# Patient Record
Sex: FEMALE | Race: WHITE | NOT HISPANIC OR LATINO | Employment: FULL TIME | URBAN - METROPOLITAN AREA
[De-identification: names, ages, dates, MRNs, and addresses within clinical notes are randomized per-mention and may not be internally consistent; named-entity substitution may affect disease eponyms.]

---

## 2017-02-01 ENCOUNTER — OFFICE VISIT (OUTPATIENT)
Dept: PSYCHIATRY | Facility: CLINIC | Age: 52
End: 2017-02-01
Payer: COMMERCIAL

## 2017-02-01 VITALS
WEIGHT: 270 LBS | SYSTOLIC BLOOD PRESSURE: 139 MMHG | HEART RATE: 66 BPM | HEIGHT: 66 IN | DIASTOLIC BLOOD PRESSURE: 70 MMHG | BODY MASS INDEX: 43.39 KG/M2

## 2017-02-01 DIAGNOSIS — F11.21 OPIOID USE DISORDER, MODERATE, IN SUSTAINED REMISSION, DEPENDENCE: Primary | ICD-10-CM

## 2017-02-01 DIAGNOSIS — F32.A DEPRESSIVE DISORDER: ICD-10-CM

## 2017-02-01 PROCEDURE — 99999 PR PBB SHADOW E&M-EST. PATIENT-LVL II: CPT | Mod: PBBFAC,,, | Performed by: PSYCHIATRY & NEUROLOGY

## 2017-02-01 PROCEDURE — 99214 OFFICE O/P EST MOD 30 MIN: CPT | Mod: S$GLB,,, | Performed by: PSYCHIATRY & NEUROLOGY

## 2017-02-01 RX ORDER — DULOXETIN HYDROCHLORIDE 60 MG/1
120 CAPSULE, DELAYED RELEASE ORAL DAILY
Qty: 180 CAPSULE | Refills: 2 | Status: SHIPPED | OUTPATIENT
Start: 2017-02-01 | End: 2017-07-26 | Stop reason: SDUPTHER

## 2017-02-01 RX ORDER — TRAZODONE HYDROCHLORIDE 50 MG/1
50 TABLET ORAL NIGHTLY PRN
Qty: 90 TABLET | Refills: 2 | Status: SHIPPED | OUTPATIENT
Start: 2017-02-01 | End: 2017-07-26 | Stop reason: SDUPTHER

## 2017-02-01 NOTE — PROGRESS NOTES
Outpatient Psychiatry Follow-Up Visit (MD/NP)    2/1/2017    Clinical Status of Patient:  Outpatient (Ambulatory)    Chief Complaint:  Vicky Alvarado is a 51 y.o. OIBW   female ( dark neck length hair)  who presents today for follow-up of depression and substance problems.  Met with patient.   PCP - Dr Cordero  Affiliated [facility]     Interval History and Content of Current Session:  Interim Events/Subjective Report/Content of Current Session: Pt is s/p ABU in June - July 2015  . Sobriety is intact . Fa attends AA with pt as he  was  a chronic drinker . He later stopped for 10 yrs then dabbled some when available . Pt has  past hx of opiates, soma and Xanax but relapsed on opiates 3 yrs ago prior to rehab . Compliant with  RNP.   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  Enjoys her job . Alcohol was never a sig concern . Has regained ICU privileges , OT, 18 tests /yr  and now  narcs  .     Very proud of her continued  weight loss       [place] [address]         Psychiatric Review Of Systems - Is patient experiencing or having changes in:  sleep:  No , trouble with global insomnia resolved with trazodone   appetite: no, conscious  lifestyle change   weight: no, weigh  270   ; down 6  #  From June 2016 --now working out in bike at home and here in [place]   -[facility]  energy/anergy: no  interest/pleasure/anhedonia: no, she has adopted 2 kittens ( 8 months)   somatic symptoms: no, Cymbalta helps chronic knee pain   libido: no  anxiety/panic: no  guilty/hopelessness: no  concentration: no  S.I.B.s/risky behavior: no  Irritability: no  Racing thoughts: no  Impulsive behaviors: no  Paranoia:no  AVH:no    Cymbalta 60 mg 2 qday  ( $ 50 in 2016 ) ; trazodone 50 mg     Psychotherapy:  · Target symptoms: depression, substance abuse  · Why chosen therapy is appropriate versus another modality: relevant to diagnosis,  patient responds to this modality, evidence based practice  · Outcome monitoring methods: self-report, observation  · Therapeutic intervention type: supportive psychotherapy  · Topics discussed/themes: work stress, substance abuse  · The patient's response to the intervention is accepting. The patient's progress toward treatment goals is excellent.   · Duration of intervention: 15  minutes.    Review of Systems   · Prob Pert. 1 sys, Ext. psych +2 add., Comp. 10-14 sys  · PSYCHIATRIC: Pertinant items are noted in the narrative.  · CONSTITUTIONAL: No weight gain or loss.   · MUSCULOSKELETAL:  tolerable knee pain and  stiffness   · NEUROLOGIC: No weakness, sensory changes, seizures, confusion, memory loss, tremor or other abnormal movements.  · ENDOCRINE: No polydipsia or polyuria.  · INTEGUMENTARY: No rashes or lacerations.  · EYES: No exophthalmos, jaundice or blindness.  · ENT: No dizziness, tinnitus or hearing loss.  · RESPIRATORY: No shortness of breath.  · CARDIOVASCULAR: No tachycardia or chest pain.  · GASTROINTESTINAL: No nausea, vomiting, pain, constipation or diarrhea.  · GENITOURINARY: No frequency, dysuria or sexual dysfunction.  · HEMATOLOGIC/LYMPHATIC: No excessive bleeding, prolonged or excessive bleeding after dental extraction/injury.  · ALLERGIC/IMMUNOLOGIC: No allergic response to materials, foods or animals at this time.    Past Medical, Family and Social History: The patient's past medical, family and social history have been reviewed and updated as appropriate within the electronic medical record - see encounter notes.    Compliance: yes    Side effects: None    Risk Parameters:  Patient reports no suicidal ideation  Patient reports no homicidal ideation  Patient reports no self-injurious behavior  Patient reports no violent behavior    Exam (detailed: at least 9 elements; comprehensive: all 15 elements)   Constitutional  Vitals:  Most recent vital signs, dated less than 90 days prior to this  "appointment, were reviewed.   Vitals:    02/01/17 1549   BP: 139/70   Pulse: 66   Weight: 122.5 kg (270 lb)   Height: 5' 6" (1.676 m)    height is 5' 6" (1.676 m) and weight is 122.5 kg (270 lb). Her blood pressure is 139/70 and her pulse is 66.          General:  unremarkable, age appropriate, casually dressed, overweight     Musculoskeletal  Muscle Strength/Tone:  no spasicity, no rigidity   Gait & Station:  non-ataxic     Psychiatric  Speech:  no latency; no press   Mood & Affect:  euthymic  congruent and appropriate   Thought Process:  normal and logical   Associations:  intact   Thought Content:  normal, no suicidality, no homicidality, delusions, or paranoia   Insight:  has awareness of illness   Judgement: behavior is adequate to circumstances   Orientation:  grossly intact   Memory: intact for content of interview   Language: grossly intact   Attention Span & Concentration:  able to focus   Fund of Knowledge:  intact and appropriate to age and level of education     Assessment and Diagnosis   Status/Progress: Based on the examination today, the patient's problem(s) is/are well controlled.  New problems have not been presented today.   Lack of compliance are not complicating management of the primary condition.  There are no active rule-out diagnoses for this patient at this time.     General Impression: doing well       ICD-10-CM ICD-9-CM   1. Opioid use disorder, moderate, in sustained remission, dependence F11.21 304.03   2. Depressive disorder F32.9 311       Intervention/Counseling/Treatment Plan   · Continue current meds   · Continue ind and group therapy        Care coordinated with Recovering nurse program report -   I now support q 6 month med checks          Continue Exercising  And wt loss       Return to Clinic: 6 months  "

## 2017-03-16 ENCOUNTER — PATIENT MESSAGE (OUTPATIENT)
Dept: PSYCHIATRY | Facility: CLINIC | Age: 52
End: 2017-03-16

## 2017-03-20 RX ORDER — NALTREXONE HYDROCHLORIDE 50 MG/1
TABLET, FILM COATED ORAL
Qty: 30 TABLET | Refills: 0 | Status: SHIPPED | OUTPATIENT
Start: 2017-03-20 | End: 2018-01-18 | Stop reason: ALTCHOICE

## 2017-07-26 ENCOUNTER — OFFICE VISIT (OUTPATIENT)
Dept: PSYCHIATRY | Facility: CLINIC | Age: 52
End: 2017-07-26
Payer: COMMERCIAL

## 2017-07-26 VITALS
HEART RATE: 66 BPM | DIASTOLIC BLOOD PRESSURE: 67 MMHG | SYSTOLIC BLOOD PRESSURE: 112 MMHG | WEIGHT: 270 LBS | HEIGHT: 66 IN | BODY MASS INDEX: 43.39 KG/M2

## 2017-07-26 DIAGNOSIS — F11.21 OPIOID USE DISORDER, MODERATE, IN SUSTAINED REMISSION, DEPENDENCE: Primary | ICD-10-CM

## 2017-07-26 DIAGNOSIS — F32.A DEPRESSIVE DISORDER: ICD-10-CM

## 2017-07-26 PROCEDURE — 99213 OFFICE O/P EST LOW 20 MIN: CPT | Mod: S$GLB,,, | Performed by: PSYCHIATRY & NEUROLOGY

## 2017-07-26 PROCEDURE — 99999 PR PBB SHADOW E&M-EST. PATIENT-LVL II: CPT | Mod: PBBFAC,,, | Performed by: PSYCHIATRY & NEUROLOGY

## 2017-07-26 RX ORDER — TRAZODONE HYDROCHLORIDE 100 MG/1
100 TABLET ORAL NIGHTLY PRN
Qty: 90 TABLET | Refills: 1 | Status: SHIPPED | OUTPATIENT
Start: 2017-07-26 | End: 2018-01-24 | Stop reason: SDUPTHER

## 2017-07-26 RX ORDER — DULOXETIN HYDROCHLORIDE 60 MG/1
120 CAPSULE, DELAYED RELEASE ORAL DAILY
Qty: 180 CAPSULE | Refills: 2 | Status: SHIPPED | OUTPATIENT
Start: 2017-07-26 | End: 2018-01-24 | Stop reason: SDUPTHER
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
## 2017-07-27 ENCOUNTER — CLINICAL SUPPORT (OUTPATIENT)
Dept: OCCUPATIONAL MEDICINE | Facility: CLINIC | Age: 52
End: 2017-07-27

## 2017-07-27 DIAGNOSIS — Z02.83 EMPLOYMENT-RELATED DRUG TESTING, ENCOUNTER FOR: Primary | ICD-10-CM

## 2017-08-09 ENCOUNTER — CLINICAL SUPPORT (OUTPATIENT)
Dept: OCCUPATIONAL MEDICINE | Facility: CLINIC | Age: 52
End: 2017-08-09
Payer: COMMERCIAL

## 2017-08-09 DIAGNOSIS — Z02.83 EMPLOYMENT-RELATED DRUG TESTING, ENCOUNTER FOR: Primary | ICD-10-CM

## 2017-09-25 ENCOUNTER — CLINICAL SUPPORT (OUTPATIENT)
Dept: OCCUPATIONAL MEDICINE | Facility: CLINIC | Age: 52
End: 2017-09-25

## 2017-09-25 DIAGNOSIS — Z02.83 EMPLOYMENT-RELATED DRUG TESTING, ENCOUNTER FOR: Primary | ICD-10-CM

## 2017-09-27 DIAGNOSIS — M25.531 BILATERAL WRIST PAIN: Primary | ICD-10-CM

## 2017-09-27 DIAGNOSIS — M25.532 BILATERAL WRIST PAIN: Primary | ICD-10-CM

## 2017-09-28 ENCOUNTER — HOSPITAL ENCOUNTER (OUTPATIENT)
Dept: RADIOLOGY | Facility: OTHER | Age: 52
Discharge: HOME | End: 2017-09-28
Attending: ORTHOPAEDIC SURGERY
Payer: COMMERCIAL

## 2017-09-28 ENCOUNTER — OFFICE VISIT (OUTPATIENT)
Dept: ORTHOPEDICS | Facility: CLINIC | Age: 52
End: 2017-09-28
Payer: COMMERCIAL

## 2017-09-28 VITALS
DIASTOLIC BLOOD PRESSURE: 75 MMHG | WEIGHT: 270 LBS | RESPIRATION RATE: 18 BRPM | HEART RATE: 69 BPM | HEIGHT: 66 IN | SYSTOLIC BLOOD PRESSURE: 124 MMHG | BODY MASS INDEX: 43.39 KG/M2

## 2017-09-28 DIAGNOSIS — G56.01 CARPAL TUNNEL SYNDROME OF RIGHT WRIST: Primary | ICD-10-CM

## 2017-09-28 DIAGNOSIS — M25.531 BILATERAL WRIST PAIN: ICD-10-CM

## 2017-09-28 DIAGNOSIS — M25.532 BILATERAL WRIST PAIN: ICD-10-CM

## 2017-09-28 PROCEDURE — 76942 ECHO GUIDE FOR BIOPSY: CPT | Mod: S$GLB,,, | Performed by: ORTHOPAEDIC SURGERY

## 2017-09-28 PROCEDURE — 73110 X-RAY EXAM OF WRIST: CPT | Mod: 50,TC

## 2017-09-28 PROCEDURE — 20526 THER INJECTION CARP TUNNEL: CPT | Mod: RT,S$GLB,, | Performed by: ORTHOPAEDIC SURGERY

## 2017-09-28 PROCEDURE — 99213 OFFICE O/P EST LOW 20 MIN: CPT | Mod: 25,S$GLB,, | Performed by: ORTHOPAEDIC SURGERY

## 2017-09-28 PROCEDURE — 99999 PR PBB SHADOW E&M-EST. PATIENT-LVL III: CPT | Mod: PBBFAC,,, | Performed by: ORTHOPAEDIC SURGERY

## 2017-09-28 PROCEDURE — 73110 X-RAY EXAM OF WRIST: CPT | Mod: 26,50,, | Performed by: RADIOLOGY

## 2017-09-28 RX ADMIN — DEXAMETHASONE SODIUM PHOSPHATE 4 MG: 4 INJECTION, SOLUTION INTRA-ARTICULAR; INTRALESIONAL; INTRAMUSCULAR; INTRAVENOUS; SOFT TISSUE at 12:09

## 2017-09-28 NOTE — PROCEDURES
Carpal Tunnel  Date/Time: 9/28/2017 12:01 PM  Performed by: AJIT OSULLIVAN  Authorized by: AJIT OSULLIVAN     Consent Done?:  Yes (Verbal)  Indications:  Pain  Timeout: Prior to procedure the correct patient, procedure, and site was verified      Location:  Wrist  Site:  R radiocarpal  Ultrasonic Guidance for needle placement: Yes  Images are saved and documented.  Needle size:  25 G  Approach:  Volar  Medications:  4 mg dexamethasone 4 mg/mL

## 2017-09-28 NOTE — PROGRESS NOTES
I have personally taken the history and examined this patient. I agree with the resident's note as stated above. Plan for EMG/ NCS. Will proceed with injection today. Pt does get intermittant N/T but also has pain. Pt also has kienbocks on left wrist.  See procedure note

## 2017-10-06 NOTE — PROGRESS NOTES
H&P  Orthopaedics    SUBJECTIVE:     History of Present Illness:  Patient is a 52 y.o. female RHD with right thumb/index/long finger numbness.  It has been present for ~1 year.  No injury/trauma.  Does endorse some pain, that does occur at night.  Not using night splints.  Has h/o left Kienbock's, but this is not painful.    Scheduled Meds:  Continuous Infusions:  PRN Meds:    Review of patient's allergies indicates:  No Known Allergies    Past Medical History:   Diagnosis Date    Depression     Hx of psychiatric care     Psychiatric problem      No past surgical history on file.  Family History   Problem Relation Age of Onset    Cataracts Mother     Hypertension Mother     Thyroid disease Mother     Diabetes Father     Hypertension Father     Hypertension Sister     Hypertension Brother     Amblyopia Neg Hx     Blindness Neg Hx     Cancer Neg Hx     Glaucoma Neg Hx     Macular degeneration Neg Hx     Retinal detachment Neg Hx     Strabismus Neg Hx     Stroke Neg Hx      Social History   Substance Use Topics    Smoking status: Former Smoker    Smokeless tobacco: Not on file    Alcohol use No        Review of Systems:  Patient denies constitutional symptoms, cardiac symptoms, respiratory symptoms, GI symptoms.  The remainder of the musculoskeletal ROS is included in the HPI.      OBJECTIVE:     Vital Signs (Most Recent)  Pulse: 69 (09/28/17 1131)  Resp: 18 (09/28/17 1131)  BP: 124/75 (09/28/17 1131)    Physical Exam:  Gen:  No acute distress  CV:  Peripherally well-perfused.  Pulses 2+ bilaterally.  Lungs:  Normal respiratory effort.  Abdomen:  Soft, non-tender, non-distended  Head/Neck:  Normocephalic.  Atraumatic. No TTP, AROM and PROM intact without pain  Neuro:  CN intact without deficit, SILT throughout B/L Upper & Lower Extremities      MSK:  RUE: no deformity or ttp; + Durkan and Tinnel's; SILT throughout; NVI; full ROM    LUE: minimal ttp to dorsal lunate; negative Durkan/Tinnels' NVI;  full ROM    Laboratory:  No results found for this or any previous visit (from the past 72 hour(s)).    Diagnostic Results:      ASSESSMENT/PLAN:     A/P: Vicky Alvarado is a 52 y.o. 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  Will give injection today and send for EMG. RTC to discuss EMG results      Dre Guzmán M.D. PGY3  Orthopedic Surgery Resident

## 2017-10-16 RX ORDER — DEXAMETHASONE SODIUM PHOSPHATE 4 MG/ML
4 INJECTION, SOLUTION INTRA-ARTICULAR; INTRALESIONAL; INTRAMUSCULAR; INTRAVENOUS; SOFT TISSUE
Status: DISCONTINUED | OUTPATIENT
Start: 2017-09-28 | End: 2017-10-16 | Stop reason: HOSPADM

## 2017-10-19 ENCOUNTER — CLINICAL SUPPORT (OUTPATIENT)
Dept: OCCUPATIONAL MEDICINE | Facility: CLINIC | Age: 52
End: 2017-10-19

## 2017-10-19 DIAGNOSIS — Z02.83 ENCOUNTER FOR DRUG SCREENING: Primary | ICD-10-CM

## 2017-10-19 PROCEDURE — 80305 DRUG TEST PRSMV DIR OPT OBS: CPT | Mod: S$GLB,,, | Performed by: INTERNAL MEDICINE

## 2017-10-23 ENCOUNTER — PROCEDURE VISIT (OUTPATIENT)
Dept: NEUROLOGY | Facility: CLINIC | Age: 52
End: 2017-10-23
Payer: COMMERCIAL

## 2017-10-23 DIAGNOSIS — G56.01 CARPAL TUNNEL SYNDROME OF RIGHT WRIST: ICD-10-CM

## 2017-10-23 PROCEDURE — 95910 NRV CNDJ TEST 7-8 STUDIES: CPT | Mod: S$GLB,,, | Performed by: PSYCHIATRY & NEUROLOGY

## 2017-10-23 PROCEDURE — 95886 MUSC TEST DONE W/N TEST COMP: CPT | Mod: RT,S$GLB,, | Performed by: PSYCHIATRY & NEUROLOGY

## 2017-11-28 ENCOUNTER — CLINICAL SUPPORT (OUTPATIENT)
Dept: OCCUPATIONAL MEDICINE | Facility: CLINIC | Age: 52
End: 2017-11-28

## 2017-11-28 DIAGNOSIS — Z02.83 ENCOUNTER FOR DRUG SCREENING: Primary | ICD-10-CM

## 2017-11-28 PROCEDURE — 80305 DRUG TEST PRSMV DIR OPT OBS: CPT | Mod: S$GLB,,, | Performed by: INTERNAL MEDICINE

## 2017-12-21 ENCOUNTER — CLINICAL SUPPORT (OUTPATIENT)
Dept: OCCUPATIONAL MEDICINE | Facility: CLINIC | Age: 52
End: 2017-12-21

## 2017-12-21 DIAGNOSIS — Z02.83 ENCOUNTER FOR DRUG SCREENING: Primary | ICD-10-CM

## 2017-12-21 PROCEDURE — 80305 DRUG TEST PRSMV DIR OPT OBS: CPT | Mod: S$GLB,,, | Performed by: FAMILY MEDICINE

## 2018-01-05 ENCOUNTER — PATIENT MESSAGE (OUTPATIENT)
Dept: PSYCHIATRY | Facility: CLINIC | Age: 53
End: 2018-01-05

## 2018-01-12 ENCOUNTER — CLINICAL SUPPORT (OUTPATIENT)
Dept: OCCUPATIONAL MEDICINE | Facility: CLINIC | Age: 53
End: 2018-01-12

## 2018-01-12 DIAGNOSIS — Z02.83 ENCOUNTER FOR DRUG SCREENING: Primary | ICD-10-CM

## 2018-01-18 ENCOUNTER — OFFICE VISIT (OUTPATIENT)
Dept: PSYCHIATRY | Facility: CLINIC | Age: 53
End: 2018-01-18
Payer: COMMERCIAL

## 2018-01-18 VITALS
WEIGHT: 285 LBS | HEIGHT: 66 IN | BODY MASS INDEX: 45.8 KG/M2 | DIASTOLIC BLOOD PRESSURE: 73 MMHG | HEART RATE: 80 BPM | SYSTOLIC BLOOD PRESSURE: 142 MMHG

## 2018-01-18 DIAGNOSIS — E66.01 MORBID OBESITY WITH BMI OF 45.0-49.9, ADULT: ICD-10-CM

## 2018-01-18 DIAGNOSIS — F11.21 OPIOID USE DISORDER, MODERATE, IN SUSTAINED REMISSION, DEPENDENCE: Primary | ICD-10-CM

## 2018-01-18 DIAGNOSIS — F32.A DEPRESSIVE DISORDER: ICD-10-CM

## 2018-01-18 PROCEDURE — 99999 PR PBB SHADOW E&M-EST. PATIENT-LVL II: CPT | Mod: PBBFAC,,, | Performed by: PSYCHIATRY & NEUROLOGY

## 2018-01-18 PROCEDURE — 99214 OFFICE O/P EST MOD 30 MIN: CPT | Mod: S$GLB,,, | Performed by: PSYCHIATRY & NEUROLOGY

## 2018-01-18 PROCEDURE — 90833 PSYTX W PT W E/M 30 MIN: CPT | Mod: S$GLB,,, | Performed by: PSYCHIATRY & NEUROLOGY
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
## 2018-01-22 ENCOUNTER — LAB VISIT (OUTPATIENT)
Dept: LAB | Facility: HOSPITAL | Age: 53
End: 2018-01-22
Attending: PSYCHIATRY & NEUROLOGY
Payer: COMMERCIAL

## 2018-01-22 ENCOUNTER — PATIENT MESSAGE (OUTPATIENT)
Dept: PSYCHIATRY | Facility: HOSPITAL | Age: 53
End: 2018-01-22

## 2018-01-22 DIAGNOSIS — E66.01 MORBID OBESITY WITH BMI OF 45.0-49.9, ADULT: ICD-10-CM

## 2018-01-22 LAB
ALBUMIN SERPL BCP-MCNC: 3.5 G/DL
ALP SERPL-CCNC: 97 U/L
ALT SERPL W/O P-5'-P-CCNC: 24 U/L
ANION GAP SERPL CALC-SCNC: 12 MMOL/L
AST SERPL-CCNC: 39 U/L
BASOPHILS # BLD AUTO: 0.09 K/UL
BASOPHILS NFR BLD: 1.1 %
BILIRUB SERPL-MCNC: 0.5 MG/DL
BUN SERPL-MCNC: 11 MG/DL
CALCIUM SERPL-MCNC: 9.3 MG/DL
CHLORIDE SERPL-SCNC: 104 MMOL/L
CHOLEST SERPL-MCNC: 200 MG/DL
CHOLEST/HDLC SERPL: 5 {RATIO}
CO2 SERPL-SCNC: 26 MMOL/L
CREAT SERPL-MCNC: 0.8 MG/DL
DIFFERENTIAL METHOD: ABNORMAL
EOSINOPHIL # BLD AUTO: 0.4 K/UL
EOSINOPHIL NFR BLD: 5.5 %
ERYTHROCYTE [DISTWIDTH] IN BLOOD BY AUTOMATED COUNT: 15.1 %
EST. GFR  (AFRICAN AMERICAN): >60 ML/MIN/1.73 M^2
EST. GFR  (NON AFRICAN AMERICAN): >60 ML/MIN/1.73 M^2
GLUCOSE SERPL-MCNC: 125 MG/DL
HCT VFR BLD AUTO: 40.3 %
HDLC SERPL-MCNC: 40 MG/DL
HDLC SERPL: 20 %
HGB BLD-MCNC: 13.2 G/DL
IMM GRANULOCYTES # BLD AUTO: 0.02 K/UL
IMM GRANULOCYTES NFR BLD AUTO: 0.3 %
LDLC SERPL CALC-MCNC: 141 MG/DL
LYMPHOCYTES # BLD AUTO: 2.1 K/UL
LYMPHOCYTES NFR BLD: 25.8 %
MCH RBC QN AUTO: 28.6 PG
MCHC RBC AUTO-ENTMCNC: 32.8 G/DL
MCV RBC AUTO: 87 FL
MONOCYTES # BLD AUTO: 0.8 K/UL
MONOCYTES NFR BLD: 10 %
NEUTROPHILS # BLD AUTO: 4.6 K/UL
NEUTROPHILS NFR BLD: 57.3 %
NONHDLC SERPL-MCNC: 160 MG/DL
NRBC BLD-RTO: 0 /100 WBC
PLATELET # BLD AUTO: 206 K/UL
PMV BLD AUTO: 11.9 FL
POTASSIUM SERPL-SCNC: 3.9 MMOL/L
PROT SERPL-MCNC: 7.2 G/DL
RBC # BLD AUTO: 4.62 M/UL
SODIUM SERPL-SCNC: 142 MMOL/L
TRIGL SERPL-MCNC: 95 MG/DL
TSH SERPL DL<=0.005 MIU/L-ACNC: 1.05 UIU/ML
WBC # BLD AUTO: 7.98 K/UL

## 2018-01-22 PROCEDURE — 36415 COLL VENOUS BLD VENIPUNCTURE: CPT

## 2018-01-22 PROCEDURE — 84443 ASSAY THYROID STIM HORMONE: CPT

## 2018-01-22 PROCEDURE — 80053 COMPREHEN METABOLIC PANEL: CPT

## 2018-01-22 PROCEDURE — 80061 LIPID PANEL: CPT

## 2018-01-22 PROCEDURE — 85025 COMPLETE CBC W/AUTO DIFF WBC: CPT

## 2018-01-23 ENCOUNTER — CLINICAL SUPPORT (OUTPATIENT)
Dept: OCCUPATIONAL MEDICINE | Facility: CLINIC | Age: 53
End: 2018-01-23

## 2018-01-23 DIAGNOSIS — Z02.83 ENCOUNTER FOR DRUG SCREENING: Primary | ICD-10-CM

## 2018-01-24 RX ORDER — TRAZODONE HYDROCHLORIDE 100 MG/1
100 TABLET ORAL NIGHTLY PRN
Qty: 90 TABLET | Refills: 1 | Status: SHIPPED | OUTPATIENT
Start: 2018-01-24 | End: 2018-06-11 | Stop reason: SDUPTHER

## 2018-01-24 RX ORDER — DULOXETIN HYDROCHLORIDE 60 MG/1
120 CAPSULE, DELAYED RELEASE ORAL DAILY
Qty: 180 CAPSULE | Refills: 2 | Status: SHIPPED | OUTPATIENT
Start: 2018-01-24 | End: 2018-06-11 | Stop reason: SDUPTHER

## 2018-02-14 ENCOUNTER — CLINICAL SUPPORT (OUTPATIENT)
Dept: OCCUPATIONAL MEDICINE | Facility: CLINIC | Age: 53
End: 2018-02-14

## 2018-02-14 DIAGNOSIS — Z02.83 ENCOUNTER FOR DRUG SCREENING: Primary | ICD-10-CM

## 2018-02-14 PROCEDURE — 80305 DRUG TEST PRSMV DIR OPT OBS: CPT | Mod: S$GLB,,, | Performed by: NURSE PRACTITIONER

## 2018-03-05 ENCOUNTER — CLINICAL SUPPORT (OUTPATIENT)
Dept: OCCUPATIONAL MEDICINE | Facility: CLINIC | Age: 53
End: 2018-03-05

## 2018-03-05 DIAGNOSIS — Z02.83 ENCOUNTER FOR DRUG SCREENING: Primary | ICD-10-CM

## 2018-03-05 PROCEDURE — 80305 DRUG TEST PRSMV DIR OPT OBS: CPT | Mod: S$GLB,,, | Performed by: FAMILY MEDICINE

## 2018-03-20 ENCOUNTER — CLINICAL SUPPORT (OUTPATIENT)
Dept: OCCUPATIONAL MEDICINE | Facility: CLINIC | Age: 53
End: 2018-03-20

## 2018-03-20 DIAGNOSIS — Z02.83 EMPLOYMENT-RELATED DRUG TESTING, ENCOUNTER FOR: Primary | ICD-10-CM

## 2018-03-20 PROCEDURE — 80305 DRUG TEST PRSMV DIR OPT OBS: CPT | Mod: S$GLB,,, | Performed by: FAMILY MEDICINE

## 2018-03-21 ENCOUNTER — PATIENT MESSAGE (OUTPATIENT)
Dept: PSYCHIATRY | Facility: CLINIC | Age: 53
End: 2018-03-21

## 2018-04-10 ENCOUNTER — CLINICAL SUPPORT (OUTPATIENT)
Dept: OCCUPATIONAL MEDICINE | Facility: CLINIC | Age: 53
End: 2018-04-10

## 2018-04-10 DIAGNOSIS — Z02.83 ENCOUNTER FOR DRUG SCREENING: Primary | ICD-10-CM

## 2018-04-10 PROCEDURE — 80305 DRUG TEST PRSMV DIR OPT OBS: CPT | Mod: S$GLB,,, | Performed by: FAMILY MEDICINE

## 2018-04-25 ENCOUNTER — PATIENT MESSAGE (OUTPATIENT)
Dept: PSYCHIATRY | Facility: CLINIC | Age: 53
End: 2018-04-25

## 2018-04-27 ENCOUNTER — CLINICAL SUPPORT (OUTPATIENT)
Dept: OCCUPATIONAL MEDICINE | Facility: CLINIC | Age: 53
End: 2018-04-27

## 2018-04-27 DIAGNOSIS — Z02.83 ENCOUNTER FOR DRUG SCREENING: Primary | ICD-10-CM

## 2018-04-27 PROCEDURE — 80305 DRUG TEST PRSMV DIR OPT OBS: CPT | Mod: S$GLB,,, | Performed by: FAMILY MEDICINE

## 2018-05-31 ENCOUNTER — CLINICAL SUPPORT (OUTPATIENT)
Dept: OCCUPATIONAL MEDICINE | Facility: CLINIC | Age: 53
End: 2018-05-31

## 2018-05-31 DIAGNOSIS — Z02.83 ENCOUNTER FOR DRUG SCREENING: Primary | ICD-10-CM

## 2018-05-31 PROCEDURE — 80305 DRUG TEST PRSMV DIR OPT OBS: CPT | Mod: S$GLB,,, | Performed by: NURSE PRACTITIONER

## 2018-06-07 ENCOUNTER — CLINICAL SUPPORT (OUTPATIENT)
Dept: OCCUPATIONAL MEDICINE | Facility: CLINIC | Age: 53
End: 2018-06-07

## 2018-06-07 DIAGNOSIS — Z02.83 ENCOUNTER FOR DRUG SCREENING: Primary | ICD-10-CM

## 2018-06-07 PROCEDURE — 80305 DRUG TEST PRSMV DIR OPT OBS: CPT | Mod: S$GLB,,, | Performed by: INTERNAL MEDICINE

## 2018-06-11 ENCOUNTER — OFFICE VISIT (OUTPATIENT)
Dept: PSYCHIATRY | Facility: CLINIC | Age: 53
End: 2018-06-11
Payer: COMMERCIAL

## 2018-06-11 VITALS
SYSTOLIC BLOOD PRESSURE: 176 MMHG | HEIGHT: 66 IN | DIASTOLIC BLOOD PRESSURE: 75 MMHG | BODY MASS INDEX: 45.53 KG/M2 | WEIGHT: 283.31 LBS | HEART RATE: 65 BPM

## 2018-06-11 DIAGNOSIS — F32.A DEPRESSIVE DISORDER: Primary | ICD-10-CM

## 2018-06-11 DIAGNOSIS — E66.01 MORBID OBESITY WITH BMI OF 45.0-49.9, ADULT: ICD-10-CM

## 2018-06-11 DIAGNOSIS — F11.21 OPIOID USE DISORDER, MODERATE, IN SUSTAINED REMISSION, DEPENDENCE: ICD-10-CM

## 2018-06-11 PROCEDURE — 99213 OFFICE O/P EST LOW 20 MIN: CPT | Mod: S$GLB,,, | Performed by: PSYCHIATRY & NEUROLOGY

## 2018-06-11 PROCEDURE — 3077F SYST BP >= 140 MM HG: CPT | Mod: CPTII,S$GLB,, | Performed by: PSYCHIATRY & NEUROLOGY

## 2018-06-11 PROCEDURE — 3078F DIAST BP <80 MM HG: CPT | Mod: CPTII,S$GLB,, | Performed by: PSYCHIATRY & NEUROLOGY

## 2018-06-11 PROCEDURE — 99999 PR PBB SHADOW E&M-EST. PATIENT-LVL II: CPT | Mod: PBBFAC,,, | Performed by: PSYCHIATRY & NEUROLOGY

## 2018-06-11 PROCEDURE — 3008F BODY MASS INDEX DOCD: CPT | Mod: CPTII,S$GLB,, | Performed by: PSYCHIATRY & NEUROLOGY

## 2018-06-11 RX ORDER — DULOXETIN HYDROCHLORIDE 60 MG/1
120 CAPSULE, DELAYED RELEASE ORAL DAILY
Qty: 180 CAPSULE | Refills: 3 | Status: SHIPPED | OUTPATIENT
Start: 2018-06-11 | End: 2019-06-24 | Stop reason: SDUPTHER

## 2018-06-11 RX ORDER — TRAZODONE HYDROCHLORIDE 100 MG/1
100 TABLET ORAL NIGHTLY PRN
Qty: 90 TABLET | Refills: 3 | Status: SHIPPED | OUTPATIENT
Start: 2018-06-11 | End: 2019-06-24 | Stop reason: SDUPTHER
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
## 2018-06-20 ENCOUNTER — CLINICAL SUPPORT (OUTPATIENT)
Dept: OCCUPATIONAL MEDICINE | Facility: CLINIC | Age: 53
End: 2018-06-20

## 2018-06-20 DIAGNOSIS — Z02.83 ENCOUNTER FOR DRUG SCREENING: Primary | ICD-10-CM

## 2018-06-20 PROCEDURE — 80305 DRUG TEST PRSMV DIR OPT OBS: CPT | Mod: S$GLB,,, | Performed by: INTERNAL MEDICINE

## 2018-07-10 ENCOUNTER — CLINICAL SUPPORT (OUTPATIENT)
Dept: OCCUPATIONAL MEDICINE | Facility: CLINIC | Age: 53
End: 2018-07-10

## 2018-07-10 DIAGNOSIS — Z02.83 ENCOUNTER FOR DRUG SCREENING: Primary | ICD-10-CM

## 2018-07-10 PROCEDURE — 80305 DRUG TEST PRSMV DIR OPT OBS: CPT | Mod: S$GLB,,, | Performed by: FAMILY MEDICINE

## 2018-08-01 ENCOUNTER — CLINICAL SUPPORT (OUTPATIENT)
Dept: OCCUPATIONAL MEDICINE | Facility: CLINIC | Age: 53
End: 2018-08-01

## 2018-08-01 DIAGNOSIS — Z02.83 ENCOUNTER FOR DRUG SCREENING: Primary | ICD-10-CM

## 2018-08-01 PROCEDURE — 80305 DRUG TEST PRSMV DIR OPT OBS: CPT | Mod: S$GLB,,, | Performed by: INTERNAL MEDICINE

## 2018-08-17 ENCOUNTER — CLINICAL SUPPORT (OUTPATIENT)
Dept: URGENT CARE | Facility: CLINIC | Age: 53
End: 2018-08-17

## 2018-08-17 DIAGNOSIS — Z02.83 ENCOUNTER FOR DRUG SCREENING: Primary | ICD-10-CM

## 2018-08-17 PROCEDURE — 80305 DRUG TEST PRSMV DIR OPT OBS: CPT | Mod: S$GLB,,, | Performed by: FAMILY MEDICINE

## 2018-09-13 ENCOUNTER — CLINICAL SUPPORT (OUTPATIENT)
Dept: URGENT CARE | Facility: CLINIC | Age: 53
End: 2018-09-13

## 2018-09-13 DIAGNOSIS — Z02.83 ENCOUNTER FOR DRUG SCREENING: Primary | ICD-10-CM

## 2018-09-13 PROCEDURE — 80305 DRUG TEST PRSMV DIR OPT OBS: CPT | Mod: S$GLB,,, | Performed by: INTERNAL MEDICINE

## 2018-09-24 ENCOUNTER — CLINICAL SUPPORT (OUTPATIENT)
Dept: URGENT CARE | Facility: CLINIC | Age: 53
End: 2018-09-24

## 2018-09-24 DIAGNOSIS — Z02.83 ENCOUNTER FOR DRUG SCREENING: Primary | ICD-10-CM

## 2018-09-24 PROCEDURE — 80305 DRUG TEST PRSMV DIR OPT OBS: CPT | Mod: S$GLB,,, | Performed by: NURSE PRACTITIONER

## 2018-10-02 ENCOUNTER — CLINICAL SUPPORT (OUTPATIENT)
Dept: URGENT CARE | Facility: CLINIC | Age: 53
End: 2018-10-02

## 2018-10-02 DIAGNOSIS — Z02.83 ENCOUNTER FOR DRUG SCREENING: Primary | ICD-10-CM

## 2018-10-02 PROCEDURE — 80305 DRUG TEST PRSMV DIR OPT OBS: CPT | Mod: S$GLB,,, | Performed by: EMERGENCY MEDICINE

## 2018-10-15 ENCOUNTER — CLINICAL SUPPORT (OUTPATIENT)
Dept: URGENT CARE | Facility: CLINIC | Age: 53
End: 2018-10-15

## 2018-10-15 DIAGNOSIS — Z02.83 ENCOUNTER FOR DRUG SCREENING: Primary | ICD-10-CM

## 2018-10-15 PROCEDURE — 80305 DRUG TEST PRSMV DIR OPT OBS: CPT | Mod: S$GLB,,, | Performed by: FAMILY MEDICINE

## 2018-10-22 ENCOUNTER — OFFICE VISIT (OUTPATIENT)
Dept: OPTOMETRY | Facility: CLINIC | Age: 53
End: 2018-10-22
Payer: COMMERCIAL

## 2018-10-22 DIAGNOSIS — H52.4 MYOPIA OF BOTH EYES WITH ASTIGMATISM AND PRESBYOPIA: Primary | ICD-10-CM

## 2018-10-22 DIAGNOSIS — H52.13 MYOPIA OF BOTH EYES WITH ASTIGMATISM AND PRESBYOPIA: Primary | ICD-10-CM

## 2018-10-22 DIAGNOSIS — H52.203 MYOPIA OF BOTH EYES WITH ASTIGMATISM AND PRESBYOPIA: Primary | ICD-10-CM

## 2018-10-22 PROCEDURE — 92004 COMPRE OPH EXAM NEW PT 1/>: CPT | Mod: S$GLB,,, | Performed by: OPTOMETRIST

## 2018-10-22 PROCEDURE — 99999 PR PBB SHADOW E&M-EST. PATIENT-LVL III: CPT | Mod: PBBFAC,,, | Performed by: OPTOMETRIST

## 2018-10-22 PROCEDURE — 92015 DETERMINE REFRACTIVE STATE: CPT | Mod: S$GLB,,, | Performed by: OPTOMETRIST

## 2018-10-22 NOTE — PROGRESS NOTES
HPI     [facility]  Patient returns for overdue check.   Pt complains of blur @ near with gls that are more than 4 yrs.   Intermittent tearing OU and burning.     Last edited by Jonathan Montalvo, OD on 10/22/2018  2:40 PM. (History)            Assessment /Plan     For exam results, see Encounter Report.    Myopia of both eyes with astigmatism and presbyopia  Eyeglass Final Rx     Eyeglass Final Rx       Sphere Cylinder Axis Dist VA Add    Right -8.25 +3.75 180 20/20 +2.25    Left -8.75 +3.50 010 20/20 +2.25    Type:  PAL    Expiration Date:  10/23/2019                  RTC 1 yr

## 2018-11-08 ENCOUNTER — CLINICAL SUPPORT (OUTPATIENT)
Dept: URGENT CARE | Facility: CLINIC | Age: 53
End: 2018-11-08

## 2018-11-08 DIAGNOSIS — Z02.83 ENCOUNTER FOR DRUG SCREENING: Primary | ICD-10-CM

## 2018-11-08 PROCEDURE — 80305 DRUG TEST PRSMV DIR OPT OBS: CPT | Mod: S$GLB,,, | Performed by: INTERNAL MEDICINE

## 2018-11-28 ENCOUNTER — CLINICAL SUPPORT (OUTPATIENT)
Dept: URGENT CARE | Facility: CLINIC | Age: 53
End: 2018-11-28

## 2018-11-28 DIAGNOSIS — Z02.83 EMPLOYMENT-RELATED DRUG TESTING, ENCOUNTER FOR: Primary | ICD-10-CM

## 2018-11-28 PROCEDURE — 80305 DRUG TEST PRSMV DIR OPT OBS: CPT | Mod: S$GLB,,, | Performed by: INTERNAL MEDICINE

## 2018-12-11 RX ORDER — HYDROCHLOROTHIAZIDE 50 MG/1
TABLET ORAL
Qty: 90 TABLET | Refills: 2 | Status: CANCELLED | OUTPATIENT
Start: 2018-12-11

## 2018-12-11 RX ORDER — ATENOLOL 50 MG/1
TABLET ORAL
Qty: 180 TABLET | Refills: 2 | Status: CANCELLED | OUTPATIENT
Start: 2018-12-11

## 2018-12-11 RX ORDER — IBUPROFEN 800 MG/1
TABLET ORAL
Qty: 180 TABLET | Refills: 2 | Status: CANCELLED | OUTPATIENT
Start: 2018-12-11

## 2018-12-18 ENCOUNTER — CLINICAL SUPPORT (OUTPATIENT)
Dept: URGENT CARE | Facility: CLINIC | Age: 53
End: 2018-12-18

## 2018-12-18 DIAGNOSIS — Z02.83 ENCOUNTER FOR DRUG SCREENING: Primary | ICD-10-CM

## 2018-12-18 PROCEDURE — 80305 DRUG TEST PRSMV DIR OPT OBS: CPT | Mod: S$GLB,,, | Performed by: FAMILY MEDICINE

## 2018-12-27 ENCOUNTER — CLINICAL SUPPORT (OUTPATIENT)
Dept: URGENT CARE | Facility: CLINIC | Age: 53
End: 2018-12-27

## 2018-12-27 DIAGNOSIS — Z02.83 EMPLOYMENT-RELATED DRUG TESTING, ENCOUNTER FOR: Primary | ICD-10-CM

## 2018-12-27 PROCEDURE — 80305 DRUG TEST PRSMV DIR OPT OBS: CPT | Mod: S$GLB,,, | Performed by: INTERNAL MEDICINE

## 2019-01-10 ENCOUNTER — CLINICAL SUPPORT (OUTPATIENT)
Dept: URGENT CARE | Facility: CLINIC | Age: 54
End: 2019-01-10

## 2019-01-10 DIAGNOSIS — Z02.83 EMPLOYMENT-RELATED DRUG TESTING, ENCOUNTER FOR: Primary | ICD-10-CM

## 2019-01-10 PROCEDURE — 80305 PR COLLECTION ONLY DRUG SCREEN: ICD-10-PCS | Mod: S$GLB,,, | Performed by: INTERNAL MEDICINE

## 2019-01-10 PROCEDURE — 80305 DRUG TEST PRSMV DIR OPT OBS: CPT | Mod: S$GLB,,, | Performed by: INTERNAL MEDICINE

## 2019-01-23 ENCOUNTER — OCCUPATIONAL HEALTH (OUTPATIENT)
Dept: URGENT CARE | Facility: CLINIC | Age: 54
End: 2019-01-23

## 2019-01-23 DIAGNOSIS — Z02.83 EMPLOYMENT-RELATED DRUG TESTING, ENCOUNTER FOR: Primary | ICD-10-CM

## 2019-01-23 PROCEDURE — 80305 PR COLLECTION ONLY DRUG SCREEN: ICD-10-PCS | Mod: S$GLB,,, | Performed by: INTERNAL MEDICINE

## 2019-01-23 PROCEDURE — 80305 DRUG TEST PRSMV DIR OPT OBS: CPT | Mod: S$GLB,,, | Performed by: INTERNAL MEDICINE

## 2019-02-01 ENCOUNTER — OCCUPATIONAL HEALTH (OUTPATIENT)
Dept: URGENT CARE | Facility: CLINIC | Age: 54
End: 2019-02-01

## 2019-02-01 DIAGNOSIS — Z02.83 EMPLOYMENT-RELATED DRUG TESTING, ENCOUNTER FOR: Primary | ICD-10-CM

## 2019-02-01 PROCEDURE — 80305 PR COLLECTION ONLY DRUG SCREEN: ICD-10-PCS | Mod: S$GLB,,, | Performed by: FAMILY MEDICINE

## 2019-02-01 PROCEDURE — 80305 DRUG TEST PRSMV DIR OPT OBS: CPT | Mod: S$GLB,,, | Performed by: FAMILY MEDICINE

## 2019-03-12 ENCOUNTER — OCCUPATIONAL HEALTH (OUTPATIENT)
Dept: URGENT CARE | Facility: CLINIC | Age: 54
End: 2019-03-12

## 2019-03-12 DIAGNOSIS — Z02.83 ENCOUNTER FOR DRUG SCREENING: Primary | ICD-10-CM

## 2019-03-12 PROCEDURE — 80305 PR COLLECTION ONLY DRUG SCREEN: ICD-10-PCS | Mod: S$GLB,,, | Performed by: INTERNAL MEDICINE

## 2019-03-12 PROCEDURE — 80305 DRUG TEST PRSMV DIR OPT OBS: CPT | Mod: S$GLB,,, | Performed by: INTERNAL MEDICINE

## 2019-03-12 NOTE — PROGRESS NOTES
Subjective:       Patient ID: Vicky Alvarado is a 53 y.o. female.    Vitals:  vitals were not taken for this visit.     Chief Complaint: Drug / Alcohol Assessment    Pt came in for drug screen      Drug / Alcohol Assessment       <OUCOOHADULT>    Objective:      Physical Exam    Assessment:       No diagnosis found.    Plan:         There are no diagnoses linked to this encounter.

## 2019-04-10 ENCOUNTER — OCCUPATIONAL HEALTH (OUTPATIENT)
Dept: URGENT CARE | Facility: CLINIC | Age: 54
End: 2019-04-10

## 2019-04-10 DIAGNOSIS — Z02.83 ENCOUNTER FOR DRUG SCREENING: Primary | ICD-10-CM

## 2019-04-10 PROCEDURE — 80305 PR COLLECTION ONLY DRUG SCREEN: ICD-10-PCS | Mod: S$GLB,,, | Performed by: FAMILY MEDICINE

## 2019-04-10 PROCEDURE — 80305 DRUG TEST PRSMV DIR OPT OBS: CPT | Mod: S$GLB,,, | Performed by: FAMILY MEDICINE

## 2019-04-30 ENCOUNTER — OCCUPATIONAL HEALTH (OUTPATIENT)
Dept: URGENT CARE | Facility: CLINIC | Age: 54
End: 2019-04-30

## 2019-04-30 DIAGNOSIS — Z02.83 ENCOUNTER FOR DRUG SCREENING: Primary | ICD-10-CM

## 2019-04-30 PROCEDURE — 80305 PR COLLECTION ONLY DRUG SCREEN: ICD-10-PCS | Mod: S$GLB,,, | Performed by: NURSE PRACTITIONER

## 2019-04-30 PROCEDURE — 80305 DRUG TEST PRSMV DIR OPT OBS: CPT | Mod: S$GLB,,, | Performed by: NURSE PRACTITIONER

## 2019-05-21 ENCOUNTER — OFFICE VISIT (OUTPATIENT)
Dept: URGENT CARE | Facility: CLINIC | Age: 54
End: 2019-05-21
Payer: COMMERCIAL

## 2019-05-21 VITALS
BODY MASS INDEX: 45.48 KG/M2 | RESPIRATION RATE: 19 BRPM | WEIGHT: 283 LBS | TEMPERATURE: 97 F | HEIGHT: 66 IN | OXYGEN SATURATION: 96 % | SYSTOLIC BLOOD PRESSURE: 157 MMHG | DIASTOLIC BLOOD PRESSURE: 74 MMHG | HEART RATE: 65 BPM

## 2019-05-21 DIAGNOSIS — K04.7 DENTAL ABSCESS: Primary | ICD-10-CM

## 2019-05-21 PROCEDURE — 99214 PR OFFICE/OUTPT VISIT, EST, LEVL IV, 30-39 MIN: ICD-10-PCS | Mod: 25,S$GLB,, | Performed by: NURSE PRACTITIONER

## 2019-05-21 PROCEDURE — 99214 OFFICE O/P EST MOD 30 MIN: CPT | Mod: 25,S$GLB,, | Performed by: NURSE PRACTITIONER

## 2019-05-21 PROCEDURE — 96372 PR INJECTION,THERAP/PROPH/DIAG2ST, IM OR SUBCUT: ICD-10-PCS | Mod: S$GLB,,, | Performed by: NURSE PRACTITIONER

## 2019-05-21 PROCEDURE — 96372 THER/PROPH/DIAG INJ SC/IM: CPT | Mod: S$GLB,,, | Performed by: NURSE PRACTITIONER

## 2019-05-21 PROCEDURE — 3008F PR BODY MASS INDEX (BMI) DOCUMENTED: ICD-10-PCS | Mod: CPTII,S$GLB,, | Performed by: NURSE PRACTITIONER

## 2019-05-21 PROCEDURE — 3078F PR MOST RECENT DIASTOLIC BLOOD PRESSURE < 80 MM HG: ICD-10-PCS | Mod: CPTII,S$GLB,, | Performed by: NURSE PRACTITIONER

## 2019-05-21 PROCEDURE — 3078F DIAST BP <80 MM HG: CPT | Mod: CPTII,S$GLB,, | Performed by: NURSE PRACTITIONER

## 2019-05-21 PROCEDURE — 3077F SYST BP >= 140 MM HG: CPT | Mod: CPTII,S$GLB,, | Performed by: NURSE PRACTITIONER

## 2019-05-21 PROCEDURE — 3008F BODY MASS INDEX DOCD: CPT | Mod: CPTII,S$GLB,, | Performed by: NURSE PRACTITIONER

## 2019-05-21 PROCEDURE — 3077F PR MOST RECENT SYSTOLIC BLOOD PRESSURE >= 140 MM HG: ICD-10-PCS | Mod: CPTII,S$GLB,, | Performed by: NURSE PRACTITIONER

## 2019-05-21 RX ORDER — AMOXICILLIN AND CLAVULANATE POTASSIUM 875; 125 MG/1; MG/1
1 TABLET, FILM COATED ORAL 2 TIMES DAILY
Qty: 20 TABLET | Refills: 0 | Status: SHIPPED | OUTPATIENT
Start: 2019-05-21 | End: 2019-05-31

## 2019-05-21 RX ORDER — CEFTRIAXONE 1 G/1
1 INJECTION, POWDER, FOR SOLUTION INTRAMUSCULAR; INTRAVENOUS
Status: COMPLETED | OUTPATIENT
Start: 2019-05-21 | End: 2019-05-21

## 2019-05-21 RX ADMIN — CEFTRIAXONE 1 G: 1 INJECTION, POWDER, FOR SOLUTION INTRAMUSCULAR; INTRAVENOUS at 04:05

## 2019-05-21 NOTE — PROGRESS NOTES
"Subjective:       Patient ID: Vicky Alvarado is a 54 y.o. female.    Vitals:  height is 5' 6" (1.676 m) and weight is 128.4 kg (283 lb). Her oral temperature is 97.1 °F (36.2 °C). Her blood pressure is 157/74 (abnormal) and her pulse is 65. Her respiration is 19 and oxygen saturation is 96%.     Chief Complaint: Abscess      Patient presents to clinic today with complaints of worsening possible dental abscess over the last few days.  Patient reports that she has poor dentition and she is currently between getting dentures.  Patient has been dealing with bad teeth for most of her life per patient.  Claims that she has been diagnosed with dental disease.  Patient currently has a partial denture but is awaiting to have the remaining teeth pulled.  Denies any fever or chills.  Reports that she is having some mild swelling to the right cheek.  Patient does report that she is planning a follow-up with her dentist.    Abscess   Chronicity:  New  Onset:  Yesterday  Progression Since Onset: worsening  Location:  Face and mouth (Right cheek)  Associated Symptoms: no fever, no chills, no sweats  Characteristics: swelling    Characteristics: not draining, no itching, not painful, no redness, no dryness, no scaling, no peeling, no bruising and no blistering    Pain Scale:  4/10  Ineffective treatments: Ibuprofen.  Relieved by:  Nothing  Worsened by:  Nothing      Constitution: Negative for chills, fatigue and fever.   HENT: Negative for congestion and sore throat.    Neck: Negative for painful lymph nodes.   Cardiovascular: Negative for chest pain and leg swelling.   Eyes: Negative for double vision and blurred vision.   Respiratory: Negative for cough and shortness of breath.    Gastrointestinal: Negative for nausea, vomiting and diarrhea.   Genitourinary: Negative for dysuria, frequency, urgency and history of kidney stones.   Musculoskeletal: Negative for joint pain, joint swelling, muscle cramps and muscle ache.   Skin: " Positive for abscess. Negative for color change, pale, rash, erythema and bruising.   Allergic/Immunologic: Negative for seasonal allergies.   Neurological: Negative for dizziness, history of vertigo, light-headedness, passing out and headaches.   Hematologic/Lymphatic: Negative for swollen lymph nodes.   Psychiatric/Behavioral: Negative for nervous/anxious, sleep disturbance and depression. The patient is not nervous/anxious.        Objective:      Physical Exam   Constitutional: She is oriented to person, place, and time. She appears well-developed and well-nourished.   HENT:   Head: Normocephalic and atraumatic. Head is without abrasion, without contusion and without laceration.       Right Ear: External ear normal.   Left Ear: External ear normal.   Nose: Nose normal.   Mouth/Throat: Uvula is midline, oropharynx is clear and moist and mucous membranes are normal. Dental abscesses present. Tonsils are 1+ on the right. Tonsils are 1+ on the left. No tonsillar exudate.       Eyes: Pupils are equal, round, and reactive to light. Conjunctivae, EOM and lids are normal.   Neck: Trachea normal, full passive range of motion without pain and phonation normal. Neck supple.   Cardiovascular: Normal rate, regular rhythm and normal heart sounds.   Pulmonary/Chest: Effort normal and breath sounds normal. No stridor. No respiratory distress.   Musculoskeletal: Normal range of motion.   Neurological: She is alert and oriented to person, place, and time.   Skin: Skin is warm, dry and intact. Capillary refill takes less than 2 seconds. No abrasion, no bruising, no burn, no ecchymosis, no laceration, no lesion and no rash noted. No erythema.   Psychiatric: She has a normal mood and affect. Her speech is normal and behavior is normal. Judgment and thought content normal. Cognition and memory are normal.   Nursing note and vitals reviewed.      Assessment:       1. Dental abscess        Plan:         Dental abscess  -     cefTRIAXone  injection 1 g  -     amoxicillin-clavulanate 875-125mg (AUGMENTIN) 875-125 mg per tablet; Take 1 tablet by mouth 2 (two) times daily. for 10 days  Dispense: 20 tablet; Refill: 0      Patient Instructions     Abscess (Antibiotic Treatment Only)  An abscess (sometimes called a boil) happens when bacteria get trapped under the skin and start to grow. Pus forms inside the abscess as the body responds to the bacteria. An abscess can happen with an insect bite, ingrown hair, blocked oil gland, pimple, cyst, or puncture wound.  In the early stages, your wound may be red and tender. For this stage, you may get antibiotics. If the abscess does not get better with antibiotics, it will need to be drained with a small cut.  Home care  These tips will help you care for your abscess at home:  · Soak the wound in hot water or apply hot packs (small towel soaked in hot water) to the area for 20 minutes at a time. Do this 3 to 4 times a day.  · Do not cut, squeeze, or pop the boil yourself.  · Apply antibiotic cream or ointment to the skin 3 to 4 times a day, unless something else was prescribed. Some ointments include an antibiotic plus a pain reliever.  · If your doctor prescribed antibiotics, do not stop taking them until you have finished the medicine or the doctor tells you to stop.  · You may use an over-the-counter pain medicine to control pain, unless another pain medicine was prescribed. If you have chronic liver or kidney disease or ever had a stomach ulcer or gastrointestinal bleeding, talk with your doctor before using these any of these.  Follow-up care  Follow up with your healthcare provider, or as advised. Check your wound each day for the signs of worsening infection listed below.  When to seek medical advice  Get prompt medical attention if any of these occur:  · An increase in redness or swelling  · Red streaks in the skin leading away from the abscess  · An increase in local pain or swelling  · Fever of 100.4ºF  "(38ºC) or higher, or as directed by your healthcare provider  · Pus or fluid coming from the abscess  · Boil returns after getting better  Date Last Reviewed: 9/1/2016  © 9255-1913 [employer]. [address] [place] [address], [place] [address]. All rights reserved. This information is not intended as a substitute for professional medical care. Always follow your healthcare professional's instructions.        Dental Abscess    An abscess is a pocket of pus at the tip of a tooth root in your jaw bone. It is caused by an infection at the root of the tooth. It can cause pain and swelling of the gum, cheek, or jaw. Pain may spread from the tooth to your ear or the area of your jaw on the same side. If the abscess isnt treated, it appears as a bubble or swelling on the gum near the tooth. The pressure that builds in this swelling is the source of the pain. More serious infections cause your face to swell.  An abscess can be caused by a crack in the tooth, a cavity, a gum infection, or a combination of these. Once the pulp of the tooth is exposed, bacteria can spread down the roots to the tip. If the bacteria are not stopped, they can damage the bone and soft tissue, and an abscess can form.  Home care  Follow these guidelines when caring for yourself at home:  · Avoid hot and cold foods and drinks. Your tooth may be sensitive to changes in temperature. Dont chew on the side of the infected tooth.  · If your tooth is chipped or cracked, or if there is a large open cavity, put oil of cloves directly on the tooth to relieve pain. You can buy oil of cloves at drugstores. Some pharmacies carry an over-the-counter "toothache kit." This contains a paste that you can put on the exposed tooth to make it less sensitive.  · Put a cold pack on your jaw over the sore area to help reduce pain.  · You may use over-the-counter medicine to ease pain, unless another medicine was prescribed. If you have chronic liver or kidney " disease, talk with your healthcare provider before using acetaminophen or ibuprofen. Also talk with your provider if youve had a stomach ulcer or GI bleeding.  · An antibiotic will be prescribed. Take it until finished, even if you are feeling better after a few days.  Follow-up care  Follow up with your dentist or an oral surgeon, or as advised. Once an infection occurs in a tooth, it will continue to be a problem until the infection is drained. This is done through surgery or a root canal. Or you may need to have your tooth pulled.  Call 911  Call 911 if any of these occur:  · Unusual drowsiness  · Headache or stiff neck  · Weakness or fainting  · Difficulty swallowing, breathing, or opening your mouth  · Swollen eyelids  When to seek medical advice  Call your healthcare provider right away if any of these occur:  · Your face becomes more swollen or red  · Pain gets worse or spreads to your neck  · Fever of 100.4º F (38.0º C) or higher, or as directed by your healthcare provider  · Pus drains from the tooth  Date Last Reviewed: 10/1/2016  © 4415-8776 [employer]. [address] [place] [address]. All rights reserved. This information is not intended as a substitute for professional medical care. Always follow your healthcare professional's instructions.      -Rocephin shot given here today.  -10 days of antibiotics.  -Follow up with your Dentist.  -IF symptoms worsen go to the ER.  Please follow up with your Primary care provider within 2-5 days if your signs and symptoms have not resolved or worsen.     If your condition worsens or fails to improve we recommend that you receive another evaluation at the emergency room immediately or contact your primary medical clinic to discuss your concerns.   You must understand that you have received an Urgent Care treatment only and that you may be released before all of your medical problems are known or treated. You, the patient, will arrange for  follow up care as instructed.

## 2019-05-21 NOTE — PATIENT INSTRUCTIONS
Abscess (Antibiotic Treatment Only)  An abscess (sometimes called a boil) happens when bacteria get trapped under the skin and start to grow. Pus forms inside the abscess as the body responds to the bacteria. An abscess can happen with an insect bite, ingrown hair, blocked oil gland, pimple, cyst, or puncture wound.  In the early stages, your wound may be red and tender. For this stage, you may get antibiotics. If the abscess does not get better with antibiotics, it will need to be drained with a small cut.  Home care  These tips will help you care for your abscess at home:  · Soak the wound in hot water or apply hot packs (small towel soaked in hot water) to the area for 20 minutes at a time. Do this 3 to 4 times a day.  · Do not cut, squeeze, or pop the boil yourself.  · Apply antibiotic cream or ointment to the skin 3 to 4 times a day, unless something else was prescribed. Some ointments include an antibiotic plus a pain reliever.  · If your doctor prescribed antibiotics, do not stop taking them until you have finished the medicine or the doctor tells you to stop.  · You may use an over-the-counter pain medicine to control pain, unless another pain medicine was prescribed. If you have chronic liver or kidney disease or ever had a stomach ulcer or gastrointestinal bleeding, talk with your doctor before using these any of these.  Follow-up care  Follow up with your healthcare provider, or as advised. Check your wound each day for the signs of worsening infection listed below.  When to seek medical advice  Get prompt medical attention if any of these occur:  · An increase in redness or swelling  · Red streaks in the skin leading away from the abscess  · An increase in local pain or swelling  · Fever of 100.4ºF (38ºC) or higher, or as directed by your healthcare provider  · Pus or fluid coming from the abscess  · Boil returns after getting better  Date Last Reviewed: 9/1/2016  © 4365-2387 [employer] [address] [place] [address]. All rights reserved. This information is not intended as a substitute for professional medical care. Always follow your healthcare professional's instructions.        Dental Abscess    An abscess is a pocket of pus at the tip of a tooth root in your jaw bone. It is caused by an infection at the root of the tooth. It can cause pain and swelling of the gum, cheek, or jaw. Pain may spread from the tooth to your ear or the area of your jaw on the same side. If the abscess isnt treated, it appears as a bubble or swelling on the gum near the tooth. The pressure that builds in this swelling is the source of the pain. More serious infections cause your face to swell.  An abscess can be caused by a crack in the tooth, a cavity, a gum infection, or a combination of these. Once the pulp of the tooth is exposed, bacteria can spread down the roots to the tip. If the bacteria are not stopped, they can damage the bone and soft tissue, and an abscess can form.  Home care  Follow these guidelines when caring for yourself at home:  · Avoid hot and cold foods and drinks. Your tooth may be sensitive to changes in temperature. Dont chew on the side of the infected tooth.  · If your tooth is chipped or cracked, or if there is a large open cavity, put oil of cloves directly on the tooth to relieve pain. You can buy oil of cloves at drugstores. Some pharmacies carry an over-the-counter "toothache kit." This contains a paste that you can put on the exposed tooth to make it less sensitive.  · Put a cold pack on your jaw over the sore area to help reduce pain.  · You may use over-the-counter medicine to ease pain, unless another medicine was prescribed. If you have chronic liver or kidney disease, talk with your healthcare provider before using acetaminophen or ibuprofen. Also talk with your provider if youve had a stomach ulcer or GI bleeding.  · An antibiotic will be prescribed. Take it " until finished, even if you are feeling better after a few days.  Follow-up care  Follow up with your dentist or an oral surgeon, or as advised. Once an infection occurs in a tooth, it will continue to be a problem until the infection is drained. This is done through surgery or a root canal. Or you may need to have your tooth pulled.  Call 911  Call 911 if any of these occur:  · Unusual drowsiness  · Headache or stiff neck  · Weakness or fainting  · Difficulty swallowing, breathing, or opening your mouth  · Swollen eyelids  When to seek medical advice  Call your healthcare provider right away if any of these occur:  · Your face becomes more swollen or red  · Pain gets worse or spreads to your neck  · Fever of 100.4º F (38.0º C) or higher, or as directed by your healthcare provider  · Pus drains from the tooth  Date Last Reviewed: 10/1/2016  © 2486-8286 [employer]. [address] [place] [address]. All rights reserved. This information is not intended as a substitute for professional medical care. Always follow your healthcare professional's instructions.      -Rocephin shot given here today.  -10 days of antibiotics.  -Follow up with your Dentist.  -IF symptoms worsen go to the ER.  Please follow up with your Primary care provider within 2-5 days if your signs and symptoms have not resolved or worsen.     If your condition worsens or fails to improve we recommend that you receive another evaluation at the emergency room immediately or contact your primary medical clinic to discuss your concerns.   You must understand that you have received an Urgent Care treatment only and that you may be released before all of your medical problems are known or treated. You, the patient, will arrange for follow up care as instructed.

## 2019-06-05 ENCOUNTER — OCCUPATIONAL HEALTH (OUTPATIENT)
Dept: URGENT CARE | Facility: CLINIC | Age: 54
End: 2019-06-05

## 2019-06-05 DIAGNOSIS — Z02.83 ENCOUNTER FOR DRUG SCREENING: Primary | ICD-10-CM

## 2019-06-06 ENCOUNTER — IMMUNIZATION (OUTPATIENT)
Dept: PHARMACY | Facility: CLINIC | Age: 54
End: 2019-06-06
Payer: COMMERCIAL

## 2019-06-13 ENCOUNTER — OCCUPATIONAL HEALTH (OUTPATIENT)
Dept: URGENT CARE | Facility: CLINIC | Age: 54
End: 2019-06-13

## 2019-06-13 DIAGNOSIS — Z02.83 EMPLOYMENT-RELATED DRUG TESTING, ENCOUNTER FOR: Primary | ICD-10-CM

## 2019-06-13 PROCEDURE — 80305 DRUG TEST PRSMV DIR OPT OBS: CPT | Mod: S$GLB,,, | Performed by: FAMILY MEDICINE

## 2019-06-13 PROCEDURE — 80305 PR COLLECTION ONLY DRUG SCREEN: ICD-10-PCS | Mod: S$GLB,,, | Performed by: FAMILY MEDICINE

## 2019-06-20 RX ORDER — DULOXETIN HYDROCHLORIDE 60 MG/1
120 CAPSULE, DELAYED RELEASE ORAL DAILY
Qty: 180 CAPSULE | Refills: 3 | OUTPATIENT
Start: 2019-06-20

## 2019-06-20 RX ORDER — TRAZODONE HYDROCHLORIDE 100 MG/1
100 TABLET ORAL NIGHTLY PRN
Qty: 90 TABLET | Refills: 3 | OUTPATIENT
Start: 2019-06-20

## 2019-06-20 RX ORDER — DULOXETIN HYDROCHLORIDE 60 MG/1
120 CAPSULE, DELAYED RELEASE ORAL DAILY
Qty: 180 CAPSULE | Refills: 3 | Status: CANCELLED | OUTPATIENT
Start: 2019-06-20

## 2019-06-20 RX ORDER — TRAZODONE HYDROCHLORIDE 100 MG/1
100 TABLET ORAL NIGHTLY PRN
Qty: 90 TABLET | Refills: 3 | Status: CANCELLED | OUTPATIENT
Start: 2019-06-20

## 2019-06-24 ENCOUNTER — PATIENT MESSAGE (OUTPATIENT)
Dept: PSYCHIATRY | Facility: CLINIC | Age: 54
End: 2019-06-24

## 2019-06-25 RX ORDER — TRAZODONE HYDROCHLORIDE 100 MG/1
100 TABLET ORAL NIGHTLY PRN
Qty: 90 TABLET | Refills: 0 | Status: SHIPPED | OUTPATIENT
Start: 2019-06-25 | End: 2019-07-18 | Stop reason: SDUPTHER

## 2019-06-25 RX ORDER — DULOXETIN HYDROCHLORIDE 60 MG/1
120 CAPSULE, DELAYED RELEASE ORAL DAILY
Qty: 180 CAPSULE | Refills: 0 | Status: SHIPPED | OUTPATIENT
Start: 2019-06-25 | End: 2019-07-18 | Stop reason: SDUPTHER

## 2019-07-18 ENCOUNTER — OFFICE VISIT (OUTPATIENT)
Dept: PSYCHIATRY | Facility: CLINIC | Age: 54
End: 2019-07-18
Payer: COMMERCIAL

## 2019-07-18 VITALS
HEIGHT: 66 IN | SYSTOLIC BLOOD PRESSURE: 130 MMHG | BODY MASS INDEX: 45.68 KG/M2 | DIASTOLIC BLOOD PRESSURE: 78 MMHG | HEART RATE: 62 BPM

## 2019-07-18 DIAGNOSIS — F32.A DEPRESSIVE DISORDER: Primary | ICD-10-CM

## 2019-07-18 DIAGNOSIS — F11.21 OPIOID USE DISORDER, MODERATE, IN SUSTAINED REMISSION, DEPENDENCE: ICD-10-CM

## 2019-07-18 PROCEDURE — 99999 PR PBB SHADOW E&M-EST. PATIENT-LVL II: CPT | Mod: PBBFAC,,, | Performed by: PSYCHIATRY & NEUROLOGY

## 2019-07-18 PROCEDURE — 3075F PR MOST RECENT SYSTOLIC BLOOD PRESS GE 130-139MM HG: ICD-10-PCS | Mod: CPTII,S$GLB,, | Performed by: PSYCHIATRY & NEUROLOGY

## 2019-07-18 PROCEDURE — 3008F PR BODY MASS INDEX (BMI) DOCUMENTED: ICD-10-PCS | Mod: CPTII,S$GLB,, | Performed by: PSYCHIATRY & NEUROLOGY

## 2019-07-18 PROCEDURE — 3075F SYST BP GE 130 - 139MM HG: CPT | Mod: CPTII,S$GLB,, | Performed by: PSYCHIATRY & NEUROLOGY

## 2019-07-18 PROCEDURE — 99213 OFFICE O/P EST LOW 20 MIN: CPT | Mod: S$GLB,,, | Performed by: PSYCHIATRY & NEUROLOGY

## 2019-07-18 PROCEDURE — 3078F DIAST BP <80 MM HG: CPT | Mod: CPTII,S$GLB,, | Performed by: PSYCHIATRY & NEUROLOGY

## 2019-07-18 PROCEDURE — 99999 PR PBB SHADOW E&M-EST. PATIENT-LVL II: ICD-10-PCS | Mod: PBBFAC,,, | Performed by: PSYCHIATRY & NEUROLOGY

## 2019-07-18 PROCEDURE — 99213 PR OFFICE/OUTPT VISIT, EST, LEVL III, 20-29 MIN: ICD-10-PCS | Mod: S$GLB,,, | Performed by: PSYCHIATRY & NEUROLOGY

## 2019-07-18 PROCEDURE — 3008F BODY MASS INDEX DOCD: CPT | Mod: CPTII,S$GLB,, | Performed by: PSYCHIATRY & NEUROLOGY

## 2019-07-18 PROCEDURE — 3078F PR MOST RECENT DIASTOLIC BLOOD PRESSURE < 80 MM HG: ICD-10-PCS | Mod: CPTII,S$GLB,, | Performed by: PSYCHIATRY & NEUROLOGY

## 2019-07-18 RX ORDER — TRAZODONE HYDROCHLORIDE 100 MG/1
100 TABLET ORAL NIGHTLY PRN
Qty: 90 TABLET | Refills: 3 | Status: SHIPPED | OUTPATIENT
Start: 2019-07-18 | End: 2020-07-13 | Stop reason: SDUPTHER

## 2019-07-18 RX ORDER — DULOXETIN HYDROCHLORIDE 60 MG/1
120 CAPSULE, DELAYED RELEASE ORAL DAILY
Qty: 180 CAPSULE | Refills: 3 | Status: SHIPPED | OUTPATIENT
Start: 2019-07-18 | End: 2020-07-13 | Stop reason: SDUPTHER

## 2019-07-18 NOTE — PROGRESS NOTES
Outpatient Psychiatry Follow-Up Visit (MD/NP)    7/18/2019    Clinical Status of Patient:  Outpatient (Ambulatory)    Chief Complaint:  Vicky Alvarado is a 54 y.o. OIBW   female ( dark neck length hair)  who presents today for follow-up of depression and substance problems.  Met with patient.   PCP - Dr Cordero  [facility] Interval History and Content of Current Session:  Interim Events/Subjective Report/Content of Current Session: Pt is s/p ABU in June - July 2015  . Sobriety is intact . Fa attends AA with pt as he  was  a chronic drinker . He later stopped for 10 yrs then dabbled some when available . Pt has  past hx of opiates, soma and Xanax but relapsed on opiates 3 yrs ago prior to rehab . RNP  Grad as of  3/2019 .   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  Enjoys her job . Alcohol was never a sig concern . Has regained ICU privileges , OT, 18 tests /yr  and now  narcs  .     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    Very proud of her continued  weight loss but then gained weight .  Declined gastric  bypass ; family members have had procedures w limited success .  [place]     Stressor :  74 aunt with stage 4 breast CA under care  doing well  With good result of less mets . Taking close family  to [facility] disclosed opiate dependence and went to [place] .     Lost weight with monitoring what she eats .     Psychiatric Review Of Systems - Is patient experiencing or having changes in:  sleep:  No , trouble with global insomnia resolved with trazodone   appetite: no, conscious  lifestyle change   weight: down  24#  , 283( 6/18)  weighs does not want gastric bypass / procedures ;   energy/anergy: no  interest/pleasure/anhedonia: no, she has adopted 2 cats , now grown ; enjoyed trip to [place]   somatic symptoms: no, Cymbalta helps chronic knee pain    libido: no  anxiety/panic: no  guilty/hopelessness: no  concentration: no  S.I.B.s/risky behavior: no  Irritability: no  Racing thoughts: no  Impulsive behaviors: no  Paranoia:no  AVH:no    Cymbalta 60 mg 2 qday  ( $ 50 in 2016 ) ; [id number]  mg qhs     Psychotherapy:  · Target symptoms: depression, substance abuse  · Why chosen therapy is appropriate versus another modality: relevant to diagnosis, patient responds to this modality, evidence based practice  · Outcome monitoring methods: self-report, observation  · Therapeutic intervention type: supportive psychotherapy  · Topics discussed/themes: work stress, substance abuse  · The patient's response to the intervention is accepting. The patient's progress toward treatment goals is excellent.   · Duration of intervention: 15  minutes.    Review of Systems   · Prob Pert. 1 sys, Ext. psych +2 add., Comp. 10-14 sys  · PSYCHIATRIC: Pertinant items are noted in the narrative.  · CONSTITUTIONAL: desired weight loss   · MUSCULOSKELETAL:  tolerable knee pain and  stiffness   · NEUROLOGIC: No weakness, sensory changes, seizures, confusion, memory loss, tremor or other abnormal movements.  · ENDOCRINE: No polydipsia or polyuria.  · INTEGUMENTARY: No rashes or lacerations.  · EYES: No exophthalmos, jaundice or blindness.  · ENT: No dizziness, tinnitus or hearing loss.  · RESPIRATORY: No shortness of breath.  · CARDIOVASCULAR: No tachycardia or chest pain.  · GASTROINTESTINAL: No nausea, vomiting, pain, constipation or diarrhea.  · GENITOURINARY: No frequency, dysuria or sexual dysfunction.  · HEMATOLOGIC/LYMPHATIC: No excessive bleeding, prolonged or excessive bleeding after dental extraction/injury.  · ALLERGIC/IMMUNOLOGIC: No allergic response to materials, foods or animals at this time.    Past Medical, Family and Social History: The patient's past medical, family and social history have been reviewed and updated as appropriate within the electronic medical record -  "see encounter notes.    Compliance: yes    Side effects: None    Risk Parameters:  Patient reports no suicidal ideation  Patient reports no homicidal ideation  Patient reports no self-injurious behavior  Patient reports no violent behavior    Exam (detailed: at least 9 elements; comprehensive: all 15 elements)   Constitutional  Vitals:  Most recent vital signs, dated less than 90 days prior to this appointment, were reviewed.   Vitals:    07/18/19 1349   BP: 130/78   Pulse: 62   Height: 5' 6" (1.676 m)    height is 5' 6" (1.676 m). Her blood pressure is 130/78 and her pulse is 62.          General:  unremarkable, age appropriate, casually dressed, overweight     Musculoskeletal  Muscle Strength/Tone:  no spasicity, no rigidity   Gait & Station:  non-ataxic     Psychiatric  Speech:  no latency; no press   Mood & Affect:  euthymic  congruent and appropriate   Thought Process:  normal and logical   Associations:  intact   Thought Content:  normal, no suicidality, no homicidality, delusions, or paranoia   Insight:  has awareness of illness   Judgement: behavior is adequate to circumstances   Orientation:  grossly intact   Memory: intact for content of interview   Language: grossly intact   Attention Span & Concentration:  able to focus   Fund of Knowledge:  intact and appropriate to age and level of education     Assessment and Diagnosis   Status/Progress: Based on the examination today, the patient's problem(s) is/are well controlled.  New problems have not been presented today.   Lack of compliance are not complicating management of the primary condition.  There are no active rule-out diagnoses for this patient at this time.     General Impression: doing well emotionally      ICD-10-CM ICD-9-CM   1. Depressive disorder F32.9 311   2. Opioid use disorder, moderate, in sustained remission, dependence F11.21 304.03       Intervention/Counseling/Treatment Plan   · Continue cymbalta ;  trazodone  100 mg qhs      No seizure " contraindication . Educated about risk of serotonin syndrome .        Restart  Exercising  ; see pcp for exercise clearance         Return to Clinic: 6 months

## 2019-08-12 ENCOUNTER — IMMUNIZATION (OUTPATIENT)
Dept: PHARMACY | Facility: CLINIC | Age: 54
End: 2019-08-12
Payer: COMMERCIAL

## 2020-01-28 ENCOUNTER — HOSPITAL ENCOUNTER (INPATIENT)
Facility: HOSPITAL | Age: 55
LOS: 2 days | Discharge: HOME | DRG: 308 | End: 2020-01-30
Attending: EMERGENCY MEDICINE | Admitting: EMERGENCY MEDICINE
Payer: COMMERCIAL

## 2020-01-28 DIAGNOSIS — I50.9 NEW ONSET OF CONGESTIVE HEART FAILURE: ICD-10-CM

## 2020-01-28 DIAGNOSIS — R07.9 CHEST PAIN: ICD-10-CM

## 2020-01-28 DIAGNOSIS — F11.21 OPIOID DEPENDENCE IN REMISSION: ICD-10-CM

## 2020-01-28 DIAGNOSIS — I48.19 PERSISTENT ATRIAL FIBRILLATION: ICD-10-CM

## 2020-01-28 DIAGNOSIS — I50.23 ACUTE ON CHRONIC SYSTOLIC (CONGESTIVE) HEART FAILURE: ICD-10-CM

## 2020-01-28 DIAGNOSIS — I49.9 ARRHYTHMIA: ICD-10-CM

## 2020-01-28 DIAGNOSIS — I48.91 NEW ONSET ATRIAL FIBRILLATION: ICD-10-CM

## 2020-01-28 DIAGNOSIS — I48.91 A-FIB: ICD-10-CM

## 2020-01-28 DIAGNOSIS — I48.91 ATRIAL FIBRILLATION, UNSPECIFIED TYPE: ICD-10-CM

## 2020-01-28 DIAGNOSIS — I48.91 ATRIAL FIBRILLATION WITH RVR: Primary | ICD-10-CM

## 2020-01-28 DIAGNOSIS — I10 ESSENTIAL HYPERTENSION: ICD-10-CM

## 2020-01-28 LAB
ALBUMIN SERPL BCP-MCNC: 4 G/DL (ref 3.5–5.2)
ALP SERPL-CCNC: 91 U/L (ref 55–135)
ALT SERPL W/O P-5'-P-CCNC: 22 U/L (ref 10–44)
ANION GAP SERPL CALC-SCNC: 13 MMOL/L (ref 8–16)
ASCENDING AORTA: 2.89 CM
AST SERPL-CCNC: 41 U/L (ref 10–40)
AV INDEX (PROSTH): 0.44
AV MEAN GRADIENT: 8 MMHG
AV PEAK GRADIENT: 13 MMHG
AV VALVE AREA: 1.67 CM2
AV VELOCITY RATIO: 0.4
BASOPHILS # BLD AUTO: 0.1 K/UL (ref 0–0.2)
BASOPHILS NFR BLD: 1.4 % (ref 0–1.9)
BILIRUB SERPL-MCNC: 0.9 MG/DL (ref 0.1–1)
BNP SERPL-MCNC: 354 PG/ML (ref 0–99)
BSA FOR ECHO PROCEDURE: 2.54 M2
BUN SERPL-MCNC: 12 MG/DL (ref 6–20)
BUN SERPL-MCNC: 13 MG/DL (ref 6–30)
CALCIUM SERPL-MCNC: 9.7 MG/DL (ref 8.7–10.5)
CHLORIDE SERPL-SCNC: 102 MMOL/L (ref 95–110)
CHLORIDE SERPL-SCNC: 103 MMOL/L (ref 95–110)
CO2 SERPL-SCNC: 21 MMOL/L (ref 23–29)
CREAT SERPL-MCNC: 1.1 MG/DL (ref 0.5–1.4)
CREAT SERPL-MCNC: 1.1 MG/DL (ref 0.5–1.4)
CV ECHO LV RWT: 0.4 CM
DIFFERENTIAL METHOD: ABNORMAL
DOP CALC AO PEAK VEL: 1.77 M/S
DOP CALC AO VTI: 32.11 CM
DOP CALC LVOT AREA: 3.8 CM2
DOP CALC LVOT DIAMETER: 2.2 CM
DOP CALC LVOT PEAK VEL: 0.7 M/S
DOP CALC LVOT STROKE VOLUME: 53.69 CM3
DOP CALCLVOT PEAK VEL VTI: 14.13 CM
ECHO LV POSTERIOR WALL: 1.05 CM (ref 0.6–1.1)
EOSINOPHIL # BLD AUTO: 0.2 K/UL (ref 0–0.5)
EOSINOPHIL NFR BLD: 2.7 % (ref 0–8)
ERYTHROCYTE [DISTWIDTH] IN BLOOD BY AUTOMATED COUNT: 15.6 % (ref 11.5–14.5)
EST. GFR  (AFRICAN AMERICAN): >60 ML/MIN/1.73 M^2
EST. GFR  (NON AFRICAN AMERICAN): 56.7 ML/MIN/1.73 M^2
FRACTIONAL SHORTENING: 18 % (ref 28–44)
GLUCOSE SERPL-MCNC: 173 MG/DL (ref 70–110)
GLUCOSE SERPL-MCNC: 179 MG/DL (ref 70–110)
HCT VFR BLD AUTO: 40.3 % (ref 37–48.5)
HCT VFR BLD CALC: 40 %PCV (ref 36–54)
HGB BLD-MCNC: 12.3 G/DL (ref 12–16)
IMM GRANULOCYTES # BLD AUTO: 0.02 K/UL (ref 0–0.04)
IMM GRANULOCYTES NFR BLD AUTO: 0.3 % (ref 0–0.5)
INR PPP: 1.1 (ref 0.8–1.2)
INTERVENTRICULAR SEPTUM: 1.14 CM (ref 0.6–1.1)
LA MAJOR: 6.68 CM
LA MINOR: 6.5 CM
LA WIDTH: 3.98 CM
LEFT ATRIUM SIZE: 4.85 CM
LEFT ATRIUM VOLUME INDEX: 45.4 ML/M2
LEFT ATRIUM VOLUME: 108.11 CM3
LEFT INTERNAL DIMENSION IN SYSTOLE: 4.25 CM (ref 2.1–4)
LEFT VENTRICLE DIASTOLIC VOLUME INDEX: 54.4 ML/M2
LEFT VENTRICLE DIASTOLIC VOLUME: 129.63 ML
LEFT VENTRICLE MASS INDEX: 92 G/M2
LEFT VENTRICLE SYSTOLIC VOLUME INDEX: 33.8 ML/M2
LEFT VENTRICLE SYSTOLIC VOLUME: 80.61 ML
LEFT VENTRICULAR INTERNAL DIMENSION IN DIASTOLE: 5.2 CM (ref 3.5–6)
LEFT VENTRICULAR MASS: 219.4 G
LYMPHOCYTES # BLD AUTO: 1.1 K/UL (ref 1–4.8)
LYMPHOCYTES NFR BLD: 16.1 % (ref 18–48)
MAGNESIUM SERPL-MCNC: 1.7 MG/DL (ref 1.6–2.6)
MCH RBC QN AUTO: 26.1 PG (ref 27–31)
MCHC RBC AUTO-ENTMCNC: 30.5 G/DL (ref 32–36)
MCV RBC AUTO: 86 FL (ref 82–98)
MONOCYTES # BLD AUTO: 0.7 K/UL (ref 0.3–1)
MONOCYTES NFR BLD: 9.7 % (ref 4–15)
NEUTROPHILS # BLD AUTO: 4.8 K/UL (ref 1.8–7.7)
NEUTROPHILS NFR BLD: 69.8 % (ref 38–73)
NRBC BLD-RTO: 0 /100 WBC
PISA TR MAX VEL: 2.53 M/S
PLATELET # BLD AUTO: 284 K/UL (ref 150–350)
PMV BLD AUTO: 10.3 FL (ref 9.2–12.9)
POC IONIZED CALCIUM: 1.04 MMOL/L (ref 1.06–1.42)
POC TCO2 (MEASURED): 23 MMOL/L (ref 23–29)
POTASSIUM BLD-SCNC: 3.8 MMOL/L (ref 3.5–5.1)
POTASSIUM SERPL-SCNC: 4 MMOL/L (ref 3.5–5.1)
PROT SERPL-MCNC: 7.7 G/DL (ref 6–8.4)
PROTHROMBIN TIME: 11.2 SEC (ref 9–12.5)
RA MAJOR: 6.51 CM
RA PRESSURE: 3 MMHG
RA WIDTH: 3.76 CM
RBC # BLD AUTO: 4.71 M/UL (ref 4–5.4)
RIGHT VENTRICULAR END-DIASTOLIC DIMENSION: 3.45 CM
SAMPLE: ABNORMAL
SINUS: 2.65 CM
SODIUM BLD-SCNC: 136 MMOL/L (ref 136–145)
SODIUM SERPL-SCNC: 137 MMOL/L (ref 136–145)
STJ: 2.69 CM
TDI LATERAL: 0.09 M/S
TDI SEPTAL: 0.06 M/S
TDI: 0.08 M/S
TR MAX PG: 26 MMHG
TRICUSPID ANNULAR PLANE SYSTOLIC EXCURSION: 1.81 CM
TROPONIN I SERPL DL<=0.01 NG/ML-MCNC: 0.01 NG/ML (ref 0–0.03)
TSH SERPL DL<=0.005 MIU/L-ACNC: 1.23 UIU/ML (ref 0.4–4)
TV REST PULMONARY ARTERY PRESSURE: 29 MMHG
WBC # BLD AUTO: 6.94 K/UL (ref 3.9–12.7)

## 2020-01-28 PROCEDURE — 63600175 PHARM REV CODE 636 W HCPCS: Performed by: HOSPITALIST

## 2020-01-28 PROCEDURE — 80047 BASIC METABLC PNL IONIZED CA: CPT

## 2020-01-28 PROCEDURE — 99291 PR CRITICAL CARE, E/M 30-74 MINUTES: ICD-10-PCS | Mod: ,,, | Performed by: EMERGENCY MEDICINE

## 2020-01-28 PROCEDURE — 96376 TX/PRO/DX INJ SAME DRUG ADON: CPT

## 2020-01-28 PROCEDURE — 20600001 HC STEP DOWN PRIVATE ROOM

## 2020-01-28 PROCEDURE — 96361 HYDRATE IV INFUSION ADD-ON: CPT

## 2020-01-28 PROCEDURE — 99291 CRITICAL CARE FIRST HOUR: CPT | Mod: ,,, | Performed by: EMERGENCY MEDICINE

## 2020-01-28 PROCEDURE — 25000003 PHARM REV CODE 250: Performed by: FAMILY MEDICINE

## 2020-01-28 PROCEDURE — 93005 ELECTROCARDIOGRAM TRACING: CPT

## 2020-01-28 PROCEDURE — 80053 COMPREHEN METABOLIC PANEL: CPT

## 2020-01-28 PROCEDURE — 63600175 PHARM REV CODE 636 W HCPCS: Performed by: EMERGENCY MEDICINE

## 2020-01-28 PROCEDURE — 25000003 PHARM REV CODE 250: Performed by: NURSE PRACTITIONER

## 2020-01-28 PROCEDURE — 83880 ASSAY OF NATRIURETIC PEPTIDE: CPT

## 2020-01-28 PROCEDURE — 25000003 PHARM REV CODE 250: Performed by: EMERGENCY MEDICINE

## 2020-01-28 PROCEDURE — 96374 THER/PROPH/DIAG INJ IV PUSH: CPT

## 2020-01-28 PROCEDURE — 85610 PROTHROMBIN TIME: CPT

## 2020-01-28 PROCEDURE — 83735 ASSAY OF MAGNESIUM: CPT

## 2020-01-28 PROCEDURE — 93010 EKG 12-LEAD: ICD-10-PCS | Mod: 76,,, | Performed by: INTERNAL MEDICINE

## 2020-01-28 PROCEDURE — 99285 EMERGENCY DEPT VISIT HI MDM: CPT | Mod: 25

## 2020-01-28 PROCEDURE — 96375 TX/PRO/DX INJ NEW DRUG ADDON: CPT

## 2020-01-28 PROCEDURE — 99223 PR INITIAL HOSPITAL CARE,LEVL III: ICD-10-PCS | Mod: ,,, | Performed by: NURSE PRACTITIONER

## 2020-01-28 PROCEDURE — 85025 COMPLETE CBC W/AUTO DIFF WBC: CPT

## 2020-01-28 PROCEDURE — 63600175 PHARM REV CODE 636 W HCPCS: Performed by: NURSE PRACTITIONER

## 2020-01-28 PROCEDURE — 84484 ASSAY OF TROPONIN QUANT: CPT

## 2020-01-28 PROCEDURE — 99223 1ST HOSP IP/OBS HIGH 75: CPT | Mod: ,,, | Performed by: NURSE PRACTITIONER

## 2020-01-28 PROCEDURE — 93010 ELECTROCARDIOGRAM REPORT: CPT | Mod: ,,, | Performed by: INTERNAL MEDICINE

## 2020-01-28 PROCEDURE — 93010 ELECTROCARDIOGRAM REPORT: CPT | Mod: 76,,, | Performed by: INTERNAL MEDICINE

## 2020-01-28 PROCEDURE — 84443 ASSAY THYROID STIM HORMONE: CPT

## 2020-01-28 RX ORDER — DILTIAZEM HCL 1 MG/ML
5 INJECTION, SOLUTION INTRAVENOUS CONTINUOUS
Status: DISCONTINUED | OUTPATIENT
Start: 2020-01-28 | End: 2020-01-28

## 2020-01-28 RX ORDER — LISINOPRIL 20 MG/1
40 TABLET ORAL DAILY
Status: DISCONTINUED | OUTPATIENT
Start: 2020-01-28 | End: 2020-01-30

## 2020-01-28 RX ORDER — LANOLIN ALCOHOL/MO/W.PET/CERES
1000 CREAM (GRAM) TOPICAL DAILY
Status: DISCONTINUED | OUTPATIENT
Start: 2020-01-28 | End: 2020-01-30 | Stop reason: HOSPADM

## 2020-01-28 RX ORDER — ATENOLOL 50 MG/1
50 TABLET ORAL 2 TIMES DAILY
Status: DISCONTINUED | OUTPATIENT
Start: 2020-01-28 | End: 2020-01-29

## 2020-01-28 RX ORDER — MAGNESIUM SULFATE HEPTAHYDRATE 40 MG/ML
2 INJECTION, SOLUTION INTRAVENOUS
Status: COMPLETED | OUTPATIENT
Start: 2020-01-28 | End: 2020-01-28

## 2020-01-28 RX ORDER — FUROSEMIDE 10 MG/ML
40 INJECTION INTRAMUSCULAR; INTRAVENOUS 2 TIMES DAILY
Status: DISCONTINUED | OUTPATIENT
Start: 2020-01-28 | End: 2020-01-30

## 2020-01-28 RX ORDER — ASPIRIN 325 MG
325 TABLET ORAL
Status: COMPLETED | OUTPATIENT
Start: 2020-01-28 | End: 2020-01-28

## 2020-01-28 RX ORDER — ATENOLOL 50 MG/1
50 TABLET ORAL 2 TIMES DAILY
Status: DISCONTINUED | OUTPATIENT
Start: 2020-01-28 | End: 2020-01-28

## 2020-01-28 RX ORDER — DILTIAZEM HCL 1 MG/ML
5 INJECTION, SOLUTION INTRAVENOUS
Status: COMPLETED | OUTPATIENT
Start: 2020-01-28 | End: 2020-01-28

## 2020-01-28 RX ORDER — TRAZODONE HYDROCHLORIDE 50 MG/1
100 TABLET ORAL NIGHTLY PRN
Status: DISCONTINUED | OUTPATIENT
Start: 2020-01-28 | End: 2020-01-30 | Stop reason: HOSPADM

## 2020-01-28 RX ORDER — SODIUM CHLORIDE 0.9 % (FLUSH) 0.9 %
10 SYRINGE (ML) INJECTION
Status: DISCONTINUED | OUTPATIENT
Start: 2020-01-28 | End: 2020-01-30 | Stop reason: HOSPADM

## 2020-01-28 RX ORDER — HYDROCODONE BITARTRATE AND ACETAMINOPHEN 7.5; 325 MG/1; MG/1
1 TABLET ORAL EVERY 6 HOURS PRN
Status: DISCONTINUED | OUTPATIENT
Start: 2020-01-28 | End: 2020-01-30 | Stop reason: HOSPADM

## 2020-01-28 RX ORDER — DILTIAZEM HYDROCHLORIDE 5 MG/ML
20 INJECTION INTRAVENOUS
Status: COMPLETED | OUTPATIENT
Start: 2020-01-28 | End: 2020-01-28

## 2020-01-28 RX ORDER — AMOXICILLIN 250 MG
1 CAPSULE ORAL 2 TIMES DAILY
Status: DISCONTINUED | OUTPATIENT
Start: 2020-01-28 | End: 2020-01-30 | Stop reason: HOSPADM

## 2020-01-28 RX ORDER — DULOXETIN HYDROCHLORIDE 60 MG/1
120 CAPSULE, DELAYED RELEASE ORAL DAILY
Status: DISCONTINUED | OUTPATIENT
Start: 2020-01-28 | End: 2020-01-30 | Stop reason: HOSPADM

## 2020-01-28 RX ORDER — POLYETHYLENE GLYCOL 3350 17 G/17G
17 POWDER, FOR SOLUTION ORAL 2 TIMES DAILY PRN
Status: DISCONTINUED | OUTPATIENT
Start: 2020-01-28 | End: 2020-01-30 | Stop reason: HOSPADM

## 2020-01-28 RX ORDER — METOPROLOL TARTRATE 1 MG/ML
5 INJECTION, SOLUTION INTRAVENOUS
Status: COMPLETED | OUTPATIENT
Start: 2020-01-28 | End: 2020-01-28

## 2020-01-28 RX ORDER — FUROSEMIDE 10 MG/ML
40 INJECTION INTRAMUSCULAR; INTRAVENOUS
Status: COMPLETED | OUTPATIENT
Start: 2020-01-28 | End: 2020-01-28

## 2020-01-28 RX ORDER — AMOXICILLIN 500 MG/1
500 CAPSULE ORAL 3 TIMES DAILY
Status: DISCONTINUED | OUTPATIENT
Start: 2020-01-28 | End: 2020-01-30 | Stop reason: HOSPADM

## 2020-01-28 RX ORDER — ACETAMINOPHEN 325 MG/1
650 TABLET ORAL EVERY 4 HOURS PRN
Status: DISCONTINUED | OUTPATIENT
Start: 2020-01-28 | End: 2020-01-30 | Stop reason: HOSPADM

## 2020-01-28 RX ORDER — OMEGA-3/DHA/EPA/FISH OIL 300-1000MG
1 CAPSULE,DELAYED RELEASE (ENTERIC COATED) ORAL DAILY
Status: DISCONTINUED | OUTPATIENT
Start: 2020-01-28 | End: 2020-01-30 | Stop reason: HOSPADM

## 2020-01-28 RX ORDER — ONDANSETRON 8 MG/1
8 TABLET, ORALLY DISINTEGRATING ORAL EVERY 8 HOURS PRN
Status: DISCONTINUED | OUTPATIENT
Start: 2020-01-28 | End: 2020-01-30 | Stop reason: HOSPADM

## 2020-01-28 RX ORDER — ONDANSETRON 2 MG/ML
4 INJECTION INTRAMUSCULAR; INTRAVENOUS EVERY 8 HOURS PRN
Status: DISCONTINUED | OUTPATIENT
Start: 2020-01-28 | End: 2020-01-30 | Stop reason: HOSPADM

## 2020-01-28 RX ORDER — DILTIAZEM HCL/D5W 125 MG/125
5 PLASTIC BAG, INJECTION (ML) INTRAVENOUS CONTINUOUS
Status: DISCONTINUED | OUTPATIENT
Start: 2020-01-28 | End: 2020-01-29

## 2020-01-28 RX ORDER — BISACODYL 10 MG
10 SUPPOSITORY, RECTAL RECTAL DAILY PRN
Status: DISCONTINUED | OUTPATIENT
Start: 2020-01-28 | End: 2020-01-30 | Stop reason: HOSPADM

## 2020-01-28 RX ORDER — PANTOPRAZOLE SODIUM 40 MG/1
40 TABLET, DELAYED RELEASE ORAL DAILY
Status: DISCONTINUED | OUTPATIENT
Start: 2020-01-28 | End: 2020-01-30 | Stop reason: HOSPADM

## 2020-01-28 RX ADMIN — APIXABAN 5 MG: 5 TABLET, FILM COATED ORAL at 09:01

## 2020-01-28 RX ADMIN — FUROSEMIDE 40 MG: 10 INJECTION, SOLUTION INTRAMUSCULAR; INTRAVENOUS at 06:01

## 2020-01-28 RX ADMIN — FUROSEMIDE 40 MG: 10 INJECTION, SOLUTION INTRAMUSCULAR; INTRAVENOUS at 05:01

## 2020-01-28 RX ADMIN — THERA TABS 1 TABLET: TAB at 10:01

## 2020-01-28 RX ADMIN — TRAZODONE HYDROCHLORIDE 100 MG: 50 TABLET ORAL at 09:01

## 2020-01-28 RX ADMIN — ASPIRIN 325 MG ORAL TABLET 325 MG: 325 PILL ORAL at 05:01

## 2020-01-28 RX ADMIN — APIXABAN 5 MG: 5 TABLET, FILM COATED ORAL at 10:01

## 2020-01-28 RX ADMIN — MAGNESIUM SULFATE IN WATER 2 G: 40 INJECTION, SOLUTION INTRAVENOUS at 05:01

## 2020-01-28 RX ADMIN — SODIUM CHLORIDE 500 ML: 0.9 INJECTION, SOLUTION INTRAVENOUS at 05:01

## 2020-01-28 RX ADMIN — MAGNESIUM SULFATE IN WATER 2 G: 40 INJECTION, SOLUTION INTRAVENOUS at 09:01

## 2020-01-28 RX ADMIN — CYANOCOBALAMIN TAB 1000 MCG 1000 MCG: 1000 TAB at 10:01

## 2020-01-28 RX ADMIN — DILTIAZEM HYDROCHLORIDE 5 MG/HR: 5 INJECTION INTRAVENOUS at 06:01

## 2020-01-28 RX ADMIN — AMOXICILLIN 500 MG: 500 CAPSULE ORAL at 09:01

## 2020-01-28 RX ADMIN — METOPROLOL TARTRATE 5 MG: 5 INJECTION, SOLUTION INTRAVENOUS at 05:01

## 2020-01-28 RX ADMIN — ACETAMINOPHEN 650 MG: 325 TABLET ORAL at 01:01

## 2020-01-28 RX ADMIN — ATENOLOL 50 MG: 50 TABLET ORAL at 09:01

## 2020-01-28 RX ADMIN — DILTIAZEM HYDROCHLORIDE 20 MG: 5 INJECTION INTRAVENOUS at 06:01

## 2020-01-28 RX ADMIN — METOPROLOL TARTRATE 5 MG: 5 INJECTION INTRAVENOUS at 05:01

## 2020-01-28 RX ADMIN — SENNOSIDES AND DOCUSATE SODIUM 1 TABLET: 8.6; 5 TABLET ORAL at 09:01

## 2020-01-28 RX ADMIN — HYDROCODONE BITARTRATE AND ACETAMINOPHEN 1 TABLET: 7.5; 325 TABLET ORAL at 05:01

## 2020-01-28 RX ADMIN — HUMAN ALBUMIN MICROSPHERES AND PERFLUTREN 0.66 MG: 10; .22 INJECTION, SOLUTION INTRAVENOUS at 10:01

## 2020-01-28 RX ADMIN — DILTIAZEM HYDROCHLORIDE 5 MG/HR: 5 INJECTION INTRAVENOUS at 07:01

## 2020-01-28 NOTE — HPI
Ms. Alvarado is a 54 year old female with past medical history of HTN, MVP, depression, and history of opioid dependence. She presents to ED today due to complaints of one week progressively worsening shortness of breath and NAPIER. She reports inability to walk much farther than across the room before developing shortness of breath and this is associated with elevated heart rate. She denies orthopnea, PND, LE swelling, chest pain, light headiness, dizziness, fever, chills, recent sick contacts, or diaphoresis. She does report on January 9th, patient did have her upper teeth pulled for dentures and patient has been on ABX since that time. She reports mother and brother both with diagnosis of AFib also endorses significant family history of early onset CAD; mother with CABG and multiple stents ~ age 50 and sister with CAD and stents ~age 40. All past medical, social, and family history reviewed.     In the ED CBC stable, chemistry unremarkable, magnesium 1.7, , troponin negative, TSH WNL, CXR concerning for edema, and EKG with AFib and RVR. The patient was admitted to the Hospital Medicine Service for further evaluation and management.

## 2020-01-28 NOTE — SUBJECTIVE & OBJECTIVE
Past Medical History:   Diagnosis Date    Depression     Hx of psychiatric care     Psychiatric problem        No past surgical history on file.    Review of patient's allergies indicates:  No Known Allergies    No current facility-administered medications on file prior to encounter.      Current Outpatient Medications on File Prior to Encounter   Medication Sig    acetaminophen-codeine 300-30mg (TYLENOL #3) 300-30 mg Tab Take 1 tablet by mouth every 4 (four) hours as needed for pain.    amoxicillin (AMOXIL) 500 MG Tab Take 4 tablets (2,000 mg total) by mouth one hour before appointment    amoxicillin (AMOXIL) 500 MG Tab Take 1 tablet (500 mg total) by mouth 3 (three) times daily.    amoxicillin-clavulanate 875-125mg (AUGMENTIN) 875-125 mg per tablet take 1 tablet by mouth twice daily for 10 days    atenolol (TENORMIN) 50 MG tablet Take 50 mg by mouth 2 (two) times daily.    atenolol (TENORMIN) 50 MG tablet Take 1 tablet by mouth twice daily    atenolol (TENORMIN) 50 MG tablet take 1 tablet Twice a day By Mouth    atenolol (TENORMIN) 50 MG tablet Take 1 tablet by mouth Twice a day By Mouth 90 days    cyanocobalamin (VITAMIN B-12) 1000 MCG tablet Take 100 mcg by mouth once daily.    DULoxetine (CYMBALTA) 60 MG capsule Take 2 capsules (120 mg total) by mouth once daily.    gluc-shikha-[facility]#7-D-rarf-reymundo-bor 750 mg-644 mg- 30 mg-1 mg Tab     hydrochlorothiazide (HYDRODIURIL) 50 MG tablet Take 50 mg by mouth once daily.    hydroCHLOROthiazide (HYDRODIURIL) 50 MG tablet Take 1 tablet by mouth once daily in the morning    hydroCHLOROthiazide (HYDRODIURIL) 50 MG tablet take 1 Tablet by  mouth  Once a day    hydroCHLOROthiazide (HYDRODIURIL) 50 MG tablet Take 1 tablet (50 mg total) by mouth once daily.    ibuprofen (ADVIL,MOTRIN) 800 MG tablet     ibuprofen (ADVIL,MOTRIN) 800 MG tablet Take 1 tablet by mouth twice daily    ibuprofen (ADVIL,MOTRIN) 800 MG tablet take 1 Tablet by mouth  Twice a day     ibuprofen (ADVIL,MOTRIN) 800 MG tablet Take 1 tablet (800 mg total) by mouth 2 (two) times daily.    lisinopril (PRINIVIL,ZESTRIL) 40 MG tablet Take 40 mg by mouth once daily.    lisinopril (PRINIVIL,ZESTRIL) 40 MG tablet take 1 tablet by mouth  Once a day    lisinopril (PRINIVIL,ZESTRIL) 40 MG tablet Take 1 tablet by mouth Once a day Orally 90    multivitamin (THERAGRAN) per tablet Take 1 tablet by mouth once daily.    omega-3 fatty acids-vitamin E 1,000 mg Cap     omeprazole (PRILOSEC) 20 MG capsule Take 20 mg by mouth once daily.    omeprazole (PRILOSEC) 20 MG capsule take 1 capsule Once a day By Mouth    pneumococcal 23-dong ps vaccine (PNEUMOVAX 23) 25 mcg/0.5 mL Inject into the muscle.    traZODone (DESYREL) 100 MG tablet Take 1 tablet (100 mg total) by mouth nightly as needed for Insomnia.    varicella-zoster gE-AS01B, PF, (SHINGRIX) 50 mcg/0.5 mL injection Inject into the muscle.    varicella-zoster gE-AS01B, PF, (SHINGRIX, PF,) 50 mcg/0.5 mL injection Inject into the muscle.     Family History     Problem Relation (Age of Onset)    Cataracts Mother    Diabetes Father    Hypertension Mother, Father, Sister, Brother    Thyroid disease Mother        Tobacco Use    Smoking status: Current Every Day Smoker     Types: Cigarettes    Smokeless tobacco: Never Used   Substance and Sexual Activity    Alcohol use: No    Drug use: No    Sexual activity: Not on file     Review of Systems   Constitutional: Positive for activity change. Negative for appetite change, chills, diaphoresis, fatigue, fever and unexpected weight change.   HENT: Negative for congestion, sore throat and trouble swallowing.    Eyes: Negative.    Respiratory: Positive for shortness of breath. Negative for choking, chest tightness and wheezing.    Cardiovascular: Positive for palpitations. Negative for chest pain and leg swelling.   Gastrointestinal: Negative for abdominal distention, abdominal pain, constipation, diarrhea, nausea and  vomiting.   Endocrine: Negative.    Genitourinary: Negative for decreased urine volume, difficulty urinating, dysuria and urgency.   Musculoskeletal: Negative for arthralgias, back pain, gait problem, joint swelling and myalgias.   Skin: Negative for rash and wound.   Allergic/Immunologic: Negative.    Neurological: Negative for dizziness, syncope, weakness, light-headedness and headaches.   Hematological: Negative.    Psychiatric/Behavioral: Negative.      Objective:     Vital Signs (Most Recent):  Temp: 98.6 °F (37 °C) (01/28/20 1355)  Pulse: 77 (01/28/20 1500)  Resp: (!) 24 (01/28/20 1500)  BP: 139/67 (01/28/20 1500)  SpO2: 95 % (01/28/20 1330) Vital Signs (24h Range):  Temp:  [97.6 °F (36.4 °C)-98.6 °F (37 °C)] 98.6 °F (37 °C)  Pulse:  [] 77  Resp:  [18-25] 24  SpO2:  [91 %-98 %] 95 %  BP: (105-165)/() 139/67     Weight: (!) 140.6 kg (310 lb)  Body mass index is 51.59 kg/m².    Physical Exam   Constitutional: She is oriented to person, place, and time. She appears well-developed and well-nourished. No distress.   HENT:   Head: Normocephalic and atraumatic.   Mouth/Throat: Mucous membranes are normal. Normal dentition.   Eyes: Conjunctivae, EOM and lids are normal.   Neck: Normal range of motion. Neck supple. No JVD present.   Cardiovascular: Normal heart sounds. An irregularly irregular rhythm present. Tachycardia present.   No murmur heard.  Pulmonary/Chest: Effort normal and breath sounds normal. She exhibits no tenderness.   Abdominal: Soft. Bowel sounds are normal. She exhibits no distension. There is no tenderness.   Musculoskeletal: Normal range of motion. She exhibits no edema or deformity.   Neurological: She is alert and oriented to person, place, and time. No cranial nerve deficit.   Skin: Skin is warm and dry. No rash noted. She is not diaphoretic. No erythema.   Psychiatric: She has a normal mood and affect. Judgment and thought content normal.   Nursing note and vitals  reviewed.        CRANIAL NERVES     CN III, IV, VI   Extraocular motions are normal.     Significant Labs:   CBC:   Recent Labs   Lab 01/28/20  0531 01/28/20  0543   WBC 6.94  --    HGB 12.3  --    HCT 40.3 40     --      CMP:   Recent Labs   Lab 01/28/20  0531      K 4.0      CO2 21*   *   BUN 12   CREATININE 1.1   CALCIUM 9.7   PROT 7.7   ALBUMIN 4.0   BILITOT 0.9   ALKPHOS 91   AST 41*   ALT 22   ANIONGAP 13   EGFRNONAA 56.7*     Cardiac Markers:   Recent Labs   Lab 01/28/20  0531   *     Magnesium:   Recent Labs   Lab 01/28/20  0531   MG 1.7     Troponin:   Recent Labs   Lab 01/28/20  0531   TROPONINI 0.013     TSH:   Recent Labs   Lab 01/28/20  0531   TSH 1.225     All pertinent labs within the past 24 hours have been reviewed.    Significant Imaging: I have reviewed all pertinent imaging results/findings within the past 24 hours.

## 2020-01-28 NOTE — ASSESSMENT & PLAN NOTE
-admitted 1/28 due to Afib with RVR and new onset heart failure   -in the ED CBC stable, chemistry unremarkable, magnesium 1.7, , troponin negative, TSH WNL, CXR concerning for edema, and EKG with AFib and RVR  -new onset Afib etiology unknown; non toxic and TSH WNL, denies alcohol or opioid use  -rate control initiated in the ED with diltiazem gtt after poor response to IV metoprolol>>> wean as tolerated   -resume home atenolol   -CHADSVASc 2  -OAC with apixaban for now  -diuresis with IV lasix  -TTE pending  -consider REENA/DCCV in AM if remains in Afib  -continue tele monitoring  -EKG PRN arrhythmia

## 2020-01-28 NOTE — PLAN OF CARE
Plan of care reviewed with pt, verbalized understanding  Answered questions  Free from falls and injury  Afebrile  Afib on tele, with RVR  Dilt 10mg/hr  Will continue to monitor

## 2020-01-28 NOTE — ED PROVIDER NOTES
Encounter Date: 1/28/2020       History     Chief Complaint   Patient presents with    Shortness of Breath     SOB x 1 week, denies fever/cough, tonight with palpitations, CP and NAPIER     53 yo W with pmhx depression, HTN, mitral valve prolapse presents with a chief complaint of shortness of breath. Patient reports 1 week of progressively worsening shortness of breath.  Associated with dyspnea on exertion.  No orthopnea.  At this point, patient is only able to tolerate walking across the room prior to developing shortness of breath. Associated with rapid heart rate.  No history of atrial fibrillation.  No recent fevers, chills, recent illness.  On January 9th, patient did have her upper teeth pulled for dentures and patient has been on penicillin since that time.  She reports her tooth pain has improved.  No recent immobilization, travel, or history of VTE.  Patient's mother and brother both have atrial fibrillation.  Patient does take atenolol 10 mg q.d. for hypertension.        Review of patient's allergies indicates:  No Known Allergies  Past Medical History:   Diagnosis Date    Depression     Hx of psychiatric care     Psychiatric problem      No past surgical history on file.  Family History   Problem Relation Age of Onset    Cataracts Mother     Hypertension Mother     Thyroid disease Mother     Diabetes Father     Hypertension Father     Hypertension Sister     Hypertension Brother     Amblyopia Neg Hx     Blindness Neg Hx     Cancer Neg Hx     Glaucoma Neg Hx     Macular degeneration Neg Hx     Retinal detachment Neg Hx     Strabismus Neg Hx     Stroke Neg Hx      Social History     Tobacco Use    Smoking status: Current Every Day Smoker     Types: Cigarettes    Smokeless tobacco: Never Used   Substance Use Topics    Alcohol use: No    Drug use: No     Review of Systems   Constitutional: Positive for fatigue. Negative for fever and unexpected weight change.   HENT: Negative for  congestion.    Eyes: Negative for discharge.   Respiratory: Positive for shortness of breath. Negative for cough and wheezing.    Cardiovascular: Positive for palpitations. Negative for chest pain and leg swelling.   Gastrointestinal: Negative for abdominal pain.   Endocrine: Negative for polyuria.   Genitourinary: Negative for dysuria.   Musculoskeletal: Negative for back pain.   Skin: Negative for wound.   Allergic/Immunologic: Negative for immunocompromised state.   Neurological: Negative for headaches.   Hematological: Does not bruise/bleed easily.   Psychiatric/Behavioral: Negative for confusion.       Physical Exam     Initial Vitals [01/28/20 0457]   BP Pulse Resp Temp SpO2   (!) 165/103 (!) 121 18 97.6 °F (36.4 °C) 98 %      MAP       --         Physical Exam    Nursing note and vitals reviewed.  Constitutional: She appears well-developed and well-nourished. She is not diaphoretic. No distress.   HENT:   Head: Normocephalic and atraumatic.   Eyes: EOM are normal. Right eye exhibits no discharge. Left eye exhibits no discharge. No scleral icterus.   Neck: Normal range of motion. Neck supple. No thyromegaly (No thyroid tenderness) present. JVD (Trace) present.   Cardiovascular: Intact distal pulses. An irregularly irregular rhythm present.  Tachycardia present.  Exam reveals no gallop and no friction rub.    No murmur heard.  Pulmonary/Chest: Breath sounds normal. No respiratory distress. She has no wheezes. She has no rhonchi. She has no rales. She exhibits no tenderness.   Abdominal: Soft. Bowel sounds are normal. She exhibits no distension and no mass. There is no tenderness. There is no rebound and no guarding.   Musculoskeletal: Normal range of motion. She exhibits no edema or tenderness.   Neurological: She is alert and oriented to person, place, and time. She has normal strength. No sensory deficit.   Skin: Skin is warm and dry. Capillary refill takes less than 2 seconds.   Psychiatric:   Tearful          ED Course   Procedures  Labs Reviewed   CBC W/ AUTO DIFFERENTIAL - Abnormal; Notable for the following components:       Result Value    Mean Corpuscular Hemoglobin 26.1 (*)     Mean Corpuscular Hemoglobin Conc 30.5 (*)     RDW 15.6 (*)     Lymph% 16.1 (*)     All other components within normal limits   COMPREHENSIVE METABOLIC PANEL - Abnormal; Notable for the following components:    CO2 21 (*)     Glucose 173 (*)     AST 41 (*)     eGFR if non  56.7 (*)     All other components within normal limits   B-TYPE NATRIURETIC PEPTIDE - Abnormal; Notable for the following components:     (*)     All other components within normal limits   ISTAT PROCEDURE - Abnormal; Notable for the following components:    POC Glucose 179 (*)     POC Ionized Calcium 1.04 (*)     All other components within normal limits   MAGNESIUM   PROTIME-INR   TROPONIN I   TSH   ISTAT CHEM8     EKG Readings: (Independently Interpreted)   ST Segments: Normal ST Segments. T Waves: Normal.   Atrial fibrillation with a rapid ventricular response at a rate of 149 beats per minute       Imaging Results          X-Ray Chest AP Portable (Final result)  Result time 01/28/20 06:12:30    Final result by Dom Fuller MD (01/28/20 06:12:30)                 Impression:      Cardiomegaly with bilateral increased interstitial and parenchymal attenuation which may relate to interstitial edema.  Clinical correlation is advised.      Electronically signed by: Dom Fuller MD  Date:    01/28/2020  Time:    06:12             Narrative:    EXAMINATION:  XR CHEST AP PORTABLE    CLINICAL HISTORY:  Chest Pain;    TECHNIQUE:  Single frontal view of the chest was performed.    COMPARISON:  None    FINDINGS:  There is soft tissue attenuation relating to body habitus.  Cardiac monitoring leads overlie the chest.  Cardiomediastinal silhouette is enlarged.  There is prominence of the central pulmonary vasculature.  There is bilateral  increased interstitial and parenchymal attenuation.  No focal confluent airspace consolidation or pleural effusion appreciated.  No evidence of pneumothorax.  The visualized osseous structures appear intact.                                 Medical Decision Making:   History:   I obtained history from: someone other than patient.  Old Medical Records: I decided to obtain old medical records.  Initial Assessment:   53 yo W with pmhx depression, HTN, mitral valve prolapse presents with a chief complaint of 1 week of progressively worsening shortness of breath. She is found to be in new onset AFib with rapid ventricular response.  Differential Diagnosis:   New onset AFib, ACS, electrolyte abnormalities, thyroid issues, heart failure  Clinical Tests:   Lab Tests: Ordered  Radiological Study: Ordered  Medical Tests: Ordered  ED Management:  This does not appear to be consistent with acute PE.  Will obtain labs and chest x-ray.  Will administer IV fluids and push dose metoprolol 5 mg.    Reassessment:  No significant response with metoprolol 5 mg.  Heart rate in 130s.  Blood pressure stable. Will re-dose.    Reassessment:  No response to metoprolol 5 mg.  Will administer diltiazem.  CBC normal. I-STAT with mild hyperglycemia 179.    Reassessment: Pt rate controlled s/p diltiazem.  Repeat EKG obtained at 6:14 a.m. revealed atrial fibrillation at a rate of 102 beats per minute. Will initiate dilt gtt at 5.  CBC without leukocytosis or anemia.  CMP with hyperglycemia of 173.  BNP is elevated at 354.  Chest x-ray reveals mild fluid overload.  Overall I am suspicious for new onset atrial fibrillation secondary to new onset heart failure.  Will diurese with 40 mg of IV Lasix.  Will admit to Hospital Medicine.                Attending Attestation:         Attending Critical Care:   Critical Care Times:   Direct Patient Care (initial evaluation, reassessments, and time considering the  case)................................................................20 minutes.   Additional History from reviewing old medical records or taking additional history from the family, EMS, PCP, etc.......................5 minutes.   Ordering, Reviewing, and Interpreting Diagnostic Studies...............................................................................................................5 minutes.   Documentation..................................................................................................................................................................................5 minutes.   Consultation with other Physicians. .................................................................................................................................................5 minutes.   ==============================================================  · Total Critical Care Time - exclusive of procedural time: 40 minutes.  ==============================================================  Critical care was necessary to treat or prevent imminent or life-threatening deterioration of the following conditions: cardiac arrhythmia.                             Clinical Impression:       ICD-10-CM ICD-9-CM   1. Atrial fibrillation with RVR I48.91 427.31   2. Chest pain R07.9 786.50   3. New onset of congestive heart failure I50.9 428.0         Disposition:   Disposition: Admitted                     Francisco Jacobs MD  01/28/20 0623

## 2020-01-28 NOTE — ASSESSMENT & PLAN NOTE
-historically, sober for ~5 years  -her sister assists in sobriety and dispenses pain medications when warranted  -has been using PRN norco since dental extractions very judiciously  -continue inpatient as appropriate  -no additional narcotic/opioids to be administered

## 2020-01-28 NOTE — ED NOTES
Pt called out asking for water.  Pt was notified of the NPO status but was provided with a cup of water to swish and spit.  Pt states that she has new dentures and are uncomfortable when they are dry.

## 2020-01-28 NOTE — ED NOTES
Pt care assumed, pt up to use commode, became short of breath, heart rate increased. Pt back to bed on cardiac monitor, bp cuff and continuous pulse ox. Call light within reach. Will continue to monitor.

## 2020-01-28 NOTE — ASSESSMENT & PLAN NOTE
-chronic, appears controlled  -continue home atenolol and lisinopril   -holding home HCTZ while diuresing   -dose/medication adjustment as appropriate   -monitor

## 2020-01-28 NOTE — ASSESSMENT & PLAN NOTE
-see above  -TTE pending  -continue home atenolol and lisinopril   -holding home HCTZ while diuresing   -continue tele monitoring  -cardiac diet with fluid restriction  -strict I&Os and daily weights  -outpt follow up with Cardiology at [place]

## 2020-01-28 NOTE — ED NOTES
Pt updated on room status. Pt denies any concerns at this time. Pt continues on cardiac monitor, bp cuff and continuous pulse ox. Call light within reach, will continue to monitor.

## 2020-01-28 NOTE — NURSING TRANSFER
Nursing Transfer Note      1/28/2020     Transfer From: ED    Transfer via stretcher    Transfer with cardiac monitoring    Transported by transport    Medicines sent: Dilt drip    Chart send with patient: No    Notified: family    Patient reassessed at: arriival    Upon arrival to floor: cardiac monitor applied, patient oriented to room, call bell in reach and bed in lowest position

## 2020-01-28 NOTE — ED TRIAGE NOTES
Vicky GENTRY ArciniegaChristiano, a 54 y.o. female presents to the ED w/ complaint of SOB x 1 week and palpitations. Pt states that during ambulation she gets extremely short of breath when she exerts herself. Denies CP/abdominal pain, dysuria, or fever/chills. Denies hx of CHF or any new swelling in her lower extremities. Per patient family has extensive cardiac hx. AAOx4.    Triage note:  Chief Complaint   Patient presents with    Shortness of Breath     SOB x 1 week, denies fever/cough, tonight with palpitations, CP and NAPIER     Review of patient's allergies indicates:  No Known Allergies  Past Medical History:   Diagnosis Date    Depression     Hx of psychiatric care     Psychiatric problem

## 2020-01-29 ENCOUNTER — ANESTHESIA (OUTPATIENT)
Dept: MEDSURG UNIT | Facility: HOSPITAL | Age: 55
DRG: 308 | End: 2020-01-29
Payer: COMMERCIAL

## 2020-01-29 ENCOUNTER — ANESTHESIA EVENT (OUTPATIENT)
Dept: MEDSURG UNIT | Facility: HOSPITAL | Age: 55
DRG: 308 | End: 2020-01-29
Payer: COMMERCIAL

## 2020-01-29 PROBLEM — I50.23 ACUTE ON CHRONIC SYSTOLIC (CONGESTIVE) HEART FAILURE: Status: ACTIVE | Noted: 2020-01-28

## 2020-01-29 LAB
ALBUMIN SERPL BCP-MCNC: 4.2 G/DL (ref 3.5–5.2)
ALP SERPL-CCNC: 90 U/L (ref 55–135)
ALT SERPL W/O P-5'-P-CCNC: 24 U/L (ref 10–44)
ANION GAP SERPL CALC-SCNC: 16 MMOL/L (ref 8–16)
AST SERPL-CCNC: 53 U/L (ref 10–40)
BASOPHILS # BLD AUTO: 0.08 K/UL (ref 0–0.2)
BASOPHILS NFR BLD: 1 % (ref 0–1.9)
BILIRUB SERPL-MCNC: 0.9 MG/DL (ref 0.1–1)
BUN SERPL-MCNC: 18 MG/DL (ref 6–20)
CALCIUM SERPL-MCNC: 9.7 MG/DL (ref 8.7–10.5)
CHLORIDE SERPL-SCNC: 95 MMOL/L (ref 95–110)
CO2 SERPL-SCNC: 24 MMOL/L (ref 23–29)
CREAT SERPL-MCNC: 1 MG/DL (ref 0.5–1.4)
DIFFERENTIAL METHOD: ABNORMAL
EOSINOPHIL # BLD AUTO: 0.3 K/UL (ref 0–0.5)
EOSINOPHIL NFR BLD: 4.4 % (ref 0–8)
ERYTHROCYTE [DISTWIDTH] IN BLOOD BY AUTOMATED COUNT: 15.5 % (ref 11.5–14.5)
EST. GFR  (AFRICAN AMERICAN): >60 ML/MIN/1.73 M^2
EST. GFR  (NON AFRICAN AMERICAN): >60 ML/MIN/1.73 M^2
GLUCOSE SERPL-MCNC: 132 MG/DL (ref 70–110)
HCT VFR BLD AUTO: 38.8 % (ref 37–48.5)
HGB BLD-MCNC: 12.1 G/DL (ref 12–16)
IMM GRANULOCYTES # BLD AUTO: 0.04 K/UL (ref 0–0.04)
IMM GRANULOCYTES NFR BLD AUTO: 0.5 % (ref 0–0.5)
LYMPHOCYTES # BLD AUTO: 1.9 K/UL (ref 1–4.8)
LYMPHOCYTES NFR BLD: 24.7 % (ref 18–48)
MAGNESIUM SERPL-MCNC: 2.1 MG/DL (ref 1.6–2.6)
MCH RBC QN AUTO: 26 PG (ref 27–31)
MCHC RBC AUTO-ENTMCNC: 31.2 G/DL (ref 32–36)
MCV RBC AUTO: 83 FL (ref 82–98)
MONOCYTES # BLD AUTO: 0.9 K/UL (ref 0.3–1)
MONOCYTES NFR BLD: 11.2 % (ref 4–15)
NEUTROPHILS # BLD AUTO: 4.5 K/UL (ref 1.8–7.7)
NEUTROPHILS NFR BLD: 58.2 % (ref 38–73)
NRBC BLD-RTO: 0 /100 WBC
PLATELET # BLD AUTO: 238 K/UL (ref 150–350)
PMV BLD AUTO: 11.2 FL (ref 9.2–12.9)
POTASSIUM SERPL-SCNC: 3.6 MMOL/L (ref 3.5–5.1)
PROT SERPL-MCNC: 8 G/DL (ref 6–8.4)
RBC # BLD AUTO: 4.66 M/UL (ref 4–5.4)
SODIUM SERPL-SCNC: 135 MMOL/L (ref 136–145)
WBC # BLD AUTO: 7.68 K/UL (ref 3.9–12.7)

## 2020-01-29 PROCEDURE — 93010 ELECTROCARDIOGRAM REPORT: CPT | Mod: ,,, | Performed by: INTERNAL MEDICINE

## 2020-01-29 PROCEDURE — 93005 ELECTROCARDIOGRAM TRACING: CPT

## 2020-01-29 PROCEDURE — 92960 PR CARDIOVERSION, ELECTIVE;EXTERN: ICD-10-PCS | Mod: ,,, | Performed by: INTERNAL MEDICINE

## 2020-01-29 PROCEDURE — 25000003 PHARM REV CODE 250: Performed by: NURSE ANESTHETIST, CERTIFIED REGISTERED

## 2020-01-29 PROCEDURE — 63600175 PHARM REV CODE 636 W HCPCS: Performed by: NURSE PRACTITIONER

## 2020-01-29 PROCEDURE — D9220A PRA ANESTHESIA: Mod: CRNA,,, | Performed by: NURSE ANESTHETIST, CERTIFIED REGISTERED

## 2020-01-29 PROCEDURE — 36415 COLL VENOUS BLD VENIPUNCTURE: CPT

## 2020-01-29 PROCEDURE — 93010 EKG 12-LEAD: ICD-10-PCS | Mod: 76,,, | Performed by: INTERNAL MEDICINE

## 2020-01-29 PROCEDURE — 63600175 PHARM REV CODE 636 W HCPCS: Performed by: NURSE ANESTHETIST, CERTIFIED REGISTERED

## 2020-01-29 PROCEDURE — D9220A PRA ANESTHESIA: ICD-10-PCS | Mod: ANES,,, | Performed by: ANESTHESIOLOGY

## 2020-01-29 PROCEDURE — 83735 ASSAY OF MAGNESIUM: CPT

## 2020-01-29 PROCEDURE — 25000003 PHARM REV CODE 250: Performed by: INTERNAL MEDICINE

## 2020-01-29 PROCEDURE — 99232 PR SUBSEQUENT HOSPITAL CARE,LEVL II: ICD-10-PCS | Mod: ,,, | Performed by: NURSE PRACTITIONER

## 2020-01-29 PROCEDURE — 37000009 HC ANESTHESIA EA ADD 15 MINS: Performed by: INTERNAL MEDICINE

## 2020-01-29 PROCEDURE — 85025 COMPLETE CBC W/AUTO DIFF WBC: CPT

## 2020-01-29 PROCEDURE — 92960 CARDIOVERSION ELECTRIC EXT: CPT | Mod: ,,, | Performed by: INTERNAL MEDICINE

## 2020-01-29 PROCEDURE — 93010 ELECTROCARDIOGRAM REPORT: CPT | Mod: 76,,, | Performed by: INTERNAL MEDICINE

## 2020-01-29 PROCEDURE — D9220A PRA ANESTHESIA: Mod: ANES,,, | Performed by: ANESTHESIOLOGY

## 2020-01-29 PROCEDURE — D9220A PRA ANESTHESIA: ICD-10-PCS | Mod: CRNA,,, | Performed by: NURSE ANESTHETIST, CERTIFIED REGISTERED

## 2020-01-29 PROCEDURE — 37000008 HC ANESTHESIA 1ST 15 MINUTES: Performed by: INTERNAL MEDICINE

## 2020-01-29 PROCEDURE — 20600001 HC STEP DOWN PRIVATE ROOM

## 2020-01-29 PROCEDURE — 25000003 PHARM REV CODE 250: Performed by: NURSE PRACTITIONER

## 2020-01-29 PROCEDURE — 99232 SBSQ HOSP IP/OBS MODERATE 35: CPT | Mod: ,,, | Performed by: NURSE PRACTITIONER

## 2020-01-29 PROCEDURE — 80053 COMPREHEN METABOLIC PANEL: CPT

## 2020-01-29 PROCEDURE — 92960 CARDIOVERSION ELECTRIC EXT: CPT | Performed by: INTERNAL MEDICINE

## 2020-01-29 RX ORDER — METOPROLOL TARTRATE 25 MG/1
25 TABLET, FILM COATED ORAL 2 TIMES DAILY
Status: DISCONTINUED | OUTPATIENT
Start: 2020-01-29 | End: 2020-01-29

## 2020-01-29 RX ORDER — DIPHENHYDRAMINE HYDROCHLORIDE 50 MG/ML
25 INJECTION INTRAMUSCULAR; INTRAVENOUS EVERY 6 HOURS PRN
Status: DISCONTINUED | OUTPATIENT
Start: 2020-01-29 | End: 2020-01-30 | Stop reason: HOSPADM

## 2020-01-29 RX ORDER — PHENYLEPHRINE HYDROCHLORIDE 10 MG/ML
INJECTION INTRAVENOUS
Status: DISCONTINUED | OUTPATIENT
Start: 2020-01-29 | End: 2020-01-29

## 2020-01-29 RX ORDER — MIDAZOLAM HYDROCHLORIDE 1 MG/ML
INJECTION, SOLUTION INTRAMUSCULAR; INTRAVENOUS
Status: DISCONTINUED | OUTPATIENT
Start: 2020-01-29 | End: 2020-01-29

## 2020-01-29 RX ORDER — POTASSIUM CHLORIDE 750 MG/1
30 CAPSULE, EXTENDED RELEASE ORAL ONCE
Status: COMPLETED | OUTPATIENT
Start: 2020-01-29 | End: 2020-01-29

## 2020-01-29 RX ORDER — PROPOFOL 10 MG/ML
VIAL (ML) INTRAVENOUS CONTINUOUS PRN
Status: DISCONTINUED | OUTPATIENT
Start: 2020-01-29 | End: 2020-01-29

## 2020-01-29 RX ORDER — ONDANSETRON 2 MG/ML
4 INJECTION INTRAMUSCULAR; INTRAVENOUS ONCE AS NEEDED
Status: DISCONTINUED | OUTPATIENT
Start: 2020-01-29 | End: 2020-01-30

## 2020-01-29 RX ORDER — KETAMINE HCL IN 0.9 % NACL 50 MG/5 ML
SYRINGE (ML) INTRAVENOUS
Status: DISCONTINUED | OUTPATIENT
Start: 2020-01-29 | End: 2020-01-29

## 2020-01-29 RX ORDER — HYDROMORPHONE HYDROCHLORIDE 1 MG/ML
0.2 INJECTION, SOLUTION INTRAMUSCULAR; INTRAVENOUS; SUBCUTANEOUS EVERY 5 MIN PRN
Status: DISCONTINUED | OUTPATIENT
Start: 2020-01-29 | End: 2020-01-30

## 2020-01-29 RX ORDER — LIDOCAINE HYDROCHLORIDE 20 MG/ML
INJECTION INTRAVENOUS
Status: DISCONTINUED | OUTPATIENT
Start: 2020-01-29 | End: 2020-01-29

## 2020-01-29 RX ORDER — SILVER SULFADIAZINE 10 G/1000G
CREAM TOPICAL
Status: DISCONTINUED | OUTPATIENT
Start: 2020-01-29 | End: 2020-01-30 | Stop reason: HOSPADM

## 2020-01-29 RX ORDER — METOPROLOL TARTRATE 50 MG/1
50 TABLET ORAL 2 TIMES DAILY
Status: DISCONTINUED | OUTPATIENT
Start: 2020-01-29 | End: 2020-01-30 | Stop reason: HOSPADM

## 2020-01-29 RX ORDER — ACETAMINOPHEN, DIPHENHYDRAMINE HCL, PHENYLEPHRINE HCL 325; 25; 5 MG/1; MG/1; MG/1
1-2 TABLET ORAL NIGHTLY PRN
COMMUNITY
End: 2023-08-29 | Stop reason: CLARIF

## 2020-01-29 RX ORDER — FENTANYL CITRATE 50 UG/ML
25 INJECTION, SOLUTION INTRAMUSCULAR; INTRAVENOUS EVERY 5 MIN PRN
Status: DISCONTINUED | OUTPATIENT
Start: 2020-01-29 | End: 2020-01-30

## 2020-01-29 RX ADMIN — PROPOFOL 150 MCG/KG/MIN: 10 INJECTION, EMULSION INTRAVENOUS at 02:01

## 2020-01-29 RX ADMIN — PHENYLEPHRINE HYDROCHLORIDE 100 MCG: 10 INJECTION INTRAVENOUS at 02:01

## 2020-01-29 RX ADMIN — SENNOSIDES AND DOCUSATE SODIUM 1 TABLET: 8.6; 5 TABLET ORAL at 09:01

## 2020-01-29 RX ADMIN — MIDAZOLAM HYDROCHLORIDE 2 MG: 1 INJECTION, SOLUTION INTRAMUSCULAR; INTRAVENOUS at 02:01

## 2020-01-29 RX ADMIN — TRAZODONE HYDROCHLORIDE 100 MG: 50 TABLET ORAL at 11:01

## 2020-01-29 RX ADMIN — AMOXICILLIN 500 MG: 500 CAPSULE ORAL at 03:01

## 2020-01-29 RX ADMIN — LIDOCAINE HYDROCHLORIDE 50 MG: 20 INJECTION, SOLUTION INTRAVENOUS at 02:01

## 2020-01-29 RX ADMIN — HYDROCODONE BITARTRATE AND ACETAMINOPHEN 1 TABLET: 7.5; 325 TABLET ORAL at 04:01

## 2020-01-29 RX ADMIN — DULOXETINE HYDROCHLORIDE 120 MG: 60 CAPSULE, DELAYED RELEASE ORAL at 08:01

## 2020-01-29 RX ADMIN — Medication 10 MG: at 02:01

## 2020-01-29 RX ADMIN — METOPROLOL TARTRATE 50 MG: 50 TABLET ORAL at 12:01

## 2020-01-29 RX ADMIN — METOPROLOL TARTRATE 50 MG: 50 TABLET ORAL at 09:01

## 2020-01-29 RX ADMIN — SENNOSIDES AND DOCUSATE SODIUM 1 TABLET: 8.6; 5 TABLET ORAL at 08:01

## 2020-01-29 RX ADMIN — THERA TABS 1 TABLET: TAB at 08:01

## 2020-01-29 RX ADMIN — POTASSIUM CHLORIDE 30 MEQ: 750 CAPSULE, EXTENDED RELEASE ORAL at 08:01

## 2020-01-29 RX ADMIN — FUROSEMIDE 40 MG: 10 INJECTION, SOLUTION INTRAMUSCULAR; INTRAVENOUS at 09:01

## 2020-01-29 RX ADMIN — ATENOLOL 50 MG: 50 TABLET ORAL at 08:01

## 2020-01-29 RX ADMIN — AMOXICILLIN 500 MG: 500 CAPSULE ORAL at 08:01

## 2020-01-29 RX ADMIN — APIXABAN 5 MG: 5 TABLET, FILM COATED ORAL at 08:01

## 2020-01-29 RX ADMIN — Medication 1 CAPSULE: at 12:01

## 2020-01-29 RX ADMIN — AMOXICILLIN 500 MG: 500 CAPSULE ORAL at 09:01

## 2020-01-29 RX ADMIN — APIXABAN 5 MG: 5 TABLET, FILM COATED ORAL at 09:01

## 2020-01-29 RX ADMIN — SODIUM CHLORIDE, SODIUM GLUCONATE, SODIUM ACETATE, POTASSIUM CHLORIDE, MAGNESIUM CHLORIDE, SODIUM PHOSPHATE, DIBASIC, AND POTASSIUM PHOSPHATE: .53; .5; .37; .037; .03; .012; .00082 INJECTION, SOLUTION INTRAVENOUS at 02:01

## 2020-01-29 RX ADMIN — FUROSEMIDE 40 MG: 10 INJECTION, SOLUTION INTRAMUSCULAR; INTRAVENOUS at 08:01

## 2020-01-29 RX ADMIN — LISINOPRIL 40 MG: 20 TABLET ORAL at 08:01

## 2020-01-29 RX ADMIN — CYANOCOBALAMIN TAB 1000 MCG 1000 MCG: 1000 TAB at 08:01

## 2020-01-29 RX ADMIN — PANTOPRAZOLE SODIUM 40 MG: 40 TABLET, DELAYED RELEASE ORAL at 08:01

## 2020-01-29 NOTE — SUBJECTIVE & OBJECTIVE
Interval History: Resting on bedside, family in the room. She reports shortness of breath has improved however remains with palpitations. She understands ongoing diuresis and plans for REENA/DCCV. She was updated on plans to transition BBs. She denies chest pain or shortness of breath. Denies any acute events or distress overnight.     Review of Systems   Constitutional: Positive for activity change. Negative for appetite change, chills, diaphoresis, fatigue, fever and unexpected weight change.   HENT: Negative for congestion, sore throat and trouble swallowing.    Respiratory: Negative for choking, chest tightness, shortness of breath and wheezing.    Cardiovascular: Positive for palpitations. Negative for chest pain and leg swelling.   Gastrointestinal: Negative for abdominal distention, abdominal pain, constipation, diarrhea, nausea and vomiting.   Genitourinary: Negative for decreased urine volume, difficulty urinating, dysuria and urgency.   Musculoskeletal: Negative for arthralgias, back pain, gait problem, joint swelling and myalgias.   Skin: Negative for rash and wound.   Neurological: Negative for dizziness, syncope, weakness, light-headedness and headaches.     Objective:     Vital Signs (Most Recent):  Temp: 96.7 °F (35.9 °C) (01/29/20 0850)  Pulse: 83 (01/29/20 1108)  Resp: 16 (01/29/20 0850)  BP: 136/70 (01/29/20 0850)  SpO2: 96 % (01/29/20 0850) Vital Signs (24h Range):  Temp:  [96.7 °F (35.9 °C)-98.6 °F (37 °C)] 96.7 °F (35.9 °C)  Pulse:  [] 83  Resp:  [16-24] 16  SpO2:  [91 %-99 %] 96 %  BP: (114-149)/(65-99) 136/70     Weight: 133.6 kg (294 lb 8.6 oz)  Body mass index is 49.01 kg/m².    Intake/Output Summary (Last 24 hours) at 1/29/2020 1200  Last data filed at 1/29/2020 0900  Gross per 24 hour   Intake 1260 ml   Output 2800 ml   Net -1540 ml      Physical Exam   Constitutional: She is oriented to person, place, and time. She appears well-developed and well-nourished. No distress.   HENT:    Head: Normocephalic and atraumatic.   Mouth/Throat: Mucous membranes are normal. Normal dentition.   Eyes: Conjunctivae, EOM and lids are normal.   Neck: Normal range of motion. Neck supple. No JVD present.   Cardiovascular: Normal rate and normal heart sounds. An irregularly irregular rhythm present.   No murmur heard.  Pulmonary/Chest: Effort normal and breath sounds normal. She exhibits no tenderness.   Abdominal: Soft. Bowel sounds are normal. She exhibits no distension. There is no tenderness.   Musculoskeletal: Normal range of motion. She exhibits no edema or deformity.   Neurological: She is alert and oriented to person, place, and time. No cranial nerve deficit.   Skin: Skin is warm and dry. No rash noted. She is not diaphoretic. No erythema.   Psychiatric: She has a normal mood and affect. Judgment and thought content normal.   Nursing note and vitals reviewed.    Significant Labs:   CBC:   Recent Labs   Lab 01/28/20  0531 01/28/20  0543 01/29/20  0544   WBC 6.94  --  7.68   HGB 12.3  --  12.1   HCT 40.3 40 38.8     --  238     CMP:   Recent Labs   Lab 01/28/20  0531 01/29/20  0544    135*   K 4.0 3.6    95   CO2 21* 24   * 132*   BUN 12 18   CREATININE 1.1 1.0   CALCIUM 9.7 9.7   PROT 7.7 8.0   ALBUMIN 4.0 4.2   BILITOT 0.9 0.9   ALKPHOS 91 90   AST 41* 53*   ALT 22 24   ANIONGAP 13 16   EGFRNONAA 56.7* >60.0     Magnesium:   Recent Labs   Lab 01/28/20  0531 01/29/20  0544   MG 1.7 2.1     All pertinent labs within the past 24 hours have been reviewed.    Significant Imaging: I have reviewed all pertinent imaging results/findings within the past 24 hours.

## 2020-01-29 NOTE — NURSING TRANSFER
Nursing Transfer Note      1/29/2020     Transfer To: 345    Transfer via stretcher    Transfer with cardiac monitoring    Transported by angela rn    Medicines sent: silvadene cream    Chart send with patient: Yes    Notified: reported to floor nurse also called telmetry and let them know patient going back to her room    Patient reassessed at: see epic (date, time)    Upon arrival to floor: reported to floor nurse no complaints no distress noted.

## 2020-01-29 NOTE — ASSESSMENT & PLAN NOTE
1. REENA for evaluation of EMILIANO prior to DCCV.  -No absolute contraindications of esophageal stricture, tumor, perforation, laceration,or diverticulum and/or active GI bleed  -The risks, benefits & alternatives of the procedure were explained to the patient.   -The risks of transesophageal echo include but are not limited to:  Dental trauma, esophageal trauma/perforation, bleeding, laryngospasm/brochospasm, aspiration, sore throat/hoarseness, & dislodgement of the endotracheal tube/nasogastric tube (where applicable).    -The risks of moderate sedation include hypotension, respiratory depression, arrhythmias, bronchospasm, & death.    -Informed consent was obtained. The patient is agreeable to proceed with the procedure and all questions and concerns addressed.    Case discussed with an attending in echocardiography lab.     Further recommendations per attending addendum

## 2020-01-29 NOTE — PLAN OF CARE
EP Note Update    Pt is s/p REENA/DCCV and is now in NSR. She can follow up with Dr. Hawley in 1 month in EP clinic. EP team will sign off. Thank you.     Eli Elkins, PGY-5 (Cardiology Fellow)

## 2020-01-29 NOTE — ASSESSMENT & PLAN NOTE
-admitted 1/28 due to new onset Afib and CHF  -in the ED CBC stable, chemistry unremarkable, magnesium 1.7, , troponin negative, TSH WNL, CXR concerning for edema, and EKG with AFib and RVR  -new onset Afib, no reversible etiology seen; non toxic and TSH WNL, denies alcohol or opioid   -TTE obtained with EF 40%, local segmental WMAs, moderate LAE, normal RV systolic function, moderate MARINE, mild AVS (reduced LENCHO but valve opening appears near normal), LENCHO 1.67 cm2; peak velocity is 1.77 m/s; mean gradient is 8 mmHg, mild MR, mild TR, PASP 29, and CVP 3  -diuresis was initiated with IVP lasix  -currently net negative 1.8 liters and weight down ~ 4 lbs  -continue GDMT with lisinopril and lopressor (home atenolol discontinued)  -CHADSVASc 3  -home atenolol transitioned to lopressor and diltiazem gtt being weaned  -currently rate controlled  -EP consulted, plans for REENA/DCCV today  -continue OAC with apixaban  -continue tele monitoring  -EKG PRN arrhythmia   -will need outpt PET stress for ischemic evaluation and follow up with Cardiology

## 2020-01-29 NOTE — TRANSFER OF CARE
"Anesthesia Transfer of Care Note    Patient: Vicky Alvarado    Procedure(s) Performed: Procedure(s) (LRB):  CARDIOVERSION (N/A)  ECHOCARDIOGRAM,TRANSESOPHAGEAL (N/A)    Patient location: PACU    Anesthesia Type: general    Transport from OR: Transported from OR on 2-3 L/min O2 by NC with adequate spontaneous ventilation    Post pain: adequate analgesia    Post assessment: no apparent anesthetic complications    Post vital signs: stable    Level of consciousness: awake, alert and oriented    Nausea/Vomiting: no nausea/vomiting    Complications: none    Transfer of care protocol was followed      Last vitals:   Visit Vitals  BP (!) 95/50   Pulse 69   Temp 36.8 °C (98.3 °F)   Resp 16   Ht 5' 5" (1.651 m)   Wt 133.4 kg (294 lb)   LMP 06/08/2015   SpO2 (!) 94%   Breastfeeding? No   BMI 48.92 kg/m²     "

## 2020-01-29 NOTE — PLAN OF CARE
Pt free of falls/trauma/injuries.  Denies c/o SOB, CP, or discomfort.  Generalized skin remains CDI; generalized edema noted.  Pt being diuresed with lasix 400 mg bid ; diuresing well.Pt currently on a diltiazem drip tolerating well   she also is receiving abt therapy for dental procedure. Electrolytes replaced as ordered.Pt will be NPO aftermidnight.  Pt tolerating plan of care.

## 2020-01-29 NOTE — PLAN OF CARE
Pt remains free from falls and injuries. VSS. Pt cardioverted to NSR/SB. HR 50-60s. Pt fully independent. No complaints at this time. Still diuresing and strict I&Os. POC discussed.

## 2020-01-29 NOTE — ASSESSMENT & PLAN NOTE
-chronic, appears controlled  -continue lopressor and lisinopril   -holding home HCTZ while diuresing   -dose/medication adjustment as appropriate   -monitor

## 2020-01-29 NOTE — CONSULTS
[facility]  Cardiac Electrophysiology  Consult Note    Admission Date: 1/28/2020  Code Status: Full Code   Attending Provider: Clair Rayo MD  Consulting Provider: Eli Elkins MD  Principal Problem:Atrial fibrillation with RVR    Inpatient consult to Electrophysiology  Consult performed by: Eli Elkins MD  Consult ordered by: Eli Elkins MD  Reason for consult: afib        Subjective:     Chief Complaint:  Shortness of Breath     HPI: 54 year old female with history of depression, HTN and mitral valve prolapse presents with 1 week of progressively worsening shortness of breath and palpitations. Of note, on 1/9/20, she had her upper teeth pulled for dentures and been on penicillin since that time. Regarding her family history, her mother and brother both have atrial fibrillation. She takes atenolol 10 mg q.d. for hypertension.     Initial EKG: atrial fibrillation with RVR and on subsequent ones; Review of telemetry shows atrial fibrillation as well.    The admitting team started her on diltiazem drip at 5 mg/hr and on Eliquis 5 mg BID.     Labs show K 3.6, Mg 1.7, TSH within normal limits, troponin x1 negative and 's.     CXR show interstitial pulmonary edema    TTE (January 2020):   · Atrial fibrillation observed.  · Left ventricular systolic function. The estimated ejection fraction is 40%.  · Local segmental wall motion abnormalities.  · Moderate left atrial enlargement.  · Normal right ventricular systolic function.  · Moderate right atrial enlargement.  · Possibly mild aortic valve stenosis ( reduced LENCHO but valve opening appears near normal).  · Aortic valve area is 1.67 cm2; peak velocity is 1.77 m/s; mean gradient is 8 mmHg.  · Mild mitral regurgitation.  · Mild tricuspid regurgitation.  · Normal central venous pressure (3 mmHg).  · The estimated PA systolic pressure is 29 mmHg.    EP is consulted for assistance with management of atrial fibrillation.     Past  Medical History:   Diagnosis Date    Depression     Hx of psychiatric care     Psychiatric problem        History reviewed. No pertinent surgical history.    Review of patient's allergies indicates:  No Known Allergies    No current facility-administered medications on file prior to encounter.      Current Outpatient Medications on File Prior to Encounter   Medication Sig    amoxicillin (AMOXIL) 500 MG Tab Take 1 tablet (500 mg total) by mouth 3 (three) times daily.    atenolol (TENORMIN) 50 MG tablet Take 1 tablet by mouth Twice a day By Mouth 90 days    cyanocobalamin (VITAMIN B-12) 1000 MCG tablet Take 100 mcg by mouth once daily.     DULoxetine (CYMBALTA) 60 MG capsule Take 2 capsules (120 mg total) by mouth once daily.    gluc-shikha-[facility]#1-S-zbvv-reymundo-bor 750 mg-644 mg- 30 mg-1 mg Tab Take 1 tablet by mouth once daily.     hydroCHLOROthiazide (HYDRODIURIL) 50 MG tablet take 1 Tablet by  mouth  Once a day    ibuprofen (ADVIL,MOTRIN) 800 MG tablet take 1 Tablet by mouth  Twice a day    lisinopril (PRINIVIL,ZESTRIL) 40 MG tablet Take 1 tablet by mouth Once a day Orally 90    MELATONIN ORAL Take 1-2 tablets by mouth nightly as needed (Sleep).    multivitamin (THERAGRAN) per tablet Take 1 tablet by mouth once daily.    omega-3 fatty acids-vitamin E 1,000 mg Cap     omeprazole (PRILOSEC) 20 MG capsule take 1 capsule Once a day By Mouth    traZODone (DESYREL) 100 MG tablet Take 1 tablet (100 mg total) by mouth nightly as needed for Insomnia.    pneumococcal 23-dong ps vaccine (PNEUMOVAX 23) 25 mcg/0.5 mL Inject into the muscle.    varicella-zoster gE-AS01B, PF, (SHINGRIX) 50 mcg/0.5 mL injection Inject into the muscle.    varicella-zoster gE-AS01B, PF, (SHINGRIX, PF,) 50 mcg/0.5 mL injection Inject into the muscle.    [DISCONTINUED] acetaminophen-codeine 300-30mg (TYLENOL #3) 300-30 mg Tab Take 1 tablet by mouth every 4 (four) hours as needed for pain.    [DISCONTINUED] amoxicillin (AMOXIL) 500 MG Tab  Take 4 tablets (2,000 mg total) by mouth one hour before appointment    [DISCONTINUED] amoxicillin-clavulanate 875-125mg (AUGMENTIN) 875-125 mg per tablet take 1 tablet by mouth twice daily for 10 days    [DISCONTINUED] atenolol (TENORMIN) 50 MG tablet Take 50 mg by mouth 2 (two) times daily.    [DISCONTINUED] atenolol (TENORMIN) 50 MG tablet Take 1 tablet by mouth twice daily    [DISCONTINUED] atenolol (TENORMIN) 50 MG tablet take 1 tablet Twice a day By Mouth    [DISCONTINUED] hydrochlorothiazide (HYDRODIURIL) 50 MG tablet Take 50 mg by mouth once daily.    [DISCONTINUED] hydroCHLOROthiazide (HYDRODIURIL) 50 MG tablet Take 1 tablet by mouth once daily in the morning    [DISCONTINUED] hydroCHLOROthiazide (HYDRODIURIL) 50 MG tablet Take 1 tablet (50 mg total) by mouth once daily.    [DISCONTINUED] ibuprofen (ADVIL,MOTRIN) 800 MG tablet     [DISCONTINUED] ibuprofen (ADVIL,MOTRIN) 800 MG tablet Take 1 tablet by mouth twice daily    [DISCONTINUED] ibuprofen (ADVIL,MOTRIN) 800 MG tablet Take 1 tablet (800 mg total) by mouth 2 (two) times daily.    [DISCONTINUED] lisinopril (PRINIVIL,ZESTRIL) 40 MG tablet Take 40 mg by mouth once daily.    [DISCONTINUED] lisinopril (PRINIVIL,ZESTRIL) 40 MG tablet take 1 tablet by mouth  Once a day    [DISCONTINUED] omeprazole (PRILOSEC) 20 MG capsule Take 20 mg by mouth once daily.     Family History     Problem Relation (Age of Onset)    Cataracts Mother    Diabetes Father    Hypertension Mother, Father, Sister, Brother    Thyroid disease Mother        Tobacco Use    Smoking status: Current Every Day Smoker     Types: Cigarettes    Smokeless tobacco: Never Used   Substance and Sexual Activity    Alcohol use: No    Drug use: No    Sexual activity: Not on file     Review of Systems   Constitution: Negative for chills and fever.   HENT: Negative for congestion.    Eyes: Negative for blurred vision.   Cardiovascular: Positive for dyspnea on exertion, irregular heartbeat  and palpitations. Negative for chest pain, near-syncope, paroxysmal nocturnal dyspnea and syncope.   Respiratory: Positive for shortness of breath. Negative for sputum production and wheezing.    Gastrointestinal: Negative for abdominal pain, nausea and vomiting.   Genitourinary: Negative for dysuria.   Neurological: Negative for dizziness, focal weakness, headaches, light-headedness and weakness.     Objective:     Vital Signs (Most Recent):  Temp: 96.7 °F (35.9 °C) (01/29/20 0850)  Pulse: 83 (01/29/20 1108)  Resp: 16 (01/29/20 0850)  BP: 136/70 (01/29/20 0850)  SpO2: 96 % (01/29/20 0850) Vital Signs (24h Range):  Temp:  [96.7 °F (35.9 °C)-98.6 °F (37 °C)] 96.7 °F (35.9 °C)  Pulse:  [] 83  Resp:  [16-24] 16  SpO2:  [91 %-99 %] 96 %  BP: (114-149)/(65-99) 136/70       Weight: 133.6 kg (294 lb 8.6 oz)  Body mass index is 49.01 kg/m².    SpO2: 96 %  O2 Device (Oxygen Therapy): room air    Physical Exam   Constitutional: She is oriented to person, place, and time. She appears well-developed and well-nourished. No distress.   HENT:   Head: Normocephalic and atraumatic.   Mouth/Throat: Oropharynx is clear and moist.   Eyes: Pupils are equal, round, and reactive to light.   Neck: Neck supple.   Cardiovascular: Normal rate and intact distal pulses.   Irregularly irregular    Pulmonary/Chest: Effort normal.   Abdominal: Soft. She exhibits no distension.   Neurological: She is alert and oriented to person, place, and time.   Skin: Skin is warm. She is not diaphoretic.   Psychiatric: She has a normal mood and affect. Her behavior is normal.   Vitals reviewed.      Significant Labs: All pertinent lab results from the last 24 hours have been reviewed.    Significant Imaging: Echocardiogram: 2D echo with color flow doppler: No results found for this or any previous visit.       CHADS2 for A-FIB Stroke Risk  Age?: < 65 years old  CHF history: Yes  HTN history: Yes  Previous Stroke Sx or TIA: No  Vascular Disease History?:  No  Diabetes Mellitus History: No  Female?: Yes  [id number] Total Score: 3      Assessment and Plan:     * Atrial fibrillation   -new diagnosis, patient risk factors include HTN, female sex and family history     EP Plan:   -pt has CHADS-VASC score of 3, c/w Eliquis 5 mg BID for stroke prevention   -plan for REENA/DCCV today (REENA needed as unclear of afib onset and was not on AC prior to this hospitalization)  -given heart failure, would avoid calcium channel blockers and discontinue home atenolol and use metoprolol succinate 25 mg BID with up-titration as tolerated  -check electrolytes (replace as needed- goal potassium >4 and magnesium >2)   -monitor on telemetry     Plan to be discussed with EP attending.     Thank you for your consult.    Eli Elkins MD  Cardiac Electrophysiology  [facility]

## 2020-01-29 NOTE — CONSULTS
[facility]  Cardiology  Consult Note    Patient Name: Vicky Alvarado  MRN: [id number]  Admission Date: 1/28/2020  Hospital Length of Stay: 1 days  Code Status: Full Code   Attending Provider: Clair Rayo MD   Consulting Provider: Lorna Eisenberg MD  Primary Care Physician: Gordy Cordero MD  Principal Problem:Atrial fibrillation with RVR    Patient information was obtained from patient and ER records.     Consults  Subjective:     HPI: Vicky Alvarado is a 55 yo F with PMHx MVP, HTN, depression, hx opioid dependence who is admitted on 1/28/20 with SOB / NAPIER / palpitations found to be in AF with RVR. Patient started on rate controlling agents + Eliquis for CVA ppx. Team requesting REENA/DCCV due to persistence of AF.      Dysphagia or odynophagia:  No  Liver Disease, esophageal disease, or known varices:  No  Upper GI Bleeding: No  Snoring:  Yes  Sleep Apnea:  No  Prior neck surgery or radiation:  No  History of anesthetic difficulties:  No  Family history of anesthetic difficulties:  No  Last oral intake:  12 hours ago  Able to move neck in all directions:  Yes        TTE 1.28.20  · Atrial fibrillation observed.  · Left ventricular systolic function. The estimated ejection fraction is 40%.  · Local segmental wall motion abnormalities.  · Moderate left atrial enlargement.  · Normal right ventricular systolic function.  · Moderate right atrial enlargement.  · Possibly mild aortic valve stenosis ( reduced LENCHO but valve opening appears near normal).  · Aortic valve area is 1.67 cm2; peak velocity is 1.77 m/s; mean gradient is 8 mmHg.  · Mild mitral regurgitation.  · Mild tricuspid regurgitation.  · Normal central venous pressure (3 mmHg).  · The estimated PA systolic pressure is 29 mmHg.      Past Medical History:   Diagnosis Date    Depression     Hx of psychiatric care     Psychiatric problem        History reviewed. No pertinent surgical history.    Review of patient's allergies  indicates:  No Known Allergies    No current facility-administered medications on file prior to encounter.      Current Outpatient Medications on File Prior to Encounter   Medication Sig    amoxicillin (AMOXIL) 500 MG Tab Take 1 tablet (500 mg total) by mouth 3 (three) times daily.    atenolol (TENORMIN) 50 MG tablet Take 1 tablet by mouth Twice a day By Mouth 90 days    cyanocobalamin (VITAMIN B-12) 1000 MCG tablet Take 100 mcg by mouth once daily.     DULoxetine (CYMBALTA) 60 MG capsule Take 2 capsules (120 mg total) by mouth once daily.    gluc-shikha-[facility]#0-C-vsdp-reymundo-bor 750 mg-644 mg- 30 mg-1 mg Tab Take 1 tablet by mouth once daily.     hydroCHLOROthiazide (HYDRODIURIL) 50 MG tablet take 1 Tablet by  mouth  Once a day    ibuprofen (ADVIL,MOTRIN) 800 MG tablet take 1 Tablet by mouth  Twice a day    lisinopril (PRINIVIL,ZESTRIL) 40 MG tablet Take 1 tablet by mouth Once a day Orally 90    MELATONIN ORAL Take 1-2 tablets by mouth nightly as needed (Sleep).    multivitamin (THERAGRAN) per tablet Take 1 tablet by mouth once daily.    omega-3 fatty acids-vitamin E 1,000 mg Cap     omeprazole (PRILOSEC) 20 MG capsule take 1 capsule Once a day By Mouth    traZODone (DESYREL) 100 MG tablet Take 1 tablet (100 mg total) by mouth nightly as needed for Insomnia.    pneumococcal 23-dong ps vaccine (PNEUMOVAX 23) 25 mcg/0.5 mL Inject into the muscle.    varicella-zoster gE-AS01B, PF, (SHINGRIX) 50 mcg/0.5 mL injection Inject into the muscle.    varicella-zoster gE-AS01B, PF, (SHINGRIX, PF,) 50 mcg/0.5 mL injection Inject into the muscle.    [DISCONTINUED] acetaminophen-codeine 300-30mg (TYLENOL #3) 300-30 mg Tab Take 1 tablet by mouth every 4 (four) hours as needed for pain.    [DISCONTINUED] amoxicillin (AMOXIL) 500 MG Tab Take 4 tablets (2,000 mg total) by mouth one hour before appointment    [DISCONTINUED] amoxicillin-clavulanate 875-125mg (AUGMENTIN) 875-125 mg per tablet take 1 tablet by mouth twice daily  for 10 days    [DISCONTINUED] atenolol (TENORMIN) 50 MG tablet Take 50 mg by mouth 2 (two) times daily.    [DISCONTINUED] atenolol (TENORMIN) 50 MG tablet Take 1 tablet by mouth twice daily    [DISCONTINUED] atenolol (TENORMIN) 50 MG tablet take 1 tablet Twice a day By Mouth    [DISCONTINUED] hydrochlorothiazide (HYDRODIURIL) 50 MG tablet Take 50 mg by mouth once daily.    [DISCONTINUED] hydroCHLOROthiazide (HYDRODIURIL) 50 MG tablet Take 1 tablet by mouth once daily in the morning    [DISCONTINUED] hydroCHLOROthiazide (HYDRODIURIL) 50 MG tablet Take 1 tablet (50 mg total) by mouth once daily.    [DISCONTINUED] ibuprofen (ADVIL,MOTRIN) 800 MG tablet     [DISCONTINUED] ibuprofen (ADVIL,MOTRIN) 800 MG tablet Take 1 tablet by mouth twice daily    [DISCONTINUED] ibuprofen (ADVIL,MOTRIN) 800 MG tablet Take 1 tablet (800 mg total) by mouth 2 (two) times daily.    [DISCONTINUED] lisinopril (PRINIVIL,ZESTRIL) 40 MG tablet Take 40 mg by mouth once daily.    [DISCONTINUED] lisinopril (PRINIVIL,ZESTRIL) 40 MG tablet take 1 tablet by mouth  Once a day    [DISCONTINUED] omeprazole (PRILOSEC) 20 MG capsule Take 20 mg by mouth once daily.     Family History     Problem Relation (Age of Onset)    Cataracts Mother    Diabetes Father    Hypertension Mother, Father, Sister, Brother    Thyroid disease Mother        Tobacco Use    Smoking status: Current Every Day Smoker     Types: Cigarettes    Smokeless tobacco: Never Used   Substance and Sexual Activity    Alcohol use: No    Drug use: No    Sexual activity: Not on file     Review of Systems   Constitution: Negative for chills, decreased appetite, diaphoresis, fever, malaise/fatigue, night sweats, weight gain and weight loss.   Eyes: Negative.    Cardiovascular: Negative for chest pain, irregular heartbeat, leg swelling, near-syncope, orthopnea, palpitations, paroxysmal nocturnal dyspnea and syncope.   Respiratory: Negative for cough, shortness of breath, sputum  production and wheezing.    Endocrine: Negative.    Hematologic/Lymphatic: Negative for bleeding problem.   Skin: Negative for color change, flushing, rash and suspicious lesions.   Musculoskeletal: Negative.    Gastrointestinal: Negative for bloating, abdominal pain, change in bowel habit, constipation, diarrhea, heartburn, melena, nausea and vomiting.   Genitourinary: Negative for flank pain, frequency, hematuria, incomplete emptying and urgency.   Neurological: Negative for dizziness, focal weakness, headaches, light-headedness, numbness, paresthesias, seizures, sensory change and weakness.   Psychiatric/Behavioral: Negative for altered mental status. The patient is not nervous/anxious.      Objective:     Vital Signs (Most Recent):  Temp: 96.7 °F (35.9 °C) (01/29/20 0850)  Pulse: 83 (01/29/20 1108)  Resp: 16 (01/29/20 0850)  BP: 136/70 (01/29/20 0850)  SpO2: 96 % (01/29/20 0850) Vital Signs (24h Range):  Temp:  [96.7 °F (35.9 °C)-98.6 °F (37 °C)] 96.7 °F (35.9 °C)  Pulse:  [] 83  Resp:  [16-24] 16  SpO2:  [91 %-99 %] 96 %  BP: (114-149)/(65-99) 136/70     Weight: 133.6 kg (294 lb 8.6 oz)  Body mass index is 49.01 kg/m².    SpO2: 96 %  O2 Device (Oxygen Therapy): room air      Intake/Output Summary (Last 24 hours) at 1/29/2020 1129  Last data filed at 1/29/2020 0900  Gross per 24 hour   Intake 1260 ml   Output 2800 ml   Net -1540 ml       Lines/Drains/Airways     Peripheral Intravenous Line                 Peripheral IV - Single Lumen 01/28/20 0519 20 G Right Antecubital 1 day                Physical Exam   Constitutional: She is oriented to person, place, and time. She appears well-developed and well-nourished. No distress.   HENT:   Head: Normocephalic and atraumatic.   Mouth/Throat: Oropharynx is clear and moist.   Eyes: Pupils are equal, round, and reactive to light. EOM are normal.   Neck: Normal range of motion. Neck supple. No JVD present.   Cardiovascular: Normal rate. An irregularly irregular  rhythm present. Exam reveals no gallop and no friction rub.   No murmur heard.  Pulmonary/Chest: Effort normal and breath sounds normal. No respiratory distress. She has no wheezes. She has no rales. She exhibits no tenderness.   Abdominal: Soft. Bowel sounds are normal. She exhibits no distension. There is no tenderness.   Musculoskeletal: Normal range of motion. She exhibits no edema.   Lymphadenopathy:     She has no cervical adenopathy.   Neurological: She is alert and oriented to person, place, and time.   Skin: Skin is warm and dry. No erythema.   Psychiatric: She has a normal mood and affect. Her behavior is normal. Judgment and thought content normal.       Significant Labs: Reviewed.    Significant Imaging: Reviewed.    Assessment and Plan:     * Atrial fibrillation with RVR  1. REENA for evaluation of EMILIANO prior to DCCV.  -No absolute contraindications of esophageal stricture, tumor, perforation, laceration,or diverticulum and/or active GI bleed  -The risks, benefits & alternatives of the procedure were explained to the patient.   -The risks of transesophageal echo include but are not limited to:  Dental trauma, esophageal trauma/perforation, bleeding, laryngospasm/brochospasm, aspiration, sore throat/hoarseness, & dislodgement of the endotracheal tube/nasogastric tube (where applicable).    -The risks of moderate sedation include hypotension, respiratory depression, arrhythmias, bronchospasm, & death.    -Informed consent was obtained. The patient is agreeable to proceed with the procedure and all questions and concerns addressed.    Case discussed with an attending in echocardiography lab.     Further recommendations per attending addendum          Lorna Eisenberg MD  Cardiology   [facility]

## 2020-01-29 NOTE — HPI
Vicky Alvarado is a 55 yo F with PMHx MVP, HTN, depression, hx opioid dependence who is admitted on 1/28/20 with SOB / NAPIER / palpitations found to be in AF with RVR. Patient started on rate controlling agents + Eliquis for CVA ppx. Team requesting REENA/DCCV due to persistence of AF.    TTE 1.28.20  · Atrial fibrillation observed.  · Left ventricular systolic function. The estimated ejection fraction is 40%.  · Local segmental wall motion abnormalities.  · Moderate left atrial enlargement.  · Normal right ventricular systolic function.  · Moderate right atrial enlargement.  · Possibly mild aortic valve stenosis ( reduced LENCHO but valve opening appears near normal).  · Aortic valve area is 1.67 cm2; peak velocity is 1.77 m/s; mean gradient is 8 mmHg.  · Mild mitral regurgitation.  · Mild tricuspid regurgitation.  · Normal central venous pressure (3 mmHg).  · The estimated PA systolic pressure is 29 mmHg.

## 2020-01-29 NOTE — ANESTHESIA PREPROCEDURE EVALUATION
01/29/2020  Vicky Alvarado is a 54 y.o., female   Patient Active Problem List   Diagnosis    Opiate dependence    Opioid dependence in remission    Atrial fibrillation with RVR    Essential hypertension    New onset of congestive heart failure     .    Anesthesia Evaluation         Review of Systems         Anesthesia Plan  Type of Anesthesia, risks & benefits discussed:  Anesthesia Type:  general  Patient's Preference: General  Intra-op Monitoring Plan: standard ASA monitors  Intra-op Monitoring Plan Comments: Standard ASA monitors.   Post Op Pain Control Plan: per primary service following discharge from PACU  Post Op Pain Control Plan Comments: Per primary service.     Induction:   IV  Beta Blocker:  Patient is not currently on a Beta-Blocker (No further documentation required).       Informed Consent: Patient understands risks and agrees with Anesthesia plan.  Questions answered. Anesthesia consent signed with patient.  ASA Score: 3     Day of Surgery Review of History & Physical:    H&P update referred to the surgeon.     Anesthesia Plan Notes: Chart reviewed, patient interviewed and examined.  The plan for general anesthesia was explained.  Questions were answered and the consent was signed.  Debby WAY         Ready For Surgery From Anesthesia Perspective.

## 2020-01-29 NOTE — ASSESSMENT & PLAN NOTE
-see above  -TTE above  -continue lopressor and lisinopril   -holding home HCTZ while diuresing   -continue tele monitoring  -cardiac diet with fluid restriction  -strict I&Os and daily weights  -outpt follow up with Cardiology at [place]

## 2020-01-29 NOTE — PLAN OF CARE
01/29/20 0820   Post-Acute Status   Post-Acute Authorization Other   Other Status No Post-Acute Service Needs

## 2020-01-29 NOTE — PROGRESS NOTES
[facility] Hospital Medicine  Progress Note    Patient Name: Vicky Alvarado  MRN: [id number]  Patient Class: IP- Inpatient   Admission Date: 1/28/2020  Length of Stay: 1 days  Attending Physician: Clair Rayo MD  Primary Care Provider: Gordy Cordero MD    [facility] Medicine Team: [facility] HOSP MED J Lisa Espinal NP    Subjective:     Principal Problem:Atrial fibrillation with RVR    HPI:  Ms. Alvarado is a 54 year old female with past medical history of HTN, MVP, depression, and history of opioid dependence. She presents to ED today due to complaints of one week progressively worsening shortness of breath and NAPIER. She reports inability to walk much farther than across the room before developing shortness of breath and this is associated with elevated heart rate. She denies orthopnea, PND, LE swelling, chest pain, light headiness, dizziness, fever, chills, recent sick contacts, or diaphoresis. She does report on January 9th, patient did have her upper teeth pulled for dentures and patient has been on ABX since that time. She reports mother and brother both with diagnosis of AFib also endorses significant family history of early onset CAD; mother with CABG and multiple stents ~ age 50 and sister with CAD and stents ~age 40. All past medical, social, and family history reviewed.     In the ED CBC stable, chemistry unremarkable, magnesium 1.7, , troponin negative, TSH WNL, CXR concerning for edema, and EKG with AFib and RVR. The patient was admitted to the Hospital Medicine Service for further evaluation and management.     Overview/Hospital Course:  Ms. Alvarado was admitted 1/28 due to new onset Afib and CHF. TTE obtained with EF 40%, local segmental WMAs, moderate LAE, normal RV systolic function, moderate MARINE, mild AVS (reduced LENCHO but valve opening appears near normal), LENCHO 1.67 cm2; peak velocity is 1.77 m/s; mean gradient is 8 mmHg, mild MR, mild TR, PASP 29, and CVP 3. Her  diuresis was initiated with IVP lasix, currently net negative 1.8 liters and weight down ~ 4 lbs. Continue GDMT with lisinopril and lopressor (home atenolol discontinued). Will need outpt PET stress for ischemic evaluation and follow up with Cardiology.     Regarding AFib, home atenolol transitioned to lopressor and diltiazem gtt being weaned, currently rate controlled. EP consulted, plans for REENA/DCCV today. Continue OAC with apixaban.     Disposition plans: home with family when medically ready, no home needs identified, will need outpt PET stress and Cardiology follow up.     Interval History: Resting on bedside, family in the room. She reports shortness of breath has improved however remains with palpitations. She understands ongoing diuresis and plans for REENA/DCCV. She was updated on plans to transition BBs. She denies chest pain or shortness of breath. Denies any acute events or distress overnight.     Review of Systems   Constitutional: Positive for activity change. Negative for appetite change, chills, diaphoresis, fatigue, fever and unexpected weight change.   HENT: Negative for congestion, sore throat and trouble swallowing.    Respiratory: Negative for choking, chest tightness, shortness of breath and wheezing.    Cardiovascular: Positive for palpitations. Negative for chest pain and leg swelling.   Gastrointestinal: Negative for abdominal distention, abdominal pain, constipation, diarrhea, nausea and vomiting.   Genitourinary: Negative for decreased urine volume, difficulty urinating, dysuria and urgency.   Musculoskeletal: Negative for arthralgias, back pain, gait problem, joint swelling and myalgias.   Skin: Negative for rash and wound.   Neurological: Negative for dizziness, syncope, weakness, light-headedness and headaches.     Objective:     Vital Signs (Most Recent):  Temp: 96.7 °F (35.9 °C) (01/29/20 0850)  Pulse: 83 (01/29/20 1108)  Resp: 16 (01/29/20 0850)  BP: 136/70 (01/29/20 0850)  SpO2: 96 %  (01/29/20 0850) Vital Signs (24h Range):  Temp:  [96.7 °F (35.9 °C)-98.6 °F (37 °C)] 96.7 °F (35.9 °C)  Pulse:  [] 83  Resp:  [16-24] 16  SpO2:  [91 %-99 %] 96 %  BP: (114-149)/(65-99) 136/70     Weight: 133.6 kg (294 lb 8.6 oz)  Body mass index is 49.01 kg/m².    Intake/Output Summary (Last 24 hours) at 1/29/2020 1200  Last data filed at 1/29/2020 0900  Gross per 24 hour   Intake 1260 ml   Output 2800 ml   Net -1540 ml      Physical Exam   Constitutional: She is oriented to person, place, and time. She appears well-developed and well-nourished. No distress.   HENT:   Head: Normocephalic and atraumatic.   Mouth/Throat: Mucous membranes are normal. Normal dentition.   Eyes: Conjunctivae, EOM and lids are normal.   Neck: Normal range of motion. Neck supple. No JVD present.   Cardiovascular: Normal rate and normal heart sounds. An irregularly irregular rhythm present.   No murmur heard.  Pulmonary/Chest: Effort normal and breath sounds normal. She exhibits no tenderness.   Abdominal: Soft. Bowel sounds are normal. She exhibits no distension. There is no tenderness.   Musculoskeletal: Normal range of motion. She exhibits no edema or deformity.   Neurological: She is alert and oriented to person, place, and time. No cranial nerve deficit.   Skin: Skin is warm and dry. No rash noted. She is not diaphoretic. No erythema.   Psychiatric: She has a normal mood and affect. Judgment and thought content normal.   Nursing note and vitals reviewed.    Significant Labs:   CBC:   Recent Labs   Lab 01/28/20  0531 01/28/20  0543 01/29/20  0544   WBC 6.94  --  7.68   HGB 12.3  --  12.1   HCT 40.3 40 38.8     --  238     CMP:   Recent Labs   Lab 01/28/20  0531 01/29/20  0544    135*   K 4.0 3.6    95   CO2 21* 24   * 132*   BUN 12 18   CREATININE 1.1 1.0   CALCIUM 9.7 9.7   PROT 7.7 8.0   ALBUMIN 4.0 4.2   BILITOT 0.9 0.9   ALKPHOS 91 90   AST 41* 53*   ALT 22 24   ANIONGAP 13 16   EGFRNONAA 56.7* >60.0      Magnesium:   Recent Labs   Lab 01/28/20  0531 01/29/20  0544   MG 1.7 2.1     All pertinent labs within the past 24 hours have been reviewed.    Significant Imaging: I have reviewed all pertinent imaging results/findings within the past 24 hours.    Assessment/Plan:      * Atrial fibrillation with RVR  -admitted 1/28 due to new onset Afib and CHF  -in the ED CBC stable, chemistry unremarkable, magnesium 1.7, , troponin negative, TSH WNL, CXR concerning for edema, and EKG with AFib and RVR  -new onset Afib, no reversible etiology seen; non toxic and TSH WNL, denies alcohol or opioid   -TTE obtained with EF 40%, local segmental WMAs, moderate LAE, normal RV systolic function, moderate MARINE, mild AVS (reduced LENCHO but valve opening appears near normal), LENCHO 1.67 cm2; peak velocity is 1.77 m/s; mean gradient is 8 mmHg, mild MR, mild TR, PASP 29, and CVP 3  -diuresis was initiated with IVP lasix  -currently net negative 1.8 liters and weight down ~ 4 lbs  -continue GDMT with lisinopril and lopressor (home atenolol discontinued)  -CHADSVASc 3  -home atenolol transitioned to lopressor and diltiazem gtt being weaned  -currently rate controlled  -EP consulted, plans for REENA/DCCV today  -continue OAC with apixaban  -continue tele monitoring  -EKG PRN arrhythmia   -will need outpt PET stress for ischemic evaluation and follow up with Cardiology    Acute on chronic systolic (congestive) heart failure  -see above  -TTE above  -continue lopressor and lisinopril   -holding home HCTZ while diuresing   -continue tele monitoring  -cardiac diet with fluid restriction  -strict I&Os and daily weights  -outpt follow up with Cardiology at [facility]     Essential hypertension  -chronic, appears controlled  -continue lopressor and lisinopril   -holding home HCTZ while diuresing   -dose/medication adjustment as appropriate   -monitor     Opioid dependence in remission  -historically, sober for ~5 years  -her sister assists in sobriety and  dispenses pain medications when warranted  -has been using PRN norco since dental extractions very judiciously  -continue inpatient as appropriate  -no additional narcotic/opioids to be administered       VTE Risk Mitigation (From admission, onward)         Ordered     apixaban tablet 5 mg  2 times daily      01/28/20 0901     IP VTE HIGH RISK PATIENT  Once      01/28/20 0856     Place sequential compression device  Until discontinued      01/28/20 0856                Lisa Espinal, TRUNG, AG-ACNP, BC  Department of Hospital Medicine  [facility]  Pager [phone]  [facility] [id number]

## 2020-01-29 NOTE — PLAN OF CARE
01/29/20 0817   Discharge Assessment   Assessment Type Discharge Planning Assessment   Confirmed/corrected address and phone number on facesheet? Yes   Assessment information obtained from? Patient;Medical Record   Expected Length of Stay (days) 2   Communicated expected length of stay with patient/caregiver yes   Prior to hospitilization cognitive status: Alert/Oriented   Prior to hospitalization functional status: Independent   Current cognitive status: Alert/Oriented   Current Functional Status: Independent   Lives With alone   Able to Return to Prior Arrangements yes   Is patient able to care for self after discharge? Yes   Patient's perception of discharge disposition home or selfcare   Readmission Within the Last 30 Days no previous admission in last 30 days   Patient currently being followed by outpatient case management? No   Patient currently receives any other outside agency services? No   Equipment Currently Used at Home none   Do you have any problems affording any of your prescribed medications? TBD   Is the patient taking medications as prescribed? yes   Does the patient have transportation home? Yes   Transportation Anticipated family or friend will provide   Does the patient receive services at the [facility]madin Clinic? No   Discharge Plan A Home   DME Needed Upon Discharge  none   Patient/Family in Agreement with Plan yes   Admitted with new onset A-Fib and CHF. Lives alone. 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 Plan is to DC home. No DC needs identified.

## 2020-01-29 NOTE — SUBJECTIVE & OBJECTIVE
Past Medical History:   Diagnosis Date    Depression     Hx of psychiatric care     Psychiatric problem        History reviewed. No pertinent surgical history.    Review of patient's allergies indicates:  No Known Allergies    No current facility-administered medications on file prior to encounter.      Current Outpatient Medications on File Prior to Encounter   Medication Sig    amoxicillin (AMOXIL) 500 MG Tab Take 1 tablet (500 mg total) by mouth 3 (three) times daily.    atenolol (TENORMIN) 50 MG tablet Take 1 tablet by mouth Twice a day By Mouth 90 days    cyanocobalamin (VITAMIN B-12) 1000 MCG tablet Take 100 mcg by mouth once daily.     DULoxetine (CYMBALTA) 60 MG capsule Take 2 capsules (120 mg total) by mouth once daily.    gluc-shikha-[facility]#[id number] 750 mg-644 mg- 30 mg-1 mg Tab Take 1 tablet by mouth once daily.     hydroCHLOROthiazide (HYDRODIURIL) 50 MG tablet take 1 Tablet by  mouth  Once a day    ibuprofen (ADVIL,MOTRIN) 800 MG tablet take 1 Tablet by mouth  Twice a day    lisinopril (PRINIVIL,ZESTRIL) 40 MG tablet Take 1 tablet by mouth Once a day Orally 90    MELATONIN ORAL Take 1-2 tablets by mouth nightly as needed (Sleep).    multivitamin (THERAGRAN) per tablet Take 1 tablet by mouth once daily.    omega-3 fatty acids-vitamin E 1,000 mg Cap     omeprazole (PRILOSEC) 20 MG capsule take 1 capsule Once a day By Mouth    traZODone (DESYREL) 100 MG tablet Take 1 tablet (100 mg total) by mouth nightly as needed for Insomnia.    pneumococcal 23-dong ps vaccine (PNEUMOVAX 23) 25 mcg/0.5 mL Inject into the muscle.    varicella-zoster gE-AS01B, PF, (SHINGRIX) 50 mcg/0.5 mL injection Inject into the muscle.    varicella-zoster gE-AS01B, PF, (SHINGRIX, PF,) 50 mcg/0.5 mL injection Inject into the muscle.    [DISCONTINUED] acetaminophen-codeine 300-30mg (TYLENOL #3) 300-30 mg Tab Take 1 tablet by mouth every 4 (four) hours as needed for pain.    [DISCONTINUED] amoxicillin (AMOXIL) 500  MG Tab Take 4 tablets (2,000 mg total) by mouth one hour before appointment    [DISCONTINUED] amoxicillin-clavulanate 875-125mg (AUGMENTIN) 875-125 mg per tablet take 1 tablet by mouth twice daily for 10 days    [DISCONTINUED] atenolol (TENORMIN) 50 MG tablet Take 50 mg by mouth 2 (two) times daily.    [DISCONTINUED] atenolol (TENORMIN) 50 MG tablet Take 1 tablet by mouth twice daily    [DISCONTINUED] atenolol (TENORMIN) 50 MG tablet take 1 tablet Twice a day By Mouth    [DISCONTINUED] hydrochlorothiazide (HYDRODIURIL) 50 MG tablet Take 50 mg by mouth once daily.    [DISCONTINUED] hydroCHLOROthiazide (HYDRODIURIL) 50 MG tablet Take 1 tablet by mouth once daily in the morning    [DISCONTINUED] hydroCHLOROthiazide (HYDRODIURIL) 50 MG tablet Take 1 tablet (50 mg total) by mouth once daily.    [DISCONTINUED] ibuprofen (ADVIL,MOTRIN) 800 MG tablet     [DISCONTINUED] ibuprofen (ADVIL,MOTRIN) 800 MG tablet Take 1 tablet by mouth twice daily    [DISCONTINUED] ibuprofen (ADVIL,MOTRIN) 800 MG tablet Take 1 tablet (800 mg total) by mouth 2 (two) times daily.    [DISCONTINUED] lisinopril (PRINIVIL,ZESTRIL) 40 MG tablet Take 40 mg by mouth once daily.    [DISCONTINUED] lisinopril (PRINIVIL,ZESTRIL) 40 MG tablet take 1 tablet by mouth  Once a day    [DISCONTINUED] omeprazole (PRILOSEC) 20 MG capsule Take 20 mg by mouth once daily.     Family History     Problem Relation (Age of Onset)    Cataracts Mother    Diabetes Father    Hypertension Mother, Father, Sister, Brother    Thyroid disease Mother        Tobacco Use    Smoking status: Current Every Day Smoker     Types: Cigarettes    Smokeless tobacco: Never Used   Substance and Sexual Activity    Alcohol use: No    Drug use: No    Sexual activity: Not on file     Review of Systems   Constitution: Negative for chills and fever.   HENT: Negative for congestion.    Eyes: Negative for blurred vision.   Cardiovascular: Positive for dyspnea on exertion, irregular  heartbeat and palpitations. Negative for chest pain, near-syncope, paroxysmal nocturnal dyspnea and syncope.   Respiratory: Positive for shortness of breath. Negative for sputum production and wheezing.    Gastrointestinal: Negative for abdominal pain, nausea and vomiting.   Genitourinary: Negative for dysuria.   Neurological: Negative for dizziness, focal weakness, headaches, light-headedness and weakness.     Objective:     Vital Signs (Most Recent):  Temp: 96.7 °F (35.9 °C) (01/29/20 0850)  Pulse: 83 (01/29/20 1108)  Resp: 16 (01/29/20 0850)  BP: 136/70 (01/29/20 0850)  SpO2: 96 % (01/29/20 0850) Vital Signs (24h Range):  Temp:  [96.7 °F (35.9 °C)-98.6 °F (37 °C)] 96.7 °F (35.9 °C)  Pulse:  [] 83  Resp:  [16-24] 16  SpO2:  [91 %-99 %] 96 %  BP: (114-149)/(65-99) 136/70       Weight: 133.6 kg (294 lb 8.6 oz)  Body mass index is 49.01 kg/m².    SpO2: 96 %  O2 Device (Oxygen Therapy): room air    Physical Exam   Constitutional: She is oriented to person, place, and time. She appears well-developed and well-nourished. No distress.   HENT:   Head: Normocephalic and atraumatic.   Mouth/Throat: Oropharynx is clear and moist.   Eyes: Pupils are equal, round, and reactive to light.   Neck: Neck supple.   Cardiovascular: Normal rate and intact distal pulses.   Irregularly irregular    Pulmonary/Chest: Effort normal.   Abdominal: Soft. She exhibits no distension.   Neurological: She is alert and oriented to person, place, and time.   Skin: Skin is warm. She is not diaphoretic.   Psychiatric: She has a normal mood and affect. Her behavior is normal.   Vitals reviewed.      Significant Labs: All pertinent lab results from the last 24 hours have been reviewed.    Significant Imaging: Echocardiogram: 2D echo with color flow doppler: No results found for this or any previous visit.

## 2020-01-29 NOTE — H&P
[facility] Hospital Medicine  History & Physical    Patient Name: Vicky Alvarado  MRN: [id number]  Admission Date: 1/28/2020  Attending Physician: Clair Rayo MD   Primary Care Provider: Gordy Cordero MD    [facility] Medicine Team: [facility] HOSP MED J Lisa Espinal NP     Patient information was obtained from spouse/SO, past medical records and ER records.     Subjective:     Principal Problem:Atrial fibrillation with RVR    Chief Complaint:   Chief Complaint   Patient presents with    Shortness of Breath     SOB x 1 week, denies fever/cough, tonight with palpitations, CP and NAPIER      HPI: Ms. Alvarado is a 54 year old female with past medical history of HTN, MVP, depression, and history of opioid dependence. She presents to ED today due to complaints of one week progressively worsening shortness of breath and NAPIER. She reports inability to walk much farther than across the room before developing shortness of breath and this is associated with elevated heart rate. She denies orthopnea, PND, LE swelling, chest pain, light headiness, dizziness, fever, chills, recent sick contacts, or diaphoresis. She does report on January 9th, patient did have her upper teeth pulled for dentures and patient has been on ABX since that time. She reports mother and brother both with diagnosis of AFib.     In the ED CBC stable, chemistry unremarkable, magnesium 1.7, , troponin negative, TSH WNL, CXR concerning for edema, and EKG with AFib and RVR. The patient was admitted to the Hospital Medicine Service for further evaluation and management.     Past Medical History:   Diagnosis Date    Depression     Hx of psychiatric care     Psychiatric problem        No past surgical history on file.    Review of patient's allergies indicates:  No Known Allergies    No current facility-administered medications on file prior to encounter.      Current Outpatient Medications on File Prior to Encounter   Medication  Sig    acetaminophen-codeine 300-30mg (TYLENOL #3) 300-30 mg Tab Take 1 tablet by mouth every 4 (four) hours as needed for pain.    amoxicillin (AMOXIL) 500 MG Tab Take 4 tablets (2,000 mg total) by mouth one hour before appointment    amoxicillin (AMOXIL) 500 MG Tab Take 1 tablet (500 mg total) by mouth 3 (three) times daily.    amoxicillin-clavulanate 875-125mg (AUGMENTIN) 875-125 mg per tablet take 1 tablet by mouth twice daily for 10 days    atenolol (TENORMIN) 50 MG tablet Take 50 mg by mouth 2 (two) times daily.    atenolol (TENORMIN) 50 MG tablet Take 1 tablet by mouth twice daily    atenolol (TENORMIN) 50 MG tablet take 1 tablet Twice a day By Mouth    atenolol (TENORMIN) 50 MG tablet Take 1 tablet by mouth Twice a day By Mouth 90 days    cyanocobalamin (VITAMIN B-12) 1000 MCG tablet Take 100 mcg by mouth once daily.    DULoxetine (CYMBALTA) 60 MG capsule Take 2 capsules (120 mg total) by mouth once daily.    gluc-shikha-[facility]#9-Q-hccu-reymundo-bor 750 mg-644 mg- 30 mg-1 mg Tab     hydrochlorothiazide (HYDRODIURIL) 50 MG tablet Take 50 mg by mouth once daily.    hydroCHLOROthiazide (HYDRODIURIL) 50 MG tablet Take 1 tablet by mouth once daily in the morning    hydroCHLOROthiazide (HYDRODIURIL) 50 MG tablet take 1 Tablet by  mouth  Once a day    hydroCHLOROthiazide (HYDRODIURIL) 50 MG tablet Take 1 tablet (50 mg total) by mouth once daily.    ibuprofen (ADVIL,MOTRIN) 800 MG tablet     ibuprofen (ADVIL,MOTRIN) 800 MG tablet Take 1 tablet by mouth twice daily    ibuprofen (ADVIL,MOTRIN) 800 MG tablet take 1 Tablet by mouth  Twice a day    ibuprofen (ADVIL,MOTRIN) 800 MG tablet Take 1 tablet (800 mg total) by mouth 2 (two) times daily.    lisinopril (PRINIVIL,ZESTRIL) 40 MG tablet Take 40 mg by mouth once daily.    lisinopril (PRINIVIL,ZESTRIL) 40 MG tablet take 1 tablet by mouth  Once a day    lisinopril (PRINIVIL,ZESTRIL) 40 MG tablet Take 1 tablet by mouth Once a day Orally 90    multivitamin  (THERAGRAN) per tablet Take 1 tablet by mouth once daily.    omega-3 fatty acids-vitamin E 1,000 mg Cap     omeprazole (PRILOSEC) 20 MG capsule Take 20 mg by mouth once daily.    omeprazole (PRILOSEC) 20 MG capsule take 1 capsule Once a day By Mouth    pneumococcal 23-dong ps vaccine (PNEUMOVAX 23) 25 mcg/0.5 mL Inject into the muscle.    traZODone (DESYREL) 100 MG tablet Take 1 tablet (100 mg total) by mouth nightly as needed for Insomnia.    varicella-zoster gE-AS01B, PF, (SHINGRIX) 50 mcg/0.5 mL injection Inject into the muscle.    varicella-zoster gE-AS01B, PF, (SHINGRIX, PF,) 50 mcg/0.5 mL injection Inject into the muscle.     Family History     Problem Relation (Age of Onset)    Cataracts Mother    Diabetes Father    Hypertension Mother, Father, Sister, Brother    Thyroid disease Mother        Tobacco Use    Smoking status: Current Every Day Smoker     Types: Cigarettes    Smokeless tobacco: Never Used   Substance and Sexual Activity    Alcohol use: No    Drug use: No    Sexual activity: Not on file     Review of Systems   Constitutional: Positive for activity change. Negative for appetite change, chills, diaphoresis, fatigue, fever and unexpected weight change.   HENT: Negative for congestion, sore throat and trouble swallowing.    Eyes: Negative.    Respiratory: Positive for shortness of breath. Negative for choking, chest tightness and wheezing.    Cardiovascular: Positive for palpitations. Negative for chest pain and leg swelling.   Gastrointestinal: Negative for abdominal distention, abdominal pain, constipation, diarrhea, nausea and vomiting.   Endocrine: Negative.    Genitourinary: Negative for decreased urine volume, difficulty urinating, dysuria and urgency.   Musculoskeletal: Negative for arthralgias, back pain, gait problem, joint swelling and myalgias.   Skin: Negative for rash and wound.   Allergic/Immunologic: Negative.    Neurological: Negative for dizziness, syncope, weakness,  light-headedness and headaches.   Hematological: Negative.    Psychiatric/Behavioral: Negative.      Objective:     Vital Signs (Most Recent):  Temp: 98.6 °F (37 °C) (01/28/20 1355)  Pulse: 77 (01/28/20 1500)  Resp: (!) 24 (01/28/20 1500)  BP: 139/67 (01/28/20 1500)  SpO2: 95 % (01/28/20 1330) Vital Signs (24h Range):  Temp:  [97.6 °F (36.4 °C)-98.6 °F (37 °C)] 98.6 °F (37 °C)  Pulse:  [] 77  Resp:  [18-25] 24  SpO2:  [91 %-98 %] 95 %  BP: (105-165)/() 139/67     Weight: (!) 140.6 kg (310 lb)  Body mass index is 51.59 kg/m².    Physical Exam   Constitutional: She is oriented to person, place, and time. She appears well-developed and well-nourished. No distress.   HENT:   Head: Normocephalic and atraumatic.   Mouth/Throat: Mucous membranes are normal. Normal dentition.   Eyes: Conjunctivae, EOM and lids are normal.   Neck: Normal range of motion. Neck supple. No JVD present.   Cardiovascular: Normal heart sounds. An irregularly irregular rhythm present. Tachycardia present.   No murmur heard.  Pulmonary/Chest: Effort normal and breath sounds normal. She exhibits no tenderness.   Abdominal: Soft. Bowel sounds are normal. She exhibits no distension. There is no tenderness.   Musculoskeletal: Normal range of motion. She exhibits no edema or deformity.   Neurological: She is alert and oriented to person, place, and time. No cranial nerve deficit.   Skin: Skin is warm and dry. No rash noted. She is not diaphoretic. No erythema.   Psychiatric: She has a normal mood and affect. Judgment and thought content normal.   Nursing note and vitals reviewed.        CRANIAL NERVES     CN III, IV, VI   Extraocular motions are normal.     Significant Labs:   CBC:   Recent Labs   Lab 01/28/20  0531 01/28/20  0543   WBC 6.94  --    HGB 12.3  --    HCT 40.3 40     --      CMP:   Recent Labs   Lab 01/28/20  0531      K 4.0      CO2 21*   *   BUN 12   CREATININE 1.1   CALCIUM 9.7   PROT 7.7   ALBUMIN  4.0   BILITOT 0.9   ALKPHOS 91   AST 41*   ALT 22   ANIONGAP 13   EGFRNONAA 56.7*     Cardiac Markers:   Recent Labs   Lab 01/28/20  0531   *     Magnesium:   Recent Labs   Lab 01/28/20  0531   MG 1.7     Troponin:   Recent Labs   Lab 01/28/20  0531   TROPONINI 0.013     TSH:   Recent Labs   Lab 01/28/20  0531   TSH 1.225     All pertinent labs within the past 24 hours have been reviewed.    Significant Imaging: I have reviewed all pertinent imaging results/findings within the past 24 hours.    Assessment/Plan:     * Atrial fibrillation with RVR  -admitted 1/28 due to Afib with RVR and new onset heart failure   -in the ED CBC stable, chemistry unremarkable, magnesium 1.7, , troponin negative, TSH WNL, CXR concerning for edema, and EKG with AFib and RVR  -new onset Afib etiology unknown; non toxic and TSH WNL, denies alcohol or opioid use  -rate control initiated in the ED with diltiazem gtt after poor response to IV metoprolol>>> wean as tolerated   -resume home atenolol   -CHADSVASc 2  -OAC with apixaban for now  -diuresis with IV lasix  -TTE pending  -consider REENA/DCCV in AM if remains in Afib  -continue tele monitoring  -EKG PRN arrhythmia     New onset of congestive heart failure  -see above  -TTE pending  -continue home atenolol and lisinopril   -holding home HCTZ while diuresing   -continue tele monitoring  -cardiac diet with fluid restriction  -strict I&Os and daily weights  -outpt follow up with Cardiology at [place]     Essential hypertension  -chronic, appears controlled  -continue home atenolol and lisinopril   -holding home HCTZ while diuresing   -dose/medication adjustment as appropriate   -monitor     Opioid dependence in remission  -historically, sober for ~5 years  -her sister assists in sobriety and dispenses pain medications when warranted  -has been using PRN norco since dental extractions very judiciously  -continue inpatient as appropriate  -no additional narcotic/opioids to be  administered       VTE Risk Mitigation (From admission, onward)         Ordered     apixaban tablet 5 mg  2 times daily      01/28/20 0901     IP VTE HIGH RISK PATIENT  Once      01/28/20 0856     Place sequential compression device  Until discontinued      01/28/20 0856                Lisa Espinal, TRUNG, AG-ACNP, BC  Department of Hospital Medicine  [facility]  Pager [phone]  [facility] [id number]

## 2020-01-29 NOTE — HOSPITAL COURSE
Ms. Alvarado was admitted 1/28 due to new onset Afib and CHF. TTE obtained with EF 40%, local segmental WMAs, moderate LAE, normal RV systolic function, moderate MARINE, mild AVS (reduced LENCHO but valve opening appears near normal), LENCHO 1.67 cm2; peak velocity is 1.77 m/s; mean gradient is 8 mmHg, mild MR, mild TR, PASP 29, and CVP 3. Her diuresis was initiated with IVP lasix, net negative 2.0 liters and weight down ~ 4 lbs. Continue GDMT with lisinopril and lopressor (home atenolol discontinued). Home HCTZ resumed. Regarding AFib, home atenolol transitioned to lopressor and diltiazem gtt weaned. EP consulted, s/p successful REENA/DCCV 1/29. Continue OAC with apixaban.     Disposition: home with family, no home needs identified, outpt PET stress ordered and Cardiology follow up scheduled; EP follow up advised.

## 2020-01-29 NOTE — ASSESSMENT & PLAN NOTE
-new diagnosis, patient risk factors include HTN, female sex and family history   -HR controlled with diltiazem drip     EP Plan:   -pt has CHADS-VASC score of 3, c/w Eliquis 5 mg BID for stroke prevention   -plan for REENA/DCCV today (REENA needed as unclear of afib onset and was not on AC prior to this hospitalization)  -given heart failure, would avoid calcium channel blockers and discontinue home atenolol and use metoprolol succinate 25 mg BID with up-titration as tolerated  -check electrolytes (replace as needed- goal potassium >4 and magnesium >2)   -monitor on telemetry

## 2020-01-29 NOTE — PROGRESS NOTES
Patient admitted to recovery see [facility] for complete assessment pacu bcg's maintained safety measures verified patient instructed on pain scale and pt verbalized understanding. ekg done and placed in patient's chart. Also called patient's sister and updated on patient location with the permission of patient.

## 2020-01-29 NOTE — ANESTHESIA POSTPROCEDURE EVALUATION
Anesthesia Post Evaluation    Patient: Vicky Alvarado    Procedure(s) Performed: Procedure(s) (LRB):  CARDIOVERSION (N/A)  ECHOCARDIOGRAM,TRANSESOPHAGEAL (N/A)    Final Anesthesia Type: general    Patient location during evaluation: PACU  Patient participation: Yes- Able to Participate  Level of consciousness: awake and alert  Post-procedure vital signs: reviewed and stable  Pain management: adequate  Airway patency: patent    PONV status at discharge: No PONV  Anesthetic complications: no      Cardiovascular status: hemodynamically stable  Respiratory status: unassisted  Hydration status: euvolemic  Follow-up not needed.          Vitals Value Taken Time   BP 96/49 1/29/2020  3:33 PM   Temp 36.2 °C (97.2 °F) 1/29/2020  3:15 PM   Pulse 51 1/29/2020  3:33 PM   Resp 18 1/29/2020  3:15 PM   SpO2 95 % 1/29/2020  3:33 PM   Vitals shown include unvalidated device data.      No case tracking events are documented in the log.      Pain/Tolu Score: Pain Rating Prior to Med Admin: 8 (1/28/2020  5:49 PM)

## 2020-01-29 NOTE — HPI
54 year old female with history of depression, HTN and mitral valve prolapse presents with 1 week of progressively worsening shortness of breath and palpitations. Of note, on 1/9/20, she had her upper teeth pulled for dentures and been on penicillin since that time. Regarding her family history, her mother and brother both have atrial fibrillation. She takes atenolol 10 mg q.d. for hypertension.     Initial EKG: atrial fibrillation with RVR and on subsequent ones; Review of telemetry shows atrial fibrillation as well.    The admitting team started her on diltiazem drip at 5 mg/hr and on Eliquis 5 mg BID.     Labs show K 3.6, Mg not checked, TSH within normal limits, troponin x1 negative and 's.     CXR show interstitial pulmonary edema    TTE (January 2020):   · Atrial fibrillation observed.  · Left ventricular systolic function. The estimated ejection fraction is 40%.  · Local segmental wall motion abnormalities.  · Moderate left atrial enlargement.  · Normal right ventricular systolic function.  · Moderate right atrial enlargement.  · Possibly mild aortic valve stenosis ( reduced LENCHO but valve opening appears near normal).  · Aortic valve area is 1.67 cm2; peak velocity is 1.77 m/s; mean gradient is 8 mmHg.  · Mild mitral regurgitation.  · Mild tricuspid regurgitation.  · Normal central venous pressure (3 mmHg).  · The estimated PA systolic pressure is 29 mmHg.    EP is consulted for assistance with management of atrial fibrillation.

## 2020-01-30 ENCOUNTER — TELEPHONE (OUTPATIENT)
Dept: ELECTROPHYSIOLOGY | Facility: CLINIC | Age: 55
End: 2020-01-30

## 2020-01-30 ENCOUNTER — RESEARCH ENCOUNTER (OUTPATIENT)
Dept: RESEARCH | Facility: HOSPITAL | Age: 55
End: 2020-01-30

## 2020-01-30 VITALS
BODY MASS INDEX: 48.82 KG/M2 | WEIGHT: 293 LBS | DIASTOLIC BLOOD PRESSURE: 55 MMHG | OXYGEN SATURATION: 91 % | TEMPERATURE: 98 F | HEIGHT: 65 IN | SYSTOLIC BLOOD PRESSURE: 119 MMHG | HEART RATE: 67 BPM | RESPIRATION RATE: 18 BRPM

## 2020-01-30 DIAGNOSIS — I48.91 ATRIAL FIBRILLATION, UNSPECIFIED TYPE: Primary | ICD-10-CM

## 2020-01-30 LAB
ALBUMIN SERPL BCP-MCNC: 3.8 G/DL (ref 3.5–5.2)
ALP SERPL-CCNC: 81 U/L (ref 55–135)
ALT SERPL W/O P-5'-P-CCNC: 20 U/L (ref 10–44)
ANION GAP SERPL CALC-SCNC: 14 MMOL/L (ref 8–16)
AST SERPL-CCNC: 43 U/L (ref 10–40)
BASOPHILS # BLD AUTO: 0.09 K/UL (ref 0–0.2)
BASOPHILS NFR BLD: 1.3 % (ref 0–1.9)
BILIRUB SERPL-MCNC: 0.7 MG/DL (ref 0.1–1)
BUN SERPL-MCNC: 31 MG/DL (ref 6–20)
CALCIUM SERPL-MCNC: 9.2 MG/DL (ref 8.7–10.5)
CHLORIDE SERPL-SCNC: 99 MMOL/L (ref 95–110)
CO2 SERPL-SCNC: 24 MMOL/L (ref 23–29)
CREAT SERPL-MCNC: 1.5 MG/DL (ref 0.5–1.4)
DIFFERENTIAL METHOD: ABNORMAL
EOSINOPHIL # BLD AUTO: 0.2 K/UL (ref 0–0.5)
EOSINOPHIL NFR BLD: 3.6 % (ref 0–8)
ERYTHROCYTE [DISTWIDTH] IN BLOOD BY AUTOMATED COUNT: 15.7 % (ref 11.5–14.5)
EST. GFR  (AFRICAN AMERICAN): 44.9 ML/MIN/1.73 M^2
EST. GFR  (NON AFRICAN AMERICAN): 38.9 ML/MIN/1.73 M^2
GLUCOSE SERPL-MCNC: 135 MG/DL (ref 70–110)
HCT VFR BLD AUTO: 42.5 % (ref 37–48.5)
HGB BLD-MCNC: 12 G/DL (ref 12–16)
IMM GRANULOCYTES # BLD AUTO: 0.02 K/UL (ref 0–0.04)
IMM GRANULOCYTES NFR BLD AUTO: 0.3 % (ref 0–0.5)
LYMPHOCYTES # BLD AUTO: 2 K/UL (ref 1–4.8)
LYMPHOCYTES NFR BLD: 29.2 % (ref 18–48)
MAGNESIUM SERPL-MCNC: 1.9 MG/DL (ref 1.6–2.6)
MCH RBC QN AUTO: 25.7 PG (ref 27–31)
MCHC RBC AUTO-ENTMCNC: 28.2 G/DL (ref 32–36)
MCV RBC AUTO: 91 FL (ref 82–98)
MONOCYTES # BLD AUTO: 0.8 K/UL (ref 0.3–1)
MONOCYTES NFR BLD: 11.9 % (ref 4–15)
NEUTROPHILS # BLD AUTO: 3.6 K/UL (ref 1.8–7.7)
NEUTROPHILS NFR BLD: 53.7 % (ref 38–73)
NRBC BLD-RTO: 0 /100 WBC
PLATELET # BLD AUTO: 214 K/UL (ref 150–350)
PMV BLD AUTO: 10.9 FL (ref 9.2–12.9)
POTASSIUM SERPL-SCNC: 3.4 MMOL/L (ref 3.5–5.1)
PROT SERPL-MCNC: 7.1 G/DL (ref 6–8.4)
RBC # BLD AUTO: 4.67 M/UL (ref 4–5.4)
SODIUM SERPL-SCNC: 137 MMOL/L (ref 136–145)
WBC # BLD AUTO: 6.72 K/UL (ref 3.9–12.7)

## 2020-01-30 PROCEDURE — 83735 ASSAY OF MAGNESIUM: CPT

## 2020-01-30 PROCEDURE — 63600175 PHARM REV CODE 636 W HCPCS: Performed by: NURSE PRACTITIONER

## 2020-01-30 PROCEDURE — 99239 PR HOSPITAL DISCHARGE DAY,>30 MIN: ICD-10-PCS | Mod: ,,, | Performed by: NURSE PRACTITIONER

## 2020-01-30 PROCEDURE — 25000003 PHARM REV CODE 250: Performed by: NURSE PRACTITIONER

## 2020-01-30 PROCEDURE — 36415 COLL VENOUS BLD VENIPUNCTURE: CPT

## 2020-01-30 PROCEDURE — 99239 HOSP IP/OBS DSCHRG MGMT >30: CPT | Mod: ,,, | Performed by: NURSE PRACTITIONER

## 2020-01-30 PROCEDURE — 85025 COMPLETE CBC W/AUTO DIFF WBC: CPT

## 2020-01-30 PROCEDURE — 80053 COMPREHEN METABOLIC PANEL: CPT

## 2020-01-30 RX ORDER — HYDROCHLOROTHIAZIDE 50 MG/1
50 TABLET ORAL DAILY
Qty: 90 TABLET | Refills: 5 | Status: ON HOLD | OUTPATIENT
Start: 2020-01-30 | End: 2020-07-24 | Stop reason: SDUPTHER

## 2020-01-30 RX ORDER — POTASSIUM CHLORIDE 750 MG/1
30 CAPSULE, EXTENDED RELEASE ORAL
Status: COMPLETED | OUTPATIENT
Start: 2020-01-30 | End: 2020-01-30

## 2020-01-30 RX ORDER — LISINOPRIL 40 MG/1
40 TABLET ORAL DAILY
Qty: 90 TABLET | Refills: 5 | Status: ON HOLD | OUTPATIENT
Start: 2020-01-30 | End: 2020-07-24 | Stop reason: SDUPTHER

## 2020-01-30 RX ORDER — MAGNESIUM SULFATE HEPTAHYDRATE 40 MG/ML
2 INJECTION, SOLUTION INTRAVENOUS ONCE
Status: COMPLETED | OUTPATIENT
Start: 2020-01-30 | End: 2020-01-30

## 2020-01-30 RX ORDER — METOPROLOL SUCCINATE 50 MG/1
100 TABLET, EXTENDED RELEASE ORAL DAILY
Qty: 30 TABLET | Refills: 5 | Status: SHIPPED | OUTPATIENT
Start: 2020-01-30 | End: 2020-03-05 | Stop reason: SDUPTHER

## 2020-01-30 RX ORDER — AMOXICILLIN 500 MG/1
500 CAPSULE ORAL 3 TIMES DAILY
Qty: 6 CAPSULE | Refills: 0
Start: 2020-01-30 | End: 2020-02-01

## 2020-01-30 RX ORDER — LISINOPRIL 20 MG/1
40 TABLET ORAL DAILY
Status: DISCONTINUED | OUTPATIENT
Start: 2020-01-31 | End: 2020-01-30 | Stop reason: HOSPADM

## 2020-01-30 RX ADMIN — APIXABAN 5 MG: 5 TABLET, FILM COATED ORAL at 09:01

## 2020-01-30 RX ADMIN — CYANOCOBALAMIN TAB 1000 MCG 1000 MCG: 1000 TAB at 09:01

## 2020-01-30 RX ADMIN — PANTOPRAZOLE SODIUM 40 MG: 40 TABLET, DELAYED RELEASE ORAL at 09:01

## 2020-01-30 RX ADMIN — THERA TABS 1 TABLET: TAB at 09:01

## 2020-01-30 RX ADMIN — POTASSIUM CHLORIDE 30 MEQ: 750 CAPSULE, EXTENDED RELEASE ORAL at 11:01

## 2020-01-30 RX ADMIN — HYDROCODONE BITARTRATE AND ACETAMINOPHEN 1 TABLET: 7.5; 325 TABLET ORAL at 09:01

## 2020-01-30 RX ADMIN — POTASSIUM CHLORIDE 30 MEQ: 750 CAPSULE, EXTENDED RELEASE ORAL at 09:01

## 2020-01-30 RX ADMIN — AMOXICILLIN 500 MG: 500 CAPSULE ORAL at 09:01

## 2020-01-30 RX ADMIN — SENNOSIDES AND DOCUSATE SODIUM 1 TABLET: 8.6; 5 TABLET ORAL at 09:01

## 2020-01-30 RX ADMIN — MAGNESIUM SULFATE IN WATER 2 G: 40 INJECTION, SOLUTION INTRAVENOUS at 09:01

## 2020-01-30 RX ADMIN — Medication 1 CAPSULE: at 09:01

## 2020-01-30 RX ADMIN — DULOXETINE HYDROCHLORIDE 120 MG: 60 CAPSULE, DELAYED RELEASE ORAL at 09:01

## 2020-01-30 RX ADMIN — METOPROLOL TARTRATE 50 MG: 50 TABLET ORAL at 09:01

## 2020-01-30 NOTE — CARE UPDATE
01/30/2020      Vicky Alvarado  [address] [id number]          Hospital Medicine Dept.  [facility] [address] [place] [address]  [phone] after hours  [phone] fax Vicky Alvarado has been hospitalized at [facility] since 1/28/2020.  Please excuse the patient from duties.  Patient may return on 02/03/2020.  No restrictions.     Please contact me if you have any questions.      Lisa Espinal DNP, AG-ACNP, BC  Department of Hospital Medicine  [facility]

## 2020-01-30 NOTE — PROGRESS NOTES
Date Consent signed: 1/30/2020    Sponsor: Dr. Jose Hampton and [facility]    Study Title/IRB Number: [id number]    Principle Investigator: Dr Be Cardoso    Present for Discussion: NATIVIDAD Santos    Is LAR Consenting for Subject: No    Prior to the Informed Consent (IC) being signed, or any study protocol required data collection, testing, procedure, or intervention being performed, the following was done and/or discussed:   Patient was given a copy of the IC for review    Purpose of the study and qualifications to participate    Study design, Follow up schedule, and tests or procedures done at each visit   Confidentiality and HIPAA Authorization for Release of Medical Records for the research trial/ subject's rights/research related injury   Risk, Benefits, Alternative Treatments, Compensation and Costs   Participation in the research trial is voluntary and patient may withdraw at anytime   Contact information for study related questions    Patient verbalizes understanding of the above: Yes  Contact information for CRC and PI given to patient: Yes  Patient able to adequately summarize: the purpose of the study, the risks associated with the study, and all procedures, testing, and follow-ups associated with the study: Yes    Vicky Alvarado signed the informed consent form for the Vicky Alvarado  research study with an IRB approval date of 1/23/2019.  Each page of the consent form was reviewed with Vicky Alvarado  (and pts family) and all questions answered satisfactorily. Vicky Alvarado  signed the consent form and received a copy of same. The original consent was scanned into electronic medical records (EPIC) and filed into the subject's research study binder.    Dr. Rayo, Sub-Investigator confirmed eligibility.      Vicky Alvarado completed the study questionnaires and was randomized to oral torsemide.  Lisa Espinal NP was notified of the randomization.

## 2020-01-30 NOTE — ASSESSMENT & PLAN NOTE
-historically, sober for ~5 years  -her sister assists in sobriety and dispenses pain medications when warranted  -has been using PRN norco since dental extractions very judiciously

## 2020-01-30 NOTE — PLAN OF CARE
Pt remained free from falls/trauma/injures. No complaints of CP/SOB/discomfort. Pt remained  in SR/SB duringthe  night. Successful  DCCV/REENA done 1/29. Lopressor 25mg given for A.fib. Still taking amoxicillin for infection from prior dental work. Pt received pain management medication during the shift. Electrolytes replaced as needed. VSS. Fall bundle  in place. POC explained, no questions at this time. Pt tolerating care.

## 2020-01-30 NOTE — NURSING
Pt discharged per MD orders.  Tele discontinued and returned to station.  IV discontinued; catheter tip intact 1.  Medication list and prescriptions reviewed; prescriptions sent to pt preferred pharmacy and printed prescriptions provided.  Pt verbalizes understanding of all written and verbal discharge instructions.  Pt awaiting family/escort arrival.  Will continue to monitor.

## 2020-01-30 NOTE — ASSESSMENT & PLAN NOTE
-admitted 1/28 due to new onset Afib and CHF  -in the ED CBC stable, chemistry unremarkable, magnesium 1.7, , troponin negative, TSH WNL, CXR concerning for edema, and EKG with AFib and RVR  -new onset Afib, no reversible etiology seen; non toxic and TSH WNL, denies alcohol or opioid   -TTE obtained with EF 40%, local segmental WMAs, moderate LAE, normal RV systolic function, moderate MARINE, mild AVS (reduced LENCHO but valve opening appears near normal), LENCHO 1.67 cm2; peak velocity is 1.77 m/s; mean gradient is 8 mmHg, mild MR, mild TR, PASP 29, and CVP 3  -diuresis was initiated with IVP lasix >>> tolerated  -net negative 2.0 liters and weight down ~ 4 lbs  -continue GDMT with lisinopril and lopressor (home atenolol discontinued)  -home HCTZ resumed at [facility]  -CHADSVAS 3  -EP consulted, s/p successful REENA/DCCV 1/29  -continue OAC with apixaban  -outpt PET stress for ischemic evaluation ordered and follow up with Cardiology scheduled  -EP follow up advised

## 2020-01-30 NOTE — ASSESSMENT & PLAN NOTE
-see above  -TTE above  -continue lopressor and lisinopril   -HCTZ resumed  -cardiac diet with fluid restriction and daily weights advised  -outpt follow up with Cardiology at [facility] scheduled

## 2020-01-30 NOTE — PLAN OF CARE
Pt d/c home with family.       01/30/20 1416   Final Note   Assessment Type Final Discharge Note   Anticipated Discharge Disposition Home   Hospital Follow Up  Appt(s) scheduled? Yes   Discharge plans and expectations educations in teach back method with documentation complete? Yes       Future Appointments   Date Time Provider Department Center   1/31/2020 12:00 PM PTR DIETITIAN [facility]   2/7/2020 12:00 PM PTR DIETITIAN [facility]   2/14/2020 12:00 PM PTR DIETITIAN [facility]   2/21/2020 12:00 PM PTR DIETITIAN [facility]   2/21/2020  2:00 PM Irina Cantu MD [facility] CARDIO [facility]   2/28/2020 12:00 PM PTR DIETITIAN [facility]   3/3/2020 10:00 AM EKG, APPT [facility] EKG [facility]   3/3/2020 10:30 AM Marah Hunter NP [facility]       Julie Haase RN  Case Management [phone]

## 2020-01-30 NOTE — DISCHARGE SUMMARY
[facility] Hospital Medicine  Discharge Summary      Patient Name: Vicky Alvarado  MRN: [id number]  Admission Date: 1/28/2020  Hospital Length of Stay: 2 days  Discharge Date and Time:  01/30/2020 12:58 PM  Attending Physician: Rogelio Denise MD   Discharging Provider: Lisa Espinal NP  Primary Care Provider: Gordy Cordero MD  [facility] Medicine Team: [facility] HOSP MED  Lisa Espinal NP    HPI:   Ms. Alvarado is a 54 year old female with past medical history of HTN, MVP, depression, and history of opioid dependence. She presents to ED today due to complaints of one week progressively worsening shortness of breath and NAPIER. She reports inability to walk much farther than across the room before developing shortness of breath and this is associated with elevated heart rate. She denies orthopnea, PND, LE swelling, chest pain, light headiness, dizziness, fever, chills, recent sick contacts, or diaphoresis. She does report on January 9th, patient did have her upper teeth pulled for dentures and patient has been on ABX since that time. She reports mother and brother both with diagnosis of AFib also endorses significant family history of early onset CAD; mother with CABG and multiple stents ~ age 50 and sister with CAD and stents ~age 40. All past medical, social, and family history reviewed.     In the ED CBC stable, chemistry unremarkable, magnesium 1.7, , troponin negative, TSH WNL, CXR concerning for edema, and EKG with AFib and RVR. The patient was admitted to the Hospital Medicine Service for further evaluation and management.     Procedure(s) (LRB):  CARDIOVERSION (N/A)  ECHOCARDIOGRAM,TRANSESOPHAGEAL (N/A)      Hospital Course:   Ms. Alvarado was admitted 1/28 due to new onset Afib and CHF. TTE obtained with EF 40%, local segmental WMAs, moderate LAE, normal RV systolic function, moderate MARINE, mild AVS (reduced LENCHO but valve opening appears near normal), LENCHO 1.67 cm2; peak  velocity is 1.77 m/s; mean gradient is 8 mmHg, mild MR, mild TR, PASP 29, and CVP 3. Her diuresis was initiated with IVP lasix, net negative 2.0 liters and weight down ~ 4 lbs. Continue GDMT with lisinopril and lopressor (home atenolol discontinued). Home HCTZ resumed. Regarding AFib, home atenolol transitioned to lopressor and diltiazem gtt weaned. EP consulted, s/p successful REENA/DCCV 1/29. Continue OAC with apixaban.     Disposition: home with family, no home needs identified, outpt PET stress ordered and Cardiology follow up scheduled; EP follow up advised.      Consults:   Consults (From admission, onward)        Status Ordering Provider     Inpatient consult to Electrophysiology  Once     Provider:  (Not yet assigned)    BI Peace        * Atrial fibrillation with RVR  -admitted 1/28 due to new onset Afib and CHF  -in the ED CBC stable, chemistry unremarkable, magnesium 1.7, , troponin negative, TSH WNL, CXR concerning for edema, and EKG with AFib and RVR  -new onset Afib, no reversible etiology seen; non toxic and TSH WNL, denies alcohol or opioid   -TTE obtained with EF 40%, local segmental WMAs, moderate LAE, normal RV systolic function, moderate MARINE, mild AVS (reduced LENCHO but valve opening appears near normal), LENCHO 1.67 cm2; peak velocity is 1.77 m/s; mean gradient is 8 mmHg, mild MR, mild TR, PASP 29, and CVP 3  -diuresis was initiated with IVP lasix >>> tolerated  -net negative 2.0 liters and weight down ~ 4 lbs  -continue GDMT with lisinopril and lopressor (home atenolol discontinued)  -home HCTZ resumed at [place]  -CHADSVASc 3  -EP consulted, s/p successful REENA/DCCV 1/29  -continue OAC with apixaban  -outpt PET stress for ischemic evaluation ordered and follow up with Cardiology scheduled  -EP follow up advised    Acute on chronic systolic (congestive) heart failure  -see above  -TTE above  -continue lopressor and lisinopril   -HCTZ resumed  -cardiac diet with fluid restriction and  daily weights advised  -outpt follow up with Cardiology at [place] scheduled     Essential hypertension  -chronic, appears controlled  -continue lopressor, HCTZ, and lisinopril     Opioid dependence in remission  -historically, sober for ~5 years  -her sister assists in sobriety and dispenses pain medications when warranted  -has been using PRN norco since dental extractions very judiciously      Final Active Diagnoses:    Diagnosis Date Noted POA    PRINCIPAL PROBLEM:  Atrial fibrillation with RVR [I48.91] 01/28/2020 Yes    Acute on chronic systolic (congestive) heart failure [I50.23] 01/28/2020 Unknown    Essential hypertension [I10] 01/28/2020 Unknown    Opioid dependence in remission [F11.21]  Yes      Problems Resolved During this Admission:     Discharged Condition: good    Disposition: Home or Self Care    Follow Up:  Follow-up Information     Gabriel Hawley MD In 4 weeks.    Specialties:  Electrophysiology, Cardiovascular Disease  Why:  Regarding atrial fibrillation management   Contact information:  [address] [place] [id number]  [phone]                 Patient Instructions:      Ambulatory referral to Cardiology   Referral Priority: Routine Referral Type: Consultation   Referral Reason: Specialty Services Required   Requested Specialty: Cardiology   Number of Visits Requested: 1     Ambulatory Referral to Electrophysiology   Referral Priority: Routine Referral Type: Consultation   Referral Reason: Specialty Services Required   Requested Specialty: Electrophysiology   Number of Visits Requested: 1     Diet Cardiac     Notify your health care provider if you experience any of the following:  persistent nausea and vomiting or diarrhea     Notify your health care provider if you experience any of the following:  severe uncontrolled pain     Notify your health care provider if you experience any of the following:  temperature >100.4     Notify your health care provider if you experience any  of the following:  difficulty breathing or increased cough     Notify your health care provider if you experience any of the following:  persistent dizziness, light-headedness, or visual disturbances     Notify your health care provider if you experience any of the following:  severe persistent headache     Notify your health care provider if you experience any of the following:  increased confusion or weakness     Cardiac PET Scan Stress   Standing Status: Future Standing Exp. Date: 01/30/21     Order Specific Question Answer Comments   Which medicaton for the stress procedure? Dipyridamole      Activity as tolerated     Review of Systems   Constitutional:  Negative for activity changed, appetite change, chills, diaphoresis, fatigue, fever and unexpected weight change.   HENT: Negative for congestion, sore throat and trouble swallowing.    Respiratory: Negative for choking, chest tightness, shortness of breath and wheezing.    Cardiovascular:  Negative for palpitations, chest pain and leg swelling.   Gastrointestinal: Negative for abdominal distention, abdominal pain, constipation, diarrhea, nausea and vomiting.   Genitourinary: Negative for decreased urine volume, difficulty urinating, dysuria and urgency.   Musculoskeletal: Negative for arthralgias, back pain, gait problem, joint swelling and myalgias.   Skin: Negative for rash and wound.   Neurological: Negative for dizziness, syncope, weakness, light-headedness and headaches.     Physical Exam   Constitutional: She is oriented to person, place, and time. She appears well-developed and well-nourished. No distress.   HENT:   Head: Normocephalic and atraumatic.   Mouth/Throat: Mucous membranes are normal. Normal dentition.   Eyes: Conjunctivae, EOM and lids are normal.   Neck: Normal range of motion. Neck supple. No JVD present.   Cardiovascular: Normal rate and normal heart sounds. Regular rate and rhythm present.  No murmur heard.  Pulmonary/Chest: Effort normal  and breath sounds normal. She exhibits no tenderness.   Abdominal: Soft. Bowel sounds are normal. She exhibits no distension. There is no tenderness.   Musculoskeletal: Normal range of motion. She exhibits no edema or deformity.   Neurological: She is alert and oriented to person, place, and time. No cranial nerve deficit.   Skin: Skin is warm and dry. No rash noted. She is not diaphoretic. No erythema.   Psychiatric: She has a normal mood and affect. Judgment and thought content normal.   Nursing note and vitals reviewed.    Significant Diagnostic Studies: Labs:   BMP:   Recent Labs   Lab 01/29/20  0544 01/30/20  0326   * 135*   * 137   K 3.6 3.4*   CL 95 99   CO2 24 24   BUN 18 31*   CREATININE 1.0 1.5*   CALCIUM 9.7 9.2   MG 2.1 1.9   , CBC   Recent Labs   Lab 01/29/20 0544 01/30/20  0326   WBC 7.68 6.72   HGB 12.1 12.0   HCT 38.8 42.5    214    and All labs within the past 24 hours have been reviewed    Pending Diagnostic Studies:     None         Medications:  Reconciled Home Medications:      Medication List      START taking these medications    amoxicillin 500 MG capsule  Commonly known as:  AMOXIL  Take 1 capsule (500 mg total) by mouth 3 (three) times daily. for 2 days  Replaces:  amoxicillin 500 MG Tab     Eliquis 5 mg Tab  Generic drug:  apixaban  Take 1 tablet (5 mg total) by mouth 2 (two) times daily.     metoprolol succinate 50 MG 24 hr tablet  Commonly known as:  TOPROL-XL  Take 2 tablets (100 mg total) by mouth once daily.        CHANGE how you take these medications    hydroCHLOROthiazide 50 MG tablet  Commonly known as:  HYDRODIURIL  Take 1 tablet (50 mg total) by mouth once daily.  What changed:    · how much to take  · how to take this  · when to take this     lisinopril 40 MG tablet  Commonly known as:  PRINIVIL,ZESTRIL  Take 1 tablet (40 mg total) by mouth once daily.  What changed:    · how much to take  · how to take this  · when to take this        CONTINUE taking these  medications    DULoxetine 60 MG capsule  Commonly known as:  CYMBALTA  Take 2 capsules (120 mg total) by mouth once daily.     teuqxorf-fzem-rtu3-C-radha-bosw 750 mg-644 mg- 30 mg-1 mg Tab  Take 1 tablet by mouth once daily.     ibuprofen 800 MG tablet  Commonly known as:  ADVIL,MOTRIN  take 1 Tablet by mouth  Twice a day     MELATONIN ORAL  Take 1-2 tablets by mouth nightly as needed (Sleep).     multivitamin per tablet  Commonly known as:  THERAGRAN  Take 1 tablet by mouth once daily.     omega-3 fatty acids-vitamin E 1,000 mg Cap     omeprazole 20 MG capsule  Commonly known as:  PRILOSEC  take 1 capsule Once a day By Mouth     traZODone 100 MG tablet  Commonly known as:  DESYREL  Take 1 tablet (100 mg total) by mouth nightly as needed for Insomnia.     Vitamin B-12 1000 MCG tablet  Generic drug:  cyanocobalamin  Take 100 mcg by mouth once daily.        STOP taking these medications    amoxicillin 500 MG Tab  Commonly known as:  AMOXIL  Replaced by:  amoxicillin 500 MG capsule     atenolol 50 MG tablet  Commonly known as:  TENORMIN     pneumococcal 23-dong ps 25 mcg/0.5 mL vaccine  Commonly known as:  PNEUMOVAX 23     Shingrix (PF) 50 mcg/0.5 mL injection  Generic drug:  varicella-zoster [id number] (PF)          Indwelling Lines/Drains at time of discharge:   Lines/Drains/Airways     None               Time spent on the discharge of patient: 65 minutes  Patient was seen and examined on the date of discharge and determined to be suitable for discharge.      Lisa Espinal, DNP, AG-ACNP, BC  Department of Hospital Medicine  [facility]

## 2020-01-31 ENCOUNTER — CLINICAL SUPPORT (OUTPATIENT)
Dept: INTERNAL MEDICINE | Facility: CLINIC | Age: 55
End: 2020-01-31
Payer: COMMERCIAL

## 2020-01-31 DIAGNOSIS — Z00.00 ROUTINE GENERAL MEDICAL EXAMINATION AT A HEALTH CARE FACILITY: Primary | ICD-10-CM

## 2020-01-31 PROCEDURE — 99412 PREVENTIVE COUNSELING GROUP: CPT | Mod: S$GLB,,, | Performed by: DIETITIAN, REGISTERED

## 2020-01-31 PROCEDURE — 99412 PR PREVENT COUNSEL,GROUP,60 MIN: ICD-10-PCS | Mod: S$GLB,,, | Performed by: DIETITIAN, REGISTERED

## 2020-02-03 LAB
ASCENDING AORTA: 2.9 CM
BSA FOR ECHO PROCEDURE: 2.47 M2
SINUS: 2.7 CM
STJ: 2.5 CM

## 2020-02-07 ENCOUNTER — CLINICAL SUPPORT (OUTPATIENT)
Dept: INTERNAL MEDICINE | Facility: CLINIC | Age: 55
End: 2020-02-07
Payer: COMMERCIAL

## 2020-02-07 DIAGNOSIS — Z00.00 ROUTINE GENERAL MEDICAL EXAMINATION AT A HEALTH CARE FACILITY: Primary | ICD-10-CM

## 2020-02-07 PROCEDURE — 99412 PREVENTIVE COUNSELING GROUP: CPT | Mod: S$GLB,,, | Performed by: DIETITIAN, REGISTERED

## 2020-02-07 PROCEDURE — 99412 PR PREVENT COUNSEL,GROUP,60 MIN: ICD-10-PCS | Mod: S$GLB,,, | Performed by: DIETITIAN, REGISTERED

## 2020-02-14 ENCOUNTER — CLINICAL SUPPORT (OUTPATIENT)
Dept: INTERNAL MEDICINE | Facility: CLINIC | Age: 55
End: 2020-02-14
Payer: COMMERCIAL

## 2020-02-14 DIAGNOSIS — Z00.00 ROUTINE GENERAL MEDICAL EXAMINATION AT A HEALTH CARE FACILITY: Primary | ICD-10-CM

## 2020-02-14 PROCEDURE — 99412 PREVENTIVE COUNSELING GROUP: CPT | Mod: S$GLB,,, | Performed by: DIETITIAN, REGISTERED

## 2020-02-14 PROCEDURE — 99412 PR PREVENT COUNSEL,GROUP,60 MIN: ICD-10-PCS | Mod: S$GLB,,, | Performed by: DIETITIAN, REGISTERED

## 2020-02-17 ENCOUNTER — TELEPHONE (OUTPATIENT)
Dept: CARDIOLOGY | Facility: CLINIC | Age: 55
End: 2020-02-17

## 2020-02-21 ENCOUNTER — LAB VISIT (OUTPATIENT)
Dept: LAB | Facility: HOSPITAL | Age: 55
End: 2020-02-21
Payer: COMMERCIAL

## 2020-02-21 ENCOUNTER — OFFICE VISIT (OUTPATIENT)
Dept: CARDIOLOGY | Facility: CLINIC | Age: 55
End: 2020-02-21
Payer: COMMERCIAL

## 2020-02-21 ENCOUNTER — CLINICAL SUPPORT (OUTPATIENT)
Dept: INTERNAL MEDICINE | Facility: CLINIC | Age: 55
End: 2020-02-21
Payer: COMMERCIAL

## 2020-02-21 VITALS
BODY MASS INDEX: 48.82 KG/M2 | HEIGHT: 65 IN | SYSTOLIC BLOOD PRESSURE: 127 MMHG | DIASTOLIC BLOOD PRESSURE: 57 MMHG | HEART RATE: 43 BPM | WEIGHT: 293 LBS

## 2020-02-21 DIAGNOSIS — R00.1 BRADYCARDIA: ICD-10-CM

## 2020-02-21 DIAGNOSIS — Z00.00 ROUTINE GENERAL MEDICAL EXAMINATION AT A HEALTH CARE FACILITY: Primary | ICD-10-CM

## 2020-02-21 DIAGNOSIS — I50.22 CHRONIC SYSTOLIC HEART FAILURE: ICD-10-CM

## 2020-02-21 DIAGNOSIS — I10 ESSENTIAL HYPERTENSION: Primary | ICD-10-CM

## 2020-02-21 DIAGNOSIS — E66.01 MORBID OBESITY: ICD-10-CM

## 2020-02-21 DIAGNOSIS — I10 ESSENTIAL HYPERTENSION: ICD-10-CM

## 2020-02-21 DIAGNOSIS — I48.91 ATRIAL FIBRILLATION WITH RVR: Primary | ICD-10-CM

## 2020-02-21 DIAGNOSIS — G47.30 SLEEP APNEA, UNSPECIFIED TYPE: ICD-10-CM

## 2020-02-21 LAB
ALBUMIN SERPL BCP-MCNC: 4.1 G/DL (ref 3.5–5.2)
ALP SERPL-CCNC: 97 U/L (ref 55–135)
ALT SERPL W/O P-5'-P-CCNC: 27 U/L (ref 10–44)
ANION GAP SERPL CALC-SCNC: 10 MMOL/L (ref 8–16)
AST SERPL-CCNC: 43 U/L (ref 10–40)
BILIRUB SERPL-MCNC: 0.4 MG/DL (ref 0.1–1)
BUN SERPL-MCNC: 20 MG/DL (ref 6–20)
CALCIUM SERPL-MCNC: 10.2 MG/DL (ref 8.7–10.5)
CHLORIDE SERPL-SCNC: 99 MMOL/L (ref 95–110)
CHOLEST SERPL-MCNC: 209 MG/DL (ref 120–199)
CHOLEST/HDLC SERPL: 4.9 {RATIO} (ref 2–5)
CO2 SERPL-SCNC: 27 MMOL/L (ref 23–29)
CREAT SERPL-MCNC: 1 MG/DL (ref 0.5–1.4)
EST. GFR  (AFRICAN AMERICAN): >60 ML/MIN/1.73 M^2
EST. GFR  (NON AFRICAN AMERICAN): >60 ML/MIN/1.73 M^2
ESTIMATED AVG GLUCOSE: 157 MG/DL (ref 68–131)
GLUCOSE SERPL-MCNC: 104 MG/DL (ref 70–110)
HBA1C MFR BLD HPLC: 7.1 % (ref 4–5.6)
HDLC SERPL-MCNC: 43 MG/DL (ref 40–75)
HDLC SERPL: 20.6 % (ref 20–50)
LDLC SERPL CALC-MCNC: 127.8 MG/DL (ref 63–159)
NONHDLC SERPL-MCNC: 166 MG/DL
POTASSIUM SERPL-SCNC: 4.1 MMOL/L (ref 3.5–5.1)
PROT SERPL-MCNC: 8.2 G/DL (ref 6–8.4)
SODIUM SERPL-SCNC: 136 MMOL/L (ref 136–145)
TRIGL SERPL-MCNC: 191 MG/DL (ref 30–150)

## 2020-02-21 PROCEDURE — 3074F PR MOST RECENT SYSTOLIC BLOOD PRESSURE < 130 MM HG: ICD-10-PCS | Mod: CPTII,S$GLB,, | Performed by: STUDENT IN AN ORGANIZED HEALTH CARE EDUCATION/TRAINING PROGRAM

## 2020-02-21 PROCEDURE — 3078F DIAST BP <80 MM HG: CPT | Mod: CPTII,S$GLB,, | Performed by: STUDENT IN AN ORGANIZED HEALTH CARE EDUCATION/TRAINING PROGRAM

## 2020-02-21 PROCEDURE — 99203 PR OFFICE/OUTPT VISIT, NEW, LEVL III, 30-44 MIN: ICD-10-PCS | Mod: S$GLB,,, | Performed by: STUDENT IN AN ORGANIZED HEALTH CARE EDUCATION/TRAINING PROGRAM

## 2020-02-21 PROCEDURE — 99999 PR PBB SHADOW E&M-EST. PATIENT-LVL IV: ICD-10-PCS | Mod: PBBFAC,,, | Performed by: STUDENT IN AN ORGANIZED HEALTH CARE EDUCATION/TRAINING PROGRAM

## 2020-02-21 PROCEDURE — 93000 ELECTROCARDIOGRAM COMPLETE: CPT | Mod: S$GLB,,, | Performed by: INTERNAL MEDICINE

## 2020-02-21 PROCEDURE — 80061 LIPID PANEL: CPT

## 2020-02-21 PROCEDURE — 3008F PR BODY MASS INDEX (BMI) DOCUMENTED: ICD-10-PCS | Mod: CPTII,S$GLB,, | Performed by: STUDENT IN AN ORGANIZED HEALTH CARE EDUCATION/TRAINING PROGRAM

## 2020-02-21 PROCEDURE — 99203 OFFICE O/P NEW LOW 30 MIN: CPT | Mod: S$GLB,,, | Performed by: STUDENT IN AN ORGANIZED HEALTH CARE EDUCATION/TRAINING PROGRAM

## 2020-02-21 PROCEDURE — 99412 PR PREVENT COUNSEL,GROUP,60 MIN: ICD-10-PCS | Mod: S$GLB,,, | Performed by: DIETITIAN, REGISTERED

## 2020-02-21 PROCEDURE — 83036 HEMOGLOBIN GLYCOSYLATED A1C: CPT

## 2020-02-21 PROCEDURE — 36415 COLL VENOUS BLD VENIPUNCTURE: CPT

## 2020-02-21 PROCEDURE — 3008F BODY MASS INDEX DOCD: CPT | Mod: CPTII,S$GLB,, | Performed by: STUDENT IN AN ORGANIZED HEALTH CARE EDUCATION/TRAINING PROGRAM

## 2020-02-21 PROCEDURE — 3078F PR MOST RECENT DIASTOLIC BLOOD PRESSURE < 80 MM HG: ICD-10-PCS | Mod: CPTII,S$GLB,, | Performed by: STUDENT IN AN ORGANIZED HEALTH CARE EDUCATION/TRAINING PROGRAM

## 2020-02-21 PROCEDURE — 3074F SYST BP LT 130 MM HG: CPT | Mod: CPTII,S$GLB,, | Performed by: STUDENT IN AN ORGANIZED HEALTH CARE EDUCATION/TRAINING PROGRAM

## 2020-02-21 PROCEDURE — 99412 PREVENTIVE COUNSELING GROUP: CPT | Mod: S$GLB,,, | Performed by: DIETITIAN, REGISTERED

## 2020-02-21 PROCEDURE — 93000 EKG 12-LEAD: ICD-10-PCS | Mod: S$GLB,,, | Performed by: INTERNAL MEDICINE

## 2020-02-21 PROCEDURE — 80053 COMPREHEN METABOLIC PANEL: CPT

## 2020-02-21 PROCEDURE — 99999 PR PBB SHADOW E&M-EST. PATIENT-LVL IV: CPT | Mod: PBBFAC,,, | Performed by: STUDENT IN AN ORGANIZED HEALTH CARE EDUCATION/TRAINING PROGRAM

## 2020-02-22 PROBLEM — I50.22 CHRONIC SYSTOLIC HEART FAILURE: Status: ACTIVE | Noted: 2020-01-28

## 2020-02-22 PROBLEM — I48.91 ATRIAL FIBRILLATION WITH RVR: Status: RESOLVED | Noted: 2020-01-28 | Resolved: 2020-02-22

## 2020-02-22 NOTE — PROGRESS NOTES
Cardiology Clinic Note  Reason for Visit: Hospital Follow up  Date of Visit: 02/20/20    HPI:     Vicky Alvarado is a 55 y/o F with depression, HTN who presents for hospital follow up. She was admitted 1/28-1/30 for progressively worsening SOB and palpitations. She was diagnosed with and treated for ADHF and AFib with RVR with diruesis and DEMI+DCCV on 1/29/20, respectively. TTE noted EF 40% with hypokinesis of all walls except septal. Post-discharge she denies any chest pain, shortness of breath at rest or with exertion. She also denies any palpitations.     She has started participating in a 12-week weight loss course by [facility] and making lifestyle changes to her diet which has resulted in approx ~ 20 lbs weight loss since program started. While she does report some abdominal swelling/fullness, she denies any leg edema, orthopnea, PND. She does snore at night and has not been tested for sleep apnea in the past.     She reports a strong family history of premaure CAD including 4v CABG in mother at 42 years of age (who was a smoker) and MI in her sister at 40s.     ROS:    Constitution: Negative for fever or chills. Negative for weight loss or gain.   HENT: Negative for sore throat or headaches. Negative for rhinorrhea.  Eyes: Negative for blurred or double vision.   Cardiovascular: See above  Pulmonary: Negative for SOB. Negative for cough.   Gastrointestinal: Negative for abdominal pain. Negative for nausea/ vomiting. Negative for diarrhea.   : Negative for dysuria.   Neurological: Negative for focal weakness or sensory changes.  PMH:     Past Medical History:   Diagnosis Date    Depression     Hx of psychiatric care     Psychiatric problem      Past Surgical History:   Procedure Laterality Date    TREATMENT OF CARDIAC ARRHYTHMIA N/A 1/29/2020    Procedure: CARDIOVERSION;  Surgeon: Gabriel Hawley MD;  Location: [facility] EP LAB;  Service: Cardiology;  Laterality: N/A;  af, demi, dccv, destiny ibarra, 345      Allergies:   Review of patient's allergies indicates:  No Known Allergies  Medications:     Current Outpatient Medications on File Prior to Visit   Medication Sig Dispense Refill    apixaban (ELIQUIS) 5 mg Tab Take 1 tablet (5 mg total) by mouth 2 (two) times daily. 60 tablet 3    cyanocobalamin (VITAMIN B-12) 1000 MCG tablet Take 100 mcg by mouth once daily.       DULoxetine (CYMBALTA) 60 MG capsule Take 2 capsules (120 mg total) by mouth once daily. 180 capsule 3    gluc-shikha-msm#9-U-wqra-reymundo-bor 750 mg-644 mg- 30 mg-1 mg Tab Take 1 tablet by mouth once daily.       hydroCHLOROthiazide (HYDRODIURIL) 50 MG tablet Take 1 tablet (50 mg total) by mouth once daily. 90 tablet 5    lisinopril (PRINIVIL,ZESTRIL) 40 MG tablet Take 1 tablet (40 mg total) by mouth once daily. 90 tablet 5    MELATONIN ORAL Take 1-2 tablets by mouth nightly as needed (Sleep).      metoprolol succinate (TOPROL-XL) 50 MG 24 hr tablet Take 2 tablets (100 mg total) by mouth once daily. 30 tablet 5    multivitamin (THERAGRAN) per tablet Take 1 tablet by mouth once daily.      omega-3 fatty acids-vitamin E 1,000 mg Cap       omeprazole (PRILOSEC) 20 MG capsule TAKE 1 CAPSULE BY MOUTH ONCE DAILY 90 capsule 3    traZODone (DESYREL) 100 MG tablet Take 1 tablet (100 mg total) by mouth nightly as needed for Insomnia. 90 tablet 3     No current facility-administered medications on file prior to visit.      Social History:     Social History     Tobacco Use    Smoking status: Former Smoker     Types: Cigarettes     Last attempt to quit: 2019     Years since quittin.6    Smokeless tobacco: Never Used   Substance Use Topics    Alcohol use: No     Family History:     Family History   Problem Relation Age of Onset    Cataracts Mother     Hypertension Mother     Thyroid disease Mother     Diabetes Father     Hypertension Father     Hypertension Sister     Hypertension Brother     Amblyopia Neg Hx     Blindness Neg Hx      "Cancer Neg Hx     Glaucoma Neg Hx     Macular degeneration Neg Hx     Retinal detachment Neg Hx     Strabismus Neg Hx     Stroke Neg Hx      Physical Exam:   BP (!) 127/57 (BP Location: Left arm, Patient Position: Sitting, BP Method: X-Large (Automatic))   Pulse (!) 43   Ht 5' 5" (1.651 m)   Wt (!) 136.1 kg (300 lb 0.7 oz)   LMP 06/08/2015   BMI 49.93 kg/m²      Constitutional: No acute distress, conversant  HEENT: Sclera anicteric, extraocular motions intact, Oropharynx clear  Neck: No JVD, no carotid bruits  Cardiovascular: regular rate and rhythm, no murmur, rubs or gallops, normal S1/S2  Pulmonary: Clear to auscultation bilaterally  Abdominal: Abdomen soft, nontender, nondistended, positive bowel sounds  Extremities: No lower extremity edema,   Pulses: 2+ BL Radial, 2+ BL carotid, 2+ BL DP, 2+ BL PT  Skin: No ecchymosis, erythema, or ulcers  Psych: Alert and oriented x 3, appropriate affect  Neuro: CNII-XII intact, no focal deficits    Labs:     Lab Results   Component Value Date     02/21/2020    K 4.1 02/21/2020    CL 99 02/21/2020    CO2 27 02/21/2020    BUN 20 02/21/2020    CREATININE 1.0 02/21/2020    ANIONGAP 10 02/21/2020     Lab Results   Component Value Date    AST 43 (H) 02/21/2020    ALT 27 02/21/2020    ALKPHOS 97 02/21/2020    BILITOT 0.4 02/21/2020    ALBUMIN 4.1 02/21/2020     Lab Results   Component Value Date    CALCIUM 10.2 02/21/2020    MG 1.9 01/30/2020     Lab Results   Component Value Date     (H) 01/28/2020    Lab Results   Component Value Date    WBC 6.72 01/30/2020    HGB 12.0 01/30/2020    HCT 42.5 01/30/2020    HCT 40 01/28/2020     01/30/2020    GRAN 3.6 01/30/2020    GRAN 53.7 01/30/2020     Lab Results   Component Value Date    INR 1.1 01/28/2020     Lab Results   Component Value Date    CHOL 209 (H) 02/21/2020    HDL 43 02/21/2020    LDLCALC 127.8 02/21/2020    TRIG 191 (H) 02/21/2020     Lab Results   Component Value Date    HGBA1C 7.1 (H) 02/21/2020 "     Lab Results   Component Value Date    TSH 1.225 01/28/2020          Imaging:   EKG 2/22/20: Normal sinus rhythm, normal ECG    TTE 1/28/29: estimated EF 40%, global hypokinesis, akinesis of septum    Assessment:     1. Essential hypertension  Comprehensive metabolic panel    Lipid panel    Hemoglobin A1c   2. Morbid obesity  CANCELED: Polysomnogram (CPAP will be added if patient meets diagnostic criteria.)   3. Bradycardia  EKG 12-lead       Plan:     1. Hypertension - well controlled  - continue HCTZ 50mg daily, lisinopril 40mg daily, metoprolol 100mg daily    2. Morbid obesity  - encouraged continued participation in 12-week course, diet changes and walking/exercise   - referral for sleep study    3. Heart failure with reduced ejection fraction (EF 40%)  - PET stress ordered for ischemic evaluation  - compensated/euvolemic at this time  - lipid panel, CMP, A1c ordered for risk stratification for CAD given patient's risk factors and family history.     4. Bradycardia   - on exam, pt appeared to have a slow HR but ECG suggestive of normal sinus rhythm, normal ECG with HR 77 bpm. Pt not in afib.    RTC In 3 months or earlier, as needed.     Patient seen and plan of care discussed with Dr. Wallace    Signed:  Irina Cantu MD  Cardiology Fellow PGY 4  Beeper:  [phone]

## 2020-02-26 PROBLEM — I42.9 CARDIOMYOPATHY: Status: ACTIVE | Noted: 2020-02-26

## 2020-02-26 PROBLEM — I48.0 PAF (PAROXYSMAL ATRIAL FIBRILLATION): Status: ACTIVE | Noted: 2020-02-26

## 2020-02-26 NOTE — PROGRESS NOTES
Ms. Alvarado is a patient of Dr. Hawley      Subjective:   Patient ID:  Vicky Alvarado is a 54 y.o. female who presents for follow-up of Atrial Fibrillation  .     HPI:    Ms. Alvarado is a 54 y.o. female with MVP, HTN, MDD, opioid dependence hx, AF here for hospital follow up.     Background:    Vicky Alvarado is a 53 yo F with PMHx MVP, HTN, depression, hx opioid dependence. She was admitted - for progressively worsening SOB and palpitations. She was diagnosed with and treated for ADHF and AFib with RVR with diruesis and REENA+DCCV on 20, respectively. TTE noted EF 40% with hypokinesis of all walls except septal.    Update (2020):    DCCV 2020. EF on REENA 40%. PET stress ordered.    Today she reports that she had noticed increasing SOB for about a week before her hospitalization. She did feel palpitations during that time. She knew she was in AF, but has not been in AF previously. Has had PVCs for years and is used to irregular HB. She says she continues to have palpitations that she believes are PVCs. She is a nurse. Denies exertional CP, worsening NAPIER, LH, syncope.    Mom  of cardiac arrest (had a history of CAD).     Patient denies hx of CVA, MI, thyroid. TSH in hospital WNL.  She reports hx of PVCs for years.  She says she had an echo and SPECT 10 yrs ago at another facility. Stress test negative and EF was reportedly normal.  Per cardiology, she has started participating in a 12-week weight loss course by [facility] and making lifestyle changes to her diet which has resulted in approx ~ 20 lbs weight loss since program started.    She is currently taking eliquis 5mg BID for stroke prophylaxis and denies significant bleeding episodes. She is currently being treated with metoprolol succinate 100mg daily for HR control.  Kidney function is stable, with a creatinine of 1 on 2020.    I have personally reviewed the patient's EKG today, which shows sinus rhythm at 64bpm. CO  interval is 156. QRS is 88. QTc is 406.    Recent Cardiac Tests:    REENA (1/29/2020):  · Atrial fibrillation observed.  · REENA performed to assess the EMILIANO prior to electrical cardioversion. Normal appearing left atrial appendage. No thrombus is present in the appendage which was confirmed with contrast enhancement. Abnormal appendage velocities 0.3 m/s.  · Mildly decreased left ventricular systolic function. The estimated ejection fraction is 40%.  · Normal right ventricular systolic function.  · Mild-to-moderate mitral regurgitation.  · Mild mitral sclerosis.  · Mild tricuspid regurgitation.    Current Outpatient Medications   Medication Sig    apixaban (ELIQUIS) 5 mg Tab Take 1 tablet (5 mg total) by mouth 2 (two) times daily.    cyanocobalamin (VITAMIN B-12) 1000 MCG tablet Take 100 mcg by mouth once daily.     DULoxetine (CYMBALTA) 60 MG capsule Take 2 capsules (120 mg total) by mouth once daily.    gluc-shikha-[facility]#2-W-uzki-reymundo-bor 750 mg-644 mg- 30 mg-1 mg Tab Take 1 tablet by mouth once daily.     hydroCHLOROthiazide (HYDRODIURIL) 50 MG tablet Take 1 tablet (50 mg total) by mouth once daily.    lisinopril (PRINIVIL,ZESTRIL) 40 MG tablet Take 1 tablet (40 mg total) by mouth once daily.    MELATONIN ORAL Take 1-2 tablets by mouth nightly as needed (Sleep).    metoprolol succinate (TOPROL-XL) 50 MG 24 hr tablet Take 2 tablets (100 mg total) by mouth once daily.    multivitamin (THERAGRAN) per tablet Take 1 tablet by mouth once daily.    omega-3 fatty acids-vitamin E 1,000 mg Cap     omeprazole (PRILOSEC) 20 MG capsule TAKE 1 CAPSULE BY MOUTH ONCE DAILY    traZODone (DESYREL) 100 MG tablet Take 1 tablet (100 mg total) by mouth nightly as needed for Insomnia.     No current facility-administered medications for this visit.      Review of Systems   Constitution: Negative for malaise/fatigue.   Cardiovascular: Positive for palpitations. Negative for chest pain, dyspnea on exertion, irregular heartbeat and leg  "swelling.   Respiratory: Negative for shortness of breath.    Hematologic/Lymphatic: Negative for bleeding problem.   Skin: Negative for rash.   Musculoskeletal: Negative for myalgias.   Gastrointestinal: Negative for hematemesis, hematochezia and nausea.   Genitourinary: Negative for hematuria.   Neurological: Negative for light-headedness.   Psychiatric/Behavioral: Negative for altered mental status.   Allergic/Immunologic: Negative for persistent infections.     Objective:        BP (!) 100/58   Pulse 64   Ht 5' 6" (1.676 m)   Wt 131.5 kg (290 lb)   LMP 06/08/2015   BMI 46.81 kg/m²     Physical Exam   Constitutional: She is oriented to person, place, and time. She appears well-developed and well-nourished.   HENT:   Head: Normocephalic.   Nose: Nose normal.   Eyes: Pupils are equal, round, and reactive to light.   Cardiovascular: Normal rate, regular rhythm, S1 normal and S2 normal.   No murmur heard.  Pulses:       Radial pulses are 2+ on the right side, and 2+ on the left side.   Pulmonary/Chest: Breath sounds normal. No respiratory distress.   Abdominal: Normal appearance.   Musculoskeletal: Normal range of motion. She exhibits no edema.   Neurological: She is alert and oriented to person, place, and time.   Skin: Skin is warm and dry. No erythema.   Psychiatric: She has a normal mood and affect. Her speech is normal and behavior is normal.   Nursing note and vitals reviewed.    Lab Results   Component Value Date     02/21/2020    K 4.1 02/21/2020    MG 1.9 01/30/2020    BUN 20 02/21/2020    CREATININE 1.0 02/21/2020    ALT 27 02/21/2020    AST 43 (H) 02/21/2020    HGB 12.0 01/30/2020    HCT 42.5 01/30/2020    HCT 40 01/28/2020    TSH 1.225 01/28/2020    LDLCALC 127.8 02/21/2020       Recent Labs   Lab 01/28/20  0531   INR 1.1       Assessment:     1. PAF (paroxysmal atrial fibrillation)    2. Cardiomyopathy, unspecified type    3. Essential hypertension      Plan:     In summary, Ms. Alvarado is a " 54 y.o. female with MVP, HTN, MDD, opioid dependence hx, AF here for hospital follow up.   She is one month s/p DCCV for new onset atrial fibrillation. I discussed atrial fibrillation and its basic pathophysiology, including the health implications and treatment options with Vicky Alvarado. Specifically, I addressed the need for CVA prophylaxis as well as the goal to reduce symptomatic arrhythmic episodes by pharmacologic and/or procedural methods. We discussed options for anticoagulation, including coumadin vs dabigatran/rivaroxaban/apixaban. Discussed risks and benefits of each, including dosing, side effects, risk, and cost. Answered all questions. Patient verbalized understanding. CHADSVASc 2 and OAC is recommended. She is on eliquis for CVA prophylaxis. In SR today but reports episodes of palpitations. Also reports history of PVCs. Will order event monitor to evaluate. Sleep consult has been ordered by general cardiology. Patient in wellness program and losing weight.  REENA at the time of cardioversion showed EF of 40%. She says previous echo 10 yrs ago was normal. PET stress ordered for ischemic evaluation is pending today. Possible arrhythmia-induced. Will repeat echo prior to next clinic visit.    Event monitor now.  Continue current medications.  RTC in 2-3 mo with echo, sooner if needed.    *A copy of this note has been sent to Dr. Hawley*    Follow up in about 10 weeks (around 5/12/2020).    ------------------------------------------------------------------    SCOUT Payton, NP-C  Cardiac Electrophysiology

## 2020-02-28 ENCOUNTER — CLINICAL SUPPORT (OUTPATIENT)
Dept: INTERNAL MEDICINE | Facility: CLINIC | Age: 55
End: 2020-02-28
Payer: COMMERCIAL

## 2020-02-28 ENCOUNTER — TELEPHONE (OUTPATIENT)
Dept: CARDIOLOGY | Facility: CLINIC | Age: 55
End: 2020-02-28

## 2020-02-28 DIAGNOSIS — Z00.00 ROUTINE GENERAL MEDICAL EXAMINATION AT A HEALTH CARE FACILITY: Primary | ICD-10-CM

## 2020-02-28 PROCEDURE — 99412 PREVENTIVE COUNSELING GROUP: CPT | Mod: S$GLB,,, | Performed by: DIETITIAN, REGISTERED

## 2020-02-28 PROCEDURE — 99412 PR PREVENT COUNSEL,GROUP,60 MIN: ICD-10-PCS | Mod: S$GLB,,, | Performed by: DIETITIAN, REGISTERED

## 2020-03-03 ENCOUNTER — CLINICAL SUPPORT (OUTPATIENT)
Dept: CARDIOLOGY | Facility: CLINIC | Age: 55
End: 2020-03-03
Attending: NURSE PRACTITIONER
Payer: COMMERCIAL

## 2020-03-03 ENCOUNTER — HOSPITAL ENCOUNTER (OUTPATIENT)
Dept: CARDIOLOGY | Facility: CLINIC | Age: 55
Discharge: HOME | End: 2020-03-03
Payer: COMMERCIAL

## 2020-03-03 ENCOUNTER — OFFICE VISIT (OUTPATIENT)
Dept: ELECTROPHYSIOLOGY | Facility: CLINIC | Age: 55
End: 2020-03-03
Payer: COMMERCIAL

## 2020-03-03 VITALS
SYSTOLIC BLOOD PRESSURE: 100 MMHG | BODY MASS INDEX: 46.61 KG/M2 | HEART RATE: 64 BPM | WEIGHT: 290 LBS | HEIGHT: 66 IN | DIASTOLIC BLOOD PRESSURE: 58 MMHG

## 2020-03-03 VITALS — HEART RATE: 63 BPM | SYSTOLIC BLOOD PRESSURE: 100 MMHG | DIASTOLIC BLOOD PRESSURE: 68 MMHG

## 2020-03-03 DIAGNOSIS — I48.91 ATRIAL FIBRILLATION WITH RVR: ICD-10-CM

## 2020-03-03 DIAGNOSIS — I50.23 ACUTE ON CHRONIC SYSTOLIC (CONGESTIVE) HEART FAILURE: ICD-10-CM

## 2020-03-03 DIAGNOSIS — I48.0 PAF (PAROXYSMAL ATRIAL FIBRILLATION): Primary | ICD-10-CM

## 2020-03-03 DIAGNOSIS — I10 ESSENTIAL HYPERTENSION: ICD-10-CM

## 2020-03-03 DIAGNOSIS — I48.91 ATRIAL FIBRILLATION, UNSPECIFIED TYPE: ICD-10-CM

## 2020-03-03 DIAGNOSIS — I42.9 CARDIOMYOPATHY, UNSPECIFIED TYPE: ICD-10-CM

## 2020-03-03 LAB
% MYOCARDIUM- DEFECT 1: 5 %
CFR FLOW - ANTERIOR: 1.91
CFR FLOW - INFERIOR: 2.09
CFR FLOW - LATERAL: 2
CFR FLOW - MAX: 3.06
CFR FLOW - MIN: 1.16
CFR FLOW - SEPTAL: 2.11
CFR FLOW - WHOLE HEART: 2.03
CFR FLOW- DEFECT 1: 1.51 CC/MIN/G
CFR FLOW- DEFECT 2: 2.39 CC/MIN/G
CV PHARM DOSE: 60 MG
CV STRESS BASE HR: 60 BPM
DIASTOLIC BLOOD PRESSURE: 60 MMHG
END DIASTOLIC INDEX-HIGH: 170 ML/M2
END SYSTOLIC INDEX-HIGH: 70 ML/M2
NUC REST DIASTOLIC VOLUME INDEX: 125
NUC REST EJECTION FRACTION: 78
NUC REST SYSTOLIC VOLUME INDEX: 28
NUC STRESS DIASTOLIC VOLUME INDEX: 126
NUC STRESS EJECTION FRACTION: 72 %
NUC STRESS SYSTOLIC VOLUME INDEX: 36
OHS CV CPX 85 PERCENT MAX PREDICTED HEART RATE MALE: 134
OHS CV CPX MAX PREDICTED HEART RATE: 158
OHS CV CPX PATIENT IS FEMALE: 1
OHS CV CPX PATIENT IS MALE: 0
OHS CV CPX PEAK DIASTOLIC BLOOD PRESSURE: 79 MMHG
OHS CV CPX PEAK HEAR RATE: 65 BPM
OHS CV CPX PEAK RATE PRESSURE PRODUCT: 6500
OHS CV CPX PEAK SYSTOLIC BLOOD PRESSURE: 100 MMHG
OHS CV CPX PERCENT MAX PREDICTED HEART RATE ACHIEVED: 41
OHS CV CPX RATE PRESSURE PRODUCT PRESENTING: 7620
PERFUSION DEFECT 2 SIZE IN %: 6 %
REST FLOW - ANTERIOR: 0.49 CC/MIN/G
REST FLOW - INFERIOR: 0.46 CC/MIN/G
REST FLOW - LATERAL: 0.51 CC/MIN/G
REST FLOW - MAX: 0.7 CC/MIN/G
REST FLOW - MIN: 0.3 CC/MIN/G
REST FLOW - SEPTAL: 0.41 CC/MIN/G
REST FLOW - WHOLE HEART: 0.47 CC/MIN/G
REST FLOW- DEFECT 1: 0.48 CC/MIN/G
REST FLOW- DEFECT 2: 0.32 CC/MIN/G
RETIRED EF AND QEF - SEE NOTES: 51 %
STRESS ECHO TARGET HR: 140 BPM
STRESS FLOW - ANTERIOR: 0.92 CC/MIN/G
STRESS FLOW - INFERIOR: 0.94 CC/MIN/G
STRESS FLOW - LATERAL: 1.01 CC/MIN/G
STRESS FLOW - MAX: 1.3 CC/MIN/G
STRESS FLOW - MIN: 0.7 CC/MIN/G
STRESS FLOW - SEPTAL: 0.85 CC/MIN/G
STRESS FLOW - WHOLE HEART: 0.93 CC/MIN/G
STRESS FLOW- DEFECT 1: 0.73 CC/MIN/G
STRESS FLOW- DEFECT 2: 0.76 CC/MIN/G
SYSTOLIC BLOOD PRESSURE: 127 MMHG

## 2020-03-03 PROCEDURE — A9555 RB82 RUBIDIUM: HCPCS | Mod: S$GLB,,, | Performed by: INTERNAL MEDICINE

## 2020-03-03 PROCEDURE — 78492 CARDIAC PET SCAN STRESS (CUPID ONLY): ICD-10-PCS | Mod: S$GLB,,, | Performed by: INTERNAL MEDICINE

## 2020-03-03 PROCEDURE — 99214 OFFICE O/P EST MOD 30 MIN: CPT | Mod: S$GLB,,, | Performed by: NURSE PRACTITIONER

## 2020-03-03 PROCEDURE — 3008F BODY MASS INDEX DOCD: CPT | Mod: CPTII,S$GLB,, | Performed by: NURSE PRACTITIONER

## 2020-03-03 PROCEDURE — 3008F PR BODY MASS INDEX (BMI) DOCUMENTED: ICD-10-PCS | Mod: CPTII,S$GLB,, | Performed by: NURSE PRACTITIONER

## 2020-03-03 PROCEDURE — 93010 RHYTHM STRIP: ICD-10-PCS | Mod: S$GLB,,, | Performed by: INTERNAL MEDICINE

## 2020-03-03 PROCEDURE — 3078F DIAST BP <80 MM HG: CPT | Mod: CPTII,S$GLB,, | Performed by: NURSE PRACTITIONER

## 2020-03-03 PROCEDURE — 3078F PR MOST RECENT DIASTOLIC BLOOD PRESSURE < 80 MM HG: ICD-10-PCS | Mod: CPTII,S$GLB,, | Performed by: NURSE PRACTITIONER

## 2020-03-03 PROCEDURE — 99999 PR PBB SHADOW E&M-EST. PATIENT-LVL III: CPT | Mod: PBBFAC,,, | Performed by: NURSE PRACTITIONER

## 2020-03-03 PROCEDURE — 3074F SYST BP LT 130 MM HG: CPT | Mod: CPTII,S$GLB,, | Performed by: NURSE PRACTITIONER

## 2020-03-03 PROCEDURE — 99999 PR PBB SHADOW E&M-EST. PATIENT-LVL II: CPT | Mod: PBBFAC,,,

## 2020-03-03 PROCEDURE — 78492 MYOCRD IMG PET MLT RST&STRS: CPT | Mod: S$GLB,,, | Performed by: INTERNAL MEDICINE

## 2020-03-03 PROCEDURE — 93010 ELECTROCARDIOGRAM REPORT: CPT | Mod: S$GLB,,, | Performed by: INTERNAL MEDICINE

## 2020-03-03 PROCEDURE — 93015 CARDIAC PET SCAN STRESS (CUPID ONLY): ICD-10-PCS | Mod: S$GLB,,, | Performed by: INTERNAL MEDICINE

## 2020-03-03 PROCEDURE — A9555 CARDIAC PET SCAN STRESS (CUPID ONLY): ICD-10-PCS | Mod: S$GLB,,, | Performed by: INTERNAL MEDICINE

## 2020-03-03 PROCEDURE — 99214 PR OFFICE/OUTPT VISIT, EST, LEVL IV, 30-39 MIN: ICD-10-PCS | Mod: S$GLB,,, | Performed by: NURSE PRACTITIONER

## 2020-03-03 PROCEDURE — 93005 ELECTROCARDIOGRAM TRACING: CPT | Mod: S$GLB,,, | Performed by: INTERNAL MEDICINE

## 2020-03-03 PROCEDURE — 99999 PR PBB SHADOW E&M-EST. PATIENT-LVL III: ICD-10-PCS | Mod: PBBFAC,,, | Performed by: NURSE PRACTITIONER

## 2020-03-03 PROCEDURE — 3074F PR MOST RECENT SYSTOLIC BLOOD PRESSURE < 130 MM HG: ICD-10-PCS | Mod: CPTII,S$GLB,, | Performed by: NURSE PRACTITIONER

## 2020-03-03 PROCEDURE — 93005 RHYTHM STRIP: ICD-10-PCS | Mod: S$GLB,,, | Performed by: INTERNAL MEDICINE

## 2020-03-03 PROCEDURE — 93015 CV STRESS TEST SUPVJ I&R: CPT | Mod: S$GLB,,, | Performed by: INTERNAL MEDICINE

## 2020-03-03 PROCEDURE — 99999 PR PBB SHADOW E&M-EST. PATIENT-LVL II: ICD-10-PCS | Mod: PBBFAC,,,

## 2020-03-03 RX ORDER — DIPYRIDAMOLE 5 MG/ML
60 INJECTION INTRAVENOUS
Status: COMPLETED | OUTPATIENT
Start: 2020-03-03 | End: 2020-03-03

## 2020-03-03 RX ADMIN — DIPYRIDAMOLE 60 MG: 5 INJECTION INTRAVENOUS at 09:03

## 2020-03-03 NOTE — PATIENT INSTRUCTIONS
Event monitor now.  Echo and return to clinic in 2-3 months.     Understanding Atrial Fibrillation    An arrhythmia is any problem with the speed or pattern of the heartbeat. Atrial fibrillation (AFib) is a common type of arrhythmia. It causes fast, chaotic electrical signals in the atria. This leads to poor functioning of the heart. It also affects how much blood your heart can pump out to the body.  Afib may occur once in a while and go away on its own. Or it may continue for longer periods and need treatment.  AFib can lead to serious problems, such as stroke. Your healthcare provider will need to monitor and manage it.  What happens during atrial fibrillation?   The heart has an electrical system that sends signals to control the heartbeat. As signals move through the heart, they tell the hearts upper chambers (atria) and lower chambers (ventricles) when to squeeze (contract) and relax. This lets blood move through the heart and out to the body and lungs.  With AFib, the atria receive abnormal signals. This causes them to contract in a fast and irregular way, and out of sync with the ventricles. When this happens, the atria also have a harder time moving blood into the ventricles. Blood may then pool in the atria, which increases the risk for blood clots and stroke. The ventricles also may contract too quickly and irregularly. As a result, they may not pump blood to the body and lungs as well as they should. This can weaken the heart muscle over time and cause heart failure.  What causes atrial fibrillation?  AFib is more common in older adults. It has many possible causes including:  · Coronary artery disease  · Heart valve disease  · Heart attack  · Heart surgery  · High blood pressure  · Thyroid disease  · Diabetes  · Lung disease  · Sleep apnea  · Heavy alcohol use  In some cases of AFib, doctors do not know the cause.  What are the symptoms of atrial fibrillation?  AFib may or may not cause symptoms. If  symptoms do occur, they may include:  · A fast, pounding, irregular heartbeat  · Shortness of breath  · Tiredness  · Dizziness or fainting  · Chest pain  How is atrial fibrillation treated?  Treatments for AFib can include any of the options below.  · Medicines. You may be prescribed:  ¨ Heart rate medicines to help slow down the heartbeat  ¨ Heart rhythm medicines to help the heart beat more regularly  ¨ Anti-clotting medicines to help reduce the risk for blood clots and stroke  · Electrical cardioversion. Your healthcare provider uses special pads or paddles to send one or more brief electrical shocks to the heart. This can help reset the heartbeat to normal.  · Ablation. Long, thin tubes called catheters are threaded through a blood vessel to the heart. There, the catheters send out hot or cold energy to the areas causing the abnormal signals. This energy destroys the problem tissue or cells. This improves the chances that your heart will stay in normal rhythm without using medicines. If your heart rate and rhythm cant be controlled, you may need ablation and a pacemaker. These will help control the heart rate and regularity of the heartbeat.  · Surgery. During surgery, your healthcare provider may use different methods to create scar tissue in the areas of the heart causing the abnormal signals. The scar tissue disrupts the abnormal signals and may stop AFib from occurring.  What are the complications of atrial fibrillation?  These can include:  · Blood clots  · Stroke  · Heart failure. This problem occurs when the heart muscle weakens so much that it can no longer pump blood well.  When should I call my healthcare provider?  Call your healthcare provider right away if you have any of these:  · Symptoms that dont get better with treatment, or get worse  · New symptoms   Date Last Reviewed: 5/1/2016  © 5088-1152 [employer]. [address] [place] [address], [place] [address]. All rights reserved. This  information is not intended as a substitute for professional medical care. Always follow your healthcare professional's instructions.

## 2020-03-05 ENCOUNTER — CLINICAL SUPPORT (OUTPATIENT)
Dept: CARDIOLOGY | Facility: HOSPITAL | Age: 55
End: 2020-03-05
Attending: NURSE PRACTITIONER
Payer: COMMERCIAL

## 2020-03-05 ENCOUNTER — PATIENT MESSAGE (OUTPATIENT)
Dept: ELECTROPHYSIOLOGY | Facility: CLINIC | Age: 55
End: 2020-03-05

## 2020-03-05 DIAGNOSIS — I48.0 PAF (PAROXYSMAL ATRIAL FIBRILLATION): ICD-10-CM

## 2020-03-05 PROCEDURE — 93272 CARDIAC EVENT MONITOR (CUPID ONLY): ICD-10-PCS | Mod: ,,, | Performed by: INTERNAL MEDICINE

## 2020-03-05 PROCEDURE — 93272 ECG/REVIEW INTERPRET ONLY: CPT | Mod: ,,, | Performed by: INTERNAL MEDICINE

## 2020-03-05 PROCEDURE — 93271 ECG/MONITORING AND ANALYSIS: CPT

## 2020-03-05 RX ORDER — METOPROLOL SUCCINATE 50 MG/1
100 TABLET, EXTENDED RELEASE ORAL DAILY
Qty: 30 TABLET | Refills: 5 | Status: SHIPPED | OUTPATIENT
Start: 2020-03-05 | End: 2020-07-13 | Stop reason: SDUPTHER

## 2020-03-10 ENCOUNTER — PATIENT MESSAGE (OUTPATIENT)
Dept: CARDIOLOGY | Facility: CLINIC | Age: 55
End: 2020-03-10

## 2020-03-10 ENCOUNTER — TELEPHONE (OUTPATIENT)
Dept: CARDIOLOGY | Facility: HOSPITAL | Age: 55
End: 2020-03-10

## 2020-03-11 NOTE — TELEPHONE ENCOUNTER
Attempted to call pt to relay messages of PET stress. Will send via [facility].    Irina Cantu MD  Cardiology, PGY IV  Pager: [phone]

## 2020-03-13 ENCOUNTER — CLINICAL SUPPORT (OUTPATIENT)
Dept: INTERNAL MEDICINE | Facility: CLINIC | Age: 55
End: 2020-03-13
Payer: COMMERCIAL

## 2020-03-13 DIAGNOSIS — Z00.00 ROUTINE GENERAL MEDICAL EXAMINATION AT A HEALTH CARE FACILITY: Primary | ICD-10-CM

## 2020-03-13 PROCEDURE — 99412 PR PREVENT COUNSEL,GROUP,60 MIN: ICD-10-PCS | Mod: S$GLB,,, | Performed by: DIETITIAN, REGISTERED

## 2020-03-13 PROCEDURE — 99412 PREVENTIVE COUNSELING GROUP: CPT | Mod: S$GLB,,, | Performed by: DIETITIAN, REGISTERED

## 2020-03-18 ENCOUNTER — TELEPHONE (OUTPATIENT)
Dept: ORTHOPEDICS | Facility: CLINIC | Age: 55
End: 2020-03-18

## 2020-03-18 NOTE — TELEPHONE ENCOUNTER
Left message for pt to return my call. Need to reschedule visit with Echo Aguirre from 3/23 to later in April.

## 2020-03-19 ENCOUNTER — TELEPHONE (OUTPATIENT)
Dept: ORTHOPEDICS | Facility: CLINIC | Age: 55
End: 2020-03-19

## 2020-03-19 NOTE — TELEPHONE ENCOUNTER
Left patient a voicemail stating she needs to get xray we can set up a tele visit for her to give us a call back at the office.----- Message from Debra Sullivan sent at 3/19/2020  2:57 PM CDT -----  Would you mind contacting pt? We can do virtual visit but she will need to get XR (can do them at [facility] or at a location close to her home).    ET  ----- Message -----  From: Apryl Shields  Sent: 3/19/2020   1:54 PM CDT  To: Rafael WORRELL Staff    Type:  Patient Returning Call    Who Called: pt     Who Left Message for Patient: Sarah    Does the patient know what this is regarding?: r/s kunal    Would the patient rather a call back or a response via [facility] [employer]? Call back     Best Call Back Number: [phone]    Additional Information:  Pt asking to speak to staff. She states if she has to r/s appt with Dr. Aguirre she'd rather r/s with Dr. Hall to discuss surgery. But she states she does not think she can wait that long for relieves. She is asking for other options. She states her elbow and wrist hurts. She states she has tried a splint, bio-freeze, heat, ice, and cannot take ibuprofen b/c she is on blood thinners. She is asking if she can have a virtual visit or discuss with provider.

## 2020-03-20 ENCOUNTER — HOSPITAL ENCOUNTER (OUTPATIENT)
Dept: RADIOLOGY | Facility: HOSPITAL | Age: 55
Discharge: HOME | End: 2020-03-20
Attending: PHYSICIAN ASSISTANT
Payer: COMMERCIAL

## 2020-03-20 ENCOUNTER — CLINICAL SUPPORT (OUTPATIENT)
Dept: INTERNAL MEDICINE | Facility: CLINIC | Age: 55
End: 2020-03-20
Payer: COMMERCIAL

## 2020-03-20 ENCOUNTER — PATIENT MESSAGE (OUTPATIENT)
Dept: ELECTROPHYSIOLOGY | Facility: CLINIC | Age: 55
End: 2020-03-20

## 2020-03-20 DIAGNOSIS — R52 PAIN: ICD-10-CM

## 2020-03-20 DIAGNOSIS — R52 PAIN: Primary | ICD-10-CM

## 2020-03-20 DIAGNOSIS — Z00.00 ROUTINE GENERAL MEDICAL EXAMINATION AT A HEALTH CARE FACILITY: Primary | ICD-10-CM

## 2020-03-20 PROCEDURE — 73080 X-RAY EXAM OF ELBOW: CPT | Mod: 26,RT,, | Performed by: RADIOLOGY

## 2020-03-20 PROCEDURE — 73080 XR ELBOW COMPLETE 3 VIEW RIGHT: ICD-10-PCS | Mod: 26,RT,, | Performed by: RADIOLOGY

## 2020-03-20 PROCEDURE — 73110 X-RAY EXAM OF WRIST: CPT | Mod: 26,RT,, | Performed by: RADIOLOGY

## 2020-03-20 PROCEDURE — 73110 X-RAY EXAM OF WRIST: CPT | Mod: TC,RT

## 2020-03-20 PROCEDURE — 73110 XR WRIST COMPLETE 3 VIEWS RIGHT: ICD-10-PCS | Mod: 26,RT,, | Performed by: RADIOLOGY

## 2020-03-20 PROCEDURE — 99412 PREVENTIVE COUNSELING GROUP: CPT | Mod: S$GLB,,, | Performed by: DIETITIAN, REGISTERED

## 2020-03-20 PROCEDURE — 99412 PR PREVENT COUNSEL,GROUP,60 MIN: ICD-10-PCS | Mod: S$GLB,,, | Performed by: DIETITIAN, REGISTERED

## 2020-03-20 PROCEDURE — 73080 X-RAY EXAM OF ELBOW: CPT | Mod: TC,RT

## 2020-03-23 ENCOUNTER — TELEPHONE (OUTPATIENT)
Dept: ORTHOPEDICS | Facility: CLINIC | Age: 55
End: 2020-03-23

## 2020-03-23 NOTE — TELEPHONE ENCOUNTER
Spoke to patient per Echo she said the system was down for the tele visit.Patient agreed to try again.

## 2020-03-24 ENCOUNTER — PATIENT MESSAGE (OUTPATIENT)
Dept: CARDIOLOGY | Facility: CLINIC | Age: 55
End: 2020-03-24

## 2020-03-25 ENCOUNTER — PATIENT MESSAGE (OUTPATIENT)
Dept: ELECTROPHYSIOLOGY | Facility: CLINIC | Age: 55
End: 2020-03-25

## 2020-03-27 ENCOUNTER — CLINICAL SUPPORT (OUTPATIENT)
Dept: INTERNAL MEDICINE | Facility: CLINIC | Age: 55
End: 2020-03-27
Payer: COMMERCIAL

## 2020-03-27 DIAGNOSIS — Z00.00 ROUTINE GENERAL MEDICAL EXAMINATION AT A HEALTH CARE FACILITY: Primary | ICD-10-CM

## 2020-03-27 PROCEDURE — 99412 PREVENTIVE COUNSELING GROUP: CPT | Mod: S$GLB,,, | Performed by: DIETITIAN, REGISTERED

## 2020-03-27 PROCEDURE — 99412 PR PREVENT COUNSEL,GROUP,60 MIN: ICD-10-PCS | Mod: S$GLB,,, | Performed by: DIETITIAN, REGISTERED

## 2020-04-03 ENCOUNTER — CLINICAL SUPPORT (OUTPATIENT)
Dept: INTERNAL MEDICINE | Facility: CLINIC | Age: 55
End: 2020-04-03
Payer: COMMERCIAL

## 2020-04-03 DIAGNOSIS — Z00.00 ROUTINE GENERAL MEDICAL EXAMINATION AT A HEALTH CARE FACILITY: Primary | ICD-10-CM

## 2020-04-03 PROCEDURE — 99412 PREVENTIVE COUNSELING GROUP: CPT | Mod: S$GLB,,, | Performed by: DIETITIAN, REGISTERED

## 2020-04-03 PROCEDURE — 99412 PR PREVENT COUNSEL,GROUP,60 MIN: ICD-10-PCS | Mod: S$GLB,,, | Performed by: DIETITIAN, REGISTERED

## 2020-04-17 ENCOUNTER — CLINICAL SUPPORT (OUTPATIENT)
Dept: INTERNAL MEDICINE | Facility: CLINIC | Age: 55
End: 2020-04-17
Payer: COMMERCIAL

## 2020-04-17 DIAGNOSIS — Z00.00 ROUTINE GENERAL MEDICAL EXAMINATION AT A HEALTH CARE FACILITY: Primary | ICD-10-CM

## 2020-04-17 PROCEDURE — 99412 PR PREVENT COUNSEL,GROUP,60 MIN: ICD-10-PCS | Mod: S$GLB,,, | Performed by: DIETITIAN, REGISTERED

## 2020-04-17 PROCEDURE — 99412 PREVENTIVE COUNSELING GROUP: CPT | Mod: S$GLB,,, | Performed by: DIETITIAN, REGISTERED

## 2020-04-20 ENCOUNTER — TELEPHONE (OUTPATIENT)
Dept: ELECTROPHYSIOLOGY | Facility: CLINIC | Age: 55
End: 2020-04-20

## 2020-04-20 NOTE — TELEPHONE ENCOUNTER
Spoke with patient concerning appointments on 5/5/2020 with BENJI Hunter.  She is interested in a virtual visual visit with Dr. Hawley. She has an appointment for a echo which she wants to keep. Would like to discuss 30 day monitor results with Dr. Hawley as well. Has no other cardiac/arrhyhmia concerns at this time.

## 2020-04-21 ENCOUNTER — PATIENT MESSAGE (OUTPATIENT)
Dept: ELECTROPHYSIOLOGY | Facility: CLINIC | Age: 55
End: 2020-04-21

## 2020-04-21 DIAGNOSIS — Z01.84 ANTIBODY RESPONSE EXAMINATION: ICD-10-CM

## 2020-04-22 ENCOUNTER — LAB VISIT (OUTPATIENT)
Dept: LAB | Facility: HOSPITAL | Age: 55
End: 2020-04-22
Attending: INTERNAL MEDICINE
Payer: COMMERCIAL

## 2020-04-22 DIAGNOSIS — Z01.84 ANTIBODY RESPONSE EXAMINATION: ICD-10-CM

## 2020-04-22 LAB — SARS-COV-2 IGG SERPL QL IA: NEGATIVE

## 2020-04-22 PROCEDURE — 86769 SARS-COV-2 COVID-19 ANTIBODY: CPT

## 2020-04-22 PROCEDURE — 36415 COLL VENOUS BLD VENIPUNCTURE: CPT

## 2020-04-24 ENCOUNTER — CLINICAL SUPPORT (OUTPATIENT)
Dept: INTERNAL MEDICINE | Facility: CLINIC | Age: 55
End: 2020-04-24
Payer: COMMERCIAL

## 2020-04-24 DIAGNOSIS — Z00.00 ROUTINE GENERAL MEDICAL EXAMINATION AT A HEALTH CARE FACILITY: Primary | ICD-10-CM

## 2020-04-24 PROCEDURE — 99412 PR PREVENT COUNSEL,GROUP,60 MIN: ICD-10-PCS | Mod: S$GLB,,, | Performed by: DIETITIAN, REGISTERED

## 2020-04-24 PROCEDURE — 99412 PREVENTIVE COUNSELING GROUP: CPT | Mod: S$GLB,,, | Performed by: DIETITIAN, REGISTERED

## 2020-05-01 ENCOUNTER — CLINICAL SUPPORT (OUTPATIENT)
Dept: INTERNAL MEDICINE | Facility: CLINIC | Age: 55
End: 2020-05-01
Payer: COMMERCIAL

## 2020-05-01 DIAGNOSIS — Z00.00 ROUTINE GENERAL MEDICAL EXAMINATION AT A HEALTH CARE FACILITY: Primary | ICD-10-CM

## 2020-05-01 PROCEDURE — 99412 PR PREVENT COUNSEL,GROUP,60 MIN: ICD-10-PCS | Mod: S$GLB,,, | Performed by: DIETITIAN, REGISTERED

## 2020-05-01 PROCEDURE — 99412 PREVENTIVE COUNSELING GROUP: CPT | Mod: S$GLB,,, | Performed by: DIETITIAN, REGISTERED

## 2020-05-05 ENCOUNTER — HOSPITAL ENCOUNTER (OUTPATIENT)
Dept: CARDIOLOGY | Facility: CLINIC | Age: 55
Discharge: HOME | End: 2020-05-05
Attending: NURSE PRACTITIONER
Payer: COMMERCIAL

## 2020-05-05 VITALS
HEART RATE: 60 BPM | DIASTOLIC BLOOD PRESSURE: 82 MMHG | HEIGHT: 66 IN | BODY MASS INDEX: 46.61 KG/M2 | WEIGHT: 290 LBS | SYSTOLIC BLOOD PRESSURE: 132 MMHG

## 2020-05-05 DIAGNOSIS — I42.9 CARDIOMYOPATHY, UNSPECIFIED TYPE: ICD-10-CM

## 2020-05-05 DIAGNOSIS — I48.0 PAF (PAROXYSMAL ATRIAL FIBRILLATION): ICD-10-CM

## 2020-05-05 LAB
ASCENDING AORTA: 2.98 CM
AV INDEX (PROSTH): 0.58
AV MEAN GRADIENT: 12 MMHG
AV PEAK GRADIENT: 23 MMHG
AV VALVE AREA: 1.83 CM2
AV VELOCITY RATIO: 0.53
BSA FOR ECHO PROCEDURE: 2.47 M2
CV ECHO LV RWT: 0.33 CM
DOP CALC AO PEAK VEL: 2.42 M/S
DOP CALC AO VTI: 47.9 CM
DOP CALC LVOT AREA: 3.2 CM2
DOP CALC LVOT DIAMETER: 2.01 CM
DOP CALC LVOT PEAK VEL: 1.29 M/S
DOP CALC LVOT STROKE VOLUME: 87.47 CM3
DOP CALCLVOT PEAK VEL VTI: 27.58 CM
E WAVE DECELERATION TIME: 327.47 MSEC
E/A RATIO: 0.76
E/E' RATIO: 8.8 M/S
ECHO LV POSTERIOR WALL: 0.79 CM (ref 0.6–1.1)
FRACTIONAL SHORTENING: 32 % (ref 28–44)
INTERVENTRICULAR SEPTUM: 0.79 CM (ref 0.6–1.1)
IVRT: 114.18 MSEC
LA MAJOR: 6.41 CM
LA MINOR: 6.4 CM
LA WIDTH: 4.59 CM
LEFT ATRIUM SIZE: 4.63 CM
LEFT ATRIUM VOLUME INDEX: 49.4 ML/M2
LEFT ATRIUM VOLUME: 115.7 CM3
LEFT INTERNAL DIMENSION IN SYSTOLE: 3.22 CM (ref 2.1–4)
LEFT VENTRICLE DIASTOLIC VOLUME INDEX: 45.18 ML/M2
LEFT VENTRICLE DIASTOLIC VOLUME: 105.82 ML
LEFT VENTRICLE MASS INDEX: 53 G/M2
LEFT VENTRICLE SYSTOLIC VOLUME INDEX: 17.8 ML/M2
LEFT VENTRICLE SYSTOLIC VOLUME: 41.71 ML
LEFT VENTRICULAR INTERNAL DIMENSION IN DIASTOLE: 4.77 CM (ref 3.5–6)
LEFT VENTRICULAR MASS: 123.33 G
LV LATERAL E/E' RATIO: 7.33 M/S
LV SEPTAL E/E' RATIO: 11 M/S
MV PEAK A VEL: 0.87 M/S
MV PEAK E VEL: 0.66 M/S
PISA TR MAX VEL: 2.16 M/S
PULM VEIN S/D RATIO: 1.7
PV PEAK D VEL: 0.4 M/S
PV PEAK S VEL: 0.68 M/S
RA MAJOR: 4.63 CM
RA PRESSURE: 3 MMHG
RA WIDTH: 2.85 CM
RIGHT VENTRICULAR END-DIASTOLIC DIMENSION: 3.88 CM
RV TISSUE DOPPLER FREE WALL SYSTOLIC VELOCITY 1 (APICAL 4 CHAMBER VIEW): 11.67 CM/S
SINUS: 2.58 CM
STJ: 2.21 CM
TDI LATERAL: 0.09 M/S
TDI SEPTAL: 0.06 M/S
TDI: 0.08 M/S
TR MAX PG: 19 MMHG
TRICUSPID ANNULAR PLANE SYSTOLIC EXCURSION: 2.23 CM
TV REST PULMONARY ARTERY PRESSURE: 22 MMHG

## 2020-05-05 PROCEDURE — 93306 TTE W/DOPPLER COMPLETE: CPT

## 2020-05-05 PROCEDURE — 93306 ECHO (CUPID ONLY): ICD-10-PCS | Mod: 26,,, | Performed by: INTERNAL MEDICINE

## 2020-05-05 PROCEDURE — 93306 TTE W/DOPPLER COMPLETE: CPT | Mod: 26,,, | Performed by: INTERNAL MEDICINE

## 2020-05-06 ENCOUNTER — TELEPHONE (OUTPATIENT)
Dept: ORTHOPEDICS | Facility: CLINIC | Age: 55
End: 2020-05-06

## 2020-05-08 ENCOUNTER — PATIENT MESSAGE (OUTPATIENT)
Dept: ELECTROPHYSIOLOGY | Facility: CLINIC | Age: 55
End: 2020-05-08

## 2020-05-08 ENCOUNTER — OFFICE VISIT (OUTPATIENT)
Dept: ORTHOPEDICS | Facility: CLINIC | Age: 55
End: 2020-05-08
Payer: COMMERCIAL

## 2020-05-08 VITALS
DIASTOLIC BLOOD PRESSURE: 79 MMHG | WEIGHT: 290 LBS | HEART RATE: 67 BPM | BODY MASS INDEX: 46.61 KG/M2 | SYSTOLIC BLOOD PRESSURE: 138 MMHG | HEIGHT: 66 IN

## 2020-05-08 DIAGNOSIS — M92.212 KIENBOCK'S DISEASE, LEFT: ICD-10-CM

## 2020-05-08 DIAGNOSIS — G56.21 CUBITAL TUNNEL SYNDROME ON RIGHT: ICD-10-CM

## 2020-05-08 DIAGNOSIS — G56.01 CARPAL TUNNEL SYNDROME ON RIGHT: ICD-10-CM

## 2020-05-08 DIAGNOSIS — M77.11 RIGHT TENNIS ELBOW: Primary | ICD-10-CM

## 2020-05-08 PROCEDURE — 99214 OFFICE O/P EST MOD 30 MIN: CPT | Mod: S$GLB,,, | Performed by: PHYSICIAN ASSISTANT

## 2020-05-08 PROCEDURE — 3075F PR MOST RECENT SYSTOLIC BLOOD PRESS GE 130-139MM HG: ICD-10-PCS | Mod: CPTII,S$GLB,, | Performed by: PHYSICIAN ASSISTANT

## 2020-05-08 PROCEDURE — 3008F BODY MASS INDEX DOCD: CPT | Mod: CPTII,S$GLB,, | Performed by: PHYSICIAN ASSISTANT

## 2020-05-08 PROCEDURE — 3078F PR MOST RECENT DIASTOLIC BLOOD PRESSURE < 80 MM HG: ICD-10-PCS | Mod: CPTII,S$GLB,, | Performed by: PHYSICIAN ASSISTANT

## 2020-05-08 PROCEDURE — 99999 PR PBB SHADOW E&M-EST. PATIENT-LVL III: CPT | Mod: PBBFAC,,, | Performed by: PHYSICIAN ASSISTANT

## 2020-05-08 PROCEDURE — 99999 PR PBB SHADOW E&M-EST. PATIENT-LVL III: ICD-10-PCS | Mod: PBBFAC,,, | Performed by: PHYSICIAN ASSISTANT

## 2020-05-08 PROCEDURE — 99214 PR OFFICE/OUTPT VISIT, EST, LEVL IV, 30-39 MIN: ICD-10-PCS | Mod: S$GLB,,, | Performed by: PHYSICIAN ASSISTANT

## 2020-05-08 PROCEDURE — 3008F PR BODY MASS INDEX (BMI) DOCUMENTED: ICD-10-PCS | Mod: CPTII,S$GLB,, | Performed by: PHYSICIAN ASSISTANT

## 2020-05-08 PROCEDURE — 3078F DIAST BP <80 MM HG: CPT | Mod: CPTII,S$GLB,, | Performed by: PHYSICIAN ASSISTANT

## 2020-05-08 PROCEDURE — 3075F SYST BP GE 130 - 139MM HG: CPT | Mod: CPTII,S$GLB,, | Performed by: PHYSICIAN ASSISTANT

## 2020-05-12 ENCOUNTER — TELEPHONE (OUTPATIENT)
Dept: ORTHOPEDICS | Facility: CLINIC | Age: 55
End: 2020-05-12

## 2020-05-12 ENCOUNTER — PROCEDURE VISIT (OUTPATIENT)
Dept: PHYSICAL MEDICINE AND REHAB | Facility: CLINIC | Age: 55
End: 2020-05-12
Payer: COMMERCIAL

## 2020-05-12 DIAGNOSIS — G56.21 CUBITAL TUNNEL SYNDROME ON RIGHT: ICD-10-CM

## 2020-05-12 DIAGNOSIS — G56.01 CARPAL TUNNEL SYNDROME ON RIGHT: ICD-10-CM

## 2020-05-12 PROCEDURE — 95886 MUSC TEST DONE W/N TEST COMP: CPT | Mod: 26,S$GLB,, | Performed by: PHYSICAL MEDICINE & REHABILITATION

## 2020-05-12 PROCEDURE — 95911 PR NERVE CONDUCTION STUDY; 9-10 STUDIES: ICD-10-PCS | Mod: 26,S$GLB,, | Performed by: PHYSICAL MEDICINE & REHABILITATION

## 2020-05-12 PROCEDURE — 95886 PR EMG COMPLETE, W/ NERVE CONDUCTION STUDIES, 5+ MUSCLES: ICD-10-PCS | Mod: 26,S$GLB,, | Performed by: PHYSICAL MEDICINE & REHABILITATION

## 2020-05-12 PROCEDURE — 95911 NRV CNDJ TEST 9-10 STUDIES: CPT | Mod: 26,S$GLB,, | Performed by: PHYSICAL MEDICINE & REHABILITATION

## 2020-05-12 NOTE — PROCEDURES
Test Date:  2020    Patient: Vicky Alvarado : 1977 Physician: Jules Antonio D.O.   ID#:  SEX: Male Ref. Phys: Echo Aguirre PA-C     HPI: Vicky Alvarado is a 55 y.o.female who presents for NCS/EMG to evaluate for bilateral (R>L) hand numbness, tingling, and pain.  Specifically to rule out CTS and cubital tunnel syndrome.      NCV & EMG Findings:   Evaluation of the right median motor nerve showed prolonged distal onset latency.   The left median sensory nerve showed prolonged distal peak latency and decreased conduction velocity.   The right median sensory nerve showed no response.   All remaining nerves (as indicated in the following tables) were within normal limits.   Needle evaluation of the right Abductor Pollicis Brevis muscle showed diminished recruitment.   All remaining muscles (as indicated in the following table) showed no evidence of electrical instability.    Impression:  1. There is electrophysiologic evidence of a bilateral (sensory on the left, sensorimotor on the right) median mononeuropathy across the wrist (I.e. Carpal tunnel syndrome).  There is no motor axonal loss.  There is is no active denervation.  This is graded as Moderate in severity on the right and mild on the left.      2. There is no evidence to suggest an ulnar neuropathy of the right upper extremity    ___________________________  Jules Antonio D.O.        NCS+  Motor Nerve Results      Latency Amplitude F-Lat Segment Distance CV Comment   Site (ms) Norm (mV) Norm (ms)  (cm) (m/s) Norm    Left Median (APB)   Wrist 4.0  < 4.6 11.6  > 4.2  Wrist-Palm - - -    Elbow 8.3 - 10.8 -  Elbow-Wrist 24 56  > 47    Right Median (APB)   Wrist *6.9  < 4.6 8.4  > 4.2  Wrist-Palm - - -    Elbow 11.7 - 8.1 -  Elbow-Wrist 23 48  > 47    Left Ulnar (ADM)   Wrist 2.9  < 3.7 7.2  > 3.0         Bel Elbow 6.7 - 6.0 -  Bel Elbow-Wrist 22 58  > 52    Abv Elbow 9.1 - 4.8 -  Abv Elbow-Bel Elbow 14 58  > 43    Right  Ulnar (ADM)   Wrist 3.1  < 3.7 11.5  > 3.0         Bel Elbow 7.1 - 9.5 -  Bel Elbow-Wrist 24 60  > 52    Abv Elbow 10.2 - 8.6 -  Abv Elbow-Bel Elbow 14 45  > 43      Sensory Nerve Results      Latency (Peak) Amplitude (P-P) Segment Distance CV Comment   Site (ms) Norm (µV) Norm  (cm) (m/s) Norm    Left Median (Stim:Wrist)   Dig II *4.1  < 4.0 53  > 13 Wrist-Dig II 14 *34  > 39    Right Median (Stim:Wrist)   Dig II *NR  < 4.0 *NR  > 13 Wrist-Dig II 14 *NR  > 39    Left Ulnar (Stim:Wrist)   Dig V 3.2  < 4.0 24  > 8 Wrist-Dig V 14 44  > 38    Right Ulnar (Stim:Wrist)   Dig V 3.3  < 4.0 34  > 8 Wrist-Dig V 14 42  > 38    Right Dorsal Ulnar Cutaneous (Stim:Wrist)   Dorsum 5th MC 1.33 - 16 - Wrist-Dorsum 5th MC - - -      EMG+     Side Muscle Nerve Root Ins Act Fibs Psw Amp Dur Poly Recrt Int Pat Comment   Right Biceps Musculocut C5-C6 Nml Nml Nml Nml Nml 0 Nml Nml    Right Triceps Radial C6-C8 Nml Nml Nml Nml Nml 0 Nml Nml    Right Brachiorad Radial C5-C6 Nml Nml Nml Nml Nml 0 Nml Nml    Right FDI Ulnar C8-T1 Nml Nml Nml Nml Nml 0 Nml Nml    Right APB Median C8-T1 Nml Nml Nml Nml Nml 0 *Reduced Nml    Right FCU Ulnar C8-T1 Nml Nml Nml Nml Nml 0 Nml Nml            Waveforms:    Motor              Sensory

## 2020-05-12 NOTE — TELEPHONE ENCOUNTER
Left patient a voicemail for patient to reschedule her 5/19/20 appointment to 5/18/20 with Echo for emg results.

## 2020-05-15 ENCOUNTER — TELEPHONE (OUTPATIENT)
Dept: ORTHOPEDICS | Facility: CLINIC | Age: 55
End: 2020-05-15

## 2020-05-18 ENCOUNTER — TELEPHONE (OUTPATIENT)
Dept: ORTHOPEDICS | Facility: CLINIC | Age: 55
End: 2020-05-18

## 2020-05-18 ENCOUNTER — HOSPITAL ENCOUNTER (OUTPATIENT)
Dept: RADIOLOGY | Facility: OTHER | Age: 55
Discharge: HOME | End: 2020-05-18
Attending: PHYSICIAN ASSISTANT
Payer: COMMERCIAL

## 2020-05-18 ENCOUNTER — OFFICE VISIT (OUTPATIENT)
Dept: ORTHOPEDICS | Facility: CLINIC | Age: 55
End: 2020-05-18
Payer: COMMERCIAL

## 2020-05-18 VITALS
WEIGHT: 290 LBS | HEIGHT: 66 IN | SYSTOLIC BLOOD PRESSURE: 145 MMHG | HEART RATE: 69 BPM | DIASTOLIC BLOOD PRESSURE: 72 MMHG | BODY MASS INDEX: 46.61 KG/M2

## 2020-05-18 DIAGNOSIS — G56.21 CUBITAL TUNNEL SYNDROME ON RIGHT: ICD-10-CM

## 2020-05-18 DIAGNOSIS — M92.212 KIENBOCK'S DISEASE, LEFT: ICD-10-CM

## 2020-05-18 DIAGNOSIS — Z01.818 PREOP EXAMINATION: Primary | ICD-10-CM

## 2020-05-18 DIAGNOSIS — G56.01 CARPAL TUNNEL SYNDROME ON RIGHT: Primary | ICD-10-CM

## 2020-05-18 DIAGNOSIS — M77.11 RIGHT TENNIS ELBOW: ICD-10-CM

## 2020-05-18 PROCEDURE — 99214 PR OFFICE/OUTPT VISIT, EST, LEVL IV, 30-39 MIN: ICD-10-PCS | Mod: S$GLB,,, | Performed by: PHYSICIAN ASSISTANT

## 2020-05-18 PROCEDURE — 3008F BODY MASS INDEX DOCD: CPT | Mod: CPTII,S$GLB,, | Performed by: PHYSICIAN ASSISTANT

## 2020-05-18 PROCEDURE — 99999 PR PBB SHADOW E&M-EST. PATIENT-LVL III: CPT | Mod: PBBFAC,,, | Performed by: PHYSICIAN ASSISTANT

## 2020-05-18 PROCEDURE — 73110 X-RAY EXAM OF WRIST: CPT | Mod: 26,LT,, | Performed by: RADIOLOGY

## 2020-05-18 PROCEDURE — 3078F PR MOST RECENT DIASTOLIC BLOOD PRESSURE < 80 MM HG: ICD-10-PCS | Mod: CPTII,S$GLB,, | Performed by: PHYSICIAN ASSISTANT

## 2020-05-18 PROCEDURE — 73110 X-RAY EXAM OF WRIST: CPT | Mod: TC,FY,LT

## 2020-05-18 PROCEDURE — 3077F SYST BP >= 140 MM HG: CPT | Mod: CPTII,S$GLB,, | Performed by: PHYSICIAN ASSISTANT

## 2020-05-18 PROCEDURE — 99999 PR PBB SHADOW E&M-EST. PATIENT-LVL III: ICD-10-PCS | Mod: PBBFAC,,, | Performed by: PHYSICIAN ASSISTANT

## 2020-05-18 PROCEDURE — 99214 OFFICE O/P EST MOD 30 MIN: CPT | Mod: S$GLB,,, | Performed by: PHYSICIAN ASSISTANT

## 2020-05-18 PROCEDURE — 3078F DIAST BP <80 MM HG: CPT | Mod: CPTII,S$GLB,, | Performed by: PHYSICIAN ASSISTANT

## 2020-05-18 PROCEDURE — 3008F PR BODY MASS INDEX (BMI) DOCUMENTED: ICD-10-PCS | Mod: CPTII,S$GLB,, | Performed by: PHYSICIAN ASSISTANT

## 2020-05-18 PROCEDURE — 73110 XR WRIST COMPLETE 3 VIEWS LEFT: ICD-10-PCS | Mod: 26,LT,, | Performed by: RADIOLOGY

## 2020-05-18 PROCEDURE — 3077F PR MOST RECENT SYSTOLIC BLOOD PRESSURE >= 140 MM HG: ICD-10-PCS | Mod: CPTII,S$GLB,, | Performed by: PHYSICIAN ASSISTANT

## 2020-05-18 NOTE — PROGRESS NOTES
Subjective:      Patient ID: Vicky Alvarado is a 55 y.o. female.    Chief Complaint: EMG results    5/18/20  Pt presents for follow up, EMG results. She reports some improvement in right tennis elbow, she has been wearing the alysa strap consistently, wasn't able to begin OT due to covid. She continues to have intermittent numbness and tingling throughout the right upper extremity, this is located in both the median and ulnar nerve distributions. She recently completed an EMG which showed moderate right and mild left carpal tunnel.       Review of patient's allergies indicates:  No Known Allergies      Current Outpatient Medications   Medication Sig Dispense Refill    apixaban (ELIQUIS) 5 mg Tab Take 1 tablet (5 mg total) by mouth 2 (two) times daily. 60 tablet 3    cyanocobalamin (VITAMIN B-12) 1000 MCG tablet Take 100 mcg by mouth once daily.       DULoxetine (CYMBALTA) 60 MG capsule Take 2 capsules (120 mg total) by mouth once daily. 180 capsule 3    gluc-shikha-msm#9-A-mylr-reymundo-bor 750 mg-644 mg- 30 mg-1 mg Tab Take 1 tablet by mouth once daily.       hydroCHLOROthiazide (HYDRODIURIL) 50 MG tablet Take 1 tablet (50 mg total) by mouth once daily. 90 tablet 5    lisinopriL (PRINIVIL,ZESTRIL) 40 MG tablet Take 1 tablet (40 mg total) by mouth once daily. 90 tablet 5    MELATONIN ORAL Take 1-2 tablets by mouth nightly as needed (Sleep).      metoprolol succinate (TOPROL-XL) 50 MG 24 hr tablet Take 2 tablets (100 mg total) by mouth once daily. 30 tablet 5    multivitamin (THERAGRAN) per tablet Take 1 tablet by mouth once daily.      omega-3 fatty acids-vitamin E 1,000 mg Cap       omeprazole (PRILOSEC) 20 MG capsule TAKE 1 CAPSULE BY MOUTH ONCE DAILY 90 capsule 3    traZODone (DESYREL) 100 MG tablet Take 1 tablet (100 mg total) by mouth nightly as needed for Insomnia. 90 tablet 3     No current facility-administered medications for this visit.        Past Medical History:   Diagnosis Date    Depression   "   Hx of psychiatric care     Psychiatric problem        Past Surgical History:   Procedure Laterality Date    TREATMENT OF CARDIAC ARRHYTHMIA N/A 1/29/2020    Procedure: CARDIOVERSION;  Surgeon: Gabriel Hawley MD;  [facility] EP LAB;  Service: Cardiology;  Laterality: N/A;  af, demi, dccv, anes, mb, 345       Review of Systems:  Constitutional: Negative for chills and fever.   Respiratory: Negative for cough and shortness of breath.    Gastrointestinal: Negative for nausea and vomiting.   Skin: Negative for rash.   Neurological: Negative for dizziness and headaches.   Psychiatric/Behavioral: Negative for depression.   MSK as in HPI       OBJECTIVE:     PHYSICAL EXAM:  BP (!) 145/72 (BP Location: Left arm, Patient Position: Sitting, BP Method: Large (Automatic))   Pulse 69   Ht 5' 6" (1.676 m)   Wt 131.5 kg (290 lb)   LMP 06/08/2015   BMI 46.81 kg/m²     GEN:  NAD, well-developed, well-groomed.  NEURO: Awake, alert, and oriented. Normal attention and concentration.    PSYCH: Normal mood and affect. Behavior is normal.  HEENT: No cervical lymphadenopathy noted.  CARDIOVASCULAR: Radial pulses 2+ bilaterally. No LE edema noted.  PULMONARY: Breath sounds normal. No respiratory distress.  SKIN: Intact, no rashes.      MSK:   RUE:  Good active ROM of the wrist and fingers. ttp over the right lateral epicondyle.. Positive tinels at the cubital and carpal tunnels on the right. Positive compression test at the cubital tunnel. Positive durkans. AIN/PIN/Radial/Median/Ulnar Nerves assessed in isolation without deficit. Radial & Ulnar arteries palpated 2+. Capillary Refill <3s.      RADIOGRAPHS:  Xray right elbow 3/20/20  FINDINGS:  Mild DJD.  No fracture or dislocation.  No bone destruction identified    Xray right wrist 3/20/20  FINDINGS:  Mild DJD.  Small bone island in the distal radius.  No fracture or dislocation.  No bone destruction identified    Comments: I have personally reviewed the imaging and I " agree with the above radiologist's report.    EMG 5/18/20  Impression:  1. There is electrophysiologic evidence of a bilateral (sensory   on the left, sensorimotor on the right) median mononeuropathy   across the wrist (I.e. Carpal tunnel syndrome).  There is no   motor axonal loss.  There is is no active denervation.  This is   graded as Moderate in severity on the right and mild on the left.        2. There is no evidence to suggest an ulnar neuropathy of the   right upper extremity    ASSESSMENT/PLAN:       ICD-10-CM ICD-9-CM   1. Carpal tunnel syndrome on right G56.01 354.0   2. Cubital tunnel syndrome on right G56.21 354.2   3. Right tennis elbow M77.11 726.32     Plan:   -EMG reviewed, treatment options discussed. Pt wishes to proceed with a right carpal tunnel release and right ulnar nerve decompression at the elbow. Consents reviewed and signed in clinic. All questions answered. We discussed meeting with her PCP prior to surgery.   -RTC post op     The patient indicates understanding of these issues and agrees to the plan.    Echo Aguirre PA-C  [facility]   [facility]  [place], [place]

## 2020-05-19 ENCOUNTER — TELEPHONE (OUTPATIENT)
Dept: ORTHOPEDICS | Facility: CLINIC | Age: 55
End: 2020-05-19

## 2020-05-19 DIAGNOSIS — G56.21 CUBITAL TUNNEL SYNDROME ON RIGHT: ICD-10-CM

## 2020-05-19 DIAGNOSIS — G56.01 CARPAL TUNNEL SYNDROME ON RIGHT: Primary | ICD-10-CM

## 2020-05-19 NOTE — TELEPHONE ENCOUNTER
Vicky Mason is scheduled for Right CTR and Right Elbow Ulnar Nerve Decompression on 6/10/20 with Dr Aravind Gustafson. Is it okay for her to stop the Eliquis prior to her surgery, and if so, when should she stop it?

## 2020-05-22 ENCOUNTER — TELEPHONE (OUTPATIENT)
Dept: ELECTROPHYSIOLOGY | Facility: CLINIC | Age: 55
End: 2020-05-22

## 2020-05-22 ENCOUNTER — PATIENT MESSAGE (OUTPATIENT)
Dept: ELECTROPHYSIOLOGY | Facility: CLINIC | Age: 55
End: 2020-05-22

## 2020-05-22 NOTE — TELEPHONE ENCOUNTER
----- Message from Danica Tenorio RN sent at 5/22/2020 10:03 AM CDT -----  Linda,     I saw that you spoke to Ms. Alvarado about an appt. I talked to her about her Eliquis and she would like to go ahead with scheduling the appt. Can you give her a call to take care of this?    Thanks,   Danica

## 2020-05-22 NOTE — TELEPHONE ENCOUNTER
Called & spoke with Mrs. Alvarado; she confirmed apts on July 7th are okay- scheduled EKG for 9:00AM and Dr. Hawley's apt for 9:40AM on 7/7/20.I let her know I still need to ask about how to send her the monitor results , I am still working on that and will let her know when I can send them.  Also, let her know Danica sent her a message through the portal regarding instructions to stopping Eliquis for her procedure.  Mrs. Alvarado thanked me for calling.

## 2020-06-03 ENCOUNTER — TELEPHONE (OUTPATIENT)
Dept: ORTHOPEDICS | Facility: CLINIC | Age: 55
End: 2020-06-03

## 2020-06-03 PROBLEM — R73.03 PRE-DIABETES: Status: ACTIVE | Noted: 2020-06-03

## 2020-06-03 NOTE — TELEPHONE ENCOUNTER
----- Message from Lilian Montemayor MA sent at 6/3/2020 12:30 PM CDT -----  Contact: SHAWNA GRAHAM [[id number]]      ----- Message -----  From: Donna Toth  Sent: 6/3/2020  11:20 AM CDT  To: Rafael WORRELL Staff    Who called:SHAWNA GRAHAM [[id number]]    What is the request in detail: Patient is requesting a call back. She states she was in touch with her pcp who will be sending over her medical clearance through [employer].  Please advise.    Can the clinic reply by [facility]? No    Best call back number: [phone]    Additional Information: N/A

## 2020-06-03 NOTE — TELEPHONE ENCOUNTER
Attempted to contact pt. Left voicemail informing pt that pre-op clinic note from her PCP & letter of clearance was received via [employer]. Asked pt to return call to clinic at [phone] c additional questions/concerns.

## 2020-06-04 RX ORDER — MUPIROCIN 20 MG/G
OINTMENT TOPICAL
Status: CANCELLED | OUTPATIENT
Start: 2020-06-04

## 2020-06-04 RX ORDER — HYDROCODONE BITARTRATE AND ACETAMINOPHEN 5; 325 MG/1; MG/1
1 TABLET ORAL EVERY 6 HOURS PRN
Qty: 20 TABLET | Refills: 0 | Status: SHIPPED | OUTPATIENT
Start: 2020-06-04 | End: 2020-06-18 | Stop reason: SDUPTHER

## 2020-06-04 NOTE — H&P
The patient has been examined and the H&P has been reviewed:    I concur with the findings and no changes have occurred since H&P was written. Surgical site marked with patient participation.    Anesthesia/Surgery risks, benefits and alternative options discussed and understood by patient/family.    5/18/2020    Subjective:       Patient ID: Vicky Alvarado is a 55 y.o. female.     Chief Complaint: EMG results     5/18/20  Pt presents for follow up, EMG results. She reports some improvement in right tennis elbow, she has been wearing the alysa strap consistently, wasn't able to begin OT due to covid. She continues to have intermittent numbness and tingling throughout the right upper extremity, this is located in both the median and ulnar nerve distributions. She recently completed an EMG which showed moderate right and mild left carpal tunnel.         Review of patient's allergies indicates:  No Known Allergies       Current Medications          Current Outpatient Medications   Medication Sig Dispense Refill    apixaban (ELIQUIS) 5 mg Tab Take 1 tablet (5 mg total) by mouth 2 (two) times daily. 60 tablet 3    cyanocobalamin (VITAMIN B-12) 1000 MCG tablet Take 100 mcg by mouth once daily.         DULoxetine (CYMBALTA) 60 MG capsule Take 2 capsules (120 mg total) by mouth once daily. 180 capsule 3    gluc-shikha-msm#7-F-pgia-reymundo-bor 750 mg-644 mg- 30 mg-1 mg Tab Take 1 tablet by mouth once daily.         hydroCHLOROthiazide (HYDRODIURIL) 50 MG tablet Take 1 tablet (50 mg total) by mouth once daily. 90 tablet 5    lisinopriL (PRINIVIL,ZESTRIL) 40 MG tablet Take 1 tablet (40 mg total) by mouth once daily. 90 tablet 5    MELATONIN ORAL Take 1-2 tablets by mouth nightly as needed (Sleep).        metoprolol succinate (TOPROL-XL) 50 MG 24 hr tablet Take 2 tablets (100 mg total) by mouth once daily. 30 tablet 5    multivitamin (THERAGRAN) per tablet Take 1 tablet by mouth once daily.        omega-3 fatty  "acids-vitamin E 1,000 mg Cap          omeprazole (PRILOSEC) 20 MG capsule TAKE 1 CAPSULE BY MOUTH ONCE DAILY 90 capsule 3    traZODone (DESYREL) 100 MG tablet Take 1 tablet (100 mg total) by mouth nightly as needed for Insomnia. 90 tablet 3      No current facility-administered medications for this visit.                  Past Medical History:   Diagnosis Date    Depression      Hx of psychiatric care      Psychiatric problem                 Past Surgical History:   Procedure Laterality Date    TREATMENT OF CARDIAC ARRHYTHMIA N/A 1/29/2020     Procedure: CARDIOVERSION;  Surgeon: Gabriel Hawley MD;  Location: [facility] EP LAB;  Service: Cardiology;  Laterality: N/A;  af, demi, dccv, anes, mb, 345         Review of Systems:  Constitutional: Negative for chills and fever.   Respiratory: Negative for cough and shortness of breath.    Gastrointestinal: Negative for nausea and vomiting.   Skin: Negative for rash.   Neurological: Negative for dizziness and headaches.   Psychiatric/Behavioral: Negative for depression.   MSK as in HPI         OBJECTIVE:      PHYSICAL EXAM:  BP (!) 145/72 (BP Location: Left arm, Patient Position: Sitting, BP Method: Large (Automatic))   Pulse 69   Ht 5' 6" (1.676 m)   Wt 131.5 kg (290 lb)   LMP 06/08/2015   BMI 46.81 kg/m²      GEN:  NAD, well-developed, well-groomed.  NEURO: Awake, alert, and oriented. Normal attention and concentration.    PSYCH: Normal mood and affect. Behavior is normal.  HEENT: No cervical lymphadenopathy noted.  CARDIOVASCULAR: Radial pulses 2+ bilaterally. No LE edema noted.  PULMONARY: Breath sounds normal. No respiratory distress.  SKIN: Intact, no rashes.       MSK:   RUE:  Good active ROM of the wrist and fingers. ttp over the right lateral epicondyle.. Positive tinels at the cubital and carpal tunnels on the right. Positive compression test at the cubital tunnel. Positive durkans. AIN/PIN/Radial/Median/Ulnar Nerves assessed in isolation without deficit. " Radial & Ulnar arteries palpated 2+. Capillary Refill <3s.        RADIOGRAPHS:  Xray right elbow 3/20/20  FINDINGS:  Mild DJD.  No fracture or dislocation.  No bone destruction identified     Xray right wrist 3/20/20  FINDINGS:  Mild DJD.  Small bone island in the distal radius.  No fracture or dislocation.  No bone destruction identified     Comments: I have personally reviewed the imaging and I agree with the above radiologist's report.     EMG 5/18/20  Impression:  1. There is electrophysiologic evidence of a bilateral (sensory   on the left, sensorimotor on the right) median mononeuropathy   across the wrist (I.e. Carpal tunnel syndrome).  There is no   motor axonal loss.  There is is no active denervation.  This is   graded as Moderate in severity on the right and mild on the left.        2. There is no evidence to suggest an ulnar neuropathy of the   right upper extremity     ASSESSMENT/PLAN:          ICD-10-CM ICD-9-CM   1. Carpal tunnel syndrome on right G56.01 354.0   2. Cubital tunnel syndrome on right G56.21 354.2   3. Right tennis elbow M77.11 726.32      Plan:   -EMG reviewed, treatment options discussed. Pt wishes to proceed with a right carpal tunnel release and right ulnar nerve decompression at the elbow. Consents reviewed and signed in clinic. All questions answered. We discussed meeting with her PCP prior to surgery.   -RTC post op      The patient indicates understanding of these issues and agrees to the plan.

## 2020-06-05 ENCOUNTER — ANESTHESIA EVENT (OUTPATIENT)
Dept: SURGERY | Facility: OTHER | Age: 55
End: 2020-06-05
Payer: COMMERCIAL

## 2020-06-05 ENCOUNTER — TELEPHONE (OUTPATIENT)
Dept: ORTHOPEDICS | Facility: CLINIC | Age: 55
End: 2020-06-05

## 2020-06-05 ENCOUNTER — HOSPITAL ENCOUNTER (OUTPATIENT)
Dept: PREADMISSION TESTING | Facility: OTHER | Age: 55
Discharge: HOME | End: 2020-06-05
Attending: ORTHOPAEDIC SURGERY
Payer: COMMERCIAL

## 2020-06-05 VITALS
DIASTOLIC BLOOD PRESSURE: 60 MMHG | BODY MASS INDEX: 46.61 KG/M2 | OXYGEN SATURATION: 96 % | WEIGHT: 290 LBS | HEART RATE: 66 BPM | HEIGHT: 66 IN | TEMPERATURE: 98 F | SYSTOLIC BLOOD PRESSURE: 125 MMHG

## 2020-06-05 RX ORDER — VITAMIN B COMPLEX
1 CAPSULE ORAL DAILY
COMMUNITY

## 2020-06-05 RX ORDER — PREGABALIN 50 MG/1
50 CAPSULE ORAL ONCE
Status: CANCELLED | OUTPATIENT
Start: 2020-06-05 | End: 2020-06-05

## 2020-06-05 RX ORDER — ACETAMINOPHEN 500 MG
1000 TABLET ORAL
Status: CANCELLED | OUTPATIENT
Start: 2020-06-05 | End: 2020-06-05

## 2020-06-05 RX ORDER — LIDOCAINE HYDROCHLORIDE 10 MG/ML
0.5 INJECTION, SOLUTION EPIDURAL; INFILTRATION; INTRACAUDAL; PERINEURAL ONCE
Status: CANCELLED | OUTPATIENT
Start: 2020-06-05 | End: 2020-06-05

## 2020-06-05 RX ORDER — SODIUM CHLORIDE, SODIUM LACTATE, POTASSIUM CHLORIDE, CALCIUM CHLORIDE 600; 310; 30; 20 MG/100ML; MG/100ML; MG/100ML; MG/100ML
INJECTION, SOLUTION INTRAVENOUS CONTINUOUS
Status: CANCELLED | OUTPATIENT
Start: 2020-06-05

## 2020-06-05 NOTE — ANESTHESIA PREPROCEDURE EVALUATION
06/05/2020  Vicky Alvarado is a 55 y.o., female.    Anesthesia Evaluation       I have reviewed the Medications.     Review of Systems  Anesthesia Hx:  History of prior surgery of interest to airway management or planning: Previous anesthesia: General 1/20 REENA/cardioversion with general anesthesia. Denies Family Hx of Anesthesia complications.    Social:  Non-Smoker    Hematology/Oncology:  Hematology Normal   Oncology Normal     EENT/Dental:EENT/Dental Normal   Cardiovascular:   Hypertension Denies CAD. (neg PET stress test)   Dysrhythmias (now SR) atrial fibrillation CHF (single episode CHF with onset AFib, EF 40 at the time, now back up to 60%) ECG has been reviewed. Echo 5/20·   Normal left ventricular systolic function. The estimated ejection fraction is 60%.  · Severe left atrial enlargement.  · Grade I (mild) left ventricular diastolic dysfunction consistent with impaired relaxation.  · Normal right ventricular systolic function.  · Mild right atrial enlargement.  · Mild aortic valve stenosis. Aortic valve area is 1.83 cm2; peak velocity is 2.42 m/s; mean gradient is 12 mmHg.  · Mild tricuspid regurgitation.  · Normal central venous pressure (3 mmHg).  · The estimated PA systolic pressure is 22 mmHg.    OK'd to stop eliquis    Pulmonary:  Pulmonary Normal Sleep study scheduled, but postponed due to cardiac w/u   Renal/:  Renal/ Normal     Hepatic/GI:   GERD, well controlled    Musculoskeletal:   Arthritis     Neurological:  Neurology Normal    Endocrine:  Endocrine Normal Diabetes: Pre-DM.    Dermatological:  Skin Normal    Psych:  Psychiatric Normal           Physical Exam  General:  Morbid Obesity      Dental:  Dental Findings: In tact, Upper Dentures, Lower partial dentures         Mental Status:  Mental Status Findings:  Cooperative, Alert and Oriented         Anesthesia Plan  Type of  Anesthesia, risks & benefits discussed:  Anesthesia Type:  general  Patient's Preference:   Intra-op Monitoring Plan: standard ASA monitors  Intra-op Monitoring Plan Comments:   Post Op Pain Control Plan: per primary service following discharge from PACU, multimodal analgesia and peripheral nerve block  Post Op Pain Control Plan Comments:   Induction:   IV  Beta Blocker:         Informed Consent: Patient understands risks and agrees with Anesthesia plan.  Questions answered. Anesthesia consent signed with patient.  ASA Score: 3     Day of Surgery Review of History & Physical:    H&P update referred to the surgeon.     Anesthesia Plan Notes: Labs in [facility] from 2/20 WNL, cardiac studies as above, cleared by PCP        Ready For Surgery From Anesthesia Perspective.

## 2020-06-05 NOTE — DISCHARGE INSTRUCTIONS
Information to Prepare you for your Surgery    PRE-ADMIT TESTING -  [phone]    [address]  [place] [facility]          Your surgery has been scheduled at [facility]. We are pleased to have the opportunity to serve you. For Further Information please call [phone].    On the day of surgery please report to the Information Desk on the 1st floor.    · CONTACT YOUR PHYSICIAN'S OFFICE THE DAY PRIOR TO YOUR SURGERY TO OBTAIN YOUR ARRIVAL TIME.     · The evening before surgery do not eat anything after 9 p.m. ( this includes hard candy, chewing gum and mints).  You may only have GATORADE, POWERADE AND WATER  from 9 p.m. until you leave your home.   DO NOT DRINK ANY LIQUIDS ON THE WAY TO THE HOSPITAL.      SPECIAL MEDICATION INSTRUCTIONS: TAKE medications checked off by the Anesthesiologist on your Medication List.    Angiogram Patients: Take medications as instructed by your physician, including aspirin.     Surgery Patients:    If you take ASPIRIN - Your PHYSICIAN/SURGEON will need to inform you IF/OR when you need to stop taking aspirin prior to your surgery.     Do Not take any medications containing IBUPROFEN.  Do Not Wear any make-up or dark nail polish   (especially eye make-up) to surgery. If you come to surgery with makeup on you will be required to remove the makeup or nail polish.    Do not shave your surgical area at least 5 days prior to your surgery. The surgical prep will be performed at the hospital according to Infection Control regulations.    Leave all valuables at home.   Do Not wear any jewelry or watches, including any metal in body piercings. Jewelry must be removed prior to coming to the hospital.  There is a possibility that rings that are unable to be removed may be cut off if they are on the surgical extremity.    Contact Lens must be removed before surgery. Either do not wear the contact lens or bring a case and solution for  storage.  Please bring a container for eyeglasses or dentures as required.  Bring any paperwork your physician has provided, such as consent forms,  history and physicals, doctor's orders, etc.   Bring comfortable clothes that are loose fitting to wear upon discharge. Take into consideration the type of surgery being performed.  Maintain your diet as advised per your physician the day prior to surgery.      Adequate rest the night before surgery is advised.   Park in the Parking lot behind the hospital or in the [facility] across the street from the parking lot. Parking is complimentary.  If you will be discharged the same day as your procedure, please arrange for a responsible adult to drive you home or to accompany you if traveling by taxi.   YOU WILL NOT BE PERMITTED TO DRIVE OR TO LEAVE THE HOSPITAL ALONE AFTER SURGERY.   It is strongly recommended that you arrange for someone to remain with you for the first 24 hrs following your surgery.    The Surgeon will speak to your family/visitor after your surgery regarding the outcome of your surgery and post op care.  The Surgeon may speak to you after your surgery, but there is a possibility you may not remember the details.  Please check with your family members regarding the conversation with the Surgeon.    We strongly recommend whoever is bringing you home be present for discharge instructions.  This will ensure a thorough understanding for your post op home care.    EACH PATIENT IS ALLOWED TWO FAMILY MEMBERS OR VISITORS IN THE ROOM AND IN THE WAITING ROOMS WHILE YOU ARE IN SURGERY. ALL CHILDREN MUST ALWAYS BE ACCOMPANIED BY AN ADULT.    Thank you for your cooperation.  The Staff of [facility].                Bathing Instructions with Hibiclens     Shower the evening before and morning of your procedure with Hibiclens:   Wash your face with water and your regular face wash/soap   Apply Hibiclens directly on your skin or on a  wet washcloth and wash gently. When showering: Move away from the shower stream when applying Hibiclens to avoid rinsing off too soon.   Rinse thoroughly with warm water   Do not dilute Hibiclens         Dry off as usual, do not use any deodorant, powder, body lotions, perfume, after shave or cologne.

## 2020-06-08 ENCOUNTER — LAB VISIT (OUTPATIENT)
Dept: SURGERY | Facility: CLINIC | Age: 55
End: 2020-06-08
Payer: COMMERCIAL

## 2020-06-08 DIAGNOSIS — Z01.818 PREOP EXAMINATION: ICD-10-CM

## 2020-06-08 LAB — SARS-COV-2 RNA RESP QL NAA+PROBE: NOT DETECTED

## 2020-06-08 PROCEDURE — U0003 INFECTIOUS AGENT DETECTION BY NUCLEIC ACID (DNA OR RNA); SEVERE ACUTE RESPIRATORY SYNDROME CORONAVIRUS 2 (SARS-COV-2) (CORONAVIRUS DISEASE [COVID-19]), AMPLIFIED PROBE TECHNIQUE, MAKING USE OF HIGH THROUGHPUT TECHNOLOGIES AS DESCRIBED BY CMS-2020-01-R: HCPCS

## 2020-06-09 ENCOUNTER — TELEPHONE (OUTPATIENT)
Dept: ORTHOPEDICS | Facility: CLINIC | Age: 55
End: 2020-06-09

## 2020-06-09 NOTE — TELEPHONE ENCOUNTER
Informed patient to be at the surgery center 6/10/20 for    6:30a.m. .Patient verbalized understanding.----- Message from Harper Chong sent at 6/9/2020 10:55 AM CDT -----  Contact: Patient [phone]  Type: Patient Call Back    Who called: Patient    What is the request in detail: Calling to find out the time she's scheduled for surgery on tomorrow, 06-10-20. Please call.     Would the patient rather a call back or a response via [facility]? Call back    Best call back number: [phone]

## 2020-06-10 ENCOUNTER — HOSPITAL ENCOUNTER (OUTPATIENT)
Facility: OTHER | Age: 55
Discharge: HOME | End: 2020-06-10
Attending: ORTHOPAEDIC SURGERY | Admitting: ORTHOPAEDIC SURGERY
Payer: COMMERCIAL

## 2020-06-10 ENCOUNTER — ANESTHESIA (OUTPATIENT)
Dept: SURGERY | Facility: OTHER | Age: 55
End: 2020-06-10
Payer: COMMERCIAL

## 2020-06-10 ENCOUNTER — TELEPHONE (OUTPATIENT)
Dept: ELECTROPHYSIOLOGY | Facility: CLINIC | Age: 55
End: 2020-06-10

## 2020-06-10 VITALS
SYSTOLIC BLOOD PRESSURE: 133 MMHG | BODY MASS INDEX: 46.61 KG/M2 | WEIGHT: 290 LBS | DIASTOLIC BLOOD PRESSURE: 62 MMHG | OXYGEN SATURATION: 94 % | RESPIRATION RATE: 18 BRPM | HEIGHT: 66 IN | TEMPERATURE: 99 F | HEART RATE: 135 BPM

## 2020-06-10 DIAGNOSIS — G56.01 RIGHT CARPAL TUNNEL SYNDROME: Primary | ICD-10-CM

## 2020-06-10 DIAGNOSIS — I48.0 PAF (PAROXYSMAL ATRIAL FIBRILLATION): Primary | ICD-10-CM

## 2020-06-10 PROCEDURE — 63600175 PHARM REV CODE 636 W HCPCS: Performed by: ANESTHESIOLOGY

## 2020-06-10 PROCEDURE — 71000039 HC RECOVERY, EACH ADD'L HOUR: Performed by: ORTHOPAEDIC SURGERY

## 2020-06-10 PROCEDURE — 36000706: Performed by: ORTHOPAEDIC SURGERY

## 2020-06-10 PROCEDURE — 71000033 HC RECOVERY, INTIAL HOUR: Performed by: ORTHOPAEDIC SURGERY

## 2020-06-10 PROCEDURE — 71000015 HC POSTOP RECOV 1ST HR: Performed by: ORTHOPAEDIC SURGERY

## 2020-06-10 PROCEDURE — 25000003 PHARM REV CODE 250: Performed by: ANESTHESIOLOGY

## 2020-06-10 PROCEDURE — P9045 ALBUMIN (HUMAN), 5%, 250 ML: HCPCS | Mod: JG | Performed by: NURSE ANESTHETIST, CERTIFIED REGISTERED

## 2020-06-10 PROCEDURE — 25000003 PHARM REV CODE 250: Performed by: ORTHOPAEDIC SURGERY

## 2020-06-10 PROCEDURE — 64718 PR REVISE ULNAR NERVE AT ELBOW: ICD-10-PCS | Mod: RT,,, | Performed by: ORTHOPAEDIC SURGERY

## 2020-06-10 PROCEDURE — 36000707: Performed by: ORTHOPAEDIC SURGERY

## 2020-06-10 PROCEDURE — 64718 REVISE ULNAR NERVE AT ELBOW: CPT | Mod: RT,,, | Performed by: ORTHOPAEDIC SURGERY

## 2020-06-10 PROCEDURE — 63600175 PHARM REV CODE 636 W HCPCS: Performed by: STUDENT IN AN ORGANIZED HEALTH CARE EDUCATION/TRAINING PROGRAM

## 2020-06-10 PROCEDURE — 64721 CARPAL TUNNEL SURGERY: CPT | Mod: 51,RT,, | Performed by: ORTHOPAEDIC SURGERY

## 2020-06-10 PROCEDURE — 25000003 PHARM REV CODE 250: Performed by: SPECIALIST

## 2020-06-10 PROCEDURE — 37000008 HC ANESTHESIA 1ST 15 MINUTES: Performed by: ORTHOPAEDIC SURGERY

## 2020-06-10 PROCEDURE — 63600175 PHARM REV CODE 636 W HCPCS: Performed by: NURSE ANESTHETIST, CERTIFIED REGISTERED

## 2020-06-10 PROCEDURE — 25000003 PHARM REV CODE 250: Performed by: NURSE ANESTHETIST, CERTIFIED REGISTERED

## 2020-06-10 PROCEDURE — 63600175 PHARM REV CODE 636 W HCPCS

## 2020-06-10 PROCEDURE — 63600175 PHARM REV CODE 636 W HCPCS: Performed by: SPECIALIST

## 2020-06-10 PROCEDURE — 64721 PR REVISE MEDIAN N/CARPAL TUNNEL SURG: ICD-10-PCS | Mod: 51,RT,, | Performed by: ORTHOPAEDIC SURGERY

## 2020-06-10 PROCEDURE — 37000009 HC ANESTHESIA EA ADD 15 MINS: Performed by: ORTHOPAEDIC SURGERY

## 2020-06-10 DEVICE — MATRIX AMNIOCLARIX FLO 50MG: Type: IMPLANTABLE DEVICE | Site: HAND | Status: FUNCTIONAL

## 2020-06-10 RX ORDER — CEFAZOLIN SODIUM 2 G/50ML
2 SOLUTION INTRAVENOUS
Status: DISCONTINUED | OUTPATIENT
Start: 2020-06-10 | End: 2020-06-10

## 2020-06-10 RX ORDER — MIDAZOLAM HYDROCHLORIDE 1 MG/ML
INJECTION INTRAMUSCULAR; INTRAVENOUS
Status: DISCONTINUED | OUTPATIENT
Start: 2020-06-10 | End: 2020-06-10

## 2020-06-10 RX ORDER — SODIUM CHLORIDE 0.9 % (FLUSH) 0.9 %
3 SYRINGE (ML) INJECTION
Status: DISCONTINUED | OUTPATIENT
Start: 2020-06-10 | End: 2020-06-10 | Stop reason: HOSPADM

## 2020-06-10 RX ORDER — PREGABALIN 50 MG/1
50 CAPSULE ORAL ONCE
Status: COMPLETED | OUTPATIENT
Start: 2020-06-10 | End: 2020-06-10

## 2020-06-10 RX ORDER — PHENYLEPHRINE HYDROCHLORIDE 10 MG/ML
INJECTION INTRAVENOUS
Status: DISCONTINUED | OUTPATIENT
Start: 2020-06-10 | End: 2020-06-10

## 2020-06-10 RX ORDER — SODIUM CHLORIDE 9 MG/ML
INJECTION, SOLUTION INTRAVENOUS CONTINUOUS
Status: DISCONTINUED | OUTPATIENT
Start: 2020-06-10 | End: 2021-07-06

## 2020-06-10 RX ORDER — MEPERIDINE HYDROCHLORIDE 25 MG/ML
12.5 INJECTION INTRAMUSCULAR; INTRAVENOUS; SUBCUTANEOUS ONCE AS NEEDED
Status: DISCONTINUED | OUTPATIENT
Start: 2020-06-10 | End: 2020-06-10 | Stop reason: HOSPADM

## 2020-06-10 RX ORDER — ALBUMIN HUMAN 50 G/1000ML
SOLUTION INTRAVENOUS CONTINUOUS PRN
Status: DISCONTINUED | OUTPATIENT
Start: 2020-06-10 | End: 2020-06-10

## 2020-06-10 RX ORDER — EPHEDRINE SULFATE 50 MG/ML
INJECTION, SOLUTION INTRAVENOUS
Status: DISCONTINUED | OUTPATIENT
Start: 2020-06-10 | End: 2020-06-10

## 2020-06-10 RX ORDER — OXYCODONE HYDROCHLORIDE 5 MG/1
5 TABLET ORAL
Status: DISCONTINUED | OUTPATIENT
Start: 2020-06-10 | End: 2020-06-10 | Stop reason: HOSPADM

## 2020-06-10 RX ORDER — DEXAMETHASONE SODIUM PHOSPHATE 4 MG/ML
INJECTION, SOLUTION INTRA-ARTICULAR; INTRALESIONAL; INTRAMUSCULAR; INTRAVENOUS; SOFT TISSUE
Status: DISCONTINUED | OUTPATIENT
Start: 2020-06-10 | End: 2020-06-10

## 2020-06-10 RX ORDER — OXYCODONE HYDROCHLORIDE 5 MG/1
TABLET ORAL
Status: DISCONTINUED
Start: 2020-06-10 | End: 2020-06-10 | Stop reason: HOSPADM

## 2020-06-10 RX ORDER — ONDANSETRON HYDROCHLORIDE 2 MG/ML
INJECTION, SOLUTION INTRAMUSCULAR; INTRAVENOUS
Status: DISCONTINUED | OUTPATIENT
Start: 2020-06-10 | End: 2020-06-10

## 2020-06-10 RX ORDER — KETAMINE HCL IN 0.9 % NACL 50 MG/5 ML
SYRINGE (ML) INTRAVENOUS
Status: DISCONTINUED | OUTPATIENT
Start: 2020-06-10 | End: 2020-06-10

## 2020-06-10 RX ORDER — ACETAMINOPHEN 500 MG
1000 TABLET ORAL
Status: COMPLETED | OUTPATIENT
Start: 2020-06-10 | End: 2020-06-10

## 2020-06-10 RX ORDER — SODIUM CHLORIDE, SODIUM LACTATE, POTASSIUM CHLORIDE, CALCIUM CHLORIDE 600; 310; 30; 20 MG/100ML; MG/100ML; MG/100ML; MG/100ML
INJECTION, SOLUTION INTRAVENOUS CONTINUOUS
Status: DISCONTINUED | OUTPATIENT
Start: 2020-06-10 | End: 2020-06-10 | Stop reason: HOSPADM

## 2020-06-10 RX ORDER — CEFAZOLIN SODIUM 1 G/3ML
2 INJECTION, POWDER, FOR SOLUTION INTRAMUSCULAR; INTRAVENOUS
Status: COMPLETED | OUTPATIENT
Start: 2020-06-10 | End: 2020-06-10

## 2020-06-10 RX ORDER — PROPOFOL 10 MG/ML
VIAL (ML) INTRAVENOUS
Status: DISCONTINUED | OUTPATIENT
Start: 2020-06-10 | End: 2020-06-10

## 2020-06-10 RX ORDER — VASOPRESSIN 20 [USP'U]/ML
INJECTION, SOLUTION INTRAMUSCULAR; SUBCUTANEOUS
Status: DISCONTINUED | OUTPATIENT
Start: 2020-06-10 | End: 2020-06-10

## 2020-06-10 RX ORDER — FENTANYL CITRATE 50 UG/ML
INJECTION, SOLUTION INTRAMUSCULAR; INTRAVENOUS
Status: DISCONTINUED | OUTPATIENT
Start: 2020-06-10 | End: 2020-06-10

## 2020-06-10 RX ORDER — ATROPINE SULFATE 0.4 MG/ML
INJECTION, SOLUTION ENDOTRACHEAL; INTRAMEDULLARY; INTRAMUSCULAR; INTRAVENOUS; SUBCUTANEOUS
Status: DISCONTINUED | OUTPATIENT
Start: 2020-06-10 | End: 2020-06-10

## 2020-06-10 RX ORDER — LIDOCAINE HYDROCHLORIDE 10 MG/ML
0.5 INJECTION, SOLUTION EPIDURAL; INFILTRATION; INTRACAUDAL; PERINEURAL ONCE
Status: DISCONTINUED | OUTPATIENT
Start: 2020-06-10 | End: 2020-06-10 | Stop reason: HOSPADM

## 2020-06-10 RX ORDER — ONDANSETRON 2 MG/ML
4 INJECTION INTRAMUSCULAR; INTRAVENOUS DAILY PRN
Status: DISCONTINUED | OUTPATIENT
Start: 2020-06-10 | End: 2020-06-10 | Stop reason: HOSPADM

## 2020-06-10 RX ORDER — BACITRACIN ZINC 500 UNIT/G
OINTMENT (GRAM) TOPICAL
Status: DISCONTINUED | OUTPATIENT
Start: 2020-06-10 | End: 2020-06-10 | Stop reason: HOSPADM

## 2020-06-10 RX ORDER — DIPHENHYDRAMINE HYDROCHLORIDE 50 MG/ML
25 INJECTION INTRAMUSCULAR; INTRAVENOUS EVERY 6 HOURS PRN
Status: DISCONTINUED | OUTPATIENT
Start: 2020-06-10 | End: 2020-06-10 | Stop reason: HOSPADM

## 2020-06-10 RX ORDER — HYDROMORPHONE HYDROCHLORIDE 2 MG/ML
INJECTION, SOLUTION INTRAMUSCULAR; INTRAVENOUS; SUBCUTANEOUS
Status: COMPLETED
Start: 2020-06-10 | End: 2020-06-10

## 2020-06-10 RX ORDER — GLYCOPYRROLATE 0.2 MG/ML
INJECTION INTRAMUSCULAR; INTRAVENOUS
Status: DISCONTINUED | OUTPATIENT
Start: 2020-06-10 | End: 2020-06-10

## 2020-06-10 RX ORDER — HYDROMORPHONE HYDROCHLORIDE 2 MG/ML
0.4 INJECTION, SOLUTION INTRAMUSCULAR; INTRAVENOUS; SUBCUTANEOUS EVERY 5 MIN PRN
Status: DISCONTINUED | OUTPATIENT
Start: 2020-06-10 | End: 2020-06-10 | Stop reason: HOSPADM

## 2020-06-10 RX ADMIN — EPHEDRINE SULFATE 10 MG: 50 INJECTION INTRAMUSCULAR; INTRAVENOUS; SUBCUTANEOUS at 09:06

## 2020-06-10 RX ADMIN — HYDROMORPHONE HYDROCHLORIDE 0.4 MG: 2 INJECTION, SOLUTION INTRAMUSCULAR; INTRAVENOUS; SUBCUTANEOUS at 10:06

## 2020-06-10 RX ADMIN — DEXAMETHASONE SODIUM PHOSPHATE 8 MG: 4 INJECTION, SOLUTION INTRAMUSCULAR; INTRAVENOUS at 09:06

## 2020-06-10 RX ADMIN — VASOPRESSIN 2 UNITS: 20 INJECTION, SOLUTION INTRAMUSCULAR; SUBCUTANEOUS at 09:06

## 2020-06-10 RX ADMIN — HYDROMORPHONE HYDROCHLORIDE 0.4 MG: 2 INJECTION, SOLUTION INTRAMUSCULAR; INTRAVENOUS; SUBCUTANEOUS at 11:06

## 2020-06-10 RX ADMIN — PREGABALIN 50 MG: 50 CAPSULE ORAL at 07:06

## 2020-06-10 RX ADMIN — CEFAZOLIN 2 G: 330 INJECTION, POWDER, FOR SOLUTION INTRAMUSCULAR; INTRAVENOUS at 09:06

## 2020-06-10 RX ADMIN — EPHEDRINE SULFATE 15 MG: 50 INJECTION INTRAMUSCULAR; INTRAVENOUS; SUBCUTANEOUS at 09:06

## 2020-06-10 RX ADMIN — FENTANYL CITRATE 100 MCG: 50 INJECTION, SOLUTION INTRAMUSCULAR; INTRAVENOUS at 09:06

## 2020-06-10 RX ADMIN — ALBUMIN (HUMAN): 12.5 SOLUTION INTRAVENOUS at 09:06

## 2020-06-10 RX ADMIN — MIDAZOLAM HYDROCHLORIDE 2 MG: 1 INJECTION, SOLUTION INTRAMUSCULAR; INTRAVENOUS at 09:06

## 2020-06-10 RX ADMIN — Medication 25 MG: at 09:06

## 2020-06-10 RX ADMIN — VASOPRESSIN 4 UNITS: 20 INJECTION, SOLUTION INTRAMUSCULAR; SUBCUTANEOUS at 09:06

## 2020-06-10 RX ADMIN — OXYCODONE 5 MG: 5 TABLET ORAL at 10:06

## 2020-06-10 RX ADMIN — VASOPRESSIN 2 UNITS: 20 INJECTION, SOLUTION INTRAMUSCULAR; SUBCUTANEOUS at 10:06

## 2020-06-10 RX ADMIN — PHENYLEPHRINE HYDROCHLORIDE 200 MCG: 10 INJECTION INTRAVENOUS at 09:06

## 2020-06-10 RX ADMIN — ONDANSETRON 4 MG: 2 INJECTION, SOLUTION INTRAMUSCULAR; INTRAVENOUS at 09:06

## 2020-06-10 RX ADMIN — PROPOFOL 200 MG: 10 INJECTION, EMULSION INTRAVENOUS at 09:06

## 2020-06-10 RX ADMIN — CARBOXYMETHYLCELLULOSE SODIUM 2 DROP: 2.5 SOLUTION/ DROPS OPHTHALMIC at 09:06

## 2020-06-10 RX ADMIN — GLYCOPYRROLATE 0.2 MG: 0.2 INJECTION, SOLUTION INTRAMUSCULAR; INTRAVENOUS at 09:06

## 2020-06-10 RX ADMIN — SODIUM CHLORIDE, SODIUM LACTATE, POTASSIUM CHLORIDE, AND CALCIUM CHLORIDE: 600; 310; 30; 20 INJECTION, SOLUTION INTRAVENOUS at 08:06

## 2020-06-10 RX ADMIN — ATROPINE SULFATE 0.4 MG: 0.4 INJECTION, SOLUTION INTRAMUSCULAR; INTRAVENOUS; SUBCUTANEOUS at 09:06

## 2020-06-10 RX ADMIN — ACETAMINOPHEN 1000 MG: 500 TABLET, FILM COATED ORAL at 07:06

## 2020-06-10 NOTE — ANESTHESIA POSTPROCEDURE EVALUATION
Anesthesia Post Evaluation    Patient: Vicky Alvarado    Procedure(s) Performed: Procedure(s) (LRB):  RELEASE, CARPAL TUNNEL - RIGHT (Right)  DECOMPRESSION, NERVE, ULNAR RIGHT ELBOW  -  CPT 85116 / 29657 (Right)    Final Anesthesia Type: general    Patient location during evaluation: PACU  Patient participation: Yes- Able to Participate  Level of consciousness: awake and alert  Post-procedure vital signs: reviewed and stable  Pain management: adequate  Airway patency: patent    PONV status at discharge: No PONV  Anesthetic complications: no      Cardiovascular status: blood pressure returned to baseline  Respiratory status: unassisted, spontaneous ventilation and room air  Hydration status: euvolemic  Follow-up not needed.          Vitals Value Taken Time   /62 6/10/2020 12:20 PM   Temp 37.1 °C (98.8 °F) 6/10/2020 11:50 AM   Pulse 135 6/10/2020 12:20 PM   Resp 18 6/10/2020 12:20 PM   SpO2 94 % 6/10/2020 12:20 PM         Event Time     Out of Recovery 11:45:32          Pain/Tolu Score: Pain Rating Prior to Med Admin: 6 (6/10/2020 11:14 AM)  Pain Rating Post Med Admin: 4 (6/10/2020 11:25 AM)  Tolu Score: 10 (6/10/2020 12:20 PM)

## 2020-06-10 NOTE — TRANSFER OF CARE
"Anesthesia Transfer of Care Note    Patient: Vicky Alvarado    Procedure(s) Performed: Procedure(s) (LRB):  RELEASE, CARPAL TUNNEL - RIGHT (Right)  DECOMPRESSION, NERVE, ULNAR RIGHT ELBOW  -  CPT 25371 / 78532 (Right)    Patient location: PACU    Anesthesia Type: general    Transport from OR: Transported from OR on 2-3 L/min O2 by NC with adequate spontaneous ventilation    Post pain: adequate analgesia    Post assessment: no apparent anesthetic complications and tolerated procedure well    Post vital signs: stable    Level of consciousness: awake, alert and oriented    Nausea/Vomiting: no nausea/vomiting    Complications: none    Transfer of care protocol was followed      Last vitals:   Visit Vitals  BP (!) 115/57   Pulse 110   Temp 36.9 °C (98.5 °F) (Oral)   Resp 16   Ht 5' 6" (1.676 m)   Wt 131.5 kg (290 lb)   LMP 06/08/2015   SpO2 100%   Breastfeeding? No   BMI 46.81 kg/m²     "

## 2020-06-10 NOTE — OP NOTE
[facility]  Surgery Department  Operative Note    SUMMARY     Date of Procedure: 6/10/2020     Procedure: Procedure(s) (LRB):  RELEASE, CARPAL TUNNEL - RIGHT (Right)  DECOMPRESSION, NERVE, ULNAR RIGHT ELBOW  -  CPT 02147 / 24808 (Right)     Surgeon(s) and Role:     * Adelaida Hall MD - Primary    Assisting Surgeon: None    Pre-Operative Diagnosis: Carpal tunnel syndrome on right [G56.01]  Cubital tunnel syndrome on right [G56.21]    Post-Operative Diagnosis: Post-Op Diagnosis Codes:     * Carpal tunnel syndrome on right [G56.01]     * Cubital tunnel syndrome on right [G56.21]    Anesthesia: General    Technical Procedures Used: surgery    Description of the Findings of the Procedure:  Indication for procedure Mrs. Alvarado is a 55-year-old female with signs of peripheral nerve compression on her carpal tunnel as well as her ulnar nerve it wakes her up every night painful numbness and tingling examination positive after much discussion with the patient elected for surgical intervention risks and benefits were explained to the patient in clinic consents were signed in clinic    Procedure in detail the correct site was marked with the patient's participation the holding area patient was brought to the operating placed supine position underwent general anesthesia her right upper extremities prepped draped normal sterile fashion time-out was conducted for the correct procedure to be indicated IV antibiotics given patient preoperatively once a time-out was conducted the arm was elevated same weighted tourniquet insufflated 250 mmHg our attention was 1st turned to the ulnar nerve incision was made just anterior to the medial epicondyle incision was made careful dissection down to the ulnar nerve was maintain all the while protecting the medial antebrachial cutaneous nerve of note there was significant scarring around the nerve specifically at the cubital tunnel and proximal to it was very rigid  and thickened nature he was completely decompressed throughout the tunnel proximal and distal including the fascia of the FCU skin hemostat was passed to confirm a complete release the elbow was placed through range of motion showed and the nerve did not sublux out of the nerve but of note the nerve did appear very fatty in nature but not necessarily as hyperemic with the amount of scarring that was present areas irrigated copious amounts normal saline clear X injectable Vicryl Monocryl Dermabond closed the skin attention is turned the carpal tunnel incision was made using Carballo's cardinal lines dissection down to the palmar fascia palmar fascia was sharply incised transverse ligament was then identified it was sharply incised all the while protecting the neurovascular branches seed hemostat was passed on the undersurface of the transverse ligament to free it from the median nerve Metzenbaum scissors were then utilized to cut the remainder of the transverse ligament proximally and distally skin hemostat was passed once again to confirm complete release the areas irrigated copious amounts normal saline nylon closed the skin sterile dressing was applied tourniquet was deflated brisk cap refill sued patient was placed in a well-padded long-arm posterior splint tolerated suture was brought to cover area in stable condition    Postop plans patient keep the dressing clean dry and intact will see the patient back at 2 weeks time therapy to be initiated that time after suture removal of note patient went back into AFib postoperatively she will follow-up with cardiology postoperatively she will start her Eliquis same day    Motor and sensation ulnar nerve checked and it was intact    Significant Surgical Tasks Conducted by the Assistant(s), if Applicable: retraction    Complications: No    Estimated Blood Loss (EBL): * No values recorded between 6/10/2020  9:49 AM and 6/10/2020 10:39 AM *           Implants:   Implant Name  Type Inv. Item Serial No.  Lot No. LRB No. Used   [id number]  [employer] AMNIOCLARIX JESE 50MG [id number] [employer] [id number] 1       Specimens:   Specimen (12h ago, onward)    None                  Condition: Good    Disposition: Pt went back into afib postop    Attestation: I performed the procedure.    Discharge Note    SUMMARY     Admit Date: 6/10/2020    Discharge Date and Time: No discharge date for patient encounter.    Hospital Course (synopsis of major diagnoses, care, treatment, and services provided during the course of the hospital stay): surgery     Final Diagnosis: Post-Op Diagnosis Codes:     * Carpal tunnel syndrome on right [G56.01]     * Cubital tunnel syndrome on right [G56.21]    Disposition: Home or Self Care    Follow Up/Patient Instructions:     Medications:  Reconciled Home Medications:      Medication List      CHANGE how you take these medications    ELIQUIS 5 mg Tab  Generic drug:  apixaban  Take 1 tablet (5 mg total) by mouth 2 (two) times daily.  What changed:  additional instructions     metoprolol succinate 50 MG 24 hr tablet  Commonly known as:  TOPROL-XL  Take 2 tablets (100 mg total) by mouth once daily.  What changed:    · how much to take  · when to take this        CONTINUE taking these medications    b complex vitamins capsule  Take 1 capsule by mouth once daily.     DULoxetine 60 MG capsule  Commonly known as:  CYMBALTA  Take 2 capsules (120 mg total) by mouth once daily.     bkzvsfoi-fmja-hdm6-C-radha-bosw 750 mg-644 mg- 30 mg-1 mg Tab  Take 1 tablet by mouth once daily.     hydroCHLOROthiazide 50 MG tablet  Commonly known as:  HYDRODIURIL  Take 1 tablet (50 mg total) by mouth once daily.     HYDROcodone-acetaminophen 5-325 mg per tablet  Commonly known as:  NORCO  Take 1 tablet by mouth every 6 (six) hours as needed for Pain.     lisinopriL 40 MG tablet  Commonly known as:  PRINIVIL,ZESTRIL  Take 1 tablet (40 mg total) by mouth once  daily.     MELATONIN ORAL  Take 1-2 tablets by mouth nightly as needed (Sleep).     multivitamin per tablet  Commonly known as:  THERAGRAN  Take 1 tablet by mouth once daily.     omega-3 fatty acids-vitamin E 1,000 mg Cap     omeprazole 20 MG capsule  Commonly known as:  PRILOSEC  TAKE 1 CAPSULE BY MOUTH ONCE DAILY     traZODone 100 MG tablet  Commonly known as:  DESYREL  Take 1 tablet (100 mg total) by mouth nightly as needed for Insomnia.          Discharge Procedure Orders   Call MD for:  temperature >100.4     Call MD for:  persistent nausea and vomiting or diarrhea     Call MD for:  severe uncontrolled pain     Call MD for:  redness, tenderness, or signs of infection (pain, swelling, redness, odor or green/yellow discharge around incision site)     Call MD for:  difficulty breathing or increased cough     Call MD for:  severe persistent headache     Call MD for:  worsening rash     Call MD for:  persistent dizziness, light-headedness, or visual disturbances     Call MD for:  increased confusion or weakness     Leave dressing on - Keep it clean, dry, and intact until clinic visit     Weight bearing restrictions (specify):   Order Comments: No lifting with left arm. Move fingers to prevent stiffness.     Follow-up Information     Adelaida Hall MD. Go on 6/23/2020.    Specialties:  Hand Surgery, Orthopedic Surgery  Why:  For wound re-check; 11:30 am  Contact information:  [address]  [address]  [facility]  [facility] [id number] [phone]

## 2020-06-10 NOTE — PLAN OF CARE
Vicky Rachel Christiano has met all discharge criteria from Phase II. Vital Signs are stable, ambulating  without difficulty. Discharge instructions given, patient verbalized understanding. Discharged from facility via wheelchair in stable condition.

## 2020-06-10 NOTE — TELEPHONE ENCOUNTER
----- Message from Marah Hunter NP sent at 6/10/2020  3:30 PM CDT -----  Contact: self  I don't have appts available tomorrow and am out friday. Please get her an EKG appt. If she is in AF then we will need to schedule REENA/cardioversion. If she feels bad she needs to go to ED.    ----- Message -----  From: Danica Tenorio RN  Sent: 6/10/2020   3:22 PM CDT  To: MARISA Payton,     Please advise.     Thanks,   Danica   ----- Message -----  From: Indu Tillman MA  Sent: 6/10/2020   3:08 PM CDT  To: Danica Tenorio RN        ----- Message -----  From: Rita Reynoso MA  Sent: 6/10/2020   2:04 PM CDT  To: Dale Crystal Staff    Pt had wrist and elbow surgery this am today Wed. @ [facility],then went to recovery after surgery and got A..Fib in recovery. She's home now wants to know  what to do?.Marah Hunter,last visit 3-9-20. Please call

## 2020-06-10 NOTE — BRIEF OP NOTE
[facility]  Brief Operative Note    Surgery Date: 6/10/2020     Surgeon(s) and Role:     * Adelaida Hall MD - Primary    Assisting Surgeon: None    Pre-op Diagnosis:  Carpal tunnel syndrome on right [G56.01]  Cubital tunnel syndrome on right [G56.21]    Post-op Diagnosis:  Post-Op Diagnosis Codes:     * Carpal tunnel syndrome on right [G56.01]     * Cubital tunnel syndrome on right [G56.21]    Procedure(s) (LRB):  RELEASE, CARPAL TUNNEL - RIGHT (Right)  DECOMPRESSION, NERVE, ULNAR RIGHT ELBOW  -  CPT 52033 / 73671 (Right)    Anesthesia: General    Description of the findings of the procedure(s):   RELEASE, CARPAL TUNNEL - RIGHT (Right)  DECOMPRESSION, NERVE, ULNAR RIGHT ELBOW     Specimens:   Specimen (12h ago, onward)    None            Discharge Note    OUTCOME: Patient tolerated treatment/procedure well without complication and is now ready for discharge.    DISPOSITION: Home or Self Care    FINAL DIAGNOSIS:  Right carpal tunnel syndrome    FOLLOWUP: In clinic    DISCHARGE INSTRUCTIONS:    Discharge Procedure Orders   Call MD for:  temperature >100.4     Call MD for:  persistent nausea and vomiting or diarrhea     Call MD for:  severe uncontrolled pain     Call MD for:  redness, tenderness, or signs of infection (pain, swelling, redness, odor or green/yellow discharge around incision site)     Call MD for:  difficulty breathing or increased cough     Call MD for:  severe persistent headache     Call MD for:  worsening rash     Call MD for:  persistent dizziness, light-headedness, or visual disturbances     Call MD for:  increased confusion or weakness     Leave dressing on - Keep it clean, dry, and intact until clinic visit     Weight bearing restrictions (specify):   Order Comments: No lifting with left arm. Move fingers to prevent stiffness.

## 2020-06-10 NOTE — ANESTHESIA PROCEDURE NOTES
Intubation  Performed by: Samantha Harmon CRNA  Authorized by: Zack Morin MD     Intubation:     Induction:  Intravenous    Intubated:  Postinduction    Mask Ventilation:  Easy mask    Attempts:  1    Attempted By:  CRNA    Method of Intubation:  Direct    Difficult Airway Encountered?: No      Complications:  None    Airway Device:  Supraglottic airway/LMA (air Q)    Airway Device Size:  4.5    Style/Cuff Inflation:  Cuffed    Inflation Amount (mL):  10    Secured at:  The lips    Placement Verified By:  Capnometry    Complicating Factors:  None    Findings Post-Intubation:  BS equal bilateral and atraumatic/condition of teeth unchanged

## 2020-06-11 ENCOUNTER — HOSPITAL ENCOUNTER (OUTPATIENT)
Dept: CARDIOLOGY | Facility: CLINIC | Age: 55
Discharge: HOME | End: 2020-06-11
Payer: COMMERCIAL

## 2020-06-11 DIAGNOSIS — I48.0 PAF (PAROXYSMAL ATRIAL FIBRILLATION): ICD-10-CM

## 2020-06-11 PROCEDURE — 93010 ELECTROCARDIOGRAM REPORT: CPT | Mod: S$GLB,,, | Performed by: INTERNAL MEDICINE

## 2020-06-11 PROCEDURE — 93005 ELECTROCARDIOGRAM TRACING: CPT | Mod: S$GLB,,, | Performed by: NURSE PRACTITIONER

## 2020-06-11 PROCEDURE — 93010 RHYTHM STRIP: ICD-10-PCS | Mod: S$GLB,,, | Performed by: INTERNAL MEDICINE

## 2020-06-11 PROCEDURE — 93005 RHYTHM STRIP: ICD-10-PCS | Mod: S$GLB,,, | Performed by: NURSE PRACTITIONER

## 2020-06-15 ENCOUNTER — OFFICE VISIT (OUTPATIENT)
Dept: SLEEP MEDICINE | Facility: CLINIC | Age: 55
End: 2020-06-15
Payer: COMMERCIAL

## 2020-06-15 DIAGNOSIS — I48.91 ATRIAL FIBRILLATION WITH RVR: ICD-10-CM

## 2020-06-15 DIAGNOSIS — G47.30 SLEEP APNEA, UNSPECIFIED TYPE: ICD-10-CM

## 2020-06-15 PROCEDURE — 99203 PR OFFICE/OUTPT VISIT, NEW, LEVL III, 30-44 MIN: ICD-10-PCS | Mod: 95,,, | Performed by: PSYCHIATRY & NEUROLOGY

## 2020-06-15 PROCEDURE — 99203 OFFICE O/P NEW LOW 30 MIN: CPT | Mod: 95,,, | Performed by: PSYCHIATRY & NEUROLOGY

## 2020-06-15 NOTE — PROGRESS NOTES
The patient location is: home  The chief complaint leading to consultation is: initial evaluation  Visit type: audiovisual  Total time spent with patient: 30 min  Each patient to whom he or she provides medical services by telemedicine is:  (1) informed of the relationship between the physician and patient and the respective role of any other health care provider with respect to management of the patient; and (2) notified that he or she may decline to receive medical services by telemedicine and may withdraw from such care at any time.  She is accompanied by her cat, Divya to this telemedicine appointment.        Vicky Alvarado  was seen at the request of  Irina Cantu MD for sleep evaluation.    06/15/2020 INITIAL HISTORY OF PRESENT ILLNESS:  Vicky Alvarado is a 55 y.o. female is here to be evaluated for a sleep disorder.       CHIEF COMPLAINT:      In January she had a new onset AFIB - her cardiologist  referred her for sleep apnea evaluation.  She hasd a cardioversion in January. She went into AFIb for the first time since in the recovery room post op on the arm. Waking  3-4 times per night, but no trouble falling asleep. Taking 100 mg Trazodone for insomnia.  She works in [employer] as a nurse.     The patient's complaints include excessive daytime sleepiness, excessive daytime fatigue, mild snoring and interrupted sleep. Vicky Alvarado denied   witnessed breathing pauses and  gasping for air in sleep. She sleeps by herself, and does not usually get the feedback.  She would wake up with headache - better as the day  Goes by.      Denies difficulty falling and staying asleep.     Denies symptoms concerning for parasomnia except for occasional somniloquy.  she Denies cataplexy, sleep paralysis, hallucinations surrounding sleep time.     Reports leg cramps.      No flowsheet data found.        SLEEP ROUTINE AND LIFESTYLE 06/15/2020 :    7 PM -7:30 Pm  SL: 20 min  Awakening 3-4 times - goes right  back to sleep  WT:  Wakes up by 4:30 AM to get to work (she is a nurse in [employer])       PREVIOUS SLEEP STUDIES:   none        Social History:  Occupation:yes  Bed partner: no  Exercise routine: no  Caffeine:  Coffee   In Am and occasionally in the afternoon; no cokes.  Alcohol: no  Smoking:yes - quit a year ago.    DME:       PAST MEDICAL HISTORY:    Active Ambulatory Problems     Diagnosis Date Noted    Opiate dependence 06/22/2015    Opioid dependence in remission     Hypertension 10/12/2015    Chronic systolic heart failure 01/28/2020    Atrial Fibrillation (paroxysmal) 02/26/2020    Cardiomyopathy 02/26/2020    Avascular necrosis of lunate 10/12/2015    Body mass index (BMI) 45.0-49.9, adult 10/12/2015    History of opioid abuse 10/12/2015    Severe obesity (BMI >= 40) 10/12/2015    Pre-diabetes 06/03/2020    Right carpal tunnel syndrome 06/10/2020     Resolved Ambulatory Problems     Diagnosis Date Noted    Atrial fibrillation with RVR 01/28/2020     Past Medical History:   Diagnosis Date    Atrial fibrillation     Avascular necrosis     Depression      of psychiatric care     Psychiatric problem                 PAST SURGICAL HISTORY:    Past Surgical History:   Procedure Laterality Date    CARPAL TUNNEL RELEASE Right 6/10/2020    Procedure: RELEASE, CARPAL TUNNEL - RIGHT;  Surgeon: Adelaida Hall MD;  [facility];  Service: Orthopedics;  Laterality: Right;  GENERAL AND REGIONAL    FOOT SURGERY      TREATMENT OF CARDIAC ARRHYTHMIA N/A 1/29/2020    Procedure: CARDIOVERSION;  Surgeon: Gabriel Hawley MD;  Location: [facility] EP LAB;  Service: Cardiology;  Laterality: N/A;  af, demi, dccv, anes, mb, 345         FAMILY HISTORY:                Family History   Problem Relation Age of Onset    Cataracts Mother     Hypertension Mother     Thyroid disease Mother     Diabetes Father     Hypertension Father     Hypertension Sister     Hypertension Brother     Amblyopia Neg  Hx     Blindness Neg Hx     Cancer Neg Hx     Glaucoma Neg Hx     Macular degeneration Neg Hx     Retinal detachment Neg Hx     Strabismus Neg Hx     Stroke Neg Hx        SOCIAL HISTORY:          Tobacco:   Social History     Tobacco Use   Smoking Status Former Smoker    Types: Cigarettes    Quit date: 2019    Years since quittin.9   Smokeless Tobacco Never Used       alcohol use:    Social History     Substance and Sexual Activity   Alcohol Use No                   ALLERGIES:  Review of patient's allergies indicates:  No Known Allergies    CURRENT MEDICATIONS:    Current Outpatient Medications   Medication Sig Dispense Refill    apixaban (ELIQUIS) 5 mg Tab Take 1 tablet (5 mg total) by mouth 2 (two) times daily. (Patient taking differently: Take 5 mg by mouth 2 (two) times daily. Will stop 2 days prior ) 60 tablet 3    b complex vitamins capsule Take 1 capsule by mouth once daily.      DULoxetine (CYMBALTA) 60 MG capsule Take 2 capsules (120 mg total) by mouth once daily. 180 capsule 3    gluc-shikha-msm#7-W-tmaw-reymundo-bor 750 mg-644 mg- 30 mg-1 mg Tab Take 1 tablet by mouth once daily.       hydroCHLOROthiazide (HYDRODIURIL) 50 MG tablet Take 1 tablet (50 mg total) by mouth once daily. 90 tablet 5    HYDROcodone-acetaminophen (NORCO) 5-325 mg per tablet Take 1 tablet by mouth every 6 (six) hours as needed for Pain. 20 tablet 0    lisinopriL (PRINIVIL,ZESTRIL) 40 MG tablet Take 1 tablet (40 mg total) by mouth once daily. 90 tablet 5    MELATONIN ORAL Take 1-2 tablets by mouth nightly as needed (Sleep).      metoprolol succinate (TOPROL-XL) 50 MG 24 hr tablet Take 2 tablets (100 mg total) by mouth once daily. (Patient taking differently: Take 50 mg by mouth 2 (two) times daily. ) 30 tablet 5    multivitamin (THERAGRAN) per tablet Take 1 tablet by mouth once daily.      omega-3 fatty acids-vitamin E 1,000 mg Cap       omeprazole (PRILOSEC) 20 MG capsule TAKE 1 CAPSULE BY MOUTH ONCE DAILY  "90 capsule 3    traZODone (DESYREL) 100 MG tablet Take 1 tablet (100 mg total) by mouth nightly as needed for Insomnia. 90 tablet 3     No current facility-administered medications for this visit.      Facility-Administered Medications Ordered in Other Visits   Medication Dose Route Frequency Provider Last Rate Last Dose    0.9%  NaCl infusion   Intravenous Continuous Derrick Dior MD                          REVIEW OF SYSTEMS:   Sleep related symptoms as per HPI     denies weight gain - lost 25 lbs since last year  Denies dyspnea  Denies palpitations  Reports acid reflux   Denied nocturia  Denies  mood diturbance  No  anemia  Reports  Arm, hand pain  Denies  Gait imbalance    Otherwise, a balance of 10 systems reviewed is negative.    PHYSICAL EXAM:  LMP 06/08/2015         Using [facility]: yes      ASSESSMENT:    1. Sleep Apnea NEC. The patient symptomatically has  excessive daytime sleepiness, snoring, excessive daytime fatigue, gasping for air in sleep and interrupted sleep  with exam findings of "a crowded oral airway and elevated body mass index. The patient has medical co-morbidities of atrial fibrillation,  which can be worsened by ERENDIRA. This warrants further investigation for possible obstructive sleep apnea.    2. Concern for PLMD; has muscle cramps.      PLAN:    Diagnostic: Polysomnogram in lab due to need to watch for PLMS. The nature of this procedure and its indication was discussed with the patient. she would  like to come discuss PSG results.    Weight loss strategies were discussed in detail      More than 15 minutes of this 30 minutes visit was spent in counseling: and coordination of care.     during our discussion today, we talked about the etiology of  ERENDIRA as well as the potential ramifications of untreated sleep apnea, which could include daytime sleepiness, hypertension, heart disease and/or stroke.  We discussed potential treatment options, which could include weight loss, body " positioning, continuous positive airway pressure (CPAP), or referral for surgical consideration. Meanwhile, she  is urged to avoid supine sleep, weight gain and alcoholic beverages since all of these can worsen ERENDIRA.     Precautions: The patient was advised to abstain from driving should he feel sleepy or drowsy.    Follow up: MD after the sleep study has been completed.     Thank you for allowing me the opportunity to participate in the care of your patient.    This visit summary will be sent to referring provider via inbasket

## 2020-06-15 NOTE — PATIENT INSTRUCTIONS
SLEEP LAB ([facility] or [facility]) will contact you to schedulethe sleep study. Their number is [phone] (ext 2). Please call them if you do not hear from them in 2 weeks from now.  The [facility] Lab is located on 7th floor of the [facility]; [place] [facility] is located in [facility].    SLEEP CLINIC (my assistant) will call you when the sleep study results are ready - if you have not heard from us by 2 weeks from the date of the study, please call [phone] (ext 1) or you can use [facility] to contact me.    You are advised to abstain from driving should you feel sleepy or drowsy.

## 2020-06-15 NOTE — LETTER
Sumaya 15, 2020      Irina Cantu MD  [address]  [facility] [address]           [facility]  [address] [facility] [address]  Phone: [phone]  Fax: [phone]          Patient: Vicky Alvarado   MR Number: [id number]   YOB: 1965   Date of Visit: 6/15/2020       Dear Dr. Irina Cantu:    Thank you for referring Vicky Alvarado to me for evaluation. Attached you will find relevant portions of my assessment and plan of care.    If you have questions, please do not hesitate to call me. I look forward to following Vicky Alvarado along with you.    Sincerely,    Gunjan Doty MD    Enclosure  CC:  No Recipients    If you would like to receive this communication electronically, please contact [email or web address] or [phone] to request more information on [employer] Link access.    For providers and/or their staff who would like to refer a patient to [facility], please contact us through our one-stop-shop provider referral line, [facility], at [phone].    If you feel you have received this communication in error or would no longer like to receive these types of communications, please e-mail [email or web address]

## 2020-06-18 RX ORDER — HYDROCODONE BITARTRATE AND ACETAMINOPHEN 5; 325 MG/1; MG/1
1 TABLET ORAL EVERY 6 HOURS PRN
Qty: 12 TABLET | Refills: 0 | Status: SHIPPED | OUTPATIENT
Start: 2020-06-18 | End: 2020-07-07

## 2020-06-22 ENCOUNTER — TELEPHONE (OUTPATIENT)
Dept: SLEEP MEDICINE | Facility: OTHER | Age: 55
End: 2020-06-22

## 2020-06-22 ENCOUNTER — OFFICE VISIT (OUTPATIENT)
Dept: ORTHOPEDICS | Facility: CLINIC | Age: 55
End: 2020-06-22
Payer: COMMERCIAL

## 2020-06-22 VITALS
BODY MASS INDEX: 46.61 KG/M2 | HEIGHT: 66 IN | WEIGHT: 290 LBS | HEART RATE: 63 BPM | DIASTOLIC BLOOD PRESSURE: 72 MMHG | SYSTOLIC BLOOD PRESSURE: 126 MMHG

## 2020-06-22 DIAGNOSIS — M77.11 RIGHT TENNIS ELBOW: ICD-10-CM

## 2020-06-22 DIAGNOSIS — Z98.890 POST-OPERATIVE STATE: Primary | ICD-10-CM

## 2020-06-22 PROCEDURE — 99999 PR PBB SHADOW E&M-EST. PATIENT-LVL IV: ICD-10-PCS | Mod: PBBFAC,,, | Performed by: PHYSICIAN ASSISTANT

## 2020-06-22 PROCEDURE — 99999 PR PBB SHADOW E&M-EST. PATIENT-LVL IV: CPT | Mod: PBBFAC,,, | Performed by: PHYSICIAN ASSISTANT

## 2020-06-22 PROCEDURE — 99024 PR POST-OP FOLLOW-UP VISIT: ICD-10-PCS | Mod: S$GLB,,, | Performed by: PHYSICIAN ASSISTANT

## 2020-06-22 PROCEDURE — 99024 POSTOP FOLLOW-UP VISIT: CPT | Mod: S$GLB,,, | Performed by: PHYSICIAN ASSISTANT

## 2020-06-22 NOTE — PROGRESS NOTES
"Ms. Alvarado is here today for a post-operative visit.  She is 12 days status post right carpal tunnel release and right ulnar nerve decompression at the elbow by Dr. Hall on 6/10/20. She reports that she is doing well. Her post op dressing got wet yesterday, she removed this, cleaned the hand with soap and water and re-dressed it.  Pain is minimal.  She is taking Tylenol prn. She did follow up with her cardiologist following surgery for afib.  She denies fever, chills, and sweats since the time of the surgery.     Physical exam:    Vitals:    06/22/20 1135   BP: 126/72   Pulse: 63   Weight: 131.5 kg (290 lb)   Height: 5' 6" (1.676 m)   PainSc: 0-No pain     Vital signs are stable, patient is afebrile.  Patient is well dressed and well groomed, no acute distress.  Alert and oriented to person, place, and time.  Post op dressing taken down.  Incisions are clean, dry and intact.  There is no erythema or exudate.  There is no sign of any infection. She is NVI. Sutures removed without difficulty. Good ROM.      Assessment: status post right carpal tunnel release and right ulnar nerve decompression at the elbow by Dr. Hall on 6/10/20    Plan:  Vicky was seen today for post-op evaluation.    Diagnoses and all orders for this visit:    Post-operative state  -     Ambulatory referral/consult to Physical/Occupational Therapy; Future    Right tennis elbow    - PO instruction reviewed and provided to patient  -OT ordered  -ACE applied to elbow and brace applied to wrist   -RTC 4 weeks with Dr. Hall- She is interested in discussing potential surgery for left ivone at this time.       "

## 2020-06-26 ENCOUNTER — CLINICAL SUPPORT (OUTPATIENT)
Dept: REHABILITATION | Facility: HOSPITAL | Age: 55
End: 2020-06-26
Payer: COMMERCIAL

## 2020-06-26 DIAGNOSIS — R29.898 WEAKNESS OF RIGHT HAND: ICD-10-CM

## 2020-06-26 DIAGNOSIS — M25.60 STIFFNESS DUE TO IMMOBILITY: ICD-10-CM

## 2020-06-26 DIAGNOSIS — Z74.09 STIFFNESS DUE TO IMMOBILITY: ICD-10-CM

## 2020-06-26 DIAGNOSIS — Z98.890 POST-OPERATIVE STATE: ICD-10-CM

## 2020-06-26 PROCEDURE — 97110 THERAPEUTIC EXERCISES: CPT | Mod: PO

## 2020-06-26 PROCEDURE — 97165 OT EVAL LOW COMPLEX 30 MIN: CPT | Mod: PO

## 2020-06-26 NOTE — PATIENT INSTRUCTIONS
Perform 10 repetitions 3 times/day        AROM: Forearm Pronation / Supination        With arm in handshake position, slowly rotate palm down until stretch is felt. Relax. Then rotate palm up until stretch is felt.  Repeat 10 times per set. Do 1-3 sets per session. Do 3 sessions per day.      Tendon Glides         Start at position A and move through each position slowly attempting to achieve full glide.  A-E is ONE repetition.     Complete 10 reps 3 times per day.     Extension (Passive)        Place thick telephone book on table and rest upper arm on it. Grasp forearm with other hand and use a steady downward and outward pull to straighten elbow. Hold 10 seconds.  Repeat 10 times. Do 3 sessions per day.  Activity: Swing object, such as briefcase, to straighten elbow.*    Flexion (Active)        Palm up, rest elbow on other hand. Bend as far as possible.   Repeat 10 times. Perform 1-3 sets per session. Do 3 sessions per day.

## 2020-06-26 NOTE — PLAN OF CARE
"  [facility] Occupational Therapy  Initial Evaluation     Date: 6/26/2020  Patient: Vicky Alvarado  Chart Number: [id number]  Referring Physician: Echo Aguirre PA-C  Therapy Diagnosis:   1. Post-operative state  Ambulatory referral/consult to Physical/Occupational Therapy   2. Stiffness due to immobility     3. Weakness of right hand         Medical Diagnosis: Z98.890 (ICD-10-CM) - Post-operative state  Physician Orders: Eval and treat  Evaluation Date: 6/26/2020  Plan of Care Certification Date: 6/26/2020 to 7/31/2020  Authorization Period: 6/22/2020 to 12/31/2020  Surgery Date and Procedure: 6/10/2020  Date of Return to MD: 7/21/2020    Visit #: 1 of 20  Time In: 1502  Time Out: 1550  Total Billable Time: 48 minutes     Precautions: Standard , Standard    Subjective     History of Current Condition: Virginie Alvarado is a 55 year old female who presents to [facility] for evaluation and treatment secondary to carpal tunnel release and right ulnar nerve decompression performed on 6/10/2020. Deficits include decreased  and pinch strength and stiffness of R UE limiting performance with ADLs, IADLs, and work tasks. Pt would benefit from skilled OT services for HEP guidance, pain management strategies, scar management, assistance to meet STG/LTG, and to maximize independence with meaningful occupations.     Involved Side: Right  Dominant Side: Right  Date of Onset: Symptoms began years ago  Previous Therapy: None    Patient's Goals for Therapy: Use right arm functionally and for work, hold things without dropping, "get back to normal"    Pain:  Functional Pain Scale Rating 0-10:   0/10 on average  0/10 at best  9/10 at worst (with activity); better since surgery  Location: Elbow worse than wrist  Description: aching, burning in elbow   Aggravating Factors: picking up things  Easing Factors: rest    Previous Level of function: Independent "     Current Level of Function: Independent; with restrictions secondary to surgery recovery and pain with activity    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    Vicky Alvarado  has a past surgical history that includes Treatment of cardiac arrhythmia (N/A, 1/29/2020); Foot surgery; and Carpal tunnel release (Right, 6/10/2020).    Vicky has a current medication list which includes the following prescription(s): apixaban, b complex vitamins, duloxetine, wjidlidp-wrix-yfn8-c-radha-bosw, hydrochlorothiazide, hydrocodone-acetaminophen, lisinopril, melatonin, metoprolol succinate, multivitamin, omega-3 fatty acids-vitamin e, omeprazole, and trazodone, and the following Facility-Administered Medications: sodium chloride 0.9%.    Review of patient's allergies indicates:  No Known Allergies       Objective     Mental status: alert; intact    Observation:   Pt is moving R UE WFL and trying to use it during daily functional tasks within reason. Scars are healing nicely. Pt benefits from some encouragement to move R wrist and during  and pinch assessment.       Sensation: Vicky reports sensation is normal since surgery and she is not having any trouble.       Wound Assessment:   Incisions are healing nicely; scarring in normal. Pt educated to apply petroleum (neosporin) on scar 3-4x/day.   Location: R elbow and wrist     Edema: Circumferential measurements: In centimters  Pt reports left wrist and hand is always more swollen than right secondary to [place]'s.      Right Left   MPs 18 cm 19.2 cm   PPC (Proximal Go Crease) 19.5 cm 20.2 cm   PWC (Proximal Wrist Crease) 15.6 cm 18 cm      Right Left   3 Inches distal to elbow  crease 26 cm 25.5 cm   Elbow crease 26.7 cm 25.5 cm   3 Inches proximal to elbow crease 32 cm 31 cm       Range of Motion:   Right     Elbow AROM   R EE R EF   WNL WNL     Right Wrist AROM  WE  WF  UD  RD    60 62 29 21       Forearm AROM  PRO  SUP    WNL (90) WNL (90)      Finger AROM - Full fist     Thumb AROM  MP  IP  RA  PA  OPP    WNL WNL WNL WNL .5cm        Strength: (BASHIR Dynamometer in psi.)     6/26/2020 6/26/2020    Left Right   Rung II 37 24       Pinch Strength (Measured in psi)     6/26/2020 6/26/2020    Left Right   Key Pinch 13 11   3pt Pinch 9 8   2pt Pinch 6 8         Treatment     Treatment Time In: 1536  Treatment Time Out: 1550  Total Treatment time separate from Evaluation time: 14    Vicky performed therapeutic exercises for 14 minutes including:  With R UE  - Median nerve glides  - Forearm pronation/supination  - Prayer stretch   - Tendon glides  - Elbow flexion/extension; pt interlocking hands to to apply downward pull, with forearm resting on table, to increase elbow extension      Home Exercise Program/Education:  Issued HEP (see patient instructions in EMR) and educated on application of petroleum for scar management. Exercises were reviewed and Vicky was able to demonstrate them prior to the end of the session.   Pt received a written copy of exercises to perform at home. Vicky demonstrated good  understanding of the education provided.  Pt was advised to perform these exercises free of pain, and to stop performing them if pain occurs.    Patient/Family Education: role of OT, goals for OT, scheduling/cancellations - pt verbalized understanding. Discussed insurance limitations with patient.      Assessment     Vicky Alvarado is a 55 y.o. female presents with limitations as described in problem list. Patient can benefit from Occupational Therapy services for moist heat, therapeutic exercises, home exercise program provied with written instructions, ice and strengthening and  orthotics, if deemed necessary. The following goals were discussed with the patient and she is in agreement with them as to be addressed in the treatment plan.    The patient's rehab potential is Excellent.     Anticipated barriers to occupational therapy: None noted  Pt has no cultural, educational or language barriers to learning provided.    Profile and History Assessment of Occupational Performance Level of Clinical Decision Making Complexity Score   Occupational Profile:   Vicky Alvarado is a 55 y.o. female who is currently employed Vicky Alvarado has difficulty with  ADLs and IADLs as listed previously, which  affecting his/her daily functional abilities.      Comorbidities:    has a past medical history of Atrial fibrillation, Avascular necrosis, Depression, psychiatric care, Hypertension, and Psychiatric problem.    Medical and Therapy History Review:   Brief               Performance Deficits    Physical:  Skin Integrity/Scar Formation  Edema   Strength  Pinch Strength  Pain    Cognitive:  No Deficits    Psychosocial:    No Deficits     Clinical Decision Making:  low    Assessment Process:  Problem-Focused Assessments    Modification/Need for Assistance:  Not Necessary    Intervention Selection:  Multiple Treatment Options       low  Based on PMHX, co morbidities , data from assessments and functional level of assistance required with task and clinical presentation directly impacting function.         Goals:    LTG's (5 weeks):  1)   Increase  strength 35 lbs. to grasp coffee pot and open heavy doors.   2)   Increase key pinch 15 psis for improvement with work tasks (manipulating surgical equipment).   3)   Decrease complaints of pain to  1 out of 10 at worst to increase functional hand use for ADL/work/leisure activities.  4)   Pt will return to near to prior level of function for ADLs and household management reporting I or Mod I with ADLs (dressing, feeding, grooming, toileting).      STG's (3 weeks)  1)   Patient to be IND with HEP and modalities for pain/edema managment.  2)   Full thumb opposition on R hand to increase fine motor manipulation skills.   3)   Increase  strength 30 lbs. to improve functional grasp for ADLs/work/leisure activities.   4)   Increase key pinch 13 psi's to increase independence with FM Coordination.   5)   Decrease complaints of pain to 4 out of 10 at worst to increase functional hand use for ADL/work/leisure activities.          Plan     Pt to be treated by Occupational Therapy 1 times per week for 5 weeks during the certification period from 6/26/2020 to 7/31/2020 to achieve the established goals.     Treatment to include: Paraffin, Fluidotherapy, Manual therapy/joint mobilizations, Modalities for pain management, Therapeutic exercises/activities., Strengthening, Edema Control, Scar Management and Energy Conservation, as well as any other treatments deemed necessary based on the patient's needs or progress.     Edith Nunez, OT

## 2020-07-07 ENCOUNTER — CLINICAL SUPPORT (OUTPATIENT)
Dept: REHABILITATION | Facility: HOSPITAL | Age: 55
End: 2020-07-07
Payer: COMMERCIAL

## 2020-07-07 ENCOUNTER — OFFICE VISIT (OUTPATIENT)
Dept: ELECTROPHYSIOLOGY | Facility: CLINIC | Age: 55
End: 2020-07-07
Payer: COMMERCIAL

## 2020-07-07 ENCOUNTER — HOSPITAL ENCOUNTER (OUTPATIENT)
Dept: CARDIOLOGY | Facility: CLINIC | Age: 55
Discharge: HOME | End: 2020-07-07
Payer: COMMERCIAL

## 2020-07-07 VITALS
WEIGHT: 293 LBS | HEART RATE: 58 BPM | HEIGHT: 65 IN | DIASTOLIC BLOOD PRESSURE: 80 MMHG | SYSTOLIC BLOOD PRESSURE: 110 MMHG | BODY MASS INDEX: 48.82 KG/M2

## 2020-07-07 DIAGNOSIS — M25.60 STIFFNESS DUE TO IMMOBILITY: ICD-10-CM

## 2020-07-07 DIAGNOSIS — I48.0 PAF (PAROXYSMAL ATRIAL FIBRILLATION): Primary | ICD-10-CM

## 2020-07-07 DIAGNOSIS — I48.91 ATRIAL FIBRILLATION, UNSPECIFIED TYPE: Primary | ICD-10-CM

## 2020-07-07 DIAGNOSIS — R29.898 WEAKNESS OF RIGHT HAND: ICD-10-CM

## 2020-07-07 DIAGNOSIS — I48.91 ATRIAL FIBRILLATION, UNSPECIFIED TYPE: ICD-10-CM

## 2020-07-07 DIAGNOSIS — Z74.09 STIFFNESS DUE TO IMMOBILITY: ICD-10-CM

## 2020-07-07 DIAGNOSIS — I42.9 CARDIOMYOPATHY, UNSPECIFIED TYPE: ICD-10-CM

## 2020-07-07 DIAGNOSIS — Z01.818 PRE-OP TESTING: ICD-10-CM

## 2020-07-07 PROCEDURE — 97530 THERAPEUTIC ACTIVITIES: CPT | Mod: PO

## 2020-07-07 PROCEDURE — 3074F SYST BP LT 130 MM HG: CPT | Mod: CPTII,S$GLB,, | Performed by: INTERNAL MEDICINE

## 2020-07-07 PROCEDURE — 99215 PR OFFICE/OUTPT VISIT, EST, LEVL V, 40-54 MIN: ICD-10-PCS | Mod: S$GLB,,, | Performed by: INTERNAL MEDICINE

## 2020-07-07 PROCEDURE — 3079F PR MOST RECENT DIASTOLIC BLOOD PRESSURE 80-89 MM HG: ICD-10-PCS | Mod: CPTII,S$GLB,, | Performed by: INTERNAL MEDICINE

## 2020-07-07 PROCEDURE — 3008F BODY MASS INDEX DOCD: CPT | Mod: CPTII,S$GLB,, | Performed by: INTERNAL MEDICINE

## 2020-07-07 PROCEDURE — 3074F PR MOST RECENT SYSTOLIC BLOOD PRESSURE < 130 MM HG: ICD-10-PCS | Mod: CPTII,S$GLB,, | Performed by: INTERNAL MEDICINE

## 2020-07-07 PROCEDURE — 93005 ELECTROCARDIOGRAM TRACING: CPT | Mod: S$GLB,,, | Performed by: INTERNAL MEDICINE

## 2020-07-07 PROCEDURE — 3008F PR BODY MASS INDEX (BMI) DOCUMENTED: ICD-10-PCS | Mod: CPTII,S$GLB,, | Performed by: INTERNAL MEDICINE

## 2020-07-07 PROCEDURE — 99215 OFFICE O/P EST HI 40 MIN: CPT | Mod: S$GLB,,, | Performed by: INTERNAL MEDICINE

## 2020-07-07 PROCEDURE — 93005 RHYTHM STRIP: ICD-10-PCS | Mod: S$GLB,,, | Performed by: INTERNAL MEDICINE

## 2020-07-07 PROCEDURE — 3079F DIAST BP 80-89 MM HG: CPT | Mod: CPTII,S$GLB,, | Performed by: INTERNAL MEDICINE

## 2020-07-07 PROCEDURE — 93010 ELECTROCARDIOGRAM REPORT: CPT | Mod: S$GLB,,, | Performed by: INTERNAL MEDICINE

## 2020-07-07 PROCEDURE — 97110 THERAPEUTIC EXERCISES: CPT | Mod: PO

## 2020-07-07 PROCEDURE — 99999 PR PBB SHADOW E&M-EST. PATIENT-LVL IV: ICD-10-PCS | Mod: PBBFAC,,, | Performed by: INTERNAL MEDICINE

## 2020-07-07 PROCEDURE — 93010 RHYTHM STRIP: ICD-10-PCS | Mod: S$GLB,,, | Performed by: INTERNAL MEDICINE

## 2020-07-07 PROCEDURE — 99999 PR PBB SHADOW E&M-EST. PATIENT-LVL IV: CPT | Mod: PBBFAC,,, | Performed by: INTERNAL MEDICINE

## 2020-07-07 NOTE — PROGRESS NOTES
"  Occupational Therapy Treatment Note     Date: 7/7/2020  Name: Vicky Alvarado  Clinic Number: [id number]    Therapy Diagnosis:   Encounter Diagnoses   Name Primary?    Stiffness due to immobility     Weakness of right hand      Physician: Echo Aguirre PA-C    Physician Orders: Eval and treat  Medical Diagnosis: Z98.890 (ICD-10-CM) - Post-operative state  Surgical Procedure and Date: 6/10/2020  Evaluation Date: 6/26/2020  Insurance Authorization Period Expiration: 12/31/2020  Plan of Care Certification Period: 6/26/2020 to 7/31/2020  Date of Return to MD: 7/21/2020    Visit # / Visits authorized: 2 / 20  Time In:1430  Time Out: 1515  Total Billable Time: 45 minutes    Precautions:  Standard      Subjective     Pt reports:" I'm doing good. I still wear the brace, but I'm doing everything with my right hand."  she was compliant with home exercise program given last session.   Response to previous treatment:Pt reports she can extend her elbow more easily   Functional change: Pt reports she hasn't been dropping things as much    Involved Side: Right  Dominant Side: Right    Patient's Goals for Therapy: Use right arm functionally and for work, hold things without dropping, "get back to normal"    Pain: 0/10 currently   Functional Pain Scale Rating 0-10:   0/10 on average  0/10 at best  9/10 at worst (with activity); better since surgery  Location: Elbow worse than wrist  Description: aching, burning in elbow   Aggravating Factors: picking up things  Easing Factors: rest    Objective     Vicky received therapeutic exercises for 20 minutes including:  Review HEP including:   - Prayer stretch x5 reps with 3 second holds  - Tendon glides with right hand x10 reps   - Median nerve glides with right hand x10 reps    Pt administered yellow theraputty this date and performed the following exercises with right hand x10 reps each:   - Putty squeezes   - Putty kneads with finger and wrist extension   - Digit extension with " putty in loop  - Tip pinch  - Lateral pinch    Green digiflex, full  using right hand 2x10    Vicky participated in dynamic functional therapeutic activities to improve functional performance for 25  minutes, including:  - Using green resistive clothespin, picked up and released small poms incorporating digit opposition     In hand manipulation tasks including:   - Picking up and placing 10 small buttons using palm to finger translation. Pt required cues to keep forearm in neutral to increase challenge. Pt completed activity x2 trials.  - Picked up 5 small beads at a time and placed them in yellow theraputty using palm to finger translation. Pt then used right hand to remove all 10 beads from putty to improve hand strength. Pt with ~3 drops while placing beads.      Home Exercises and Education Provided     Education provided:   - Continue HEP provided at initial evaluation including median nerve glides, forearm pronation/supination, prayer stretch, tendon glides, elbow flexion/extension (please see clinic reference for details)   - Putty exercises with yellow theraputty (please see clinic references for details)   - Progress towards goals     Written Home Exercises Provided: yes.  Exercises were reviewed and Vicky was able to demonstrate them prior to the end of the session.  Vicky demonstrated good  understanding of the HEP provided.   .   See EMR under Patient Instructions for exercises provided 7/7/2020.        Assessment     Vicky Alvarado is a 55 year old female that presents with pain in her right hand and decreased UE strength limiting functional performance with meaningful occupations. Pt with increased difficulty with in hand manipulation activities, requiring increased time. Pt would continue to benefit from skilled OT services to increase hand strength, fine motor coordination, HEP guidance, and to meet LTG.     Vicky is progressing well towards her goals and there are no updates to goals  at this time. Pt prognosis is Excellent.     Pt will continue to benefit from skilled outpatient occupational therapy to address the deficits listed in the problem list on initial evaluation provide pt/family education and to maximize pt's level of independence in the home and community environment.     Anticipated barriers to occupational therapy: N/A    Pt's spiritual, cultural and educational needs considered and pt agreeable to plan of care and goals.    Goals:  STG's (3 weeks)  1)   Patient to be IND with HEP and modalities for pain/edema managment. ongoing  2)   Full thumb opposition on R hand to increase fine motor manipulation skills. ongoing   3)   Increase  strength 30 lbs. to improve functional grasp for ADLs/work/leisure activities.  ongoing  4)   Increase key pinch 13 psi's to increase independence with FM Coordination. ongoing  5)   Decrease complaints of pain to 4 out of 10 at worst to increase functional hand use for ADL/work/leisure activities. ongoing    LTG's (5 weeks):  1)   Increase  strength 35 lbs. to grasp coffee pot and open heavy doors. 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 ongoing  3)   Decrease complaints of pain to  1 out of 10 at worst to increase functional hand use for ADL/work/leisure activities. ongoing  4)   Pt will return to near to prior level of function for ADLs and household management reporting I or Mod I with ADLs (dressing, feeding, grooming, toileting). ongoing  Plan   Pt to be treated by Occupational Therapy 1 times per week for 5 weeks during the certification period from 6/26/2020 to 7/31/2020 to achieve the established goals.      Treatment to include: Paraffin, Fluidotherapy, Manual therapy/joint mobilizations, Modalities for pain management, Therapeutic exercises/activities., Strengthening, Edema Control, Scar Management and Energy Conservation, as well as any other treatments deemed  necessary based on the patient's needs or progress.     Updates/Grading for next session: hand strengthening, FM coordination, review CATARINA Nunez, OT

## 2020-07-07 NOTE — PROGRESS NOTES
Subjective:    Patient ID:  Vicky Alvarado is a 55 y.o. female who presents for evaluation of Atrial Fibrillation      55 yoF here for AF management. 1/9/20, she had her upper teeth pulled for dentures and been on penicillin since that time. She was in SR. She takes atenolol 10 mg q.d. for hypertension. She underwent REENA/CV 1/29/2020. EF 40% in AF. PET Stress 3/2020 revealed no ischemia. Repeat echo in NSR showed EF 60%. She has had recurrence, once during carpal tunnel surgery, and several other times since.  Regarding her family history, her mother and brother both have atrial fibrillation. She is on elquis for CVA prophylaxis. CHADSVASC of at least 3. No bleeding issues with xarelto.      TTE 5/2020:  · Normal left ventricular systolic function. The estimated ejection fraction is 60%.  · Severe left atrial enlargement.  · Grade I (mild) left ventricular diastolic dysfunction consistent with impaired relaxation.  · Normal right ventricular systolic function.  · Mild right atrial enlargement.  · Mild aortic valve stenosis. Aortic valve area is 1.83 cm2; peak velocity is 2.42 m/s; mean gradient is 12 mmHg.  · Mild tricuspid regurgitation.  · Normal central venous pressure (3 mmHg).  · The estimated PA systolic pressure is 22 mmHg.     PET Stress 3/2020:  The relative PET images are normal showing no clinically significant regional resting or stress induced perfusion defects.    Whole heart absolute myocardial perfusion (cc/min/g) averaged 0.47 cc/min/g at rest (which is reduced), 0.93 cc/min/g at stress (which is moderately reduced), and 2.03 CFR (which is mildly reduced).    Gated perfusion images showed an ejection fraction of 78% at rest and 72% during stress. Normal ejection fraction is greater than 51%.    Wall motion was normal at rest and during stress.    LV cavity size is normal at rest and stress.    The EKG portion of this study is negative for ischemia.    There were no arrhythmias during  stress.    The patient reported no chest pain during the stress test.    There are no prior studies for comparison.        TTE (January 2020):   · Atrial fibrillation observed.  · Left ventricular systolic function. The estimated ejection fraction is 40%.  · Local segmental wall motion abnormalities.  · Moderate left atrial enlargement.  · Normal right ventricular systolic function.  · Moderate right atrial enlargement.  · Possibly mild aortic valve stenosis ( reduced LENCHO but valve opening appears near normal).  · Aortic valve area is 1.67 cm2; peak velocity is 1.77 m/s; mean gradient is 8 mmHg.  · Mild mitral regurgitation.  · Mild tricuspid regurgitation.  · Normal central venous pressure (3 mmHg).  · The estimated PA systolic pressure is 29 mmHg.    Past Medical History:  No date: Atrial fibrillation      Comment:  cardioversion  No date: Avascular necrosis      Comment:  L hand  No date: Depression  No date: Hx of psychiatric care  No date: Hypertension  No date: Psychiatric problem    Past Surgical History:  6/10/2020: CARPAL TUNNEL RELEASE; Right      Comment:  Procedure: RELEASE, CARPAL TUNNEL - RIGHT;  Surgeon:                Adelaida Hall MD;  Location: [facility] OR;  Service:                Orthopedics;  Laterality: Right;  GENERAL AND REGIONAL  No date: FOOT SURGERY  1/29/2020: TREATMENT OF CARDIAC ARRHYTHMIA; N/A      Comment:  Procedure: CARDIOVERSION;  Surgeon: Gabriel Hawley MD;  Location: [facility] EP LAB;  Service: Cardiology;                 Laterality: N/A;  af, demi, dccv, anes, mb, 345    Social History    Socioeconomic History      Marital status: Single      Spouse name: Not on file      Number of children: Not on file      Years of education: Not on file      Highest education level: Not on file    Occupational History      Not on file    Social Needs      Financial resource strain: Not on file      Food insecurity        Worry: Not on file        Inability: Not on file       Transportation needs        Medical: Not on file        Non-medical: Not on file    Tobacco Use      Smoking status: Former Smoker        Types: Cigarettes        Quit date: 2019        Years since quittin.0      Smokeless tobacco: Never Used    Substance and Sexual Activity      Alcohol use: No      Drug use: No      Sexual activity: Not on file    Lifestyle      Physical activity        Days per week: Not on file        Minutes per session: Not on file      Stress: Not on file    Relationships      Social connections        Talks on phone: Not on file        Gets together: Not on file        Attends [employer] service: Not on file        Active member of club or organization: Not on file        Attends meetings of clubs or organizations: Not on file        Relationship status: Not on file    Other Topics      Concerns:        Patient feels they ought to cut down on drinking/drug use: Not Asked        Patient annoyed by others criticizing their drinking/drug use: Not Asked        Patient has felt bad or guilty about drinking/drug use: Not Asked        Patient has had a drink/used drugs as an eye opener in the AM: Not Asked    Social History Narrative      Not on file      Review of patient's family history indicates:  Problem: Cataracts      Relation: Mother          Age of Onset: (Not Specified)  Problem: Hypertension      Relation: Mother          Age of Onset: (Not Specified)  Problem: Thyroid disease      Relation: Mother          Age of Onset: (Not Specified)  Problem: Diabetes      Relation: Father          Age of Onset: (Not Specified)  Problem: Hypertension      Relation: Father          Age of Onset: (Not Specified)  Problem: Hypertension      Relation: Sister          Age of Onset: (Not Specified)  Problem: Hypertension      Relation: Brother          Age of Onset: (Not Specified)  Problem: Amblyopia      Relation: Neg Hx          Age of Onset: (Not Specified)  Problem: Blindness      Relation:  Neg Hx          Age of Onset: (Not Specified)  Problem: Cancer      Relation: Neg Hx          Age of Onset: (Not Specified)  Problem: Glaucoma      Relation: Neg Hx          Age of Onset: (Not Specified)  Problem: Macular degeneration      Relation: Neg Hx          Age of Onset: (Not Specified)  Problem: Retinal detachment      Relation: Neg Hx          Age of Onset: (Not Specified)  Problem: Strabismus      Relation: Neg Hx          Age of Onset: (Not Specified)  Problem: Stroke      Relation: Neg Hx          Age of Onset: (Not Specified)        Review of Systems   Constitution: Negative.   HENT: Negative.    Eyes: Negative.    Cardiovascular: Positive for dyspnea on exertion, irregular heartbeat and palpitations. Negative for chest pain, leg swelling, near-syncope and syncope.   Respiratory: Negative.  Negative for shortness of breath.    Endocrine: Negative.    Hematologic/Lymphatic: Negative.    Skin: Negative.    Musculoskeletal: Negative.    Gastrointestinal: Negative.    Genitourinary: Negative.    Neurological: Negative.  Negative for dizziness and light-headedness.   Psychiatric/Behavioral: Negative.    Allergic/Immunologic: Negative.         Objective:    Physical Exam   Constitutional: She is oriented to person, place, and time. She appears well-developed and well-nourished. No distress.   HENT:   Head: Normocephalic and atraumatic.   Eyes: Pupils are equal, round, and reactive to light. EOM are normal.   Neck: Normal range of motion. No JVD present. No thyromegaly present.   Cardiovascular: Normal rate, regular rhythm, S1 normal, S2 normal and normal heart sounds. PMI is not displaced. Exam reveals no gallop and no friction rub.   No murmur heard.  Pulmonary/Chest: Effort normal and breath sounds normal. No respiratory distress. She has no wheezes. She has no rales.   Abdominal: Soft. Bowel sounds are normal. She exhibits no distension. There is no abdominal tenderness. There is no rebound and no  guarding.   Musculoskeletal: Normal range of motion.         General: No tenderness or edema.   Neurological: She is alert and oriented to person, place, and time. No cranial nerve deficit.   Skin: Skin is warm and dry. No rash noted. No erythema.   Psychiatric: She has a normal mood and affect. Her behavior is normal. Judgment and thought content normal.   Vitals reviewed.    ECG: NSR nl MO, QRS, QTc        Assessment:       1. Atrial Fibrillation (paroxysmal)    2. Cardiomyopathy, unspecified type         Plan:       57 yoF symptomatic pAF with arrhythmia induced CM. I discussed AF and its basic pathophysiology, including its health implications and treatment options with the patient. Specifically, I addressed the need for CVA prophylaxis as well as the goal to reduce symptomatic arrhythmic episodes by pharmacologic and/or procedural methods. She has demonstrated arrhythmia induced CM. I recommended AAD vs PVI for definitive therapy. I had extensive discussion with patient regarding risks and benefits of PVI/WACA, and the patient would like to proceed. Risks of procedure include (but are not limited to) bleeding, stroke, perforation requiring emergency draining or surgery, AV block, death, AV fistula, AE fistula, PV stenosis.    Last anti-coagulation night prior to procedure.    RF PVI  Anesthesia  REENA prior, cancel if NSR  SHADY

## 2020-07-07 NOTE — PATIENT INSTRUCTIONS
Perform all putty exercises with right hand x10 reps once a day.      Strengthening (Resistive Putty)        Squeeze putty using thumb and all fingers.    Copyright © I. All rights reserved.     Flexion (Resistive Putty)      Extension (Assistive Putty)        Roll putty back and forth, being sure to use all fingertips. Include wrist extension.     Copyright © I. All rights reserved.     Finger / Thumb Extensors        Place right fingers and thumb in center of putty donut, stretch out.    Copyright © I. All rights reserved.     Palmar Pinch Strengthening (Resistive Putty)        Pinch putty between thumb and each fingertip in turn.    Copyright © I. All rights reserved.     Lateral Pinch Strengthening (Resistive Putty)        Squeeze between thumb and side of each finger in turn.    Copyright © [employer]. All rights reserved.

## 2020-07-11 ENCOUNTER — HOSPITAL ENCOUNTER (OUTPATIENT)
Dept: SLEEP MEDICINE | Facility: OTHER | Age: 55
Discharge: HOME | End: 2020-07-11
Attending: PSYCHIATRY & NEUROLOGY
Payer: COMMERCIAL

## 2020-07-11 DIAGNOSIS — G47.30 SLEEP APNEA, UNSPECIFIED TYPE: ICD-10-CM

## 2020-07-11 DIAGNOSIS — G47.33 OSA (OBSTRUCTIVE SLEEP APNEA): ICD-10-CM

## 2020-07-11 PROCEDURE — 95811 POLYSOM 6/>YRS CPAP 4/> PARM: CPT | Mod: 26,,, | Performed by: PSYCHIATRY & NEUROLOGY

## 2020-07-11 PROCEDURE — 95810 POLYSOM 6/> YRS 4/> PARAM: CPT

## 2020-07-11 PROCEDURE — 95811 PR POLYSOMNOGRAPHY W/CPAP: ICD-10-PCS | Mod: 26,,, | Performed by: PSYCHIATRY & NEUROLOGY

## 2020-07-13 ENCOUNTER — OFFICE VISIT (OUTPATIENT)
Dept: PSYCHIATRY | Facility: CLINIC | Age: 55
End: 2020-07-13
Payer: COMMERCIAL

## 2020-07-13 DIAGNOSIS — F11.21 OPIOID USE DISORDER, MODERATE, IN SUSTAINED REMISSION, DEPENDENCE: ICD-10-CM

## 2020-07-13 DIAGNOSIS — F32.A DEPRESSIVE DISORDER: Primary | ICD-10-CM

## 2020-07-13 PROCEDURE — 99214 OFFICE O/P EST MOD 30 MIN: CPT | Mod: 95,,, | Performed by: PSYCHIATRY & NEUROLOGY

## 2020-07-13 PROCEDURE — 99214 PR OFFICE/OUTPT VISIT, EST, LEVL IV, 30-39 MIN: ICD-10-PCS | Mod: 95,,, | Performed by: PSYCHIATRY & NEUROLOGY

## 2020-07-13 RX ORDER — TRAZODONE HYDROCHLORIDE 100 MG/1
100 TABLET ORAL NIGHTLY PRN
Qty: 90 TABLET | Refills: 3 | Status: SHIPPED | OUTPATIENT
Start: 2020-07-13 | End: 2021-07-12 | Stop reason: SDUPTHER

## 2020-07-13 RX ORDER — METOPROLOL SUCCINATE 50 MG/1
50 TABLET, EXTENDED RELEASE ORAL 2 TIMES DAILY
Qty: 90 TABLET | Refills: 6 | Status: SHIPPED | OUTPATIENT
Start: 2020-07-13 | End: 2021-06-05 | Stop reason: SDUPTHER

## 2020-07-13 RX ORDER — DULOXETIN HYDROCHLORIDE 60 MG/1
120 CAPSULE, DELAYED RELEASE ORAL DAILY
Qty: 180 CAPSULE | Refills: 3 | Status: SHIPPED | OUTPATIENT
Start: 2020-07-13 | End: 2021-07-12 | Stop reason: SDUPTHER

## 2020-07-13 NOTE — PROGRESS NOTES
Outpatient Psychiatry Follow-Up Visit (MD/NP)    7/13/2020    Clinical Status of Patient:  Outpatient (Ambulatory)    Chief Complaint:  Vicky Alvarado is a 55 y.o. OIBW   female ( dark neck length hair)  who presents today for follow-up of depression and substance problems.  Met with patient.   PCP - Dr Cordero  [facility]       The patient location is: home in [place].   The chief complaint leading to consultation is: depression     Visit type: audiovisual    Face to Face time with patient: 30 mins   35  minutes of total time spent on the encounter, which includes face to face time and non-face to face time preparing to see the patient (eg, review of tests), Obtaining and/or reviewing separately obtained history, Documenting clinical information in the electronic or other health record, Independently interpreting results (not separately reported) and communicating results to the patient/family/caregiver, or Care coordination (not separately reported).       Each patient to whom he or she provides medical services by telemedicine is:  (1) informed of the relationship between the physician and patient and the respective role of any other health care provider with respect to management of the patient; and (2) notified that he or she may decline to receive medical services by telemedicine and may withdraw from such care at any time.    Notes:   Interval History and Content of Current Session:  Interim Events/Subjective Report/Content of Current Session: Pt is s/p ABU in June - July 2015  . Sobriety is intact . Fa attends AA with pt as he  was  a chronic drinker . He later stopped for 10 yrs then dabbled some when available . Pt has  past hx of opiates, soma and Xanax but relapsed on opiates 3 yrs ago prior to rehab . RNP  Graduated in  3/2019 .       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  Enjoys her job .  Has regained ICU privileges , OT,  18 tests /yr  and now  narcs  . Did employee covid line as could not go to ER assignment .       Alcohol was never a sig concern .    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    Very proud of her continued  weight loss but then gained weight .  Declined gastric  bypass ; family members have had procedures w limited success .  [place]ives in old [place] [address]      74 aunt with stage 4 breast CA under care  doing well  With good result of less mets . Took  close family  to [facility]  And [facility] disclosed opiate dependence and went to [place] in 2019. He had atrial fib in Jan 2020. Put on anticoags.  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        In jan 2020-- Total upper dentures , lots of pain , is controlled opiates and spoke to  sponsor and coworkers     Jan 2020 new onset a fib with heart failure and cardio version . She has no clogged aa. She is solely electrical source and due to have ablation next week.     Dad is aging poorly.     R wrist  Carpel nerve and ulnar nerve released .Good outcome.  Same opiate precautions with sis doling out minimal meds for the day  .  She resented sis 2nd time but understood with insight.     Understandably stressed with covid :    Recent sleep apnea test pending .     Sis Naima called last night to say bro found unconscious in tub.  [place] [facility] bagged I him and he was in a fib. Pt had to tell sis at scene to tell EMS to give narcan which revived him. He was sent to [facility] then released with narcan rx.     Psychiatric Review Of Systems - Is patient experiencing or having changes in:  sleep:  No , trouble with global insomnia resolved with trazodone ; likely sleep apnea   appetite: no, conscious  lifestyle change   weight: down  24#  , 283( 6/18)  weighs does not want gastric bypass / procedures ;  july 2020-- 285;    energy/anergy: no  interest/pleasure/anhedonia: no, she has adopted 2 cats , now grown ; enjoyed trip to  [place] ; her [facility] will reopen in Sept   somatic symptoms: no, Cymbalta helps chronic knee pain   libido: no  anxiety/panic: no  guilty/hopelessness: no  concentration: no  S.I.B.s/risky behavior: no  Irritability: no  Racing thoughts: no  Impulsive behaviors: no  Paranoia:no  AVH:no    Cymbalta 60 mg 2 qday  ( $ 50 in 2016 ) ; [id number]  mg qhs     Likes Lvian Funez on TV ministry     Psychotherapy:  · Target symptoms: depression, substance abuse  · Why chosen therapy is appropriate versus another modality: relevant to diagnosis, patient responds to this modality, evidence based practice  · Outcome monitoring methods: self-report, observation  · Therapeutic intervention type: supportive psychotherapy  · Topics discussed/themes: work stress, substance abuse  · The patient's response to the intervention is accepting. The patient's progress toward treatment goals is excellent.   · Duration of intervention: 15  minutes.    Review of Systems   · Prob Pert. 1 sys, Ext. psych +2 add., Comp. 10-14 sys  · PSYCHIATRIC: Pertinant items are noted in the narrative.  · CONSTITUTIONAL: desired weight loss   · MUSCULOSKELETAL:  tolerable knee pain and  stiffness   · NEUROLOGIC: No weakness, sensory changes, seizures, confusion, memory loss, tremor or other abnormal movements.  · ENDOCRINE: No polydipsia or polyuria.  · INTEGUMENTARY: No rashes or lacerations.  · EYES: No exophthalmos, jaundice or blindness.  · ENT: No dizziness, tinnitus or hearing loss.  · RESPIRATORY: No shortness of breath.  · CARDIOVASCULAR: No tachycardia or chest pain.  · GASTROINTESTINAL: No nausea, vomiting, pain, constipation or diarrhea.  · GENITOURINARY: No frequency, dysuria or sexual dysfunction.  · HEMATOLOGIC/LYMPHATIC: No excessive bleeding, prolonged or excessive bleeding after dental extraction/injury.  · ALLERGIC/IMMUNOLOGIC: No allergic response to materials, foods or animals at this time.    Past Medical, Family  and Social History: The patient's past medical, family and social history have been reviewed and updated as appropriate within the electronic medical record - see encounter notes.    Compliance: yes    Side effects: None    Risk Parameters:  Patient reports no suicidal ideation  Patient reports no homicidal ideation  Patient reports no self-injurious behavior  Patient reports no violent behavior    Exam (detailed: at least 9 elements; comprehensive: all 15 elements)   Constitutional  Vitals:  Most recent vital signs, dated less than 90 days prior to this appointment, were reviewed.   There were no vitals filed for this visit. vitals were not taken for this visit.          General:  unremarkable, age appropriate, casually dressed, overweight     Musculoskeletal  Muscle Strength/Tone:  no spasicity, no rigidity   Gait & Station:  non-ataxic     Psychiatric  Speech:  no latency; no press   Mood & Affect:  euthymic  congruent and appropriate   Thought Process:  normal and logical   Associations:  intact   Thought Content:  normal, no suicidality, no homicidality, delusions, or paranoia   Insight:  has awareness of illness   Judgement: behavior is adequate to circumstances   Orientation:  grossly intact   Memory: intact for content of interview   Language: grossly intact   Attention Span & Concentration:  able to focus   Fund of Knowledge:  intact and appropriate to age and level of education     Assessment and Diagnosis   Status/Progress: Based on the examination today, the patient's problem(s) is/are well controlled.  New problems have not been presented today.   Lack of compliance are not complicating management of the primary condition.  There are no active rule-out diagnoses for this patient at this time.     General Impression: lots of stressors       ICD-10-CM ICD-9-CM   1. Depressive disorder  F32.9 311   2. Opioid use disorder, moderate, in sustained remission, dependence  F11.21 304.03        Intervention/Counseling/Treatment Plan   · Continue and refilled Cymbalta 60 mg  2 q day  ;  trazodone  100 mg qhs      No seizure contraindication . Educated about risk of serotonin syndrome .        Restart  Exercising  ; see pcp for exercise clearance     Call [facility] for weight loss        Return to Clinic: 6 months

## 2020-07-14 ENCOUNTER — CLINICAL SUPPORT (OUTPATIENT)
Dept: REHABILITATION | Facility: HOSPITAL | Age: 55
End: 2020-07-14
Payer: COMMERCIAL

## 2020-07-14 DIAGNOSIS — R29.898 WEAKNESS OF RIGHT HAND: ICD-10-CM

## 2020-07-14 DIAGNOSIS — M25.60 STIFFNESS DUE TO IMMOBILITY: ICD-10-CM

## 2020-07-14 DIAGNOSIS — Z74.09 STIFFNESS DUE TO IMMOBILITY: ICD-10-CM

## 2020-07-14 PROCEDURE — 97530 THERAPEUTIC ACTIVITIES: CPT | Mod: PO

## 2020-07-14 PROCEDURE — 97110 THERAPEUTIC EXERCISES: CPT | Mod: PO

## 2020-07-14 NOTE — PROGRESS NOTES
"  Occupational Therapy Treatment Note/Discharge Summary     Date: 7/14/2020  Name: Vicky Alvarado  Clinic Number: [id number]    Therapy Diagnosis:   Encounter Diagnoses   Name Primary?    Stiffness due to immobility     Weakness of right hand      Physician: Echo Aguirre PA-C    Physician Orders: Eval and treat  Medical Diagnosis: Z98.890 (ICD-10-CM) - Post-operative state  Surgical Procedure and Date: 6/10/2020  Evaluation Date: 6/26/2020  Insurance Authorization Period Expiration: 12/31/2020  Plan of Care Certification Period: 6/26/2020 to 7/31/2020  Date of Return to MD: 7/21/2020    Visit # / Visits authorized: 3 / 20  Time In:1433  Time Out: 1515  Total Billable Time: 41 minutes    Precautions:  Standard      Subjective     Pt reports:" I've been trying to use my hand and practice my fine motor skills." Pt reports she is typing fine at work, feels like she is doing much better, and feels as if she can cont I at home. She also reports her R elbow is tender to the touch.   she was compliant with home exercise program given last session.   Response to previous treatment:Pt reports she can extend her elbow more easily   Functional change: Pt reports she hasn't been dropping things as much    Involved Side: Right  Dominant Side: Right    Patient's Goals for Therapy: Use right arm functionally and for work, hold things without dropping, "get back to normal"    Pain: 0/10 currently   Functional Pain Scale Rating 0-10:   0/10 on average  0/10 at best  9/10 at worst (with activity); better since surgery  Location: Elbow worse than wrist  Description: aching, burning in elbow   Aggravating Factors: picking up things  Easing Factors: rest    Objective     Vicky received therapeutic exercises for 33 minutes including:  Review HEP including:   - Prayer stretch x5 reps with 3 second holds  Pt administered red theraputty this date and performed the following exercises with right hand x10 reps each:   - Putty " squeezes   - Putty kneads with finger and wrist extension   - Digit extension with putty in loop  - Tip pinch  - Lateral pinch    Reassessment of UE ROM, strength, and edema:    Edema: Circumferential measurements: In centimters  Pt reports left wrist and hand is always more swollen than right secondary to [place]'s.        Right (eval) Left (eval) Right (7/14/2020   MPs 18 cm 19.2 cm 18 cm   PPC (Proximal Go Crease) 19.5 cm 20.2 cm 20 cm    PWC (Proximal Wrist Crease) 15.6 cm 18 cm 15.5 cm         Right (eval) Left (eval) Right (7/14/2020)   3 Inches distal to elbow crease 26 cm 25.5 cm 28.5 cm   Elbow crease 26.7 cm 25.5 cm 26 cm   3 Inches proximal to elbow crease 32 cm 31 cm 25.5 cm     Right Wrist AROM (eval)  WE    UD  RD    60 62 29 21     Right Wrist AROM (discharge)  WE    UD  RD    62 74 40 20     Thumb AROM  Opposition (eval): .5cm  Opposition (discharge): full     Strength: (BSAHIR Dynamometer in psi.)       eval 6/26/2020 eval 6/26/2020 discharge 7/14/2020 discharge 7/14/2020     Left Right Left Right   Rung II 37 24 48.6 46.3     Pinch Strength (Measured in psi)       eval 6/26/2020 eval 6/26/2020 discharge 7/14/2020 discharge 7/14/2020     Left Right Left Right   Key Pinch 13 11 12 10.5   3pt Pinch 9 8 11 10   2pt Pinch 6 8 11.5 10       Vicky participated in dynamic functional therapeutic activities to improve functional performance for 8 minutes, including:  In hand manipulation tasks including:   - Picking up and placing 10 small buttons using palm to finger translation; Pt completed activity x2 trials.     Pt educated on continuation of HEP at home I and discussed outcomes as related to initial evaluation.     Home Exercises and Education Provided     Education provided:   - Continue HEP provided at initial evaluation including median nerve glides, forearm pronation/supination, prayer stretch, tendon glides, elbow flexion/extension (please see clinic reference for details)   - Cont  putty exercises with red theraputty (please see clinic references for details)     Written Home Exercises Provided: yes.  Exercises were reviewed and Vicky was able to demonstrate them prior to the end of the session.  Vicky demonstrated good  understanding of the HEP provided.   .   See EMR under Patient Instructions for exercises provided 7/7/2020.        OCCUPATIONAL THERAPY ASSESSMENT & DISCHARGE SUMMARY     Total No-Shows: 0  Total Cancels: 0    Vicky Alvarado is a 55 year old female that presented to [facility] with pain in her right hand and decreased UE strength limiting functional performance with meaningful occupations. Pt has made excellent progress with her strength, functional performance at work and with home management tasks, and is independent with HEP. Pt is appropriate for discharge at this time.     Goals:  STG's (3 weeks)  1)   Patient to be IND with HEP and modalities for pain/edema managment. MET  2)   Full thumb opposition on R hand to increase fine motor manipulation skills. MET   3)   Increase  strength to 30 lbs. to improve functional grasp for ADLs/work/leisure activities. MET  4)   Increase key pinch to 13 psi's to increase independence with FM Coordination. NOT MET  5)   Decrease complaints of pain to 4 out of 10 at worst to increase functional hand use for ADL/work/leisure activities. MET    LTG's (5 weeks):  1)   Increase  strength 35 lbs. to grasp coffee pot and open heavy doors. 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 NOT MET  3)   Decrease complaints of pain to  1 out of 10 at worst to increase functional hand use for ADL/work/leisure activities. MET  4)   Pt will return to near to prior level of function for ADLs and household management reporting I or Mod I with ADLs (dressing, feeding, grooming, toileting). MET    Status Towards Goals Met: Pt has  met 4/5 STG and 3/4 LTG    Goals Not achieved and why: Pt cont to work towards increasing pinch strength; pt has red theraputty and is comfortable continuing pinch exercises, along with rest of HEP, at home independently.     Discharge reason : Met goals    PLAN   This patient is discharged from Outpatient Occupational Therapy Services.     Edith Nunez, OT  07/14/2020

## 2020-07-16 ENCOUNTER — TELEPHONE (OUTPATIENT)
Dept: SLEEP MEDICINE | Facility: CLINIC | Age: 55
End: 2020-07-16

## 2020-07-16 NOTE — PROCEDURES
[id number]         Sleep architecture: This is a baseline polysomnogram. At lights out, the patient fell asleep in 3.4 minutes and slept for 82.3% of the time. Total sleep time (TST) was 355.0 minutes. 10.1% of TST was in Stage N1 sleep, 28.0% TST in slow wave sleep, and 10.7% TST in REM sleep. The REM latency was 168.0 minutes. Sleep architecture was significantly disrupted due to underlying sleep apnea.   Respiratory: Mild to moderate snoring was present. The overall AHI was 15.0 with an oxygen will of 83.0%. The supine AHI was N/A and the REM AHI was 58.4. The patient did not qualify for a split night study due to an insufficient number of events in the first half of the study.   Motor movement / Parasomnia: There were no significant limb movements of sleep noted. The total limb movement index was 0.0 (0.0with arousal).   Cardiac: Cardiac rhythm monitoring revealed a sinus rhythm with occasional PAC's and PVC's.   Patient perception: On a post-sleep study questionnaire, the patient indicated that sleep was the same in the lab than compared to home.   IMPRESSION:   1. Obstructive Sleep Apnea (G47.33), moderate based on AHI criteria, REM predominant.     RECOMMENDATION:   1. CPAP titration study, if the patient is interested in this treatment modality.   2. Alternative treatment options (oral appliance, EPAP, ENT surgery) may be discussed with the patient.

## 2020-07-16 NOTE — TELEPHONE ENCOUNTER
Winside,      Please call the patient to inform re: recent sleep study was positive for moderate severity obstructive sleep apnea    I can order the AutoCPAP machine (see back in 7-8 weeks), or can schedule to discuss results.    PLEASE REFER THE PATIENT TO  [facility] to see the report and my explanations.      Please forward the message back to me if you could not reach the patient and could not leave a voicemail.    TY!

## 2020-07-17 ENCOUNTER — TELEPHONE (OUTPATIENT)
Dept: ELECTROPHYSIOLOGY | Facility: CLINIC | Age: 55
End: 2020-07-17

## 2020-07-17 ENCOUNTER — TELEPHONE (OUTPATIENT)
Dept: SLEEP MEDICINE | Facility: CLINIC | Age: 55
End: 2020-07-17

## 2020-07-17 ENCOUNTER — LAB VISIT (OUTPATIENT)
Dept: SURGERY | Facility: CLINIC | Age: 55
End: 2020-07-17
Payer: COMMERCIAL

## 2020-07-17 DIAGNOSIS — Z01.818 PRE-OP TESTING: ICD-10-CM

## 2020-07-17 DIAGNOSIS — G47.33 OBSTRUCTIVE SLEEP APNEA: Primary | ICD-10-CM

## 2020-07-17 PROCEDURE — U0003 INFECTIOUS AGENT DETECTION BY NUCLEIC ACID (DNA OR RNA); SEVERE ACUTE RESPIRATORY SYNDROME CORONAVIRUS 2 (SARS-COV-2) (CORONAVIRUS DISEASE [COVID-19]), AMPLIFIED PROBE TECHNIQUE, MAKING USE OF HIGH THROUGHPUT TECHNOLOGIES AS DESCRIBED BY CMS-2020-01-R: HCPCS

## 2020-07-17 NOTE — TELEPHONE ENCOUNTER
Attempted to contact patient to confirm procedure details for Monday. No answer. Left detailed message on voicemail with callback number.

## 2020-07-17 NOTE — TELEPHONE ENCOUNTER
"----- Message from Kimberly Bagley sent at 7/17/2020  8:48 AM CDT -----  Regarding: Returning the office's call  Name of Who is Calling:  Vicky Alvarado      What is the request in detail:   Patient called stating, "she's returning your call and would like you to please call again."     THANKS!      Reply by [facility]: no      Call Back: [phone] (W)                                      "

## 2020-07-18 LAB — SARS-COV-2 RNA RESP QL NAA+PROBE: NOT DETECTED

## 2020-07-20 ENCOUNTER — HOSPITAL ENCOUNTER (INPATIENT)
Facility: HOSPITAL | Age: 55
LOS: 4 days | Discharge: HOME HEALTH | DRG: 908 | End: 2020-07-24
Attending: INTERNAL MEDICINE | Admitting: INTERNAL MEDICINE
Payer: COMMERCIAL

## 2020-07-20 ENCOUNTER — ANESTHESIA EVENT (OUTPATIENT)
Dept: MEDSURG UNIT | Facility: HOSPITAL | Age: 55
DRG: 908 | End: 2020-07-20
Payer: COMMERCIAL

## 2020-07-20 ENCOUNTER — ANESTHESIA (OUTPATIENT)
Dept: MEDSURG UNIT | Facility: HOSPITAL | Age: 55
DRG: 908 | End: 2020-07-20
Payer: COMMERCIAL

## 2020-07-20 DIAGNOSIS — I48.91 ATRIAL FIBRILLATION, UNSPECIFIED TYPE: ICD-10-CM

## 2020-07-20 DIAGNOSIS — I48.91 ATRIAL FIBRILLATION: ICD-10-CM

## 2020-07-20 DIAGNOSIS — T14.8XXA HEMATOMA: Primary | ICD-10-CM

## 2020-07-20 DIAGNOSIS — S30.1XXA HEMATOMA OF GROIN, INITIAL ENCOUNTER: ICD-10-CM

## 2020-07-20 DIAGNOSIS — I48.0 PAF (PAROXYSMAL ATRIAL FIBRILLATION): ICD-10-CM

## 2020-07-20 LAB
ABO + RH BLD: NORMAL
APTT BLDCRRT: 24.2 SEC (ref 21–32)
BASOPHILS # BLD AUTO: 0.09 K/UL (ref 0–0.2)
BASOPHILS # BLD AUTO: 0.09 K/UL (ref 0–0.2)
BASOPHILS NFR BLD: 0.8 % (ref 0–1.9)
BASOPHILS NFR BLD: 0.8 % (ref 0–1.9)
BLD GP AB SCN CELLS X3 SERPL QL: NORMAL
DIFFERENTIAL METHOD: ABNORMAL
DIFFERENTIAL METHOD: ABNORMAL
EOSINOPHIL # BLD AUTO: 0.1 K/UL (ref 0–0.5)
EOSINOPHIL # BLD AUTO: 0.1 K/UL (ref 0–0.5)
EOSINOPHIL NFR BLD: 0.9 % (ref 0–8)
EOSINOPHIL NFR BLD: 0.9 % (ref 0–8)
ERYTHROCYTE [DISTWIDTH] IN BLOOD BY AUTOMATED COUNT: 15 % (ref 11.5–14.5)
ERYTHROCYTE [DISTWIDTH] IN BLOOD BY AUTOMATED COUNT: 15 % (ref 11.5–14.5)
HCT VFR BLD AUTO: 35.3 % (ref 37–48.5)
HCT VFR BLD AUTO: 35.3 % (ref 37–48.5)
HGB BLD-MCNC: 11 G/DL (ref 12–16)
HGB BLD-MCNC: 11 G/DL (ref 12–16)
IMM GRANULOCYTES # BLD AUTO: 0.05 K/UL (ref 0–0.04)
IMM GRANULOCYTES # BLD AUTO: 0.05 K/UL (ref 0–0.04)
IMM GRANULOCYTES NFR BLD AUTO: 0.4 % (ref 0–0.5)
IMM GRANULOCYTES NFR BLD AUTO: 0.4 % (ref 0–0.5)
INR PPP: 1 (ref 0.8–1.2)
LYMPHOCYTES # BLD AUTO: 2.2 K/UL (ref 1–4.8)
LYMPHOCYTES # BLD AUTO: 2.2 K/UL (ref 1–4.8)
LYMPHOCYTES NFR BLD: 18.7 % (ref 18–48)
LYMPHOCYTES NFR BLD: 18.7 % (ref 18–48)
MCH RBC QN AUTO: 26.8 PG (ref 27–31)
MCH RBC QN AUTO: 26.8 PG (ref 27–31)
MCHC RBC AUTO-ENTMCNC: 31.2 G/DL (ref 32–36)
MCHC RBC AUTO-ENTMCNC: 31.2 G/DL (ref 32–36)
MCV RBC AUTO: 86 FL (ref 82–98)
MCV RBC AUTO: 86 FL (ref 82–98)
MONOCYTES # BLD AUTO: 1 K/UL (ref 0.3–1)
MONOCYTES # BLD AUTO: 1 K/UL (ref 0.3–1)
MONOCYTES NFR BLD: 8.1 % (ref 4–15)
MONOCYTES NFR BLD: 8.1 % (ref 4–15)
NEUTROPHILS # BLD AUTO: 8.3 K/UL (ref 1.8–7.7)
NEUTROPHILS # BLD AUTO: 8.3 K/UL (ref 1.8–7.7)
NEUTROPHILS NFR BLD: 71.1 % (ref 38–73)
NEUTROPHILS NFR BLD: 71.1 % (ref 38–73)
NRBC BLD-RTO: 0 /100 WBC
NRBC BLD-RTO: 0 /100 WBC
PLATELET # BLD AUTO: 217 K/UL (ref 150–350)
PLATELET # BLD AUTO: 217 K/UL (ref 150–350)
PMV BLD AUTO: 11.8 FL (ref 9.2–12.9)
PMV BLD AUTO: 11.8 FL (ref 9.2–12.9)
PROTHROMBIN TIME: 10.6 SEC (ref 9–12.5)
RBC # BLD AUTO: 4.1 M/UL (ref 4–5.4)
RBC # BLD AUTO: 4.1 M/UL (ref 4–5.4)
WBC # BLD AUTO: 11.74 K/UL (ref 3.9–12.7)
WBC # BLD AUTO: 11.74 K/UL (ref 3.9–12.7)

## 2020-07-20 PROCEDURE — 93656 PR ELECTROPHYS EVAL, COMPREHEN, W/ABLATION OF ATRIAL FIBR: ICD-10-PCS | Mod: ,,, | Performed by: INTERNAL MEDICINE

## 2020-07-20 PROCEDURE — 93010 EKG 12-LEAD: ICD-10-PCS | Mod: ,,, | Performed by: INTERNAL MEDICINE

## 2020-07-20 PROCEDURE — C2630 CATH, EP, COOL-TIP: HCPCS | Performed by: INTERNAL MEDICINE

## 2020-07-20 PROCEDURE — 93613 PR INTRACARD ELECTROPHYS 3-DIMENS MAPPING: ICD-10-PCS | Mod: ,,, | Performed by: INTERNAL MEDICINE

## 2020-07-20 PROCEDURE — 25000003 PHARM REV CODE 250: Performed by: NURSE ANESTHETIST, CERTIFIED REGISTERED

## 2020-07-20 PROCEDURE — 85025 COMPLETE CBC W/AUTO DIFF WBC: CPT

## 2020-07-20 PROCEDURE — C1894 INTRO/SHEATH, NON-LASER: HCPCS | Performed by: INTERNAL MEDICINE

## 2020-07-20 PROCEDURE — C1751 CATH, INF, PER/CENT/MIDLINE: HCPCS | Performed by: ANESTHESIOLOGY

## 2020-07-20 PROCEDURE — D9220A PRA ANESTHESIA: Mod: CRNA,,, | Performed by: NURSE ANESTHETIST, CERTIFIED REGISTERED

## 2020-07-20 PROCEDURE — 63600175 PHARM REV CODE 636 W HCPCS: Performed by: NURSE ANESTHETIST, CERTIFIED REGISTERED

## 2020-07-20 PROCEDURE — 93613 INTRACARDIAC EPHYS 3D MAPG: CPT | Performed by: INTERNAL MEDICINE

## 2020-07-20 PROCEDURE — 85610 PROTHROMBIN TIME: CPT

## 2020-07-20 PROCEDURE — 36620 PR INSERT CATH,ART,PERCUT,SHORTTERM: ICD-10-PCS | Mod: 59,,, | Performed by: ANESTHESIOLOGY

## 2020-07-20 PROCEDURE — 93010 ELECTROCARDIOGRAM REPORT: CPT | Mod: ,,, | Performed by: INTERNAL MEDICINE

## 2020-07-20 PROCEDURE — 86901 BLOOD TYPING SEROLOGIC RH(D): CPT

## 2020-07-20 PROCEDURE — 86920 COMPATIBILITY TEST SPIN: CPT

## 2020-07-20 PROCEDURE — 63600175 PHARM REV CODE 636 W HCPCS: Performed by: INTERNAL MEDICINE

## 2020-07-20 PROCEDURE — 37000008 HC ANESTHESIA 1ST 15 MINUTES: Performed by: INTERNAL MEDICINE

## 2020-07-20 PROCEDURE — 27201423 OPTIME MED/SURG SUP & DEVICES STERILE SUPPLY: Performed by: INTERNAL MEDICINE

## 2020-07-20 PROCEDURE — 37000009 HC ANESTHESIA EA ADD 15 MINS: Performed by: INTERNAL MEDICINE

## 2020-07-20 PROCEDURE — D9220A PRA ANESTHESIA: ICD-10-PCS | Mod: CRNA,,, | Performed by: NURSE ANESTHETIST, CERTIFIED REGISTERED

## 2020-07-20 PROCEDURE — 20600001 HC STEP DOWN PRIVATE ROOM

## 2020-07-20 PROCEDURE — 93662 PR INTRACARD ECHO, THER/DX INTERVENT: ICD-10-PCS | Mod: 26,,, | Performed by: INTERNAL MEDICINE

## 2020-07-20 PROCEDURE — 93656 COMPRE EP EVAL ABLTJ ATR FIB: CPT | Performed by: INTERNAL MEDICINE

## 2020-07-20 PROCEDURE — D9220A PRA ANESTHESIA: ICD-10-PCS | Mod: ANES,,, | Performed by: ANESTHESIOLOGY

## 2020-07-20 PROCEDURE — 63600175 PHARM REV CODE 636 W HCPCS: Performed by: ANESTHESIOLOGY

## 2020-07-20 PROCEDURE — 25000003 PHARM REV CODE 250: Performed by: NURSE PRACTITIONER

## 2020-07-20 PROCEDURE — 93656 COMPRE EP EVAL ABLTJ ATR FIB: CPT | Mod: ,,, | Performed by: INTERNAL MEDICINE

## 2020-07-20 PROCEDURE — 25000003 PHARM REV CODE 250: Performed by: INTERNAL MEDICINE

## 2020-07-20 PROCEDURE — 85730 THROMBOPLASTIN TIME PARTIAL: CPT

## 2020-07-20 PROCEDURE — C1766 INTRO/SHEATH,STRBLE,NON-PEEL: HCPCS | Performed by: INTERNAL MEDICINE

## 2020-07-20 PROCEDURE — 93662 INTRACARDIAC ECG (ICE): CPT | Mod: 26,,, | Performed by: INTERNAL MEDICINE

## 2020-07-20 PROCEDURE — 93613 INTRACARDIAC EPHYS 3D MAPG: CPT | Mod: ,,, | Performed by: INTERNAL MEDICINE

## 2020-07-20 PROCEDURE — 93005 ELECTROCARDIOGRAM TRACING: CPT

## 2020-07-20 PROCEDURE — 36620 INSERTION CATHETER ARTERY: CPT | Mod: 59,,, | Performed by: ANESTHESIOLOGY

## 2020-07-20 PROCEDURE — D9220A PRA ANESTHESIA: Mod: ANES,,, | Performed by: ANESTHESIOLOGY

## 2020-07-20 PROCEDURE — C1753 CATH, INTRAVAS ULTRASOUND: HCPCS | Performed by: INTERNAL MEDICINE

## 2020-07-20 PROCEDURE — 27200677 HC TRANSDUCER MONITOR KIT SINGLE: Performed by: ANESTHESIOLOGY

## 2020-07-20 PROCEDURE — C1730 CATH, EP, 19 OR FEW ELECT: HCPCS | Performed by: INTERNAL MEDICINE

## 2020-07-20 PROCEDURE — 25000003 PHARM REV CODE 250: Performed by: STUDENT IN AN ORGANIZED HEALTH CARE EDUCATION/TRAINING PROGRAM

## 2020-07-20 PROCEDURE — 93662 INTRACARDIAC ECG (ICE): CPT | Performed by: INTERNAL MEDICINE

## 2020-07-20 RX ORDER — ONDANSETRON 2 MG/ML
4 INJECTION INTRAMUSCULAR; INTRAVENOUS ONCE AS NEEDED
Status: COMPLETED | OUTPATIENT
Start: 2020-07-20 | End: 2020-07-20

## 2020-07-20 RX ORDER — FENTANYL CITRATE 50 UG/ML
25 INJECTION, SOLUTION INTRAMUSCULAR; INTRAVENOUS EVERY 5 MIN PRN
Status: DISCONTINUED | OUTPATIENT
Start: 2020-07-20 | End: 2020-07-20

## 2020-07-20 RX ORDER — HYDROCHLOROTHIAZIDE 12.5 MG/1
50 TABLET ORAL DAILY
Status: DISCONTINUED | OUTPATIENT
Start: 2020-07-21 | End: 2020-07-24

## 2020-07-20 RX ORDER — EPHEDRINE SULFATE 50 MG/ML
INJECTION, SOLUTION INTRAVENOUS
Status: DISCONTINUED | OUTPATIENT
Start: 2020-07-20 | End: 2020-07-20

## 2020-07-20 RX ORDER — LIDOCAINE HCL/PF 100 MG/5ML
SYRINGE (ML) INTRAVENOUS
Status: DISCONTINUED | OUTPATIENT
Start: 2020-07-20 | End: 2020-07-20

## 2020-07-20 RX ORDER — MIDAZOLAM HYDROCHLORIDE 1 MG/ML
INJECTION INTRAMUSCULAR; INTRAVENOUS
Status: DISCONTINUED | OUTPATIENT
Start: 2020-07-20 | End: 2020-07-20

## 2020-07-20 RX ORDER — HEPARIN SODIUM 1000 [USP'U]/ML
INJECTION, SOLUTION INTRAVENOUS; SUBCUTANEOUS
Status: DISCONTINUED | OUTPATIENT
Start: 2020-07-20 | End: 2020-07-20

## 2020-07-20 RX ORDER — TALC
6 POWDER (GRAM) TOPICAL NIGHTLY PRN
Status: DISCONTINUED | OUTPATIENT
Start: 2020-07-20 | End: 2020-07-24 | Stop reason: HOSPADM

## 2020-07-20 RX ORDER — PANTOPRAZOLE SODIUM 40 MG/1
40 TABLET, DELAYED RELEASE ORAL DAILY
Status: DISCONTINUED | OUTPATIENT
Start: 2020-07-21 | End: 2020-07-24 | Stop reason: HOSPADM

## 2020-07-20 RX ORDER — FUROSEMIDE 10 MG/ML
INJECTION INTRAMUSCULAR; INTRAVENOUS
Status: DISCONTINUED | OUTPATIENT
Start: 2020-07-20 | End: 2020-07-20

## 2020-07-20 RX ORDER — PROPOFOL 10 MG/ML
VIAL (ML) INTRAVENOUS
Status: DISCONTINUED | OUTPATIENT
Start: 2020-07-20 | End: 2020-07-20

## 2020-07-20 RX ORDER — TRAZODONE HYDROCHLORIDE 100 MG/1
100 TABLET ORAL NIGHTLY PRN
Status: DISCONTINUED | OUTPATIENT
Start: 2020-07-20 | End: 2020-07-24 | Stop reason: HOSPADM

## 2020-07-20 RX ORDER — METOPROLOL SUCCINATE 50 MG/1
50 TABLET, EXTENDED RELEASE ORAL 2 TIMES DAILY
Status: DISCONTINUED | OUTPATIENT
Start: 2020-07-21 | End: 2020-07-24 | Stop reason: HOSPADM

## 2020-07-20 RX ORDER — FENTANYL CITRATE 50 UG/ML
INJECTION, SOLUTION INTRAMUSCULAR; INTRAVENOUS
Status: DISCONTINUED | OUTPATIENT
Start: 2020-07-20 | End: 2020-07-20

## 2020-07-20 RX ORDER — PROTAMINE SULFATE 10 MG/ML
INJECTION, SOLUTION INTRAVENOUS
Status: DISCONTINUED | OUTPATIENT
Start: 2020-07-20 | End: 2020-07-20

## 2020-07-20 RX ORDER — HYDROMORPHONE HYDROCHLORIDE 1 MG/ML
0.2 INJECTION, SOLUTION INTRAMUSCULAR; INTRAVENOUS; SUBCUTANEOUS EVERY 5 MIN PRN
Status: DISCONTINUED | OUTPATIENT
Start: 2020-07-20 | End: 2020-07-22

## 2020-07-20 RX ORDER — PHENYLEPHRINE HYDROCHLORIDE 10 MG/ML
INJECTION INTRAVENOUS
Status: DISCONTINUED | OUTPATIENT
Start: 2020-07-20 | End: 2020-07-20

## 2020-07-20 RX ORDER — LIDOCAINE HYDROCHLORIDE 20 MG/ML
INJECTION, SOLUTION EPIDURAL; INFILTRATION; INTRACAUDAL; PERINEURAL
Status: DISCONTINUED | OUTPATIENT
Start: 2020-07-20 | End: 2020-07-20

## 2020-07-20 RX ORDER — DIPHENHYDRAMINE HYDROCHLORIDE 50 MG/ML
25 INJECTION INTRAMUSCULAR; INTRAVENOUS EVERY 6 HOURS PRN
Status: DISCONTINUED | OUTPATIENT
Start: 2020-07-20 | End: 2020-07-22

## 2020-07-20 RX ORDER — HEPARIN SODIUM 200 [USP'U]/100ML
INJECTION, SOLUTION INTRAVENOUS
Status: DISCONTINUED | OUTPATIENT
Start: 2020-07-20 | End: 2020-07-20

## 2020-07-20 RX ORDER — KETAMINE HCL IN 0.9 % NACL 50 MG/5 ML
SYRINGE (ML) INTRAVENOUS
Status: DISCONTINUED | OUTPATIENT
Start: 2020-07-20 | End: 2020-07-20

## 2020-07-20 RX ORDER — ROCURONIUM BROMIDE 10 MG/ML
INJECTION, SOLUTION INTRAVENOUS
Status: DISCONTINUED | OUTPATIENT
Start: 2020-07-20 | End: 2020-07-20

## 2020-07-20 RX ORDER — ONDANSETRON 2 MG/ML
INJECTION INTRAMUSCULAR; INTRAVENOUS
Status: DISCONTINUED | OUTPATIENT
Start: 2020-07-20 | End: 2020-07-20

## 2020-07-20 RX ORDER — METOPROLOL SUCCINATE 50 MG/1
50 TABLET, EXTENDED RELEASE ORAL 2 TIMES DAILY
Status: DISCONTINUED | OUTPATIENT
Start: 2020-07-20 | End: 2020-07-20

## 2020-07-20 RX ORDER — ONDANSETRON 2 MG/ML
4 INJECTION INTRAMUSCULAR; INTRAVENOUS EVERY 6 HOURS PRN
Status: DISCONTINUED | OUTPATIENT
Start: 2020-07-20 | End: 2020-07-24 | Stop reason: HOSPADM

## 2020-07-20 RX ORDER — LISINOPRIL 20 MG/1
40 TABLET ORAL DAILY
Status: DISCONTINUED | OUTPATIENT
Start: 2020-07-21 | End: 2020-07-24

## 2020-07-20 RX ORDER — ACETAMINOPHEN 325 MG/1
650 TABLET ORAL EVERY 4 HOURS PRN
Status: DISCONTINUED | OUTPATIENT
Start: 2020-07-20 | End: 2020-07-24 | Stop reason: HOSPADM

## 2020-07-20 RX ORDER — DULOXETIN HYDROCHLORIDE 60 MG/1
120 CAPSULE, DELAYED RELEASE ORAL DAILY
Status: DISCONTINUED | OUTPATIENT
Start: 2020-07-21 | End: 2020-07-24 | Stop reason: HOSPADM

## 2020-07-20 RX ORDER — SUCCINYLCHOLINE CHLORIDE 20 MG/ML
INJECTION INTRAMUSCULAR; INTRAVENOUS
Status: DISCONTINUED | OUTPATIENT
Start: 2020-07-20 | End: 2020-07-20

## 2020-07-20 RX ADMIN — ONDANSETRON 4 MG: 2 INJECTION INTRAMUSCULAR; INTRAVENOUS at 02:07

## 2020-07-20 RX ADMIN — HEPARIN SODIUM 3000 UNITS: 1000 INJECTION INTRAVENOUS; SUBCUTANEOUS at 02:07

## 2020-07-20 RX ADMIN — SODIUM CHLORIDE 0.25 MCG/KG/MIN: 9 INJECTION, SOLUTION INTRAVENOUS at 11:07

## 2020-07-20 RX ADMIN — SODIUM CHLORIDE: 0.9 INJECTION, SOLUTION INTRAVENOUS at 10:07

## 2020-07-20 RX ADMIN — PHENYLEPHRINE HYDROCHLORIDE 100 MCG: 10 INJECTION INTRAVENOUS at 11:07

## 2020-07-20 RX ADMIN — LIDOCAINE HYDROCHLORIDE 40 MG: 20 INJECTION, SOLUTION INTRAVENOUS at 11:07

## 2020-07-20 RX ADMIN — PROPOFOL 50 MG: 10 INJECTION, EMULSION INTRAVENOUS at 11:07

## 2020-07-20 RX ADMIN — EPHEDRINE SULFATE 10 MG: 50 INJECTION, SOLUTION INTRAMUSCULAR; INTRAVENOUS; SUBCUTANEOUS at 11:07

## 2020-07-20 RX ADMIN — Medication 20 MG: at 11:07

## 2020-07-20 RX ADMIN — HYDROMORPHONE HYDROCHLORIDE 0.2 MG: 1 INJECTION, SOLUTION INTRAMUSCULAR; INTRAVENOUS; SUBCUTANEOUS at 09:07

## 2020-07-20 RX ADMIN — PROPOFOL 60 MG: 10 INJECTION, EMULSION INTRAVENOUS at 11:07

## 2020-07-20 RX ADMIN — HEPARIN SODIUM 15000 UNITS: 1000 INJECTION INTRAVENOUS; SUBCUTANEOUS at 12:07

## 2020-07-20 RX ADMIN — PROTAMINE SULFATE 60 MG: 10 INJECTION, SOLUTION INTRAVENOUS at 02:07

## 2020-07-20 RX ADMIN — FUROSEMIDE 20 MG: 10 INJECTION, SOLUTION INTRAMUSCULAR; INTRAVENOUS at 02:07

## 2020-07-20 RX ADMIN — HEPARIN SODIUM 8000 UNITS: 1000 INJECTION INTRAVENOUS; SUBCUTANEOUS at 12:07

## 2020-07-20 RX ADMIN — SUCCINYLCHOLINE CHLORIDE 160 MG: 20 INJECTION, SOLUTION INTRAMUSCULAR; INTRAVENOUS at 11:07

## 2020-07-20 RX ADMIN — ONDANSETRON 4 MG: 2 INJECTION INTRAMUSCULAR; INTRAVENOUS at 03:07

## 2020-07-20 RX ADMIN — FENTANYL CITRATE 25 MCG: 50 INJECTION, SOLUTION INTRAMUSCULAR; INTRAVENOUS at 12:07

## 2020-07-20 RX ADMIN — ROCURONIUM BROMIDE 5 MG: 10 INJECTION, SOLUTION INTRAVENOUS at 11:07

## 2020-07-20 RX ADMIN — PROPOFOL 150 MG: 10 INJECTION, EMULSION INTRAVENOUS at 11:07

## 2020-07-20 RX ADMIN — HEPARIN SODIUM 8000 UNITS: 1000 INJECTION INTRAVENOUS; SUBCUTANEOUS at 01:07

## 2020-07-20 RX ADMIN — ACETAMINOPHEN 650 MG: 325 TABLET ORAL at 09:07

## 2020-07-20 RX ADMIN — FENTANYL CITRATE 25 MCG: 50 INJECTION INTRAMUSCULAR; INTRAVENOUS at 03:07

## 2020-07-20 RX ADMIN — ACETAMINOPHEN 650 MG: 325 TABLET ORAL at 03:07

## 2020-07-20 RX ADMIN — MIDAZOLAM HYDROCHLORIDE 1 MG: 1 INJECTION, SOLUTION INTRAMUSCULAR; INTRAVENOUS at 11:07

## 2020-07-20 RX ADMIN — HEPARIN SODIUM 4000 UNITS: 1000 INJECTION INTRAVENOUS; SUBCUTANEOUS at 01:07

## 2020-07-20 RX ADMIN — FENTANYL CITRATE 25 MCG: 50 INJECTION, SOLUTION INTRAMUSCULAR; INTRAVENOUS at 01:07

## 2020-07-20 RX ADMIN — MIDAZOLAM HYDROCHLORIDE 1 MG: 1 INJECTION, SOLUTION INTRAMUSCULAR; INTRAVENOUS at 03:07

## 2020-07-20 RX ADMIN — HYDROMORPHONE HYDROCHLORIDE 0.2 MG: 1 INJECTION, SOLUTION INTRAMUSCULAR; INTRAVENOUS; SUBCUTANEOUS at 08:07

## 2020-07-20 RX ADMIN — FENTANYL CITRATE 50 MCG: 50 INJECTION, SOLUTION INTRAMUSCULAR; INTRAVENOUS at 11:07

## 2020-07-20 RX ADMIN — FENTANYL CITRATE 50 MCG: 50 INJECTION, SOLUTION INTRAMUSCULAR; INTRAVENOUS at 03:07

## 2020-07-20 RX ADMIN — MIDAZOLAM HYDROCHLORIDE 1 MG: 1 INJECTION, SOLUTION INTRAMUSCULAR; INTRAVENOUS at 10:07

## 2020-07-20 NOTE — ANESTHESIA PREPROCEDURE EVALUATION
07/20/2020  Vicky Alvarado is a 55 y.o., female.  Patient Active Problem List   Diagnosis    Opiate dependence    Opioid dependence in remission    Hypertension    Chronic systolic heart failure    Atrial Fibrillation (paroxysmal)    Cardiomyopathy    Avascular necrosis of lunate    Body mass index (BMI) 45.0-49.9, adult    History of opioid abuse    Severe obesity (BMI >= 40)    Pre-diabetes    Right carpal tunnel syndrome    Weakness of right hand    ERENDIRA (obstructive sleep apnea)    Atrial fibrillation         Anesthesia Evaluation          Review of Systems      Physical Exam  General:  Well nourished    Airway/Jaw/Neck:  Airway Findings: Mouth Opening: Normal Tongue: Normal  General Airway Assessment: Adult  Mallampati: II  Improves to II with phonation.  TM Distance: Normal, at least 6 cm      Dental:  Dental Findings: Upper Dentures, Lower Dentures   Chest/Lungs:  Chest/Lungs Findings: Clear to auscultation     Heart/Vascular:  Heart Findings: Rate: Normal  Rhythm: Regular Rhythm  Sounds: Normal        Mental Status:  Mental Status Findings:  Cooperative, Alert and Oriented         Anesthesia Plan  Type of Anesthesia, risks & benefits discussed:  Anesthesia Type:  general  Patient's Preference: General  Intra-op Monitoring Plan: standard ASA monitors  Intra-op Monitoring Plan Comments: Standard ASA monitors.   Post Op Pain Control Plan: per primary service following discharge from PACU  Post Op Pain Control Plan Comments: Per primary service.     Induction:   IV  Beta Blocker:  Patient is not currently on a Beta-Blocker (No further documentation required).       Informed Consent: Patient understands risks and agrees with Anesthesia plan.  Questions answered. Anesthesia consent signed with patient.  ASA Score: 4     Day of Surgery Review of History & Physical:    H&P update  referred to the surgeon.     Anesthesia Plan Notes: Chart reviewed, patient interviewed and examined.  The plan for general anesthesia was explained.  Questions were answered and the consent was signed.  Debby WAY         Ready For Surgery From Anesthesia Perspective.

## 2020-07-20 NOTE — SUBJECTIVE & OBJECTIVE
Past Medical History:   Diagnosis Date    Atrial fibrillation     cardioversion    Avascular necrosis     L hand    Depression     GERD (gastroesophageal reflux disease)     Hx of psychiatric care     Hypertension     Psychiatric problem        Past Surgical History:   Procedure Laterality Date    CARPAL TUNNEL RELEASE Right 6/10/2020    Procedure: RELEASE, CARPAL TUNNEL - RIGHT;  Surgeon: Adelaida Hall MD;  Location: [facility] OR;  Service: Orthopedics;  Laterality: Right;  GENERAL AND REGIONAL    FOOT SURGERY      TREATMENT OF CARDIAC ARRHYTHMIA N/A 1/29/2020    Procedure: CARDIOVERSION;  Surgeon: Gabriel Hawley MD;  Location: [facility] LAB;  Service: Cardiology;  Laterality: N/A;  af, demi, dccv, anes, mb, 345       Review of patient's allergies indicates:  No Known Allergies    Current Facility-Administered Medications on File Prior to Encounter   Medication    0.9%  NaCl infusion     Current Outpatient Medications on File Prior to Encounter   Medication Sig    apixaban (ELIQUIS) 5 mg Tab Take 1 tablet (5 mg total) by mouth 2 (two) times daily.    b complex vitamins capsule Take 1 capsule by mouth once daily.    [place] [place]-xxaq-reymundo-bor 750 mg-644 mg- 30 mg-1 mg Tab Take 1 tablet by mouth once daily.     hydroCHLOROthiazide (HYDRODIURIL) 50 MG tablet Take 1 tablet (50 mg total) by mouth once daily.    krill/om-3/dha/epa/phospho/ast (MEGARED OMEGA-3 KRILL OIL ORAL)     lisinopriL (PRINIVIL,ZESTRIL) 40 MG tablet Take 1 tablet (40 mg total) by mouth once daily.    MELATONIN ORAL Take 1-2 tablets by mouth nightly as needed (Sleep).    multivitamin (THERAGRAN) per tablet Take 1 tablet by mouth once daily.    omeprazole (PRILOSEC) 20 MG capsule TAKE 1 CAPSULE BY MOUTH ONCE DAILY     Family History     Problem Relation (Age of Onset)    Cataracts Mother    Diabetes Father    Hypertension Mother, Father, Sister, Brother    Thyroid disease Mother        Tobacco Use    Smoking status:  Former Smoker     Types: Cigarettes     Quit date: 2019     Years since quittin.0    Smokeless tobacco: Never Used   Substance and Sexual Activity    Alcohol use: No    Drug use: No    Sexual activity: Not on file     Review of Systems   Constitution: Positive for malaise/fatigue. Negative for decreased appetite, fever and weight gain.   HENT: Negative for congestion, hearing loss and nosebleeds.    Eyes: Negative for visual disturbance.   Cardiovascular: Positive for dyspnea on exertion and leg swelling. Negative for chest pain, irregular heartbeat, palpitations and syncope.   Respiratory: Negative for cough, shortness of breath and sleep disturbances due to breathing.    Endocrine: Negative for cold intolerance and heat intolerance.   Hematologic/Lymphatic: Negative for bleeding problem. Does not bruise/bleed easily.   Gastrointestinal: Negative for bloating, abdominal pain, change in bowel habit and heartburn.   Neurological: Negative for dizziness, loss of balance and numbness.   Psychiatric/Behavioral: Negative for altered mental status. The patient is not nervous/anxious.      Objective:     Vital Signs (Most Recent):  Temp: 97.6 °F (36.4 °C) (20 0945)  Pulse: 66 (20 0945)  Resp: 18 (20 0945)  BP: (!) 116/57 (20 0946)  SpO2: 97 % (20 0945) Vital Signs (24h Range):  Temp:  [97.6 °F (36.4 °C)] 97.6 °F (36.4 °C)  Pulse:  [66] 66  Resp:  [18] 18  SpO2:  [97 %] 97 %  BP: (108-116)/(57) 116/57       Weight: 131.5 kg (290 lb)  Body mass index is 46.81 kg/m².    SpO2: 97 %       Physical Exam   Constitutional: She is oriented to person, place, and time. Vital signs are normal. She appears well-developed and well-nourished.   HENT:   Head: Normocephalic.   Eyes: Pupils are equal, round, and reactive to light.   Neck: Normal range of motion. Neck supple. No JVD present. Carotid bruit is not present.   Cardiovascular: Normal rate, regular rhythm, S1 normal, S2 normal, normal  heart sounds and intact distal pulses. PMI is not displaced. Exam reveals no gallop and no friction rub.   No murmur heard.  Pulses:       Radial pulses are 2+ on the right side and 2+ on the left side.        Dorsalis pedis pulses are 2+ on the right side and 2+ on the left side.   Pulmonary/Chest: Effort normal and breath sounds normal. No accessory muscle usage. She has no decreased breath sounds. She has no wheezes.   Abdominal: Soft. Normal appearance and bowel sounds are normal. She exhibits no distension, no fluid wave and no ascites. There is no hepatosplenomegaly. There is no abdominal tenderness.   Musculoskeletal: Normal range of motion.         General: Edema present.      Comments: Trace BLE peripheral edema    Neurological: She is alert and oriented to person, place, and time. She has normal strength.   Skin: Skin is warm, dry and intact. No ecchymosis and no rash noted. No erythema.   Psychiatric: She has a normal mood and affect. Her speech is normal and behavior is normal. Judgment and thought content normal. Cognition and memory are normal.   Vitals reviewed.

## 2020-07-20 NOTE — ANESTHESIA PROCEDURE NOTES
Intubation  Performed by: Georgia Iverson CRNA  Authorized by: Jose Tobias MD     Intubation:     Induction:  Intravenous    Intubated:  Postinduction    Mask Ventilation:  Easy mask    Attempts:  1    Attempted By:  CRNA    Method of Intubation:  Direct    Blade:  Ng 2    Laryngeal View Grade: Grade I - full view of chords      Difficult Airway Encountered?: No      Complications:  None    Airway Device:  Oral endotracheal tube    Airway Device Size:  7.5    Style/Cuff Inflation:  Cuffed    Secured at:  The lips    Placement Verified By:  Capnometry    Complicating Factors:  Obesity    Findings Post-Intubation:  BS equal bilateral and atraumatic/condition of teeth unchanged

## 2020-07-20 NOTE — ANESTHESIA PROCEDURE NOTES
Arterial    Diagnosis: a fib    Patient location during procedure: done in OR  Procedure start time: 7/20/2020 11:20 AM  Procedure end time: 7/20/2020 11:24 AM    Staffing  Authorizing Provider: Jose Tobias MD  Performing Provider: Jose Tobias MD    Anesthesiologist was present at the time of the procedure.    Preanesthetic Checklist  Completed: patient identified, site marked, surgical consent, pre-op evaluation, timeout performed, IV checked, risks and benefits discussed, monitors and equipment checked and anesthesia consent givenArterial  Skin Prep: chlorhexidine gluconate  Local Infiltration: none  Orientation: right  Location: radial  Catheter Size: 20 G  Catheter placement by Ultrasound guidance. Heme positive aspiration all ports.  Vessel Caliber: patent, compressibility normal  Needle advanced into vessel with real time Ultrasound guidance.Insertion Attempts: 1  Assessment  Dressing: secured with tape and tegaderm

## 2020-07-20 NOTE — ASSESSMENT & PLAN NOTE
- Patient took all cardiac medications this AM.  Last dose of Eliquis 7/19/20.   - Anesthesia for sedation   - No REENA in the setting of NSR  - RFA PVI    - SHADY       Informed Consent   -The risks, benefits & alternatives of the procedure were explained to the patient.     -The risks of atrial fibrillation ablation include but are not limited to: Bleeding, infection, AF fistula, cardiac tamponade, heart rhythm abnormalities, allergic reactions, kidney injury, stroke and death.     -The risks of moderate sedation include hypotension, respiratory depression, arrhythmias, bronchospasm, & death.   -Informed consent was obtained & the patient is agreeable to proceed with the procedure.       Post procedure   - Monitor overnight on telemetry   - Bedrest x 6 hours, groin suture removal after 4 hours bedrest   - Groin protocol bilateral   - Pain relief with Tylenol 650 mg q4h PRN   - Anticoagulation: Eliquis 5 mg BID    -Protonix 40 mg daily x 4 weeks     - Follow up with Dr. Hawley in 6 weeks.

## 2020-07-20 NOTE — TRANSFER OF CARE
"Anesthesia Transfer of Care Note    Patient: Vicky Alvarado    Procedure(s) Performed: Procedure(s) (LRB):  Ablation atrial fibrillation (N/A)    Patient location: PACU    Anesthesia Type: general    Transport from OR: Transported from OR on 6-10 L/min O2 by face mask with adequate spontaneous ventilation    Post pain: adequate analgesia    Post assessment: no apparent anesthetic complications and tolerated procedure well    Post vital signs: stable    Level of consciousness: awake and alert    Nausea/Vomiting: no nausea/vomiting    Complications: none    Transfer of care protocol was followed      Last vitals:   Visit Vitals  BP (!) 116/57   Pulse 66   Temp 36.4 °C (97.6 °F) (Oral)   Resp 18   Ht 5' 6" (1.676 m)   Wt 131.5 kg (290 lb)   LMP 06/08/2015   SpO2 97%   Breastfeeding No   BMI 46.81 kg/m²     "

## 2020-07-20 NOTE — NURSING TRANSFER
Nursing Transfer Note      7/20/2020     Transfer {TRANSFER TO/306    Transfer via stretcher    Transfer with cardiac monitoring    Transported by transport    Medicines sent: none    Chart send with patient: {YES     Notified: sibling    Patient reassessed at: 1630 7/20/2020    Upon arrival to floor: cardiac monitor applied, patient oriented to room, call bell in reach and bed in lowest position belongings with pt

## 2020-07-20 NOTE — H&P
[facility] - Short Stay Cardiac Unit  Cardiac Electrophysiology  History and Physical     Admission Date: 7/20/2020  Code Status: Prior   Attending Provider: Gabriel Hawley MD   Principal Problem:Atrial fibrillation    Subjective:     Chief Complaint:  AF     HPI:  Vicky Alvarado is a 55 y.o. female who presents to short stay cardiac unit on 07/20/2020 for planned RFA PVI for atrial fibrillation with Dr. Hawley.      Ms. Alvarado has a past medical history of MVP, HTN, depression, hx opioid dependence. She was admitted 1/28-1/30/20 for progressively worsening SOB and palpitations. She was diagnosed with and treated for ADHF and AFib with RVR with diruesis and REENA+DCCV on 1/29/20.  TTE noted EF 40% with hypokinesis of all walls except septal.  PET stress 03/2020 without evidence of ischemia.   Following hospitalization, Ms. Alvarado reported ongoing SOB and palpitations.  Review of last several EKGs SR/SB. She continues on eliquis 5mg BID for stroke prophylaxis and metoprolol succinate 50 mg BID for HR control.         Given Ms. Alvarado's symptomatic pAF with arrhythmia induced CM, RFA PVI has been elected as best course of treatment. Plan to proceed with REENA (cancel if NSR) prior to procedure.       Anticoagulation: Eliquis 5 mg BID, last dose taken on 7/19/20 in the evening   [id number] score = 3   Review of labs: Kidney function and LFTs normal. Hgb 13.0.   COVID 19 Test: NOT DETECTED on 7/17/20     EKG:   My independent visualization of most recent EKG is NSR.       Past Medical History:   Diagnosis Date    Atrial fibrillation     cardioversion    Avascular necrosis     L hand    Depression     GERD (gastroesophageal reflux disease)     Hx of psychiatric care     Hypertension     Psychiatric problem        Past Surgical History:   Procedure Laterality Date    CARPAL TUNNEL RELEASE Right 6/10/2020    Procedure: RELEASE, CARPAL TUNNEL - RIGHT;  Surgeon: Adelaida Hall,  MD;  Location: [facility] OR;  Service: Orthopedics;  Laterality: Right;  GENERAL AND REGIONAL    FOOT SURGERY      TREATMENT OF CARDIAC ARRHYTHMIA N/A 2020    Procedure: CARDIOVERSION;  Surgeon: Gabriel Hawley MD;  Location: [facility] EP LAB;  Service: Cardiology;  Laterality: N/A;  af, demi, dccv, anes, mb, 345       Review of patient's allergies indicates:  No Known Allergies    Current Facility-Administered Medications on File Prior to Encounter   Medication    0.9%  NaCl infusion     Current Outpatient Medications on File Prior to Encounter   Medication Sig    apixaban (ELIQUIS) 5 mg Tab Take 1 tablet (5 mg total) by mouth 2 (two) times daily.    b complex vitamins capsule Take 1 capsule by mouth once daily.    gluc-shikha-[facility] [place]-R-tchj-reymundo-bor 750 mg-644 mg- 30 mg-1 mg Tab Take 1 tablet by mouth once daily.     hydroCHLOROthiazide (HYDRODIURIL) 50 MG tablet Take 1 tablet (50 mg total) by mouth once daily.    krill/om-3/dha/epa/phospho/ast (MEGARED OMEGA-3 KRILL OIL ORAL)     lisinopriL (PRINIVIL,ZESTRIL) 40 MG tablet Take 1 tablet (40 mg total) by mouth once daily.    MELATONIN ORAL Take 1-2 tablets by mouth nightly as needed (Sleep).    multivitamin (THERAGRAN) per tablet Take 1 tablet by mouth once daily.    omeprazole (PRILOSEC) 20 MG capsule TAKE 1 CAPSULE BY MOUTH ONCE DAILY     Family History     Problem Relation (Age of Onset)    Cataracts Mother    Diabetes Father    Hypertension Mother, Father, Sister, Brother    Thyroid disease Mother        Tobacco Use    Smoking status: Former Smoker     Types: Cigarettes     Quit date: 2019     Years since quittin.0    Smokeless tobacco: Never Used   Substance and Sexual Activity    Alcohol use: No    Drug use: No    Sexual activity: Not on file     Review of Systems   Constitution: Positive for malaise/fatigue. Negative for decreased appetite, fever and weight gain.   HENT: Negative for congestion, hearing loss and nosebleeds.    Eyes:  Negative for visual disturbance.   Cardiovascular: Positive for dyspnea on exertion and leg swelling. Negative for chest pain, irregular heartbeat, palpitations and syncope.   Respiratory: Negative for cough, shortness of breath and sleep disturbances due to breathing.    Endocrine: Negative for cold intolerance and heat intolerance.   Hematologic/Lymphatic: Negative for bleeding problem. Does not bruise/bleed easily.   Gastrointestinal: Negative for bloating, abdominal pain, change in bowel habit and heartburn.   Neurological: Negative for dizziness, loss of balance and numbness.   Psychiatric/Behavioral: Negative for altered mental status. The patient is not nervous/anxious.      Objective:     Vital Signs (Most Recent):  Temp: 97.6 °F (36.4 °C) (07/20/20 0945)  Pulse: 66 (07/20/20 0945)  Resp: 18 (07/20/20 0945)  BP: (!) 116/57 (07/20/20 0946)  SpO2: 97 % (07/20/20 0945) Vital Signs (24h Range):  Temp:  [97.6 °F (36.4 °C)] 97.6 °F (36.4 °C)  Pulse:  [66] 66  Resp:  [18] 18  SpO2:  [97 %] 97 %  BP: (108-116)/(57) 116/57       Weight: 131.5 kg (290 lb)  Body mass index is 46.81 kg/m².    SpO2: 97 %       Physical Exam   Constitutional: She is oriented to person, place, and time. Vital signs are normal. She appears well-developed and well-nourished.   HENT:   Head: Normocephalic.   Eyes: Pupils are equal, round, and reactive to light.   Neck: Normal range of motion. Neck supple. No JVD present. Carotid bruit is not present.   Cardiovascular: Normal rate, regular rhythm, S1 normal, S2 normal, normal heart sounds and intact distal pulses. PMI is not displaced. Exam reveals no gallop and no friction rub.   No murmur heard.  Pulses:       Radial pulses are 2+ on the right side and 2+ on the left side.        Dorsalis pedis pulses are 2+ on the right side and 2+ on the left side.   Pulmonary/Chest: Effort normal and breath sounds normal. No accessory muscle usage. She has no decreased breath sounds. She has no wheezes.    Abdominal: Soft. Normal appearance and bowel sounds are normal. She exhibits no distension, no fluid wave and no ascites. There is no hepatosplenomegaly. There is no abdominal tenderness.   Musculoskeletal: Normal range of motion.         General: Edema present.      Comments: Trace BLE peripheral edema    Neurological: She is alert and oriented to person, place, and time. She has normal strength.   Skin: Skin is warm, dry and intact. No ecchymosis and no rash noted. No erythema.   Psychiatric: She has a normal mood and affect. Her speech is normal and behavior is normal. Judgment and thought content normal. Cognition and memory are normal.   Vitals reviewed.        Assessment and Plan:     * Atrial fibrillation  - Patient took all cardiac medications this AM.  Last dose of Eliquis 7/19/20.   - Anesthesia for sedation   - No REENA in the setting of NSR  - RFA PVI    - SHADY       Informed Consent   -The risks, benefits & alternatives of the procedure were explained to the patient.     -The risks of atrial fibrillation ablation include but are not limited to: Bleeding, infection, AF fistula, cardiac tamponade, heart rhythm abnormalities, allergic reactions, kidney injury, stroke and death.     -The risks of moderate sedation include hypotension, respiratory depression, arrhythmias, bronchospasm, & death.   -Informed consent was obtained & the patient is agreeable to proceed with the procedure.       Post procedure   - Monitor overnight on telemetry   - Bedrest x 6 hours, groin suture removal after 4 hours bedrest   - Groin protocol bilateral   - Pain relief with Tylenol 650 mg q4h PRN   - Anticoagulation: Eliquis 5 mg BID    -Protonix 40 mg daily x 4 weeks     - Follow up with Dr. Hawley in 6 weeks.       Laurie Montgomery NP  Cardiac Electrophysiology  [facility] - Short Stay Cardiac Unit

## 2020-07-20 NOTE — HPI
Vicky Alvarado is a 55 y.o. female who presents to short stay cardiac unit on 07/20/2020 for planned RFA PVI for atrial fibrillation with Dr. Hawley.      Ms. Alvarado has a past medical history of MVP, HTN, depression, hx opioid dependence. She was admitted 1/28-1/30/20 for progressively worsening SOB and palpitations. She was diagnosed with and treated for ADHF and AFib with RVR with diruesis and REENA+DCCV on 1/29/20.  TTE noted EF 40% with hypokinesis of all walls except septal.  PET stress 03/2020 without evidence of ischemia.   Following hospitalization, Ms. Alvarado reported ongoing SOB and palpitations.  Review of last several EKGs SR/SB. She continues on eliquis 5mg BID for stroke prophylaxis and metoprolol succinate 50 mg BID for HR control.         Given Ms. Alvarado's symptomatic pAF with arrhythmia induced CM, RFA PVI has been elected as best course of treatment. Plan to proceed with REENA (cancel if NSR) prior to procedure.       Anticoagulation: Eliquis 5 mg BID, last dose taken on 7/19/20 in the evening   [id number] score = 3   Review of labs: Kidney function and LFTs normal. Hgb 13.0.   COVID 19 Test: NOT DETECTED on 7/17/20     EKG:   My independent visualization of most recent EKG is NSR.

## 2020-07-21 ENCOUNTER — ANESTHESIA (OUTPATIENT)
Dept: SURGERY | Facility: HOSPITAL | Age: 55
DRG: 908 | End: 2020-07-21
Payer: COMMERCIAL

## 2020-07-21 ENCOUNTER — ANESTHESIA EVENT (OUTPATIENT)
Dept: SURGERY | Facility: HOSPITAL | Age: 55
DRG: 908 | End: 2020-07-21
Payer: COMMERCIAL

## 2020-07-21 PROBLEM — T14.8XXA HEMATOMA: Status: ACTIVE | Noted: 2020-07-21

## 2020-07-21 PROBLEM — S30.1XXA HEMATOMA OF GROIN: Status: ACTIVE | Noted: 2020-07-21

## 2020-07-21 PROBLEM — F32.A DEPRESSION: Status: ACTIVE | Noted: 2020-07-21

## 2020-07-21 LAB
ANION GAP SERPL CALC-SCNC: 8 MMOL/L (ref 8–16)
ANISOCYTOSIS BLD QL SMEAR: SLIGHT
BASO STIPL BLD QL SMEAR: ABNORMAL
BASOPHILS # BLD AUTO: 0.05 K/UL (ref 0–0.2)
BASOPHILS # BLD AUTO: 0.05 K/UL (ref 0–0.2)
BASOPHILS # BLD AUTO: 0.1 K/UL (ref 0–0.2)
BASOPHILS NFR BLD: 0.3 % (ref 0–1.9)
BASOPHILS NFR BLD: 0.3 % (ref 0–1.9)
BASOPHILS NFR BLD: 0.7 % (ref 0–1.9)
BLD PROD TYP BPU: NORMAL
BLD PROD TYP BPU: NORMAL
BLOOD UNIT EXPIRATION DATE: NORMAL
BLOOD UNIT EXPIRATION DATE: NORMAL
BLOOD UNIT TYPE CODE: 9500
BLOOD UNIT TYPE CODE: 9500
BLOOD UNIT TYPE: NORMAL
BLOOD UNIT TYPE: NORMAL
BUN SERPL-MCNC: 19 MG/DL (ref 6–20)
CALCIUM SERPL-MCNC: 7.7 MG/DL (ref 8.7–10.5)
CHLORIDE SERPL-SCNC: 101 MMOL/L (ref 95–110)
CO2 SERPL-SCNC: 25 MMOL/L (ref 23–29)
CODING SYSTEM: NORMAL
CODING SYSTEM: NORMAL
CREAT SERPL-MCNC: 0.9 MG/DL (ref 0.5–1.4)
DIFFERENTIAL METHOD: ABNORMAL
DISPENSE STATUS: NORMAL
DISPENSE STATUS: NORMAL
EOSINOPHIL # BLD AUTO: 0 K/UL (ref 0–0.5)
EOSINOPHIL NFR BLD: 0 % (ref 0–8)
EOSINOPHIL NFR BLD: 0.1 % (ref 0–8)
EOSINOPHIL NFR BLD: 0.2 % (ref 0–8)
ERYTHROCYTE [DISTWIDTH] IN BLOOD BY AUTOMATED COUNT: 14.6 % (ref 11.5–14.5)
ERYTHROCYTE [DISTWIDTH] IN BLOOD BY AUTOMATED COUNT: 14.7 % (ref 11.5–14.5)
ERYTHROCYTE [DISTWIDTH] IN BLOOD BY AUTOMATED COUNT: 15.4 % (ref 11.5–14.5)
EST. GFR  (AFRICAN AMERICAN): >60 ML/MIN/1.73 M^2
EST. GFR  (NON AFRICAN AMERICAN): >60 ML/MIN/1.73 M^2
GLUCOSE SERPL-MCNC: 157 MG/DL (ref 70–110)
GLUCOSE SERPL-MCNC: 160 MG/DL (ref 70–110)
GLUCOSE SERPL-MCNC: 169 MG/DL (ref 70–110)
HCO3 UR-SCNC: 21.9 MMOL/L (ref 24–28)
HCO3 UR-SCNC: 24.1 MMOL/L (ref 24–28)
HCT VFR BLD AUTO: 27.9 % (ref 37–48.5)
HCT VFR BLD AUTO: 30.2 % (ref 37–48.5)
HCT VFR BLD AUTO: 31 % (ref 37–48.5)
HCT VFR BLD CALC: 21 %PCV (ref 36–54)
HCT VFR BLD CALC: 31 %PCV (ref 36–54)
HGB BLD-MCNC: 10.2 G/DL (ref 12–16)
HGB BLD-MCNC: 9.2 G/DL (ref 12–16)
HGB BLD-MCNC: 9.8 G/DL (ref 12–16)
IMM GRANULOCYTES # BLD AUTO: 0.1 K/UL (ref 0–0.04)
IMM GRANULOCYTES # BLD AUTO: 0.11 K/UL (ref 0–0.04)
IMM GRANULOCYTES # BLD AUTO: 0.12 K/UL (ref 0–0.04)
IMM GRANULOCYTES NFR BLD AUTO: 0.6 % (ref 0–0.5)
IMM GRANULOCYTES NFR BLD AUTO: 0.7 % (ref 0–0.5)
IMM GRANULOCYTES NFR BLD AUTO: 0.7 % (ref 0–0.5)
LACTATE SERPL-SCNC: 2.2 MMOL/L (ref 0.5–2.2)
LYMPHOCYTES # BLD AUTO: 1 K/UL (ref 1–4.8)
LYMPHOCYTES # BLD AUTO: 1.4 K/UL (ref 1–4.8)
LYMPHOCYTES # BLD AUTO: 1.6 K/UL (ref 1–4.8)
LYMPHOCYTES NFR BLD: 10 % (ref 18–48)
LYMPHOCYTES NFR BLD: 5.5 % (ref 18–48)
LYMPHOCYTES NFR BLD: 9.3 % (ref 18–48)
MCH RBC QN AUTO: 27.8 PG (ref 27–31)
MCH RBC QN AUTO: 28.3 PG (ref 27–31)
MCH RBC QN AUTO: 28.7 PG (ref 27–31)
MCHC RBC AUTO-ENTMCNC: 32.5 G/DL (ref 32–36)
MCHC RBC AUTO-ENTMCNC: 32.9 G/DL (ref 32–36)
MCHC RBC AUTO-ENTMCNC: 33 G/DL (ref 32–36)
MCV RBC AUTO: 85 FL (ref 82–98)
MCV RBC AUTO: 87 FL (ref 82–98)
MCV RBC AUTO: 87 FL (ref 82–98)
MONOCYTES # BLD AUTO: 1 K/UL (ref 0.3–1)
MONOCYTES # BLD AUTO: 1.3 K/UL (ref 0.3–1)
MONOCYTES # BLD AUTO: 1.7 K/UL (ref 0.3–1)
MONOCYTES NFR BLD: 10.9 % (ref 4–15)
MONOCYTES NFR BLD: 5.6 % (ref 4–15)
MONOCYTES NFR BLD: 8.3 % (ref 4–15)
NEUTROPHILS # BLD AUTO: 12.2 K/UL (ref 1.8–7.7)
NEUTROPHILS # BLD AUTO: 12.3 K/UL (ref 1.8–7.7)
NEUTROPHILS # BLD AUTO: 15.8 K/UL (ref 1.8–7.7)
NEUTROPHILS NFR BLD: 78.1 % (ref 38–73)
NEUTROPHILS NFR BLD: 80.8 % (ref 38–73)
NEUTROPHILS NFR BLD: 87.9 % (ref 38–73)
NRBC BLD-RTO: 0 /100 WBC
NUM UNITS TRANS PACKED RBC: NORMAL
PCO2 BLDA: 41.9 MMHG (ref 35–45)
PCO2 BLDA: 47.7 MMHG (ref 35–45)
PH SMN: 7.31 [PH] (ref 7.35–7.45)
PH SMN: 7.33 [PH] (ref 7.35–7.45)
PLATELET # BLD AUTO: 165 K/UL (ref 150–350)
PLATELET # BLD AUTO: 193 K/UL (ref 150–350)
PLATELET # BLD AUTO: 220 K/UL (ref 150–350)
PLATELET BLD QL SMEAR: ABNORMAL
PMV BLD AUTO: 10.8 FL (ref 9.2–12.9)
PMV BLD AUTO: 11.5 FL (ref 9.2–12.9)
PMV BLD AUTO: 12.2 FL (ref 9.2–12.9)
PO2 BLDA: 82 MMHG (ref 80–100)
PO2 BLDA: 97 MMHG (ref 80–100)
POC BE: -2 MMOL/L
POC BE: -4 MMOL/L
POC IONIZED CALCIUM: 1.03 MMOL/L (ref 1.06–1.42)
POC IONIZED CALCIUM: 1.12 MMOL/L (ref 1.06–1.42)
POC SATURATED O2: 95 % (ref 95–100)
POC SATURATED O2: 97 % (ref 95–100)
POC TCO2: 23 MMOL/L (ref 23–27)
POC TCO2: 26 MMOL/L (ref 23–27)
POLYCHROMASIA BLD QL SMEAR: ABNORMAL
POTASSIUM BLD-SCNC: 3.8 MMOL/L (ref 3.5–5.1)
POTASSIUM BLD-SCNC: 4.3 MMOL/L (ref 3.5–5.1)
POTASSIUM SERPL-SCNC: 4.2 MMOL/L (ref 3.5–5.1)
RBC # BLD AUTO: 3.21 M/UL (ref 4–5.4)
RBC # BLD AUTO: 3.46 M/UL (ref 4–5.4)
RBC # BLD AUTO: 3.67 M/UL (ref 4–5.4)
SAMPLE: ABNORMAL
SAMPLE: ABNORMAL
SODIUM BLD-SCNC: 135 MMOL/L (ref 136–145)
SODIUM BLD-SCNC: 137 MMOL/L (ref 136–145)
SODIUM SERPL-SCNC: 134 MMOL/L (ref 136–145)
TRANS ERYTHROCYTES VOL PATIENT: NORMAL ML
WBC # BLD AUTO: 15.1 K/UL (ref 3.9–12.7)
WBC # BLD AUTO: 15.8 K/UL (ref 3.9–12.7)
WBC # BLD AUTO: 17.97 K/UL (ref 3.9–12.7)

## 2020-07-21 PROCEDURE — D9220A PRA ANESTHESIA: ICD-10-PCS | Mod: ANES,,, | Performed by: ANESTHESIOLOGY

## 2020-07-21 PROCEDURE — 35226 REPAIR BLOOD VESSEL DIR LXTR: CPT | Mod: LT,,, | Performed by: SURGERY

## 2020-07-21 PROCEDURE — C1729 CATH, DRAINAGE: HCPCS | Performed by: SURGERY

## 2020-07-21 PROCEDURE — 80048 BASIC METABOLIC PNL TOTAL CA: CPT

## 2020-07-21 PROCEDURE — P9017 PLASMA 1 DONOR FRZ W/IN 8 HR: HCPCS

## 2020-07-21 PROCEDURE — 25000003 PHARM REV CODE 250: Performed by: NURSE ANESTHETIST, CERTIFIED REGISTERED

## 2020-07-21 PROCEDURE — 63600175 PHARM REV CODE 636 W HCPCS: Performed by: NURSE ANESTHETIST, CERTIFIED REGISTERED

## 2020-07-21 PROCEDURE — 63600175 PHARM REV CODE 636 W HCPCS: Performed by: STUDENT IN AN ORGANIZED HEALTH CARE EDUCATION/TRAINING PROGRAM

## 2020-07-21 PROCEDURE — 20600001 HC STEP DOWN PRIVATE ROOM

## 2020-07-21 PROCEDURE — P9016 RBC LEUKOCYTES REDUCED: HCPCS

## 2020-07-21 PROCEDURE — 36000706: Performed by: SURGERY

## 2020-07-21 PROCEDURE — 25000003 PHARM REV CODE 250: Performed by: NURSE PRACTITIONER

## 2020-07-21 PROCEDURE — 76937 US GUIDE VASCULAR ACCESS: CPT | Mod: 26,,, | Performed by: ANESTHESIOLOGY

## 2020-07-21 PROCEDURE — 76937 PR  US GUIDE, VASCULAR ACCESS: ICD-10-PCS | Mod: 26,,, | Performed by: ANESTHESIOLOGY

## 2020-07-21 PROCEDURE — 63600175 PHARM REV CODE 636 W HCPCS: Performed by: ANESTHESIOLOGY

## 2020-07-21 PROCEDURE — 36620 INSERTION CATHETER ARTERY: CPT | Mod: 59,,, | Performed by: ANESTHESIOLOGY

## 2020-07-21 PROCEDURE — 85025 COMPLETE CBC W/AUTO DIFF WBC: CPT | Mod: 91

## 2020-07-21 PROCEDURE — 37000009 HC ANESTHESIA EA ADD 15 MINS: Performed by: SURGERY

## 2020-07-21 PROCEDURE — 71000033 HC RECOVERY, INTIAL HOUR: Performed by: SURGERY

## 2020-07-21 PROCEDURE — P9021 RED BLOOD CELLS UNIT: HCPCS

## 2020-07-21 PROCEDURE — 71000015 HC POSTOP RECOV 1ST HR: Performed by: SURGERY

## 2020-07-21 PROCEDURE — 36556 PR INSERT NON-TUNNEL CV CATH 5+ YRS OLD: ICD-10-PCS | Mod: 59,,, | Performed by: ANESTHESIOLOGY

## 2020-07-21 PROCEDURE — D9220A PRA ANESTHESIA: ICD-10-PCS | Mod: CRNA,,, | Performed by: NURSE ANESTHETIST, CERTIFIED REGISTERED

## 2020-07-21 PROCEDURE — 36620 PR INSERT CATH,ART,PERCUT,SHORTTERM: ICD-10-PCS | Mod: 59,,, | Performed by: ANESTHESIOLOGY

## 2020-07-21 PROCEDURE — D9220A PRA ANESTHESIA: Mod: ANES,,, | Performed by: ANESTHESIOLOGY

## 2020-07-21 PROCEDURE — 83605 ASSAY OF LACTIC ACID: CPT

## 2020-07-21 PROCEDURE — 25000003 PHARM REV CODE 250: Performed by: INTERNAL MEDICINE

## 2020-07-21 PROCEDURE — 35226 PR REBL VES DIRECT,LOW EXTREM: ICD-10-PCS | Mod: LT,,, | Performed by: SURGERY

## 2020-07-21 PROCEDURE — 36000707: Performed by: SURGERY

## 2020-07-21 PROCEDURE — 36415 COLL VENOUS BLD VENIPUNCTURE: CPT

## 2020-07-21 PROCEDURE — 94761 N-INVAS EAR/PLS OXIMETRY MLT: CPT

## 2020-07-21 PROCEDURE — 36556 INSERT NON-TUNNEL CV CATH: CPT | Mod: 59,,, | Performed by: ANESTHESIOLOGY

## 2020-07-21 PROCEDURE — 63600175 PHARM REV CODE 636 W HCPCS: Performed by: SURGERY

## 2020-07-21 PROCEDURE — 99223 PR INITIAL HOSPITAL CARE,LEVL III: ICD-10-PCS | Mod: 57,,, | Performed by: SURGERY

## 2020-07-21 PROCEDURE — 99223 1ST HOSP IP/OBS HIGH 75: CPT | Mod: 57,,, | Performed by: SURGERY

## 2020-07-21 PROCEDURE — 71000016 HC POSTOP RECOV ADDL HR: Performed by: SURGERY

## 2020-07-21 PROCEDURE — 37000008 HC ANESTHESIA 1ST 15 MINUTES: Performed by: SURGERY

## 2020-07-21 PROCEDURE — 27000221 HC OXYGEN, UP TO 24 HOURS

## 2020-07-21 PROCEDURE — D9220A PRA ANESTHESIA: Mod: CRNA,,, | Performed by: NURSE ANESTHETIST, CERTIFIED REGISTERED

## 2020-07-21 RX ORDER — CEFAZOLIN SODIUM 1 G/3ML
INJECTION, POWDER, FOR SOLUTION INTRAMUSCULAR; INTRAVENOUS
Status: DISCONTINUED | OUTPATIENT
Start: 2020-07-21 | End: 2020-07-21

## 2020-07-21 RX ORDER — ACETAMINOPHEN 10 MG/ML
INJECTION, SOLUTION INTRAVENOUS
Status: DISCONTINUED | OUTPATIENT
Start: 2020-07-21 | End: 2020-07-21

## 2020-07-21 RX ORDER — HYDROMORPHONE HYDROCHLORIDE 1 MG/ML
0.2 INJECTION, SOLUTION INTRAMUSCULAR; INTRAVENOUS; SUBCUTANEOUS EVERY 5 MIN PRN
Status: DISCONTINUED | OUTPATIENT
Start: 2020-07-21 | End: 2020-07-22

## 2020-07-21 RX ORDER — HYDROCODONE BITARTRATE AND ACETAMINOPHEN 500; 5 MG/1; MG/1
TABLET ORAL
Status: CANCELLED | OUTPATIENT
Start: 2020-07-21

## 2020-07-21 RX ORDER — ROCURONIUM BROMIDE 10 MG/ML
INJECTION, SOLUTION INTRAVENOUS
Status: DISCONTINUED | OUTPATIENT
Start: 2020-07-21 | End: 2020-07-21

## 2020-07-21 RX ORDER — HEPARIN SODIUM 1000 [USP'U]/ML
INJECTION, SOLUTION INTRAVENOUS; SUBCUTANEOUS
Status: DISCONTINUED | OUTPATIENT
Start: 2020-07-21 | End: 2020-07-21 | Stop reason: HOSPADM

## 2020-07-21 RX ORDER — OXYCODONE HYDROCHLORIDE 5 MG/1
5 TABLET ORAL EVERY 4 HOURS PRN
Status: DISCONTINUED | OUTPATIENT
Start: 2020-07-21 | End: 2020-07-24 | Stop reason: HOSPADM

## 2020-07-21 RX ORDER — SODIUM CHLORIDE 0.9 % (FLUSH) 0.9 %
3 SYRINGE (ML) INJECTION
Status: DISCONTINUED | OUTPATIENT
Start: 2020-07-21 | End: 2020-07-24 | Stop reason: HOSPADM

## 2020-07-21 RX ORDER — HYDROMORPHONE HYDROCHLORIDE 1 MG/ML
0.5 INJECTION, SOLUTION INTRAMUSCULAR; INTRAVENOUS; SUBCUTANEOUS
Status: CANCELLED | OUTPATIENT
Start: 2020-07-21

## 2020-07-21 RX ORDER — ONDANSETRON 2 MG/ML
INJECTION INTRAMUSCULAR; INTRAVENOUS
Status: DISCONTINUED | OUTPATIENT
Start: 2020-07-21 | End: 2020-07-21

## 2020-07-21 RX ORDER — KETAMINE HCL IN 0.9 % NACL 50 MG/5 ML
SYRINGE (ML) INTRAVENOUS
Status: DISCONTINUED | OUTPATIENT
Start: 2020-07-21 | End: 2020-07-21

## 2020-07-21 RX ORDER — PROPOFOL 10 MG/ML
VIAL (ML) INTRAVENOUS
Status: DISCONTINUED | OUTPATIENT
Start: 2020-07-21 | End: 2020-07-21

## 2020-07-21 RX ORDER — MIDAZOLAM HYDROCHLORIDE 1 MG/ML
INJECTION, SOLUTION INTRAMUSCULAR; INTRAVENOUS
Status: DISCONTINUED | OUTPATIENT
Start: 2020-07-21 | End: 2020-07-21

## 2020-07-21 RX ORDER — PHENYLEPHRINE HYDROCHLORIDE 10 MG/ML
INJECTION INTRAVENOUS
Status: DISCONTINUED | OUTPATIENT
Start: 2020-07-21 | End: 2020-07-21

## 2020-07-21 RX ORDER — PROTAMINE SULFATE 10 MG/ML
INJECTION, SOLUTION INTRAVENOUS
Status: DISCONTINUED | OUTPATIENT
Start: 2020-07-21 | End: 2020-07-21

## 2020-07-21 RX ORDER — HYDROMORPHONE HYDROCHLORIDE 1 MG/ML
1 INJECTION, SOLUTION INTRAMUSCULAR; INTRAVENOUS; SUBCUTANEOUS ONCE
Status: COMPLETED | OUTPATIENT
Start: 2020-07-21 | End: 2020-07-21

## 2020-07-21 RX ORDER — FENTANYL CITRATE 50 UG/ML
INJECTION, SOLUTION INTRAMUSCULAR; INTRAVENOUS
Status: DISCONTINUED | OUTPATIENT
Start: 2020-07-21 | End: 2020-07-21

## 2020-07-21 RX ORDER — OXYCODONE AND ACETAMINOPHEN 5; 325 MG/1; MG/1
1 TABLET ORAL EVERY 4 HOURS PRN
Status: CANCELLED | OUTPATIENT
Start: 2020-07-21

## 2020-07-21 RX ORDER — OXYCODONE AND ACETAMINOPHEN 10; 325 MG/1; MG/1
1 TABLET ORAL EVERY 4 HOURS PRN
Status: CANCELLED | OUTPATIENT
Start: 2020-07-21

## 2020-07-21 RX ORDER — AMOXICILLIN 250 MG
1 CAPSULE ORAL DAILY PRN
Status: DISCONTINUED | OUTPATIENT
Start: 2020-07-21 | End: 2020-07-24 | Stop reason: HOSPADM

## 2020-07-21 RX ORDER — LIDOCAINE HYDROCHLORIDE 20 MG/ML
INJECTION INTRAVENOUS
Status: DISCONTINUED | OUTPATIENT
Start: 2020-07-21 | End: 2020-07-21

## 2020-07-21 RX ORDER — HEPARIN SODIUM 1000 [USP'U]/ML
INJECTION, SOLUTION INTRAVENOUS; SUBCUTANEOUS
Status: DISCONTINUED | OUTPATIENT
Start: 2020-07-21 | End: 2020-07-21

## 2020-07-21 RX ORDER — SUCCINYLCHOLINE CHLORIDE 20 MG/ML
INJECTION INTRAMUSCULAR; INTRAVENOUS
Status: DISCONTINUED | OUTPATIENT
Start: 2020-07-21 | End: 2020-07-21

## 2020-07-21 RX ORDER — DEXMEDETOMIDINE HYDROCHLORIDE 100 UG/ML
INJECTION, SOLUTION INTRAVENOUS
Status: DISCONTINUED | OUTPATIENT
Start: 2020-07-21 | End: 2020-07-21

## 2020-07-21 RX ORDER — CALCIUM CARBONATE 200(500)MG
500 TABLET,CHEWABLE ORAL DAILY PRN
Status: DISCONTINUED | OUTPATIENT
Start: 2020-07-21 | End: 2020-07-24 | Stop reason: HOSPADM

## 2020-07-21 RX ADMIN — HYDROCHLOROTHIAZIDE 50 MG: 12.5 TABLET ORAL at 12:07

## 2020-07-21 RX ADMIN — PROPOFOL 100 MG: 10 INJECTION, EMULSION INTRAVENOUS at 03:07

## 2020-07-21 RX ADMIN — Medication 10 MG: at 06:07

## 2020-07-21 RX ADMIN — FENTANYL CITRATE 25 MCG: 50 INJECTION, SOLUTION INTRAMUSCULAR; INTRAVENOUS at 05:07

## 2020-07-21 RX ADMIN — HYDROMORPHONE HYDROCHLORIDE 0.2 MG: 1 INJECTION, SOLUTION INTRAMUSCULAR; INTRAVENOUS; SUBCUTANEOUS at 12:07

## 2020-07-21 RX ADMIN — Medication 10 MG: at 05:07

## 2020-07-21 RX ADMIN — OXYCODONE HYDROCHLORIDE 5 MG: 5 TABLET ORAL at 06:07

## 2020-07-21 RX ADMIN — CEFAZOLIN 3 G: 330 INJECTION, POWDER, FOR SOLUTION INTRAMUSCULAR; INTRAVENOUS at 03:07

## 2020-07-21 RX ADMIN — ROCURONIUM BROMIDE 10 MG: 10 INJECTION, SOLUTION INTRAVENOUS at 03:07

## 2020-07-21 RX ADMIN — DULOXETINE HYDROCHLORIDE 120 MG: 60 CAPSULE, DELAYED RELEASE ORAL at 12:07

## 2020-07-21 RX ADMIN — MIDAZOLAM HYDROCHLORIDE 2 MG: 1 INJECTION, SOLUTION INTRAMUSCULAR; INTRAVENOUS at 03:07

## 2020-07-21 RX ADMIN — METOPROLOL SUCCINATE 50 MG: 50 TABLET, EXTENDED RELEASE ORAL at 12:07

## 2020-07-21 RX ADMIN — PHENYLEPHRINE HYDROCHLORIDE 200 MCG: 10 INJECTION INTRAVENOUS at 04:07

## 2020-07-21 RX ADMIN — HYDROMORPHONE HYDROCHLORIDE 0.2 MG: 1 INJECTION, SOLUTION INTRAMUSCULAR; INTRAVENOUS; SUBCUTANEOUS at 06:07

## 2020-07-21 RX ADMIN — ROCURONIUM BROMIDE 20 MG: 10 INJECTION, SOLUTION INTRAVENOUS at 04:07

## 2020-07-21 RX ADMIN — Medication 30 MG: at 05:07

## 2020-07-21 RX ADMIN — OXYCODONE HYDROCHLORIDE 5 MG: 5 TABLET ORAL at 11:07

## 2020-07-21 RX ADMIN — SODIUM CHLORIDE, SODIUM GLUCONATE, SODIUM ACETATE, POTASSIUM CHLORIDE, MAGNESIUM CHLORIDE, SODIUM PHOSPHATE, DIBASIC, AND POTASSIUM PHOSPHATE: .53; .5; .37; .037; .03; .012; .00082 INJECTION, SOLUTION INTRAVENOUS at 05:07

## 2020-07-21 RX ADMIN — ROCURONIUM BROMIDE 30 MG: 10 INJECTION, SOLUTION INTRAVENOUS at 03:07

## 2020-07-21 RX ADMIN — ROCURONIUM BROMIDE 10 MG: 10 INJECTION, SOLUTION INTRAVENOUS at 05:07

## 2020-07-21 RX ADMIN — PROTAMINE SULFATE 80 MG: 10 INJECTION, SOLUTION INTRAVENOUS at 05:07

## 2020-07-21 RX ADMIN — SODIUM CHLORIDE, SODIUM GLUCONATE, SODIUM ACETATE, POTASSIUM CHLORIDE, MAGNESIUM CHLORIDE, SODIUM PHOSPHATE, DIBASIC, AND POTASSIUM PHOSPHATE: .53; .5; .37; .037; .03; .012; .00082 INJECTION, SOLUTION INTRAVENOUS at 03:07

## 2020-07-21 RX ADMIN — CALCIUM CARBONATE (ANTACID) CHEW TAB 500 MG 500 MG: 500 CHEW TAB at 04:07

## 2020-07-21 RX ADMIN — ACETAMINOPHEN 1000 MG: 10 INJECTION, SOLUTION INTRAVENOUS at 05:07

## 2020-07-21 RX ADMIN — SUGAMMADEX 400 MG: 100 INJECTION, SOLUTION INTRAVENOUS at 06:07

## 2020-07-21 RX ADMIN — PHENYLEPHRINE HYDROCHLORIDE 200 MCG: 10 INJECTION INTRAVENOUS at 03:07

## 2020-07-21 RX ADMIN — FENTANYL CITRATE 50 MCG: 50 INJECTION, SOLUTION INTRAMUSCULAR; INTRAVENOUS at 03:07

## 2020-07-21 RX ADMIN — ROCURONIUM BROMIDE 30 MG: 10 INJECTION, SOLUTION INTRAVENOUS at 04:07

## 2020-07-21 RX ADMIN — ONDANSETRON 4 MG: 2 INJECTION, SOLUTION INTRAMUSCULAR; INTRAVENOUS at 05:07

## 2020-07-21 RX ADMIN — SUCCINYLCHOLINE CHLORIDE 180 MG: 20 INJECTION, SOLUTION INTRAMUSCULAR; INTRAVENOUS at 03:07

## 2020-07-21 RX ADMIN — HYDROMORPHONE HYDROCHLORIDE 0.2 MG: 1 INJECTION, SOLUTION INTRAMUSCULAR; INTRAVENOUS; SUBCUTANEOUS at 07:07

## 2020-07-21 RX ADMIN — DOCUSATE SODIUM 50MG AND SENNOSIDES 8.6MG 1 TABLET: 8.6; 5 TABLET, FILM COATED ORAL at 04:07

## 2020-07-21 RX ADMIN — METOPROLOL SUCCINATE 50 MG: 50 TABLET, EXTENDED RELEASE ORAL at 09:07

## 2020-07-21 RX ADMIN — HEPARIN SODIUM 10000 UNITS: 1000 INJECTION, SOLUTION INTRAVENOUS; SUBCUTANEOUS at 04:07

## 2020-07-21 RX ADMIN — LISINOPRIL 40 MG: 20 TABLET ORAL at 12:07

## 2020-07-21 RX ADMIN — OXYCODONE HYDROCHLORIDE 5 MG: 5 TABLET ORAL at 01:07

## 2020-07-21 RX ADMIN — FENTANYL CITRATE 25 MCG: 50 INJECTION, SOLUTION INTRAMUSCULAR; INTRAVENOUS at 04:07

## 2020-07-21 RX ADMIN — CALCIUM CHLORIDE 1 G: 100 INJECTION, SOLUTION INTRAVENOUS at 04:07

## 2020-07-21 RX ADMIN — PANTOPRAZOLE SODIUM 40 MG: 40 TABLET, DELAYED RELEASE ORAL at 12:07

## 2020-07-21 RX ADMIN — HYDROMORPHONE HYDROCHLORIDE 1 MG: 1 INJECTION, SOLUTION INTRAMUSCULAR; INTRAVENOUS; SUBCUTANEOUS at 02:07

## 2020-07-21 RX ADMIN — DEXMEDETOMIDINE HYDROCHLORIDE 40 MCG: 100 INJECTION, SOLUTION, CONCENTRATE INTRAVENOUS at 05:07

## 2020-07-21 RX ADMIN — LIDOCAINE HYDROCHLORIDE 80 MG: 20 INJECTION, SOLUTION INTRAVENOUS at 03:07

## 2020-07-21 RX ADMIN — TRAZODONE HYDROCHLORIDE 100 MG: 100 TABLET ORAL at 09:07

## 2020-07-21 NOTE — PROGRESS NOTES
Suture removal time 1840 per orders. Per SRN suture removal time delayed due to change of shift occurring at that time.     1940: RN at bedside to remove suture. R groin site WDL suture removed manual pressure held. No bleeding/hematoma noted, +2 pulses distally. Pt complained of pain at left groin site, pt stated it started to occur 15 min before RN came to remove sutures. No bleeding noted to site, on papaltation small hematoma noted to upper inner thigh, +2 pulses distally, site warm to touch. Pressure held to left groin site. EP paged at 1948, DAVID Griffin MD returned page stating to page in house Cardio. Cardiology paged  and made aware of hematoma to left groin and stated they (Ally THOMAS) would come to bedside.     2015 Cardiology paged again to come to bedside for growing hematoma.     2025 Moisés THOMAS and Ally THOMAS at bedside to assess groin. Carin THOMAS at bedside to assess groin site. MDs held pressure ~30 minutes. STAT CBC and INR drawn. STAT ultrasound of groin ordered. MD instructed RN to hold manual pressure for an additional 30 minutes. 2130 manual pressure completed.     2200 Pt groin site reassessed and noted to be enlarging. Ally THOMAS made aware and at bedside.      ~0000 Ultrasound at bedside. After completed hematoma noted to be larger. Orders to remove sutures at Left groin site, no bleeding noted, manual pressure held for 30 minutes. Sand bag placed to site. Vascular surgery consulted.     ~0120 Vascular MDs at bedside.   0140 Case request for exploratory surgery.  0250 pt left floor for surgery.

## 2020-07-21 NOTE — PLAN OF CARE
Patient has 2 Hemovadc placed to her left thigh (left anterior and left ventral). Per MD drain tubing needs to be stripped every couple of hours to prevent stagnant blood from clotting in the tubing Patient also has a branch in place. Patient receiving oxy 5mg PRN q4hrs. Next available dose will be at 2227. Patient has gotten up today and stood on the side of the bed and got into the chair for alittle bit.

## 2020-07-21 NOTE — HPI
54 y/o F with a pmh of  MVP, HTN, depression, hx opioid dependenc and recent diagnosis of atrial fibrillation. She underwent an ablation today with Cardiology. Post procedure she was noted to have a hematoma in her left groin. At this time pressure was held and an ultrasound was obtained. Despite holding pressure her hematoma continued to expand. U/s did not show active flow into the hematoma or evidence of a pseudoaneurysm. Of note patient is on Eliquis at home and per Cardiology instructions took a dose the night prior to the procedure and held the AM of the procedure.

## 2020-07-21 NOTE — ANESTHESIA PROCEDURE NOTES
Arterial    Diagnosis: Left Femoral Artery Hematoma    Patient location during procedure: done in OR  Procedure start time: 7/21/2020 4:03 AM  Timeout: 7/21/2020 4:02 AM  Procedure end time: 7/21/2020 4:04 AM    Staffing  Authorizing Provider: Dre Rankin MD  Performing Provider: Dre Rankin MD    Anesthesiologist was present at the time of the procedure.    Preanesthetic Checklist  Completed: patient identified, site marked, surgical consent, pre-op evaluation, timeout performed, IV checked, risks and benefits discussed, monitors and equipment checked and anesthesia consent givenArterial  Skin Prep: chlorhexidine gluconate  Local Infiltration: none  Orientation: left  Location: radial  Catheter Size: 20 GInsertion Attempts: 1  Assessment  Dressing: secured with tape and tegaderm  Patient: Tolerated well

## 2020-07-21 NOTE — PLAN OF CARE
07/21/20 0853   Discharge Assessment   Assessment Type Discharge Planning Assessment   Assessment information obtained from? Medical Record   Expected Length of Stay (days) 2   Prior to hospitilization cognitive status: Alert/Oriented   Prior to hospitalization functional status: Independent   Current cognitive status: Alert/Oriented   Current Functional Status: Independent   Lives With alone   Able to Return to Prior Arrangements yes   Is patient able to care for self after discharge? Yes   Patient's perception of discharge disposition home or selfcare   Readmission Within the Last 30 Days no previous admission in last 30 days   Patient currently being followed by outpatient case management? No   Patient currently receives any other outside agency services? No   Equipment Currently Used at Home none   Do you have any problems affording any of your prescribed medications? No   Is the patient taking medications as prescribed? yes   Does the patient have transportation home? Yes   Transportation Anticipated family or friend will provide   Discharge Plan A Home   DME Needed Upon Discharge  none   Admitted with A-Fib post RFA with hematoma complication requiring surgical intervention. Known to this CM from previous admit. 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 Plan is to DC home. No DC needs identified.

## 2020-07-21 NOTE — PLAN OF CARE
Brief update note:    Called for concern of hematoma by RN at 20:00.    Vitals:    07/20/20 1716   BP: (!) 115/55   Pulse: 91   Resp: 20   Temp: 98.3 °F (36.8 °C)     Px  Constitutional: No distress, obese, conversant  HEENT: Sclera anicteric, PERRLA, EOMI  Neck: No JVD, no masses, good movement  CV: RRR, S1 and S2 normal, no additional heart sounds or murmurs. Pulses 2+ DP and PTs bilaterally. Warm extremities.  No bruit or palpable pulsations. Well demarcted 8cm immorbile hard hematoma palpated on left inguinal area.     Plan:   -EP fellow notified   -pressure held manually for 60 minutes  -CBC b6anvup ( now, midnight and 8am)  -U/S with dopplers stat  -immobilization  -recheck at 22:00    Interval Update: rechecked patient   PE: 2 + left DP , unable to palpate PT + doppler. Cool foot on exam with slightly increased hematoma size with increased pain.   Hg 13> 11  -US prelim read showing 12X6cm hematoma adjacent to CFA, no evidence of pseudoaneurysm   -EP fellow notified   -dwain kim'ed  -vascular surgery, plan for OR urgently

## 2020-07-21 NOTE — SUBJECTIVE & OBJECTIVE
Past Medical History:   Diagnosis Date    Atrial fibrillation     cardioversion    Avascular necrosis     L hand    Depression     GERD (gastroesophageal reflux disease)     Hx of psychiatric care     Hypertension     Psychiatric problem        Past Surgical History:   Procedure Laterality Date    CARPAL TUNNEL RELEASE Right 6/10/2020    Procedure: RELEASE, CARPAL TUNNEL - RIGHT;  Surgeon: Adelaida Hall MD;  Location: [facility] OR;  Service: Orthopedics;  Laterality: Right;  GENERAL AND REGIONAL    FOOT SURGERY      TREATMENT OF CARDIAC ARRHYTHMIA N/A 1/29/2020    Procedure: CARDIOVERSION;  Surgeon: Gabriel Hawley MD;  Location: [facility] EP LAB;  Service: Cardiology;  Laterality: N/A;  af, demi, dccv, anes, mb, 345       Review of patient's allergies indicates:  No Known Allergies    Current Facility-Administered Medications on File Prior to Encounter   Medication    0.9%  NaCl infusion     Current Outpatient Medications on File Prior to Encounter   Medication Sig    apixaban (ELIQUIS) 5 mg Tab Take 1 tablet (5 mg total) by mouth 2 (two) times daily.    b complex vitamins capsule Take 1 capsule by mouth once daily.    gluc-shikha-[facility] [place] [place]#5-Z-odtm-reymundo-bor 750 mg-644 mg- 30 mg-1 mg Tab Take 1 tablet by mouth once daily.     hydroCHLOROthiazide (HYDRODIURIL) 50 MG tablet Take 1 tablet (50 mg total) by mouth once daily.    krill/om-3/dha/epa/phospho/ast (MEGARED OMEGA-3 KRILL OIL ORAL)     lisinopriL (PRINIVIL,ZESTRIL) 40 MG tablet Take 1 tablet (40 mg total) by mouth once daily.    MELATONIN ORAL Take 1-2 tablets by mouth nightly as needed (Sleep).    multivitamin (THERAGRAN) per tablet Take 1 tablet by mouth once daily.    omeprazole (PRILOSEC) 20 MG capsule TAKE 1 CAPSULE BY MOUTH ONCE DAILY     Family History     Problem Relation (Age of Onset)    Cataracts Mother    Diabetes Father    Hypertension Mother, Father, Sister, Brother    Thyroid disease Mother        Tobacco Use    Smoking status:  Former Smoker     Types: Cigarettes     Quit date: 2019     Years since quittin.0    Smokeless tobacco: Never Used   Substance and Sexual Activity    Alcohol use: No    Drug use: No    Sexual activity: Not on file     Review of Systems   Constitution: Positive for malaise/fatigue. Negative for decreased appetite, fever and weight gain.   HENT: Negative for congestion, hearing loss and nosebleeds.    Eyes: Negative for visual disturbance.   Cardiovascular: Positive for dyspnea on exertion and leg swelling. Negative for chest pain, irregular heartbeat, palpitations and syncope.   Respiratory: Negative for cough, shortness of breath and sleep disturbances due to breathing.    Endocrine: Negative for cold intolerance and heat intolerance.   Hematologic/Lymphatic: Negative for bleeding problem. Does not bruise/bleed easily.   Gastrointestinal: Negative for bloating, abdominal pain, change in bowel habit and heartburn.   Neurological: Negative for dizziness, loss of balance and numbness.   Psychiatric/Behavioral: Negative for altered mental status. The patient is not nervous/anxious.      Objective:     Vital Signs (Most Recent):  Temp: 98.8 °F (37.1 °C) (20 1213)  Pulse: 87 (20 1504)  Resp: 16 (20 1344)  BP: (!) 104/59 (20 1213)  SpO2: (!) 90 % (20 1213) Vital Signs (24h Range):  Temp:  [97.2 °F (36.2 °C)-98.8 °F (37.1 °C)] 98.8 °F (37.1 °C)  Pulse:  [65-91] 87  Resp:  [12-29] 16  SpO2:  [90 %-100 %] 90 %  BP: ()/(51-77) 104/59  Arterial Line BP: (155-171)/(78-85) 157/78     Weight: 131.5 kg (290 lb)  Body mass index is 46.81 kg/m².    SpO2: (!) 90 %  O2 Device (Oxygen Therapy): room air      Intake/Output Summary (Last 24 hours) at 2020 1646  Last data filed at 2020 1450  Gross per 24 hour   Intake 4897 ml   Output 1350 ml   Net 3547 ml       Lines/Drains/Airways     Central Venous Catheter Line            Percutaneous Central Line Insertion/Assessment - Triple  Lumen  07/21/20 0439 less than 1 day          Drain                 Urethral Catheter 07/20/20 1140 Non-latex 16 Fr. 1 day          Peripheral Intravenous Line                 Peripheral IV - Single Lumen 20 G Right Hand -- days         Peripheral IV - Single Lumen 07/20/20 1016 18 G Left Antecubital 1 day                Physical Exam   Constitutional: She is oriented to person, place, and time. Vital signs are normal. She appears well-developed and well-nourished.   HENT:   Head: Normocephalic.   Eyes: Pupils are equal, round, and reactive to light.   Neck: Normal range of motion. Neck supple. No JVD present. Carotid bruit is not present.   Cardiovascular: Normal rate, regular rhythm, S1 normal, S2 normal, normal heart sounds and intact distal pulses. PMI is not displaced. Exam reveals no gallop and no friction rub.   No murmur heard.  Pulses:       Radial pulses are 2+ on the right side and 2+ on the left side.        Dorsalis pedis pulses are 2+ on the right side and 2+ on the left side.   Pulmonary/Chest: Effort normal and breath sounds normal. No accessory muscle usage. She has no decreased breath sounds. She has no wheezes.   Abdominal: Soft. Normal appearance and bowel sounds are normal. She exhibits no distension, no fluid wave and no ascites. There is no hepatosplenomegaly. There is no abdominal tenderness.   Musculoskeletal: Normal range of motion.         General: No edema.      Comments: Left groin with inferior medial and lateral medial MUSHTAQ drains with serosanguinous output. Wound vac placed superior left groin. Left PT and and DP pulses 2+   Neurological: She is alert and oriented to person, place, and time. She has normal strength.   Skin: Skin is warm, dry and intact. No ecchymosis and no rash noted. No erythema.   Psychiatric: She has a normal mood and affect. Her speech is normal and behavior is normal. Judgment and thought content normal. Cognition and memory are normal.   Vitals  reviewed.      Significant Labs:   CMP   Recent Labs   Lab 07/21/20  0659   *   K 4.2      CO2 25   *   BUN 19   CREATININE 0.9   CALCIUM 7.7*   ANIONGAP 8   ESTGFRAFRICA >60.0   EGFRNONAA >60.0   , CBC   Recent Labs   Lab 07/21/20  0217  07/21/20  0659 07/21/20  1616   WBC 15.10*  --  17.97* 15.80*   HGB 10.2*  --  9.8* 9.2*   HCT 31.0*   < > 30.2* 27.9*     --  165 193    < > = values in this interval not displayed.   , INR   Recent Labs   Lab 07/20/20 2054   INR 1.0    and All pertinent lab results from the last 24 hours have been reviewed.

## 2020-07-21 NOTE — PLAN OF CARE
Pt encouraged to keep legs still and not raise head, okay to move arms. Vs stable, states pain is improved but slightly anxious. Sister called and updated and alerted to room assignment. Vs stable

## 2020-07-21 NOTE — PLAN OF CARE
EP POC Brief Note    Ms. Alvarado is s/p RFA PVI with Dr. Hawley on 7/20/20. Procedure complicated by large left SFA hematoma. Vascular surgery consulted and on 7/21/20 patient underwent left SFA exploration with direct repair of L SFA branch laceration.    Left groin site with wound vac and MUSHTAQ drain with minimal sanguinous drainage. Groin site care and dressing management per vascular surgery.  Right groin soft without evidence of hematoma. BLE warm with 2+ pedal pulses.     Will continue to hold eliquis. Resume OAC per vascular surgery recommendations. Hgb stable 9.8.   Review of tele with SR and occasional PVCs.   Due to vascular complication Ms. Alvarado will remain IP for the next 2-4 days.  CCU service will serve as primary team with EP and vascular surgery consults.     Laurie BUTTERFIELD, MARY  07/21/2020  4:20 PM

## 2020-07-21 NOTE — ANESTHESIA POSTPROCEDURE EVALUATION
Anesthesia Post Evaluation    Patient: Vicky Alvarado    Procedure(s) Performed: Procedure(s) (LRB):  Ablation atrial fibrillation (N/A)    Final Anesthesia Type: general    Patient location during evaluation: PACU  Patient participation: Yes- Able to Participate  Level of consciousness: awake and alert  Post-procedure vital signs: reviewed and stable  Pain management: adequate  Airway patency: patent    PONV status at discharge: No PONV  Anesthetic complications: no      Cardiovascular status: hemodynamically stable  Respiratory status: unassisted  Hydration status: euvolemic  Follow-up not needed.          Vitals Value Taken Time   /68 07/21/20 0647   Temp 36.5 °C (97.7 °F) 07/21/20 0630   Pulse 86 07/21/20 0658   Resp 14 07/21/20 0658   SpO2 99 % 07/21/20 0658   Vitals shown include unvalidated device data.      No case tracking events are documented in the log.      Pain/Tolu Score: Pain Rating Prior to Med Admin: 8 (7/21/2020  6:58 AM)

## 2020-07-21 NOTE — ASSESSMENT & PLAN NOTE
Currently euvolemic    - continue home HCTZ 50 qd, lisinopril 40mg, and toprol 50mg BID    TTE on 5/2020  · Normal left ventricular systolic function. The estimated ejection fraction is 60%.  · Severe left atrial enlargement.  · Grade I (mild) left ventricular diastolic dysfunction consistent with impaired relaxation.  · Normal right ventricular systolic function.  · Mild right atrial enlargement.  · Mild aortic valve stenosis. Aortic valve area is 1.83 cm2; peak velocity is 2.42 m/s; mean gradient is 12 mmHg.  · Mild tricuspid regurgitation.  · Normal central venous pressure (3 mmHg).  · The estimated PA systolic pressure is 22 mmHg.

## 2020-07-21 NOTE — PROGRESS NOTES
Sister Zully called and updated on condition.  Medicated for pain in right arm, also repositioned on blanket

## 2020-07-21 NOTE — ANESTHESIA PROCEDURE NOTES
Intubation  Performed by: Rajesh Tabares CRNA  Authorized by: Dre Rankin MD     Intubation:     Induction:  Intravenous    Intubated:  Postinduction    Mask Ventilation:  Easy mask    Attempts:  1    Attempted By:  CRNA    Method of Intubation:  Direct    Blade:  Ng 2    Laryngeal View Grade: Grade I - full view of chords      Difficult Airway Encountered?: No      Complications:  None    Airway Device:  Oral endotracheal tube    Airway Device Size:  7.5    Style/Cuff Inflation:  Cuffed    Inflation Amount (mL):  8    Tube secured:  21    Secured at:  The lips    Placement Verified By:  Capnometry    Complicating Factors:  None    Findings Post-Intubation:  BS equal bilateral

## 2020-07-21 NOTE — CARE UPDATE
"RAPID RESPONSE NURSE PROACTIVE ROUNDING NOTE     Time of Visit: 0125    Admit Date: 2020  LOS: 1  Code Status: Prior   Date of Visit: 2020  : 1965  Age: 55 y.o.  Sex: female  Race: White  Bed: 306/306 A:   MRN: [id number]  Was the patient discharged from an ICU this admission? no   Was the patient discharged from a PACU within last 24 hours?  no  Did the patient receive conscious sedation/general anesthesia in last 24 hours?  yes  Was the patient in the ED within the past 24 hours?  no  Was the patient started on NIPPV within the past 24 hours?  no  Attending Physician: Gabriel Hawley MD  Primary Service: Networked reference to record PCT     ASSESSMENT     Notified by charge RN via phone call.  Reason for alert: Hematoma    Diagnosis: Atrial fibrillation    Abnormal Vital Signs: /73 (BP Location: Right arm, Patient Position: Lying)   Pulse 73   Temp 98.4 °F (36.9 °C) (Oral)   Resp 18   Ht 5' 6" (1.676 m)   Wt 131.5 kg (290 lb)   LMP 2015   SpO2 (!) 92%   Breastfeeding No   BMI 46.81 kg/m²      Clinical Issues: Circulatory    Patient  has a past medical history of Atrial fibrillation, Avascular necrosis, Depression, GERD (gastroesophageal reflux disease), psychiatric care, Hypertension, and Psychiatric problem.      Upon assessment, patient with large hematoma to L groin site. Pulses are palpable and BP is stable with SBP in the 130's. Complaints of severe pain to site, sandbag in place. Primary held pressure for 60 minutes and consulted vascular surgery. Patient to go to surgery for a washout.      INTERVENTIONS/ RECOMMENDATIONS     Consult vascular, surgery for washout    Discussed plan of care with RNLaurie.    PHYSICIAN ESCALATION     Yes/No  yes    Orders received and case discussed with Dr. Canales.    Disposition: Remain in room 306.    FOLLOW-UP     Call back the Rapid Response Nurse, [place] [phone] for additional questions or concerns.          "

## 2020-07-21 NOTE — PROGRESS NOTES
Brief update note:    Called for concern of hematoma by RN at 20:00.    Vitals:    07/20/20 1716   BP: (!) 115/55   Pulse: 91   Resp: 20   Temp: 98.3 °F (36.8 °C)     Px  Constitutional: No distress, obese, conversant  HEENT: Sclera anicteric, PERRLA, EOMI  Neck: No JVD, no masses, good movement  CV: RRR, S1 and S2 normal, no additional heart sounds or murmurs. Pulses 2+ DP and PTs bilaterally. Warm extremities.  No bruit or palpable pulsations. Well demarcted 8cm immorbile hard hematoma palpated on left inguinal area.     Plan:   -EP fellow notified   -CBC [id number] ( now, midnight and 8am)  -U/S with dopplers stat  -immobilization  -recheck at 22:00

## 2020-07-21 NOTE — SUBJECTIVE & OBJECTIVE
Medications Prior to Admission   Medication Sig Dispense Refill Last Dose    apixaban (ELIQUIS) 5 mg Tab Take 1 tablet (5 mg total) by mouth 2 (two) times daily. 60 tablet 6 7/19/2020 at 2100    b complex vitamins capsule Take 1 capsule by mouth once daily.   7/19/2020 at 0800    DULoxetine (CYMBALTA) 60 MG capsule Take 2 capsules (120 mg total) by mouth once daily. 180 capsule 3 7/20/2020 at 0600    gluc-shikha-[facility] [place]#5-C-vtwv-reymundo-bor 750 mg-644 mg- 30 mg-1 mg Tab Take 1 tablet by mouth once daily.    7/19/2020 at 0600    hydroCHLOROthiazide (HYDRODIURIL) 50 MG tablet Take 1 tablet (50 mg total) by mouth once daily. 90 tablet 5 7/19/2020 at 0600    krill/om-3/dha/epa/phospho/ast (MEGARED OMEGA-3 KRILL OIL ORAL)    7/19/2020 at 0600    lisinopriL (PRINIVIL,ZESTRIL) 40 MG tablet Take 1 tablet (40 mg total) by mouth once daily. 90 tablet 5 7/19/2020 at 0600    MELATONIN ORAL Take 1-2 tablets by mouth nightly as needed (Sleep).   7/18/2020 at 2100    metoprolol succinate (TOPROL-XL) 50 MG 24 hr tablet Take 1 tablet (50 mg total) by mouth 2 (two) times daily. 90 tablet 6 7/20/2020 at 0600    multivitamin (THERAGRAN) per tablet Take 1 tablet by mouth once daily.   7/19/2020 at 0600    omeprazole (PRILOSEC) 20 MG capsule TAKE 1 CAPSULE BY MOUTH ONCE DAILY 90 capsule 3 7/20/2020 at 0600    traZODone (DESYREL) 100 MG tablet Take 1 tablet (100 mg total) by mouth nightly as needed for Insomnia. 90 tablet 3 7/19/2020 at 2100       Review of patient's allergies indicates:  No Known Allergies    Past Medical History:   Diagnosis Date    Atrial fibrillation     cardioversion    Avascular necrosis     L hand    Depression     GERD (gastroesophageal reflux disease)     Hx of psychiatric care     Hypertension     Psychiatric problem      Past Surgical History:   Procedure Laterality Date    CARPAL TUNNEL RELEASE Right 6/10/2020    Procedure: RELEASE, CARPAL TUNNEL - RIGHT;  Surgeon: Adelaida Hall MD;   Location: [facility] OR;  Service: Orthopedics;  Laterality: Right;  GENERAL AND REGIONAL    FOOT SURGERY      TREATMENT OF CARDIAC ARRHYTHMIA N/A 2020    Procedure: CARDIOVERSION;  Surgeon: Gabriel Hawley MD;  Location: [facility] EP LAB;  Service: Cardiology;  Laterality: N/A;  af, demi, dccv, anes, mb, 345     Family History     Problem Relation (Age of Onset)    Cataracts Mother    Diabetes Father    Hypertension Mother, Father, Sister, Brother    Thyroid disease Mother        Tobacco Use    Smoking status: Former Smoker     Types: Cigarettes     Quit date: 2019     Years since quittin.0    Smokeless tobacco: Never Used   Substance and Sexual Activity    Alcohol use: No    Drug use: No    Sexual activity: Not on file     Review of Systems   Constitutional: Positive for diaphoresis. Negative for activity change and appetite change.   Respiratory: Negative for cough and shortness of breath.    Cardiovascular: Positive for leg swelling (hematoma). Negative for chest pain.   Gastrointestinal: Negative for abdominal distention and abdominal pain.   Skin: Negative for color change.   Neurological: Negative for numbness and headaches.   Psychiatric/Behavioral: Negative for agitation. The patient is nervous/anxious.      Objective:     Vital Signs (Most Recent):  Temp: 98.4 °F (36.9 °C) (20 2327)  Pulse: 73 (20 2327)  Resp: 18 (20 0014)  BP: 129/73 (20 0022)  SpO2: (!) 92 % (20) Vital Signs (24h Range):  Temp:  [97.5 °F (36.4 °C)-98.8 °F (37.1 °C)] 98.4 °F (36.9 °C)  Pulse:  [65-91] 73  Resp:  [9-24] 18  SpO2:  [92 %-97 %] 92 %  BP: (102-129)/(55-73) 129/73     Weight: 131.5 kg (290 lb)  Body mass index is 46.81 kg/m².    Physical Exam  Vitals signs and nursing note reviewed.   Constitutional:       Comments: Morbidly obese   HENT:      Head: Normocephalic and atraumatic.   Eyes:      Extraocular Movements: Extraocular movements intact.   Cardiovascular:      Rate and  Rhythm: Normal rate and regular rhythm.   Abdominal:      General: There is no distension.      Palpations: Abdomen is soft.      Tenderness: There is no abdominal tenderness.   Musculoskeletal: Normal range of motion.      Comments: Large (25cm hematoma) over her left groin  Tender to palpation  R: 2+DP/PT  L: 2+ DP/ Triphasic PT   Sensation intact BLE  Motor intact BLE   Neurological:      General: No focal deficit present.      Mental Status: She is alert.         Significant Labs:  Reviewed    Significant Diagnostics:  Reviewed

## 2020-07-21 NOTE — ANESTHESIA PREPROCEDURE EVALUATION
"[facility]  Anesthesia Pre-Operative Evaluation         Patient Name: Vicky Alvarado  YOB: 1965  MRN: [id number]    SUBJECTIVE:     Pre-operative evaluation for Procedure(s) (LRB):  EXPLORATION, ARTERY, FEMORAL (Left)     07/21/2020    Vicky Alvarado is a 55 y.o. female w/ a significant PMHx of CHF, Afib on Eliquis, Obesity, MVP, Mild AS who was admitted today for ablation of atrial fibrillation.   Procedure was successful and she tolerated anesthesia w/o any complications.     Hospital course complicated by left groin hematoma at access site w/o compromise to limb vascular flow.   Now for femoral exploration by Vascular Surgery.     **NPO x <6 hours (liquids)**    Patient now presents for the above procedure(s).      LDA:        Peripheral IV - Single Lumen 07/20/20 1015 20 G Right Forearm (Active)   Site Assessment Clean;Dry;Intact 07/20/20 1716   Line Status Saline locked 07/20/20 1716   Dressing Status Clean;Dry;Intact 07/20/20 1716   Number of days: 0            Peripheral IV - Single Lumen 07/20/20 1016 18 G Left Antecubital (Active)   Site Assessment Clean;Dry;Intact 07/20/20 1716   Line Status Saline locked 07/20/20 1716   Dressing Status Clean;Dry;Intact 07/20/20 1716   Number of days: 0            Urethral Catheter 07/20/20 1140 Non-latex 16 Fr. (Active)   Site Assessment Clean;Intact 07/20/20 1716   Collection Container Urimeter 07/20/20 1716   Securement Method secured to top of thigh w/ adhesive device 07/20/20 1716   Reason for Continuing Urinary Catheterization Post operative 07/20/20 1716   CAUTI Prevention Bundle StatLock in place w 1" slack;Intact seal between catheter & drainage tubing;Drainage bag/urimeter off the floor;Green sheeting clip in use;No dependent loops or kinks;Drainage bag/urimeter not overfilled (<2/3 full);Drainage bag/urimeter below bladder 07/20/20 1716   Output (mL) 500 mL 07/21/20 0000   Number of days: 0       Prev airway: DL " Hernandez 2, ETT 7.    Drips: None documented      Patient Active Problem List   Diagnosis    Opiate dependence    Opioid dependence in remission    Hypertension    Chronic systolic heart failure    Atrial Fibrillation (paroxysmal)    Cardiomyopathy    Avascular necrosis of lunate    Body mass index (BMI) 45.0-49.9, adult    History of opioid abuse    Severe obesity (BMI >= 40)    Pre-diabetes    Right carpal tunnel syndrome    Weakness of right hand    ERENDIRA (obstructive sleep apnea)    Atrial fibrillation       Review of patient's allergies indicates:  No Known Allergies    Current Inpatient Medications:   DULoxetine  120 mg Oral Daily    hydroCHLOROthiazide  50 mg Oral Daily    HYDROmorphone  1 mg Intravenous Once    lisinopriL  40 mg Oral Daily    metoprolol succinate  50 mg Oral BID    pantoprazole  40 mg Oral Daily       Current Facility-Administered Medications on File Prior to Encounter   Medication Dose Route Frequency Provider Last Rate Last Dose    0.9%  NaCl infusion   Intravenous Continuous Derrick Bandar Dior MD   Stopped at 07/20/20 1451     Current Outpatient Medications on File Prior to Encounter   Medication Sig Dispense Refill    apixaban (ELIQUIS) 5 mg Tab Take 1 tablet (5 mg total) by mouth 2 (two) times daily. 60 tablet 6    b complex vitamins capsule Take 1 capsule by mouth once daily.      gluc-shikha-[id number]-bor 750 mg-644 mg- 30 mg-1 mg Tab Take 1 tablet by mouth once daily.       hydroCHLOROthiazide (HYDRODIURIL) 50 MG tablet Take 1 tablet (50 mg total) by mouth once daily. 90 tablet 5    krill/om-3/dha/epa/phospho/ast (MEGARED OMEGA-3 KRILL OIL ORAL)       lisinopriL (PRINIVIL,ZESTRIL) 40 MG tablet Take 1 tablet (40 mg total) by mouth once daily. 90 tablet 5    MELATONIN ORAL Take 1-2 tablets by mouth nightly as needed (Sleep).      multivitamin (THERAGRAN) per tablet Take 1 tablet by mouth once daily.      omeprazole (PRILOSEC) 20 MG capsule TAKE 1  CAPSULE BY MOUTH ONCE DAILY 90 capsule 3       Past Surgical History:   Procedure Laterality Date    CARPAL TUNNEL RELEASE Right 6/10/2020    Procedure: RELEASE, CARPAL TUNNEL - RIGHT;  Surgeon: Adelaida Hall MD;  Location: [facility] OR;  Service: Orthopedics;  Laterality: Right;  GENERAL AND REGIONAL    FOOT SURGERY      TREATMENT OF CARDIAC ARRHYTHMIA N/A 2020    Procedure: CARDIOVERSION;  Surgeon: Gabriel Hawley MD;  Location: [facility] EP LAB;  Service: Cardiology;  Laterality: N/A;  af, demi, dccv, anes, mb, 345       Social History     Socioeconomic History    Marital status: Single     Spouse name: Not on file    Number of children: Not on file    Years of education: Not on file    Highest education level: Not on file   Occupational History    Not on file   Social Needs    Financial resource strain: Not on file    Food insecurity     Worry: Not on file     Inability: Not on file    Transportation needs     Medical: Not on file     Non-medical: Not on file   Tobacco Use    Smoking status: Former Smoker     Types: Cigarettes     Quit date: 2019     Years since quittin.0    Smokeless tobacco: Never Used   Substance and Sexual Activity    Alcohol use: No    Drug use: No    Sexual activity: Not on file   Lifestyle    Physical activity     Days per week: Not on file     Minutes per session: Not on file    Stress: Not on file   Relationships    Social connections     Talks on phone: Not on file     Gets together: Not on file     Attends Cheondo[employer] service: Not on file     Active member of club or organization: Not on file     Attends meetings of clubs or organizations: Not on file     Relationship status: Not on file   Other Topics Concern    Patient feels they ought to cut down on drinking/drug use Not Asked    Patient annoyed by others criticizing their drinking/drug use Not Asked    Patient has felt bad or guilty about drinking/drug use Not Asked    Patient has had a  drink/used drugs as an eye opener in the AM Not Asked   Social History Narrative    Not on file       OBJECTIVE:     Vital Signs Range (Last 24H):  Temp:  [36.4 °C (97.5 °F)-37.1 °C (98.8 °F)]   Pulse:  [65-91]   Resp:  [9-24]   BP: (102-129)/(55-73)   SpO2:  [92 %-97 %]       Significant Labs:  Lab Results   Component Value Date    WBC 11.74 07/20/2020    WBC 11.74 07/20/2020    HGB 11.0 (L) 07/20/2020    HGB 11.0 (L) 07/20/2020    HCT 35.3 (L) 07/20/2020    HCT 35.3 (L) 07/20/2020     07/20/2020     07/20/2020    CHOL 209 (H) 02/21/2020    TRIG 191 (H) 02/21/2020    HDL 43 02/21/2020    ALT 27 02/21/2020    AST 43 (H) 02/21/2020     07/07/2020    K 4.5 07/07/2020     07/07/2020    CREATININE 0.8 07/07/2020    BUN 17 07/07/2020    CO2 28 07/07/2020    TSH 1.225 01/28/2020    INR 1.0 07/20/2020    HGBA1C 7.1 (H) 02/21/2020       Diagnostic Studies: No relevant studies.    EKG:   Results for orders placed or performed during the hospital encounter of 07/20/20   EKG 12-lead    Collection Time: 07/20/20  3:40 PM    Narrative    Test Reason : I48.91,    Vent. Rate : 072 BPM     Atrial Rate : 072 BPM     P-R Int : 144 ms          QRS Dur : 090 ms      QT Int : 436 ms       P-R-T Axes : 071 051 062 degrees     QTc Int : 477 ms    Normal sinus rhythm  Normal ECG  When compared with ECG of 20-JUL-2020 09:41,  No significant change was found    Referred By: MADAY RUIZ           Confirmed By:        2D ECHO:  TTE:  Results for orders placed or performed during the hospital encounter of 05/05/20   Echo Color Flow Doppler? Yes   Result Value Ref Range    Ascending aorta 2.98 cm    STJ 2.21 cm    AV mean gradient 12 mmHg    Ao peak carlee 2.42 m/s    Ao VTI 47.90 cm    IVRT 114.18 msec    IVS 0.79 0.6 - 1.1 cm    LA size 4.63 cm    Left Atrium Major Axis 6.41 cm    Left Atrium Minor Axis 6.40 cm    LVIDD 4.77 3.5 - 6.0 cm    LVIDS 3.22 2.1 - 4.0 cm    LVOT diameter 2.01 cm    LVOT peak VTI 27.58 cm     PW 0.79 0.6 - 1.1 cm    MV Peak A Eulalio 0.87 m/s    E wave decelartion time 327.47 msec    MV Peak E Eulalio 0.66 m/s    PV Peak D Eulalio 0.40 m/s    PV Peak S Eulalio 0.68 m/s    RA Major Axis 4.63 cm    RA Width 2.85 cm    RVDD 3.88 cm    Sinus 2.58 cm    TAPSE 2.23 cm    TR Max Eulalio 2.16 m/s    TDI LATERAL 0.09 m/s    TDI SEPTAL 0.06 m/s    LA WIDTH 4.59 cm    LV Diastolic Volume 105.82 mL    LV Systolic Volume 41.71 mL    RV S' 11.67 cm/s    LVOT peak eulalio 1.29 m/s    LV LATERAL E/E' RATIO 7.33 m/s    LV SEPTAL E/E' RATIO 11.00 m/s    FS 32 %    LA volume 115.70 cm3    LV mass 123.33 g    Left Ventricle Relative Wall Thickness 0.33 cm    AV valve area 1.83 cm2    AV Velocity Ratio 0.53     AV index (prosthetic) 0.58     E/A ratio 0.76     Mean e' 0.08 m/s    Pulm vein S/D ratio 1.70     LVOT area 3.2 cm2    LVOT stroke volume 87.47 cm3    AV peak gradient 23 mmHg    E/E' ratio 8.80 m/s    Triscuspid Valve Regurgitation Peak Gradient 19 mmHg    BSA 2.47 m2    LV Systolic Volume Index 17.8 mL/m2    LV Diastolic Volume Index 45.18 mL/m2    LA Volume Index 49.4 mL/m2    LV Mass Index 53 g/m2    Right Atrial Pressure (from IVC) 3 mmHg    TV rest pulmonary artery pressure 22 mmHg    Narrative    · Normal left ventricular systolic function. The estimated ejection   fraction is 60%.  · Severe left atrial enlargement.  · Grade I (mild) left ventricular diastolic dysfunction consistent with   impaired relaxation.  · Normal right ventricular systolic function.  · Mild right atrial enlargement.  · Mild aortic valve stenosis. Aortic valve area is 1.83 cm2; peak velocity   is 2.42 m/s; mean gradient is 12 mmHg.  · Mild tricuspid regurgitation.  · Normal central venous pressure (3 mmHg).  · The estimated PA systolic pressure is 22 mmHg.          REENA:  No results found for this or any previous visit.    ASSESSMENT/PLAN:         Patient Active Problem List   Diagnosis    Opiate dependence    Opioid dependence in remission    Hypertension     Chronic systolic heart failure    Atrial Fibrillation (paroxysmal)    Cardiomyopathy    Avascular necrosis of lunate    Body mass index (BMI) 45.0-49.9, adult    History of opioid abuse    Severe obesity (BMI >= 40)    Pre-diabetes    Right carpal tunnel syndrome    Weakness of right hand    ERENDIRA (obstructive sleep apnea)    Atrial fibrillation         Pre-op Assessment    I have reviewed the Patient Summary Reports.     I have reviewed the Nursing Notes. I have reviewed the NPO Status.      Review of Systems  Anesthesia Hx:  No problems with previous Anesthesia  History of prior surgery of interest to airway management or planning: Denies Family Hx of Anesthesia complications.   Denies Personal Hx of Anesthesia complications.   Social:  Non-Smoker    Hematology/Oncology:  Hematology Normal   Oncology Normal     EENT/Dental:EENT/Dental Normal   Cardiovascular:   Hypertension    Pulmonary:  Pulmonary Normal    Renal/:  Renal/ Normal     Hepatic/GI:   GERD    Musculoskeletal:  Musculoskeletal Normal    Neurological:  Neurology Normal    Endocrine:  Endocrine Normal    Dermatological:  Skin Normal    Psych:  Psychiatric Normal           Physical Exam  General:  Well nourished, Morbid Obesity    Airway/Jaw/Neck:  Airway Findings: Mouth Opening: Normal Tongue: Normal  General Airway Assessment: Adult  Mallampati: II  Improves to II with phonation.  TM Distance: Normal, at least 6 cm  Jaw/Neck Findings:  Neck ROM: Normal ROM      Dental:  Dental Findings: Upper Dentures, Lower Dentures, Edentulous   Chest/Lungs:  Chest/Lungs Findings: Clear to auscultation     Heart/Vascular:  Heart Findings: Rate: Normal  Rhythm: Regular Rhythm  Sounds: Normal        Mental Status:  Mental Status Findings:  Cooperative, Alert and Oriented         Anesthesia Plan  Type of Anesthesia, risks & benefits discussed:  Anesthesia Type:  general  Patient's Preference: General  Intra-op Monitoring Plan: standard ASA monitors and  arterial line  Intra-op Monitoring Plan Comments: Standard ASA monitors.   Post Op Pain Control Plan: per primary service following discharge from PACU, multimodal analgesia and IV/PO Opioids PRN  Post Op Pain Control Plan Comments: Per primary service.     Induction:   IV  Beta Blocker:  Patient is on a Beta-Blocker and has received one dose within the past 24 hours (No further documentation required).       Informed Consent: Patient understands risks and agrees with Anesthesia plan.  Questions answered. Anesthesia consent signed with patient.  ASA Score: 3  emergent   Day of Surgery Review of History & Physical: I have interviewed and examined the patient. I have reviewed the patient's H&P dated:  There are no significant changes.  H&P update referred to the surgeon.     Anesthesia Plan Notes: Chart reviewed, patient interviewed and examined.  The plan for general anesthesia was explained.  Questions were answered and the consent was signed.  Debby WAY         Ready For Surgery From Anesthesia Perspective.

## 2020-07-21 NOTE — PROGRESS NOTES
On transfer to room, groins observed per RUDY Szymanski RN and Armida ATKINS, no drainage or swelling observed

## 2020-07-21 NOTE — ASSESSMENT & PLAN NOTE
S/p RFA PVI with Dr. Hawley on 7/20/20. Procedure complicated by large left SFA hematoma. Vascular surgery repaired on 7/21/20. Now with MUSHTAQ drains and wound vac.    - pulses intact   - CBC q8hr  - pain control with prn dilaudid and oxycodone  - vascular surgery and EP following

## 2020-07-21 NOTE — OP NOTE
DATE OF PROCEDURE: 07/21/2020    PREOPERATIVE DIAGNOSES:   1. Tense Left femoral hematoma (over 20 x20cm)  2. Morbid obesity     POSTOPERATIVE DIAGNOSES:   1. L SFA branch laceration  2. Morbid obesity    PROCEDURES PERFORMED:     1. Urgent direct repair of L SFA branch laceration    ATTENDING SURGEON: SEMAJ Márquez MD    HOUSESTAFF SURGEON: Rogelio Gillette MD    ANESTHESIA: General    ESTIMATED BLOOD LOSS: 100 mL new blood     FINDINGS: 4-5 units of blood evacuated from L thigh; small medial SFA branch artery bleeding    SPECIMEN: None    DRAINS: 19 F Alex x2 attached to accordion drains..     COMPLICATIONS: None.     INDICATIONS: Vicky Alvarado is a 55 y.o.female postop day 0 from atrial fibrillation ablation procedure, with progressive left femoral access site hematoma and severe pain.    OPERATIVE PROCEDURE: The patient was identified in preoperative holding and brought back to the operating room. Anesthesia was induced. A timeout procedure was performed and all team members present agreed this was the correct procedure on the correct patient. Perioperative antibiotics were administered. A 15 cm longitudinal incision made from the left inguinal ligament over the percutaneous access site and extending onto the hematoma. We divided the subcutaneous tissue with cautery down to the inguinal ligament, identified the proximal femoral artery and obtained proximal control with umbilical tape. During this dissection the greater saphenous vein was divided. This dissection was difficult due to the patients habitus. At this point we had anesthesia systemically heparinize the patient. After waiting 3 minutes, we clamped the femoral artery. We then allowed anesthesia to catch up with resuscitation. During this we intermittently released the clamp as tolerated. This included placing a large bore central line. She received 4 units of pRBC and 2 of FFP. Following this her blood pressure improved and we continued  with the procedure. The previous skin incision was carried down over the hematoma and the hematoma was evacuated. The hematoma contained 4-5 units of blood. Once the hematoma was removed a single medial side branch of the SFA was noted to be bleeding. It appeared to have been sheared. Both ends were tied off with silk ties. We then irrigated the wound bed and released the clamp on the proximal femoral artery. The was no evidence of other source of bleeding. 2 19F kathie drains were placed in the wound bed and exited laterally and medially. These were secured in place with nylon sutures and attached to accordion drains. Protamine was given. The wound was closed in multiple layers with running vicryl sutures. Skin was closed with 3-0 nylon horizontal mattress sutures and a provena wound vac was placed. All needle, instrument and sponge counts were correct at the completion of the procedure. The patient was extubated in the OR and transported to PACU with anesthesia. Dr. Márquez was scrubbed for the entire procedure.

## 2020-07-21 NOTE — HPI
55 year old female with history MVP, HTN, depression, hx opioid dependence and afib who was admitted to CCU for SFA laceration during PCI. S/p RFA PVI with Dr. Hawley on 7/20/20. Procedure complicated by large left SFA hematoma. Vascular surgery consulted and on 7/21/20 patient underwent left SFA exploration with direct repair of L SFA branch laceration.  Left groin site with wound vac and MUSHTAQ drain with minimal sanguinous drainage. Of note patient is on Eliquis at home and per Cardiology instructions took a dose the night prior to the procedure and held the AM of the procedure.    Per EP:  Admitted 1/28-1/30/20 for progressively worsening SOB and palpitations. She was diagnosed with and treated for ADHF and AFib with RVR with diruesis and REENA+DCCV on 1/29/20.  TTE noted EF 40% with hypokinesis of all walls except septal.  PET stress 03/2020 without evidence of ischemia. Following hospitalization, Ms. Alvarado reported ongoing SOB and palpitations.  Review of last several EKGs SR/SB. She was continued on eliquis 5mg BID for stroke prophylaxis and metoprolol succinate 50 mg BID for HR control.   Given her symptomatic pAF with arrhythmia induced CM, RFA PVI was elected as best course of treatment.

## 2020-07-21 NOTE — H&P
[facility]  Cardiology  History and Physical     Patient Name: Vicky Alvarado  MRN: [id number]  Admission Date: 7/20/2020  Code Status: Full Code   Attending Provider: Jonathan Hill MD   Primary Care Physician: Gordy Cordero MD  Principal Problem:Hematoma of groin    Patient information was obtained from patient, past medical records and ER records.     Subjective:     Chief Complaint:  Groin hematoma      HPI:  55 year old female with history MVP, HTN, depression, hx opioid dependence and afib who was admitted to CCU for SFA laceration during PCI. S/p RFA PVI with Dr. Hawley on 7/20/20. Procedure complicated by large left SFA hematoma. Vascular surgery consulted and on 7/21/20 patient underwent left SFA exploration with direct repair of L SFA branch laceration.  Left groin site with wound vac and MUSHTAQ drain with minimal sanguinous drainage. Of note patient is on Eliquis at home and per Cardiology instructions took a dose the night prior to the procedure and held the AM of the procedure.    Per EP:  Admitted 1/28-1/30/20 for progressively worsening SOB and palpitations. She was diagnosed with and treated for ADHF and AFib with RVR with diruesis and REENA+DCCV on 1/29/20.  TTE noted EF 40% with hypokinesis of all walls except septal.  PET stress 03/2020 without evidence of ischemia. Following hospitalization, Ms. Alvarado reported ongoing SOB and palpitations.  Review of last several EKGs SR/SB. She was continued on eliquis 5mg BID for stroke prophylaxis and metoprolol succinate 50 mg BID for HR control.   Given her symptomatic pAF with arrhythmia induced CM, RFA PVI was elected as best course of treatment.    Past Medical History:   Diagnosis Date    Atrial fibrillation     cardioversion    Avascular necrosis     L hand    Depression     GERD (gastroesophageal reflux disease)     Hx of psychiatric care     Hypertension     Psychiatric problem        Past Surgical History:    Procedure Laterality Date    CARPAL TUNNEL RELEASE Right 6/10/2020    Procedure: RELEASE, CARPAL TUNNEL - RIGHT;  Surgeon: Adelaida Hall MD;  Location: [facility] [place] OR;  Service: Orthopedics;  Laterality: Right;  GENERAL AND REGIONAL    FOOT SURGERY      TREATMENT OF CARDIAC ARRHYTHMIA N/A 2020    Procedure: CARDIOVERSION;  Surgeon: Gabriel Hawley MD;  Location: [facility] EP LAB;  Service: Cardiology;  Laterality: N/A;  af, demi, dccv, anes, mb, 345       Review of patient's allergies indicates:  No Known Allergies    Current Facility-Administered Medications on File Prior to Encounter   Medication    0.9%  NaCl infusion     Current Outpatient Medications on File Prior to Encounter   Medication Sig    apixaban (ELIQUIS) 5 mg Tab Take 1 tablet (5 mg total) by mouth 2 (two) times daily.    b complex vitamins capsule Take 1 capsule by mouth once daily.    gluc-shikha-[facility]#6-R-cnnd-reymundo-bor 750 mg-644 mg- 30 mg-1 mg Tab Take 1 tablet by mouth once daily.     hydroCHLOROthiazide (HYDRODIURIL) 50 MG tablet Take 1 tablet (50 mg total) by mouth once daily.    krill/om-3/dha/epa/phospho/ast (MEGARED OMEGA-3 KRILL OIL ORAL)     lisinopriL (PRINIVIL,ZESTRIL) 40 MG tablet Take 1 tablet (40 mg total) by mouth once daily.    MELATONIN ORAL Take 1-2 tablets by mouth nightly as needed (Sleep).    multivitamin (THERAGRAN) per tablet Take 1 tablet by mouth once daily.    omeprazole (PRILOSEC) 20 MG capsule TAKE 1 CAPSULE BY MOUTH ONCE DAILY     Family History     Problem Relation (Age of Onset)    Cataracts Mother    Diabetes Father    Hypertension Mother, Father, Sister, Brother    Thyroid disease Mother        Tobacco Use    Smoking status: Former Smoker     Types: Cigarettes     Quit date: 2019     Years since quittin.0    Smokeless tobacco: Never Used   Substance and Sexual Activity    Alcohol use: No    Drug use: No    Sexual activity: Not on file     Review of Systems   Constitution: Positive for  malaise/fatigue. Negative for decreased appetite, fever and weight gain.   HENT: Negative for congestion, hearing loss and nosebleeds.    Eyes: Negative for visual disturbance.   Cardiovascular: Positive for dyspnea on exertion and leg swelling. Negative for chest pain, irregular heartbeat, palpitations and syncope.   Respiratory: Negative for cough, shortness of breath and sleep disturbances due to breathing.    Endocrine: Negative for cold intolerance and heat intolerance.   Hematologic/Lymphatic: Negative for bleeding problem. Does not bruise/bleed easily.   Gastrointestinal: Negative for bloating, abdominal pain, change in bowel habit and heartburn.   Neurological: Negative for dizziness, loss of balance and numbness.   Psychiatric/Behavioral: Negative for altered mental status. The patient is not nervous/anxious.      Objective:     Vital Signs (Most Recent):  Temp: 98.8 °F (37.1 °C) (07/21/20 1213)  Pulse: 87 (07/21/20 1504)  Resp: 16 (07/21/20 1344)  BP: (!) 104/59 (07/21/20 1213)  SpO2: (!) 90 % (07/21/20 1213) Vital Signs (24h Range):  Temp:  [97.2 °F (36.2 °C)-98.8 °F (37.1 °C)] 98.8 °F (37.1 °C)  Pulse:  [65-91] 87  Resp:  [12-29] 16  SpO2:  [90 %-100 %] 90 %  BP: ()/(51-77) 104/59  Arterial Line BP: (155-171)/(78-85) 157/78     Weight: 131.5 kg (290 lb)  Body mass index is 46.81 kg/m².    SpO2: (!) 90 %  O2 Device (Oxygen Therapy): room air      Intake/Output Summary (Last 24 hours) at 7/21/2020 1646  Last data filed at 7/21/2020 1450  Gross per 24 hour   Intake 4897 ml   Output 1350 ml   Net 3547 ml       Lines/Drains/Airways     Central Venous Catheter Line            Percutaneous Central Line Insertion/Assessment - Triple Lumen  07/21/20 0439 less than 1 day          Drain                 Urethral Catheter 07/20/20 1140 Non-latex 16 Fr. 1 day          Peripheral Intravenous Line                 Peripheral IV - Single Lumen 20 G Right Hand -- days         Peripheral IV - Single Lumen 07/20/20  1016 18 G Left Antecubital 1 day                Physical Exam   Constitutional: She is oriented to person, place, and time. Vital signs are normal. She appears well-developed and well-nourished.   HENT:   Head: Normocephalic.   Eyes: Pupils are equal, round, and reactive to light.   Neck: Normal range of motion. Neck supple. No JVD present. Carotid bruit is not present.   Cardiovascular: Normal rate, regular rhythm, S1 normal, S2 normal, normal heart sounds and intact distal pulses. PMI is not displaced. Exam reveals no gallop and no friction rub.   No murmur heard.  Pulses:       Radial pulses are 2+ on the right side and 2+ on the left side.        Dorsalis pedis pulses are 2+ on the right side and 2+ on the left side.   Pulmonary/Chest: Effort normal and breath sounds normal. No accessory muscle usage. She has no decreased breath sounds. She has no wheezes.   Abdominal: Soft. Normal appearance and bowel sounds are normal. She exhibits no distension, no fluid wave and no ascites. There is no hepatosplenomegaly. There is no abdominal tenderness.   Musculoskeletal: Normal range of motion.         General: No edema.      Comments: Left groin with inferior medial and lateral medial MUSHTAQ drains with serosanguinous output. Wound vac placed superior left groin. Left PT and and DP pulses 2+   Neurological: She is alert and oriented to person, place, and time. She has normal strength.   Skin: Skin is warm, dry and intact. No ecchymosis and no rash noted. No erythema.   Psychiatric: She has a normal mood and affect. Her speech is normal and behavior is normal. Judgment and thought content normal. Cognition and memory are normal.   Vitals reviewed.      Significant Labs:   CMP   Recent Labs   Lab 07/21/20  0659   *   K 4.2      CO2 25   *   BUN 19   CREATININE 0.9   CALCIUM 7.7*   ANIONGAP 8   ESTGFRAFRICA >60.0   EGFRNONAA >60.0   , CBC   Recent Labs   Lab 07/21/20  0217  07/21/20  0659 07/21/20  1616   WBC  15.10*  --  17.97* 15.80*   HGB 10.2*  --  9.8* 9.2*   HCT 31.0*   < > 30.2* 27.9*     --  165 193    < > = values in this interval not displayed.   , INR   Recent Labs   Lab 07/20/20 2054   INR 1.0    and All pertinent lab results from the last 24 hours have been reviewed.       Assessment and Plan:     * Hematoma of groin  S/p RFA PVI with Dr. Hawley on 7/20/20. Procedure complicated by large left SFA hematoma. Vascular surgery repaired on 7/21/20. Now with MUSHTAQ drains and wound vac.    - pulses intact   - CBC q8hr  - pain control with prn dilaudid and oxycodone  - vascular surgery and EP following     Depression  - continue duloxetine     Atrial fibrillation  S/p PVi on 7/20    - curently in NSR  - holding eliquis given hematoma   - telemetry     Chronic systolic heart failure  Currently euvolemic    - continue home HCTZ 50 qd, lisinopril 40mg, and toprol 50mg BID    TTE on 5/2020  · Normal left ventricular systolic function. The estimated ejection fraction is 60%.  · Severe left atrial enlargement.  · Grade I (mild) left ventricular diastolic dysfunction consistent with impaired relaxation.  · Normal right ventricular systolic function.  · Mild right atrial enlargement.  · Mild aortic valve stenosis. Aortic valve area is 1.83 cm2; peak velocity is 2.42 m/s; mean gradient is 12 mmHg.  · Mild tricuspid regurgitation.  · Normal central venous pressure (3 mmHg).  · The estimated PA systolic pressure is 22 mmHg.    Hypertension  - continue lisinopril and HCTZ  - BP stable        VTE Risk Mitigation (From admission, onward)    None          Pedro Luis Coyle MD  Cardiology   [facility]

## 2020-07-21 NOTE — ANESTHESIA PROCEDURE NOTES
Central Line    Diagnosis: Left Femoral Artery Hematoma  Patient location during procedure: done in OR  Procedure start time: 7/21/2020 4:39 AM  Timeout: 7/21/2020 4:38 AM  Procedure end time: 7/21/2020 4:52 AM    Staffing  Authorizing Provider: Dre Rankin MD  Performing Provider: Dre Rankin MD    Staffing  Anesthesiologist: Dre Rankin MD  Performed: anesthesiologist   Anesthesiologist was present at the time of the procedure.  Preanesthetic Checklist  Completed: patient identified, site marked, surgical consent, pre-op evaluation, timeout performed, IV checked, risks and benefits discussed, monitors and equipment checked and anesthesia consent given  Indication   Indication: vascular access     Anesthesia   general anesthesia    Central Line   Skin Prep: skin prepped with ChloraPrep, skin prep agent completely dried prior to procedure  maximum sterile barriers used during central venous catheter insertion  hand hygiene performed prior to central venous catheter insertion  Location: left, internal jugular.   Catheter type: triple lumen  Catheter Size: 12 Fr  Ultrasound: vascular probe with ultrasound  Vessel Caliber: medium, patent, compressibility normal  Needle advanced into vessel with real time Ultrasound guidance.  Guidewire confirmed in vessel.  Image recorded and saved.   Manometry: Venous cannualation confirmed by visual estimation of blood vessel pressure using manometry.  Insertion Attempts: 1   Securement:line sutured, chlorhexidine patch, sterile dressing applied and blood return through all ports    Post-Procedure   Adverse Events:none    Guidewire Guidewire removed intact.

## 2020-07-21 NOTE — ASSESSMENT & PLAN NOTE
54 y/o F with an expanding left groin hematoma following cardiac ablation.     To OR for emergent exploration of left femoral artery/vein  Consent obtained  2 units PRBC prepared

## 2020-07-21 NOTE — TRANSFER OF CARE
"Anesthesia Transfer of Care Note    Patient: Vicky Alvarado    Procedure(s) Performed: Procedure(s) (LRB):  EXPLORATION, ARTERY, FEMORAL (Left)    Patient location: PACU    Anesthesia Type: CSE and general    Transport from OR: Transported from OR on 6-10 L/min O2 by face mask with adequate spontaneous ventilation    Post pain: adequate analgesia    Post assessment: no apparent anesthetic complications and tolerated procedure well    Post vital signs: stable    Level of consciousness: awake, alert and oriented    Nausea/Vomiting: no nausea/vomiting    Complications: none    Transfer of care protocol was followed      Last vitals:   Visit Vitals  /65   Pulse 81   Temp 36.5 °C (97.7 °F) (Temporal)   Resp (!) 29   Ht 5' 6" (1.676 m)   Wt 131.5 kg (290 lb)   LMP 06/08/2015   SpO2 98%   Breastfeeding No   BMI 46.81 kg/m²     "

## 2020-07-22 LAB
ANION GAP SERPL CALC-SCNC: 5 MMOL/L (ref 8–16)
BASOPHILS # BLD AUTO: 0.05 K/UL (ref 0–0.2)
BASOPHILS # BLD AUTO: 0.06 K/UL (ref 0–0.2)
BASOPHILS # BLD AUTO: 0.07 K/UL (ref 0–0.2)
BASOPHILS NFR BLD: 0.5 % (ref 0–1.9)
BASOPHILS NFR BLD: 0.6 % (ref 0–1.9)
BASOPHILS NFR BLD: 0.7 % (ref 0–1.9)
BUN SERPL-MCNC: 19 MG/DL (ref 6–20)
CALCIUM SERPL-MCNC: 7.8 MG/DL (ref 8.7–10.5)
CHLORIDE SERPL-SCNC: 98 MMOL/L (ref 95–110)
CO2 SERPL-SCNC: 32 MMOL/L (ref 23–29)
CREAT SERPL-MCNC: 0.8 MG/DL (ref 0.5–1.4)
DIFFERENTIAL METHOD: ABNORMAL
EOSINOPHIL # BLD AUTO: 0.2 K/UL (ref 0–0.5)
EOSINOPHIL # BLD AUTO: 0.3 K/UL (ref 0–0.5)
EOSINOPHIL # BLD AUTO: 0.4 K/UL (ref 0–0.5)
EOSINOPHIL NFR BLD: 2 % (ref 0–8)
EOSINOPHIL NFR BLD: 2.6 % (ref 0–8)
EOSINOPHIL NFR BLD: 3.8 % (ref 0–8)
ERYTHROCYTE [DISTWIDTH] IN BLOOD BY AUTOMATED COUNT: 14.7 % (ref 11.5–14.5)
ERYTHROCYTE [DISTWIDTH] IN BLOOD BY AUTOMATED COUNT: 14.8 % (ref 11.5–14.5)
ERYTHROCYTE [DISTWIDTH] IN BLOOD BY AUTOMATED COUNT: 14.8 % (ref 11.5–14.5)
EST. GFR  (AFRICAN AMERICAN): >60 ML/MIN/1.73 M^2
EST. GFR  (NON AFRICAN AMERICAN): >60 ML/MIN/1.73 M^2
GLUCOSE SERPL-MCNC: 125 MG/DL (ref 70–110)
HCT VFR BLD AUTO: 26.2 % (ref 37–48.5)
HCT VFR BLD AUTO: 26.4 % (ref 37–48.5)
HCT VFR BLD AUTO: 27.9 % (ref 37–48.5)
HGB BLD-MCNC: 8.2 G/DL (ref 12–16)
HGB BLD-MCNC: 8.4 G/DL (ref 12–16)
HGB BLD-MCNC: 8.8 G/DL (ref 12–16)
IMM GRANULOCYTES # BLD AUTO: 0.06 K/UL (ref 0–0.04)
IMM GRANULOCYTES # BLD AUTO: 0.08 K/UL (ref 0–0.04)
IMM GRANULOCYTES # BLD AUTO: 0.09 K/UL (ref 0–0.04)
IMM GRANULOCYTES NFR BLD AUTO: 0.6 % (ref 0–0.5)
IMM GRANULOCYTES NFR BLD AUTO: 0.7 % (ref 0–0.5)
IMM GRANULOCYTES NFR BLD AUTO: 0.9 % (ref 0–0.5)
LACTATE SERPL-SCNC: 1.2 MMOL/L (ref 0.5–2.2)
LYMPHOCYTES # BLD AUTO: 1.6 K/UL (ref 1–4.8)
LYMPHOCYTES # BLD AUTO: 1.6 K/UL (ref 1–4.8)
LYMPHOCYTES # BLD AUTO: 2 K/UL (ref 1–4.8)
LYMPHOCYTES NFR BLD: 14.7 % (ref 18–48)
LYMPHOCYTES NFR BLD: 15 % (ref 18–48)
LYMPHOCYTES NFR BLD: 19.7 % (ref 18–48)
MCH RBC QN AUTO: 27.7 PG (ref 27–31)
MCH RBC QN AUTO: 28 PG (ref 27–31)
MCH RBC QN AUTO: 28.4 PG (ref 27–31)
MCHC RBC AUTO-ENTMCNC: 31.1 G/DL (ref 32–36)
MCHC RBC AUTO-ENTMCNC: 31.5 G/DL (ref 32–36)
MCHC RBC AUTO-ENTMCNC: 32.1 G/DL (ref 32–36)
MCV RBC AUTO: 89 FL (ref 82–98)
MONOCYTES # BLD AUTO: 1.2 K/UL (ref 0.3–1)
MONOCYTES # BLD AUTO: 1.2 K/UL (ref 0.3–1)
MONOCYTES # BLD AUTO: 1.4 K/UL (ref 0.3–1)
MONOCYTES NFR BLD: 10.9 % (ref 4–15)
MONOCYTES NFR BLD: 11.8 % (ref 4–15)
MONOCYTES NFR BLD: 13.1 % (ref 4–15)
NEUTROPHILS # BLD AUTO: 6.7 K/UL (ref 1.8–7.7)
NEUTROPHILS # BLD AUTO: 7 K/UL (ref 1.8–7.7)
NEUTROPHILS # BLD AUTO: 7.7 K/UL (ref 1.8–7.7)
NEUTROPHILS NFR BLD: 65.4 % (ref 38–73)
NEUTROPHILS NFR BLD: 66.5 % (ref 38–73)
NEUTROPHILS NFR BLD: 70.5 % (ref 38–73)
NRBC BLD-RTO: 0 /100 WBC
PLATELET # BLD AUTO: 144 K/UL (ref 150–350)
PLATELET # BLD AUTO: 150 K/UL (ref 150–350)
PLATELET # BLD AUTO: 163 K/UL (ref 150–350)
PMV BLD AUTO: 11.3 FL (ref 9.2–12.9)
PMV BLD AUTO: 11.6 FL (ref 9.2–12.9)
PMV BLD AUTO: 11.6 FL (ref 9.2–12.9)
POTASSIUM SERPL-SCNC: 3.5 MMOL/L (ref 3.5–5.1)
RBC # BLD AUTO: 2.96 M/UL (ref 4–5.4)
RBC # BLD AUTO: 2.96 M/UL (ref 4–5.4)
RBC # BLD AUTO: 3.14 M/UL (ref 4–5.4)
SODIUM SERPL-SCNC: 135 MMOL/L (ref 136–145)
WBC # BLD AUTO: 10.18 K/UL (ref 3.9–12.7)
WBC # BLD AUTO: 10.51 K/UL (ref 3.9–12.7)
WBC # BLD AUTO: 10.87 K/UL (ref 3.9–12.7)

## 2020-07-22 PROCEDURE — 25000003 PHARM REV CODE 250: Performed by: STUDENT IN AN ORGANIZED HEALTH CARE EDUCATION/TRAINING PROGRAM

## 2020-07-22 PROCEDURE — 99232 PR SUBSEQUENT HOSPITAL CARE,LEVL II: ICD-10-PCS | Mod: ,,, | Performed by: INTERNAL MEDICINE

## 2020-07-22 PROCEDURE — 85025 COMPLETE CBC W/AUTO DIFF WBC: CPT | Mod: 91

## 2020-07-22 PROCEDURE — 94761 N-INVAS EAR/PLS OXIMETRY MLT: CPT

## 2020-07-22 PROCEDURE — 97161 PT EVAL LOW COMPLEX 20 MIN: CPT

## 2020-07-22 PROCEDURE — 99232 SBSQ HOSP IP/OBS MODERATE 35: CPT | Mod: ,,, | Performed by: INTERNAL MEDICINE

## 2020-07-22 PROCEDURE — 25000003 PHARM REV CODE 250: Performed by: INTERNAL MEDICINE

## 2020-07-22 PROCEDURE — 63600175 PHARM REV CODE 636 W HCPCS: Performed by: STUDENT IN AN ORGANIZED HEALTH CARE EDUCATION/TRAINING PROGRAM

## 2020-07-22 PROCEDURE — 97530 THERAPEUTIC ACTIVITIES: CPT

## 2020-07-22 PROCEDURE — 83605 ASSAY OF LACTIC ACID: CPT

## 2020-07-22 PROCEDURE — 36415 COLL VENOUS BLD VENIPUNCTURE: CPT

## 2020-07-22 PROCEDURE — 25000003 PHARM REV CODE 250: Performed by: NURSE PRACTITIONER

## 2020-07-22 PROCEDURE — 20600001 HC STEP DOWN PRIVATE ROOM

## 2020-07-22 PROCEDURE — 80048 BASIC METABOLIC PNL TOTAL CA: CPT

## 2020-07-22 RX ORDER — ENOXAPARIN SODIUM 100 MG/ML
40 INJECTION SUBCUTANEOUS EVERY 12 HOURS
Status: DISCONTINUED | OUTPATIENT
Start: 2020-07-22 | End: 2020-07-23

## 2020-07-22 RX ORDER — ENOXAPARIN SODIUM 100 MG/ML
40 INJECTION SUBCUTANEOUS EVERY 24 HOURS
Status: DISCONTINUED | OUTPATIENT
Start: 2020-07-22 | End: 2020-07-22

## 2020-07-22 RX ORDER — POTASSIUM CHLORIDE 20 MEQ/1
40 TABLET, EXTENDED RELEASE ORAL ONCE
Status: COMPLETED | OUTPATIENT
Start: 2020-07-22 | End: 2020-07-22

## 2020-07-22 RX ADMIN — LISINOPRIL 40 MG: 20 TABLET ORAL at 10:07

## 2020-07-22 RX ADMIN — ENOXAPARIN SODIUM 40 MG: 100 INJECTION SUBCUTANEOUS at 04:07

## 2020-07-22 RX ADMIN — CALCIUM CARBONATE (ANTACID) CHEW TAB 500 MG 500 MG: 500 CHEW TAB at 04:07

## 2020-07-22 RX ADMIN — DOCUSATE SODIUM 50MG AND SENNOSIDES 8.6MG 1 TABLET: 8.6; 5 TABLET, FILM COATED ORAL at 01:07

## 2020-07-22 RX ADMIN — TRAZODONE HYDROCHLORIDE 100 MG: 100 TABLET ORAL at 10:07

## 2020-07-22 RX ADMIN — POTASSIUM CHLORIDE 40 MEQ: 1500 TABLET, EXTENDED RELEASE ORAL at 08:07

## 2020-07-22 RX ADMIN — PANTOPRAZOLE SODIUM 40 MG: 40 TABLET, DELAYED RELEASE ORAL at 08:07

## 2020-07-22 RX ADMIN — OXYCODONE HYDROCHLORIDE 5 MG: 5 TABLET ORAL at 04:07

## 2020-07-22 RX ADMIN — METOPROLOL SUCCINATE 50 MG: 50 TABLET, EXTENDED RELEASE ORAL at 10:07

## 2020-07-22 RX ADMIN — OXYCODONE HYDROCHLORIDE 5 MG: 5 TABLET ORAL at 01:07

## 2020-07-22 RX ADMIN — OXYCODONE HYDROCHLORIDE 5 MG: 5 TABLET ORAL at 09:07

## 2020-07-22 RX ADMIN — DULOXETINE HYDROCHLORIDE 120 MG: 60 CAPSULE, DELAYED RELEASE ORAL at 08:07

## 2020-07-22 NOTE — ASSESSMENT & PLAN NOTE
S/p RFA PVI with Dr. Hawley on 7/20/20. Procedure complicated by large left SFA hematoma. Vascular surgery repaired on 7/21/20. Now with MUSHTAQ drains and wound vac.    - Pain improved overnight.   - Hgb 9.2-->8.2.   - US negative for arterial bleeding.   - MUSHTAQ drains in place   - Plan to restart eliquis once vascular approves  - CBC q8hr  - pain control with prn dilaudid and oxycodone  - vascular surgery and EP following

## 2020-07-22 NOTE — PLAN OF CARE
Problem: Physical Therapy Goal  Goal: Physical Therapy Goal  Description: Goals to be met by: 20     Patient will increase functional independence with mobility by performin. Supine to sit with Modified [place]man  2. Sit to supine with Modified Ste[place]man  3. Sit to stand transfer with Modified Ste[place]man  4. Bed to chair transfer with Modified Ste[place]man using Rolling Walker or no AD  5. Gait  x 250 feet with Modified Steedman using Rolling Walker or no AD  6. Lower extremity exercise program x30 reps per handout, with independence    Outcome: Ongoing, Progressing     PT eval complete. Patient is safe to return home once medically ready and would benefit from home health physical therapy in order to improve safety and independence in the home environment.   DME needs: Bariatric Rolling Walker      Libra Green, PT, DPT  2020

## 2020-07-22 NOTE — PROGRESS NOTES
"VASCULAR SURGERY  Afternoon Progress Note    Assessment/Plan:  55 y.o. female with left groin hematoma taken emergently to OR for evacuation and SFA repair POD 2. Doing well. Drains thinning, but still with significant output.       Subjective:  No acute events over day    Objective:  /60 (Patient Position: Sitting)   Pulse 68   Temp 98.2 °F (36.8 °C) (Oral)   Resp 12   Ht 5' 6" (1.676 m)   Wt 131.5 kg (290 lb)   LMP 06/08/2015   SpO2 97%   Breastfeeding No   BMI 46.81 kg/m²   Left groin soft. Drains thin red blood. Mildly tender. Vac intact.   -continue drains, strip every few hours  -hold off on AC for now, will reassess in AM    Elvin Wood, PGY3  General Surgery  [phone]      "

## 2020-07-22 NOTE — PLAN OF CARE
Patient free from falls and injuries. Skin clean, dry, and intact. 1 wound vac to L groin site. 2 MUSHTAQ w/ accordion drains to L thigh (anterior and ventral), tubing stripped and drains emptied q 2 hours. R groin dressing CDI, no hematoma noted. Gurrola in place. Reviewed plan of care with patient. Patient has gotten up to the chair and to the restroom with minimal assistance. Patient AAOX4, vital signs stable. In NAD. Patient receiving Oxy 5mg q4hrs PRN for pain. No questions or concerns at this time. Will continue to monitor.

## 2020-07-22 NOTE — NURSING
Gurrola removed per order. Balloon deflated prior to removal. Pt tolerated well. Due to void by 1700. Pt aware. Will continue to monitor.

## 2020-07-22 NOTE — PLAN OF CARE
07/22/20 0720   Discharge Reassessment   Assessment Type Discharge Planning Reassessment   Discharge Plan A Home;Home Health   MUSHTAQ drains and Wound Vac

## 2020-07-22 NOTE — PHYSICIAN QUERY
PT Name: Vicky Alvarado  MR #: [id number]    CAUSE AND EFFECT RELATIONSHIP CLARIFICATION     CDS/: Florinda Nesbitt               Contact information:[email or web address]    This form is a permanent document in the medical record.     Query Date: July 22, 2020    By submitting this query, we are merely seeking further clarification of documentation. Please utilize your independent clinical judgment when addressing the question(s) below.    Supporting Clinical Findings   Location in Medical Record   56 y/o F with a pmh of  MVP, HTN, depression, hx opioid dependenc and recent diagnosis of atrial fibrillation. She underwent an ablation today with Cardiology.     Post procedure she was noted to have a hematoma in her left groin. At this time pressure was held and an ultrasound was obtained. Despite holding pressure her hematoma continued to expand. U/s did not show active flow into the hematoma or evidence of a pseudoaneurysm. Of note patient is on Eliquis at home and per Cardiology instructions took a dose the night prior to the procedure and held the AM of the procedure                                                                                                                                                                                      Vascular surgery note 7-21                                                                                                                                                                                                       Provider, please clarify if there is any clinical correlation between Hematoma and Eliquis.           Are the conditions:      [  ] Due to or associated with each other   [x  ] Unrelated to each other   [  ] Other explanation (Please Specify): ______________   [  ] Clinically Undetermined                                                                               Please document in your progress notes daily for the duration  of treatment until resolved and include in your discharge summary.

## 2020-07-22 NOTE — NURSING
Central line removed per order. Tolerated well without any complication. Sutures removed. Occlusive dressing and gauze applied. Pressure held for 10 minutes. No hematoma or bleeding noted.  Educated patient to lay flat for 30 minutes. Informed family and patient of any warning signs and when to notify nurse. Will continue to monitor.

## 2020-07-22 NOTE — ASSESSMENT & PLAN NOTE
54 y/o F with an expanding left groin hematoma following cardiac ablation. Now s/p primary repair with drains x2 in place. Drains continue to put out significant drainage, although the content is clear.    -continue with drains, record output and strip every few hours  -continue skin wound vac  -remaining care per primary.

## 2020-07-22 NOTE — SUBJECTIVE & OBJECTIVE
Interval History:   Pain improved overnight. Hgb9.2-->8.2. US negative for continued arterial bleeding. MUSHTAQ drains in place with 120 and 480 cc of serosanguinous output yesterday. Good LE pulses. vascular following. Plan to restart eliquis once vascular approves    Review of Systems   Constitution: Positive for malaise/fatigue. Negative for decreased appetite, fever and weight gain.   HENT: Negative for congestion, hearing loss and nosebleeds.    Eyes: Negative for visual disturbance.   Cardiovascular: Positive for dyspnea on exertion and leg swelling. Negative for chest pain, irregular heartbeat, palpitations and syncope.   Respiratory: Negative for cough, shortness of breath and sleep disturbances due to breathing.    Endocrine: Negative for cold intolerance and heat intolerance.   Hematologic/Lymphatic: Negative for bleeding problem. Does not bruise/bleed easily.   Gastrointestinal: Negative for bloating, abdominal pain, change in bowel habit and heartburn.   Neurological: Negative for dizziness, loss of balance and numbness.   Psychiatric/Behavioral: Negative for altered mental status. The patient is not nervous/anxious.      Objective:     Vital Signs (Most Recent):  Temp: 97.1 °F (36.2 °C) (07/22/20 1317)  Pulse: 66 (07/22/20 1317)  Resp: 12 (07/22/20 1317)  BP: (!) 99/50 (07/22/20 1317)  SpO2: 99 % (07/22/20 1317) Vital Signs (24h Range):  Temp:  [97.1 °F (36.2 °C)-99 °F (37.2 °C)] 97.1 °F (36.2 °C)  Pulse:  [62-87] 66  Resp:  [12-19] 12  SpO2:  [94 %-100 %] 99 %  BP: ()/(46-56) 99/50     Weight: 131.5 kg (290 lb)  Body mass index is 46.81 kg/m².     SpO2: 99 %  O2 Device (Oxygen Therapy): room air      Intake/Output Summary (Last 24 hours) at 7/22/2020 1325  Last data filed at 7/22/2020 1200  Gross per 24 hour   Intake 720 ml   Output 3575 ml   Net -2855 ml       Lines/Drains/Airways     Drain                 Closed/Suction Drain 07/21/20 0423 Left;Proximal;Anterior Thigh Accordion 1 day          Closed/Suction Drain 07/21/20 0423 Left;Ventral Thigh Accordion 1 day          Peripheral Intravenous Line                 Peripheral IV - Single Lumen 20 G Right Hand -- days         Peripheral IV - Single Lumen 07/20/20 1016 18 G Left Antecubital 2 days                Physical Exam   Constitutional: She is oriented to person, place, and time. Vital signs are normal. She appears well-developed and well-nourished.   HENT:   Head: Normocephalic.   Eyes: Pupils are equal, round, and reactive to light.   Neck: Normal range of motion. Neck supple. No JVD present. Carotid bruit is not present.   Cardiovascular: Normal rate, regular rhythm, S1 normal, S2 normal, normal heart sounds and intact distal pulses. PMI is not displaced. Exam reveals no gallop and no friction rub.   No murmur heard.  Pulses:       Radial pulses are 2+ on the right side and 2+ on the left side.        Dorsalis pedis pulses are 2+ on the right side and 2+ on the left side.   Pulmonary/Chest: Effort normal and breath sounds normal. No accessory muscle usage. She has no decreased breath sounds. She has no wheezes.   Abdominal: Soft. Normal appearance and bowel sounds are normal. She exhibits no distension, no fluid wave and no ascites. There is no hepatosplenomegaly. There is no abdominal tenderness.   Musculoskeletal: Normal range of motion.         General: No edema.      Comments: Left groin with inferior medial and lateral medial MUSHTAQ drains with serosanguinous output. Wound vac placed superior left groin. Left PT and and DP pulses 2+   Neurological: She is alert and oriented to person, place, and time. She has normal strength.   Skin: Skin is warm, dry and intact. No ecchymosis and no rash noted. No erythema.   Psychiatric: She has a normal mood and affect. Her speech is normal and behavior is normal. Judgment and thought content normal. Cognition and memory are normal.   Vitals reviewed.      Significant Labs:   CMP   Recent Labs   Lab  07/21/20  0659 07/22/20  0409   * 135*   K 4.2 3.5    98   CO2 25 32*   * 125*   BUN 19 19   CREATININE 0.9 0.8   CALCIUM 7.7* 7.8*   ANIONGAP 8 5*   ESTGFRAFRICA >60.0 >60.0   EGFRNONAA >60.0 >60.0   , CBC   Recent Labs   Lab 07/21/20  1616 07/22/20  0409 07/22/20  0854   WBC 15.80* 10.18 10.87   HGB 9.2* 8.2* 8.4*   HCT 27.9* 26.4* 26.2*    144* 150   , INR   Recent Labs   Lab 07/20/20 2054   INR 1.0    and All pertinent lab results from the last 24 hours have been reviewed.

## 2020-07-22 NOTE — PROGRESS NOTES
07/22/20 0756 07/22/20 0928   Vital Signs   BP (!) 96/46 (!) 104/53   MAP (mmHg) 66 76     Pt to receive lisinopril, hydrochlorothiazide and metoprolol with AM meds. MAP 66. Notified cardiology, received orders to hold hydrochlorothiazide and recheck BP in an hour. Recheck MAP 76, MD ok to receive metoprolol and lisinopril. Will continue to monitor.

## 2020-07-22 NOTE — PROGRESS NOTES
[facility]  Vascular Surgery  Progress Note    Patient Name: Vicky Alvarado  MRN: [id number]  Admission Date: 7/20/2020  Primary Care Provider: Gordy Cordero MD    Subjective:     Interval History: no acute events overnight.     Post-Op Info:  Procedure(s) (LRB):  EXPLORATION, ARTERY, FEMORAL (Left)   1 Day Post-Op       Medications:  Continuous Infusions:  Scheduled Meds:   DULoxetine  120 mg Oral Daily    hydroCHLOROthiazide  50 mg Oral Daily    lisinopriL  40 mg Oral Daily    metoprolol succinate  50 mg Oral BID    pantoprazole  40 mg Oral Daily     PRN Meds:acetaminophen, calcium carbonate, diphenhydrAMINE, HYDROmorphone, HYDROmorphone, melatonin, ondansetron, oxyCODONE, promethazine (PHENERGAN) IVPB, senna-docusate 8.6-50 mg, sodium chloride 0.9%, traZODone     Objective:     Vital Signs (Most Recent):  Temp: 98.7 °F (37.1 °C) (07/22/20 0409)  Pulse: 67 (07/22/20 0409)  Resp: 16 (07/22/20 0433)  BP: (!) 115/56 (07/22/20 0409)  SpO2: 97 % (07/22/20 0409) Vital Signs (24h Range):  Temp:  [97.2 °F (36.2 °C)-99 °F (37.2 °C)] 98.7 °F (37.1 °C)  Pulse:  [62-91] 67  Resp:  [12-29] 16  SpO2:  [90 %-100 %] 97 %  BP: ()/(48-77) 115/56  Arterial Line BP: (155-171)/(78-85) 157/78         Physical Exam  Vitals signs and nursing note reviewed.   Constitutional:       Appearance: Normal appearance.   HENT:      Head: Normocephalic and atraumatic.   Pulmonary:      Effort: Pulmonary effort is normal.   Musculoskeletal:      Comments: Wound vac to left groin   Mild swelling of left foot  Drains thin.    Neurological:      Mental Status: She is alert.     480 drain 1   120 drain 2    Significant Labs:    Recent Labs   Lab 07/22/20  0854   WBC 10.87   RBC 2.96*   HGB 8.4*   HCT 26.2*      MCV 89   MCH 28.4   MCHC 32.1     CMP:   Recent Labs   Lab 07/22/20  0409   *   CALCIUM 7.8*   *   K 3.5   CO2 32*   CL 98   BUN 19   CREATININE 0.8       Significant Diagnostics:  I  have reviewed all pertinent imaging results/findings within the past 24 hours.    Assessment/Plan:     * Hematoma of groin  56 y/o F with an expanding left groin hematoma following cardiac ablation. Now s/p primary repair with drains x2 in place. Drains continue to put out significant drainage, although the content is clear.    -continue with drains, record output and strip every few hours  -continue skin wound vac  -remaining care per primary.         Elvin Wood MD  Vascular Surgery  [facility]

## 2020-07-22 NOTE — SUBJECTIVE & OBJECTIVE
Medications:  Continuous Infusions:  Scheduled Meds:   DULoxetine  120 mg Oral Daily    hydroCHLOROthiazide  50 mg Oral Daily    lisinopriL  40 mg Oral Daily    metoprolol succinate  50 mg Oral BID    pantoprazole  40 mg Oral Daily     PRN Meds:acetaminophen, calcium carbonate, diphenhydrAMINE, HYDROmorphone, HYDROmorphone, melatonin, ondansetron, oxyCODONE, promethazine (PHENERGAN) IVPB, senna-docusate 8.6-50 mg, sodium chloride 0.9%, traZODone     Objective:     Vital Signs (Most Recent):  Temp: 98.7 °F (37.1 °C) (07/22/20 0409)  Pulse: 67 (07/22/20 0409)  Resp: 16 (07/22/20 0433)  BP: (!) 115/56 (07/22/20 0409)  SpO2: 97 % (07/22/20 0409) Vital Signs (24h Range):  Temp:  [97.2 °F (36.2 °C)-99 °F (37.2 °C)] 98.7 °F (37.1 °C)  Pulse:  [62-91] 67  Resp:  [12-29] 16  SpO2:  [90 %-100 %] 97 %  BP: ()/(48-77) 115/56  Arterial Line BP: (155-171)/(78-85) 157/78         Physical Exam  Vitals signs and nursing note reviewed.   Constitutional:       Appearance: Normal appearance.   HENT:      Head: Normocephalic and atraumatic.   Pulmonary:      Effort: Pulmonary effort is normal.   Musculoskeletal:      Comments: Wound vac to left groin   Mild swelling of left foot  Drains thin.    Neurological:      Mental Status: She is alert.     480 drain 1   120 drain 2    Significant Labs:    Recent Labs   Lab 07/22/20  0854   WBC 10.87   RBC 2.96*   HGB 8.4*   HCT 26.2*      MCV 89   MCH 28.4   MCHC 32.1     CMP:   Recent Labs   Lab 07/22/20  0409   *   CALCIUM 7.8*   *   K 3.5   CO2 32*   CL 98   BUN 19   CREATININE 0.8       Significant Diagnostics:  I have reviewed all pertinent imaging results/findings within the past 24 hours.

## 2020-07-22 NOTE — ASSESSMENT & PLAN NOTE
S/p PVi on 7/20    - curently in NSR  - holding eliquis given hematoma, will restart once vascular approves  - telemetry

## 2020-07-22 NOTE — ANESTHESIA POSTPROCEDURE EVALUATION
Anesthesia Post Evaluation    Patient: Vicky Alvarado    Procedure(s) Performed: Procedure(s) (LRB):  EXPLORATION, ARTERY, FEMORAL (Left)    Final Anesthesia Type: general    Patient location during evaluation: PACU  Patient participation: Yes- Able to Participate  Level of consciousness: awake and alert  Post-procedure vital signs: reviewed and stable  Pain management: adequate  Airway patency: patent    PONV status at discharge: No PONV  Anesthetic complications: no      Cardiovascular status: blood pressure returned to baseline and stable  Respiratory status: unassisted  Hydration status: euvolemic  Follow-up not needed.          Vitals Value Taken Time   BP 99/51 07/21/20 1651   Temp 36.5 °C (97.7 °F) 07/21/20 1651   Pulse 73 07/21/20 1651   Resp 16 07/21/20 1827   SpO2 100 % 07/21/20 1651         Event Time   Out of Recovery 07/21/2020 07:00:00         Pain/Tolu Score: Pain Rating Prior to Med Admin: 6 (7/21/2020  6:27 PM)  Tolu Score: 9 (7/21/2020  8:00 AM)

## 2020-07-22 NOTE — PLAN OF CARE
07/22/20 1050   Post-Acute Status   Post-Acute Authorization Home Health   Home Health Status Referrals Sent     SW sent HH referral to [facility] (pt has [employer] [facility] employee insurance) for scheduled discharge Friday 7/24/20.  NORM informed by Annabel with [facility] that they will put pt on the calendar for Sunday 7/26/20 but will move her up to Saturday 7/25/20 if a spot becomes available.  NORM will continue to follow.    Marah Martinez LMSW  [facility]  [phone]

## 2020-07-22 NOTE — PROGRESS NOTES
[facility]  Cardiology  Progress Note    Patient Name: Vicky Alvarado  MRN: [id number]  Admission Date: 7/20/2020  Hospital Length of Stay: 2 days  Code Status: Full Code   Attending Physician: Jonathan Hill MD   Primary Care Physician: Gordy Cordero MD  Expected Discharge Date: 7/24/2020  Principal Problem:Hematoma of groin    Subjective:     Hospital Course:   S/p RFA PVI with Dr. Hawley on 7/20/20. Procedure complicated by large left SFA hematoma. Vascular surgery repaired on 7/21/20. Now with MUSHTAQ drains and wound vac.    Interval History:   Pain improved overnight. Hgb9.2-->8.2. US negative for continued arterial bleeding. MUSHTAQ drains in place with 120 and 480 cc of serosanguinous output yesterday. Good LE pulses. vascular following. Plan to restart eliquis once vascular approves    Review of Systems   Constitution: Positive for malaise/fatigue. Negative for decreased appetite, fever and weight gain.   HENT: Negative for congestion, hearing loss and nosebleeds.    Eyes: Negative for visual disturbance.   Cardiovascular: Positive for dyspnea on exertion and leg swelling. Negative for chest pain, irregular heartbeat, palpitations and syncope.   Respiratory: Negative for cough, shortness of breath and sleep disturbances due to breathing.    Endocrine: Negative for cold intolerance and heat intolerance.   Hematologic/Lymphatic: Negative for bleeding problem. Does not bruise/bleed easily.   Gastrointestinal: Negative for bloating, abdominal pain, change in bowel habit and heartburn.   Neurological: Negative for dizziness, loss of balance and numbness.   Psychiatric/Behavioral: Negative for altered mental status. The patient is not nervous/anxious.      Objective:     Vital Signs (Most Recent):  Temp: 97.1 °F (36.2 °C) (07/22/20 1317)  Pulse: 66 (07/22/20 1317)  Resp: 12 (07/22/20 1317)  BP: (!) 99/50 (07/22/20 1317)  SpO2: 99 % (07/22/20 1317) Vital Signs (24h Range):  Temp:  [97.1 °F  (36.2 °C)-99 °F (37.2 °C)] 97.1 °F (36.2 °C)  Pulse:  [62-87] 66  Resp:  [12-19] 12  SpO2:  [94 %-100 %] 99 %  BP: ()/(46-56) 99/50     Weight: 131.5 kg (290 lb)  Body mass index is 46.81 kg/m².     SpO2: 99 %  O2 Device (Oxygen Therapy): room air      Intake/Output Summary (Last 24 hours) at 7/22/2020 1325  Last data filed at 7/22/2020 1200  Gross per 24 hour   Intake 720 ml   Output 3575 ml   Net -2855 ml       Lines/Drains/Airways     Drain                 Closed/Suction Drain 07/21/20 0423 Left;Proximal;Anterior Thigh Accordion 1 day         Closed/Suction Drain 07/21/20 0423 Left;Ventral Thigh Accordion 1 day          Peripheral Intravenous Line                 Peripheral IV - Single Lumen 20 G Right Hand -- days         Peripheral IV - Single Lumen 07/20/20 1016 18 G Left Antecubital 2 days                Physical Exam   Constitutional: She is oriented to person, place, and time. Vital signs are normal. She appears well-developed and well-nourished.   HENT:   Head: Normocephalic.   Eyes: Pupils are equal, round, and reactive to light.   Neck: Normal range of motion. Neck supple. No JVD present. Carotid bruit is not present.   Cardiovascular: Normal rate, regular rhythm, S1 normal, S2 normal, normal heart sounds and intact distal pulses. PMI is not displaced. Exam reveals no gallop and no friction rub.   No murmur heard.  Pulses:       Radial pulses are 2+ on the right side and 2+ on the left side.        Dorsalis pedis pulses are 2+ on the right side and 2+ on the left side.   Pulmonary/Chest: Effort normal and breath sounds normal. No accessory muscle usage. She has no decreased breath sounds. She has no wheezes.   Abdominal: Soft. Normal appearance and bowel sounds are normal. She exhibits no distension, no fluid wave and no ascites. There is no hepatosplenomegaly. There is no abdominal tenderness.   Musculoskeletal: Normal range of motion.         General: No edema.      Comments: Left groin with  inferior medial and lateral medial MUSHTAQ drains with serosanguinous output. Wound vac placed superior left groin. Left PT and and DP pulses 2+   Neurological: She is alert and oriented to person, place, and time. She has normal strength.   Skin: Skin is warm, dry and intact. No ecchymosis and no rash noted. No erythema.   Psychiatric: She has a normal mood and affect. Her speech is normal and behavior is normal. Judgment and thought content normal. Cognition and memory are normal.   Vitals reviewed.      Significant Labs:   CMP   Recent Labs   Lab 07/21/20  0659 07/22/20  0409   * 135*   K 4.2 3.5    98   CO2 25 32*   * 125*   BUN 19 19   CREATININE 0.9 0.8   CALCIUM 7.7* 7.8*   ANIONGAP 8 5*   ESTGFRAFRICA >60.0 >60.0   EGFRNONAA >60.0 >60.0   , CBC   Recent Labs   Lab 07/21/20  1616 07/22/20  0409 07/22/20  0854   WBC 15.80* 10.18 10.87   HGB 9.2* 8.2* 8.4*   HCT 27.9* 26.4* 26.2*    144* 150   , INR   Recent Labs   Lab 07/20/20 2054   INR 1.0    and All pertinent lab results from the last 24 hours have been reviewed.        Assessment and Plan:       * Hematoma of groin  S/p RFA PVI with Dr. Hawley on 7/20/20. Procedure complicated by large left SFA hematoma. Vascular surgery repaired on 7/21/20. Now with MUSHTAQ drains and wound vac.    - Pain improved overnight.   - Hgb 9.2-->8.2  - US negative for arterial bleeding.   - MUSHTAQ drains in place. Per vascular, will need for a couple more days before drain removal. Also, may need to go home with wound vac  - Plan to restart eliquis once vascular approves  - CBC q8hr  - pain control with prn dilaudid and oxycodone  - vascular surgery and EP following     Depression  - continue duloxetine     Atrial fibrillation  S/p PVi on 7/20    - curently in NSR  - holding eliquis given hematoma, will restart once vascular approves  - telemetry     Chronic systolic heart failure  Currently euvolemic    - continue home HCTZ 50 qd, lisinopril 40mg, and toprol 50mg  BID    TTE on 5/2020  · Normal left ventricular systolic function. The estimated ejection fraction is 60%.  · Severe left atrial enlargement.  · Grade I (mild) left ventricular diastolic dysfunction consistent with impaired relaxation.  · Normal right ventricular systolic function.  · Mild right atrial enlargement.  · Mild aortic valve stenosis. Aortic valve area is 1.83 cm2; peak velocity is 2.42 m/s; mean gradient is 12 mmHg.  · Mild tricuspid regurgitation.  · Normal central venous pressure (3 mmHg).  · The estimated PA systolic pressure is 22 mmHg.    Hypertension  - continue lisinopril and HCTZ  - BP low normal         VTE Risk Mitigation (From admission, onward)         Ordered     Place sequential compression device  Until discontinued      07/22/20 0915                Pedro Luis Coyle MD  Cardiology  [facility]

## 2020-07-22 NOTE — PLAN OF CARE
Plan of care discussed with patient. Patient is free of fall/trauma/injury, intermittently getting up in chair and walking with PT. Denies CP, SOB. Pain controled by PRN medications. Accordian drains x2 stripped as ordered and I&O's documented. Prevena in place as well. H/H drawn q8, trending up. Gurrola and central line removed this am. Dressings currently CDI. All questions addressed. Will continue to monitor

## 2020-07-22 NOTE — HOSPITAL COURSE
S/p RFA PVI with Dr. Hawley on 7/20/20. Procedure complicated by large left SFA hematoma. Vascular surgery repaired on 7/21/20. Now with MUSHTAQ drains and wound vac.

## 2020-07-22 NOTE — PLAN OF CARE
Ms Alvarado was seen at bedside. Pain improving. Still having some sharp intermittent pain in left groin when ambulating. Drain in place. Monitor output. Will need therapeutic anticoagulation once ok from vascular surgery perspective.     Vinh Griffin MD PGY8

## 2020-07-22 NOTE — PHYSICIAN QUERY
PT Name: Vicky Alvarado  MR #: [id number]    HEMATOLOGY CLARIFICATION      CDS/: Florinda Nesbitt               Contact information:[email or web address]    This form is a permanent document in the medical record.      Query Date: July 22, 2020    By submitting this query, we are merely seeking further clarification of documentation. Please utilize your independent clinical judgment when addressing the question(s) below.    The Medical Record contains the following:   Indicators  Supporting Clinical Findings Location in Medical Record    Anemia documented     x H&H Hgb=9.2->8.2->8.4  Hct=27.9->26.4->26.2 Lab 7-21 to 7-22    BP                    HR      GI bleeding documented     x Acute bleeding (Non GI site) Description of Procedure: 4-5 units of blood evacuated from L Thigh    Estimated Blood Loss:  100cc new blood loss   Vascular surgery op note 7-21   x Transfusion(s) She received 4 units of pRBC and 2 of FFP   Op note 7-21    Acute/Chronic illness      Treatments     x Other She underwent an ablation today with Cardiology. Post procedure she was noted to have a hematoma in her left groin.      MUSHTAQ drains in place with 120 and 480 cc of serosanguinous output yesterday Vascular surgery note 7-21              Cardiology note 7-22     Provider, please specify diagnosis or diagnoses associated with above clinical findings.   [ x  ] Acute blood loss anemia    [   ] Acute blood loss anemia expected post-operatively    [   ] Other Hematological Diagnosis (please specify): _________________   [   ] Clinically Undetermined     Present on admission (POA) status:   [   ] Yes (Y)                          [  ] Clinically Undetermined (W)  [ x  ] No (N)                            [   ] Documentation insufficient to determine if condition is POA (U)          Please document in your progress notes daily for the duration of treatment, until resolved, and include in your discharge summary.

## 2020-07-22 NOTE — PT/OT/SLP EVAL
"Physical Therapy Evaluation    Patient Name:  Vicky Alvarado   MRN:  [id number]    Recommendations:     Discharge Recommendations:  home, home health PT   Discharge Equipment Recommendations: walker, rolling, other (see comments)(Bariatric Rolling Walker)   Barriers to discharge: Decreased caregiver support    Assessment:     Vicky Alvarado is a 55 y.o. female admitted with a medical diagnosis of Hematoma of groin.  She presents with the following impairments/functional limitations:  weakness, impaired endurance, impaired self care skills, impaired functional mobilty, gait instability, impaired balance, decreased lower extremity function, pain Patient pleasant and motivated to participate in therapy with good activity tolerance. Primarily limited by pain and drains. Patient SBA for all mobility including Gait of 90' with RW. Patient is safe to return home once medically ready and would benefit from home health physical therapy in order to improve safety and independence in the home environment.     Rehab Prognosis: Good; patient would benefit from acute skilled PT services to address these deficits and reach maximum level of function.    Recent Surgery: Procedure(s) (LRB):  EXPLORATION, ARTERY, FEMORAL (Left) 1 Day Post-Op    Plan:     During this hospitalization, patient to be seen 3 x/week to address the identified rehab impairments via gait training, therapeutic activities, therapeutic exercises, neuromuscular re-education and progress toward the following goals:    · Plan of Care Expires:  08/21/20    Subjective     Chief Complaint: L groin pain  Patient/Family Comments/goals: "I live alone but I have a great support system" "I do not want blood clots. I have been moving my legs as much as I can and getting up to go to the bathroom"  Pain/Comfort:  · Pain Rating 1: 5/10(at rest)  · Location - Side 1: Left  · Location 1: groin  · Pain Addressed 1: Pre-medicate for activity, Reposition, Distraction, " Cessation of Activity  · Pain Rating Post-Intervention 1: 8/10(with movement)    Patients cultural, spiritual, Hinduism conflicts given the current situation: no    Living Environment:  Lives alone in [address] with no MELI. Cares for 2 cats. Has friends and family that live nearby.  Prior to admission, patients level of function was independent.  Equipment used at home: none.  DME owned (not currently used): none.  Upon discharge, patient will have assistance from friends and family.    Objective:     Communicated with nurse prior to session.  Patient found with bed in chair position with branch catheter, wound vac, hemovac  upon PT entry to room.    General Precautions: Standard, fall   Orthopedic Precautions:N/A   Braces: N/A     Exams:  · RLE ROM: WFL  · RLE Strength: WFL  · LLE ROM: WFL  · LLE Strength: WFL    Functional Mobility:  · Bed Mobility:     · Scooting: stand by assistance for line management and increased time due to pain  · Supine to Sit: stand by assistance for line management and increased time due to pain  · Transfers:     · Sit to Stand:  stand by assistance with no AD line management and increased time due to pain  · Bed to Chair: stand by assistance with  rolling walker  using  Step Transfer. Minimal v/t cues for walker management. Patient demonstrated good understanding and slow safe descent.   · Gait: 90',  SBA with RW. Decreased gait speed, increased MITZY, decreased step length, decreased paulette, antalgic gait, flexed posture.    Therapeutic Activities and Exercises:   Patient educated on role of PT in the hospital. Pt educated on plan and goals with physical therapy. Pt educated on importance of OOB activity to decrease the risks associated with bed rest. Patient educated on diaphragmatic breathing for pain management. Instruction provided for safety and technique for gait and transfers with RW.  Patient educated on recommendation of RW for gait for increased support due to pain and to improve  gait stability.     AM-PAC 6 CLICK MOBILITY  Total Score:18     Patient left up in chair with all lines intact, call button in reach and niece present.    GOALS:   Multidisciplinary Problems     Physical Therapy Goals        Problem: Physical Therapy Goal    Goal Priority Disciplines Outcome Goal Variances Interventions   Physical Therapy Goal     PT, PT/OT Ongoing, Progressing     Description: Goals to be met by: 20     Patient will increase functional independence with mobility by performin. Supine to sit with Modified [place]  2. Sit to supine with Modified [place]  3. Sit to stand transfer with Modified [place]  4. Bed to chair transfer with Modified [place] using Rolling Walker or no AD  5. Gait  x 250 feet with Modified [place] using Rolling Walker or no AD  6. Lower extremity exercise program x30 reps per handout, with independence                     History:     Past Medical History:   Diagnosis Date    Atrial fibrillation     cardioversion    Avascular necrosis     L hand    Depression     GERD (gastroesophageal reflux disease)     Hx of psychiatric care     Hypertension     Psychiatric problem        Past Surgical History:   Procedure Laterality Date    CARPAL TUNNEL RELEASE Right 6/10/2020    Procedure: RELEASE, CARPAL TUNNEL - RIGHT;  Surgeon: Adelaida Hall MD;  Location: [facility];  Service: Orthopedics;  Laterality: Right;  GENERAL AND REGIONAL    EXPLORATION OF FEMORAL ARTERY Left 2020    Procedure: EXPLORATION, ARTERY, FEMORAL;  Surgeon: SEMAJ Márquez III, MD;  [address];  Service: Peripheral Vascular;  Laterality: Left;  1. Urgent Direct repair, L SFA branch laceration    FOOT SURGERY      TREATMENT OF CARDIAC ARRHYTHMIA N/A 2020    Procedure: CARDIOVERSION;  Surgeon: Gabriel Hawley MD;  Location: [facility] EP LAB;  Service: Cardiology;  Laterality: N/A;  af, demi, dccv, anes, mb, 345       Time Tracking:     PT  Received On: 07/22/20  PT Start Time: 0930     PT Stop Time: 0954  PT Total Time (min): 24 min     Billable Minutes: Evaluation 10 and Therapeutic Activity 14      Libra Green, LUCITA  07/22/2020

## 2020-07-23 LAB
ANION GAP SERPL CALC-SCNC: 8 MMOL/L (ref 8–16)
BASOPHILS # BLD AUTO: 0.06 K/UL (ref 0–0.2)
BASOPHILS NFR BLD: 0.7 % (ref 0–1.9)
BLD PROD TYP BPU: NORMAL
BLD PROD TYP BPU: NORMAL
BLOOD UNIT EXPIRATION DATE: NORMAL
BLOOD UNIT EXPIRATION DATE: NORMAL
BLOOD UNIT TYPE CODE: 5100
BLOOD UNIT TYPE CODE: 9500
BLOOD UNIT TYPE: NORMAL
BLOOD UNIT TYPE: NORMAL
BUN SERPL-MCNC: 18 MG/DL (ref 6–20)
CALCIUM SERPL-MCNC: 8.5 MG/DL (ref 8.7–10.5)
CHLORIDE SERPL-SCNC: 99 MMOL/L (ref 95–110)
CO2 SERPL-SCNC: 28 MMOL/L (ref 23–29)
CODING SYSTEM: NORMAL
CODING SYSTEM: NORMAL
CREAT SERPL-MCNC: 0.8 MG/DL (ref 0.5–1.4)
DIFFERENTIAL METHOD: ABNORMAL
DISPENSE STATUS: NORMAL
DISPENSE STATUS: NORMAL
EOSINOPHIL # BLD AUTO: 0.4 K/UL (ref 0–0.5)
EOSINOPHIL NFR BLD: 4.3 % (ref 0–8)
ERYTHROCYTE [DISTWIDTH] IN BLOOD BY AUTOMATED COUNT: 14.9 % (ref 11.5–14.5)
EST. GFR  (AFRICAN AMERICAN): >60 ML/MIN/1.73 M^2
EST. GFR  (NON AFRICAN AMERICAN): >60 ML/MIN/1.73 M^2
GLUCOSE SERPL-MCNC: 127 MG/DL (ref 70–110)
HCT VFR BLD AUTO: 26.1 % (ref 37–48.5)
HGB BLD-MCNC: 8.2 G/DL (ref 12–16)
IMM GRANULOCYTES # BLD AUTO: 0.08 K/UL (ref 0–0.04)
IMM GRANULOCYTES NFR BLD AUTO: 0.9 % (ref 0–0.5)
LYMPHOCYTES # BLD AUTO: 1.8 K/UL (ref 1–4.8)
LYMPHOCYTES NFR BLD: 19.7 % (ref 18–48)
MCH RBC QN AUTO: 28.3 PG (ref 27–31)
MCHC RBC AUTO-ENTMCNC: 31.4 G/DL (ref 32–36)
MCV RBC AUTO: 90 FL (ref 82–98)
MONOCYTES # BLD AUTO: 1 K/UL (ref 0.3–1)
MONOCYTES NFR BLD: 11.3 % (ref 4–15)
NEUTROPHILS # BLD AUTO: 5.7 K/UL (ref 1.8–7.7)
NEUTROPHILS NFR BLD: 63.1 % (ref 38–73)
NRBC BLD-RTO: 0 /100 WBC
NUM UNITS TRANS FFP: NORMAL
NUM UNITS TRANS FFP: NORMAL
PLATELET # BLD AUTO: 146 K/UL (ref 150–350)
PMV BLD AUTO: 11.4 FL (ref 9.2–12.9)
POTASSIUM SERPL-SCNC: 4 MMOL/L (ref 3.5–5.1)
RBC # BLD AUTO: 2.9 M/UL (ref 4–5.4)
SODIUM SERPL-SCNC: 135 MMOL/L (ref 136–145)
WBC # BLD AUTO: 9.08 K/UL (ref 3.9–12.7)

## 2020-07-23 PROCEDURE — 99900035 HC TECH TIME PER 15 MIN (STAT)

## 2020-07-23 PROCEDURE — 97116 GAIT TRAINING THERAPY: CPT | Mod: CQ

## 2020-07-23 PROCEDURE — 25000003 PHARM REV CODE 250: Performed by: INTERNAL MEDICINE

## 2020-07-23 PROCEDURE — 25000003 PHARM REV CODE 250: Performed by: NURSE PRACTITIONER

## 2020-07-23 PROCEDURE — 85025 COMPLETE CBC W/AUTO DIFF WBC: CPT

## 2020-07-23 PROCEDURE — 20600001 HC STEP DOWN PRIVATE ROOM

## 2020-07-23 PROCEDURE — 63600175 PHARM REV CODE 636 W HCPCS: Performed by: NURSE PRACTITIONER

## 2020-07-23 PROCEDURE — 25000003 PHARM REV CODE 250: Performed by: STUDENT IN AN ORGANIZED HEALTH CARE EDUCATION/TRAINING PROGRAM

## 2020-07-23 PROCEDURE — 36415 COLL VENOUS BLD VENIPUNCTURE: CPT

## 2020-07-23 PROCEDURE — 80048 BASIC METABOLIC PNL TOTAL CA: CPT

## 2020-07-23 PROCEDURE — 94761 N-INVAS EAR/PLS OXIMETRY MLT: CPT

## 2020-07-23 RX ORDER — POLYETHYLENE GLYCOL 3350 17 G/17G
17 POWDER, FOR SOLUTION ORAL 3 TIMES DAILY PRN
Status: DISCONTINUED | OUTPATIENT
Start: 2020-07-23 | End: 2020-07-24 | Stop reason: HOSPADM

## 2020-07-23 RX ADMIN — METOPROLOL SUCCINATE 50 MG: 50 TABLET, EXTENDED RELEASE ORAL at 09:07

## 2020-07-23 RX ADMIN — OXYCODONE HYDROCHLORIDE 5 MG: 5 TABLET ORAL at 01:07

## 2020-07-23 RX ADMIN — LISINOPRIL 40 MG: 20 TABLET ORAL at 08:07

## 2020-07-23 RX ADMIN — APIXABAN 5 MG: 5 TABLET, FILM COATED ORAL at 09:07

## 2020-07-23 RX ADMIN — OXYCODONE HYDROCHLORIDE 5 MG: 5 TABLET ORAL at 09:07

## 2020-07-23 RX ADMIN — POLYETHYLENE GLYCOL 3350 17 G: 17 POWDER, FOR SOLUTION ORAL at 05:07

## 2020-07-23 RX ADMIN — APIXABAN 5 MG: 5 TABLET, FILM COATED ORAL at 08:07

## 2020-07-23 RX ADMIN — ONDANSETRON 4 MG: 2 INJECTION INTRAMUSCULAR; INTRAVENOUS at 01:07

## 2020-07-23 RX ADMIN — CALCIUM CARBONATE (ANTACID) CHEW TAB 500 MG 500 MG: 500 CHEW TAB at 12:07

## 2020-07-23 RX ADMIN — TRAZODONE HYDROCHLORIDE 100 MG: 100 TABLET ORAL at 09:07

## 2020-07-23 RX ADMIN — OXYCODONE HYDROCHLORIDE 5 MG: 5 TABLET ORAL at 03:07

## 2020-07-23 RX ADMIN — DULOXETINE HYDROCHLORIDE 120 MG: 60 CAPSULE, DELAYED RELEASE ORAL at 08:07

## 2020-07-23 RX ADMIN — DOCUSATE SODIUM 50MG AND SENNOSIDES 8.6MG 1 TABLET: 8.6; 5 TABLET, FILM COATED ORAL at 09:07

## 2020-07-23 RX ADMIN — METOPROLOL SUCCINATE 50 MG: 50 TABLET, EXTENDED RELEASE ORAL at 08:07

## 2020-07-23 RX ADMIN — PANTOPRAZOLE SODIUM 40 MG: 40 TABLET, DELAYED RELEASE ORAL at 08:07

## 2020-07-23 NOTE — PROGRESS NOTES
[facility]  Cardiology  Progress Note    Patient Name: Vicky Alvarado  MRN: [id number]  Admission Date: 7/20/2020  Hospital Length of Stay: 3 days  Code Status: Full Code   Attending Physician: Jonathan Hill MD   Primary Care Physician: Gordy Cordero MD  Expected Discharge Date: 7/24/2020  Principal Problem:Hematoma of groin    Subjective:     Hospital Course:   S/p RFA PVI with Dr. Hawley on 7/20/20. Procedure complicated by large left SFA hematoma. Vascular surgery repaired on 7/21/20. Now with MUSHTAQ drains and wound vac.    Interval History:   No acute events overnight. Hgb stable around 8. Pain and swelling improving. MUSHTAQ drains output slowing down to approx 90cc/day yesterday. Restarted eliquis today. Plan for discharge tomorrow if cleared by vascular surgery.     Review of Systems   Constitution: Positive for malaise/fatigue. Negative for decreased appetite, fever and weight gain.   HENT: Negative for congestion, hearing loss and nosebleeds.    Eyes: Negative for visual disturbance.   Cardiovascular: Positive for dyspnea on exertion and leg swelling. Negative for chest pain, irregular heartbeat, palpitations and syncope.   Respiratory: Negative for cough, shortness of breath and sleep disturbances due to breathing.    Endocrine: Negative for cold intolerance and heat intolerance.   Hematologic/Lymphatic: Negative for bleeding problem. Does not bruise/bleed easily.   Gastrointestinal: Negative for bloating, abdominal pain, change in bowel habit and heartburn.   Neurological: Negative for dizziness, loss of balance and numbness.   Psychiatric/Behavioral: Negative for altered mental status. The patient is not nervous/anxious.      Objective:     Vital Signs (Most Recent):  Temp: 97.9 °F (36.6 °C) (07/23/20 0732)  Pulse: 63 (07/23/20 1058)  Resp: 16 (07/23/20 0858)  BP: (!) 100/50 (07/23/20 0902)  SpO2: 98 % (07/23/20 0732) Vital Signs (24h Range):  Temp:  [97.1 °F (36.2 °C)-98.5 °F  (36.9 °C)] 97.9 °F (36.6 °C)  Pulse:  [60-74] 63  Resp:  [12-18] 16  SpO2:  [94 %-99 %] 98 %  BP: ()/(46-60) 100/50     Weight: (!) 143 kg (315 lb 4.1 oz)  Body mass index is 50.88 kg/m².     SpO2: 98 %  O2 Device (Oxygen Therapy): room air      Intake/Output Summary (Last 24 hours) at 7/23/2020 1141  Last data filed at 7/23/2020 0902  Gross per 24 hour   Intake 1200 ml   Output 385 ml   Net 815 ml       Lines/Drains/Airways     Drain                 Closed/Suction Drain 07/21/20 0423 Left;Proximal;Anterior Thigh Accordion 2 days         Closed/Suction Drain 07/21/20 0423 Left;Ventral Thigh Accordion 2 days          Peripheral Intravenous Line                 Peripheral IV - Single Lumen 07/20/20 1016 18 G Left Antecubital 3 days                Physical Exam   Constitutional: She is oriented to person, place, and time. Vital signs are normal. She appears well-developed and well-nourished.   HENT:   Head: Normocephalic.   Eyes: Pupils are equal, round, and reactive to light.   Neck: Normal range of motion. Neck supple. No JVD present. Carotid bruit is not present.   Cardiovascular: Normal rate, regular rhythm, S1 normal, S2 normal, normal heart sounds and intact distal pulses. PMI is not displaced. Exam reveals no gallop and no friction rub.   No murmur heard.  Pulses:       Radial pulses are 2+ on the right side and 2+ on the left side.        Dorsalis pedis pulses are 2+ on the right side and 2+ on the left side.   Pulmonary/Chest: Effort normal and breath sounds normal. No accessory muscle usage. She has no decreased breath sounds. She has no wheezes.   Abdominal: Soft. Normal appearance and bowel sounds are normal. She exhibits no distension, no fluid wave and no ascites. There is no hepatosplenomegaly. There is no abdominal tenderness.   Musculoskeletal: Normal range of motion.         General: No edema.      Comments: Left groin with inferior medial and lateral medial MUSHTAQ drains with serosanguinous output.  Wound vac placed superior left groin. Left PT and and DP pulses 2+. Swelling improving    Neurological: She is alert and oriented to person, place, and time. She has normal strength.   Skin: Skin is warm, dry and intact. No ecchymosis and no rash noted. No erythema.   Psychiatric: She has a normal mood and affect. Her speech is normal and behavior is normal. Judgment and thought content normal. Cognition and memory are normal.   Vitals reviewed.      Significant Labs:   CMP   Recent Labs   Lab 07/22/20  0409 07/23/20  0543   * 135*   K 3.5 4.0   CL 98 99   CO2 32* 28   * 127*   BUN 19 18   CREATININE 0.8 0.8   CALCIUM 7.8* 8.5*   ANIONGAP 5* 8   ESTGFRAFRICA >60.0 >60.0   EGFRNONAA >60.0 >60.0   , CBC   Recent Labs   Lab 07/22/20  0854 07/22/20  1557 07/23/20  1011   WBC 10.87 10.51 9.08   HGB 8.4* 8.8* 8.2*   HCT 26.2* 27.9* 26.1*    163 146*    and All pertinent lab results from the last 24 hours have been reviewed.        Assessment and Plan:       * Hematoma of groin  S/p RFA PVI with Dr. Hawley on 7/20/20. Procedure complicated by large left SFA hematoma. Vascular surgery repaired on 7/21/20. Now with MUSHTAQ drains and wound vac.      - US negative for arterial bleeding.   - MUSHTAQ drains in place   - Pain and swelling improving. MUSHTAQ output slowing.   - Hgb 9.2-->8.2-->8.2  - restarted eliquis  - CBC q8hr  - pain control with prn oxycodone  - vascular surgery and EP following     Depression  - continue duloxetine     Atrial fibrillation  S/p PVi on 7/20    - curently in NSR  - restarted eliquis   - telemetry     Chronic diastolic heart failure  Currently euvolemic    - continue home HCTZ 50 qd, lisinopril 40mg, and toprol 50mg BID    TTE on 5/2020  · Normal left ventricular systolic function. The estimated ejection fraction is 60%.  · Severe left atrial enlargement.  · Grade I (mild) left ventricular diastolic dysfunction consistent with impaired relaxation.  · Normal right ventricular systolic  function.  · Mild right atrial enlargement.  · Mild aortic valve stenosis. Aortic valve area is 1.83 cm2; peak velocity is 2.42 m/s; mean gradient is 12 mmHg.  · Mild tricuspid regurgitation.  · Normal central venous pressure (3 mmHg).  · The estimated PA systolic pressure is 22 mmHg.    Hypertension  - continue lisinopril and HCTZ  - BP low normal         VTE Risk Mitigation (From admission, onward)         Ordered     apixaban tablet 5 mg  2 times daily      07/23/20 0719     Place sequential compression device  Until discontinued      07/22/20 0915                Pedro Luis Coyle MD  Cardiology  [facility]

## 2020-07-23 NOTE — PT/OT/SLP PROGRESS
"Physical Therapy Treatment    Patient Name:  Vicky Alvarado   MRN:  [id number]    Recommendations:     Discharge Recommendations:  home health PT   Discharge Equipment Recommendations: walker, rolling, shower chair(bariatric RW)   Barriers to discharge: None    Assessment:     Vicky Alvarado is a 55 y.o. female admitted with a medical diagnosis of Hematoma of groin.  She presents with the following impairments/functional limitations:  weakness, impaired endurance, impaired self care skills, impaired functional mobilty, gait instability, impaired balance, decreased lower extremity function Patient tolerated treatment well focusing on bed mobility, transfers, gait and therex. Patient will continue to improve with skilled physical therapy services in order to return to functional baseline.    Rehab Prognosis: Good; patient would benefit from acute skilled PT services to address these deficits and reach maximum level of function.    Recent Surgery: Procedure(s) (LRB):  EXPLORATION, ARTERY, FEMORAL (Left) 2 Days Post-Op    Plan:     During this hospitalization, patient to be seen 3 x/week to address the identified rehab impairments via gait training, therapeutic activities, therapeutic exercises, neuromuscular re-education and progress toward the following goals:    · Plan of Care Expires:  08/21/20    Subjective     Chief Complaint: none  Patient/Family Comments/goals: "I'm ready to go home"  Pain/Comfort:  · Pain Rating 1: other (see comments)("a little" not described)  · Pain Rating Post-Intervention 1: other (see comments)(none mentioned)      Objective:     Communicated with nursing prior to session.  Patient found HOB elevated with wound vac, hemovac upon PT entry to room.     General Precautions: Standard, fall   Orthopedic Precautions:N/A   Braces:       Functional Mobility:  · Bed Mobility:     · Scooting: supervision  · Supine to Sit: supervision  · Transfers:     · Sit to Stand:  supervision with " rolling walker  · Toilet transfer: step to transfer RW S  · Gait: 200 SBA slow paulette vcs for breathing tech  · Balance: sitting EOB S, static stand RW S      AM-PAC 6 CLICK MOBILITY  Turning over in bed (including adjusting bedclothes, sheets and blankets)?: 3  Sitting down on and standing up from a chair with arms (e.g., wheelchair, bedside commode, etc.): 3  Moving from lying on back to sitting on the side of the bed?: 3  Moving to and from a bed to a chair (including a wheelchair)?: 3  Need to walk in hospital room?: 3  Climbing 3-5 steps with a railing?: 3  Basic Mobility Total Score: 18       Therapeutic Activities and Exercises:   Patient educated on importance of increased time out of bed and ALEX throughout the day including AP, LAQ, HF, abd/add, GS rest breaks as needed  discussed/educated on home environment safety, DME needs and HHPT pt verbalized understanding all questions answered within PTA scope of practice and all other questions directed to appropriate persons    Patient left on toilet with call button in reach and sister present..    GOALS:   Multidisciplinary Problems     Physical Therapy Goals        Problem: Physical Therapy Goal    Goal Priority Disciplines Outcome Goal Variances Interventions   Physical Therapy Goal     PT, PT/OT Ongoing, Progressing     Description: Goals to be met by: 20     Patient will increase functional independence with mobility by performin. Supine to sit with Modified [place]  2. Sit to supine with Modified [place]  3. Sit to stand transfer with Modified [place]  4. Bed to chair transfer with Modified [place] using Rolling Walker or no AD  5. Gait  x 250 feet with Modified [place] using Rolling Walker or no AD  6. Lower extremity exercise program x30 reps per handout, with independence                     Time Tracking:     PT Received On: 20  PT Start Time: 1146     PT Stop Time: 1200  PT Total Time (min): 14 min      Billable Minutes: Gait Training 14    Treatment Type: Treatment  PT/PTA: PTA     PTA Visit Number: 1     Esperanza Bates, RACHNA  07/23/2020

## 2020-07-23 NOTE — PROGRESS NOTES
[facility]  Vascular Surgery  Progress Note    Patient Name: Vicky Alvarado  MRN: [id number]  Admission Date: 7/20/2020  Primary Care Provider: Gordy Cordero MD    Subjective:     Interval History: NAEO. She was feeling well this morning and reports improved pain, mobility, and decreased drain output.      Post-Op Info:  Procedure(s) (LRB):  EXPLORATION, ARTERY, FEMORAL (Left)   2 Days Post-Op       Medications:  Continuous Infusions:  Scheduled Meds:   DULoxetine  120 mg Oral Daily    enoxaparin  40 mg Subcutaneous Q12H    hydroCHLOROthiazide  50 mg Oral Daily    lisinopriL  40 mg Oral Daily    metoprolol succinate  50 mg Oral BID    pantoprazole  40 mg Oral Daily     PRN Meds:acetaminophen, calcium carbonate, melatonin, ondansetron, oxyCODONE, promethazine (PHENERGAN) IVPB, senna-docusate 8.6-50 mg, sodium chloride 0.9%, traZODone     Objective:     Vital Signs (Most Recent):  Temp: 98.5 °F (36.9 °C) (07/23/20 0520)  Pulse: 74 (07/23/20 0520)  Resp: 16 (07/23/20 0520)  BP: (!) 107/54 (07/23/20 0520)  SpO2: 97 % (07/23/20 0520) Vital Signs (24h Range):  Temp:  [97.1 °F (36.2 °C)-98.5 °F (36.9 °C)] 98.5 °F (36.9 °C)  Pulse:  [60-74] 74  Resp:  [12-18] 16  SpO2:  [95 %-99 %] 97 %  BP: ()/(46-60) 107/54         Physical Exam  Vitals signs and nursing note reviewed.   Constitutional:       Appearance: Normal appearance. She is obese.   HENT:      Head: Normocephalic and atraumatic.   Pulmonary:      Effort: Pulmonary effort is normal.      Breath sounds: Normal breath sounds.   Musculoskeletal:      Comments: Left groin with bruising, wound vac, and drain in place.   Left foot with 2+ DP pulses    Skin:     Capillary Refill: Capillary refill takes less than 2 seconds.   Neurological:      General: No focal deficit present.      Mental Status: She is alert and oriented to person, place, and time.   Psychiatric:         Mood and Affect: Mood normal.         Behavior: Behavior  normal.       Output by Drain (mL) 07/21/20 0701 - 07/21/20 1900 07/21/20 1901 - 07/22/20 0700 07/22/20 0701 - 07/22/20 1900 07/22/20 1901 - 07/23/20 0700 07/23/20 0701 - 07/23/20 0857        Closed/Suction Drain 07/21/20 0423 Left;Proximal;Anterior Thigh Accordion 125 120 95          Closed/Suction Drain 07/21/20 0423 Left;Ventral Thigh Accordion 125 480 80           Significant Labs:  BMP Pending      Significant Diagnostics:  I have reviewed all pertinent imaging results/findings within the past 24 hours.    Assessment/Plan:     * Hematoma of groin  56 y/o F with an expanding left groin hematoma following cardiac ablation. Now s/p primary repair of SFA branch laceration 7/21 with drains x2 in place. Drains continue to put out significant drainage, although the content is clear.    - POD 2 s/p SFA branch laceration, doing well  - Decreasing pain and improved mobility  - Continue with drains, record output and strip every few hours. Drain output decreasing   - Continue skin wound vac  - Okay to start oral anticoagulation today  - Will monitor drain output over the next 24-48 hours after starting AC, possible discharge if no further significant bleeding occurs  - Remaining care per primary.         Eladia Curiel MD  Vascular Surgery  [facility]

## 2020-07-23 NOTE — PLAN OF CARE
Pt free of injuries/falls/trauma. A&O x4  No complaints of SOB or chest pain.   Incision pain controlled with PRN medications  Wound vacs in place, tubing stripped 3 times during shift.   Plan of care reviewed with pt, understanding verbalized. No questions at this time.   Bed in low position, wheels locked, call light in reach.  Will continue to monitor.

## 2020-07-23 NOTE — SUBJECTIVE & OBJECTIVE
Medications:  Continuous Infusions:  Scheduled Meds:   DULoxetine  120 mg Oral Daily    enoxaparin  40 mg Subcutaneous Q12H    hydroCHLOROthiazide  50 mg Oral Daily    lisinopriL  40 mg Oral Daily    metoprolol succinate  50 mg Oral BID    pantoprazole  40 mg Oral Daily     PRN Meds:acetaminophen, calcium carbonate, melatonin, ondansetron, oxyCODONE, promethazine (PHENERGAN) IVPB, senna-docusate 8.6-50 mg, sodium chloride 0.9%, traZODone     Objective:     Vital Signs (Most Recent):  Temp: 98.5 °F (36.9 °C) (07/23/20 0520)  Pulse: 74 (07/23/20 0520)  Resp: 16 (07/23/20 0520)  BP: (!) 107/54 (07/23/20 0520)  SpO2: 97 % (07/23/20 0520) Vital Signs (24h Range):  Temp:  [97.1 °F (36.2 °C)-98.5 °F (36.9 °C)] 98.5 °F (36.9 °C)  Pulse:  [60-74] 74  Resp:  [12-18] 16  SpO2:  [95 %-99 %] 97 %  BP: ()/(46-60) 107/54         Physical Exam  Vitals signs and nursing note reviewed.   Constitutional:       Appearance: Normal appearance. She is obese.   HENT:      Head: Normocephalic and atraumatic.   Pulmonary:      Effort: Pulmonary effort is normal.      Breath sounds: Normal breath sounds.   Musculoskeletal:      Comments: Left groin with bruising, wound vac, and drain in place.   Left foot with 2+ DP pulses    Skin:     Capillary Refill: Capillary refill takes less than 2 seconds.   Neurological:      General: No focal deficit present.      Mental Status: She is alert and oriented to person, place, and time.   Psychiatric:         Mood and Affect: Mood normal.         Behavior: Behavior normal.         Significant Labs:  BMP Pending      Significant Diagnostics:  I have reviewed all pertinent imaging results/findings within the past 24 hours.

## 2020-07-23 NOTE — PLAN OF CARE
Ms Alvarado was seen at bedside. Pain is better. Restarted on apixaban this am. Plan to monitor drain output now that anticoagulation has been resumed. Tele reviewed. Sinus rhythm.     Vinh Griffin MD PGY8

## 2020-07-23 NOTE — ASSESSMENT & PLAN NOTE
S/p RFA PVI with Dr. Hawley on 7/20/20. Procedure complicated by large left SFA hematoma. Vascular surgery repaired on 7/21/20. Now with MUSHTAQ drains and wound vac.      - US negative for arterial bleeding.   - MUSHTAQ drains in place   - Pain and swelling improving. MUSHTAQ output slowing.   - Hgb 9.2-->8.2-->8.2  - restarted eliquis  - CBC q8hr  - pain control with prn oxycodone  - vascular surgery and EP following

## 2020-07-23 NOTE — SUBJECTIVE & OBJECTIVE
Interval History:   No acute events overnight. Hgb stable around 8. Pain and swelling improving. MUSHTAQ drains output slowing down to approx 90cc/day yesterday. Restarted eliquis today. Plan for discharge tomorrow if cleared by vascular surgery.     Review of Systems   Constitution: Positive for malaise/fatigue. Negative for decreased appetite, fever and weight gain.   HENT: Negative for congestion, hearing loss and nosebleeds.    Eyes: Negative for visual disturbance.   Cardiovascular: Positive for dyspnea on exertion and leg swelling. Negative for chest pain, irregular heartbeat, palpitations and syncope.   Respiratory: Negative for cough, shortness of breath and sleep disturbances due to breathing.    Endocrine: Negative for cold intolerance and heat intolerance.   Hematologic/Lymphatic: Negative for bleeding problem. Does not bruise/bleed easily.   Gastrointestinal: Negative for bloating, abdominal pain, change in bowel habit and heartburn.   Neurological: Negative for dizziness, loss of balance and numbness.   Psychiatric/Behavioral: Negative for altered mental status. The patient is not nervous/anxious.      Objective:     Vital Signs (Most Recent):  Temp: 97.9 °F (36.6 °C) (07/23/20 0732)  Pulse: 63 (07/23/20 1058)  Resp: 16 (07/23/20 0858)  BP: (!) 100/50 (07/23/20 0902)  SpO2: 98 % (07/23/20 0732) Vital Signs (24h Range):  Temp:  [97.1 °F (36.2 °C)-98.5 °F (36.9 °C)] 97.9 °F (36.6 °C)  Pulse:  [60-74] 63  Resp:  [12-18] 16  SpO2:  [94 %-99 %] 98 %  BP: ()/(46-60) 100/50     Weight: (!) 143 kg (315 lb 4.1 oz)  Body mass index is 50.88 kg/m².     SpO2: 98 %  O2 Device (Oxygen Therapy): room air      Intake/Output Summary (Last 24 hours) at 7/23/2020 1141  Last data filed at 7/23/2020 0902  Gross per 24 hour   Intake 1200 ml   Output 385 ml   Net 815 ml       Lines/Drains/Airways     Drain                 Closed/Suction Drain 07/21/20 0423 Left;Proximal;Anterior Thigh Accordion 2 days         Closed/Suction  Drain 07/21/20 0423 Left;Ventral Thigh Accordion 2 days          Peripheral Intravenous Line                 Peripheral IV - Single Lumen 07/20/20 1016 18 G Left Antecubital 3 days                Physical Exam   Constitutional: She is oriented to person, place, and time. Vital signs are normal. She appears well-developed and well-nourished.   HENT:   Head: Normocephalic.   Eyes: Pupils are equal, round, and reactive to light.   Neck: Normal range of motion. Neck supple. No JVD present. Carotid bruit is not present.   Cardiovascular: Normal rate, regular rhythm, S1 normal, S2 normal, normal heart sounds and intact distal pulses. PMI is not displaced. Exam reveals no gallop and no friction rub.   No murmur heard.  Pulses:       Radial pulses are 2+ on the right side and 2+ on the left side.        Dorsalis pedis pulses are 2+ on the right side and 2+ on the left side.   Pulmonary/Chest: Effort normal and breath sounds normal. No accessory muscle usage. She has no decreased breath sounds. She has no wheezes.   Abdominal: Soft. Normal appearance and bowel sounds are normal. She exhibits no distension, no fluid wave and no ascites. There is no hepatosplenomegaly. There is no abdominal tenderness.   Musculoskeletal: Normal range of motion.         General: No edema.      Comments: Left groin with inferior medial and lateral medial MUSHTAQ drains with serosanguinous output. Wound vac placed superior left groin. Left PT and and DP pulses 2+. Swelling improving    Neurological: She is alert and oriented to person, place, and time. She has normal strength.   Skin: Skin is warm, dry and intact. No ecchymosis and no rash noted. No erythema.   Psychiatric: She has a normal mood and affect. Her speech is normal and behavior is normal. Judgment and thought content normal. Cognition and memory are normal.   Vitals reviewed.      Significant Labs:   CMP   Recent Labs   Lab 07/22/20  0409 07/23/20  0543   * 135*   K 3.5 4.0   CL 98  99   CO2 32* 28   * 127*   BUN 19 18   CREATININE 0.8 0.8   CALCIUM 7.8* 8.5*   ANIONGAP 5* 8   ESTGFRAFRICA >60.0 >60.0   EGFRNONAA >60.0 >60.0   , CBC   Recent Labs   Lab 07/22/20  0854 07/22/20  1557 07/23/20  1011   WBC 10.87 10.51 9.08   HGB 8.4* 8.8* 8.2*   HCT 26.2* 27.9* 26.1*    163 146*    and All pertinent lab results from the last 24 hours have been reviewed.

## 2020-07-23 NOTE — PLAN OF CARE
Plan of care discussed with patient. Patient is free of fall/trauma/injury, intermittently getting up in chair and walking with PT. Denies CP, SOB. Pain controled by PRN medications. Accordian drains x3 stripped as ordered and I&O's documented. Prevena in place as well. Dressings currently CDI. All questions addressed. Will continue to monitor

## 2020-07-23 NOTE — ASSESSMENT & PLAN NOTE
56 y/o F with an expanding left groin hematoma following cardiac ablation. Now s/p primary repair of SFA branch laceration 7/21 with drains x2 in place. Drains continue to put out significant drainage, although the content is clear.    - POD 2 s/p SFA branch laceration, doing well  - Decreasing pain and improved mobility  - Continue with drains, record output and strip every few hours. Drain output decreasing   - Continue skin wound vac  - Okay to start oral anticoagulation today  - Will monitor drain output over the next 24-48 hours after starting AC, possible discharge if no further significant bleeding occurs  - Remaining care per primary.

## 2020-07-24 VITALS
DIASTOLIC BLOOD PRESSURE: 59 MMHG | SYSTOLIC BLOOD PRESSURE: 119 MMHG | WEIGHT: 293 LBS | BODY MASS INDEX: 47.09 KG/M2 | HEIGHT: 66 IN | RESPIRATION RATE: 16 BRPM | HEART RATE: 63 BPM | TEMPERATURE: 98 F | OXYGEN SATURATION: 97 %

## 2020-07-24 LAB
ANION GAP SERPL CALC-SCNC: 10 MMOL/L (ref 8–16)
BASOPHILS # BLD AUTO: 0.06 K/UL (ref 0–0.2)
BASOPHILS NFR BLD: 0.7 % (ref 0–1.9)
BLD PROD TYP BPU: NORMAL
BLD PROD TYP BPU: NORMAL
BLOOD UNIT EXPIRATION DATE: NORMAL
BLOOD UNIT EXPIRATION DATE: NORMAL
BLOOD UNIT TYPE CODE: 5100
BLOOD UNIT TYPE CODE: 5100
BLOOD UNIT TYPE: NORMAL
BLOOD UNIT TYPE: NORMAL
BUN SERPL-MCNC: 15 MG/DL (ref 6–20)
CALCIUM SERPL-MCNC: 8.7 MG/DL (ref 8.7–10.5)
CHLORIDE SERPL-SCNC: 102 MMOL/L (ref 95–110)
CO2 SERPL-SCNC: 24 MMOL/L (ref 23–29)
CODING SYSTEM: NORMAL
CODING SYSTEM: NORMAL
CREAT SERPL-MCNC: 0.8 MG/DL (ref 0.5–1.4)
DIFFERENTIAL METHOD: ABNORMAL
DISPENSE STATUS: NORMAL
DISPENSE STATUS: NORMAL
EOSINOPHIL # BLD AUTO: 0.5 K/UL (ref 0–0.5)
EOSINOPHIL NFR BLD: 5.3 % (ref 0–8)
ERYTHROCYTE [DISTWIDTH] IN BLOOD BY AUTOMATED COUNT: 15.1 % (ref 11.5–14.5)
EST. GFR  (AFRICAN AMERICAN): >60 ML/MIN/1.73 M^2
EST. GFR  (NON AFRICAN AMERICAN): >60 ML/MIN/1.73 M^2
GLUCOSE SERPL-MCNC: 155 MG/DL (ref 70–110)
HCT VFR BLD AUTO: 27.7 % (ref 37–48.5)
HGB BLD-MCNC: 8.6 G/DL (ref 12–16)
IMM GRANULOCYTES # BLD AUTO: 0.08 K/UL (ref 0–0.04)
IMM GRANULOCYTES NFR BLD AUTO: 0.9 % (ref 0–0.5)
LYMPHOCYTES # BLD AUTO: 1.6 K/UL (ref 1–4.8)
LYMPHOCYTES NFR BLD: 18.3 % (ref 18–48)
MCH RBC QN AUTO: 28.7 PG (ref 27–31)
MCHC RBC AUTO-ENTMCNC: 31 G/DL (ref 32–36)
MCV RBC AUTO: 92 FL (ref 82–98)
MONOCYTES # BLD AUTO: 0.9 K/UL (ref 0.3–1)
MONOCYTES NFR BLD: 10.2 % (ref 4–15)
NEUTROPHILS # BLD AUTO: 5.8 K/UL (ref 1.8–7.7)
NEUTROPHILS NFR BLD: 64.6 % (ref 38–73)
NRBC BLD-RTO: 0 /100 WBC
PLATELET # BLD AUTO: 157 K/UL (ref 150–350)
PMV BLD AUTO: 11.7 FL (ref 9.2–12.9)
POTASSIUM SERPL-SCNC: 4.5 MMOL/L (ref 3.5–5.1)
RBC # BLD AUTO: 3 M/UL (ref 4–5.4)
SODIUM SERPL-SCNC: 136 MMOL/L (ref 136–145)
TRANS ERYTHROCYTES VOL PATIENT: NORMAL ML
TRANS ERYTHROCYTES VOL PATIENT: NORMAL ML
WBC # BLD AUTO: 8.92 K/UL (ref 3.9–12.7)

## 2020-07-24 PROCEDURE — 99239 PR HOSPITAL DISCHARGE DAY,>30 MIN: ICD-10-PCS | Mod: ,,, | Performed by: INTERNAL MEDICINE

## 2020-07-24 PROCEDURE — 25000003 PHARM REV CODE 250: Performed by: NURSE PRACTITIONER

## 2020-07-24 PROCEDURE — 80048 BASIC METABOLIC PNL TOTAL CA: CPT

## 2020-07-24 PROCEDURE — 99239 HOSP IP/OBS DSCHRG MGMT >30: CPT | Mod: ,,, | Performed by: INTERNAL MEDICINE

## 2020-07-24 PROCEDURE — 25000003 PHARM REV CODE 250: Performed by: INTERNAL MEDICINE

## 2020-07-24 PROCEDURE — 63600175 PHARM REV CODE 636 W HCPCS: Performed by: NURSE PRACTITIONER

## 2020-07-24 PROCEDURE — 85025 COMPLETE CBC W/AUTO DIFF WBC: CPT

## 2020-07-24 PROCEDURE — 25000003 PHARM REV CODE 250: Performed by: STUDENT IN AN ORGANIZED HEALTH CARE EDUCATION/TRAINING PROGRAM

## 2020-07-24 PROCEDURE — 36415 COLL VENOUS BLD VENIPUNCTURE: CPT

## 2020-07-24 PROCEDURE — 94761 N-INVAS EAR/PLS OXIMETRY MLT: CPT

## 2020-07-24 RX ORDER — HYDROCHLOROTHIAZIDE 50 MG/1
TABLET ORAL
Qty: 90 TABLET | Refills: 5
Start: 2020-07-24 | End: 2020-10-19 | Stop reason: SDUPTHER

## 2020-07-24 RX ORDER — LISINOPRIL 40 MG/1
TABLET ORAL
Qty: 90 TABLET | Refills: 5
Start: 2020-07-24 | End: 2020-10-19 | Stop reason: SDUPTHER

## 2020-07-24 RX ORDER — TRAMADOL HYDROCHLORIDE 50 MG/1
50 TABLET ORAL EVERY 8 HOURS PRN
Qty: 15 TABLET | Refills: 0 | Status: SHIPPED | OUTPATIENT
Start: 2020-07-24 | End: 2020-10-29

## 2020-07-24 RX ORDER — ONDANSETRON 4 MG/1
4 TABLET, FILM COATED ORAL EVERY 8 HOURS PRN
Qty: 30 TABLET | Refills: 0 | Status: ON HOLD | OUTPATIENT
Start: 2020-07-24 | End: 2020-08-07 | Stop reason: HOSPADM

## 2020-07-24 RX ORDER — HYDROCHLOROTHIAZIDE 50 MG/1
50 TABLET ORAL DAILY
Qty: 90 TABLET | Refills: 5 | Status: SHIPPED | OUTPATIENT
Start: 2020-07-24 | End: 2020-07-24 | Stop reason: SDUPTHER

## 2020-07-24 RX ORDER — LISINOPRIL 40 MG/1
40 TABLET ORAL DAILY
Qty: 90 TABLET | Refills: 5 | Status: SHIPPED | OUTPATIENT
Start: 2020-07-24 | End: 2020-07-24 | Stop reason: SDUPTHER

## 2020-07-24 RX ADMIN — PANTOPRAZOLE SODIUM 40 MG: 40 TABLET, DELAYED RELEASE ORAL at 08:07

## 2020-07-24 RX ADMIN — METOPROLOL SUCCINATE 50 MG: 50 TABLET, EXTENDED RELEASE ORAL at 08:07

## 2020-07-24 RX ADMIN — APIXABAN 5 MG: 5 TABLET, FILM COATED ORAL at 08:07

## 2020-07-24 RX ADMIN — OXYCODONE HYDROCHLORIDE 5 MG: 5 TABLET ORAL at 03:07

## 2020-07-24 RX ADMIN — ONDANSETRON 4 MG: 2 INJECTION INTRAMUSCULAR; INTRAVENOUS at 03:07

## 2020-07-24 RX ADMIN — OXYCODONE HYDROCHLORIDE 5 MG: 5 TABLET ORAL at 08:07

## 2020-07-24 RX ADMIN — DULOXETINE HYDROCHLORIDE 120 MG: 60 CAPSULE, DELAYED RELEASE ORAL at 08:07

## 2020-07-24 RX ADMIN — CALCIUM CARBONATE (ANTACID) CHEW TAB 500 MG 500 MG: 500 CHEW TAB at 03:07

## 2020-07-24 NOTE — ASSESSMENT & PLAN NOTE
54 y/o F with an expanding left groin hematoma following cardiac ablation. Now s/p primary repair of SFA branch laceration 7/21 with drains x2 in place. Drains continue to put out significant drainage, although the content is clear.    - POD 3 s/p SFA branch laceration, doing well  - Decreasing pain and improved mobility  - okay to d/c today with anticoagulation. Keep drains and wound vac in place. Must have patient educated on stripping drains frequently and recording drain output from each drain twice daily. We will see her in clinic next Tuesday to remove drains and wound vac.   - okay to continue AC  - Remaining care per primary.

## 2020-07-24 NOTE — PROGRESS NOTES
Pt called RN into room and notified RN that pt accidentally pulled one drain out. RN verified that drain was removed. MD Morin notified.

## 2020-07-24 NOTE — PLAN OF CARE
[facility]    HOME HEALTH ORDERS  FACE TO FACE ENCOUNTER    Patient Name: Vicky Alvarado  YOB: 1965    PCP: Gordy Cordero MD   PCP Address: [address] / [place] [address]  PCP Phone Number: [phone]  PCP Fax: [phone]    Encounter Date: 07/24/2020    Admit to Home Health    Diagnoses:  Active Hospital Problems    Diagnosis  POA    *Hematoma of groin [S30.1XXA]  No    Hematoma [T14.8XXA]  Yes    Depression [F32.9]  Yes    Atrial fibrillation [I48.91]  Yes    ERENDIRA (obstructive sleep apnea) [G47.33]  Yes    Atrial Fibrillation (paroxysmal) [I48.0]  Yes    Chronic diastolic heart failure [I50.22]  Yes    Hypertension [I10]  Yes    Body mass index (BMI) 45.0-49.9, adult [Z68.42]  Not Applicable      Resolved Hospital Problems   No resolved problems to display.       Future Appointments   Date Time Provider Department Center   8/27/2020  8:30 AM Adelaida Hall MD [facility] HAND [facility]   9/8/2020  2:30 PM Gunjan Doty MD [facility] SLEEP [facility]   9/22/2020  3:30 PM Debora Harding MD [facility]           I have seen and examined this patient face to face today. My clinical findings that support the need for the home health skilled services and home bound status are the following:  Patient with medication mismanagement issues requiring home bound status as evidenced by  groin hematoma.    Allergies:Review of patient's allergies indicates:  No Known Allergies    Diet: low fat, low cholesterol diet and 2 gram sodium diet    Activities: activity as tolerated    Nursing:   SN to complete comprehensive assessment including routine vital signs. Instruct on disease process and s/s of complications to report to MD. Review/verify medication list sent home with the patient at time of discharge  and instruct patient/caregiver as needed. Frequency may be adjusted depending on start of care date.    Notify MD if SBP > 160 or < 90;  DBP > 90 or < 50; HR > 120 or < 50; Temp > 101; Other:      CONSULTS:    Aide to provide assistance with personal care, ADLs, and vital signs.      MISCELLANEOUS CARE:  Wound Care Orders:  yes:  Vascular Wound:  Location: left groin    Wound Vac:   Location:  Left groin            Dressing changes every Monday, Wednesday and Friday.        ET Consult  MUSHTAQ drains in left groin  - Dressing changes every Monday, Wednesday and Friday.  - Please record daily outputs from drains, including which drain, and report results to vascular clinic follow up  .  Medications: Review discharge medications with patient and family and provide education.      I certify that this patient is confined to her home and needs intermittent skilled nursing care.       Vicky Alvarado   Home Medication Instructions JESSICA:[id number]    Printed on:07/24/20 0924   Medication Information                      apixaban (ELIQUIS) 5 mg Tab  Take 1 tablet (5 mg total) by mouth 2 (two) times daily.             b complex vitamins capsule  Take 1 capsule by mouth once daily.             DULoxetine (CYMBALTA) 60 MG capsule  Take 2 capsules (120 mg total) by mouth once daily.             gluc-shikha-[facility]#9-P-gtbq-reymundo-bor 750 mg-644 mg- 30 mg-1 mg Tab  Take 1 tablet by mouth once daily.              hydroCHLOROthiazide (HYDRODIURIL) 50 MG tablet  Please hold (do not take) until seen by Primary Care Physician             krill/om-3/dha/epa/phospho/ast (MEGARED OMEGA-3 KRILL OIL ORAL)               lisinopriL (PRINIVIL,ZESTRIL) 40 MG tablet  Please hold (do not take) until seen by Primary Care Physician             MELATONIN ORAL  Take 1-2 tablets by mouth nightly as needed (Sleep).             metoprolol succinate (TOPROL-XL) 50 MG 24 hr tablet  Take 1 tablet (50 mg total) by mouth 2 (two) times daily.             multivitamin (THERAGRAN) per tablet  Take 1 tablet by mouth once daily.             omeprazole (PRILOSEC) 20 MG capsule  TAKE 1 CAPSULE BY MOUTH  ONCE DAILY             traZODone (DESYREL) 100 MG tablet  Take 1 tablet (100 mg total) by mouth nightly as needed for Insomnia.

## 2020-07-24 NOTE — DISCHARGE SUMMARY
[facility]  Cardiology  Discharge Summary      Patient Name: Vicky Alvarado  MRN: [id number]  Admission Date: 7/20/2020  Hospital Length of Stay: 4 days  Discharge Date and Time:  07/24/2020 9:29 AM  Attending Physician: Sun Hahn MD    Discharging Provider: Pedro Luis Coyle MD  Primary Care Physician: Gordy Cordero MD    HPI:   55 year old female with history MVP, HTN, depression, hx opioid dependence and afib who was admitted to CCU for SFA laceration during PCI. S/p RFA PVI with Dr. Hawley on 7/20/20. Procedure complicated by large left SFA hematoma. Vascular surgery consulted and on 7/21/20 patient underwent left SFA exploration with direct repair of L SFA branch laceration.  Left groin site with wound vac and MUSHTAQ drain with minimal sanguinous drainage. Of note patient is on Eliquis at home and per Cardiology instructions took a dose the night prior to the procedure and held the AM of the procedure.    Per EP:  Admitted 1/28-1/30/20 for progressively worsening SOB and palpitations. She was diagnosed with and treated for ADHF and AFib with RVR with diruesis and REENA+DCCV on 1/29/20.  TTE noted EF 40% with hypokinesis of all walls except septal.  PET stress 03/2020 without evidence of ischemia. Following hospitalization, Ms. Alvarado reported ongoing SOB and palpitations.  Review of last several EKGs SR/SB. She was continued on eliquis 5mg BID for stroke prophylaxis and metoprolol succinate 50 mg BID for HR control.   Given her symptomatic pAF with arrhythmia induced CM, RFA PVI was elected as best course of treatment.    Procedure(s) (LRB):  EXPLORATION, ARTERY, FEMORAL (Left)     Indwelling Lines/Drains at time of discharge:  Lines/Drains/Airways     Drain                 Closed/Suction Drain 07/21/20 0423 Left;Proximal;Anterior Thigh Accordion 3 days         Closed/Suction Drain 07/21/20 0423 Left;Ventral Thigh Accordion 3 days                Hospital Course:  Patient  admitted to CCU for monitoring after PVI complication. Presented on 7/20 for RFA PVI with Dr. Hawley. Procedure complicated by large left SFA hematoma from a SFA branch laceration. Laceration not noted during PVI and devloped hematoma later that night. Vascular surgery consulted and on 7/21/20 patient underwent left SFA exploration with direct repair of L SFA branch laceration. She had 2 MUSHTAQ drains placed and a wound vac. H/H remained stable and US with no evidence of continued bleeding. Vascular cleared for DC on 7/24 with drains, wound vac, and home health. Will have her follow up with EP and vascular as outpatient.     Vital Signs (Most Recent):  Temp: 97.8 °F (36.6 °C) (07/24/20 0800)  Pulse: 68 (07/24/20 0800)  Resp: 16 (07/24/20 0833)  BP: 116/64 (07/24/20 0800)  SpO2: 95 % (07/24/20 0800) Vital Signs (24h Range):  Temp:  [97.4 °F (36.3 °C)-98.7 °F (37.1 °C)] 97.8 °F (36.6 °C)  Pulse:  [56-77] 68  Resp:  [16-17] 16  SpO2:  [92 %-99 %] 95 %  BP: ()/(45-64) 116/64     Weight: (!) 143 kg (315 lb 4.1 oz)  Body mass index is 50.88 kg/m².     SpO2: 95 %  O2 Device (Oxygen Therapy): room air      Intake/Output Summary (Last 24 hours) at 7/24/2020 0930  Last data filed at 7/24/2020 0400  Gross per 24 hour   Intake 480 ml   Output 288 ml   Net 192 ml       Lines/Drains/Airways     Drain                 Closed/Suction Drain 07/21/20 0423 Left;Proximal;Anterior Thigh Accordion 3 days         Closed/Suction Drain 07/21/20 0423 Left;Ventral Thigh Accordion 3 days          Peripheral Intravenous Line                 Peripheral IV - Single Lumen 07/20/20 1016 18 G Left Antecubital 3 days                Physical Exam   Constitutional: She is oriented to person, place, and time. Vital signs are normal. She appears well-developed and well-nourished.   HENT:   Head: Normocephalic.   Eyes: Pupils are equal, round, and reactive to light.   Neck: Normal range of motion. Neck supple. No JVD present. Carotid bruit is not  present.   Cardiovascular: Normal rate, regular rhythm, S1 normal, S2 normal, normal heart sounds and intact distal pulses. PMI is not displaced. Exam reveals no gallop and no friction rub.   No murmur heard.  Pulses:       Radial pulses are 2+ on the right side and 2+ on the left side.        Dorsalis pedis pulses are 2+ on the right side and 2+ on the left side.   Pulmonary/Chest: Effort normal and breath sounds normal. No accessory muscle usage. She has no decreased breath sounds. She has no wheezes.   Abdominal: Soft. Normal appearance and bowel sounds are normal. She exhibits no distension, no fluid wave and no ascites. There is no hepatosplenomegaly. There is no abdominal tenderness.   Musculoskeletal: Normal range of motion.         General: No edema.      Comments: Left groin with inferior medial and lateral medial MUSHTAQ drains with serosanguinous output. Wound vac placed superior left groin. All drains and vac c/d//i. Left PT and and DP pulses 2+. Swelling improving    Neurological: She is alert and oriented to person, place, and time. She has normal strength.   Skin: Skin is warm, dry and intact. No ecchymosis and no rash noted. No erythema.   Psychiatric: She has a normal mood and affect. Her speech is normal and behavior is normal. Judgment and thought content normal. Cognition and memory are normal.   Vitals reviewed.      Consults:   Consults (From admission, onward)        Status Ordering Provider     Inpatient consult to Vascular Surgery  Once     Provider:  (Not yet assigned)    Completed GIUSEPPE [facility]              Pending Diagnostic Studies:     None          Final Active Diagnoses:    Diagnosis Date Noted POA    PRINCIPAL PROBLEM:  Hematoma of groin [S30.1XXA] 07/21/2020 No    Hematoma [T14.8XXA] 07/21/2020 Yes    Depression [F32.9] 07/21/2020 Yes    Atrial fibrillation [I48.91] 07/20/2020 Yes    ERENDIRA (obstructive sleep apnea) [G47.33]  Yes    Atrial Fibrillation (paroxysmal) [I48.0]  02/26/2020 Yes    Chronic diastolic heart failure [I50.22] 01/28/2020 Yes    Hypertension [I10] 10/12/2015 Yes    Body mass index (BMI) 45.0-49.9, adult [Z68.42] 10/12/2015 Not Applicable      Problems Resolved During this Admission:     No new Assessment & Plan notes have been filed under this hospital service since the last note was generated.  Service: Cardiology      Discharged Condition: good    Disposition: Home or Self Care    Follow Up:    Patient Instructions:      Ambulatory referral/consult to Electrophysiology   Standing Status: Future   Referral Priority: Routine Referral Type: Consultation   Referral Reason: Specialty Services Required   Requested Specialty: Electrophysiology   Number of Visits Requested: 1     Ambulatory referral/consult to Vascular Surgery   Standing Status: Future   Referral Priority: Routine Referral Type: Consultation   Referral Reason: Specialty Services Required   Requested Specialty: Vascular Surgery   Number of Visits Requested: 1     Medications:  Reconciled Home Medications:      Medication List      START taking these medications    hydroCHLOROthiazide 50 MG tablet  Commonly known as: HYDRODIURIL  Please hold (do not take) until seen by Primary Care Physician     lisinopriL 40 MG tablet  Commonly known as: PRINIVIL,ZESTRIL  Please hold (do not take) until seen by Primary Care Physician        CONTINUE taking these medications    b complex vitamins capsule  Take 1 capsule by mouth once daily.     DULoxetine 60 MG capsule  Commonly known as: CYMBALTA  Take 2 capsules (120 mg total) by mouth once daily.     ELIQUIS 5 mg Tab  Generic drug: apixaban  Take 1 tablet (5 mg total) by mouth 2 (two) times daily.     dfqbrgin-htvp-wpz3-C-radha-bosw 750 mg-644 mg- 30 mg-1 mg Tab  Take 1 tablet by mouth once daily.     MEGARED OMEGA-3 KRILL OIL ORAL     MELATONIN ORAL  Take 1-2 tablets by mouth nightly as needed (Sleep).     metoprolol succinate 50 MG 24 hr tablet  Commonly known as:  TOPROL-XL  Take 1 tablet (50 mg total) by mouth 2 (two) times daily.     multivitamin per tablet  Commonly known as: THERAGRAN  Take 1 tablet by mouth once daily.     omeprazole 20 MG capsule  Commonly known as: PRILOSEC  TAKE 1 CAPSULE BY MOUTH ONCE DAILY     traZODone 100 MG tablet  Commonly known as: DESYREL  Take 1 tablet (100 mg total) by mouth nightly as needed for Insomnia.            Time spent on the discharge of patient: 35 minutes    Pedro Luis Carrilloor, MD  Cardiology  [facility]

## 2020-07-24 NOTE — NURSING
Pt discharged per MD orders.  Tele discontinued and returned to station.  IV discontinued; catheter tip intact x.  Medication list and prescriptions reviewed; prescriptions sent to pt preferred pharmacy and printed prescriptions provided.  Pt verbalizes understanding of all written and verbal discharge instructions.  Pt awaiting family/escort arrival and home equipment coming to bedside.  Will continue to monitor.

## 2020-07-24 NOTE — PLAN OF CARE
Plan of care discussed with patient. Patient is free of fall/trauma/injury. Denies CP, SOB. Pt has intermittent pain/discomfort to hematoma site, PRN medications given. Pt has 2 accordian drains to her left groin hematoma. All questions addressed. Will continue to monitor

## 2020-07-24 NOTE — PLAN OF CARE
07/24/20 0748   Discharge Reassessment   Assessment Type Discharge Planning Reassessment   Do you have any problems affording any of your prescribed medications? TBD   Discharge Plan A Home;Home Health

## 2020-07-24 NOTE — SUBJECTIVE & OBJECTIVE
Medications:  Continuous Infusions:  Scheduled Meds:   apixaban  5 mg Oral BID    DULoxetine  120 mg Oral Daily    hydroCHLOROthiazide  50 mg Oral Daily    lisinopriL  40 mg Oral Daily    metoprolol succinate  50 mg Oral BID    pantoprazole  40 mg Oral Daily     PRN Meds:acetaminophen, calcium carbonate, melatonin, ondansetron, oxyCODONE, polyethylene glycol, promethazine (PHENERGAN) IVPB, senna-docusate 8.6-50 mg, sodium chloride 0.9%, traZODone     Objective:     Vital Signs (Most Recent):  Temp: 98.2 °F (36.8 °C) (07/23/20 2126)  Pulse: 66 (07/23/20 2126)  Resp: 16 (07/23/20 2127)  BP: (!) 105/49 (07/23/20 2126)  SpO2: 99 % (07/23/20 2126) Vital Signs (24h Range):  Temp:  [97.4 °F (36.3 °C)-98.7 °F (37.1 °C)] 98.2 °F (36.8 °C)  Pulse:  [62-75] 66  Resp:  [16-18] 16  SpO2:  [94 %-99 %] 99 %  BP: ()/(46-54) 105/49     Date 07/23/20 0700 - 07/24/20 0659   Shift 1829-3909 2951-3320 2524-6002 24 Hour Total   INTAKE   P.O.  480  480   Shift Total(mL/kg)  480(3.4)  480(3.4)   OUTPUT   Drains 135 138  273   Shift Total(mL/kg) 135(0.9) 138(1)  273(1.9)   Weight (kg) 143 143 143 143       Physical Exam  Vitals signs and nursing note reviewed.   Constitutional:       Appearance: Normal appearance.   Musculoskeletal:      Comments: Moderate bruising to left groin with provena in place. Drains x2 thinning serosanguinous output.    Neurological:      General: No focal deficit present.      Mental Status: She is alert and oriented to person, place, and time.         Significant Labs:  CBC:   Recent Labs   Lab 07/24/20  0344   WBC 8.92   RBC 3.00*   HGB 8.6*   HCT 27.7*      MCV 92   MCH 28.7   MCHC 31.0*     CMP:   Recent Labs   Lab 07/24/20  0344   *   CALCIUM 8.7      K 4.5   CO2 24      BUN 15   CREATININE 0.8     Significant Diagnostics:  I have reviewed all pertinent imaging results/findings within the past 24 hours.

## 2020-07-24 NOTE — PROGRESS NOTES
[facility]  Vascular Surgery  Progress Note    Patient Name: Vicky Alvarado  MRN: [id number]  Admission Date: 7/20/2020  Primary Care Provider: Gordy Cordero MD    Subjective:     Interval History: no acute events overnight.     Post-Op Info:  Procedure(s) (LRB):  EXPLORATION, ARTERY, FEMORAL (Left)   3 Days Post-Op       Medications:  Continuous Infusions:  Scheduled Meds:   apixaban  5 mg Oral BID    DULoxetine  120 mg Oral Daily    hydroCHLOROthiazide  50 mg Oral Daily    lisinopriL  40 mg Oral Daily    metoprolol succinate  50 mg Oral BID    pantoprazole  40 mg Oral Daily     PRN Meds:acetaminophen, calcium carbonate, melatonin, ondansetron, oxyCODONE, polyethylene glycol, promethazine (PHENERGAN) IVPB, senna-docusate 8.6-50 mg, sodium chloride 0.9%, traZODone     Objective:     Vital Signs (Most Recent):  Temp: 98.2 °F (36.8 °C) (07/23/20 2126)  Pulse: 66 (07/23/20 2126)  Resp: 16 (07/23/20 2127)  BP: (!) 105/49 (07/23/20 2126)  SpO2: 99 % (07/23/20 2126) Vital Signs (24h Range):  Temp:  [97.4 °F (36.3 °C)-98.7 °F (37.1 °C)] 98.2 °F (36.8 °C)  Pulse:  [62-75] 66  Resp:  [16-18] 16  SpO2:  [94 %-99 %] 99 %  BP: ()/(46-54) 105/49     Date 07/23/20 0700 - 07/24/20 0659   Shift 5273-1448 5255-8260 8799-0486 24 Hour Total   INTAKE   P.O.  480  480   Shift Total(mL/kg)  480(3.4)  480(3.4)   OUTPUT   Drains 135 138  273   Shift Total(mL/kg) 135(0.9) 138(1)  273(1.9)   Weight (kg) 143 143 143 143       Physical Exam  Vitals signs and nursing note reviewed.   Constitutional:       Appearance: Normal appearance.   Musculoskeletal:      Comments: Moderate bruising to left groin with provena in place. Drains x2 thinning serosanguinous output.    Neurological:      General: No focal deficit present.      Mental Status: She is alert and oriented to person, place, and time.         Significant Labs:  CBC:   Recent Labs   Lab 07/24/20  0344   WBC 8.92   RBC 3.00*   HGB 8.6*   HCT 27.7*       MCV 92   MCH 28.7   MCHC 31.0*     CMP:   Recent Labs   Lab 07/24/20  0344   *   CALCIUM 8.7      K 4.5   CO2 24      BUN 15   CREATININE 0.8     Significant Diagnostics:  I have reviewed all pertinent imaging results/findings within the past 24 hours.    Assessment/Plan:     * Hematoma of groin  56 y/o F with an expanding left groin hematoma following cardiac ablation. Now s/p primary repair of SFA branch laceration 7/21 with drains x2 in place. Drains continue to put out significant drainage, although the content is clear.    - POD 3 s/p SFA branch laceration, doing well  - Decreasing pain and improved mobility  - okay to d/c today with anticoagulation. Keep drains and wound vac in place. Must have patient educated on stripping drains frequently and recording drain output from each drain twice daily. We will see her in clinic next Tuesday to remove drains and wound vac.   - okay to continue AC  - Remaining care per primary.         Elvin Wood MD  Vascular Surgery  [facility]

## 2020-07-24 NOTE — PLAN OF CARE
Briefly, Ms. Alvarado is a pleasant 55 year old with a history of afib who underwent PVI with RFA on 7/21/2020. Her post op course was complicated by a hematoma which required vascular intervention. Two drains were placed and she has been monitored in that inpatient setting without any acute issues. Hemoglobin has been stable.     She was seen and examined today and she continues to do well, planned for discharge today. Sister was present by her bedside.     Plan:  Patient is to continue on anticoagulation with apixaban 5mg bid as hematoma has stabilized and improved  Continue on protonix and beta blockers as well    F/up with Dr. Hawley as outpatient   Vascular follow up as outpt for drain removal

## 2020-07-24 NOTE — PLAN OF CARE
07/24/20 1520   Final Note   Assessment Type Final Discharge Note   Anticipated Discharge Disposition Home-Health   Hospital Follow Up  Appt(s) scheduled? Yes

## 2020-07-24 NOTE — PLAN OF CARE
Pt free of injuries/falls/trauma. A&O x4  No complaints of SOB or chest pain.   Pain and nausea controlled with PRN medications  Wound vacs in place, tubing stripped 3x during shift.   Plan of care reviewed with pt, understanding verbalized. No questions at this time.   Bed in low position, wheels locked, call light in reach.  Will continue to monitor.

## 2020-07-26 PROCEDURE — G0180 PR HOME HEALTH MD CERTIFICATION: ICD-10-PCS | Mod: ,,, | Performed by: INTERNAL MEDICINE

## 2020-07-26 PROCEDURE — G0180 MD CERTIFICATION HHA PATIENT: HCPCS | Mod: ,,, | Performed by: INTERNAL MEDICINE

## 2020-07-27 ENCOUNTER — PATIENT OUTREACH (OUTPATIENT)
Dept: ADMINISTRATIVE | Facility: CLINIC | Age: 55
End: 2020-07-27

## 2020-07-27 ENCOUNTER — TELEPHONE (OUTPATIENT)
Dept: VASCULAR SURGERY | Facility: CLINIC | Age: 55
End: 2020-07-27

## 2020-07-27 NOTE — PHYSICIAN QUERY
"PT Name: Vicky Alvarado  MR #: [id number]    Physician Query Form - Heart  Condition Clarification     CDS/: Florinda Nesbitt               Contact information:[email or web address]  This form is a permanent document in the medical record.     Query Date: July 27, 2020    By submitting this query, we are merely seeking further clarification of documentation. Please utilize your independent clinical judgment when addressing the question(s) below.    The medical record contains the following   Indicators     Supporting Clinical Findings Location in Medical Record    BNP      EF      Radiology findings     x Echo Results · · Normal left ventricular systolic function. The estimated ejection fraction is 60%.  · · Severe left atrial enlargement.  · · Grade I (mild) left ventricular diastolic dysfunction consistent with impaired relaxation.  · · Normal right ventricular systolic function.  · · Mild right atrial enlargement.  · · Mild aortic valve stenosis. Aortic valve area is 1.83 cm2; peak velocity is 2.42 m/s; mean gradient is 12 mmHg.  · · Mild tricuspid regurgitation.  · · Normal central venous pressure (3 mmHg).  · · The estimated PA systolic pressure is 22 mmHg. Echo 5-5-2020    "Ascites" documented      "SOB" or "NAPIER" documented      "Hypoxia" documented     x Heart Failure documented Chronic systolic heart failure  Currently euvolemic    Chronic diastolic heart failure H&P        Cardiology note 7-23 and  Discharge summary     "Edema" documented     x Diuretics/Meds Hydrochlorothiazide p.o. daily Mar 7-21 to 7-24    Treatment:     x Other:  REENA+DCCV on 1/29/20.  TTE noted EF 40% with hypokinesis of all walls except septal.  Discharge summary   Heart failure (HF) can be acute, chronic or both. It is generally further specificed as systolic, diastolic, or combined. Lastly, it is important to identify an underlying etiology if known or suspected.     Common clues to acute exacerbation:  " Rapidly progressive symptoms (w/in 2 weeks of presentation), using IV diuretics to treat, using supplemental O2, pulmonary edema on Xray, MI w/in 4 weeks, and/or BNP >500    Systolic Heart Failure: is defined as chart documentation of a left ventricular ejection fraction (LVEF) less than 40%     Diastolic Heart Failure: is defined as a left ventricular ejection fraction (LVEF) greater than 40%   +      Evidence of diastolic dysfunction on echocardiography OR    Right heart catheterization wedge pressure above 12 mm Hg OR    Left heart catheterization left ventricular end diastolic pressure 18 mm Hg or above.    References: *American Heart Association    The clinical guidelines noted below are only system guidelines, and do not replace the providers clinical judgment.     Provider, please specify the diagnosis associated with above clinical findings    Provider please further specify the conflicting chronic chf diagnosis.    [   ] Chronic Systolic Heart Failure - Pre-existing systolic HF diagnosis.  EF < 40%  without  acute HF symptoms documented    [ x  ] Chronic Diastolic Heart Failure - Pre-existing diastolic HF diagnosis.  EF > 40%  without  acute HF symptoms documented   [   ] Other Type of Heart Failure (please specify type):   [   ] Other (please specify):   [  ] Clinically Undetermined                           Please document in your progress notes daily for the duration of treatment until resolved and include in your discharge summary.

## 2020-07-27 NOTE — PROGRESS NOTES
Please forward this important TCC information to your provider in order to maximize the post discharge care delivery of this patient.    C3 nurse spoke with Vicky Alvarado  for a TCC post hospital discharge follow up call. The patient has a scheduled HOSFU appointment with Gordy Cordero MD on 07/31/2020 @ 1100.    Respectfully,  Gladys Elmore LPN    [facility]    [email or web address]       Please do not reply to this message, as this inbox is not routinely monitored.

## 2020-07-27 NOTE — PATIENT INSTRUCTIONS
Hematoma  A hematoma is a collection of blood trapped outside of a blood vessel. It is what we think of as a bruise or a contusion. It is usually seen under the skin as a black and blue spot on your arm or leg, or a bump on your head after an injury. It can be almost anywhere on or in your body. It can also occur in an internal organ where it can be more serious.  A hematoma is caused by an injury with damage to small blood vessels. This causes blood to leak into the tissues. Blood forms a pocket under the skin that swells and looks like a purplish patch.  Gradually the blood in the hematoma is absorbed back into the body. The swelling and pain of the hematoma will go away. This takes from 1 to 4 weeks, depending on the size of the hematoma. The skin over the hematoma may turn bluish then brown and yellow as the blood is dissolved and absorbed. Usually, this only takes a couple of weeks but can last months.  Home care  · Limit motion of the joints near the hematoma. If the hematoma is large and painful, avoid sports and other vigorous physical activity until the swelling and pain goes away.  · Apply an ice pack (ice cubes in a plastic bag, wrapped in a thin towel or a frozen bag of peas) over the injured area for 20 minutes every 1 to 2 hours the first day. Continue with ice packs 3 to 4 times a day for the next 2 days. Continue the use of ice packs for relief of pain and swelling as needed.  · If you need anything for pain, you can take acetaminophen, unless you were given a different pain medicine to use. Talk with your doctor before using this medicine if you have chronic liver or kidney disease. Also talk with your doctor if you have had a stomach ulcer or digestive tract bleeding, or are taking blood-thinner medicines.  Follow-up care  Follow up with your healthcare provider, or as advised. If X-rays or a CT scan were done, you will be notified if there is a change in the reading, especially if it affects  treatment.  When to seek medical advice  Call your healthcare provider right away f any of the following occur:  · Redness around the hematoma  · Increase in pain or warmth in the hematoma  · Increase in size of the hematoma  · Fever of 100.4ºF (38ºC) or higher, or as directed by your healthcare provider  · If the hematoma is on the arm or leg, watch for:  ¨ Increased swelling or pain in the extremity  ¨ Numbness or tingling or blue color of the hand or foot  Date Last Reviewed: 11/5/2015 © 2000-2017 [employer]. [address] [place] [address]. All rights reserved. This information is not intended as a substitute for professional medical care. Always follow your healthcare professional's instructions.        Hematoma  A hematoma is a collection of blood trapped outside of a blood vessel. It is what we think of as a bruise or a contusion. It is usually seen under the skin as a black and blue spot on your arm or leg, or a bump on your head after an injury. It can be almost anywhere on or in your body. It can also occur in an internal organ where it can be more serious.  A hematoma is caused by an injury with damage to small blood vessels. This causes blood to leak into the tissues. Blood forms a pocket under the skin that swells and looks like a purplish patch.  Gradually the blood in the hematoma is absorbed back into the body. The swelling and pain of the hematoma will go away. This takes from 1 to 4 weeks, depending on the size of the hematoma. The skin over the hematoma may turn bluish then brown and yellow as the blood is dissolved and absorbed. Usually, this only takes a couple of weeks but can last months.  Home care  · Limit motion of the joints near the hematoma. If the hematoma is large and painful, avoid sports and other vigorous physical activity until the swelling and pain goes away.  · Apply an ice pack (ice cubes in a plastic bag, wrapped in a thin towel or a frozen bag of  peas) over the injured area for 20 minutes every 1 to 2 hours the first day. Continue with ice packs 3 to 4 times a day for the next 2 days. Continue the use of ice packs for relief of pain and swelling as needed.  · If you need anything for pain, you can take acetaminophen, unless you were given a different pain medicine to use. Talk with your doctor before using this medicine if you have chronic liver or kidney disease. Also talk with your doctor if you have had a stomach ulcer or digestive tract bleeding, or are taking blood-thinner medicines.  Follow-up care  Follow up with your healthcare provider, or as advised. If X-rays or a CT scan were done, you will be notified if there is a change in the reading, especially if it affects treatment.  When to seek medical advice  Call your healthcare provider right away f any of the following occur:  · Redness around the hematoma  · Increase in pain or warmth in the hematoma  · Increase in size of the hematoma  · Fever of 100.4ºF (38ºC) or higher, or as directed by your healthcare provider  · If the hematoma is on the arm or leg, watch for:  ¨ Increased swelling or pain in the extremity  ¨ Numbness or tingling or blue color of the hand or foot  Date Last Reviewed: 11/5/2015  © 9225-8311 [employer]. [address] [place] [address], [place] [address]. All rights reserved. This information is not intended as a substitute for professional medical care. Always follow your healthcare professional's instructions.

## 2020-07-27 NOTE — PLAN OF CARE
CM received call from Pt. She never received DME and HHC knew nothing about the dressing changes. CM reminded pt that Surgery was going to DC Wound Vac tomorrow in clinic and would advise about dressing changes at that time. CM contacted Yajaira at [facility]. BSC on backorder and may receive today. RW will be delivered today and BSC as soon as available.

## 2020-07-27 NOTE — TELEPHONE ENCOUNTER
Contacted patient to schedule appt with Dr. Márquez tomorrow. States her drainage has increased and she is changing her clothes to keep the area dry. Appointment scheduled, pt verified.   ----- Message from Eladia Curiel MD sent at 7/24/2020  9:17 AM CDT -----  Hello,    Please have this patient come to clinic to see Dr. Márquez on this coming Tuesday, 7/28, for wound vac removal and drain check. She should be being discharged from hospital today. Thank you!     Eladia Curiel MD  Vascular Surgery

## 2020-07-28 ENCOUNTER — LAB VISIT (OUTPATIENT)
Dept: LAB | Facility: HOSPITAL | Age: 55
End: 2020-07-28
Payer: COMMERCIAL

## 2020-07-28 ENCOUNTER — OFFICE VISIT (OUTPATIENT)
Dept: VASCULAR SURGERY | Facility: CLINIC | Age: 55
End: 2020-07-28
Payer: COMMERCIAL

## 2020-07-28 VITALS
TEMPERATURE: 98 F | WEIGHT: 293 LBS | HEIGHT: 66 IN | SYSTOLIC BLOOD PRESSURE: 108 MMHG | HEART RATE: 69 BPM | DIASTOLIC BLOOD PRESSURE: 58 MMHG | BODY MASS INDEX: 47.09 KG/M2

## 2020-07-28 DIAGNOSIS — D64.9 ANEMIA, UNSPECIFIED TYPE: ICD-10-CM

## 2020-07-28 DIAGNOSIS — S30.1XXD HEMATOMA OF GROIN, SUBSEQUENT ENCOUNTER: ICD-10-CM

## 2020-07-28 DIAGNOSIS — E66.01 SEVERE OBESITY (BMI >= 40): Primary | ICD-10-CM

## 2020-07-28 LAB
BASOPHILS # BLD AUTO: 0.05 K/UL (ref 0–0.2)
BASOPHILS NFR BLD: 0.5 % (ref 0–1.9)
DIFFERENTIAL METHOD: ABNORMAL
EOSINOPHIL # BLD AUTO: 0.4 K/UL (ref 0–0.5)
EOSINOPHIL NFR BLD: 3.6 % (ref 0–8)
ERYTHROCYTE [DISTWIDTH] IN BLOOD BY AUTOMATED COUNT: 15.8 % (ref 11.5–14.5)
HCT VFR BLD AUTO: 29.9 % (ref 37–48.5)
HGB BLD-MCNC: 9 G/DL (ref 12–16)
IMM GRANULOCYTES # BLD AUTO: 0.24 K/UL (ref 0–0.04)
IMM GRANULOCYTES NFR BLD AUTO: 2.2 % (ref 0–0.5)
LYMPHOCYTES # BLD AUTO: 1.3 K/UL (ref 1–4.8)
LYMPHOCYTES NFR BLD: 12.2 % (ref 18–48)
MCH RBC QN AUTO: 28.1 PG (ref 27–31)
MCHC RBC AUTO-ENTMCNC: 30.1 G/DL (ref 32–36)
MCV RBC AUTO: 93 FL (ref 82–98)
MONOCYTES # BLD AUTO: 1.1 K/UL (ref 0.3–1)
MONOCYTES NFR BLD: 10 % (ref 4–15)
NEUTROPHILS # BLD AUTO: 7.8 K/UL (ref 1.8–7.7)
NEUTROPHILS NFR BLD: 71.5 % (ref 38–73)
NRBC BLD-RTO: 0 /100 WBC
PLATELET # BLD AUTO: 246 K/UL (ref 150–350)
PMV BLD AUTO: 10.4 FL (ref 9.2–12.9)
RBC # BLD AUTO: 3.2 M/UL (ref 4–5.4)
WBC # BLD AUTO: 10.9 K/UL (ref 3.9–12.7)

## 2020-07-28 PROCEDURE — 36415 COLL VENOUS BLD VENIPUNCTURE: CPT

## 2020-07-28 PROCEDURE — 99999 PR PBB SHADOW E&M-EST. PATIENT-LVL IV: CPT | Mod: PBBFAC,,, | Performed by: SURGERY

## 2020-07-28 PROCEDURE — 99024 PR POST-OP FOLLOW-UP VISIT: ICD-10-PCS | Mod: S$GLB,,, | Performed by: SURGERY

## 2020-07-28 PROCEDURE — 99999 PR PBB SHADOW E&M-EST. PATIENT-LVL IV: ICD-10-PCS | Mod: PBBFAC,,, | Performed by: SURGERY

## 2020-07-28 PROCEDURE — 99024 POSTOP FOLLOW-UP VISIT: CPT | Mod: S$GLB,,, | Performed by: SURGERY

## 2020-07-28 PROCEDURE — 85025 COMPLETE CBC W/AUTO DIFF WBC: CPT

## 2020-07-28 RX ORDER — HYDROCODONE BITARTRATE AND ACETAMINOPHEN 5; 325 MG/1; MG/1
1 TABLET ORAL EVERY 6 HOURS PRN
Qty: 20 TABLET | Refills: 0 | Status: SHIPPED | OUTPATIENT
Start: 2020-07-28 | End: 2020-08-28 | Stop reason: SDUPTHER

## 2020-07-28 NOTE — PROGRESS NOTES
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   She had undergone a recent ablation for atrial fibrillation.    This high risk left femoral incision was closed primarily with 2 large drains  And a Preventa VAC placed.  One drain was removed prior to her discharge postoperative day 3.    She now returns.  She has been having adequate pain control the tramadol.   She reports that the existing drain has put out over 300-500 cc per day over the weekend, Down to 100-200 cc per day which appears mostly serous    Past Medical History:   Diagnosis Date    Atrial fibrillation     cardioversion    Avascular necrosis     L hand    Depression     GERD (gastroesophageal reflux disease)     Hx of psychiatric care     Hypertension     Psychiatric problem        Past Surgical History:   Procedure Laterality Date    ABLATION OF ARRHYTHMOGENIC FOCUS FOR ATRIAL FIBRILLATION N/A 7/20/2020    Procedure: Ablation atrial fibrillation;  Surgeon: Gabriel Hawley MD;  Location: [facility] EP LAB;  Service: Cardiology;  Laterality: N/A;  afib, PVI, RFA, DEMI, SHADY, anes, MB, 3 Prep    CARPAL TUNNEL RELEASE Right 6/10/2020    Procedure: RELEASE, CARPAL TUNNEL - RIGHT;  Surgeon: Adelaida Hall MD;  Location: [facility];  Service: Orthopedics;  Laterality: Right;  GENERAL AND REGIONAL    EXPLORATION OF FEMORAL ARTERY Left 7/21/2020    Procedure: EXPLORATION, ARTERY, FEMORAL;  Surgeon: SEMAJ Márquez III, MD;  Location: [address];  Service: Peripheral Vascular;  Laterality: Left;  1. Urgent Direct repair, L SFA branch laceration    FOOT SURGERY      TREATMENT OF CARDIAC ARRHYTHMIA N/A 1/29/2020    Procedure: CARDIOVERSION;  Surgeon: Gabriel Hawley MD;  Location: [facility] EP LAB;  Service: Cardiology;  Laterality: N/A;  af, demi, dccv, fred, mb, 345         Current  Outpatient Medications:     apixaban (ELIQUIS) 5 mg Tab, Take 1 tablet (5 mg total) by mouth 2 (two) times daily., Disp: 60 tablet, Rfl: 6    b complex vitamins capsule, Take 1 capsule by mouth once daily., Disp: , Rfl:     DULoxetine (CYMBALTA) 60 MG capsule, Take 2 capsules (120 mg total) by mouth once daily., Disp: 180 capsule, Rfl: 3    gluc-shikha-msm#4-X-pboq-reymundo-bor 750 mg-644 mg- 30 mg-1 mg Tab, Take 1 tablet by mouth once daily. , Disp: , Rfl:     hydroCHLOROthiazide (HYDRODIURIL) 50 MG tablet, Please hold (do not take) until seen by Primary Care Physician, Disp: 90 tablet, Rfl: 5    krill/om-3/dha/epa/phospho/ast (MEGARED OMEGA-3 KRILL OIL ORAL), , Disp: , Rfl:     lisinopriL (PRINIVIL,ZESTRIL) 40 MG tablet, Please hold (do not take) until seen by Primary Care Physician, Disp: 90 tablet, Rfl: 5    MELATONIN ORAL, Take 1-2 tablets by mouth nightly as needed (Sleep)., Disp: , Rfl:     metoprolol succinate (TOPROL-XL) 50 MG 24 hr tablet, Take 1 tablet (50 mg total) by mouth 2 (two) times daily., Disp: 90 tablet, Rfl: 6    multivitamin (THERAGRAN) per tablet, Take 1 tablet by mouth once daily., Disp: , Rfl:     omeprazole (PRILOSEC) 20 MG capsule, TAKE 1 CAPSULE BY MOUTH ONCE DAILY, Disp: 90 capsule, Rfl: 3    ondansetron (ZOFRAN) 4 MG tablet, Take 1 tablet (4 mg total) by mouth every 8 (eight) hours as needed for Nausea., Disp: 30 tablet, Rfl: 0    traMADoL (ULTRAM) 50 mg tablet, Take 1 tablet (50 mg total) by mouth every 8 (eight) hours as needed for Pain., Disp: 15 tablet, Rfl: 0    traZODone (DESYREL) 100 MG tablet, Take 1 tablet (100 mg total) by mouth nightly as needed for Insomnia., Disp: 90 tablet, Rfl: 3  No current facility-administered medications for this visit.     Facility-Administered Medications Ordered in Other Visits:     0.9%  NaCl infusion, , Intravenous, Continuous, Derrick Dior MD, Stopped at 07/20/20 6465    Review of patient's allergies indicates:  No Known  "Allergies    Family History   Problem Relation Age of Onset    Cataracts Mother     Hypertension Mother     Thyroid disease Mother     Diabetes Father     Hypertension Father     Hypertension Sister     Hypertension Brother     Amblyopia Neg Hx     Blindness Neg Hx     Cancer Neg Hx     Glaucoma Neg Hx     Macular degeneration Neg Hx     Retinal detachment Neg Hx     Strabismus Neg Hx     Stroke Neg Hx        Social History     Tobacco Use    Smoking status: Former Smoker     Types: Cigarettes     Quit date: 2019     Years since quittin.0    Smokeless tobacco: Never Used   Substance Use Topics    Alcohol use: No    Drug use: No       REVIEW OF SYSTEMS:  General: No chills, fever, malaise, changes in weight  HEENT: No visual changes, difficulty hearing  Cardiovascular: No chest pain, palpitations, claudication  Pulmonary: No dyspnea, cough, wheezing  Gastrointestinal: No nausea, vomiting, diarrhea, constipation  Genitourinary: No dysuria, low urine output, hematuria  Endocrine: No polydipsia, polyphagia  Hematologic: No fatigue with exertion, pica, pallor  Musculoskeletal: No extremity or joint pain, no back pain, no difficulty with ambulation  Neurologic: no seizures, no headaches, no weakness  Psychiatric: no mood disturbance  3    PHYSICAL EXAM:   BP (!) 108/58 (BP Location: Right arm, Patient Position: Sitting, BP Method: Large (Automatic))   Pulse 69   Temp 98.4 °F (36.9 °C) (Oral)   Ht 5' 6" (1.676 m)   Wt (!) 141 kg (310 lb 13.6 oz)   LMP 2015   BMI 50.17 kg/m²   Constitutional:  Alert,   Well-appearing  In no distress.   Neurological: Normal speech  no focal findings  CN II - XII grossly intact.    Psychiatric: Mood and affect appropriate and symmetric.   HEENT: Normocephalic / atraumatic  PERRLA  Midline trachea  No scars across the neck   Cardiac: Regular rate and rhythm.   Pulmonary: Normal pulmonary effort.   Abdomen: Soft, not distended.     Skin: Warm and well " perfused.    Vascular:  The peak well perfused   Extremities/  Musculoskeletal: No edema.   Left leg demonstrates a well healing large vertical left femoral incision with interrupted nylon closure, seen after removal of the VAC.   There is some ecchymosis but no erythema     IMAGING:  None    IMPRESSION:  1 week status post urgent left SFA branch laceration repair evacuation huge hematoma.  This is a very high risk incision for secondary infection given its location and the patient's body habitus.  Fortunately, here at postop day 7 it looks perfect.    PLAN:     1. Dry dressing to left femoral area.  Okay to shower but not submerge.  I have underscored the importance of keeping this area dry, otherwise the risk of secondary infection and breakdown is substantial  2.  Norco 5 mg for pain  3.  Keep the drain in, keep daily log of output, follow-up in 1 week for possible drain removal    GUIDO Márquez III, MD, FACS  Professor and Chief, Vascular and Endovascular Surgery     Cc: GENTRY Hawley

## 2020-08-02 ENCOUNTER — HOSPITAL ENCOUNTER (INPATIENT)
Facility: HOSPITAL | Age: 55
LOS: 5 days | Discharge: HOME HEALTH | DRG: 902 | End: 2020-08-07
Attending: EMERGENCY MEDICINE | Admitting: SURGERY
Payer: COMMERCIAL

## 2020-08-02 ENCOUNTER — ANESTHESIA EVENT (OUTPATIENT)
Dept: SURGERY | Facility: HOSPITAL | Age: 55
DRG: 902 | End: 2020-08-02
Payer: COMMERCIAL

## 2020-08-02 DIAGNOSIS — T81.30XA WOUND DEHISCENCE: Primary | ICD-10-CM

## 2020-08-02 DIAGNOSIS — S30.1XXD HEMATOMA OF GROIN, SUBSEQUENT ENCOUNTER: ICD-10-CM

## 2020-08-02 DIAGNOSIS — I48.91 ATRIAL FIBRILLATION: ICD-10-CM

## 2020-08-02 LAB
ALBUMIN SERPL BCP-MCNC: 3.4 G/DL (ref 3.5–5.2)
ALP SERPL-CCNC: 91 U/L (ref 55–135)
ALT SERPL W/O P-5'-P-CCNC: 16 U/L (ref 10–44)
ANION GAP SERPL CALC-SCNC: 10 MMOL/L (ref 8–16)
AST SERPL-CCNC: 26 U/L (ref 10–40)
BASOPHILS # BLD AUTO: 0.06 K/UL (ref 0–0.2)
BASOPHILS NFR BLD: 0.7 % (ref 0–1.9)
BILIRUB SERPL-MCNC: 0.7 MG/DL (ref 0.1–1)
BILIRUB UR QL STRIP: NEGATIVE
BUN SERPL-MCNC: 11 MG/DL (ref 6–20)
CALCIUM SERPL-MCNC: 9.6 MG/DL (ref 8.7–10.5)
CHLORIDE SERPL-SCNC: 100 MMOL/L (ref 95–110)
CLARITY UR REFRACT.AUTO: CLEAR
CO2 SERPL-SCNC: 26 MMOL/L (ref 23–29)
COLOR UR AUTO: YELLOW
CREAT SERPL-MCNC: 0.9 MG/DL (ref 0.5–1.4)
DIFFERENTIAL METHOD: ABNORMAL
EOSINOPHIL # BLD AUTO: 0.2 K/UL (ref 0–0.5)
EOSINOPHIL NFR BLD: 2.3 % (ref 0–8)
ERYTHROCYTE [DISTWIDTH] IN BLOOD BY AUTOMATED COUNT: 15.4 % (ref 11.5–14.5)
EST. GFR  (AFRICAN AMERICAN): >60 ML/MIN/1.73 M^2
EST. GFR  (NON AFRICAN AMERICAN): >60 ML/MIN/1.73 M^2
GLUCOSE SERPL-MCNC: 119 MG/DL (ref 70–110)
GLUCOSE UR QL STRIP: NEGATIVE
HCT VFR BLD AUTO: 32.5 % (ref 37–48.5)
HGB BLD-MCNC: 9.8 G/DL (ref 12–16)
HGB UR QL STRIP: NEGATIVE
IMM GRANULOCYTES # BLD AUTO: 0.05 K/UL (ref 0–0.04)
IMM GRANULOCYTES NFR BLD AUTO: 0.6 % (ref 0–0.5)
KETONES UR QL STRIP: NEGATIVE
LACTATE SERPL-SCNC: 1.2 MMOL/L (ref 0.5–2.2)
LEUKOCYTE ESTERASE UR QL STRIP: NEGATIVE
LYMPHOCYTES # BLD AUTO: 1.2 K/UL (ref 1–4.8)
LYMPHOCYTES NFR BLD: 13.7 % (ref 18–48)
MCH RBC QN AUTO: 27.1 PG (ref 27–31)
MCHC RBC AUTO-ENTMCNC: 30.2 G/DL (ref 32–36)
MCV RBC AUTO: 90 FL (ref 82–98)
MONOCYTES # BLD AUTO: 0.7 K/UL (ref 0.3–1)
MONOCYTES NFR BLD: 8.6 % (ref 4–15)
NEUTROPHILS # BLD AUTO: 6.3 K/UL (ref 1.8–7.7)
NEUTROPHILS NFR BLD: 74.1 % (ref 38–73)
NITRITE UR QL STRIP: NEGATIVE
NRBC BLD-RTO: 0 /100 WBC
PH UR STRIP: 7 [PH] (ref 5–8)
PLATELET # BLD AUTO: 301 K/UL (ref 150–350)
PMV BLD AUTO: 10.8 FL (ref 9.2–12.9)
POTASSIUM SERPL-SCNC: 4.1 MMOL/L (ref 3.5–5.1)
PROT SERPL-MCNC: 8 G/DL (ref 6–8.4)
PROT UR QL STRIP: NEGATIVE
RBC # BLD AUTO: 3.62 M/UL (ref 4–5.4)
SARS-COV-2 RDRP RESP QL NAA+PROBE: NEGATIVE
SODIUM SERPL-SCNC: 136 MMOL/L (ref 136–145)
SP GR UR STRIP: 1.01 (ref 1–1.03)
URN SPEC COLLECT METH UR: NORMAL
WBC # BLD AUTO: 8.56 K/UL (ref 3.9–12.7)

## 2020-08-02 PROCEDURE — 25000003 PHARM REV CODE 250: Performed by: STUDENT IN AN ORGANIZED HEALTH CARE EDUCATION/TRAINING PROGRAM

## 2020-08-02 PROCEDURE — 20600001 HC STEP DOWN PRIVATE ROOM

## 2020-08-02 PROCEDURE — 96374 THER/PROPH/DIAG INJ IV PUSH: CPT

## 2020-08-02 PROCEDURE — 81003 URINALYSIS AUTO W/O SCOPE: CPT

## 2020-08-02 PROCEDURE — 99285 PR EMERGENCY DEPT VISIT,LEVEL V: ICD-10-PCS | Mod: ,,, | Performed by: EMERGENCY MEDICINE

## 2020-08-02 PROCEDURE — 93005 ELECTROCARDIOGRAM TRACING: CPT

## 2020-08-02 PROCEDURE — U0002 COVID-19 LAB TEST NON-CDC: HCPCS

## 2020-08-02 PROCEDURE — 99285 EMERGENCY DEPT VISIT HI MDM: CPT | Mod: 25

## 2020-08-02 PROCEDURE — 93010 EKG 12-LEAD: ICD-10-PCS | Mod: ,,, | Performed by: INTERNAL MEDICINE

## 2020-08-02 PROCEDURE — 80053 COMPREHEN METABOLIC PANEL: CPT

## 2020-08-02 PROCEDURE — 93010 ELECTROCARDIOGRAM REPORT: CPT | Mod: ,,, | Performed by: INTERNAL MEDICINE

## 2020-08-02 PROCEDURE — 63600175 PHARM REV CODE 636 W HCPCS: Performed by: EMERGENCY MEDICINE

## 2020-08-02 PROCEDURE — 96375 TX/PRO/DX INJ NEW DRUG ADDON: CPT

## 2020-08-02 PROCEDURE — 87040 BLOOD CULTURE FOR BACTERIA: CPT | Mod: 59

## 2020-08-02 PROCEDURE — 63600175 PHARM REV CODE 636 W HCPCS: Performed by: STUDENT IN AN ORGANIZED HEALTH CARE EDUCATION/TRAINING PROGRAM

## 2020-08-02 PROCEDURE — 99285 EMERGENCY DEPT VISIT HI MDM: CPT | Mod: ,,, | Performed by: EMERGENCY MEDICINE

## 2020-08-02 PROCEDURE — 87040 BLOOD CULTURE FOR BACTERIA: CPT

## 2020-08-02 PROCEDURE — 85025 COMPLETE CBC W/AUTO DIFF WBC: CPT

## 2020-08-02 PROCEDURE — 83605 ASSAY OF LACTIC ACID: CPT

## 2020-08-02 RX ORDER — METOPROLOL SUCCINATE 50 MG/1
50 TABLET, EXTENDED RELEASE ORAL 2 TIMES DAILY
Status: DISCONTINUED | OUTPATIENT
Start: 2020-08-02 | End: 2020-08-07 | Stop reason: HOSPADM

## 2020-08-02 RX ORDER — MORPHINE SULFATE 4 MG/ML
4 INJECTION, SOLUTION INTRAMUSCULAR; INTRAVENOUS
Status: COMPLETED | OUTPATIENT
Start: 2020-08-02 | End: 2020-08-02

## 2020-08-02 RX ORDER — ONDANSETRON 2 MG/ML
4 INJECTION INTRAMUSCULAR; INTRAVENOUS
Status: COMPLETED | OUTPATIENT
Start: 2020-08-02 | End: 2020-08-02

## 2020-08-02 RX ORDER — SODIUM CHLORIDE 0.9 % (FLUSH) 0.9 %
10 SYRINGE (ML) INJECTION
Status: DISCONTINUED | OUTPATIENT
Start: 2020-08-02 | End: 2020-08-07 | Stop reason: HOSPADM

## 2020-08-02 RX ORDER — OXYCODONE HYDROCHLORIDE 5 MG/1
5 TABLET ORAL EVERY 6 HOURS PRN
Status: DISCONTINUED | OUTPATIENT
Start: 2020-08-02 | End: 2020-08-06

## 2020-08-02 RX ORDER — ACETAMINOPHEN 325 MG/1
650 TABLET ORAL EVERY 6 HOURS
Status: DISCONTINUED | OUTPATIENT
Start: 2020-08-02 | End: 2020-08-07 | Stop reason: HOSPADM

## 2020-08-02 RX ORDER — SODIUM CHLORIDE 9 MG/ML
INJECTION, SOLUTION INTRAVENOUS CONTINUOUS
Status: DISCONTINUED | OUTPATIENT
Start: 2020-08-02 | End: 2020-08-04

## 2020-08-02 RX ORDER — LISINOPRIL 20 MG/1
40 TABLET ORAL DAILY
Status: DISCONTINUED | OUTPATIENT
Start: 2020-08-03 | End: 2020-08-07 | Stop reason: HOSPADM

## 2020-08-02 RX ORDER — DULOXETIN HYDROCHLORIDE 60 MG/1
120 CAPSULE, DELAYED RELEASE ORAL DAILY
Status: DISCONTINUED | OUTPATIENT
Start: 2020-08-03 | End: 2020-08-07 | Stop reason: HOSPADM

## 2020-08-02 RX ORDER — HEPARIN SODIUM 5000 [USP'U]/ML
7500 INJECTION, SOLUTION INTRAVENOUS; SUBCUTANEOUS EVERY 8 HOURS
Status: DISCONTINUED | OUTPATIENT
Start: 2020-08-02 | End: 2020-08-04

## 2020-08-02 RX ORDER — HYDROCHLOROTHIAZIDE 25 MG/1
50 TABLET ORAL DAILY
Status: DISCONTINUED | OUTPATIENT
Start: 2020-08-03 | End: 2020-08-07 | Stop reason: HOSPADM

## 2020-08-02 RX ORDER — MORPHINE SULFATE 2 MG/ML
4 INJECTION, SOLUTION INTRAMUSCULAR; INTRAVENOUS EVERY 4 HOURS PRN
Status: DISCONTINUED | OUTPATIENT
Start: 2020-08-02 | End: 2020-08-04

## 2020-08-02 RX ADMIN — MORPHINE SULFATE 4 MG: 2 INJECTION, SOLUTION INTRAMUSCULAR; INTRAVENOUS at 06:08

## 2020-08-02 RX ADMIN — MORPHINE SULFATE 4 MG: 4 INJECTION INTRAVENOUS at 03:08

## 2020-08-02 RX ADMIN — HEPARIN SODIUM 7500 UNITS: 5000 INJECTION INTRAVENOUS; SUBCUTANEOUS at 11:08

## 2020-08-02 RX ADMIN — ACETAMINOPHEN 650 MG: 325 TABLET ORAL at 06:08

## 2020-08-02 RX ADMIN — METOPROLOL SUCCINATE 50 MG: 50 TABLET, EXTENDED RELEASE ORAL at 11:08

## 2020-08-02 RX ADMIN — ACETAMINOPHEN 650 MG: 325 TABLET ORAL at 11:08

## 2020-08-02 RX ADMIN — ONDANSETRON 4 MG: 2 INJECTION INTRAMUSCULAR; INTRAVENOUS at 03:08

## 2020-08-02 RX ADMIN — MORPHINE SULFATE 4 MG: 2 INJECTION, SOLUTION INTRAMUSCULAR; INTRAVENOUS at 11:08

## 2020-08-02 RX ADMIN — SODIUM CHLORIDE: 0.9 INJECTION, SOLUTION INTRAVENOUS at 06:08

## 2020-08-02 NOTE — ED PROVIDER NOTES
Encounter Date: 8/2/2020  Pt seen by physician at 3:29 PM     History     Chief Complaint   Patient presents with    Post-op Problem     had an ablasion done on 7/25, had femoral artery cut and needed repair, patient has 7in incision and states it has been draining, this morning she noticed the entire incison is now busted open about 3in wide, drain still in place. patient states the wound has been having an odor the last few days, denies fever      54 y/o F with co-morbidiites of HTN, atrial fibrillation, GERD, depression presents with complaint of increasing pain to Lt groin wound as well as increased drainage. Pt has recent PCI/ablation with access Lt groin complicated by femoral artery artery laceration, requiring repair. Pt discharged on 7/24 with edith and wound vac. Pt seen by vasc surg on 7/28 and vac removed. Pt states since then the wound has had a malodorous drainage and she has had increasing pain at  Wound site. No fevers. Drain is still draining approx 200cc per day.        Review of patient's allergies indicates:  No Known Allergies  Past Medical History:   Diagnosis Date    Atrial fibrillation     cardioversion    Avascular necrosis     L hand    Depression     GERD (gastroesophageal reflux disease)     Hx of psychiatric care     Hypertension     Psychiatric problem      Past Surgical History:   Procedure Laterality Date    ABLATION OF ARRHYTHMOGENIC FOCUS FOR ATRIAL FIBRILLATION N/A 7/20/2020    Procedure: Ablation atrial fibrillation;  Surgeon: Gabriel Hawley MD;  Location: [facility] EP LAB;  Service: Cardiology;  Laterality: N/A;  afib, PVI, RFA, REENA, SHADY, anes, MB, 3 Prep    CARPAL TUNNEL RELEASE Right 6/10/2020    Procedure: RELEASE, CARPAL TUNNEL - RIGHT;  Surgeon: Adelaida Hall MD;  Location: [facility] OR;  Service: Orthopedics;  Laterality: Right;  GENERAL AND REGIONAL    EXPLORATION OF FEMORAL ARTERY Left 7/21/2020    Procedure: EXPLORATION, ARTERY, FEMORAL;  Surgeon: SEMAJ HERNANDEZ  Willi BURTON MD;  [facility];  Service: Peripheral Vascular;  Laterality: Left;  1. Urgent Direct repair, L SFA branch laceration    FOOT SURGERY      TREATMENT OF CARDIAC ARRHYTHMIA N/A 2020    Procedure: CARDIOVERSION;  Surgeon: Gabriel Hawley MD;  Location: [facility] EP LAB;  Service: Cardiology;  Laterality: N/A;  af, demi, dccv, anes, mb, 345     Family History   Problem Relation Age of Onset    Cataracts Mother     Hypertension Mother     Thyroid disease Mother     Diabetes Father     Hypertension Father     Hypertension Sister     Hypertension Brother     Amblyopia Neg Hx     Blindness Neg Hx     Cancer Neg Hx     Glaucoma Neg Hx     Macular degeneration Neg Hx     Retinal detachment Neg Hx     Strabismus Neg Hx     Stroke Neg Hx      Social History     Tobacco Use    Smoking status: Former Smoker     Types: Cigarettes     Quit date: 2019     Years since quittin.0    Smokeless tobacco: Never Used   Substance Use Topics    Alcohol use: No    Drug use: No     Review of Systems   Constitutional: Negative for fatigue and fever.   HENT: Negative for sore throat.    Respiratory: Negative for chest tightness and shortness of breath.    Cardiovascular: Positive for leg swelling. Negative for chest pain.   Gastrointestinal: Negative for abdominal distention, abdominal pain, nausea and vomiting.   Genitourinary: Negative for dysuria.   Musculoskeletal: Negative for back pain.   Skin: Positive for wound.   Neurological: Negative for dizziness, light-headedness and headaches.       Physical Exam     Initial Vitals [20 1443]   BP Pulse Resp Temp SpO2   126/69 92 16 99.2 °F (37.3 °C) 98 %      MAP       --         Physical Exam    Nursing note and vitals reviewed.  Constitutional: She appears well-developed and well-nourished. No distress.   HENT:   Head: Normocephalic and atraumatic.   Eyes: No scleral icterus.   Neck: Neck supple. No JVD present.   Cardiovascular:  Normal rate.   Pulmonary/Chest: No respiratory distress.   Abdominal:   Soft obese NT/ND   Musculoskeletal: Edema present.   Neurological: She is alert and oriented to person, place, and time.   Skin: Skin is warm and dry.   Rt groin drain with minimal serosanguinous drainage, wound with stitches with some dehiscence, minimal erythema with exudate at skin edges                   ED Course   Procedures  Labs Reviewed   CBC W/ AUTO DIFFERENTIAL - Abnormal; Notable for the following components:       Result Value    RBC 3.62 (*)     Hemoglobin 9.8 (*)     Hematocrit 32.5 (*)     Mean Corpuscular Hemoglobin Conc 30.2 (*)     RDW 15.4 (*)     Immature Granulocytes 0.6 (*)     Immature Grans (Abs) 0.05 (*)     Gran% 74.1 (*)     Lymph% 13.7 (*)     All other components within normal limits   COMPREHENSIVE METABOLIC PANEL - Abnormal; Notable for the following components:    Glucose 119 (*)     Albumin 3.4 (*)     All other components within normal limits   CULTURE, BLOOD   CULTURE, BLOOD   URINALYSIS, REFLEX TO URINE CULTURE    Narrative:     Specimen Source->Urine   LACTIC ACID, PLASMA   SARS-COV-2 RNA AMPLIFICATION, QUAL    Narrative:     STAT. Procedure in AM.   PROTIME-INR     EKG Readings: (Independently Interpreted)   Sinus rhythm, rate 75, no acute ischemic changes       Imaging Results    None          Medical Decision Making:   History:   Old Medical Records: I decided to obtain old medical records.  Initial Assessment:   56 y/o F with infected wound and partial dehiscence  Routine blood work, blood cultures sent  Vascular surgery consulted.  Anticipate admission  Independently Interpreted Test(s):   I have ordered and independently interpreted EKG Reading(s) - see prior notes  Clinical Tests:   Lab Tests: Ordered and Reviewed  Medical Tests: Ordered and Reviewed  ED Management:  5:55 PM  Unremarkable blood work. Pt seen by vasc surgery. Admit to vasc surg.  Other:   I have discussed this case with another health  care provider.       <> Summary of the Discussion: Vascular surgery        Clinical Impression:       ICD-10-CM ICD-9-CM   1. Wound dehiscence  T81.30XA 998.30   2. Atrial fibrillation  I48.91 427.31   3. Hematoma of groin, subsequent encounter  S30.1XXD V58.89     922.2         Disposition:   Disposition: Admitted  Condition: Stable     ED Disposition Condition    Admit                           Angela Das MD  08/03/20 0108

## 2020-08-02 NOTE — ED TRIAGE NOTES
Patient comes into ER with complaints of post op complications. Patient states that 7/20 she had an ablation that turned into an emergency femoral laceration repair. Patient has been having a home health nurse come out and look at her wound. Patient states she look at her wound today and it looks like it has opened up.

## 2020-08-02 NOTE — H&P
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   She had undergone a recent ablation for atrial fibrillation.    This high risk left femoral incision was closed primarily with 2 large drains  And a Preventa VAC placed.  One drain was removed prior to her discharge postoperative day 3.    On Tuesday 7/28/20 she returned to the office:  She has been having adequate pain control the tramadol.   She reports that the existing drain has put out over 300-500 cc per day over the weekend, Down to 100-200 cc per day which appears mostly serous.    Today, 8/2/20, she presents to the ER for evaluation for wound dehiscence. She reports higher drainage from the groin over the past several days. She was seen by her PCP 7/31/20, who felt the wound looked OK, but started her on Keflex as a precaution. Over the weekend, the wound began to drain more, and partially dehisced. She presented today as she became increasingly concerned.    Past Medical History:   Diagnosis Date    Atrial fibrillation     cardioversion    Avascular necrosis     L hand    Depression     GERD (gastroesophageal reflux disease)     Hx of psychiatric care     Hypertension     Psychiatric problem        Past Surgical History:   Procedure Laterality Date    ABLATION OF ARRHYTHMOGENIC FOCUS FOR ATRIAL FIBRILLATION N/A 7/20/2020    Procedure: Ablation atrial fibrillation;  Surgeon: Gabriel Hawley MD;  Location: [facility] EP LAB;  Service: Cardiology;  Laterality: N/A;  afib, PVI, RFA, REENA, SHADY, anes, MB, 3 Prep    CARPAL TUNNEL RELEASE Right 6/10/2020    Procedure: RELEASE, CARPAL TUNNEL - RIGHT;  Surgeon: Adelaida Hall MD;  Location: [facility] OR;  Service: Orthopedics;  Laterality: Right;  GENERAL AND REGIONAL    EXPLORATION OF FEMORAL ARTERY Left 7/21/2020    Procedure: EXPLORATION, ARTERY,  FEMORAL;  Surgeon: SEMAJ Márquez III, MD;  Location: [facility] OR [address] [place] [address];  Service: Peripheral Vascular;  Laterality: Left;  1. Urgent Direct repair, L SFA branch laceration    FOOT SURGERY      TREATMENT OF CARDIAC ARRHYTHMIA N/A 1/29/2020    Procedure: CARDIOVERSION;  Surgeon: Gabriel Hawley MD;  Location: [facility] EP LAB;  Service: Cardiology;  Laterality: N/A;  af, demi, dccv, anes, mb, 345         Current Facility-Administered Medications:     0.9%  NaCl infusion, , Intravenous, Continuous, Uriel Conner MD    acetaminophen tablet 650 mg, 650 mg, Oral, Q6H, Uriel Conner MD    [START ON 8/3/2020] DULoxetine DR capsule 120 mg, 120 mg, Oral, Daily, Uriel Conner MD    heparin (porcine) injection 7,500 Units, 7,500 Units, Subcutaneous, Q8H, Uriel Conner MD    [START ON 8/3/2020] hydroCHLOROthiazide tablet 50 mg, 50 mg, Oral, Daily, Uriel Conner MD    [START ON 8/3/2020] lisinopriL tablet 40 mg, 40 mg, Oral, Daily, Uriel Conner MD    metoprolol succinate (TOPROL-XL) 24 hr tablet 50 mg, 50 mg, Oral, BID, Uriel Conner MD    morphine injection 4 mg, 4 mg, Intravenous, Q4H PRN, Uriel Conner MD    oxyCODONE immediate release tablet 5 mg, 5 mg, Oral, Q6H PRN, Uriel Conner MD    sodium chloride 0.9% flush 10 mL, 10 mL, Intravenous, PRN, Uriel Conner MD    Current Outpatient Medications:     apixaban (ELIQUIS) 5 mg Tab, Take 1 tablet (5 mg total) by mouth 2 (two) times daily., Disp: 60 tablet, Rfl: 6    b complex vitamins capsule, Take 1 capsule by mouth once daily., Disp: , Rfl:     cephALEXin (KEFLEX) 500 MG capsule, Take 1 capsule (500 mg total) by mouth 4 (four) times daily. for 7 days, Disp: 28 capsule, Rfl: 0    DULoxetine (CYMBALTA) 60 MG capsule, Take 2 capsules (120 mg total) by mouth once daily., Disp: 180 capsule, Rfl: 3    furosemide (LASIX) 20 MG tablet, Take 1 tablet (20 mg total) by mouth daily as needed (fluid)., Disp: 30 tablet, Rfl: 6     gluc-[facility]-reymundo-bor 750 mg-644 mg- 30 mg-1 mg Tab, Take 1 tablet by mouth once daily. , Disp: , Rfl:     hydroCHLOROthiazide (HYDRODIURIL) 50 MG tablet, Please hold (do not take) until seen by Primary Care Physician, Disp: 90 tablet, Rfl: 5    HYDROcodone-acetaminophen (NORCO) 5-325 mg per tablet, Take 1 tablet by mouth every 6 (six) hours as needed for Pain., Disp: 20 tablet, Rfl: 0    krill/om-3/dha/epa/phospho/ast (MEGARED OMEGA-3 KRILL OIL ORAL), , Disp: , Rfl:     lisinopriL (PRINIVIL,ZESTRIL) 40 MG tablet, Please hold (do not take) until seen by Primary Care Physician, Disp: 90 tablet, Rfl: 5    MELATONIN ORAL, Take 1-2 tablets by mouth nightly as needed (Sleep)., Disp: , Rfl:     metoprolol succinate (TOPROL-XL) 50 MG 24 hr tablet, Take 1 tablet (50 mg total) by mouth 2 (two) times daily., Disp: 90 tablet, Rfl: 6    multivitamin (THERAGRAN) per tablet, Take 1 tablet by mouth once daily., Disp: , Rfl:     omeprazole (PRILOSEC) 20 MG capsule, TAKE 1 CAPSULE BY MOUTH ONCE DAILY, Disp: 90 capsule, Rfl: 3    ondansetron (ZOFRAN) 4 MG tablet, Take 1 tablet (4 mg total) by mouth every 8 (eight) hours as needed for Nausea., Disp: 30 tablet, Rfl: 0    traMADoL (ULTRAM) 50 mg tablet, Take 1 tablet (50 mg total) by mouth every 8 (eight) hours as needed for Pain., Disp: 15 tablet, Rfl: 0    traZODone (DESYREL) 100 MG tablet, Take 1 tablet (100 mg total) by mouth nightly as needed for Insomnia., Disp: 90 tablet, Rfl: 3    Facility-Administered Medications Ordered in Other Encounters:     0.9%  NaCl infusion, , Intravenous, Continuous, Derrick Dior MD, Stopped at 07/20/20 4751    Review of patient's allergies indicates:  No Known Allergies    Family History   Problem Relation Age of Onset    Cataracts Mother     Hypertension Mother     Thyroid disease Mother     Diabetes Father     Hypertension Father     Hypertension Sister     Hypertension Brother     Amblyopia Neg Hx      "Blindness Neg Hx     Cancer Neg Hx     Glaucoma Neg Hx     Macular degeneration Neg Hx     Retinal detachment Neg Hx     Strabismus Neg Hx     Stroke Neg Hx        Social History     Tobacco Use    Smoking status: Former Smoker     Types: Cigarettes     Quit date: 2019     Years since quittin.0    Smokeless tobacco: Never Used   Substance Use Topics    Alcohol use: No    Drug use: No       REVIEW OF SYSTEMS:  General: No chills, fever, malaise, changes in weight  HEENT: No visual changes, difficulty hearing  Cardiovascular: No chest pain, palpitations, claudication  Pulmonary: No dyspnea, cough, wheezing  Gastrointestinal: No nausea, vomiting, diarrhea, constipation  Genitourinary: No dysuria, low urine output, hematuria  Endocrine: No polydipsia, polyphagia  Hematologic: No fatigue with exertion, pica, pallor  Musculoskeletal: No extremity or joint pain, no back pain, no difficulty with ambulation  Neurologic: no seizures, no headaches, no weakness  Psychiatric: no mood disturbance  3    PHYSICAL EXAM:   BP (!) 119/56   Pulse 76   Temp 99.2 °F (37.3 °C) (Oral)   Resp 10   Ht 5' 6" (1.676 m)   Wt (!) 138.3 kg (304 lb 14.3 oz)   LMP 2015   SpO2 96%   Breastfeeding No   BMI 49.21 kg/m²   Constitutional:  Alert,   Well-appearing  In no distress.   Neurological: Normal speech  no focal findings  CN II - XII grossly intact.    Psychiatric: Mood and affect appropriate and symmetric.   HEENT: Normocephalic / atraumatic  PERRLA  Midline trachea  No scars across the neck   Cardiac: Regular rate and rhythm.   Pulmonary: Normal pulmonary effort.   Abdomen: Soft, not distended. Morbid obesity     Skin: Warm and well perfused.    Vascular:  The peak well perfused   Extremities/  Musculoskeletal: No edema.   2+ DP pulses bilaterally           IMAGING:  None    IMPRESSION:  status post urgent left SFA branch laceration repair evacuation huge hematoma 20.     She did well initially with " excellent wound healing, but has - unfortunately and predictably - developed secondary infection and dehiscence.    PLAN:   Admit to [facility].  OK for diet. NPO at midnight.  Check COVID.  OR tomorrow for incision, drainage and washout of left groin wound  Likely VAC closure.  May need plastics evaluation if femoral vessels exposed.  Continue wound care until OR. TID and PRN dressing changes.     Discussed with Dr. Willi Conner MD PGY-7  Vascular and Endovascular Surgery Fellow  08/02/2020

## 2020-08-03 ENCOUNTER — ANESTHESIA (OUTPATIENT)
Dept: SURGERY | Facility: HOSPITAL | Age: 55
DRG: 902 | End: 2020-08-03
Payer: COMMERCIAL

## 2020-08-03 LAB
ABO + RH BLD: NORMAL
ALBUMIN SERPL BCP-MCNC: 3.1 G/DL (ref 3.5–5.2)
ALP SERPL-CCNC: 81 U/L (ref 55–135)
ALT SERPL W/O P-5'-P-CCNC: 17 U/L (ref 10–44)
ANION GAP SERPL CALC-SCNC: 8 MMOL/L (ref 8–16)
AST SERPL-CCNC: 23 U/L (ref 10–40)
BASOPHILS # BLD AUTO: 0.06 K/UL (ref 0–0.2)
BASOPHILS NFR BLD: 0.7 % (ref 0–1.9)
BILIRUB SERPL-MCNC: 0.5 MG/DL (ref 0.1–1)
BLD GP AB SCN CELLS X3 SERPL QL: NORMAL
BUN SERPL-MCNC: 14 MG/DL (ref 6–20)
CALCIUM SERPL-MCNC: 8.8 MG/DL (ref 8.7–10.5)
CHLORIDE SERPL-SCNC: 99 MMOL/L (ref 95–110)
CO2 SERPL-SCNC: 27 MMOL/L (ref 23–29)
CREAT SERPL-MCNC: 1 MG/DL (ref 0.5–1.4)
DIFFERENTIAL METHOD: ABNORMAL
EOSINOPHIL # BLD AUTO: 0.3 K/UL (ref 0–0.5)
EOSINOPHIL NFR BLD: 3.8 % (ref 0–8)
ERYTHROCYTE [DISTWIDTH] IN BLOOD BY AUTOMATED COUNT: 15.6 % (ref 11.5–14.5)
EST. GFR  (AFRICAN AMERICAN): >60 ML/MIN/1.73 M^2
EST. GFR  (NON AFRICAN AMERICAN): >60 ML/MIN/1.73 M^2
GLUCOSE SERPL-MCNC: 96 MG/DL (ref 70–110)
GRAM STN SPEC: NORMAL
GRAM STN SPEC: NORMAL
HCT VFR BLD AUTO: 30 % (ref 37–48.5)
HGB BLD-MCNC: 8.9 G/DL (ref 12–16)
IMM GRANULOCYTES # BLD AUTO: 0.05 K/UL (ref 0–0.04)
IMM GRANULOCYTES NFR BLD AUTO: 0.6 % (ref 0–0.5)
LYMPHOCYTES # BLD AUTO: 1.5 K/UL (ref 1–4.8)
LYMPHOCYTES NFR BLD: 16.7 % (ref 18–48)
MCH RBC QN AUTO: 27.3 PG (ref 27–31)
MCHC RBC AUTO-ENTMCNC: 29.7 G/DL (ref 32–36)
MCV RBC AUTO: 92 FL (ref 82–98)
MONOCYTES # BLD AUTO: 1.1 K/UL (ref 0.3–1)
MONOCYTES NFR BLD: 12 % (ref 4–15)
NEUTROPHILS # BLD AUTO: 5.8 K/UL (ref 1.8–7.7)
NEUTROPHILS NFR BLD: 66.2 % (ref 38–73)
NRBC BLD-RTO: 0 /100 WBC
PLATELET # BLD AUTO: 309 K/UL (ref 150–350)
PMV BLD AUTO: 10.9 FL (ref 9.2–12.9)
POTASSIUM SERPL-SCNC: 3.9 MMOL/L (ref 3.5–5.1)
PROT SERPL-MCNC: 6.9 G/DL (ref 6–8.4)
RBC # BLD AUTO: 3.26 M/UL (ref 4–5.4)
SODIUM SERPL-SCNC: 134 MMOL/L (ref 136–145)
WBC # BLD AUTO: 8.76 K/UL (ref 3.9–12.7)

## 2020-08-03 PROCEDURE — 86850 RBC ANTIBODY SCREEN: CPT

## 2020-08-03 PROCEDURE — 71000033 HC RECOVERY, INTIAL HOUR: Performed by: SURGERY

## 2020-08-03 PROCEDURE — 87075 CULTR BACTERIA EXCEPT BLOOD: CPT

## 2020-08-03 PROCEDURE — 87102 FUNGUS ISOLATION CULTURE: CPT

## 2020-08-03 PROCEDURE — D9220A PRA ANESTHESIA: ICD-10-PCS | Mod: CRNA,,, | Performed by: NURSE ANESTHETIST, CERTIFIED REGISTERED

## 2020-08-03 PROCEDURE — D9220A PRA ANESTHESIA: ICD-10-PCS | Mod: ANES,,, | Performed by: ANESTHESIOLOGY

## 2020-08-03 PROCEDURE — 11042 PR DEBRIDEMENT, SKIN, SUB-Q TISSUE,=<20 SQ CM: ICD-10-PCS | Mod: 78,,, | Performed by: SURGERY

## 2020-08-03 PROCEDURE — 11042 DBRDMT SUBQ TIS 1ST 20SQCM/<: CPT | Mod: 78,,, | Performed by: SURGERY

## 2020-08-03 PROCEDURE — 80053 COMPREHEN METABOLIC PANEL: CPT

## 2020-08-03 PROCEDURE — 20600001 HC STEP DOWN PRIVATE ROOM

## 2020-08-03 PROCEDURE — 36000704 HC OR TIME LEV I 1ST 15 MIN: Performed by: SURGERY

## 2020-08-03 PROCEDURE — 85025 COMPLETE CBC W/AUTO DIFF WBC: CPT

## 2020-08-03 PROCEDURE — 25000003 PHARM REV CODE 250: Performed by: NURSE ANESTHETIST, CERTIFIED REGISTERED

## 2020-08-03 PROCEDURE — 27000221 HC OXYGEN, UP TO 24 HOURS

## 2020-08-03 PROCEDURE — D9220A PRA ANESTHESIA: Mod: CRNA,,, | Performed by: NURSE ANESTHETIST, CERTIFIED REGISTERED

## 2020-08-03 PROCEDURE — 94761 N-INVAS EAR/PLS OXIMETRY MLT: CPT

## 2020-08-03 PROCEDURE — 37000008 HC ANESTHESIA 1ST 15 MINUTES: Performed by: SURGERY

## 2020-08-03 PROCEDURE — 87077 CULTURE AEROBIC IDENTIFY: CPT

## 2020-08-03 PROCEDURE — 36000705 HC OR TIME LEV I EA ADD 15 MIN: Performed by: SURGERY

## 2020-08-03 PROCEDURE — 71000016 HC POSTOP RECOV ADDL HR: Performed by: SURGERY

## 2020-08-03 PROCEDURE — 87205 SMEAR GRAM STAIN: CPT

## 2020-08-03 PROCEDURE — 25000003 PHARM REV CODE 250: Performed by: STUDENT IN AN ORGANIZED HEALTH CARE EDUCATION/TRAINING PROGRAM

## 2020-08-03 PROCEDURE — 63600175 PHARM REV CODE 636 W HCPCS: Performed by: STUDENT IN AN ORGANIZED HEALTH CARE EDUCATION/TRAINING PROGRAM

## 2020-08-03 PROCEDURE — 87070 CULTURE OTHR SPECIMN AEROBIC: CPT

## 2020-08-03 PROCEDURE — D9220A PRA ANESTHESIA: Mod: ANES,,, | Performed by: ANESTHESIOLOGY

## 2020-08-03 PROCEDURE — 36415 COLL VENOUS BLD VENIPUNCTURE: CPT

## 2020-08-03 PROCEDURE — 87076 CULTURE ANAEROBE IDENT EACH: CPT

## 2020-08-03 PROCEDURE — 99900035 HC TECH TIME PER 15 MIN (STAT)

## 2020-08-03 PROCEDURE — 37000009 HC ANESTHESIA EA ADD 15 MINS: Performed by: SURGERY

## 2020-08-03 PROCEDURE — 87186 SC STD MICRODIL/AGAR DIL: CPT | Mod: 59

## 2020-08-03 PROCEDURE — 63600175 PHARM REV CODE 636 W HCPCS: Performed by: NURSE ANESTHETIST, CERTIFIED REGISTERED

## 2020-08-03 PROCEDURE — 71000015 HC POSTOP RECOV 1ST HR: Performed by: SURGERY

## 2020-08-03 PROCEDURE — 63600175 PHARM REV CODE 636 W HCPCS: Performed by: ANESTHESIOLOGY

## 2020-08-03 RX ORDER — DEXAMETHASONE SODIUM PHOSPHATE 4 MG/ML
INJECTION, SOLUTION INTRA-ARTICULAR; INTRALESIONAL; INTRAMUSCULAR; INTRAVENOUS; SOFT TISSUE
Status: DISCONTINUED | OUTPATIENT
Start: 2020-08-03 | End: 2020-08-03

## 2020-08-03 RX ORDER — PROPOFOL 10 MG/ML
VIAL (ML) INTRAVENOUS
Status: DISCONTINUED | OUTPATIENT
Start: 2020-08-03 | End: 2020-08-03

## 2020-08-03 RX ORDER — FENTANYL CITRATE 50 UG/ML
INJECTION, SOLUTION INTRAMUSCULAR; INTRAVENOUS
Status: DISCONTINUED | OUTPATIENT
Start: 2020-08-03 | End: 2020-08-03

## 2020-08-03 RX ORDER — ONDANSETRON 2 MG/ML
INJECTION INTRAMUSCULAR; INTRAVENOUS
Status: DISCONTINUED | OUTPATIENT
Start: 2020-08-03 | End: 2020-08-03

## 2020-08-03 RX ORDER — GLYCOPYRROLATE 0.2 MG/ML
INJECTION INTRAMUSCULAR; INTRAVENOUS
Status: DISCONTINUED | OUTPATIENT
Start: 2020-08-03 | End: 2020-08-03

## 2020-08-03 RX ORDER — ONDANSETRON HYDROCHLORIDE 4 MG/5ML
4 SOLUTION ORAL ONCE
Status: COMPLETED | OUTPATIENT
Start: 2020-08-03 | End: 2020-08-03

## 2020-08-03 RX ORDER — SODIUM CHLORIDE 0.9 % (FLUSH) 0.9 %
3 SYRINGE (ML) INJECTION
Status: DISCONTINUED | OUTPATIENT
Start: 2020-08-03 | End: 2020-08-03 | Stop reason: HOSPADM

## 2020-08-03 RX ORDER — HYDROMORPHONE HYDROCHLORIDE 1 MG/ML
0.2 INJECTION, SOLUTION INTRAMUSCULAR; INTRAVENOUS; SUBCUTANEOUS EVERY 5 MIN PRN
Status: DISCONTINUED | OUTPATIENT
Start: 2020-08-03 | End: 2020-08-03 | Stop reason: HOSPADM

## 2020-08-03 RX ORDER — MIDAZOLAM HYDROCHLORIDE 1 MG/ML
INJECTION, SOLUTION INTRAMUSCULAR; INTRAVENOUS
Status: DISCONTINUED | OUTPATIENT
Start: 2020-08-03 | End: 2020-08-03

## 2020-08-03 RX ORDER — CEFAZOLIN SODIUM 1 G/3ML
INJECTION, POWDER, FOR SOLUTION INTRAMUSCULAR; INTRAVENOUS
Status: DISCONTINUED | OUTPATIENT
Start: 2020-08-03 | End: 2020-08-03

## 2020-08-03 RX ORDER — LIDOCAINE HCL/PF 100 MG/5ML
SYRINGE (ML) INTRAVENOUS
Status: DISCONTINUED | OUTPATIENT
Start: 2020-08-03 | End: 2020-08-03

## 2020-08-03 RX ORDER — EPHEDRINE SULFATE 50 MG/ML
INJECTION, SOLUTION INTRAVENOUS
Status: DISCONTINUED | OUTPATIENT
Start: 2020-08-03 | End: 2020-08-03

## 2020-08-03 RX ORDER — VASOPRESSIN 20 [USP'U]/ML
INJECTION, SOLUTION INTRAMUSCULAR; SUBCUTANEOUS
Status: DISCONTINUED | OUTPATIENT
Start: 2020-08-03 | End: 2020-08-03

## 2020-08-03 RX ADMIN — SODIUM CHLORIDE: 0.9 INJECTION, SOLUTION INTRAVENOUS at 11:08

## 2020-08-03 RX ADMIN — HYDROMORPHONE HYDROCHLORIDE 0.2 MG: 1 INJECTION, SOLUTION INTRAMUSCULAR; INTRAVENOUS; SUBCUTANEOUS at 01:08

## 2020-08-03 RX ADMIN — ONDANSETRON HYDROCHLORIDE 4 MG: 4 SOLUTION ORAL at 09:08

## 2020-08-03 RX ADMIN — EPHEDRINE SULFATE 10 MG: 50 INJECTION INTRAVENOUS at 12:08

## 2020-08-03 RX ADMIN — ACETAMINOPHEN 650 MG: 325 TABLET ORAL at 05:08

## 2020-08-03 RX ADMIN — METOPROLOL SUCCINATE 50 MG: 50 TABLET, EXTENDED RELEASE ORAL at 08:08

## 2020-08-03 RX ADMIN — OXYCODONE HYDROCHLORIDE 5 MG: 5 TABLET ORAL at 07:08

## 2020-08-03 RX ADMIN — VASOPRESSIN 1 UNITS: 20 INJECTION INTRAVENOUS at 12:08

## 2020-08-03 RX ADMIN — MORPHINE SULFATE 4 MG: 2 INJECTION, SOLUTION INTRAMUSCULAR; INTRAVENOUS at 04:08

## 2020-08-03 RX ADMIN — VASOPRESSIN 2 UNITS: 20 INJECTION INTRAVENOUS at 12:08

## 2020-08-03 RX ADMIN — MORPHINE SULFATE 4 MG: 2 INJECTION, SOLUTION INTRAMUSCULAR; INTRAVENOUS at 05:08

## 2020-08-03 RX ADMIN — PROPOFOL 50 MG: 10 INJECTION, EMULSION INTRAVENOUS at 12:08

## 2020-08-03 RX ADMIN — LISINOPRIL 40 MG: 20 TABLET ORAL at 08:08

## 2020-08-03 RX ADMIN — FENTANYL CITRATE 50 MCG: 50 INJECTION, SOLUTION INTRAMUSCULAR; INTRAVENOUS at 11:08

## 2020-08-03 RX ADMIN — MORPHINE SULFATE 4 MG: 2 INJECTION, SOLUTION INTRAMUSCULAR; INTRAVENOUS at 10:08

## 2020-08-03 RX ADMIN — METOPROLOL SUCCINATE 50 MG: 50 TABLET, EXTENDED RELEASE ORAL at 09:08

## 2020-08-03 RX ADMIN — HEPARIN SODIUM 7500 UNITS: 5000 INJECTION INTRAVENOUS; SUBCUTANEOUS at 09:08

## 2020-08-03 RX ADMIN — DULOXETINE HYDROCHLORIDE 120 MG: 60 CAPSULE, DELAYED RELEASE ORAL at 08:08

## 2020-08-03 RX ADMIN — PROPOFOL 150 MG: 10 INJECTION, EMULSION INTRAVENOUS at 11:08

## 2020-08-03 RX ADMIN — CEFAZOLIN 3 G: 330 INJECTION, POWDER, FOR SOLUTION INTRAMUSCULAR; INTRAVENOUS at 12:08

## 2020-08-03 RX ADMIN — OXYCODONE HYDROCHLORIDE 5 MG: 5 TABLET ORAL at 01:08

## 2020-08-03 RX ADMIN — ONDANSETRON 4 MG: 2 INJECTION, SOLUTION INTRAMUSCULAR; INTRAVENOUS at 11:08

## 2020-08-03 RX ADMIN — LIDOCAINE HYDROCHLORIDE 100 MG: 20 INJECTION, SOLUTION INTRAVENOUS at 11:08

## 2020-08-03 RX ADMIN — EPHEDRINE SULFATE 15 MG: 50 INJECTION INTRAVENOUS at 12:08

## 2020-08-03 RX ADMIN — HEPARIN SODIUM 7500 UNITS: 5000 INJECTION INTRAVENOUS; SUBCUTANEOUS at 05:08

## 2020-08-03 RX ADMIN — GLYCOPYRROLATE 0.2 MG: 0.2 INJECTION, SOLUTION INTRAMUSCULAR; INTRAVENOUS at 12:08

## 2020-08-03 RX ADMIN — SODIUM CHLORIDE, SODIUM GLUCONATE, SODIUM ACETATE, POTASSIUM CHLORIDE, MAGNESIUM CHLORIDE, SODIUM PHOSPHATE, DIBASIC, AND POTASSIUM PHOSPHATE: .53; .5; .37; .037; .03; .012; .00082 INJECTION, SOLUTION INTRAVENOUS at 12:08

## 2020-08-03 RX ADMIN — ACETAMINOPHEN 650 MG: 325 TABLET ORAL at 01:08

## 2020-08-03 RX ADMIN — MIDAZOLAM HYDROCHLORIDE 2 MG: 1 INJECTION, SOLUTION INTRAMUSCULAR; INTRAVENOUS at 12:08

## 2020-08-03 RX ADMIN — DEXAMETHASONE SODIUM PHOSPHATE 4 MG: 4 INJECTION, SOLUTION INTRAMUSCULAR; INTRAVENOUS at 11:08

## 2020-08-03 RX ADMIN — MIDAZOLAM HYDROCHLORIDE 2 MG: 1 INJECTION, SOLUTION INTRAMUSCULAR; INTRAVENOUS at 11:08

## 2020-08-03 RX ADMIN — OXYCODONE HYDROCHLORIDE 5 MG: 5 TABLET ORAL at 03:08

## 2020-08-03 NOTE — NURSING TRANSFER
Nursing Transfer Note      8/3/2020     Transfer to [facility] from PACU    Transfer via stretcher    Transfer with cardiac monitoring, eye glasses, wound vac    Transported by PACU PCT    Medicines sent:     Chart send with patient:yes    Notified: sister

## 2020-08-03 NOTE — ANESTHESIA POSTPROCEDURE EVALUATION
Anesthesia Post Evaluation    Patient: Vicky Alvarado    Procedure(s) Performed: Procedure(s) (LRB):  WASHOUT (Left)  APPLICATION, WOUND VAC (Left)    Final Anesthesia Type: general    Patient location during evaluation: PACU  Patient participation: Yes- Able to Participate  Level of consciousness: responds to stimulation  Post-procedure vital signs: reviewed and stable  Pain management: adequate  Airway patency: patent    PONV status at discharge: No PONV  Anesthetic complications: no      Cardiovascular status: blood pressure returned to baseline and stable  Respiratory status: spontaneous ventilation  Hydration status: euvolemic  Follow-up not needed.          Vitals Value Taken Time   /53 08/03/20 1515   Temp 36.5 °C (97.7 °F) 08/03/20 1515   Pulse 61 08/03/20 1515   Resp 18 08/03/20 1631   SpO2 95 % 08/03/20 1515         Event Time   Out of Recovery 08/03/2020 13:30:00         Pain/Tolu Score: Pain Rating Prior to Med Admin: 3 (8/3/2020  5:15 PM)  Pain Rating Post Med Admin: 3 (8/3/2020  2:15 PM)  Tolu Score: 10 (8/3/2020  2:00 PM)

## 2020-08-03 NOTE — CONSULTS
Plastic Surgery Consultation    Date of Consultation:   2020    Reason for Consultation:  Wound closure    History of Present Illness:  Vicky Alvarado is a 55 year old female with a history of atrial fibrillation, depression, GERD, and HTN presenting for wound closure of a left inguinal wound. Two weeks ago, patient was taken to OR by cardiology for an ablation for persistent atrial fibrillation. Postoperatively, patient developed a large femoral hematoma over the site of catheterization. Patient was taken urgently to the OR by vascular surgery for evacuation of the hematoma and repair of a left SFA laceration.    A wound vac was placed after the initial closure, and patient did well in the perioperative period with minimal serosanguineous output. Patient reports that over the weekend she developed increased drainage from the site. Patient returned to the ED on  with a dehisced incision and was taken to the OR on 8/3 for washout and wound vac placement. During washout, the vessels were not found to be visible, and there was no bella purulence.     Past Medical History:    has a past medical history of Atrial fibrillation, Avascular necrosis, Depression, GERD (gastroesophageal reflux disease), psychiatric care, Hypertension, and Psychiatric problem.    Past Surgical History:    has a past surgical history that includes Treatment of cardiac arrhythmia (N/A, 2020); Foot surgery; Carpal tunnel release (Right, 6/10/2020); Exploration of femoral artery (Left, 2020); and Ablation of arrhythmogenic focus for atrial fibrillation (N/A, 2020).    Social History:  Social History     Tobacco Use    Smoking status: Former Smoker     Types: Cigarettes     Quit date: 2019     Years since quittin.0    Smokeless tobacco: Never Used   Substance Use Topics    Alcohol use: No     Social History     Substance and Sexual Activity   Drug Use No       Family History:  Family History   Problem Relation  Age of Onset    Cataracts Mother     Hypertension Mother     Thyroid disease Mother     Diabetes Father     Hypertension Father     Hypertension Sister     Hypertension Brother     Amblyopia Neg Hx     Blindness Neg Hx     Cancer Neg Hx     Glaucoma Neg Hx     Macular degeneration Neg Hx     Retinal detachment Neg Hx     Strabismus Neg Hx     Stroke Neg Hx        Allergies:  Review of patient's allergies indicates:  No Known Allergies    Home Medications:  Prior to Admission medications    Medication Sig Start Date End Date Taking? Authorizing Provider   apixaban (ELIQUIS) 5 mg Tab Take 1 tablet (5 mg total) by mouth 2 (two) times daily. 6/20/20   Gordy Cordero MD   b complex vitamins capsule Take 1 capsule by mouth once daily.    Historical Provider, MD   cephALEXin (KEFLEX) 500 MG capsule Take 1 capsule (500 mg total) by mouth 4 (four) times daily. for 7 days 7/31/20 8/7/20  Gordy Cordero MD   DULoxetine (CYMBALTA) 60 MG capsule Take 2 capsules (120 mg total) by mouth once daily. 7/13/20   Ricardo Ibrahim MD   furosemide (LASIX) 20 MG tablet Take 1 tablet (20 mg total) by mouth daily as needed (fluid). 7/31/20   Gordy Cordero MD   gluc-shikha-[facility]-V-khsv-reymundo-bor 750 mg-644 mg- 30 mg-1 mg Tab Take 1 tablet by mouth once daily.  1/1/15   Historical Provider, MD   hydroCHLOROthiazide (HYDRODIURIL) 50 MG tablet Please hold (do not take) until seen by Primary Care Physician 7/24/20   Pedro Luis Coyle MD   HYDROcodone-acetaminophen (NORCO) 5-325 mg per tablet Take 1 tablet by mouth every 6 (six) hours as needed for Pain. 7/28/20   Eladia Curiel MD   krbrandie/om-3/dha/epa/phospho/ast (MEGARED OMEGA-3 KRILL OIL ORAL)     Historical Provider, MD   lisinopriL (PRINIVIL,ZESTRIL) 40 MG tablet Please hold (do not take) until seen by Primary Care Physician 7/24/20   Pedro Luis Cyole MD   MELATONIN ORAL Take 1-2 tablets by mouth nightly as needed (Sleep).    Historical Provider, MD   metoprolol succinate  (TOPROL-XL) 50 MG 24 hr tablet Take 1 tablet (50 mg total) by mouth 2 (two) times daily. 7/13/20 10/15/20  Gabriel Hawley MD   multivitamin (THERAGRAN) per tablet Take 1 tablet by mouth once daily.    Historical Provider, MD   omeprazole (PRILOSEC) 20 MG capsule TAKE 1 CAPSULE BY MOUTH ONCE DAILY 20      ondansetron (ZOFRAN) 4 MG tablet Take 1 tablet (4 mg total) by mouth every 8 (eight) hours as needed for Nausea. 20   Pedro Luis Coyle MD   traMADoL (ULTRAM) 50 mg tablet Take 1 tablet (50 mg total) by mouth every 8 (eight) hours as needed for Pain. 20   Pedro Luis Coyle MD   traZODone (DESYREL) 100 MG tablet Take 1 tablet (100 mg total) by mouth nightly as needed for Insomnia. 20   Ricardo Ibrahim MD       Review of Systems:  Negative except for what is noted in HPI    Physical Exam:  VITAL SIGNS:   Vitals:    20 1400 20 1415 20 1515 20 1631   BP: (!) 84/38 (!) 97/47 (!) 112/53    BP Location:  Right arm Left arm    Patient Position:  Lying Sitting    Pulse: 65 (!) 58 61    Resp: 20 19 18 18   Temp:  98.8 °F (37.1 °C) 97.7 °F (36.5 °C)    TempSrc:  Temporal Oral    SpO2: (!) 93% 95% 95%    Weight:       Height:         TMAX: Temp (24hrs), Av °F (36.7 °C), Min:97.4 °F (36.3 °C), Max:98.8 °F (37.1 °C)    General: Alert; No acute distress  Cardiovascular: Regular rate   Respiratory: Normal respiratory effort. Equal excursion.   Abdomen: Soft, nontender, nondistended  Extremity: Moves all extremities equally.  Neurologic: No focal deficit. Speech normal  Skin: Left groin incision with wound vac in place, good suction, no leak. Moderate amount serosanguineous output.         Diagnostic Data:  Recent Results (from the past 336 hour(s))   CBC auto differential    Collection Time: 20  4:19 AM   Result Value Ref Range    WBC 8.76 3.90 - 12.70 K/uL    Hemoglobin 8.9 (L) 12.0 - 16.0 g/dL    Hematocrit 30.0 (L) 37.0 - 48.5 %    Platelets 309 150 - 350 K/uL   CBC auto  differential    Collection Time: 08/02/20  3:17 PM   Result Value Ref Range    WBC 8.56 3.90 - 12.70 K/uL    Hemoglobin 9.8 (L) 12.0 - 16.0 g/dL    Hematocrit 32.5 (L) 37.0 - 48.5 %    Platelets 301 150 - 350 K/uL   CBC auto differential    Collection Time: 07/28/20 11:56 AM   Result Value Ref Range    WBC 10.90 3.90 - 12.70 K/uL    Hemoglobin 9.0 (L) 12.0 - 16.0 g/dL    Hematocrit 29.9 (L) 37.0 - 48.5 %    Platelets 246 150 - 350 K/uL     Recent Results (from the past 336 hour(s))   Basic metabolic panel    Collection Time: 07/24/20  3:44 AM   Result Value Ref Range    Sodium 136 136 - 145 mmol/L    Potassium 4.5 3.5 - 5.1 mmol/L    Chloride 102 95 - 110 mmol/L    CO2 24 23 - 29 mmol/L    BUN, Bld 15 6 - 20 mg/dL    Creatinine 0.8 0.5 - 1.4 mg/dL    Calcium 8.7 8.7 - 10.5 mg/dL    Anion Gap 10 8 - 16 mmol/L   Basic metabolic panel    Collection Time: 07/23/20  5:43 AM   Result Value Ref Range    Sodium 135 (L) 136 - 145 mmol/L    Potassium 4.0 3.5 - 5.1 mmol/L    Chloride 99 95 - 110 mmol/L    CO2 28 23 - 29 mmol/L    BUN, Bld 18 6 - 20 mg/dL    Creatinine 0.8 0.5 - 1.4 mg/dL    Calcium 8.5 (L) 8.7 - 10.5 mg/dL    Anion Gap 8 8 - 16 mmol/L   Basic metabolic panel    Collection Time: 07/22/20  4:09 AM   Result Value Ref Range    Sodium 135 (L) 136 - 145 mmol/L    Potassium 3.5 3.5 - 5.1 mmol/L    Chloride 98 95 - 110 mmol/L    CO2 32 (H) 23 - 29 mmol/L    BUN, Bld 19 6 - 20 mg/dL    Creatinine 0.8 0.5 - 1.4 mg/dL    Calcium 7.8 (L) 8.7 - 10.5 mg/dL    Anion Gap 5 (L) 8 - 16 mmol/L     Lab Results   Component Value Date    ALBUMIN 3.1 (L) 08/03/2020     No results found for: CRP  Lab Results   Component Value Date    INR 1.0 07/20/2020     No results found for: PTT    Microbiology Results (last 7 days)     Procedure Component Value Units Date/Time    Gram stain [[id number]] Collected: 08/03/20 1227    Order Status: Completed Specimen: Wound from Groin Updated: 08/03/20 1356     Gram Stain Result No WBC's      No  organisms seen    Fungus culture [[id number]] Collected: 08/03/20 1227    Order Status: Sent Specimen: Wound from Groin Updated: 08/03/20 1242    Aerobic culture [[id number]] Collected: 08/03/20 1227    Order Status: Sent Specimen: Wound from Groin Updated: 08/03/20 1242    Culture, Anaerobe [[id number]] Collected: 08/03/20 1227    Order Status: Sent Specimen: Wound from Groin Updated: 08/03/20 1242    Blood culture #2 [[id number]] Collected: 08/02/20 2125    Order Status: Completed Specimen: Blood from Peripheral, Forearm, Left Updated: 08/03/20 0515     Blood Culture, Routine No Growth to date    Narrative:      Blood Culture #2    Blood culture #1 [[id number]] Collected: 08/02/20 1518    Order Status: Completed Specimen: Blood from Peripheral, Antecubital, Left Updated: 08/03/20 0515     Blood Culture, Routine No Growth to date    Narrative:      Blood Culture #1          Assessment:  55 y.o.female  with a history of atrial fibrillation, depression, GERD, and HTN S/P atrial ablation c/b left SFA laceration and hematoma, S/P repair and wound vac placement c/b wound dehiscence, now presenting for definitive wound management and closure.    Plan:  - per vascular, planning to return to OR on 8/6 for wound vac exchange  - at that time, plastic surgery will be available to assess wound for possible flap closure vs continued wound vac         Jonathan Castellano MD  Plastic Surgery

## 2020-08-03 NOTE — ANESTHESIA PREPROCEDURE EVALUATION
[facility]  Anesthesia Pre-Operative Evaluation         Patient Name: Vicky Alvarado  YOB: 1965  MRN: [id number]    SUBJECTIVE:     Pre-operative evaluation for Procedure(s) (LRB):  WASHOUT (Left)     08/02/2020    iVcky Alvarado is a 55 y.o. female w/ a significant PMHx of HFpEF (EF 60%), Afib, obesity, Mild AS, recently admitted for ablation with hospital course c/b left groin hematoma requiring emergent femoral exploration. Patient presented to ED for evaluation of wound dehiscence.     Patient now presents for the above procedure(s).      LDA:        Peripheral IV - Single Lumen 08/02/20 1530 20 G Left Antecubital (Active)   Site Assessment Clean;Dry;Intact 08/02/20 1534   Line Status Blood return noted;Saline locked 08/02/20 1534   Dressing Status Clean;Dry;Intact 08/02/20 1534   Dressing Intervention First dressing 08/02/20 1534   Dressing Change Due 08/06/20 08/02/20 1534   Site Change Due 08/06/20 08/02/20 1534   Reason Not Rotated Not due 08/02/20 1534   Number of days: 0            Closed/Suction Drain 07/21/20 0423 Left;Proximal;Anterior Thigh Accordion (Active)   Dressing Type Gauze 07/24/20 0800   Dressing Status Clean;Dry;Intact 07/24/20 0800   Dressing Intervention Integrity maintained 07/24/20 0800   Drainage Serosanguineous 07/24/20 0800   Status Other (Comment) 07/24/20 0800   Output (mL) 60 mL 07/24/20 0400   Number of days: 12            Closed/Suction Drain 07/21/20 0423 Left;Ventral Thigh Accordion (Active)   Dressing Type Gauze 07/24/20 0800   Dressing Status Clean;Dry;Intact 07/24/20 0800   Dressing Intervention Integrity maintained 07/24/20 0800   Drainage Serosanguineous 07/24/20 0800   Status Other (Comment) 07/24/20 0800   Output (mL) 90 mL 07/24/20 0400   Number of days: 12       Prev airway: Placement Date: 07/21/20; Placement Time: 0337 (created via procedure documentation); Method of Intubation: Direct laryngoscopy; Mask Ventilation:  Easy; Intubated: Postinduction; Blade: Ng #2; Airway Device Size: 7.5; Placement Verified By: Capnometry; Complicating Factors: None; Intubation Findings: Bilateral breath sounds; Securment: Lips; Complications: None; Removal Date: 07/21/20;  Removal Time: 0626    Drips: None documented.   sodium chloride 0.9% 125 mL/hr at 08/02/20 1816       Patient Active Problem List   Diagnosis    Opiate dependence    Opioid dependence in remission    Hypertension    Chronic diastolic heart failure    Atrial Fibrillation (paroxysmal)    Cardiomyopathy    Avascular necrosis of lunate    Body mass index (BMI) 45.0-49.9, adult    History of opioid abuse    Severe obesity (BMI >= 40)    Pre-diabetes    Right carpal tunnel syndrome    Weakness of right hand    ERENDIRA (obstructive sleep apnea)    Atrial fibrillation    Hematoma of groin    Hematoma    Depression    Wound dehiscence       Review of patient's allergies indicates:  No Known Allergies    Current Inpatient Medications:   acetaminophen  650 mg Oral Q6H    [START ON 8/3/2020] DULoxetine  120 mg Oral Daily    heparin (porcine)  7,500 Units Subcutaneous Q8H    [START ON 8/3/2020] hydroCHLOROthiazide  50 mg Oral Daily    [START ON 8/3/2020] lisinopriL  40 mg Oral Daily    metoprolol succinate  50 mg Oral BID       Current Facility-Administered Medications on File Prior to Encounter   Medication Dose Route Frequency Provider Last Rate Last Dose    0.9%  NaCl infusion   Intravenous Continuous Derrick Bandar Dior MD   Stopped at 07/20/20 1451     Current Outpatient Medications on File Prior to Encounter   Medication Sig Dispense Refill    apixaban (ELIQUIS) 5 mg Tab Take 1 tablet (5 mg total) by mouth 2 (two) times daily. 60 tablet 6    b complex vitamins capsule Take 1 capsule by mouth once daily.      cephALEXin (KEFLEX) 500 MG capsule Take 1 capsule (500 mg total) by mouth 4 (four) times daily. for 7 days 28 capsule 0    DULoxetine  (CYMBALTA) 60 MG capsule Take 2 capsules (120 mg total) by mouth once daily. 180 capsule 3    furosemide (LASIX) 20 MG tablet Take 1 tablet (20 mg total) by mouth daily as needed (fluid). 30 tablet 6    gluc-shikha-[facility] [place]-R-muel-reymundo-bor 750 mg-644 mg- 30 mg-1 mg Tab Take 1 tablet by mouth once daily.       hydroCHLOROthiazide (HYDRODIURIL) 50 MG tablet Please hold (do not take) until seen by Primary Care Physician 90 tablet 5    HYDROcodone-acetaminophen (NORCO) 5-325 mg per tablet Take 1 tablet by mouth every 6 (six) hours as needed for Pain. 20 tablet 0    krill/om-3/dha/epa/phospho/ast (MEGARED OMEGA-3 KRILL OIL ORAL)       lisinopriL (PRINIVIL,ZESTRIL) 40 MG tablet Please hold (do not take) until seen by Primary Care Physician 90 tablet 5    MELATONIN ORAL Take 1-2 tablets by mouth nightly as needed (Sleep).      metoprolol succinate (TOPROL-XL) 50 MG 24 hr tablet Take 1 tablet (50 mg total) by mouth 2 (two) times daily. 90 tablet 6    multivitamin (THERAGRAN) per tablet Take 1 tablet by mouth once daily.      omeprazole (PRILOSEC) 20 MG capsule TAKE 1 CAPSULE BY MOUTH ONCE DAILY 90 capsule 3    ondansetron (ZOFRAN) 4 MG tablet Take 1 tablet (4 mg total) by mouth every 8 (eight) hours as needed for Nausea. 30 tablet 0    traMADoL (ULTRAM) 50 mg tablet Take 1 tablet (50 mg total) by mouth every 8 (eight) hours as needed for Pain. 15 tablet 0    traZODone (DESYREL) 100 MG tablet Take 1 tablet (100 mg total) by mouth nightly as needed for Insomnia. 90 tablet 3       Past Surgical History:   Procedure Laterality Date    ABLATION OF ARRHYTHMOGENIC FOCUS FOR ATRIAL FIBRILLATION N/A 7/20/2020    Procedure: Ablation atrial fibrillation;  Surgeon: Gabriel Hawley MD;  Location: [facility] EP LAB;  Service: Cardiology;  Laterality: N/A;  afib, PVI, RFA, REENA, SHADY, anes, MB, 3 Prep    CARPAL TUNNEL RELEASE Right 6/10/2020    Procedure: RELEASE, CARPAL TUNNEL - RIGHT;  Surgeon: Adelaida Hall MD;  Location:  [facility] [place], operated by [facility] OR;  Service: Orthopedics;  Laterality: Right;  GENERAL AND REGIONAL    EXPLORATION OF FEMORAL ARTERY Left 2020    Procedure: EXPLORATION, ARTERY, FEMORAL;  Surgeon: SEMAJ Márquez III, MD;  [address];  Service: Peripheral Vascular;  Laterality: Left;  1. Urgent Direct repair, L SFA branch laceration    FOOT SURGERY      TREATMENT OF CARDIAC ARRHYTHMIA N/A 2020    Procedure: CARDIOVERSION;  Surgeon: Gabriel Hawley MD;  Location: [facility] EP LAB;  Service: Cardiology;  Laterality: N/A;  af, demi, dccv, anes, mb, 345       Social History     Socioeconomic History    Marital status: Single     Spouse name: Not on file    Number of children: Not on file    Years of education: Not on file    Highest education level: Not on file   Occupational History    Not on file   Social Needs    Financial resource strain: Not on file    Food insecurity     Worry: Not on file     Inability: Not on file    Transportation needs     Medical: Not on file     Non-medical: Not on file   Tobacco Use    Smoking status: Former Smoker     Types: Cigarettes     Quit date: 2019     Years since quittin.0    Smokeless tobacco: Never Used   Substance and Sexual Activity    Alcohol use: No    Drug use: No    Sexual activity: Not on file   Lifestyle    Physical activity     Days per week: Not on file     Minutes per session: Not on file    Stress: Not on file   Relationships    Social connections     Talks on phone: Not on file     Gets together: Not on file     Attends Mu-ism service: Not on file     Active member of club or organization: Not on file     Attends meetings of clubs or organizations: Not on file     Relationship status: Not on file   Other Topics Concern    Patient feels they ought to cut down on drinking/drug use Not Asked    Patient annoyed by others criticizing their drinking/drug use Not Asked    Patient has felt bad or guilty about drinking/drug use Not Asked     Patient has had a drink/used drugs as an eye opener in the AM Not Asked   Social History Narrative    Not on file       OBJECTIVE:     Vital Signs Range (Last 24H):  Temp:  [37.3 °C (99.2 °F)]   Pulse:  [76-92]   Resp:  [10-20]   BP: (105-146)/(53-77)   SpO2:  [95 %-98 %]       Significant Labs:  Lab Results   Component Value Date    WBC 8.56 08/02/2020    HGB 9.8 (L) 08/02/2020    HCT 32.5 (L) 08/02/2020     08/02/2020    CHOL 209 (H) 02/21/2020    TRIG 191 (H) 02/21/2020    HDL 43 02/21/2020    ALT 16 08/02/2020    AST 26 08/02/2020     08/02/2020    K 4.1 08/02/2020     08/02/2020    CREATININE 0.9 08/02/2020    BUN 11 08/02/2020    CO2 26 08/02/2020    TSH 1.225 01/28/2020    INR 1.0 07/20/2020    HGBA1C 7.1 (H) 02/21/2020       Diagnostic Studies: No relevant studies.    EKG:   Results for orders placed or performed during the hospital encounter of 07/20/20   EKG 12-lead    Collection Time: 07/20/20  3:40 PM    Narrative    Test Reason : I48.91,    Vent. Rate : 072 BPM     Atrial Rate : 072 BPM     P-R Int : 144 ms          QRS Dur : 090 ms      QT Int : 436 ms       P-R-T Axes : 071 051 062 degrees     QTc Int : 477 ms    Normal sinus rhythm  Normal ECG  When compared with ECG of 20-JUL-2020 09:41,  No significant change was found  Confirmed by IMELDA MATA MD ([id number]) on 7/21/2020 6:47:40 AM    Referred By: MADAY RUIZ           Confirmed By:IMELDA MATA MD       2D ECHO:  TTE:  Results for orders placed or performed during the hospital encounter of 05/05/20   Echo Color Flow Doppler? Yes   Result Value Ref Range    Ascending aorta 2.98 cm    STJ 2.21 cm    AV mean gradient 12 mmHg    Ao peak carlee 2.42 m/s    Ao VTI 47.90 cm    IVRT 114.18 msec    IVS 0.79 0.6 - 1.1 cm    LA size 4.63 cm    Left Atrium Major Axis 6.41 cm    Left Atrium Minor Axis 6.40 cm    LVIDD 4.77 3.5 - 6.0 cm    LVIDS 3.22 2.1 - 4.0 cm    LVOT diameter 2.01 cm    LVOT peak VTI 27.58 cm    PW 0.79 0.6 - 1.1 cm     MV Peak A Eulalio 0.87 m/s    E wave decelartion time 327.47 msec    MV Peak E Eulalio 0.66 m/s    PV Peak D Eulalio 0.40 m/s    PV Peak S Eulalio 0.68 m/s    RA Major Axis 4.63 cm    RA Width 2.85 cm    RVDD 3.88 cm    Sinus 2.58 cm    TAPSE 2.23 cm    TR Max Eulalio 2.16 m/s    TDI LATERAL 0.09 m/s    TDI SEPTAL 0.06 m/s    LA WIDTH 4.59 cm    LV Diastolic Volume 105.82 mL    LV Systolic Volume 41.71 mL    RV S' 11.67 cm/s    LVOT peak eulalio 1.29 m/s    LV LATERAL E/E' RATIO 7.33 m/s    LV SEPTAL E/E' RATIO 11.00 m/s    FS 32 %    LA volume 115.70 cm3    LV mass 123.33 g    Left Ventricle Relative Wall Thickness 0.33 cm    AV valve area 1.83 cm2    AV Velocity Ratio 0.53     AV index (prosthetic) 0.58     E/A ratio 0.76     Mean e' 0.08 m/s    Pulm vein S/D ratio 1.70     LVOT area 3.2 cm2    LVOT stroke volume 87.47 cm3    AV peak gradient 23 mmHg    E/E' ratio 8.80 m/s    Triscuspid Valve Regurgitation Peak Gradient 19 mmHg    BSA 2.47 m2    LV Systolic Volume Index 17.8 mL/m2    LV Diastolic Volume Index 45.18 mL/m2    LA Volume Index 49.4 mL/m2    LV Mass Index 53 g/m2    Right Atrial Pressure (from IVC) 3 mmHg    TV rest pulmonary artery pressure 22 mmHg    Narrative    · Normal left ventricular systolic function. The estimated ejection   fraction is 60%.  · Severe left atrial enlargement.  · Grade I (mild) left ventricular diastolic dysfunction consistent with   impaired relaxation.  · Normal right ventricular systolic function.  · Mild right atrial enlargement.  · Mild aortic valve stenosis. Aortic valve area is 1.83 cm2; peak velocity   is 2.42 m/s; mean gradient is 12 mmHg.  · Mild tricuspid regurgitation.  · Normal central venous pressure (3 mmHg).  · The estimated PA systolic pressure is 22 mmHg.          REENA:  No results found for this or any previous visit.    ASSESSMENT/PLAN:         Anesthesia Evaluation    I have reviewed the Patient Summary Reports.    I have reviewed the Nursing Notes. I have reviewed the NPO Status.    I have reviewed the Medications.     Review of Systems  Anesthesia Hx:  No problems with previous Anesthesia  History of prior surgery of interest to airway management or planning: Denies Family Hx of Anesthesia complications.   Denies Personal Hx of Anesthesia complications.   Social:  Non-Smoker    Hematology/Oncology:  Hematology Normal   Oncology Normal     EENT/Dental:EENT/Dental Normal   Cardiovascular:   Hypertension    Pulmonary:  Pulmonary Normal    Renal/:  Renal/ Normal     Hepatic/GI:   GERD    Musculoskeletal:  Musculoskeletal Normal    Neurological:  Neurology Normal    Endocrine:  Endocrine Normal    Dermatological:  Skin Normal    Psych:  Psychiatric Normal           Physical Exam  General:  Well nourished, Morbid Obesity    Airway/Jaw/Neck:  Airway Findings: Mouth Opening: Normal Tongue: Normal  General Airway Assessment: Adult  Mallampati: II  Improves to II with phonation.  TM Distance: Normal, at least 6 cm  Jaw/Neck Findings:  Neck ROM: Normal ROM      Dental:  Dental Findings: Upper Dentures, Lower Dentures, Edentulous   Chest/Lungs:  Chest/Lungs Findings: Clear to auscultation     Heart/Vascular:  Heart Findings: Rate: Normal  Rhythm: Regular Rhythm  Sounds: Normal        Mental Status:  Mental Status Findings:  Cooperative, Alert and Oriented         Anesthesia Plan  Type of Anesthesia, risks & benefits discussed:  Anesthesia Type:  general  Patient's Preference:   Intra-op Monitoring Plan: standard ASA monitors  Intra-op Monitoring Plan Comments:   Post Op Pain Control Plan: multimodal analgesia, IV/PO Opioids PRN and per primary service following discharge from PACU  Post Op Pain Control Plan Comments:   Induction:   IV  Beta Blocker:  Patient is on a Beta-Blocker and has received one dose within the past 24 hours (No further documentation required).       Informed Consent: Patient understands risks and agrees with Anesthesia plan.  Questions answered. Anesthesia consent signed with  patient.  ASA Score: 3     Day of Surgery Review of History & Physical:    H&P update referred to the surgeon.         Ready For Surgery From Anesthesia Perspective.

## 2020-08-03 NOTE — PLAN OF CARE
Plan of care reviewed with pt, who verbalized understanding. Pt w/ woundvac in place - serosanguinous draiange. Old hemovac site covered w/ gauze/tape. Pt tolerating regular diet. Pain remains, but pt states feeling much better after the procedure. Pt is ambulating independently to the BR w/ AUO. Call bell in reach, bed locked in lowest position. Frequent rounds made for pt safety. AAOx4, VSS, will continue to monitor.

## 2020-08-03 NOTE — OP NOTE
Surgery Date: 8/3/2020      Surgeon(s) and Role:     * SEMAJ Márquez III, MD - Primary     * Uriel Conner MD - Fellow     Assisting Surgeon: None     Pre-op Diagnosis:  L femoral wound dehisence     Post-op Diagnosis:   Same     Procedure(s) (LRB):  WASHOUT (Left)  APPLICATION, WOUND VAC (Left)     Anesthesia: General     Description of Procedure: Vessels not exposed     Description of the findings of the procedure: no gross purulence     Estimated Blood Loss: <10cc         Specimens:       Specimen (12h ago, onward)     None     Indications for Procedure:  Vicky Alvarado is a 55 y.o. female who underwent repair of iatrogenic left SFA branch injury and evacuation of hematoma 7/21/20; she had a well healing wound until this past weekend, when she noted increased drainage and wound separation. The patient was offered washout and VAC application. All risks, benefits, and alternatives were discussed. The patient understood and wished to proceed.     Description of procedure:  Patient identified in the preoperative holding area. The patient was taken to the OR and placed supine on the operating room table. Anesthesia was induced. The groin was prepped and draped in the usual standard fashion. A surgical pause was conducted confirming correct patient, correct site, correct pre-operative preparations.    Fibrinous exudate and a partially dehisced wound was encountered. The existing, exposed suture material was completely removed from the wound. The wound was explored. The femoral vessels were covered by subcutaneous fat. Cultures were taken. The wound was copiously irrigated. The skin edges were sharply debrided. The final wound measured roughly 8cm deep by 3 cm wide by 12cm long. A black sponge was cut to fit, placed within the wound, and secured with occlusive dressing.    We will plan to return to the OR for plastic surgery evaluation and change under anesthesia Thursday.    Dr. Márquez was present for  all aspects of the case.    All instrument and sponge counts were confirmed correct x 2.    Uriel Conner MD PGY6  Vascular and Endovascular Surgery Fellow

## 2020-08-03 NOTE — PROGRESS NOTES
Transport request placed in [facility] for patient to arrive to [place] [id number] via wheelchair. Awaiting patient's arrival.

## 2020-08-03 NOTE — BRIEF OP NOTE
[facility]  Brief Operative Note    SUMMARY     Surgery Date: 8/3/2020     Surgeon(s) and Role:     * SEMAJ Márquez III, MD - Primary     * Uriel Conner MD - Fellow    Assisting Surgeon: None    Pre-op Diagnosis:  L femoral wound dehisence    Post-op Diagnosis:   Same    Procedure(s) (LRB):  WASHOUT (Left)  APPLICATION, WOUND VAC (Left)    Anesthesia: General    Description of Procedure: Vessels not exposed    Description of the findings of the procedure: no gross purulence    Estimated Blood Loss: <10cc         Specimens:   Specimen (12h ago, onward)    None

## 2020-08-03 NOTE — ASSESSMENT & PLAN NOTE
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 This high risk left femoral incision was closed primarily with 2 large drains  And a Preventa VAC placed.  One drain was removed prior to her discharge postoperative day 3. On Tuesday 7/28/20 she returned to the office with adequate pain control, the wound vac was removed.    8/2/20, she presented to the ER with reported wound dehiscence and increased drainage.    - OR today for wound washout and wound vac placement  - Will likely require home wound vac

## 2020-08-03 NOTE — TRANSFER OF CARE
"Anesthesia Transfer of Care Note    Patient: Vicky Alvarado    Procedure(s) Performed: Procedure(s) (LRB):  WASHOUT (Left)  APPLICATION, WOUND VAC (Left)    Patient location: PACU    Anesthesia Type: general    Transport from OR: Transported from OR on 6-10 L/min O2 by face mask with adequate spontaneous ventilation    Post pain: adequate analgesia    Post assessment: no apparent anesthetic complications    Post vital signs: stable    Level of consciousness: responds to stimulation    Nausea/Vomiting: no nausea/vomiting    Complications: none    Transfer of care protocol was followed      Last vitals:   Visit Vitals  BP (!) 118/56 (BP Location: Right arm, Patient Position: Lying)   Pulse 60   Temp 36.7 °C (98.1 °F) (Temporal)   Resp 18   Ht 5' 6" (1.676 m)   Wt (!) 138.3 kg (304 lb 14.3 oz)   LMP 06/08/2015   SpO2 98%   Breastfeeding No   BMI 49.21 kg/m²     "

## 2020-08-03 NOTE — PROGRESS NOTES
[facility]  Vascular Surgery  Progress Note    Patient Name: Vicky Alvarado  MRN: [id number]  Admission Date: 8/2/2020  Primary Care Provider: Gordy Cordero MD    Subjective:     Interval History: Admitted yesterday evening. OR today.     Post-Op Info:  Procedure(s) (LRB):  WASHOUT (Left)           Medications:  Continuous Infusions:   sodium chloride 0.9% 125 mL/hr at 08/02/20 1816     Scheduled Meds:   acetaminophen  650 mg Oral Q6H    DULoxetine  120 mg Oral Daily    heparin (porcine)  7,500 Units Subcutaneous Q8H    hydroCHLOROthiazide  50 mg Oral Daily    lisinopriL  40 mg Oral Daily    metoprolol succinate  50 mg Oral BID     PRN Meds:morphine, oxyCODONE, sodium chloride 0.9%     Objective:     Vital Signs (Most Recent):  Temp: 97.6 °F (36.4 °C) (08/03/20 0413)  Pulse: (!) 59 (08/03/20 0657)  Resp: 16 (08/03/20 0526)  BP: (!) 135/59 (08/03/20 0413)  SpO2: 100 % (08/03/20 0413) Vital Signs (24h Range):  Temp:  [97.6 °F (36.4 °C)-99.2 °F (37.3 °C)] 97.6 °F (36.4 °C)  Pulse:  [58-92] 59  Resp:  [10-20] 16  SpO2:  [95 %-100 %] 100 %  BP: (103-146)/(53-77) 135/59         Physical Exam  Vitals signs and nursing note reviewed.   Constitutional:       Appearance: Normal appearance.   Musculoskeletal:      Comments: Left groin incision dressed. Left leg marked for OR.    Neurological:      Mental Status: She is alert.   Psychiatric:         Mood and Affect: Mood normal.         Behavior: Behavior normal.         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 This high risk left femoral incision was closed primarily with 2 large drains  And a Preventa VAC placed.  One drain was removed prior to her discharge postoperative day 3. On Tuesday 7/28/20 she returned to the office with adequate pain control, the wound vac was removed.    8/2/20, she presented to the ER with reported wound dehiscence and increased drainage.    - OR today for wound washout and wound vac placement  - Will likely require home wound vac           Eladia Curiel MD  Vascular Surgery  [facility]

## 2020-08-03 NOTE — PLAN OF CARE
[facility]    HOME HEALTH ORDERS  FACE TO FACE ENCOUNTER    Patient Name: Vicky Alvarado  YOB: 1965    PCP: Gordy Cordero MD   PCP Address: [address] / [place] [address]  PCP Phone Number: [phone]  PCP Fax: [phone]    Encounter Date: 08/03/2020    Admit to Home Health    Diagnoses:  Active Hospital Problems    Diagnosis  POA    Wound dehiscence [T81.30XA]  Yes      Resolved Hospital Problems   No resolved problems to display.       Future Appointments   Date Time Provider Department Center   8/4/2020  3:45 PM SEMAJ Márquez III, MD [facility] VASCSUR [facility]   8/27/2020  8:30 AM Adelaida Hall MD [facility] HAND [facility]   9/8/2020  2:30 PM Gunjan Doty MD [facility] SLEEP [place]   9/22/2020  3:30 PM Debora Harding MD [facility] BARIAT [facility]   1/29/2021  2:15 PM Gordy Cordero MD hospitals [facility]           I have seen and examined this patient face to face today. My clinical findings that support the need for the home health skilled services and home bound status are the following:  Medical restrictions requiring assistance of another human to leave home due to  Wound care needs.    Allergies:Review of patient's allergies indicates:  No Known Allergies    Diet: regular diet    Activities: activity as tolerated    Nursing:   SN to complete comprehensive assessment including routine vital signs. Instruct on disease process and s/s of complications to report to MD. Review/verify medication list sent home with the patient at time of discharge  and instruct patient/caregiver as needed. Frequency may be adjusted depending on start of care date.    Notify MD if SBP > 160 or < 90; DBP > 90 or < 50; HR > 120 or < 50; Temp > 101; Other:   Signs of infection       WOUND CARE ORDERS  yes:  Surgical Wound:  Location: left groin     Consult ET nurse        Apply the following to wound:   Wound Vac:   Location:  Left groin           Dressing changes  every Monday, Wednesday and Friday.        ET Consult        Medications: Review discharge medications with patient and family and provide education.      Current Discharge Medication List      CONTINUE these medications which have NOT CHANGED    Details   apixaban (ELIQUIS) 5 mg Tab Take 1 tablet (5 mg total) by mouth 2 (two) times daily.  Qty: 60 tablet, Refills: 6      b complex vitamins capsule Take 1 capsule by mouth once daily.      cephALEXin (KEFLEX) 500 MG capsule Take 1 capsule (500 mg total) by mouth 4 (four) times daily. for 7 days  Qty: 28 capsule, Refills: 0    Associated Diagnoses: Postoperative cellulitis of surgical wound      DULoxetine (CYMBALTA) 60 MG capsule Take 2 capsules (120 mg total) by mouth once daily.  Qty: 180 capsule, Refills: 3      furosemide (LASIX) 20 MG tablet Take 1 tablet (20 mg total) by mouth daily as needed (fluid).  Qty: 30 tablet, Refills: 6    Associated Diagnoses: Leg edema      gluc-[facility]#5-O-guaa-reymundo-bor 750 mg-644 mg- 30 mg-1 mg Tab Take 1 tablet by mouth once daily.       hydroCHLOROthiazide (HYDRODIURIL) 50 MG tablet Please hold (do not take) until seen by Primary Care Physician  Qty: 90 tablet, Refills: 5    Comments: .      HYDROcodone-acetaminophen (NORCO) 5-325 mg per tablet Take 1 tablet by mouth every 6 (six) hours as needed for Pain.  Qty: 20 tablet, Refills: 0      krill/om-3/dha/epa/phospho/ast (MEGARED OMEGA-3 KRILL OIL ORAL)       lisinopriL (PRINIVIL,ZESTRIL) 40 MG tablet Please hold (do not take) until seen by Primary Care Physician  Qty: 90 tablet, Refills: 5      MELATONIN ORAL Take 1-2 tablets by mouth nightly as needed (Sleep).      metoprolol succinate (TOPROL-XL) 50 MG 24 hr tablet Take 1 tablet (50 mg total) by mouth 2 (two) times daily.  Qty: 90 tablet, Refills: 6    Comments: .      multivitamin (THERAGRAN) per tablet Take 1 tablet by mouth once daily.      omeprazole (PRILOSEC) 20 MG capsule TAKE 1 CAPSULE BY MOUTH ONCE DAILY  Qty: 90  capsule, Refills: 3      ondansetron (ZOFRAN) 4 MG tablet Take 1 tablet (4 mg total) by mouth every 8 (eight) hours as needed for Nausea.  Qty: 30 tablet, Refills: 0      traMADoL (ULTRAM) 50 mg tablet Take 1 tablet (50 mg total) by mouth every 8 (eight) hours as needed for Pain.  Qty: 15 tablet, Refills: 0    Comments: Quantity prescribed more than 7 day supply? No      traZODone (DESYREL) 100 MG tablet Take 1 tablet (100 mg total) by mouth nightly as needed for Insomnia.  Qty: 90 tablet, Refills: 3             I certify that this patient is confined to her home and needs intermittent skilled nursing care.      Eladia Curiel MD  Vascular Surgery

## 2020-08-03 NOTE — PROGRESS NOTES
"VASCULAR SURGERY  Afternoon Progress Note    Assessment/Plan:  55 y.o. female with h/o urgent left SFA branch laceration repair, evacuation 20 cm hematoma 7/21/2020 s/p wound vac placement 8/3/2020 for wound dehiscence  - Plan for OR Thursday with plastics surgery for wound vac change    Subjective:  She is doing well, pain well controlled with PRN medications    Objective:  BP (!) 112/53 (BP Location: Left arm, Patient Position: Sitting)   Pulse 61   Temp 97.7 °F (36.5 °C) (Oral)   Resp 18   Ht 5' 6" (1.676 m)   Wt (!) 138.3 kg (304 lb 14.3 oz)   LMP 06/08/2015   SpO2 95%   Breastfeeding No   BMI 49.21 kg/m²   Left groin with wound vac in place, ss drainage in cannister  Dry gauze over left thigh drain site     Eladia Curiel MD  Vascular Surgery      "

## 2020-08-03 NOTE — SUBJECTIVE & OBJECTIVE
Medications:  Continuous Infusions:   sodium chloride 0.9% 125 mL/hr at 08/02/20 1816     Scheduled Meds:   acetaminophen  650 mg Oral Q6H    DULoxetine  120 mg Oral Daily    heparin (porcine)  7,500 Units Subcutaneous Q8H    hydroCHLOROthiazide  50 mg Oral Daily    lisinopriL  40 mg Oral Daily    metoprolol succinate  50 mg Oral BID     PRN Meds:morphine, oxyCODONE, sodium chloride 0.9%     Objective:     Vital Signs (Most Recent):  Temp: 97.6 °F (36.4 °C) (08/03/20 0413)  Pulse: (!) 59 (08/03/20 0657)  Resp: 16 (08/03/20 0526)  BP: (!) 135/59 (08/03/20 0413)  SpO2: 100 % (08/03/20 0413) Vital Signs (24h Range):  Temp:  [97.6 °F (36.4 °C)-99.2 °F (37.3 °C)] 97.6 °F (36.4 °C)  Pulse:  [58-92] 59  Resp:  [10-20] 16  SpO2:  [95 %-100 %] 100 %  BP: (103-146)/(53-77) 135/59         Physical Exam  Vitals signs and nursing note reviewed.   Constitutional:       Appearance: Normal appearance.   Musculoskeletal:      Comments: Left groin incision dressed. Left leg marked for OR.    Neurological:      Mental Status: She is alert.   Psychiatric:         Mood and Affect: Mood normal.         Behavior: Behavior normal.         Significant Labs:  CBC:   Recent Labs   Lab 08/03/20 0419   WBC 8.76   RBC 3.26*   HGB 8.9*   HCT 30.0*      MCV 92   MCH 27.3   MCHC 29.7*     CMP:   Recent Labs   Lab 08/03/20 0419   GLU 96   CALCIUM 8.8   ALBUMIN 3.1*   PROT 6.9   *   K 3.9   CO2 27   CL 99   BUN 14   CREATININE 1.0   ALKPHOS 81   ALT 17   AST 23   BILITOT 0.5       Significant Diagnostics:  None

## 2020-08-03 NOTE — PLAN OF CARE
Pt AAOX4. Pain managed with PRN pain meds. NPO since midnight for upcoming procedure, no complaints of nausea or vomiting. Ambulates. Adequate urine output overnight. Dressing to left groin changed. VS stable on room air. Tele. Pt slept between care. Bed low and locked, call bell within reach. Will continue to monitor.

## 2020-08-04 LAB
POC ACTIVATED CLOTTING TIME K: 241 SEC (ref 74–137)
POC ACTIVATED CLOTTING TIME K: 296 SEC (ref 74–137)
POC ACTIVATED CLOTTING TIME K: 307 SEC (ref 74–137)
POC ACTIVATED CLOTTING TIME K: 307 SEC (ref 74–137)
POC ACTIVATED CLOTTING TIME K: 357 SEC (ref 74–137)
POC ACTIVATED CLOTTING TIME K: 367 SEC (ref 74–137)
SAMPLE: ABNORMAL

## 2020-08-04 PROCEDURE — 25000003 PHARM REV CODE 250: Performed by: STUDENT IN AN ORGANIZED HEALTH CARE EDUCATION/TRAINING PROGRAM

## 2020-08-04 PROCEDURE — 63600175 PHARM REV CODE 636 W HCPCS: Performed by: STUDENT IN AN ORGANIZED HEALTH CARE EDUCATION/TRAINING PROGRAM

## 2020-08-04 PROCEDURE — 20600001 HC STEP DOWN PRIVATE ROOM

## 2020-08-04 RX ORDER — PANTOPRAZOLE SODIUM 40 MG/1
40 TABLET, DELAYED RELEASE ORAL DAILY
Status: DISCONTINUED | OUTPATIENT
Start: 2020-08-04 | End: 2020-08-07 | Stop reason: HOSPADM

## 2020-08-04 RX ORDER — POLYETHYLENE GLYCOL 3350 17 G/17G
17 POWDER, FOR SOLUTION ORAL DAILY
Status: DISCONTINUED | OUTPATIENT
Start: 2020-08-04 | End: 2020-08-07 | Stop reason: HOSPADM

## 2020-08-04 RX ORDER — ENOXAPARIN SODIUM 150 MG/ML
1 INJECTION SUBCUTANEOUS 2 TIMES DAILY
Status: DISCONTINUED | OUTPATIENT
Start: 2020-08-04 | End: 2020-08-07 | Stop reason: HOSPADM

## 2020-08-04 RX ORDER — TRAZODONE HYDROCHLORIDE 100 MG/1
100 TABLET ORAL NIGHTLY PRN
Status: DISCONTINUED | OUTPATIENT
Start: 2020-08-04 | End: 2020-08-07 | Stop reason: HOSPADM

## 2020-08-04 RX ORDER — ONDANSETRON 4 MG/1
4 TABLET, FILM COATED ORAL EVERY 6 HOURS PRN
Status: DISCONTINUED | OUTPATIENT
Start: 2020-08-04 | End: 2020-08-07 | Stop reason: HOSPADM

## 2020-08-04 RX ORDER — OXYCODONE HYDROCHLORIDE 5 MG/1
10 TABLET ORAL EVERY 6 HOURS PRN
Status: DISCONTINUED | OUTPATIENT
Start: 2020-08-04 | End: 2020-08-06

## 2020-08-04 RX ORDER — HYDROMORPHONE HYDROCHLORIDE 1 MG/ML
0.5 INJECTION, SOLUTION INTRAMUSCULAR; INTRAVENOUS; SUBCUTANEOUS EVERY 6 HOURS PRN
Status: DISCONTINUED | OUTPATIENT
Start: 2020-08-04 | End: 2020-08-06

## 2020-08-04 RX ADMIN — PANTOPRAZOLE SODIUM 40 MG: 40 TABLET, DELAYED RELEASE ORAL at 10:08

## 2020-08-04 RX ADMIN — SODIUM CHLORIDE: 0.9 INJECTION, SOLUTION INTRAVENOUS at 02:08

## 2020-08-04 RX ADMIN — POLYETHYLENE GLYCOL 3350 17 G: 17 POWDER, FOR SOLUTION ORAL at 10:08

## 2020-08-04 RX ADMIN — ENOXAPARIN SODIUM 135 MG: 150 INJECTION SUBCUTANEOUS at 12:08

## 2020-08-04 RX ADMIN — OXYCODONE HYDROCHLORIDE 10 MG: 10 TABLET ORAL at 11:08

## 2020-08-04 RX ADMIN — ACETAMINOPHEN 650 MG: 325 TABLET ORAL at 06:08

## 2020-08-04 RX ADMIN — HEPARIN SODIUM 7500 UNITS: 5000 INJECTION INTRAVENOUS; SUBCUTANEOUS at 06:08

## 2020-08-04 RX ADMIN — ONDANSETRON HYDROCHLORIDE 4 MG: 4 TABLET, FILM COATED ORAL at 11:08

## 2020-08-04 RX ADMIN — METOPROLOL SUCCINATE 50 MG: 50 TABLET, EXTENDED RELEASE ORAL at 10:08

## 2020-08-04 RX ADMIN — OXYCODONE HYDROCHLORIDE 5 MG: 5 TABLET ORAL at 06:08

## 2020-08-04 RX ADMIN — OXYCODONE HYDROCHLORIDE 10 MG: 10 TABLET ORAL at 06:08

## 2020-08-04 RX ADMIN — HYDROMORPHONE HYDROCHLORIDE 0.5 MG: 1 INJECTION, SOLUTION INTRAMUSCULAR; INTRAVENOUS; SUBCUTANEOUS at 10:08

## 2020-08-04 RX ADMIN — ACETAMINOPHEN 650 MG: 325 TABLET ORAL at 12:08

## 2020-08-04 RX ADMIN — LISINOPRIL 40 MG: 20 TABLET ORAL at 10:08

## 2020-08-04 RX ADMIN — DULOXETINE HYDROCHLORIDE 120 MG: 60 CAPSULE, DELAYED RELEASE ORAL at 10:08

## 2020-08-04 RX ADMIN — ACETAMINOPHEN 650 MG: 325 TABLET ORAL at 11:08

## 2020-08-04 RX ADMIN — METOPROLOL SUCCINATE 50 MG: 50 TABLET, EXTENDED RELEASE ORAL at 09:08

## 2020-08-04 RX ADMIN — HYDROMORPHONE HYDROCHLORIDE 0.5 MG: 1 INJECTION, SOLUTION INTRAMUSCULAR; INTRAVENOUS; SUBCUTANEOUS at 09:08

## 2020-08-04 RX ADMIN — HYDROCHLOROTHIAZIDE 50 MG: 25 TABLET ORAL at 10:08

## 2020-08-04 RX ADMIN — HYDROMORPHONE HYDROCHLORIDE 0.5 MG: 1 INJECTION, SOLUTION INTRAMUSCULAR; INTRAVENOUS; SUBCUTANEOUS at 03:08

## 2020-08-04 RX ADMIN — ENOXAPARIN SODIUM 135 MG: 150 INJECTION SUBCUTANEOUS at 09:08

## 2020-08-04 RX ADMIN — TRAZODONE HYDROCHLORIDE 100 MG: 100 TABLET ORAL at 09:08

## 2020-08-04 RX ADMIN — MORPHINE SULFATE 4 MG: 2 INJECTION, SOLUTION INTRAMUSCULAR; INTRAVENOUS at 04:08

## 2020-08-04 NOTE — PLAN OF CARE
08/04/20 1454   Post-Acute Status   Post-Acute Authorization HME   HME Status Referrals Sent   Sw faxed over [facility] wound vac form.    Noemí Hernández LMSW  [facility]  [id number]

## 2020-08-04 NOTE — SUBJECTIVE & OBJECTIVE
Medications:  Continuous Infusions:   sodium chloride 0.9% 125 mL/hr at 08/04/20 0233     Scheduled Meds:   acetaminophen  650 mg Oral Q6H    DULoxetine  120 mg Oral Daily    heparin (porcine)  7,500 Units Subcutaneous Q8H    hydroCHLOROthiazide  50 mg Oral Daily    lisinopriL  40 mg Oral Daily    metoprolol succinate  50 mg Oral BID     PRN Meds:morphine, oxyCODONE, sodium chloride 0.9%     Objective:     Vital Signs (Most Recent):  Temp: 97.7 °F (36.5 °C) (08/04/20 0402)  Pulse: (!) 59 (08/04/20 0646)  Resp: 16 (08/04/20 0626)  BP: (!) 122/58 (08/04/20 0402)  SpO2: 98 % (08/04/20 0402) Vital Signs (24h Range):  Temp:  [97.5 °F (36.4 °C)-98.8 °F (37.1 °C)] 97.7 °F (36.5 °C)  Pulse:  [56-76] 59  Resp:  [16-20] 16  SpO2:  [93 %-100 %] 98 %  BP: ()/(38-59) 122/58         Physical Exam  Vitals signs and nursing note reviewed.   Cardiovascular:      Rate and Rhythm: Normal rate.   Pulmonary:      Effort: Pulmonary effort is normal.   Musculoskeletal:      Comments: Left groin vac.          Significant Labs:  none      Significant Diagnostics:  none

## 2020-08-04 NOTE — PLAN OF CARE
CM spoke with patient for discharge planning assessment. Per patient she had [facility] prior to admission; she felt as though they did not benefit her. Depending on if she needs a wound vac or not on discharge will be a determining factor if she keeps the HH service. She also believes she would greatly benefit from a bariatric raised toilet seat. CM will continue to follow for needs. All questions addressed.      08/04/20 1420   Discharge Assessment   Assessment Type Discharge Planning Assessment   Confirmed/corrected address and phone number on facesheet? Yes   Assessment information obtained from? Patient   Expected Length of Stay (days) 5   Communicated expected length of stay with patient/caregiver yes   Prior to hospitilization cognitive status: Alert/Oriented   Prior to hospitalization functional status: Assistive Equipment   Current cognitive status: Alert/Oriented   Current Functional Status: Assistive Equipment   Lives With alone   Able to Return to Prior Arrangements yes   Is patient able to care for self after discharge? Yes   Who are your caregiver(s) and their phone number(s)? [facility] - the patient did not feel like this service did much for her   Patient's perception of discharge disposition home or selfcare   Readmission Within the Last 30 Days current reason for admission unrelated to previous admission   If yes, most [facility] [facility]   Patient currently being followed by outpatient case management? No   Patient currently receives any other outside agency services? Yes   Name and contact number of agency or person providing outside services [facility]   Is it the patient/care giver preference to resume care with the current outside agency? Other (comment)  (TBD, but currently she doesnt feel as though she benefitted from the service)   Equipment Currently Used at Home walker, standard;bedside commode;grab bar;CPAP;shower chair  (requests a raised toilet)   Do you have any problems  affording any of your prescribed medications? No   Is the patient taking medications as prescribed? yes   Does the patient have transportation home? Yes   Transportation Anticipated family or friend will provide   Does the patient receive services at the [facility] Clinic? No   Discharge Plan A Home   Discharge Plan B Home Health   DME Needed Upon Discharge  raised toilet   Patient/Family in Agreement with Plan yes   Readmission Questionnaire   At the time of your discharge, did someone talk to you about what your health problems were? Yes   At the time of discharge, did someone talk to you about what to watch out for regarding worsening of your health problem? Yes   At the time of discharge, did someone talk to you about what to do if you experienced worsening of your health problem? Yes   At the time of discharge, did someone talk to you about which medication to take when you left the hospital and which ones to stop taking? Yes   At the time of discharge, did someone talk to you about when and where to follow up with a doctor after you left the hospital? Yes   What do you believe caused you to be sick enough to be re-admitted? Wound dehiscence   Do you have problems taking your medications as prescribed? No   Do you have any problems affording any of  your prescribed medications? No   Does the patient have transportation to healthcare appointments? Yes   Living Arrangements house   Does the patient have family/friends to help with healtcare needs after discharge? yes              PCP:  Gordy Cordero MD        Pharmacy:    [facility] [address] [address]  Phone: [phone] Fax: [phone]    [facility] [place]  [address] [place] [address]  Phone: [phone] Fax: [phone]        Emergency Contacts:  Extended Emergency Contact Information  Primary Emergency Contact: Zully Arita  Address: [address] [place] [address]  [place]  Home Phone: [phone]  Mobile Phone: [phone]  Relation: Sister      Insurance:    Payor: BLUE CROSS [employer] / Plan: [employer] [facility] / Product Type: Self Funded /       08/04/2020  2:31 PM    Juanita Schneider RN, CM   Ext: [phone]

## 2020-08-04 NOTE — ASSESSMENT & PLAN NOTE
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 Was then discharged with drains but returned to ED 8/3 with wound breakdown. Taken to OR 8/3 for washout and vac placement. Doing well.     -follow up cultures, negative so far  -OR Thursday for washout  -follow up plastics recs  -regular diet  -home meds  -PRN pain meds  -DVT ppx

## 2020-08-04 NOTE — PLAN OF CARE
Plan of care reviewed with pt: CYNDEEOx4, on RA, VS stable. Groin incision with WV at -125 put out 150cc during shift. Complained of her groin incision all the time, pain under control with Oxycodone and Dilaudid given around the clock. Voided with adequate amount clear yellow urine. Had 1 BM. Ambulated in room and in the hood independently. Call light in reach. TM.

## 2020-08-04 NOTE — PLAN OF CARE
Plan of care reviewed with pt. Verbalized understanding. Pt AAOx4. Pt on tele running SB-NSR. VS stable on RA. Pain managed with PRN medicine.     L groin wound w/ Wound vac is intact. Wound vac suction continuously at 125mmHg. Second L groin site dressing has small amount of tan drainage on it. Redressed w/ guaze. L leg has mild bruising on thigh and around to buttocks.     Pt ambulates to BR, adequate output. Passing gas no BM this shift.  Pt tolerating regular die. Nausea managed w/ a one time dose of Zofran. Frequent rounds made for pt safety. Bed low and locked, call light in reach. WCTM

## 2020-08-04 NOTE — PROGRESS NOTES
[facility]  Vascular Surgery  Progress Note    Patient Name: Vicky Alvarado  MRN: [id number]  Admission Date: 8/2/2020  Primary Care Provider: Gordy Cordero MD    Subjective:     Interval History: no acute events overnight.     Post-Op Info:  Procedure(s) (LRB):  WASHOUT (Left)  APPLICATION, WOUND VAC (Left)   1 Day Post-Op       Medications:  Continuous Infusions:   sodium chloride 0.9% 125 mL/hr at 08/04/20 0233     Scheduled Meds:   acetaminophen  650 mg Oral Q6H    DULoxetine  120 mg Oral Daily    heparin (porcine)  7,500 Units Subcutaneous Q8H    hydroCHLOROthiazide  50 mg Oral Daily    lisinopriL  40 mg Oral Daily    metoprolol succinate  50 mg Oral BID     PRN Meds:morphine, oxyCODONE, sodium chloride 0.9%     Objective:     Vital Signs (Most Recent):  Temp: 97.7 °F (36.5 °C) (08/04/20 0402)  Pulse: (!) 59 (08/04/20 0646)  Resp: 16 (08/04/20 0626)  BP: (!) 122/58 (08/04/20 0402)  SpO2: 98 % (08/04/20 0402) Vital Signs (24h Range):  Temp:  [97.5 °F (36.4 °C)-98.8 °F (37.1 °C)] 97.7 °F (36.5 °C)  Pulse:  [56-76] 59  Resp:  [16-20] 16  SpO2:  [93 %-100 %] 98 %  BP: ()/(38-59) 122/58         Physical Exam  Vitals signs and nursing note reviewed.   Cardiovascular:      Rate and Rhythm: Normal rate.   Pulmonary:      Effort: Pulmonary effort is normal.   Musculoskeletal:      Comments: Left groin vac.          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 Was then discharged with drains but returned to ED 8/3 with wound breakdown. Taken to OR 8/3 for washout and vac placement. Doing well.     -follow up cultures, negative so far  -OR Thursday for washout  -follow up plastics recs  -regular diet  -home meds  -PRN pain meds  -DVT ppx           Elvin Wood MD  Vascular Surgery  [facility]  [place]

## 2020-08-05 ENCOUNTER — ANESTHESIA EVENT (OUTPATIENT)
Dept: SURGERY | Facility: HOSPITAL | Age: 55
DRG: 902 | End: 2020-08-05
Payer: COMMERCIAL

## 2020-08-05 LAB
BACTERIA SPEC AEROBE CULT: ABNORMAL
BACTERIA SPEC AEROBE CULT: ABNORMAL
SARS-COV-2 RNA RESP QL NAA+PROBE: NOT DETECTED

## 2020-08-05 PROCEDURE — 20600001 HC STEP DOWN PRIVATE ROOM

## 2020-08-05 PROCEDURE — S0030 INJECTION, METRONIDAZOLE: HCPCS | Performed by: STUDENT IN AN ORGANIZED HEALTH CARE EDUCATION/TRAINING PROGRAM

## 2020-08-05 PROCEDURE — 63600175 PHARM REV CODE 636 W HCPCS: Performed by: STUDENT IN AN ORGANIZED HEALTH CARE EDUCATION/TRAINING PROGRAM

## 2020-08-05 PROCEDURE — U0003 INFECTIOUS AGENT DETECTION BY NUCLEIC ACID (DNA OR RNA); SEVERE ACUTE RESPIRATORY SYNDROME CORONAVIRUS 2 (SARS-COV-2) (CORONAVIRUS DISEASE [COVID-19]), AMPLIFIED PROBE TECHNIQUE, MAKING USE OF HIGH THROUGHPUT TECHNOLOGIES AS DESCRIBED BY CMS-2020-01-R: HCPCS

## 2020-08-05 PROCEDURE — 25000003 PHARM REV CODE 250: Performed by: STUDENT IN AN ORGANIZED HEALTH CARE EDUCATION/TRAINING PROGRAM

## 2020-08-05 RX ORDER — METRONIDAZOLE 500 MG/100ML
500 INJECTION, SOLUTION INTRAVENOUS
Status: DISCONTINUED | OUTPATIENT
Start: 2020-08-05 | End: 2020-08-07 | Stop reason: HOSPADM

## 2020-08-05 RX ORDER — CIPROFLOXACIN 2 MG/ML
400 INJECTION, SOLUTION INTRAVENOUS
Status: DISCONTINUED | OUTPATIENT
Start: 2020-08-05 | End: 2020-08-07 | Stop reason: HOSPADM

## 2020-08-05 RX ADMIN — HYDROMORPHONE HYDROCHLORIDE 0.5 MG: 1 INJECTION, SOLUTION INTRAMUSCULAR; INTRAVENOUS; SUBCUTANEOUS at 03:08

## 2020-08-05 RX ADMIN — ONDANSETRON HYDROCHLORIDE 4 MG: 4 TABLET, FILM COATED ORAL at 09:08

## 2020-08-05 RX ADMIN — OXYCODONE HYDROCHLORIDE 10 MG: 10 TABLET ORAL at 03:08

## 2020-08-05 RX ADMIN — ACETAMINOPHEN 650 MG: 325 TABLET ORAL at 06:08

## 2020-08-05 RX ADMIN — TRAZODONE HYDROCHLORIDE 100 MG: 100 TABLET ORAL at 09:08

## 2020-08-05 RX ADMIN — DULOXETINE HYDROCHLORIDE 120 MG: 60 CAPSULE, DELAYED RELEASE ORAL at 08:08

## 2020-08-05 RX ADMIN — METRONIDAZOLE 500 MG: 500 INJECTION, SOLUTION INTRAVENOUS at 05:08

## 2020-08-05 RX ADMIN — ACETAMINOPHEN 650 MG: 325 TABLET ORAL at 12:08

## 2020-08-05 RX ADMIN — LISINOPRIL 40 MG: 20 TABLET ORAL at 08:08

## 2020-08-05 RX ADMIN — METRONIDAZOLE 500 MG: 500 INJECTION, SOLUTION INTRAVENOUS at 08:08

## 2020-08-05 RX ADMIN — HYDROMORPHONE HYDROCHLORIDE 0.5 MG: 1 INJECTION, SOLUTION INTRAMUSCULAR; INTRAVENOUS; SUBCUTANEOUS at 09:08

## 2020-08-05 RX ADMIN — METOPROLOL SUCCINATE 50 MG: 50 TABLET, EXTENDED RELEASE ORAL at 09:08

## 2020-08-05 RX ADMIN — POLYETHYLENE GLYCOL 3350 17 G: 17 POWDER, FOR SOLUTION ORAL at 08:08

## 2020-08-05 RX ADMIN — HYDROMORPHONE HYDROCHLORIDE 0.5 MG: 1 INJECTION, SOLUTION INTRAMUSCULAR; INTRAVENOUS; SUBCUTANEOUS at 07:08

## 2020-08-05 RX ADMIN — ENOXAPARIN SODIUM 135 MG: 150 INJECTION SUBCUTANEOUS at 09:08

## 2020-08-05 RX ADMIN — CIPROFLOXACIN 400 MG: 2 INJECTION, SOLUTION INTRAVENOUS at 05:08

## 2020-08-05 RX ADMIN — ONDANSETRON HYDROCHLORIDE 4 MG: 4 TABLET, FILM COATED ORAL at 03:08

## 2020-08-05 RX ADMIN — OXYCODONE HYDROCHLORIDE 10 MG: 10 TABLET ORAL at 06:08

## 2020-08-05 RX ADMIN — OXYCODONE HYDROCHLORIDE 10 MG: 10 TABLET ORAL at 10:08

## 2020-08-05 RX ADMIN — PANTOPRAZOLE SODIUM 40 MG: 40 TABLET, DELAYED RELEASE ORAL at 08:08

## 2020-08-05 RX ADMIN — METOPROLOL SUCCINATE 50 MG: 50 TABLET, EXTENDED RELEASE ORAL at 08:08

## 2020-08-05 RX ADMIN — ACETAMINOPHEN 650 MG: 325 TABLET ORAL at 05:08

## 2020-08-05 RX ADMIN — ENOXAPARIN SODIUM 135 MG: 150 INJECTION SUBCUTANEOUS at 08:08

## 2020-08-05 RX ADMIN — HYDROCHLOROTHIAZIDE 50 MG: 25 TABLET ORAL at 08:08

## 2020-08-05 RX ADMIN — CIPROFLOXACIN 400 MG: 2 INJECTION, SOLUTION INTRAVENOUS at 08:08

## 2020-08-05 NOTE — PLAN OF CARE
Problem: Fall Injury Risk  Goal: Absence of Fall and Fall-Related Injury  Outcome: Ongoing, Progressing     Problem: Adult Inpatient Plan of Care  Goal: Plan of Care Review  Outcome: Ongoing, Progressing  Goal: Patient-Specific Goal (Individualization)  Outcome: Ongoing, Progressing  Goal: Absence of Hospital-Acquired Illness or Injury  Outcome: Ongoing, Progressing  Goal: Optimal Comfort and Wellbeing  Outcome: Ongoing, Progressing  Goal: Readiness for Transition of Care  Outcome: Ongoing, Progressing  Goal: Rounds/Family Conference  Outcome: Ongoing, Progressing     Problem: Bariatric Environmental Safety  Goal: Safety Maintained with Care  Outcome: Ongoing, Progressing

## 2020-08-05 NOTE — ANESTHESIA PREPROCEDURE EVALUATION
[facility]  Anesthesia Pre-Operative Evaluation         Patient Name: Vicky Alvarado  YOB: 1965  MRN: [id number]    SUBJECTIVE:     Pre-operative evaluation for Procedure(s) (LRB):  WASHOUT - left groin VAC change Possible flap coverage with Dr. Hamilton (Left)     08/05/2020    Vicky Alvarado is a 55 y.o. female w/ a significant PMHx of HFpEF (EF 60%), Afib, obesity, Mild AS, recently admitted for ablation with hospital course c/b left groin hematoma requiring emergent femoral exploration. Patient underwent washout w/ application of wound vac on 8/3/2020 w/ LMA. No anesthetic complications noted. Currently on cipro and flagyl.     Patient now presents for the above procedure(s).      LDA:        Peripheral IV - Single Lumen 08/05/20 0838 20 G Anterior;Proximal;Right Forearm (Active)   Site Assessment Clean;Dry;Intact;No redness;No swelling 08/05/20 0838   Line Status Blood return noted;Saline locked;Flushed 08/05/20 0838   Dressing Status Clean;Dry;Intact 08/05/20 0838   Dressing Intervention First dressing 08/05/20 0838   Site Change Due 08/09/20 08/05/20 0838   Reason Not Rotated Not due 08/05/20 0838   Number of days: 0       Prev airway:   Placement Date: 07/21/20; Placement Time: 0337 (created via procedure documentation); Method of Intubation: Direct laryngoscopy; Mask Ventilation: Easy; Intubated: Postinduction; Blade: Ng #2; Airway Device Size: 7.5; Placement Verified By: Capnometry; Complicating Factors: None; Intubation Findings: Bilateral breath sounds; Securment: Lips; Complications: None; Removal Date: 07/21/20;  Removal Time: 0626    Drips: None documented.      Patient Active Problem List   Diagnosis    Opiate dependence    Opioid dependence in remission    Hypertension    Chronic diastolic heart failure    Atrial Fibrillation (paroxysmal)     Cardiomyopathy    Avascular necrosis of lunate    Body mass index (BMI) 45.0-49.9, adult    History of opioid abuse    Severe obesity (BMI >= 40)    Pre-diabetes    Right carpal tunnel syndrome    Weakness of right hand    ERENDIRA (obstructive sleep apnea)    Atrial fibrillation    Hematoma of groin    Hematoma    Depression    Wound dehiscence       Review of patient's allergies indicates:  No Known Allergies    Current Inpatient Medications:      Current Facility-Administered Medications on File Prior to Encounter   Medication Dose Route Frequency Provider Last Rate Last Dose    0.9%  NaCl infusion   Intravenous Continuous Derrick Bandar Dior MD 0 mL/hr at 07/20/20 1451      acetaminophen tablet 650 mg  650 mg Oral Q6H Uriel Conner MD   650 mg at 08/05/20 1221    ciprofloxacin (CIPRO)400mg/200ml D5W IVPB 400 mg  400 mg Intravenous Q8H Uriel Conner  mL/hr at 08/05/20 0858 400 mg at 08/05/20 0858    DULoxetine DR capsule 120 mg  120 mg Oral Daily Uriel Conner MD   120 mg at 08/05/20 0857    enoxaparin injection 135 mg  1 mg/kg Subcutaneous BID Elvin Wood MD   135 mg at 08/05/20 0857    hydroCHLOROthiazide tablet 50 mg  50 mg Oral Daily Uriel Conner MD   50 mg at 08/05/20 0856    HYDROmorphone injection 0.5 mg  0.5 mg Intravenous Q6H PRN Elvin Wood MD   0.5 mg at 08/05/20 0713    lisinopriL tablet 40 mg  40 mg Oral Daily Uriel Conner MD   40 mg at 08/05/20 0857    metoprolol succinate (TOPROL-XL) 24 hr tablet 50 mg  50 mg Oral BID Uriel Conner MD   50 mg at 08/05/20 0857    metronidazole IVPB 500 mg  500 mg Intravenous Q8H Uriel Conner  mL/hr at 08/05/20 0858 500 mg at 08/05/20 0858    ondansetron tablet 4 mg  4 mg Oral Q6H PRN Puja French MD   4 mg at 08/04/20 2307    oxyCODONE immediate release tablet 5 mg  5 mg Oral Q6H PRN Elvin Wood MD   5 mg at 08/04/20 0626    oxyCODONE immediate release tablet Tab 10 mg  10 mg Oral Q6H PRN Elvin  MD [place]   10 mg at 08/05/20 1033    pantoprazole EC tablet 40 mg  40 mg Oral Daily Elvin Wood MD   40 mg at 08/05/20 0857    polyethylene glycol packet 17 g  17 g Oral Daily Elvin Wood MD   17 g at 08/05/20 0856    sodium chloride 0.9% flush 10 mL  10 mL Intravenous PRN Uriel Conner MD        traZODone tablet 100 mg  100 mg Oral Nightly PRN Elvin Wood MD   100 mg at 08/04/20 8417     Current Outpatient Medications on File Prior to Encounter   Medication Sig Dispense Refill    apixaban (ELIQUIS) 5 mg Tab Take 1 tablet (5 mg total) by mouth 2 (two) times daily. 60 tablet 6    b complex vitamins capsule Take 1 capsule by mouth once daily.      cephALEXin (KEFLEX) 500 MG capsule Take 1 capsule (500 mg total) by mouth 4 (four) times daily. for 7 days 28 capsule 0    DULoxetine (CYMBALTA) 60 MG capsule Take 2 capsules (120 mg total) by mouth once daily. 180 capsule 3    furosemide (LASIX) 20 MG tablet Take 1 tablet (20 mg total) by mouth daily as needed (fluid). 30 tablet 6    gluc-shikha-[id number]-reymundo-bor 750 mg-644 mg- 30 mg-1 mg Tab Take 1 tablet by mouth once daily.       hydroCHLOROthiazide (HYDRODIURIL) 50 MG tablet Please hold (do not take) until seen by Primary Care Physician 90 tablet 5    HYDROcodone-acetaminophen (NORCO) 5-325 mg per tablet Take 1 tablet by mouth every 6 (six) hours as needed for Pain. 20 tablet 0    krill/om-3/dha/epa/phospho/ast (MEGARED OMEGA-3 KRILL OIL ORAL)       lisinopriL (PRINIVIL,ZESTRIL) 40 MG tablet Please hold (do not take) until seen by Primary Care Physician 90 tablet 5    MELATONIN ORAL Take 1-2 tablets by mouth nightly as needed (Sleep).      metoprolol succinate (TOPROL-XL) 50 MG 24 hr tablet Take 1 tablet (50 mg total) by mouth 2 (two) times daily. 90 tablet 6    multivitamin (THERAGRAN) per tablet Take 1 tablet by mouth once daily.      omeprazole (PRILOSEC) 20 MG capsule TAKE 1 CAPSULE BY MOUTH ONCE DAILY 90 capsule 3    ondansetron (ZOFRAN) 4 MG  tablet Take 1 tablet (4 mg total) by mouth every 8 (eight) hours as needed for Nausea. 30 tablet 0    traMADoL (ULTRAM) 50 mg tablet Take 1 tablet (50 mg total) by mouth every 8 (eight) hours as needed for Pain. 15 tablet 0    traZODone (DESYREL) 100 MG tablet Take 1 tablet (100 mg total) by mouth nightly as needed for Insomnia. 90 tablet 3       Past Surgical History:   Procedure Laterality Date    ABLATION OF ARRHYTHMOGENIC FOCUS FOR ATRIAL FIBRILLATION N/A 7/20/2020    Procedure: Ablation atrial fibrillation;  Surgeon: Gabriel Hawley MD;  Location: [facility] EP LAB;  Service: Cardiology;  Laterality: N/A;  afib, PVI, RFA, DEMI, SHADY, fred, MB, 3 Prep    APPLICATION OF WOUND VACUUM-ASSISTED CLOSURE DEVICE Left 8/3/2020    Procedure: APPLICATION, WOUND VAC;  Surgeon: SEMAJ Márquez III, MD;  Location: [address];  Service: Peripheral Vascular;  Laterality: Left;    CARPAL TUNNEL RELEASE Right 6/10/2020    Procedure: RELEASE, CARPAL TUNNEL - RIGHT;  Surgeon: Adelaida Hall MD;  Location: [facility];  Service: Orthopedics;  Laterality: Right;  GENERAL AND REGIONAL    EXPLORATION OF FEMORAL ARTERY Left 7/21/2020    Procedure: EXPLORATION, ARTERY, FEMORAL;  Surgeon: SEMAJ Márquez III, MD;  Location: [address];  Service: Peripheral Vascular;  Laterality: Left;  1. Urgent Direct repair, L SFA branch laceration    FOOT SURGERY      TREATMENT OF CARDIAC ARRHYTHMIA N/A 1/29/2020    Procedure: CARDIOVERSION;  Surgeon: Gabriel Hawley MD;  Location: [facility] EP LAB;  Service: Cardiology;  Laterality: N/A;  af, demi, dccv, fred, mb, 345       Social History     Socioeconomic History    Marital status: Single     Spouse name: Not on file    Number of children: Not on file    Years of education: Not on file    Highest education level: Not on file   Occupational History    Not on file   Social Needs    Financial resource strain: Not on file    Food insecurity     Worry: Not on file     Inability: Not  on file    Transportation needs     Medical: Not on file     Non-medical: Not on file   Tobacco Use    Smoking status: Former Smoker     Types: Cigarettes     Quit date: 2019     Years since quittin.0    Smokeless tobacco: Never Used   Substance and Sexual Activity    Alcohol use: No    Drug use: No    Sexual activity: Not on file   Lifestyle    Physical activity     Days per week: Not on file     Minutes per session: Not on file    Stress: Not on file   Relationships    Social connections     Talks on phone: Not on file     Gets together: Not on file     Attends Cheondoism service: Not on file     Active member of club or organization: Not on file     Attends meetings of clubs or organizations: Not on file     Relationship status: Not on file   Other Topics Concern    Patient feels they ought to cut down on drinking/drug use Not Asked    Patient annoyed by others criticizing their drinking/drug use Not Asked    Patient has felt bad or guilty about drinking/drug use Not Asked    Patient has had a drink/used drugs as an eye opener in the AM Not Asked   Social History Narrative    Not on file       OBJECTIVE:     Vital Signs Range (Last 24H):  Temp:  [36.4 °C (97.6 °F)-36.9 °C (98.4 °F)]   Pulse:  [55-68]   Resp:  [17-20]   BP: (113-149)/(56-79)   SpO2:  [96 %-98 %]       Significant Labs:  Lab Results   Component Value Date    WBC 8.76 2020    HGB 8.9 (L) 2020    HCT 30.0 (L) 2020     2020    CHOL 209 (H) 2020    TRIG 191 (H) 2020    HDL 43 2020    ALT 17 2020    AST 23 2020     (L) 2020    K 3.9 2020    CL 99 2020    CREATININE 1.0 2020    BUN 14 2020    CO2 27 2020    TSH 1.225 2020    INR 1.0 2020    HGBA1C 7.1 (H) 2020       Diagnostic Studies: No relevant studies.    EKG:   Results for orders placed or performed during the hospital encounter of 20   EKG 12-lead     Collection Time: 08/02/20  1:41 PM    Narrative    Test Reason : I48.91,    Vent. Rate : 075 BPM     Atrial Rate : 075 BPM     P-R Int : 160 ms          QRS Dur : 084 ms      QT Int : 362 ms       P-R-T Axes : 042 043 038 degrees     QTc Int : 404 ms    Age and gender specific analysis  Normal sinus rhythm  Normal ECG  When compared with ECG of 20-JUL-2020 15:40,  QT has shortened  Confirmed by IMELDA MATA MD ([id number]) on 8/3/2020 9:47:43 AM    Referred By: [email or web address]   SELF           Confirmed By:IMELDA MATA MD       2D ECHO:  TTE:  Results for orders placed or performed during the hospital encounter of 05/05/20   Echo Color Flow Doppler? Yes   Result Value Ref Range    Ascending aorta 2.98 cm    STJ 2.21 cm    AV mean gradient 12 mmHg    Ao peak carlee 2.42 m/s    Ao VTI 47.90 cm    IVRT 114.18 msec    IVS 0.79 0.6 - 1.1 cm    LA size 4.63 cm    Left Atrium Major Axis 6.41 cm    Left Atrium Minor Axis 6.40 cm    LVIDD 4.77 3.5 - 6.0 cm    LVIDS 3.22 2.1 - 4.0 cm    LVOT diameter 2.01 cm    LVOT peak VTI 27.58 cm    PW 0.79 0.6 - 1.1 cm    MV Peak A Carlee 0.87 m/s    E wave decelartion time 327.47 msec    MV Peak E Carlee 0.66 m/s    PV Peak D Carlee 0.40 m/s    PV Peak S Carlee 0.68 m/s    RA Major Axis 4.63 cm    RA Width 2.85 cm    RVDD 3.88 cm    Sinus 2.58 cm    TAPSE 2.23 cm    TR Max Carlee 2.16 m/s    TDI LATERAL 0.09 m/s    TDI SEPTAL 0.06 m/s    LA WIDTH 4.59 cm    LV Diastolic Volume 105.82 mL    LV Systolic Volume 41.71 mL    RV S' 11.67 cm/s    LVOT peak carlee 1.29 m/s    LV LATERAL E/E' RATIO 7.33 m/s    LV SEPTAL E/E' RATIO 11.00 m/s    FS 32 %    LA volume 115.70 cm3    LV mass 123.33 g    Left Ventricle Relative Wall Thickness 0.33 cm    AV valve area 1.83 cm2    AV Velocity Ratio 0.53     AV index (prosthetic) 0.58     E/A ratio 0.76     Mean e' 0.08 m/s    Pulm vein S/D ratio 1.70     LVOT area 3.2 cm2    LVOT stroke volume 87.47 cm3    AV peak gradient 23 mmHg    E/E' ratio 8.80 m/s    Triscuspid Valve  Regurgitation Peak Gradient 19 mmHg    BSA 2.47 m2    LV Systolic Volume Index 17.8 mL/m2    LV Diastolic Volume Index 45.18 mL/m2    LA Volume Index 49.4 mL/m2    LV Mass Index 53 g/m2    Right Atrial Pressure (from IVC) 3 mmHg    TV rest pulmonary artery pressure 22 mmHg    Narrative    · Normal left ventricular systolic function. The estimated ejection   fraction is 60%.  · Severe left atrial enlargement.  · Grade I (mild) left ventricular diastolic dysfunction consistent with   impaired relaxation.  · Normal right ventricular systolic function.  · Mild right atrial enlargement.  · Mild aortic valve stenosis. Aortic valve area is 1.83 cm2; peak velocity   is 2.42 m/s; mean gradient is 12 mmHg.  · Mild tricuspid regurgitation.  · Normal central venous pressure (3 mmHg).  · The estimated PA systolic pressure is 22 mmHg.          REENA: Scheduled for future   No results found for this or any previous visit.    ASSESSMENT/PLAN:         Anesthesia Evaluation    I have reviewed the Patient Summary Reports.    I have reviewed the Nursing Notes. I have reviewed the NPO Status.   I have reviewed the Medications.     Review of Systems  Anesthesia Hx:  No problems with previous Anesthesia  History of prior surgery of interest to airway management or planning: Denies Family Hx of Anesthesia complications.   Denies Personal Hx of Anesthesia complications.   Social:  Non-Smoker  Tobacco Use: , quit smoking within the year   Cardiovascular:   Hypertension, well controlled Denies MI.  Denies CAD.    Denies CABG/stent. Dysrhythmias atrial fibrillation  Denies Angina. no hyperlipidemia  Denies Coronary Artery Disease.  Hypertension, Essential Hypertension , Pt in Pre-HTN range, systolic 130 - 139 or diastolic 85 - 89, Well Controlled on Rx  Disorder of Cardiac Rhythm, Atrial Fibrillation, Hx of Atrial Fibrillation, S/P ablation Rx    Pulmonary:   Denies COPD.  Denies Asthma.  Denies Shortness of breath.  Denies Recent URI. Sleep  Apnea, CPAP  Denies Asthma.  Denies Chronic Obstructive Pulmonary Disease (COPD).  Obstructive Sleep Apnea (ERENDIRA), CPAP prescribed, CPAP used.   Renal/:   Denies Chronic Renal Disease.   Denies Kidney Function/Disease    Hepatic/GI:   GERD, well controlled Denies Liver Disease.  Esophageal / Stomach Disorders Gerd Controlled by chronic antireflux medication.  Denies Liver Disease    Musculoskeletal:  Musculoskeletal Normal    Neurological:   Denies TIA. Denies CVA. Denies Seizures.  Denies Seizure Disorder  Denies CVA - Cerebrovasular Accident  Denies TIA - Transient Ischemic Attack    Endocrine:   Denies Diabetes. Denies Hypothyroidism.  Denies Diabetes  Denies Thyroid Disease  Metabolic Disorders, Morbid Obesity / BMI > 40  Psych:   Psychiatric History depression          Physical Exam  General:  Morbid Obesity    Airway/Jaw/Neck:  Airway Findings: Mouth Opening: Normal Tongue: Normal  General Airway Assessment: Adult  Mallampati: II  Improves to II with phonation.  TM Distance: Normal, at least 6 cm  Jaw/Neck Findings:  Neck ROM: Normal ROM      Dental:  Dental Findings: Upper Dentures, Lower Dentures, Edentulous   Chest/Lungs:  Chest/Lungs Findings: Clear to auscultation     Heart/Vascular:  Heart Findings: Rate: Normal  Rhythm: Regular Rhythm  Sounds: Normal        Mental Status:  Mental Status Findings:  Cooperative, Alert and Oriented         Anesthesia Plan  Type of Anesthesia, risks & benefits discussed:  Anesthesia Type:  general  Patient's Preference:   Intra-op Monitoring Plan: standard ASA monitors  Intra-op Monitoring Plan Comments:   Post Op Pain Control Plan: multimodal analgesia, IV/PO Opioids PRN and per primary service following discharge from PACU  Post Op Pain Control Plan Comments:   Induction:   IV  Beta Blocker:  Patient is on a Beta-Blocker and has received one dose within the past 24 hours (No further documentation required).       Informed Consent: Patient understands risks and agrees  with Anesthesia plan.  Questions answered. Anesthesia consent signed with patient.  ASA Score: 3     Day of Surgery Review of History & Physical:    H&P update referred to the surgeon.         Ready For Surgery From Anesthesia Perspective.

## 2020-08-05 NOTE — PLAN OF CARE
Plan of care reviewed with pt: AAOx4, on RA, VS stable. Left groin incision with WV at -125 put out 100cc during shift. Complained of pain on her left groin incision all the time, pain under control with Dilaudid and Oxycodone. Complained of nausea once, relieved with Zofran. Tolerated her diet well. Had one BM. Voided with adequate amount urine. Ambulated in room and hallway independently. COVID test collected. Will be NPO at midnight for surgery tmr. Call light in reach. WCTM.

## 2020-08-05 NOTE — SUBJECTIVE & OBJECTIVE
Medications:  Continuous Infusions:  Scheduled Meds:   acetaminophen  650 mg Oral Q6H    ciprofloxacin  400 mg Intravenous Q8H    DULoxetine  120 mg Oral Daily    enoxaparin  1 mg/kg Subcutaneous BID    hydroCHLOROthiazide  50 mg Oral Daily    lisinopriL  40 mg Oral Daily    metoprolol succinate  50 mg Oral BID    metronidazole  500 mg Intravenous Q8H    pantoprazole  40 mg Oral Daily    polyethylene glycol  17 g Oral Daily     PRN Meds:HYDROmorphone, ondansetron, oxyCODONE, oxyCODONE, sodium chloride 0.9%, traZODone     Objective:     Vital Signs (Most Recent):  Temp: 97.7 °F (36.5 °C) (08/05/20 0340)  Pulse: 63 (08/05/20 0728)  Resp: 18 (08/05/20 0728)  BP: 125/61 (08/05/20 0728)  SpO2: 97 % (08/05/20 0728) Vital Signs (24h Range):  Temp:  [97.6 °F (36.4 °C)-98.4 °F (36.9 °C)] 97.7 °F (36.5 °C)  Pulse:  [55-68] 63  Resp:  [16-20] 18  SpO2:  [96 %-98 %] 97 %  BP: (113-155)/(56-79) 125/61         Physical Exam  Vitals signs and nursing note reviewed.   Cardiovascular:      Rate and Rhythm: Normal rate.   Pulmonary:      Effort: Pulmonary effort is normal.   Musculoskeletal:      Comments: Left groin vac.          Significant Labs:  None    Significant Diagnostics:  none

## 2020-08-05 NOTE — PROGRESS NOTES
[facility]  Vascular Surgery  Progress Note    Patient Name: Vicky Alvarado  MRN: [id number]  Admission Date: 8/2/2020  Primary Care Provider: Gordy Cordero MD    Subjective:     Interval History: pain much better controlled. Doing very well.     Post-Op Info:  Procedure(s) (LRB):  WASHOUT (Left)  APPLICATION, WOUND VAC (Left)   2 Days Post-Op       Medications:  Continuous Infusions:  Scheduled Meds:   acetaminophen  650 mg Oral Q6H    ciprofloxacin  400 mg Intravenous Q8H    DULoxetine  120 mg Oral Daily    enoxaparin  1 mg/kg Subcutaneous BID    hydroCHLOROthiazide  50 mg Oral Daily    lisinopriL  40 mg Oral Daily    metoprolol succinate  50 mg Oral BID    metronidazole  500 mg Intravenous Q8H    pantoprazole  40 mg Oral Daily    polyethylene glycol  17 g Oral Daily     PRN Meds:HYDROmorphone, ondansetron, oxyCODONE, oxyCODONE, sodium chloride 0.9%, traZODone     Objective:     Vital Signs (Most Recent):  Temp: 97.7 °F (36.5 °C) (08/05/20 0340)  Pulse: 63 (08/05/20 0728)  Resp: 18 (08/05/20 0728)  BP: 125/61 (08/05/20 0728)  SpO2: 97 % (08/05/20 0728) Vital Signs (24h Range):  Temp:  [97.6 °F (36.4 °C)-98.4 °F (36.9 °C)] 97.7 °F (36.5 °C)  Pulse:  [55-68] 63  Resp:  [16-20] 18  SpO2:  [96 %-98 %] 97 %  BP: (113-155)/(56-79) 125/61         Physical Exam  Vitals signs and nursing note reviewed.   Cardiovascular:      Rate and Rhythm: Normal rate.   Pulmonary:      Effort: Pulmonary effort is normal.   Musculoskeletal:      Comments: Left groin vac.          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 Was then discharged with drains but returned to ED 8/3 with wound breakdown. Taken to OR 8/3 for washout and vac placement. Doing well.     -cipro/flagyl for cultures positive for proteus and e  coli  -OR tomorrow for washout with plastic surgery  -regular diet, npo midnight  -home meds  -PRN pain meds  -DVT ppx           Elvin Wood MD  Vascular Surgery  [facility]

## 2020-08-05 NOTE — ASSESSMENT & PLAN NOTE
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 Was then discharged with drains but returned to ED 8/3 with wound breakdown. Taken to OR 8/3 for washout and vac placement. Doing well.     -cipro/flagyl for cultures positive for proteus and e coli  -OR tomorrow for washout with plastic surgery  -regular diet, npo midnight  -home meds  -PRN pain meds  -DVT ppx

## 2020-08-05 NOTE — PLAN OF CARE
08/05/20 1100   Post-Acute Status   Post-Acute Authorization HME   HME Status Pending Delivery Hospital    received call from Armida at [facility]. Pt's home wound vac to be released to be delivered to the bedside today.  .Noemí Hernández LMSW  [facility]  [id number]

## 2020-08-06 ENCOUNTER — ANESTHESIA (OUTPATIENT)
Dept: SURGERY | Facility: HOSPITAL | Age: 55
DRG: 902 | End: 2020-08-06
Payer: COMMERCIAL

## 2020-08-06 LAB
ANION GAP SERPL CALC-SCNC: 10 MMOL/L (ref 8–16)
BASOPHILS # BLD AUTO: 0.11 K/UL (ref 0–0.2)
BASOPHILS NFR BLD: 1.3 % (ref 0–1.9)
BUN SERPL-MCNC: 9 MG/DL (ref 6–20)
CALCIUM SERPL-MCNC: 9.2 MG/DL (ref 8.7–10.5)
CHLORIDE SERPL-SCNC: 102 MMOL/L (ref 95–110)
CO2 SERPL-SCNC: 24 MMOL/L (ref 23–29)
CREAT SERPL-MCNC: 0.8 MG/DL (ref 0.5–1.4)
DIFFERENTIAL METHOD: ABNORMAL
EOSINOPHIL # BLD AUTO: 0.7 K/UL (ref 0–0.5)
EOSINOPHIL NFR BLD: 8.1 % (ref 0–8)
ERYTHROCYTE [DISTWIDTH] IN BLOOD BY AUTOMATED COUNT: 15.4 % (ref 11.5–14.5)
EST. GFR  (AFRICAN AMERICAN): >60 ML/MIN/1.73 M^2
EST. GFR  (NON AFRICAN AMERICAN): >60 ML/MIN/1.73 M^2
GLUCOSE SERPL-MCNC: 83 MG/DL (ref 70–110)
HCT VFR BLD AUTO: 30.5 % (ref 37–48.5)
HGB BLD-MCNC: 8.9 G/DL (ref 12–16)
IMM GRANULOCYTES # BLD AUTO: 0.13 K/UL (ref 0–0.04)
IMM GRANULOCYTES NFR BLD AUTO: 1.5 % (ref 0–0.5)
LYMPHOCYTES # BLD AUTO: 1.9 K/UL (ref 1–4.8)
LYMPHOCYTES NFR BLD: 21.1 % (ref 18–48)
MCH RBC QN AUTO: 26.6 PG (ref 27–31)
MCHC RBC AUTO-ENTMCNC: 29.2 G/DL (ref 32–36)
MCV RBC AUTO: 91 FL (ref 82–98)
MONOCYTES # BLD AUTO: 1.3 K/UL (ref 0.3–1)
MONOCYTES NFR BLD: 15.3 % (ref 4–15)
NEUTROPHILS # BLD AUTO: 4.6 K/UL (ref 1.8–7.7)
NEUTROPHILS NFR BLD: 52.7 % (ref 38–73)
NRBC BLD-RTO: 1 /100 WBC
PLATELET # BLD AUTO: 292 K/UL (ref 150–350)
PMV BLD AUTO: 10.4 FL (ref 9.2–12.9)
POTASSIUM SERPL-SCNC: 3.9 MMOL/L (ref 3.5–5.1)
RBC # BLD AUTO: 3.34 M/UL (ref 4–5.4)
SODIUM SERPL-SCNC: 136 MMOL/L (ref 136–145)
WBC # BLD AUTO: 8.75 K/UL (ref 3.9–12.7)

## 2020-08-06 PROCEDURE — 63600175 PHARM REV CODE 636 W HCPCS

## 2020-08-06 PROCEDURE — 85025 COMPLETE CBC W/AUTO DIFF WBC: CPT

## 2020-08-06 PROCEDURE — 25000003 PHARM REV CODE 250: Performed by: STUDENT IN AN ORGANIZED HEALTH CARE EDUCATION/TRAINING PROGRAM

## 2020-08-06 PROCEDURE — 37000008 HC ANESTHESIA 1ST 15 MINUTES: Performed by: SURGERY

## 2020-08-06 PROCEDURE — 63600175 PHARM REV CODE 636 W HCPCS: Performed by: STUDENT IN AN ORGANIZED HEALTH CARE EDUCATION/TRAINING PROGRAM

## 2020-08-06 PROCEDURE — 25000003 PHARM REV CODE 250: Performed by: NURSE ANESTHETIST, CERTIFIED REGISTERED

## 2020-08-06 PROCEDURE — 63600175 PHARM REV CODE 636 W HCPCS: Performed by: NURSE ANESTHETIST, CERTIFIED REGISTERED

## 2020-08-06 PROCEDURE — 20600001 HC STEP DOWN PRIVATE ROOM

## 2020-08-06 PROCEDURE — 71000039 HC RECOVERY, EACH ADD'L HOUR: Performed by: SURGERY

## 2020-08-06 PROCEDURE — 71000015 HC POSTOP RECOV 1ST HR: Performed by: SURGERY

## 2020-08-06 PROCEDURE — 94761 N-INVAS EAR/PLS OXIMETRY MLT: CPT

## 2020-08-06 PROCEDURE — D9220A PRA ANESTHESIA: ICD-10-PCS | Mod: ,,, | Performed by: ANESTHESIOLOGY

## 2020-08-06 PROCEDURE — 71000016 HC POSTOP RECOV ADDL HR: Performed by: SURGERY

## 2020-08-06 PROCEDURE — 63600175 PHARM REV CODE 636 W HCPCS: Performed by: ANESTHESIOLOGY

## 2020-08-06 PROCEDURE — 36000706: Performed by: SURGERY

## 2020-08-06 PROCEDURE — 37000009 HC ANESTHESIA EA ADD 15 MINS: Performed by: SURGERY

## 2020-08-06 PROCEDURE — S0030 INJECTION, METRONIDAZOLE: HCPCS | Performed by: STUDENT IN AN ORGANIZED HEALTH CARE EDUCATION/TRAINING PROGRAM

## 2020-08-06 PROCEDURE — 11042 PR DEBRIDEMENT, SKIN, SUB-Q TISSUE,=<20 SQ CM: ICD-10-PCS | Mod: ,,, | Performed by: SURGERY

## 2020-08-06 PROCEDURE — 97606 PR NEG PRESS WOUND THERAPY (NPWT) W/NON-DISPOSABLE WOUND VAC DEVICE (DME), >=50 CM: ICD-10-PCS | Mod: ,,, | Performed by: SURGERY

## 2020-08-06 PROCEDURE — 11042 DBRDMT SUBQ TIS 1ST 20SQCM/<: CPT | Mod: ,,, | Performed by: SURGERY

## 2020-08-06 PROCEDURE — 11042 DBRDMT SUBQ TIS 1ST 20SQCM/<: CPT | Mod: 78,,, | Performed by: SURGERY

## 2020-08-06 PROCEDURE — 11042 PR DEBRIDEMENT, SKIN, SUB-Q TISSUE,=<20 SQ CM: ICD-10-PCS | Mod: 78,,, | Performed by: SURGERY

## 2020-08-06 PROCEDURE — 97606 NEG PRS WND THER DME>50 SQCM: CPT | Mod: ,,, | Performed by: SURGERY

## 2020-08-06 PROCEDURE — 71000033 HC RECOVERY, INTIAL HOUR: Performed by: SURGERY

## 2020-08-06 PROCEDURE — 25000003 PHARM REV CODE 250: Performed by: ANESTHESIOLOGY

## 2020-08-06 PROCEDURE — D9220A PRA ANESTHESIA: Mod: ,,, | Performed by: ANESTHESIOLOGY

## 2020-08-06 PROCEDURE — 27201423 OPTIME MED/SURG SUP & DEVICES STERILE SUPPLY: Performed by: SURGERY

## 2020-08-06 PROCEDURE — 36415 COLL VENOUS BLD VENIPUNCTURE: CPT

## 2020-08-06 PROCEDURE — 36000707: Performed by: SURGERY

## 2020-08-06 PROCEDURE — 80048 BASIC METABOLIC PNL TOTAL CA: CPT

## 2020-08-06 RX ORDER — SODIUM CHLORIDE 9 MG/ML
INJECTION, SOLUTION INTRAVENOUS CONTINUOUS PRN
Status: DISCONTINUED | OUTPATIENT
Start: 2020-08-06 | End: 2020-08-06

## 2020-08-06 RX ORDER — HYDROMORPHONE HYDROCHLORIDE 1 MG/ML
0.5 INJECTION, SOLUTION INTRAMUSCULAR; INTRAVENOUS; SUBCUTANEOUS EVERY 4 HOURS PRN
Status: DISCONTINUED | OUTPATIENT
Start: 2020-08-06 | End: 2020-08-06

## 2020-08-06 RX ORDER — FENTANYL CITRATE 50 UG/ML
INJECTION, SOLUTION INTRAMUSCULAR; INTRAVENOUS
Status: COMPLETED
Start: 2020-08-06 | End: 2020-08-06

## 2020-08-06 RX ORDER — FENTANYL CITRATE 50 UG/ML
INJECTION, SOLUTION INTRAMUSCULAR; INTRAVENOUS
Status: DISCONTINUED | OUTPATIENT
Start: 2020-08-06 | End: 2020-08-06

## 2020-08-06 RX ORDER — FENTANYL CITRATE 50 UG/ML
25 INJECTION, SOLUTION INTRAMUSCULAR; INTRAVENOUS EVERY 5 MIN PRN
Status: COMPLETED | OUTPATIENT
Start: 2020-08-06 | End: 2020-08-06

## 2020-08-06 RX ORDER — LIDOCAINE HYDROCHLORIDE 20 MG/ML
INJECTION, SOLUTION EPIDURAL; INFILTRATION; INTRACAUDAL; PERINEURAL
Status: DISCONTINUED | OUTPATIENT
Start: 2020-08-06 | End: 2020-08-06

## 2020-08-06 RX ORDER — HYDROMORPHONE HYDROCHLORIDE 1 MG/ML
0.5 INJECTION, SOLUTION INTRAMUSCULAR; INTRAVENOUS; SUBCUTANEOUS EVERY 4 HOURS PRN
Status: DISCONTINUED | OUTPATIENT
Start: 2020-08-06 | End: 2020-08-07 | Stop reason: HOSPADM

## 2020-08-06 RX ORDER — ONDANSETRON 8 MG/1
8 TABLET, ORALLY DISINTEGRATING ORAL EVERY 8 HOURS PRN
Status: DISCONTINUED | OUTPATIENT
Start: 2020-08-06 | End: 2020-08-07 | Stop reason: HOSPADM

## 2020-08-06 RX ORDER — ONDANSETRON 2 MG/ML
4 INJECTION INTRAMUSCULAR; INTRAVENOUS ONCE AS NEEDED
Status: DISCONTINUED | OUTPATIENT
Start: 2020-08-06 | End: 2020-08-06 | Stop reason: HOSPADM

## 2020-08-06 RX ORDER — HYDROCODONE BITARTRATE AND ACETAMINOPHEN 5; 325 MG/1; MG/1
1 TABLET ORAL EVERY 4 HOURS PRN
Status: DISCONTINUED | OUTPATIENT
Start: 2020-08-06 | End: 2020-08-06

## 2020-08-06 RX ORDER — HYDROCODONE BITARTRATE AND ACETAMINOPHEN 5; 325 MG/1; MG/1
1 TABLET ORAL EVERY 4 HOURS PRN
Status: DISCONTINUED | OUTPATIENT
Start: 2020-08-06 | End: 2020-08-07 | Stop reason: HOSPADM

## 2020-08-06 RX ORDER — ROCURONIUM BROMIDE 10 MG/ML
INJECTION, SOLUTION INTRAVENOUS
Status: DISCONTINUED | OUTPATIENT
Start: 2020-08-06 | End: 2020-08-06

## 2020-08-06 RX ORDER — SODIUM CHLORIDE 0.9 % (FLUSH) 0.9 %
2 SYRINGE (ML) INJECTION
Status: DISCONTINUED | OUTPATIENT
Start: 2020-08-06 | End: 2020-08-06 | Stop reason: HOSPADM

## 2020-08-06 RX ORDER — ONDANSETRON 2 MG/ML
INJECTION INTRAMUSCULAR; INTRAVENOUS
Status: DISCONTINUED | OUTPATIENT
Start: 2020-08-06 | End: 2020-08-06

## 2020-08-06 RX ORDER — PROPOFOL 10 MG/ML
VIAL (ML) INTRAVENOUS
Status: DISCONTINUED | OUTPATIENT
Start: 2020-08-06 | End: 2020-08-06

## 2020-08-06 RX ORDER — MIDAZOLAM HYDROCHLORIDE 1 MG/ML
INJECTION, SOLUTION INTRAMUSCULAR; INTRAVENOUS
Status: DISCONTINUED | OUTPATIENT
Start: 2020-08-06 | End: 2020-08-06

## 2020-08-06 RX ORDER — DEXAMETHASONE SODIUM PHOSPHATE 4 MG/ML
INJECTION, SOLUTION INTRA-ARTICULAR; INTRALESIONAL; INTRAMUSCULAR; INTRAVENOUS; SOFT TISSUE
Status: DISCONTINUED | OUTPATIENT
Start: 2020-08-06 | End: 2020-08-06

## 2020-08-06 RX ORDER — SUCCINYLCHOLINE CHLORIDE 20 MG/ML
INJECTION INTRAMUSCULAR; INTRAVENOUS
Status: DISCONTINUED | OUTPATIENT
Start: 2020-08-06 | End: 2020-08-06

## 2020-08-06 RX ORDER — HYDROMORPHONE HYDROCHLORIDE 1 MG/ML
0.4 INJECTION, SOLUTION INTRAMUSCULAR; INTRAVENOUS; SUBCUTANEOUS EVERY 5 MIN PRN
Status: DISCONTINUED | OUTPATIENT
Start: 2020-08-06 | End: 2020-08-06 | Stop reason: HOSPADM

## 2020-08-06 RX ORDER — MUPIROCIN 20 MG/G
1 OINTMENT TOPICAL 2 TIMES DAILY
Status: DISCONTINUED | OUTPATIENT
Start: 2020-08-06 | End: 2020-08-07 | Stop reason: HOSPADM

## 2020-08-06 RX ORDER — PHENYLEPHRINE HYDROCHLORIDE 10 MG/ML
INJECTION INTRAVENOUS
Status: DISCONTINUED | OUTPATIENT
Start: 2020-08-06 | End: 2020-08-06

## 2020-08-06 RX ADMIN — FENTANYL CITRATE 100 MCG: 50 INJECTION, SOLUTION INTRAMUSCULAR; INTRAVENOUS at 12:08

## 2020-08-06 RX ADMIN — HYDROMORPHONE HYDROCHLORIDE 0.4 MG: 1 INJECTION, SOLUTION INTRAMUSCULAR; INTRAVENOUS; SUBCUTANEOUS at 03:08

## 2020-08-06 RX ADMIN — FENTANYL CITRATE 25 MCG: 50 INJECTION INTRAMUSCULAR; INTRAVENOUS at 02:08

## 2020-08-06 RX ADMIN — METRONIDAZOLE 500 MG: 500 INJECTION, SOLUTION INTRAVENOUS at 05:08

## 2020-08-06 RX ADMIN — ONDANSETRON 4 MG: 2 INJECTION, SOLUTION INTRAMUSCULAR; INTRAVENOUS at 01:08

## 2020-08-06 RX ADMIN — PANTOPRAZOLE SODIUM 40 MG: 40 TABLET, DELAYED RELEASE ORAL at 11:08

## 2020-08-06 RX ADMIN — MIDAZOLAM HYDROCHLORIDE 2 MG: 1 INJECTION, SOLUTION INTRAMUSCULAR; INTRAVENOUS at 11:08

## 2020-08-06 RX ADMIN — ROCURONIUM BROMIDE 5 MG: 10 INJECTION, SOLUTION INTRAVENOUS at 12:08

## 2020-08-06 RX ADMIN — HYDROMORPHONE HYDROCHLORIDE 0.5 MG: 1 INJECTION, SOLUTION INTRAMUSCULAR; INTRAVENOUS; SUBCUTANEOUS at 08:08

## 2020-08-06 RX ADMIN — PHENYLEPHRINE HYDROCHLORIDE 200 MCG: 10 INJECTION INTRAVENOUS at 12:08

## 2020-08-06 RX ADMIN — PROPOFOL 200 MG: 10 INJECTION, EMULSION INTRAVENOUS at 12:08

## 2020-08-06 RX ADMIN — OXYCODONE HYDROCHLORIDE 10 MG: 10 TABLET ORAL at 01:08

## 2020-08-06 RX ADMIN — ENOXAPARIN SODIUM 135 MG: 150 INJECTION SUBCUTANEOUS at 08:08

## 2020-08-06 RX ADMIN — LISINOPRIL 40 MG: 20 TABLET ORAL at 08:08

## 2020-08-06 RX ADMIN — HYDROCHLOROTHIAZIDE 50 MG: 25 TABLET ORAL at 08:08

## 2020-08-06 RX ADMIN — METOPROLOL SUCCINATE 50 MG: 50 TABLET, EXTENDED RELEASE ORAL at 08:08

## 2020-08-06 RX ADMIN — HYDROMORPHONE HYDROCHLORIDE 0.5 MG: 1 INJECTION, SOLUTION INTRAMUSCULAR; INTRAVENOUS; SUBCUTANEOUS at 10:08

## 2020-08-06 RX ADMIN — CIPROFLOXACIN 400 MG: 2 INJECTION, SOLUTION INTRAVENOUS at 01:08

## 2020-08-06 RX ADMIN — FENTANYL CITRATE 50 MCG: 50 INJECTION, SOLUTION INTRAMUSCULAR; INTRAVENOUS at 01:08

## 2020-08-06 RX ADMIN — METRONIDAZOLE 500 MG: 500 INJECTION, SOLUTION INTRAVENOUS at 02:08

## 2020-08-06 RX ADMIN — CIPROFLOXACIN 400 MG: 2 INJECTION, SOLUTION INTRAVENOUS at 11:08

## 2020-08-06 RX ADMIN — SUCCINYLCHOLINE CHLORIDE 200 MG: 20 INJECTION, SOLUTION INTRAMUSCULAR; INTRAVENOUS at 12:08

## 2020-08-06 RX ADMIN — HYDROMORPHONE HYDROCHLORIDE 0.5 MG: 1 INJECTION, SOLUTION INTRAMUSCULAR; INTRAVENOUS; SUBCUTANEOUS at 05:08

## 2020-08-06 RX ADMIN — DEXAMETHASONE SODIUM PHOSPHATE 4 MG: 4 INJECTION, SOLUTION INTRAMUSCULAR; INTRAVENOUS at 12:08

## 2020-08-06 RX ADMIN — TRAZODONE HYDROCHLORIDE 100 MG: 100 TABLET ORAL at 10:08

## 2020-08-06 RX ADMIN — LIDOCAINE HYDROCHLORIDE 100 MG: 20 INJECTION, SOLUTION EPIDURAL; INFILTRATION; INTRACAUDAL; PERINEURAL at 12:08

## 2020-08-06 RX ADMIN — SODIUM CHLORIDE: 0.9 INJECTION, SOLUTION INTRAVENOUS at 11:08

## 2020-08-06 RX ADMIN — ACETAMINOPHEN 650 MG: 325 TABLET ORAL at 05:08

## 2020-08-06 RX ADMIN — PROPOFOL 30 MG: 10 INJECTION, EMULSION INTRAVENOUS at 12:08

## 2020-08-06 RX ADMIN — METRONIDAZOLE 500 MG: 500 INJECTION, SOLUTION INTRAVENOUS at 11:08

## 2020-08-06 RX ADMIN — MUPIROCIN 1 G: 20 OINTMENT TOPICAL at 08:08

## 2020-08-06 RX ADMIN — CIPROFLOXACIN 400 MG: 2 INJECTION, SOLUTION INTRAVENOUS at 05:08

## 2020-08-06 RX ADMIN — FENTANYL CITRATE 25 MCG: 50 INJECTION INTRAMUSCULAR; INTRAVENOUS at 01:08

## 2020-08-06 RX ADMIN — ACETAMINOPHEN 650 MG: 325 TABLET ORAL at 12:08

## 2020-08-06 NOTE — PROGRESS NOTES
[facility]  Vascular Surgery  Progress Note    Patient Name: Vicky Alvarado  MRN: [id number]  Admission Date: 8/2/2020  Primary Care Provider: Gordy Cordero MD    Subjective:     Interval History: NAEO.     Post-Op Info:  Procedure(s) (LRB):  WASHOUT (Left)  APPLICATION, WOUND VAC (Left)   3 Days Post-Op       Medications:  Continuous Infusions:  Scheduled Meds:   acetaminophen  650 mg Oral Q6H    ciprofloxacin  400 mg Intravenous Q8H    DULoxetine  120 mg Oral Daily    enoxaparin  1 mg/kg Subcutaneous BID    hydroCHLOROthiazide  50 mg Oral Daily    lisinopriL  40 mg Oral Daily    metoprolol succinate  50 mg Oral BID    metronidazole  500 mg Intravenous Q8H    pantoprazole  40 mg Oral Daily    polyethylene glycol  17 g Oral Daily     PRN Meds:HYDROmorphone, ondansetron, oxyCODONE, oxyCODONE, sodium chloride 0.9%, traZODone     Objective:     Vital Signs (Most Recent):  Temp: 97.7 °F (36.5 °C) (08/06/20 0335)  Pulse: 60 (08/06/20 0335)  Resp: 18 (08/06/20 0335)  BP: (!) 103/58 (08/06/20 0335)  SpO2: 95 % (08/06/20 0335) Vital Signs (24h Range):  Temp:  [97.4 °F (36.3 °C)-98.2 °F (36.8 °C)] 97.7 °F (36.5 °C)  Pulse:  [55-66] 60  Resp:  [18-19] 18  SpO2:  [95 %-97 %] 95 %  BP: (103-139)/(58-65) 103/58         Physical Exam  Vitals signs and nursing note reviewed.   Constitutional:       Appearance: Normal appearance.   Musculoskeletal:      Comments: Left groin with wound vac in place, ss output      Skin:     General: Skin is warm and dry.      Capillary Refill: Capillary refill takes less than 2 seconds.   Neurological:      Mental Status: She is alert.   Psychiatric:         Mood and Affect: Mood normal.         Behavior: Behavior normal.         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 Was then  discharged with drains but returned to ED 8/3 with wound breakdown. Taken to OR 8/3 for washout and vac placement. Doing well.     - OR today for left groin washout, wound vac change, plastics eval   - cipro/flagyl for cultures positive for proteus and e coli  - regular diet  - home meds  - PRN pain meds  - DVT ppx           Eladia Curiel MD  Vascular Surgery  [facility]

## 2020-08-06 NOTE — BRIEF OP NOTE
[facility]  Brief Operative Note    SUMMARY     Surgery Date: 8/6/2020     Surgeon(s) and Role:     * SEMAJ Márquez III, MD - Primary     * Moose Grace MD - Resident - Assisting     * Gerson Child MD - Fellow     * Loco Hamilton MD        Pre-op Diagnosis:  Hematoma of groin, subsequent encounter [S30.1XXD]    Post-op Diagnosis:  Post-Op Diagnosis Codes:     * Hematoma of groin, subsequent encounter [S30.1XXD]    Procedure(s) (LRB):  WASHOUT - left groin (Left)  DEBRIDEMENT, WOUND (Left)  CLOSURE, WOUND (Left)  APPLICATION, WOUND VAC (Left)    Anesthesia: Choice    Description of Procedure: see op note    Description of the findings of the procedure: see op note    Estimated Blood Loss: * No values recorded between 8/6/2020 12:38 PM and 8/6/2020  1:34 PM *         Specimens:   Specimen (12h ago, onward)    None

## 2020-08-06 NOTE — PLAN OF CARE
Plan of care reviewed with pt/verbalized understanding, vss, patient ambulatory to bathroom independently, patient c/o of incisional pain relieved with po and iv medications, patient remains NPO @ MN, patient able to make needs known, call-light within reach, will continue to monitor.

## 2020-08-06 NOTE — NURSING TRANSFER
Nursing Transfer Note      8/6/2020     Transfer From: PACU    Transfer via stretcher    Transfer with cardiac monitoring    Transported by Transport    Medicines sent: n/a     Chart send with patient: Yes    Notified: sister at bedside    Patient reassessed at: 08/06 4:52 pm     Upon arrival to floor: cardiac monitor applied, patient oriented to room, call bell in reach and bed in lowest position

## 2020-08-06 NOTE — ASSESSMENT & PLAN NOTE
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 Was then discharged with drains but returned to ED 8/3 with wound breakdown. Taken to OR 8/3 for washout and vac placement. Doing well.     - OR today for left groin washout, wound vac change, plastics eval   - cipro/flagyl for cultures positive for proteus and e coli  - regular diet  - home meds  - PRN pain meds  - DVT ppx      Clinical Pharmacy Consult Note    79 Terence Scherer PharmD, [facility] 1/15/2020 9:52 PM

## 2020-08-06 NOTE — SUBJECTIVE & OBJECTIVE
Medications:  Continuous Infusions:  Scheduled Meds:   acetaminophen  650 mg Oral Q6H    ciprofloxacin  400 mg Intravenous Q8H    DULoxetine  120 mg Oral Daily    enoxaparin  1 mg/kg Subcutaneous BID    hydroCHLOROthiazide  50 mg Oral Daily    lisinopriL  40 mg Oral Daily    metoprolol succinate  50 mg Oral BID    metronidazole  500 mg Intravenous Q8H    pantoprazole  40 mg Oral Daily    polyethylene glycol  17 g Oral Daily     PRN Meds:HYDROmorphone, ondansetron, oxyCODONE, oxyCODONE, sodium chloride 0.9%, traZODone     Objective:     Vital Signs (Most Recent):  Temp: 97.7 °F (36.5 °C) (08/06/20 0335)  Pulse: 60 (08/06/20 0335)  Resp: 18 (08/06/20 0335)  BP: (!) 103/58 (08/06/20 0335)  SpO2: 95 % (08/06/20 0335) Vital Signs (24h Range):  Temp:  [97.4 °F (36.3 °C)-98.2 °F (36.8 °C)] 97.7 °F (36.5 °C)  Pulse:  [55-66] 60  Resp:  [18-19] 18  SpO2:  [95 %-97 %] 95 %  BP: (103-139)/(58-65) 103/58         Physical Exam  Vitals signs and nursing note reviewed.   Constitutional:       Appearance: Normal appearance.   Musculoskeletal:      Comments: Left groin with wound vac in place, ss output      Skin:     General: Skin is warm and dry.      Capillary Refill: Capillary refill takes less than 2 seconds.   Neurological:      Mental Status: She is alert.   Psychiatric:         Mood and Affect: Mood normal.         Behavior: Behavior normal.         Significant Labs:  None    Significant Diagnostics:  None

## 2020-08-06 NOTE — PLAN OF CARE
A&Ox4. VVS on RA. Adequate UOP per toilet. Washout to Left groin today, returned from PACU complaining of a pain level of 9/10, wound vac in place @125. Independent ambulation in room and to toilet. Pain managed with PRN medications, denies n/v. Bed in lowest position, call light in reach, sister at bedside. WCTM.  Problem: Fall Injury Risk  Goal: Absence of Fall and Fall-Related Injury  Outcome: Ongoing, Progressing     Problem: Adult Inpatient Plan of Care  Goal: Plan of Care Review  Outcome: Ongoing, Progressing  Goal: Patient-Specific Goal (Individualization)  Outcome: Ongoing, Progressing  Goal: Absence of Hospital-Acquired Illness or Injury  Outcome: Ongoing, Progressing  Goal: Optimal Comfort and Wellbeing  Outcome: Ongoing, Progressing  Goal: Readiness for Transition of Care  Outcome: Ongoing, Progressing  Goal: Rounds/Family Conference  Outcome: Ongoing, Progressing     Problem: Bariatric Environmental Safety  Goal: Safety Maintained with Care  Outcome: Ongoing, Progressing

## 2020-08-06 NOTE — TRANSFER OF CARE
"Anesthesia Transfer of Care Note    Patient: Vicky Alvarado    Procedure(s) Performed: Procedure(s) (LRB):  WASHOUT - left groin (Left)  DEBRIDEMENT, WOUND (Left)  CLOSURE, WOUND (Left)  APPLICATION, WOUND VAC (Left)    Patient location: PACU    Anesthesia Type: general    Transport from OR: Transported from OR on 6-10 L/min O2 by face mask with adequate spontaneous ventilation    Post pain: adequate analgesia    Post assessment: no apparent anesthetic complications and tolerated procedure well    Post vital signs: stable    Level of consciousness: awake and alert    Nausea/Vomiting: no nausea/vomiting    Complications: none    Transfer of care protocol was followed      Last vitals:   Visit Vitals  BP (!) 111/58   Pulse 62   Temp 36.5 °C (97.7 °F) (Temporal)   Resp 18   Ht 5' 6" (1.676 m)   Wt (!) 138.3 kg (304 lb 14.3 oz)   LMP 06/08/2015   SpO2 100%   Breastfeeding No   BMI 49.21 kg/m²     "

## 2020-08-06 NOTE — NURSING TRANSFER
Nursing Transfer Note      8/6/2020     Transfer To: 1007    Transfer via stretcher    Transfer with cardiac monitoring    Transported by pct    Medicines sent: none    Chart send with patient: Yes    Notified: sister    Patient reassessed at:7550 8/6/2020

## 2020-08-06 NOTE — NURSING TRANSFER
Nursing Transfer Note      8/6/2020     Transfer To: Day of Surgery    Transfer via stretcher    Transfer with cardiac monitoring    Transported by Transport    Medicines sent: none    Chart send with patient: Yes    Notified: Sister at bedside, with sister at DOS    Patient reassessed at: N/A    Upon arrival to floor: bed in lowest position

## 2020-08-06 NOTE — ANESTHESIA PROCEDURE NOTES
Intubation  Performed by: Nicole A. Lombardi, CRNA  Authorized by: Florin Zimmerman MD     Intubation:     Induction:  Intravenous    Intubated:  Postinduction    Mask Ventilation:  Easy mask    Attempts:  1    Attempted By:  Resident anesthesiologist    Method of Intubation:  Direct    Blade:  Ng 2    Laryngeal View Grade: Grade I - full view of chords      Difficult Airway Encountered?: No      Complications:  None    Airway Device:  Oral endotracheal tube    Airway Device Size:  7.5    Style/Cuff Inflation:  Cuffed    Inflation Amount (mL):  6    Secured at:  The lips    Placement Verified By:  Capnometry    Complicating Factors:  None    Findings Post-Intubation:  BS equal bilateral and atraumatic/condition of teeth unchanged

## 2020-08-07 ENCOUNTER — TELEPHONE (OUTPATIENT)
Dept: VASCULAR SURGERY | Facility: CLINIC | Age: 55
End: 2020-08-07

## 2020-08-07 ENCOUNTER — EXTERNAL HOME HEALTH (OUTPATIENT)
Dept: HOME HEALTH SERVICES | Facility: HOSPITAL | Age: 55
End: 2020-08-07
Payer: COMMERCIAL

## 2020-08-07 VITALS
TEMPERATURE: 98 F | HEIGHT: 66 IN | RESPIRATION RATE: 18 BRPM | HEART RATE: 61 BPM | DIASTOLIC BLOOD PRESSURE: 72 MMHG | BODY MASS INDEX: 47.09 KG/M2 | OXYGEN SATURATION: 100 % | SYSTOLIC BLOOD PRESSURE: 144 MMHG | WEIGHT: 293 LBS

## 2020-08-07 LAB
ANION GAP SERPL CALC-SCNC: 10 MMOL/L (ref 8–16)
BACTERIA BLD CULT: NORMAL
BACTERIA BLD CULT: NORMAL
BACTERIA SPEC ANAEROBE CULT: ABNORMAL
BASOPHILS # BLD AUTO: 0.07 K/UL (ref 0–0.2)
BASOPHILS NFR BLD: 0.6 % (ref 0–1.9)
BUN SERPL-MCNC: 9 MG/DL (ref 6–20)
CALCIUM SERPL-MCNC: 8.6 MG/DL (ref 8.7–10.5)
CHLORIDE SERPL-SCNC: 100 MMOL/L (ref 95–110)
CO2 SERPL-SCNC: 24 MMOL/L (ref 23–29)
CREAT SERPL-MCNC: 0.8 MG/DL (ref 0.5–1.4)
DIFFERENTIAL METHOD: ABNORMAL
EOSINOPHIL # BLD AUTO: 0.3 K/UL (ref 0–0.5)
EOSINOPHIL NFR BLD: 2.5 % (ref 0–8)
ERYTHROCYTE [DISTWIDTH] IN BLOOD BY AUTOMATED COUNT: 15.5 % (ref 11.5–14.5)
EST. GFR  (AFRICAN AMERICAN): >60 ML/MIN/1.73 M^2
EST. GFR  (NON AFRICAN AMERICAN): >60 ML/MIN/1.73 M^2
GLUCOSE SERPL-MCNC: 159 MG/DL (ref 70–110)
HCT VFR BLD AUTO: 28.7 % (ref 37–48.5)
HGB BLD-MCNC: 8.8 G/DL (ref 12–16)
IMM GRANULOCYTES # BLD AUTO: 0.15 K/UL (ref 0–0.04)
IMM GRANULOCYTES NFR BLD AUTO: 1.3 % (ref 0–0.5)
LYMPHOCYTES # BLD AUTO: 1.4 K/UL (ref 1–4.8)
LYMPHOCYTES NFR BLD: 12.2 % (ref 18–48)
MCH RBC QN AUTO: 27.2 PG (ref 27–31)
MCHC RBC AUTO-ENTMCNC: 30.7 G/DL (ref 32–36)
MCV RBC AUTO: 89 FL (ref 82–98)
MONOCYTES # BLD AUTO: 1.2 K/UL (ref 0.3–1)
MONOCYTES NFR BLD: 10.4 % (ref 4–15)
NEUTROPHILS # BLD AUTO: 8.5 K/UL (ref 1.8–7.7)
NEUTROPHILS NFR BLD: 73 % (ref 38–73)
NRBC BLD-RTO: 0 /100 WBC
PLATELET # BLD AUTO: 287 K/UL (ref 150–350)
PMV BLD AUTO: 10.8 FL (ref 9.2–12.9)
POTASSIUM SERPL-SCNC: 3.9 MMOL/L (ref 3.5–5.1)
RBC # BLD AUTO: 3.24 M/UL (ref 4–5.4)
SODIUM SERPL-SCNC: 134 MMOL/L (ref 136–145)
WBC # BLD AUTO: 11.66 K/UL (ref 3.9–12.7)

## 2020-08-07 PROCEDURE — 25000003 PHARM REV CODE 250: Performed by: STUDENT IN AN ORGANIZED HEALTH CARE EDUCATION/TRAINING PROGRAM

## 2020-08-07 PROCEDURE — 80048 BASIC METABOLIC PNL TOTAL CA: CPT

## 2020-08-07 PROCEDURE — 63600175 PHARM REV CODE 636 W HCPCS: Performed by: STUDENT IN AN ORGANIZED HEALTH CARE EDUCATION/TRAINING PROGRAM

## 2020-08-07 PROCEDURE — S0030 INJECTION, METRONIDAZOLE: HCPCS | Performed by: STUDENT IN AN ORGANIZED HEALTH CARE EDUCATION/TRAINING PROGRAM

## 2020-08-07 PROCEDURE — 85025 COMPLETE CBC W/AUTO DIFF WBC: CPT

## 2020-08-07 PROCEDURE — 36415 COLL VENOUS BLD VENIPUNCTURE: CPT

## 2020-08-07 RX ORDER — CIPROFLOXACIN 2 MG/ML
400 INJECTION, SOLUTION INTRAVENOUS EVERY 8 HOURS
Qty: 6000 ML | Refills: 0 | Status: SHIPPED | OUTPATIENT
Start: 2020-08-07 | End: 2020-08-07 | Stop reason: HOSPADM

## 2020-08-07 RX ORDER — OXYCODONE HYDROCHLORIDE 5 MG/1
5 TABLET ORAL EVERY 6 HOURS PRN
Status: DISCONTINUED | OUTPATIENT
Start: 2020-08-07 | End: 2020-08-07 | Stop reason: HOSPADM

## 2020-08-07 RX ORDER — ONDANSETRON 4 MG/1
4 TABLET, FILM COATED ORAL EVERY 6 HOURS PRN
Qty: 20 TABLET | Refills: 0 | Status: SHIPPED | OUTPATIENT
Start: 2020-08-07 | End: 2021-01-29

## 2020-08-07 RX ORDER — METRONIDAZOLE 500 MG/1
500 TABLET ORAL EVERY 12 HOURS
Qty: 20 TABLET | Refills: 0 | Status: SHIPPED | OUTPATIENT
Start: 2020-08-07 | End: 2020-08-17

## 2020-08-07 RX ORDER — OXYCODONE AND ACETAMINOPHEN 7.5; 325 MG/1; MG/1
1 TABLET ORAL EVERY 6 HOURS PRN
Qty: 28 TABLET | Refills: 0 | Status: SHIPPED | OUTPATIENT
Start: 2020-08-07 | End: 2020-08-07 | Stop reason: SDUPTHER

## 2020-08-07 RX ORDER — OXYCODONE AND ACETAMINOPHEN 7.5; 325 MG/1; MG/1
1 TABLET ORAL EVERY 6 HOURS PRN
Qty: 28 TABLET | Refills: 0 | Status: SHIPPED | OUTPATIENT
Start: 2020-08-07 | End: 2020-08-14 | Stop reason: SDUPTHER

## 2020-08-07 RX ORDER — CIPROFLOXACIN 500 MG/1
500 TABLET ORAL DAILY
Qty: 10 TABLET | Refills: 0 | Status: SHIPPED | OUTPATIENT
Start: 2020-08-07 | End: 2020-08-17

## 2020-08-07 RX ADMIN — MUPIROCIN 1 G: 20 OINTMENT TOPICAL at 10:08

## 2020-08-07 RX ADMIN — POLYETHYLENE GLYCOL 3350 17 G: 17 POWDER, FOR SOLUTION ORAL at 10:08

## 2020-08-07 RX ADMIN — DULOXETINE HYDROCHLORIDE 120 MG: 60 CAPSULE, DELAYED RELEASE ORAL at 10:08

## 2020-08-07 RX ADMIN — METOPROLOL SUCCINATE 50 MG: 50 TABLET, EXTENDED RELEASE ORAL at 10:08

## 2020-08-07 RX ADMIN — ACETAMINOPHEN 650 MG: 325 TABLET ORAL at 12:08

## 2020-08-07 RX ADMIN — CIPROFLOXACIN 400 MG: 2 INJECTION, SOLUTION INTRAVENOUS at 02:08

## 2020-08-07 RX ADMIN — CIPROFLOXACIN 400 MG: 2 INJECTION, SOLUTION INTRAVENOUS at 10:08

## 2020-08-07 RX ADMIN — HYDROMORPHONE HYDROCHLORIDE 0.5 MG: 1 INJECTION, SOLUTION INTRAMUSCULAR; INTRAVENOUS; SUBCUTANEOUS at 02:08

## 2020-08-07 RX ADMIN — HYDROMORPHONE HYDROCHLORIDE 0.5 MG: 1 INJECTION, SOLUTION INTRAMUSCULAR; INTRAVENOUS; SUBCUTANEOUS at 07:08

## 2020-08-07 RX ADMIN — METRONIDAZOLE 500 MG: 500 INJECTION, SOLUTION INTRAVENOUS at 10:08

## 2020-08-07 RX ADMIN — ACETAMINOPHEN 650 MG: 325 TABLET ORAL at 05:08

## 2020-08-07 RX ADMIN — PANTOPRAZOLE SODIUM 40 MG: 40 TABLET, DELAYED RELEASE ORAL at 10:08

## 2020-08-07 RX ADMIN — ACETAMINOPHEN 650 MG: 325 TABLET ORAL at 11:08

## 2020-08-07 RX ADMIN — METRONIDAZOLE 500 MG: 500 INJECTION, SOLUTION INTRAVENOUS at 02:08

## 2020-08-07 RX ADMIN — LISINOPRIL 40 MG: 20 TABLET ORAL at 10:08

## 2020-08-07 RX ADMIN — OXYCODONE HYDROCHLORIDE 5 MG: 5 TABLET ORAL at 10:08

## 2020-08-07 RX ADMIN — HYDROMORPHONE HYDROCHLORIDE 0.5 MG: 1 INJECTION, SOLUTION INTRAMUSCULAR; INTRAVENOUS; SUBCUTANEOUS at 11:08

## 2020-08-07 RX ADMIN — ENOXAPARIN SODIUM 135 MG: 150 INJECTION SUBCUTANEOUS at 10:08

## 2020-08-07 RX ADMIN — HYDROCHLOROTHIAZIDE 50 MG: 25 TABLET ORAL at 10:08

## 2020-08-07 RX ADMIN — OXYCODONE HYDROCHLORIDE 5 MG: 5 TABLET ORAL at 12:08

## 2020-08-07 NOTE — PLAN OF CARE
Patient will discharge with a wound vac. She will go home with [facility] home [facility]. Transport will be provided by family.      08/07/20 1232   Final Note   Assessment Type Final Discharge Note   Anticipated Discharge Disposition Home-Health   Hospital Follow Up  Appt(s) scheduled? Yes   Discharge plans and expectations educations in teach back method with documentation complete? Yes   Right Care Referral Info   Post Acute Recommendation Home-care   [facility]   Post-Acute Status   Post-Acute Authorization Home Health   Home Health Status Set-up Complete     Future Appointments   Date Time Provider Department Center   8/27/2020  8:30 AM Adelaida Hall MD [facility] HAND [facility]   9/8/2020  2:30 PM Gunjan Doty MD [facility] SLEEP [place]   9/22/2020  3:30 PM Debora Harding MD [facility]   1/29/2021  2:15 PM Gordy Cordero MD [facility] ESTHER [facility]     Juanita Schneider RN, CM   Ext: [phone]

## 2020-08-07 NOTE — ANESTHESIA POSTPROCEDURE EVALUATION
Anesthesia Post Evaluation    Patient: Vicky Alvarado    Procedure(s) Performed: Procedure(s) (LRB):  WASHOUT - left groin (Left)  DEBRIDEMENT, WOUND (Left)  CLOSURE, WOUND (Left)  APPLICATION, WOUND VAC (Left)    Final Anesthesia Type: general    Patient location during evaluation: PACU  Patient participation: Yes- Able to Participate  Level of consciousness: awake and alert and oriented  Post-procedure vital signs: reviewed and stable  Pain management: adequate  Airway patency: patent    PONV status at discharge: No PONV  Anesthetic complications: no      Cardiovascular status: blood pressure returned to baseline and hemodynamically stable  Respiratory status: unassisted, spontaneous ventilation and room air  Hydration status: euvolemic  Follow-up not needed.          Vitals Value Taken Time   /55 08/07/20 0838   Temp 36.4 °C (97.6 °F) 08/07/20 0838   Pulse 58 08/07/20 0838   Resp 18 08/07/20 0838   SpO2 99 % 08/07/20 0838         Event Time   Out of Recovery 08/06/2020 14:30:00         Pain/Tolu Score: Pain Rating Prior to Med Admin: 7 (8/7/2020  7:26 AM)  Pain Rating Post Med Admin: 5 (8/6/2020  4:45 PM)  Tolu Score: 10 (8/6/2020  4:45 PM)

## 2020-08-07 NOTE — SUBJECTIVE & OBJECTIVE
Interval History: NAEON. Patient complaining of pain to the left groin. Otherwise, denies n/v/f/c. Denies chest pain or SOB.     Medications:  Continuous Infusions:  Scheduled Meds:   acetaminophen  650 mg Oral Q6H    ciprofloxacin  400 mg Intravenous Q8H    DULoxetine  120 mg Oral Daily    enoxaparin  1 mg/kg Subcutaneous BID    hydroCHLOROthiazide  50 mg Oral Daily    lisinopriL  40 mg Oral Daily    metoprolol succinate  50 mg Oral BID    metronidazole  500 mg Intravenous Q8H    mupirocin  1 g Nasal BID    pantoprazole  40 mg Oral Daily    polyethylene glycol  17 g Oral Daily     PRN Meds:HYDROcodone-acetaminophen, HYDROmorphone, ondansetron, ondansetron, oxyCODONE, promethazine (PHENERGAN) IVPB, sodium chloride 0.9%, traZODone     Review of patient's allergies indicates:  No Known Allergies  Objective:     Vital Signs (Most Recent):  Temp: 97.8 °F (36.6 °C) (08/07/20 0347)  Pulse: (!) 52 (08/07/20 0600)  Resp: 16 (08/07/20 0726)  BP: (!) 100/52 (08/07/20 0347)  SpO2: 96 % (08/07/20 0347) Vital Signs (24h Range):  Temp:  [97.5 °F (36.4 °C)-98.7 °F (37.1 °C)] 97.8 °F (36.6 °C)  Pulse:  [52-70] 52  Resp:  [14-20] 16  SpO2:  [92 %-100 %] 96 %  BP: (100-156)/(52-67) 100/52     Weight: (!) 138.3 kg (304 lb 14.3 oz)  Body mass index is 49.21 kg/m².    Intake/Output - Last 3 Shifts       08/05 0700 - 08/06 0659 08/06 0700 - 08/07 0659 08/07 0700 - 08/08 0659    P.O. 1440      I.V. (mL/kg) 0 (0) 800 (5.8)     Other 0      IV Piggyback 900 900     Total Intake(mL/kg) 2340 (16.9) 1700 (12.3)     Urine (mL/kg/hr) 2750 (0.8) 2000 (0.6)     Emesis/NG output 0 0     Other 200 51     Stool 0 0     Blood 0 0     Total Output 2950 2051     Net -610 -351            Urine Occurrence 4 x 3 x     Stool Occurrence 2 x 0 x     Emesis Occurrence 0 x 0 x           Physical Exam  Constitutional:       General: She is not in acute distress.     Appearance: Normal appearance. She is obese.   HENT:      Head: Normocephalic and  atraumatic.      Right Ear: External ear normal.      Left Ear: External ear normal.      Nose: Nose normal.      Mouth/Throat:      Mouth: Mucous membranes are moist.      Pharynx: Oropharynx is clear.   Eyes:      Extraocular Movements: Extraocular movements intact.      Pupils: Pupils are equal, round, and reactive to light.   Neck:      Musculoskeletal: Normal range of motion.   Cardiovascular:      Rate and Rhythm: Normal rate.      Pulses: Normal pulses.      Comments: Palpable pulses distal to wound vac.  Pulmonary:      Effort: Pulmonary effort is normal. No respiratory distress.   Abdominal:      General: There is no distension.      Palpations: Abdomen is soft.   Musculoskeletal:      Comments: L groin wound vac in place with SS output, good seal, no leak.    Skin:     General: Skin is warm and dry.   Neurological:      General: No focal deficit present.      Mental Status: She is alert and oriented to person, place, and time.   Psychiatric:         Mood and Affect: Mood normal.         Behavior: Behavior normal.         Significant Labs:  CBC:   Recent Labs   Lab 08/06/20  0628   WBC 8.75   RBC 3.34*   HGB 8.9*   HCT 30.5*      MCV 91   MCH 26.6*   MCHC 29.2*     BMP:   Recent Labs   Lab 08/07/20  0508   *   *   K 3.9      CO2 24   BUN 9   CREATININE 0.8   CALCIUM 8.6*     CMP:   Recent Labs   Lab 08/03/20  0419  08/07/20  0508   GLU 96   < > 159*   CALCIUM 8.8   < > 8.6*   ALBUMIN 3.1*  --   --    PROT 6.9  --   --    *   < > 134*   K 3.9   < > 3.9   CO2 27   < > 24   CL 99   < > 100   BUN 14   < > 9   CREATININE 1.0   < > 0.8   ALKPHOS 81  --   --    ALT 17  --   --    AST 23  --   --    BILITOT 0.5  --   --     < > = values in this interval not displayed.       Significant Diagnostics:  I have reviewed all pertinent imaging results/findings within the past 24 hours.

## 2020-08-07 NOTE — OP NOTE
[facility]  General Surgery  Operative Note    SUMMARY     Date of Procedure: 8/6/2020     Procedure:  Left groin exploration, washout, debridement, partial closure, application of negative wound pressure device    Surgeon(s) and Role:     * SEMAJ Márquez III, MD - Primary     * Moose Grace MD - Resident - Assisting     * Gerson Child MD - Fellow     * Loco Hamilton MD        Pre-Operative Diagnosis: Hematoma of groin, subsequent encounter [S30.1XXD]    Post-Operative Diagnosis: Post-Op Diagnosis Codes:     * Hematoma of groin, subsequent encounter [S30.1XXD]    Anesthesia:  General    Description of the Findings of the Procedure:     After informed consent was obtained the patient was brought to the operating theater and laid supine.  Anesthesia was induced.  Groin was prepped and draped in standard fashion from the umbilicus to the left knee with Betadine.  After time-out the wound was explored with the help of Plastic surgery.  No purulence was encountered.  However there was a large cavity that had not closed.  The common femoral artery was located via palpation to be deep to the area we were working.  With the help of Plastic surgery the wound was curetted and the lateral portions of the cavity were closed down with a running Vicryl suture.  The area of the wound more superior was debrided to healthy bleeding tissue and the edges were excised.  This was also partially closed with Vicryl suture.  The negative pressure wound device was then applied and had good seal at the end of the case.  The patient was extubated and taken to the postanesthesia care unit in good condition      Complications: No    Estimated Blood Loss (EBL):  20 mL           Implants: * No implants in log *    Specimens:   Specimen (12h ago, onward)    None                  Condition: Good    Disposition: PACU - hemodynamically stable.

## 2020-08-07 NOTE — PROGRESS NOTES
[facility]  Plastic Surgery  Progress Note    Subjective:     History of Present Illness:  No notes on file    Post-Op Info:  Procedure(s) (LRB):  WASHOUT - left groin (Left)  DEBRIDEMENT, WOUND (Left)  CLOSURE, WOUND (Left)  APPLICATION, WOUND VAC (Left)   1 Day Post-Op     Interval History: NAEON. Patient complaining of pain to the left groin. Otherwise, denies n/v/f/c. Denies chest pain or SOB.     Medications:  Continuous Infusions:  Scheduled Meds:   acetaminophen  650 mg Oral Q6H    ciprofloxacin  400 mg Intravenous Q8H    DULoxetine  120 mg Oral Daily    enoxaparin  1 mg/kg Subcutaneous BID    hydroCHLOROthiazide  50 mg Oral Daily    lisinopriL  40 mg Oral Daily    metoprolol succinate  50 mg Oral BID    metronidazole  500 mg Intravenous Q8H    mupirocin  1 g Nasal BID    pantoprazole  40 mg Oral Daily    polyethylene glycol  17 g Oral Daily     PRN Meds:HYDROcodone-acetaminophen, HYDROmorphone, ondansetron, ondansetron, oxyCODONE, promethazine (PHENERGAN) IVPB, sodium chloride 0.9%, traZODone     Review of patient's allergies indicates:  No Known Allergies  Objective:     Vital Signs (Most Recent):  Temp: 97.8 °F (36.6 °C) (08/07/20 0347)  Pulse: (!) 52 (08/07/20 0600)  Resp: 16 (08/07/20 0726)  BP: (!) 100/52 (08/07/20 0347)  SpO2: 96 % (08/07/20 0347) Vital Signs (24h Range):  Temp:  [97.5 °F (36.4 °C)-98.7 °F (37.1 °C)] 97.8 °F (36.6 °C)  Pulse:  [52-70] 52  Resp:  [14-20] 16  SpO2:  [92 %-100 %] 96 %  BP: (100-156)/(52-67) 100/52     Weight: (!) 138.3 kg (304 lb 14.3 oz)  Body mass index is 49.21 kg/m².    Intake/Output - Last 3 Shifts       08/05 0700 - 08/06 0659 08/06 0700 - 08/07 0659 08/07 0700 - 08/08 0659    P.O. 1440      I.V. (mL/kg) 0 (0) 800 (5.8)     Other 0      IV Piggyback 900 900     Total Intake(mL/kg) 2340 (16.9) 1700 (12.3)     Urine (mL/kg/hr) 2750 (0.8) 2000 (0.6)     Emesis/NG output 0 0     Other 200 51     Stool 0 0     Blood 0 0     Total Output  2953 2051     Net -610 -164            Urine Occurrence 4 x 3 x     Stool Occurrence 2 x 0 x     Emesis Occurrence 0 x 0 x           Physical Exam  Constitutional:       General: She is not in acute distress.     Appearance: Normal appearance. She is obese.   HENT:      Head: Normocephalic and atraumatic.      Right Ear: External ear normal.      Left Ear: External ear normal.      Nose: Nose normal.      Mouth/Throat:      Mouth: Mucous membranes are moist.      Pharynx: Oropharynx is clear.   Eyes:      Extraocular Movements: Extraocular movements intact.      Pupils: Pupils are equal, round, and reactive to light.   Neck:      Musculoskeletal: Normal range of motion.   Cardiovascular:      Rate and Rhythm: Normal rate.      Pulses: Normal pulses.      Comments: Palpable pulses distal to wound vac.  Pulmonary:      Effort: Pulmonary effort is normal. No respiratory distress.   Abdominal:      General: There is no distension.      Palpations: Abdomen is soft.   Musculoskeletal:      Comments: L groin wound vac in place with SS output, good seal, no leak.    Skin:     General: Skin is warm and dry.   Neurological:      General: No focal deficit present.      Mental Status: She is alert and oriented to person, place, and time.   Psychiatric:         Mood and Affect: Mood normal.         Behavior: Behavior normal.         Significant Labs:  CBC:   Recent Labs   Lab 08/06/20  0628   WBC 8.75   RBC 3.34*   HGB 8.9*   HCT 30.5*      MCV 91   MCH 26.6*   MCHC 29.2*     BMP:   Recent Labs   Lab 08/07/20  0508   *   *   K 3.9      CO2 24   BUN 9   CREATININE 0.8   CALCIUM 8.6*     CMP:   Recent Labs   Lab 08/03/20  0419  08/07/20  0508   GLU 96   < > 159*   CALCIUM 8.8   < > 8.6*   ALBUMIN 3.1*  --   --    PROT 6.9  --   --    *   < > 134*   K 3.9   < > 3.9   CO2 27   < > 24   CL 99   < > 100   BUN 14   < > 9   CREATININE 1.0   < > 0.8   ALKPHOS 81  --   --    ALT 17  --   --    AST 23  --    --    BILITOT 0.5  --   --     < > = values in this interval not displayed.       Significant Diagnostics:  I have reviewed all pertinent imaging results/findings within the past 24 hours.    Assessment/Plan:     * Wound dehiscence  Vicky Alvarado is a 55 y.o. female s/p urgent left SFA branch laceration repair after atrial ablation via femoral approach, evacuation 20 cm hematoma 7/21/2020. Returned to ED 8/3 with wound breakdown. Taken to OR 8/3 for washout and vac placement, returned to OR 8/6 for additional washout and partial closure.  - continue abx, narrow as cultures allow  - regular diet  - home meds restarted  - PRN pain meds  - DVT ppx  - continue wound vac to suction        Jonathan Castellano MD  Plastic Surgery  [facility]

## 2020-08-07 NOTE — PROGRESS NOTES
[facility]  Vascular Surgery  Progress Note    Patient Name: Vicky Alvarado  MRN: [id number]  Admission Date: 8/2/2020  Primary Care Provider: Gordy Cordero MD    Subjective:     Interval History: NAEO    Post-Op Info:  Procedure(s) (LRB):  WASHOUT - left groin (Left)  DEBRIDEMENT, WOUND (Left)  CLOSURE, WOUND (Left)  APPLICATION, WOUND VAC (Left)   1 Day Post-Op       Medications:  Continuous Infusions:  Scheduled Meds:   acetaminophen  650 mg Oral Q6H    ciprofloxacin  400 mg Intravenous Q8H    DULoxetine  120 mg Oral Daily    enoxaparin  1 mg/kg Subcutaneous BID    hydroCHLOROthiazide  50 mg Oral Daily    lisinopriL  40 mg Oral Daily    metoprolol succinate  50 mg Oral BID    metronidazole  500 mg Intravenous Q8H    mupirocin  1 g Nasal BID    pantoprazole  40 mg Oral Daily    polyethylene glycol  17 g Oral Daily     PRN Meds:HYDROcodone-acetaminophen, HYDROmorphone, ondansetron, ondansetron, oxyCODONE, promethazine (PHENERGAN) IVPB, sodium chloride 0.9%, traZODone     Objective:     Vital Signs (Most Recent):  Temp: 97.6 °F (36.4 °C) (08/07/20 0838)  Pulse: (!) 54 (08/07/20 1059)  Resp: 20 (08/07/20 1025)  BP: (!) 111/55 (08/07/20 0838)  SpO2: 99 % (08/07/20 0838) Vital Signs (24h Range):  Temp:  [97.5 °F (36.4 °C)-97.9 °F (36.6 °C)] 97.6 °F (36.4 °C)  Pulse:  [52-70] 54  Resp:  [14-20] 20  SpO2:  [92 %-100 %] 99 %  BP: (100-156)/(52-67) 111/55         Physical Exam  Vitals signs and nursing note reviewed.   Constitutional:       Appearance: Normal appearance.   Pulmonary:      Effort: Pulmonary effort is normal.   Musculoskeletal:      Comments: Left groin with wound vac in place     Neurological:      Mental Status: She is alert.         Significant Labs:  CBC:   Recent Labs   Lab 08/07/20  0509   WBC 11.66   RBC 3.24*   HGB 8.8*   HCT 28.7*      MCV 89   MCH 27.2   MCHC 30.7*     CMP:   Recent Labs   Lab 08/07/20  0508   *   CALCIUM 8.6*   *   K 3.9    CO2 24      BUN 9   CREATININE 0.8       Significant Diagnostics:  I have reviewed and interpreted all pertinent imaging results/findings within the past 24 hours.    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 Was then discharged with drains but returned to ED 8/3 with wound breakdown. Taken to OR 8/3 for washout and vac placement. S/p washout, wound vac change 8/6 and deep closure by plastics. No further plastics intervention warranted - remainder of wound healing will be managed with wound vac changes.     - Okay to DC today if sw can coordinate home wound care for MWF vac changes    - cipro/flagyl for cultures positive for proteus and e coli  - regular diet  - home meds  - PRN pain meds  - DVT ppx           Eladia Curiel MD  Vascular Surgery  [facility]

## 2020-08-07 NOTE — TELEPHONE ENCOUNTER
Contacted patient in response to message. Appointment scheduled, pt verified. Appt letter placed in mail. ----- Message from Juanita Schneider RN sent at 8/7/2020 12:31 PM CDT -----  This patient needs a post op appointment for a week. Please call patient to set up the appointment.     Thanks

## 2020-08-07 NOTE — ASSESSMENT & PLAN NOTE
Vicky Alvarado is a 55 y.o. female s/p urgent left SFA branch laceration repair after atrial ablation via femoral approach, evacuation 20 cm hematoma 7/21/2020. Returned to ED 8/3 with wound breakdown. Taken to OR 8/3 for washout and vac placement, returned to OR 8/6 for additional washout and partial closure.  - continue abx, narrow as cultures allow  - regular diet  - home meds restarted  - PRN pain meds  - DVT ppx  - continue wound vac to suction

## 2020-08-07 NOTE — SUBJECTIVE & OBJECTIVE
Medications:  Continuous Infusions:  Scheduled Meds:   acetaminophen  650 mg Oral Q6H    ciprofloxacin  400 mg Intravenous Q8H    DULoxetine  120 mg Oral Daily    enoxaparin  1 mg/kg Subcutaneous BID    hydroCHLOROthiazide  50 mg Oral Daily    lisinopriL  40 mg Oral Daily    metoprolol succinate  50 mg Oral BID    metronidazole  500 mg Intravenous Q8H    mupirocin  1 g Nasal BID    pantoprazole  40 mg Oral Daily    polyethylene glycol  17 g Oral Daily     PRN Meds:HYDROcodone-acetaminophen, HYDROmorphone, ondansetron, ondansetron, oxyCODONE, promethazine (PHENERGAN) IVPB, sodium chloride 0.9%, traZODone     Objective:     Vital Signs (Most Recent):  Temp: 97.6 °F (36.4 °C) (08/07/20 0838)  Pulse: (!) 54 (08/07/20 1059)  Resp: 20 (08/07/20 1025)  BP: (!) 111/55 (08/07/20 0838)  SpO2: 99 % (08/07/20 0838) Vital Signs (24h Range):  Temp:  [97.5 °F (36.4 °C)-97.9 °F (36.6 °C)] 97.6 °F (36.4 °C)  Pulse:  [52-70] 54  Resp:  [14-20] 20  SpO2:  [92 %-100 %] 99 %  BP: (100-156)/(52-67) 111/55         Physical Exam  Vitals signs and nursing note reviewed.   Constitutional:       Appearance: Normal appearance.   Pulmonary:      Effort: Pulmonary effort is normal.   Musculoskeletal:      Comments: Left groin with wound vac in place     Neurological:      Mental Status: She is alert.         Significant Labs:  CBC:   Recent Labs   Lab 08/07/20  0509   WBC 11.66   RBC 3.24*   HGB 8.8*   HCT 28.7*      MCV 89   MCH 27.2   MCHC 30.7*     CMP:   Recent Labs   Lab 08/07/20  0508   *   CALCIUM 8.6*   *   K 3.9   CO2 24      BUN 9   CREATININE 0.8       Significant Diagnostics:  I have reviewed and interpreted all pertinent imaging results/findings within the past 24 hours.

## 2020-08-07 NOTE — ASSESSMENT & PLAN NOTE
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 Was then discharged with drains but returned to ED 8/3 with wound breakdown. Taken to OR 8/3 for washout and vac placement. S/p washout, wound vac change 8/6 and deep closure by plastics. No further plastics intervention warranted - remainder of wound healing will be managed with wound vac changes.     - Okay to DC today if sw can coordinate home wound care for MWF vac changes    - cipro/flagyl for cultures positive for proteus and e coli  - regular diet  - home meds  - PRN pain meds  - DVT ppx

## 2020-08-07 NOTE — PLAN OF CARE
[facility]    HOME HEALTH ORDERS  FACE TO FACE ENCOUNTER    Patient Name: Vicky Alvarado  YOB: 1965    PCP: Gordy Cordero MD   PCP Address: [address] / [place] [address]  PCP Phone Number: [phone]  PCP Fax: [phone]    Encounter Date: 08/07/2020    Admit to Home Health    Diagnoses:  Active Hospital Problems    Diagnosis  POA    *Wound dehiscence [T81.30XA]  Yes    Atrial Fibrillation (paroxysmal) [I48.0]  Yes    Chronic diastolic heart failure [I50.22]  Yes    Hypertension [I10]  Yes    Severe obesity (BMI >= 40) [E66.01]  Yes      Resolved Hospital Problems   No resolved problems to display.       Future Appointments   Date Time Provider Department Center   8/27/2020  8:30 AM Adelaida Hall MD [facility] HAND [facility]   9/8/2020  2:30 PM Gunjan Doty MD [facility] SLEEP [place]   9/22/2020  3:30 PM Debora Harding MD [facility]   1/29/2021  2:15 PM Gordy Cordero MD [facility]           I have seen and examined this patient face to face today. My clinical findings that support the need for the home health skilled services and home bound status are the following:  Medical restrictions requiring assistance of another human to leave home due to  Wound care needs.    Allergies:Review of patient's allergies indicates:  No Known Allergies    Diet: regular diet    Activities: activity as tolerated and ambulate in house    Nursing:   SN to complete comprehensive assessment including routine vital signs. Instruct on disease process and s/s of complications to report to MD. Review/verify medication list sent home with the patient at time of discharge  and instruct patient/caregiver as needed. Frequency may be adjusted depending on start of care date.    Notify MD if SBP > 160 or < 90; DBP > 90 or < 50; HR > 120 or < 50; Temp > 101; Other:        CONSULTS:    None    WOUND CARE ORDERS:    Wound Vac:   Location: left groin             Dressing changes every Monday, Wednesday and Friday.        ET Consult      Medications: Review discharge medications with patient and family and provide education.      Current Discharge Medication List      START taking these medications    Details   ciprofloxacin (CIPRO XR) 500 mg tablet Take 1 tablet (500 mg total) by mouth once daily. for 10 days  Qty: 10 tablet, Refills: 0      metroNIDAZOLE (FLAGYL) 500 MG tablet Take 1 tablet (500 mg total) by mouth every 12 (twelve) hours. for 10 days  Qty: 20 tablet, Refills: 0      oxyCODONE-acetaminophen (PERCOCET) 7.5-325 mg per tablet Take 1 tablet by mouth every 6 (six) hours as needed for Pain.  Qty: 28 tablet, Refills: 0         CONTINUE these medications which have CHANGED    Details   !! ondansetron (ZOFRAN) 4 MG tablet Take 1 tablet (4 mg total) by mouth every 6 (six) hours as needed for Nausea.  Qty: 20 tablet, Refills: 0      !! ondansetron (ZOFRAN) 4 MG tablet Take 1 tablet (4 mg total) by mouth every 6 (six) hours as needed.  Qty: 20 tablet, Refills: 0       !! - Potential duplicate medications found. Please discuss with provider.      CONTINUE these medications which have NOT CHANGED    Details   apixaban (ELIQUIS) 5 mg Tab Take 1 tablet (5 mg total) by mouth 2 (two) times daily.  Qty: 60 tablet, Refills: 6      b complex vitamins capsule Take 1 capsule by mouth once daily.      cephALEXin (KEFLEX) 500 MG capsule Take 1 capsule (500 mg total) by mouth 4 (four) times daily. for 7 days  Qty: 28 capsule, Refills: 0    Associated Diagnoses: Postoperative cellulitis of surgical wound      DULoxetine (CYMBALTA) 60 MG capsule Take 2 capsules (120 mg total) by mouth once daily.  Qty: 180 capsule, Refills: 3      furosemide (LASIX) 20 MG tablet Take 1 tablet (20 mg total) by mouth daily as needed (fluid).  Qty: 30 tablet, Refills: 6    Associated Diagnoses: Leg edema      gluc-shikha-[facility]#3-L-pacd-reymundo-bor 750 mg-644 mg- 30 mg-1 mg Tab Take 1 tablet by mouth  once daily.       hydroCHLOROthiazide (HYDRODIURIL) 50 MG tablet Please hold (do not take) until seen by Primary Care Physician  Qty: 90 tablet, Refills: 5    Comments: .      HYDROcodone-acetaminophen (NORCO) 5-325 mg per tablet Take 1 tablet by mouth every 6 (six) hours as needed for Pain.  Qty: 20 tablet, Refills: 0      krill/om-3/dha/epa/phospho/ast (MEGARED OMEGA-3 KRILL OIL ORAL)       lisinopriL (PRINIVIL,ZESTRIL) 40 MG tablet Please hold (do not take) until seen by Primary Care Physician  Qty: 90 tablet, Refills: 5      MELATONIN ORAL Take 1-2 tablets by mouth nightly as needed (Sleep).      metoprolol succinate (TOPROL-XL) 50 MG 24 hr tablet Take 1 tablet (50 mg total) by mouth 2 (two) times daily.  Qty: 90 tablet, Refills: 6    Comments: .      multivitamin (THERAGRAN) per tablet Take 1 tablet by mouth once daily.      omeprazole (PRILOSEC) 20 MG capsule TAKE 1 CAPSULE BY MOUTH ONCE DAILY  Qty: 90 capsule, Refills: 3      traMADoL (ULTRAM) 50 mg tablet Take 1 tablet (50 mg total) by mouth every 8 (eight) hours as needed for Pain.  Qty: 15 tablet, Refills: 0    Comments: Quantity prescribed more than 7 day supply? No      traZODone (DESYREL) 100 MG tablet Take 1 tablet (100 mg total) by mouth nightly as needed for Insomnia.  Qty: 90 tablet, Refills: 3             I certify that this patient is confined to her home and needs intermittent skilled nursing care.      Eladia Curiel MD  Vascular Surgery

## 2020-08-07 NOTE — PLAN OF CARE
08/07/20 1522   Post-Acute Status   Post-Acute Authorization HME;Home Health   HME Status Set-up Complete   Home Health Status Set-up Complete   [facility].  Pt accepted by [facility].    Noemí Hernández LMSW  [facility]  [id number]

## 2020-08-07 NOTE — PLAN OF CARE
Plan of care reviewed with pt. Verbalized understanding. Pt AAOx4. Pt on tele running NSR-SB. VS stable on RA. Pain not well managed w/ PRN meds. Notified MD and put in orders for breakthrough pain meds. Pt's pain is better managed.    L groin site w/ wound vac is intact. Wound vac ran continuously at 125mmHg. Other L groin site is pink/red. No dressing.      Pt ambulates independently to BR, adequate output. Pt tolerating cardiac diet. No complaints of nausea. Frequent rounds made for pt safety. Bed low and locked, call light in reach. WCTM

## 2020-08-09 ENCOUNTER — HOSPITAL ENCOUNTER (EMERGENCY)
Facility: HOSPITAL | Age: 55
Discharge: HOME | End: 2020-08-09
Attending: EMERGENCY MEDICINE
Payer: COMMERCIAL

## 2020-08-09 VITALS
RESPIRATION RATE: 17 BRPM | DIASTOLIC BLOOD PRESSURE: 89 MMHG | TEMPERATURE: 98 F | WEIGHT: 293 LBS | HEART RATE: 74 BPM | OXYGEN SATURATION: 99 % | BODY MASS INDEX: 47.09 KG/M2 | SYSTOLIC BLOOD PRESSURE: 136 MMHG | HEIGHT: 66 IN

## 2020-08-09 DIAGNOSIS — Z48.89 ENCOUNTER FOR POST SURGICAL WOUND CHECK: Primary | ICD-10-CM

## 2020-08-09 PROCEDURE — 99283 PR EMERGENCY DEPT VISIT,LEVEL III: ICD-10-PCS | Mod: ,,, | Performed by: EMERGENCY MEDICINE

## 2020-08-09 PROCEDURE — 99281 EMR DPT VST MAYX REQ PHY/QHP: CPT

## 2020-08-09 PROCEDURE — 99283 EMERGENCY DEPT VISIT LOW MDM: CPT | Mod: ,,, | Performed by: EMERGENCY MEDICINE

## 2020-08-09 NOTE — ED PROVIDER NOTES
"Encounter Date: 8/9/2020       History     Chief Complaint   Patient presents with    Post-op Problem     + wound to left groin, last debridement on Thursday. " It's gross and getting worse". Denies fever or chills.      This is a 55 year old female with a PMH of HTN, Afib who presents to the ED for a wound check. She underwent left groin exploration, washout, debridement, partial closure, application of negative wound pressure device on 8/6. This was after a direct repair of L SFA branch laceration following left femoral access for PCI. Patient presents to the ED today with concerns regarding her wound vac suction becoming disrupted. She continues to experience copious sanguinous drainage saturating her dressing once per hour. The home health nurse was unable to see her until tomorrow and referred her to the ED.        Review of patient's allergies indicates:  No Known Allergies  Past Medical History:   Diagnosis Date    Atrial fibrillation     cardioversion    Avascular necrosis     L hand    Depression     GERD (gastroesophageal reflux disease)     Hx of psychiatric care     Hypertension     Psychiatric problem      Past Surgical History:   Procedure Laterality Date    ABLATION OF ARRHYTHMOGENIC FOCUS FOR ATRIAL FIBRILLATION N/A 7/20/2020    Procedure: Ablation atrial fibrillation;  Surgeon: Gabriel Hawley MD;  Location: [facility] EP LAB;  Service: Cardiology;  Laterality: N/A;  afib, PVI, RFA, REENA, SHADY, anes, MB, 3 Prep    APPLICATION OF WOUND VACUUM-ASSISTED CLOSURE DEVICE Left 8/3/2020    Procedure: APPLICATION, WOUND VAC;  Surgeon: SEMAJ Márquez III, MD;  Location: [facility] OR [address] [place] [address];  Service: Peripheral Vascular;  Laterality: Left;    APPLICATION OF WOUND VACUUM-ASSISTED CLOSURE DEVICE Left 8/6/2020    Procedure: APPLICATION, WOUND VAC;  Surgeon: SEMAJ Márquez III, MD;  Location: [facility] OR [address] [place] [address];  Service: Peripheral Vascular;  Laterality: Left;    CARPAL TUNNEL RELEASE Right 6/10/2020    " Procedure: RELEASE, CARPAL TUNNEL - RIGHT;  Surgeon: Adelaida Hall MD;  Location: [facility] OR;  Service: Orthopedics;  Laterality: Right;  GENERAL AND REGIONAL    CLOSURE OF WOUND Left 2020    Procedure: CLOSURE, WOUND;  Surgeon: SEMAJ Márquez III, MD;  [facility];  Service: Peripheral Vascular;  Laterality: Left;  Complex    EXPLORATION OF FEMORAL ARTERY Left 2020    Procedure: EXPLORATION, ARTERY, FEMORAL;  Surgeon: SEMAJ Márquez III, MD;  [facility];  Service: Peripheral Vascular;  Laterality: Left;  1. Urgent Direct repair, L SFA branch laceration    FOOT SURGERY      TREATMENT OF CARDIAC ARRHYTHMIA N/A 2020    Procedure: CARDIOVERSION;  Surgeon: Gabriel Hawley MD;  Location: [facility] EP LAB;  Service: Cardiology;  Laterality: N/A;  af, demi, dccv, anes, mb, 345    WOUND DEBRIDEMENT Left 2020    Procedure: DEBRIDEMENT, WOUND;  Surgeon: SEMAJ Márquez III, MD;  [facility];  Service: Peripheral Vascular;  Laterality: Left;     Family History   Problem Relation Age of Onset    Cataracts Mother     Hypertension Mother     Thyroid disease Mother     Diabetes Father     Hypertension Father     Hypertension Sister     Hypertension Brother     Amblyopia Neg Hx     Blindness Neg Hx     Cancer Neg Hx     Glaucoma Neg Hx     Macular degeneration Neg Hx     Retinal detachment Neg Hx     Strabismus Neg Hx     Stroke Neg Hx      Social History     Tobacco Use    Smoking status: Former Smoker     Types: Cigarettes     Quit date: 2019     Years since quittin.1    Smokeless tobacco: Never Used   Substance Use Topics    Alcohol use: No    Drug use: No     Review of Systems   Constitutional: Negative for fever.   Respiratory: Negative for shortness of breath.    Cardiovascular: Negative for chest pain.   Gastrointestinal: Negative for abdominal pain, nausea and vomiting.   Skin: Positive for wound.       Physical Exam      Initial Vitals [08/09/20 1347]   BP Pulse Resp Temp SpO2   (!) 133/93 72 16 98.1 °F (36.7 °C) 99 %      MAP       --         Physical Exam    Constitutional: She appears well-developed and well-nourished. She is Obese . No distress.   HENT:   Head: Atraumatic.   Eyes: Conjunctivae and EOM are normal. Pupils are equal, round, and reactive to light.   Cardiovascular: Normal rate, regular rhythm and normal heart sounds.   Pulmonary/Chest: Breath sounds normal. No respiratory distress. She has no wheezes. She has no rhonchi. She has no rales.   Abdominal: Soft. Bowel sounds are normal. There is no abdominal tenderness.   Neurological: She is alert and oriented to person, place, and time.   Skin: Skin is warm and dry.                  ED Course   Procedures  Labs Reviewed - No data to display       Imaging Results    None          Medical Decision Making:   Other:   I have discussed this case with another health care provider.       APC / Resident Notes:   55 year old female presenting for wound vac replacement.  Discussed with vascular surgery  Surgery team replaced wound vac with good seal in the ED  She will be discharged home, continue with home health and outpatient f/u as planned                            Clinical Impression:       ICD-10-CM ICD-9-CM   1. Encounter for post surgical wound check  Z48.89 V58.49         Disposition:   Disposition: Discharged  Condition: Stable     ED Disposition Condition    Discharge Stable        ED Prescriptions     None        Follow-up Information     Follow up With Specialties Details Why Contact Info Additional Information    Gordy Cordero MD Internal Medicine   [address]  [address]  [facility] [id number] [phone]       [facility]   [address] [address] [phone] 5th Floor Clinic Raven Whitaker PA-C  08/09/20 4501

## 2020-08-09 NOTE — ED NOTES
LOC: The patient is awake, alert, and oriented to place, time, situation. Affect is appropriate.  Speech is appropriate and clear.     APPEARANCE: Patient resting comfortably in no acute distress.  Patient is clean and well groomed.    SKIN: The skin is warm and dry; color consistent with ethnicity.  Patient has normal skin turgor and moist mucus membranes.  Left groin wound draining from previous surgery, wound vac dressing on top of it.      MUSCULOSKELETAL: Patient moving upper and lower extremities without difficulty.  Denies weakness.     RESPIRATORY: Airway is open and patent. Respirations spontaneous, even, easy, and non-labored.  Patient has a normal effort and rate.  No accessory muscle use noted. Denies cough.     CARDIAC:  Normal rhythm and rate noted.  No peripheral edema noted. No complaints of chest pain.      ABDOMEN: Soft and non tender to palpation.  No distention noted.     NEUROLOGIC: Eyes open spontaneously.  Behavior appropriate to situation.  Follows commands; facial expression symmetrical.  Purposeful motor response noted; normal sensation in all extremities.

## 2020-08-09 NOTE — ED TRIAGE NOTES
Pt reports to the ED after the tubing of her wound vac came loose and her wound started draining at home. Pt has home health but home health couldn't make it out to assess the problem.

## 2020-08-10 ENCOUNTER — PATIENT OUTREACH (OUTPATIENT)
Dept: ADMINISTRATIVE | Facility: CLINIC | Age: 55
End: 2020-08-10

## 2020-08-10 NOTE — DISCHARGE SUMMARY
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   She had undergone a recent ablation for atrial fibrillation.     This high risk left femoral incision was closed primarily with 2 large drains  And a Preventa VAC was placed.  One drain was removed prior to her discharge postoperative day 3.     On Tuesday 7/28/20 she returned to the vascular office:  She has been having adequate pain control with tramadol.   She reports that the existing drain has put out over 300-500 cc per day over the weekend, Down to 100-200 cc per day which appears mostly serous.     Today, 8/2/20, she presented to the ER for evaluation for wound dehiscence. She reported higher drainage from the groin over the past several days. She was seen by her PCP 7/31/20, who felt the wound looked OK, but started her on Keflex as a precaution. Over the weekend, the wound began to drain more, and partially dehisced.    Procedure(s) (LRB):  WASHOUT - left groin (Left)  DEBRIDEMENT, WOUND (Left)  CLOSURE, WOUND (Left)  APPLICATION, WOUND VAC (Left)     Hospital Course: Admitted Sunday 8/2/2020 for wound dehiscence. She was taken to OR 8/3/2020 for wound exploration, washout, and wound vac placement. Wound cultures were also obtained.   She was monitored on the floor until planned take-back to the OR 8/6/2020 for additional washout and wound vac change in conjunction with plastic surgery who performed deep layer closure.    She was discharged Friday 8/7/2020 with  orders for MWF wound vac changes and  home ABX for positive wound cultures.         Consults:   Consults (From admission, onward)        Status Ordering Provider     Inpatient consult to Plastic Surgery  Once     Provider:  (Not yet assigned)    Completed CORY WILKERSON          Significant Diagnostic Studies: Labs: Wound culture 8/3/2020: BACTEROIDES VULGATUS, PROTEUS MIRABILIS, ESCHERICHIA COLI :     Pending Diagnostic Studies:     Procedure Component Value Units Date/Time    Protime-INR [[id number]] Collected: 08/02/20 1517    Order Status: Sent Lab Status: In process Updated: 08/02/20 1547    Specimen: Blood         Final Active Diagnoses:    Diagnosis Date Noted POA    PRINCIPAL PROBLEM:  Wound dehiscence [T81.30XA] 08/02/2020 Yes    Atrial Fibrillation (paroxysmal) [I48.0] 02/26/2020 Yes    Chronic diastolic heart failure [I50.22] 01/28/2020 Yes    Hypertension [I10] 10/12/2015 Yes    Severe obesity (BMI >= 40) [E66.01] 10/12/2015 Yes      Problems Resolved During this Admission:      Discharged Condition: good    Disposition: Home or Self Care    Follow Up:  Follow-up Information     SEMAJ Márquez Iii, MD In 1 week.    Specialty: Vascular Surgery  Why: Nurse will call you with an appointment   Contact information:  [address] [facility] [address]  [phone]                   Patient Instructions:      Sponge bath only until clinic visit     Notify your health care provider if you experience any of the following:  temperature >100.4     Notify your health care provider if you experience any of the following:  persistent nausea and vomiting or diarrhea     Notify your health care provider if you experience any of the following:  severe uncontrolled pain     Notify your health care provider if you experience any of the following:  redness, tenderness, or signs of infection (pain, swelling, redness, odor or green/yellow discharge around incision site)     Notify your health care provider if you experience any of the  following:  persistent dizziness, light-headedness, or visual disturbances     Notify your health care provider if you experience any of the following:  increased confusion or weakness     Notify your health care provider if you experience any of the following:  difficulty breathing or increased cough     Change dressing (specify)   Order Comments: Wound care will come to do wound vac changes [facility]. You should take your pain prescription 1 hour before wound vac change.     Activity as tolerated     Medications:  Reconciled Home Medications:      Medication List      START taking these medications    ciprofloxacin HCl 500 MG tablet  Commonly known as: CIPRO  Take 1 tablet (500 mg total) by mouth once daily. for 10 days     metroNIDAZOLE 500 MG tablet  Commonly known as: FLAGYL  Take 1 tablet (500 mg total) by mouth every 12 (twelve) hours. for 10 days     oxyCODONE-acetaminophen 7.5-325 mg per tablet  Commonly known as: PERCOCET  Take 1 tablet by mouth every 6 (six) hours as needed for Pain.        CHANGE how you take these medications    * ondansetron 4 MG tablet  Commonly known as: ZOFRAN  Take 1 tablet (4 mg total) by mouth every 6 (six) hours as needed for Nausea.  What changed: when to take this     * ondansetron 4 MG tablet  Commonly known as: ZOFRAN  Take 1 tablet (4 mg total) by mouth every 6 (six) hours as needed.  What changed: You were already taking a medication with the same name, and this prescription was added. Make sure you understand how and when to take each.         * This list has 2 medication(s) that are the same as other medications prescribed for you. Read the directions carefully, and ask your doctor or other care provider to review them with you.            CONTINUE taking these medications    b complex vitamins capsule  Take 1 capsule by mouth once daily.     DULoxetine 60 MG capsule  Commonly known as: CYMBALTA  Take 2 capsules (120 mg total) by mouth once daily.     ELIQUIS 5 mg Tab  Generic  drug: apixaban  Take 1 tablet (5 mg total) by mouth 2 (two) times daily.     furosemide 20 MG tablet  Commonly known as: LASIX  Take 1 tablet (20 mg total) by mouth daily as needed (fluid).     tlckkoan-cyhx-ouv6-C-radha-bosw 750 mg-644 mg- 30 mg-1 mg Tab  Take 1 tablet by mouth once daily.     hydroCHLOROthiazide 50 MG tablet  Commonly known as: HYDRODIURIL  Please hold (do not take) until seen by Primary Care Physician     HYDROcodone-acetaminophen 5-325 mg per tablet  Commonly known as: NORCO  Take 1 tablet by mouth every 6 (six) hours as needed for Pain.     lisinopriL 40 MG tablet  Commonly known as: PRINIVIL,ZESTRIL  Please hold (do not take) until seen by Primary Care Physician     RALPH OMEGA-3 KRILL OIL ORAL     MELATONIN ORAL  Take 1-2 tablets by mouth nightly as needed (Sleep).     metoprolol succinate 50 MG 24 hr tablet  Commonly known as: TOPROL-XL  Take 1 tablet (50 mg total) by mouth 2 (two) times daily.     multivitamin per tablet  Commonly known as: THERAGRAN  Take 1 tablet by mouth once daily.     omeprazole 20 MG capsule  Commonly known as: PRILOSEC  TAKE 1 CAPSULE BY MOUTH ONCE DAILY     traMADoL 50 mg tablet  Commonly known as: ULTRAM  Take 1 tablet (50 mg total) by mouth every 8 (eight) hours as needed for Pain.     traZODone 100 MG tablet  Commonly known as: DESYREL  Take 1 tablet (100 mg total) by mouth nightly as needed for Insomnia.        ASK your doctor about these medications    cephALEXin 500 MG capsule  Commonly known as: KEFLEX  Take 1 capsule (500 mg total) by mouth 4 (four) times daily. for 7 days  Ask about: Should I take this medication?            Eladia Curiel MD  Vascular Surgery  [facility]

## 2020-08-10 NOTE — HOSPITAL COURSE
Admitted Sunday 8/2/2020 for wound dehiscence. She was taken to OR 8/3/2020 for wound exploration, washout, and wound vac placement. Wound cultures were also obtained.   She was monitored on the floor until planned take-back to the OR 8/6/2020 for additional washout and wound vac change in conjunction with plastic surgery who performed deep layer closure.    She was discharged Friday 8/7/2020 with  orders for MWF wound vac changes and home ABX for positive wound cultures.

## 2020-08-10 NOTE — PATIENT INSTRUCTIONS
Discharge Instructions: Caring for Your Incision  You are going home with stitches (sutures), surgical staples, special strips of surgical tape called Steri-Strips, or surgical skin glue. One of these items was used to close your incision, help stop bleeding, and speed healing. Follow the tips on this sheet to help your incision heal.  Home care  · Always wash your hands before touching your incision.  · Keep your incision clean and dry.  · Avoid doing things that could cause dirt or sweat to get on your incision.  · Dont pick at scabs. They help protect the wound.  · Keep your incision out of water.  · Take a sponge bath to avoid getting your incision wet, unless your healthcare provider tells you otherwise.  · Ask your provider when can you take a shower or bathe.  · Ask your provider about the best way to keep your incision dry when bathing or showering.  · Pat sutures dry if they get wet. Dont rub.  · Leave the dressing (bandage) in place until you are told to remove it or change it. Change it only as directed, using clean hands.  · After the first 12 hours, change your dressing every 24 hours, or as directed by your healthcare provider.  · Change your dressing if it gets wet or soiled.  Care for specific closures  Follow these guidelines unless your child's healthcare provider tells you otherwise:  · Sutures or staples. Once you no longer need to keep these dry, clean the incision or wound daily. First remove the bandage using clean hands. Then wash the area gently with soap and warm water. Use a wet cotton swab to loosen and remove any blood or crust that forms. After cleaning, put a thin layer of antibiotic ointment on. Then put on a new bandage.  · Skin glue. Dont put liquid, ointment, or cream on your incision or wound while the glue is in place. Avoid activities that cause heavy sweating. Protect the incision or wound from sunlight. Do not scratch, rub, or pick at the glue. Do not put tape directly  over the glue. The glue should peel off within 5 to 10 days.  · Surgical tape. Keep your incision or wound dry. If it gets wet, blot the area dry with a clean towel. Surgical tape usually falls off within 7 to 10 days. If it has not fallen off after 10 days, contact your healthcare provider before taking it off yourself. If you are told to remove the tape, put mineral oil or petroleum jelly on a cotton ball. Gently rub the tape until it is removed.  Follow-up care  Follow up with your healthcare provider to ask how long sutures or staples should be left in place. Be sure to return for suture or staple removal as directed. If dissolving stitches were used in your mouth, these will not need to be removed. They should fall out or dissolve on their own.  If tape closures were used, remove them yourself when your provider recommends if they have not fallen off on their own. If skin glue was used, the glue will wear off by itself.      When to seek medical care  Call your healthcare provider right away if you have any of the following:  · More pain, redness, swelling, bleeding, or foul-smelling discharge around the incision area  · Fever of 100.4°F (38°C) or higher, or as directed by your healthcare provider  · Shaking chills  · Vomiting or nausea that doesnt go away  · Numbness, coldness, or tingling around the incision area, or changes in skin color  · Opening of the sutures or wound  · Stitches or staples come apart or fall out or surgical tape falls off before 7 days, or as directed by your provider     © 5062-8282 [employer]. [address] [place] [address], [place] [address]. All rights reserved. This information is not intended as a substitute for professional medical care. Always follow your healthcare professional's instructions.

## 2020-08-10 NOTE — HPI
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   She had undergone a recent ablation for atrial fibrillation.     This high risk left femoral incision was closed primarily with 2 large drains  And a Preventa VAC was placed.  One drain was removed prior to her discharge postoperative day 3.     On Tuesday 7/28/20 she returned to the vascular office:  She has been having adequate pain control with tramadol.   She reports that the existing drain has put out over 300-500 cc per day over the weekend, Down to 100-200 cc per day which appears mostly serous.     Today, 8/2/20, she presented to the ER for evaluation for wound dehiscence. She reported higher drainage from the groin over the past several days. She was seen by her PCP 7/31/20, who felt the wound looked OK, but started her on Keflex as a precaution. Over the weekend, the wound began to drain more, and partially dehisced.

## 2020-08-12 ENCOUNTER — DOCUMENT SCAN (OUTPATIENT)
Dept: HOME HEALTH SERVICES | Facility: HOSPITAL | Age: 55
End: 2020-08-12
Payer: COMMERCIAL

## 2020-08-14 ENCOUNTER — OFFICE VISIT (OUTPATIENT)
Dept: VASCULAR SURGERY | Facility: CLINIC | Age: 55
End: 2020-08-14
Payer: COMMERCIAL

## 2020-08-14 VITALS
HEART RATE: 65 BPM | WEIGHT: 293 LBS | SYSTOLIC BLOOD PRESSURE: 137 MMHG | BODY MASS INDEX: 47.09 KG/M2 | HEIGHT: 66 IN | TEMPERATURE: 99 F | DIASTOLIC BLOOD PRESSURE: 74 MMHG

## 2020-08-14 DIAGNOSIS — T81.30XA WOUND DEHISCENCE: Primary | ICD-10-CM

## 2020-08-14 PROCEDURE — 99999 PR PBB SHADOW E&M-EST. PATIENT-LVL V: CPT | Mod: PBBFAC,,, | Performed by: SURGERY

## 2020-08-14 PROCEDURE — 99024 POSTOP FOLLOW-UP VISIT: CPT | Mod: S$GLB,,, | Performed by: SURGERY

## 2020-08-14 PROCEDURE — 99999 PR PBB SHADOW E&M-EST. PATIENT-LVL V: ICD-10-PCS | Mod: PBBFAC,,, | Performed by: SURGERY

## 2020-08-14 PROCEDURE — 99024 PR POST-OP FOLLOW-UP VISIT: ICD-10-PCS | Mod: S$GLB,,, | Performed by: SURGERY

## 2020-08-14 RX ORDER — OXYCODONE AND ACETAMINOPHEN 7.5; 325 MG/1; MG/1
1 TABLET ORAL EVERY 8 HOURS PRN
Qty: 50 TABLET | Refills: 0 | Status: SHIPPED | OUTPATIENT
Start: 2020-08-14 | End: 2020-08-28

## 2020-08-14 NOTE — PROGRESS NOTES
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   She had undergone a recent ablation for atrial fibrillation.    This high risk left femoral incision was closed primarily with 2 large drains  And a Preventa VAC placed.  One drain was removed prior to her discharge postoperative day 3.    8/14/2020:  Despite her left femoral incision looking perfect that initial follow-up on postoperative 7, she presented to the ED 5 days later with wound dehiscence, requiring operative washout on 08/03/2020, and operative wound VAC change and partial deep closure of the wound (by Dr. Hamilton, plastic surgery) 8/6/2020.  This is her initial follow-up appointment.  The VAC changes have been painful, otherwise no complaints    Past Medical History:   Diagnosis Date    Atrial fibrillation     cardioversion    Avascular necrosis     L hand    Depression     GERD (gastroesophageal reflux disease)     Hx of psychiatric care     Hypertension     Psychiatric problem        Past Surgical History:   Procedure Laterality Date    ABLATION OF ARRHYTHMOGENIC FOCUS FOR ATRIAL FIBRILLATION N/A 7/20/2020    Procedure: Ablation atrial fibrillation;  Surgeon: Gabriel Hawley MD;  Location: [facility] EP LAB;  Service: Cardiology;  Laterality: N/A;  afib, PVI, RFA, REENA, SHADY, anes, MB, 3 Prep    APPLICATION OF WOUND VACUUM-ASSISTED CLOSURE DEVICE Left 8/3/2020    Procedure: APPLICATION, WOUND VAC;  Surgeon: SEMAJ Márquez III, MD;  Location: [facility] OR [address] [place] [address];  Service: Peripheral Vascular;  Laterality: Left;    APPLICATION OF WOUND VACUUM-ASSISTED CLOSURE DEVICE Left 8/6/2020    Procedure: APPLICATION, WOUND VAC;  Surgeon: SEMAJ Márquez III, MD;  Location: [facility] OR [address] [place] [address];  Service: Peripheral Vascular;  Laterality: Left;    CARPAL TUNNEL RELEASE Right 6/10/2020    Procedure: RELEASE,  CARPAL TUNNEL - RIGHT;  Surgeon: Adelaida Hall MD;  Location: [facility] OR;  Service: Orthopedics;  Laterality: Right;  GENERAL AND REGIONAL    CLOSURE OF WOUND Left 8/6/2020    Procedure: CLOSURE, WOUND;  Surgeon: SEMAJ Márquez III, MD;  Location: [facility] OR 2ND FLR;  Service: Peripheral Vascular;  Laterality: Left;  Complex    EXPLORATION OF FEMORAL ARTERY Left 7/21/2020    Procedure: EXPLORATION, ARTERY, FEMORAL;  Surgeon: SEMAJ Márquez III, MD;  Location: [facility] OR [facility] FLR;  Service: Peripheral Vascular;  Laterality: Left;  1. Urgent Direct repair, L SFA branch laceration    FOOT SURGERY      TREATMENT OF CARDIAC ARRHYTHMIA N/A 1/29/2020    Procedure: CARDIOVERSION;  Surgeon: Gabriel Hawley MD;  Location: [facility] EP LAB;  Service: Cardiology;  Laterality: N/A;  af, demi, dccv, anes, mb, 345    WOUND DEBRIDEMENT Left 8/6/2020    Procedure: DEBRIDEMENT, WOUND;  Surgeon: SEMAJ Márquez III, MD;  [facility];  Service: Peripheral Vascular;  Laterality: Left;         Current Outpatient Medications:     apixaban (ELIQUIS) 5 mg Tab, Take 1 tablet (5 mg total) by mouth 2 (two) times daily., Disp: 60 tablet, Rfl: 6    b complex vitamins capsule, Take 1 capsule by mouth once daily., Disp: , Rfl:     ciprofloxacin HCl (CIPRO) 500 MG tablet, Take 1 tablet (500 mg total) by mouth once daily. for 10 days, Disp: 10 tablet, Rfl: 0    DULoxetine (CYMBALTA) 60 MG capsule, Take 2 capsules (120 mg total) by mouth once daily., Disp: 180 capsule, Rfl: 3    furosemide (LASIX) 20 MG tablet, Take 1 tablet (20 mg total) by mouth daily as needed (fluid)., Disp: 30 tablet, Rfl: 6    gluc-shikha-msm#0-K-xlle-reymundo-bor 750 mg-644 mg- 30 mg-1 mg Tab, Take 1 tablet by mouth once daily. , Disp: , Rfl:     hydroCHLOROthiazide (HYDRODIURIL) 50 MG tablet, Please hold (do not take) until seen by Primary Care Physician, Disp: 90 tablet, Rfl: 5    HYDROcodone-acetaminophen (NORCO) 5-325 mg per tablet, Take 1 tablet by mouth  every 6 (six) hours as needed for Pain., Disp: 20 tablet, Rfl: 0    krill/om-3/dha/epa/phospho/ast (MEGARED OMEGA-3 KRILL OIL ORAL), , Disp: , Rfl:     lisinopriL (PRINIVIL,ZESTRIL) 40 MG tablet, Please hold (do not take) until seen by Primary Care Physician, Disp: 90 tablet, Rfl: 5    MELATONIN ORAL, Take 1-2 tablets by mouth nightly as needed (Sleep)., Disp: , Rfl:     metoprolol succinate (TOPROL-XL) 50 MG 24 hr tablet, Take 1 tablet (50 mg total) by mouth 2 (two) times daily., Disp: 90 tablet, Rfl: 6    metroNIDAZOLE (FLAGYL) 500 MG tablet, Take 1 tablet (500 mg total) by mouth every 12 (twelve) hours. for 10 days, Disp: 20 tablet, Rfl: 0    multivitamin (THERAGRAN) per tablet, Take 1 tablet by mouth once daily., Disp: , Rfl:     omeprazole (PRILOSEC) 20 MG capsule, TAKE 1 CAPSULE BY MOUTH ONCE DAILY, Disp: 90 capsule, Rfl: 3    ondansetron (ZOFRAN) 4 MG tablet, Take 1 tablet (4 mg total) by mouth every 6 (six) hours as needed for Nausea., Disp: 20 tablet, Rfl: 0    ondansetron (ZOFRAN) 4 MG tablet, Take 1 tablet (4 mg total) by mouth every 6 (six) hours as needed., Disp: 20 tablet, Rfl: 0    oxyCODONE-acetaminophen (PERCOCET) 7.5-325 mg per tablet, Take 1 tablet by mouth every 8 (eight) hours as needed for Pain. You may take 1 additional tablet the morning of wound vac changes, Disp: 50 tablet, Rfl: 0    traMADoL (ULTRAM) 50 mg tablet, Take 1 tablet (50 mg total) by mouth every 8 (eight) hours as needed for Pain. (Patient not taking: Reported on 8/10/2020), Disp: 15 tablet, Rfl: 0    traZODone (DESYREL) 100 MG tablet, Take 1 tablet (100 mg total) by mouth nightly as needed for Insomnia., Disp: 90 tablet, Rfl: 3  No current facility-administered medications for this visit.     Facility-Administered Medications Ordered in Other Visits:     0.9%  NaCl infusion, , Intravenous, Continuous, Derrick Dior MD, Last Rate: 0 mL/hr at 07/20/20 8921    Review of patient's allergies indicates:  No  "Known Allergies    Family History   Problem Relation Age of Onset    Cataracts Mother     Hypertension Mother     Thyroid disease Mother     Diabetes Father     Hypertension Father     Hypertension Sister     Hypertension Brother     Amblyopia Neg Hx     Blindness Neg Hx     Cancer Neg Hx     Glaucoma Neg Hx     Macular degeneration Neg Hx     Retinal detachment Neg Hx     Strabismus Neg Hx     Stroke Neg Hx        Social History     Tobacco Use    Smoking status: Former Smoker     Types: Cigarettes     Quit date: 2019     Years since quittin.1    Smokeless tobacco: Never Used   Substance Use Topics    Alcohol use: No    Drug use: No       REVIEW OF SYSTEMS:  General: No chills, fever, malaise, changes in weight  HEENT: No visual changes, difficulty hearing  Cardiovascular: No chest pain, palpitations, claudication  Pulmonary: No dyspnea, cough, wheezing  Gastrointestinal: No nausea, vomiting, diarrhea, constipation  Genitourinary: No dysuria, low urine output, hematuria  Endocrine: No polydipsia, polyphagia  Hematologic: No fatigue with exertion, pica, pallor  Musculoskeletal: No extremity or joint pain, no back pain, no difficulty with ambulation  Neurologic: no seizures, no headaches, no weakness  Psychiatric: no mood disturbance  3    PHYSICAL EXAM:   /74 (BP Location: Left arm, Patient Position: Sitting, BP Method: Medium (Automatic))   Pulse 65   Temp 99.3 °F (37.4 °C) (Oral)   Ht 5' 6" (1.676 m)   Wt 135 kg (297 lb 9.9 oz)   LMP 2015   BMI 48.04 kg/m²   Constitutional:  Alert,   Well-appearing  In no distress.   Neurological: Normal speech  no focal findings  CN II - XII grossly intact.    Psychiatric: Mood and affect appropriate and symmetric.   HEENT: Normocephalic / atraumatic  PERRLA  Midline trachea  No scars across the neck   Cardiac: Regular rate and rhythm.   Pulmonary: Normal pulmonary effort.   Abdomen: Soft, not distended.     Skin: Warm and well " perfused.    Vascular:  The peak well perfused   Extremities/  Musculoskeletal: No edema.   Left leg demonstrates a VAC in place.  This was taken down showing excellent granulation tissue, no significant erythema or drainage     IMAGING:  None    IMPRESSION:   Status post left femoral wound dehiscence after exploration for expanding hematoma as above.  She showing excellent early granulation tissue and healing with a wound VAC    PLAN:     1.   Continue with wound VAC and 3 times a week changes with home health  2.  Mary Theroit/wound care to follow until healed.  She also saw the patient with me today  3.  Refill a 7.5 mg Norco for pain, particularly prior to wound VAC changes    GUIDO Márquez III, MD, FACS  Professor and Chief, Vascular and Endovascular Surgery     Cc: GENTRY Hawley

## 2020-08-18 ENCOUNTER — TELEPHONE (OUTPATIENT)
Dept: WOUND CARE | Facility: CLINIC | Age: 55
End: 2020-08-18

## 2020-08-18 NOTE — TELEPHONE ENCOUNTER
Called no answer, left voice message asking patient to return call at [phone] to discuss scheduling appointment with Mary Her in Wound Care - referral from Dr. Márquez service

## 2020-08-18 NOTE — TELEPHONE ENCOUNTER
----- Message from Ngoc Snow RN sent at 8/14/2020 10:07 AM CDT -----  Arpita,    Mrs. Christiano needs to FU w/ Mary for her left groin wound (w/ wound vac.) Mary also asked me to contact her [employer] company so that they can order white foam for her dressing changes. I'll handle that today. She said she can get transportation on Wednesdays and Fridays, but afternoons are easier.    Ngoc

## 2020-08-19 ENCOUNTER — DOCUMENT SCAN (OUTPATIENT)
Dept: HOME HEALTH SERVICES | Facility: HOSPITAL | Age: 55
End: 2020-08-19
Payer: COMMERCIAL

## 2020-08-20 ENCOUNTER — TELEPHONE (OUTPATIENT)
Dept: VASCULAR SURGERY | Facility: CLINIC | Age: 55
End: 2020-08-20

## 2020-08-20 NOTE — TELEPHONE ENCOUNTER
Contacted Addie in response to message. Notified Addie that nurse does not see wound measurements documented anywhere in chart, however pt has visit scheduled with Mary Her on 08/28/20 and there will certainly be wound measurements in the chart for this visit. Asked Addie if order for white foam was received and she states it was not. States she will order white foam for patient and that in order for it to be approved pt will need a medical necessity form completed and returned to [facility]. Asked that form be faxed to [phone]. Will have form completed and returned ASAP. Attempted to contact patient to discuss need for wound measurements from  nurse. Unable to reach patient by telephone or leave voice message.----- Message from Hodan Rice sent at 8/20/2020 11:17 AM CDT -----  Regarding: GIBSON Ariadna  Need pt wound measurements    Contact info [phone]

## 2020-08-25 NOTE — OP NOTE
Date of surgery 08/06/2020  Preoperative diagnosis is left groin wound  Postoperative diagnosis is the same  Procedure performed  1.  Irrigation debridement of left groin wound  2.  Partial closure of wound  3.  Placement of wound VAC    After completion of the vascular portion the procedure entered the room.  Patient had a deep groin wound.  The wound was debrided of all nonviable skin and subcutaneous tissue circumferentially around the wound which was approximately at 10 cm wound.  Next, the granulation tissue was scraped medially and laterally in the wound was closed down as much as possible.  It was not possible to close the entire wound.  A wound VAC was then applied there were no complications

## 2020-08-28 ENCOUNTER — TELEPHONE (OUTPATIENT)
Dept: VASCULAR SURGERY | Facility: CLINIC | Age: 55
End: 2020-08-28

## 2020-08-28 ENCOUNTER — OFFICE VISIT (OUTPATIENT)
Dept: WOUND CARE | Facility: CLINIC | Age: 55
End: 2020-08-28
Payer: COMMERCIAL

## 2020-08-28 ENCOUNTER — DOCUMENTATION ONLY (OUTPATIENT)
Dept: VASCULAR SURGERY | Facility: CLINIC | Age: 55
End: 2020-08-28

## 2020-08-28 VITALS
BODY MASS INDEX: 47.09 KG/M2 | HEART RATE: 79 BPM | SYSTOLIC BLOOD PRESSURE: 129 MMHG | HEIGHT: 66 IN | DIASTOLIC BLOOD PRESSURE: 80 MMHG | WEIGHT: 293 LBS | TEMPERATURE: 97 F

## 2020-08-28 DIAGNOSIS — T81.30XA WOUND DEHISCENCE: Primary | ICD-10-CM

## 2020-08-28 PROCEDURE — 99999 PR PBB SHADOW E&M-EST. PATIENT-LVL V: ICD-10-PCS | Mod: PBBFAC,,, | Performed by: NURSE PRACTITIONER

## 2020-08-28 PROCEDURE — 97605 PR NEG PRESS WOUND THERAPY (NPWT) W/NON-DISPOSABLE WOUND VAC DEVICE (DME), <=50 CM: ICD-10-PCS | Mod: S$GLB,,, | Performed by: NURSE PRACTITIONER

## 2020-08-28 PROCEDURE — 99024 PR POST-OP FOLLOW-UP VISIT: ICD-10-PCS | Mod: S$GLB,,, | Performed by: NURSE PRACTITIONER

## 2020-08-28 PROCEDURE — 99024 POSTOP FOLLOW-UP VISIT: CPT | Mod: S$GLB,,, | Performed by: NURSE PRACTITIONER

## 2020-08-28 PROCEDURE — 99999 PR PBB SHADOW E&M-EST. PATIENT-LVL V: CPT | Mod: PBBFAC,,, | Performed by: NURSE PRACTITIONER

## 2020-08-28 PROCEDURE — 97605 NEG PRS WND THER DME<=50SQCM: CPT | Mod: S$GLB,,, | Performed by: NURSE PRACTITIONER

## 2020-08-28 RX ORDER — OXYCODONE AND ACETAMINOPHEN 7.5; 325 MG/1; MG/1
1 TABLET ORAL EVERY 8 HOURS PRN
Qty: 50 TABLET | Refills: 0 | Status: SHIPPED | OUTPATIENT
Start: 2020-08-28 | End: 2020-09-14 | Stop reason: SDUPTHER

## 2020-08-28 RX ORDER — HYDROCODONE BITARTRATE AND ACETAMINOPHEN 5; 325 MG/1; MG/1
1 TABLET ORAL EVERY 6 HOURS PRN
Qty: 30 TABLET | Refills: 0 | Status: SHIPPED | OUTPATIENT
Start: 2020-08-28 | End: 2020-11-20 | Stop reason: SDUPTHER

## 2020-08-28 NOTE — PROGRESS NOTES
Subjective:       Patient ID: Vicky Alvarado is a 55 y.o. female.    Chief Complaint: Wound Check    Wound Check    This is a 55 year old female referred by Dr. Márquez for evaluation and management of a surgical wound to the left groin.  She underwent an urgent repair of the left SFA branch for a laceration on 7/21/20 with Dr. Márquez.  She then had a wound vac placed when the wound dehisced on 8/3/20 with Dr. Márquez. She developed a hematoma and underwent a left groin washout and application of wound vac on 8/6/20 with Dr. Márquez.  She is seen today for evaluation and management of this wound.  She is afebrile. She denies increased redness, swelling or purulent drainage.  Her pain level is 4/10.  She has home health services through [facility].    Patient Active Problem List   Diagnosis    Opiate dependence    Opioid dependence in remission    Hypertension    Chronic diastolic heart failure    Atrial Fibrillation (paroxysmal)    Cardiomyopathy    Avascular necrosis of lunate    Body mass index (BMI) 45.0-49.9, adult    History of opioid abuse    Severe obesity (BMI >= 40)    Pre-diabetes    Right carpal tunnel syndrome    Weakness of right hand    ERENDIRA (obstructive sleep apnea)    Atrial fibrillation    Hematoma of groin    Hematoma    Depression    Wound dehiscence     Past Medical History:   Diagnosis Date    Atrial fibrillation     cardioversion    Avascular necrosis     L hand    Depression     GERD (gastroesophageal reflux disease)     Hx of psychiatric care     Hypertension     Psychiatric problem      Past Surgical History:   Procedure Laterality Date    ABLATION OF ARRHYTHMOGENIC FOCUS FOR ATRIAL FIBRILLATION N/A 7/20/2020    Procedure: Ablation atrial fibrillation;  Surgeon: Gabriel Hawley MD;  Location: [facility] EP LAB;  Service: Cardiology;  Laterality: N/A;  afib, PVI, RFA, REENA, SHADY, anes, MB, 3 Prep    APPLICATION OF WOUND VACUUM-ASSISTED CLOSURE DEVICE  Left 8/3/2020    Procedure: APPLICATION, WOUND VAC;  Surgeon: SEMAJ Márquez III, MD;  Location: [facility] OR [facility] FLR;  Service: Peripheral Vascular;  Laterality: Left;    APPLICATION OF WOUND VACUUM-ASSISTED CLOSURE DEVICE Left 8/6/2020    Procedure: APPLICATION, WOUND VAC;  Surgeon: SEMAJ Márquez III, MD;  [facility];  Service: Peripheral Vascular;  Laterality: Left;    CARPAL TUNNEL RELEASE Right 6/10/2020    Procedure: RELEASE, CARPAL TUNNEL - RIGHT;  Surgeon: Adelaida Hall MD;  Location: [facility] OR;  Service: Orthopedics;  Laterality: Right;  GENERAL AND REGIONAL    CLOSURE OF WOUND Left 8/6/2020    Procedure: CLOSURE, WOUND;  Surgeon: SEMAJ Márquez III, MD;  [facility];  Service: Peripheral Vascular;  Laterality: Left;  Complex    EXPLORATION OF FEMORAL ARTERY Left 7/21/2020    Procedure: EXPLORATION, ARTERY, FEMORAL;  Surgeon: SEMAJ Márquez III, MD;  Location: [facility] OR [address] [place] [address];  Service: Peripheral Vascular;  Laterality: Left;  1. Urgent Direct repair, L SFA branch laceration    FOOT SURGERY      TREATMENT OF CARDIAC ARRHYTHMIA N/A 1/29/2020    Procedure: CARDIOVERSION;  Surgeon: Gabriel Hawley MD;  Location: [facility] EP LAB;  Service: Cardiology;  Laterality: N/A;  af, demi, dccv, anes, mb, 345    WOUND DEBRIDEMENT Left 8/6/2020    Procedure: DEBRIDEMENT, WOUND;  Surgeon: SEMAJ Márquez III, MD;  Location: [address];  Service: Peripheral Vascular;  Laterality: Left;     Review of Systems   Constitutional: Negative for chills, diaphoresis and fever.   HENT: Negative for hearing loss, postnasal drip, rhinorrhea, sinus pressure, sneezing, sore throat, tinnitus and trouble swallowing.    Eyes: Negative for visual disturbance.   Respiratory: Positive for apnea (on CPAP). Negative for cough, shortness of breath and wheezing.    Cardiovascular: Negative for chest pain, palpitations and leg swelling.   Gastrointestinal: Positive for constipation. Negative for  diarrhea, nausea and vomiting.   Genitourinary: Negative for difficulty urinating, dysuria, frequency and hematuria.   Musculoskeletal: Negative for arthralgias, back pain and joint swelling.   Skin: Positive for wound.   Neurological: Negative for dizziness, weakness, light-headedness and headaches.   Hematological: Bruises/bleeds easily.   Psychiatric/Behavioral: Positive for dysphoric mood and sleep disturbance. Negative for confusion and decreased concentration. The patient is nervous/anxious.        Objective:      Physical Exam  Vitals signs and nursing note reviewed.   Constitutional:       General: She is not in acute distress.     Appearance: She is well-developed. She is not diaphoretic.   HENT:      Head: Normocephalic and atraumatic.   Pulmonary:      Effort: Pulmonary effort is normal. No respiratory distress.   Musculoskeletal:         General: No tenderness.        Legs:    Skin:     General: Skin is warm and dry.      Findings: No erythema or rash.      Nails: There is no clubbing.     Neurological:      Mental Status: She is alert and oriented to person, place, and time.   Psychiatric:         Behavior: Behavior normal.         Thought Content: Thought content normal.         Judgment: Judgment normal.         Left groin      The drape and black sponge were removed from the wound bed.  The wound was cleansed with wound .  Skin prep was applied to the periwound are.  White sponge was placed in the base of the wound and covered with black foam.  A piece of drape was used to seal the sponges to the wound.  The trac pad was connected to the black sponge and then to the wound vac.  The vac was run on continuous suction at 150 mmHg.  The patient tolerated the procedure well.  Assessment:       1. Wound dehiscence               Plan:            Cleanse wound with wound cleanser.  Place white sponge in the base of the wound and cover with black sponge.  Run vac at 150 mmHg on continuous  suction.  Change dressing three times weekly.  Written and verbal instructions given to patient.  [facility] notified of orders via email  We will ask them to see the patient three times weekly.  Return to clinic in one month.

## 2020-08-28 NOTE — TELEPHONE ENCOUNTER
Contacted patient's family member Zully to notify her that Percocet prescription for patient is ready for pickup at [facility]. Zully verbalized understanding.

## 2020-09-01 ENCOUNTER — DOCUMENT SCAN (OUTPATIENT)
Dept: HOME HEALTH SERVICES | Facility: HOSPITAL | Age: 55
End: 2020-09-01
Payer: COMMERCIAL

## 2020-09-03 LAB — FUNGUS SPEC CULT: NORMAL

## 2020-09-08 ENCOUNTER — TELEPHONE (OUTPATIENT)
Dept: SLEEP MEDICINE | Facility: CLINIC | Age: 55
End: 2020-09-08

## 2020-09-08 ENCOUNTER — TELEPHONE (OUTPATIENT)
Dept: WOUND CARE | Facility: CLINIC | Age: 55
End: 2020-09-08

## 2020-09-08 NOTE — TELEPHONE ENCOUNTER
----- Message from Phoebe Valencia sent at 9/8/2020  2:32 PM CDT -----  Regarding: Ms Samson with the clinic dept for [employer]  Patient Advice:    Ms Samson with [employer] called to speak to the nurse regarding questions concerning the pt's wound increase in size and would like a call back today.    Ms Samson can be reached at [phone]

## 2020-09-08 NOTE — TELEPHONE ENCOUNTER
Appointment For: Vicky Alvarado ([id number])  Visit Type: EP - MYCHART VIRTUAL VISIT (608)    10/7/2020    2:30 PM  30 mins.  Gunjan Doty MD      [facility]    Patient Comments:  My sleep apnea

## 2020-09-08 NOTE — TELEPHONE ENCOUNTER
Appointment canceled for Vicky Rachel Christiano ([id number])  Visit Type: EST PATIENT - VIRTUAL (DT)  Date        Time      Length    Provider                  Department  9/8/2020     2:30 PM  30 mins.  Gunjan Doty MD      [facility]    Reason for Cancellation: Other

## 2020-09-08 NOTE — TELEPHONE ENCOUNTER
Returned call and spoke with Chapin from [facility] who states she needs to know why the wound has increased in size from [id number] on 8/3/2020 to [id number] on 8/28/2020.  Advised Chapin that provider visit on 8/28/2020 was initial visit to the wound care clinic service.  Representative needs to know if there was some reason for the increase.  Wound was debrided on 8/6/2020 and also dehisced shortly thereafter around 8/14/2020.

## 2020-09-24 PROCEDURE — G0179 MD RECERTIFICATION HHA PT: HCPCS | Mod: ,,, | Performed by: INTERNAL MEDICINE

## 2020-09-24 PROCEDURE — G0179 PR HOME HEALTH MD RECERTIFICATION: ICD-10-PCS | Mod: ,,, | Performed by: INTERNAL MEDICINE

## 2020-09-25 ENCOUNTER — TELEPHONE (OUTPATIENT)
Dept: WOUND CARE | Facility: CLINIC | Age: 55
End: 2020-09-25

## 2020-09-25 ENCOUNTER — OFFICE VISIT (OUTPATIENT)
Dept: WOUND CARE | Facility: CLINIC | Age: 55
End: 2020-09-25
Payer: COMMERCIAL

## 2020-09-25 VITALS
TEMPERATURE: 97 F | HEART RATE: 93 BPM | WEIGHT: 289.25 LBS | HEIGHT: 66 IN | DIASTOLIC BLOOD PRESSURE: 88 MMHG | BODY MASS INDEX: 46.49 KG/M2 | SYSTOLIC BLOOD PRESSURE: 131 MMHG

## 2020-09-25 DIAGNOSIS — T81.30XA WOUND DEHISCENCE: Primary | ICD-10-CM

## 2020-09-25 DIAGNOSIS — T81.31XA DEHISCENCE OF OPERATIVE WOUND, INITIAL ENCOUNTER: Primary | ICD-10-CM

## 2020-09-25 PROBLEM — S30.1XXA HEMATOMA OF GROIN: Status: RESOLVED | Noted: 2020-07-21 | Resolved: 2020-09-25

## 2020-09-25 PROBLEM — T14.8XXA HEMATOMA: Status: RESOLVED | Noted: 2020-07-21 | Resolved: 2020-09-25

## 2020-09-25 PROCEDURE — 99024 PR POST-OP FOLLOW-UP VISIT: ICD-10-PCS | Mod: S$GLB,,, | Performed by: NURSE PRACTITIONER

## 2020-09-25 PROCEDURE — 99999 PR PBB SHADOW E&M-EST. PATIENT-LVL V: CPT | Mod: PBBFAC,,, | Performed by: NURSE PRACTITIONER

## 2020-09-25 PROCEDURE — 99024 POSTOP FOLLOW-UP VISIT: CPT | Mod: S$GLB,,, | Performed by: NURSE PRACTITIONER

## 2020-09-25 PROCEDURE — 99999 PR PBB SHADOW E&M-EST. PATIENT-LVL V: ICD-10-PCS | Mod: PBBFAC,,, | Performed by: NURSE PRACTITIONER

## 2020-09-25 RX ORDER — NEOMYCIN/BACITRACIN/POLYMYXINB 3.5-400-5K
5 OINTMENT (GRAM) TOPICAL
COMMUNITY
End: 2021-01-29

## 2020-09-25 NOTE — PATIENT INSTRUCTIONS
Keep your dressing dry.  Keep the primary dressing in place and change three times weekly.  You may change the secondary dressing as needed if soiled.  On the day home health is coming to change your dressing, you may shower with a mild soap such as Dove.  Irrigate the wound with lukewarm water for 5 minutes, then dry thoroughly.  Place a dry bandage over the wound to cover it until the nurse arrives.

## 2020-09-25 NOTE — TELEPHONE ENCOUNTER
----- Message from SEMAJ Márquez III, MD sent at 9/25/2020  1:52 PM CDT -----  How long do you think it will take to completely epithelialize ?   If its more than 6-8 weeks, a STSG could speed the process.   If you think it will be healed in <4 weeks, probably not worth it  ----- Message -----  From: Marleni Her NP  Sent: 9/25/2020   1:44 PM CDT  To: SEMAJ Márquez III, MD    I stopped the vac and I don't think she may need a skin graft.  It is healing so I ordered aquacel.  She already has epithelial tissue at the edges.  If you want her to see Alfonso I am happy to put in the referral.    Mary  ----- Message -----  From: SEMAJ Márquez III, MD  Sent: 9/25/2020   1:26 PM CDT  To: Marleni Her NP    Thanks.   Poor lady, feel terrible for her.   Is it superficial enough now to consider a STSG ?   If so, please refer to Dr Hamilton  ----- Message -----  From: Marleni Her NP  Sent: 9/25/2020   1:15 PM CDT  To: SEMAJ Márquez III, MD

## 2020-09-25 NOTE — PROGRESS NOTES
Subjective:       Patient ID: Vicky Alvarado is a 55 y.o. female.    Chief Complaint: Wound Check    Wound Check  This is a 55 year old female referred by Dr. Márquez for evaluation and management of a surgical wound to the left groin.  She underwent an urgent repair of the left SFA branch for a laceration on 7/21/20 with Dr. Márquez.  She then had a wound vac placed when the wound dehisced on 8/3/20 with Dr. Márquez. She developed a hematoma and underwent a left groin washout and application of wound vac on 8/6/20 with Dr. Márquez.  She is seen today for reevaluation of this wound.  A wound vac is in place and the wound is healing as evidenced by wound contracture. She is afebrile. She denies increased redness, swelling or purulent drainage.  She does not complain of pain.  She has home health services through [facility].    Patient Active Problem List   Diagnosis    Opiate dependence    Opioid dependence in remission    Hypertension    Chronic diastolic heart failure    Atrial Fibrillation (paroxysmal)    Cardiomyopathy    Avascular necrosis of lunate    Body mass index (BMI) 45.0-49.9, adult    History of opioid abuse    Severe obesity (BMI >= 40)    Pre-diabetes    Right carpal tunnel syndrome    Weakness of right hand    ERENDIRA (obstructive sleep apnea)    Atrial fibrillation    Depression    Wound dehiscence     Past Medical History:   Diagnosis Date    Atrial fibrillation     cardioversion    Avascular necrosis     L hand    Depression     GERD (gastroesophageal reflux disease)     Hx of psychiatric care     Hypertension     Psychiatric problem      Past Surgical History:   Procedure Laterality Date    ABLATION OF ARRHYTHMOGENIC FOCUS FOR ATRIAL FIBRILLATION N/A 7/20/2020    Procedure: Ablation atrial fibrillation;  Surgeon: Gabriel Hawley MD;  Location: [facility] EP LAB;  Service: Cardiology;  Laterality: N/A;  afib, PVI, RFA, REENA, SHADY, anes, MB, 3 Prep     APPLICATION OF WOUND VACUUM-ASSISTED CLOSURE DEVICE Left 8/3/2020    Procedure: APPLICATION, WOUND VAC;  Surgeon: SEMAJ Márquez III, MD;  [facility];  Service: Peripheral Vascular;  Laterality: Left;    APPLICATION OF WOUND VACUUM-ASSISTED CLOSURE DEVICE Left 8/6/2020    Procedure: APPLICATION, WOUND VAC;  Surgeon: SEMAJ Márquez III, MD;  [facility];  Service: Peripheral Vascular;  Laterality: Left;    CARPAL TUNNEL RELEASE Right 6/10/2020    Procedure: RELEASE, CARPAL TUNNEL - RIGHT;  Surgeon: Adelaida Hall MD;  Location: [facility] OR;  Service: Orthopedics;  Laterality: Right;  GENERAL AND REGIONAL    CLOSURE OF WOUND Left 8/6/2020    Procedure: CLOSURE, WOUND;  Surgeon: SEMAJ Márquez III, MD;  Location: [facility] OR [address] [place] [address];  Service: Peripheral Vascular;  Laterality: Left;  Complex    EXPLORATION OF FEMORAL ARTERY Left 7/21/2020    Procedure: EXPLORATION, ARTERY, FEMORAL;  Surgeon: SEMAJ Márquez III, MD;  Location: [facility] OR [address] [place] [address];  Service: Peripheral Vascular;  Laterality: Left;  1. Urgent Direct repair, L SFA branch laceration    FOOT SURGERY      TREATMENT OF CARDIAC ARRHYTHMIA N/A 1/29/2020    Procedure: CARDIOVERSION;  Surgeon: Gabriel Hawley MD;  Location: [facility] EP LAB;  Service: Cardiology;  Laterality: N/A;  af, demi, dccv, anes, mb, 345    WOUND DEBRIDEMENT Left 8/6/2020    Procedure: DEBRIDEMENT, WOUND;  Surgeon: SEMAJ Márquez III, MD;  Location: [address];  Service: Peripheral Vascular;  Laterality: Left;     Review of Systems   Constitutional: Negative for chills, diaphoresis and fever.   HENT: Negative for hearing loss, postnasal drip, rhinorrhea, sinus pressure, sneezing, sore throat, tinnitus and trouble swallowing.    Eyes: Negative for visual disturbance.   Respiratory: Positive for apnea (on CPAP). Negative for cough, shortness of breath and wheezing.    Cardiovascular: Negative for chest pain, palpitations and leg swelling.    Gastrointestinal: Positive for constipation. Negative for diarrhea, nausea and vomiting.   Genitourinary: Negative for difficulty urinating, dysuria, frequency and hematuria.   Musculoskeletal: Negative for arthralgias, back pain and joint swelling.   Skin: Positive for wound.   Neurological: Negative for dizziness, weakness, light-headedness and headaches.   Hematological: Bruises/bleeds easily.   Psychiatric/Behavioral: Positive for dysphoric mood and sleep disturbance. Negative for confusion and decreased concentration. The patient is nervous/anxious.        Objective:      Physical Exam  Vitals signs and nursing note reviewed.   Constitutional:       General: She is not in acute distress.     Appearance: She is well-developed. She is not diaphoretic.   HENT:      Head: Normocephalic and atraumatic.   Pulmonary:      Effort: Pulmonary effort is normal. No respiratory distress.   Musculoskeletal:         General: No tenderness.        Legs:    Skin:     General: Skin is warm and dry.      Findings: No erythema or rash.      Nails: There is no clubbing.     Neurological:      Mental Status: She is alert and oriented to person, place, and time.   Psychiatric:         Behavior: Behavior normal.         Thought Content: Thought content normal.         Judgment: Judgment normal.         Left groin        Assessment:       1. Wound dehiscence               Plan:            Discontinue wound vac therapy.  Cleanse wound with wound cleanser.  Skin prep to periwound area.  Place hydrofiber in the wound base.  Cover with 4x4s and secure with a 6x6 island dressing.  Change primary dressing three times weekly and secondary dressing as needed.  Written and verbal instructions given to patient.  [facility] notified of orders via email  We will ask them to see the patient three times weekly.  Return to clinic in one month.

## 2020-09-28 ENCOUNTER — EXTERNAL HOME HEALTH (OUTPATIENT)
Dept: HOME HEALTH SERVICES | Facility: HOSPITAL | Age: 55
End: 2020-09-28
Payer: COMMERCIAL

## 2020-09-30 ENCOUNTER — DOCUMENT SCAN (OUTPATIENT)
Dept: HOME HEALTH SERVICES | Facility: HOSPITAL | Age: 55
End: 2020-09-30
Payer: COMMERCIAL

## 2020-09-30 ENCOUNTER — OFFICE VISIT (OUTPATIENT)
Dept: PLASTIC SURGERY | Facility: CLINIC | Age: 55
End: 2020-09-30
Payer: COMMERCIAL

## 2020-09-30 VITALS
HEIGHT: 66 IN | BODY MASS INDEX: 47.09 KG/M2 | HEART RATE: 95 BPM | DIASTOLIC BLOOD PRESSURE: 87 MMHG | SYSTOLIC BLOOD PRESSURE: 150 MMHG | WEIGHT: 293 LBS

## 2020-09-30 DIAGNOSIS — T81.31XD DEHISCENCE OF OPERATIVE WOUND, SUBSEQUENT ENCOUNTER: ICD-10-CM

## 2020-09-30 PROCEDURE — 99024 PR POST-OP FOLLOW-UP VISIT: ICD-10-PCS | Mod: S$GLB,,, | Performed by: SURGERY

## 2020-09-30 PROCEDURE — 99024 POSTOP FOLLOW-UP VISIT: CPT | Mod: S$GLB,,, | Performed by: SURGERY

## 2020-09-30 PROCEDURE — 99999 PR PBB SHADOW E&M-EST. PATIENT-LVL IV: ICD-10-PCS | Mod: PBBFAC,,, | Performed by: SURGERY

## 2020-09-30 PROCEDURE — 99999 PR PBB SHADOW E&M-EST. PATIENT-LVL IV: CPT | Mod: PBBFAC,,, | Performed by: SURGERY

## 2020-09-30 NOTE — PROGRESS NOTES
Plastic Surgery Consultation    Date of Consultation:   2020    Reason for Consultation:  Skin flap    History of Present Illness:  56 yo female s/p deep closure of left groin wound 2020.       Prior hx: pt required urgent left SFA branch laceration repair, evacuation 20 cm hematoma 2020 after  she presented with left groin hematoma s/p ablation for atrial fibrillation. Her left femoral incision was closed primarily with 2 large drains and a Preventa VAC placed.  One drain was removed prior to her discharge postoperative day 3. She presented to the ED POD 13  with wound dehiscence, requiring operative washout on 2020, and operative wound VAC change and partial deep closure of the wound by plastic surgery 2020 with subsequent wound vac placement.     Past Medical History:    has a past medical history of Atrial fibrillation, Avascular necrosis, Depression, GERD (gastroesophageal reflux disease), psychiatric care, Hypertension, and Psychiatric problem.    Past Surgical History:    has a past surgical history that includes Treatment of cardiac arrhythmia (N/A, 2020); Foot surgery; Carpal tunnel release (Right, 6/10/2020); Exploration of femoral artery (Left, 2020); Ablation of arrhythmogenic focus for atrial fibrillation (N/A, 2020); Application of wound vacuum-assisted closure device (Left, 8/3/2020); Wound debridement (Left, 2020); Closure of wound (Left, 2020); and Application of wound vacuum-assisted closure device (Left, 2020).    Social History:  Social History     Tobacco Use    Smoking status: Former Smoker     Types: Cigarettes     Quit date: 2019     Years since quittin.2    Smokeless tobacco: Never Used   Substance Use Topics    Alcohol use: No     Social History     Substance and Sexual Activity   Drug Use No       Family History:  Family History   Problem Relation Age of Onset    Cataracts Mother     Hypertension Mother     Thyroid disease  Mother     Diabetes Father     Hypertension Father     Hypertension Sister     Hypertension Brother     Amblyopia Neg Hx     Blindness Neg Hx     Cancer Neg Hx     Glaucoma Neg Hx     Macular degeneration Neg Hx     Retinal detachment Neg Hx     Strabismus Neg Hx     Stroke Neg Hx        Allergies:  Review of patient's allergies indicates:  No Known Allergies    Home Medications:  Prior to Admission medications    Medication Sig Start Date End Date Taking? Authorizing Provider   ALPRAZolam (XANAX) 0.25 MG tablet Take 1 tablet (0.25 mg total) by mouth 2 (two) times daily as needed for Anxiety. 8/24/20   Gordy Cordero MD   apixaban (ELIQUIS) 5 mg Tab Take 1 tablet (5 mg total) by mouth 2 (two) times daily. 6/20/20   Gordy Cordero MD   b complex vitamins capsule Take 1 capsule by mouth once daily.    Historical Provider   DULoxetine (CYMBALTA) 60 MG capsule Take 2 capsules (120 mg total) by mouth once daily. 7/13/20   Ricardo Ibrahim MD   furosemide (LASIX) 20 MG tablet Take 1 tablet (20 mg total) by mouth daily as needed (fluid). 7/31/20   Gordy Cordero MD   gluc-shikha-[facility]#[id number]-bor 750 mg-644 mg- 30 mg-1 mg Tab Take 1 tablet by mouth once daily.  1/1/15   Historical Provider   hydroCHLOROthiazide (HYDRODIURIL) 50 MG tablet Please hold (do not take) until seen by Primary Care Physician 7/24/20   Pedro Luis Coyle MD   HYDROcodone-acetaminophen (NORCO) 5-325 mg per tablet Take 1 tablet by mouth every 6 (six) hours as needed for Pain. 8/28/20   Gerson Child MD   krill/om-3/dha/epa/phospho/ast (MEGARED OMEGA-3 KRILL OIL ORAL)     Historical Provider   lisinopriL (PRINIVIL,ZESTRIL) 40 MG tablet Please hold (do not take) until seen by Primary Care Physician 7/24/20   Pedro Luis Coyle MD   MELATONIN ORAL Take 1-2 tablets by mouth nightly as needed (Sleep).    Historical Provider   melatonin-pyridoxine, vit B6, 5-1 mg Tab Take 5 mg by mouth.    Historical Provider   metoprolol succinate (TOPROL-XL)  "50 MG 24 hr tablet Take 1 tablet (50 mg total) by mouth 2 (two) times daily. 7/13/20 10/15/20  Gabriel Hawley MD   multivitamin (THERAGRAN) per tablet Take 1 tablet by mouth once daily.    Historical Provider   omeprazole (PRILOSEC) 20 MG capsule TAKE 1 CAPSULE BY MOUTH ONCE DAILY 2/20/20      ondansetron (ZOFRAN) 4 MG tablet Take 1 tablet (4 mg total) by mouth every 6 (six) hours as needed for Nausea. 8/7/20   Eladia Curiel MD   ondansetron (ZOFRAN) 4 MG tablet Take 1 tablet (4 mg total) by mouth every 6 (six) hours as needed.  Patient not taking: Reported on 8/28/2020 8/7/20   Eladia Curiel MD   oxyCODONE-acetaminophen (PERCOCET) 7.5-325 mg per tablet Take 1 tablet by mouth every 8 (eight) hours as needed for Pain. You may take 1 additional tablet the morning of wound vac changes 9/29/20   Dom Scott MD   traMADoL (ULTRAM) 50 mg tablet Take 1 tablet (50 mg total) by mouth every 8 (eight) hours as needed for Pain.  Patient not taking: Reported on 8/10/2020 7/24/20   Pedro Luis Coyle MD   traZODone (DESYREL) 100 MG tablet Take 1 tablet (100 mg total) by mouth nightly as needed for Insomnia. 7/13/20   Ricardo Ibrahim MD       Review of Systems:  Negative except for what is noted in HPI    Physical Exam:  VITAL SIGNS:   Vitals:    09/30/20 0906   BP: (!) 150/87   Pulse: 95   Weight: (!) 137 kg (302 lb 0.5 oz)   Height: 5' 6" (1.676 m)       General: Alert; No acute distress  Cardiovascular: Regular rate   Respiratory: Normal respiratory effort. Equal excursion.   Abdomen: Soft, nontender, nondistended  Extremity: Moves all extremities equally.  Neurologic: No focal deficit. Speech normal  Skin: left groin healing well           Assessment:  55 y.o.female presenting for follow up s/p deep closure of left groin wound and consultation for possible skin flap coverage. Her wound is healing well.     Plan:  No indication for skin flap coverage  Continue local wound care and allow for " epitheliaization        Eladia Curiel MD  PGY-1, Plastic Surgery  [facility]

## 2020-09-30 NOTE — PROGRESS NOTES
Patient presents to Plastic surgery Clinic after having a complex closure of left groin.  It has been healing with a wound VAC for 2 months.  Physical examination shows that the wound now just needs to repeat epithelialize.  It is a denuded fully granulated wound.  I recommend polymem  Return to clinic in approximately 4 weeks

## 2020-09-30 NOTE — LETTER
[facility]  [address]  [facility] [address]  Phone: [phone]  Fax: [phone] September 30, 2020        SEMAJ Márquez III, MD  [address]  [facility] [address]    Patient: Vicky Alvarado   MR Number: [id number]   YOB: 1965   Date of Visit: 9/30/2020     Dear Dr. SEMAJ Márquez III:    Thank you for referring Vicky Alvarado to me for evaluation. Below you will find relevant portions of my assessment and plan of care.    Ms. Alvarado presents to the Plastic Surgery Clinic after having a complex closure of left groin.  It has been healing with a wound VAC for 2 months.      Physical examination shows that the wound now just needs to repeat epithelialize.  It is a denuded fully granulated wound.  I recommend polymem.  Return to clinic in approximately 4 weeks.    If you have questions, please do not hesitate to call me. I look forward to following Vicky Alvarado along with you.    Sincerely,    Loco Hamilton MD  Section of Plastic Surgery  Department of Surgery  [facility]     CRB/hcr    CC:  Gordy Cordero MD

## 2020-10-02 ENCOUNTER — PATIENT MESSAGE (OUTPATIENT)
Dept: ELECTROPHYSIOLOGY | Facility: CLINIC | Age: 55
End: 2020-10-02

## 2020-10-02 ENCOUNTER — PATIENT MESSAGE (OUTPATIENT)
Dept: WOUND CARE | Facility: CLINIC | Age: 55
End: 2020-10-02

## 2020-10-02 ENCOUNTER — TELEPHONE (OUTPATIENT)
Dept: WOUND CARE | Facility: CLINIC | Age: 55
End: 2020-10-02

## 2020-10-05 ENCOUNTER — DOCUMENT SCAN (OUTPATIENT)
Dept: HOME HEALTH SERVICES | Facility: HOSPITAL | Age: 55
End: 2020-10-05
Payer: COMMERCIAL

## 2020-10-07 ENCOUNTER — TELEPHONE (OUTPATIENT)
Dept: SLEEP MEDICINE | Facility: CLINIC | Age: 55
End: 2020-10-07

## 2020-10-07 ENCOUNTER — OFFICE VISIT (OUTPATIENT)
Dept: SLEEP MEDICINE | Facility: CLINIC | Age: 55
End: 2020-10-07
Payer: COMMERCIAL

## 2020-10-07 DIAGNOSIS — G47.33 OSA (OBSTRUCTIVE SLEEP APNEA): Primary | ICD-10-CM

## 2020-10-07 PROCEDURE — 99213 OFFICE O/P EST LOW 20 MIN: CPT | Mod: 95,,, | Performed by: PSYCHIATRY & NEUROLOGY

## 2020-10-07 PROCEDURE — 99213 PR OFFICE/OUTPT VISIT, EST, LEVL III, 20-29 MIN: ICD-10-PCS | Mod: 95,,, | Performed by: PSYCHIATRY & NEUROLOGY

## 2020-10-07 NOTE — TELEPHONE ENCOUNTER
----- Message from Gunjan Doty MD sent at 10/7/2020  3:13 PM CDT -----  Please schdule 3 months follow upThan you!

## 2020-10-07 NOTE — PROGRESS NOTES
The patient location is: home  The chief complaint leading to consultation is: initial evaluation  Visit type: audiovisual  Total time spent with patient: 30 min  Each patient to whom he or she provides medical services by telemedicine is:  (1) informed of the relationship between the physician and patient and the respective role of any other health care provider with respect to management of the patient; and (2) notified that he or she may decline to receive medical services by telemedicine and may withdraw from such care at any time.  She is accompanied by her cat, Divya to this telemedicine appointment.       Vicky Alvarado is a 55 y.o. female seen today for CPAP  follow up. Last seen on 6/15/20.  Since that time she had HST positive for moderate REM predominant ERENDIRA.  She has received CPAP machine in July - only used it for a couple of weeks, and then had an ablation procedure for AFIB resulting in femoral artery laceration and long and tortuous wound healing process.  Due to pain and wound care she had to put CPAP on hold for the last couple of months.     She reported Chin strap related discomfort; but would have dry mouth when not using a chin strap; would try a FFM instead.  No aerophagia or air hunger. No significant mask leaks and mask  Discomfort with her nasal mask.    APAP 5-14 cm H2O; very few hours of use on a given night, not clear if she was actually asleep while wearing the mask:    Therapy Event Summary Date Range: 6/27/2020 - 9/24/2020      Compliance Summary  Apnea Indices  Ventilator Statistics      Average Breath Rate:  N/A    Days with Device Usage:  23 days  Average AHI:  1.8  Average % Patient Triggered Breaths:  N/A    Percentage of Days >=4 Hours:  3.3%  Average OA Index:  0.3  Average Tidal Volume:  N/A    Average Usage (Days Used):  2 hrs. 25 secs.  Average CA Index:  0.1  Average Minute Vent:  N/A    Average Usage (All Days):  30 mins. 46 secs.    Average % Flow Limited  "Breaths:  N/A        Large Leak  Periodic Breathing     Average Time in Large Leak:  4 mins. 8 secs.  Average % of Night in PB:  0.1%     Average % of Night in Large Leak:  3.4%               PREVIOUS SLEEP STUDIES:   none        Social History:  Occupation:yes  Bed partner: no  Exercise routine: no  Caffeine:  Coffee   In Am and occasionally in the afternoon; no cokes.  Alcohol: no  Smoking:yes - quit a year ago.    DME: [facility]  No vital signs were taken during this virtual visit.       Using [facility]: yes      ASSESSMENT:    1. ERENDIRA; moderate; REM predominant.  . The patient symptomatically has  excessive daytime sleepiness, snoring, excessive daytime fatigue, gasping for air in sleep and interrupted sleep  with exam findings of "a crowded oral airway and elevated body mass index. The patient has medical co-morbidities of atrial fibrillation,  which can be worsened by ERENDIRA. This warrants treatment.    2. Concern for PLMD; has muscle cramps.PLMI was 0 on her HST from 6/2020.      PLAN:    Continue  Auto CPAP at 5-14  Cm H2O.  Will change the prescription for a full face mask.     Education: During our discussion today, we talked about the etiology of obstructive sleep apnea as well as the potential ramifications of untreated sleep apnea, which could include daytime sleepiness, hypertension, heart disease and/or stroke.      We discussed potential treatment options, which could include weight loss, body positioning, continuous positive airway pressure (CPAP), or referral for surgical consideration. The patient preferred CPAP option.     Discussed purpose of PAP therapy, health benefits of CPAP, as well as the potential ramifications of untreated sleep apnea, which could include daytime sleepiness, hypertension, heart disease and/or stroke. An AHI of 15 is associated with increased risk CVD.     Regular replacement of CPAP mask, tubing and filter was recommended.    The patient was given open opportunity to ask " questions and/or express concerns about treatment plan. Two point patient identifier confirmed.     Precautions: The patient was advised to abstain from driving should he feel sleepy or drowsy.    Follow up: me after 3 months days  of PAP use.  31-minute visit. >50% spent counseling patient and coordination of care.

## 2020-10-16 ENCOUNTER — TELEPHONE (OUTPATIENT)
Dept: SLEEP MEDICINE | Facility: CLINIC | Age: 55
End: 2020-10-16

## 2020-10-16 ENCOUNTER — PATIENT MESSAGE (OUTPATIENT)
Dept: VASCULAR SURGERY | Facility: CLINIC | Age: 55
End: 2020-10-16

## 2020-10-16 NOTE — TELEPHONE ENCOUNTER
Visit Type: ESTABLISHED PATIENT - VIRTUAL ([id number])      1/11/2021    2:30 PM  30 mins.  Gunjan Doty MD      [facility]      Patient Comments:   Follow up visit

## 2020-10-19 RX ORDER — OXYCODONE AND ACETAMINOPHEN 7.5; 325 MG/1; MG/1
1 TABLET ORAL EVERY 8 HOURS PRN
Qty: 50 TABLET | Refills: 0 | Status: SHIPPED | OUTPATIENT
Start: 2020-10-19 | End: 2021-01-29

## 2020-10-22 ENCOUNTER — DOCUMENT SCAN (OUTPATIENT)
Dept: HOME HEALTH SERVICES | Facility: HOSPITAL | Age: 55
End: 2020-10-22
Payer: COMMERCIAL

## 2020-10-23 ENCOUNTER — OFFICE VISIT (OUTPATIENT)
Dept: WOUND CARE | Facility: CLINIC | Age: 55
End: 2020-10-23
Payer: COMMERCIAL

## 2020-10-23 ENCOUNTER — PATIENT MESSAGE (OUTPATIENT)
Dept: SLEEP MEDICINE | Facility: CLINIC | Age: 55
End: 2020-10-23

## 2020-10-23 VITALS
WEIGHT: 293 LBS | DIASTOLIC BLOOD PRESSURE: 78 MMHG | HEART RATE: 52 BPM | BODY MASS INDEX: 47.09 KG/M2 | SYSTOLIC BLOOD PRESSURE: 130 MMHG | HEIGHT: 66 IN | TEMPERATURE: 98 F

## 2020-10-23 DIAGNOSIS — T81.30XA WOUND DEHISCENCE: Primary | ICD-10-CM

## 2020-10-23 PROCEDURE — 3075F SYST BP GE 130 - 139MM HG: CPT | Mod: CPTII,S$GLB,, | Performed by: NURSE PRACTITIONER

## 2020-10-23 PROCEDURE — 3075F PR MOST RECENT SYSTOLIC BLOOD PRESS GE 130-139MM HG: ICD-10-PCS | Mod: CPTII,S$GLB,, | Performed by: NURSE PRACTITIONER

## 2020-10-23 PROCEDURE — 99999 PR PBB SHADOW E&M-EST. PATIENT-LVL V: CPT | Mod: PBBFAC,,, | Performed by: NURSE PRACTITIONER

## 2020-10-23 PROCEDURE — 3008F BODY MASS INDEX DOCD: CPT | Mod: CPTII,S$GLB,, | Performed by: NURSE PRACTITIONER

## 2020-10-23 PROCEDURE — 99999 PR PBB SHADOW E&M-EST. PATIENT-LVL V: ICD-10-PCS | Mod: PBBFAC,,, | Performed by: NURSE PRACTITIONER

## 2020-10-23 PROCEDURE — 99213 OFFICE O/P EST LOW 20 MIN: CPT | Mod: S$GLB,,, | Performed by: NURSE PRACTITIONER

## 2020-10-23 PROCEDURE — 3008F PR BODY MASS INDEX (BMI) DOCUMENTED: ICD-10-PCS | Mod: CPTII,S$GLB,, | Performed by: NURSE PRACTITIONER

## 2020-10-23 PROCEDURE — 3078F DIAST BP <80 MM HG: CPT | Mod: CPTII,S$GLB,, | Performed by: NURSE PRACTITIONER

## 2020-10-23 PROCEDURE — 3078F PR MOST RECENT DIASTOLIC BLOOD PRESSURE < 80 MM HG: ICD-10-PCS | Mod: CPTII,S$GLB,, | Performed by: NURSE PRACTITIONER

## 2020-10-23 PROCEDURE — 99213 PR OFFICE/OUTPT VISIT, EST, LEVL III, 20-29 MIN: ICD-10-PCS | Mod: S$GLB,,, | Performed by: NURSE PRACTITIONER

## 2020-10-23 NOTE — PROGRESS NOTES
Subjective:       Patient ID: Vicky Alvarado is a 55 y.o. female.    Chief Complaint: Wound Check    Wound Check  This is a 55 year old female referred by Dr. Márquez for evaluation and management of a surgical wound to the left groin.  She underwent an urgent repair of the left SFA branch for a laceration on 7/21/20 with Dr. Márquez.  She then had a wound vac placed when the wound dehisced on 8/3/20 with Dr. Márquez. She developed a hematoma and underwent a left groin washout and application of wound vac on 8/6/20 with Dr. Márquez.  She is seen today for reevaluation of this wound. Hydrofiber is being used to dress the wound and it is healing as evidenced by wound contracture. She is afebrile. She denies increased redness, swelling or purulent drainage.  She does not complain of pain.  She has home health services through [facility].    Patient Active Problem List   Diagnosis    Opiate dependence    Opioid dependence in remission    Hypertension    Chronic diastolic heart failure    Atrial Fibrillation (paroxysmal)    Cardiomyopathy    Avascular necrosis of lunate    Body mass index (BMI) 45.0-49.9, adult    History of opioid abuse    Severe obesity (BMI >= 40)    Pre-diabetes    Right carpal tunnel syndrome    Weakness of right hand    ERENDIRA (obstructive sleep apnea)    Atrial fibrillation    Depression    Wound dehiscence     Past Medical History:   Diagnosis Date    Atrial fibrillation     cardioversion    Avascular necrosis     L hand    Depression     GERD (gastroesophageal reflux disease)     Hx of psychiatric care     Hypertension     Psychiatric problem      Past Surgical History:   Procedure Laterality Date    ABLATION OF ARRHYTHMOGENIC FOCUS FOR ATRIAL FIBRILLATION N/A 7/20/2020    Procedure: Ablation atrial fibrillation;  Surgeon: Gabriel Hawley MD;  Location: [facility] EP LAB;  Service: Cardiology;  Laterality: N/A;  afib, PVI, RFA, REENA, SHADY, anes, MB, 3 Prep     APPLICATION OF WOUND VACUUM-ASSISTED CLOSURE DEVICE Left 8/3/2020    Procedure: APPLICATION, WOUND VAC;  Surgeon: SEMAJ Márquez III, MD;  [facility];  Service: Peripheral Vascular;  Laterality: Left;    APPLICATION OF WOUND VACUUM-ASSISTED CLOSURE DEVICE Left 8/6/2020    Procedure: APPLICATION, WOUND VAC;  Surgeon: SEMAJ Márquez III, MD;  [facility];  Service: Peripheral Vascular;  Laterality: Left;    CARPAL TUNNEL RELEASE Right 6/10/2020    Procedure: RELEASE, CARPAL TUNNEL - RIGHT;  Surgeon: Adelaida Hall MD;  Location: [facility] OR;  Service: Orthopedics;  Laterality: Right;  GENERAL AND REGIONAL    CLOSURE OF WOUND Left 8/6/2020    Procedure: CLOSURE, WOUND;  Surgeon: SEMAJ Márquez III, MD;  Location: [facility] OR [address] [place] [address];  Service: Peripheral Vascular;  Laterality: Left;  Complex    EXPLORATION OF FEMORAL ARTERY Left 7/21/2020    Procedure: EXPLORATION, ARTERY, FEMORAL;  Surgeon: SEMAJ Márquez III, MD;  Location: [facility] OR [address] [place] [address];  Service: Peripheral Vascular;  Laterality: Left;  1. Urgent Direct repair, L SFA branch laceration    FOOT SURGERY      TREATMENT OF CARDIAC ARRHYTHMIA N/A 1/29/2020    Procedure: CARDIOVERSION;  Surgeon: Gabriel Hawley MD;  Location: [facility] EP LAB;  Service: Cardiology;  Laterality: N/A;  af, demi, dccv, anes, mb, 345    WOUND DEBRIDEMENT Left 8/6/2020    Procedure: DEBRIDEMENT, WOUND;  Surgeon: SEMAJ Márquez III, MD;  Location: [address];  Service: Peripheral Vascular;  Laterality: Left;     Review of Systems   Constitutional: Negative for chills, diaphoresis and fever.   HENT: Negative for hearing loss, postnasal drip, rhinorrhea, sinus pressure, sneezing, sore throat, tinnitus and trouble swallowing.    Eyes: Negative for visual disturbance.   Respiratory: Positive for apnea (on CPAP). Negative for cough, shortness of breath and wheezing.    Cardiovascular: Negative for chest pain, palpitations and leg swelling.    Gastrointestinal: Positive for constipation. Negative for diarrhea, nausea and vomiting.   Genitourinary: Negative for difficulty urinating, dysuria, frequency and hematuria.   Musculoskeletal: Negative for arthralgias, back pain and joint swelling.   Skin: Positive for wound.   Neurological: Negative for dizziness, weakness, light-headedness and headaches.   Hematological: Bruises/bleeds easily.   Psychiatric/Behavioral: Positive for dysphoric mood and sleep disturbance. Negative for confusion and decreased concentration. The patient is nervous/anxious.        Objective:      Physical Exam  Vitals signs and nursing note reviewed.   Constitutional:       General: She is not in acute distress.     Appearance: She is well-developed. She is not diaphoretic.   HENT:      Head: Normocephalic and atraumatic.   Pulmonary:      Effort: Pulmonary effort is normal. No respiratory distress.   Musculoskeletal:         General: No tenderness.        Legs:    Skin:     General: Skin is warm and dry.      Findings: No erythema or rash.      Nails: There is no clubbing.     Neurological:      Mental Status: She is alert and oriented to person, place, and time.   Psychiatric:         Behavior: Behavior normal.         Thought Content: Thought content normal.         Judgment: Judgment normal.         Left groin      Assessment:       1. Wound dehiscence               Plan:            Cleanse wound with wound cleanser.  Skin prep to periwound area.  Place hydrofiber in the wound base.  Cover with 4x4s and secure with medipore tape or an island dressing.  Change primary dressing three times weekly and secondary dressing as needed.  Written and verbal instructions given to patient.  [facility] notified of orders via email  We will ask them to see the patient three times weekly.  Return to clinic in one month.

## 2020-10-26 ENCOUNTER — PATIENT MESSAGE (OUTPATIENT)
Dept: SLEEP MEDICINE | Facility: CLINIC | Age: 55
End: 2020-10-26

## 2020-10-27 ENCOUNTER — DOCUMENT SCAN (OUTPATIENT)
Dept: HOME HEALTH SERVICES | Facility: HOSPITAL | Age: 55
End: 2020-10-27
Payer: COMMERCIAL

## 2020-10-29 ENCOUNTER — HOSPITAL ENCOUNTER (OUTPATIENT)
Dept: CARDIOLOGY | Facility: CLINIC | Age: 55
Discharge: HOME | End: 2020-10-29
Payer: COMMERCIAL

## 2020-10-29 ENCOUNTER — OFFICE VISIT (OUTPATIENT)
Dept: ELECTROPHYSIOLOGY | Facility: CLINIC | Age: 55
End: 2020-10-29
Payer: COMMERCIAL

## 2020-10-29 VITALS
SYSTOLIC BLOOD PRESSURE: 116 MMHG | BODY MASS INDEX: 46.23 KG/M2 | WEIGHT: 287.69 LBS | HEIGHT: 66 IN | DIASTOLIC BLOOD PRESSURE: 60 MMHG | HEART RATE: 69 BPM

## 2020-10-29 DIAGNOSIS — I48.0 PAF (PAROXYSMAL ATRIAL FIBRILLATION): Primary | ICD-10-CM

## 2020-10-29 DIAGNOSIS — I48.91 ATRIAL FIBRILLATION, UNSPECIFIED TYPE: ICD-10-CM

## 2020-10-29 PROCEDURE — 93005 ELECTROCARDIOGRAM TRACING: CPT | Mod: S$GLB,,, | Performed by: INTERNAL MEDICINE

## 2020-10-29 PROCEDURE — 99214 OFFICE O/P EST MOD 30 MIN: CPT | Mod: S$GLB,,, | Performed by: INTERNAL MEDICINE

## 2020-10-29 PROCEDURE — 3078F PR MOST RECENT DIASTOLIC BLOOD PRESSURE < 80 MM HG: ICD-10-PCS | Mod: CPTII,S$GLB,, | Performed by: INTERNAL MEDICINE

## 2020-10-29 PROCEDURE — 99999 PR PBB SHADOW E&M-EST. PATIENT-LVL IV: ICD-10-PCS | Mod: PBBFAC,,, | Performed by: INTERNAL MEDICINE

## 2020-10-29 PROCEDURE — 3008F BODY MASS INDEX DOCD: CPT | Mod: CPTII,S$GLB,, | Performed by: INTERNAL MEDICINE

## 2020-10-29 PROCEDURE — 93010 ELECTROCARDIOGRAM REPORT: CPT | Mod: S$GLB,,, | Performed by: INTERNAL MEDICINE

## 2020-10-29 PROCEDURE — 3074F PR MOST RECENT SYSTOLIC BLOOD PRESSURE < 130 MM HG: ICD-10-PCS | Mod: CPTII,S$GLB,, | Performed by: INTERNAL MEDICINE

## 2020-10-29 PROCEDURE — 3008F PR BODY MASS INDEX (BMI) DOCUMENTED: ICD-10-PCS | Mod: CPTII,S$GLB,, | Performed by: INTERNAL MEDICINE

## 2020-10-29 PROCEDURE — 3074F SYST BP LT 130 MM HG: CPT | Mod: CPTII,S$GLB,, | Performed by: INTERNAL MEDICINE

## 2020-10-29 PROCEDURE — 93005 RHYTHM STRIP: ICD-10-PCS | Mod: S$GLB,,, | Performed by: INTERNAL MEDICINE

## 2020-10-29 PROCEDURE — 3078F DIAST BP <80 MM HG: CPT | Mod: CPTII,S$GLB,, | Performed by: INTERNAL MEDICINE

## 2020-10-29 PROCEDURE — 93010 RHYTHM STRIP: ICD-10-PCS | Mod: S$GLB,,, | Performed by: INTERNAL MEDICINE

## 2020-10-29 PROCEDURE — 99999 PR PBB SHADOW E&M-EST. PATIENT-LVL IV: CPT | Mod: PBBFAC,,, | Performed by: INTERNAL MEDICINE

## 2020-10-29 PROCEDURE — 99214 PR OFFICE/OUTPT VISIT, EST, LEVL IV, 30-39 MIN: ICD-10-PCS | Mod: S$GLB,,, | Performed by: INTERNAL MEDICINE

## 2020-10-29 NOTE — PROGRESS NOTES
Subjective:    Patient ID:  Vicky Alvarado is a 55 y.o. female who presents for follow-up of Atrial Fibrillation      55 yoF here for AF management.     7/7/2020: 1/9/20, she had her upper teeth pulled for dentures and been on penicillin since that time. She was in SR. She takes atenolol 10 mg q.d. for hypertension. She underwent REENA/CV 1/29/2020. EF 40% in AF. PET Stress 3/2020 revealed no ischemia. Repeat echo in NSR showed EF 60%. She has had recurrence, once during carpal tunnel surgery, and several other times since.  Regarding her family history, her mother and brother both have atrial fibrillation. She is on elquis for CVA prophylaxis. CHADSVASC of at least 3. No bleeding issues with xarelto.      Interval history: PVI 7/20/2020. 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 She underwent emergent exploratory surgery by Dr Rose who ligated a branch of her left SFA. She had further wound issues requiring wash out and wound vac over the next few months. This has been a challenging recovery but she is nearing completion. No symptomatic or documented AF recurrence.     TTE 5/2020:  · Normal left ventricular systolic function. The estimated ejection fraction is 60%.  · Severe left atrial enlargement.  · Grade I (mild) left ventricular diastolic dysfunction consistent with impaired relaxation.  · Normal right ventricular systolic function.  · Mild right atrial enlargement.  · Mild aortic valve stenosis. Aortic valve area is 1.83 cm2; peak velocity is 2.42 m/s; mean gradient is 12 mmHg.  · Mild tricuspid regurgitation.  · Normal central venous pressure (3 mmHg).  · The estimated PA systolic pressure is 22 mmHg.     PET Stress 3/2020:  The relative PET images are normal showing no clinically significant regional resting or stress induced perfusion defects.    Whole heart absolute myocardial perfusion (cc/min/g) averaged 0.47 cc/min/g at rest (which is reduced), 0.93 cc/min/g at  stress (which is moderately reduced), and 2.03 CFR (which is mildly reduced).    Gated perfusion images showed an ejection fraction of 78% at rest and 72% during stress. Normal ejection fraction is greater than 51%.    Wall motion was normal at rest and during stress.    LV cavity size is normal at rest and stress.    The EKG portion of this study is negative for ischemia.    There were no arrhythmias during stress.    The patient reported no chest pain during the stress test.    There are no prior studies for comparison.        TTE (January 2020):   · Atrial fibrillation observed.  · Left ventricular systolic function. The estimated ejection fraction is 40%.  · Local segmental wall motion abnormalities.  · Moderate left atrial enlargement.  · Normal right ventricular systolic function.  · Moderate right atrial enlargement.  · Possibly mild aortic valve stenosis ( reduced LENCHO but valve opening appears near normal).  · Aortic valve area is 1.67 cm2; peak velocity is 1.77 m/s; mean gradient is 8 mmHg.  · Mild mitral regurgitation.  · Mild tricuspid regurgitation.  · Normal central venous pressure (3 mmHg).  · The estimated PA systolic pressure is 29 mmHg.      Past Medical History:  No date: Atrial fibrillation      Comment:  cardioversion  No date: Avascular necrosis      Comment:  L hand  No date: Depression  No date: GERD (gastroesophageal reflux disease)  No date: Hx of psychiatric care  No date: Hypertension  No date: Psychiatric problem    Past Surgical History:  7/20/2020: ABLATION OF ARRHYTHMOGENIC FOCUS FOR ATRIAL FIBRILLATION;   N/A      Comment:  Procedure: Ablation atrial fibrillation;  Surgeon:                Gabriel Hawley MD;  Location: [facility] EP LAB;  Service:               Cardiology;  Laterality: N/A;  afib, PVI, RFA, REENA, SHADY,                anes, MB, 3 Prep  8/3/2020: APPLICATION OF WOUND VACUUM-ASSISTED CLOSURE DEVICE; Left      Comment:  Procedure: APPLICATION, WOUND VAC;  Surgeon: SEMAJ HERNANDEZ                 Willi BURTON MD;  [facility]: [facility];  Service:               Peripheral Vascular;  Laterality: Left;  8/6/2020: APPLICATION OF WOUND VACUUM-ASSISTED CLOSURE DEVICE; Left      Comment:  Procedure: APPLICATION, WOUND VAC;  Surgeon: SEMAJ Márquez III, MD;  [facility];  Service:               Peripheral Vascular;  Laterality: Left;  6/10/2020: CARPAL TUNNEL RELEASE; Right      Comment:  Procedure: RELEASE, CARPAL TUNNEL - RIGHT;  Surgeon:                Adelaida Hall MD;  Location: [facility] OR;  Service:                Orthopedics;  Laterality: Right;  GENERAL AND REGIONAL  8/6/2020: CLOSURE OF WOUND; Left      Comment:  Procedure: CLOSURE, WOUND;  Surgeon: SEMAJ Márquez III, MD;  [facility];  Service: Peripheral               Vascular;  Laterality: Left;  Complex  7/21/2020: EXPLORATION OF FEMORAL ARTERY; Left      Comment:  Procedure: EXPLORATION, ARTERY, FEMORAL;  Surgeon: SEMAJ Márquez III, MD;  [facility];  Service:               Peripheral Vascular;  Laterality: Left;  1. Urgent Direct               repair, L SFA branch laceration  No date: FOOT SURGERY  1/29/2020: TREATMENT OF CARDIAC ARRHYTHMIA; N/A      Comment:  Procedure: CARDIOVERSION;  Surgeon: Gabriel Hawley MD;  Location: [facility] EP LAB;  Service: Cardiology;                 Laterality: N/A;  af, demi, dccv, anes, mb, 345  8/6/2020: WOUND DEBRIDEMENT; Left      Comment:  Procedure: DEBRIDEMENT, WOUND;  Surgeon: SEMAJ Márquez III, MD;  [address];  Service: Peripheral               Vascular;  Laterality: Left;    Social History    Socioeconomic History      Marital status: Single      Spouse name: Not on file      Number of children: Not on file      Years of education: Not on file      Highest education level: Not on file    Occupational History      Not on file    Social Needs       Financial resource strain: Not on file      Food insecurity        Worry: Not on file        Inability: Not on file      Transportation needs        Medical: Not on file        Non-medical: Not on file    Tobacco Use      Smoking status: Former Smoker        Types: Cigarettes        Quit date: 2019        Years since quittin.3      Smokeless tobacco: Never Used    Substance and Sexual Activity      Alcohol use: No      Drug use: No      Sexual activity: Not on file    Lifestyle      Physical activity        Days per week: Not on file        Minutes per session: Not on file      Stress: Not on file    Relationships      Social connections        Talks on phone: Not on file        Gets together: Not on file        Attends [employer] service: Not on file        Active member of club or organization: Not on file        Attends meetings of clubs or organizations: Not on file        Relationship status: Not on file    Other Topics      Concerns:        Patient feels they ought to cut down on drinking/drug use: Not Asked        Patient annoyed by others criticizing their drinking/drug use: Not Asked        Patient has felt bad or guilty about drinking/drug use: Not Asked        Patient has had a drink/used drugs as an eye opener in the AM: Not Asked    Social History Narrative      Not on file      Review of patient's family history indicates:  Problem: Cataracts      Relation: Mother          Age of Onset: (Not Specified)  Problem: Hypertension      Relation: Mother          Age of Onset: (Not Specified)  Problem: Thyroid disease      Relation: Mother          Age of Onset: (Not Specified)  Problem: Diabetes      Relation: Father          Age of Onset: (Not Specified)  Problem: Hypertension      Relation: Father          Age of Onset: (Not Specified)  Problem: Hypertension      Relation: Sister          Age of Onset: (Not Specified)  Problem: Hypertension      Relation: Brother          Age of Onset: (Not  Specified)  Problem: Amblyopia      Relation: Neg Hx          Age of Onset: (Not Specified)  Problem: Blindness      Relation: Neg Hx          Age of Onset: (Not Specified)  Problem: Cancer      Relation: Neg Hx          Age of Onset: (Not Specified)  Problem: Glaucoma      Relation: Neg Hx          Age of Onset: (Not Specified)  Problem: Macular degeneration      Relation: Neg Hx          Age of Onset: (Not Specified)  Problem: Retinal detachment      Relation: Neg Hx          Age of Onset: (Not Specified)  Problem: Strabismus      Relation: Neg Hx          Age of Onset: (Not Specified)  Problem: Stroke      Relation: Neg Hx          Age of Onset: (Not Specified)        Review of Systems   Constitution: Negative.   HENT: Negative.    Eyes: Negative.    Cardiovascular: Negative for chest pain, dyspnea on exertion, leg swelling, near-syncope, palpitations and syncope.   Respiratory: Negative.  Negative for shortness of breath.    Endocrine: Negative.    Hematologic/Lymphatic: Negative.    Skin: Negative.    Musculoskeletal: Negative.    Gastrointestinal: Negative.    Genitourinary: Negative.    Neurological: Negative.  Negative for dizziness and light-headedness.   Psychiatric/Behavioral: Negative.    Allergic/Immunologic: Negative.         Objective:    Physical Exam   Constitutional: She is oriented to person, place, and time. She appears well-developed and well-nourished. No distress.   HENT:   Head: Normocephalic and atraumatic.   Eyes: Pupils are equal, round, and reactive to light. EOM are normal.   Neck: Normal range of motion. No JVD present. No thyromegaly present.   Cardiovascular: Normal rate, regular rhythm, S1 normal, S2 normal and normal heart sounds. PMI is not displaced. Exam reveals no gallop and no friction rub.   No murmur heard.  Pulmonary/Chest: Effort normal and breath sounds normal. No respiratory distress. She has no wheezes. She has no rales.   Abdominal: Soft. Bowel sounds are normal. She  exhibits no distension. There is no abdominal tenderness. There is no rebound and no guarding.   Musculoskeletal: Normal range of motion.         General: No tenderness or edema.      Comments: L upper wound healing   Neurological: She is alert and oriented to person, place, and time. No cranial nerve deficit.   Skin: Skin is warm and dry. No rash noted. No erythema.   Psychiatric: She has a normal mood and affect. Her behavior is normal. Judgment and thought content normal.   Vitals reviewed.    ECG: NSR nl KS, QRS, QTc        Assessment:       1. Atrial Fibrillation (paroxysmal)         Plan:       55 yoF pAF, arrhythmia induced CM here for follow up after ablation. She has no symptomatic recurrence of AF. She is nearing the end of her wound healing for her L groin injury. Should she require future atrial ablation we could get access in the R groin and upper extremity vessels. Will continue current therapy. RTC 6m

## 2020-11-06 ENCOUNTER — OFFICE VISIT (OUTPATIENT)
Dept: OPTOMETRY | Facility: CLINIC | Age: 55
End: 2020-11-06
Payer: COMMERCIAL

## 2020-11-06 DIAGNOSIS — H52.13 MYOPIA OF BOTH EYES WITH ASTIGMATISM AND PRESBYOPIA: Primary | ICD-10-CM

## 2020-11-06 DIAGNOSIS — H52.4 MYOPIA OF BOTH EYES WITH ASTIGMATISM AND PRESBYOPIA: Primary | ICD-10-CM

## 2020-11-06 DIAGNOSIS — H52.203 MYOPIA OF BOTH EYES WITH ASTIGMATISM AND PRESBYOPIA: Primary | ICD-10-CM

## 2020-11-06 PROCEDURE — 92014 COMPRE OPH EXAM EST PT 1/>: CPT | Mod: S$GLB,,, | Performed by: OPTOMETRIST

## 2020-11-06 PROCEDURE — 99999 PR PBB SHADOW E&M-EST. PATIENT-LVL III: CPT | Mod: PBBFAC,,, | Performed by: OPTOMETRIST

## 2020-11-06 PROCEDURE — 92015 DETERMINE REFRACTIVE STATE: CPT | Mod: S$GLB,,, | Performed by: OPTOMETRIST

## 2020-11-06 PROCEDURE — 92015 PR REFRACTION: ICD-10-PCS | Mod: S$GLB,,, | Performed by: OPTOMETRIST

## 2020-11-06 PROCEDURE — 92014 PR EYE EXAM, EST PATIENT,COMPREHESV: ICD-10-PCS | Mod: S$GLB,,, | Performed by: OPTOMETRIST

## 2020-11-06 PROCEDURE — 99999 PR PBB SHADOW E&M-EST. PATIENT-LVL III: ICD-10-PCS | Mod: PBBFAC,,, | Performed by: OPTOMETRIST

## 2020-11-06 NOTE — PROGRESS NOTES
"HPI     Overdue Annual Eye Exam   DLS- 10/22/2018 Dr. Montalvo     Pt sts wearing 2018 glasses, only notices changes difficulty reading /   near but always has. Distance seems to be perfect. Denies pain   (-)Flashes (+)Floaters and a light that floats around "like a fallen star"   will still see when closing eyes  (-)Itch, (-)tear, (-)burn, (-)Dryness.  (-) OTC Drops  (-)Photophobia  (-)Glare      No past eye sx / procedures     Famohx Mother- cataracts     Last edited by Destiny Charlton on 11/6/2020  2:53 PM. (History)            Assessment /Plan     For exam results, see Encounter Report.    Myopia of both eyes with astigmatism and presbyopia  -[facility]  Eyeglass Final Rx     Eyeglass Final Rx       Sphere Cylinder Axis Dist VA Add    Right -7.75 +3.25 180 20/20 +2.25    Left -8.50 +3.50 010 20/20 +2.25    Type: PAL    Expiration Date: 11/7/2021                  RTC 1 yr                 "

## 2020-11-18 ENCOUNTER — OFFICE VISIT (OUTPATIENT)
Dept: WOUND CARE | Facility: CLINIC | Age: 55
End: 2020-11-18
Payer: COMMERCIAL

## 2020-11-18 VITALS
SYSTOLIC BLOOD PRESSURE: 155 MMHG | BODY MASS INDEX: 46.06 KG/M2 | DIASTOLIC BLOOD PRESSURE: 76 MMHG | HEIGHT: 66 IN | TEMPERATURE: 98 F | WEIGHT: 286.63 LBS | HEART RATE: 72 BPM

## 2020-11-18 DIAGNOSIS — T81.30XA WOUND DEHISCENCE: Primary | ICD-10-CM

## 2020-11-18 PROCEDURE — 99213 OFFICE O/P EST LOW 20 MIN: CPT | Mod: S$GLB,,, | Performed by: NURSE PRACTITIONER

## 2020-11-18 PROCEDURE — 3008F PR BODY MASS INDEX (BMI) DOCUMENTED: ICD-10-PCS | Mod: CPTII,S$GLB,, | Performed by: NURSE PRACTITIONER

## 2020-11-18 PROCEDURE — 3077F PR MOST RECENT SYSTOLIC BLOOD PRESSURE >= 140 MM HG: ICD-10-PCS | Mod: CPTII,S$GLB,, | Performed by: NURSE PRACTITIONER

## 2020-11-18 PROCEDURE — 99999 PR PBB SHADOW E&M-EST. PATIENT-LVL V: ICD-10-PCS | Mod: PBBFAC,,, | Performed by: NURSE PRACTITIONER

## 2020-11-18 PROCEDURE — 1125F AMNT PAIN NOTED PAIN PRSNT: CPT | Mod: S$GLB,,, | Performed by: NURSE PRACTITIONER

## 2020-11-18 PROCEDURE — 1125F PR PAIN SEVERITY QUANTIFIED, PAIN PRESENT: ICD-10-PCS | Mod: S$GLB,,, | Performed by: NURSE PRACTITIONER

## 2020-11-18 PROCEDURE — 3008F BODY MASS INDEX DOCD: CPT | Mod: CPTII,S$GLB,, | Performed by: NURSE PRACTITIONER

## 2020-11-18 PROCEDURE — 3077F SYST BP >= 140 MM HG: CPT | Mod: CPTII,S$GLB,, | Performed by: NURSE PRACTITIONER

## 2020-11-18 PROCEDURE — 3078F DIAST BP <80 MM HG: CPT | Mod: CPTII,S$GLB,, | Performed by: NURSE PRACTITIONER

## 2020-11-18 PROCEDURE — 99213 PR OFFICE/OUTPT VISIT, EST, LEVL III, 20-29 MIN: ICD-10-PCS | Mod: S$GLB,,, | Performed by: NURSE PRACTITIONER

## 2020-11-18 PROCEDURE — 3078F PR MOST RECENT DIASTOLIC BLOOD PRESSURE < 80 MM HG: ICD-10-PCS | Mod: CPTII,S$GLB,, | Performed by: NURSE PRACTITIONER

## 2020-11-18 PROCEDURE — 99999 PR PBB SHADOW E&M-EST. PATIENT-LVL V: CPT | Mod: PBBFAC,,, | Performed by: NURSE PRACTITIONER

## 2020-11-18 NOTE — PROGRESS NOTES
Subjective:       Patient ID: Vicky Alvarado is a 55 y.o. female.    Chief Complaint: Wound Check    Wound Check  This is a 55 year old female referred by Dr. Márquez for evaluation and management of a surgical wound to the left groin.  She underwent an urgent repair of the left SFA branch for a laceration on 7/21/20 with Dr. Márquez.  She then had a wound vac placed when the wound dehisced on 8/3/20 with Dr. Márquez. She developed a hematoma and underwent a left groin washout and application of wound vac on 8/6/20 with Dr. Márquez.  She is seen today for reevaluation of this wound. Hydrofiber is being used to dress the wound and it is healing as evidenced by wound contracture. She is afebrile. She denies increased redness, swelling or purulent drainage.  Her pain level is 5/10.  She has home health services through [facility].    Patient Active Problem List   Diagnosis    Opiate dependence    Opioid dependence in remission    Hypertension    Chronic diastolic heart failure    Atrial Fibrillation (paroxysmal)    Cardiomyopathy    Avascular necrosis of lunate    Body mass index (BMI) 45.0-49.9, adult    History of opioid abuse    Severe obesity (BMI >= 40)    Pre-diabetes    Right carpal tunnel syndrome    Weakness of right hand    ERENDIRA (obstructive sleep apnea)    Atrial fibrillation    Depression    Wound dehiscence     Past Medical History:   Diagnosis Date    Atrial fibrillation     cardioversion    Avascular necrosis     L hand    Depression     GERD (gastroesophageal reflux disease)     Hx of psychiatric care     Hypertension     Psychiatric problem      Past Surgical History:   Procedure Laterality Date    ABLATION OF ARRHYTHMOGENIC FOCUS FOR ATRIAL FIBRILLATION N/A 7/20/2020    Procedure: Ablation atrial fibrillation;  Surgeon: Gabriel Hawley MD;  Location: [facility] EP LAB;  Service: Cardiology;  Laterality: N/A;  afib, PVI, RFA, REENA, SHADY, anes, MB, 3 Prep     APPLICATION OF WOUND VACUUM-ASSISTED CLOSURE DEVICE Left 8/3/2020    Procedure: APPLICATION, WOUND VAC;  Surgeon: SEMAJ Márquez III, MD;  [facility];  Service: Peripheral Vascular;  Laterality: Left;    APPLICATION OF WOUND VACUUM-ASSISTED CLOSURE DEVICE Left 8/6/2020    Procedure: APPLICATION, WOUND VAC;  Surgeon: SEMAJ Márquez III, MD;  [facility];  Service: Peripheral Vascular;  Laterality: Left;    CARPAL TUNNEL RELEASE Right 6/10/2020    Procedure: RELEASE, CARPAL TUNNEL - RIGHT;  Surgeon: Adelaida Hall MD;  Location: [facility] OR;  Service: Orthopedics;  Laterality: Right;  GENERAL AND REGIONAL    CLOSURE OF WOUND Left 8/6/2020    Procedure: CLOSURE, WOUND;  Surgeon: SEMAJ Márquez III, MD;  Location: [facility] OR [address] [place] [address];  Service: Peripheral Vascular;  Laterality: Left;  Complex    EXPLORATION OF FEMORAL ARTERY Left 7/21/2020    Procedure: EXPLORATION, ARTERY, FEMORAL;  Surgeon: SEMAJ Márquez III, MD;  Location: [facility] OR [address] [place] [address];  Service: Peripheral Vascular;  Laterality: Left;  1. Urgent Direct repair, L SFA branch laceration    FOOT SURGERY      TREATMENT OF CARDIAC ARRHYTHMIA N/A 1/29/2020    Procedure: CARDIOVERSION;  Surgeon: Gabriel Hawley MD;  Location: [facility] EP LAB;  Service: Cardiology;  Laterality: N/A;  af, demi, dccv, anes, mb, 345    WOUND DEBRIDEMENT Left 8/6/2020    Procedure: DEBRIDEMENT, WOUND;  Surgeon: SEMAJ Márquez III, MD;  Location: [address];  Service: Peripheral Vascular;  Laterality: Left;     Review of Systems   Constitutional: Negative for chills, diaphoresis and fever.   HENT: Negative for hearing loss, postnasal drip, rhinorrhea, sinus pressure, sneezing, sore throat, tinnitus and trouble swallowing.    Eyes: Negative for visual disturbance.   Respiratory: Positive for apnea (on CPAP). Negative for cough, shortness of breath and wheezing.    Cardiovascular: Negative for chest pain, palpitations and leg swelling.    Gastrointestinal: Positive for constipation. Negative for diarrhea, nausea and vomiting.   Genitourinary: Negative for difficulty urinating, dysuria, frequency and hematuria.   Musculoskeletal: Negative for arthralgias, back pain and joint swelling.   Skin: Positive for wound.   Neurological: Negative for dizziness, weakness, light-headedness and headaches.   Hematological: Bruises/bleeds easily.   Psychiatric/Behavioral: Positive for dysphoric mood and sleep disturbance. Negative for confusion and decreased concentration. The patient is nervous/anxious.        Objective:      Physical Exam  Vitals signs and nursing note reviewed.   Constitutional:       General: She is not in acute distress.     Appearance: She is well-developed. She is not diaphoretic.   HENT:      Head: Normocephalic and atraumatic.   Pulmonary:      Effort: Pulmonary effort is normal. No respiratory distress.   Musculoskeletal:         General: No tenderness.        Legs:    Skin:     General: Skin is warm and dry.      Findings: No erythema or rash.      Nails: There is no clubbing.     Neurological:      Mental Status: She is alert and oriented to person, place, and time.   Psychiatric:         Behavior: Behavior normal.         Thought Content: Thought content normal.         Judgment: Judgment normal.         Left groin          Assessment:       1. Wound dehiscence               Plan:            Cleanse wound with wound cleanser.  Skin prep to periwound area.  Apply medihoney gel daily to wound cover with cotton gauze and secure with a mepore island dressing.  Written and verbal instructions given to patient.  [facility] notified of orders via email  We will ask them to see the patient weekly.  Return to clinic in one month or prn if healed.

## 2020-11-18 NOTE — PATIENT INSTRUCTIONS
"You may shower using a mild soap such as Dove.  Irrigate the wound with lukewarm water for 5 minutes and dry thoroughly.  Apply medihoney gel to the wound, cover with cotton gauze and secure with paper tape or an island dressing.  Use two 4" ace wraps for compression.  Change dressing daily.  Report any signs of infection.    You may purchase your supplies including the medihoney gel from [employer].  [address] [place] [address]  [phone]  "

## 2020-11-19 ENCOUNTER — DOCUMENT SCAN (OUTPATIENT)
Dept: HOME HEALTH SERVICES | Facility: HOSPITAL | Age: 55
End: 2020-11-19
Payer: COMMERCIAL

## 2020-11-19 ENCOUNTER — TELEPHONE (OUTPATIENT)
Dept: VASCULAR SURGERY | Facility: CLINIC | Age: 55
End: 2020-11-19

## 2020-11-19 NOTE — TELEPHONE ENCOUNTER
Contacted patient to notify her that Dr. Child will sign RTW forms for her but was unable to do this today as he was in the OR for the day. Will bring to office in AM and will contact patient to . Pt states this will be fine.

## 2020-11-20 ENCOUNTER — TELEPHONE (OUTPATIENT)
Dept: VASCULAR SURGERY | Facility: CLINIC | Age: 55
End: 2020-11-20

## 2020-11-20 ENCOUNTER — PATIENT MESSAGE (OUTPATIENT)
Dept: VASCULAR SURGERY | Facility: CLINIC | Age: 55
End: 2020-11-20

## 2020-11-20 ENCOUNTER — DOCUMENT SCAN (OUTPATIENT)
Dept: HOME HEALTH SERVICES | Facility: HOSPITAL | Age: 55
End: 2020-11-20
Payer: COMMERCIAL

## 2020-11-20 RX ORDER — HYDROCODONE BITARTRATE AND ACETAMINOPHEN 5; 325 MG/1; MG/1
1 TABLET ORAL EVERY 6 HOURS PRN
Qty: 30 TABLET | Refills: 0 | Status: CANCELLED | OUTPATIENT
Start: 2020-11-20

## 2020-11-20 RX ORDER — HYDROCODONE BITARTRATE AND ACETAMINOPHEN 5; 325 MG/1; MG/1
1 TABLET ORAL EVERY 6 HOURS PRN
Qty: 30 TABLET | Refills: 0 | Status: SHIPPED | OUTPATIENT
Start: 2020-11-20 | End: 2021-01-29

## 2020-11-20 NOTE — TELEPHONE ENCOUNTER
Contacted patient to notify her that Dr. Child has ordered her pain medication and that she can pick this up at the [facility]. Also notified her that if she continues to need pain medication she may need to contact her PCP as she is being flagged by Epic as a chronic pain patient. Pt verbalized understanding. States she will  medication and that she should not need any after this prescription as it is only occasional.----- Message from Gerson Child MD sent at 11/20/2020  4:36 PM CST -----  Reordered her norco to [facility].  If she continues to have pain, likely needs PCP to manage or chronic pain specialist. System is flagging her as a chronic pain person    ----- Message -----  From: Ngoc Snow RN  Sent: 11/20/2020   4:09 PM CST  To: Gerson Child MD    She would also like to look at having Norco rather than Percocet. She is going back to work Monday, but at times she does need something for pain. A prescription was sent in for her, but for some reason when the authorization was sent back it was sent to wound care. The wound care NP denied it because she cannot Rx narcotics. She thinks the Percocet is too heavy and would like to have something to take if needed when she is not at work.  Ngoc  ----- Message -----  From: Gerson Child MD  Sent: 11/19/2020   9:30 AM CST  To: Ngoc Snow RN    Sure, I'll be in OR 11 all day    Neeraj    ----- Message -----  From: Ngoc Snow RN  Sent: 11/19/2020   8:57 AM CST  To: Gerson Child MD    Neeraj,    Can I bring the RTW forms to you for Mrs. Alvarado? They're already filled out, she just needs a signature.    Ngoc  ----- Message -----  From: Uriel Conner MD  Sent: 11/18/2020   5:13 PM CST  To: Ngoc Snow RN    Fine be me if she wants to go back to work!  I will not be here tomorrow, though.  Maybe Neeraj can sign.    T  ----- Message -----  From: Ngoc Snow RN  Sent: 11/18/2020   4:02 PM CST  To: MD Malcolm Christianson Mrs.  Christiano was seen in wound care today and is wanting to return to work this coming Monday. She has a form here filled out with all of her info and restrictions, but she needs a signature. Can you take a look and sign this for her, or does this have to be signed by the attending for some reason? If you can handle it just let me know and I'll bring it to your office some time Thursday when you're there.  Ngoc

## 2020-11-20 NOTE — TELEPHONE ENCOUNTER
Attempted to contact patient in response to message. Voice message left for patient requesting return call if needed.----- Message from Bonita Rob sent at 11/20/2020  3:06 PM CST -----  Regarding: PT  Contact: PT  PT returned a missed call from Ngoc. Please call back     Callback: [phone]

## 2020-11-23 ENCOUNTER — PATIENT MESSAGE (OUTPATIENT)
Dept: VASCULAR SURGERY | Facility: CLINIC | Age: 55
End: 2020-11-23

## 2020-11-23 ENCOUNTER — TELEPHONE (OUTPATIENT)
Dept: VASCULAR SURGERY | Facility: CLINIC | Age: 55
End: 2020-11-23

## 2020-11-23 RX ORDER — HYDROCODONE BITARTRATE AND ACETAMINOPHEN 5; 325 MG/1; MG/1
1 TABLET ORAL EVERY 6 HOURS PRN
Qty: 30 TABLET | Refills: 0 | OUTPATIENT
Start: 2020-11-23

## 2020-11-23 NOTE — TELEPHONE ENCOUNTER
Attempted to contact patient to discuss reason for pain medication not being ordered. Voice message left for patient requesting return call.

## 2020-11-23 NOTE — TELEPHONE ENCOUNTER
Contacted patient with Dr. Child's response. Pt verbalized understanding and states she will contact her PCP.----- Message from Gerson Child MD sent at 11/20/2020  5:26 PM CST -----  Ok, the reoder kicked back.  It wont let me order her more narcotics because we haven't seen her in the office since august (Dr. Rose not wound care) and she hasn't signed a pain contract.  She'll have to come in to have that done.  May be better off getting a PCP or pain specialist for this    ----- Message -----  From: Ngoc Snow RN  Sent: 11/20/2020   4:44 PM CST  To: Gerson Child MD    Thank you Neeraj!!  ----- Message -----  From: Gerson Child MD  Sent: 11/20/2020   4:36 PM CST  To: Ngoc Snow RN    Reordered her norco to [facility].  If she continues to have pain, likely needs PCP to manage or chronic pain specialist. System is flagging her as a chronic pain person    ----- Message -----  From: Ngoc Snow RN  Sent: 11/20/2020   4:09 PM CST  To: Gerson Child MD    She would also like to look at having Norco rather than Percocet. She is going back to work Monday, but at times she does need something for pain. A prescription was sent in for her, but for some reason when the authorization was sent back it was sent to wound care. The wound care NP denied it because she cannot Rx narcotics. She thinks the Percocet is too heavy and would like to have something to take if needed when she is not at work.  Ngoc  ----- Message -----  From: Gerson Child MD  Sent: 11/19/2020   9:30 AM CST  To: Ngoc Snow RN    Sure, I'll be in OR 11 all day    Neeraj    ----- Message -----  From: Ngoc Snow RN  Sent: 11/19/2020   8:57 AM CST  To: Gerson Child MD    Neeraj,    Can I bring the RTW forms to you for Mrs. Alvarado? They're already filled out, she just needs a signature.    Ngoc  ----- Message -----  From: Uriel Conner MD  Sent: 11/18/2020   5:13 PM CST  To: Ngoc Snow RN    Fine be me if she wants  to go back to work!  I will not be here tomorrow, though.  Maybe Neeraj can sign.    T  ----- Message -----  From: Ngoc Snow RN  Sent: 11/18/2020   4:02 PM CST  To: Uriel Conner MD    Malcolm,    Mrs. Alvarado was seen in wound care today and is wanting to return to work this coming Monday. She has a form here filled out with all of her info and restrictions, but she needs a signature. Can you take a look and sign this for her, or does this have to be signed by the attending for some reason? If you can handle it just let me know and I'll bring it to your office some time Thursday when you're there.  Ngoc

## 2020-12-07 ENCOUNTER — TELEPHONE (OUTPATIENT)
Dept: SLEEP MEDICINE | Facility: CLINIC | Age: 55
End: 2020-12-07

## 2020-12-07 DIAGNOSIS — G47.33 OSA (OBSTRUCTIVE SLEEP APNEA): Primary | ICD-10-CM

## 2020-12-07 NOTE — TELEPHONE ENCOUNTER
Returned pt's call regarding her mask. Pt stated that she would like to to exchange her mask for another one. Would like to know if you can put in another order for a new mask for pt? Please advise.

## 2020-12-07 NOTE — TELEPHONE ENCOUNTER
----- Message from Sommer Navas sent at 12/7/2020 11:13 AM CST -----  Patient would like to get a call back regarding Cpap machine      [phone]

## 2020-12-08 ENCOUNTER — TELEPHONE (OUTPATIENT)
Dept: SLEEP MEDICINE | Facility: CLINIC | Age: 55
End: 2020-12-08

## 2020-12-08 NOTE — TELEPHONE ENCOUNTER
Called pt to inform her that  had placed the order for her new mask and that she can contact [facility] in [place] at [phone] to schedule an appointment for mask fitting.

## 2020-12-17 ENCOUNTER — TELEPHONE (OUTPATIENT)
Dept: SLEEP MEDICINE | Facility: CLINIC | Age: 55
End: 2020-12-17

## 2020-12-17 NOTE — TELEPHONE ENCOUNTER
Visit Type: ESTABLISHED PATIENT - VIRTUAL ([id number])      1/4/2021     2:30 PM  30 mins.  Gunjan Doty MD      [facility]      Patient Comments:   follow up

## 2020-12-21 ENCOUNTER — IMMUNIZATION (OUTPATIENT)
Dept: INTERNAL MEDICINE | Facility: CLINIC | Age: 55
End: 2020-12-21
Payer: COMMERCIAL

## 2020-12-21 DIAGNOSIS — Z23 NEED FOR VACCINATION: ICD-10-CM

## 2020-12-21 PROCEDURE — 91300 COVID-19, MRNA, LNP-S, PF, 30 MCG/0.3 ML DOSE VACCINE: CPT | Mod: ,,, | Performed by: INTERNAL MEDICINE

## 2020-12-21 PROCEDURE — 0001A COVID-19, MRNA, LNP-S, PF, 30 MCG/0.3 ML DOSE VACCINE: ICD-10-PCS | Mod: CV19,,, | Performed by: INTERNAL MEDICINE

## 2020-12-21 PROCEDURE — 0001A COVID-19, MRNA, LNP-S, PF, 30 MCG/0.3 ML DOSE VACCINE: CPT | Mod: CV19,,, | Performed by: INTERNAL MEDICINE

## 2020-12-21 PROCEDURE — 91300 COVID-19, MRNA, LNP-S, PF, 30 MCG/0.3 ML DOSE VACCINE: ICD-10-PCS | Mod: ,,, | Performed by: INTERNAL MEDICINE

## 2020-12-23 ENCOUNTER — OFFICE VISIT (OUTPATIENT)
Dept: WOUND CARE | Facility: CLINIC | Age: 55
End: 2020-12-23
Payer: COMMERCIAL

## 2020-12-23 VITALS
WEIGHT: 293 LBS | SYSTOLIC BLOOD PRESSURE: 113 MMHG | BODY MASS INDEX: 47.09 KG/M2 | DIASTOLIC BLOOD PRESSURE: 78 MMHG | HEIGHT: 66 IN | TEMPERATURE: 97 F | HEART RATE: 75 BPM | RESPIRATION RATE: 18 BRPM

## 2020-12-23 DIAGNOSIS — T81.30XA WOUND DEHISCENCE: Primary | ICD-10-CM

## 2020-12-23 PROCEDURE — 99213 OFFICE O/P EST LOW 20 MIN: CPT | Mod: S$GLB,,, | Performed by: NURSE PRACTITIONER

## 2020-12-23 PROCEDURE — 3078F PR MOST RECENT DIASTOLIC BLOOD PRESSURE < 80 MM HG: ICD-10-PCS | Mod: CPTII,S$GLB,, | Performed by: NURSE PRACTITIONER

## 2020-12-23 PROCEDURE — 1126F PR PAIN SEVERITY QUANTIFIED, NO PAIN PRESENT: ICD-10-PCS | Mod: S$GLB,,, | Performed by: NURSE PRACTITIONER

## 2020-12-23 PROCEDURE — 99999 PR PBB SHADOW E&M-EST. PATIENT-LVL V: ICD-10-PCS | Mod: PBBFAC,,, | Performed by: NURSE PRACTITIONER

## 2020-12-23 PROCEDURE — 99999 PR PBB SHADOW E&M-EST. PATIENT-LVL V: CPT | Mod: PBBFAC,,, | Performed by: NURSE PRACTITIONER

## 2020-12-23 PROCEDURE — 1126F AMNT PAIN NOTED NONE PRSNT: CPT | Mod: S$GLB,,, | Performed by: NURSE PRACTITIONER

## 2020-12-23 PROCEDURE — 3008F BODY MASS INDEX DOCD: CPT | Mod: CPTII,S$GLB,, | Performed by: NURSE PRACTITIONER

## 2020-12-23 PROCEDURE — 3078F DIAST BP <80 MM HG: CPT | Mod: CPTII,S$GLB,, | Performed by: NURSE PRACTITIONER

## 2020-12-23 PROCEDURE — 3074F PR MOST RECENT SYSTOLIC BLOOD PRESSURE < 130 MM HG: ICD-10-PCS | Mod: CPTII,S$GLB,, | Performed by: NURSE PRACTITIONER

## 2020-12-23 PROCEDURE — 3008F PR BODY MASS INDEX (BMI) DOCUMENTED: ICD-10-PCS | Mod: CPTII,S$GLB,, | Performed by: NURSE PRACTITIONER

## 2020-12-23 PROCEDURE — 3074F SYST BP LT 130 MM HG: CPT | Mod: CPTII,S$GLB,, | Performed by: NURSE PRACTITIONER

## 2020-12-23 PROCEDURE — 99213 PR OFFICE/OUTPT VISIT, EST, LEVL III, 20-29 MIN: ICD-10-PCS | Mod: S$GLB,,, | Performed by: NURSE PRACTITIONER

## 2020-12-23 NOTE — PROGRESS NOTES
Subjective:       Patient ID: Vicky Alvarado is a 55 y.o. female.    Chief Complaint: Wound Check    Wound Check  This is a 55 year old female referred by Dr. Márquez for evaluation and management of a surgical wound to the left groin.  She underwent an urgent repair of the left SFA branch for a laceration on 7/21/20 with Dr. Márquez.  She then had a wound vac placed when the wound dehisced on 8/3/20 with Dr. Márquez. She developed a hematoma and underwent a left groin washout and application of wound vac on 8/6/20 with Dr. Márquez.  She is seen today for reevaluation of this wound. Hydrofiber is being used to dress the wound and the wound has delayed healing. She is afebrile. She denies increased redness, swelling or purulent drainage.  She does not complain of pain.    Patient Active Problem List   Diagnosis    Opiate dependence    Opioid dependence in remission    Hypertension    Chronic diastolic heart failure    Atrial Fibrillation (paroxysmal)    Cardiomyopathy    Avascular necrosis of lunate    Body mass index (BMI) 45.0-49.9, adult    History of opioid abuse    Severe obesity (BMI >= 40)    Pre-diabetes    Right carpal tunnel syndrome    Weakness of right hand    ERENDIRA (obstructive sleep apnea)    Atrial fibrillation    Depression    Wound dehiscence     Past Medical History:   Diagnosis Date    Atrial fibrillation     cardioversion    Avascular necrosis     L hand    Depression     GERD (gastroesophageal reflux disease)     Hx of psychiatric care     Hypertension     Psychiatric problem      Past Surgical History:   Procedure Laterality Date    ABLATION OF ARRHYTHMOGENIC FOCUS FOR ATRIAL FIBRILLATION N/A 7/20/2020    Procedure: Ablation atrial fibrillation;  Surgeon: Gabriel Hawley MD;  Location: [facility] EP LAB;  Service: Cardiology;  Laterality: N/A;  afib, PVI, RFA, REENA, SHADY, anes, MB, 3 Prep    APPLICATION OF WOUND VACUUM-ASSISTED CLOSURE DEVICE Left 8/3/2020     Procedure: APPLICATION, WOUND VAC;  Surgeon: SEMAJ Márquez III, MD;  [facility];  Service: Peripheral Vascular;  Laterality: Left;    APPLICATION OF WOUND VACUUM-ASSISTED CLOSURE DEVICE Left 8/6/2020    Procedure: APPLICATION, WOUND VAC;  Surgeon: SEMAJ Márquez III, MD;  [facility];  Service: Peripheral Vascular;  Laterality: Left;    CARPAL TUNNEL RELEASE Right 6/10/2020    Procedure: RELEASE, CARPAL TUNNEL - RIGHT;  Surgeon: Adelaida Hall MD;  Location: [facility] OR;  Service: Orthopedics;  Laterality: Right;  GENERAL AND REGIONAL    CLOSURE OF WOUND Left 8/6/2020    Procedure: CLOSURE, WOUND;  Surgeon: SEMAJ Márquez III, MD;  Location: [facility] OR [address] [place] [address];  Service: Peripheral Vascular;  Laterality: Left;  Complex    EXPLORATION OF FEMORAL ARTERY Left 7/21/2020    Procedure: EXPLORATION, ARTERY, FEMORAL;  Surgeon: SEMAJ Márquez III, MD;  Location: [facility] OR [address] [place] [address];  Service: Peripheral Vascular;  Laterality: Left;  1. Urgent Direct repair, L SFA branch laceration    FOOT SURGERY      TREATMENT OF CARDIAC ARRHYTHMIA N/A 1/29/2020    Procedure: CARDIOVERSION;  Surgeon: Gabriel Hawley MD;  Location: [facility] EP LAB;  Service: Cardiology;  Laterality: N/A;  af, demi, dccv, anes, mb, 345    WOUND DEBRIDEMENT Left 8/6/2020    Procedure: DEBRIDEMENT, WOUND;  Surgeon: SEMAJ Márquez III, MD;  Location: [address];  Service: Peripheral Vascular;  Laterality: Left;     Review of Systems   Constitutional: Negative for chills, diaphoresis and fever.   HENT: Negative for hearing loss, postnasal drip, rhinorrhea, sinus pressure, sneezing, sore throat, tinnitus and trouble swallowing.    Eyes: Negative for visual disturbance.   Respiratory: Positive for apnea (on CPAP). Negative for cough, shortness of breath and wheezing.    Cardiovascular: Negative for chest pain, palpitations and leg swelling.   Gastrointestinal: Positive for constipation. Negative for diarrhea, nausea and  vomiting.   Genitourinary: Negative for difficulty urinating, dysuria, frequency and hematuria.   Musculoskeletal: Negative for arthralgias, back pain and joint swelling.   Skin: Positive for wound.   Neurological: Negative for dizziness, weakness, light-headedness and headaches.   Hematological: Bruises/bleeds easily.   Psychiatric/Behavioral: Positive for dysphoric mood and sleep disturbance. Negative for confusion and decreased concentration. The patient is nervous/anxious.        Objective:      Physical Exam  Vitals signs and nursing note reviewed.   Constitutional:       General: She is not in acute distress.     Appearance: She is well-developed. She is not diaphoretic.   HENT:      Head: Normocephalic and atraumatic.   Pulmonary:      Effort: Pulmonary effort is normal. No respiratory distress.   Musculoskeletal:         General: No tenderness.        Legs:    Skin:     General: Skin is warm and dry.      Findings: No erythema or rash.      Nails: There is no clubbing.     Neurological:      Mental Status: She is alert and oriented to person, place, and time.   Psychiatric:         Behavior: Behavior normal.         Thought Content: Thought content normal.         Judgment: Judgment normal.         Left groin      Assessment:       1. Wound dehiscence               Plan:            Cleanse wound with wound cleanser.  Skin prep to periwound area.  Apply medihoney gel daily to wound cover with cotton gauze and secure with a mepore island dressing.  Written and verbal instructions given to patient.  Return to clinic in one month or prn if healed.

## 2020-12-23 NOTE — PATIENT INSTRUCTIONS
Cleanse wound with wound cleanser.  Skin prep to periwound area.  Apply medihoney gel daily to wound cover with cotton gauze and secure with a mepore island dressing.

## 2021-01-04 ENCOUNTER — OFFICE VISIT (OUTPATIENT)
Dept: SLEEP MEDICINE | Facility: CLINIC | Age: 56
End: 2021-01-04
Payer: COMMERCIAL

## 2021-01-04 DIAGNOSIS — G47.33 OSA (OBSTRUCTIVE SLEEP APNEA): Primary | ICD-10-CM

## 2021-01-04 PROCEDURE — 99213 OFFICE O/P EST LOW 20 MIN: CPT | Mod: 95,,, | Performed by: PSYCHIATRY & NEUROLOGY

## 2021-01-04 PROCEDURE — 99213 PR OFFICE/OUTPT VISIT, EST, LEVL III, 20-29 MIN: ICD-10-PCS | Mod: 95,,, | Performed by: PSYCHIATRY & NEUROLOGY

## 2021-01-06 ENCOUNTER — PATIENT MESSAGE (OUTPATIENT)
Dept: ELECTROPHYSIOLOGY | Facility: CLINIC | Age: 56
End: 2021-01-06

## 2021-01-06 ENCOUNTER — HOSPITAL ENCOUNTER (EMERGENCY)
Facility: HOSPITAL | Age: 56
Discharge: HOME | End: 2021-01-06
Attending: EMERGENCY MEDICINE
Payer: COMMERCIAL

## 2021-01-06 VITALS
HEART RATE: 63 BPM | WEIGHT: 293 LBS | BODY MASS INDEX: 47.09 KG/M2 | OXYGEN SATURATION: 98 % | SYSTOLIC BLOOD PRESSURE: 92 MMHG | HEIGHT: 66 IN | TEMPERATURE: 99 F | RESPIRATION RATE: 23 BRPM | DIASTOLIC BLOOD PRESSURE: 50 MMHG

## 2021-01-06 DIAGNOSIS — I48.91 A-FIB: ICD-10-CM

## 2021-01-06 DIAGNOSIS — I48.91 ATRIAL FIBRILLATION WITH RVR: ICD-10-CM

## 2021-01-06 LAB
ALBUMIN SERPL BCP-MCNC: 3.9 G/DL (ref 3.5–5.2)
ALP SERPL-CCNC: 110 U/L (ref 55–135)
ALT SERPL W/O P-5'-P-CCNC: 21 U/L (ref 10–44)
ANION GAP SERPL CALC-SCNC: 12 MMOL/L (ref 8–16)
AST SERPL-CCNC: 38 U/L (ref 10–40)
BASOPHILS # BLD AUTO: 0.07 K/UL (ref 0–0.2)
BASOPHILS NFR BLD: 0.7 % (ref 0–1.9)
BILIRUB SERPL-MCNC: 0.5 MG/DL (ref 0.1–1)
BNP SERPL-MCNC: 86 PG/ML (ref 0–99)
BUN SERPL-MCNC: 27 MG/DL (ref 6–20)
CALCIUM SERPL-MCNC: 10.1 MG/DL (ref 8.7–10.5)
CHLORIDE SERPL-SCNC: 101 MMOL/L (ref 95–110)
CO2 SERPL-SCNC: 24 MMOL/L (ref 23–29)
CREAT SERPL-MCNC: 0.9 MG/DL (ref 0.5–1.4)
DIFFERENTIAL METHOD: ABNORMAL
EOSINOPHIL # BLD AUTO: 0.2 K/UL (ref 0–0.5)
EOSINOPHIL NFR BLD: 1.9 % (ref 0–8)
ERYTHROCYTE [DISTWIDTH] IN BLOOD BY AUTOMATED COUNT: 20.2 % (ref 11.5–14.5)
EST. GFR  (AFRICAN AMERICAN): >60 ML/MIN/1.73 M^2
EST. GFR  (NON AFRICAN AMERICAN): >60 ML/MIN/1.73 M^2
GLUCOSE SERPL-MCNC: 132 MG/DL (ref 70–110)
HCT VFR BLD AUTO: 41.6 % (ref 37–48.5)
HGB BLD-MCNC: 12.5 G/DL (ref 12–16)
IMM GRANULOCYTES # BLD AUTO: 0.04 K/UL (ref 0–0.04)
IMM GRANULOCYTES NFR BLD AUTO: 0.4 % (ref 0–0.5)
LYMPHOCYTES # BLD AUTO: 1.2 K/UL (ref 1–4.8)
LYMPHOCYTES NFR BLD: 12.9 % (ref 18–48)
MCH RBC QN AUTO: 23.4 PG (ref 27–31)
MCHC RBC AUTO-ENTMCNC: 30 G/DL (ref 32–36)
MCV RBC AUTO: 78 FL (ref 82–98)
MONOCYTES # BLD AUTO: 0.7 K/UL (ref 0.3–1)
MONOCYTES NFR BLD: 7.6 % (ref 4–15)
NEUTROPHILS # BLD AUTO: 7.4 K/UL (ref 1.8–7.7)
NEUTROPHILS NFR BLD: 76.5 % (ref 38–73)
NRBC BLD-RTO: 0 /100 WBC
PLATELET # BLD AUTO: 320 K/UL (ref 150–350)
PMV BLD AUTO: 11.1 FL (ref 9.2–12.9)
POTASSIUM SERPL-SCNC: 4.4 MMOL/L (ref 3.5–5.1)
PROT SERPL-MCNC: 8.2 G/DL (ref 6–8.4)
RBC # BLD AUTO: 5.34 M/UL (ref 4–5.4)
SODIUM SERPL-SCNC: 137 MMOL/L (ref 136–145)
TROPONIN I SERPL DL<=0.01 NG/ML-MCNC: 0.02 NG/ML (ref 0–0.03)
WBC # BLD AUTO: 9.62 K/UL (ref 3.9–12.7)

## 2021-01-06 PROCEDURE — 85025 COMPLETE CBC W/AUTO DIFF WBC: CPT

## 2021-01-06 PROCEDURE — 96374 THER/PROPH/DIAG INJ IV PUSH: CPT

## 2021-01-06 PROCEDURE — 99291 CRITICAL CARE FIRST HOUR: CPT | Mod: 25

## 2021-01-06 PROCEDURE — 80053 COMPREHEN METABOLIC PANEL: CPT

## 2021-01-06 PROCEDURE — 93010 ELECTROCARDIOGRAM REPORT: CPT | Mod: ,,, | Performed by: INTERNAL MEDICINE

## 2021-01-06 PROCEDURE — 83880 ASSAY OF NATRIURETIC PEPTIDE: CPT

## 2021-01-06 PROCEDURE — 93010 EKG 12-LEAD: ICD-10-PCS | Mod: ,,, | Performed by: INTERNAL MEDICINE

## 2021-01-06 PROCEDURE — 99291 CRITICAL CARE FIRST HOUR: CPT | Mod: ,,, | Performed by: EMERGENCY MEDICINE

## 2021-01-06 PROCEDURE — 84484 ASSAY OF TROPONIN QUANT: CPT

## 2021-01-06 PROCEDURE — 25000003 PHARM REV CODE 250: Performed by: EMERGENCY MEDICINE

## 2021-01-06 PROCEDURE — 99291 PR CRITICAL CARE, E/M 30-74 MINUTES: ICD-10-PCS | Mod: ,,, | Performed by: EMERGENCY MEDICINE

## 2021-01-06 PROCEDURE — 93005 ELECTROCARDIOGRAM TRACING: CPT

## 2021-01-06 RX ORDER — METOPROLOL TARTRATE 1 MG/ML
5 INJECTION, SOLUTION INTRAVENOUS
Status: COMPLETED | OUTPATIENT
Start: 2021-01-06 | End: 2021-01-06

## 2021-01-06 RX ORDER — ALPRAZOLAM 0.25 MG/1
0.25 TABLET ORAL
Status: COMPLETED | OUTPATIENT
Start: 2021-01-06 | End: 2021-01-06

## 2021-01-06 RX ADMIN — ALPRAZOLAM 0.25 MG: 0.25 TABLET ORAL at 07:01

## 2021-01-06 RX ADMIN — METOROPROLOL TARTRATE 5 MG: 5 INJECTION, SOLUTION INTRAVENOUS at 06:01

## 2021-01-07 ENCOUNTER — PATIENT MESSAGE (OUTPATIENT)
Dept: ELECTROPHYSIOLOGY | Facility: CLINIC | Age: 56
End: 2021-01-07

## 2021-01-08 ENCOUNTER — PATIENT MESSAGE (OUTPATIENT)
Dept: ELECTROPHYSIOLOGY | Facility: CLINIC | Age: 56
End: 2021-01-08

## 2021-01-10 NOTE — PROGRESS NOTES
01/10/21 0800   Mental Status   Mental Status Assessment X   Affect Anxious;Depressed   Insight Poor   Judgement Poor   Reliability Appears to minimize   Mood Anxious   Nursing Suicide Assessment Note - Inpatient    Current assessment:    Current C-SSRS score: Negative screen= no ideation, behaviors or history      Protective Factors / Reason for Living: Other(\"i want to go to college\")    Interventions:   Maintain current plan of care.    Perform 15 minute safety checks, maintain suicide precautions.  Patient seen resting in bed and eating breakfast during morning assessment. Patient took scheduled medications without difficulty. Patient continues to report anxiety but reports it is manageable. Patient declines needing prn medications or interventions at this time. Patient denies suicidal thoughts and homicidal thoughts. Patient polite and cooperative but guarded in conversation. Patient denies any further needs or questions at this time.  Writer will continue to monitor.      Subjective:      Patient ID: Vicky Alvarado is a 55 y.o. female.    Chief Complaint: Pain and Swelling of the Right Elbow      HPI  Vicky Alvarado is a 55 y.o. female presenting today for right elbow pain. Onset around 1/2020, denies injury. Pain is located over the lateral epicondyle, sometimes radiates into the forearm. It is increased with use or pressure to the area. She has tried a tennis elbow strap without notable improvement. Pt also reports intermittent numbness and tingling in the right hand, this has been present at least a few years, she previously saw Dr. Hall for this and an EMG was obtained which revealed carpal tunnel. Pt reports the whole hand goes numb. In addition, pt has know Kienbock's of the left wrist with occasional pain.       Review of patient's allergies indicates:  No Known Allergies      Current Outpatient Medications   Medication Sig Dispense Refill    apixaban (ELIQUIS) 5 mg Tab Take 1 tablet (5 mg total) by mouth 2 (two) times daily. 60 tablet 3    cyanocobalamin (VITAMIN B-12) 1000 MCG tablet Take 100 mcg by mouth once daily.       DULoxetine (CYMBALTA) 60 MG capsule Take 2 capsules (120 mg total) by mouth once daily. 180 capsule 3    gluc-shikha-msm#9-I-gwxn-reymundo-bor 750 mg-644 mg- 30 mg-1 mg Tab Take 1 tablet by mouth once daily.       hydroCHLOROthiazide (HYDRODIURIL) 50 MG tablet Take 1 tablet (50 mg total) by mouth once daily. 90 tablet 5    lisinopriL (PRINIVIL,ZESTRIL) 40 MG tablet Take 1 tablet (40 mg total) by mouth once daily. 90 tablet 5    MELATONIN ORAL Take 1-2 tablets by mouth nightly as needed (Sleep).      metoprolol succinate (TOPROL-XL) 50 MG 24 hr tablet Take 2 tablets (100 mg total) by mouth once daily. 30 tablet 5    multivitamin (THERAGRAN) per tablet Take 1 tablet by mouth once daily.      omega-3 fatty acids-vitamin E 1,000 mg Cap       omeprazole (PRILOSEC) 20 MG capsule TAKE 1 CAPSULE BY MOUTH ONCE DAILY 90 capsule 3    traZODone  "(DESYREL) 100 MG tablet Take 1 tablet (100 mg total) by mouth nightly as needed for Insomnia. 90 tablet 3     No current facility-administered medications for this visit.        Past Medical History:   Diagnosis Date    Depression     Hx of psychiatric care     Psychiatric problem        Past Surgical History:   Procedure Laterality Date    TREATMENT OF CARDIAC ARRHYTHMIA N/A 1/29/2020    Procedure: CARDIOVERSION;  Surgeon: Gabriel Hawley MD;  Location: [facility] EP LAB;  Service: Cardiology;  Laterality: N/A;  af, demi, dccv, anes, mb, 345       Review of Systems:  Constitutional: Negative for chills and fever.   Respiratory: Negative for cough and shortness of breath.    Gastrointestinal: Negative for nausea and vomiting.   Skin: Negative for rash.   Neurological: Negative for dizziness and headaches.   Psychiatric/Behavioral: Negative for depression.   MSK as in HPI       OBJECTIVE:     PHYSICAL EXAM:  /79 (BP Location: Left arm, Patient Position: Sitting, BP Method: Large (Automatic))   Pulse 67   Ht 5' 6" (1.676 m)   Wt 131.5 kg (290 lb)   LMP 06/08/2015   BMI 46.81 kg/m²     GEN:  NAD, well-developed, well-groomed.  NEURO: Awake, alert, and oriented. Normal attention and concentration.    PSYCH: Normal mood and affect. Behavior is normal.  HEENT: No cervical lymphadenopathy noted.  CARDIOVASCULAR: Radial pulses 2+ bilaterally. No LE edema noted.  PULMONARY: Breath sounds normal. No respiratory distress.  SKIN: Intact, no rashes.      MSK:   BUE:  Good active ROM of the wrist and fingers. ttp over the right lateral epicondyle, elbow pain with resisted wrist extension. Positive tinels at the cubital and carpal tunnels on the right. Mild ttp over the left wrist.  AIN/PIN/Radial/Median/Ulnar Nerves assessed in isolation without deficit. Radial & Ulnar arteries palpated 2+. Capillary Refill <3s.      RADIOGRAPHS:  Xray right elbow 3/20/20  FINDINGS:  Mild DJD.  No fracture or dislocation.  No bone " destruction identified    Xray right wrist 3/20/20  FINDINGS:  Mild DJD.  Small bone island in the distal radius.  No fracture or dislocation.  No bone destruction identified    Comments: I have personally reviewed the imaging and I agree with the above radiologist's report.    ASSESSMENT/PLAN:       ICD-10-CM ICD-9-CM   1. Right tennis elbow M77.11 726.32   2. Carpal tunnel syndrome on right G56.01 354.0   3. Cubital tunnel syndrome on right G56.21 354.2   4. Kienbock's disease, left M92.212 732.3       Orders Placed This Encounter    X-Ray Wrist Complete Left    Ambulatory referral/consult to Physical/Occupational Therapy    EMG W/ ULTRASOUND AND NERVE CONDUCTION TEST 2 Extremities     Plan:   -right tennis elbow- continue alysa strap and OT ordered  -right carpal tunnel, possible cubital tunnel- new EMG   -left kienbocks- new left wrist xray   -RTC after above       The patient indicates understanding of these issues and agrees to the plan.    Echo Aguirre PA-C  [facility]   [facility] [place]

## 2021-01-11 ENCOUNTER — IMMUNIZATION (OUTPATIENT)
Dept: INTERNAL MEDICINE | Facility: CLINIC | Age: 56
End: 2021-01-11
Payer: COMMERCIAL

## 2021-01-11 DIAGNOSIS — Z23 NEED FOR VACCINATION: ICD-10-CM

## 2021-01-11 PROCEDURE — 0002A COVID-19, MRNA, LNP-S, PF, 30 MCG/0.3 ML DOSE VACCINE: CPT | Mod: PBBFAC | Performed by: INTERNAL MEDICINE

## 2021-01-11 PROCEDURE — 91300 COVID-19, MRNA, LNP-S, PF, 30 MCG/0.3 ML DOSE VACCINE: CPT | Mod: PBBFAC | Performed by: INTERNAL MEDICINE

## 2021-01-15 ENCOUNTER — TELEPHONE (OUTPATIENT)
Dept: ORTHOPEDICS | Facility: CLINIC | Age: 56
End: 2021-01-15

## 2021-01-22 ENCOUNTER — OFFICE VISIT (OUTPATIENT)
Dept: WOUND CARE | Facility: CLINIC | Age: 56
End: 2021-01-22
Payer: COMMERCIAL

## 2021-01-22 VITALS
HEART RATE: 77 BPM | SYSTOLIC BLOOD PRESSURE: 126 MMHG | TEMPERATURE: 98 F | BODY MASS INDEX: 47.09 KG/M2 | DIASTOLIC BLOOD PRESSURE: 80 MMHG | WEIGHT: 293 LBS | HEIGHT: 66 IN

## 2021-01-22 DIAGNOSIS — T81.30XA WOUND DEHISCENCE: Primary | ICD-10-CM

## 2021-01-22 PROCEDURE — 99213 PR OFFICE/OUTPT VISIT, EST, LEVL III, 20-29 MIN: ICD-10-PCS | Mod: S$GLB,,, | Performed by: NURSE PRACTITIONER

## 2021-01-22 PROCEDURE — 99999 PR PBB SHADOW E&M-EST. PATIENT-LVL V: CPT | Mod: PBBFAC,,, | Performed by: NURSE PRACTITIONER

## 2021-01-22 PROCEDURE — 99999 PR PBB SHADOW E&M-EST. PATIENT-LVL V: ICD-10-PCS | Mod: PBBFAC,,, | Performed by: NURSE PRACTITIONER

## 2021-01-22 PROCEDURE — 1125F PR PAIN SEVERITY QUANTIFIED, PAIN PRESENT: ICD-10-PCS | Mod: S$GLB,,, | Performed by: NURSE PRACTITIONER

## 2021-01-22 PROCEDURE — 3008F BODY MASS INDEX DOCD: CPT | Mod: CPTII,S$GLB,, | Performed by: NURSE PRACTITIONER

## 2021-01-22 PROCEDURE — 1125F AMNT PAIN NOTED PAIN PRSNT: CPT | Mod: S$GLB,,, | Performed by: NURSE PRACTITIONER

## 2021-01-22 PROCEDURE — 3074F PR MOST RECENT SYSTOLIC BLOOD PRESSURE < 130 MM HG: ICD-10-PCS | Mod: CPTII,S$GLB,, | Performed by: NURSE PRACTITIONER

## 2021-01-22 PROCEDURE — 3008F PR BODY MASS INDEX (BMI) DOCUMENTED: ICD-10-PCS | Mod: CPTII,S$GLB,, | Performed by: NURSE PRACTITIONER

## 2021-01-22 PROCEDURE — 3074F SYST BP LT 130 MM HG: CPT | Mod: CPTII,S$GLB,, | Performed by: NURSE PRACTITIONER

## 2021-01-22 PROCEDURE — 3079F PR MOST RECENT DIASTOLIC BLOOD PRESSURE 80-89 MM HG: ICD-10-PCS | Mod: CPTII,S$GLB,, | Performed by: NURSE PRACTITIONER

## 2021-01-22 PROCEDURE — 99213 OFFICE O/P EST LOW 20 MIN: CPT | Mod: S$GLB,,, | Performed by: NURSE PRACTITIONER

## 2021-01-22 PROCEDURE — 3079F DIAST BP 80-89 MM HG: CPT | Mod: CPTII,S$GLB,, | Performed by: NURSE PRACTITIONER

## 2021-02-05 ENCOUNTER — PATIENT MESSAGE (OUTPATIENT)
Dept: SLEEP MEDICINE | Facility: CLINIC | Age: 56
End: 2021-02-05

## 2021-02-05 ENCOUNTER — LAB VISIT (OUTPATIENT)
Dept: LAB | Facility: HOSPITAL | Age: 56
End: 2021-02-05
Payer: COMMERCIAL

## 2021-02-05 DIAGNOSIS — I48.0 PAF (PAROXYSMAL ATRIAL FIBRILLATION): ICD-10-CM

## 2021-02-05 DIAGNOSIS — I10 ESSENTIAL HYPERTENSION: ICD-10-CM

## 2021-02-05 DIAGNOSIS — R73.03 PRE-DIABETES: ICD-10-CM

## 2021-02-05 DIAGNOSIS — G47.33 OBSTRUCTIVE SLEEP APNEA: Primary | ICD-10-CM

## 2021-02-05 LAB
ALBUMIN SERPL BCP-MCNC: 4.2 G/DL (ref 3.5–5.2)
ALP SERPL-CCNC: 107 U/L (ref 55–135)
ALT SERPL W/O P-5'-P-CCNC: 23 U/L (ref 10–44)
ANION GAP SERPL CALC-SCNC: 15 MMOL/L (ref 8–16)
AST SERPL-CCNC: 41 U/L (ref 10–40)
BILIRUB SERPL-MCNC: 0.5 MG/DL (ref 0.1–1)
BUN SERPL-MCNC: 21 MG/DL (ref 6–20)
CALCIUM SERPL-MCNC: 9.9 MG/DL (ref 8.7–10.5)
CHLORIDE SERPL-SCNC: 97 MMOL/L (ref 95–110)
CO2 SERPL-SCNC: 22 MMOL/L (ref 23–29)
CREAT SERPL-MCNC: 1.1 MG/DL (ref 0.5–1.4)
EST. GFR  (AFRICAN AMERICAN): >60 ML/MIN/1.73 M^2
EST. GFR  (NON AFRICAN AMERICAN): 56.3 ML/MIN/1.73 M^2
ESTIMATED AVG GLUCOSE: 131 MG/DL (ref 68–131)
GLUCOSE SERPL-MCNC: 113 MG/DL (ref 70–110)
HBA1C MFR BLD: 6.2 % (ref 4–5.6)
POTASSIUM SERPL-SCNC: 4.8 MMOL/L (ref 3.5–5.1)
PROT SERPL-MCNC: 8.2 G/DL (ref 6–8.4)
SODIUM SERPL-SCNC: 134 MMOL/L (ref 136–145)

## 2021-02-05 PROCEDURE — 80053 COMPREHEN METABOLIC PANEL: CPT

## 2021-02-05 PROCEDURE — 83036 HEMOGLOBIN GLYCOSYLATED A1C: CPT

## 2021-02-05 PROCEDURE — 36415 COLL VENOUS BLD VENIPUNCTURE: CPT

## 2021-02-09 ENCOUNTER — OFFICE VISIT (OUTPATIENT)
Dept: ORTHOPEDICS | Facility: CLINIC | Age: 56
End: 2021-02-09
Payer: COMMERCIAL

## 2021-02-09 VITALS — BODY MASS INDEX: 47.09 KG/M2 | HEIGHT: 66 IN | WEIGHT: 293 LBS

## 2021-02-09 DIAGNOSIS — Z01.818 PRE-OP TESTING: Primary | ICD-10-CM

## 2021-02-09 DIAGNOSIS — G56.22 CUBITAL TUNNEL SYNDROME ON LEFT: Primary | ICD-10-CM

## 2021-02-09 DIAGNOSIS — M92.212 KIENBOCK'S DISEASE, LEFT: ICD-10-CM

## 2021-02-09 DIAGNOSIS — M19.032 ARTHRITIS OF LEFT WRIST: ICD-10-CM

## 2021-02-09 DIAGNOSIS — G56.02 LEFT CARPAL TUNNEL SYNDROME: ICD-10-CM

## 2021-02-09 PROCEDURE — 99214 OFFICE O/P EST MOD 30 MIN: CPT | Mod: S$GLB,,, | Performed by: ORTHOPAEDIC SURGERY

## 2021-02-09 PROCEDURE — 99999 PR PBB SHADOW E&M-EST. PATIENT-LVL III: ICD-10-PCS | Mod: PBBFAC,,, | Performed by: ORTHOPAEDIC SURGERY

## 2021-02-09 PROCEDURE — 3008F PR BODY MASS INDEX (BMI) DOCUMENTED: ICD-10-PCS | Mod: CPTII,S$GLB,, | Performed by: ORTHOPAEDIC SURGERY

## 2021-02-09 PROCEDURE — 99999 PR PBB SHADOW E&M-EST. PATIENT-LVL III: CPT | Mod: PBBFAC,,, | Performed by: ORTHOPAEDIC SURGERY

## 2021-02-09 PROCEDURE — 1125F AMNT PAIN NOTED PAIN PRSNT: CPT | Mod: S$GLB,,, | Performed by: ORTHOPAEDIC SURGERY

## 2021-02-09 PROCEDURE — 99214 PR OFFICE/OUTPT VISIT, EST, LEVL IV, 30-39 MIN: ICD-10-PCS | Mod: S$GLB,,, | Performed by: ORTHOPAEDIC SURGERY

## 2021-02-09 PROCEDURE — 3008F BODY MASS INDEX DOCD: CPT | Mod: CPTII,S$GLB,, | Performed by: ORTHOPAEDIC SURGERY

## 2021-02-09 PROCEDURE — 1125F PR PAIN SEVERITY QUANTIFIED, PAIN PRESENT: ICD-10-PCS | Mod: S$GLB,,, | Performed by: ORTHOPAEDIC SURGERY

## 2021-02-12 ENCOUNTER — PATIENT MESSAGE (OUTPATIENT)
Dept: ELECTROPHYSIOLOGY | Facility: CLINIC | Age: 56
End: 2021-02-12

## 2021-02-12 DIAGNOSIS — G56.02 CARPAL TUNNEL SYNDROME ON LEFT: Primary | ICD-10-CM

## 2021-02-12 DIAGNOSIS — G56.22 ULNAR NEUROPATHY AT ELBOW OF LEFT UPPER EXTREMITY: ICD-10-CM

## 2021-02-13 ENCOUNTER — PATIENT MESSAGE (OUTPATIENT)
Dept: ELECTROPHYSIOLOGY | Facility: CLINIC | Age: 56
End: 2021-02-13

## 2021-02-17 ENCOUNTER — TELEPHONE (OUTPATIENT)
Dept: ELECTROPHYSIOLOGY | Facility: CLINIC | Age: 56
End: 2021-02-17

## 2021-02-17 DIAGNOSIS — I48.91 ATRIAL FIBRILLATION, UNSPECIFIED TYPE: Primary | ICD-10-CM

## 2021-03-01 ENCOUNTER — PATIENT MESSAGE (OUTPATIENT)
Dept: ELECTROPHYSIOLOGY | Facility: CLINIC | Age: 56
End: 2021-03-01

## 2021-03-01 DIAGNOSIS — I48.0 PAROXYSMAL ATRIAL FIBRILLATION: Primary | ICD-10-CM

## 2021-03-01 RX ORDER — FLECAINIDE ACETATE 100 MG/1
100 TABLET ORAL EVERY 12 HOURS
Qty: 60 TABLET | Refills: 11 | Status: SHIPPED | OUTPATIENT
Start: 2021-03-01 | End: 2021-07-19

## 2021-03-10 ENCOUNTER — OFFICE VISIT (OUTPATIENT)
Dept: WOUND CARE | Facility: CLINIC | Age: 56
End: 2021-03-10
Payer: COMMERCIAL

## 2021-03-10 VITALS
HEIGHT: 66 IN | BODY MASS INDEX: 46.16 KG/M2 | SYSTOLIC BLOOD PRESSURE: 119 MMHG | HEART RATE: 66 BPM | DIASTOLIC BLOOD PRESSURE: 61 MMHG | WEIGHT: 287.25 LBS | TEMPERATURE: 97 F

## 2021-03-10 DIAGNOSIS — T81.30XA WOUND DEHISCENCE: Primary | ICD-10-CM

## 2021-03-10 PROCEDURE — 3008F PR BODY MASS INDEX (BMI) DOCUMENTED: ICD-10-PCS | Mod: CPTII,S$GLB,, | Performed by: NURSE PRACTITIONER

## 2021-03-10 PROCEDURE — 3078F PR MOST RECENT DIASTOLIC BLOOD PRESSURE < 80 MM HG: ICD-10-PCS | Mod: CPTII,S$GLB,, | Performed by: NURSE PRACTITIONER

## 2021-03-10 PROCEDURE — 1125F AMNT PAIN NOTED PAIN PRSNT: CPT | Mod: S$GLB,,, | Performed by: NURSE PRACTITIONER

## 2021-03-10 PROCEDURE — 3008F BODY MASS INDEX DOCD: CPT | Mod: CPTII,S$GLB,, | Performed by: NURSE PRACTITIONER

## 2021-03-10 PROCEDURE — 99999 PR PBB SHADOW E&M-EST. PATIENT-LVL V: CPT | Mod: PBBFAC,,, | Performed by: NURSE PRACTITIONER

## 2021-03-10 PROCEDURE — 99213 OFFICE O/P EST LOW 20 MIN: CPT | Mod: S$GLB,,, | Performed by: NURSE PRACTITIONER

## 2021-03-10 PROCEDURE — 3074F SYST BP LT 130 MM HG: CPT | Mod: CPTII,S$GLB,, | Performed by: NURSE PRACTITIONER

## 2021-03-10 PROCEDURE — 3078F DIAST BP <80 MM HG: CPT | Mod: CPTII,S$GLB,, | Performed by: NURSE PRACTITIONER

## 2021-03-10 PROCEDURE — 1125F PR PAIN SEVERITY QUANTIFIED, PAIN PRESENT: ICD-10-PCS | Mod: S$GLB,,, | Performed by: NURSE PRACTITIONER

## 2021-03-10 PROCEDURE — 99999 PR PBB SHADOW E&M-EST. PATIENT-LVL V: ICD-10-PCS | Mod: PBBFAC,,, | Performed by: NURSE PRACTITIONER

## 2021-03-10 PROCEDURE — 3074F PR MOST RECENT SYSTOLIC BLOOD PRESSURE < 130 MM HG: ICD-10-PCS | Mod: CPTII,S$GLB,, | Performed by: NURSE PRACTITIONER

## 2021-03-10 PROCEDURE — 99213 PR OFFICE/OUTPT VISIT, EST, LEVL III, 20-29 MIN: ICD-10-PCS | Mod: S$GLB,,, | Performed by: NURSE PRACTITIONER

## 2021-03-23 ENCOUNTER — PATIENT MESSAGE (OUTPATIENT)
Dept: ELECTROPHYSIOLOGY | Facility: CLINIC | Age: 56
End: 2021-03-23

## 2021-03-25 ENCOUNTER — RESEARCH ENCOUNTER (OUTPATIENT)
Dept: RESEARCH | Facility: HOSPITAL | Age: 56
End: 2021-03-25

## 2021-03-30 ENCOUNTER — RESEARCH ENCOUNTER (OUTPATIENT)
Dept: RESEARCH | Facility: HOSPITAL | Age: 56
End: 2021-03-30

## 2021-03-30 ENCOUNTER — OFFICE VISIT (OUTPATIENT)
Dept: ELECTROPHYSIOLOGY | Facility: CLINIC | Age: 56
End: 2021-03-30
Payer: COMMERCIAL

## 2021-03-30 ENCOUNTER — HOSPITAL ENCOUNTER (OUTPATIENT)
Dept: CARDIOLOGY | Facility: CLINIC | Age: 56
Discharge: HOME | End: 2021-03-30
Payer: COMMERCIAL

## 2021-03-30 VITALS
BODY MASS INDEX: 47.09 KG/M2 | WEIGHT: 293 LBS | DIASTOLIC BLOOD PRESSURE: 72 MMHG | HEIGHT: 66 IN | HEART RATE: 78 BPM | SYSTOLIC BLOOD PRESSURE: 120 MMHG

## 2021-03-30 DIAGNOSIS — I48.0 PAF (PAROXYSMAL ATRIAL FIBRILLATION): Primary | ICD-10-CM

## 2021-03-30 DIAGNOSIS — I48.91 ATRIAL FIBRILLATION, UNSPECIFIED TYPE: ICD-10-CM

## 2021-03-30 PROCEDURE — 1126F AMNT PAIN NOTED NONE PRSNT: CPT | Mod: S$GLB,,, | Performed by: INTERNAL MEDICINE

## 2021-03-30 PROCEDURE — 3008F PR BODY MASS INDEX (BMI) DOCUMENTED: ICD-10-PCS | Mod: CPTII,S$GLB,, | Performed by: INTERNAL MEDICINE

## 2021-03-30 PROCEDURE — 93005 RHYTHM STRIP: ICD-10-PCS | Mod: S$GLB,,, | Performed by: INTERNAL MEDICINE

## 2021-03-30 PROCEDURE — 3008F BODY MASS INDEX DOCD: CPT | Mod: CPTII,S$GLB,, | Performed by: INTERNAL MEDICINE

## 2021-03-30 PROCEDURE — 3078F DIAST BP <80 MM HG: CPT | Mod: CPTII,S$GLB,, | Performed by: INTERNAL MEDICINE

## 2021-03-30 PROCEDURE — 3074F PR MOST RECENT SYSTOLIC BLOOD PRESSURE < 130 MM HG: ICD-10-PCS | Mod: CPTII,S$GLB,, | Performed by: INTERNAL MEDICINE

## 2021-03-30 PROCEDURE — 1126F PR PAIN SEVERITY QUANTIFIED, NO PAIN PRESENT: ICD-10-PCS | Mod: S$GLB,,, | Performed by: INTERNAL MEDICINE

## 2021-03-30 PROCEDURE — 93005 ELECTROCARDIOGRAM TRACING: CPT | Mod: S$GLB,,, | Performed by: INTERNAL MEDICINE

## 2021-03-30 PROCEDURE — 93010 RHYTHM STRIP: ICD-10-PCS | Mod: S$GLB,,, | Performed by: INTERNAL MEDICINE

## 2021-03-30 PROCEDURE — 3078F PR MOST RECENT DIASTOLIC BLOOD PRESSURE < 80 MM HG: ICD-10-PCS | Mod: CPTII,S$GLB,, | Performed by: INTERNAL MEDICINE

## 2021-03-30 PROCEDURE — 3074F SYST BP LT 130 MM HG: CPT | Mod: CPTII,S$GLB,, | Performed by: INTERNAL MEDICINE

## 2021-03-30 PROCEDURE — 93010 ELECTROCARDIOGRAM REPORT: CPT | Mod: S$GLB,,, | Performed by: INTERNAL MEDICINE

## 2021-03-30 PROCEDURE — 99999 PR PBB SHADOW E&M-EST. PATIENT-LVL III: CPT | Mod: PBBFAC,,, | Performed by: INTERNAL MEDICINE

## 2021-03-30 PROCEDURE — 99214 PR OFFICE/OUTPT VISIT, EST, LEVL IV, 30-39 MIN: ICD-10-PCS | Mod: S$GLB,,, | Performed by: INTERNAL MEDICINE

## 2021-03-30 PROCEDURE — 99214 OFFICE O/P EST MOD 30 MIN: CPT | Mod: S$GLB,,, | Performed by: INTERNAL MEDICINE

## 2021-03-30 PROCEDURE — 99999 PR PBB SHADOW E&M-EST. PATIENT-LVL III: ICD-10-PCS | Mod: PBBFAC,,, | Performed by: INTERNAL MEDICINE

## 2021-04-21 ENCOUNTER — OFFICE VISIT (OUTPATIENT)
Dept: WOUND CARE | Facility: CLINIC | Age: 56
End: 2021-04-21
Payer: COMMERCIAL

## 2021-04-21 VITALS
DIASTOLIC BLOOD PRESSURE: 74 MMHG | BODY MASS INDEX: 47.09 KG/M2 | HEIGHT: 66 IN | SYSTOLIC BLOOD PRESSURE: 153 MMHG | TEMPERATURE: 98 F | WEIGHT: 293 LBS | HEART RATE: 80 BPM

## 2021-04-21 DIAGNOSIS — T81.30XA WOUND DEHISCENCE: Primary | ICD-10-CM

## 2021-04-21 PROCEDURE — 3008F PR BODY MASS INDEX (BMI) DOCUMENTED: ICD-10-PCS | Mod: CPTII,S$GLB,, | Performed by: NURSE PRACTITIONER

## 2021-04-21 PROCEDURE — 99212 PR OFFICE/OUTPT VISIT, EST, LEVL II, 10-19 MIN: ICD-10-PCS | Mod: S$GLB,,, | Performed by: NURSE PRACTITIONER

## 2021-04-21 PROCEDURE — 1126F AMNT PAIN NOTED NONE PRSNT: CPT | Mod: S$GLB,,, | Performed by: NURSE PRACTITIONER

## 2021-04-21 PROCEDURE — 1126F PR PAIN SEVERITY QUANTIFIED, NO PAIN PRESENT: ICD-10-PCS | Mod: S$GLB,,, | Performed by: NURSE PRACTITIONER

## 2021-04-21 PROCEDURE — 3008F BODY MASS INDEX DOCD: CPT | Mod: CPTII,S$GLB,, | Performed by: NURSE PRACTITIONER

## 2021-04-21 PROCEDURE — 99999 PR PBB SHADOW E&M-EST. PATIENT-LVL V: CPT | Mod: PBBFAC,,, | Performed by: NURSE PRACTITIONER

## 2021-04-21 PROCEDURE — 99999 PR PBB SHADOW E&M-EST. PATIENT-LVL V: ICD-10-PCS | Mod: PBBFAC,,, | Performed by: NURSE PRACTITIONER

## 2021-04-21 PROCEDURE — 99212 OFFICE O/P EST SF 10 MIN: CPT | Mod: S$GLB,,, | Performed by: NURSE PRACTITIONER

## 2021-04-23 ENCOUNTER — PATIENT MESSAGE (OUTPATIENT)
Dept: ELECTROPHYSIOLOGY | Facility: CLINIC | Age: 56
End: 2021-04-23

## 2021-04-23 ENCOUNTER — TELEPHONE (OUTPATIENT)
Dept: RESEARCH | Facility: HOSPITAL | Age: 56
End: 2021-04-23

## 2021-04-26 ENCOUNTER — RESEARCH ENCOUNTER (OUTPATIENT)
Dept: RESEARCH | Facility: HOSPITAL | Age: 56
End: 2021-04-26

## 2021-04-26 NOTE — SUBJECTIVE & OBJECTIVE
Past Medical History:   Diagnosis Date    Depression     Hx of psychiatric care     Psychiatric problem        History reviewed. No pertinent surgical history.    Review of patient's allergies indicates:  No Known Allergies    No current facility-administered medications on file prior to encounter.      Current Outpatient Medications on File Prior to Encounter   Medication Sig    amoxicillin (AMOXIL) 500 MG Tab Take 1 tablet (500 mg total) by mouth 3 (three) times daily.    atenolol (TENORMIN) 50 MG tablet Take 1 tablet by mouth Twice a day By Mouth 90 days    cyanocobalamin (VITAMIN B-12) 1000 MCG tablet Take 100 mcg by mouth once daily.     DULoxetine (CYMBALTA) 60 MG capsule Take 2 capsules (120 mg total) by mouth once daily.    gluc-shikha-[facility]#[id number] 750 mg-644 mg- 30 mg-1 mg Tab Take 1 tablet by mouth once daily.     hydroCHLOROthiazide (HYDRODIURIL) 50 MG tablet take 1 Tablet by  mouth  Once a day    ibuprofen (ADVIL,MOTRIN) 800 MG tablet take 1 Tablet by mouth  Twice a day    lisinopril (PRINIVIL,ZESTRIL) 40 MG tablet Take 1 tablet by mouth Once a day Orally 90    MELATONIN ORAL Take 1-2 tablets by mouth nightly as needed (Sleep).    multivitamin (THERAGRAN) per tablet Take 1 tablet by mouth once daily.    omega-3 fatty acids-vitamin E 1,000 mg Cap     omeprazole (PRILOSEC) 20 MG capsule take 1 capsule Once a day By Mouth    traZODone (DESYREL) 100 MG tablet Take 1 tablet (100 mg total) by mouth nightly as needed for Insomnia.    pneumococcal 23-dong ps vaccine (PNEUMOVAX 23) 25 mcg/0.5 mL Inject into the muscle.    varicella-zoster gE-AS01B, PF, (SHINGRIX) 50 mcg/0.5 mL injection Inject into the muscle.    varicella-zoster gE-AS01B, PF, (SHINGRIX, PF,) 50 mcg/0.5 mL injection Inject into the muscle.    [DISCONTINUED] acetaminophen-codeine 300-30mg (TYLENOL #3) 300-30 mg Tab Take 1 tablet by mouth every 4 (four) hours as needed for pain.    [DISCONTINUED] amoxicillin (AMOXIL) 500  MG Tab Take 4 tablets (2,000 mg total) by mouth one hour before appointment    [DISCONTINUED] amoxicillin-clavulanate 875-125mg (AUGMENTIN) 875-125 mg per tablet take 1 tablet by mouth twice daily for 10 days    [DISCONTINUED] atenolol (TENORMIN) 50 MG tablet Take 50 mg by mouth 2 (two) times daily.    [DISCONTINUED] atenolol (TENORMIN) 50 MG tablet Take 1 tablet by mouth twice daily    [DISCONTINUED] atenolol (TENORMIN) 50 MG tablet take 1 tablet Twice a day By Mouth    [DISCONTINUED] hydrochlorothiazide (HYDRODIURIL) 50 MG tablet Take 50 mg by mouth once daily.    [DISCONTINUED] hydroCHLOROthiazide (HYDRODIURIL) 50 MG tablet Take 1 tablet by mouth once daily in the morning    [DISCONTINUED] hydroCHLOROthiazide (HYDRODIURIL) 50 MG tablet Take 1 tablet (50 mg total) by mouth once daily.    [DISCONTINUED] ibuprofen (ADVIL,MOTRIN) 800 MG tablet     [DISCONTINUED] ibuprofen (ADVIL,MOTRIN) 800 MG tablet Take 1 tablet by mouth twice daily    [DISCONTINUED] ibuprofen (ADVIL,MOTRIN) 800 MG tablet Take 1 tablet (800 mg total) by mouth 2 (two) times daily.    [DISCONTINUED] lisinopril (PRINIVIL,ZESTRIL) 40 MG tablet Take 40 mg by mouth once daily.    [DISCONTINUED] lisinopril (PRINIVIL,ZESTRIL) 40 MG tablet take 1 tablet by mouth  Once a day    [DISCONTINUED] omeprazole (PRILOSEC) 20 MG capsule Take 20 mg by mouth once daily.     Family History     Problem Relation (Age of Onset)    Cataracts Mother    Diabetes Father    Hypertension Mother, Father, Sister, Brother    Thyroid disease Mother        Tobacco Use    Smoking status: Current Every Day Smoker     Types: Cigarettes    Smokeless tobacco: Never Used   Substance and Sexual Activity    Alcohol use: No    Drug use: No    Sexual activity: Not on file     Review of Systems   Constitution: Negative for chills, decreased appetite, diaphoresis, fever, malaise/fatigue, night sweats, weight gain and weight loss.   Eyes: Negative.    Cardiovascular: Negative  for chest pain, irregular heartbeat, leg swelling, near-syncope, orthopnea, palpitations, paroxysmal nocturnal dyspnea and syncope.   Respiratory: Negative for cough, shortness of breath, sputum production and wheezing.    Endocrine: Negative.    Hematologic/Lymphatic: Negative for bleeding problem.   Skin: Negative for color change, flushing, rash and suspicious lesions.   Musculoskeletal: Negative.    Gastrointestinal: Negative for bloating, abdominal pain, change in bowel habit, constipation, diarrhea, heartburn, melena, nausea and vomiting.   Genitourinary: Negative for flank pain, frequency, hematuria, incomplete emptying and urgency.   Neurological: Negative for dizziness, focal weakness, headaches, light-headedness, numbness, paresthesias, seizures, sensory change and weakness.   Psychiatric/Behavioral: Negative for altered mental status. The patient is not nervous/anxious.      Objective:     Vital Signs (Most Recent):  Temp: 96.7 °F (35.9 °C) (01/29/20 0850)  Pulse: 83 (01/29/20 1108)  Resp: 16 (01/29/20 0850)  BP: 136/70 (01/29/20 0850)  SpO2: 96 % (01/29/20 0850) Vital Signs (24h Range):  Temp:  [96.7 °F (35.9 °C)-98.6 °F (37 °C)] 96.7 °F (35.9 °C)  Pulse:  [] 83  Resp:  [16-24] 16  SpO2:  [91 %-99 %] 96 %  BP: (114-149)/(65-99) 136/70     Weight: 133.6 kg (294 lb 8.6 oz)  Body mass index is 49.01 kg/m².    SpO2: 96 %  O2 Device (Oxygen Therapy): room air      Intake/Output Summary (Last 24 hours) at 1/29/2020 1129  Last data filed at 1/29/2020 0900  Gross per 24 hour   Intake 1260 ml   Output 2800 ml   Net -1540 ml       Lines/Drains/Airways     Peripheral Intravenous Line                 Peripheral IV - Single Lumen 01/28/20 0519 20 G Right Antecubital 1 day                Physical Exam   Constitutional: She is oriented to person, place, and time. She appears well-developed and well-nourished. No distress.   HENT:   Head: Normocephalic and atraumatic.   Mouth/Throat: Oropharynx is clear and moist.    Eyes: Pupils are equal, round, and reactive to light. EOM are normal.   Neck: Normal range of motion. Neck supple. No JVD present.   Cardiovascular: Normal rate. An irregularly irregular rhythm present. Exam reveals no gallop and no friction rub.   No murmur heard.  Pulmonary/Chest: Effort normal and breath sounds normal. No respiratory distress. She has no wheezes. She has no rales. She exhibits no tenderness.   Abdominal: Soft. Bowel sounds are normal. She exhibits no distension. There is no tenderness.   Musculoskeletal: Normal range of motion. She exhibits no edema.   Lymphadenopathy:     She has no cervical adenopathy.   Neurological: She is alert and oriented to person, place, and time.   Skin: Skin is warm and dry. No erythema.   Psychiatric: She has a normal mood and affect. Her behavior is normal. Judgment and thought content normal.       Significant Labs: Reviewed.    Significant Imaging: Reviewed.   no

## 2021-04-29 ENCOUNTER — PATIENT MESSAGE (OUTPATIENT)
Dept: ELECTROPHYSIOLOGY | Facility: CLINIC | Age: 56
End: 2021-04-29

## 2021-04-30 ENCOUNTER — ANESTHESIA EVENT (OUTPATIENT)
Dept: SURGERY | Facility: OTHER | Age: 56
End: 2021-04-30
Payer: COMMERCIAL

## 2021-04-30 ENCOUNTER — HOSPITAL ENCOUNTER (OUTPATIENT)
Dept: PREADMISSION TESTING | Facility: OTHER | Age: 56
Discharge: HOME | End: 2021-04-30
Attending: ORTHOPAEDIC SURGERY
Payer: COMMERCIAL

## 2021-04-30 VITALS
RESPIRATION RATE: 16 BRPM | TEMPERATURE: 98 F | BODY MASS INDEX: 47.09 KG/M2 | OXYGEN SATURATION: 98 % | WEIGHT: 293 LBS | SYSTOLIC BLOOD PRESSURE: 156 MMHG | HEIGHT: 66 IN | DIASTOLIC BLOOD PRESSURE: 70 MMHG | HEART RATE: 74 BPM

## 2021-04-30 DIAGNOSIS — Z01.818 PREOP TESTING: Primary | ICD-10-CM

## 2021-04-30 LAB
ANION GAP SERPL CALC-SCNC: 12 MMOL/L (ref 8–16)
BASOPHILS # BLD AUTO: 0.06 K/UL (ref 0–0.2)
BASOPHILS NFR BLD: 0.8 % (ref 0–1.9)
BUN SERPL-MCNC: 15 MG/DL (ref 6–20)
CALCIUM SERPL-MCNC: 9.6 MG/DL (ref 8.7–10.5)
CHLORIDE SERPL-SCNC: 92 MMOL/L (ref 95–110)
CO2 SERPL-SCNC: 29 MMOL/L (ref 23–29)
CREAT SERPL-MCNC: 0.8 MG/DL (ref 0.5–1.4)
DIFFERENTIAL METHOD: ABNORMAL
EOSINOPHIL # BLD AUTO: 0.1 K/UL (ref 0–0.5)
EOSINOPHIL NFR BLD: 1.8 % (ref 0–8)
ERYTHROCYTE [DISTWIDTH] IN BLOOD BY AUTOMATED COUNT: 19.9 % (ref 11.5–14.5)
EST. GFR  (AFRICAN AMERICAN): >60 ML/MIN/1.73 M^2
EST. GFR  (NON AFRICAN AMERICAN): >60 ML/MIN/1.73 M^2
GLUCOSE SERPL-MCNC: 96 MG/DL (ref 70–110)
HCT VFR BLD AUTO: 35.3 % (ref 37–48.5)
HGB BLD-MCNC: 10.8 G/DL (ref 12–16)
IMM GRANULOCYTES # BLD AUTO: 0.04 K/UL (ref 0–0.04)
IMM GRANULOCYTES NFR BLD AUTO: 0.5 % (ref 0–0.5)
LYMPHOCYTES # BLD AUTO: 1.2 K/UL (ref 1–4.8)
LYMPHOCYTES NFR BLD: 15.6 % (ref 18–48)
MCH RBC QN AUTO: 24.3 PG (ref 27–31)
MCHC RBC AUTO-ENTMCNC: 30.6 G/DL (ref 32–36)
MCV RBC AUTO: 80 FL (ref 82–98)
MONOCYTES # BLD AUTO: 0.7 K/UL (ref 0.3–1)
MONOCYTES NFR BLD: 9.4 % (ref 4–15)
NEUTROPHILS # BLD AUTO: 5.6 K/UL (ref 1.8–7.7)
NEUTROPHILS NFR BLD: 71.9 % (ref 38–73)
NRBC BLD-RTO: 0 /100 WBC
PLATELET # BLD AUTO: 175 K/UL (ref 150–450)
PMV BLD AUTO: 10.1 FL (ref 9.2–12.9)
POTASSIUM SERPL-SCNC: 3.6 MMOL/L (ref 3.5–5.1)
RBC # BLD AUTO: 4.44 M/UL (ref 4–5.4)
SODIUM SERPL-SCNC: 133 MMOL/L (ref 136–145)
WBC # BLD AUTO: 7.76 K/UL (ref 3.9–12.7)

## 2021-04-30 PROCEDURE — 36415 COLL VENOUS BLD VENIPUNCTURE: CPT | Performed by: SPECIALIST

## 2021-04-30 PROCEDURE — 85025 COMPLETE CBC W/AUTO DIFF WBC: CPT | Performed by: SPECIALIST

## 2021-04-30 PROCEDURE — 80048 BASIC METABOLIC PNL TOTAL CA: CPT | Performed by: SPECIALIST

## 2021-04-30 RX ORDER — FAMOTIDINE 20 MG/1
20 TABLET, FILM COATED ORAL
Status: CANCELLED | OUTPATIENT
Start: 2021-04-30 | End: 2021-04-30

## 2021-04-30 RX ORDER — ACETAMINOPHEN 500 MG
1000 TABLET ORAL
Status: CANCELLED | OUTPATIENT
Start: 2021-04-30 | End: 2021-04-30

## 2021-04-30 RX ORDER — SODIUM CHLORIDE, SODIUM LACTATE, POTASSIUM CHLORIDE, CALCIUM CHLORIDE 600; 310; 30; 20 MG/100ML; MG/100ML; MG/100ML; MG/100ML
INJECTION, SOLUTION INTRAVENOUS CONTINUOUS
Status: CANCELLED | OUTPATIENT
Start: 2021-04-30

## 2021-04-30 RX ORDER — LIDOCAINE HYDROCHLORIDE 10 MG/ML
0.5 INJECTION, SOLUTION EPIDURAL; INFILTRATION; INTRACAUDAL; PERINEURAL ONCE
Status: CANCELLED | OUTPATIENT
Start: 2021-04-30 | End: 2021-04-30

## 2021-05-02 ENCOUNTER — LAB VISIT (OUTPATIENT)
Dept: SPORTS MEDICINE | Facility: CLINIC | Age: 56
End: 2021-05-02
Payer: COMMERCIAL

## 2021-05-02 DIAGNOSIS — Z01.818 PRE-OP TESTING: ICD-10-CM

## 2021-05-02 PROCEDURE — U0003 INFECTIOUS AGENT DETECTION BY NUCLEIC ACID (DNA OR RNA); SEVERE ACUTE RESPIRATORY SYNDROME CORONAVIRUS 2 (SARS-COV-2) (CORONAVIRUS DISEASE [COVID-19]), AMPLIFIED PROBE TECHNIQUE, MAKING USE OF HIGH THROUGHPUT TECHNOLOGIES AS DESCRIBED BY CMS-2020-01-R: HCPCS | Performed by: ORTHOPAEDIC SURGERY

## 2021-05-02 PROCEDURE — U0005 INFEC AGEN DETEC AMPLI PROBE: HCPCS | Performed by: ORTHOPAEDIC SURGERY

## 2021-05-03 LAB — SARS-COV-2 RNA RESP QL NAA+PROBE: NOT DETECTED

## 2021-05-04 ENCOUNTER — TELEPHONE (OUTPATIENT)
Dept: ORTHOPEDICS | Facility: CLINIC | Age: 56
End: 2021-05-04

## 2021-05-04 DIAGNOSIS — G56.00 CARPAL TUNNEL SYNDROME: ICD-10-CM

## 2021-05-04 DIAGNOSIS — G56.02 CARPAL TUNNEL SYNDROME ON LEFT: Primary | ICD-10-CM

## 2021-05-04 DIAGNOSIS — Z98.890 POST-OPERATIVE STATE: Primary | ICD-10-CM

## 2021-05-04 RX ORDER — MUPIROCIN 20 MG/G
OINTMENT TOPICAL
Status: CANCELLED | OUTPATIENT
Start: 2021-05-04

## 2021-05-04 RX ORDER — HYDROCODONE BITARTRATE AND ACETAMINOPHEN 5; 325 MG/1; MG/1
1 TABLET ORAL EVERY 6 HOURS PRN
Qty: 20 TABLET | Refills: 0 | Status: SHIPPED | OUTPATIENT
Start: 2021-05-04 | End: 2021-05-11 | Stop reason: SDUPTHER

## 2021-05-05 ENCOUNTER — ANESTHESIA (OUTPATIENT)
Dept: SURGERY | Facility: OTHER | Age: 56
End: 2021-05-05
Payer: COMMERCIAL

## 2021-05-05 ENCOUNTER — HOSPITAL ENCOUNTER (OUTPATIENT)
Facility: OTHER | Age: 56
Discharge: HOME | End: 2021-05-05
Attending: ORTHOPAEDIC SURGERY | Admitting: ORTHOPAEDIC SURGERY
Payer: COMMERCIAL

## 2021-05-05 VITALS
HEART RATE: 66 BPM | DIASTOLIC BLOOD PRESSURE: 73 MMHG | BODY MASS INDEX: 47.09 KG/M2 | SYSTOLIC BLOOD PRESSURE: 156 MMHG | RESPIRATION RATE: 18 BRPM | WEIGHT: 293 LBS | TEMPERATURE: 98 F | HEIGHT: 66 IN | OXYGEN SATURATION: 96 %

## 2021-05-05 DIAGNOSIS — G56.00 CARPAL TUNNEL SYNDROME: ICD-10-CM

## 2021-05-05 DIAGNOSIS — G56.02 CARPAL TUNNEL SYNDROME ON LEFT: ICD-10-CM

## 2021-05-05 PROBLEM — G56.22 ULNAR NEUROPATHY AT ELBOW OF LEFT UPPER EXTREMITY: Status: ACTIVE | Noted: 2021-05-05

## 2021-05-05 PROCEDURE — 63600175 PHARM REV CODE 636 W HCPCS: Performed by: NURSE ANESTHETIST, CERTIFIED REGISTERED

## 2021-05-05 PROCEDURE — 25000003 PHARM REV CODE 250: Performed by: SPECIALIST

## 2021-05-05 PROCEDURE — 64718 REVISE ULNAR NERVE AT ELBOW: CPT | Mod: LT,,, | Performed by: ORTHOPAEDIC SURGERY

## 2021-05-05 PROCEDURE — 36000707: Performed by: ORTHOPAEDIC SURGERY

## 2021-05-05 PROCEDURE — 63600175 PHARM REV CODE 636 W HCPCS: Performed by: STUDENT IN AN ORGANIZED HEALTH CARE EDUCATION/TRAINING PROGRAM

## 2021-05-05 PROCEDURE — 71000039 HC RECOVERY, EACH ADD'L HOUR: Performed by: ORTHOPAEDIC SURGERY

## 2021-05-05 PROCEDURE — 71000033 HC RECOVERY, INTIAL HOUR: Performed by: ORTHOPAEDIC SURGERY

## 2021-05-05 PROCEDURE — 64721 PR REVISE MEDIAN N/CARPAL TUNNEL SURG: ICD-10-PCS | Mod: 51,LT,, | Performed by: ORTHOPAEDIC SURGERY

## 2021-05-05 PROCEDURE — 64718 PR REVISE ULNAR NERVE AT ELBOW: ICD-10-PCS | Mod: LT,,, | Performed by: ORTHOPAEDIC SURGERY

## 2021-05-05 PROCEDURE — 25000003 PHARM REV CODE 250: Performed by: NURSE ANESTHETIST, CERTIFIED REGISTERED

## 2021-05-05 PROCEDURE — 37000008 HC ANESTHESIA 1ST 15 MINUTES: Performed by: ORTHOPAEDIC SURGERY

## 2021-05-05 PROCEDURE — 64721 CARPAL TUNNEL SURGERY: CPT | Mod: 51,LT,, | Performed by: ORTHOPAEDIC SURGERY

## 2021-05-05 PROCEDURE — 37000009 HC ANESTHESIA EA ADD 15 MINS: Performed by: ORTHOPAEDIC SURGERY

## 2021-05-05 PROCEDURE — 25000003 PHARM REV CODE 250: Performed by: ANESTHESIOLOGY

## 2021-05-05 PROCEDURE — 71000016 HC POSTOP RECOV ADDL HR: Performed by: ORTHOPAEDIC SURGERY

## 2021-05-05 PROCEDURE — 36000706: Performed by: ORTHOPAEDIC SURGERY

## 2021-05-05 PROCEDURE — 63600175 PHARM REV CODE 636 W HCPCS: Performed by: ANESTHESIOLOGY

## 2021-05-05 PROCEDURE — 71000015 HC POSTOP RECOV 1ST HR: Performed by: ORTHOPAEDIC SURGERY

## 2021-05-05 DEVICE — MEMBRANE CLARIX 1K 2.5CMX2.5CM: Type: IMPLANTABLE DEVICE | Site: ELBOW | Status: FUNCTIONAL

## 2021-05-05 RX ORDER — KETOROLAC TROMETHAMINE 30 MG/ML
30 INJECTION, SOLUTION INTRAMUSCULAR; INTRAVENOUS ONCE
Status: COMPLETED | OUTPATIENT
Start: 2021-05-05 | End: 2021-05-05

## 2021-05-05 RX ORDER — SODIUM CHLORIDE, SODIUM LACTATE, POTASSIUM CHLORIDE, CALCIUM CHLORIDE 600; 310; 30; 20 MG/100ML; MG/100ML; MG/100ML; MG/100ML
INJECTION, SOLUTION INTRAVENOUS CONTINUOUS
Status: DISCONTINUED | OUTPATIENT
Start: 2021-05-05 | End: 2021-05-05 | Stop reason: HOSPADM

## 2021-05-05 RX ORDER — ONDANSETRON 2 MG/ML
4 INJECTION INTRAMUSCULAR; INTRAVENOUS DAILY PRN
Status: DISCONTINUED | OUTPATIENT
Start: 2021-05-05 | End: 2021-05-05 | Stop reason: HOSPADM

## 2021-05-05 RX ORDER — SODIUM CHLORIDE 0.9 % (FLUSH) 0.9 %
5 SYRINGE (ML) INJECTION
Status: DISCONTINUED | OUTPATIENT
Start: 2021-05-05 | End: 2021-05-05 | Stop reason: HOSPADM

## 2021-05-05 RX ORDER — SODIUM CHLORIDE 0.9 % (FLUSH) 0.9 %
3 SYRINGE (ML) INJECTION
Status: DISCONTINUED | OUTPATIENT
Start: 2021-05-05 | End: 2021-05-05 | Stop reason: HOSPADM

## 2021-05-05 RX ORDER — ONDANSETRON HYDROCHLORIDE 2 MG/ML
INJECTION, SOLUTION INTRAMUSCULAR; INTRAVENOUS
Status: DISCONTINUED | OUTPATIENT
Start: 2021-05-05 | End: 2021-05-05

## 2021-05-05 RX ORDER — LIDOCAINE HCL/PF 100 MG/5ML
SYRINGE (ML) INTRAVENOUS
Status: DISCONTINUED | OUTPATIENT
Start: 2021-05-05 | End: 2021-05-05

## 2021-05-05 RX ORDER — OXYCODONE HYDROCHLORIDE 5 MG/1
5 TABLET ORAL
Status: DISCONTINUED | OUTPATIENT
Start: 2021-05-05 | End: 2021-05-05 | Stop reason: HOSPADM

## 2021-05-05 RX ORDER — LIDOCAINE HYDROCHLORIDE 10 MG/ML
0.5 INJECTION, SOLUTION EPIDURAL; INFILTRATION; INTRACAUDAL; PERINEURAL ONCE
Status: DISCONTINUED | OUTPATIENT
Start: 2021-05-05 | End: 2021-05-05 | Stop reason: HOSPADM

## 2021-05-05 RX ORDER — HYDROMORPHONE HYDROCHLORIDE 2 MG/ML
0.4 INJECTION, SOLUTION INTRAMUSCULAR; INTRAVENOUS; SUBCUTANEOUS EVERY 5 MIN PRN
Status: DISCONTINUED | OUTPATIENT
Start: 2021-05-05 | End: 2021-05-05 | Stop reason: HOSPADM

## 2021-05-05 RX ORDER — HYDROCODONE BITARTRATE AND ACETAMINOPHEN 5; 325 MG/1; MG/1
1 TABLET ORAL EVERY 4 HOURS PRN
Status: DISCONTINUED | OUTPATIENT
Start: 2021-05-05 | End: 2021-05-05 | Stop reason: HOSPADM

## 2021-05-05 RX ORDER — NEOSTIGMINE METHYLSULFATE 1 MG/ML
INJECTION, SOLUTION INTRAVENOUS
Status: DISCONTINUED | OUTPATIENT
Start: 2021-05-05 | End: 2021-05-05

## 2021-05-05 RX ORDER — MEPERIDINE HYDROCHLORIDE 25 MG/ML
12.5 INJECTION INTRAMUSCULAR; INTRAVENOUS; SUBCUTANEOUS ONCE AS NEEDED
Status: DISCONTINUED | OUTPATIENT
Start: 2021-05-05 | End: 2021-05-05 | Stop reason: HOSPADM

## 2021-05-05 RX ORDER — ROCURONIUM BROMIDE 10 MG/ML
INJECTION, SOLUTION INTRAVENOUS
Status: DISCONTINUED | OUTPATIENT
Start: 2021-05-05 | End: 2021-05-05

## 2021-05-05 RX ORDER — HYDROCODONE BITARTRATE AND ACETAMINOPHEN 10; 325 MG/1; MG/1
1 TABLET ORAL EVERY 4 HOURS PRN
Status: DISCONTINUED | OUTPATIENT
Start: 2021-05-05 | End: 2021-05-05 | Stop reason: HOSPADM

## 2021-05-05 RX ORDER — CEFAZOLIN SODIUM 1 G/3ML
2 INJECTION, POWDER, FOR SOLUTION INTRAMUSCULAR; INTRAVENOUS
Status: COMPLETED | OUTPATIENT
Start: 2021-05-05 | End: 2021-05-05

## 2021-05-05 RX ORDER — FAMOTIDINE 20 MG/1
20 TABLET, FILM COATED ORAL
Status: COMPLETED | OUTPATIENT
Start: 2021-05-05 | End: 2021-05-05

## 2021-05-05 RX ORDER — ONDANSETRON 2 MG/ML
4 INJECTION INTRAMUSCULAR; INTRAVENOUS EVERY 12 HOURS PRN
Status: DISCONTINUED | OUTPATIENT
Start: 2021-05-05 | End: 2021-05-05 | Stop reason: HOSPADM

## 2021-05-05 RX ORDER — CYCLOBENZAPRINE HCL 5 MG
10 TABLET ORAL 3 TIMES DAILY PRN
Status: DISCONTINUED | OUTPATIENT
Start: 2021-05-05 | End: 2021-05-05 | Stop reason: HOSPADM

## 2021-05-05 RX ORDER — ACETAMINOPHEN 500 MG
1000 TABLET ORAL
Status: COMPLETED | OUTPATIENT
Start: 2021-05-05 | End: 2021-05-05

## 2021-05-05 RX ORDER — PROPOFOL 10 MG/ML
VIAL (ML) INTRAVENOUS
Status: DISCONTINUED | OUTPATIENT
Start: 2021-05-05 | End: 2021-05-05

## 2021-05-05 RX ORDER — PHENYLEPHRINE HYDROCHLORIDE 10 MG/ML
INJECTION INTRAVENOUS
Status: DISCONTINUED | OUTPATIENT
Start: 2021-05-05 | End: 2021-05-05

## 2021-05-05 RX ORDER — DEXAMETHASONE SODIUM PHOSPHATE 4 MG/ML
INJECTION, SOLUTION INTRA-ARTICULAR; INTRALESIONAL; INTRAMUSCULAR; INTRAVENOUS; SOFT TISSUE
Status: DISCONTINUED | OUTPATIENT
Start: 2021-05-05 | End: 2021-05-05

## 2021-05-05 RX ORDER — FENTANYL CITRATE 50 UG/ML
INJECTION, SOLUTION INTRAMUSCULAR; INTRAVENOUS
Status: DISCONTINUED | OUTPATIENT
Start: 2021-05-05 | End: 2021-05-05

## 2021-05-05 RX ADMIN — PHENYLEPHRINE HYDROCHLORIDE 200 MCG: 10 INJECTION INTRAVENOUS at 09:05

## 2021-05-05 RX ADMIN — CEFAZOLIN 2 G: 330 INJECTION, POWDER, FOR SOLUTION INTRAMUSCULAR; INTRAVENOUS at 09:05

## 2021-05-05 RX ADMIN — LIDOCAINE HYDROCHLORIDE 80 MG: 20 INJECTION, SOLUTION INTRAVENOUS at 08:05

## 2021-05-05 RX ADMIN — PROPOFOL 280 MG: 10 INJECTION, EMULSION INTRAVENOUS at 08:05

## 2021-05-05 RX ADMIN — OXYCODONE HYDROCHLORIDE 5 MG: 5 TABLET ORAL at 10:05

## 2021-05-05 RX ADMIN — DEXAMETHASONE SODIUM PHOSPHATE 8 MG: 4 INJECTION, SOLUTION INTRAMUSCULAR; INTRAVENOUS at 09:05

## 2021-05-05 RX ADMIN — KETOROLAC TROMETHAMINE 30 MG: 30 INJECTION, SOLUTION INTRAMUSCULAR; INTRAVENOUS at 10:05

## 2021-05-05 RX ADMIN — GLYCOPYRROLATE 0.6 MG: 0.2 INJECTION, SOLUTION INTRAMUSCULAR; INTRAVITREAL at 10:05

## 2021-05-05 RX ADMIN — FAMOTIDINE 20 MG: 20 TABLET, FILM COATED ORAL at 08:05

## 2021-05-05 RX ADMIN — NEOSTIGMINE METHYLSULFATE 5 MG: 1 INJECTION INTRAVENOUS at 10:05

## 2021-05-05 RX ADMIN — ROCURONIUM BROMIDE 20 MG: 10 SOLUTION INTRAVENOUS at 09:05

## 2021-05-05 RX ADMIN — CEFAZOLIN 1 G: 330 INJECTION, POWDER, FOR SOLUTION INTRAMUSCULAR; INTRAVENOUS at 09:05

## 2021-05-05 RX ADMIN — ONDANSETRON 4 MG: 2 INJECTION, SOLUTION INTRAMUSCULAR; INTRAVENOUS at 09:05

## 2021-05-05 RX ADMIN — ACETAMINOPHEN 1000 MG: 500 TABLET, FILM COATED ORAL at 08:05

## 2021-05-05 RX ADMIN — ROCURONIUM BROMIDE 40 MG: 10 SOLUTION INTRAVENOUS at 08:05

## 2021-05-05 RX ADMIN — FENTANYL CITRATE 50 MCG: 50 INJECTION, SOLUTION INTRAMUSCULAR; INTRAVENOUS at 10:05

## 2021-05-05 RX ADMIN — CYCLOBENZAPRINE HYDROCHLORIDE 10 MG: 5 TABLET, FILM COATED ORAL at 10:05

## 2021-05-05 RX ADMIN — FENTANYL CITRATE 100 MCG: 50 INJECTION, SOLUTION INTRAMUSCULAR; INTRAVENOUS at 08:05

## 2021-05-05 RX ADMIN — GLYCOPYRROLATE 0.2 MG: 0.2 INJECTION, SOLUTION INTRAMUSCULAR; INTRAVITREAL at 09:05

## 2021-05-11 DIAGNOSIS — Z98.890 POST-OPERATIVE STATE: ICD-10-CM

## 2021-05-11 RX ORDER — HYDROCODONE BITARTRATE AND ACETAMINOPHEN 5; 325 MG/1; MG/1
1 TABLET ORAL EVERY 6 HOURS PRN
Qty: 20 TABLET | Refills: 0 | Status: SHIPPED | OUTPATIENT
Start: 2021-05-11 | End: 2021-06-16

## 2021-05-20 ENCOUNTER — OFFICE VISIT (OUTPATIENT)
Dept: ORTHOPEDICS | Facility: CLINIC | Age: 56
End: 2021-05-20
Payer: COMMERCIAL

## 2021-05-20 VITALS
HEIGHT: 66 IN | WEIGHT: 293 LBS | DIASTOLIC BLOOD PRESSURE: 84 MMHG | BODY MASS INDEX: 47.09 KG/M2 | SYSTOLIC BLOOD PRESSURE: 142 MMHG | HEART RATE: 85 BPM

## 2021-05-20 DIAGNOSIS — Z47.89 ORTHOPEDIC AFTERCARE: ICD-10-CM

## 2021-05-20 DIAGNOSIS — G56.22 ULNAR NEUROPATHY AT ELBOW OF LEFT UPPER EXTREMITY: ICD-10-CM

## 2021-05-20 DIAGNOSIS — G56.02 CARPAL TUNNEL SYNDROME ON LEFT: Primary | ICD-10-CM

## 2021-05-20 PROCEDURE — 99999 PR PBB SHADOW E&M-EST. PATIENT-LVL IV: CPT | Mod: PBBFAC,,, | Performed by: PHYSICIAN ASSISTANT

## 2021-05-20 PROCEDURE — 1125F PR PAIN SEVERITY QUANTIFIED, PAIN PRESENT: ICD-10-PCS | Mod: S$GLB,,, | Performed by: PHYSICIAN ASSISTANT

## 2021-05-20 PROCEDURE — 99024 PR POST-OP FOLLOW-UP VISIT: ICD-10-PCS | Mod: S$GLB,,, | Performed by: PHYSICIAN ASSISTANT

## 2021-05-20 PROCEDURE — 1125F AMNT PAIN NOTED PAIN PRSNT: CPT | Mod: S$GLB,,, | Performed by: PHYSICIAN ASSISTANT

## 2021-05-20 PROCEDURE — 99024 POSTOP FOLLOW-UP VISIT: CPT | Mod: S$GLB,,, | Performed by: PHYSICIAN ASSISTANT

## 2021-05-20 PROCEDURE — 99999 PR PBB SHADOW E&M-EST. PATIENT-LVL IV: ICD-10-PCS | Mod: PBBFAC,,, | Performed by: PHYSICIAN ASSISTANT

## 2021-05-20 PROCEDURE — 3008F BODY MASS INDEX DOCD: CPT | Mod: CPTII,S$GLB,, | Performed by: PHYSICIAN ASSISTANT

## 2021-05-20 PROCEDURE — 3008F PR BODY MASS INDEX (BMI) DOCUMENTED: ICD-10-PCS | Mod: CPTII,S$GLB,, | Performed by: PHYSICIAN ASSISTANT

## 2021-05-27 ENCOUNTER — CLINICAL SUPPORT (OUTPATIENT)
Dept: REHABILITATION | Facility: HOSPITAL | Age: 56
End: 2021-05-27
Payer: COMMERCIAL

## 2021-05-27 DIAGNOSIS — M25.60 RANGE OF MOTION DEFICIT: ICD-10-CM

## 2021-05-27 DIAGNOSIS — R60.0 EDEMA OF HAND: ICD-10-CM

## 2021-05-27 PROCEDURE — 97165 OT EVAL LOW COMPLEX 30 MIN: CPT | Mod: PO

## 2021-05-27 PROCEDURE — 97110 THERAPEUTIC EXERCISES: CPT | Mod: PO

## 2021-06-01 ENCOUNTER — CLINICAL SUPPORT (OUTPATIENT)
Dept: REHABILITATION | Facility: HOSPITAL | Age: 56
End: 2021-06-01
Payer: COMMERCIAL

## 2021-06-01 DIAGNOSIS — M25.60 RANGE OF MOTION DEFICIT: ICD-10-CM

## 2021-06-01 DIAGNOSIS — R60.0 EDEMA OF HAND: ICD-10-CM

## 2021-06-01 PROCEDURE — 97110 THERAPEUTIC EXERCISES: CPT | Mod: PO

## 2021-06-01 PROCEDURE — 97022 WHIRLPOOL THERAPY: CPT | Mod: PO

## 2021-06-01 PROCEDURE — 97140 MANUAL THERAPY 1/> REGIONS: CPT | Mod: PO

## 2021-06-05 DIAGNOSIS — I48.91 ATRIAL FIBRILLATION, UNSPECIFIED TYPE: Primary | ICD-10-CM

## 2021-06-08 ENCOUNTER — CLINICAL SUPPORT (OUTPATIENT)
Dept: REHABILITATION | Facility: HOSPITAL | Age: 56
End: 2021-06-08
Payer: COMMERCIAL

## 2021-06-08 DIAGNOSIS — M25.60 RANGE OF MOTION DEFICIT: ICD-10-CM

## 2021-06-08 DIAGNOSIS — R60.0 EDEMA OF HAND: ICD-10-CM

## 2021-06-08 PROCEDURE — 97110 THERAPEUTIC EXERCISES: CPT | Mod: PO

## 2021-06-08 PROCEDURE — 97140 MANUAL THERAPY 1/> REGIONS: CPT | Mod: PO

## 2021-06-08 PROCEDURE — 97022 WHIRLPOOL THERAPY: CPT | Mod: PO

## 2021-06-08 RX ORDER — METOPROLOL SUCCINATE 50 MG/1
50 TABLET, EXTENDED RELEASE ORAL 2 TIMES DAILY
Qty: 180 TABLET | Refills: 3 | Status: SHIPPED | OUTPATIENT
Start: 2021-06-08 | End: 2022-04-29 | Stop reason: SDUPTHER

## 2021-06-14 ENCOUNTER — CLINICAL SUPPORT (OUTPATIENT)
Dept: REHABILITATION | Facility: HOSPITAL | Age: 56
End: 2021-06-14
Payer: COMMERCIAL

## 2021-06-14 DIAGNOSIS — M25.60 RANGE OF MOTION DEFICIT: ICD-10-CM

## 2021-06-14 DIAGNOSIS — R60.0 EDEMA OF HAND: ICD-10-CM

## 2021-06-14 PROCEDURE — 97022 WHIRLPOOL THERAPY: CPT | Mod: PO

## 2021-06-14 PROCEDURE — 97110 THERAPEUTIC EXERCISES: CPT | Mod: PO

## 2021-06-14 PROCEDURE — 97140 MANUAL THERAPY 1/> REGIONS: CPT | Mod: PO

## 2021-06-14 NOTE — PROGRESS NOTES
A PSG was preformed on Vicky Alvarado on the night of 7/11/2020. The procedure was explained to the patient. All questions were asked and answered prior to the start of the study. The patient did not meet split night criteria.    SDB events were noted. Snoring was noted to be mild to moderate.    The EKG appeared to have shown NSR with PVC's and PAC's. The lowest Spo2 noted was 83%.    All sleep stages were reached. Supine REM was noted.    An end of the night instruction sheet was giving to the patient upon leaving the lab.  
yes

## 2021-06-15 ENCOUNTER — TELEPHONE (OUTPATIENT)
Dept: ORTHOPEDICS | Facility: CLINIC | Age: 56
End: 2021-06-15

## 2021-06-16 ENCOUNTER — OFFICE VISIT (OUTPATIENT)
Dept: ORTHOPEDICS | Facility: CLINIC | Age: 56
End: 2021-06-16
Payer: COMMERCIAL

## 2021-06-16 VITALS
HEART RATE: 64 BPM | SYSTOLIC BLOOD PRESSURE: 172 MMHG | WEIGHT: 293 LBS | HEIGHT: 66 IN | BODY MASS INDEX: 47.09 KG/M2 | DIASTOLIC BLOOD PRESSURE: 82 MMHG

## 2021-06-16 DIAGNOSIS — Z98.890 POST-OPERATIVE STATE: Primary | ICD-10-CM

## 2021-06-16 PROBLEM — E78.00 PURE HYPERCHOLESTEROLEMIA: Chronic | Status: ACTIVE | Noted: 2021-06-16

## 2021-06-16 PROBLEM — E11.42 TYPE 2 DIABETES MELLITUS WITH DIABETIC POLYNEUROPATHY, WITHOUT LONG-TERM CURRENT USE OF INSULIN: Chronic | Status: ACTIVE | Noted: 2020-06-03

## 2021-06-16 PROBLEM — E11.9 TYPE 2 DIABETES MELLITUS, WITHOUT LONG-TERM CURRENT USE OF INSULIN: Chronic | Status: ACTIVE | Noted: 2020-06-03

## 2021-06-16 PROCEDURE — 3008F PR BODY MASS INDEX (BMI) DOCUMENTED: ICD-10-PCS | Mod: CPTII,S$GLB,, | Performed by: PHYSICIAN ASSISTANT

## 2021-06-16 PROCEDURE — 99999 PR PBB SHADOW E&M-EST. PATIENT-LVL IV: ICD-10-PCS | Mod: PBBFAC,,, | Performed by: PHYSICIAN ASSISTANT

## 2021-06-16 PROCEDURE — 1125F PR PAIN SEVERITY QUANTIFIED, PAIN PRESENT: ICD-10-PCS | Mod: S$GLB,,, | Performed by: PHYSICIAN ASSISTANT

## 2021-06-16 PROCEDURE — 99999 PR PBB SHADOW E&M-EST. PATIENT-LVL IV: CPT | Mod: PBBFAC,,, | Performed by: PHYSICIAN ASSISTANT

## 2021-06-16 PROCEDURE — 99024 PR POST-OP FOLLOW-UP VISIT: ICD-10-PCS | Mod: S$GLB,,, | Performed by: PHYSICIAN ASSISTANT

## 2021-06-16 PROCEDURE — 3008F BODY MASS INDEX DOCD: CPT | Mod: CPTII,S$GLB,, | Performed by: PHYSICIAN ASSISTANT

## 2021-06-16 PROCEDURE — 1125F AMNT PAIN NOTED PAIN PRSNT: CPT | Mod: S$GLB,,, | Performed by: PHYSICIAN ASSISTANT

## 2021-06-16 PROCEDURE — 99024 POSTOP FOLLOW-UP VISIT: CPT | Mod: S$GLB,,, | Performed by: PHYSICIAN ASSISTANT

## 2021-06-21 ENCOUNTER — CLINICAL SUPPORT (OUTPATIENT)
Dept: REHABILITATION | Facility: HOSPITAL | Age: 56
End: 2021-06-21
Payer: COMMERCIAL

## 2021-06-21 DIAGNOSIS — M25.60 RANGE OF MOTION DEFICIT: ICD-10-CM

## 2021-06-21 DIAGNOSIS — R60.0 EDEMA OF HAND: ICD-10-CM

## 2021-06-21 PROCEDURE — 97110 THERAPEUTIC EXERCISES: CPT | Mod: PO

## 2021-06-21 PROCEDURE — 97022 WHIRLPOOL THERAPY: CPT | Mod: PO

## 2021-06-29 ENCOUNTER — PATIENT MESSAGE (OUTPATIENT)
Dept: ELECTROPHYSIOLOGY | Facility: CLINIC | Age: 56
End: 2021-06-29

## 2021-06-29 ENCOUNTER — OFFICE VISIT (OUTPATIENT)
Dept: ELECTROPHYSIOLOGY | Facility: CLINIC | Age: 56
End: 2021-06-29
Payer: COMMERCIAL

## 2021-06-29 ENCOUNTER — HOSPITAL ENCOUNTER (OUTPATIENT)
Dept: CARDIOLOGY | Facility: CLINIC | Age: 56
Discharge: HOME | End: 2021-06-29
Payer: COMMERCIAL

## 2021-06-29 VITALS
DIASTOLIC BLOOD PRESSURE: 72 MMHG | HEART RATE: 65 BPM | HEIGHT: 66 IN | BODY MASS INDEX: 45.7 KG/M2 | SYSTOLIC BLOOD PRESSURE: 128 MMHG | WEIGHT: 284.38 LBS

## 2021-06-29 DIAGNOSIS — I49.3 PVC (PREMATURE VENTRICULAR CONTRACTION): Primary | ICD-10-CM

## 2021-06-29 DIAGNOSIS — I48.91 ATRIAL FIBRILLATION, UNSPECIFIED TYPE: ICD-10-CM

## 2021-06-29 DIAGNOSIS — I48.0 PAF (PAROXYSMAL ATRIAL FIBRILLATION): ICD-10-CM

## 2021-06-29 DIAGNOSIS — I50.22 CHRONIC SYSTOLIC HEART FAILURE: ICD-10-CM

## 2021-06-29 PROCEDURE — 99999 PR PBB SHADOW E&M-EST. PATIENT-LVL IV: ICD-10-PCS | Mod: PBBFAC,,, | Performed by: INTERNAL MEDICINE

## 2021-06-29 PROCEDURE — 99999 PR PBB SHADOW E&M-EST. PATIENT-LVL IV: CPT | Mod: PBBFAC,,, | Performed by: INTERNAL MEDICINE

## 2021-06-29 PROCEDURE — 1126F AMNT PAIN NOTED NONE PRSNT: CPT | Mod: S$GLB,,, | Performed by: INTERNAL MEDICINE

## 2021-06-29 PROCEDURE — 93010 ELECTROCARDIOGRAM REPORT: CPT | Mod: S$GLB,,, | Performed by: INTERNAL MEDICINE

## 2021-06-29 PROCEDURE — 3008F BODY MASS INDEX DOCD: CPT | Mod: CPTII,S$GLB,, | Performed by: INTERNAL MEDICINE

## 2021-06-29 PROCEDURE — 99214 PR OFFICE/OUTPT VISIT, EST, LEVL IV, 30-39 MIN: ICD-10-PCS | Mod: S$GLB,,, | Performed by: INTERNAL MEDICINE

## 2021-06-29 PROCEDURE — 99214 OFFICE O/P EST MOD 30 MIN: CPT | Mod: S$GLB,,, | Performed by: INTERNAL MEDICINE

## 2021-06-29 PROCEDURE — 93005 ELECTROCARDIOGRAM TRACING: CPT | Mod: S$GLB,,, | Performed by: INTERNAL MEDICINE

## 2021-06-29 PROCEDURE — 93005 RHYTHM STRIP: ICD-10-PCS | Mod: S$GLB,,, | Performed by: INTERNAL MEDICINE

## 2021-06-29 PROCEDURE — 1126F PR PAIN SEVERITY QUANTIFIED, NO PAIN PRESENT: ICD-10-PCS | Mod: S$GLB,,, | Performed by: INTERNAL MEDICINE

## 2021-06-29 PROCEDURE — 93010 RHYTHM STRIP: ICD-10-PCS | Mod: S$GLB,,, | Performed by: INTERNAL MEDICINE

## 2021-06-29 PROCEDURE — 3008F PR BODY MASS INDEX (BMI) DOCUMENTED: ICD-10-PCS | Mod: CPTII,S$GLB,, | Performed by: INTERNAL MEDICINE

## 2021-06-30 ENCOUNTER — HOSPITAL ENCOUNTER (OUTPATIENT)
Dept: CARDIOLOGY | Facility: HOSPITAL | Age: 56
Discharge: HOME | End: 2021-06-30
Attending: INTERNAL MEDICINE
Payer: COMMERCIAL

## 2021-06-30 ENCOUNTER — PATIENT MESSAGE (OUTPATIENT)
Dept: ELECTROPHYSIOLOGY | Facility: CLINIC | Age: 56
End: 2021-06-30

## 2021-06-30 VITALS
DIASTOLIC BLOOD PRESSURE: 60 MMHG | HEIGHT: 66 IN | HEART RATE: 68 BPM | SYSTOLIC BLOOD PRESSURE: 110 MMHG | WEIGHT: 284 LBS | BODY MASS INDEX: 45.64 KG/M2

## 2021-06-30 LAB
ASCENDING AORTA: 3.25 CM
AV INDEX (PROSTH): 0.49
AV MEAN GRADIENT: 17 MMHG
AV PEAK GRADIENT: 28 MMHG
AV VALVE AREA: 1.63 CM2
AV VELOCITY RATIO: 0.47
BSA FOR ECHO PROCEDURE: 2.45 M2
CV ECHO LV RWT: 0.45 CM
DOP CALC AO PEAK VEL: 2.63 M/S
DOP CALC AO VTI: 52.82 CM
DOP CALC LVOT AREA: 3.3 CM2
DOP CALC LVOT DIAMETER: 2.06 CM
DOP CALC LVOT PEAK VEL: 1.23 M/S
DOP CALC LVOT STROKE VOLUME: 86.21 CM3
DOP CALCLVOT PEAK VEL VTI: 25.88 CM
E WAVE DECELERATION TIME: 240.17 MSEC
E/A RATIO: 1.03
E/E' RATIO: 9.89 M/S
ECHO LV POSTERIOR WALL: 1.07 CM (ref 0.6–1.1)
EJECTION FRACTION: 60 %
FRACTIONAL SHORTENING: 35 % (ref 28–44)
INTERVENTRICULAR SEPTUM: 1.17 CM (ref 0.6–1.1)
LA MAJOR: 4.9 CM
LA MINOR: 4.98 CM
LA WIDTH: 3.83 CM
LEFT ATRIUM SIZE: 3.74 CM
LEFT ATRIUM VOLUME INDEX MOD: 21.8 ML/M2
LEFT ATRIUM VOLUME INDEX: 25.9 ML/M2
LEFT ATRIUM VOLUME MOD: 50.68 CM3
LEFT ATRIUM VOLUME: 60.14 CM3
LEFT INTERNAL DIMENSION IN SYSTOLE: 3.09 CM (ref 2.1–4)
LEFT VENTRICLE DIASTOLIC VOLUME INDEX: 45.51 ML/M2
LEFT VENTRICLE DIASTOLIC VOLUME: 105.58 ML
LEFT VENTRICLE MASS INDEX: 85 G/M2
LEFT VENTRICLE SYSTOLIC VOLUME INDEX: 16.3 ML/M2
LEFT VENTRICLE SYSTOLIC VOLUME: 37.72 ML
LEFT VENTRICULAR INTERNAL DIMENSION IN DIASTOLE: 4.76 CM (ref 3.5–6)
LEFT VENTRICULAR MASS: 196.24 G
LV LATERAL E/E' RATIO: 7.42 M/S
LV SEPTAL E/E' RATIO: 14.83 M/S
MV A" WAVE DURATION": 12.18 MSEC
MV PEAK A VEL: 0.86 M/S
MV PEAK E VEL: 0.89 M/S
MV STENOSIS PRESSURE HALF TIME: 69.65 MS
MV VALVE AREA P 1/2 METHOD: 3.16 CM2
PULM VEIN S/D RATIO: 0.93
PV PEAK D VEL: 0.59 M/S
PV PEAK S VEL: 0.55 M/S
RA MAJOR: 3.91 CM
RA PRESSURE: 3 MMHG
RA WIDTH: 2.43 CM
RIGHT VENTRICULAR END-DIASTOLIC DIMENSION: 3.91 CM
RV TISSUE DOPPLER FREE WALL SYSTOLIC VELOCITY 1 (APICAL 4 CHAMBER VIEW): 12.33 CM/S
SINUS: 2.66 CM
STJ: 2.81 CM
TDI LATERAL: 0.12 M/S
TDI SEPTAL: 0.06 M/S
TDI: 0.09 M/S
TRICUSPID ANNULAR PLANE SYSTOLIC EXCURSION: 1.98 CM

## 2021-06-30 PROCEDURE — 93306 ECHO (CUPID ONLY): ICD-10-PCS | Mod: 26,,, | Performed by: INTERNAL MEDICINE

## 2021-06-30 PROCEDURE — 93306 TTE W/DOPPLER COMPLETE: CPT | Mod: 26,,, | Performed by: INTERNAL MEDICINE

## 2021-06-30 PROCEDURE — 93306 TTE W/DOPPLER COMPLETE: CPT

## 2021-07-01 ENCOUNTER — HOSPITAL ENCOUNTER (OUTPATIENT)
Dept: CARDIOLOGY | Facility: HOSPITAL | Age: 56
Discharge: HOME | End: 2021-07-01
Attending: INTERNAL MEDICINE
Payer: COMMERCIAL

## 2021-07-01 DIAGNOSIS — I49.3 PVC (PREMATURE VENTRICULAR CONTRACTION): ICD-10-CM

## 2021-07-01 PROCEDURE — 93225 XTRNL ECG REC<48 HRS REC: CPT

## 2021-07-01 PROCEDURE — 93227 XTRNL ECG REC<48 HR R&I: CPT | Mod: ,,, | Performed by: INTERNAL MEDICINE

## 2021-07-01 PROCEDURE — 93227 HOLTER MONITOR - 24 HOUR (CUPID ONLY): ICD-10-PCS | Mod: ,,, | Performed by: INTERNAL MEDICINE

## 2021-07-06 ENCOUNTER — PATIENT MESSAGE (OUTPATIENT)
Dept: CARDIOLOGY | Facility: CLINIC | Age: 56
End: 2021-07-06

## 2021-07-06 ENCOUNTER — TELEPHONE (OUTPATIENT)
Dept: CARDIOLOGY | Facility: HOSPITAL | Age: 56
End: 2021-07-06

## 2021-07-06 ENCOUNTER — OFFICE VISIT (OUTPATIENT)
Dept: CARDIOLOGY | Facility: CLINIC | Age: 56
End: 2021-07-06
Payer: COMMERCIAL

## 2021-07-06 ENCOUNTER — LAB VISIT (OUTPATIENT)
Dept: LAB | Facility: HOSPITAL | Age: 56
End: 2021-07-06
Payer: COMMERCIAL

## 2021-07-06 VITALS
BODY MASS INDEX: 47.09 KG/M2 | HEART RATE: 62 BPM | SYSTOLIC BLOOD PRESSURE: 155 MMHG | WEIGHT: 293 LBS | HEIGHT: 66 IN | OXYGEN SATURATION: 98 % | DIASTOLIC BLOOD PRESSURE: 67 MMHG

## 2021-07-06 DIAGNOSIS — E78.00 PURE HYPERCHOLESTEROLEMIA: Chronic | ICD-10-CM

## 2021-07-06 DIAGNOSIS — G47.33 OSA (OBSTRUCTIVE SLEEP APNEA): ICD-10-CM

## 2021-07-06 DIAGNOSIS — I10 ESSENTIAL HYPERTENSION: Primary | Chronic | ICD-10-CM

## 2021-07-06 DIAGNOSIS — E66.01 SEVERE OBESITY (BMI >= 40): ICD-10-CM

## 2021-07-06 DIAGNOSIS — I35.0 MILD AORTIC STENOSIS: Chronic | ICD-10-CM

## 2021-07-06 DIAGNOSIS — D50.9 MICROCYTIC ANEMIA: ICD-10-CM

## 2021-07-06 DIAGNOSIS — D50.9 MICROCYTIC ANEMIA: Primary | ICD-10-CM

## 2021-07-06 DIAGNOSIS — E11.42 TYPE 2 DIABETES MELLITUS WITH DIABETIC POLYNEUROPATHY, WITHOUT LONG-TERM CURRENT USE OF INSULIN: Chronic | ICD-10-CM

## 2021-07-06 DIAGNOSIS — I10 ESSENTIAL HYPERTENSION: Chronic | ICD-10-CM

## 2021-07-06 DIAGNOSIS — I48.0 PAF (PAROXYSMAL ATRIAL FIBRILLATION): ICD-10-CM

## 2021-07-06 LAB
ANION GAP SERPL CALC-SCNC: 10 MMOL/L (ref 8–16)
BUN SERPL-MCNC: 17 MG/DL (ref 6–20)
CALCIUM SERPL-MCNC: 9.9 MG/DL (ref 8.7–10.5)
CHLORIDE SERPL-SCNC: 99 MMOL/L (ref 95–110)
CHOLEST SERPL-MCNC: 164 MG/DL (ref 120–199)
CHOLEST/HDLC SERPL: 3.1 {RATIO} (ref 2–5)
CO2 SERPL-SCNC: 27 MMOL/L (ref 23–29)
CREAT SERPL-MCNC: 1 MG/DL (ref 0.5–1.4)
ERYTHROCYTE [DISTWIDTH] IN BLOOD BY AUTOMATED COUNT: 18.3 % (ref 11.5–14.5)
EST. GFR  (AFRICAN AMERICAN): >60 ML/MIN/1.73 M^2
EST. GFR  (NON AFRICAN AMERICAN): >60 ML/MIN/1.73 M^2
FERRITIN SERPL-MCNC: 22 NG/ML (ref 20–300)
GLUCOSE SERPL-MCNC: 109 MG/DL (ref 70–110)
HCT VFR BLD AUTO: 35.7 % (ref 37–48.5)
HDLC SERPL-MCNC: 53 MG/DL (ref 40–75)
HDLC SERPL: 32.3 % (ref 20–50)
HGB BLD-MCNC: 10.9 G/DL (ref 12–16)
IRON SERPL-MCNC: 54 UG/DL (ref 30–160)
LDLC SERPL CALC-MCNC: 92.6 MG/DL (ref 63–159)
MCH RBC QN AUTO: 26.8 PG (ref 27–31)
MCHC RBC AUTO-ENTMCNC: 30.5 G/DL (ref 32–36)
MCV RBC AUTO: 88 FL (ref 82–98)
NONHDLC SERPL-MCNC: 111 MG/DL
OHS CV EVENT MONITOR DAY: 0
OHS CV HOLTER LENGTH DECIMAL HOURS: 24
OHS CV HOLTER LENGTH HOURS: 24
OHS CV HOLTER LENGTH MINUTES: 0
PLATELET # BLD AUTO: 218 K/UL (ref 150–450)
PMV BLD AUTO: 11 FL (ref 9.2–12.9)
POTASSIUM SERPL-SCNC: 4.5 MMOL/L (ref 3.5–5.1)
RBC # BLD AUTO: 4.07 M/UL (ref 4–5.4)
RETICS/RBC NFR AUTO: 2.4 % (ref 0.5–2.5)
SATURATED IRON: 12 % (ref 20–50)
SODIUM SERPL-SCNC: 136 MMOL/L (ref 136–145)
TOTAL IRON BINDING CAPACITY: 466 UG/DL (ref 250–450)
TRANSFERRIN SERPL-MCNC: 315 MG/DL (ref 200–375)
TRIGL SERPL-MCNC: 92 MG/DL (ref 30–150)
WBC # BLD AUTO: 6.95 K/UL (ref 3.9–12.7)

## 2021-07-06 PROCEDURE — 85027 COMPLETE CBC AUTOMATED: CPT | Performed by: INTERNAL MEDICINE

## 2021-07-06 PROCEDURE — 80048 BASIC METABOLIC PNL TOTAL CA: CPT | Performed by: INTERNAL MEDICINE

## 2021-07-06 PROCEDURE — 36415 COLL VENOUS BLD VENIPUNCTURE: CPT | Performed by: INTERNAL MEDICINE

## 2021-07-06 PROCEDURE — 99999 PR PBB SHADOW E&M-EST. PATIENT-LVL IV: CPT | Mod: PBBFAC,,, | Performed by: INTERNAL MEDICINE

## 2021-07-06 PROCEDURE — 83540 ASSAY OF IRON: CPT | Performed by: INTERNAL MEDICINE

## 2021-07-06 PROCEDURE — 82728 ASSAY OF FERRITIN: CPT | Performed by: INTERNAL MEDICINE

## 2021-07-06 PROCEDURE — 85045 AUTOMATED RETICULOCYTE COUNT: CPT | Performed by: INTERNAL MEDICINE

## 2021-07-06 PROCEDURE — 99999 PR PBB SHADOW E&M-EST. PATIENT-LVL IV: ICD-10-PCS | Mod: PBBFAC,,, | Performed by: INTERNAL MEDICINE

## 2021-07-06 PROCEDURE — 99215 PR OFFICE/OUTPT VISIT, EST, LEVL V, 40-54 MIN: ICD-10-PCS | Mod: S$GLB,,, | Performed by: INTERNAL MEDICINE

## 2021-07-06 PROCEDURE — 80061 LIPID PANEL: CPT | Performed by: INTERNAL MEDICINE

## 2021-07-06 PROCEDURE — 99215 OFFICE O/P EST HI 40 MIN: CPT | Mod: S$GLB,,, | Performed by: INTERNAL MEDICINE

## 2021-07-06 RX ORDER — FUROSEMIDE 40 MG/1
40 TABLET ORAL 2 TIMES DAILY
Qty: 60 TABLET | Refills: 11 | Status: ON HOLD | OUTPATIENT
Start: 2021-07-06 | End: 2022-02-27 | Stop reason: SDUPTHER

## 2021-07-06 RX ORDER — NIFEDIPINE 90 MG/1
90 TABLET, EXTENDED RELEASE ORAL DAILY
Qty: 30 TABLET | Refills: 11 | Status: ON HOLD | OUTPATIENT
Start: 2021-07-06 | End: 2022-02-27 | Stop reason: SDUPTHER

## 2021-07-06 RX ORDER — ATORVASTATIN CALCIUM 40 MG/1
40 TABLET, FILM COATED ORAL DAILY
Qty: 90 TABLET | Refills: 3 | Status: SHIPPED | OUTPATIENT
Start: 2021-07-06 | End: 2022-09-19

## 2021-07-06 RX ORDER — POTASSIUM CHLORIDE 20 MEQ/1
20 TABLET, EXTENDED RELEASE ORAL DAILY
Qty: 30 TABLET | Refills: 11 | Status: ON HOLD | OUTPATIENT
Start: 2021-07-06 | End: 2022-02-27 | Stop reason: SDUPTHER

## 2021-07-06 RX ORDER — DULOXETIN HYDROCHLORIDE 60 MG/1
120 CAPSULE, DELAYED RELEASE ORAL DAILY
Qty: 180 CAPSULE | Refills: 3 | OUTPATIENT
Start: 2021-07-06

## 2021-07-06 RX ORDER — TRAZODONE HYDROCHLORIDE 100 MG/1
100 TABLET ORAL NIGHTLY PRN
Qty: 90 TABLET | Refills: 3 | OUTPATIENT
Start: 2021-07-06

## 2021-07-08 ENCOUNTER — PATIENT MESSAGE (OUTPATIENT)
Dept: CARDIOLOGY | Facility: CLINIC | Age: 56
End: 2021-07-08

## 2021-07-09 ENCOUNTER — HOSPITAL ENCOUNTER (OUTPATIENT)
Dept: PULMONOLOGY | Facility: CLINIC | Age: 56
Discharge: HOME | End: 2021-07-09
Payer: COMMERCIAL

## 2021-07-09 VITALS — WEIGHT: 293 LBS | BODY MASS INDEX: 47.09 KG/M2 | HEIGHT: 66 IN

## 2021-07-09 DIAGNOSIS — E66.01 SEVERE OBESITY (BMI >= 40): Primary | ICD-10-CM

## 2021-07-09 DIAGNOSIS — I48.0 PAF (PAROXYSMAL ATRIAL FIBRILLATION): ICD-10-CM

## 2021-07-09 DIAGNOSIS — D50.9 MICROCYTIC ANEMIA: ICD-10-CM

## 2021-07-09 PROCEDURE — 94618 PULMONARY STRESS TESTING: ICD-10-PCS | Mod: S$GLB,,, | Performed by: INTERNAL MEDICINE

## 2021-07-09 PROCEDURE — 94618 PULMONARY STRESS TESTING: CPT | Mod: S$GLB,,, | Performed by: INTERNAL MEDICINE

## 2021-07-13 ENCOUNTER — LAB VISIT (OUTPATIENT)
Dept: LAB | Facility: HOSPITAL | Age: 56
End: 2021-07-13
Payer: COMMERCIAL

## 2021-07-13 DIAGNOSIS — R53.83 FATIGUE, UNSPECIFIED TYPE: ICD-10-CM

## 2021-07-13 DIAGNOSIS — E11.42 TYPE 2 DIABETES MELLITUS WITH DIABETIC POLYNEUROPATHY, WITHOUT LONG-TERM CURRENT USE OF INSULIN: Chronic | ICD-10-CM

## 2021-07-13 DIAGNOSIS — I48.0 PAF (PAROXYSMAL ATRIAL FIBRILLATION): ICD-10-CM

## 2021-07-13 DIAGNOSIS — E66.01 SEVERE OBESITY (BMI >= 40): ICD-10-CM

## 2021-07-13 LAB
ANION GAP SERPL CALC-SCNC: 14 MMOL/L (ref 8–16)
BUN SERPL-MCNC: 18 MG/DL (ref 6–20)
CALCIUM SERPL-MCNC: 10.2 MG/DL (ref 8.7–10.5)
CHLORIDE SERPL-SCNC: 100 MMOL/L (ref 95–110)
CO2 SERPL-SCNC: 26 MMOL/L (ref 23–29)
CREAT SERPL-MCNC: 1 MG/DL (ref 0.5–1.4)
EST. GFR  (AFRICAN AMERICAN): >60 ML/MIN/1.73 M^2
EST. GFR  (NON AFRICAN AMERICAN): >60 ML/MIN/1.73 M^2
ESTIMATED AVG GLUCOSE: 137 MG/DL (ref 68–131)
GLUCOSE SERPL-MCNC: 128 MG/DL (ref 70–110)
HBA1C MFR BLD: 6.4 % (ref 4–5.6)
POTASSIUM SERPL-SCNC: 4.7 MMOL/L (ref 3.5–5.1)
SODIUM SERPL-SCNC: 140 MMOL/L (ref 136–145)
T4 FREE SERPL-MCNC: 1.03 NG/DL (ref 0.71–1.51)
TSH SERPL DL<=0.005 MIU/L-ACNC: 1.74 UIU/ML (ref 0.4–4)

## 2021-07-13 PROCEDURE — 80048 BASIC METABOLIC PNL TOTAL CA: CPT | Performed by: INTERNAL MEDICINE

## 2021-07-13 PROCEDURE — 83036 HEMOGLOBIN GLYCOSYLATED A1C: CPT | Performed by: INTERNAL MEDICINE

## 2021-07-13 PROCEDURE — 36415 COLL VENOUS BLD VENIPUNCTURE: CPT | Performed by: INTERNAL MEDICINE

## 2021-07-13 PROCEDURE — 84439 ASSAY OF FREE THYROXINE: CPT | Performed by: INTERNAL MEDICINE

## 2021-07-13 PROCEDURE — 84443 ASSAY THYROID STIM HORMONE: CPT | Performed by: INTERNAL MEDICINE

## 2021-07-19 ENCOUNTER — PATIENT MESSAGE (OUTPATIENT)
Dept: ELECTROPHYSIOLOGY | Facility: CLINIC | Age: 56
End: 2021-07-19

## 2021-07-21 ENCOUNTER — DOCUMENTATION ONLY (OUTPATIENT)
Dept: BARIATRICS | Facility: CLINIC | Age: 56
End: 2021-07-21

## 2021-07-22 ENCOUNTER — TELEPHONE (OUTPATIENT)
Dept: BARIATRICS | Facility: CLINIC | Age: 56
End: 2021-07-22

## 2021-07-26 ENCOUNTER — OFFICE VISIT (OUTPATIENT)
Dept: GASTROENTEROLOGY | Facility: CLINIC | Age: 56
End: 2021-07-26
Payer: COMMERCIAL

## 2021-07-26 ENCOUNTER — PATIENT MESSAGE (OUTPATIENT)
Dept: ELECTROPHYSIOLOGY | Facility: CLINIC | Age: 56
End: 2021-07-26

## 2021-07-26 VITALS
BODY MASS INDEX: 47.09 KG/M2 | DIASTOLIC BLOOD PRESSURE: 79 MMHG | HEART RATE: 76 BPM | SYSTOLIC BLOOD PRESSURE: 127 MMHG | WEIGHT: 293 LBS | HEIGHT: 66 IN

## 2021-07-26 DIAGNOSIS — K21.00 GASTROESOPHAGEAL REFLUX DISEASE WITH ESOPHAGITIS, UNSPECIFIED WHETHER HEMORRHAGE: ICD-10-CM

## 2021-07-26 DIAGNOSIS — D64.9 ANEMIA, UNSPECIFIED TYPE: ICD-10-CM

## 2021-07-26 DIAGNOSIS — Z12.11 SCREENING FOR COLON CANCER: Primary | ICD-10-CM

## 2021-07-26 PROCEDURE — 1159F PR MEDICATION LIST DOCUMENTED IN MEDICAL RECORD: ICD-10-PCS | Mod: CPTII,S$GLB,, | Performed by: NURSE PRACTITIONER

## 2021-07-26 PROCEDURE — 3044F PR MOST RECENT HEMOGLOBIN A1C LEVEL <7.0%: ICD-10-PCS | Mod: CPTII,S$GLB,, | Performed by: NURSE PRACTITIONER

## 2021-07-26 PROCEDURE — 99999 PR PBB SHADOW E&M-EST. PATIENT-LVL IV: CPT | Mod: PBBFAC,,, | Performed by: NURSE PRACTITIONER

## 2021-07-26 PROCEDURE — 1159F MED LIST DOCD IN RCRD: CPT | Mod: CPTII,S$GLB,, | Performed by: NURSE PRACTITIONER

## 2021-07-26 PROCEDURE — 99999 PR PBB SHADOW E&M-EST. PATIENT-LVL IV: ICD-10-PCS | Mod: PBBFAC,,, | Performed by: NURSE PRACTITIONER

## 2021-07-26 PROCEDURE — 99205 PR OFFICE/OUTPT VISIT, NEW, LEVL V, 60-74 MIN: ICD-10-PCS | Mod: S$GLB,,, | Performed by: NURSE PRACTITIONER

## 2021-07-26 PROCEDURE — 3074F SYST BP LT 130 MM HG: CPT | Mod: CPTII,S$GLB,, | Performed by: NURSE PRACTITIONER

## 2021-07-26 PROCEDURE — 3078F PR MOST RECENT DIASTOLIC BLOOD PRESSURE < 80 MM HG: ICD-10-PCS | Mod: CPTII,S$GLB,, | Performed by: NURSE PRACTITIONER

## 2021-07-26 PROCEDURE — 3078F DIAST BP <80 MM HG: CPT | Mod: CPTII,S$GLB,, | Performed by: NURSE PRACTITIONER

## 2021-07-26 PROCEDURE — 1126F AMNT PAIN NOTED NONE PRSNT: CPT | Mod: CPTII,S$GLB,, | Performed by: NURSE PRACTITIONER

## 2021-07-26 PROCEDURE — 3044F HG A1C LEVEL LT 7.0%: CPT | Mod: CPTII,S$GLB,, | Performed by: NURSE PRACTITIONER

## 2021-07-26 PROCEDURE — 99205 OFFICE O/P NEW HI 60 MIN: CPT | Mod: S$GLB,,, | Performed by: NURSE PRACTITIONER

## 2021-07-26 PROCEDURE — 3074F PR MOST RECENT SYSTOLIC BLOOD PRESSURE < 130 MM HG: ICD-10-PCS | Mod: CPTII,S$GLB,, | Performed by: NURSE PRACTITIONER

## 2021-07-26 PROCEDURE — 1126F PR PAIN SEVERITY QUANTIFIED, NO PAIN PRESENT: ICD-10-PCS | Mod: CPTII,S$GLB,, | Performed by: NURSE PRACTITIONER

## 2021-07-26 PROCEDURE — 3008F BODY MASS INDEX DOCD: CPT | Mod: CPTII,S$GLB,, | Performed by: NURSE PRACTITIONER

## 2021-07-26 PROCEDURE — 3008F PR BODY MASS INDEX (BMI) DOCUMENTED: ICD-10-PCS | Mod: CPTII,S$GLB,, | Performed by: NURSE PRACTITIONER

## 2021-07-26 RX ORDER — SODIUM, POTASSIUM,MAG SULFATES 17.5-3.13G
1 SOLUTION, RECONSTITUTED, ORAL ORAL DAILY
Qty: 1 KIT | Refills: 0 | Status: SHIPPED | OUTPATIENT
Start: 2021-07-26 | End: 2021-10-28

## 2021-07-27 ENCOUNTER — TELEPHONE (OUTPATIENT)
Dept: ELECTROPHYSIOLOGY | Facility: CLINIC | Age: 56
End: 2021-07-27

## 2021-07-27 ENCOUNTER — TELEPHONE (OUTPATIENT)
Dept: GASTROENTEROLOGY | Facility: CLINIC | Age: 56
End: 2021-07-27

## 2021-07-29 ENCOUNTER — TELEPHONE (OUTPATIENT)
Dept: GASTROENTEROLOGY | Facility: CLINIC | Age: 56
End: 2021-07-29

## 2021-08-02 ENCOUNTER — OFFICE VISIT (OUTPATIENT)
Dept: SLEEP MEDICINE | Facility: CLINIC | Age: 56
End: 2021-08-02
Payer: COMMERCIAL

## 2021-08-02 DIAGNOSIS — G47.33 OSA (OBSTRUCTIVE SLEEP APNEA): Primary | ICD-10-CM

## 2021-08-02 PROCEDURE — 1160F PR REVIEW ALL MEDS BY PRESCRIBER/CLIN PHARMACIST DOCUMENTED: ICD-10-PCS | Mod: CPTII,,, | Performed by: PSYCHIATRY & NEUROLOGY

## 2021-08-02 PROCEDURE — 99214 OFFICE O/P EST MOD 30 MIN: CPT | Mod: 95,,, | Performed by: PSYCHIATRY & NEUROLOGY

## 2021-08-02 PROCEDURE — 3044F HG A1C LEVEL LT 7.0%: CPT | Mod: CPTII,,, | Performed by: PSYCHIATRY & NEUROLOGY

## 2021-08-02 PROCEDURE — 99214 PR OFFICE/OUTPT VISIT, EST, LEVL IV, 30-39 MIN: ICD-10-PCS | Mod: 95,,, | Performed by: PSYCHIATRY & NEUROLOGY

## 2021-08-02 PROCEDURE — 1159F MED LIST DOCD IN RCRD: CPT | Mod: CPTII,,, | Performed by: PSYCHIATRY & NEUROLOGY

## 2021-08-02 PROCEDURE — 1160F RVW MEDS BY RX/DR IN RCRD: CPT | Mod: CPTII,,, | Performed by: PSYCHIATRY & NEUROLOGY

## 2021-08-02 PROCEDURE — 3044F PR MOST RECENT HEMOGLOBIN A1C LEVEL <7.0%: ICD-10-PCS | Mod: CPTII,,, | Performed by: PSYCHIATRY & NEUROLOGY

## 2021-08-02 PROCEDURE — 1159F PR MEDICATION LIST DOCUMENTED IN MEDICAL RECORD: ICD-10-PCS | Mod: CPTII,,, | Performed by: PSYCHIATRY & NEUROLOGY

## 2021-08-04 ENCOUNTER — TELEPHONE (OUTPATIENT)
Dept: GASTROENTEROLOGY | Facility: CLINIC | Age: 56
End: 2021-08-04

## 2021-08-04 DIAGNOSIS — Z01.812 PRE-PROCEDURE LAB EXAM: ICD-10-CM

## 2021-08-10 ENCOUNTER — OFFICE VISIT (OUTPATIENT)
Dept: CARDIOLOGY | Facility: CLINIC | Age: 56
End: 2021-08-10
Payer: COMMERCIAL

## 2021-08-10 VITALS
WEIGHT: 293 LBS | BODY MASS INDEX: 47.09 KG/M2 | DIASTOLIC BLOOD PRESSURE: 88 MMHG | SYSTOLIC BLOOD PRESSURE: 181 MMHG | HEIGHT: 66 IN | OXYGEN SATURATION: 98 % | HEART RATE: 73 BPM

## 2021-08-10 DIAGNOSIS — D50.9 MICROCYTIC ANEMIA: ICD-10-CM

## 2021-08-10 DIAGNOSIS — E11.42 TYPE 2 DIABETES MELLITUS WITH DIABETIC POLYNEUROPATHY, WITHOUT LONG-TERM CURRENT USE OF INSULIN: Chronic | ICD-10-CM

## 2021-08-10 DIAGNOSIS — G47.33 OSA (OBSTRUCTIVE SLEEP APNEA): ICD-10-CM

## 2021-08-10 DIAGNOSIS — E78.00 PURE HYPERCHOLESTEROLEMIA: Chronic | ICD-10-CM

## 2021-08-10 DIAGNOSIS — I10 ESSENTIAL HYPERTENSION: Primary | Chronic | ICD-10-CM

## 2021-08-10 DIAGNOSIS — E66.01 SEVERE OBESITY (BMI >= 40): ICD-10-CM

## 2021-08-10 DIAGNOSIS — I48.0 PAF (PAROXYSMAL ATRIAL FIBRILLATION): ICD-10-CM

## 2021-08-10 PROCEDURE — 99999 PR PBB SHADOW E&M-EST. PATIENT-LVL V: ICD-10-PCS | Mod: PBBFAC,,, | Performed by: INTERNAL MEDICINE

## 2021-08-10 PROCEDURE — 99999 PR PBB SHADOW E&M-EST. PATIENT-LVL V: CPT | Mod: PBBFAC,,, | Performed by: INTERNAL MEDICINE

## 2021-08-10 PROCEDURE — 99215 OFFICE O/P EST HI 40 MIN: CPT | Mod: S$GLB,,, | Performed by: INTERNAL MEDICINE

## 2021-08-10 PROCEDURE — 99215 PR OFFICE/OUTPT VISIT, EST, LEVL V, 40-54 MIN: ICD-10-PCS | Mod: S$GLB,,, | Performed by: INTERNAL MEDICINE

## 2021-08-11 ENCOUNTER — PATIENT MESSAGE (OUTPATIENT)
Dept: ELECTROPHYSIOLOGY | Facility: CLINIC | Age: 56
End: 2021-08-11

## 2021-08-13 ENCOUNTER — TELEPHONE (OUTPATIENT)
Dept: ENDOSCOPY | Facility: HOSPITAL | Age: 56
End: 2021-08-13

## 2021-08-14 ENCOUNTER — LAB VISIT (OUTPATIENT)
Dept: SPORTS MEDICINE | Facility: CLINIC | Age: 56
End: 2021-08-14
Payer: COMMERCIAL

## 2021-08-14 ENCOUNTER — PATIENT MESSAGE (OUTPATIENT)
Dept: ENDOSCOPY | Facility: HOSPITAL | Age: 56
End: 2021-08-14

## 2021-08-14 DIAGNOSIS — Z01.812 PRE-PROCEDURE LAB EXAM: ICD-10-CM

## 2021-08-14 PROCEDURE — U0003 INFECTIOUS AGENT DETECTION BY NUCLEIC ACID (DNA OR RNA); SEVERE ACUTE RESPIRATORY SYNDROME CORONAVIRUS 2 (SARS-COV-2) (CORONAVIRUS DISEASE [COVID-19]), AMPLIFIED PROBE TECHNIQUE, MAKING USE OF HIGH THROUGHPUT TECHNOLOGIES AS DESCRIBED BY CMS-2020-01-R: HCPCS | Performed by: INTERNAL MEDICINE

## 2021-08-14 PROCEDURE — U0005 INFEC AGEN DETEC AMPLI PROBE: HCPCS | Performed by: INTERNAL MEDICINE

## 2021-08-15 LAB
SARS-COV-2 RNA RESP QL NAA+PROBE: NOT DETECTED
SARS-COV-2- CYCLE NUMBER: -1

## 2021-08-16 ENCOUNTER — TELEPHONE (OUTPATIENT)
Dept: ENDOSCOPY | Facility: HOSPITAL | Age: 56
End: 2021-08-16

## 2021-08-17 ENCOUNTER — ANESTHESIA (OUTPATIENT)
Dept: ENDOSCOPY | Facility: HOSPITAL | Age: 56
End: 2021-08-17
Payer: COMMERCIAL

## 2021-08-17 ENCOUNTER — ANESTHESIA EVENT (OUTPATIENT)
Dept: ENDOSCOPY | Facility: HOSPITAL | Age: 56
End: 2021-08-17
Payer: COMMERCIAL

## 2021-08-17 ENCOUNTER — HOSPITAL ENCOUNTER (OUTPATIENT)
Facility: HOSPITAL | Age: 56
Discharge: HOME | End: 2021-08-17
Attending: INTERNAL MEDICINE | Admitting: INTERNAL MEDICINE
Payer: COMMERCIAL

## 2021-08-17 VITALS
BODY MASS INDEX: 47.09 KG/M2 | OXYGEN SATURATION: 97 % | HEART RATE: 70 BPM | SYSTOLIC BLOOD PRESSURE: 121 MMHG | WEIGHT: 293 LBS | DIASTOLIC BLOOD PRESSURE: 94 MMHG | RESPIRATION RATE: 20 BRPM | TEMPERATURE: 98 F | HEIGHT: 66 IN

## 2021-08-17 DIAGNOSIS — Z12.11 COLON CANCER SCREENING: ICD-10-CM

## 2021-08-17 DIAGNOSIS — D50.9 MICROCYTIC ANEMIA: Primary | ICD-10-CM

## 2021-08-17 PROCEDURE — 25000003 PHARM REV CODE 250: Performed by: INTERNAL MEDICINE

## 2021-08-17 PROCEDURE — 63600175 PHARM REV CODE 636 W HCPCS: Performed by: NURSE ANESTHETIST, CERTIFIED REGISTERED

## 2021-08-17 PROCEDURE — 88305 TISSUE EXAM BY PATHOLOGIST: CPT | Performed by: PATHOLOGY

## 2021-08-17 PROCEDURE — 27201012 HC FORCEPS, HOT/COLD, DISP: Performed by: INTERNAL MEDICINE

## 2021-08-17 PROCEDURE — 43239 EGD BIOPSY SINGLE/MULTIPLE: CPT | Performed by: INTERNAL MEDICINE

## 2021-08-17 PROCEDURE — G0121 COLON CA SCRN NOT HI RSK IND: HCPCS | Mod: ,,, | Performed by: INTERNAL MEDICINE

## 2021-08-17 PROCEDURE — 43239 PR EGD, FLEX, W/BIOPSY, SGL/MULTI: ICD-10-PCS | Mod: 51,,, | Performed by: INTERNAL MEDICINE

## 2021-08-17 PROCEDURE — 43239 EGD BIOPSY SINGLE/MULTIPLE: CPT | Mod: 51,,, | Performed by: INTERNAL MEDICINE

## 2021-08-17 PROCEDURE — 37000008 HC ANESTHESIA 1ST 15 MINUTES: Performed by: INTERNAL MEDICINE

## 2021-08-17 PROCEDURE — 37000009 HC ANESTHESIA EA ADD 15 MINS: Performed by: INTERNAL MEDICINE

## 2021-08-17 PROCEDURE — 88305 TISSUE EXAM BY PATHOLOGIST: ICD-10-PCS | Mod: 26,,, | Performed by: PATHOLOGY

## 2021-08-17 PROCEDURE — G0121 COLON CA SCRN NOT HI RSK IND: HCPCS | Performed by: INTERNAL MEDICINE

## 2021-08-17 PROCEDURE — G0121 COLON CA SCRN NOT HI RSK IND: ICD-10-PCS | Mod: ,,, | Performed by: INTERNAL MEDICINE

## 2021-08-17 PROCEDURE — 88305 TISSUE EXAM BY PATHOLOGIST: CPT | Mod: 26,,, | Performed by: PATHOLOGY

## 2021-08-17 RX ORDER — SODIUM CHLORIDE 9 MG/ML
INJECTION, SOLUTION INTRAVENOUS CONTINUOUS
Status: DISCONTINUED | OUTPATIENT
Start: 2021-08-17 | End: 2021-08-17 | Stop reason: HOSPADM

## 2021-08-17 RX ORDER — PROPOFOL 10 MG/ML
VIAL (ML) INTRAVENOUS
Status: DISCONTINUED | OUTPATIENT
Start: 2021-08-17 | End: 2021-08-17

## 2021-08-17 RX ORDER — PHENYLEPHRINE HYDROCHLORIDE 10 MG/ML
INJECTION INTRAVENOUS
Status: DISCONTINUED | OUTPATIENT
Start: 2021-08-17 | End: 2021-08-17

## 2021-08-17 RX ORDER — FENTANYL CITRATE 50 UG/ML
INJECTION, SOLUTION INTRAMUSCULAR; INTRAVENOUS
Status: DISCONTINUED | OUTPATIENT
Start: 2021-08-17 | End: 2021-08-17

## 2021-08-17 RX ORDER — PROPOFOL 10 MG/ML
VIAL (ML) INTRAVENOUS CONTINUOUS PRN
Status: DISCONTINUED | OUTPATIENT
Start: 2021-08-17 | End: 2021-08-17

## 2021-08-17 RX ORDER — SODIUM CHLORIDE 0.9 % (FLUSH) 0.9 %
10 SYRINGE (ML) INJECTION
Status: DISCONTINUED | OUTPATIENT
Start: 2021-08-17 | End: 2021-08-17 | Stop reason: HOSPADM

## 2021-08-17 RX ORDER — MIDAZOLAM HYDROCHLORIDE 1 MG/ML
INJECTION, SOLUTION INTRAMUSCULAR; INTRAVENOUS
Status: DISCONTINUED | OUTPATIENT
Start: 2021-08-17 | End: 2021-08-17

## 2021-08-17 RX ADMIN — PROPOFOL 150 MCG/KG/MIN: 10 INJECTION, EMULSION INTRAVENOUS at 12:08

## 2021-08-17 RX ADMIN — PHENYLEPHRINE HYDROCHLORIDE 100 MCG: 10 INJECTION INTRAVENOUS at 12:08

## 2021-08-17 RX ADMIN — SODIUM CHLORIDE: 0.9 INJECTION, SOLUTION INTRAVENOUS at 12:08

## 2021-08-17 RX ADMIN — PROPOFOL 20 MG: 10 INJECTION, EMULSION INTRAVENOUS at 12:08

## 2021-08-17 RX ADMIN — FENTANYL CITRATE 25 MCG: 50 INJECTION, SOLUTION INTRAMUSCULAR; INTRAVENOUS at 12:08

## 2021-08-17 RX ADMIN — MIDAZOLAM 2 MG: 1 INJECTION INTRAMUSCULAR; INTRAVENOUS at 12:08

## 2021-08-17 RX ADMIN — PROPOFOL 80 MG: 10 INJECTION, EMULSION INTRAVENOUS at 12:08

## 2021-08-18 ENCOUNTER — TELEPHONE (OUTPATIENT)
Dept: ENDOSCOPY | Facility: HOSPITAL | Age: 56
End: 2021-08-18

## 2021-08-19 LAB
FINAL PATHOLOGIC DIAGNOSIS: NORMAL
GROSS: NORMAL
Lab: NORMAL

## 2021-09-07 ENCOUNTER — TELEPHONE (OUTPATIENT)
Dept: GASTROENTEROLOGY | Facility: CLINIC | Age: 56
End: 2021-09-07

## 2021-09-10 ENCOUNTER — TELEPHONE (OUTPATIENT)
Dept: RESEARCH | Facility: HOSPITAL | Age: 56
End: 2021-09-10

## 2021-09-11 ENCOUNTER — IMMUNIZATION (OUTPATIENT)
Dept: INTERNAL MEDICINE | Facility: CLINIC | Age: 56
End: 2021-09-11
Payer: COMMERCIAL

## 2021-09-11 DIAGNOSIS — Z23 NEED FOR VACCINATION: Primary | ICD-10-CM

## 2021-09-11 PROCEDURE — 91300 COVID-19, MRNA, LNP-S, PF, 30 MCG/0.3 ML DOSE VACCINE: CPT | Mod: ,,, | Performed by: INTERNAL MEDICINE

## 2021-09-11 PROCEDURE — 0003A COVID-19, MRNA, LNP-S, PF, 30 MCG/0.3 ML DOSE VACCINE: ICD-10-PCS | Mod: CV19,,, | Performed by: INTERNAL MEDICINE

## 2021-09-11 PROCEDURE — 0003A COVID-19, MRNA, LNP-S, PF, 30 MCG/0.3 ML DOSE VACCINE: CPT | Mod: CV19,,, | Performed by: INTERNAL MEDICINE

## 2021-09-11 PROCEDURE — 91300 COVID-19, MRNA, LNP-S, PF, 30 MCG/0.3 ML DOSE VACCINE: ICD-10-PCS | Mod: ,,, | Performed by: INTERNAL MEDICINE

## 2021-09-21 ENCOUNTER — OFFICE VISIT (OUTPATIENT)
Dept: ELECTROPHYSIOLOGY | Facility: CLINIC | Age: 56
End: 2021-09-21
Payer: COMMERCIAL

## 2021-09-21 ENCOUNTER — HOSPITAL ENCOUNTER (OUTPATIENT)
Dept: CARDIOLOGY | Facility: CLINIC | Age: 56
Discharge: HOME | End: 2021-09-21
Payer: COMMERCIAL

## 2021-09-21 VITALS — BODY MASS INDEX: 47.09 KG/M2 | WEIGHT: 293 LBS | HEIGHT: 66 IN

## 2021-09-21 DIAGNOSIS — I48.91 ATRIAL FIBRILLATION, UNSPECIFIED TYPE: ICD-10-CM

## 2021-09-21 DIAGNOSIS — I48.0 PAF (PAROXYSMAL ATRIAL FIBRILLATION): Primary | ICD-10-CM

## 2021-09-21 PROCEDURE — 99999 PR PBB SHADOW E&M-EST. PATIENT-LVL III: CPT | Mod: PBBFAC,,, | Performed by: INTERNAL MEDICINE

## 2021-09-21 PROCEDURE — 1159F PR MEDICATION LIST DOCUMENTED IN MEDICAL RECORD: ICD-10-PCS | Mod: CPTII,S$GLB,, | Performed by: INTERNAL MEDICINE

## 2021-09-21 PROCEDURE — 93010 ELECTROCARDIOGRAM REPORT: CPT | Mod: S$GLB,,, | Performed by: INTERNAL MEDICINE

## 2021-09-21 PROCEDURE — 3044F HG A1C LEVEL LT 7.0%: CPT | Mod: CPTII,S$GLB,, | Performed by: INTERNAL MEDICINE

## 2021-09-21 PROCEDURE — 93010 RHYTHM STRIP: ICD-10-PCS | Mod: S$GLB,,, | Performed by: INTERNAL MEDICINE

## 2021-09-21 PROCEDURE — 1160F PR REVIEW ALL MEDS BY PRESCRIBER/CLIN PHARMACIST DOCUMENTED: ICD-10-PCS | Mod: CPTII,S$GLB,, | Performed by: INTERNAL MEDICINE

## 2021-09-21 PROCEDURE — 93005 ELECTROCARDIOGRAM TRACING: CPT | Mod: S$GLB,,, | Performed by: INTERNAL MEDICINE

## 2021-09-21 PROCEDURE — 4010F ACE/ARB THERAPY RXD/TAKEN: CPT | Mod: CPTII,S$GLB,, | Performed by: INTERNAL MEDICINE

## 2021-09-21 PROCEDURE — 3008F BODY MASS INDEX DOCD: CPT | Mod: CPTII,S$GLB,, | Performed by: INTERNAL MEDICINE

## 2021-09-21 PROCEDURE — 1159F MED LIST DOCD IN RCRD: CPT | Mod: CPTII,S$GLB,, | Performed by: INTERNAL MEDICINE

## 2021-09-21 PROCEDURE — 4010F PR ACE/ARB THEARPY RXD/TAKEN: ICD-10-PCS | Mod: CPTII,S$GLB,, | Performed by: INTERNAL MEDICINE

## 2021-09-21 PROCEDURE — 93005 RHYTHM STRIP: ICD-10-PCS | Mod: S$GLB,,, | Performed by: INTERNAL MEDICINE

## 2021-09-21 PROCEDURE — 3044F PR MOST RECENT HEMOGLOBIN A1C LEVEL <7.0%: ICD-10-PCS | Mod: CPTII,S$GLB,, | Performed by: INTERNAL MEDICINE

## 2021-09-21 PROCEDURE — 3008F PR BODY MASS INDEX (BMI) DOCUMENTED: ICD-10-PCS | Mod: CPTII,S$GLB,, | Performed by: INTERNAL MEDICINE

## 2021-09-21 PROCEDURE — 99214 PR OFFICE/OUTPT VISIT, EST, LEVL IV, 30-39 MIN: ICD-10-PCS | Mod: S$GLB,,, | Performed by: INTERNAL MEDICINE

## 2021-09-21 PROCEDURE — 99214 OFFICE O/P EST MOD 30 MIN: CPT | Mod: S$GLB,,, | Performed by: INTERNAL MEDICINE

## 2021-09-21 PROCEDURE — 99999 PR PBB SHADOW E&M-EST. PATIENT-LVL III: ICD-10-PCS | Mod: PBBFAC,,, | Performed by: INTERNAL MEDICINE

## 2021-09-21 PROCEDURE — 1160F RVW MEDS BY RX/DR IN RCRD: CPT | Mod: CPTII,S$GLB,, | Performed by: INTERNAL MEDICINE

## 2021-09-22 ENCOUNTER — PATIENT MESSAGE (OUTPATIENT)
Dept: ELECTROPHYSIOLOGY | Facility: CLINIC | Age: 56
End: 2021-09-22

## 2021-09-22 ENCOUNTER — RESEARCH ENCOUNTER (OUTPATIENT)
Dept: RESEARCH | Facility: HOSPITAL | Age: 56
End: 2021-09-22

## 2021-09-23 ENCOUNTER — PATIENT MESSAGE (OUTPATIENT)
Dept: SLEEP MEDICINE | Facility: CLINIC | Age: 56
End: 2021-09-23

## 2021-10-05 ENCOUNTER — PATIENT MESSAGE (OUTPATIENT)
Dept: ELECTROPHYSIOLOGY | Facility: CLINIC | Age: 56
End: 2021-10-05

## 2021-10-05 ENCOUNTER — PATIENT MESSAGE (OUTPATIENT)
Dept: SLEEP MEDICINE | Facility: CLINIC | Age: 56
End: 2021-10-05

## 2021-10-19 ENCOUNTER — PATIENT MESSAGE (OUTPATIENT)
Dept: ELECTROPHYSIOLOGY | Facility: CLINIC | Age: 56
End: 2021-10-19
Payer: COMMERCIAL

## 2021-10-19 DIAGNOSIS — I48.0 PAF (PAROXYSMAL ATRIAL FIBRILLATION): Primary | ICD-10-CM

## 2021-10-20 RX ORDER — PROPAFENONE HYDROCHLORIDE 225 MG/1
225 TABLET, FILM COATED ORAL EVERY 8 HOURS
Qty: 90 TABLET | Refills: 11 | Status: SHIPPED | OUTPATIENT
Start: 2021-10-20 | End: 2022-10-10

## 2021-11-09 ENCOUNTER — PATIENT MESSAGE (OUTPATIENT)
Dept: ELECTROPHYSIOLOGY | Facility: CLINIC | Age: 56
End: 2021-11-09
Payer: COMMERCIAL

## 2021-11-24 ENCOUNTER — PATIENT MESSAGE (OUTPATIENT)
Dept: ELECTROPHYSIOLOGY | Facility: CLINIC | Age: 56
End: 2021-11-24
Payer: COMMERCIAL

## 2021-12-06 ENCOUNTER — PATIENT MESSAGE (OUTPATIENT)
Dept: ELECTROPHYSIOLOGY | Facility: CLINIC | Age: 56
End: 2021-12-06
Payer: COMMERCIAL

## 2021-12-08 ENCOUNTER — PATIENT MESSAGE (OUTPATIENT)
Dept: ELECTROPHYSIOLOGY | Facility: CLINIC | Age: 56
End: 2021-12-08
Payer: COMMERCIAL

## 2021-12-09 ENCOUNTER — PATIENT MESSAGE (OUTPATIENT)
Dept: PODIATRY | Facility: CLINIC | Age: 56
End: 2021-12-09

## 2021-12-09 ENCOUNTER — OFFICE VISIT (OUTPATIENT)
Dept: PODIATRY | Facility: CLINIC | Age: 56
End: 2021-12-09
Payer: COMMERCIAL

## 2021-12-09 VITALS
HEART RATE: 74 BPM | BODY MASS INDEX: 47.09 KG/M2 | SYSTOLIC BLOOD PRESSURE: 111 MMHG | HEIGHT: 66 IN | DIASTOLIC BLOOD PRESSURE: 79 MMHG | RESPIRATION RATE: 20 BRPM | WEIGHT: 293 LBS

## 2021-12-09 DIAGNOSIS — M10.9 ACUTE GOUT OF FOOT, UNSPECIFIED CAUSE, UNSPECIFIED LATERALITY: Primary | ICD-10-CM

## 2021-12-09 PROCEDURE — 3008F PR BODY MASS INDEX (BMI) DOCUMENTED: ICD-10-PCS | Mod: CPTII,S$GLB,, | Performed by: PODIATRIST

## 2021-12-09 PROCEDURE — 3008F BODY MASS INDEX DOCD: CPT | Mod: CPTII,S$GLB,, | Performed by: PODIATRIST

## 2021-12-09 PROCEDURE — 3078F PR MOST RECENT DIASTOLIC BLOOD PRESSURE < 80 MM HG: ICD-10-PCS | Mod: CPTII,S$GLB,, | Performed by: PODIATRIST

## 2021-12-09 PROCEDURE — 3074F PR MOST RECENT SYSTOLIC BLOOD PRESSURE < 130 MM HG: ICD-10-PCS | Mod: CPTII,S$GLB,, | Performed by: PODIATRIST

## 2021-12-09 PROCEDURE — 99999 PR PBB SHADOW E&M-EST. PATIENT-LVL IV: CPT | Mod: PBBFAC,,, | Performed by: PODIATRIST

## 2021-12-09 PROCEDURE — 99999 PR PBB SHADOW E&M-EST. PATIENT-LVL IV: ICD-10-PCS | Mod: PBBFAC,,, | Performed by: PODIATRIST

## 2021-12-09 PROCEDURE — 99203 PR OFFICE/OUTPT VISIT, NEW, LEVL III, 30-44 MIN: ICD-10-PCS | Mod: S$GLB,,, | Performed by: PODIATRIST

## 2021-12-09 PROCEDURE — 3074F SYST BP LT 130 MM HG: CPT | Mod: CPTII,S$GLB,, | Performed by: PODIATRIST

## 2021-12-09 PROCEDURE — 3078F DIAST BP <80 MM HG: CPT | Mod: CPTII,S$GLB,, | Performed by: PODIATRIST

## 2021-12-09 PROCEDURE — 99203 OFFICE O/P NEW LOW 30 MIN: CPT | Mod: S$GLB,,, | Performed by: PODIATRIST

## 2021-12-09 RX ORDER — PREDNISONE 10 MG/1
TABLET ORAL DAILY
Qty: 10 TABLET | Refills: 0 | Status: SHIPPED | OUTPATIENT
Start: 2021-12-09 | End: 2022-02-09 | Stop reason: SDUPTHER

## 2021-12-13 ENCOUNTER — PATIENT MESSAGE (OUTPATIENT)
Dept: ELECTROPHYSIOLOGY | Facility: CLINIC | Age: 56
End: 2021-12-13
Payer: COMMERCIAL

## 2021-12-14 ENCOUNTER — OFFICE VISIT (OUTPATIENT)
Dept: ELECTROPHYSIOLOGY | Facility: CLINIC | Age: 56
End: 2021-12-14
Payer: COMMERCIAL

## 2021-12-14 ENCOUNTER — HOSPITAL ENCOUNTER (OUTPATIENT)
Dept: CARDIOLOGY | Facility: CLINIC | Age: 56
Discharge: HOME | End: 2021-12-14
Payer: COMMERCIAL

## 2021-12-14 VITALS
DIASTOLIC BLOOD PRESSURE: 78 MMHG | SYSTOLIC BLOOD PRESSURE: 126 MMHG | HEART RATE: 85 BPM | HEIGHT: 66 IN | BODY MASS INDEX: 47.09 KG/M2 | WEIGHT: 293 LBS

## 2021-12-14 DIAGNOSIS — E11.42 TYPE 2 DIABETES MELLITUS WITH DIABETIC POLYNEUROPATHY, WITHOUT LONG-TERM CURRENT USE OF INSULIN: Chronic | ICD-10-CM

## 2021-12-14 DIAGNOSIS — I48.0 PAF (PAROXYSMAL ATRIAL FIBRILLATION): Primary | ICD-10-CM

## 2021-12-14 DIAGNOSIS — I48.91 ATRIAL FIBRILLATION, UNSPECIFIED TYPE: ICD-10-CM

## 2021-12-14 DIAGNOSIS — I10 ESSENTIAL HYPERTENSION: Chronic | ICD-10-CM

## 2021-12-14 PROCEDURE — 99215 OFFICE O/P EST HI 40 MIN: CPT | Mod: S$GLB,,, | Performed by: INTERNAL MEDICINE

## 2021-12-14 PROCEDURE — 99215 PR OFFICE/OUTPT VISIT, EST, LEVL V, 40-54 MIN: ICD-10-PCS | Mod: S$GLB,,, | Performed by: INTERNAL MEDICINE

## 2021-12-14 PROCEDURE — 93010 RHYTHM STRIP: ICD-10-PCS | Mod: S$GLB,,, | Performed by: INTERNAL MEDICINE

## 2021-12-14 PROCEDURE — 93010 ELECTROCARDIOGRAM REPORT: CPT | Mod: S$GLB,,, | Performed by: INTERNAL MEDICINE

## 2021-12-14 PROCEDURE — 93005 ELECTROCARDIOGRAM TRACING: CPT | Mod: S$GLB,,, | Performed by: INTERNAL MEDICINE

## 2021-12-14 PROCEDURE — 93005 RHYTHM STRIP: ICD-10-PCS | Mod: S$GLB,,, | Performed by: INTERNAL MEDICINE

## 2021-12-14 PROCEDURE — 4010F ACE/ARB THERAPY RXD/TAKEN: CPT | Mod: CPTII,S$GLB,, | Performed by: INTERNAL MEDICINE

## 2021-12-14 PROCEDURE — 4010F PR ACE/ARB THEARPY RXD/TAKEN: ICD-10-PCS | Mod: CPTII,S$GLB,, | Performed by: INTERNAL MEDICINE

## 2021-12-14 PROCEDURE — 99999 PR PBB SHADOW E&M-EST. PATIENT-LVL IV: ICD-10-PCS | Mod: PBBFAC,,, | Performed by: INTERNAL MEDICINE

## 2021-12-14 PROCEDURE — 99999 PR PBB SHADOW E&M-EST. PATIENT-LVL IV: CPT | Mod: PBBFAC,,, | Performed by: INTERNAL MEDICINE

## 2021-12-16 DIAGNOSIS — I48.0 PAF (PAROXYSMAL ATRIAL FIBRILLATION): Primary | ICD-10-CM

## 2021-12-16 DIAGNOSIS — I48.92 ATRIAL FLUTTER, UNSPECIFIED TYPE: ICD-10-CM

## 2021-12-29 ENCOUNTER — PATIENT MESSAGE (OUTPATIENT)
Dept: SLEEP MEDICINE | Facility: CLINIC | Age: 56
End: 2021-12-29
Payer: COMMERCIAL

## 2022-01-08 ENCOUNTER — PATIENT MESSAGE (OUTPATIENT)
Dept: PODIATRY | Facility: CLINIC | Age: 57
End: 2022-01-08
Payer: COMMERCIAL

## 2022-01-09 ENCOUNTER — PATIENT MESSAGE (OUTPATIENT)
Dept: PODIATRY | Facility: CLINIC | Age: 57
End: 2022-01-09
Payer: COMMERCIAL

## 2022-01-09 DIAGNOSIS — M10.9 ACUTE GOUT OF FOOT, UNSPECIFIED CAUSE, UNSPECIFIED LATERALITY: Primary | ICD-10-CM

## 2022-01-10 ENCOUNTER — HOSPITAL ENCOUNTER (OUTPATIENT)
Dept: RADIOLOGY | Facility: HOSPITAL | Age: 57
Discharge: HOME | End: 2022-01-10
Attending: PODIATRIST
Payer: COMMERCIAL

## 2022-01-10 ENCOUNTER — OFFICE VISIT (OUTPATIENT)
Dept: PODIATRY | Facility: CLINIC | Age: 57
End: 2022-01-10
Payer: COMMERCIAL

## 2022-01-10 VITALS
SYSTOLIC BLOOD PRESSURE: 117 MMHG | WEIGHT: 293 LBS | HEART RATE: 71 BPM | BODY MASS INDEX: 47.09 KG/M2 | HEIGHT: 66 IN | DIASTOLIC BLOOD PRESSURE: 78 MMHG

## 2022-01-10 DIAGNOSIS — M79.672 LEFT FOOT PAIN: Primary | ICD-10-CM

## 2022-01-10 DIAGNOSIS — M10.9 ACUTE GOUT OF FOOT, UNSPECIFIED CAUSE, UNSPECIFIED LATERALITY: ICD-10-CM

## 2022-01-10 PROCEDURE — 3078F DIAST BP <80 MM HG: CPT | Mod: CPTII,S$GLB,, | Performed by: PODIATRIST

## 2022-01-10 PROCEDURE — 99214 PR OFFICE/OUTPT VISIT, EST, LEVL IV, 30-39 MIN: ICD-10-PCS | Mod: S$GLB,,, | Performed by: PODIATRIST

## 2022-01-10 PROCEDURE — 3078F PR MOST RECENT DIASTOLIC BLOOD PRESSURE < 80 MM HG: ICD-10-PCS | Mod: CPTII,S$GLB,, | Performed by: PODIATRIST

## 2022-01-10 PROCEDURE — 73630 XR FOOT COMPLETE 3 VIEW BILATERAL: ICD-10-PCS | Mod: 26,50,, | Performed by: RADIOLOGY

## 2022-01-10 PROCEDURE — 3008F BODY MASS INDEX DOCD: CPT | Mod: CPTII,S$GLB,, | Performed by: PODIATRIST

## 2022-01-10 PROCEDURE — 3074F SYST BP LT 130 MM HG: CPT | Mod: CPTII,S$GLB,, | Performed by: PODIATRIST

## 2022-01-10 PROCEDURE — 99214 OFFICE O/P EST MOD 30 MIN: CPT | Mod: S$GLB,,, | Performed by: PODIATRIST

## 2022-01-10 PROCEDURE — 99999 PR PBB SHADOW E&M-EST. PATIENT-LVL IV: ICD-10-PCS | Mod: PBBFAC,,, | Performed by: PODIATRIST

## 2022-01-10 PROCEDURE — 3008F PR BODY MASS INDEX (BMI) DOCUMENTED: ICD-10-PCS | Mod: CPTII,S$GLB,, | Performed by: PODIATRIST

## 2022-01-10 PROCEDURE — 3074F PR MOST RECENT SYSTOLIC BLOOD PRESSURE < 130 MM HG: ICD-10-PCS | Mod: CPTII,S$GLB,, | Performed by: PODIATRIST

## 2022-01-10 PROCEDURE — 99999 PR PBB SHADOW E&M-EST. PATIENT-LVL IV: CPT | Mod: PBBFAC,,, | Performed by: PODIATRIST

## 2022-01-10 PROCEDURE — 73630 X-RAY EXAM OF FOOT: CPT | Mod: 26,50,, | Performed by: RADIOLOGY

## 2022-01-10 PROCEDURE — 73630 X-RAY EXAM OF FOOT: CPT | Mod: TC,50

## 2022-01-10 NOTE — PROGRESS NOTES
Subjective:      Patient ID: Vicky Alvarado is a 56 y.o. female.    Chief Complaint: Foot Pain (Redness ) and Foot Swelling (Bilateral foot issues )    Vicky is a 56 y.o. female who presents to the podiatry clinic  with complaint of  right foot pain. Onset of the symptoms was several days ago. Precipitating event: none known. Current symptoms include: ability to bear weight, but with some pain. Aggravating factors: any weight bearing. Symptoms have gradually worsened. Patient has had prior foot problems. Evaluation to date: none. Treatment to date: none. Patients rates pain 7/10 on pain scale.        Review of Systems   Constitutional: Negative for chills, fever and malaise/fatigue.   HENT: Negative for hearing loss.    Cardiovascular: Negative for claudication.   Respiratory: Negative for shortness of breath.    Skin: Negative for flushing and rash.   Musculoskeletal: Positive for gout, joint pain and joint swelling. Negative for myalgias.   Neurological: Negative for loss of balance, numbness, paresthesias and sensory change.   Psychiatric/Behavioral: Negative for altered mental status.           Objective:      Physical Exam  Vitals reviewed.   Cardiovascular:      Pulses:           Dorsalis pedis pulses are 2+ on the right side and 2+ on the left side.        Posterior tibial pulses are 2+ on the right side and 2+ on the left side.      Comments: No edema noted b/L  Musculoskeletal:      Comments:   POP to right 1st MPJ      Feet:      Right foot:      Protective Sensation: 5 sites tested. 5 sites sensed.      Left foot:      Protective Sensation: 5 sites tested. 5 sites sensed.   Skin:     General: Skin is warm.      Capillary Refill: Capillary refill takes 2 to 3 seconds.      Comments: Increase in temp noted to right 1st MPJ with pain    Neurological:      Mental Status: She is alert.      Deep Tendon Reflexes: Strength normal.      Comments: Gross sensation intact b/L   Psychiatric:         Mood and  Affect: Mood and affect normal.               Assessment:       Encounter Diagnosis   Name Primary?    Acute gout of foot, unspecified cause, unspecified laterality Yes         Plan:       Vicky was seen today for foot pain and foot swelling.    Diagnoses and all orders for this visit:    Acute gout of foot, unspecified cause, unspecified laterality  -     predniSONE (DELTASONE) 10 MG tablet; take 1 tablet by mouth daily      I counseled the patient on her conditions, their implications and medical management.      Pt advised that pain is likely due to gout  rx prednisone  Pt advised to monitor what precipitates gout attacks.      .

## 2022-01-11 ENCOUNTER — PATIENT MESSAGE (OUTPATIENT)
Dept: ELECTROPHYSIOLOGY | Facility: CLINIC | Age: 57
End: 2022-01-11
Payer: COMMERCIAL

## 2022-01-18 ENCOUNTER — LAB VISIT (OUTPATIENT)
Dept: LAB | Facility: HOSPITAL | Age: 57
End: 2022-01-18
Attending: INTERNAL MEDICINE
Payer: COMMERCIAL

## 2022-01-18 DIAGNOSIS — I48.92 ATRIAL FLUTTER, UNSPECIFIED TYPE: ICD-10-CM

## 2022-01-18 DIAGNOSIS — I48.0 PAF (PAROXYSMAL ATRIAL FIBRILLATION): ICD-10-CM

## 2022-01-18 LAB
ANION GAP SERPL CALC-SCNC: 13 MMOL/L (ref 8–16)
APTT BLDCRRT: 29.8 SEC (ref 21–32)
BUN SERPL-MCNC: 9 MG/DL (ref 6–20)
CALCIUM SERPL-MCNC: 9.9 MG/DL (ref 8.7–10.5)
CHLORIDE SERPL-SCNC: 95 MMOL/L (ref 95–110)
CO2 SERPL-SCNC: 30 MMOL/L (ref 23–29)
CREAT SERPL-MCNC: 1.1 MG/DL (ref 0.5–1.4)
ERYTHROCYTE [DISTWIDTH] IN BLOOD BY AUTOMATED COUNT: 16.3 % (ref 11.5–14.5)
EST. GFR  (AFRICAN AMERICAN): >60 ML/MIN/1.73 M^2
EST. GFR  (NON AFRICAN AMERICAN): 55.9 ML/MIN/1.73 M^2
GLUCOSE SERPL-MCNC: 104 MG/DL (ref 70–110)
HCT VFR BLD AUTO: 39.1 % (ref 37–48.5)
HGB BLD-MCNC: 12.4 G/DL (ref 12–16)
INR PPP: 1.1 (ref 0.8–1.2)
MCH RBC QN AUTO: 32.5 PG (ref 27–31)
MCHC RBC AUTO-ENTMCNC: 31.7 G/DL (ref 32–36)
MCV RBC AUTO: 102 FL (ref 82–98)
PLATELET # BLD AUTO: 182 K/UL (ref 150–450)
PMV BLD AUTO: 11.7 FL (ref 9.2–12.9)
POTASSIUM SERPL-SCNC: 4.8 MMOL/L (ref 3.5–5.1)
PROTHROMBIN TIME: 11.9 SEC (ref 9–12.5)
RBC # BLD AUTO: 3.82 M/UL (ref 4–5.4)
SODIUM SERPL-SCNC: 138 MMOL/L (ref 136–145)
WBC # BLD AUTO: 9.95 K/UL (ref 3.9–12.7)

## 2022-01-18 PROCEDURE — 85610 PROTHROMBIN TIME: CPT | Performed by: INTERNAL MEDICINE

## 2022-01-18 PROCEDURE — 85730 THROMBOPLASTIN TIME PARTIAL: CPT | Performed by: INTERNAL MEDICINE

## 2022-01-18 PROCEDURE — 80048 BASIC METABOLIC PNL TOTAL CA: CPT | Performed by: INTERNAL MEDICINE

## 2022-01-18 PROCEDURE — 36415 COLL VENOUS BLD VENIPUNCTURE: CPT | Performed by: INTERNAL MEDICINE

## 2022-01-18 PROCEDURE — 85027 COMPLETE CBC AUTOMATED: CPT | Performed by: INTERNAL MEDICINE

## 2022-01-21 ENCOUNTER — TELEPHONE (OUTPATIENT)
Dept: ELECTROPHYSIOLOGY | Facility: CLINIC | Age: 57
End: 2022-01-21
Payer: COMMERCIAL

## 2022-01-21 NOTE — TELEPHONE ENCOUNTER
Spoke to Ms. Perry    CONFIRMED procedure arrival time of 5:15am    Reiterated instructions including:  -Directions to check in desk  -NPO after midnight night prior to procedure  -High importance of HOLDING Rythmol after 1/20, Eliquis am of procedure   -Pre-procedure LABS done, no alerts  -COVID test on admit  -Confirmed no fever, cough, or shortness of breath in the past 30 days  -Confirmed no redness, rash, irritation, or yeast infection to groin area.     Patient verbalizes understanding of above and appreciates call.

## 2022-01-21 NOTE — HPI
Afib/ PVI   Cancel if NSR.      Dr. Hawley      56 yoF with obesity, ERENDIRA, CPAP, Afib on AC, HTN,      7/7/2020: 1/9/20, she had her upper teeth pulled for dentures and been on penicillin since that time. She was in SR. She takes atenolol 10 mg q.d. for hypertension. She underwent REENA/CV 1/29/2020. EF 40% in AF. PET Stress 3/2020 revealed no ischemia. Repeat echo in NSR showed EF 60%. She has had recurrence, once during carpal tunnel surgery, and several other times since.   10/2020: PVI 7/20/2020. 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 She underwent emergent exploratory surgery by Dr Rose who ligated a branch of her left SFA. 3/21: AF/RVR recurrence with ER visit. Flecainide 100 mg bid started 3/1/21.    Later rythmol started for pAF. She feels that her AF has returned recently with some long sustained events. In SR today. She feels that the AF is compromising her QOL and saw Dr. Hawley for ablation.       TTE 6/30/2021: The left ventricle is normal in size with concentric remodeling and normal systolic function. The estimated ejection fraction is 60%. Normal left ventricular diastolic function. Normal right ventricular size with normal right ventricular systolic function. Normal central venous pressure (3 mmHg). Aortic valve area is 1.63 cm2; peak velocity is 2.63 m/s; mean gradient is 17 mmHg.  There is mild aortic valve stenosis. The estimated ejection fraction is 60%    1/29/2020: REENA: REENA performed to assess the EMILIANO prior to electrical cardioversion. Normal appearing left atrial appendage. No thrombus is present in the appendage which was confirmed with contrast enhancement. Abnormal appendage velocities 0.3 m/s.Mildly decreased left ventricular systolic function. The estimated ejection fraction is 40%.  Normal right ventricular systolic function. Mild-to-moderate mitral regurgitation. Mild mitral sclerosis. Mild tricuspid regurgitation    1/28/2020: TTE:  Left ventricular  "systolic function. The estimated ejection fraction is 40%. Local segmental wall motion abnormalities. Moderate left atrial enlargement.  Normal right ventricular systolic function. Moderate right atrial enlargement. Possibly mild aortic valve stenosis ( reduced LENCHO but valve opening appears near normal).  Aortic valve area is 1.67 cm2; peak velocity is 1.77 m/s; mean gradient is 8 mmHg.  Mild mitral regurgitation. Mild tricuspid regurgitation. Normal central venous pressure (3 mmHg). The estimated PA systolic pressure is 29 mmHg.      Dysphagia or odynophagia:  {Blank single:19197::"Yes","No"}  Liver Disease, esophageal disease, or known varices:  {Blank single:19197::"Yes","No"}  Upper GI Bleeding: {Blank single:19197::"Yes","No"}  Snoring:  {Blank single:19197::"Yes","No"}  Sleep Apnea:  {Blank single:19197::"Yes","No"}  Prior neck surgery or radiation:  {Blank single:19197::"Yes","No"}  History of anesthetic difficulties:  {Blank single:19197::"Yes","No"}  Family history of anesthetic difficulties:  {Blank single:19197::"Yes","No"}  Last oral intake:  {Blank single:19197::"12 hours ago","24 hours ago"}  Able to move neck in all directions:  {Blank single:19197::"Yes","No"}   "

## 2022-01-21 NOTE — ASSESSMENT & PLAN NOTE
56 yr old  With prior PVI for AF came today for REENA/PVI redo.    1. REENA for evaluation of EMILIANO thrombus evaluation prior to PVI.  -No absolute contraindications of esophageal stricture, tumor, perforation, laceration,or diverticulum and/or active GI bleed  -The risks, benefits & alternatives of the procedure were explained to the patient.   -The risks of transesophageal echo include but are not limited to:  Dental trauma, esophageal trauma/perforation, bleeding, laryngospasm/brochospasm, aspiration, sore throat/hoarseness, & dislodgement of the endotracheal tube/nasogastric tube (where applicable).    -The risks of moderate sedation include hypotension, respiratory depression, arrhythmias, bronchospasm, & death.    -Informed consent was obtained. The patient is agreeable to proceed with the procedure and all questions and concerns addressed.    Case discussed with an attending in echocardiography lab.     Further recommendations per attending addendum

## 2022-01-23 NOTE — HPI
Vicky Alvarado is a 56 y.o. female who presents for RFA for refractory Atrial Fibrillation despite use of AAD.    RF re-do with CTI and PWI. Prior RFA PVI done 7/21/20 which was complicated by SFA laceration resulting in a  large left SFA hematoma. Vascular surgery consulted and on 7/21/20 patient underwent left SFA exploration with direct repair of L SFA branch laceration    Relevant history:   7/7/2020: 1/9/20, she had her upper teeth pulled for dentures and been on penicillin since that time. She was in SR. She takes atenolol 10 mg q.d. for hypertension. She underwent REENA/CV 1/29/2020. EF 40% in AF. PET Stress 3/2020 revealed no ischemia. Repeat echo in NSR showed EF 60%. She has had recurrence, once during carpal tunnel surgery, and several other times since.  Regarding her family history, her mother and brother both have atrial fibrillation. She is on elquis for CVA prophylaxis. CHADSVASC of at least 3. No bleeding issues with xarelto.      10/2020: PVI 7/20/2020. 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 She underwent emergent exploratory surgery by Dr Rose who ligated a branch of her left SFA. She had further wound issues requiring wash out and wound vac over the next few months. This has been a challenging recovery but she is nearing completion. No symptomatic or documented AF recurrence.      3/21: AF/RVR recurrence with ER visit. Flecainide 100 mg bid started 3/1/21. No recurrence since. Some PVCs     6/21: Her leg incision has healed. She has resumed work. Over the past month, she has developed more NAPIER.      9/21/21: HTN meds adjusted. Lasix started, hctz stopped, nifedipine started. Labs showed anemia. Endoscopy with no lesions. Respiratory evaluation performed without obvious culprit. LE edema worsened. She stopped flecainide with resolution of symptoms.  She has had some palpitations.      Interval history: rythmol started for pAF. She feels that her AF has  returned recently with some long sustained events. In SR today. She feels that the AF is compromising her QOL and asks about repeat ablation.      holter 7/21 10% PVCs     Echo 6/30/21:  ·           The left ventricle is normal in size with concentric remodeling and normal systolic function. The estimated ejection fraction is 60%.  ·           Normal left ventricular diastolic function.  ·           Normal right ventricular size with normal right ventricular systolic function.  ·           Normal central venous pressure (3 mmHg).  ·           Aortic valve area is 1.63 cm2; peak velocity is 2.63 m/s; mean gradient is 17 mmHg.  ·           There is mild aortic valve stenosis.  ·           The estimated ejection fraction is 60%.     TTE 5/2020:  ·           Normal left ventricular systolic function. The estimated ejection fraction is 60%.  ·           Severe left atrial enlargement.  ·           Grade I (mild) left ventricular diastolic dysfunction consistent with impaired relaxation.  ·           Normal right ventricular systolic function.  ·           Mild right atrial enlargement.  ·           Mild aortic valve stenosis. Aortic valve area is 1.83 cm2; peak velocity is 2.42 m/s; mean gradient is 12 mmHg.  ·           Mild tricuspid regurgitation.  ·           Normal central venous pressure (3 mmHg).  ·           The estimated PA systolic pressure is 22 mmHg.     PET Stress 3/2020:  The relative PET images are normal showing no clinically significant regional resting or stress induced perfusion defects.    Whole heart absolute myocardial perfusion (cc/min/g) averaged 0.47 cc/min/g at rest (which is reduced), 0.93 cc/min/g at stress (which is moderately reduced), and 2.03 CFR (which is mildly reduced).    Gated perfusion images showed an ejection fraction of 78% at rest and 72% during stress. Normal ejection fraction is greater than 51%.    Wall motion was normal at rest and during stress.    LV cavity size is normal  at rest and stress.    The EKG portion of this study is negative for ischemia.    There were no arrhythmias during stress.    The patient reported no chest pain during the stress test.    There are no prior studies for comparison.

## 2022-01-23 NOTE — SUBJECTIVE & OBJECTIVE
Past Medical History:   Diagnosis Date    Anticoagulant long-term use     Atrial fibrillation     cardioversion    Atrial fibrillation with RVR     Avascular necrosis     L hand    Depression     Encounter for blood transfusion 07/22/2020    GERD (gastroesophageal reflux disease)     Hx of psychiatric care     Hypertension     Obese     Psychiatric problem     Sleep apnea        Past Surgical History:   Procedure Laterality Date    ABLATION OF ARRHYTHMOGENIC FOCUS FOR ATRIAL FIBRILLATION N/A 7/20/2020    Procedure: Ablation atrial fibrillation;  Surgeon: Gabriel Hawley MD;  Location: [facility] EP LAB;  Service: Cardiology;  Laterality: N/A;  afib, PVI, RFA, REENA, SHADY, anes, MB, 3 Prep    APPLICATION OF WOUND VACUUM-ASSISTED CLOSURE DEVICE Left 8/3/2020    Procedure: APPLICATION, WOUND VAC;  Surgeon: SEMAJ Márquez III, MD;  Location: [facility] OR 2ND FLR;  Service: Peripheral Vascular;  Laterality: Left;    APPLICATION OF WOUND VACUUM-ASSISTED CLOSURE DEVICE Left 8/6/2020    Procedure: APPLICATION, WOUND VAC;  Surgeon: SEMAJ Márquez III, MD;  Location: [facility] OR [facility] FLR;  Service: Peripheral Vascular;  Laterality: Left;    CARPAL TUNNEL RELEASE Right 6/10/2020    Procedure: RELEASE, CARPAL TUNNEL - RIGHT;  Surgeon: Adelaida Hall MD;  Location: [facility];  Service: Orthopedics;  Laterality: Right;  GENERAL AND REGIONAL    CARPAL TUNNEL RELEASE Left 5/5/2021    Procedure: RELEASE, CARPAL TUNNEL, LEFT;  Surgeon: Adelaida Hall MD;  Location: [facility];  Service: Orthopedics;  Laterality: Left;  GENERAL & REGIONAL IN PACU    CLOSURE OF WOUND Left 8/6/2020    Procedure: CLOSURE, WOUND;  Surgeon: SEMAJ Márquez III, MD;  Location: [facility] OR 2ND FLR;  Service: Peripheral Vascular;  Laterality: Left;  Complex    COLONOSCOPY N/A 8/17/2021    Procedure: COLONOSCOPY Suprep;  Surgeon: Loco Deluca MD;  Location: [facility];  Service: Endoscopy;  Laterality: N/A;    ESOPHAGOGASTRODUODENOSCOPY  N/A 8/17/2021    Procedure: EGD (ESOPHAGOGASTRODUODENOSCOPY);  Surgeon: Loco Deluca MD;  Location: [facility];  Service: Endoscopy;  Laterality: N/A;  Patient is schedule [facility] [place].    EXPLORATION OF FEMORAL ARTERY Left 7/21/2020    Procedure: EXPLORATION, ARTERY, FEMORAL;  Surgeon: SEMAJ Márquez III, MD;  Location: [facility] OR [address] [place] [address];  Service: Peripheral Vascular;  Laterality: Left;  1. Urgent Direct repair, L SFA branch laceration    FOOT SURGERY      TREATMENT OF CARDIAC ARRHYTHMIA N/A 1/29/2020    Procedure: CARDIOVERSION;  Surgeon: Gabriel Hawley MD;  Location: [facility] EP LAB;  Service: Cardiology;  Laterality: N/A;  af, demi, dccv, anes, mb, 345    WOUND DEBRIDEMENT Left 8/6/2020    Procedure: DEBRIDEMENT, WOUND;  Surgeon: SEMAJ Márquez III, MD;  Location: [address];  Service: Peripheral Vascular;  Laterality: Left;       Review of patient's allergies indicates:   Allergen Reactions    Flecainide Shortness Of Breath and Swelling       No current facility-administered medications on file prior to encounter.     Current Outpatient Medications on File Prior to Encounter   Medication Sig    ALPRAZolam (XANAX) 0.5 MG tablet Take 1 tablet (0.5 mg total) by mouth 2 (two) times daily as needed for Anxiety.    atorvastatin (LIPITOR) 40 MG tablet Take 1 tablet (40 mg total) by mouth once daily.    b complex vitamins capsule Take 1 capsule by mouth once daily.    colchicine (COLCRYS) 0.6 mg tablet For gout attack: Take TWO tablets by mouth, then, 2 hours later, take ONE additional tablet. Then take 1 tablet twice daily only if still needed for gout pain. (Patient taking differently: For gout attack: Take TWO tablets by mouth, then, 2 hours later, take ONE additional tablet. Then take 1 tablet twice daily only if still needed for gout pain.)    DULoxetine (CYMBALTA) 60 MG capsule Take 2 capsules (120 mg total) by mouth once  daily.    ferrous sulfate (SLOW RELEASE IRON) 142 mg (45 mg iron) TbSR Take by mouth.    furosemide (LASIX) 40 MG tablet Take 1 tablet (40 mg total) by mouth 2 (two) times daily.    [facility]-N-ijtp-reymundo-bor 750 mg-644 mg- 30 mg-1 mg Tab Take 1 tablet by mouth once daily.     krill/om-3/dha/epa/phospho/ast (MEGARED OMEGA-3 KRILL OIL ORAL)     lisinopriL (PRINIVIL,ZESTRIL) 40 MG tablet Take 1 tablet (40 mg total) by mouth once daily.    magnesium oxide (MAG-OX) 400 mg (241.3 mg magnesium) tablet Take 400 mg by mouth once daily.    MELATONIN ORAL Take 1-2 tablets by mouth nightly as needed (Sleep).    methylPREDNISolone (MEDROL DOSEPACK) 4 mg tablet use as directed on pack    metoprolol succinate (TOPROL-XL) 50 MG 24 hr tablet Take 1 tablet (50 mg total) by mouth 2 (two) times daily.    multivitamin (THERAGRAN) per tablet Take 1 tablet by mouth once daily.    NIFEdipine (PROCARDIA-XL) 90 MG (OSM) 24 hr tablet Take 1 tablet (90 mg total) by mouth once daily.    omeprazole (PRILOSEC) 20 MG capsule TAKE 1 CAPSULE BY MOUTH ONCE DAILY    potassium chloride SA (K-DUR,KLOR-CON) 20 MEQ tablet Take 1 tablet (20 mEq total) by mouth once daily.    predniSONE (DELTASONE) 10 MG tablet take 1 tablet by mouth daily    propafenone (RYTHMOL) 225 MG Tab Take 1 tablet (225 mg total) by mouth every 8 (eight) hours.    traZODone (DESYREL) 100 MG tablet Take 1 tablet (100 mg total) by mouth nightly as needed for Insomnia.     Family History     Problem Relation (Age of Onset)    Cataracts Mother    Diabetes Father    Hypertension Mother, Father, Sister, Brother    Thyroid disease Mother        Tobacco Use    Smoking status: Former Smoker     Types: Cigarettes     Quit date: 2019     Years since quittin.5    Smokeless tobacco: Never Used   Substance and Sexual Activity    Alcohol use: No    Drug use: No    Sexual activity: Not on file     Review of Systems   All other systems reviewed and are  negative.    Objective:     Vital Signs (Most Recent):    Vital Signs (24h Range):  BP: ()/()   Arterial Line BP: ()/()           There is no height or weight on file to calculate BMI.            Physical Exam  Vitals and nursing note reviewed.   Constitutional:       Appearance: Normal appearance.   HENT:      Head: Normocephalic and atraumatic.   Cardiovascular:      Rate and Rhythm: Normal rate. Rhythm irregular.      Pulses: Normal pulses.      Heart sounds: Normal heart sounds.   Pulmonary:      Effort: Pulmonary effort is normal.      Breath sounds: Normal breath sounds.   Musculoskeletal:      Right lower leg: No edema.      Left lower leg: No edema.   Skin:     General: Skin is warm.   Neurological:      General: No focal deficit present.      Mental Status: She is alert and oriented to person, place, and time.         Significant Labs: All pertinent lab results from the last 24 hours have been reviewed.

## 2022-01-23 NOTE — ASSESSMENT & PLAN NOTE
Plan to perform RFV access x 3 with 1 RIJ access. We had an extensive discussion with patient regarding risks and benefits of PVI/WACA, and the patient would like to proceed. Risks of procedure include (but are not limited to) bleeding, stroke, perforation requiring emergency draining or surgery, AV block, death, AV fistula, AE fistula, PV stenosis. He understood and desires to proceed. Consents signed.

## 2022-01-24 ENCOUNTER — HOSPITAL ENCOUNTER (OUTPATIENT)
Facility: HOSPITAL | Age: 57
Discharge: HOME | End: 2022-01-25
Attending: INTERNAL MEDICINE | Admitting: INTERNAL MEDICINE
Payer: COMMERCIAL

## 2022-01-24 ENCOUNTER — ANESTHESIA (OUTPATIENT)
Dept: MEDSURG UNIT | Facility: HOSPITAL | Age: 57
End: 2022-01-24
Payer: COMMERCIAL

## 2022-01-24 ENCOUNTER — ANESTHESIA EVENT (OUTPATIENT)
Dept: MEDSURG UNIT | Facility: HOSPITAL | Age: 57
End: 2022-01-24
Payer: COMMERCIAL

## 2022-01-24 DIAGNOSIS — I49.9 ARRHYTHMIA: ICD-10-CM

## 2022-01-24 DIAGNOSIS — I48.91 ATRIAL FIBRILLATION: ICD-10-CM

## 2022-01-24 DIAGNOSIS — I48.0 PAF (PAROXYSMAL ATRIAL FIBRILLATION): ICD-10-CM

## 2022-01-24 DIAGNOSIS — I48.0 PAF (PAROXYSMAL ATRIAL FIBRILLATION): Primary | ICD-10-CM

## 2022-01-24 DIAGNOSIS — I48.91 A-FIB: ICD-10-CM

## 2022-01-24 DIAGNOSIS — I48.92 ATRIAL FLUTTER, UNSPECIFIED TYPE: ICD-10-CM

## 2022-01-24 LAB
CTP QC/QA: YES
SARS-COV-2 AG RESP QL IA.RAPID: NEGATIVE

## 2022-01-24 PROCEDURE — C1894 INTRO/SHEATH, NON-LASER: HCPCS | Performed by: INTERNAL MEDICINE

## 2022-01-24 PROCEDURE — 93655 ICAR CATH ABLTJ DSCRT ARRHYT: CPT | Mod: ,,, | Performed by: INTERNAL MEDICINE

## 2022-01-24 PROCEDURE — D9220A PRA ANESTHESIA: ICD-10-PCS | Mod: ,,, | Performed by: ANESTHESIOLOGY

## 2022-01-24 PROCEDURE — 36620 INSERTION CATHETER ARTERY: CPT | Mod: 59,,, | Performed by: ANESTHESIOLOGY

## 2022-01-24 PROCEDURE — 93655 ICAR CATH ABLTJ DSCRT ARRHYT: CPT | Performed by: INTERNAL MEDICINE

## 2022-01-24 PROCEDURE — 93005 ELECTROCARDIOGRAM TRACING: CPT

## 2022-01-24 PROCEDURE — 63600175 PHARM REV CODE 636 W HCPCS: Performed by: ANESTHESIOLOGY

## 2022-01-24 PROCEDURE — 25000003 PHARM REV CODE 250: Performed by: STUDENT IN AN ORGANIZED HEALTH CARE EDUCATION/TRAINING PROGRAM

## 2022-01-24 PROCEDURE — 63600175 PHARM REV CODE 636 W HCPCS: Performed by: INTERNAL MEDICINE

## 2022-01-24 PROCEDURE — C2630 CATH, EP, COOL-TIP: HCPCS | Performed by: INTERNAL MEDICINE

## 2022-01-24 PROCEDURE — 37000008 HC ANESTHESIA 1ST 15 MINUTES: Performed by: INTERNAL MEDICINE

## 2022-01-24 PROCEDURE — C1730 CATH, EP, 19 OR FEW ELECT: HCPCS | Performed by: INTERNAL MEDICINE

## 2022-01-24 PROCEDURE — 93656 COMPRE EP EVAL ABLTJ ATR FIB: CPT | Performed by: INTERNAL MEDICINE

## 2022-01-24 PROCEDURE — 93010 ELECTROCARDIOGRAM REPORT: CPT | Mod: ,,, | Performed by: INTERNAL MEDICINE

## 2022-01-24 PROCEDURE — D9220A PRA ANESTHESIA: Mod: ,,, | Performed by: ANESTHESIOLOGY

## 2022-01-24 PROCEDURE — 25000003 PHARM REV CODE 250: Performed by: INTERNAL MEDICINE

## 2022-01-24 PROCEDURE — C1887 CATHETER, GUIDING: HCPCS | Performed by: INTERNAL MEDICINE

## 2022-01-24 PROCEDURE — 27201423 OPTIME MED/SURG SUP & DEVICES STERILE SUPPLY: Performed by: INTERNAL MEDICINE

## 2022-01-24 PROCEDURE — 25000003 PHARM REV CODE 250: Performed by: NURSE ANESTHETIST, CERTIFIED REGISTERED

## 2022-01-24 PROCEDURE — 37000009 HC ANESTHESIA EA ADD 15 MINS: Performed by: INTERNAL MEDICINE

## 2022-01-24 PROCEDURE — C1753 CATH, INTRAVAS ULTRASOUND: HCPCS | Performed by: INTERNAL MEDICINE

## 2022-01-24 PROCEDURE — 93656 COMPRE EP EVAL ABLTJ ATR FIB: CPT | Mod: ,,, | Performed by: INTERNAL MEDICINE

## 2022-01-24 PROCEDURE — 63600175 PHARM REV CODE 636 W HCPCS: Performed by: NURSE ANESTHETIST, CERTIFIED REGISTERED

## 2022-01-24 PROCEDURE — C1766 INTRO/SHEATH,STRBLE,NON-PEEL: HCPCS | Performed by: INTERNAL MEDICINE

## 2022-01-24 PROCEDURE — 93656 PR ELECTROPHYS EVAL, COMPREHEN, W/ABLATION OF ATRIAL FIBR: ICD-10-PCS | Mod: ,,, | Performed by: INTERNAL MEDICINE

## 2022-01-24 PROCEDURE — 93010 ELECTROCARDIOGRAM REPORT: CPT | Mod: 76,,, | Performed by: INTERNAL MEDICINE

## 2022-01-24 PROCEDURE — 36620 PR INSERT CATH,ART,PERCUT,SHORTTERM: ICD-10-PCS | Mod: 59,,, | Performed by: ANESTHESIOLOGY

## 2022-01-24 PROCEDURE — 93010 EKG 12-LEAD: ICD-10-PCS | Mod: ,,, | Performed by: INTERNAL MEDICINE

## 2022-01-24 PROCEDURE — 25000003 PHARM REV CODE 250: Performed by: ANESTHESIOLOGY

## 2022-01-24 PROCEDURE — 93655 PR ABLATE ARRHYTHMIA ADD ON: ICD-10-PCS | Mod: ,,, | Performed by: INTERNAL MEDICINE

## 2022-01-24 RX ORDER — HEPARIN SOD,PORCINE/0.9 % NACL 1000/500ML
INTRAVENOUS SOLUTION INTRAVENOUS
Status: DISCONTINUED | OUTPATIENT
Start: 2022-01-24 | End: 2022-01-25 | Stop reason: HOSPADM

## 2022-01-24 RX ORDER — TRAZODONE HYDROCHLORIDE 50 MG/1
100 TABLET ORAL NIGHTLY PRN
Status: DISCONTINUED | OUTPATIENT
Start: 2022-01-24 | End: 2022-01-25 | Stop reason: HOSPADM

## 2022-01-24 RX ORDER — HEPARIN SODIUM 1000 [USP'U]/ML
INJECTION, SOLUTION INTRAVENOUS; SUBCUTANEOUS
Status: DISCONTINUED | OUTPATIENT
Start: 2022-01-24 | End: 2022-01-24

## 2022-01-24 RX ORDER — PROPOFOL 10 MG/ML
VIAL (ML) INTRAVENOUS
Status: DISCONTINUED | OUTPATIENT
Start: 2022-01-24 | End: 2022-01-24

## 2022-01-24 RX ORDER — ONDANSETRON 2 MG/ML
INJECTION INTRAMUSCULAR; INTRAVENOUS
Status: DISCONTINUED | OUTPATIENT
Start: 2022-01-24 | End: 2022-01-24

## 2022-01-24 RX ORDER — ALPRAZOLAM 0.5 MG/1
0.5 TABLET ORAL ONCE
Status: COMPLETED | OUTPATIENT
Start: 2022-01-24 | End: 2022-01-24

## 2022-01-24 RX ORDER — HALOPERIDOL 5 MG/ML
0.5 INJECTION INTRAMUSCULAR EVERY 10 MIN PRN
Status: DISCONTINUED | OUTPATIENT
Start: 2022-01-24 | End: 2022-01-25 | Stop reason: HOSPADM

## 2022-01-24 RX ORDER — METOPROLOL SUCCINATE 50 MG/1
50 TABLET, EXTENDED RELEASE ORAL 2 TIMES DAILY
Status: DISCONTINUED | OUTPATIENT
Start: 2022-01-24 | End: 2022-01-25 | Stop reason: HOSPADM

## 2022-01-24 RX ORDER — LISINOPRIL 20 MG/1
40 TABLET ORAL DAILY
Status: DISCONTINUED | OUTPATIENT
Start: 2022-01-24 | End: 2022-01-25 | Stop reason: HOSPADM

## 2022-01-24 RX ORDER — PROCHLORPERAZINE EDISYLATE 5 MG/ML
5 INJECTION INTRAMUSCULAR; INTRAVENOUS EVERY 30 MIN PRN
Status: DISCONTINUED | OUTPATIENT
Start: 2022-01-24 | End: 2022-01-25 | Stop reason: HOSPADM

## 2022-01-24 RX ORDER — HYDROMORPHONE HYDROCHLORIDE 1 MG/ML
0.2 INJECTION, SOLUTION INTRAMUSCULAR; INTRAVENOUS; SUBCUTANEOUS EVERY 5 MIN PRN
Status: DISCONTINUED | OUTPATIENT
Start: 2022-01-24 | End: 2022-01-25 | Stop reason: HOSPADM

## 2022-01-24 RX ORDER — DIPHENHYDRAMINE HYDROCHLORIDE 50 MG/ML
25 INJECTION INTRAMUSCULAR; INTRAVENOUS EVERY 6 HOURS PRN
Status: DISCONTINUED | OUTPATIENT
Start: 2022-01-24 | End: 2022-01-25 | Stop reason: HOSPADM

## 2022-01-24 RX ORDER — ROCURONIUM BROMIDE 10 MG/ML
INJECTION, SOLUTION INTRAVENOUS
Status: DISCONTINUED | OUTPATIENT
Start: 2022-01-24 | End: 2022-01-24

## 2022-01-24 RX ORDER — ATORVASTATIN CALCIUM 20 MG/1
40 TABLET, FILM COATED ORAL NIGHTLY
Status: DISCONTINUED | OUTPATIENT
Start: 2022-01-24 | End: 2022-01-25 | Stop reason: HOSPADM

## 2022-01-24 RX ORDER — FENTANYL CITRATE 50 UG/ML
25 INJECTION, SOLUTION INTRAMUSCULAR; INTRAVENOUS EVERY 5 MIN PRN
Status: DISCONTINUED | OUTPATIENT
Start: 2022-01-24 | End: 2022-01-25 | Stop reason: HOSPADM

## 2022-01-24 RX ORDER — SODIUM CHLORIDE 0.9 % (FLUSH) 0.9 %
3 SYRINGE (ML) INJECTION
Status: DISCONTINUED | OUTPATIENT
Start: 2022-01-24 | End: 2022-01-25 | Stop reason: HOSPADM

## 2022-01-24 RX ORDER — HEPARIN SODIUM 10000 [USP'U]/100ML
INJECTION, SOLUTION INTRAVENOUS CONTINUOUS PRN
Status: DISCONTINUED | OUTPATIENT
Start: 2022-01-24 | End: 2022-01-24

## 2022-01-24 RX ORDER — FUROSEMIDE 20 MG/1
40 TABLET ORAL 2 TIMES DAILY
Status: DISCONTINUED | OUTPATIENT
Start: 2022-01-24 | End: 2022-01-24

## 2022-01-24 RX ORDER — ALPRAZOLAM 0.25 MG/1
0.5 TABLET ORAL ONCE
Status: COMPLETED | OUTPATIENT
Start: 2022-01-24 | End: 2022-01-24

## 2022-01-24 RX ORDER — PROTAMINE SULFATE 10 MG/ML
INJECTION, SOLUTION INTRAVENOUS
Status: DISCONTINUED | OUTPATIENT
Start: 2022-01-24 | End: 2022-01-24

## 2022-01-24 RX ORDER — ATORVASTATIN CALCIUM 20 MG/1
40 TABLET, FILM COATED ORAL DAILY
Status: DISCONTINUED | OUTPATIENT
Start: 2022-01-24 | End: 2022-01-24

## 2022-01-24 RX ORDER — SUCCINYLCHOLINE CHLORIDE 20 MG/ML
INJECTION INTRAMUSCULAR; INTRAVENOUS
Status: DISCONTINUED | OUTPATIENT
Start: 2022-01-24 | End: 2022-01-24

## 2022-01-24 RX ORDER — LIDOCAINE HYDROCHLORIDE 20 MG/ML
INJECTION, SOLUTION INFILTRATION; PERINEURAL
Status: DISCONTINUED | OUTPATIENT
Start: 2022-01-24 | End: 2022-01-25 | Stop reason: HOSPADM

## 2022-01-24 RX ORDER — EPHEDRINE SULFATE 50 MG/ML
INJECTION, SOLUTION INTRAVENOUS
Status: DISCONTINUED | OUTPATIENT
Start: 2022-01-24 | End: 2022-01-24

## 2022-01-24 RX ORDER — POTASSIUM CHLORIDE 20 MEQ/1
20 TABLET, EXTENDED RELEASE ORAL DAILY
Status: DISCONTINUED | OUTPATIENT
Start: 2022-01-25 | End: 2022-01-25 | Stop reason: HOSPADM

## 2022-01-24 RX ORDER — FUROSEMIDE 10 MG/ML
INJECTION INTRAMUSCULAR; INTRAVENOUS
Status: DISCONTINUED | OUTPATIENT
Start: 2022-01-24 | End: 2022-01-24

## 2022-01-24 RX ORDER — LIDOCAINE HYDROCHLORIDE 20 MG/ML
INJECTION INTRAVENOUS
Status: DISCONTINUED | OUTPATIENT
Start: 2022-01-24 | End: 2022-01-24

## 2022-01-24 RX ORDER — DULOXETIN HYDROCHLORIDE 60 MG/1
120 CAPSULE, DELAYED RELEASE ORAL DAILY
Status: DISCONTINUED | OUTPATIENT
Start: 2022-01-25 | End: 2022-01-25 | Stop reason: HOSPADM

## 2022-01-24 RX ORDER — PHENYLEPHRINE HYDROCHLORIDE 10 MG/ML
INJECTION INTRAVENOUS
Status: DISCONTINUED | OUTPATIENT
Start: 2022-01-24 | End: 2022-01-24

## 2022-01-24 RX ORDER — MIDAZOLAM HYDROCHLORIDE 1 MG/ML
INJECTION, SOLUTION INTRAMUSCULAR; INTRAVENOUS
Status: DISCONTINUED | OUTPATIENT
Start: 2022-01-24 | End: 2022-01-24

## 2022-01-24 RX ORDER — ACETAMINOPHEN 325 MG/1
650 TABLET ORAL EVERY 4 HOURS PRN
Status: DISCONTINUED | OUTPATIENT
Start: 2022-01-24 | End: 2022-01-25 | Stop reason: HOSPADM

## 2022-01-24 RX ORDER — FUROSEMIDE 40 MG/1
40 TABLET ORAL 2 TIMES DAILY
Status: DISCONTINUED | OUTPATIENT
Start: 2022-01-24 | End: 2022-01-25 | Stop reason: HOSPADM

## 2022-01-24 RX ORDER — FENTANYL CITRATE 50 UG/ML
INJECTION, SOLUTION INTRAMUSCULAR; INTRAVENOUS
Status: DISCONTINUED | OUTPATIENT
Start: 2022-01-24 | End: 2022-01-24

## 2022-01-24 RX ORDER — DEXAMETHASONE SODIUM PHOSPHATE 4 MG/ML
INJECTION, SOLUTION INTRA-ARTICULAR; INTRALESIONAL; INTRAMUSCULAR; INTRAVENOUS; SOFT TISSUE
Status: DISCONTINUED | OUTPATIENT
Start: 2022-01-24 | End: 2022-01-24

## 2022-01-24 RX ADMIN — PROPAFENONE HYDROCHLORIDE 225 MG: 150 TABLET, FILM COATED ORAL at 10:01

## 2022-01-24 RX ADMIN — PHENYLEPHRINE HYDROCHLORIDE 100 MCG: 10 INJECTION, SOLUTION INTRAMUSCULAR; INTRAVENOUS; SUBCUTANEOUS at 07:01

## 2022-01-24 RX ADMIN — HYDROMORPHONE HYDROCHLORIDE 0.2 MG: 1 INJECTION, SOLUTION INTRAMUSCULAR; INTRAVENOUS; SUBCUTANEOUS at 08:01

## 2022-01-24 RX ADMIN — PHENYLEPHRINE HYDROCHLORIDE 200 MCG: 10 INJECTION, SOLUTION INTRAMUSCULAR; INTRAVENOUS; SUBCUTANEOUS at 09:01

## 2022-01-24 RX ADMIN — EPHEDRINE SULFATE 15 MG: 50 INJECTION INTRAMUSCULAR; INTRAVENOUS; SUBCUTANEOUS at 07:01

## 2022-01-24 RX ADMIN — HEPARIN SODIUM AND DEXTROSE 12 UNITS/KG/HR: 10000; 5 INJECTION INTRAVENOUS at 08:01

## 2022-01-24 RX ADMIN — ACETAMINOPHEN 650 MG: 325 TABLET ORAL at 12:01

## 2022-01-24 RX ADMIN — HEPARIN SODIUM 3000 UNITS: 1000 INJECTION INTRAVENOUS; SUBCUTANEOUS at 10:01

## 2022-01-24 RX ADMIN — GLYCOPYRROLATE 0.4 MG: 0.2 INJECTION, SOLUTION INTRAMUSCULAR; INTRAVENOUS at 07:01

## 2022-01-24 RX ADMIN — HEPARIN SODIUM 16000 UNITS: 1000 INJECTION INTRAVENOUS; SUBCUTANEOUS at 08:01

## 2022-01-24 RX ADMIN — DEXAMETHASONE SODIUM PHOSPHATE 8 MG: 4 INJECTION, SOLUTION INTRAMUSCULAR; INTRAVENOUS at 07:01

## 2022-01-24 RX ADMIN — SODIUM CHLORIDE: 0.9 INJECTION, SOLUTION INTRAVENOUS at 07:01

## 2022-01-24 RX ADMIN — ALPRAZOLAM 0.5 MG: 0.5 TABLET ORAL at 05:01

## 2022-01-24 RX ADMIN — PROPOFOL 50 MG: 10 INJECTION, EMULSION INTRAVENOUS at 08:01

## 2022-01-24 RX ADMIN — HYDROMORPHONE HYDROCHLORIDE 0.2 MG: 1 INJECTION, SOLUTION INTRAMUSCULAR; INTRAVENOUS; SUBCUTANEOUS at 10:01

## 2022-01-24 RX ADMIN — ACETAMINOPHEN 650 MG: 325 TABLET ORAL at 04:01

## 2022-01-24 RX ADMIN — PHENYLEPHRINE HYDROCHLORIDE 300 MCG: 10 INJECTION, SOLUTION INTRAMUSCULAR; INTRAVENOUS; SUBCUTANEOUS at 07:01

## 2022-01-24 RX ADMIN — PHENYLEPHRINE HYDROCHLORIDE 200 MCG: 10 INJECTION, SOLUTION INTRAMUSCULAR; INTRAVENOUS; SUBCUTANEOUS at 07:01

## 2022-01-24 RX ADMIN — PROPOFOL 150 MG: 10 INJECTION, EMULSION INTRAVENOUS at 07:01

## 2022-01-24 RX ADMIN — PROTAMINE SULFATE 80 MG: 10 INJECTION, SOLUTION INTRAVENOUS at 11:01

## 2022-01-24 RX ADMIN — ONDANSETRON 4 MG: 2 INJECTION INTRAMUSCULAR; INTRAVENOUS at 10:01

## 2022-01-24 RX ADMIN — HEPARIN SODIUM 7000 UNITS: 1000 INJECTION INTRAVENOUS; SUBCUTANEOUS at 08:01

## 2022-01-24 RX ADMIN — EPHEDRINE SULFATE 10 MG: 50 INJECTION INTRAMUSCULAR; INTRAVENOUS; SUBCUTANEOUS at 07:01

## 2022-01-24 RX ADMIN — SODIUM CHLORIDE 0.25 MCG/KG/MIN: 9 INJECTION, SOLUTION INTRAVENOUS at 07:01

## 2022-01-24 RX ADMIN — APIXABAN 5 MG: 5 TABLET, FILM COATED ORAL at 08:01

## 2022-01-24 RX ADMIN — PROPOFOL 50 MG: 10 INJECTION, EMULSION INTRAVENOUS at 07:01

## 2022-01-24 RX ADMIN — METOPROLOL SUCCINATE 50 MG: 50 TABLET, EXTENDED RELEASE ORAL at 08:01

## 2022-01-24 RX ADMIN — ROCURONIUM BROMIDE 5 MG: 10 INJECTION, SOLUTION INTRAVENOUS at 07:01

## 2022-01-24 RX ADMIN — PHENYLEPHRINE HYDROCHLORIDE 200 MCG: 10 INJECTION, SOLUTION INTRAMUSCULAR; INTRAVENOUS; SUBCUTANEOUS at 08:01

## 2022-01-24 RX ADMIN — FUROSEMIDE 40 MG: 40 TABLET ORAL at 08:01

## 2022-01-24 RX ADMIN — MIDAZOLAM HYDROCHLORIDE 2 MG: 1 INJECTION, SOLUTION INTRAMUSCULAR; INTRAVENOUS at 07:01

## 2022-01-24 RX ADMIN — EPHEDRINE SULFATE 10 MG: 50 INJECTION INTRAMUSCULAR; INTRAVENOUS; SUBCUTANEOUS at 08:01

## 2022-01-24 RX ADMIN — ATORVASTATIN CALCIUM 40 MG: 20 TABLET, FILM COATED ORAL at 08:01

## 2022-01-24 RX ADMIN — EPHEDRINE SULFATE 15 MG: 50 INJECTION INTRAMUSCULAR; INTRAVENOUS; SUBCUTANEOUS at 08:01

## 2022-01-24 RX ADMIN — HYDROMORPHONE HYDROCHLORIDE 0.2 MG: 1 INJECTION, SOLUTION INTRAMUSCULAR; INTRAVENOUS; SUBCUTANEOUS at 12:01

## 2022-01-24 RX ADMIN — FENTANYL CITRATE 100 MCG: 50 INJECTION, SOLUTION INTRAMUSCULAR; INTRAVENOUS at 07:01

## 2022-01-24 RX ADMIN — HYDROMORPHONE HYDROCHLORIDE 0.2 MG: 1 INJECTION, SOLUTION INTRAMUSCULAR; INTRAVENOUS; SUBCUTANEOUS at 01:01

## 2022-01-24 RX ADMIN — FUROSEMIDE 40 MG: 10 INJECTION, SOLUTION INTRAVENOUS at 11:01

## 2022-01-24 RX ADMIN — SUCCINYLCHOLINE CHLORIDE 120 MG: 20 INJECTION, SOLUTION INTRAMUSCULAR; INTRAVENOUS at 07:01

## 2022-01-24 RX ADMIN — ALPRAZOLAM 0.5 MG: 0.25 TABLET ORAL at 01:01

## 2022-01-24 RX ADMIN — LIDOCAINE HYDROCHLORIDE 100 MG: 20 INJECTION, SOLUTION INTRAVENOUS at 07:01

## 2022-01-24 NOTE — Clinical Note
The groin was prepped. The site was prepped with ChloraPrep. The site was clipped. The patient was draped. The patient was positioned supine.

## 2022-01-24 NOTE — PROGRESS NOTES
Received pt to floor from home accompanied by sister  MONO x 4. Denies pain or discomfort. Respirations even and unlabored. No distress noted. Pt stable.  Admit assessment complete. IV x 1 placed.  Pt oriented to room and call bell placed within reach.  Will continue to monitor.

## 2022-01-24 NOTE — ANESTHESIA RELEASE NOTE
Anesthesia Release from PACU Note    Patient: Vicky Alvarado    Procedure(s) Performed: Procedure(s) (LRB):  Ablation atrial fibrillation (N/A)  ABLATION, ATRIAL FLUTTER, TYPICAL (N/A)  Cardioversion or Defibrillation  Ablation, Atrial Flutter, Atypical    Anesthesia type: general    Post pain: Adequate analgesia    Post assessment: no apparent anesthetic complications, tolerated procedure well and no evidence of recall    Post vital signs:   Vitals:    01/24/22 1345   BP:    Pulse: 78   Resp: 18   Temp: 36.7 °C (98.1 °F)        Level of consciousness: awake, alert  and oriented    Nausea/Vomiting: no nausea/no vomiting    Complications: none    Airway Patency: patent    Respiratory: nasal cannula     Cardiovascular: stable and blood pressure at baseline    Hydration: euvolemic

## 2022-01-24 NOTE — Clinical Note
The defib pads were placed on the patient's chest and back   Normal vision: sees adequately in most situations; can see medication labels, newsprint

## 2022-01-24 NOTE — NURSING TRANSFER
Nursing Transfer Note      1/24/2022     Reason patient is being transferred: ep pacu 3 to 314    Transfer To: ep pacu 3 to 314    Transfer via bed  Transfer with cardiac monitoring. Tele box on confirmed with tele tech 2l nc portable oxygen    Transported by lucie soto and livia cath transporter    Medicines sent: silvadene, Lipitor po with chart    Any special needs or follow-up needed: bedrest x6hrs. Suture removal at 1505    Chart send with patient: Yes    Notified: tristin pt's sister    Patient reassessed at: 1/24/2022 1430. Next due 1500    Upon arrival to floor: cardiac monitor applied, patient oriented to room, call bell in reach and bed in lowest position, 2l nc wall oxygen

## 2022-01-24 NOTE — PLAN OF CARE
"Vss. sats %, 2l nc on. Pt denies sob.  Pt's sister has cpap machine and will bring to room.  Pt aaox4 following commands.  Unable to use left leg. Limb alert on left le.  Pt wears upper dentures and glasses. Did not want pacu rn to get them, wanted to wait until gets to csu room. Sister has all pt's belongings with her.  Will bring to room 314.  Tele box on confirmed by tele tech. sr noted.  12 lead ekg done and in chart per md order. Dr tran did update pt in ep pacu 3, pt does not remember.  Pt's sister updated multiple times by ep pacu rn. Verbalizes understanding and got to talk to sister over the phone twice. S/p ablation.  Right ij access done, manual pressure in ep lab, hemostasis 1135. Guaze/trans film noted. No hematoma or drainage noted. Right groin has sutures, guaze/tensoplast. Sutures placed at 1105, removal time 1505, bedrest done 1705. Prior to removal of sutures at 1505, gauze/tensoplast drsg to be removed. csu rn aware simon. Incision sites accessed with rn upon arrival to csu. No hematoma or drainage to both.  Palpable pulses noted. Pt arrived from ep lab with branch, secured to left leg. Clear yellow urine noted. See I/o flowsheet for volume out. crna gave 40mg iv lasix prior to arrival to ep pacu 3. Dr gustafson aware that lasix was given. meds changed per md order to be given tonight lasix po since pt received a dose in lab. Pt did ask for xanax in ep pacu 3. Dr putnam aware that she did not take last night or this am.  .5mg xanax given po per md order x1. Pt was calm and cooperative. Pt remains in hospital bed. Pt tolerating water in ep pacu 3.  Pt complaints of "lower back pain and shoulder zay pain".  Ep pacu rn repositioned pt multiple times in bed. Pillow placed under left back/sacral area, blanket roll to right hip area.  zay blanket rolls under knee. Pt educated that she can bend left leg. Right leg to stay straight due to sutures. [place] rolls under both shoulders. Pt offered " "warm packs for shoulders, did not want due to being hot.  Pt educated on repositioning and moving arms for ROM. Verbalizes understanding. S/p cardioversion x1 in ep lab. Silvadene to chest and back. Silvadene ointment with pt to room 314. Pt takes lipitor at night. md aware and order changed. Brought to csu. In chart, given to csu rn.  pacu rn did not given any bp meds. csu rn and md aware. csu rn to assess bp and clarify with md.  See flowsheet for full assessment. Prn po and iv pain meds given per md order. At time of transfer, pt "tolerable".   "

## 2022-01-24 NOTE — ANESTHESIA PROCEDURE NOTES
Intubation    Date/Time: 1/24/2022 7:34 AM  Performed by: Estelle Arnold CRNA  Authorized by: Karl Escalante MD     Intubation:     Induction:  Intravenous    Intubated:  Postinduction    Mask Ventilation:  Moderately difficult with oral airway    Attempts:  1    Attempted By:  CRNA    Method of Intubation:  Video laryngoscopy    Blade:  Mcnair 3    Laryngeal View Grade: Grade I - full view of cords      Difficult Airway Encountered?: No      Complications:  None    Airway Device:  Oral endotracheal tube    Airway Device Size:  7.0    Style/Cuff Inflation:  Cuffed (inflated to minimal occlusive pressure)    Inflation Amount (mL):  7    Tube secured:  22    Secured at:  The lips    Placement Verified By:  Capnometry    Complicating Factors:  Obesity, short neck and oropharyngeal edema or fat    Findings Post-Intubation:  BS equal bilateral and atraumatic/condition of teeth unchanged

## 2022-01-24 NOTE — TRANSFER OF CARE
"Anesthesia Transfer of Care Note    Patient: Vicky Alvarado    Procedure(s) Performed: Procedure(s) (LRB):  Ablation atrial fibrillation (N/A)  ABLATION, ATRIAL FLUTTER, TYPICAL (N/A)  Cardioversion or Defibrillation  Ablation, Atrial Flutter, Atypical    Patient location: Cath Lab    Anesthesia Type: general    Transport from OR: Transported from OR on 6-10 L/min O2 by face mask with adequate spontaneous ventilation    Post pain: adequate analgesia    Post assessment: no apparent anesthetic complications    Post vital signs: stable    Level of consciousness: awake, alert and oriented    Nausea/Vomiting: no nausea/vomiting    Complications: none    Transfer of care protocol was followed      Last vitals:   Visit Vitals  /83 (BP Location: Left arm, Patient Position: Lying)   Pulse 81   Temp 36.5 °C (97.7 °F) (Temporal)   Resp 20   Ht 5' 6" (1.676 m)   Wt (!) 142.9 kg (315 lb)   LMP 06/08/2015   SpO2 96%   Breastfeeding No   BMI 50.84 kg/m²     "

## 2022-01-24 NOTE — ANESTHESIA PREPROCEDURE EVALUATION
01/24/2022  Pre-operative evaluation for Procedure(s) (LRB):  Ablation atrial fibrillation (N/A)  ABLATION, ATRIAL FLUTTER, TYPICAL (N/A)  Transesophageal echo (REENA) intra-procedure log documentation (N/A)    Vicky Alvarado is a 56 y.o. female with afib here for redo PVI, last echo reviewed: normal biventricular systolic function    Patient Active Problem List   Diagnosis    Opiate dependence    Opioid dependence in remission    Essential hypertension    Atrial Fibrillation (paroxysmal)    Avascular necrosis of lunate    History of opioid abuse    Severe obesity (BMI >= 40)    Type 2 diabetes mellitus with diabetic polyneuropathy, without long-term current use of insulin    Right carpal tunnel syndrome    Weakness of right hand    ERENDIRA (obstructive sleep apnea)    Depression    Wound dehiscence    Carpal tunnel syndrome    Ulnar neuropathy at elbow of left upper extremity    Range of motion deficit    Edema of hand    Pure hypercholesterolemia    Mild aortic stenosis    Microcytic anemia    Colon cancer screening       Review of patient's allergies indicates:   Allergen Reactions    Flecainide Shortness Of Breath and Swelling       No current facility-administered medications on file prior to encounter.     Current Outpatient Medications on File Prior to Encounter   Medication Sig Dispense Refill    atorvastatin (LIPITOR) 40 MG tablet Take 1 tablet (40 mg total) by mouth once daily. 90 tablet 3    b complex vitamins capsule Take 1 capsule by mouth once daily.      colchicine (COLCRYS) 0.6 mg tablet For gout attack: Take TWO tablets by mouth, then, 2 hours later, take ONE additional tablet. Then take 1 tablet twice daily only if still needed for gout pain. (Patient taking differently: For gout attack: Take TWO tablets by mouth, then, 2 hours later, take ONE additional tablet.  Then take 1 tablet twice daily only if still needed for gout pain.) 20 tablet 1    DULoxetine (CYMBALTA) 60 MG capsule Take 2 capsules (120 mg total) by mouth once daily. 180 capsule 3    ferrous sulfate (SLOW RELEASE IRON) 142 mg (45 mg iron) TbSR Take by mouth.      furosemide (LASIX) 40 MG tablet Take 1 tablet (40 mg total) by mouth 2 (two) times daily. 60 tablet 11    gluc-shikha-[facility]#7-B-vsrh-reymundo-bor 750 mg-644 mg- 30 mg-1 mg Tab Take 1 tablet by mouth once daily.       krill/om-3/dha/epa/phospho/ast (MEGARED OMEGA-3 KRILL OIL ORAL)       lisinopriL (PRINIVIL,ZESTRIL) 40 MG tablet Take 1 tablet (40 mg total) by mouth once daily. 90 tablet 3    magnesium oxide (MAG-OX) 400 mg (241.3 mg magnesium) tablet Take 400 mg by mouth once daily.      MELATONIN ORAL Take 1-2 tablets by mouth nightly as needed (Sleep).      methylPREDNISolone (MEDROL DOSEPACK) 4 mg tablet use as directed on pack 21 tablet 0    metoprolol succinate (TOPROL-XL) 50 MG 24 hr tablet Take 1 tablet (50 mg total) by mouth 2 (two) times daily. 180 tablet 3    multivitamin (THERAGRAN) per tablet Take 1 tablet by mouth once daily.      NIFEdipine (PROCARDIA-XL) 90 MG (OSM) 24 hr tablet Take 1 tablet (90 mg total) by mouth once daily. 30 tablet 11    omeprazole (PRILOSEC) 20 MG capsule TAKE 1 CAPSULE BY MOUTH ONCE DAILY 90 capsule 3    traZODone (DESYREL) 100 MG tablet Take 1 tablet (100 mg total) by mouth nightly as needed for Insomnia. 90 tablet 3    ALPRAZolam (XANAX) 0.5 MG tablet Take 1 tablet (0.5 mg total) by mouth 2 (two) times daily as needed for Anxiety. 60 tablet 4    potassium chloride SA (K-DUR,KLOR-CON) 20 MEQ tablet Take 1 tablet (20 mEq total) by mouth once daily. 30 tablet 11    predniSONE (DELTASONE) 10 MG tablet take 1 tablet by mouth daily 10 tablet 0    propafenone (RYTHMOL) 225 MG Tab Take 1 tablet (225 mg total) by mouth every 8 (eight) hours. 90 tablet 11       Past Surgical History:   Procedure Laterality Date     ABLATION OF ARRHYTHMOGENIC FOCUS FOR ATRIAL FIBRILLATION N/A 7/20/2020    Procedure: Ablation atrial fibrillation;  Surgeon: Gabriel Hawley MD;  Location: [facility] EP LAB;  Service: Cardiology;  Laterality: N/A;  afib, PVI, RFA, REENA, SHADY, anes, MB, 3 Prep    APPLICATION OF WOUND VACUUM-ASSISTED CLOSURE DEVICE Left 8/3/2020    Procedure: APPLICATION, WOUND VAC;  Surgeon: SEMAJ Márquez III, MD;  Location: [facility] OR 2ND FLR;  Service: Peripheral Vascular;  Laterality: Left;    APPLICATION OF WOUND VACUUM-ASSISTED CLOSURE DEVICE Left 8/6/2020    Procedure: APPLICATION, WOUND VAC;  Surgeon: SEMAJ Márquez III, MD;  Location: [facility] OR 2ND FLR;  Service: Peripheral Vascular;  Laterality: Left;    CARPAL TUNNEL RELEASE Right 6/10/2020    Procedure: RELEASE, CARPAL TUNNEL - RIGHT;  Surgeon: Adelaida Hall MD;  Location: [facility] OR;  Service: Orthopedics;  Laterality: Right;  GENERAL AND REGIONAL    CARPAL TUNNEL RELEASE Left 5/5/2021    Procedure: RELEASE, CARPAL TUNNEL, LEFT;  Surgeon: Adelaida Hall MD;  Location: [facility] OR;  Service: Orthopedics;  Laterality: Left;  GENERAL & REGIONAL IN PACU    CLOSURE OF WOUND Left 8/6/2020    Procedure: CLOSURE, WOUND;  Surgeon: SEMAJ Márquez III, MD;  Location: [facility] OR 2ND FLR;  Service: Peripheral Vascular;  Laterality: Left;  Complex    COLONOSCOPY N/A 8/17/2021    Procedure: COLONOSCOPY Suprep;  Surgeon: Loco Deluca MD;  Location: [facility];  Service: Endoscopy;  Laterality: N/A;    ESOPHAGOGASTRODUODENOSCOPY N/A 8/17/2021    Procedure: EGD (ESOPHAGOGASTRODUODENOSCOPY);  Surgeon: Loco Deluca MD;  Location: [facility];  Service: Endoscopy;  Laterality: N/A;  Patient is schedule to have her Covid test on 08/14/2021 at the [facility] @ 9:30am. [place].    EXPLORATION OF FEMORAL ARTERY Left 7/21/2020    Procedure: EXPLORATION, ARTERY, FEMORAL;  Surgeon: SEMAJ Márquez III, MD;  Location: [facility] OR 2ND FLR;  Service: Peripheral Vascular;   Laterality: Left;  1. Urgent Direct repair, L SFA branch laceration    FOOT SURGERY      TREATMENT OF CARDIAC ARRHYTHMIA N/A 2020    Procedure: CARDIOVERSION;  Surgeon: Gabriel Hawley MD;  Location: [facility] EP LAB;  Service: Cardiology;  Laterality: N/A;  af, demi, dccv, anes, mb, 345    WOUND DEBRIDEMENT Left 2020    Procedure: DEBRIDEMENT, WOUND;  Surgeon: SEMAJ Márquez III, MD;  Location: [facility] OR [address] [place] [address];  Service: Peripheral Vascular;  Laterality: Left;       Social History     Socioeconomic History    Marital status: Single   Tobacco Use    Smoking status: Former Smoker     Types: Cigarettes     Quit date: 2019     Years since quittin.5    Smokeless tobacco: Never Used   Substance and Sexual Activity    Alcohol use: No    Drug use: No           Anesthesia Evaluation    I have reviewed the Patient Summary Reports.    I have reviewed the Nursing Notes. I have reviewed the NPO Status.      Review of Systems  Anesthesia Hx:  No problems with previous Anesthesia    Cardiovascular:   Hypertension CHF    Pulmonary:   Sleep Apnea    Hepatic/GI:   GERD    Neurological:   Neuromuscular Disease,    Endocrine:   Diabetes    Psych:   Psychiatric History          Physical Exam  General:  Obesity    Airway/Jaw/Neck:  Airway Findings: Mouth Opening: Normal Tongue: Normal  TM Distance: Normal, at least 6 cm       Chest/Lungs:  Chest/Lungs Findings: Normal Respiratory Rate     Heart/Vascular:  Heart Findings: Rate: Normal             Anesthesia Plan  Type of Anesthesia, risks & benefits discussed:  Anesthesia Type:  general    Patient's Preference:   Plan Factors:          Intra-op Monitoring Plan: arterial line and standard ASA monitors  Intra-op Monitoring Plan Comments:   Post Op Pain Control Plan: multimodal analgesia  Post Op Pain Control Plan Comments:     Induction:   IV  Beta Blocker:         Informed Consent: Patient understands risks and agrees with Anesthesia plan.  Questions answered.  Anesthesia consent signed with patient.  ASA Score: 3     Day of Surgery Review of History & Physical:            Ready For Surgery From Anesthesia Perspective.

## 2022-01-24 NOTE — H&P
[facility] - Short Stay Cardiac Unit  Cardiac Electrophysiology  History and Physical     Admission Date: 1/24/2022  Code Status: Prior   Attending Provider: Gabriel Hawley MD   Principal Problem:<principal problem not specified>    Subjective:     Chief Complaint:  AF     HPI:  Vicky Alvarado is a 56 y.o. female who presents for RFA for refractory Atrial Fibrillation despite use of AAD.    RF re-do with CTI and PWI. Prior RFA PVI done 7/21/20 which was complicated by SFA laceration resulting in a  large left SFA hematoma. Vascular surgery consulted and on 7/21/20 patient underwent left SFA exploration with direct repair of L SFA branch laceration    Relevant history:   7/7/2020: 1/9/20, she had her upper teeth pulled for dentures and been on penicillin since that time. She was in SR. She takes atenolol 10 mg q.d. for hypertension. She underwent REENA/CV 1/29/2020. EF 40% in AF. PET Stress 3/2020 revealed no ischemia. Repeat echo in NSR showed EF 60%. She has had recurrence, once during carpal tunnel surgery, and several other times since.  Regarding her family history, her mother and brother both have atrial fibrillation. She is on elquis for CVA prophylaxis. CHADSVASC of at least 3. No bleeding issues with xarelto.      10/2020: PVI 7/20/2020. 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 She underwent emergent exploratory surgery by Dr Rose who ligated a branch of her left SFA. She had further wound issues requiring wash out and wound vac over the next few months. This has been a challenging recovery but she is nearing completion. No symptomatic or documented AF recurrence.      3/21: AF/RVR recurrence with ER visit. Flecainide 100 mg bid started 3/1/21. No recurrence since. Some PVCs     6/21: Her leg incision has healed. She has resumed work. Over the past month, she has developed more NAPIER.      9/21/21: HTN meds adjusted. Lasix started, hctz stopped, nifedipine started. Labs  showed anemia. Endoscopy with no lesions. Respiratory evaluation performed without obvious culprit. LE edema worsened. She stopped flecainide with resolution of symptoms.  She has had some palpitations.      Interval history: rythmol started for pAF. She feels that her AF has returned recently with some long sustained events. In SR today. She feels that the AF is compromising her QOL and asks about repeat ablation.      holter 7/21 10% PVCs     Echo 6/30/21:  ·           The left ventricle is normal in size with concentric remodeling and normal systolic function. The estimated ejection fraction is 60%.  ·           Normal left ventricular diastolic function.  ·           Normal right ventricular size with normal right ventricular systolic function.  ·           Normal central venous pressure (3 mmHg).  ·           Aortic valve area is 1.63 cm2; peak velocity is 2.63 m/s; mean gradient is 17 mmHg.  ·           There is mild aortic valve stenosis.  ·           The estimated ejection fraction is 60%.     TTE 5/2020:  ·           Normal left ventricular systolic function. The estimated ejection fraction is 60%.  ·           Severe left atrial enlargement.  ·           Grade I (mild) left ventricular diastolic dysfunction consistent with impaired relaxation.  ·           Normal right ventricular systolic function.  ·           Mild right atrial enlargement.  ·           Mild aortic valve stenosis. Aortic valve area is 1.83 cm2; peak velocity is 2.42 m/s; mean gradient is 12 mmHg.  ·           Mild tricuspid regurgitation.  ·           Normal central venous pressure (3 mmHg).  ·           The estimated PA systolic pressure is 22 mmHg.     PET Stress 3/2020:  The relative PET images are normal showing no clinically significant regional resting or stress induced perfusion defects.    Whole heart absolute myocardial perfusion (cc/min/g) averaged 0.47 cc/min/g at rest (which is reduced), 0.93 cc/min/g at stress (which is  moderately reduced), and 2.03 CFR (which is mildly reduced).    Gated perfusion images showed an ejection fraction of 78% at rest and 72% during stress. Normal ejection fraction is greater than 51%.    Wall motion was normal at rest and during stress.    LV cavity size is normal at rest and stress.    The EKG portion of this study is negative for ischemia.    There were no arrhythmias during stress.    The patient reported no chest pain during the stress test.    There are no prior studies for comparison.      Past Medical History:   Diagnosis Date    Anticoagulant long-term use     Atrial fibrillation     cardioversion    Atrial fibrillation with RVR     Avascular necrosis     L hand    Depression     Encounter for blood transfusion 07/22/2020    GERD (gastroesophageal reflux disease)     Hx of psychiatric care     Hypertension     Obese     Psychiatric problem     Sleep apnea        Past Surgical History:   Procedure Laterality Date    ABLATION OF ARRHYTHMOGENIC FOCUS FOR ATRIAL FIBRILLATION N/A 7/20/2020    Procedure: Ablation atrial fibrillation;  Surgeon: Gabriel Hawley MD;  Location: [facility] EP LAB;  Service: Cardiology;  Laterality: N/A;  afib, PVI, RFA, REENA, SHADY, anes, MB, 3 Prep    APPLICATION OF WOUND VACUUM-ASSISTED CLOSURE DEVICE Left 8/3/2020    Procedure: APPLICATION, WOUND VAC;  Surgeon: SEMAJ Márquez III, MD;  [facility];  Service: Peripheral Vascular;  Laterality: Left;    APPLICATION OF WOUND VACUUM-ASSISTED CLOSURE DEVICE Left 8/6/2020    Procedure: APPLICATION, WOUND VAC;  Surgeon: SEMAJ Márquez III, MD;  [address];  Service: Peripheral Vascular;  Laterality: Left;    CARPAL TUNNEL RELEASE Right 6/10/2020    Procedure: RELEASE, CARPAL TUNNEL - RIGHT;  Surgeon: Adelaida Hall MD;  Location: [facility];  Service: Orthopedics;  Laterality: Right;  GENERAL AND REGIONAL    CARPAL TUNNEL RELEASE Left 5/5/2021    Procedure: RELEASE, CARPAL  TUNNEL, LEFT;  Surgeon: Adelaida Hall MD;  Location: [facility] OR;  Service: Orthopedics;  Laterality: Left;  GENERAL & REGIONAL IN PACU    CLOSURE OF WOUND Left 8/6/2020    Procedure: CLOSURE, WOUND;  Surgeon: SEMAJ Márquez III, MD;  Location: [address];  Service: Peripheral Vascular;  Laterality: Left;  Complex    COLONOSCOPY N/A 8/17/2021    Procedure: COLONOSCOPY Suprep;  Surgeon: Loco Deluca MD;  Location: [facility] ENDO;  Service: Endoscopy;  Laterality: N/A;    ESOPHAGOGASTRODUODENOSCOPY N/A 8/17/2021    Procedure: EGD (ESOPHAGOGASTRODUODENOSCOPY);  Surgeon: Loco Deluca MD;  Location: [facility] ENDO;  Service: Endoscopy;  Laterality: N/A;  Patient is schedule to have her Covid test on 08/14/2021 at the [facility] @ 9:30am. [place].    EXPLORATION OF FEMORAL ARTERY Left 7/21/2020    Procedure: EXPLORATION, ARTERY, FEMORAL;  Surgeon: SEMAJ Márquez III, MD;  Location: [address];  Service: Peripheral Vascular;  Laterality: Left;  1. Urgent Direct repair, L SFA branch laceration    FOOT SURGERY      TREATMENT OF CARDIAC ARRHYTHMIA N/A 1/29/2020    Procedure: CARDIOVERSION;  Surgeon: Gabriel Hawley MD;  Location: [facility] EP LAB;  Service: Cardiology;  Laterality: N/A;  af, demi, dccv, anes, mb, 345    WOUND DEBRIDEMENT Left 8/6/2020    Procedure: DEBRIDEMENT, WOUND;  Surgeon: SEMAJ Márquez III, MD;  Location: [address];  Service: Peripheral Vascular;  Laterality: Left;       Review of patient's allergies indicates:   Allergen Reactions    Flecainide Shortness Of Breath and Swelling       No current facility-administered medications on file prior to encounter.     Current Outpatient Medications on File Prior to Encounter   Medication Sig    ALPRAZolam (XANAX) 0.5 MG tablet Take 1 tablet (0.5 mg total) by mouth 2 (two) times daily as needed for Anxiety.    atorvastatin (LIPITOR) 40 MG tablet Take 1 tablet (40 mg total) by mouth once daily.    b complex vitamins  capsule Take 1 capsule by mouth once daily.    colchicine (COLCRYS) 0.6 mg tablet For gout attack: Take TWO tablets by mouth, then, 2 hours later, take ONE additional tablet. Then take 1 tablet twice daily only if still needed for gout pain. (Patient taking differently: For gout attack: Take TWO tablets by mouth, then, 2 hours later, take ONE additional tablet. Then take 1 tablet twice daily only if still needed for gout pain.)    DULoxetine (CYMBALTA) 60 MG capsule Take 2 capsules (120 mg total) by mouth once daily.    ferrous sulfate (SLOW RELEASE IRON) 142 mg (45 mg iron) TbSR Take by mouth.    furosemide (LASIX) 40 MG tablet Take 1 tablet (40 mg total) by mouth 2 (two) times daily.    gluc[facility]0-K-wske-reymundo-bor 750 mg-644 mg- 30 mg-1 mg Tab Take 1 tablet by mouth once daily.     krill/om-3/dha/epa/phospho/ast (MEGARED OMEGA-3 KRILL OIL ORAL)     lisinopriL (PRINIVIL,ZESTRIL) 40 MG tablet Take 1 tablet (40 mg total) by mouth once daily.    magnesium oxide (MAG-OX) 400 mg (241.3 mg magnesium) tablet Take 400 mg by mouth once daily.    MELATONIN ORAL Take 1-2 tablets by mouth nightly as needed (Sleep).    methylPREDNISolone (MEDROL DOSEPACK) 4 mg tablet use as directed on pack    metoprolol succinate (TOPROL-XL) 50 MG 24 hr tablet Take 1 tablet (50 mg total) by mouth 2 (two) times daily.    multivitamin (THERAGRAN) per tablet Take 1 tablet by mouth once daily.    NIFEdipine (PROCARDIA-XL) 90 MG (OSM) 24 hr tablet Take 1 tablet (90 mg total) by mouth once daily.    omeprazole (PRILOSEC) 20 MG capsule TAKE 1 CAPSULE BY MOUTH ONCE DAILY    potassium chloride SA (K-DUR,KLOR-CON) 20 MEQ tablet Take 1 tablet (20 mEq total) by mouth once daily.    predniSONE (DELTASONE) 10 MG tablet take 1 tablet by mouth daily    propafenone (RYTHMOL) 225 MG Tab Take 1 tablet (225 mg total) by mouth every 8 (eight) hours.    traZODone (DESYREL) 100 MG tablet Take 1 tablet (100 mg total) by mouth nightly as needed  for Insomnia.     Family History     Problem Relation (Age of Onset)    Cataracts Mother    Diabetes Father    Hypertension Mother, Father, Sister, Brother    Thyroid disease Mother        Tobacco Use    Smoking status: Former Smoker     Types: Cigarettes     Quit date: 2019     Years since quittin.5    Smokeless tobacco: Never Used   Substance and Sexual Activity    Alcohol use: No    Drug use: No    Sexual activity: Not on file     Review of Systems   All other systems reviewed and are negative.    Objective:     Vital Signs (Most Recent):    Vital Signs (24h Range):  BP: ()/()   Arterial Line BP: ()/()           There is no height or weight on file to calculate BMI.            Physical Exam  Vitals and nursing note reviewed.   Constitutional:       Appearance: Normal appearance.   HENT:      Head: Normocephalic and atraumatic.   Cardiovascular:      Rate and Rhythm: Normal rate. Rhythm regularr.      Pulses: Normal pulses.      Heart sounds: Normal heart sounds.   Pulmonary:      Effort: Pulmonary effort is normal.      Breath sounds: Normal breath sounds.   Musculoskeletal:      Right lower leg: No edema.      Left lower leg: No edema.   Skin:     General: Skin is warm.       Groin check: left groin with significant scar tissue from prior PVI  Neurological:      General: No focal deficit present.      Mental Status: She is alert and oriented to person, place, and time.         Significant Labs: All pertinent lab results from the last 24 hours have been reviewed.      Assessment and Plan:     * Atrial Fibrillation (paroxysmal)  Plan to perform RFV access x 3 with 1 RIJ access. We had an extensive discussion with patient regarding risks and benefits of PVI/WACA, and the patient would like to proceed. Risks of procedure include (but are not limited to) bleeding, stroke, perforation requiring emergency draining or surgery, AV block, death, AV fistula, AE fistula, PV stenosis. He understood and desires  to proceed. Consents signed.          Geovanna Garcia MD  Cardiac Electrophysiology  [facility] - Short Stay Cardiac Unit

## 2022-01-24 NOTE — ANESTHESIA PROCEDURE NOTES
Arterial    Diagnosis: afib    Patient location during procedure: done in OR    Staffing  Authorizing Provider: Karl Escalante MD  Performing Provider: Karl Escalante MD    Anesthesiologist was present at the time of the procedure.    Preanesthetic Checklist  Completed: patient identified, IV checked, site marked, risks and benefits discussed, surgical consent, monitors and equipment checked, pre-op evaluation, timeout performed and anesthesia consent givenArterial  Skin Prep: chlorhexidine gluconate  Local Infiltration: none  Orientation: right  Location: radial    Catheter Size: 20 G  Catheter placement by Ultrasound guidance. Heme positive aspiration all ports.  Vessel Caliber: medium, patent, compressibility normal  Vascular Doppler:  not done  Needle advanced into vessel with real time Ultrasound guidance.Insertion Attempts: 2  Assessment  Dressing: secured with tape and tegaderm  Patient: Tolerated well

## 2022-01-24 NOTE — HOSPITAL COURSE
Patient is s/p Re-do PVI with CTI line and PWI. RIJ and RFV access was done due to complication during prior PVI with SFA laceration resulting in a  large left SFA hematoma. Vascular surgery consulted and on 7/21/20 patient underwent left SFA exploration with direct repair of L SFA branch laceration. Patient tolerated procedure well. Confirmed bidirectional block. She will continue Eliqius 5mg bid and follow up with Dr. Hawley in clinic in 3 months.

## 2022-01-24 NOTE — BRIEF OP NOTE
: Gabriel Hawley    Post-operative Diagnosis: AF, Aflutter    Procedure Performed: Radiofrequency ablation of the CTI and redo PVI    Description of Procedure: The patient was brought to the EP lab in the fasting state. Prepped and draped in sterile fashion. Safety timeout was performed. Sedation administered by anesthesia staff. Ultrasound guided venous access of the right femoral vein was performed x3 along with right IJ for CS catheter. Grid, CS, and ablation catheters placed. RFA to the CTI with confirmation of bidirectional block. Two transseptal punctures performed using combination of fluoroscopic and ICE guidance. Map created. RFA to isolate all pulmonary veins. ICE confirmed no significant pericardial effusion.     EBL: <10 mL    Specimens Removed: None  Complications: no immediate    Geovanna Garcia MD PGY-5

## 2022-01-24 NOTE — ANESTHESIA POSTPROCEDURE EVALUATION
Anesthesia Post Evaluation    Patient: Vicky Alvarado    Procedure(s) Performed: Procedure(s) (LRB):  Ablation atrial fibrillation (N/A)  ABLATION, ATRIAL FLUTTER, TYPICAL (N/A)  Cardioversion or Defibrillation  Ablation, Atrial Flutter, Atypical    Final Anesthesia Type: general      Patient location during evaluation: PACU  Patient participation: Yes- Able to Participate  Level of consciousness: awake and alert  Post-procedure vital signs: reviewed and stable  Pain management: adequate  Airway patency: patent    PONV status at discharge: No PONV  Anesthetic complications: no      Cardiovascular status: hemodynamically stable  Respiratory status: nasal cannula  Follow-up not needed.          Vitals Value Taken Time   BP 89/54 01/24/22 1332   Temp 36.7 °C (98.1 °F) 01/24/22 1345   Pulse 65 01/24/22 1359   Resp 18 01/24/22 1345   SpO2 97 % 01/24/22 1359   Vitals shown include unvalidated device data.      No case tracking events are documented in the log.      Pain/Tolu Score: Pain Rating Prior to Med Admin: 7 (1/24/2022  1:30 PM)  Tolu Score: 8 (1/24/2022 12:30 PM)

## 2022-01-25 ENCOUNTER — TELEPHONE (OUTPATIENT)
Dept: ELECTROPHYSIOLOGY | Facility: CLINIC | Age: 57
End: 2022-01-25
Payer: COMMERCIAL

## 2022-01-25 VITALS
BODY MASS INDEX: 47.09 KG/M2 | HEART RATE: 66 BPM | HEIGHT: 66 IN | TEMPERATURE: 97 F | DIASTOLIC BLOOD PRESSURE: 61 MMHG | WEIGHT: 293 LBS | RESPIRATION RATE: 18 BRPM | SYSTOLIC BLOOD PRESSURE: 122 MMHG | OXYGEN SATURATION: 96 %

## 2022-01-25 PROCEDURE — 94761 N-INVAS EAR/PLS OXIMETRY MLT: CPT

## 2022-01-25 PROCEDURE — 25000003 PHARM REV CODE 250: Performed by: STUDENT IN AN ORGANIZED HEALTH CARE EDUCATION/TRAINING PROGRAM

## 2022-01-25 PROCEDURE — 63600175 PHARM REV CODE 636 W HCPCS: Performed by: ANESTHESIOLOGY

## 2022-01-25 RX ORDER — PANTOPRAZOLE SODIUM 40 MG/1
40 TABLET, DELAYED RELEASE ORAL DAILY
Qty: 30 TABLET | Refills: 0 | Status: SHIPPED | OUTPATIENT
Start: 2022-01-25 | End: 2022-02-27

## 2022-01-25 RX ADMIN — DULOXETINE 120 MG: 60 CAPSULE, DELAYED RELEASE ORAL at 09:01

## 2022-01-25 RX ADMIN — HYDROMORPHONE HYDROCHLORIDE 0.2 MG: 1 INJECTION, SOLUTION INTRAMUSCULAR; INTRAVENOUS; SUBCUTANEOUS at 04:01

## 2022-01-25 RX ADMIN — APIXABAN 5 MG: 5 TABLET, FILM COATED ORAL at 09:01

## 2022-01-25 RX ADMIN — FUROSEMIDE 40 MG: 40 TABLET ORAL at 09:01

## 2022-01-25 RX ADMIN — PROPAFENONE HYDROCHLORIDE 225 MG: 150 TABLET, FILM COATED ORAL at 05:01

## 2022-01-25 RX ADMIN — METOPROLOL SUCCINATE 50 MG: 50 TABLET, EXTENDED RELEASE ORAL at 09:01

## 2022-01-25 RX ADMIN — LISINOPRIL 40 MG: 20 TABLET ORAL at 09:01

## 2022-01-25 RX ADMIN — NIFEDIPINE 90 MG: 60 TABLET, FILM COATED, EXTENDED RELEASE ORAL at 09:01

## 2022-01-25 NOTE — NURSING
Patient discharged in no acute distress.  IV and telemetry removed.  Discharge instructions reviewed; all questions answered.  All belongings accounted for.

## 2022-01-25 NOTE — PLAN OF CARE
Patient is AAOX4 . Fall precautions in place, call light in reach , bed in lowest position .  NSR on telemetry . V/S Within normal limits. No chest pain, SOB  CHRISTINA . PRN hydromorphone 0.2 mg given for back pain . Puncture site dressing clean and dry . Ambulating in the room independently . Plan of care reviewed with the patient . All questions addressed .

## 2022-01-25 NOTE — DISCHARGE INSTRUCTIONS
Post Ablation Instructions:     · No pushing, pulling, or lifting greater than 5 pounds for ONE week.  · No long car rides (greater than 1 hour) for ONE week.  If a long car ride is required, we ask that you stop every hour and walk around for a few minutes.   · No driving for 24 hours after procedure due to anesthesia.  · Do not soak access site (groin) in water for ONE week (this includes baths, pools, and hot tubs).   · Your groin site(s) may be tender and have some bruising.  This is normal.  A small lump less than the size of a quarter or a marble is normal and can last a couple of weeks.   If you notice any swelling, bleeding, or increase in the size of this lump please call us at [phone].   · It is OK to go up and down home stairs as needed after ablation procedure.      Any need to reschedule or cancel procedures, or any questions regarding your procedures should be addressed directly with the Arrhythmia Department Nurses at the following phone number: [phone].

## 2022-01-25 NOTE — DISCHARGE SUMMARY
[facility] - Cardiology Stepdown  Cardiac Electrophysiology  Discharge Summary      Patient Name: Vicky Alvarado  MRN: [id number]  Admission Date: 1/24/2022  Hospital Length of Stay: 0 days  Discharge Date and Time:  01/25/2022 7:08 AM  Attending Physician: Gabriel Hawley MD    Discharging Provider: Geovanna Garcia MD  Primary Care Physician: Gordy Cordero MD    HPI:   Vicky Alvarado is a 56 y.o. female who presents for RFA for refractory Atrial Fibrillation despite use of AAD.    RF re-do with CTI and PWI. Prior RFA PVI done 7/21/20 which was complicated by SFA laceration resulting in a  large left SFA hematoma. Vascular surgery consulted and on 7/21/20 patient underwent left SFA exploration with direct repair of L SFA branch laceration    Relevant history:   7/7/2020: 1/9/20, she had her upper teeth pulled for dentures and been on penicillin since that time. She was in SR. She takes atenolol 10 mg q.d. for hypertension. She underwent REENA/CV 1/29/2020. EF 40% in AF. PET Stress 3/2020 revealed no ischemia. Repeat echo in NSR showed EF 60%. She has had recurrence, once during carpal tunnel surgery, and several other times since.  Regarding her family history, her mother and brother both have atrial fibrillation. She is on elquis for CVA prophylaxis. CHADSVASC of at least 3. No bleeding issues with xarelto.      10/2020: PVI 7/20/2020. 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 She underwent emergent exploratory surgery by Dr Rose who ligated a branch of her left SFA. She had further wound issues requiring wash out and wound vac over the next few months. This has been a challenging recovery but she is nearing completion. No symptomatic or documented AF recurrence.      3/21: AF/RVR recurrence with ER visit. Flecainide 100 mg bid started 3/1/21. No recurrence since. Some PVCs     6/21: Her leg incision has healed. She has resumed work. Over the past month, she has  developed more NAPIER.      9/21/21: HTN meds adjusted. Lasix started, hctz stopped, nifedipine started. Labs showed anemia. Endoscopy with no lesions. Respiratory evaluation performed without obvious culprit. LE edema worsened. She stopped flecainide with resolution of symptoms.  She has had some palpitations.      Interval history: rythmol started for pAF. She feels that her AF has returned recently with some long sustained events. In SR today. She feels that the AF is compromising her QOL and asks about repeat ablation.      holter 7/21 10% PVCs     Echo 6/30/21:  ·           The left ventricle is normal in size with concentric remodeling and normal systolic function. The estimated ejection fraction is 60%.  ·           Normal left ventricular diastolic function.  ·           Normal right ventricular size with normal right ventricular systolic function.  ·           Normal central venous pressure (3 mmHg).  ·           Aortic valve area is 1.63 cm2; peak velocity is 2.63 m/s; mean gradient is 17 mmHg.  ·           There is mild aortic valve stenosis.  ·           The estimated ejection fraction is 60%.     TTE 5/2020:  ·           Normal left ventricular systolic function. The estimated ejection fraction is 60%.  ·           Severe left atrial enlargement.  ·           Grade I (mild) left ventricular diastolic dysfunction consistent with impaired relaxation.  ·           Normal right ventricular systolic function.  ·           Mild right atrial enlargement.  ·           Mild aortic valve stenosis. Aortic valve area is 1.83 cm2; peak velocity is 2.42 m/s; mean gradient is 12 mmHg.  ·           Mild tricuspid regurgitation.  ·           Normal central venous pressure (3 mmHg).  ·           The estimated PA systolic pressure is 22 mmHg.     PET Stress 3/2020:  The relative PET images are normal showing no clinically significant regional resting or stress induced perfusion defects.    Whole heart absolute myocardial  perfusion (cc/min/g) averaged 0.47 cc/min/g at rest (which is reduced), 0.93 cc/min/g at stress (which is moderately reduced), and 2.03 CFR (which is mildly reduced).    Gated perfusion images showed an ejection fraction of 78% at rest and 72% during stress. Normal ejection fraction is greater than 51%.    Wall motion was normal at rest and during stress.    LV cavity size is normal at rest and stress.    The EKG portion of this study is negative for ischemia.    There were no arrhythmias during stress.    The patient reported no chest pain during the stress test.    There are no prior studies for comparison.      Procedure(s) (LRB):  Ablation atrial fibrillation (N/A)  ABLATION, ATRIAL FLUTTER, TYPICAL (N/A)  Cardioversion or Defibrillation  Ablation, Atrial Flutter, Atypical     Indwelling Lines/Drains at time of discharge:  Lines/Drains/Airways     None                 Hospital Course:  Patient is s/p Re-do PVI with CTI line and PWI. RIJ and RFV access was done due to complication during prior PVI with SFA laceration resulting in a  large left SFA hematoma. Vascular surgery consulted and on 7/21/20 patient underwent left SFA exploration with direct repair of L SFA branch laceration. Patient tolerated procedure well. Confirmed bidirectional block. She will continue Eliqius 5mg bid and follow up with Dr. Hawley in clinic in 3 months.       Goals of Care Treatment Preferences:  Code Status: Full Code      Consults:     Significant Diagnostic Studies: Labs: All labs within the past 24 hours have been reviewed    Pending Diagnostic Studies:     Procedure Component Value Units Date/Time    Transesophageal echo (REENA) [[id number]]     Order Status: Sent Lab Status: No result           Final Active Diagnoses:    Diagnosis Date Noted POA    PRINCIPAL PROBLEM:  Atrial Fibrillation (paroxysmal) [I48.0] 02/26/2020 Yes      Problems Resolved During this Admission:     No new Assessment & Plan notes have been filed  under this hospital service since the last note was generated.  Service: Arrhythmia      Discharged Condition: stable    Disposition:     Follow Up:   Follow-up Information     Gabriel Hawley MD In 3 months.    Specialties: Electrophysiology, Cardiovascular Disease  Contact information:  [facility]  [facility] [address] [phone]                       Patient Instructions:   No discharge procedures on file.  Medications:  Reconciled Home Medications:      Medication List      CONTINUE taking these medications    ALPRAZolam 0.5 MG tablet  Commonly known as: XANAX  Take 1 tablet (0.5 mg total) by mouth 2 (two) times daily as needed for Anxiety.     apixaban 5 mg Tab  Commonly known as: ELIQUIS  Take 1 tablet (5 mg total) by mouth 2 (two) times daily.     atorvastatin 40 MG tablet  Commonly known as: LIPITOR  Take 1 tablet (40 mg total) by mouth once daily.     b complex vitamins capsule  Take 1 capsule by mouth once daily.     colchicine 0.6 mg tablet  Commonly known as: COLCRYS  For gout attack: Take TWO tablets by mouth, then, 2 hours later, take ONE additional tablet. Then take 1 tablet twice daily only if still needed for gout pain.     DULoxetine 60 MG capsule  Commonly known as: CYMBALTA  Take 2 capsules (120 mg total) by mouth once daily.     ferrous sulfate 142 mg (45 mg iron) Tbsr  Commonly known as: SLOW RELEASE IRON  Take by mouth.     furosemide 40 MG tablet  Commonly known as: LASIX  Take 1 tablet (40 mg total) by mouth 2 (two) times daily.     mpnsialb-bgka-tic3-C-radha-bosw 750 mg-644 mg- 30 mg-1 mg Tab  Take 1 tablet by mouth once daily.     lisinopriL 40 MG tablet  Commonly known as: PRINIVIL,ZESTRIL  Take 1 tablet (40 mg total) by mouth once daily.     magnesium oxide 400 mg (241.3 mg magnesium) tablet  Commonly known as: MAG-OX  Take 400 mg by mouth once daily.     MEGARED OMEGA-3 KRILL OIL ORAL     MELATONIN ORAL  Take 1-2 tablets by mouth nightly as needed (Sleep).      methylPREDNISolone 4 mg tablet  Commonly known as: MEDROL DOSEPACK  use as directed on pack     metoprolol succinate 50 MG 24 hr tablet  Commonly known as: TOPROL-XL  Take 1 tablet (50 mg total) by mouth 2 (two) times daily.     multivitamin per tablet  Commonly known as: THERAGRAN  Take 1 tablet by mouth once daily.     NIFEdipine 90 MG (OSM) 24 hr tablet  Commonly known as: PROCARDIA-XL  Take 1 tablet (90 mg total) by mouth once daily.     omeprazole 20 MG capsule  Commonly known as: PRILOSEC  TAKE 1 CAPSULE BY MOUTH ONCE DAILY     potassium chloride SA 20 MEQ tablet  Commonly known as: K-DUR,KLOR-CON  Take 1 tablet (20 mEq total) by mouth once daily.     predniSONE 10 MG tablet  Commonly known as: DELTASONE  take 1 tablet by mouth daily     propafenone 225 MG Tab  Commonly known as: RYTHMOL  Take 1 tablet (225 mg total) by mouth every 8 (eight) hours.     traZODone 100 MG tablet  Commonly known as: DESYREL  Take 1 tablet (100 mg total) by mouth nightly as needed for Insomnia.            Time spent on the discharge of patient: 30 minutes    Geovanna Garcia MD  Cardiac Electrophysiology  [facility] - Cardiology Stepdown

## 2022-01-25 NOTE — PLAN OF CARE
AAOx4.  No SOB or other distress.  No complaints of pain or otherwise at this time.  Free of falls, traumas, and injuries.  Vital signs stable.  Pressure dressing and suture removed to right groin; no complications noted; no bleeding or signs of hematoma; pedal pulses palpable. R neck incision CDI with no complications noted. Bedrest maintained until 1700. Plan of care reviewed with patient.  Handoff given to night nurse.

## 2022-01-26 ENCOUNTER — TELEPHONE (OUTPATIENT)
Dept: ELECTROPHYSIOLOGY | Facility: CLINIC | Age: 57
End: 2022-01-26
Payer: COMMERCIAL

## 2022-01-26 ENCOUNTER — PATIENT MESSAGE (OUTPATIENT)
Dept: ELECTROPHYSIOLOGY | Facility: CLINIC | Age: 57
End: 2022-01-26
Payer: COMMERCIAL

## 2022-01-26 NOTE — TELEPHONE ENCOUNTER
Spoke to: Vicky Alvarado    Does the patient have any concerns, questions about post op instructions?no she states she is doing well.  Denies CP or SOB, HR feels regular rate and rhythm.    Does the patient have any concerns, questions about wound site? NO she said both sites are soft.   Will remove dressings today with shower.   Right neck and Right groin sites free of swelling or hematoma.       Has the patient resumed medications and/or picked up new prescriptions ordered at discharge? Yes taking Eliquis BID, Rythmol tid and her other usual medication.   She had a question about a prescription she was given for Protonix.  Forwarded a message to Dr. Hawley's nurse.      Does the patient have any other questions regarding their procedure? NO    Patient verbalized understanding of her instructions and had no further questions or concerns.

## 2022-01-27 ENCOUNTER — PATIENT MESSAGE (OUTPATIENT)
Dept: ELECTROPHYSIOLOGY | Facility: CLINIC | Age: 57
End: 2022-01-27
Payer: COMMERCIAL

## 2022-01-27 LAB
HCO3 UR-SCNC: 26.3 MMOL/L (ref 24–28)
HCT VFR BLD CALC: 36 %PCV (ref 36–54)
PCO2 BLDA: 41.2 MMHG (ref 35–45)
PH SMN: 7.41 [PH] (ref 7.35–7.45)
PO2 BLDA: 150 MMHG (ref 80–100)
POC ACTIVATED CLOTTING TIME K: 148 SEC (ref 74–137)
POC ACTIVATED CLOTTING TIME K: 160 SEC (ref 74–137)
POC ACTIVATED CLOTTING TIME K: 273 SEC (ref 74–137)
POC ACTIVATED CLOTTING TIME K: 321 SEC (ref 74–137)
POC ACTIVATED CLOTTING TIME K: 333 SEC (ref 74–137)
POC ACTIVATED CLOTTING TIME K: 350 SEC (ref 74–137)
POC ACTIVATED CLOTTING TIME K: 350 SEC (ref 74–137)
POC BE: 2 MMOL/L
POC IONIZED CALCIUM: 1.1 MMOL/L (ref 1.06–1.42)
POC SATURATED O2: 99 % (ref 95–100)
POC TCO2: 27 MMOL/L (ref 23–27)
POTASSIUM BLD-SCNC: 4.6 MMOL/L (ref 3.5–5.1)
SAMPLE: ABNORMAL
SODIUM BLD-SCNC: 135 MMOL/L (ref 136–145)

## 2022-01-31 ENCOUNTER — PATIENT MESSAGE (OUTPATIENT)
Dept: CARDIOLOGY | Facility: CLINIC | Age: 57
End: 2022-01-31
Payer: COMMERCIAL

## 2022-01-31 DIAGNOSIS — I48.0 PAF (PAROXYSMAL ATRIAL FIBRILLATION): Primary | ICD-10-CM

## 2022-02-03 ENCOUNTER — TELEPHONE (OUTPATIENT)
Dept: ELECTROPHYSIOLOGY | Facility: CLINIC | Age: 57
End: 2022-02-03
Payer: COMMERCIAL

## 2022-02-04 ENCOUNTER — TELEPHONE (OUTPATIENT)
Dept: ELECTROPHYSIOLOGY | Facility: CLINIC | Age: 57
End: 2022-02-04
Payer: COMMERCIAL

## 2022-02-04 ENCOUNTER — PATIENT MESSAGE (OUTPATIENT)
Dept: ELECTROPHYSIOLOGY | Facility: CLINIC | Age: 57
End: 2022-02-04
Payer: COMMERCIAL

## 2022-02-08 NOTE — PROGRESS NOTES
Subjective:      Patient ID: Vicky Alvarado is a 56 y.o. female.    Chief Complaint: Foot Pain (Left foot pain)    Vicky is a 56 y.o. female who presents to the podiatry clinic  with complaint of  right foot pain. Onset of the symptoms was several days ago. Precipitating event: none known. Current symptoms include: ability to bear weight, but with some pain. Aggravating factors: any weight bearing. Symptoms have gradually worsened. Patient has had prior foot problems. Evaluation to date: none. Treatment to date: none and steroids, which helped on previous visit. . Patients rates pain 7/10 on pain scale.        Review of Systems   Constitutional: Negative for chills, fever and malaise/fatigue.   HENT: Negative for hearing loss.    Cardiovascular: Negative for claudication.   Respiratory: Negative for shortness of breath.    Skin: Negative for flushing and rash.   Musculoskeletal: Positive for gout, joint pain and joint swelling. Negative for myalgias.   Neurological: Negative for loss of balance, numbness, paresthesias and sensory change.   Psychiatric/Behavioral: Negative for altered mental status.           Objective:      Physical Exam  Vitals reviewed.   Cardiovascular:      Pulses:           Dorsalis pedis pulses are 2+ on the right side and 2+ on the left side.        Posterior tibial pulses are 2+ on the right side and 2+ on the left side.      Comments: No edema noted b/L  Musculoskeletal:      Comments:   No reproducible pain on today's visit   Feet:      Right foot:      Protective Sensation: 5 sites tested. 5 sites sensed.      Left foot:      Protective Sensation: 5 sites tested. 5 sites sensed.   Skin:     General: Skin is warm.      Capillary Refill: Capillary refill takes 2 to 3 seconds.      Comments: Increase in temp noted to right 1st MPJ with pain    Neurological:      Mental Status: She is alert.      Comments: Gross sensation intact b/L               Assessment:       Encounter Diagnosis    Name Primary?    Left foot pain Yes         Plan:       Vicky was seen today for foot pain.    Diagnoses and all orders for this visit:    Left foot pain      I counseled the patient on her conditions, their implications and medical management.      Pt advised to f/u with rheumatology for chronic joint pain.   Pt advised on RICE and OTC NSAIDs for associated pain.   Call or return to clinic prn if these symptoms worsen or fail to improve as anticipated.    .

## 2022-02-09 DIAGNOSIS — M10.9 ACUTE GOUT OF FOOT, UNSPECIFIED CAUSE, UNSPECIFIED LATERALITY: ICD-10-CM

## 2022-02-09 RX ORDER — PREDNISONE 10 MG/1
TABLET ORAL DAILY
Qty: 10 TABLET | Refills: 0 | Status: ON HOLD | OUTPATIENT
Start: 2022-02-09 | End: 2022-02-27 | Stop reason: HOSPADM

## 2022-02-11 ENCOUNTER — PATIENT MESSAGE (OUTPATIENT)
Dept: ELECTROPHYSIOLOGY | Facility: CLINIC | Age: 57
End: 2022-02-11
Payer: COMMERCIAL

## 2022-02-18 ENCOUNTER — HOSPITAL ENCOUNTER (INPATIENT)
Facility: HOSPITAL | Age: 57
LOS: 9 days | Discharge: HOME | DRG: 744 | End: 2022-02-27
Attending: EMERGENCY MEDICINE | Admitting: INTERNAL MEDICINE
Payer: COMMERCIAL

## 2022-02-18 DIAGNOSIS — R42 DIZZINESS: ICD-10-CM

## 2022-02-18 DIAGNOSIS — I10 ESSENTIAL HYPERTENSION: Chronic | ICD-10-CM

## 2022-02-18 DIAGNOSIS — I48.0 PAF (PAROXYSMAL ATRIAL FIBRILLATION): ICD-10-CM

## 2022-02-18 DIAGNOSIS — R57.8 HEMORRHAGIC SHOCK: ICD-10-CM

## 2022-02-18 DIAGNOSIS — N93.9 VAGINA BLEEDING: Primary | ICD-10-CM

## 2022-02-18 DIAGNOSIS — N93.9 VAGINAL BLEEDING: ICD-10-CM

## 2022-02-18 PROBLEM — N17.9 AKI (ACUTE KIDNEY INJURY): Status: ACTIVE | Noted: 2022-02-18

## 2022-02-18 LAB
ABO + RH BLD: NORMAL
ALBUMIN SERPL BCP-MCNC: 3 G/DL (ref 3.5–5.2)
ALBUMIN SERPL BCP-MCNC: 3.4 G/DL (ref 3.5–5.2)
ALP SERPL-CCNC: 144 U/L (ref 55–135)
ALP SERPL-CCNC: 173 U/L (ref 55–135)
ALT SERPL W/O P-5'-P-CCNC: 22 U/L (ref 10–44)
ALT SERPL W/O P-5'-P-CCNC: 29 U/L (ref 10–44)
ANION GAP SERPL CALC-SCNC: 13 MMOL/L (ref 8–16)
ANION GAP SERPL CALC-SCNC: 14 MMOL/L (ref 8–16)
AST SERPL-CCNC: 135 U/L (ref 10–40)
AST SERPL-CCNC: 171 U/L (ref 10–40)
BACTERIA #/AREA URNS AUTO: ABNORMAL /HPF
BASOPHILS # BLD AUTO: 0.1 K/UL (ref 0–0.2)
BASOPHILS # BLD AUTO: 0.1 K/UL (ref 0–0.2)
BASOPHILS NFR BLD: 0.8 % (ref 0–1.9)
BASOPHILS NFR BLD: 0.9 % (ref 0–1.9)
BILIRUB SERPL-MCNC: 0.9 MG/DL (ref 0.1–1)
BILIRUB SERPL-MCNC: 1.4 MG/DL (ref 0.1–1)
BILIRUB UR QL STRIP: NEGATIVE
BLD GP AB SCN CELLS X3 SERPL QL: NORMAL
BLD PROD TYP BPU: NORMAL
BLD PROD TYP BPU: NORMAL
BLOOD UNIT EXPIRATION DATE: NORMAL
BLOOD UNIT EXPIRATION DATE: NORMAL
BLOOD UNIT TYPE CODE: 9500
BLOOD UNIT TYPE CODE: 9500
BLOOD UNIT TYPE: NORMAL
BLOOD UNIT TYPE: NORMAL
BUN SERPL-MCNC: 12 MG/DL (ref 6–20)
BUN SERPL-MCNC: 13 MG/DL (ref 6–20)
CALCIUM SERPL-MCNC: 9.3 MG/DL (ref 8.7–10.5)
CALCIUM SERPL-MCNC: 9.9 MG/DL (ref 8.7–10.5)
CHLORIDE SERPL-SCNC: 101 MMOL/L (ref 95–110)
CHLORIDE SERPL-SCNC: 96 MMOL/L (ref 95–110)
CLARITY UR REFRACT.AUTO: ABNORMAL
CO2 SERPL-SCNC: 23 MMOL/L (ref 23–29)
CO2 SERPL-SCNC: 25 MMOL/L (ref 23–29)
CODING SYSTEM: NORMAL
CODING SYSTEM: NORMAL
COLOR UR AUTO: ABNORMAL
CREAT SERPL-MCNC: 1.5 MG/DL (ref 0.5–1.4)
CREAT SERPL-MCNC: 1.9 MG/DL (ref 0.5–1.4)
CTP QC/QA: YES
DIFFERENTIAL METHOD: ABNORMAL
DIFFERENTIAL METHOD: ABNORMAL
DISPENSE STATUS: NORMAL
DISPENSE STATUS: NORMAL
EOSINOPHIL # BLD AUTO: 0.1 K/UL (ref 0–0.5)
EOSINOPHIL # BLD AUTO: 0.2 K/UL (ref 0–0.5)
EOSINOPHIL NFR BLD: 1 % (ref 0–8)
EOSINOPHIL NFR BLD: 1.7 % (ref 0–8)
ERYTHROCYTE [DISTWIDTH] IN BLOOD BY AUTOMATED COUNT: 14.2 % (ref 11.5–14.5)
ERYTHROCYTE [DISTWIDTH] IN BLOOD BY AUTOMATED COUNT: 14.8 % (ref 11.5–14.5)
EST. GFR  (AFRICAN AMERICAN): 33.3 ML/MIN/1.73 M^2
EST. GFR  (AFRICAN AMERICAN): 44.3 ML/MIN/1.73 M^2
EST. GFR  (NON AFRICAN AMERICAN): 28.9 ML/MIN/1.73 M^2
EST. GFR  (NON AFRICAN AMERICAN): 38.4 ML/MIN/1.73 M^2
ESTIMATED AVG GLUCOSE: 120 MG/DL (ref 68–131)
GLUCOSE SERPL-MCNC: 107 MG/DL (ref 70–110)
GLUCOSE SERPL-MCNC: 127 MG/DL (ref 70–110)
GLUCOSE UR QL STRIP: NEGATIVE
HBA1C MFR BLD: 5.8 % (ref 4–5.6)
HCT VFR BLD AUTO: 30 % (ref 37–48.5)
HCT VFR BLD AUTO: 33.2 % (ref 37–48.5)
HGB BLD-MCNC: 10.7 G/DL (ref 12–16)
HGB BLD-MCNC: 9.7 G/DL (ref 12–16)
HGB BLD-MCNC: 9.9 G/DL (ref 12–16)
HGB UR QL STRIP: ABNORMAL
HYALINE CASTS UR QL AUTO: 0 /LPF
IMM GRANULOCYTES # BLD AUTO: 0.05 K/UL (ref 0–0.04)
IMM GRANULOCYTES # BLD AUTO: 0.05 K/UL (ref 0–0.04)
IMM GRANULOCYTES NFR BLD AUTO: 0.4 % (ref 0–0.5)
IMM GRANULOCYTES NFR BLD AUTO: 0.4 % (ref 0–0.5)
INR PPP: 1.1 (ref 0.8–1.2)
KETONES UR QL STRIP: NEGATIVE
LEUKOCYTE ESTERASE UR QL STRIP: NEGATIVE
LYMPHOCYTES # BLD AUTO: 1.6 K/UL (ref 1–4.8)
LYMPHOCYTES # BLD AUTO: 1.9 K/UL (ref 1–4.8)
LYMPHOCYTES NFR BLD: 13.6 % (ref 18–48)
LYMPHOCYTES NFR BLD: 16.5 % (ref 18–48)
MCH RBC QN AUTO: 32.6 PG (ref 27–31)
MCH RBC QN AUTO: 33.3 PG (ref 27–31)
MCHC RBC AUTO-ENTMCNC: 32.2 G/DL (ref 32–36)
MCHC RBC AUTO-ENTMCNC: 32.3 G/DL (ref 32–36)
MCV RBC AUTO: 101 FL (ref 82–98)
MCV RBC AUTO: 103 FL (ref 82–98)
MICROSCOPIC COMMENT: ABNORMAL
MONOCYTES # BLD AUTO: 0.8 K/UL (ref 0.3–1)
MONOCYTES # BLD AUTO: 1.1 K/UL (ref 0.3–1)
MONOCYTES NFR BLD: 6.9 % (ref 4–15)
MONOCYTES NFR BLD: 9.1 % (ref 4–15)
NEUTROPHILS # BLD AUTO: 8.3 K/UL (ref 1.8–7.7)
NEUTROPHILS # BLD AUTO: 9.2 K/UL (ref 1.8–7.7)
NEUTROPHILS NFR BLD: 71.4 % (ref 38–73)
NEUTROPHILS NFR BLD: 77.3 % (ref 38–73)
NITRITE UR QL STRIP: NEGATIVE
NRBC BLD-RTO: 0 /100 WBC
NRBC BLD-RTO: 0 /100 WBC
PH UR STRIP: 5 [PH] (ref 5–8)
PLATELET # BLD AUTO: 177 K/UL (ref 150–450)
PLATELET # BLD AUTO: 228 K/UL (ref 150–450)
PMV BLD AUTO: 10.8 FL (ref 9.2–12.9)
PMV BLD AUTO: 10.9 FL (ref 9.2–12.9)
POTASSIUM SERPL-SCNC: 4.7 MMOL/L (ref 3.5–5.1)
POTASSIUM SERPL-SCNC: 5.4 MMOL/L (ref 3.5–5.1)
PROT SERPL-MCNC: 6.6 G/DL (ref 6–8.4)
PROT SERPL-MCNC: 8 G/DL (ref 6–8.4)
PROT UR QL STRIP: ABNORMAL
PROTHROMBIN TIME: 11.6 SEC (ref 9–12.5)
RBC # BLD AUTO: 2.91 M/UL (ref 4–5.4)
RBC # BLD AUTO: 3.28 M/UL (ref 4–5.4)
RBC #/AREA URNS AUTO: >100 /HPF (ref 0–4)
SARS-COV-2 RDRP RESP QL NAA+PROBE: NEGATIVE
SODIUM SERPL-SCNC: 135 MMOL/L (ref 136–145)
SODIUM SERPL-SCNC: 137 MMOL/L (ref 136–145)
SP GR UR STRIP: 1.02 (ref 1–1.03)
SQUAMOUS #/AREA URNS AUTO: 2 /HPF
TRANS ERYTHROCYTES VOL PATIENT: NORMAL ML
TRANS ERYTHROCYTES VOL PATIENT: NORMAL ML
URN SPEC COLLECT METH UR: ABNORMAL
WBC # BLD AUTO: 11.57 K/UL (ref 3.9–12.7)
WBC # BLD AUTO: 11.9 K/UL (ref 3.9–12.7)
WBC #/AREA URNS AUTO: >100 /HPF (ref 0–5)

## 2022-02-18 PROCEDURE — 99291 PR CRITICAL CARE, E/M 30-74 MINUTES: ICD-10-PCS | Mod: CS,,, | Performed by: EMERGENCY MEDICINE

## 2022-02-18 PROCEDURE — 93005 ELECTROCARDIOGRAM TRACING: CPT

## 2022-02-18 PROCEDURE — 80053 COMPREHEN METABOLIC PANEL: CPT | Performed by: EMERGENCY MEDICINE

## 2022-02-18 PROCEDURE — 86920 COMPATIBILITY TEST SPIN: CPT | Performed by: NURSE PRACTITIONER

## 2022-02-18 PROCEDURE — P9021 RED BLOOD CELLS UNIT: HCPCS | Performed by: EMERGENCY MEDICINE

## 2022-02-18 PROCEDURE — U0002 COVID-19 LAB TEST NON-CDC: HCPCS | Performed by: EMERGENCY MEDICINE

## 2022-02-18 PROCEDURE — 36430 TRANSFUSION BLD/BLD COMPNT: CPT

## 2022-02-18 PROCEDURE — 93010 EKG 12-LEAD: ICD-10-PCS | Mod: ,,, | Performed by: INTERNAL MEDICINE

## 2022-02-18 PROCEDURE — 86920 COMPATIBILITY TEST SPIN: CPT | Performed by: EMERGENCY MEDICINE

## 2022-02-18 PROCEDURE — 99233 PR SUBSEQUENT HOSPITAL CARE,LEVL III: ICD-10-PCS | Mod: ,,, | Performed by: OBSTETRICS & GYNECOLOGY

## 2022-02-18 PROCEDURE — 96374 THER/PROPH/DIAG INJ IV PUSH: CPT

## 2022-02-18 PROCEDURE — 86920 COMPATIBILITY TEST SPIN: CPT | Performed by: INTERNAL MEDICINE

## 2022-02-18 PROCEDURE — 99291 CRITICAL CARE FIRST HOUR: CPT | Mod: 25

## 2022-02-18 PROCEDURE — 99291 CRITICAL CARE FIRST HOUR: CPT | Mod: ,,, | Performed by: PHYSICIAN ASSISTANT

## 2022-02-18 PROCEDURE — 93010 ELECTROCARDIOGRAM REPORT: CPT | Mod: ,,, | Performed by: INTERNAL MEDICINE

## 2022-02-18 PROCEDURE — 85610 PROTHROMBIN TIME: CPT | Performed by: EMERGENCY MEDICINE

## 2022-02-18 PROCEDURE — 63600175 PHARM REV CODE 636 W HCPCS: Performed by: EMERGENCY MEDICINE

## 2022-02-18 PROCEDURE — 25000003 PHARM REV CODE 250: Performed by: EMERGENCY MEDICINE

## 2022-02-18 PROCEDURE — 83036 HEMOGLOBIN GLYCOSYLATED A1C: CPT | Performed by: PHYSICIAN ASSISTANT

## 2022-02-18 PROCEDURE — 87086 URINE CULTURE/COLONY COUNT: CPT | Performed by: EMERGENCY MEDICINE

## 2022-02-18 PROCEDURE — 85025 COMPLETE CBC W/AUTO DIFF WBC: CPT | Performed by: EMERGENCY MEDICINE

## 2022-02-18 PROCEDURE — 99291 CRITICAL CARE FIRST HOUR: CPT | Mod: CS,,, | Performed by: EMERGENCY MEDICINE

## 2022-02-18 PROCEDURE — 80053 COMPREHEN METABOLIC PANEL: CPT | Mod: 91 | Performed by: GENERAL ACUTE CARE HOSPITAL

## 2022-02-18 PROCEDURE — 99291 PR CRITICAL CARE, E/M 30-74 MINUTES: ICD-10-PCS | Mod: ,,, | Performed by: PHYSICIAN ASSISTANT

## 2022-02-18 PROCEDURE — 96361 HYDRATE IV INFUSION ADD-ON: CPT

## 2022-02-18 PROCEDURE — 86850 RBC ANTIBODY SCREEN: CPT | Performed by: EMERGENCY MEDICINE

## 2022-02-18 PROCEDURE — 85025 COMPLETE CBC W/AUTO DIFF WBC: CPT | Mod: 91 | Performed by: PHYSICIAN ASSISTANT

## 2022-02-18 PROCEDURE — 99233 SBSQ HOSP IP/OBS HIGH 50: CPT | Mod: ,,, | Performed by: OBSTETRICS & GYNECOLOGY

## 2022-02-18 PROCEDURE — 81001 URINALYSIS AUTO W/SCOPE: CPT | Performed by: EMERGENCY MEDICINE

## 2022-02-18 PROCEDURE — 12000002 HC ACUTE/MED SURGE SEMI-PRIVATE ROOM

## 2022-02-18 PROCEDURE — 85018 HEMOGLOBIN: CPT | Performed by: EMERGENCY MEDICINE

## 2022-02-18 PROCEDURE — 25000003 PHARM REV CODE 250: Performed by: INTERNAL MEDICINE

## 2022-02-18 PROCEDURE — 25000003 PHARM REV CODE 250: Performed by: PHYSICIAN ASSISTANT

## 2022-02-18 RX ORDER — ATORVASTATIN CALCIUM 20 MG/1
40 TABLET, FILM COATED ORAL DAILY
Status: DISCONTINUED | OUTPATIENT
Start: 2022-02-19 | End: 2022-02-27 | Stop reason: HOSPADM

## 2022-02-18 RX ORDER — ONDANSETRON 2 MG/ML
4 INJECTION INTRAMUSCULAR; INTRAVENOUS
Status: COMPLETED | OUTPATIENT
Start: 2022-02-18 | End: 2022-02-18

## 2022-02-18 RX ORDER — HYDROCODONE BITARTRATE AND ACETAMINOPHEN 500; 5 MG/1; MG/1
TABLET ORAL
Status: DISCONTINUED | OUTPATIENT
Start: 2022-02-18 | End: 2022-02-21

## 2022-02-18 RX ORDER — ACETAMINOPHEN 500 MG
TABLET ORAL
COMMUNITY
End: 2023-02-20

## 2022-02-18 RX ORDER — DULOXETIN HYDROCHLORIDE 60 MG/1
120 CAPSULE, DELAYED RELEASE ORAL DAILY
Status: DISCONTINUED | OUTPATIENT
Start: 2022-02-19 | End: 2022-02-27 | Stop reason: HOSPADM

## 2022-02-18 RX ORDER — NOREPINEPHRINE BITARTRATE/D5W 4MG/250ML
0-3 PLASTIC BAG, INJECTION (ML) INTRAVENOUS CONTINUOUS
Status: DISCONTINUED | OUTPATIENT
Start: 2022-02-18 | End: 2022-02-21

## 2022-02-18 RX ORDER — POLYETHYLENE GLYCOL 3350 17 G/17G
POWDER, FOR SOLUTION ORAL
COMMUNITY
End: 2022-08-05 | Stop reason: CLARIF

## 2022-02-18 RX ORDER — PANTOPRAZOLE SODIUM 40 MG/1
40 TABLET, DELAYED RELEASE ORAL DAILY
Refills: 3 | Status: DISCONTINUED | OUTPATIENT
Start: 2022-02-19 | End: 2022-02-27 | Stop reason: HOSPADM

## 2022-02-18 RX ORDER — ACETAMINOPHEN 325 MG/1
650 TABLET ORAL EVERY 6 HOURS PRN
Status: DISCONTINUED | OUTPATIENT
Start: 2022-02-18 | End: 2022-02-27 | Stop reason: HOSPADM

## 2022-02-18 RX ADMIN — ACETAMINOPHEN 650 MG: 325 TABLET ORAL at 05:02

## 2022-02-18 RX ADMIN — ONDANSETRON 4 MG: 2 INJECTION INTRAMUSCULAR; INTRAVENOUS at 02:02

## 2022-02-18 RX ADMIN — NOREPINEPHRINE BITARTRATE 0.02 MCG/KG/MIN: 4 INJECTION, SOLUTION INTRAVENOUS at 10:02

## 2022-02-18 RX ADMIN — SODIUM CHLORIDE, SODIUM LACTATE, POTASSIUM CHLORIDE, AND CALCIUM CHLORIDE 1000 ML: .6; .31; .03; .02 INJECTION, SOLUTION INTRAVENOUS at 12:02

## 2022-02-18 RX ADMIN — SODIUM CHLORIDE 25 MG: 9 INJECTION, SOLUTION INTRAVENOUS at 04:02

## 2022-02-18 NOTE — ED PROVIDER NOTES
Encounter Date: 2/18/2022    SCRIBE #1 NOTE: I, Izzy Echevarria, am scribing for, and in the presence of,  Marian Benitez MD. I have scribed the following portions of the note - Other sections scribed: HPI ROS.       History     Chief Complaint   Patient presents with    Vaginal Bleeding     Arrives with c/o vaginal bleeding and abdominal pain that started yesterday, on blood thinners, pt reports large clots coming out, + SOB + dizziness, pt also reports cardiac ablation 2 weeks for A-fib      Time patient was seen by the provider: 9:29 AM      The patient is a 56 y.o. female with past medical history of Afib, anti-coagulant long-term use, HTN, CHF, who presents to the ED with a complaint of vaginal bleeding and abdominal pain onset yesterday. Patient reports she is having significant vaginal bleeding with large blood clots since yesterday. States the bleeding has gradually worsened and clots are as large as an orange. The bleeding is constant. She is having to wear two large menstrual pads and changing them frequently. Standing up causes her to bleed more. Patient mentions that she has not menstruated in the last 5 years. The abdominal pain started two days ago as cramping sensation but worsened yesterday. She also notes she is feeling fatigued and dizzy today. Denies hematemesis, hemoptysis, or blood in the stool. The patient is s/p cardiac ablation 2 weeks ago for Afib.    The history is provided by the patient and medical records. No  was used.     Review of patient's allergies indicates:   Allergen Reactions    Flecainide Shortness Of Breath and Swelling     Past Medical History:   Diagnosis Date    Anticoagulant long-term use     Arthritis     Atrial fibrillation     cardioversion    Atrial fibrillation with RVR     Avascular necrosis     L hand    CHF (congestive heart failure)     Depression     Encounter for blood transfusion 07/22/2020    GERD (gastroesophageal reflux  disease)     Hx of psychiatric care     Hypertension     Obese     Psychiatric problem     Sleep apnea      Past Surgical History:   Procedure Laterality Date    ABLATION OF ARRHYTHMOGENIC FOCUS FOR ATRIAL FIBRILLATION N/A 7/20/2020    Procedure: Ablation atrial fibrillation;  Surgeon: Gabriel Hawley MD;  Location: [facility] EP LAB;  Service: Cardiology;  Laterality: N/A;  afib, PVI, RFA, REENA, SHADY, anes, MB, 3 Prep    ABLATION OF ARRHYTHMOGENIC FOCUS FOR ATRIAL FIBRILLATION N/A 1/24/2022    Procedure: Ablation atrial fibrillation;  Surgeon: Gabriel Hawley MD;  Location: [facility] EP LAB;  Service: Cardiology;  Laterality: N/A;  afib/afl, PVI (re-do)/CTI, RFA, REENA (cx if SR), SHADY, anes, MB, 3 Prep    APPLICATION OF WOUND VACUUM-ASSISTED CLOSURE DEVICE Left 8/3/2020    Procedure: APPLICATION, WOUND VAC;  Surgeon: SEMAJ Márquez III, MD;  Location: [facility] OR 2ND FLR;  Service: Peripheral Vascular;  Laterality: Left;    APPLICATION OF WOUND VACUUM-ASSISTED CLOSURE DEVICE Left 8/6/2020    Procedure: APPLICATION, WOUND VAC;  Surgeon: SEMAJ Márquez III, MD;  [facility];  Service: Peripheral Vascular;  Laterality: Left;    CARPAL TUNNEL RELEASE Right 6/10/2020    Procedure: RELEASE, CARPAL TUNNEL - RIGHT;  Surgeon: Adelaida Hall MD;  [facility];  Service: Orthopedics;  Laterality: Right;  GENERAL AND REGIONAL    CARPAL TUNNEL RELEASE Left 5/5/2021    Procedure: RELEASE, CARPAL TUNNEL, LEFT;  Surgeon: Adelaida Hall MD;  Location: [facility] OR;  Service: Orthopedics;  Laterality: Left;  GENERAL & REGIONAL IN PACU    CLOSURE OF WOUND Left 8/6/2020    Procedure: CLOSURE, WOUND;  Surgeon: SEMAJ Márquez III, MD;  Location: [facility] OR [facility] FLR;  Service: Peripheral Vascular;  Laterality: Left;  Complex    COLONOSCOPY N/A 8/17/2021    Procedure: COLONOSCOPY Suprep;  Surgeon: Loco Deluca MD;  Location: [facility] ENDO;  Service: Endoscopy;  Laterality: N/A;     ESOPHAGOGASTRODUODENOSCOPY N/A 2021    Procedure: EGD (ESOPHAGOGASTRODUODENOSCOPY);  Surgeon: Loco Deluca MD;  Location: [facility];  Service: Endoscopy;  Laterality: N/A;  Patient is schedule to have her Covid test on 2021 at the [facility] @ 9:30am. [place].    EXPLORATION OF FEMORAL ARTERY Left 2020    Procedure: EXPLORATION, ARTERY, FEMORAL;  Surgeon: SEMAJ Márquez III, MD;  Location: [facility] OR [address] [place] [address];  Service: Peripheral Vascular;  Laterality: Left;  1. Urgent Direct repair, L SFA branch laceration    FOOT SURGERY      TREATMENT OF CARDIAC ARRHYTHMIA N/A 2020    Procedure: CARDIOVERSION;  Surgeon: Gabriel Hawley MD;  Location: [facility] EP LAB;  Service: Cardiology;  Laterality: N/A;  af, demi, dccv, anes, mb, 345    TREATMENT OF CARDIAC ARRHYTHMIA  2022    Procedure: Cardioversion or Defibrillation;  Surgeon: Gabriel Hawley MD;  Location: [facility] EP LAB;  Service: Cardiology;;    WOUND DEBRIDEMENT Left 2020    Procedure: DEBRIDEMENT, WOUND;  Surgeon: SEMAJ Márquez III, MD;  Location: [facility] OR [address] [place] [address];  Service: Peripheral Vascular;  Laterality: Left;     Family History   Problem Relation Age of Onset    Cataracts Mother     Hypertension Mother     Thyroid disease Mother     Diabetes Father     Hypertension Father     Hypertension Sister     Hypertension Brother     Amblyopia Neg Hx     Blindness Neg Hx     Cancer Neg Hx     Glaucoma Neg Hx     Macular degeneration Neg Hx     Retinal detachment Neg Hx     Strabismus Neg Hx     Stroke Neg Hx      Social History     Tobacco Use    Smoking status: Former Smoker     Types: Cigarettes     Quit date: 2019     Years since quittin.6    Smokeless tobacco: Never Used   Substance Use Topics    Alcohol use: No    Drug use: No     Review of Systems   Constitutional: Positive for fatigue. Negative for chills, diaphoresis and fever.   HENT: Negative for congestion, rhinorrhea and sore throat.     Eyes: Negative for visual disturbance.   Respiratory: Negative for cough, chest tightness and shortness of breath.    Cardiovascular: Negative for chest pain.   Gastrointestinal: Positive for abdominal pain. Negative for blood in stool, constipation, diarrhea and vomiting.   Genitourinary: Positive for vaginal bleeding. Negative for dysuria, hematuria and urgency.   Musculoskeletal: Negative for back pain.   Skin: Negative for rash.   Neurological: Positive for dizziness. Negative for seizures and syncope.   Hematological: Does not bruise/bleed easily.   Psychiatric/Behavioral: Negative for agitation and hallucinations.       Physical Exam     Initial Vitals [02/18/22 0852]   BP Pulse Resp Temp SpO2   (!) 119/58 76 20 97.7 °F (36.5 °C) 98 %      MAP       --         Physical Exam  Gen: AxOx4, NAD, appears stated age, well appearing  Eye: EOMI, no scleral icterus, no periorbital edema or ecchymosis  Head: Normocephalic, atraumatic, no lesions, scalp grossly normal  ENT: neck supple, no stridor, no masses, no abnormal phonation or drooling  CVS: warm and well perfused, cap refill <2 seconds, no extremity pallor  PULM: normal work of breathing, no stridor, equal chest rise, no peripheral cyanosis  ABD: scaphoid, nondistended  Pelvic: signifcant bleeding from cervical os, with multiple clots evacuated but no cessation; not arterial, no lesions noted.   Ext: no rash, no deformities, moving all joints with normal ROM  Neuro: BRADLEY, gait intact, face grossly symmetric  Psych: well groomed, mood and affect congruent, makes good eye contact, cooperative    ED Course   Procedures  Labs Reviewed   CBC W/ AUTO DIFFERENTIAL - Abnormal; Notable for the following components:       Result Value    RBC 3.28 (*)     Hemoglobin 10.7 (*)     Hematocrit 33.2 (*)      (*)     MCH 32.6 (*)     Gran # (ANC) 9.2 (*)     Immature Grans (Abs) 0.05 (*)     Gran % 77.3 (*)     Lymph % 13.6 (*)     All other components within normal  limits   COMPREHENSIVE METABOLIC PANEL - Abnormal; Notable for the following components:    Sodium 135 (*)     Potassium 5.4 (*)     Glucose 127 (*)     Creatinine 1.5 (*)     Albumin 3.4 (*)     Alkaline Phosphatase 173 (*)      (*)     eGFR if  44.3 (*)     eGFR if non  38.4 (*)     All other components within normal limits   URINALYSIS, REFLEX TO URINE CULTURE - Abnormal; Notable for the following components:    Color, UA Red (*)     Appearance, UA Cloudy (*)     Protein, UA 2+ (*)     Occult Blood UA 2+ (*)     All other components within normal limits    Narrative:     Specimen Source->Urine   URINALYSIS MICROSCOPIC - Abnormal; Notable for the following components:    RBC, UA >100 (*)     WBC, UA >100 (*)     All other components within normal limits    Narrative:     Specimen Source->Urine   HEMOGLOBIN - Abnormal; Notable for the following components:    Hemoglobin 9.9 (*)     All other components within normal limits   CULTURE, URINE   PROTIME-INR   SARS-COV-2 RDRP GENE   TYPE & SCREEN   TYPE & SCREEN   PREPARE RBC SOFT        ECG Results          EKG 12-lead (Final result)  Result time 02/18/22 14:31:29    Final result by Interface, [facility] (02/18/22 14:31:29)                 Narrative:    Test Reason : R42,    Vent. Rate : 074 BPM     Atrial Rate : 074 BPM     P-R Int : 160 ms          QRS Dur : 086 ms      QT Int : 398 ms       P-R-T Axes : 054 040 035 degrees     QTc Int : 441 ms    Normal sinus rhythm  Normal ECG  No previous ECGs available  Confirmed by TANYA THOMAS, Ramy ([id number]) on 2/18/2022 2:31:23 PM    Referred By: [email or web address]   SELF           Confirmed By:Ramy MARTÍNEZ MD                            Imaging Results           US Pelvis Comp with Transvag NON-OB (xpd (Final result)  Result time 02/18/22 12:58:12    Final result by Rajiv Fernadno MD (02/18/22 12:58:12)                 Impression:      This report was flagged in Epic as abnormal.    Endometrial  thickening, abnormal in this reportedly postmenopausal patient.  There is heterogeneous avascular material within the lower uterine segment extending to the cervical canal concerning for blood products.  Correlation is advised, consultation with OBGYN is recommended.    Uterine leiomyoma.      Electronically signed by: Rajiv Fernando MD  Date:    02/18/2022  Time:    12:58             Narrative:    EXAMINATION:  US PELVIS COMP WITH TRANSVAG NON-OB (XPD)    CLINICAL HISTORY:  post menopausal vaginal bleeding, on eliquis;    TECHNIQUE:  Transabdominal sonography of the pelvis was performed, followed by transvaginal sonography to better evaluate the uterus and ovaries.    COMPARISON:  None.    FINDINGS:  The uterus measures a proximally 11.0 x 8.6 x 6.8 cm.  The endometrial stripe is thickened measuring up to 1.7 cm.  There is heterogeneous avascular material within the lower uterine segment extending to the cervical canal suggesting blood products.  There is a partially calcified fibroid measuring approximately 2.9 x 2.3 x 3.1 cm.    The ovaries are not identified.  There is trace fluid in the pelvis.  There are a few prominent vessels within the left adnexa, correlation with any history of pelvic congestion syndrome.                                 Medications   0.9%  NaCl infusion (for blood administration) (has no administration in time range)   0.9%  NaCl infusion (for blood administration) (has no administration in time range)   conjugated estrogens (PREMARIN) 25 mg in sodium chloride 0.9% 50 mL IVPB (25 mg Intravenous Not Given 2/18/22 1500)   lactated ringers bolus 1,000 mL (0 mLs Intravenous Stopped 2/18/22 1439)   ondansetron injection 4 mg (4 mg Intravenous Given 2/18/22 1404)     Medical Decision Making:   History:   Old Medical Records: I decided to obtain old medical records.  Initial Assessment:   This is a 56-year-old woman with a history of AFib on Eliquis who presents with acute onset significant  vaginal bleeding, with multiple clots on exam.  Her initial vital signs are reassuring, plan for pelvic ultrasound to evaluate for etiology, as well as CBC, CMP, coags and type and screen, given that she is therapeutically anticoagulated I anticipate a need for clinical observation.  Clinical Tests:   Lab Tests: Ordered and Reviewed  Radiological Study: Ordered and Reviewed  Medical Tests: Ordered and Reviewed  ED Management:  Hemoglobin of 10 is consistent with prior baseline, though her most recent was 12. Will recheck in a few hours.  On reassessment, the patient did drop her pressure, and I did give her on crossmatched blood.  I discussed the patient emergently with gynecology and they recommended IV estrogens.  I discussed the patient with Cardiology and they recommended against reversal of Eliquis, her last dose was last night.  I also discussed the patient with Critical Care Medicine, who has accepted her for admission for close hemodynamic monitoring.  Other:   I have discussed this case with another health care provider.       <> Summary of the Discussion: Critical Care          Scribe Attestation:   Scribe #1: I performed the above scribed service and the documentation accurately describes the services I performed. I attest to the accuracy of the note.    Attending Attestation:         Attending Critical Care:   Critical Care Times:   Direct Patient Care (initial evaluation, reassessments, and time considering the case)................................................................30 minutes.   Additional History from reviewing old medical records or taking additional history from the family, EMS, PCP, etc.......................5 minutes.   Ordering, Reviewing, and Interpreting Diagnostic Studies...............................................................................................................5 minutes.    Documentation..................................................................................................................................................................................5 minutes.   Consultation with other Physicians. .................................................................................................................................................15 minutes.   ==============================================================  · Total Critical Care Time - exclusive of procedural time: 60 minutes.  ==============================================================  Critical care was necessary to treat or prevent imminent or life-threatening deterioration of the following conditions: nontraumatic shock and vaginal bleeding.   Critical care was time spent personally by me on the following activities: obtaining history from patient or relative, examination of patient, review of old charts, ordering lab, x-rays, and/or EKG, development of treatment plan with patient or relative, evaluation of patient's response to treatment, ordering and performing treatments and interventions, discussion with consultants, interpretation of cardiac measurements and re-evaluation of patient's conition.   Critical Care Condition: critical           ED Course as of 02/18/22 1505   Fri Feb 18, 2022   0944 EKG by my independent interpretation is normal sinus rhythm at a rate of 74, no obvious ischemic changes in comparison to prior from January [EB]      ED Course User Index  [EB] Marian Benitez MD             Clinical Impression:   Final diagnoses:  [R42] Dizziness  [R57.8] Hemorrhagic shock          ED Disposition Condition    Admit               Marian Benitez MD  02/18/22 150

## 2022-02-18 NOTE — ASSESSMENT & PLAN NOTE
Hx of afib s/p 2 cardioversions, most recent 2 weeks ago. Follows with Dr. Hawley with EP as an outpatient. Remains on elquis.     --NSR on admission  --holding metoprolol and propafenone due to shock. Restart when clinically appropriate  --hold anticoagulation until vaginal bleeding resolved

## 2022-02-18 NOTE — CONSULTS
Consult Note  Gynecology    Consult Requested By: Emergency Medicine  Reason for Consult: Postmenopausal Bleeding    SUBJECTIVE:     History of Present Illness:  Vicky Alvarado is a 56 y.o.  who presents with heavy postmenopausal bleeding. Reports onset of heavy vaginal bleeding , with passage of clots size of orange, and continued until presentation to [facility] ED. Patient reports previous episode of spotting s/p first cardiac ablation; however, denies previous similar incidence. Patient currently on eliquis (has been on for 2 years). Reports moderate lightheadedness and dizziness.     Patient describes history of heavy menses and menopause in . No episodes of bleeding since menopause. Denies history of abnormal paps; however, patient has not seen gynecologist in over 20+ years.     Surgical history consistent with bilateral ulnar release and ankle surgery.     Family history negative for ovarian, colon, endometrial cancer.     Review of patient's allergies indicates:   Allergen Reactions    Flecainide Shortness Of Breath and Swelling     Past Medical History:   Diagnosis Date    Anticoagulant long-term use     Arthritis     Atrial fibrillation     cardioversion    Atrial fibrillation with RVR     Avascular necrosis     L hand    CHF (congestive heart failure)     Depression     Encounter for blood transfusion 2020    GERD (gastroesophageal reflux disease)     Hx of psychiatric care     Hypertension     Obese     Psychiatric problem     Sleep apnea      Past Surgical History:   Procedure Laterality Date    ABLATION OF ARRHYTHMOGENIC FOCUS FOR ATRIAL FIBRILLATION N/A 2020    Procedure: Ablation atrial fibrillation;  Surgeon: Gabriel Hawley MD;  Location: [facility] EP LAB;  Service: Cardiology;  Laterality: N/A;  afib, PVI, RFA, REENA, SHADY, anes, MB, 3 Prep    ABLATION OF ARRHYTHMOGENIC FOCUS FOR ATRIAL FIBRILLATION N/A 2022    Procedure: Ablation atrial fibrillation;   Surgeon: Gabriel Hawley MD;  Location: [facility] EP LAB;  Service: Cardiology;  Laterality: N/A;  afib/afl, PVI (re-do)/CTI, RFA, REENA (cx if SR), SHADY, anes, MB, 3 Prep    APPLICATION OF WOUND VACUUM-ASSISTED CLOSURE DEVICE Left 8/3/2020    Procedure: APPLICATION, WOUND VAC;  Surgeon: SEMAJ Márquez III, MD;  Location: [facility] OR 2ND FLR;  Service: Peripheral Vascular;  Laterality: Left;    APPLICATION OF WOUND VACUUM-ASSISTED CLOSURE DEVICE Left 8/6/2020    Procedure: APPLICATION, WOUND VAC;  Surgeon: SEMAJ Márquez III, MD;  Location: [facility] OR 2ND FLR;  Service: Peripheral Vascular;  Laterality: Left;    CARPAL TUNNEL RELEASE Right 6/10/2020    Procedure: RELEASE, CARPAL TUNNEL - RIGHT;  Surgeon: Adelaida Hall MD;  Location: [facility] OR;  Service: Orthopedics;  Laterality: Right;  GENERAL AND REGIONAL    CARPAL TUNNEL RELEASE Left 5/5/2021    Procedure: RELEASE, CARPAL TUNNEL, LEFT;  Surgeon: Adelaida Hall MD;  Location: [facility] OR;  Service: Orthopedics;  Laterality: Left;  GENERAL & REGIONAL IN PACU    CLOSURE OF WOUND Left 8/6/2020    Procedure: CLOSURE, WOUND;  Surgeon: SEMAJ Márquez III, MD;  [facility];  Service: Peripheral Vascular;  Laterality: Left;  Complex    COLONOSCOPY N/A 8/17/2021    Procedure: COLONOSCOPY Suprep;  Surgeon: Loco Deluca MD;  Location: [facility];  Service: Endoscopy;  Laterality: N/A;    ESOPHAGOGASTRODUODENOSCOPY N/A 8/17/2021    Procedure: EGD (ESOPHAGOGASTRODUODENOSCOPY);  Surgeon: Loco Deluca MD;  Location: [facility];  Service: Endoscopy;  Laterality: N/A;  Patient is schedule to have her Covid test on 08/14/2021 at the [facility] @ 9:30am. [place].    EXPLORATION OF FEMORAL ARTERY Left 7/21/2020    Procedure: EXPLORATION, ARTERY, FEMORAL;  Surgeon: SEMAJ Márquez III, MD;  Location: [facility] OR 2ND FLR;  Service: Peripheral Vascular;  Laterality: Left;  1. Urgent Direct repair, L SFA branch laceration    FOOT SURGERY       TREATMENT OF CARDIAC ARRHYTHMIA N/A 2020    Procedure: CARDIOVERSION;  Surgeon: Gabriel Hawley MD;  Location: [facility] EP LAB;  Service: Cardiology;  Laterality: N/A;  af, demi, dccv, marlenes, mb, 345    TREATMENT OF CARDIAC ARRHYTHMIA  2022    Procedure: Cardioversion or Defibrillation;  Surgeon: Gabriel Hawley MD;  Location: [facility] EP LAB;  Service: Cardiology;;    WOUND DEBRIDEMENT Left 2020    Procedure: DEBRIDEMENT, WOUND;  Surgeon: SEMAJ Márquez III, MD;  [facility];  Service: Peripheral Vascular;  Laterality: Left;     OB History    No obstetric history on file.       Family History   Problem Relation Age of Onset    Cataracts Mother     Hypertension Mother     Thyroid disease Mother     Diabetes Father     Hypertension Father     Hypertension Sister     Hypertension Brother     Amblyopia Neg Hx     Blindness Neg Hx     Cancer Neg Hx     Glaucoma Neg Hx     Macular degeneration Neg Hx     Retinal detachment Neg Hx     Strabismus Neg Hx     Stroke Neg Hx      Social History     Socioeconomic History    Marital status: Single   Tobacco Use    Smoking status: Former Smoker     Types: Cigarettes     Quit date: 2019     Years since quittin.6    Smokeless tobacco: Never Used   Substance and Sexual Activity    Alcohol use: No    Drug use: No     Current Facility-Administered Medications   Medication    0.9%  NaCl infusion (for blood administration)    0.9%  NaCl infusion (for blood administration)    0.9%  NaCl infusion (for blood administration)    acetaminophen tablet 650 mg    atorvastatin tablet 40 mg    conjugated estrogens injection 25 mg    DULoxetine DR capsule 120 mg    mupirocin 2 % ointment    NORepinephrine 4 mg in dextrose 5% 250 mL infusion (premix) (titrating)    pantoprazole EC tablet 40 mg     Facility-Administered Medications Ordered in Other Encounters   Medication    sodium chloride 0.9% bolus 1,000 mL       Review of  Systems:  Constitutional: no significant weight change, fever, fatigue  Eyes:  No vision changes  Cardiovascular: No chest pain  Respiratory: No shortness of breath or cough  Gastrointestinal: No diarrhea, bloody stool, nausea/vomiting, constipation  Genitourinary: No blood in urine, painful urination, urgency of urination, frequency of urination  Skin/Breast: No painful breasts, nipple discharge, masses  Neurological: No headache  Endocrine: No hot flushes  Psychiatric: No depression or anxiety     OBJECTIVE:     Vital Signs  Temp:  [97.6 °F (36.4 °C)-98.6 °F (37 °C)] 98.6 °F (37 °C)  Pulse:  [60-81] 78  Resp:  [7-35] 20  SpO2:  [92 %-98 %] 95 %  BP: ()/(45-67) 98/53    Physical Exam:  Gen: A&Ox3, NAD, obese  CV: RRR  Pulm: LCTAB  Abd: Soft, non-distended, non-tender to palpation without rebound or guarding  Ext: PPP, no peripheral edema  External genitalia: WNL  Urethra and Meatus: WNL  Vagina: Atrophic, white-pink, multiple orange size clots expressed on exam  Cervix: WNL, no CMT, external os 0.5 cm dilated   Uterus: limited by body habitus, mobile   Adnexa: limited by body habitus, no TTP     Laboratory  Recent Results (from the past 24 hour(s))   CBC auto differential    Collection Time: 02/18/22 10:35 AM   Result Value Ref Range    WBC 11.90 3.90 - 12.70 K/uL    RBC 3.28 (L) 4.00 - 5.40 M/uL    Hemoglobin 10.7 (L) 12.0 - 16.0 g/dL    Hematocrit 33.2 (L) 37.0 - 48.5 %     (H) 82 - 98 fL    MCH 32.6 (H) 27.0 - 31.0 pg    MCHC 32.2 32.0 - 36.0 g/dL    RDW 14.2 11.5 - 14.5 %    Platelets 228 150 - 450 K/uL    MPV 10.8 9.2 - 12.9 fL    Immature Granulocytes 0.4 0.0 - 0.5 %    Gran # (ANC) 9.2 (H) 1.8 - 7.7 K/uL    Immature Grans (Abs) 0.05 (H) 0.00 - 0.04 K/uL    Lymph # 1.6 1.0 - 4.8 K/uL    Mono # 0.8 0.3 - 1.0 K/uL    Eos # 0.1 0.0 - 0.5 K/uL    Baso # 0.10 0.00 - 0.20 K/uL    nRBC 0 0 /100 WBC    Gran % 77.3 (H) 38.0 - 73.0 %    Lymph % 13.6 (L) 18.0 - 48.0 %    Mono % 6.9 4.0 - 15.0 %     Eosinophil % 1.0 0.0 - 8.0 %    Basophil % 0.8 0.0 - 1.9 %    Differential Method Automated    Comprehensive metabolic panel    Collection Time: 02/18/22 10:35 AM   Result Value Ref Range    Sodium 135 (L) 136 - 145 mmol/L    Potassium 5.4 (H) 3.5 - 5.1 mmol/L    Chloride 96 95 - 110 mmol/L    CO2 25 23 - 29 mmol/L    Glucose 127 (H) 70 - 110 mg/dL    BUN 12 6 - 20 mg/dL    Creatinine 1.5 (H) 0.5 - 1.4 mg/dL    Calcium 9.9 8.7 - 10.5 mg/dL    Total Protein 8.0 6.0 - 8.4 g/dL    Albumin 3.4 (L) 3.5 - 5.2 g/dL    Total Bilirubin 0.9 0.1 - 1.0 mg/dL    Alkaline Phosphatase 173 (H) 55 - 135 U/L     (H) 10 - 40 U/L    ALT 29 10 - 44 U/L    Anion Gap 14 8 - 16 mmol/L    eGFR if African American 44.3 (A) >60 mL/min/1.73 m^2    eGFR if non  38.4 (A) >60 mL/min/1.73 m^2   Protime-INR    Collection Time: 02/18/22 10:42 AM   Result Value Ref Range    Prothrombin Time 11.6 9.0 - 12.5 sec    INR 1.1 0.8 - 1.2   Type & Screen    Collection Time: 02/18/22 10:53 AM   Result Value Ref Range    Group & Rh O NEG     Indirect Audie NEG    Prepare RBC 2 Units; hemorrhage    Collection Time: 02/18/22 10:53 AM   Result Value Ref Range    UNIT NUMBER [id number]     Product Code Q7044B39     DISPENSE STATUS TRANSFUSED     CODING SYSTEM NRSQ652     Unit Blood Type Code 9500     Unit Blood Type O NEG     Unit Expiration [id number]     UNIT NUMBER [id number]     Product Code U8416C43     DISPENSE STATUS TRANSFUSED     CODING SYSTEM KSXD971     Unit Blood Type Code 9500     Unit Blood Type O NEG     Unit Expiration [id number]    Prepare RBC 1 Unit    Collection Time: 02/18/22 10:53 AM   Result Value Ref Range    UNIT NUMBER [id number]     Product Code Y4566I01     DISPENSE STATUS CROSSMATCHED     CODING SYSTEM YKAY464     Unit Blood Type Code 9500     Unit Blood Type O NEG     Unit Expiration [id number]    Urinalysis, Reflex to Urine Culture Urine, Clean Catch    Collection Time: 02/18/22 11:41 AM     Specimen: Urine   Result Value Ref Range    Specimen UA Urine, Clean Catch     Color, UA Red (A) Yellow, Straw, Daisy    Appearance, UA Cloudy (A) Clear    pH, UA 5.0 5.0 - 8.0    Specific Gravity, UA 1.020 1.005 - 1.030    Protein, UA 2+ (A) Negative    Glucose, UA Negative Negative    Ketones, UA Negative Negative    Bilirubin (UA) Negative Negative    Occult Blood UA 2+ (A) Negative    Nitrite, UA Negative Negative    Leukocytes, UA Negative Negative   Urinalysis Microscopic    Collection Time: 02/18/22 11:41 AM   Result Value Ref Range    RBC, UA >100 (H) 0 - 4 /hpf    WBC, UA >100 (H) 0 - 5 /hpf    Bacteria Occasional None-Occ /hpf    Squam Epithel, UA 2 /hpf    Hyaline Casts, UA 0 0-1/lpf /lpf    Microscopic Comment SEE COMMENT    POCT COVID-19 Rapid Screening    Collection Time: 02/18/22  1:42 PM   Result Value Ref Range    POC Rapid COVID Negative Negative     Acceptable Yes    Hemoglobin    Collection Time: 02/18/22  1:56 PM   Result Value Ref Range    Hemoglobin 9.9 (L) 12.0 - 16.0 g/dL   CBC Auto Differential    Collection Time: 02/18/22  5:14 PM   Result Value Ref Range    WBC 11.57 3.90 - 12.70 K/uL    RBC 2.91 (L) 4.00 - 5.40 M/uL    Hemoglobin 9.7 (L) 12.0 - 16.0 g/dL    Hematocrit 30.0 (L) 37.0 - 48.5 %     (H) 82 - 98 fL    MCH 33.3 (H) 27.0 - 31.0 pg    MCHC 32.3 32.0 - 36.0 g/dL    RDW 14.8 (H) 11.5 - 14.5 %    Platelets 177 150 - 450 K/uL    MPV 10.9 9.2 - 12.9 fL    Immature Granulocytes 0.4 0.0 - 0.5 %    Gran # (ANC) 8.3 (H) 1.8 - 7.7 K/uL    Immature Grans (Abs) 0.05 (H) 0.00 - 0.04 K/uL    Lymph # 1.9 1.0 - 4.8 K/uL    Mono # 1.1 (H) 0.3 - 1.0 K/uL    Eos # 0.2 0.0 - 0.5 K/uL    Baso # 0.10 0.00 - 0.20 K/uL    nRBC 0 0 /100 WBC    Gran % 71.4 38.0 - 73.0 %    Lymph % 16.5 (L) 18.0 - 48.0 %    Mono % 9.1 4.0 - 15.0 %    Eosinophil % 1.7 0.0 - 8.0 %    Basophil % 0.9 0.0 - 1.9 %    Differential Method Automated    Hemoglobin A1c    Collection Time: 02/18/22  6:27 PM    Result Value Ref Range    Hemoglobin A1C 5.8 (H) 4.0 - 5.6 %    Estimated Avg Glucose 120 68 - 131 mg/dL   Comprehensive Metabolic Panel    Collection Time: 02/18/22  6:29 PM   Result Value Ref Range    Sodium 137 136 - 145 mmol/L    Potassium 4.7 3.5 - 5.1 mmol/L    Chloride 101 95 - 110 mmol/L    CO2 23 23 - 29 mmol/L    Glucose 107 70 - 110 mg/dL    BUN 13 6 - 20 mg/dL    Creatinine 1.9 (H) 0.5 - 1.4 mg/dL    Calcium 9.3 8.7 - 10.5 mg/dL    Total Protein 6.6 6.0 - 8.4 g/dL    Albumin 3.0 (L) 3.5 - 5.2 g/dL    Total Bilirubin 1.4 (H) 0.1 - 1.0 mg/dL    Alkaline Phosphatase 144 (H) 55 - 135 U/L     (H) 10 - 40 U/L    ALT 22 10 - 44 U/L    Anion Gap 13 8 - 16 mmol/L    eGFR if African American 33.3 (A) >60 mL/min/1.73 m^2    eGFR if non  28.9 (A) >60 mL/min/1.73 m^2   CBC Auto Differential    Collection Time: 02/19/22  1:03 AM   Result Value Ref Range    WBC 11.06 3.90 - 12.70 K/uL    RBC 2.85 (L) 4.00 - 5.40 M/uL    Hemoglobin 9.3 (L) 12.0 - 16.0 g/dL    Hematocrit 29.6 (L) 37.0 - 48.5 %     (H) 82 - 98 fL    MCH 32.6 (H) 27.0 - 31.0 pg    MCHC 31.4 (L) 32.0 - 36.0 g/dL    RDW 15.0 (H) 11.5 - 14.5 %    Platelets 186 150 - 450 K/uL    MPV 10.8 9.2 - 12.9 fL    Immature Granulocytes 0.4 0.0 - 0.5 %    Gran # (ANC) 7.7 1.8 - 7.7 K/uL    Immature Grans (Abs) 0.04 0.00 - 0.04 K/uL    Lymph # 1.8 1.0 - 4.8 K/uL    Mono # 1.1 (H) 0.3 - 1.0 K/uL    Eos # 0.3 0.0 - 0.5 K/uL    Baso # 0.09 0.00 - 0.20 K/uL    nRBC 0 0 /100 WBC    Gran % 69.9 38.0 - 73.0 %    Lymph % 16.5 (L) 18.0 - 48.0 %    Mono % 9.6 4.0 - 15.0 %    Eosinophil % 2.8 0.0 - 8.0 %    Basophil % 0.8 0.0 - 1.9 %    Differential Method Automated    Comprehensive Metabolic Panel    Collection Time: 02/19/22  3:30 AM   Result Value Ref Range    Sodium 139 136 - 145 mmol/L    Potassium 4.1 3.5 - 5.1 mmol/L    Chloride 99 95 - 110 mmol/L    CO2 26 23 - 29 mmol/L    Glucose 120 (H) 70 - 110 mg/dL    BUN 18 6 - 20 mg/dL    Creatinine  2.2 (H) 0.5 - 1.4 mg/dL    Calcium 8.7 8.7 - 10.5 mg/dL    Total Protein 6.0 6.0 - 8.4 g/dL    Albumin 2.7 (L) 3.5 - 5.2 g/dL    Total Bilirubin 1.1 (H) 0.1 - 1.0 mg/dL    Alkaline Phosphatase 129 55 - 135 U/L     (H) 10 - 40 U/L    ALT 20 10 - 44 U/L    Anion Gap 14 8 - 16 mmol/L    eGFR if African American 27.9 (A) >60 mL/min/1.73 m^2    eGFR if non  24.2 (A) >60 mL/min/1.73 m^2   CBC Auto Differential    Collection Time: 02/19/22  5:54 AM   Result Value Ref Range    WBC 11.57 3.90 - 12.70 K/uL    RBC 2.80 (L) 4.00 - 5.40 M/uL    Hemoglobin 9.3 (L) 12.0 - 16.0 g/dL    Hematocrit 28.0 (L) 37.0 - 48.5 %     (H) 82 - 98 fL    MCH 33.2 (H) 27.0 - 31.0 pg    MCHC 33.2 32.0 - 36.0 g/dL    RDW 15.0 (H) 11.5 - 14.5 %    Platelets 190 150 - 450 K/uL    MPV 11.3 9.2 - 12.9 fL    Immature Granulocytes 0.4 0.0 - 0.5 %    Gran # (ANC) 7.9 (H) 1.8 - 7.7 K/uL    Immature Grans (Abs) 0.05 (H) 0.00 - 0.04 K/uL    Lymph # 2.1 1.0 - 4.8 K/uL    Mono # 1.1 (H) 0.3 - 1.0 K/uL    Eos # 0.3 0.0 - 0.5 K/uL    Baso # 0.11 0.00 - 0.20 K/uL    nRBC 0 0 /100 WBC    Gran % 68.0 38.0 - 73.0 %    Lymph % 17.9 (L) 18.0 - 48.0 %    Mono % 9.9 4.0 - 15.0 %    Eosinophil % 2.8 0.0 - 8.0 %    Basophil % 1.0 0.0 - 1.9 %    Differential Method Automated        Diagnostic Results:  EXAMINATION:  US PELVIS COMP WITH TRANSVAG NON-OB (XPD)     CLINICAL HISTORY:  post menopausal vaginal bleeding, on eliquis;     TECHNIQUE:  Transabdominal sonography of the pelvis was performed, followed by transvaginal sonography to better evaluate the uterus and ovaries.     COMPARISON:  None.     FINDINGS:  The uterus measures a proximally 11.0 x 8.6 x 6.8 cm.  The endometrial stripe is thickened measuring up to 1.7 cm.  There is heterogeneous avascular material within the lower uterine segment extending to the cervical canal suggesting blood products.  There is a partially calcified fibroid measuring approximately 2.9 x 2.3 x 3.1  cm.     The ovaries are not identified.  There is trace fluid in the pelvis.  There are a few prominent vessels within the left adnexa, correlation with any history of pelvic congestion syndrome.     Impression:     This report was flagged in Epic as abnormal.     Endometrial thickening, abnormal in this reportedly postmenopausal patient.  There is heterogeneous avascular material within the lower uterine segment extending to the cervical canal concerning for blood products.  Correlation is advised, consultation with OBGYN is recommended.     Uterine leiomyoma.        Electronically signed by: Rajiv Fernando MD    ASSESSMENT/PLAN:     Active Hospital Problems    Diagnosis  POA    *Hemorrhagic shock [R57.8]  Yes    Vagina bleeding [N93.9]  Yes    JHONATAN (acute kidney injury) [N17.9]  Yes    Pure hypercholesterolemia [E78.00]  Yes     Chronic    Atrial Fibrillation (paroxysmal) [I48.0]  Yes    Severe obesity (BMI >= 40) [E66.01]  Yes    Essential hypertension [I10]  Yes     Chronic      Resolved Hospital Problems   No resolved problems to display.       Vicky Alvarado is a 56 y.o.  who presents with heavy postmenopausal bleeding.     1. Postmenopausal Bleeding:   - VSS   - CBC: 10/33/228, H/h stable in the setting of heavy postmenopausal bleeding   - TVUS: endometrial lining measuring up to 1.7 cm, calcified fibroid 2.9x2.3x3.1 cm   - IV Estrogen q 6 hrs for 24 hrs; can discharge home with high-dose progesterone 20 mg TID for seven days, then taper to 20 mg BID until able to follow-up with gynecology in outpatient setting   - Strict pad counts (document saturation: 0%, 25%, 50%, 75%, 100%)   - If bleeding continue to be heavy, may have to perform hysteroscopy, dilation and curettage.   - Discussed abnormal postmenopausal bleeding and concern for evaluation to rule out endometrial hyperplasia or neoplasia. Will need to complete evaluation in outpatient setting, including endometrial biopsy.   - Will  message clinic to set up with appointment closer to discharge.   - Will continue to closely monitor.     2. Depression/ Anxiety:   - Continue home Cymbalta     3. HLD:   - Continue home statin     4. HTN:   - Per primary team, holding antihypertensives     5. H/o Atrial Fibrillation:   - Per primary team, holding metoprolol and propafenone  - Holding eliquis at this time     Sola Reddy MD/MPH  OB/GYN PGY-1   [facility]

## 2022-02-18 NOTE — HPI
Vicky Alvarado is a 56 year old female with HTN and history of atrial fibrillation s/p ablation 2 weeks ago (still on eliquis) who presented to [facility] ED on 2/18/22 complaining of vaginal bleeding for past 2 days. Patient reports she started noticed significant vaginal bleeding and pass clots on 2/16. Described some clots as the size of oranges. Reports associated abdominal cramping with vaginal bleeding. She is having to wear two large menstrual pads and changing them frequently. Standing up causes her to bleed more. Patient mentions that she has not menstruated in the last 5 years. Other associated symptoms included fatigue, dizziness, lightheadedness and NAPIER. Denies fever, chills, cough, chest pain, or sick contacts. Reports taking her last dose of eliquis on evening of 2/17 and took her antihypertensive medications this morning.     In the ED, patient found to be hypotensive with active vaginal hemorrhage. Hemoglobin at that time was 10.7.1 unit of emergency blood given. Gynecology consulted and recommending IV estrogen. Critical care medicine consulted for hemorrhagic shock.

## 2022-02-18 NOTE — CONSULTS
Consult received. Patient to be admitted to the MICU. Full H&P to follow.    Loraine Antonio PA-C  Critical Care Medicine  2/18/2022   3:57 PM

## 2022-02-18 NOTE — SUBJECTIVE & OBJECTIVE
Past Medical History:   Diagnosis Date    Anticoagulant long-term use     Arthritis     Atrial fibrillation     cardioversion    Atrial fibrillation with RVR     Avascular necrosis     L hand    CHF (congestive heart failure)     Depression     Encounter for blood transfusion 07/22/2020    GERD (gastroesophageal reflux disease)     Hx of psychiatric care     Hypertension     Obese     Psychiatric problem     Sleep apnea        Past Surgical History:   Procedure Laterality Date    ABLATION OF ARRHYTHMOGENIC FOCUS FOR ATRIAL FIBRILLATION N/A 7/20/2020    Procedure: Ablation atrial fibrillation;  Surgeon: Gabriel Hawley MD;  Location: [facility] EP LAB;  Service: Cardiology;  Laterality: N/A;  afib, PVI, RFA, REENA, SHADY, anes, MB, 3 Prep    ABLATION OF ARRHYTHMOGENIC FOCUS FOR ATRIAL FIBRILLATION N/A 1/24/2022    Procedure: Ablation atrial fibrillation;  Surgeon: Gabriel Hawley MD;  Location: [facility] EP LAB;  Service: Cardiology;  Laterality: N/A;  afib/afl, PVI (re-do)/CTI, RFA, REENA (cx if SR), SHADY, anes, MB, 3 Prep    APPLICATION OF WOUND VACUUM-ASSISTED CLOSURE DEVICE Left 8/3/2020    Procedure: APPLICATION, WOUND VAC;  Surgeon: SEMAJ Márquez III, MD;  Location: [facility] OR 2ND FLR;  Service: Peripheral Vascular;  Laterality: Left;    APPLICATION OF WOUND VACUUM-ASSISTED CLOSURE DEVICE Left 8/6/2020    Procedure: APPLICATION, WOUND VAC;  Surgeon: SEMAJ Márquez III, MD;  Location: [facility] OR 2ND FLR;  Service: Peripheral Vascular;  Laterality: Left;    CARPAL TUNNEL RELEASE Right 6/10/2020    Procedure: RELEASE, CARPAL TUNNEL - RIGHT;  Surgeon: Adelaida Hall MD;  Location: [facility] OR;  Service: Orthopedics;  Laterality: Right;  GENERAL AND REGIONAL    CARPAL TUNNEL RELEASE Left 5/5/2021    Procedure: RELEASE, CARPAL TUNNEL, LEFT;  Surgeon: Adelaida Hall MD;  Location: [facility] OR;  Service: Orthopedics;  Laterality: Left;  GENERAL & REGIONAL IN PACU    CLOSURE OF WOUND Left 8/6/2020     Procedure: CLOSURE, WOUND;  Surgeon: SEMAJ Márquez III, MD;  Location: [facility] OR [address] [place] [address];  Service: Peripheral Vascular;  Laterality: Left;  Complex    COLONOSCOPY N/A 2021    Procedure: COLONOSCOPY Suprep;  Surgeon: Loco Deluca MD;  Location: [facility] ENDO;  Service: Endoscopy;  Laterality: N/A;    ESOPHAGOGASTRODUODENOSCOPY N/A 2021    Procedure: EGD (ESOPHAGOGASTRODUODENOSCOPY);  Surgeon: Loco Deluca MD;  Location: [facility] ENDO;  Service: Endoscopy;  Laterality: N/A;  Patient is schedule to have her Covid test on 2021 at the [facility] @ 9:30am. [place]    EXPLORATION OF FEMORAL ARTERY Left 2020    Procedure: EXPLORATION, ARTERY, FEMORAL;  Surgeon: SEMAJ Márquez III, MD;  Location: [facility] OR [address] [place] [address];  Service: Peripheral Vascular;  Laterality: Left;  1. Urgent Direct repair, L SFA branch laceration    FOOT SURGERY      TREATMENT OF CARDIAC ARRHYTHMIA N/A 2020    Procedure: CARDIOVERSION;  Surgeon: Gabriel Hawley MD;  Location: [facility] EP LAB;  Service: Cardiology;  Laterality: N/A;  af, demi, dccv, anes, mb, 345    TREATMENT OF CARDIAC ARRHYTHMIA  2022    Procedure: Cardioversion or Defibrillation;  Surgeon: Gabriel Hawley MD;  Location: [facility] EP LAB;  Service: Cardiology;;    WOUND DEBRIDEMENT Left 2020    Procedure: DEBRIDEMENT, WOUND;  Surgeon: SEMAJ Márquez III, MD;  [facility];  Service: Peripheral Vascular;  Laterality: Left;       Review of patient's allergies indicates:   Allergen Reactions    Flecainide Shortness Of Breath and Swelling       Family History     Problem Relation (Age of Onset)    Cataracts Mother    Diabetes Father    Hypertension Mother, Father, Sister, Brother    Thyroid disease Mother        Tobacco Use    Smoking status: Former Smoker     Types: Cigarettes     Quit date: 2019     Years since quittin.6    Smokeless tobacco: Never Used   Substance and Sexual Activity    Alcohol use: No     Drug use: No    Sexual activity: Not on file      Review of Systems   Constitutional: Positive for fatigue. Negative for chills and fever.   HENT: Negative for congestion and sinus pain.    Eyes: Negative for photophobia and visual disturbance.   Respiratory: Positive for shortness of breath. Negative for cough.    Cardiovascular: Negative for chest pain, palpitations and leg swelling.   Gastrointestinal: Positive for abdominal pain. Negative for blood in stool, nausea and vomiting.   Endocrine: Negative for cold intolerance and heat intolerance.   Genitourinary: Positive for vaginal bleeding. Negative for frequency and urgency.   Musculoskeletal: Positive for back pain. Negative for arthralgias and neck stiffness.   Skin: Negative for pallor and rash.   Neurological: Positive for dizziness and light-headedness.   Hematological: Does not bruise/bleed easily.   Psychiatric/Behavioral: Negative for decreased concentration and dysphoric mood.     Objective:     Vital Signs (Most Recent):  Temp: 98.1 °F (36.7 °C) (02/18/22 1745)  Pulse: 66 (02/18/22 1745)  Resp: 16 (02/18/22 1745)  BP: (!) 85/45 (02/18/22 1745)  SpO2: (!) 93 % (02/18/22 1745) Vital Signs (24h Range):  Temp:  [97.6 °F (36.4 °C)-98.2 °F (36.8 °C)] 98.1 °F (36.7 °C)  Pulse:  [60-76] 66  Resp:  [16-20] 16  SpO2:  [92 %-98 %] 93 %  BP: ()/(45-67) 85/45   Weight: (!) 142.9 kg (315 lb)  Body mass index is 50.84 kg/m².      Intake/Output Summary (Last 24 hours) at 2/18/2022 1752  Last data filed at 2/18/2022 1439  Gross per 24 hour   Intake 1251 ml   Output --   Net 1251 ml       Physical Exam  Vitals reviewed.   Constitutional:       Appearance: She is well-developed and well-nourished. She is morbidly obese.   HENT:      Head: Normocephalic and atraumatic.   Eyes:      Extraocular Movements: EOM normal.      Pupils: Pupils are equal, round, and reactive to light.   Neck:      Thyroid: No thyromegaly.      Trachea: No tracheal deviation.    Cardiovascular:      Rate and Rhythm: Normal rate and regular rhythm.      Heart sounds: Normal heart sounds. No murmur heard.  No friction rub. No gallop.    Pulmonary:      Effort: Pulmonary effort is normal.      Breath sounds: Normal breath sounds.   Abdominal:      General: Bowel sounds are normal.      Palpations: Abdomen is soft.      Tenderness: There is abdominal tenderness in the suprapubic area.   Musculoskeletal:         General: Normal range of motion.      Right lower leg: No edema.      Left lower leg: No edema.   Skin:     General: Skin is warm and dry.   Neurological:      Mental Status: She is alert and oriented to person, place, and time.      Sensory: No sensory deficit.   Psychiatric:         Behavior: Behavior is cooperative.         Vents: on room air     Lines/Drains/Airways     Peripheral Intravenous Line                 Peripheral IV - Single Lumen 02/18/22 1035 20 G Left Forearm <1 day         Peripheral IV - Single Lumen 02/18/22 1354 20 G Left Antecubital <1 day              Significant Labs:    CBC/Anemia Profile:  Recent Labs   Lab 02/18/22  1035 02/18/22  1356   WBC 11.90  --    HGB 10.7* 9.9*   HCT 33.2*  --      --    *  --    RDW 14.2  --         Chemistries:  Recent Labs   Lab 02/18/22  1035   *   K 5.4*   CL 96   CO2 25   BUN 12   CREATININE 1.5*   CALCIUM 9.9   ALBUMIN 3.4*   PROT 8.0   BILITOT 0.9   ALKPHOS 173*   ALT 29   *       All pertinent labs within the past 24 hours have been reviewed.    Significant Imaging: I have reviewed all pertinent imaging results/findings within the past 24 hours.

## 2022-02-18 NOTE — PHARMACY MED REC
"Admission Medication History     The home medication history was taken by Marlo Thomas.    You may go to "Admission" then "Reconcile Home Medications" tabs to review and/or act upon these items.      The home medication list has been updated by the Pharmacy department.    Please read ALL comments highlighted in yellow.    Please address this information as you see fit.     Feel free to contact us if you have any questions or require assistance.      The medications listed below were removed from the home medication list. Please reorder if appropriate:  Patient reports no longer taking the following medication(s):   MAGNESIUM OXIDE 400 MG   METHYLPREDNISOLONE DOSEPACK 4 MG     Medications listed below were obtained from: Patient/family     Current Outpatient Medications on File Prior to Encounter:   Medication Sig    acetaminophen (TYLENOL) 500 MG tablet   Take 2 tablets (1,000 mg) by mouth at onset of pain or headache, may repeat if needed.    ALPRAZolam (XANAX) 0.5 MG tablet   Take 1 tablet (0.5 mg total) by mouth 2 (two) times daily as needed for Anxiety.    apixaban (ELIQUIS) 5 mg Tab   Take 1 tablet (5 mg total) by mouth 2 (two) times daily.    atorvastatin (LIPITOR) 40 MG tablet   Take 1 tablet (40 mg total) by mouth once daily.    b complex vitamins capsule   Take 1 capsule by mouth once daily.    colchicine (COLCRYS) 0.6 mg tablet     For gout attack: Take TWO tablets by mouth, then, 2 hours later, take ONE additional tablet. Then take 1 tablet twice daily only if still needed for gout pain.)    DULoxetine (CYMBALTA) 60 MG capsule   Take 2 capsules (120 mg total) by mouth once daily.    ferrous sulfate (SLOW RELEASE IRON) 142 mg (45 mg iron) TbSR   Take 1 tablet by mouth once daily.    furosemide (LASIX) 40 MG tablet   Take 1 tablet (40 mg total) by mouth 2 (two) times daily.    gluc-sh[facility]ha-[facility] [place]6-C-tkkw-reymundo-bor 750 mg-644 mg- 30 mg-1 mg Tab   Take 1 tablet by mouth once daily.     " krill/om-3/dha/epa/phospho/ast (MEGARED OMEGA-3 KRILL OIL ORAL)   Take 1 capsule by mouth once daily.    lisinopriL (PRINIVIL,ZESTRIL) 40 MG tablet   Take 1 tablet (40 mg total) by mouth once daily.    melatonin 10 mg Tab   Take 1-2 tablets by mouth nightly as needed (Sleep).    metoprolol succinate (TOPROL-XL) 50 MG 24 hr tablet   Take 1 tablet (50 mg total) by mouth 2 (two) times daily.    multivitamin (THERAGRAN) per tablet   Take 1 tablet by mouth once daily.    NIFEdipine (PROCARDIA-XL) 90 MG (OSM) 24 hr tablet   Take 1 tablet (90 mg total) by mouth once daily.    omeprazole (PRILOSEC) 20 MG capsule   TAKE 1 CAPSULE BY MOUTH ONCE DAILY    polyethylene glycol (MIRALAX) 17 gram PwPk   Take by mouth as needed (constipation).    predniSONE (DELTASONE) 10 MG tablet   Take by mouth daily as needed (alternates with Colchicine).    propafenone (RYTHMOL) 225 MG Tab   Take 1 tablet (225 mg total) by mouth every 8 (eight) hours.    traZODone (DESYREL) 100 MG tablet   Take 1 tablet (100 mg total) by mouth nightly as needed for Insomnia.    UNABLE TO FIND   Take 1 tablet by mouth once daily. Trino's Leg Cramps    potassium chloride SA (K-DUR,KLOR-CON) 20 MEQ tablet Take 1 tablet (20 mEq total) by mouth once daily.       Potential issues to be addressed PRIOR TO DISCHARGE  Patient reported not taking the following medications: (PROTONIX). This medication remain on the home medication list. Please address accordingly.     Marlo Thomas CPhT  EXT [phone]                    .

## 2022-02-19 ENCOUNTER — ANESTHESIA EVENT (OUTPATIENT)
Dept: SURGERY | Facility: HOSPITAL | Age: 57
DRG: 744 | End: 2022-02-19
Payer: COMMERCIAL

## 2022-02-19 ENCOUNTER — ANESTHESIA (OUTPATIENT)
Dept: SURGERY | Facility: HOSPITAL | Age: 57
DRG: 744 | End: 2022-02-19
Payer: COMMERCIAL

## 2022-02-19 LAB
ALBUMIN SERPL BCP-MCNC: 2.7 G/DL (ref 3.5–5.2)
ALP SERPL-CCNC: 129 U/L (ref 55–135)
ALT SERPL W/O P-5'-P-CCNC: 20 U/L (ref 10–44)
ANION GAP SERPL CALC-SCNC: 14 MMOL/L (ref 8–16)
AST SERPL-CCNC: 107 U/L (ref 10–40)
BACTERIA UR CULT: NORMAL
BASOPHILS # BLD AUTO: 0.09 K/UL (ref 0–0.2)
BASOPHILS # BLD AUTO: 0.11 K/UL (ref 0–0.2)
BASOPHILS NFR BLD: 0.6 % (ref 0–1.9)
BASOPHILS NFR BLD: 0.8 % (ref 0–1.9)
BASOPHILS NFR BLD: 1 % (ref 0–1.9)
BASOPHILS NFR BLD: 1 % (ref 0–1.9)
BILIRUB SERPL-MCNC: 1.1 MG/DL (ref 0.1–1)
BUN SERPL-MCNC: 18 MG/DL (ref 6–20)
CALCIUM SERPL-MCNC: 8.7 MG/DL (ref 8.7–10.5)
CHLORIDE SERPL-SCNC: 99 MMOL/L (ref 95–110)
CO2 SERPL-SCNC: 26 MMOL/L (ref 23–29)
CREAT SERPL-MCNC: 2.2 MG/DL (ref 0.5–1.4)
DIFFERENTIAL METHOD: ABNORMAL
EOSINOPHIL # BLD AUTO: 0.3 K/UL (ref 0–0.5)
EOSINOPHIL NFR BLD: 1.4 % (ref 0–8)
EOSINOPHIL NFR BLD: 2.7 % (ref 0–8)
EOSINOPHIL NFR BLD: 2.8 % (ref 0–8)
EOSINOPHIL NFR BLD: 2.8 % (ref 0–8)
ERYTHROCYTE [DISTWIDTH] IN BLOOD BY AUTOMATED COUNT: 14.9 % (ref 11.5–14.5)
ERYTHROCYTE [DISTWIDTH] IN BLOOD BY AUTOMATED COUNT: 15 % (ref 11.5–14.5)
ERYTHROCYTE [DISTWIDTH] IN BLOOD BY AUTOMATED COUNT: 15 % (ref 11.5–14.5)
ERYTHROCYTE [DISTWIDTH] IN BLOOD BY AUTOMATED COUNT: 15.2 % (ref 11.5–14.5)
EST. GFR  (AFRICAN AMERICAN): 27.9 ML/MIN/1.73 M^2
EST. GFR  (NON AFRICAN AMERICAN): 24.2 ML/MIN/1.73 M^2
GLUCOSE SERPL-MCNC: 120 MG/DL (ref 70–110)
HCT VFR BLD AUTO: 24.4 % (ref 37–48.5)
HCT VFR BLD AUTO: 24.5 % (ref 37–48.5)
HCT VFR BLD AUTO: 28 % (ref 37–48.5)
HCT VFR BLD AUTO: 29.6 % (ref 37–48.5)
HGB BLD-MCNC: 7.7 G/DL (ref 12–16)
HGB BLD-MCNC: 8 G/DL (ref 12–16)
HGB BLD-MCNC: 9.3 G/DL (ref 12–16)
HGB BLD-MCNC: 9.3 G/DL (ref 12–16)
IMM GRANULOCYTES # BLD AUTO: 0.04 K/UL (ref 0–0.04)
IMM GRANULOCYTES # BLD AUTO: 0.05 K/UL (ref 0–0.04)
IMM GRANULOCYTES # BLD AUTO: 0.11 K/UL (ref 0–0.04)
IMM GRANULOCYTES # BLD AUTO: 0.13 K/UL (ref 0–0.04)
IMM GRANULOCYTES NFR BLD AUTO: 0.4 % (ref 0–0.5)
IMM GRANULOCYTES NFR BLD AUTO: 0.4 % (ref 0–0.5)
IMM GRANULOCYTES NFR BLD AUTO: 0.7 % (ref 0–0.5)
IMM GRANULOCYTES NFR BLD AUTO: 1 % (ref 0–0.5)
LYMPHOCYTES # BLD AUTO: 1.8 K/UL (ref 1–4.8)
LYMPHOCYTES # BLD AUTO: 2 K/UL (ref 1–4.8)
LYMPHOCYTES # BLD AUTO: 2.1 K/UL (ref 1–4.8)
LYMPHOCYTES # BLD AUTO: 2.5 K/UL (ref 1–4.8)
LYMPHOCYTES NFR BLD: 12.8 % (ref 18–48)
LYMPHOCYTES NFR BLD: 16.5 % (ref 18–48)
LYMPHOCYTES NFR BLD: 17.3 % (ref 18–48)
LYMPHOCYTES NFR BLD: 17.9 % (ref 18–48)
MCH RBC QN AUTO: 31.6 PG (ref 27–31)
MCH RBC QN AUTO: 32.6 PG (ref 27–31)
MCH RBC QN AUTO: 32.9 PG (ref 27–31)
MCH RBC QN AUTO: 33.2 PG (ref 27–31)
MCHC RBC AUTO-ENTMCNC: 31.4 G/DL (ref 32–36)
MCHC RBC AUTO-ENTMCNC: 31.6 G/DL (ref 32–36)
MCHC RBC AUTO-ENTMCNC: 32.7 G/DL (ref 32–36)
MCHC RBC AUTO-ENTMCNC: 33.2 G/DL (ref 32–36)
MCV RBC AUTO: 100 FL (ref 82–98)
MCV RBC AUTO: 100 FL (ref 82–98)
MCV RBC AUTO: 101 FL (ref 82–98)
MCV RBC AUTO: 104 FL (ref 82–98)
MONOCYTES # BLD AUTO: 1.1 K/UL (ref 0.3–1)
MONOCYTES # BLD AUTO: 1.1 K/UL (ref 0.3–1)
MONOCYTES # BLD AUTO: 1.3 K/UL (ref 0.3–1)
MONOCYTES # BLD AUTO: 1.6 K/UL (ref 0.3–1)
MONOCYTES NFR BLD: 11.3 % (ref 4–15)
MONOCYTES NFR BLD: 8 % (ref 4–15)
MONOCYTES NFR BLD: 9.6 % (ref 4–15)
MONOCYTES NFR BLD: 9.9 % (ref 4–15)
NEUTROPHILS # BLD AUTO: 14.8 K/UL (ref 1.8–7.7)
NEUTROPHILS # BLD AUTO: 7.6 K/UL (ref 1.8–7.7)
NEUTROPHILS # BLD AUTO: 7.7 K/UL (ref 1.8–7.7)
NEUTROPHILS # BLD AUTO: 7.9 K/UL (ref 1.8–7.7)
NEUTROPHILS NFR BLD: 66.7 % (ref 38–73)
NEUTROPHILS NFR BLD: 68 % (ref 38–73)
NEUTROPHILS NFR BLD: 69.9 % (ref 38–73)
NEUTROPHILS NFR BLD: 76.5 % (ref 38–73)
NRBC BLD-RTO: 0 /100 WBC
PLATELET # BLD AUTO: 186 K/UL (ref 150–450)
PLATELET # BLD AUTO: 190 K/UL (ref 150–450)
PLATELET # BLD AUTO: 197 K/UL (ref 150–450)
PLATELET # BLD AUTO: 213 K/UL (ref 150–450)
PMV BLD AUTO: 10.6 FL (ref 9.2–12.9)
PMV BLD AUTO: 10.8 FL (ref 9.2–12.9)
PMV BLD AUTO: 11.1 FL (ref 9.2–12.9)
PMV BLD AUTO: 11.3 FL (ref 9.2–12.9)
POTASSIUM SERPL-SCNC: 4.1 MMOL/L (ref 3.5–5.1)
PROT SERPL-MCNC: 6 G/DL (ref 6–8.4)
RBC # BLD AUTO: 2.43 M/UL (ref 4–5.4)
RBC # BLD AUTO: 2.44 M/UL (ref 4–5.4)
RBC # BLD AUTO: 2.8 M/UL (ref 4–5.4)
RBC # BLD AUTO: 2.85 M/UL (ref 4–5.4)
SODIUM SERPL-SCNC: 139 MMOL/L (ref 136–145)
WBC # BLD AUTO: 11.06 K/UL (ref 3.9–12.7)
WBC # BLD AUTO: 11.4 K/UL (ref 3.9–12.7)
WBC # BLD AUTO: 11.57 K/UL (ref 3.9–12.7)
WBC # BLD AUTO: 19.36 K/UL (ref 3.9–12.7)

## 2022-02-19 PROCEDURE — 25000003 PHARM REV CODE 250: Performed by: STUDENT IN AN ORGANIZED HEALTH CARE EDUCATION/TRAINING PROGRAM

## 2022-02-19 PROCEDURE — 25000003 PHARM REV CODE 250: Performed by: INTERNAL MEDICINE

## 2022-02-19 PROCEDURE — 37000009 HC ANESTHESIA EA ADD 15 MINS: Performed by: OBSTETRICS & GYNECOLOGY

## 2022-02-19 PROCEDURE — 63600175 PHARM REV CODE 636 W HCPCS: Performed by: NURSE ANESTHETIST, CERTIFIED REGISTERED

## 2022-02-19 PROCEDURE — P9021 RED BLOOD CELLS UNIT: HCPCS | Performed by: NURSE PRACTITIONER

## 2022-02-19 PROCEDURE — 85025 COMPLETE CBC W/AUTO DIFF WBC: CPT | Mod: 91 | Performed by: STUDENT IN AN ORGANIZED HEALTH CARE EDUCATION/TRAINING PROGRAM

## 2022-02-19 PROCEDURE — 99900035 HC TECH TIME PER 15 MIN (STAT)

## 2022-02-19 PROCEDURE — 25000003 PHARM REV CODE 250: Performed by: NURSE ANESTHETIST, CERTIFIED REGISTERED

## 2022-02-19 PROCEDURE — 85025 COMPLETE CBC W/AUTO DIFF WBC: CPT | Performed by: GENERAL ACUTE CARE HOSPITAL

## 2022-02-19 PROCEDURE — 36000707: Performed by: OBSTETRICS & GYNECOLOGY

## 2022-02-19 PROCEDURE — 94761 N-INVAS EAR/PLS OXIMETRY MLT: CPT

## 2022-02-19 PROCEDURE — 36000706: Performed by: OBSTETRICS & GYNECOLOGY

## 2022-02-19 PROCEDURE — D9220A PRA ANESTHESIA: ICD-10-PCS | Mod: ,,, | Performed by: ANESTHESIOLOGY

## 2022-02-19 PROCEDURE — 99291 PR CRITICAL CARE, E/M 30-74 MINUTES: ICD-10-PCS | Mod: ,,, | Performed by: INTERNAL MEDICINE

## 2022-02-19 PROCEDURE — 88305 TISSUE EXAM BY PATHOLOGIST: CPT | Mod: 26,,, | Performed by: STUDENT IN AN ORGANIZED HEALTH CARE EDUCATION/TRAINING PROGRAM

## 2022-02-19 PROCEDURE — 58120 PR DILATION/CURETTAGE,DIAGNOSTIC: ICD-10-PCS | Mod: ,,, | Performed by: STUDENT IN AN ORGANIZED HEALTH CARE EDUCATION/TRAINING PROGRAM

## 2022-02-19 PROCEDURE — 37000008 HC ANESTHESIA 1ST 15 MINUTES: Performed by: OBSTETRICS & GYNECOLOGY

## 2022-02-19 PROCEDURE — 63600175 PHARM REV CODE 636 W HCPCS: Mod: JG | Performed by: STUDENT IN AN ORGANIZED HEALTH CARE EDUCATION/TRAINING PROGRAM

## 2022-02-19 PROCEDURE — D9220A PRA ANESTHESIA: Mod: ,,, | Performed by: ANESTHESIOLOGY

## 2022-02-19 PROCEDURE — 99291 CRITICAL CARE FIRST HOUR: CPT | Mod: ,,, | Performed by: INTERNAL MEDICINE

## 2022-02-19 PROCEDURE — 88305 TISSUE EXAM BY PATHOLOGIST: CPT | Performed by: STUDENT IN AN ORGANIZED HEALTH CARE EDUCATION/TRAINING PROGRAM

## 2022-02-19 PROCEDURE — 36430 TRANSFUSION BLD/BLD COMPNT: CPT

## 2022-02-19 PROCEDURE — 63600175 PHARM REV CODE 636 W HCPCS: Mod: JG | Performed by: INTERNAL MEDICINE

## 2022-02-19 PROCEDURE — 27000221 HC OXYGEN, UP TO 24 HOURS

## 2022-02-19 PROCEDURE — 58120 DILATION AND CURETTAGE: CPT | Mod: ,,, | Performed by: STUDENT IN AN ORGANIZED HEALTH CARE EDUCATION/TRAINING PROGRAM

## 2022-02-19 PROCEDURE — 25000003 PHARM REV CODE 250: Performed by: PHYSICIAN ASSISTANT

## 2022-02-19 PROCEDURE — 20000000 HC ICU ROOM

## 2022-02-19 PROCEDURE — 80053 COMPREHEN METABOLIC PANEL: CPT | Performed by: GENERAL ACUTE CARE HOSPITAL

## 2022-02-19 PROCEDURE — 88305 TISSUE EXAM BY PATHOLOGIST: ICD-10-PCS | Mod: 26,,, | Performed by: STUDENT IN AN ORGANIZED HEALTH CARE EDUCATION/TRAINING PROGRAM

## 2022-02-19 RX ORDER — PHENYLEPHRINE HCL IN 0.9% NACL 1 MG/10 ML
SYRINGE (ML) INTRAVENOUS
Status: DISCONTINUED | OUTPATIENT
Start: 2022-02-19 | End: 2022-02-19

## 2022-02-19 RX ORDER — SODIUM CHLORIDE 0.9 % (FLUSH) 0.9 %
10 SYRINGE (ML) INJECTION
Status: DISCONTINUED | OUTPATIENT
Start: 2022-02-19 | End: 2022-02-27 | Stop reason: HOSPADM

## 2022-02-19 RX ORDER — SODIUM CHLORIDE 9 MG/ML
INJECTION, SOLUTION INTRAVENOUS CONTINUOUS
Status: CANCELLED | OUTPATIENT
Start: 2022-02-19

## 2022-02-19 RX ORDER — MUPIROCIN 20 MG/G
OINTMENT TOPICAL
Status: CANCELLED | OUTPATIENT
Start: 2022-02-19

## 2022-02-19 RX ORDER — ETOMIDATE 2 MG/ML
INJECTION INTRAVENOUS
Status: DISCONTINUED | OUTPATIENT
Start: 2022-02-19 | End: 2022-02-19

## 2022-02-19 RX ORDER — SODIUM CHLORIDE 9 MG/ML
INJECTION, SOLUTION INTRAVENOUS CONTINUOUS
Status: ACTIVE | OUTPATIENT
Start: 2022-02-19 | End: 2022-02-19

## 2022-02-19 RX ORDER — CARBOPROST TROMETHAMINE 250 UG/ML
250 INJECTION, SOLUTION INTRAMUSCULAR
Status: DISCONTINUED | OUTPATIENT
Start: 2022-02-19 | End: 2022-02-27 | Stop reason: HOSPADM

## 2022-02-19 RX ORDER — HYDROCODONE BITARTRATE AND ACETAMINOPHEN 500; 5 MG/1; MG/1
TABLET ORAL
Status: DISCONTINUED | OUTPATIENT
Start: 2022-02-19 | End: 2022-02-21

## 2022-02-19 RX ORDER — ONDANSETRON 2 MG/ML
INJECTION INTRAMUSCULAR; INTRAVENOUS
Status: DISCONTINUED | OUTPATIENT
Start: 2022-02-19 | End: 2022-02-19

## 2022-02-19 RX ORDER — TRAZODONE HYDROCHLORIDE 100 MG/1
100 TABLET ORAL NIGHTLY PRN
Status: DISCONTINUED | OUTPATIENT
Start: 2022-02-19 | End: 2022-02-27 | Stop reason: HOSPADM

## 2022-02-19 RX ORDER — NEOSTIGMINE METHYLSULFATE 0.5 MG/ML
INJECTION, SOLUTION INTRAVENOUS
Status: DISCONTINUED | OUTPATIENT
Start: 2022-02-19 | End: 2022-02-19

## 2022-02-19 RX ORDER — MIDAZOLAM HYDROCHLORIDE 1 MG/ML
INJECTION, SOLUTION INTRAMUSCULAR; INTRAVENOUS
Status: DISCONTINUED | OUTPATIENT
Start: 2022-02-19 | End: 2022-02-19

## 2022-02-19 RX ORDER — MISOPROSTOL 200 UG/1
800 TABLET ORAL ONCE AS NEEDED
Status: DISCONTINUED | OUTPATIENT
Start: 2022-02-19 | End: 2022-02-21

## 2022-02-19 RX ORDER — ROCURONIUM BROMIDE 10 MG/ML
INJECTION, SOLUTION INTRAVENOUS
Status: DISCONTINUED | OUTPATIENT
Start: 2022-02-19 | End: 2022-02-19

## 2022-02-19 RX ORDER — FENTANYL CITRATE 50 UG/ML
INJECTION, SOLUTION INTRAMUSCULAR; INTRAVENOUS
Status: DISCONTINUED | OUTPATIENT
Start: 2022-02-19 | End: 2022-02-19

## 2022-02-19 RX ORDER — HYDROMORPHONE HYDROCHLORIDE 1 MG/ML
0.2 INJECTION, SOLUTION INTRAMUSCULAR; INTRAVENOUS; SUBCUTANEOUS EVERY 5 MIN PRN
Status: CANCELLED | OUTPATIENT
Start: 2022-02-19

## 2022-02-19 RX ORDER — ONDANSETRON 2 MG/ML
4 INJECTION INTRAMUSCULAR; INTRAVENOUS ONCE AS NEEDED
Status: CANCELLED | OUTPATIENT
Start: 2022-02-19 | End: 2033-07-18

## 2022-02-19 RX ORDER — LIDOCAINE HYDROCHLORIDE 20 MG/ML
INJECTION, SOLUTION EPIDURAL; INFILTRATION; INTRACAUDAL; PERINEURAL
Status: DISCONTINUED | OUTPATIENT
Start: 2022-02-19 | End: 2022-02-19

## 2022-02-19 RX ORDER — MUPIROCIN 20 MG/G
OINTMENT TOPICAL 2 TIMES DAILY
Status: DISPENSED | OUTPATIENT
Start: 2022-02-19 | End: 2022-02-24

## 2022-02-19 RX ORDER — FENTANYL CITRATE 50 UG/ML
25 INJECTION, SOLUTION INTRAMUSCULAR; INTRAVENOUS EVERY 5 MIN PRN
Status: CANCELLED | OUTPATIENT
Start: 2022-02-19

## 2022-02-19 RX ORDER — ALPRAZOLAM 0.5 MG/1
0.5 TABLET ORAL 2 TIMES DAILY PRN
Status: DISCONTINUED | OUTPATIENT
Start: 2022-02-19 | End: 2022-02-27 | Stop reason: HOSPADM

## 2022-02-19 RX ADMIN — ROCURONIUM BROMIDE 50 MG: 10 INJECTION, SOLUTION INTRAVENOUS at 05:02

## 2022-02-19 RX ADMIN — MUPIROCIN: 20 OINTMENT TOPICAL at 09:02

## 2022-02-19 RX ADMIN — Medication 100 MCG: at 05:02

## 2022-02-19 RX ADMIN — SODIUM CHLORIDE, SODIUM GLUCONATE, SODIUM ACETATE, POTASSIUM CHLORIDE, MAGNESIUM CHLORIDE, SODIUM PHOSPHATE, DIBASIC, AND POTASSIUM PHOSPHATE: .53; .5; .37; .037; .03; .012; .00082 INJECTION, SOLUTION INTRAVENOUS at 05:02

## 2022-02-19 RX ADMIN — ATORVASTATIN CALCIUM 40 MG: 20 TABLET, FILM COATED ORAL at 09:02

## 2022-02-19 RX ADMIN — TRAZODONE HYDROCHLORIDE 100 MG: 50 TABLET ORAL at 11:02

## 2022-02-19 RX ADMIN — FENTANYL CITRATE 100 MCG: 50 INJECTION INTRAMUSCULAR; INTRAVENOUS at 05:02

## 2022-02-19 RX ADMIN — DULOXETINE 120 MG: 60 CAPSULE, DELAYED RELEASE ORAL at 09:02

## 2022-02-19 RX ADMIN — NEOSTIGMINE METHYLSULFATE 4 MG: 0.5 INJECTION INTRAVENOUS at 06:02

## 2022-02-19 RX ADMIN — ONDANSETRON 4 MG: 2 INJECTION INTRAMUSCULAR; INTRAVENOUS at 06:02

## 2022-02-19 RX ADMIN — MIDAZOLAM 2 MG: 1 INJECTION INTRAMUSCULAR; INTRAVENOUS at 05:02

## 2022-02-19 RX ADMIN — ACETAMINOPHEN 650 MG: 325 TABLET ORAL at 10:02

## 2022-02-19 RX ADMIN — GLYCOPYRROLATE 0.4 MG: 0.2 INJECTION, SOLUTION INTRAMUSCULAR; INTRAVITREAL at 06:02

## 2022-02-19 RX ADMIN — ACETAMINOPHEN 650 MG: 325 TABLET ORAL at 04:02

## 2022-02-19 RX ADMIN — ALPRAZOLAM 0.5 MG: 0.5 TABLET ORAL at 07:02

## 2022-02-19 RX ADMIN — LIDOCAINE HYDROCHLORIDE 100 MG: 20 INJECTION, SOLUTION EPIDURAL; INFILTRATION; INTRACAUDAL at 05:02

## 2022-02-19 RX ADMIN — ACETAMINOPHEN 650 MG: 325 TABLET ORAL at 03:02

## 2022-02-19 RX ADMIN — ALPRAZOLAM 0.5 MG: 0.5 TABLET ORAL at 09:02

## 2022-02-19 RX ADMIN — ETOMIDATE 24 MG: 2 INJECTION INTRAVENOUS at 05:02

## 2022-02-19 RX ADMIN — NOREPINEPHRINE BITARTRATE 0.02 MCG/KG/MIN: 4 INJECTION, SOLUTION INTRAVENOUS at 03:02

## 2022-02-19 RX ADMIN — SODIUM CHLORIDE: 0.9 INJECTION, SOLUTION INTRAVENOUS at 11:02

## 2022-02-19 RX ADMIN — SODIUM CHLORIDE 25 MG: 9 INJECTION, SOLUTION INTRAVENOUS at 10:02

## 2022-02-19 RX ADMIN — PANTOPRAZOLE SODIUM 40 MG: 40 TABLET, DELAYED RELEASE ORAL at 09:02

## 2022-02-19 RX ADMIN — CONJUGATED ESTROGENS 25 MG: 25 INJECTION, POWDER, LYOPHILIZED, FOR SOLUTION INTRAMUSCULAR; INTRAVENOUS at 01:02

## 2022-02-19 NOTE — CARE UPDATE
Spoke with care team via phone to discuss patient status   Patient with continued vaginal bleeding refractory to IV estrogen. Has now received 4 doses within the past 24 hours with no improvement in bleeding. Requiring pressors to maintain blood pressure.     Given these findings and report of continued vaginal bleeding recommend proceeding with D&C. Will coordinate to have this performed today at [facility].     Tricia Ma MD   OBGYN  PGY-4

## 2022-02-19 NOTE — ANESTHESIA PREPROCEDURE EVALUATION
[facility]  Anesthesia Pre-Operative Evaluation         Patient Name/: Vicky Alvarado, 1965  MRN: [id number]    SUBJECTIVE:     Pre-operative evaluation for Procedure(s) (LRB):  HYSTEROSCOPY, WITH DILATION AND CURETTAGE OF UTERUS (N/A)     2022    Vicky Alvarado is a 56 y.o. female w/ a significant PMHx of HTN, Afib (s/p ablation 2 wk ago, on Eliquis), who presented  for vaginal bleeding. Found to be hypotensive with active vaginal hemorrhage. Failed conservative therapy with IV estrogen. Now to OR for D&C with OBGYN.     Patient now presents for the above procedure(s).    ________________________________________  Results for orders placed in visit on 21    Echo    Interpretation Summary  · The left ventricle is normal in size with concentric remodeling and normal systolic function. The estimated ejection fraction is 60%.  · Normal left ventricular diastolic function.  · Normal right ventricular size with normal right ventricular systolic function.  · Normal central venous pressure (3 mmHg).  · Aortic valve area is 1.63 cm2; peak velocity is 2.63 m/s; mean gradient is 17 mmHg.  · There is mild aortic valve stenosis.  · The estimated ejection fraction is 60%.    ________________________________________    Prev airway:  2022   Method of Intubation: Video Laryngoscopy; Mask Ventilation: Mod Diff - oral; Intubated: Postinduction; Blade: Mcnair #3; Airway Device Size: 7.0; Cuff Inflation: Minimal occlusive pressure; Placement Verified By: Capnometry; Complicating Factors: Oropharyngeal edema or fat, Obesity, Short neck; Intubation Findings: Bilateral breath sounds, Atraumatic/Condition of teeth unchanged; Securment: Lips; Complications: None; Removal Date: 22;  Removal Time: 1119    LDA:        Peripheral IV - Single Lumen 22 1035 20 G Left Forearm (Active)   Site Assessment Clean;Dry;Intact;No redness;No swelling 22 1100   Extremity  Assessment Distal to IV No warmth;No swelling;No redness 02/19/22 1100   Line Status Saline locked 02/19/22 1100   Dressing Status Clean;Dry;Intact 02/19/22 1100   Dressing Intervention Integrity maintained 02/19/22 1100   Dressing Change Due 02/22/22 02/19/22 1100   Site Change Due 02/22/22 02/19/22 1100   Reason Not Rotated Not due 02/19/22 1100   Number of days: 1            Peripheral IV - Single Lumen 02/18/22 1354 20 G Left Antecubital (Active)   Site Assessment Dry;Clean;Intact;No redness;No swelling 02/19/22 1100   Extremity Assessment Distal to IV No swelling;No warmth;No redness;No abnormal discoloration 02/19/22 1100   Line Status Infusing 02/19/22 1100   Dressing Status Clean;Dry;Intact 02/19/22 1100   Dressing Intervention Integrity maintained 02/19/22 1100   Dressing Change Due 02/22/22 02/19/22 1100   Site Change Due 02/22/22 02/19/22 1100   Reason Not Rotated Not due 02/19/22 1100   Number of days: 1       Drips:    sodium chloride 0.9% 100 mL/hr at 02/19/22 1500    NORepinephrine bitartrate-D5W 0.02 mcg/kg/min (02/19/22 1500)       Patient Active Problem List   Diagnosis    Opiate dependence    Opioid dependence in remission    Essential hypertension    Atrial Fibrillation (paroxysmal)    Avascular necrosis of lunate    History of opioid abuse    Severe obesity (BMI >= 40)    Type 2 diabetes mellitus with diabetic polyneuropathy, without long-term current use of insulin    Right carpal tunnel syndrome    Weakness of right hand    ERENDIRA (obstructive sleep apnea)    Depression    Wound dehiscence    Carpal tunnel syndrome    Ulnar neuropathy at elbow of left upper extremity    Range of motion deficit    Edema of hand    Pure hypercholesterolemia    Mild aortic stenosis    Microcytic anemia    Colon cancer screening    Hemorrhagic shock    Vagina bleeding    JHONATAN (acute kidney injury)       Review of patient's allergies indicates:   Allergen Reactions    Flecainide Shortness Of  Breath and Swelling       Current Inpatient Medications:    atorvastatin  40 mg Oral Daily    DULoxetine  120 mg Oral Daily    mupirocin   Nasal BID    pantoprazole  40 mg Oral Daily       Current Facility-Administered Medications on File Prior to Encounter   Medication Dose Route Frequency Provider Last Rate Last Admin    sodium chloride 0.9% bolus 1,000 mL  1,000 mL Intravenous Once Laurie Montgomery NP         Current Outpatient Medications on File Prior to Encounter   Medication Sig Dispense Refill    acetaminophen (TYLENOL) 500 MG tablet Take 2 tablets (1,000 mg) by mouth at onset of pain or headache, may repeat if needed.      ALPRAZolam (XANAX) 0.5 MG tablet Take 1 tablet (0.5 mg total) by mouth 2 (two) times daily as needed for Anxiety. 60 tablet 4    apixaban (ELIQUIS) 5 mg Tab Take 1 tablet (5 mg total) by mouth 2 (two) times daily. 90 tablet 5    atorvastatin (LIPITOR) 40 MG tablet Take 1 tablet (40 mg total) by mouth once daily. 90 tablet 3    b complex vitamins capsule Take 1 capsule by mouth once daily.      colchicine (COLCRYS) 0.6 mg tablet For gout attack: Take TWO tablets by mouth, then, 2 hours later, take ONE additional tablet. Then take 1 tablet twice daily only if still needed for gout pain. (Patient taking differently: For gout attack: Take TWO tablets by mouth, then, 2 hours later, take ONE additional tablet. Then take 1 tablet twice daily only if still needed for gout pain.) 20 tablet 1    DULoxetine (CYMBALTA) 60 MG capsule Take 2 capsules (120 mg total) by mouth once daily. 180 capsule 3    ferrous sulfate (SLOW RELEASE IRON) 142 mg (45 mg iron) TbSR Take 1 tablet by mouth once daily.      furosemide (LASIX) 40 MG tablet Take 1 tablet (40 mg total) by mouth 2 (two) times daily. 60 tablet 11    gluc-shikha-[facility]#3-M-yswk-reymundo-bor 750 mg-644 mg- 30 mg-1 mg Tab Take 1 tablet by mouth once daily.       krill/om-3/dha/epa/phospho/ast (MEGARED OMEGA-3 KRILL OIL ORAL) Take 1 capsule by  mouth once daily.      lisinopriL (PRINIVIL,ZESTRIL) 40 MG tablet Take 1 tablet (40 mg total) by mouth once daily. 90 tablet 3    melatonin 10 mg Tab Take 1-2 tablets by mouth nightly as needed (Sleep).      metoprolol succinate (TOPROL-XL) 50 MG 24 hr tablet Take 1 tablet (50 mg total) by mouth 2 (two) times daily. 180 tablet 3    multivitamin (THERAGRAN) per tablet Take 1 tablet by mouth once daily.      NIFEdipine (PROCARDIA-XL) 90 MG (OSM) 24 hr tablet Take 1 tablet (90 mg total) by mouth once daily. 30 tablet 11    omeprazole (PRILOSEC) 20 MG capsule TAKE 1 CAPSULE BY MOUTH ONCE DAILY 90 capsule 3    polyethylene glycol (MIRALAX) 17 gram PwPk Take by mouth as needed (constipation).      predniSONE (DELTASONE) 10 MG tablet Take 1 tablet by mouth daily (Patient taking differently: Take by mouth daily as needed (alternates with Colchicine).) 10 tablet 0    propafenone (RYTHMOL) 225 MG Tab Take 1 tablet (225 mg total) by mouth every 8 (eight) hours. 90 tablet 11    traZODone (DESYREL) 100 MG tablet Take 1 tablet (100 mg total) by mouth nightly as needed for Insomnia. 90 tablet 3    UNABLE TO FIND Take 1 tablet by mouth once daily. Trino's Leg Cramps      pantoprazole (PROTONIX) 40 MG tablet Take 1 tablet (40 mg total) by mouth once daily. (Patient not taking: Reported on 2/18/2022) 30 tablet 0    potassium chloride SA (K-DUR,KLOR-CON) 20 MEQ tablet Take 1 tablet (20 mEq total) by mouth once daily. 30 tablet 11       Past Surgical History:   Procedure Laterality Date    ABLATION OF ARRHYTHMOGENIC FOCUS FOR ATRIAL FIBRILLATION N/A 7/20/2020    Procedure: Ablation atrial fibrillation;  Surgeon: Gabriel Hawley MD;  Location: [facility] EP LAB;  Service: Cardiology;  Laterality: N/A;  afib, PVI, RFA, REENA, SHADY, anes, MB, 3 Prep    ABLATION OF ARRHYTHMOGENIC FOCUS FOR ATRIAL FIBRILLATION N/A 1/24/2022    Procedure: Ablation atrial fibrillation;  Surgeon: Gabriel Hawley MD;  Location: [facility] EP LAB;   Service: Cardiology;  Laterality: N/A;  afib/afl, PVI (re-do)/CTI, RFA, REENA (cx if SR), SHADY, anes, MB, 3 Prep    APPLICATION OF WOUND VACUUM-ASSISTED CLOSURE DEVICE Left 8/3/2020    Procedure: APPLICATION, WOUND VAC;  Surgeon: SEMAJ Márquez III, MD;  Location: [facility] OR 2ND FLR;  Service: Peripheral Vascular;  Laterality: Left;    APPLICATION OF WOUND VACUUM-ASSISTED CLOSURE DEVICE Left 8/6/2020    Procedure: APPLICATION, WOUND VAC;  Surgeon: SEMAJ Márquez III, MD;  Location: [facility] OR 2ND FLR;  Service: Peripheral Vascular;  Laterality: Left;    CARPAL TUNNEL RELEASE Right 6/10/2020    Procedure: RELEASE, CARPAL TUNNEL - RIGHT;  Surgeon: Adelaida Hall MD;  Location: [facility];  Service: Orthopedics;  Laterality: Right;  GENERAL AND REGIONAL    CARPAL TUNNEL RELEASE Left 5/5/2021    Procedure: RELEASE, CARPAL TUNNEL, LEFT;  Surgeon: Adelaida Hall MD;  Location: [facility] OR;  Service: Orthopedics;  Laterality: Left;  GENERAL & REGIONAL IN PACU    CLOSURE OF WOUND Left 8/6/2020    Procedure: CLOSURE, WOUND;  Surgeon: SEMAJ Márquez III, MD;  [facility];  Service: Peripheral Vascular;  Laterality: Left;  Complex    COLONOSCOPY N/A 8/17/2021    Procedure: COLONOSCOPY Suprep;  Surgeon: Loco Deluca MD;  Location: [facility];  Service: Endoscopy;  Laterality: N/A;    ESOPHAGOGASTRODUODENOSCOPY N/A 8/17/2021    Procedure: EGD (ESOPHAGOGASTRODUODENOSCOPY);  Surgeon: Loco Deluca MD;  Location: [facility];  Service: Endoscopy;  Laterality: N/A;  Patient is schedule to have her Covid test on 08/14/2021 at the [facility] @ 9:30am. [place].    EXPLORATION OF FEMORAL ARTERY Left 7/21/2020    Procedure: EXPLORATION, ARTERY, FEMORAL;  Surgeon: SEMAJ Márquez III, MD;  Location: [facility] OR 2ND FLR;  Service: Peripheral Vascular;  Laterality: Left;  1. Urgent Direct repair, L SFA branch laceration    FOOT SURGERY      TREATMENT OF CARDIAC ARRHYTHMIA N/A 1/29/2020    Procedure:  CARDIOVERSION;  Surgeon: Gabriel Hawley MD;  Location: [facility] EP LAB;  Service: Cardiology;  Laterality: N/A;  af, demi, dccv, anes, mb, 345    TREATMENT OF CARDIAC ARRHYTHMIA  1/24/2022    Procedure: Cardioversion or Defibrillation;  Surgeon: Gabriel Hawley MD;  Location: [facility] EP LAB;  Service: Cardiology;;    WOUND DEBRIDEMENT Left 8/6/2020    Procedure: DEBRIDEMENT, WOUND;  Surgeon: SEMAJ Márquez III, MD;  Location: [facility] OR [address] [place] [address];  Service: Peripheral Vascular;  Laterality: Left;       Social History:  Tobacco Use: Medium Risk    Smoking Tobacco Use: Former Smoker    Smokeless Tobacco Use: Never Used       Alcohol Use: Not on file       OBJECTIVE:     Vital Signs Range:  BMI Readings from Last 1 Encounters:   02/18/22 50.84 kg/m²       Temp:  [36.6 °C (97.9 °F)-37.2 °C (98.9 °F)]   Pulse:  [65-93]   Resp:  [7-35]   BP: ()/(35-59)   SpO2:  [91 %-98 %]        Significant Labs:        Component Value Date/Time    WBC 11.40 02/19/2022 1209    HGB 8.0 (L) 02/19/2022 1209    HCT 24.5 (L) 02/19/2022 1209    HCT 36 01/24/2022 0807     02/19/2022 1209     02/19/2022 0330    K 4.1 02/19/2022 0330    CL 99 02/19/2022 0330    CO2 26 02/19/2022 0330     (H) 02/19/2022 0330    BUN 18 02/19/2022 0330    CREATININE 2.2 (H) 02/19/2022 0330    MG 1.9 01/30/2020 0326    CALCIUM 8.7 02/19/2022 0330    ALBUMIN 2.7 (L) 02/19/2022 0330    PROT 6.0 02/19/2022 0330    ALKPHOS 129 02/19/2022 0330    BILITOT 1.1 (H) 02/19/2022 0330     (H) 02/19/2022 0330    ALT 20 02/19/2022 0330    INR 1.1 02/18/2022 1042    HGBA1C 5.8 (H) 02/18/2022 1827        Please see Results Review for additional labs.     Diagnostic Studies: No relevant studies.    EKG:   Results for orders placed or performed during the hospital encounter of 02/18/22   EKG 12-lead    Collection Time: 02/18/22  9:45 AM    Narrative    Test Reason : R42,    Vent. Rate : 074 BPM     Atrial Rate : 074 BPM     P-R Int : 160 ms           QRS Dur : 086 ms      QT Int : 398 ms       P-R-T Axes : 054 040 035 degrees     QTc Int : 441 ms    Normal sinus rhythm  Normal ECG  No previous ECGs available  Confirmed by Ramy MARTÍNEZ MD ([id number]) on 2/18/2022 2:31:23 PM    Referred By: ALFIE   SELF           Confirmed By:Ramy MARTÍNEZ MD       ECHO:  See subjective, if available.      ASSESSMENT/PLAN:           Pre-op Assessment    I have reviewed the Patient Summary Reports.     I have reviewed the Nursing Notes.    I have reviewed the Medications.     Review of Systems  Anesthesia Hx:  No problems with previous Anesthesia Denies Hx of Anesthetic complications  History of prior surgery of interest to airway management or planning: Denies Family Hx of Anesthesia complications.   Denies Personal Hx of Anesthesia complications.   Social:  Former Smoker, Social Alcohol Use    Hematology/Oncology:         -- Anemia (hemorrhagic ): --  Denies Cancer in past history:    Cardiovascular:   Hypertension Denies CAD.        ECG has been reviewed.    Pulmonary:   Sleep Apnea    Renal/:   Chronic Renal Disease, ARF    Hepatic/GI:   GERD    Neurological:   Neuromuscular Disease,    Endocrine:   Diabetes, well controlled    Psych:   Psychiatric History depression             Anesthesia Plan  Type of Anesthesia, risks & benefits discussed:    Anesthesia Type: Gen ETT  Intra-op Monitoring Plan: Standard ASA Monitors and Art Line  Post Op Pain Control Plan: multimodal analgesia and IV/PO Opioids PRN  Induction:  IV  Airway Plan: Direct, Post-Induction  Informed Consent: Informed consent signed with the Patient representative and all parties understand the risks and agree with anesthesia plan.  All questions answered.   ASA Score: 3 Emergent  Day of Surgery Review of History & Physical: H&P update referred to the surgeon.     Ready For Surgery From Anesthesia Perspective.     .

## 2022-02-19 NOTE — H&P
[facility] - Emergency Dept  Critical Care Medicine  History & Physical    Patient Name: Vicky Alvarado  MRN: [id number]  Admission Date: 2/18/2022  Hospital Length of Stay: 0 days  Code Status: Prior  Attending Physician: Armin Ochoa MD   Primary Care Provider: Gordy Cordero MD   Principal Problem: Hemorrhagic shock    Subjective:     HPI:  Vicky Alvarado is a 56 year old female with HTN and history of atrial fibrillation s/p ablation 2 weeks ago (still on eliquis) who presented to [facility] ED on 2/18/22 complaining of vaginal bleeding for past 2 days. Patient reports she started noticed significant vaginal bleeding and pass clots on 2/16. Described some clots as the size of oranges. Reports associated abdominal cramping with vaginal bleeding. She is having to wear two large menstrual pads and changing them frequently. Standing up causes her to bleed more. Patient mentions that she has not menstruated in the last 5 years. Other associated symptoms included fatigue, dizziness, lightheadedness and NAPIER. Denies fever, chills, cough, chest pain, or sick contacts. Reports taking her last dose of eliquis on evening of 2/17 and took her antihypertensive medications this morning.     In the ED, patient found to be hypotensive with active vaginal hemorrhage. Hemoglobin at that time was 10.7.1 unit of emergency blood given. Gynecology consulted and recommending IV estrogen. Critical care medicine consulted for hemorrhagic shock.       Hospital/ICU Course:  No notes on file     Past Medical History:   Diagnosis Date    Anticoagulant long-term use     Arthritis     Atrial fibrillation     cardioversion    Atrial fibrillation with RVR     Avascular necrosis     L hand    CHF (congestive heart failure)     Depression     Encounter for blood transfusion 07/22/2020    GERD (gastroesophageal reflux disease)     Hx of psychiatric care     Hypertension     Obese     Psychiatric problem     Sleep apnea        Past  Surgical History:   Procedure Laterality Date    ABLATION OF ARRHYTHMOGENIC FOCUS FOR ATRIAL FIBRILLATION N/A 7/20/2020    Procedure: Ablation atrial fibrillation;  Surgeon: Gabriel Hawley MD;  Location: [facility] EP LAB;  Service: Cardiology;  Laterality: N/A;  afib, PVI, RFA, REENA, SHADY, anes, MB, 3 Prep    ABLATION OF ARRHYTHMOGENIC FOCUS FOR ATRIAL FIBRILLATION N/A 1/24/2022    Procedure: Ablation atrial fibrillation;  Surgeon: Gabriel Hawley MD;  Location: [facility] EP LAB;  Service: Cardiology;  Laterality: N/A;  afib/afl, PVI (re-do)/CTI, RFA, REENA (cx if SR), SHADY, anes, MB, 3 Prep    APPLICATION OF WOUND VACUUM-ASSISTED CLOSURE DEVICE Left 8/3/2020    Procedure: APPLICATION, WOUND VAC;  Surgeon: SEMAJ Márquez III, MD;  Location: [facility] OR 2ND FLR;  Service: Peripheral Vascular;  Laterality: Left;    APPLICATION OF WOUND VACUUM-ASSISTED CLOSURE DEVICE Left 8/6/2020    Procedure: APPLICATION, WOUND VAC;  Surgeon: SEMAJ Márquez III, MD;  Location: [facility] OR 2ND FLR;  Service: Peripheral Vascular;  Laterality: Left;    CARPAL TUNNEL RELEASE Right 6/10/2020    Procedure: RELEASE, CARPAL TUNNEL - RIGHT;  Surgeon: Adelaida Hall MD;  Location: [facility];  Service: Orthopedics;  Laterality: Right;  GENERAL AND REGIONAL    CARPAL TUNNEL RELEASE Left 5/5/2021    Procedure: RELEASE, CARPAL TUNNEL, LEFT;  Surgeon: Adelaida Hall MD;  Location: [facility];  Service: Orthopedics;  Laterality: Left;  GENERAL & REGIONAL IN PACU    CLOSURE OF WOUND Left 8/6/2020    Procedure: CLOSURE, WOUND;  Surgeon: SEMAJ Márquez III, MD;  Location: [facility] OR 2ND FLR;  Service: Peripheral Vascular;  Laterality: Left;  Complex    COLONOSCOPY N/A 8/17/2021    Procedure: COLONOSCOPY Suprep;  Surgeon: Loco Deluca MD;  Location: [facility];  Service: Endoscopy;  Laterality: N/A;    ESOPHAGOGASTRODUODENOSCOPY N/A 8/17/2021    Procedure: EGD (ESOPHAGOGASTRODUODENOSCOPY);  Surgeon: Loco Deluca MD;  Location:  [facility] ENDO;  Service: Endoscopy;  Laterality: N/A;  Patient is schedule [facility] @ 9:30am. [place].    EXPLORATION OF FEMORAL ARTERY Left 2020    Procedure: EXPLORATION, ARTERY, FEMORAL;  Surgeon: SEMAJ Márquez III, MD;  Location: [facility] OR [address] [place] [address];  Service: Peripheral Vascular;  Laterality: Left;  1. Urgent Direct repair, L SFA branch laceration    FOOT SURGERY      TREATMENT OF CARDIAC ARRHYTHMIA N/A 2020    Procedure: CARDIOVERSION;  Surgeon: Gabriel Hawley MD;  Location: [facility] EP LAB;  Service: Cardiology;  Laterality: N/A;  af, demi, dccv, anes, mb, 345    TREATMENT OF CARDIAC ARRHYTHMIA  2022    Procedure: Cardioversion or Defibrillation;  Surgeon: Gabriel Hawley MD;  Location: [facility] EP LAB;  Service: Cardiology;;    WOUND DEBRIDEMENT Left 2020    Procedure: DEBRIDEMENT, WOUND;  Surgeon: SEMAJ Márquez III, MD;  Location: [facility] OR [address] [place] [address];  Service: Peripheral Vascular;  Laterality: Left;       Review of patient's allergies indicates:   Allergen Reactions    Flecainide Shortness Of Breath and Swelling       Family History     Problem Relation (Age of Onset)    Cataracts Mother    Diabetes Father    Hypertension Mother, Father, Sister, Brother    Thyroid disease Mother        Tobacco Use    Smoking status: Former Smoker     Types: Cigarettes     Quit date: 2019     Years since quittin.6    Smokeless tobacco: Never Used   Substance and Sexual Activity    Alcohol use: No    Drug use: No    Sexual activity: Not on file      Review of Systems   Constitutional: Positive for fatigue. Negative for chills and fever.   HENT: Negative for congestion and sinus pain.    Eyes: Negative for photophobia and visual disturbance.   Respiratory: Positive for shortness of breath. Negative for cough.    Cardiovascular: Negative for chest pain, palpitations and leg swelling.   Gastrointestinal: Positive for abdominal pain. Negative for blood  in stool, nausea and vomiting.   Endocrine: Negative for cold intolerance and heat intolerance.   Genitourinary: Positive for vaginal bleeding. Negative for frequency and urgency.   Musculoskeletal: Positive for back pain. Negative for arthralgias and neck stiffness.   Skin: Negative for pallor and rash.   Neurological: Positive for dizziness and light-headedness.   Hematological: Does not bruise/bleed easily.   Psychiatric/Behavioral: Negative for decreased concentration and dysphoric mood.     Objective:     Vital Signs (Most Recent):  Temp: 98.1 °F (36.7 °C) (02/18/22 1745)  Pulse: 66 (02/18/22 1745)  Resp: 16 (02/18/22 1745)  BP: (!) 85/45 (02/18/22 1745)  SpO2: (!) 93 % (02/18/22 1745) Vital Signs (24h Range):  Temp:  [97.6 °F (36.4 °C)-98.2 °F (36.8 °C)] 98.1 °F (36.7 °C)  Pulse:  [60-76] 66  Resp:  [16-20] 16  SpO2:  [92 %-98 %] 93 %  BP: ()/(45-67) 85/45   Weight: (!) 142.9 kg (315 lb)  Body mass index is 50.84 kg/m².      Intake/Output Summary (Last 24 hours) at 2/18/2022 1752  Last data filed at 2/18/2022 1439  Gross per 24 hour   Intake 1251 ml   Output --   Net 1251 ml       Physical Exam  Vitals reviewed.   Constitutional:       Appearance: She is well-developed and well-nourished. She is morbidly obese.   HENT:      Head: Normocephalic and atraumatic.   Eyes:      Extraocular Movements: EOM normal.      Pupils: Pupils are equal, round, and reactive to light.   Neck:      Thyroid: No thyromegaly.      Trachea: No tracheal deviation.   Cardiovascular:      Rate and Rhythm: Normal rate and regular rhythm.      Heart sounds: Normal heart sounds. No murmur heard.  No friction rub. No gallop.    Pulmonary:      Effort: Pulmonary effort is normal.      Breath sounds: Normal breath sounds.   Abdominal:      General: Bowel sounds are normal.      Palpations: Abdomen is soft.      Tenderness: There is abdominal tenderness in the suprapubic area.   Musculoskeletal:         General: Normal range of motion.       Right lower leg: No edema.      Left lower leg: No edema.   Skin:     General: Skin is warm and dry.   Neurological:      Mental Status: She is alert and oriented to person, place, and time.      Sensory: No sensory deficit.   Psychiatric:         Behavior: Behavior is cooperative.         Vents: on room air     Lines/Drains/Airways     Peripheral Intravenous Line                 Peripheral IV - Single Lumen 02/18/22 1035 20 G Left Forearm <1 day         Peripheral IV - Single Lumen 02/18/22 1354 20 G Left Antecubital <1 day              Significant Labs:    CBC/Anemia Profile:  Recent Labs   Lab 02/18/22  1035 02/18/22  1356   WBC 11.90  --    HGB 10.7* 9.9*   HCT 33.2*  --      --    *  --    RDW 14.2  --         Chemistries:  Recent Labs   Lab 02/18/22  1035   *   K 5.4*   CL 96   CO2 25   BUN 12   CREATININE 1.5*   CALCIUM 9.9   ALBUMIN 3.4*   PROT 8.0   BILITOT 0.9   ALKPHOS 173*   ALT 29   *       All pertinent labs within the past 24 hours have been reviewed.    Significant Imaging: I have reviewed all pertinent imaging results/findings within the past 24 hours.    Assessment/Plan:     Cardiac/Vascular  Pure hypercholesterolemia  Continue statin    Atrial Fibrillation (paroxysmal)  Hx of afib s/p 2 cardioversions, most recent 2 weeks ago. Follows with Dr. Hawley with EP as an outpatient. Remains on elquis.     --NSR on admission  --holding metoprolol and propafenone due to shock. Restart when clinically appropriate  --hold anticoagulation until vaginal bleeding resolved    Essential hypertension  Holding home antihypertensives in setting of shock    Renal/  JHONATAN (acute kidney injury)  Baseline sCr 0.9.   sCr 1.5 on admission  Likely prerenal in setting of hypotension    --strict I/Os  --monitor UOP  --Suspect improvement with fluid resuscitation.  --avoid nephrotoxic agents / renally dose medications    Vagina bleeding  See hemorrhagic shock    Endocrine  Severe obesity (BMI  >= 40)  Noted    Other  * Hemorrhagic shock  Vicky Alvarado is a 56 year old female with history of atrial fibrillation on anticoagulation who presented with hemorrhagic shock 2/2 vaginal bleeding. Hg on admission 10.4. 1 unit of emergency blood given due to hypotension. Transvaginal US concerned for endometrial wall thickening and partially calcified fibroid measuring approximately 2.9 x 2.3 x 3.1 cm.     --Gynecology consulted, follow up recs  --Recommend IV estrogen  --CBC q8  --continue to hold anticoagulation  --Transfuse for hemoglobin >7    Discussed with Dr. Ochoa. Patient and sister updated on plan of care.     Critical Care Time: 50 minutes  Critical secondary to Patient has a condition that poses threat to life and bodily function: hemorrhagic shock     Critical care was time spent personally by me on the following activities: development of treatment plan with patient or surrogate and bedside caregivers, discussions with consultants, evaluation of patient's response to treatment, examination of patient, ordering and performing treatments and interventions, ordering and review of laboratory studies, ordering and review of radiographic studies, pulse oximetry, re-evaluation of patient's condition. This critical care time did not overlap with that of any other provider or involve time for any procedures.     Loraine Antonio PA-C  Critical Care Medicine  [facility] Emergency Dept

## 2022-02-19 NOTE — PLAN OF CARE
SICU PLAN OF CARE NOTE    Dx: Hemorrhagic shock    Shift Events: Admit from ED 0320- see note. Levo titrated to maintain MAP >65. PRN incontinence care and absorbent pad changed. PRN tylenol administered.    Goals of Care: MAP >65    Neuro: AAO x4, Follows Commands, and Moves All Extremities    Cardiac: 70-80 NSR    Vital Signs: BP (!) 98/53   Pulse 76   Temp 98.6 °F (37 °C) (Oral)   Resp 16   Wt (!) 142.9 kg (315 lb)   LMP 06/08/2015   SpO2 97%   Breastfeeding No   BMI 50.84 kg/m²     Respiratory: Nasal Cannula 2L    Diet: NPO and Sips with Meds    Gtts: Norepinephrine    Urine Output: Voids Spontaneously 1 urine occurrence/ shift     Labs/Accuchecks: labs trended and reviewed. Awaiting morning CBC.    Skin: Skin warm and dry. No breakdown to sacrum. PRN incontinence care provided. Absorbent pads changed frequently due to bleeding. Bed plugged in and working. Pt repositioned independently. Call light within reach.     POC reviewed with pt and sister. Answered all questions. See flowsheet for full assessment.

## 2022-02-19 NOTE — NURSING
Pt arrived to SICU by bed and ER nurse. Attached to wall monitor. Vitals stable. Attached to oxygen at 2L, sating >90%. New bag of Levo hung. Labs collected. Critical team notified. Sister at bedside. Answered all questions.

## 2022-02-19 NOTE — ASSESSMENT & PLAN NOTE
Vicky Alvarado is a 56 year old female with history of atrial fibrillation on anticoagulation who presented with hemorrhagic shock 2/2 vaginal bleeding. Hg on admission 10.4. 1 unit of emergency blood given due to hypotension. Transvaginal US concerned for endometrial wall thickening and partially calcified fibroid measuring approximately 2.9 x 2.3 x 3.1 cm.     --Gynecology consulted, follow up recs  --Recommend IV estrogen  --CBC q8  --continue to hold anticoagulation  --Transfuse for hemoglobin >7  --H/H stable after 1 unit PRBC transfusion 2/18  --Wean norepinephrine infusion as tolerated

## 2022-02-19 NOTE — SUBJECTIVE & OBJECTIVE
Interval History/Significant Events:  Vaginal bleeding appears to be decreasing.  H/H stable after 1 unit PRBC transfusion.  Was on low-dose norepinephrine infusion but stopped this morning.  Had a headache and took some Tylenol.  Getting 1 L normal saline infusion.    Review of Systems   Constitutional:  Positive for fatigue.   Respiratory:  Negative for shortness of breath.    Cardiovascular:  Negative for chest pain.   Neurological:  Positive for weakness.   All other systems reviewed and are negative.  Objective:     Vital Signs (Most Recent):  Temp: 98.6 °F (37 °C) (02/19/22 0326)  Pulse: 78 (02/19/22 0723)  Resp: 20 (02/19/22 0723)  BP: (!) 98/53 (02/19/22 0630)  SpO2: 95 % (02/19/22 0723)   Vital Signs (24h Range):  Temp:  [97.6 °F (36.4 °C)-98.6 °F (37 °C)] 98.6 °F (37 °C)  Pulse:  [60-81] 78  Resp:  [7-35] 20  SpO2:  [92 %-98 %] 95 %  BP: ()/(45-67) 98/53   Weight: (!) 142.9 kg (315 lb)  Body mass index is 50.84 kg/m².      Intake/Output Summary (Last 24 hours) at 2/19/2022 0736  Last data filed at 2/19/2022 0600  Gross per 24 hour   Intake 1385.35 ml   Output --   Net 1385.35 ml       Physical Exam  Vitals and nursing note reviewed.   Constitutional:       General: She is not in acute distress.     Appearance: Normal appearance. She is obese. She is not ill-appearing, toxic-appearing or diaphoretic.   HENT:      Head: Normocephalic and atraumatic.      Right Ear: External ear normal.      Left Ear: External ear normal.      Nose: Nose normal.   Cardiovascular:      Rate and Rhythm: Normal rate and regular rhythm.      Pulses: Normal pulses.      Heart sounds: Normal heart sounds. No murmur heard.    No friction rub. No gallop.   Pulmonary:      Effort: Pulmonary effort is normal. No respiratory distress.      Breath sounds: Normal breath sounds. No wheezing, rhonchi or rales.   Abdominal:      General: Abdomen is flat. Bowel sounds are normal. There is no distension.      Palpations: Abdomen is soft.       Tenderness: There is no abdominal tenderness. There is no guarding or rebound.   Musculoskeletal:         General: No swelling. Normal range of motion.   Skin:     General: Skin is warm and dry.      Coloration: Skin is not jaundiced.   Neurological:      General: No focal deficit present.      Mental Status: She is alert and oriented to person, place, and time. Mental status is at baseline.   Psychiatric:         Mood and Affect: Mood normal.         Behavior: Behavior normal.         Thought Content: Thought content normal.         Judgment: Judgment normal.       Vents:     Lines/Drains/Airways       Peripheral Intravenous Line  Duration                  Peripheral IV - Single Lumen 02/18/22 1035 20 G Left Forearm <1 day         Peripheral IV - Single Lumen 02/18/22 1354 20 G Left Antecubital <1 day                  Significant Labs:    CBC/Anemia Profile:  Recent Labs   Lab 02/18/22  1714 02/19/22  0103 02/19/22  0554   WBC 11.57 11.06 11.57   HGB 9.7* 9.3* 9.3*   HCT 30.0* 29.6* 28.0*    186 190   * 104* 100*   RDW 14.8* 15.0* 15.0*        Chemistries:  Recent Labs   Lab 02/18/22  1035 02/18/22  1829 02/19/22  0330   * 137 139   K 5.4* 4.7 4.1   CL 96 101 99   CO2 25 23 26   BUN 12 13 18   CREATININE 1.5* 1.9* 2.2*   CALCIUM 9.9 9.3 8.7   ALBUMIN 3.4* 3.0* 2.7*   PROT 8.0 6.6 6.0   BILITOT 0.9 1.4* 1.1*   ALKPHOS 173* 144* 129   ALT 29 22 20   * 135* 107*       All pertinent labs within the past 24 hours have been reviewed.    Significant Imaging:  I have reviewed all pertinent imaging results/findings within the past 24 hours.

## 2022-02-19 NOTE — ASSESSMENT & PLAN NOTE
Vicky Alvarado is a 56 year old female with history of atrial fibrillation on anticoagulation who presented with hemorrhagic shock 2/2 vaginal bleeding. Hg on admission 10.4. 1 unit of emergency blood given due to hypotension. Transvaginal US concerned for endometrial wall thickening and partially calcified fibroid measuring approximately 2.9 x 2.3 x 3.1 cm.     --Gynecology consulted, follow up recs  --Recommend IV estrogen  --CBC q8  --continue to hold anticoagulation  --Transfuse for hemoglobin >7

## 2022-02-19 NOTE — ASSESSMENT & PLAN NOTE
Baseline creatinine appears to be around 0.9 mg/dL  · Creatinine on presentation was 1.9 mg/dL; normal potassium   · Creatinine this morning was slightly worsened at 2.2 mg/dL  · Likely prerenal in the setting of hemorrhage versus ATN in the setting of hypertension  · Urinalysis with 2+ protein and 2+ occult blood  · Will give trial of IV fluids  · Avoid nephrotoxins  · Recheck renal function in morning

## 2022-02-19 NOTE — PROGRESS NOTES
[facility]  Critical Care Medicine  Progress Note    Patient Name: Vicky Alvarado  MRN: [id number]  Admission Date: 2/18/2022  Hospital Length of Stay: 1 days  Code Status: Prior  Attending Provider: Armin Ochoa MD  Primary Care Provider: Gordy Cordero MD   Principal Problem: Hemorrhagic shock    Subjective:     HPI:  Vicky Alvarado is a 56 year old female with HTN and history of atrial fibrillation s/p ablation 2 weeks ago (still on eliquis) who presented to [facility] [place] [place] ED on 2/18/22 complaining of vaginal bleeding for past 2 days. Patient reports she started noticed significant vaginal bleeding and pass clots on 2/16. Described some clots as the size of oranges. Reports associated abdominal cramping with vaginal bleeding. She is having to wear two large menstrual pads and changing them frequently. Standing up causes her to bleed more. Patient mentions that she has not menstruated in the last 5 years. Other associated symptoms included fatigue, dizziness, lightheadedness and NAPIER. Denies fever, chills, cough, chest pain, or sick contacts. Reports taking her last dose of eliquis on evening of 2/17 and took her antihypertensive medications this morning.     In the ED, patient found to be hypotensive with active vaginal hemorrhage. Hemoglobin at that time was 10.7.1 unit of emergency blood given. Gynecology consulted and recommending IV estrogen. Critical care medicine consulted for hemorrhagic shock.       Hospital/ICU Course:  2/18:  Patient was admitted for hemorrhagic shock due to vaginal bleeding was transfused 1 unit PRBCs.  Also started on low-dose norepinephrine infusion.  Gynecology consulted and started estrogen  2/19:  Was on low doses of norepinephrine infusion this morning but was stopped.  Stable H/H. Continued vaginal bleeding.  Start trial 1 L normal saline infusion for acute renal failure and shock.      Interval History/Significant Events:  Vaginal bleeding appears to be  decreasing.  H/H stable after 1 unit PRBC transfusion.  Was on low-dose norepinephrine infusion but stopped this morning.  Had a headache and took some Tylenol.  Getting 1 L normal saline infusion.    Review of Systems   Constitutional:  Positive for fatigue.   Respiratory:  Negative for shortness of breath.    Cardiovascular:  Negative for chest pain.   Neurological:  Positive for weakness.   All other systems reviewed and are negative.  Objective:     Vital Signs (Most Recent):  Temp: 98.6 °F (37 °C) (02/19/22 0326)  Pulse: 78 (02/19/22 0723)  Resp: 20 (02/19/22 0723)  BP: (!) 98/53 (02/19/22 0630)  SpO2: 95 % (02/19/22 0723)   Vital Signs (24h Range):  Temp:  [97.6 °F (36.4 °C)-98.6 °F (37 °C)] 98.6 °F (37 °C)  Pulse:  [60-81] 78  Resp:  [7-35] 20  SpO2:  [92 %-98 %] 95 %  BP: ()/(45-67) 98/53   Weight: (!) 142.9 kg (315 lb)  Body mass index is 50.84 kg/m².      Intake/Output Summary (Last 24 hours) at 2/19/2022 0736  Last data filed at 2/19/2022 0600  Gross per 24 hour   Intake 1385.35 ml   Output --   Net 1385.35 ml       Physical Exam  Vitals and nursing note reviewed.   Constitutional:       General: She is not in acute distress.     Appearance: Normal appearance. She is obese. She is not ill-appearing, toxic-appearing or diaphoretic.   HENT:      Head: Normocephalic and atraumatic.      Right Ear: External ear normal.      Left Ear: External ear normal.      Nose: Nose normal.   Cardiovascular:      Rate and Rhythm: Normal rate and regular rhythm.      Pulses: Normal pulses.      Heart sounds: Normal heart sounds. No murmur heard.    No friction rub. No gallop.   Pulmonary:      Effort: Pulmonary effort is normal. No respiratory distress.      Breath sounds: Normal breath sounds. No wheezing, rhonchi or rales.   Abdominal:      General: Abdomen is flat. Bowel sounds are normal. There is no distension.      Palpations: Abdomen is soft.      Tenderness: There is no abdominal tenderness. There is no  guarding or rebound.   Musculoskeletal:         General: No swelling. Normal range of motion.   Skin:     General: Skin is warm and dry.      Coloration: Skin is not jaundiced.   Neurological:      General: No focal deficit present.      Mental Status: She is alert and oriented to person, place, and time. Mental status is at baseline.   Psychiatric:         Mood and Affect: Mood normal.         Behavior: Behavior normal.         Thought Content: Thought content normal.         Judgment: Judgment normal.       Vents:     Lines/Drains/Airways       Peripheral Intravenous Line  Duration                  Peripheral IV - Single Lumen 02/18/22 1035 20 G Left Forearm <1 day         Peripheral IV - Single Lumen 02/18/22 1354 20 G Left Antecubital <1 day                  Significant Labs:    CBC/Anemia Profile:  Recent Labs   Lab 02/18/22  1714 02/19/22  0103 02/19/22  0554   WBC 11.57 11.06 11.57   HGB 9.7* 9.3* 9.3*   HCT 30.0* 29.6* 28.0*    186 190   * 104* 100*   RDW 14.8* 15.0* 15.0*        Chemistries:  Recent Labs   Lab 02/18/22  1035 02/18/22  1829 02/19/22  0330   * 137 139   K 5.4* 4.7 4.1   CL 96 101 99   CO2 25 23 26   BUN 12 13 18   CREATININE 1.5* 1.9* 2.2*   CALCIUM 9.9 9.3 8.7   ALBUMIN 3.4* 3.0* 2.7*   PROT 8.0 6.6 6.0   BILITOT 0.9 1.4* 1.1*   ALKPHOS 173* 144* 129   ALT 29 22 20   * 135* 107*       All pertinent labs within the past 24 hours have been reviewed.    Significant Imaging:  I have reviewed all pertinent imaging results/findings within the past 24 hours.      ABG  No results for input(s): PH, PO2, PCO2, HCO3, BE in the last 168 hours.  Assessment/Plan:     Cardiac/Vascular  Pure hypercholesterolemia  Continue statin    Atrial Fibrillation (paroxysmal)  Hx of afib s/p 2 cardioversions, most recent 2 weeks ago. Follows with Dr. Hawley with EP as an outpatient. Remains on elquis.     --NSR on admission  --holding metoprolol and propafenone due to shock. Restart when  clinically appropriate  --hold anticoagulation until vaginal bleeding resolved    Essential hypertension  Holding home antihypertensives in setting of shock    Renal/  JHONATAN (acute kidney injury)  Baseline creatinine appears to be around 0.9 mg/dL  · Creatinine on presentation was 1.9 mg/dL; normal potassium   · Creatinine this morning was slightly worsened at 2.2 mg/dL  · Likely prerenal in the setting of hemorrhage versus ATN in the setting of hypertension  · Urinalysis with 2+ protein and 2+ occult blood  · Will give trial of IV fluids  · Avoid nephrotoxins  · Recheck renal function in morning    Vagina bleeding  See hemorrhagic shock    Endocrine  Severe obesity (BMI >= 40)  Noted    Other  * Hemorrhagic shock  Vicky Alvarado is a 56 year old female with history of atrial fibrillation on anticoagulation who presented with hemorrhagic shock 2/2 vaginal bleeding. Hg on admission 10.4. 1 unit of emergency blood given due to hypotension. Transvaginal US concerned for endometrial wall thickening and partially calcified fibroid measuring approximately 2.9 x 2.3 x 3.1 cm.     --Gynecology consulted, follow up recs  --Recommend IV estrogen  --CBC q8  --continue to hold anticoagulation  --Transfuse for hemoglobin >7  --H/H stable after 1 unit PRBC transfusion 2/18  --Wean norepinephrine infusion as tolerated        Critical Care Time: 35 minutes  Critical secondary to Patient has a condition that poses threat to life and bodily function:  Hemorrhagic shock      Critical care was time spent personally by me on the following activities: development of treatment plan with patient or surrogate and bedside caregivers, discussions with consultants, evaluation of patient's response to treatment, examination of patient, ordering and performing treatments and interventions, ordering and review of laboratory studies, ordering and review of radiographic studies, pulse oximetry, re-evaluation of patient's condition. This critical  care time did not overlap with that of any other provider or involve time for any procedures.           Armin Ochoa M.D.  Department of Pulmonary Medicine  [facility]        N.B.: Portions of this note was dictated using M*Modal Fluency Direct--there may be voice recognition errors occasionally missed on review.

## 2022-02-19 NOTE — ASSESSMENT & PLAN NOTE
Baseline sCr 0.9.   sCr 1.5 on admission  Likely prerenal in setting of hypotension    --strict I/Os  --monitor UOP  --Suspect improvement with fluid resuscitation.  --avoid nephrotoxic agents / renally dose medications

## 2022-02-19 NOTE — ANESTHESIA PROCEDURE NOTES
Intubation    Date/Time: 2/19/2022 5:44 PM  Performed by: Ambar Cabral CRNA  Authorized by: Noah Bailon MD     Intubation:     Induction:  Intravenous    Intubated:  Postinduction    Mask Ventilation:  Easy mask    Attempts:  1    Attempted By:  CRNA    Method of Intubation:  Direct    Blade:  Mcnair 3    Laryngeal View Grade: Grade I - full view of cords      Difficult Airway Encountered?: No      Complications:  None    Airway Device:  Oral endotracheal tube    Airway Device Size:  7.0    Style/Cuff Inflation:  Cuffed    Inflation Amount (mL):  8    Tube secured:  21    Secured at:  The lips    Placement Verified By:  Capnometry    Complicating Factors:  None    Findings Post-Intubation:  BS equal bilateral and atraumatic/condition of teeth unchanged

## 2022-02-20 LAB
ALBUMIN SERPL BCP-MCNC: 2.7 G/DL (ref 3.5–5.2)
ALP SERPL-CCNC: 117 U/L (ref 55–135)
ALT SERPL W/O P-5'-P-CCNC: 14 U/L (ref 10–44)
ANION GAP SERPL CALC-SCNC: 12 MMOL/L (ref 8–16)
AST SERPL-CCNC: 56 U/L (ref 10–40)
BASOPHILS # BLD AUTO: 0.06 K/UL (ref 0–0.2)
BASOPHILS # BLD AUTO: 0.06 K/UL (ref 0–0.2)
BASOPHILS # BLD AUTO: 0.09 K/UL (ref 0–0.2)
BASOPHILS # BLD AUTO: 0.1 K/UL (ref 0–0.2)
BASOPHILS NFR BLD: 0.6 % (ref 0–1.9)
BASOPHILS NFR BLD: 0.6 % (ref 0–1.9)
BASOPHILS NFR BLD: 0.7 % (ref 0–1.9)
BASOPHILS NFR BLD: 0.7 % (ref 0–1.9)
BILIRUB SERPL-MCNC: 1.9 MG/DL (ref 0.1–1)
BLD PROD TYP BPU: NORMAL
BLD PROD TYP BPU: NORMAL
BLOOD UNIT EXPIRATION DATE: NORMAL
BLOOD UNIT EXPIRATION DATE: NORMAL
BLOOD UNIT TYPE CODE: 9500
BLOOD UNIT TYPE CODE: 9500
BLOOD UNIT TYPE: NORMAL
BLOOD UNIT TYPE: NORMAL
BUN SERPL-MCNC: 19 MG/DL (ref 6–20)
CALCIUM SERPL-MCNC: 8.2 MG/DL (ref 8.7–10.5)
CHLORIDE SERPL-SCNC: 100 MMOL/L (ref 95–110)
CO2 SERPL-SCNC: 23 MMOL/L (ref 23–29)
CODING SYSTEM: NORMAL
CODING SYSTEM: NORMAL
CREAT SERPL-MCNC: 2.1 MG/DL (ref 0.5–1.4)
DIFFERENTIAL METHOD: ABNORMAL
DISPENSE STATUS: NORMAL
DISPENSE STATUS: NORMAL
EOSINOPHIL # BLD AUTO: 0.3 K/UL (ref 0–0.5)
EOSINOPHIL # BLD AUTO: 0.4 K/UL (ref 0–0.5)
EOSINOPHIL NFR BLD: 2 % (ref 0–8)
EOSINOPHIL NFR BLD: 2.2 % (ref 0–8)
EOSINOPHIL NFR BLD: 2.6 % (ref 0–8)
EOSINOPHIL NFR BLD: 3.5 % (ref 0–8)
ERYTHROCYTE [DISTWIDTH] IN BLOOD BY AUTOMATED COUNT: 15.6 % (ref 11.5–14.5)
ERYTHROCYTE [DISTWIDTH] IN BLOOD BY AUTOMATED COUNT: 15.7 % (ref 11.5–14.5)
ERYTHROCYTE [DISTWIDTH] IN BLOOD BY AUTOMATED COUNT: 15.8 % (ref 11.5–14.5)
ERYTHROCYTE [DISTWIDTH] IN BLOOD BY AUTOMATED COUNT: 15.8 % (ref 11.5–14.5)
EST. GFR  (AFRICAN AMERICAN): 29.5 ML/MIN/1.73 M^2
EST. GFR  (NON AFRICAN AMERICAN): 25.6 ML/MIN/1.73 M^2
GLUCOSE SERPL-MCNC: 134 MG/DL (ref 70–110)
HCT VFR BLD AUTO: 21.2 % (ref 37–48.5)
HCT VFR BLD AUTO: 23.3 % (ref 37–48.5)
HCT VFR BLD AUTO: 23.6 % (ref 37–48.5)
HCT VFR BLD AUTO: 24.5 % (ref 37–48.5)
HGB BLD-MCNC: 6.9 G/DL (ref 12–16)
HGB BLD-MCNC: 7.5 G/DL (ref 12–16)
HGB BLD-MCNC: 7.5 G/DL (ref 12–16)
HGB BLD-MCNC: 7.7 G/DL (ref 12–16)
IMM GRANULOCYTES # BLD AUTO: 0.06 K/UL (ref 0–0.04)
IMM GRANULOCYTES # BLD AUTO: 0.07 K/UL (ref 0–0.04)
IMM GRANULOCYTES # BLD AUTO: 0.07 K/UL (ref 0–0.04)
IMM GRANULOCYTES # BLD AUTO: 0.08 K/UL (ref 0–0.04)
IMM GRANULOCYTES NFR BLD AUTO: 0.5 % (ref 0–0.5)
IMM GRANULOCYTES NFR BLD AUTO: 0.6 % (ref 0–0.5)
IMM GRANULOCYTES NFR BLD AUTO: 0.6 % (ref 0–0.5)
IMM GRANULOCYTES NFR BLD AUTO: 0.7 % (ref 0–0.5)
LYMPHOCYTES # BLD AUTO: 1.7 K/UL (ref 1–4.8)
LYMPHOCYTES # BLD AUTO: 1.8 K/UL (ref 1–4.8)
LYMPHOCYTES # BLD AUTO: 2.1 K/UL (ref 1–4.8)
LYMPHOCYTES # BLD AUTO: 2.2 K/UL (ref 1–4.8)
LYMPHOCYTES NFR BLD: 15.4 % (ref 18–48)
LYMPHOCYTES NFR BLD: 15.7 % (ref 18–48)
LYMPHOCYTES NFR BLD: 17.5 % (ref 18–48)
LYMPHOCYTES NFR BLD: 17.7 % (ref 18–48)
MCH RBC QN AUTO: 32.1 PG (ref 27–31)
MCH RBC QN AUTO: 32.4 PG (ref 27–31)
MCH RBC QN AUTO: 32.6 PG (ref 27–31)
MCH RBC QN AUTO: 32.8 PG (ref 27–31)
MCHC RBC AUTO-ENTMCNC: 31.4 G/DL (ref 32–36)
MCHC RBC AUTO-ENTMCNC: 31.8 G/DL (ref 32–36)
MCHC RBC AUTO-ENTMCNC: 32.2 G/DL (ref 32–36)
MCHC RBC AUTO-ENTMCNC: 32.5 G/DL (ref 32–36)
MCV RBC AUTO: 100 FL (ref 82–98)
MCV RBC AUTO: 101 FL (ref 82–98)
MCV RBC AUTO: 102 FL (ref 82–98)
MCV RBC AUTO: 103 FL (ref 82–98)
MONOCYTES # BLD AUTO: 0.9 K/UL (ref 0.3–1)
MONOCYTES # BLD AUTO: 1 K/UL (ref 0.3–1)
MONOCYTES # BLD AUTO: 1.2 K/UL (ref 0.3–1)
MONOCYTES # BLD AUTO: 1.2 K/UL (ref 0.3–1)
MONOCYTES NFR BLD: 10.1 % (ref 4–15)
MONOCYTES NFR BLD: 8.3 % (ref 4–15)
MONOCYTES NFR BLD: 8.5 % (ref 4–15)
MONOCYTES NFR BLD: 9.2 % (ref 4–15)
NEUTROPHILS # BLD AUTO: 10.1 K/UL (ref 1.8–7.7)
NEUTROPHILS # BLD AUTO: 10.2 K/UL (ref 1.8–7.7)
NEUTROPHILS # BLD AUTO: 6.6 K/UL (ref 1.8–7.7)
NEUTROPHILS # BLD AUTO: 6.8 K/UL (ref 1.8–7.7)
NEUTROPHILS NFR BLD: 68.4 % (ref 38–73)
NEUTROPHILS NFR BLD: 68.5 % (ref 38–73)
NEUTROPHILS NFR BLD: 72.5 % (ref 38–73)
NEUTROPHILS NFR BLD: 72.9 % (ref 38–73)
NRBC BLD-RTO: 0 /100 WBC
PLATELET # BLD AUTO: 130 K/UL (ref 150–450)
PLATELET # BLD AUTO: 147 K/UL (ref 150–450)
PLATELET # BLD AUTO: 157 K/UL (ref 150–450)
PLATELET # BLD AUTO: 174 K/UL (ref 150–450)
PMV BLD AUTO: 10.7 FL (ref 9.2–12.9)
PMV BLD AUTO: 10.9 FL (ref 9.2–12.9)
PMV BLD AUTO: 11.2 FL (ref 9.2–12.9)
PMV BLD AUTO: 11.3 FL (ref 9.2–12.9)
POTASSIUM SERPL-SCNC: 4.2 MMOL/L (ref 3.5–5.1)
PROT SERPL-MCNC: 5.5 G/DL (ref 6–8.4)
RBC # BLD AUTO: 2.13 M/UL (ref 4–5.4)
RBC # BLD AUTO: 2.29 M/UL (ref 4–5.4)
RBC # BLD AUTO: 2.3 M/UL (ref 4–5.4)
RBC # BLD AUTO: 2.4 M/UL (ref 4–5.4)
SODIUM SERPL-SCNC: 135 MMOL/L (ref 136–145)
TRANS ERYTHROCYTES VOL PATIENT: NORMAL ML
TRANS ERYTHROCYTES VOL PATIENT: NORMAL ML
WBC # BLD AUTO: 13.83 K/UL (ref 3.9–12.7)
WBC # BLD AUTO: 14.03 K/UL (ref 3.9–12.7)
WBC # BLD AUTO: 9.68 K/UL (ref 3.9–12.7)
WBC # BLD AUTO: 9.91 K/UL (ref 3.9–12.7)

## 2022-02-20 PROCEDURE — 25000003 PHARM REV CODE 250: Performed by: PHYSICIAN ASSISTANT

## 2022-02-20 PROCEDURE — 25000003 PHARM REV CODE 250: Performed by: STUDENT IN AN ORGANIZED HEALTH CARE EDUCATION/TRAINING PROGRAM

## 2022-02-20 PROCEDURE — 80053 COMPREHEN METABOLIC PANEL: CPT | Performed by: GENERAL ACUTE CARE HOSPITAL

## 2022-02-20 PROCEDURE — P9021 RED BLOOD CELLS UNIT: HCPCS | Performed by: NURSE PRACTITIONER

## 2022-02-20 PROCEDURE — 85025 COMPLETE CBC W/AUTO DIFF WBC: CPT | Mod: 91 | Performed by: GENERAL ACUTE CARE HOSPITAL

## 2022-02-20 PROCEDURE — 94761 N-INVAS EAR/PLS OXIMETRY MLT: CPT

## 2022-02-20 PROCEDURE — 25000003 PHARM REV CODE 250: Performed by: INTERNAL MEDICINE

## 2022-02-20 PROCEDURE — 99900035 HC TECH TIME PER 15 MIN (STAT)

## 2022-02-20 PROCEDURE — 27000221 HC OXYGEN, UP TO 24 HOURS

## 2022-02-20 PROCEDURE — 20000000 HC ICU ROOM

## 2022-02-20 PROCEDURE — 99291 CRITICAL CARE FIRST HOUR: CPT | Mod: ,,, | Performed by: INTERNAL MEDICINE

## 2022-02-20 PROCEDURE — 36430 TRANSFUSION BLD/BLD COMPNT: CPT

## 2022-02-20 PROCEDURE — 99291 PR CRITICAL CARE, E/M 30-74 MINUTES: ICD-10-PCS | Mod: ,,, | Performed by: INTERNAL MEDICINE

## 2022-02-20 RX ORDER — HYDROCODONE BITARTRATE AND ACETAMINOPHEN 500; 5 MG/1; MG/1
TABLET ORAL
Status: DISCONTINUED | OUTPATIENT
Start: 2022-02-20 | End: 2022-02-23

## 2022-02-20 RX ORDER — ALPRAZOLAM 0.5 MG/1
0.5 TABLET ORAL ONCE
Status: COMPLETED | OUTPATIENT
Start: 2022-02-20 | End: 2022-02-20

## 2022-02-20 RX ADMIN — ACETAMINOPHEN 650 MG: 325 TABLET ORAL at 10:02

## 2022-02-20 RX ADMIN — MUPIROCIN: 20 OINTMENT TOPICAL at 09:02

## 2022-02-20 RX ADMIN — ALPRAZOLAM 0.5 MG: 0.5 TABLET ORAL at 09:02

## 2022-02-20 RX ADMIN — ATORVASTATIN CALCIUM 40 MG: 20 TABLET, FILM COATED ORAL at 08:02

## 2022-02-20 RX ADMIN — ACETAMINOPHEN 650 MG: 325 TABLET ORAL at 09:02

## 2022-02-20 RX ADMIN — NOREPINEPHRINE BITARTRATE 0.04 MCG/KG/MIN: 4 INJECTION, SOLUTION INTRAVENOUS at 05:02

## 2022-02-20 RX ADMIN — TRAZODONE HYDROCHLORIDE 100 MG: 50 TABLET ORAL at 09:02

## 2022-02-20 RX ADMIN — ALPRAZOLAM 0.5 MG: 0.5 TABLET ORAL at 02:02

## 2022-02-20 RX ADMIN — DULOXETINE 120 MG: 60 CAPSULE, DELAYED RELEASE ORAL at 08:02

## 2022-02-20 RX ADMIN — PANTOPRAZOLE SODIUM 40 MG: 40 TABLET, DELAYED RELEASE ORAL at 08:02

## 2022-02-20 RX ADMIN — ALPRAZOLAM 0.5 MG: 0.5 TABLET ORAL at 10:02

## 2022-02-20 NOTE — PLAN OF CARE
SICU PLAN OF CARE NOTE    Dx: Hemorrhagic shock    Shift Events: Up to bedside commode with standby assistance. Titrated Levo to maintain MAP >65.    Goals of Care: MAP >65    Neuro: AAO x4, Follows Commands, and Moves All Extremities    Cardiac:  NSR    Vital Signs: BP (!) 87/48   Pulse 105   Temp 97.7 °F (36.5 °C) (Oral)   Resp 19   Wt (!) 142.9 kg (315 lb)   LMP 06/08/2015   SpO2 96%   Breastfeeding No   BMI 50.84 kg/m²     Respiratory: Nasal Cannula    Diet: Regular Diet    Gtts: Norepinephrine    Urine Output: Voids Spontaneously 3 urine occurrences/shift    Labs/Accuchecks: labs trended and reviewed. Notified MD Bela of AM values. No new orders    Skin:  Skin warm and dry. No breakdown to sacrum. Up to bedside commode. Bed plugged in and working. Pt repositioned independently. Call light within reach.      POC reviewed with pt and sister. Answered all questions. See flowsheet for full assessment.

## 2022-02-20 NOTE — ASSESSMENT & PLAN NOTE
Patient had JHONATAN on 1.9 on presentation (baseline 0.9).  - avoiding renal toxins.  - recheck daily, slightly improved.  - ensure normotension and transfuse for acute blood loss.

## 2022-02-20 NOTE — ANESTHESIA POSTPROCEDURE EVALUATION
Anesthesia Post Evaluation    Patient: Vicky Alvarado    Procedure(s) Performed: Procedure(s) (LRB):  HYSTEROSCOPY, WITH DILATION AND CURETTAGE OF UTERUS (N/A)    Final Anesthesia Type: general      Patient location during evaluation: PACU  Patient participation: Yes- Able to Participate  Level of consciousness: awake  Post-procedure vital signs: reviewed and stable  Pain management: adequate  Airway patency: patent    PONV status at discharge: No PONV  Anesthetic complications: no      Cardiovascular status: blood pressure returned to baseline  Respiratory status: unassisted  Hydration status: euvolemic  Follow-up not needed.          Vitals Value Taken Time   BP 85/46 02/20/22 1649   Temp 36.6 °C (97.9 °F) 02/20/22 1615   Pulse 107 02/20/22 1655   Resp 24 02/20/22 1655   SpO2 99 % 02/20/22 1655   Vitals shown include unvalidated device data.      No case tracking events are documented in the log.      Pain/Tolu Score: Pain Rating Prior to Med Admin: 5 (2/20/2022 10:11 AM)  Pain Rating Post Med Admin: 1 (2/19/2022 11:52 PM)

## 2022-02-20 NOTE — ASSESSMENT & PLAN NOTE
Seems to be improving as vaginal bleeding is slowing.  HgB at 6.9 today and she received one unit PRBC.  - continue to follow daily CBC.  - transfuse to keep HgB >7  - target MAP >65mmHg.  Currently off levophed.  Monitor, and if stable will consider step down tomorrow.

## 2022-02-20 NOTE — ASSESSMENT & PLAN NOTE
S/p 2 cardioversion.  She is now in sinus, but is concerned that she may need her anti-arrhythmics resumed soon.  - holding propafenone and metoprolol at this time  - holding anticoagulation at this time.

## 2022-02-20 NOTE — PROGRESS NOTES
[facility]  Pulmonology  Progress Note    Patient Name: Vicky Alvarado  MRN: [id number]  Admission Date: 2/18/2022  Hospital Length of Stay: 2 days  Code Status: Full Code  Attending Provider: Elvin Man MD  Primary Care Provider: Gordy Cordero MD   Principal Problem: Hemorrhagic shock    Subjective:     Interval History: Today the patient is having improved vaginal bleeding.  She received 1 U PRBC for a Hgb of 6.9.  IV access replaced and she is awaiting midline placement tomorrow.  Weaned from pressors this afternoon.  She states she is feeling improved.    Objective:     Vital Signs (Most Recent):  Temp: 98.9 °F (37.2 °C) (02/20/22 1100)  Pulse: 88 (02/20/22 1400)  Resp: 17 (02/20/22 1400)  BP: (!) 103/58 (02/20/22 1400)  SpO2: 96 % (02/20/22 1400)   Vital Signs (24h Range):  Temp:  [97.16 °F (36.2 °C)-98.9 °F (37.2 °C)] 98.9 °F (37.2 °C)  Pulse:  [] 88  Resp:  [13-43] 17  SpO2:  [86 %-100 %] 96 %  BP: ()/(36-65) 103/58     Weight: (!) 142.9 kg (315 lb)  Body mass index is 50.84 kg/m².      Intake/Output Summary (Last 24 hours) at 2/20/2022 1611  Last data filed at 2/20/2022 1300  Gross per 24 hour   Intake 2836.86 ml   Output 175 ml   Net 2661.86 ml       Physical Exam  Constitutional:       General: She is not in acute distress.     Appearance: Normal appearance. She is obese. She is not ill-appearing, toxic-appearing or diaphoretic.   HENT:      Head: Normocephalic.      Nose: Nose normal.      Mouth/Throat:      Mouth: Mucous membranes are moist.   Eyes:      General: No scleral icterus.     Extraocular Movements: Extraocular movements intact.      Pupils: Pupils are equal, round, and reactive to light.   Cardiovascular:      Rate and Rhythm: Normal rate and regular rhythm.   Pulmonary:      Effort: Pulmonary effort is normal. No respiratory distress.      Breath sounds: No wheezing.   Abdominal:      General: Abdomen is flat. There is no distension.       Palpations: Abdomen is soft.      Tenderness: There is no abdominal tenderness.   Musculoskeletal:         General: Normal range of motion.      Cervical back: Normal range of motion.      Comments: Trace edema to BL LE   Skin:     General: Skin is warm and dry.      Capillary Refill: Capillary refill takes less than 2 seconds.   Neurological:      General: No focal deficit present.      Mental Status: She is alert and oriented to person, place, and time. Mental status is at baseline.   Psychiatric:         Mood and Affect: Mood normal.         Behavior: Behavior normal.         Thought Content: Thought content normal.         Judgment: Judgment normal.       Vents:  Oxygen Concentration (%): 2 (02/20/22 0800)    Lines/Drains/Airways       Peripheral Intravenous Line  Duration                  Midline Catheter Insertion/Assessment  - Single Lumen 02/20/22 1445 Right median cubital vein (antecubital fossa) 18g x 8cm <1 day                    Significant Labs:    CBC/Anemia Profile:  Recent Labs   Lab 02/20/22  0003 02/20/22  0358 02/20/22  1436   WBC 13.83* 14.03* 9.68   HGB 7.5* 7.5* 6.9*   HCT 23.3* 23.6* 21.2*    174 147*   * 103* 100*   RDW 15.6* 15.8* 15.7*        Chemistries:  Recent Labs   Lab 02/18/22  1829 02/19/22  0330 02/20/22  0358    139 135*   K 4.7 4.1 4.2    99 100   CO2 23 26 23   BUN 13 18 19   CREATININE 1.9* 2.2* 2.1*   CALCIUM 9.3 8.7 8.2*   ALBUMIN 3.0* 2.7* 2.7*   PROT 6.6 6.0 5.5*   BILITOT 1.4* 1.1* 1.9*   ALKPHOS 144* 129 117   ALT 22 20 14   * 107* 56*       All pertinent labs within the past 24 hours have been reviewed.    Significant Imaging:  I have reviewed all pertinent imaging results/findings within the past 24 hours.    Assessment/Plan:     * Hemorrhagic shock  Seems to be improving as vaginal bleeding is slowing.  HgB at 6.9 today and she received one unit PRBC.  - continue to follow daily CBC.  - transfuse to keep HgB >7  - target MAP >65mmHg.   Currently off levophed.  Monitor, and if stable will consider step down tomorrow.    JHONATAN (acute kidney injury)  Patient had JHONATAN on 1.9 on presentation (baseline 0.9).  - avoiding renal toxins.  - recheck daily, slightly improved.  - ensure normotension and transfuse for acute blood loss.        Vagina bleeding  Improving s/p D&C with OBGYN.  Continue to monitor.  Assistance appreciated.    Pure hypercholesterolemia  Continue statin    Atrial Fibrillation (paroxysmal)  S/p 2 cardioversion.  She is now in sinus, but is concerned that she may need her anti-arrhythmics resumed soon.  - holding propafenone and metoprolol at this time  - holding anticoagulation at this time.    Essential hypertension  Holding antihypertensives given shock.  Will resume as indicated.      PPI  Holding anticoagulation, SCDs    LINES:   Midline, PIV     regular diet    Nearing ability to step down.    Critical care time of 40 minutes.    Elvin Man MD  Pulmonology  [facility]

## 2022-02-20 NOTE — SUBJECTIVE & OBJECTIVE
Interval History: Today the patient is having improved vaginal bleeding.  She received 1 U PRBC for a Hgb of 6.9.  IV access replaced and she is awaiting midline placement tomorrow.  Weaned from pressors this afternoon.  She states she is feeling improved.    Objective:     Vital Signs (Most Recent):  Temp: 98.9 °F (37.2 °C) (02/20/22 1100)  Pulse: 88 (02/20/22 1400)  Resp: 17 (02/20/22 1400)  BP: (!) 103/58 (02/20/22 1400)  SpO2: 96 % (02/20/22 1400)   Vital Signs (24h Range):  Temp:  [97.16 °F (36.2 °C)-98.9 °F (37.2 °C)] 98.9 °F (37.2 °C)  Pulse:  [] 88  Resp:  [13-43] 17  SpO2:  [86 %-100 %] 96 %  BP: ()/(36-65) 103/58     Weight: (!) 142.9 kg (315 lb)  Body mass index is 50.84 kg/m².      Intake/Output Summary (Last 24 hours) at 2/20/2022 1611  Last data filed at 2/20/2022 1300  Gross per 24 hour   Intake 2836.86 ml   Output 175 ml   Net 2661.86 ml       Physical Exam  Constitutional:       General: She is not in acute distress.     Appearance: Normal appearance. She is obese. She is not ill-appearing, toxic-appearing or diaphoretic.   HENT:      Head: Normocephalic.      Nose: Nose normal.      Mouth/Throat:      Mouth: Mucous membranes are moist.   Eyes:      General: No scleral icterus.     Extraocular Movements: Extraocular movements intact.      Pupils: Pupils are equal, round, and reactive to light.   Cardiovascular:      Rate and Rhythm: Normal rate and regular rhythm.   Pulmonary:      Effort: Pulmonary effort is normal. No respiratory distress.      Breath sounds: No wheezing.   Abdominal:      General: Abdomen is flat. There is no distension.      Palpations: Abdomen is soft.      Tenderness: There is no abdominal tenderness.   Musculoskeletal:         General: Normal range of motion.      Cervical back: Normal range of motion.      Comments: Trace edema to BL LE   Skin:     General: Skin is warm and dry.      Capillary Refill: Capillary refill takes less than 2 seconds.   Neurological:       General: No focal deficit present.      Mental Status: She is alert and oriented to person, place, and time. Mental status is at baseline.   Psychiatric:         Mood and Affect: Mood normal.         Behavior: Behavior normal.         Thought Content: Thought content normal.         Judgment: Judgment normal.       Vents:  Oxygen Concentration (%): 2 (02/20/22 0800)    Lines/Drains/Airways       Peripheral Intravenous Line  Duration                  Midline Catheter Insertion/Assessment  - Single Lumen 02/20/22 1445 Right median cubital vein (antecubital fossa) 18g x 8cm <1 day                    Significant Labs:    CBC/Anemia Profile:  Recent Labs   Lab 02/20/22  0003 02/20/22  0358 02/20/22  1436   WBC 13.83* 14.03* 9.68   HGB 7.5* 7.5* 6.9*   HCT 23.3* 23.6* 21.2*    174 147*   * 103* 100*   RDW 15.6* 15.8* 15.7*        Chemistries:  Recent Labs   Lab 02/18/22  1829 02/19/22  0330 02/20/22  0358    139 135*   K 4.7 4.1 4.2    99 100   CO2 23 26 23   BUN 13 18 19   CREATININE 1.9* 2.2* 2.1*   CALCIUM 9.3 8.7 8.2*   ALBUMIN 3.0* 2.7* 2.7*   PROT 6.6 6.0 5.5*   BILITOT 1.4* 1.1* 1.9*   ALKPHOS 144* 129 117   ALT 22 20 14   * 107* 56*       All pertinent labs within the past 24 hours have been reviewed.    Significant Imaging:  I have reviewed all pertinent imaging results/findings within the past 24 hours.

## 2022-02-20 NOTE — HPI
Vicky Alvarado is a 56 year old female with HTN and history of atrial fibrillation s/p ablation 2 weeks ago (still on eliquis) who presented to [facility] ED on 2/18/22 complaining of vaginal bleeding for past 2 days. Patient reports she started noticed significant vaginal bleeding and pass clots on 2/16. Described some clots as the size of oranges. Reports associated abdominal cramping with vaginal bleeding. She is having to wear two large menstrual pads and changing them frequently. Standing up causes her to bleed more. Patient mentions that she has not menstruated in the last 5 years. Other associated symptoms included fatigue, dizziness, lightheadedness and NAPIER. Denies fever, chills, cough, chest pain, or sick contacts. Reports taking her last dose of eliquis on evening of 2/17 and took her antihypertensive medications this morning.      In the ED, patient found to be hypotensive with active vaginal hemorrhage. Hemoglobin at that time was 10.7.1 unit of emergency blood given. Gynecology consulted and recommending IV estrogen. Critical care medicine consulted for hemorrhagic shock.

## 2022-02-20 NOTE — OP NOTE
DATE OF PROCEDURE: 02/19/2022    PREOPERATIVE DIAGNOSES:   1. Postmenopausal Bleeding.      POSTOPERATIVE DIAGNOSES:   1. Postmenopausal Bleeding.     PROCEDURE:   1. Uterine dilation and curettage     SURGEON: Sneha Zuluaga MD    ANESTHESIA: General    IV FLUIDS: 600mL    URINE OUTPUT: 25 mL.     ESTIMATED BLOOD LOSS: 350 mL.     COMPLICATIONS: None.     FINDINGS:   1. Normal female external.   2. Cervix dilated 2cm, small blood clot present in cervix. Once removed, brisk bleeding noted.  3. Uterine length is 11cm.  4. Sharp curettage releases mostly blood product, followed by moderate amount of endometrial tissue. This was sent off for pathological review. After serial sweeps, gritty surface appreciated throughout with vast decrease in bleeding.  5. Cytotec 800mg placed rectally to ensure uterine tone there after    PROCEDURE IN DETAIL: After informed consent was obtained, the patient was taken   to the Operating Room. Anesthesia initiated and found to be adequate. She was placed in the dorsal lithotomy position and prepped and draped in the usual sterile fashion. Two right angle retractors were used to visualize the cervix. The anterior lip of the cervix was grasped with a single-tooth tenaculum. Uterus sounded to 11cm. A sharp curettage was then performed until there was a good uterine cry in all 4 quadrants. The tenaculum was then removed with noted hemostasis. Cytotec 800mg placed rectally. Sponge, lap and needle counts were correct x2. The patient tolerated the above procedure well and moved to ICU room in stable condition.     Sneha Zuluaga M.D.

## 2022-02-20 NOTE — PROGRESS NOTES
Dr. Man notified unable to maintain IV access or obtain blood on pt. Midline consulted, team not present on Sunday.  MD coming to bedside to assess plan.

## 2022-02-20 NOTE — TRANSFER OF CARE
Anesthesia Transfer of Care Note    Patient: Vicky Alvarado    Procedure(s) Performed: Procedure(s) (LRB):  HYSTEROSCOPY, WITH DILATION AND CURETTAGE OF UTERUS (N/A)    Patient location: ICU    Anesthesia Type: general    Transport from OR: Transported from OR on 6-10 L/min O2 by face mask with adequate spontaneous ventilation    Post pain: adequate analgesia    Post assessment: no apparent anesthetic complications and tolerated procedure well    Post vital signs: stable    Level of consciousness: awake, alert and oriented    Nausea/Vomiting: no nausea/vomiting    Complications: none    Transfer of care protocol was followed      Last vitals:   Visit Vitals  BP (!) 110/56 (BP Location: Right arm, Patient Position: Lying)   Pulse 83   Temp 36.2 °C (97.16 °F) (Oral)   Resp 18   Wt (!) 142.9 kg (315 lb)   LMP 06/08/2015   SpO2 (!) 93%   Breastfeeding No   BMI 50.84 kg/m²

## 2022-02-21 ENCOUNTER — PATIENT MESSAGE (OUTPATIENT)
Dept: CARDIOLOGY | Facility: CLINIC | Age: 57
End: 2022-02-21
Payer: COMMERCIAL

## 2022-02-21 ENCOUNTER — PATIENT MESSAGE (OUTPATIENT)
Dept: RHEUMATOLOGY | Facility: CLINIC | Age: 57
End: 2022-02-21
Payer: COMMERCIAL

## 2022-02-21 LAB
ALBUMIN SERPL BCP-MCNC: 2.6 G/DL (ref 3.5–5.2)
ALLENS TEST: ABNORMAL
ALP SERPL-CCNC: 113 U/L (ref 55–135)
ALT SERPL W/O P-5'-P-CCNC: 13 U/L (ref 10–44)
ANION GAP SERPL CALC-SCNC: 9 MMOL/L (ref 8–16)
AST SERPL-CCNC: 41 U/L (ref 10–40)
BASOPHILS # BLD AUTO: 0.04 K/UL (ref 0–0.2)
BASOPHILS # BLD AUTO: 0.05 K/UL (ref 0–0.2)
BASOPHILS NFR BLD: 0.5 % (ref 0–1.9)
BASOPHILS NFR BLD: 0.6 % (ref 0–1.9)
BILIRUB SERPL-MCNC: 1.1 MG/DL (ref 0.1–1)
BUN SERPL-MCNC: 17 MG/DL (ref 6–20)
CALCIUM SERPL-MCNC: 8.4 MG/DL (ref 8.7–10.5)
CHLORIDE SERPL-SCNC: 100 MMOL/L (ref 95–110)
CO2 SERPL-SCNC: 25 MMOL/L (ref 23–29)
CREAT SERPL-MCNC: 1.3 MG/DL (ref 0.5–1.4)
DELSYS: ABNORMAL
DIFFERENTIAL METHOD: ABNORMAL
DIFFERENTIAL METHOD: ABNORMAL
EOSINOPHIL # BLD AUTO: 0.3 K/UL (ref 0–0.5)
EOSINOPHIL # BLD AUTO: 0.3 K/UL (ref 0–0.5)
EOSINOPHIL NFR BLD: 4.1 % (ref 0–8)
EOSINOPHIL NFR BLD: 4.2 % (ref 0–8)
ERYTHROCYTE [DISTWIDTH] IN BLOOD BY AUTOMATED COUNT: 15.9 % (ref 11.5–14.5)
ERYTHROCYTE [DISTWIDTH] IN BLOOD BY AUTOMATED COUNT: 15.9 % (ref 11.5–14.5)
ERYTHROCYTE [SEDIMENTATION RATE] IN BLOOD BY WESTERGREN METHOD: 23 MM/H
EST. GFR  (AFRICAN AMERICAN): 52.6 ML/MIN/1.73 M^2
EST. GFR  (NON AFRICAN AMERICAN): 45.6 ML/MIN/1.73 M^2
FIO2: 28
FLOW: 2
GLUCOSE SERPL-MCNC: 113 MG/DL (ref 70–110)
HCO3 UR-SCNC: 29.4 MMOL/L (ref 24–28)
HCT VFR BLD AUTO: 23.5 % (ref 37–48.5)
HCT VFR BLD AUTO: 23.8 % (ref 37–48.5)
HGB BLD-MCNC: 7.1 G/DL (ref 12–16)
HGB BLD-MCNC: 7.6 G/DL (ref 12–16)
IMM GRANULOCYTES # BLD AUTO: 0.05 K/UL (ref 0–0.04)
IMM GRANULOCYTES # BLD AUTO: 0.06 K/UL (ref 0–0.04)
IMM GRANULOCYTES NFR BLD AUTO: 0.6 % (ref 0–0.5)
IMM GRANULOCYTES NFR BLD AUTO: 0.8 % (ref 0–0.5)
LYMPHOCYTES # BLD AUTO: 1.1 K/UL (ref 1–4.8)
LYMPHOCYTES # BLD AUTO: 1.7 K/UL (ref 1–4.8)
LYMPHOCYTES NFR BLD: 14.8 % (ref 18–48)
LYMPHOCYTES NFR BLD: 20.4 % (ref 18–48)
MCH RBC QN AUTO: 32.1 PG (ref 27–31)
MCH RBC QN AUTO: 32.3 PG (ref 27–31)
MCHC RBC AUTO-ENTMCNC: 30.2 G/DL (ref 32–36)
MCHC RBC AUTO-ENTMCNC: 31.9 G/DL (ref 32–36)
MCV RBC AUTO: 100 FL (ref 82–98)
MCV RBC AUTO: 107 FL (ref 82–98)
MODE: ABNORMAL
MONOCYTES # BLD AUTO: 0.6 K/UL (ref 0.3–1)
MONOCYTES # BLD AUTO: 0.8 K/UL (ref 0.3–1)
MONOCYTES NFR BLD: 10 % (ref 4–15)
MONOCYTES NFR BLD: 8.1 % (ref 4–15)
NEUTROPHILS # BLD AUTO: 5.2 K/UL (ref 1.8–7.7)
NEUTROPHILS # BLD AUTO: 5.4 K/UL (ref 1.8–7.7)
NEUTROPHILS NFR BLD: 64.2 % (ref 38–73)
NEUTROPHILS NFR BLD: 71.7 % (ref 38–73)
NRBC BLD-RTO: 0 /100 WBC
NRBC BLD-RTO: 0 /100 WBC
PCO2 BLDA: 46.9 MMHG (ref 35–45)
PH SMN: 7.41 [PH] (ref 7.35–7.45)
PLATELET # BLD AUTO: 133 K/UL (ref 150–450)
PLATELET # BLD AUTO: 143 K/UL (ref 150–450)
PMV BLD AUTO: 10.5 FL (ref 9.2–12.9)
PMV BLD AUTO: 10.9 FL (ref 9.2–12.9)
PO2 BLDA: 46 MMHG (ref 40–60)
POC BE: 5 MMOL/L
POC SATURATED O2: 82 % (ref 95–100)
POC TCO2: 31 MMOL/L (ref 24–29)
POTASSIUM SERPL-SCNC: 4.2 MMOL/L (ref 3.5–5.1)
PROT SERPL-MCNC: 5.3 G/DL (ref 6–8.4)
RBC # BLD AUTO: 2.2 M/UL (ref 4–5.4)
RBC # BLD AUTO: 2.37 M/UL (ref 4–5.4)
SAMPLE: ABNORMAL
SITE: ABNORMAL
SODIUM SERPL-SCNC: 134 MMOL/L (ref 136–145)
WBC # BLD AUTO: 7.51 K/UL (ref 3.9–12.7)
WBC # BLD AUTO: 8.07 K/UL (ref 3.9–12.7)

## 2022-02-21 PROCEDURE — 97161 PT EVAL LOW COMPLEX 20 MIN: CPT

## 2022-02-21 PROCEDURE — 99900035 HC TECH TIME PER 15 MIN (STAT)

## 2022-02-21 PROCEDURE — 97116 GAIT TRAINING THERAPY: CPT

## 2022-02-21 PROCEDURE — 20000000 HC ICU ROOM

## 2022-02-21 PROCEDURE — 99233 SBSQ HOSP IP/OBS HIGH 50: CPT | Mod: 25,,, | Performed by: INTERNAL MEDICINE

## 2022-02-21 PROCEDURE — 99233 PR SUBSEQUENT HOSPITAL CARE,LEVL III: ICD-10-PCS | Mod: 25,,, | Performed by: INTERNAL MEDICINE

## 2022-02-21 PROCEDURE — 25000003 PHARM REV CODE 250: Performed by: INTERNAL MEDICINE

## 2022-02-21 PROCEDURE — 85025 COMPLETE CBC W/AUTO DIFF WBC: CPT | Performed by: GENERAL ACUTE CARE HOSPITAL

## 2022-02-21 PROCEDURE — 25000003 PHARM REV CODE 250: Performed by: STUDENT IN AN ORGANIZED HEALTH CARE EDUCATION/TRAINING PROGRAM

## 2022-02-21 PROCEDURE — 85025 COMPLETE CBC W/AUTO DIFF WBC: CPT | Mod: 91 | Performed by: INTERNAL MEDICINE

## 2022-02-21 PROCEDURE — 36410 PR VENIPUNC NEED PHYS SKILL,DX OR RX: ICD-10-PCS | Mod: ,,, | Performed by: NURSE PRACTITIONER

## 2022-02-21 PROCEDURE — 94761 N-INVAS EAR/PLS OXIMETRY MLT: CPT

## 2022-02-21 PROCEDURE — 25000003 PHARM REV CODE 250: Performed by: PHYSICIAN ASSISTANT

## 2022-02-21 PROCEDURE — 25000003 PHARM REV CODE 250: Performed by: CLINICAL NURSE SPECIALIST

## 2022-02-21 PROCEDURE — 36410 VNPNXR 3YR/> PHY/QHP DX/THER: CPT | Mod: ,,, | Performed by: NURSE PRACTITIONER

## 2022-02-21 PROCEDURE — 80053 COMPREHEN METABOLIC PANEL: CPT | Performed by: GENERAL ACUTE CARE HOSPITAL

## 2022-02-21 RX ORDER — POLYETHYLENE GLYCOL 3350 17 G/17G
17 POWDER, FOR SOLUTION ORAL DAILY
Status: DISCONTINUED | OUTPATIENT
Start: 2022-02-21 | End: 2022-02-27 | Stop reason: HOSPADM

## 2022-02-21 RX ORDER — MISOPROSTOL 200 UG/1
800 TABLET ORAL ONCE AS NEEDED
Status: COMPLETED | OUTPATIENT
Start: 2022-02-19 | End: 2022-02-27

## 2022-02-21 RX ADMIN — MUPIROCIN: 20 OINTMENT TOPICAL at 08:02

## 2022-02-21 RX ADMIN — ACETAMINOPHEN 650 MG: 325 TABLET ORAL at 12:02

## 2022-02-21 RX ADMIN — POLYETHYLENE GLYCOL 3350 17 G: 17 POWDER, FOR SOLUTION ORAL at 12:02

## 2022-02-21 RX ADMIN — ALPRAZOLAM 0.5 MG: 0.5 TABLET ORAL at 12:02

## 2022-02-21 RX ADMIN — PANTOPRAZOLE SODIUM 40 MG: 40 TABLET, DELAYED RELEASE ORAL at 08:02

## 2022-02-21 RX ADMIN — MUPIROCIN: 20 OINTMENT TOPICAL at 09:02

## 2022-02-21 RX ADMIN — DULOXETINE 120 MG: 60 CAPSULE, DELAYED RELEASE ORAL at 08:02

## 2022-02-21 RX ADMIN — ATORVASTATIN CALCIUM 40 MG: 20 TABLET, FILM COATED ORAL at 08:02

## 2022-02-21 RX ADMIN — PROPAFENONE HYDROCHLORIDE 225 MG: 150 TABLET, FILM COATED ORAL at 02:02

## 2022-02-21 RX ADMIN — TRAZODONE HYDROCHLORIDE 100 MG: 50 TABLET ORAL at 09:02

## 2022-02-21 RX ADMIN — PROPAFENONE HYDROCHLORIDE 225 MG: 150 TABLET, FILM COATED ORAL at 09:02

## 2022-02-21 RX ADMIN — ALPRAZOLAM 0.5 MG: 0.5 TABLET ORAL at 09:02

## 2022-02-21 RX ADMIN — ACETAMINOPHEN 650 MG: 325 TABLET ORAL at 09:02

## 2022-02-21 NOTE — PLAN OF CARE
[facility] - Surgical Intensive Care  Initial Discharge Assessment       Primary Care Provider: Gordy Cordero MD    Admission Diagnosis: Hemorrhagic shock [R57.8]  Dizziness [R42]    Admission Date: 2/18/2022  Expected Discharge Date: 2/25/2022    Discharge Barriers Identified: None    Payor: BLUE CROSS [employer] BENEFIT / Plan: [employer] BLUE CROSS [place] / Product Type: Self Funded /     Extended Emergency Contact Information  Primary Emergency Contact: Zully Arita  Address: [address] [place] [address]  Home Phone: [phone]  Mobile Phone: [phone]  Relation: Sister    Discharge Plan A: Home Health  Discharge Plan B: Home with family      [facility]  [address]  [facility] [address]  Phone: [phone] Fax: [phone]    [facility]  [address]  [facility] [address]  Phone: [phone] Fax: [phone]      Initial Assessment (most recent)     Adult Discharge Assessment - 02/21/22 1017        Discharge Assessment    Assessment Type Discharge Planning Assessment     Confirmed/corrected address, phone number and insurance Yes     Confirmed Demographics Correct on Facesheet     Source of Information patient     Communicated RAYO with patient/caregiver Date not available/Unable to determine     Reason For Admission Hemorrhagic shock     Lives With alone     Do you expect to return to your current living situation? Yes     Do you have help at home or someone to help you manage your care at home? Yes     Who are your caregiver(s) and their phone number(s)? Zully Arita [phone]     Prior to hospitilization cognitive status: Alert/Oriented     Current cognitive status: Alert/Oriented     Home Layout Able to live on 1st floor     Equipment Currently Used at Home CPAP     Readmission within 30 days? Yes     Patient currently being followed by outpatient case management? No     Do you currently have  service(s) that help you manage your care at home? No     Do you take prescription medications? Yes     Is the patient taking medications as prescribed? yes     Who is going to help you get home at discharge? Zully Arita [phone]     How do you get to doctors appointments? family or friend will provide     Are you on dialysis? No     Do you take coumadin? No     Discharge Plan A Home Health     Discharge Plan B Home with family     DME Needed Upon Discharge  none     Discharge Plan discussed with: Patient     Discharge Barriers Identified None                 Readmission Assessment (most recent)     Readmission Assessment - 02/21/22 1019        Readmission    Why were you hospitalized in the last 30 days? YES     Why were you readmitted? Alarmed about signs/symptoms               This SW met with patient bedside to complete DPA. Questions answered / contact numbers provided.  Use PREFERRED PHARMACY / BEDSIDE DELIVERY for any necessary medications at time of discharge. The patient is independent with all ADLs - does use a CPAP, is not on HD or BTs . The patient's sister will be assisting with help upon discharge. The patient's sister will be providing transportation home. Hospital follow up will be scheduled with PCP. Will continue to follow for course of hospitalization.     Cristóbal Cuello LMSW  Case Management [facility]  Ext: [phone]

## 2022-02-21 NOTE — PLAN OF CARE
SICU PLAN OF CARE NOTE     Dx: Hemorrhagic shock     Shift Events: Up to bathroom with standby assistance. Minimal to no vaginal bleeding. Midline placed, see LDAs. PRN trazodone and xanax administered per MAR.      Goals of Care: MAP >65     Neuro: AAO x4, Follows Commands, and Moves All Extremities     Cardiac:  NSR     Vital Signs: BP (!) 87/48   Pulse 105   Temp 97.7 °F (36.5 °C) (Oral)   Resp 19   Wt (!) 142.9 kg (315 lb)   LMP 06/08/2015   SpO2 96%   Breastfeeding No   BMI 50.84 kg/m²      Respiratory: Nasal Cannula/ Room Air     Diet: Regular Diet     Urine Output: Voids Spontaneously 3 urine occurrences/shift     Labs/Accuchecks: labs trended and reviewed. No replacements needed.      Skin:  Skin warm and dry. No breakdown to sacrum. Up to bathroom. Pt independently washed and brushed teeth. Bed plugged in and working. Pt repositioned independently. Call light within reach.      POC reviewed with pt and sister. Answered all questions. See flowsheet for full assessment.

## 2022-02-21 NOTE — SUBJECTIVE & OBJECTIVE
Interval History: Hemoglobin stabilized and she is off pressors for >24 hours.  No further bleeding.  Restarting a. Fib meds slowly now that her pressor requirement has abated.  She feels improved.    Objective:     Vital Signs (Most Recent):  Temp: 98 °F (36.7 °C) (02/21/22 1045)  Pulse: 99 (02/21/22 1330)  Resp: (!) 43 (02/21/22 1330)  BP: 134/79 (02/21/22 1330)  SpO2: 96 % (02/21/22 1330)   Vital Signs (24h Range):  Temp:  [97.7 °F (36.5 °C)-98 °F (36.7 °C)] 98 °F (36.7 °C)  Pulse:  [] 99  Resp:  [10-52] 43  SpO2:  [88 %-100 %] 96 %  BP: ()/() 134/79     Weight: (!) 142.9 kg (315 lb)  Body mass index is 50.84 kg/m².      Intake/Output Summary (Last 24 hours) at 2/21/2022 1340  Last data filed at 2/21/2022 1300  Gross per 24 hour   Intake 1156 ml   Output 400 ml   Net 756 ml       Physical Exam  Constitutional:       General: She is not in acute distress.     Appearance: Normal appearance. She is obese. She is not ill-appearing, toxic-appearing or diaphoretic.   HENT:      Head: Normocephalic.      Nose: Nose normal.      Mouth/Throat:      Mouth: Mucous membranes are moist.   Eyes:      Extraocular Movements: Extraocular movements intact.      Pupils: Pupils are equal, round, and reactive to light.   Cardiovascular:      Rate and Rhythm: Normal rate and regular rhythm.   Pulmonary:      Effort: Pulmonary effort is normal.      Breath sounds: Normal breath sounds.   Abdominal:      General: Abdomen is flat. Bowel sounds are normal. There is no distension.      Palpations: Abdomen is soft.   Musculoskeletal:         General: Normal range of motion.      Cervical back: Normal range of motion.   Skin:     General: Skin is warm.   Neurological:      General: No focal deficit present.      Mental Status: She is alert and oriented to person, place, and time. Mental status is at baseline.   Psychiatric:         Mood and Affect: Mood normal.         Behavior: Behavior normal.         Thought Content:  Thought content normal.         Judgment: Judgment normal.       Vents:  Oxygen Concentration (%): 2 (02/20/22 0800)    Lines/Drains/Airways       Peripheral Intravenous Line  Duration                  Midline Catheter Insertion/Assessment  - Single Lumen 02/20/22 1445 Right median cubital vein (antecubital fossa) 18g x 8cm <1 day         Midline Catheter Insertion/Assessment  - Single Lumen 02/20/22 2355 Right basilic vein (medial side of arm) <1 day                    Significant Labs:    CBC/Anemia Profile:  Recent Labs   Lab 02/20/22  1436 02/20/22  2234 02/21/22  0341   WBC 9.68 9.91 8.07   HGB 6.9* 7.7* 7.1*   HCT 21.2* 24.5* 23.5*   * 130* 133*   * 102* 107*   RDW 15.7* 15.8* 15.9*        Chemistries:  Recent Labs   Lab 02/20/22  0358 02/21/22  0341   * 134*   K 4.2 4.2    100   CO2 23 25   BUN 19 17   CREATININE 2.1* 1.3   CALCIUM 8.2* 8.4*   ALBUMIN 2.7* 2.6*   PROT 5.5* 5.3*   BILITOT 1.9* 1.1*   ALKPHOS 117 113   ALT 14 13   AST 56* 41*       All pertinent labs within the past 24 hours have been reviewed.    Significant Imaging:  I have reviewed all pertinent imaging results/findings within the past 24 hours.

## 2022-02-21 NOTE — ASSESSMENT & PLAN NOTE
Seems to be improving as vaginal bleeding is slowing.  HgB at 6.9 today and she received one unit PRBC.  - continue to follow BID CBC.  - transfuse to keep HgB >7  - target MAP >65mmHg.  remains off levophed.

## 2022-02-21 NOTE — ASSESSMENT & PLAN NOTE
Patient had JHONATAN on 1.9 on presentation (baseline 0.9).  - avoiding renal toxins.  - recheck daily, improving to 1.3 today  - ensure normotension and transfuse for acute blood loss.

## 2022-02-21 NOTE — CARE UPDATE
Resident spoke with Mason Weems in regards to patient's current bleeding which is minimal. Patient is POD#2 s/p dilation and curettage. Previously completed a course of IV estrogen.     Recommendations:  - Will message clinic to schedule follow-up of postmenopausal bleeding in outpatient setting   - Continue strict pad count if bleeding increases   - Discharge home with provera 20 mg TID for 7 days, then 20 mg BID until follow-up  - Gynecology will be signing out. Please reconsult with any questions or concerns. Thank you.     Sola Reddy MD/MPH  OB/GYN PGY-1   [facility]

## 2022-02-21 NOTE — PLAN OF CARE
Pt AAO x3. VSS on room air. No vaginal bleeding noted. Ambulated in hallway with PT. Void x 2 independently. No acute events this shift. Refer to flow sheet for VS and further assessment.

## 2022-02-21 NOTE — PLAN OF CARE
Problem: Physical Therapy Goal  Goal: Physical Therapy Goal  Description: Goals to be met by: 3/7/22    Patient will increase functional independence with mobility by performin. Supine to sit with Stand-by Assistance  2. Sit to stand transfer with Modified [place]  3. Gait  x 250 feet with Supervision     Outcome: Ongoing, Progressing   Evaluation completed and goals appropriate. 2022

## 2022-02-21 NOTE — PT/OT/SLP EVAL
Physical Therapy Evaluation and treatment    Patient Name:  Vicky Alvarado   MRN:  [id number]    Recommendations:     Discharge Recommendations:   (home no needs)   Discharge Equipment Recommendations: none   Barriers to discharge: None    Assessment:     Vicky Alvarado is a 56 y.o. female admitted with a medical diagnosis of Hemorrhagic shock.  She presents with the following impairments/functional limitations:  impaired endurance, impaired functional mobilty, gait instability, impaired balance, decreased safety awareness pt tolerated treatment well but had slight decreased balance with gait training and SOB with gait training. Pt will benefit from skilled PT 3x/wk to progress physically. Pt will be able to discharge home with no needs when medically stable. Pt is s/p uterine D&C 2/19/22.    Rehab Prognosis: Good; patient would benefit from acute skilled PT services to address these deficits and reach maximum level of function.    Recent Surgery: Procedure(s) (LRB):  HYSTEROSCOPY, WITH DILATION AND CURETTAGE OF UTERUS (N/A) 2 Days Post-Op    Plan:     During this hospitalization, patient to be seen 3 x/week to address the identified rehab impairments via gait training, therapeutic activities and progress toward the following goals:    · Plan of Care Expires:  03/19/22    Subjective     Chief Complaint: pt c/o SOB with treatment.   Patient/Family Comments/goals:  To go home.   Pain/Comfort:  · Pain Rating 1: 0/10  · Pain Rating Post-Intervention 1: 0/10    Patients cultural, spiritual, Denominational conflicts given the current situation: no    Living Environment:  Pt works full-time as RN at [employer]. Pt lives alone in 1 story [facility] but has friends and family that can assist.   Prior to admission, patients level of function was Independent .  Equipment used at home: CPAP.  DME owned (not currently used): none.  Upon discharge, patient will have assistance from friends and family.    Objective:      Communicated with nurse prior to session.  Patient found right sidelying with telemetry, pulse ox (continuous), blood pressure cuff (hep lock IV)  upon PT entry to room.    General Precautions: Standard, fall   Orthopedic Precautions:    Braces:    Respiratory Status: Room air    Exams:  · Cognitive Exam:  Patient is oriented to Person, Place, Time and Situation  · RLE ROM: WFL  · RLE Strength: WFL  · LLE ROM: WFL  · LLE Strength: WFL    Functional Mobility:  · Bed Mobility:     · Rolling Left:  minimum assistance  · Supine to Sit: minimum assistance  ·   · Transfers:     · Sit to Stand:  stand by assistance with no AD  ·   · Gait: pt received gait training ~ 250 ft with SBA, RN present with portable monitor and pt had mask on.     Therapeutic Activities and Exercises:   pt and family received verbal instructions in role of PT and POC. Both  verbally expressed understanding of such    AM-PAC 6 CLICK MOBILITY  Total Score:17     Patient left on toilet in restroom. Pt RN in room. with all lines intact and RN present.    GOALS:   Multidisciplinary Problems     Physical Therapy Goals        Problem: Physical Therapy Goal    Goal Priority Disciplines Outcome Goal Variances Interventions   Physical Therapy Goal     PT, PT/OT Ongoing, Progressing     Description: Goals to be met by: 3/7/22    Patient will increase functional independence with mobility by performin. Supine to sit with Stand-by Assistance  2. Sit to stand transfer with Modified [place]  3. Gait  x 250 feet with Supervision                      History:     Past Medical History:   Diagnosis Date    Anticoagulant long-term use     Arthritis     Atrial fibrillation     cardioversion    Atrial fibrillation with RVR     Avascular necrosis     L hand    CHF (congestive heart failure)     Depression     Encounter for blood transfusion 2020    GERD (gastroesophageal reflux disease)     Hx of psychiatric care     Hypertension      Obese     Psychiatric problem     Sleep apnea        Past Surgical History:   Procedure Laterality Date    ABLATION OF ARRHYTHMOGENIC FOCUS FOR ATRIAL FIBRILLATION N/A 7/20/2020    Procedure: Ablation atrial fibrillation;  Surgeon: Gabriel Hawley MD;  Location: [facility] EP LAB;  Service: Cardiology;  Laterality: N/A;  afib, PVI, RFA, REENA, SHADY, anes, MB, 3 Prep    ABLATION OF ARRHYTHMOGENIC FOCUS FOR ATRIAL FIBRILLATION N/A 1/24/2022    Procedure: Ablation atrial fibrillation;  Surgeon: Gabriel Hawley MD;  Location: [facility] EP LAB;  Service: Cardiology;  Laterality: N/A;  afib/afl, PVI (re-do)/CTI, RFA, REENA (cx if SR), SHADY, anes, MB, 3 Prep    APPLICATION OF WOUND VACUUM-ASSISTED CLOSURE DEVICE Left 8/3/2020    Procedure: APPLICATION, WOUND VAC;  Surgeon: SEMAJ Márquez III, MD;  Location: [facility] OR 2ND FLR;  Service: Peripheral Vascular;  Laterality: Left;    APPLICATION OF WOUND VACUUM-ASSISTED CLOSURE DEVICE Left 8/6/2020    Procedure: APPLICATION, WOUND VAC;  Surgeon: SEMAJ Márquez III, MD;  [address];  Service: Peripheral Vascular;  Laterality: Left;    CARPAL TUNNEL RELEASE Right 6/10/2020    Procedure: RELEASE, CARPAL TUNNEL - RIGHT;  Surgeon: Adelaida Hall MD;  Location: [facility];  Service: Orthopedics;  Laterality: Right;  GENERAL AND REGIONAL    CARPAL TUNNEL RELEASE Left 5/5/2021    Procedure: RELEASE, CARPAL TUNNEL, LEFT;  Surgeon: Adelaida Hall MD;  Location: [facility];  Service: Orthopedics;  Laterality: Left;  GENERAL & REGIONAL IN PACU    CLOSURE OF WOUND Left 8/6/2020    Procedure: CLOSURE, WOUND;  Surgeon: SEMAJ Márquez III, MD;  Location: [facility] OR 2ND FLR;  Service: Peripheral Vascular;  Laterality: Left;  Complex    COLONOSCOPY N/A 8/17/2021    Procedure: COLONOSCOPY Suprep;  Surgeon: Loco Deluca MD;  Location: [facility];  Service: Endoscopy;  Laterality: N/A;    ESOPHAGOGASTRODUODENOSCOPY N/A 8/17/2021    Procedure: EGD  (ESOPHAGOGASTRODUODENOSCOPY);  Surgeon: Loco Deluca MD;  Location: [facility] ENDO;  Service: Endoscopy;  Laterality: N/A;  Patient is schedule to have her Covid test on 08/14/2021 at [facility] @ 9:30am. [place].    EXPLORATION OF FEMORAL ARTERY Left 7/21/2020    Procedure: EXPLORATION, ARTERY, FEMORAL;  Surgeon: SEMAJ Márquez III, MD;  Location: [facility] OR [address] [place] [address];  Service: Peripheral Vascular;  Laterality: Left;  1. Urgent Direct repair, L SFA branch laceration    FOOT SURGERY      HYSTEROSCOPY WITH DILATION AND CURETTAGE OF UTERUS N/A 2/19/2022    Procedure: HYSTEROSCOPY, WITH DILATION AND CURETTAGE OF UTERUS;  Surgeon: Shane Palomo MD;  [facility];  Service: OB/GYN;  Laterality: N/A;    TREATMENT OF CARDIAC ARRHYTHMIA N/A 1/29/2020    Procedure: CARDIOVERSION;  Surgeon: Gabriel Hawley MD;  Location: [facility] EP LAB;  Service: Cardiology;  Laterality: N/A;  af, demi, dccv, anes, mb, 345    TREATMENT OF CARDIAC ARRHYTHMIA  1/24/2022    Procedure: Cardioversion or Defibrillation;  Surgeon: Gabriel Hawley MD;  Location: [facility] EP LAB;  Service: Cardiology;;    WOUND DEBRIDEMENT Left 8/6/2020    Procedure: DEBRIDEMENT, WOUND;  Surgeon: SEMAJ Márquez III, MD;  Location: [facility] OR [address] [place] [address];  Service: Peripheral Vascular;  Laterality: Left;       Time Tracking:     PT Received On: 02/21/22  PT Start Time: 1040     PT Stop Time: 1056  PT Total Time (min): 16 min     Billable Minutes: Evaluation 8 min and Gait Training 8 min      02/21/2022

## 2022-02-21 NOTE — ASSESSMENT & PLAN NOTE
S/p 2 cardioversion.  She is now in sinus, but is concerned that she may need her anti-arrhythmics resumed soon.  - starting propafenone  - holding metoprolol at this time.  Resume as tolerated.  - holding anticoagulation at this time.

## 2022-02-21 NOTE — PROCEDURES
Vicky Alvarado is a 56 y.o. female patient.    Temp: 97.8 °F (36.6 °C) (02/20/22 2315)  Pulse: 93 (02/20/22 2345)  Resp: 15 (02/20/22 2345)  BP: (!) 107/54 (02/20/22 2330)  SpO2: 95 % (02/20/22 2345)  Weight: (!) 142.9 kg (315 lb) (02/18/22 0852)       Insert peripheral IV    Date/Time: 2/21/2022 12:25 AM  Performed by: Denia Arnold NP  Authorized by: Denia Arnold NP   Consent: Verbal consent obtained.  Risks and benefits: risks, benefits and alternatives were discussed  Consent given by: patient  Patient understanding: patient states understanding of the procedure being performed  Patient consent: the patient's understanding of the procedure matches consent given  Procedure consent: procedure consent matches procedure scheduled  Relevant documents: relevant documents present and verified  Test results: test results available and properly labeled  Site marked: the operative site was marked  Imaging studies: imaging studies available  Required items: required blood products, implants, devices, and special equipment available  Patient identity confirmed: verbally with patient, arm band, provided demographic data and hospital-assigned identification number  Preparation: Patient was prepped and draped in the usual sterile fashion.  Local anesthesia used: no    Anesthesia:  Local anesthesia used: no    Sedation:  Patient sedated: no    Patient tolerance: patient tolerated the procedure well with no immediate complications  Comments: Nursing staff unable to obtain adequate PIV access.   5f, 10 cm PIV placed in right upper arm under ultrasound guidance using seldinger technique on initial attempt. IV placed under full sterile conditions. + blood return, flushes easily. Sutured in place.         Denia Arnold NP  Critical Care Medicine    2/21/2022

## 2022-02-21 NOTE — RESIDENT HANDOFF
ICU Transfer of Care Note  Critical Care Medicine    Admit Date: 2/18/2022  LOS: 3    CC: Hemorrhagic shock    Code Status: Full Code     Transfer to Hospital Medicine discussed      HPI and Hospital Course:     HPI:  Vicky Alvarado is a 56 year old female with HTN and history of atrial fibrillation s/p ablation 2 weeks ago (still on eliquis) who presented to [facility] ED on 2/18/22 complaining of vaginal bleeding for past 2 days. Patient reports she started noticed significant vaginal bleeding and pass clots on 2/16. Described some clots as the size of oranges. Reports associated abdominal cramping with vaginal bleeding. She is having to wear two large menstrual pads and changing them frequently. Standing up causes her to bleed more. Patient mentions that she has not menstruated in the last 5 years. Other associated symptoms included fatigue, dizziness, lightheadedness and NAPIER. Denies fever, chills, cough, chest pain, or sick contacts. Reports taking her last dose of eliquis on evening of 2/17 and took her antihypertensive medications this morning.     In the ED, patient found to be hypotensive with active vaginal hemorrhage. Hemoglobin at that time was 10.7.1 unit of emergency blood given. Gynecology consulted and recommending IV estrogen. Critical care medicine consulted for hemorrhagic shock.     Hospital/ICU Course:  2/18:  Patient was admitted for hemorrhagic shock due to vaginal bleeding was transfused 1 unit PRBCs.  Also started on low-dose norepinephrine infusion.  Gynecology consulted and started estrogen  2/19:  Was on low doses of norepinephrine infusion this morning but was stopped.  Stable H/H. Continued vaginal bleeding.  Start trial 1 L normal saline infusion for acute renal failure and shock. Condition improved. Ready to step down    To Follow Up:     OB   CBC BID  Restart Afib meds as blood pressure tolerates    Discharge Plan:   Home  Call with questions. [id number]

## 2022-02-21 NOTE — PROGRESS NOTES
[facility]  Pulmonology  Progress Note    Patient Name: Vicky Alvarado  MRN: [id number]  Admission Date: 2/18/2022  Hospital Length of Stay: 3 days  Code Status: Full Code  Attending Provider: Elvin Man MD  Primary Care Provider: Gordy Cordero MD   Principal Problem: Hemorrhagic shock    Subjective:     Interval History: Hemoglobin stabilized and she is off pressors for >24 hours.  No further bleeding.  Restarting a. Fib meds slowly now that her pressor requirement has abated.  She feels improved.    Objective:     Vital Signs (Most Recent):  Temp: 98 °F (36.7 °C) (02/21/22 1045)  Pulse: 99 (02/21/22 1330)  Resp: (!) 43 (02/21/22 1330)  BP: 134/79 (02/21/22 1330)  SpO2: 96 % (02/21/22 1330)   Vital Signs (24h Range):  Temp:  [97.7 °F (36.5 °C)-98 °F (36.7 °C)] 98 °F (36.7 °C)  Pulse:  [] 99  Resp:  [10-52] 43  SpO2:  [88 %-100 %] 96 %  BP: ()/() 134/79     Weight: (!) 142.9 kg (315 lb)  Body mass index is 50.84 kg/m².      Intake/Output Summary (Last 24 hours) at 2/21/2022 1340  Last data filed at 2/21/2022 1300  Gross per 24 hour   Intake 1156 ml   Output 400 ml   Net 756 ml       Physical Exam  Constitutional:       General: She is not in acute distress.     Appearance: Normal appearance. She is obese. She is not ill-appearing, toxic-appearing or diaphoretic.   HENT:      Head: Normocephalic.      Nose: Nose normal.      Mouth/Throat:      Mouth: Mucous membranes are moist.   Eyes:      Extraocular Movements: Extraocular movements intact.      Pupils: Pupils are equal, round, and reactive to light.   Cardiovascular:      Rate and Rhythm: Normal rate and regular rhythm.   Pulmonary:      Effort: Pulmonary effort is normal.      Breath sounds: Normal breath sounds.   Abdominal:      General: Abdomen is flat. Bowel sounds are normal. There is no distension.      Palpations: Abdomen is soft.   Musculoskeletal:         General: Normal range of motion.      Cervical  back: Normal range of motion.   Skin:     General: Skin is warm.   Neurological:      General: No focal deficit present.      Mental Status: She is alert and oriented to person, place, and time. Mental status is at baseline.   Psychiatric:         Mood and Affect: Mood normal.         Behavior: Behavior normal.         Thought Content: Thought content normal.         Judgment: Judgment normal.       Vents:  Oxygen Concentration (%): 2 (02/20/22 0800)    Lines/Drains/Airways       Peripheral Intravenous Line  Duration                  Midline Catheter Insertion/Assessment  - Single Lumen 02/20/22 1445 Right median cubital vein (antecubital fossa) 18g x 8cm <1 day         Midline Catheter Insertion/Assessment  - Single Lumen 02/20/22 2355 Right basilic vein (medial side of arm) <1 day                    Significant Labs:    CBC/Anemia Profile:  Recent Labs   Lab 02/20/22  1436 02/20/22  2234 02/21/22  0341   WBC 9.68 9.91 8.07   HGB 6.9* 7.7* 7.1*   HCT 21.2* 24.5* 23.5*   * 130* 133*   * 102* 107*   RDW 15.7* 15.8* 15.9*        Chemistries:  Recent Labs   Lab 02/20/22  0358 02/21/22  0341   * 134*   K 4.2 4.2    100   CO2 23 25   BUN 19 17   CREATININE 2.1* 1.3   CALCIUM 8.2* 8.4*   ALBUMIN 2.7* 2.6*   PROT 5.5* 5.3*   BILITOT 1.9* 1.1*   ALKPHOS 117 113   ALT 14 13   AST 56* 41*       All pertinent labs within the past 24 hours have been reviewed.    Significant Imaging:  I have reviewed all pertinent imaging results/findings within the past 24 hours.    Assessment/Plan:     * Hemorrhagic shock  Seems to be improving as vaginal bleeding is slowing.  HgB at 6.9 today and she received one unit PRBC.  - continue to follow BID CBC.  - transfuse to keep HgB >7  - target MAP >65mmHg.  remains off levophed.      JHONATAN (acute kidney injury)  Patient had JHONATAN on 1.9 on presentation (baseline 0.9).  - avoiding renal toxins.  - recheck daily, improving to 1.3 today  - ensure normotension and transfuse  for acute blood loss.        Vagina bleeding  Improving s/p D&C with OBGYN.    Continue to monitor.    Assistance appreciated.    Pure hypercholesterolemia  Continue statin    Atrial Fibrillation (paroxysmal)  S/p 2 cardioversion.  She is now in sinus, but is concerned that she may need her anti-arrhythmics resumed soon.  - starting propafenone  - holding metoprolol at this time.  Resume as tolerated.  - holding anticoagulation at this time.     Essential hypertension  Previously holding antihypertensives given shock.  Will resume as indicated.  - now off pressors.  Add back slowly      Stable for step down.      Holding anticoagulation due to vaginal bleed.  SCD    PIV (midline)     Elvin Man MD  Pulmonology  [facility]

## 2022-02-21 NOTE — ASSESSMENT & PLAN NOTE
Previously holding antihypertensives given shock.  Will resume as indicated.  - now off pressors.  Add back slowly

## 2022-02-22 LAB
ABO + RH BLD: NORMAL
ALBUMIN SERPL BCP-MCNC: 2.7 G/DL (ref 3.5–5.2)
ALP SERPL-CCNC: 140 U/L (ref 55–135)
ALT SERPL W/O P-5'-P-CCNC: 12 U/L (ref 10–44)
ANION GAP SERPL CALC-SCNC: 8 MMOL/L (ref 8–16)
AST SERPL-CCNC: 51 U/L (ref 10–40)
BASOPHILS # BLD AUTO: 0.05 K/UL (ref 0–0.2)
BASOPHILS # BLD AUTO: 0.06 K/UL (ref 0–0.2)
BASOPHILS NFR BLD: 0.7 % (ref 0–1.9)
BASOPHILS NFR BLD: 0.7 % (ref 0–1.9)
BILIRUB SERPL-MCNC: 0.8 MG/DL (ref 0.1–1)
BLD GP AB SCN CELLS X3 SERPL QL: NORMAL
BLD PROD TYP BPU: NORMAL
BLOOD UNIT EXPIRATION DATE: NORMAL
BLOOD UNIT TYPE CODE: 9500
BLOOD UNIT TYPE: NORMAL
BUN SERPL-MCNC: 11 MG/DL (ref 6–20)
CALCIUM SERPL-MCNC: 9 MG/DL (ref 8.7–10.5)
CHLORIDE SERPL-SCNC: 100 MMOL/L (ref 95–110)
CO2 SERPL-SCNC: 25 MMOL/L (ref 23–29)
CODING SYSTEM: NORMAL
CREAT SERPL-MCNC: 0.9 MG/DL (ref 0.5–1.4)
DIFFERENTIAL METHOD: ABNORMAL
DIFFERENTIAL METHOD: ABNORMAL
DISPENSE STATUS: NORMAL
EOSINOPHIL # BLD AUTO: 0.3 K/UL (ref 0–0.5)
EOSINOPHIL # BLD AUTO: 0.3 K/UL (ref 0–0.5)
EOSINOPHIL NFR BLD: 4.1 % (ref 0–8)
EOSINOPHIL NFR BLD: 4.2 % (ref 0–8)
ERYTHROCYTE [DISTWIDTH] IN BLOOD BY AUTOMATED COUNT: 16.1 % (ref 11.5–14.5)
ERYTHROCYTE [DISTWIDTH] IN BLOOD BY AUTOMATED COUNT: 16.1 % (ref 11.5–14.5)
EST. GFR  (AFRICAN AMERICAN): >60 ML/MIN/1.73 M^2
EST. GFR  (NON AFRICAN AMERICAN): >60 ML/MIN/1.73 M^2
GLUCOSE SERPL-MCNC: 135 MG/DL (ref 70–110)
HCT VFR BLD AUTO: 23.1 % (ref 37–48.5)
HCT VFR BLD AUTO: 23.6 % (ref 37–48.5)
HGB BLD-MCNC: 7.2 G/DL (ref 12–16)
HGB BLD-MCNC: 7.9 G/DL (ref 12–16)
IMM GRANULOCYTES # BLD AUTO: 0.05 K/UL (ref 0–0.04)
IMM GRANULOCYTES # BLD AUTO: 0.11 K/UL (ref 0–0.04)
IMM GRANULOCYTES NFR BLD AUTO: 0.7 % (ref 0–0.5)
IMM GRANULOCYTES NFR BLD AUTO: 1.3 % (ref 0–0.5)
LYMPHOCYTES # BLD AUTO: 1.3 K/UL (ref 1–4.8)
LYMPHOCYTES # BLD AUTO: 1.3 K/UL (ref 1–4.8)
LYMPHOCYTES NFR BLD: 16.4 % (ref 18–48)
LYMPHOCYTES NFR BLD: 17.9 % (ref 18–48)
MAGNESIUM SERPL-MCNC: 2 MG/DL (ref 1.6–2.6)
MCH RBC QN AUTO: 31.7 PG (ref 27–31)
MCH RBC QN AUTO: 32.5 PG (ref 27–31)
MCHC RBC AUTO-ENTMCNC: 31.2 G/DL (ref 32–36)
MCHC RBC AUTO-ENTMCNC: 33.5 G/DL (ref 32–36)
MCV RBC AUTO: 102 FL (ref 82–98)
MCV RBC AUTO: 97 FL (ref 82–98)
MONOCYTES # BLD AUTO: 0.7 K/UL (ref 0.3–1)
MONOCYTES # BLD AUTO: 0.7 K/UL (ref 0.3–1)
MONOCYTES NFR BLD: 8.9 % (ref 4–15)
MONOCYTES NFR BLD: 9 % (ref 4–15)
NEUTROPHILS # BLD AUTO: 4.9 K/UL (ref 1.8–7.7)
NEUTROPHILS # BLD AUTO: 5.6 K/UL (ref 1.8–7.7)
NEUTROPHILS NFR BLD: 67.6 % (ref 38–73)
NEUTROPHILS NFR BLD: 68.5 % (ref 38–73)
NRBC BLD-RTO: 1 /100 WBC
NRBC BLD-RTO: 1 /100 WBC
NUM UNITS TRANS PACKED RBC: NORMAL
PHOSPHATE SERPL-MCNC: 2 MG/DL (ref 2.7–4.5)
PLATELET # BLD AUTO: 148 K/UL (ref 150–450)
PLATELET # BLD AUTO: 158 K/UL (ref 150–450)
PMV BLD AUTO: 10.7 FL (ref 9.2–12.9)
PMV BLD AUTO: 11.2 FL (ref 9.2–12.9)
POTASSIUM SERPL-SCNC: 4.1 MMOL/L (ref 3.5–5.1)
PROT SERPL-MCNC: 5.8 G/DL (ref 6–8.4)
RBC # BLD AUTO: 2.27 M/UL (ref 4–5.4)
RBC # BLD AUTO: 2.43 M/UL (ref 4–5.4)
SODIUM SERPL-SCNC: 133 MMOL/L (ref 136–145)
WBC # BLD AUTO: 7.25 K/UL (ref 3.9–12.7)
WBC # BLD AUTO: 8.16 K/UL (ref 3.9–12.7)

## 2022-02-22 PROCEDURE — 25000003 PHARM REV CODE 250: Performed by: STUDENT IN AN ORGANIZED HEALTH CARE EDUCATION/TRAINING PROGRAM

## 2022-02-22 PROCEDURE — 85025 COMPLETE CBC W/AUTO DIFF WBC: CPT | Performed by: STUDENT IN AN ORGANIZED HEALTH CARE EDUCATION/TRAINING PROGRAM

## 2022-02-22 PROCEDURE — 25000003 PHARM REV CODE 250: Performed by: INTERNAL MEDICINE

## 2022-02-22 PROCEDURE — 86850 RBC ANTIBODY SCREEN: CPT | Performed by: STUDENT IN AN ORGANIZED HEALTH CARE EDUCATION/TRAINING PROGRAM

## 2022-02-22 PROCEDURE — 80053 COMPREHEN METABOLIC PANEL: CPT | Performed by: GENERAL ACUTE CARE HOSPITAL

## 2022-02-22 PROCEDURE — 25000003 PHARM REV CODE 250: Performed by: PHYSICIAN ASSISTANT

## 2022-02-22 PROCEDURE — 84100 ASSAY OF PHOSPHORUS: CPT | Performed by: STUDENT IN AN ORGANIZED HEALTH CARE EDUCATION/TRAINING PROGRAM

## 2022-02-22 PROCEDURE — 99900035 HC TECH TIME PER 15 MIN (STAT)

## 2022-02-22 PROCEDURE — 83735 ASSAY OF MAGNESIUM: CPT | Performed by: STUDENT IN AN ORGANIZED HEALTH CARE EDUCATION/TRAINING PROGRAM

## 2022-02-22 PROCEDURE — 36415 COLL VENOUS BLD VENIPUNCTURE: CPT | Performed by: INTERNAL MEDICINE

## 2022-02-22 PROCEDURE — 94761 N-INVAS EAR/PLS OXIMETRY MLT: CPT

## 2022-02-22 PROCEDURE — 85025 COMPLETE CBC W/AUTO DIFF WBC: CPT | Mod: 91 | Performed by: INTERNAL MEDICINE

## 2022-02-22 PROCEDURE — 99233 PR SUBSEQUENT HOSPITAL CARE,LEVL III: ICD-10-PCS | Mod: ,,, | Performed by: INTERNAL MEDICINE

## 2022-02-22 PROCEDURE — 99233 SBSQ HOSP IP/OBS HIGH 50: CPT | Mod: ,,, | Performed by: INTERNAL MEDICINE

## 2022-02-22 PROCEDURE — 86920 COMPATIBILITY TEST SPIN: CPT | Performed by: INTERNAL MEDICINE

## 2022-02-22 PROCEDURE — 11000001 HC ACUTE MED/SURG PRIVATE ROOM

## 2022-02-22 PROCEDURE — 25000003 PHARM REV CODE 250: Performed by: CLINICAL NURSE SPECIALIST

## 2022-02-22 RX ORDER — SODIUM,POTASSIUM PHOSPHATES 280-250MG
2 POWDER IN PACKET (EA) ORAL ONCE
Status: COMPLETED | OUTPATIENT
Start: 2022-02-22 | End: 2022-02-22

## 2022-02-22 RX ORDER — METOPROLOL SUCCINATE 50 MG/1
50 TABLET, EXTENDED RELEASE ORAL DAILY
Status: DISCONTINUED | OUTPATIENT
Start: 2022-02-22 | End: 2022-02-27 | Stop reason: HOSPADM

## 2022-02-22 RX ADMIN — PROPAFENONE HYDROCHLORIDE 225 MG: 150 TABLET, FILM COATED ORAL at 06:02

## 2022-02-22 RX ADMIN — ATORVASTATIN CALCIUM 40 MG: 20 TABLET, FILM COATED ORAL at 08:02

## 2022-02-22 RX ADMIN — PROPAFENONE HYDROCHLORIDE 225 MG: 150 TABLET, FILM COATED ORAL at 10:02

## 2022-02-22 RX ADMIN — DULOXETINE 120 MG: 60 CAPSULE, DELAYED RELEASE ORAL at 08:02

## 2022-02-22 RX ADMIN — ALPRAZOLAM 0.5 MG: 0.5 TABLET ORAL at 02:02

## 2022-02-22 RX ADMIN — METOPROLOL SUCCINATE 50 MG: 50 TABLET, EXTENDED RELEASE ORAL at 12:02

## 2022-02-22 RX ADMIN — POTASSIUM & SODIUM PHOSPHATES POWDER PACK 280-160-250 MG 2 PACKET: 280-160-250 PACK at 06:02

## 2022-02-22 RX ADMIN — PANTOPRAZOLE SODIUM 40 MG: 40 TABLET, DELAYED RELEASE ORAL at 08:02

## 2022-02-22 RX ADMIN — POLYETHYLENE GLYCOL 3350 17 G: 17 POWDER, FOR SOLUTION ORAL at 08:02

## 2022-02-22 RX ADMIN — TRAZODONE HYDROCHLORIDE 100 MG: 50 TABLET ORAL at 09:02

## 2022-02-22 RX ADMIN — ACETAMINOPHEN 650 MG: 325 TABLET ORAL at 02:02

## 2022-02-22 RX ADMIN — ALPRAZOLAM 0.5 MG: 0.5 TABLET ORAL at 09:02

## 2022-02-22 RX ADMIN — PROPAFENONE HYDROCHLORIDE 225 MG: 150 TABLET, FILM COATED ORAL at 02:02

## 2022-02-22 RX ADMIN — ACETAMINOPHEN 650 MG: 325 TABLET ORAL at 09:02

## 2022-02-22 NOTE — ASSESSMENT & PLAN NOTE
Previously holding antihypertensives given shock.  Will resume as indicated.  - off pressors.    - starting metoprolol today.

## 2022-02-22 NOTE — PLAN OF CARE
Afebrile. SpO2 maintained > 90% on RA during waking hours and home CPAP for HS. NSR-ST rate 90s-100s. MAPs > 65 without intervention. AAO x 4, follows commands, and moves all extremities. No gtts. Cardiac diet tolerated. Ambulates to bathroom on portable monitoring x 2 with standby assistance, steady gait. Minimal vaginal bleeding, 2 small clots passed and 1 pad with < 25% saturation. Voids spontaneously and without difficulty x 2 this shift. Passing flatus and on bowel regimen. Daily CMP and BID CBC. T&S up to date. Phos replaced as ordered. No skin breakdown noted. Repositions independently with minimal assistance. Uses pillow support. Linnet bed plugged in and working. Complete linen change. POC reviewed with patient and her sister. All questions and concerns addressed.

## 2022-02-22 NOTE — ASSESSMENT & PLAN NOTE
Seems to be improving as vaginal bleeding is resolved.  - continue to follow BID CBC.  - transfuse to keep HgB >7  - target MAP >65mmHg.  remains off levophed.

## 2022-02-22 NOTE — ASSESSMENT & PLAN NOTE
S/p 2 cardioversion.  She is now in sinus, but is concerned that she may need her anti-arrhythmics resumed soon.  - continue propafenone  - resume metoprolol.  Increase to home dose as tolerated.  - holding anticoagulation at this time. If BP is stable on home antihypertensives, and HgB uptrending, it would be reasonable to retry.  Will hold for now.

## 2022-02-22 NOTE — PROGRESS NOTES
[facility]  Pulmonology  Progress Note    Patient Name: Vicky Alvarado  MRN: [id number]  Admission Date: 2/18/2022  Hospital Length of Stay: 4 days  Code Status: Full Code  Attending Provider: Elvin Man MD  Primary Care Provider: Gordy Cordero MD   Principal Problem: Hemorrhagic shock    Subjective:     Interval History: patient has tolerated restarting of metoprolol and propafenone.  She is being recommended to resume anticoagulation, as possible.  HgB uptrending.  She is feeling fine.    Objective:     Vital Signs (Most Recent):  Temp: 98.1 °F (36.7 °C) (02/22/22 0800)  Pulse: 86 (02/22/22 1300)  Resp: (!) 32 (02/22/22 1300)  BP: (!) 105/58 (02/22/22 1300)  SpO2: (!) 94 % (02/22/22 1300)   Vital Signs (24h Range):  Temp:  [98.1 °F (36.7 °C)-98.4 °F (36.9 °C)] 98.1 °F (36.7 °C)  Pulse:  [] 86  Resp:  [13-52] 32  SpO2:  [88 %-97 %] 94 %  BP: ()/(50-83) 105/58     Weight: (!) 142.9 kg (315 lb)  Body mass index is 50.84 kg/m².      Intake/Output Summary (Last 24 hours) at 2/22/2022 1423  Last data filed at 2/22/2022 1200  Gross per 24 hour   Intake 740 ml   Output --   Net 740 ml         Physical Exam  Constitutional:       General: She is not in acute distress.     Appearance: Normal appearance. She is obese. She is not ill-appearing, toxic-appearing or diaphoretic.   HENT:      Head: Normocephalic.      Nose: Nose normal.      Mouth/Throat:      Mouth: Mucous membranes are moist.   Eyes:      Extraocular Movements: Extraocular movements intact.      Pupils: Pupils are equal, round, and reactive to light.   Cardiovascular:      Rate and Rhythm: Normal rate and regular rhythm.   Pulmonary:      Effort: Pulmonary effort is normal.      Breath sounds: Normal breath sounds.   Abdominal:      General: Abdomen is flat. Bowel sounds are normal. There is no distension.      Palpations: Abdomen is soft.   Musculoskeletal:         General: Normal range of motion.      Cervical  back: Normal range of motion.   Skin:     General: Skin is warm.   Neurological:      General: No focal deficit present.      Mental Status: She is alert and oriented to person, place, and time. Mental status is at baseline.   Psychiatric:         Mood and Affect: Mood normal.         Behavior: Behavior normal.         Thought Content: Thought content normal.         Judgment: Judgment normal.       Vents:  Oxygen Concentration (%): 2 (02/20/22 0800)    Lines/Drains/Airways       Peripheral Intravenous Line  Duration                  Midline Catheter Insertion/Assessment  - Single Lumen 02/20/22 1445 Right median cubital vein (antecubital fossa) 18g x 8cm 1 day         Midline Catheter Insertion/Assessment  - Single Lumen 02/20/22 2355 Right basilic vein (medial side of arm) 1 day         Peripheral IV - Single Lumen 02/21/22 1734 20 G Left Antecubital <1 day                    Significant Labs:    CBC/Anemia Profile:  Recent Labs   Lab 02/21/22  0341 02/21/22  1724 02/22/22  0330   WBC 8.07 7.51 7.25   HGB 7.1* 7.6* 7.2*   HCT 23.5* 23.8* 23.1*   * 143* 148*   * 100* 102*   RDW 15.9* 15.9* 16.1*          Chemistries:  Recent Labs   Lab 02/21/22  0341 02/22/22  0330   * 133*   K 4.2 4.1    100   CO2 25 25   BUN 17 11   CREATININE 1.3 0.9   CALCIUM 8.4* 9.0   ALBUMIN 2.6* 2.7*   PROT 5.3* 5.8*   BILITOT 1.1* 0.8   ALKPHOS 113 140*   ALT 13 12   AST 41* 51*   MG  --  2.0   PHOS  --  2.0*         All pertinent labs within the past 24 hours have been reviewed.    Significant Imaging:  I have reviewed all pertinent imaging results/findings within the past 24 hours.    Assessment/Plan:     * Hemorrhagic shock  Seems to be improving as vaginal bleeding is resolved.  - continue to follow BID CBC.  - transfuse to keep HgB >7  - target MAP >65mmHg.  remains off levophed.      JHONATAN (acute kidney injury)  Patient had JHONATAN on 1.9 on presentation (baseline 0.9).  - avoiding renal toxins.  - recheck daily,  now normalized  - ensure normotension and transfuse for acute blood loss.        Vagina bleeding  Improving s/p D&C with OBGYN.    Continue to monitor.    Assistance appreciated.    Pure hypercholesterolemia  Continue statin    Atrial Fibrillation (paroxysmal)  S/p 2 cardioversion.  She is now in sinus, but is concerned that she may need her anti-arrhythmics resumed soon.  - continue propafenone  - resume metoprolol.  Increase to home dose as tolerated.  - holding anticoagulation at this time. If BP is stable on home antihypertensives, and HgB uptrending, it would be reasonable to retry.  Will hold for now.    Essential hypertension  Previously holding antihypertensives given shock.  Will resume as indicated.  - off pressors.    - starting metoprolol today.      Holding nita Man MD  Pulmonology  [facility]

## 2022-02-22 NOTE — ASSESSMENT & PLAN NOTE
Patient had JHONATAN on 1.9 on presentation (baseline 0.9).  - avoiding renal toxins.  - recheck daily, now normalized  - ensure normotension and transfuse for acute blood loss.

## 2022-02-22 NOTE — SUBJECTIVE & OBJECTIVE
Interval History: patient has tolerated restarting of metoprolol and propafenone.  She is being recommended to resume anticoagulation, as possible.  HgB uptrending.  She is feeling fine.    Objective:     Vital Signs (Most Recent):  Temp: 98.1 °F (36.7 °C) (02/22/22 0800)  Pulse: 86 (02/22/22 1300)  Resp: (!) 32 (02/22/22 1300)  BP: (!) 105/58 (02/22/22 1300)  SpO2: (!) 94 % (02/22/22 1300)   Vital Signs (24h Range):  Temp:  [98.1 °F (36.7 °C)-98.4 °F (36.9 °C)] 98.1 °F (36.7 °C)  Pulse:  [] 86  Resp:  [13-52] 32  SpO2:  [88 %-97 %] 94 %  BP: ()/(50-83) 105/58     Weight: (!) 142.9 kg (315 lb)  Body mass index is 50.84 kg/m².      Intake/Output Summary (Last 24 hours) at 2/22/2022 1423  Last data filed at 2/22/2022 1200  Gross per 24 hour   Intake 740 ml   Output --   Net 740 ml         Physical Exam  Constitutional:       General: She is not in acute distress.     Appearance: Normal appearance. She is obese. She is not ill-appearing, toxic-appearing or diaphoretic.   HENT:      Head: Normocephalic.      Nose: Nose normal.      Mouth/Throat:      Mouth: Mucous membranes are moist.   Eyes:      Extraocular Movements: Extraocular movements intact.      Pupils: Pupils are equal, round, and reactive to light.   Cardiovascular:      Rate and Rhythm: Normal rate and regular rhythm.   Pulmonary:      Effort: Pulmonary effort is normal.      Breath sounds: Normal breath sounds.   Abdominal:      General: Abdomen is flat. Bowel sounds are normal. There is no distension.      Palpations: Abdomen is soft.   Musculoskeletal:         General: Normal range of motion.      Cervical back: Normal range of motion.   Skin:     General: Skin is warm.   Neurological:      General: No focal deficit present.      Mental Status: She is alert and oriented to person, place, and time. Mental status is at baseline.   Psychiatric:         Mood and Affect: Mood normal.         Behavior: Behavior normal.         Thought Content:  Thought content normal.         Judgment: Judgment normal.       Vents:  Oxygen Concentration (%): 2 (02/20/22 0800)    Lines/Drains/Airways       Peripheral Intravenous Line  Duration                  Midline Catheter Insertion/Assessment  - Single Lumen 02/20/22 1445 Right median cubital vein (antecubital fossa) 18g x 8cm 1 day         Midline Catheter Insertion/Assessment  - Single Lumen 02/20/22 2355 Right basilic vein (medial side of arm) 1 day         Peripheral IV - Single Lumen 02/21/22 1734 20 G Left Antecubital <1 day                    Significant Labs:    CBC/Anemia Profile:  Recent Labs   Lab 02/21/22  0341 02/21/22  1724 02/22/22  0330   WBC 8.07 7.51 7.25   HGB 7.1* 7.6* 7.2*   HCT 23.5* 23.8* 23.1*   * 143* 148*   * 100* 102*   RDW 15.9* 15.9* 16.1*          Chemistries:  Recent Labs   Lab 02/21/22  0341 02/22/22  0330   * 133*   K 4.2 4.1    100   CO2 25 25   BUN 17 11   CREATININE 1.3 0.9   CALCIUM 8.4* 9.0   ALBUMIN 2.6* 2.7*   PROT 5.3* 5.8*   BILITOT 1.1* 0.8   ALKPHOS 113 140*   ALT 13 12   AST 41* 51*   MG  --  2.0   PHOS  --  2.0*         All pertinent labs within the past 24 hours have been reviewed.    Significant Imaging:  I have reviewed all pertinent imaging results/findings within the past 24 hours.

## 2022-02-22 NOTE — PLAN OF CARE
Per discussion with patients EP cardiologist:    She should be on anticoagulation for 3 months after her ablation.  Due to her current situation she needs at least 6 weeks (march 7th).  She will be a candidate for possible watchman procedure when she recovers.  Restart eliquis as deemed safe to do so.    Will hold today as we resume antihypertensives/BBlockers.  Try to start eliquis tomorrow if OK with OB/GYN    Elvin Man MD  [facility]

## 2022-02-23 LAB
ALBUMIN SERPL BCP-MCNC: 2.8 G/DL (ref 3.5–5.2)
ALP SERPL-CCNC: 146 U/L (ref 55–135)
ALT SERPL W/O P-5'-P-CCNC: 12 U/L (ref 10–44)
ANION GAP SERPL CALC-SCNC: 10 MMOL/L (ref 8–16)
ANISOCYTOSIS BLD QL SMEAR: SLIGHT
AST SERPL-CCNC: 60 U/L (ref 10–40)
BASOPHILS # BLD AUTO: 0.04 K/UL (ref 0–0.2)
BASOPHILS # BLD AUTO: 0.08 K/UL (ref 0–0.2)
BASOPHILS NFR BLD: 0.5 % (ref 0–1.9)
BASOPHILS NFR BLD: 0.8 % (ref 0–1.9)
BILIRUB SERPL-MCNC: 0.7 MG/DL (ref 0.1–1)
BLD PROD TYP BPU: NORMAL
BLD PROD TYP BPU: NORMAL
BLOOD UNIT EXPIRATION DATE: NORMAL
BLOOD UNIT EXPIRATION DATE: NORMAL
BLOOD UNIT TYPE CODE: 9500
BLOOD UNIT TYPE CODE: 9500
BLOOD UNIT TYPE: NORMAL
BLOOD UNIT TYPE: NORMAL
BUN SERPL-MCNC: 10 MG/DL (ref 6–20)
CALCIUM SERPL-MCNC: 9 MG/DL (ref 8.7–10.5)
CHLORIDE SERPL-SCNC: 102 MMOL/L (ref 95–110)
CO2 SERPL-SCNC: 24 MMOL/L (ref 23–29)
CODING SYSTEM: NORMAL
CODING SYSTEM: NORMAL
CREAT SERPL-MCNC: 1 MG/DL (ref 0.5–1.4)
DACRYOCYTES BLD QL SMEAR: ABNORMAL
DIFFERENTIAL METHOD: ABNORMAL
DIFFERENTIAL METHOD: ABNORMAL
DISPENSE STATUS: NORMAL
DISPENSE STATUS: NORMAL
EOSINOPHIL # BLD AUTO: 0.4 K/UL (ref 0–0.5)
EOSINOPHIL # BLD AUTO: 0.4 K/UL (ref 0–0.5)
EOSINOPHIL NFR BLD: 3.4 % (ref 0–8)
EOSINOPHIL NFR BLD: 5.3 % (ref 0–8)
ERYTHROCYTE [DISTWIDTH] IN BLOOD BY AUTOMATED COUNT: 15.9 % (ref 11.5–14.5)
ERYTHROCYTE [DISTWIDTH] IN BLOOD BY AUTOMATED COUNT: 16.3 % (ref 11.5–14.5)
EST. GFR  (AFRICAN AMERICAN): >60 ML/MIN/1.73 M^2
EST. GFR  (NON AFRICAN AMERICAN): >60 ML/MIN/1.73 M^2
GLUCOSE SERPL-MCNC: 111 MG/DL (ref 70–110)
HAPTOGLOB SERPL-MCNC: 109 MG/DL (ref 30–250)
HCT VFR BLD AUTO: 17.7 % (ref 37–48.5)
HCT VFR BLD AUTO: 30.4 % (ref 37–48.5)
HGB BLD-MCNC: 10.1 G/DL (ref 12–16)
HGB BLD-MCNC: 5.8 G/DL (ref 12–16)
HYPOCHROMIA BLD QL SMEAR: ABNORMAL
IMM GRANULOCYTES # BLD AUTO: 0.1 K/UL (ref 0–0.04)
IMM GRANULOCYTES # BLD AUTO: 0.18 K/UL (ref 0–0.04)
IMM GRANULOCYTES NFR BLD AUTO: 1.2 % (ref 0–0.5)
IMM GRANULOCYTES NFR BLD AUTO: 1.7 % (ref 0–0.5)
LYMPHOCYTES # BLD AUTO: 1.4 K/UL (ref 1–4.8)
LYMPHOCYTES # BLD AUTO: 1.6 K/UL (ref 1–4.8)
LYMPHOCYTES NFR BLD: 13.2 % (ref 18–48)
LYMPHOCYTES NFR BLD: 20.1 % (ref 18–48)
MAGNESIUM SERPL-MCNC: 1.8 MG/DL (ref 1.6–2.6)
MCH RBC QN AUTO: 31.2 PG (ref 27–31)
MCH RBC QN AUTO: 32 PG (ref 27–31)
MCHC RBC AUTO-ENTMCNC: 32.8 G/DL (ref 32–36)
MCHC RBC AUTO-ENTMCNC: 33.2 G/DL (ref 32–36)
MCV RBC AUTO: 95 FL (ref 82–98)
MCV RBC AUTO: 96 FL (ref 82–98)
MONOCYTES # BLD AUTO: 0.8 K/UL (ref 0.3–1)
MONOCYTES # BLD AUTO: 0.9 K/UL (ref 0.3–1)
MONOCYTES NFR BLD: 10 % (ref 4–15)
MONOCYTES NFR BLD: 8.9 % (ref 4–15)
NEUTROPHILS # BLD AUTO: 5.1 K/UL (ref 1.8–7.7)
NEUTROPHILS # BLD AUTO: 7.6 K/UL (ref 1.8–7.7)
NEUTROPHILS NFR BLD: 62.9 % (ref 38–73)
NEUTROPHILS NFR BLD: 72 % (ref 38–73)
NRBC BLD-RTO: 1 /100 WBC
NRBC BLD-RTO: 1 /100 WBC
PHOSPHATE SERPL-MCNC: 2 MG/DL (ref 2.7–4.5)
PLATELET # BLD AUTO: 147 K/UL (ref 150–450)
PLATELET # BLD AUTO: 164 K/UL (ref 150–450)
PLATELET BLD QL SMEAR: ABNORMAL
PMV BLD AUTO: 10.7 FL (ref 9.2–12.9)
PMV BLD AUTO: 10.8 FL (ref 9.2–12.9)
POIKILOCYTOSIS BLD QL SMEAR: SLIGHT
POLYCHROMASIA BLD QL SMEAR: ABNORMAL
POTASSIUM SERPL-SCNC: 4.7 MMOL/L (ref 3.5–5.1)
PROT SERPL-MCNC: 6.2 G/DL (ref 6–8.4)
RBC # BLD AUTO: 1.86 M/UL (ref 4–5.4)
RBC # BLD AUTO: 3.16 M/UL (ref 4–5.4)
SODIUM SERPL-SCNC: 136 MMOL/L (ref 136–145)
SPHEROCYTES BLD QL SMEAR: ABNORMAL
TOXIC GRANULES BLD QL SMEAR: PRESENT
TRANS ERYTHROCYTES VOL PATIENT: NORMAL ML
TRANS ERYTHROCYTES VOL PATIENT: NORMAL ML
WBC # BLD AUTO: 10.6 K/UL (ref 3.9–12.7)
WBC # BLD AUTO: 8.07 K/UL (ref 3.9–12.7)

## 2022-02-23 PROCEDURE — P9021 RED BLOOD CELLS UNIT: HCPCS | Performed by: INTERNAL MEDICINE

## 2022-02-23 PROCEDURE — 11000001 HC ACUTE MED/SURG PRIVATE ROOM

## 2022-02-23 PROCEDURE — 25000003 PHARM REV CODE 250: Performed by: STUDENT IN AN ORGANIZED HEALTH CARE EDUCATION/TRAINING PROGRAM

## 2022-02-23 PROCEDURE — 83735 ASSAY OF MAGNESIUM: CPT | Performed by: GENERAL ACUTE CARE HOSPITAL

## 2022-02-23 PROCEDURE — 36415 COLL VENOUS BLD VENIPUNCTURE: CPT | Performed by: INTERNAL MEDICINE

## 2022-02-23 PROCEDURE — 36430 TRANSFUSION BLD/BLD COMPNT: CPT

## 2022-02-23 PROCEDURE — 85025 COMPLETE CBC W/AUTO DIFF WBC: CPT | Mod: 91 | Performed by: INTERNAL MEDICINE

## 2022-02-23 PROCEDURE — 83010 ASSAY OF HAPTOGLOBIN QUANT: CPT | Performed by: GENERAL ACUTE CARE HOSPITAL

## 2022-02-23 PROCEDURE — 25000003 PHARM REV CODE 250: Performed by: INTERNAL MEDICINE

## 2022-02-23 PROCEDURE — 84100 ASSAY OF PHOSPHORUS: CPT | Performed by: GENERAL ACUTE CARE HOSPITAL

## 2022-02-23 PROCEDURE — 80053 COMPREHEN METABOLIC PANEL: CPT | Performed by: GENERAL ACUTE CARE HOSPITAL

## 2022-02-23 PROCEDURE — 85025 COMPLETE CBC W/AUTO DIFF WBC: CPT | Performed by: INTERNAL MEDICINE

## 2022-02-23 PROCEDURE — 99233 SBSQ HOSP IP/OBS HIGH 50: CPT | Mod: ,,, | Performed by: INTERNAL MEDICINE

## 2022-02-23 PROCEDURE — 99233 PR SUBSEQUENT HOSPITAL CARE,LEVL III: ICD-10-PCS | Mod: ,,, | Performed by: INTERNAL MEDICINE

## 2022-02-23 PROCEDURE — 36415 COLL VENOUS BLD VENIPUNCTURE: CPT | Performed by: GENERAL ACUTE CARE HOSPITAL

## 2022-02-23 PROCEDURE — 25000003 PHARM REV CODE 250: Performed by: PHYSICIAN ASSISTANT

## 2022-02-23 PROCEDURE — 25000003 PHARM REV CODE 250: Performed by: CLINICAL NURSE SPECIALIST

## 2022-02-23 RX ORDER — ONDANSETRON 2 MG/ML
4 INJECTION INTRAMUSCULAR; INTRAVENOUS EVERY 6 HOURS PRN
Status: DISCONTINUED | OUTPATIENT
Start: 2022-02-24 | End: 2022-02-27 | Stop reason: HOSPADM

## 2022-02-23 RX ORDER — MEDROXYPROGESTERONE ACETATE 10 MG/1
20 TABLET ORAL 3 TIMES DAILY
Status: DISCONTINUED | OUTPATIENT
Start: 2022-02-23 | End: 2022-02-27 | Stop reason: HOSPADM

## 2022-02-23 RX ORDER — HYDROCODONE BITARTRATE AND ACETAMINOPHEN 500; 5 MG/1; MG/1
TABLET ORAL
Status: DISCONTINUED | OUTPATIENT
Start: 2022-02-23 | End: 2022-02-27 | Stop reason: HOSPADM

## 2022-02-23 RX ADMIN — MEDROXYPROGESTERONE ACETATE 20 MG: 10 TABLET ORAL at 09:02

## 2022-02-23 RX ADMIN — ALPRAZOLAM 0.5 MG: 0.5 TABLET ORAL at 09:02

## 2022-02-23 RX ADMIN — ATORVASTATIN CALCIUM 40 MG: 20 TABLET, FILM COATED ORAL at 09:02

## 2022-02-23 RX ADMIN — MUPIROCIN: 20 OINTMENT TOPICAL at 09:02

## 2022-02-23 RX ADMIN — DULOXETINE 120 MG: 60 CAPSULE, DELAYED RELEASE ORAL at 09:02

## 2022-02-23 RX ADMIN — METOPROLOL SUCCINATE 50 MG: 50 TABLET, EXTENDED RELEASE ORAL at 09:02

## 2022-02-23 RX ADMIN — PANTOPRAZOLE SODIUM 40 MG: 40 TABLET, DELAYED RELEASE ORAL at 09:02

## 2022-02-23 RX ADMIN — ACETAMINOPHEN 650 MG: 325 TABLET ORAL at 09:02

## 2022-02-23 RX ADMIN — TRAZODONE HYDROCHLORIDE 100 MG: 50 TABLET ORAL at 09:02

## 2022-02-23 RX ADMIN — PROPAFENONE HYDROCHLORIDE 225 MG: 150 TABLET, FILM COATED ORAL at 09:02

## 2022-02-23 RX ADMIN — PROPAFENONE HYDROCHLORIDE 225 MG: 150 TABLET, FILM COATED ORAL at 05:02

## 2022-02-23 RX ADMIN — POLYETHYLENE GLYCOL 3350 17 G: 17 POWDER, FOR SOLUTION ORAL at 09:02

## 2022-02-23 NOTE — HOSPITAL COURSE
2/18:  Patient was admitted for hemorrhagic shock due to vaginal bleeding was transfused 1 unit PRBCs.  Also started on low-dose norepinephrine infusion.  Gynecology consulted and started estrogen  2/19:  Was on low doses of norepinephrine infusion this morning but was stopped.  Stable H/H. Continued vaginal bleeding.  Start trial 1 L normal saline infusion for acute renal failure and shock.  
2/18:  Patient was admitted for hemorrhagic shock due to vaginal bleeding was transfused 1 unit PRBCs.  Also started on low-dose norepinephrine infusion.  Gynecology consulted and started estrogen  2/19:  Was on low doses of norepinephrine infusion this morning but was stopped.  Stable H/H. Continued vaginal bleeding.  Start trial 1 L normal saline infusion for acute renal failure and shock.  2/20: bleeding resolved following GYN intervention.  2/21: pressors removed without issue.  
Patient was admitted to ICU 2/18 for hemorrhagic shock due to vaginal bleeding was transfused 1 unit PRBCs.  Also started on low-dose norepinephrine infusion.  Gynecology consulted and started estrogen. Low doses of norepinephrine infusion weaned off.  Continued to have vaginal bleeding. Gync consulted s/p D&C for vaginal bleeding. She was given PRN blood products. Gyn started oral provera treatment in the interim to keep bleeding stable; 20 mg TID. No signs of post procedure endometritis, no need for abx. Stepped down to  2/22. Post step down her Hgb drooped to 5.8 , HDS, given 2 units pRBC. Gyn following. No other source of bleed identified at this time.     Transferred to  on 1/23, transfused an additional 2u pRBCs with good recovery. GYN evaluated pt, and she was started on provera with good effect and dramatic improvement/near resolution in vaginal bleeding into 2/24.  EP cardiology consulted and recommended resuming apixaban vs. alternative medication.  Patient tolerating apixaban with stable Hgb at discharge.  
80

## 2022-02-23 NOTE — HPI
Vicky Alvarado is a 56 year old female with HTN and history of atrial fibrillation s/p ablation 2 weeks ago (still on eliquis) who presented to [facility] ED on 2/18/22 complaining of vaginal bleeding for past 2 days. Patient reports she started noticed significant vaginal bleeding and pass clots on 2/16. Described some clots as the size of oranges. Reports associated abdominal cramping with vaginal bleeding. She is having to wear two large menstrual pads and changing them frequently. Standing up causes her to bleed more. Patient mentions that she has not menstruated in the last 5 years. Other associated symptoms included fatigue, dizziness, lightheadedness and NAPIER. Denies fever, chills, cough, chest pain, or sick contacts. Reports taking her last dose of eliquis on evening of 2/17 and took her antihypertensive medications this morning.      In the ED, patient found to be hypotensive with active vaginal hemorrhage. Hemoglobin at that time was 10.7.1 unit of emergency blood given. Gynecology consulted and recommending IV estrogen. Critical care medicine consulted for hemorrhagic shock.

## 2022-02-23 NOTE — SUBJECTIVE & OBJECTIVE
Interval History: Stepped down to medicine overnight. No new issues overnight. This morning hgb 5.8. Patient started she was still having vaginal bleeding, and malodorous discharge. Gyn re consulted. Started provera. No other issues. Weak . Getting 2 units pRBC today     Review of Systems:  Constitutional: no significant weight change, fever, fatigue  Eyes:  No vision changes  Cardiovascular: No chest pain  Respiratory: No shortness of breath or cough  Gastrointestinal: No diarrhea, bloody stool, nausea/vomiting, constipation  Genitourinary: No blood in urine, painful urination, urgency of urination, frequency of urination; vaginal blood loss and discharge  Skin/Breast: No painful breasts, nipple discharge, masses  Neurological: No headache  Endocrine: No hot flushes  Psychiatric: No depression or anxiety    Objective:     Vital Signs (Most Recent):  Temp: 97.7 °F (36.5 °C) (02/23/22 1130)  Pulse: 66 (02/23/22 1130)  Resp: 18 (02/23/22 1130)  BP: 114/69 (02/23/22 1130)  SpO2: 96 % (02/23/22 1130) Vital Signs (24h Range):  Temp:  [97.5 °F (36.4 °C)-98.3 °F (36.8 °C)] 97.7 °F (36.5 °C)  Pulse:  [66-85] 66  Resp:  [18-33] 18  SpO2:  [92 %-96 %] 96 %  BP: ()/(54-70) 114/69     Weight: (!) 139.3 kg (307 lb)  Body mass index is 49.55 kg/m².    Intake/Output Summary (Last 24 hours) at 2/23/2022 1424  Last data filed at 2/23/2022 0626  Gross per 24 hour   Intake 350 ml   Output --   Net 350 ml      Physical Exam  Constitutional:       General: She is not in acute distress.     Appearance: Normal appearance. She is obese. She is not ill-appearing, toxic-appearing or diaphoretic.   HENT:      Head: Normocephalic.      Nose: Nose normal.      Mouth/Throat:      Mouth: Mucous membranes are moist.   Eyes:      Extraocular Movements: Extraocular movements intact.      Pupils: Pupils are equal, round, and reactive to light.   Cardiovascular:      Rate and Rhythm: Normal rate and regular rhythm.   Pulmonary:      Effort:  Pulmonary effort is normal.      Breath sounds: Normal breath sounds.   Abdominal:      General: Abdomen is flat. Bowel sounds are normal. There is no distension.      Palpations: Abdomen is soft.   Musculoskeletal:         General: Normal range of motion.      Cervical back: Normal range of motion.   Skin:     General: Skin is warm.   Neurological:      General: No focal deficit present.      Mental Status: She is alert and oriented to person, place, and time. Mental status is at baseline.   Psychiatric:         Mood and Affect: Mood normal.         Behavior: Behavior normal.         Thought Content: Thought content normal.         Judgment: Judgment normal.     Significant Labs: All pertinent labs within the past 24 hours have been reviewed.    Significant Imaging: I have reviewed all pertinent imaging results/findings within the past 24 hours.

## 2022-02-23 NOTE — ASSESSMENT & PLAN NOTE
- from vaginal bleeding, required multiple units of pRBC, and low dose pressors  - now off pressors and BP normotensive  - s/p D/C as per gyn   - still having mild vaginal bleeding, but resolving  - Hgb 5.8 today, no other sources identified  - PRN transfusion  - provera as per gyn  - gyn is following, appreciate recs

## 2022-02-23 NOTE — PROGRESS NOTES
[facility] Medicine  Progress Note    Patient Name: Vicky Alvarado  MRN: [id number]  Patient Class: IP- Inpatient   Admission Date: 2/18/2022  Length of Stay: 5 days  Attending Physician: Jorje Moreno MD  Primary Care Provider: Gordy Cordero MD        Subjective:     Principal Problem:Hemorrhagic shock        HPI:  Vicky Alvarado is a 56 year old female with HTN and history of atrial fibrillation s/p ablation 2 weeks ago (still on eliquis) who presented to [facility] ED on 2/18/22 complaining of vaginal bleeding for past 2 days. Patient reports she started noticed significant vaginal bleeding and pass clots on 2/16. Described some clots as the size of oranges. Reports associated abdominal cramping with vaginal bleeding. She is having to wear two large menstrual pads and changing them frequently. Standing up causes her to bleed more. Patient mentions that she has not menstruated in the last 5 years. Other associated symptoms included fatigue, dizziness, lightheadedness and NAPIER. Denies fever, chills, cough, chest pain, or sick contacts. Reports taking her last dose of eliquis on evening of 2/17 and took her antihypertensive medications this morning.      In the ED, patient found to be hypotensive with active vaginal hemorrhage. Hemoglobin at that time was 10.7.1 unit of emergency blood given. Gynecology consulted and recommending IV estrogen. Critical care medicine consulted for hemorrhagic shock.       Overview/Hospital Course:  Patient was admitted to ICU 2/18 for hemorrhagic shock due to vaginal bleeding was transfused 1 unit PRBCs.  Also started on low-dose norepinephrine infusion.  Gynecology consulted and started estrogen. Low doses of norepinephrine infusion weaned off.  Continued to have vaginal bleeding. Gync consulted s/p D&C for vaginal bleeding. She was given PRN blood products. Gyn started oral provera treatment in the interim to keep bleeding stable; 20 mg TID. No signs of post  procedure endometritis, no need for abx. Stepped down to  2/22. Post step down her Hgb drooped to 5.8 , HDS, given 2 units pRBC. Gyn following. No other source of bleed identified at this time.       Interval History: Stepped down to medicine overnight. No new issues overnight. This morning hgb 5.8. Patient started she was still having vaginal bleeding, and malodorous discharge. Gyn re consulted. Started provera. No other issues. Weak . Getting 2 units pRBC today     Review of Systems:  Constitutional: no significant weight change, fever, fatigue  Eyes:  No vision changes  Cardiovascular: No chest pain  Respiratory: No shortness of breath or cough  Gastrointestinal: No diarrhea, bloody stool, nausea/vomiting, constipation  Genitourinary: No blood in urine, painful urination, urgency of urination, frequency of urination; vaginal blood loss and discharge  Skin/Breast: No painful breasts, nipple discharge, masses  Neurological: No headache  Endocrine: No hot flushes  Psychiatric: No depression or anxiety    Objective:     Vital Signs (Most Recent):  Temp: 97.7 °F (36.5 °C) (02/23/22 1130)  Pulse: 66 (02/23/22 1130)  Resp: 18 (02/23/22 1130)  BP: 114/69 (02/23/22 1130)  SpO2: 96 % (02/23/22 1130) Vital Signs (24h Range):  Temp:  [97.5 °F (36.4 °C)-98.3 °F (36.8 °C)] 97.7 °F (36.5 °C)  Pulse:  [66-85] 66  Resp:  [18-33] 18  SpO2:  [92 %-96 %] 96 %  BP: ()/(54-70) 114/69     Weight: (!) 139.3 kg (307 lb)  Body mass index is 49.55 kg/m².    Intake/Output Summary (Last 24 hours) at 2/23/2022 1424  Last data filed at 2/23/2022 0626  Gross per 24 hour   Intake 350 ml   Output --   Net 350 ml      Physical Exam  Constitutional:       General: She is not in acute distress.     Appearance: Normal appearance. She is obese. She is not ill-appearing, toxic-appearing or diaphoretic.   HENT:      Head: Normocephalic.      Nose: Nose normal.      Mouth/Throat:      Mouth: Mucous membranes are moist.   Eyes:      Extraocular  Movements: Extraocular movements intact.      Pupils: Pupils are equal, round, and reactive to light.   Cardiovascular:      Rate and Rhythm: Normal rate and regular rhythm.   Pulmonary:      Effort: Pulmonary effort is normal.      Breath sounds: Normal breath sounds.   Abdominal:      General: Abdomen is flat. Bowel sounds are normal. There is no distension.      Palpations: Abdomen is soft.   Musculoskeletal:         General: Normal range of motion.      Cervical back: Normal range of motion.   Skin:     General: Skin is warm.   Neurological:      General: No focal deficit present.      Mental Status: She is alert and oriented to person, place, and time. Mental status is at baseline.   Psychiatric:         Mood and Affect: Mood normal.         Behavior: Behavior normal.         Thought Content: Thought content normal.         Judgment: Judgment normal.     Significant Labs: All pertinent labs within the past 24 hours have been reviewed.    Significant Imaging: I have reviewed all pertinent imaging results/findings within the past 24 hours.      Assessment/Plan:      * Hemorrhagic shock  - from vaginal bleeding, required multiple units of pRBC, and low dose pressors  - now off pressors and BP normotensive  - s/p D/C as per gyn   - still having mild vaginal bleeding, but resolving  - Hgb 5.8 today, no other sources identified  - PRN transfusion  - provera as per gyn  - gyn is following, appreciate recs      JHONATAN (acute kidney injury)  - Patient had JHONATAN on 1.9 on presentation (baseline 0.9).  - prerenal , now resolved       Vagina bleeding  - see above      Pure hypercholesterolemia  - resume home meds      Severe obesity (BMI >= 40)  Body mass index is 49.55 kg/m². Morbid obesity complicates all aspects of disease management from diagnostic modalities to treatment. Weight loss encouraged and health benefits explained to patient.         Atrial Fibrillation (paroxysmal)  - S/p 2 cardioversion.  She is now in sinus,  but is concerned that she may need her anti-arrhythmics resumed soon.  - continue propafenone  - resume metoprolol.  Increase to home dose as tolerated.  - holding anticoagulation at this time with anemia requiring transfusion, resume when able        Essential hypertension  - resume BB, resume rest when able        VTE Risk Mitigation (From admission, onward)         Ordered     IP VTE HIGH RISK PATIENT  Once         02/19/22 1655     Place sequential compression device  Until discontinued         02/19/22 1655     Place AL hose  Until discontinued         02/19/22 1655                Discharge Planning   RAYO: 2/25/2022     Code Status: Full Code   Is the patient medically ready for discharge?:     Reason for patient still in hospital (select all that apply): Patient unstable  Discharge Plan A: Home Health            Jorje Moreno MD  Department of Hospital Medicine   [facility]

## 2022-02-23 NOTE — PLAN OF CARE
Pt is AAOx4. Pt remain free from fall and injuries. VS remain stable. Questions and concerns voiced and answered. Medication compliance. Call light in reach. Bed in low locked position. Side rails x2. Belongings at bedside.

## 2022-02-23 NOTE — ASSESSMENT & PLAN NOTE
- S/p 2 cardioversion.  She is now in sinus, but is concerned that she may need her anti-arrhythmics resumed soon.  - continue propafenone  - resume metoprolol.  Increase to home dose as tolerated.  - holding anticoagulation at this time with anemia requiring transfusion, resume when able

## 2022-02-23 NOTE — PT/OT/SLP PROGRESS
Physical Therapy      Patient Name:  Vicky Alvarado   MRN:  [id number]    Patient not seen today secondary to declined, stated she does not feel she needs PT, reports she is walking fine also that she is getting 2 units of blood soon. Will discuss with PT probable DC. Will follow-up when PT scheduled.

## 2022-02-23 NOTE — ASSESSMENT & PLAN NOTE
Body mass index is 49.55 kg/m². Morbid obesity complicates all aspects of disease management from diagnostic modalities to treatment. Weight loss encouraged and health benefits explained to patient.

## 2022-02-23 NOTE — CONSULTS
Consult Note  Gynecology    Consult Requested By: Hospital Medicine  Reason for Consult: Vaginal bleeding     SUBJECTIVE:     History of Present Illness:  Vicky Alvarado is a 56 y.o. female known to the gyn team. Gyn consulted Friday 2/18 for vaginal bleeding in setting of anticoagulation post cardiac ablation. Patient was treated with estrogen without improvement and subsequently underwent D&C 2/19 which resolved bleeding.  Gynecology has been reconsulted to evalaute for persistent bleeding and patient report of vaginal odor.     Patient reports bleeding remains minimal since procedure with only pink discharge when wiping. She reports noting some brownish malodorous discharge on her pad this morning but denies any heavy vaginal bleeding. Has been ambulating without issue and without shortness of breath/dizziness/lightheadedness. Denies any abdominal or pelvic pain.  She has no other complaints at this time.     Review of patient's allergies indicates:   Allergen Reactions    Flecainide Shortness Of Breath and Swelling     Past Medical History:   Diagnosis Date    Anticoagulant long-term use     Arthritis     Atrial fibrillation     cardioversion    Atrial fibrillation with RVR     Avascular necrosis     L hand    CHF (congestive heart failure)     Depression     Encounter for blood transfusion 07/22/2020    GERD (gastroesophageal reflux disease)     Hx of psychiatric care     Hypertension     Obese     Psychiatric problem     Sleep apnea      Past Surgical History:   Procedure Laterality Date    ABLATION OF ARRHYTHMOGENIC FOCUS FOR ATRIAL FIBRILLATION N/A 7/20/2020    Procedure: Ablation atrial fibrillation;  Surgeon: Gabriel Hawley MD;  Location: [facility] EP LAB;  Service: Cardiology;  Laterality: N/A;  afib, PVI, RFA, REENA, SHADY, anes, MB, 3 Prep    ABLATION OF ARRHYTHMOGENIC FOCUS FOR ATRIAL FIBRILLATION N/A 1/24/2022    Procedure: Ablation atrial fibrillation;  Surgeon: Gabriel SINGH  MD Chandan;  Location: [facility] EP LAB;  Service: Cardiology;  Laterality: N/A;  afib/afl, PVI (re-do)/CTI, RFA, REENA (cx if SR), SHADY, aneALIYA stokes, 3 Prep    APPLICATION OF WOUND VACUUM-ASSISTED CLOSURE DEVICE Left 8/3/2020    Procedure: APPLICATION, WOUND VAC;  Surgeon: SEMAJ Márquez III, MD;  Location: [facility] OR 2ND FLR;  Service: Peripheral Vascular;  Laterality: Left;    APPLICATION OF WOUND VACUUM-ASSISTED CLOSURE DEVICE Left 8/6/2020    Procedure: APPLICATION, WOUND VAC;  Surgeon: SEMAJ Márquez III, MD;  Location: [facility] OR 2ND FLR;  Service: Peripheral Vascular;  Laterality: Left;    CARPAL TUNNEL RELEASE Right 6/10/2020    Procedure: RELEASE, CARPAL TUNNEL - RIGHT;  Surgeon: Adelaida Hall MD;  [facility] OR;  Service: Orthopedics;  Laterality: Right;  GENERAL AND REGIONAL    CARPAL TUNNEL RELEASE Left 5/5/2021    Procedure: RELEASE, CARPAL TUNNEL, LEFT;  Surgeon: Adelaida Hall MD;  [facility]: [facility];  Service: Orthopedics;  Laterality: Left;  GENERAL & REGIONAL IN PACU    CLOSURE OF WOUND Left 8/6/2020    Procedure: CLOSURE, WOUND;  Surgeon: SEMAJ Márquez III, MD;  [facility];  Service: Peripheral Vascular;  Laterality: Left;  Complex    COLONOSCOPY N/A 8/17/2021    Procedure: COLONOSCOPY Suprep;  Surgeon: Loco Deluca MD;  Location: [facility];  Service: Endoscopy;  Laterality: N/A;    ESOPHAGOGASTRODUODENOSCOPY N/A 8/17/2021    Procedure: EGD (ESOPHAGOGASTRODUODENOSCOPY);  Surgeon: Loco Deluca MD;  Location: [facility];  Service: Endoscopy;  Laterality: N/A;  Patient is schedule to have her Covid test on 08/14/2021 at the [facility] @ 9:30am. [place].    EXPLORATION OF FEMORAL ARTERY Left 7/21/2020    Procedure: EXPLORATION, ARTERY, FEMORAL;  Surgeon: SEMAJ Márquez III, MD;  [facility];  Service: Peripheral Vascular;  Laterality: Left;  1. Urgent Direct repair, L SFA branch laceration    FOOT SURGERY      HYSTEROSCOPY WITH  DILATION AND CURETTAGE OF UTERUS N/A 2022    Procedure: HYSTEROSCOPY, WITH DILATION AND CURETTAGE OF UTERUS;  Surgeon: Shane Palomo MD;  Location: [facility] OR [address] [place] [address];  Service: OB/GYN;  Laterality: N/A;    TREATMENT OF CARDIAC ARRHYTHMIA N/A 2020    Procedure: CARDIOVERSION;  Surgeon: Gabriel Hawley MD;  Location: [facility] EP LAB;  Service: Cardiology;  Laterality: N/A;  af, demi, dccv, anes, mb, 345    TREATMENT OF CARDIAC ARRHYTHMIA  2022    Procedure: Cardioversion or Defibrillation;  Surgeon: Gabriel Hawley MD;  Location: [facility] EP LAB;  Service: Cardiology;;    WOUND DEBRIDEMENT Left 2020    Procedure: DEBRIDEMENT, WOUND;  Surgeon: SEMAJ Márquez III, MD;  Location: [facility] OR [address] [place] [address];  Service: Peripheral Vascular;  Laterality: Left;     OB History    No obstetric history on file.       Family History   Problem Relation Age of Onset    Cataracts Mother     Hypertension Mother     Thyroid disease Mother     Diabetes Father     Hypertension Father     Hypertension Sister     Hypertension Brother     Amblyopia Neg Hx     Blindness Neg Hx     Cancer Neg Hx     Glaucoma Neg Hx     Macular degeneration Neg Hx     Retinal detachment Neg Hx     Strabismus Neg Hx     Stroke Neg Hx      Social History     Socioeconomic History    Marital status: Single   Tobacco Use    Smoking status: Former Smoker     Types: Cigarettes     Quit date: 2019     Years since quittin.6    Smokeless tobacco: Never Used   Substance and Sexual Activity    Alcohol use: No    Drug use: No     Current Facility-Administered Medications   Medication    0.9%  NaCl infusion (for blood administration)    acetaminophen tablet 650 mg    ALPRAZolam tablet 0.5 mg    atorvastatin tablet 40 mg    carboprost injection 250 mcg    conjugated estrogens (PREMARIN) 25 mg in sodium chloride 0.9% 50 mL IVPB    DULoxetine DR capsule 120 mg    metoprolol succinate (TOPROL-XL) 24 hr tablet 50 mg    miSOPROStoL  tablet 800 mcg    mupirocin 2 % ointment    pantoprazole EC tablet 40 mg    polyethylene glycol packet 17 g    propafenone tablet 225 mg    sodium chloride 0.9% flush 10 mL    traZODone tablet 100 mg     Facility-Administered Medications Ordered in Other Encounters   Medication    sodium chloride 0.9% bolus 1,000 mL       Review of Systems:  Constitutional: no significant weight change, fever, fatigue  Eyes:  No vision changes  Cardiovascular: No chest pain  Respiratory: No shortness of breath or cough  Gastrointestinal: No diarrhea, bloody stool, nausea/vomiting, constipation  Genitourinary: No blood in urine, painful urination, urgency of urination, frequency of urination; minimal pink vaginal discharge  Skin/Breast: No painful breasts, nipple discharge, masses  Neurological: No headache  Endocrine: No hot flushes  Psychiatric: No depression or anxiety     OBJECTIVE:     Vital Signs  Temp:  [97.5 °F (36.4 °C)-98.3 °F (36.8 °C)] 97.7 °F (36.5 °C)  Pulse:  [66-87] 66  Resp:  [18-33] 18  SpO2:  [92 %-96 %] 96 %  BP: ()/(54-70) 114/69    Physical Exam:  Gen: A&Ox3, NAD, obese  CV: RRR  Pulm: LCTAB  Abd: Soft, non-distended, non-tender to palpation without rebound or guarding  Ext: PPP, no peripheral edema  External genitalia: WNL  Urethra and Meatus: WNL  Vagina: Moist, pink, normal ruggae, minimal pinkish discharge from os; no odor noted    Cervix: WNL, no CMT  Uterus:  Difficult to palpate secondary to body habitus but no tenderness on exam  Adnexa: No masses, no TTP     Laboratory  Recent Results (from the past 24 hour(s))   CBC auto differential    Collection Time: 02/22/22  6:18 PM   Result Value Ref Range    WBC 8.16 3.90 - 12.70 K/uL    RBC 2.43 (L) 4.00 - 5.40 M/uL    Hemoglobin 7.9 (L) 12.0 - 16.0 g/dL    Hematocrit 23.6 (L) 37.0 - 48.5 %    MCV 97 82 - 98 fL    MCH 32.5 (H) 27.0 - 31.0 pg    MCHC 33.5 32.0 - 36.0 g/dL    RDW 16.1 (H) 11.5 - 14.5 %    Platelets 158 150 - 450 K/uL    MPV 11.2 9.2  - 12.9 fL    Immature Granulocytes 1.3 (H) 0.0 - 0.5 %    Gran # (ANC) 5.6 1.8 - 7.7 K/uL    Immature Grans (Abs) 0.11 (H) 0.00 - 0.04 K/uL    Lymph # 1.3 1.0 - 4.8 K/uL    Mono # 0.7 0.3 - 1.0 K/uL    Eos # 0.3 0.0 - 0.5 K/uL    Baso # 0.06 0.00 - 0.20 K/uL    nRBC 1 (A) 0 /100 WBC    Gran % 68.5 38.0 - 73.0 %    Lymph % 16.4 (L) 18.0 - 48.0 %    Mono % 8.9 4.0 - 15.0 %    Eosinophil % 4.2 0.0 - 8.0 %    Basophil % 0.7 0.0 - 1.9 %    Differential Method Automated    Comprehensive Metabolic Panel    Collection Time: 02/23/22  3:02 AM   Result Value Ref Range    Sodium 136 136 - 145 mmol/L    Potassium 4.7 3.5 - 5.1 mmol/L    Chloride 102 95 - 110 mmol/L    CO2 24 23 - 29 mmol/L    Glucose 111 (H) 70 - 110 mg/dL    BUN 10 6 - 20 mg/dL    Creatinine 1.0 0.5 - 1.4 mg/dL    Calcium 9.0 8.7 - 10.5 mg/dL    Total Protein 6.2 6.0 - 8.4 g/dL    Albumin 2.8 (L) 3.5 - 5.2 g/dL    Total Bilirubin 0.7 0.1 - 1.0 mg/dL    Alkaline Phosphatase 146 (H) 55 - 135 U/L    AST 60 (H) 10 - 40 U/L    ALT 12 10 - 44 U/L    Anion Gap 10 8 - 16 mmol/L    eGFR if African American >60.0 >60 mL/min/1.73 m^2    eGFR if non African American >60.0 >60 mL/min/1.73 m^2   Phosphorus    Collection Time: 02/23/22  3:02 AM   Result Value Ref Range    Phosphorus 2.0 (L) 2.7 - 4.5 mg/dL   Magnesium    Collection Time: 02/23/22  3:02 AM   Result Value Ref Range    Magnesium 1.8 1.6 - 2.6 mg/dL   CBC auto differential    Collection Time: 02/23/22  8:07 AM   Result Value Ref Range    WBC 8.07 3.90 - 12.70 K/uL    RBC 1.86 (L) 4.00 - 5.40 M/uL    Hemoglobin 5.8 (LL) 12.0 - 16.0 g/dL    Hematocrit 17.7 (LL) 37.0 - 48.5 %    MCV 95 82 - 98 fL    MCH 31.2 (H) 27.0 - 31.0 pg    MCHC 32.8 32.0 - 36.0 g/dL    RDW 15.9 (H) 11.5 - 14.5 %    Platelets 164 150 - 450 K/uL    MPV 10.8 9.2 - 12.9 fL    Immature Granulocytes 1.2 (H) 0.0 - 0.5 %    Gran # (ANC) 5.1 1.8 - 7.7 K/uL    Immature Grans (Abs) 0.10 (H) 0.00 - 0.04 K/uL    Lymph # 1.6 1.0 - 4.8 K/uL    Mono # 0.8  0.3 - 1.0 K/uL    Eos # 0.4 0.0 - 0.5 K/uL    Baso # 0.04 0.00 - 0.20 K/uL    nRBC 1 (A) 0 /100 WBC    Gran % 62.9 38.0 - 73.0 %    Lymph % 20.1 18.0 - 48.0 %    Mono % 10.0 4.0 - 15.0 %    Eosinophil % 5.3 0.0 - 8.0 %    Basophil % 0.5 0.0 - 1.9 %    Platelet Estimate Appears normal     Aniso Slight     Poik Slight     Poly Occasional     Hypo Occasional     Tear Drop Cells Occasional     Spherocytes Occasional     Toxic Granulation Present     Differential Method Automated        Diagnostic Results:  N/a     ASSESSMENT/PLAN:     Active Hospital Problems    Diagnosis  POA    *Hemorrhagic shock [R57.8]  Yes    Vagina bleeding [N93.9]  Yes    JHONATAN (acute kidney injury) [N17.9]  Yes    Pure hypercholesterolemia [E78.00]  Yes     Chronic    Atrial Fibrillation (paroxysmal) [I48.0]  Yes    Severe obesity (BMI >= 40) [E66.01]  Yes    Essential hypertension [I10]  Yes     Chronic      Resolved Hospital Problems   No resolved problems to display.       Vicky Alvarado is a 56 y.o. female s/p cardiac ablation with vaginal bleeding       1. Vaginal bleeding   - now POD#4 from D&C for vaginal bleeding   - per patient and confirmed on exam bleeding remains minimal since D&C; no intervention needed    - no odor noted on exam and no exam findings consistent with post procedure endometritis   - cannot account for 5/17 h/h from this AM; does not appear to be the result of persistent vaginal bleeding   - would evaluate for other sources of bleeding including GI or urinary tract   - in terms of vaginal bleeding and need for long term anticoagulation,  patient will likely ultimately need hysterectomy but would need medical optimization prior to that   - given her significant comorbid conditions a minimally invasive approach for hysterectomy would be best   - recommend oral provera treatment in the interim to keep bleeding stable; 20 mg TID should be ordered     Thank you for your consult. Patient has been set up for  outpatient gyn follow up. We will follow along peripherally. Please reach out if further questions arise.       Tricia Ma MD   OBGYN  PGY-4

## 2022-02-24 PROBLEM — F33.41 RECURRENT MAJOR DEPRESSIVE DISORDER, IN PARTIAL REMISSION: Status: ACTIVE | Noted: 2022-02-24

## 2022-02-24 PROBLEM — N17.9 AKI (ACUTE KIDNEY INJURY): Status: RESOLVED | Noted: 2022-02-18 | Resolved: 2022-02-24

## 2022-02-24 PROBLEM — R57.8 HEMORRHAGIC SHOCK: Status: RESOLVED | Noted: 2022-02-18 | Resolved: 2022-02-24

## 2022-02-24 PROBLEM — E78.00 PURE HYPERCHOLESTEROLEMIA: Status: ACTIVE | Noted: 2021-06-16

## 2022-02-24 PROBLEM — K21.9 GASTROESOPHAGEAL REFLUX DISEASE WITHOUT ESOPHAGITIS: Status: ACTIVE | Noted: 2022-02-24

## 2022-02-24 PROBLEM — D69.6 THROMBOCYTOPENIA: Status: ACTIVE | Noted: 2022-02-24

## 2022-02-24 LAB
ALBUMIN SERPL BCP-MCNC: 2.7 G/DL (ref 3.5–5.2)
ALP SERPL-CCNC: 147 U/L (ref 55–135)
ALT SERPL W/O P-5'-P-CCNC: 14 U/L (ref 10–44)
ANION GAP SERPL CALC-SCNC: 12 MMOL/L (ref 8–16)
ANISOCYTOSIS BLD QL SMEAR: SLIGHT
AST SERPL-CCNC: 60 U/L (ref 10–40)
BASOPHILS # BLD AUTO: 0.05 K/UL (ref 0–0.2)
BASOPHILS NFR BLD: 0.6 % (ref 0–1.9)
BILIRUB SERPL-MCNC: 1.1 MG/DL (ref 0.1–1)
BUN SERPL-MCNC: 9 MG/DL (ref 6–20)
CALCIUM SERPL-MCNC: 8.8 MG/DL (ref 8.7–10.5)
CHLORIDE SERPL-SCNC: 105 MMOL/L (ref 95–110)
CO2 SERPL-SCNC: 21 MMOL/L (ref 23–29)
CREAT SERPL-MCNC: 0.8 MG/DL (ref 0.5–1.4)
DIFFERENTIAL METHOD: ABNORMAL
EOSINOPHIL # BLD AUTO: 0.3 K/UL (ref 0–0.5)
EOSINOPHIL NFR BLD: 3.5 % (ref 0–8)
ERYTHROCYTE [DISTWIDTH] IN BLOOD BY AUTOMATED COUNT: 16.4 % (ref 11.5–14.5)
EST. GFR  (AFRICAN AMERICAN): >60 ML/MIN/1.73 M^2
EST. GFR  (NON AFRICAN AMERICAN): >60 ML/MIN/1.73 M^2
GLUCOSE SERPL-MCNC: 108 MG/DL (ref 70–110)
HCT VFR BLD AUTO: 28.2 % (ref 37–48.5)
HGB BLD-MCNC: 9.2 G/DL (ref 12–16)
HYPOCHROMIA BLD QL SMEAR: ABNORMAL
IMM GRANULOCYTES # BLD AUTO: 0.13 K/UL (ref 0–0.04)
IMM GRANULOCYTES NFR BLD AUTO: 1.6 % (ref 0–0.5)
LYMPHOCYTES # BLD AUTO: 1.3 K/UL (ref 1–4.8)
LYMPHOCYTES NFR BLD: 16.4 % (ref 18–48)
MAGNESIUM SERPL-MCNC: 1.7 MG/DL (ref 1.6–2.6)
MAGNESIUM SERPL-MCNC: 1.9 MG/DL (ref 1.6–2.6)
MCH RBC QN AUTO: 31.2 PG (ref 27–31)
MCHC RBC AUTO-ENTMCNC: 32.6 G/DL (ref 32–36)
MCV RBC AUTO: 96 FL (ref 82–98)
MONOCYTES # BLD AUTO: 0.5 K/UL (ref 0.3–1)
MONOCYTES NFR BLD: 6.6 % (ref 4–15)
NEUTROPHILS # BLD AUTO: 5.7 K/UL (ref 1.8–7.7)
NEUTROPHILS NFR BLD: 71.3 % (ref 38–73)
NRBC BLD-RTO: 1 /100 WBC
PHOSPHATE SERPL-MCNC: 2.2 MG/DL (ref 2.7–4.5)
PHOSPHATE SERPL-MCNC: 2.2 MG/DL (ref 2.7–4.5)
PLATELET # BLD AUTO: 145 K/UL (ref 150–450)
PLATELET BLD QL SMEAR: ABNORMAL
PMV BLD AUTO: 11.1 FL (ref 9.2–12.9)
POLYCHROMASIA BLD QL SMEAR: ABNORMAL
POTASSIUM SERPL-SCNC: 4.3 MMOL/L (ref 3.5–5.1)
PROT SERPL-MCNC: 6.1 G/DL (ref 6–8.4)
RBC # BLD AUTO: 2.95 M/UL (ref 4–5.4)
SODIUM SERPL-SCNC: 138 MMOL/L (ref 136–145)
WBC # BLD AUTO: 8.01 K/UL (ref 3.9–12.7)

## 2022-02-24 PROCEDURE — 25000003 PHARM REV CODE 250: Performed by: INTERNAL MEDICINE

## 2022-02-24 PROCEDURE — 25000003 PHARM REV CODE 250: Performed by: PHYSICIAN ASSISTANT

## 2022-02-24 PROCEDURE — 25000003 PHARM REV CODE 250: Performed by: STUDENT IN AN ORGANIZED HEALTH CARE EDUCATION/TRAINING PROGRAM

## 2022-02-24 PROCEDURE — 85025 COMPLETE CBC W/AUTO DIFF WBC: CPT | Performed by: INTERNAL MEDICINE

## 2022-02-24 PROCEDURE — 99233 PR SUBSEQUENT HOSPITAL CARE,LEVL III: ICD-10-PCS | Mod: ,,, | Performed by: STUDENT IN AN ORGANIZED HEALTH CARE EDUCATION/TRAINING PROGRAM

## 2022-02-24 PROCEDURE — 11000001 HC ACUTE MED/SURG PRIVATE ROOM

## 2022-02-24 PROCEDURE — 36415 COLL VENOUS BLD VENIPUNCTURE: CPT | Performed by: STUDENT IN AN ORGANIZED HEALTH CARE EDUCATION/TRAINING PROGRAM

## 2022-02-24 PROCEDURE — 84100 ASSAY OF PHOSPHORUS: CPT | Mod: 91 | Performed by: STUDENT IN AN ORGANIZED HEALTH CARE EDUCATION/TRAINING PROGRAM

## 2022-02-24 PROCEDURE — 80053 COMPREHEN METABOLIC PANEL: CPT | Performed by: STUDENT IN AN ORGANIZED HEALTH CARE EDUCATION/TRAINING PROGRAM

## 2022-02-24 PROCEDURE — 83735 ASSAY OF MAGNESIUM: CPT | Performed by: STUDENT IN AN ORGANIZED HEALTH CARE EDUCATION/TRAINING PROGRAM

## 2022-02-24 PROCEDURE — 94760 N-INVAS EAR/PLS OXIMETRY 1: CPT

## 2022-02-24 PROCEDURE — 99233 SBSQ HOSP IP/OBS HIGH 50: CPT | Mod: ,,, | Performed by: STUDENT IN AN ORGANIZED HEALTH CARE EDUCATION/TRAINING PROGRAM

## 2022-02-24 PROCEDURE — 36415 COLL VENOUS BLD VENIPUNCTURE: CPT | Performed by: GENERAL ACUTE CARE HOSPITAL

## 2022-02-24 RX ORDER — LISINOPRIL 20 MG/1
20 TABLET ORAL DAILY
Status: DISCONTINUED | OUTPATIENT
Start: 2022-02-24 | End: 2022-02-27 | Stop reason: HOSPADM

## 2022-02-24 RX ORDER — SODIUM,POTASSIUM PHOSPHATES 280-250MG
2 POWDER IN PACKET (EA) ORAL ONCE
Status: COMPLETED | OUTPATIENT
Start: 2022-02-24 | End: 2022-02-24

## 2022-02-24 RX ORDER — DIPHENHYDRAMINE HCL 25 MG
25 CAPSULE ORAL EVERY 6 HOURS PRN
Status: DISCONTINUED | OUTPATIENT
Start: 2022-02-24 | End: 2022-02-27 | Stop reason: HOSPADM

## 2022-02-24 RX ADMIN — DIPHENHYDRAMINE HYDROCHLORIDE 25 MG: 25 CAPSULE ORAL at 09:02

## 2022-02-24 RX ADMIN — METOPROLOL SUCCINATE 50 MG: 50 TABLET, EXTENDED RELEASE ORAL at 08:02

## 2022-02-24 RX ADMIN — MEDROXYPROGESTERONE ACETATE 20 MG: 10 TABLET ORAL at 09:02

## 2022-02-24 RX ADMIN — PROPAFENONE HYDROCHLORIDE 225 MG: 150 TABLET, FILM COATED ORAL at 06:02

## 2022-02-24 RX ADMIN — ATORVASTATIN CALCIUM 40 MG: 20 TABLET, FILM COATED ORAL at 08:02

## 2022-02-24 RX ADMIN — POTASSIUM & SODIUM PHOSPHATES POWDER PACK 280-160-250 MG 2 PACKET: 280-160-250 PACK at 02:02

## 2022-02-24 RX ADMIN — MEDROXYPROGESTERONE ACETATE 20 MG: 10 TABLET ORAL at 08:02

## 2022-02-24 RX ADMIN — TRAZODONE HYDROCHLORIDE 100 MG: 50 TABLET ORAL at 09:02

## 2022-02-24 RX ADMIN — LISINOPRIL 20 MG: 20 TABLET ORAL at 02:02

## 2022-02-24 RX ADMIN — MEDROXYPROGESTERONE ACETATE 20 MG: 10 TABLET ORAL at 02:02

## 2022-02-24 RX ADMIN — ACETAMINOPHEN 650 MG: 325 TABLET ORAL at 09:02

## 2022-02-24 RX ADMIN — DULOXETINE 120 MG: 60 CAPSULE, DELAYED RELEASE ORAL at 08:02

## 2022-02-24 RX ADMIN — DIPHENHYDRAMINE HYDROCHLORIDE 25 MG: 25 CAPSULE ORAL at 10:02

## 2022-02-24 RX ADMIN — PROPAFENONE HYDROCHLORIDE 225 MG: 150 TABLET, FILM COATED ORAL at 09:02

## 2022-02-24 RX ADMIN — ALPRAZOLAM 0.5 MG: 0.5 TABLET ORAL at 09:02

## 2022-02-24 RX ADMIN — PROPAFENONE HYDROCHLORIDE 225 MG: 150 TABLET, FILM COATED ORAL at 02:02

## 2022-02-24 RX ADMIN — PANTOPRAZOLE SODIUM 40 MG: 40 TABLET, DELAYED RELEASE ORAL at 08:02

## 2022-02-24 NOTE — PROGRESS NOTES
[facility] [place] Medicine  Progress Note    Patient Name: Vicky Alvarado  MRN: [id number]  Patient Class: IP- Inpatient   Admission Date: 2/18/2022  Length of Stay: 6 days  Attending Physician: Zachery Bhatia MD  Primary Care Provider: Gordy Cordero MD        Subjective:     Principal Problem:Vagina bleeding        HPI:  Vicky Alvarado is a 56 year old female with HTN and history of atrial fibrillation s/p ablation 2 weeks ago (still on eliquis) who [place] [facility] [place] 2/18/22 complaining of vaginal bleeding for past 2 days. Patient reports she started noticed significant vaginal bleeding and pass clots on 2/16. Described some clots as the size of oranges. Reports associated abdominal cramping with vaginal bleeding. She is having to wear two large menstrual pads and changing them frequently. Standing up causes her to bleed more. Patient mentions that she has not menstruated in the last 5 years. Other associated symptoms included fatigue, dizziness, lightheadedness and NAPIER. Denies fever, chills, cough, chest pain, or sick contacts. Reports taking her last dose of eliquis on evening of 2/17 and took her antihypertensive medications this morning.      In the ED, patient found to be hypotensive with active vaginal hemorrhage. Hemoglobin at that time was 10.7.1 unit of emergency blood given. Gynecology consulted and recommending IV estrogen. Critical care medicine consulted for hemorrhagic shock.       Overview/Hospital Course:  Patient was admitted to ICU 2/18 for hemorrhagic shock due to vaginal bleeding was transfused 1 unit PRBCs.  Also started on low-dose norepinephrine infusion.  Gynecology consulted and started estrogen. Low doses of norepinephrine infusion weaned off.  Continued to have vaginal bleeding. Gync consulted s/p D&C for vaginal bleeding. She was given PRN blood products. Gyn started oral provera treatment in the interim to keep bleeding stable; 20 mg TID. No signs of post  procedure endometritis, no need for abx. Stepped down to  2/22. Post step down her Hgb drooped to 5.8 , HDS, given 2 units pRBC. Gyn following. No other source of bleed identified at this time.     Transferred to  on 1/23, transfused an additional 2u pRBCs with good recovery. GYN evaluated pt, and she was started on provera with good effect and dramatic improvement/near resolution in vaginal bleeding into 1/24.      Interval History: Pt seen and examined this morning on jamaica WONG. Vaginal bleeding has mostly resolved since starting provera: states she has minimal spotting on pad and when wiping; no active flow. Overall much improved. Care plan reviewed. Otherwise, doing well and with no further complaints at this time.        Objective:     Vital Signs (Most Recent):  Temp: 96.3 °F (35.7 °C) (02/24/22 1054)  Pulse: 67 (02/24/22 1116)  Resp: 16 (02/24/22 0803)  BP: (!) 141/73 (02/24/22 1054)  SpO2: 95 % (02/24/22 1054)   Vital Signs (24h Range):  Temp:  [96.3 °F (35.7 °C)-98 °F (36.7 °C)] 96.3 °F (35.7 °C)  Pulse:  [53-73] 67  Resp:  [16-18] 16  SpO2:  [92 %-97 %] 95 %  BP: (113-141)/(55-73) 141/73     Weight: (!) 139.3 kg (307 lb)  Body mass index is 49.55 kg/m².    Intake/Output Summary (Last 24 hours) at 2/24/2022 1246  Last data filed at 2/23/2022 1845  Gross per 24 hour   Intake 661.25 ml   Output --   Net 661.25 ml        Physical Exam  Gen: in NAD, appears stated age; pt is morbidly obese  Neuro: AAOx4, CN2-12 grossly intact BL; motor, sensory, and strength grossly intact BL  HEENT: NTNC, EOMI, PERRLA, MMM; no thyromegaly or lymphadenopathy; no JVD appreciated  CVS: RRR, no m/r/g; S1/S2 auscultated with no S3 or S4; capillary refill < 2 sec  Resp: lungs CTAB, no w/r/r; no belabored breathing or accessory muscle use appreciated   Abd: BS+ in all 4 quadrants; NTND, soft to palpation; no organomegaly appreciated   : deferred per pt  Extrem: pulses full, equal, and regular over all 4 extremities; trace  nonpitting BL LE edema      Significant Labs: All pertinent labs within the past 24 hours have been reviewed.    Significant Imaging: I have reviewed all pertinent imaging results/findings within the past 24 hours.      Assessment/Plan:      * Vagina bleeding  - Interval history and physical exam findings as described above  - MICU course and blood transfusion history as documented  - Evaluated by GYN, on provera  - Marked improvement in vaginal bleeding, mostly resolved  - H/H stable  - Continuing to hold eliquis  - Continuing to trend H/H, will transfuse to maintain Hb>7  - Will continue to monitor    Atrial Fibrillation (paroxysmal)  - S/p 2 cardioversion.  She is now in sinus, but is concerned that she may need her anti-arrhythmics resumed soon.  - continue propafenone  - resume metoprolol.  Increase to home dose as tolerated.  - holding anticoagulation at this time with anemia requiring transfusion, resume when able  - Will continue to monitor on tele    Essential hypertension  - Working to optimize BP control   - Continue home regimen of metoprolol and lisinopril  - Will continue to monitor and further titrate antihypertensives as clinically indicated     Pure hypercholesterolemia  - Continue home statin regimen    Gastroesophageal reflux disease without esophagitis  - Currently asymptomatic  - Continue home PPI regimen    Recurrent major depressive disorder, in partial remission  - Currently asymptomatic  - Continue home SNRI regimen    Thrombocytopenia  - Mild, no longer with aggressive bleeding  - Will continue to monitor plts on AM labs    Morbid obesity with BMI of 45.0-49.9, adult  - Body mass index is 49.55 kg/m².  - Pt counseled on dietary and lifestyle modifications in relation to health  - Consider dietary/nutrition consult outpatient      VTE Risk Mitigation (From admission, onward)         Ordered     IP VTE HIGH RISK PATIENT  Once         02/19/22 1655     Place sequential compression device   Until discontinued         02/19/22 1655     Place AL hose  Until discontinued         02/19/22 1655                Discharge Planning   RAYO: 2/27/2022     Code Status: Full Code   Is the patient medically ready for discharge?: No    Reason for patient still in hospital (select all that apply): Patient trending condition  Discharge Plan A: Home Health          Zachery Bhatia MD  Attending Physician  Department of Hospital Medicine  Epic secure chat preferred, or SpectraLink ext. [phone]  2/24/2022

## 2022-02-24 NOTE — ASSESSMENT & PLAN NOTE
- Interval history and physical exam findings as described above  - MICU course and blood transfusion history as documented  - Evaluated by GYN, on provera  - Marked improvement in vaginal bleeding, mostly resolved  - H/H stable  - Continuing to hold eliquis  - Continuing to trend H/H, will transfuse to maintain Hb>7  - Will continue to monitor

## 2022-02-24 NOTE — PLAN OF CARE
Problem: Adult Inpatient Plan of Care  Goal: Plan of Care Review  Outcome: Ongoing, Progressing  Goal: Patient-Specific Goal (Individualized)  Outcome: Ongoing, Progressing  Goal: Optimal Comfort and Wellbeing  Outcome: Ongoing, Progressing     Problem: Skin Injury Risk Increased  Goal: Skin Health and Integrity  Outcome: Ongoing, Progressing     Problem: Fall Injury Risk  Goal: Absence of Fall and Fall-Related Injury  Outcome: Ongoing, Progressing

## 2022-02-24 NOTE — PLAN OF CARE
Problem: Adult Inpatient Plan of Care  Goal: Plan of Care Review  Outcome: Ongoing, Progressing  Goal: Patient-Specific Goal (Individualized)  Outcome: Ongoing, Progressing  Goal: Absence of Hospital-Acquired Illness or Injury  Outcome: Ongoing, Progressing  Goal: Optimal Comfort and Wellbeing  Outcome: Ongoing, Progressing  Goal: Readiness for Transition of Care  Outcome: Ongoing, Progressing     AAOx4. VS stable. No falls or injuries. Bed low, wheels locked, side rails up x2, call light within reach. Will continue to monitor.

## 2022-02-24 NOTE — ASSESSMENT & PLAN NOTE
- Body mass index is 49.55 kg/m².  - Pt counseled on dietary and lifestyle modifications in relation to health  - Consider dietary/nutrition consult outpatient

## 2022-02-24 NOTE — SUBJECTIVE & OBJECTIVE
Interval History: Pt seen and examined this morning on jamaica WONG. Vaginal bleeding has mostly resolved since starting provera: states she has minimal spotting on pad and when wiping; no active flow. Overall much improved. Care plan reviewed. Otherwise, doing well and with no further complaints at this time.        Objective:     Vital Signs (Most Recent):  Temp: 96.3 °F (35.7 °C) (02/24/22 1054)  Pulse: 67 (02/24/22 1116)  Resp: 16 (02/24/22 0803)  BP: (!) 141/73 (02/24/22 1054)  SpO2: 95 % (02/24/22 1054)   Vital Signs (24h Range):  Temp:  [96.3 °F (35.7 °C)-98 °F (36.7 °C)] 96.3 °F (35.7 °C)  Pulse:  [53-73] 67  Resp:  [16-18] 16  SpO2:  [92 %-97 %] 95 %  BP: (113-141)/(55-73) 141/73     Weight: (!) 139.3 kg (307 lb)  Body mass index is 49.55 kg/m².    Intake/Output Summary (Last 24 hours) at 2/24/2022 1246  Last data filed at 2/23/2022 1845  Gross per 24 hour   Intake 661.25 ml   Output --   Net 661.25 ml        Physical Exam  Gen: in NAD, appears stated age; pt is morbidly obese  Neuro: AAOx4, CN2-12 grossly intact BL; motor, sensory, and strength grossly intact BL  HEENT: NTNC, EOMI, PERRLA, MMM; no thyromegaly or lymphadenopathy; no JVD appreciated  CVS: RRR, no m/r/g; S1/S2 auscultated with no S3 or S4; capillary refill < 2 sec  Resp: lungs CTAB, no w/r/r; no belabored breathing or accessory muscle use appreciated   Abd: BS+ in all 4 quadrants; NTND, soft to palpation; no organomegaly appreciated   : deferred per pt  Extrem: pulses full, equal, and regular over all 4 extremities; trace nonpitting BL LE edema      Significant Labs: All pertinent labs within the past 24 hours have been reviewed.    Significant Imaging: I have reviewed all pertinent imaging results/findings within the past 24 hours.

## 2022-02-24 NOTE — ASSESSMENT & PLAN NOTE
- S/p 2 cardioversion.  She is now in sinus, but is concerned that she may need her anti-arrhythmics resumed soon.  - continue propafenone  - resume metoprolol.  Increase to home dose as tolerated.  - holding anticoagulation at this time with anemia requiring transfusion, resume when able  - Will continue to monitor on tele

## 2022-02-24 NOTE — ASSESSMENT & PLAN NOTE
- Working to optimize BP control   - Continue home regimen of metoprolol and lisinopril  - Will continue to monitor and further titrate antihypertensives as clinically indicated

## 2022-02-25 PROBLEM — D62 ACUTE POST-HEMORRHAGIC ANEMIA: Status: ACTIVE | Noted: 2022-02-25

## 2022-02-25 LAB
ALBUMIN SERPL BCP-MCNC: 2.7 G/DL (ref 3.5–5.2)
ALP SERPL-CCNC: 146 U/L (ref 55–135)
ALT SERPL W/O P-5'-P-CCNC: 15 U/L (ref 10–44)
ANION GAP SERPL CALC-SCNC: 9 MMOL/L (ref 8–16)
AST SERPL-CCNC: 55 U/L (ref 10–40)
BASOPHILS # BLD AUTO: 0.05 K/UL (ref 0–0.2)
BASOPHILS NFR BLD: 0.8 % (ref 0–1.9)
BILIRUB SERPL-MCNC: 0.5 MG/DL (ref 0.1–1)
BUN SERPL-MCNC: 11 MG/DL (ref 6–20)
CALCIUM SERPL-MCNC: 9.2 MG/DL (ref 8.7–10.5)
CHLORIDE SERPL-SCNC: 105 MMOL/L (ref 95–110)
CO2 SERPL-SCNC: 24 MMOL/L (ref 23–29)
CREAT SERPL-MCNC: 0.8 MG/DL (ref 0.5–1.4)
DIFFERENTIAL METHOD: ABNORMAL
EOSINOPHIL # BLD AUTO: 0.3 K/UL (ref 0–0.5)
EOSINOPHIL NFR BLD: 3.8 % (ref 0–8)
ERYTHROCYTE [DISTWIDTH] IN BLOOD BY AUTOMATED COUNT: 16.6 % (ref 11.5–14.5)
EST. GFR  (AFRICAN AMERICAN): >60 ML/MIN/1.73 M^2
EST. GFR  (NON AFRICAN AMERICAN): >60 ML/MIN/1.73 M^2
GLUCOSE SERPL-MCNC: 105 MG/DL (ref 70–110)
HCT VFR BLD AUTO: 30.9 % (ref 37–48.5)
HGB BLD-MCNC: 9.6 G/DL (ref 12–16)
IMM GRANULOCYTES # BLD AUTO: 0.09 K/UL (ref 0–0.04)
IMM GRANULOCYTES NFR BLD AUTO: 1.4 % (ref 0–0.5)
LYMPHOCYTES # BLD AUTO: 1.4 K/UL (ref 1–4.8)
LYMPHOCYTES NFR BLD: 21.1 % (ref 18–48)
MAGNESIUM SERPL-MCNC: 1.7 MG/DL (ref 1.6–2.6)
MCH RBC QN AUTO: 31.4 PG (ref 27–31)
MCHC RBC AUTO-ENTMCNC: 31.1 G/DL (ref 32–36)
MCV RBC AUTO: 101 FL (ref 82–98)
MONOCYTES # BLD AUTO: 0.6 K/UL (ref 0.3–1)
MONOCYTES NFR BLD: 9.2 % (ref 4–15)
NEUTROPHILS # BLD AUTO: 4.2 K/UL (ref 1.8–7.7)
NEUTROPHILS NFR BLD: 63.7 % (ref 38–73)
NRBC BLD-RTO: 1 /100 WBC
PHOSPHATE SERPL-MCNC: 2.8 MG/DL (ref 2.7–4.5)
PLATELET # BLD AUTO: 151 K/UL (ref 150–450)
PMV BLD AUTO: 10.3 FL (ref 9.2–12.9)
POTASSIUM SERPL-SCNC: 4.2 MMOL/L (ref 3.5–5.1)
PROT SERPL-MCNC: 6.2 G/DL (ref 6–8.4)
RBC # BLD AUTO: 3.06 M/UL (ref 4–5.4)
SODIUM SERPL-SCNC: 138 MMOL/L (ref 136–145)
WBC # BLD AUTO: 6.6 K/UL (ref 3.9–12.7)

## 2022-02-25 PROCEDURE — 84100 ASSAY OF PHOSPHORUS: CPT | Performed by: INTERNAL MEDICINE

## 2022-02-25 PROCEDURE — 99233 SBSQ HOSP IP/OBS HIGH 50: CPT | Mod: ,,, | Performed by: INTERNAL MEDICINE

## 2022-02-25 PROCEDURE — 99232 SBSQ HOSP IP/OBS MODERATE 35: CPT | Mod: GT,,, | Performed by: INTERNAL MEDICINE

## 2022-02-25 PROCEDURE — 80053 COMPREHEN METABOLIC PANEL: CPT | Performed by: STUDENT IN AN ORGANIZED HEALTH CARE EDUCATION/TRAINING PROGRAM

## 2022-02-25 PROCEDURE — 25000003 PHARM REV CODE 250: Performed by: INTERNAL MEDICINE

## 2022-02-25 PROCEDURE — 85025 COMPLETE CBC W/AUTO DIFF WBC: CPT | Performed by: INTERNAL MEDICINE

## 2022-02-25 PROCEDURE — 11000001 HC ACUTE MED/SURG PRIVATE ROOM

## 2022-02-25 PROCEDURE — 25000003 PHARM REV CODE 250: Performed by: STUDENT IN AN ORGANIZED HEALTH CARE EDUCATION/TRAINING PROGRAM

## 2022-02-25 PROCEDURE — 25000003 PHARM REV CODE 250: Performed by: CLINICAL NURSE SPECIALIST

## 2022-02-25 PROCEDURE — 99233 PR SUBSEQUENT HOSPITAL CARE,LEVL III: ICD-10-PCS | Mod: ,,, | Performed by: INTERNAL MEDICINE

## 2022-02-25 PROCEDURE — 94760 N-INVAS EAR/PLS OXIMETRY 1: CPT

## 2022-02-25 PROCEDURE — 83735 ASSAY OF MAGNESIUM: CPT | Performed by: INTERNAL MEDICINE

## 2022-02-25 PROCEDURE — 99232 PR SUBSEQUENT HOSPITAL CARE,LEVL II: ICD-10-PCS | Mod: GT,,, | Performed by: INTERNAL MEDICINE

## 2022-02-25 PROCEDURE — 36415 COLL VENOUS BLD VENIPUNCTURE: CPT | Performed by: INTERNAL MEDICINE

## 2022-02-25 PROCEDURE — 25000003 PHARM REV CODE 250: Performed by: PHYSICIAN ASSISTANT

## 2022-02-25 RX ADMIN — PROPAFENONE HYDROCHLORIDE 225 MG: 150 TABLET, FILM COATED ORAL at 10:02

## 2022-02-25 RX ADMIN — PROPAFENONE HYDROCHLORIDE 225 MG: 150 TABLET, FILM COATED ORAL at 02:02

## 2022-02-25 RX ADMIN — ALPRAZOLAM 0.5 MG: 0.5 TABLET ORAL at 10:02

## 2022-02-25 RX ADMIN — ACETAMINOPHEN 650 MG: 325 TABLET ORAL at 09:02

## 2022-02-25 RX ADMIN — ATORVASTATIN CALCIUM 40 MG: 20 TABLET, FILM COATED ORAL at 10:02

## 2022-02-25 RX ADMIN — METOPROLOL SUCCINATE 50 MG: 50 TABLET, EXTENDED RELEASE ORAL at 10:02

## 2022-02-25 RX ADMIN — MEDROXYPROGESTERONE ACETATE 20 MG: 10 TABLET ORAL at 09:02

## 2022-02-25 RX ADMIN — ACETAMINOPHEN 650 MG: 325 TABLET ORAL at 06:02

## 2022-02-25 RX ADMIN — DULOXETINE 120 MG: 60 CAPSULE, DELAYED RELEASE ORAL at 10:02

## 2022-02-25 RX ADMIN — ALPRAZOLAM 0.5 MG: 0.5 TABLET ORAL at 09:02

## 2022-02-25 RX ADMIN — MEDROXYPROGESTERONE ACETATE 20 MG: 10 TABLET ORAL at 10:02

## 2022-02-25 RX ADMIN — LISINOPRIL 20 MG: 20 TABLET ORAL at 10:02

## 2022-02-25 RX ADMIN — POLYETHYLENE GLYCOL 3350 17 G: 17 POWDER, FOR SOLUTION ORAL at 10:02

## 2022-02-25 RX ADMIN — TRAZODONE HYDROCHLORIDE 100 MG: 50 TABLET ORAL at 09:02

## 2022-02-25 RX ADMIN — PANTOPRAZOLE SODIUM 40 MG: 40 TABLET, DELAYED RELEASE ORAL at 10:02

## 2022-02-25 RX ADMIN — DIPHENHYDRAMINE HYDROCHLORIDE 25 MG: 25 CAPSULE ORAL at 09:02

## 2022-02-25 RX ADMIN — MEDROXYPROGESTERONE ACETATE 20 MG: 10 TABLET ORAL at 02:02

## 2022-02-25 RX ADMIN — PROPAFENONE HYDROCHLORIDE 225 MG: 150 TABLET, FILM COATED ORAL at 05:02

## 2022-02-25 RX ADMIN — APIXABAN 2.5 MG: 2.5 TABLET, FILM COATED ORAL at 09:02

## 2022-02-25 NOTE — ASSESSMENT & PLAN NOTE
Previously with microcytic anemia but now with elevated MCV  -repeat ferritin and also B12, folate  -transfuse for acute bleeding or hemoglobin less than 7

## 2022-02-25 NOTE — PLAN OF CARE
[facility]  Discharge Reassessment    Primary Care Provider: Gordy Cordero MD    Expected Discharge Date: 2/27/2022    Reassessment (most recent)     Discharge Reassessment - 02/25/22 1429        Discharge Reassessment    Assessment Type Discharge Planning Reassessment     Did the patient's condition or plan change since previous assessment? No     Discharge Plan discussed with: Patient     Discharge Plan A Home     Discharge Plan B Home     DME Needed Upon Discharge  none     Discharge Barriers Identified None     Why the patient remains in the hospital Requires continued medical care        Post-Acute Status    Post-Acute Authorization Other     Other Status No Post-Acute Service Needs               Liat Rodriguez RN  Ext [phone]

## 2022-02-25 NOTE — SUBJECTIVE & OBJECTIVE
Past Medical History:   Diagnosis Date    Anticoagulant long-term use     Arthritis     Atrial fibrillation     cardioversion    Atrial fibrillation with RVR     Avascular necrosis     L hand    CHF (congestive heart failure)     Depression     Encounter for blood transfusion 07/22/2020    GERD (gastroesophageal reflux disease)     Hx of psychiatric care     Hypertension     Obese     Psychiatric problem     Sleep apnea        Past Surgical History:   Procedure Laterality Date    ABLATION OF ARRHYTHMOGENIC FOCUS FOR ATRIAL FIBRILLATION N/A 7/20/2020    Procedure: Ablation atrial fibrillation;  Surgeon: Gabriel Hawley MD;  Location: [facility] EP LAB;  Service: Cardiology;  Laterality: N/A;  afib, PVI, RFA, REENA, SHADY, anes, MB, 3 Prep    ABLATION OF ARRHYTHMOGENIC FOCUS FOR ATRIAL FIBRILLATION N/A 1/24/2022    Procedure: Ablation atrial fibrillation;  Surgeon: Gabriel Hawley MD;  Location: [facility] EP LAB;  Service: Cardiology;  Laterality: N/A;  afib/afl, PVI (re-do)/CTI, RFA, REENA (cx if SR), SHADY, anes, MB, 3 Prep    APPLICATION OF WOUND VACUUM-ASSISTED CLOSURE DEVICE Left 8/3/2020    Procedure: APPLICATION, WOUND VAC;  Surgeon: SEMAJ Márquez III, MD;  Location: [facility] OR 2ND FLR;  Service: Peripheral Vascular;  Laterality: Left;    APPLICATION OF WOUND VACUUM-ASSISTED CLOSURE DEVICE Left 8/6/2020    Procedure: APPLICATION, WOUND VAC;  Surgeon: SEMAJ Márquez III, MD;  Location: [facility] OR 2ND FLR;  Service: Peripheral Vascular;  Laterality: Left;    CARPAL TUNNEL RELEASE Right 6/10/2020    Procedure: RELEASE, CARPAL TUNNEL - RIGHT;  Surgeon: Adelaida Hall MD;  Location: [facility] OR;  Service: Orthopedics;  Laterality: Right;  GENERAL AND REGIONAL    CARPAL TUNNEL RELEASE Left 5/5/2021    Procedure: RELEASE, CARPAL TUNNEL, LEFT;  Surgeon: Adelaida Hall MD;  Location: [facility] OR;  Service: Orthopedics;  Laterality: Left;  GENERAL & REGIONAL IN PACU    CLOSURE OF WOUND Left 8/6/2020    Procedure: CLOSURE, WOUND;   Surgeon: SEMAJ Márquez III, MD;  Location: [facility] OR [address] [place] [address];  Service: Peripheral Vascular;  Laterality: Left;  Complex    COLONOSCOPY N/A 8/17/2021    Procedure: COLONOSCOPY Suprep;  Surgeon: Loco Deluca MD;  Location: [facility];  Service: Endoscopy;  Laterality: N/A;    ESOPHAGOGASTRODUODENOSCOPY N/A 8/17/2021    Procedure: EGD (ESOPHAGOGASTRODUODENOSCOPY);  Surgeon: Loco Deluca MD;  Location: [facility];  Service: Endoscopy;  Laterality: N/A;  Patient is schedule to have her Covid test on 08/14/2021 at the [facility] @ 9:30am. [place].    EXPLORATION OF FEMORAL ARTERY Left 7/21/2020    Procedure: EXPLORATION, ARTERY, FEMORAL;  Surgeon: SEMAJ Márquez III, MD;  Location: [facility] OR [address] [place] [address];  Service: Peripheral Vascular;  Laterality: Left;  1. Urgent Direct repair, L SFA branch laceration    FOOT SURGERY      HYSTEROSCOPY WITH DILATION AND CURETTAGE OF UTERUS N/A 2/19/2022    Procedure: HYSTEROSCOPY, WITH DILATION AND CURETTAGE OF UTERUS;  Surgeon: Shane Palomo MD;  [facility];  Service: OB/GYN;  Laterality: N/A;    TREATMENT OF CARDIAC ARRHYTHMIA N/A 1/29/2020    Procedure: CARDIOVERSION;  Surgeon: Gabriel Hawley MD;  Location: [facility] EP LAB;  Service: Cardiology;  Laterality: N/A;  af, demi, dccv, anes, mb, 345    TREATMENT OF CARDIAC ARRHYTHMIA  1/24/2022    Procedure: Cardioversion or Defibrillation;  Surgeon: Gabriel Hawley MD;  Location: [facility] EP LAB;  Service: Cardiology;;    WOUND DEBRIDEMENT Left 8/6/2020    Procedure: DEBRIDEMENT, WOUND;  Surgeon: SEMAJ Márquez III, MD;  [facility];  Service: Peripheral Vascular;  Laterality: Left;       Review of patient's allergies indicates:   Allergen Reactions    Flecainide Shortness Of Breath and Swelling       Current Facility-Administered Medications on File Prior to Encounter   Medication    sodium chloride 0.9% bolus 1,000 mL     Current Outpatient Medications on File Prior to Encounter    Medication Sig    acetaminophen (TYLENOL) 500 MG tablet Take 2 tablets (1,000 mg) by mouth at onset of pain or headache, may repeat if needed.    ALPRAZolam (XANAX) 0.5 MG tablet Take 1 tablet (0.5 mg total) by mouth 2 (two) times daily as needed for Anxiety.    apixaban (ELIQUIS) 5 mg Tab Take 1 tablet (5 mg total) by mouth 2 (two) times daily.    atorvastatin (LIPITOR) 40 MG tablet Take 1 tablet (40 mg total) by mouth once daily.    b complex vitamins capsule Take 1 capsule by mouth once daily.    colchicine (COLCRYS) 0.6 mg tablet For gout attack: Take TWO tablets by mouth, then, 2 hours later, take ONE additional tablet. Then take 1 tablet twice daily only if still needed for gout pain. (Patient taking differently: For gout attack: Take TWO tablets by mouth, then, 2 hours later, take ONE additional tablet. Then take 1 tablet twice daily only if still needed for gout pain.)    DULoxetine (CYMBALTA) 60 MG capsule Take 2 capsules (120 mg total) by mouth once daily.    ferrous sulfate (SLOW RELEASE IRON) 142 mg (45 mg iron) TbSR Take 1 tablet by mouth once daily.    furosemide (LASIX) 40 MG tablet Take 1 tablet (40 mg total) by mouth 2 (two) times daily.    [facility]-Y-btrs-reymundo-bor 750 mg-644 mg- 30 mg-1 mg Tab Take 1 tablet by mouth once daily.     krill/om-3/dha/epa/phospho/ast (MEGARED OMEGA-3 KRILL OIL ORAL) Take 1 capsule by mouth once daily.    lisinopriL (PRINIVIL,ZESTRIL) 40 MG tablet Take 1 tablet (40 mg total) by mouth once daily.    melatonin 10 mg Tab Take 1-2 tablets by mouth nightly as needed (Sleep).    metoprolol succinate (TOPROL-XL) 50 MG 24 hr tablet Take 1 tablet (50 mg total) by mouth 2 (two) times daily.    multivitamin (THERAGRAN) per tablet Take 1 tablet by mouth once daily.    NIFEdipine (PROCARDIA-XL) 90 MG (OSM) 24 hr tablet Take 1 tablet (90 mg total) by mouth once daily.    omeprazole (PRILOSEC) 20 MG capsule TAKE 1 CAPSULE BY MOUTH ONCE DAILY    polyethylene glycol (MIRALAX) 17  gram PwPk Take by mouth as needed (constipation).    predniSONE (DELTASONE) 10 MG tablet Take 1 tablet by mouth daily (Patient taking differently: Take by mouth daily as needed (alternates with Colchicine).)    propafenone (RYTHMOL) 225 MG Tab Take 1 tablet (225 mg total) by mouth every 8 (eight) hours.    traZODone (DESYREL) 100 MG tablet Take 1 tablet (100 mg total) by mouth nightly as needed for Insomnia.    UNABLE TO FIND Take 1 tablet by mouth once daily. Trino's Leg Cramps    pantoprazole (PROTONIX) 40 MG tablet Take 1 tablet (40 mg total) by mouth once daily. (Patient not taking: Reported on 2022)    potassium chloride SA (K-DUR,KLOR-CON) 20 MEQ tablet Take 1 tablet (20 mEq total) by mouth once daily.     Family History       Problem Relation (Age of Onset)    Cataracts Mother    Diabetes Father    Hypertension Mother, Father, Sister, Brother    Thyroid disease Mother          Tobacco Use    Smoking status: Former Smoker     Types: Cigarettes     Quit date: 2019     Years since quittin.6    Smokeless tobacco: Never Used   Substance and Sexual Activity    Alcohol use: No    Drug use: No    Sexual activity: Not on file     Review of Systems   Constitutional: Negative.   HENT: Negative.     Eyes: Negative.    Cardiovascular: Negative.    Respiratory: Negative.     Endocrine: Negative.    Hematologic/Lymphatic: Negative.    Skin: Negative.    Musculoskeletal: Negative.    Gastrointestinal: Negative.    Genitourinary: Negative.    Neurological: Negative.    Psychiatric/Behavioral: Negative.     Allergic/Immunologic: Negative.    Objective:     Vital Signs (Most Recent):  Temp: 97.4 °F (36.3 °C) (22 1552)  Pulse: 63 (22 1552)  Resp: 18 (22 0533)  BP: (!) 111/55 (22 1552)  SpO2: 99 % (22 1552)   Vital Signs (24h Range):  Temp:  [97.3 °F (36.3 °C)-97.8 °F (36.6 °C)] 97.4 °F (36.3 °C)  Pulse:  [57-81] 63  Resp:  [18-20] 18  SpO2:  [96 %-100 %] 99 %  BP: ()/(46-67)  111/55       Weight: (!) 139.3 kg (307 lb)  Body mass index is 49.55 kg/m².    SpO2: 99 %  O2 Device (Oxygen Therapy): room air    Physical Exam  Constitutional:       General: She is not in acute distress.     Appearance: She is obese. She is not ill-appearing.      Comments: Pleasant middle age female   HENT:      Head: Normocephalic.      Mouth/Throat:      Mouth: Mucous membranes are moist.   Eyes:      Extraocular Movements: Extraocular movements intact.   Cardiovascular:      Rate and Rhythm: Normal rate and regular rhythm.      Pulses: Normal pulses.      Heart sounds: No murmur heard.  Pulmonary:      Effort: Pulmonary effort is normal. No respiratory distress.   Abdominal:      General: Bowel sounds are normal. There is no distension.      Palpations: Abdomen is soft.      Tenderness: There is no abdominal tenderness.   Musculoskeletal:      Cervical back: Neck supple.      Right lower leg: No edema.      Left lower leg: No edema.   Skin:     General: Skin is warm.   Neurological:      General: No focal deficit present.      Mental Status: She is alert.       Significant Labs: BMP:   Recent Labs   Lab 02/24/22  0613 02/24/22  0829 02/25/22  0517     --  105     --  138   K 4.3  --  4.2     --  105   CO2 21*  --  24   BUN 9  --  11   CREATININE 0.8  --  0.8   CALCIUM 8.8  --  9.2   MG 1.9 1.7 1.7   , CMP:   Recent Labs   Lab 02/24/22  0613 02/25/22  0517    138   K 4.3 4.2    105   CO2 21* 24    105   BUN 9 11   CREATININE 0.8 0.8   CALCIUM 8.8 9.2   PROT 6.1 6.2   ALBUMIN 2.7* 2.7*   BILITOT 1.1* 0.5   ALKPHOS 147* 146*   AST 60* 55*   ALT 14 15   ANIONGAP 12 9   ESTGFRAFRICA >60.0 >60.0   EGFRNONAA >60.0 >60.0   , CBC:   Recent Labs   Lab 02/23/22  2036 02/24/22  0305 02/25/22  0517   WBC 10.60 8.01 6.60   HGB 10.1* 9.2* 9.6*   HCT 30.4* 28.2* 30.9*   * 145* 151   , INR: No results for input(s): INR, PROTIME in the last 48 hours., Lipid Panel No results for  input(s): CHOL, HDL, LDLCALC, TRIG, CHOLHDL in the last 48 hours., and Troponin No results for input(s): TROPONINI in the last 48 hours.    Significant Imaging:   TTE 6/30/2021  The left ventricle is normal in size with concentric remodeling and normal systolic function. The estimated ejection fraction is 60%.  Normal left ventricular diastolic function.  Normal right ventricular size with normal right ventricular systolic function.  Normal central venous pressure (3 mmHg).  Aortic valve area is 1.63 cm2; peak velocity is 2.63 m/s; mean gradient is 17 mmHg.  There is mild aortic valve stenosis.  The estimated ejection fraction is 60%.

## 2022-02-25 NOTE — ASSESSMENT & PLAN NOTE
Bleeding appears to be controlled after D&C 2/19.   - Agree with starting back Eliquis as soon as able

## 2022-02-25 NOTE — PROGRESS NOTES
[facility] Medicine  Telemedicine Progress Note    Patient Name: Vicky Alvarado  MRN: [id number]  Patient Class: IP- Inpatient   Admission Date: 2/18/2022  Length of Stay: 7 days  Attending Physician: Debra Carreon MD  Primary Care Provider: Gordy Cordero MD          Subjective:     Principal Problem:Vaginal bleeding        HPI:  Vicky Alvarado is a 56 year old female with HTN and history of atrial fibrillation s/p ablation 2 weeks ago (still on eliquis) who presented to [facility] ED on 2/18/22 complaining of vaginal bleeding for past 2 days. Patient reports she started noticed significant vaginal bleeding and pass clots on 2/16. Described some clots as the size of oranges. Reports associated abdominal cramping with vaginal bleeding. She is having to wear two large menstrual pads and changing them frequently. Standing up causes her to bleed more. Patient mentions that she has not menstruated in the last 5 years. Other associated symptoms included fatigue, dizziness, lightheadedness and NAPIER. Denies fever, chills, cough, chest pain, or sick contacts. Reports taking her last dose of eliquis on evening of 2/17 and took her antihypertensive medications this morning.      In the ED, patient found to be hypotensive with active vaginal hemorrhage. Hemoglobin at that time was 10.7.1 unit of emergency blood given. Gynecology consulted and recommending IV estrogen. Critical care medicine consulted for hemorrhagic shock.       Overview/Hospital Course:  Patient was admitted to ICU 2/18 for hemorrhagic shock due to vaginal bleeding was transfused 1 unit PRBCs.  Also started on low-dose norepinephrine infusion.  Gynecology consulted and started estrogen. Low doses of norepinephrine infusion weaned off.  Continued to have vaginal bleeding. Gync consulted s/p D&C for vaginal bleeding. She was given PRN blood products. Gyn started oral provera treatment in the interim to keep bleeding stable; 20 mg  TID. No signs of post procedure endometritis, no need for abx. Stepped down to  2/22. Post step down her Hgb drooped to 5.8 , HDS, given 2 units pRBC. Gyn following. No other source of bleed identified at this time.     Transferred to  on 1/23, transfused an additional 2u pRBCs with good recovery. GYN evaluated pt, and she was started on provera with good effect and dramatic improvement/near resolution in vaginal bleeding into 2/24.        This encounter was provided through telemedicine.  Patient was transferred to the telemedicine service on:  02/25/2022   The patient location is: [id number] A admitted 2/18/2022  9:22 AM.  Present with the patient at the time of the telemed/virtual assessment: Telepresenter    Interval History/Overnight Events:   Clinical record since admit reviewed.    Patient with no vaginal bleeding and H/H is stable; she recently had an ablation for A. Fib so needs to resume anticoagulation; was taking apixaban previously; Cardiology to make recs    Review of Systems   Constitutional:  Positive for fatigue. Negative for activity change and fever.   Respiratory:  Negative for cough.    Gastrointestinal:  Negative for diarrhea and vomiting.   Genitourinary:  Negative for vaginal bleeding.   Neurological:  Negative for dizziness and light-headedness.      Inpatient Medications:  Scheduled Meds:   atorvastatin  40 mg Oral Daily    DULoxetine  120 mg Oral Daily    lisinopriL  20 mg Oral Daily    medroxyPROGESTERone  20 mg Oral TID    metoprolol succinate  50 mg Oral Daily    pantoprazole  40 mg Oral Daily    polyethylene glycol  17 g Oral Daily    propafenone  225 mg Oral Q8H     Continuous Infusions:  PRN Meds:.sodium chloride, acetaminophen, ALPRAZolam, carboprost, conjugated estrogens in NS 50 mL IVPB, diphenhydrAMINE, miSOPROStoL, ondansetron, sodium chloride 0.9%, traZODone      Objective:     Temp:  [97.3 °F (36.3 °C)-97.8 °F (36.6 °C)] 97.4 °F (36.3 °C)  Pulse:  [57-81] 63  Resp:   [18-20] 18  SpO2:  [96 %-100 %] 99 %  BP: ()/(46-67) 111/55      Intake/Output Summary (Last 24 hours) at 2/25/2022 1636  Last data filed at 2/25/2022 0600  Gross per 24 hour   Intake --   Output 2 ml   Net -2 ml        Body mass index is 49.55 kg/m².    Physical Exam  Vitals and nursing note reviewed.   Constitutional:       General: She is not in acute distress.     Appearance: Normal appearance. She is obese.   HENT:      Head: Normocephalic and atraumatic.   Eyes:      General: No scleral icterus.        Right eye: No discharge.         Left eye: No discharge.      Extraocular Movements: Extraocular movements intact.   Cardiovascular:      Rate and Rhythm: Normal rate.   Pulmonary:      Effort: Pulmonary effort is normal. No tachypnea or respiratory distress.   Neurological:      General: No focal deficit present.      Mental Status: She is alert and oriented to person, place, and time.      Cranial Nerves: No cranial nerve deficit.      Motor: No weakness.   Psychiatric:         Mood and Affect: Mood and affect normal.         Speech: Speech normal.         Behavior: Behavior is cooperative.         Thought Content: Thought content normal.        Labs:  Recent Results (from the past 24 hour(s))   CBC auto differential    Collection Time: 02/25/22  5:17 AM   Result Value Ref Range    WBC 6.60 3.90 - 12.70 K/uL    RBC 3.06 (L) 4.00 - 5.40 M/uL    Hemoglobin 9.6 (L) 12.0 - 16.0 g/dL    Hematocrit 30.9 (L) 37.0 - 48.5 %     (H) 82 - 98 fL    MCH 31.4 (H) 27.0 - 31.0 pg    MCHC 31.1 (L) 32.0 - 36.0 g/dL    RDW 16.6 (H) 11.5 - 14.5 %    Platelets 151 150 - 450 K/uL    MPV 10.3 9.2 - 12.9 fL    Immature Granulocytes 1.4 (H) 0.0 - 0.5 %    Gran # (ANC) 4.2 1.8 - 7.7 K/uL    Immature Grans (Abs) 0.09 (H) 0.00 - 0.04 K/uL    Lymph # 1.4 1.0 - 4.8 K/uL    Mono # 0.6 0.3 - 1.0 K/uL    Eos # 0.3 0.0 - 0.5 K/uL    Baso # 0.05 0.00 - 0.20 K/uL    nRBC 1 (A) 0 /100 WBC    Gran % 63.7 38.0 - 73.0 %    Lymph % 21.1  18.0 - 48.0 %    Mono % 9.2 4.0 - 15.0 %    Eosinophil % 3.8 0.0 - 8.0 %    Basophil % 0.8 0.0 - 1.9 %    Differential Method Automated    Magnesium    Collection Time: 02/25/22  5:17 AM   Result Value Ref Range    Magnesium 1.7 1.6 - 2.6 mg/dL   Phosphorus    Collection Time: 02/25/22  5:17 AM   Result Value Ref Range    Phosphorus 2.8 2.7 - 4.5 mg/dL   Comprehensive Metabolic Panel    Collection Time: 02/25/22  5:17 AM   Result Value Ref Range    Sodium 138 136 - 145 mmol/L    Potassium 4.2 3.5 - 5.1 mmol/L    Chloride 105 95 - 110 mmol/L    CO2 24 23 - 29 mmol/L    Glucose 105 70 - 110 mg/dL    BUN 11 6 - 20 mg/dL    Creatinine 0.8 0.5 - 1.4 mg/dL    Calcium 9.2 8.7 - 10.5 mg/dL    Total Protein 6.2 6.0 - 8.4 g/dL    Albumin 2.7 (L) 3.5 - 5.2 g/dL    Total Bilirubin 0.5 0.1 - 1.0 mg/dL    Alkaline Phosphatase 146 (H) 55 - 135 U/L    AST 55 (H) 10 - 40 U/L    ALT 15 10 - 44 U/L    Anion Gap 9 8 - 16 mmol/L    eGFR if African American >60.0 >60 mL/min/1.73 m^2    eGFR if non African American >60.0 >60 mL/min/1.73 m^2        Lab Results   Component Value Date    [id number] Negative 02/18/2022       Recent Labs   Lab 02/23/22 2036 02/24/22  0305 02/25/22  0517   WBC 10.60 8.01 6.60   LYMPH 13.2*  1.4 16.4*  1.3 21.1  1.4   HGB 10.1* 9.2* 9.6*   HCT 30.4* 28.2* 30.9*   * 145* 151     Recent Labs   Lab 02/23/22  0302 02/24/22  0613 02/24/22  0829 02/25/22  0517    138  --  138   K 4.7 4.3  --  4.2    105  --  105   CO2 24 21*  --  24   BUN 10 9  --  11   CREATININE 1.0 0.8  --  0.8   * 108  --  105   CALCIUM 9.0 8.8  --  9.2   MG 1.8 1.9 1.7 1.7   PHOS 2.0* 2.2* 2.2* 2.8     Recent Labs   Lab 02/23/22  0302 02/24/22  0613 02/25/22  0517   ALKPHOS 146* 147* 146*   ALT 12 14 15   AST 60* 60* 55*   ALBUMIN 2.8* 2.7* 2.7*   PROT 6.2 6.1 6.2   BILITOT 0.7 1.1* 0.5        No results for input(s): DDIMER, FERRITIN, CRP, LDH, BNP, TROPONINI, CPK in the last 72 hours.    Invalid input(s):  PROCALCITONIN    All labs within the last 24 hours were reviewed.     Microbiology:  Microbiology Results (last 7 days)       Procedure Component Value Units Date/Time    Urine culture [[id number]] Collected: 02/18/22 1141    Order Status: Completed Specimen: Urine Updated: 02/19/22 1413     Urine Culture, Routine No significant growth    Narrative:      Specimen Source->Urine              Imaging  ECG Results              EKG 12-lead (Final result)  Result time 02/18/22 14:31:29      Final result by Interface, Lab In [facility] (02/18/22 14:31:29)                   Narrative:    Test Reason : R42,    Vent. Rate : 074 BPM     Atrial Rate : 074 BPM     P-R Int : 160 ms          QRS Dur : 086 ms      QT Int : 398 ms       P-R-T Axes : 054 040 035 degrees     QTc Int : 441 ms    Normal sinus rhythm  Normal ECG  No previous ECGs available  Confirmed by Ramy MARTÍNEZ MD ([id number]) on 2/18/2022 2:31:23 PM    Referred By: [email or web address]   SELF           Confirmed By:Ramy MARTÍNEZ MD                                    Results for orders placed in visit on 06/29/21    Echo    Interpretation Summary  · The left ventricle is normal in size with concentric remodeling and normal systolic function. The estimated ejection fraction is 60%.  · Normal left ventricular diastolic function.  · Normal right ventricular size with normal right ventricular systolic function.  · Normal central venous pressure (3 mmHg).  · Aortic valve area is 1.63 cm2; peak velocity is 2.63 m/s; mean gradient is 17 mmHg.  · There is mild aortic valve stenosis.  · The estimated ejection fraction is 60%.      US Pelvis Comp with Transvag NON-OB (xpd  Narrative: EXAMINATION:  US PELVIS COMP WITH TRANSVAG NON-OB (XPD)    CLINICAL HISTORY:  post menopausal vaginal bleeding, on eliquis;    TECHNIQUE:  Transabdominal sonography of the pelvis was performed, followed by transvaginal sonography to better evaluate the uterus and ovaries.    COMPARISON:  None.    FINDINGS:  The uterus  measures a proximally 11.0 x 8.6 x 6.8 cm.  The endometrial stripe is thickened measuring up to 1.7 cm.  There is heterogeneous avascular material within the lower uterine segment extending to the cervical canal suggesting blood products.  There is a partially calcified fibroid measuring approximately 2.9 x 2.3 x 3.1 cm.    The ovaries are not identified.  There is trace fluid in the pelvis.  There are a few prominent vessels within the left adnexa, correlation with any history of pelvic congestion syndrome.  Impression: This report was flagged in Epic as abnormal.    Endometrial thickening, abnormal in this reportedly postmenopausal patient.  There is heterogeneous avascular material within the lower uterine segment extending to the cervical canal concerning for blood products.  Correlation is advised, consultation with OBGYN is recommended.    Uterine leiomyoma.    Electronically signed by: Rajiv Fernando MD  Date:    02/18/2022  Time:    12:58      All imaging within the last 24 hours was reviewed.       Discharge Planning   RAYO: 2/27/2022     Code Status: Full Code   Is the patient medically ready for discharge?: No    Reason for patient still in hospital (select all that apply): Patient trending condition, Treatment, and Consult recommendations  Discharge Plan A: Home            Assessment/Plan:      * Vaginal bleeding  - Interval history and physical exam findings as described above  - MICU course and blood transfusion history as documented  - Evaluated by GYN, on provera with resolution of bleeding currently  - H/H stable  - Continuing to hold eliquis  - Continuing to trend H/H, will transfuse to maintain Hb>7  - Will continue to monitor    Acute post-hemorrhagic anemia  Previously with microcytic anemia but now with elevated MCV  -repeat ferritin and also B12, folate  -transfuse for acute bleeding or hemoglobin less than 7      Thrombocytopenia  - Mild, no longer with aggressive bleeding  - Will continue to  monitor; stable currently    Recurrent major depressive disorder, in partial remission  - Currently asymptomatic  - Continue home SNRI regimen    Gastroesophageal reflux disease without esophagitis  - Currently asymptomatic  - Continue home PPI regimen    Pure hypercholesterolemia  - Continue home statin regimen    Morbid obesity with BMI of 45.0-49.9, adult  - Body mass index is 49.55 kg/m².  - Pt counseled on dietary and lifestyle modifications in relation to health  - Consider dietary/nutrition consult outpatient    Atrial Fibrillation (paroxysmal)  - S/p 2 cardioversion.  She is now in sinus.  - continue propafenone  - resume metoprolol.  Increase to home dose as tolerated.  - holding anticoagulation at this time with anemia requiring transfusion; consulting with EP regarding preferred agent in light of recent severe bleeding; plan to monitor in hospital once anticoagulation resumed  - Will continue to monitor on tele    Essential hypertension  - Working to optimize BP control   - Continue home regimen of metoprolol and lisinopril  - Will continue to monitor and further titrate antihypertensives as clinically indicated       VTE Risk Mitigation (From admission, onward)         Ordered     IP VTE HIGH RISK PATIENT  Once         02/19/22 1655     Place sequential compression device  Until discontinued         02/19/22 1655     Place AL hose  Until discontinued         02/19/22 1655                I have assessed these findings virtually using a telemed platform and with assistance of the bedside nurse.        The attending portion of this evaluation, treatment, and documentation was performed per Debra Carreon MD via Telemedicine AudioVisual using the secure [email or web address] software platform with 2 way audio/video. The provider was located off-site and the patient is located in the hospital. The aforementioned video software was utilized to document the relevant history and physical exam    Debra Carreon MD  Department  of [facility] Medicine   [facility] - [facility]

## 2022-02-25 NOTE — SUBJECTIVE & OBJECTIVE
This encounter was provided through telemedicine.  Patient was transferred to the telemedicine service on:  02/25/2022   The patient location is: [id number]/642 A admitted 2/18/2022  9:22 AM.  Present with the patient at the time of the telemed/virtual assessment: Telepresenter    Interval History/Overnight Events:   Clinical record since admit reviewed.    Patient with no vaginal bleeding and H/H is stable; she recently had an ablation for A. Fib so needs to resume anticoagulation; was taking apixaban previously; Cardiology to make recs    Review of Systems   Constitutional:  Positive for fatigue. Negative for activity change and fever.   Respiratory:  Negative for cough.    Gastrointestinal:  Negative for diarrhea and vomiting.   Genitourinary:  Negative for vaginal bleeding.   Neurological:  Negative for dizziness and light-headedness.      Inpatient Medications:  Scheduled Meds:   atorvastatin  40 mg Oral Daily    DULoxetine  120 mg Oral Daily    lisinopriL  20 mg Oral Daily    medroxyPROGESTERone  20 mg Oral TID    metoprolol succinate  50 mg Oral Daily    pantoprazole  40 mg Oral Daily    polyethylene glycol  17 g Oral Daily    propafenone  225 mg Oral Q8H     Continuous Infusions:  PRN Meds:.sodium chloride, acetaminophen, ALPRAZolam, carboprost, conjugated estrogens in NS 50 mL IVPB, diphenhydrAMINE, miSOPROStoL, ondansetron, sodium chloride 0.9%, traZODone      Objective:     Temp:  [97.3 °F (36.3 °C)-97.8 °F (36.6 °C)] 97.4 °F (36.3 °C)  Pulse:  [57-81] 63  Resp:  [18-20] 18  SpO2:  [96 %-100 %] 99 %  BP: ()/(46-67) 111/55      Intake/Output Summary (Last 24 hours) at 2/25/2022 1636  Last data filed at 2/25/2022 0600  Gross per 24 hour   Intake --   Output 2 ml   Net -2 ml        Body mass index is 49.55 kg/m².    Physical Exam  Vitals and nursing note reviewed.   Constitutional:       General: She is not in acute distress.     Appearance: Normal appearance. She is obese.   HENT:      Head: Normocephalic  and atraumatic.   Eyes:      General: No scleral icterus.        Right eye: No discharge.         Left eye: No discharge.      Extraocular Movements: Extraocular movements intact.   Cardiovascular:      Rate and Rhythm: Normal rate.   Pulmonary:      Effort: Pulmonary effort is normal. No tachypnea or respiratory distress.   Neurological:      General: No focal deficit present.      Mental Status: She is alert and oriented to person, place, and time.      Cranial Nerves: No cranial nerve deficit.      Motor: No weakness.   Psychiatric:         Mood and Affect: Mood and affect normal.         Speech: Speech normal.         Behavior: Behavior is cooperative.         Thought Content: Thought content normal.        Labs:  Recent Results (from the past 24 hour(s))   CBC auto differential    Collection Time: 02/25/22  5:17 AM   Result Value Ref Range    WBC 6.60 3.90 - 12.70 K/uL    RBC 3.06 (L) 4.00 - 5.40 M/uL    Hemoglobin 9.6 (L) 12.0 - 16.0 g/dL    Hematocrit 30.9 (L) 37.0 - 48.5 %     (H) 82 - 98 fL    MCH 31.4 (H) 27.0 - 31.0 pg    MCHC 31.1 (L) 32.0 - 36.0 g/dL    RDW 16.6 (H) 11.5 - 14.5 %    Platelets 151 150 - 450 K/uL    MPV 10.3 9.2 - 12.9 fL    Immature Granulocytes 1.4 (H) 0.0 - 0.5 %    Gran # (ANC) 4.2 1.8 - 7.7 K/uL    Immature Grans (Abs) 0.09 (H) 0.00 - 0.04 K/uL    Lymph # 1.4 1.0 - 4.8 K/uL    Mono # 0.6 0.3 - 1.0 K/uL    Eos # 0.3 0.0 - 0.5 K/uL    Baso # 0.05 0.00 - 0.20 K/uL    nRBC 1 (A) 0 /100 WBC    Gran % 63.7 38.0 - 73.0 %    Lymph % 21.1 18.0 - 48.0 %    Mono % 9.2 4.0 - 15.0 %    Eosinophil % 3.8 0.0 - 8.0 %    Basophil % 0.8 0.0 - 1.9 %    Differential Method Automated    Magnesium    Collection Time: 02/25/22  5:17 AM   Result Value Ref Range    Magnesium 1.7 1.6 - 2.6 mg/dL   Phosphorus    Collection Time: 02/25/22  5:17 AM   Result Value Ref Range    Phosphorus 2.8 2.7 - 4.5 mg/dL   Comprehensive Metabolic Panel    Collection Time: 02/25/22  5:17 AM   Result Value Ref Range     Sodium 138 136 - 145 mmol/L    Potassium 4.2 3.5 - 5.1 mmol/L    Chloride 105 95 - 110 mmol/L    CO2 24 23 - 29 mmol/L    Glucose 105 70 - 110 mg/dL    BUN 11 6 - 20 mg/dL    Creatinine 0.8 0.5 - 1.4 mg/dL    Calcium 9.2 8.7 - 10.5 mg/dL    Total Protein 6.2 6.0 - 8.4 g/dL    Albumin 2.7 (L) 3.5 - 5.2 g/dL    Total Bilirubin 0.5 0.1 - 1.0 mg/dL    Alkaline Phosphatase 146 (H) 55 - 135 U/L    AST 55 (H) 10 - 40 U/L    ALT 15 10 - 44 U/L    Anion Gap 9 8 - 16 mmol/L    eGFR if African American >60.0 >60 mL/min/1.73 m^2    eGFR if non African American >60.0 >60 mL/min/1.73 m^2        Lab Results   Component Value Date    [id number] Negative 02/18/2022       Recent Labs   Lab 02/23/22 2036 02/24/22 0305 02/25/22  0517   WBC 10.60 8.01 6.60   LYMPH 13.2*  1.4 16.4*  1.3 21.1  1.4   HGB 10.1* 9.2* 9.6*   HCT 30.4* 28.2* 30.9*   * 145* 151     Recent Labs   Lab 02/23/22 0302 02/24/22  0613 02/24/22  0829 02/25/22  0517    138  --  138   K 4.7 4.3  --  4.2    105  --  105   CO2 24 21*  --  24   BUN 10 9  --  11   CREATININE 1.0 0.8  --  0.8   * 108  --  105   CALCIUM 9.0 8.8  --  9.2   MG 1.8 1.9 1.7 1.7   PHOS 2.0* 2.2* 2.2* 2.8     Recent Labs   Lab 02/23/22 0302 02/24/22  0613 02/25/22  0517   ALKPHOS 146* 147* 146*   ALT 12 14 15   AST 60* 60* 55*   ALBUMIN 2.8* 2.7* 2.7*   PROT 6.2 6.1 6.2   BILITOT 0.7 1.1* 0.5        No results for input(s): DDIMER, FERRITIN, CRP, LDH, BNP, TROPONINI, CPK in the last 72 hours.    Invalid input(s): PROCALCITONIN    All labs within the last 24 hours were reviewed.     Microbiology:  Microbiology Results (last 7 days)       Procedure Component Value Units Date/Time    Urine culture [[id number]] Collected: 02/18/22 1141    Order Status: Completed Specimen: Urine Updated: 02/19/22 1413     Urine Culture, Routine No significant growth    Narrative:      Specimen Source->Urine              Imaging  ECG Results              EKG 12-lead (Final result)  Result  time 02/18/22 14:31:29      Final result by Interface, Lab In [facility] (02/18/22 14:31:29)                   Narrative:    Test Reason : R42,    Vent. Rate : 074 BPM     Atrial Rate : 074 BPM     P-R Int : 160 ms          QRS Dur : 086 ms      QT Int : 398 ms       P-R-T Axes : 054 040 035 degrees     QTc Int : 441 ms    Normal sinus rhythm  Normal ECG  No previous ECGs available  Confirmed by Ramy MARTÍNEZ MD ([id number]) on 2/18/2022 2:31:23 PM    Referred By: [email or web address]   SELF           Confirmed By:Ramy MARTÍNEZ MD                                    Results for orders placed in visit on 06/29/21    Echo    Interpretation Summary  · The left ventricle is normal in size with concentric remodeling and normal systolic function. The estimated ejection fraction is 60%.  · Normal left ventricular diastolic function.  · Normal right ventricular size with normal right ventricular systolic function.  · Normal central venous pressure (3 mmHg).  · Aortic valve area is 1.63 cm2; peak velocity is 2.63 m/s; mean gradient is 17 mmHg.  · There is mild aortic valve stenosis.  · The estimated ejection fraction is 60%.      US Pelvis Comp with Transvag NON-OB (xpd  Narrative: EXAMINATION:  US PELVIS COMP WITH TRANSVAG NON-OB (XPD)    CLINICAL HISTORY:  post menopausal vaginal bleeding, on eliquis;    TECHNIQUE:  Transabdominal sonography of the pelvis was performed, followed by transvaginal sonography to better evaluate the uterus and ovaries.    COMPARISON:  None.    FINDINGS:  The uterus measures a proximally 11.0 x 8.6 x 6.8 cm.  The endometrial stripe is thickened measuring up to 1.7 cm.  There is heterogeneous avascular material within the lower uterine segment extending to the cervical canal suggesting blood products.  There is a partially calcified fibroid measuring approximately 2.9 x 2.3 x 3.1 cm.    The ovaries are not identified.  There is trace fluid in the pelvis.  There are a few prominent vessels within the left adnexa,  correlation with any history of pelvic congestion syndrome.  Impression: This report was flagged in Epic as abnormal.    Endometrial thickening, abnormal in this reportedly postmenopausal patient.  There is heterogeneous avascular material within the lower uterine segment extending to the cervical canal concerning for blood products.  Correlation is advised, consultation with OBGYN is recommended.    Uterine leiomyoma.    Electronically signed by: Rajiv Fernando MD  Date:    02/18/2022  Time:    12:58      All imaging within the last 24 hours was reviewed.       Discharge Planning   RAYO: 2/27/2022     Code Status: Full Code   Is the patient medically ready for discharge?: No    Reason for patient still in hospital (select all that apply): Patient trending condition, Treatment, and Consult recommendations  Discharge Plan A: Home

## 2022-02-25 NOTE — PT/OT/SLP PROGRESS
Physical Therapy Discharge       Patient Name:  Vicky Alvarado   MRN:  [id number]    PT presented to pt room this AM. Pt reports she has been ambulating independently during admission, reports no concerns, is at her baseline mobility.  PT provided education to pt regarding importance of maintaining frequent activity and ambulating while admitted to maintain independent level of mobility. Pt verbalized understanding. Pt does not require further acute skilled therapy intervention. Discharge from PT services and re-consult if pt experiences a change in status. No billable units this date. No billable units.

## 2022-02-25 NOTE — HPI
Vicky Alvarado is a 56 year old female with HTN and history of atrial fibrillation s/p ablation 2 weeks ago (still on eliquis) who presented to [facility] ED on 2/18/22 complaining of vaginal bleeding for past 2 days. Patient reports she started noticed significant vaginal bleeding and pass clots on 2/16. Described some clots as the size of oranges. Reports associated abdominal cramping with vaginal bleeding. She is having to wear two large menstrual pads and changing them frequently. Standing up causes her to bleed more. Patient mentions that she has not menstruated in the last 5 years. Other associated symptoms included fatigue, dizziness, lightheadedness and NAPIER. Denies fever, chills, cough, chest pain, or sick contacts. Reports taking her last dose of eliquis on evening of 2/17 and took her antihypertensive medications this morning.      In the ED, patient found to be hypotensive with active vaginal hemorrhage. Hemoglobin at that time was 10.7.1 unit of emergency blood given. Gynecology consulted and recommending IV estrogen. Critical care medicine consulted for hemorrhagic shock.     EP consulted for restarting anticoagulation after resolution of hemorrhagic shock.

## 2022-02-25 NOTE — CONSULTS
Thank you for your consult to [facility]. We have reviewed the patient chart. This patient does meet criteria for [facility] service at this time. Will assume care on 02/25/22 at 7AM.    Debra Carreon MD

## 2022-02-25 NOTE — ASSESSMENT & PLAN NOTE
- S/p 2 cardioversion.  She is now in sinus.  - continue propafenone  - resume metoprolol.  Increase to home dose as tolerated.  - holding anticoagulation at this time with anemia requiring transfusion; consulting with EP regarding preferred agent in light of recent severe bleeding; plan to monitor in hospital once anticoagulation resumed  - Will continue to monitor on tele

## 2022-02-25 NOTE — ASSESSMENT & PLAN NOTE
- Interval history and physical exam findings as described above  - MICU course and blood transfusion history as documented  - Evaluated by GYN, on provera with resolution of bleeding currently  - H/H stable  - Continuing to hold eliquis  - Continuing to trend H/H, will transfuse to maintain Hb>7  - Will continue to monitor

## 2022-02-25 NOTE — PLAN OF CARE
Pt report no bleeding this shift. Bm x1. Poc reviewed with pt and family is at bedside. There is no s/s of distress.   Problem: Adult Inpatient Plan of Care  Goal: Plan of Care Review  Outcome: Ongoing, Progressing  Goal: Optimal Comfort and Wellbeing  Outcome: Ongoing, Progressing     Problem: Skin Injury Risk Increased  Goal: Skin Health and Integrity  Outcome: Ongoing, Progressing     Problem: Fall Injury Risk  Goal: Absence of Fall and Fall-Related Injury  Outcome: Ongoing, Progressing     Problem: Fatigue  Goal: Improved Activity Tolerance  Outcome: Ongoing, Progressing

## 2022-02-25 NOTE — ASSESSMENT & PLAN NOTE
Follows with Dr Hawley as outpt. [id number] 3 and on Eliquis at home. Admitted for hemorrhagic shock due to vaginal bleed. Now stable and no signs of significant bleeding.  - Continue Toprol 50 mg daily  - Continue Propafenone 225 mg TID  - Start back Eliquis 5 mg BID as soon as able and monitor closely  - Can follow-up with Dr Hawley for consideration of Watchman as outpt

## 2022-02-25 NOTE — CONSULTS
[facility] Surg  Cardiac Electrophysiology  Consult Note    Admission Date: 2/18/2022  Code Status: Full Code   Attending Provider: Debra Carreon MD  Consulting Provider: Easton Santiago MD  Principal Problem:Vaginal bleeding    Inpatient consult to Electrophysiology  Consult performed by: Easton Santiago MD  Consult ordered by: Debra Carreon MD        Subjective:     Chief Complaint:  Anticoagulation resumption     HPI:   Vicky Alvarado is a 56 year old female with HTN and history of atrial fibrillation s/p ablation 2 weeks ago (still on eliquis) who presented to [facility] ED on 2/18/22 complaining of vaginal bleeding for past 2 days. Patient reports she started noticed significant vaginal bleeding and pass clots on 2/16. Described some clots as the size of oranges. Reports associated abdominal cramping with vaginal bleeding. She is having to wear two large menstrual pads and changing them frequently. Standing up causes her to bleed more. Patient mentions that she has not menstruated in the last 5 years. Other associated symptoms included fatigue, dizziness, lightheadedness and NAPIER. Denies fever, chills, cough, chest pain, or sick contacts. Reports taking her last dose of eliquis on evening of 2/17 and took her antihypertensive medications this morning.      In the ED, patient found to be hypotensive with active vaginal hemorrhage. Hemoglobin at that time was 10.7.1 unit of emergency blood given. Gynecology consulted and recommending IV estrogen. Critical care medicine consulted for hemorrhagic shock.     EP consulted for restarting anticoagulation after resolution of hemorrhagic shock.      Past Medical History:   Diagnosis Date    Anticoagulant long-term use     Arthritis     Atrial fibrillation     cardioversion    Atrial fibrillation with RVR     Avascular necrosis     L hand    CHF (congestive heart failure)     Depression     Encounter for blood transfusion 07/22/2020    GERD (gastroesophageal  reflux disease)     Hx of psychiatric care     Hypertension     Obese     Psychiatric problem     Sleep apnea        Past Surgical History:   Procedure Laterality Date    ABLATION OF ARRHYTHMOGENIC FOCUS FOR ATRIAL FIBRILLATION N/A 7/20/2020    Procedure: Ablation atrial fibrillation;  Surgeon: Gabriel Hawley MD;  Location: [facility] EP LAB;  Service: Cardiology;  Laterality: N/A;  afib, PVI, RFA, REENA, SHADY, anes, MB, 3 Prep    ABLATION OF ARRHYTHMOGENIC FOCUS FOR ATRIAL FIBRILLATION N/A 1/24/2022    Procedure: Ablation atrial fibrillation;  Surgeon: Gabriel Hawley MD;  Location: [facility] EP LAB;  Service: Cardiology;  Laterality: N/A;  afib/afl, PVI (re-do)/CTI, RFA, REENA (cx if SR), SHADY, anes, MB, 3 Prep    APPLICATION OF WOUND VACUUM-ASSISTED CLOSURE DEVICE Left 8/3/2020    Procedure: APPLICATION, WOUND VAC;  Surgeon: SEMAJ Márquez III, MD;  Location: [facility] OR 2ND FLR;  Service: Peripheral Vascular;  Laterality: Left;    APPLICATION OF WOUND VACUUM-ASSISTED CLOSURE DEVICE Left 8/6/2020    Procedure: APPLICATION, WOUND VAC;  Surgeon: SEMAJ Márquez III, MD;  [facility];  Service: Peripheral Vascular;  Laterality: Left;    CARPAL TUNNEL RELEASE Right 6/10/2020    Procedure: RELEASE, CARPAL TUNNEL - RIGHT;  Surgeon: Adelaida Hall MD;  Location: [facility];  Service: Orthopedics;  Laterality: Right;  GENERAL AND REGIONAL    CARPAL TUNNEL RELEASE Left 5/5/2021    Procedure: RELEASE, CARPAL TUNNEL, LEFT;  Surgeon: Adelaida Hall MD;  Location: [facility] OR;  Service: Orthopedics;  Laterality: Left;  GENERAL & REGIONAL IN PACU    CLOSURE OF WOUND Left 8/6/2020    Procedure: CLOSURE, WOUND;  Surgeon: SEMAJ Márquez III, MD;  [facility];  Service: Peripheral Vascular;  Laterality: Left;  Complex    COLONOSCOPY N/A 8/17/2021    Procedure: COLONOSCOPY Suprep;  Surgeon: Loco Deluca MD;  Location: [facility] ENDO;  Service: Endoscopy;  Laterality: N/A;     ESOPHAGOGASTRODUODENOSCOPY N/A 8/17/2021    Procedure: EGD (ESOPHAGOGASTRODUODENOSCOPY);  Surgeon: Loco Deluca MD;  Location: [facility];  Service: Endoscopy;  Laterality: N/A;  Patient is schedule to have her Covid test on 08/14/2021 at the [facility] @ 9:30am. [place].    EXPLORATION OF FEMORAL ARTERY Left 7/21/2020    Procedure: EXPLORATION, ARTERY, FEMORAL;  Surgeon: SEMAJ Márquez III, MD;  Location: [facility] OR [address] [place] [address];  Service: Peripheral Vascular;  Laterality: Left;  1. Urgent Direct repair, L SFA branch laceration    FOOT SURGERY      HYSTEROSCOPY WITH DILATION AND CURETTAGE OF UTERUS N/A 2/19/2022    Procedure: HYSTEROSCOPY, WITH DILATION AND CURETTAGE OF UTERUS;  Surgeon: Shaen Palomo MD;  [facility];  Service: OB/GYN;  Laterality: N/A;    TREATMENT OF CARDIAC ARRHYTHMIA N/A 1/29/2020    Procedure: CARDIOVERSION;  Surgeon: Gabriel Hawley MD;  Location: [facility] EP LAB;  Service: Cardiology;  Laterality: N/A;  af, demi, dccv, anes, mb, 345    TREATMENT OF CARDIAC ARRHYTHMIA  1/24/2022    Procedure: Cardioversion or Defibrillation;  Surgeon: Gabriel Hawley MD;  Location: [facility] EP LAB;  Service: Cardiology;;    WOUND DEBRIDEMENT Left 8/6/2020    Procedure: DEBRIDEMENT, WOUND;  Surgeon: SEMAJ Márquez III, MD;  Location: [facility] OR [address] [place] [address];  Service: Peripheral Vascular;  Laterality: Left;       Review of patient's allergies indicates:   Allergen Reactions    Flecainide Shortness Of Breath and Swelling       Current Facility-Administered Medications on File Prior to Encounter   Medication    sodium chloride 0.9% bolus 1,000 mL     Current Outpatient Medications on File Prior to Encounter   Medication Sig    acetaminophen (TYLENOL) 500 MG tablet Take 2 tablets (1,000 mg) by mouth at onset of pain or headache, may repeat if needed.    ALPRAZolam (XANAX) 0.5 MG tablet Take 1 tablet (0.5 mg total) by mouth 2 (two) times daily as needed for Anxiety.    apixaban (ELIQUIS) 5 mg  Tab Take 1 tablet (5 mg total) by mouth 2 (two) times daily.    atorvastatin (LIPITOR) 40 MG tablet Take 1 tablet (40 mg total) by mouth once daily.    b complex vitamins capsule Take 1 capsule by mouth once daily.    colchicine (COLCRYS) 0.6 mg tablet For gout attack: Take TWO tablets by mouth, then, 2 hours later, take ONE additional tablet. Then take 1 tablet twice daily only if still needed for gout pain. (Patient taking differently: For gout attack: Take TWO tablets by mouth, then, 2 hours later, take ONE additional tablet. Then take 1 tablet twice daily only if still needed for gout pain.)    DULoxetine (CYMBALTA) 60 MG capsule Take 2 capsules (120 mg total) by mouth once daily.    ferrous sulfate (SLOW RELEASE IRON) 142 mg (45 mg iron) TbSR Take 1 tablet by mouth once daily.    furosemide (LASIX) 40 MG tablet Take 1 tablet (40 mg total) by mouth 2 (two) times daily.    gluc-shikha-[facility]-I-mqyn-reymundo-bor 750 mg-644 mg- 30 mg-1 mg Tab Take 1 tablet by mouth once daily.     krill/om-3/dha/epa/phospho/ast (MEGARED OMEGA-3 KRILL OIL ORAL) Take 1 capsule by mouth once daily.    lisinopriL (PRINIVIL,ZESTRIL) 40 MG tablet Take 1 tablet (40 mg total) by mouth once daily.    melatonin 10 mg Tab Take 1-2 tablets by mouth nightly as needed (Sleep).    metoprolol succinate (TOPROL-XL) 50 MG 24 hr tablet Take 1 tablet (50 mg total) by mouth 2 (two) times daily.    multivitamin (THERAGRAN) per tablet Take 1 tablet by mouth once daily.    NIFEdipine (PROCARDIA-XL) 90 MG (OSM) 24 hr tablet Take 1 tablet (90 mg total) by mouth once daily.    omeprazole (PRILOSEC) 20 MG capsule TAKE 1 CAPSULE BY MOUTH ONCE DAILY    polyethylene glycol (MIRALAX) 17 gram PwPk Take by mouth as needed (constipation).    predniSONE (DELTASONE) 10 MG tablet Take 1 tablet by mouth daily (Patient taking differently: Take by mouth daily as needed (alternates with Colchicine).)    propafenone (RYTHMOL) 225 MG Tab Take 1 tablet (225 mg total)  by mouth every 8 (eight) hours.    traZODone (DESYREL) 100 MG tablet Take 1 tablet (100 mg total) by mouth nightly as needed for Insomnia.    UNABLE TO FIND Take 1 tablet by mouth once daily. Trino's Leg Cramps    pantoprazole (PROTONIX) 40 MG tablet Take 1 tablet (40 mg total) by mouth once daily. (Patient not taking: Reported on 2022)    potassium chloride SA (K-DUR,KLOR-CON) 20 MEQ tablet Take 1 tablet (20 mEq total) by mouth once daily.     Family History       Problem Relation (Age of Onset)    Cataracts Mother    Diabetes Father    Hypertension Mother, Father, Sister, Brother    Thyroid disease Mother          Tobacco Use    Smoking status: Former Smoker     Types: Cigarettes     Quit date: 2019     Years since quittin.6    Smokeless tobacco: Never Used   Substance and Sexual Activity    Alcohol use: No    Drug use: No    Sexual activity: Not on file     Review of Systems   Constitutional: Negative.   HENT: Negative.     Eyes: Negative.    Cardiovascular: Negative.    Respiratory: Negative.     Endocrine: Negative.    Hematologic/Lymphatic: Negative.    Skin: Negative.    Musculoskeletal: Negative.    Gastrointestinal: Negative.    Genitourinary: Negative.    Neurological: Negative.    Psychiatric/Behavioral: Negative.     Allergic/Immunologic: Negative.    Objective:     Vital Signs (Most Recent):  Temp: 97.4 °F (36.3 °C) (22 1552)  Pulse: 63 (22 1552)  Resp: 18 (22 0533)  BP: (!) 111/55 (22 1552)  SpO2: 99 % (22 1552)   Vital Signs (24h Range):  Temp:  [97.3 °F (36.3 °C)-97.8 °F (36.6 °C)] 97.4 °F (36.3 °C)  Pulse:  [57-81] 63  Resp:  [18-20] 18  SpO2:  [96 %-100 %] 99 %  BP: ()/(46-67) 111/55       Weight: (!) 139.3 kg (307 lb)  Body mass index is 49.55 kg/m².    SpO2: 99 %  O2 Device (Oxygen Therapy): room air    Physical Exam  Constitutional:       General: She is not in acute distress.     Appearance: She is obese. She is not ill-appearing.       Comments: Pleasant middle age female   HENT:      Head: Normocephalic.      Mouth/Throat:      Mouth: Mucous membranes are moist.   Eyes:      Extraocular Movements: Extraocular movements intact.   Cardiovascular:      Rate and Rhythm: Normal rate and regular rhythm.      Pulses: Normal pulses.      Heart sounds: No murmur heard.  Pulmonary:      Effort: Pulmonary effort is normal. No respiratory distress.   Abdominal:      General: Bowel sounds are normal. There is no distension.      Palpations: Abdomen is soft.      Tenderness: There is no abdominal tenderness.   Musculoskeletal:      Cervical back: Neck supple.      Right lower leg: No edema.      Left lower leg: No edema.   Skin:     General: Skin is warm.   Neurological:      General: No focal deficit present.      Mental Status: She is alert.       Significant Labs: BMP:   Recent Labs   Lab 02/24/22  0613 02/24/22  0829 02/25/22  0517     --  105     --  138   K 4.3  --  4.2     --  105   CO2 21*  --  24   BUN 9  --  11   CREATININE 0.8  --  0.8   CALCIUM 8.8  --  9.2   MG 1.9 1.7 1.7   , CMP:   Recent Labs   Lab 02/24/22 0613 02/25/22  0517    138   K 4.3 4.2    105   CO2 21* 24    105   BUN 9 11   CREATININE 0.8 0.8   CALCIUM 8.8 9.2   PROT 6.1 6.2   ALBUMIN 2.7* 2.7*   BILITOT 1.1* 0.5   ALKPHOS 147* 146*   AST 60* 55*   ALT 14 15   ANIONGAP 12 9   ESTGFRAFRICA >60.0 >60.0   EGFRNONAA >60.0 >60.0   , CBC:   Recent Labs   Lab 02/23/22 2036 02/24/22  0305 02/25/22  0517   WBC 10.60 8.01 6.60   HGB 10.1* 9.2* 9.6*   HCT 30.4* 28.2* 30.9*   * 145* 151   , INR: No results for input(s): INR, PROTIME in the last 48 hours., Lipid Panel No results for input(s): CHOL, HDL, LDLCALC, TRIG, CHOLHDL in the last 48 hours., and Troponin No results for input(s): TROPONINI in the last 48 hours.    Significant Imaging:   TTE 6/30/2021  · The left ventricle is normal in size with concentric remodeling and normal systolic  function. The estimated ejection fraction is 60%.  · Normal left ventricular diastolic function.  · Normal right ventricular size with normal right ventricular systolic function.  · Normal central venous pressure (3 mmHg).  · Aortic valve area is 1.63 cm2; peak velocity is 2.63 m/s; mean gradient is 17 mmHg.  · There is mild aortic valve stenosis.  · The estimated ejection fraction is 60%.       CHADS2 for A-FIB Stroke Risk  Age?: < 65 years old  CHF history: No  HTN history: Yes  Previous Stroke Sx or TIA: No  Vascular Disease History?: No  Diabetes Mellitus History: Yes  Female?: Yes  [id number] Total Score: 3      Assessment and Plan:     Acute post-hemorrhagic anemia  Bleeding appears to be controlled after D&C 2/19.   - Agree with starting back Eliquis as soon as able    Atrial Fibrillation (paroxysmal)  Follows with Dr Hawley as outpt. [id number] 3 and on Eliquis at home. Admitted for hemorrhagic shock due to vaginal bleed. Now stable and no signs of significant bleeding.  - Continue Toprol 50 mg daily  - Continue Propafenone 225 mg TID  - Start back Eliquis 5 mg BID as soon as able and monitor closely  - Can follow-up with Dr Hawley for consideration of Watchman as outpt       Case discussed with Dr Tanner Asencio MD.    Thank you for your consult. I will sign off. Please contact us if you have any additional questions.    Easton Santiago MD  Cardiac Electrophysiology  [facility] S[facility]

## 2022-02-26 LAB
BASOPHILS # BLD AUTO: 0.05 K/UL (ref 0–0.2)
BASOPHILS NFR BLD: 0.7 % (ref 0–1.9)
DIFFERENTIAL METHOD: ABNORMAL
EOSINOPHIL # BLD AUTO: 0.3 K/UL (ref 0–0.5)
EOSINOPHIL NFR BLD: 3.8 % (ref 0–8)
ERYTHROCYTE [DISTWIDTH] IN BLOOD BY AUTOMATED COUNT: 16 % (ref 11.5–14.5)
FERRITIN SERPL-MCNC: 48 NG/ML (ref 20–300)
FOLATE SERPL-MCNC: 11.1 NG/ML (ref 4–24)
HCT VFR BLD AUTO: 28.1 % (ref 37–48.5)
HGB BLD-MCNC: 9 G/DL (ref 12–16)
IMM GRANULOCYTES # BLD AUTO: 0.12 K/UL (ref 0–0.04)
IMM GRANULOCYTES NFR BLD AUTO: 1.7 % (ref 0–0.5)
LYMPHOCYTES # BLD AUTO: 1.3 K/UL (ref 1–4.8)
LYMPHOCYTES NFR BLD: 17.6 % (ref 18–48)
MCH RBC QN AUTO: 30.6 PG (ref 27–31)
MCHC RBC AUTO-ENTMCNC: 32 G/DL (ref 32–36)
MCV RBC AUTO: 96 FL (ref 82–98)
MONOCYTES # BLD AUTO: 0.9 K/UL (ref 0.3–1)
MONOCYTES NFR BLD: 12.9 % (ref 4–15)
NEUTROPHILS # BLD AUTO: 4.5 K/UL (ref 1.8–7.7)
NEUTROPHILS NFR BLD: 63.3 % (ref 38–73)
NRBC BLD-RTO: 0 /100 WBC
PLATELET # BLD AUTO: 148 K/UL (ref 150–450)
PMV BLD AUTO: 10.6 FL (ref 9.2–12.9)
RBC # BLD AUTO: 2.94 M/UL (ref 4–5.4)
VIT B12 SERPL-MCNC: 841 PG/ML (ref 210–950)
WBC # BLD AUTO: 7.15 K/UL (ref 3.9–12.7)

## 2022-02-26 PROCEDURE — 36415 COLL VENOUS BLD VENIPUNCTURE: CPT | Performed by: INTERNAL MEDICINE

## 2022-02-26 PROCEDURE — 25000003 PHARM REV CODE 250: Performed by: INTERNAL MEDICINE

## 2022-02-26 PROCEDURE — 25000003 PHARM REV CODE 250: Performed by: STUDENT IN AN ORGANIZED HEALTH CARE EDUCATION/TRAINING PROGRAM

## 2022-02-26 PROCEDURE — 99232 SBSQ HOSP IP/OBS MODERATE 35: CPT | Mod: GT,,, | Performed by: INTERNAL MEDICINE

## 2022-02-26 PROCEDURE — 99232 PR SUBSEQUENT HOSPITAL CARE,LEVL II: ICD-10-PCS | Mod: GT,,, | Performed by: INTERNAL MEDICINE

## 2022-02-26 PROCEDURE — 82607 VITAMIN B-12: CPT | Performed by: INTERNAL MEDICINE

## 2022-02-26 PROCEDURE — 85025 COMPLETE CBC W/AUTO DIFF WBC: CPT | Performed by: INTERNAL MEDICINE

## 2022-02-26 PROCEDURE — 82728 ASSAY OF FERRITIN: CPT | Performed by: INTERNAL MEDICINE

## 2022-02-26 PROCEDURE — 82746 ASSAY OF FOLIC ACID SERUM: CPT | Performed by: INTERNAL MEDICINE

## 2022-02-26 PROCEDURE — 63600175 PHARM REV CODE 636 W HCPCS: Performed by: INTERNAL MEDICINE

## 2022-02-26 PROCEDURE — 25000003 PHARM REV CODE 250: Performed by: PHYSICIAN ASSISTANT

## 2022-02-26 PROCEDURE — 11000001 HC ACUTE MED/SURG PRIVATE ROOM

## 2022-02-26 RX ORDER — FERROUS GLUCONATE 324(37.5)
324 TABLET ORAL DAILY
Status: DISCONTINUED | OUTPATIENT
Start: 2022-02-26 | End: 2022-02-27 | Stop reason: HOSPADM

## 2022-02-26 RX ADMIN — DULOXETINE 120 MG: 60 CAPSULE, DELAYED RELEASE ORAL at 09:02

## 2022-02-26 RX ADMIN — PANTOPRAZOLE SODIUM 40 MG: 40 TABLET, DELAYED RELEASE ORAL at 09:02

## 2022-02-26 RX ADMIN — ATORVASTATIN CALCIUM 40 MG: 20 TABLET, FILM COATED ORAL at 09:02

## 2022-02-26 RX ADMIN — MEDROXYPROGESTERONE ACETATE 20 MG: 10 TABLET ORAL at 09:02

## 2022-02-26 RX ADMIN — PROPAFENONE HYDROCHLORIDE 225 MG: 150 TABLET, FILM COATED ORAL at 03:02

## 2022-02-26 RX ADMIN — ACETAMINOPHEN 650 MG: 325 TABLET ORAL at 09:02

## 2022-02-26 RX ADMIN — PROPAFENONE HYDROCHLORIDE 225 MG: 150 TABLET, FILM COATED ORAL at 06:02

## 2022-02-26 RX ADMIN — APIXABAN 5 MG: 5 TABLET, FILM COATED ORAL at 09:02

## 2022-02-26 RX ADMIN — ALPRAZOLAM 0.5 MG: 0.5 TABLET ORAL at 10:02

## 2022-02-26 RX ADMIN — MEDROXYPROGESTERONE ACETATE 20 MG: 10 TABLET ORAL at 03:02

## 2022-02-26 RX ADMIN — TRAZODONE HYDROCHLORIDE 100 MG: 50 TABLET ORAL at 09:02

## 2022-02-26 RX ADMIN — ALPRAZOLAM 0.5 MG: 0.5 TABLET ORAL at 09:02

## 2022-02-26 RX ADMIN — Medication 324 MG: at 02:02

## 2022-02-26 RX ADMIN — METOPROLOL SUCCINATE 50 MG: 50 TABLET, EXTENDED RELEASE ORAL at 09:02

## 2022-02-26 RX ADMIN — ONDANSETRON 4 MG: 2 INJECTION INTRAMUSCULAR; INTRAVENOUS at 07:02

## 2022-02-26 RX ADMIN — LISINOPRIL 20 MG: 20 TABLET ORAL at 09:02

## 2022-02-26 RX ADMIN — DIPHENHYDRAMINE HYDROCHLORIDE 25 MG: 25 CAPSULE ORAL at 09:02

## 2022-02-26 RX ADMIN — PROPAFENONE HYDROCHLORIDE 225 MG: 150 TABLET, FILM COATED ORAL at 09:02

## 2022-02-26 NOTE — SUBJECTIVE & OBJECTIVE
This encounter was provided through telemedicine.  Patient was transferred to the telemedicine service on:  02/25/2022   The patient location is: 2/642 A admitted 2/18/2022  9:22 AM.  Present with the patient at the time of the telemed/virtual assessment: Telepresenter    Interval History/Overnight Events:     No further vaginal bleeding today after 2 doses of apixaban - hgb at 9 - ferritin mildly low so Iron supplement started; anticipate discharge in 24 hours if remains stable    Review of Systems   Constitutional:  Positive for fatigue. Negative for activity change and fever.   Respiratory:  Negative for cough.    Gastrointestinal:  Negative for diarrhea and vomiting.   Genitourinary:  Negative for vaginal bleeding.   Neurological:  Negative for dizziness and light-headedness.      Inpatient Medications:  Scheduled Meds:   apixaban  5 mg Oral BID    atorvastatin  40 mg Oral Daily    DULoxetine  120 mg Oral Daily    lisinopriL  20 mg Oral Daily    medroxyPROGESTERone  20 mg Oral TID    metoprolol succinate  50 mg Oral Daily    pantoprazole  40 mg Oral Daily    polyethylene glycol  17 g Oral Daily    propafenone  225 mg Oral Q8H     Continuous Infusions:  PRN Meds:.sodium chloride, acetaminophen, ALPRAZolam, carboprost, conjugated estrogens in NS 50 mL IVPB, diphenhydrAMINE, miSOPROStoL, ondansetron, sodium chloride 0.9%, traZODone      Objective:     Temp:  [97.4 °F (36.3 °C)-97.8 °F (36.6 °C)] 97.4 °F (36.3 °C)  Pulse:  [59-72] 65  Resp:  [16-18] 16  SpO2:  [95 %-99 %] 98 %  BP: (111-143)/(54-68) 143/63    No intake or output data in the 24 hours ending 02/26/22 1115       Body mass index is 49.55 kg/m².    Physical Exam  Vitals and nursing note reviewed.   Constitutional:       General: She is not in acute distress.     Appearance: Normal appearance. She is obese.   HENT:      Head: Normocephalic and atraumatic.   Eyes:      General: No scleral icterus.        Right eye: No discharge.         Left eye: No  discharge.      Extraocular Movements: Extraocular movements intact.   Cardiovascular:      Rate and Rhythm: Normal rate.   Pulmonary:      Effort: Pulmonary effort is normal. No tachypnea or respiratory distress.   Neurological:      General: No focal deficit present.      Mental Status: She is alert and oriented to person, place, and time.      Cranial Nerves: No cranial nerve deficit.      Motor: No weakness.   Psychiatric:         Mood and Affect: Mood and affect normal.         Speech: Speech normal.         Behavior: Behavior is cooperative.         Thought Content: Thought content normal.        Labs:  Recent Results (from the past 24 hour(s))   CBC auto differential    Collection Time: 02/26/22  5:43 AM   Result Value Ref Range    WBC 7.15 3.90 - 12.70 K/uL    RBC 2.94 (L) 4.00 - 5.40 M/uL    Hemoglobin 9.0 (L) 12.0 - 16.0 g/dL    Hematocrit 28.1 (L) 37.0 - 48.5 %    MCV 96 82 - 98 fL    MCH 30.6 27.0 - 31.0 pg    MCHC 32.0 32.0 - 36.0 g/dL    RDW 16.0 (H) 11.5 - 14.5 %    Platelets 148 (L) 150 - 450 K/uL    MPV 10.6 9.2 - 12.9 fL    Immature Granulocytes 1.7 (H) 0.0 - 0.5 %    Gran # (ANC) 4.5 1.8 - 7.7 K/uL    Immature Grans (Abs) 0.12 (H) 0.00 - 0.04 K/uL    Lymph # 1.3 1.0 - 4.8 K/uL    Mono # 0.9 0.3 - 1.0 K/uL    Eos # 0.3 0.0 - 0.5 K/uL    Baso # 0.05 0.00 - 0.20 K/uL    nRBC 0 0 /100 WBC    Gran % 63.3 38.0 - 73.0 %    Lymph % 17.6 (L) 18.0 - 48.0 %    Mono % 12.9 4.0 - 15.0 %    Eosinophil % 3.8 0.0 - 8.0 %    Basophil % 0.7 0.0 - 1.9 %    Differential Method Automated    Ferritin    Collection Time: 02/26/22  5:43 AM   Result Value Ref Range    Ferritin 48 20.0 - 300.0 ng/mL   Folate    Collection Time: 02/26/22  5:43 AM   Result Value Ref Range    Folate 11.1 4.0 - 24.0 ng/mL   Vitamin B12    Collection Time: 02/26/22  5:43 AM   Result Value Ref Range    Vitamin B-12 841 210 - 950 pg/mL        Lab Results   Component Value Date    [id number] Negative 02/18/2022       Recent Labs   Lab  02/24/22  0305 02/25/22  0517 02/26/22  0543   WBC 8.01 6.60 7.15   LYMPH 16.4*  1.3 21.1  1.4 17.6*  1.3   HGB 9.2* 9.6* 9.0*   HCT 28.2* 30.9* 28.1*   * 151 148*       Recent Labs   Lab 02/23/22  0302 02/24/22  0613 02/24/22  0829 02/25/22  0517    138  --  138   K 4.7 4.3  --  4.2    105  --  105   CO2 24 21*  --  24   BUN 10 9  --  11   CREATININE 1.0 0.8  --  0.8   * 108  --  105   CALCIUM 9.0 8.8  --  9.2   MG 1.8 1.9 1.7 1.7   PHOS 2.0* 2.2* 2.2* 2.8       Recent Labs   Lab 02/23/22 0302 02/24/22  0613 02/25/22  0517   ALKPHOS 146* 147* 146*   ALT 12 14 15   AST 60* 60* 55*   ALBUMIN 2.8* 2.7* 2.7*   PROT 6.2 6.1 6.2   BILITOT 0.7 1.1* 0.5          Recent Labs     02/26/22  0543   FERRITIN 48       All labs within the last 24 hours were reviewed.     Microbiology:  Microbiology Results (last 7 days)       Procedure Component Value Units Date/Time    Urine culture [[id number]] Collected: 02/18/22 1141    Order Status: Completed Specimen: Urine Updated: 02/19/22 1413     Urine Culture, Routine No significant growth    Narrative:      Specimen Source->Urine              Imaging  ECG Results              EKG 12-lead (Final result)  Result time 02/18/22 14:31:29      Final result by Interface, Lab In [facility] (02/18/22 14:31:29)                   Narrative:    Test Reason : R42,    Vent. Rate : 074 BPM     Atrial Rate : 074 BPM     P-R Int : 160 ms          QRS Dur : 086 ms      QT Int : 398 ms       P-R-T Axes : 054 040 035 degrees     QTc Int : 441 ms    Normal sinus rhythm  Normal ECG  No previous ECGs available  Confirmed by Ramy MARTÍNEZ MD ([id number]) on 2/18/2022 2:31:23 PM    Referred By: ALFIE   SELF           Confirmed By:Ramy MARTÍNEZ MD                                    Results for orders placed in visit on 06/29/21    Echo    Interpretation Summary  · The left ventricle is normal in size with concentric remodeling and normal systolic function. The estimated ejection fraction is  60%.  · Normal left ventricular diastolic function.  · Normal right ventricular size with normal right ventricular systolic function.  · Normal central venous pressure (3 mmHg).  · Aortic valve area is 1.63 cm2; peak velocity is 2.63 m/s; mean gradient is 17 mmHg.  · There is mild aortic valve stenosis.  · The estimated ejection fraction is 60%.      US Pelvis Comp with Transvag NON-OB (xpd  Narrative: EXAMINATION:  US PELVIS COMP WITH TRANSVAG NON-OB (XPD)    CLINICAL HISTORY:  post menopausal vaginal bleeding, on eliquis;    TECHNIQUE:  Transabdominal sonography of the pelvis was performed, followed by transvaginal sonography to better evaluate the uterus and ovaries.    COMPARISON:  None.    FINDINGS:  The uterus measures a proximally 11.0 x 8.6 x 6.8 cm.  The endometrial stripe is thickened measuring up to 1.7 cm.  There is heterogeneous avascular material within the lower uterine segment extending to the cervical canal suggesting blood products.  There is a partially calcified fibroid measuring approximately 2.9 x 2.3 x 3.1 cm.    The ovaries are not identified.  There is trace fluid in the pelvis.  There are a few prominent vessels within the left adnexa, correlation with any history of pelvic congestion syndrome.  Impression: This report was flagged in Epic as abnormal.    Endometrial thickening, abnormal in this reportedly postmenopausal patient.  There is heterogeneous avascular material within the lower uterine segment extending to the cervical canal concerning for blood products.  Correlation is advised, consultation with OBGYN is recommended.    Uterine leiomyoma.    Electronically signed by: Rajiv Fernando MD  Date:    02/18/2022  Time:    12:58      All imaging within the last 24 hours was reviewed.       Discharge Planning   RAYO: 2/27/2022     Code Status: Full Code   Is the patient medically ready for discharge?: No    Reason for patient still in hospital (select all that apply): Patient trending  condition, Treatment, and Consult recommendations  Discharge Plan A: Home

## 2022-02-26 NOTE — PLAN OF CARE
Patient is AAOX4. Plan of care reviewed with patient. No C/O discomfort or  bleeding noted during shift. Safety maintained throughout shift. Call light within reach will continue to monitor patient.

## 2022-02-26 NOTE — PROGRESS NOTES
[facility]  Telemedicine Progress Note    Patient Name: Vicky Alvarado  MRN: [id number]  Patient Class: IP- Inpatient   Admission Date: 2/18/2022  Length of Stay: 8 days  Attending Physician: Debra Carreon MD  Primary Care Provider: Gordy Cordero MD          Subjective:     Principal Problem:Vaginal bleeding        HPI:  Vicky Alvarado is a 56 year old female with HTN and history of atrial fibrillation s/p ablation 2 weeks ago (still on eliquis) who [place] [facility] [place] 2/18/22 complaining of vaginal bleeding for past 2 days. Patient reports she started noticed significant vaginal bleeding and pass clots on 2/16. Described some clots as the size of oranges. Reports associated abdominal cramping with vaginal bleeding. She is having to wear two large menstrual pads and changing them frequently. Standing up causes her to bleed more. Patient mentions that she has not menstruated in the last 5 years. Other associated symptoms included fatigue, dizziness, lightheadedness and NAPIER. Denies fever, chills, cough, chest pain, or sick contacts. Reports taking her last dose of eliquis on evening of 2/17 and took her antihypertensive medications this morning.      In the ED, patient found to be hypotensive with active vaginal hemorrhage. Hemoglobin at that time was 10.7.1 unit of emergency blood given. Gynecology consulted and recommending IV estrogen. Critical care medicine consulted for hemorrhagic shock.       Overview/Hospital Course:  Patient was admitted to ICU 2/18 for hemorrhagic shock due to vaginal bleeding was transfused 1 unit PRBCs.  Also started on low-dose norepinephrine infusion.  Gynecology consulted and started estrogen. Low doses of norepinephrine infusion weaned off.  Continued to have vaginal bleeding. Gync consulted s/p D&C for vaginal bleeding. She was given PRN blood products. Gyn started oral provera treatment in the interim to keep bleeding stable; 20 mg  TID. No signs of post procedure endometritis, no need for abx. Stepped down to  2/22. Post step down her Hgb drooped to 5.8 , HDS, given 2 units pRBC. Gyn following. No other source of bleed identified at this time.     Transferred to  on 1/23, transfused an additional 2u pRBCs with good recovery. GYN evaluated pt, and she was started on provera with good effect and dramatic improvement/near resolution in vaginal bleeding into 2/24.        This encounter was provided through telemedicine.  Patient was transferred to the telemedicine service on:  02/25/2022   The patient location is: 2/642 A admitted 2/18/2022  9:22 AM.  Present with the patient at the time of the telemed/virtual assessment: Telepresenter    Interval History/Overnight Events:     No further vaginal bleeding today after 2 doses of apixaban - hgb at 9 - ferritin mildly low so Iron supplement started; anticipate discharge in 24 hours if remains stable    Review of Systems   Constitutional:  Positive for fatigue. Negative for activity change and fever.   Respiratory:  Negative for cough.    Gastrointestinal:  Negative for diarrhea and vomiting.   Genitourinary:  Negative for vaginal bleeding.   Neurological:  Negative for dizziness and light-headedness.      Inpatient Medications:  Scheduled Meds:   apixaban  5 mg Oral BID    atorvastatin  40 mg Oral Daily    DULoxetine  120 mg Oral Daily    lisinopriL  20 mg Oral Daily    medroxyPROGESTERone  20 mg Oral TID    metoprolol succinate  50 mg Oral Daily    pantoprazole  40 mg Oral Daily    polyethylene glycol  17 g Oral Daily    propafenone  225 mg Oral Q8H     Continuous Infusions:  PRN Meds:.sodium chloride, acetaminophen, ALPRAZolam, carboprost, conjugated estrogens in NS 50 mL IVPB, diphenhydrAMINE, miSOPROStoL, ondansetron, sodium chloride 0.9%, traZODone      Objective:     Temp:  [97.4 °F (36.3 °C)-97.8 °F (36.6 °C)] 97.4 °F (36.3 °C)  Pulse:  [59-72] 65  Resp:  [16-18] 16  SpO2:  [95  %-99 %] 98 %  BP: (111-143)/(54-68) 143/63    No intake or output data in the 24 hours ending 02/26/22 1115       Body mass index is 49.55 kg/m².    Physical Exam  Vitals and nursing note reviewed.   Constitutional:       General: She is not in acute distress.     Appearance: Normal appearance. She is obese.   HENT:      Head: Normocephalic and atraumatic.   Eyes:      General: No scleral icterus.        Right eye: No discharge.         Left eye: No discharge.      Extraocular Movements: Extraocular movements intact.   Cardiovascular:      Rate and Rhythm: Normal rate.   Pulmonary:      Effort: Pulmonary effort is normal. No tachypnea or respiratory distress.   Neurological:      General: No focal deficit present.      Mental Status: She is alert and oriented to person, place, and time.      Cranial Nerves: No cranial nerve deficit.      Motor: No weakness.   Psychiatric:         Mood and Affect: Mood and affect normal.         Speech: Speech normal.         Behavior: Behavior is cooperative.         Thought Content: Thought content normal.        Labs:  Recent Results (from the past 24 hour(s))   CBC auto differential    Collection Time: 02/26/22  5:43 AM   Result Value Ref Range    WBC 7.15 3.90 - 12.70 K/uL    RBC 2.94 (L) 4.00 - 5.40 M/uL    Hemoglobin 9.0 (L) 12.0 - 16.0 g/dL    Hematocrit 28.1 (L) 37.0 - 48.5 %    MCV 96 82 - 98 fL    MCH 30.6 27.0 - 31.0 pg    MCHC 32.0 32.0 - 36.0 g/dL    RDW 16.0 (H) 11.5 - 14.5 %    Platelets 148 (L) 150 - 450 K/uL    MPV 10.6 9.2 - 12.9 fL    Immature Granulocytes 1.7 (H) 0.0 - 0.5 %    Gran # (ANC) 4.5 1.8 - 7.7 K/uL    Immature Grans (Abs) 0.12 (H) 0.00 - 0.04 K/uL    Lymph # 1.3 1.0 - 4.8 K/uL    Mono # 0.9 0.3 - 1.0 K/uL    Eos # 0.3 0.0 - 0.5 K/uL    Baso # 0.05 0.00 - 0.20 K/uL    nRBC 0 0 /100 WBC    Gran % 63.3 38.0 - 73.0 %    Lymph % 17.6 (L) 18.0 - 48.0 %    Mono % 12.9 4.0 - 15.0 %    Eosinophil % 3.8 0.0 - 8.0 %    Basophil % 0.7 0.0 - 1.9 %    Differential  Method Automated    Ferritin    Collection Time: 02/26/22  5:43 AM   Result Value Ref Range    Ferritin 48 20.0 - 300.0 ng/mL   Folate    Collection Time: 02/26/22  5:43 AM   Result Value Ref Range    Folate 11.1 4.0 - 24.0 ng/mL   Vitamin B12    Collection Time: 02/26/22  5:43 AM   Result Value Ref Range    Vitamin B-12 841 210 - 950 pg/mL        Lab Results   Component Value Date    [id number] Negative 02/18/2022       Recent Labs   Lab 02/24/22  0305 02/25/22  0517 02/26/22  0543   WBC 8.01 6.60 7.15   LYMPH 16.4*  1.3 21.1  1.4 17.6*  1.3   HGB 9.2* 9.6* 9.0*   HCT 28.2* 30.9* 28.1*   * 151 148*       Recent Labs   Lab 02/23/22 0302 02/24/22  0613 02/24/22  0829 02/25/22  0517    138  --  138   K 4.7 4.3  --  4.2    105  --  105   CO2 24 21*  --  24   BUN 10 9  --  11   CREATININE 1.0 0.8  --  0.8   * 108  --  105   CALCIUM 9.0 8.8  --  9.2   MG 1.8 1.9 1.7 1.7   PHOS 2.0* 2.2* 2.2* 2.8       Recent Labs   Lab 02/23/22 0302 02/24/22  0613 02/25/22  0517   ALKPHOS 146* 147* 146*   ALT 12 14 15   AST 60* 60* 55*   ALBUMIN 2.8* 2.7* 2.7*   PROT 6.2 6.1 6.2   BILITOT 0.7 1.1* 0.5          Recent Labs     02/26/22  0543   FERRITIN 48       All labs within the last 24 hours were reviewed.     Microbiology:  Microbiology Results (last 7 days)       Procedure Component Value Units Date/Time    Urine culture [[id number]] Collected: 02/18/22 1141    Order Status: Completed Specimen: Urine Updated: 02/19/22 1413     Urine Culture, Routine No significant growth    Narrative:      Specimen Source->Urine              Imaging  ECG Results              EKG 12-lead (Final result)  Result time 02/18/22 14:31:29      Final result by Interface, Lab In [facility] (02/18/22 14:31:29)                   Narrative:    Test Reason : R42,    Vent. Rate : 074 BPM     Atrial Rate : 074 BPM     P-R Int : 160 ms          QRS Dur : 086 ms      QT Int : 398 ms       P-R-T Axes : 054 040 035 degrees     QTc Int : 441  ms    Normal sinus rhythm  Normal ECG  No previous ECGs available  Confirmed by Ramy MARTÍNEZ MD ([id number]) on 2/18/2022 2:31:23 PM    Referred By: [email or web address]   SELF           Confirmed By:Ramy MARTÍNEZ MD                                    Results for orders placed in visit on 06/29/21    Echo    Interpretation Summary  · The left ventricle is normal in size with concentric remodeling and normal systolic function. The estimated ejection fraction is 60%.  · Normal left ventricular diastolic function.  · Normal right ventricular size with normal right ventricular systolic function.  · Normal central venous pressure (3 mmHg).  · Aortic valve area is 1.63 cm2; peak velocity is 2.63 m/s; mean gradient is 17 mmHg.  · There is mild aortic valve stenosis.  · The estimated ejection fraction is 60%.      US Pelvis Comp with Transvag NON-OB (xpd  Narrative: EXAMINATION:  US PELVIS COMP WITH TRANSVAG NON-OB (XPD)    CLINICAL HISTORY:  post menopausal vaginal bleeding, on eliquis;    TECHNIQUE:  Transabdominal sonography of the pelvis was performed, followed by transvaginal sonography to better evaluate the uterus and ovaries.    COMPARISON:  None.    FINDINGS:  The uterus measures a proximally 11.0 x 8.6 x 6.8 cm.  The endometrial stripe is thickened measuring up to 1.7 cm.  There is heterogeneous avascular material within the lower uterine segment extending to the cervical canal suggesting blood products.  There is a partially calcified fibroid measuring approximately 2.9 x 2.3 x 3.1 cm.    The ovaries are not identified.  There is trace fluid in the pelvis.  There are a few prominent vessels within the left adnexa, correlation with any history of pelvic congestion syndrome.  Impression: This report was flagged in Epic as abnormal.    Endometrial thickening, abnormal in this reportedly postmenopausal patient.  There is heterogeneous avascular material within the lower uterine segment extending to the cervical canal concerning for blood  products.  Correlation is advised, consultation with OBGYN is recommended.    Uterine leiomyoma.    Electronically signed by: Rajiv Fernando MD  Date:    02/18/2022  Time:    12:58      All imaging within the last 24 hours was reviewed.       Discharge Planning   RAYO: 2/27/2022     Code Status: Full Code   Is the patient medically ready for discharge?: No    Reason for patient still in hospital (select all that apply): Patient trending condition, Treatment, and Consult recommendations  Discharge Plan A: Home            Assessment/Plan:      * Vaginal bleeding  - Interval history and physical exam findings as described above  - MICU course and blood transfusion history as documented  - Evaluated by GYN, on provera with resolution of bleeding currently  - H/H stable  - eliquis resumed - monitor for bleeding  - Continuing to trend H/H, will transfuse to maintain Hb>7  - Will continue to monitor    Acute post-hemorrhagic anemia  Previously with microcytic anemia but now with elevated MCV  -repeat ferritin and also B12, folate  -transfuse for acute bleeding or hemoglobin less than 7      Thrombocytopenia  - Mild, no longer with aggressive bleeding  - Will continue to monitor; stable currently    Recurrent major depressive disorder, in partial remission  - Currently asymptomatic  - Continue home SNRI regimen    Gastroesophageal reflux disease without esophagitis  - Currently asymptomatic  - Continue home PPI regimen    Pure hypercholesterolemia  - Continue home statin regimen    Morbid obesity with BMI of 45.0-49.9, adult  - Body mass index is 49.55 kg/m².  - Pt counseled on dietary and lifestyle modifications in relation to health  - Consider dietary/nutrition consult outpatient    Atrial Fibrillation (paroxysmal)  - S/p 2 cardioversion.  She is now in sinus.  - continue propafenone  - resume metoprolol.  Increase to home dose as tolerated.  - holding anticoagulation at this time with anemia requiring transfusion;  "consulting with EP regarding preferred agent in light of recent severe bleeding; plan to monitor in hospital once anticoagulation resumed  - Will continue to monitor on tele    Essential hypertension  - Working to optimize BP control   - Continue home regimen of metoprolol and lisinopril  - Will continue to monitor and further titrate antihypertensives as clinically indicated       VTE Risk Mitigation (From admission, onward)         Ordered     apixaban tablet 5 mg  2 times daily        "Followed by" Linked Group Details    02/25/22 1755     IP VTE HIGH RISK PATIENT  Once         02/19/22 1655     Place sequential compression device  Until discontinued         02/19/22 1655     Place AL hose  Until discontinued         02/19/22 1655                I have assessed these findings virtually using a telemed platform and with assistance of the bedside nurse.          The attending portion of this evaluation, treatment, and documentation was performed per Debra Carreon MD via Telemedicine AudioVisual using the secure buildabrand software platform with 2 way audio/video. The provider was located off-site and the patient is located in the hospital. The aforementioned video software was utilized to document the relevant history and physical exam    Debra Carreon MD  Department of Hospital Medicine   [facility] - Med Surg  "

## 2022-02-26 NOTE — PLAN OF CARE
Problem: Adult Inpatient Plan of Care  Goal: Plan of Care Review  Outcome: Ongoing, Progressing  Goal: Patient-Specific Goal (Individualized)  Outcome: Ongoing, Progressing  Goal: Absence of Hospital-Acquired Illness or Injury  Outcome: Ongoing, Progressing  Goal: Optimal Comfort and Wellbeing  Outcome: Ongoing, Progressing  Goal: Readiness for Transition of Care  Outcome: Ongoing, Progressing     Problem: Bariatric Environmental Safety  Goal: Safety Maintained with Care  Outcome: Ongoing, Progressing     Problem: Diabetes Comorbidity  Goal: Blood Glucose Level Within Targeted Range  Outcome: Ongoing, Progressing     Problem: Skin Injury Risk Increased  Goal: Skin Health and Integrity  Outcome: Ongoing, Progressing     Problem: Fall Injury Risk  Goal: Absence of Fall and Fall-Related Injury  Outcome: Ongoing, Progressing     Problem: Infection  Goal: Absence of Infection Signs and Symptoms  Outcome: Ongoing, Progressing     Problem: Fatigue  Goal: Improved Activity Tolerance  Outcome: Ongoing, Progressing   Pt is A+Ox4. Pt c/o anxiety. Xanax given. Effective. Pt had tele visit from MD.  Frequent checks q 2hrs. Pt will start on Eliquis this night. Pt educated to notify nurse if vaginal bleeding reoccurs.

## 2022-02-26 NOTE — ASSESSMENT & PLAN NOTE
- Interval history and physical exam findings as described above  - MICU course and blood transfusion history as documented  - Evaluated by GYN, on provera with resolution of bleeding currently  - H/H stable  - eliquis resumed - monitor for bleeding  - Continuing to trend H/H, will transfuse to maintain Hb>7  - Will continue to monitor

## 2022-02-27 VITALS
HEART RATE: 76 BPM | RESPIRATION RATE: 17 BRPM | WEIGHT: 293 LBS | SYSTOLIC BLOOD PRESSURE: 108 MMHG | TEMPERATURE: 98 F | HEIGHT: 66 IN | OXYGEN SATURATION: 98 % | DIASTOLIC BLOOD PRESSURE: 54 MMHG | BODY MASS INDEX: 47.09 KG/M2

## 2022-02-27 LAB
ALBUMIN SERPL BCP-MCNC: 2.7 G/DL (ref 3.5–5.2)
ALP SERPL-CCNC: 126 U/L (ref 55–135)
ALT SERPL W/O P-5'-P-CCNC: 14 U/L (ref 10–44)
ANION GAP SERPL CALC-SCNC: 10 MMOL/L (ref 8–16)
ANISOCYTOSIS BLD QL SMEAR: SLIGHT
AST SERPL-CCNC: 48 U/L (ref 10–40)
BASOPHILS # BLD AUTO: 0.05 K/UL (ref 0–0.2)
BASOPHILS NFR BLD: 0.8 % (ref 0–1.9)
BILIRUB SERPL-MCNC: 0.5 MG/DL (ref 0.1–1)
BUN SERPL-MCNC: 11 MG/DL (ref 6–20)
CALCIUM SERPL-MCNC: 9 MG/DL (ref 8.7–10.5)
CHLORIDE SERPL-SCNC: 109 MMOL/L (ref 95–110)
CO2 SERPL-SCNC: 19 MMOL/L (ref 23–29)
CREAT SERPL-MCNC: 0.9 MG/DL (ref 0.5–1.4)
DIFFERENTIAL METHOD: ABNORMAL
EOSINOPHIL # BLD AUTO: 0.2 K/UL (ref 0–0.5)
EOSINOPHIL NFR BLD: 3.4 % (ref 0–8)
ERYTHROCYTE [DISTWIDTH] IN BLOOD BY AUTOMATED COUNT: 16 % (ref 11.5–14.5)
EST. GFR  (AFRICAN AMERICAN): >60 ML/MIN/1.73 M^2
EST. GFR  (NON AFRICAN AMERICAN): >60 ML/MIN/1.73 M^2
FINAL PATHOLOGIC DIAGNOSIS: NORMAL
GLUCOSE SERPL-MCNC: 161 MG/DL (ref 70–110)
HCT VFR BLD AUTO: 27.8 % (ref 37–48.5)
HGB BLD-MCNC: 9.5 G/DL (ref 12–16)
HYPOCHROMIA BLD QL SMEAR: ABNORMAL
IMM GRANULOCYTES # BLD AUTO: 0.1 K/UL (ref 0–0.04)
IMM GRANULOCYTES NFR BLD AUTO: 1.5 % (ref 0–0.5)
LYMPHOCYTES # BLD AUTO: 1.5 K/UL (ref 1–4.8)
LYMPHOCYTES NFR BLD: 22.3 % (ref 18–48)
Lab: NORMAL
MCH RBC QN AUTO: 31.5 PG (ref 27–31)
MCHC RBC AUTO-ENTMCNC: 34.2 G/DL (ref 32–36)
MCV RBC AUTO: 92 FL (ref 82–98)
MONOCYTES # BLD AUTO: 0.7 K/UL (ref 0.3–1)
MONOCYTES NFR BLD: 11 % (ref 4–15)
NEUTROPHILS # BLD AUTO: 4 K/UL (ref 1.8–7.7)
NEUTROPHILS NFR BLD: 61 % (ref 38–73)
NRBC BLD-RTO: 0 /100 WBC
PLATELET # BLD AUTO: 165 K/UL (ref 150–450)
PLATELET BLD QL SMEAR: ABNORMAL
PMV BLD AUTO: 11.4 FL (ref 9.2–12.9)
POLYCHROMASIA BLD QL SMEAR: ABNORMAL
POTASSIUM SERPL-SCNC: 4.3 MMOL/L (ref 3.5–5.1)
PROT SERPL-MCNC: 6.2 G/DL (ref 6–8.4)
RBC # BLD AUTO: 3.02 M/UL (ref 4–5.4)
SODIUM SERPL-SCNC: 138 MMOL/L (ref 136–145)
WBC # BLD AUTO: 6.54 K/UL (ref 3.9–12.7)

## 2022-02-27 PROCEDURE — 25000003 PHARM REV CODE 250: Performed by: STUDENT IN AN ORGANIZED HEALTH CARE EDUCATION/TRAINING PROGRAM

## 2022-02-27 PROCEDURE — 25000003 PHARM REV CODE 250: Performed by: CLINICAL NURSE SPECIALIST

## 2022-02-27 PROCEDURE — 85025 COMPLETE CBC W/AUTO DIFF WBC: CPT | Performed by: INTERNAL MEDICINE

## 2022-02-27 PROCEDURE — 80053 COMPREHEN METABOLIC PANEL: CPT | Performed by: INTERNAL MEDICINE

## 2022-02-27 PROCEDURE — 25000003 PHARM REV CODE 250: Performed by: INTERNAL MEDICINE

## 2022-02-27 PROCEDURE — 99239 PR HOSPITAL DISCHARGE DAY,>30 MIN: ICD-10-PCS | Mod: GT,,, | Performed by: INTERNAL MEDICINE

## 2022-02-27 PROCEDURE — 99239 HOSP IP/OBS DSCHRG MGMT >30: CPT | Mod: GT,,, | Performed by: INTERNAL MEDICINE

## 2022-02-27 PROCEDURE — 94761 N-INVAS EAR/PLS OXIMETRY MLT: CPT

## 2022-02-27 PROCEDURE — 25000003 PHARM REV CODE 250: Performed by: PHYSICIAN ASSISTANT

## 2022-02-27 PROCEDURE — 36415 COLL VENOUS BLD VENIPUNCTURE: CPT | Performed by: INTERNAL MEDICINE

## 2022-02-27 RX ORDER — NIFEDIPINE 90 MG/1
90 TABLET, EXTENDED RELEASE ORAL DAILY
Qty: 30 TABLET | Refills: 11 | Status: SHIPPED | OUTPATIENT
Start: 2022-02-27 | End: 2023-03-28 | Stop reason: SDUPTHER

## 2022-02-27 RX ORDER — FUROSEMIDE 40 MG/1
40 TABLET ORAL 2 TIMES DAILY
Qty: 60 TABLET | Refills: 11 | Status: SHIPPED | OUTPATIENT
Start: 2022-02-27 | End: 2023-03-28 | Stop reason: SDUPTHER

## 2022-02-27 RX ORDER — LISINOPRIL 40 MG/1
20 TABLET ORAL DAILY
Qty: 45 TABLET | Refills: 3
Start: 2022-02-27 | End: 2022-06-13

## 2022-02-27 RX ORDER — POTASSIUM CHLORIDE 20 MEQ/1
20 TABLET, EXTENDED RELEASE ORAL DAILY
Qty: 30 TABLET | Refills: 11 | Status: SHIPPED | OUTPATIENT
Start: 2022-02-27 | End: 2023-12-14 | Stop reason: SDUPTHER

## 2022-02-27 RX ORDER — MEDROXYPROGESTERONE ACETATE 10 MG/1
20 TABLET ORAL 3 TIMES DAILY
Qty: 180 TABLET | Refills: 2 | Status: SHIPPED | OUTPATIENT
Start: 2022-02-27 | End: 2022-05-05

## 2022-02-27 RX ADMIN — DULOXETINE 120 MG: 60 CAPSULE, DELAYED RELEASE ORAL at 09:02

## 2022-02-27 RX ADMIN — MEDROXYPROGESTERONE ACETATE 20 MG: 10 TABLET ORAL at 09:02

## 2022-02-27 RX ADMIN — Medication 324 MG: at 09:02

## 2022-02-27 RX ADMIN — ATORVASTATIN CALCIUM 40 MG: 20 TABLET, FILM COATED ORAL at 09:02

## 2022-02-27 RX ADMIN — MISOPROSTOL 800 MCG: 200 TABLET ORAL at 09:02

## 2022-02-27 RX ADMIN — LISINOPRIL 20 MG: 20 TABLET ORAL at 09:02

## 2022-02-27 RX ADMIN — METOPROLOL SUCCINATE 50 MG: 50 TABLET, EXTENDED RELEASE ORAL at 09:02

## 2022-02-27 RX ADMIN — ALPRAZOLAM 0.5 MG: 0.5 TABLET ORAL at 09:02

## 2022-02-27 RX ADMIN — PANTOPRAZOLE SODIUM 40 MG: 40 TABLET, DELAYED RELEASE ORAL at 09:02

## 2022-02-27 RX ADMIN — PROPAFENONE HYDROCHLORIDE 225 MG: 150 TABLET, FILM COATED ORAL at 05:02

## 2022-02-27 RX ADMIN — APIXABAN 5 MG: 5 TABLET, FILM COATED ORAL at 09:02

## 2022-02-27 NOTE — PLAN OF CARE
POC reviewed with patient, no questions at this time. No acute events. VSS on room air, afebrile. Electrolytes repleted as needed. Ambulates ind to the bathroom. Pt stable enough for safe discharge home with no services. Meds delivered to bedside. Discharge education and medication instructions discussed with patient, no further questions at this time. R midline discontinued per policy,minimal bleeding noted.Calls appropriately for assistance.Will continue to monitor with frequent rounds and clustered care to promote rest.

## 2022-02-27 NOTE — PLAN OF CARE
Problem: Adult Inpatient Plan of Care  Goal: Plan of Care Review  Outcome: Ongoing, Progressing  Goal: Patient-Specific Goal (Individualized)  Outcome: Ongoing, Progressing  Goal: Absence of Hospital-Acquired Illness or Injury  Outcome: Ongoing, Progressing  Goal: Optimal Comfort and Wellbeing  Outcome: Ongoing, Progressing  Goal: Readiness for Transition of Care  Outcome: Ongoing, Progressing     Problem: Bariatric Environmental Safety  Goal: Safety Maintained with Care  Outcome: Ongoing, Progressing     Problem: Diabetes Comorbidity  Goal: Blood Glucose Level Within Targeted Range  Outcome: Ongoing, Progressing     Problem: Skin Injury Risk Increased  Goal: Skin Health and Integrity  Outcome: Ongoing, Progressing     Problem: Fall Injury Risk  Goal: Absence of Fall and Fall-Related Injury  Outcome: Ongoing, Progressing     Problem: Infection  Goal: Absence of Infection Signs and Symptoms  Outcome: Ongoing, Progressing     Problem: Fatigue  Goal: Improved Activity Tolerance  Outcome: Ongoing, Progressing  Pt is A+Ox4. C/O of anxiousness. Xanax given. Effective. No vaginal bleeding this shift. Frequent checks q2 hrs. No S&S of distress.

## 2022-02-27 NOTE — PLAN OF CARE
Patient is AAOX4. Zofran given upon patient request for nausea. No C/O discomfort or  bleeding noted during shift. Sister at bedside.Safety maintained throughout shift will continue to monitor.

## 2022-02-27 NOTE — PLAN OF CARE
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  [facility] [address] [place] [id number] [phone] after hours  [phone] fax Vicky Alvarado has been hospitalized at the [facility] since 2/18/2022.  Please excuse the patient from duties.  Patient may resume home employment duties on 3/2/2022.  She may resume on-site and home employment duties as previously restricted on 3/7/2022.  Please contact me if you have any questions.                  __________e-sig________________  Debra Carreon MD  02/27/2022

## 2022-02-28 NOTE — PLAN OF CARE
[facility] - Med Surg  Discharge Final Note    Primary Care Provider: Gordy Cordero MD    Expected Discharge Date: 2/27/2022       Future Appointments   Date Time Provider Department Center   3/7/2022  3:45 PM Gordy Cordero MD [facility]   3/16/2022  1:00 PM Pipo Holland MD [facility] OBGYN [place] [facility]   4/26/2022  1:40 PM Gabriel Hawley MD [facility] ARR[facility]TH [facility]   4/26/2022  3:40 PM Sarah Hernandez MD [facility] DERM [facility]   5/23/2022  8:20 AM Jonathan Montalvo OD [facility] OPTOMTY [facility]           Final Discharge Note (most recent)     Final Note - 02/28/22 0753        Final Note    Assessment Type Final Discharge Note     Anticipated Discharge Disposition Home or Self Care     What phone number can be called within the next 1-3 days to see how you are doing after discharge? [phone]     Hospital Resources/Appts/Education Provided Appointments scheduled and added to AVS        Post-Acute Status    Post-Acute Authorization Other     Other Status No Post-Acute Service Needs                 Important Message from Medicare          Liat Rodriguez, WILBERT  Ext [phone]

## 2022-03-04 ENCOUNTER — PATIENT MESSAGE (OUTPATIENT)
Dept: ORTHOPEDICS | Facility: CLINIC | Age: 57
End: 2022-03-04
Payer: COMMERCIAL

## 2022-03-04 ENCOUNTER — PATIENT MESSAGE (OUTPATIENT)
Dept: ELECTROPHYSIOLOGY | Facility: CLINIC | Age: 57
End: 2022-03-04
Payer: COMMERCIAL

## 2022-03-04 DIAGNOSIS — M25.532 LEFT WRIST PAIN: Primary | ICD-10-CM

## 2022-03-05 NOTE — DISCHARGE SUMMARY
[facility]  Discharge Summary      Patient Name: Vicky Alvarado  MRN: [id number]  Patient Class: IP- Inpatient  Admission Date: 2/18/2022  Hospital Length of Stay: 9 days  Discharge Date and Time: 2/27/2022  1:19 PM  Attending Physician: No att. providers found   Discharging Provider: Debra Carreon MD  Primary Care Provider: Gordy Cordero MD      HPI:   Vicky Alvarado is a 56 year old female with HTN and history of atrial fibrillation s/p ablation 2 weeks ago (still on eliquis) who presented to [facility] ED on 2/18/22 complaining of vaginal bleeding for past 2 days. Patient reports she started noticed significant vaginal bleeding and pass clots on 2/16. Described some clots as the size of oranges. Reports associated abdominal cramping with vaginal bleeding. She is having to wear two large menstrual pads and changing them frequently. Standing up causes her to bleed more. Patient mentions that she has not menstruated in the last 5 years. Other associated symptoms included fatigue, dizziness, lightheadedness and NAPIER. Denies fever, chills, cough, chest pain, or sick contacts. Reports taking her last dose of eliquis on evening of 2/17 and took her antihypertensive medications this morning.      In the ED, patient found to be hypotensive with active vaginal hemorrhage. Hemoglobin at that time was 10.7.1 unit of emergency blood given. Gynecology consulted and recommending IV estrogen. Critical care medicine consulted for hemorrhagic shock.       Procedure(s) (LRB):  HYSTEROSCOPY, WITH DILATION AND CURETTAGE OF UTERUS (N/A)      Hospital Course:   Patient was admitted to ICU 2/18 for hemorrhagic shock due to vaginal bleeding was transfused 1 unit PRBCs.  Also started on low-dose norepinephrine infusion.  Gynecology consulted and started estrogen. Low doses of norepinephrine infusion weaned off.  Continued to have vaginal bleeding. Gync consulted s/p D&C for vaginal bleeding. She was given  PRN blood products. Gyn started oral provera treatment in the interim to keep bleeding stable; 20 mg TID. No signs of post procedure endometritis, no need for abx. Stepped down to  2/22. Post step down her Hgb drooped to 5.8 , HDS, given 2 units pRBC. Gyn following. No other source of bleed identified at this time.     Transferred to  on 1/23, transfused an additional 2u pRBCs with good recovery. GYN evaluated pt, and she was started on provera with good effect and dramatic improvement/near resolution in vaginal bleeding into 2/24.  EP cardiology consulted and recommended resuming apixaban vs. alternative medication.  Patient tolerating apixaban with stable Hgb at discharge.       Goals of Care Treatment Preferences:  Code Status: Full Code      Consults:   Consults (From admission, onward)        Status Ordering Provider     Inpatient consult to Electrophysiology  Once        Provider:  (Not yet assigned)    Completed ITZEL FERRELL     Inpatient virtual consult to Hospital Medicine  Once        Provider:  (Not yet assigned)    Completed IMELDA PETTIT     Inpatient consult to Gynecology  Once        Provider:  (Not yet assigned)    Completed SANDRA GARCIA     Inpatient consult to Midline team  Once        Provider:  (Not yet assigned)    Completed BATOOL CANO     Inpatient consult to Midline team  Once        Provider:  (Not yet assigned)    Completed MARQUISE MIX     Inpatient consult to Gynecology  Once        Provider:  (Not yet assigned)    Completed CLAIRE ROSE     Inpatient consult to Critical Care Medicine  Once        Provider:  (Not yet assigned)    Completed CLAIRE ROSE          * Vaginal bleeding  - Interval history and physical exam findings as described above  - MICU course and blood transfusion history as documented  - Evaluated by GYN, on provera with resolution of bleeding currently  - H/H stable  - eliquis resumed - monitor for bleeding  - Continuing to trend H/H, will  transfuse to maintain Hb>7  - Will continue to monitor    Acute post-hemorrhagic anemia  Previously with microcytic anemia but now with elevated MCV  -repeat ferritin and also B12, folate  -transfuse for acute bleeding or hemoglobin less than 7      Thrombocytopenia  - Mild, no longer with aggressive bleeding  - Will continue to monitor; stable currently    Recurrent major depressive disorder, in partial remission  - Currently asymptomatic  - Continue home SNRI regimen    Gastroesophageal reflux disease without esophagitis  - Currently asymptomatic  - Continue home PPI regimen    Pure hypercholesterolemia  - Continue home statin regimen    Morbid obesity with BMI of 45.0-49.9, adult  - Body mass index is 49.55 kg/m².  - Pt counseled on dietary and lifestyle modifications in relation to health  - Consider dietary/nutrition consult outpatient    Atrial Fibrillation (paroxysmal)  - S/p 2 cardioversion.  She is now in sinus.  - continue propafenone  - resume metoprolol.  Increase to home dose as tolerated.  - holding anticoagulation at this time with anemia requiring transfusion; consulting with EP regarding preferred agent in light of recent severe bleeding; apixaban recommended; monitored in hospital once anticoagulation resumed  - Will continue to monitor on tele    Essential hypertension  - Working to optimize BP control   - Continue home regimen of metoprolol and lisinopril  - Will continue to monitor and further titrate antihypertensives as clinically indicated       Final Active Diagnoses:    Diagnosis Date Noted POA    PRINCIPAL PROBLEM:  Vaginal bleeding [N93.9] 02/18/2022 Yes    Acute post-hemorrhagic anemia [D62] 02/25/2022 Yes    Gastroesophageal reflux disease without esophagitis [K21.9] 02/24/2022 Yes    Recurrent major depressive disorder, in partial remission [F33.41] 02/24/2022 Yes    Thrombocytopenia [D69.6] 02/24/2022 Yes    Pure hypercholesterolemia [E78.00] 06/16/2021 Yes    Atrial  Fibrillation (paroxysmal) [I48.0] 02/26/2020 Yes    Morbid obesity with BMI of 45.0-49.9, adult [E66.01, Z68.42] 10/12/2015 Not Applicable    Essential hypertension [I10] 10/12/2015 Yes      Problems Resolved During this Admission:    Diagnosis Date Noted Date Resolved POA    Hemorrhagic shock [R57.8] 02/18/2022 02/24/2022 Yes    JHONATAN (acute kidney injury) [N17.9] 02/18/2022 02/24/2022 Yes       Discharged Condition: stable    Disposition: Home or Self Care    Follow Up:    Patient Instructions:      Diet Cardiac     Notify your health care provider if you experience any of the following:  temperature >100.4     Notify your health care provider if you experience any of the following:  persistent nausea and vomiting or diarrhea     Notify your health care provider if you experience any of the following:  severe uncontrolled pain     Notify your health care provider if you experience any of the following:  difficulty breathing or increased cough     Notify your health care provider if you experience any of the following:  severe persistent headache     Notify your health care provider if you experience any of the following:  worsening rash     Notify your health care provider if you experience any of the following:  persistent dizziness, light-headedness, or visual disturbances     Notify your health care provider if you experience any of the following:  increased confusion or weakness     Activity as tolerated       Significant Diagnostic Studies: as above    Pending Diagnostic Studies:     None         Medications:  Reconciled Home Medications:      Medication List      START taking these medications    medroxyPROGESTERone 10 MG tablet  Commonly known as: PROVERA  Take 2 tablets (20 mg total) by mouth 3 (three) times daily.        CHANGE how you take these medications    ferrous sulfate 142 mg (45 mg iron) Tbsr  Commonly known as: SLOW RELEASE IRON  Take 1 tablet (142 mg total) by mouth once daily. FOR 7 DAY THEN  INCREASE TO TWICE DAILY AS TOLERATED  What changed: additional instructions     furosemide 40 MG tablet  Commonly known as: LASIX  Take 1 tablet (40 mg total) by mouth 2 (two) times daily. Resume as needed for edema until followup with your PCP.  What changed: additional instructions     lisinopriL 40 MG tablet  Commonly known as: PRINIVIL,ZESTRIL  Take 0.5 tablets (20 mg total) by mouth once daily.  What changed: how much to take     NIFEdipine 90 MG (OSM) 24 hr tablet  Commonly known as: PROCARDIA-XL  Take 1 tablet (90 mg total) by mouth once daily. HOLD UNTIL FOLLOW-UP WITH YOUR PCP.  What changed: additional instructions     potassium chloride SA 20 MEQ tablet  Commonly known as: K-DUR,KLOR-CON  Take 1 tablet (20 mEq total) by mouth once daily. ONLY TAKE WITH LASIX.  What changed: additional instructions        CONTINUE taking these medications    acetaminophen 500 MG tablet  Commonly known as: TYLENOL  Take 2 tablets (1,000 mg) by mouth at onset of pain or headache, may repeat if needed.     ALPRAZolam 0.5 MG tablet  Commonly known as: XANAX  Take 1 tablet (0.5 mg total) by mouth 2 (two) times daily as needed for Anxiety.     atorvastatin 40 MG tablet  Commonly known as: LIPITOR  Take 1 tablet (40 mg total) by mouth once daily.     b complex vitamins capsule  Take 1 capsule by mouth once daily.     colchicine 0.6 mg tablet  Commonly known as: COLCRYS  For gout attack: Take TWO tablets by mouth, then, 2 hours later, take ONE additional tablet. Then take 1 tablet twice daily only if still needed for gout pain.     DULoxetine 60 MG capsule  Commonly known as: CYMBALTA  Take 2 capsules (120 mg total) by mouth once daily.     ELIQUIS 5 mg Tab  Generic drug: apixaban  Take 1 tablet (5 mg total) by mouth 2 (two) times daily.     sdyaldco-umrt-gvl2-C-radha-bosw 750 mg-644 mg- 30 mg-1 mg Tab  Take 1 tablet by mouth once daily.     MEGARED OMEGA-3 KRILL OIL ORAL  Take 1 capsule by mouth once daily.     melatonin 10 mg  Tab  Take 1-2 tablets by mouth nightly as needed (Sleep).     metoprolol succinate 50 MG 24 hr tablet  Commonly known as: TOPROL-XL  Take 1 tablet (50 mg total) by mouth 2 (two) times daily.     MIRALAX 17 gram Pwpk  Generic drug: polyethylene glycol  Take by mouth as needed (constipation).     multivitamin per tablet  Commonly known as: THERAGRAN  Take 1 tablet by mouth once daily.     omeprazole 20 MG capsule  Commonly known as: PRILOSEC  TAKE 1 CAPSULE BY MOUTH ONCE DAILY     propafenone 225 MG Tab  Commonly known as: RYTHMOL  Take 1 tablet (225 mg total) by mouth every 8 (eight) hours.     traZODone 100 MG tablet  Commonly known as: DESYREL  Take 1 tablet (100 mg total) by mouth nightly as needed for Insomnia.     UNABLE TO FIND  Take 1 tablet by mouth once daily. Trino's Leg Cramps        STOP taking these medications    pantoprazole 40 MG tablet  Commonly known as: PROTONIX     predniSONE 10 MG tablet  Commonly known as: DELTASONE            Indwelling Lines/Drains at time of discharge:   Lines/Drains/Airways     None                 Time spent on the discharge of patient: 37 minutes         The attending portion of this evaluation, treatment, and documentation was performed per Debra Carreon MD via Telemedicine AudioVisual using the secure Vid[employer] software platform with 2 way audio/video. The provider was located off-site and the patient is located in the hospital. The aforementioned video software was utilized to document the relevant history and physical exam    Debra Carreon MD  Department of Hospital Medicine  [facility]

## 2022-03-05 NOTE — ASSESSMENT & PLAN NOTE
- S/p 2 cardioversion.  She is now in sinus.  - continue propafenone  - resume metoprolol.  Increase to home dose as tolerated.  - holding anticoagulation at this time with anemia requiring transfusion; consulting with EP regarding preferred agent in light of recent severe bleeding; apixaban recommended; monitored in hospital once anticoagulation resumed  - Will continue to monitor on tele

## 2022-03-07 ENCOUNTER — TELEPHONE (OUTPATIENT)
Dept: ELECTROPHYSIOLOGY | Facility: CLINIC | Age: 57
End: 2022-03-07
Payer: COMMERCIAL

## 2022-03-12 ENCOUNTER — PATIENT MESSAGE (OUTPATIENT)
Dept: OBSTETRICS AND GYNECOLOGY | Facility: CLINIC | Age: 57
End: 2022-03-12
Payer: COMMERCIAL

## 2022-03-16 ENCOUNTER — OFFICE VISIT (OUTPATIENT)
Dept: OBSTETRICS AND GYNECOLOGY | Facility: CLINIC | Age: 57
End: 2022-03-16
Payer: COMMERCIAL

## 2022-03-16 VITALS
BODY MASS INDEX: 47.09 KG/M2 | SYSTOLIC BLOOD PRESSURE: 100 MMHG | WEIGHT: 293 LBS | DIASTOLIC BLOOD PRESSURE: 80 MMHG | HEIGHT: 66 IN

## 2022-03-16 DIAGNOSIS — N95.0 POSTMENOPAUSAL BLEEDING: Primary | ICD-10-CM

## 2022-03-16 DIAGNOSIS — Z98.890 S/P D&C (STATUS POST DILATION AND CURETTAGE): ICD-10-CM

## 2022-03-16 PROCEDURE — 4010F ACE/ARB THERAPY RXD/TAKEN: CPT | Mod: CPTII,S$GLB,, | Performed by: OBSTETRICS & GYNECOLOGY

## 2022-03-16 PROCEDURE — 99999 PR PBB SHADOW E&M-EST. PATIENT-LVL IV: ICD-10-PCS | Mod: PBBFAC,,, | Performed by: OBSTETRICS & GYNECOLOGY

## 2022-03-16 PROCEDURE — 1159F MED LIST DOCD IN RCRD: CPT | Mod: CPTII,S$GLB,, | Performed by: OBSTETRICS & GYNECOLOGY

## 2022-03-16 PROCEDURE — 3044F HG A1C LEVEL LT 7.0%: CPT | Mod: CPTII,S$GLB,, | Performed by: OBSTETRICS & GYNECOLOGY

## 2022-03-16 PROCEDURE — 4010F PR ACE/ARB THEARPY RXD/TAKEN: ICD-10-PCS | Mod: CPTII,S$GLB,, | Performed by: OBSTETRICS & GYNECOLOGY

## 2022-03-16 PROCEDURE — 1159F PR MEDICATION LIST DOCUMENTED IN MEDICAL RECORD: ICD-10-PCS | Mod: CPTII,S$GLB,, | Performed by: OBSTETRICS & GYNECOLOGY

## 2022-03-16 PROCEDURE — 3044F PR MOST RECENT HEMOGLOBIN A1C LEVEL <7.0%: ICD-10-PCS | Mod: CPTII,S$GLB,, | Performed by: OBSTETRICS & GYNECOLOGY

## 2022-03-16 PROCEDURE — 3008F PR BODY MASS INDEX (BMI) DOCUMENTED: ICD-10-PCS | Mod: CPTII,S$GLB,, | Performed by: OBSTETRICS & GYNECOLOGY

## 2022-03-16 PROCEDURE — 3074F PR MOST RECENT SYSTOLIC BLOOD PRESSURE < 130 MM HG: ICD-10-PCS | Mod: CPTII,S$GLB,, | Performed by: OBSTETRICS & GYNECOLOGY

## 2022-03-16 PROCEDURE — 1160F PR REVIEW ALL MEDS BY PRESCRIBER/CLIN PHARMACIST DOCUMENTED: ICD-10-PCS | Mod: CPTII,S$GLB,, | Performed by: OBSTETRICS & GYNECOLOGY

## 2022-03-16 PROCEDURE — 1160F RVW MEDS BY RX/DR IN RCRD: CPT | Mod: CPTII,S$GLB,, | Performed by: OBSTETRICS & GYNECOLOGY

## 2022-03-16 PROCEDURE — 3079F DIAST BP 80-89 MM HG: CPT | Mod: CPTII,S$GLB,, | Performed by: OBSTETRICS & GYNECOLOGY

## 2022-03-16 PROCEDURE — 99999 PR PBB SHADOW E&M-EST. PATIENT-LVL IV: CPT | Mod: PBBFAC,,, | Performed by: OBSTETRICS & GYNECOLOGY

## 2022-03-16 PROCEDURE — 3079F PR MOST RECENT DIASTOLIC BLOOD PRESSURE 80-89 MM HG: ICD-10-PCS | Mod: CPTII,S$GLB,, | Performed by: OBSTETRICS & GYNECOLOGY

## 2022-03-16 PROCEDURE — 99024 PR POST-OP FOLLOW-UP VISIT: ICD-10-PCS | Mod: S$GLB,,, | Performed by: OBSTETRICS & GYNECOLOGY

## 2022-03-16 PROCEDURE — 3008F BODY MASS INDEX DOCD: CPT | Mod: CPTII,S$GLB,, | Performed by: OBSTETRICS & GYNECOLOGY

## 2022-03-16 PROCEDURE — 3074F SYST BP LT 130 MM HG: CPT | Mod: CPTII,S$GLB,, | Performed by: OBSTETRICS & GYNECOLOGY

## 2022-03-16 PROCEDURE — 99024 POSTOP FOLLOW-UP VISIT: CPT | Mod: S$GLB,,, | Performed by: OBSTETRICS & GYNECOLOGY

## 2022-03-16 NOTE — PROGRESS NOTES
"  Reason for visit: Post-op Evaluation      HPI:    56 y.o. postmenopausal female who presents for a post operative visit. [facility] and noted to have significant vaginal bleeding in setting of anticoagulation post cardiac ablation. Patient was treated with estrogen without improvement and subsequently underwent D&C 2/19.  Pathology from D&C was benign. She continued to have bleeding following D&C and was started on provera. She received multiple blood transfusions while inpatient. She reports doing well post op. Her bleeding has subsided. She reported isolated temps at home up to 101, however denies any fevers this past week. Denies abdominal pain, foul smelling lochia, purulent vaginal discharge, nausea, or vomiting.      Reviewed:    Pap: No pap on record  Mammogram: scheduled    Past medical, surgical, social, family, and obstetric history: Reviewed and updated in EMR  Medications: Reviewed and updated in EMR.  Allergies: Flecainide    Prior records and results: Reviewed  Outside records: Available records reviewed      Vitals: /80   Ht 5' 6" (1.676 m)   Wt 135.7 kg (299 lb 2.6 oz)   LMP 06/08/2015   BMI 48.29 kg/m²     Physical Exam  Constitutional:       General: She is not in acute distress.     Appearance: Normal appearance. She is well-developed. She is not toxic-appearing.   Genitourinary:      Vulva normal.      Right Labia: No rash, lesions or Bartholin's cyst.     Left Labia: No lesions, Bartholin's cyst or rash.     No vaginal discharge or bleeding.      No cervical discharge or lesion.      Pelvic exam was performed with patient in the lithotomy position.   Pulmonary:      Effort: No respiratory distress.   Abdominal:      General: There is no distension.      Palpations: Abdomen is soft. There is no mass.      Tenderness: There is no abdominal tenderness. There is no guarding or rebound.   Exam conducted with a chaperone present.           Assessment & " Plan:    Postmenopausal bleeding    S/P D&C (status post dilation and curettage)         Recovering well. Denies persistent bleeding. Endometrial pathology benign   Will taper provera from 20mg BID to 20mg daily for a week before stopping provera   No evidence of endometritis despite isolated temps at home. Normal physical exam   Patient anticipates being able to come off anticoagulation in the near future after her ablation   Bleeding and infection precautions given    Follow up as soon as possible for well woman exam    Kacy Martinez MD PGY-2  Obstetrics and Gynecology

## 2022-03-25 ENCOUNTER — HOSPITAL ENCOUNTER (EMERGENCY)
Facility: HOSPITAL | Age: 57
Discharge: HOME | End: 2022-03-25
Attending: EMERGENCY MEDICINE
Payer: COMMERCIAL

## 2022-03-25 ENCOUNTER — PATIENT MESSAGE (OUTPATIENT)
Dept: OBSTETRICS AND GYNECOLOGY | Facility: CLINIC | Age: 57
End: 2022-03-25
Payer: COMMERCIAL

## 2022-03-25 VITALS
DIASTOLIC BLOOD PRESSURE: 84 MMHG | RESPIRATION RATE: 20 BRPM | OXYGEN SATURATION: 95 % | HEART RATE: 66 BPM | TEMPERATURE: 98 F | SYSTOLIC BLOOD PRESSURE: 129 MMHG | WEIGHT: 293 LBS | BODY MASS INDEX: 48.04 KG/M2

## 2022-03-25 DIAGNOSIS — N93.9 VAGINAL BLEEDING: Primary | ICD-10-CM

## 2022-03-25 LAB
ALBUMIN SERPL BCP-MCNC: 3.4 G/DL (ref 3.5–5.2)
ALP SERPL-CCNC: 106 U/L (ref 55–135)
ALT SERPL W/O P-5'-P-CCNC: 14 U/L (ref 10–44)
ANION GAP SERPL CALC-SCNC: 10 MMOL/L (ref 8–16)
AST SERPL-CCNC: 43 U/L (ref 10–40)
BACTERIA #/AREA URNS AUTO: ABNORMAL /HPF
BASOPHILS # BLD AUTO: 0.04 K/UL (ref 0–0.2)
BASOPHILS NFR BLD: 0.7 % (ref 0–1.9)
BILIRUB SERPL-MCNC: 0.9 MG/DL (ref 0.1–1)
BILIRUB UR QL STRIP: NEGATIVE
BUN SERPL-MCNC: 8 MG/DL (ref 6–20)
CALCIUM SERPL-MCNC: 9.4 MG/DL (ref 8.7–10.5)
CHLORIDE SERPL-SCNC: 99 MMOL/L (ref 95–110)
CLARITY UR REFRACT.AUTO: ABNORMAL
CO2 SERPL-SCNC: 24 MMOL/L (ref 23–29)
COLOR UR AUTO: ABNORMAL
CREAT SERPL-MCNC: 1 MG/DL (ref 0.5–1.4)
DIFFERENTIAL METHOD: ABNORMAL
EOSINOPHIL # BLD AUTO: 0.1 K/UL (ref 0–0.5)
EOSINOPHIL NFR BLD: 2.3 % (ref 0–8)
ERYTHROCYTE [DISTWIDTH] IN BLOOD BY AUTOMATED COUNT: 15.9 % (ref 11.5–14.5)
EST. GFR  (AFRICAN AMERICAN): >60 ML/MIN/1.73 M^2
EST. GFR  (NON AFRICAN AMERICAN): >60 ML/MIN/1.73 M^2
GLUCOSE SERPL-MCNC: 103 MG/DL (ref 70–110)
GLUCOSE UR QL STRIP: NEGATIVE
HCT VFR BLD AUTO: 34 % (ref 37–48.5)
HGB BLD-MCNC: 10.7 G/DL (ref 12–16)
HGB UR QL STRIP: ABNORMAL
HYALINE CASTS UR QL AUTO: 0 /LPF
IMM GRANULOCYTES # BLD AUTO: 0.02 K/UL (ref 0–0.04)
IMM GRANULOCYTES NFR BLD AUTO: 0.3 % (ref 0–0.5)
KETONES UR QL STRIP: ABNORMAL
LEUKOCYTE ESTERASE UR QL STRIP: ABNORMAL
LYMPHOCYTES # BLD AUTO: 1.1 K/UL (ref 1–4.8)
LYMPHOCYTES NFR BLD: 17.5 % (ref 18–48)
MCH RBC QN AUTO: 29.2 PG (ref 27–31)
MCHC RBC AUTO-ENTMCNC: 31.5 G/DL (ref 32–36)
MCV RBC AUTO: 93 FL (ref 82–98)
MICROSCOPIC COMMENT: ABNORMAL
MONOCYTES # BLD AUTO: 0.7 K/UL (ref 0.3–1)
MONOCYTES NFR BLD: 10.8 % (ref 4–15)
NEUTROPHILS # BLD AUTO: 4.1 K/UL (ref 1.8–7.7)
NEUTROPHILS NFR BLD: 68.4 % (ref 38–73)
NITRITE UR QL STRIP: NEGATIVE
NRBC BLD-RTO: 0 /100 WBC
PH UR STRIP: 6 [PH] (ref 5–8)
PLATELET # BLD AUTO: 171 K/UL (ref 150–450)
PMV BLD AUTO: 11.1 FL (ref 9.2–12.9)
POTASSIUM SERPL-SCNC: 3.7 MMOL/L (ref 3.5–5.1)
PROT SERPL-MCNC: 7.4 G/DL (ref 6–8.4)
PROT UR QL STRIP: ABNORMAL
RBC # BLD AUTO: 3.66 M/UL (ref 4–5.4)
RBC #/AREA URNS AUTO: >100 /HPF (ref 0–4)
SODIUM SERPL-SCNC: 133 MMOL/L (ref 136–145)
SP GR UR STRIP: 1.01 (ref 1–1.03)
URN SPEC COLLECT METH UR: ABNORMAL
WBC # BLD AUTO: 6 K/UL (ref 3.9–12.7)
WBC #/AREA URNS AUTO: 0 /HPF (ref 0–5)

## 2022-03-25 PROCEDURE — 96375 TX/PRO/DX INJ NEW DRUG ADDON: CPT

## 2022-03-25 PROCEDURE — 80053 COMPREHEN METABOLIC PANEL: CPT | Performed by: STUDENT IN AN ORGANIZED HEALTH CARE EDUCATION/TRAINING PROGRAM

## 2022-03-25 PROCEDURE — 85025 COMPLETE CBC W/AUTO DIFF WBC: CPT | Performed by: STUDENT IN AN ORGANIZED HEALTH CARE EDUCATION/TRAINING PROGRAM

## 2022-03-25 PROCEDURE — 99284 PR EMERGENCY DEPT VISIT,LEVEL IV: ICD-10-PCS | Mod: ,,, | Performed by: EMERGENCY MEDICINE

## 2022-03-25 PROCEDURE — 96374 THER/PROPH/DIAG INJ IV PUSH: CPT

## 2022-03-25 PROCEDURE — 81001 URINALYSIS AUTO W/SCOPE: CPT | Performed by: STUDENT IN AN ORGANIZED HEALTH CARE EDUCATION/TRAINING PROGRAM

## 2022-03-25 PROCEDURE — 99284 EMERGENCY DEPT VISIT MOD MDM: CPT | Mod: ,,, | Performed by: EMERGENCY MEDICINE

## 2022-03-25 PROCEDURE — 96361 HYDRATE IV INFUSION ADD-ON: CPT

## 2022-03-25 PROCEDURE — 63600175 PHARM REV CODE 636 W HCPCS: Performed by: STUDENT IN AN ORGANIZED HEALTH CARE EDUCATION/TRAINING PROGRAM

## 2022-03-25 PROCEDURE — 99284 EMERGENCY DEPT VISIT MOD MDM: CPT | Mod: 25

## 2022-03-25 RX ORDER — ONDANSETRON 2 MG/ML
4 INJECTION INTRAMUSCULAR; INTRAVENOUS
Status: COMPLETED | OUTPATIENT
Start: 2022-03-25 | End: 2022-03-25

## 2022-03-25 RX ORDER — ONDANSETRON 4 MG/1
8 TABLET, ORALLY DISINTEGRATING ORAL EVERY 8 HOURS PRN
Qty: 8 TABLET | Refills: 0 | Status: SHIPPED | OUTPATIENT
Start: 2022-03-25 | End: 2022-08-05 | Stop reason: CLARIF

## 2022-03-25 RX ORDER — MORPHINE SULFATE 4 MG/ML
4 INJECTION, SOLUTION INTRAMUSCULAR; INTRAVENOUS
Status: COMPLETED | OUTPATIENT
Start: 2022-03-25 | End: 2022-03-25

## 2022-03-25 RX ADMIN — MORPHINE SULFATE 4 MG: 4 INJECTION INTRAVENOUS at 07:03

## 2022-03-25 RX ADMIN — ONDANSETRON 4 MG: 2 INJECTION INTRAMUSCULAR; INTRAVENOUS at 07:03

## 2022-03-25 RX ADMIN — SODIUM CHLORIDE, SODIUM LACTATE, POTASSIUM CHLORIDE, AND CALCIUM CHLORIDE 1000 ML: .6; .31; .03; .02 INJECTION, SOLUTION INTRAVENOUS at 07:03

## 2022-03-25 NOTE — ED PROVIDER NOTES
"Encounter Date: 3/25/2022       History     Chief Complaint   Patient presents with    Vaginal Bleeding     Pt c/o of vaginal bleeding and dizziness that started yesterday. Pt states she is passing small clots. Pt was recently hospitalized for post menopausal bleeding/hemorrhagic shock. +bloodthinners (eliquis). Pt denies any recent trauma to abdomen.      56-year-old female with past medical history hypertension, A-fib s/p ablation x2 on Eliquis, fibroids presenting to ED with complaint of vaginal bleeding. Per chart review, patient was admitted to [facility] on 2/18 for significant vaginal bleeding in setting of anticoagulation post cardiac ablation. She was treated with estrogen without improvement and subsequently underwent D&C 2/19.  Pathology from D&C was benign. She continued to have bleeding following D&C and was started on provera. She received multiple blood transfusions while inpatient.  Since going home she has been weaning Provera.  She was seen on 03/16 by gynecology and had reported isolated fevers up to 101 prior to that visit.  She states she has not had any fever since.  She has been continuing to wean her Provera.  She weaned to 1 pill daily on 3/21.  Since then, she has started to have vaginal spotting which has been slowly increasing.  She reports passage of half dollar sized blood clots.  She has also had associated nausea, lower abdominal cramps with "pressure at the rectum" and lightheadedness for the past 2 days.  She denies syncope or head injury.  She took her Eliquis last night but has not taken it this morning.  She reports she has been eating and drinking.        Review of patient's allergies indicates:   Allergen Reactions    Flecainide Shortness Of Breath and Swelling     Past Medical History:   Diagnosis Date    Anticoagulant long-term use     Arthritis     Atrial fibrillation     cardioversion    Atrial fibrillation with RVR     Avascular necrosis     L hand    CHF (congestive " heart failure)     Depression     Encounter for blood transfusion 07/22/2020    GERD (gastroesophageal reflux disease)     Hx of psychiatric care     Hypertension     Obese     Psychiatric problem     Sleep apnea      Past Surgical History:   Procedure Laterality Date    ABLATION OF ARRHYTHMOGENIC FOCUS FOR ATRIAL FIBRILLATION N/A 7/20/2020    Procedure: Ablation atrial fibrillation;  Surgeon: Gabriel Hawley MD;  Location: [facility] EP LAB;  Service: Cardiology;  Laterality: N/A;  afib, PVI, RFA, REENA, SHADY, anes, MB, 3 Prep    ABLATION OF ARRHYTHMOGENIC FOCUS FOR ATRIAL FIBRILLATION N/A 1/24/2022    Procedure: Ablation atrial fibrillation;  Surgeon: Gabriel Hawley MD;  Location: [facility] EP LAB;  Service: Cardiology;  Laterality: N/A;  afib/afl, PVI (re-do)/CTI, RFA, REENA (cx if SR), SHADY, anes, MB, 3 Prep    APPLICATION OF WOUND VACUUM-ASSISTED CLOSURE DEVICE Left 8/3/2020    Procedure: APPLICATION, WOUND VAC;  Surgeon: SEMAJ Márquez III, MD;  Location: [facility] OR 2ND FLR;  Service: Peripheral Vascular;  Laterality: Left;    APPLICATION OF WOUND VACUUM-ASSISTED CLOSURE DEVICE Left 8/6/2020    Procedure: APPLICATION, WOUND VAC;  Surgeon: SEMAJ Márquez III, MD;  Location: [facility] OR 2ND FLR;  Service: Peripheral Vascular;  Laterality: Left;    CARPAL TUNNEL RELEASE Right 6/10/2020    Procedure: RELEASE, CARPAL TUNNEL - RIGHT;  Surgeon: Adelaida Hall MD;  Location: [facility];  Service: Orthopedics;  Laterality: Right;  GENERAL AND REGIONAL    CARPAL TUNNEL RELEASE Left 5/5/2021    Procedure: RELEASE, CARPAL TUNNEL, LEFT;  Surgeon: Adelaida Hall MD;  Location: [facility] OR;  Service: Orthopedics;  Laterality: Left;  GENERAL & REGIONAL IN PACU    CLOSURE OF WOUND Left 8/6/2020    Procedure: CLOSURE, WOUND;  Surgeon: SEMAJ Márquez III, MD;  Location: [facility] OR 2ND FLR;  Service: Peripheral Vascular;  Laterality: Left;  Complex    COLONOSCOPY N/A 8/17/2021    Procedure: COLONOSCOPY Suprep;  Surgeon:  Loco Deluca MD;  Location: [facility];  Service: Endoscopy;  Laterality: N/A;    ESOPHAGOGASTRODUODENOSCOPY N/A 8/17/2021    Procedure: EGD (ESOPHAGOGASTRODUODENOSCOPY);  Surgeon: Loco Deluca MD;  Location: [facility] ENDO;  Service: Endoscopy;  Laterality: N/A;  Patient is schedule to have her Covid test on 08/14/2021 at the [facility] @ 9:30am. [place].    EXPLORATION OF FEMORAL ARTERY Left 7/21/2020    Procedure: EXPLORATION, ARTERY, FEMORAL;  Surgeon: SEMAJ Márquez III, MD;  Location: [facility] OR [address] [place] [address];  Service: Peripheral Vascular;  Laterality: Left;  1. Urgent Direct repair, L SFA branch laceration    FOOT SURGERY      HYSTEROSCOPY WITH DILATION AND CURETTAGE OF UTERUS N/A 2/19/2022    Procedure: HYSTEROSCOPY, WITH DILATION AND CURETTAGE OF UTERUS;  Surgeon: Shane Palomo MD;  Location: [facility] OR [address] [place] [address];  Service: OB/GYN;  Laterality: N/A;    TREATMENT OF CARDIAC ARRHYTHMIA N/A 1/29/2020    Procedure: CARDIOVERSION;  Surgeon: Gabriel Hawley MD;  Location: [facility] EP LAB;  Service: Cardiology;  Laterality: N/A;  af, demi, dccv, destiny ibarra, 345    TREATMENT OF CARDIAC ARRHYTHMIA  1/24/2022    Procedure: Cardioversion or Defibrillation;  Surgeon: Gabriel Hawley MD;  Location: [facility] EP LAB;  Service: Cardiology;;    WOUND DEBRIDEMENT Left 8/6/2020    Procedure: DEBRIDEMENT, WOUND;  Surgeon: SEMAJ Márquez III, MD;  Location: [facility] OR [address] [place] [address];  Service: Peripheral Vascular;  Laterality: Left;     Family History   Problem Relation Age of Onset    Cataracts Mother     Hypertension Mother     Thyroid disease Mother     Diabetes Father     Hypertension Father     Hypertension Sister     Hypertension Brother     Amblyopia Neg Hx     Blindness Neg Hx     Cancer Neg Hx     Glaucoma Neg Hx     Macular degeneration Neg Hx     Retinal detachment Neg Hx     Strabismus Neg Hx     Stroke Neg Hx      Social History     Tobacco Use    Smoking status: Former Smoker     Types: Cigarettes      Quit date: 2019     Years since quittin.7    Smokeless tobacco: Never Used   Substance Use Topics    Alcohol use: No    Drug use: No     Review of Systems   Constitutional: Negative for activity change and fever.   HENT: Negative for congestion and rhinorrhea.    Eyes: Negative for pain and visual disturbance.   Respiratory: Negative for cough and shortness of breath.    Cardiovascular: Negative for chest pain and palpitations.   Gastrointestinal: Positive for abdominal pain and nausea. Negative for blood in stool and vomiting.   Genitourinary: Positive for decreased urine volume and vaginal bleeding. Negative for dysuria.   Musculoskeletal: Negative for gait problem and joint swelling.   Skin: Negative for color change and pallor.   Neurological: Positive for light-headedness. Negative for syncope.       Physical Exam     Initial Vitals [22 0625]   BP Pulse Resp Temp SpO2   (!) 174/90 72 16 97.9 °F (36.6 °C) 100 %      MAP       --         Physical Exam    Nursing note and vitals reviewed.  Constitutional: She is not diaphoretic.   Anxious appearing female sitting in bed   HENT:   Head: Normocephalic and atraumatic.   Mouth/Throat: Oropharynx is clear and moist.   Eyes: Conjunctivae and EOM are normal.   Neck: Neck supple.   Normal range of motion.  Cardiovascular: Normal rate, regular rhythm, normal heart sounds and intact distal pulses.   Pulmonary/Chest: Breath sounds normal. She has no wheezes.   Abdominal: Abdomen is soft. She exhibits no distension. There is no abdominal tenderness.   Genitourinary:    Genitourinary Comments: small amount of blood in vaginal vault without clots     Musculoskeletal:         General: No edema. Normal range of motion.      Cervical back: Normal range of motion and neck supple.     Neurological: She is alert and oriented to person, place, and time. She has normal strength. No sensory deficit.   Skin: Skin is warm and dry. Capillary refill takes less than 2  seconds.         ED Course   Procedures  Labs Reviewed   CBC W/ AUTO DIFFERENTIAL - Abnormal; Notable for the following components:       Result Value    RBC 3.66 (*)     Hemoglobin 10.7 (*)     Hematocrit 34.0 (*)     MCHC 31.5 (*)     RDW 15.9 (*)     Lymph % 17.5 (*)     All other components within normal limits   COMPREHENSIVE METABOLIC PANEL - Abnormal; Notable for the following components:    Sodium 133 (*)     Albumin 3.4 (*)     AST 43 (*)     All other components within normal limits   URINALYSIS, REFLEX TO URINE CULTURE - Abnormal; Notable for the following components:    Color, UA Red (*)     Appearance, UA Cloudy (*)     Protein, UA 2+ (*)     Ketones, UA Trace (*)     Occult Blood UA 2+ (*)     Leukocytes, UA Trace (*)     All other components within normal limits    Narrative:     Specimen Source->Urine   URINALYSIS MICROSCOPIC - Abnormal; Notable for the following components:    RBC, UA >100 (*)     All other components within normal limits    Narrative:     Specimen Source->Urine   TYPE & SCREEN          Imaging Results    None          Medications   lactated ringers bolus 1,000 mL (0 mLs Intravenous Stopped 3/25/22 0800)   morphine injection 4 mg (4 mg Intravenous Given 3/25/22 0738)   ondansetron injection 4 mg (4 mg Intravenous Given 3/25/22 0733)     Medical Decision Making:   History:   Old Medical Records: I decided to obtain old medical records.  Initial Assessment:   56-year-old female with past medical history hypertension, A-fib s/p ablation x2 on Eliquis, fibroids presenting to ED with complaint of vaginal bleeding.   Differential Diagnosis:   Acute hemoglobin anemia  Fibroids  DIC  Clinical Tests:   Lab Tests: Ordered and Reviewed  ED Management:  Patient is hemodynamically stable without pallor. She is given morphine and zofran for symptom management. She received 1L IVF bolus. She is neurologically intact. Pelvic exam showed small amount of blood in vaginal vault without clots. Hgb  improved from prior to 10.7. CMP is within normal limits. Patient discussed with Gynecology service who recommend patient increase progesterone to BID and have close clinic follow-up. This was discussed with patient who is in agreement with plan. Patient discharged home with return precautions. All questions answered.                       Clinical Impression:   Final diagnoses:  [N93.9] Vaginal bleeding (Primary)          ED Disposition Condition    Discharge Stable        ED Prescriptions     Medication Sig Dispense Start Date End Date Auth. Provider    ondansetron (ZOFRAN-ODT) 4 MG TbDL Take 2 tablets (8 mg total) by mouth every 8 (eight) hours as needed (nasuea, vomiting). 8 tablet 3/25/2022  Deanne Bojorquez MD        Follow-up Information     Follow up With Specialties Details Why Contact Info    Pipo Holland MD Obstetrics, Obstetrics and Gynecology Schedule an appointment as soon as possible for a visit   [address] [place] [id number]  [phone]             Deanne Bojorquez MD  Resident  03/25/22 [id number]

## 2022-03-25 NOTE — DISCHARGE INSTRUCTIONS
Diagnosis:   1. Vaginal bleeding        Tests you had showed:   Labs Reviewed   CBC W/ AUTO DIFFERENTIAL - Abnormal; Notable for the following components:       Result Value    RBC 3.66 (*)     Hemoglobin 10.7 (*)     Hematocrit 34.0 (*)     MCHC 31.5 (*)     RDW 15.9 (*)     Lymph % 17.5 (*)     All other components within normal limits   COMPREHENSIVE METABOLIC PANEL - Abnormal; Notable for the following components:    Sodium 133 (*)     Albumin 3.4 (*)     AST 43 (*)     All other components within normal limits   URINALYSIS, REFLEX TO URINE CULTURE - Abnormal; Notable for the following components:    Color, UA Red (*)     Appearance, UA Cloudy (*)     Protein, UA 2+ (*)     Ketones, UA Trace (*)     Occult Blood UA 2+ (*)     Leukocytes, UA Trace (*)     All other components within normal limits    Narrative:     Specimen Source->Urine   URINALYSIS MICROSCOPIC - Abnormal; Notable for the following components:    RBC, UA >100 (*)     All other components within normal limits    Narrative:     Specimen Source->Urine   TYPE & SCREEN      No orders to display       Treatments you received were:   Medications   lactated ringers bolus 1,000 mL (1,000 mLs Intravenous New Bag 3/25/22 0700)   morphine injection 4 mg (4 mg Intravenous Given 3/25/22 0738)   ondansetron injection 4 mg (4 mg Intravenous Given 3/25/22 0733)       Home Care Instructions:  - Medications:  Increase Provera to 1 pill twice daily. Take Zofran as needed for nausea.    Follow-Up Plan:  - Follow-up with: Gynecologist within 3 days  - Additional testing and/or evaluation will be directed by your primary doctor    Return to the Emergency Department for symptoms including but not limited to: worsening symptoms, severe back pain, shortness of breath or chest pain, vomiting with inability to hold down fluids, blood in vomit or poop, fevers greater than 100.4°F, passing out/fainting/unconsciousness, or other concerning symptoms.

## 2022-03-25 NOTE — ED NOTES
"Pt presents with c/o vaginal bleeding. Reports quarter sized clots and an occasional bloody "gush". Also c/o rectal pressure, decreased u/o and lower abdominal cramping.  "

## 2022-04-05 ENCOUNTER — PATIENT MESSAGE (OUTPATIENT)
Dept: OBSTETRICS AND GYNECOLOGY | Facility: CLINIC | Age: 57
End: 2022-04-05
Payer: COMMERCIAL

## 2022-04-06 ENCOUNTER — PATIENT MESSAGE (OUTPATIENT)
Dept: OBSTETRICS AND GYNECOLOGY | Facility: CLINIC | Age: 57
End: 2022-04-06
Payer: COMMERCIAL

## 2022-04-07 ENCOUNTER — PATIENT MESSAGE (OUTPATIENT)
Dept: OBSTETRICS AND GYNECOLOGY | Facility: CLINIC | Age: 57
End: 2022-04-07
Payer: COMMERCIAL

## 2022-04-08 ENCOUNTER — PATIENT MESSAGE (OUTPATIENT)
Dept: OBSTETRICS AND GYNECOLOGY | Facility: CLINIC | Age: 57
End: 2022-04-08
Payer: COMMERCIAL

## 2022-04-08 ENCOUNTER — HOSPITAL ENCOUNTER (OUTPATIENT)
Dept: RADIOLOGY | Facility: HOSPITAL | Age: 57
Discharge: HOME | End: 2022-04-08
Attending: INTERNAL MEDICINE
Payer: COMMERCIAL

## 2022-04-08 VITALS — BODY MASS INDEX: 47.09 KG/M2 | WEIGHT: 293 LBS | HEIGHT: 66 IN

## 2022-04-08 DIAGNOSIS — Z12.31 BREAST CANCER SCREENING BY MAMMOGRAM: ICD-10-CM

## 2022-04-08 PROCEDURE — 77067 SCR MAMMO BI INCL CAD: CPT | Mod: 26,,, | Performed by: RADIOLOGY

## 2022-04-08 PROCEDURE — 77063 BREAST TOMOSYNTHESIS BI: CPT | Mod: TC

## 2022-04-08 PROCEDURE — 77063 MAMMO DIGITAL SCREENING BILAT WITH TOMO: ICD-10-PCS | Mod: 26,,, | Performed by: RADIOLOGY

## 2022-04-08 PROCEDURE — 77067 SCR MAMMO BI INCL CAD: CPT | Mod: TC

## 2022-04-08 PROCEDURE — 77067 MAMMO DIGITAL SCREENING BILAT WITH TOMO: ICD-10-PCS | Mod: 26,,, | Performed by: RADIOLOGY

## 2022-04-08 PROCEDURE — 77063 BREAST TOMOSYNTHESIS BI: CPT | Mod: 26,,, | Performed by: RADIOLOGY

## 2022-04-14 ENCOUNTER — IMMUNIZATION (OUTPATIENT)
Dept: INTERNAL MEDICINE | Facility: CLINIC | Age: 57
End: 2022-04-14
Payer: COMMERCIAL

## 2022-04-14 ENCOUNTER — PATIENT MESSAGE (OUTPATIENT)
Dept: ORTHOPEDICS | Facility: CLINIC | Age: 57
End: 2022-04-14
Payer: COMMERCIAL

## 2022-04-14 DIAGNOSIS — Z23 NEED FOR VACCINATION: Primary | ICD-10-CM

## 2022-04-14 PROCEDURE — 0054A COVID-19, MRNA, LNP-S, PF, 30 MCG/0.3 ML DOSE VACCINE (PFIZER): CPT | Mod: CV19,PBBFAC | Performed by: INTERNAL MEDICINE

## 2022-04-22 ENCOUNTER — TELEPHONE (OUTPATIENT)
Dept: ELECTROPHYSIOLOGY | Facility: CLINIC | Age: 57
End: 2022-04-22
Payer: COMMERCIAL

## 2022-04-22 DIAGNOSIS — I48.91 ATRIAL FIBRILLATION, UNSPECIFIED TYPE: Primary | ICD-10-CM

## 2022-04-25 ENCOUNTER — HOSPITAL ENCOUNTER (OUTPATIENT)
Dept: CARDIOLOGY | Facility: CLINIC | Age: 57
Discharge: HOME | End: 2022-04-25
Payer: COMMERCIAL

## 2022-04-25 DIAGNOSIS — I48.91 ATRIAL FIBRILLATION, UNSPECIFIED TYPE: ICD-10-CM

## 2022-04-25 PROCEDURE — 93010 RHYTHM STRIP: ICD-10-PCS | Mod: S$GLB,,, | Performed by: INTERNAL MEDICINE

## 2022-04-25 PROCEDURE — 93010 ELECTROCARDIOGRAM REPORT: CPT | Mod: S$GLB,,, | Performed by: INTERNAL MEDICINE

## 2022-04-25 PROCEDURE — 93005 RHYTHM STRIP: ICD-10-PCS | Mod: S$GLB,,, | Performed by: INTERNAL MEDICINE

## 2022-04-25 PROCEDURE — 93005 ELECTROCARDIOGRAM TRACING: CPT | Mod: S$GLB,,, | Performed by: INTERNAL MEDICINE

## 2022-04-26 ENCOUNTER — OFFICE VISIT (OUTPATIENT)
Dept: ELECTROPHYSIOLOGY | Facility: CLINIC | Age: 57
End: 2022-04-26
Payer: COMMERCIAL

## 2022-04-26 DIAGNOSIS — I48.0 PAF (PAROXYSMAL ATRIAL FIBRILLATION): Primary | ICD-10-CM

## 2022-04-26 PROCEDURE — 1160F RVW MEDS BY RX/DR IN RCRD: CPT | Mod: CPTII,95,, | Performed by: INTERNAL MEDICINE

## 2022-04-26 PROCEDURE — 99214 OFFICE O/P EST MOD 30 MIN: CPT | Mod: 95,,, | Performed by: INTERNAL MEDICINE

## 2022-04-26 PROCEDURE — 1159F MED LIST DOCD IN RCRD: CPT | Mod: CPTII,95,, | Performed by: INTERNAL MEDICINE

## 2022-04-26 PROCEDURE — 4010F PR ACE/ARB THEARPY RXD/TAKEN: ICD-10-PCS | Mod: CPTII,95,, | Performed by: INTERNAL MEDICINE

## 2022-04-26 PROCEDURE — 4010F ACE/ARB THERAPY RXD/TAKEN: CPT | Mod: CPTII,95,, | Performed by: INTERNAL MEDICINE

## 2022-04-26 PROCEDURE — 3044F PR MOST RECENT HEMOGLOBIN A1C LEVEL <7.0%: ICD-10-PCS | Mod: CPTII,95,, | Performed by: INTERNAL MEDICINE

## 2022-04-26 PROCEDURE — 1160F PR REVIEW ALL MEDS BY PRESCRIBER/CLIN PHARMACIST DOCUMENTED: ICD-10-PCS | Mod: CPTII,95,, | Performed by: INTERNAL MEDICINE

## 2022-04-26 PROCEDURE — 99214 PR OFFICE/OUTPT VISIT, EST, LEVL IV, 30-39 MIN: ICD-10-PCS | Mod: 95,,, | Performed by: INTERNAL MEDICINE

## 2022-04-26 PROCEDURE — 1159F PR MEDICATION LIST DOCUMENTED IN MEDICAL RECORD: ICD-10-PCS | Mod: CPTII,95,, | Performed by: INTERNAL MEDICINE

## 2022-04-26 PROCEDURE — 3044F HG A1C LEVEL LT 7.0%: CPT | Mod: CPTII,95,, | Performed by: INTERNAL MEDICINE

## 2022-04-26 NOTE — PROGRESS NOTES
Subjective:    Patient ID:  Vicky Alvarado is a 56 y.o. female who presents for follow-up of Atrial Fibrillation      56 yoF here for AF management.     7/7/2020: 1/9/20, she had her upper teeth pulled for dentures and been on penicillin since that time. She was in SR. She takes atenolol 10 mg q.d. for hypertension. She underwent REENA/CV 1/29/2020. EF 40% in AF. PET Stress 3/2020 revealed no ischemia. Repeat echo in NSR showed EF 60%. She has had recurrence, once during carpal tunnel surgery, and several other times since.  Regarding her family history, her mother and brother both have atrial fibrillation. She is on elquis for CVA prophylaxis. CHADSVASC of at least 3. No bleeding issues with xarelto.      10/2020: PVI 7/20/2020. 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 She underwent emergent exploratory surgery by Dr Rose who ligated a branch of her left SFA. She had further wound issues requiring wash out and wound vac over the next few months. This has been a challenging recovery but she is nearing completion. No symptomatic or documented AF recurrence.     3/21: AF/RVR recurrence with ER visit. Flecainide 100 mg bid started 3/1/21. No recurrence since. Some PVCs    6/21: Her leg incision has healed. She has resumed work. Over the past month, she has developed more NAPIER.     9/21/21: HTN meds adjusted. Lasix started, hctz stopped, nifedipine started. Labs showed anemia. Endoscopy with no lesions. Respiratory evaluation performed without obvious culprit. LE edema worsened. She stopped flecainide with resolution of symptoms.  She has had some palpitations.     12/21: rythmol started for pAF. She feels that her AF has returned recently with some long sustained events. In SR today. She feels that the AF is compromising her QOL and asks about repeat ablation.     Interval history: Repeat ablation 1/22 with rare and brief, longest 1h. She has since developed  bleeding and is due  for hysterectomy later this spring. Symptoms much improved.     Ablation 1/22:  · Empiric CTI ablation.  · Pulmonary vein re-isolation of Right PVs, Left PVs isolated at baseline  · Posterior wall isolation.  · Induction of clockwise mitral annular flutter  · Successful lateral mitral isthmus line with demonstration of bidirectional block  · Catheter ablation of two mechanistically distinct tachycardias.  · 3D mapping performed with Ensite.  · Intracardiac echo  · Complex case due to multiple tachycardias and lines of block achieved    holter 7/21 10% PVCs    Echo 6/30/21:  · The left ventricle is normal in size with concentric remodeling and normal systolic function. The estimated ejection fraction is 60%.  · Normal left ventricular diastolic function.  · Normal right ventricular size with normal right ventricular systolic function.  · Normal central venous pressure (3 mmHg).  · Aortic valve area is 1.63 cm2; peak velocity is 2.63 m/s; mean gradient is 17 mmHg.  · There is mild aortic valve stenosis.  · The estimated ejection fraction is 60%.    TTE 5/2020:  · Normal left ventricular systolic function. The estimated ejection fraction is 60%.  · Severe left atrial enlargement.  · Grade I (mild) left ventricular diastolic dysfunction consistent with impaired relaxation.  · Normal right ventricular systolic function.  · Mild right atrial enlargement.  · Mild aortic valve stenosis. Aortic valve area is 1.83 cm2; peak velocity is 2.42 m/s; mean gradient is 12 mmHg.  · Mild tricuspid regurgitation.  · Normal central venous pressure (3 mmHg).  · The estimated PA systolic pressure is 22 mmHg.     PET Stress 3/2020:  The relative PET images are normal showing no clinically significant regional resting or stress induced perfusion defects.    Whole heart absolute myocardial perfusion (cc/min/g) averaged 0.47 cc/min/g at rest (which is reduced), 0.93 cc/min/g at stress (which is moderately reduced), and 2.03 CFR (which is  mildly reduced).    Gated perfusion images showed an ejection fraction of 78% at rest and 72% during stress. Normal ejection fraction is greater than 51%.    Wall motion was normal at rest and during stress.    LV cavity size is normal at rest and stress.    The EKG portion of this study is negative for ischemia.    There were no arrhythmias during stress.    The patient reported no chest pain during the stress test.    There are no prior studies for comparison.        TTE (January 2020):   · Atrial fibrillation observed.  · Left ventricular systolic function. The estimated ejection fraction is 40%.  · Local segmental wall motion abnormalities.  · Moderate left atrial enlargement.  · Normal right ventricular systolic function.  · Moderate right atrial enlargement.  · Possibly mild aortic valve stenosis ( reduced LENCHO but valve opening appears near normal).  · Aortic valve area is 1.67 cm2; peak velocity is 1.77 m/s; mean gradient is 8 mmHg.  · Mild mitral regurgitation.  · Mild tricuspid regurgitation.  · Normal central venous pressure (3 mmHg).  · The estimated PA systolic pressure is 29 mmHg.    Past Medical History:  No date: Anticoagulant long-term use  No date: Arthritis  No date: Atrial fibrillation      Comment:  cardioversion  No date: Atrial fibrillation with RVR  No date: Avascular necrosis      Comment:  L hand  No date: CHF (congestive heart failure)  No date: Depression  07/22/2020: Encounter for blood transfusion  No date: GERD (gastroesophageal reflux disease)  No date: Hx of psychiatric care  No date: Hypertension  No date: Obese  No date: Psychiatric problem  No date: Sleep apnea    Past Surgical History:  7/20/2020: ABLATION OF ARRHYTHMOGENIC FOCUS FOR ATRIAL FIBRILLATION;   N/A      Comment:  Procedure: Ablation atrial fibrillation;  Surgeon:                Gabriel Hawley MD;  Location: [facility] EP LAB;  Service:               Cardiology;  Laterality: N/A;  afib, PVI, RFA, REENA, SHADY,                 fred, MB, 3 Prep  1/24/2022: ABLATION OF ARRHYTHMOGENIC FOCUS FOR ATRIAL FIBRILLATION;   N/A      Comment:  Procedure: Ablation atrial fibrillation;  Surgeon:                Gabriel Hawley MD;  Location: [facility] EP LAB;  Service:               Cardiology;  Laterality: N/A;  afib/afl, PVI (re-do)/CTI,               RFA, REENA (cx if SR), SHADY, fred, MB, 3 Prep  8/3/2020: APPLICATION OF WOUND VACUUM-ASSISTED CLOSURE DEVICE; Left      Comment:  Procedure: APPLICATION, WOUND VAC;  Surgeon: SEMAJ Márquez III, MD;  Location: [facility] OR [facility] FLR;  Service:               Peripheral Vascular;  Laterality: Left;  8/6/2020: APPLICATION OF WOUND VACUUM-ASSISTED CLOSURE DEVICE; Left      Comment:  Procedure: APPLICATION, WOUND VAC;  Surgeon: SEMAJ Márquez III, MD;  Location: [facility] OR 2ND FLR;  Service:               Peripheral Vascular;  Laterality: Left;  6/10/2020: CARPAL TUNNEL RELEASE; Right      Comment:  Procedure: RELEASE, CARPAL TUNNEL - RIGHT;  Surgeon:                Adelaida Hall MD;  Location: [facility];  Service:                Orthopedics;  Laterality: Right;  GENERAL AND REGIONAL  5/5/2021: CARPAL TUNNEL RELEASE; Left      Comment:  Procedure: RELEASE, CARPAL TUNNEL, LEFT;  Surgeon:                Adelaida Hall MD;  Location: [facility];  Service:                Orthopedics;  Laterality: Left;  GENERAL & REGIONAL IN                PACU  8/6/2020: CLOSURE OF WOUND; Left      Comment:  Procedure: CLOSURE, WOUND;  Surgeon: SEMAJ Márquez III, MD;  Location: [facility] OR [facility] FLR;  Service: Peripheral               Vascular;  Laterality: Left;  Complex  8/17/2021: COLONOSCOPY; N/A      Comment:  Procedure: COLONOSCOPY Suprep;  Surgeon: Loco Deluca MD;  Location: [facility];  Service: Endoscopy;                Laterality: N/A;  8/17/2021: ESOPHAGOGASTRODUODENOSCOPY; N/A      Comment:  Procedure: EGD (ESOPHAGOGASTRODUODENOSCOPY);   Surgeon:                Loco Deluca MD;  Location: [facility] ENDO;                 Service: Endoscopy;  Laterality: N/A;  Patient is                schedule to have her Covid test on 2021 at the                [facility] @ 9:30am. [place].  2020: EXPLORATION OF FEMORAL ARTERY; Left      Comment:  Procedure: EXPLORATION, ARTERY, FEMORAL;  Surgeon: SEMAJ Márquez III, MD;  Location: [facility] OR [address] [place] [address];  Service:               Peripheral Vascular;  Laterality: Left;  1. Urgent Direct               repair, L SFA branch laceration  No date: FOOT SURGERY  2022: HYSTEROSCOPY WITH DILATION AND CURETTAGE OF UTERUS; N/A      Comment:  Procedure: HYSTEROSCOPY, WITH DILATION AND CURETTAGE OF                UTERUS;  Surgeon: Shane Palomo MD;  [facility];  Service: OB/GYN;  Laterality: N/A;  2020: TREATMENT OF CARDIAC ARRHYTHMIA; N/A      Comment:  Procedure: CARDIOVERSION;  Surgeon: Gabriel Hawley MD;  Location: [facility] EP LAB;  Service: Cardiology;                 Laterality: N/A;  af, demi, dccv, anes, mb, 345  2022: TREATMENT OF CARDIAC ARRHYTHMIA      Comment:  Procedure: Cardioversion or Defibrillation;  Surgeon:                Gabriel Hawley MD;  Location: [facility] EP LAB;  Service:               Cardiology;;  2020: WOUND DEBRIDEMENT; Left      Comment:  Procedure: DEBRIDEMENT, WOUND;  Surgeon: SEMAJ Márquez III, MD;  [facility];  Service: Peripheral               Vascular;  Laterality: Left;    Social History    Socioeconomic History      Marital status: Single    Tobacco Use      Smoking status: Former Smoker        Types: Cigarettes        Quit date: 2019        Years since quittin.8      Smokeless tobacco: Never Used    Substance and Sexual Activity      Alcohol use: No      Drug use: No      Review of patient's family history indicates:  Problem: Cataracts      Relation: Mother           Age of Onset: (Not Specified)  Problem: Hypertension      Relation: Mother          Age of Onset: (Not Specified)  Problem: Thyroid disease      Relation: Mother          Age of Onset: (Not Specified)  Problem: Diabetes      Relation: Father          Age of Onset: (Not Specified)  Problem: Hypertension      Relation: Father          Age of Onset: (Not Specified)  Problem: Hypertension      Relation: Sister          Age of Onset: (Not Specified)  Problem: Hypertension      Relation: Brother          Age of Onset: (Not Specified)  Problem: Amblyopia      Relation: Neg Hx          Age of Onset: (Not Specified)  Problem: Blindness      Relation: Neg Hx          Age of Onset: (Not Specified)  Problem: Cancer      Relation: Neg Hx          Age of Onset: (Not Specified)  Problem: Glaucoma      Relation: Neg Hx          Age of Onset: (Not Specified)  Problem: Macular degeneration      Relation: Neg Hx          Age of Onset: (Not Specified)  Problem: Retinal detachment      Relation: Neg Hx          Age of Onset: (Not Specified)  Problem: Strabismus      Relation: Neg Hx          Age of Onset: (Not Specified)  Problem: Stroke      Relation: Neg Hx          Age of Onset: (Not Specified)      Current Outpatient Medications:  acetaminophen (TYLENOL) 500 MG tablet, Take 2 tablets (1,000 mg) by mouth at onset of pain or headache, may repeat if needed., Disp: , Rfl:   ALPRAZolam (XANAX) 0.5 MG tablet, Take 1 tablet (0.5 mg total) by mouth 2 (two) times daily as needed for Anxiety., Disp: 60 tablet, Rfl: 4  apixaban (ELIQUIS) 5 mg Tab, Take 1 tablet (5 mg total) by mouth 2 (two) times daily., Disp: 90 tablet, Rfl: 5  atorvastatin (LIPITOR) 40 MG tablet, Take 1 tablet (40 mg total) by mouth once daily., Disp: 90 tablet, Rfl: 3  b complex vitamins capsule, Take 1 capsule by mouth once daily., Disp: , Rfl:   colchicine (COLCRYS) 0.6 mg tablet, For gout attack: Take TWO tablets by mouth, then, 2 hours later, take ONE additional  tablet. Then take 1 tablet twice daily only if still needed for gout pain. (Patient taking differently: For gout attack: Take TWO tablets by mouth, then, 2 hours later, take ONE additional tablet. Then take 1 tablet twice daily only if still needed for gout pain.), Disp: 20 tablet, Rfl: 1  DULoxetine (CYMBALTA) 60 MG capsule, Take 2 capsules (120 mg total) by mouth once daily., Disp: 180 capsule, Rfl: 3  ferrous sulfate (SLOW RELEASE IRON) 142 mg (45 mg iron) TbSR, Take 1 tablet (142 mg total) by mouth once daily. FOR 7 DAY THEN INCREASE TO TWICE DAILY AS TOLERATED, Disp: , Rfl:   furosemide (LASIX) 40 MG tablet, Take 1 tablet (40 mg total) by mouth 2 (two) times daily. Resume as needed for edema until followup with your PCP., Disp: 60 tablet, Rfl: 11  gluc-shikha-msm#2-Z-jgxi-reymundo-bor 750 mg-644 mg- 30 mg-1 mg Tab, Take 1 tablet by mouth once daily. , Disp: , Rfl:   krill/om-3/dha/epa/phospho/ast (MEGARED OMEGA-3 KRILL OIL ORAL), Take 1 capsule by mouth once daily., Disp: , Rfl:   lisinopriL (PRINIVIL,ZESTRIL) 40 MG tablet, Take 0.5 tablets (20 mg total) by mouth once daily., Disp: 45 tablet, Rfl: 3  medroxyPROGESTERone (PROVERA) 10 MG tablet, Take 2 tablets (20 mg total) by mouth 3 (three) times daily., Disp: 180 tablet, Rfl: 2  melatonin 10 mg Tab, Take 1-2 tablets by mouth nightly as needed (Sleep)., Disp: , Rfl:   metoprolol succinate (TOPROL-XL) 50 MG 24 hr tablet, Take 1 tablet (50 mg total) by mouth 2 (two) times daily., Disp: 180 tablet, Rfl: 3  multivitamin (THERAGRAN) per tablet, Take 1 tablet by mouth once daily., Disp: , Rfl:   NIFEdipine (PROCARDIA-XL) 90 MG (OSM) 24 hr tablet, Take 1 tablet (90 mg total) by mouth once daily. HOLD UNTIL FOLLOW-UP WITH YOUR PCP., Disp: 30 tablet, Rfl: 11  omeprazole (PRILOSEC) 20 MG capsule, TAKE 1 CAPSULE BY MOUTH ONCE DAILY, Disp: 90 capsule, Rfl: 3  ondansetron (ZOFRAN-ODT) 4 MG TbDL, Take 2 tablets (8 mg total) by mouth every 8 (eight) hours as needed (nasuea,  vomiting)., Disp: 8 tablet, Rfl: 0  polyethylene glycol (GLYCOLAX) 17 gram PwPk, Take by mouth as needed (constipation)., Disp: , Rfl:   potassium chloride SA (K-DUR,KLOR-CON) 20 MEQ tablet, Take 1 tablet (20 mEq total) by mouth once daily. ONLY TAKE WITH LASIX., Disp: 30 tablet, Rfl: 11  propafenone (RYTHMOL) 225 MG Tab, Take 1 tablet (225 mg total) by mouth every 8 (eight) hours., Disp: 90 tablet, Rfl: 11  traZODone (DESYREL) 100 MG tablet, Take 1 tablet (100 mg total) by mouth nightly as needed for Insomnia., Disp: 90 tablet, Rfl: 3  UNABLE TO FIND, Take 1 tablet by mouth once daily. Trino's Leg Cramps, Disp: , Rfl:     No current facility-administered medications for this visit.  Facility-Administered Medications Ordered in Other Visits:  sodium chloride 0.9% bolus 1,000 mL, 1,000 mL, Intravenous, Once, Laurie Montgomery NP            Review of Systems   Constitutional: Negative for malaise/fatigue.   HENT: Negative.    Eyes: Negative.    Cardiovascular: Negative for chest pain, dyspnea on exertion, irregular heartbeat, leg swelling, near-syncope, palpitations and syncope.   Respiratory: Positive for shortness of breath.    Endocrine: Negative.    Hematologic/Lymphatic: Negative.    Skin: Negative.    Musculoskeletal: Negative.    Gastrointestinal: Negative.    Genitourinary: Negative.    Neurological: Negative.  Negative for dizziness and light-headedness.   Psychiatric/Behavioral: Negative.    Allergic/Immunologic: Negative.         Objective:    Physical Exam        ECG: NSR nl NM, RBBB 152 ms (new 3/22)    Assessment:       1. Atrial Fibrillation (paroxysmal)         Plan:       56 yoF here for AF management. Rare and brief recurrence since second ablation. Continue current therapy. With regard to upcoming hysterectomy, she is at low risk for adverse cardiac arrhythmia events.     RTC 6m

## 2022-04-29 DIAGNOSIS — I48.91 ATRIAL FIBRILLATION, UNSPECIFIED TYPE: ICD-10-CM

## 2022-05-02 RX ORDER — METOPROLOL SUCCINATE 50 MG/1
50 TABLET, EXTENDED RELEASE ORAL 2 TIMES DAILY
Qty: 180 TABLET | Refills: 3 | Status: SHIPPED | OUTPATIENT
Start: 2022-05-02 | End: 2023-03-16 | Stop reason: SDUPTHER

## 2022-05-05 ENCOUNTER — OFFICE VISIT (OUTPATIENT)
Dept: OBSTETRICS AND GYNECOLOGY | Facility: CLINIC | Age: 57
End: 2022-05-05
Payer: COMMERCIAL

## 2022-05-05 VITALS
WEIGHT: 288.81 LBS | BODY MASS INDEX: 46.41 KG/M2 | HEIGHT: 66 IN | SYSTOLIC BLOOD PRESSURE: 112 MMHG | DIASTOLIC BLOOD PRESSURE: 72 MMHG

## 2022-05-05 DIAGNOSIS — Z01.419 WELL WOMAN EXAM WITH ROUTINE GYNECOLOGICAL EXAM: Primary | ICD-10-CM

## 2022-05-05 DIAGNOSIS — Z12.4 SCREENING FOR CERVICAL CANCER: ICD-10-CM

## 2022-05-05 DIAGNOSIS — N95.0 POSTMENOPAUSAL BLEEDING: ICD-10-CM

## 2022-05-05 PROCEDURE — 3044F PR MOST RECENT HEMOGLOBIN A1C LEVEL <7.0%: ICD-10-PCS | Mod: CPTII,S$GLB,, | Performed by: OBSTETRICS & GYNECOLOGY

## 2022-05-05 PROCEDURE — 4010F ACE/ARB THERAPY RXD/TAKEN: CPT | Mod: CPTII,S$GLB,, | Performed by: OBSTETRICS & GYNECOLOGY

## 2022-05-05 PROCEDURE — 3078F PR MOST RECENT DIASTOLIC BLOOD PRESSURE < 80 MM HG: ICD-10-PCS | Mod: CPTII,S$GLB,, | Performed by: OBSTETRICS & GYNECOLOGY

## 2022-05-05 PROCEDURE — 4010F PR ACE/ARB THEARPY RXD/TAKEN: ICD-10-PCS | Mod: CPTII,S$GLB,, | Performed by: OBSTETRICS & GYNECOLOGY

## 2022-05-05 PROCEDURE — 99396 PR PREVENTIVE VISIT,EST,40-64: ICD-10-PCS | Mod: S$GLB,,, | Performed by: OBSTETRICS & GYNECOLOGY

## 2022-05-05 PROCEDURE — 3078F DIAST BP <80 MM HG: CPT | Mod: CPTII,S$GLB,, | Performed by: OBSTETRICS & GYNECOLOGY

## 2022-05-05 PROCEDURE — 3008F BODY MASS INDEX DOCD: CPT | Mod: CPTII,S$GLB,, | Performed by: OBSTETRICS & GYNECOLOGY

## 2022-05-05 PROCEDURE — 1159F MED LIST DOCD IN RCRD: CPT | Mod: CPTII,S$GLB,, | Performed by: OBSTETRICS & GYNECOLOGY

## 2022-05-05 PROCEDURE — 3074F SYST BP LT 130 MM HG: CPT | Mod: CPTII,S$GLB,, | Performed by: OBSTETRICS & GYNECOLOGY

## 2022-05-05 PROCEDURE — 1160F RVW MEDS BY RX/DR IN RCRD: CPT | Mod: CPTII,S$GLB,, | Performed by: OBSTETRICS & GYNECOLOGY

## 2022-05-05 PROCEDURE — 88175 CYTOPATH C/V AUTO FLUID REDO: CPT | Performed by: OBSTETRICS & GYNECOLOGY

## 2022-05-05 PROCEDURE — 3044F HG A1C LEVEL LT 7.0%: CPT | Mod: CPTII,S$GLB,, | Performed by: OBSTETRICS & GYNECOLOGY

## 2022-05-05 PROCEDURE — 99999 PR PBB SHADOW E&M-EST. PATIENT-LVL IV: ICD-10-PCS | Mod: PBBFAC,,, | Performed by: OBSTETRICS & GYNECOLOGY

## 2022-05-05 PROCEDURE — 3008F PR BODY MASS INDEX (BMI) DOCUMENTED: ICD-10-PCS | Mod: CPTII,S$GLB,, | Performed by: OBSTETRICS & GYNECOLOGY

## 2022-05-05 PROCEDURE — 87624 HPV HI-RISK TYP POOLED RSLT: CPT | Performed by: OBSTETRICS & GYNECOLOGY

## 2022-05-05 PROCEDURE — 1160F PR REVIEW ALL MEDS BY PRESCRIBER/CLIN PHARMACIST DOCUMENTED: ICD-10-PCS | Mod: CPTII,S$GLB,, | Performed by: OBSTETRICS & GYNECOLOGY

## 2022-05-05 PROCEDURE — 99999 PR PBB SHADOW E&M-EST. PATIENT-LVL IV: CPT | Mod: PBBFAC,,, | Performed by: OBSTETRICS & GYNECOLOGY

## 2022-05-05 PROCEDURE — 99396 PREV VISIT EST AGE 40-64: CPT | Mod: S$GLB,,, | Performed by: OBSTETRICS & GYNECOLOGY

## 2022-05-05 PROCEDURE — 1159F PR MEDICATION LIST DOCUMENTED IN MEDICAL RECORD: ICD-10-PCS | Mod: CPTII,S$GLB,, | Performed by: OBSTETRICS & GYNECOLOGY

## 2022-05-05 PROCEDURE — 3074F PR MOST RECENT SYSTOLIC BLOOD PRESSURE < 130 MM HG: ICD-10-PCS | Mod: CPTII,S$GLB,, | Performed by: OBSTETRICS & GYNECOLOGY

## 2022-05-05 RX ORDER — PROGESTERONE 200 MG/1
200 CAPSULE ORAL 2 TIMES DAILY
Qty: 60 CAPSULE | Refills: 1 | Status: SHIPPED | OUTPATIENT
Start: 2022-05-05 | End: 2022-07-13 | Stop reason: SDUPTHER

## 2022-05-05 NOTE — PROGRESS NOTES
Subjective:       Patient ID: Vicky Alvarado is a 56 y.o. female.    Chief Complaint:  Consult      History of Present Illness  HPI  Annual Exam-Postmenopausal  Patient presents for annual exam. The patient has no complaints today. The patient is sexually active. GYN screening history: last mammogram: was normal. The patient is taking hormone replacement therapy. Patient reports post-menopausal vaginal bleeding. The patient wears seatbelts: yes. The patient participates in regular exercise: no. Has the patient ever been transfused or tattooed?: no. The patient reports that there is not domestic violence in her life.    Known to me as she has been having  bleeding since 2/2022.  Was seen in Gyn clinic for follow up (path benign).  Started on high dose progesterone.  But continuing to bleed and ready to proceed with the definitive treatment.  Last u/s as follows:    CLINICAL HISTORY:  post menopausal vaginal bleeding, on eliquis;     TECHNIQUE:  Transabdominal sonography of the pelvis was performed, followed by transvaginal sonography to better evaluate the uterus and ovaries.     COMPARISON:  None.     FINDINGS:  The uterus measures a proximally 11.0 x 8.6 x 6.8 cm.  The endometrial stripe is thickened measuring up to 1.7 cm.  There is heterogeneous avascular material within the lower uterine segment extending to the cervical canal suggesting blood products.  There is a partially calcified fibroid measuring approximately 2.9 x 2.3 x 3.1 cm.     The ovaries are not identified.  There is trace fluid in the pelvis.  There are a few prominent vessels within the left adnexa, correlation with any history of pelvic congestion syndrome.     Impression:     This report was flagged in Epic as abnormal.     Endometrial thickening, abnormal in this reportedly postmenopausal patient.  There is heterogeneous avascular material within the lower uterine segment extending to the cervical canal concerning for blood  products.  Correlation is advised, consultation with OBGYN is recommended.     Uterine leiomyoma.        GYN & OB History  Patient's last menstrual period was 03/25/2022 (exact date).   Date of Last Pap: 5/5/2022    OB History   No obstetric history on file.       Review of Systems  Review of Systems   Constitutional: Negative for activity change, appetite change and fatigue.   HENT: Negative.  Negative for tinnitus.    Eyes: Negative.    Respiratory: Negative for cough and shortness of breath.    Cardiovascular: Negative for chest pain and palpitations.   Gastrointestinal: Negative.  Negative for abdominal pain, blood in stool, constipation, diarrhea and nausea.   Endocrine: Negative.  Negative for hot flashes.   Genitourinary: Negative for dysmenorrhea, dyspareunia, dysuria, frequency, menstrual problem, pelvic pain, vaginal discharge, urinary incontinence and postcoital bleeding.   Musculoskeletal: Negative for back pain and joint swelling.   Integumentary:  Negative for rash, breast mass, nipple discharge and breast skin changes.   Neurological: Negative.  Negative for headaches.   Hematological: Negative.  Does not bruise/bleed easily.   Psychiatric/Behavioral: The patient is not nervous/anxious.    Breast: negative.  Negative for mass, nipple discharge and skin changes          Objective:    Physical Exam:   Constitutional: She is oriented to person, place, and time. She appears well-developed and well-nourished. No distress.    HENT:   Head: Normocephalic and atraumatic.    Eyes: Pupils are equal, round, and reactive to light. Conjunctivae and lids are normal.     Cardiovascular: Normal rate and regular rhythm.  Exam reveals no edema.     Pulmonary/Chest: Effort normal and breath sounds normal. She has no wheezes.        Abdominal: Soft. Bowel sounds are normal. She exhibits no distension. There is no abdominal tenderness. There is no rebound and no guarding.     Genitourinary:    Vagina, uterus, right adnexa  and left adnexa normal.      Pelvic exam was performed with patient supine.   The external female genitalia was normal.   Labial bartholins normal.There is no tenderness or lesion on the right labia. There is no tenderness or lesion on the left labia. Cervix is normal. Right adnexum displays no mass and no tenderness. Left adnexum displays no mass and no tenderness. No  no vaginal discharge, rectocele, cystocele or unspecified prolapse of vaginal walls in the vagina. Cervix exhibits no motion tenderness and no discharge. Uterus is not deviated, not fixed and not hosting fibroids. Normal urethral meatus.Urethral Meatus exhibits: urethral lesionUrethra findings: no tendernessBladder findings: no bladder tenderness          Musculoskeletal: Normal range of motion and moves all extremeties.       Neurological: She is alert and oriented to person, place, and time. She has normal strength.    Skin: Skin is warm and dry.    Psychiatric: She has a normal mood and affect. Her speech is normal and behavior is normal. Thought content normal. Her mood appears not anxious. She does not exhibit a depressed mood. She expresses no suicidal plans and no homicidal plans.          Assessment:        1. Well woman exam with routine gynecological exam    2. Postmenopausal bleeding    3. Screening for cervical cancer                Plan:      Vicky was seen today for consult.    Diagnoses and all orders for this visit:    Well woman exam with routine gynecological exam    Postmenopausal bleeding  -     CBC Auto Differential; Future  -     Iron and TIBC; Future  -     progesterone (PROMETRIUM) 200 MG capsule; Take 1 capsule (200 mg total) by mouth 2 (two) times a day.  Had a long discussion today about her continued bleeding.  Patient is ready to proceed with hysterectomy.  Given her ultrasound findings and continued bleeding, it is not unreasonable since the medication is not fully controlling this.  The bleeding is starting to impact  her work and the number of days that she is missing.  Patient has a short window of opportunity to have the surgery completed (first week in June).  She would be a good robotic hysterectomy candidate.    She is in the process of completing all of her preoperative clearance from Cardiology to guide her Eliquis bridging.  She will meet with her cardiologist next week.  I will reach out to 1 of my partners to see about coordinating a robotic hysterectomy.  All questions were answered  Screening for cervical cancer  -     HPV High Risk Genotypes, PCR  -     Liquid-Based Pap Smear, Screening

## 2022-05-05 NOTE — Clinical Note
Hey guys, This is the patient I was telling you about.  She would be a good robotic hyst candidate.  Her window for surgery is tight b/c of work.  She will have all of her surgical clearance finalized next week.  Thanks! Marko

## 2022-05-06 ENCOUNTER — TELEPHONE (OUTPATIENT)
Dept: OBSTETRICS AND GYNECOLOGY | Facility: CLINIC | Age: 57
End: 2022-05-06
Payer: COMMERCIAL

## 2022-05-06 NOTE — TELEPHONE ENCOUNTER
LVM:  referral from Dr Holland for Dr Barriga  - KEY THRASHER candidate?  Calling pt to schedule appt within next 2 weeks per Dr Barriga  Thank You

## 2022-05-07 ENCOUNTER — HOSPITAL ENCOUNTER (INPATIENT)
Facility: HOSPITAL | Age: 57
LOS: 5 days | Discharge: HOME | DRG: 872 | End: 2022-05-14
Attending: EMERGENCY MEDICINE | Admitting: EMERGENCY MEDICINE
Payer: COMMERCIAL

## 2022-05-07 DIAGNOSIS — M79.606 LEG PAIN: ICD-10-CM

## 2022-05-07 DIAGNOSIS — M79.89 LEG SWELLING: ICD-10-CM

## 2022-05-07 DIAGNOSIS — L03.116 CELLULITIS OF LEFT LOWER EXTREMITY: Primary | ICD-10-CM

## 2022-05-07 DIAGNOSIS — L03.116 LEFT LEG CELLULITIS: ICD-10-CM

## 2022-05-07 DIAGNOSIS — R07.9 CHEST PAIN: ICD-10-CM

## 2022-05-07 PROBLEM — L03.119 CELLULITIS OF EXTREMITY: Status: ACTIVE | Noted: 2022-05-07

## 2022-05-07 PROBLEM — R73.03 PRE-DIABETES: Status: ACTIVE | Noted: 2022-05-07

## 2022-05-07 PROBLEM — D50.9 IRON DEFICIENCY ANEMIA: Status: ACTIVE | Noted: 2022-05-07

## 2022-05-07 LAB
ALBUMIN SERPL BCP-MCNC: 3.3 G/DL (ref 3.5–5.2)
ALLENS TEST: ABNORMAL
ALP SERPL-CCNC: 98 U/L (ref 55–135)
ALT SERPL W/O P-5'-P-CCNC: 17 U/L (ref 10–44)
ANION GAP SERPL CALC-SCNC: 15 MMOL/L (ref 8–16)
AST SERPL-CCNC: 47 U/L (ref 10–40)
BASOPHILS # BLD AUTO: 0.05 K/UL (ref 0–0.2)
BASOPHILS NFR BLD: 0.6 % (ref 0–1.9)
BILIRUB SERPL-MCNC: 0.8 MG/DL (ref 0.1–1)
BUN SERPL-MCNC: 9 MG/DL (ref 6–20)
CALCIUM SERPL-MCNC: 9.1 MG/DL (ref 8.7–10.5)
CHLORIDE SERPL-SCNC: 101 MMOL/L (ref 95–110)
CO2 SERPL-SCNC: 19 MMOL/L (ref 23–29)
CREAT SERPL-MCNC: 0.9 MG/DL (ref 0.5–1.4)
CRP SERPL-MCNC: 199.9 MG/L (ref 0–8.2)
DELSYS: ABNORMAL
DIFFERENTIAL METHOD: ABNORMAL
EOSINOPHIL # BLD AUTO: 0.1 K/UL (ref 0–0.5)
EOSINOPHIL NFR BLD: 1.3 % (ref 0–8)
ERYTHROCYTE [DISTWIDTH] IN BLOOD BY AUTOMATED COUNT: 17.2 % (ref 11.5–14.5)
ERYTHROCYTE [SEDIMENTATION RATE] IN BLOOD BY WESTERGREN METHOD: 107 MM/HR (ref 0–36)
EST. GFR  (AFRICAN AMERICAN): >60 ML/MIN/1.73 M^2
EST. GFR  (NON AFRICAN AMERICAN): >60 ML/MIN/1.73 M^2
ESTIMATED AVG GLUCOSE: 117 MG/DL (ref 68–131)
GLUCOSE SERPL-MCNC: 141 MG/DL (ref 70–110)
HBA1C MFR BLD: 5.7 % (ref 4–5.6)
HCO3 UR-SCNC: 26.6 MMOL/L (ref 24–28)
HCT VFR BLD AUTO: 31.9 % (ref 37–48.5)
HGB BLD-MCNC: 10.2 G/DL (ref 12–16)
IMM GRANULOCYTES # BLD AUTO: 0.03 K/UL (ref 0–0.04)
IMM GRANULOCYTES NFR BLD AUTO: 0.4 % (ref 0–0.5)
IRON SERPL-MCNC: 34 UG/DL (ref 30–160)
LACTATE SERPL-SCNC: 2.6 MMOL/L (ref 0.5–2.2)
LACTATE SERPL-SCNC: 3.8 MMOL/L (ref 0.5–2.2)
LYMPHOCYTES # BLD AUTO: 1.5 K/UL (ref 1–4.8)
LYMPHOCYTES NFR BLD: 17.3 % (ref 18–48)
MCH RBC QN AUTO: 27.5 PG (ref 27–31)
MCHC RBC AUTO-ENTMCNC: 32 G/DL (ref 32–36)
MCV RBC AUTO: 86 FL (ref 82–98)
MODE: ABNORMAL
MONOCYTES # BLD AUTO: 0.7 K/UL (ref 0.3–1)
MONOCYTES NFR BLD: 7.9 % (ref 4–15)
NEUTROPHILS # BLD AUTO: 6.1 K/UL (ref 1.8–7.7)
NEUTROPHILS NFR BLD: 72.5 % (ref 38–73)
NRBC BLD-RTO: 0 /100 WBC
PCO2 BLDA: 42.1 MMHG (ref 35–45)
PH SMN: 7.41 [PH] (ref 7.35–7.45)
PLATELET # BLD AUTO: 161 K/UL (ref 150–450)
PMV BLD AUTO: 12.2 FL (ref 9.2–12.9)
PO2 BLDA: 55 MMHG (ref 40–60)
POC BE: 2 MMOL/L
POC SATURATED O2: 88 % (ref 95–100)
POC TCO2: 28 MMOL/L (ref 24–29)
POTASSIUM SERPL-SCNC: 5.2 MMOL/L (ref 3.5–5.1)
PROT SERPL-MCNC: 7.7 G/DL (ref 6–8.4)
RBC # BLD AUTO: 3.71 M/UL (ref 4–5.4)
SAMPLE: ABNORMAL
SATURATED IRON: 8 % (ref 20–50)
SITE: ABNORMAL
SODIUM SERPL-SCNC: 135 MMOL/L (ref 136–145)
TOTAL IRON BINDING CAPACITY: 444 UG/DL (ref 250–450)
TRANSFERRIN SERPL-MCNC: 300 MG/DL (ref 200–375)
WBC # BLD AUTO: 8.38 K/UL (ref 3.9–12.7)

## 2022-05-07 PROCEDURE — 82803 BLOOD GASES ANY COMBINATION: CPT

## 2022-05-07 PROCEDURE — 94761 N-INVAS EAR/PLS OXIMETRY MLT: CPT

## 2022-05-07 PROCEDURE — 86803 HEPATITIS C AB TEST: CPT | Performed by: EMERGENCY MEDICINE

## 2022-05-07 PROCEDURE — G0378 HOSPITAL OBSERVATION PER HR: HCPCS

## 2022-05-07 PROCEDURE — 85652 RBC SED RATE AUTOMATED: CPT | Performed by: EMERGENCY MEDICINE

## 2022-05-07 PROCEDURE — 25000003 PHARM REV CODE 250: Performed by: STUDENT IN AN ORGANIZED HEALTH CARE EDUCATION/TRAINING PROGRAM

## 2022-05-07 PROCEDURE — 99285 EMERGENCY DEPT VISIT HI MDM: CPT | Mod: ,,, | Performed by: EMERGENCY MEDICINE

## 2022-05-07 PROCEDURE — 96375 TX/PRO/DX INJ NEW DRUG ADDON: CPT

## 2022-05-07 PROCEDURE — 87389 HIV-1 AG W/HIV-1&-2 AB AG IA: CPT | Performed by: EMERGENCY MEDICINE

## 2022-05-07 PROCEDURE — 63600175 PHARM REV CODE 636 W HCPCS: Performed by: STUDENT IN AN ORGANIZED HEALTH CARE EDUCATION/TRAINING PROGRAM

## 2022-05-07 PROCEDURE — 96376 TX/PRO/DX INJ SAME DRUG ADON: CPT

## 2022-05-07 PROCEDURE — 96365 THER/PROPH/DIAG IV INF INIT: CPT

## 2022-05-07 PROCEDURE — 25500020 PHARM REV CODE 255: Performed by: EMERGENCY MEDICINE

## 2022-05-07 PROCEDURE — 87040 BLOOD CULTURE FOR BACTERIA: CPT | Mod: 59 | Performed by: EMERGENCY MEDICINE

## 2022-05-07 PROCEDURE — 83605 ASSAY OF LACTIC ACID: CPT | Mod: 91 | Performed by: STUDENT IN AN ORGANIZED HEALTH CARE EDUCATION/TRAINING PROGRAM

## 2022-05-07 PROCEDURE — 85025 COMPLETE CBC W/AUTO DIFF WBC: CPT | Performed by: EMERGENCY MEDICINE

## 2022-05-07 PROCEDURE — 83036 HEMOGLOBIN GLYCOSYLATED A1C: CPT | Performed by: EMERGENCY MEDICINE

## 2022-05-07 PROCEDURE — 25000003 PHARM REV CODE 250: Performed by: EMERGENCY MEDICINE

## 2022-05-07 PROCEDURE — 99285 PR EMERGENCY DEPT VISIT,LEVEL V: ICD-10-PCS | Mod: ,,, | Performed by: EMERGENCY MEDICINE

## 2022-05-07 PROCEDURE — 83605 ASSAY OF LACTIC ACID: CPT | Performed by: EMERGENCY MEDICINE

## 2022-05-07 PROCEDURE — 96368 THER/DIAG CONCURRENT INF: CPT

## 2022-05-07 PROCEDURE — 99285 EMERGENCY DEPT VISIT HI MDM: CPT | Mod: 25

## 2022-05-07 PROCEDURE — 84466 ASSAY OF TRANSFERRIN: CPT | Performed by: EMERGENCY MEDICINE

## 2022-05-07 PROCEDURE — 99223 PR INITIAL HOSPITAL CARE,LEVL III: ICD-10-PCS | Mod: ,,, | Performed by: STUDENT IN AN ORGANIZED HEALTH CARE EDUCATION/TRAINING PROGRAM

## 2022-05-07 PROCEDURE — 80053 COMPREHEN METABOLIC PANEL: CPT | Performed by: EMERGENCY MEDICINE

## 2022-05-07 PROCEDURE — 99900035 HC TECH TIME PER 15 MIN (STAT)

## 2022-05-07 PROCEDURE — 86140 C-REACTIVE PROTEIN: CPT | Performed by: EMERGENCY MEDICINE

## 2022-05-07 PROCEDURE — 99223 1ST HOSP IP/OBS HIGH 75: CPT | Mod: ,,, | Performed by: STUDENT IN AN ORGANIZED HEALTH CARE EDUCATION/TRAINING PROGRAM

## 2022-05-07 PROCEDURE — 63600175 PHARM REV CODE 636 W HCPCS: Performed by: EMERGENCY MEDICINE

## 2022-05-07 RX ORDER — HYDROMORPHONE HYDROCHLORIDE 1 MG/ML
1 INJECTION, SOLUTION INTRAMUSCULAR; INTRAVENOUS; SUBCUTANEOUS
Status: DISCONTINUED | OUTPATIENT
Start: 2022-05-07 | End: 2022-05-07

## 2022-05-07 RX ORDER — MORPHINE SULFATE 2 MG/ML
6 INJECTION, SOLUTION INTRAMUSCULAR; INTRAVENOUS
Status: COMPLETED | OUTPATIENT
Start: 2022-05-07 | End: 2022-05-07

## 2022-05-07 RX ORDER — DULOXETIN HYDROCHLORIDE 60 MG/1
120 CAPSULE, DELAYED RELEASE ORAL DAILY
Status: DISCONTINUED | OUTPATIENT
Start: 2022-05-08 | End: 2022-05-14 | Stop reason: HOSPADM

## 2022-05-07 RX ORDER — MICONAZOLE NITRATE 2 %
POWDER (GRAM) TOPICAL 2 TIMES DAILY
Status: DISCONTINUED | OUTPATIENT
Start: 2022-05-07 | End: 2022-05-14 | Stop reason: HOSPADM

## 2022-05-07 RX ORDER — TRAZODONE HYDROCHLORIDE 50 MG/1
50 TABLET ORAL NIGHTLY PRN
Status: DISCONTINUED | OUTPATIENT
Start: 2022-05-07 | End: 2022-05-14 | Stop reason: HOSPADM

## 2022-05-07 RX ORDER — LISINOPRIL 20 MG/1
20 TABLET ORAL DAILY
Status: DISCONTINUED | OUTPATIENT
Start: 2022-05-08 | End: 2022-05-09

## 2022-05-07 RX ORDER — ACETAMINOPHEN 325 MG/1
650 TABLET ORAL EVERY 8 HOURS PRN
Status: DISCONTINUED | OUTPATIENT
Start: 2022-05-07 | End: 2022-05-14 | Stop reason: HOSPADM

## 2022-05-07 RX ORDER — GLUCAGON 1 MG
1 KIT INJECTION
Status: DISCONTINUED | OUTPATIENT
Start: 2022-05-07 | End: 2022-05-14 | Stop reason: HOSPADM

## 2022-05-07 RX ORDER — IBUPROFEN 200 MG
16 TABLET ORAL
Status: DISCONTINUED | OUTPATIENT
Start: 2022-05-07 | End: 2022-05-14 | Stop reason: HOSPADM

## 2022-05-07 RX ORDER — IBUPROFEN 200 MG
24 TABLET ORAL
Status: DISCONTINUED | OUTPATIENT
Start: 2022-05-07 | End: 2022-05-14 | Stop reason: HOSPADM

## 2022-05-07 RX ORDER — ATORVASTATIN CALCIUM 20 MG/1
40 TABLET, FILM COATED ORAL DAILY
Status: DISCONTINUED | OUTPATIENT
Start: 2022-05-08 | End: 2022-05-14 | Stop reason: HOSPADM

## 2022-05-07 RX ORDER — METOPROLOL TARTRATE 50 MG/1
50 TABLET ORAL
Status: COMPLETED | OUTPATIENT
Start: 2022-05-07 | End: 2022-05-07

## 2022-05-07 RX ORDER — POLYETHYLENE GLYCOL 3350 17 G/17G
17 POWDER, FOR SOLUTION ORAL 2 TIMES DAILY PRN
Status: DISCONTINUED | OUTPATIENT
Start: 2022-05-07 | End: 2022-05-07

## 2022-05-07 RX ORDER — HYDROMORPHONE HYDROCHLORIDE 1 MG/ML
1 INJECTION, SOLUTION INTRAMUSCULAR; INTRAVENOUS; SUBCUTANEOUS
Status: COMPLETED | OUTPATIENT
Start: 2022-05-07 | End: 2022-05-07

## 2022-05-07 RX ORDER — NALOXONE HCL 0.4 MG/ML
0.02 VIAL (ML) INJECTION
Status: DISCONTINUED | OUTPATIENT
Start: 2022-05-07 | End: 2022-05-14 | Stop reason: HOSPADM

## 2022-05-07 RX ORDER — TALC
6 POWDER (GRAM) TOPICAL NIGHTLY
Status: DISCONTINUED | OUTPATIENT
Start: 2022-05-07 | End: 2022-05-14 | Stop reason: HOSPADM

## 2022-05-07 RX ORDER — METOPROLOL SUCCINATE 50 MG/1
50 TABLET, EXTENDED RELEASE ORAL 2 TIMES DAILY
Status: DISCONTINUED | OUTPATIENT
Start: 2022-05-08 | End: 2022-05-09

## 2022-05-07 RX ORDER — OXYCODONE HYDROCHLORIDE 5 MG/1
5 TABLET ORAL EVERY 4 HOURS PRN
Status: DISCONTINUED | OUTPATIENT
Start: 2022-05-07 | End: 2022-05-08

## 2022-05-07 RX ORDER — NIFEDIPINE 30 MG/1
90 TABLET, EXTENDED RELEASE ORAL DAILY
Status: DISCONTINUED | OUTPATIENT
Start: 2022-05-08 | End: 2022-05-09

## 2022-05-07 RX ORDER — ALPRAZOLAM 0.5 MG/1
0.5 TABLET ORAL 2 TIMES DAILY PRN
Status: DISCONTINUED | OUTPATIENT
Start: 2022-05-07 | End: 2022-05-14 | Stop reason: HOSPADM

## 2022-05-07 RX ORDER — ONDANSETRON 8 MG/1
8 TABLET, ORALLY DISINTEGRATING ORAL EVERY 8 HOURS PRN
Status: DISCONTINUED | OUTPATIENT
Start: 2022-05-07 | End: 2022-05-14 | Stop reason: HOSPADM

## 2022-05-07 RX ORDER — FUROSEMIDE 40 MG/1
40 TABLET ORAL 2 TIMES DAILY
Status: DISCONTINUED | OUTPATIENT
Start: 2022-05-07 | End: 2022-05-08

## 2022-05-07 RX ORDER — POLYETHYLENE GLYCOL 3350 17 G/17G
17 POWDER, FOR SOLUTION ORAL 2 TIMES DAILY PRN
Status: DISCONTINUED | OUTPATIENT
Start: 2022-05-07 | End: 2022-05-14 | Stop reason: HOSPADM

## 2022-05-07 RX ORDER — ONDANSETRON 2 MG/ML
4 INJECTION INTRAMUSCULAR; INTRAVENOUS
Status: COMPLETED | OUTPATIENT
Start: 2022-05-07 | End: 2022-05-07

## 2022-05-07 RX ORDER — PANTOPRAZOLE SODIUM 40 MG/1
40 TABLET, DELAYED RELEASE ORAL DAILY
Refills: 3 | Status: DISCONTINUED | OUTPATIENT
Start: 2022-05-08 | End: 2022-05-14 | Stop reason: HOSPADM

## 2022-05-07 RX ADMIN — VANCOMYCIN HYDROCHLORIDE 2000 MG: 10 INJECTION, POWDER, LYOPHILIZED, FOR SOLUTION INTRAVENOUS at 03:05

## 2022-05-07 RX ADMIN — PIPERACILLIN SODIUM AND TAZOBACTAM SODIUM 4.5 G: 4; .5 INJECTION, POWDER, FOR SOLUTION INTRAVENOUS at 01:05

## 2022-05-07 RX ADMIN — PIPERACILLIN SODIUM AND TAZOBACTAM SODIUM 4.5 G: 4; .5 INJECTION, POWDER, LYOPHILIZED, FOR SOLUTION INTRAVENOUS at 10:05

## 2022-05-07 RX ADMIN — MORPHINE SULFATE 6 MG: 2 INJECTION, SOLUTION INTRAMUSCULAR; INTRAVENOUS at 02:05

## 2022-05-07 RX ADMIN — FUROSEMIDE 40 MG: 40 TABLET ORAL at 08:05

## 2022-05-07 RX ADMIN — PROPAFENONE HYDROCHLORIDE 225 MG: 150 TABLET, FILM COATED ORAL at 08:05

## 2022-05-07 RX ADMIN — MORPHINE SULFATE 6 MG: 2 INJECTION, SOLUTION INTRAMUSCULAR; INTRAVENOUS at 01:05

## 2022-05-07 RX ADMIN — HYDROMORPHONE HYDROCHLORIDE 1 MG: 1 INJECTION, SOLUTION INTRAMUSCULAR; INTRAVENOUS; SUBCUTANEOUS at 05:05

## 2022-05-07 RX ADMIN — IOHEXOL 100 ML: 350 INJECTION, SOLUTION INTRAVENOUS at 06:05

## 2022-05-07 RX ADMIN — OXYCODONE 5 MG: 5 TABLET ORAL at 10:05

## 2022-05-07 RX ADMIN — SODIUM CHLORIDE, SODIUM LACTATE, POTASSIUM CHLORIDE, AND CALCIUM CHLORIDE 2640 ML: .6; .31; .03; .02 INJECTION, SOLUTION INTRAVENOUS at 02:05

## 2022-05-07 RX ADMIN — ONDANSETRON 4 MG: 2 INJECTION INTRAMUSCULAR; INTRAVENOUS at 01:05

## 2022-05-07 RX ADMIN — METOPROLOL TARTRATE 50 MG: 50 TABLET, FILM COATED ORAL at 08:05

## 2022-05-07 NOTE — ED NOTES
Charge nurse informed of no I V access and unsuccessful restart attempts.IV fluids and vanc on hold due to no access.

## 2022-05-07 NOTE — ED PROVIDER NOTES
Encounter Date: 5/7/2022       History     Chief Complaint   Patient presents with    Cellulitis     101 temp yesterday.      57-year-old female presents with redness and pain to her left leg.  It started 36 hours ago and has gotten significantly worse.  It started just to the left shin and now is extending past the knee up into the thigh.  She had a fever to 101.0 yesterday.  She tried to go to urgent care yesterday but they were unable to see her.  There has been no injury.  Denies wading in water.        Review of patient's allergies indicates:   Allergen Reactions    Flecainide Shortness Of Breath and Swelling     Past Medical History:   Diagnosis Date    Anticoagulant long-term use     Arthritis     Atrial fibrillation     cardioversion    Atrial fibrillation with RVR     Avascular necrosis     L hand    CHF (congestive heart failure)     Depression     Encounter for blood transfusion 07/22/2020    GERD (gastroesophageal reflux disease)     Hx of psychiatric care     Hypertension     Obese     Psychiatric problem     Sleep apnea      Past Surgical History:   Procedure Laterality Date    ABLATION OF ARRHYTHMOGENIC FOCUS FOR ATRIAL FIBRILLATION N/A 7/20/2020    Procedure: Ablation atrial fibrillation;  Surgeon: Gabriel Hawley MD;  Location: [facility] EP LAB;  Service: Cardiology;  Laterality: N/A;  afib, PVI, RFA, REENA, SHADY, anes, MB, 3 Prep    ABLATION OF ARRHYTHMOGENIC FOCUS FOR ATRIAL FIBRILLATION N/A 1/24/2022    Procedure: Ablation atrial fibrillation;  Surgeon: Gabriel Hawley MD;  Location: [facility] EP LAB;  Service: Cardiology;  Laterality: N/A;  afib/afl, PVI (re-do)/CTI, RFA, REENA (cx if SR), SHADY, anes, MB, 3 Prep    APPLICATION OF WOUND VACUUM-ASSISTED CLOSURE DEVICE Left 8/3/2020    Procedure: APPLICATION, WOUND VAC;  Surgeon: SEMAJ Márquez III, MD;  Location: [facility] OR [address] [place] [address];  Service: Peripheral Vascular;  Laterality: Left;    APPLICATION OF WOUND VACUUM-ASSISTED CLOSURE DEVICE  Left 8/6/2020    Procedure: APPLICATION, WOUND VAC;  Surgeon: SEMAJ Márquez III, MD;  Location: [facility] OR 2ND FLR;  Service: Peripheral Vascular;  Laterality: Left;    CARPAL TUNNEL RELEASE Right 6/10/2020    Procedure: RELEASE, CARPAL TUNNEL - RIGHT;  Surgeon: Adelaida Hall MD;  Location: [facility] OR;  Service: Orthopedics;  Laterality: Right;  GENERAL AND REGIONAL    CARPAL TUNNEL RELEASE Left 5/5/2021    Procedure: RELEASE, CARPAL TUNNEL, LEFT;  Surgeon: Adelaida Hall MD;  Location: [facility] OR;  Service: Orthopedics;  Laterality: Left;  GENERAL & REGIONAL IN PACU    CLOSURE OF WOUND Left 8/6/2020    Procedure: CLOSURE, WOUND;  Surgeon: SEMAJ Márquez III, MD;  [facility];  Service: Peripheral Vascular;  Laterality: Left;  Complex    COLONOSCOPY N/A 8/17/2021    Procedure: COLONOSCOPY Suprep;  Surgeon: Loco Deluca MD;  Location: [facility];  Service: Endoscopy;  Laterality: N/A;    ESOPHAGOGASTRODUODENOSCOPY N/A 8/17/2021    Procedure: EGD (ESOPHAGOGASTRODUODENOSCOPY);  Surgeon: Loco Deluca MD;  Location: [facility];  Service: Endoscopy;  Laterality: N/A;  Patient is schedule to have her Covid test on 08/14/2021 at the [facility] @ 9:30am. [place]    EXPLORATION OF FEMORAL ARTERY Left 7/21/2020    Procedure: EXPLORATION, ARTERY, FEMORAL;  Surgeon: SEMAJ Márquez III, MD;  [address];  Service: Peripheral Vascular;  Laterality: Left;  1. Urgent Direct repair, L SFA branch laceration    FOOT SURGERY      HYSTEROSCOPY WITH DILATION AND CURETTAGE OF UTERUS N/A 2/19/2022    Procedure: HYSTEROSCOPY, WITH DILATION AND CURETTAGE OF UTERUS;  Surgeon: Shane Palomo MD;  [facility];  Service: OB/GYN;  Laterality: N/A;    TREATMENT OF CARDIAC ARRHYTHMIA N/A 1/29/2020    Procedure: CARDIOVERSION;  Surgeon: Gabriel Hawley MD;  Location: [facility] EP LAB;  Service: Cardiology;  Laterality: N/A;  af, demi, dccv, anes, mb, 345    TREATMENT OF  CARDIAC ARRHYTHMIA  2022    Procedure: Cardioversion or Defibrillation;  Surgeon: Gabriel Hawley MD;  Location: [facility] EP LAB;  Service: Cardiology;;    WOUND DEBRIDEMENT Left 2020    Procedure: DEBRIDEMENT, WOUND;  Surgeon: SEMAJ Márquez III, MD;  [facility];  Service: Peripheral Vascular;  Laterality: Left;     Family History   Problem Relation Age of Onset    Cataracts Mother     Hypertension Mother     Thyroid disease Mother     Diabetes Father     Hypertension Father     Hypertension Sister     Hypertension Brother     Amblyopia Neg Hx     Blindness Neg Hx     Cancer Neg Hx     Glaucoma Neg Hx     Macular degeneration Neg Hx     Retinal detachment Neg Hx     Strabismus Neg Hx     Stroke Neg Hx      Social History     Tobacco Use    Smoking status: Former Smoker     Types: Cigarettes     Quit date: 2019     Years since quittin.8    Smokeless tobacco: Never Used   Substance Use Topics    Alcohol use: No    Drug use: No     Review of Systems   Constitutional: Positive for chills and fever.   HENT: Negative for nosebleeds.    Eyes: Negative for visual disturbance.   Respiratory: Negative for shortness of breath.    Cardiovascular: Negative for chest pain.   Gastrointestinal: Negative for vomiting.   Genitourinary: Negative for frequency.   Musculoskeletal: Negative for joint swelling.   Skin: Negative for rash.   Neurological: Negative for numbness.   Hematological: Does not bruise/bleed easily.   Psychiatric/Behavioral: Negative for confusion.       Physical Exam     Initial Vitals [22 1155]   BP Pulse Resp Temp SpO2   123/76 92 16 98 °F (36.7 °C) 97 %      MAP       --         Physical Exam    Constitutional: She appears well-developed and well-nourished. She is not diaphoretic. No distress.   Patient appears uncomfortable   HENT:   Head: Normocephalic and atraumatic.   Eyes: Conjunctivae are normal.   Neck: Neck supple. No tracheal deviation present.  No JVD present.   Normal range of motion.  Cardiovascular: Normal rate, regular rhythm, normal heart sounds and intact distal pulses.   Pulmonary/Chest: Breath sounds normal. No respiratory distress. She has no wheezes. She has no rhonchi. She has no rales.   Abdominal: Abdomen is soft. Bowel sounds are normal. She exhibits no distension. There is no abdominal tenderness. There is no rebound.   Musculoskeletal:         General: Edema present.      Cervical back: Normal range of motion and neck supple.      Comments: Full range of motion joint.  No sign of septic joint.    palpable dorsalis pedis pulse on the left.     Neurological: She is alert. She has normal strength. No sensory deficit. GCS score is 15. GCS eye subscore is 4. GCS verbal subscore is 5. GCS motor subscore is 6.   Skin: Skin is warm.   Erythema to left foot shin extending up to the medial thigh   Psychiatric: She has a normal mood and affect.             ED Course   Procedures  Labs Reviewed   CULTURE, BLOOD   CULTURE, BLOOD   HIV 1 / 2 ANTIBODY   HEPATITIS C ANTIBODY   CBC W/ AUTO DIFFERENTIAL   COMPREHENSIVE METABOLIC PANEL   IRON AND TIBC   LACTIC ACID, PLASMA          Imaging Results    None          Medications   vancomycin 2 g in dextrose 5 % 500 mL IVPB (has no administration in time range)   piperacillin-tazobactam 4.5 g in sodium chloride 0.9% 100 mL IVPB (ready to mix system) (has no administration in time range)   morphine injection 6 mg (has no administration in time range)   ondansetron injection 4 mg (has no administration in time range)     Medical Decision Making:   Initial Assessment:   Patient with significant cellulitis to her left leg.  There is no crepitance to suggest necrotizing fasciitis.  No sign of arterial obstruction.  No sign of septic joint.  Will check ultrasound for DVT.  Will initiate IV antibiotics.  ED Management:  Patient had progressive pain.  Treated with IV narcotics.  She has elevated lactate.  Gave IV  antibiotics.  No clot or gas seen on ultrasound, x-ray or CT.  She does have any infusion but is able to flex her knee to 90°.  I do not believe she has a septic knee.  I consult discussed with surgery who did not believe she had necrotizing fasciitis.  Will admit to Internal Medicine for IV antibiotics.                      Clinical Impression:   Final diagnoses:  [M79.89] Leg swelling  [M79.606] Leg pain                 Michael Elizabeth MD  05/07/22 1942

## 2022-05-07 NOTE — ED NOTES
Notified by lab of a critical lab, Lactic 3.8 mmol/L. Notified Physician of critical lab   NP Amaury, C

## 2022-05-08 ENCOUNTER — PATIENT MESSAGE (OUTPATIENT)
Dept: ELECTROPHYSIOLOGY | Facility: CLINIC | Age: 57
End: 2022-05-08
Payer: COMMERCIAL

## 2022-05-08 LAB
ALBUMIN SERPL BCP-MCNC: 2.6 G/DL (ref 3.5–5.2)
ALP SERPL-CCNC: 84 U/L (ref 55–135)
ALT SERPL W/O P-5'-P-CCNC: 13 U/L (ref 10–44)
ANION GAP SERPL CALC-SCNC: 9 MMOL/L (ref 8–16)
AST SERPL-CCNC: 24 U/L (ref 10–40)
BASOPHILS # BLD AUTO: 0.05 K/UL (ref 0–0.2)
BASOPHILS NFR BLD: 0.8 % (ref 0–1.9)
BILIRUB SERPL-MCNC: 0.8 MG/DL (ref 0.1–1)
BUN SERPL-MCNC: 10 MG/DL (ref 6–20)
CALCIUM SERPL-MCNC: 8.4 MG/DL (ref 8.7–10.5)
CHLORIDE SERPL-SCNC: 98 MMOL/L (ref 95–110)
CO2 SERPL-SCNC: 23 MMOL/L (ref 23–29)
CREAT SERPL-MCNC: 0.9 MG/DL (ref 0.5–1.4)
DIFFERENTIAL METHOD: ABNORMAL
EOSINOPHIL # BLD AUTO: 0.3 K/UL (ref 0–0.5)
EOSINOPHIL NFR BLD: 5.1 % (ref 0–8)
ERYTHROCYTE [DISTWIDTH] IN BLOOD BY AUTOMATED COUNT: 17 % (ref 11.5–14.5)
EST. GFR  (AFRICAN AMERICAN): >60 ML/MIN/1.73 M^2
EST. GFR  (NON AFRICAN AMERICAN): >60 ML/MIN/1.73 M^2
GLUCOSE SERPL-MCNC: 156 MG/DL (ref 70–110)
HCT VFR BLD AUTO: 26.6 % (ref 37–48.5)
HGB BLD-MCNC: 8.1 G/DL (ref 12–16)
IMM GRANULOCYTES # BLD AUTO: 0.02 K/UL (ref 0–0.04)
IMM GRANULOCYTES NFR BLD AUTO: 0.3 % (ref 0–0.5)
LACTATE SERPL-SCNC: 1.4 MMOL/L (ref 0.5–2.2)
LYMPHOCYTES # BLD AUTO: 1.1 K/UL (ref 1–4.8)
LYMPHOCYTES NFR BLD: 17.8 % (ref 18–48)
MAGNESIUM SERPL-MCNC: 1.3 MG/DL (ref 1.6–2.6)
MCH RBC QN AUTO: 27.8 PG (ref 27–31)
MCHC RBC AUTO-ENTMCNC: 30.5 G/DL (ref 32–36)
MCV RBC AUTO: 91 FL (ref 82–98)
MONOCYTES # BLD AUTO: 0.6 K/UL (ref 0.3–1)
MONOCYTES NFR BLD: 9.8 % (ref 4–15)
NEUTROPHILS # BLD AUTO: 3.9 K/UL (ref 1.8–7.7)
NEUTROPHILS NFR BLD: 66.2 % (ref 38–73)
NRBC BLD-RTO: 0 /100 WBC
PHOSPHATE SERPL-MCNC: 2.6 MG/DL (ref 2.7–4.5)
PLATELET # BLD AUTO: 140 K/UL (ref 150–450)
PMV BLD AUTO: 12.3 FL (ref 9.2–12.9)
POTASSIUM SERPL-SCNC: 3.8 MMOL/L (ref 3.5–5.1)
PROT SERPL-MCNC: 5.8 G/DL (ref 6–8.4)
RBC # BLD AUTO: 2.91 M/UL (ref 4–5.4)
SODIUM SERPL-SCNC: 130 MMOL/L (ref 136–145)
WBC # BLD AUTO: 5.9 K/UL (ref 3.9–12.7)

## 2022-05-08 PROCEDURE — 80053 COMPREHEN METABOLIC PANEL: CPT | Performed by: STUDENT IN AN ORGANIZED HEALTH CARE EDUCATION/TRAINING PROGRAM

## 2022-05-08 PROCEDURE — 25000003 PHARM REV CODE 250: Performed by: STUDENT IN AN ORGANIZED HEALTH CARE EDUCATION/TRAINING PROGRAM

## 2022-05-08 PROCEDURE — 63600175 PHARM REV CODE 636 W HCPCS: Performed by: STUDENT IN AN ORGANIZED HEALTH CARE EDUCATION/TRAINING PROGRAM

## 2022-05-08 PROCEDURE — 83605 ASSAY OF LACTIC ACID: CPT | Performed by: STUDENT IN AN ORGANIZED HEALTH CARE EDUCATION/TRAINING PROGRAM

## 2022-05-08 PROCEDURE — 99900035 HC TECH TIME PER 15 MIN (STAT)

## 2022-05-08 PROCEDURE — 99233 PR SUBSEQUENT HOSPITAL CARE,LEVL III: ICD-10-PCS | Mod: ,,, | Performed by: STUDENT IN AN ORGANIZED HEALTH CARE EDUCATION/TRAINING PROGRAM

## 2022-05-08 PROCEDURE — 83735 ASSAY OF MAGNESIUM: CPT | Performed by: STUDENT IN AN ORGANIZED HEALTH CARE EDUCATION/TRAINING PROGRAM

## 2022-05-08 PROCEDURE — G0378 HOSPITAL OBSERVATION PER HR: HCPCS

## 2022-05-08 PROCEDURE — 99233 SBSQ HOSP IP/OBS HIGH 50: CPT | Mod: ,,, | Performed by: STUDENT IN AN ORGANIZED HEALTH CARE EDUCATION/TRAINING PROGRAM

## 2022-05-08 PROCEDURE — 84100 ASSAY OF PHOSPHORUS: CPT | Performed by: STUDENT IN AN ORGANIZED HEALTH CARE EDUCATION/TRAINING PROGRAM

## 2022-05-08 PROCEDURE — 85025 COMPLETE CBC W/AUTO DIFF WBC: CPT | Performed by: STUDENT IN AN ORGANIZED HEALTH CARE EDUCATION/TRAINING PROGRAM

## 2022-05-08 PROCEDURE — 36415 COLL VENOUS BLD VENIPUNCTURE: CPT | Performed by: STUDENT IN AN ORGANIZED HEALTH CARE EDUCATION/TRAINING PROGRAM

## 2022-05-08 PROCEDURE — 63600175 PHARM REV CODE 636 W HCPCS: Performed by: INTERNAL MEDICINE

## 2022-05-08 PROCEDURE — 94761 N-INVAS EAR/PLS OXIMETRY MLT: CPT

## 2022-05-08 PROCEDURE — 25000003 PHARM REV CODE 250: Performed by: INTERNAL MEDICINE

## 2022-05-08 RX ORDER — DIPHENHYDRAMINE HCL 25 MG
25 CAPSULE ORAL EVERY 6 HOURS PRN
Status: DISCONTINUED | OUTPATIENT
Start: 2022-05-08 | End: 2022-05-14 | Stop reason: HOSPADM

## 2022-05-08 RX ORDER — MAGNESIUM SULFATE HEPTAHYDRATE 40 MG/ML
2 INJECTION, SOLUTION INTRAVENOUS ONCE
Status: COMPLETED | OUTPATIENT
Start: 2022-05-08 | End: 2022-05-08

## 2022-05-08 RX ORDER — HYDROMORPHONE HYDROCHLORIDE 1 MG/ML
0.5 INJECTION, SOLUTION INTRAMUSCULAR; INTRAVENOUS; SUBCUTANEOUS
Status: DISCONTINUED | OUTPATIENT
Start: 2022-05-08 | End: 2022-05-11

## 2022-05-08 RX ORDER — FUROSEMIDE 10 MG/ML
40 INJECTION INTRAMUSCULAR; INTRAVENOUS 2 TIMES DAILY
Status: DISCONTINUED | OUTPATIENT
Start: 2022-05-08 | End: 2022-05-10

## 2022-05-08 RX ORDER — HYDROMORPHONE HYDROCHLORIDE 1 MG/ML
1 INJECTION, SOLUTION INTRAMUSCULAR; INTRAVENOUS; SUBCUTANEOUS ONCE
Status: COMPLETED | OUTPATIENT
Start: 2022-05-08 | End: 2022-05-08

## 2022-05-08 RX ORDER — CELECOXIB 100 MG/1
100 CAPSULE ORAL 2 TIMES DAILY
Status: DISCONTINUED | OUTPATIENT
Start: 2022-05-08 | End: 2022-05-14 | Stop reason: HOSPADM

## 2022-05-08 RX ORDER — MEDROXYPROGESTERONE ACETATE 10 MG/1
20 TABLET ORAL 3 TIMES DAILY
Status: DISCONTINUED | OUTPATIENT
Start: 2022-05-08 | End: 2022-05-14 | Stop reason: HOSPADM

## 2022-05-08 RX ORDER — OXYCODONE HYDROCHLORIDE 5 MG/1
5 TABLET ORAL EVERY 4 HOURS PRN
Status: DISCONTINUED | OUTPATIENT
Start: 2022-05-08 | End: 2022-05-14 | Stop reason: HOSPADM

## 2022-05-08 RX ORDER — FUROSEMIDE 10 MG/ML
40 INJECTION INTRAMUSCULAR; INTRAVENOUS ONCE
Status: DISCONTINUED | OUTPATIENT
Start: 2022-05-08 | End: 2022-05-08

## 2022-05-08 RX ADMIN — DIPHENHYDRAMINE HYDROCHLORIDE 25 MG: 25 CAPSULE ORAL at 09:05

## 2022-05-08 RX ADMIN — CELECOXIB 100 MG: 100 CAPSULE ORAL at 10:05

## 2022-05-08 RX ADMIN — THERA TABS 1 TABLET: TAB at 09:05

## 2022-05-08 RX ADMIN — MICONAZOLE NITRATE 2 % TOPICAL POWDER: at 09:05

## 2022-05-08 RX ADMIN — METOPROLOL SUCCINATE 50 MG: 50 TABLET, EXTENDED RELEASE ORAL at 10:05

## 2022-05-08 RX ADMIN — PROPAFENONE HYDROCHLORIDE 225 MG: 150 TABLET, FILM COATED ORAL at 10:05

## 2022-05-08 RX ADMIN — HYDROMORPHONE HYDROCHLORIDE 0.5 MG: 1 INJECTION, SOLUTION INTRAMUSCULAR; INTRAVENOUS; SUBCUTANEOUS at 09:05

## 2022-05-08 RX ADMIN — VANCOMYCIN HYDROCHLORIDE 1750 MG: 10 INJECTION, POWDER, LYOPHILIZED, FOR SOLUTION INTRAVENOUS at 02:05

## 2022-05-08 RX ADMIN — POLYETHYLENE GLYCOL 3350 17 G: 17 POWDER, FOR SOLUTION ORAL at 09:05

## 2022-05-08 RX ADMIN — FUROSEMIDE 40 MG: 10 INJECTION, SOLUTION INTRAMUSCULAR; INTRAVENOUS at 10:05

## 2022-05-08 RX ADMIN — MAGNESIUM SULFATE 2 G: 2 INJECTION INTRAVENOUS at 02:05

## 2022-05-08 RX ADMIN — PANTOPRAZOLE SODIUM 40 MG: 40 TABLET, DELAYED RELEASE ORAL at 09:05

## 2022-05-08 RX ADMIN — APIXABAN 5 MG: 5 TABLET, FILM COATED ORAL at 09:05

## 2022-05-08 RX ADMIN — APIXABAN 5 MG: 5 TABLET, FILM COATED ORAL at 12:05

## 2022-05-08 RX ADMIN — LISINOPRIL 20 MG: 20 TABLET ORAL at 09:05

## 2022-05-08 RX ADMIN — PROPAFENONE HYDROCHLORIDE 225 MG: 150 TABLET, FILM COATED ORAL at 04:05

## 2022-05-08 RX ADMIN — ALPRAZOLAM 0.5 MG: 0.25 TABLET ORAL at 12:05

## 2022-05-08 RX ADMIN — MEDROXYPROGESTERONE ACETATE 20 MG: 10 TABLET ORAL at 11:05

## 2022-05-08 RX ADMIN — ALPRAZOLAM 0.5 MG: 0.25 TABLET ORAL at 09:05

## 2022-05-08 RX ADMIN — HYDROMORPHONE HYDROCHLORIDE 0.5 MG: 1 INJECTION, SOLUTION INTRAMUSCULAR; INTRAVENOUS; SUBCUTANEOUS at 03:05

## 2022-05-08 RX ADMIN — OXYCODONE 5 MG: 5 TABLET ORAL at 05:05

## 2022-05-08 RX ADMIN — PROPAFENONE HYDROCHLORIDE 225 MG: 150 TABLET, FILM COATED ORAL at 05:05

## 2022-05-08 RX ADMIN — PIPERACILLIN SODIUM AND TAZOBACTAM SODIUM 4.5 G: 4; .5 INJECTION, POWDER, LYOPHILIZED, FOR SOLUTION INTRAVENOUS at 05:05

## 2022-05-08 RX ADMIN — ATORVASTATIN CALCIUM 40 MG: 40 TABLET, FILM COATED ORAL at 09:05

## 2022-05-08 RX ADMIN — DIPHENHYDRAMINE HYDROCHLORIDE 25 MG: 25 CAPSULE ORAL at 03:05

## 2022-05-08 RX ADMIN — HYDROMORPHONE HYDROCHLORIDE 1 MG: 1 INJECTION, SOLUTION INTRAMUSCULAR; INTRAVENOUS; SUBCUTANEOUS at 10:05

## 2022-05-08 RX ADMIN — FUROSEMIDE 40 MG: 10 INJECTION, SOLUTION INTRAMUSCULAR; INTRAVENOUS at 02:05

## 2022-05-08 RX ADMIN — METOPROLOL SUCCINATE 50 MG: 50 TABLET, EXTENDED RELEASE ORAL at 09:05

## 2022-05-08 RX ADMIN — MELATONIN TAB 3 MG 6 MG: 3 TAB at 10:05

## 2022-05-08 RX ADMIN — DULOXETINE 120 MG: 60 CAPSULE, DELAYED RELEASE ORAL at 09:05

## 2022-05-08 RX ADMIN — MICONAZOLE NITRATE 2 % TOPICAL POWDER: at 12:05

## 2022-05-08 RX ADMIN — NIFEDIPINE 90 MG: 30 TABLET, FILM COATED, EXTENDED RELEASE ORAL at 09:05

## 2022-05-08 RX ADMIN — APIXABAN 5 MG: 5 TABLET, FILM COATED ORAL at 10:05

## 2022-05-08 RX ADMIN — MICONAZOLE NITRATE 2 % TOPICAL POWDER: at 10:05

## 2022-05-08 RX ADMIN — MAGNESIUM SULFATE 2 G: 2 INJECTION INTRAVENOUS at 10:05

## 2022-05-08 RX ADMIN — FUROSEMIDE 40 MG: 40 TABLET ORAL at 09:05

## 2022-05-08 NOTE — PROGRESS NOTES
[facility] - Tele[facility] Stepdown ([place] [address])  General Surgery  Progress Note    Subjective:     History of Present Illness:  57F with pre-diabetes (Hgb a1c is 5.7%), HTN, CHF, Afib presents with rash for two days on her left lower extremity. Spreading. Painful. Febrile at home to 101.    Complaint of her knee giving out this morning and knee pain. Seems mostly superficial, but hard for her to commit to exclude the joint itself having different pain today from her chronic pain.      Post-Op Info:  * No surgery found *         Interval History: Patient seen and examined this morning. Complains of worsening knee pain this morning. States that, subjectively, the erythema to the left lower extremity is improved.     Medications:  Continuous Infusions:  Scheduled Meds:   apixaban  5 mg Oral BID    atorvastatin  40 mg Oral Daily    DULoxetine  120 mg Oral Daily    furosemide  40 mg Oral BID    lisinopriL  20 mg Oral Daily    melatonin  6 mg Oral Nightly    metoprolol succinate  50 mg Oral BID    miconazole NITRATE 2 %   Topical (Top) BID    multivitamin  1 tablet Oral Daily    NIFEdipine  90 mg Oral Daily    pantoprazole  40 mg Oral Daily    piperacillin-tazobactam (ZOSYN) IVPB  4.5 g Intravenous Q8H    propafenone  225 mg Oral Q8H    vancomycin (VANCOCIN) IVPB  1,750 mg Intravenous Q12H     PRN Meds:acetaminophen, ALPRAZolam, glucagon (human recombinant), glucose, glucose, naloxone, ondansetron, oxyCODONE, polyethylene glycol, traZODone, Pharmacy to dose Vancomycin consult **AND** vancomycin - pharmacy to dose     Review of patient's allergies indicates:   Allergen Reactions    Flecainide Shortness Of Breath and Swelling     Objective:     Vital Signs (Most Recent):  Temp: 97.6 °F (36.4 °C) (05/08/22 0818)  Pulse: 86 (05/08/22 0818)  Resp: 16 (05/08/22 0818)  BP: 131/67 (05/08/22 0818)  SpO2: 98 % (05/08/22 0818) Vital Signs (24h Range):  Temp:  [97.6 °F (36.4 °C)-98.4 °F (36.9 °C)] 97.6 °F (36.4  °C)  Pulse:  [] 86  Resp:  [16-25] 16  SpO2:  [92 %-99 %] 98 %  BP: (111-133)/(61-76) 131/67     Weight: 125.7 kg (277 lb 1.9 oz)  Body mass index is 44.73 kg/m².    Intake/Output - Last 3 Shifts       None            Physical Exam  Vitals reviewed.   Cardiovascular:      Rate and Rhythm: Normal rate.      Pulses: Normal pulses.   Pulmonary:      Effort: Pulmonary effort is normal.   Abdominal:      Palpations: Abdomen is soft.   Musculoskeletal:         General: No swelling.   Skin:     General: Skin is dry.      Capillary Refill: Capillary refill takes less than 2 seconds.      Findings: Rash present.      Comments: Erythema recessing in relation to previous marking    Erythema around her left knee extending down to the superior shin  No crepitus  No drainage  No abscess   Neurological:      General: No focal deficit present.      Mental Status: She is alert and oriented to person, place, and time.   Psychiatric:         Mood and Affect: Mood normal.       Significant Labs:  I have reviewed all pertinent lab results within the past 24 hours.  CBC:   Recent Labs   Lab 05/08/22  0430   WBC 5.90   RBC 2.91*   HGB 8.1*   HCT 26.6*   *   MCV 91   MCH 27.8   MCHC 30.5*     CMP:   Recent Labs   Lab 05/08/22  0430   *   CALCIUM 8.4*   ALBUMIN 2.6*   PROT 5.8*   *   K 3.8   CO2 23   CL 98   BUN 10   CREATININE 0.9   ALKPHOS 84   ALT 13   AST 24   BILITOT 0.8       Significant Diagnostics:  I have reviewed all pertinent imaging results/findings within the past 24 hours.    Assessment/Plan:     Cellulitis of extremity  57F with cellulitis centered around left knee and extending to upper shin. Also cellulitis in her left medial thigh.    Gen surg consulted for nec fasc. No crepitus or drainage on exam. LRINEC score is 4 for lactic acid. CT shows no air, but there is some fluid around the knee.    - patient seen and examined this morning  - labs reviewed  - cellulitis improving  - continue abx  -  complains of worsening knee pain this morning-- ortho following  - rest of care per primary team  - general surgery to continue to follow          Cory Pruitt MD  General Surgery  [facility] - Telemetry Stepdown ([place])

## 2022-05-08 NOTE — SUBJECTIVE & OBJECTIVE
Past Medical History:   Diagnosis Date    Anticoagulant long-term use     Arthritis     Atrial fibrillation     cardioversion    Atrial fibrillation with RVR     Avascular necrosis     L hand    CHF (congestive heart failure)     Depression     Encounter for blood transfusion 07/22/2020    GERD (gastroesophageal reflux disease)     Hx of psychiatric care     Hypertension     Iron deficiency anemia 5/7/2022    TIBC 444 with saturated iron 8    Obese     Pre-diabetes 5/7/2022    Psychiatric problem     Sleep apnea        Past Surgical History:   Procedure Laterality Date    ABLATION OF ARRHYTHMOGENIC FOCUS FOR ATRIAL FIBRILLATION N/A 7/20/2020    Procedure: Ablation atrial fibrillation;  Surgeon: Gabriel Hawley MD;  Location: [facility] EP LAB;  Service: Cardiology;  Laterality: N/A;  afib, PVI, RFA, REENA, SHADY, anes, MB, 3 Prep    ABLATION OF ARRHYTHMOGENIC FOCUS FOR ATRIAL FIBRILLATION N/A 1/24/2022    Procedure: Ablation atrial fibrillation;  Surgeon: Gabriel Hawley MD;  Location: [facility] EP LAB;  Service: Cardiology;  Laterality: N/A;  afib/afl, PVI (re-do)/CTI, RFA, REENA (cx if SR), SHADY, anes, MB, 3 Prep    APPLICATION OF WOUND VACUUM-ASSISTED CLOSURE DEVICE Left 8/3/2020    Procedure: APPLICATION, WOUND VAC;  Surgeon: SEMAJ Márquez III, MD;  Location: [facility] OR 2ND FLR;  Service: Peripheral Vascular;  Laterality: Left;    APPLICATION OF WOUND VACUUM-ASSISTED CLOSURE DEVICE Left 8/6/2020    Procedure: APPLICATION, WOUND VAC;  Surgeon: SEMAJ Márquez III, MD;  Location: [facility] OR 2ND FLR;  Service: Peripheral Vascular;  Laterality: Left;    CARPAL TUNNEL RELEASE Right 6/10/2020    Procedure: RELEASE, CARPAL TUNNEL - RIGHT;  Surgeon: Adelaida Hall MD;  Location: [facility];  Service: Orthopedics;  Laterality: Right;  GENERAL AND REGIONAL    CARPAL TUNNEL RELEASE Left 5/5/2021    Procedure: RELEASE, CARPAL TUNNEL, LEFT;  Surgeon: Adelaida Hall MD;  Location: [facility];  Service: Orthopedics;  Laterality:  Left;  GENERAL & REGIONAL IN PACU    CLOSURE OF WOUND Left 8/6/2020    Procedure: CLOSURE, WOUND;  Surgeon: SEMAJ Márquez III, MD;  Location: [facility] OR [address] [place] [address];  Service: Peripheral Vascular;  Laterality: Left;  Complex    COLONOSCOPY N/A 8/17/2021    Procedure: COLONOSCOPY Suprep;  Surgeon: Loco Deluca MD;  Location: [facility] ENDO;  Service: Endoscopy;  Laterality: N/A;    ESOPHAGOGASTRODUODENOSCOPY N/A 8/17/2021    Procedure: EGD (ESOPHAGOGASTRODUODENOSCOPY);  Surgeon: Loco Deluca MD;  Location: [facility] ENDO;  Service: Endoscopy;  Laterality: N/A;  Patient is schedule to have her Covid test on 08/14/2021 at the [facility] @ 9:30am. [place].    EXPLORATION OF FEMORAL ARTERY Left 7/21/2020    Procedure: EXPLORATION, ARTERY, FEMORAL;  Surgeon: SEMAJ Márquez III, MD;  Location: [facility] OR [address] [place] [address];  Service: Peripheral Vascular;  Laterality: Left;  1. Urgent Direct repair, L SFA branch laceration    FOOT SURGERY      HYSTEROSCOPY WITH DILATION AND CURETTAGE OF UTERUS N/A 2/19/2022    Procedure: HYSTEROSCOPY, WITH DILATION AND CURETTAGE OF UTERUS;  Surgeon: Shane Palomo MD;  Location: [facility] OR [address] [place] [address];  Service: OB/GYN;  Laterality: N/A;    TREATMENT OF CARDIAC ARRHYTHMIA N/A 1/29/2020    Procedure: CARDIOVERSION;  Surgeon: Gabriel Hawley MD;  Location: [facility] EP LAB;  Service: Cardiology;  Laterality: N/A;  af, dmei, dccv, anes, mb, 345    TREATMENT OF CARDIAC ARRHYTHMIA  1/24/2022    Procedure: Cardioversion or Defibrillation;  Surgeon: Gabriel Hawley MD;  Location: [facility] EP LAB;  Service: Cardiology;;    WOUND DEBRIDEMENT Left 8/6/2020    Procedure: DEBRIDEMENT, WOUND;  Surgeon: SEMAJ Márquez III, MD;  Location: [facility] OR [address] [place] [address];  Service: Peripheral Vascular;  Laterality: Left;       Review of patient's allergies indicates:   Allergen Reactions    Flecainide Shortness Of Breath and Swelling       Current Facility-Administered Medications on File Prior to Encounter   Medication    sodium  chloride 0.9% bolus 1,000 mL     Current Outpatient Medications on File Prior to Encounter   Medication Sig    acetaminophen (TYLENOL) 500 MG tablet Take 2 tablets (1,000 mg) by mouth at onset of pain or headache, may repeat if needed.    ALPRAZolam (XANAX) 0.5 MG tablet Take 1 tablet (0.5 mg total) by mouth 2 (two) times daily as needed for Anxiety.    apixaban (ELIQUIS) 5 mg Tab Take 1 tablet (5 mg total) by mouth 2 (two) times daily.    atorvastatin (LIPITOR) 40 MG tablet Take 1 tablet (40 mg total) by mouth once daily.    b complex vitamins capsule Take 1 capsule by mouth once daily.    colchicine (COLCRYS) 0.6 mg tablet For gout attack: Take TWO tablets by mouth, then, 2 hours later, take ONE additional tablet. Then take 1 tablet twice daily only if still needed for gout pain. (Patient taking differently: For gout attack: Take TWO tablets by mouth, then, 2 hours later, take ONE additional tablet. Then take 1 tablet twice daily only if still needed for gout pain.)    DULoxetine (CYMBALTA) 60 MG capsule Take 2 capsules (120 mg total) by mouth once daily.    ferrous sulfate (SLOW RELEASE IRON) 142 mg (45 mg iron) TbSR Take 1 tablet (142 mg total) by mouth once daily. FOR 7 DAY THEN INCREASE TO TWICE DAILY AS TOLERATED    furosemide (LASIX) 40 MG tablet Take 1 tablet (40 mg total) by mouth 2 (two) times daily. Resume as needed for edema until followup with your PCP.    gluc-shikha-[facility]#2-J-iiqs-reymundo-bor 750 mg-644 mg- 30 mg-1 mg Tab Take 1 tablet by mouth once daily.     krill/om-3/dha/epa/phospho/ast (MEGARED OMEGA-3 KRILL OIL ORAL) Take 1 capsule by mouth once daily.    lisinopriL (PRINIVIL,ZESTRIL) 40 MG tablet Take 0.5 tablets (20 mg total) by mouth once daily.    lisinopriL (PRINIVIL,ZESTRIL) 40 MG tablet Take 1 tablet (40 mg total) by mouth once daily.    melatonin 10 mg Tab Take 1-2 tablets by mouth nightly as needed (Sleep).    metoprolol succinate (TOPROL-XL) 50 MG 24 hr tablet Take 1 tablet (50 mg total) by  mouth 2 (two) times daily.    multivitamin (THERAGRAN) per tablet Take 1 tablet by mouth once daily.    NIFEdipine (PROCARDIA-XL) 90 MG (OSM) 24 hr tablet Take 1 tablet (90 mg total) by mouth once daily. HOLD UNTIL FOLLOW-UP WITH YOUR PCP.    omeprazole (PRILOSEC) 20 MG capsule TAKE 1 CAPSULE BY MOUTH ONCE DAILY    ondansetron (ZOFRAN-ODT) 4 MG TbDL Take 2 tablets (8 mg total) by mouth every 8 (eight) hours as needed (nasuea, vomiting).    polyethylene glycol (GLYCOLAX) 17 gram PwPk Take by mouth as needed (constipation).    potassium chloride SA (K-DUR,KLOR-CON) 20 MEQ tablet Take 1 tablet (20 mEq total) by mouth once daily. ONLY TAKE WITH LASIX.    progesterone (PROMETRIUM) 200 MG capsule Take 1 capsule (200 mg total) by mouth 2 (two) times a day.    propafenone (RYTHMOL) 225 MG Tab Take 1 tablet (225 mg total) by mouth every 8 (eight) hours.    traZODone (DESYREL) 100 MG tablet Take 1 tablet (100 mg total) by mouth nightly as needed for Insomnia.    UNABLE TO FIND Take 1 tablet by mouth once daily. Trino's Leg Cramps    [DISCONTINUED] pantoprazole (PROTONIX) 40 MG tablet Take 1 tablet (40 mg total) by mouth once daily. (Patient not taking: Reported on 2022)     Family History       Problem Relation (Age of Onset)    Cataracts Mother    Diabetes Father    Hypertension Mother, Father, Sister, Brother    Thyroid disease Mother          Tobacco Use    Smoking status: Former Smoker     Types: Cigarettes     Quit date: 2019     Years since quittin.8    Smokeless tobacco: Never Used   Substance and Sexual Activity    Alcohol use: No    Drug use: No    Sexual activity: Not on file     Review of Systems   Constitutional:  Positive for activity change and fever.   HENT:  Negative for sore throat and trouble swallowing.    Eyes:  Negative for photophobia and visual disturbance.   Respiratory:  Negative for chest tightness and shortness of breath.    Cardiovascular:  Negative for chest pain, palpitations and  leg swelling.   Gastrointestinal:  Negative for abdominal distention, abdominal pain, blood in stool, constipation, diarrhea, nausea and vomiting.   Endocrine: Negative for polyphagia and polyuria.   Genitourinary:  Negative for difficulty urinating, dysuria and hematuria.   Musculoskeletal:  Positive for joint swelling.   Skin:  Positive for color change.   Allergic/Immunologic: Negative for immunocompromised state.   Neurological:  Negative for dizziness, seizures, syncope and facial asymmetry.   Psychiatric/Behavioral:  Negative for agitation, behavioral problems and confusion.    Objective:     Vital Signs (Most Recent):  Temp: 98.3 °F (36.8 °C) (05/07/22 1934)  Pulse: 105 (05/07/22 1934)  Resp: (!) 22 (05/07/22 1934)  BP: 114/76 (05/07/22 1934)  SpO2: 97 % (05/07/22 1934)   Vital Signs (24h Range):  Temp:  [98 °F (36.7 °C)-98.4 °F (36.9 °C)] 98.3 °F (36.8 °C)  Pulse:  [] 105  Resp:  [16-23] 22  SpO2:  [97 %-99 %] 97 %  BP: (114-130)/(65-76) 114/76     Weight: 130.6 kg (288 lb)  Body mass index is 46.48 kg/m².    Physical Exam  Vitals and nursing note reviewed.   Constitutional:       Appearance: She is well-developed. She is obese. She is ill-appearing. She is not toxic-appearing or diaphoretic.   HENT:      Head: Normocephalic and atraumatic.      Nose: Nose normal. No congestion.   Eyes:      General: No scleral icterus.     Pupils: Pupils are equal, round, and reactive to light.   Neck:      Thyroid: No thyromegaly.   Cardiovascular:      Rate and Rhythm: Normal rate and regular rhythm.      Heart sounds: No murmur heard.    No gallop.   Pulmonary:      Effort: Pulmonary effort is normal.      Breath sounds: Normal breath sounds. No stridor. No wheezing or rales.   Abdominal:      General: There is no distension.      Palpations: Abdomen is soft. There is no mass.      Tenderness: There is no abdominal tenderness. There is no guarding.   Musculoskeletal:         General: Swelling and tenderness  present. No signs of injury. Normal range of motion.      Cervical back: Normal range of motion and neck supple. No rigidity.      Right lower leg: No edema.      Left lower leg: Edema present.   Lymphadenopathy:      Cervical: No cervical adenopathy.   Skin:     General: Skin is warm and dry.      Capillary Refill: Capillary refill takes less than 2 seconds.      Findings: Erythema present.   Neurological:      General: No focal deficit present.      Mental Status: She is alert and oriented to person, place, and time.      Cranial Nerves: No cranial nerve deficit.      Motor: No weakness.   Psychiatric:         Mood and Affect: Mood normal.         Behavior: Behavior normal.         CRANIAL NERVES     CN III, IV, VI   Pupils are equal, round, and reactive to light.         Recent Results (from the past 24 hour(s))   Blood Culture #1 **CANNOT BE ORDERED STAT**    Collection Time: 05/07/22 12:46 PM    Specimen: Peripheral, Antecubital, Left; Blood   Result Value Ref Range    Blood Culture, Routine No Growth to date    CBC auto differential    Collection Time: 05/07/22 12:47 PM   Result Value Ref Range    WBC 8.38 3.90 - 12.70 K/uL    RBC 3.71 (L) 4.00 - 5.40 M/uL    Hemoglobin 10.2 (L) 12.0 - 16.0 g/dL    Hematocrit 31.9 (L) 37.0 - 48.5 %    MCV 86 82 - 98 fL    MCH 27.5 27.0 - 31.0 pg    MCHC 32.0 32.0 - 36.0 g/dL    RDW 17.2 (H) 11.5 - 14.5 %    Platelets 161 150 - 450 K/uL    MPV 12.2 9.2 - 12.9 fL    Immature Granulocytes 0.4 0.0 - 0.5 %    Gran # (ANC) 6.1 1.8 - 7.7 K/uL    Immature Grans (Abs) 0.03 0.00 - 0.04 K/uL    Lymph # 1.5 1.0 - 4.8 K/uL    Mono # 0.7 0.3 - 1.0 K/uL    Eos # 0.1 0.0 - 0.5 K/uL    Baso # 0.05 0.00 - 0.20 K/uL    nRBC 0 0 /100 WBC    Gran % 72.5 38.0 - 73.0 %    Lymph % 17.3 (L) 18.0 - 48.0 %    Mono % 7.9 4.0 - 15.0 %    Eosinophil % 1.3 0.0 - 8.0 %    Basophil % 0.6 0.0 - 1.9 %    Differential Method Automated    Comprehensive metabolic panel    Collection Time: 05/07/22 12:47 PM    Result Value Ref Range    Sodium 135 (L) 136 - 145 mmol/L    Potassium 5.2 (H) 3.5 - 5.1 mmol/L    Chloride 101 95 - 110 mmol/L    CO2 19 (L) 23 - 29 mmol/L    Glucose 141 (H) 70 - 110 mg/dL    BUN 9 6 - 20 mg/dL    Creatinine 0.9 0.5 - 1.4 mg/dL    Calcium 9.1 8.7 - 10.5 mg/dL    Total Protein 7.7 6.0 - 8.4 g/dL    Albumin 3.3 (L) 3.5 - 5.2 g/dL    Total Bilirubin 0.8 0.1 - 1.0 mg/dL    Alkaline Phosphatase 98 55 - 135 U/L    AST 47 (H) 10 - 40 U/L    ALT 17 10 - 44 U/L    Anion Gap 15 8 - 16 mmol/L    eGFR if African American >60.0 >60 mL/min/1.73 m^2    eGFR if non African American >60.0 >60 mL/min/1.73 m^2   Iron and TIBC    Collection Time: 05/07/22 12:47 PM   Result Value Ref Range    Iron 34 30 - 160 ug/dL    Transferrin 300 200 - 375 mg/dL    TIBC 444 250 - 450 ug/dL    Saturated Iron 8 (L) 20 - 50 %   Lactic acid, plasma    Collection Time: 05/07/22 12:47 PM   Result Value Ref Range    Lactate (Lactic Acid) 3.8 (HH) 0.5 - 2.2 mmol/L   C-Reactive Protein    Collection Time: 05/07/22 12:47 PM   Result Value Ref Range    .9 (H) 0.0 - 8.2 mg/L   Blood Culture #2 **CANNOT BE ORDERED STAT**    Collection Time: 05/07/22 12:58 PM    Specimen: Peripheral, Antecubital, Right; Blood   Result Value Ref Range    Blood Culture, Routine No Growth to date    Hemoglobin A1C    Collection Time: 05/07/22  3:43 PM   Result Value Ref Range    Hemoglobin A1C 5.7 (H) 4.0 - 5.6 %    Estimated Avg Glucose 117 68 - 131 mg/dL       Microbiology Results (last 7 days)       Procedure Component Value Units Date/Time    Blood Culture #1 **CANNOT BE ORDERED STAT** [[id number]] Collected: 05/07/22 1246    Order Status: Completed Specimen: Blood from Peripheral, Antecubital, Left Updated: 05/07/22 1915     Blood Culture, Routine No Growth to date    Blood Culture #2 **CANNOT BE ORDERED STAT** [[id number]] Collected: 05/07/22 1258    Order Status: Completed Specimen: Blood from Peripheral, Antecubital, Right Updated: 05/07/22 1915      Blood Culture, Routine No Growth to date             Imaging Results               CT Thigh With Contrast Left (Final result)  Result time 05/07/22 19:04:28      Final result by Dre Real MD (05/07/22 19:04:28)                   Impression:      Findings concerning for left lower extremity cellulitis.    Knee joint effusion with some peripheral enhancement which could represent sterile effusion and nonspecific reactive synovitis; however, septic arthritis not excluded.  Additionally, there is a prepatellar small rim enhancing collection favored to represent nonspecific bursitis, although small abscess not excluded in the setting of surrounding cellulitis.  Clinical correlation advised.  Fluid sampling may be warranted.    Mild degenerative changes of the lower extremity as detailed above.    3.8 cm incidental left adnexal cyst.  For a patient of the this age group pelvic ultrasound follow-up in 6-12 months is recommended per ACR guidelines, or sooner if associated pain develops.    Suspected leiomyomatous uterus.  Further evaluation with elective/nonemergent pelvic ultrasound can be obtained as warranted.    Additional findings above.    This report was flagged in Epic as abnormal.    Electronically signed by resident: Ramon Hammonds  Date:    05/07/2022  Time:    18:26    Electronically signed by: Dre Real MD  Date:    05/07/2022  Time:    19:04               Narrative:    EXAMINATION:  CT THIGH WITH CONTRAST LEFT; CT LEG (TIBIA-FIBULA) WITH CONTRAST LEFT    CLINICAL HISTORY:  Soft tissue infection suspected, thigh, xray done;; Soft tissue infection suspected, lower leg, xray done;    TECHNIQUE:  Axial images performed through the iliac crests to the foot of the left lower extremity after administration of 100 mL of intravenous Omnipaque 350.  Coronal and sagittal reformations performed.    COMPARISON:  Lower extremity veins ultrasound 05/07/2022    FINDINGS:  The bones of the left lower extremity  demonstrate mild degenerative changes of the left knee indicated by subchondral sclerosis, osteophytic formation, and mild joint space narrowing.  Degenerative changes of the 1st metatarsophalangeal joint is noted as well.  No acute fracture.  No osseous destructive lesions.  Small suprapatellar and femorotibial joint effusions with some peripheral enhancement.    The soft tissues of the left lower extremity demonstrate multiple areas of soft tissue attenuation with peripheral calcification in the subcutaneous soft tissues overlying the lateral left thigh, perhaps relating to remote trauma or injection medication use.  Left inguinal skin thickening, likely postsurgical related to prior left superficial femoral artery branch laceration repair.  There is left inguinal lymphadenopathy, for example lymph node measuring 1.2 cm in short axis (series 301, image 845). Septation of the subcutaneous fat involving the left lower extremity mainly in the lower leg, indicating edema.  Mild skin thickening overlying the left lateral lower leg.  There is approximate 2.8 x 1.2 x 2.8 cm slightly heterogeneous collection within enhancing rim along the anterior mid to lower aspect of the patella.    Limited intrapelvic evaluation demonstrates enlargement of the uterus with punctate internal calcifications, likely leiomyomatous uterus.  3.8 cm left adnexal hypodensity most consistent with a cyst.                                        CT Leg (Tibia-Fibula) Wtih Contrast Left (Final result)  Result time 05/07/22 19:04:28      Final result by Dre Real MD (05/07/22 19:04:28)                   Impression:      Findings concerning for left lower extremity cellulitis.    Knee joint effusion with some peripheral enhancement which could represent sterile effusion and nonspecific reactive synovitis; however, septic arthritis not excluded.  Additionally, there is a prepatellar small rim enhancing collection favored to represent nonspecific  bursitis, although small abscess not excluded in the setting of surrounding cellulitis.  Clinical correlation advised.  Fluid sampling may be warranted.    Mild degenerative changes of the lower extremity as detailed above.    3.8 cm incidental left adnexal cyst.  For a patient of the this age group pelvic ultrasound follow-up in 6-12 months is recommended per ACR guidelines, or sooner if associated pain develops.    Suspected leiomyomatous uterus.  Further evaluation with elective/nonemergent pelvic ultrasound can be obtained as warranted.    Additional findings above.    This report was flagged in Epic as abnormal.    Electronically signed by resident: Ramon Hammonds  Date:    05/07/2022  Time:    18:26    Electronically signed by: Dre Real MD  Date:    05/07/2022  Time:    19:04               Narrative:    EXAMINATION:  CT THIGH WITH CONTRAST LEFT; CT LEG (TIBIA-FIBULA) WITH CONTRAST LEFT    CLINICAL HISTORY:  Soft tissue infection suspected, thigh, xray done;; Soft tissue infection suspected, lower leg, xray done;    TECHNIQUE:  Axial images performed through the iliac crests to the foot of the left lower extremity after administration of 100 mL of intravenous Omnipaque 350.  Coronal and sagittal reformations performed.    COMPARISON:  Lower extremity veins ultrasound 05/07/2022    FINDINGS:  The bones of the left lower extremity demonstrate mild degenerative changes of the left knee indicated by subchondral sclerosis, osteophytic formation, and mild joint space narrowing.  Degenerative changes of the 1st metatarsophalangeal joint is noted as well.  No acute fracture.  No osseous destructive lesions.  Small suprapatellar and femorotibial joint effusions with some peripheral enhancement.    The soft tissues of the left lower extremity demonstrate multiple areas of soft tissue attenuation with peripheral calcification in the subcutaneous soft tissues overlying the lateral left thigh, perhaps relating to  remote trauma or injection medication use.  Left inguinal skin thickening, likely postsurgical related to prior left superficial femoral artery branch laceration repair.  There is left inguinal lymphadenopathy, for example lymph node measuring 1.2 cm in short axis (series 301, image 845). Septation of the subcutaneous fat involving the left lower extremity mainly in the lower leg, indicating edema.  Mild skin thickening overlying the left lateral lower leg.  There is approximate 2.8 x 1.2 x 2.8 cm slightly heterogeneous collection within enhancing rim along the anterior mid to lower aspect of the patella.    Limited intrapelvic evaluation demonstrates enlargement of the uterus with punctate internal calcifications, likely leiomyomatous uterus.  3.8 cm left adnexal hypodensity most consistent with a cyst.                                       US Lower Extremity Veins Left (Final result)  Result time 05/07/22 14:23:00      Final result by Dre Real MD (05/07/22 14:23:00)                   Impression:      No evidence of deep venous thrombosis in the left lower extremity.      Electronically signed by: Dre Real MD  Date:    05/07/2022  Time:    14:23               Narrative:    EXAMINATION:  US LOWER EXTREMITY VEINS LEFT    CLINICAL HISTORY:  Other specified soft tissue disorders    TECHNIQUE:  Duplex and color flow Doppler evaluation and graded compression of the left lower extremity veins was performed.    COMPARISON:  None    FINDINGS:  Left thigh veins: The common femoral, femoral, popliteal, upper greater saphenous, and deep femoral veins are patent and free of thrombus. The veins are normally compressible and have normal phasic flow and augmentation response.    Left calf veins: The visualized calf veins are patent.    Contralateral CFV: The contralateral (right) common femoral vein is patent and free of thrombus.    Miscellaneous: Nonspecific subcutaneous edema at the left calf region.                                        X-Ray Tibia Fibula 2 View Left (Final result)  Result time 05/07/22 13:57:38      Final result by Joshua Fritz MD (05/07/22 13:57:38)                   Impression:      As above.      Electronically signed by: Joshua Fritz  Date:    05/07/2022  Time:    13:57               Narrative:    EXAMINATION:  XR TIBIA FIBULA 2 VIEW LEFT    CLINICAL HISTORY:  Pain in leg, unspecified    TECHNIQUE:  AP and lateral views of the left tibia and fibula were performed.    COMPARISON:  None.    FINDINGS:  No acute fracture, dislocation or osseous destruction.  No radiopaque foreign body.  Large calcaneal enthesophyte at the distal Achilles insertion.  Partial visualized femorotibial degenerative change.

## 2022-05-08 NOTE — HPI
57F with pre-diabetes (Hgb a1c is 5.7%), HTN, CHF, Afib presents with rash for two days on her left lower extremity. Spreading. Painful. Febrile at home to 101.    Complaint of her knee giving out this morning and knee pain. Seems mostly superficial, but hard for her to commit to exclude the joint itself having different pain today from her chronic pain.

## 2022-05-08 NOTE — ASSESSMENT & PLAN NOTE
New diagnosis of prediabetes  Consider metoprolol at discharge, as can delay diabetes by 3 years  Statin  Consider bariatric surgery

## 2022-05-08 NOTE — SUBJECTIVE & OBJECTIVE
NAEON. Denies SOB or CP. Eating okay. UOP wnl. BM wnl.   Overall pain and swelling improving, but complains of worse knee pain.     Review of Systems   Constitutional:  Positive for activity change and fever.   HENT:  Negative for sore throat and trouble swallowing.    Eyes:  Negative for photophobia and visual disturbance.   Respiratory:  Negative for chest tightness and shortness of breath.    Cardiovascular:  Negative for chest pain, palpitations and leg swelling.   Gastrointestinal:  Negative for abdominal distention, abdominal pain, blood in stool, constipation, diarrhea, nausea and vomiting.   Endocrine: Negative for polyphagia and polyuria.   Genitourinary:  Negative for difficulty urinating, dysuria and hematuria.   Musculoskeletal:  Positive for joint swelling.   Skin:  Positive for color change.   Allergic/Immunologic: Negative for immunocompromised state.   Neurological:  Negative for dizziness, seizures, syncope and facial asymmetry.   Psychiatric/Behavioral:  Negative for agitation, behavioral problems and confusion.    Objective:     Vital Signs (Most Recent):  Temp: 98 °F (36.7 °C) (05/08/22 1606)  Pulse: 84 (05/08/22 1606)  Resp: 16 (05/08/22 1606)  BP: (!) 110/55 (05/08/22 1606)  SpO2: 96 % (05/08/22 1606)   Vital Signs (24h Range):  Temp:  [97.6 °F (36.4 °C)-98.4 °F (36.9 °C)] 98 °F (36.7 °C)  Pulse:  [] 84  Resp:  [16-25] 16  SpO2:  [92 %-98 %] 96 %  BP: (110-133)/(55-76) 110/55     Weight: 125.7 kg (277 lb 1.9 oz)  Body mass index is 44.73 kg/m².    Physical Exam  Vitals and nursing note reviewed.   Constitutional:       Appearance: She is well-developed. She is obese. She is ill-appearing. She is not toxic-appearing or diaphoretic.   HENT:      Head: Normocephalic and atraumatic.      Nose: Nose normal. No congestion.   Eyes:      General: No scleral icterus.     Pupils: Pupils are equal, round, and reactive to light.   Neck:      Thyroid: No thyromegaly.   Cardiovascular:      Rate and  Rhythm: Normal rate and regular rhythm.      Heart sounds: No murmur heard.    No gallop.   Pulmonary:      Effort: Pulmonary effort is normal.      Breath sounds: Normal breath sounds. No stridor. No wheezing or rales.   Abdominal:      General: There is no distension.      Palpations: Abdomen is soft. There is no mass.      Tenderness: There is no abdominal tenderness. There is no guarding.   Musculoskeletal:         General: Swelling and tenderness present. No signs of injury. Normal range of motion.      Cervical back: Normal range of motion and neck supple. No rigidity.      Right lower leg: No edema.      Left lower leg: Edema present.   Lymphadenopathy:      Cervical: No cervical adenopathy.   Skin:     General: Skin is warm and dry.      Capillary Refill: Capillary refill takes less than 2 seconds.      Findings: Erythema present.   Neurological:      General: No focal deficit present.      Mental Status: She is alert and oriented to person, place, and time.      Cranial Nerves: No cranial nerve deficit.      Motor: No weakness.   Psychiatric:         Mood and Affect: Mood normal.         Behavior: Behavior normal.         CRANIAL NERVES     CN III, IV, VI   Pupils are equal, round, and reactive to light.         Recent Results (from the past 24 hour(s))   ISTAT PROCEDURE    Collection Time: 05/07/22  8:36 PM   Result Value Ref Range    POC PH 7.409 7.35 - 7.45    POC PCO2 42.1 35 - 45 mmHg    POC PO2 55 40 - 60 mmHg    POC HCO3 26.6 24 - 28 mmol/L    POC BE 2 -2 to 2 mmol/L    POC SATURATED O2 88 (L) 95 - 100 %    POC TCO2 28 24 - 29 mmol/L    Sample VENOUS     Site Other     Allens Test N/A     DelSys Room Air     Mode SPONT    Lactic Acid, Plasma    Collection Time: 05/07/22  8:46 PM   Result Value Ref Range    Lactate (Lactic Acid) 2.6 (H) 0.5 - 2.2 mmol/L   Lactic Acid, Plasma    Collection Time: 05/08/22 12:14 AM   Result Value Ref Range    Lactate (Lactic Acid) 1.4 0.5 - 2.2 mmol/L   CBC Auto  Differential    Collection Time: 05/08/22  4:30 AM   Result Value Ref Range    WBC 5.90 3.90 - 12.70 K/uL    RBC 2.91 (L) 4.00 - 5.40 M/uL    Hemoglobin 8.1 (L) 12.0 - 16.0 g/dL    Hematocrit 26.6 (L) 37.0 - 48.5 %    MCV 91 82 - 98 fL    MCH 27.8 27.0 - 31.0 pg    MCHC 30.5 (L) 32.0 - 36.0 g/dL    RDW 17.0 (H) 11.5 - 14.5 %    Platelets 140 (L) 150 - 450 K/uL    MPV 12.3 9.2 - 12.9 fL    Immature Granulocytes 0.3 0.0 - 0.5 %    Gran # (ANC) 3.9 1.8 - 7.7 K/uL    Immature Grans (Abs) 0.02 0.00 - 0.04 K/uL    Lymph # 1.1 1.0 - 4.8 K/uL    Mono # 0.6 0.3 - 1.0 K/uL    Eos # 0.3 0.0 - 0.5 K/uL    Baso # 0.05 0.00 - 0.20 K/uL    nRBC 0 0 /100 WBC    Gran % 66.2 38.0 - 73.0 %    Lymph % 17.8 (L) 18.0 - 48.0 %    Mono % 9.8 4.0 - 15.0 %    Eosinophil % 5.1 0.0 - 8.0 %    Basophil % 0.8 0.0 - 1.9 %    Differential Method Automated    Comprehensive Metabolic Panel    Collection Time: 05/08/22  4:30 AM   Result Value Ref Range    Sodium 130 (L) 136 - 145 mmol/L    Potassium 3.8 3.5 - 5.1 mmol/L    Chloride 98 95 - 110 mmol/L    CO2 23 23 - 29 mmol/L    Glucose 156 (H) 70 - 110 mg/dL    BUN 10 6 - 20 mg/dL    Creatinine 0.9 0.5 - 1.4 mg/dL    Calcium 8.4 (L) 8.7 - 10.5 mg/dL    Total Protein 5.8 (L) 6.0 - 8.4 g/dL    Albumin 2.6 (L) 3.5 - 5.2 g/dL    Total Bilirubin 0.8 0.1 - 1.0 mg/dL    Alkaline Phosphatase 84 55 - 135 U/L    AST 24 10 - 40 U/L    ALT 13 10 - 44 U/L    Anion Gap 9 8 - 16 mmol/L    eGFR if African American >60.0 >60 mL/min/1.73 m^2    eGFR if non African American >60.0 >60 mL/min/1.73 m^2   Magnesium    Collection Time: 05/08/22  4:30 AM   Result Value Ref Range    Magnesium 1.3 (L) 1.6 - 2.6 mg/dL   Phosphorus    Collection Time: 05/08/22  4:30 AM   Result Value Ref Range    Phosphorus 2.6 (L) 2.7 - 4.5 mg/dL       Microbiology Results (last 7 days)       Procedure Component Value Units Date/Time    Blood Culture #1 **CANNOT BE ORDERED STAT** [[id number]] Collected: 05/07/22 1246    Order Status: Completed  Specimen: Blood from Peripheral, Antecubital, Left Updated: 05/08/22 1412     Blood Culture, Routine No Growth to date      No Growth to date    Blood Culture #2 **CANNOT BE ORDERED STAT** [[id number]] Collected: 05/07/22 1258    Order Status: Completed Specimen: Blood from Peripheral, Antecubital, Right Updated: 05/08/22 1412     Blood Culture, Routine No Growth to date      No Growth to date             Imaging Results              X-Ray Knee 3 View Left (Final result)  Result time 05/08/22 01:13:57      Final result by Timur Sanabria MD (05/08/22 01:13:57)                   Impression:      As above.      Electronically signed by: Timur Sanabria MD  Date:    05/08/2022  Time:    01:13               Narrative:    EXAMINATION:  XR KNEE 3 VIEW LEFT    CLINICAL HISTORY:  postop;    TECHNIQUE:  AP, lateral, and Merchant views of the left knee were performed.    COMPARISON:  05/07/2022 left tibia and fibula.    FINDINGS:  Suboptimal positioning on the AP view with the knee appears to be in flexion.  Joint space is not adequately assessed.    No displaced fracture or dislocation.  Suboptimal positioning on AP view results in limited assessment of the tibiofemoral joint space and tibial plateau.  Suprapatellar effusion present.  Diffuse soft tissue edema present.                                        CT Thigh With Contrast Left (Final result)  Result time 05/07/22 19:04:28      Final result by Dre Real MD (05/07/22 19:04:28)                   Impression:      Findings concerning for left lower extremity cellulitis.    Knee joint effusion with some peripheral enhancement which could represent sterile effusion and nonspecific reactive synovitis; however, septic arthritis not excluded.  Additionally, there is a prepatellar small rim enhancing collection favored to represent nonspecific bursitis, although small abscess not excluded in the setting of surrounding cellulitis.  Clinical correlation advised.  Fluid  sampling may be warranted.    Mild degenerative changes of the lower extremity as detailed above.    3.8 cm incidental left adnexal cyst.  For a patient of the this age group pelvic ultrasound follow-up in 6-12 months is recommended per ACR guidelines, or sooner if associated pain develops.    Suspected leiomyomatous uterus.  Further evaluation with elective/nonemergent pelvic ultrasound can be obtained as warranted.    Additional findings above.    This report was flagged in Epic as abnormal.    Electronically signed by resident: Ramon Hammonds  Date:    05/07/2022  Time:    18:26    Electronically signed by: Dre Real MD  Date:    05/07/2022  Time:    19:04               Narrative:    EXAMINATION:  CT THIGH WITH CONTRAST LEFT; CT LEG (TIBIA-FIBULA) WITH CONTRAST LEFT    CLINICAL HISTORY:  Soft tissue infection suspected, thigh, xray done;; Soft tissue infection suspected, lower leg, xray done;    TECHNIQUE:  Axial images performed through the iliac crests to the foot of the left lower extremity after administration of 100 mL of intravenous Omnipaque 350.  Coronal and sagittal reformations performed.    COMPARISON:  Lower extremity veins ultrasound 05/07/2022    FINDINGS:  The bones of the left lower extremity demonstrate mild degenerative changes of the left knee indicated by subchondral sclerosis, osteophytic formation, and mild joint space narrowing.  Degenerative changes of the 1st metatarsophalangeal joint is noted as well.  No acute fracture.  No osseous destructive lesions.  Small suprapatellar and femorotibial joint effusions with some peripheral enhancement.    The soft tissues of the left lower extremity demonstrate multiple areas of soft tissue attenuation with peripheral calcification in the subcutaneous soft tissues overlying the lateral left thigh, perhaps relating to remote trauma or injection medication use.  Left inguinal skin thickening, likely postsurgical related to prior left superficial  femoral artery branch laceration repair.  There is left inguinal lymphadenopathy, for example lymph node measuring 1.2 cm in short axis (series 301, image 845). Septation of the subcutaneous fat involving the left lower extremity mainly in the lower leg, indicating edema.  Mild skin thickening overlying the left lateral lower leg.  There is approximate 2.8 x 1.2 x 2.8 cm slightly heterogeneous collection within enhancing rim along the anterior mid to lower aspect of the patella.    Limited intrapelvic evaluation demonstrates enlargement of the uterus with punctate internal calcifications, likely leiomyomatous uterus.  3.8 cm left adnexal hypodensity most consistent with a cyst.                                        CT Leg (Tibia-Fibula) Wtih Contrast Left (Final result)  Result time 05/07/22 19:04:28      Final result by Dre Real MD (05/07/22 19:04:28)                   Impression:      Findings concerning for left lower extremity cellulitis.    Knee joint effusion with some peripheral enhancement which could represent sterile effusion and nonspecific reactive synovitis; however, septic arthritis not excluded.  Additionally, there is a prepatellar small rim enhancing collection favored to represent nonspecific bursitis, although small abscess not excluded in the setting of surrounding cellulitis.  Clinical correlation advised.  Fluid sampling may be warranted.    Mild degenerative changes of the lower extremity as detailed above.    3.8 cm incidental left adnexal cyst.  For a patient of the this age group pelvic ultrasound follow-up in 6-12 months is recommended per ACR guidelines, or sooner if associated pain develops.    Suspected leiomyomatous uterus.  Further evaluation with elective/nonemergent pelvic ultrasound can be obtained as warranted.    Additional findings above.    This report was flagged in Epic as abnormal.    Electronically signed by resident: Ramon  Cassius  Date:    05/07/2022  Time:    18:26    Electronically signed by: Dre Real MD  Date:    05/07/2022  Time:    19:04               Narrative:    EXAMINATION:  CT THIGH WITH CONTRAST LEFT; CT LEG (TIBIA-FIBULA) WITH CONTRAST LEFT    CLINICAL HISTORY:  Soft tissue infection suspected, thigh, xray done;; Soft tissue infection suspected, lower leg, xray done;    TECHNIQUE:  Axial images performed through the iliac crests to the foot of the left lower extremity after administration of 100 mL of intravenous Omnipaque 350.  Coronal and sagittal reformations performed.    COMPARISON:  Lower extremity veins ultrasound 05/07/2022    FINDINGS:  The bones of the left lower extremity demonstrate mild degenerative changes of the left knee indicated by subchondral sclerosis, osteophytic formation, and mild joint space narrowing.  Degenerative changes of the 1st metatarsophalangeal joint is noted as well.  No acute fracture.  No osseous destructive lesions.  Small suprapatellar and femorotibial joint effusions with some peripheral enhancement.    The soft tissues of the left lower extremity demonstrate multiple areas of soft tissue attenuation with peripheral calcification in the subcutaneous soft tissues overlying the lateral left thigh, perhaps relating to remote trauma or injection medication use.  Left inguinal skin thickening, likely postsurgical related to prior left superficial femoral artery branch laceration repair.  There is left inguinal lymphadenopathy, for example lymph node measuring 1.2 cm in short axis (series 301, image 845). Septation of the subcutaneous fat involving the left lower extremity mainly in the lower leg, indicating edema.  Mild skin thickening overlying the left lateral lower leg.  There is approximate 2.8 x 1.2 x 2.8 cm slightly heterogeneous collection within enhancing rim along the anterior mid to lower aspect of the patella.    Limited intrapelvic evaluation demonstrates enlargement of  the uterus with punctate internal calcifications, likely leiomyomatous uterus.  3.8 cm left adnexal hypodensity most consistent with a cyst.                                       US Lower Extremity Veins Left (Final result)  Result time 05/07/22 14:23:00      Final result by Dre Real MD (05/07/22 14:23:00)                   Impression:      No evidence of deep venous thrombosis in the left lower extremity.      Electronically signed by: Dre Real MD  Date:    05/07/2022  Time:    14:23               Narrative:    EXAMINATION:  US LOWER EXTREMITY VEINS LEFT    CLINICAL HISTORY:  Other specified soft tissue disorders    TECHNIQUE:  Duplex and color flow Doppler evaluation and graded compression of the left lower extremity veins was performed.    COMPARISON:  None    FINDINGS:  Left thigh veins: The common femoral, femoral, popliteal, upper greater saphenous, and deep femoral veins are patent and free of thrombus. The veins are normally compressible and have normal phasic flow and augmentation response.    Left calf veins: The visualized calf veins are patent.    Contralateral CFV: The contralateral (right) common femoral vein is patent and free of thrombus.    Miscellaneous: Nonspecific subcutaneous edema at the left calf region.                                       X-Ray Tibia Fibula 2 View Left (Final result)  Result time 05/07/22 13:57:38      Final result by Joshua Fritz MD (05/07/22 13:57:38)                   Impression:      As above.      Electronically signed by: Joshua Fritz  Date:    05/07/2022  Time:    13:57               Narrative:    EXAMINATION:  XR TIBIA FIBULA 2 VIEW LEFT    CLINICAL HISTORY:  Pain in leg, unspecified    TECHNIQUE:  AP and lateral views of the left tibia and fibula were performed.    COMPARISON:  None.    FINDINGS:  No acute fracture, dislocation or osseous destruction.  No radiopaque foreign body.  Large calcaneal enthesophyte at the distal Achilles insertion.   Partial visualized femorotibial degenerative change.

## 2022-05-08 NOTE — PLAN OF CARE
Patient alert x 4 ,  ambulates to bathroom without difficulty.  Pain managed with medicines. Tolerated well IV antibiotics . Slept without distress using Home CPAP machine.    Problem: Adult Inpatient Plan of Care  Goal: Patient-Specific Goal (Individualized)  Outcome: Ongoing, Progressing     Problem: Adult Inpatient Plan of Care  Goal: Optimal Comfort and Wellbeing  Outcome: Ongoing, Progressing

## 2022-05-08 NOTE — HOSPITAL COURSE
Admitted with Cellulitis and LA of 3.8, , . Erythema, Swelling and tenderness reducing, except her knee which she states hurts worse today. Continuing IV abx, concern about going through cellulitis to bursa and may seed it (if not primary cause), will discuss with ortho. Erythema resolving. Still with pain and a bit of redness around knee, ortho will check tomorrow to make sure still improving. Still +2 PE on RLE and +1 PE on LLE, will continue lasix, reduce BP meds for now. ERythema improving however patient still with a lot of pain and some edema.  Orthopedics saw patient in canceled case due to improvement recommend Bactrim

## 2022-05-08 NOTE — PROGRESS NOTES
Pharmacokinetic Initial Assessment: IV Vancomycin    Assessment/Plan:    Initiate intravenous vancomycin with loading dose of 2000 mg once, done in ED, followed by a maintenance dose of vancomycin 1750 mg IV every 12 hours.  Desired empiric serum trough concentration is 10 to 20 mcg/mL.  Draw vancomycin trough level 60 min prior to fourth dose on 05/09/2022 at 0200.  Pharmacy will continue to follow and monitor vancomycin.      Please contact pharmacy at extension [phone] with any questions regarding this assessment.     Thank you for the consult,   Evette Land       Patient brief summary:  Vicky Alvarado is a 57 y.o. female initiated on antimicrobial therapy with IV Vancomycin for treatment of suspected skin & soft tissue infection.    Drug Allergies:   Review of patient's allergies indicates:   Allergen Reactions    Flecainide Shortness Of Breath and Swelling       Actual Body Weight:   130.6 kg    Renal Function:   Estimated Creatinine Clearance: 94.4 mL/min (based on SCr of 0.9 mg/dL).     CBC (last 72 hours):  Recent Labs   Lab Result Units 05/07/22  1247 05/07/22  1543   WBC K/uL 8.38  --    Hemoglobin g/dL 10.2*  --    Hemoglobin A1C %  --  5.7*   Hematocrit % 31.9*  --    Platelets K/uL 161  --    Gran % % 72.5  --    Lymph % % 17.3*  --    Mono % % 7.9  --    Eosinophil % % 1.3  --    Basophil % % 0.6  --    Differential Method  Automated  --        Metabolic Panel (last 72 hours):  Recent Labs   Lab Result Units 05/07/22  1247   Sodium mmol/L 135*   Potassium mmol/L 5.2*   Chloride mmol/L 101   CO2 mmol/L 19*   Glucose mg/dL 141*   BUN mg/dL 9   Creatinine mg/dL 0.9   Albumin g/dL 3.3*   Total Bilirubin mg/dL 0.8   Alkaline Phosphatase U/L 98   AST U/L 47*   ALT U/L 17       Drug levels (last 3 results):  No results for input(s): VANCOMYCINRA, VANCOMYCINPE, VANCOMYCINTR in the last 72 hours.    Microbiologic Results:  Microbiology Results (last 7 days)     Procedure Component Value Units  Date/Time    Blood Culture #1 **CANNOT BE ORDERED STAT** [[id number]] Collected: 05/07/22 1246    Order Status: Completed Specimen: Blood from Peripheral, Antecubital, Left Updated: 05/07/22 1915     Blood Culture, Routine No Growth to date    Blood Culture #2 **CANNOT BE ORDERED STAT** [[id number]] Collected: 05/07/22 1258    Order Status: Completed Specimen: Blood from Peripheral, Antecubital, Right Updated: 05/07/22 1915     Blood Culture, Routine No Growth to date

## 2022-05-08 NOTE — SUBJECTIVE & OBJECTIVE
Interval History: Patient seen and examined this morning. Complains of worsening knee pain this morning. States that, subjectively, the erythema to the left lower extremity is improved.     Medications:  Continuous Infusions:  Scheduled Meds:   apixaban  5 mg Oral BID    atorvastatin  40 mg Oral Daily    DULoxetine  120 mg Oral Daily    furosemide  40 mg Oral BID    lisinopriL  20 mg Oral Daily    melatonin  6 mg Oral Nightly    metoprolol succinate  50 mg Oral BID    miconazole NITRATE 2 %   Topical (Top) BID    multivitamin  1 tablet Oral Daily    NIFEdipine  90 mg Oral Daily    pantoprazole  40 mg Oral Daily    piperacillin-tazobactam (ZOSYN) IVPB  4.5 g Intravenous Q8H    propafenone  225 mg Oral Q8H    vancomycin (VANCOCIN) IVPB  1,750 mg Intravenous Q12H     PRN Meds:acetaminophen, ALPRAZolam, glucagon (human recombinant), glucose, glucose, naloxone, ondansetron, oxyCODONE, polyethylene glycol, traZODone, Pharmacy to dose Vancomycin consult **AND** vancomycin - pharmacy to dose     Review of patient's allergies indicates:   Allergen Reactions    Flecainide Shortness Of Breath and Swelling     Objective:     Vital Signs (Most Recent):  Temp: 97.6 °F (36.4 °C) (05/08/22 0818)  Pulse: 86 (05/08/22 0818)  Resp: 16 (05/08/22 0818)  BP: 131/67 (05/08/22 0818)  SpO2: 98 % (05/08/22 0818) Vital Signs (24h Range):  Temp:  [97.6 °F (36.4 °C)-98.4 °F (36.9 °C)] 97.6 °F (36.4 °C)  Pulse:  [] 86  Resp:  [16-25] 16  SpO2:  [92 %-99 %] 98 %  BP: (111-133)/(61-76) 131/67     Weight: 125.7 kg (277 lb 1.9 oz)  Body mass index is 44.73 kg/m².    Intake/Output - Last 3 Shifts       None            Physical Exam  Vitals reviewed.   Cardiovascular:      Rate and Rhythm: Normal rate.      Pulses: Normal pulses.   Pulmonary:      Effort: Pulmonary effort is normal.   Abdominal:      Palpations: Abdomen is soft.   Musculoskeletal:         General: No swelling.   Skin:     General: Skin is dry.      Capillary Refill: Capillary  refill takes less than 2 seconds.      Findings: Rash present.      Comments: Erythema recessing in relation to previous marking    Erythema around her left knee extending down to the superior shin  No crepitus  No drainage  No abscess   Neurological:      General: No focal deficit present.      Mental Status: She is alert and oriented to person, place, and time.   Psychiatric:         Mood and Affect: Mood normal.       Significant Labs:  I have reviewed all pertinent lab results within the past 24 hours.  CBC:   Recent Labs   Lab 05/08/22  0430   WBC 5.90   RBC 2.91*   HGB 8.1*   HCT 26.6*   *   MCV 91   MCH 27.8   MCHC 30.5*     CMP:   Recent Labs   Lab 05/08/22  0430   *   CALCIUM 8.4*   ALBUMIN 2.6*   PROT 5.8*   *   K 3.8   CO2 23   CL 98   BUN 10   CREATININE 0.9   ALKPHOS 84   ALT 13   AST 24   BILITOT 0.8       Significant Diagnostics:  I have reviewed all pertinent imaging results/findings within the past 24 hours.

## 2022-05-08 NOTE — ASSESSMENT & PLAN NOTE
Body mass index is 46.48 kg/m². Morbid obesity complicates all aspects of disease management from diagnostic modalities to treatment. Weight loss encouraged and health benefits explained to patient.   -Given DM and Cardiac disease, consider bariatric surgery

## 2022-05-08 NOTE — ASSESSMENT & PLAN NOTE
Well controlled. A1c 5.7  Consider metoprolol at discharge if not on  Statin  Consider bariatric surgery

## 2022-05-08 NOTE — ASSESSMENT & PLAN NOTE
3/4 SIRS with 101F, , RR 22, with normal WBC#.   She has lactic acidosis of 3.8 and .   CT showed knee joint effusion with some peripheral enhancement which could represent sterile effusion and nonspecific reactive synovitis; however, septic arthritis not excluded, additionally, there is a prepatellar small rim enhancing collection favored to represent nonspecific bursitis, although small abscess not excluded in the setting of surrounding cellulitis of LLE.   -Vanc and Zosyn  -blood cx's  -sepsis fluids, LA trend  -GenSx consulted to assess  -Will consult ortho to see if fluid collection (vs bursitis) should be tapped

## 2022-05-08 NOTE — PLAN OF CARE
Plan of care reviewed with patient. Patient aaox4 and up in bed. No acute resp distress noted. Patient reported some vaginal bleeding. Dr. Zuleta informed and also made aware that patient hasn't taken her home progesterone for the past two days. MD replied that he would check with pharmacy about an alternative since med is not on our formulary. Patient doesn't have her home medication with her. Patient informed of MD's response. Bed in low position. Call light within reach.       Problem: Adult Inpatient Plan of Care  Goal: Plan of Care Review  Outcome: Ongoing, Progressing  Goal: Patient-Specific Goal (Individualized)  Outcome: Ongoing, Progressing  Goal: Absence of Hospital-Acquired Illness or Injury  Outcome: Ongoing, Progressing  Goal: Optimal Comfort and Wellbeing  Outcome: Ongoing, Progressing  Goal: Readiness for Transition of Care  Outcome: Ongoing, Progressing     Problem: Bariatric Environmental Safety  Goal: Safety Maintained with Care  Outcome: Ongoing, Progressing     Problem: Diabetes Comorbidity  Goal: Blood Glucose Level Within Targeted Range  Outcome: Ongoing, Progressing     Problem: Fall Injury Risk  Goal: Absence of Fall and Fall-Related Injury  Outcome: Ongoing, Progressing

## 2022-05-08 NOTE — HPI
57F pmhx a-fib, CHF, and obesity presents for left lower extremity erythema, swelling and pain; ortho consulted to r/o left knee septic arthritis. Patient reports on 5/5/22 woke up and noticed left lower leg and left anterior knee were red. This progressed throughout the day. On 5/6/22 symptoms continued and had fever to 101, went to Urgent Care however was not seen. Today (5/7/22) symptoms continued to worsen so came to ER. Having swelling and erythema to left leg and anterior knee with mild associated pain. No global left knee pain, some pain in anterior knee with extreme of flexion. No pain with weight bearing. Has not tried any abx yet. No hx septic arthritis or recurrent infections. Deneis smoking, etoh, IVDU.

## 2022-05-08 NOTE — ASSESSMENT & PLAN NOTE
- S/p 2 cardioversion.  She is now in sinus.  - continue propafenone  - resume metoprolol.  Increase to home dose as tolerated.  - space continue apixaban

## 2022-05-08 NOTE — ASSESSMENT & PLAN NOTE
57F with left knee and leg cellulitis, and likely left prepatellar bursitis. Tmax 101 at home, , WBC 8 (wnl),  (elevated),  (elevated),  lactate 3.9 (elevated). Based on clinical picture low concern for septic arthritis - no global erythema of knee, no joint line tenderness, painless ROM left knee on exam, no pain with weight bearing or axial load. History, exam and imaging consistent with cellulitis and prepatellar bursitis. Her bursitis is not causing any wounds or skin compromise and thus can be treated with trial of abx. Hospital medicine plans to admit and treat with IV abx.     Recs  -No acute orthopedic surgical intervention at this time  -IV abx per hospital medicine  -WBAT LLE  -If left knee exam worsens please contact ortho to evaluate

## 2022-05-08 NOTE — H&P
Samir fareed - Emergency Dept  [facility]  History & Physical    Patient Name: Vicky Alvarado  MRN: [id number]  Patient Class: OP- Observation  Admission Date: 5/7/2022  Attending Physician: Reed Zuleta MD   Primary Care Provider: Gordy Cordero MD         Patient information was obtained from patient, past medical records and ER records.     Subjective:     Principal Problem:Cellulitis of extremity    Chief Complaint:   Chief Complaint   Patient presents with    Cellulitis     101 temp yesterday.         HPI:   Vicky Alvarado is a 57 y.o. female who has a past medical history of Anticoagulant long-term use, Arthritis, Atrial fibrillation, vascular necrosis, CHF, Depression, GERD, psychiatric care, Hypertension, Iron deficiency anemia, Obese, now Pre-diabetes, Psychiatric problem, and Sleep apnea, presented to the ED with Erythema and Pain to her left leg. Which started about 2 days ago. Patient had 101F yesterday. She attempted urgent care but they were unable to see her. On presentation today, her leg is erythematous and tender to touch. She denies any injury. 3/4 SIRS with 101F, , RR 22, with normal WBC#. She has lactic acidosis of 3.8 and . CT showed knee joint effusion with some peripheral enhancement which could represent sterile effusion and nonspecific reactive synovitis; however, septic arthritis not excluded, additionally, there is a prepatellar small rim enhancing collection favored to represent nonspecific bursitis, although small abscess not excluded in the setting of surrounding cellulitis of LLE.     Incidental/new findings: 3.8 cm incidental left adnexal cyst, suspected leiomyomatous uterus, iron deficient.      Past Medical History:   Diagnosis Date    Anticoagulant long-term use     Arthritis     Atrial fibrillation     cardioversion    Atrial fibrillation with RVR     Avascular necrosis     L hand    CHF (congestive heart failure)     Depression      Encounter for blood transfusion 07/22/2020    GERD (gastroesophageal reflux disease)     Hx of psychiatric care     Hypertension     Iron deficiency anemia 5/7/2022    TIBC 444 with saturated iron 8    Obese     Pre-diabetes 5/7/2022    Psychiatric problem     Sleep apnea        Past Surgical History:   Procedure Laterality Date    ABLATION OF ARRHYTHMOGENIC FOCUS FOR ATRIAL FIBRILLATION N/A 7/20/2020    Procedure: Ablation atrial fibrillation;  Surgeon: Gabriel Hawley MD;  Location: [facility] EP LAB;  Service: Cardiology;  Laterality: N/A;  afib, PVI, RFA, REENA, SHADY, anes, MB, 3 Prep    ABLATION OF ARRHYTHMOGENIC FOCUS FOR ATRIAL FIBRILLATION N/A 1/24/2022    Procedure: Ablation atrial fibrillation;  Surgeon: Gabriel Hawley MD;  Location: [facility] EP LAB;  Service: Cardiology;  Laterality: N/A;  afib/afl, PVI (re-do)/CTI, RFA, REENA (cx if SR), SHADY, anes, MB, 3 Prep    APPLICATION OF WOUND VACUUM-ASSISTED CLOSURE DEVICE Left 8/3/2020    Procedure: APPLICATION, WOUND VAC;  Surgeon: SEMAJ Márquez III, MD;  Location: [facility] OR 2ND FLR;  Service: Peripheral Vascular;  Laterality: Left;    APPLICATION OF WOUND VACUUM-ASSISTED CLOSURE DEVICE Left 8/6/2020    Procedure: APPLICATION, WOUND VAC;  Surgeon: SEMAJ Márquez III, MD;  Location: [facility] OR 2ND FLR;  Service: Peripheral Vascular;  Laterality: Left;    CARPAL TUNNEL RELEASE Right 6/10/2020    Procedure: RELEASE, CARPAL TUNNEL - RIGHT;  Surgeon: Adelaida Hall MD;  Location: [facility] OR;  Service: Orthopedics;  Laterality: Right;  GENERAL AND REGIONAL    CARPAL TUNNEL RELEASE Left 5/5/2021    Procedure: RELEASE, CARPAL TUNNEL, LEFT;  Surgeon: Adelaida Hall MD;  Location: [facility] OR;  Service: Orthopedics;  Laterality: Left;  GENERAL & REGIONAL IN PACU    CLOSURE OF WOUND Left 8/6/2020    Procedure: CLOSURE, WOUND;  Surgeon: SEMAJ Márquez III, MD;  Location: [facility] OR 2ND FLR;  Service: Peripheral Vascular;  Laterality: Left;  Complex     COLONOSCOPY N/A 8/17/2021    Procedure: COLONOSCOPY Suprep;  Surgeon: Loco Deluca MD;  Location: [facility] ENDO;  Service: Endoscopy;  Laterality: N/A;    ESOPHAGOGASTRODUODENOSCOPY N/A 8/17/2021    Procedure: EGD (ESOPHAGOGASTRODUODENOSCOPY);  Surgeon: Loco Deluca MD;  Location: [facility] ENDO;  Service: Endoscopy;  Laterality: N/A;  Patient is schedule to have her Covid test on 08/14/2021 at the [facility] @ 9:30am. [place].    EXPLORATION OF FEMORAL ARTERY Left 7/21/2020    Procedure: EXPLORATION, ARTERY, FEMORAL;  Surgeon: SEMAJ Márquez III, MD;  [facility];  Service: Peripheral Vascular;  Laterality: Left;  1. Urgent Direct repair, L SFA branch laceration    FOOT SURGERY      HYSTEROSCOPY WITH DILATION AND CURETTAGE OF UTERUS N/A 2/19/2022    Procedure: HYSTEROSCOPY, WITH DILATION AND CURETTAGE OF UTERUS;  Surgeon: Shane Palomo MD;  [facility];  Service: OB/GYN;  Laterality: N/A;    TREATMENT OF CARDIAC ARRHYTHMIA N/A 1/29/2020    Procedure: CARDIOVERSION;  Surgeon: Gabriel Hawley MD;  Location: [facility] EP LAB;  Service: Cardiology;  Laterality: N/A;  af, demi, dccv, anes, mb, 345    TREATMENT OF CARDIAC ARRHYTHMIA  1/24/2022    Procedure: Cardioversion or Defibrillation;  Surgeon: Gabriel Hawley MD;  Location: [facility] EP LAB;  Service: Cardiology;;    WOUND DEBRIDEMENT Left 8/6/2020    Procedure: DEBRIDEMENT, WOUND;  Surgeon: SEMAJ Márquez III, MD;  [facility];  Service: Peripheral Vascular;  Laterality: Left;       Review of patient's allergies indicates:   Allergen Reactions    Flecainide Shortness Of Breath and Swelling       Current Facility-Administered Medications on File Prior to Encounter   Medication    sodium chloride 0.9% bolus 1,000 mL     Current Outpatient Medications on File Prior to Encounter   Medication Sig    acetaminophen (TYLENOL) 500 MG tablet Take 2 tablets (1,000 mg) by mouth at onset of pain or headache, may  repeat if needed.    ALPRAZolam (XANAX) 0.5 MG tablet Take 1 tablet (0.5 mg total) by mouth 2 (two) times daily as needed for Anxiety.    apixaban (ELIQUIS) 5 mg Tab Take 1 tablet (5 mg total) by mouth 2 (two) times daily.    atorvastatin (LIPITOR) 40 MG tablet Take 1 tablet (40 mg total) by mouth once daily.    b complex vitamins capsule Take 1 capsule by mouth once daily.    colchicine (COLCRYS) 0.6 mg tablet For gout attack: Take TWO tablets by mouth, then, 2 hours later, take ONE additional tablet. Then take 1 tablet twice daily only if still needed for gout pain. (Patient taking differently: For gout attack: Take TWO tablets by mouth, then, 2 hours later, take ONE additional tablet. Then take 1 tablet twice daily only if still needed for gout pain.)    DULoxetine (CYMBALTA) 60 MG capsule Take 2 capsules (120 mg total) by mouth once daily.    ferrous sulfate (SLOW RELEASE IRON) 142 mg (45 mg iron) TbSR Take 1 tablet (142 mg total) by mouth once daily. FOR 7 DAY THEN INCREASE TO TWICE DAILY AS TOLERATED    furosemide (LASIX) 40 MG tablet Take 1 tablet (40 mg total) by mouth 2 (two) times daily. Resume as needed for edema until followup with your PCP.    [facility]#[id number] 750 mg-644 mg- 30 mg-1 mg Tab Take 1 tablet by mouth once daily.     krill/om-3/dha/epa/phospho/ast (MEGARED OMEGA-3 KRILL OIL ORAL) Take 1 capsule by mouth once daily.    lisinopriL (PRINIVIL,ZESTRIL) 40 MG tablet Take 0.5 tablets (20 mg total) by mouth once daily.    lisinopriL (PRINIVIL,ZESTRIL) 40 MG tablet Take 1 tablet (40 mg total) by mouth once daily.    melatonin 10 mg Tab Take 1-2 tablets by mouth nightly as needed (Sleep).    metoprolol succinate (TOPROL-XL) 50 MG 24 hr tablet Take 1 tablet (50 mg total) by mouth 2 (two) times daily.    multivitamin (THERAGRAN) per tablet Take 1 tablet by mouth once daily.    NIFEdipine (PROCARDIA-XL) 90 MG (OSM) 24 hr tablet Take 1 tablet (90 mg total) by mouth once daily.  HOLD UNTIL FOLLOW-UP WITH YOUR PCP.    omeprazole (PRILOSEC) 20 MG capsule TAKE 1 CAPSULE BY MOUTH ONCE DAILY    ondansetron (ZOFRAN-ODT) 4 MG TbDL Take 2 tablets (8 mg total) by mouth every 8 (eight) hours as needed (nasuea, vomiting).    polyethylene glycol (GLYCOLAX) 17 gram PwPk Take by mouth as needed (constipation).    potassium chloride SA (K-DUR,KLOR-CON) 20 MEQ tablet Take 1 tablet (20 mEq total) by mouth once daily. ONLY TAKE WITH LASIX.    progesterone (PROMETRIUM) 200 MG capsule Take 1 capsule (200 mg total) by mouth 2 (two) times a day.    propafenone (RYTHMOL) 225 MG Tab Take 1 tablet (225 mg total) by mouth every 8 (eight) hours.    traZODone (DESYREL) 100 MG tablet Take 1 tablet (100 mg total) by mouth nightly as needed for Insomnia.    UNABLE TO FIND Take 1 tablet by mouth once daily. Trino's Leg Cramps    [DISCONTINUED] pantoprazole (PROTONIX) 40 MG tablet Take 1 tablet (40 mg total) by mouth once daily. (Patient not taking: Reported on 2022)     Family History       Problem Relation (Age of Onset)    Cataracts Mother    Diabetes Father    Hypertension Mother, Father, Sister, Brother    Thyroid disease Mother          Tobacco Use    Smoking status: Former Smoker     Types: Cigarettes     Quit date: 2019     Years since quittin.8    Smokeless tobacco: Never Used   Substance and Sexual Activity    Alcohol use: No    Drug use: No    Sexual activity: Not on file     Review of Systems   Constitutional:  Positive for activity change and fever.   HENT:  Negative for sore throat and trouble swallowing.    Eyes:  Negative for photophobia and visual disturbance.   Respiratory:  Negative for chest tightness and shortness of breath.    Cardiovascular:  Negative for chest pain, palpitations and leg swelling.   Gastrointestinal:  Negative for abdominal distention, abdominal pain, blood in stool, constipation, diarrhea, nausea and vomiting.   Endocrine: Negative for polyphagia and  polyuria.   Genitourinary:  Negative for difficulty urinating, dysuria and hematuria.   Musculoskeletal:  Positive for joint swelling.   Skin:  Positive for color change.   Allergic/Immunologic: Negative for immunocompromised state.   Neurological:  Negative for dizziness, seizures, syncope and facial asymmetry.   Psychiatric/Behavioral:  Negative for agitation, behavioral problems and confusion.    Objective:     Vital Signs (Most Recent):  Temp: 98.3 °F (36.8 °C) (05/07/22 1934)  Pulse: 105 (05/07/22 1934)  Resp: (!) 22 (05/07/22 1934)  BP: 114/76 (05/07/22 1934)  SpO2: 97 % (05/07/22 1934)   Vital Signs (24h Range):  Temp:  [98 °F (36.7 °C)-98.4 °F (36.9 °C)] 98.3 °F (36.8 °C)  Pulse:  [] 105  Resp:  [16-23] 22  SpO2:  [97 %-99 %] 97 %  BP: (114-130)/(65-76) 114/76     Weight: 130.6 kg (288 lb)  Body mass index is 46.48 kg/m².    Physical Exam  Vitals and nursing note reviewed.   Constitutional:       Appearance: She is well-developed. She is obese. She is ill-appearing. She is not toxic-appearing or diaphoretic.   HENT:      Head: Normocephalic and atraumatic.      Nose: Nose normal. No congestion.   Eyes:      General: No scleral icterus.     Pupils: Pupils are equal, round, and reactive to light.   Neck:      Thyroid: No thyromegaly.   Cardiovascular:      Rate and Rhythm: Normal rate and regular rhythm.      Heart sounds: No murmur heard.    No gallop.   Pulmonary:      Effort: Pulmonary effort is normal.      Breath sounds: Normal breath sounds. No stridor. No wheezing or rales.   Abdominal:      General: There is no distension.      Palpations: Abdomen is soft. There is no mass.      Tenderness: There is no abdominal tenderness. There is no guarding.   Musculoskeletal:         General: Swelling and tenderness present. No signs of injury. Normal range of motion.      Cervical back: Normal range of motion and neck supple. No rigidity.      Right lower leg: No edema.      Left lower leg: Edema present.    Lymphadenopathy:      Cervical: No cervical adenopathy.   Skin:     General: Skin is warm and dry.      Capillary Refill: Capillary refill takes less than 2 seconds.      Findings: Erythema present.   Neurological:      General: No focal deficit present.      Mental Status: She is alert and oriented to person, place, and time.      Cranial Nerves: No cranial nerve deficit.      Motor: No weakness.   Psychiatric:         Mood and Affect: Mood normal.         Behavior: Behavior normal.         CRANIAL NERVES     CN III, IV, VI   Pupils are equal, round, and reactive to light.         Recent Results (from the past 24 hour(s))   Blood Culture #1 **CANNOT BE ORDERED STAT**    Collection Time: 05/07/22 12:46 PM    Specimen: Peripheral, Antecubital, Left; Blood   Result Value Ref Range    Blood Culture, Routine No Growth to date    CBC auto differential    Collection Time: 05/07/22 12:47 PM   Result Value Ref Range    WBC 8.38 3.90 - 12.70 K/uL    RBC 3.71 (L) 4.00 - 5.40 M/uL    Hemoglobin 10.2 (L) 12.0 - 16.0 g/dL    Hematocrit 31.9 (L) 37.0 - 48.5 %    MCV 86 82 - 98 fL    MCH 27.5 27.0 - 31.0 pg    MCHC 32.0 32.0 - 36.0 g/dL    RDW 17.2 (H) 11.5 - 14.5 %    Platelets 161 150 - 450 K/uL    MPV 12.2 9.2 - 12.9 fL    Immature Granulocytes 0.4 0.0 - 0.5 %    Gran # (ANC) 6.1 1.8 - 7.7 K/uL    Immature Grans (Abs) 0.03 0.00 - 0.04 K/uL    Lymph # 1.5 1.0 - 4.8 K/uL    Mono # 0.7 0.3 - 1.0 K/uL    Eos # 0.1 0.0 - 0.5 K/uL    Baso # 0.05 0.00 - 0.20 K/uL    nRBC 0 0 /100 WBC    Gran % 72.5 38.0 - 73.0 %    Lymph % 17.3 (L) 18.0 - 48.0 %    Mono % 7.9 4.0 - 15.0 %    Eosinophil % 1.3 0.0 - 8.0 %    Basophil % 0.6 0.0 - 1.9 %    Differential Method Automated    Comprehensive metabolic panel    Collection Time: 05/07/22 12:47 PM   Result Value Ref Range    Sodium 135 (L) 136 - 145 mmol/L    Potassium 5.2 (H) 3.5 - 5.1 mmol/L    Chloride 101 95 - 110 mmol/L    CO2 19 (L) 23 - 29 mmol/L    Glucose 141 (H) 70 - 110 mg/dL    BUN 9  6 - 20 mg/dL    Creatinine 0.9 0.5 - 1.4 mg/dL    Calcium 9.1 8.7 - 10.5 mg/dL    Total Protein 7.7 6.0 - 8.4 g/dL    Albumin 3.3 (L) 3.5 - 5.2 g/dL    Total Bilirubin 0.8 0.1 - 1.0 mg/dL    Alkaline Phosphatase 98 55 - 135 U/L    AST 47 (H) 10 - 40 U/L    ALT 17 10 - 44 U/L    Anion Gap 15 8 - 16 mmol/L    eGFR if African American >60.0 >60 mL/min/1.73 m^2    eGFR if non African American >60.0 >60 mL/min/1.73 m^2   Iron and TIBC    Collection Time: 05/07/22 12:47 PM   Result Value Ref Range    Iron 34 30 - 160 ug/dL    Transferrin 300 200 - 375 mg/dL    TIBC 444 250 - 450 ug/dL    Saturated Iron 8 (L) 20 - 50 %   Lactic acid, plasma    Collection Time: 05/07/22 12:47 PM   Result Value Ref Range    Lactate (Lactic Acid) 3.8 (HH) 0.5 - 2.2 mmol/L   C-Reactive Protein    Collection Time: 05/07/22 12:47 PM   Result Value Ref Range    .9 (H) 0.0 - 8.2 mg/L   Blood Culture #2 **CANNOT BE ORDERED STAT**    Collection Time: 05/07/22 12:58 PM    Specimen: Peripheral, Antecubital, Right; Blood   Result Value Ref Range    Blood Culture, Routine No Growth to date    Hemoglobin A1C    Collection Time: 05/07/22  3:43 PM   Result Value Ref Range    Hemoglobin A1C 5.7 (H) 4.0 - 5.6 %    Estimated Avg Glucose 117 68 - 131 mg/dL       Microbiology Results (last 7 days)       Procedure Component Value Units Date/Time    Blood Culture #1 **CANNOT BE ORDERED STAT** [[id number]] Collected: 05/07/22 1246    Order Status: Completed Specimen: Blood from Peripheral, Antecubital, Left Updated: 05/07/22 1915     Blood Culture, Routine No Growth to date    Blood Culture #2 **CANNOT BE ORDERED STAT** [[id number]] Collected: 05/07/22 1258    Order Status: Completed Specimen: Blood from Peripheral, Antecubital, Right Updated: 05/07/22 1915     Blood Culture, Routine No Growth to date             Imaging Results               CT Thigh With Contrast Left (Final result)  Result time 05/07/22 19:04:28      Final result by Dre Real MD  (05/07/22 19:04:28)                   Impression:      Findings concerning for left lower extremity cellulitis.    Knee joint effusion with some peripheral enhancement which could represent sterile effusion and nonspecific reactive synovitis; however, septic arthritis not excluded.  Additionally, there is a prepatellar small rim enhancing collection favored to represent nonspecific bursitis, although small abscess not excluded in the setting of surrounding cellulitis.  Clinical correlation advised.  Fluid sampling may be warranted.    Mild degenerative changes of the lower extremity as detailed above.    3.8 cm incidental left adnexal cyst.  For a patient of the this age group pelvic ultrasound follow-up in 6-12 months is recommended per ACR guidelines, or sooner if associated pain develops.    Suspected leiomyomatous uterus.  Further evaluation with elective/nonemergent pelvic ultrasound can be obtained as warranted.    Additional findings above.    This report was flagged in Epic as abnormal.    Electronically signed by resident: Ramon Hammonds  Date:    05/07/2022  Time:    18:26    Electronically signed by: Dre Real MD  Date:    05/07/2022  Time:    19:04               Narrative:    EXAMINATION:  CT THIGH WITH CONTRAST LEFT; CT LEG (TIBIA-FIBULA) WITH CONTRAST LEFT    CLINICAL HISTORY:  Soft tissue infection suspected, thigh, xray done;; Soft tissue infection suspected, lower leg, xray done;    TECHNIQUE:  Axial images performed through the iliac crests to the foot of the left lower extremity after administration of 100 mL of intravenous Omnipaque 350.  Coronal and sagittal reformations performed.    COMPARISON:  Lower extremity veins ultrasound 05/07/2022    FINDINGS:  The bones of the left lower extremity demonstrate mild degenerative changes of the left knee indicated by subchondral sclerosis, osteophytic formation, and mild joint space narrowing.  Degenerative changes of the 1st metatarsophalangeal  joint is noted as well.  No acute fracture.  No osseous destructive lesions.  Small suprapatellar and femorotibial joint effusions with some peripheral enhancement.    The soft tissues of the left lower extremity demonstrate multiple areas of soft tissue attenuation with peripheral calcification in the subcutaneous soft tissues overlying the lateral left thigh, perhaps relating to remote trauma or injection medication use.  Left inguinal skin thickening, likely postsurgical related to prior left superficial femoral artery branch laceration repair.  There is left inguinal lymphadenopathy, for example lymph node measuring 1.2 cm in short axis (series 301, image 845). Septation of the subcutaneous fat involving the left lower extremity mainly in the lower leg, indicating edema.  Mild skin thickening overlying the left lateral lower leg.  There is approximate 2.8 x 1.2 x 2.8 cm slightly heterogeneous collection within enhancing rim along the anterior mid to lower aspect of the patella.    Limited intrapelvic evaluation demonstrates enlargement of the uterus with punctate internal calcifications, likely leiomyomatous uterus.  3.8 cm left adnexal hypodensity most consistent with a cyst.                                        CT Leg (Tibia-Fibula) Wtih Contrast Left (Final result)  Result time 05/07/22 19:04:28      Final result by Dre Real MD (05/07/22 19:04:28)                   Impression:      Findings concerning for left lower extremity cellulitis.    Knee joint effusion with some peripheral enhancement which could represent sterile effusion and nonspecific reactive synovitis; however, septic arthritis not excluded.  Additionally, there is a prepatellar small rim enhancing collection favored to represent nonspecific bursitis, although small abscess not excluded in the setting of surrounding cellulitis.  Clinical correlation advised.  Fluid sampling may be warranted.    Mild degenerative changes of the lower  extremity as detailed above.    3.8 cm incidental left adnexal cyst.  For a patient of the this age group pelvic ultrasound follow-up in 6-12 months is recommended per ACR guidelines, or sooner if associated pain develops.    Suspected leiomyomatous uterus.  Further evaluation with elective/nonemergent pelvic ultrasound can be obtained as warranted.    Additional findings above.    This report was flagged in Epic as abnormal.    Electronically signed by resident: Ramon Hammonds  Date:    05/07/2022  Time:    18:26    Electronically signed by: Dre Real MD  Date:    05/07/2022  Time:    19:04               Narrative:    EXAMINATION:  CT THIGH WITH CONTRAST LEFT; CT LEG (TIBIA-FIBULA) WITH CONTRAST LEFT    CLINICAL HISTORY:  Soft tissue infection suspected, thigh, xray done;; Soft tissue infection suspected, lower leg, xray done;    TECHNIQUE:  Axial images performed through the iliac crests to the foot of the left lower extremity after administration of 100 mL of intravenous Omnipaque 350.  Coronal and sagittal reformations performed.    COMPARISON:  Lower extremity veins ultrasound 05/07/2022    FINDINGS:  The bones of the left lower extremity demonstrate mild degenerative changes of the left knee indicated by subchondral sclerosis, osteophytic formation, and mild joint space narrowing.  Degenerative changes of the 1st metatarsophalangeal joint is noted as well.  No acute fracture.  No osseous destructive lesions.  Small suprapatellar and femorotibial joint effusions with some peripheral enhancement.    The soft tissues of the left lower extremity demonstrate multiple areas of soft tissue attenuation with peripheral calcification in the subcutaneous soft tissues overlying the lateral left thigh, perhaps relating to remote trauma or injection medication use.  Left inguinal skin thickening, likely postsurgical related to prior left superficial femoral artery branch laceration repair.  There is left inguinal  lymphadenopathy, for example lymph node measuring 1.2 cm in short axis (series 301, image 845). Septation of the subcutaneous fat involving the left lower extremity mainly in the lower leg, indicating edema.  Mild skin thickening overlying the left lateral lower leg.  There is approximate 2.8 x 1.2 x 2.8 cm slightly heterogeneous collection within enhancing rim along the anterior mid to lower aspect of the patella.    Limited intrapelvic evaluation demonstrates enlargement of the uterus with punctate internal calcifications, likely leiomyomatous uterus.  3.8 cm left adnexal hypodensity most consistent with a cyst.                                       US Lower Extremity Veins Left (Final result)  Result time 05/07/22 14:23:00      Final result by Dre Real MD (05/07/22 14:23:00)                   Impression:      No evidence of deep venous thrombosis in the left lower extremity.      Electronically signed by: Dre Real MD  Date:    05/07/2022  Time:    14:23               Narrative:    EXAMINATION:  US LOWER EXTREMITY VEINS LEFT    CLINICAL HISTORY:  Other specified soft tissue disorders    TECHNIQUE:  Duplex and color flow Doppler evaluation and graded compression of the left lower extremity veins was performed.    COMPARISON:  None    FINDINGS:  Left thigh veins: The common femoral, femoral, popliteal, upper greater saphenous, and deep femoral veins are patent and free of thrombus. The veins are normally compressible and have normal phasic flow and augmentation response.    Left calf veins: The visualized calf veins are patent.    Contralateral CFV: The contralateral (right) common femoral vein is patent and free of thrombus.    Miscellaneous: Nonspecific subcutaneous edema at the left calf region.                                       X-Ray Tibia Fibula 2 View Left (Final result)  Result time 05/07/22 13:57:38      Final result by Joshua Fritz MD (05/07/22 13:57:38)                   Impression:       As above.      Electronically signed by: Joshua Fritz  Date:    05/07/2022  Time:    13:57               Narrative:    EXAMINATION:  XR TIBIA FIBULA 2 VIEW LEFT    CLINICAL HISTORY:  Pain in leg, unspecified    TECHNIQUE:  AP and lateral views of the left tibia and fibula were performed.    COMPARISON:  None.    FINDINGS:  No acute fracture, dislocation or osseous destruction.  No radiopaque foreign body.  Large calcaneal enthesophyte at the distal Achilles insertion.  Partial visualized femorotibial degenerative change.                                          Assessment/Plan:     * Cellulitis of extremity  3/4 SIRS with 101F, , RR 22, with normal WBC#.   She has lactic acidosis of 3.8 and .   CT showed knee joint effusion with some peripheral enhancement which could represent sterile effusion and nonspecific reactive synovitis; however, septic arthritis not excluded, additionally, there is a prepatellar small rim enhancing collection favored to represent nonspecific bursitis, although small abscess not excluded in the setting of surrounding cellulitis of LLE.   -Vanc and Zosyn  -blood cx's  -sepsis fluids, LA trend  -GenSx consulted to assess  -Will consult ortho to see if fluid collection (vs bursitis) should be tapped    Iron deficiency anemia  Give iron after infection settles      Recurrent major depressive disorder, in partial remission  Continue duloxetine      Gastroesophageal reflux disease without esophagitis  Continue PPI      Pure hypercholesterolemia  Cont statin      ERENDIRA (obstructive sleep apnea)  Offer CPAP nightly      Type 2 diabetes mellitus with diabetic polyneuropathy, without long-term current use of insulin  Well controlled. A1c 5.7  Consider metoprolol at discharge if not on  Statin  Consider bariatric surgery      Morbid obesity with BMI of 45.0-49.9, adult  Body mass index is 46.48 kg/m². Morbid obesity complicates all aspects of disease management from diagnostic  modalities to treatment. Weight loss encouraged and health benefits explained to patient.   -Given DM and Cardiac disease, consider bariatric surgery        Atrial Fibrillation (paroxysmal)  - S/p 2 cardioversion.  She is now in sinus.  - continue propafenone  - resume metoprolol.  Increase to home dose as tolerated.  - space continue apixaban    Essential hypertension  Continue home meds:  Lisinopril metoprolol nifedipine      Opioid dependence in remission  Noted      VTE Risk Mitigation (From admission, onward)         Ordered     apixaban tablet 5 mg  2 times daily         05/07/22 2000     IP VTE HIGH RISK PATIENT  Once         05/07/22 1953     Place sequential compression device  Until discontinued         05/07/22 1953                   Reed Zuleta MD  Department of Hospital Medicine   [facility] Dept

## 2022-05-08 NOTE — HPI
Vicky Alvarado is a 57 y.o. female who has a past medical history of Anticoagulant long-term use, Arthritis, Atrial fibrillation, vascular necrosis, CHF, Depression, GERD, psychiatric care, Hypertension, Iron deficiency anemia, Obese, now Pre-diabetes, Psychiatric problem, and Sleep apnea, presented to the ED with Erythema and Pain to her left leg. Which started about 2 days ago. Patient had 101F yesterday. She attempted urgent care but they were unable to see her. On presentation today, her leg is erythematous and tender to touch. She denies any injury. 3/4 SIRS with 101F, , RR 22, with normal WBC#. She has lactic acidosis of 3.8 and . CT showed knee joint effusion with some peripheral enhancement which could represent sterile effusion and nonspecific reactive synovitis; however, septic arthritis not excluded, additionally, there is a prepatellar small rim enhancing collection favored to represent nonspecific bursitis, although small abscess not excluded in the setting of surrounding cellulitis of LLE.     Incidental/new findings: 3.8 cm incidental left adnexal cyst, suspected leiomyomatous uterus, iron deficient.

## 2022-05-08 NOTE — SUBJECTIVE & OBJECTIVE
Current Facility-Administered Medications on File Prior to Encounter   Medication    sodium chloride 0.9% bolus 1,000 mL     Current Outpatient Medications on File Prior to Encounter   Medication Sig    acetaminophen (TYLENOL) 500 MG tablet Take 2 tablets (1,000 mg) by mouth at onset of pain or headache, may repeat if needed.    ALPRAZolam (XANAX) 0.5 MG tablet Take 1 tablet (0.5 mg total) by mouth 2 (two) times daily as needed for Anxiety.    apixaban (ELIQUIS) 5 mg Tab Take 1 tablet (5 mg total) by mouth 2 (two) times daily.    atorvastatin (LIPITOR) 40 MG tablet Take 1 tablet (40 mg total) by mouth once daily.    b complex vitamins capsule Take 1 capsule by mouth once daily.    colchicine (COLCRYS) 0.6 mg tablet For gout attack: Take TWO tablets by mouth, then, 2 hours later, take ONE additional tablet. Then take 1 tablet twice daily only if still needed for gout pain. (Patient taking differently: For gout attack: Take TWO tablets by mouth, then, 2 hours later, take ONE additional tablet. Then take 1 tablet twice daily only if still needed for gout pain.)    DULoxetine (CYMBALTA) 60 MG capsule Take 2 capsules (120 mg total) by mouth once daily.    ferrous sulfate (SLOW RELEASE IRON) 142 mg (45 mg iron) TbSR Take 1 tablet (142 mg total) by mouth once daily. FOR 7 DAY THEN INCREASE TO TWICE DAILY AS TOLERATED    furosemide (LASIX) 40 MG tablet Take 1 tablet (40 mg total) by mouth 2 (two) times daily. Resume as needed for edema until followup with your PCP.    gluc-shikha-[facility]#[id number]-O-pzzw-reymundo-bor 750 mg-644 mg- 30 mg-1 mg Tab Take 1 tablet by mouth once daily.     krill/om-3/dha/epa/phospho/ast (MEGARED OMEGA-3 KRILL OIL ORAL) Take 1 capsule by mouth once daily.    lisinopriL (PRINIVIL,ZESTRIL) 40 MG tablet Take 0.5 tablets (20 mg total) by mouth once daily.    lisinopriL (PRINIVIL,ZESTRIL) 40 MG tablet Take 1 tablet (40 mg total) by mouth once daily.    melatonin 10 mg Tab Take 1-2 tablets by mouth nightly as needed  (Sleep).    metoprolol succinate (TOPROL-XL) 50 MG 24 hr tablet Take 1 tablet (50 mg total) by mouth 2 (two) times daily.    multivitamin (THERAGRAN) per tablet Take 1 tablet by mouth once daily.    NIFEdipine (PROCARDIA-XL) 90 MG (OSM) 24 hr tablet Take 1 tablet (90 mg total) by mouth once daily. HOLD UNTIL FOLLOW-UP WITH YOUR PCP.    omeprazole (PRILOSEC) 20 MG capsule TAKE 1 CAPSULE BY MOUTH ONCE DAILY    ondansetron (ZOFRAN-ODT) 4 MG TbDL Take 2 tablets (8 mg total) by mouth every 8 (eight) hours as needed (nasuea, vomiting).    polyethylene glycol (GLYCOLAX) 17 gram PwPk Take by mouth as needed (constipation).    potassium chloride SA (K-DUR,KLOR-CON) 20 MEQ tablet Take 1 tablet (20 mEq total) by mouth once daily. ONLY TAKE WITH LASIX.    progesterone (PROMETRIUM) 200 MG capsule Take 1 capsule (200 mg total) by mouth 2 (two) times a day.    propafenone (RYTHMOL) 225 MG Tab Take 1 tablet (225 mg total) by mouth every 8 (eight) hours.    traZODone (DESYREL) 100 MG tablet Take 1 tablet (100 mg total) by mouth nightly as needed for Insomnia.    UNABLE TO FIND Take 1 tablet by mouth once daily. Trino's Leg Cramps    [DISCONTINUED] pantoprazole (PROTONIX) 40 MG tablet Take 1 tablet (40 mg total) by mouth once daily. (Patient not taking: Reported on 2/18/2022)       Review of patient's allergies indicates:   Allergen Reactions    Flecainide Shortness Of Breath and Swelling       Past Medical History:   Diagnosis Date    Anticoagulant long-term use     Arthritis     Atrial fibrillation     cardioversion    Atrial fibrillation with RVR     Avascular necrosis     L hand    CHF (congestive heart failure)     Depression     Encounter for blood transfusion 07/22/2020    GERD (gastroesophageal reflux disease)     Hx of psychiatric care     Hypertension     Iron deficiency anemia 5/7/2022    TIBC 444 with saturated iron 8    Obese     Pre-diabetes 5/7/2022    Psychiatric problem     Sleep apnea      Past Surgical History:    Procedure Laterality Date    ABLATION OF ARRHYTHMOGENIC FOCUS FOR ATRIAL FIBRILLATION N/A 7/20/2020    Procedure: Ablation atrial fibrillation;  Surgeon: Gabriel Hawley MD;  Location: [facility] EP LAB;  Service: Cardiology;  Laterality: N/A;  afib, PVI, RFA, REENA, SHADY, anes, MB, 3 Prep    ABLATION OF ARRHYTHMOGENIC FOCUS FOR ATRIAL FIBRILLATION N/A 1/24/2022    Procedure: Ablation atrial fibrillation;  Surgeon: Gabriel Hawley MD;  Location: [facility] EP LAB;  Service: Cardiology;  Laterality: N/A;  afib/afl, PVI (re-do)/CTI, RFA, REENA (cx if SR), SHADY, anes, MB, 3 Prep    APPLICATION OF WOUND VACUUM-ASSISTED CLOSURE DEVICE Left 8/3/2020    Procedure: APPLICATION, WOUND VAC;  Surgeon: SEMAJ Márquez III, MD;  Location: [facility] OR 2ND FLR;  Service: Peripheral Vascular;  Laterality: Left;    APPLICATION OF WOUND VACUUM-ASSISTED CLOSURE DEVICE Left 8/6/2020    Procedure: APPLICATION, WOUND VAC;  Surgeon: SEMAJ Márquez III, MD;  Location: [facility] OR 2ND FLR;  Service: Peripheral Vascular;  Laterality: Left;    CARPAL TUNNEL RELEASE Right 6/10/2020    Procedure: RELEASE, CARPAL TUNNEL - RIGHT;  Surgeon: Adelaida Hall MD;  Location: [facility];  Service: Orthopedics;  Laterality: Right;  GENERAL AND REGIONAL    CARPAL TUNNEL RELEASE Left 5/5/2021    Procedure: RELEASE, CARPAL TUNNEL, LEFT;  Surgeon: Adelaida Hall MD;  Location: [facility];  Service: Orthopedics;  Laterality: Left;  GENERAL & REGIONAL IN PACU    CLOSURE OF WOUND Left 8/6/2020    Procedure: CLOSURE, WOUND;  Surgeon: SEMAJ Márquez III, MD;  Location: [facility] OR 2ND FLR;  Service: Peripheral Vascular;  Laterality: Left;  Complex    COLONOSCOPY N/A 8/17/2021    Procedure: COLONOSCOPY Suprep;  Surgeon: Loco Deluca MD;  Location: [facility];  Service: Endoscopy;  Laterality: N/A;    ESOPHAGOGASTRODUODENOSCOPY N/A 8/17/2021    Procedure: EGD (ESOPHAGOGASTRODUODENOSCOPY);  Surgeon: Loco Deluca MD;  Location: [facility];  Service:  Endoscopy;  Laterality: N/A;  Patient is schedule to have her Covid test on 2021 at the [facility] @ 9:30am. [place].    EXPLORATION OF FEMORAL ARTERY Left 2020    Procedure: EXPLORATION, ARTERY, FEMORAL;  Surgeon: SEMAJ Márquez III, MD;  Location: [facility] OR [address] [place] [address];  Service: Peripheral Vascular;  Laterality: Left;  1. Urgent Direct repair, L SFA branch laceration    FOOT SURGERY      HYSTEROSCOPY WITH DILATION AND CURETTAGE OF UTERUS N/A 2022    Procedure: HYSTEROSCOPY, WITH DILATION AND CURETTAGE OF UTERUS;  Surgeon: Shane Palomo MD;  Location: [facility] OR [address] [place] [address];  Service: OB/GYN;  Laterality: N/A;    TREATMENT OF CARDIAC ARRHYTHMIA N/A 2020    Procedure: CARDIOVERSION;  Surgeon: Gabriel Hawley MD;  Location: [facility] EP LAB;  Service: Cardiology;  Laterality: N/A;  af, demi, dccv, anes, mb, 345    TREATMENT OF CARDIAC ARRHYTHMIA  2022    Procedure: Cardioversion or Defibrillation;  Surgeon: Gabriel Hawley MD;  Location: [facility] EP LAB;  Service: Cardiology;;    WOUND DEBRIDEMENT Left 2020    Procedure: DEBRIDEMENT, WOUND;  Surgeon: SEMAJ Márquez III, MD;  Location: [facility] OR [address] [place] [address];  Service: Peripheral Vascular;  Laterality: Left;     Family History       Problem Relation (Age of Onset)    Cataracts Mother    Diabetes Father    Hypertension Mother, Father, Sister, Brother    Thyroid disease Mother          Tobacco Use    Smoking status: Former Smoker     Types: Cigarettes     Quit date: 2019     Years since quittin.8    Smokeless tobacco: Never Used   Substance and Sexual Activity    Alcohol use: No    Drug use: No    Sexual activity: Not on file     Review of Systems   Constitutional:  Positive for activity change and fever. Negative for chills and unexpected weight change.   HENT: Negative.     Eyes: Negative.    Respiratory: Negative.     Cardiovascular: Negative.  Negative for chest pain and leg swelling.   Gastrointestinal: Negative.    Endocrine: Negative.     Genitourinary: Negative.    Musculoskeletal:  Positive for arthralgias, joint swelling and myalgias.   Skin:  Positive for rash.   Allergic/Immunologic: Negative.    Neurological: Negative.    Hematological: Negative.    Psychiatric/Behavioral: Negative.     Objective:     Vital Signs (Most Recent):  Temp: 98.3 °F (36.8 °C) (05/07/22 1934)  Pulse: 96 (05/07/22 2037)  Resp: (!) 22 (05/07/22 1934)  BP: 114/76 (05/07/22 2033)  SpO2: (!) 92 % (05/07/22 2037)   Vital Signs (24h Range):  Temp:  [98 °F (36.7 °C)-98.4 °F (36.9 °C)] 98.3 °F (36.8 °C)  Pulse:  [] 96  Resp:  [16-23] 22  SpO2:  [92 %-99 %] 92 %  BP: (114-130)/(65-76) 114/76     Weight: 130.6 kg (288 lb)  Body mass index is 46.48 kg/m².    Physical Exam  Vitals reviewed.   Cardiovascular:      Rate and Rhythm: Normal rate.      Pulses: Normal pulses.   Pulmonary:      Effort: Pulmonary effort is normal.   Abdominal:      Palpations: Abdomen is soft.   Musculoskeletal:         General: No swelling.   Skin:     General: Skin is dry.      Capillary Refill: Capillary refill takes less than 2 seconds.      Findings: Rash present.      Comments: Marked extent of erythema late afternoon in the ED    Erythema around her left knee extending down to the superior shin  No crepitus  No drainage  No abscess    She has separate area of erythema at her left medial thigh adjacent to the vulva   Neurological:      General: No focal deficit present.      Mental Status: She is alert and oriented to person, place, and time.   Psychiatric:         Mood and Affect: Mood normal.       Significant Labs:  I have reviewed all pertinent lab results within the past 24 hours.  CBC:   Recent Labs   Lab 05/07/22  1247   WBC 8.38   RBC 3.71*   HGB 10.2*   HCT 31.9*      MCV 86   MCH 27.5   MCHC 32.0     CMP:   Recent Labs   Lab 05/07/22  1247   *   CALCIUM 9.1   ALBUMIN 3.3*   PROT 7.7   *   K 5.2*   CO2 19*      BUN 9   CREATININE 0.9   ALKPHOS 98   ALT 17    AST 47*   BILITOT 0.8     Microbiology Results (last 7 days)       Procedure Component Value Units Date/Time    Blood Culture #1 **CANNOT BE ORDERED STAT** [[id number]] Collected: 05/07/22 1246    Order Status: Completed Specimen: Blood from Peripheral, Antecubital, Left Updated: 05/07/22 1915     Blood Culture, Routine No Growth to date    Blood Culture #2 **CANNOT BE ORDERED STAT** [[id number]] Collected: 05/07/22 1258    Order Status: Completed Specimen: Blood from Peripheral, Antecubital, Right Updated: 05/07/22 1915     Blood Culture, Routine No Growth to date          No results for input(s): COLORU, CLARITYU, SPECGRAV, PHUR, PROTEINUA, GLUCOSEU, BILIRUBINCON, BLOODU, WBCU, RBCU, BACTERIA, MUCUS, NITRITE, LEUKOCYTESUR, UROBILINOGEN, HYALINECASTS in the last 168 hours.    Significant Diagnostics:  I have reviewed all pertinent imaging results/findings within the past 24 hours.  CT: I have reviewed all pertinent results/findings within the past 24 hours and my personal findings are:  consistent with cellulitis, fluid around left knee joint that is indeterminant

## 2022-05-08 NOTE — SUBJECTIVE & OBJECTIVE
Past Medical History:   Diagnosis Date    Anticoagulant long-term use     Arthritis     Atrial fibrillation     cardioversion    Atrial fibrillation with RVR     Avascular necrosis     L hand    CHF (congestive heart failure)     Depression     Encounter for blood transfusion 07/22/2020    GERD (gastroesophageal reflux disease)     Hx of psychiatric care     Hypertension     Iron deficiency anemia 5/7/2022    TIBC 444 with saturated iron 8    Obese     Pre-diabetes 5/7/2022    Psychiatric problem     Sleep apnea        Past Surgical History:   Procedure Laterality Date    ABLATION OF ARRHYTHMOGENIC FOCUS FOR ATRIAL FIBRILLATION N/A 7/20/2020    Procedure: Ablation atrial fibrillation;  Surgeon: Gabriel Hawley MD;  Location: [facility] EP LAB;  Service: Cardiology;  Laterality: N/A;  afib, PVI, RFA, REENA, SHADY, anes, MB, 3 Prep    ABLATION OF ARRHYTHMOGENIC FOCUS FOR ATRIAL FIBRILLATION N/A 1/24/2022    Procedure: Ablation atrial fibrillation;  Surgeon: Gabriel Hawley MD;  Location: [facility] EP LAB;  Service: Cardiology;  Laterality: N/A;  afib/afl, PVI (re-do)/CTI, RFA, REENA (cx if SR), SHADY, anes, MB, 3 Prep    APPLICATION OF WOUND VACUUM-ASSISTED CLOSURE DEVICE Left 8/3/2020    Procedure: APPLICATION, WOUND VAC;  Surgeon: SEMAJ Márquez III, MD;  Location: [facility] OR 2ND FLR;  Service: Peripheral Vascular;  Laterality: Left;    APPLICATION OF WOUND VACUUM-ASSISTED CLOSURE DEVICE Left 8/6/2020    Procedure: APPLICATION, WOUND VAC;  Surgeon: SEMAJ Márquez III, MD;  Location: [facility] OR 2ND FLR;  Service: Peripheral Vascular;  Laterality: Left;    CARPAL TUNNEL RELEASE Right 6/10/2020    Procedure: RELEASE, CARPAL TUNNEL - RIGHT;  Surgeon: Adelaida Hall MD;  Location: [facility];  Service: Orthopedics;  Laterality: Right;  GENERAL AND REGIONAL    CARPAL TUNNEL RELEASE Left 5/5/2021    Procedure: RELEASE, CARPAL TUNNEL, LEFT;  Surgeon: Adelaida Hall MD;  Location: [facility];  Service: Orthopedics;  Laterality:  Left;  GENERAL & REGIONAL IN PACU    CLOSURE OF WOUND Left 8/6/2020    Procedure: CLOSURE, WOUND;  Surgeon: SEMAJ Márquez III, MD;  Location: [facility] OR [address] [place] [address];  Service: Peripheral Vascular;  Laterality: Left;  Complex    COLONOSCOPY N/A 8/17/2021    Procedure: COLONOSCOPY Suprep;  Surgeon: Loco Deluca MD;  Location: [facility] ENDO;  Service: Endoscopy;  Laterality: N/A;    ESOPHAGOGASTRODUODENOSCOPY N/A 8/17/2021    Procedure: EGD (ESOPHAGOGASTRODUODENOSCOPY);  Surgeon: Loco Deluca MD;  Location: [facility] ENDO;  Service: Endoscopy;  Laterality: N/A;  Patient is schedule to have her Covid test on 08/14/2021 at the [facility] @ 9:30am. [place].    EXPLORATION OF FEMORAL ARTERY Left 7/21/2020    Procedure: EXPLORATION, ARTERY, FEMORAL;  Surgeon: SEMAJ Márquez III, MD;  Location: [facility] OR [address] [place] [address];  Service: Peripheral Vascular;  Laterality: Left;  1. Urgent Direct repair, L SFA branch laceration    FOOT SURGERY      HYSTEROSCOPY WITH DILATION AND CURETTAGE OF UTERUS N/A 2/19/2022    Procedure: HYSTEROSCOPY, WITH DILATION AND CURETTAGE OF UTERUS;  Surgeon: Shane Palomo MD;  Location: [facility] OR [address] [place] [address];  Service: OB/GYN;  Laterality: N/A;    TREATMENT OF CARDIAC ARRHYTHMIA N/A 1/29/2020    Procedure: CARDIOVERSION;  Surgeon: Gabriel Hawley MD;  Location: [facility] EP LAB;  Service: Cardiology;  Laterality: N/A;  af, demi, dccv, anes, mb, 345    TREATMENT OF CARDIAC ARRHYTHMIA  1/24/2022    Procedure: Cardioversion or Defibrillation;  Surgeon: Gabriel Hawley MD;  Location: [facility] EP LAB;  Service: Cardiology;;    WOUND DEBRIDEMENT Left 8/6/2020    Procedure: DEBRIDEMENT, WOUND;  Surgeon: SEMAJ Márquez III, MD;  Location: [facility] OR [address] [place] [address];  Service: Peripheral Vascular;  Laterality: Left;       Review of patient's allergies indicates:   Allergen Reactions    Flecainide Shortness Of Breath and Swelling       Current Facility-Administered Medications   Medication    acetaminophen tablet 650 mg     ALPRAZolam tablet 0.5 mg    apixaban tablet 5 mg    [START ON 5/8/2022] atorvastatin tablet 40 mg    [START ON 5/8/2022] DULoxetine DR capsule 120 mg    furosemide tablet 40 mg    glucagon (human recombinant) injection 1 mg    glucose chewable tablet 16 g    glucose chewable tablet 24 g    [START ON 5/8/2022] lisinopriL tablet 20 mg    melatonin tablet 6 mg    [START ON 5/8/2022] metoprolol succinate (TOPROL-XL) 24 hr tablet 50 mg    miconazole NITRATE 2 % top powder    [START ON 5/8/2022] multivitamin tablet    naloxone 0.4 mg/mL injection 0.02 mg    [START ON 5/8/2022] NIFEdipine 24 hr tablet 90 mg    ondansetron disintegrating tablet 8 mg    oxyCODONE immediate release tablet 5 mg    [START ON 5/8/2022] pantoprazole EC tablet 40 mg    piperacillin-tazobactam 4.5 g in sodium chloride 0.9% 100 mL IVPB (ready to mix system)    polyethylene glycol packet 17 g    [START ON 5/8/2022] propafenone tablet 225 mg    traZODone tablet 50 mg    vancomycin - pharmacy to dose    [START ON 5/8/2022] vancomycin 1.75 g in 5 % dextrose 500 mL IVPB     Current Outpatient Medications   Medication Sig    acetaminophen (TYLENOL) 500 MG tablet Take 2 tablets (1,000 mg) by mouth at onset of pain or headache, may repeat if needed.    ALPRAZolam (XANAX) 0.5 MG tablet Take 1 tablet (0.5 mg total) by mouth 2 (two) times daily as needed for Anxiety.    apixaban (ELIQUIS) 5 mg Tab Take 1 tablet (5 mg total) by mouth 2 (two) times daily.    atorvastatin (LIPITOR) 40 MG tablet Take 1 tablet (40 mg total) by mouth once daily.    b complex vitamins capsule Take 1 capsule by mouth once daily.    colchicine (COLCRYS) 0.6 mg tablet For gout attack: Take TWO tablets by mouth, then, 2 hours later, take ONE additional tablet. Then take 1 tablet twice daily only if still needed for gout pain. (Patient taking differently: For gout attack: Take TWO tablets by mouth, then, 2 hours later, take ONE additional tablet. Then take 1 tablet twice daily only if  still needed for gout pain.)    DULoxetine (CYMBALTA) 60 MG capsule Take 2 capsules (120 mg total) by mouth once daily.    ferrous sulfate (SLOW RELEASE IRON) 142 mg (45 mg iron) TbSR Take 1 tablet (142 mg total) by mouth once daily. FOR 7 DAY THEN INCREASE TO TWICE DAILY AS TOLERATED    furosemide (LASIX) 40 MG tablet Take 1 tablet (40 mg total) by mouth 2 (two) times daily. Resume as needed for edema until followup with your PCP.    gluc-shikha-[facility]#[id number] 750 mg-644 mg- 30 mg-1 mg Tab Take 1 tablet by mouth once daily.     krill/om-3/dha/epa/phospho/ast (MEGARED OMEGA-3 KRILL OIL ORAL) Take 1 capsule by mouth once daily.    lisinopriL (PRINIVIL,ZESTRIL) 40 MG tablet Take 0.5 tablets (20 mg total) by mouth once daily.    lisinopriL (PRINIVIL,ZESTRIL) 40 MG tablet Take 1 tablet (40 mg total) by mouth once daily.    melatonin 10 mg Tab Take 1-2 tablets by mouth nightly as needed (Sleep).    metoprolol succinate (TOPROL-XL) 50 MG 24 hr tablet Take 1 tablet (50 mg total) by mouth 2 (two) times daily.    multivitamin (THERAGRAN) per tablet Take 1 tablet by mouth once daily.    NIFEdipine (PROCARDIA-XL) 90 MG (OSM) 24 hr tablet Take 1 tablet (90 mg total) by mouth once daily. HOLD UNTIL FOLLOW-UP WITH YOUR PCP.    omeprazole (PRILOSEC) 20 MG capsule TAKE 1 CAPSULE BY MOUTH ONCE DAILY    ondansetron (ZOFRAN-ODT) 4 MG TbDL Take 2 tablets (8 mg total) by mouth every 8 (eight) hours as needed (nasuea, vomiting).    polyethylene glycol (GLYCOLAX) 17 gram PwPk Take by mouth as needed (constipation).    potassium chloride SA (K-DUR,KLOR-CON) 20 MEQ tablet Take 1 tablet (20 mEq total) by mouth once daily. ONLY TAKE WITH LASIX.    progesterone (PROMETRIUM) 200 MG capsule Take 1 capsule (200 mg total) by mouth 2 (two) times a day.    propafenone (RYTHMOL) 225 MG Tab Take 1 tablet (225 mg total) by mouth every 8 (eight) hours.    traZODone (DESYREL) 100 MG tablet Take 1 tablet (100 mg total) by mouth nightly as needed for  "Insomnia.    UNABLE TO FIND Take 1 tablet by mouth once daily. Trino's Leg Cramps     Facility-Administered Medications Ordered in Other Encounters   Medication    sodium chloride 0.9% bolus 1,000 mL     Family History       Problem Relation (Age of Onset)    Cataracts Mother    Diabetes Father    Hypertension Mother, Father, Sister, Brother    Thyroid disease Mother          Tobacco Use    Smoking status: Former Smoker     Types: Cigarettes     Quit date: 2019     Years since quittin.8    Smokeless tobacco: Never Used   Substance and Sexual Activity    Alcohol use: No    Drug use: No    Sexual activity: Not on file     ROS  Constitutional: +fevers  Eyes: negative visual changes  ENT: negative for hearing loss  Respiratory: negative for dyspnea  Cardiovascular: negative for chest pain  Gastrointestinal: negative for abdominal pain  Genitourinary: negative for dysuria  Neurological: negative for headaches  Behavioral/Psych: negative for hallucinations  Endocrine: negative for temperature intolerance  MSK: left leg/anterior knee redness, swelling, pain  Objective:     Vital Signs (Most Recent):  Temp: 98.4 °F (36.9 °C) (22)  Pulse: 82 (22)  Resp: (!) 24 (22)  BP: 111/71 (22)  SpO2: 96 % (22)   Vital Signs (24h Range):  Temp:  [98 °F (36.7 °C)-98.4 °F (36.9 °C)] 98.4 °F (36.9 °C)  Pulse:  [] 82  Resp:  [16-24] 24  SpO2:  [92 %-99 %] 96 %  BP: (111-130)/(65-76) 111/71     Weight: 130.6 kg (288 lb)  Height: 5' 6" (167.6 cm)  Body mass index is 46.48 kg/m².    No intake or output data in the 24 hours ending 22    Ortho/SPM Exam  General Exam  General appearance: A&Ox3. NAD. Morbidly obese  HEENT: Normocephalic, head atraumatic. Conjuntiva normal. EOMI. External appearance of nose, mouth, ears wnl.  Neck: Supple. Trachea midline.  Respiratory: Breathing unlabored on room air. Symmetric chest rise.   CV: Extremities warm and well " "perfused.  Neurologic: No focal motor or sensory deficits.   Psychatric: A&Ox3. Appropriate mood and affect.  Skin: Warm, dry, well perfused.     Left upper extremity  Inspection: no swelling, lacerations, abrasions, contusions, or deformity  Palpation: no TTP, no palpable abnormality, compartments soft and compressible  ROM: full, painless passive and active ROM of shoulder, elbow, wrist, and fingers  Neuro: 5/5 flexion/extension of shoulder, elbow, wrist. Strong hand . Able to give thumbs up, make "OK" sign, cross IF/LF, abduct/adduct fingers, make fist. SILT axillary, radial, median, ulnar.  Vascular: 2+ radial pulse, fingers WWP     Right upper extremity  Inspection: no swelling, lacerations, abrasions, contusions, or deformity  Palpation: no TTP, no palpable abnormality, compartments soft and compressible  ROM: full, painless passive and active Torres f shoulder, elbow, wrist, and fingers  Neuro: 5/5 flexion/extension of shoulder, elbow, wrist. Strong hand . Able to give thumbs up, make "OK" sign, cross IF/LF, abduct/adduct fingers, make fist. SILT axillary, radial, median, ulnar.  Vascular: 2+ radial pulse, fingers WWP     Left lower extremity  Inspection: erythema, swelling to left leg and anterior knee. No global knee redness/swelling.  Palpation: mild TTP at leg and anterior knee, no knee joint line tenderness,compartments soft and compressible  ROM: full, painless passive and active ROM of hip, knee, ankle, toes. No pain with axial load of knee  Neuro: 5/5 flexion/extension of hip, knee, ankle, hallux. SILT throughout.   Vascular: 2+ DP pulse, toes WWP     Right lower extremity  Inspection: no swelling, lacerations, abrasions, contusions, or deformity  Palpation: no TTP, no palpable abnormality, compartments soft and compressible  ROM: full, painless passive and active ROM of hip, knee, ankle, toes  Neuro: 5/5 flexion/extension of hip, knee, ankle, hallux. SILT throughout.   Vascular: 2+ DP pulse, " toes WWP        Significant Labs: BMP:   Recent Labs   Lab 05/07/22  1247   *   *   K 5.2*      CO2 19*   BUN 9   CREATININE 0.9   CALCIUM 9.1     CBC:   Recent Labs   Lab 05/07/22  1247   WBC 8.38   HGB 10.2*   HCT 31.9*        CMP:   Recent Labs   Lab 05/07/22  1247   *   K 5.2*      CO2 19*   *   BUN 9   CREATININE 0.9   CALCIUM 9.1   PROT 7.7   ALBUMIN 3.3*   BILITOT 0.8   ALKPHOS 98   AST 47*   ALT 17   ANIONGAP 15   EGFRNONAA >60.0     Coagulation: No results for input(s): LABPROT, INR, APTT in the last 48 hours.  CRP:   Recent Labs   Lab 05/07/22  1247   .9*     All pertinent labs within the past 24 hours have been reviewed.    Significant Imaging: I have reviewed all pertinent imaging results/findings.    CT left leg w/contrast reviewed- knee effusion, prepatellar bursitis  XR left knee pending  DVT US negative

## 2022-05-08 NOTE — CONSULTS
[facility] Dept  General Surgery  Consult Note    Patient Name: Vicky Alvarado  MRN: [id number]  Code Status: Full Code  Admission Date: 5/7/2022  Hospital Length of Stay: 0 days  Attending Physician: Reed Zuleta MD  Primary Care Provider: Gordy Cordero MD    Patient information was obtained from patient, past medical records and ER records.     Inpatient consult to General surgery  Consult performed by: GUIDO Carpio MD  Consult ordered by: Michael Elizabeth MD  Reason for consult: nec fasc        Subjective:     Principal Problem: Cellulitis of extremity    History of Present Illness: 57F with pre-diabetes (Hgb a1c is 5.7%), HTN, CHF, Afib presents with rash for two days on her left lower extremity. Spreading. Painful. Febrile at home to 101.    Complaint of her knee giving out this morning and knee pain. Seems mostly superficial, but hard for her to commit to exclude the joint itself having different pain today from her chronic pain.      Current Facility-Administered Medications on File Prior to Encounter   Medication    sodium chloride 0.9% bolus 1,000 mL     Current Outpatient Medications on File Prior to Encounter   Medication Sig    acetaminophen (TYLENOL) 500 MG tablet Take 2 tablets (1,000 mg) by mouth at onset of pain or headache, may repeat if needed.    ALPRAZolam (XANAX) 0.5 MG tablet Take 1 tablet (0.5 mg total) by mouth 2 (two) times daily as needed for Anxiety.    apixaban (ELIQUIS) 5 mg Tab Take 1 tablet (5 mg total) by mouth 2 (two) times daily.    atorvastatin (LIPITOR) 40 MG tablet Take 1 tablet (40 mg total) by mouth once daily.    b complex vitamins capsule Take 1 capsule by mouth once daily.    colchicine (COLCRYS) 0.6 mg tablet For gout attack: Take TWO tablets by mouth, then, 2 hours later, take ONE additional tablet. Then take 1 tablet twice daily only if still needed for gout pain. (Patient taking differently: For gout attack: Take TWO tablets by mouth, then, 2  hours later, take ONE additional tablet. Then take 1 tablet twice daily only if still needed for gout pain.)    DULoxetine (CYMBALTA) 60 MG capsule Take 2 capsules (120 mg total) by mouth once daily.    ferrous sulfate (SLOW RELEASE IRON) 142 mg (45 mg iron) TbSR Take 1 tablet (142 mg total) by mouth once daily. FOR 7 DAY THEN INCREASE TO TWICE DAILY AS TOLERATED    furosemide (LASIX) 40 MG tablet Take 1 tablet (40 mg total) by mouth 2 (two) times daily. Resume as needed for edema until followup with your PCP.    gluc-shikha-[facility] [place]#0-N-srro-reymundo-bor 750 mg-644 mg- 30 mg-1 mg Tab Take 1 tablet by mouth once daily.     krill/om-3/dha/epa/phospho/ast (MEGARED OMEGA-3 KRILL OIL ORAL) Take 1 capsule by mouth once daily.    lisinopriL (PRINIVIL,ZESTRIL) 40 MG tablet Take 0.5 tablets (20 mg total) by mouth once daily.    lisinopriL (PRINIVIL,ZESTRIL) 40 MG tablet Take 1 tablet (40 mg total) by mouth once daily.    melatonin 10 mg Tab Take 1-2 tablets by mouth nightly as needed (Sleep).    metoprolol succinate (TOPROL-XL) 50 MG 24 hr tablet Take 1 tablet (50 mg total) by mouth 2 (two) times daily.    multivitamin (THERAGRAN) per tablet Take 1 tablet by mouth once daily.    NIFEdipine (PROCARDIA-XL) 90 MG (OSM) 24 hr tablet Take 1 tablet (90 mg total) by mouth once daily. HOLD UNTIL FOLLOW-UP WITH YOUR PCP.    omeprazole (PRILOSEC) 20 MG capsule TAKE 1 CAPSULE BY MOUTH ONCE DAILY    ondansetron (ZOFRAN-ODT) 4 MG TbDL Take 2 tablets (8 mg total) by mouth every 8 (eight) hours as needed (nasuea, vomiting).    polyethylene glycol (GLYCOLAX) 17 gram PwPk Take by mouth as needed (constipation).    potassium chloride SA (K-DUR,KLOR-CON) 20 MEQ tablet Take 1 tablet (20 mEq total) by mouth once daily. ONLY TAKE WITH LASIX.    progesterone (PROMETRIUM) 200 MG capsule Take 1 capsule (200 mg total) by mouth 2 (two) times a day.    propafenone (RYTHMOL) 225 MG Tab Take 1 tablet (225 mg total) by mouth every 8 (eight) hours.     traZODone (DESYREL) 100 MG tablet Take 1 tablet (100 mg total) by mouth nightly as needed for Insomnia.    UNABLE TO FIND Take 1 tablet by mouth once daily. Trino's Leg Cramps    [DISCONTINUED] pantoprazole (PROTONIX) 40 MG tablet Take 1 tablet (40 mg total) by mouth once daily. (Patient not taking: Reported on 2/18/2022)       Review of patient's allergies indicates:   Allergen Reactions    Flecainide Shortness Of Breath and Swelling       Past Medical History:   Diagnosis Date    Anticoagulant long-term use     Arthritis     Atrial fibrillation     cardioversion    Atrial fibrillation with RVR     Avascular necrosis     L hand    CHF (congestive heart failure)     Depression     Encounter for blood transfusion 07/22/2020    GERD (gastroesophageal reflux disease)     Hx of psychiatric care     Hypertension     Iron deficiency anemia 5/7/2022    TIBC 444 with saturated iron 8    Obese     Pre-diabetes 5/7/2022    Psychiatric problem     Sleep apnea      Past Surgical History:   Procedure Laterality Date    ABLATION OF ARRHYTHMOGENIC FOCUS FOR ATRIAL FIBRILLATION N/A 7/20/2020    Procedure: Ablation atrial fibrillation;  Surgeon: Gabriel Hawley MD;  Location: [facility] EP LAB;  Service: Cardiology;  Laterality: N/A;  afib, PVI, RFA, REENA, SHADY, anes, MB, 3 Prep    ABLATION OF ARRHYTHMOGENIC FOCUS FOR ATRIAL FIBRILLATION N/A 1/24/2022    Procedure: Ablation atrial fibrillation;  Surgeon: Gabriel Hawley MD;  Location: [facility] EP LAB;  Service: Cardiology;  Laterality: N/A;  afib/afl, PVI (re-do)/CTI, RFA, REENA (cx if SR), SHADY, anes, MB, 3 Prep    APPLICATION OF WOUND VACUUM-ASSISTED CLOSURE DEVICE Left 8/3/2020    Procedure: APPLICATION, WOUND VAC;  Surgeon: SEMAJ Márquez III, MD;  Location: [facility] OR [address] [place] [address];  Service: Peripheral Vascular;  Laterality: Left;    APPLICATION OF WOUND VACUUM-ASSISTED CLOSURE DEVICE Left 8/6/2020    Procedure: APPLICATION, WOUND VAC;  Surgeon: SEMAJ Márquez  III, MD;  Location: [facility] OR [facility] FLR;  Service: Peripheral Vascular;  Laterality: Left;    CARPAL TUNNEL RELEASE Right 6/10/2020    Procedure: RELEASE, CARPAL TUNNEL - RIGHT;  Surgeon: Adelaida Hall MD;  Location: [facility] OR;  Service: Orthopedics;  Laterality: Right;  GENERAL AND REGIONAL    CARPAL TUNNEL RELEASE Left 5/5/2021    Procedure: RELEASE, CARPAL TUNNEL, LEFT;  Surgeon: Adelaida Hall MD;  Location: [facility] OR;  Service: Orthopedics;  Laterality: Left;  GENERAL & REGIONAL IN PACU    CLOSURE OF WOUND Left 8/6/2020    Procedure: CLOSURE, WOUND;  Surgeon: SEMAJ Márquez III, MD;  [facility];  Service: Peripheral Vascular;  Laterality: Left;  Complex    COLONOSCOPY N/A 8/17/2021    Procedure: COLONOSCOPY Suprep;  Surgeon: Loco Deluca MD;  Location: [facility];  Service: Endoscopy;  Laterality: N/A;    ESOPHAGOGASTRODUODENOSCOPY N/A 8/17/2021    Procedure: EGD (ESOPHAGOGASTRODUODENOSCOPY);  Surgeon: Loco Deluca MD;  Location: [facility];  Service: Endoscopy;  Laterality: N/A;  Patient is schedule to have her Covid test on 08/14/2021 at the [facility] @ 9:30am. [place].    EXPLORATION OF FEMORAL ARTERY Left 7/21/2020    Procedure: EXPLORATION, ARTERY, FEMORAL;  Surgeon: SEMAJ Márquez III, MD;  [facility];  Service: Peripheral Vascular;  Laterality: Left;  1. Urgent Direct repair, L SFA branch laceration    FOOT SURGERY      HYSTEROSCOPY WITH DILATION AND CURETTAGE OF UTERUS N/A 2/19/2022    Procedure: HYSTEROSCOPY, WITH DILATION AND CURETTAGE OF UTERUS;  Surgeon: Shane Palomo MD;  [facility];  Service: OB/GYN;  Laterality: N/A;    TREATMENT OF CARDIAC ARRHYTHMIA N/A 1/29/2020    Procedure: CARDIOVERSION;  Surgeon: Gabriel aHwley MD;  Location: [facility] EP LAB;  Service: Cardiology;  Laterality: N/A;  af, demi, dccv, anes, mb, 345    TREATMENT OF CARDIAC ARRHYTHMIA  1/24/2022    Procedure: Cardioversion or Defibrillation;   Surgeon: Gabriel Hawley MD;  Location: [facility] EP LAB;  Service: Cardiology;;    WOUND DEBRIDEMENT Left 2020    Procedure: DEBRIDEMENT, WOUND;  Surgeon: SEMAJ Márquez III, MD;  Location: [facility] OR [address] [place] [address];  Service: Peripheral Vascular;  Laterality: Left;     Family History       Problem Relation (Age of Onset)    Cataracts Mother    Diabetes Father    Hypertension Mother, Father, Sister, Brother    Thyroid disease Mother          Tobacco Use    Smoking status: Former Smoker     Types: Cigarettes     Quit date: 2019     Years since quittin.8    Smokeless tobacco: Never Used   Substance and Sexual Activity    Alcohol use: No    Drug use: No    Sexual activity: Not on file     Review of Systems   Constitutional:  Positive for activity change and fever. Negative for chills and unexpected weight change.   HENT: Negative.     Eyes: Negative.    Respiratory: Negative.     Cardiovascular: Negative.  Negative for chest pain and leg swelling.   Gastrointestinal: Negative.    Endocrine: Negative.    Genitourinary: Negative.    Musculoskeletal:  Positive for arthralgias, joint swelling and myalgias.   Skin:  Positive for rash.   Allergic/Immunologic: Negative.    Neurological: Negative.    Hematological: Negative.    Psychiatric/Behavioral: Negative.     Objective:     Vital Signs (Most Recent):  Temp: 98.3 °F (36.8 °C) (22)  Pulse: 96 (22)  Resp: (!) 22 (22)  BP: 114/76 (22)  SpO2: (!) 92 % (22)   Vital Signs (24h Range):  Temp:  [98 °F (36.7 °C)-98.4 °F (36.9 °C)] 98.3 °F (36.8 °C)  Pulse:  [] 96  Resp:  [16-23] 22  SpO2:  [92 %-99 %] 92 %  BP: (114-130)/(65-76) 114/76     Weight: 130.6 kg (288 lb)  Body mass index is 46.48 kg/m².    Physical Exam  Vitals reviewed.   Cardiovascular:      Rate and Rhythm: Normal rate.      Pulses: Normal pulses.   Pulmonary:      Effort: Pulmonary effort is normal.   Abdominal:      Palpations: Abdomen is soft.    Musculoskeletal:         General: No swelling.   Skin:     General: Skin is dry.      Capillary Refill: Capillary refill takes less than 2 seconds.      Findings: Rash present.      Comments: Marked extent of erythema late afternoon in the ED    Erythema around her left knee extending down to the superior shin  No crepitus  No drainage  No abscess    She has separate area of erythema at her left medial thigh adjacent to the vulva   Neurological:      General: No focal deficit present.      Mental Status: She is alert and oriented to person, place, and time.   Psychiatric:         Mood and Affect: Mood normal.       Significant Labs:  I have reviewed all pertinent lab results within the past 24 hours.  CBC:   Recent Labs   Lab 05/07/22  1247   WBC 8.38   RBC 3.71*   HGB 10.2*   HCT 31.9*      MCV 86   MCH 27.5   MCHC 32.0     CMP:   Recent Labs   Lab 05/07/22  1247   *   CALCIUM 9.1   ALBUMIN 3.3*   PROT 7.7   *   K 5.2*   CO2 19*      BUN 9   CREATININE 0.9   ALKPHOS 98   ALT 17   AST 47*   BILITOT 0.8     Microbiology Results (last 7 days)       Procedure Component Value Units Date/Time    Blood Culture #1 **CANNOT BE ORDERED STAT** [[id number]] Collected: 05/07/22 1246    Order Status: Completed Specimen: Blood from Peripheral, Antecubital, Left Updated: 05/07/22 1915     Blood Culture, Routine No Growth to date    Blood Culture #2 **CANNOT BE ORDERED STAT** [[id number]] Collected: 05/07/22 1258    Order Status: Completed Specimen: Blood from Peripheral, Antecubital, Right Updated: 05/07/22 1915     Blood Culture, Routine No Growth to date          No results for input(s): COLORU, CLARITYU, SPECGRAV, PHUR, PROTEINUA, GLUCOSEU, BILIRUBINCON, BLOODU, WBCU, RBCU, BACTERIA, MUCUS, NITRITE, LEUKOCYTESUR, UROBILINOGEN, HYALINECASTS in the last 168 hours.    Significant Diagnostics:  I have reviewed all pertinent imaging results/findings within the past 24 hours.  CT: I have reviewed all  pertinent results/findings within the past 24 hours and my personal findings are:  consistent with cellulitis, fluid around left knee joint that is indeterminant      Assessment/Plan:     * Cellulitis of extremity  57F with cellulitis centered around left knee and extending to upper shin. Also cellulitis in her left medial thigh.    Gen surg consulted for nec fasc. No crepitus or drainage on exam. LRINEC score is 4 for . CT shows no air, but there is some fluid around the knee.    I do not think she has nec fasc.     I marked out the extent of the erythema late afternoon on 5/7.    Recommend broad spec antibiotics and an antifungal for the left medial thigh.    Will follow to ensure erythema improves on antibiotics.    Consider ortho consult for fluid around the knee.        VTE Risk Mitigation (From admission, onward)         Ordered     apixaban tablet 5 mg  2 times daily         05/07/22 2000     IP VTE HIGH RISK PATIENT  Once         05/07/22 1953     Place sequential compression device  Until discontinued         05/07/22 1953                Thank you for your consult. I will follow-up with patient. Please contact us if you have any additional questions.    SEMAJ Carpio MD  General Surgery  [facility]

## 2022-05-08 NOTE — PLAN OF CARE
[facility] - Telemetry Stepdown ([place]-[facility])  Initial Discharge Assessment       Primary Care Provider: Gordy Cordero MD    Admission Diagnosis: Leg pain [M79.606]  Leg swelling [M79.89]  Cellulitis of left lower extremity [L03.116]  Chest pain [R07.9]    Admission Date: 5/7/2022  Expected Discharge Date:     Discharge Barriers Identified: (P) None    Payor: BLUE CROSS [employer]EFIT / Plan: [employer] BLUE CROSS [place] / Product Type: Self Funded /     Extended Emergency Contact Information  Primary Emergency Contact: Zully Arita  Address: [address] [place] [address] [id number] [place] [address] [place]  Home Phone: [phone]  Mobile Phone: [phone]  Relation: Sister    Discharge Plan A: (P) Home Health, Home with family ([facility]/ [facility] (would like reassigned if needed))  Discharge Plan B: (P) Home      [facility] [place] [address] [facility] [address]  Phone: [phone] Fax: [phone]    [facility]  [address] [facility] [address]  [facility] [address]  Phone: [phone] Fax: [phone]      Initial Assessment (most recent)       Adult Discharge Assessment - 05/08/22 1616          Discharge Assessment    Assessment Type Discharge Planning Assessment (P)      Confirmed/corrected address, phone number and insurance Yes (P)      Confirmed Demographics Correct on Facesheet (P)      Source of Information patient (P)    completed at     Does patient/caregiver understand observation status Yes (P)      Communicated RAYO with patient/caregiver Yes (P)      Reason For Admission leg pain (P)      Lives With alone (P)      Facility Arrived From: home (P)      Do you expect to return to your current living situation? Yes (P)      Do you have help at home or someone to help you manage your care at home? Yes (P)      Who are your caregiver(s) and their phone number(s)? Zully Arita Sister  (P)      Prior to hospitilization cognitive status: Alert/Oriented  (P)      Current cognitive status: Alert/Oriented (P)      Walking or Climbing Stairs Difficulty other (see comments) (P)    no stairs in home    Dressing/Bathing Difficulty none (P)      Home Accessibility wheelchair accessible (P)      Home Layout Able to live on 1st floor (P)      Equipment Currently Used at Home walker, rolling;bedside commode;CPAP (P)    electric scooter    Readmission within 30 days? No (P)      Patient currently being followed by outpatient case management? No (P)      Do you currently have service(s) that help you manage your care at home? No (P)      Do you take prescription medications? Yes (P)      Do you have prescription coverage? Yes (P)      Do you have any problems affording any of your prescribed medications? No (P)      Is the patient taking medications as prescribed? yes (P)      How do you get to doctors appointments? family or friend will provide (P)      Are you on dialysis? No (P)      Do you take coumadin? Yes (P)      Who monitors your labs? PCP monitors labs (P)      Discharge Plan A Home Health;Home with family (P)    [place]/ [facility] (would like reassigned if needed)    Discharge Plan B Home (P)      DME Needed Upon Discharge  other (see comments) (P)    TBD    Discharge Plan discussed with: Patient (P)    At bedside    Discharge Barriers Identified None (P)                    Ernestine Coon LMSW  Case Management Social Worker   [facility] [address]

## 2022-05-08 NOTE — CONSULTS
[facility] - Tele[facility] Stepdown ([place] [address])  Orthopedics  Consult Note    Patient Name: Vicky Alvarado  MRN: [id number]  Admission Date: 5/7/2022  Hospital Length of Stay: 0 days  Attending Provider: Reed Zuleta MD  Primary Care Provider: Gordy Cordero MD    Patient information was obtained from patient, chart    Inpatient consult to Orthopedics  Consult performed by: Adriane Stanton MD  Consult ordered by: Reed Zuleta MD        Subjective:     Principal Problem:Left leg cellulitis    Chief Complaint:   Chief Complaint   Patient presents with    Cellulitis     101 temp yesterday.         HPI: 57F pmhx a-fib, CHF, and obesity presents for left lower extremity erythema, swelling and pain; ortho consulted to r/o left knee septic arthritis. Patient reports on 5/5/22 woke up and noticed left lower leg and left anterior knee were red. This progressed throughout the day. On 5/6/22 symptoms continued and had fever to 101, went to Urgent Care however was not seen. Today (5/7/22) symptoms continued to worsen so came to ER. Having swelling and erythema to left leg and anterior knee with mild associated pain. No global left knee pain, some pain in anterior knee with extreme of flexion. No pain with weight bearing. Has not tried any abx yet. No hx septic arthritis or recurrent infections. Deneis smoking, etoh, IVDU.            Past Medical History:   Diagnosis Date    Anticoagulant long-term use     Arthritis     Atrial fibrillation     cardioversion    Atrial fibrillation with RVR     Avascular necrosis     L hand    CHF (congestive heart failure)     Depression     Encounter for blood transfusion 07/22/2020    GERD (gastroesophageal reflux disease)     Hx of psychiatric care     Hypertension     Iron deficiency anemia 5/7/2022    TIBC 444 with saturated iron 8    Obese     Pre-diabetes 5/7/2022    Psychiatric problem     Sleep apnea        Past Surgical History:   Procedure Laterality Date     ABLATION OF ARRHYTHMOGENIC FOCUS FOR ATRIAL FIBRILLATION N/A 7/20/2020    Procedure: Ablation atrial fibrillation;  Surgeon: Gabriel Hawley MD;  Location: [facility] EP LAB;  Service: Cardiology;  Laterality: N/A;  afib, PVI, RFA, REENA, SHADY, anes, MB, 3 Prep    ABLATION OF ARRHYTHMOGENIC FOCUS FOR ATRIAL FIBRILLATION N/A 1/24/2022    Procedure: Ablation atrial fibrillation;  Surgeon: Gabriel Hawley MD;  Location: [facility] EP LAB;  Service: Cardiology;  Laterality: N/A;  afib/afl, PVI (re-do)/CTI, RFA, REENA (cx if SR), SHADY, anes, MB, 3 Prep    APPLICATION OF WOUND VACUUM-ASSISTED CLOSURE DEVICE Left 8/3/2020    Procedure: APPLICATION, WOUND VAC;  Surgeon: SEMAJ Márquez III, MD;  Location: [facility] OR 2ND FLR;  Service: Peripheral Vascular;  Laterality: Left;    APPLICATION OF WOUND VACUUM-ASSISTED CLOSURE DEVICE Left 8/6/2020    Procedure: APPLICATION, WOUND VAC;  Surgeon: SEMAJ Márquez III, MD;  Location: [facility] OR 2ND FLR;  Service: Peripheral Vascular;  Laterality: Left;    CARPAL TUNNEL RELEASE Right 6/10/2020    Procedure: RELEASE, CARPAL TUNNEL - RIGHT;  Surgeon: Adelaida Hall MD;  Location: [facility];  Service: Orthopedics;  Laterality: Right;  GENERAL AND REGIONAL    CARPAL TUNNEL RELEASE Left 5/5/2021    Procedure: RELEASE, CARPAL TUNNEL, LEFT;  Surgeon: Adelaida Hall MD;  Location: [facility] OR;  Service: Orthopedics;  Laterality: Left;  GENERAL & REGIONAL IN PACU    CLOSURE OF WOUND Left 8/6/2020    Procedure: CLOSURE, WOUND;  Surgeon: SEMAJ Márquez III, MD;  Location: [facility] OR 2ND FLR;  Service: Peripheral Vascular;  Laterality: Left;  Complex    COLONOSCOPY N/A 8/17/2021    Procedure: COLONOSCOPY Suprep;  Surgeon: Loco Deluca MD;  Location: [facility];  Service: Endoscopy;  Laterality: N/A;    ESOPHAGOGASTRODUODENOSCOPY N/A 8/17/2021    Procedure: EGD (ESOPHAGOGASTRODUODENOSCOPY);  Surgeon: Loco Deluca MD;  Location: [facility] ENDO;  Service: Endoscopy;  Laterality: N/A;   Patient is schedule [facility] [place]    EXPLORATION OF FEMORAL ARTERY Left 7/21/2020    Procedure: EXPLORATION, ARTERY, FEMORAL;  Surgeon: SEMAJ Márquez III, MD;  Location: [facility] OR [address] [place] [address];  Service: Peripheral Vascular;  Laterality: Left;  1. Urgent Direct repair, L SFA branch laceration    FOOT SURGERY      HYSTEROSCOPY WITH DILATION AND CURETTAGE OF UTERUS N/A 2/19/2022    Procedure: HYSTEROSCOPY, WITH DILATION AND CURETTAGE OF UTERUS;  Surgeon: Shane Palomo MD;  Location: [facility] OR [address] [place] [address];  Service: OB/GYN;  Laterality: N/A;    TREATMENT OF CARDIAC ARRHYTHMIA N/A 1/29/2020    Procedure: CARDIOVERSION;  Surgeon: Gabriel Hawley MD;  Location: [facility] EP LAB;  Service: Cardiology;  Laterality: N/A;  af, demi, dccv, anes, mb, 345    TREATMENT OF CARDIAC ARRHYTHMIA  1/24/2022    Procedure: Cardioversion or Defibrillation;  Surgeon: Gabriel Hawley MD;  Location: [facility] EP LAB;  Service: Cardiology;;    WOUND DEBRIDEMENT Left 8/6/2020    Procedure: DEBRIDEMENT, WOUND;  Surgeon: SEMAJ Márquez III, MD;  Location: [facility] OR [address] [place] [address];  Service: Peripheral Vascular;  Laterality: Left;       Review of patient's allergies indicates:   Allergen Reactions    Flecainide Shortness Of Breath and Swelling       Current Facility-Administered Medications   Medication    acetaminophen tablet 650 mg    ALPRAZolam tablet 0.5 mg    apixaban tablet 5 mg    [START ON 5/8/2022] atorvastatin tablet 40 mg    [START ON 5/8/2022] DULoxetine DR capsule 120 mg    furosemide tablet 40 mg    glucagon (human recombinant) injection 1 mg    glucose chewable tablet 16 g    glucose chewable tablet 24 g    [START ON 5/8/2022] lisinopriL tablet 20 mg    melatonin tablet 6 mg    [START ON 5/8/2022] metoprolol succinate (TOPROL-XL) 24 hr tablet 50 mg    miconazole NITRATE 2 % top powder    [START ON 5/8/2022] multivitamin tablet    naloxone 0.4 mg/mL injection 0.02 mg     [START ON 5/8/2022] NIFEdipine 24 hr tablet 90 mg    ondansetron disintegrating tablet 8 mg    oxyCODONE immediate release tablet 5 mg    [START ON 5/8/2022] pantoprazole EC tablet 40 mg    piperacillin-tazobactam 4.5 g in sodium chloride 0.9% 100 mL IVPB (ready to mix system)    polyethylene glycol packet 17 g    [START ON 5/8/2022] propafenone tablet 225 mg    traZODone tablet 50 mg    vancomycin - pharmacy to dose    [START ON 5/8/2022] vancomycin 1.75 g in 5 % dextrose 500 mL IVPB     Current Outpatient Medications   Medication Sig    acetaminophen (TYLENOL) 500 MG tablet Take 2 tablets (1,000 mg) by mouth at onset of pain or headache, may repeat if needed.    ALPRAZolam (XANAX) 0.5 MG tablet Take 1 tablet (0.5 mg total) by mouth 2 (two) times daily as needed for Anxiety.    apixaban (ELIQUIS) 5 mg Tab Take 1 tablet (5 mg total) by mouth 2 (two) times daily.    atorvastatin (LIPITOR) 40 MG tablet Take 1 tablet (40 mg total) by mouth once daily.    b complex vitamins capsule Take 1 capsule by mouth once daily.    colchicine (COLCRYS) 0.6 mg tablet For gout attack: Take TWO tablets by mouth, then, 2 hours later, take ONE additional tablet. Then take 1 tablet twice daily only if still needed for gout pain. (Patient taking differently: For gout attack: Take TWO tablets by mouth, then, 2 hours later, take ONE additional tablet. Then take 1 tablet twice daily only if still needed for gout pain.)    DULoxetine (CYMBALTA) 60 MG capsule Take 2 capsules (120 mg total) by mouth once daily.    ferrous sulfate (SLOW RELEASE IRON) 142 mg (45 mg iron) TbSR Take 1 tablet (142 mg total) by mouth once daily. FOR 7 DAY THEN INCREASE TO TWICE DAILY AS TOLERATED    furosemide (LASIX) 40 MG tablet Take 1 tablet (40 mg total) by mouth 2 (two) times daily. Resume as needed for edema until followup with your PCP.    gluc-shikha-[facility]#7-R-ryzx-reymundo-bor 750 mg-644 mg- 30 mg-1 mg Tab Take 1 tablet by mouth once daily.      krill/om-3/dha/epa/phospho/ast (MEGARED OMEGA-3 KRILL OIL ORAL) Take 1 capsule by mouth once daily.    lisinopriL (PRINIVIL,ZESTRIL) 40 MG tablet Take 0.5 tablets (20 mg total) by mouth once daily.    lisinopriL (PRINIVIL,ZESTRIL) 40 MG tablet Take 1 tablet (40 mg total) by mouth once daily.    melatonin 10 mg Tab Take 1-2 tablets by mouth nightly as needed (Sleep).    metoprolol succinate (TOPROL-XL) 50 MG 24 hr tablet Take 1 tablet (50 mg total) by mouth 2 (two) times daily.    multivitamin (THERAGRAN) per tablet Take 1 tablet by mouth once daily.    NIFEdipine (PROCARDIA-XL) 90 MG (OSM) 24 hr tablet Take 1 tablet (90 mg total) by mouth once daily. HOLD UNTIL FOLLOW-UP WITH YOUR PCP.    omeprazole (PRILOSEC) 20 MG capsule TAKE 1 CAPSULE BY MOUTH ONCE DAILY    ondansetron (ZOFRAN-ODT) 4 MG TbDL Take 2 tablets (8 mg total) by mouth every 8 (eight) hours as needed (nasuea, vomiting).    polyethylene glycol (GLYCOLAX) 17 gram PwPk Take by mouth as needed (constipation).    potassium chloride SA (K-DUR,KLOR-CON) 20 MEQ tablet Take 1 tablet (20 mEq total) by mouth once daily. ONLY TAKE WITH LASIX.    progesterone (PROMETRIUM) 200 MG capsule Take 1 capsule (200 mg total) by mouth 2 (two) times a day.    propafenone (RYTHMOL) 225 MG Tab Take 1 tablet (225 mg total) by mouth every 8 (eight) hours.    traZODone (DESYREL) 100 MG tablet Take 1 tablet (100 mg total) by mouth nightly as needed for Insomnia.    UNABLE TO FIND Take 1 tablet by mouth once daily. Trino's Leg Cramps     Facility-Administered Medications Ordered in Other Encounters   Medication    sodium chloride 0.9% bolus 1,000 mL     Family History       Problem Relation (Age of Onset)    Cataracts Mother    Diabetes Father    Hypertension Mother, Father, Sister, Brother    Thyroid disease Mother          Tobacco Use    Smoking status: Former Smoker     Types: Cigarettes     Quit date: 2019     Years since quittin.8    Smokeless tobacco:  "Never Used   Substance and Sexual Activity    Alcohol use: No    Drug use: No    Sexual activity: Not on file     ROS  Constitutional: +fevers  Eyes: negative visual changes  ENT: negative for hearing loss  Respiratory: negative for dyspnea  Cardiovascular: negative for chest pain  Gastrointestinal: negative for abdominal pain  Genitourinary: negative for dysuria  Neurological: negative for headaches  Behavioral/Psych: negative for hallucinations  Endocrine: negative for temperature intolerance  MSK: left leg/anterior knee redness, swelling, pain  Objective:     Vital Signs (Most Recent):  Temp: 98.4 °F (36.9 °C) (05/07/22 2233)  Pulse: 82 (05/07/22 2233)  Resp: (!) 24 (05/07/22 2233)  BP: 111/71 (05/07/22 2233)  SpO2: 96 % (05/07/22 2233)   Vital Signs (24h Range):  Temp:  [98 °F (36.7 °C)-98.4 °F (36.9 °C)] 98.4 °F (36.9 °C)  Pulse:  [] 82  Resp:  [16-24] 24  SpO2:  [92 %-99 %] 96 %  BP: (111-130)/(65-76) 111/71     Weight: 130.6 kg (288 lb)  Height: 5' 6" (167.6 cm)  Body mass index is 46.48 kg/m².    No intake or output data in the 24 hours ending 05/07/22 2328    Ortho/SPM Exam  General Exam  General appearance: A&Ox3. NAD. Morbidly obese  HEENT: Normocephalic, head atraumatic. Conjuntiva normal. EOMI. External appearance of nose, mouth, ears wnl.  Neck: Supple. Trachea midline.  Respiratory: Breathing unlabored on room air. Symmetric chest rise.   CV: Extremities warm and well perfused.  Neurologic: No focal motor or sensory deficits.   Psychatric: A&Ox3. Appropriate mood and affect.  Skin: Warm, dry, well perfused.     Left upper extremity  Inspection: no swelling, lacerations, abrasions, contusions, or deformity  Palpation: no TTP, no palpable abnormality, compartments soft and compressible  ROM: full, painless passive and active ROM of shoulder, elbow, wrist, and fingers  Neuro: 5/5 flexion/extension of shoulder, elbow, wrist. Strong hand . Able to give thumbs up, make "OK" sign, cross IF/LF, " "abduct/adduct fingers, make fist. SILT axillary, radial, median, ulnar.  Vascular: 2+ radial pulse, fingers WWP     Right upper extremity  Inspection: no swelling, lacerations, abrasions, contusions, or deformity  Palpation: no TTP, no palpable abnormality, compartments soft and compressible  ROM: full, painless passive and active Torres f shoulder, elbow, wrist, and fingers  Neuro: 5/5 flexion/extension of shoulder, elbow, wrist. Strong hand . Able to give thumbs up, make "OK" sign, cross IF/LF, abduct/adduct fingers, make fist. SILT axillary, radial, median, ulnar.  Vascular: 2+ radial pulse, fingers WWP     Left lower extremity  Inspection: erythema, swelling to left leg and anterior knee. No global knee redness/swelling.  Palpation: mild TTP at leg and anterior knee, no knee joint line tenderness,compartments soft and compressible  ROM: full, painless passive and active ROM of hip, knee, ankle, toes. No pain with axial load of knee  Neuro: 5/5 flexion/extension of hip, knee, ankle, hallux. SILT throughout.   Vascular: 2+ DP pulse, toes WWP     Right lower extremity  Inspection: no swelling, lacerations, abrasions, contusions, or deformity  Palpation: no TTP, no palpable abnormality, compartments soft and compressible  ROM: full, painless passive and active ROM of hip, knee, ankle, toes  Neuro: 5/5 flexion/extension of hip, knee, ankle, hallux. SILT throughout.   Vascular: 2+ DP pulse, toes WWP        Significant Labs: BMP:   Recent Labs   Lab 05/07/22  1247   *   *   K 5.2*      CO2 19*   BUN 9   CREATININE 0.9   CALCIUM 9.1     CBC:   Recent Labs   Lab 05/07/22  1247   WBC 8.38   HGB 10.2*   HCT 31.9*        CMP:   Recent Labs   Lab 05/07/22  1247   *   K 5.2*      CO2 19*   *   BUN 9   CREATININE 0.9   CALCIUM 9.1   PROT 7.7   ALBUMIN 3.3*   BILITOT 0.8   ALKPHOS 98   AST 47*   ALT 17   ANIONGAP 15   EGFRNONAA >60.0     Coagulation: No results for input(s): " LABPROT, INR, APTT in the last 48 hours.  CRP:   Recent Labs   Lab 05/07/22  1247   .9*     All pertinent labs within the past 24 hours have been reviewed.    Significant Imaging: I have reviewed all pertinent imaging results/findings.    CT left leg w/contrast reviewed- knee effusion, prepatellar bursitis  XR left knee pending  DVT US negative    Assessment/Plan:     * Left leg cellulitis  57F with left knee and leg cellulitis, and likely left prepatellar bursitis. Tmax 101 at home, , WBC 8 (wnl),  (elevated),  (elevated),  lactate 3.9 (elevated). Based on clinical picture low concern for septic arthritis - no global erythema of knee, no joint line tenderness, painless ROM left knee on exam, no pain with weight bearing or axial load. History, exam and imaging consistent with cellulitis and prepatellar bursitis. Her bursitis is not causing any wounds or skin compromise and thus can be treated with trial of abx. Hospital medicine plans to admit and treat with IV abx.     Recs  -No acute orthopedic surgical intervention at this time  -IV abx per hospital medicine  -WBAT LLE  -If left knee exam worsens please contact ortho to evaluate             Adriane Stanton MD  Orthopedics  [facility] - Telemetry Stepdown ([place]-[facility])

## 2022-05-08 NOTE — ASSESSMENT & PLAN NOTE
57F with cellulitis centered around left knee and extending to upper shin. Also cellulitis in her left medial thigh.    Gen surg consulted for nec fasc. No crepitus or drainage on exam. LRINEC score is 4 for lactic acid. CT shows no air, but there is some fluid around the knee.    I do not think she has nec fasc.     I marked out the extent of the erythema late afternoon on 5/7.    Recommend broad spec antibiotics and an antifungal cream for the left medial thigh.    Will follow to ensure erythema improves on antibiotics.    Consider ortho consult for fluid around the knee.

## 2022-05-08 NOTE — ASSESSMENT & PLAN NOTE
57F with cellulitis centered around left knee and extending to upper shin. Also cellulitis in her left medial thigh.    Gen surg consulted for nec fasc. No crepitus or drainage on exam. LRINEC score is 4 for lactic acid. CT shows no air, but there is some fluid around the knee.    - patient seen and examined this morning  - labs reviewed  - cellulitis improving  - continue abx  - complains of worsening knee pain this morning-- ortho following  - rest of care per primary team  - general surgery to continue to follow

## 2022-05-08 NOTE — PROGRESS NOTES
[facility] - Tele[facility] Stepdown ([address] Medicine  Progress Note    Patient Name: Vicky Alvarado  MRN: [id number]  Patient Class: OP- Observation   Admission Date: 5/7/2022  Length of Stay: 0 days  Attending Physician: Reed Zuleta MD  Primary Care Provider: Gordy Cordero MD        Subjective:     Principal Problem:Left leg cellulitis        HPI:    Vicky Alvarado is a 57 y.o. female who has a past medical history of Anticoagulant long-term use, Arthritis, Atrial fibrillation, vascular necrosis, CHF, Depression, GERD, psychiatric care, Hypertension, Iron deficiency anemia, Obese, now Pre-diabetes, Psychiatric problem, and Sleep apnea, presented to the ED with Erythema and Pain to her left leg. Which started about 2 days ago. Patient had 101F yesterday. She attempted urgent care but they were unable to see her. On presentation today, her leg is erythematous and tender to touch. She denies any injury. 3/4 SIRS with 101F, , RR 22, with normal WBC#. She has lactic acidosis of 3.8 and . CT showed knee joint effusion with some peripheral enhancement which could represent sterile effusion and nonspecific reactive synovitis; however, septic arthritis not excluded, additionally, there is a prepatellar small rim enhancing collection favored to represent nonspecific bursitis, although small abscess not excluded in the setting of surrounding cellulitis of LLE.     Incidental/new findings: 3.8 cm incidental left adnexal cyst, suspected leiomyomatous uterus, iron deficient.      Overview/Hospital Course:  Admitted with Cellulitis and LA of 3.8, , . Erythema, Swelling and tenderness reducing, except her knee which she states hurts worse today. Continuing IV abx, concern about going through cellulitis to bursa and may seed it (if not primary cause), will discuss with silvio WONG. Denies SOB or CP. Eating okay. UOP wnl. BM wnl.   Overall pain and swelling improving, but  complains of worse knee pain.     Review of Systems   Constitutional:  Positive for activity change and fever.   HENT:  Negative for sore throat and trouble swallowing.    Eyes:  Negative for photophobia and visual disturbance.   Respiratory:  Negative for chest tightness and shortness of breath.    Cardiovascular:  Negative for chest pain, palpitations and leg swelling.   Gastrointestinal:  Negative for abdominal distention, abdominal pain, blood in stool, constipation, diarrhea, nausea and vomiting.   Endocrine: Negative for polyphagia and polyuria.   Genitourinary:  Negative for difficulty urinating, dysuria and hematuria.   Musculoskeletal:  Positive for joint swelling.   Skin:  Positive for color change.   Allergic/Immunologic: Negative for immunocompromised state.   Neurological:  Negative for dizziness, seizures, syncope and facial asymmetry.   Psychiatric/Behavioral:  Negative for agitation, behavioral problems and confusion.    Objective:     Vital Signs (Most Recent):  Temp: 98 °F (36.7 °C) (05/08/22 1606)  Pulse: 84 (05/08/22 1606)  Resp: 16 (05/08/22 1606)  BP: (!) 110/55 (05/08/22 1606)  SpO2: 96 % (05/08/22 1606)   Vital Signs (24h Range):  Temp:  [97.6 °F (36.4 °C)-98.4 °F (36.9 °C)] 98 °F (36.7 °C)  Pulse:  [] 84  Resp:  [16-25] 16  SpO2:  [92 %-98 %] 96 %  BP: (110-133)/(55-76) 110/55     Weight: 125.7 kg (277 lb 1.9 oz)  Body mass index is 44.73 kg/m².    Physical Exam  Vitals and nursing note reviewed.   Constitutional:       Appearance: She is well-developed. She is obese. She is ill-appearing. She is not toxic-appearing or diaphoretic.   HENT:      Head: Normocephalic and atraumatic.      Nose: Nose normal. No congestion.   Eyes:      General: No scleral icterus.     Pupils: Pupils are equal, round, and reactive to light.   Neck:      Thyroid: No thyromegaly.   Cardiovascular:      Rate and Rhythm: Normal rate and regular rhythm.      Heart sounds: No murmur heard.    No gallop.    Pulmonary:      Effort: Pulmonary effort is normal.      Breath sounds: Normal breath sounds. No stridor. No wheezing or rales.   Abdominal:      General: There is no distension.      Palpations: Abdomen is soft. There is no mass.      Tenderness: There is no abdominal tenderness. There is no guarding.   Musculoskeletal:         General: Swelling and tenderness present. No signs of injury. Normal range of motion.      Cervical back: Normal range of motion and neck supple. No rigidity.      Right lower leg: No edema.      Left lower leg: Edema present.   Lymphadenopathy:      Cervical: No cervical adenopathy.   Skin:     General: Skin is warm and dry.      Capillary Refill: Capillary refill takes less than 2 seconds.      Findings: Erythema present.   Neurological:      General: No focal deficit present.      Mental Status: She is alert and oriented to person, place, and time.      Cranial Nerves: No cranial nerve deficit.      Motor: No weakness.   Psychiatric:         Mood and Affect: Mood normal.         Behavior: Behavior normal.         CRANIAL NERVES     CN III, IV, VI   Pupils are equal, round, and reactive to light.         Recent Results (from the past 24 hour(s))   ISTAT PROCEDURE    Collection Time: 05/07/22  8:36 PM   Result Value Ref Range    POC PH 7.409 7.35 - 7.45    POC PCO2 42.1 35 - 45 mmHg    POC PO2 55 40 - 60 mmHg    POC HCO3 26.6 24 - 28 mmol/L    POC BE 2 -2 to 2 mmol/L    POC SATURATED O2 88 (L) 95 - 100 %    POC TCO2 28 24 - 29 mmol/L    Sample VENOUS     Site Other     Allens Test N/A     DelSys Room Air     Mode SPONT    Lactic Acid, Plasma    Collection Time: 05/07/22  8:46 PM   Result Value Ref Range    Lactate (Lactic Acid) 2.6 (H) 0.5 - 2.2 mmol/L   Lactic Acid, Plasma    Collection Time: 05/08/22 12:14 AM   Result Value Ref Range    Lactate (Lactic Acid) 1.4 0.5 - 2.2 mmol/L   CBC Auto Differential    Collection Time: 05/08/22  4:30 AM   Result Value Ref Range    WBC 5.90 3.90 -  12.70 K/uL    RBC 2.91 (L) 4.00 - 5.40 M/uL    Hemoglobin 8.1 (L) 12.0 - 16.0 g/dL    Hematocrit 26.6 (L) 37.0 - 48.5 %    MCV 91 82 - 98 fL    MCH 27.8 27.0 - 31.0 pg    MCHC 30.5 (L) 32.0 - 36.0 g/dL    RDW 17.0 (H) 11.5 - 14.5 %    Platelets 140 (L) 150 - 450 K/uL    MPV 12.3 9.2 - 12.9 fL    Immature Granulocytes 0.3 0.0 - 0.5 %    Gran # (ANC) 3.9 1.8 - 7.7 K/uL    Immature Grans (Abs) 0.02 0.00 - 0.04 K/uL    Lymph # 1.1 1.0 - 4.8 K/uL    Mono # 0.6 0.3 - 1.0 K/uL    Eos # 0.3 0.0 - 0.5 K/uL    Baso # 0.05 0.00 - 0.20 K/uL    nRBC 0 0 /100 WBC    Gran % 66.2 38.0 - 73.0 %    Lymph % 17.8 (L) 18.0 - 48.0 %    Mono % 9.8 4.0 - 15.0 %    Eosinophil % 5.1 0.0 - 8.0 %    Basophil % 0.8 0.0 - 1.9 %    Differential Method Automated    Comprehensive Metabolic Panel    Collection Time: 05/08/22  4:30 AM   Result Value Ref Range    Sodium 130 (L) 136 - 145 mmol/L    Potassium 3.8 3.5 - 5.1 mmol/L    Chloride 98 95 - 110 mmol/L    CO2 23 23 - 29 mmol/L    Glucose 156 (H) 70 - 110 mg/dL    BUN 10 6 - 20 mg/dL    Creatinine 0.9 0.5 - 1.4 mg/dL    Calcium 8.4 (L) 8.7 - 10.5 mg/dL    Total Protein 5.8 (L) 6.0 - 8.4 g/dL    Albumin 2.6 (L) 3.5 - 5.2 g/dL    Total Bilirubin 0.8 0.1 - 1.0 mg/dL    Alkaline Phosphatase 84 55 - 135 U/L    AST 24 10 - 40 U/L    ALT 13 10 - 44 U/L    Anion Gap 9 8 - 16 mmol/L    eGFR if African American >60.0 >60 mL/min/1.73 m^2    eGFR if non African American >60.0 >60 mL/min/1.73 m^2   Magnesium    Collection Time: 05/08/22  4:30 AM   Result Value Ref Range    Magnesium 1.3 (L) 1.6 - 2.6 mg/dL   Phosphorus    Collection Time: 05/08/22  4:30 AM   Result Value Ref Range    Phosphorus 2.6 (L) 2.7 - 4.5 mg/dL       Microbiology Results (last 7 days)       Procedure Component Value Units Date/Time    Blood Culture #1 **CANNOT BE ORDERED STAT** [[id number]] Collected: 05/07/22 1246    Order Status: Completed Specimen: Blood from Peripheral, Antecubital, Left Updated: 05/08/22 1412     Blood Culture,  Routine No Growth to date      No Growth to date    Blood Culture #2 **CANNOT BE ORDERED STAT** [[id number]] Collected: 05/07/22 1258    Order Status: Completed Specimen: Blood from Peripheral, Antecubital, Right Updated: 05/08/22 1412     Blood Culture, Routine No Growth to date      No Growth to date             Imaging Results              X-Ray Knee 3 View Left (Final result)  Result time 05/08/22 01:13:57      Final result by Timur Sanabria MD (05/08/22 01:13:57)                   Impression:      As above.      Electronically signed by: Timur Sanabria MD  Date:    05/08/2022  Time:    01:13               Narrative:    EXAMINATION:  XR KNEE 3 VIEW LEFT    CLINICAL HISTORY:  postop;    TECHNIQUE:  AP, lateral, and Merchant views of the left knee were performed.    COMPARISON:  05/07/2022 left tibia and fibula.    FINDINGS:  Suboptimal positioning on the AP view with the knee appears to be in flexion.  Joint space is not adequately assessed.    No displaced fracture or dislocation.  Suboptimal positioning on AP view results in limited assessment of the tibiofemoral joint space and tibial plateau.  Suprapatellar effusion present.  Diffuse soft tissue edema present.                                        CT Thigh With Contrast Left (Final result)  Result time 05/07/22 19:04:28      Final result by Dre Real MD (05/07/22 19:04:28)                   Impression:      Findings concerning for left lower extremity cellulitis.    Knee joint effusion with some peripheral enhancement which could represent sterile effusion and nonspecific reactive synovitis; however, septic arthritis not excluded.  Additionally, there is a prepatellar small rim enhancing collection favored to represent nonspecific bursitis, although small abscess not excluded in the setting of surrounding cellulitis.  Clinical correlation advised.  Fluid sampling may be warranted.    Mild degenerative changes of the lower extremity as detailed  above.    3.8 cm incidental left adnexal cyst.  For a patient of the this age group pelvic ultrasound follow-up in 6-12 months is recommended per ACR guidelines, or sooner if associated pain develops.    Suspected leiomyomatous uterus.  Further evaluation with elective/nonemergent pelvic ultrasound can be obtained as warranted.    Additional findings above.    This report was flagged in Epic as abnormal.    Electronically signed by resident: Ramon Hammonds  Date:    05/07/2022  Time:    18:26    Electronically signed by: Dre Real MD  Date:    05/07/2022  Time:    19:04               Narrative:    EXAMINATION:  CT THIGH WITH CONTRAST LEFT; CT LEG (TIBIA-FIBULA) WITH CONTRAST LEFT    CLINICAL HISTORY:  Soft tissue infection suspected, thigh, xray done;; Soft tissue infection suspected, lower leg, xray done;    TECHNIQUE:  Axial images performed through the iliac crests to the foot of the left lower extremity after administration of 100 mL of intravenous Omnipaque 350.  Coronal and sagittal reformations performed.    COMPARISON:  Lower extremity veins ultrasound 05/07/2022    FINDINGS:  The bones of the left lower extremity demonstrate mild degenerative changes of the left knee indicated by subchondral sclerosis, osteophytic formation, and mild joint space narrowing.  Degenerative changes of the 1st metatarsophalangeal joint is noted as well.  No acute fracture.  No osseous destructive lesions.  Small suprapatellar and femorotibial joint effusions with some peripheral enhancement.    The soft tissues of the left lower extremity demonstrate multiple areas of soft tissue attenuation with peripheral calcification in the subcutaneous soft tissues overlying the lateral left thigh, perhaps relating to remote trauma or injection medication use.  Left inguinal skin thickening, likely postsurgical related to prior left superficial femoral artery branch laceration repair.  There is left inguinal lymphadenopathy, for  example lymph node measuring 1.2 cm in short axis (series 301, image 845). Septation of the subcutaneous fat involving the left lower extremity mainly in the lower leg, indicating edema.  Mild skin thickening overlying the left lateral lower leg.  There is approximate 2.8 x 1.2 x 2.8 cm slightly heterogeneous collection within enhancing rim along the anterior mid to lower aspect of the patella.    Limited intrapelvic evaluation demonstrates enlargement of the uterus with punctate internal calcifications, likely leiomyomatous uterus.  3.8 cm left adnexal hypodensity most consistent with a cyst.                                        CT Leg (Tibia-Fibula) Wtih Contrast Left (Final result)  Result time 05/07/22 19:04:28      Final result by Dre Real MD (05/07/22 19:04:28)                   Impression:      Findings concerning for left lower extremity cellulitis.    Knee joint effusion with some peripheral enhancement which could represent sterile effusion and nonspecific reactive synovitis; however, septic arthritis not excluded.  Additionally, there is a prepatellar small rim enhancing collection favored to represent nonspecific bursitis, although small abscess not excluded in the setting of surrounding cellulitis.  Clinical correlation advised.  Fluid sampling may be warranted.    Mild degenerative changes of the lower extremity as detailed above.    3.8 cm incidental left adnexal cyst.  For a patient of the this age group pelvic ultrasound follow-up in 6-12 months is recommended per ACR guidelines, or sooner if associated pain develops.    Suspected leiomyomatous uterus.  Further evaluation with elective/nonemergent pelvic ultrasound can be obtained as warranted.    Additional findings above.    This report was flagged in Epic as abnormal.    Electronically signed by resident: Ramon Hammonds  Date:    05/07/2022  Time:    18:26    Electronically signed by: Dre Real  MD  Date:    05/07/2022  Time:    19:04               Narrative:    EXAMINATION:  CT THIGH WITH CONTRAST LEFT; CT LEG (TIBIA-FIBULA) WITH CONTRAST LEFT    CLINICAL HISTORY:  Soft tissue infection suspected, thigh, xray done;; Soft tissue infection suspected, lower leg, xray done;    TECHNIQUE:  Axial images performed through the iliac crests to the foot of the left lower extremity after administration of 100 mL of intravenous Omnipaque 350.  Coronal and sagittal reformations performed.    COMPARISON:  Lower extremity veins ultrasound 05/07/2022    FINDINGS:  The bones of the left lower extremity demonstrate mild degenerative changes of the left knee indicated by subchondral sclerosis, osteophytic formation, and mild joint space narrowing.  Degenerative changes of the 1st metatarsophalangeal joint is noted as well.  No acute fracture.  No osseous destructive lesions.  Small suprapatellar and femorotibial joint effusions with some peripheral enhancement.    The soft tissues of the left lower extremity demonstrate multiple areas of soft tissue attenuation with peripheral calcification in the subcutaneous soft tissues overlying the lateral left thigh, perhaps relating to remote trauma or injection medication use.  Left inguinal skin thickening, likely postsurgical related to prior left superficial femoral artery branch laceration repair.  There is left inguinal lymphadenopathy, for example lymph node measuring 1.2 cm in short axis (series 301, image 845). Septation of the subcutaneous fat involving the left lower extremity mainly in the lower leg, indicating edema.  Mild skin thickening overlying the left lateral lower leg.  There is approximate 2.8 x 1.2 x 2.8 cm slightly heterogeneous collection within enhancing rim along the anterior mid to lower aspect of the patella.    Limited intrapelvic evaluation demonstrates enlargement of the uterus with punctate internal calcifications, likely leiomyomatous uterus.  3.8 cm  left adnexal hypodensity most consistent with a cyst.                                       US Lower Extremity Veins Left (Final result)  Result time 05/07/22 14:23:00      Final result by Dre Real MD (05/07/22 14:23:00)                   Impression:      No evidence of deep venous thrombosis in the left lower extremity.      Electronically signed by: Dre Real MD  Date:    05/07/2022  Time:    14:23               Narrative:    EXAMINATION:  US LOWER EXTREMITY VEINS LEFT    CLINICAL HISTORY:  Other specified soft tissue disorders    TECHNIQUE:  Duplex and color flow Doppler evaluation and graded compression of the left lower extremity veins was performed.    COMPARISON:  None    FINDINGS:  Left thigh veins: The common femoral, femoral, popliteal, upper greater saphenous, and deep femoral veins are patent and free of thrombus. The veins are normally compressible and have normal phasic flow and augmentation response.    Left calf veins: The visualized calf veins are patent.    Contralateral CFV: The contralateral (right) common femoral vein is patent and free of thrombus.    Miscellaneous: Nonspecific subcutaneous edema at the left calf region.                                       X-Ray Tibia Fibula 2 View Left (Final result)  Result time 05/07/22 13:57:38      Final result by Joshua Fritz MD (05/07/22 13:57:38)                   Impression:      As above.      Electronically signed by: Joshua Fritz  Date:    05/07/2022  Time:    13:57               Narrative:    EXAMINATION:  XR TIBIA FIBULA 2 VIEW LEFT    CLINICAL HISTORY:  Pain in leg, unspecified    TECHNIQUE:  AP and lateral views of the left tibia and fibula were performed.    COMPARISON:  None.    FINDINGS:  No acute fracture, dislocation or osseous destruction.  No radiopaque foreign body.  Large calcaneal enthesophyte at the distal Achilles insertion.  Partial visualized femorotibial degenerative change.                                             Assessment/Plan:      Cellulitis of extremity  3/4 SIRS with 101F, , RR 22, with normal WBC#.   She has lactic acidosis of 3.8 and .   CT showed knee joint effusion with some peripheral enhancement which could represent sterile effusion and nonspecific reactive synovitis; however, septic arthritis not excluded, additionally, there is a prepatellar small rim enhancing collection favored to represent nonspecific bursitis, although small abscess not excluded in the setting of surrounding cellulitis of LLE.   -Vanc and Zosyn  -blood cx's  -sepsis fluids, LA trend  -GenSx consulted to assess  -Will consult ortho to see if fluid collection (vs bursitis) should be tapped    Iron deficiency anemia  Give iron after infection settles      Recurrent major depressive disorder, in partial remission  Continue duloxetine      Gastroesophageal reflux disease without esophagitis  Continue PPI      Pure hypercholesterolemia  Cont statin      ERENDIRA (obstructive sleep apnea)  Offer CPAP nightly      Type 2 diabetes mellitus with diabetic polyneuropathy, without long-term current use of insulin  Well controlled. A1c 5.7  Consider metoprolol at discharge if not on  Statin  Consider bariatric surgery      Morbid obesity with BMI of 45.0-49.9, adult  Body mass index is 46.48 kg/m². Morbid obesity complicates all aspects of disease management from diagnostic modalities to treatment. Weight loss encouraged and health benefits explained to patient.   -Given DM and Cardiac disease, consider bariatric surgery        Atrial Fibrillation (paroxysmal)  - S/p 2 cardioversion.  She is now in sinus.  - continue propafenone  - resume metoprolol.  Increase to home dose as tolerated.  - space continue apixaban    Essential hypertension  Continue home meds:  Lisinopril metoprolol nifedipine      Opioid dependence in remission  Noted        VTE Risk Mitigation (From admission, onward)         Ordered     apixaban tablet 5 mg  2  times daily         05/07/22 2000     IP VTE HIGH RISK PATIENT  Once         05/07/22 1953     Place sequential compression device  Until discontinued         05/07/22 1953                Discharge Planning   RAYO:      Code Status: Full Code   Is the patient medically ready for discharge?:     Reason for patient still in hospital (select all that apply): Patient trending condition and Treatment                     Reed Zuleta MD  Department of Hospital Medicine   [facility] - Telemetry Stepdown ([place]

## 2022-05-09 ENCOUNTER — PATIENT MESSAGE (OUTPATIENT)
Dept: CARDIOLOGY | Facility: CLINIC | Age: 57
End: 2022-05-09
Payer: COMMERCIAL

## 2022-05-09 LAB
ALBUMIN SERPL BCP-MCNC: 2.9 G/DL (ref 3.5–5.2)
ALP SERPL-CCNC: 105 U/L (ref 55–135)
ALT SERPL W/O P-5'-P-CCNC: 21 U/L (ref 10–44)
ANION GAP SERPL CALC-SCNC: 11 MMOL/L (ref 8–16)
AST SERPL-CCNC: 50 U/L (ref 10–40)
BASOPHILS # BLD AUTO: 0.04 K/UL (ref 0–0.2)
BASOPHILS NFR BLD: 0.6 % (ref 0–1.9)
BILIRUB SERPL-MCNC: 0.9 MG/DL (ref 0.1–1)
BUN SERPL-MCNC: 11 MG/DL (ref 6–20)
CALCIUM SERPL-MCNC: 8.7 MG/DL (ref 8.7–10.5)
CHLORIDE SERPL-SCNC: 97 MMOL/L (ref 95–110)
CO2 SERPL-SCNC: 24 MMOL/L (ref 23–29)
CREAT SERPL-MCNC: 1 MG/DL (ref 0.5–1.4)
DIFFERENTIAL METHOD: ABNORMAL
EOSINOPHIL # BLD AUTO: 0.4 K/UL (ref 0–0.5)
EOSINOPHIL NFR BLD: 6 % (ref 0–8)
ERYTHROCYTE [DISTWIDTH] IN BLOOD BY AUTOMATED COUNT: 17.1 % (ref 11.5–14.5)
EST. GFR  (AFRICAN AMERICAN): >60 ML/MIN/1.73 M^2
EST. GFR  (NON AFRICAN AMERICAN): >60 ML/MIN/1.73 M^2
GLUCOSE SERPL-MCNC: 133 MG/DL (ref 70–110)
HCT VFR BLD AUTO: 29.5 % (ref 37–48.5)
HGB BLD-MCNC: 8.5 G/DL (ref 12–16)
IMM GRANULOCYTES # BLD AUTO: 0.02 K/UL (ref 0–0.04)
IMM GRANULOCYTES NFR BLD AUTO: 0.3 % (ref 0–0.5)
LYMPHOCYTES # BLD AUTO: 1.2 K/UL (ref 1–4.8)
LYMPHOCYTES NFR BLD: 18.8 % (ref 18–48)
MAGNESIUM SERPL-MCNC: 1.7 MG/DL (ref 1.6–2.6)
MCH RBC QN AUTO: 27.8 PG (ref 27–31)
MCHC RBC AUTO-ENTMCNC: 28.8 G/DL (ref 32–36)
MCV RBC AUTO: 96 FL (ref 82–98)
MONOCYTES # BLD AUTO: 0.6 K/UL (ref 0.3–1)
MONOCYTES NFR BLD: 9.1 % (ref 4–15)
NEUTROPHILS # BLD AUTO: 4 K/UL (ref 1.8–7.7)
NEUTROPHILS NFR BLD: 65.2 % (ref 38–73)
NRBC BLD-RTO: 0 /100 WBC
PLATELET # BLD AUTO: 123 K/UL (ref 150–450)
PMV BLD AUTO: 12.4 FL (ref 9.2–12.9)
POTASSIUM SERPL-SCNC: 3.7 MMOL/L (ref 3.5–5.1)
PROT SERPL-MCNC: 6.4 G/DL (ref 6–8.4)
RBC # BLD AUTO: 3.06 M/UL (ref 4–5.4)
SODIUM SERPL-SCNC: 132 MMOL/L (ref 136–145)
VANCOMYCIN TROUGH SERPL-MCNC: 22.5 UG/ML (ref 10–22)
WBC # BLD AUTO: 6.16 K/UL (ref 3.9–12.7)

## 2022-05-09 PROCEDURE — 25000003 PHARM REV CODE 250: Performed by: INTERNAL MEDICINE

## 2022-05-09 PROCEDURE — 99233 PR SUBSEQUENT HOSPITAL CARE,LEVL III: ICD-10-PCS | Mod: ,,, | Performed by: STUDENT IN AN ORGANIZED HEALTH CARE EDUCATION/TRAINING PROGRAM

## 2022-05-09 PROCEDURE — 63600175 PHARM REV CODE 636 W HCPCS: Performed by: STUDENT IN AN ORGANIZED HEALTH CARE EDUCATION/TRAINING PROGRAM

## 2022-05-09 PROCEDURE — 11000001 HC ACUTE MED/SURG PRIVATE ROOM

## 2022-05-09 PROCEDURE — 85025 COMPLETE CBC W/AUTO DIFF WBC: CPT | Performed by: STUDENT IN AN ORGANIZED HEALTH CARE EDUCATION/TRAINING PROGRAM

## 2022-05-09 PROCEDURE — 99233 SBSQ HOSP IP/OBS HIGH 50: CPT | Mod: ,,, | Performed by: STUDENT IN AN ORGANIZED HEALTH CARE EDUCATION/TRAINING PROGRAM

## 2022-05-09 PROCEDURE — 36415 COLL VENOUS BLD VENIPUNCTURE: CPT | Performed by: STUDENT IN AN ORGANIZED HEALTH CARE EDUCATION/TRAINING PROGRAM

## 2022-05-09 PROCEDURE — 80053 COMPREHEN METABOLIC PANEL: CPT | Performed by: STUDENT IN AN ORGANIZED HEALTH CARE EDUCATION/TRAINING PROGRAM

## 2022-05-09 PROCEDURE — 80202 ASSAY OF VANCOMYCIN: CPT | Performed by: STUDENT IN AN ORGANIZED HEALTH CARE EDUCATION/TRAINING PROGRAM

## 2022-05-09 PROCEDURE — 94761 N-INVAS EAR/PLS OXIMETRY MLT: CPT

## 2022-05-09 PROCEDURE — 83735 ASSAY OF MAGNESIUM: CPT | Performed by: STUDENT IN AN ORGANIZED HEALTH CARE EDUCATION/TRAINING PROGRAM

## 2022-05-09 PROCEDURE — 25000003 PHARM REV CODE 250: Performed by: STUDENT IN AN ORGANIZED HEALTH CARE EDUCATION/TRAINING PROGRAM

## 2022-05-09 RX ORDER — NIFEDIPINE 30 MG/1
30 TABLET, EXTENDED RELEASE ORAL DAILY
Status: DISCONTINUED | OUTPATIENT
Start: 2022-05-10 | End: 2022-05-14 | Stop reason: HOSPADM

## 2022-05-09 RX ADMIN — CELECOXIB 100 MG: 100 CAPSULE ORAL at 09:05

## 2022-05-09 RX ADMIN — VANCOMYCIN HYDROCHLORIDE 1750 MG: 10 INJECTION, POWDER, LYOPHILIZED, FOR SOLUTION INTRAVENOUS at 03:05

## 2022-05-09 RX ADMIN — HYDROMORPHONE HYDROCHLORIDE 0.5 MG: 1 INJECTION, SOLUTION INTRAMUSCULAR; INTRAVENOUS; SUBCUTANEOUS at 08:05

## 2022-05-09 RX ADMIN — MELATONIN TAB 3 MG 6 MG: 3 TAB at 09:05

## 2022-05-09 RX ADMIN — MEDROXYPROGESTERONE ACETATE 20 MG: 10 TABLET ORAL at 08:05

## 2022-05-09 RX ADMIN — MICONAZOLE NITRATE 2 % TOPICAL POWDER: at 09:05

## 2022-05-09 RX ADMIN — MEDROXYPROGESTERONE ACETATE 20 MG: 10 TABLET ORAL at 10:05

## 2022-05-09 RX ADMIN — PIPERACILLIN SODIUM AND TAZOBACTAM SODIUM 4.5 G: 4; .5 INJECTION, POWDER, LYOPHILIZED, FOR SOLUTION INTRAVENOUS at 09:05

## 2022-05-09 RX ADMIN — FUROSEMIDE 40 MG: 10 INJECTION, SOLUTION INTRAMUSCULAR; INTRAVENOUS at 09:05

## 2022-05-09 RX ADMIN — ONDANSETRON 8 MG: 8 TABLET, ORALLY DISINTEGRATING ORAL at 08:05

## 2022-05-09 RX ADMIN — PIPERACILLIN SODIUM AND TAZOBACTAM SODIUM 4.5 G: 4; .5 INJECTION, POWDER, LYOPHILIZED, FOR SOLUTION INTRAVENOUS at 05:05

## 2022-05-09 RX ADMIN — TRAZODONE HYDROCHLORIDE 50 MG: 50 TABLET ORAL at 09:05

## 2022-05-09 RX ADMIN — APIXABAN 5 MG: 5 TABLET, FILM COATED ORAL at 09:05

## 2022-05-09 RX ADMIN — OXYCODONE 5 MG: 5 TABLET ORAL at 09:05

## 2022-05-09 RX ADMIN — HYDROMORPHONE HYDROCHLORIDE 0.5 MG: 1 INJECTION, SOLUTION INTRAMUSCULAR; INTRAVENOUS; SUBCUTANEOUS at 02:05

## 2022-05-09 RX ADMIN — MEDROXYPROGESTERONE ACETATE 20 MG: 10 TABLET ORAL at 04:05

## 2022-05-09 RX ADMIN — PIPERACILLIN SODIUM AND TAZOBACTAM SODIUM 4.5 G: 4; .5 INJECTION, POWDER, LYOPHILIZED, FOR SOLUTION INTRAVENOUS at 02:05

## 2022-05-09 RX ADMIN — ONDANSETRON 8 MG: 8 TABLET, ORALLY DISINTEGRATING ORAL at 02:05

## 2022-05-09 RX ADMIN — MICONAZOLE NITRATE 2 % TOPICAL POWDER: at 08:05

## 2022-05-09 RX ADMIN — PROPAFENONE HYDROCHLORIDE 225 MG: 150 TABLET, FILM COATED ORAL at 10:05

## 2022-05-09 RX ADMIN — DULOXETINE 120 MG: 60 CAPSULE, DELAYED RELEASE ORAL at 08:05

## 2022-05-09 RX ADMIN — THERA TABS 1 TABLET: TAB at 08:05

## 2022-05-09 RX ADMIN — CELECOXIB 100 MG: 100 CAPSULE ORAL at 08:05

## 2022-05-09 RX ADMIN — ALPRAZOLAM 0.5 MG: 0.25 TABLET ORAL at 06:05

## 2022-05-09 RX ADMIN — LISINOPRIL 20 MG: 20 TABLET ORAL at 09:05

## 2022-05-09 RX ADMIN — APIXABAN 5 MG: 5 TABLET, FILM COATED ORAL at 08:05

## 2022-05-09 RX ADMIN — PROPAFENONE HYDROCHLORIDE 225 MG: 150 TABLET, FILM COATED ORAL at 05:05

## 2022-05-09 RX ADMIN — METOPROLOL SUCCINATE 50 MG: 50 TABLET, EXTENDED RELEASE ORAL at 09:05

## 2022-05-09 RX ADMIN — PANTOPRAZOLE SODIUM 40 MG: 40 TABLET, DELAYED RELEASE ORAL at 08:05

## 2022-05-09 RX ADMIN — ALPRAZOLAM 0.5 MG: 0.25 TABLET ORAL at 05:05

## 2022-05-09 RX ADMIN — PROPAFENONE HYDROCHLORIDE 225 MG: 150 TABLET, FILM COATED ORAL at 01:05

## 2022-05-09 RX ADMIN — ATORVASTATIN CALCIUM 40 MG: 40 TABLET, FILM COATED ORAL at 08:05

## 2022-05-09 NOTE — PROGRESS NOTES
[facility] - Telemetry Stepdown ([place] [address])  Orthopedics  Progress Note    Patient Name: Vicky Alvarado  MRN: [id number]  Admission Date: 5/7/2022  Hospital Length of Stay: 0 days  Attending Provider: Reed Zuleta MD  Primary Care Provider: Gordy Cordero MD    Subjective:     Principal Problem:Left leg cellulitis    Principal Orthopedic Problem:  Prepatellar septic bursitis.    Interval History: Patient seen and examined at bedside.  States that the leg is approximately 75% better in regards to the erythema.  She has minimal pain with range of motion of the knee.  She is able to bear weight easily on it.  Still reports some redness around the knee particularly around the prepatellar bursa.      Review of patient's allergies indicates:   Allergen Reactions    Flecainide Shortness Of Breath and Swelling       Current Facility-Administered Medications   Medication    acetaminophen tablet 650 mg    ALPRAZolam tablet 0.5 mg    apixaban tablet 5 mg    atorvastatin tablet 40 mg    celecoxib capsule 100 mg    diphenhydrAMINE capsule 25 mg    DULoxetine DR capsule 120 mg    furosemide injection 40 mg    glucagon (human recombinant) injection 1 mg    glucose chewable tablet 16 g    glucose chewable tablet 24 g    HYDROmorphone injection 0.5 mg    lisinopriL tablet 20 mg    medroxyPROGESTERone tablet 20 mg    melatonin tablet 6 mg    metoprolol succinate (TOPROL-XL) 24 hr tablet 50 mg    miconazole NITRATE 2 % top powder    multivitamin tablet    naloxone 0.4 mg/mL injection 0.02 mg    NIFEdipine 24 hr tablet 90 mg    ondansetron disintegrating tablet 8 mg    oxyCODONE immediate release tablet 5 mg    pantoprazole EC tablet 40 mg    piperacillin-tazobactam 4.5 g in sodium chloride 0.9% 100 mL IVPB (ready to mix system)    polyethylene glycol packet 17 g    propafenone tablet 225 mg    traZODone tablet 50 mg    vancomycin - pharmacy to dose    vancomycin 1.75 g in 5 % dextrose 500 mL IVPB  "    Facility-Administered Medications Ordered in Other Encounters   Medication    sodium chloride 0.9% bolus 1,000 mL     Objective:     Vital Signs (Most Recent):  Temp: 98.3 °F (36.8 °C) (05/09/22 0012)  Pulse: 91 (05/09/22 0425)  Resp: 18 (05/09/22 0423)  BP: (!) 115/58 (05/09/22 0423)  SpO2: 96 % (05/09/22 0423)   Vital Signs (24h Range):  Temp:  [97.6 °F (36.4 °C)-98.3 °F (36.8 °C)] 98.3 °F (36.8 °C)  Pulse:  [82-91] 91  Resp:  [15-18] 18  SpO2:  [95 %-98 %] 96 %  BP: ()/(46-71) 115/58     Weight: 125.7 kg (277 lb 1.9 oz)  Height: 5' 6" (167.6 cm)  Body mass index is 44.73 kg/m².    No intake or output data in the 24 hours ending 05/09/22 0546    Ortho/SPM Exam  Physical Exam:  NAD, A/O x 3.  She large amount of erythema around the knee that has visibly resolved.  She does have a small amount of fluid noted anterior aspect of the knee into some surrounding erythema.  Area proximally 3 x 3 cm.  Full passive, active range of motion of the knee without pain.  Is able to bear weight.      Significant Labs: All pertinent labs within the past 24 hours have been reviewed.    Significant Imaging: I have reviewed and interpreted all pertinent imaging results/findings.    Assessment/Plan:     * Left leg cellulitis  57F with left knee and leg cellulitis, and likely left prepatellar bursitis. Tmax 101 at home, , WBC 8 (wnl),  (elevated),  (elevated),  lactate 3.9 (elevated).  No concern for septic arthritis at this time.  Will continue IV antibiotic management.  Will re-evaluate tomorrow.    Recs  -No acute orthopedic surgical intervention at this time  -IV abx per hospital medicine  -WBAT LLE  -If left knee exam worsens please contact ortho to evaluate   Dispo:  Will re-evaluate the knee in the morning.  If no significant clinical improvement of the knee will consider incision and drainage at that time.  Hopefully this will resolve with continued empiric IV antibiotics.          Gabriel MIJARES" MD [facility]  [facility] - Tele[facility] Stepdown ([place])

## 2022-05-09 NOTE — ASSESSMENT & PLAN NOTE
57F with left knee and leg cellulitis, and likely left prepatellar bursitis. Tmax 101 at home, , WBC 8 (wnl),  (elevated),  (elevated),  lactate 3.9 (elevated).  No concern for septic arthritis at this time.  Will continue IV antibiotic management.  Will re-evaluate tomorrow.    Recs  -No acute orthopedic surgical intervention at this time  -IV abx per hospital medicine  -WBAT LLE  -If left knee exam worsens please contact ortho to evaluate   Dispo:  Will re-evaluate the knee in the morning.  If no significant clinical improvement of the knee will consider incision and drainage at that time.  Hopefully this will resolve with continued empiric IV antibiotics.

## 2022-05-09 NOTE — PROGRESS NOTES
Pharmacokinetic Assessment Follow Up: IV Vancomycin    Vancomycin serum concentration assessment(s):    The trough level was drawn incorrectly and cannot be used to guide therapy at this time.    Vancomycin Regimen Plan:    Continue regimen to Vancomycin 1750 mg IV every 12 hours with next serum trough concentration measured at 1400 prior to 4th dose on 5/10    Drug levels (last 3 results):  Recent Labs   Lab Result Units 05/09/22  0824   Vancomycin-Trough ug/mL 22.5*       Pharmacy will continue to follow and monitor vancomycin.    Please contact pharmacy at extension [phone] for questions regarding this assessment.    Thank you for the consult,   Apryl Fitzpatrick       Patient brief summary:  Vicky Alvarado is a 57 y.o. female initiated on antimicrobial therapy with IV Vancomycin for treatment of skin & soft tissue infection    The patient's current regimen is Vancomycin 1750 mg q12h    Drug Allergies:   Review of patient's allergies indicates:   Allergen Reactions    Flecainide Shortness Of Breath and Swelling       Actual Body Weight:   125.7 kg    Renal Function:   Estimated Creatinine Clearance: 83.2 mL/min (based on SCr of 1 mg/dL).,     Dialysis Method (if applicable):  N/A    CBC (last 72 hours):  Recent Labs   Lab Result Units 05/07/22  1247 05/07/22  1543 05/08/22  0430 05/09/22  0651   WBC K/uL 8.38  --  5.90 6.16   Hemoglobin g/dL 10.2*  --  8.1* 8.5*   Hemoglobin A1C %  --  5.7*  --   --    Hematocrit % 31.9*  --  26.6* 29.5*   Platelets K/uL 161  --  140* 123*   Gran % % 72.5  --  66.2 65.2   Lymph % % 17.3*  --  17.8* 18.8   Mono % % 7.9  --  9.8 9.1   Eosinophil % % 1.3  --  5.1 6.0   Basophil % % 0.6  --  0.8 0.6   Differential Method  Automated  --  Automated Automated       Metabolic Panel (last 72 hours):  Recent Labs   Lab Result Units 05/07/22  1247 05/08/22  0430 05/09/22  0651   Sodium mmol/L 135* 130* 132*   Potassium mmol/L 5.2* 3.8 3.7   Chloride mmol/L 101 98 97   CO2 mmol/L 19*  23 24   Glucose mg/dL 141* 156* 133*   BUN mg/dL 9 10 11   Creatinine mg/dL 0.9 0.9 1.0   Albumin g/dL 3.3* 2.6* 2.9*   Total Bilirubin mg/dL 0.8 0.8 0.9   Alkaline Phosphatase U/L 98 84 105   AST U/L 47* 24 50*   ALT U/L 17 13 21   Magnesium mg/dL  --  1.3* 1.7   Phosphorus mg/dL  --  2.6*  --        Vancomycin Administrations:  vancomycin given in the last 96 hours                   vancomycin 1.75 g in 5 % dextrose 500 mL IVPB (mg) 1,750 mg New Bag 05/09/22 0305    vancomycin 1.75 g in 5 % dextrose 500 mL IVPB (mg) 1,750 mg New Bag 05/08/22 0239    vancomycin 2 g in dextrose 5 % 500 mL IVPB (mg) 2,000 mg New Bag 05/07/22 1502                Microbiologic Results:  Microbiology Results (last 7 days)     Procedure Component Value Units Date/Time    Blood Culture #1 **CANNOT BE ORDERED STAT** [[id number]] Collected: 05/07/22 1246    Order Status: Completed Specimen: Blood from Peripheral, Antecubital, Left Updated: 05/08/22 1412     Blood Culture, Routine No Growth to date      No Growth to date    Blood Culture #2 **CANNOT BE ORDERED STAT** [[id number]] Collected: 05/07/22 1258    Order Status: Completed Specimen: Blood from Peripheral, Antecubital, Right Updated: 05/08/22 1412     Blood Culture, Routine No Growth to date      No Growth to date

## 2022-05-09 NOTE — PLAN OF CARE
Patient alert x 4 ,  Pain managed with positioning and medicine. Patient IV   Antibiotics tolerated well. Slept intermittently.    Problem: Adult Inpatient Plan of Care  Goal: Patient-Specific Goal (Individualized)  Outcome: Ongoing, Progressing     Problem: Adult Inpatient Plan of Care  Goal: Absence of Hospital-Acquired Illness or Injury  Outcome: Ongoing, Progressing

## 2022-05-09 NOTE — PLAN OF CARE
Patient aaox4 and sitting in chair. Patient's son present in room. Patient denies complaints at this time. No acute resp distress noted. Wound to left side of patient's head not draining or bleeding at this time. Patient denies needs at this time. Chair locked. Call light within reach. Patient instructed to call with needs.       Problem: Adult Inpatient Plan of Care  Goal: Plan of Care Review  Outcome: Ongoing, Progressing  Goal: Patient-Specific Goal (Individualized)  Outcome: Ongoing, Progressing  Goal: Absence of Hospital-Acquired Illness or Injury  Outcome: Ongoing, Progressing  Goal: Optimal Comfort and Wellbeing  Outcome: Ongoing, Progressing  Goal: Readiness for Transition of Care  Outcome: Ongoing, Progressing     Problem: Bariatric Environmental Safety  Goal: Safety Maintained with Care  Outcome: Ongoing, Progressing     Problem: Diabetes Comorbidity  Goal: Blood Glucose Level Within Targeted Range  Outcome: Ongoing, Progressing     Problem: Fall Injury Risk  Goal: Absence of Fall and Fall-Related Injury  Outcome: Ongoing, Progressing

## 2022-05-09 NOTE — PROGRESS NOTES
[facility] - Telemetry Stepdown ([address] Medicine  Progress Note    Patient Name: Vicky Alvarado  MRN: [id number]  Patient Class: IP- Inpatient   Admission Date: 5/7/2022  Length of Stay: 0 days  Attending Physician: Reed Zuleta MD  Primary Care Provider: Gordy Cordero MD        Subjective:     Principal Problem:Left leg cellulitis        HPI:    Vicky Alvarado is a 57 y.o. female who has a past medical history of Anticoagulant long-term use, Arthritis, Atrial fibrillation, vascular necrosis, CHF, Depression, GERD, psychiatric care, Hypertension, Iron deficiency anemia, Obese, now Pre-diabetes, Psychiatric problem, and Sleep apnea, presented to the ED with Erythema and Pain to her left leg. Which started about 2 days ago. Patient had 101F yesterday. She attempted urgent care but they were unable to see her. On presentation today, her leg is erythematous and tender to touch. She denies any injury. 3/4 SIRS with 101F, , RR 22, with normal WBC#. She has lactic acidosis of 3.8 and . CT showed knee joint effusion with some peripheral enhancement which could represent sterile effusion and nonspecific reactive synovitis; however, septic arthritis not excluded, additionally, there is a prepatellar small rim enhancing collection favored to represent nonspecific bursitis, although small abscess not excluded in the setting of surrounding cellulitis of LLE.     Incidental/new findings: 3.8 cm incidental left adnexal cyst, suspected leiomyomatous uterus, iron deficient.      Overview/Hospital Course:  Admitted with Cellulitis and LA of 3.8, , . Erythema, Swelling and tenderness reducing, except her knee which she states hurts worse today. Continuing IV abx, concern about going through cellulitis to bursa and may seed it (if not primary cause), will discuss with ortho. Erythema resolving. Still with pain and a bit of redness around knee, ortho will check tomorrow to make  sure still improving. Still +2 PE on RLE and +1 PE on LLE, will continue lasix, reduce BP meds for now.       NAEON. Denies SOB or CP. Eating okay. UOP wnl. BM wnl.   Overall pain and swelling improving, knee still with pain but less so.      Review of Systems   Constitutional:  Positive for activity change and fever.   HENT:  Negative for sore throat and trouble swallowing.    Eyes:  Negative for photophobia and visual disturbance.   Respiratory:  Negative for chest tightness and shortness of breath.    Cardiovascular:  Negative for chest pain, palpitations and leg swelling.   Gastrointestinal:  Negative for abdominal distention, abdominal pain, blood in stool, constipation, diarrhea, nausea and vomiting.   Endocrine: Negative for polyphagia and polyuria.   Genitourinary:  Negative for difficulty urinating, dysuria and hematuria.   Musculoskeletal:  Positive for joint swelling.   Skin:  Positive for color change.   Allergic/Immunologic: Negative for immunocompromised state.   Neurological:  Negative for dizziness, seizures, syncope and facial asymmetry.   Psychiatric/Behavioral:  Negative for agitation, behavioral problems and confusion.    Objective:     Vital Signs (Most Recent):  Temp: 97.9 °F (36.6 °C) (05/09/22 1621)  Pulse: 77 (05/09/22 1621)  Resp: 16 (05/09/22 1621)  BP: (!) 118/58 (05/09/22 1621)  SpO2: 100 % (05/09/22 1621)   Vital Signs (24h Range):  Temp:  [97.9 °F (36.6 °C)-98.4 °F (36.9 °C)] 97.9 °F (36.6 °C)  Pulse:  [69-91] 77  Resp:  [15-18] 16  SpO2:  [95 %-100 %] 100 %  BP: ()/(46-59) 118/58     Weight: 125.7 kg (277 lb 1.9 oz)  Body mass index is 44.73 kg/m².    Physical Exam  Vitals and nursing note reviewed.   Constitutional:       Appearance: She is well-developed. She is obese. She is ill-appearing. She is not toxic-appearing or diaphoretic.   HENT:      Head: Normocephalic and atraumatic.      Nose: Nose normal. No congestion.   Eyes:      General: No scleral icterus.     Pupils:  Pupils are equal, round, and reactive to light.   Neck:      Thyroid: No thyromegaly.   Cardiovascular:      Rate and Rhythm: Normal rate and regular rhythm.      Heart sounds: No murmur heard.    No gallop.   Pulmonary:      Effort: Pulmonary effort is normal.      Breath sounds: Normal breath sounds. No stridor. No wheezing or rales.   Abdominal:      General: There is no distension.      Palpations: Abdomen is soft. There is no mass.      Tenderness: There is no abdominal tenderness. There is no guarding.   Musculoskeletal:         General: Swelling and tenderness present. No signs of injury. Normal range of motion.      Cervical back: Normal range of motion and neck supple. No rigidity.      Right lower leg: No edema.      Left lower leg: Edema present.   Lymphadenopathy:      Cervical: No cervical adenopathy.   Skin:     General: Skin is warm and dry.      Capillary Refill: Capillary refill takes less than 2 seconds.      Findings: Erythema present.   Neurological:      General: No focal deficit present.      Mental Status: She is alert and oriented to person, place, and time.      Cranial Nerves: No cranial nerve deficit.      Motor: No weakness.   Psychiatric:         Mood and Affect: Mood normal.         Behavior: Behavior normal.         CRANIAL NERVES     CN III, IV, VI   Pupils are equal, round, and reactive to light.     +2 PE on RLE and +1 PE on LLE    Recent Results (from the past 24 hour(s))   CBC Auto Differential    Collection Time: 05/09/22  6:51 AM   Result Value Ref Range    WBC 6.16 3.90 - 12.70 K/uL    RBC 3.06 (L) 4.00 - 5.40 M/uL    Hemoglobin 8.5 (L) 12.0 - 16.0 g/dL    Hematocrit 29.5 (L) 37.0 - 48.5 %    MCV 96 82 - 98 fL    MCH 27.8 27.0 - 31.0 pg    MCHC 28.8 (L) 32.0 - 36.0 g/dL    RDW 17.1 (H) 11.5 - 14.5 %    Platelets 123 (L) 150 - 450 K/uL    MPV 12.4 9.2 - 12.9 fL    Immature Granulocytes 0.3 0.0 - 0.5 %    Gran # (ANC) 4.0 1.8 - 7.7 K/uL    Immature Grans (Abs) 0.02 0.00 - 0.04  K/uL    Lymph # 1.2 1.0 - 4.8 K/uL    Mono # 0.6 0.3 - 1.0 K/uL    Eos # 0.4 0.0 - 0.5 K/uL    Baso # 0.04 0.00 - 0.20 K/uL    nRBC 0 0 /100 WBC    Gran % 65.2 38.0 - 73.0 %    Lymph % 18.8 18.0 - 48.0 %    Mono % 9.1 4.0 - 15.0 %    Eosinophil % 6.0 0.0 - 8.0 %    Basophil % 0.6 0.0 - 1.9 %    Differential Method Automated    Comprehensive Metabolic Panel    Collection Time: 05/09/22  6:51 AM   Result Value Ref Range    Sodium 132 (L) 136 - 145 mmol/L    Potassium 3.7 3.5 - 5.1 mmol/L    Chloride 97 95 - 110 mmol/L    CO2 24 23 - 29 mmol/L    Glucose 133 (H) 70 - 110 mg/dL    BUN 11 6 - 20 mg/dL    Creatinine 1.0 0.5 - 1.4 mg/dL    Calcium 8.7 8.7 - 10.5 mg/dL    Total Protein 6.4 6.0 - 8.4 g/dL    Albumin 2.9 (L) 3.5 - 5.2 g/dL    Total Bilirubin 0.9 0.1 - 1.0 mg/dL    Alkaline Phosphatase 105 55 - 135 U/L    AST 50 (H) 10 - 40 U/L    ALT 21 10 - 44 U/L    Anion Gap 11 8 - 16 mmol/L    eGFR if African American >60.0 >60 mL/min/1.73 m^2    eGFR if non African American >60.0 >60 mL/min/1.73 m^2   Magnesium    Collection Time: 05/09/22  6:51 AM   Result Value Ref Range    Magnesium 1.7 1.6 - 2.6 mg/dL   VANCOMYCIN, TROUGH    Collection Time: 05/09/22  8:24 AM   Result Value Ref Range    Vancomycin-Trough 22.5 (H) 10.0 - 22.0 ug/mL       Microbiology Results (last 7 days)       Procedure Component Value Units Date/Time    Blood Culture #1 **CANNOT BE ORDERED STAT** [[id number]] Collected: 05/07/22 1246    Order Status: Completed Specimen: Blood from Peripheral, Antecubital, Left Updated: 05/09/22 1412     Blood Culture, Routine No Growth to date      No Growth to date      No Growth to date    Blood Culture #2 **CANNOT BE ORDERED STAT** [[id number]] Collected: 05/07/22 1258    Order Status: Completed Specimen: Blood from Peripheral, Antecubital, Right Updated: 05/09/22 1412     Blood Culture, Routine No Growth to date      No Growth to date      No Growth to date             Imaging Results              X-Ray Knee 3  View Left (Final result)  Result time 05/08/22 01:13:57      Final result by Timur Sanabria MD (05/08/22 01:13:57)                   Impression:      As above.      Electronically signed by: Timur Sanabria MD  Date:    05/08/2022  Time:    01:13               Narrative:    EXAMINATION:  XR KNEE 3 VIEW LEFT    CLINICAL HISTORY:  postop;    TECHNIQUE:  AP, lateral, and Merchant views of the left knee were performed.    COMPARISON:  05/07/2022 left tibia and fibula.    FINDINGS:  Suboptimal positioning on the AP view with the knee appears to be in flexion.  Joint space is not adequately assessed.    No displaced fracture or dislocation.  Suboptimal positioning on AP view results in limited assessment of the tibiofemoral joint space and tibial plateau.  Suprapatellar effusion present.  Diffuse soft tissue edema present.                                        CT Thigh With Contrast Left (Final result)  Result time 05/07/22 19:04:28      Final result by Dre Real MD (05/07/22 19:04:28)                   Impression:      Findings concerning for left lower extremity cellulitis.    Knee joint effusion with some peripheral enhancement which could represent sterile effusion and nonspecific reactive synovitis; however, septic arthritis not excluded.  Additionally, there is a prepatellar small rim enhancing collection favored to represent nonspecific bursitis, although small abscess not excluded in the setting of surrounding cellulitis.  Clinical correlation advised.  Fluid sampling may be warranted.    Mild degenerative changes of the lower extremity as detailed above.    3.8 cm incidental left adnexal cyst.  For a patient of the this age group pelvic ultrasound follow-up in 6-12 months is recommended per ACR guidelines, or sooner if associated pain develops.    Suspected leiomyomatous uterus.  Further evaluation with elective/nonemergent pelvic ultrasound can be obtained as warranted.    Additional findings  above.    This report was flagged in Epic as abnormal.    Electronically signed by resident: Ramon Hammonds  Date:    05/07/2022  Time:    18:26    Electronically signed by: Dre Real MD  Date:    05/07/2022  Time:    19:04               Narrative:    EXAMINATION:  CT THIGH WITH CONTRAST LEFT; CT LEG (TIBIA-FIBULA) WITH CONTRAST LEFT    CLINICAL HISTORY:  Soft tissue infection suspected, thigh, xray done;; Soft tissue infection suspected, lower leg, xray done;    TECHNIQUE:  Axial images performed through the iliac crests to the foot of the left lower extremity after administration of 100 mL of intravenous Omnipaque 350.  Coronal and sagittal reformations performed.    COMPARISON:  Lower extremity veins ultrasound 05/07/2022    FINDINGS:  The bones of the left lower extremity demonstrate mild degenerative changes of the left knee indicated by subchondral sclerosis, osteophytic formation, and mild joint space narrowing.  Degenerative changes of the 1st metatarsophalangeal joint is noted as well.  No acute fracture.  No osseous destructive lesions.  Small suprapatellar and femorotibial joint effusions with some peripheral enhancement.    The soft tissues of the left lower extremity demonstrate multiple areas of soft tissue attenuation with peripheral calcification in the subcutaneous soft tissues overlying the lateral left thigh, perhaps relating to remote trauma or injection medication use.  Left inguinal skin thickening, likely postsurgical related to prior left superficial femoral artery branch laceration repair.  There is left inguinal lymphadenopathy, for example lymph node measuring 1.2 cm in short axis (series 301, image 845). Septation of the subcutaneous fat involving the left lower extremity mainly in the lower leg, indicating edema.  Mild skin thickening overlying the left lateral lower leg.  There is approximate 2.8 x 1.2 x 2.8 cm slightly heterogeneous collection within enhancing rim along the  anterior mid to lower aspect of the patella.    Limited intrapelvic evaluation demonstrates enlargement of the uterus with punctate internal calcifications, likely leiomyomatous uterus.  3.8 cm left adnexal hypodensity most consistent with a cyst.                                        CT Leg (Tibia-Fibula) Wtih Contrast Left (Final result)  Result time 05/07/22 19:04:28      Final result by Dre Real MD (05/07/22 19:04:28)                   Impression:      Findings concerning for left lower extremity cellulitis.    Knee joint effusion with some peripheral enhancement which could represent sterile effusion and nonspecific reactive synovitis; however, septic arthritis not excluded.  Additionally, there is a prepatellar small rim enhancing collection favored to represent nonspecific bursitis, although small abscess not excluded in the setting of surrounding cellulitis.  Clinical correlation advised.  Fluid sampling may be warranted.    Mild degenerative changes of the lower extremity as detailed above.    3.8 cm incidental left adnexal cyst.  For a patient of the this age group pelvic ultrasound follow-up in 6-12 months is recommended per ACR guidelines, or sooner if associated pain develops.    Suspected leiomyomatous uterus.  Further evaluation with elective/nonemergent pelvic ultrasound can be obtained as warranted.    Additional findings above.    This report was flagged in Epic as abnormal.    Electronically signed by resident: Ramon Hammonds  Date:    05/07/2022  Time:    18:26    Electronically signed by: Dre Real MD  Date:    05/07/2022  Time:    19:04               Narrative:    EXAMINATION:  CT THIGH WITH CONTRAST LEFT; CT LEG (TIBIA-FIBULA) WITH CONTRAST LEFT    CLINICAL HISTORY:  Soft tissue infection suspected, thigh, xray done;; Soft tissue infection suspected, lower leg, xray done;    TECHNIQUE:  Axial images performed through the iliac crests to the foot of the left lower extremity after  administration of 100 mL of intravenous Omnipaque 350.  Coronal and sagittal reformations performed.    COMPARISON:  Lower extremity veins ultrasound 05/07/2022    FINDINGS:  The bones of the left lower extremity demonstrate mild degenerative changes of the left knee indicated by subchondral sclerosis, osteophytic formation, and mild joint space narrowing.  Degenerative changes of the 1st metatarsophalangeal joint is noted as well.  No acute fracture.  No osseous destructive lesions.  Small suprapatellar and femorotibial joint effusions with some peripheral enhancement.    The soft tissues of the left lower extremity demonstrate multiple areas of soft tissue attenuation with peripheral calcification in the subcutaneous soft tissues overlying the lateral left thigh, perhaps relating to remote trauma or injection medication use.  Left inguinal skin thickening, likely postsurgical related to prior left superficial femoral artery branch laceration repair.  There is left inguinal lymphadenopathy, for example lymph node measuring 1.2 cm in short axis (series 301, image 845). Septation of the subcutaneous fat involving the left lower extremity mainly in the lower leg, indicating edema.  Mild skin thickening overlying the left lateral lower leg.  There is approximate 2.8 x 1.2 x 2.8 cm slightly heterogeneous collection within enhancing rim along the anterior mid to lower aspect of the patella.    Limited intrapelvic evaluation demonstrates enlargement of the uterus with punctate internal calcifications, likely leiomyomatous uterus.  3.8 cm left adnexal hypodensity most consistent with a cyst.                                       US Lower Extremity Veins Left (Final result)  Result time 05/07/22 14:23:00      Final result by Dre Real MD (05/07/22 14:23:00)                   Impression:      No evidence of deep venous thrombosis in the left lower extremity.      Electronically signed by: Dre Real  MD  Date:    05/07/2022  Time:    14:23               Narrative:    EXAMINATION:  US LOWER EXTREMITY VEINS LEFT    CLINICAL HISTORY:  Other specified soft tissue disorders    TECHNIQUE:  Duplex and color flow Doppler evaluation and graded compression of the left lower extremity veins was performed.    COMPARISON:  None    FINDINGS:  Left thigh veins: The common femoral, femoral, popliteal, upper greater saphenous, and deep femoral veins are patent and free of thrombus. The veins are normally compressible and have normal phasic flow and augmentation response.    Left calf veins: The visualized calf veins are patent.    Contralateral CFV: The contralateral (right) common femoral vein is patent and free of thrombus.    Miscellaneous: Nonspecific subcutaneous edema at the left calf region.                                       X-Ray Tibia Fibula 2 View Left (Final result)  Result time 05/07/22 13:57:38      Final result by Joshua Fritz MD (05/07/22 13:57:38)                   Impression:      As above.      Electronically signed by: Joshua Fritz  Date:    05/07/2022  Time:    13:57               Narrative:    EXAMINATION:  XR TIBIA FIBULA 2 VIEW LEFT    CLINICAL HISTORY:  Pain in leg, unspecified    TECHNIQUE:  AP and lateral views of the left tibia and fibula were performed.    COMPARISON:  None.    FINDINGS:  No acute fracture, dislocation or osseous destruction.  No radiopaque foreign body.  Large calcaneal enthesophyte at the distal Achilles insertion.  Partial visualized femorotibial degenerative change.                                            Assessment/Plan:      Cellulitis of extremity  3/4 SIRS with 101F, , RR 22, with normal WBC#.   She has lactic acidosis of 3.8 and .   CT showed knee joint effusion with some peripheral enhancement which could represent sterile effusion and nonspecific reactive synovitis; however, septic arthritis not excluded, additionally, there is a prepatellar  small rim enhancing collection favored to represent nonspecific bursitis, although small abscess not excluded in the setting of surrounding cellulitis of LLE.   -Vanc and Zosyn  -blood cx's  -sepsis fluids, LA trend  -GenSx consulted to assess  -Will consult ortho to see if fluid collection (vs bursitis) should be tapped    Iron deficiency anemia  Give iron after infection settles      Recurrent major depressive disorder, in partial remission  Continue duloxetine      Gastroesophageal reflux disease without esophagitis  Continue PPI      Pure hypercholesterolemia  Cont statin      ERENDIRA (obstructive sleep apnea)  Offer CPAP nightly      Type 2 diabetes mellitus with diabetic polyneuropathy, without long-term current use of insulin  Well controlled. A1c 5.7  Consider metoprolol at discharge if not on  Statin  Consider bariatric surgery      Morbid obesity with BMI of 45.0-49.9, adult  Body mass index is 46.48 kg/m². Morbid obesity complicates all aspects of disease management from diagnostic modalities to treatment. Weight loss encouraged and health benefits explained to patient.   -Given DM and Cardiac disease, consider bariatric surgery        Atrial Fibrillation (paroxysmal)  - S/p 2 cardioversion.  She is now in sinus.  - continue propafenone  - resume metoprolol.  Increase to home dose as tolerated.  - space continue apixaban    Essential hypertension  Continue home meds:  Lisinopril metoprolol nifedipine      Opioid dependence in remission  Noted        VTE Risk Mitigation (From admission, onward)         Ordered     apixaban tablet 5 mg  2 times daily         05/07/22 2000     IP VTE HIGH RISK PATIENT  Once         05/07/22 1953     Place sequential compression device  Until discontinued         05/07/22 1953                Discharge Planning   RAYO: 5/10/2022     Code Status: Full Code   Is the patient medically ready for discharge?:     Reason for patient still in hospital (select all that apply): Patient  trending condition and Treatment  Discharge Plan A: Home Health, Home with family ([facility]/ [facility] (would like reassigned if needed))                  Reed Zuleta MD  Department of Hospital Medicine   [facility] - Telemetry Stepdown ([place])

## 2022-05-09 NOTE — PROGRESS NOTES
General Surgery Note    Patient seen and examined  LLE erythema almost completely resolved  Pain improving  Will sign off now

## 2022-05-09 NOTE — SUBJECTIVE & OBJECTIVE
NAEON. Denies SOB or CP. Eating okay. UOP wnl. BM wnl.   Overall pain and swelling improving, knee still with pain but less so.      Review of Systems   Constitutional:  Positive for activity change and fever.   HENT:  Negative for sore throat and trouble swallowing.    Eyes:  Negative for photophobia and visual disturbance.   Respiratory:  Negative for chest tightness and shortness of breath.    Cardiovascular:  Negative for chest pain, palpitations and leg swelling.   Gastrointestinal:  Negative for abdominal distention, abdominal pain, blood in stool, constipation, diarrhea, nausea and vomiting.   Endocrine: Negative for polyphagia and polyuria.   Genitourinary:  Negative for difficulty urinating, dysuria and hematuria.   Musculoskeletal:  Positive for joint swelling.   Skin:  Positive for color change.   Allergic/Immunologic: Negative for immunocompromised state.   Neurological:  Negative for dizziness, seizures, syncope and facial asymmetry.   Psychiatric/Behavioral:  Negative for agitation, behavioral problems and confusion.    Objective:     Vital Signs (Most Recent):  Temp: 97.9 °F (36.6 °C) (05/09/22 1621)  Pulse: 77 (05/09/22 1621)  Resp: 16 (05/09/22 1621)  BP: (!) 118/58 (05/09/22 1621)  SpO2: 100 % (05/09/22 1621)   Vital Signs (24h Range):  Temp:  [97.9 °F (36.6 °C)-98.4 °F (36.9 °C)] 97.9 °F (36.6 °C)  Pulse:  [69-91] 77  Resp:  [15-18] 16  SpO2:  [95 %-100 %] 100 %  BP: ()/(46-59) 118/58     Weight: 125.7 kg (277 lb 1.9 oz)  Body mass index is 44.73 kg/m².    Physical Exam  Vitals and nursing note reviewed.   Constitutional:       Appearance: She is well-developed. She is obese. She is ill-appearing. She is not toxic-appearing or diaphoretic.   HENT:      Head: Normocephalic and atraumatic.      Nose: Nose normal. No congestion.   Eyes:      General: No scleral icterus.     Pupils: Pupils are equal, round, and reactive to light.   Neck:      Thyroid: No thyromegaly.   Cardiovascular:      Rate  and Rhythm: Normal rate and regular rhythm.      Heart sounds: No murmur heard.    No gallop.   Pulmonary:      Effort: Pulmonary effort is normal.      Breath sounds: Normal breath sounds. No stridor. No wheezing or rales.   Abdominal:      General: There is no distension.      Palpations: Abdomen is soft. There is no mass.      Tenderness: There is no abdominal tenderness. There is no guarding.   Musculoskeletal:         General: Swelling and tenderness present. No signs of injury. Normal range of motion.      Cervical back: Normal range of motion and neck supple. No rigidity.      Right lower leg: No edema.      Left lower leg: Edema present.   Lymphadenopathy:      Cervical: No cervical adenopathy.   Skin:     General: Skin is warm and dry.      Capillary Refill: Capillary refill takes less than 2 seconds.      Findings: Erythema present.   Neurological:      General: No focal deficit present.      Mental Status: She is alert and oriented to person, place, and time.      Cranial Nerves: No cranial nerve deficit.      Motor: No weakness.   Psychiatric:         Mood and Affect: Mood normal.         Behavior: Behavior normal.         CRANIAL NERVES     CN III, IV, VI   Pupils are equal, round, and reactive to light.     +2 PE on RLE and +1 PE on LLE    Recent Results (from the past 24 hour(s))   CBC Auto Differential    Collection Time: 05/09/22  6:51 AM   Result Value Ref Range    WBC 6.16 3.90 - 12.70 K/uL    RBC 3.06 (L) 4.00 - 5.40 M/uL    Hemoglobin 8.5 (L) 12.0 - 16.0 g/dL    Hematocrit 29.5 (L) 37.0 - 48.5 %    MCV 96 82 - 98 fL    MCH 27.8 27.0 - 31.0 pg    MCHC 28.8 (L) 32.0 - 36.0 g/dL    RDW 17.1 (H) 11.5 - 14.5 %    Platelets 123 (L) 150 - 450 K/uL    MPV 12.4 9.2 - 12.9 fL    Immature Granulocytes 0.3 0.0 - 0.5 %    Gran # (ANC) 4.0 1.8 - 7.7 K/uL    Immature Grans (Abs) 0.02 0.00 - 0.04 K/uL    Lymph # 1.2 1.0 - 4.8 K/uL    Mono # 0.6 0.3 - 1.0 K/uL    Eos # 0.4 0.0 - 0.5 K/uL    Baso # 0.04 0.00 -  0.20 K/uL    nRBC 0 0 /100 WBC    Gran % 65.2 38.0 - 73.0 %    Lymph % 18.8 18.0 - 48.0 %    Mono % 9.1 4.0 - 15.0 %    Eosinophil % 6.0 0.0 - 8.0 %    Basophil % 0.6 0.0 - 1.9 %    Differential Method Automated    Comprehensive Metabolic Panel    Collection Time: 05/09/22  6:51 AM   Result Value Ref Range    Sodium 132 (L) 136 - 145 mmol/L    Potassium 3.7 3.5 - 5.1 mmol/L    Chloride 97 95 - 110 mmol/L    CO2 24 23 - 29 mmol/L    Glucose 133 (H) 70 - 110 mg/dL    BUN 11 6 - 20 mg/dL    Creatinine 1.0 0.5 - 1.4 mg/dL    Calcium 8.7 8.7 - 10.5 mg/dL    Total Protein 6.4 6.0 - 8.4 g/dL    Albumin 2.9 (L) 3.5 - 5.2 g/dL    Total Bilirubin 0.9 0.1 - 1.0 mg/dL    Alkaline Phosphatase 105 55 - 135 U/L    AST 50 (H) 10 - 40 U/L    ALT 21 10 - 44 U/L    Anion Gap 11 8 - 16 mmol/L    eGFR if African American >60.0 >60 mL/min/1.73 m^2    eGFR if non African American >60.0 >60 mL/min/1.73 m^2   Magnesium    Collection Time: 05/09/22  6:51 AM   Result Value Ref Range    Magnesium 1.7 1.6 - 2.6 mg/dL   VANCOMYCIN, TROUGH    Collection Time: 05/09/22  8:24 AM   Result Value Ref Range    Vancomycin-Trough 22.5 (H) 10.0 - 22.0 ug/mL       Microbiology Results (last 7 days)       Procedure Component Value Units Date/Time    Blood Culture #1 **CANNOT BE ORDERED STAT** [[id number]] Collected: 05/07/22 1246    Order Status: Completed Specimen: Blood from Peripheral, Antecubital, Left Updated: 05/09/22 1412     Blood Culture, Routine No Growth to date      No Growth to date      No Growth to date    Blood Culture #2 **CANNOT BE ORDERED STAT** [[id number]] Collected: 05/07/22 1258    Order Status: Completed Specimen: Blood from Peripheral, Antecubital, Right Updated: 05/09/22 1412     Blood Culture, Routine No Growth to date      No Growth to date      No Growth to date             Imaging Results              X-Ray Knee 3 View Left (Final result)  Result time 05/08/22 01:13:57      Final result by Timur Sanabria MD (05/08/22  01:13:57)                   Impression:      As above.      Electronically signed by: Timur Sanabria MD  Date:    05/08/2022  Time:    01:13               Narrative:    EXAMINATION:  XR KNEE 3 VIEW LEFT    CLINICAL HISTORY:  postop;    TECHNIQUE:  AP, lateral, and Merchant views of the left knee were performed.    COMPARISON:  05/07/2022 left tibia and fibula.    FINDINGS:  Suboptimal positioning on the AP view with the knee appears to be in flexion.  Joint space is not adequately assessed.    No displaced fracture or dislocation.  Suboptimal positioning on AP view results in limited assessment of the tibiofemoral joint space and tibial plateau.  Suprapatellar effusion present.  Diffuse soft tissue edema present.                                        CT Thigh With Contrast Left (Final result)  Result time 05/07/22 19:04:28      Final result by Dre Real MD (05/07/22 19:04:28)                   Impression:      Findings concerning for left lower extremity cellulitis.    Knee joint effusion with some peripheral enhancement which could represent sterile effusion and nonspecific reactive synovitis; however, septic arthritis not excluded.  Additionally, there is a prepatellar small rim enhancing collection favored to represent nonspecific bursitis, although small abscess not excluded in the setting of surrounding cellulitis.  Clinical correlation advised.  Fluid sampling may be warranted.    Mild degenerative changes of the lower extremity as detailed above.    3.8 cm incidental left adnexal cyst.  For a patient of the this age group pelvic ultrasound follow-up in 6-12 months is recommended per ACR guidelines, or sooner if associated pain develops.    Suspected leiomyomatous uterus.  Further evaluation with elective/nonemergent pelvic ultrasound can be obtained as warranted.    Additional findings above.    This report was flagged in Epic as abnormal.    Electronically signed by resident: Ramon  Cassius  Date:    05/07/2022  Time:    18:26    Electronically signed by: Dre Real MD  Date:    05/07/2022  Time:    19:04               Narrative:    EXAMINATION:  CT THIGH WITH CONTRAST LEFT; CT LEG (TIBIA-FIBULA) WITH CONTRAST LEFT    CLINICAL HISTORY:  Soft tissue infection suspected, thigh, xray done;; Soft tissue infection suspected, lower leg, xray done;    TECHNIQUE:  Axial images performed through the iliac crests to the foot of the left lower extremity after administration of 100 mL of intravenous Omnipaque 350.  Coronal and sagittal reformations performed.    COMPARISON:  Lower extremity veins ultrasound 05/07/2022    FINDINGS:  The bones of the left lower extremity demonstrate mild degenerative changes of the left knee indicated by subchondral sclerosis, osteophytic formation, and mild joint space narrowing.  Degenerative changes of the 1st metatarsophalangeal joint is noted as well.  No acute fracture.  No osseous destructive lesions.  Small suprapatellar and femorotibial joint effusions with some peripheral enhancement.    The soft tissues of the left lower extremity demonstrate multiple areas of soft tissue attenuation with peripheral calcification in the subcutaneous soft tissues overlying the lateral left thigh, perhaps relating to remote trauma or injection medication use.  Left inguinal skin thickening, likely postsurgical related to prior left superficial femoral artery branch laceration repair.  There is left inguinal lymphadenopathy, for example lymph node measuring 1.2 cm in short axis (series 301, image 845). Septation of the subcutaneous fat involving the left lower extremity mainly in the lower leg, indicating edema.  Mild skin thickening overlying the left lateral lower leg.  There is approximate 2.8 x 1.2 x 2.8 cm slightly heterogeneous collection within enhancing rim along the anterior mid to lower aspect of the patella.    Limited intrapelvic evaluation demonstrates enlargement of  the uterus with punctate internal calcifications, likely leiomyomatous uterus.  3.8 cm left adnexal hypodensity most consistent with a cyst.                                        CT Leg (Tibia-Fibula) Wtih Contrast Left (Final result)  Result time 05/07/22 19:04:28      Final result by Dre Real MD (05/07/22 19:04:28)                   Impression:      Findings concerning for left lower extremity cellulitis.    Knee joint effusion with some peripheral enhancement which could represent sterile effusion and nonspecific reactive synovitis; however, septic arthritis not excluded.  Additionally, there is a prepatellar small rim enhancing collection favored to represent nonspecific bursitis, although small abscess not excluded in the setting of surrounding cellulitis.  Clinical correlation advised.  Fluid sampling may be warranted.    Mild degenerative changes of the lower extremity as detailed above.    3.8 cm incidental left adnexal cyst.  For a patient of the this age group pelvic ultrasound follow-up in 6-12 months is recommended per ACR guidelines, or sooner if associated pain develops.    Suspected leiomyomatous uterus.  Further evaluation with elective/nonemergent pelvic ultrasound can be obtained as warranted.    Additional findings above.    This report was flagged in Epic as abnormal.    Electronically signed by resident: Ramon Hammonds  Date:    05/07/2022  Time:    18:26    Electronically signed by: Dre Real MD  Date:    05/07/2022  Time:    19:04               Narrative:    EXAMINATION:  CT THIGH WITH CONTRAST LEFT; CT LEG (TIBIA-FIBULA) WITH CONTRAST LEFT    CLINICAL HISTORY:  Soft tissue infection suspected, thigh, xray done;; Soft tissue infection suspected, lower leg, xray done;    TECHNIQUE:  Axial images performed through the iliac crests to the foot of the left lower extremity after administration of 100 mL of intravenous Omnipaque 350.  Coronal and sagittal reformations  performed.    COMPARISON:  Lower extremity veins ultrasound 05/07/2022    FINDINGS:  The bones of the left lower extremity demonstrate mild degenerative changes of the left knee indicated by subchondral sclerosis, osteophytic formation, and mild joint space narrowing.  Degenerative changes of the 1st metatarsophalangeal joint is noted as well.  No acute fracture.  No osseous destructive lesions.  Small suprapatellar and femorotibial joint effusions with some peripheral enhancement.    The soft tissues of the left lower extremity demonstrate multiple areas of soft tissue attenuation with peripheral calcification in the subcutaneous soft tissues overlying the lateral left thigh, perhaps relating to remote trauma or injection medication use.  Left inguinal skin thickening, likely postsurgical related to prior left superficial femoral artery branch laceration repair.  There is left inguinal lymphadenopathy, for example lymph node measuring 1.2 cm in short axis (series 301, image 845). Septation of the subcutaneous fat involving the left lower extremity mainly in the lower leg, indicating edema.  Mild skin thickening overlying the left lateral lower leg.  There is approximate 2.8 x 1.2 x 2.8 cm slightly heterogeneous collection within enhancing rim along the anterior mid to lower aspect of the patella.    Limited intrapelvic evaluation demonstrates enlargement of the uterus with punctate internal calcifications, likely leiomyomatous uterus.  3.8 cm left adnexal hypodensity most consistent with a cyst.                                       US Lower Extremity Veins Left (Final result)  Result time 05/07/22 14:23:00      Final result by Dre Real MD (05/07/22 14:23:00)                   Impression:      No evidence of deep venous thrombosis in the left lower extremity.      Electronically signed by: Dre Real MD  Date:    05/07/2022  Time:    14:23               Narrative:    EXAMINATION:  US LOWER EXTREMITY VEINS  LEFT    CLINICAL HISTORY:  Other specified soft tissue disorders    TECHNIQUE:  Duplex and color flow Doppler evaluation and graded compression of the left lower extremity veins was performed.    COMPARISON:  None    FINDINGS:  Left thigh veins: The common femoral, femoral, popliteal, upper greater saphenous, and deep femoral veins are patent and free of thrombus. The veins are normally compressible and have normal phasic flow and augmentation response.    Left calf veins: The visualized calf veins are patent.    Contralateral CFV: The contralateral (right) common femoral vein is patent and free of thrombus.    Miscellaneous: Nonspecific subcutaneous edema at the left calf region.                                       X-Ray Tibia Fibula 2 View Left (Final result)  Result time 05/07/22 13:57:38      Final result by Joshua Fritz MD (05/07/22 13:57:38)                   Impression:      As above.      Electronically signed by: Joshua Fritz  Date:    05/07/2022  Time:    13:57               Narrative:    EXAMINATION:  XR TIBIA FIBULA 2 VIEW LEFT    CLINICAL HISTORY:  Pain in leg, unspecified    TECHNIQUE:  AP and lateral views of the left tibia and fibula were performed.    COMPARISON:  None.    FINDINGS:  No acute fracture, dislocation or osseous destruction.  No radiopaque foreign body.  Large calcaneal enthesophyte at the distal Achilles insertion.  Partial visualized femorotibial degenerative change.

## 2022-05-09 NOTE — SUBJECTIVE & OBJECTIVE
Principal Problem:Left leg cellulitis    Principal Orthopedic Problem:  Prepatellar septic bursitis.    Interval History: Patient seen and examined at bedside.  States that the leg is approximately 75% better in regards to the erythema.  She has minimal pain with range of motion of the knee.  She is able to bear weight easily on it.  Still reports some redness around the knee particularly around the prepatellar bursa.      Review of patient's allergies indicates:   Allergen Reactions    Flecainide Shortness Of Breath and Swelling       Current Facility-Administered Medications   Medication    acetaminophen tablet 650 mg    ALPRAZolam tablet 0.5 mg    apixaban tablet 5 mg    atorvastatin tablet 40 mg    celecoxib capsule 100 mg    diphenhydrAMINE capsule 25 mg    DULoxetine DR capsule 120 mg    furosemide injection 40 mg    glucagon (human recombinant) injection 1 mg    glucose chewable tablet 16 g    glucose chewable tablet 24 g    HYDROmorphone injection 0.5 mg    lisinopriL tablet 20 mg    medroxyPROGESTERone tablet 20 mg    melatonin tablet 6 mg    metoprolol succinate (TOPROL-XL) 24 hr tablet 50 mg    miconazole NITRATE 2 % top powder    multivitamin tablet    naloxone 0.4 mg/mL injection 0.02 mg    NIFEdipine 24 hr tablet 90 mg    ondansetron disintegrating tablet 8 mg    oxyCODONE immediate release tablet 5 mg    pantoprazole EC tablet 40 mg    piperacillin-tazobactam 4.5 g in sodium chloride 0.9% 100 mL IVPB (ready to mix system)    polyethylene glycol packet 17 g    propafenone tablet 225 mg    traZODone tablet 50 mg    vancomycin - pharmacy to dose    vancomycin 1.75 g in 5 % dextrose 500 mL IVPB     Facility-Administered Medications Ordered in Other Encounters   Medication    sodium chloride 0.9% bolus 1,000 mL     Objective:     Vital Signs (Most Recent):  Temp: 98.3 °F (36.8 °C) (05/09/22 0012)  Pulse: 91 (05/09/22 0425)  Resp: 18 (05/09/22 0423)  BP: (!) 115/58 (05/09/22 0423)  SpO2: 96 % (05/09/22  "0423)   Vital Signs (24h Range):  Temp:  [97.6 °F (36.4 °C)-98.3 °F (36.8 °C)] 98.3 °F (36.8 °C)  Pulse:  [82-91] 91  Resp:  [15-18] 18  SpO2:  [95 %-98 %] 96 %  BP: ()/(46-71) 115/58     Weight: 125.7 kg (277 lb 1.9 oz)  Height: 5' 6" (167.6 cm)  Body mass index is 44.73 kg/m².    No intake or output data in the 24 hours ending 05/09/22 0546    Ortho/SPM Exam  Physical Exam:  NAD, A/O x 3.  She large amount of erythema around the knee that has visibly resolved.  She does have a small amount of fluid noted anterior aspect of the knee into some surrounding erythema.  Area proximally 3 x 3 cm.  Full passive, active range of motion of the knee without pain.  Is able to bear weight.      Significant Labs: All pertinent labs within the past 24 hours have been reviewed.    Significant Imaging: I have reviewed and interpreted all pertinent imaging results/findings.  "

## 2022-05-10 PROBLEM — E87.70 VOLUME OVERLOAD: Status: ACTIVE | Noted: 2022-05-10

## 2022-05-10 LAB
ALBUMIN SERPL BCP-MCNC: 2.6 G/DL (ref 3.5–5.2)
ALP SERPL-CCNC: 95 U/L (ref 55–135)
ALT SERPL W/O P-5'-P-CCNC: 19 U/L (ref 10–44)
ANION GAP SERPL CALC-SCNC: 10 MMOL/L (ref 8–16)
AST SERPL-CCNC: 43 U/L (ref 10–40)
BASOPHILS # BLD AUTO: 0.04 K/UL (ref 0–0.2)
BASOPHILS NFR BLD: 0.8 % (ref 0–1.9)
BILIRUB SERPL-MCNC: 0.6 MG/DL (ref 0.1–1)
BUN SERPL-MCNC: 11 MG/DL (ref 6–20)
CALCIUM SERPL-MCNC: 8.5 MG/DL (ref 8.7–10.5)
CHLORIDE SERPL-SCNC: 98 MMOL/L (ref 95–110)
CO2 SERPL-SCNC: 25 MMOL/L (ref 23–29)
CREAT SERPL-MCNC: 1.3 MG/DL (ref 0.5–1.4)
DIFFERENTIAL METHOD: ABNORMAL
EOSINOPHIL # BLD AUTO: 0.3 K/UL (ref 0–0.5)
EOSINOPHIL NFR BLD: 6.4 % (ref 0–8)
ERYTHROCYTE [DISTWIDTH] IN BLOOD BY AUTOMATED COUNT: 16.9 % (ref 11.5–14.5)
EST. GFR  (AFRICAN AMERICAN): 52.3 ML/MIN/1.73 M^2
EST. GFR  (NON AFRICAN AMERICAN): 45.3 ML/MIN/1.73 M^2
GLUCOSE SERPL-MCNC: 157 MG/DL (ref 70–110)
HCT VFR BLD AUTO: 25.7 % (ref 37–48.5)
HCV AB SERPL QL IA: NEGATIVE
HGB BLD-MCNC: 8 G/DL (ref 12–16)
HIV 1+2 AB+HIV1 P24 AG SERPL QL IA: NEGATIVE
IMM GRANULOCYTES # BLD AUTO: 0.04 K/UL (ref 0–0.04)
IMM GRANULOCYTES NFR BLD AUTO: 0.8 % (ref 0–0.5)
LYMPHOCYTES # BLD AUTO: 1.1 K/UL (ref 1–4.8)
LYMPHOCYTES NFR BLD: 21.1 % (ref 18–48)
MAGNESIUM SERPL-MCNC: 1.7 MG/DL (ref 1.6–2.6)
MCH RBC QN AUTO: 27.4 PG (ref 27–31)
MCHC RBC AUTO-ENTMCNC: 31.1 G/DL (ref 32–36)
MCV RBC AUTO: 88 FL (ref 82–98)
MONOCYTES # BLD AUTO: 0.6 K/UL (ref 0.3–1)
MONOCYTES NFR BLD: 10.7 % (ref 4–15)
NEUTROPHILS # BLD AUTO: 3.1 K/UL (ref 1.8–7.7)
NEUTROPHILS NFR BLD: 60.2 % (ref 38–73)
NRBC BLD-RTO: 0 /100 WBC
PLATELET # BLD AUTO: 153 K/UL (ref 150–450)
PMV BLD AUTO: 11 FL (ref 9.2–12.9)
POTASSIUM SERPL-SCNC: 3.5 MMOL/L (ref 3.5–5.1)
PROT SERPL-MCNC: 5.7 G/DL (ref 6–8.4)
RBC # BLD AUTO: 2.92 M/UL (ref 4–5.4)
SODIUM SERPL-SCNC: 133 MMOL/L (ref 136–145)
VANCOMYCIN TROUGH SERPL-MCNC: 26.8 UG/ML (ref 10–22)
WBC # BLD AUTO: 5.12 K/UL (ref 3.9–12.7)

## 2022-05-10 PROCEDURE — 99233 PR SUBSEQUENT HOSPITAL CARE,LEVL III: ICD-10-PCS | Mod: ,,, | Performed by: STUDENT IN AN ORGANIZED HEALTH CARE EDUCATION/TRAINING PROGRAM

## 2022-05-10 PROCEDURE — 80202 ASSAY OF VANCOMYCIN: CPT | Performed by: STUDENT IN AN ORGANIZED HEALTH CARE EDUCATION/TRAINING PROGRAM

## 2022-05-10 PROCEDURE — 63600175 PHARM REV CODE 636 W HCPCS: Performed by: STUDENT IN AN ORGANIZED HEALTH CARE EDUCATION/TRAINING PROGRAM

## 2022-05-10 PROCEDURE — 83735 ASSAY OF MAGNESIUM: CPT | Performed by: STUDENT IN AN ORGANIZED HEALTH CARE EDUCATION/TRAINING PROGRAM

## 2022-05-10 PROCEDURE — 99233 SBSQ HOSP IP/OBS HIGH 50: CPT | Mod: ,,, | Performed by: STUDENT IN AN ORGANIZED HEALTH CARE EDUCATION/TRAINING PROGRAM

## 2022-05-10 PROCEDURE — 25000003 PHARM REV CODE 250: Performed by: INTERNAL MEDICINE

## 2022-05-10 PROCEDURE — 94761 N-INVAS EAR/PLS OXIMETRY MLT: CPT

## 2022-05-10 PROCEDURE — 85025 COMPLETE CBC W/AUTO DIFF WBC: CPT | Performed by: STUDENT IN AN ORGANIZED HEALTH CARE EDUCATION/TRAINING PROGRAM

## 2022-05-10 PROCEDURE — 11000001 HC ACUTE MED/SURG PRIVATE ROOM

## 2022-05-10 PROCEDURE — 25000003 PHARM REV CODE 250: Performed by: STUDENT IN AN ORGANIZED HEALTH CARE EDUCATION/TRAINING PROGRAM

## 2022-05-10 PROCEDURE — 36415 COLL VENOUS BLD VENIPUNCTURE: CPT | Performed by: STUDENT IN AN ORGANIZED HEALTH CARE EDUCATION/TRAINING PROGRAM

## 2022-05-10 PROCEDURE — 80053 COMPREHEN METABOLIC PANEL: CPT | Performed by: STUDENT IN AN ORGANIZED HEALTH CARE EDUCATION/TRAINING PROGRAM

## 2022-05-10 RX ORDER — FUROSEMIDE 10 MG/ML
80 INJECTION INTRAMUSCULAR; INTRAVENOUS ONCE
Status: COMPLETED | OUTPATIENT
Start: 2022-05-10 | End: 2022-05-10

## 2022-05-10 RX ORDER — FUROSEMIDE 10 MG/ML
80 INJECTION INTRAMUSCULAR; INTRAVENOUS
Status: DISCONTINUED | OUTPATIENT
Start: 2022-05-11 | End: 2022-05-13

## 2022-05-10 RX ADMIN — MICONAZOLE NITRATE 2 % TOPICAL POWDER: at 09:05

## 2022-05-10 RX ADMIN — HYDROMORPHONE HYDROCHLORIDE 0.5 MG: 1 INJECTION, SOLUTION INTRAMUSCULAR; INTRAVENOUS; SUBCUTANEOUS at 12:05

## 2022-05-10 RX ADMIN — ALPRAZOLAM 0.5 MG: 0.25 TABLET ORAL at 08:05

## 2022-05-10 RX ADMIN — DULOXETINE 120 MG: 60 CAPSULE, DELAYED RELEASE ORAL at 08:05

## 2022-05-10 RX ADMIN — METOPROLOL SUCCINATE 12.5 MG: 25 TABLET, EXTENDED RELEASE ORAL at 08:05

## 2022-05-10 RX ADMIN — PIPERACILLIN SODIUM AND TAZOBACTAM SODIUM 4.5 G: 4; .5 INJECTION, POWDER, LYOPHILIZED, FOR SOLUTION INTRAVENOUS at 07:05

## 2022-05-10 RX ADMIN — MEDROXYPROGESTERONE ACETATE 20 MG: 10 TABLET ORAL at 05:05

## 2022-05-10 RX ADMIN — CELECOXIB 100 MG: 100 CAPSULE ORAL at 09:05

## 2022-05-10 RX ADMIN — MELATONIN TAB 3 MG 6 MG: 3 TAB at 09:05

## 2022-05-10 RX ADMIN — DIPHENHYDRAMINE HYDROCHLORIDE 25 MG: 25 CAPSULE ORAL at 11:05

## 2022-05-10 RX ADMIN — ACETAMINOPHEN 650 MG: 325 TABLET ORAL at 07:05

## 2022-05-10 RX ADMIN — FUROSEMIDE 80 MG: 10 INJECTION, SOLUTION INTRAMUSCULAR; INTRAVENOUS at 03:05

## 2022-05-10 RX ADMIN — PIPERACILLIN SODIUM AND TAZOBACTAM SODIUM 4.5 G: 4; .5 INJECTION, POWDER, LYOPHILIZED, FOR SOLUTION INTRAVENOUS at 10:05

## 2022-05-10 RX ADMIN — OXYCODONE 5 MG: 5 TABLET ORAL at 07:05

## 2022-05-10 RX ADMIN — PROPAFENONE HYDROCHLORIDE 225 MG: 150 TABLET, FILM COATED ORAL at 06:05

## 2022-05-10 RX ADMIN — MEDROXYPROGESTERONE ACETATE 20 MG: 10 TABLET ORAL at 11:05

## 2022-05-10 RX ADMIN — PROPAFENONE HYDROCHLORIDE 225 MG: 150 TABLET, FILM COATED ORAL at 05:05

## 2022-05-10 RX ADMIN — CELECOXIB 100 MG: 100 CAPSULE ORAL at 08:05

## 2022-05-10 RX ADMIN — THERA TABS 1 TABLET: TAB at 08:05

## 2022-05-10 RX ADMIN — FUROSEMIDE 40 MG: 10 INJECTION, SOLUTION INTRAMUSCULAR; INTRAVENOUS at 08:05

## 2022-05-10 RX ADMIN — HYDROMORPHONE HYDROCHLORIDE 0.5 MG: 1 INJECTION, SOLUTION INTRAMUSCULAR; INTRAVENOUS; SUBCUTANEOUS at 06:05

## 2022-05-10 RX ADMIN — ATORVASTATIN CALCIUM 40 MG: 40 TABLET, FILM COATED ORAL at 08:05

## 2022-05-10 RX ADMIN — METOPROLOL SUCCINATE 12.5 MG: 25 TABLET, EXTENDED RELEASE ORAL at 09:05

## 2022-05-10 RX ADMIN — PANTOPRAZOLE SODIUM 40 MG: 40 TABLET, DELAYED RELEASE ORAL at 08:05

## 2022-05-10 RX ADMIN — PIPERACILLIN SODIUM AND TAZOBACTAM SODIUM 4.5 G: 4; .5 INJECTION, POWDER, LYOPHILIZED, FOR SOLUTION INTRAVENOUS at 12:05

## 2022-05-10 RX ADMIN — APIXABAN 5 MG: 5 TABLET, FILM COATED ORAL at 09:05

## 2022-05-10 RX ADMIN — DIPHENHYDRAMINE HYDROCHLORIDE 25 MG: 25 CAPSULE ORAL at 03:05

## 2022-05-10 RX ADMIN — NIFEDIPINE 30 MG: 30 TABLET, FILM COATED, EXTENDED RELEASE ORAL at 08:05

## 2022-05-10 RX ADMIN — PROPAFENONE HYDROCHLORIDE 225 MG: 150 TABLET, FILM COATED ORAL at 09:05

## 2022-05-10 RX ADMIN — HYDROMORPHONE HYDROCHLORIDE 0.5 MG: 1 INJECTION, SOLUTION INTRAMUSCULAR; INTRAVENOUS; SUBCUTANEOUS at 11:05

## 2022-05-10 RX ADMIN — TRAZODONE HYDROCHLORIDE 50 MG: 50 TABLET ORAL at 11:05

## 2022-05-10 RX ADMIN — APIXABAN 5 MG: 5 TABLET, FILM COATED ORAL at 08:05

## 2022-05-10 RX ADMIN — MEDROXYPROGESTERONE ACETATE 20 MG: 10 TABLET ORAL at 09:05

## 2022-05-10 RX ADMIN — ALPRAZOLAM 0.5 MG: 0.25 TABLET ORAL at 09:05

## 2022-05-10 RX ADMIN — VANCOMYCIN HYDROCHLORIDE 1750 MG: 10 INJECTION, POWDER, LYOPHILIZED, FOR SOLUTION INTRAVENOUS at 03:05

## 2022-05-10 RX ADMIN — MICONAZOLE NITRATE 2 % TOPICAL POWDER: at 08:05

## 2022-05-10 RX ADMIN — OXYCODONE 5 MG: 5 TABLET ORAL at 03:05

## 2022-05-10 RX ADMIN — HYDROMORPHONE HYDROCHLORIDE 0.5 MG: 1 INJECTION, SOLUTION INTRAMUSCULAR; INTRAVENOUS; SUBCUTANEOUS at 03:05

## 2022-05-10 NOTE — PROGRESS NOTES
[facility] - Tele[facility] Stepdown ([place] [address])  Orthopedics  Progress Note    Patient Name: Vicky Alvarado  MRN: [id number]  Admission Date: 5/7/2022  Hospital Length of Stay: 1 days  Attending Provider: Reed Zuleta MD  Primary Care Provider: Gordy Cordero MD    Subjective:     Principal Problem:Left leg cellulitis    Principal Orthopedic Problem:  Prepatellar septic bursitis.    Interval History: Patient seen and examined at bedside.  Cellulitis has completely resolved except directly around the area of prepatellar bursitis. Significant pedal edema bilaterally.      Review of patient's allergies indicates:   Allergen Reactions    Flecainide Shortness Of Breath and Swelling       Current Facility-Administered Medications   Medication    acetaminophen tablet 650 mg    ALPRAZolam tablet 0.5 mg    apixaban tablet 5 mg    atorvastatin tablet 40 mg    celecoxib capsule 100 mg    diphenhydrAMINE capsule 25 mg    DULoxetine DR capsule 120 mg    furosemide injection 40 mg    glucagon (human recombinant) injection 1 mg    glucose chewable tablet 16 g    glucose chewable tablet 24 g    HYDROmorphone injection 0.5 mg    medroxyPROGESTERone tablet 20 mg    melatonin tablet 6 mg    metoprolol succinate (TOPROL-XL) 24 hr split tablet 12.5 mg    miconazole NITRATE 2 % top powder    multivitamin tablet    naloxone 0.4 mg/mL injection 0.02 mg    NIFEdipine 24 hr tablet 30 mg    ondansetron disintegrating tablet 8 mg    oxyCODONE immediate release tablet 5 mg    pantoprazole EC tablet 40 mg    piperacillin-tazobactam 4.5 g in sodium chloride 0.9% 100 mL IVPB (ready to mix system)    polyethylene glycol packet 17 g    propafenone tablet 225 mg    traZODone tablet 50 mg    vancomycin - pharmacy to dose    vancomycin 1.75 g in 5 % dextrose 500 mL IVPB     Facility-Administered Medications Ordered in Other Encounters   Medication    sodium chloride 0.9% bolus 1,000 mL     Objective:     Vital Signs  "(Most Recent):  Temp: 97.7 °F (36.5 °C) (05/10/22 0509)  Pulse: 82 (05/10/22 0509)  Resp: 18 (05/10/22 0509)  BP: (!) 117/54 (05/10/22 0509)  SpO2: 98 % (05/10/22 0509)   Vital Signs (24h Range):  Temp:  [97.7 °F (36.5 °C)-98.4 °F (36.9 °C)] 97.7 °F (36.5 °C)  Pulse:  [69-82] 82  Resp:  [15-20] 18  SpO2:  [94 %-100 %] 98 %  BP: ()/(48-58) 117/54     Weight: 125.7 kg (277 lb 1.9 oz)  Height: 5' 6" (167.6 cm)  Body mass index is 44.73 kg/m².      Intake/Output Summary (Last 24 hours) at 5/10/2022 0648  Last data filed at 5/10/2022 0000  Gross per 24 hour   Intake --   Output 900 ml   Net -900 ml       Ortho/SPM Exam  Physical Exam:  NAD, A/O x 3.  She large amount of erythema around the knee that has visibly resolved.  She does have a small amount of fluid noted anterior aspect of the knee into some surrounding erythema.  Area proximally 3 x 3 cm.  Full passive, active range of motion of the knee without pain.  Is able to bear weight.      Significant Labs: All pertinent labs within the past 24 hours have been reviewed.    Significant Imaging: I have reviewed and interpreted all pertinent imaging results/findings.    Assessment/Plan:     * Left leg cellulitis  57F with left knee and leg cellulitis, and likely left prepatellar bursitis. Resolving.     Recs  -No acute orthopedic surgical intervention at this time  -IV abx per hospital medicine  -WBAT LLE  - Continue IVAB plan for likely dc tomorrow with oral bactrim. Needs aggressive diuresis as pedal edema this may have played a role in her getting cellulitis.           Gabriel Kate MD  Orthopedics  [facility] - Telemetry Stepdown ([place])  "

## 2022-05-10 NOTE — PROGRESS NOTES
Pharmacokinetic Assessment Follow Up: IV Vancomycin    Vancomycin serum concentration assessment(s):    The ~11hr trough level resulted in 26.8, was drawn a little bit early and can be used to guide therapy at this time. The measurement is above the desired definitive target range of 10 to 15 mcg/mL.    Vancomycin Regimen Plan:    Scr uptrending, pt is in [facility]AT[facility].   Discontinue the scheduled vancomycin regimen, start pulse dosing.  Give 1X 2000mg today.   Re-dose when the random level is less than 20 mcg/mL, next level to be drawn at 0300 on 5/12.    Drug levels (last 3 results):  Recent Labs   Lab Result Units 05/09/22  0824 05/10/22  1407   Vancomycin-Trough ug/mL 22.5* 26.8*       Pharmacy will continue to follow and monitor vancomycin.    Please contact pharmacy at extension [phone] for questions regarding this assessment.    Thank you for the consult,   Mainor Coats       Patient brief summary:  Vicky Alvarado is a 57 y.o. female initiated on antimicrobial therapy with IV Vancomycin for treatment of skin & soft tissue infection    The patient's current regimen is 1750mg IV every 12 hours.     Drug Allergies:   Review of patient's allergies indicates:   Allergen Reactions    Flecainide Shortness Of Breath and Swelling       Actual Body Weight:   125.7kg    Renal Function:   Estimated Creatinine Clearance: 64 mL/min (based on SCr of 1.3 mg/dL).,     Dialysis Method (if applicable):  N/A    CBC (last 72 hours):  Recent Labs   Lab Result Units 05/08/22  0430 05/09/22  0651 05/10/22  0504   WBC K/uL 5.90 6.16 5.12   Hemoglobin g/dL 8.1* 8.5* 8.0*   Hematocrit % 26.6* 29.5* 25.7*   Platelets K/uL 140* 123* 153   Gran % % 66.2 65.2 60.2   Lymph % % 17.8* 18.8 21.1   Mono % % 9.8 9.1 10.7   Eosinophil % % 5.1 6.0 6.4   Basophil % % 0.8 0.6 0.8   Differential Method  Automated Automated Automated       Metabolic Panel (last 72 hours):  Recent Labs   Lab Result Units 05/08/22  0430 05/09/22  0651 05/10/22  0504   Sodium  mmol/L 130* 132* 133*   Potassium mmol/L 3.8 3.7 3.5   Chloride mmol/L 98 97 98   CO2 mmol/L 23 24 25   Glucose mg/dL 156* 133* 157*   BUN mg/dL 10 11 11   Creatinine mg/dL 0.9 1.0 1.3   Albumin g/dL 2.6* 2.9* 2.6*   Total Bilirubin mg/dL 0.8 0.9 0.6   Alkaline Phosphatase U/L 84 105 95   AST U/L 24 50* 43*   ALT U/L 13 21 19   Magnesium mg/dL 1.3* 1.7 1.7   Phosphorus mg/dL 2.6*  --   --        Vancomycin Administrations:  vancomycin given in the last 96 hours                   vancomycin 1.75 g in 5 % dextrose 500 mL IVPB (mg) 1,750 mg New Bag 05/10/22 0341     1,750 mg New Bag 05/09/22 1502     1,750 mg New Bag  0305    vancomycin 1.75 g in 5 % dextrose 500 mL IVPB (mg) 1,750 mg New Bag 05/08/22 0239    vancomycin 2 g in dextrose 5 % 500 mL IVPB (mg) 2,000 mg New Bag 05/07/22 1502                Microbiologic Results:  Microbiology Results (last 7 days)     Procedure Component Value Units Date/Time    Blood Culture #1 **CANNOT BE ORDERED STAT** [[id number]] Collected: 05/07/22 1246    Order Status: Completed Specimen: Blood from Peripheral, Antecubital, Left Updated: 05/10/22 1412     Blood Culture, Routine No Growth to date      No Growth to date      No Growth to date      No Growth to date    Blood Culture #2 **CANNOT BE ORDERED STAT** [[id number]] Collected: 05/07/22 1258    Order Status: Completed Specimen: Blood from Peripheral, Antecubital, Right Updated: 05/10/22 1412     Blood Culture, Routine No Growth to date      No Growth to date      No Growth to date      No Growth to date

## 2022-05-10 NOTE — ASSESSMENT & PLAN NOTE
57F with left knee and leg cellulitis, and likely left prepatellar bursitis. Resolving.     Recs  -No acute orthopedic surgical intervention at this time  -IV abx per hospital medicine  -WBAT LLE  - Continue IVAB plan for likely dc tomorrow with oral bactrim. Needs aggressive diuresis as pedal edema this may have played a role in her getting cellulitis.

## 2022-05-10 NOTE — SUBJECTIVE & OBJECTIVE
NAEON. Denies SOB or CP. Eating okay. UOP wnl. BM wnl.   Overall pain and swelling improving, knee still with pain but less so.      Review of Systems   Constitutional:  Positive for activity change and fever.   HENT:  Negative for sore throat and trouble swallowing.    Eyes:  Negative for photophobia and visual disturbance.   Respiratory:  Negative for chest tightness and shortness of breath.    Cardiovascular:  Negative for chest pain, palpitations and leg swelling.   Gastrointestinal:  Negative for abdominal distention, abdominal pain, blood in stool, constipation, diarrhea, nausea and vomiting.   Endocrine: Negative for polyphagia and polyuria.   Genitourinary:  Negative for difficulty urinating, dysuria and hematuria.   Musculoskeletal:  Positive for joint swelling.   Skin:  Positive for color change.   Allergic/Immunologic: Negative for immunocompromised state.   Neurological:  Negative for dizziness, seizures, syncope and facial asymmetry.   Psychiatric/Behavioral:  Negative for agitation, behavioral problems and confusion.    Objective:     Vital Signs (Most Recent):  Temp: 97.7 °F (36.5 °C) (05/10/22 1128)  Pulse: 80 (05/10/22 1128)  Resp: 16 (05/10/22 1252)  BP: 114/68 (05/10/22 1128)  SpO2: 97 % (05/10/22 1128)   Vital Signs (24h Range):  Temp:  [97.7 °F (36.5 °C)-98.1 °F (36.7 °C)] 97.7 °F (36.5 °C)  Pulse:  [72-82] 80  Resp:  [16-20] 16  SpO2:  [94 %-100 %] 97 %  BP: ()/(48-68) 114/68     Weight: 125.7 kg (277 lb 1.9 oz)  Body mass index is 44.73 kg/m².    Physical Exam  Vitals and nursing note reviewed.   Constitutional:       Appearance: She is well-developed. She is obese. She is ill-appearing. She is not toxic-appearing or diaphoretic.   HENT:      Head: Normocephalic and atraumatic.      Nose: Nose normal. No congestion.   Eyes:      General: No scleral icterus.     Pupils: Pupils are equal, round, and reactive to light.   Neck:      Thyroid: No thyromegaly.   Cardiovascular:      Rate and  Rhythm: Normal rate and regular rhythm.      Heart sounds: No murmur heard.    No gallop.   Pulmonary:      Effort: Pulmonary effort is normal.      Breath sounds: Normal breath sounds. No stridor. No wheezing or rales.   Abdominal:      General: There is no distension.      Palpations: Abdomen is soft. There is no mass.      Tenderness: There is no abdominal tenderness. There is no guarding.   Musculoskeletal:         General: Swelling and tenderness present. No signs of injury. Normal range of motion.      Cervical back: Normal range of motion and neck supple. No rigidity.      Right lower leg: No edema.      Left lower leg: Edema present.   Lymphadenopathy:      Cervical: No cervical adenopathy.   Skin:     General: Skin is warm and dry.      Capillary Refill: Capillary refill takes less than 2 seconds.      Findings: Erythema present.   Neurological:      General: No focal deficit present.      Mental Status: She is alert and oriented to person, place, and time.      Cranial Nerves: No cranial nerve deficit.      Motor: No weakness.   Psychiatric:         Mood and Affect: Mood normal.         Behavior: Behavior normal.         CRANIAL NERVES     CN III, IV, VI   Pupils are equal, round, and reactive to light.     +2 PE on RLE and +1 PE on LLE    Recent Results (from the past 24 hour(s))   CBC Auto Differential    Collection Time: 05/10/22  5:04 AM   Result Value Ref Range    WBC 5.12 3.90 - 12.70 K/uL    RBC 2.92 (L) 4.00 - 5.40 M/uL    Hemoglobin 8.0 (L) 12.0 - 16.0 g/dL    Hematocrit 25.7 (L) 37.0 - 48.5 %    MCV 88 82 - 98 fL    MCH 27.4 27.0 - 31.0 pg    MCHC 31.1 (L) 32.0 - 36.0 g/dL    RDW 16.9 (H) 11.5 - 14.5 %    Platelets 153 150 - 450 K/uL    MPV 11.0 9.2 - 12.9 fL    Immature Granulocytes 0.8 (H) 0.0 - 0.5 %    Gran # (ANC) 3.1 1.8 - 7.7 K/uL    Immature Grans (Abs) 0.04 0.00 - 0.04 K/uL    Lymph # 1.1 1.0 - 4.8 K/uL    Mono # 0.6 0.3 - 1.0 K/uL    Eos # 0.3 0.0 - 0.5 K/uL    Baso # 0.04 0.00 - 0.20  K/uL    nRBC 0 0 /100 WBC    Gran % 60.2 38.0 - 73.0 %    Lymph % 21.1 18.0 - 48.0 %    Mono % 10.7 4.0 - 15.0 %    Eosinophil % 6.4 0.0 - 8.0 %    Basophil % 0.8 0.0 - 1.9 %    Differential Method Automated    Comprehensive Metabolic Panel    Collection Time: 05/10/22  5:04 AM   Result Value Ref Range    Sodium 133 (L) 136 - 145 mmol/L    Potassium 3.5 3.5 - 5.1 mmol/L    Chloride 98 95 - 110 mmol/L    CO2 25 23 - 29 mmol/L    Glucose 157 (H) 70 - 110 mg/dL    BUN 11 6 - 20 mg/dL    Creatinine 1.3 0.5 - 1.4 mg/dL    Calcium 8.5 (L) 8.7 - 10.5 mg/dL    Total Protein 5.7 (L) 6.0 - 8.4 g/dL    Albumin 2.6 (L) 3.5 - 5.2 g/dL    Total Bilirubin 0.6 0.1 - 1.0 mg/dL    Alkaline Phosphatase 95 55 - 135 U/L    AST 43 (H) 10 - 40 U/L    ALT 19 10 - 44 U/L    Anion Gap 10 8 - 16 mmol/L    eGFR if African American 52.3 (A) >60 mL/min/1.73 m^2    eGFR if non  45.3 (A) >60 mL/min/1.73 m^2   Magnesium    Collection Time: 05/10/22  5:04 AM   Result Value Ref Range    Magnesium 1.7 1.6 - 2.6 mg/dL   VANCOMYCIN, TROUGH    Collection Time: 05/10/22  2:07 PM   Result Value Ref Range    Vancomycin-Trough 26.8 (H) 10.0 - 22.0 ug/mL       Microbiology Results (last 7 days)       Procedure Component Value Units Date/Time    Blood Culture #1 **CANNOT BE ORDERED STAT** [[id number]] Collected: 05/07/22 1246    Order Status: Completed Specimen: Blood from Peripheral, Antecubital, Left Updated: 05/10/22 1412     Blood Culture, Routine No Growth to date      No Growth to date      No Growth to date      No Growth to date    Blood Culture #2 **CANNOT BE ORDERED STAT** [[id number]] Collected: 05/07/22 1258    Order Status: Completed Specimen: Blood from Peripheral, Antecubital, Right Updated: 05/10/22 1412     Blood Culture, Routine No Growth to date      No Growth to date      No Growth to date      No Growth to date             Imaging Results              X-Ray Knee 3 View Left (Final result)  Result time 05/08/22 01:13:57       Final result by Timur Sanabria MD (05/08/22 01:13:57)                   Impression:      As above.      Electronically signed by: Timur Sanabria MD  Date:    05/08/2022  Time:    01:13               Narrative:    EXAMINATION:  XR KNEE 3 VIEW LEFT    CLINICAL HISTORY:  postop;    TECHNIQUE:  AP, lateral, and Merchant views of the left knee were performed.    COMPARISON:  05/07/2022 left tibia and fibula.    FINDINGS:  Suboptimal positioning on the AP view with the knee appears to be in flexion.  Joint space is not adequately assessed.    No displaced fracture or dislocation.  Suboptimal positioning on AP view results in limited assessment of the tibiofemoral joint space and tibial plateau.  Suprapatellar effusion present.  Diffuse soft tissue edema present.                                        CT Thigh With Contrast Left (Final result)  Result time 05/07/22 19:04:28      Final result by Dre Real MD (05/07/22 19:04:28)                   Impression:      Findings concerning for left lower extremity cellulitis.    Knee joint effusion with some peripheral enhancement which could represent sterile effusion and nonspecific reactive synovitis; however, septic arthritis not excluded.  Additionally, there is a prepatellar small rim enhancing collection favored to represent nonspecific bursitis, although small abscess not excluded in the setting of surrounding cellulitis.  Clinical correlation advised.  Fluid sampling may be warranted.    Mild degenerative changes of the lower extremity as detailed above.    3.8 cm incidental left adnexal cyst.  For a patient of the this age group pelvic ultrasound follow-up in 6-12 months is recommended per ACR guidelines, or sooner if associated pain develops.    Suspected leiomyomatous uterus.  Further evaluation with elective/nonemergent pelvic ultrasound can be obtained as warranted.    Additional findings above.    This report was flagged in Epic as  abnormal.    Electronically signed by resident: Ramon Hammonds  Date:    05/07/2022  Time:    18:26    Electronically signed by: Dre Real MD  Date:    05/07/2022  Time:    19:04               Narrative:    EXAMINATION:  CT THIGH WITH CONTRAST LEFT; CT LEG (TIBIA-FIBULA) WITH CONTRAST LEFT    CLINICAL HISTORY:  Soft tissue infection suspected, thigh, xray done;; Soft tissue infection suspected, lower leg, xray done;    TECHNIQUE:  Axial images performed through the iliac crests to the foot of the left lower extremity after administration of 100 mL of intravenous Omnipaque 350.  Coronal and sagittal reformations performed.    COMPARISON:  Lower extremity veins ultrasound 05/07/2022    FINDINGS:  The bones of the left lower extremity demonstrate mild degenerative changes of the left knee indicated by subchondral sclerosis, osteophytic formation, and mild joint space narrowing.  Degenerative changes of the 1st metatarsophalangeal joint is noted as well.  No acute fracture.  No osseous destructive lesions.  Small suprapatellar and femorotibial joint effusions with some peripheral enhancement.    The soft tissues of the left lower extremity demonstrate multiple areas of soft tissue attenuation with peripheral calcification in the subcutaneous soft tissues overlying the lateral left thigh, perhaps relating to remote trauma or injection medication use.  Left inguinal skin thickening, likely postsurgical related to prior left superficial femoral artery branch laceration repair.  There is left inguinal lymphadenopathy, for example lymph node measuring 1.2 cm in short axis (series 301, image 845). Septation of the subcutaneous fat involving the left lower extremity mainly in the lower leg, indicating edema.  Mild skin thickening overlying the left lateral lower leg.  There is approximate 2.8 x 1.2 x 2.8 cm slightly heterogeneous collection within enhancing rim along the anterior mid to lower aspect of the  patella.    Limited intrapelvic evaluation demonstrates enlargement of the uterus with punctate internal calcifications, likely leiomyomatous uterus.  3.8 cm left adnexal hypodensity most consistent with a cyst.                                        CT Leg (Tibia-Fibula) Wtih Contrast Left (Final result)  Result time 05/07/22 19:04:28      Final result by Dre Real MD (05/07/22 19:04:28)                   Impression:      Findings concerning for left lower extremity cellulitis.    Knee joint effusion with some peripheral enhancement which could represent sterile effusion and nonspecific reactive synovitis; however, septic arthritis not excluded.  Additionally, there is a prepatellar small rim enhancing collection favored to represent nonspecific bursitis, although small abscess not excluded in the setting of surrounding cellulitis.  Clinical correlation advised.  Fluid sampling may be warranted.    Mild degenerative changes of the lower extremity as detailed above.    3.8 cm incidental left adnexal cyst.  For a patient of the this age group pelvic ultrasound follow-up in 6-12 months is recommended per ACR guidelines, or sooner if associated pain develops.    Suspected leiomyomatous uterus.  Further evaluation with elective/nonemergent pelvic ultrasound can be obtained as warranted.    Additional findings above.    This report was flagged in Epic as abnormal.    Electronically signed by resident: Ramon Hammonds  Date:    05/07/2022  Time:    18:26    Electronically signed by: Dre Real MD  Date:    05/07/2022  Time:    19:04               Narrative:    EXAMINATION:  CT THIGH WITH CONTRAST LEFT; CT LEG (TIBIA-FIBULA) WITH CONTRAST LEFT    CLINICAL HISTORY:  Soft tissue infection suspected, thigh, xray done;; Soft tissue infection suspected, lower leg, xray done;    TECHNIQUE:  Axial images performed through the iliac crests to the foot of the left lower extremity after administration of 100 mL of  intravenous Omnipaque 350.  Coronal and sagittal reformations performed.    COMPARISON:  Lower extremity veins ultrasound 05/07/2022    FINDINGS:  The bones of the left lower extremity demonstrate mild degenerative changes of the left knee indicated by subchondral sclerosis, osteophytic formation, and mild joint space narrowing.  Degenerative changes of the 1st metatarsophalangeal joint is noted as well.  No acute fracture.  No osseous destructive lesions.  Small suprapatellar and femorotibial joint effusions with some peripheral enhancement.    The soft tissues of the left lower extremity demonstrate multiple areas of soft tissue attenuation with peripheral calcification in the subcutaneous soft tissues overlying the lateral left thigh, perhaps relating to remote trauma or injection medication use.  Left inguinal skin thickening, likely postsurgical related to prior left superficial femoral artery branch laceration repair.  There is left inguinal lymphadenopathy, for example lymph node measuring 1.2 cm in short axis (series 301, image 845). Septation of the subcutaneous fat involving the left lower extremity mainly in the lower leg, indicating edema.  Mild skin thickening overlying the left lateral lower leg.  There is approximate 2.8 x 1.2 x 2.8 cm slightly heterogeneous collection within enhancing rim along the anterior mid to lower aspect of the patella.    Limited intrapelvic evaluation demonstrates enlargement of the uterus with punctate internal calcifications, likely leiomyomatous uterus.  3.8 cm left adnexal hypodensity most consistent with a cyst.                                       US Lower Extremity Veins Left (Final result)  Result time 05/07/22 14:23:00      Final result by Dre Real MD (05/07/22 14:23:00)                   Impression:      No evidence of deep venous thrombosis in the left lower extremity.      Electronically signed by: Dre Real MD  Date:    05/07/2022  Time:    14:23                Narrative:    EXAMINATION:  US LOWER EXTREMITY VEINS LEFT    CLINICAL HISTORY:  Other specified soft tissue disorders    TECHNIQUE:  Duplex and color flow Doppler evaluation and graded compression of the left lower extremity veins was performed.    COMPARISON:  None    FINDINGS:  Left thigh veins: The common femoral, femoral, popliteal, upper greater saphenous, and deep femoral veins are patent and free of thrombus. The veins are normally compressible and have normal phasic flow and augmentation response.    Left calf veins: The visualized calf veins are patent.    Contralateral CFV: The contralateral (right) common femoral vein is patent and free of thrombus.    Miscellaneous: Nonspecific subcutaneous edema at the left calf region.                                       X-Ray Tibia Fibula 2 View Left (Final result)  Result time 05/07/22 13:57:38      Final result by Joshua Fritz MD (05/07/22 13:57:38)                   Impression:      As above.      Electronically signed by: Joshua Fritz  Date:    05/07/2022  Time:    13:57               Narrative:    EXAMINATION:  XR TIBIA FIBULA 2 VIEW LEFT    CLINICAL HISTORY:  Pain in leg, unspecified    TECHNIQUE:  AP and lateral views of the left tibia and fibula were performed.    COMPARISON:  None.    FINDINGS:  No acute fracture, dislocation or osseous destruction.  No radiopaque foreign body.  Large calcaneal enthesophyte at the distal Achilles insertion.  Partial visualized femorotibial degenerative change.

## 2022-05-10 NOTE — PLAN OF CARE
No distress overnight, minimal sleep, vitals stable. Pain controlled with iv dilaudid and oxycodone. Iv abx infusing as ordered.  Redness and swelling better this morning. Diuresing well with lasix.  Needs addressed. Call bell and personal items within reach.    Problem: Adult Inpatient Plan of Care  Goal: Optimal Comfort and Wellbeing  5/10/2022 0726 by Nola Wall RN  Outcome: Ongoing, Progressing     Problem: Skin or Soft Tissue Infection  Goal: Absence of Infection Signs and Symptoms  Outcome: Ongoing, Progressing     Problem: Pain Acute  Goal: Acceptable Pain Control and Functional Ability  Outcome: Ongoing, Progressing

## 2022-05-10 NOTE — PROGRESS NOTES
Pharmacokinetic Assessment Follow Up: IV Vancomycin    Vancomycin serum concentration assessment(s):    The random level was drawn correctly and can be used to guide therapy at this time. The measurement is above the desired definitive target range of 10 to 15 mcg/mL.    Vancomycin Regimen Plan:    Discontinue the scheduled vancomycin regimen and re-dose when the random level is less than 15 mcg/mL, next level to be drawn with am labs on 5/11/2022.     Due to JHONATAN, largely supratherapeutic level, and Scr trend upward, I wanted to ensure level came down appropriately before administering next dose.    Drug levels (last 3 results):  Recent Labs   Lab Result Units 05/09/22  0824 05/10/22  1407   Vancomycin-Trough ug/mL 22.5* 26.8*       Pharmacy will continue to follow and monitor vancomycin.    Please contact pharmacy at extension [phone] for questions regarding this assessment.    Thank you for the consult,   Armen Mayen, Pharm.D.  Inpatient Pharmacist     Patient brief summary:  Vicky Alvarado is a 57 y.o. female initiated on antimicrobial therapy with IV Vancomycin for treatment of skin & soft tissue infection    The patient's current regimen is pulse dosing     Drug Allergies:   Review of patient's allergies indicates:   Allergen Reactions    Flecainide Shortness Of Breath and Swelling       Actual Body Weight:   125.7 kg  BMI: 44.73    Renal Function:   Estimated Creatinine Clearance: 64 mL/min (based on SCr of 1.3 mg/dL).,     Dialysis Method (if applicable):  N/A    CBC (last 72 hours):  Recent Labs   Lab Result Units 05/08/22  0430 05/09/22  0651 05/10/22  0504   WBC K/uL 5.90 6.16 5.12   Hemoglobin g/dL 8.1* 8.5* 8.0*   Hematocrit % 26.6* 29.5* 25.7*   Platelets K/uL 140* 123* 153   Gran % % 66.2 65.2 60.2   Lymph % % 17.8* 18.8 21.1   Mono % % 9.8 9.1 10.7   Eosinophil % % 5.1 6.0 6.4   Basophil % % 0.8 0.6 0.8   Differential Method  Automated Automated Automated       Metabolic Panel (last 72  hours):  Recent Labs   Lab Result Units 05/08/22  0430 05/09/22  0651 05/10/22  0504   Sodium mmol/L 130* 132* 133*   Potassium mmol/L 3.8 3.7 3.5   Chloride mmol/L 98 97 98   CO2 mmol/L 23 24 25   Glucose mg/dL 156* 133* 157*   BUN mg/dL 10 11 11   Creatinine mg/dL 0.9 1.0 1.3   Albumin g/dL 2.6* 2.9* 2.6*   Total Bilirubin mg/dL 0.8 0.9 0.6   Alkaline Phosphatase U/L 84 105 95   AST U/L 24 50* 43*   ALT U/L 13 21 19   Magnesium mg/dL 1.3* 1.7 1.7   Phosphorus mg/dL 2.6*  --   --        Vancomycin Administrations:  vancomycin given in the last 96 hours                   vancomycin 1.75 g in 5 % dextrose 500 mL IVPB (mg) 1,750 mg New Bag 05/10/22 0341     1,750 mg New Bag 05/09/22 1502     1,750 mg New Bag  0305    vancomycin 1.75 g in 5 % dextrose 500 mL IVPB (mg) 1,750 mg New Bag 05/08/22 0239    vancomycin 2 g in dextrose 5 % 500 mL IVPB (mg) 2,000 mg New Bag 05/07/22 1502                Microbiologic Results:  Microbiology Results (last 7 days)     Procedure Component Value Units Date/Time    Blood Culture #1 **CANNOT BE ORDERED STAT** [[id number]] Collected: 05/07/22 1246    Order Status: Completed Specimen: Blood from Peripheral, Antecubital, Left Updated: 05/10/22 1412     Blood Culture, Routine No Growth to date      No Growth to date      No Growth to date      No Growth to date    Blood Culture #2 **CANNOT BE ORDERED STAT** [[id number]] Collected: 05/07/22 1258    Order Status: Completed Specimen: Blood from Peripheral, Antecubital, Right Updated: 05/10/22 1412     Blood Culture, Routine No Growth to date      No Growth to date      No Growth to date      No Growth to date

## 2022-05-10 NOTE — PLAN OF CARE
Patient aaox4 sitting in bed. No acute resp distress noted. Patient given xanax as requested for anxiety. Patient denies other complaints at this time. Plan of care reviewed with patient. Patient denies needs at this time. Bed in low position. Call light within reach.     Problem: Adult Inpatient Plan of Care  Goal: Plan of Care Review  5/9/2022 1914 by Rosemarie Zamora RN  Outcome: Ongoing, Progressing  5/9/2022 1826 by Rosemarie Zamora RN  Outcome: Ongoing, Progressing  Goal: Patient-Specific Goal (Individualized)  5/9/2022 1914 by Rosemarie Zamora RN  Outcome: Ongoing, Progressing  5/9/2022 1826 by Rosemarie Zamora RN  Outcome: Ongoing, Progressing  Goal: Absence of Hospital-Acquired Illness or Injury  5/9/2022 1914 by Rosemarie Zamora RN  Outcome: Ongoing, Progressing  5/9/2022 1826 by Rosemarie Zamora RN  Outcome: Ongoing, Progressing  Goal: Optimal Comfort and Wellbeing  5/9/2022 1914 by Rosemarie Zamora RN  Outcome: Ongoing, Progressing  5/9/2022 1826 by Rosemarie Zamora RN  Outcome: Ongoing, Progressing  Goal: Readiness for Transition of Care  5/9/2022 1914 by Rosemarie Zamora RN  Outcome: Ongoing, Progressing  5/9/2022 1826 by Rosemarie Zamora RN  Outcome: Ongoing, Progressing     Problem: Bariatric Environmental Safety  Goal: Safety Maintained with Care  Outcome: Ongoing, Progressing     Problem: Diabetes Comorbidity  Goal: Blood Glucose Level Within Targeted Range  Outcome: Ongoing, Progressing     Problem: Fall Injury Risk  Goal: Absence of Fall and Fall-Related Injury  Outcome: Ongoing, Progressing

## 2022-05-10 NOTE — SUBJECTIVE & OBJECTIVE
Principal Problem:Left leg cellulitis    Principal Orthopedic Problem:  Prepatellar septic bursitis.    Interval History: Patient seen and examined at bedside.  Cellulitis has completely resolved except directly around the area of prepatellar bursitis. Significant pedal edema bilaterally.      Review of patient's allergies indicates:   Allergen Reactions    Flecainide Shortness Of Breath and Swelling       Current Facility-Administered Medications   Medication    acetaminophen tablet 650 mg    ALPRAZolam tablet 0.5 mg    apixaban tablet 5 mg    atorvastatin tablet 40 mg    celecoxib capsule 100 mg    diphenhydrAMINE capsule 25 mg    DULoxetine DR capsule 120 mg    furosemide injection 40 mg    glucagon (human recombinant) injection 1 mg    glucose chewable tablet 16 g    glucose chewable tablet 24 g    HYDROmorphone injection 0.5 mg    medroxyPROGESTERone tablet 20 mg    melatonin tablet 6 mg    metoprolol succinate (TOPROL-XL) 24 hr split tablet 12.5 mg    miconazole NITRATE 2 % top powder    multivitamin tablet    naloxone 0.4 mg/mL injection 0.02 mg    NIFEdipine 24 hr tablet 30 mg    ondansetron disintegrating tablet 8 mg    oxyCODONE immediate release tablet 5 mg    pantoprazole EC tablet 40 mg    piperacillin-tazobactam 4.5 g in sodium chloride 0.9% 100 mL IVPB (ready to mix system)    polyethylene glycol packet 17 g    propafenone tablet 225 mg    traZODone tablet 50 mg    vancomycin - pharmacy to dose    vancomycin 1.75 g in 5 % dextrose 500 mL IVPB     Facility-Administered Medications Ordered in Other Encounters   Medication    sodium chloride 0.9% bolus 1,000 mL     Objective:     Vital Signs (Most Recent):  Temp: 97.7 °F (36.5 °C) (05/10/22 0509)  Pulse: 82 (05/10/22 0509)  Resp: 18 (05/10/22 0509)  BP: (!) 117/54 (05/10/22 0509)  SpO2: 98 % (05/10/22 0509)   Vital Signs (24h Range):  Temp:  [97.7 °F (36.5 °C)-98.4 °F (36.9 °C)] 97.7 °F (36.5 °C)  Pulse:  [69-82] 82  Resp:  [15-20] 18  SpO2:  [94 %-100  "%] 98 %  BP: ()/(48-58) 117/54     Weight: 125.7 kg (277 lb 1.9 oz)  Height: 5' 6" (167.6 cm)  Body mass index is 44.73 kg/m².      Intake/Output Summary (Last 24 hours) at 5/10/2022 0648  Last data filed at 5/10/2022 0000  Gross per 24 hour   Intake --   Output 900 ml   Net -900 ml       Ortho/SPM Exam  Physical Exam:  NAD, A/O x 3.  She large amount of erythema around the knee that has visibly resolved.  She does have a small amount of fluid noted anterior aspect of the knee into some surrounding erythema.  Area proximally 3 x 3 cm.  Full passive, active range of motion of the knee without pain.  Is able to bear weight.      Significant Labs: All pertinent labs within the past 24 hours have been reviewed.    Significant Imaging: I have reviewed and interpreted all pertinent imaging results/findings.  "

## 2022-05-10 NOTE — PROGRESS NOTES
[facility] - Telemetry Stepdown ([address] Medicine  Progress Note    Patient Name: Vicky Alvarado  MRN: [id number]  Patient Class: IP- Inpatient   Admission Date: 5/7/2022  Length of Stay: 1 days  Attending Physician: Reed Zuleta MD  Primary Care Provider: Gordy Cordero MD        Subjective:     Principal Problem:Left leg cellulitis        HPI:    Vicky Alvarado is a 57 y.o. female who has a past medical history of Anticoagulant long-term use, Arthritis, Atrial fibrillation, vascular necrosis, CHF, Depression, GERD, psychiatric care, Hypertension, Iron deficiency anemia, Obese, now Pre-diabetes, Psychiatric problem, and Sleep apnea, presented to the ED with Erythema and Pain to her left leg. Which started about 2 days ago. Patient had 101F yesterday. She attempted urgent care but they were unable to see her. On presentation today, her leg is erythematous and tender to touch. She denies any injury. 3/4 SIRS with 101F, , RR 22, with normal WBC#. She has lactic acidosis of 3.8 and . CT showed knee joint effusion with some peripheral enhancement which could represent sterile effusion and nonspecific reactive synovitis; however, septic arthritis not excluded, additionally, there is a prepatellar small rim enhancing collection favored to represent nonspecific bursitis, although small abscess not excluded in the setting of surrounding cellulitis of LLE.     Incidental/new findings: 3.8 cm incidental left adnexal cyst, suspected leiomyomatous uterus, iron deficient.      Overview/Hospital Course:  Admitted with Cellulitis and LA of 3.8, , . Erythema, Swelling and tenderness reducing, except her knee which she states hurts worse today. Continuing IV abx, concern about going through cellulitis to bursa and may seed it (if not primary cause), will discuss with ortho. Erythema resolving. Still with pain and a bit of redness around knee, ortho will check tomorrow to make  sure still improving. Still +2 PE on RLE and +1 PE on LLE, will continue lasix, reduce BP meds for now. ERythema almost gone today, except portion overlying knee, will monitor one more day, appreciate ortho following. Increasing to IV 80 mg BID with quite a bit of fluid remaining.         NAEON. Denies SOB or CP. Eating okay. UOP wnl. BM wnl.   Overall pain and swelling improving, knee still with pain but less so.      Review of Systems   Constitutional:  Positive for activity change and fever.   HENT:  Negative for sore throat and trouble swallowing.    Eyes:  Negative for photophobia and visual disturbance.   Respiratory:  Negative for chest tightness and shortness of breath.    Cardiovascular:  Negative for chest pain, palpitations and leg swelling.   Gastrointestinal:  Negative for abdominal distention, abdominal pain, blood in stool, constipation, diarrhea, nausea and vomiting.   Endocrine: Negative for polyphagia and polyuria.   Genitourinary:  Negative for difficulty urinating, dysuria and hematuria.   Musculoskeletal:  Positive for joint swelling.   Skin:  Positive for color change.   Allergic/Immunologic: Negative for immunocompromised state.   Neurological:  Negative for dizziness, seizures, syncope and facial asymmetry.   Psychiatric/Behavioral:  Negative for agitation, behavioral problems and confusion.    Objective:     Vital Signs (Most Recent):  Temp: 97.7 °F (36.5 °C) (05/10/22 1128)  Pulse: 80 (05/10/22 1128)  Resp: 16 (05/10/22 1252)  BP: 114/68 (05/10/22 1128)  SpO2: 97 % (05/10/22 1128)   Vital Signs (24h Range):  Temp:  [97.7 °F (36.5 °C)-98.1 °F (36.7 °C)] 97.7 °F (36.5 °C)  Pulse:  [72-82] 80  Resp:  [16-20] 16  SpO2:  [94 %-100 %] 97 %  BP: ()/(48-68) 114/68     Weight: 125.7 kg (277 lb 1.9 oz)  Body mass index is 44.73 kg/m².    Physical Exam  Vitals and nursing note reviewed.   Constitutional:       Appearance: She is well-developed. She is obese. She is ill-appearing. She is not  toxic-appearing or diaphoretic.   HENT:      Head: Normocephalic and atraumatic.      Nose: Nose normal. No congestion.   Eyes:      General: No scleral icterus.     Pupils: Pupils are equal, round, and reactive to light.   Neck:      Thyroid: No thyromegaly.   Cardiovascular:      Rate and Rhythm: Normal rate and regular rhythm.      Heart sounds: No murmur heard.    No gallop.   Pulmonary:      Effort: Pulmonary effort is normal.      Breath sounds: Normal breath sounds. No stridor. No wheezing or rales.   Abdominal:      General: There is no distension.      Palpations: Abdomen is soft. There is no mass.      Tenderness: There is no abdominal tenderness. There is no guarding.   Musculoskeletal:         General: Swelling and tenderness present. No signs of injury. Normal range of motion.      Cervical back: Normal range of motion and neck supple. No rigidity.      Right lower leg: No edema.      Left lower leg: Edema present.   Lymphadenopathy:      Cervical: No cervical adenopathy.   Skin:     General: Skin is warm and dry.      Capillary Refill: Capillary refill takes less than 2 seconds.      Findings: Erythema present.   Neurological:      General: No focal deficit present.      Mental Status: She is alert and oriented to person, place, and time.      Cranial Nerves: No cranial nerve deficit.      Motor: No weakness.   Psychiatric:         Mood and Affect: Mood normal.         Behavior: Behavior normal.         CRANIAL NERVES     CN III, IV, VI   Pupils are equal, round, and reactive to light.     +2 PE on RLE and +1 PE on LLE    Recent Results (from the past 24 hour(s))   CBC Auto Differential    Collection Time: 05/10/22  5:04 AM   Result Value Ref Range    WBC 5.12 3.90 - 12.70 K/uL    RBC 2.92 (L) 4.00 - 5.40 M/uL    Hemoglobin 8.0 (L) 12.0 - 16.0 g/dL    Hematocrit 25.7 (L) 37.0 - 48.5 %    MCV 88 82 - 98 fL    MCH 27.4 27.0 - 31.0 pg    MCHC 31.1 (L) 32.0 - 36.0 g/dL    RDW 16.9 (H) 11.5 - 14.5 %     Platelets 153 150 - 450 K/uL    MPV 11.0 9.2 - 12.9 fL    Immature Granulocytes 0.8 (H) 0.0 - 0.5 %    Gran # (ANC) 3.1 1.8 - 7.7 K/uL    Immature Grans (Abs) 0.04 0.00 - 0.04 K/uL    Lymph # 1.1 1.0 - 4.8 K/uL    Mono # 0.6 0.3 - 1.0 K/uL    Eos # 0.3 0.0 - 0.5 K/uL    Baso # 0.04 0.00 - 0.20 K/uL    nRBC 0 0 /100 WBC    Gran % 60.2 38.0 - 73.0 %    Lymph % 21.1 18.0 - 48.0 %    Mono % 10.7 4.0 - 15.0 %    Eosinophil % 6.4 0.0 - 8.0 %    Basophil % 0.8 0.0 - 1.9 %    Differential Method Automated    Comprehensive Metabolic Panel    Collection Time: 05/10/22  5:04 AM   Result Value Ref Range    Sodium 133 (L) 136 - 145 mmol/L    Potassium 3.5 3.5 - 5.1 mmol/L    Chloride 98 95 - 110 mmol/L    CO2 25 23 - 29 mmol/L    Glucose 157 (H) 70 - 110 mg/dL    BUN 11 6 - 20 mg/dL    Creatinine 1.3 0.5 - 1.4 mg/dL    Calcium 8.5 (L) 8.7 - 10.5 mg/dL    Total Protein 5.7 (L) 6.0 - 8.4 g/dL    Albumin 2.6 (L) 3.5 - 5.2 g/dL    Total Bilirubin 0.6 0.1 - 1.0 mg/dL    Alkaline Phosphatase 95 55 - 135 U/L    AST 43 (H) 10 - 40 U/L    ALT 19 10 - 44 U/L    Anion Gap 10 8 - 16 mmol/L    eGFR if African American 52.3 (A) >60 mL/min/1.73 m^2    eGFR if non  45.3 (A) >60 mL/min/1.73 m^2   Magnesium    Collection Time: 05/10/22  5:04 AM   Result Value Ref Range    Magnesium 1.7 1.6 - 2.6 mg/dL   VANCOMYCIN, TROUGH    Collection Time: 05/10/22  2:07 PM   Result Value Ref Range    Vancomycin-Trough 26.8 (H) 10.0 - 22.0 ug/mL       Microbiology Results (last 7 days)       Procedure Component Value Units Date/Time    Blood Culture #1 **CANNOT BE ORDERED STAT** [[id number]] Collected: 05/07/22 1246    Order Status: Completed Specimen: Blood from Peripheral, Antecubital, Left Updated: 05/10/22 1412     Blood Culture, Routine No Growth to date      No Growth to date      No Growth to date      No Growth to date    Blood Culture #2 **CANNOT BE ORDERED STAT** [[id number]] Collected: 05/07/22 1258    Order Status: Completed Specimen:  Blood from Peripheral, Antecubital, Right Updated: 05/10/22 1412     Blood Culture, Routine No Growth to date      No Growth to date      No Growth to date      No Growth to date             Imaging Results              X-Ray Knee 3 View Left (Final result)  Result time 05/08/22 01:13:57      Final result by Timur Sanabria MD (05/08/22 01:13:57)                   Impression:      As above.      Electronically signed by: Timur Sanabria MD  Date:    05/08/2022  Time:    01:13               Narrative:    EXAMINATION:  XR KNEE 3 VIEW LEFT    CLINICAL HISTORY:  postop;    TECHNIQUE:  AP, lateral, and Merchant views of the left knee were performed.    COMPARISON:  05/07/2022 left tibia and fibula.    FINDINGS:  Suboptimal positioning on the AP view with the knee appears to be in flexion.  Joint space is not adequately assessed.    No displaced fracture or dislocation.  Suboptimal positioning on AP view results in limited assessment of the tibiofemoral joint space and tibial plateau.  Suprapatellar effusion present.  Diffuse soft tissue edema present.                                        CT Thigh With Contrast Left (Final result)  Result time 05/07/22 19:04:28      Final result by Dre Real MD (05/07/22 19:04:28)                   Impression:      Findings concerning for left lower extremity cellulitis.    Knee joint effusion with some peripheral enhancement which could represent sterile effusion and nonspecific reactive synovitis; however, septic arthritis not excluded.  Additionally, there is a prepatellar small rim enhancing collection favored to represent nonspecific bursitis, although small abscess not excluded in the setting of surrounding cellulitis.  Clinical correlation advised.  Fluid sampling may be warranted.    Mild degenerative changes of the lower extremity as detailed above.    3.8 cm incidental left adnexal cyst.  For a patient of the this age group pelvic ultrasound follow-up in 6-12 months  is recommended per ACR guidelines, or sooner if associated pain develops.    Suspected leiomyomatous uterus.  Further evaluation with elective/nonemergent pelvic ultrasound can be obtained as warranted.    Additional findings above.    This report was flagged in Epic as abnormal.    Electronically signed by resident: Ramon Hammonds  Date:    05/07/2022  Time:    18:26    Electronically signed by: Dre Real MD  Date:    05/07/2022  Time:    19:04               Narrative:    EXAMINATION:  CT THIGH WITH CONTRAST LEFT; CT LEG (TIBIA-FIBULA) WITH CONTRAST LEFT    CLINICAL HISTORY:  Soft tissue infection suspected, thigh, xray done;; Soft tissue infection suspected, lower leg, xray done;    TECHNIQUE:  Axial images performed through the iliac crests to the foot of the left lower extremity after administration of 100 mL of intravenous Omnipaque 350.  Coronal and sagittal reformations performed.    COMPARISON:  Lower extremity veins ultrasound 05/07/2022    FINDINGS:  The bones of the left lower extremity demonstrate mild degenerative changes of the left knee indicated by subchondral sclerosis, osteophytic formation, and mild joint space narrowing.  Degenerative changes of the 1st metatarsophalangeal joint is noted as well.  No acute fracture.  No osseous destructive lesions.  Small suprapatellar and femorotibial joint effusions with some peripheral enhancement.    The soft tissues of the left lower extremity demonstrate multiple areas of soft tissue attenuation with peripheral calcification in the subcutaneous soft tissues overlying the lateral left thigh, perhaps relating to remote trauma or injection medication use.  Left inguinal skin thickening, likely postsurgical related to prior left superficial femoral artery branch laceration repair.  There is left inguinal lymphadenopathy, for example lymph node measuring 1.2 cm in short axis (series 301, image 845). Septation of the subcutaneous fat involving the left lower  extremity mainly in the lower leg, indicating edema.  Mild skin thickening overlying the left lateral lower leg.  There is approximate 2.8 x 1.2 x 2.8 cm slightly heterogeneous collection within enhancing rim along the anterior mid to lower aspect of the patella.    Limited intrapelvic evaluation demonstrates enlargement of the uterus with punctate internal calcifications, likely leiomyomatous uterus.  3.8 cm left adnexal hypodensity most consistent with a cyst.                                        CT Leg (Tibia-Fibula) Wtih Contrast Left (Final result)  Result time 05/07/22 19:04:28      Final result by Dre Real MD (05/07/22 19:04:28)                   Impression:      Findings concerning for left lower extremity cellulitis.    Knee joint effusion with some peripheral enhancement which could represent sterile effusion and nonspecific reactive synovitis; however, septic arthritis not excluded.  Additionally, there is a prepatellar small rim enhancing collection favored to represent nonspecific bursitis, although small abscess not excluded in the setting of surrounding cellulitis.  Clinical correlation advised.  Fluid sampling may be warranted.    Mild degenerative changes of the lower extremity as detailed above.    3.8 cm incidental left adnexal cyst.  For a patient of the this age group pelvic ultrasound follow-up in 6-12 months is recommended per ACR guidelines, or sooner if associated pain develops.    Suspected leiomyomatous uterus.  Further evaluation with elective/nonemergent pelvic ultrasound can be obtained as warranted.    Additional findings above.    This report was flagged in Epic as abnormal.    Electronically signed by resident: Ramon Hammonds  Date:    05/07/2022  Time:    18:26    Electronically signed by: Dre Real MD  Date:    05/07/2022  Time:    19:04               Narrative:    EXAMINATION:  CT THIGH WITH CONTRAST LEFT; CT LEG (TIBIA-FIBULA) WITH CONTRAST LEFT    CLINICAL  HISTORY:  Soft tissue infection suspected, thigh, xray done;; Soft tissue infection suspected, lower leg, xray done;    TECHNIQUE:  Axial images performed through the iliac crests to the foot of the left lower extremity after administration of 100 mL of intravenous Omnipaque 350.  Coronal and sagittal reformations performed.    COMPARISON:  Lower extremity veins ultrasound 05/07/2022    FINDINGS:  The bones of the left lower extremity demonstrate mild degenerative changes of the left knee indicated by subchondral sclerosis, osteophytic formation, and mild joint space narrowing.  Degenerative changes of the 1st metatarsophalangeal joint is noted as well.  No acute fracture.  No osseous destructive lesions.  Small suprapatellar and femorotibial joint effusions with some peripheral enhancement.    The soft tissues of the left lower extremity demonstrate multiple areas of soft tissue attenuation with peripheral calcification in the subcutaneous soft tissues overlying the lateral left thigh, perhaps relating to remote trauma or injection medication use.  Left inguinal skin thickening, likely postsurgical related to prior left superficial femoral artery branch laceration repair.  There is left inguinal lymphadenopathy, for example lymph node measuring 1.2 cm in short axis (series 301, image 845). Septation of the subcutaneous fat involving the left lower extremity mainly in the lower leg, indicating edema.  Mild skin thickening overlying the left lateral lower leg.  There is approximate 2.8 x 1.2 x 2.8 cm slightly heterogeneous collection within enhancing rim along the anterior mid to lower aspect of the patella.    Limited intrapelvic evaluation demonstrates enlargement of the uterus with punctate internal calcifications, likely leiomyomatous uterus.  3.8 cm left adnexal hypodensity most consistent with a cyst.                                       US Lower Extremity Veins Left (Final result)  Result time 05/07/22  14:23:00      Final result by Dre Real MD (05/07/22 14:23:00)                   Impression:      No evidence of deep venous thrombosis in the left lower extremity.      Electronically signed by: Dre Real MD  Date:    05/07/2022  Time:    14:23               Narrative:    EXAMINATION:  US LOWER EXTREMITY VEINS LEFT    CLINICAL HISTORY:  Other specified soft tissue disorders    TECHNIQUE:  Duplex and color flow Doppler evaluation and graded compression of the left lower extremity veins was performed.    COMPARISON:  None    FINDINGS:  Left thigh veins: The common femoral, femoral, popliteal, upper greater saphenous, and deep femoral veins are patent and free of thrombus. The veins are normally compressible and have normal phasic flow and augmentation response.    Left calf veins: The visualized calf veins are patent.    Contralateral CFV: The contralateral (right) common femoral vein is patent and free of thrombus.    Miscellaneous: Nonspecific subcutaneous edema at the left calf region.                                       X-Ray Tibia Fibula 2 View Left (Final result)  Result time 05/07/22 13:57:38      Final result by Joshua Fritz MD (05/07/22 13:57:38)                   Impression:      As above.      Electronically signed by: Joshua Frizt  Date:    05/07/2022  Time:    13:57               Narrative:    EXAMINATION:  XR TIBIA FIBULA 2 VIEW LEFT    CLINICAL HISTORY:  Pain in leg, unspecified    TECHNIQUE:  AP and lateral views of the left tibia and fibula were performed.    COMPARISON:  None.    FINDINGS:  No acute fracture, dislocation or osseous destruction.  No radiopaque foreign body.  Large calcaneal enthesophyte at the distal Achilles insertion.  Partial visualized femorotibial degenerative change.                                            Assessment/Plan:      Cellulitis of extremity  3/4 SIRS with 101F, , RR 22, with normal WBC#.   She has lactic acidosis of 3.8 and .    CT showed knee joint effusion with some peripheral enhancement which could represent sterile effusion and nonspecific reactive synovitis; however, septic arthritis not excluded, additionally, there is a prepatellar small rim enhancing collection favored to represent nonspecific bursitis, although small abscess not excluded in the setting of surrounding cellulitis of LLE.   -Vanc and Zosyn  -blood cx's  -sepsis fluids, LA trend  -GenSx consulted to assess  -Will consult ortho to see if fluid collection (vs bursitis) should be tapped    Volume overload  -Increasing to IV 80 mg BID      Iron deficiency anemia  Give iron after infection settles      Recurrent major depressive disorder, in partial remission  Continue duloxetine      Gastroesophageal reflux disease without esophagitis  Continue PPI      Pure hypercholesterolemia  Cont statin      ERENDIRA (obstructive sleep apnea)  Offer CPAP nightly      Type 2 diabetes mellitus with diabetic polyneuropathy, without long-term current use of insulin  Well controlled. A1c 5.7  Consider metoprolol at discharge if not on  Statin  Consider bariatric surgery      Morbid obesity with BMI of 45.0-49.9, adult  Body mass index is 46.48 kg/m². Morbid obesity complicates all aspects of disease management from diagnostic modalities to treatment. Weight loss encouraged and health benefits explained to patient.   -Given DM and Cardiac disease, consider bariatric surgery        Atrial Fibrillation (paroxysmal)  - S/p 2 cardioversion.  She is now in sinus.  - continue propafenone  - resume metoprolol.  Increase to home dose as tolerated.  - space continue apixaban    Essential hypertension  Continue home meds:  Lisinopril metoprolol nifedipine      Opioid dependence in remission  Noted        VTE Risk Mitigation (From admission, onward)         Ordered     apixaban tablet 5 mg  2 times daily         05/07/22 2000     IP VTE HIGH RISK PATIENT  Once         05/07/22 1953     Place sequential  compression device  Until discontinued         05/07/22 1953                Discharge Planning   RAYO: 5/11/2022     Code Status: Full Code   Is the patient medically ready for discharge?:     Reason for patient still in hospital (select all that apply): Patient trending condition, Treatment and Consult recommendations  Discharge Plan A: Home Health, Home with family ([facility]/ [facility] (would like reassigned if needed))                  Reed Zuleta MD  Department of Hospital Medicine   [facility] [address] - Telemetry Stepdown ([place] [address])

## 2022-05-10 NOTE — PLAN OF CARE
Plan of care reviewed with patient. Meds administered as prescribed. Resp even and unlabored. No acute resp distress noted. Patient denies needs at this time. Bed in low position. Call light within reach.       Problem: Adult Inpatient Plan of Care  Goal: Plan of Care Review  Outcome: Ongoing, Progressing  Goal: Patient-Specific Goal (Individualized)  Outcome: Ongoing, Progressing  Goal: Absence of Hospital-Acquired Illness or Injury  Outcome: Ongoing, Progressing  Goal: Optimal Comfort and Wellbeing  Outcome: Ongoing, Progressing  Goal: Readiness for Transition of Care  Outcome: Ongoing, Progressing     Problem: Bariatric Environmental Safety  Goal: Safety Maintained with Care  Outcome: Ongoing, Progressing     Problem: Diabetes Comorbidity  Goal: Blood Glucose Level Within Targeted Range  Outcome: Ongoing, Progressing     Problem: Fall Injury Risk  Goal: Absence of Fall and Fall-Related Injury  Outcome: Ongoing, Progressing     Problem: Skin or Soft Tissue Infection  Goal: Absence of Infection Signs and Symptoms  Outcome: Ongoing, Progressing     Problem: Pain Acute  Goal: Acceptable Pain Control and Functional Ability  Outcome: Ongoing, Progressing

## 2022-05-11 ENCOUNTER — TELEPHONE (OUTPATIENT)
Dept: OBSTETRICS AND GYNECOLOGY | Facility: CLINIC | Age: 57
End: 2022-05-11
Payer: COMMERCIAL

## 2022-05-11 LAB
ALBUMIN SERPL BCP-MCNC: 2.9 G/DL (ref 3.5–5.2)
ALP SERPL-CCNC: 101 U/L (ref 55–135)
ALT SERPL W/O P-5'-P-CCNC: 17 U/L (ref 10–44)
ANION GAP SERPL CALC-SCNC: 10 MMOL/L (ref 8–16)
AST SERPL-CCNC: 42 U/L (ref 10–40)
BASOPHILS # BLD AUTO: 0.03 K/UL (ref 0–0.2)
BASOPHILS NFR BLD: 0.6 % (ref 0–1.9)
BILIRUB SERPL-MCNC: 0.6 MG/DL (ref 0.1–1)
BUN SERPL-MCNC: 10 MG/DL (ref 6–20)
CALCIUM SERPL-MCNC: 8.6 MG/DL (ref 8.7–10.5)
CHLORIDE SERPL-SCNC: 100 MMOL/L (ref 95–110)
CO2 SERPL-SCNC: 26 MMOL/L (ref 23–29)
CREAT SERPL-MCNC: 1.3 MG/DL (ref 0.5–1.4)
DIFFERENTIAL METHOD: ABNORMAL
EOSINOPHIL # BLD AUTO: 0.4 K/UL (ref 0–0.5)
EOSINOPHIL NFR BLD: 7 % (ref 0–8)
ERYTHROCYTE [DISTWIDTH] IN BLOOD BY AUTOMATED COUNT: 17 % (ref 11.5–14.5)
EST. GFR  (AFRICAN AMERICAN): 52.3 ML/MIN/1.73 M^2
EST. GFR  (NON AFRICAN AMERICAN): 45.3 ML/MIN/1.73 M^2
GLUCOSE SERPL-MCNC: 147 MG/DL (ref 70–110)
HCT VFR BLD AUTO: 26.6 % (ref 37–48.5)
HGB BLD-MCNC: 8.3 G/DL (ref 12–16)
IMM GRANULOCYTES # BLD AUTO: 0.03 K/UL (ref 0–0.04)
IMM GRANULOCYTES NFR BLD AUTO: 0.6 % (ref 0–0.5)
LYMPHOCYTES # BLD AUTO: 1 K/UL (ref 1–4.8)
LYMPHOCYTES NFR BLD: 18 % (ref 18–48)
MAGNESIUM SERPL-MCNC: 1.6 MG/DL (ref 1.6–2.6)
MCH RBC QN AUTO: 27.2 PG (ref 27–31)
MCHC RBC AUTO-ENTMCNC: 31.2 G/DL (ref 32–36)
MCV RBC AUTO: 87 FL (ref 82–98)
MONOCYTES # BLD AUTO: 0.6 K/UL (ref 0.3–1)
MONOCYTES NFR BLD: 10.6 % (ref 4–15)
NEUTROPHILS # BLD AUTO: 3.4 K/UL (ref 1.8–7.7)
NEUTROPHILS NFR BLD: 63.2 % (ref 38–73)
NRBC BLD-RTO: 0 /100 WBC
PLATELET # BLD AUTO: 167 K/UL (ref 150–450)
PMV BLD AUTO: 11.3 FL (ref 9.2–12.9)
POTASSIUM SERPL-SCNC: 3.7 MMOL/L (ref 3.5–5.1)
PROT SERPL-MCNC: 6 G/DL (ref 6–8.4)
RBC # BLD AUTO: 3.05 M/UL (ref 4–5.4)
SODIUM SERPL-SCNC: 136 MMOL/L (ref 136–145)
VANCOMYCIN SERPL-MCNC: 14.7 UG/ML
WBC # BLD AUTO: 5.4 K/UL (ref 3.9–12.7)

## 2022-05-11 PROCEDURE — 25000003 PHARM REV CODE 250: Performed by: STUDENT IN AN ORGANIZED HEALTH CARE EDUCATION/TRAINING PROGRAM

## 2022-05-11 PROCEDURE — 83735 ASSAY OF MAGNESIUM: CPT | Performed by: STUDENT IN AN ORGANIZED HEALTH CARE EDUCATION/TRAINING PROGRAM

## 2022-05-11 PROCEDURE — 36415 COLL VENOUS BLD VENIPUNCTURE: CPT | Performed by: STUDENT IN AN ORGANIZED HEALTH CARE EDUCATION/TRAINING PROGRAM

## 2022-05-11 PROCEDURE — 85025 COMPLETE CBC W/AUTO DIFF WBC: CPT | Performed by: STUDENT IN AN ORGANIZED HEALTH CARE EDUCATION/TRAINING PROGRAM

## 2022-05-11 PROCEDURE — 80053 COMPREHEN METABOLIC PANEL: CPT | Performed by: STUDENT IN AN ORGANIZED HEALTH CARE EDUCATION/TRAINING PROGRAM

## 2022-05-11 PROCEDURE — 99233 PR SUBSEQUENT HOSPITAL CARE,LEVL III: ICD-10-PCS | Mod: ,,, | Performed by: STUDENT IN AN ORGANIZED HEALTH CARE EDUCATION/TRAINING PROGRAM

## 2022-05-11 PROCEDURE — 25000003 PHARM REV CODE 250: Performed by: INTERNAL MEDICINE

## 2022-05-11 PROCEDURE — 11000001 HC ACUTE MED/SURG PRIVATE ROOM

## 2022-05-11 PROCEDURE — 63600175 PHARM REV CODE 636 W HCPCS: Performed by: STUDENT IN AN ORGANIZED HEALTH CARE EDUCATION/TRAINING PROGRAM

## 2022-05-11 PROCEDURE — 80202 ASSAY OF VANCOMYCIN: CPT | Performed by: STUDENT IN AN ORGANIZED HEALTH CARE EDUCATION/TRAINING PROGRAM

## 2022-05-11 PROCEDURE — 99233 SBSQ HOSP IP/OBS HIGH 50: CPT | Mod: ,,, | Performed by: STUDENT IN AN ORGANIZED HEALTH CARE EDUCATION/TRAINING PROGRAM

## 2022-05-11 RX ORDER — MAGNESIUM SULFATE HEPTAHYDRATE 40 MG/ML
2 INJECTION, SOLUTION INTRAVENOUS ONCE
Status: COMPLETED | OUTPATIENT
Start: 2022-05-11 | End: 2022-05-11

## 2022-05-11 RX ORDER — HYDROMORPHONE HYDROCHLORIDE 1 MG/ML
1 INJECTION, SOLUTION INTRAMUSCULAR; INTRAVENOUS; SUBCUTANEOUS
Status: DISCONTINUED | OUTPATIENT
Start: 2022-05-11 | End: 2022-05-14 | Stop reason: HOSPADM

## 2022-05-11 RX ORDER — POTASSIUM CHLORIDE 20 MEQ/1
40 TABLET, EXTENDED RELEASE ORAL ONCE
Status: COMPLETED | OUTPATIENT
Start: 2022-05-11 | End: 2022-05-11

## 2022-05-11 RX ADMIN — PROPAFENONE HYDROCHLORIDE 225 MG: 150 TABLET, FILM COATED ORAL at 10:05

## 2022-05-11 RX ADMIN — VANCOMYCIN HYDROCHLORIDE 1250 MG: 1.25 INJECTION, POWDER, LYOPHILIZED, FOR SOLUTION INTRAVENOUS at 10:05

## 2022-05-11 RX ADMIN — ALPRAZOLAM 0.5 MG: 0.25 TABLET ORAL at 10:05

## 2022-05-11 RX ADMIN — PANTOPRAZOLE SODIUM 40 MG: 40 TABLET, DELAYED RELEASE ORAL at 08:05

## 2022-05-11 RX ADMIN — ATORVASTATIN CALCIUM 40 MG: 40 TABLET, FILM COATED ORAL at 08:05

## 2022-05-11 RX ADMIN — METOPROLOL SUCCINATE 12.5 MG: 25 TABLET, EXTENDED RELEASE ORAL at 04:05

## 2022-05-11 RX ADMIN — FUROSEMIDE 80 MG: 10 INJECTION, SOLUTION INTRAMUSCULAR; INTRAVENOUS at 05:05

## 2022-05-11 RX ADMIN — METOPROLOL SUCCINATE 12.5 MG: 25 TABLET, EXTENDED RELEASE ORAL at 10:05

## 2022-05-11 RX ADMIN — MEDROXYPROGESTERONE ACETATE 20 MG: 10 TABLET ORAL at 08:05

## 2022-05-11 RX ADMIN — HYDROMORPHONE HYDROCHLORIDE 1 MG: 1 INJECTION, SOLUTION INTRAMUSCULAR; INTRAVENOUS; SUBCUTANEOUS at 11:05

## 2022-05-11 RX ADMIN — NIFEDIPINE 30 MG: 30 TABLET, FILM COATED, EXTENDED RELEASE ORAL at 08:05

## 2022-05-11 RX ADMIN — HYDROMORPHONE HYDROCHLORIDE 0.5 MG: 1 INJECTION, SOLUTION INTRAMUSCULAR; INTRAVENOUS; SUBCUTANEOUS at 11:05

## 2022-05-11 RX ADMIN — OXYCODONE 5 MG: 5 TABLET ORAL at 08:05

## 2022-05-11 RX ADMIN — CELECOXIB 100 MG: 100 CAPSULE ORAL at 08:05

## 2022-05-11 RX ADMIN — HYDROMORPHONE HYDROCHLORIDE 0.5 MG: 1 INJECTION, SOLUTION INTRAMUSCULAR; INTRAVENOUS; SUBCUTANEOUS at 04:05

## 2022-05-11 RX ADMIN — MAGNESIUM SULFATE 2 G: 2 INJECTION INTRAVENOUS at 03:05

## 2022-05-11 RX ADMIN — THERA TABS 1 TABLET: TAB at 08:05

## 2022-05-11 RX ADMIN — DULOXETINE 120 MG: 60 CAPSULE, DELAYED RELEASE ORAL at 08:05

## 2022-05-11 RX ADMIN — MEDROXYPROGESTERONE ACETATE 20 MG: 10 TABLET ORAL at 10:05

## 2022-05-11 RX ADMIN — MELATONIN TAB 3 MG 6 MG: 3 TAB at 10:05

## 2022-05-11 RX ADMIN — MICONAZOLE NITRATE 2 % TOPICAL POWDER: at 09:05

## 2022-05-11 RX ADMIN — TRAZODONE HYDROCHLORIDE 50 MG: 50 TABLET ORAL at 11:05

## 2022-05-11 RX ADMIN — PROPAFENONE HYDROCHLORIDE 225 MG: 150 TABLET, FILM COATED ORAL at 04:05

## 2022-05-11 RX ADMIN — PIPERACILLIN SODIUM AND TAZOBACTAM SODIUM 4.5 G: 4; .5 INJECTION, POWDER, LYOPHILIZED, FOR SOLUTION INTRAVENOUS at 05:05

## 2022-05-11 RX ADMIN — HYDROMORPHONE HYDROCHLORIDE 0.5 MG: 1 INJECTION, SOLUTION INTRAMUSCULAR; INTRAVENOUS; SUBCUTANEOUS at 03:05

## 2022-05-11 RX ADMIN — MICONAZOLE NITRATE 2 % TOPICAL POWDER: at 08:05

## 2022-05-11 RX ADMIN — POTASSIUM CHLORIDE 40 MEQ: 1500 TABLET, EXTENDED RELEASE ORAL at 04:05

## 2022-05-11 RX ADMIN — PROPAFENONE HYDROCHLORIDE 225 MG: 150 TABLET, FILM COATED ORAL at 05:05

## 2022-05-11 RX ADMIN — MEDROXYPROGESTERONE ACETATE 20 MG: 10 TABLET ORAL at 03:05

## 2022-05-11 RX ADMIN — PIPERACILLIN SODIUM AND TAZOBACTAM SODIUM 4.5 G: 4; .5 INJECTION, POWDER, LYOPHILIZED, FOR SOLUTION INTRAVENOUS at 12:05

## 2022-05-11 RX ADMIN — CELECOXIB 100 MG: 100 CAPSULE ORAL at 10:05

## 2022-05-11 RX ADMIN — DIPHENHYDRAMINE HYDROCHLORIDE 25 MG: 25 CAPSULE ORAL at 12:05

## 2022-05-11 RX ADMIN — PIPERACILLIN SODIUM AND TAZOBACTAM SODIUM 4.5 G: 4; .5 INJECTION, POWDER, LYOPHILIZED, FOR SOLUTION INTRAVENOUS at 10:05

## 2022-05-11 RX ADMIN — HYDROMORPHONE HYDROCHLORIDE 1 MG: 1 INJECTION, SOLUTION INTRAMUSCULAR; INTRAVENOUS; SUBCUTANEOUS at 08:05

## 2022-05-11 RX ADMIN — DIPHENHYDRAMINE HYDROCHLORIDE 25 MG: 25 CAPSULE ORAL at 11:05

## 2022-05-11 NOTE — NURSING
Patient had a restless night. C/o left knee and leg pain. Pain is managed by PRN dilaudid and Oxycodone. Patient on IV antibiotics. Redness to Left knee remains with perimeter. No acute distress noted. Call light in reach

## 2022-05-11 NOTE — SUBJECTIVE & OBJECTIVE
Principal Problem:Left leg cellulitis    Principal Orthopedic Problem:  Prepatellar septic bursitis.    Interval History: Patient seen and examined at bedside.  Cellulitis has completely resolved except directly around the area of prepatellar bursitis. Septic prepatella bursitis unfortunately not responding to IVAB.      Review of patient's allergies indicates:   Allergen Reactions    Flecainide Shortness Of Breath and Swelling       Current Facility-Administered Medications   Medication    acetaminophen tablet 650 mg    ALPRAZolam tablet 0.5 mg    apixaban tablet 5 mg    atorvastatin tablet 40 mg    celecoxib capsule 100 mg    diphenhydrAMINE capsule 25 mg    DULoxetine DR capsule 120 mg    furosemide injection 80 mg    glucagon (human recombinant) injection 1 mg    glucose chewable tablet 16 g    glucose chewable tablet 24 g    HYDROmorphone injection 0.5 mg    medroxyPROGESTERone tablet 20 mg    melatonin tablet 6 mg    metoprolol succinate (TOPROL-XL) 24 hr split tablet 12.5 mg    miconazole NITRATE 2 % top powder    multivitamin tablet    naloxone 0.4 mg/mL injection 0.02 mg    NIFEdipine 24 hr tablet 30 mg    ondansetron disintegrating tablet 8 mg    oxyCODONE immediate release tablet 5 mg    pantoprazole EC tablet 40 mg    piperacillin-tazobactam 4.5 g in sodium chloride 0.9% 100 mL IVPB (ready to mix system)    polyethylene glycol packet 17 g    propafenone tablet 225 mg    traZODone tablet 50 mg    vancomycin - pharmacy to dose     Facility-Administered Medications Ordered in Other Encounters   Medication    sodium chloride 0.9% bolus 1,000 mL     Objective:     Vital Signs (Most Recent):  Temp: 98.1 °F (36.7 °C) (05/11/22 0359)  Pulse: 85 (05/11/22 0359)  Resp: 18 (05/11/22 0420)  BP: (!) 136/59 (05/11/22 0359)  SpO2: 95 % (05/11/22 0359)   Vital Signs (24h Range):  Temp:  [97.7 °F (36.5 °C)-98.1 °F (36.7 °C)] 98.1 °F (36.7 °C)  Pulse:  [80-85] 85  Resp:  [16-20] 18  SpO2:  [95 %-98 %] 95 %  BP:  "(109-136)/(54-75) 136/59     Weight: 125.7 kg (277 lb 1.9 oz)  Height: 5' 6" (167.6 cm)  Body mass index is 44.73 kg/m².    No intake or output data in the 24 hours ending 05/11/22 0638      Ortho/SPM Exam  Physical Exam:  NAD, A/O x 3.  She large amount of erythema around the knee that has visibly resolved.  She does have a small amount of fluid noted anterior aspect of the knee into some surrounding erythema.  Area proximally 3 x 3 cm.  Full passive, active range of motion of the knee without pain.  Is able to bear weight.      Significant Labs: All pertinent labs within the past 24 hours have been reviewed.    Significant Imaging: I have reviewed and interpreted all pertinent imaging results/findings.  "

## 2022-05-11 NOTE — PROGRESS NOTES
Pharmacokinetic Assessment Follow Up: IV Vancomycin    Vancomycin serum concentration assessment(s):    The random level was drawn correctly and can be used to guide therapy at this time. The measurement is below the desired definitive target range of 15 to 20 mcg/mL.    Vancomycin Regimen Plan:    Change regimen to Vancomycin 1250 mg IV every 12 hours with next serum trough concentration measured at 0900 prior to 3rd dose on 05/12/22    Drug levels (last 3 results):  Recent Labs   Lab Result Units 05/09/22  0824 05/10/22  1407 05/11/22  0441   Vancomycin, Random ug/mL  --   --  14.7   Vancomycin-Trough ug/mL 22.5* 26.8*  --        Pharmacy will continue to follow and monitor vancomycin.    Please contact pharmacy at [email or web address] [phone] for questions regarding this assessment.    Thank you for the consult,   Marah Lewis       Patient brief summary:  Vicky Alvarado is a 57 y.o. female initiated on antimicrobial therapy with IV Vancomycin for treatment of septic bursitis        Drug Allergies:   Review of patient's allergies indicates:   Allergen Reactions    Flecainide Shortness Of Breath and Swelling       Actual Body Weight:   125.7 kg    Renal Function:   Estimated Creatinine Clearance: 64 mL/min (based on SCr of 1.3 mg/dL).,     Dialysis Method (if applicable):  N/A    CBC (last 72 hours):  Recent Labs   Lab Result Units 05/09/22  0651 05/10/22  0504 05/11/22  0441   WBC K/uL 6.16 5.12 5.40   Hemoglobin g/dL 8.5* 8.0* 8.3*   Hematocrit % 29.5* 25.7* 26.6*   Platelets K/uL 123* 153 167   Gran % % 65.2 60.2 63.2   Lymph % % 18.8 21.1 18.0   Mono % % 9.1 10.7 10.6   Eosinophil % % 6.0 6.4 7.0   Basophil % % 0.6 0.8 0.6   Differential Method  Automated Automated Automated       Metabolic Panel (last 72 hours):  Recent Labs   Lab Result Units 05/09/22  0651 05/10/22  0504 05/11/22  0441   Sodium mmol/L 132* 133* 136   Potassium mmol/L 3.7 3.5 3.7   Chloride mmol/L 97 98 100   CO2 mmol/L 24 25 26    Glucose mg/dL 133* 157* 147*   BUN mg/dL 11 11 10   Creatinine mg/dL 1.0 1.3 1.3   Albumin g/dL 2.9* 2.6* 2.9*   Total Bilirubin mg/dL 0.9 0.6 0.6   Alkaline Phosphatase U/L 105 95 101   AST U/L 50* 43* 42*   ALT U/L 21 19 17   Magnesium mg/dL 1.7 1.7 1.6       Vancomycin Administrations:  vancomycin given in the last 96 hours                   vancomycin 1.25 g in dextrose 5% 250 mL IVPB (ready to mix) (mg) 1,250 mg New Bag 05/11/22 1019    vancomycin 1.75 g in 5 % dextrose 500 mL IVPB (mg) 1,750 mg New Bag 05/10/22 0341     1,750 mg New Bag 05/09/22 1502     1,750 mg New Bag  0305    vancomycin 1.75 g in 5 % dextrose 500 mL IVPB (mg) 1,750 mg New Bag 05/08/22 0239    vancomycin 2 g in dextrose 5 % 500 mL IVPB (mg) 2,000 mg New Bag 05/07/22 1502                Microbiologic Results:  Microbiology Results (last 7 days)     Procedure Component Value Units Date/Time    Blood Culture #1 **CANNOT BE ORDERED STAT** [[id number]] Collected: 05/07/22 1246    Order Status: Completed Specimen: Blood from Peripheral, Antecubital, Left Updated: 05/10/22 1412     Blood Culture, Routine No Growth to date      No Growth to date      No Growth to date      No Growth to date    Blood Culture #2 **CANNOT BE ORDERED STAT** [[id number]] Collected: 05/07/22 1258    Order Status: Completed Specimen: Blood from Peripheral, Antecubital, Right Updated: 05/10/22 1412     Blood Culture, Routine No Growth to date      No Growth to date      No Growth to date      No Growth to date

## 2022-05-11 NOTE — SUBJECTIVE & OBJECTIVE
NAEON. Denies SOB or CP. Eating okay. UOP wnl. BM wnl.   Overall pain and swelling improving, knee still with pain but less so.      Review of Systems   Constitutional:  Positive for activity change and fever.   HENT:  Negative for sore throat and trouble swallowing.    Eyes:  Negative for photophobia and visual disturbance.   Respiratory:  Negative for chest tightness and shortness of breath.    Cardiovascular:  Negative for chest pain, palpitations and leg swelling.   Gastrointestinal:  Negative for abdominal distention, abdominal pain, blood in stool, constipation, diarrhea, nausea and vomiting.   Endocrine: Negative for polyphagia and polyuria.   Genitourinary:  Negative for difficulty urinating, dysuria and hematuria.   Musculoskeletal:  Positive for joint swelling.   Skin:  Positive for color change.   Allergic/Immunologic: Negative for immunocompromised state.   Neurological:  Negative for dizziness, seizures, syncope and facial asymmetry.   Psychiatric/Behavioral:  Negative for agitation, behavioral problems and confusion.    Objective:     Vital Signs (Most Recent):  Temp: 97.9 °F (36.6 °C) (05/11/22 1135)  Pulse: 93 (05/11/22 1135)  Resp: 17 (05/11/22 1135)  BP: (!) 158/73 (05/11/22 1135)  SpO2: 95 % (05/11/22 1135)   Vital Signs (24h Range):  Temp:  [97.8 °F (36.6 °C)-98.2 °F (36.8 °C)] 97.9 °F (36.6 °C)  Pulse:  [75-93] 93  Resp:  [16-20] 17  SpO2:  [95 %-99 %] 95 %  BP: (109-158)/(54-75) 158/73     Weight: 125.7 kg (277 lb 1.9 oz)  Body mass index is 44.73 kg/m².    Physical Exam  Vitals and nursing note reviewed.   Constitutional:       Appearance: She is well-developed. She is obese. She is ill-appearing. She is not toxic-appearing or diaphoretic.   HENT:      Head: Normocephalic and atraumatic.      Nose: Nose normal. No congestion.   Eyes:      General: No scleral icterus.     Pupils: Pupils are equal, round, and reactive to light.   Neck:      Thyroid: No thyromegaly.   Cardiovascular:      Rate  and Rhythm: Normal rate and regular rhythm.      Heart sounds: No murmur heard.    No gallop.   Pulmonary:      Effort: Pulmonary effort is normal.      Breath sounds: Normal breath sounds. No stridor. No wheezing or rales.   Abdominal:      General: There is no distension.      Palpations: Abdomen is soft. There is no mass.      Tenderness: There is no abdominal tenderness. There is no guarding.   Musculoskeletal:         General: Swelling and tenderness present. No signs of injury. Normal range of motion.      Cervical back: Normal range of motion and neck supple. No rigidity.      Right lower leg: No edema.      Left lower leg: Edema present.   Lymphadenopathy:      Cervical: No cervical adenopathy.   Skin:     General: Skin is warm and dry.      Capillary Refill: Capillary refill takes less than 2 seconds.      Findings: Erythema present.   Neurological:      General: No focal deficit present.      Mental Status: She is alert and oriented to person, place, and time.      Cranial Nerves: No cranial nerve deficit.      Motor: No weakness.   Psychiatric:         Mood and Affect: Mood normal.         Behavior: Behavior normal.         CRANIAL NERVES     CN III, IV, VI   Pupils are equal, round, and reactive to light.     +2 PE on RLE and +1 PE on LLE    Recent Results (from the past 24 hour(s))   CBC Auto Differential    Collection Time: 05/11/22  4:41 AM   Result Value Ref Range    WBC 5.40 3.90 - 12.70 K/uL    RBC 3.05 (L) 4.00 - 5.40 M/uL    Hemoglobin 8.3 (L) 12.0 - 16.0 g/dL    Hematocrit 26.6 (L) 37.0 - 48.5 %    MCV 87 82 - 98 fL    MCH 27.2 27.0 - 31.0 pg    MCHC 31.2 (L) 32.0 - 36.0 g/dL    RDW 17.0 (H) 11.5 - 14.5 %    Platelets 167 150 - 450 K/uL    MPV 11.3 9.2 - 12.9 fL    Immature Granulocytes 0.6 (H) 0.0 - 0.5 %    Gran # (ANC) 3.4 1.8 - 7.7 K/uL    Immature Grans (Abs) 0.03 0.00 - 0.04 K/uL    Lymph # 1.0 1.0 - 4.8 K/uL    Mono # 0.6 0.3 - 1.0 K/uL    Eos # 0.4 0.0 - 0.5 K/uL    Baso # 0.03 0.00 -  0.20 K/uL    nRBC 0 0 /100 WBC    Gran % 63.2 38.0 - 73.0 %    Lymph % 18.0 18.0 - 48.0 %    Mono % 10.6 4.0 - 15.0 %    Eosinophil % 7.0 0.0 - 8.0 %    Basophil % 0.6 0.0 - 1.9 %    Differential Method Automated    Comprehensive Metabolic Panel    Collection Time: 05/11/22  4:41 AM   Result Value Ref Range    Sodium 136 136 - 145 mmol/L    Potassium 3.7 3.5 - 5.1 mmol/L    Chloride 100 95 - 110 mmol/L    CO2 26 23 - 29 mmol/L    Glucose 147 (H) 70 - 110 mg/dL    BUN 10 6 - 20 mg/dL    Creatinine 1.3 0.5 - 1.4 mg/dL    Calcium 8.6 (L) 8.7 - 10.5 mg/dL    Total Protein 6.0 6.0 - 8.4 g/dL    Albumin 2.9 (L) 3.5 - 5.2 g/dL    Total Bilirubin 0.6 0.1 - 1.0 mg/dL    Alkaline Phosphatase 101 55 - 135 U/L    AST 42 (H) 10 - 40 U/L    ALT 17 10 - 44 U/L    Anion Gap 10 8 - 16 mmol/L    eGFR if African American 52.3 (A) >60 mL/min/1.73 m^2    eGFR if non  45.3 (A) >60 mL/min/1.73 m^2   Magnesium    Collection Time: 05/11/22  4:41 AM   Result Value Ref Range    Magnesium 1.6 1.6 - 2.6 mg/dL   Vancomycin, random    Collection Time: 05/11/22  4:41 AM   Result Value Ref Range    Vancomycin, Random 14.7 Not established ug/mL       Microbiology Results (last 7 days)       Procedure Component Value Units Date/Time    Blood Culture #1 **CANNOT BE ORDERED STAT** [[id number]] Collected: 05/07/22 1246    Order Status: Completed Specimen: Blood from Peripheral, Antecubital, Left Updated: 05/10/22 1412     Blood Culture, Routine No Growth to date      No Growth to date      No Growth to date      No Growth to date    Blood Culture #2 **CANNOT BE ORDERED STAT** [[id number]] Collected: 05/07/22 1258    Order Status: Completed Specimen: Blood from Peripheral, Antecubital, Right Updated: 05/10/22 1412     Blood Culture, Routine No Growth to date      No Growth to date      No Growth to date      No Growth to date             Imaging Results              X-Ray Knee 3 View Left (Final result)  Result time 05/08/22 01:13:57       Final result by Timur Sanabria MD (05/08/22 01:13:57)                   Impression:      As above.      Electronically signed by: Timur Sanabria MD  Date:    05/08/2022  Time:    01:13               Narrative:    EXAMINATION:  XR KNEE 3 VIEW LEFT    CLINICAL HISTORY:  postop;    TECHNIQUE:  AP, lateral, and Merchant views of the left knee were performed.    COMPARISON:  05/07/2022 left tibia and fibula.    FINDINGS:  Suboptimal positioning on the AP view with the knee appears to be in flexion.  Joint space is not adequately assessed.    No displaced fracture or dislocation.  Suboptimal positioning on AP view results in limited assessment of the tibiofemoral joint space and tibial plateau.  Suprapatellar effusion present.  Diffuse soft tissue edema present.                                        CT Thigh With Contrast Left (Final result)  Result time 05/07/22 19:04:28      Final result by Dre Real MD (05/07/22 19:04:28)                   Impression:      Findings concerning for left lower extremity cellulitis.    Knee joint effusion with some peripheral enhancement which could represent sterile effusion and nonspecific reactive synovitis; however, septic arthritis not excluded.  Additionally, there is a prepatellar small rim enhancing collection favored to represent nonspecific bursitis, although small abscess not excluded in the setting of surrounding cellulitis.  Clinical correlation advised.  Fluid sampling may be warranted.    Mild degenerative changes of the lower extremity as detailed above.    3.8 cm incidental left adnexal cyst.  For a patient of the this age group pelvic ultrasound follow-up in 6-12 months is recommended per ACR guidelines, or sooner if associated pain develops.    Suspected leiomyomatous uterus.  Further evaluation with elective/nonemergent pelvic ultrasound can be obtained as warranted.    Additional findings above.    This report was flagged in Epic as  abnormal.    Electronically signed by resident: Ramon Hammonds  Date:    05/07/2022  Time:    18:26    Electronically signed by: Dre Real MD  Date:    05/07/2022  Time:    19:04               Narrative:    EXAMINATION:  CT THIGH WITH CONTRAST LEFT; CT LEG (TIBIA-FIBULA) WITH CONTRAST LEFT    CLINICAL HISTORY:  Soft tissue infection suspected, thigh, xray done;; Soft tissue infection suspected, lower leg, xray done;    TECHNIQUE:  Axial images performed through the iliac crests to the foot of the left lower extremity after administration of 100 mL of intravenous Omnipaque 350.  Coronal and sagittal reformations performed.    COMPARISON:  Lower extremity veins ultrasound 05/07/2022    FINDINGS:  The bones of the left lower extremity demonstrate mild degenerative changes of the left knee indicated by subchondral sclerosis, osteophytic formation, and mild joint space narrowing.  Degenerative changes of the 1st metatarsophalangeal joint is noted as well.  No acute fracture.  No osseous destructive lesions.  Small suprapatellar and femorotibial joint effusions with some peripheral enhancement.    The soft tissues of the left lower extremity demonstrate multiple areas of soft tissue attenuation with peripheral calcification in the subcutaneous soft tissues overlying the lateral left thigh, perhaps relating to remote trauma or injection medication use.  Left inguinal skin thickening, likely postsurgical related to prior left superficial femoral artery branch laceration repair.  There is left inguinal lymphadenopathy, for example lymph node measuring 1.2 cm in short axis (series 301, image 845). Septation of the subcutaneous fat involving the left lower extremity mainly in the lower leg, indicating edema.  Mild skin thickening overlying the left lateral lower leg.  There is approximate 2.8 x 1.2 x 2.8 cm slightly heterogeneous collection within enhancing rim along the anterior mid to lower aspect of the  patella.    Limited intrapelvic evaluation demonstrates enlargement of the uterus with punctate internal calcifications, likely leiomyomatous uterus.  3.8 cm left adnexal hypodensity most consistent with a cyst.                                        CT Leg (Tibia-Fibula) Wtih Contrast Left (Final result)  Result time 05/07/22 19:04:28      Final result by Dre Real MD (05/07/22 19:04:28)                   Impression:      Findings concerning for left lower extremity cellulitis.    Knee joint effusion with some peripheral enhancement which could represent sterile effusion and nonspecific reactive synovitis; however, septic arthritis not excluded.  Additionally, there is a prepatellar small rim enhancing collection favored to represent nonspecific bursitis, although small abscess not excluded in the setting of surrounding cellulitis.  Clinical correlation advised.  Fluid sampling may be warranted.    Mild degenerative changes of the lower extremity as detailed above.    3.8 cm incidental left adnexal cyst.  For a patient of the this age group pelvic ultrasound follow-up in 6-12 months is recommended per ACR guidelines, or sooner if associated pain develops.    Suspected leiomyomatous uterus.  Further evaluation with elective/nonemergent pelvic ultrasound can be obtained as warranted.    Additional findings above.    This report was flagged in Epic as abnormal.    Electronically signed by resident: Ramon Hammonds  Date:    05/07/2022  Time:    18:26    Electronically signed by: Dre Real MD  Date:    05/07/2022  Time:    19:04               Narrative:    EXAMINATION:  CT THIGH WITH CONTRAST LEFT; CT LEG (TIBIA-FIBULA) WITH CONTRAST LEFT    CLINICAL HISTORY:  Soft tissue infection suspected, thigh, xray done;; Soft tissue infection suspected, lower leg, xray done;    TECHNIQUE:  Axial images performed through the iliac crests to the foot of the left lower extremity after administration of 100 mL of  intravenous Omnipaque 350.  Coronal and sagittal reformations performed.    COMPARISON:  Lower extremity veins ultrasound 05/07/2022    FINDINGS:  The bones of the left lower extremity demonstrate mild degenerative changes of the left knee indicated by subchondral sclerosis, osteophytic formation, and mild joint space narrowing.  Degenerative changes of the 1st metatarsophalangeal joint is noted as well.  No acute fracture.  No osseous destructive lesions.  Small suprapatellar and femorotibial joint effusions with some peripheral enhancement.    The soft tissues of the left lower extremity demonstrate multiple areas of soft tissue attenuation with peripheral calcification in the subcutaneous soft tissues overlying the lateral left thigh, perhaps relating to remote trauma or injection medication use.  Left inguinal skin thickening, likely postsurgical related to prior left superficial femoral artery branch laceration repair.  There is left inguinal lymphadenopathy, for example lymph node measuring 1.2 cm in short axis (series 301, image 845). Septation of the subcutaneous fat involving the left lower extremity mainly in the lower leg, indicating edema.  Mild skin thickening overlying the left lateral lower leg.  There is approximate 2.8 x 1.2 x 2.8 cm slightly heterogeneous collection within enhancing rim along the anterior mid to lower aspect of the patella.    Limited intrapelvic evaluation demonstrates enlargement of the uterus with punctate internal calcifications, likely leiomyomatous uterus.  3.8 cm left adnexal hypodensity most consistent with a cyst.                                       US Lower Extremity Veins Left (Final result)  Result time 05/07/22 14:23:00      Final result by Dre Real MD (05/07/22 14:23:00)                   Impression:      No evidence of deep venous thrombosis in the left lower extremity.      Electronically signed by: Dre Real MD  Date:    05/07/2022  Time:    14:23                Narrative:    EXAMINATION:  US LOWER EXTREMITY VEINS LEFT    CLINICAL HISTORY:  Other specified soft tissue disorders    TECHNIQUE:  Duplex and color flow Doppler evaluation and graded compression of the left lower extremity veins was performed.    COMPARISON:  None    FINDINGS:  Left thigh veins: The common femoral, femoral, popliteal, upper greater saphenous, and deep femoral veins are patent and free of thrombus. The veins are normally compressible and have normal phasic flow and augmentation response.    Left calf veins: The visualized calf veins are patent.    Contralateral CFV: The contralateral (right) common femoral vein is patent and free of thrombus.    Miscellaneous: Nonspecific subcutaneous edema at the left calf region.                                       X-Ray Tibia Fibula 2 View Left (Final result)  Result time 05/07/22 13:57:38      Final result by Joshua Fritz MD (05/07/22 13:57:38)                   Impression:      As above.      Electronically signed by: Joshua Fritz  Date:    05/07/2022  Time:    13:57               Narrative:    EXAMINATION:  XR TIBIA FIBULA 2 VIEW LEFT    CLINICAL HISTORY:  Pain in leg, unspecified    TECHNIQUE:  AP and lateral views of the left tibia and fibula were performed.    COMPARISON:  None.    FINDINGS:  No acute fracture, dislocation or osseous destruction.  No radiopaque foreign body.  Large calcaneal enthesophyte at the distal Achilles insertion.  Partial visualized femorotibial degenerative change.

## 2022-05-11 NOTE — ASSESSMENT & PLAN NOTE
- S/p 2 cardioversion.  She is now in sinus.  - continue propafenone  - resume metoprolol.  Increase to home dose as tolerated.  - Restart apixaban after procedure

## 2022-05-11 NOTE — PLAN OF CARE
Problem: Adult Inpatient Plan of Care  Goal: Plan of Care Review  Outcome: Ongoing, Progressing  Goal: Patient-Specific Goal (Individualized)  Outcome: Ongoing, Progressing  Goal: Absence of Hospital-Acquired Illness or Injury  Outcome: Ongoing, Progressing  Goal: Optimal Comfort and Wellbeing  Outcome: Ongoing, Progressing  Goal: Readiness for Transition of Care  Outcome: Ongoing, Progressing     Problem: Bariatric Environmental Safety  Goal: Safety Maintained with Care  Outcome: Ongoing, Progressing     Problem: Diabetes Comorbidity  Goal: Blood Glucose Level Within Targeted Range  Outcome: Ongoing, Progressing     Problem: Fall Injury Risk  Goal: Absence of Fall and Fall-Related Injury  Outcome: Ongoing, Progressing     Problem: Skin or Soft Tissue Infection  Goal: Absence of Infection Signs and Symptoms  Outcome: Ongoing, Progressing     Problem: Pain Acute  Goal: Acceptable Pain Control and Functional Ability  Outcome: Ongoing, Progressing

## 2022-05-11 NOTE — TELEPHONE ENCOUNTER
Pt is in hospital today and will be having knee surgery tomorrow.  Per Dr Barriga pt must have a surgery consult with exam and be cleared for surgery prior to surgery dates being done.  Pt will message Dr Barriga office once out of hospital and cleared from surgery to RS appt  Pt verbalized understanding.

## 2022-05-11 NOTE — NURSING
Plan of care reviewed with patient. Patient verbalized understanding. Resp even and unlabored. No acute resp distress noted. Medications given as ordered. No needs made known at this time. Bed in low position. Call light within reach.

## 2022-05-11 NOTE — PROGRESS NOTES
[facility] - Telemetry Stepdown ([address] Medicine  Progress Note    Patient Name: Vicky Alvarado  MRN: [id number]  Patient Class: IP- Inpatient   Admission Date: 5/7/2022  Length of Stay: 2 days  Attending Physician: Reed Zuleta MD  Primary Care Provider: Gordy Cordero MD        Subjective:     Principal Problem:Left leg cellulitis        HPI:    Vicky Alvarado is a 57 y.o. female who has a past medical history of Anticoagulant long-term use, Arthritis, Atrial fibrillation, vascular necrosis, CHF, Depression, GERD, psychiatric care, Hypertension, Iron deficiency anemia, Obese, now Pre-diabetes, Psychiatric problem, and Sleep apnea, presented to the ED with Erythema and Pain to her left leg. Which started about 2 days ago. Patient had 101F yesterday. She attempted urgent care but they were unable to see her. On presentation today, her leg is erythematous and tender to touch. She denies any injury. 3/4 SIRS with 101F, , RR 22, with normal WBC#. She has lactic acidosis of 3.8 and . CT showed knee joint effusion with some peripheral enhancement which could represent sterile effusion and nonspecific reactive synovitis; however, septic arthritis not excluded, additionally, there is a prepatellar small rim enhancing collection favored to represent nonspecific bursitis, although small abscess not excluded in the setting of surrounding cellulitis of LLE.     Incidental/new findings: 3.8 cm incidental left adnexal cyst, suspected leiomyomatous uterus, iron deficient.      Overview/Hospital Course:  Admitted with Cellulitis and LA of 3.8, , . Erythema, Swelling and tenderness reducing, except her knee which she states hurts worse today. Continuing IV abx, concern about going through cellulitis to bursa and may seed it (if not primary cause), will discuss with ortho. Erythema resolving. Still with pain and a bit of redness around knee, ortho will check tomorrow to make  sure still improving. Still +2 PE on RLE and +1 PE on LLE, will continue lasix, reduce BP meds for now. ERythema almost gone today, except portion overlying knee, will monitor one more day, appreciate ortho following. Increasing to IV 80 mg BID with quite a bit of fluid remaining. Apixaban held today, Ortho tentatively planning for Bursectomy tomorrow 5/12, NPO @mnHuseyin WONG. Denies SOB or CP. Eating okay. UOP wnl. BM wnl.   Overall pain and swelling improving, knee still with pain but less so.      Review of Systems   Constitutional:  Positive for activity change and fever.   HENT:  Negative for sore throat and trouble swallowing.    Eyes:  Negative for photophobia and visual disturbance.   Respiratory:  Negative for chest tightness and shortness of breath.    Cardiovascular:  Negative for chest pain, palpitations and leg swelling.   Gastrointestinal:  Negative for abdominal distention, abdominal pain, blood in stool, constipation, diarrhea, nausea and vomiting.   Endocrine: Negative for polyphagia and polyuria.   Genitourinary:  Negative for difficulty urinating, dysuria and hematuria.   Musculoskeletal:  Positive for joint swelling.   Skin:  Positive for color change.   Allergic/Immunologic: Negative for immunocompromised state.   Neurological:  Negative for dizziness, seizures, syncope and facial asymmetry.   Psychiatric/Behavioral:  Negative for agitation, behavioral problems and confusion.    Objective:     Vital Signs (Most Recent):  Temp: 97.9 °F (36.6 °C) (05/11/22 1135)  Pulse: 93 (05/11/22 1135)  Resp: 17 (05/11/22 1135)  BP: (!) 158/73 (05/11/22 1135)  SpO2: 95 % (05/11/22 1135)   Vital Signs (24h Range):  Temp:  [97.8 °F (36.6 °C)-98.2 °F (36.8 °C)] 97.9 °F (36.6 °C)  Pulse:  [75-93] 93  Resp:  [16-20] 17  SpO2:  [95 %-99 %] 95 %  BP: (109-158)/(54-75) 158/73     Weight: 125.7 kg (277 lb 1.9 oz)  Body mass index is 44.73 kg/m².    Physical Exam  Vitals and nursing note reviewed.   Constitutional:        Appearance: She is well-developed. She is obese. She is ill-appearing. She is not toxic-appearing or diaphoretic.   HENT:      Head: Normocephalic and atraumatic.      Nose: Nose normal. No congestion.   Eyes:      General: No scleral icterus.     Pupils: Pupils are equal, round, and reactive to light.   Neck:      Thyroid: No thyromegaly.   Cardiovascular:      Rate and Rhythm: Normal rate and regular rhythm.      Heart sounds: No murmur heard.    No gallop.   Pulmonary:      Effort: Pulmonary effort is normal.      Breath sounds: Normal breath sounds. No stridor. No wheezing or rales.   Abdominal:      General: There is no distension.      Palpations: Abdomen is soft. There is no mass.      Tenderness: There is no abdominal tenderness. There is no guarding.   Musculoskeletal:         General: Swelling and tenderness present. No signs of injury. Normal range of motion.      Cervical back: Normal range of motion and neck supple. No rigidity.      Right lower leg: No edema.      Left lower leg: Edema present.   Lymphadenopathy:      Cervical: No cervical adenopathy.   Skin:     General: Skin is warm and dry.      Capillary Refill: Capillary refill takes less than 2 seconds.      Findings: Erythema present.   Neurological:      General: No focal deficit present.      Mental Status: She is alert and oriented to person, place, and time.      Cranial Nerves: No cranial nerve deficit.      Motor: No weakness.   Psychiatric:         Mood and Affect: Mood normal.         Behavior: Behavior normal.         CRANIAL NERVES     CN III, IV, VI   Pupils are equal, round, and reactive to light.     +2 PE on RLE and +1 PE on LLE    Recent Results (from the past 24 hour(s))   CBC Auto Differential    Collection Time: 05/11/22  4:41 AM   Result Value Ref Range    WBC 5.40 3.90 - 12.70 K/uL    RBC 3.05 (L) 4.00 - 5.40 M/uL    Hemoglobin 8.3 (L) 12.0 - 16.0 g/dL    Hematocrit 26.6 (L) 37.0 - 48.5 %    MCV 87 82 - 98 fL    MCH 27.2  27.0 - 31.0 pg    MCHC 31.2 (L) 32.0 - 36.0 g/dL    RDW 17.0 (H) 11.5 - 14.5 %    Platelets 167 150 - 450 K/uL    MPV 11.3 9.2 - 12.9 fL    Immature Granulocytes 0.6 (H) 0.0 - 0.5 %    Gran # (ANC) 3.4 1.8 - 7.7 K/uL    Immature Grans (Abs) 0.03 0.00 - 0.04 K/uL    Lymph # 1.0 1.0 - 4.8 K/uL    Mono # 0.6 0.3 - 1.0 K/uL    Eos # 0.4 0.0 - 0.5 K/uL    Baso # 0.03 0.00 - 0.20 K/uL    nRBC 0 0 /100 WBC    Gran % 63.2 38.0 - 73.0 %    Lymph % 18.0 18.0 - 48.0 %    Mono % 10.6 4.0 - 15.0 %    Eosinophil % 7.0 0.0 - 8.0 %    Basophil % 0.6 0.0 - 1.9 %    Differential Method Automated    Comprehensive Metabolic Panel    Collection Time: 05/11/22  4:41 AM   Result Value Ref Range    Sodium 136 136 - 145 mmol/L    Potassium 3.7 3.5 - 5.1 mmol/L    Chloride 100 95 - 110 mmol/L    CO2 26 23 - 29 mmol/L    Glucose 147 (H) 70 - 110 mg/dL    BUN 10 6 - 20 mg/dL    Creatinine 1.3 0.5 - 1.4 mg/dL    Calcium 8.6 (L) 8.7 - 10.5 mg/dL    Total Protein 6.0 6.0 - 8.4 g/dL    Albumin 2.9 (L) 3.5 - 5.2 g/dL    Total Bilirubin 0.6 0.1 - 1.0 mg/dL    Alkaline Phosphatase 101 55 - 135 U/L    AST 42 (H) 10 - 40 U/L    ALT 17 10 - 44 U/L    Anion Gap 10 8 - 16 mmol/L    eGFR if African American 52.3 (A) >60 mL/min/1.73 m^2    eGFR if non  45.3 (A) >60 mL/min/1.73 m^2   Magnesium    Collection Time: 05/11/22  4:41 AM   Result Value Ref Range    Magnesium 1.6 1.6 - 2.6 mg/dL   Vancomycin, random    Collection Time: 05/11/22  4:41 AM   Result Value Ref Range    Vancomycin, Random 14.7 Not established ug/mL       Microbiology Results (last 7 days)       Procedure Component Value Units Date/Time    Blood Culture #1 **CANNOT BE ORDERED STAT** [[id number]] Collected: 05/07/22 1246    Order Status: Completed Specimen: Blood from Peripheral, Antecubital, Left Updated: 05/10/22 1412     Blood Culture, Routine No Growth to date      No Growth to date      No Growth to date      No Growth to date    Blood Culture #2 **CANNOT BE ORDERED  STAT** [[id number]] Collected: 05/07/22 1258    Order Status: Completed Specimen: Blood from Peripheral, Antecubital, Right Updated: 05/10/22 1412     Blood Culture, Routine No Growth to date      No Growth to date      No Growth to date      No Growth to date             Imaging Results              X-Ray Knee 3 View Left (Final result)  Result time 05/08/22 01:13:57      Final result by Timur Sanabria MD (05/08/22 01:13:57)                   Impression:      As above.      Electronically signed by: Timur Sanabria MD  Date:    05/08/2022  Time:    01:13               Narrative:    EXAMINATION:  XR KNEE 3 VIEW LEFT    CLINICAL HISTORY:  postop;    TECHNIQUE:  AP, lateral, and Merchant views of the left knee were performed.    COMPARISON:  05/07/2022 left tibia and fibula.    FINDINGS:  Suboptimal positioning on the AP view with the knee appears to be in flexion.  Joint space is not adequately assessed.    No displaced fracture or dislocation.  Suboptimal positioning on AP view results in limited assessment of the tibiofemoral joint space and tibial plateau.  Suprapatellar effusion present.  Diffuse soft tissue edema present.                                        CT Thigh With Contrast Left (Final result)  Result time 05/07/22 19:04:28      Final result by Dre Real MD (05/07/22 19:04:28)                   Impression:      Findings concerning for left lower extremity cellulitis.    Knee joint effusion with some peripheral enhancement which could represent sterile effusion and nonspecific reactive synovitis; however, septic arthritis not excluded.  Additionally, there is a prepatellar small rim enhancing collection favored to represent nonspecific bursitis, although small abscess not excluded in the setting of surrounding cellulitis.  Clinical correlation advised.  Fluid sampling may be warranted.    Mild degenerative changes of the lower extremity as detailed above.    3.8 cm incidental left adnexal cyst.   For a patient of the this age group pelvic ultrasound follow-up in 6-12 months is recommended per ACR guidelines, or sooner if associated pain develops.    Suspected leiomyomatous uterus.  Further evaluation with elective/nonemergent pelvic ultrasound can be obtained as warranted.    Additional findings above.    This report was flagged in Epic as abnormal.    Electronically signed by resident: Ramon Hammonds  Date:    05/07/2022  Time:    18:26    Electronically signed by: Dre Real MD  Date:    05/07/2022  Time:    19:04               Narrative:    EXAMINATION:  CT THIGH WITH CONTRAST LEFT; CT LEG (TIBIA-FIBULA) WITH CONTRAST LEFT    CLINICAL HISTORY:  Soft tissue infection suspected, thigh, xray done;; Soft tissue infection suspected, lower leg, xray done;    TECHNIQUE:  Axial images performed through the iliac crests to the foot of the left lower extremity after administration of 100 mL of intravenous Omnipaque 350.  Coronal and sagittal reformations performed.    COMPARISON:  Lower extremity veins ultrasound 05/07/2022    FINDINGS:  The bones of the left lower extremity demonstrate mild degenerative changes of the left knee indicated by subchondral sclerosis, osteophytic formation, and mild joint space narrowing.  Degenerative changes of the 1st metatarsophalangeal joint is noted as well.  No acute fracture.  No osseous destructive lesions.  Small suprapatellar and femorotibial joint effusions with some peripheral enhancement.    The soft tissues of the left lower extremity demonstrate multiple areas of soft tissue attenuation with peripheral calcification in the subcutaneous soft tissues overlying the lateral left thigh, perhaps relating to remote trauma or injection medication use.  Left inguinal skin thickening, likely postsurgical related to prior left superficial femoral artery branch laceration repair.  There is left inguinal lymphadenopathy, for example lymph node measuring 1.2 cm in short axis  (series 301, image 845). Septation of the subcutaneous fat involving the left lower extremity mainly in the lower leg, indicating edema.  Mild skin thickening overlying the left lateral lower leg.  There is approximate 2.8 x 1.2 x 2.8 cm slightly heterogeneous collection within enhancing rim along the anterior mid to lower aspect of the patella.    Limited intrapelvic evaluation demonstrates enlargement of the uterus with punctate internal calcifications, likely leiomyomatous uterus.  3.8 cm left adnexal hypodensity most consistent with a cyst.                                        CT Leg (Tibia-Fibula) Wtih Contrast Left (Final result)  Result time 05/07/22 19:04:28      Final result by Dre Real MD (05/07/22 19:04:28)                   Impression:      Findings concerning for left lower extremity cellulitis.    Knee joint effusion with some peripheral enhancement which could represent sterile effusion and nonspecific reactive synovitis; however, septic arthritis not excluded.  Additionally, there is a prepatellar small rim enhancing collection favored to represent nonspecific bursitis, although small abscess not excluded in the setting of surrounding cellulitis.  Clinical correlation advised.  Fluid sampling may be warranted.    Mild degenerative changes of the lower extremity as detailed above.    3.8 cm incidental left adnexal cyst.  For a patient of the this age group pelvic ultrasound follow-up in 6-12 months is recommended per ACR guidelines, or sooner if associated pain develops.    Suspected leiomyomatous uterus.  Further evaluation with elective/nonemergent pelvic ultrasound can be obtained as warranted.    Additional findings above.    This report was flagged in Epic as abnormal.    Electronically signed by resident: Ramon Hammonds  Date:    05/07/2022  Time:    18:26    Electronically signed by: Dre Real MD  Date:    05/07/2022  Time:    19:04               Narrative:    EXAMINATION:  CT  THIGH WITH CONTRAST LEFT; CT LEG (TIBIA-FIBULA) WITH CONTRAST LEFT    CLINICAL HISTORY:  Soft tissue infection suspected, thigh, xray done;; Soft tissue infection suspected, lower leg, xray done;    TECHNIQUE:  Axial images performed through the iliac crests to the foot of the left lower extremity after administration of 100 mL of intravenous Omnipaque 350.  Coronal and sagittal reformations performed.    COMPARISON:  Lower extremity veins ultrasound 05/07/2022    FINDINGS:  The bones of the left lower extremity demonstrate mild degenerative changes of the left knee indicated by subchondral sclerosis, osteophytic formation, and mild joint space narrowing.  Degenerative changes of the 1st metatarsophalangeal joint is noted as well.  No acute fracture.  No osseous destructive lesions.  Small suprapatellar and femorotibial joint effusions with some peripheral enhancement.    The soft tissues of the left lower extremity demonstrate multiple areas of soft tissue attenuation with peripheral calcification in the subcutaneous soft tissues overlying the lateral left thigh, perhaps relating to remote trauma or injection medication use.  Left inguinal skin thickening, likely postsurgical related to prior left superficial femoral artery branch laceration repair.  There is left inguinal lymphadenopathy, for example lymph node measuring 1.2 cm in short axis (series 301, image 845). Septation of the subcutaneous fat involving the left lower extremity mainly in the lower leg, indicating edema.  Mild skin thickening overlying the left lateral lower leg.  There is approximate 2.8 x 1.2 x 2.8 cm slightly heterogeneous collection within enhancing rim along the anterior mid to lower aspect of the patella.    Limited intrapelvic evaluation demonstrates enlargement of the uterus with punctate internal calcifications, likely leiomyomatous uterus.  3.8 cm left adnexal hypodensity most consistent with a cyst.                                        US Lower Extremity Veins Left (Final result)  Result time 05/07/22 14:23:00      Final result by Dre Real MD (05/07/22 14:23:00)                   Impression:      No evidence of deep venous thrombosis in the left lower extremity.      Electronically signed by: Dre Real MD  Date:    05/07/2022  Time:    14:23               Narrative:    EXAMINATION:  US LOWER EXTREMITY VEINS LEFT    CLINICAL HISTORY:  Other specified soft tissue disorders    TECHNIQUE:  Duplex and color flow Doppler evaluation and graded compression of the left lower extremity veins was performed.    COMPARISON:  None    FINDINGS:  Left thigh veins: The common femoral, femoral, popliteal, upper greater saphenous, and deep femoral veins are patent and free of thrombus. The veins are normally compressible and have normal phasic flow and augmentation response.    Left calf veins: The visualized calf veins are patent.    Contralateral CFV: The contralateral (right) common femoral vein is patent and free of thrombus.    Miscellaneous: Nonspecific subcutaneous edema at the left calf region.                                       X-Ray Tibia Fibula 2 View Left (Final result)  Result time 05/07/22 13:57:38      Final result by Joshua Fritz MD (05/07/22 13:57:38)                   Impression:      As above.      Electronically signed by: Joshua Fritz  Date:    05/07/2022  Time:    13:57               Narrative:    EXAMINATION:  XR TIBIA FIBULA 2 VIEW LEFT    CLINICAL HISTORY:  Pain in leg, unspecified    TECHNIQUE:  AP and lateral views of the left tibia and fibula were performed.    COMPARISON:  None.    FINDINGS:  No acute fracture, dislocation or osseous destruction.  No radiopaque foreign body.  Large calcaneal enthesophyte at the distal Achilles insertion.  Partial visualized femorotibial degenerative change.                                            Assessment/Plan:      * Left leg cellulitis  Bursectomy  5/12/22      Cellulitis of extremity  3/4 SIRS with 101F, , RR 22, with normal WBC#.   She has lactic acidosis of 3.8 and .   CT showed knee joint effusion with some peripheral enhancement which could represent sterile effusion and nonspecific reactive synovitis; however, septic arthritis not excluded, additionally, there is a prepatellar small rim enhancing collection favored to represent nonspecific bursitis, although small abscess not excluded in the setting of surrounding cellulitis of LLE.   -Vanc and Zosyn  -blood cx's  -sepsis fluids, LA trend  -GenSx consulted to assess  -Will consult ortho to see if fluid collection (vs bursitis) should be tapped    Atrial Fibrillation (paroxysmal)  - S/p 2 cardioversion.  She is now in sinus.  - continue propafenone  - resume metoprolol.  Increase to home dose as tolerated.  - Restart apixaban after procedure    Volume overload  -Increasing to IV 80 mg BID      Iron deficiency anemia  Give iron after infection settles      Recurrent major depressive disorder, in partial remission  Continue duloxetine      Gastroesophageal reflux disease without esophagitis  Continue PPI      Pure hypercholesterolemia  Cont statin      ERENDIRA (obstructive sleep apnea)  Offer CPAP nightly      Type 2 diabetes mellitus with diabetic polyneuropathy, without long-term current use of insulin  Well controlled. A1c 5.7  Consider metoprolol at discharge if not on  Statin  Consider bariatric surgery      Morbid obesity with BMI of 45.0-49.9, adult  Body mass index is 46.48 kg/m². Morbid obesity complicates all aspects of disease management from diagnostic modalities to treatment. Weight loss encouraged and health benefits explained to patient.   -Given DM and Cardiac disease, consider bariatric surgery        Essential hypertension  Continue home meds:  Lisinopril metoprolol nifedipine      Opioid dependence in remission  Noted      VTE Risk Mitigation (From admission, onward)          Ordered     IP VTE HIGH RISK PATIENT  Once         05/07/22 1953     Place sequential compression device  Until discontinued         05/07/22 1953                Discharge Planning   RAYO: 5/13/2022     Code Status: Full Code   Is the patient medically ready for discharge?: No    Reason for patient still in hospital (select all that apply): Patient trending condition and Treatment  Discharge Plan A: Home Health, Home with family ([place]/ [facility] (would like reassigned if needed))                  Reed Zuleta MD  Department of Hospital Medicine   [facility] - Telemetry Stepdown ([place]

## 2022-05-11 NOTE — TELEPHONE ENCOUNTER
"----- Message from Kimberly Bagley sent at 5/11/2022 12:05 PM CDT -----  Regarding: Patient Advice            Name of Who is Calling:  SHAWNA GRAHAM    Who Left The Message:  SHAWNA GRAHAM      What is the request in detail:    Patient called requesting a call. Patient states,  "she's been admitted into the [facility]."    Please further advise.   Thank you!      Reply by [facility]:  NO      Preferred Call Back  :  [phone] (D)                                          "

## 2022-05-11 NOTE — ASSESSMENT & PLAN NOTE
57F with left knee and leg cellulitis, and likely left prepatellar septic bursitis. Septic bursitis still non responsive to IVAB.    Recs  - To surgery tomorrow for I and D left knee, bursectomy.  -IV abx per hospital medicine  -WBAT LLE  - Surgery tomorrow if not resolved, NPO midnightt

## 2022-05-11 NOTE — PROGRESS NOTES
[facility] - Telemetry Stepdown ([place] [address])  Orthopedics  Progress Note    Patient Name: Vicky Alvarado  MRN: [id number]  Admission Date: 5/7/2022  Hospital Length of Stay: 2 days  Attending Provider: Reed Zuleta MD  Primary Care Provider: Gordy Cordero MD  Follow-up For: Procedure(s) (LRB):  INCISION AND DRAINAGE, Prepatellar bursectomy. (Left)    Post-Operative Day:    Subjective:     Principal Problem:Left leg cellulitis    Principal Orthopedic Problem:  Prepatellar septic bursitis.    Interval History: Patient seen and examined at bedside.  Cellulitis has completely resolved except directly around the area of prepatellar bursitis. Septic prepatella bursitis unfortunately not responding to IVAB.      Review of patient's allergies indicates:   Allergen Reactions    Flecainide Shortness Of Breath and Swelling       Current Facility-Administered Medications   Medication    acetaminophen tablet 650 mg    ALPRAZolam tablet 0.5 mg    apixaban tablet 5 mg    atorvastatin tablet 40 mg    celecoxib capsule 100 mg    diphenhydrAMINE capsule 25 mg    DULoxetine DR capsule 120 mg    furosemide injection 80 mg    glucagon (human recombinant) injection 1 mg    glucose chewable tablet 16 g    glucose chewable tablet 24 g    HYDROmorphone injection 0.5 mg    medroxyPROGESTERone tablet 20 mg    melatonin tablet 6 mg    metoprolol succinate (TOPROL-XL) 24 hr split tablet 12.5 mg    miconazole NITRATE 2 % top powder    multivitamin tablet    naloxone 0.4 mg/mL injection 0.02 mg    NIFEdipine 24 hr tablet 30 mg    ondansetron disintegrating tablet 8 mg    oxyCODONE immediate release tablet 5 mg    pantoprazole EC tablet 40 mg    piperacillin-tazobactam 4.5 g in sodium chloride 0.9% 100 mL IVPB (ready to mix system)    polyethylene glycol packet 17 g    propafenone tablet 225 mg    traZODone tablet 50 mg    vancomycin - pharmacy to dose     Facility-Administered Medications Ordered in Other  "Encounters   Medication    sodium chloride 0.9% bolus 1,000 mL     Objective:     Vital Signs (Most Recent):  Temp: 98.1 °F (36.7 °C) (05/11/22 0359)  Pulse: 85 (05/11/22 0359)  Resp: 18 (05/11/22 0420)  BP: (!) 136/59 (05/11/22 0359)  SpO2: 95 % (05/11/22 0359)   Vital Signs (24h Range):  Temp:  [97.7 °F (36.5 °C)-98.1 °F (36.7 °C)] 98.1 °F (36.7 °C)  Pulse:  [80-85] 85  Resp:  [16-20] 18  SpO2:  [95 %-98 %] 95 %  BP: (109-136)/(54-75) 136/59     Weight: 125.7 kg (277 lb 1.9 oz)  Height: 5' 6" (167.6 cm)  Body mass index is 44.73 kg/m².    No intake or output data in the 24 hours ending 05/11/22 0638      Ortho/SPM Exam  Physical Exam:  NAD, A/O x 3.  She large amount of erythema around the knee that has visibly resolved.  She does have a small amount of fluid noted anterior aspect of the knee into some surrounding erythema.  Area proximally 3 x 3 cm.  Full passive, active range of motion of the knee without pain.  Is able to bear weight.      Significant Labs: All pertinent labs within the past 24 hours have been reviewed.    Significant Imaging: I have reviewed and interpreted all pertinent imaging results/findings.    Assessment/Plan:     * Left leg cellulitis  57F with left knee and leg cellulitis, and likely left prepatellar septic bursitis. Septic bursitis still non responsive to IVAB.    Recs  - To surgery tomorrow for I and D left knee, bursectomy.  -IV abx per hospital medicine  -WBAT LLE  - Surgery tomorrow if not resolved, NPO midnightt          Gabriel Kate MD  Orthopedics  [facility] - Tele[facility] Stepdown ([place]-[facility])  "

## 2022-05-12 ENCOUNTER — TELEPHONE (OUTPATIENT)
Dept: SPORTS MEDICINE | Facility: CLINIC | Age: 57
End: 2022-05-12
Payer: COMMERCIAL

## 2022-05-12 LAB
ALBUMIN SERPL BCP-MCNC: 2.8 G/DL (ref 3.5–5.2)
ALP SERPL-CCNC: 93 U/L (ref 55–135)
ALT SERPL W/O P-5'-P-CCNC: 18 U/L (ref 10–44)
ANION GAP SERPL CALC-SCNC: 12 MMOL/L (ref 8–16)
AST SERPL-CCNC: 46 U/L (ref 10–40)
BACTERIA BLD CULT: NORMAL
BACTERIA BLD CULT: NORMAL
BASOPHILS # BLD AUTO: 0.06 K/UL (ref 0–0.2)
BASOPHILS NFR BLD: 1 % (ref 0–1.9)
BILIRUB SERPL-MCNC: 0.5 MG/DL (ref 0.1–1)
BUN SERPL-MCNC: 7 MG/DL (ref 6–20)
CALCIUM SERPL-MCNC: 8.8 MG/DL (ref 8.7–10.5)
CHLORIDE SERPL-SCNC: 101 MMOL/L (ref 95–110)
CLINICAL INFO: NORMAL
CO2 SERPL-SCNC: 21 MMOL/L (ref 23–29)
CREAT SERPL-MCNC: 1.1 MG/DL (ref 0.5–1.4)
CYTO CVX: NORMAL
CYTOLOGIST CVX/VAG CYTO: NORMAL
CYTOLOGIST CVX/VAG CYTO: NORMAL
CYTOLOGY CMNT CVX/VAG CYTO-IMP: NORMAL
CYTOLOGY PAP THIN PREP EXPLANATION: NORMAL
DATE OF PREVIOUS PAP: NORMAL
DATE PREVIOUS BX: NO
DIFFERENTIAL METHOD: ABNORMAL
EOSINOPHIL # BLD AUTO: 0.4 K/UL (ref 0–0.5)
EOSINOPHIL NFR BLD: 6.7 % (ref 0–8)
ERYTHROCYTE [DISTWIDTH] IN BLOOD BY AUTOMATED COUNT: 17.2 % (ref 11.5–14.5)
EST. GFR  (AFRICAN AMERICAN): >60 ML/MIN/1.73 M^2
EST. GFR  (NON AFRICAN AMERICAN): 55.5 ML/MIN/1.73 M^2
GEN CATEG CVX/VAG CYTO-IMP: NORMAL
GLUCOSE SERPL-MCNC: 146 MG/DL (ref 70–110)
HCT VFR BLD AUTO: 28.5 % (ref 37–48.5)
HGB BLD-MCNC: 8.4 G/DL (ref 12–16)
HPV I/H RISK 4 DNA CVX QL NAA+PROBE: NOT DETECTED
IMM GRANULOCYTES # BLD AUTO: 0.1 K/UL (ref 0–0.04)
IMM GRANULOCYTES NFR BLD AUTO: 1.6 % (ref 0–0.5)
LMP START DATE: NORMAL
LYMPHOCYTES # BLD AUTO: 1 K/UL (ref 1–4.8)
LYMPHOCYTES NFR BLD: 15.3 % (ref 18–48)
MAGNESIUM SERPL-MCNC: 1.8 MG/DL (ref 1.6–2.6)
MCH RBC QN AUTO: 27.8 PG (ref 27–31)
MCHC RBC AUTO-ENTMCNC: 29.5 G/DL (ref 32–36)
MCV RBC AUTO: 94 FL (ref 82–98)
MICROORGANISM CVX/VAG CYTO: NORMAL
MONOCYTES # BLD AUTO: 0.8 K/UL (ref 0.3–1)
MONOCYTES NFR BLD: 11.9 % (ref 4–15)
NEUTROPHILS # BLD AUTO: 4 K/UL (ref 1.8–7.7)
NEUTROPHILS NFR BLD: 63.5 % (ref 38–73)
NRBC BLD-RTO: 0 /100 WBC
PATHOLOGIST CVX/VAG CYTO: NORMAL
PLATELET # BLD AUTO: 174 K/UL (ref 150–450)
PMV BLD AUTO: 11.1 FL (ref 9.2–12.9)
POTASSIUM SERPL-SCNC: 3.6 MMOL/L (ref 3.5–5.1)
PROT SERPL-MCNC: 6.3 G/DL (ref 6–8.4)
RBC # BLD AUTO: 3.02 M/UL (ref 4–5.4)
SERVICE CMNT-IMP: NORMAL
SODIUM SERPL-SCNC: 134 MMOL/L (ref 136–145)
SPECIMEN SOURCE CVX/VAG CYTO: NORMAL
STAT OF ADQ CVX/VAG CYTO-IMP: NORMAL
WBC # BLD AUTO: 6.28 K/UL (ref 3.9–12.7)

## 2022-05-12 PROCEDURE — 99233 SBSQ HOSP IP/OBS HIGH 50: CPT | Mod: ,,, | Performed by: STUDENT IN AN ORGANIZED HEALTH CARE EDUCATION/TRAINING PROGRAM

## 2022-05-12 PROCEDURE — 25000003 PHARM REV CODE 250: Performed by: INTERNAL MEDICINE

## 2022-05-12 PROCEDURE — 99233 PR SUBSEQUENT HOSPITAL CARE,LEVL III: ICD-10-PCS | Mod: ,,, | Performed by: STUDENT IN AN ORGANIZED HEALTH CARE EDUCATION/TRAINING PROGRAM

## 2022-05-12 PROCEDURE — 99499 UNLISTED E&M SERVICE: CPT | Mod: ,,, | Performed by: STUDENT IN AN ORGANIZED HEALTH CARE EDUCATION/TRAINING PROGRAM

## 2022-05-12 PROCEDURE — 20600001 HC STEP DOWN PRIVATE ROOM

## 2022-05-12 PROCEDURE — 80053 COMPREHEN METABOLIC PANEL: CPT | Performed by: STUDENT IN AN ORGANIZED HEALTH CARE EDUCATION/TRAINING PROGRAM

## 2022-05-12 PROCEDURE — 63600175 PHARM REV CODE 636 W HCPCS: Performed by: STUDENT IN AN ORGANIZED HEALTH CARE EDUCATION/TRAINING PROGRAM

## 2022-05-12 PROCEDURE — 99499 NO LOS: ICD-10-PCS | Mod: ,,, | Performed by: STUDENT IN AN ORGANIZED HEALTH CARE EDUCATION/TRAINING PROGRAM

## 2022-05-12 PROCEDURE — 36415 COLL VENOUS BLD VENIPUNCTURE: CPT | Performed by: STUDENT IN AN ORGANIZED HEALTH CARE EDUCATION/TRAINING PROGRAM

## 2022-05-12 PROCEDURE — 85025 COMPLETE CBC W/AUTO DIFF WBC: CPT | Performed by: STUDENT IN AN ORGANIZED HEALTH CARE EDUCATION/TRAINING PROGRAM

## 2022-05-12 PROCEDURE — 83735 ASSAY OF MAGNESIUM: CPT | Performed by: STUDENT IN AN ORGANIZED HEALTH CARE EDUCATION/TRAINING PROGRAM

## 2022-05-12 PROCEDURE — 25000003 PHARM REV CODE 250: Performed by: STUDENT IN AN ORGANIZED HEALTH CARE EDUCATION/TRAINING PROGRAM

## 2022-05-12 RX ADMIN — HYDROMORPHONE HYDROCHLORIDE 1 MG: 1 INJECTION, SOLUTION INTRAMUSCULAR; INTRAVENOUS; SUBCUTANEOUS at 10:05

## 2022-05-12 RX ADMIN — PIPERACILLIN SODIUM AND TAZOBACTAM SODIUM 4.5 G: 4; .5 INJECTION, POWDER, LYOPHILIZED, FOR SOLUTION INTRAVENOUS at 02:05

## 2022-05-12 RX ADMIN — VANCOMYCIN HYDROCHLORIDE 1250 MG: 1.25 INJECTION, POWDER, LYOPHILIZED, FOR SOLUTION INTRAVENOUS at 10:05

## 2022-05-12 RX ADMIN — CELECOXIB 100 MG: 100 CAPSULE ORAL at 08:05

## 2022-05-12 RX ADMIN — PROPAFENONE HYDROCHLORIDE 225 MG: 150 TABLET, FILM COATED ORAL at 06:05

## 2022-05-12 RX ADMIN — HYDROMORPHONE HYDROCHLORIDE 1 MG: 1 INJECTION, SOLUTION INTRAMUSCULAR; INTRAVENOUS; SUBCUTANEOUS at 06:05

## 2022-05-12 RX ADMIN — MEDROXYPROGESTERONE ACETATE 20 MG: 10 TABLET ORAL at 08:05

## 2022-05-12 RX ADMIN — ALPRAZOLAM 0.5 MG: 0.25 TABLET ORAL at 08:05

## 2022-05-12 RX ADMIN — HYDROMORPHONE HYDROCHLORIDE 1 MG: 1 INJECTION, SOLUTION INTRAMUSCULAR; INTRAVENOUS; SUBCUTANEOUS at 08:05

## 2022-05-12 RX ADMIN — METOPROLOL SUCCINATE 12.5 MG: 25 TABLET, EXTENDED RELEASE ORAL at 08:05

## 2022-05-12 RX ADMIN — THERA TABS 1 TABLET: TAB at 08:05

## 2022-05-12 RX ADMIN — PIPERACILLIN SODIUM AND TAZOBACTAM SODIUM 4.5 G: 4; .5 INJECTION, POWDER, LYOPHILIZED, FOR SOLUTION INTRAVENOUS at 08:05

## 2022-05-12 RX ADMIN — MICONAZOLE NITRATE 2 % TOPICAL POWDER: at 08:05

## 2022-05-12 RX ADMIN — FUROSEMIDE 80 MG: 10 INJECTION, SOLUTION INTRAMUSCULAR; INTRAVENOUS at 06:05

## 2022-05-12 RX ADMIN — PIPERACILLIN SODIUM AND TAZOBACTAM SODIUM 4.5 G: 4; .5 INJECTION, POWDER, LYOPHILIZED, FOR SOLUTION INTRAVENOUS at 06:05

## 2022-05-12 RX ADMIN — PROPAFENONE HYDROCHLORIDE 225 MG: 150 TABLET, FILM COATED ORAL at 09:05

## 2022-05-12 RX ADMIN — MELATONIN TAB 3 MG 6 MG: 3 TAB at 08:05

## 2022-05-12 RX ADMIN — PROPAFENONE HYDROCHLORIDE 225 MG: 150 TABLET, FILM COATED ORAL at 02:05

## 2022-05-12 RX ADMIN — ATORVASTATIN CALCIUM 40 MG: 40 TABLET, FILM COATED ORAL at 08:05

## 2022-05-12 RX ADMIN — MICONAZOLE NITRATE 2 % TOPICAL POWDER: at 09:05

## 2022-05-12 RX ADMIN — DULOXETINE 120 MG: 60 CAPSULE, DELAYED RELEASE ORAL at 08:05

## 2022-05-12 RX ADMIN — FUROSEMIDE 80 MG: 10 INJECTION, SOLUTION INTRAMUSCULAR; INTRAVENOUS at 05:05

## 2022-05-12 RX ADMIN — HYDROMORPHONE HYDROCHLORIDE 1 MG: 1 INJECTION, SOLUTION INTRAMUSCULAR; INTRAVENOUS; SUBCUTANEOUS at 02:05

## 2022-05-12 RX ADMIN — NIFEDIPINE 30 MG: 30 TABLET, FILM COATED, EXTENDED RELEASE ORAL at 08:05

## 2022-05-12 RX ADMIN — MEDROXYPROGESTERONE ACETATE 20 MG: 10 TABLET ORAL at 02:05

## 2022-05-12 RX ADMIN — DIPHENHYDRAMINE HYDROCHLORIDE 25 MG: 25 CAPSULE ORAL at 12:05

## 2022-05-12 RX ADMIN — PANTOPRAZOLE SODIUM 40 MG: 40 TABLET, DELAYED RELEASE ORAL at 08:05

## 2022-05-12 NOTE — PLAN OF CARE
Plan of care reviewed with patient. Patient sitting up in bed. Resp even and unlabored. No acute resp distress noted at this time. Medications given as prescribed. Patient made aware that Dr. Gabriel Kate (ortho) says that surgery is not an option and that she would follow up outpatient. No needs made known at this time. Bed in low position. Call light within reach.       Problem: Adult Inpatient Plan of Care  Goal: Plan of Care Review  Outcome: Ongoing, Progressing  Goal: Patient-Specific Goal (Individualized)  Outcome: Ongoing, Progressing  Goal: Absence of Hospital-Acquired Illness or Injury  Outcome: Ongoing, Progressing  Goal: Optimal Comfort and Wellbeing  Outcome: Ongoing, Progressing  Goal: Readiness for Transition of Care  Outcome: Ongoing, Progressing     Problem: Bariatric Environmental Safety  Goal: Safety Maintained with Care  Outcome: Ongoing, Progressing     Problem: Diabetes Comorbidity  Goal: Blood Glucose Level Within Targeted Range  Outcome: Ongoing, Progressing     Problem: Fall Injury Risk  Goal: Absence of Fall and Fall-Related Injury  Outcome: Ongoing, Progressing

## 2022-05-12 NOTE — ASSESSMENT & PLAN NOTE
57F with left knee and leg cellulitis, and likely left prepatellar septic bursitis. Septic bursitis still not completely resolved    Recs  - Improving clinically will cancel case as her knee is looking beter. No septic arthritis. Please DC to home today with oral bactrim for 1 week. Will arrange clinic f/u in 1 week and can schedule as an outpatient if necessary.

## 2022-05-12 NOTE — PHYSICIAN QUERY
PT Name: Vicky Alvarado  MR #: [id number]     DOCUMENTATION CLARIFICATION     CDS/: Destiny Ellis RN              Contact information: [email or web address]  This form is a permanent document in the medical record.     Query Date: May 12, 2022    By submitting this query, we are merely seeking further clarification of documentation.  Please utilize your independent clinical judgment when addressing the question(s) below.  The Medical Record contains the following:  Indicators Supporting Clinical Findings Location in Medical Record   x HR     RR     BP   Temp HR= 92, 98, 105, 96  RR= 23, 22, 24, 24, 25  BP= 95/46, 92/53, 97/50  Temp= 98.4   5/7 FLowsheet   x Lactic Acid          Procalcitonin Lactic acid= 3.8, 2.6 5/7-8 Lab     x WBC      Bands     CRP  WBC= 5.12- 6.28 5/8- 5/12 Lab      Culture(s)      AMS, Confusion, LOC, etc.      Organ Dysfunction/Failure     x Bacteremia or Sepsis / Septic Cellulitis of extremity--- 3/4 SIRS with 101F, , RR 22, with normal WBC#.   She has lactic acidosis of 3.8 and     Sepsis fluids, LA trend   5/7 HP       x Known or Suspected Source of Infection documented Let leg cellulitis       (Failed) Outpatient Treatment     x Medication Piperacillin IVPB 5/7-11  LR 2640mL bolus 5/7  Vancomycin IVPB 5/7-10 MAR    Treatment     x Other CT showed knee joint effusion with some peripheral enhancement which could represent sterile effusion and nonspecific reactive synovitis; however, septic arthritis not excluded, additionally, there is a prepatellar small rim enhancing collection favored to represent nonspecific bursitis, although small abscess not excluded in the setting of surrounding cellulitis of LLE 5/11 Hosp Med MD PN        Provider, please specify diagnosis or diagnoses associated with above clinical findings.    [  x ] Sepsis      [   ] SIRS with infectious etiology     [   ] Other Infectious Disease (please specify): __________     [   ]  Sepsis Ruled Out     [  ] Clinically Undetermined         Please document in your progress notes daily for the duration of treatment until resolved and include in your discharge summary.

## 2022-05-12 NOTE — SUBJECTIVE & OBJECTIVE
Principal Problem:Left leg cellulitis    Principal Orthopedic Problem:  Prepatellar septic bursitis.    Interval History: Patient seen and examined at bedside.  Cellulitis has completely resolved except directly around the area of prepatellar bursitis. Septic prepatella bursitis still present but improving.     Review of patient's allergies indicates:   Allergen Reactions    Flecainide Shortness Of Breath and Swelling       Current Facility-Administered Medications   Medication    acetaminophen tablet 650 mg    ALPRAZolam tablet 0.5 mg    atorvastatin tablet 40 mg    celecoxib capsule 100 mg    diphenhydrAMINE capsule 25 mg    DULoxetine DR capsule 120 mg    furosemide injection 80 mg    glucagon (human recombinant) injection 1 mg    glucose chewable tablet 16 g    glucose chewable tablet 24 g    HYDROmorphone injection 1 mg    medroxyPROGESTERone tablet 20 mg    melatonin tablet 6 mg    metoprolol succinate (TOPROL-XL) 24 hr split tablet 12.5 mg    miconazole NITRATE 2 % top powder    multivitamin tablet    naloxone 0.4 mg/mL injection 0.02 mg    NIFEdipine 24 hr tablet 30 mg    ondansetron disintegrating tablet 8 mg    oxyCODONE immediate release tablet 5 mg    pantoprazole EC tablet 40 mg    piperacillin-tazobactam 4.5 g in sodium chloride 0.9% 100 mL IVPB (ready to mix system)    polyethylene glycol packet 17 g    propafenone tablet 225 mg    traZODone tablet 50 mg    vancomycin - pharmacy to dose    vancomycin 1.25 g in dextrose 5% 250 mL IVPB (ready to mix)     Facility-Administered Medications Ordered in Other Encounters   Medication    sodium chloride 0.9% bolus 1,000 mL     Objective:     Vital Signs (Most Recent):  Temp: 98 °F (36.7 °C) (05/12/22 0349)  Pulse: 86 (05/12/22 0349)  Resp: 20 (05/12/22 0349)  BP: 136/63 (05/12/22 0349)  SpO2: 98 % (05/12/22 0349)   Vital Signs (24h Range):  Temp:  [97.9 °F (36.6 °C)-98.5 °F (36.9 °C)] 98 °F (36.7 °C)  Pulse:  [75-93] 86  Resp:  [17-20] 20  SpO2:  [95 %-99 %]  "98 %  BP: (105-158)/(51-73) 136/63     Weight: 125.7 kg (277 lb 1.9 oz)  Height: 5' 6" (167.6 cm)  Body mass index is 44.73 kg/m².      Intake/Output Summary (Last 24 hours) at 5/12/2022 0607  Last data filed at 5/12/2022 0500  Gross per 24 hour   Intake --   Output 3 ml   Net -3 ml         Ortho/SPM Exam  Physical Exam:  NAD, A/O x 3.  She large amount of erythema around the knee that has visibly resolved.  She does have a small amount of fluid noted anterior aspect of the knee into some surrounding erythema.  Area proximally 3 x 3 cm.  Full passive, active range of motion of the knee without pain.  Is able to bear weight.      Significant Labs: All pertinent labs within the past 24 hours have been reviewed.    Significant Imaging: I have reviewed and interpreted all pertinent imaging results/findings.  "

## 2022-05-12 NOTE — PROGRESS NOTES
[facility] - Telemetry Stepdown ([place] [address])  Orthopedics  Progress Note    Patient Name: Vicky Alvarado  MRN: [id number]  Admission Date: 5/7/2022  Hospital Length of Stay: 3 days  Attending Provider: Reed Zuleta MD  Primary Care Provider: Gordy Cordero MD  Follow-up For: Procedure(s) (LRB):  INCISION AND DRAINAGE, Prepatellar bursectomy. (Left)    Post-Operative Day: Day of Surgery  Subjective:     Principal Problem:Left leg cellulitis    Principal Orthopedic Problem:  Prepatellar septic bursitis.    Interval History: Patient seen and examined at bedside.  Cellulitis has completely resolved except directly around the area of prepatellar bursitis. Septic prepatella bursitis still present but improving.     Review of patient's allergies indicates:   Allergen Reactions    Flecainide Shortness Of Breath and Swelling       Current Facility-Administered Medications   Medication    acetaminophen tablet 650 mg    ALPRAZolam tablet 0.5 mg    atorvastatin tablet 40 mg    celecoxib capsule 100 mg    diphenhydrAMINE capsule 25 mg    DULoxetine DR capsule 120 mg    furosemide injection 80 mg    glucagon (human recombinant) injection 1 mg    glucose chewable tablet 16 g    glucose chewable tablet 24 g    HYDROmorphone injection 1 mg    medroxyPROGESTERone tablet 20 mg    melatonin tablet 6 mg    metoprolol succinate (TOPROL-XL) 24 hr split tablet 12.5 mg    miconazole NITRATE 2 % top powder    multivitamin tablet    naloxone 0.4 mg/mL injection 0.02 mg    NIFEdipine 24 hr tablet 30 mg    ondansetron disintegrating tablet 8 mg    oxyCODONE immediate release tablet 5 mg    pantoprazole EC tablet 40 mg    piperacillin-tazobactam 4.5 g in sodium chloride 0.9% 100 mL IVPB (ready to mix system)    polyethylene glycol packet 17 g    propafenone tablet 225 mg    traZODone tablet 50 mg    vancomycin - pharmacy to dose    vancomycin 1.25 g in dextrose 5% 250 mL IVPB (ready to mix)  "    Facility-Administered Medications Ordered in Other Encounters   Medication    sodium chloride 0.9% bolus 1,000 mL     Objective:     Vital Signs (Most Recent):  Temp: 98 °F (36.7 °C) (05/12/22 0349)  Pulse: 86 (05/12/22 0349)  Resp: 20 (05/12/22 0349)  BP: 136/63 (05/12/22 0349)  SpO2: 98 % (05/12/22 0349)   Vital Signs (24h Range):  Temp:  [97.9 °F (36.6 °C)-98.5 °F (36.9 °C)] 98 °F (36.7 °C)  Pulse:  [75-93] 86  Resp:  [17-20] 20  SpO2:  [95 %-99 %] 98 %  BP: (105-158)/(51-73) 136/63     Weight: 125.7 kg (277 lb 1.9 oz)  Height: 5' 6" (167.6 cm)  Body mass index is 44.73 kg/m².      Intake/Output Summary (Last 24 hours) at 5/12/2022 0607  Last data filed at 5/12/2022 0500  Gross per 24 hour   Intake --   Output 3 ml   Net -3 ml         Ortho/SPM Exam  Physical Exam:  NAD, A/O x 3.  She large amount of erythema around the knee that has visibly resolved.  She does have a small amount of fluid noted anterior aspect of the knee into some surrounding erythema.  Area proximally 3 x 3 cm.  Full passive, active range of motion of the knee without pain.  Is able to bear weight.      Significant Labs: All pertinent labs within the past 24 hours have been reviewed.    Significant Imaging: I have reviewed and interpreted all pertinent imaging results/findings.    Assessment/Plan:     * Left leg cellulitis  57F with left knee and leg cellulitis, and likely left prepatellar septic bursitis. Septic bursitis still not completely resolved    Recs  - Improving clinically will cancel case as her knee is looking beter. No septic arthritis. Please DC to home today with oral bactrim for 1 week. Will arrange clinic f/u in 1 week and can schedule as an outpatient if necessary.           Gabriel Kate MD  Orthopedics  [facility] - Telemetry Stepdown ([place]-[facility])  "

## 2022-05-12 NOTE — PHYSICIAN QUERY
PT Name: Vicky Alvarado  MR #: [id number]     DOCUMENTATION CLARIFICATION     CDS/: Destiny Ellis RN              Contact information: [email or web address]  This form is a permanent document in the medical record.     Query Date: May 12, 2022    By submitting this query, we are merely seeking further clarification of documentation.  Please utilize your independent clinical judgment when addressing the question(s) below.    The Medical Record contains the following   Indicators Supporting Clinical Findings Location in Medical Record   x Heart Failure documented PMH:  CHF    Volume overload  -Increasing to IV 80 mg BID 5/11 Hosp MEd MD PN      BNP     x EF/Echo The estimated ejection fraction is 60%.  Normal left ventricular diastolic function.     6/30/21 ECHO    Radiology findings      Subjective/Objective Respiratory Conditions      Recent/Current MI      Heart Transplant, LVAD     x Edema, JVD Significant pedal edema bilaterally 5/10 Hosp Med MD PN      Ascites     x Diuretics/Meds Lasix 40mg IVP x 5 doses   Lasix 80mg IVP x 2 doses   Metoprolol 12.5mg PO   Metoprolol 50mg PO     Current outpatient medications:  Lasix 40mg PO   Metoprolol 50mg PO   5/8-5/10 MAR  5/10-5/11 MAR  5/9-5/10 MAR  5/8-5/10 MAR      5/7 Ortho MD PN      Other Treatment      Other       Heart failure is a clinical diagnosis which includes symptomatic fluid retention, elevated intracardiac pressures, and/or the inability of the heart to deliver adequate blood flow.    Heart Failure with reduced Ejection Fraction (HFrEF) or Systolic Heart Failure (loses ability to contract normally, EF is <40%)    Heart Failure with preserved Ejection Fraction (HFpEF) or Diastolic Heart Failure (stiff ventricles, does not relax properly, EF is >50%)     Heart Failure with Combined Systolic and Diastolic Failure (stiff ventricles, does not relax properly and EF is <50%)    Mid-range or mildly reduced ejection fraction (HFmrEF) is  classified as systolic heart failure.   Common clues to acute exacerbation:  Rapidly progressive symptoms (w/in 2 weeks of presentation), using IV diuretics, using supplemental O2, pulmonary edema on Xray, new or worsening pleural effusion, +JVD or other signs of volume overload, MI w/in 4 weeks, and/or BNP >500  The clinical guidelines noted are only system guidelines, and do not replace the providers clinical judgment.    Provider- If possible, please clarify Acuity and Type of CHF:    [   ]  Chronic Diastolic Heart Failure (HFpEF)   - preexisting and stable     [   ]  Other (please specify): ___________________________________     [ x ]  Clinically Undetermined                       Please document in your progress notes daily for the duration of treatment until resolved and include in your discharge summary.    References:  American Heart Association editorial staff. (2017, May). Ejection Fraction Heart Failure Measurement. American Heart Association. https://www.heart.org/en/health-topics/heart-failure/diagnosing-heart-failure/ejection-fraction-heart-failure-measurement#:~:text=Ejection%20fraction%20(EF)%20is%20a,pushed%20out%20with%20each%20heartbeat  [employer] (2020, December 15). Heart failure with preserved ejection fraction: Clinical manifestations and diagnosis. [employer]. https://www.[employer].com/contents/heart-failure-with-preserved-ejection-fraction-clinical-manifestations-and-diagnosis.  ICD-10-CM/PCS Coding Clinic Third Quarter ICD-10, Effective with discharges: September 8, 2020 [facility] § Heart failure with mid-range or mildly reduced ejection fraction (2020).  Form No. 19364

## 2022-05-12 NOTE — TELEPHONE ENCOUNTER
----- Message from Gabriel Kate MD sent at 5/12/2022  8:56 AM CDT -----  Pleaser make appt with janiya in 1 week for hospital f/u

## 2022-05-12 NOTE — NURSING
Patient rested quiety during the night.  C/o LLE pain is manage by PRN dilaudid and oxycodone. Patient is on IV antibiotic therapy. LLE decrease redness and swelling noted. Remain afebrile. VSS. Sister at bedside. No acute distress noted. Call light in reach

## 2022-05-13 LAB
ALBUMIN SERPL BCP-MCNC: 3.4 G/DL (ref 3.5–5.2)
ALP SERPL-CCNC: 113 U/L (ref 55–135)
ALT SERPL W/O P-5'-P-CCNC: 24 U/L (ref 10–44)
ANION GAP SERPL CALC-SCNC: 15 MMOL/L (ref 8–16)
ANISOCYTOSIS BLD QL SMEAR: SLIGHT
AST SERPL-CCNC: 66 U/L (ref 10–40)
BASOPHILS NFR BLD: 0 % (ref 0–1.9)
BILIRUB SERPL-MCNC: 0.7 MG/DL (ref 0.1–1)
BUN SERPL-MCNC: 7 MG/DL (ref 6–20)
CALCIUM SERPL-MCNC: 9 MG/DL (ref 8.7–10.5)
CHLORIDE SERPL-SCNC: 96 MMOL/L (ref 95–110)
CO2 SERPL-SCNC: 21 MMOL/L (ref 23–29)
CREAT SERPL-MCNC: 1.1 MG/DL (ref 0.5–1.4)
DIFFERENTIAL METHOD: ABNORMAL
EOSINOPHIL NFR BLD: 5 % (ref 0–8)
ERYTHROCYTE [DISTWIDTH] IN BLOOD BY AUTOMATED COUNT: 17 % (ref 11.5–14.5)
EST. GFR  (AFRICAN AMERICAN): >60 ML/MIN/1.73 M^2
EST. GFR  (NON AFRICAN AMERICAN): 55.5 ML/MIN/1.73 M^2
GLUCOSE SERPL-MCNC: 155 MG/DL (ref 70–110)
HCT VFR BLD AUTO: 26.6 % (ref 37–48.5)
HGB BLD-MCNC: 8.4 G/DL (ref 12–16)
IMM GRANULOCYTES # BLD AUTO: ABNORMAL K/UL (ref 0–0.04)
IMM GRANULOCYTES NFR BLD AUTO: ABNORMAL % (ref 0–0.5)
LYMPHOCYTES NFR BLD: 13 % (ref 18–48)
MAGNESIUM SERPL-MCNC: 1.7 MG/DL (ref 1.6–2.6)
MCH RBC QN AUTO: 27.5 PG (ref 27–31)
MCHC RBC AUTO-ENTMCNC: 31.6 G/DL (ref 32–36)
MCV RBC AUTO: 87 FL (ref 82–98)
MONOCYTES NFR BLD: 3 % (ref 4–15)
NEUTROPHILS NFR BLD: 79 % (ref 38–73)
NRBC BLD-RTO: 0 /100 WBC
OVALOCYTES BLD QL SMEAR: ABNORMAL
PLATELET # BLD AUTO: 172 K/UL (ref 150–450)
PMV BLD AUTO: 11.3 FL (ref 9.2–12.9)
POIKILOCYTOSIS BLD QL SMEAR: SLIGHT
POLYCHROMASIA BLD QL SMEAR: ABNORMAL
POTASSIUM SERPL-SCNC: 3.7 MMOL/L (ref 3.5–5.1)
PROT SERPL-MCNC: 7.7 G/DL (ref 6–8.4)
RBC # BLD AUTO: 3.05 M/UL (ref 4–5.4)
SODIUM SERPL-SCNC: 132 MMOL/L (ref 136–145)
WBC # BLD AUTO: 8.08 K/UL (ref 3.9–12.7)

## 2022-05-13 PROCEDURE — 20600001 HC STEP DOWN PRIVATE ROOM

## 2022-05-13 PROCEDURE — 85007 BL SMEAR W/DIFF WBC COUNT: CPT | Performed by: STUDENT IN AN ORGANIZED HEALTH CARE EDUCATION/TRAINING PROGRAM

## 2022-05-13 PROCEDURE — 25000003 PHARM REV CODE 250: Performed by: STUDENT IN AN ORGANIZED HEALTH CARE EDUCATION/TRAINING PROGRAM

## 2022-05-13 PROCEDURE — 99233 SBSQ HOSP IP/OBS HIGH 50: CPT | Mod: ,,, | Performed by: STUDENT IN AN ORGANIZED HEALTH CARE EDUCATION/TRAINING PROGRAM

## 2022-05-13 PROCEDURE — 80053 COMPREHEN METABOLIC PANEL: CPT | Performed by: STUDENT IN AN ORGANIZED HEALTH CARE EDUCATION/TRAINING PROGRAM

## 2022-05-13 PROCEDURE — 99233 PR SUBSEQUENT HOSPITAL CARE,LEVL III: ICD-10-PCS | Mod: ,,, | Performed by: STUDENT IN AN ORGANIZED HEALTH CARE EDUCATION/TRAINING PROGRAM

## 2022-05-13 PROCEDURE — 63600175 PHARM REV CODE 636 W HCPCS: Performed by: STUDENT IN AN ORGANIZED HEALTH CARE EDUCATION/TRAINING PROGRAM

## 2022-05-13 PROCEDURE — 83735 ASSAY OF MAGNESIUM: CPT | Performed by: STUDENT IN AN ORGANIZED HEALTH CARE EDUCATION/TRAINING PROGRAM

## 2022-05-13 PROCEDURE — 85027 COMPLETE CBC AUTOMATED: CPT | Performed by: STUDENT IN AN ORGANIZED HEALTH CARE EDUCATION/TRAINING PROGRAM

## 2022-05-13 PROCEDURE — 25000003 PHARM REV CODE 250: Performed by: INTERNAL MEDICINE

## 2022-05-13 PROCEDURE — 36415 COLL VENOUS BLD VENIPUNCTURE: CPT | Performed by: STUDENT IN AN ORGANIZED HEALTH CARE EDUCATION/TRAINING PROGRAM

## 2022-05-13 RX ORDER — SULFAMETHOXAZOLE AND TRIMETHOPRIM 800; 160 MG/1; MG/1
1 TABLET ORAL 2 TIMES DAILY
Status: DISCONTINUED | OUTPATIENT
Start: 2022-05-13 | End: 2022-05-14 | Stop reason: HOSPADM

## 2022-05-13 RX ORDER — FUROSEMIDE 40 MG/1
40 TABLET ORAL 2 TIMES DAILY
Status: DISCONTINUED | OUTPATIENT
Start: 2022-05-14 | End: 2022-05-14 | Stop reason: HOSPADM

## 2022-05-13 RX ADMIN — THERA TABS 1 TABLET: TAB at 09:05

## 2022-05-13 RX ADMIN — FUROSEMIDE 80 MG: 10 INJECTION, SOLUTION INTRAMUSCULAR; INTRAVENOUS at 05:05

## 2022-05-13 RX ADMIN — CELECOXIB 100 MG: 100 CAPSULE ORAL at 09:05

## 2022-05-13 RX ADMIN — APIXABAN 5 MG: 5 TABLET, FILM COATED ORAL at 09:05

## 2022-05-13 RX ADMIN — MICONAZOLE NITRATE 2 % TOPICAL POWDER: at 09:05

## 2022-05-13 RX ADMIN — METOPROLOL SUCCINATE 12.5 MG: 25 TABLET, EXTENDED RELEASE ORAL at 09:05

## 2022-05-13 RX ADMIN — NIFEDIPINE 30 MG: 30 TABLET, FILM COATED, EXTENDED RELEASE ORAL at 09:05

## 2022-05-13 RX ADMIN — HYDROMORPHONE HYDROCHLORIDE 1 MG: 1 INJECTION, SOLUTION INTRAMUSCULAR; INTRAVENOUS; SUBCUTANEOUS at 06:05

## 2022-05-13 RX ADMIN — PROPAFENONE HYDROCHLORIDE 225 MG: 150 TABLET, FILM COATED ORAL at 09:05

## 2022-05-13 RX ADMIN — PIPERACILLIN SODIUM AND TAZOBACTAM SODIUM 4.5 G: 4; .5 INJECTION, POWDER, LYOPHILIZED, FOR SOLUTION INTRAVENOUS at 05:05

## 2022-05-13 RX ADMIN — HYDROMORPHONE HYDROCHLORIDE 1 MG: 1 INJECTION, SOLUTION INTRAMUSCULAR; INTRAVENOUS; SUBCUTANEOUS at 01:05

## 2022-05-13 RX ADMIN — PIPERACILLIN SODIUM AND TAZOBACTAM SODIUM 4.5 G: 4; .5 INJECTION, POWDER, LYOPHILIZED, FOR SOLUTION INTRAVENOUS at 02:05

## 2022-05-13 RX ADMIN — MELATONIN TAB 3 MG 6 MG: 3 TAB at 09:05

## 2022-05-13 RX ADMIN — MEDROXYPROGESTERONE ACETATE 20 MG: 10 TABLET ORAL at 09:05

## 2022-05-13 RX ADMIN — DULOXETINE 120 MG: 60 CAPSULE, DELAYED RELEASE ORAL at 09:05

## 2022-05-13 RX ADMIN — FUROSEMIDE 80 MG: 10 INJECTION, SOLUTION INTRAMUSCULAR; INTRAVENOUS at 06:05

## 2022-05-13 RX ADMIN — HYDROMORPHONE HYDROCHLORIDE 1 MG: 1 INJECTION, SOLUTION INTRAMUSCULAR; INTRAVENOUS; SUBCUTANEOUS at 02:05

## 2022-05-13 RX ADMIN — PROPAFENONE HYDROCHLORIDE 225 MG: 150 TABLET, FILM COATED ORAL at 02:05

## 2022-05-13 RX ADMIN — PANTOPRAZOLE SODIUM 40 MG: 40 TABLET, DELAYED RELEASE ORAL at 09:05

## 2022-05-13 RX ADMIN — SULFAMETHOXAZOLE AND TRIMETHOPRIM 1 TABLET: 800; 160 TABLET ORAL at 09:05

## 2022-05-13 RX ADMIN — MEDROXYPROGESTERONE ACETATE 20 MG: 10 TABLET ORAL at 02:05

## 2022-05-13 RX ADMIN — ATORVASTATIN CALCIUM 40 MG: 40 TABLET, FILM COATED ORAL at 09:05

## 2022-05-13 RX ADMIN — HYDROMORPHONE HYDROCHLORIDE 1 MG: 1 INJECTION, SOLUTION INTRAMUSCULAR; INTRAVENOUS; SUBCUTANEOUS at 09:05

## 2022-05-13 RX ADMIN — TRAZODONE HYDROCHLORIDE 50 MG: 50 TABLET ORAL at 01:05

## 2022-05-13 RX ADMIN — PROPAFENONE HYDROCHLORIDE 225 MG: 150 TABLET, FILM COATED ORAL at 05:05

## 2022-05-13 NOTE — NURSING
Pt believes she is allergic to vancomycin after getting hives on right arm (IV infusing on right arm) during last dose. She verbalized that she does not want to take it anymore. MD notified.

## 2022-05-13 NOTE — PLAN OF CARE
Patient transferred to this unit via W/C alert and oriented x 4 able to make needs known. Skin warm and dry to touch with visible redness note to left leg respirations easy and non labored, demeanor is calm denies pain and or discomfort at this time educated on the safety interventions including call bell. White board updated, will continue to monitor

## 2022-05-14 VITALS
BODY MASS INDEX: 44.54 KG/M2 | OXYGEN SATURATION: 97 % | HEIGHT: 66 IN | SYSTOLIC BLOOD PRESSURE: 158 MMHG | RESPIRATION RATE: 16 BRPM | TEMPERATURE: 98 F | DIASTOLIC BLOOD PRESSURE: 69 MMHG | HEART RATE: 84 BPM | WEIGHT: 277.13 LBS

## 2022-05-14 LAB
ALBUMIN SERPL BCP-MCNC: 2.8 G/DL (ref 3.5–5.2)
ALP SERPL-CCNC: 86 U/L (ref 55–135)
ALT SERPL W/O P-5'-P-CCNC: 17 U/L (ref 10–44)
ANION GAP SERPL CALC-SCNC: 9 MMOL/L (ref 8–16)
AST SERPL-CCNC: 50 U/L (ref 10–40)
BASOPHILS # BLD AUTO: 0.06 K/UL (ref 0–0.2)
BASOPHILS NFR BLD: 1.1 % (ref 0–1.9)
BILIRUB SERPL-MCNC: 0.4 MG/DL (ref 0.1–1)
BUN SERPL-MCNC: 8 MG/DL (ref 6–20)
CALCIUM SERPL-MCNC: 8.5 MG/DL (ref 8.7–10.5)
CHLORIDE SERPL-SCNC: 98 MMOL/L (ref 95–110)
CO2 SERPL-SCNC: 27 MMOL/L (ref 23–29)
CREAT SERPL-MCNC: 1.1 MG/DL (ref 0.5–1.4)
DIFFERENTIAL METHOD: ABNORMAL
EOSINOPHIL # BLD AUTO: 0.4 K/UL (ref 0–0.5)
EOSINOPHIL NFR BLD: 6.6 % (ref 0–8)
ERYTHROCYTE [DISTWIDTH] IN BLOOD BY AUTOMATED COUNT: 17.3 % (ref 11.5–14.5)
EST. GFR  (AFRICAN AMERICAN): >60 ML/MIN/1.73 M^2
EST. GFR  (NON AFRICAN AMERICAN): 55.5 ML/MIN/1.73 M^2
GLUCOSE SERPL-MCNC: 138 MG/DL (ref 70–110)
HCT VFR BLD AUTO: 27.1 % (ref 37–48.5)
HGB BLD-MCNC: 8 G/DL (ref 12–16)
IMM GRANULOCYTES # BLD AUTO: 0.08 K/UL (ref 0–0.04)
IMM GRANULOCYTES NFR BLD AUTO: 1.4 % (ref 0–0.5)
LYMPHOCYTES # BLD AUTO: 1.1 K/UL (ref 1–4.8)
LYMPHOCYTES NFR BLD: 19.3 % (ref 18–48)
MAGNESIUM SERPL-MCNC: 1.8 MG/DL (ref 1.6–2.6)
MCH RBC QN AUTO: 27.2 PG (ref 27–31)
MCHC RBC AUTO-ENTMCNC: 29.5 G/DL (ref 32–36)
MCV RBC AUTO: 92 FL (ref 82–98)
MONOCYTES # BLD AUTO: 0.8 K/UL (ref 0.3–1)
MONOCYTES NFR BLD: 13.8 % (ref 4–15)
NEUTROPHILS # BLD AUTO: 3.3 K/UL (ref 1.8–7.7)
NEUTROPHILS NFR BLD: 57.8 % (ref 38–73)
NRBC BLD-RTO: 0 /100 WBC
PLATELET # BLD AUTO: 178 K/UL (ref 150–450)
PMV BLD AUTO: 11.4 FL (ref 9.2–12.9)
POTASSIUM SERPL-SCNC: 3.5 MMOL/L (ref 3.5–5.1)
PROT SERPL-MCNC: 5.8 G/DL (ref 6–8.4)
RBC # BLD AUTO: 2.94 M/UL (ref 4–5.4)
SODIUM SERPL-SCNC: 134 MMOL/L (ref 136–145)
WBC # BLD AUTO: 5.64 K/UL (ref 3.9–12.7)

## 2022-05-14 PROCEDURE — 94660 CPAP INITIATION&MGMT: CPT

## 2022-05-14 PROCEDURE — 63600175 PHARM REV CODE 636 W HCPCS: Performed by: STUDENT IN AN ORGANIZED HEALTH CARE EDUCATION/TRAINING PROGRAM

## 2022-05-14 PROCEDURE — 80053 COMPREHEN METABOLIC PANEL: CPT | Performed by: STUDENT IN AN ORGANIZED HEALTH CARE EDUCATION/TRAINING PROGRAM

## 2022-05-14 PROCEDURE — 36415 COLL VENOUS BLD VENIPUNCTURE: CPT | Performed by: STUDENT IN AN ORGANIZED HEALTH CARE EDUCATION/TRAINING PROGRAM

## 2022-05-14 PROCEDURE — 99239 PR HOSPITAL DISCHARGE DAY,>30 MIN: ICD-10-PCS | Mod: ,,, | Performed by: STUDENT IN AN ORGANIZED HEALTH CARE EDUCATION/TRAINING PROGRAM

## 2022-05-14 PROCEDURE — 99239 HOSP IP/OBS DSCHRG MGMT >30: CPT | Mod: ,,, | Performed by: STUDENT IN AN ORGANIZED HEALTH CARE EDUCATION/TRAINING PROGRAM

## 2022-05-14 PROCEDURE — 94761 N-INVAS EAR/PLS OXIMETRY MLT: CPT

## 2022-05-14 PROCEDURE — 83735 ASSAY OF MAGNESIUM: CPT | Performed by: STUDENT IN AN ORGANIZED HEALTH CARE EDUCATION/TRAINING PROGRAM

## 2022-05-14 PROCEDURE — 25000003 PHARM REV CODE 250: Performed by: STUDENT IN AN ORGANIZED HEALTH CARE EDUCATION/TRAINING PROGRAM

## 2022-05-14 PROCEDURE — 85025 COMPLETE CBC W/AUTO DIFF WBC: CPT | Performed by: STUDENT IN AN ORGANIZED HEALTH CARE EDUCATION/TRAINING PROGRAM

## 2022-05-14 PROCEDURE — 25000003 PHARM REV CODE 250: Performed by: INTERNAL MEDICINE

## 2022-05-14 PROCEDURE — 99900035 HC TECH TIME PER 15 MIN (STAT)

## 2022-05-14 RX ORDER — SULFAMETHOXAZOLE AND TRIMETHOPRIM 800; 160 MG/1; MG/1
1 TABLET ORAL 2 TIMES DAILY
Qty: 12 TABLET | Refills: 0 | Status: SHIPPED | OUTPATIENT
Start: 2022-05-14 | End: 2022-05-20

## 2022-05-14 RX ORDER — OXYCODONE HYDROCHLORIDE 5 MG/1
5 TABLET ORAL EVERY 4 HOURS PRN
Qty: 30 TABLET | Refills: 0 | Status: SHIPPED | OUTPATIENT
Start: 2022-05-14 | End: 2022-06-13

## 2022-05-14 RX ADMIN — PROPAFENONE HYDROCHLORIDE 225 MG: 150 TABLET, FILM COATED ORAL at 06:05

## 2022-05-14 RX ADMIN — HYDROMORPHONE HYDROCHLORIDE 1 MG: 1 INJECTION, SOLUTION INTRAMUSCULAR; INTRAVENOUS; SUBCUTANEOUS at 02:05

## 2022-05-14 RX ADMIN — APIXABAN 5 MG: 5 TABLET, FILM COATED ORAL at 08:05

## 2022-05-14 RX ADMIN — THERA TABS 1 TABLET: TAB at 08:05

## 2022-05-14 RX ADMIN — DULOXETINE 120 MG: 60 CAPSULE, DELAYED RELEASE ORAL at 08:05

## 2022-05-14 RX ADMIN — HYDROMORPHONE HYDROCHLORIDE 1 MG: 1 INJECTION, SOLUTION INTRAMUSCULAR; INTRAVENOUS; SUBCUTANEOUS at 09:05

## 2022-05-14 RX ADMIN — FUROSEMIDE 40 MG: 40 TABLET ORAL at 08:05

## 2022-05-14 RX ADMIN — MEDROXYPROGESTERONE ACETATE 20 MG: 10 TABLET ORAL at 08:05

## 2022-05-14 RX ADMIN — SULFAMETHOXAZOLE AND TRIMETHOPRIM 1 TABLET: 800; 160 TABLET ORAL at 08:05

## 2022-05-14 RX ADMIN — METOPROLOL SUCCINATE 12.5 MG: 25 TABLET, EXTENDED RELEASE ORAL at 08:05

## 2022-05-14 RX ADMIN — PANTOPRAZOLE SODIUM 40 MG: 40 TABLET, DELAYED RELEASE ORAL at 08:05

## 2022-05-14 RX ADMIN — ATORVASTATIN CALCIUM 40 MG: 40 TABLET, FILM COATED ORAL at 08:05

## 2022-05-14 RX ADMIN — NIFEDIPINE 30 MG: 30 TABLET, FILM COATED, EXTENDED RELEASE ORAL at 08:05

## 2022-05-14 RX ADMIN — CELECOXIB 100 MG: 100 CAPSULE ORAL at 08:05

## 2022-05-14 NOTE — PROGRESS NOTES
[facility] - Intensive Care ([address] Medicine  Progress Note    Patient Name: Vicky Alvarado  MRN: [id number]  Patient Class: IP- Inpatient   Admission Date: 5/7/2022  Length of Stay: 4 days  Attending Physician: Lashaun Sellers MD  Primary Care Provider: Gordy Cordero MD        Subjective:     Principal Problem:Left leg cellulitis        HPI:    Vicky Alvarado is a 57 y.o. female who has a past medical history of Anticoagulant long-term use, Arthritis, Atrial fibrillation, vascular necrosis, CHF, Depression, GERD, psychiatric care, Hypertension, Iron deficiency anemia, Obese, now Pre-diabetes, Psychiatric problem, and Sleep apnea, presented to the ED with Erythema and Pain to her left leg. Which started about 2 days ago. Patient had 101F yesterday. She attempted urgent care but they were unable to see her. On presentation today, her leg is erythematous and tender to touch. She denies any injury. 3/4 SIRS with 101F, , RR 22, with normal WBC#. She has lactic acidosis of 3.8 and . CT showed knee joint effusion with some peripheral enhancement which could represent sterile effusion and nonspecific reactive synovitis; however, septic arthritis not excluded, additionally, there is a prepatellar small rim enhancing collection favored to represent nonspecific bursitis, although small abscess not excluded in the setting of surrounding cellulitis of LLE.     Incidental/new findings: 3.8 cm incidental left adnexal cyst, suspected leiomyomatous uterus, iron deficient.      Overview/Hospital Course:  Admitted with Cellulitis and LA of 3.8, , . Erythema, Swelling and tenderness reducing, except her knee which she states hurts worse today. Continuing IV abx, concern about going through cellulitis to bursa and may seed it (if not primary cause), will discuss with ortho. Erythema resolving. Still with pain and a bit of redness around knee, ortho will check tomorrow to make  sure still improving. Still +2 PE on RLE and +1 PE on LLE, will continue lasix, reduce BP meds for now. ERythema improving however patient still with a lot of pain and some edema.  Orthopedics saw patient in canceled case due to improvement recommend Bactrim        NAEON.  Patient is improving      Review of Systems   Constitutional:  Positive for activity change and fever.   HENT:  Negative for sore throat and trouble swallowing.    Eyes:  Negative for photophobia and visual disturbance.   Respiratory:  Negative for chest tightness and shortness of breath.    Cardiovascular:  Negative for chest pain, palpitations and leg swelling.   Gastrointestinal:  Negative for abdominal distention, abdominal pain, blood in stool, constipation, diarrhea, nausea and vomiting.   Endocrine: Negative for polyphagia and polyuria.   Genitourinary:  Negative for difficulty urinating, dysuria and hematuria.   Musculoskeletal:  Positive for joint swelling.   Skin:  Positive for color change.   Allergic/Immunologic: Negative for immunocompromised state.   Neurological:  Negative for dizziness, seizures, syncope and facial asymmetry.   Psychiatric/Behavioral:  Negative for agitation, behavioral problems and confusion.    Objective:     Vital Signs (Most Recent):  Temp: 98 °F (36.7 °C) (05/13/22 1703)  Pulse: 79 (05/13/22 1227)  Resp: 18 (05/13/22 1818)  BP: (!) 121/58 (05/13/22 1703)  SpO2: 98 % (05/13/22 1227)   Vital Signs (24h Range):  Temp:  [98 °F (36.7 °C)-98.4 °F (36.9 °C)] 98 °F (36.7 °C)  Pulse:  [79-89] 79  Resp:  [17-18] 18  SpO2:  [96 %-98 %] 98 %  BP: (120-138)/(57-69) 121/58     Weight: 125.7 kg (277 lb 1.9 oz)  Body mass index is 44.73 kg/m².    Physical Exam  Vitals and nursing note reviewed.   Constitutional:       Appearance: She is well-developed. She is obese. She is ill-appearing. She is not toxic-appearing or diaphoretic.   HENT:      Head: Normocephalic and atraumatic.      Nose: Nose normal. No congestion.   Eyes:       General: No scleral icterus.     Pupils: Pupils are equal, round, and reactive to light.   Neck:      Thyroid: No thyromegaly.   Cardiovascular:      Rate and Rhythm: Normal rate and regular rhythm.      Heart sounds: No murmur heard.    No gallop.   Pulmonary:      Effort: Pulmonary effort is normal.      Breath sounds: Normal breath sounds. No stridor. No wheezing or rales.   Abdominal:      General: There is no distension.      Palpations: Abdomen is soft. There is no mass.      Tenderness: There is no abdominal tenderness. There is no guarding.   Musculoskeletal:         General: Swelling and tenderness present. No signs of injury. Normal range of motion.      Cervical back: Normal range of motion and neck supple. No rigidity.      Right lower leg: No edema.      Left lower leg: Edema present.   Lymphadenopathy:      Cervical: No cervical adenopathy.   Skin:     General: Skin is warm and dry.      Capillary Refill: Capillary refill takes less than 2 seconds.      Findings: Erythema present.   Neurological:      General: No focal deficit present.      Mental Status: She is alert and oriented to person, place, and time.      Cranial Nerves: No cranial nerve deficit.      Motor: No weakness.   Psychiatric:         Mood and Affect: Mood normal.         Behavior: Behavior normal.         CRANIAL NERVES     CN III, IV, VI   Pupils are equal, round, and reactive to light.     +2 PE on RLE and +1 PE on LLE   pertinent labs and imaging reviewed      Assessment/Plan:      * Left leg cellulitis  Improving on IV abx, will transition to oral abx and monitor. Patient still with significant pain      Volume overload  Was on 80mg IV lasix, will transition to PO and monitor volume status      Cellulitis of extremity  3/4 SIRS with 101F, , RR 22, with normal WBC#.   She has lactic acidosis of 3.8 and .   CT showed knee joint effusion with some peripheral enhancement which could represent sterile effusion and  nonspecific reactive synovitis; however, septic arthritis not excluded, additionally, there is a prepatellar small rim enhancing collection favored to represent nonspecific bursitis, although small abscess not excluded in the setting of surrounding cellulitis of LLE.   -Vanc and Zosyn, now will transition to bactrim per ortho recs.   -blood cx NGTD  -ortho feels that bursitis/cellulitis is improving, will cancel procedure for today and follow up in clinic    Iron deficiency anemia  Give iron after infection settles      Recurrent major depressive disorder, in partial remission  Continue duloxetine      Gastroesophageal reflux disease without esophagitis  Continue PPI      Pure hypercholesterolemia  Cont statin      ERENDIRA (obstructive sleep apnea)  Offer CPAP nightly      Type 2 diabetes mellitus with diabetic polyneuropathy, without long-term current use of insulin  Well controlled. A1c 5.7  Diabetic diet      Morbid obesity with BMI of 45.0-49.9, adult  Body mass index is 44.73 kg/m². Morbid obesity complicates all aspects of disease management from diagnostic modalities to treatment. Weight loss encouraged and health benefits explained to patient.           Atrial Fibrillation (paroxysmal)  - S/p 2 cardioversion.  She is now in sinus.  - continue propafenone  - resume metoprolol.  Increase to home dose as tolerated.  - will restart apixaban as patient is not having procedure today    Essential hypertension  Continue home meds:  Lisinopril metoprolol nifedipine      Opioid dependence in remission  Noted        VTE Risk Mitigation (From admission, onward)         Ordered     apixaban tablet 5 mg  2 times daily         05/13/22 2012     IP VTE HIGH RISK PATIENT  Once         05/07/22 1953     Place sequential compression device  Until discontinued         05/07/22 1953                Discharge Planning   RAYO: 5/16/2022     Code Status: Full Code   Is the patient medically ready for discharge?: No    Reason for patient  still in hospital (select all that apply): Treatment  Discharge Plan A: Home Health, Home with family ([facility]/ [facility] (would like reassigned if needed))                  Lashaun Sellers MD  Department of Hospital Medicine   [facility] - Intensive Care ([place]-[facility])

## 2022-05-14 NOTE — SUBJECTIVE & OBJECTIVE
MARVIN.  Patient is improving      Review of Systems   Constitutional:  Positive for activity change and fever.   HENT:  Negative for sore throat and trouble swallowing.    Eyes:  Negative for photophobia and visual disturbance.   Respiratory:  Negative for chest tightness and shortness of breath.    Cardiovascular:  Negative for chest pain, palpitations and leg swelling.   Gastrointestinal:  Negative for abdominal distention, abdominal pain, blood in stool, constipation, diarrhea, nausea and vomiting.   Endocrine: Negative for polyphagia and polyuria.   Genitourinary:  Negative for difficulty urinating, dysuria and hematuria.   Musculoskeletal:  Positive for joint swelling.   Skin:  Positive for color change.   Allergic/Immunologic: Negative for immunocompromised state.   Neurological:  Negative for dizziness, seizures, syncope and facial asymmetry.   Psychiatric/Behavioral:  Negative for agitation, behavioral problems and confusion.    Objective:     Vital Signs (Most Recent):  Temp: 98 °F (36.7 °C) (05/13/22 1703)  Pulse: 79 (05/13/22 1227)  Resp: 18 (05/13/22 1818)  BP: (!) 121/58 (05/13/22 1703)  SpO2: 98 % (05/13/22 1227)   Vital Signs (24h Range):  Temp:  [98 °F (36.7 °C)-98.4 °F (36.9 °C)] 98 °F (36.7 °C)  Pulse:  [79-89] 79  Resp:  [17-18] 18  SpO2:  [96 %-98 %] 98 %  BP: (120-138)/(57-69) 121/58     Weight: 125.7 kg (277 lb 1.9 oz)  Body mass index is 44.73 kg/m².    Physical Exam  Vitals and nursing note reviewed.   Constitutional:       Appearance: She is well-developed. She is obese. She is ill-appearing. She is not toxic-appearing or diaphoretic.   HENT:      Head: Normocephalic and atraumatic.      Nose: Nose normal. No congestion.   Eyes:      General: No scleral icterus.     Pupils: Pupils are equal, round, and reactive to light.   Neck:      Thyroid: No thyromegaly.   Cardiovascular:      Rate and Rhythm: Normal rate and regular rhythm.      Heart sounds: No murmur heard.    No gallop.   Pulmonary:       Effort: Pulmonary effort is normal.      Breath sounds: Normal breath sounds. No stridor. No wheezing or rales.   Abdominal:      General: There is no distension.      Palpations: Abdomen is soft. There is no mass.      Tenderness: There is no abdominal tenderness. There is no guarding.   Musculoskeletal:         General: Swelling and tenderness present. No signs of injury. Normal range of motion.      Cervical back: Normal range of motion and neck supple. No rigidity.      Right lower leg: No edema.      Left lower leg: Edema present.   Lymphadenopathy:      Cervical: No cervical adenopathy.   Skin:     General: Skin is warm and dry.      Capillary Refill: Capillary refill takes less than 2 seconds.      Findings: Erythema present.   Neurological:      General: No focal deficit present.      Mental Status: She is alert and oriented to person, place, and time.      Cranial Nerves: No cranial nerve deficit.      Motor: No weakness.   Psychiatric:         Mood and Affect: Mood normal.         Behavior: Behavior normal.         CRANIAL NERVES     CN III, IV, VI   Pupils are equal, round, and reactive to light.     +2 PE on RLE and +1 PE on LLE   pertinent labs and imaging reviewed

## 2022-05-14 NOTE — SUBJECTIVE & OBJECTIVE
NEELIMAON.  Patient is improving, volume status improving. Doing well on oral abx. Home medications resumed. Plan for d/c tomorrow if stable      Review of Systems   Constitutional:  Positive for activity change and fever.   HENT:  Negative for sore throat and trouble swallowing.    Eyes:  Negative for photophobia and visual disturbance.   Respiratory:  Negative for chest tightness and shortness of breath.    Cardiovascular:  Negative for chest pain, palpitations and leg swelling.   Gastrointestinal:  Negative for abdominal distention, abdominal pain, blood in stool, constipation, diarrhea, nausea and vomiting.   Endocrine: Negative for polyphagia and polyuria.   Genitourinary:  Negative for difficulty urinating, dysuria and hematuria.   Musculoskeletal:  Positive for joint swelling.   Skin:  Positive for color change.   Allergic/Immunologic: Negative for immunocompromised state.   Neurological:  Negative for dizziness, seizures, syncope and facial asymmetry.   Psychiatric/Behavioral:  Negative for agitation, behavioral problems and confusion.    Objective:     Vital Signs (Most Recent):  Temp: 98 °F (36.7 °C) (05/13/22 1703)  Pulse: 79 (05/13/22 1227)  Resp: 18 (05/13/22 1818)  BP: (!) 121/58 (05/13/22 1703)  SpO2: 98 % (05/13/22 1227)   Vital Signs (24h Range):  Temp:  [98 °F (36.7 °C)-98.4 °F (36.9 °C)] 98 °F (36.7 °C)  Pulse:  [79-89] 79  Resp:  [17-18] 18  SpO2:  [96 %-98 %] 98 %  BP: (120-138)/(57-69) 121/58     Weight: 125.7 kg (277 lb 1.9 oz)  Body mass index is 44.73 kg/m².    Physical Exam  Vitals and nursing note reviewed.   Constitutional:       Appearance: She is well-developed. She is obese. She is ill-appearing. She is not toxic-appearing or diaphoretic.   HENT:      Head: Normocephalic and atraumatic.      Nose: Nose normal. No congestion.   Eyes:      General: No scleral icterus.     Pupils: Pupils are equal, round, and reactive to light.   Neck:      Thyroid: No thyromegaly.   Cardiovascular:      Rate  and Rhythm: Normal rate and regular rhythm.      Heart sounds: No murmur heard.    No gallop.   Pulmonary:      Effort: Pulmonary effort is normal.      Breath sounds: Normal breath sounds. No stridor. No wheezing or rales.   Abdominal:      General: There is no distension.      Palpations: Abdomen is soft. There is no mass.      Tenderness: There is no abdominal tenderness. There is no guarding.   Musculoskeletal:         General: Swelling and tenderness present. No signs of injury. Normal range of motion.      Cervical back: Normal range of motion and neck supple. No rigidity.      Right lower leg: No edema.      Left lower leg: Edema present.   Lymphadenopathy:      Cervical: No cervical adenopathy.   Skin:     General: Skin is warm and dry.      Capillary Refill: Capillary refill takes less than 2 seconds.      Findings: Erythema present.   Neurological:      General: No focal deficit present.      Mental Status: She is alert and oriented to person, place, and time.      Cranial Nerves: No cranial nerve deficit.      Motor: No weakness.   Psychiatric:         Mood and Affect: Mood normal.         Behavior: Behavior normal.         CRANIAL NERVES     CN III, IV, VI   Pupils are equal, round, and reactive to light.     +2 PE on RLE and +1 PE on LLE   pertinent labs and imaging reviewed

## 2022-05-14 NOTE — PROGRESS NOTES
[facility] - Int[facility] ([address] Medicine  Progress Note    Patient Name: Vicky Alvarado  MRN: [id number]  Patient Class: IP- Inpatient   Admission Date: 5/7/2022  Length of Stay: 4 days  Attending Physician: Lashaun Sellers MD  Primary Care Provider: Gordy Codrero MD        Subjective:     Principal Problem:Left leg cellulitis        HPI:    Vicky Alvarado is a 57 y.o. female who has a past medical history of Anticoagulant long-term use, Arthritis, Atrial fibrillation, vascular necrosis, CHF, Depression, GERD, psychiatric care, Hypertension, Iron deficiency anemia, Obese, now Pre-diabetes, Psychiatric problem, and Sleep apnea, presented to the ED with Erythema and Pain to her left leg. Which started about 2 days ago. Patient had 101F yesterday. She attempted urgent care but they were unable to see her. On presentation today, her leg is erythematous and tender to touch. She denies any injury. 3/4 SIRS with 101F, , RR 22, with normal WBC#. She has lactic acidosis of 3.8 and . CT showed knee joint effusion with some peripheral enhancement which could represent sterile effusion and nonspecific reactive synovitis; however, septic arthritis not excluded, additionally, there is a prepatellar small rim enhancing collection favored to represent nonspecific bursitis, although small abscess not excluded in the setting of surrounding cellulitis of LLE.     Incidental/new findings: 3.8 cm incidental left adnexal cyst, suspected leiomyomatous uterus, iron deficient.      Overview/Hospital Course:  Admitted with Cellulitis and LA of 3.8, , . Erythema, Swelling and tenderness reducing, except her knee which she states hurts worse today. Continuing IV abx, concern about going through cellulitis to bursa and may seed it (if not primary cause), will discuss with ortho. Erythema resolving. Still with pain and a bit of redness around knee, ortho will check tomorrow to make  sure still improving. Still +2 PE on RLE and +1 PE on LLE, will continue lasix, reduce BP meds for now. ERythema improving however patient still with a lot of pain and some edema.  Orthopedics saw patient in canceled case due to improvement recommend Bactrim        NAEON.  Patient is improving, volume status improving. Doing well on oral abx. Home medications resumed. Plan for d/c tomorrow if stable      Review of Systems   Constitutional:  Positive for activity change and fever.   HENT:  Negative for sore throat and trouble swallowing.    Eyes:  Negative for photophobia and visual disturbance.   Respiratory:  Negative for chest tightness and shortness of breath.    Cardiovascular:  Negative for chest pain, palpitations and leg swelling.   Gastrointestinal:  Negative for abdominal distention, abdominal pain, blood in stool, constipation, diarrhea, nausea and vomiting.   Endocrine: Negative for polyphagia and polyuria.   Genitourinary:  Negative for difficulty urinating, dysuria and hematuria.   Musculoskeletal:  Positive for joint swelling.   Skin:  Positive for color change.   Allergic/Immunologic: Negative for immunocompromised state.   Neurological:  Negative for dizziness, seizures, syncope and facial asymmetry.   Psychiatric/Behavioral:  Negative for agitation, behavioral problems and confusion.    Objective:     Vital Signs (Most Recent):  Temp: 98 °F (36.7 °C) (05/13/22 1703)  Pulse: 79 (05/13/22 1227)  Resp: 18 (05/13/22 1818)  BP: (!) 121/58 (05/13/22 1703)  SpO2: 98 % (05/13/22 1227)   Vital Signs (24h Range):  Temp:  [98 °F (36.7 °C)-98.4 °F (36.9 °C)] 98 °F (36.7 °C)  Pulse:  [79-89] 79  Resp:  [17-18] 18  SpO2:  [96 %-98 %] 98 %  BP: (120-138)/(57-69) 121/58     Weight: 125.7 kg (277 lb 1.9 oz)  Body mass index is 44.73 kg/m².    Physical Exam  Vitals and nursing note reviewed.   Constitutional:       Appearance: She is well-developed. She is obese. She is ill-appearing. She is not toxic-appearing or  diaphoretic.   HENT:      Head: Normocephalic and atraumatic.      Nose: Nose normal. No congestion.   Eyes:      General: No scleral icterus.     Pupils: Pupils are equal, round, and reactive to light.   Neck:      Thyroid: No thyromegaly.   Cardiovascular:      Rate and Rhythm: Normal rate and regular rhythm.      Heart sounds: No murmur heard.    No gallop.   Pulmonary:      Effort: Pulmonary effort is normal.      Breath sounds: Normal breath sounds. No stridor. No wheezing or rales.   Abdominal:      General: There is no distension.      Palpations: Abdomen is soft. There is no mass.      Tenderness: There is no abdominal tenderness. There is no guarding.   Musculoskeletal:         General: Swelling and tenderness present. No signs of injury. Normal range of motion.      Cervical back: Normal range of motion and neck supple. No rigidity.      Right lower leg: No edema.      Left lower leg: Edema present.   Lymphadenopathy:      Cervical: No cervical adenopathy.   Skin:     General: Skin is warm and dry.      Capillary Refill: Capillary refill takes less than 2 seconds.      Findings: Erythema present.   Neurological:      General: No focal deficit present.      Mental Status: She is alert and oriented to person, place, and time.      Cranial Nerves: No cranial nerve deficit.      Motor: No weakness.   Psychiatric:         Mood and Affect: Mood normal.         Behavior: Behavior normal.         CRANIAL NERVES     CN III, IV, VI   Pupils are equal, round, and reactive to light.     +2 PE on RLE and +1 PE on LLE   pertinent labs and imaging reviewed      Assessment/Plan:      * Left leg cellulitis  Improving on IV abx, will transition to oral abx and monitor. Patient still with significant pain      Volume overload  Was on 80mg IV lasix, will transition to PO and monitor volume status      Cellulitis of extremity  3/4 SIRS with 101F, , RR 22, with normal WBC#.   She has lactic acidosis of 3.8 and .    CT showed knee joint effusion with some peripheral enhancement which could represent sterile effusion and nonspecific reactive synovitis; however, septic arthritis not excluded, additionally, there is a prepatellar small rim enhancing collection favored to represent nonspecific bursitis, although small abscess not excluded in the setting of surrounding cellulitis of LLE.   -Vanc and Zosyn, now will transition to bactrim per ortho recs.   -blood cx NGTD  -ortho feels that bursitis/cellulitis is improving, will cancel procedure for today and follow up in clinic    Iron deficiency anemia  Give iron after infection settles      Recurrent major depressive disorder, in partial remission  Continue duloxetine      Gastroesophageal reflux disease without esophagitis  Continue PPI      Pure hypercholesterolemia  Cont statin      ERENDIRA (obstructive sleep apnea)  Offer CPAP nightly      Type 2 diabetes mellitus with diabetic polyneuropathy, without long-term current use of insulin  Well controlled. A1c 5.7  Diabetic diet      Morbid obesity with BMI of 45.0-49.9, adult  Body mass index is 44.73 kg/m². Morbid obesity complicates all aspects of disease management from diagnostic modalities to treatment. Weight loss encouraged and health benefits explained to patient.           Atrial Fibrillation (paroxysmal)  - S/p 2 cardioversion.  She is now in sinus.  - continue propafenone  - resume metoprolol.  Increase to home dose as tolerated.  - will restart apixaban as patient is not having procedure today    Essential hypertension  Continue home meds:  Lisinopril metoprolol nifedipine      Opioid dependence in remission  Noted        VTE Risk Mitigation (From admission, onward)         Ordered     apixaban tablet 5 mg  2 times daily         05/13/22 2012     IP VTE HIGH RISK PATIENT  Once         05/07/22 1953     Place sequential compression device  Until discontinued         05/07/22 1953                Discharge Planning   RAYO:  5/14/2022     Code Status: Full Code   Is the patient medically ready for discharge?: No    Reason for patient still in hospital (select all that apply): Treatment  Discharge Plan A: Home Health, Home with family ([facility] (would like reassigned if needed))                  Lashaun Sellers MD  Department of Hospital Medicine   [facility] - Intensive Care ([place]-14)

## 2022-05-14 NOTE — ASSESSMENT & PLAN NOTE
3/4 SIRS with 101F, , RR 22, with normal WBC#.   She has lactic acidosis of 3.8 and .   CT showed knee joint effusion with some peripheral enhancement which could represent sterile effusion and nonspecific reactive synovitis; however, septic arthritis not excluded, additionally, there is a prepatellar small rim enhancing collection favored to represent nonspecific bursitis, although small abscess not excluded in the setting of surrounding cellulitis of LLE.   -Vanc and Zosyn, now will transition to bactrim per ortho recs.   -blood cx NGTD  -ortho feels that bursitis/cellulitis is improving, will cancel procedure for today and follow up in clinic

## 2022-05-14 NOTE — ASSESSMENT & PLAN NOTE
- S/p 2 cardioversion.  She is now in sinus.  - continue propafenone  - resume metoprolol.  Increase to home dose as tolerated.  - will restart apixaban as patient is not having procedure today

## 2022-05-14 NOTE — ASSESSMENT & PLAN NOTE
Improving on IV abx, will transition to oral abx and monitor. Patient still with significant pain

## 2022-05-14 NOTE — DISCHARGE SUMMARY
Discharge Summary  Department of Hospital Medicine      Date of Admit:  5/7/2022  Date of Discharge:  5/14/2022  2:09 PM    Chief Complaint/Reason for Admission:  Left leg cellulitis    Discharge Diagnoses:   Principal Problem:  Left leg cellulitis  Secondary Diagnoses:    Active Hospital Problems    Diagnosis  POA    *Left leg cellulitis [L03.116]  Yes    Volume overload [E87.70]  Yes    Iron deficiency anemia [D50.9]  Yes     TIBC 444 with saturated iron 8      Cellulitis of extremity [L03.119]  Yes    Recurrent major depressive disorder, in partial remission [F33.41]  Yes    Gastroesophageal reflux disease without esophagitis [K21.9]  Yes    Pure hypercholesterolemia [E78.00]  Yes    ERENDIRA (obstructive sleep apnea) [G47.33]  Yes    Type 2 diabetes mellitus with diabetic polyneuropathy, without long-term current use of insulin [E11.42]  Yes     Chronic    Atrial Fibrillation (paroxysmal) [I48.0]  Yes    Morbid obesity with BMI of 45.0-49.9, adult [E66.01, Z68.42]  Not Applicable    Essential hypertension [I10]  Yes    Opioid dependence in remission [F11.21]  Yes      Resolved Hospital Problems   No resolved problems to display.       Hospital Course:  HPI:     Vicky Alvarado is a 57 y.o. female who has a past medical history of Anticoagulant long-term use, Arthritis, Atrial fibrillation, vascular necrosis, CHF, Depression, GERD, psychiatric care, Hypertension, Iron deficiency anemia, Obese, now Pre-diabetes, Psychiatric problem, and Sleep apnea, presented to the ED with Erythema and Pain to her left leg. Which started about 2 days ago. Patient had 101F yesterday. She attempted urgent care but they were unable to see her. On presentation today, her leg is erythematous and tender to touch. She denies any injury. 3/4 SIRS with 101F, , RR 22, with normal WBC#. She has lactic acidosis of 3.8 and . CT showed knee joint effusion with some peripheral enhancement which could represent  sterile effusion and nonspecific reactive synovitis; however, septic arthritis not excluded, additionally, there is a prepatellar small rim enhancing collection favored to represent nonspecific bursitis, although small abscess not excluded in the setting of surrounding cellulitis of LLE.      Incidental/new findings: 3.8 cm incidental left adnexal cyst, suspected leiomyomatous uterus, iron deficient.        Overview/Hospital Course:  Admitted with Cellulitis and LA of 3.8, , . CT showed knee joint effusion with some peripheral enhancement which could represent sterile effusion and nonspecific reactive synovitis; however, septic arthritis not excluded, additionally, there is a prepatellar small rim enhancing collection favored to represent nonspecific bursitis, although small abscess not excluded in the setting of surrounding cellulitis of LLE. Erythema, Swelling and tenderness reducing. Initially started IV abx with vanc and zosyn and orthopedics consulted., concern about going through cellulitis to bursa and may seed it (if not primary cause), Blood cx showed no growth.  Ultimately erythema and swelling improved thus orthopedics did not feel that surgical intervention was necessary for bursitis.  Patient continued to improve.  Had some mild pitting edema on day of discharge however was discharged to complete a total 7 days course of Bactrim per Orthopedics recommendation and will follow up in orthopedics clinic.        Consults:  orthopedics  Procedures:  Procedure(s) (LRB):  INCISION AND DRAINAGE, Prepatellar bursectomy. (Left)  Significant Diagnostic Studies:  I have reviewed all pertinent lab results within the past 24 hours.  Imaging Results          X-Ray Knee 3 View Left (Final result)  Result time 05/08/22 01:13:57    Final result by Timur Sanabria MD (05/08/22 01:13:57)                 Impression:      As above.      Electronically signed by: Timur Sanabria  MD  Date:    05/08/2022  Time:    01:13             Narrative:    EXAMINATION:  XR KNEE 3 VIEW LEFT    CLINICAL HISTORY:  postop;    TECHNIQUE:  AP, lateral, and Merchant views of the left knee were performed.    COMPARISON:  05/07/2022 left tibia and fibula.    FINDINGS:  Suboptimal positioning on the AP view with the knee appears to be in flexion.  Joint space is not adequately assessed.    No displaced fracture or dislocation.  Suboptimal positioning on AP view results in limited assessment of the tibiofemoral joint space and tibial plateau.  Suprapatellar effusion present.  Diffuse soft tissue edema present.                                CT Thigh With Contrast Left (Final result)  Result time 05/07/22 19:04:28    Final result by Dre Real MD (05/07/22 19:04:28)                 Impression:      Findings concerning for left lower extremity cellulitis.    Knee joint effusion with some peripheral enhancement which could represent sterile effusion and nonspecific reactive synovitis; however, septic arthritis not excluded.  Additionally, there is a prepatellar small rim enhancing collection favored to represent nonspecific bursitis, although small abscess not excluded in the setting of surrounding cellulitis.  Clinical correlation advised.  Fluid sampling may be warranted.    Mild degenerative changes of the lower extremity as detailed above.    3.8 cm incidental left adnexal cyst.  For a patient of the this age group pelvic ultrasound follow-up in 6-12 months is recommended per ACR guidelines, or sooner if associated pain develops.    Suspected leiomyomatous uterus.  Further evaluation with elective/nonemergent pelvic ultrasound can be obtained as warranted.    Additional findings above.    This report was flagged in Epic as abnormal.    Electronically signed by resident: Ramon Hammonds  Date:    05/07/2022  Time:    18:26    Electronically signed by: Dre Real MD  Date:    05/07/2022  Time:    19:04              Narrative:    EXAMINATION:  CT THIGH WITH CONTRAST LEFT; CT LEG (TIBIA-FIBULA) WITH CONTRAST LEFT    CLINICAL HISTORY:  Soft tissue infection suspected, thigh, xray done;; Soft tissue infection suspected, lower leg, xray done;    TECHNIQUE:  Axial images performed through the iliac crests to the foot of the left lower extremity after administration of 100 mL of intravenous Omnipaque 350.  Coronal and sagittal reformations performed.    COMPARISON:  Lower extremity veins ultrasound 05/07/2022    FINDINGS:  The bones of the left lower extremity demonstrate mild degenerative changes of the left knee indicated by subchondral sclerosis, osteophytic formation, and mild joint space narrowing.  Degenerative changes of the 1st metatarsophalangeal joint is noted as well.  No acute fracture.  No osseous destructive lesions.  Small suprapatellar and femorotibial joint effusions with some peripheral enhancement.    The soft tissues of the left lower extremity demonstrate multiple areas of soft tissue attenuation with peripheral calcification in the subcutaneous soft tissues overlying the lateral left thigh, perhaps relating to remote trauma or injection medication use.  Left inguinal skin thickening, likely postsurgical related to prior left superficial femoral artery branch laceration repair.  There is left inguinal lymphadenopathy, for example lymph node measuring 1.2 cm in short axis (series 301, image 845). Septation of the subcutaneous fat involving the left lower extremity mainly in the lower leg, indicating edema.  Mild skin thickening overlying the left lateral lower leg.  There is approximate 2.8 x 1.2 x 2.8 cm slightly heterogeneous collection within enhancing rim along the anterior mid to lower aspect of the patella.    Limited intrapelvic evaluation demonstrates enlargement of the uterus with punctate internal calcifications, likely leiomyomatous uterus.  3.8 cm left adnexal hypodensity most consistent with  a cyst.                                CT Leg (Tibia-Fibula) Wtih Contrast Left (Final result)  Result time 05/07/22 19:04:28    Final result by Dre Real MD (05/07/22 19:04:28)                 Impression:      Findings concerning for left lower extremity cellulitis.    Knee joint effusion with some peripheral enhancement which could represent sterile effusion and nonspecific reactive synovitis; however, septic arthritis not excluded.  Additionally, there is a prepatellar small rim enhancing collection favored to represent nonspecific bursitis, although small abscess not excluded in the setting of surrounding cellulitis.  Clinical correlation advised.  Fluid sampling may be warranted.    Mild degenerative changes of the lower extremity as detailed above.    3.8 cm incidental left adnexal cyst.  For a patient of the this age group pelvic ultrasound follow-up in 6-12 months is recommended per ACR guidelines, or sooner if associated pain develops.    Suspected leiomyomatous uterus.  Further evaluation with elective/nonemergent pelvic ultrasound can be obtained as warranted.    Additional findings above.    This report was flagged in Epic as abnormal.    Electronically signed by resident: Ramon Hammonds  Date:    05/07/2022  Time:    18:26    Electronically signed by: Dre Real MD  Date:    05/07/2022  Time:    19:04             Narrative:    EXAMINATION:  CT THIGH WITH CONTRAST LEFT; CT LEG (TIBIA-FIBULA) WITH CONTRAST LEFT    CLINICAL HISTORY:  Soft tissue infection suspected, thigh, xray done;; Soft tissue infection suspected, lower leg, xray done;    TECHNIQUE:  Axial images performed through the iliac crests to the foot of the left lower extremity after administration of 100 mL of intravenous Omnipaque 350.  Coronal and sagittal reformations performed.    COMPARISON:  Lower extremity veins ultrasound 05/07/2022    FINDINGS:  The bones of the left lower extremity demonstrate mild degenerative changes of  the left knee indicated by subchondral sclerosis, osteophytic formation, and mild joint space narrowing.  Degenerative changes of the 1st metatarsophalangeal joint is noted as well.  No acute fracture.  No osseous destructive lesions.  Small suprapatellar and femorotibial joint effusions with some peripheral enhancement.    The soft tissues of the left lower extremity demonstrate multiple areas of soft tissue attenuation with peripheral calcification in the subcutaneous soft tissues overlying the lateral left thigh, perhaps relating to remote trauma or injection medication use.  Left inguinal skin thickening, likely postsurgical related to prior left superficial femoral artery branch laceration repair.  There is left inguinal lymphadenopathy, for example lymph node measuring 1.2 cm in short axis (series 301, image 845). Septation of the subcutaneous fat involving the left lower extremity mainly in the lower leg, indicating edema.  Mild skin thickening overlying the left lateral lower leg.  There is approximate 2.8 x 1.2 x 2.8 cm slightly heterogeneous collection within enhancing rim along the anterior mid to lower aspect of the patella.    Limited intrapelvic evaluation demonstrates enlargement of the uterus with punctate internal calcifications, likely leiomyomatous uterus.  3.8 cm left adnexal hypodensity most consistent with a cyst.                               US Lower Extremity Veins Left (Final result)  Result time 05/07/22 14:23:00    Final result by Dre Real MD (05/07/22 14:23:00)                 Impression:      No evidence of deep venous thrombosis in the left lower extremity.      Electronically signed by: Dre Real MD  Date:    05/07/2022  Time:    14:23             Narrative:    EXAMINATION:  US LOWER EXTREMITY VEINS LEFT    CLINICAL HISTORY:  Other specified soft tissue disorders    TECHNIQUE:  Duplex and color flow Doppler evaluation and graded compression of the left lower extremity veins  was performed.    COMPARISON:  None    FINDINGS:  Left thigh veins: The common femoral, femoral, popliteal, upper greater saphenous, and deep femoral veins are patent and free of thrombus. The veins are normally compressible and have normal phasic flow and augmentation response.    Left calf veins: The visualized calf veins are patent.    Contralateral CFV: The contralateral (right) common femoral vein is patent and free of thrombus.    Miscellaneous: Nonspecific subcutaneous edema at the left calf region.                               X-Ray Tibia Fibula 2 View Left (Final result)  Result time 05/07/22 13:57:38    Final result by Joshua Fritz MD (05/07/22 13:57:38)                 Impression:      As above.      Electronically signed by: Joshua Fritz  Date:    05/07/2022  Time:    13:57             Narrative:    EXAMINATION:  XR TIBIA FIBULA 2 VIEW LEFT    CLINICAL HISTORY:  Pain in leg, unspecified    TECHNIQUE:  AP and lateral views of the left tibia and fibula were performed.    COMPARISON:  None.    FINDINGS:  No acute fracture, dislocation or osseous destruction.  No radiopaque foreign body.  Large calcaneal enthesophyte at the distal Achilles insertion.  Partial visualized femorotibial degenerative change.                                Discharge Medications:  Reconciled Home Medications:      Medication List      START taking these medications    oxyCODONE 5 MG immediate release tablet  Commonly known as: ROXICODONE  Take 1 tablet (5 mg total) by mouth every 4 (four) hours as needed for Pain.     sulfamethoxazole-trimethoprim 800-160mg 800-160 mg Tab  Commonly known as: BACTRIM DS  Take 1 tablet by mouth 2 (two) times daily for 6 days        CONTINUE taking these medications    acetaminophen 500 MG tablet  Commonly known as: TYLENOL  Take 2 tablets (1,000 mg) by mouth at onset of pain or headache, may repeat if needed.     ALPRAZolam 0.5 MG tablet  Commonly known as: XANAX  Take 1 tablet (0.5 mg  total) by mouth 2 (two) times daily as needed for Anxiety.     atorvastatin 40 MG tablet  Commonly known as: LIPITOR  Take 1 tablet (40 mg total) by mouth once daily.     b complex vitamins capsule  Take 1 capsule by mouth once daily.     colchicine 0.6 mg tablet  Commonly known as: COLCRYS  For gout attack: Take TWO tablets by mouth, then, 2 hours later, take ONE additional tablet. Then take 1 tablet twice daily only if still needed for gout pain.     DULoxetine 60 MG capsule  Commonly known as: CYMBALTA  Take 2 capsules (120 mg total) by mouth once daily.     ELIQUIS 5 mg Tab  Generic drug: apixaban  Take 1 tablet (5 mg total) by mouth 2 (two) times daily.     ferrous sulfate 142 mg (45 mg iron) Tbsr  Commonly known as: SLOW RELEASE IRON  Take 1 tablet (142 mg total) by mouth once daily. FOR 7 DAY THEN INCREASE TO TWICE DAILY AS TOLERATED     furosemide 40 MG tablet  Commonly known as: LASIX  Take 1 tablet (40 mg total) by mouth 2 (two) times daily. Resume as needed for edema until followup with your PCP.     upudhluv-cutl-cyd2-C-radha-bosw 750 mg-644 mg- 30 mg-1 mg Tab  Take 1 tablet by mouth once daily.     * lisinopriL 40 MG tablet  Commonly known as: PRINIVIL,ZESTRIL  Take 0.5 tablets (20 mg total) by mouth once daily.     * lisinopriL 40 MG tablet  Commonly known as: PRINIVIL,ZESTRIL  Take 1 tablet (40 mg total) by mouth once daily.     MEGARED OMEGA-3 KRILL OIL ORAL  Take 1 capsule by mouth once daily.     melatonin 10 mg Tab  Take 1-2 tablets by mouth nightly as needed (Sleep).     metoprolol succinate 50 MG 24 hr tablet  Commonly known as: TOPROL-XL  Take 1 tablet (50 mg total) by mouth 2 (two) times daily.     multivitamin per tablet  Commonly known as: THERAGRAN  Take 1 tablet by mouth once daily.     NIFEdipine 90 MG (OSM) 24 hr tablet  Commonly known as: PROCARDIA-XL  Take 1 tablet (90 mg total) by mouth once daily. HOLD UNTIL FOLLOW-UP WITH YOUR PCP.     omeprazole 20 MG capsule  Commonly known as:  PRILOSEC  TAKE 1 CAPSULE BY MOUTH ONCE DAILY     ondansetron 4 MG Tbdl  Commonly known as: ZOFRAN-ODT  Take 2 tablets (8 mg total) by mouth every 8 (eight) hours as needed (nasuea, vomiting).     polyethylene glycol 17 gram Pwpk  Commonly known as: GLYCOLAX  Take by mouth as needed (constipation).     potassium chloride SA 20 MEQ tablet  Commonly known as: K-DUR,KLOR-CON  Take 1 tablet (20 mEq total) by mouth once daily. ONLY TAKE WITH LASIX.     progesterone 200 MG capsule  Commonly known as: PROMETRIUM  Take 1 capsule (200 mg total) by mouth 2 (two) times a day.     propafenone 225 MG Tab  Commonly known as: RYTHMOL  Take 1 tablet (225 mg total) by mouth every 8 (eight) hours.     traZODone 100 MG tablet  Commonly known as: DESYREL  Take 1 tablet (100 mg total) by mouth nightly as needed for Insomnia.     UNABLE TO FIND  Take 1 tablet by mouth once daily. Trino's Leg Cramps         * This list has 2 medication(s) that are the same as other medications prescribed for you. Read the directions carefully, and ask your doctor or other care provider to review them with you.            ASK your doctor about these medications    pantoprazole 40 MG tablet  Commonly known as: PROTONIX  Take 1 tablet (40 mg total) by mouth once daily.          Discharge Procedure Orders   Ambulatory referral/consult to Outpatient Case Management   Referral Priority: Routine Referral Type: Consultation   Referral Reason: Specialty Services Required   Number of Visits Requested: 1     Diet Cardiac     Activity as tolerated        Follow-up Information     Gordy Cordero MD. Schedule an appointment as soon as possible for a visit in 2 days.    Specialty: Internal Medicine  Contact information:  [address]  [address]  [place] [id number] [phone]             [facility] - Emergency Dept .    Specialty: Emergency Medicine  Why: If symptoms worsen  Contact information:  [address]  [facility]  [address]  [phone]                     Pending Studies:  None  Condition: stable  Disposition: Home or Self Care  Time Spent Coordinating Care for Discharge:  Greater than 30 minutes  Last Recorded LACE+ Scores     LACE+ Score     05/14 [id number]

## 2022-05-14 NOTE — ASSESSMENT & PLAN NOTE
Body mass index is 44.73 kg/m². Morbid obesity complicates all aspects of disease management from diagnostic modalities to treatment. Weight loss encouraged and health benefits explained to patient.

## 2022-05-14 NOTE — PROGRESS NOTES
Patient discharged home. Bedside pharmacy delivered meds, this nurse removed I.vs and went over avs. Patient transported via wheelchair to her brother's car with all her personal belongings.

## 2022-05-14 NOTE — PLAN OF CARE
Patient received alert and oriented x4 with family at the bedside , no indication of any pain and or discomfort at this time. White board updated on the plan of care. All safety interventions are in place, call bell in use. Will continue to observe

## 2022-05-16 ENCOUNTER — PATIENT MESSAGE (OUTPATIENT)
Dept: ADMINISTRATIVE | Facility: CLINIC | Age: 57
End: 2022-05-16
Payer: COMMERCIAL

## 2022-05-16 ENCOUNTER — PATIENT OUTREACH (OUTPATIENT)
Dept: ADMINISTRATIVE | Facility: CLINIC | Age: 57
End: 2022-05-16
Payer: COMMERCIAL

## 2022-05-16 NOTE — PROGRESS NOTES
C3 nurse attempted to contact Vicky Alvarado  for a TCC post hospital discharge follow up call. No answer. Left voicemail with callback information. The patient does not have a scheduled HOSFU appointment. Non [facility] PCP

## 2022-05-17 NOTE — PROGRESS NOTES
C3 nurse attempted to contact iVcky Alvarado  for a TCC post hospital discharge follow up call. No answer. Left voicemail with callback information. The patient does not have a scheduled HOSFU appointment. Non [facility] PCP

## 2022-05-19 ENCOUNTER — OFFICE VISIT (OUTPATIENT)
Dept: SPORTS MEDICINE | Facility: CLINIC | Age: 57
End: 2022-05-19
Payer: COMMERCIAL

## 2022-05-19 VITALS
HEIGHT: 66 IN | DIASTOLIC BLOOD PRESSURE: 74 MMHG | SYSTOLIC BLOOD PRESSURE: 143 MMHG | BODY MASS INDEX: 46.67 KG/M2 | WEIGHT: 290.38 LBS | HEART RATE: 92 BPM

## 2022-05-19 DIAGNOSIS — L03.116 CELLULITIS OF LEFT LOWER EXTREMITY: Primary | ICD-10-CM

## 2022-05-19 PROCEDURE — 1160F PR REVIEW ALL MEDS BY PRESCRIBER/CLIN PHARMACIST DOCUMENTED: ICD-10-PCS | Mod: CPTII,S$GLB,, | Performed by: STUDENT IN AN ORGANIZED HEALTH CARE EDUCATION/TRAINING PROGRAM

## 2022-05-19 PROCEDURE — 1160F RVW MEDS BY RX/DR IN RCRD: CPT | Mod: CPTII,S$GLB,, | Performed by: STUDENT IN AN ORGANIZED HEALTH CARE EDUCATION/TRAINING PROGRAM

## 2022-05-19 PROCEDURE — 1111F PR DISCHARGE MEDS RECONCILED W/ CURRENT OUTPATIENT MED LIST: ICD-10-PCS | Mod: CPTII,S$GLB,, | Performed by: STUDENT IN AN ORGANIZED HEALTH CARE EDUCATION/TRAINING PROGRAM

## 2022-05-19 PROCEDURE — 3078F PR MOST RECENT DIASTOLIC BLOOD PRESSURE < 80 MM HG: ICD-10-PCS | Mod: CPTII,S$GLB,, | Performed by: STUDENT IN AN ORGANIZED HEALTH CARE EDUCATION/TRAINING PROGRAM

## 2022-05-19 PROCEDURE — 3077F SYST BP >= 140 MM HG: CPT | Mod: CPTII,S$GLB,, | Performed by: STUDENT IN AN ORGANIZED HEALTH CARE EDUCATION/TRAINING PROGRAM

## 2022-05-19 PROCEDURE — 3008F BODY MASS INDEX DOCD: CPT | Mod: CPTII,S$GLB,, | Performed by: STUDENT IN AN ORGANIZED HEALTH CARE EDUCATION/TRAINING PROGRAM

## 2022-05-19 PROCEDURE — 3044F PR MOST RECENT HEMOGLOBIN A1C LEVEL <7.0%: ICD-10-PCS | Mod: CPTII,S$GLB,, | Performed by: STUDENT IN AN ORGANIZED HEALTH CARE EDUCATION/TRAINING PROGRAM

## 2022-05-19 PROCEDURE — 3078F DIAST BP <80 MM HG: CPT | Mod: CPTII,S$GLB,, | Performed by: STUDENT IN AN ORGANIZED HEALTH CARE EDUCATION/TRAINING PROGRAM

## 2022-05-19 PROCEDURE — 1159F PR MEDICATION LIST DOCUMENTED IN MEDICAL RECORD: ICD-10-PCS | Mod: CPTII,S$GLB,, | Performed by: STUDENT IN AN ORGANIZED HEALTH CARE EDUCATION/TRAINING PROGRAM

## 2022-05-19 PROCEDURE — 1111F DSCHRG MED/CURRENT MED MERGE: CPT | Mod: CPTII,S$GLB,, | Performed by: STUDENT IN AN ORGANIZED HEALTH CARE EDUCATION/TRAINING PROGRAM

## 2022-05-19 PROCEDURE — 99213 OFFICE O/P EST LOW 20 MIN: CPT | Mod: S$GLB,,, | Performed by: STUDENT IN AN ORGANIZED HEALTH CARE EDUCATION/TRAINING PROGRAM

## 2022-05-19 PROCEDURE — 3044F HG A1C LEVEL LT 7.0%: CPT | Mod: CPTII,S$GLB,, | Performed by: STUDENT IN AN ORGANIZED HEALTH CARE EDUCATION/TRAINING PROGRAM

## 2022-05-19 PROCEDURE — 4010F ACE/ARB THERAPY RXD/TAKEN: CPT | Mod: CPTII,S$GLB,, | Performed by: STUDENT IN AN ORGANIZED HEALTH CARE EDUCATION/TRAINING PROGRAM

## 2022-05-19 PROCEDURE — 1159F MED LIST DOCD IN RCRD: CPT | Mod: CPTII,S$GLB,, | Performed by: STUDENT IN AN ORGANIZED HEALTH CARE EDUCATION/TRAINING PROGRAM

## 2022-05-19 PROCEDURE — 99999 PR PBB SHADOW E&M-EST. PATIENT-LVL III: CPT | Mod: PBBFAC,,, | Performed by: STUDENT IN AN ORGANIZED HEALTH CARE EDUCATION/TRAINING PROGRAM

## 2022-05-19 PROCEDURE — 4010F PR ACE/ARB THEARPY RXD/TAKEN: ICD-10-PCS | Mod: CPTII,S$GLB,, | Performed by: STUDENT IN AN ORGANIZED HEALTH CARE EDUCATION/TRAINING PROGRAM

## 2022-05-19 PROCEDURE — 3077F PR MOST RECENT SYSTOLIC BLOOD PRESSURE >= 140 MM HG: ICD-10-PCS | Mod: CPTII,S$GLB,, | Performed by: STUDENT IN AN ORGANIZED HEALTH CARE EDUCATION/TRAINING PROGRAM

## 2022-05-19 PROCEDURE — 99999 PR PBB SHADOW E&M-EST. PATIENT-LVL III: ICD-10-PCS | Mod: PBBFAC,,, | Performed by: STUDENT IN AN ORGANIZED HEALTH CARE EDUCATION/TRAINING PROGRAM

## 2022-05-19 PROCEDURE — 99213 PR OFFICE/OUTPT VISIT, EST, LEVL III, 20-29 MIN: ICD-10-PCS | Mod: S$GLB,,, | Performed by: STUDENT IN AN ORGANIZED HEALTH CARE EDUCATION/TRAINING PROGRAM

## 2022-05-19 PROCEDURE — 3008F PR BODY MASS INDEX (BMI) DOCUMENTED: ICD-10-PCS | Mod: CPTII,S$GLB,, | Performed by: STUDENT IN AN ORGANIZED HEALTH CARE EDUCATION/TRAINING PROGRAM

## 2022-05-19 RX ORDER — HYDROCODONE BITARTRATE AND ACETAMINOPHEN 5; 325 MG/1; MG/1
1 TABLET ORAL EVERY 6 HOURS PRN
Qty: 15 TABLET | Refills: 0 | Status: SHIPPED | OUTPATIENT
Start: 2022-05-19 | End: 2022-06-13

## 2022-05-19 NOTE — PROGRESS NOTES
Consult Note  Orthopaedics    SUBJECTIVE:     History of Present Illness:  Patient is a 57 y.o. female presents for hospital follow-up for her left knee and lower leg cellulitis and prepatellar bursitis.  She was treated with IV antibiotics and discharged on p.o. Bactrim, she has her last dose today.  She has been compliant with antibiotic use.  She states overall her leg is significantly better.  She does still have some pain mainly in the lateral aspect of the leg.  She also complains of some swelling to the entirety left lower extremity.  This all gradually improving.    Scheduled Meds:  Continuous Infusions:  PRN Meds:    Review of patient's allergies indicates:   Allergen Reactions    Flecainide Shortness Of Breath and Swelling       Past Medical History:   Diagnosis Date    Anticoagulant long-term use     Arthritis     Atrial fibrillation     cardioversion    Atrial fibrillation with RVR     Avascular necrosis     L hand    CHF (congestive heart failure)     Depression     Encounter for blood transfusion 07/22/2020    GERD (gastroesophageal reflux disease)     Hx of psychiatric care     Hypertension     Iron deficiency anemia 5/7/2022    TIBC 444 with saturated iron 8    Obese     Pre-diabetes 5/7/2022    Psychiatric problem     Sleep apnea      Past Surgical History:   Procedure Laterality Date    ABLATION OF ARRHYTHMOGENIC FOCUS FOR ATRIAL FIBRILLATION N/A 7/20/2020    Procedure: Ablation atrial fibrillation;  Surgeon: Gabriel Hawley MD;  Location: [facility] EP LAB;  Service: Cardiology;  Laterality: N/A;  afib, PVI, RFA, REENA, SHADY, anes, MB, 3 Prep    ABLATION OF ARRHYTHMOGENIC FOCUS FOR ATRIAL FIBRILLATION N/A 1/24/2022    Procedure: Ablation atrial fibrillation;  Surgeon: Gabriel Hawely MD;  Location: [facility] EP LAB;  Service: Cardiology;  Laterality: N/A;  afib/afl, PVI (re-do)/CTI, RFA, REENA (cx if SR), SHADY, anes, MB, 3 Prep    APPLICATION OF WOUND VACUUM-ASSISTED CLOSURE DEVICE Left  8/3/2020    Procedure: APPLICATION, WOUND VAC;  Surgeon: SEMAJ Márquez III, MD;  Location: [facility] OR 2ND FLR;  Service: Peripheral Vascular;  Laterality: Left;    APPLICATION OF WOUND VACUUM-ASSISTED CLOSURE DEVICE Left 8/6/2020    Procedure: APPLICATION, WOUND VAC;  Surgeon: SEMAJ Márquez III, MD;  [facility]LR;  Service: Peripheral Vascular;  Laterality: Left;    CARPAL TUNNEL RELEASE Right 6/10/2020    Procedure: RELEASE, CARPAL TUNNEL - RIGHT;  Surgeon: Adelaida Hall MD;  Location: [facility];  Service: Orthopedics;  Laterality: Right;  GENERAL AND REGIONAL    CARPAL TUNNEL RELEASE Left 5/5/2021    Procedure: RELEASE, CARPAL TUNNEL, LEFT;  Surgeon: Adelaida Hall MD;  Location: [facility] OR;  Service: Orthopedics;  Laterality: Left;  GENERAL & REGIONAL IN PACU    CLOSURE OF WOUND Left 8/6/2020    Procedure: CLOSURE, WOUND;  Surgeon: SEMAJ Márquez III, MD;  Location: [facility] OR 2ND FLR;  Service: Peripheral Vascular;  Laterality: Left;  Complex    COLONOSCOPY N/A 8/17/2021    Procedure: COLONOSCOPY Suprep;  Surgeon: Loco Deluca MD;  Location: [facility];  Service: Endoscopy;  Laterality: N/A;    ESOPHAGOGASTRODUODENOSCOPY N/A 8/17/2021    Procedure: EGD (ESOPHAGOGASTRODUODENOSCOPY);  Surgeon: Loco Deluca MD;  Location: [facility];  Service: Endoscopy;  Laterality: N/A;  Patient is schedule to have her Covid test on 08/14/2021 at the [facility] @ 9:30am. [place].    EXPLORATION OF FEMORAL ARTERY Left 7/21/2020    Procedure: EXPLORATION, ARTERY, FEMORAL;  Surgeon: SEMAJ Márquez III, MD;  Location: [facility] OR 2ND FLR;  Service: Peripheral Vascular;  Laterality: Left;  1. Urgent Direct repair, L SFA branch laceration    FOOT SURGERY      HYSTEROSCOPY WITH DILATION AND CURETTAGE OF UTERUS N/A 2/19/2022    Procedure: HYSTEROSCOPY, WITH DILATION AND CURETTAGE OF UTERUS;  Surgeon: Shane Palomo MD;  Location: [facility] OR 2ND FLR;  Service: OB/GYN;  Laterality: N/A;     INCISION AND DRAINAGE OF KNEE Left 2022    Procedure: INCISION AND DRAINAGE, Prepatellar bursectomy.;  Surgeon: rDe Guzmán MD;  Location: [facility] OR [address] [place] [address];  Service: Orthopedics;  Laterality: Left;    TREATMENT OF CARDIAC ARRHYTHMIA N/A 2020    Procedure: CARDIOVERSION;  Surgeon: Gabriel Hawley MD;  Location: [facility] EP LAB;  Service: Cardiology;  Laterality: N/A;  af, demi, dccv, anes, mb, 345    TREATMENT OF CARDIAC ARRHYTHMIA  2022    Procedure: Cardioversion or Defibrillation;  Surgeon: Gabriel Hawley MD;  Location: [facility] EP LAB;  Service: Cardiology;;    WOUND DEBRIDEMENT Left 2020    Procedure: DEBRIDEMENT, WOUND;  Surgeon: SEMAJ Márquez III, MD;  Location: [facility] OR [address] [place] [address];  Service: Peripheral Vascular;  Laterality: Left;     Family History   Problem Relation Age of Onset    Cataracts Mother     Hypertension Mother     Thyroid disease Mother     Diabetes Father     Hypertension Father     Hypertension Sister     Hypertension Brother     Amblyopia Neg Hx     Blindness Neg Hx     Cancer Neg Hx     Glaucoma Neg Hx     Macular degeneration Neg Hx     Retinal detachment Neg Hx     Strabismus Neg Hx     Stroke Neg Hx      Social History     Tobacco Use    Smoking status: Former Smoker     Types: Cigarettes     Quit date: 2019     Years since quittin.8    Smokeless tobacco: Never Used   Substance Use Topics    Alcohol use: No    Drug use: No        Review of Systems:  Patient denies constitutional symptoms, cardiac symptoms, respiratory symptoms, GI symptoms.  The remainder of the musculoskeletal ROS is included in the HPI.      OBJECTIVE:     Vital Signs (Most Recent)  Pulse: 92 (22 1552)  BP: (!) 143/74 (22 1552)    Physical Exam:  Gen:  No acute distress  CV:  Peripherally well-perfused.  Pulses 2+ bilaterally.  Lungs:  Normal respiratory effort.  Abdomen:  Soft, non-tender, non-distended  Head/Neck:  Normocephalic.  Atraumatic. No TTP,  AROM and PROM intact without pain  Neuro:  CN intact without deficit, SILT throughout B/L Upper & Lower Extremities      MSK:  Left lower extremity:  Minimal tenderness to palpation of the lateral aspect of the proximal tibia/knee.  Her erythema from her cellulitis has completely resolved and there is no warmth.  No tenderness to palpation over the prepatellar bursa.  Active motion equals passive motion from 0-130 degrees.  No areas of fluctuance palpated.        ASSESSMENT/PLAN:     A/P: Vicky Alvarado is a 57 y.o. with left leg cellulitis and prepatellar bursitis that has resolved with IV antibiotics and discharge with oral antibiotics.          Plan:  She will complete her antibiotic course.  We will refill her prescription for pain medicine, last prescription today.  Physical therapy was offered, however was denied.  She will continue a self-directed home exercise program.  I did encourage use of compression stockings and she voiced understanding.  She will return to clinic on an as-needed basis.    All of their questions were answered.  They will call the clinic with any questions or concerns in the interim.    Should the patient's symptoms worsen, persist, or fail to improve they should return for reevaluation and I would be happy to see them back anytime.        Dre Guzmán M.D.     Please be aware that this note has been generated with the assistance of MModal voice-to-text.  Please excuse any spelling or grammatical errors.    Thank you for choosing Dr. Dre Guzmán for your sports medicine care. It is our goal to provide you with exceptional care that will help keep you healthy, active, and get you back in the game.     If you felt that you received exemplary care today, please consider leaving feedback for Dr. Guzmán on [email or web address] at [email or web address].    Please do not hesitate to reach out to us via email, phone, or MyChart with any questions,  concerns, or feedback.

## 2022-05-20 ENCOUNTER — TELEPHONE (OUTPATIENT)
Dept: OBSTETRICS AND GYNECOLOGY | Facility: CLINIC | Age: 57
End: 2022-05-20
Payer: COMMERCIAL

## 2022-05-20 ENCOUNTER — PATIENT MESSAGE (OUTPATIENT)
Dept: OBSTETRICS AND GYNECOLOGY | Facility: CLINIC | Age: 57
End: 2022-05-20
Payer: COMMERCIAL

## 2022-05-20 NOTE — TELEPHONE ENCOUNTER
Spoke with pt  Pt scheduled for surgery consult with Dr Barriga for next week.  Pt verbalized understanding.

## 2022-05-24 NOTE — PROGRESS NOTES
HISTORY OF PRESENT ILLNESS:    Vicky Alvarado is a 57 y.o. female, No obstetric history on file., Patient's last menstrual period was 03/25/2022 (exact date).,  presents for hysterectomy consult    Patient with hx Afib on Eloquis.  S/p ablation x 2 (last one 1/22)    2022  sudden onset of PMPB.  S/p transfusion & IV estrogen.  2/22 D&C benign polyp.  No bleeding after D&C.  Was placed on progesterone taper & restarted Eloquis a few days after D&C.    Currently on progesterone 4x/d and still bleeding on/off    U/S:  11 cm uterus with 3 cm fibroid.  Ovaries not seen    Patient reports increasing SOB with exertion.  Last Echo 6/21  Recently admitted for leg cellulitis.  Has finished abx and improved clinically      Past Medical History:   Diagnosis Date    Anticoagulant long-term use     Arthritis     Atrial fibrillation     cardioversion    Atrial fibrillation with RVR     Avascular necrosis     L hand    CHF (congestive heart failure)     Depression     Encounter for blood transfusion 07/22/2020    GERD (gastroesophageal reflux disease)     Hx of psychiatric care     Hypertension     Iron deficiency anemia 5/7/2022    TIBC 444 with saturated iron 8    Obese     Pre-diabetes 5/7/2022    Psychiatric problem     Sleep apnea        Past Surgical History:   Procedure Laterality Date    ABLATION OF ARRHYTHMOGENIC FOCUS FOR ATRIAL FIBRILLATION N/A 7/20/2020    Procedure: Ablation atrial fibrillation;  Surgeon: Gbariel Hawley MD;  Location: [facility] EP LAB;  Service: Cardiology;  Laterality: N/A;  afib, PVI, RFA, REENA, SHADY, anes, MB, 3 Prep    ABLATION OF ARRHYTHMOGENIC FOCUS FOR ATRIAL FIBRILLATION N/A 1/24/2022    Procedure: Ablation atrial fibrillation;  Surgeon: Gabriel Hawley MD;  Location: [facility] EP LAB;  Service: Cardiology;  Laterality: N/A;  afib/afl, PVI (re-do)/CTI, RFA, REENA (cx if SR), SHADY, anes, MB, 3 Prep    APPLICATION OF WOUND VACUUM-ASSISTED CLOSURE DEVICE Left 8/3/2020     Procedure: APPLICATION, WOUND VAC;  Surgeon: SEMAJ Márquez III, MD;  [facility] OR 2ND FLR;  Service: Peripheral Vascular;  Laterality: Left;    APPLICATION OF WOUND VACUUM-ASSISTED CLOSURE DEVICE Left 8/6/2020    Procedure: APPLICATION, WOUND VAC;  Surgeon: SEMAJ Márquez III, MD;  Location: [facility] OR 2ND FLR;  Service: Peripheral Vascular;  Laterality: Left;    CARPAL TUNNEL RELEASE Right 6/10/2020    Procedure: RELEASE, CARPAL TUNNEL - RIGHT;  Surgeon: Adelaida Hall MD;  Location: [facility];  Service: Orthopedics;  Laterality: Right;  GENERAL AND REGIONAL    CARPAL TUNNEL RELEASE Left 5/5/2021    Procedure: RELEASE, CARPAL TUNNEL, LEFT;  Surgeon: Adelaida Hall MD;  Location: [facility] OR;  Service: Orthopedics;  Laterality: Left;  GENERAL & REGIONAL IN PACU    CLOSURE OF WOUND Left 8/6/2020    Procedure: CLOSURE, WOUND;  Surgeon: SEMAJ Márquez III, MD;  Location: [facility] OR 2ND FLR;  Service: Peripheral Vascular;  Laterality: Left;  Complex    COLONOSCOPY N/A 8/17/2021    Procedure: COLONOSCOPY Suprep;  Surgeon: Loco Deluca MD;  Location: [facility];  Service: Endoscopy;  Laterality: N/A;    ESOPHAGOGASTRODUODENOSCOPY N/A 8/17/2021    Procedure: EGD (ESOPHAGOGASTRODUODENOSCOPY);  Surgeon: Loco Deluca MD;  Location: [facility];  Service: Endoscopy;  Laterality: N/A;  Patient is schedule to have her Covid test on 08/14/2021 at the [facility] @ 9:30am. [place].    EXPLORATION OF FEMORAL ARTERY Left 7/21/2020    Procedure: EXPLORATION, ARTERY, FEMORAL;  Surgeon: SEMAJ Márquez III, MD;  Location: [facility] OR 2ND FLR;  Service: Peripheral Vascular;  Laterality: Left;  1. Urgent Direct repair, L SFA branch laceration    FOOT SURGERY      HYSTEROSCOPY WITH DILATION AND CURETTAGE OF UTERUS N/A 2/19/2022    Procedure: HYSTEROSCOPY, WITH DILATION AND CURETTAGE OF UTERUS;  Surgeon: Shane Palomo MD;  [facility];  Service: OB/GYN;  Laterality: N/A;    INCISION AND  DRAINAGE OF KNEE Left 5/12/2022    Procedure: INCISION AND DRAINAGE, Prepatellar bursectomy.;  Surgeon: Dre Guzmán MD;  Location: [facility] OR [address] [place] [address];  Service: Orthopedics;  Laterality: Left;    TREATMENT OF CARDIAC ARRHYTHMIA N/A 1/29/2020    Procedure: CARDIOVERSION;  Surgeon: Gabriel Hawley MD;  Location: [facility] EP LAB;  Service: Cardiology;  Laterality: N/A;  af, demi, dccv, anes, mb, 345    TREATMENT OF CARDIAC ARRHYTHMIA  1/24/2022    Procedure: Cardioversion or Defibrillation;  Surgeon: Gabriel Hawley MD;  Location: [facility] EP LAB;  Service: Cardiology;;    WOUND DEBRIDEMENT Left 8/6/2020    Procedure: DEBRIDEMENT, WOUND;  Surgeon: SEMAJ Márquez III, MD;  Location: [facility] OR [address] [place] [address];  Service: Peripheral Vascular;  Laterality: Left;       MEDICATIONS AND ALLERGIES:      Current Outpatient Medications:     acetaminophen (TYLENOL) 500 MG tablet, Take 2 tablets (1,000 mg) by mouth at onset of pain or headache, may repeat if needed., Disp: , Rfl:     ALPRAZolam (XANAX) 0.5 MG tablet, Take 1 tablet (0.5 mg total) by mouth 2 (two) times daily as needed for Anxiety., Disp: 60 tablet, Rfl: 4    apixaban (ELIQUIS) 5 mg Tab, Take 1 tablet (5 mg total) by mouth 2 (two) times daily., Disp: 90 tablet, Rfl: 5    atorvastatin (LIPITOR) 40 MG tablet, Take 1 tablet (40 mg total) by mouth once daily., Disp: 90 tablet, Rfl: 3    b complex vitamins capsule, Take 1 capsule by mouth once daily., Disp: , Rfl:     colchicine (COLCRYS) 0.6 mg tablet, For gout attack: Take TWO tablets by mouth, then, 2 hours later, take ONE additional tablet. Then take 1 tablet twice daily only if still needed for gout pain. (Patient taking differently: For gout attack: Take TWO tablets by mouth, then, 2 hours later, take ONE additional tablet. Then take 1 tablet twice daily only if still needed for gout pain.), Disp: 20 tablet, Rfl: 1    DULoxetine (CYMBALTA) 60 MG capsule, Take 2 capsules (120 mg total) by mouth once daily., Disp:  180 capsule, Rfl: 3    ferrous sulfate (SLOW RELEASE IRON) 142 mg (45 mg iron) TbSR, Take 1 tablet (142 mg total) by mouth once daily. FOR 7 DAY THEN INCREASE TO TWICE DAILY AS TOLERATED, Disp: , Rfl:     furosemide (LASIX) 40 MG tablet, Take 1 tablet (40 mg total) by mouth 2 (two) times daily. Resume as needed for edema until followup with your PCP., Disp: 60 tablet, Rfl: 11    gluc-shikha-[facility]#[id number] 750 mg-644 mg- 30 mg-1 mg Tab, Take 1 tablet by mouth once daily. , Disp: , Rfl:     HYDROcodone-acetaminophen (NORCO) 5-325 mg per tablet, Take 1 tablet by mouth every 6 (six) hours as needed for Pain., Disp: 15 tablet, Rfl: 0    krill/om-3/dha/epa/phospho/ast (MEGARED OMEGA-3 KRILL OIL ORAL), Take 1 capsule by mouth once daily., Disp: , Rfl:     lisinopriL (PRINIVIL,ZESTRIL) 40 MG tablet, Take 0.5 tablets (20 mg total) by mouth once daily., Disp: 45 tablet, Rfl: 3    lisinopriL (PRINIVIL,ZESTRIL) 40 MG tablet, Take 1 tablet (40 mg total) by mouth once daily., Disp: 90 tablet, Rfl: 3    melatonin 10 mg Tab, Take 1-2 tablets by mouth nightly as needed (Sleep)., Disp: , Rfl:     metoprolol succinate (TOPROL-XL) 50 MG 24 hr tablet, Take 1 tablet (50 mg total) by mouth 2 (two) times daily., Disp: 180 tablet, Rfl: 3    multivitamin (THERAGRAN) per tablet, Take 1 tablet by mouth once daily., Disp: , Rfl:     NIFEdipine (PROCARDIA-XL) 90 MG (OSM) 24 hr tablet, Take 1 tablet (90 mg total) by mouth once daily. HOLD UNTIL FOLLOW-UP WITH YOUR PCP., Disp: 30 tablet, Rfl: 11    omeprazole (PRILOSEC) 20 MG capsule, TAKE 1 CAPSULE BY MOUTH ONCE DAILY, Disp: 90 capsule, Rfl: 3    ondansetron (ZOFRAN-ODT) 4 MG TbDL, Take 2 tablets (8 mg total) by mouth every 8 (eight) hours as needed (nasuea, vomiting)., Disp: 8 tablet, Rfl: 0    oxyCODONE (ROXICODONE) 5 MG immediate release tablet, Take 1 tablet (5 mg total) by mouth every 4 (four) hours as needed for Pain., Disp: 30 tablet, Rfl: 0    polyethylene glycol  (GLYCOLAX) 17 gram PwPk, Take by mouth as needed (constipation)., Disp: , Rfl:     potassium chloride SA (K-DUR,KLOR-CON) 20 MEQ tablet, Take 1 tablet (20 mEq total) by mouth once daily. ONLY TAKE WITH LASIX., Disp: 30 tablet, Rfl: 11    progesterone (PROMETRIUM) 200 MG capsule, Take 1 capsule (200 mg total) by mouth 2 (two) times a day., Disp: 60 capsule, Rfl: 1    propafenone (RYTHMOL) 225 MG Tab, Take 1 tablet (225 mg total) by mouth every 8 (eight) hours., Disp: 90 tablet, Rfl: 11    traZODone (DESYREL) 100 MG tablet, Take 1 tablet (100 mg total) by mouth nightly as needed for Insomnia., Disp: 90 tablet, Rfl: 3    UNABLE TO FIND, Take 1 tablet by mouth once daily. Trino's Leg Cramps, Disp: , Rfl:   No current facility-administered medications for this visit.    Facility-Administered Medications Ordered in Other Visits:     sodium chloride 0.9% bolus 1,000 mL, 1,000 mL, Intravenous, Once, Laurie Montgomery NP    Review of patient's allergies indicates:   Allergen Reactions    Flecainide Shortness Of Breath and Swelling    Vancomycin analogues Hives, Itching and Rash       Family History   Problem Relation Age of Onset    Cataracts Mother     Hypertension Mother     Thyroid disease Mother     Diabetes Father     Hypertension Father     Hypertension Sister     Hypertension Brother     Amblyopia Neg Hx     Blindness Neg Hx     Cancer Neg Hx     Glaucoma Neg Hx     Macular degeneration Neg Hx     Retinal detachment Neg Hx     Strabismus Neg Hx     Stroke Neg Hx        Social History     Socioeconomic History    Marital status: Single   Tobacco Use    Smoking status: Former Smoker     Types: Cigarettes     Quit date: 2019     Years since quittin.9    Smokeless tobacco: Never Used   Substance and Sexual Activity    Alcohol use: No    Drug use: No       ROS:  GENERAL: No weight changes. No fever.  CARDIOVASCULAR: No chest pain.  No leg cramps.   NEUROLOGICAL: No headaches. No vision  "changes.  BREASTS: No pain. No lumps. No discharge.  ABDOMEN: No pain. No nausea. No vomiting. No diarrhea. No constipation.  REPRODUCTIVE: see above  VULVA: No pain. No lesions. No itching.  VAGINA: No relaxation. No itching. No odor. No discharge. No lesions.  URINARY: No incontinence. No nocturia. No frequency. No dysuria.    BP (!) 156/86   Ht 5' 6" (1.676 m)   Wt 126.6 kg (279 lb 1.6 oz)   LMP 03/25/2022 (Exact Date)   BMI 45.05 kg/m²     PE:  APPEARANCE: Well nourished, well developed, in no acute distress.  ABDOMEN: Soft. No tenderness or masses. No hepatosplenomegaly. No hernias.  BREASTS, FUNDOSCOPIC, RECTAL DEFERRED  PELVIC: External female genitalia without lesions.  Female hair distribution. Adequate perineal body, Normal urethral meatus. Vagina moist and well rugated without lesions or discharge.  No significant cystocele or rectocele present. Cervix pink without lesions, discharge or tenderness. Uterus and adnexa difficult to evaluate due to habitus  EXTREMITIES: +bilateral edema      DIAGNOSIS & PLAN  1. Postmenopausal bleeding     2. Atrial Fibrillation (paroxysmal)             COUNSELING:  D/w patient.  Will f/u with cardiology for pre-op clearance and cornelio-operative Eloquis management        20 minutes addressing problems.  This includes face to face time and non-face to face time preparing to see the patient (eg, review of tests), Obtaining and/or reviewing separately obtained history, Documenting clinical information in the electronic or other health record, Independently interpreting resultsand communicating results to the patient/family/caregiver, or Care coordination.     "

## 2022-05-25 ENCOUNTER — TELEPHONE (OUTPATIENT)
Dept: CARDIOLOGY | Facility: HOSPITAL | Age: 57
End: 2022-05-25
Payer: COMMERCIAL

## 2022-05-25 ENCOUNTER — OFFICE VISIT (OUTPATIENT)
Dept: OBSTETRICS AND GYNECOLOGY | Facility: CLINIC | Age: 57
End: 2022-05-25
Attending: OBSTETRICS & GYNECOLOGY
Payer: COMMERCIAL

## 2022-05-25 VITALS
WEIGHT: 279.13 LBS | HEIGHT: 66 IN | SYSTOLIC BLOOD PRESSURE: 156 MMHG | DIASTOLIC BLOOD PRESSURE: 86 MMHG | BODY MASS INDEX: 44.86 KG/M2

## 2022-05-25 DIAGNOSIS — I48.0 PAF (PAROXYSMAL ATRIAL FIBRILLATION): ICD-10-CM

## 2022-05-25 DIAGNOSIS — N95.0 POSTMENOPAUSAL BLEEDING: Primary | ICD-10-CM

## 2022-05-25 PROCEDURE — 1111F PR DISCHARGE MEDS RECONCILED W/ CURRENT OUTPATIENT MED LIST: ICD-10-PCS | Mod: CPTII,S$GLB,, | Performed by: OBSTETRICS & GYNECOLOGY

## 2022-05-25 PROCEDURE — 4010F ACE/ARB THERAPY RXD/TAKEN: CPT | Mod: CPTII,S$GLB,, | Performed by: OBSTETRICS & GYNECOLOGY

## 2022-05-25 PROCEDURE — 99213 OFFICE O/P EST LOW 20 MIN: CPT | Mod: S$GLB,,, | Performed by: OBSTETRICS & GYNECOLOGY

## 2022-05-25 PROCEDURE — 3008F PR BODY MASS INDEX (BMI) DOCUMENTED: ICD-10-PCS | Mod: CPTII,S$GLB,, | Performed by: OBSTETRICS & GYNECOLOGY

## 2022-05-25 PROCEDURE — 3079F PR MOST RECENT DIASTOLIC BLOOD PRESSURE 80-89 MM HG: ICD-10-PCS | Mod: CPTII,S$GLB,, | Performed by: OBSTETRICS & GYNECOLOGY

## 2022-05-25 PROCEDURE — 1159F MED LIST DOCD IN RCRD: CPT | Mod: CPTII,S$GLB,, | Performed by: OBSTETRICS & GYNECOLOGY

## 2022-05-25 PROCEDURE — 99999 PR PBB SHADOW E&M-EST. PATIENT-LVL III: ICD-10-PCS | Mod: PBBFAC,,, | Performed by: OBSTETRICS & GYNECOLOGY

## 2022-05-25 PROCEDURE — 3079F DIAST BP 80-89 MM HG: CPT | Mod: CPTII,S$GLB,, | Performed by: OBSTETRICS & GYNECOLOGY

## 2022-05-25 PROCEDURE — 3077F SYST BP >= 140 MM HG: CPT | Mod: CPTII,S$GLB,, | Performed by: OBSTETRICS & GYNECOLOGY

## 2022-05-25 PROCEDURE — 3044F PR MOST RECENT HEMOGLOBIN A1C LEVEL <7.0%: ICD-10-PCS | Mod: CPTII,S$GLB,, | Performed by: OBSTETRICS & GYNECOLOGY

## 2022-05-25 PROCEDURE — 99213 PR OFFICE/OUTPT VISIT, EST, LEVL III, 20-29 MIN: ICD-10-PCS | Mod: S$GLB,,, | Performed by: OBSTETRICS & GYNECOLOGY

## 2022-05-25 PROCEDURE — 3077F PR MOST RECENT SYSTOLIC BLOOD PRESSURE >= 140 MM HG: ICD-10-PCS | Mod: CPTII,S$GLB,, | Performed by: OBSTETRICS & GYNECOLOGY

## 2022-05-25 PROCEDURE — 3008F BODY MASS INDEX DOCD: CPT | Mod: CPTII,S$GLB,, | Performed by: OBSTETRICS & GYNECOLOGY

## 2022-05-25 PROCEDURE — 1111F DSCHRG MED/CURRENT MED MERGE: CPT | Mod: CPTII,S$GLB,, | Performed by: OBSTETRICS & GYNECOLOGY

## 2022-05-25 PROCEDURE — 1160F RVW MEDS BY RX/DR IN RCRD: CPT | Mod: CPTII,S$GLB,, | Performed by: OBSTETRICS & GYNECOLOGY

## 2022-05-25 PROCEDURE — 1160F PR REVIEW ALL MEDS BY PRESCRIBER/CLIN PHARMACIST DOCUMENTED: ICD-10-PCS | Mod: CPTII,S$GLB,, | Performed by: OBSTETRICS & GYNECOLOGY

## 2022-05-25 PROCEDURE — 99999 PR PBB SHADOW E&M-EST. PATIENT-LVL III: CPT | Mod: PBBFAC,,, | Performed by: OBSTETRICS & GYNECOLOGY

## 2022-05-25 PROCEDURE — 4010F PR ACE/ARB THEARPY RXD/TAKEN: ICD-10-PCS | Mod: CPTII,S$GLB,, | Performed by: OBSTETRICS & GYNECOLOGY

## 2022-05-25 PROCEDURE — 3044F HG A1C LEVEL LT 7.0%: CPT | Mod: CPTII,S$GLB,, | Performed by: OBSTETRICS & GYNECOLOGY

## 2022-05-25 PROCEDURE — 1159F PR MEDICATION LIST DOCUMENTED IN MEDICAL RECORD: ICD-10-PCS | Mod: CPTII,S$GLB,, | Performed by: OBSTETRICS & GYNECOLOGY

## 2022-05-25 NOTE — TELEPHONE ENCOUNTER
1/30/2020 PET Stress  The relative PET images are normal showing no clinically significant regional resting or stress induced perfusion defects.    Whole heart absolute myocardial perfusion (cc/min/g) averaged 0.47 cc/min/g at rest (which is reduced), 0.93 cc/min/g at stress (which is moderately reduced), and 2.03 CFR (which is mildly reduced).    Gated perfusion images showed an ejection fraction of 78% at rest and 72% during stress. Normal ejection fraction is greater than 51%.    Wall motion was normal at rest and during stress.    LV cavity size is normal at rest and stress.    The EKG portion of this study is negative for ischemia.    There were no arrhythmias during stress.    The patient reported no chest pain during the stress test.    TTE 6/29/21  · The left ventricle is normal in size with concentric remodeling and normal systolic function. The estimated ejection fraction is 60%.  · Normal left ventricular diastolic function.  · Normal right ventricular size with normal right ventricular systolic function.  · Normal central venous pressure (3 mmHg).  · Aortic valve area is 1.63 cm2; peak velocity is 2.63 m/s; mean gradient is 17 mmHg.  · There is mild aortic valve stenosis.  · The estimated ejection fraction is 60%.    Revised Cardiac Risk Index   High risk surgery: No  History of ischemic heart disease: No  History of congestive heart failure: Yes  History of stroke: No  Insulin dependent diabetes: No  Cr > 2: No  1 point, class II risk, 6.0% risk of cardiac event 2014 ACC/AHA Perioperative Guidelines  Known or risk factors for CAD: Yes  High risk of MACE: No  Functional capacity < 4 METs: No, proceed without further testing       Patient has no active cardiac condition (ACS/USA, decompenstated CHF, significant arrhythmias or severe valvular disease). Additionally, the patient does not require further cardiac evaluation prior to undergoing surgery as he/she can easily achieve 4 METS, Class IIB  Recommendation.    - Patient is undergoing an elective procedure.       -Pt should remain on beta-blockers (if applicable) throughout the entire cornelio-procedure time period.  Aspirin therapy (if applicable) can be held but should be restarted as soon as safely possible.      - Anticoagulation (if applicable): Per operatoring physician discretion. Typically, VKA-Warfarin is held 5 days prior to procedure, Direct Xa inhibitors- Apixaban, Rivaroxaban are held 48 hours prior to procedure except neuraxial procedures which require holding 3-5 days. If [id number] score ?7 or had recent CVA/TIA (in the last 3 months), then recommend bridging with heparin. Resume as soon as possible post procedure.      The remaining cardiac medications can be held as needed but should also be restarted after the procedure. These recommendations follow the most current Guideline on Perioperative Cardiovascular Evaluation and Management of Patients Undergoing Noncardiac Surgery released by the ACC/AHA (JACC 2014.07.944).

## 2022-05-27 ENCOUNTER — TELEPHONE (OUTPATIENT)
Dept: BARIATRICS | Facility: CLINIC | Age: 57
End: 2022-05-27
Payer: COMMERCIAL

## 2022-05-27 NOTE — TELEPHONE ENCOUNTER
Patient was scheduled for appts for consultation;however, cancelled appts.  Called pt back to see if she was interested in rescheduling.  Patient stated that she decided not to proceed with surgical wt loss at this time.  Discussed medical wt loss option- she stated she has a hysterectomy surgery scheduled and will consider the medical wt loss option and call us back if interested.  No further follow up needed.

## 2022-05-27 NOTE — TELEPHONE ENCOUNTER
----- Message from Darryl Pires Jr., MD sent at 7/29/2021  7:14 AM CDT -----  Can we look at getting this patient in sooner for an eval.  She works in the OR and was asking me about it.  Let me know what you think.  Thanks.

## 2022-05-30 ENCOUNTER — TELEPHONE (OUTPATIENT)
Dept: OBSTETRICS AND GYNECOLOGY | Facility: CLINIC | Age: 57
End: 2022-05-30
Payer: COMMERCIAL

## 2022-05-30 ENCOUNTER — PATIENT MESSAGE (OUTPATIENT)
Dept: OBSTETRICS AND GYNECOLOGY | Facility: CLINIC | Age: 57
End: 2022-05-30
Payer: COMMERCIAL

## 2022-05-30 DIAGNOSIS — N95.0 POSTMENOPAUSAL BLEEDING: Primary | ICD-10-CM

## 2022-06-13 PROBLEM — T81.30XA WOUND DEHISCENCE: Status: RESOLVED | Noted: 2020-08-02 | Resolved: 2022-06-13

## 2022-06-15 ENCOUNTER — OUTPATIENT CASE MANAGEMENT (OUTPATIENT)
Dept: ADMINISTRATIVE | Facility: OTHER | Age: 57
End: 2022-06-15
Payer: COMMERCIAL

## 2022-06-15 NOTE — PROGRESS NOTES
Outpatient Care Management  Initial Patient Assessment    Patient: Vicky Alvarado  MRN: [id number]  Date of Service: 06/16/2022  Completed by: Rosalina Herr RN  Referral Date: 05/10/2022  Program: High Risk  Status: Ongoing  Effective Dates: 6/16/2022 - present  Responsible Staff: Rosalina Herr RN        Reason for Visit   Patient presents with    OPCM Chart Review     6/16/22    OPCM Enrollment Call     6/16/22    Initial Assessment    PHQ-9    Plan Of Care       Brief Summary:  Vicky Alvarado was referred by Gil for paroxysmal atrial fib, Type II DM, morbid obesity, and exertional dyspnea . Patient qualifies for program based on risk score of 62.7%.   Active problem list, medical, surgical and social history reviewed. Active comorbidities include CHF, anemia, dyspnea . Areas of need identified by patient include swelling to BLE and SOB   Complex care plan created with patient/caregiver input. By next encounter, patient agrees to discuss management of CHF further.     Assessment Documentation     OPCM Initial Assessment    Involvement of Care  Do I have permission to speak with other family members about your care?: Yes (Comment: sister Zully)  Assessment completed by: Patient  Identified Areas of Need  Advanced Care Planning: Yes  Housing: no  Medication Adherence: No  *Active medication list was reviewed and reconciled with patient and/or caregiver:   Nutrition: no  Lab Adherence: no  Depression: No  Cognitive/Behavioral Health: no  Communication: no  Health Literacy: no  Fall risk?: Yes  Equipment/Supplies/Services: no          Problem List and History     Problems Addressed This Visit    Atrial Fibrillation: Not identified by patient as current problem  Depression: Not identified by patient as current problem  Heart Failure: Identified as current problem  Hypertension: Not identified by patient as current problem  Diabetes: Not identified by patient as current problem  Anemia:  Identified as current problem  Chronic Kidney Disease: Not identified by patient as current problem  Hyperlipidemia: Not identified by patient as current problem  Heart Disease: Not identified by patient as current problem         Reviewed medical and social history with patient and/or caregiver. A complex care plan was discussed and completed today, with input from patient and/or caregiver.    Patient Instructions     Instructions were provided via the [employer] patient resources and are available for the patient to view on the patient portal, if active.    Next steps: Discuss daily weights with pt    Follow up in about 8 days (around 6/23/2022).    Todays OPCM Self-Management Care Plan was developed with the patients/caregivers input and was based on identified barriers from todays assessment.  Goals were written today with the patient/caregiver and the patient has agreed to work towards these goals to improve his/her overall well-being. Patient verbalized understanding of the care plan, goals, and all of today's instructions. Encouraged patient/caregiver to communicate with his/her physician and health care team about health conditions and the treatment plan.  Provided my contact information today and encouraged patient/caregiver to call me with any questions as needed.

## 2022-06-15 NOTE — LETTER
June 16, 2022             Dear Ms. Christiano,    Welcome to [facility]s Complex Care Management Program.  It was a pleasure talking with you today.  My name is Rosalina Herr RN and I look forward to being your Care Manager.  My goal is to help you function at the healthiest and highest level possible.  You can contact me directly at [phone].    As an [facility] patient, some of the services we may be able to provide include:      Development of an individualized care plan with a Registered Nurse    Connection with a    Connection with available resources and services     Coordinate communication among your care team members    Provide coaching and education    Help you understand your doctors treatment plan   Help you obtain information about your insurance coverage.     All services provided by [facility]s Complex Care Managers and other care team members are coordinated with and communicated to your primary care team.      As part of your enrollment, you will be receiving education materials and more information about these services in your [facility] account, by phone or through the mail.  If you do not wish to participate or receive information, please contact our office at [phone].      Sincerely,        Rosalina Herr RN  [facility]   Out-patient RN Complex Care Manager

## 2022-06-16 ENCOUNTER — PATIENT MESSAGE (OUTPATIENT)
Dept: ADMINISTRATIVE | Facility: OTHER | Age: 57
End: 2022-06-16
Payer: COMMERCIAL

## 2022-06-22 ENCOUNTER — PATIENT MESSAGE (OUTPATIENT)
Dept: SPORTS MEDICINE | Facility: CLINIC | Age: 57
End: 2022-06-22
Payer: COMMERCIAL

## 2022-06-23 ENCOUNTER — OUTPATIENT CASE MANAGEMENT (OUTPATIENT)
Dept: ADMINISTRATIVE | Facility: OTHER | Age: 57
End: 2022-06-23

## 2022-06-23 ENCOUNTER — PATIENT OUTREACH (OUTPATIENT)
Dept: ADMINISTRATIVE | Facility: OTHER | Age: 57
End: 2022-06-23
Payer: COMMERCIAL

## 2022-06-23 NOTE — PROGRESS NOTES
CHW - Case Closure    This Community Health Worker spoke to patient via telephone today.   Pt/Caregiver reported: pt reported they don't need any outside resources at this time.  Pt/Caregiver denied any additional needs at this time and agrees with episode closure at this time.  Provided patient with Community Health Worker's contact information and encouraged him/her to contact this Community Health Worker if additional needs arise.

## 2022-06-27 ENCOUNTER — LAB VISIT (OUTPATIENT)
Dept: LAB | Facility: HOSPITAL | Age: 57
End: 2022-06-27
Payer: COMMERCIAL

## 2022-06-27 DIAGNOSIS — I48.0 PAF (PAROXYSMAL ATRIAL FIBRILLATION): ICD-10-CM

## 2022-06-27 DIAGNOSIS — R53.82 CHRONIC FATIGUE: ICD-10-CM

## 2022-06-27 LAB
ALBUMIN SERPL BCP-MCNC: 3.8 G/DL (ref 3.5–5.2)
ALP SERPL-CCNC: 89 U/L (ref 55–135)
ALT SERPL W/O P-5'-P-CCNC: 15 U/L (ref 10–44)
ANION GAP SERPL CALC-SCNC: 17 MMOL/L (ref 8–16)
AST SERPL-CCNC: 40 U/L (ref 10–40)
BASOPHILS # BLD AUTO: 0.04 K/UL (ref 0–0.2)
BASOPHILS NFR BLD: 0.7 % (ref 0–1.9)
BILIRUB SERPL-MCNC: 1.1 MG/DL (ref 0.1–1)
BUN SERPL-MCNC: 10 MG/DL (ref 6–20)
CALCIUM SERPL-MCNC: 9.3 MG/DL (ref 8.7–10.5)
CHLORIDE SERPL-SCNC: 102 MMOL/L (ref 95–110)
CO2 SERPL-SCNC: 20 MMOL/L (ref 23–29)
CREAT SERPL-MCNC: 1.1 MG/DL (ref 0.5–1.4)
DIFFERENTIAL METHOD: ABNORMAL
EOSINOPHIL # BLD AUTO: 0.1 K/UL (ref 0–0.5)
EOSINOPHIL NFR BLD: 2.4 % (ref 0–8)
ERYTHROCYTE [DISTWIDTH] IN BLOOD BY AUTOMATED COUNT: 18 % (ref 11.5–14.5)
EST. GFR  (AFRICAN AMERICAN): >60 ML/MIN/1.73 M^2
EST. GFR  (NON AFRICAN AMERICAN): 55.5 ML/MIN/1.73 M^2
GLUCOSE SERPL-MCNC: 116 MG/DL (ref 70–110)
HCT VFR BLD AUTO: 29.9 % (ref 37–48.5)
HGB BLD-MCNC: 9.4 G/DL (ref 12–16)
IMM GRANULOCYTES # BLD AUTO: 0.02 K/UL (ref 0–0.04)
IMM GRANULOCYTES NFR BLD AUTO: 0.3 % (ref 0–0.5)
LYMPHOCYTES # BLD AUTO: 0.8 K/UL (ref 1–4.8)
LYMPHOCYTES NFR BLD: 14.2 % (ref 18–48)
MCH RBC QN AUTO: 27.1 PG (ref 27–31)
MCHC RBC AUTO-ENTMCNC: 31.4 G/DL (ref 32–36)
MCV RBC AUTO: 86 FL (ref 82–98)
MONOCYTES # BLD AUTO: 0.6 K/UL (ref 0.3–1)
MONOCYTES NFR BLD: 9.8 % (ref 4–15)
NEUTROPHILS # BLD AUTO: 4.3 K/UL (ref 1.8–7.7)
NEUTROPHILS NFR BLD: 72.6 % (ref 38–73)
NRBC BLD-RTO: 0 /100 WBC
PLATELET # BLD AUTO: 151 K/UL (ref 150–450)
PMV BLD AUTO: 11.6 FL (ref 9.2–12.9)
POTASSIUM SERPL-SCNC: 2.8 MMOL/L (ref 3.5–5.1)
PROT SERPL-MCNC: 7.5 G/DL (ref 6–8.4)
RBC # BLD AUTO: 3.47 M/UL (ref 4–5.4)
SODIUM SERPL-SCNC: 139 MMOL/L (ref 136–145)
WBC # BLD AUTO: 5.93 K/UL (ref 3.9–12.7)

## 2022-06-27 PROCEDURE — 36415 COLL VENOUS BLD VENIPUNCTURE: CPT | Performed by: INTERNAL MEDICINE

## 2022-06-27 PROCEDURE — 80053 COMPREHEN METABOLIC PANEL: CPT | Performed by: INTERNAL MEDICINE

## 2022-06-27 PROCEDURE — 85025 COMPLETE CBC W/AUTO DIFF WBC: CPT | Performed by: INTERNAL MEDICINE

## 2022-06-29 ENCOUNTER — TELEPHONE (OUTPATIENT)
Dept: SPORTS MEDICINE | Facility: CLINIC | Age: 57
End: 2022-06-29
Payer: COMMERCIAL

## 2022-06-29 ENCOUNTER — PATIENT MESSAGE (OUTPATIENT)
Dept: SPORTS MEDICINE | Facility: CLINIC | Age: 57
End: 2022-06-29
Payer: COMMERCIAL

## 2022-06-29 NOTE — TELEPHONE ENCOUNTER
Spoke with patient and asked if these [employer] forms were different than the one Dr. Cordero filled out. Pt said those forms are different they are for accommodations for something else going on. I let her know we have her paperwork and as soon as we can fill it out we will fax it back to [employer] and send her a copy through the portal. Pt states understanding and appreciation.

## 2022-07-13 ENCOUNTER — PATIENT MESSAGE (OUTPATIENT)
Dept: OBSTETRICS AND GYNECOLOGY | Facility: CLINIC | Age: 57
End: 2022-07-13
Payer: COMMERCIAL

## 2022-07-13 DIAGNOSIS — N95.0 POSTMENOPAUSAL BLEEDING: ICD-10-CM

## 2022-07-13 RX ORDER — MEDROXYPROGESTERONE ACETATE 10 MG/1
20 TABLET ORAL 3 TIMES DAILY
Qty: 180 TABLET | Refills: 0 | Status: SHIPPED | OUTPATIENT
Start: 2022-07-13 | End: 2022-08-09

## 2022-07-13 RX ORDER — PROGESTERONE 200 MG/1
200 CAPSULE ORAL 2 TIMES DAILY
Qty: 60 CAPSULE | Refills: 0 | Status: SHIPPED | OUTPATIENT
Start: 2022-07-13 | End: 2022-07-13

## 2022-07-21 ENCOUNTER — TELEPHONE (OUTPATIENT)
Dept: OBSTETRICS AND GYNECOLOGY | Facility: CLINIC | Age: 57
End: 2022-07-21
Payer: COMMERCIAL

## 2022-07-21 NOTE — TELEPHONE ENCOUNTER
----- Message from Swapna Condon sent at 7/21/2022 11:54 AM CDT -----  Patient called saying she has an upcoming surgery with  but was fired from her job on last week so she does not have insurance at this time but will be getting COBRA.  She would like a phone call to discuss if surgery can be postponed until COBRA kicks in, in about 2 weeks.  She can be reached at [phone]

## 2022-07-22 ENCOUNTER — TELEPHONE (OUTPATIENT)
Dept: OBSTETRICS AND GYNECOLOGY | Facility: CLINIC | Age: 57
End: 2022-07-22
Payer: COMMERCIAL

## 2022-07-22 NOTE — TELEPHONE ENCOUNTER
lvm  Dr Barriga out for 8/9/22 for PreOp  RS pt to 8/2/22 for Pre op with Nithya appt for Pre admit to stay on 8/9/22

## 2022-07-27 ENCOUNTER — TELEPHONE (OUTPATIENT)
Dept: OBSTETRICS AND GYNECOLOGY | Facility: CLINIC | Age: 57
End: 2022-07-27
Payer: COMMERCIAL

## 2022-08-02 ENCOUNTER — TELEPHONE (OUTPATIENT)
Dept: OBSTETRICS AND GYNECOLOGY | Facility: CLINIC | Age: 57
End: 2022-08-02
Payer: COMMERCIAL

## 2022-08-02 NOTE — TELEPHONE ENCOUNTER
Spoke with pt  Pt will let office know by 8/4/22 if she has [employer] insurance coverage for upcomming surgery on 8/9/22.  Pt will then be scheduled for preop with Nithya on 8/8/22.    Pt verbalized understanding.

## 2022-08-03 ENCOUNTER — PATIENT MESSAGE (OUTPATIENT)
Dept: SURGERY | Facility: OTHER | Age: 57
End: 2022-08-03
Payer: COMMERCIAL

## 2022-08-04 ENCOUNTER — TELEPHONE (OUTPATIENT)
Dept: OBSTETRICS AND GYNECOLOGY | Facility: CLINIC | Age: 57
End: 2022-08-04
Payer: COMMERCIAL

## 2022-08-04 ENCOUNTER — PATIENT MESSAGE (OUTPATIENT)
Dept: OBSTETRICS AND GYNECOLOGY | Facility: CLINIC | Age: 57
End: 2022-08-04
Payer: COMMERCIAL

## 2022-08-05 ENCOUNTER — ANESTHESIA EVENT (OUTPATIENT)
Dept: SURGERY | Facility: OTHER | Age: 57
DRG: 982 | End: 2022-08-05
Payer: COMMERCIAL

## 2022-08-05 ENCOUNTER — HOSPITAL ENCOUNTER (OUTPATIENT)
Dept: PREADMISSION TESTING | Facility: OTHER | Age: 57
Discharge: HOME | End: 2022-08-05
Attending: OBSTETRICS & GYNECOLOGY
Payer: COMMERCIAL

## 2022-08-05 VITALS
RESPIRATION RATE: 18 BRPM | HEART RATE: 84 BPM | TEMPERATURE: 97 F | SYSTOLIC BLOOD PRESSURE: 130 MMHG | DIASTOLIC BLOOD PRESSURE: 72 MMHG | BODY MASS INDEX: 49.59 KG/M2 | HEIGHT: 65 IN | OXYGEN SATURATION: 99 %

## 2022-08-05 DIAGNOSIS — Z01.818 PREOP TESTING: Primary | ICD-10-CM

## 2022-08-05 LAB
ABO + RH BLD: NORMAL
ANION GAP SERPL CALC-SCNC: 11 MMOL/L (ref 8–16)
BASOPHILS # BLD AUTO: 0.06 K/UL (ref 0–0.2)
BASOPHILS NFR BLD: 1 % (ref 0–1.9)
BLD GP AB SCN CELLS X3 SERPL QL: NORMAL
BUN SERPL-MCNC: 9 MG/DL (ref 6–20)
CALCIUM SERPL-MCNC: 9 MG/DL (ref 8.7–10.5)
CHLORIDE SERPL-SCNC: 104 MMOL/L (ref 95–110)
CO2 SERPL-SCNC: 26 MMOL/L (ref 23–29)
CREAT SERPL-MCNC: 0.8 MG/DL (ref 0.5–1.4)
DIFFERENTIAL METHOD: ABNORMAL
EOSINOPHIL # BLD AUTO: 0.3 K/UL (ref 0–0.5)
EOSINOPHIL NFR BLD: 4.1 % (ref 0–8)
ERYTHROCYTE [DISTWIDTH] IN BLOOD BY AUTOMATED COUNT: 18.3 % (ref 11.5–14.5)
EST. GFR  (NO RACE VARIABLE): >60 ML/MIN/1.73 M^2
GLUCOSE SERPL-MCNC: 99 MG/DL (ref 70–110)
HCT VFR BLD AUTO: 33.5 % (ref 37–48.5)
HGB BLD-MCNC: 9.9 G/DL (ref 12–16)
IMM GRANULOCYTES # BLD AUTO: 0.03 K/UL (ref 0–0.04)
IMM GRANULOCYTES NFR BLD AUTO: 0.5 % (ref 0–0.5)
LYMPHOCYTES # BLD AUTO: 0.9 K/UL (ref 1–4.8)
LYMPHOCYTES NFR BLD: 15.1 % (ref 18–48)
MCH RBC QN AUTO: 24.9 PG (ref 27–31)
MCHC RBC AUTO-ENTMCNC: 29.6 G/DL (ref 32–36)
MCV RBC AUTO: 84 FL (ref 82–98)
MONOCYTES # BLD AUTO: 0.6 K/UL (ref 0.3–1)
MONOCYTES NFR BLD: 9.4 % (ref 4–15)
NEUTROPHILS # BLD AUTO: 4.3 K/UL (ref 1.8–7.7)
NEUTROPHILS NFR BLD: 69.9 % (ref 38–73)
NRBC BLD-RTO: 0 /100 WBC
PLATELET # BLD AUTO: 228 K/UL (ref 150–450)
PMV BLD AUTO: 10.7 FL (ref 9.2–12.9)
POTASSIUM SERPL-SCNC: 3.6 MMOL/L (ref 3.5–5.1)
RBC # BLD AUTO: 3.98 M/UL (ref 4–5.4)
SODIUM SERPL-SCNC: 141 MMOL/L (ref 136–145)
WBC # BLD AUTO: 6.08 K/UL (ref 3.9–12.7)

## 2022-08-05 PROCEDURE — 86920 COMPATIBILITY TEST SPIN: CPT | Performed by: STUDENT IN AN ORGANIZED HEALTH CARE EDUCATION/TRAINING PROGRAM

## 2022-08-05 PROCEDURE — 80048 BASIC METABOLIC PNL TOTAL CA: CPT | Performed by: ANESTHESIOLOGY

## 2022-08-05 PROCEDURE — 85025 COMPLETE CBC W/AUTO DIFF WBC: CPT | Performed by: ANESTHESIOLOGY

## 2022-08-05 PROCEDURE — 86850 RBC ANTIBODY SCREEN: CPT | Performed by: ANESTHESIOLOGY

## 2022-08-05 PROCEDURE — 36415 COLL VENOUS BLD VENIPUNCTURE: CPT | Performed by: ANESTHESIOLOGY

## 2022-08-05 RX ORDER — LIDOCAINE HYDROCHLORIDE 10 MG/ML
0.5 INJECTION, SOLUTION EPIDURAL; INFILTRATION; INTRACAUDAL; PERINEURAL ONCE
Status: CANCELLED | OUTPATIENT
Start: 2022-08-05 | End: 2022-08-05

## 2022-08-05 RX ORDER — ACETAMINOPHEN 500 MG
1000 TABLET ORAL
Status: CANCELLED | OUTPATIENT
Start: 2022-08-05 | End: 2022-08-05

## 2022-08-05 RX ORDER — PREGABALIN 50 MG/1
50 CAPSULE ORAL ONCE
Status: CANCELLED | OUTPATIENT
Start: 2022-08-05 | End: 2022-08-05

## 2022-08-05 RX ORDER — SODIUM CHLORIDE, SODIUM LACTATE, POTASSIUM CHLORIDE, CALCIUM CHLORIDE 600; 310; 30; 20 MG/100ML; MG/100ML; MG/100ML; MG/100ML
INJECTION, SOLUTION INTRAVENOUS CONTINUOUS
Status: CANCELLED | OUTPATIENT
Start: 2022-08-05

## 2022-08-05 NOTE — DISCHARGE INSTRUCTIONS
Information to Prepare you for your Surgery    PRE-ADMIT TESTING -  [phone]    [address]          Your surgery has been scheduled at [facility]. We are pleased to have the opportunity to serve you. For Further Information please call [phone].    On the day of surgery please report to the Information Desk on the 1st floor.    CONTACT YOUR PHYSICIAN'S OFFICE THE DAY PRIOR TO YOUR SURGERY TO OBTAIN YOUR ARRIVAL TIME.     The evening before surgery do not eat anything after 9 p.m. ( this includes hard candy, chewing gum and mints).  You may only have GATORADE, POWERADE AND WATER  from 9 p.m. until you leave your home.   DO NOT DRINK ANY LIQUIDS ON THE WAY TO THE HOSPITAL.      Why does your anesthesiologist allow you to drink Gatorade/Powerade before surgery?  Gatorade/Powerade helps to increase your comfort before surgery and to decrease your nausea after surgery. The carbohydrates in Gatorade/Powerade help reduce your body's stress response to surgery.  If you are a diabetic-drink only water prior to surgery.      Current Visitor policy(12/27/2021) - Patients may have 2 visitors pre and post procedure. Only 2 visitors will be allowed in the Surgical building with the patient.     SPECIAL MEDICATION INSTRUCTIONS: TAKE medications checked off by the Anesthesiologist on your Medication List.    Angiogram Patients: Take medications as instructed by your physician, including aspirin.     Surgery Patients:    If you take ASPIRIN - Your PHYSICIAN/SURGEON will need to inform you IF/OR when you need to stop taking aspirin prior to your surgery.     Do Not take any medications containing IBUPROFEN.    Do Not Wear any make-up (especially eye make-up) to surgery. Please remove any false eyelashes or eyelash extensions. If you arrive the day of surgery with makeup/eyelashes on you will be required to remove prior to surgery. (There is a risk of corneal  abrasions if eye makeup/eyelash extensions are not removed)      Leave all valuables at home.   Do Not wear any jewelry or watches, including any metal in body piercings. Jewelry must be removed prior to coming to the hospital.  There is a possibility that rings that are unable to be removed may be cut off if they are on the surgical extremity.    Please remove all hair extensions, wigs, clips and any other metal accessories/ ornaments from your hair.  These items may pose a flammable/fire risk in Surgery and must be removed.    Do not shave your surgical area at least 5 days prior to your surgery. The surgical prep will be performed at the hospital according to Infection Control regulations.    Contact Lens must be removed before surgery. Either do not wear the contact lens or bring a case and solution for storage.  Please bring a container for eyeglasses or dentures as required.  Bring any paperwork your physician has provided, such as consent forms,  history and physicals, doctor's orders, etc.   Bring comfortable clothes that are loose fitting to wear upon discharge. Take into consideration the type of surgery being performed.  Maintain your diet as advised per your physician the day prior to surgery.      Adequate rest the night before surgery is advised.   Park in the Parking lot behind the hospital or in the [employer] [facility] across the street from the parking lot. Parking is complimentary.  If you will be discharged the same day as your procedure, please arrange for a responsible adult to drive you home or to accompany you if traveling by taxi.   YOU WILL NOT BE PERMITTED TO DRIVE OR TO LEAVE THE HOSPITAL ALONE AFTER SURGERY.   If you are being discharged the same day, it is strongly recommended that you arrange for someone to remain with you for the first 24 hrs following your surgery.    The Surgeon will speak to your family/visitor after your surgery regarding the outcome of your surgery and post op  care.  The Surgeon may speak to you after your surgery, but there is a possibility you may not remember the details.  Please check with your family members regarding the conversation with the Surgeon.    We strongly recommend whoever is bringing you home be present for discharge instructions.  This will ensure a thorough understanding for your post op home care.    ALL CHILDREN MUST ALWAYS BE ACCOMPANIED BY AN ADULT.    Visitors-Refer to current Visitor policy handouts.    Thank you for your cooperation.  The Staff of [facility].            Bathing Instructions with Hibiclens    Shower the evening before and morning of your procedure with Hibiclens:  Wash your face with water and your regular face wash/soap  Apply Hibiclens directly on your skin or on a wet washcloth and wash gently. When showering: Move away from the shower stream when applying Hibiclens to avoid rinsing off too soon.  Rinse thoroughly with warm water  Do not dilute Hibiclens        Dry off as usual, do not use any deodorant, powder, body lotions, perfume, after shave or cologne.

## 2022-08-05 NOTE — ANESTHESIA PREPROCEDURE EVALUATION
08/05/2022  Vicky Alvarado is a 57 y.o., female.      Pre-op Assessment    I have reviewed the Patient Summary Reports.     I have reviewed the Nursing Notes. I have reviewed the NPO Status.   I have reviewed the Medications.     Review of Systems  Anesthesia Hx:  Denies Family Hx of Anesthesia complications.   Denies Personal Hx of Anesthesia complications.   Social:  Non-Smoker    Hematology/Oncology:     Oncology Normal    -- Anemia: Hematology Comments: 2/22 heavy vag bleeding, rec'd 6 units PRBCs and levo qqt    hyst delayed by intervening septic arthritis    Last hb 9.4    EENT/Dental:EENT/Dental Normal   Cardiovascular:   Hypertension Valvular problems/Murmurs (mild), AS Dysrhythmias atrial fibrillation CHF (with Afib) hyperlipidemia ECG has been reviewed. Currently in SR since second ablation, still on eliquis and rythmol    Echo 2021:  Summary    · The left ventricle is normal in size with concentric remodeling and normal systolic function. The estimated ejection fraction is 60%.  · Normal left ventricular diastolic function.  · Normal right ventricular size with normal right ventricular systolic function.  · Normal central venous pressure (3 mmHg).  · Aortic valve area is 1.63 cm2; peak velocity is 2.63 m/s; mean gradient is 17 mmHg.  · There is mild aortic valve stenosis.  · The estimated ejection fraction is 60%.    Hx L fem art laceration during ablation, required surgery and subsequent infection/dehis   Pulmonary:   Sleep Apnea, CPAP    Renal/:  Renal/ Normal     Hepatic/GI:   GERD    Musculoskeletal:   5/22 septic arthritis knee    Gout on prn prednisone   Neurological:  Neurology Normal    Endocrine:   Diabetes (diet rx), type 2  Morbid Obesity / BMI > 40  Dermatological:  Skin Normal    Psych:   anxiety          Physical Exam  General: Cooperative, Alert and  Oriented    Airway:  Mallampati: I   Mouth Opening: Normal  TM Distance: Normal  Neck ROM: Normal ROM    Dental:  Partial Dentures, Dentures        Anesthesia Plan  Type of Anesthesia, risks & benefits discussed:    Anesthesia Type: Gen ETT  Intra-op Monitoring Plan: Standard ASA Monitors  Post Op Pain Control Plan: multimodal analgesia and IV/PO Opioids PRN  Induction:  IV  Airway Plan: Video  Informed Consent: Informed consent signed with the Patient and all parties understand the risks and agree with anesthesia plan.  All questions answered.   ASA Score: 3  Anesthesia Plan Notes: Labs in epic 5 weeks ago, K 2.8 (states had severe diarrhea prior to draw), increased oral K, will recheck    To take Ativan am of surgery    Day of surgery update: hb 9.9, K 3.6    Ready For Surgery From Anesthesia Perspective.     .

## 2022-08-05 NOTE — TELEPHONE ENCOUNTER
Staff spoke with pt  - Per Dr Barriga, pt advised NOT to take Eliquis on Sunday or Monday prior to surgery.  Pt verbalized understading

## 2022-08-05 NOTE — TELEPHONE ENCOUNTER
Please call patient and be sure she knows NOT to take Eliquis on Sunday or Monday prior to surgery

## 2022-08-08 ENCOUNTER — OFFICE VISIT (OUTPATIENT)
Dept: OBSTETRICS AND GYNECOLOGY | Facility: CLINIC | Age: 57
End: 2022-08-08
Attending: OBSTETRICS & GYNECOLOGY
Payer: COMMERCIAL

## 2022-08-08 ENCOUNTER — PATIENT MESSAGE (OUTPATIENT)
Dept: OBSTETRICS AND GYNECOLOGY | Facility: CLINIC | Age: 57
End: 2022-08-08
Payer: COMMERCIAL

## 2022-08-08 ENCOUNTER — TELEPHONE (OUTPATIENT)
Dept: OBSTETRICS AND GYNECOLOGY | Facility: CLINIC | Age: 57
End: 2022-08-08
Payer: COMMERCIAL

## 2022-08-08 VITALS
HEIGHT: 65 IN | WEIGHT: 274.06 LBS | DIASTOLIC BLOOD PRESSURE: 84 MMHG | SYSTOLIC BLOOD PRESSURE: 152 MMHG | BODY MASS INDEX: 45.66 KG/M2

## 2022-08-08 DIAGNOSIS — N95.0 POSTMENOPAUSAL BLEEDING: Primary | ICD-10-CM

## 2022-08-08 DIAGNOSIS — I48.0 PAF (PAROXYSMAL ATRIAL FIBRILLATION): ICD-10-CM

## 2022-08-08 DIAGNOSIS — E66.01 MORBID OBESITY WITH BMI OF 45.0-49.9, ADULT: ICD-10-CM

## 2022-08-08 PROCEDURE — 3044F PR MOST RECENT HEMOGLOBIN A1C LEVEL <7.0%: ICD-10-PCS | Mod: CPTII,S$GLB,, | Performed by: OBSTETRICS & GYNECOLOGY

## 2022-08-08 PROCEDURE — 3077F PR MOST RECENT SYSTOLIC BLOOD PRESSURE >= 140 MM HG: ICD-10-PCS | Mod: CPTII,S$GLB,, | Performed by: OBSTETRICS & GYNECOLOGY

## 2022-08-08 PROCEDURE — 1160F PR REVIEW ALL MEDS BY PRESCRIBER/CLIN PHARMACIST DOCUMENTED: ICD-10-PCS | Mod: CPTII,S$GLB,, | Performed by: OBSTETRICS & GYNECOLOGY

## 2022-08-08 PROCEDURE — 3044F HG A1C LEVEL LT 7.0%: CPT | Mod: CPTII,S$GLB,, | Performed by: OBSTETRICS & GYNECOLOGY

## 2022-08-08 PROCEDURE — 1159F PR MEDICATION LIST DOCUMENTED IN MEDICAL RECORD: ICD-10-PCS | Mod: CPTII,S$GLB,, | Performed by: OBSTETRICS & GYNECOLOGY

## 2022-08-08 PROCEDURE — 3077F SYST BP >= 140 MM HG: CPT | Mod: CPTII,S$GLB,, | Performed by: OBSTETRICS & GYNECOLOGY

## 2022-08-08 PROCEDURE — 99999 PR PBB SHADOW E&M-EST. PATIENT-LVL IV: CPT | Mod: PBBFAC,,, | Performed by: OBSTETRICS & GYNECOLOGY

## 2022-08-08 PROCEDURE — 4010F PR ACE/ARB THEARPY RXD/TAKEN: ICD-10-PCS | Mod: CPTII,S$GLB,, | Performed by: OBSTETRICS & GYNECOLOGY

## 2022-08-08 PROCEDURE — 3008F BODY MASS INDEX DOCD: CPT | Mod: CPTII,S$GLB,, | Performed by: OBSTETRICS & GYNECOLOGY

## 2022-08-08 PROCEDURE — 99999 PR PBB SHADOW E&M-EST. PATIENT-LVL IV: ICD-10-PCS | Mod: PBBFAC,,, | Performed by: OBSTETRICS & GYNECOLOGY

## 2022-08-08 PROCEDURE — 1159F MED LIST DOCD IN RCRD: CPT | Mod: CPTII,S$GLB,, | Performed by: OBSTETRICS & GYNECOLOGY

## 2022-08-08 PROCEDURE — 1160F RVW MEDS BY RX/DR IN RCRD: CPT | Mod: CPTII,S$GLB,, | Performed by: OBSTETRICS & GYNECOLOGY

## 2022-08-08 PROCEDURE — 3008F PR BODY MASS INDEX (BMI) DOCUMENTED: ICD-10-PCS | Mod: CPTII,S$GLB,, | Performed by: OBSTETRICS & GYNECOLOGY

## 2022-08-08 PROCEDURE — 4010F ACE/ARB THERAPY RXD/TAKEN: CPT | Mod: CPTII,S$GLB,, | Performed by: OBSTETRICS & GYNECOLOGY

## 2022-08-08 PROCEDURE — 3079F PR MOST RECENT DIASTOLIC BLOOD PRESSURE 80-89 MM HG: ICD-10-PCS | Mod: CPTII,S$GLB,, | Performed by: OBSTETRICS & GYNECOLOGY

## 2022-08-08 PROCEDURE — 3079F DIAST BP 80-89 MM HG: CPT | Mod: CPTII,S$GLB,, | Performed by: OBSTETRICS & GYNECOLOGY

## 2022-08-08 PROCEDURE — 99499 UNLISTED E&M SERVICE: CPT | Mod: S$GLB,,, | Performed by: OBSTETRICS & GYNECOLOGY

## 2022-08-08 PROCEDURE — 99499 NO LOS: ICD-10-PCS | Mod: S$GLB,,, | Performed by: OBSTETRICS & GYNECOLOGY

## 2022-08-08 RX ORDER — MUPIROCIN 20 MG/G
OINTMENT TOPICAL
Status: CANCELLED | OUTPATIENT
Start: 2022-08-08

## 2022-08-08 RX ORDER — SODIUM CHLORIDE 9 MG/ML
INJECTION, SOLUTION INTRAVENOUS CONTINUOUS
Status: CANCELLED | OUTPATIENT
Start: 2022-08-08

## 2022-08-08 NOTE — PROGRESS NOTES
HISTORY OF PRESENT ILLNESS:    Vicky Alvarado is a 57 y.o. female, No obstetric history on file., Patient's last menstrual period was 03/25/2022 (exact date).,  presents pre-op for RALH/BSO     2022  sudden onset of PMPB.  S/p transfusion & IV estrogen.  2/22 D&C benign polyp.  No bleeding after D&C.  Was placed on progesterone taper & restarted Eloquis a few days after D&C.    Currently on progesterone 4x/d and still bleeding on/off     U/S:  11 cm uterus with 3 cm fibroid.  Ovaries not seen  5/22 pap & hpv negative     Patient with hx Afib on Eloquis.  S/p ablation x 2 (last one 1/22)  Last Echo 6/21  Cleared by cardiology for hysterectomy    Past Medical History:   Diagnosis Date    Anticoagulant long-term use     Arthritis     Atrial fibrillation     cardioversion    Atrial fibrillation with RVR     Avascular necrosis     L hand    CHF (congestive heart failure)     Depression     Encounter for blood transfusion 07/22/2020    Encounter for blood transfusion 03/2022    GERD (gastroesophageal reflux disease)     Hx of psychiatric care     Hypertension     Iron deficiency anemia 5/7/2022    TIBC 444 with saturated iron 8    Obese     Pre-diabetes 5/7/2022    Psychiatric problem     Sleep apnea     wears cpap       Past Surgical History:   Procedure Laterality Date    ABLATION OF ARRHYTHMOGENIC FOCUS FOR ATRIAL FIBRILLATION N/A 7/20/2020    Procedure: Ablation atrial fibrillation;  Surgeon: Gabriel Hawley MD;  Location: [facility] EP LAB;  Service: Cardiology;  Laterality: N/A;  afib, PVI, RFA, REENA, SHADY, anes, MB, 3 Prep    ABLATION OF ARRHYTHMOGENIC FOCUS FOR ATRIAL FIBRILLATION N/A 1/24/2022    Procedure: Ablation atrial fibrillation;  Surgeon: Gabriel Hawley MD;  Location: [facility] EP LAB;  Service: Cardiology;  Laterality: N/A;  afib/afl, PVI (re-do)/CTI, RFA, REENA (cx if SR), SHADY, anes, MB, 3 Prep    APPLICATION OF WOUND VACUUM-ASSISTED CLOSURE DEVICE Left 8/3/2020    Procedure:  APPLICATION, WOUND VAC;  Surgeon: SEMAJ Márquez III, MD;  Location: [facility] OR 2ND FLR;  Service: Peripheral Vascular;  Laterality: Left;    APPLICATION OF WOUND VACUUM-ASSISTED CLOSURE DEVICE Left 8/6/2020    Procedure: APPLICATION, WOUND VAC;  Surgeon: SEMAJ Márquez III, MD;  Location: [facility] OR 2ND FLR;  Service: Peripheral Vascular;  Laterality: Left;    CARPAL TUNNEL RELEASE Right 6/10/2020    Procedure: RELEASE, CARPAL TUNNEL - RIGHT;  Surgeon: Adelaida Hall MD;  Location: [facility] OR;  Service: Orthopedics;  Laterality: Right;  GENERAL AND REGIONAL    CARPAL TUNNEL RELEASE Left 5/5/2021    Procedure: RELEASE, CARPAL TUNNEL, LEFT;  Surgeon: Adelaida Hall MD;  Location: [facility] OR;  Service: Orthopedics;  Laterality: Left;  GENERAL & REGIONAL IN PACU    CLOSURE OF WOUND Left 8/6/2020    Procedure: CLOSURE, WOUND;  Surgeon: SEMAJ Márquez III, MD;  Location: [facility] OR 2ND FLR;  Service: Peripheral Vascular;  Laterality: Left;  Complex    COLONOSCOPY N/A 8/17/2021    Procedure: COLONOSCOPY Suprep;  Surgeon: Loco Deluca MD;  Location: [facility];  Service: Endoscopy;  Laterality: N/A;    ESOPHAGOGASTRODUODENOSCOPY N/A 8/17/2021    Procedure: EGD (ESOPHAGOGASTRODUODENOSCOPY);  Surgeon: Loco Deluca MD;  Location: [facility];  Service: Endoscopy;  Laterality: N/A;  Patient is schedule to have her Covid test on 08/14/2021 at the [facility] @ 9:30am. [place].    EXPLORATION OF FEMORAL ARTERY Left 7/21/2020    Procedure: EXPLORATION, ARTERY, FEMORAL;  Surgeon: SEMAJ Márquez III, MD;  Location: [facility] OR 2ND FLR;  Service: Peripheral Vascular;  Laterality: Left;  1. Urgent Direct repair, L SFA branch laceration    FOOT SURGERY      HYSTEROSCOPY WITH DILATION AND CURETTAGE OF UTERUS N/A 2/19/2022    Procedure: HYSTEROSCOPY, WITH DILATION AND CURETTAGE OF UTERUS;  Surgeon: Shane Palomo MD;  [facility];  Service: OB/GYN;  Laterality: N/A;    INCISION AND DRAINAGE OF  KNEE Left 5/12/2022    Procedure: INCISION AND DRAINAGE, Prepatellar bursectomy.;  Surgeon: Dre Guzmán MD;  [facility];  Service: Orthopedics;  Laterality: Left;    TREATMENT OF CARDIAC ARRHYTHMIA N/A 1/29/2020    Procedure: CARDIOVERSION;  Surgeon: Gabriel Hawley MD;  Location: [facility] EP LAB;  Service: Cardiology;  Laterality: N/A;  af, demi, dccv, anes, mb, 345    TREATMENT OF CARDIAC ARRHYTHMIA  1/24/2022    Procedure: Cardioversion or Defibrillation;  Surgeon: Gabriel Hawley MD;  Location: [facility] EP LAB;  Service: Cardiology;;    WOUND DEBRIDEMENT Left 8/6/2020    Procedure: DEBRIDEMENT, WOUND;  Surgeon: SEMAJ Márquez III, MD;  [facility];  Service: Peripheral Vascular;  Laterality: Left;       MEDICATIONS AND ALLERGIES:      Current Outpatient Medications:     acetaminophen (TYLENOL) 500 MG tablet, Take 2 tablets (1,000 mg) by mouth at onset of pain or headache, may repeat if needed., Disp: , Rfl:     ALPRAZolam (XANAX) 0.5 MG tablet, Take 1 tablet (0.5 mg total) by mouth 2 (two) times daily as needed for Anxiety., Disp: 60 tablet, Rfl: 4    apixaban (ELIQUIS) 5 mg Tab, Take 1 tablet (5 mg total) by mouth 2 (two) times daily., Disp: 90 tablet, Rfl: 5    atorvastatin (LIPITOR) 40 MG tablet, Take 1 tablet (40 mg total) by mouth once daily., Disp: 90 tablet, Rfl: 3    b complex vitamins capsule, Take 1 capsule by mouth once daily., Disp: , Rfl:     colchicine (COLCRYS) 0.6 mg tablet, For gout attack: Take TWO tablets by mouth, then, 2 hours later, take ONE additional tablet. Then take 1 tablet twice daily only if still needed for gout pain., Disp: 20 tablet, Rfl: 1    DULoxetine (CYMBALTA) 60 MG capsule, Take 2 capsules (120 mg total) by mouth once daily., Disp: 180 capsule, Rfl: 3    ferrous sulfate (SLOW RELEASE IRON) 142 mg (45 mg iron) TbSR, Take 1 tablet (142 mg total) by mouth once daily. FOR 7 DAY THEN INCREASE TO TWICE DAILY AS TOLERATED, Disp: , Rfl:      furosemide (LASIX) 40 MG tablet, Take 1 tablet (40 mg total) by mouth 2 (two) times daily. Resume as needed for edema until followup with your PCP., Disp: 60 tablet, Rfl: 11    gluc-shikha-[id number]-trly-reymundo-bor 750 mg-644 mg- 30 mg-1 mg Tab, Take 1 tablet by mouth once daily. , Disp: , Rfl:     krill/om-3/dha/epa/phospho/ast (MEGARED OMEGA-3 KRILL OIL ORAL), Take 1 capsule by mouth once daily., Disp: , Rfl:     lisinopriL (PRINIVIL,ZESTRIL) 40 MG tablet, Take 1 tablet (40 mg total) by mouth once daily., Disp: 90 tablet, Rfl: 3    medroxyPROGESTERone (PROVERA) 10 MG tablet, Take 2 tablets (20 mg total) by mouth 3 (three) times daily., Disp: 180 tablet, Rfl: 0    melatonin 10 mg Tab, Take 1-2 tablets by mouth nightly as needed (Sleep)., Disp: , Rfl:     multivitamin (THERAGRAN) per tablet, Take 1 tablet by mouth once daily., Disp: , Rfl:     NIFEdipine (PROCARDIA-XL) 90 MG (OSM) 24 hr tablet, Take 1 tablet (90 mg total) by mouth once daily. HOLD UNTIL FOLLOW-UP WITH YOUR PCP., Disp: 30 tablet, Rfl: 11    omeprazole (PRILOSEC) 20 MG capsule, TAKE 1 CAPSULE BY MOUTH ONCE DAILY, Disp: 90 capsule, Rfl: 3    potassium chloride SA (K-DUR,KLOR-CON) 20 MEQ tablet, Take 1 tablet (20 mEq total) by mouth once daily. ONLY TAKE WITH LASIX., Disp: 30 tablet, Rfl: 11    predniSONE (DELTASONE) 10 MG tablet, Take 1 tablet by mouth daily, Disp: 10 tablet, Rfl: 0    propafenone (RYTHMOL) 225 MG Tab, Take 1 tablet (225 mg total) by mouth every 8 (eight) hours., Disp: 90 tablet, Rfl: 11    traZODone (DESYREL) 100 MG tablet, Take 1 tablet (100 mg total) by mouth nightly as needed for Insomnia., Disp: 90 tablet, Rfl: 3    metoprolol succinate (TOPROL-XL) 50 MG 24 hr tablet, Take 1 tablet (50 mg total) by mouth 2 (two) times daily., Disp: 180 tablet, Rfl: 3  No current facility-administered medications for this visit.    Facility-Administered Medications Ordered in Other Visits:     sodium chloride 0.9% bolus 1,000 mL, 1,000  mL, Intravenous, Once, Laurie Montgomery NP    Review of patient's allergies indicates:   Allergen Reactions    Flecainide Shortness Of Breath and Swelling    Vancomycin analogues Hives, Itching and Rash       Family History   Problem Relation Age of Onset    Cataracts Mother     Hypertension Mother     Thyroid disease Mother     Diabetes Father     Hypertension Father     Hypertension Sister     Hypertension Brother     Amblyopia Neg Hx     Blindness Neg Hx     Cancer Neg Hx     Glaucoma Neg Hx     Macular degeneration Neg Hx     Retinal detachment Neg Hx     Strabismus Neg Hx     Stroke Neg Hx        Social History     Socioeconomic History    Marital status:    Tobacco Use    Smoking status: Former Smoker     Types: Cigarettes     Quit date: 7/1/2019     Years since quitting: 3.1    Smokeless tobacco: Never Used   Substance and Sexual Activity    Alcohol use: No    Drug use: No     Social Determinants of Health     Financial Resource Strain: Low Risk     Difficulty of Paying Living Expenses: Not very hard   Food Insecurity: No Food Insecurity    Worried About Running Out of Food in the Last Year: Never true    Ran Out of Food in the Last Year: Never true   Transportation Needs: No Transportation Needs    Lack of Transportation (Medical): No    Lack of Transportation (Non-Medical): No   Physical Activity: Inactive    Days of Exercise per Week: 0 days    Minutes of Exercise per Session: 0 min   Stress: Stress Concern Present    Feeling of Stress : To some extent   Social Connections: Moderately Isolated    Frequency of Communication with Friends and Family: More than three times a week    Frequency of Social Gatherings with Friends and Family: More than three times a week    Attends Orthodoxy Services: 1 to 4 times per year    Active Member of Clubs or Organizations: No    Attends Club or Organization Meetings: Never    Marital Status:    Housing Stability: Unknown  "   Unable to Pay for Housing in the Last Year: No    Unstable Housing in the Last Year: No       ROS:  GENERAL: No weight changes. No swelling. No fatigue. No fever.  CARDIOVASCULAR: No chest pain. No shortness of breath. No leg cramps.   NEUROLOGICAL: No headaches. No vision changes.  BREASTS: No pain. No lumps. No discharge.  ABDOMEN: No pain. No nausea. No vomiting. No diarrhea. No constipation.  REPRODUCTIVE: see above  VULVA: No pain. No lesions. No itching.  VAGINA: No relaxation. No itching. No odor. No discharge. No lesions.  URINARY: No incontinence. No nocturia. No frequency. No dysuria.    BP (!) 152/84 (BP Location: Left arm, Patient Position: Sitting, BP Method: Large (Manual))   Ht 5' 5" (1.651 m)   Wt 124.3 kg (274 lb 0.5 oz)   LMP 03/25/2022 (Exact Date)   BMI 45.60 kg/m²     PE:  APPEARANCE: Well nourished, well developed, in no acute distress.  ABDOMEN: Soft. No tenderness or masses. No hepatosplenomegaly. No hernias.  BREASTS, FUNDOSCOPIC, RECTAL DEFERRED  PELVIC (5/22): External female genitalia without lesions.  Female hair distribution. Adequate perineal body, Normal urethral meatus. Vagina moist and well rugated without lesions or discharge.  No significant cystocele or rectocele present. Cervix pink without lesions, discharge or tenderness. Uterus is normal size, regular, mobile and nontender. Adnexa without masses or tenderness.  EXTREMITIES: No edema      DIAGNOSIS & PLAN  1. Postmenopausal bleeding     2. Atrial Fibrillation (paroxysmal)     3. Morbid obesity with BMI of 45.0-49.9, adult           COUNSELING:  Patient counseled for RALH/BSO.    Counseled on operative risks of laparotomy, infection, bleeding, transfusion, organ injury, and anesthesia complications.  Per cardiology recommendations, held Eloquis for 48 hours prior to surgery and will resume post op day 1 if stable.  She desires to proceed.    "

## 2022-08-08 NOTE — H&P (VIEW-ONLY)
HISTORY OF PRESENT ILLNESS:    Vicky Alvarado is a 57 y.o. female, No obstetric history on file., Patient's last menstrual period was 03/25/2022 (exact date).,  presents pre-op for RALH/BSO     2022  sudden onset of PMPB.  S/p transfusion & IV estrogen.  2/22 D&C benign polyp.  No bleeding after D&C.  Was placed on progesterone taper & restarted Eloquis a few days after D&C.    Currently on progesterone 4x/d and still bleeding on/off     U/S:  11 cm uterus with 3 cm fibroid.  Ovaries not seen  5/22 pap & hpv negative     Patient with hx Afib on Eloquis.  S/p ablation x 2 (last one 1/22)  Last Echo 6/21  Cleared by cardiology for hysterectomy    Past Medical History:   Diagnosis Date    Anticoagulant long-term use     Arthritis     Atrial fibrillation     cardioversion    Atrial fibrillation with RVR     Avascular necrosis     L hand    CHF (congestive heart failure)     Depression     Encounter for blood transfusion 07/22/2020    Encounter for blood transfusion 03/2022    GERD (gastroesophageal reflux disease)     Hx of psychiatric care     Hypertension     Iron deficiency anemia 5/7/2022    TIBC 444 with saturated iron 8    Obese     Pre-diabetes 5/7/2022    Psychiatric problem     Sleep apnea     wears cpap       Past Surgical History:   Procedure Laterality Date    ABLATION OF ARRHYTHMOGENIC FOCUS FOR ATRIAL FIBRILLATION N/A 7/20/2020    Procedure: Ablation atrial fibrillation;  Surgeon: Gabriel Hawley MD;  [facility];  Service: Cardiology;  Laterality: N/A;  afib, PVI, RFA, REENA, SHADY, anes, MB, 3 Prep    ABLATION OF ARRHYTHMOGENIC FOCUS FOR ATRIAL FIBRILLATION N/A 1/24/2022    Procedure: Ablation atrial fibrillation;  Surgeon: Gabrile Hawley MD;  Location[facility] EP [facility];  Service: Cardiology;  Laterality: N/A;  afib/afl, PVI (re-do)/CTI, RFA, REENA (cx if SR), SHADY, anes, MB, 3 Prep    APPLICATION OF WOUND VACUUM-ASSISTED CLOSURE DEVICE Left 8/3/2020    Procedure:  APPLICATION, WOUND VAC;  Surgeon: SEMAJ Márquez III, MD;  Location: [facility] OR 2ND FLR;  Service: Peripheral Vascular;  Laterality: Left;    APPLICATION OF WOUND VACUUM-ASSISTED CLOSURE DEVICE Left 8/6/2020    Procedure: APPLICATION, WOUND VAC;  Surgeon: SEMAJ Márquez III, MD;  Location: [facility] OR 2ND FLR;  Service: Peripheral Vascular;  Laterality: Left;    CARPAL TUNNEL RELEASE Right 6/10/2020    Procedure: RELEASE, CARPAL TUNNEL - RIGHT;  Surgeon: Adelaida Hall MD;  Location: [facility] OR;  Service: Orthopedics;  Laterality: Right;  GENERAL AND REGIONAL    CARPAL TUNNEL RELEASE Left 5/5/2021    Procedure: RELEASE, CARPAL TUNNEL, LEFT;  Surgeon: Adelaida Hall MD;  Location: [facility] OR;  Service: Orthopedics;  Laterality: Left;  GENERAL & REGIONAL IN PACU    CLOSURE OF WOUND Left 8/6/2020    Procedure: CLOSURE, WOUND;  Surgeon: SEMAJ Márquez III, MD;  Location: [facility] OR 2ND FLR;  Service: Peripheral Vascular;  Laterality: Left;  Complex    COLONOSCOPY N/A 8/17/2021    Procedure: COLONOSCOPY Suprep;  Surgeon: Loco Deluca MD;  Location: [facility];  Service: Endoscopy;  Laterality: N/A;    ESOPHAGOGASTRODUODENOSCOPY N/A 8/17/2021    Procedure: EGD (ESOPHAGOGASTRODUODENOSCOPY);  Surgeon: Loco Deluca MD;  Location: [facility];  Service: Endoscopy;  Laterality: N/A;  Patient is schedule to have her Covid test on 08/14/2021 at the [facility] @ 9:30am. [place].    EXPLORATION OF FEMORAL ARTERY Left 7/21/2020    Procedure: EXPLORATION, ARTERY, FEMORAL;  Surgeon: SEMAJ Márquez III, MD;  Location: [facility] OR 2ND FLR;  Service: Peripheral Vascular;  Laterality: Left;  1. Urgent Direct repair, L SFA branch laceration    FOOT SURGERY      HYSTEROSCOPY WITH DILATION AND CURETTAGE OF UTERUS N/A 2/19/2022    Procedure: HYSTEROSCOPY, WITH DILATION AND CURETTAGE OF UTERUS;  Surgeon: Shane Palomo MD;  [facility];  Service: OB/GYN;  Laterality: N/A;    INCISION AND DRAINAGE OF  KNEE Left 5/12/2022    Procedure: INCISION AND DRAINAGE, Prepatellar bursectomy.;  Surgeon: Dre Guzmán MD;  [facility];  Service: Orthopedics;  Laterality: Left;    TREATMENT OF CARDIAC ARRHYTHMIA N/A 1/29/2020    Procedure: CARDIOVERSION;  Surgeon: Gabriel Hawley MD;  Location: [facility] EP LAB;  Service: Cardiology;  Laterality: N/A;  af, demi, dccv, anes, mb, 345    TREATMENT OF CARDIAC ARRHYTHMIA  1/24/2022    Procedure: Cardioversion or Defibrillation;  Surgeon: Gabriel Hawley MD;  Location: [facility] EP LAB;  Service: Cardiology;;    WOUND DEBRIDEMENT Left 8/6/2020    Procedure: DEBRIDEMENT, WOUND;  Surgeon: SEMAJ Márquez III, MD;  [facility];  Service: Peripheral Vascular;  Laterality: Left;       MEDICATIONS AND ALLERGIES:      Current Outpatient Medications:     acetaminophen (TYLENOL) 500 MG tablet, Take 2 tablets (1,000 mg) by mouth at onset of pain or headache, may repeat if needed., Disp: , Rfl:     ALPRAZolam (XANAX) 0.5 MG tablet, Take 1 tablet (0.5 mg total) by mouth 2 (two) times daily as needed for Anxiety., Disp: 60 tablet, Rfl: 4    apixaban (ELIQUIS) 5 mg Tab, Take 1 tablet (5 mg total) by mouth 2 (two) times daily., Disp: 90 tablet, Rfl: 5    atorvastatin (LIPITOR) 40 MG tablet, Take 1 tablet (40 mg total) by mouth once daily., Disp: 90 tablet, Rfl: 3    b complex vitamins capsule, Take 1 capsule by mouth once daily., Disp: , Rfl:     colchicine (COLCRYS) 0.6 mg tablet, For gout attack: Take TWO tablets by mouth, then, 2 hours later, take ONE additional tablet. Then take 1 tablet twice daily only if still needed for gout pain., Disp: 20 tablet, Rfl: 1    DULoxetine (CYMBALTA) 60 MG capsule, Take 2 capsules (120 mg total) by mouth once daily., Disp: 180 capsule, Rfl: 3    ferrous sulfate (SLOW RELEASE IRON) 142 mg (45 mg iron) TbSR, Take 1 tablet (142 mg total) by mouth once daily. FOR 7 DAY THEN INCREASE TO TWICE DAILY AS TOLERATED, Disp: , Rfl:      furosemide (LASIX) 40 MG tablet, Take 1 tablet (40 mg total) by mouth 2 (two) times daily. Resume as needed for edema until followup with your PCP., Disp: 60 tablet, Rfl: 11    gluc-shikha-[facility]#[id number]-reymundo-bor 750 mg-644 mg- 30 mg-1 mg Tab, Take 1 tablet by mouth once daily. , Disp: , Rfl:     krill/om-3/dha/epa/phospho/ast (MEGARED OMEGA-3 KRILL OIL ORAL), Take 1 capsule by mouth once daily., Disp: , Rfl:     lisinopriL (PRINIVIL,ZESTRIL) 40 MG tablet, Take 1 tablet (40 mg total) by mouth once daily., Disp: 90 tablet, Rfl: 3    medroxyPROGESTERone (PROVERA) 10 MG tablet, Take 2 tablets (20 mg total) by mouth 3 (three) times daily., Disp: 180 tablet, Rfl: 0    melatonin 10 mg Tab, Take 1-2 tablets by mouth nightly as needed (Sleep)., Disp: , Rfl:     multivitamin (THERAGRAN) per tablet, Take 1 tablet by mouth once daily., Disp: , Rfl:     NIFEdipine (PROCARDIA-XL) 90 MG (OSM) 24 hr tablet, Take 1 tablet (90 mg total) by mouth once daily. HOLD UNTIL FOLLOW-UP WITH YOUR PCP., Disp: 30 tablet, Rfl: 11    omeprazole (PRILOSEC) 20 MG capsule, TAKE 1 CAPSULE BY MOUTH ONCE DAILY, Disp: 90 capsule, Rfl: 3    potassium chloride SA (K-DUR,KLOR-CON) 20 MEQ tablet, Take 1 tablet (20 mEq total) by mouth once daily. ONLY TAKE WITH LASIX., Disp: 30 tablet, Rfl: 11    predniSONE (DELTASONE) 10 MG tablet, Take 1 tablet by mouth daily, Disp: 10 tablet, Rfl: 0    propafenone (RYTHMOL) 225 MG Tab, Take 1 tablet (225 mg total) by mouth every 8 (eight) hours., Disp: 90 tablet, Rfl: 11    traZODone (DESYREL) 100 MG tablet, Take 1 tablet (100 mg total) by mouth nightly as needed for Insomnia., Disp: 90 tablet, Rfl: 3    metoprolol succinate (TOPROL-XL) 50 MG 24 hr tablet, Take 1 tablet (50 mg total) by mouth 2 (two) times daily., Disp: 180 tablet, Rfl: 3  No current facility-administered medications for this visit.    Facility-Administered Medications Ordered in Other Visits:     sodium chloride 0.9% bolus 1,000 mL, 1,000  mL, Intravenous, Once, Laurie Montgomery NP    Review of patient's allergies indicates:   Allergen Reactions    Flecainide Shortness Of Breath and Swelling    Vancomycin analogues Hives, Itching and Rash       Family History   Problem Relation Age of Onset    Cataracts Mother     Hypertension Mother     Thyroid disease Mother     Diabetes Father     Hypertension Father     Hypertension Sister     Hypertension Brother     Amblyopia Neg Hx     Blindness Neg Hx     Cancer Neg Hx     Glaucoma Neg Hx     Macular degeneration Neg Hx     Retinal detachment Neg Hx     Strabismus Neg Hx     Stroke Neg Hx        Social History     Socioeconomic History    Marital status:    Tobacco Use    Smoking status: Former Smoker     Types: Cigarettes     Quit date: 7/1/2019     Years since quitting: 3.1    Smokeless tobacco: Never Used   Substance and Sexual Activity    Alcohol use: No    Drug use: No     Social Determinants of Health     Financial Resource Strain: Low Risk     Difficulty of Paying Living Expenses: Not very hard   Food Insecurity: No Food Insecurity    Worried About Running Out of Food in the Last Year: Never true    Ran Out of Food in the Last Year: Never true   Transportation Needs: No Transportation Needs    Lack of Transportation (Medical): No    Lack of Transportation (Non-Medical): No   Physical Activity: Inactive    Days of Exercise per Week: 0 days    Minutes of Exercise per Session: 0 min   Stress: Stress Concern Present    Feeling of Stress : To some extent   Social Connections: Moderately Isolated    Frequency of Communication with Friends and Family: More than three times a week    Frequency of Social Gatherings with Friends and Family: More than three times a week    Attends Denominational Services: 1 to 4 times per year    Active Member of Clubs or Organizations: No    Attends Club or Organization Meetings: Never    Marital Status:    Housing Stability: Unknown  "   Unable to Pay for Housing in the Last Year: No    Unstable Housing in the Last Year: No       ROS:  GENERAL: No weight changes. No swelling. No fatigue. No fever.  CARDIOVASCULAR: No chest pain. No shortness of breath. No leg cramps.   NEUROLOGICAL: No headaches. No vision changes.  BREASTS: No pain. No lumps. No discharge.  ABDOMEN: No pain. No nausea. No vomiting. No diarrhea. No constipation.  REPRODUCTIVE: see above  VULVA: No pain. No lesions. No itching.  VAGINA: No relaxation. No itching. No odor. No discharge. No lesions.  URINARY: No incontinence. No nocturia. No frequency. No dysuria.    BP (!) 152/84 (BP Location: Left arm, Patient Position: Sitting, BP Method: Large (Manual))   Ht 5' 5" (1.651 m)   Wt 124.3 kg (274 lb 0.5 oz)   LMP 03/25/2022 (Exact Date)   BMI 45.60 kg/m²     PE:  APPEARANCE: Well nourished, well developed, in no acute distress.  ABDOMEN: Soft. No tenderness or masses. No hepatosplenomegaly. No hernias.  BREASTS, FUNDOSCOPIC, RECTAL DEFERRED  PELVIC (5/22): External female genitalia without lesions.  Female hair distribution. Adequate perineal body, Normal urethral meatus. Vagina moist and well rugated without lesions or discharge.  No significant cystocele or rectocele present. Cervix pink without lesions, discharge or tenderness. Uterus is normal size, regular, mobile and nontender. Adnexa without masses or tenderness.  EXTREMITIES: No edema      DIAGNOSIS & PLAN  1. Postmenopausal bleeding     2. Atrial Fibrillation (paroxysmal)     3. Morbid obesity with BMI of 45.0-49.9, adult           COUNSELING:  Patient counseled for RALH/BSO.    Counseled on operative risks of laparotomy, infection, bleeding, transfusion, organ injury, and anesthesia complications.  Per cardiology recommendations, held Eloquis for 48 hours prior to surgery and will resume post op day 1 if stable.  She desires to proceed.    "

## 2022-08-09 ENCOUNTER — ANESTHESIA (OUTPATIENT)
Dept: SURGERY | Facility: OTHER | Age: 57
DRG: 982 | End: 2022-08-09
Payer: COMMERCIAL

## 2022-08-09 ENCOUNTER — HOSPITAL ENCOUNTER (INPATIENT)
Facility: OTHER | Age: 57
LOS: 3 days | Discharge: HOME | DRG: 982 | End: 2022-08-13
Attending: OBSTETRICS & GYNECOLOGY | Admitting: OBSTETRICS & GYNECOLOGY
Payer: COMMERCIAL

## 2022-08-09 DIAGNOSIS — N95.0 POSTMENOPAUSAL BLEEDING: ICD-10-CM

## 2022-08-09 DIAGNOSIS — J38.4 LARYNGEAL EDEMA: ICD-10-CM

## 2022-08-09 DIAGNOSIS — J96.01 ACUTE HYPOXEMIC RESPIRATORY FAILURE: ICD-10-CM

## 2022-08-09 DIAGNOSIS — I48.11 LONGSTANDING PERSISTENT ATRIAL FIBRILLATION: ICD-10-CM

## 2022-08-09 DIAGNOSIS — I10 ESSENTIAL HYPERTENSION: ICD-10-CM

## 2022-08-09 DIAGNOSIS — Z98.890 S/P ROBOT-ASSISTED SURGICAL PROCEDURE: Primary | ICD-10-CM

## 2022-08-09 LAB
ALLENS TEST: ABNORMAL
ALLENS TEST: ABNORMAL
DELSYS: ABNORMAL
DELSYS: ABNORMAL
ERYTHROCYTE [SEDIMENTATION RATE] IN BLOOD BY WESTERGREN METHOD: 12 MM/H
FIO2: 60
HCO3 UR-SCNC: 27 MMOL/L (ref 24–28)
HCO3 UR-SCNC: 27.2 MMOL/L (ref 24–28)
HCT VFR BLD CALC: 27 %PCV (ref 36–54)
HCT VFR BLD CALC: 28 %PCV (ref 36–54)
HGB BLD-MCNC: 10 G/DL
HGB BLD-MCNC: 9 G/DL
MIN VOL: 12.5
MODE: ABNORMAL
PCO2 BLDA: 41.8 MMHG (ref 35–45)
PCO2 BLDA: 47 MMHG (ref 35–45)
PEEP: 5
PH SMN: 7.37 [PH] (ref 7.35–7.45)
PH SMN: 7.42 [PH] (ref 7.35–7.45)
PIP: 2
PO2 BLDA: 67 MMHG (ref 80–100)
PO2 BLDA: 87 MMHG (ref 80–100)
POC BE: 2 MMOL/L
POC BE: 2 MMOL/L
POC IONIZED CALCIUM: 1.11 MMOL/L (ref 1.06–1.42)
POC IONIZED CALCIUM: 1.17 MMOL/L (ref 1.06–1.42)
POC SATURATED O2: 93 % (ref 95–100)
POC SATURATED O2: 96 % (ref 95–100)
POC TCO2: 28 MMOL/L (ref 23–27)
POC TCO2: 29 MMOL/L (ref 23–27)
POCT GLUCOSE: 122 MG/DL (ref 70–110)
POCT GLUCOSE: 136 MG/DL (ref 70–110)
POTASSIUM BLD-SCNC: 3.9 MMOL/L (ref 3.5–5.1)
POTASSIUM BLD-SCNC: 3.9 MMOL/L (ref 3.5–5.1)
SAMPLE: ABNORMAL
SAMPLE: ABNORMAL
SITE: ABNORMAL
SITE: ABNORMAL
SODIUM BLD-SCNC: 137 MMOL/L (ref 136–145)
SODIUM BLD-SCNC: 138 MMOL/L (ref 136–145)
SP02: 95
VT: 340

## 2022-08-09 PROCEDURE — 25000242 PHARM REV CODE 250 ALT 637 W/ HCPCS: Performed by: ANESTHESIOLOGY

## 2022-08-09 PROCEDURE — 63600175 PHARM REV CODE 636 W HCPCS: Performed by: STUDENT IN AN ORGANIZED HEALTH CARE EDUCATION/TRAINING PROGRAM

## 2022-08-09 PROCEDURE — 94640 AIRWAY INHALATION TREATMENT: CPT | Mod: XB

## 2022-08-09 PROCEDURE — 27201423 OPTIME MED/SURG SUP & DEVICES STERILE SUPPLY: Performed by: OBSTETRICS & GYNECOLOGY

## 2022-08-09 PROCEDURE — 36000712 HC OR TIME LEV V 1ST 15 MIN: Performed by: OBSTETRICS & GYNECOLOGY

## 2022-08-09 PROCEDURE — 71000033 HC RECOVERY, INTIAL HOUR: Performed by: OBSTETRICS & GYNECOLOGY

## 2022-08-09 PROCEDURE — 94761 N-INVAS EAR/PLS OXIMETRY MLT: CPT

## 2022-08-09 PROCEDURE — 63600175 PHARM REV CODE 636 W HCPCS: Performed by: OBSTETRICS & GYNECOLOGY

## 2022-08-09 PROCEDURE — 71000039 HC RECOVERY, EACH ADD'L HOUR: Performed by: OBSTETRICS & GYNECOLOGY

## 2022-08-09 PROCEDURE — 36600 WITHDRAWAL OF ARTERIAL BLOOD: CPT

## 2022-08-09 PROCEDURE — 25000003 PHARM REV CODE 250: Performed by: NURSE ANESTHETIST, CERTIFIED REGISTERED

## 2022-08-09 PROCEDURE — 63600175 PHARM REV CODE 636 W HCPCS

## 2022-08-09 PROCEDURE — 37000008 HC ANESTHESIA 1ST 15 MINUTES: Performed by: OBSTETRICS & GYNECOLOGY

## 2022-08-09 PROCEDURE — 25000003 PHARM REV CODE 250: Performed by: OBSTETRICS & GYNECOLOGY

## 2022-08-09 PROCEDURE — 88305 TISSUE EXAM BY PATHOLOGIST: CPT | Performed by: PATHOLOGY

## 2022-08-09 PROCEDURE — 25000003 PHARM REV CODE 250: Performed by: STUDENT IN AN ORGANIZED HEALTH CARE EDUCATION/TRAINING PROGRAM

## 2022-08-09 PROCEDURE — 94002 VENT MGMT INPAT INIT DAY: CPT

## 2022-08-09 PROCEDURE — 88307 TISSUE EXAM BY PATHOLOGIST: CPT | Mod: 26,,, | Performed by: PATHOLOGY

## 2022-08-09 PROCEDURE — 58573 PR LAPAROSCOPY TOT HYSTERECTOMY UTERUS >250 GRAM W TUBE/OVARY: ICD-10-PCS | Mod: ,,, | Performed by: OBSTETRICS & GYNECOLOGY

## 2022-08-09 PROCEDURE — 88305 TISSUE EXAM BY PATHOLOGIST: ICD-10-PCS | Mod: 26,,, | Performed by: PATHOLOGY

## 2022-08-09 PROCEDURE — 88307 TISSUE EXAM BY PATHOLOGIST: CPT | Performed by: PATHOLOGY

## 2022-08-09 PROCEDURE — 82803 BLOOD GASES ANY COMBINATION: CPT

## 2022-08-09 PROCEDURE — 25000003 PHARM REV CODE 250: Performed by: ANESTHESIOLOGY

## 2022-08-09 PROCEDURE — 37000009 HC ANESTHESIA EA ADD 15 MINS: Performed by: OBSTETRICS & GYNECOLOGY

## 2022-08-09 PROCEDURE — 88342 CHG IMMUNOCYTOCHEMISTRY: ICD-10-PCS | Mod: 26,,, | Performed by: PATHOLOGY

## 2022-08-09 PROCEDURE — 63600175 PHARM REV CODE 636 W HCPCS: Performed by: NURSE ANESTHETIST, CERTIFIED REGISTERED

## 2022-08-09 PROCEDURE — 58573 PR LAPAROSCOPY TOT HYSTERECTOMY UTERUS >250 GRAM W TUBE/OVARY: ICD-10-PCS | Mod: AS,,, | Performed by: NURSE PRACTITIONER

## 2022-08-09 PROCEDURE — 25000242 PHARM REV CODE 250 ALT 637 W/ HCPCS

## 2022-08-09 PROCEDURE — 58573 TLH W/T/O UTERUS OVER 250 G: CPT | Mod: AS,,, | Performed by: NURSE PRACTITIONER

## 2022-08-09 PROCEDURE — P9045 ALBUMIN (HUMAN), 5%, 250 ML: HCPCS | Mod: JG | Performed by: STUDENT IN AN ORGANIZED HEALTH CARE EDUCATION/TRAINING PROGRAM

## 2022-08-09 PROCEDURE — 99900035 HC TECH TIME PER 15 MIN (STAT)

## 2022-08-09 PROCEDURE — 88307 PR  SURG PATH,LEVEL V: ICD-10-PCS | Mod: 26,,, | Performed by: PATHOLOGY

## 2022-08-09 PROCEDURE — 36000713 HC OR TIME LEV V EA ADD 15 MIN: Performed by: OBSTETRICS & GYNECOLOGY

## 2022-08-09 PROCEDURE — 88342 IMHCHEM/IMCYTCHM 1ST ANTB: CPT | Mod: 26,,, | Performed by: PATHOLOGY

## 2022-08-09 PROCEDURE — 88342 IMHCHEM/IMCYTCHM 1ST ANTB: CPT | Performed by: PATHOLOGY

## 2022-08-09 PROCEDURE — 63600175 PHARM REV CODE 636 W HCPCS: Performed by: ANESTHESIOLOGY

## 2022-08-09 PROCEDURE — 58573 TLH W/T/O UTERUS OVER 250 G: CPT | Mod: ,,, | Performed by: OBSTETRICS & GYNECOLOGY

## 2022-08-09 PROCEDURE — 88305 TISSUE EXAM BY PATHOLOGIST: CPT | Mod: 26,,, | Performed by: PATHOLOGY

## 2022-08-09 RX ORDER — SENNOSIDES 8.6 MG/1
TABLET ORAL 2 TIMES DAILY PRN
Qty: 30 TABLET | Refills: 3 | Status: SHIPPED | OUTPATIENT
Start: 2022-08-09 | End: 2022-08-13 | Stop reason: SDUPTHER

## 2022-08-09 RX ORDER — ACETAMINOPHEN 500 MG
1000 TABLET ORAL EVERY 6 HOURS
Status: DISCONTINUED | OUTPATIENT
Start: 2022-08-09 | End: 2022-08-13 | Stop reason: HOSPADM

## 2022-08-09 RX ORDER — PROCHLORPERAZINE EDISYLATE 5 MG/ML
5 INJECTION INTRAMUSCULAR; INTRAVENOUS EVERY 30 MIN PRN
Status: DISCONTINUED | OUTPATIENT
Start: 2022-08-09 | End: 2022-08-13 | Stop reason: HOSPADM

## 2022-08-09 RX ORDER — MEPERIDINE HYDROCHLORIDE 25 MG/ML
12.5 INJECTION INTRAMUSCULAR; INTRAVENOUS; SUBCUTANEOUS ONCE AS NEEDED
Status: ACTIVE | OUTPATIENT
Start: 2022-08-09 | End: 2022-08-10

## 2022-08-09 RX ORDER — ONDANSETRON 2 MG/ML
INJECTION INTRAMUSCULAR; INTRAVENOUS
Status: DISCONTINUED | OUTPATIENT
Start: 2022-08-09 | End: 2022-08-09

## 2022-08-09 RX ORDER — ACETAMINOPHEN 500 MG
1000 TABLET ORAL
Status: COMPLETED | OUTPATIENT
Start: 2022-08-09 | End: 2022-08-09

## 2022-08-09 RX ORDER — OXYCODONE HYDROCHLORIDE 5 MG/1
5 TABLET ORAL
Status: DISCONTINUED | OUTPATIENT
Start: 2022-08-09 | End: 2022-08-09 | Stop reason: HOSPADM

## 2022-08-09 RX ORDER — CALCIUM CARBONATE 200(500)MG
500 TABLET,CHEWABLE ORAL 3 TIMES DAILY PRN
Status: DISCONTINUED | OUTPATIENT
Start: 2022-08-09 | End: 2022-08-13 | Stop reason: HOSPADM

## 2022-08-09 RX ORDER — TALC
9 POWDER (GRAM) TOPICAL NIGHTLY PRN
Status: DISCONTINUED | OUTPATIENT
Start: 2022-08-09 | End: 2022-08-13 | Stop reason: HOSPADM

## 2022-08-09 RX ORDER — ONDANSETRON 2 MG/ML
8 INJECTION INTRAMUSCULAR; INTRAVENOUS EVERY 6 HOURS PRN
Status: DISCONTINUED | OUTPATIENT
Start: 2022-08-09 | End: 2022-08-13 | Stop reason: HOSPADM

## 2022-08-09 RX ORDER — TRAZODONE HYDROCHLORIDE 100 MG/1
100 TABLET ORAL NIGHTLY PRN
Status: DISCONTINUED | OUTPATIENT
Start: 2022-08-09 | End: 2022-08-13 | Stop reason: HOSPADM

## 2022-08-09 RX ORDER — HYDROMORPHONE HYDROCHLORIDE 1 MG/ML
0.5 INJECTION, SOLUTION INTRAMUSCULAR; INTRAVENOUS; SUBCUTANEOUS
Status: DISCONTINUED | OUTPATIENT
Start: 2022-08-09 | End: 2022-08-13 | Stop reason: HOSPADM

## 2022-08-09 RX ORDER — HYDROMORPHONE HYDROCHLORIDE 2 MG/ML
0.4 INJECTION, SOLUTION INTRAMUSCULAR; INTRAVENOUS; SUBCUTANEOUS EVERY 5 MIN PRN
Status: DISCONTINUED | OUTPATIENT
Start: 2022-08-09 | End: 2022-08-09

## 2022-08-09 RX ORDER — SODIUM CHLORIDE 0.9 % (FLUSH) 0.9 %
3 SYRINGE (ML) INJECTION
Status: DISCONTINUED | OUTPATIENT
Start: 2022-08-09 | End: 2022-08-13 | Stop reason: HOSPADM

## 2022-08-09 RX ORDER — CEFAZOLIN SODIUM 2 G/50ML
2 SOLUTION INTRAVENOUS
Status: COMPLETED | OUTPATIENT
Start: 2022-08-09 | End: 2022-08-09

## 2022-08-09 RX ORDER — ACETAMINOPHEN 500 MG
1000 TABLET ORAL EVERY 6 HOURS PRN
Status: DISCONTINUED | OUTPATIENT
Start: 2022-08-09 | End: 2022-08-13 | Stop reason: HOSPADM

## 2022-08-09 RX ORDER — KETAMINE HCL IN 0.9 % NACL 50 MG/5 ML
SYRINGE (ML) INTRAVENOUS
Status: DISCONTINUED | OUTPATIENT
Start: 2022-08-09 | End: 2022-08-09

## 2022-08-09 RX ORDER — KETOROLAC TROMETHAMINE 30 MG/ML
15 INJECTION, SOLUTION INTRAMUSCULAR; INTRAVENOUS EVERY 6 HOURS
Status: DISCONTINUED | OUTPATIENT
Start: 2022-08-09 | End: 2022-08-10

## 2022-08-09 RX ORDER — FENTANYL CITRATE 50 UG/ML
INJECTION, SOLUTION INTRAMUSCULAR; INTRAVENOUS
Status: COMPLETED
Start: 2022-08-09 | End: 2022-08-09

## 2022-08-09 RX ORDER — FENTANYL CITRATE 50 UG/ML
INJECTION, SOLUTION INTRAMUSCULAR; INTRAVENOUS
Status: DISCONTINUED | OUTPATIENT
Start: 2022-08-09 | End: 2022-08-09

## 2022-08-09 RX ORDER — HYDROMORPHONE HYDROCHLORIDE 1 MG/ML
0.2 INJECTION, SOLUTION INTRAMUSCULAR; INTRAVENOUS; SUBCUTANEOUS
Status: DISCONTINUED | OUTPATIENT
Start: 2022-08-09 | End: 2022-08-09

## 2022-08-09 RX ORDER — SODIUM CHLORIDE, SODIUM LACTATE, POTASSIUM CHLORIDE, CALCIUM CHLORIDE 600; 310; 30; 20 MG/100ML; MG/100ML; MG/100ML; MG/100ML
INJECTION, SOLUTION INTRAVENOUS CONTINUOUS
Status: DISCONTINUED | OUTPATIENT
Start: 2022-08-09 | End: 2022-08-09

## 2022-08-09 RX ORDER — ROCURONIUM BROMIDE 10 MG/ML
INJECTION, SOLUTION INTRAVENOUS
Status: DISCONTINUED | OUTPATIENT
Start: 2022-08-09 | End: 2022-08-09

## 2022-08-09 RX ORDER — DEXAMETHASONE SODIUM PHOSPHATE 4 MG/ML
INJECTION, SOLUTION INTRA-ARTICULAR; INTRALESIONAL; INTRAMUSCULAR; INTRAVENOUS; SOFT TISSUE
Status: DISCONTINUED | OUTPATIENT
Start: 2022-08-09 | End: 2022-08-09

## 2022-08-09 RX ORDER — HYDRALAZINE HYDROCHLORIDE 20 MG/ML
10 INJECTION INTRAMUSCULAR; INTRAVENOUS EVERY 6 HOURS PRN
Status: DISCONTINUED | OUTPATIENT
Start: 2022-08-09 | End: 2022-08-13 | Stop reason: HOSPADM

## 2022-08-09 RX ORDER — PROCHLORPERAZINE EDISYLATE 5 MG/ML
5 INJECTION INTRAMUSCULAR; INTRAVENOUS EVERY 6 HOURS PRN
Status: DISCONTINUED | OUTPATIENT
Start: 2022-08-09 | End: 2022-08-13 | Stop reason: HOSPADM

## 2022-08-09 RX ORDER — ATORVASTATIN CALCIUM 20 MG/1
40 TABLET, FILM COATED ORAL DAILY
Status: DISCONTINUED | OUTPATIENT
Start: 2022-08-10 | End: 2022-08-13 | Stop reason: HOSPADM

## 2022-08-09 RX ORDER — PROPOFOL 10 MG/ML
VIAL (ML) INTRAVENOUS
Status: DISCONTINUED | OUTPATIENT
Start: 2022-08-09 | End: 2022-08-09

## 2022-08-09 RX ORDER — FENTANYL CITRATE 50 UG/ML
25 INJECTION, SOLUTION INTRAMUSCULAR; INTRAVENOUS
Status: DISCONTINUED | OUTPATIENT
Start: 2022-08-09 | End: 2022-08-12

## 2022-08-09 RX ORDER — HYDROMORPHONE HYDROCHLORIDE 1 MG/ML
0.4 INJECTION, SOLUTION INTRAMUSCULAR; INTRAVENOUS; SUBCUTANEOUS
Status: DISCONTINUED | OUTPATIENT
Start: 2022-08-09 | End: 2022-08-09

## 2022-08-09 RX ORDER — DULOXETIN HYDROCHLORIDE 30 MG/1
120 CAPSULE, DELAYED RELEASE ORAL DAILY
Status: DISCONTINUED | OUTPATIENT
Start: 2022-08-10 | End: 2022-08-13 | Stop reason: HOSPADM

## 2022-08-09 RX ORDER — PREGABALIN 50 MG/1
50 CAPSULE ORAL ONCE
Status: COMPLETED | OUTPATIENT
Start: 2022-08-09 | End: 2022-08-09

## 2022-08-09 RX ORDER — METOPROLOL SUCCINATE 50 MG/1
50 TABLET, EXTENDED RELEASE ORAL 2 TIMES DAILY
Status: DISCONTINUED | OUTPATIENT
Start: 2022-08-09 | End: 2022-08-13 | Stop reason: HOSPADM

## 2022-08-09 RX ORDER — OXYCODONE HYDROCHLORIDE 5 MG/1
5 TABLET ORAL EVERY 4 HOURS PRN
Status: DISCONTINUED | OUTPATIENT
Start: 2022-08-09 | End: 2022-08-13 | Stop reason: HOSPADM

## 2022-08-09 RX ORDER — KETOROLAC TROMETHAMINE 30 MG/ML
15 INJECTION, SOLUTION INTRAMUSCULAR; INTRAVENOUS EVERY 6 HOURS PRN
Status: ACTIVE | OUTPATIENT
Start: 2022-08-09 | End: 2022-08-12

## 2022-08-09 RX ORDER — IBUPROFEN 600 MG/1
600 TABLET ORAL EVERY 6 HOURS PRN
Qty: 30 TABLET | Refills: 1 | Status: SHIPPED | OUTPATIENT
Start: 2022-08-09 | End: 2022-08-13 | Stop reason: SDUPTHER

## 2022-08-09 RX ORDER — DIPHENHYDRAMINE HYDROCHLORIDE 50 MG/ML
12.5 INJECTION INTRAMUSCULAR; INTRAVENOUS EVERY 30 MIN PRN
Status: DISCONTINUED | OUTPATIENT
Start: 2022-08-09 | End: 2022-08-09 | Stop reason: HOSPADM

## 2022-08-09 RX ORDER — SODIUM CHLORIDE, SODIUM LACTATE, POTASSIUM CHLORIDE, CALCIUM CHLORIDE 600; 310; 30; 20 MG/100ML; MG/100ML; MG/100ML; MG/100ML
INJECTION, SOLUTION INTRAVENOUS CONTINUOUS
Status: DISCONTINUED | OUTPATIENT
Start: 2022-08-09 | End: 2022-08-10

## 2022-08-09 RX ORDER — PROPOFOL 10 MG/ML
0-50 INJECTION, EMULSION INTRAVENOUS CONTINUOUS
Status: DISCONTINUED | OUTPATIENT
Start: 2022-08-09 | End: 2022-08-10

## 2022-08-09 RX ORDER — OXYCODONE HYDROCHLORIDE 5 MG/1
5 TABLET ORAL
Status: DISCONTINUED | OUTPATIENT
Start: 2022-08-09 | End: 2022-08-09

## 2022-08-09 RX ORDER — FENTANYL CITRATE 50 UG/ML
25 INJECTION, SOLUTION INTRAMUSCULAR; INTRAVENOUS ONCE
Status: DISCONTINUED | OUTPATIENT
Start: 2022-08-09 | End: 2022-08-09

## 2022-08-09 RX ORDER — SODIUM CHLORIDE 9 MG/ML
INJECTION, SOLUTION INTRAVENOUS CONTINUOUS
Status: DISCONTINUED | OUTPATIENT
Start: 2022-08-09 | End: 2022-08-09

## 2022-08-09 RX ORDER — MIDAZOLAM HYDROCHLORIDE 1 MG/ML
INJECTION INTRAMUSCULAR; INTRAVENOUS
Status: DISCONTINUED | OUTPATIENT
Start: 2022-08-09 | End: 2022-08-09

## 2022-08-09 RX ORDER — FENTANYL CITRATE 50 UG/ML
50 INJECTION, SOLUTION INTRAMUSCULAR; INTRAVENOUS ONCE
Status: COMPLETED | OUTPATIENT
Start: 2022-08-09 | End: 2022-08-09

## 2022-08-09 RX ORDER — POLYETHYLENE GLYCOL 3350 17 G/17G
17 POWDER, FOR SOLUTION ORAL 2 TIMES DAILY PRN
Status: DISCONTINUED | OUTPATIENT
Start: 2022-08-09 | End: 2022-08-13 | Stop reason: HOSPADM

## 2022-08-09 RX ORDER — IPRATROPIUM BROMIDE AND ALBUTEROL SULFATE 2.5; .5 MG/3ML; MG/3ML
3 SOLUTION RESPIRATORY (INHALATION) EVERY 4 HOURS PRN
Status: DISCONTINUED | OUTPATIENT
Start: 2022-08-09 | End: 2022-08-13 | Stop reason: HOSPADM

## 2022-08-09 RX ORDER — LIDOCAINE HYDROCHLORIDE 10 MG/ML
0.5 INJECTION, SOLUTION EPIDURAL; INFILTRATION; INTRACAUDAL; PERINEURAL ONCE
Status: DISCONTINUED | OUTPATIENT
Start: 2022-08-09 | End: 2022-08-09 | Stop reason: HOSPADM

## 2022-08-09 RX ORDER — OXYCODONE HYDROCHLORIDE 5 MG/1
5 TABLET ORAL EVERY 4 HOURS PRN
Status: DISCONTINUED | OUTPATIENT
Start: 2022-08-09 | End: 2022-08-09

## 2022-08-09 RX ORDER — ENOXAPARIN SODIUM 100 MG/ML
40 INJECTION SUBCUTANEOUS EVERY 12 HOURS
Status: DISCONTINUED | OUTPATIENT
Start: 2022-08-10 | End: 2022-08-09

## 2022-08-09 RX ORDER — ONDANSETRON 2 MG/ML
4 INJECTION INTRAMUSCULAR; INTRAVENOUS EVERY 4 HOURS PRN
Status: DISCONTINUED | OUTPATIENT
Start: 2022-08-09 | End: 2022-08-13 | Stop reason: HOSPADM

## 2022-08-09 RX ORDER — MIDAZOLAM HYDROCHLORIDE 1 MG/ML
2 INJECTION INTRAMUSCULAR; INTRAVENOUS ONCE
Status: COMPLETED | OUTPATIENT
Start: 2022-08-09 | End: 2022-08-09

## 2022-08-09 RX ORDER — IBUPROFEN 600 MG/1
600 TABLET ORAL EVERY 6 HOURS
Status: DISCONTINUED | OUTPATIENT
Start: 2022-08-10 | End: 2022-08-10

## 2022-08-09 RX ORDER — ADHESIVE BANDAGE
30 BANDAGE TOPICAL DAILY PRN
Status: DISCONTINUED | OUTPATIENT
Start: 2022-08-09 | End: 2022-08-13 | Stop reason: HOSPADM

## 2022-08-09 RX ORDER — PANTOPRAZOLE SODIUM 40 MG/1
40 TABLET, DELAYED RELEASE ORAL DAILY
Status: DISCONTINUED | OUTPATIENT
Start: 2022-08-10 | End: 2022-08-13 | Stop reason: HOSPADM

## 2022-08-09 RX ORDER — SIMETHICONE 80 MG
1 TABLET,CHEWABLE ORAL 3 TIMES DAILY PRN
Status: DISCONTINUED | OUTPATIENT
Start: 2022-08-09 | End: 2022-08-13 | Stop reason: HOSPADM

## 2022-08-09 RX ORDER — PROCHLORPERAZINE EDISYLATE 5 MG/ML
5 INJECTION INTRAMUSCULAR; INTRAVENOUS EVERY 30 MIN PRN
Status: DISCONTINUED | OUTPATIENT
Start: 2022-08-09 | End: 2022-08-09 | Stop reason: HOSPADM

## 2022-08-09 RX ORDER — ATROPA BELLADONNA AND OPIUM 16.2; 6 MG/1; MG/1
60 SUPPOSITORY RECTAL EVERY 6 HOURS PRN
Status: DISCONTINUED | OUTPATIENT
Start: 2022-08-09 | End: 2022-08-13 | Stop reason: HOSPADM

## 2022-08-09 RX ORDER — NALOXONE HCL 0.4 MG/ML
0.02 VIAL (ML) INJECTION
Status: DISCONTINUED | OUTPATIENT
Start: 2022-08-09 | End: 2022-08-13 | Stop reason: HOSPADM

## 2022-08-09 RX ORDER — ALBUMIN HUMAN 50 G/1000ML
SOLUTION INTRAVENOUS CONTINUOUS PRN
Status: DISCONTINUED | OUTPATIENT
Start: 2022-08-09 | End: 2022-08-09

## 2022-08-09 RX ORDER — AMOXICILLIN 250 MG
1 CAPSULE ORAL 2 TIMES DAILY
Status: DISCONTINUED | OUTPATIENT
Start: 2022-08-09 | End: 2022-08-13 | Stop reason: HOSPADM

## 2022-08-09 RX ORDER — OXYCODONE AND ACETAMINOPHEN 5; 325 MG/1; MG/1
1 TABLET ORAL EVERY 4 HOURS PRN
Qty: 10 TABLET | Refills: 0 | Status: SHIPPED | OUTPATIENT
Start: 2022-08-09 | End: 2022-08-13 | Stop reason: SDUPTHER

## 2022-08-09 RX ORDER — LIDOCAINE HYDROCHLORIDE 20 MG/ML
INJECTION, SOLUTION EPIDURAL; INFILTRATION; INTRACAUDAL; PERINEURAL
Status: DISCONTINUED | OUTPATIENT
Start: 2022-08-09 | End: 2022-08-09

## 2022-08-09 RX ORDER — OXYCODONE HYDROCHLORIDE 5 MG/1
10 TABLET ORAL EVERY 4 HOURS PRN
Status: DISCONTINUED | OUTPATIENT
Start: 2022-08-09 | End: 2022-08-13 | Stop reason: HOSPADM

## 2022-08-09 RX ORDER — HYDROMORPHONE HYDROCHLORIDE 2 MG/ML
0.4 INJECTION, SOLUTION INTRAMUSCULAR; INTRAVENOUS; SUBCUTANEOUS EVERY 5 MIN PRN
Status: DISCONTINUED | OUTPATIENT
Start: 2022-08-09 | End: 2022-08-09 | Stop reason: HOSPADM

## 2022-08-09 RX ORDER — PHENYLEPHRINE HYDROCHLORIDE 10 MG/ML
INJECTION INTRAVENOUS
Status: DISCONTINUED | OUTPATIENT
Start: 2022-08-09 | End: 2022-08-09

## 2022-08-09 RX ORDER — MIDAZOLAM HYDROCHLORIDE 1 MG/ML
0.5 INJECTION INTRAMUSCULAR; INTRAVENOUS
Status: DISCONTINUED | OUTPATIENT
Start: 2022-08-09 | End: 2022-08-12

## 2022-08-09 RX ORDER — ALPRAZOLAM 0.5 MG/1
0.5 TABLET ORAL 2 TIMES DAILY PRN
Status: DISCONTINUED | OUTPATIENT
Start: 2022-08-09 | End: 2022-08-13 | Stop reason: HOSPADM

## 2022-08-09 RX ORDER — MUPIROCIN 20 MG/G
OINTMENT TOPICAL
Status: DISCONTINUED | OUTPATIENT
Start: 2022-08-09 | End: 2022-08-09 | Stop reason: HOSPADM

## 2022-08-09 RX ADMIN — SODIUM CHLORIDE, SODIUM LACTATE, POTASSIUM CHLORIDE, AND CALCIUM CHLORIDE: 600; 310; 30; 20 INJECTION, SOLUTION INTRAVENOUS at 09:08

## 2022-08-09 RX ADMIN — PHENYLEPHRINE HYDROCHLORIDE 100 MCG: 10 INJECTION INTRAVENOUS at 02:08

## 2022-08-09 RX ADMIN — CEFAZOLIN SODIUM 3 G: 2 SOLUTION INTRAVENOUS at 02:08

## 2022-08-09 RX ADMIN — GLYCOPYRROLATE 0.2 MG: 0.2 INJECTION, SOLUTION INTRAMUSCULAR; INTRAVITREAL at 10:08

## 2022-08-09 RX ADMIN — ROCURONIUM BROMIDE 50 MG: 10 SOLUTION INTRAVENOUS at 10:08

## 2022-08-09 RX ADMIN — ROCURONIUM BROMIDE 20 MG: 10 SOLUTION INTRAVENOUS at 11:08

## 2022-08-09 RX ADMIN — LIDOCAINE HYDROCHLORIDE 80 MG: 20 INJECTION, SOLUTION EPIDURAL; INFILTRATION; INTRACAUDAL; PERINEURAL at 10:08

## 2022-08-09 RX ADMIN — ROCURONIUM BROMIDE 20 MG: 10 SOLUTION INTRAVENOUS at 12:08

## 2022-08-09 RX ADMIN — DEXAMETHASONE SODIUM PHOSPHATE 4 MG: 4 INJECTION, SOLUTION INTRAMUSCULAR; INTRAVENOUS at 10:08

## 2022-08-09 RX ADMIN — DEXAMETHASONE SODIUM PHOSPHATE 8 MG: 4 INJECTION, SOLUTION INTRAMUSCULAR; INTRAVENOUS at 12:08

## 2022-08-09 RX ADMIN — FENTANYL CITRATE 50 MCG: 50 INJECTION, SOLUTION INTRAMUSCULAR; INTRAVENOUS at 03:08

## 2022-08-09 RX ADMIN — ALBUMIN (HUMAN): 12.5 SOLUTION INTRAVENOUS at 12:08

## 2022-08-09 RX ADMIN — ROCURONIUM BROMIDE 20 MG: 10 SOLUTION INTRAVENOUS at 01:08

## 2022-08-09 RX ADMIN — PHENYLEPHRINE HYDROCHLORIDE 200 MCG: 10 INJECTION INTRAVENOUS at 11:08

## 2022-08-09 RX ADMIN — PROPOFOL 50 MCG/KG/MIN: 10 INJECTION, EMULSION INTRAVENOUS at 09:08

## 2022-08-09 RX ADMIN — FENTANYL CITRATE 100 MCG: 50 INJECTION, SOLUTION INTRAMUSCULAR; INTRAVENOUS at 10:08

## 2022-08-09 RX ADMIN — ONDANSETRON HYDROCHLORIDE 4 MG: 2 INJECTION INTRAMUSCULAR; INTRAVENOUS at 02:08

## 2022-08-09 RX ADMIN — HYDROMORPHONE HYDROCHLORIDE 0.5 MG: 1 INJECTION, SOLUTION INTRAMUSCULAR; INTRAVENOUS; SUBCUTANEOUS at 05:08

## 2022-08-09 RX ADMIN — MIDAZOLAM HYDROCHLORIDE 2 MG: 1 INJECTION, SOLUTION INTRAMUSCULAR; INTRAVENOUS at 03:08

## 2022-08-09 RX ADMIN — MIDAZOLAM HYDROCHLORIDE 2 MG: 1 INJECTION, SOLUTION INTRAMUSCULAR; INTRAVENOUS at 04:08

## 2022-08-09 RX ADMIN — PROPAFENONE HYDROCHLORIDE 225 MG: 150 TABLET, FILM COATED ORAL at 09:08

## 2022-08-09 RX ADMIN — PHENYLEPHRINE HYDROCHLORIDE 100 MCG: 10 INJECTION INTRAVENOUS at 11:08

## 2022-08-09 RX ADMIN — ACETAMINOPHEN 1000 MG: 500 TABLET, FILM COATED ORAL at 07:08

## 2022-08-09 RX ADMIN — RACEPINEPHRINE HYDROCHLORIDE 0.5 ML: 11.25 SOLUTION RESPIRATORY (INHALATION) at 03:08

## 2022-08-09 RX ADMIN — CARBOXYMETHYLCELLULOSE SODIUM 2 DROP: 2.5 SOLUTION/ DROPS OPHTHALMIC at 10:08

## 2022-08-09 RX ADMIN — Medication 20 MG: at 12:08

## 2022-08-09 RX ADMIN — CEFAZOLIN SODIUM 3 G: 2 SOLUTION INTRAVENOUS at 11:08

## 2022-08-09 RX ADMIN — SODIUM CHLORIDE, SODIUM LACTATE, POTASSIUM CHLORIDE, AND CALCIUM CHLORIDE: 600; 310; 30; 20 INJECTION, SOLUTION INTRAVENOUS at 12:08

## 2022-08-09 RX ADMIN — FENTANYL CITRATE 25 MCG: 50 INJECTION, SOLUTION INTRAMUSCULAR; INTRAVENOUS at 08:08

## 2022-08-09 RX ADMIN — PROPOFOL 5 MCG/KG/MIN: 10 INJECTION, EMULSION INTRAVENOUS at 05:08

## 2022-08-09 RX ADMIN — METOPROLOL SUCCINATE 50 MG: 50 TABLET, EXTENDED RELEASE ORAL at 08:08

## 2022-08-09 RX ADMIN — MIDAZOLAM HYDROCHLORIDE 2 MG: 1 INJECTION, SOLUTION INTRAMUSCULAR; INTRAVENOUS at 10:08

## 2022-08-09 RX ADMIN — Medication 50 MG: at 11:08

## 2022-08-09 RX ADMIN — HYDROMORPHONE HYDROCHLORIDE 0.2 MG: 1 INJECTION, SOLUTION INTRAMUSCULAR; INTRAVENOUS; SUBCUTANEOUS at 05:08

## 2022-08-09 RX ADMIN — ROCURONIUM BROMIDE 10 MG: 10 SOLUTION INTRAVENOUS at 02:08

## 2022-08-09 RX ADMIN — FENTANYL CITRATE 50 MCG: 50 INJECTION, SOLUTION INTRAMUSCULAR; INTRAVENOUS at 04:08

## 2022-08-09 RX ADMIN — PHENYLEPHRINE HYDROCHLORIDE 100 MCG: 10 INJECTION INTRAVENOUS at 01:08

## 2022-08-09 RX ADMIN — SUGAMMADEX 400 MG: 100 INJECTION, SOLUTION INTRAVENOUS at 02:08

## 2022-08-09 RX ADMIN — MUPIROCIN: 20 OINTMENT TOPICAL at 07:08

## 2022-08-09 RX ADMIN — PROPOFOL 50 MCG/KG/MIN: 10 INJECTION, EMULSION INTRAVENOUS at 08:08

## 2022-08-09 RX ADMIN — ALBUMIN (HUMAN): 12.5 SOLUTION INTRAVENOUS at 10:08

## 2022-08-09 RX ADMIN — ONDANSETRON 8 MG: 2 INJECTION INTRAMUSCULAR; INTRAVENOUS at 06:08

## 2022-08-09 RX ADMIN — PROPOFOL 200 MG: 10 INJECTION, EMULSION INTRAVENOUS at 10:08

## 2022-08-09 RX ADMIN — PREGABALIN 50 MG: 50 CAPSULE ORAL at 07:08

## 2022-08-09 RX ADMIN — ROCURONIUM BROMIDE 30 MG: 10 SOLUTION INTRAVENOUS at 12:08

## 2022-08-09 RX ADMIN — SODIUM CHLORIDE, SODIUM LACTATE, POTASSIUM CHLORIDE, AND CALCIUM CHLORIDE: .6; .31; .03; .02 INJECTION, SOLUTION INTRAVENOUS at 05:08

## 2022-08-09 RX ADMIN — SENNOSIDES AND DOCUSATE SODIUM 1 TABLET: 50; 8.6 TABLET ORAL at 08:08

## 2022-08-09 RX ADMIN — OXYCODONE 5 MG: 5 TABLET ORAL at 08:08

## 2022-08-09 RX ADMIN — ALPRAZOLAM 0.5 MG: 0.5 TABLET ORAL at 08:08

## 2022-08-09 NOTE — PROGRESS NOTES
Post sx , pt had no leak with cuff deflated . Pt sedated and taken to pacu for wake up vent. Steroids given and also blow by racemic epi to decrease edema. Pt stable at this time. With no cuff leak continued , pt taken to icu sedated with vent overnite and attempt extubation in am. Dr galeano aware.

## 2022-08-09 NOTE — OPERATIVE NOTE ADDENDUM
Certification of Assistant at Surgery       Surgery Date: 8/9/2022     Participating Surgeons:  Surgeon(s) and Role:     * Yolanda Barriga MD - Primary     * Tino Mera Jr., MD - Consult    Procedures:  Procedure(s) (LRB):  XI ROBOTIC HYSTERECTOMY (N/A)  CYSTOSCOPY (N/A)    Assistant Surgeon's Certification of Necessity:  I understand that section 1842 (b) (6) (d) of the Social Security Act generally prohibits Medicare Part B reasonable charge payment for the services of assistants at surgery in teaching hospitals when qualified residents are available to furnish such services. I certify that the services for which payment is claimed were medically necessary, and that no qualified resident was available to perform the services. I further understand that these services are subject to post-payment review by the Medicare carrier.      JOSE Syed    08/09/2022  3:09 PM

## 2022-08-09 NOTE — INTERVAL H&P NOTE
The patient has been examined and the H&P has been reviewed:    I concur with the findings and no changes have occurred since H&P was written.    Anesthesia/Surgery risks, benefits and alternative options discussed and understood by patient/family.      To OR for RA-TLH/BSO w/ Dr. Barriga. Consents reviewed and in chart.     Jodee Nascimento MD   PGY-3, OB-GYN        There are no hospital problems to display for this patient.

## 2022-08-09 NOTE — CARE UPDATE
Per anesthesia, patient unable to be extubated due to suspected airway edema, will require ICU care overnight. Transfer orders and Pulm-crit care consult ordered. Dr. Barriga aware, will notify patient's family member.     Per Dr. Coyle, will plan to extubate tomorrow AM.    Jodee Nascimento MD   PGY-3, OB-GYN

## 2022-08-09 NOTE — ANESTHESIA POSTPROCEDURE EVALUATION
Anesthesia Post Evaluation    Patient: Vicky Alvarado    Procedure(s) Performed: Procedure(s) (LRB):  XI ROBOTIC HYSTERECTOMY (N/A)  CYSTOSCOPY (N/A)  ROBOTIC SALPINGO-OOPHORECTOMY (Bilateral)    Final Anesthesia Type: general      Patient location during evaluation: PACU  Patient participation: Yes- Able to Participate  Level of consciousness: awake and alert  Post-procedure vital signs: reviewed and stable  Pain management: adequate  Airway patency: patent  ERENDIRA mitigation strategies: Extubation while patient is awake  PONV status at discharge: No PONV  Anesthetic complications: no      Cardiovascular status: hemodynamically stable  Respiratory status: unassisted  Hydration status: euvolemic  Follow-up not needed.          Vitals Value Taken Time   /65 08/09/22 1626   Temp 36.4 °C (97.6 °F) 08/09/22 1522   Pulse 67 08/09/22 1628   Resp 12 08/09/22 1615   SpO2 97 % 08/09/22 1628   Vitals shown include unvalidated device data.      Event Time   Out of Recovery 16:37:00         Pain/Tolu Score: Pain Rating Prior to Med Admin: 0 (8/9/2022  7:56 AM)

## 2022-08-09 NOTE — TRANSFER OF CARE
"Anesthesia Transfer of Care Note    Patient: Vicky Alvarado    Procedure(s) Performed: Procedure(s) (LRB):  XI ROBOTIC HYSTERECTOMY (N/A)  CYSTOSCOPY (N/A)  ROBOTIC SALPINGO-OOPHORECTOMY (Bilateral)    Patient location: PACU    Anesthesia Type: general    Transport from OR: Upon arrival to PACU/ICU, patient attached to ventilator and auscultated to confirm bilateral breath sounds and adequate TV    Post pain: adequate analgesia    Post assessment: no apparent anesthetic complications    Post vital signs: stable    Level of consciousness: sedated    Nausea/Vomiting: no nausea/vomiting    Complications: none    Transfer of care protocol was followedComments: Pt left intubated until swelling of face subsides.      Last vitals:   Visit Vitals  /66 (BP Location: Left arm, Patient Position: Sitting)   Pulse 60   Temp 36.4 °C (97.6 °F) (Temporal)   Resp 12   Ht 5' 5" (1.651 m)   Wt 135.2 kg (298 lb 1 oz)   LMP 03/25/2022 (Exact Date)   SpO2 100%   Breastfeeding No   BMI 49.60 kg/m²     "

## 2022-08-10 LAB
ALBUMIN SERPL BCP-MCNC: 3.7 G/DL (ref 3.5–5.2)
ALLENS TEST: ABNORMAL
ALLENS TEST: ABNORMAL
ALP SERPL-CCNC: 77 U/L (ref 55–135)
ALT SERPL W/O P-5'-P-CCNC: 13 U/L (ref 10–44)
ANION GAP SERPL CALC-SCNC: 13 MMOL/L (ref 8–16)
ANION GAP SERPL CALC-SCNC: 16 MMOL/L (ref 8–16)
ANISOCYTOSIS BLD QL SMEAR: SLIGHT
AST SERPL-CCNC: 63 U/L (ref 10–40)
BASOPHILS # BLD AUTO: 0.01 K/UL (ref 0–0.2)
BASOPHILS # BLD AUTO: 0.01 K/UL (ref 0–0.2)
BASOPHILS NFR BLD: 0.2 % (ref 0–1.9)
BASOPHILS NFR BLD: 0.2 % (ref 0–1.9)
BILIRUB SERPL-MCNC: 0.7 MG/DL (ref 0.1–1)
BUN SERPL-MCNC: 10 MG/DL (ref 6–20)
BUN SERPL-MCNC: 12 MG/DL (ref 6–20)
CALCIUM SERPL-MCNC: 8.2 MG/DL (ref 8.7–10.5)
CALCIUM SERPL-MCNC: 8.4 MG/DL (ref 8.7–10.5)
CHLORIDE SERPL-SCNC: 101 MMOL/L (ref 95–110)
CHLORIDE SERPL-SCNC: 104 MMOL/L (ref 95–110)
CO2 SERPL-SCNC: 17 MMOL/L (ref 23–29)
CO2 SERPL-SCNC: 24 MMOL/L (ref 23–29)
CREAT SERPL-MCNC: 1.1 MG/DL (ref 0.5–1.4)
CREAT SERPL-MCNC: 1.1 MG/DL (ref 0.5–1.4)
DELSYS: ABNORMAL
DELSYS: ABNORMAL
DIFFERENTIAL METHOD: ABNORMAL
DIFFERENTIAL METHOD: ABNORMAL
EOSINOPHIL # BLD AUTO: 0 K/UL (ref 0–0.5)
EOSINOPHIL # BLD AUTO: 0 K/UL (ref 0–0.5)
EOSINOPHIL NFR BLD: 0 % (ref 0–8)
EOSINOPHIL NFR BLD: 0 % (ref 0–8)
ERYTHROCYTE [DISTWIDTH] IN BLOOD BY AUTOMATED COUNT: 18 % (ref 11.5–14.5)
ERYTHROCYTE [DISTWIDTH] IN BLOOD BY AUTOMATED COUNT: 18.2 % (ref 11.5–14.5)
ERYTHROCYTE [SEDIMENTATION RATE] IN BLOOD BY WESTERGREN METHOD: 14 MM/H
ERYTHROCYTE [SEDIMENTATION RATE] IN BLOOD BY WESTERGREN METHOD: 14 MM/H
EST. GFR  (NO RACE VARIABLE): 58 ML/MIN/1.73 M^2
EST. GFR  (NO RACE VARIABLE): 58 ML/MIN/1.73 M^2
FIO2: 50
FIO2: 50
GLUCOSE SERPL-MCNC: 125 MG/DL (ref 70–110)
GLUCOSE SERPL-MCNC: 129 MG/DL (ref 70–110)
HCO3 UR-SCNC: 27.4 MMOL/L (ref 24–28)
HCO3 UR-SCNC: 29.1 MMOL/L (ref 24–28)
HCT VFR BLD AUTO: 26.6 % (ref 37–48.5)
HCT VFR BLD AUTO: 27.8 % (ref 37–48.5)
HCT VFR BLD CALC: 27 %PCV (ref 36–54)
HCT VFR BLD CALC: 29 %PCV (ref 36–54)
HGB BLD-MCNC: 10 G/DL
HGB BLD-MCNC: 7.9 G/DL (ref 12–16)
HGB BLD-MCNC: 8 G/DL (ref 12–16)
HGB BLD-MCNC: 9 G/DL
HYPOCHROMIA BLD QL SMEAR: ABNORMAL
IMM GRANULOCYTES # BLD AUTO: 0.02 K/UL (ref 0–0.04)
IMM GRANULOCYTES # BLD AUTO: 0.02 K/UL (ref 0–0.04)
IMM GRANULOCYTES NFR BLD AUTO: 0.3 % (ref 0–0.5)
IMM GRANULOCYTES NFR BLD AUTO: 0.4 % (ref 0–0.5)
LYMPHOCYTES # BLD AUTO: 0.3 K/UL (ref 1–4.8)
LYMPHOCYTES # BLD AUTO: 0.5 K/UL (ref 1–4.8)
LYMPHOCYTES NFR BLD: 5.7 % (ref 18–48)
LYMPHOCYTES NFR BLD: 8.3 % (ref 18–48)
MAGNESIUM SERPL-MCNC: 1.6 MG/DL (ref 1.6–2.6)
MCH RBC QN AUTO: 24.8 PG (ref 27–31)
MCH RBC QN AUTO: 24.8 PG (ref 27–31)
MCHC RBC AUTO-ENTMCNC: 28.8 G/DL (ref 32–36)
MCHC RBC AUTO-ENTMCNC: 29.7 G/DL (ref 32–36)
MCV RBC AUTO: 84 FL (ref 82–98)
MCV RBC AUTO: 86 FL (ref 82–98)
MIN VOL: 6.13
MIN VOL: 6.34
MODE: ABNORMAL
MODE: ABNORMAL
MONOCYTES # BLD AUTO: 0.2 K/UL (ref 0.3–1)
MONOCYTES # BLD AUTO: 0.3 K/UL (ref 0.3–1)
MONOCYTES NFR BLD: 2.8 % (ref 4–15)
MONOCYTES NFR BLD: 5.4 % (ref 4–15)
NEUTROPHILS # BLD AUTO: 4.9 K/UL (ref 1.8–7.7)
NEUTROPHILS # BLD AUTO: 5.5 K/UL (ref 1.8–7.7)
NEUTROPHILS NFR BLD: 85.7 % (ref 38–73)
NEUTROPHILS NFR BLD: 91 % (ref 38–73)
NRBC BLD-RTO: 0 /100 WBC
NRBC BLD-RTO: 0 /100 WBC
OVALOCYTES BLD QL SMEAR: ABNORMAL
PCO2 BLDA: 44.7 MMHG (ref 35–45)
PCO2 BLDA: 58.2 MMHG (ref 35–45)
PEEP: 5
PEEP: 5
PH SMN: 7.31 [PH] (ref 7.35–7.45)
PH SMN: 7.4 [PH] (ref 7.35–7.45)
PIP: 20
PIP: 32
PLATELET # BLD AUTO: 146 K/UL (ref 150–450)
PLATELET # BLD AUTO: 153 K/UL (ref 150–450)
PLATELET BLD QL SMEAR: ABNORMAL
PMV BLD AUTO: 10.4 FL (ref 9.2–12.9)
PMV BLD AUTO: 11.1 FL (ref 9.2–12.9)
PO2 BLDA: 64 MMHG (ref 80–100)
PO2 BLDA: 89 MMHG (ref 80–100)
POC BE: 3 MMOL/L
POC BE: 3 MMOL/L
POC IONIZED CALCIUM: 1.15 MMOL/L (ref 1.06–1.42)
POC IONIZED CALCIUM: 1.16 MMOL/L (ref 1.06–1.42)
POC SATURATED O2: 92 % (ref 95–100)
POC SATURATED O2: 96 % (ref 95–100)
POC TCO2: 29 MMOL/L (ref 23–27)
POC TCO2: 31 MMOL/L (ref 23–27)
POCT GLUCOSE: 115 MG/DL (ref 70–110)
POIKILOCYTOSIS BLD QL SMEAR: SLIGHT
POTASSIUM BLD-SCNC: 3.7 MMOL/L (ref 3.5–5.1)
POTASSIUM BLD-SCNC: 4.3 MMOL/L (ref 3.5–5.1)
POTASSIUM SERPL-SCNC: 4.3 MMOL/L (ref 3.5–5.1)
POTASSIUM SERPL-SCNC: 5.1 MMOL/L (ref 3.5–5.1)
PROT SERPL-MCNC: 6.9 G/DL (ref 6–8.4)
RBC # BLD AUTO: 3.18 M/UL (ref 4–5.4)
RBC # BLD AUTO: 3.23 M/UL (ref 4–5.4)
SAMPLE: ABNORMAL
SAMPLE: ABNORMAL
SITE: ABNORMAL
SITE: ABNORMAL
SODIUM BLD-SCNC: 135 MMOL/L (ref 136–145)
SODIUM BLD-SCNC: 136 MMOL/L (ref 136–145)
SODIUM SERPL-SCNC: 137 MMOL/L (ref 136–145)
SODIUM SERPL-SCNC: 138 MMOL/L (ref 136–145)
SP02: 92
SP02: 95
VT: 400
VT: 400
WBC # BLD AUTO: 5.69 K/UL (ref 3.9–12.7)
WBC # BLD AUTO: 5.99 K/UL (ref 3.9–12.7)

## 2022-08-10 PROCEDURE — 25000003 PHARM REV CODE 250: Performed by: STUDENT IN AN ORGANIZED HEALTH CARE EDUCATION/TRAINING PROGRAM

## 2022-08-10 PROCEDURE — 63600175 PHARM REV CODE 636 W HCPCS: Performed by: STUDENT IN AN ORGANIZED HEALTH CARE EDUCATION/TRAINING PROGRAM

## 2022-08-10 PROCEDURE — 99900026 HC AIRWAY MAINTENANCE (STAT)

## 2022-08-10 PROCEDURE — 85025 COMPLETE CBC W/AUTO DIFF WBC: CPT | Performed by: OBSTETRICS & GYNECOLOGY

## 2022-08-10 PROCEDURE — 63600175 PHARM REV CODE 636 W HCPCS: Performed by: INTERNAL MEDICINE

## 2022-08-10 PROCEDURE — 85025 COMPLETE CBC W/AUTO DIFF WBC: CPT | Mod: 91 | Performed by: STUDENT IN AN ORGANIZED HEALTH CARE EDUCATION/TRAINING PROGRAM

## 2022-08-10 PROCEDURE — 36415 COLL VENOUS BLD VENIPUNCTURE: CPT | Performed by: INTERNAL MEDICINE

## 2022-08-10 PROCEDURE — 94761 N-INVAS EAR/PLS OXIMETRY MLT: CPT

## 2022-08-10 PROCEDURE — 36600 WITHDRAWAL OF ARTERIAL BLOOD: CPT

## 2022-08-10 PROCEDURE — 82803 BLOOD GASES ANY COMBINATION: CPT

## 2022-08-10 PROCEDURE — 20000000 HC ICU ROOM

## 2022-08-10 PROCEDURE — 94003 VENT MGMT INPAT SUBQ DAY: CPT

## 2022-08-10 PROCEDURE — 99900035 HC TECH TIME PER 15 MIN (STAT)

## 2022-08-10 PROCEDURE — 36415 COLL VENOUS BLD VENIPUNCTURE: CPT | Performed by: STUDENT IN AN ORGANIZED HEALTH CARE EDUCATION/TRAINING PROGRAM

## 2022-08-10 PROCEDURE — 80053 COMPREHEN METABOLIC PANEL: CPT | Performed by: INTERNAL MEDICINE

## 2022-08-10 PROCEDURE — 27000221 HC OXYGEN, UP TO 24 HOURS

## 2022-08-10 PROCEDURE — 83735 ASSAY OF MAGNESIUM: CPT | Performed by: INTERNAL MEDICINE

## 2022-08-10 PROCEDURE — 80048 BASIC METABOLIC PNL TOTAL CA: CPT | Mod: XB | Performed by: STUDENT IN AN ORGANIZED HEALTH CARE EDUCATION/TRAINING PROGRAM

## 2022-08-10 RX ORDER — FENTANYL CITRATE 50 UG/ML
50 INJECTION, SOLUTION INTRAMUSCULAR; INTRAVENOUS ONCE
Status: COMPLETED | OUTPATIENT
Start: 2022-08-10 | End: 2022-08-10

## 2022-08-10 RX ORDER — FENTANYL CITRATE-0.9 % NACL/PF 10 MCG/ML
0-200 PLASTIC BAG, INJECTION (ML) INTRAVENOUS CONTINUOUS
Status: DISCONTINUED | OUTPATIENT
Start: 2022-08-10 | End: 2022-08-12

## 2022-08-10 RX ORDER — FENTANYL CITRATE 50 UG/ML
50 INJECTION, SOLUTION INTRAMUSCULAR; INTRAVENOUS ONCE
Status: DISCONTINUED | OUTPATIENT
Start: 2022-08-10 | End: 2022-08-10

## 2022-08-10 RX ORDER — DEXAMETHASONE SODIUM PHOSPHATE 4 MG/ML
4 INJECTION, SOLUTION INTRA-ARTICULAR; INTRALESIONAL; INTRAMUSCULAR; INTRAVENOUS; SOFT TISSUE ONCE
Status: DISCONTINUED | OUTPATIENT
Start: 2022-08-10 | End: 2022-08-10

## 2022-08-10 RX ORDER — DEXMEDETOMIDINE HYDROCHLORIDE 4 UG/ML
0-1.4 INJECTION, SOLUTION INTRAVENOUS CONTINUOUS
Status: DISCONTINUED | OUTPATIENT
Start: 2022-08-10 | End: 2022-08-10

## 2022-08-10 RX ORDER — HYDROCODONE BITARTRATE AND ACETAMINOPHEN 500; 5 MG/1; MG/1
TABLET ORAL
Status: DISCONTINUED | OUTPATIENT
Start: 2022-08-10 | End: 2022-08-13 | Stop reason: HOSPADM

## 2022-08-10 RX ORDER — FUROSEMIDE 10 MG/ML
40 INJECTION INTRAMUSCULAR; INTRAVENOUS ONCE
Status: COMPLETED | OUTPATIENT
Start: 2022-08-10 | End: 2022-08-10

## 2022-08-10 RX ORDER — DEXMEDETOMIDINE HYDROCHLORIDE 4 UG/ML
0-1.4 INJECTION, SOLUTION INTRAVENOUS CONTINUOUS
Status: DISCONTINUED | OUTPATIENT
Start: 2022-08-10 | End: 2022-08-12

## 2022-08-10 RX ORDER — PROPOFOL 10 MG/ML
0-50 INJECTION, EMULSION INTRAVENOUS CONTINUOUS
Status: DISCONTINUED | OUTPATIENT
Start: 2022-08-10 | End: 2022-08-12

## 2022-08-10 RX ADMIN — PROPAFENONE HYDROCHLORIDE 225 MG: 150 TABLET, FILM COATED ORAL at 06:08

## 2022-08-10 RX ADMIN — METOPROLOL SUCCINATE 50 MG: 50 TABLET, EXTENDED RELEASE ORAL at 08:08

## 2022-08-10 RX ADMIN — DEXTROSE 0.5 MG/HR: 50 INJECTION, SOLUTION INTRAVENOUS at 12:08

## 2022-08-10 RX ADMIN — PROPOFOL 50 MCG/KG/MIN: 10 INJECTION, EMULSION INTRAVENOUS at 12:08

## 2022-08-10 RX ADMIN — PROPOFOL 50 MCG/KG/MIN: 10 INJECTION, EMULSION INTRAVENOUS at 09:08

## 2022-08-10 RX ADMIN — ACETAMINOPHEN 1000 MG: 500 TABLET, FILM COATED ORAL at 05:08

## 2022-08-10 RX ADMIN — DULOXETINE 120 MG: 30 CAPSULE, DELAYED RELEASE ORAL at 08:08

## 2022-08-10 RX ADMIN — ALPRAZOLAM 0.5 MG: 0.5 TABLET ORAL at 10:08

## 2022-08-10 RX ADMIN — ACETAMINOPHEN 1000 MG: 500 TABLET, FILM COATED ORAL at 06:08

## 2022-08-10 RX ADMIN — PROPOFOL 50 MCG/KG/MIN: 10 INJECTION, EMULSION INTRAVENOUS at 06:08

## 2022-08-10 RX ADMIN — MIDAZOLAM 0.5 MG: 1 INJECTION INTRAMUSCULAR; INTRAVENOUS at 03:08

## 2022-08-10 RX ADMIN — FENTANYL CITRATE 50 MCG: 50 INJECTION, SOLUTION INTRAMUSCULAR; INTRAVENOUS at 11:08

## 2022-08-10 RX ADMIN — PROPAFENONE HYDROCHLORIDE 225 MG: 150 TABLET, FILM COATED ORAL at 10:08

## 2022-08-10 RX ADMIN — KETOROLAC TROMETHAMINE 15 MG: 30 INJECTION, SOLUTION INTRAMUSCULAR at 12:08

## 2022-08-10 RX ADMIN — ALPRAZOLAM 0.5 MG: 0.5 TABLET ORAL at 08:08

## 2022-08-10 RX ADMIN — SODIUM CHLORIDE, SODIUM LACTATE, POTASSIUM CHLORIDE, AND CALCIUM CHLORIDE: .6; .31; .03; .02 INJECTION, SOLUTION INTRAVENOUS at 01:08

## 2022-08-10 RX ADMIN — METHYLPREDNISOLONE SODIUM SUCCINATE 80 MG: 40 INJECTION, POWDER, FOR SOLUTION INTRAMUSCULAR; INTRAVENOUS at 08:08

## 2022-08-10 RX ADMIN — FENTANYL CITRATE 25 MCG: 50 INJECTION, SOLUTION INTRAMUSCULAR; INTRAVENOUS at 06:08

## 2022-08-10 RX ADMIN — HYDROMORPHONE HYDROCHLORIDE 0.5 MG: 1 INJECTION, SOLUTION INTRAMUSCULAR; INTRAVENOUS; SUBCUTANEOUS at 11:08

## 2022-08-10 RX ADMIN — FUROSEMIDE 40 MG: 10 INJECTION, SOLUTION INTRAMUSCULAR; INTRAVENOUS at 08:08

## 2022-08-10 RX ADMIN — ATORVASTATIN CALCIUM 40 MG: 20 TABLET, FILM COATED ORAL at 08:08

## 2022-08-10 RX ADMIN — PROPOFOL 50 MCG/KG/MIN: 10 INJECTION, EMULSION INTRAVENOUS at 11:08

## 2022-08-10 RX ADMIN — Medication 25 MCG/HR: at 08:08

## 2022-08-10 RX ADMIN — PROPOFOL 50 MCG/KG/MIN: 10 INJECTION, EMULSION INTRAVENOUS at 03:08

## 2022-08-10 RX ADMIN — HYDROMORPHONE HYDROCHLORIDE 0.5 MG: 1 INJECTION, SOLUTION INTRAMUSCULAR; INTRAVENOUS; SUBCUTANEOUS at 07:08

## 2022-08-10 RX ADMIN — APIXABAN 5 MG: 2.5 TABLET, FILM COATED ORAL at 09:08

## 2022-08-10 RX ADMIN — ACETAMINOPHEN 1000 MG: 500 TABLET, FILM COATED ORAL at 11:08

## 2022-08-10 RX ADMIN — FENTANYL CITRATE 25 MCG: 50 INJECTION, SOLUTION INTRAMUSCULAR; INTRAVENOUS at 12:08

## 2022-08-10 RX ADMIN — PROPOFOL 50 MCG/KG/MIN: 10 INJECTION, EMULSION INTRAVENOUS at 08:08

## 2022-08-10 RX ADMIN — ACETAMINOPHEN 1000 MG: 500 TABLET, FILM COATED ORAL at 12:08

## 2022-08-10 RX ADMIN — PROPOFOL 40 MCG/KG/MIN: 10 INJECTION, EMULSION INTRAVENOUS at 06:08

## 2022-08-10 RX ADMIN — HYDROMORPHONE HYDROCHLORIDE 0.5 MG: 1 INJECTION, SOLUTION INTRAMUSCULAR; INTRAVENOUS; SUBCUTANEOUS at 03:08

## 2022-08-10 RX ADMIN — FENTANYL CITRATE 25 MCG: 50 INJECTION, SOLUTION INTRAMUSCULAR; INTRAVENOUS at 10:08

## 2022-08-10 RX ADMIN — KETOROLAC TROMETHAMINE 15 MG: 30 INJECTION, SOLUTION INTRAMUSCULAR at 06:08

## 2022-08-10 RX ADMIN — Medication 50 MCG/HR: at 11:08

## 2022-08-10 RX ADMIN — PROPOFOL 20 MCG/KG/MIN: 10 INJECTION, EMULSION INTRAVENOUS at 10:08

## 2022-08-10 RX ADMIN — PANTOPRAZOLE SODIUM 40 MG: 40 TABLET, DELAYED RELEASE ORAL at 08:08

## 2022-08-10 RX ADMIN — PROPAFENONE HYDROCHLORIDE 225 MG: 150 TABLET, FILM COATED ORAL at 01:08

## 2022-08-10 RX ADMIN — SENNOSIDES AND DOCUSATE SODIUM 1 TABLET: 50; 8.6 TABLET ORAL at 09:08

## 2022-08-10 RX ADMIN — DEXMEDETOMIDINE HYDROCHLORIDE 1.4 MCG/KG/HR: 4 INJECTION, SOLUTION INTRAVENOUS at 07:08

## 2022-08-10 RX ADMIN — PROPOFOL 45 MCG/KG/MIN: 10 INJECTION, EMULSION INTRAVENOUS at 02:08

## 2022-08-10 RX ADMIN — SENNOSIDES AND DOCUSATE SODIUM 1 TABLET: 50; 8.6 TABLET ORAL at 08:08

## 2022-08-10 NOTE — PROGRESS NOTES
[facility] Intensive Care [facility]  Gynecology   Progress Note    Patient Name: Vicky Alvarado  MRN: [id number]  Admission Date: 8/9/2022  Primary Care Provider: Gordy Cordero MD  Principal Problem: S/P robot-assisted surgical procedure    Subjective:     Interval History: POD#1 s/p RA-TLH/BSO/Cystoscopy.   Patient unable to be extubated postop due to airway edema and decision made to transfer to the ICU for higher level of care to remained intubated and sedated with plans to attempt extubation this morning.     Overall, patient did well overnight. History to follow was obtained from patient's nurse and chart review as patient only able to wake to voice, but remains sedated. Per nurse, patient did well overnight and required several doses of fentanyl for pain control, as well as versed for anxiety. Nurse reports no vaginal bleeding noted overnight. Patient has not had BM and has been voiding via branch catheter.       Scheduled Meds:   acetaminophen  1,000 mg Oral Q6H    atorvastatin  40 mg Oral Daily    DULoxetine  120 mg Oral Daily    ketorolac  15 mg Intravenous Q6H    Followed by    ibuprofen  600 mg Oral Q6H    metoprolol succinate  50 mg Oral BID    pantoprazole  40 mg Oral Daily    propafenone  225 mg Oral Q8H    senna-docusate 8.6-50 mg  1 tablet Oral BID     Continuous Infusions:   lactated ringers 40 mL/hr at 08/10/22 0600    propofoL 50 mcg/kg/min (08/10/22 0624)     PRN Meds:acetaminophen, albuterol-ipratropium, ALPRAZolam, calcium carbonate, fentaNYL, hydrALAZINE, HYDROmorphone, ketorolac, magnesium hydroxide 400 mg/5 ml, melatonin, meperidine, midazolam, naloxone, ondansetron, ondansetron, opium-belladonna 16.2-60 mg, oxyCODONE, oxyCODONE, polyethylene glycol, prochlorperazine, prochlorperazine, simethicone, sodium chloride 0.9%, sodium chloride 0.9%, traZODone    Review of patient's allergies indicates:   Allergen Reactions    Flecainide Shortness Of Breath and Swelling     Vancomycin analogues Hives, Itching and Rash       Objective:     Vital Signs (Most Recent):  Temp: 98.8 °F (37.1 °C) (08/10/22 0000)  Pulse: (!) 58 (08/10/22 0600)  Resp: (!) 27 (08/10/22 0625)  BP: 132/67 (08/10/22 0600)  SpO2: 99 % (08/10/22 0600) Vital Signs (24h Range):  Temp:  [97.6 °F (36.4 °C)-98.8 °F (37.1 °C)] 98.8 °F (37.1 °C)  Pulse:  [57-79] 58  Resp:  [10-32] 27  SpO2:  [92 %-100 %] 99 %  BP: ()/(50-78) 132/67     Weight: 112.9 kg (248 lb 14.4 oz)  Body mass index is 41.42 kg/m².  Patient's last menstrual period was 03/25/2022 (exact date).    I&O (Last 24H):    Intake/Output Summary (Last 24 hours) at 8/10/2022 0648  Last data filed at 8/10/2022 0600  Gross per 24 hour   Intake 4516.53 ml   Output 860 ml   Net 3656.53 ml       Physical Exam:   Constitutional: Patient appears comfortable in bed, intubated and sedated. Facial edema resolved.   Cardiovascular: Normal rate, regular rhythm, normal heart sounds   Pulmonary/Chest: Intubated with normal chest rise and normal lung sounds b/l. She has no wheezes.      Abdominal: Incisions clean, dry, and intact, with steri-strips and bandaids in place. Soft. Non-distended. Obese habitus.   Genitourinary: No blood on chux pad, branch in place draining dark yellow, clear urine   Neurological: Awakens to voice, not following commands.   Psychiatric: Unable to assess due to sedation.    Laboratory:  CBC:   Recent Labs   Lab 08/10/22  0400   WBC 5.69   RBC 3.18*   HGB 7.9*   HCT 26.6*   *   MCV 84   MCH 24.8*   MCHC 29.7*     CMP:   Recent Labs   Lab 08/10/22  0337   *   CALCIUM 8.4*   ALBUMIN 3.7   PROT 6.9      K 5.1   CO2 17*      BUN 10   CREATININE 1.1   ALKPHOS 77   ALT 13   AST 63*   BILITOT 0.7       Assessment/Plan:     Active Diagnoses:    Diagnosis Date Noted POA    PRINCIPAL PROBLEM:  s/p RA-TLLUIS/BSO [Z98.890] 08/09/2022 Not Applicable      Problems Resolved During this Admission:       Postoperative Airway Edema   -  Immediately postop, anesthesia attempted extubation, however, they were unable to extubate due to suspected airway edema. Attempted extubation for a second time in PACU after administration of steroids/nebs, however, unable to safely extubate  - Overnight, patient remained intubated and sedated on Propofol   - Vent settings managed per Pulm/Crit, appreciate assistance with this complicated case   - Per anesthesia, will attempt extubation this morning     Routine Post-Op Care:  POD#1, remains intubated/seation in ICU as noted in problem above   - Continue IVF this AM  - Gurrola remains in place with marginal UOP (0.29cc/kg/hr)  - AM CBC significant for H/H 8/26, Plt 146    - will administer one unit PRBC   - SCDs for DVT prophylaxis, plan to restart home Eliquis this PM   - Continue scheduled colace  - Pain regimen: toradol d/c this AM given rise in Cr to 1.1, continue scheduled tylenol and fentanyl/dilaudid PRN per ICU orders while pt intubated. Transition to PO meds when patient extubated     Atrial Fibrillation with RVR   - Home metoprolol and propafenone continued   - Telemetry while inpatient, normal rhythm overnight   - Home eliquis to be restarted this evening     CHF  - Home metoprolol continued  - remains on Tele     Depression/Anxiety   - Home duloxetine continued   - midazolam PRN for anxiety per ICU   - trazodone PRN for insomnia when no longer sedated     GERD  - Scheduled protonix     HTN  - Home lisinopril, lasix, procardia held   - Hydralazine PRN for SBP >180   -     HLD   - home statin continued     MO   - BMI 45   - SCDs   - Restart eliquis this PM for VTE ppx     ERENDIRA   - Currently intubated, plan for home CPAP when extubated     Abnormal Liver Appearance   - Abnormally appearing liver noted on abdominal entry during surgery, general surgery called for evaluation   - Suspect micronodular cirrhosis   - Will notify pt when alert  - AST/ALT mildly elevated this AM 63/13  - Refer to GI/Hepatology  outpatient    Disposition: Plan for extubation this AM per anesthesia. When extubated will assess stability for transfer to floor and adjust orders to encourage advancement of postop milestones.    Jodee Nascimento MD  Gynecology   [facility] Int[facility] ([place])

## 2022-08-10 NOTE — EICU
Rounding (Video Assessment):  yes    Intervention Initiated From:  COR / EICU    Marbin Communicated with Bedside Nurse regarding:  Medication and pain    Nurse Notified:  Yes    Doctor Notified:  Yes    Comments: Pt uncomfortable post surgery.  BS RN gave PRN meds.  Asked MD if would consider starting Fentanyl for pain and help pt get comfortable.

## 2022-08-10 NOTE — PLAN OF CARE
Patient's family is aware of the patient's POC while under M's care while at this facility. Her POC includes ventilation care and how to keep the airway patent. The POC explanation also included lab work and nursing care. The patient will also remain free of infections during her hospital stay

## 2022-08-10 NOTE — CARE UPDATE
Pt recieved intubated on    ventilator with the following settings;. AC/VC  / RR 14/ Peep+5/ FI02 50%.  RT performed cuff leak test; Pt failed test.  After ABG RT increased FI02 to 60%.  Alarms are set and functioning, Ambu bag at bedside, Pt without distress RT will continue to monitor and wean as tolerated.

## 2022-08-10 NOTE — CONSULTS
[facility] Pulmonary/Critical Care Consult Note      Patient:Vicky Alvarado  Age:57 y.o.  MRN:[id number]  Admit date:8/9/2022  LOS:0 day(s)     Primary Team: OBGYN  Primary Attending: Nithya    Reason for Consult: vent management     HPI:       Vicky Alvarado is a 57 year old woman with PMH AF on eliquis, depression, ERENDIRA on home CPAP, obesity, HTN, GERD, PMPB. She was admitted to [facility] 8/8/22 for hysterectomy for her persistent PMBP. Underwent RA-TLH/BSO on 8/9/22 without surgical complications. Per anesthesia, patient was unable to be extubated post-op due to suspected airway edema. She was given racemic epi nebs and decadron. Transferred to ICU from PACU, PCCM consulted for vent management. Vent orders placed with propofol for sedation on ICU arrival.     MEDICAL HISTORY:      Past Medical History:  Past Medical History:   Diagnosis Date    Anticoagulant long-term use     Arthritis     Atrial fibrillation     cardioversion    Atrial fibrillation with RVR     Avascular necrosis     L hand    CHF (congestive heart failure)     Depression     Encounter for blood transfusion 07/22/2020    Encounter for blood transfusion 03/2022    GERD (gastroesophageal reflux disease)     Hx of psychiatric care     Hypertension     Iron deficiency anemia 5/7/2022    TIBC 444 with saturated iron 8    Obese     Pre-diabetes 5/7/2022    Psychiatric problem     Sleep apnea     wears cpap      Past Surgical History:  Past Surgical History:   Procedure Laterality Date    ABLATION OF ARRHYTHMOGENIC FOCUS FOR ATRIAL FIBRILLATION N/A 7/20/2020    Procedure: Ablation atrial fibrillation;  Surgeon: Gabriel Hawley MD;  Location: [facility] EP LAB;  Service: Cardiology;  Laterality: N/A;  afib, PVI, RFA, REENA, SHADY, anes, MB, 3 Prep    ABLATION OF ARRHYTHMOGENIC FOCUS FOR ATRIAL FIBRILLATION N/A 1/24/2022    Procedure: Ablation atrial fibrillation;  Surgeon: Gabriel Hawley MD;  Location: [facility] EP LAB;  Service:  Cardiology;  Laterality: N/A;  afib/afl, PVI (re-do)/CTI, RFA, REENA (cx if SR), SHADY, anes, MB, 3 Prep    APPLICATION OF WOUND VACUUM-ASSISTED CLOSURE DEVICE Left 8/3/2020    Procedure: APPLICATION, WOUND VAC;  Surgeon: SEMAJ Márquez III, MD;  Location: [facility] OR 2ND FLR;  Service: Peripheral Vascular;  Laterality: Left;    APPLICATION OF WOUND VACUUM-ASSISTED CLOSURE DEVICE Left 8/6/2020    Procedure: APPLICATION, WOUND VAC;  Surgeon: SEMAJ Márquez III, MD;  Location: [facility] OR 2ND FLR;  Service: Peripheral Vascular;  Laterality: Left;    CARPAL TUNNEL RELEASE Right 6/10/2020    Procedure: RELEASE, CARPAL TUNNEL - RIGHT;  Surgeon: Adelaida Hall MD;  Location: [facility];  Service: Orthopedics;  Laterality: Right;  GENERAL AND REGIONAL    CARPAL TUNNEL RELEASE Left 5/5/2021    Procedure: RELEASE, CARPAL TUNNEL, LEFT;  Surgeon: Adelaida Hall MD;  Location: [facility];  Service: Orthopedics;  Laterality: Left;  GENERAL & REGIONAL IN PACU    CLOSURE OF WOUND Left 8/6/2020    Procedure: CLOSURE, WOUND;  Surgeon: SEMAJ Márquez III, MD;  [facility];  Service: Peripheral Vascular;  Laterality: Left;  Complex    COLONOSCOPY N/A 8/17/2021    Procedure: COLONOSCOPY Suprep;  Surgeon: Loco Deluca MD;  Location: [facility];  Service: Endoscopy;  Laterality: N/A;    ESOPHAGOGASTRODUODENOSCOPY N/A 8/17/2021    Procedure: EGD (ESOPHAGOGASTRODUODENOSCOPY);  Surgeon: Loco Dleuca MD;  Location: [facility];  Service: Endoscopy;  Laterality: N/A;  Patient is schedule to have her Covid test on 08/14/2021 at the [facility] @ 9:30am. [place].    EXPLORATION OF FEMORAL ARTERY Left 7/21/2020    Procedure: EXPLORATION, ARTERY, FEMORAL;  Surgeon: SEMAJ Márquez III, MD;  Location: [facility] OR 2ND FLR;  Service: Peripheral Vascular;  Laterality: Left;  1. Urgent Direct repair, L SFA branch laceration    FOOT SURGERY      HYSTEROSCOPY WITH DILATION AND CURETTAGE OF UTERUS N/A 2/19/2022     Procedure: HYSTEROSCOPY, WITH DILATION AND CURETTAGE OF UTERUS;  Surgeon: Shane Palomo MD;  Location: [facility] OR [address] [place] [address];  Service: OB/GYN;  Laterality: N/A;    INCISION AND DRAINAGE OF KNEE Left 5/12/2022    Procedure: INCISION AND DRAINAGE, Prepatellar bursectomy.;  Surgeon: Dre Guzmán MD;  Location: [facility] OR [address] [place] [address];  Service: Orthopedics;  Laterality: Left;    TREATMENT OF CARDIAC ARRHYTHMIA N/A 1/29/2020    Procedure: CARDIOVERSION;  Surgeon: Gabriel Hawley MD;  Location: [facility] EP LAB;  Service: Cardiology;  Laterality: N/A;  af, demi, dccv, anes, mb, 345    TREATMENT OF CARDIAC ARRHYTHMIA  1/24/2022    Procedure: Cardioversion or Defibrillation;  Surgeon: Gabriel Hawley MD;  Location: [facility] EP LAB;  Service: Cardiology;;    WOUND DEBRIDEMENT Left 8/6/2020    Procedure: DEBRIDEMENT, WOUND;  Surgeon: SEMAJ Márquez III, MD;  Location: [facility] OR [address] [place] [address];  Service: Peripheral Vascular;  Laterality: Left;      Family History:   Family History   Problem Relation Age of Onset    Cataracts Mother     Hypertension Mother     Thyroid disease Mother     Diabetes Father     Hypertension Father     Hypertension Sister     Hypertension Brother     Amblyopia Neg Hx     Blindness Neg Hx     Cancer Neg Hx     Glaucoma Neg Hx     Macular degeneration Neg Hx     Retinal detachment Neg Hx     Strabismus Neg Hx     Stroke Neg Hx      Social History:  - Tobacco: former smoker, quit 2019, unclear smoking amount  - Alcohol: denies per chart review  - Drugs: denies per chart review     Allergies:  Review of patient's allergies indicates:   Allergen Reactions    Flecainide Shortness Of Breath and Swelling    Vancomycin analogues Hives, Itching and Rash     Home Medications:  Current Outpatient Medications   Medication Instructions    acetaminophen (TYLENOL) 500 MG tablet Take 2 tablets (1,000 mg) by mouth at onset of pain or headache, may repeat if needed.    ALPRAZolam (XANAX) 0.5 mg, Oral, 2 times  daily PRN    atorvastatin (LIPITOR) 40 mg, Oral, Daily    b complex vitamins capsule 1 capsule, Oral, Daily    colchicine (COLCRYS) 0.6 mg tablet For gout attack: Take TWO tablets by mouth, then, 2 hours later, take ONE additional tablet. Then take 1 tablet twice daily only if still needed for gout pain.    DULoxetine (CYMBALTA) 120 mg, Oral, Daily    ELIQUIS 5 mg, Oral, 2 times daily    ferrous sulfate (SLOW RELEASE IRON) 142 mg, Oral, Daily, FOR 7 DAY THEN INCREASE TO TWICE DAILY AS TOLERATED    furosemide (LASIX) 40 mg, Oral, 2 times daily, Resume as needed for edema until followup with your PCP.    gluc-shikha-[facility]#6-T-xgke-reymundo-bor 750 mg-644 mg- 30 mg-1 mg Tab 1 tablet, Oral, Daily    ibuprofen (ADVIL,MOTRIN) 600 mg, Oral, Every 6 hours PRN    krill/om-3/dha/epa/phospho/ast (MEGARED OMEGA-3 KRILL OIL ORAL) 1 capsule, Oral, Daily    lisinopriL (PRINIVIL,ZESTRIL) 40 mg, Oral, Daily    melatonin 10 mg Tab 1-2 tablets, Oral, Nightly PRN    metoprolol succinate (TOPROL-XL) 50 mg, Oral, 2 times daily    multivitamin (THERAGRAN) per tablet 1 tablet, Oral, Daily    NIFEdipine (PROCARDIA-XL) 90 mg, Oral, Daily, HOLD UNTIL FOLLOW-UP WITH YOUR PCP.    omeprazole (PRILOSEC) 20 MG capsule TAKE 1 CAPSULE BY MOUTH ONCE DAILY    oxyCODONE-acetaminophen (PERCOCET) 5-325 mg per tablet 1 tablet, Oral, Every 4 hours PRN    potassium chloride SA (K-DUR,KLOR-CON) 20 MEQ tablet 20 mEq, Oral, Daily, ONLY TAKE WITH LASIX.    predniSONE (DELTASONE) 10 MG tablet Take 1 tablet by mouth daily    propafenone (RYTHMOL) 225 mg, Oral, Every 8 hours    senna (SENNA) 8.6 mg tablet Oral, 2 times daily PRN    traZODone (DESYREL) 100 mg, Oral, Nightly PRN      OBJECTIVE DATA:      Vital Signs:  Vitals:    08/10/22 0645   BP:    Pulse: (!) 59   Resp: (!) 21   Temp:      Intake/Output:    Intake/Output Summary (Last 24 hours) at 8/10/2022 0738  Last data filed at 8/10/2022 0600  Gross per 24 hour   Intake 4516.53 ml   Output 860 ml    Net 3656.53 ml     Physical Exam:  Constitutional: intubated, on propofol but awake to voice  HEENT: NCAT, EOMI, MMM  Neck: Supple, normal ROM  Resp: CTABL, no wheeze  Cardio: RRR, S1 and S2 normal  GI: obese, soft, non-tender, bowel sounds active  Extremities: No edema, peripheral pulses 2+ and symmetric  Skin: No rashes, bruises, or lesions  Neurologic: moving extremities and mouthing words over ETT, following commands     Laboratory/Imaging:  Recent Labs   Lab 22  0947 22  1750 22  2301 08/10/22  0337 08/10/22  0400   WBC 6.08  --   --   --  5.69   HGB 9.9*  --   --   --  7.9*   HCT 33.5* 28* 27*  --  26.6*     --   --   --  146*     --   --  137  --    K 3.6  --   --  5.1  --      --   --  104  --    CREATININE 0.8  --   --  1.1  --    BUN 9  --   --  10  --    CO2 26  --   --  17*  --    ALT  --   --   --  13  --    AST  --   --   --  63*  --      Microbiology:  None     Imagin22 CXR: ET tube is visualized with distal tip approximately 2.5 cm above the john.  Heart is enlarged but stable in size.  Lungs are hypoinflated.  Improved aeration is however seen within the lungs from earlier exam.  No evidence of new focal consolidation, pneumothorax, or large pleural effusion.  Enteric tube extends below the diaphragm outside of field of view.    Medications:   acetaminophen  1,000 mg Oral Q6H    apixaban  5 mg Oral BID    atorvastatin  40 mg Oral Daily    DULoxetine  120 mg Oral Daily    metoprolol succinate  50 mg Oral BID    pantoprazole  40 mg Oral Daily    propafenone  225 mg Oral Q8H    senna-docusate 8.6-50 mg  1 tablet Oral BID         lactated ringers 40 mL/hr at 08/10/22 0600    propofoL 50 mcg/kg/min (08/10/22 0624)        sodium chloride, acetaminophen, albuterol-ipratropium, ALPRAZolam, calcium carbonate, fentaNYL, hydrALAZINE, HYDROmorphone, ketorolac, magnesium hydroxide 400 mg/5 ml, melatonin, meperidine, midazolam, naloxone, ondansetron,  ondansetron, opium-belladonna 16.2-60 mg, oxyCODONE, oxyCODONE, polyethylene glycol, prochlorperazine, prochlorperazine, simethicone, sodium chloride 0.9%, sodium chloride 0.9%, traZODone     Assessment/Plan:     Neuro  - remains awake and anxious on propofol  - have added fentanyl gtt     Cardiovascular  Atrial Fibrillation  - continue home metoprolol and propafenone  - on eliquis for AC    HTN  - on lisinopril, nifedipine, and metoprolol at home  - holding in setting of normotension     Respiratory  Suspected Airway Edema  - passed SBT this am, however, no cuff leak when tested today  - suspicious that airway remains edematous, holding off on extubation for today, will test again in am  - ordered additional dose 80 solumedrol and racemic epi nebs    ERENDIRA  - noted on chart review, PSG noted  - has not had CPAP titration study  - CPAP nightly following extubation     GI//FEN  S/p RA-TLH/BSO/Cystoscopy  - care per gynecology team    GERD  - continue home PPI     F: net positive 3.6L  E: replete prn  N: NPO, if remains intubated tomorrow would start TF     Renal   Volume Status  - UOP 710cc in past 24h  - net positive 3.6L since admit  - CXR c/f some increased interstitial edema, last TTE 2021 without HF  have stopped continuous fluids and ordered dose of lasix to optimize for extubation tomorrow   - goal net even      Heme  Normocytic Anemia  - hgb 7.9 today, baseline appears to be between 8-9  - no signs of active bleeding  - transfusion goal >7, would hold today's transfusion out of concerns for volume status     Endocrine  - BG goal 140s-180s  - not requiring insulin currently      Infectious Disease  - no active issues    Feeding: NPO  Analgesia: fentanyl  Sedation: fent, prop  Thrombo PPX: eliquis  Head of Bed: > 30 degrees  Ulcer PPX: protonix  Glucose: 110s without insulin  SBT/SAT: daily  Bowel Regimen: senna  Indwelling Lines: PIV x4, branch, ETT, OGT  Abx: none    Code: Full     Daisy Montgomery MD  [facility]  Pulmonary/Critical Care Fellow  08/10/2022 7:38 AM  [phone]

## 2022-08-10 NOTE — PLAN OF CARE
".BP (!) 122/58   Pulse (!) 58   Temp 98.8 °F (37.1 °C) (Oral)   Resp (!) 22   Ht 5' 5" (1.651 m)   Wt 112.9 kg (248 lb 14.4 oz)   LMP 03/25/2022 (Exact Date)   SpO2 99%   Breastfeeding No   BMI 41.42 kg/m²     .[facility] - Intensive Care ([place])  ICU Shift Summary  Date: 8/10/2022      Prehospitalization: Home  Admit Date / LOS : 8/9/2022/ 0 days    Diagnosis:   S/P robot-assisted surgical procedure    Consults:        Active: N/A       Needed: OT and PT     Code Status: Prior   Advanced Directive: <no information>    LDA:  Lines/Drains/Airways       Drain  Duration                  NG/OG Tube 08/09/22 2000 orogastric Center mouth <1 day         Urethral Catheter 08/09/22 1102 Non-latex;Straight-tip 16 Fr. <1 day              Airway  Duration                  Airway - Non-Surgical 08/09/22 1053 <1 day              Peripheral Intravenous Line  Duration                  Peripheral IV - Single Lumen 05/11/22 1026 Posterior;Right Forearm 90 days         Peripheral IV - Single Lumen 05/11/22 1030 Left;Posterior Forearm 90 days         Peripheral IV - Single Lumen 08/09/22 0929 20 G Left Forearm <1 day         Peripheral IV - Single Lumen 08/10/22 0400 22 G Posterior;Right Hand <1 day                  Central Lines/Site/Justification:  Urinary Cath/Order/Justification:Critically Ill in the ICU and requiring intensive monitoring and Post Operative - Urologic, GYN, Perineal Procedures    Vasopressors/Infusions:    lactated ringers 40 mL/hr at 08/10/22 0121    propofoL 50 mcg/kg/min (08/10/22 0332)          GOALS: Volume/ Hemodynamic: N/A                     RASS: -2  light sedation, briefly awakes to voice (eye opening)    Pain Management: IV       Pain Controlled: yes     Rhythm: NSR    Respiratory Device: Vent    Vent Mode: A/C  Oxygen Concentration (%):  [50-75] 50  Resp Rate Total:  [17 br/min-37 br/min] 18 br/min  Vt Set:  [340 mL-400 mL] 400 mL  PEEP/CPAP:  [5 cmH20-8 cmH20] 8 cmH20  Mean Airway Pressure:  " [5.8 ceV60-57 cmH20] 10 cmH20             Most Recent SBT/ SAT:        MOVE Screen: PT Consult  ICU Liberation: yes    VTE Prophylaxis: Mechanical  Mobility: Bedrest  Stress Ulcer Prophylaxis: Yes    Isolation: No active isolations    Dietary:   Current Diet Order   Procedures    Diet general    Diet Adult Regular (IDDSI Level 7)      Tolerance: not applicable  Advancement:     I & O (24h):    Intake/Output Summary (Last 24 hours) at 8/10/2022 0544  Last data filed at 8/10/2022 0000  Gross per 24 hour   Intake 4034.7 ml   Output 710 ml   Net 3324.7 ml        Restraints: Yes    Significant Dates:  Post Op Date:  1  Rescue Date: N/A  Imaging/ Diagnostics: N/A    Noteworthy Labs:  none    COVID Test: unknown  CBC/Anemia Labs: Coags:    Recent Labs   Lab 08/05/22  0947 08/09/22  1750 08/09/22  2301 08/10/22  0400   WBC 6.08  --   --  5.69   HGB 9.9*  --   --  7.9*   HCT 33.5*   < > 27* 26.6*     --   --  146*   MCV 84  --   --  84   RDW 18.3*  --   --  18.2*    < > = values in this interval not displayed.    No results for input(s): PT, INR, APTT in the last 168 hours.     Chemistries:   Recent Labs   Lab 08/05/22  0947 08/10/22  0337    137   K 3.6 5.1    104   CO2 26 17*   BUN 9 10   CREATININE 0.8 1.1   CALCIUM 9.0 8.4*   PROT  --  6.9   BILITOT  --  0.7   ALKPHOS  --  77   ALT  --  13   AST  --  63*   MG  --  1.6        Cardiac Enzymes: Ejection Fractions:    No results for input(s): CPK, CPKMB, MB, TROPONINI in the last 72 hours. EF   Date Value Ref Range Status   06/30/2021 60 % Final        POCT Glucose: HbA1c:    Recent Labs   Lab 08/09/22  0801 08/09/22  2158   POCTGLUCOSE 122* 136*    Hemoglobin A1C   Date Value Ref Range Status   05/07/2022 5.7 (H) 4.0 - 5.6 % Final     Comment:     ADA Screening Guidelines:  5.7-6.4%  Consistent with prediabetes  >or=6.5%  Consistent with diabetes    High levels of fetal hemoglobin interfere with the HbA1C  assay. Heterozygous hemoglobin variants (HbS,  HgC, etc)do  not significantly interfere with this assay.   However, presence of multiple variants may affect accuracy.             ICU LOS 13h  Level of Care: Critical Care    Chart Check: 24 HR Done  Shift Summary/Plan for the shift: Wean propofol off and ventilator settings wean to extubate. Plan to discontinue branch and start on diet as tolerated. Continue to monitor incision sites. VSS. Pain and anxiety control with PO scheduled and prn medication.

## 2022-08-10 NOTE — EICU
Uncomfortable on vent  Use fentanyl prn  Add midazolam prn   On propofol  Check vent parameters and redo CXR to assess ET position     2145 D/w RT; CXR better  Increase Vt to 400, PEEP to 8  RR to 14  Pplat 25  ABG in 1 h    0253 ABG in AM

## 2022-08-11 LAB
ALLENS TEST: ABNORMAL
ANION GAP SERPL CALC-SCNC: 12 MMOL/L (ref 8–16)
BASOPHILS # BLD AUTO: 0.02 K/UL (ref 0–0.2)
BASOPHILS NFR BLD: 0.4 % (ref 0–1.9)
BLD PROD TYP BPU: NORMAL
BLOOD UNIT EXPIRATION DATE: NORMAL
BLOOD UNIT TYPE CODE: 9500
BLOOD UNIT TYPE: NORMAL
BUN SERPL-MCNC: 15 MG/DL (ref 6–20)
CALCIUM SERPL-MCNC: 8.3 MG/DL (ref 8.7–10.5)
CHLORIDE SERPL-SCNC: 102 MMOL/L (ref 95–110)
CO2 SERPL-SCNC: 24 MMOL/L (ref 23–29)
CODING SYSTEM: NORMAL
CREAT SERPL-MCNC: 1.2 MG/DL (ref 0.5–1.4)
DELSYS: ABNORMAL
DIFFERENTIAL METHOD: ABNORMAL
DISPENSE STATUS: NORMAL
EOSINOPHIL # BLD AUTO: 0 K/UL (ref 0–0.5)
EOSINOPHIL NFR BLD: 0.4 % (ref 0–8)
ERYTHROCYTE [DISTWIDTH] IN BLOOD BY AUTOMATED COUNT: 18 % (ref 11.5–14.5)
ERYTHROCYTE [SEDIMENTATION RATE] IN BLOOD BY WESTERGREN METHOD: 18 MM/H
EST. GFR  (NO RACE VARIABLE): 52 ML/MIN/1.73 M^2
FIO2: 50
GLUCOSE SERPL-MCNC: 93 MG/DL (ref 70–110)
HCO3 UR-SCNC: 29 MMOL/L (ref 24–28)
HCT VFR BLD AUTO: 27.1 % (ref 37–48.5)
HCT VFR BLD CALC: 28 %PCV (ref 36–54)
HGB BLD-MCNC: 10 G/DL
HGB BLD-MCNC: 7.7 G/DL (ref 12–16)
IMM GRANULOCYTES # BLD AUTO: 0.02 K/UL (ref 0–0.04)
IMM GRANULOCYTES NFR BLD AUTO: 0.4 % (ref 0–0.5)
LYMPHOCYTES # BLD AUTO: 0.9 K/UL (ref 1–4.8)
LYMPHOCYTES NFR BLD: 16.2 % (ref 18–48)
MCH RBC QN AUTO: 24.7 PG (ref 27–31)
MCHC RBC AUTO-ENTMCNC: 28.4 G/DL (ref 32–36)
MCV RBC AUTO: 87 FL (ref 82–98)
MODE: ABNORMAL
MONOCYTES # BLD AUTO: 0.4 K/UL (ref 0.3–1)
MONOCYTES NFR BLD: 7.1 % (ref 4–15)
NEUTROPHILS # BLD AUTO: 4.2 K/UL (ref 1.8–7.7)
NEUTROPHILS NFR BLD: 75.5 % (ref 38–73)
NRBC BLD-RTO: 0 /100 WBC
PCO2 BLDA: 53.1 MMHG (ref 35–45)
PEEP: 5
PH SMN: 7.35 [PH] (ref 7.35–7.45)
PLATELET # BLD AUTO: 147 K/UL (ref 150–450)
PMV BLD AUTO: 10.7 FL (ref 9.2–12.9)
PO2 BLDA: 34 MMHG (ref 80–100)
POC BE: 3 MMOL/L
POC IONIZED CALCIUM: 1.16 MMOL/L (ref 1.06–1.42)
POC SATURATED O2: 62 % (ref 95–100)
POC TCO2: 31 MMOL/L (ref 23–27)
POTASSIUM BLD-SCNC: 3.7 MMOL/L (ref 3.5–5.1)
POTASSIUM SERPL-SCNC: 3.8 MMOL/L (ref 3.5–5.1)
RBC # BLD AUTO: 3.12 M/UL (ref 4–5.4)
SAMPLE: ABNORMAL
SITE: ABNORMAL
SODIUM BLD-SCNC: 137 MMOL/L (ref 136–145)
SODIUM SERPL-SCNC: 138 MMOL/L (ref 136–145)
TRANS ERYTHROCYTES VOL PATIENT: NORMAL ML
VT: 400
WBC # BLD AUTO: 5.5 K/UL (ref 3.9–12.7)

## 2022-08-11 PROCEDURE — 63600175 PHARM REV CODE 636 W HCPCS: Performed by: STUDENT IN AN ORGANIZED HEALTH CARE EDUCATION/TRAINING PROGRAM

## 2022-08-11 PROCEDURE — 94761 N-INVAS EAR/PLS OXIMETRY MLT: CPT

## 2022-08-11 PROCEDURE — 99900026 HC AIRWAY MAINTENANCE (STAT)

## 2022-08-11 PROCEDURE — 25000003 PHARM REV CODE 250: Performed by: STUDENT IN AN ORGANIZED HEALTH CARE EDUCATION/TRAINING PROGRAM

## 2022-08-11 PROCEDURE — 85025 COMPLETE CBC W/AUTO DIFF WBC: CPT | Performed by: INTERNAL MEDICINE

## 2022-08-11 PROCEDURE — 36600 WITHDRAWAL OF ARTERIAL BLOOD: CPT

## 2022-08-11 PROCEDURE — 80048 BASIC METABOLIC PNL TOTAL CA: CPT | Performed by: INTERNAL MEDICINE

## 2022-08-11 PROCEDURE — 82803 BLOOD GASES ANY COMBINATION: CPT

## 2022-08-11 PROCEDURE — 20000000 HC ICU ROOM

## 2022-08-11 PROCEDURE — 99900035 HC TECH TIME PER 15 MIN (STAT)

## 2022-08-11 PROCEDURE — 27000221 HC OXYGEN, UP TO 24 HOURS

## 2022-08-11 RX ORDER — FUROSEMIDE 10 MG/ML
40 INJECTION INTRAMUSCULAR; INTRAVENOUS 2 TIMES DAILY
Status: DISCONTINUED | OUTPATIENT
Start: 2022-08-11 | End: 2022-08-13 | Stop reason: HOSPADM

## 2022-08-11 RX ADMIN — METHYLPREDNISOLONE SODIUM SUCCINATE 20 MG: 40 INJECTION, POWDER, FOR SOLUTION INTRAMUSCULAR; INTRAVENOUS at 11:08

## 2022-08-11 RX ADMIN — FUROSEMIDE 40 MG: 10 INJECTION, SOLUTION INTRAMUSCULAR; INTRAVENOUS at 08:08

## 2022-08-11 RX ADMIN — OXYCODONE 5 MG: 5 TABLET ORAL at 03:08

## 2022-08-11 RX ADMIN — ALPRAZOLAM 0.5 MG: 0.5 TABLET ORAL at 09:08

## 2022-08-11 RX ADMIN — ACETAMINOPHEN 1000 MG: 500 TABLET, FILM COATED ORAL at 11:08

## 2022-08-11 RX ADMIN — Medication 200 MCG/HR: at 05:08

## 2022-08-11 RX ADMIN — ACETAMINOPHEN 1000 MG: 500 TABLET, FILM COATED ORAL at 12:08

## 2022-08-11 RX ADMIN — FENTANYL CITRATE 25 MCG: 50 INJECTION, SOLUTION INTRAMUSCULAR; INTRAVENOUS at 01:08

## 2022-08-11 RX ADMIN — DEXMEDETOMIDINE HYDROCHLORIDE 0.7 MCG/KG/HR: 4 INJECTION, SOLUTION INTRAVENOUS at 10:08

## 2022-08-11 RX ADMIN — METHYLPREDNISOLONE SODIUM SUCCINATE 20 MG: 40 INJECTION, POWDER, FOR SOLUTION INTRAMUSCULAR; INTRAVENOUS at 08:08

## 2022-08-11 RX ADMIN — DEXMEDETOMIDINE HYDROCHLORIDE 0.9 MCG/KG/HR: 4 INJECTION, SOLUTION INTRAVENOUS at 07:08

## 2022-08-11 RX ADMIN — METHYLPREDNISOLONE SODIUM SUCCINATE 20 MG: 40 INJECTION, POWDER, FOR SOLUTION INTRAMUSCULAR; INTRAVENOUS at 05:08

## 2022-08-11 RX ADMIN — PROPAFENONE HYDROCHLORIDE 225 MG: 150 TABLET, FILM COATED ORAL at 06:08

## 2022-08-11 RX ADMIN — SENNOSIDES AND DOCUSATE SODIUM 1 TABLET: 50; 8.6 TABLET ORAL at 09:08

## 2022-08-11 RX ADMIN — ACETAMINOPHEN 1000 MG: 500 TABLET, FILM COATED ORAL at 05:08

## 2022-08-11 RX ADMIN — DEXMEDETOMIDINE HYDROCHLORIDE 0.2 MCG/KG/HR: 4 INJECTION, SOLUTION INTRAVENOUS at 05:08

## 2022-08-11 RX ADMIN — DEXMEDETOMIDINE HYDROCHLORIDE 0.7 MCG/KG/HR: 4 INJECTION, SOLUTION INTRAVENOUS at 03:08

## 2022-08-11 RX ADMIN — PROPAFENONE HYDROCHLORIDE 225 MG: 150 TABLET, FILM COATED ORAL at 02:08

## 2022-08-11 RX ADMIN — OXYCODONE 5 MG: 5 TABLET ORAL at 11:08

## 2022-08-11 RX ADMIN — FUROSEMIDE 40 MG: 10 INJECTION, SOLUTION INTRAMUSCULAR; INTRAVENOUS at 09:08

## 2022-08-11 RX ADMIN — ACETAMINOPHEN 1000 MG: 500 TABLET, FILM COATED ORAL at 06:08

## 2022-08-11 RX ADMIN — APIXABAN 5 MG: 2.5 TABLET, FILM COATED ORAL at 08:08

## 2022-08-11 RX ADMIN — APIXABAN 5 MG: 2.5 TABLET, FILM COATED ORAL at 09:08

## 2022-08-11 RX ADMIN — HYDRALAZINE HYDROCHLORIDE 10 MG: 20 INJECTION INTRAMUSCULAR; INTRAVENOUS at 09:08

## 2022-08-11 RX ADMIN — ATORVASTATIN CALCIUM 40 MG: 20 TABLET, FILM COATED ORAL at 08:08

## 2022-08-11 RX ADMIN — SENNOSIDES AND DOCUSATE SODIUM 1 TABLET: 50; 8.6 TABLET ORAL at 08:08

## 2022-08-11 RX ADMIN — Medication 162.5 MCG/HR: at 12:08

## 2022-08-11 NOTE — PROGRESS NOTES
"[facility] - Intensive Care ([place])  Gynecology   Progress Note    Patient Name: Vicky Alvarado  MRN: [id number]  Admission Date: 8/9/2022  Primary Care Provider: Gordy Cordero MD  Principal Problem: S/P robot-assisted surgical procedure    Subjective:     Interval History: POD#2 s/p RA-TLH/BSO/Cystoscopy.   Patient unable to be extubated postop due to airway edema and decision made to transfer to the ICU for higher level of care to remained intubated and sedated until safe to extubate.     Patient doing well this morning. She is alert and able to answer yes/no questions by shaking her head as she remains intubated. She is able to gesture that she wants the tube out and asked me "why" her tube was still in. I explained the airway edema and she seemed to understand this answer. She is not combative, but is visibly uncomfortable with tube in place. She is able to adjust her position in the bed and sit up to get more comfortable.  She remains mildly sedated (fentanyl, versed, propofol) but is easily arousable and non-combative. Gurrola catheter remains in place.       Scheduled Meds:   acetaminophen  1,000 mg Oral Q6H    apixaban  5 mg Oral BID    atorvastatin  40 mg Oral Daily    DULoxetine  120 mg Oral Daily    metoprolol succinate  50 mg Oral BID    pantoprazole  40 mg Oral Daily    propafenone  225 mg Oral Q8H    senna-docusate 8.6-50 mg  1 tablet Oral BID     Continuous Infusions:   dexmedetomidine (PRECEDEX) infusion 0.7 mcg/kg/hr (08/11/22 0719)    fentanyl 150 mcg/hr (08/11/22 0719)    midazolam 0.5 mg/hr (08/11/22 0719)    propofoL 10 mcg/kg/min (08/11/22 0719)     PRN Meds:sodium chloride, acetaminophen, albuterol-ipratropium, ALPRAZolam, calcium carbonate, fentaNYL, hydrALAZINE, HYDROmorphone, ketorolac, magnesium hydroxide 400 mg/5 ml, melatonin, midazolam, naloxone, ondansetron, ondansetron, opium-belladonna 16.2-60 mg, oxyCODONE, oxyCODONE, polyethylene glycol, prochlorperazine, " prochlorperazine, racepinephrine, simethicone, sodium chloride 0.9%, sodium chloride 0.9%, traZODone    Review of patient's allergies indicates:   Allergen Reactions    Flecainide Shortness Of Breath and Swelling    Vancomycin analogues Hives, Itching and Rash       Objective:     Vital Signs (Most Recent):  Temp: 98.7 °F (37.1 °C) (08/11/22 0300)  Pulse: (!) 54 (08/11/22 0630)  Resp: 18 (08/11/22 0630)  BP: (!) 121/55 (08/11/22 0630)  SpO2: (!) 92 % (08/11/22 0630) Vital Signs (24h Range):  Temp:  [98.2 °F (36.8 °C)-98.9 °F (37.2 °C)] 98.7 °F (37.1 °C)  Pulse:  [50-75] 54  Resp:  [16-35] 18  SpO2:  [86 %-99 %] 92 %  BP: ()/(51-82) 121/55     Weight: 112.9 kg (248 lb 14.4 oz)  Body mass index is 41.42 kg/m².  Patient's last menstrual period was 03/25/2022 (exact date).    I&O (Last 24H):    Intake/Output Summary (Last 24 hours) at 8/11/2022 0753  Last data filed at 8/11/2022 0719  Gross per 24 hour   Intake 1397.28 ml   Output 1097 ml   Net 300.28 ml       Physical Exam:   Constitutional: Remains intubated w/ OG in place. Arousable and noncombative, able to answer straightforward yes/no questions. Facial edema resolved.   Cardiovascular: Normal rate, regular rhythm, normal heart sounds   Pulmonary/Chest: Intubated with normal chest rise. She has no wheezes.      Abdominal: Incisions clean, dry, and intact, with steri-strips in place. Soft. Non-distended. Obese habitus. Nontender to palpation diffusely  Genitourinary: No blood on chux pad, branch in place draining clear yellow urine   Neurological: Easily arousable and able to answer questions appropriately. Attempts to speak around the tube, able to mouth simple words and gesture.   Psychiatric: Visibly distressed by intubation, noncombative.    Laboratory:  CBC:   Recent Labs   Lab 08/11/22  0353 08/11/22  0415   WBC 5.50  --    RBC 3.12*  --    HGB 7.7*  --    HCT 27.1* 28*   *  --    MCV 87  --    MCH 24.7*  --    MCHC 28.4*  --      CMP:   Recent  Labs   Lab 08/10/22  0337 08/10/22  1254 08/11/22  0353   *   < > 93   CALCIUM 8.4*   < > 8.3*   ALBUMIN 3.7  --   --    PROT 6.9  --   --       < > 138   K 5.1   < > 3.8   CO2 17*   < > 24      < > 102   BUN 10   < > 15   CREATININE 1.1   < > 1.2   ALKPHOS 77  --   --    ALT 13  --   --    AST 63*  --   --    BILITOT 0.7  --   --     < > = values in this interval not displayed.       Assessment/Plan:     Active Diagnoses:    Diagnosis Date Noted POA    PRINCIPAL PROBLEM:  s/p RA-TLH/BSO [Z98.890] 08/09/2022 Not Applicable    Acute hypoxemic respiratory failure [J96.01]  Yes    Laryngeal edema [J38.4]  Yes    Longstanding persistent atrial fibrillation [I48.11]  Yes      Problems Resolved During this Admission:       Postoperative Airway Edema   - Immediately postop, anesthesia attempted extubation, however, they were unable to extubate due to suspected airway edema. Attempted extubation for a second time in PACU after administration of steroids/nebs, however, unable to safely extubate  - Overnight, patient remained intubated and sedated on Propofol   - Vent settings managed per Pulm/Crit, appreciate assistance with this complicated case   - Failed cuff leak again this AM (8/11), per pulm crit, will obtain ENT consult and plan for extubation tomorrow 8/12    Routine Post-Op Care:  POD#2, remains intubated/seation in ICU as noted in problem above   - Continue IVF this AM  - Gurrola remains in place with marginal UOP (0.29cc/kg/hr)  - AM CBC significant for stable  H/H 8/27, Plt 147  - SCDs and eliquis for DVT prophylaxis  - Continue scheduled colace  - Pain regimen: remains on fentanyl gtt per ICU for sedation, scheduled tylenol/dilaudid BTP, Transition to PO meds when patient extubated     Atrial Fibrillation with RVR   - Home metoprolol and propafenone continued   - Telemetry while inpatient, normal rhythm overnight   - Home eliquis continued     CHF  - Home metoprolol continued  - remains on  Tele     Depression/Anxiety   - Home duloxetine continued   - midazolam PRN for anxiety per ICU   - trazodone PRN for insomnia when no longer sedated     GERD  - Scheduled protonix     HTN  - Home lisinopril, lasix, procardia held   - Hydralazine PRN for SBP >180       HLD   - home statin continued     MO   - BMI 45   - SCDs   - Home eliquis     ERENDIRA   - Currently intubated, plan for home CPAP when extubated     Abnormal Liver Appearance   - Abnormally appearing liver noted on abdominal entry during surgery, general surgery called for evaluation   - Suspect micronodular cirrhosis   - Will notify pt when more alert  - AST/ALT mildly elevated this AM 63/13  - Refer to GI/Hepatology outpatient    Disposition: Plan for extubation 8/12 per pulm/crit. When extubated will assess stability for transfer to floor and adjust orders to encourage advancement of postop milestones.    Jodee Nascimento MD  Gynecology   [facility] - Intensive Care ([place])

## 2022-08-11 NOTE — PROGRESS NOTES
[facility] Intensive Care ([place])  Wound Care    Patient Name:  Vicky Alvarado   MRN:  [id number]  Date: 8/11/2022  Diagnosis: S/P robot-assisted surgical procedure    History:     Past Medical History:   Diagnosis Date    Anticoagulant long-term use     Arthritis     Atrial fibrillation     cardioversion    Atrial fibrillation with RVR     Avascular necrosis     L hand    CHF (congestive heart failure)     Depression     Encounter for blood transfusion 07/22/2020    Encounter for blood transfusion 03/2022    GERD (gastroesophageal reflux disease)     Hx of psychiatric care     Hypertension     Iron deficiency anemia 5/7/2022    TIBC 444 with saturated iron 8    Obese     Pre-diabetes 5/7/2022    Psychiatric problem     Sleep apnea     wears cpap       Social History     Socioeconomic History    Marital status:    Tobacco Use    Smoking status: Former Smoker     Types: Cigarettes     Quit date: 7/1/2019     Years since quitting: 3.1    Smokeless tobacco: Never Used   Substance and Sexual Activity    Alcohol use: No    Drug use: No     Social Determinants of Health     Financial Resource Strain: Low Risk     Difficulty of Paying Living Expenses: Not very hard   Food Insecurity: No Food Insecurity    Worried About Running Out of Food in the Last Year: Never true    Ran Out of Food in the Last Year: Never true   Transportation Needs: No Transportation Needs    Lack of Transportation (Medical): No    Lack of Transportation (Non-Medical): No   Physical Activity: Inactive    Days of Exercise per Week: 0 days    Minutes of Exercise per Session: 0 min   Stress: Stress Concern Present    Feeling of Stress : To some extent   Social Connections: Moderately Isolated    Frequency of Communication with Friends and Family: More than three times a week    Frequency of Social Gatherings with Friends and Family: More than three times a week    Attends Sikh Services: 1 to 4 times  per year    Active Member of Clubs or Organizations: No    Attends Club or Organization Meetings: Never    Marital Status:    Housing Stability: Unknown    Unable to Pay for Housing in the Last Year: No    Unstable Housing in the Last Year: No       Precautions:     Allergies as of 05/30/2022 - Reviewed 05/25/2022   Allergen Reaction Noted    Flecainide Shortness Of Breath and Swelling 07/26/2021    Vancomycin analogues Hives, Itching, and Rash 05/07/2022       WOC Assessment Details/Treatment     Patient identified as being at increased risk for pressure related skin breakdown. [employer] [place]. Pressure injury prevention interventions ordered.       08/11/2022

## 2022-08-11 NOTE — PLAN OF CARE
08/11/22 1624   Discharge Assessment   Assessment Type Discharge Planning Assessment   Confirmed/corrected address, phone number and insurance Yes   Confirmed Demographics Correct on Facesheet   Source of Information health record;patient   Lives With alone   Do you expect to return to your current living situation? Yes   Do you have help at home or someone to help you manage your care at home? Yes   Prior to hospitilization cognitive status: Alert/Oriented   Current cognitive status: Alert/Oriented   Walking or Climbing Stairs Difficulty none   Dressing/Bathing Difficulty none   Equipment Currently Used at Home none   Readmission within 30 days? No   Patient currently being followed by outpatient case management? No   Do you currently have service(s) that help you manage your care at home? Yes   Is the pt/caregiver preference to resume services with current agency No   Do you take prescription medications? Yes   Do you have prescription coverage? No   Is the patient taking medications as prescribed? yes   How do you get to doctors appointments? car, drives self;family or friend will provide   Are you on dialysis? No   Do you take coumadin? No   Discharge Plan A Home with family   Discharge Plan B Home Health   DME Needed Upon Discharge  none   Discharge Plan discussed with: Patient   Discharge Barriers Identified None

## 2022-08-11 NOTE — PLAN OF CARE
Problem: Adult Inpatient Plan of Care  Goal: Plan of Care Review  Outcome: Ongoing, Progressing  Goal: Absence of Hospital-Acquired Illness or Injury  Outcome: Ongoing, Progressing  Intervention: Identify and Manage Fall Risk  Flowsheets (Taken 8/11/2022 0547)  Safety Promotion/Fall Prevention:   bed alarm set   diversional activities provided   Fall Risk reviewed with patient/family   Fall Risk signage in place   high risk medications identified   lighting adjusted   medications reviewed   muscle strengthening facilitated   pulse ox   room near unit station   side rails raised x 2  Intervention: Prevent Skin Injury  Flowsheets (Taken 8/11/2022 0547)  Body Position:   weight shifting   turned  Skin Protection:   adhesive use limited   incontinence pads utilized   transparent dressing maintained   tubing/devices free from skin contact   skin-to-device areas padded   silicone foam dressing in place  Intervention: Prevent and Manage VTE (Venous Thromboembolism) Risk  Flowsheets (Taken 8/11/2022 0547)  Activity Management:   Arm raise - L1   Rolling - L1  VTE Prevention/Management:   remove, assess skin, and reapply sequential compression device   bleeding precations maintained   bleeding risk assessed   remove, assess skin, and reapply compression stockings   dorsiflexion/plantar flexion performed   ROM (active) performed  Range of Motion: ROM (range of motion) performed  Goal: Optimal Comfort and Wellbeing  Outcome: Ongoing, Progressing  Intervention: Monitor Pain and Promote Comfort  Flowsheets (Taken 8/11/2022 0547)  Pain Management Interventions:   around-the-clock dosing utilized   care clustered   diversional activity provided   pain management plan reviewed with patient/caregiver   pillow support provided   position adjusted   quiet environment facilitated   relaxation techniques promoted     Problem: Restraint, Nonbehavioral (Nonviolent)  Goal: Absence of Harm or Injury  Outcome: Ongoing,  Progressing  Intervention: Implement Least Restrictive Safety Strategies  Flowsheets (Taken 8/11/2022 0547)  Medical Device Protection: tubing secured  De-Escalation Techniques:   1:1 observation initiated   appropriate behavior reinforced   diversional activity encouraged   medication administered   quiet time facilitated   reoriented   stimulation decreased   verbally redirected  Intervention: Protect Skin and Joint Integrity  Flowsheets (Taken 8/11/2022 0547)  Body Position:   weight shifting   turned  Range of Motion: ROM (range of motion) performed     Problem: Device-Related Complication Risk (Mechanical Ventilation, Invasive)  Goal: Optimal Device Function  Outcome: Ongoing, Progressing  Intervention: Optimize Device Care and Function  Flowsheets (Taken 8/11/2022 0547)  Airway Safety Measures:   mask valve resuscitator at bedside   suction at bedside   oxygen flowmeter at bedside     Problem: Inability to Wean (Mechanical Ventilation, Invasive)  Goal: Mechanical Ventilation Liberation  Outcome: Ongoing, Progressing  Intervention: Promote Extubation and Mechanical Ventilation Liberation  Flowsheets (Taken 8/11/2022 0547)  Sleep/Rest Enhancement:   awakenings minimized   consistent schedule promoted   noise level reduced   regular sleep/rest pattern promoted   relaxation techniques promoted   room darkened  Environmental Support:   calm environment promoted   comfort object encouraged   distractions minimized   environmental consistency promoted   caregiver consistency promoted   rest periods encouraged  Medication Review/Management:   medications reviewed   dosing adjusted   high-risk medications identified   infusion initiated

## 2022-08-11 NOTE — PROGRESS NOTES
[facility] Pulmonary/Critical Care Progress Note      Patient: Vicky Alvarado  Age: 57 y.o.  MRN: [id number]  Admit date: 8/9/2022  LOS: 1 day(s)     SUBJECTIVE:       Patient awake and following commands while intubated and on prop, fent, versed, and precedex. Passing SAT/SBT, but still no cuff leak this am.     OBJECTIVE DATA:      Vital Signs:  Vitals:    08/11/22 0630   BP: (!) 121/55   Pulse: (!) 54   Resp: 18   Temp:      Intake/Output:    Intake/Output Summary (Last 24 hours) at 8/11/2022 0805  Last data filed at 8/11/2022 0719  Gross per 24 hour   Intake 1320.93 ml   Output 1097 ml   Net 223.93 ml     Physical Exam:  Constitutional: intubated and comfortable, on sedation but awake  HEENT: NCAT, EOMI, MMM  Neck: Supple, normal ROM  Resp: CTABL, no wheeze  Cardio: RRR, S1 and S2 normal  GI: obese, soft, non-tender, bowel sounds active  Extremities: No edema, peripheral pulses 2+ and symmetric  Skin: No rashes, bruises, or lesions  Neurologic: moving extremities and mouthing words over ETT, following commands     Laboratory/Imaging:  Recent Labs   Lab 08/10/22  0337 08/10/22  0400 08/10/22  0842 08/10/22  1254 08/10/22  1745 08/11/22  0353 08/11/22  0415   WBC  --  5.69  --  5.99  --  5.50  --    HGB  --  7.9*  --  8.0*  --  7.7*  --    HCT  --  26.6*   < > 27.8* 29* 27.1* 28*   PLT  --  146*  --  153  --  147*  --      --   --  138  --  138  --    K 5.1  --   --  4.3  --  3.8  --      --   --  101  --  102  --    CREATININE 1.1  --   --  1.1  --  1.2  --    BUN 10  --   --  12  --  15  --    CO2 17*  --   --  24  --  24  --    ALT 13  --   --   --   --   --   --    AST 63*  --   --   --   --   --   --     < > = values in this interval not displayed.     Microbiology:  None     Imaging:  None new, prior reviewed    Medications:  Scheduled:   acetaminophen  1,000 mg Oral Q6H    apixaban  5 mg Oral BID    atorvastatin  40 mg Oral Daily    DULoxetine  120 mg Oral Daily    metoprolol succinate  50 mg  Oral BID    pantoprazole  40 mg Oral Daily    propafenone  225 mg Oral Q8H    senna-docusate 8.6-50 mg  1 tablet Oral BID      Continuous:   dexmedetomidine (PRECEDEX) infusion 0.7 mcg/kg/hr (08/11/22 0719)    fentanyl 150 mcg/hr (08/11/22 0719)    midazolam 0.5 mg/hr (08/11/22 0719)    propofoL 10 mcg/kg/min (08/11/22 0719)      PRN:  sodium chloride, acetaminophen, albuterol-ipratropium, ALPRAZolam, calcium carbonate, fentaNYL, hydrALAZINE, HYDROmorphone, ketorolac, magnesium hydroxide 400 mg/5 ml, melatonin, midazolam, naloxone, ondansetron, ondansetron, opium-belladonna 16.2-60 mg, oxyCODONE, oxyCODONE, polyethylene glycol, prochlorperazine, prochlorperazine, racepinephrine, simethicone, sodium chloride 0.9%, sodium chloride 0.9%, traZODone    Assessment/Plan:     Neuro  - remains awake on prop, fent, versed, and precedex  - weaning off sedation this morning      Cardiovascular  Atrial Fibrillation  - continue home metoprolol and propafenone  - on eliquis for AC     HTN  - on lisinopril, nifedipine, and metoprolol at home  - holding in setting of normotension     Respiratory  Suspected Airway Edema  - passed SAT/SBT 8/10, however, no cuff leak when tested, held off on extubation out of concern for persistently edematous airway  - s/p additional dose 80 solumedrol and racemic epi nebs  - this am, failed her cuff leak test again. Will continue steroids and lasix today.      ERENDIRA  - noted on chart review, PSG noted  - has not had CPAP titration study  - CPAP nightly following extubation     GI//FEN  S/p RA-TLH/BSO/Cystoscopy  - care per gynecology team     GERD  - continue home PPI     F: net positive   E: replete prn  N: NPO     Renal   Volume Status  - UOP 952cc in past 24h  - net positive 4.1L since admit  - CXR c/f some increased interstitial edema, last TTE 2021 without HF  - ordered repeat lasix dosing      Heme  Normocytic Anemia  - hgb stable at 7.7, baseline appears to be between 8-9  - no signs of  active bleeding  - transfusion goal >7     Endocrine  - BG goal 140s-180s  - not requiring insulin currently      Infectious Disease  - no active issues     Feeding: NPO  Analgesia: fentanyl  Sedation: fent, prop  Thrombo PPX: eliquis  Head of Bed: > 30 degrees  Ulcer PPX: protonix  Glucose: 110s without insulin  SBT/SAT: daily  Bowel Regimen: senna  Indwelling Lines: PIV x2, branch, ETT, OGT  Abx: none     Code: Full    Thank you for allowing us to participate in the care of this patient. We will continue to follow.      Daisy Montgomery MD  [facility] Pulmonary/Critical Care Fellow  08/11/2022 8:05 AM

## 2022-08-11 NOTE — OP NOTE
DATE OF PROCEDURE:  8/9/22     SURGEON: Yolanda Barriga.        ASSISTANTS: Jodee Nascimento MD and Debora Parks NP     PREOPERATIVE DIAGNOSES: postmenopausal bleeding, Fibroids, history of endometrial polyp, Atrial Fibrillation requiring anticoagulation      POSTOPERATIVE DIAGNOSES: same.  Multinodular liver.  Endometriosis, cyst of left ovary     PROCEDURES:  Robotic-assisted total laparoscopic hysterectomy and bilateral   Salpingoophorectomy, lysis of adhesions, diagnostic cystoscopy.      COMPLICATIONS: None     ESTIMATED BLOOD LOSS: 150 cc     ANESTHESIA: GETA     INTRAOPERATIVE FINDINGS:  16 cm bulky uterus with fibroids.  She had normal bilateral tubes and right ovary.  left ovary with a 3 cm simple appearing cyst with clear fluid.  Adhesions between colon and left pelvic sidewall and bilateral ovaries to respective sidewalls.  Endometriosis implants noted in cul-de-sac Bilateral ureteral efflux and intact bladder noted on diagnostic cystoscopy post hysterectomy.  Exam of upper abdomen revealed enlarged gallbladder and multiple nodules covering entire surface of the liver.  Pictures placed in media.    SPECIMENS:  uterus, cervix, bilateral tubes and ovaries     PROCEDURE IN DETAIL: Informed consent was obtained and the patient was taken to the Operating Suite.  General anesthesia was administered.  Once felt to be   adequate, she was placed in dorsal lithotomy position with her arms tucked.  The abdomen and pelvis were prepped and draped in the usual fashion and a speculum   was placed in the vagina.  The cervix was visualized, grasped with a single-tooth tenaculum and the uterus sounded to approximately 14 cm.  Serial dilation of cervix was performed and a large VCare manipulator was placed without difficulty.  Gurrola catheter was placed to gravity drainage and attention was turned to the abdominal portion of the procedure. A Veress needle was placed through the umbilicus an opening pressure was 1.   Pneumoperitoneum was obtained with carbon dioxide and once this was felt to be adequate, an 8 mm incision was made above the umbilicus and a robotic trocar was placed through this.  Intraperitoneal placement was confirmed with the camera.  Lateral robotic trocars x 3 were placed through 8 mm incisions.  A right upper quadrant 8 mm AirSeal accessory port was placed.  The patient was placed in steep Trendelenburg.  The robot was docked and instruments were passed in the operative field.  Findings as above were noted.  Pictures taken of liver and gallbladder and placed in media.   Adhesions  were taken down sharply with scissors.  Attention was first turned to the left side where the left round ligament was coagulated with bipolar cautery then ligated with monopolar scissors. The broad ligament was ligated and the left ureter identified.  The left utero-ovarian ligament was then coagulated with bipolar cautery and then ligated with monopolar scissors.   Attention was turned to the right side, which was taken down in an identical manner. Anteriorly, the vesicouterine peritoneum was incised and the bladder was mobilized inferiorly to below the V-care ring. Uterine vessels were coagulated and ligated with bipolar cautery.  Monopolar scissors were used to perform circumferential colpotomy at the V-care ring.  Specimen placed in an endobag.   The Endobag was placed through the vagina and the specimen debulked with a scalpel to remove the uterus and cervix  vaginally.  Attention was turned to the left adnexa.  Clear fluid drained from simple appearing cyst.  Left ovary was freed from the pelvic sidewall and the IP ligament coagulated and ligated.  Left tube and ovary removed through the vagina.  Attention was turned to the right adnexa.  Right ovary freed from the pelvic sidewall sharply and the IP ligament coagulated and ligated.  Right tube and ovary removed through the vagina.  The cuff was then closed with a 0 PDS suture  tied in the midline.  The pelvis was irrigated and noted to be hemostatic.  Hemoblast placed over surgical pedicles.  Attention was turned to Cystoscopy.  The branch catheter was removed.  Diagnostic cystoscopy was performed.  Bilateral ureteral efflux and an intact bladder were noted.  The cystoscope was removed and branch replaced.  The robot was undocked and the pneumoperitoneum was evacuated.  The patient was flattened.  All ports were removed and port sites were inspected and made hemostatic with electrocautery and closed with subcuticular 4-0 Monocryl suture.  Steri-Strips and pressure dressing were applied.  Patient not able to be extubated due to significant airway edema.  Transferred to PACU, intubated in stable condition.  Of note, I was present for and performed all key aspects of procedure.        Debora Parks's assistance was necessary as there was no other qualified resident available.

## 2022-08-12 LAB
ANION GAP SERPL CALC-SCNC: 10 MMOL/L (ref 8–16)
BASOPHILS # BLD AUTO: 0 K/UL (ref 0–0.2)
BASOPHILS NFR BLD: 0 % (ref 0–1.9)
BUN SERPL-MCNC: 18 MG/DL (ref 6–20)
CALCIUM SERPL-MCNC: 8.6 MG/DL (ref 8.7–10.5)
CHLORIDE SERPL-SCNC: 101 MMOL/L (ref 95–110)
CO2 SERPL-SCNC: 26 MMOL/L (ref 23–29)
CREAT SERPL-MCNC: 1.1 MG/DL (ref 0.5–1.4)
DIFFERENTIAL METHOD: ABNORMAL
EOSINOPHIL # BLD AUTO: 0 K/UL (ref 0–0.5)
EOSINOPHIL NFR BLD: 0 % (ref 0–8)
ERYTHROCYTE [DISTWIDTH] IN BLOOD BY AUTOMATED COUNT: 17.6 % (ref 11.5–14.5)
EST. GFR  (NO RACE VARIABLE): 58 ML/MIN/1.73 M^2
GLUCOSE SERPL-MCNC: 160 MG/DL (ref 70–110)
HCT VFR BLD AUTO: 29.7 % (ref 37–48.5)
HGB BLD-MCNC: 8.6 G/DL (ref 12–16)
IMM GRANULOCYTES # BLD AUTO: 0.04 K/UL (ref 0–0.04)
IMM GRANULOCYTES NFR BLD AUTO: 0.8 % (ref 0–0.5)
LYMPHOCYTES # BLD AUTO: 0.4 K/UL (ref 1–4.8)
LYMPHOCYTES NFR BLD: 8.8 % (ref 18–48)
MCH RBC QN AUTO: 24.6 PG (ref 27–31)
MCHC RBC AUTO-ENTMCNC: 29 G/DL (ref 32–36)
MCV RBC AUTO: 85 FL (ref 82–98)
MONOCYTES # BLD AUTO: 0.2 K/UL (ref 0.3–1)
MONOCYTES NFR BLD: 4.9 % (ref 4–15)
NEUTROPHILS # BLD AUTO: 4.2 K/UL (ref 1.8–7.7)
NEUTROPHILS NFR BLD: 85.5 % (ref 38–73)
NRBC BLD-RTO: 0 /100 WBC
PLATELET # BLD AUTO: 144 K/UL (ref 150–450)
PMV BLD AUTO: 10.6 FL (ref 9.2–12.9)
POTASSIUM SERPL-SCNC: 4.3 MMOL/L (ref 3.5–5.1)
RBC # BLD AUTO: 3.5 M/UL (ref 4–5.4)
SODIUM SERPL-SCNC: 137 MMOL/L (ref 136–145)
WBC # BLD AUTO: 4.88 K/UL (ref 3.9–12.7)

## 2022-08-12 PROCEDURE — 25000003 PHARM REV CODE 250: Performed by: STUDENT IN AN ORGANIZED HEALTH CARE EDUCATION/TRAINING PROGRAM

## 2022-08-12 PROCEDURE — 94003 VENT MGMT INPAT SUBQ DAY: CPT

## 2022-08-12 PROCEDURE — 99900035 HC TECH TIME PER 15 MIN (STAT)

## 2022-08-12 PROCEDURE — 63600175 PHARM REV CODE 636 W HCPCS: Performed by: STUDENT IN AN ORGANIZED HEALTH CARE EDUCATION/TRAINING PROGRAM

## 2022-08-12 PROCEDURE — 21400001 HC TELEMETRY ROOM

## 2022-08-12 PROCEDURE — 94660 CPAP INITIATION&MGMT: CPT

## 2022-08-12 PROCEDURE — 94761 N-INVAS EAR/PLS OXIMETRY MLT: CPT

## 2022-08-12 PROCEDURE — 27000221 HC OXYGEN, UP TO 24 HOURS

## 2022-08-12 PROCEDURE — 80048 BASIC METABOLIC PNL TOTAL CA: CPT | Performed by: HOSPITALIST

## 2022-08-12 PROCEDURE — 27000190 HC CPAP FULL FACE MASK W/VALVE

## 2022-08-12 PROCEDURE — 99900026 HC AIRWAY MAINTENANCE (STAT)

## 2022-08-12 PROCEDURE — 85025 COMPLETE CBC W/AUTO DIFF WBC: CPT | Performed by: HOSPITALIST

## 2022-08-12 RX ORDER — LISINOPRIL 20 MG/1
40 TABLET ORAL DAILY
Status: DISCONTINUED | OUTPATIENT
Start: 2022-08-13 | End: 2022-08-12

## 2022-08-12 RX ORDER — NIFEDIPINE 30 MG/1
90 TABLET, EXTENDED RELEASE ORAL DAILY
Status: DISCONTINUED | OUTPATIENT
Start: 2022-08-13 | End: 2022-08-13 | Stop reason: HOSPADM

## 2022-08-12 RX ADMIN — METOPROLOL SUCCINATE 50 MG: 50 TABLET, EXTENDED RELEASE ORAL at 09:08

## 2022-08-12 RX ADMIN — DEXMEDETOMIDINE HYDROCHLORIDE 1 MCG/KG/HR: 4 INJECTION, SOLUTION INTRAVENOUS at 12:08

## 2022-08-12 RX ADMIN — HYDROMORPHONE HYDROCHLORIDE 0.5 MG: 1 INJECTION, SOLUTION INTRAMUSCULAR; INTRAVENOUS; SUBCUTANEOUS at 02:08

## 2022-08-12 RX ADMIN — ACETAMINOPHEN 1000 MG: 500 TABLET, FILM COATED ORAL at 11:08

## 2022-08-12 RX ADMIN — DEXMEDETOMIDINE HYDROCHLORIDE 0.7 MCG/KG/HR: 4 INJECTION, SOLUTION INTRAVENOUS at 07:08

## 2022-08-12 RX ADMIN — ALPRAZOLAM 0.5 MG: 0.5 TABLET ORAL at 06:08

## 2022-08-12 RX ADMIN — APIXABAN 5 MG: 2.5 TABLET, FILM COATED ORAL at 09:08

## 2022-08-12 RX ADMIN — FUROSEMIDE 40 MG: 10 INJECTION, SOLUTION INTRAMUSCULAR; INTRAVENOUS at 08:08

## 2022-08-12 RX ADMIN — FUROSEMIDE 40 MG: 10 INJECTION, SOLUTION INTRAMUSCULAR; INTRAVENOUS at 10:08

## 2022-08-12 RX ADMIN — DULOXETINE 120 MG: 30 CAPSULE, DELAYED RELEASE ORAL at 11:08

## 2022-08-12 RX ADMIN — OXYCODONE 10 MG: 5 TABLET ORAL at 09:08

## 2022-08-12 RX ADMIN — Medication 187.5 MCG/HR: at 06:08

## 2022-08-12 RX ADMIN — OXYCODONE 10 MG: 5 TABLET ORAL at 04:08

## 2022-08-12 RX ADMIN — METOPROLOL SUCCINATE 50 MG: 50 TABLET, EXTENDED RELEASE ORAL at 03:08

## 2022-08-12 RX ADMIN — PANTOPRAZOLE SODIUM 40 MG: 40 TABLET, DELAYED RELEASE ORAL at 11:08

## 2022-08-12 RX ADMIN — ACETAMINOPHEN 1000 MG: 500 TABLET, FILM COATED ORAL at 06:08

## 2022-08-12 RX ADMIN — METHYLPREDNISOLONE SODIUM SUCCINATE 20 MG: 40 INJECTION, POWDER, FOR SOLUTION INTRAMUSCULAR; INTRAVENOUS at 06:08

## 2022-08-12 RX ADMIN — APIXABAN 5 MG: 2.5 TABLET, FILM COATED ORAL at 11:08

## 2022-08-12 RX ADMIN — ATORVASTATIN CALCIUM 40 MG: 20 TABLET, FILM COATED ORAL at 11:08

## 2022-08-12 RX ADMIN — ACETAMINOPHEN 1000 MG: 500 TABLET, FILM COATED ORAL at 05:08

## 2022-08-12 RX ADMIN — DEXMEDETOMIDINE HYDROCHLORIDE 1.1 MCG/KG/HR: 4 INJECTION, SOLUTION INTRAVENOUS at 03:08

## 2022-08-12 RX ADMIN — OXYCODONE 5 MG: 5 TABLET ORAL at 06:08

## 2022-08-12 RX ADMIN — PROPAFENONE HYDROCHLORIDE 225 MG: 150 TABLET, FILM COATED ORAL at 03:08

## 2022-08-12 RX ADMIN — HYDROMORPHONE HYDROCHLORIDE 0.5 MG: 1 INJECTION, SOLUTION INTRAMUSCULAR; INTRAVENOUS; SUBCUTANEOUS at 12:08

## 2022-08-12 RX ADMIN — HYDRALAZINE HYDROCHLORIDE 10 MG: 20 INJECTION INTRAMUSCULAR; INTRAVENOUS at 03:08

## 2022-08-12 RX ADMIN — PROPAFENONE HYDROCHLORIDE 225 MG: 150 TABLET, FILM COATED ORAL at 09:08

## 2022-08-12 NOTE — PROGRESS NOTES
"[facility] - Intensive Care ([place])  Gynecology   Progress Note    Patient Name: Vicky Alvarado  MRN: [id number]  Admission Date: 8/9/2022  Primary Care Provider: Gordy Coredro MD  Principal Problem: S/P robot-assisted surgical procedure    Subjective:     Interval History: POD#3 s/p RA-TLH/BSO/Cystoscopy.   Patient unable to be extubated postop due to airway edema and decision made to transfer to the ICU for higher level of care to remained intubated and sedated until safe to extubate.     Patient doing well this morning, history is provided by her goddaughter and nurse at bedside and patient who remains intubated and mildly sedated. When asked if she is having pain, pt nods her head "yes" but per her nurse at bedside she is about to receive pain medication. When I ask patient if her pain is severe she nods her head "no". She is not combative, but does move around in the bed some to try and get comfortable, she remains with soft restraints in place.She remains mildly sedated (fentanyl, precedex) but is easily arousable. Gurrola catheter remains in place.       Scheduled Meds:   acetaminophen  1,000 mg Oral Q6H    apixaban  5 mg Oral BID    atorvastatin  40 mg Oral Daily    DULoxetine  120 mg Oral Daily    furosemide (LASIX) injection  40 mg Intravenous BID    metoprolol succinate  50 mg Oral BID    pantoprazole  40 mg Oral Daily    propafenone  225 mg Oral Q8H    senna-docusate 8.6-50 mg  1 tablet Oral BID     Continuous Infusions:   dexmedetomidine (PRECEDEX) infusion 1.1 mcg/kg/hr (08/12/22 0342)    fentanyl 200 mcg/hr (08/12/22 0214)    propofoL Stopped (08/11/22 1054)     PRN Meds:sodium chloride, acetaminophen, albuterol-ipratropium, ALPRAZolam, calcium carbonate, fentaNYL, hydrALAZINE, HYDROmorphone, ketorolac, magnesium hydroxide 400 mg/5 ml, melatonin, midazolam, naloxone, ondansetron, ondansetron, opium-belladonna 16.2-60 mg, oxyCODONE, oxyCODONE, polyethylene glycol, prochlorperazine, " prochlorperazine, racepinephrine, simethicone, sodium chloride 0.9%, sodium chloride 0.9%, traZODone    Review of patient's allergies indicates:   Allergen Reactions    Flecainide Shortness Of Breath and Swelling    Vancomycin analogues Hives, Itching and Rash       Objective:     Vital Signs (Most Recent):  Temp: 98 °F (36.7 °C) (08/12/22 0300)  Pulse: (!) 56 (08/12/22 0436)  Resp: (!) 21 (08/12/22 0615)  BP: (!) 154/67 (08/12/22 0430)  SpO2: (!) 94 % (08/12/22 0436) Vital Signs (24h Range):  Temp:  [97.8 °F (36.6 °C)-99 °F (37.2 °C)] 98 °F (36.7 °C)  Pulse:  [51-64] 56  Resp:  [18-23] 21  SpO2:  [91 %-96 %] 94 %  BP: ()/() 154/67     Weight: 112.9 kg (248 lb 14.4 oz)  Body mass index is 41.42 kg/m².  Patient's last menstrual period was 03/25/2022 (exact date).    I&O (Last 24H):    Intake/Output Summary (Last 24 hours) at 8/12/2022 0625  Last data filed at 8/12/2022 0214  Gross per 24 hour   Intake 1107.01 ml   Output 1600 ml   Net -492.99 ml       Physical Exam:   Constitutional: Remains intubated w/ OG in place, appears comfortable lying in bed. Facial edema resolved.   Cardiovascular: Normal rate, regular rhythm, normal heart sounds   Pulmonary/Chest: Intubated with normal chest rise. She has no wheezes.      Abdominal: Incisions clean, dry, and intact, with steri-strips in place. Soft. Non-distended. Obese habitus. Nontender to palpation diffusely. Faint bowel sounds present throughout.  Genitourinary: No blood on chux pad, branch in place draining clear yellow urine   Neurological: Easily arousable and able to answer questions appropriately. Attempts to speak around the tube, able to mouth simple words and gesture.  Psychiatric: Noncombative.  Extremities: AL hose in place, SCDs not on currently     Laboratory:  CBC:   Recent Labs   Lab 08/12/22 0419   WBC 4.88   RBC 3.50*   HGB 8.6*   HCT 29.7*   *   MCV 85   MCH 24.6*   MCHC 29.0*     CMP:   Recent Labs   Lab 08/12/22 0419   *    CALCIUM 8.6*      K 4.3   CO2 26      BUN 18   CREATININE 1.1       Assessment/Plan:     Active Diagnoses:    Diagnosis Date Noted POA    PRINCIPAL PROBLEM:  s/p RA-TLH/BSO [Z98.890] 08/09/2022 Not Applicable    Acute hypoxemic respiratory failure [J96.01]  Yes    Laryngeal edema [J38.4]  Yes    Longstanding persistent atrial fibrillation [I48.11]  Yes      Problems Resolved During this Admission:       Postoperative Airway Edema   - Immediately postop, anesthesia attempted extubation, however, they were unable to extubate due to suspected airway edema. Attempted extubation for a second time in PACU after administration of steroids/nebs, however, unable to safely extubate  - Overnight, patient remained intubated and sedated on Propofol   - Vent settings managed per Pulm/Crit, appreciate assistance with this complicated case   - Awaiting ENT consult to evaluate vocal cords. Per Pulm/Crit, plan for extubation today 8/12. Will follow up later this AM.      Routine Post-Op Care:  POD#3, remains intubated/seation in ICU as noted in problem above   - Continue IVF this AM  - Gurrola remains in place with now adequate UOP (1500cc/shift)  - AM CBC significant for stable  H/H 8.6/29.7, Plt 144  - SCDs/AL hose and eliquis for DVT prophylaxis  - Continue scheduled colace  - Pain regimen: remains on fentanyl gtt per ICU for sedation, scheduled tylenol/dilaudid BTP, Transition to PO meds when patient extubated     Atrial Fibrillation with RVR   - Home metoprolol and propafenone continued   - Telemetry while inpatient, normal rhythm overnight   - Home eliquis continued     CHF  - Home metoprolol continued  - remains on Tele     Depression/Anxiety   - Home duloxetine continued   - midazolam PRN for anxiety per ICU   - trazodone PRN for insomnia when no longer sedated     GERD  - Scheduled protonix     HTN  - Home lisinopril, lasix, procardia held   - Hydralazine PRN for SBP >180       HLD   - home statin continued      MO   - BMI 45   - SCDs   - Home eliquis     ERENDIRA   - Currently intubated, plan for home CPAP when extubated     Abnormal Liver Appearance   - Abnormally appearing liver noted on abdominal entry during surgery, general surgery called for evaluation   - Suspect micronodular cirrhosis   - Will notify pt when more alert  - AST/ALT mildly elevated this AM 63/13  - Refer to GI/Hepatology outpatient    Disposition: Plan for extubation 8/12 per pulm/crit. When extubated will assess stability for transfer to floor and adjust orders to encourage advancement of postop milestones.    Jodee Nascimento MD  Gynecology   [facility] - Intensive Care ([place])

## 2022-08-12 NOTE — RESPIRATORY THERAPY
Patient extubated to Bipap 14/8 40% @0830. Tolerated extubation well and will continue to monitor.

## 2022-08-12 NOTE — PROGRESS NOTES
[facility] Pulmonary/Critical Care Progress Note      Patient: Vicky Alvarado  Age: 57 y.o.  MRN: [id number]  Admit date: 8/9/2022  LOS: 2 day(s)     SUBJECTIVE:       Extubated to BIPAP this morning. Remains on precedex, weaning as able.     OBJECTIVE DATA:      Vital Signs:  Vitals:    08/12/22 0615   BP:    Pulse:    Resp: (!) 21   Temp:      Intake/Output:    Intake/Output Summary (Last 24 hours) at 8/12/2022 0808  Last data filed at 8/12/2022 0638  Gross per 24 hour   Intake 1300.11 ml   Output 1275 ml   Net 25.11 ml     Physical Exam:  Constitutional: comfortable on BIPAP, obese  HEENT: NCAT, EOMI, MMM  Neck: Supple, normal ROM  Resp: CTABL, no wheeze  Cardio: RRR, S1 and S2 normal  GI: obese, soft, non-tender, bowel sounds active  Extremities: No edema, peripheral pulses 2+ and symmetric  Skin: No rashes, bruises, or lesions  Neurologic: oriented, following commands     Laboratory/Imaging:  Recent Labs   Lab 08/10/22  0337 08/10/22  0400 08/10/22  1254 08/10/22  1745 08/11/22  0353 08/11/22  0415 08/12/22  0419   WBC  --    < > 5.99  --  5.50  --  4.88   HGB  --    < > 8.0*  --  7.7*  --  8.6*   HCT  --    < > 27.8*   < > 27.1* 28* 29.7*   PLT  --    < > 153  --  147*  --  144*     --  138  --  138  --  137   K 5.1  --  4.3  --  3.8  --  4.3     --  101  --  102  --  101   CREATININE 1.1  --  1.1  --  1.2  --  1.1   BUN 10  --  12  --  15  --  18   CO2 17*  --  24  --  24  --  26   ALT 13  --   --   --   --   --   --    AST 63*  --   --   --   --   --   --     < > = values in this interval not displayed.     Microbiology:  None     Imaging:  None new, prior reviewed    Medications:  Scheduled:   acetaminophen  1,000 mg Oral Q6H    apixaban  5 mg Oral BID    atorvastatin  40 mg Oral Daily    DULoxetine  120 mg Oral Daily    furosemide (LASIX) injection  40 mg Intravenous BID    metoprolol succinate  50 mg Oral BID    pantoprazole  40 mg Oral Daily    propafenone  225 mg Oral Q8H     senna-docusate 8.6-50 mg  1 tablet Oral BID      Continuous:   dexmedetomidine (PRECEDEX) infusion 0.7 mcg/kg/hr (08/12/22 0758)      PRN:  sodium chloride, acetaminophen, albuterol-ipratropium, ALPRAZolam, calcium carbonate, fentaNYL, hydrALAZINE, HYDROmorphone, ketorolac, magnesium hydroxide 400 mg/5 ml, melatonin, midazolam, naloxone, ondansetron, ondansetron, opium-belladonna 16.2-60 mg, oxyCODONE, oxyCODONE, polyethylene glycol, prochlorperazine, prochlorperazine, racepinephrine, simethicone, sodium chloride 0.9%, sodium chloride 0.9%, traZODone    Assessment/Plan:     Neuro  - on precedex, weaning as able     Cardiovascular  Atrial Fibrillation  - continue home metoprolol and propafenone  - on eliquis for AC     HTN  - on lisinopril, nifedipine, and metoprolol at home  - holding in setting of normotension     Respiratory  Suspected Airway Edema  - extubated to BIPAP this am without complications     ERENDIRA  - noted on chart review, PSG noted  - has not had CPAP titration study  - CPAP nightly while inpatient      GI//FEN  S/p RA-TLH/BSO/Cystoscopy  - care per gynecology team     GERD  - continue home PPI     F: net positive   E: replete prn  N: NPO no BIPAP     Renal   Volume Status  - UOP 952cc in past 24h  - net positive 3.8L since admit  - CXR c/f some increased interstitial edema, last TTE 2021 without HF  - lasix BID     Heme  Normocytic Anemia  - hgb stable at 7.7, baseline appears to be between 8-9  - no signs of active bleeding  - transfusion goal >7     Endocrine  - BG goal 140s-180s  - not requiring insulin currently      Infectious Disease  - no active issues     Feeding: NPO  Analgesia: none  Sedation: precedex  Thrombo PPX: eliquis  Head of Bed: > 30 degrees  Ulcer PPX: protonix  Glucose: 110s without insulin  SBT/SAT: daily  Bowel Regimen: senna  Indwelling Lines: PIV x2, branch  Abx: none     Code: Full    Daisy Montgomery MD  [facility] Pulmonary/Critical Care Fellow  08/12/2022 8:05 AM

## 2022-08-13 VITALS
HEIGHT: 65 IN | RESPIRATION RATE: 18 BRPM | OXYGEN SATURATION: 95 % | TEMPERATURE: 99 F | DIASTOLIC BLOOD PRESSURE: 72 MMHG | HEART RATE: 75 BPM | BODY MASS INDEX: 46.98 KG/M2 | WEIGHT: 281.94 LBS | SYSTOLIC BLOOD PRESSURE: 165 MMHG

## 2022-08-13 PROCEDURE — 25000003 PHARM REV CODE 250: Performed by: STUDENT IN AN ORGANIZED HEALTH CARE EDUCATION/TRAINING PROGRAM

## 2022-08-13 PROCEDURE — 94660 CPAP INITIATION&MGMT: CPT

## 2022-08-13 PROCEDURE — 99900035 HC TECH TIME PER 15 MIN (STAT)

## 2022-08-13 PROCEDURE — 63600175 PHARM REV CODE 636 W HCPCS: Performed by: STUDENT IN AN ORGANIZED HEALTH CARE EDUCATION/TRAINING PROGRAM

## 2022-08-13 RX ORDER — IBUPROFEN 600 MG/1
600 TABLET ORAL EVERY 6 HOURS PRN
Qty: 30 TABLET | Refills: 1 | Status: SHIPPED | OUTPATIENT
Start: 2022-08-13 | End: 2023-08-29 | Stop reason: CLARIF

## 2022-08-13 RX ORDER — SENNOSIDES 8.6 MG/1
1 TABLET ORAL 2 TIMES DAILY
Qty: 30 TABLET | Refills: 3 | Status: SHIPPED | OUTPATIENT
Start: 2022-08-13

## 2022-08-13 RX ORDER — OXYCODONE AND ACETAMINOPHEN 5; 325 MG/1; MG/1
1 TABLET ORAL EVERY 4 HOURS PRN
Qty: 10 TABLET | Refills: 0 | Status: SHIPPED | OUTPATIENT
Start: 2022-08-13 | End: 2022-08-25

## 2022-08-13 RX ADMIN — OXYCODONE 10 MG: 5 TABLET ORAL at 02:08

## 2022-08-13 RX ADMIN — ACETAMINOPHEN 1000 MG: 500 TABLET, FILM COATED ORAL at 01:08

## 2022-08-13 RX ADMIN — ATORVASTATIN CALCIUM 40 MG: 20 TABLET, FILM COATED ORAL at 08:08

## 2022-08-13 RX ADMIN — FUROSEMIDE 40 MG: 10 INJECTION, SOLUTION INTRAMUSCULAR; INTRAVENOUS at 08:08

## 2022-08-13 RX ADMIN — METOPROLOL SUCCINATE 50 MG: 50 TABLET, EXTENDED RELEASE ORAL at 08:08

## 2022-08-13 RX ADMIN — PROPAFENONE HYDROCHLORIDE 225 MG: 150 TABLET, FILM COATED ORAL at 01:08

## 2022-08-13 RX ADMIN — APIXABAN 5 MG: 2.5 TABLET, FILM COATED ORAL at 08:08

## 2022-08-13 RX ADMIN — PANTOPRAZOLE SODIUM 40 MG: 40 TABLET, DELAYED RELEASE ORAL at 08:08

## 2022-08-13 RX ADMIN — NIFEDIPINE 90 MG: 30 TABLET, FILM COATED, EXTENDED RELEASE ORAL at 08:08

## 2022-08-13 RX ADMIN — OXYCODONE 10 MG: 5 TABLET ORAL at 10:08

## 2022-08-13 RX ADMIN — ALPRAZOLAM 0.5 MG: 0.5 TABLET ORAL at 02:08

## 2022-08-13 RX ADMIN — DULOXETINE 120 MG: 30 CAPSULE, DELAYED RELEASE ORAL at 08:08

## 2022-08-13 RX ADMIN — PROPAFENONE HYDROCHLORIDE 225 MG: 150 TABLET, FILM COATED ORAL at 06:08

## 2022-08-13 NOTE — PLAN OF CARE
Problem: Adult Inpatient Plan of Care  Goal: Plan of Care Review  8/13/2022 0638 by Gladys Allen RN  Outcome: Ongoing, Progressing  8/13/2022 0632 by Gladys Allen RN  Outcome: Ongoing, Progressing  Goal: Patient-Specific Goal (Individualized)  Outcome: Ongoing, Progressing  Goal: Absence of Hospital-Acquired Illness or Injury  8/13/2022 0638 by Gladys Allen RN  Outcome: Ongoing, Progressing  8/13/2022 0632 by Gladys Allen RN  Outcome: Ongoing, Progressing  Goal: Optimal Comfort and Wellbeing  8/13/2022 0638 by Gladys Allen RN  Outcome: Ongoing, Progressing  8/13/2022 0632 by Gladys Allen RN  Outcome: Ongoing, Progressing  Goal: Readiness for Transition of Care  Outcome: Ongoing, Progressing     Problem: Bariatric Environmental Safety  Goal: Safety Maintained with Care  8/13/2022 0638 by Gladys Allen RN  Outcome: Ongoing, Progressing  8/13/2022 0632 by Gladys Allen RN  Outcome: Ongoing, Progressing     Problem: Infection  Goal: Absence of Infection Signs and Symptoms  8/13/2022 0638 by Gladys Allen RN  Outcome: Ongoing, Progressing  8/13/2022 0632 by Gladys Allen RN  Outcome: Ongoing, Progressing     Problem: Skin Injury Risk Increased  Goal: Skin Health and Integrity  8/13/2022 0638 by Gladys Allen RN  Outcome: Ongoing, Progressing  8/13/2022 0632 by Gladys Allen RN  Outcome: Ongoing, Progressing     Problem: Fall Injury Risk  Goal: Absence of Fall and Fall-Related Injury  8/13/2022 0638 by Gladys Allen RN  Outcome: Ongoing, Progressing  8/13/2022 0632 by Gladys Allen RN  Outcome: Ongoing, Progressing     Problem: Pain Acute  Goal: Acceptable Pain Control and Functional Ability  Outcome: Ongoing, Progressing     Problem: Surgical Site Infection  Goal: Absence of Infection Signs and Symptoms  Outcome: Ongoing,  Progressing     Problem: Gas Exchange Impaired  Goal: Optimal Gas Exchange  Outcome: Ongoing, Progressing

## 2022-08-13 NOTE — PLAN OF CARE
Problem: Adult Inpatient Plan of Care  Goal: Plan of Care Review  Outcome: Met  Goal: Patient-Specific Goal (Individualized)  Outcome: Met  Goal: Absence of Hospital-Acquired Illness or Injury  Outcome: Met  Goal: Optimal Comfort and Wellbeing  Outcome: Met  Goal: Readiness for Transition of Care  Outcome: Met     Problem: Diabetes Comorbidity  Goal: Blood Glucose Level Within Targeted Range  Outcome: Met     Problem: Bariatric Environmental Safety  Goal: Safety Maintained with Care  Outcome: Met     Problem: Infection  Goal: Absence of Infection Signs and Symptoms  Outcome: Met     Problem: Skin Injury Risk Increased  Goal: Skin Health and Integrity  Outcome: Met     Problem: Fall Injury Risk  Goal: Absence of Fall and Fall-Related Injury  Outcome: Met     Problem: Pain Acute  Goal: Acceptable Pain Control and Functional Ability  Outcome: Met     Problem: Surgical Site Infection  Goal: Absence of Infection Signs and Symptoms  Outcome: Met     Problem: Gas Exchange Impaired  Goal: Optimal Gas Exchange  Outcome: Met   Adequate for discharge

## 2022-08-13 NOTE — NURSING
Clarified branch removal order with Dr. Gordon. Per Dr. Gordon active voiding trial is preferred but okay to remove branch and monitor pt urine output for 6 hours if that is pt. preference.  Pt notified and prefers to remove branch without active voiding trial. Pt acknowledges understanding that if she is unable to void with 6 hours; the branch will be replaced.

## 2022-08-13 NOTE — NURSING
Pt's PIV was discontinued, discharge and medication information was given, patient understood. Stated she already received her prescriptions. All questions answered and ready for transportation.

## 2022-08-13 NOTE — PROGRESS NOTES
[facility] Intensive Care [facility]  Gynecology   Progress Note    Patient Name: Vicky Alvarado  MRN: [id number]  Admission Date: 8/9/2022  Primary Care Provider: Gordy Cordero MD  Principal Problem: S/P robot-assisted surgical procedure    Subjective:     Interval History: POD#4 s/p RA-TLH/BSO/Cystoscopy.   Patient unable to be extubated initially postop due to airway edema and decision made to transfer to the ICU for higher level of care to remained intubated and sedated until safe to extubate.     Patient was able to be extubated on POD#3 and stepped down to the floor. Patient states that she has been ambulating regularly in the room. She has tolerated a regular diet. She has passed flatus and had multiple bowel movements. She has been urinating appropriately via branch catheter. Pain is well controlled. She states that she would really like to shower this morning.        Scheduled Meds:   acetaminophen  1,000 mg Oral Q6H    apixaban  5 mg Oral BID    atorvastatin  40 mg Oral Daily    DULoxetine  120 mg Oral Daily    furosemide (LASIX) injection  40 mg Intravenous BID    metoprolol succinate  50 mg Oral BID    NIFEdipine  90 mg Oral Daily    pantoprazole  40 mg Oral Daily    propafenone  225 mg Oral Q8H    senna-docusate 8.6-50 mg  1 tablet Oral BID     Continuous Infusions:    PRN Meds:sodium chloride, acetaminophen, albuterol-ipratropium, ALPRAZolam, calcium carbonate, hydrALAZINE, HYDROmorphone, magnesium hydroxide 400 mg/5 ml, melatonin, naloxone, ondansetron, ondansetron, opium-belladonna 16.2-60 mg, oxyCODONE, oxyCODONE, polyethylene glycol, prochlorperazine, prochlorperazine, racepinephrine, simethicone, sodium chloride 0.9%, sodium chloride 0.9%, traZODone    Review of patient's allergies indicates:   Allergen Reactions    Flecainide Shortness Of Breath and Swelling    Vancomycin analogues Hives, Itching and Rash       Objective:     Vital Signs (Most Recent):  Temp: 97.7 °F (36.5 °C)  (08/13/22 0320)  Pulse: 69 (08/13/22 0600)  Resp: 17 (08/13/22 0320)  BP: (!) 164/73 (08/13/22 0320)  SpO2: 99 % (08/13/22 0320) Vital Signs (24h Range):  Temp:  [97.7 °F (36.5 °C)-98.9 °F (37.2 °C)] 97.7 °F (36.5 °C)  Pulse:  [] 69  Resp:  [13-39] 17  SpO2:  [73 %-100 %] 99 %  BP: (132-181)/(60-95) 164/73     Weight: 127.9 kg (281 lb 15.5 oz)  Body mass index is 46.92 kg/m².  Patient's last menstrual period was 03/25/2022 (exact date).    I&O (Last 24H):    Intake/Output Summary (Last 24 hours) at 8/13/2022 0625  Last data filed at 8/13/2022 0618  Gross per 24 hour   Intake 2002.92 ml   Output 4250 ml   Net -2247.08 ml       Physical Exam:   Constitutional: Alert and oriented, in NAD  Cardiovascular: Normal rate,    Pulmonary/Chest: normal respiratory effort  Abdominal: Incisions clean, dry, and intact, with steri-strips in place. Soft. Non-distended. Obese habitus. Nontender to palpation diffusely.   Genitourinary: No blood on chux pad, branch in place draining clear yellow urine   Extremities: AL hose in place, SCDs not on currently     Laboratory:  CBC:   Recent Labs   Lab 08/12/22 0419   WBC 4.88   RBC 3.50*   HGB 8.6*   HCT 29.7*   *   MCV 85   MCH 24.6*   MCHC 29.0*     CMP:   Recent Labs   Lab 08/12/22 0419   *   CALCIUM 8.6*      K 4.3   CO2 26      BUN 18   CREATININE 1.1       Assessment/Plan:     Active Diagnoses:    Diagnosis Date Noted POA    PRINCIPAL PROBLEM:  s/p RA-TLH/BSO [Z98.890] 08/09/2022 Not Applicable    Acute hypoxemic respiratory failure [J96.01]  Yes    Laryngeal edema [J38.4]  Yes    Longstanding persistent atrial fibrillation [I48.11]  Yes      Problems Resolved During this Admission:       Postoperative Airway Edema   - Immediately postop, anesthesia attempted extubation, however, they were unable to extubate due to suspected airway edema. Attempted extubation for a second time in PACU after administration of steroids/nebs, however, unable to  safely extubate  - Patient was able to be extubated on POD#3 and is currently on 3L NC.     Routine Post-Op Care:   - Continue routine post-op advances  - UOP adequate. Gurrola catheter to be removed this morning  - Patient meeting all post-op milestones      Atrial Fibrillation with RVR   - Home metoprolol and propafenone continued   - Telemetry while inpatient, normal rhythm overnight   - Home eliquis continued     CHF  - Home metoprolol continued  - remains on Tele     Depression/Anxiety   - Home duloxetine continued   - midazolam PRN for anxiety per ICU   - trazodone PRN for insomnia when no longer sedated     GERD  - Scheduled protonix     HTN  - Home lisinopril, lasix, procardia held   - Hydralazine PRN for SBP >180       HLD   - home statin continued     MO   - BMI 45   - SCDs   - Home eliquis     ERENDIRA   - CPAP from home in room    Abnormal Liver Appearance   - Abnormally appearing liver noted on abdominal entry during surgery, general surgery called for evaluation   - Suspect micronodular cirrhosis   - Will notify pt when more alert  - AST/ALT mildly elevated at  63/13  - Refer to GI/Hepatology outpatient    Disposition: Plan for dc home today    Lary Gordon MD  Gynecology   [facility] - Intensive Care ([place])

## 2022-08-13 NOTE — DISCHARGE SUMMARY
Discharge Summary  Gynecology      Admit Date: 8/9/2022    Discharge Date and Time: 8/13/2022 3:18 PM    Attending Physician: Yolanda Barriga MD    Principal Diagnoses: S/P robot-assisted surgical procedure    Active Hospital Problems    Diagnosis  POA    *s/p RA-TLH/BSO [Z98.890]  Not Applicable    Acute hypoxemic respiratory failure [J96.01]  Yes    Laryngeal edema [J38.4]  Yes    Longstanding persistent atrial fibrillation [I48.11]  Yes      Resolved Hospital Problems   No resolved problems to display.       Procedures: Procedure(s) (LRB):  XI ROBOTIC HYSTERECTOMY (N/A)  CYSTOSCOPY (N/A)  ROBOTIC SALPINGO-OOPHORECTOMY (Bilateral)    Discharged Condition: good    Hospital Course:   Vicky Alvarado is a 57 y.o. y.o. No obstetric history on file. female who presented on 8/9/2022 for above procedures for the treatment of postmenopausal bleeding. Procedure was complicated by inability to extubate. Patient was intubated in ICU until POD#3 when she was then able to be extubated. She was transitioned to BIPAP, then stepped down to the floor. Remainging post-operative course was uncomplicated.  On day of discharge, patient was urinating, ambulating, and tolerating a regular diet without difficulty. Pain was well controlled on PO medication. She was discharged home on POD#4 in stable condition with instructions to follow up with Dr. Barriga in 4 weeks.     Consults: None    Significant Diagnostic Studies:  Recent Labs   Lab 08/10/22  1254 08/10/22  1745 08/11/22  0353 08/11/22  0415 08/12/22  0419   WBC 5.99  --  5.50  --  4.88   HGB 8.0*  --  7.7*  --  8.6*   HCT 27.8*   < > 27.1* 28* 29.7*   MCV 86  --  87  --  85     --  147*  --  144*    < > = values in this interval not displayed.        Treatments:   1. Surgery as above    Disposition: Home or Self Care    Patient Instructions:   Current Discharge Medication List      START taking these medications    Details   ibuprofen (ADVIL,MOTRIN) 600 MG tablet  Take 1 tablet (600 mg total) by mouth every 6 (six) hours as needed for Pain.  Qty: 30 tablet, Refills: 1      oxyCODONE-acetaminophen (PERCOCET) 5-325 mg per tablet Take 1 tablet by mouth every 4 (four) hours as needed for Pain.  Qty: 10 tablet, Refills: 0    Comments: Quantity prescribed more than 7 day supply? No      senna (SENNA) 8.6 mg tablet Take 1 tablet by mouth 2 (two) times daily.  Qty: 30 tablet, Refills: 3         CONTINUE these medications which have NOT CHANGED    Details   acetaminophen (TYLENOL) 500 MG tablet Take 2 tablets (1,000 mg) by mouth at onset of pain or headache, may repeat if needed.      ALPRAZolam (XANAX) 0.5 MG tablet Take 1 tablet (0.5 mg total) by mouth 2 (two) times daily as needed for Anxiety.  Qty: 60 tablet, Refills: 4    Associated Diagnoses: Anxiety      atorvastatin (LIPITOR) 40 MG tablet Take 1 tablet (40 mg total) by mouth once daily.  Qty: 90 tablet, Refills: 3    Associated Diagnoses: Type 2 diabetes mellitus with diabetic polyneuropathy, without long-term current use of insulin      b complex vitamins capsule Take 1 capsule by mouth once daily.      colchicine (COLCRYS) 0.6 mg tablet For gout attack: Take TWO tablets by mouth, then, 2 hours later, take ONE additional tablet. Then take 1 tablet twice daily only if still needed for gout pain.  Qty: 20 tablet, Refills: 1    Associated Diagnoses: Gout of big toe      DULoxetine (CYMBALTA) 60 MG capsule Take 2 capsules (120 mg total) by mouth once daily.  Qty: 180 capsule, Refills: 3    Associated Diagnoses: Depression, unspecified depression type      ferrous sulfate (SLOW RELEASE IRON) 142 mg (45 mg iron) TbSR Take 1 tablet (142 mg total) by mouth once daily. FOR 7 DAY THEN INCREASE TO TWICE DAILY AS TOLERATED      furosemide (LASIX) 40 MG tablet Take 1 tablet (40 mg total) by mouth 2 (two) times daily. Resume as needed for edema until followup with your PCP.  Qty: 60 tablet, Refills: 11    Associated Diagnoses: Essential  hypertension      gluc-shikha-[facility]#8-O-weap-reymundo-bor 750 mg-644 mg- 30 mg-1 mg Tab Take 1 tablet by mouth once daily.       krill/om-3/dha/epa/phospho/ast (MEGARED OMEGA-3 KRILL OIL ORAL) Take 1 capsule by mouth once daily.      lisinopriL (PRINIVIL,ZESTRIL) 40 MG tablet Take 1 tablet (40 mg total) by mouth once daily.  Qty: 90 tablet, Refills: 3    Comments: .  Associated Diagnoses: Essential hypertension      metoprolol succinate (TOPROL-XL) 50 MG 24 hr tablet Take 1 tablet (50 mg total) by mouth 2 (two) times daily.  Qty: 180 tablet, Refills: 3    Comments: .  Associated Diagnoses: Atrial fibrillation, unspecified type      multivitamin (THERAGRAN) per tablet Take 1 tablet by mouth once daily.      NIFEdipine (PROCARDIA-XL) 90 MG (OSM) 24 hr tablet Take 1 tablet (90 mg total) by mouth once daily. HOLD UNTIL FOLLOW-UP WITH YOUR PCP.  Qty: 30 tablet, Refills: 11    Comments: .  Associated Diagnoses: Essential hypertension      omeprazole (PRILOSEC) 20 MG capsule TAKE 1 CAPSULE BY MOUTH ONCE DAILY  Qty: 90 capsule, Refills: 3      potassium chloride SA (K-DUR,KLOR-CON) 20 MEQ tablet Take 1 tablet (20 mEq total) by mouth once daily. ONLY TAKE WITH LASIX.  Qty: 30 tablet, Refills: 11    Associated Diagnoses: Essential hypertension      predniSONE (DELTASONE) 10 MG tablet Take 1 tablet by mouth daily  Qty: 10 tablet, Refills: 0    Associated Diagnoses: Acute gout of foot, unspecified cause, unspecified laterality      propafenone (RYTHMOL) 225 MG Tab Take 1 tablet (225 mg total) by mouth every 8 (eight) hours.  Qty: 90 tablet, Refills: 11    Associated Diagnoses: PAF (paroxysmal atrial fibrillation)      traZODone (DESYREL) 100 MG tablet Take 1 tablet (100 mg total) by mouth nightly as needed for Insomnia.  Qty: 90 tablet, Refills: 3    Associated Diagnoses: Depression, unspecified depression type      apixaban (ELIQUIS) 5 mg Tab Take 1 tablet (5 mg total) by mouth 2 (two) times daily.  Qty: 90 tablet, Refills: 5       melatonin 10 mg Tab Take 1-2 tablets by mouth nightly as needed (Sleep).         STOP taking these medications       medroxyPROGESTERone (PROVERA) 10 MG tablet Comments:   Reason for Stopping:         pantoprazole (PROTONIX) 40 MG tablet Comments:   Reason for Stopping:               Discharge Procedure Orders   Diet general     Lifting restrictions   Order Comments: No lifting greater than 15 pounds for six weeks.     Other restrictions (specify):   Order Comments: PELVIC REST:  No douching, tampons, or intercourse for 6 weeks.    If prescribed, vaginal estrogen cream may be used during the postoperative period.     DRIVING:  No driving while on narcotics. Driving may be resumed initially with a competent passenger one to two weeks after surgery if no longer taking narcotics.     EXERCISE:  For six weeks your exercise should be limited to walking. You may walk as far as you wish, as long as you increase your level of exertion gradually and avoid slippery surfaces. You may climb stairs as needed to get around, but should not use stair climbing for exercise.     Remove dressing in 24 hours   Order Comments: If you have a bandage on wound, you may remove it the day after dismissal.  If you had steri-strips remove them once they begin to peel off (usually 2 weeks). Keep incision clean and dry.  Inspect the incision daily for signs and symptoms of infection.     Wound care routine (specify)   Order Comments: WOUND CARE:  If you have a band-aid or bandage on your wound, you may remove it the day after dismissal.  You may wash the wound with mild soap and water.   You may shower at any time but should avoid immersing any abdominal incisions in water for at least two weeks after surgery or until the wound is completely healed. If given, please shower with Hibiclens soap until bottle is completely finished. Keep your wound clean and dry.  You should observe your incision for signs of infection which include redness,  warmth, drainage or fever.     Call MD for:  temperature >100.4     Call MD for:  persistent nausea and vomiting     Call MD for:  severe uncontrolled pain     Call MD for:  difficulty breathing, headache or visual disturbances     Call MD for:  redness, tenderness, or signs of infection (pain, swelling, redness, odor or green/yellow discharge around incision site)     Call MD for:  hives     Call MD for:   Order Comments: inability to void,urine is ketchup colored or you have large clots, vaginal bleeding is heavier than a period.    VAGINAL DISCHARGE: You may develop a vaginal discharge and intermittent vaginal spotting after surgery and up to 6 weeks postoperatively.  The discharge may have an odor and may change in color but it is normal.  This is due to dissolving stiches.  Contact your surgical team if you develop vaginal or vulvar irritation along with a discharge.  Also contact your surgical team if you have vaginal discharge that smells like urine or stool.    CONSTIPATION REMEDIES: Patients are often constipated after surgery or with use of oral narcotic medicine. You should continue to take the stool softener, Senokot-S during the next six weeks, and consume adequate amounts of water.  If you have not had a bowel movement for 3 days after dismissal, or are uncomfortable and unable to pass stool, please try one or all of the following measures:  1.  Milk of Magnesia - 30 cc by mouth every 12 hours   2.  Dulcolax suppository - One suppository per rectum every 4-6 hours   3.  Metamucil, Fibercon or other bulk former - use as directed  4.  Fleets Enema        PAIN MEDICATIONS:     Take your pain medications as instructed. It is best to take pain medications before your pain becomes severe. This will allow you to take less medication yet have better pain relief. For the first 2 or 3 days it may be helpful to take your pain medications on a regular schedule (e.g. every 4 to 6 hours). This will help you to keep  your pain under better control. You should then begin to take fewer medications each day until you no longer need them. Do not take pain medication on an empty stomach. This may lead to nausea and vomiting.     Activity as tolerated   Order Comments: Return to normal activity slowly as you feel able.  For 6 weeks your exercise should be limited to walking.  You may walk as far as you wish, as long as you increase your level of exertion gradually and avoid slippery surfaces.    If you had a hysterectomy at the surgery do not insert anything in your vagina for 9 weeks.     Shower on day dressing removed (No bath)   Order Comments: You may shower at any time but should avoid immersing any abdominal incisions in water for at least 2 weeks after surgery or until the wound is completely healed.  If given, please shower with Hibaclens soap until bottle is completely finish        Follow-up Information     Yolanda Barriga MD. Schedule an appointment as soon as possible for a visit in 4 week(s).    Specialty: Obstetrics and Gynecology  Why: Postoperative visit  Contact information:  [address] [facility] [address]  [phone]                       Sarika Jordan MD/MPH  OB/GYN PGY3

## 2022-08-15 ENCOUNTER — PATIENT OUTREACH (OUTPATIENT)
Dept: ADMINISTRATIVE | Facility: CLINIC | Age: 57
End: 2022-08-15
Payer: COMMERCIAL

## 2022-08-15 LAB
FINAL PATHOLOGIC DIAGNOSIS: NORMAL
GROSS: NORMAL
Lab: NORMAL

## 2022-08-16 ENCOUNTER — TELEPHONE (OUTPATIENT)
Dept: OBSTETRICS AND GYNECOLOGY | Facility: CLINIC | Age: 57
End: 2022-08-16
Payer: COMMERCIAL

## 2022-08-16 RX ORDER — OXYCODONE HYDROCHLORIDE 5 MG/1
5 CAPSULE ORAL EVERY 4 HOURS PRN
Qty: 10 CAPSULE | Refills: 0 | Status: SHIPPED | OUTPATIENT
Start: 2022-08-16 | End: 2022-08-25

## 2022-08-16 NOTE — TELEPHONE ENCOUNTER
Spoke with pt    Pt reports recovering at home following surgery.  Pt is passing gas, has had frequent BM's starting in the hospital and has continued today, and does not reports constipation.    Pt was at a 9/10 pain level yesterday especially when sitting or standing. Pt was nauseated from pain, but is doing better today. Pt reports 7/10 today  Pt is urinating, but does report some leakage yesterday, but that has resolved today.  Denies any fever, vomiting, bleeding, discharge, or sign of infection.    Per Dr Barriga:  Pt will have to go for Urine Culture if any more urinary issues arise.    Dr Barriga sending in more pain medication as pt only had 10 sent with her home.  Pt is to use pain medication only when needed, but to manage pain she is alternate Motrin and Tylenol every 4 hours.    Pt scheduled for Post op in 2 weeks        Pt verbalized understanding.

## 2022-08-16 NOTE — TELEPHONE ENCOUNTER
----- Message from Karina Bah sent at 8/16/2022 12:25 PM CDT -----  Regarding: return call  Who Called:SHAWNA GRAHAM [[id number]]        Who Left Message for Patient:        Does the patient know what this is regarding? Yes         Best Call Back Number:[phone]        Additional Information:

## 2022-08-25 ENCOUNTER — OFFICE VISIT (OUTPATIENT)
Dept: OBSTETRICS AND GYNECOLOGY | Facility: CLINIC | Age: 57
End: 2022-08-25
Attending: OBSTETRICS & GYNECOLOGY
Payer: COMMERCIAL

## 2022-08-25 VITALS
WEIGHT: 268.5 LBS | DIASTOLIC BLOOD PRESSURE: 66 MMHG | SYSTOLIC BLOOD PRESSURE: 108 MMHG | BODY MASS INDEX: 43.15 KG/M2 | HEIGHT: 66 IN

## 2022-08-25 DIAGNOSIS — Z98.890 POST-OPERATIVE STATE: Primary | ICD-10-CM

## 2022-08-25 PROCEDURE — 99024 POSTOP FOLLOW-UP VISIT: CPT | Mod: S$GLB,,, | Performed by: OBSTETRICS & GYNECOLOGY

## 2022-08-25 PROCEDURE — 3044F HG A1C LEVEL LT 7.0%: CPT | Mod: CPTII,S$GLB,, | Performed by: OBSTETRICS & GYNECOLOGY

## 2022-08-25 PROCEDURE — 1159F MED LIST DOCD IN RCRD: CPT | Mod: CPTII,S$GLB,, | Performed by: OBSTETRICS & GYNECOLOGY

## 2022-08-25 PROCEDURE — 3078F DIAST BP <80 MM HG: CPT | Mod: CPTII,S$GLB,, | Performed by: OBSTETRICS & GYNECOLOGY

## 2022-08-25 PROCEDURE — 99024 PR POST-OP FOLLOW-UP VISIT: ICD-10-PCS | Mod: S$GLB,,, | Performed by: OBSTETRICS & GYNECOLOGY

## 2022-08-25 PROCEDURE — 99999 PR PBB SHADOW E&M-EST. PATIENT-LVL IV: ICD-10-PCS | Mod: PBBFAC,,, | Performed by: OBSTETRICS & GYNECOLOGY

## 2022-08-25 PROCEDURE — 1159F PR MEDICATION LIST DOCUMENTED IN MEDICAL RECORD: ICD-10-PCS | Mod: CPTII,S$GLB,, | Performed by: OBSTETRICS & GYNECOLOGY

## 2022-08-25 PROCEDURE — 3074F PR MOST RECENT SYSTOLIC BLOOD PRESSURE < 130 MM HG: ICD-10-PCS | Mod: CPTII,S$GLB,, | Performed by: OBSTETRICS & GYNECOLOGY

## 2022-08-25 PROCEDURE — 1160F RVW MEDS BY RX/DR IN RCRD: CPT | Mod: CPTII,S$GLB,, | Performed by: OBSTETRICS & GYNECOLOGY

## 2022-08-25 PROCEDURE — 3078F PR MOST RECENT DIASTOLIC BLOOD PRESSURE < 80 MM HG: ICD-10-PCS | Mod: CPTII,S$GLB,, | Performed by: OBSTETRICS & GYNECOLOGY

## 2022-08-25 PROCEDURE — 3008F BODY MASS INDEX DOCD: CPT | Mod: CPTII,S$GLB,, | Performed by: OBSTETRICS & GYNECOLOGY

## 2022-08-25 PROCEDURE — 4010F PR ACE/ARB THEARPY RXD/TAKEN: ICD-10-PCS | Mod: CPTII,S$GLB,, | Performed by: OBSTETRICS & GYNECOLOGY

## 2022-08-25 PROCEDURE — 1160F PR REVIEW ALL MEDS BY PRESCRIBER/CLIN PHARMACIST DOCUMENTED: ICD-10-PCS | Mod: CPTII,S$GLB,, | Performed by: OBSTETRICS & GYNECOLOGY

## 2022-08-25 PROCEDURE — 3074F SYST BP LT 130 MM HG: CPT | Mod: CPTII,S$GLB,, | Performed by: OBSTETRICS & GYNECOLOGY

## 2022-08-25 PROCEDURE — 3044F PR MOST RECENT HEMOGLOBIN A1C LEVEL <7.0%: ICD-10-PCS | Mod: CPTII,S$GLB,, | Performed by: OBSTETRICS & GYNECOLOGY

## 2022-08-25 PROCEDURE — 3008F PR BODY MASS INDEX (BMI) DOCUMENTED: ICD-10-PCS | Mod: CPTII,S$GLB,, | Performed by: OBSTETRICS & GYNECOLOGY

## 2022-08-25 PROCEDURE — 99999 PR PBB SHADOW E&M-EST. PATIENT-LVL IV: CPT | Mod: PBBFAC,,, | Performed by: OBSTETRICS & GYNECOLOGY

## 2022-08-25 PROCEDURE — 4010F ACE/ARB THERAPY RXD/TAKEN: CPT | Mod: CPTII,S$GLB,, | Performed by: OBSTETRICS & GYNECOLOGY

## 2022-08-25 NOTE — PROGRESS NOTES
CC: Postop visit    Vicky Alvarado is a 57 y.o. female  post-op from a [facility]/BSO on 22.      Patient is doing well overall.  Pain much better.  No bowel or bladder complaints.  No fever.    Had spotting until last night - had 2 episodes of heavy vaginal bleeding with clots.  She held her Eliquis this morning    Pathology: benign    Patient noted to have multinodular liver on LSC evaluation of upper abdomen at time of hysterectomy.    Past Medical History:   Diagnosis Date    Anticoagulant long-term use     Arthritis     Atrial fibrillation     cardioversion    Atrial fibrillation with RVR     Avascular necrosis     L hand    CHF (congestive heart failure)     Depression     Encounter for blood transfusion 2020    Encounter for blood transfusion 2022    GERD (gastroesophageal reflux disease)     Hx of psychiatric care     Hypertension     Iron deficiency anemia 2022    TIBC 444 with saturated iron 8    Obese     Pre-diabetes 2022    Psychiatric problem     Sleep apnea     wears cpap     Past Surgical History:   Procedure Laterality Date    ABLATION OF ARRHYTHMOGENIC FOCUS FOR ATRIAL FIBRILLATION N/A 2020    Procedure: Ablation atrial fibrillation;  Surgeon: Gabriel Hawley MD;  Location: [facility] EP LAB;  Service: Cardiology;  Laterality: N/A;  afib, PVI, RFA, REENA, SHADY, anes, MB, 3 Prep    ABLATION OF ARRHYTHMOGENIC FOCUS FOR ATRIAL FIBRILLATION N/A 2022    Procedure: Ablation atrial fibrillation;  Surgeon: Gabriel Hawley MD;  Location: [facility] EP LAB;  Service: Cardiology;  Laterality: N/A;  afib/afl, PVI (re-do)/CTI, RFA, REENA (cx if SR), SHADY, anes, MB, 3 Prep    APPLICATION OF WOUND VACUUM-ASSISTED CLOSURE DEVICE Left 8/3/2020    Procedure: APPLICATION, WOUND VAC;  Surgeon: SEMAJ Márquez III, MD;  Location: [facility] OR [address] [place] [address];  Service: Peripheral Vascular;  Laterality: Left;    APPLICATION OF WOUND VACUUM-ASSISTED CLOSURE DEVICE Left 2020     Procedure: APPLICATION, WOUND VAC;  Surgeon: SEMAJ Márquez III, MD;  Location: [facility] OR 2ND FLR;  Service: Peripheral Vascular;  Laterality: Left;    CARPAL TUNNEL RELEASE Right 6/10/2020    Procedure: RELEASE, CARPAL TUNNEL - RIGHT;  Surgeon: Adelaida Hall MD;  Location: [facility] OR;  Service: Orthopedics;  Laterality: Right;  GENERAL AND REGIONAL    CARPAL TUNNEL RELEASE Left 5/5/2021    Procedure: RELEASE, CARPAL TUNNEL, LEFT;  Surgeon: Adelaida Hall MD;  Location: [facility] OR;  Service: Orthopedics;  Laterality: Left;  GENERAL & REGIONAL IN PACU    CLOSURE OF WOUND Left 8/6/2020    Procedure: CLOSURE, WOUND;  Surgeon: SEMAJ Márquez III, MD;  [facility];  Service: Peripheral Vascular;  Laterality: Left;  Complex    COLONOSCOPY N/A 8/17/2021    Procedure: COLONOSCOPY Suprep;  Surgeon: oLco Deluca MD;  Location: [facility];  Service: Endoscopy;  Laterality: N/A;    ESOPHAGOGASTRODUODENOSCOPY N/A 8/17/2021    Procedure: EGD (ESOPHAGOGASTRODUODENOSCOPY);  Surgeon: Loco Deluca MD;  Location: [facility];  Service: Endoscopy;  Laterality: N/A;  Patient is schedule to have her Covid test on 08/14/2021 at the [facility] @ 9:30am. [place].    EXPLORATION OF FEMORAL ARTERY Left 7/21/2020    Procedure: EXPLORATION, ARTERY, FEMORAL;  Surgeon: SEMAJ Márquez III, MD;  [facility];  Service: Peripheral Vascular;  Laterality: Left;  1. Urgent Direct repair, L SFA branch laceration    FOOT SURGERY      HYSTEROSCOPY WITH DILATION AND CURETTAGE OF UTERUS N/A 2/19/2022    Procedure: HYSTEROSCOPY, WITH DILATION AND CURETTAGE OF UTERUS;  Surgeon: Shane Palomo MD;  [facility];  Service: OB/GYN;  Laterality: N/A;    INCISION AND DRAINAGE OF KNEE Left 5/12/2022    Procedure: INCISION AND DRAINAGE, Prepatellar bursectomy.;  Surgeon: Dre Guzmán MD;  [facility];  Service: Orthopedics;  Laterality: Left;    ROBOT-ASSISTED  "LAPAROSCOPIC ABDOMINAL HYSTERECTOMY USING DA KEIRY XI N/A 2022    Procedure: XI ROBOTIC HYSTERECTOMY;  Surgeon: Yolanda Barriga MD;  Location: [facility];  Service: OB/GYN;  Laterality: N/A;  dual console requested    ROBOT-ASSISTED LAPAROSCOPIC SALPINGO-OOPHORECTOMY Bilateral 2022    Procedure: ROBOTIC SALPINGO-OOPHORECTOMY;  Surgeon: Yolanda Barriga MD;  Location: [facility] [place] OR;  Service: OB/GYN;  Laterality: Bilateral;    TREATMENT OF CARDIAC ARRHYTHMIA N/A 2020    Procedure: CARDIOVERSION;  Surgeon: Gabriel Hawley MD;  Location: [facility] EP LAB;  Service: Cardiology;  Laterality: N/A;  af, demi, dccv, anes, mb, 345    TREATMENT OF CARDIAC ARRHYTHMIA  2022    Procedure: Cardioversion or Defibrillation;  Surgeon: Gabriel Hawley MD;  Location: [facility] EP LAB;  Service: Cardiology;;    WOUND DEBRIDEMENT Left 2020    Procedure: DEBRIDEMENT, WOUND;  Surgeon: SEMAJ Márquez III, MD;  [address]  Service: Peripheral Vascular;  Laterality: Left;     Family History   Problem Relation Age of Onset    Cataracts Mother     Hypertension Mother     Thyroid disease Mother     Diabetes Father     Hypertension Father     Hypertension Sister     Hypertension Brother     Amblyopia Neg Hx     Blindness Neg Hx     Cancer Neg Hx     Glaucoma Neg Hx     Macular degeneration Neg Hx     Retinal detachment Neg Hx     Strabismus Neg Hx     Stroke Neg Hx     Breast cancer Neg Hx     Colon cancer Neg Hx     Ovarian cancer Neg Hx      Social History     Tobacco Use    Smoking status: Former Smoker     Types: Cigarettes     Quit date: 2019     Years since quitting: 3.1    Smokeless tobacco: Never Used   Substance Use Topics    Alcohol use: No    Drug use: No     OB History    Para Term  AB Living   0 0 0 0 0 0   SAB IAB Ectopic Multiple Live Births   0 0 0 0 0       /66 (BP Location: Left arm, Patient Position: Sitting, BP Method: Large (Manual))   Ht 5' 6" " (1.676 m)   Wt 121.8 kg (268 lb 8.3 oz)   LMP 03/25/2022 (Exact Date)   BMI 43.34 kg/m²     ROS:  GENERAL: No fever, chills, fatigability or weight loss.  VULVAR: No pain, no lesions and no itching.  VAGINAL: No relaxation, no itching, no discharge, no abnormal bleeding and no lesions.  ABDOMEN: No abdominal pain. Denies nausea. Denies vomiting. No diarrhea. No constipation  BREAST: Denies pain. No lumps. No discharge.  URINARY: No incontinence, no nocturia, no frequency and no dysuria.  CARDIOVASCULAR: No chest pain. No shortness of breath. No leg cramps.  NEUROLOGICAL: No headaches. No vision changes.    PE:   GEN:  NAD, A&O x 3  ABDOMEN:  Soft, non-tender, non-distended.  Incisions healing well without evidence of infection  PELVIC: Normal external genitalia without lesions.  Normal hair distribution.  Adequate perineal body, normal urethral meatus.  Vagina moist and well rugated without lesions or discharge.  Cuff intact with stable clot.  No active bleeding.  Appropriately tender      ICD-10-CM ICD-9-CM    1. Post-operative state  Z98.890 V45.89          Patient doing well overall.  Hold eliquis until tomorrow evening  Encouraged use of compression stockings  Bleeding precautions  Follow-up 4 weeks or prn

## 2022-09-15 ENCOUNTER — TELEPHONE (OUTPATIENT)
Dept: OBSTETRICS AND GYNECOLOGY | Facility: CLINIC | Age: 57
End: 2022-09-15
Payer: COMMERCIAL

## 2022-09-15 NOTE — TELEPHONE ENCOUNTER
----- Message from Nichole Chung sent at 9/15/2022  9:21 AM CDT -----  Pt called to inform the nurse Ambar that she over slept for he post-op appointment, and r/s for 10/31/22. The would like a call if she can be seen sooner.    The pt can be reached at [phone]

## 2022-09-15 NOTE — TELEPHONE ENCOUNTER
LVM:  Good Morning Ms Alvarado, this is Ambar from Dr Barriga office returning your call.  Please call our office back at [phone].    Thank You

## 2022-09-22 ENCOUNTER — OFFICE VISIT (OUTPATIENT)
Dept: OPHTHALMOLOGY | Facility: CLINIC | Age: 57
End: 2022-09-22
Payer: COMMERCIAL

## 2022-09-22 DIAGNOSIS — E11.9 DIABETES MELLITUS WITHOUT COMPLICATION: Primary | ICD-10-CM

## 2022-09-22 DIAGNOSIS — H52.13 MYOPIA OF BOTH EYES: ICD-10-CM

## 2022-09-22 DIAGNOSIS — E11.36 DIABETIC CATARACT: ICD-10-CM

## 2022-09-22 DIAGNOSIS — D31.32 CHOROIDAL NEVUS, LEFT: ICD-10-CM

## 2022-09-22 PROCEDURE — 3044F PR MOST RECENT HEMOGLOBIN A1C LEVEL <7.0%: ICD-10-PCS | Mod: CPTII,S$GLB,, | Performed by: OPTOMETRIST

## 2022-09-22 PROCEDURE — 4010F ACE/ARB THERAPY RXD/TAKEN: CPT | Mod: CPTII,S$GLB,, | Performed by: OPTOMETRIST

## 2022-09-22 PROCEDURE — 92015 DETERMINE REFRACTIVE STATE: CPT | Mod: S$GLB,,, | Performed by: OPTOMETRIST

## 2022-09-22 PROCEDURE — 99999 PR PBB SHADOW E&M-EST. PATIENT-LVL III: ICD-10-PCS | Mod: PBBFAC,,, | Performed by: OPTOMETRIST

## 2022-09-22 PROCEDURE — 1159F MED LIST DOCD IN RCRD: CPT | Mod: CPTII,S$GLB,, | Performed by: OPTOMETRIST

## 2022-09-22 PROCEDURE — 92015 PR REFRACTION: ICD-10-PCS | Mod: S$GLB,,, | Performed by: OPTOMETRIST

## 2022-09-22 PROCEDURE — 3044F HG A1C LEVEL LT 7.0%: CPT | Mod: CPTII,S$GLB,, | Performed by: OPTOMETRIST

## 2022-09-22 PROCEDURE — 99999 PR PBB SHADOW E&M-EST. PATIENT-LVL III: CPT | Mod: PBBFAC,,, | Performed by: OPTOMETRIST

## 2022-09-22 PROCEDURE — 1159F PR MEDICATION LIST DOCUMENTED IN MEDICAL RECORD: ICD-10-PCS | Mod: CPTII,S$GLB,, | Performed by: OPTOMETRIST

## 2022-09-22 PROCEDURE — 92014 PR EYE EXAM, EST PATIENT,COMPREHESV: ICD-10-PCS | Mod: S$GLB,,, | Performed by: OPTOMETRIST

## 2022-09-22 PROCEDURE — 4010F PR ACE/ARB THEARPY RXD/TAKEN: ICD-10-PCS | Mod: CPTII,S$GLB,, | Performed by: OPTOMETRIST

## 2022-09-22 PROCEDURE — 2023F PR DILATED RETINAL EXAM W/O EVID OF RETINOPATHY: ICD-10-PCS | Mod: CPTII,S$GLB,, | Performed by: OPTOMETRIST

## 2022-09-22 PROCEDURE — 92014 COMPRE OPH EXAM EST PT 1/>: CPT | Mod: S$GLB,,, | Performed by: OPTOMETRIST

## 2022-09-22 PROCEDURE — 2023F DILAT RTA XM W/O RTNOPTHY: CPT | Mod: CPTII,S$GLB,, | Performed by: OPTOMETRIST

## 2022-09-22 NOTE — PROGRESS NOTES
HPI    Diabetic eye exam  Diagnosed with diabetes several years ago  Recent vision fluctuations no  PCP tyler ness  Last A1C 5.7   Vision changes since last eye exam?: yes blurry     Any eye pain today: no    Other ocular symptoms: no    Interested in contact lens fitting today? no     Last edited by Diane Hawley on 9/22/2022  2:14 PM.            Assessment /Plan     For exam results, see Encounter Report.    Diabetes mellitus without complication  Last A1c 5.7 Stressed importance of DM control to preserve vision. No diabetic retinopathy was seen in either eye today. Continue strict blood glucose control.  Reviewed importance of yearly dilated eye exams. Continue close care with PCP regarding diabetes.    Diabetic cataract  Cataracts not significantly affecting activities of daily living and therefore surgery is not indicated at this time.   Will continue to monitor over the next 12 months. Pt to call or RTC with any significant change in vision prior to next visit.     Choroidal nevus, left  Flat, No lipo. Observe.     Myopia of both eyes  Eyeglass Final Rx       Eyeglass Final Rx         Sphere Cylinder Axis Add    Right -6.25 +3.00 175 +2.50    Left -7.00 +3.25 010 +2.50      Type: PAL                    RTC 1 yr for dilated eye exam or sooner if any changes to vision.   Discussed above and answered questions.

## 2022-09-26 ENCOUNTER — OUTPATIENT CASE MANAGEMENT (OUTPATIENT)
Dept: ADMINISTRATIVE | Facility: OTHER | Age: 57
End: 2022-09-26
Payer: COMMERCIAL

## 2022-09-26 NOTE — PROGRESS NOTES
Outpatient Care Management  Plan of Care Follow Up Visit    Patient: Vicky Alvarado  MRN: [id number]  Date of Service: 09/26/2022  Completed by: Rosalina Herr RN  Referral Date: 06/17/2022    Reason for Visit   Patient presents with    OPCM Chart Review     9/26/22    Update Plan Of Care     9/26/22       Brief Summary: Pt reports that she has been having some swelling to BLE but tries to keep elevated as much as possible. Pt reports SOB on exertion with increased activities. Pt admits she does not weight self daily. Pt reports she had hysterectomy and had to stay in ICU on ventilator for 3 days. Pt reports no complications of the surgery at this time.     Patient Summary     Involvement of Care:  Do I have permission to speak with other family members about your care?  Yes    Patient Reported Labs & Vitals:  1.  Any Patient Reported Labs & Vitals?  No  2.  Patient Reported Blood Pressure:     3.  Patient Reported Pulse:     4.  Patient Reported Weight (Kg):     5.  Patient Reported Blood Glucose (mg/dl):       Medical and social history was reviewed with patient and/or caregiver.     Clinical Assessment     Reviewed and provided basic information on available community resources for mental health, transportation, wellness resources, and palliative care programs with patient and/or caregiver.     Complex Care Plan     Care plan was discussed and completed today with input from patient and/or caregiver.    Patient Instructions     Instructions were provided via the [facility] patient resources and are available for the patient to view on the patient portal.    Next Steps: F/U concerning management of CHF    Follow up in about 2 weeks (around 10/10/2022).    Todays OPCM Self-Management Care Plan was developed with the patients/caregivers input and was based on identified barriers from todays assessment.  Goals were written today with the patient/caregiver and the patient has agreed to work towards these goals to  improve his/her overall well-being. Patient verbalized understanding of the care plan, goals, and all of today's instructions. Encouraged patient/caregiver to communicate with his/her physician and health care team about health conditions and the treatment plan.  Provided my contact information today and encouraged patient/caregiver to call me with any questions as needed.

## 2022-09-26 NOTE — PROGRESS NOTES
Outpatient Care Management  Plan of Care Follow Up Visit    Patient: Vicky Alvarado  MRN: [id number]  Date of Service: 7/8/22  Completed by: Rosalina Herr RN  Referral Date: 06/17/2022    Reason for Visit   Patient presents with    OPCM Chart Review     6/23/22    OPCM RN First Follow-Up Attempt     6/23/22    OPCM RN Second Follow-Up Attempt     6/30/22    OPCM RN Third Follow-Up Attempt     7/7/22    Update Plan Of Care     7/8/22       Brief Summary: Pt reports she has some swelling to BLE but she tries to keep legs elevating when sitting. Pt reports SOB with exertion with increased activities. Educated pt on management of CHF.      Patient Summary     Involvement of Care:  Do I have permission to speak with other family members about your care?  Yes    Patient Reported Labs & Vitals:  1.  Any Patient Reported Labs & Vitals?  No  2.  Patient Reported Blood Pressure:     3.  Patient Reported Pulse:     4.  Patient Reported Weight (Kg):     5.  Patient Reported Blood Glucose (mg/dl):       Medical and social history was reviewed with patient and/or caregiver.     Clinical Assessment     Reviewed and provided basic information on available community resources for mental health, transportation, wellness resources, and palliative care programs with patient and/or caregiver.     Complex Care Plan     Care plan was discussed and completed today with input from patient and/or caregiver.    Patient Instructions     Instructions were provided via the [facility] patient resources and are available for the patient to view on the patient portal.    Next Steps: F/U concerning management of CHF    Follow up in about 29 days (around 7/22/2022).    Todays OPCM Self-Management Care Plan was developed with the patients/caregivers input and was based on identified barriers from todays assessment.  Goals were written today with the patient/caregiver and the patient has agreed to work towards these goals to improve his/her  overall well-being. Patient verbalized understanding of the care plan, goals, and all of today's instructions. Encouraged patient/caregiver to communicate with his/her physician and health care team about health conditions and the treatment plan.  Provided my contact information today and encouraged patient/caregiver to call me with any questions as needed.

## 2022-10-05 NOTE — PROGRESS NOTES
Ms. Alvarado is a patient of Dr. Hawley and was last seen in clinic 4/26/2022.      Subjective:   Patient ID:  Vicky Alvarado is a 57 y.o. female who presents for follow-up of Atrial Fibrillation  .     HPI:    Ms. Alvarado is a 57 y.o. female with MVP, HTN, MDD, opioid dependence hx, AF (PVI 7/20/2020; 1/22/2022) here for follow up.    Background:    7/7/2020: 1/9/20, she had her upper teeth pulled for dentures and been on penicillin since that time. She was in SR. She takes atenolol 10 mg q.d. for hypertension. She underwent REENA/CV 1/29/2020. EF 40% in AF. PET Stress 3/2020 revealed no ischemia. Repeat echo in NSR showed EF 60%. She has had recurrence, once during carpal tunnel surgery, and several other times since.  Regarding her family history, her mother and brother both have atrial fibrillation. She is on elquis for CVA prophylaxis. CHADSVASC of at least 3. No bleeding issues with xarelto.      10/2020: PVI 7/20/2020. 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 She underwent emergent exploratory surgery by Dr Rose who ligated a branch of her left SFA. She had further wound issues requiring wash out and wound vac over the next few months. This has been a challenging recovery but she is nearing completion. No symptomatic or documented AF recurrence.     3/21: AF/RVR recurrence with ER visit. Flecainide 100 mg bid started 3/1/21. No recurrence since. Some PVCs    6/21: Her leg incision has healed. She has resumed work. Over the past month, she has developed more NAPIER.     9/21/21: HTN meds adjusted. Lasix started, hctz stopped, nifedipine started. Labs showed anemia. Endoscopy with no lesions. Respiratory evaluation performed without obvious culprit. LE edema worsened. She stopped flecainide with resolution of symptoms.  She has had some palpitations.     12/21: Rythmol started for pAF. She feels that her AF has returned recently with some long sustained events. In SR  today. She feels that the AF is compromising her QOL and asks about repeat ablation.     4/26/2022: Repeat ablation 1/22 with rare and brief, longest 1h. She has since developed  bleeding and is due for hysterectomy later this spring. Symptoms much improved.   56 yoF here for AF management. Rare and brief recurrence since second ablation. Continue current therapy. With regard to upcoming hysterectomy, she is at low risk for adverse cardiac arrhythmia events.     Update (10/10/2022):    5/2022 cellulitis hospitalization.    Underwent hysterectomy 8/15/2022. Was intubated for a few days during hospitalization. Brief AF after extubation.     Today her primary complaints are continued fatigue and NAPIER. Interfering with daily routine - has not worked recently. Wears CPAP. She think she had 2 episodes of brief AF since last clinic visit. No exertional CP, LH, syncope.    She is currently taking eliquis 5mg BID for stroke prophylaxis. Did have some vaginal bleeding which resolved after holding eliquis 2 days. She is currently being treated with propafenone 225mg BID for rhythm control and toprol 50mg BID for HR control.  Kidney function is stable, with a creatinine of 1.1 on 8/12/2022.    I have personally reviewed the patient's EKG today, which shows sinus rhythm with RBBB at 72bpm. MA interval is 148. QRS is 150. QTc is 473. RBBB new since March 2022.     Relevant Cardiac Test Results:    2D Echo (6/30/2021):  The left ventricle is normal in size with concentric remodeling and normal systolic function. The estimated ejection fraction is 60%.  Normal left ventricular diastolic function.  Normal right ventricular size with normal right ventricular systolic function.  Normal central venous pressure (3 mmHg).  Aortic valve area is 1.63 cm2; peak velocity is 2.63 m/s; mean gradient is 17 mmHg.  There is mild aortic valve stenosis.  The estimated ejection fraction is 60%.    Current Outpatient Medications   Medication Sig     acetaminophen (TYLENOL) 500 MG tablet Take 2 tablets (1,000 mg) by mouth at onset of pain or headache, may repeat if needed.    ALPRAZolam (XANAX) 0.5 MG tablet Take 1 tablet (0.5 mg total) by mouth 2 (two) times daily as needed for Anxiety.    apixaban (ELIQUIS) 5 mg Tab Take 1 tablet (5 mg total) by mouth 2 (two) times daily.    atorvastatin (LIPITOR) 40 MG tablet Take 1 tablet (40 mg total) by mouth once daily. Patient needs to schedule an appointment in the consult cardiology clinic.    b complex vitamins capsule Take 1 capsule by mouth once daily.    colchicine (COLCRYS) 0.6 mg tablet For gout attack: Take TWO tablets by mouth, then, 2 hours later, take ONE additional tablet. Then take 1 tablet twice daily only if still needed for gout pain.    DULoxetine (CYMBALTA) 60 MG capsule Take 2 capsules (120 mg total) by mouth once daily.    ferrous sulfate (SLOW RELEASE IRON) 142 mg (45 mg iron) TbSR Take 1 tablet (142 mg total) by mouth once daily. FOR 7 DAY THEN INCREASE TO TWICE DAILY AS TOLERATED    furosemide (LASIX) 40 MG tablet Take 1 tablet (40 mg total) by mouth 2 (two) times daily. Resume as needed for edema until followup with your PCP.    [facility] [id number] 750 mg-644 mg- 30 mg-1 mg Tab Take 1 tablet by mouth once daily.     ibuprofen (ADVIL,MOTRIN) 600 MG tablet Take 1 tablet (600 mg total) by mouth every 6 (six) hours as needed for Pain.    krill/om-3/dha/epa/phospho/ast (MEGARED OMEGA-3 KRILL OIL ORAL) Take 1 capsule by mouth once daily.    lisinopriL (PRINIVIL,ZESTRIL) 40 MG tablet Take 1 tablet (40 mg total) by mouth once daily.    melatonin 10 mg Tab Take 1-2 tablets by mouth nightly as needed (Sleep).    metoprolol succinate (TOPROL-XL) 50 MG 24 hr tablet Take 1 tablet (50 mg total) by mouth 2 (two) times daily.    multivitamin (THERAGRAN) per tablet Take 1 tablet by mouth once daily.    NIFEdipine (PROCARDIA-XL) 90 MG (OSM) 24 hr tablet Take 1 tablet (90 mg total) by mouth once daily.  "HOLD UNTIL FOLLOW-UP WITH YOUR PCP.    omeprazole (PRILOSEC) 20 MG capsule TAKE 1 CAPSULE BY MOUTH ONCE DAILY    potassium chloride SA (K-DUR,KLOR-CON) 20 MEQ tablet Take 1 tablet (20 mEq total) by mouth once daily. ONLY TAKE WITH LASIX.    predniSONE (DELTASONE) 10 MG tablet Take 1 tablet by mouth daily (Patient taking differently: Take by mouth once daily. prn)    propafenone (RYTHMOL) 225 MG Tab Take 1 tablet (225 mg total) by mouth every 8 (eight) hours.    SENNA 8.6 mg tablet Take 1 tablet by mouth 2 (two) times daily. (Patient taking differently: Take 1 tablet by mouth 2 (two) times daily. prn)    traZODone (DESYREL) 100 MG tablet Take 1 tablet (100 mg total) by mouth nightly as needed for Insomnia.     No current facility-administered medications for this visit.     Facility-Administered Medications Ordered in Other Visits   Medication    sodium chloride 0.9% bolus 1,000 mL     Review of Systems   Constitutional: Positive for malaise/fatigue.   Cardiovascular:  Positive for dyspnea on exertion. Negative for chest pain, irregular heartbeat, leg swelling and palpitations.   Respiratory:  Negative for shortness of breath.    Hematologic/Lymphatic: Bruises/bleeds easily.   Skin:  Negative for rash.   Musculoskeletal:  Negative for myalgias.   Gastrointestinal:  Negative for hematemesis, hematochezia and nausea.   Genitourinary:  Negative for hematuria.   Neurological:  Negative for light-headedness.   Psychiatric/Behavioral:  Negative for altered mental status.    Allergic/Immunologic: Negative for persistent infections.     Objective:          /74   Pulse 72   Ht 5' 6" (1.676 m)   Wt 123.2 kg (271 lb 9.7 oz)   LMP 03/25/2022 (Exact Date)   BMI 43.84 kg/m²     Physical Exam  Vitals and nursing note reviewed.   Constitutional:       Appearance: Normal appearance. She is well-developed.   HENT:      Head: Normocephalic.      Nose: Nose normal.   Eyes:      Pupils: Pupils are equal, round, and reactive to " light.   Cardiovascular:      Rate and Rhythm: Normal rate and regular rhythm.   Pulmonary:      Effort: No respiratory distress.      Breath sounds: Normal breath sounds.   Musculoskeletal:         General: Normal range of motion.   Skin:     General: Skin is warm and dry.      Findings: No erythema.   Neurological:      Mental Status: She is alert and oriented to person, place, and time.   Psychiatric:         Speech: Speech normal.         Behavior: Behavior normal.     Lab Results   Component Value Date     08/12/2022    K 4.3 08/12/2022    MG 1.6 08/10/2022    BUN 18 08/12/2022    CREATININE 1.1 08/12/2022    ALT 13 08/10/2022    AST 63 (H) 08/10/2022    HGB 8.6 (L) 08/12/2022    HCT 29.7 (L) 08/12/2022    HCT 28 (L) 08/11/2022    TSH 1.744 07/13/2021    LDLCALC 92.6 07/06/2021       Recent Labs   Lab 07/07/20  1100 07/20/20  2054 01/18/22  0857 02/18/22  1042   INR 0.9 1.0 1.1 1.1         Assessment:     1. Longstanding persistent atrial fibrillation    2. Essential hypertension    3. Morbid obesity with BMI of 45.0-49.9, adult    4. ERENDIRA (obstructive sleep apnea)      Plan:     In summary, Ms. Alvarado is a 57 y.o. female with MVP, HTN, MDD, opioid dependence hx, AF (PVI 7/20/2020; 1/22/2022) here for follow up.  Pt is now 2 mo s/p hysterectomy. Some bleeding after which resolved after holding eliquis 2 days. She had a couple of brief AF episodes since last clinic visit but none in past month. On propafenone. EKG showing RBBB. Will confirm stopping propafenone with Dr. Hawley. Update CBC. Update echo given NAPIER.     TSH, CBC, Echo  Stop propafenone given RBBB? (UPDATE: Will stop propafenone)  Continue other meds for now  RTC 6 mo, sooner if needed    *A copy of this note has been sent to Dr. Hawley*    Follow up in about 6 months (around 4/10/2023).    ------------------------------------------------------------------    SCOUT Payton, NP-C  Cardiac Electrophysiology

## 2022-10-10 ENCOUNTER — HOSPITAL ENCOUNTER (OUTPATIENT)
Dept: CARDIOLOGY | Facility: CLINIC | Age: 57
Discharge: HOME | End: 2022-10-10
Payer: COMMERCIAL

## 2022-10-10 ENCOUNTER — OFFICE VISIT (OUTPATIENT)
Dept: ELECTROPHYSIOLOGY | Facility: CLINIC | Age: 57
End: 2022-10-10
Payer: COMMERCIAL

## 2022-10-10 ENCOUNTER — PATIENT MESSAGE (OUTPATIENT)
Dept: ELECTROPHYSIOLOGY | Facility: CLINIC | Age: 57
End: 2022-10-10

## 2022-10-10 ENCOUNTER — OUTPATIENT CASE MANAGEMENT (OUTPATIENT)
Dept: ADMINISTRATIVE | Facility: OTHER | Age: 57
End: 2022-10-10
Payer: COMMERCIAL

## 2022-10-10 ENCOUNTER — TELEPHONE (OUTPATIENT)
Dept: ELECTROPHYSIOLOGY | Facility: CLINIC | Age: 57
End: 2022-10-10

## 2022-10-10 ENCOUNTER — HOSPITAL ENCOUNTER (OUTPATIENT)
Dept: CARDIOLOGY | Facility: HOSPITAL | Age: 57
Discharge: HOME | End: 2022-10-10
Attending: NURSE PRACTITIONER
Payer: COMMERCIAL

## 2022-10-10 VITALS
BODY MASS INDEX: 43.55 KG/M2 | WEIGHT: 271 LBS | SYSTOLIC BLOOD PRESSURE: 130 MMHG | DIASTOLIC BLOOD PRESSURE: 70 MMHG | HEART RATE: 60 BPM | HEIGHT: 66 IN

## 2022-10-10 VITALS
SYSTOLIC BLOOD PRESSURE: 138 MMHG | BODY MASS INDEX: 43.65 KG/M2 | HEART RATE: 72 BPM | DIASTOLIC BLOOD PRESSURE: 74 MMHG | WEIGHT: 271.63 LBS | HEIGHT: 66 IN

## 2022-10-10 DIAGNOSIS — E66.01 MORBID OBESITY WITH BMI OF 45.0-49.9, ADULT: ICD-10-CM

## 2022-10-10 DIAGNOSIS — I10 ESSENTIAL HYPERTENSION: ICD-10-CM

## 2022-10-10 DIAGNOSIS — I48.11 LONGSTANDING PERSISTENT ATRIAL FIBRILLATION: ICD-10-CM

## 2022-10-10 DIAGNOSIS — D64.9 ANEMIA, UNSPECIFIED TYPE: Primary | ICD-10-CM

## 2022-10-10 DIAGNOSIS — G47.33 OSA (OBSTRUCTIVE SLEEP APNEA): ICD-10-CM

## 2022-10-10 DIAGNOSIS — I48.11 LONGSTANDING PERSISTENT ATRIAL FIBRILLATION: Primary | ICD-10-CM

## 2022-10-10 DIAGNOSIS — I48.91 ATRIAL FIBRILLATION, UNSPECIFIED TYPE: ICD-10-CM

## 2022-10-10 LAB
ASCENDING AORTA: 3.34 CM
AV INDEX (PROSTH): 0.56
AV MEAN GRADIENT: 14 MMHG
AV PEAK GRADIENT: 35 MMHG
AV VALVE AREA: 1.66 CM2
AV VELOCITY RATIO: 0.5
BSA FOR ECHO PROCEDURE: 2.39 M2
CV ECHO LV RWT: 0.35 CM
DOP CALC AO PEAK VEL: 2.94 M/S
DOP CALC AO VTI: 62.88 CM
DOP CALC LVOT AREA: 3 CM2
DOP CALC LVOT DIAMETER: 1.94 CM
DOP CALC LVOT PEAK VEL: 1.47 M/S
DOP CALC LVOT STROKE VOLUME: 104.47 CM3
DOP CALCLVOT PEAK VEL VTI: 35.36 CM
E WAVE DECELERATION TIME: 191.18 MSEC
E/A RATIO: 1.56
E/E' RATIO: 14 M/S
ECHO LV POSTERIOR WALL: 0.94 CM (ref 0.6–1.1)
EJECTION FRACTION: 60 %
FRACTIONAL SHORTENING: 26 % (ref 28–44)
INTERVENTRICULAR SEPTUM: 1 CM (ref 0.6–1.1)
IVRT: 91.34 MSEC
LA MAJOR: 6.4 CM
LA MINOR: 6.38 CM
LA WIDTH: 5.11 CM
LEFT ATRIUM SIZE: 3.6 CM
LEFT ATRIUM VOLUME INDEX MOD: 59.3 ML/M2
LEFT ATRIUM VOLUME INDEX: 43.8 ML/M2
LEFT ATRIUM VOLUME MOD: 135.26 CM3
LEFT ATRIUM VOLUME: 99.92 CM3
LEFT INTERNAL DIMENSION IN SYSTOLE: 4.01 CM (ref 2.1–4)
LEFT VENTRICLE DIASTOLIC VOLUME INDEX: 63.07 ML/M2
LEFT VENTRICLE DIASTOLIC VOLUME: 143.81 ML
LEFT VENTRICLE MASS INDEX: 88 G/M2
LEFT VENTRICLE SYSTOLIC VOLUME INDEX: 30.8 ML/M2
LEFT VENTRICLE SYSTOLIC VOLUME: 70.28 ML
LEFT VENTRICULAR INTERNAL DIMENSION IN DIASTOLE: 5.44 CM (ref 3.5–6)
LEFT VENTRICULAR MASS: 201.08 G
LV LATERAL E/E' RATIO: 12.25 M/S
LV SEPTAL E/E' RATIO: 16.33 M/S
MV A" WAVE DURATION": 22.45 MSEC
MV PEAK A VEL: 0.63 M/S
MV PEAK E VEL: 0.98 M/S
MV STENOSIS PRESSURE HALF TIME: 55.44 MS
MV VALVE AREA P 1/2 METHOD: 3.97 CM2
PISA TR MAX VEL: 2.81 M/S
PULM VEIN S/D RATIO: 0.53
PV PEAK D VEL: 0.7 M/S
PV PEAK S VEL: 0.37 M/S
RA MAJOR: 4.9 CM
RA PRESSURE: 3 MMHG
RA WIDTH: 3.59 CM
RIGHT VENTRICULAR END-DIASTOLIC DIMENSION: 3.58 CM
RV TISSUE DOPPLER FREE WALL SYSTOLIC VELOCITY 1 (APICAL 4 CHAMBER VIEW): 10.32 CM/S
SINUS: 3.29 CM
STJ: 2.77 CM
TDI LATERAL: 0.08 M/S
TDI SEPTAL: 0.06 M/S
TDI: 0.07 M/S
TR MAX PG: 32 MMHG
TRICUSPID ANNULAR PLANE SYSTOLIC EXCURSION: 2.18 CM
TV REST PULMONARY ARTERY PRESSURE: 35 MMHG

## 2022-10-10 PROCEDURE — 3008F BODY MASS INDEX DOCD: CPT | Mod: CPTII,S$GLB,, | Performed by: NURSE PRACTITIONER

## 2022-10-10 PROCEDURE — 3075F PR MOST RECENT SYSTOLIC BLOOD PRESS GE 130-139MM HG: ICD-10-PCS | Mod: CPTII,S$GLB,, | Performed by: NURSE PRACTITIONER

## 2022-10-10 PROCEDURE — 3008F PR BODY MASS INDEX (BMI) DOCUMENTED: ICD-10-PCS | Mod: CPTII,S$GLB,, | Performed by: NURSE PRACTITIONER

## 2022-10-10 PROCEDURE — 99214 OFFICE O/P EST MOD 30 MIN: CPT | Mod: S$GLB,,, | Performed by: NURSE PRACTITIONER

## 2022-10-10 PROCEDURE — 93306 ECHO (CUPID ONLY): ICD-10-PCS | Mod: 26,,, | Performed by: INTERNAL MEDICINE

## 2022-10-10 PROCEDURE — 93306 TTE W/DOPPLER COMPLETE: CPT

## 2022-10-10 PROCEDURE — 93005 ELECTROCARDIOGRAM TRACING: CPT | Mod: S$GLB,,, | Performed by: INTERNAL MEDICINE

## 2022-10-10 PROCEDURE — 93005 RHYTHM STRIP: ICD-10-PCS | Mod: S$GLB,,, | Performed by: INTERNAL MEDICINE

## 2022-10-10 PROCEDURE — 3044F HG A1C LEVEL LT 7.0%: CPT | Mod: CPTII,S$GLB,, | Performed by: NURSE PRACTITIONER

## 2022-10-10 PROCEDURE — 4010F PR ACE/ARB THEARPY RXD/TAKEN: ICD-10-PCS | Mod: CPTII,S$GLB,, | Performed by: NURSE PRACTITIONER

## 2022-10-10 PROCEDURE — 3078F DIAST BP <80 MM HG: CPT | Mod: CPTII,S$GLB,, | Performed by: NURSE PRACTITIONER

## 2022-10-10 PROCEDURE — 93010 ELECTROCARDIOGRAM REPORT: CPT | Mod: S$GLB,,, | Performed by: INTERNAL MEDICINE

## 2022-10-10 PROCEDURE — 99999 PR PBB SHADOW E&M-EST. PATIENT-LVL V: CPT | Mod: PBBFAC,,, | Performed by: NURSE PRACTITIONER

## 2022-10-10 PROCEDURE — 3075F SYST BP GE 130 - 139MM HG: CPT | Mod: CPTII,S$GLB,, | Performed by: NURSE PRACTITIONER

## 2022-10-10 PROCEDURE — 1159F MED LIST DOCD IN RCRD: CPT | Mod: CPTII,S$GLB,, | Performed by: NURSE PRACTITIONER

## 2022-10-10 PROCEDURE — 4010F ACE/ARB THERAPY RXD/TAKEN: CPT | Mod: CPTII,S$GLB,, | Performed by: NURSE PRACTITIONER

## 2022-10-10 PROCEDURE — 1160F RVW MEDS BY RX/DR IN RCRD: CPT | Mod: CPTII,S$GLB,, | Performed by: NURSE PRACTITIONER

## 2022-10-10 PROCEDURE — 3078F PR MOST RECENT DIASTOLIC BLOOD PRESSURE < 80 MM HG: ICD-10-PCS | Mod: CPTII,S$GLB,, | Performed by: NURSE PRACTITIONER

## 2022-10-10 PROCEDURE — 1159F PR MEDICATION LIST DOCUMENTED IN MEDICAL RECORD: ICD-10-PCS | Mod: CPTII,S$GLB,, | Performed by: NURSE PRACTITIONER

## 2022-10-10 PROCEDURE — 93306 TTE W/DOPPLER COMPLETE: CPT | Mod: 26,,, | Performed by: INTERNAL MEDICINE

## 2022-10-10 PROCEDURE — 99999 PR PBB SHADOW E&M-EST. PATIENT-LVL V: ICD-10-PCS | Mod: PBBFAC,,, | Performed by: NURSE PRACTITIONER

## 2022-10-10 PROCEDURE — 3044F PR MOST RECENT HEMOGLOBIN A1C LEVEL <7.0%: ICD-10-PCS | Mod: CPTII,S$GLB,, | Performed by: NURSE PRACTITIONER

## 2022-10-10 PROCEDURE — 1160F PR REVIEW ALL MEDS BY PRESCRIBER/CLIN PHARMACIST DOCUMENTED: ICD-10-PCS | Mod: CPTII,S$GLB,, | Performed by: NURSE PRACTITIONER

## 2022-10-10 PROCEDURE — 99214 PR OFFICE/OUTPT VISIT, EST, LEVL IV, 30-39 MIN: ICD-10-PCS | Mod: S$GLB,,, | Performed by: NURSE PRACTITIONER

## 2022-10-10 PROCEDURE — 93010 RHYTHM STRIP: ICD-10-PCS | Mod: S$GLB,,, | Performed by: INTERNAL MEDICINE

## 2022-10-10 NOTE — TELEPHONE ENCOUNTER
Blood count decreasing. Pt had post-op bleeding after hysterectomy. Will hold eliquis for now and repeat CBC in 1 week.

## 2022-10-10 NOTE — Clinical Note
Pt s/p PVIx2. Brief palps since procedure. RBBB on EKG. Ok to stop propafenone at this time, and consider another AAD if she has sustained recurrence? Thx

## 2022-10-10 NOTE — LETTER
October 27, 2022    Vicky Alvarado  [address]  [place] [address]             [facility] [address] [place] [address] Dear MsHuseyin Perry,    This is WILBERT Romano with [facility] Outpatient Case Management Department. I have been unsuccessful at reaching you to follow-up to see how you have been doing. Please call me back at your earliest convenience to discuss your health care needs.      I can be reached at [phone] from 8:00AM to 4:30 PM on Monday thru Friday. [facility] also has a program where a nurse is available 24/7 to answer questions or provide medical advice, their number is [phone].    Thanks,      Rosalina Herr RN  Outpatient Case Management/Disease Management  [phone]

## 2022-10-12 ENCOUNTER — PATIENT MESSAGE (OUTPATIENT)
Dept: CARDIOLOGY | Facility: CLINIC | Age: 57
End: 2022-10-12
Payer: COMMERCIAL

## 2022-10-12 DIAGNOSIS — I48.91 ATRIAL FIBRILLATION, UNSPECIFIED TYPE: Primary | ICD-10-CM

## 2022-10-12 DIAGNOSIS — I35.0 MILD AORTIC STENOSIS: ICD-10-CM

## 2022-10-12 DIAGNOSIS — I10 ESSENTIAL HYPERTENSION: ICD-10-CM

## 2022-10-12 DIAGNOSIS — E11.42 TYPE 2 DIABETES MELLITUS WITH DIABETIC POLYNEUROPATHY, WITHOUT LONG-TERM CURRENT USE OF INSULIN: Chronic | ICD-10-CM

## 2022-10-12 RX ORDER — ATORVASTATIN CALCIUM 40 MG/1
40 TABLET, FILM COATED ORAL DAILY
Qty: 90 TABLET | Refills: 3 | Status: SHIPPED | OUTPATIENT
Start: 2022-10-12 | End: 2023-03-16

## 2022-10-18 ENCOUNTER — LAB VISIT (OUTPATIENT)
Dept: LAB | Facility: HOSPITAL | Age: 57
End: 2022-10-18
Payer: COMMERCIAL

## 2022-10-18 DIAGNOSIS — D64.9 ANEMIA, UNSPECIFIED TYPE: ICD-10-CM

## 2022-10-18 LAB
ANISOCYTOSIS BLD QL SMEAR: SLIGHT
BASOPHILS # BLD AUTO: 0.08 K/UL (ref 0–0.2)
BASOPHILS NFR BLD: 1.2 % (ref 0–1.9)
DIFFERENTIAL METHOD: ABNORMAL
DOHLE BOD BLD QL SMEAR: PRESENT
EOSINOPHIL # BLD AUTO: 0.2 K/UL (ref 0–0.5)
EOSINOPHIL NFR BLD: 3.5 % (ref 0–8)
ERYTHROCYTE [DISTWIDTH] IN BLOOD BY AUTOMATED COUNT: 19 % (ref 11.5–14.5)
FERRITIN SERPL-MCNC: 19 NG/ML (ref 20–300)
HCT VFR BLD AUTO: 29.1 % (ref 37–48.5)
HGB BLD-MCNC: 7.9 G/DL (ref 12–16)
IMM GRANULOCYTES # BLD AUTO: 0.02 K/UL (ref 0–0.04)
IMM GRANULOCYTES NFR BLD AUTO: 0.3 % (ref 0–0.5)
IRON SERPL-MCNC: 34 UG/DL (ref 30–160)
LYMPHOCYTES # BLD AUTO: 1.2 K/UL (ref 1–4.8)
LYMPHOCYTES NFR BLD: 19 % (ref 18–48)
MCH RBC QN AUTO: 22.8 PG (ref 27–31)
MCHC RBC AUTO-ENTMCNC: 27.1 G/DL (ref 32–36)
MCV RBC AUTO: 84 FL (ref 82–98)
MONOCYTES # BLD AUTO: 0.5 K/UL (ref 0.3–1)
MONOCYTES NFR BLD: 7.5 % (ref 4–15)
NEUTROPHILS # BLD AUTO: 4.5 K/UL (ref 1.8–7.7)
NEUTROPHILS NFR BLD: 68.5 % (ref 38–73)
NRBC BLD-RTO: 0 /100 WBC
PLATELET # BLD AUTO: 158 K/UL (ref 150–450)
PLATELET BLD QL SMEAR: ABNORMAL
PMV BLD AUTO: 11.8 FL (ref 9.2–12.9)
POLYCHROMASIA BLD QL SMEAR: ABNORMAL
RBC # BLD AUTO: 3.46 M/UL (ref 4–5.4)
RETICS/RBC NFR AUTO: 4.4 % (ref 0.5–2.5)
SATURATED IRON: 6 % (ref 20–50)
SPHEROCYTES BLD QL SMEAR: ABNORMAL
TOTAL IRON BINDING CAPACITY: 551 UG/DL (ref 250–450)
TOXIC GRANULES BLD QL SMEAR: PRESENT
TRANSFERRIN SERPL-MCNC: 372 MG/DL (ref 200–375)
VIT B12 SERPL-MCNC: 892 PG/ML (ref 210–950)
WBC # BLD AUTO: 6.52 K/UL (ref 3.9–12.7)
WBC TOXIC VACUOLES BLD QL SMEAR: PRESENT

## 2022-10-18 PROCEDURE — 82607 VITAMIN B-12: CPT | Performed by: INTERNAL MEDICINE

## 2022-10-18 PROCEDURE — 85045 AUTOMATED RETICULOCYTE COUNT: CPT | Performed by: INTERNAL MEDICINE

## 2022-10-18 PROCEDURE — 82728 ASSAY OF FERRITIN: CPT | Performed by: INTERNAL MEDICINE

## 2022-10-18 PROCEDURE — 36415 COLL VENOUS BLD VENIPUNCTURE: CPT | Performed by: INTERNAL MEDICINE

## 2022-10-18 PROCEDURE — 84466 ASSAY OF TRANSFERRIN: CPT | Performed by: INTERNAL MEDICINE

## 2022-10-18 PROCEDURE — 85025 COMPLETE CBC W/AUTO DIFF WBC: CPT | Performed by: NURSE PRACTITIONER

## 2022-10-19 ENCOUNTER — TELEPHONE (OUTPATIENT)
Dept: ELECTROPHYSIOLOGY | Facility: CLINIC | Age: 57
End: 2022-10-19
Payer: COMMERCIAL

## 2022-10-19 DIAGNOSIS — D64.9 ANEMIA, UNSPECIFIED TYPE: Primary | ICD-10-CM

## 2022-10-27 NOTE — PROGRESS NOTES
Outpatient Care Management  Plan of Care Follow Up Visit    Patient: Vicky Alvarado  MRN: [id number]  Date of Service: 10/31/2022  Completed by: Rosalina Herr RN  Referral Date: 06/17/2022    Reason for Visit   Patient presents with    OPCM Chart Review     10/10/22    OPCM RN First Follow-Up Attempt     10/10/22    OPCM RN Second Follow-Up Attempt     10/27/22 - Attempt letter mailed     Update Plan Of Care     10/31/22       Brief Summary: Pt reports that she has been experiencing issues with low blood count and bleeding after her hysterectomy, but she had labs drawn today and H&H has improved. Eliquis was on hold but she has now restarted it. Educated pt on ways to monitor and manage bleeding while on blood thinner. Pt taking iron supplement. Pt reports she is still having issues with SOB, fatigue, and swelling to BLE. Reinforced with pt on ways to manage CHF. Pt reports the SOB and fatigue also affects her when taking a shower; pt has a shower chair. Pt reports weight has been consistently the same for the past 3 weeks.      Patient Summary     Involvement of Care:  Do I have permission to speak with other family members about your care?  No    Patient Reported Labs & Vitals:  1.  Any Patient Reported Labs & Vitals?  No  2.  Patient Reported Blood Pressure:     3.  Patient Reported Pulse:     4.  Patient Reported Weight (Kg):     5.  Patient Reported Blood Glucose (mg/dl):       Medical and social history was reviewed with patient and/or caregiver.     Clinical Assessment     Reviewed and provided basic information on available community resources for mental health, transportation, wellness resources, and palliative care programs with patient and/or caregiver.     Complex Care Plan     Care plan was discussed and completed today with input from patient and/or caregiver.    Patient Instructions     Instructions were provided via the [employer] patient resources and are available for the patient to view on the  patient portal.    Next Steps: F/U concerning management of bleeding and CHF    Follow up in about 6 weeks (around 11/21/2022).    Todays OPCM Self-Management Care Plan was developed with the patients/caregivers input and was based on identified barriers from todays assessment.  Goals were written today with the patient/caregiver and the patient has agreed to work towards these goals to improve his/her overall well-being. Patient verbalized understanding of the care plan, goals, and all of today's instructions. Encouraged patient/caregiver to communicate with his/her physician and health care team about health conditions and the treatment plan.  Provided my contact information today and encouraged patient/caregiver to call me with any questions as needed.

## 2022-10-28 NOTE — PROGRESS NOTES
CC: Postop visit    Vicky Alvarado is a 57 y.o. female  post-op from a RALH/BSO on 22.         visit:  Had spotting until last night - had 2 episodes of heavy vaginal bleeding with clots.  She held her Eliquis this morning  Today:  Patient is doing well overall.  no further vaginal bleeding after that episode 5-6 weeks ago  No bowel or bladder complaints.  No fever.    Is still anemic - plans to follow-up with PCP or hematology     Pathology: benign     Patient noted to have multinodular liver on LSC evaluation of upper abdomen at time of hysterectomy - needs GI referral    Past Medical History:   Diagnosis Date    Anticoagulant long-term use     Arthritis     Atrial fibrillation     cardioversion    Atrial fibrillation with RVR     Avascular necrosis     L hand    CHF (congestive heart failure)     Depression     Encounter for blood transfusion 2020    Encounter for blood transfusion 2022    GERD (gastroesophageal reflux disease)     Hx of psychiatric care     Hypertension     Iron deficiency anemia 2022    TIBC 444 with saturated iron 8    Obese     Pre-diabetes 2022    Psychiatric problem     Sleep apnea     wears cpap     Past Surgical History:   Procedure Laterality Date    ABLATION OF ARRHYTHMOGENIC FOCUS FOR ATRIAL FIBRILLATION N/A 2020    Procedure: Ablation atrial fibrillation;  Surgeon: Gabriel Hawley MD;  Location: [facility] EP LAB;  Service: Cardiology;  Laterality: N/A;  afib, PVI, RFA, REENA, SHADY, anes, MB, 3 Prep    ABLATION OF ARRHYTHMOGENIC FOCUS FOR ATRIAL FIBRILLATION N/A 2022    Procedure: Ablation atrial fibrillation;  Surgeon: Gabriel Hawley MD;  Location: [facility] EP LAB;  Service: Cardiology;  Laterality: N/A;  afib/afl, PVI (re-do)/CTI, RFA, REENA (cx if SR), SHADY, anes, MB, 3 Prep    APPLICATION OF WOUND VACUUM-ASSISTED CLOSURE DEVICE Left 8/3/2020    Procedure: APPLICATION, WOUND VAC;  Surgeon: SEMAJ Márquez III, MD;  Location: [address]  [facility]R;  Service: Peripheral Vascular;  Laterality: Left;    APPLICATION OF WOUND VACUUM-ASSISTED CLOSURE DEVICE Left 8/6/2020    Procedure: APPLICATION, WOUND VAC;  Surgeon: SEMAJ Márquez III, MD;  Location: [facility] OR 2ND FLR;  Service: Peripheral Vascular;  Laterality: Left;    CARPAL TUNNEL RELEASE Right 6/10/2020    Procedure: RELEASE, CARPAL TUNNEL - RIGHT;  Surgeon: Adelaida Hall MD;  Location: [facility] OR;  Service: Orthopedics;  Laterality: Right;  GENERAL AND REGIONAL    CARPAL TUNNEL RELEASE Left 5/5/2021    Procedure: RELEASE, CARPAL TUNNEL, LEFT;  Surgeon: Adelaida Hall MD;  Location: [facility] OR;  Service: Orthopedics;  Laterality: Left;  GENERAL & REGIONAL IN PACU    CLOSURE OF WOUND Left 8/6/2020    Procedure: CLOSURE, WOUND;  Surgeon: SEMAJ Márquez III, MD;  Location: [facility] OR 2ND FLR;  Service: Peripheral Vascular;  Laterality: Left;  Complex    COLONOSCOPY N/A 8/17/2021    Procedure: COLONOSCOPY Suprep;  Surgeon: Loco Deluca MD;  Location: [facility];  Service: Endoscopy;  Laterality: N/A;    ESOPHAGOGASTRODUODENOSCOPY N/A 8/17/2021    Procedure: EGD (ESOPHAGOGASTRODUODENOSCOPY);  Surgeon: Loco Deluca MD;  Location: [facility];  Service: Endoscopy;  Laterality: N/A;  Patient is schedule to have her Covid test on 08/14/2021 at the [facility] @ 9:30am. [place].    EXPLORATION OF FEMORAL ARTERY Left 7/21/2020    Procedure: EXPLORATION, ARTERY, FEMORAL;  Surgeon: SEMAJ Márquez III, MD;  Location: [facility] OR 2ND FLR;  Service: Peripheral Vascular;  Laterality: Left;  1. Urgent Direct repair, L SFA branch laceration    FOOT SURGERY      HYSTEROSCOPY WITH DILATION AND CURETTAGE OF UTERUS N/A 2/19/2022    Procedure: HYSTEROSCOPY, WITH DILATION AND CURETTAGE OF UTERUS;  Surgeon: Shane Palomo MD;  Location: [facility] OR 2ND FLR;  Service: OB/GYN;  Laterality: N/A;    INCISION AND DRAINAGE OF KNEE Left 5/12/2022    Procedure: INCISION AND DRAINAGE, Prepatellar bursectomy.;  Surgeon:  "Dre Guzmán MD;  [address]  Service: Orthopedics;  Laterality: Left;    ROBOT-ASSISTED LAPAROSCOPIC ABDOMINAL HYSTERECTOMY USING DA KEIRY XI N/A 2022    Procedure: XI ROBOTIC HYSTERECTOMY;  Surgeon: Yolanda Barriga MD;  Location: [facility];  Service: OB/GYN;  Laterality: N/A;  dual console requested    ROBOT-ASSISTED LAPAROSCOPIC SALPINGO-OOPHORECTOMY Bilateral 2022    Procedure: ROBOTIC SALPINGO-OOPHORECTOMY;  Surgeon: Yolanda Barriga MD;  Location: [facility];  Service: OB/GYN;  Laterality: Bilateral;    TREATMENT OF CARDIAC ARRHYTHMIA N/A 2020    Procedure: CARDIOVERSION;  Surgeon: Gabriel Hawley MD;  Location: [facility] EP LAB;  Service: Cardiology;  Laterality: N/A;  af, demi, dccv, anes, mb, 345    TREATMENT OF CARDIAC ARRHYTHMIA  2022    Procedure: Cardioversion or Defibrillation;  Surgeon: Gabriel Hawley MD;  Location: [facility] EP LAB;  Service: Cardiology;;    WOUND DEBRIDEMENT Left 2020    Procedure: DEBRIDEMENT, WOUND;  Surgeon: SEMAJ Márquez III, MD;  Location: [facility] 2ND FLR;  Service: Peripheral Vascular;  Laterality: Left;     Family History   Problem Relation Age of Onset    Cataracts Mother     Hypertension Mother     Thyroid disease Mother     Diabetes Father     Hypertension Father     Hypertension Sister     Hypertension Brother     Amblyopia Neg Hx     Blindness Neg Hx     Cancer Neg Hx     Glaucoma Neg Hx     Macular degeneration Neg Hx     Retinal detachment Neg Hx     Strabismus Neg Hx     Stroke Neg Hx     Breast cancer Neg Hx     Colon cancer Neg Hx     Ovarian cancer Neg Hx      Social History     Tobacco Use    Smoking status: Former     Types: Cigarettes     Quit date: 2019     Years since quitting: 3.3    Smokeless tobacco: Never   Substance Use Topics    Alcohol use: No    Drug use: No     OB History    Para Term  AB Living   0 0 0 0 0 0   SAB IAB Ectopic Multiple Live Births   0 0 0 0 0       /72   Ht 5' 6" (1.676 " m)   Wt 123.9 kg (273 lb 2.4 oz)   LMP 03/25/2022 (Exact Date)   BMI 44.09 kg/m²     ROS:  GENERAL: No fever, chills, or weight loss.  +fatigue  VULVAR: No pain, no lesions and no itching.  VAGINAL: No relaxation, no itching, no discharge, no abnormal bleeding and no lesions.  ABDOMEN: No abdominal pain. Denies nausea. Denies vomiting. No diarrhea. No constipation  BREAST: Denies pain. No lumps. No discharge.  URINARY: No incontinence, no nocturia, no frequency and no dysuria.  CARDIOVASCULAR: No chest pain. No shortness of breath. No leg cramps.  NEUROLOGICAL: No headaches. No vision changes.    PE:   GEN:  NAD, A&O x 3  ABDOMEN:  Soft, non-tender, non-distended.  PELVIC: Normal external genitalia without lesions.  Normal hair distribution.  Adequate perineal body, normal urethral meatus.  Vagina moist and well rugated without lesions or discharge.  Cuff intact and healing well.  Cervix and uterus absent.  Pelvis without masses or tenderness.      ICD-10-CM ICD-9-CM    1. Post-operative state  Z98.890 V45.89       2. Liver masses  R16.0 573.8 Ambulatory referral/consult to Gastroenterology            Patient doing well.  Follow-up 1 year or prn

## 2022-10-31 ENCOUNTER — OFFICE VISIT (OUTPATIENT)
Dept: OBSTETRICS AND GYNECOLOGY | Facility: CLINIC | Age: 57
End: 2022-10-31
Attending: OBSTETRICS & GYNECOLOGY
Payer: COMMERCIAL

## 2022-10-31 ENCOUNTER — LAB VISIT (OUTPATIENT)
Dept: LAB | Facility: OTHER | Age: 57
End: 2022-10-31
Attending: NURSE PRACTITIONER
Payer: COMMERCIAL

## 2022-10-31 VITALS
HEIGHT: 66 IN | SYSTOLIC BLOOD PRESSURE: 134 MMHG | DIASTOLIC BLOOD PRESSURE: 72 MMHG | BODY MASS INDEX: 43.9 KG/M2 | WEIGHT: 273.13 LBS

## 2022-10-31 DIAGNOSIS — Z98.890 POST-OPERATIVE STATE: Primary | ICD-10-CM

## 2022-10-31 DIAGNOSIS — R16.0 LIVER MASSES: ICD-10-CM

## 2022-10-31 DIAGNOSIS — D64.9 ANEMIA, UNSPECIFIED TYPE: ICD-10-CM

## 2022-10-31 LAB
ANISOCYTOSIS BLD QL SMEAR: SLIGHT
BASOPHILS # BLD AUTO: 0.09 K/UL (ref 0–0.2)
BASOPHILS NFR BLD: 1.2 % (ref 0–1.9)
DIFFERENTIAL METHOD: ABNORMAL
EOSINOPHIL # BLD AUTO: 0.2 K/UL (ref 0–0.5)
EOSINOPHIL NFR BLD: 3.1 % (ref 0–8)
ERYTHROCYTE [DISTWIDTH] IN BLOOD BY AUTOMATED COUNT: 21.5 % (ref 11.5–14.5)
HCT VFR BLD AUTO: 33.7 % (ref 37–48.5)
HGB BLD-MCNC: 9.5 G/DL (ref 12–16)
HYPOCHROMIA BLD QL SMEAR: ABNORMAL
IMM GRANULOCYTES # BLD AUTO: 0.03 K/UL (ref 0–0.04)
IMM GRANULOCYTES NFR BLD AUTO: 0.4 % (ref 0–0.5)
LYMPHOCYTES # BLD AUTO: 1 K/UL (ref 1–4.8)
LYMPHOCYTES NFR BLD: 13.4 % (ref 18–48)
MCH RBC QN AUTO: 23.6 PG (ref 27–31)
MCHC RBC AUTO-ENTMCNC: 28.2 G/DL (ref 32–36)
MCV RBC AUTO: 84 FL (ref 82–98)
MONOCYTES # BLD AUTO: 0.8 K/UL (ref 0.3–1)
MONOCYTES NFR BLD: 11 % (ref 4–15)
NEUTROPHILS # BLD AUTO: 5.4 K/UL (ref 1.8–7.7)
NEUTROPHILS NFR BLD: 70.9 % (ref 38–73)
NRBC BLD-RTO: 0 /100 WBC
OVALOCYTES BLD QL SMEAR: ABNORMAL
PLATELET # BLD AUTO: 174 K/UL (ref 150–450)
PLATELET BLD QL SMEAR: ABNORMAL
PMV BLD AUTO: 11.5 FL (ref 9.2–12.9)
POIKILOCYTOSIS BLD QL SMEAR: SLIGHT
RBC # BLD AUTO: 4.02 M/UL (ref 4–5.4)
STOMATOCYTES BLD QL SMEAR: PRESENT
WBC # BLD AUTO: 7.67 K/UL (ref 3.9–12.7)

## 2022-10-31 PROCEDURE — 1159F MED LIST DOCD IN RCRD: CPT | Mod: CPTII,S$GLB,, | Performed by: OBSTETRICS & GYNECOLOGY

## 2022-10-31 PROCEDURE — 3075F SYST BP GE 130 - 139MM HG: CPT | Mod: CPTII,S$GLB,, | Performed by: OBSTETRICS & GYNECOLOGY

## 2022-10-31 PROCEDURE — 4010F ACE/ARB THERAPY RXD/TAKEN: CPT | Mod: CPTII,S$GLB,, | Performed by: OBSTETRICS & GYNECOLOGY

## 2022-10-31 PROCEDURE — 1160F RVW MEDS BY RX/DR IN RCRD: CPT | Mod: CPTII,S$GLB,, | Performed by: OBSTETRICS & GYNECOLOGY

## 2022-10-31 PROCEDURE — 1160F PR REVIEW ALL MEDS BY PRESCRIBER/CLIN PHARMACIST DOCUMENTED: ICD-10-PCS | Mod: CPTII,S$GLB,, | Performed by: OBSTETRICS & GYNECOLOGY

## 2022-10-31 PROCEDURE — 99024 POSTOP FOLLOW-UP VISIT: CPT | Mod: S$GLB,,, | Performed by: OBSTETRICS & GYNECOLOGY

## 2022-10-31 PROCEDURE — 3044F PR MOST RECENT HEMOGLOBIN A1C LEVEL <7.0%: ICD-10-PCS | Mod: CPTII,S$GLB,, | Performed by: OBSTETRICS & GYNECOLOGY

## 2022-10-31 PROCEDURE — 3078F PR MOST RECENT DIASTOLIC BLOOD PRESSURE < 80 MM HG: ICD-10-PCS | Mod: CPTII,S$GLB,, | Performed by: OBSTETRICS & GYNECOLOGY

## 2022-10-31 PROCEDURE — 99999 PR PBB SHADOW E&M-EST. PATIENT-LVL V: CPT | Mod: PBBFAC,,, | Performed by: OBSTETRICS & GYNECOLOGY

## 2022-10-31 PROCEDURE — 3044F HG A1C LEVEL LT 7.0%: CPT | Mod: CPTII,S$GLB,, | Performed by: OBSTETRICS & GYNECOLOGY

## 2022-10-31 PROCEDURE — 99999 PR PBB SHADOW E&M-EST. PATIENT-LVL V: ICD-10-PCS | Mod: PBBFAC,,, | Performed by: OBSTETRICS & GYNECOLOGY

## 2022-10-31 PROCEDURE — 3075F PR MOST RECENT SYSTOLIC BLOOD PRESS GE 130-139MM HG: ICD-10-PCS | Mod: CPTII,S$GLB,, | Performed by: OBSTETRICS & GYNECOLOGY

## 2022-10-31 PROCEDURE — 4010F PR ACE/ARB THEARPY RXD/TAKEN: ICD-10-PCS | Mod: CPTII,S$GLB,, | Performed by: OBSTETRICS & GYNECOLOGY

## 2022-10-31 PROCEDURE — 99024 PR POST-OP FOLLOW-UP VISIT: ICD-10-PCS | Mod: S$GLB,,, | Performed by: OBSTETRICS & GYNECOLOGY

## 2022-10-31 PROCEDURE — 3078F DIAST BP <80 MM HG: CPT | Mod: CPTII,S$GLB,, | Performed by: OBSTETRICS & GYNECOLOGY

## 2022-10-31 PROCEDURE — 85025 COMPLETE CBC W/AUTO DIFF WBC: CPT | Performed by: NURSE PRACTITIONER

## 2022-10-31 PROCEDURE — 1159F PR MEDICATION LIST DOCUMENTED IN MEDICAL RECORD: ICD-10-PCS | Mod: CPTII,S$GLB,, | Performed by: OBSTETRICS & GYNECOLOGY

## 2022-10-31 PROCEDURE — 36415 COLL VENOUS BLD VENIPUNCTURE: CPT | Performed by: NURSE PRACTITIONER

## 2022-11-09 NOTE — PROGRESS NOTES
"Subjective:           Chief Complaint: No chief complaint on file.      HPI    57 year old female with paroxysmal AFib s/p PVI, HTN, HLD, DM, mild AS, obesity here for follow-up.    She reports extreme fatigue, cannot complete ADLs and had to move in with her sister. Just walking to her bathroom makes her short of breath. Previously had 10% PVC burden.    She has occasional, fleeting chest pain. She denies orthopnea but has occasional PND, peripheral edema, palpitations "all the time", denies syncope.    EKG today reveals NSR with PACs, PVCs, RBBB    Review of Systems   Constitutional:  Negative for fever.   HENT:  Positive for nosebleeds.    Eyes:  Negative for visual disturbance.   Respiratory:  Positive for shortness of breath.    Cardiovascular:  Positive for palpitations.   Gastrointestinal:  Negative for blood in stool.   Genitourinary:  Negative for hematuria.   Musculoskeletal:  Positive for gait problem.   Integumentary:  Negative for rash.   Neurological:  Negative for syncope.       Objective:      Vitals:    11/14/22 1352   BP: (!) 117/58   Pulse: 67       Physical Exam  Constitutional:       Appearance: She is well-developed. She is not diaphoretic.   HENT:      Head: Normocephalic and atraumatic.   Eyes:      Pupils: Pupils are equal, round, and reactive to light.   Neck:      Vascular: No carotid bruit or JVD.   Cardiovascular:      Rate and Rhythm: Normal rate and regular rhythm.      Heart sounds: Normal heart sounds. Heart sounds not distant. No murmur heard.    No friction rub. No gallop. No S3 or S4 sounds.   Pulmonary:      Effort: Pulmonary effort is normal. No respiratory distress.      Breath sounds: Normal breath sounds. No wheezing.   Abdominal:      General: Bowel sounds are normal. There is no distension.      Palpations: Abdomen is soft.      Tenderness: There is no abdominal tenderness.   Musculoskeletal:         General: Swelling (trace bilateral lower extremity edema) present.      " Cervical back: Normal range of motion.   Skin:     General: Skin is warm.      Findings: No erythema.   Neurological:      Mental Status: She is alert and oriented to person, place, and time.   Psychiatric:         Behavior: Behavior normal.       Assessment & Plan     Atrial Fibrillation (paroxysmal)  Managed by EP  CHADS VASc 3  HAS BLED 1  Taking apixaban, metoprolol  Previously had 10% PVC burden, will repeat 24 hour Holter    Essential hypertension  117/58  Continue metoprolol, lisinopril, nifedipine    Pure hypercholesterolemia  Continue statin    The 10-year ASCVD risk score (Devendra TENORIO, et al., 2019) is: 6.5%    Values used to calculate the score:      Age: 57 years      Sex: Female      Is Non- : No      Diabetic: Yes      Tobacco smoker: No      Systolic Blood Pressure: 134 mmHg      Is BP treated: Yes      HDL Cholesterol: 53 mg/dL      Total Cholesterol: 164 mg/dL      Type 2 diabetes mellitus with diabetic polyneuropathy, without long-term current use of insulin  Managed by PCP    ERENDIRA (obstructive sleep apnea)  Continue CPAP    Morbid obesity  We discussed diet and lifestyle modification. She lost 60 lbs with diet changes    Mild aortic stenosis  TTE every 3-5 years ([id number])

## 2022-11-14 ENCOUNTER — HOSPITAL ENCOUNTER (OUTPATIENT)
Dept: CARDIOLOGY | Facility: CLINIC | Age: 57
Discharge: HOME | End: 2022-11-14
Payer: COMMERCIAL

## 2022-11-14 ENCOUNTER — OFFICE VISIT (OUTPATIENT)
Dept: CARDIOLOGY | Facility: CLINIC | Age: 57
End: 2022-11-14
Payer: COMMERCIAL

## 2022-11-14 VITALS
WEIGHT: 286.81 LBS | HEIGHT: 66 IN | SYSTOLIC BLOOD PRESSURE: 117 MMHG | HEART RATE: 67 BPM | DIASTOLIC BLOOD PRESSURE: 58 MMHG | OXYGEN SATURATION: 99 % | BODY MASS INDEX: 46.09 KG/M2

## 2022-11-14 DIAGNOSIS — G47.33 OSA (OBSTRUCTIVE SLEEP APNEA): ICD-10-CM

## 2022-11-14 DIAGNOSIS — I35.0 MILD AORTIC STENOSIS: ICD-10-CM

## 2022-11-14 DIAGNOSIS — I48.0 PAF (PAROXYSMAL ATRIAL FIBRILLATION): Primary | ICD-10-CM

## 2022-11-14 DIAGNOSIS — I10 ESSENTIAL HYPERTENSION: ICD-10-CM

## 2022-11-14 DIAGNOSIS — E78.00 PURE HYPERCHOLESTEROLEMIA: ICD-10-CM

## 2022-11-14 DIAGNOSIS — I48.91 ATRIAL FIBRILLATION, UNSPECIFIED TYPE: ICD-10-CM

## 2022-11-14 DIAGNOSIS — E11.42 TYPE 2 DIABETES MELLITUS WITH DIABETIC POLYNEUROPATHY, WITHOUT LONG-TERM CURRENT USE OF INSULIN: Chronic | ICD-10-CM

## 2022-11-14 DIAGNOSIS — I49.3 PVC (PREMATURE VENTRICULAR CONTRACTION): ICD-10-CM

## 2022-11-14 DIAGNOSIS — E66.01 MORBID OBESITY: ICD-10-CM

## 2022-11-14 PROCEDURE — 99214 OFFICE O/P EST MOD 30 MIN: CPT | Mod: S$GLB,,, | Performed by: INTERNAL MEDICINE

## 2022-11-14 PROCEDURE — 3044F PR MOST RECENT HEMOGLOBIN A1C LEVEL <7.0%: ICD-10-PCS | Mod: CPTII,S$GLB,, | Performed by: INTERNAL MEDICINE

## 2022-11-14 PROCEDURE — 93005 EKG 12-LEAD: ICD-10-PCS | Mod: S$GLB,,, | Performed by: INTERNAL MEDICINE

## 2022-11-14 PROCEDURE — 93010 ELECTROCARDIOGRAM REPORT: CPT | Mod: S$GLB,,, | Performed by: INTERNAL MEDICINE

## 2022-11-14 PROCEDURE — 3008F BODY MASS INDEX DOCD: CPT | Mod: CPTII,S$GLB,, | Performed by: INTERNAL MEDICINE

## 2022-11-14 PROCEDURE — 93010 EKG 12-LEAD: ICD-10-PCS | Mod: S$GLB,,, | Performed by: INTERNAL MEDICINE

## 2022-11-14 PROCEDURE — 4010F PR ACE/ARB THEARPY RXD/TAKEN: ICD-10-PCS | Mod: CPTII,S$GLB,, | Performed by: INTERNAL MEDICINE

## 2022-11-14 PROCEDURE — 1160F RVW MEDS BY RX/DR IN RCRD: CPT | Mod: CPTII,S$GLB,, | Performed by: INTERNAL MEDICINE

## 2022-11-14 PROCEDURE — 1159F MED LIST DOCD IN RCRD: CPT | Mod: CPTII,S$GLB,, | Performed by: INTERNAL MEDICINE

## 2022-11-14 PROCEDURE — 93005 ELECTROCARDIOGRAM TRACING: CPT | Mod: S$GLB,,, | Performed by: INTERNAL MEDICINE

## 2022-11-14 PROCEDURE — 3078F DIAST BP <80 MM HG: CPT | Mod: CPTII,S$GLB,, | Performed by: INTERNAL MEDICINE

## 2022-11-14 PROCEDURE — 3044F HG A1C LEVEL LT 7.0%: CPT | Mod: CPTII,S$GLB,, | Performed by: INTERNAL MEDICINE

## 2022-11-14 PROCEDURE — 4010F ACE/ARB THERAPY RXD/TAKEN: CPT | Mod: CPTII,S$GLB,, | Performed by: INTERNAL MEDICINE

## 2022-11-14 PROCEDURE — 99999 PR PBB SHADOW E&M-EST. PATIENT-LVL V: ICD-10-PCS | Mod: PBBFAC,,, | Performed by: INTERNAL MEDICINE

## 2022-11-14 PROCEDURE — 1160F PR REVIEW ALL MEDS BY PRESCRIBER/CLIN PHARMACIST DOCUMENTED: ICD-10-PCS | Mod: CPTII,S$GLB,, | Performed by: INTERNAL MEDICINE

## 2022-11-14 PROCEDURE — 3074F SYST BP LT 130 MM HG: CPT | Mod: CPTII,S$GLB,, | Performed by: INTERNAL MEDICINE

## 2022-11-14 PROCEDURE — 3074F PR MOST RECENT SYSTOLIC BLOOD PRESSURE < 130 MM HG: ICD-10-PCS | Mod: CPTII,S$GLB,, | Performed by: INTERNAL MEDICINE

## 2022-11-14 PROCEDURE — 99214 PR OFFICE/OUTPT VISIT, EST, LEVL IV, 30-39 MIN: ICD-10-PCS | Mod: S$GLB,,, | Performed by: INTERNAL MEDICINE

## 2022-11-14 PROCEDURE — 3008F PR BODY MASS INDEX (BMI) DOCUMENTED: ICD-10-PCS | Mod: CPTII,S$GLB,, | Performed by: INTERNAL MEDICINE

## 2022-11-14 PROCEDURE — 99999 PR PBB SHADOW E&M-EST. PATIENT-LVL V: CPT | Mod: PBBFAC,,, | Performed by: INTERNAL MEDICINE

## 2022-11-14 PROCEDURE — 3078F PR MOST RECENT DIASTOLIC BLOOD PRESSURE < 80 MM HG: ICD-10-PCS | Mod: CPTII,S$GLB,, | Performed by: INTERNAL MEDICINE

## 2022-11-14 PROCEDURE — 1159F PR MEDICATION LIST DOCUMENTED IN MEDICAL RECORD: ICD-10-PCS | Mod: CPTII,S$GLB,, | Performed by: INTERNAL MEDICINE

## 2022-11-23 ENCOUNTER — PATIENT MESSAGE (OUTPATIENT)
Dept: ELECTROPHYSIOLOGY | Facility: CLINIC | Age: 57
End: 2022-11-23
Payer: COMMERCIAL

## 2022-11-28 ENCOUNTER — HOSPITAL ENCOUNTER (OUTPATIENT)
Dept: CARDIOLOGY | Facility: HOSPITAL | Age: 57
Discharge: HOME | End: 2022-11-28
Attending: INTERNAL MEDICINE
Payer: COMMERCIAL

## 2022-11-28 DIAGNOSIS — I49.3 PVC (PREMATURE VENTRICULAR CONTRACTION): ICD-10-CM

## 2022-11-28 PROCEDURE — 93226 XTRNL ECG REC<48 HR SCAN A/R: CPT

## 2022-11-28 PROCEDURE — 93227 HOLTER MONITOR - 24 HOUR (CUPID ONLY): ICD-10-PCS | Mod: ,,, | Performed by: INTERNAL MEDICINE

## 2022-11-28 PROCEDURE — 93227 XTRNL ECG REC<48 HR R&I: CPT | Mod: ,,, | Performed by: INTERNAL MEDICINE

## 2022-11-30 ENCOUNTER — PATIENT MESSAGE (OUTPATIENT)
Dept: CARDIOLOGY | Facility: CLINIC | Age: 57
End: 2022-11-30
Payer: COMMERCIAL

## 2022-12-01 LAB
OHS CV EVENT MONITOR DAY: 0
OHS CV HOLTER LENGTH DECIMAL HOURS: 24
OHS CV HOLTER LENGTH HOURS: 24
OHS CV HOLTER LENGTH MINUTES: 0
OHS CV HOLTER SINUS AVERAGE HR: 76
OHS CV HOLTER SINUS MAX HR: 121
OHS CV HOLTER SINUS MIN HR: 56

## 2022-12-05 ENCOUNTER — PATIENT MESSAGE (OUTPATIENT)
Dept: CARDIOLOGY | Facility: CLINIC | Age: 57
End: 2022-12-05
Payer: COMMERCIAL

## 2022-12-05 DIAGNOSIS — R06.02 SHORTNESS OF BREATH: Primary | ICD-10-CM

## 2022-12-05 NOTE — PROGRESS NOTES
Unable to reach by phone, will send message through portal. No significant arrhythmias to explain her severe shortness of breath. Schedule PET stress.

## 2022-12-19 ENCOUNTER — PATIENT MESSAGE (OUTPATIENT)
Dept: ELECTROPHYSIOLOGY | Facility: CLINIC | Age: 57
End: 2022-12-19
Payer: COMMERCIAL

## 2022-12-19 DIAGNOSIS — I48.0 PAF (PAROXYSMAL ATRIAL FIBRILLATION): Primary | ICD-10-CM

## 2022-12-19 RX ORDER — PROPAFENONE HYDROCHLORIDE 225 MG/1
225 TABLET, FILM COATED ORAL EVERY 8 HOURS
Qty: 90 TABLET | Refills: 11 | Status: SHIPPED | OUTPATIENT
Start: 2022-12-19 | End: 2023-01-11 | Stop reason: SDUPTHER

## 2022-12-22 ENCOUNTER — TELEPHONE (OUTPATIENT)
Dept: GASTROENTEROLOGY | Facility: CLINIC | Age: 57
End: 2022-12-22
Payer: COMMERCIAL

## 2022-12-22 NOTE — TELEPHONE ENCOUNTER
Spoke to pt to informed that she needs to see hepatologist instead of GI. Verbalized understanding.

## 2023-01-12 ENCOUNTER — HOSPITAL ENCOUNTER (OUTPATIENT)
Dept: CARDIOLOGY | Facility: HOSPITAL | Age: 58
Discharge: HOME | End: 2023-01-12
Attending: INTERNAL MEDICINE
Payer: COMMERCIAL

## 2023-01-12 VITALS
HEIGHT: 66 IN | SYSTOLIC BLOOD PRESSURE: 124 MMHG | RESPIRATION RATE: 16 BRPM | BODY MASS INDEX: 45.96 KG/M2 | HEART RATE: 72 BPM | DIASTOLIC BLOOD PRESSURE: 59 MMHG | WEIGHT: 286 LBS

## 2023-01-12 DIAGNOSIS — R06.02 SHORTNESS OF BREATH: ICD-10-CM

## 2023-01-12 LAB
CFR FLOW - ANTERIOR: 1.55
CFR FLOW - INFERIOR: 1.56
CFR FLOW - LATERAL: 1.66
CFR FLOW - MAX: 1.89
CFR FLOW - MIN: 1.23
CFR FLOW - SEPTAL: 1.51
CFR FLOW - WHOLE HEART: 1.57
CFR FLOW- DEFECT 1: 1.46
CV STRESS BASE HR: 77 BPM
DIASTOLIC BLOOD PRESSURE: 79 MMHG
EJECTION FRACTION- HIGH: 59 %
END DIASTOLIC INDEX-HIGH: 155 ML/M2
END DIASTOLIC INDEX-LOW: 91 ML/M2
END SYSTOLIC INDEX-HIGH: 78 ML/M2
END SYSTOLIC INDEX-LOW: 40 ML/M2
NUC REST DIASTOLIC VOLUME INDEX: 151
NUC REST EJECTION FRACTION: 53
NUC REST SYSTOLIC VOLUME INDEX: 71
NUC STRESS DIASTOLIC VOLUME INDEX: 168
NUC STRESS EJECTION FRACTION: 57 %
NUC STRESS SYSTOLIC VOLUME INDEX: 73
OHS CV CPX 1 MINUTE RECOVERY HEART RATE: 75 BPM
OHS CV CPX 85 PERCENT MAX PREDICTED HEART RATE MALE: 132
OHS CV CPX MAX PREDICTED HEART RATE: 155
OHS CV CPX PATIENT IS FEMALE: 1
OHS CV CPX PATIENT IS MALE: 0
OHS CV CPX PEAK DIASTOLIC BLOOD PRESSURE: 56 MMHG
OHS CV CPX PEAK HEAR RATE: 67 BPM
OHS CV CPX PEAK RATE PRESSURE PRODUCT: 8442
OHS CV CPX PEAK SYSTOLIC BLOOD PRESSURE: 126 MMHG
OHS CV CPX PERCENT MAX PREDICTED HEART RATE ACHIEVED: 43
OHS CV CPX RATE PRESSURE PRODUCT PRESENTING: NORMAL
PERFUSION DEFECT 1 SIZE IN %: 7 %
PERFUSION DEFECT SIZE WORSENS % 1: 13 %
REST FLOW - ANTERIOR: 0.69 CC/MIN/G
REST FLOW - INFERIOR: 0.7 CC/MIN/G
REST FLOW - LATERAL: 0.71 CC/MIN/G
REST FLOW - MAX: 1.05 CC/MIN/G
REST FLOW - MIN: 0.28 CC/MIN/G
REST FLOW - SEPTAL: 0.52 CC/MIN/G
REST FLOW - WHOLE HEART: 0.66 CC/MIN/G
REST FLOW- DEFECT 1: 0.44 CC/MIN/G
RETIRED EF AND QEF - SEE NOTES: 47 %
STRESS FLOW - ANTERIOR: 1.07 CC/MIN/G
STRESS FLOW - INFERIOR: 1.1 CC/MIN/G
STRESS FLOW - LATERAL: 1.17 CC/MIN/G
STRESS FLOW - MAX: 1.72 CC/MIN/G
STRESS FLOW - MIN: 0.39 CC/MIN/G
STRESS FLOW - SEPTAL: 0.79 CC/MIN/G
STRESS FLOW - WHOLE HEART: 1.03 CC/MIN/G
STRESS FLOW- DEFECT 1: 0.65 CC/MIN/G
SYSTOLIC BLOOD PRESSURE: 131 MMHG

## 2023-01-12 PROCEDURE — 78431 CARDIAC PET SCAN STRESS (CUPID ONLY): ICD-10-PCS | Mod: 26,,, | Performed by: INTERNAL MEDICINE

## 2023-01-12 PROCEDURE — 78434 CARDIAC PET SCAN STRESS (CUPID ONLY): ICD-10-PCS | Mod: 26,,, | Performed by: INTERNAL MEDICINE

## 2023-01-12 PROCEDURE — 93016 CARDIAC PET SCAN STRESS (CUPID ONLY): ICD-10-PCS | Mod: ,,, | Performed by: INTERNAL MEDICINE

## 2023-01-12 PROCEDURE — 78434 AQMBF PET REST & RX STRESS: CPT

## 2023-01-12 PROCEDURE — 93018 CV STRESS TEST I&R ONLY: CPT | Mod: ,,, | Performed by: INTERNAL MEDICINE

## 2023-01-12 PROCEDURE — 63600175 PHARM REV CODE 636 W HCPCS: Performed by: INTERNAL MEDICINE

## 2023-01-12 PROCEDURE — 78434 AQMBF PET REST & RX STRESS: CPT | Mod: 26,,, | Performed by: INTERNAL MEDICINE

## 2023-01-12 PROCEDURE — 78431 MYOCRD IMG PET RST&STRS CT: CPT | Mod: 26,,, | Performed by: INTERNAL MEDICINE

## 2023-01-12 PROCEDURE — 93018 CARDIAC PET SCAN STRESS (CUPID ONLY): ICD-10-PCS | Mod: ,,, | Performed by: INTERNAL MEDICINE

## 2023-01-12 PROCEDURE — 93016 CV STRESS TEST SUPVJ ONLY: CPT | Mod: ,,, | Performed by: INTERNAL MEDICINE

## 2023-01-12 RX ORDER — CAFFEINE CITRATE 20 MG/ML
60 SOLUTION INTRAVENOUS ONCE
Status: COMPLETED | OUTPATIENT
Start: 2023-01-12 | End: 2023-01-12

## 2023-01-12 RX ORDER — REGADENOSON 0.08 MG/ML
0.4 INJECTION, SOLUTION INTRAVENOUS ONCE
Status: COMPLETED | OUTPATIENT
Start: 2023-01-12 | End: 2023-01-12

## 2023-01-12 RX ADMIN — REGADENOSON 0.4 MG: 0.08 INJECTION, SOLUTION INTRAVENOUS at 10:01

## 2023-01-12 RX ADMIN — CAFFEINE CITRATE 60 MG: 20 INJECTION INTRAVENOUS at 10:01

## 2023-01-18 DIAGNOSIS — R94.39 ABNORMAL STRESS TEST: Primary | ICD-10-CM

## 2023-01-18 NOTE — PROGRESS NOTES
Abnormal PET stress. Mother had 3v CABG, sister had premature CAD. She has multiple other risk factors and shortness of breath and fatigue. Referral sent to interventional cardiology to consider coronary angiography.    Cory Kilgore MD

## 2023-01-19 ENCOUNTER — OUTPATIENT CASE MANAGEMENT (OUTPATIENT)
Dept: ADMINISTRATIVE | Facility: OTHER | Age: 58
End: 2023-01-19
Payer: COMMERCIAL

## 2023-01-19 DIAGNOSIS — R94.39 ABNORMAL STRESS TEST: Primary | ICD-10-CM

## 2023-01-19 RX ORDER — SODIUM CHLORIDE 0.9 % (FLUSH) 0.9 %
10 SYRINGE (ML) INJECTION
Status: DISCONTINUED | OUTPATIENT
Start: 2023-01-19 | End: 2023-01-19 | Stop reason: HOSPADM

## 2023-01-19 RX ORDER — SODIUM CHLORIDE 9 MG/ML
INJECTION, SOLUTION INTRAVENOUS CONTINUOUS
Status: CANCELLED | OUTPATIENT
Start: 2023-01-19 | End: 2023-01-19

## 2023-01-19 RX ORDER — DIPHENHYDRAMINE HCL 50 MG
50 CAPSULE ORAL ONCE
Status: CANCELLED | OUTPATIENT
Start: 2023-01-19 | End: 2023-01-19

## 2023-01-19 NOTE — PROGRESS NOTES
Outpatient Care Management  Plan of Care Follow Up Visit    Patient: Vicky Alvarado  MRN: [id number]  Date of Service: 01/20/2023  Completed by: Rosalina Herr RN  Referral Date: 06/17/2022    Reason for Visit   Patient presents with    OPCM Chart Review     1/19/23    OPCM RN First Follow-Up Attempt     1/19/23    Update Plan Of Care     1/20/23       Brief Summary: Pt reports that she has been having issues with SOB, fatigue, and swelling to BLE. Pt reports she cannot perform activities such as walking from her bed to the bathroom, or loading the  without becoming fatigued or having SOB and has to sit down to rest. Pt reports she had appt with cardiology and stress test done which had an abnormal result; pt was advised she needs heart stents. Pt has upcoming appt with cardiology again in 3 days.  Advised pt that if SOB increases that she needs to go to the ER, pt agreed. Pt reports she has swelling to right lower leg but admits she does not wear compression stockings or weigh self daily. Pt reports she does elevate BLE as much as possible. Discussed retailers of compression stockings with pt such as [employer] and Monitor, pt agrees to look into this. Pt agrees to weigh self daily and record readings, mailed weight log to pt. Pt reports she is taking lasix as ordered. Pt reports she fell on yesterday. Pt reports she turned around too fast, lost balance, and fell on her left side. Pt reports she did not injure herself but she's sore on the left side of her body; advised pt that if soreness or pain increases to go to the ED, pt agreed. Reached out to PCP on pt's behalf for rollator. Pt reports that she's able to Pt would like rollator to use in the home to assist with prevention falls.  Next Steps: F/U concerning fluid retention, daily weights, and SOB    
16

## 2023-01-20 ENCOUNTER — TELEPHONE (OUTPATIENT)
Dept: CARDIOLOGY | Facility: CLINIC | Age: 58
End: 2023-01-20
Payer: COMMERCIAL

## 2023-01-20 ENCOUNTER — PATIENT MESSAGE (OUTPATIENT)
Dept: ADMINISTRATIVE | Facility: OTHER | Age: 58
End: 2023-01-20
Payer: COMMERCIAL

## 2023-01-20 NOTE — TELEPHONE ENCOUNTER
Returned phone call.  No answer.  Left message on VM with return number and confirmed patient is scheduled to see Dr. Terry 01/23/22

## 2023-01-23 ENCOUNTER — EDUCATION (OUTPATIENT)
Dept: CARDIOLOGY | Facility: CLINIC | Age: 58
End: 2023-01-23
Payer: COMMERCIAL

## 2023-01-23 ENCOUNTER — OFFICE VISIT (OUTPATIENT)
Dept: CARDIOLOGY | Facility: CLINIC | Age: 58
End: 2023-01-23
Payer: COMMERCIAL

## 2023-01-23 ENCOUNTER — PATIENT MESSAGE (OUTPATIENT)
Dept: MEDSURG UNIT | Facility: HOSPITAL | Age: 58
End: 2023-01-23
Payer: COMMERCIAL

## 2023-01-23 ENCOUNTER — LAB VISIT (OUTPATIENT)
Dept: LAB | Facility: HOSPITAL | Age: 58
End: 2023-01-23
Attending: INTERNAL MEDICINE
Payer: COMMERCIAL

## 2023-01-23 VITALS
WEIGHT: 280.88 LBS | BODY MASS INDEX: 45.14 KG/M2 | OXYGEN SATURATION: 99 % | DIASTOLIC BLOOD PRESSURE: 72 MMHG | HEIGHT: 66 IN | HEART RATE: 66 BPM | SYSTOLIC BLOOD PRESSURE: 165 MMHG

## 2023-01-23 DIAGNOSIS — R94.39 ABNORMAL NUCLEAR STRESS TEST: ICD-10-CM

## 2023-01-23 DIAGNOSIS — R06.02 SHORTNESS OF BREATH: ICD-10-CM

## 2023-01-23 DIAGNOSIS — E78.00 PURE HYPERCHOLESTEROLEMIA: ICD-10-CM

## 2023-01-23 DIAGNOSIS — I35.0 MILD AORTIC STENOSIS: Chronic | ICD-10-CM

## 2023-01-23 DIAGNOSIS — I10 ESSENTIAL HYPERTENSION: ICD-10-CM

## 2023-01-23 DIAGNOSIS — I35.0 MILD AORTIC STENOSIS: Primary | Chronic | ICD-10-CM

## 2023-01-23 DIAGNOSIS — R94.39 ABNORMAL STRESS TEST: ICD-10-CM

## 2023-01-23 DIAGNOSIS — I48.0 PAF (PAROXYSMAL ATRIAL FIBRILLATION): ICD-10-CM

## 2023-01-23 DIAGNOSIS — E66.01 MORBID OBESITY WITH BMI OF 45.0-49.9, ADULT: ICD-10-CM

## 2023-01-23 DIAGNOSIS — R94.39 ABNORMAL NUCLEAR STRESS TEST: Primary | ICD-10-CM

## 2023-01-23 DIAGNOSIS — E11.42 TYPE 2 DIABETES MELLITUS WITH DIABETIC POLYNEUROPATHY, WITHOUT LONG-TERM CURRENT USE OF INSULIN: Chronic | ICD-10-CM

## 2023-01-23 LAB
ANION GAP SERPL CALC-SCNC: 11 MMOL/L (ref 8–16)
APTT BLDCRRT: 26.5 SEC (ref 21–32)
BUN SERPL-MCNC: 10 MG/DL (ref 6–20)
CALCIUM SERPL-MCNC: 9.5 MG/DL (ref 8.7–10.5)
CHLORIDE SERPL-SCNC: 103 MMOL/L (ref 95–110)
CO2 SERPL-SCNC: 29 MMOL/L (ref 23–29)
CREAT SERPL-MCNC: 0.9 MG/DL (ref 0.5–1.4)
ERYTHROCYTE [DISTWIDTH] IN BLOOD BY AUTOMATED COUNT: 19.9 % (ref 11.5–14.5)
EST. GFR  (NO RACE VARIABLE): >60 ML/MIN/1.73 M^2
GLUCOSE SERPL-MCNC: 110 MG/DL (ref 70–110)
HCT VFR BLD AUTO: 31.7 % (ref 37–48.5)
HGB BLD-MCNC: 8.3 G/DL (ref 12–16)
INR PPP: 1.1 (ref 0.8–1.2)
MCH RBC QN AUTO: 21.9 PG (ref 27–31)
MCHC RBC AUTO-ENTMCNC: 26.2 G/DL (ref 32–36)
MCV RBC AUTO: 84 FL (ref 82–98)
PLATELET # BLD AUTO: 181 K/UL (ref 150–450)
PMV BLD AUTO: 11 FL (ref 9.2–12.9)
POTASSIUM SERPL-SCNC: 3.4 MMOL/L (ref 3.5–5.1)
PROTHROMBIN TIME: 11.8 SEC (ref 9–12.5)
RBC # BLD AUTO: 3.79 M/UL (ref 4–5.4)
SODIUM SERPL-SCNC: 143 MMOL/L (ref 136–145)
WBC # BLD AUTO: 6.85 K/UL (ref 3.9–12.7)

## 2023-01-23 PROCEDURE — 3078F PR MOST RECENT DIASTOLIC BLOOD PRESSURE < 80 MM HG: ICD-10-PCS | Mod: CPTII,S$GLB,, | Performed by: INTERNAL MEDICINE

## 2023-01-23 PROCEDURE — 3077F PR MOST RECENT SYSTOLIC BLOOD PRESSURE >= 140 MM HG: ICD-10-PCS | Mod: CPTII,S$GLB,, | Performed by: INTERNAL MEDICINE

## 2023-01-23 PROCEDURE — 3077F SYST BP >= 140 MM HG: CPT | Mod: CPTII,S$GLB,, | Performed by: INTERNAL MEDICINE

## 2023-01-23 PROCEDURE — 1159F MED LIST DOCD IN RCRD: CPT | Mod: CPTII,S$GLB,, | Performed by: INTERNAL MEDICINE

## 2023-01-23 PROCEDURE — 99999 PR PBB SHADOW E&M-EST. PATIENT-LVL V: CPT | Mod: PBBFAC,,, | Performed by: INTERNAL MEDICINE

## 2023-01-23 PROCEDURE — 3008F PR BODY MASS INDEX (BMI) DOCUMENTED: ICD-10-PCS | Mod: CPTII,S$GLB,, | Performed by: INTERNAL MEDICINE

## 2023-01-23 PROCEDURE — 85730 THROMBOPLASTIN TIME PARTIAL: CPT | Performed by: INTERNAL MEDICINE

## 2023-01-23 PROCEDURE — 99204 PR OFFICE/OUTPT VISIT, NEW, LEVL IV, 45-59 MIN: ICD-10-PCS | Mod: S$GLB,,, | Performed by: INTERNAL MEDICINE

## 2023-01-23 PROCEDURE — 80048 BASIC METABOLIC PNL TOTAL CA: CPT | Performed by: INTERNAL MEDICINE

## 2023-01-23 PROCEDURE — 85027 COMPLETE CBC AUTOMATED: CPT | Performed by: INTERNAL MEDICINE

## 2023-01-23 PROCEDURE — 85610 PROTHROMBIN TIME: CPT | Performed by: INTERNAL MEDICINE

## 2023-01-23 PROCEDURE — 99204 OFFICE O/P NEW MOD 45 MIN: CPT | Mod: S$GLB,,, | Performed by: INTERNAL MEDICINE

## 2023-01-23 PROCEDURE — 3072F LOW RISK FOR RETINOPATHY: CPT | Mod: CPTII,S$GLB,, | Performed by: INTERNAL MEDICINE

## 2023-01-23 PROCEDURE — 99999 PR PBB SHADOW E&M-EST. PATIENT-LVL V: ICD-10-PCS | Mod: PBBFAC,,, | Performed by: INTERNAL MEDICINE

## 2023-01-23 PROCEDURE — 3008F BODY MASS INDEX DOCD: CPT | Mod: CPTII,S$GLB,, | Performed by: INTERNAL MEDICINE

## 2023-01-23 PROCEDURE — 3072F PR LOW RISK FOR RETINOPATHY: ICD-10-PCS | Mod: CPTII,S$GLB,, | Performed by: INTERNAL MEDICINE

## 2023-01-23 PROCEDURE — 3078F DIAST BP <80 MM HG: CPT | Mod: CPTII,S$GLB,, | Performed by: INTERNAL MEDICINE

## 2023-01-23 PROCEDURE — 36415 COLL VENOUS BLD VENIPUNCTURE: CPT | Performed by: INTERNAL MEDICINE

## 2023-01-23 PROCEDURE — 1159F PR MEDICATION LIST DOCUMENTED IN MEDICAL RECORD: ICD-10-PCS | Mod: CPTII,S$GLB,, | Performed by: INTERNAL MEDICINE

## 2023-01-23 RX ORDER — ASPIRIN 81 MG/1
81 TABLET ORAL DAILY
Qty: 90 TABLET | Refills: 3 | Status: SHIPPED | OUTPATIENT
Start: 2023-01-23 | End: 2023-03-16

## 2023-01-23 RX ORDER — CLOPIDOGREL BISULFATE 75 MG/1
75 TABLET ORAL DAILY
Qty: 90 TABLET | Refills: 3 | Status: ON HOLD | OUTPATIENT
Start: 2023-01-23 | End: 2023-02-07 | Stop reason: HOSPADM

## 2023-01-23 NOTE — ASSESSMENT & PLAN NOTE
--[facility] +/- PCI, patient is a LIZ candidate  - Anti-platelet Therapy: ASA / Plavix; starting maintenance doses today; will need 300 Plavix x1 day of angiogram and PRU  - Access: Right radial (sofiya's test normal)  - Catheters: JL5, JR4  - Creatinine/CrCl: 1.1 (8/2022)  - Allergies: No shellfish / Iodine allergy  - Pre-Hydration: NS  - Pre-Op Med: Bendaryl 50mg pO   - All patient's questions were answered.  -The risks, benefits and alternatives of the procedure were explained to the patient.   -The risks of coronary angiography include but are not limited to: bleeding, infection, heart rhythm abnormalities, allergic reactions, kidney injury and potential need for dialysis, stroke and death.   - Should stenting be indicated, the patient has agreed to dual anti-platelet therapy for 1-consecutive year with a drug-eluting stent and a minimum of 1-month with the use of a bare metal stent  - Additionally, pt is aware that non-compliance is likely to result in stent clotting with heart attack, heart failure, and/or death  -The risks of moderate sedation include hypotension, respiratory depression, arrhythmias, bronchospasm, and death.   - Informed consent was obtained and the patient is agreeable to proceed with the procedure.

## 2023-01-23 NOTE — H&P (VIEW-ONLY)
Interventional Cardiology Clinic Note  Reason for Visit: Abnormal PET Stress  Referring Physician: Dr Cory Kilgore    HPI:   Mrs Alvarado is a 57 year old female with paroxysmal AFib s/p PVI, HTN, HLD, DM, mild AS, obesity referred for an abnormal stress test.    She was seen by Dr Kilgore in October for fatigue and NAPIER. Holter showed no recurrence of arrhythmia and PET stress was abnormal showing a reversible defect in the mid-apical anteroseptum.    Today, she continues to feel fatigued and SOB. She has significant NAPIER with ADLs around the house (sweeping, mopping, etc). Walkin around the house also causes her NAPIER that resolves quickly with rest. She does have twinges of chest discomfort randomly with exertion, but nothing more substantial than that. No leg swelling weight gain, orthopnea, PND. No bleeding recently while on Eliquis. She has lost 40 lbs in the past year.    Of note, she had 2 episodes of severe blood loss anemia. After her ablation with Dr Hawley in 2020 she had a L CFA injury requiring multiple pRBCs and vascular surgery intervention. No more sticks to L CFA. She also have severe vaginal bleeding that needed D&C and hysterectomy 8/2022. She remains on Eliquis now and no other bleeding. Hb 9.5 from 2 months ago.     ROS:    Constitution: Negative for fever, chills, weight gain. Positive fatigue and weight loss.   HENT: Negative for sore throat, rhinorrhea, or headache.  Eyes: Negative for blurred or double vision.   Cardiovascular: See above  Pulmonary: Positive for SOB/NAPIER.  Gastrointestinal: Negative for abdominal pain, nausea, vomiting, or diarrhea.   : Negative for dysuria.   Neurological: Negative for focal weakness or sensory changes.  PMH:     Past Medical History:   Diagnosis Date    Anticoagulant long-term use     Arthritis     Atrial fibrillation     cardioversion    Atrial fibrillation with RVR     Avascular necrosis     L hand    CHF (congestive heart failure)     Depression      Encounter for blood transfusion 07/22/2020    Encounter for blood transfusion 03/2022    GERD (gastroesophageal reflux disease)     Hx of psychiatric care     Hypertension     Iron deficiency anemia 5/7/2022    TIBC 444 with saturated iron 8    Obese     Pre-diabetes 5/7/2022    Psychiatric problem     Sleep apnea     wears cpap     Past Surgical History:   Procedure Laterality Date    ABLATION OF ARRHYTHMOGENIC FOCUS FOR ATRIAL FIBRILLATION N/A 7/20/2020    Procedure: Ablation atrial fibrillation;  Surgeon: Gabriel aHwley MD;  Location: [facility] EP LAB;  Service: Cardiology;  Laterality: N/A;  afib, PVI, RFA, REENA, SHADY, anes, MB, 3 Prep    ABLATION OF ARRHYTHMOGENIC FOCUS FOR ATRIAL FIBRILLATION N/A 1/24/2022    Procedure: Ablation atrial fibrillation;  Surgeon: Gabriel Hawley MD;  Location: [facility] EP LAB;  Service: Cardiology;  Laterality: N/A;  afib/afl, PVI (re-do)/CTI, RFA, REENA (cx if SR), SHADY, anes, MB, 3 Prep    APPLICATION OF WOUND VACUUM-ASSISTED CLOSURE DEVICE Left 8/3/2020    Procedure: APPLICATION, WOUND VAC;  Surgeon: SEMAJ Márquez III, MD;  Location: [facility] OR 2ND FLR;  Service: Peripheral Vascular;  Laterality: Left;    APPLICATION OF WOUND VACUUM-ASSISTED CLOSURE DEVICE Left 8/6/2020    Procedure: APPLICATION, WOUND VAC;  Surgeon: SEMAJ Márquez III, MD;  Location: [facility] OR 2ND FLR;  Service: Peripheral Vascular;  Laterality: Left;    CARPAL TUNNEL RELEASE Right 6/10/2020    Procedure: RELEASE, CARPAL TUNNEL - RIGHT;  Surgeon: Adelaida Hall MD;  Location: [facility] OR;  Service: Orthopedics;  Laterality: Right;  GENERAL AND REGIONAL    CARPAL TUNNEL RELEASE Left 5/5/2021    Procedure: RELEASE, CARPAL TUNNEL, LEFT;  Surgeon: Adelaida Hall MD;  Location: [facility] OR;  Service: Orthopedics;  Laterality: Left;  GENERAL & REGIONAL IN PACU    CLOSURE OF WOUND Left 8/6/2020    Procedure: CLOSURE, WOUND;  Surgeon: SEMAJ Márquez III, MD;  Location: [facility] OR 2ND FLR;  Service: Peripheral  Vascular;  Laterality: Left;  Complex    COLONOSCOPY N/A 8/17/2021    Procedure: COLONOSCOPY Suprep;  Surgeon: Loco Deluca MD;  Location: [facility];  Service: Endoscopy;  Laterality: N/A;    ESOPHAGOGASTRODUODENOSCOPY N/A 8/17/2021    Procedure: EGD (ESOPHAGOGASTRODUODENOSCOPY);  Surgeon: Loco Deluca MD;  Location: [facility];  Service: Endoscopy;  Laterality: N/A;  Patient is schedule to have her Covid test on 08/14/2021 at the [facility] @ 9:30am. [place].    EXPLORATION OF FEMORAL ARTERY Left 7/21/2020    Procedure: EXPLORATION, ARTERY, FEMORAL;  Surgeon: SEMAJ Márquez III, MD;  [facility];  Service: Peripheral Vascular;  Laterality: Left;  1. Urgent Direct repair, L SFA branch laceration    FOOT SURGERY      HYSTEROSCOPY WITH DILATION AND CURETTAGE OF UTERUS N/A 2/19/2022    Procedure: HYSTEROSCOPY, WITH DILATION AND CURETTAGE OF UTERUS;  Surgeon: Shane Palomo MD;  [address];  Service: OB/GYN;  Laterality: N/A;    INCISION AND DRAINAGE OF KNEE Left 5/12/2022    Procedure: INCISION AND DRAINAGE, Prepatellar bursectomy.;  Surgeon: Dre Guzmán MD;  Location: [address];  Service: Orthopedics;  Laterality: Left;    ROBOT-ASSISTED LAPAROSCOPIC ABDOMINAL HYSTERECTOMY USING DA KEIRY XI N/A 8/9/2022    Procedure: XI ROBOTIC HYSTERECTOMY;  Surgeon: Yolanda Barriga MD;  Location: [facility];  Service: OB/GYN;  Laterality: N/A;  dual console requested    ROBOT-ASSISTED LAPAROSCOPIC SALPINGO-OOPHORECTOMY Bilateral 8/9/2022    Procedure: ROBOTIC SALPINGO-OOPHORECTOMY;  Surgeon: Yolanda Barriga MD;  Location: [facility] OR;  Service: OB/GYN;  Laterality: Bilateral;    TREATMENT OF CARDIAC ARRHYTHMIA N/A 1/29/2020    Procedure: CARDIOVERSION;  Surgeon: Gabriel Hawley MD;  Location: [facility] EP LAB;  Service: Cardiology;  Laterality: N/A;  af, demi, dccv, anes, mb, 345    TREATMENT OF CARDIAC ARRHYTHMIA  1/24/2022    Procedure: Cardioversion or Defibrillation;   Surgeon: Gabriel Hawley MD;  Location: [facility] EP LAB;  Service: Cardiology;;    WOUND DEBRIDEMENT Left 8/6/2020    Procedure: DEBRIDEMENT, WOUND;  Surgeon: SEMAJ Márquez III, MD;  Location: [facility] OR [address] [place] [address];  Service: Peripheral Vascular;  Laterality: Left;     Allergies:     Review of patient's allergies indicates:   Allergen Reactions    Flecainide Shortness Of Breath and Swelling    Vancomycin analogues Hives, Itching and Rash     Medications:     Current Outpatient Medications on File Prior to Visit   Medication Sig Dispense Refill    acetaminophen (TYLENOL) 500 MG tablet Take 2 tablets (1,000 mg) by mouth at onset of pain or headache, may repeat if needed.      ALPRAZolam (XANAX) 0.5 MG tablet Take 1 tablet (0.5 mg total) by mouth 2 (two) times daily as needed for Anxiety. 60 tablet 4    apixaban (ELIQUIS) 5 mg Tab Take 1 tablet (5 mg total) by mouth 2 (two) times daily. 90 tablet 5    atorvastatin (LIPITOR) 40 MG tablet Take 1 tablet (40 mg total) by mouth once daily. Patient needs to schedule an appointment in the consult cardiology clinic. 90 tablet 3    atorvastatin (LIPITOR) 40 MG tablet Take 1 tablet (40 mg total) by mouth once daily. 90 tablet 3    b complex vitamins capsule Take 1 capsule by mouth once daily.      colchicine (COLCRYS) 0.6 mg tablet For gout attack: Take TWO tablets by mouth, then, 2 hours later, take ONE additional tablet. Then take 1 tablet twice daily only if still needed for gout pain. 20 tablet 4    DULoxetine (CYMBALTA) 60 MG capsule Take 2 capsules (120 mg total) by mouth once daily. 180 capsule 3    ferrous sulfate (SLOW RELEASE IRON) 142 mg (45 mg iron) TbSR Take 1 tablet (142 mg total) by mouth once daily. FOR 7 DAY THEN INCREASE TO TWICE DAILY AS TOLERATED      furosemide (LASIX) 40 MG tablet Take 1 tablet (40 mg total) by mouth 2 (two) times daily. Resume as needed for edema until followup with your PCP. 60 tablet 11    gluc-shikha-msm#9-K-eauh-reymundo-bor 750 mg-644 mg- 30  mg-1 mg Tab Take 1 tablet by mouth once daily.       ibuprofen (ADVIL,MOTRIN) 600 MG tablet Take 1 tablet (600 mg total) by mouth every 6 (six) hours as needed for Pain. 30 tablet 1    krill/om-3/dha/epa/phospho/ast (MEGARED OMEGA-3 KRILL OIL ORAL) Take 1 capsule by mouth once daily.      lisinopriL (PRINIVIL,ZESTRIL) 40 MG tablet Take 1 tablet (40 mg total) by mouth once daily. 90 tablet 3    melatonin 10 mg Tab Take 1-2 tablets by mouth nightly as needed (Sleep).      metoprolol succinate (TOPROL-XL) 50 MG 24 hr tablet Take 1 tablet (50 mg total) by mouth 2 (two) times daily. 180 tablet 3    multivitamin (THERAGRAN) per tablet Take 1 tablet by mouth once daily.      NIFEdipine (PROCARDIA-XL) 90 MG (OSM) 24 hr tablet Take 1 tablet (90 mg total) by mouth once daily. HOLD UNTIL FOLLOW-UP WITH YOUR PCP. 30 tablet 11    omeprazole (PRILOSEC) 20 MG capsule TAKE 1 CAPSULE BY MOUTH ONCE DAILY 90 capsule 3    potassium chloride SA (K-DUR,KLOR-CON) 20 MEQ tablet Take 1 tablet (20 mEq total) by mouth once daily. ONLY TAKE WITH LASIX. 30 tablet 11    predniSONE (DELTASONE) 10 MG tablet Take 1 tablet by mouth daily 10 tablet 0    propafenone (RYTHMOL) 225 MG Tab Take 1 tablet (225 mg total) by mouth every 8 (eight) hours. 90 tablet 11    SENNA 8.6 mg tablet Take 1 tablet by mouth 2 (two) times daily. (Patient taking differently: Take 1 tablet by mouth 2 (two) times daily. prn) 30 tablet 3    traZODone (DESYREL) 100 MG tablet Take 1 tablet (100 mg total) by mouth nightly as needed for Insomnia. 90 tablet 3    oxyCODONE-acetaminophen (PERCOCET) 7.5-325 mg per tablet Take 1 tablet by mouth every 8 (eight) hours as needed for Pain. 15 tablet 0    [DISCONTINUED] medroxyPROGESTERone (PROVERA) 10 MG tablet Take 2 tablets (20 mg total) by mouth 3 (three) times daily. 180 tablet 0    [DISCONTINUED] pantoprazole (PROTONIX) 40 MG tablet Take 1 tablet (40 mg total) by mouth once daily. (Patient not taking: Reported on 2/18/2022) 30 tablet  "0     Current Facility-Administered Medications on File Prior to Visit   Medication Dose Route Frequency Provider Last Rate Last Admin    sodium chloride 0.9% bolus 1,000 mL  1,000 mL Intravenous Once Laurie Montgomery NP         Social History:     Social History     Tobacco Use    Smoking status: Former     Types: Cigarettes     Quit date: 7/1/2019     Years since quitting: 3.5    Smokeless tobacco: Never   Substance Use Topics    Alcohol use: No     Family History:     Family History   Problem Relation Age of Onset    Cataracts Mother     Hypertension Mother     Thyroid disease Mother     Diabetes Father     Hypertension Father     Hypertension Sister     Hypertension Brother     Amblyopia Neg Hx     Blindness Neg Hx     Cancer Neg Hx     Glaucoma Neg Hx     Macular degeneration Neg Hx     Retinal detachment Neg Hx     Strabismus Neg Hx     Stroke Neg Hx     Breast cancer Neg Hx     Colon cancer Neg Hx     Ovarian cancer Neg Hx      Physical Exam:   BP (!) 165/72 (BP Location: Left arm, Patient Position: Sitting, BP Method: Large (Automatic))   Pulse 66   Ht 5' 6" (1.676 m)   Wt 127.4 kg (280 lb 13.9 oz)   LMP 03/25/2022 (Exact Date)   SpO2 99%   BMI 45.33 kg/m²        Physical Exam   Constitutional: She is oriented to person, place, and time. She does not appear ill. No distress.   HENT:   Mouth/Throat: Mucous membranes are moist.   Cardiovascular: Normal rate and regular rhythm.   Murmur heard.  Harsh midsystolic murmur is present at the upper right sternal border radiating to the neck.  Pulses:       Radial pulses are 2+ on the right side and 2+ on the left side.        Femoral pulses are 2+ on the right side and 2+ on the left side.       Dorsalis pedis pulses are 1+ on the right side and 1+ on the left side.        Posterior tibial pulses are 2+ on the right side and 2+ on the left side.   Well healed L CFA surgical scar  Normal Dayo's test in R wrist   Abdominal: Soft. Normal appearance. She exhibits " no distension. There is no abdominal tenderness.   Musculoskeletal:      Cervical back: Neck supple.      Right lower leg: No edema.      Left lower leg: No edema.   Neurological: She is alert and oriented to person, place, and time.   Skin: Skin is warm.      Labs:     Lab Results   Component Value Date     08/12/2022    K 4.3 08/12/2022     08/12/2022    CO2 26 08/12/2022    BUN 18 08/12/2022    CREATININE 1.1 08/12/2022    ANIONGAP 10 08/12/2022     Lab Results   Component Value Date    HGBA1C 5.7 (H) 05/07/2022     Lab Results   Component Value Date    BNP 86 01/06/2021     (H) 01/28/2020    Lab Results   Component Value Date    WBC 7.67 10/31/2022    HGB 9.5 (L) 10/31/2022    HCT 33.7 (L) 10/31/2022    HCT 28 (L) 08/11/2022     10/31/2022    GRAN 5.4 10/31/2022    GRAN 70.9 10/31/2022     Lab Results   Component Value Date    CHOL 164 07/06/2021    HDL 53 07/06/2021    LDLCALC 92.6 07/06/2021    TRIG 92 07/06/2021          Imaging:   TTE 10/10/22  The left ventricle is mildly enlarged with normal systolic function. The estimated ejection fraction is 60%.  Normal right ventricular size with normal right ventricular systolic function.  Grade II left ventricular diastolic dysfunction.  Moderate left atrial enlargement.  There is mild aortic valve stenosis.  Aortic valve area is 1.66 cm2; peak velocity is 2.94 m/s; mean gradient is 14 mmHg.  The estimated PA systolic pressure is 35 mmHg.  Normal central venous pressure (3 mmHg).    Holter 11/28/22  Sinus rhythm with frequent PACs (5.6% burden)  Nonsustained AT up to 4 beats in duration observed.    PET 1/12/23  There is a small sized, mild intensity mid to apical anteroseptal resting perfusion abnormality involving 7% of LV myocardium in the distribution of the mid  LAD. After pharmacologic stress, this perfusion abnormality is larger and more severe involving 13% of the LV myocardium, indicative of ischemia with underlying scar.    Within  the perfusion abnormality, absolute myocardial perfusion (cc/min/gm) averaged 0.44 cc/min/g at rest, 0.65 cc/min/g at stress and CFR was 1.46 , which equates to moderately reduced coronary flow capacity within the LAD territory.    There are no other significant perfusion abnormalities.    The whole heart absolute myocardial perfusion values averaged 0.66 cc/min/g at rest, which is normal; 1.03 cc/min/g at stress, which is moderately reduced; and CFR is 1.57 , which is moderately reduced.    CT attenuation images demonstrate moderate diffuse coronary calcifications in the LAD territory and no aortic calcifications.    The gated perfusion images showed an ejection fraction of 53% at rest and 57% during stress. A normal ejection fraction is greater than 47%.    The wall motion is normal at rest and during stress.    The LV cavity size is mildly enlarged at rest and stress.    The ECG portion of the study is negative for ischemia.    During stress, occasional PACs are noted. , During stress, occasional PVCs are noted.    The patient reported no chest pain during the stress test.    When compared to the previous study from 3/3/2020, there are significant changes. There is now a new reversible perfusion abnormality (with underlying scar) in the LAD territory with boderline flow capacity.      Assessment:     1. Abnormal nuclear stress test        2. Essential hypertension        3. Atrial Fibrillation (paroxysmal)        4. Mild aortic stenosis        5. Pure hypercholesterolemia        6. Type 2 diabetes mellitus with diabetic polyneuropathy, without long-term current use of insulin        7. Morbid obesity with BMI of 45.0-49.9, adult        8. Abnormal stress test  Ambulatory referral/consult to Interventional Cardiology          Plan:   Essential hypertension  Continue Lisinopril, Nifedipine, Toprol    Atrial Fibrillation (paroxysmal)  S/p PVI  Continue Toprol, Propafenone, Eliquis  Follows with Dr Chandan Ernandez  hypercholesterolemia  Continue Atorvastatin    Morbid obesity with BMI of 45.0-49.9, adult  Encouraged efforts at weight loss    Type 2 diabetes mellitus with diabetic polyneuropathy, without long-term current use of insulin  HbA1c 5.8%  Continue DM regimen    Abnormal nuclear stress test  --C +/- PCI, patient is a LIZ candidate  - Anti-platelet Therapy: ASA / Plavix; starting maintenance doses today; will need 300 Plavix x1 day of angiogram and PRU  - Access: Right radial (sofiya's test normal)  - Catheters: JL5, JR4  - Creatinine/CrCl: 1.1 (8/2022)  - Allergies: No shellfish / Iodine allergy  - Pre-Hydration: NS  - Pre-Op Med: Bendaryl 50mg pO   - All patient's questions were answered.  -The risks, benefits and alternatives of the procedure were explained to the patient.   -The risks of coronary angiography include but are not limited to: bleeding, infection, heart rhythm abnormalities, allergic reactions, kidney injury and potential need for dialysis, stroke and death.   - Should stenting be indicated, the patient has agreed to dual anti-platelet therapy for 1-consecutive year with a drug-eluting stent and a minimum of 1-month with the use of a bare metal stent  - Additionally, pt is aware that non-compliance is likely to result in stent clotting with heart attack, heart failure, and/or death  -The risks of moderate sedation include hypotension, respiratory depression, arrhythmias, bronchospasm, and death.   - Informed consent was obtained and the patient is agreeable to proceed with the procedure.      Discussed case with Dr Josiah Terry MD.    Easton Santiago MD PGY5  Cardiovascular Medicine Fellow  [facility]  Pager: [phone]    Staff:  I have personally taken the history and examined this patient and agree with the fellow's note as stated above and amended it accordingly :-)  Of note, this lady is losing wt and has already lost 55lbs.  This is encouraged.  Because of NAPIER and abnormal  nuclear stress test, we will do radial access for coronary angiography and possible intervention.  Informed consent obtained.

## 2023-01-23 NOTE — PROGRESS NOTES
Interventional Cardiology Clinic Note  Reason for Visit: Abnormal PET Stress  Referring Physician: Dr Cory Kilgore    HPI:   Mrs Alvarado is a 57 year old female with paroxysmal AFib s/p PVI, HTN, HLD, DM, mild AS, obesity referred for an abnormal stress test.    She was seen by Dr Kilgore in October for fatigue and NAPIER. Holter showed no recurrence of arrhythmia and PET stress was abnormal showing a reversible defect in the mid-apical anteroseptum.    Today, she continues to feel fatigued and SOB. She has significant NAPIER with ADLs around the house (sweeping, mopping, etc). Walkin around the house also causes her NAPIER that resolves quickly with rest. She does have twinges of chest discomfort randomly with exertion, but nothing more substantial than that. No leg swelling weight gain, orthopnea, PND. No bleeding recently while on Eliquis. She has lost 40 lbs in the past year.    Of note, she had 2 episodes of severe blood loss anemia. After her ablation with Dr Hawley in 2020 she had a L CFA injury requiring multiple pRBCs and vascular surgery intervention. No more sticks to L CFA. She also have severe vaginal bleeding that needed D&C and hysterectomy 8/2022. She remains on Eliquis now and no other bleeding. Hb 9.5 from 2 months ago.     ROS:    Constitution: Negative for fever, chills, weight gain. Positive fatigue and weight loss.   HENT: Negative for sore throat, rhinorrhea, or headache.  Eyes: Negative for blurred or double vision.   Cardiovascular: See above  Pulmonary: Positive for SOB/NAPIER.  Gastrointestinal: Negative for abdominal pain, nausea, vomiting, or diarrhea.   : Negative for dysuria.   Neurological: Negative for focal weakness or sensory changes.  PMH:     Past Medical History:   Diagnosis Date    Anticoagulant long-term use     Arthritis     Atrial fibrillation     cardioversion    Atrial fibrillation with RVR     Avascular necrosis     L hand    CHF (congestive heart failure)     Depression      Encounter for blood transfusion 07/22/2020    Encounter for blood transfusion 03/2022    GERD (gastroesophageal reflux disease)     Hx of psychiatric care     Hypertension     Iron deficiency anemia 5/7/2022    TIBC 444 with saturated iron 8    Obese     Pre-diabetes 5/7/2022    Psychiatric problem     Sleep apnea     wears cpap     Past Surgical History:   Procedure Laterality Date    ABLATION OF ARRHYTHMOGENIC FOCUS FOR ATRIAL FIBRILLATION N/A 7/20/2020    Procedure: Ablation atrial fibrillation;  Surgeon: Gabriel Hawley MD;  Location: [facility] EP LAB;  Service: Cardiology;  Laterality: N/A;  afib, PVI, RFA, REENA, SHADY, anes, MB, 3 Prep    ABLATION OF ARRHYTHMOGENIC FOCUS FOR ATRIAL FIBRILLATION N/A 1/24/2022    Procedure: Ablation atrial fibrillation;  Surgeon: Gabriel Hawley MD;  Location: [facility] EP LAB;  Service: Cardiology;  Laterality: N/A;  afib/afl, PVI (re-do)/CTI, RFA, REENA (cx if SR), SHADY, anes, MB, 3 Prep    APPLICATION OF WOUND VACUUM-ASSISTED CLOSURE DEVICE Left 8/3/2020    Procedure: APPLICATION, WOUND VAC;  Surgeon: SEMAJ Márquez III, MD;  Location: [facility] OR 2ND FLR;  Service: Peripheral Vascular;  Laterality: Left;    APPLICATION OF WOUND VACUUM-ASSISTED CLOSURE DEVICE Left 8/6/2020    Procedure: APPLICATION, WOUND VAC;  Surgeon: SEMAJ Márquez III, MD;  Location: [facility] OR 2ND FLR;  Service: Peripheral Vascular;  Laterality: Left;    CARPAL TUNNEL RELEASE Right 6/10/2020    Procedure: RELEASE, CARPAL TUNNEL - RIGHT;  Surgeon: Adelaida Hall MD;  Location: [facility] OR;  Service: Orthopedics;  Laterality: Right;  GENERAL AND REGIONAL    CARPAL TUNNEL RELEASE Left 5/5/2021    Procedure: RELEASE, CARPAL TUNNEL, LEFT;  Surgeon: Adelaida Hall MD;  Location: [facility] OR;  Service: Orthopedics;  Laterality: Left;  GENERAL & REGIONAL IN PACU    CLOSURE OF WOUND Left 8/6/2020    Procedure: CLOSURE, WOUND;  Surgeon: SEMAJ Márquze III, MD;  Location: [facility] OR 2ND FLR;  Service: Peripheral  Vascular;  Laterality: Left;  Complex    COLONOSCOPY N/A 8/17/2021    Procedure: COLONOSCOPY Suprep;  Surgeon: Loco Deluca MD;  Location: [facility];  Service: Endoscopy;  Laterality: N/A;    ESOPHAGOGASTRODUODENOSCOPY N/A 8/17/2021    Procedure: EGD (ESOPHAGOGASTRODUODENOSCOPY);  Surgeon: Loco Deluca MD;  Location: [facility];  Service: Endoscopy;  Laterality: N/A;  Patient is schedule to have her Covid test on 08/14/2021 at the [facility] @ 9:30am. [place].    EXPLORATION OF FEMORAL ARTERY Left 7/21/2020    Procedure: EXPLORATION, ARTERY, FEMORAL;  Surgeon: SEMAJ Márquez III, MD;  [facility];  Service: Peripheral Vascular;  Laterality: Left;  1. Urgent Direct repair, L SFA branch laceration    FOOT SURGERY      HYSTEROSCOPY WITH DILATION AND CURETTAGE OF UTERUS N/A 2/19/2022    Procedure: HYSTEROSCOPY, WITH DILATION AND CURETTAGE OF UTERUS;  Surgeon: Shane Palomo MD;  [address];  Service: OB/GYN;  Laterality: N/A;    INCISION AND DRAINAGE OF KNEE Left 5/12/2022    Procedure: INCISION AND DRAINAGE, Prepatellar bursectomy.;  Surgeon: Dre Guzmán MD;  Location: [address];  Service: Orthopedics;  Laterality: Left;    ROBOT-ASSISTED LAPAROSCOPIC ABDOMINAL HYSTERECTOMY USING DA KEIRY XI N/A 8/9/2022    Procedure: XI ROBOTIC HYSTERECTOMY;  Surgeon: Yolanda Barriga MD;  Location: [facility];  Service: OB/GYN;  Laterality: N/A;  dual console requested    ROBOT-ASSISTED LAPAROSCOPIC SALPINGO-OOPHORECTOMY Bilateral 8/9/2022    Procedure: ROBOTIC SALPINGO-OOPHORECTOMY;  Surgeon: Yolanda Barriga MD;  Location: [facility] OR;  Service: OB/GYN;  Laterality: Bilateral;    TREATMENT OF CARDIAC ARRHYTHMIA N/A 1/29/2020    Procedure: CARDIOVERSION;  Surgeon: Gabriel Hawley MD;  Location: [facility] EP LAB;  Service: Cardiology;  Laterality: N/A;  af, demi, dccv, anes, mb, 345    TREATMENT OF CARDIAC ARRHYTHMIA  1/24/2022    Procedure: Cardioversion or Defibrillation;   Surgeon: Gabriel Hawley MD;  Location: [facility] EP LAB;  Service: Cardiology;;    WOUND DEBRIDEMENT Left 8/6/2020    Procedure: DEBRIDEMENT, WOUND;  Surgeon: SEMAJ Márquez III, MD;  Location: [facility] OR [address] [place] [address];  Service: Peripheral Vascular;  Laterality: Left;     Allergies:     Review of patient's allergies indicates:   Allergen Reactions    Flecainide Shortness Of Breath and Swelling    Vancomycin analogues Hives, Itching and Rash     Medications:     Current Outpatient Medications on File Prior to Visit   Medication Sig Dispense Refill    acetaminophen (TYLENOL) 500 MG tablet Take 2 tablets (1,000 mg) by mouth at onset of pain or headache, may repeat if needed.      ALPRAZolam (XANAX) 0.5 MG tablet Take 1 tablet (0.5 mg total) by mouth 2 (two) times daily as needed for Anxiety. 60 tablet 4    apixaban (ELIQUIS) 5 mg Tab Take 1 tablet (5 mg total) by mouth 2 (two) times daily. 90 tablet 5    atorvastatin (LIPITOR) 40 MG tablet Take 1 tablet (40 mg total) by mouth once daily. Patient needs to schedule an appointment in the consult cardiology clinic. 90 tablet 3    atorvastatin (LIPITOR) 40 MG tablet Take 1 tablet (40 mg total) by mouth once daily. 90 tablet 3    b complex vitamins capsule Take 1 capsule by mouth once daily.      colchicine (COLCRYS) 0.6 mg tablet For gout attack: Take TWO tablets by mouth, then, 2 hours later, take ONE additional tablet. Then take 1 tablet twice daily only if still needed for gout pain. 20 tablet 4    DULoxetine (CYMBALTA) 60 MG capsule Take 2 capsules (120 mg total) by mouth once daily. 180 capsule 3    ferrous sulfate (SLOW RELEASE IRON) 142 mg (45 mg iron) TbSR Take 1 tablet (142 mg total) by mouth once daily. FOR 7 DAY THEN INCREASE TO TWICE DAILY AS TOLERATED      furosemide (LASIX) 40 MG tablet Take 1 tablet (40 mg total) by mouth 2 (two) times daily. Resume as needed for edema until followup with your PCP. 60 tablet 11    gluc-shikha-msm#6-I-glyv-reymundo-bor 750 mg-644 mg- 30  mg-1 mg Tab Take 1 tablet by mouth once daily.       ibuprofen (ADVIL,MOTRIN) 600 MG tablet Take 1 tablet (600 mg total) by mouth every 6 (six) hours as needed for Pain. 30 tablet 1    krill/om-3/dha/epa/phospho/ast (MEGARED OMEGA-3 KRILL OIL ORAL) Take 1 capsule by mouth once daily.      lisinopriL (PRINIVIL,ZESTRIL) 40 MG tablet Take 1 tablet (40 mg total) by mouth once daily. 90 tablet 3    melatonin 10 mg Tab Take 1-2 tablets by mouth nightly as needed (Sleep).      metoprolol succinate (TOPROL-XL) 50 MG 24 hr tablet Take 1 tablet (50 mg total) by mouth 2 (two) times daily. 180 tablet 3    multivitamin (THERAGRAN) per tablet Take 1 tablet by mouth once daily.      NIFEdipine (PROCARDIA-XL) 90 MG (OSM) 24 hr tablet Take 1 tablet (90 mg total) by mouth once daily. HOLD UNTIL FOLLOW-UP WITH YOUR PCP. 30 tablet 11    omeprazole (PRILOSEC) 20 MG capsule TAKE 1 CAPSULE BY MOUTH ONCE DAILY 90 capsule 3    potassium chloride SA (K-DUR,KLOR-CON) 20 MEQ tablet Take 1 tablet (20 mEq total) by mouth once daily. ONLY TAKE WITH LASIX. 30 tablet 11    predniSONE (DELTASONE) 10 MG tablet Take 1 tablet by mouth daily 10 tablet 0    propafenone (RYTHMOL) 225 MG Tab Take 1 tablet (225 mg total) by mouth every 8 (eight) hours. 90 tablet 11    SENNA 8.6 mg tablet Take 1 tablet by mouth 2 (two) times daily. (Patient taking differently: Take 1 tablet by mouth 2 (two) times daily. prn) 30 tablet 3    traZODone (DESYREL) 100 MG tablet Take 1 tablet (100 mg total) by mouth nightly as needed for Insomnia. 90 tablet 3    oxyCODONE-acetaminophen (PERCOCET) 7.5-325 mg per tablet Take 1 tablet by mouth every 8 (eight) hours as needed for Pain. 15 tablet 0    [DISCONTINUED] medroxyPROGESTERone (PROVERA) 10 MG tablet Take 2 tablets (20 mg total) by mouth 3 (three) times daily. 180 tablet 0    [DISCONTINUED] pantoprazole (PROTONIX) 40 MG tablet Take 1 tablet (40 mg total) by mouth once daily. (Patient not taking: Reported on 2/18/2022) 30 tablet  "0     Current Facility-Administered Medications on File Prior to Visit   Medication Dose Route Frequency Provider Last Rate Last Admin    sodium chloride 0.9% bolus 1,000 mL  1,000 mL Intravenous Once Laurie Montgomery NP         Social History:     Social History     Tobacco Use    Smoking status: Former     Types: Cigarettes     Quit date: 7/1/2019     Years since quitting: 3.5    Smokeless tobacco: Never   Substance Use Topics    Alcohol use: No     Family History:     Family History   Problem Relation Age of Onset    Cataracts Mother     Hypertension Mother     Thyroid disease Mother     Diabetes Father     Hypertension Father     Hypertension Sister     Hypertension Brother     Amblyopia Neg Hx     Blindness Neg Hx     Cancer Neg Hx     Glaucoma Neg Hx     Macular degeneration Neg Hx     Retinal detachment Neg Hx     Strabismus Neg Hx     Stroke Neg Hx     Breast cancer Neg Hx     Colon cancer Neg Hx     Ovarian cancer Neg Hx      Physical Exam:   BP (!) 165/72 (BP Location: Left arm, Patient Position: Sitting, BP Method: Large (Automatic))   Pulse 66   Ht 5' 6" (1.676 m)   Wt 127.4 kg (280 lb 13.9 oz)   LMP 03/25/2022 (Exact Date)   SpO2 99%   BMI 45.33 kg/m²        Physical Exam   Constitutional: She is oriented to person, place, and time. She does not appear ill. No distress.   HENT:   Mouth/Throat: Mucous membranes are moist.   Cardiovascular: Normal rate and regular rhythm.   Murmur heard.  Harsh midsystolic murmur is present at the upper right sternal border radiating to the neck.  Pulses:       Radial pulses are 2+ on the right side and 2+ on the left side.        Femoral pulses are 2+ on the right side and 2+ on the left side.       Dorsalis pedis pulses are 1+ on the right side and 1+ on the left side.        Posterior tibial pulses are 2+ on the right side and 2+ on the left side.   Well healed L CFA surgical scar  Normal Dayo's test in R wrist   Abdominal: Soft. Normal appearance. She exhibits " no distension. There is no abdominal tenderness.   Musculoskeletal:      Cervical back: Neck supple.      Right lower leg: No edema.      Left lower leg: No edema.   Neurological: She is alert and oriented to person, place, and time.   Skin: Skin is warm.      Labs:     Lab Results   Component Value Date     08/12/2022    K 4.3 08/12/2022     08/12/2022    CO2 26 08/12/2022    BUN 18 08/12/2022    CREATININE 1.1 08/12/2022    ANIONGAP 10 08/12/2022     Lab Results   Component Value Date    HGBA1C 5.7 (H) 05/07/2022     Lab Results   Component Value Date    BNP 86 01/06/2021     (H) 01/28/2020    Lab Results   Component Value Date    WBC 7.67 10/31/2022    HGB 9.5 (L) 10/31/2022    HCT 33.7 (L) 10/31/2022    HCT 28 (L) 08/11/2022     10/31/2022    GRAN 5.4 10/31/2022    GRAN 70.9 10/31/2022     Lab Results   Component Value Date    CHOL 164 07/06/2021    HDL 53 07/06/2021    LDLCALC 92.6 07/06/2021    TRIG 92 07/06/2021          Imaging:   TTE 10/10/22  The left ventricle is mildly enlarged with normal systolic function. The estimated ejection fraction is 60%.  Normal right ventricular size with normal right ventricular systolic function.  Grade II left ventricular diastolic dysfunction.  Moderate left atrial enlargement.  There is mild aortic valve stenosis.  Aortic valve area is 1.66 cm2; peak velocity is 2.94 m/s; mean gradient is 14 mmHg.  The estimated PA systolic pressure is 35 mmHg.  Normal central venous pressure (3 mmHg).    Holter 11/28/22  Sinus rhythm with frequent PACs (5.6% burden)  Nonsustained AT up to 4 beats in duration observed.    PET 1/12/23  There is a small sized, mild intensity mid to apical anteroseptal resting perfusion abnormality involving 7% of LV myocardium in the distribution of the mid  LAD. After pharmacologic stress, this perfusion abnormality is larger and more severe involving 13% of the LV myocardium, indicative of ischemia with underlying scar.    Within  the perfusion abnormality, absolute myocardial perfusion (cc/min/gm) averaged 0.44 cc/min/g at rest, 0.65 cc/min/g at stress and CFR was 1.46 , which equates to moderately reduced coronary flow capacity within the LAD territory.    There are no other significant perfusion abnormalities.    The whole heart absolute myocardial perfusion values averaged 0.66 cc/min/g at rest, which is normal; 1.03 cc/min/g at stress, which is moderately reduced; and CFR is 1.57 , which is moderately reduced.    CT attenuation images demonstrate moderate diffuse coronary calcifications in the LAD territory and no aortic calcifications.    The gated perfusion images showed an ejection fraction of 53% at rest and 57% during stress. A normal ejection fraction is greater than 47%.    The wall motion is normal at rest and during stress.    The LV cavity size is mildly enlarged at rest and stress.    The ECG portion of the study is negative for ischemia.    During stress, occasional PACs are noted. , During stress, occasional PVCs are noted.    The patient reported no chest pain during the stress test.    When compared to the previous study from 3/3/2020, there are significant changes. There is now a new reversible perfusion abnormality (with underlying scar) in the LAD territory with boderline flow capacity.      Assessment:     1. Abnormal nuclear stress test        2. Essential hypertension        3. Atrial Fibrillation (paroxysmal)        4. Mild aortic stenosis        5. Pure hypercholesterolemia        6. Type 2 diabetes mellitus with diabetic polyneuropathy, without long-term current use of insulin        7. Morbid obesity with BMI of 45.0-49.9, adult        8. Abnormal stress test  Ambulatory referral/consult to Interventional Cardiology          Plan:   Essential hypertension  Continue Lisinopril, Nifedipine, Toprol    Atrial Fibrillation (paroxysmal)  S/p PVI  Continue Toprol, Propafenone, Eliquis  Follows with Dr Chandan Ernandez  hypercholesterolemia  Continue Atorvastatin    Morbid obesity with BMI of 45.0-49.9, adult  Encouraged efforts at weight loss    Type 2 diabetes mellitus with diabetic polyneuropathy, without long-term current use of insulin  HbA1c 5.8%  Continue DM regimen    Abnormal nuclear stress test  --C +/- PCI, patient is a ILZ candidate  - Anti-platelet Therapy: ASA / Plavix; starting maintenance doses today; will need 300 Plavix x1 day of angiogram and PRU  - Access: Right radial (sofiya's test normal)  - Catheters: JL5, JR4  - Creatinine/CrCl: 1.1 (8/2022)  - Allergies: No shellfish / Iodine allergy  - Pre-Hydration: NS  - Pre-Op Med: Bendaryl 50mg pO   - All patient's questions were answered.  -The risks, benefits and alternatives of the procedure were explained to the patient.   -The risks of coronary angiography include but are not limited to: bleeding, infection, heart rhythm abnormalities, allergic reactions, kidney injury and potential need for dialysis, stroke and death.   - Should stenting be indicated, the patient has agreed to dual anti-platelet therapy for 1-consecutive year with a drug-eluting stent and a minimum of 1-month with the use of a bare metal stent  - Additionally, pt is aware that non-compliance is likely to result in stent clotting with heart attack, heart failure, and/or death  -The risks of moderate sedation include hypotension, respiratory depression, arrhythmias, bronchospasm, and death.   - Informed consent was obtained and the patient is agreeable to proceed with the procedure.      Discussed case with Dr Josiah Terry MD.    Easton Santiago MD PGY5  Cardiovascular Medicine Fellow  [facility]  Pager: [phone]    Staff:  I have personally taken the history and examined this patient and agree with the fellow's note as stated above and amended it accordingly :-)  Of note, this lady is losing wt and has already lost 55lbs.  This is encouraged.  Because of NAPIER and abnormal  nuclear stress test, we will do radial access for coronary angiography and possible intervention.  Informed consent obtained.

## 2023-01-23 NOTE — PROGRESS NOTES
OUTPATIENT CATHETERIZATION INSTRUCTIONS    You have been scheduled for a procedure in the catheterization lab on Tuesday, February 7, 2023.     Please report to the Cardiology Waiting Area on the Third floor of the hospital and check in at 8 AM.   You will then be taken to the SSCU (Short Stay Cardiac Unit) and prepared for your procedure. Please be aware that this is not the time of your procedure but the time you are to arrive. The procedures are scheduled on an hourly basis; however, emergency cases take precedence over all other cases.       No solid foods 8 hours prior to your procedure.  You may have clear liquids until the time of your admission which should be 2 hours prior to your procedure.  You are encouraged to drink at least 8 ounces of clear liquids prior to your admission to SSCU.  Patients with gastric emptying issues should be fasting for 6- 8 hours prior to the procedure.  Clear liquids include water, black coffee, clear juices, and performance drinks - no pulp or milk.    Heart failure or dialysis patients will be limited to 8 ounces (1 cup) of clear liquids up until 2 hours of the procedure.    3.   You may take your regular morning medications with water. If there are any medications that you should not take you will be instructed to hold them that morning. If you         are diabetic and on Metformin (Glucophage) do not take it the day before, the day of, and for 2 days after your procedure.  4.   Hold Eliquis 3 days prior to your procedure.      The procedure will take 1-2 hours to perform. After the procedure, you will return to SSCU on the third floor of the hospital. You will need to lie still (or keep your arm still) for the next 2 to 4 hours to minimize bleeding from the puncture site.  This time is determined by your physician.  Your family may remain in the room with you during this time.       You may be able to be discharged home that same afternoon if there is someone to drive you  "home and there are no complications.  Your doctor will determine, based on your progress, the date and time of your discharge. The results of your procedure will be discussed with you before you are discharged. Any further testing or procedures will be scheduled for you either before you leave or after your discharge..     Special Instructions:  Start Plavix 75mg:  one daily, including day of procedure  Start 81mg Aspirin daily, including day of procedure    If you should have any questions, concerns, or need to change the date of your procedure, please call WILBERT Bingham @ [phone]",                THE ABOVE INSTRUCTIONS WERE GIVEN TO THE PATIENT VERBALLY AND THEY VERBALIZED UNDERSTANDING.  THEY DO NOT REQUIRE ANY SPECIAL NEEDS AND DO NOT HAVE ANY LEARNING BARRIERS.          Directions for Reporting to Cardiology Waiting Area in the Hospital  If you park in the Parking Garage:  Take elevators to the1st floor of the parking garage.  Continue past the gift shop, coffee shop, and piano.  Take a right and go to the gold elevators. (Elevator B)  Take the elevator to the 3rd floor.  Follow the arrow on the sign on the wall that says Cath Lab Registration/EP Lab Registration.  Follow the long hallway all the way around until you come to a big open area.  This is the registration area.  Check in at Reception Desk.    OR    If family is dropping you off:  Have them drop you off at the front of the Hospital under the green overhang.  Enter through the doors and take a right.  Take the E elevators to the 3rd floor Cardiology Waiting Area.  Check in at the Reception Desk in the waiting room.       "

## 2023-01-30 ENCOUNTER — OUTPATIENT CASE MANAGEMENT (OUTPATIENT)
Dept: ADMINISTRATIVE | Facility: OTHER | Age: 58
End: 2023-01-30
Payer: COMMERCIAL

## 2023-01-30 NOTE — LETTER
February 6, 2023    Vicky Alvarado  [address]  [place] [address]             [facility] [address] [place] [address] Dear MsHuseyin Perry,    This is WILBERT Romano with [facility] Outpatient Case Management Department. I have been unsuccessful at reaching you to follow-up to see how you have been doing. Please call me back at your earliest convenience to discuss your health care needs.      I can be reached at [phone] from 8:00AM to 4:30 PM on Monday thru Friday. [facility] also has a program where a nurse is available 24/7 to answer questions or provide medical advice, their number is [phone].    Thanks,      Rosalina Herr RN  Outpatient Case Management/Disease Management  [phone]

## 2023-02-06 NOTE — PROGRESS NOTES
Outpatient Care Management  Plan of Care Follow Up Visit    Patient: Vicky Alvarado  MRN: [id number]  Date of Service: 01/30/2023  Completed by: Rosalina Herr RN  Referral Date: 06/17/2022    Reason for Visit   Patient presents with    OPCM Chart Review     1/30/23    OPCM RN First Follow-Up Attempt     1/30/23    OPCM RN Second Follow-Up Attempt     2/6/23    OPCM RN Third Follow-Up Attempt     2/16/2023    Case Closure     Unable to reach pt.        Brief Summary: Unable to reach pt  Next Steps: Case Closed

## 2023-02-07 ENCOUNTER — HOSPITAL ENCOUNTER (OUTPATIENT)
Facility: HOSPITAL | Age: 58
Discharge: HOME | End: 2023-02-07
Attending: INTERNAL MEDICINE | Admitting: INTERNAL MEDICINE
Payer: COMMERCIAL

## 2023-02-07 VITALS
HEIGHT: 66 IN | OXYGEN SATURATION: 93 % | RESPIRATION RATE: 16 BRPM | BODY MASS INDEX: 45 KG/M2 | TEMPERATURE: 98 F | DIASTOLIC BLOOD PRESSURE: 69 MMHG | HEART RATE: 62 BPM | SYSTOLIC BLOOD PRESSURE: 144 MMHG | WEIGHT: 280 LBS

## 2023-02-07 DIAGNOSIS — R94.39 ABNORMAL STRESS TEST: ICD-10-CM

## 2023-02-07 DIAGNOSIS — R94.39 ABNORMAL NUCLEAR STRESS TEST: Primary | ICD-10-CM

## 2023-02-07 LAB
ABO + RH BLD: NORMAL
ANION GAP SERPL CALC-SCNC: 12 MMOL/L (ref 8–16)
APTT BLDCRRT: 23.3 SEC (ref 21–32)
BASOPHILS # BLD AUTO: 0.05 K/UL (ref 0–0.2)
BASOPHILS NFR BLD: 1 % (ref 0–1.9)
BLD GP AB SCN CELLS X3 SERPL QL: NORMAL
BUN SERPL-MCNC: 7 MG/DL (ref 6–20)
CALCIUM SERPL-MCNC: 8.9 MG/DL (ref 8.7–10.5)
CHLORIDE SERPL-SCNC: 107 MMOL/L (ref 95–110)
CO2 SERPL-SCNC: 23 MMOL/L (ref 23–29)
CREAT SERPL-MCNC: 0.9 MG/DL (ref 0.5–1.4)
DIFFERENTIAL METHOD: ABNORMAL
EOSINOPHIL # BLD AUTO: 0.2 K/UL (ref 0–0.5)
EOSINOPHIL NFR BLD: 3 % (ref 0–8)
ERYTHROCYTE [DISTWIDTH] IN BLOOD BY AUTOMATED COUNT: 19.7 % (ref 11.5–14.5)
EST. GFR  (NO RACE VARIABLE): >60 ML/MIN/1.73 M^2
GLUCOSE SERPL-MCNC: 107 MG/DL (ref 70–110)
HCT VFR BLD AUTO: 27.8 % (ref 37–48.5)
HGB BLD-MCNC: 7.6 G/DL (ref 12–16)
IMM GRANULOCYTES # BLD AUTO: 0.05 K/UL (ref 0–0.04)
IMM GRANULOCYTES NFR BLD AUTO: 1 % (ref 0–0.5)
INR PPP: 1.1 (ref 0.8–1.2)
LYMPHOCYTES # BLD AUTO: 0.8 K/UL (ref 1–4.8)
LYMPHOCYTES NFR BLD: 15.9 % (ref 18–48)
MCH RBC QN AUTO: 22.7 PG (ref 27–31)
MCHC RBC AUTO-ENTMCNC: 27.3 G/DL (ref 32–36)
MCV RBC AUTO: 83 FL (ref 82–98)
MONOCYTES # BLD AUTO: 0.5 K/UL (ref 0.3–1)
MONOCYTES NFR BLD: 9.4 % (ref 4–15)
NEUTROPHILS # BLD AUTO: 3.5 K/UL (ref 1.8–7.7)
NEUTROPHILS NFR BLD: 69.7 % (ref 38–73)
NRBC BLD-RTO: 0 /100 WBC
PLATELET # BLD AUTO: 158 K/UL (ref 150–450)
PLATELET RESPONSE PLAVIX: 103 PRU (ref 194–418)
PMV BLD AUTO: 11.7 FL (ref 9.2–12.9)
POTASSIUM SERPL-SCNC: 3 MMOL/L (ref 3.5–5.1)
PROTHROMBIN TIME: 10.9 SEC (ref 9–12.5)
RBC # BLD AUTO: 3.35 M/UL (ref 4–5.4)
SODIUM SERPL-SCNC: 142 MMOL/L (ref 136–145)
WBC # BLD AUTO: 5.02 K/UL (ref 3.9–12.7)

## 2023-02-07 PROCEDURE — C1769 GUIDE WIRE: HCPCS | Performed by: INTERNAL MEDICINE

## 2023-02-07 PROCEDURE — 99152 MOD SED SAME PHYS/QHP 5/>YRS: CPT | Mod: ,,, | Performed by: INTERNAL MEDICINE

## 2023-02-07 PROCEDURE — 25500020 PHARM REV CODE 255: Performed by: INTERNAL MEDICINE

## 2023-02-07 PROCEDURE — 93010 ELECTROCARDIOGRAM REPORT: CPT | Mod: ,,, | Performed by: INTERNAL MEDICINE

## 2023-02-07 PROCEDURE — 25000003 PHARM REV CODE 250: Performed by: INTERNAL MEDICINE

## 2023-02-07 PROCEDURE — 99153 MOD SED SAME PHYS/QHP EA: CPT | Performed by: INTERNAL MEDICINE

## 2023-02-07 PROCEDURE — 63600175 PHARM REV CODE 636 W HCPCS: Performed by: INTERNAL MEDICINE

## 2023-02-07 PROCEDURE — 86900 BLOOD TYPING SEROLOGIC ABO: CPT | Performed by: PHYSICIAN ASSISTANT

## 2023-02-07 PROCEDURE — 80048 BASIC METABOLIC PNL TOTAL CA: CPT | Performed by: PHYSICIAN ASSISTANT

## 2023-02-07 PROCEDURE — 85025 COMPLETE CBC W/AUTO DIFF WBC: CPT | Performed by: PHYSICIAN ASSISTANT

## 2023-02-07 PROCEDURE — 93454 CORONARY ARTERY ANGIO S&I: CPT | Mod: 26,,, | Performed by: INTERNAL MEDICINE

## 2023-02-07 PROCEDURE — 93454 CORONARY ARTERY ANGIO S&I: CPT | Performed by: INTERNAL MEDICINE

## 2023-02-07 PROCEDURE — 85730 THROMBOPLASTIN TIME PARTIAL: CPT | Performed by: PHYSICIAN ASSISTANT

## 2023-02-07 PROCEDURE — 99152 PR MOD CONSCIOUS SEDATION, SAME PHYS, 5+ YRS, FIRST 15 MIN: ICD-10-PCS | Mod: ,,, | Performed by: INTERNAL MEDICINE

## 2023-02-07 PROCEDURE — 93010 EKG 12-LEAD: ICD-10-PCS | Mod: ,,, | Performed by: INTERNAL MEDICINE

## 2023-02-07 PROCEDURE — 93005 ELECTROCARDIOGRAM TRACING: CPT

## 2023-02-07 PROCEDURE — 25000003 PHARM REV CODE 250: Performed by: PHYSICIAN ASSISTANT

## 2023-02-07 PROCEDURE — 85610 PROTHROMBIN TIME: CPT | Performed by: PHYSICIAN ASSISTANT

## 2023-02-07 PROCEDURE — C1894 INTRO/SHEATH, NON-LASER: HCPCS | Performed by: INTERNAL MEDICINE

## 2023-02-07 PROCEDURE — 99152 MOD SED SAME PHYS/QHP 5/>YRS: CPT | Performed by: INTERNAL MEDICINE

## 2023-02-07 PROCEDURE — 85576 BLOOD PLATELET AGGREGATION: CPT | Performed by: PHYSICIAN ASSISTANT

## 2023-02-07 PROCEDURE — 93454 PR CATH PLACE/CORONARY ANGIO, IMG SUPER/INTERP: ICD-10-PCS | Mod: 26,,, | Performed by: INTERNAL MEDICINE

## 2023-02-07 RX ORDER — HEPARIN SOD,PORCINE/0.9 % NACL 1000/500ML
INTRAVENOUS SOLUTION INTRAVENOUS
Status: DISCONTINUED | OUTPATIENT
Start: 2023-02-07 | End: 2023-02-07 | Stop reason: HOSPADM

## 2023-02-07 RX ORDER — HEPARIN SODIUM 1000 [USP'U]/ML
INJECTION, SOLUTION INTRAVENOUS; SUBCUTANEOUS
Status: DISCONTINUED | OUTPATIENT
Start: 2023-02-07 | End: 2023-02-07 | Stop reason: HOSPADM

## 2023-02-07 RX ORDER — ACETAMINOPHEN 325 MG/1
650 TABLET ORAL EVERY 4 HOURS PRN
Status: DISCONTINUED | OUTPATIENT
Start: 2023-02-07 | End: 2023-02-07 | Stop reason: HOSPADM

## 2023-02-07 RX ORDER — SODIUM CHLORIDE 9 MG/ML
INJECTION, SOLUTION INTRAVENOUS CONTINUOUS
Status: ACTIVE | OUTPATIENT
Start: 2023-02-07 | End: 2023-02-07

## 2023-02-07 RX ORDER — CLOPIDOGREL 300 MG/1
TABLET, FILM COATED ORAL
Status: DISCONTINUED | OUTPATIENT
Start: 2023-02-07 | End: 2023-02-07 | Stop reason: HOSPADM

## 2023-02-07 RX ORDER — DIPHENHYDRAMINE HCL 50 MG
50 CAPSULE ORAL ONCE
Status: COMPLETED | OUTPATIENT
Start: 2023-02-07 | End: 2023-02-07

## 2023-02-07 RX ORDER — MIDAZOLAM HYDROCHLORIDE 1 MG/ML
INJECTION, SOLUTION INTRAMUSCULAR; INTRAVENOUS
Status: DISCONTINUED | OUTPATIENT
Start: 2023-02-07 | End: 2023-02-07 | Stop reason: HOSPADM

## 2023-02-07 RX ORDER — LIDOCAINE HYDROCHLORIDE 10 MG/ML
INJECTION INFILTRATION; PERINEURAL
Status: DISCONTINUED | OUTPATIENT
Start: 2023-02-07 | End: 2023-02-07 | Stop reason: HOSPADM

## 2023-02-07 RX ORDER — ONDANSETRON 8 MG/1
8 TABLET, ORALLY DISINTEGRATING ORAL EVERY 8 HOURS PRN
Status: DISCONTINUED | OUTPATIENT
Start: 2023-02-07 | End: 2023-02-07 | Stop reason: HOSPADM

## 2023-02-07 RX ADMIN — SODIUM CHLORIDE: 9 INJECTION, SOLUTION INTRAVENOUS at 09:02

## 2023-02-07 RX ADMIN — DIPHENHYDRAMINE HYDROCHLORIDE 50 MG: 50 CAPSULE ORAL at 09:02

## 2023-02-07 NOTE — PLAN OF CARE
Received via stretcher from cath lab procedure.  Report from WILBERT Hunter.  Denies pain or SOB.  VSS.  Vasc band in place to right wrist intact.  No bleeding or hematoma noted.  Food and drinks provided.  Will continue to monitor.

## 2023-02-07 NOTE — Clinical Note
The right groin and right radial was prepped. The site was prepped with ChloraPrep. The patient was draped. The patient was positioned supine.

## 2023-02-07 NOTE — DISCHARGE SUMMARY
[facility] Cath Lab  Interventional Cardiology  Discharge Summary      Patient Name: Vicky Alvarado  MRN: [id number]  Admission Date: 2/7/2023  Hospital Length of Stay: 0 days  Discharge Date and Time:  02/07/2023 11:19 AM  Attending Physician: Josiah Terry MD  Discharging Provider: Isidro Osullivan MD  Primary Care Physician: Gordy Cordero MD    HPI: 57 year old female with paroxysmal AFib s/p PVI, HTN, HLD, DM, mild AS, obesity referred for an abnormal stress test.     She was seen by Dr Kilgore in October for fatigue and NAPIER. Holter showed no recurrence of arrhythmia and PET stress was abnormal showing a reversible defect in the mid-apical anteroseptum.     Today, she continues to feel fatigued and SOB. She has significant NAPIER with ADLs around the house (sweeping, mopping, etc). Walkin around the house also causes her NAPIER that resolves quickly with rest. She does have twinges of chest discomfort randomly with exertion, but nothing more substantial than that. No leg swelling weight gain, orthopnea, PND. No bleeding recently while on Eliquis. She has lost 40 lbs in the past year.     Of note, she had 2 episodes of severe blood loss anemia. After her ablation with Dr Hawley in 2020 she had a L CFA injury requiring multiple pRBCs and vascular surgery intervention. No more sticks to L CFA. She also have severe vaginal bleeding that needed D&C and hysterectomy 8/2022. She remains on Eliquis now and no other bleeding. Hb 9.5 from 2 months ago.        Procedure(s) (LRB):  Left heart cath (Left)     Hospital Course (synopsis of major diagnoses, care, treatment, and services provided during the course of the hospital stay): Successful coronary angiogram. She has non obstructive coronary artery disease, mainly of LCX, 65% stenosis. Would not explain her symptoms or PET findings of LAD territory. Recommend cardiac rehab referral, Continue medical management, Risk factor reduction, Follow-up with outpatient  cardiologist. Continue ASA for CAD. Needs to loose wt and increase exercise.      Pending Diagnostic Studies:       None          There are no hospital problems to display for this patient.      Discharged Condition: good    Follow Up:    Patient Instructions:   No discharge procedures on file.  Medications:  Reconciled Home Medications:      Medication List        CONTINUE taking these medications      acetaminophen 500 MG tablet  Commonly known as: TYLENOL  Take 2 tablets (1,000 mg) by mouth at onset of pain or headache, may repeat if needed.     ALPRAZolam 0.5 MG tablet  Commonly known as: XANAX  Take 1 tablet (0.5 mg total) by mouth 2 (two) times daily as needed for Anxiety.     aspirin 81 MG EC tablet  Commonly known as: ECOTRIN  Take 1 tablet (81 mg total) by mouth once daily.     * atorvastatin 40 MG tablet  Commonly known as: LIPITOR  Take 1 tablet (40 mg total) by mouth once daily. Patient needs to schedule an appointment in the consult cardiology clinic.     * atorvastatin 40 MG tablet  Commonly known as: LIPITOR  Take 1 tablet (40 mg total) by mouth once daily.     b complex vitamins capsule  Take 1 capsule by mouth once daily.     colchicine 0.6 mg tablet  Commonly known as: COLCRYS  For gout attack: Take TWO tablets by mouth, then, 2 hours later, take ONE additional tablet. Then take 1 tablet twice daily only if still needed for gout pain.     DULoxetine 60 MG capsule  Commonly known as: CYMBALTA  Take 2 capsules (120 mg total) by mouth once daily.     ELIQUIS 5 mg Tab  Generic drug: apixaban  Take 1 tablet (5 mg total) by mouth 2 (two) times daily.     ferrous sulfate 142 mg (45 mg iron) Tbsr  Commonly known as: SLOW RELEASE IRON  Take 1 tablet (142 mg total) by mouth once daily. FOR 7 DAY THEN INCREASE TO TWICE DAILY AS TOLERATED     furosemide 40 MG tablet  Commonly known as: LASIX  Take 1 tablet (40 mg total) by mouth 2 (two) times daily. Resume as needed for edema until followup with your PCP.      joxdvrrz-jedg-arc3-C-radha-bosw 750 mg-644 mg- 30 mg-1 mg Tab  Take 1 tablet by mouth once daily.     ibuprofen 600 MG tablet  Commonly known as: ADVIL,MOTRIN  Take 1 tablet (600 mg total) by mouth every 6 (six) hours as needed for Pain.     lisinopriL 40 MG tablet  Commonly known as: PRINIVIL,ZESTRIL  Take 1 tablet (40 mg total) by mouth once daily.     MEGARED OMEGA-3 KRILL OIL ORAL  Take 1 capsule by mouth once daily.     melatonin 10 mg Tab  Take 1-2 tablets by mouth nightly as needed (Sleep).     metoprolol succinate 50 MG 24 hr tablet  Commonly known as: TOPROL-XL  Take 1 tablet (50 mg total) by mouth 2 (two) times daily.     multivitamin per tablet  Commonly known as: THERAGRAN  Take 1 tablet by mouth once daily.     NIFEdipine 90 MG (OSM) 24 hr tablet  Commonly known as: PROCARDIA-XL  Take 1 tablet (90 mg total) by mouth once daily. HOLD UNTIL FOLLOW-UP WITH YOUR PCP.     omeprazole 20 MG capsule  Commonly known as: PRILOSEC  TAKE 1 CAPSULE BY MOUTH ONCE DAILY     potassium chloride SA 20 MEQ tablet  Commonly known as: K-DUR,KLOR-CON  Take 1 tablet (20 mEq total) by mouth once daily. ONLY TAKE WITH LASIX.     predniSONE 10 MG tablet  Commonly known as: DELTASONE  Take 1 tablet by mouth daily     propafenone 225 MG Tab  Commonly known as: RYTHMOL  Take 1 tablet (225 mg total) by mouth every 8 (eight) hours.     traZODone 100 MG tablet  Commonly known as: DESYREL  Take 1 tablet (100 mg total) by mouth nightly as needed for Insomnia.     ULTRA-LIGHT ROLLATOR Misc  Generic drug: walker  As directed           * This list has 2 medication(s) that are the same as other medications prescribed for you. Read the directions carefully, and ask your doctor or other care provider to review them with you.                STOP taking these medications      clopidogreL 75 mg tablet  Commonly known as: PLAVIX     oxyCODONE-acetaminophen 7.5-325 mg per tablet  Commonly known as: PERCOCET            ASK your doctor about these  medications      SENNA 8.6 mg tablet  Generic drug: senna  Take 1 tablet by mouth 2 (two) times daily.              Time spent on the discharge of patient: 20 minutes    Isidro Osullivan MD  Interventional Cardiology  [facility] - Cath Lab

## 2023-02-07 NOTE — PROGRESS NOTES
Patient discharged per MD orders. Instructions given on medications, wound care, activity, signs of infection, when to call MD, and follow up appointments. Pt verbalized understanding.  Patient AAOx3, VSS, no c/o pain or discomfort at this time. Telemetry and PIV removed. Patient left unit via wheelchair with transport to meet family in front of hospital.

## 2023-02-07 NOTE — Clinical Note
80 ml of contrast were injected throughout the case. 120 mL of contrast was the total wasted during the case. 200 mL was the total amount used during the case.

## 2023-02-07 NOTE — INTERVAL H&P NOTE
The patient has been examined and the H&P has been reviewed:    I concur with the findings and no changes have occurred since H&P was written.    Anesthesia/Surgery risks, benefits and alternative options discussed and understood by patient/family.        Assessment:      1. Abnormal nuclear stress test          2. Essential hypertension          3. Atrial Fibrillation (paroxysmal)          4. Mild aortic stenosis          5. Pure hypercholesterolemia          6. Type 2 diabetes mellitus with diabetic polyneuropathy, without long-term current use of insulin          7. Morbid obesity with BMI of 45.0-49.9, adult          8. Abnormal stress test  Ambulatory referral/consult to Interventional Cardiology             Plan:   Abnormal nuclear stress test  --LHC +/- PCI, patient is a LIZ candidate  - Anti-platelet Therapy: ASA / Plavix; starting maintenance doses today; will need 300 Plavix x1 day of angiogram and PRU  - Access: Right radial (sofiya's test normal)  - Catheters: JL5, JR4  - Creatinine/CrCl: 1.1 (8/2022)  - Allergies: No shellfish / Iodine allergy  - Pre-Hydration: NS  - Pre-Op Med: Bendaryl 50mg pO   - All patient's questions were answered.  -The risks, benefits and alternatives of the procedure were explained to the patient.   -The risks of coronary angiography include but are not limited to: bleeding, infection, heart rhythm abnormalities, allergic reactions, kidney injury and potential need for dialysis, stroke and death.   - Should stenting be indicated, the patient has agreed to dual anti-platelet therapy for 1-consecutive year with a drug-eluting stent and a minimum of 1-month with the use of a bare metal stent  - Additionally, pt is aware that non-compliance is likely to result in stent clotting with heart attack, heart failure, and/or death  -The risks of moderate sedation include hypotension, respiratory depression, arrhythmias, bronchospasm, and death.   - Informed consent was obtained and the  patient is agreeable to proceed with the procedure.      There are no hospital problems to display for this patient.

## 2023-02-07 NOTE — PROGRESS NOTES
Vasc band removed per protocol. Patient tolerated well. Gauze/tegaderm dressing placed. Will monitor.

## 2023-02-07 NOTE — BRIEF OP NOTE
"    Post Cath Note  Referring Physician: Josiah Terry MD  Procedure: Left heart cath (Left)      : Josiah Terry MD     Referring Physician: Cory Kilgore     All Operators: Surgeon(s):  MD Lorna Mansfield MD Stephen R. Ramee, MD     Preoperative Diagnosis: Abnormal stress test [R94.39]     Postop Diagnosis: Abnormal stress test [R94.39]    Treatments/Procedures: Procedure(s) (LRB):  Left heart cath (Left)    Estimated Blood loss: <50 cc         Access: Right radial    See full report for further details    Intervention:   Successful coronary angiogram. She has non obstructive coronary artery disease, mainly of LCX, 65% stenosis. Would not explain her symptoms or PET findings of LAD territory.     Closure device: Radial band    Post Cath Exam:   BP (!) 119/57 (BP Location: Left arm, Patient Position: Lying)   Pulse (!) 58   Temp 97.9 °F (36.6 °C)   Resp 17   Ht 5' 6" (1.676 m)   Wt 127 kg (280 lb)   LMP 03/25/2022 (Exact Date)   SpO2 (!) 93%   Breastfeeding No   BMI 45.19 kg/m²   No unusual pain, hematoma, thrill or bruit at vascular access site.  Distal pulse present without signs of ischemia.    Recommendations:   - Routine post-cath care  - IVF at 150 cc/hr for 2 hrs  - Cardiac rehab referral, Continue medical management, Risk factor reduction, Follow-up with outpatient cardiologist  - Continue ASA for CAD  - Needs to loose wt and increase exercise    Isidro Osullivan    "

## 2023-02-16 ENCOUNTER — PATIENT MESSAGE (OUTPATIENT)
Dept: ADMINISTRATIVE | Facility: OTHER | Age: 58
End: 2023-02-16
Payer: COMMERCIAL

## 2023-02-19 ENCOUNTER — HOSPITAL ENCOUNTER (EMERGENCY)
Facility: HOSPITAL | Age: 58
Discharge: HOME | End: 2023-02-20
Attending: STUDENT IN AN ORGANIZED HEALTH CARE EDUCATION/TRAINING PROGRAM
Payer: COMMERCIAL

## 2023-02-19 DIAGNOSIS — R06.02 SHORTNESS OF BREATH: ICD-10-CM
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
## 2023-02-19 PROCEDURE — 99283 EMERGENCY DEPT VISIT LOW MDM: CPT | Mod: 25

## 2023-02-19 RX ORDER — LIDOCAINE 50 MG/G
2 PATCH TOPICAL
Status: DISCONTINUED | OUTPATIENT
Start: 2023-02-19 | End: 2023-02-20 | Stop reason: HOSPADM

## 2023-02-19 RX ORDER — IBUPROFEN 600 MG/1
600 TABLET ORAL
Status: COMPLETED | OUTPATIENT
Start: 2023-02-19 | End: 2023-02-20

## 2023-02-19 RX ORDER — HYDROCODONE BITARTRATE AND ACETAMINOPHEN 5; 325 MG/1; MG/1
1 TABLET ORAL
Status: COMPLETED | OUTPATIENT
Start: 2023-02-19 | End: 2023-02-20

## 2023-02-20 VITALS
OXYGEN SATURATION: 98 % | SYSTOLIC BLOOD PRESSURE: 145 MMHG | HEIGHT: 66 IN | WEIGHT: 280 LBS | HEART RATE: 89 BPM | DIASTOLIC BLOOD PRESSURE: 79 MMHG | BODY MASS INDEX: 45 KG/M2 | TEMPERATURE: 98 F | RESPIRATION RATE: 18 BRPM

## 2023-02-20 PROCEDURE — 25000003 PHARM REV CODE 250: Performed by: STUDENT IN AN ORGANIZED HEALTH CARE EDUCATION/TRAINING PROGRAM

## 2023-02-20 RX ORDER — NAPROXEN 500 MG/1
500 TABLET ORAL 2 TIMES DAILY WITH MEALS
Qty: 42 TABLET | Refills: 0 | Status: SHIPPED | OUTPATIENT
Start: 2023-02-20 | End: 2023-03-13

## 2023-02-20 RX ORDER — ACETAMINOPHEN 500 MG
500 TABLET ORAL EVERY 6 HOURS PRN
Qty: 30 TABLET | Refills: 0 | Status: SHIPPED | OUTPATIENT
Start: 2023-02-20 | End: 2023-03-13

## 2023-02-20 RX ADMIN — HYDROCODONE BITARTRATE AND ACETAMINOPHEN 1 TABLET: 5; 325 TABLET ORAL at 12:02

## 2023-02-20 RX ADMIN — IBUPROFEN 600 MG: 600 TABLET, FILM COATED ORAL at 12:02

## 2023-02-20 RX ADMIN — LIDOCAINE 2 PATCH: 50 PATCH CUTANEOUS at 12:02

## 2023-02-20 NOTE — ED PROVIDER NOTES
Encounter Date: 2/19/2023       History     Chief Complaint   Patient presents with    Fall     Patient presents to the ED with reports of having fallen at home and struck the right rib area. Patient complains of having right rib pain. States she is taking blood thinner medications and has shortness of breath on exertion.      57 year old female with a history of afib on anticoagulation presents with R sided chest wall pain after a fall. She says she was carrying groceries when she tripped and fell onto her R chest. No head or neck strike. She says that her SOB occurs due to the pain although she does have baseline SOB. She says that she also has baseline swelling in both of her legs and that she is followed regularly. She denies changes to these and says she is most worried about a rib fx.     Review of patient's allergies indicates:   Allergen Reactions    Flecainide Shortness Of Breath and Swelling    Vancomycin analogues Hives, Itching and Rash     Past Medical History:   Diagnosis Date    Anticoagulant long-term use     Arthritis     Atrial fibrillation     cardioversion    Atrial fibrillation with RVR     Avascular necrosis     L hand    CHF (congestive heart failure)     Depression     Encounter for blood transfusion 07/22/2020    Encounter for blood transfusion 03/2022    GERD (gastroesophageal reflux disease)     Hx of psychiatric care     Hypertension     Iron deficiency anemia 5/7/2022    TIBC 444 with saturated iron 8    Obese     Pre-diabetes 5/7/2022    Psychiatric problem     Sleep apnea     wears cpap     Past Surgical History:   Procedure Laterality Date    ABLATION OF ARRHYTHMOGENIC FOCUS FOR ATRIAL FIBRILLATION N/A 7/20/2020    Procedure: Ablation atrial fibrillation;  Surgeon: Gabriel Hawley MD;  Location: [facility] EP LAB;  Service: Cardiology;  Laterality: N/A;  afib, PVI, RFA, REENA, SHADY, anes, MB, 3 Prep    ABLATION OF ARRHYTHMOGENIC FOCUS FOR ATRIAL FIBRILLATION N/A 1/24/2022    Procedure:  Ablation atrial fibrillation;  Surgeon: Gabriel Hawley MD;  Location: [facility] EP LAB;  Service: Cardiology;  Laterality: N/A;  afib/afl, PVI (re-do)/CTI, RFA, REENA (cx if SR), SHADY, anes, MB, 3 Prep    APPLICATION OF WOUND VACUUM-ASSISTED CLOSURE DEVICE Left 8/3/2020    Procedure: APPLICATION, WOUND VAC;  Surgeon: SEMAJ Márquez III, MD;  Location: [facility] OR 2ND FLR;  Service: Peripheral Vascular;  Laterality: Left;    APPLICATION OF WOUND VACUUM-ASSISTED CLOSURE DEVICE Left 8/6/2020    Procedure: APPLICATION, WOUND VAC;  Surgeon: SEMAJ Márquez III, MD;  Location: [facility] OR 2ND FLR;  Service: Peripheral Vascular;  Laterality: Left;    CARPAL TUNNEL RELEASE Right 6/10/2020    Procedure: RELEASE, CARPAL TUNNEL - RIGHT;  Surgeon: Adelaida Hall MD;  Location: [facility] OR;  Service: Orthopedics;  Laterality: Right;  GENERAL AND REGIONAL    CARPAL TUNNEL RELEASE Left 5/5/2021    Procedure: RELEASE, CARPAL TUNNEL, LEFT;  Surgeon: Adelaida Hall MD;  Location: [facility] OR;  Service: Orthopedics;  Laterality: Left;  GENERAL & REGIONAL IN PACU    CLOSURE OF WOUND Left 8/6/2020    Procedure: CLOSURE, WOUND;  Surgeon: SEMAJ Márquez III, MD;  Location: [facility] OR 2ND FLR;  Service: Peripheral Vascular;  Laterality: Left;  Complex    COLONOSCOPY N/A 8/17/2021    Procedure: COLONOSCOPY Suprep;  Surgeon: Loco Deluca MD;  Location: [facility];  Service: Endoscopy;  Laterality: N/A;    ESOPHAGOGASTRODUODENOSCOPY N/A 8/17/2021    Procedure: EGD (ESOPHAGOGASTRODUODENOSCOPY);  Surgeon: Loco Deluca MD;  Location: [facility];  Service: Endoscopy;  Laterality: N/A;  Patient is schedule to have her Covid test on 08/14/2021 at the [facility] @ 9:30am. [place].    EXPLORATION OF FEMORAL ARTERY Left 7/21/2020    Procedure: EXPLORATION, ARTERY, FEMORAL;  Surgeon: SEMAJ Márquez III, MD;  Location: [facility] OR [facility] FLR;  Service: Peripheral Vascular;  Laterality: Left;  1. Urgent Direct repair, L SFA branch laceration     FOOT SURGERY      HYSTEROSCOPY WITH DILATION AND CURETTAGE OF UTERUS N/A 2/19/2022    Procedure: HYSTEROSCOPY, WITH DILATION AND CURETTAGE OF UTERUS;  Surgeon: Shane Palomo MD;  [address];  Service: OB/GYN;  Laterality: N/A;    INCISION AND DRAINAGE OF KNEE Left 5/12/2022    Procedure: INCISION AND DRAINAGE, Prepatellar bursectomy.;  Surgeon: Dre Guzmán MD;  Location: [address];  Service: Orthopedics;  Laterality: Left;    LEFT HEART CATHETERIZATION Left 2/7/2023    Procedure: Left heart cath;  Surgeon: Josiah Terry MD;  Location: [facility] CATH LAB;  Service: Cardiology;  Laterality: Left;  borderline moderate bleeding risk 6.3%    ROBOT-ASSISTED LAPAROSCOPIC ABDOMINAL HYSTERECTOMY USING DA KEIRY XI N/A 8/9/2022    Procedure: XI ROBOTIC HYSTERECTOMY;  Surgeon: Yolanda Barriga MD;  Location: [facility];  Service: OB/GYN;  Laterality: N/A;  dual console requested    ROBOT-ASSISTED LAPAROSCOPIC SALPINGO-OOPHORECTOMY Bilateral 8/9/2022    Procedure: ROBOTIC SALPINGO-OOPHORECTOMY;  Surgeon: Yolanda Barriga MD;  Location: [facility];  Service: OB/GYN;  Laterality: Bilateral;    TREATMENT OF CARDIAC ARRHYTHMIA N/A 1/29/2020    Procedure: CARDIOVERSION;  Surgeon: Gabriel Hawley MD;  Location: [facility] EP LAB;  Service: Cardiology;  Laterality: N/A;  af, demi, dccv, anes, mb, 345    TREATMENT OF CARDIAC ARRHYTHMIA  1/24/2022    Procedure: Cardioversion or Defibrillation;  Surgeon: Gabriel Hawley MD;  Location: [facility] EP LAB;  Service: Cardiology;;    WOUND DEBRIDEMENT Left 8/6/2020    Procedure: DEBRIDEMENT, WOUND;  Surgeon: SEMAJ Márquez III, MD;  Location: [address];  Service: Peripheral Vascular;  Laterality: Left;     Family History   Problem Relation Age of Onset    Cataracts Mother     Hypertension Mother     Thyroid disease Mother     Diabetes Father     Hypertension Father     Hypertension Sister     Hypertension Brother     Amblyopia Neg Hx     Blindness Neg Hx     Cancer Neg  Hx     Glaucoma Neg Hx     Macular degeneration Neg Hx     Retinal detachment Neg Hx     Strabismus Neg Hx     Stroke Neg Hx     Breast cancer Neg Hx     Colon cancer Neg Hx     Ovarian cancer Neg Hx      Social History     Tobacco Use    Smoking status: Former     Types: Cigarettes     Quit date: 7/1/2019     Years since quitting: 3.6    Smokeless tobacco: Never   Substance Use Topics    Alcohol use: No    Drug use: No     Review of Systems   All other systems reviewed and are negative.    Physical Exam     Initial Vitals [02/19/23 2208]   BP Pulse Resp Temp SpO2   137/73 91 20 97.5 °F (36.4 °C) 97 %      MAP       --         Physical Exam    Nursing note and vitals reviewed.  Constitutional: She appears well-developed and well-nourished. She is not diaphoretic. No distress.   HENT:   Head: Normocephalic and atraumatic.   Eyes: EOM are normal. Pupils are equal, round, and reactive to light.   Neck: Trachea normal and phonation normal. Neck supple. Carotid bruit is not present. No JVD present.   Normal range of motion.  Cardiovascular:  Normal rate, regular rhythm, normal heart sounds and intact distal pulses.     Exam reveals no gallop and no friction rub.       No murmur heard.  Pulmonary/Chest: Breath sounds normal. No stridor. No respiratory distress. She has no wheezes. She has no rhonchi. She has no rales. She exhibits no tenderness.   Abdominal: Abdomen is soft. Bowel sounds are normal. She exhibits no distension and no mass. There is no abdominal tenderness. There is no rebound and no guarding.   Musculoskeletal:         General: Edema present.      Cervical back: Normal range of motion and neck supple.      Right lower leg: No swelling. No edema.      Left lower leg: No swelling. No edema.      Comments: R anterior chest wall tenderness with palpation. No flail.      Neurological: She is alert and oriented to person, place, and time. GCS score is 15. GCS eye subscore is 4. GCS verbal subscore is 5. GCS  motor subscore is 6.   Skin: Skin is warm and dry. Capillary refill takes less than 2 seconds.   Psychiatric: She has a normal mood and affect. Thought content normal.       ED Course   Procedures  Labs Reviewed - No data to display       Imaging Results    None          Medications   HYDROcodone-acetaminophen 5-325 mg per tablet 1 tablet (1 tablet Oral Given 2/20/23 0013)   ibuprofen tablet 600 mg (600 mg Oral Given 2/20/23 0013)     Medical Decision Making:   Initial Assessment:   Hemodynamically stable. Afebrile. Phonating and protecting the airway spontaneously. No clinical evidence for cardiovascular instability or impending airway compromise. Examination as above. Will obtain dedicated CT of chest as the patient is very concerned about a rib fx. Will provide pain medication as well. Her SOB is only associated with her pain which outside of this, she is not SOB. No chest pain. She denies any changes otherwise to her condition and says tht she was feeling herself before the fall and confirms the fall was due to tripping. Will hold off on labs and pursue imaging and analgesics.            ED Course as of 02/20/23 0510   Mon Feb 20, 2023   0004 Pt does not want CT [BG]   0004 Ambulated to the bathroom.  [BG]   0020 Patient did not want to stay for the imaging. She did receive her pain medication. The patient was reassessed and on subsequent re-evaluation, they were subjectively feeling better. They were resting comfortably and in no acute distress. I discussed the laboratory and diagnostic findings with the patient. Education was provided and all questions were answered. As discussed, they were recommended to follow up with their primary care physician within the next few days and to return to the emergency department sooner for any new or worsening. They acknowledged and verbalized agreement to the treatment plan. The patient was discharged home in stable condition.     DISCLAIMER: This note was prepared with  MModal voice recognition transcription software. Garbled syntax, mangled pronouns, and other bizarre constructions may be attributed to that software system.     [BG]      ED Course User Index  [BG] Yao Hoffman MD                 Clinical Impression:   Final diagnoses:  [W19.XXXA] Fall  [R06.02] Shortness of breath   Chest wall pain     ED Disposition Condition    Discharge Stable          ED Prescriptions       Medication Sig Dispense Start Date End Date Auth. Provider    naproxen (NAPROSYN) 500 MG tablet Take 1 tablet (500 mg total) by mouth 2 (two) times daily with meals. for 21 days 42 tablet 2/20/2023 3/13/2023 Yao Hoffman MD    acetaminophen (TYLENOL) 500 MG tablet Take 1 tablet (500 mg total) by mouth every 6 (six) hours as needed for Pain. 30 tablet 2/20/2023 3/13/2023 Yao Hoffman MD          Follow-up Information       Follow up With Specialties Details Why Contact Info    Gordy Cordero MD Internal Medicine Go to   [address] [facility]  [address]  [place] [address] [phone]               Yao Hoffamn MD  02/20/23 4648

## 2023-02-20 NOTE — ED NOTES
Pt c/o R flank/ rib pain s/p trip and fall. Pt thinks she broke sp,e ribs. No head/ neck/ back trauma. Denies other complaints. +A/O x 4 w/ ABCs intact, NAD. VSS.     Review of patient's allergies indicates:   Allergen Reactions    Flecainide Shortness Of Breath and Swelling    Vancomycin analogues Hives, Itching and Rash        Patient has verified the spelling of their name and  on armband.   APPEARANCE: Patient is alert, calm, oriented x 4, and does not appear distressed.  SKIN: Skin is normal for race, warm, and dry. Normal skin turgor and mucous membranes moist.  CARDIAC: Normal rate and rhythm, no murmur heard.   RESPIRATORY:Normal rate and effort. Breath sounds clear bilaterally throughout chest. Respirations are equal and unlabored.    GASTRO: Bowel sounds normal, abdomen is soft, no tenderness, and no abdominal distention.  MUSCLE: Full ROM. No bony tenderness or soft tissue tenderness. No obvious deformity.  PERIPHERAL VASCULAR: peripheral pulses present. Normal cap refill. No edema. Warm to touch.  NEURO: 5/5 strength major flexors/extensors bilaterally. Sensory intact to light touch bilaterally. Joon coma scale: eyes open spontaneously-4, oriented & converses-5, obeys commands-6. No neurological abnormalities.   MENTAL STATUS: awake, alert and aware of environment.  : Voids without complication      ED orders in progress. Pt SR up x 2. Bed in lowest position with wheels locked. Call bell within reach of pt.

## 2023-02-20 NOTE — DISCHARGE INSTRUCTIONS
Follow up with your PCP within the next few days for a recheck. Return to the ED for any new or worsening.

## 2023-02-24 ENCOUNTER — HOSPITAL ENCOUNTER (OUTPATIENT)
Dept: RADIOLOGY | Facility: HOSPITAL | Age: 58
Discharge: HOME | End: 2023-02-24
Attending: INTERNAL MEDICINE
Payer: COMMERCIAL
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
## 2023-02-24 DIAGNOSIS — R07.9 RIGHT-SIDED CHEST PAIN: ICD-10-CM

## 2023-02-24 PROCEDURE — 71100 X-RAY EXAM RIBS UNI 2 VIEWS: CPT | Mod: TC,FY,RT

## 2023-02-24 PROCEDURE — 71100 XR RIBS 2 VIEW RIGHT: ICD-10-PCS | Mod: 26,RT,, | Performed by: RADIOLOGY

## 2023-02-24 PROCEDURE — 71100 X-RAY EXAM RIBS UNI 2 VIEWS: CPT | Mod: 26,RT,, | Performed by: RADIOLOGY

## 2023-03-14 NOTE — PROGRESS NOTES
Subjective:           Chief Complaint: Establish Care, Follow-up, and Shortness of Breath        HPI    57 year old female with CAD ([facility] 1/23 with distal LCx 70-80%, distal RCA 50%), paroxysmal AFib s/p PVI, HTN, HLD, DM, mild AS, obesity here for follow-up.    She previously reported extreme fatigue, could not complete ADLs and had to move in with her sister. Just walking to her bathroom made her short of breath. She had an abnormal stress test and subsequent LHC with CAD that requires medical management. Her Holter did not reveal AFib but she did have a 5.6% PAC burden, previously had a 10% PVC burden.    HPI today    She has good days and bad days and continues to have shortness of breath and fatigue. She rides the scooter in the grocery store. She denies chest pain/pressure/tightness/discomfort, orthopnea, PND, sustained palpitations or syncope. She has occasional peripheral edema.    She lost her balance when carrying groceries and fell and broke a rib.      Review of Systems   Constitutional:  Negative for fever.   HENT:  Negative for nosebleeds.    Eyes:  Negative for visual disturbance.   Respiratory:  Positive for shortness of breath.    Cardiovascular:  Negative for palpitations.   Gastrointestinal:  Negative for blood in stool.   Genitourinary:  Negative for hematuria.   Musculoskeletal:  Positive for gait problem.   Integumentary:  Negative for rash.   Neurological:  Negative for syncope.       Objective:      Vitals:    03/16/23 1502   BP: 133/82   Pulse: 80         Physical Exam  Constitutional:       Appearance: She is well-developed. She is not diaphoretic.   HENT:      Head: Normocephalic and atraumatic.   Eyes:      Pupils: Pupils are equal, round, and reactive to light.   Neck:      Vascular: No carotid bruit or JVD.   Cardiovascular:      Rate and Rhythm: Normal rate and regular rhythm.      Heart sounds: Normal heart sounds. Heart sounds not distant. No murmur heard.    No friction rub. No  gallop. No S3 or S4 sounds.   Pulmonary:      Effort: Pulmonary effort is normal. No respiratory distress.      Breath sounds: Normal breath sounds. No wheezing.   Abdominal:      General: Bowel sounds are normal. There is no distension.      Palpations: Abdomen is soft.      Tenderness: There is no abdominal tenderness.   Musculoskeletal:         General: No swelling.      Cervical back: Normal range of motion.   Skin:     General: Skin is warm.      Findings: No erythema.   Neurological:      Mental Status: She is alert and oriented to person, place, and time.   Psychiatric:         Behavior: Behavior normal.       Assessment & Plan     CAD  Continue beta blocker, ACE  Increase atorvastatin to 80 mg daily  LDL 92    Atrial Fibrillation (paroxysmal)  Managed by EP  CHADS VASc 3  HAS BLED 1  Taking apixaban, metoprolol  5.6% PAC burden, previously had 10% PVC burden, may benefit from additional beta blockade - increase to 150 mg MTP succinate (2 x 50 mg AM 1 x 50 mg PM)    Essential hypertension  133/82  Continue metoprolol, lisinopril, nifedipine    Pure hypercholesterolemia  Continue statin    The 10-year ASCVD risk score (Devendra DK, et al., 2019) is: 6.4%    Values used to calculate the score:      Age: 57 years      Sex: Female      Is Non- : No      Diabetic: Yes      Tobacco smoker: No      Systolic Blood Pressure: 133 mmHg      Is BP treated: Yes      HDL Cholesterol: 53 mg/dL      Total Cholesterol: 164 mg/dL      Type 2 diabetes mellitus with diabetic polyneuropathy, without long-term current use of insulin  Managed by PCP    ERENDIRA (obstructive sleep apnea)  Continue CPAP    Morbid obesity  We discussed diet and lifestyle modification. She lost 60 lbs with diet changes previously  Interested in a referral to bariatrics - sent    Mild aortic stenosis  TTE every 3-5 years ([id number])

## 2023-03-16 ENCOUNTER — OFFICE VISIT (OUTPATIENT)
Dept: CARDIOLOGY | Facility: CLINIC | Age: 58
End: 2023-03-16
Payer: COMMERCIAL

## 2023-03-16 VITALS
WEIGHT: 280.44 LBS | OXYGEN SATURATION: 97 % | SYSTOLIC BLOOD PRESSURE: 133 MMHG | DIASTOLIC BLOOD PRESSURE: 82 MMHG | BODY MASS INDEX: 45.07 KG/M2 | HEIGHT: 66 IN | HEART RATE: 80 BPM

## 2023-03-16 DIAGNOSIS — I10 ESSENTIAL HYPERTENSION: ICD-10-CM

## 2023-03-16 DIAGNOSIS — E11.42 TYPE 2 DIABETES MELLITUS WITH DIABETIC POLYNEUROPATHY, WITHOUT LONG-TERM CURRENT USE OF INSULIN: ICD-10-CM

## 2023-03-16 DIAGNOSIS — I25.118 CORONARY ARTERY DISEASE OF NATIVE ARTERY OF NATIVE HEART WITH STABLE ANGINA PECTORIS: Primary | ICD-10-CM

## 2023-03-16 DIAGNOSIS — I48.0 PAF (PAROXYSMAL ATRIAL FIBRILLATION): ICD-10-CM

## 2023-03-16 DIAGNOSIS — I49.3 PVC (PREMATURE VENTRICULAR CONTRACTION): ICD-10-CM

## 2023-03-16 DIAGNOSIS — I35.0 MILD AORTIC STENOSIS: Chronic | ICD-10-CM

## 2023-03-16 DIAGNOSIS — I48.91 ATRIAL FIBRILLATION, UNSPECIFIED TYPE: ICD-10-CM

## 2023-03-16 DIAGNOSIS — E66.01 MORBID OBESITY WITH BMI OF 45.0-49.9, ADULT: ICD-10-CM

## 2023-03-16 DIAGNOSIS — E78.00 PURE HYPERCHOLESTEROLEMIA: ICD-10-CM

## 2023-03-16 PROCEDURE — 99214 PR OFFICE/OUTPT VISIT, EST, LEVL IV, 30-39 MIN: ICD-10-PCS | Mod: S$GLB,,, | Performed by: INTERNAL MEDICINE

## 2023-03-16 PROCEDURE — 3072F PR LOW RISK FOR RETINOPATHY: ICD-10-PCS | Mod: CPTII,S$GLB,, | Performed by: INTERNAL MEDICINE

## 2023-03-16 PROCEDURE — 3079F PR MOST RECENT DIASTOLIC BLOOD PRESSURE 80-89 MM HG: ICD-10-PCS | Mod: CPTII,S$GLB,, | Performed by: INTERNAL MEDICINE

## 2023-03-16 PROCEDURE — 3008F PR BODY MASS INDEX (BMI) DOCUMENTED: ICD-10-PCS | Mod: CPTII,S$GLB,, | Performed by: INTERNAL MEDICINE

## 2023-03-16 PROCEDURE — 3079F DIAST BP 80-89 MM HG: CPT | Mod: CPTII,S$GLB,, | Performed by: INTERNAL MEDICINE

## 2023-03-16 PROCEDURE — 4010F ACE/ARB THERAPY RXD/TAKEN: CPT | Mod: CPTII,S$GLB,, | Performed by: INTERNAL MEDICINE

## 2023-03-16 PROCEDURE — 1159F PR MEDICATION LIST DOCUMENTED IN MEDICAL RECORD: ICD-10-PCS | Mod: CPTII,S$GLB,, | Performed by: INTERNAL MEDICINE

## 2023-03-16 PROCEDURE — 4010F PR ACE/ARB THEARPY RXD/TAKEN: ICD-10-PCS | Mod: CPTII,S$GLB,, | Performed by: INTERNAL MEDICINE

## 2023-03-16 PROCEDURE — 3008F BODY MASS INDEX DOCD: CPT | Mod: CPTII,S$GLB,, | Performed by: INTERNAL MEDICINE

## 2023-03-16 PROCEDURE — 99999 PR PBB SHADOW E&M-EST. PATIENT-LVL V: ICD-10-PCS | Mod: PBBFAC,,, | Performed by: INTERNAL MEDICINE

## 2023-03-16 PROCEDURE — 1160F RVW MEDS BY RX/DR IN RCRD: CPT | Mod: CPTII,S$GLB,, | Performed by: INTERNAL MEDICINE

## 2023-03-16 PROCEDURE — 3075F SYST BP GE 130 - 139MM HG: CPT | Mod: CPTII,S$GLB,, | Performed by: INTERNAL MEDICINE

## 2023-03-16 PROCEDURE — 3075F PR MOST RECENT SYSTOLIC BLOOD PRESS GE 130-139MM HG: ICD-10-PCS | Mod: CPTII,S$GLB,, | Performed by: INTERNAL MEDICINE

## 2023-03-16 PROCEDURE — 3072F LOW RISK FOR RETINOPATHY: CPT | Mod: CPTII,S$GLB,, | Performed by: INTERNAL MEDICINE

## 2023-03-16 PROCEDURE — 1159F MED LIST DOCD IN RCRD: CPT | Mod: CPTII,S$GLB,, | Performed by: INTERNAL MEDICINE

## 2023-03-16 PROCEDURE — 1160F PR REVIEW ALL MEDS BY PRESCRIBER/CLIN PHARMACIST DOCUMENTED: ICD-10-PCS | Mod: CPTII,S$GLB,, | Performed by: INTERNAL MEDICINE

## 2023-03-16 PROCEDURE — 99214 OFFICE O/P EST MOD 30 MIN: CPT | Mod: S$GLB,,, | Performed by: INTERNAL MEDICINE

## 2023-03-16 PROCEDURE — 99999 PR PBB SHADOW E&M-EST. PATIENT-LVL V: CPT | Mod: PBBFAC,,, | Performed by: INTERNAL MEDICINE

## 2023-03-16 RX ORDER — ATORVASTATIN CALCIUM 80 MG/1
80 TABLET, FILM COATED ORAL DAILY
Qty: 90 TABLET | Refills: 3 | Status: SHIPPED | OUTPATIENT
Start: 2023-03-16 | End: 2024-01-29 | Stop reason: SDUPTHER

## 2023-03-16 RX ORDER — METOPROLOL SUCCINATE 50 MG/1
150 TABLET, EXTENDED RELEASE ORAL 2 TIMES DAILY
Qty: 180 TABLET | Refills: 3 | Status: SHIPPED | OUTPATIENT
Start: 2023-03-16 | End: 2023-03-28 | Stop reason: SDUPTHER

## 2023-03-17 ENCOUNTER — TELEPHONE (OUTPATIENT)
Dept: BARIATRICS | Facility: CLINIC | Age: 58
End: 2023-03-17
Payer: COMMERCIAL

## 2023-03-17 NOTE — TELEPHONE ENCOUNTER
Notified patient of the date & time of financial phone call appt.  Pt aware appt is a telephone call.    Dashboard updated  Appt.[id number]

## 2023-03-28 ENCOUNTER — PATIENT MESSAGE (OUTPATIENT)
Dept: CARDIOLOGY | Facility: CLINIC | Age: 58
End: 2023-03-28
Payer: COMMERCIAL

## 2023-03-28 DIAGNOSIS — I48.91 ATRIAL FIBRILLATION, UNSPECIFIED TYPE: ICD-10-CM

## 2023-03-28 DIAGNOSIS — I10 ESSENTIAL HYPERTENSION: Chronic | ICD-10-CM

## 2023-03-29 RX ORDER — FUROSEMIDE 40 MG/1
40 TABLET ORAL 2 TIMES DAILY
Qty: 60 TABLET | Refills: 11 | Status: SHIPPED | OUTPATIENT
Start: 2023-03-29 | End: 2024-04-19

## 2023-03-29 RX ORDER — NIFEDIPINE 90 MG/1
90 TABLET, EXTENDED RELEASE ORAL DAILY
Qty: 30 TABLET | Refills: 11 | Status: SHIPPED | OUTPATIENT
Start: 2023-03-29 | End: 2023-09-08

## 2023-03-29 RX ORDER — METOPROLOL SUCCINATE 50 MG/1
200 TABLET, EXTENDED RELEASE ORAL DAILY
Qty: 180 TABLET | Refills: 3 | Status: SHIPPED | OUTPATIENT
Start: 2023-03-29 | End: 2023-08-29 | Stop reason: SDUPTHER

## 2023-03-31 ENCOUNTER — OFFICE VISIT (OUTPATIENT)
Dept: HEMATOLOGY/ONCOLOGY | Facility: CLINIC | Age: 58
End: 2023-03-31
Payer: COMMERCIAL

## 2023-03-31 ENCOUNTER — LAB VISIT (OUTPATIENT)
Dept: LAB | Facility: HOSPITAL | Age: 58
End: 2023-03-31
Attending: INTERNAL MEDICINE
Payer: COMMERCIAL

## 2023-03-31 VITALS
HEART RATE: 72 BPM | RESPIRATION RATE: 18 BRPM | TEMPERATURE: 98 F | BODY MASS INDEX: 45.83 KG/M2 | WEIGHT: 285.19 LBS | SYSTOLIC BLOOD PRESSURE: 120 MMHG | DIASTOLIC BLOOD PRESSURE: 71 MMHG | HEIGHT: 66 IN | OXYGEN SATURATION: 95 %

## 2023-03-31 DIAGNOSIS — D64.9 NORMOCYTIC ANEMIA: ICD-10-CM

## 2023-03-31 DIAGNOSIS — D50.0 IRON DEFICIENCY ANEMIA DUE TO CHRONIC BLOOD LOSS: ICD-10-CM

## 2023-03-31 DIAGNOSIS — D64.9 NORMOCYTIC ANEMIA: Primary | ICD-10-CM

## 2023-03-31 LAB
ALBUMIN SERPL BCP-MCNC: 2.9 G/DL (ref 3.5–5.2)
ALP SERPL-CCNC: 247 U/L (ref 55–135)
ALT SERPL W/O P-5'-P-CCNC: 11 U/L (ref 10–44)
ANION GAP SERPL CALC-SCNC: 12 MMOL/L (ref 8–16)
AST SERPL-CCNC: 45 U/L (ref 10–40)
BASOPHILS # BLD AUTO: 0.07 K/UL (ref 0–0.2)
BASOPHILS NFR BLD: 1 % (ref 0–1.9)
BILIRUB SERPL-MCNC: 1.4 MG/DL (ref 0.1–1)
BUN SERPL-MCNC: 8 MG/DL (ref 6–20)
CALCIUM SERPL-MCNC: 9.1 MG/DL (ref 8.7–10.5)
CHLORIDE SERPL-SCNC: 102 MMOL/L (ref 95–110)
CO2 SERPL-SCNC: 27 MMOL/L (ref 23–29)
CREAT SERPL-MCNC: 0.8 MG/DL (ref 0.5–1.4)
DIFFERENTIAL METHOD: ABNORMAL
EOSINOPHIL # BLD AUTO: 0.2 K/UL (ref 0–0.5)
EOSINOPHIL NFR BLD: 2.1 % (ref 0–8)
ERYTHROCYTE [DISTWIDTH] IN BLOOD BY AUTOMATED COUNT: 20.8 % (ref 11.5–14.5)
EST. GFR  (NO RACE VARIABLE): >60 ML/MIN/1.73 M^2
FERRITIN SERPL-MCNC: 19 NG/ML (ref 20–300)
FOLATE SERPL-MCNC: 5.7 NG/ML (ref 4–24)
GLUCOSE SERPL-MCNC: 104 MG/DL (ref 70–110)
HAPTOGLOB SERPL-MCNC: 196 MG/DL (ref 30–250)
HCT VFR BLD AUTO: 30.8 % (ref 37–48.5)
HGB BLD-MCNC: 8.3 G/DL (ref 12–16)
IMM GRANULOCYTES # BLD AUTO: 0.03 K/UL (ref 0–0.04)
IMM GRANULOCYTES NFR BLD AUTO: 0.4 % (ref 0–0.5)
IRON SERPL-MCNC: 49 UG/DL (ref 30–160)
LDH SERPL L TO P-CCNC: 209 U/L (ref 110–260)
LYMPHOCYTES # BLD AUTO: 1 K/UL (ref 1–4.8)
LYMPHOCYTES NFR BLD: 13.3 % (ref 18–48)
MCH RBC QN AUTO: 22.7 PG (ref 27–31)
MCHC RBC AUTO-ENTMCNC: 26.9 G/DL (ref 32–36)
MCV RBC AUTO: 84 FL (ref 82–98)
MONOCYTES # BLD AUTO: 0.5 K/UL (ref 0.3–1)
MONOCYTES NFR BLD: 7.2 % (ref 4–15)
NEUTROPHILS # BLD AUTO: 5.4 K/UL (ref 1.8–7.7)
NEUTROPHILS NFR BLD: 76 % (ref 38–73)
NRBC BLD-RTO: 0 /100 WBC
PATH REV BLD -IMP: NORMAL
PLATELET # BLD AUTO: 166 K/UL (ref 150–450)
PMV BLD AUTO: 11.4 FL (ref 9.2–12.9)
POTASSIUM SERPL-SCNC: 3.9 MMOL/L (ref 3.5–5.1)
PROT SERPL-MCNC: 7.1 G/DL (ref 6–8.4)
RBC # BLD AUTO: 3.65 M/UL (ref 4–5.4)
RETICS/RBC NFR AUTO: 3.3 % (ref 0.5–2.5)
SATURATED IRON: 11 % (ref 20–50)
SODIUM SERPL-SCNC: 141 MMOL/L (ref 136–145)
TOTAL IRON BINDING CAPACITY: 459 UG/DL (ref 250–450)
TRANSFERRIN SERPL-MCNC: 310 MG/DL (ref 200–375)
VIT B12 SERPL-MCNC: 981 PG/ML (ref 210–950)
WBC # BLD AUTO: 7.13 K/UL (ref 3.9–12.7)

## 2023-03-31 PROCEDURE — 82607 VITAMIN B-12: CPT | Performed by: INTERNAL MEDICINE

## 2023-03-31 PROCEDURE — 1159F PR MEDICATION LIST DOCUMENTED IN MEDICAL RECORD: ICD-10-PCS | Mod: CPTII,S$GLB,, | Performed by: INTERNAL MEDICINE

## 2023-03-31 PROCEDURE — 1160F PR REVIEW ALL MEDS BY PRESCRIBER/CLIN PHARMACIST DOCUMENTED: ICD-10-PCS | Mod: CPTII,S$GLB,, | Performed by: INTERNAL MEDICINE

## 2023-03-31 PROCEDURE — 3078F PR MOST RECENT DIASTOLIC BLOOD PRESSURE < 80 MM HG: ICD-10-PCS | Mod: CPTII,S$GLB,, | Performed by: INTERNAL MEDICINE

## 2023-03-31 PROCEDURE — 3074F SYST BP LT 130 MM HG: CPT | Mod: CPTII,S$GLB,, | Performed by: INTERNAL MEDICINE

## 2023-03-31 PROCEDURE — 99203 OFFICE O/P NEW LOW 30 MIN: CPT | Mod: S$GLB,,, | Performed by: INTERNAL MEDICINE

## 2023-03-31 PROCEDURE — 99999 PR PBB SHADOW E&M-EST. PATIENT-LVL III: CPT | Mod: PBBFAC,,, | Performed by: INTERNAL MEDICINE

## 2023-03-31 PROCEDURE — 82746 ASSAY OF FOLIC ACID SERUM: CPT | Performed by: INTERNAL MEDICINE

## 2023-03-31 PROCEDURE — 84466 ASSAY OF TRANSFERRIN: CPT | Performed by: INTERNAL MEDICINE

## 2023-03-31 PROCEDURE — 82728 ASSAY OF FERRITIN: CPT | Performed by: INTERNAL MEDICINE

## 2023-03-31 PROCEDURE — 3008F BODY MASS INDEX DOCD: CPT | Mod: CPTII,S$GLB,, | Performed by: INTERNAL MEDICINE

## 2023-03-31 PROCEDURE — 3072F LOW RISK FOR RETINOPATHY: CPT | Mod: CPTII,S$GLB,, | Performed by: INTERNAL MEDICINE

## 2023-03-31 PROCEDURE — 1160F RVW MEDS BY RX/DR IN RCRD: CPT | Mod: CPTII,S$GLB,, | Performed by: INTERNAL MEDICINE

## 2023-03-31 PROCEDURE — 85045 AUTOMATED RETICULOCYTE COUNT: CPT | Performed by: INTERNAL MEDICINE

## 2023-03-31 PROCEDURE — 4010F PR ACE/ARB THEARPY RXD/TAKEN: ICD-10-PCS | Mod: CPTII,S$GLB,, | Performed by: INTERNAL MEDICINE

## 2023-03-31 PROCEDURE — 99203 PR OFFICE/OUTPT VISIT, NEW, LEVL III, 30-44 MIN: ICD-10-PCS | Mod: S$GLB,,, | Performed by: INTERNAL MEDICINE

## 2023-03-31 PROCEDURE — 82525 ASSAY OF COPPER: CPT | Performed by: INTERNAL MEDICINE

## 2023-03-31 PROCEDURE — 99999 PR PBB SHADOW E&M-EST. PATIENT-LVL III: ICD-10-PCS | Mod: PBBFAC,,, | Performed by: INTERNAL MEDICINE

## 2023-03-31 PROCEDURE — 85025 COMPLETE CBC W/AUTO DIFF WBC: CPT | Performed by: INTERNAL MEDICINE

## 2023-03-31 PROCEDURE — 3078F DIAST BP <80 MM HG: CPT | Mod: CPTII,S$GLB,, | Performed by: INTERNAL MEDICINE

## 2023-03-31 PROCEDURE — 3072F PR LOW RISK FOR RETINOPATHY: ICD-10-PCS | Mod: CPTII,S$GLB,, | Performed by: INTERNAL MEDICINE

## 2023-03-31 PROCEDURE — 85060 BLOOD SMEAR INTERPRETATION: CPT | Mod: ,,, | Performed by: PATHOLOGY

## 2023-03-31 PROCEDURE — 1159F MED LIST DOCD IN RCRD: CPT | Mod: CPTII,S$GLB,, | Performed by: INTERNAL MEDICINE

## 2023-03-31 PROCEDURE — 80053 COMPREHEN METABOLIC PANEL: CPT | Performed by: INTERNAL MEDICINE

## 2023-03-31 PROCEDURE — 3074F PR MOST RECENT SYSTOLIC BLOOD PRESSURE < 130 MM HG: ICD-10-PCS | Mod: CPTII,S$GLB,, | Performed by: INTERNAL MEDICINE

## 2023-03-31 PROCEDURE — 83010 ASSAY OF HAPTOGLOBIN QUANT: CPT | Performed by: INTERNAL MEDICINE

## 2023-03-31 PROCEDURE — 4010F ACE/ARB THERAPY RXD/TAKEN: CPT | Mod: CPTII,S$GLB,, | Performed by: INTERNAL MEDICINE

## 2023-03-31 PROCEDURE — 85060 PATHOLOGIST REVIEW: ICD-10-PCS | Mod: ,,, | Performed by: PATHOLOGY

## 2023-03-31 PROCEDURE — 3008F PR BODY MASS INDEX (BMI) DOCUMENTED: ICD-10-PCS | Mod: CPTII,S$GLB,, | Performed by: INTERNAL MEDICINE

## 2023-03-31 PROCEDURE — 83615 LACTATE (LD) (LDH) ENZYME: CPT | Performed by: INTERNAL MEDICINE

## 2023-03-31 RX ORDER — METHYLPREDNISOLONE SOD SUCC 125 MG
125 VIAL (EA) INJECTION ONCE AS NEEDED
Status: CANCELLED | OUTPATIENT
Start: 2023-04-10

## 2023-03-31 RX ORDER — EPINEPHRINE 0.3 MG/.3ML
0.3 INJECTION SUBCUTANEOUS ONCE AS NEEDED
Status: CANCELLED | OUTPATIENT
Start: 2023-04-10

## 2023-03-31 RX ORDER — DIPHENHYDRAMINE HYDROCHLORIDE 50 MG/ML
50 INJECTION INTRAMUSCULAR; INTRAVENOUS ONCE AS NEEDED
Status: CANCELLED | OUTPATIENT
Start: 2023-04-10

## 2023-03-31 RX ORDER — SODIUM CHLORIDE 0.9 % (FLUSH) 0.9 %
10 SYRINGE (ML) INJECTION
Status: CANCELLED | OUTPATIENT
Start: 2023-04-10

## 2023-03-31 RX ORDER — HEPARIN 100 UNIT/ML
5 SYRINGE INTRAVENOUS
Status: CANCELLED | OUTPATIENT
Start: 2023-04-10

## 2023-03-31 RX ORDER — SODIUM CHLORIDE 9 MG/ML
INJECTION, SOLUTION INTRAVENOUS CONTINUOUS
Status: CANCELLED | OUTPATIENT
Start: 2023-04-10

## 2023-03-31 NOTE — PROGRESS NOTES
Section of Hematology and Stem Cell Transplantation  New Patient Consult     Referred by:  Dr. Gordy Cordero  Date of visit: 3/31/23    Reason for visit: Normocytic anemia [D64.9]    History of Present Ilness:   Vicky Alvarado (Vicky) is a pleasant 57 y.o.female with a past medical history as listed below who presents for initial consult for further workup of anemia. She was noted to have anemia in 2020. She previously donated blood and platelets regularly. In 7/2020 she had a femoral artery laceration following RFA resulting in significant blood loss. She had this repaired by vascular surgery. Then in 2/2022 she had menorrhagia from a uterine polyp. This was resolved with D&C and later a hysterectomy. Since then she has had significant fatigue and decreased exercise tolerance due to dyspnea.  Denies recent fevers, chills, night sweats, weight loss, lymphadenopathy, bone pain, new medications changes.     PAST MEDICAL HISTORY:   Past Medical History:   Diagnosis Date    Anticoagulant long-term use     Arthritis     Atrial fibrillation     cardioversion    Atrial fibrillation with RVR     Avascular necrosis     L hand    CHF (congestive heart failure)     Depression     Encounter for blood transfusion 07/22/2020    Encounter for blood transfusion 03/2022    GERD (gastroesophageal reflux disease)     Hx of psychiatric care     Hypertension     Iron deficiency anemia 5/7/2022    TIBC 444 with saturated iron 8    Obese     Pre-diabetes 5/7/2022    Psychiatric problem     Sleep apnea     wears cpap       PAST SURGICAL HISTORY:   Past Surgical History:   Procedure Laterality Date    ABLATION OF ARRHYTHMOGENIC FOCUS FOR ATRIAL FIBRILLATION N/A 7/20/2020    Procedure: Ablation atrial fibrillation;  Surgeon: Gabriel Hawley MD;  Location: [facility] EP LAB;  Service: Cardiology;  Laterality: N/A;  afib, PVI, RFA, REENA, SHADY, anes, MB, 3 Prep    ABLATION OF ARRHYTHMOGENIC FOCUS FOR ATRIAL FIBRILLATION N/A 1/24/2022     Procedure: Ablation atrial fibrillation;  Surgeon: Gabriel Hawley MD;  Location: [facility] EP LAB;  Service: Cardiology;  Laterality: N/A;  afib/afl, PVI (re-do)/CTI, RFA, REENA (cx if SR), SHADY, anes, MB, 3 Prep    APPLICATION OF WOUND VACUUM-ASSISTED CLOSURE DEVICE Left 8/3/2020    Procedure: APPLICATION, WOUND VAC;  Surgeon: SEMAJ Márquez III, MD;  Location: [facility] OR 2ND FLR;  Service: Peripheral Vascular;  Laterality: Left;    APPLICATION OF WOUND VACUUM-ASSISTED CLOSURE DEVICE Left 8/6/2020    Procedure: APPLICATION, WOUND VAC;  Surgeon: SEMAJ Márquez III, MD;  Location: [facility] OR 2ND FLR;  Service: Peripheral Vascular;  Laterality: Left;    CARPAL TUNNEL RELEASE Right 6/10/2020    Procedure: RELEASE, CARPAL TUNNEL - RIGHT;  Surgeon: Adelaida Hall MD;  Location: [facility] OR;  Service: Orthopedics;  Laterality: Right;  GENERAL AND REGIONAL    CARPAL TUNNEL RELEASE Left 5/5/2021    Procedure: RELEASE, CARPAL TUNNEL, LEFT;  Surgeon: Adelaida Hall MD;  Location: [facility] OR;  Service: Orthopedics;  Laterality: Left;  GENERAL & REGIONAL IN PACU    CLOSURE OF WOUND Left 8/6/2020    Procedure: CLOSURE, WOUND;  Surgeon: SEMAJ Márquez III, MD;  Location: [facility] OR 2ND FLR;  Service: Peripheral Vascular;  Laterality: Left;  Complex    COLONOSCOPY N/A 8/17/2021    Procedure: COLONOSCOPY Suprep;  Surgeon: Loco Deluca MD;  Location: [facility];  Service: Endoscopy;  Laterality: N/A;    ESOPHAGOGASTRODUODENOSCOPY N/A 8/17/2021    Procedure: EGD (ESOPHAGOGASTRODUODENOSCOPY);  Surgeon: Loco Deluca MD;  Location: [facility];  Service: Endoscopy;  Laterality: N/A;  Patient is schedule to have her Covid test on 08/14/2021 at the [facility] @ 9:30am. [place].    EXPLORATION OF FEMORAL ARTERY Left 7/21/2020    Procedure: EXPLORATION, ARTERY, FEMORAL;  Surgeon: SEMAJ Márquez III, MD;  Location: [facility] OR 2ND FLR;  Service: Peripheral Vascular;  Laterality: Left;  1. Urgent Direct repair, L SFA branch  laceration    FOOT SURGERY      HYSTEROSCOPY WITH DILATION AND CURETTAGE OF UTERUS N/A 2/19/2022    Procedure: HYSTEROSCOPY, WITH DILATION AND CURETTAGE OF UTERUS;  Surgeon: Shane Palomo MD;  Location: [address];  Service: OB/GYN;  Laterality: N/A;    INCISION AND DRAINAGE OF KNEE Left 5/12/2022    Procedure: INCISION AND DRAINAGE, Prepatellar bursectomy.;  Surgeon: Dre Guzmán MD;  [address];  Service: Orthopedics;  Laterality: Left;    LEFT HEART CATHETERIZATION Left 2/7/2023    Procedure: Left heart cath;  Surgeon: Josiah Terry MD;  Location: [facility] CATH LAB;  Service: Cardiology;  Laterality: Left;  borderline moderate bleeding risk 6.3%    ROBOT-ASSISTED LAPAROSCOPIC ABDOMINAL HYSTERECTOMY USING DA KEIRY XI N/A 8/9/2022    Procedure: XI ROBOTIC HYSTERECTOMY;  Surgeon: Yolanda Barriga MD;  Location: [facility];  Service: OB/GYN;  Laterality: N/A;  dual console requested    ROBOT-ASSISTED LAPAROSCOPIC SALPINGO-OOPHORECTOMY Bilateral 8/9/2022    Procedure: ROBOTIC SALPINGO-OOPHORECTOMY;  Surgeon: Yolanda Barriga MD;  Location: [facility];  Service: OB/GYN;  Laterality: Bilateral;    TREATMENT OF CARDIAC ARRHYTHMIA N/A 1/29/2020    Procedure: CARDIOVERSION;  Surgeon: Gabriel Hawley MD;  Location: [facility] EP LAB;  Service: Cardiology;  Laterality: N/A;  af, demi, dccv, anes, mb, 345    TREATMENT OF CARDIAC ARRHYTHMIA  1/24/2022    Procedure: Cardioversion or Defibrillation;  Surgeon: Gabriel Hawley MD;  Location: [facility] EP LAB;  Service: Cardiology;;    WOUND DEBRIDEMENT Left 8/6/2020    Procedure: DEBRIDEMENT, WOUND;  Surgeon: SEMAJ Márquez III, MD;  Location: [address];  Service: Peripheral Vascular;  Laterality: Left;       PAST SOCIAL HISTORY:  Social History     Tobacco Use    Smoking status: Former     Types: Cigarettes     Quit date: 7/1/2019     Years since quitting: 3.7    Smokeless tobacco: Never   Substance Use Topics    Alcohol use: No    Drug use: No        FAMILY HISTORY:  Family History   Problem Relation Age of Onset    Cataracts Mother     Hypertension Mother     Thyroid disease Mother     Diabetes Father     Hypertension Father     Hypertension Sister     Hypertension Brother     Amblyopia Neg Hx     Blindness Neg Hx     Cancer Neg Hx     Glaucoma Neg Hx     Macular degeneration Neg Hx     Retinal detachment Neg Hx     Strabismus Neg Hx     Stroke Neg Hx     Breast cancer Neg Hx     Colon cancer Neg Hx     Ovarian cancer Neg Hx        CURRENT MEDICATIONS:   Current Outpatient Medications   Medication Sig    albuterol (VENTOLIN HFA) 90 mcg/actuation inhaler Inhale 2 puffs into the lungs every 6 (six) hours as needed for Wheezing. Rescue    ALPRAZolam (XANAX) 0.5 MG tablet Take 1 tablet (0.5 mg total) by mouth 2 (two) times daily as needed for Anxiety.    apixaban (ELIQUIS) 5 mg Tab Take 1 tablet (5 mg total) by mouth 2 (two) times daily.    atorvastatin (LIPITOR) 80 MG tablet Take 1 tablet (80 mg total) by mouth once daily.    b complex vitamins capsule Take 1 capsule by mouth once daily.    colchicine (COLCRYS) 0.6 mg tablet For gout attack: Take TWO tablets by mouth, then, 2 hours later, take ONE additional tablet. Then take 1 tablet twice daily only if still needed for gout pain.    DULoxetine (CYMBALTA) 60 MG capsule Take 2 capsules (120 mg total) by mouth once daily.    ferrous sulfate (SLOW RELEASE IRON) 142 mg (45 mg iron) TbSR Take 1 tablet (142 mg total) by mouth once daily. FOR 7 DAY THEN INCREASE TO TWICE DAILY AS TOLERATED    furosemide (LASIX) 40 MG tablet Take 1 tablet (40 mg total) by mouth 2 (two) times daily. Resume as needed for edema until followup with your PCP.    gluc-shikha-[facility]#[id number] 750 mg-644 mg- 30 mg-1 mg Tab Take 1 tablet by mouth once daily.     guaiFENesin-codeine 100-10 mg/5 ml (TUSSI-ORGANIDIN NR)  mg/5 mL syrup Take 5 mLs by mouth 3 (three) times daily as needed for Cough.    ibuprofen (ADVIL,MOTRIN) 600 MG  tablet Take 1 tablet (600 mg total) by mouth every 6 (six) hours as needed for Pain.    krill/om-3/dha/epa/phospho/ast (MEGARED OMEGA-3 KRILL OIL ORAL) Take 1 capsule by mouth once daily.    lisinopriL (PRINIVIL,ZESTRIL) 40 MG tablet Take 1 tablet (40 mg total) by mouth once daily.    melatonin 10 mg Tab Take 1-2 tablets by mouth nightly as needed (Sleep).    metoprolol succinate (TOPROL-XL) 50 MG 24 hr tablet Take 4 tablets (200 mg total) by mouth once daily.    multivitamin (THERAGRAN) per tablet Take 1 tablet by mouth once daily.    NIFEdipine (PROCARDIA-XL) 90 MG (OSM) 24 hr tablet Take 1 tablet (90 mg total) by mouth once daily. HOLD UNTIL FOLLOW-UP WITH YOUR PCP.    omeprazole (PRILOSEC) 20 MG capsule TAKE 1 CAPSULE BY MOUTH ONCE DAILY    oxyCODONE-acetaminophen (PERCOCET) 7.5-325 mg per tablet Take 1 tablet by mouth every 8 (eight) hours as needed for Pain.    potassium chloride SA (K-DUR,KLOR-CON) 20 MEQ tablet Take 1 tablet (20 mEq total) by mouth once daily. ONLY TAKE WITH LASIX.    predniSONE (DELTASONE) 10 MG tablet Take 1 tablet by mouth daily    propafenone (RYTHMOL) 225 MG Tab Take 1 tablet (225 mg total) by mouth every 8 (eight) hours.    SENNA 8.6 mg tablet Take 1 tablet by mouth 2 (two) times daily. (Patient taking differently: Take 1 tablet by mouth 2 (two) times daily. prn)    traZODone (DESYREL) 100 MG tablet Take 1 tablet (100 mg total) by mouth nightly as needed for Insomnia.    walker (ULTRA-LIGHT ROLLATOR) Misc As directed     No current facility-administered medications for this visit.     Facility-Administered Medications Ordered in Other Visits   Medication    sodium chloride 0.9% bolus 1,000 mL       ALLERGIES:   Review of patient's allergies indicates:   Allergen Reactions    Flecainide Shortness Of Breath and Swelling    Vancomycin analogues Hives, Itching and Rash       Review of Systems:     Pertinent positives and negatives included in the HPI. Otherwise a complete review of systems  is negative.    Physical Exam:     Vitals:    03/31/23 1442   BP: 120/71   Pulse: 72   Resp: 18   Temp: 98 °F (36.7 °C)       General: Appears well, NAD  HEENT: MMM, no OP lesions  Neck: Supple, no LAD  Pulmonary: CTAB, no increased work of breathing, no W/R/C  Cardiovascular: S1S2 normal, RRR, no M/R/G  Abdominal: Soft, NT, ND, BS+, no HSM  Extremities: No C/C/E  Neurological: AAOx4, grossly normal, no focal deficits  Dermatologic: No appreciable rashes or lesions    Labs:   Lab Results   Component Value Date    WBC 7.91 02/24/2023    HGB 8.3 (L) 02/24/2023    HCT 30.4 (L) 02/24/2023    MCV 82 02/24/2023     02/24/2023       Lab Results   Component Value Date     02/24/2023    K 4.0 02/24/2023    CL 99 02/24/2023    CO2 26 02/24/2023    BUN 20 02/24/2023    CREATININE 0.9 02/24/2023    ALBUMIN 3.7 08/10/2022    BILITOT 0.7 08/10/2022    ALKPHOS 77 08/10/2022    AST 63 (H) 08/10/2022    ALT 13 08/10/2022       Lab Results   Component Value Date    IRON 34 10/18/2022    TIBC 551 (H) 10/18/2022    FERRITIN 19 (L) 10/18/2022       No components found for: RETICULOCYTES    Lab Results   Component Value Date    HAPTOGLOBIN 109 02/23/2022        Assessment and Plan:   Vicky Wiggins) is a pleasant 57 y.o.female who presents for initial consult for further workup of anemia.    Normocytic anemia: Given her multiple recent episodes of significant blood loss and history of frequent blood donations, she likely has iron deficiency anemia. Her iron studies in 10/2022 were consistent with iron deficiency anemia. She may also have a component of anemia of chronic disease. Will send workup for additional causes of anemia as listed below. Will arrange for Injectafer 1500mg.  Injectafer 1500mg.  Anemia workup as below.    Follow up: 3 months.    Orders/Follow Up:      Orders Placed This Encounter    CBC Auto Differential    Comprehensive Metabolic Panel    Copper, Serum    Ferritin    Folate     Haptoglobin    Iron and TIBC    Lactate Dehydrogenase    Pathologist Interpretation Differential    Reticulocytes    Vitamin B12     Route Chart for Scheduling    BMT Chart Routing      Follow up with physician 3 months. RTC 3-4 months in benign heme clinic   Follow up with NATALI    Provider visit type Benign hem   Infusion scheduling note    Injection scheduling note    Labs CBC, CMP, phosphorus, magnesium, ferritin, iron and TIBC and reticulocytes   Scheduling:  Preferred lab:  Lab interval:  Prior to visit   Imaging    Pharmacy appointment    Other referrals              Therapy Plan Information  Medications  ferric carboxymaltose (INJECTAFER) 750 mg in sodium chloride 0.9% 265 mL infusion  750 mg, Intravenous, 1 time a week  IV Fluids  0.9%  NaCl infusion  Intravenous, 1 time a week  Flushes  sodium chloride 0.9% flush 10 mL  10 mL, Intravenous, 1 time a week  heparin, porcine (PF) 100 unit/mL injection flush 500 Units  500 Units, Intravenous, 1 time a week  sodium chloride 0.9% 100 mL flush bag  Intravenous, 1 time a week  PRN Medications  EPINEPHrine (EPIPEN) 0.3 mg/0.3 mL pen injection 0.3 mg  0.3 mg, Intramuscular, PRN  diphenhydrAMINE injection 50 mg  50 mg, Intravenous, PRN  methylPREDNISolone sodium succinate injection 125 mg  125 mg, Intravenous, PRN  sodium chloride 0.9% bolus 1,000 mL 1,000 mL  1,000 mL, Intravenous, PRN      Total time of this visit was 35 minutes, including time spent face to face with patient and/or via video/audio, and also in preparing for today's visit for MDM and documentation. (Medical Decision Making, including consideration of possible diagnoses, management options, complex medical record review, review of diagnostic tests and information, consideration and discussion of significant complications based on comorbidities, and discussion with providers involved with the care of the patient). Greater than 50% was spent face to face with the patient counseling and coordinating  care.    Thank you for allowing me to partake in the care of this patient. If there are any questions, please do not hesitate to reach out.    Socrates Wilhelm MD  Hematology, Oncology, and Stem Cell Transplantation  [facility]

## 2023-04-03 LAB — PATH REV BLD -IMP: NORMAL

## 2023-04-04 LAB — COPPER SERPL-MCNC: 1519 UG/L (ref 810–1990)

## 2023-04-10 NOTE — CONSULTS
[facility]  Vascular Surgery  Consult Note    Inpatient consult to Vascular Surgery  Consult performed by: Rogelio Gillette MD  Consult ordered by: Libia Canales MD        Subjective:     Chief Complaint/Reason for Admission: Left groin hematoma following cardiac ablation     History of Present Illness: 56 y/o F with a pmh of  MVP, HTN, depression, hx opioid dependenc and recent diagnosis of atrial fibrillation. She underwent an ablation today with Cardiology. Post procedure she was noted to have a hematoma in her left groin. At this time pressure was held and an ultrasound was obtained. Despite holding pressure her hematoma continued to expand. U/s did not show active flow into the hematoma or evidence of a pseudoaneurysm. Of note patient is on Eliquis at home and per Cardiology instructions took a dose the night prior to the procedure and held the AM of the procedure.    Medications Prior to Admission   Medication Sig Dispense Refill Last Dose    apixaban (ELIQUIS) 5 mg Tab Take 1 tablet (5 mg total) by mouth 2 (two) times daily. 60 tablet 6 7/19/2020 at 2100    b complex vitamins capsule Take 1 capsule by mouth once daily.   7/19/2020 at 0800    DULoxetine (CYMBALTA) 60 MG capsule Take 2 capsules (120 mg total) by mouth once daily. 180 capsule 3 7/20/2020 at 0600    gluc[place] [place]-pxqz-reymundo-bor 750 mg-644 mg- 30 mg-1 mg Tab Take 1 tablet by mouth once daily.    7/19/2020 at 0600    hydroCHLOROthiazide (HYDRODIURIL) 50 MG tablet Take 1 tablet (50 mg total) by mouth once daily. 90 tablet 5 7/19/2020 at 0600    krill/om-3/dha/epa/phospho/ast (MEGARED OMEGA-3 KRILL OIL ORAL)    7/19/2020 at 0600    lisinopriL (PRINIVIL,ZESTRIL) 40 MG tablet Take 1 tablet (40 mg total) by mouth once daily. 90 tablet 5 7/19/2020 at 0600    MELATONIN ORAL Take 1-2 tablets by mouth nightly as needed (Sleep).   7/18/2020 at 2100    metoprolol succinate (TOPROL-XL) 50 MG 24 hr tablet Take 1 tablet  (50 mg total) by mouth 2 (two) times daily. 90 tablet 6 2020 at 0600    multivitamin (THERAGRAN) per tablet Take 1 tablet by mouth once daily.   2020 at 0600    omeprazole (PRILOSEC) 20 MG capsule TAKE 1 CAPSULE BY MOUTH ONCE DAILY 90 capsule 3 2020 at 0600    traZODone (DESYREL) 100 MG tablet Take 1 tablet (100 mg total) by mouth nightly as needed for Insomnia. 90 tablet 3 2020 at 2100       Review of patient's allergies indicates:  No Known Allergies    Past Medical History:   Diagnosis Date    Atrial fibrillation     cardioversion    Avascular necrosis     L hand    Depression     GERD (gastroesophageal reflux disease)     Hx of psychiatric care     Hypertension     Psychiatric problem      Past Surgical History:   Procedure Laterality Date    CARPAL TUNNEL RELEASE Right 6/10/2020    Procedure: RELEASE, CARPAL TUNNEL - RIGHT;  Surgeon: Adelaida Hall MD;  Location: [facility] OR;  Service: Orthopedics;  Laterality: Right;  GENERAL AND REGIONAL    FOOT SURGERY      TREATMENT OF CARDIAC ARRHYTHMIA N/A 2020    Procedure: CARDIOVERSION;  Surgeon: Gabriel Hawley MD;  Location: [facility] EP LAB;  Service: Cardiology;  Laterality: N/A;  af, demi, dccv, anes, mb, 345     Family History     Problem Relation (Age of Onset)    Cataracts Mother    Diabetes Father    Hypertension Mother, Father, Sister, Brother    Thyroid disease Mother        Tobacco Use    Smoking status: Former Smoker     Types: Cigarettes     Quit date: 2019     Years since quittin.0    Smokeless tobacco: Never Used   Substance and Sexual Activity    Alcohol use: No    Drug use: No    Sexual activity: Not on file     Review of Systems   Constitutional: Positive for diaphoresis. Negative for activity change and appetite change.   Respiratory: Negative for cough and shortness of breath.    Cardiovascular: Positive for leg swelling (hematoma). Negative for chest pain.   Gastrointestinal: Negative for  abdominal distention and abdominal pain.   Skin: Negative for color change.   Neurological: Negative for numbness and headaches.   Psychiatric/Behavioral: Negative for agitation. The patient is nervous/anxious.      Objective:     Vital Signs (Most Recent):  Temp: 98.4 °F (36.9 °C) (07/20/20 2327)  Pulse: 73 (07/20/20 2327)  Resp: 18 (07/21/20 0014)  BP: 129/73 (07/21/20 0022)  SpO2: (!) 92 % (07/20/20 2327) Vital Signs (24h Range):  Temp:  [97.5 °F (36.4 °C)-98.8 °F (37.1 °C)] 98.4 °F (36.9 °C)  Pulse:  [65-91] 73  Resp:  [9-24] 18  SpO2:  [92 %-97 %] 92 %  BP: (102-129)/(55-73) 129/73     Weight: 131.5 kg (290 lb)  Body mass index is 46.81 kg/m².    Physical Exam  Vitals signs and nursing note reviewed.   Constitutional:       Comments: Morbidly obese   HENT:      Head: Normocephalic and atraumatic.   Eyes:      Extraocular Movements: Extraocular movements intact.   Cardiovascular:      Rate and Rhythm: Normal rate and regular rhythm.   Abdominal:      General: There is no distension.      Palpations: Abdomen is soft.      Tenderness: There is no abdominal tenderness.   Musculoskeletal: Normal range of motion.      Comments: Large (25cm hematoma) over her left groin  Tender to palpation  R: 2+DP/PT  L: 2+ DP/ Triphasic PT   Sensation intact BLE  Motor intact BLE   Neurological:      General: No focal deficit present.      Mental Status: She is alert.         Significant Labs:  Reviewed    Significant Diagnostics:  Reviewed    Assessment/Plan:     Hematoma of groin  56 y/o F with an expanding left groin hematoma following cardiac ablation.     To OR for emergent exploration of left femoral artery/vein  Consent obtained  2 units PRBC prepared        Rogelio Gillette MD  Vascular Surgery  [facility]   Abdominal pain

## 2023-04-10 NOTE — PROGRESS NOTES
Subjective:   Patient ID:  Vicky Alvarado is a 57 y.o. female who presents for follow-up of Atrial Fibrillation      57 yoF here for AF management.     7/7/2020: 1/9/20, she had her upper teeth pulled for dentures and been on penicillin since that time. She was in SR. She takes atenolol 10 mg q.d. for hypertension. She underwent REENA/CV 1/29/2020. EF 40% in AF. PET Stress 3/2020 revealed no ischemia. Repeat echo in NSR showed EF 60%. She has had recurrence, once during carpal tunnel surgery, and several other times since.  Regarding her family history, her mother and brother both have atrial fibrillation. She is on elquis for CVA prophylaxis. CHADSVASC of at least 3. No bleeding issues with xarelto.      10/2020: PVI 7/20/2020. 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 She underwent emergent exploratory surgery by Dr Rose who ligated a branch of her left SFA. She had further wound issues requiring wash out and wound vac over the next few months. This has been a challenging recovery but she is nearing completion. No symptomatic or documented AF recurrence.     3/21: AF/RVR recurrence with ER visit. Flecainide 100 mg bid started 3/1/21. No recurrence since. Some PVCs    6/21: Her leg incision has healed. She has resumed work. Over the past month, she has developed more NAPIER.     9/21/21: HTN meds adjusted. Lasix started, hctz stopped, nifedipine started. Labs showed anemia. Endoscopy with no lesions. Respiratory evaluation performed without obvious culprit. LE edema worsened. She stopped flecainide with resolution of symptoms.  She has had some palpitations.     12/21: rythmol started for pAF. She feels that her AF has returned recently with some long sustained events. In SR today. She feels that the AF is compromising her QOL and asks about repeat ablation.     4/22: Repeat ablation 1/22 with rare and brief, longest 1h. She has since developed  bleeding and is due for  hysterectomy later this spring. Symptoms much improved.     Interval history: She had NAPIER leading to PET stress and then LHC. LHC with only distal, small vessel disease. EF normal 10/22. She has FE deficient anemia.     CHADSVASC of 4  HASBLED of 3    Echo 10/22:  · The left ventricle is mildly enlarged with normal systolic function. The estimated ejection fraction is 60%.  · Normal right ventricular size with normal right ventricular systolic function.  · Grade II left ventricular diastolic dysfunction.  · Moderate left atrial enlargement.  · There is mild aortic valve stenosis.  · Aortic valve area is 1.66 cm2; peak velocity is 2.94 m/s; mean gradient is 14 mmHg.  · The estimated PA systolic pressure is 35 mmHg.  · Normal central venous pressure (3 mmHg).     LHC 2/23:  1. The left main was normal  2. The LAD had luminal irregularity without obstructive disease  3. The proximal circumflex and large OMB were normal.  The distal circumflex supplying the distal OMB had 70-80% stenosis.  This was a small to medium-sized vessel.  4. The RCA had luminal irregularity distally with a 50% stenosis before the PDA.  No intervention    Ablation 1/22:  · Empiric CTI ablation.  · Pulmonary vein re-isolation of Right PVs, Left PVs isolated at baseline  · Posterior wall isolation.  · Induction of clockwise mitral annular flutter  · Successful lateral mitral isthmus line with demonstration of bidirectional block  · Catheter ablation of two mechanistically distinct tachycardias.  · 3D mapping performed with Ensite.  · Intracardiac echo  · Complex case due to multiple tachycardias and lines of block achieved    holter 7/21 10% PVCs    Echo 6/30/21:  · The left ventricle is normal in size with concentric remodeling and normal systolic function. The estimated ejection fraction is 60%.  · Normal left ventricular diastolic function.  · Normal right ventricular size with normal right ventricular systolic function.  · Normal central  venous pressure (3 mmHg).  · Aortic valve area is 1.63 cm2; peak velocity is 2.63 m/s; mean gradient is 17 mmHg.  · There is mild aortic valve stenosis.  · The estimated ejection fraction is 60%.    TTE 5/2020:  · Normal left ventricular systolic function. The estimated ejection fraction is 60%.  · Severe left atrial enlargement.  · Grade I (mild) left ventricular diastolic dysfunction consistent with impaired relaxation.  · Normal right ventricular systolic function.  · Mild right atrial enlargement.  · Mild aortic valve stenosis. Aortic valve area is 1.83 cm2; peak velocity is 2.42 m/s; mean gradient is 12 mmHg.  · Mild tricuspid regurgitation.  · Normal central venous pressure (3 mmHg).  · The estimated PA systolic pressure is 22 mmHg.     PET Stress 3/2020:  The relative PET images are normal showing no clinically significant regional resting or stress induced perfusion defects.  ·  Whole heart absolute myocardial perfusion (cc/min/g) averaged 0.47 cc/min/g at rest (which is reduced), 0.93 cc/min/g at stress (which is moderately reduced), and 2.03 CFR (which is mildly reduced).  ·  Gated perfusion images showed an ejection fraction of 78% at rest and 72% during stress. Normal ejection fraction is greater than 51%.  ·  Wall motion was normal at rest and during stress.  ·  LV cavity size is normal at rest and stress.  ·  The EKG portion of this study is negative for ischemia.  ·  There were no arrhythmias during stress.  ·  The patient reported no chest pain during the stress test.  ·  There are no prior studies for comparison.     TTE (January 2020):   · Atrial fibrillation observed.  · Left ventricular systolic function. The estimated ejection fraction is 40%.  · Local segmental wall motion abnormalities.  · Moderate left atrial enlargement.  · Normal right ventricular systolic function.  · Moderate right atrial enlargement.  · Possibly mild aortic valve stenosis ( reduced LENCHO but valve opening appears near  normal).  · Aortic valve area is 1.67 cm2; peak velocity is 1.77 m/s; mean gradient is 8 mmHg.  · Mild mitral regurgitation.  · Mild tricuspid regurgitation.  · Normal central venous pressure (3 mmHg).  · The estimated PA systolic pressure is 29 mmHg.    Past Medical History:  No date: Anticoagulant long-term use  No date: Arthritis  No date: Atrial fibrillation      Comment:  cardioversion  No date: Atrial fibrillation with RVR  No date: Avascular necrosis      Comment:  L hand  No date: CHF (congestive heart failure)  No date: Depression  07/22/2020: Encounter for blood transfusion  03/2022: Encounter for blood transfusion  No date: GERD (gastroesophageal reflux disease)  No date: Hx of psychiatric care  No date: Hypertension  5/7/2022: Iron deficiency anemia      Comment:  TIBC 444 with saturated iron 8  No date: Obese  5/7/2022: Pre-diabetes  No date: Psychiatric problem  No date: Sleep apnea      Comment:  wears cpap    Past Surgical History:  7/20/2020: ABLATION OF ARRHYTHMOGENIC FOCUS FOR ATRIAL FIBRILLATION;   N/A      Comment:  Procedure: Ablation atrial fibrillation;  Surgeon:                Gabriel Hawley MD;  Location: [facility] EP LAB;  Service:               Cardiology;  Laterality: N/A;  afib, PVI, RFA, REENA, SHADY,                anes, MB, 3 Prep  1/24/2022: ABLATION OF ARRHYTHMOGENIC FOCUS FOR ATRIAL FIBRILLATION;   N/A      Comment:  Procedure: Ablation atrial fibrillation;  Surgeon:                Gabriel Hawley MD;  Location: [facility] EP LAB;  Service:               Cardiology;  Laterality: N/A;  afib/afl, PVI (re-do)/CTI,               RFA, REENA (cx if SR), SHADY, anes, MB, 3 Prep  8/3/2020: APPLICATION OF WOUND VACUUM-ASSISTED CLOSURE DEVICE; Left      Comment:  Procedure: APPLICATION, WOUND VAC;  Surgeon: SEMAJ Márquez III, MD;  Location: [facility] OR [address] [place] [address];  Service:               Peripheral Vascular;  Laterality: Left;  8/6/2020: APPLICATION OF WOUND VACUUM-ASSISTED CLOSURE DEVICE;  Left      Comment:  Procedure: APPLICATION, WOUND VAC;  Surgeon: SEMAJ Márquez III, MD;  Location: [facility] OR 2ND FLR;  Service:               Peripheral Vascular;  Laterality: Left;  6/10/2020: CARPAL TUNNEL RELEASE; Right      Comment:  Procedure: RELEASE, CARPAL TUNNEL - RIGHT;  Surgeon:                Adelaida Hall MD;  Location: [facility] OR;  Service:                Orthopedics;  Laterality: Right;  GENERAL AND REGIONAL  5/5/2021: CARPAL TUNNEL RELEASE; Left      Comment:  Procedure: RELEASE, CARPAL TUNNEL, LEFT;  Surgeon:                Adelaida Hall MD;  Location: [facility] OR;  Service:                Orthopedics;  Laterality: Left;  GENERAL & REGIONAL IN                PACU  8/6/2020: CLOSURE OF WOUND; Left      Comment:  Procedure: CLOSURE, WOUND;  Surgeon: SEMAJ Márquez III, MD;  Location: [facility] OR 2ND FLR;  Service: Peripheral               Vascular;  Laterality: Left;  Complex  8/17/2021: COLONOSCOPY; N/A      Comment:  Procedure: COLONOSCOPY Suprep;  Surgeon: Loco Deluca MD;  Location: [facility];  Service: Endoscopy;                Laterality: N/A;  8/17/2021: ESOPHAGOGASTRODUODENOSCOPY; N/A      Comment:  Procedure: EGD (ESOPHAGOGASTRODUODENOSCOPY);  Surgeon:                Loco Deluca MD;  Location: [facility];                 Service: Endoscopy;  Laterality: N/A;  Patient is                schedule to have her Covid test on 08/14/2021 at the                [facility] @ 9:30am. [place].  7/21/2020: EXPLORATION OF FEMORAL ARTERY; Left      Comment:  Procedure: EXPLORATION, ARTERY, FEMORAL;  Surgeon: SEMAJ Márquez III, MD;  Location: [facility] OR 2ND FLR;  Service:               Peripheral Vascular;  Laterality: Left;  1. Urgent Direct               repair, L SFA branch laceration  No date: FOOT SURGERY  2/19/2022: HYSTEROSCOPY WITH DILATION AND CURETTAGE OF UTERUS; N/A      Comment:  Procedure: HYSTEROSCOPY, WITH  DILATION AND CURETTAGE OF                UTERUS;  Surgeon: Shane Palomo MD;  Location: [facility] OR                2ND FLR;  Service: OB/GYN;  Laterality: N/A;  5/12/2022: INCISION AND DRAINAGE OF KNEE; Left      Comment:  Procedure: INCISION AND DRAINAGE, Prepatellar                bursectomy.;  Surgeon: Dre Guzmán MD;  Location:                [facility] OR 2ND FLR;  Service: Orthopedics;  Laterality:                Left;  2/7/2023: LEFT HEART CATHETERIZATION; Left      Comment:  Procedure: Left heart cath;  Surgeon: Josiah Terry MD;  Location: [facility] CATH LAB;  Service: Cardiology;                 Laterality: Left;  borderline moderate bleeding risk 6.3%  8/9/2022: ROBOT-ASSISTED LAPAROSCOPIC ABDOMINAL HYSTERECTOMY USING DA   KEIRY XI; N/A      Comment:  Procedure: XI ROBOTIC HYSTERECTOMY;  Surgeon: Yolanda Barriga MD;  Location: [facility];  Service: OB/GYN;                 Laterality: N/A;  dual console requested  8/9/2022: ROBOT-ASSISTED LAPAROSCOPIC SALPINGO-OOPHORECTOMY; Bilateral      Comment:  Procedure: ROBOTIC SALPINGO-OOPHORECTOMY;  Surgeon:                Yolanda Barriga MD;  Location: [facility];  Service:                OB/GYN;  Laterality: Bilateral;  1/29/2020: TREATMENT OF CARDIAC ARRHYTHMIA; N/A      Comment:  Procedure: CARDIOVERSION;  Surgeon: Gabriel Hawley MD;  Location: [facility] EP LAB;  Service: Cardiology;                 Laterality: N/A;  af, demi, dccv, anes, mb, 345  1/24/2022: TREATMENT OF CARDIAC ARRHYTHMIA      Comment:  Procedure: Cardioversion or Defibrillation;  Surgeon:                Gabriel Hawley MD;  Location: [facility] EP LAB;  Service:               Cardiology;;  8/6/2020: WOUND DEBRIDEMENT; Left      Comment:  Procedure: DEBRIDEMENT, WOUND;  Surgeon: SEMAJ Márquez III, MD;  Location: [facility] OR 2ND FLR;  Service: Peripheral               Vascular;  Laterality: Left;    Social History    Socioeconomic History       Marital status:     Tobacco Use      Smoking status: Former        Types: Cigarettes        Quit date: 7/1/2019        Years since quitting: 3.7      Smokeless tobacco: Never    Substance and Sexual Activity      Alcohol use: No      Drug use: No      Sexual activity: Not Currently        Partners: Male        Birth control/protection: See Surgical Hx    Social Determinants of Health  Financial Resource Strain: Low Risk       Difficulty of Paying Living Expenses: Not very hard  Food Insecurity: No Food Insecurity      Worried About Running Out of Food in the Last Year: Never true      Ran Out of Food in the Last Year: Never true  Transportation Needs: No Transportation Needs      Lack of Transportation (Medical): No      Lack of Transportation (Non-Medical): No  Physical Activity: Inactive      Days of Exercise per Week: 0 days      Minutes of Exercise per Session: 0 min  Stress: Stress Concern Present      Feeling of Stress : To some extent  Social Connections: Moderately Isolated      Frequency of Communication with Friends and Family: More than three times a week      Frequency of Social Gatherings with Friends and Family: More than three times a week      Attends Spiritism Services: 1 to 4 times per year      Active Member of Clubs or Organizations: No      Attends Club or Organization Meetings: Never      Marital Status:   Housing Stability: Unknown      Unable to Pay for Housing in the Last Year: No      Unstable Housing in the Last Year: No    Review of patient's family history indicates:      Current Outpatient Medications:  albuterol (VENTOLIN HFA) 90 mcg/actuation inhaler, Inhale 2 puffs into the lungs every 6 (six) hours as needed for Wheezing. Rescue, Disp: 18 g, Rfl: 0  ALPRAZolam (XANAX) 0.5 MG tablet, Take 1 tablet (0.5 mg total) by mouth 2 (two) times daily as needed for Anxiety., Disp: 60 tablet, Rfl: 4  apixaban (ELIQUIS) 5 mg Tab, Take 1 tablet (5 mg total) by mouth 2 (two) times  daily., Disp: 90 tablet, Rfl: 5  atorvastatin (LIPITOR) 80 MG tablet, Take 1 tablet (80 mg total) by mouth once daily., Disp: 90 tablet, Rfl: 3  b complex vitamins capsule, Take 1 capsule by mouth once daily., Disp: , Rfl:   colchicine (COLCRYS) 0.6 mg tablet, For gout attack: Take TWO tablets by mouth, then, 2 hours later, take ONE additional tablet. Then take 1 tablet twice daily only if still needed for gout pain., Disp: 20 tablet, Rfl: 4  DULoxetine (CYMBALTA) 60 MG capsule, Take 2 capsules (120 mg total) by mouth once daily., Disp: 180 capsule, Rfl: 3  ferrous sulfate (SLOW RELEASE IRON) 142 mg (45 mg iron) TbSR, Take 1 tablet (142 mg total) by mouth once daily. FOR 7 DAY THEN INCREASE TO TWICE DAILY AS TOLERATED, Disp: , Rfl:   furosemide (LASIX) 40 MG tablet, Take 1 tablet (40 mg total) by mouth 2 (two) times daily. Resume as needed for edema until followup with your PCP., Disp: 60 tablet, Rfl: 11  gluc-shikha-[facility]#[id number] 750 mg-644 mg- 30 mg-1 mg Tab, Take 1 tablet by mouth once daily. , Disp: , Rfl:   guaiFENesin-codeine 100-10 mg/5 ml (TUSSI-ORGANIDIN NR)  mg/5 mL syrup, Take 5 mLs by mouth 3 (three) times daily as needed for Cough., Disp: 236 mL, Rfl: 0  ibuprofen (ADVIL,MOTRIN) 600 MG tablet, Take 1 tablet (600 mg total) by mouth every 6 (six) hours as needed for Pain., Disp: 30 tablet, Rfl: 1  krill/om-3/dha/epa/phospho/ast (MEGARED OMEGA-3 KRILL OIL ORAL), Take 1 capsule by mouth once daily., Disp: , Rfl:   lisinopriL (PRINIVIL,ZESTRIL) 40 MG tablet, Take 1 tablet (40 mg total) by mouth once daily., Disp: 90 tablet, Rfl: 3  melatonin 10 mg Tab, Take 1-2 tablets by mouth nightly as needed (Sleep)., Disp: , Rfl:   metoprolol succinate (TOPROL-XL) 50 MG 24 hr tablet, Take 4 tablets (200 mg total) by mouth once daily., Disp: 180 tablet, Rfl: 3  multivitamin (THERAGRAN) per tablet, Take 1 tablet by mouth once daily., Disp: , Rfl:   NIFEdipine (PROCARDIA-XL) 90 MG (OSM) 24 hr tablet, Take 1  tablet (90 mg total) by mouth once daily. HOLD UNTIL FOLLOW-UP WITH YOUR PCP., Disp: 30 tablet, Rfl: 11  omeprazole (PRILOSEC) 20 MG capsule, TAKE 1 CAPSULE BY MOUTH ONCE DAILY, Disp: 90 capsule, Rfl: 3  oxyCODONE-acetaminophen (PERCOCET) 7.5-325 mg per tablet, Take 1 tablet by mouth every 8 (eight) hours as needed for Pain., Disp: 20 tablet, Rfl: 0  potassium chloride SA (K-DUR,KLOR-CON) 20 MEQ tablet, Take 1 tablet (20 mEq total) by mouth once daily. ONLY TAKE WITH LASIX., Disp: 30 tablet, Rfl: 11  predniSONE (DELTASONE) 10 MG tablet, Take 1 tablet by mouth daily, Disp: 10 tablet, Rfl: 0  propafenone (RYTHMOL) 225 MG Tab, Take 1 tablet (225 mg total) by mouth every 8 (eight) hours., Disp: 90 tablet, Rfl: 11  SENNA 8.6 mg tablet, Take 1 tablet by mouth 2 (two) times daily. (Patient taking differently: Take 1 tablet by mouth 2 (two) times daily. prn), Disp: 30 tablet, Rfl: 3  traZODone (DESYREL) 100 MG tablet, Take 1 tablet (100 mg total) by mouth nightly as needed for Insomnia., Disp: 90 tablet, Rfl: 3  walker (ULTRA-LIGHT ROLLATOR) Misc, As directed, Disp: 1 each, Rfl: 0    No current facility-administered medications for this visit.  Facility-Administered Medications Ordered in Other Visits:  sodium chloride 0.9% bolus 1,000 mL, 1,000 mL, Intravenous, Once, Laurie Montgomery NP      Review of Systems   Constitutional: Positive for malaise/fatigue.   Cardiovascular:  Positive for dyspnea on exertion. Negative for chest pain, irregular heartbeat, leg swelling and palpitations.   Respiratory:  Negative for shortness of breath.    Hematologic/Lymphatic: Bruises/bleeds easily.   Skin:  Negative for rash.   Musculoskeletal:  Negative for myalgias.   Gastrointestinal:  Negative for hematemesis, hematochezia and nausea.   Genitourinary:  Negative for hematuria.   Neurological:  Negative for light-headedness.   Psychiatric/Behavioral:  Negative for altered mental status.    Allergic/Immunologic: Negative for persistent  infections.     Objective:   Physical Exam  Vitals and nursing note reviewed.   Constitutional:       Appearance: Normal appearance. She is well-developed.   HENT:      Head: Normocephalic.      Nose: Nose normal.   Eyes:      Pupils: Pupils are equal, round, and reactive to light.   Cardiovascular:      Rate and Rhythm: Normal rate and regular rhythm.   Pulmonary:      Effort: No respiratory distress.      Breath sounds: Normal breath sounds.   Musculoskeletal:         General: Normal range of motion.   Skin:     General: Skin is warm and dry.      Findings: No erythema.   Neurological:      Mental Status: She is alert and oriented to person, place, and time.   Psychiatric:         Speech: Speech normal.         Behavior: Behavior normal.     ECg: SR with PACs    Assessment:      1. Atrial Fibrillation (paroxysmal)    2. Essential hypertension        Plan:   57 yoF here for AF management. She has no sustained arrhythmias since her last ablation. She has developed fatigue and Fe deficient anemia. I offered LAAO due to elevated bleeding and stroke risks. The patient can tolerate short term OAC but is not a candidate for long term OAC due to recurrent bleeding issues. I discussed management options regarding LAAO. I discussed the process of LAAO via Watchman including pre-procedure testing  as well as the need to take OAC for 6 weeks post implant. We discussed post procedure REENA studies to assess EMILIANO occlusion. Additionally I mentioned the need to take DAPT up to the 6 month point post implant.      Watchman with Anesthesia support

## 2023-04-11 ENCOUNTER — OFFICE VISIT (OUTPATIENT)
Dept: ELECTROPHYSIOLOGY | Facility: CLINIC | Age: 58
End: 2023-04-11
Payer: COMMERCIAL

## 2023-04-11 ENCOUNTER — HOSPITAL ENCOUNTER (OUTPATIENT)
Dept: CARDIOLOGY | Facility: CLINIC | Age: 58
Discharge: HOME | End: 2023-04-11
Payer: COMMERCIAL

## 2023-04-11 VITALS
SYSTOLIC BLOOD PRESSURE: 118 MMHG | BODY MASS INDEX: 44.89 KG/M2 | HEIGHT: 66 IN | DIASTOLIC BLOOD PRESSURE: 60 MMHG | WEIGHT: 279.31 LBS | HEART RATE: 65 BPM

## 2023-04-11 DIAGNOSIS — I48.0 PAF (PAROXYSMAL ATRIAL FIBRILLATION): Primary | ICD-10-CM

## 2023-04-11 DIAGNOSIS — I48.11 LONGSTANDING PERSISTENT ATRIAL FIBRILLATION: Primary | ICD-10-CM

## 2023-04-11 DIAGNOSIS — I48.91 ATRIAL FIBRILLATION, UNSPECIFIED TYPE: ICD-10-CM

## 2023-04-11 DIAGNOSIS — I10 ESSENTIAL HYPERTENSION: ICD-10-CM

## 2023-04-11 PROCEDURE — 3074F PR MOST RECENT SYSTOLIC BLOOD PRESSURE < 130 MM HG: ICD-10-PCS | Mod: CPTII,S$GLB,, | Performed by: INTERNAL MEDICINE

## 2023-04-11 PROCEDURE — 93010 RHYTHM STRIP: ICD-10-PCS | Mod: S$GLB,,, | Performed by: INTERNAL MEDICINE

## 2023-04-11 PROCEDURE — 3008F PR BODY MASS INDEX (BMI) DOCUMENTED: ICD-10-PCS | Mod: CPTII,S$GLB,, | Performed by: INTERNAL MEDICINE

## 2023-04-11 PROCEDURE — 99999 PR PBB SHADOW E&M-EST. PATIENT-LVL III: CPT | Mod: PBBFAC,,, | Performed by: INTERNAL MEDICINE

## 2023-04-11 PROCEDURE — 3078F PR MOST RECENT DIASTOLIC BLOOD PRESSURE < 80 MM HG: ICD-10-PCS | Mod: CPTII,S$GLB,, | Performed by: INTERNAL MEDICINE

## 2023-04-11 PROCEDURE — 99215 OFFICE O/P EST HI 40 MIN: CPT | Mod: S$GLB,,, | Performed by: INTERNAL MEDICINE

## 2023-04-11 PROCEDURE — 93010 ELECTROCARDIOGRAM REPORT: CPT | Mod: S$GLB,,, | Performed by: INTERNAL MEDICINE

## 2023-04-11 PROCEDURE — 3072F PR LOW RISK FOR RETINOPATHY: ICD-10-PCS | Mod: CPTII,S$GLB,, | Performed by: INTERNAL MEDICINE

## 2023-04-11 PROCEDURE — 93005 ELECTROCARDIOGRAM TRACING: CPT | Mod: S$GLB,,, | Performed by: INTERNAL MEDICINE

## 2023-04-11 PROCEDURE — 4010F ACE/ARB THERAPY RXD/TAKEN: CPT | Mod: CPTII,S$GLB,, | Performed by: INTERNAL MEDICINE

## 2023-04-11 PROCEDURE — 3008F BODY MASS INDEX DOCD: CPT | Mod: CPTII,S$GLB,, | Performed by: INTERNAL MEDICINE

## 2023-04-11 PROCEDURE — 3074F SYST BP LT 130 MM HG: CPT | Mod: CPTII,S$GLB,, | Performed by: INTERNAL MEDICINE

## 2023-04-11 PROCEDURE — 99215 PR OFFICE/OUTPT VISIT, EST, LEVL V, 40-54 MIN: ICD-10-PCS | Mod: S$GLB,,, | Performed by: INTERNAL MEDICINE

## 2023-04-11 PROCEDURE — 99999 PR PBB SHADOW E&M-EST. PATIENT-LVL III: ICD-10-PCS | Mod: PBBFAC,,, | Performed by: INTERNAL MEDICINE

## 2023-04-11 PROCEDURE — 1159F MED LIST DOCD IN RCRD: CPT | Mod: CPTII,S$GLB,, | Performed by: INTERNAL MEDICINE

## 2023-04-11 PROCEDURE — 4010F PR ACE/ARB THEARPY RXD/TAKEN: ICD-10-PCS | Mod: CPTII,S$GLB,, | Performed by: INTERNAL MEDICINE

## 2023-04-11 PROCEDURE — 93005 RHYTHM STRIP: ICD-10-PCS | Mod: S$GLB,,, | Performed by: INTERNAL MEDICINE

## 2023-04-11 PROCEDURE — 3078F DIAST BP <80 MM HG: CPT | Mod: CPTII,S$GLB,, | Performed by: INTERNAL MEDICINE

## 2023-04-11 PROCEDURE — 3072F LOW RISK FOR RETINOPATHY: CPT | Mod: CPTII,S$GLB,, | Performed by: INTERNAL MEDICINE

## 2023-04-11 PROCEDURE — 1159F PR MEDICATION LIST DOCUMENTED IN MEDICAL RECORD: ICD-10-PCS | Mod: CPTII,S$GLB,, | Performed by: INTERNAL MEDICINE

## 2023-04-11 NOTE — Clinical Note
Cory, I saw Mrs Alvarado today and offered her a Watchman due to fatigue and Fe deficient anemia. If you agree, could you update your last note and/or see her to discuss Watchman as part of shared decision making. thanks

## 2023-04-12 ENCOUNTER — TELEPHONE (OUTPATIENT)
Dept: HEMATOLOGY/ONCOLOGY | Facility: CLINIC | Age: 58
End: 2023-04-12
Payer: COMMERCIAL

## 2023-04-12 ENCOUNTER — PATIENT MESSAGE (OUTPATIENT)
Dept: HEMATOLOGY/ONCOLOGY | Facility: CLINIC | Age: 58
End: 2023-04-12
Payer: COMMERCIAL

## 2023-04-18 ENCOUNTER — LAB VISIT (OUTPATIENT)
Dept: LAB | Facility: HOSPITAL | Age: 58
End: 2023-04-18
Attending: INTERNAL MEDICINE
Payer: COMMERCIAL

## 2023-04-18 ENCOUNTER — OFFICE VISIT (OUTPATIENT)
Dept: HEPATOLOGY | Facility: CLINIC | Age: 58
End: 2023-04-18
Attending: INTERNAL MEDICINE
Payer: COMMERCIAL

## 2023-04-18 ENCOUNTER — TELEPHONE (OUTPATIENT)
Dept: HEPATOLOGY | Facility: CLINIC | Age: 58
End: 2023-04-18

## 2023-04-18 VITALS — RESPIRATION RATE: 18 BRPM | BODY MASS INDEX: 44.39 KG/M2 | HEIGHT: 66 IN | WEIGHT: 276.25 LBS

## 2023-04-18 DIAGNOSIS — K76.0 NAFLD (NONALCOHOLIC FATTY LIVER DISEASE): Chronic | ICD-10-CM

## 2023-04-18 DIAGNOSIS — K76.0 NAFLD (NONALCOHOLIC FATTY LIVER DISEASE): ICD-10-CM

## 2023-04-18 DIAGNOSIS — R74.8 ELEVATED LIVER ENZYMES: Chronic | ICD-10-CM

## 2023-04-18 DIAGNOSIS — R74.8 ELEVATED LIVER ENZYMES: Primary | ICD-10-CM

## 2023-04-18 LAB
AFP SERPL-MCNC: 4.5 NG/ML (ref 0–8.4)
ALBUMIN SERPL BCP-MCNC: 3.4 G/DL (ref 3.5–5.2)
ALP SERPL-CCNC: 222 U/L (ref 55–135)
ALT SERPL W/O P-5'-P-CCNC: 10 U/L (ref 10–44)
ANION GAP SERPL CALC-SCNC: 18 MMOL/L (ref 8–16)
AST SERPL-CCNC: 34 U/L (ref 10–40)
BASOPHILS # BLD AUTO: 0.08 K/UL (ref 0–0.2)
BASOPHILS NFR BLD: 1.1 % (ref 0–1.9)
BILIRUB SERPL-MCNC: 1.4 MG/DL (ref 0.1–1)
BUN SERPL-MCNC: 11 MG/DL (ref 6–20)
CALCIUM SERPL-MCNC: 9.3 MG/DL (ref 8.7–10.5)
CHLORIDE SERPL-SCNC: 98 MMOL/L (ref 95–110)
CO2 SERPL-SCNC: 25 MMOL/L (ref 23–29)
CREAT SERPL-MCNC: 1 MG/DL (ref 0.5–1.4)
DIFFERENTIAL METHOD: ABNORMAL
EOSINOPHIL # BLD AUTO: 0.1 K/UL (ref 0–0.5)
EOSINOPHIL NFR BLD: 1.4 % (ref 0–8)
ERYTHROCYTE [DISTWIDTH] IN BLOOD BY AUTOMATED COUNT: 19.5 % (ref 11.5–14.5)
EST. GFR  (NO RACE VARIABLE): >60 ML/MIN/1.73 M^2
GLUCOSE SERPL-MCNC: 99 MG/DL (ref 70–110)
HCT VFR BLD AUTO: 33.3 % (ref 37–48.5)
HGB BLD-MCNC: 9.6 G/DL (ref 12–16)
IMM GRANULOCYTES # BLD AUTO: 0.02 K/UL (ref 0–0.04)
IMM GRANULOCYTES NFR BLD AUTO: 0.3 % (ref 0–0.5)
INR PPP: 1.2 (ref 0.8–1.2)
LYMPHOCYTES # BLD AUTO: 0.9 K/UL (ref 1–4.8)
LYMPHOCYTES NFR BLD: 11.6 % (ref 18–48)
MCH RBC QN AUTO: 23.4 PG (ref 27–31)
MCHC RBC AUTO-ENTMCNC: 28.8 G/DL (ref 32–36)
MCV RBC AUTO: 81 FL (ref 82–98)
MONOCYTES # BLD AUTO: 0.4 K/UL (ref 0.3–1)
MONOCYTES NFR BLD: 4.9 % (ref 4–15)
NEUTROPHILS # BLD AUTO: 5.9 K/UL (ref 1.8–7.7)
NEUTROPHILS NFR BLD: 80.7 % (ref 38–73)
NRBC BLD-RTO: 0 /100 WBC
PLATELET # BLD AUTO: 254 K/UL (ref 150–450)
PMV BLD AUTO: 11 FL (ref 9.2–12.9)
POTASSIUM SERPL-SCNC: 3.1 MMOL/L (ref 3.5–5.1)
PROT SERPL-MCNC: 7.9 G/DL (ref 6–8.4)
PROTHROMBIN TIME: 12.5 SEC (ref 9–12.5)
RBC # BLD AUTO: 4.1 M/UL (ref 4–5.4)
SODIUM SERPL-SCNC: 141 MMOL/L (ref 136–145)
WBC # BLD AUTO: 7.35 K/UL (ref 3.9–12.7)

## 2023-04-18 PROCEDURE — 99999 PR PBB SHADOW E&M-EST. PATIENT-LVL V: ICD-10-PCS | Mod: PBBFAC,,, | Performed by: INTERNAL MEDICINE

## 2023-04-18 PROCEDURE — 1159F PR MEDICATION LIST DOCUMENTED IN MEDICAL RECORD: ICD-10-PCS | Mod: CPTII,S$GLB,, | Performed by: INTERNAL MEDICINE

## 2023-04-18 PROCEDURE — 99204 OFFICE O/P NEW MOD 45 MIN: CPT | Mod: S$GLB,,, | Performed by: INTERNAL MEDICINE

## 2023-04-18 PROCEDURE — 3072F LOW RISK FOR RETINOPATHY: CPT | Mod: CPTII,S$GLB,, | Performed by: INTERNAL MEDICINE

## 2023-04-18 PROCEDURE — 1159F MED LIST DOCD IN RCRD: CPT | Mod: CPTII,S$GLB,, | Performed by: INTERNAL MEDICINE

## 2023-04-18 PROCEDURE — 99204 PR OFFICE/OUTPT VISIT, NEW, LEVL IV, 45-59 MIN: ICD-10-PCS | Mod: S$GLB,,, | Performed by: INTERNAL MEDICINE

## 2023-04-18 PROCEDURE — 85025 COMPLETE CBC W/AUTO DIFF WBC: CPT | Performed by: INTERNAL MEDICINE

## 2023-04-18 PROCEDURE — 85610 PROTHROMBIN TIME: CPT | Performed by: INTERNAL MEDICINE

## 2023-04-18 PROCEDURE — 82105 ALPHA-FETOPROTEIN SERUM: CPT | Performed by: INTERNAL MEDICINE

## 2023-04-18 PROCEDURE — 4010F PR ACE/ARB THEARPY RXD/TAKEN: ICD-10-PCS | Mod: CPTII,S$GLB,, | Performed by: INTERNAL MEDICINE

## 2023-04-18 PROCEDURE — 3008F BODY MASS INDEX DOCD: CPT | Mod: CPTII,S$GLB,, | Performed by: INTERNAL MEDICINE

## 2023-04-18 PROCEDURE — 3008F PR BODY MASS INDEX (BMI) DOCUMENTED: ICD-10-PCS | Mod: CPTII,S$GLB,, | Performed by: INTERNAL MEDICINE

## 2023-04-18 PROCEDURE — 36415 COLL VENOUS BLD VENIPUNCTURE: CPT | Performed by: INTERNAL MEDICINE

## 2023-04-18 PROCEDURE — 4010F ACE/ARB THERAPY RXD/TAKEN: CPT | Mod: CPTII,S$GLB,, | Performed by: INTERNAL MEDICINE

## 2023-04-18 PROCEDURE — 3072F PR LOW RISK FOR RETINOPATHY: ICD-10-PCS | Mod: CPTII,S$GLB,, | Performed by: INTERNAL MEDICINE

## 2023-04-18 PROCEDURE — 99999 PR PBB SHADOW E&M-EST. PATIENT-LVL V: CPT | Mod: PBBFAC,,, | Performed by: INTERNAL MEDICINE

## 2023-04-18 PROCEDURE — 1160F PR REVIEW ALL MEDS BY PRESCRIBER/CLIN PHARMACIST DOCUMENTED: ICD-10-PCS | Mod: CPTII,S$GLB,, | Performed by: INTERNAL MEDICINE

## 2023-04-18 PROCEDURE — 1160F RVW MEDS BY RX/DR IN RCRD: CPT | Mod: CPTII,S$GLB,, | Performed by: INTERNAL MEDICINE

## 2023-04-18 PROCEDURE — 80053 COMPREHEN METABOLIC PANEL: CPT | Performed by: INTERNAL MEDICINE

## 2023-04-18 NOTE — PROGRESS NOTES
HEPATOLOGY CONSULTATION    Referring Physician:   Current Corresponding Physician: Dr. Gordy Cordero    Reason for Consultation: Consultation for evaluation of Elevated Hepatic Enzymes    History of Present Illness: Vicky Alvarado is a 57 y.o. femalewho presents for evaluation of   Chief Complaint   Patient presents with    Elevated Hepatic Enzymes   This 57-year-old woman was referred for elevated liver enzymes and a possible nodular liver.  She does have risk factors for nonalcoholic fatty liver disease including type 2 diabetes and a body mass index of 44.6.  Her liver synthetic function is normal.  She is no prior history of chronic liver disease.  She had a laparoscopic hysterectomy in August of last year and at that time her gynecologist told her that her liver appeared to be nodular.  She has no symptoms of hepatic decompensation.  She does not drink alcohol.    Past Medical History:   Diagnosis Date    Anticoagulant long-term use     Arthritis     Atrial fibrillation     cardioversion    Atrial fibrillation with RVR     Avascular necrosis     L hand    CHF (congestive heart failure)     Depression     Encounter for blood transfusion 07/22/2020    Encounter for blood transfusion 03/2022    GERD (gastroesophageal reflux disease)     Hx of psychiatric care     Hypertension     Iron deficiency anemia 5/7/2022    TIBC 444 with saturated iron 8    Obese     Pre-diabetes 5/7/2022    Psychiatric problem     Sleep apnea     wears cpap     Outpatient Encounter Medications as of 4/18/2023   Medication Sig Dispense Refill    albuterol (VENTOLIN HFA) 90 mcg/actuation inhaler Inhale 2 puffs into the lungs every 6 (six) hours as needed for Wheezing. Rescue 18 g 0    ALPRAZolam (XANAX) 0.5 MG tablet Take 1 tablet (0.5 mg total) by mouth 2 (two) times daily as needed for Anxiety. 60 tablet 4    apixaban (ELIQUIS) 5 mg Tab Take 1 tablet (5 mg total) by mouth 2 (two) times daily. 90 tablet 5    atorvastatin (LIPITOR) 80  MG tablet Take 1 tablet (80 mg total) by mouth once daily. 90 tablet 3    b complex vitamins capsule Take 1 capsule by mouth once daily.      colchicine (COLCRYS) 0.6 mg tablet For gout attack: Take TWO tablets by mouth, then, 2 hours later, take ONE additional tablet. Then take 1 tablet twice daily only if still needed for gout pain. 20 tablet 4    DULoxetine (CYMBALTA) 60 MG capsule Take 2 capsules (120 mg total) by mouth once daily. 180 capsule 3    ferrous sulfate (SLOW RELEASE IRON) 142 mg (45 mg iron) TbSR Take 1 tablet (142 mg total) by mouth once daily. FOR 7 DAY THEN INCREASE TO TWICE DAILY AS TOLERATED      furosemide (LASIX) 40 MG tablet Take 1 tablet (40 mg total) by mouth 2 (two) times daily. Resume as needed for edema until followup with your PCP. 60 tablet 11    gluc-shikha-[facility] [place]#6-[id number]-zqic-reymundo-bor 750 mg-644 mg- 30 mg-1 mg Tab Take 1 tablet by mouth once daily.       ibuprofen (ADVIL,MOTRIN) 600 MG tablet Take 1 tablet (600 mg total) by mouth every 6 (six) hours as needed for Pain. 30 tablet 1    krill/om-3/dha/epa/phospho/ast (MEGARED OMEGA-3 KRILL OIL ORAL) Take 1 capsule by mouth once daily.      lisinopriL (PRINIVIL,ZESTRIL) 40 MG tablet Take 1 tablet (40 mg total) by mouth once daily. 90 tablet 3    melatonin 10 mg Tab Take 1-2 tablets by mouth nightly as needed (Sleep).      metoprolol succinate (TOPROL-XL) 50 MG 24 hr tablet Take 4 tablets (200 mg total) by mouth once daily. 180 tablet 3    multivitamin (THERAGRAN) per tablet Take 1 tablet by mouth once daily.      NIFEdipine (PROCARDIA-XL) 90 MG (OSM) 24 hr tablet Take 1 tablet (90 mg total) by mouth once daily. HOLD UNTIL FOLLOW-UP WITH YOUR PCP. 30 tablet 11    omeprazole (PRILOSEC) 20 MG capsule TAKE 1 CAPSULE BY MOUTH ONCE DAILY 90 capsule 3    oxyCODONE-acetaminophen (PERCOCET) 7.5-325 mg per tablet Take 1 tablet by mouth every 8 (eight) hours as needed for Pain. 20 tablet 0    potassium chloride SA (K-DUR,KLOR-CON) 20 MEQ tablet Take 1 tablet  (20 mEq total) by mouth once daily. ONLY TAKE WITH LASIX. 30 tablet 11    predniSONE (DELTASONE) 10 MG tablet Take 1 tablet by mouth daily 10 tablet 0    propafenone (RYTHMOL) 225 MG Tab Take 1 tablet (225 mg total) by mouth every 8 (eight) hours. 90 tablet 11    SENNA 8.6 mg tablet Take 1 tablet by mouth 2 (two) times daily. (Patient taking differently: Take 1 tablet by mouth 2 (two) times daily. prn) 30 tablet 3    traZODone (DESYREL) 100 MG tablet Take 1 tablet (100 mg total) by mouth nightly as needed for Insomnia. 90 tablet 3    walker (ULTRA-LIGHT ROLLATOR) Misc As directed 1 each 0    [] guaiFENesin-codeine 100-10 mg/5 ml (TUSSI-ORGANIDIN NR)  mg/5 mL syrup Take 5 mLs by mouth 3 (three) times daily as needed for Cough. 236 mL 0    [DISCONTINUED] furosemide (LASIX) 40 MG tablet Take 1 tablet (40 mg total) by mouth 2 (two) times daily. Resume as needed for edema until followup with your PCP. 60 tablet 11    [DISCONTINUED] medroxyPROGESTERone (PROVERA) 10 MG tablet Take 2 tablets (20 mg total) by mouth 3 (three) times daily. 180 tablet 0    [DISCONTINUED] metoprolol succinate (TOPROL-XL) 50 MG 24 hr tablet Take 3 tablets (150 mg total) by mouth 2 (two) times daily. 180 tablet 3    [DISCONTINUED] NIFEdipine (PROCARDIA-XL) 90 MG (OSM) 24 hr tablet Take 1 tablet (90 mg total) by mouth once daily. HOLD UNTIL FOLLOW-UP WITH YOUR PCP. 30 tablet 11    [DISCONTINUED] pantoprazole (PROTONIX) 40 MG tablet Take 1 tablet (40 mg total) by mouth once daily. (Patient not taking: Reported on 2022) 30 tablet 0     Facility-Administered Encounter Medications as of 2023   Medication Dose Route Frequency Provider Last Rate Last Admin    sodium chloride 0.9% bolus 1,000 mL  1,000 mL Intravenous Once Laurie Montgomery NP         Review of patient's allergies indicates:   Allergen Reactions    Flecainide Shortness Of Breath and Swelling    Vancomycin analogues Hives, Itching and Rash     Family History   Problem  Relation Age of Onset    Cataracts Mother     Hypertension Mother     Thyroid disease Mother     Diabetes Father     Hypertension Father     Hypertension Sister     Hypertension Brother     Amblyopia Neg Hx     Blindness Neg Hx     Cancer Neg Hx     Glaucoma Neg Hx     Macular degeneration Neg Hx     Retinal detachment Neg Hx     Strabismus Neg Hx     Stroke Neg Hx     Breast cancer Neg Hx     Colon cancer Neg Hx     Ovarian cancer Neg Hx        Social History     Socioeconomic History    Marital status:    Tobacco Use    Smoking status: Former     Types: Cigarettes     Quit date: 7/1/2019     Years since quitting: 3.8    Smokeless tobacco: Never   Substance and Sexual Activity    Alcohol use: No    Drug use: No    Sexual activity: Not Currently     Partners: Male     Birth control/protection: See Surgical Hx     Social Determinants of Health     Financial Resource Strain: Low Risk     Difficulty of Paying Living Expenses: Not very hard   Food Insecurity: No Food Insecurity    Worried About Running Out of Food in the Last Year: Never true    Ran Out of Food in the Last Year: Never true   Transportation Needs: No Transportation Needs    Lack of Transportation (Medical): No    Lack of Transportation (Non-Medical): No   Physical Activity: Inactive    Days of Exercise per Week: 0 days    Minutes of Exercise per Session: 0 min   Stress: Stress Concern Present    Feeling of Stress : To some extent   Social Connections: Moderately Isolated    Frequency of Communication with Friends and Family: More than three times a week    Frequency of Social Gatherings with Friends and Family: More than three times a week    Attends Confucianism Services: 1 to 4 times per year    Active Member of Clubs or Organizations: No    Attends Club or Organization Meetings: Never    Marital Status:    Housing Stability: Unknown    Unable to Pay for Housing in the Last Year: No    Unstable Housing in the Last Year: No     Review of  Systems   Constitutional:  Positive for fatigue. Negative for appetite change and unexpected weight change.   HENT:  Negative for ear pain, hearing loss, sore throat and trouble swallowing.    Eyes:  Negative for visual disturbance.   Respiratory:  Negative for cough and shortness of breath.    Cardiovascular:  Negative for chest pain and palpitations.   Gastrointestinal:  Negative for abdominal pain, nausea and vomiting.   Genitourinary:  Negative for difficulty urinating and dysuria.   Musculoskeletal:  Negative for arthralgias and back pain.   Skin:  Negative for rash.   Neurological:  Negative for tremors, seizures and headaches.   Psychiatric/Behavioral:  Negative for agitation and decreased concentration.    Vitals:    04/18/23 1447   Resp: 18       Physical Exam  Constitutional:       Appearance: She is well-developed. She is not diaphoretic.   HENT:      Right Ear: External ear normal.      Left Ear: External ear normal.   Eyes:      General: No scleral icterus.  Cardiovascular:      Rate and Rhythm: Normal rate and regular rhythm.      Heart sounds: Normal heart sounds. No murmur heard.    No friction rub. No gallop.   Pulmonary:      Effort: Pulmonary effort is normal. No respiratory distress.      Breath sounds: Normal breath sounds. No wheezing.   Abdominal:      General: Bowel sounds are normal. There is no distension.      Palpations: Abdomen is soft. There is no mass.      Tenderness: There is no abdominal tenderness.   Musculoskeletal:         General: Normal range of motion.   Lymphadenopathy:      Cervical: No cervical adenopathy.   Skin:     General: Skin is warm and dry.      Coloration: Skin is not pale.   Neurological:      Mental Status: She is alert and oriented to person, place, and time.       Computed MELD-Na score unavailable. Necessary lab results were not found in the last year.  Computed MELD score unavailable. Necessary lab results were not found in the last year.    Lab Results    Component Value Date     03/31/2023    BUN 8 03/31/2023    CREATININE 0.8 03/31/2023    CALCIUM 9.1 03/31/2023     03/31/2023    K 3.9 03/31/2023     03/31/2023    PROT 7.1 03/31/2023    CO2 27 03/31/2023    ANIONGAP 12 03/31/2023    WBC 7.13 03/31/2023    RBC 3.65 (L) 03/31/2023    HGB 8.3 (L) 03/31/2023    HCT 30.8 (L) 03/31/2023    HCT 28 (L) 08/11/2022    MCV 84 03/31/2023    MCH 22.7 (L) 03/31/2023    MCHC 26.9 (L) 03/31/2023     Lab Results   Component Value Date    RDW 20.8 (H) 03/31/2023     03/31/2023    MPV 11.4 03/31/2023    GRAN 5.4 03/31/2023    GRAN 76.0 (H) 03/31/2023    LYMPH 1.0 03/31/2023    LYMPH 13.3 (L) 03/31/2023    MONO 0.5 03/31/2023    MONO 7.2 03/31/2023    EOSINOPHIL 2.1 03/31/2023    BASOPHIL 1.0 03/31/2023    EOS 0.2 03/31/2023    BASO 0.07 03/31/2023    APTT 23.3 02/07/2023    GROUPTRH O NEG 02/07/2023    BNP 86 01/06/2021    CHOL 164 07/06/2021    TRIG 92 07/06/2021    HDL 53 07/06/2021    CHOLHDL 32.3 07/06/2021    TOTALCHOLEST 3.1 07/06/2021    ALBUMIN 2.9 (L) 03/31/2023    AST 45 (H) 03/31/2023    ALT 11 03/31/2023    ALKPHOS 247 (H) 03/31/2023    MG 1.6 08/10/2022    LABPROT 10.9 02/07/2023    INR 1.1 02/07/2023       Assessment and Plan:  Patient Active Problem List   Diagnosis    Opiate dependence    Opioid dependence in remission    Essential hypertension    Atrial Fibrillation (paroxysmal)    Avascular necrosis of lunate    History of opioid abuse    Morbid obesity with BMI of 45.0-49.9, adult    Type 2 diabetes mellitus with diabetic polyneuropathy, without long-term current use of insulin    Right carpal tunnel syndrome    Weakness of right hand    ERENDIRA (obstructive sleep apnea)    Depression    Carpal tunnel syndrome    Ulnar neuropathy at elbow of left upper extremity    Range of motion deficit    Edema of hand    Pure hypercholesterolemia    Mild aortic stenosis    Microcytic anemia    Colon cancer screening    Vaginal bleeding     Gastroesophageal reflux disease without esophagitis    Recurrent major depressive disorder, in partial remission    Thrombocytopenia    Acute post-hemorrhagic anemia    Pre-diabetes    Iron deficiency anemia    Cellulitis of extremity    Left leg cellulitis    Volume overload    s/p RA-TLH/BSO    Laryngeal edema    Longstanding persistent atrial fibrillation    Abnormal nuclear stress test    Elevated liver enzymes    NAFLD (nonalcoholic fatty liver disease)     Vicky Alvarado is a 57 y.o. female withElevated Hepatic Enzymes    Impression:  Likely nonalcoholic fatty liver disease likely nonalcoholic fatty liver disease.  Rule out cirrhosis.    Plan:  I explained to the patient that the only treatment we have for fatty liver disease is weight loss and I recommend diet modification and exercise.   I will be arranging a transjugular liver biopsy with pressure measurements to determine whether or not she has cirrhosis histologically.  If she does indeed have cirrhosis I will enter her into a hepatoma surveillance program.  I have ordered some blood tests for today.  She will return to clinic in 6 months time.

## 2023-04-18 NOTE — TELEPHONE ENCOUNTER
Patient seen in clinic today 4/18/23 with Dr Saba. New Orders to follow.    Labs today.  Patient will need TJ Liver Biopsy with Pressure Measurements.  The patient is on Eliquis.    Patient given TJ Liver Biopsy packet. Pt stated she is familiar with it. I am a Preop nurse.    Will send the message to IR for an appointment.

## 2023-04-20 ENCOUNTER — PATIENT MESSAGE (OUTPATIENT)
Dept: TRANSPLANT | Facility: CLINIC | Age: 58
End: 2023-04-20
Payer: COMMERCIAL

## 2023-04-20 DIAGNOSIS — E87.6 HYPOKALEMIA: Primary | ICD-10-CM

## 2023-04-20 RX ORDER — POTASSIUM CHLORIDE 1.5 G/1.58G
20 POWDER, FOR SOLUTION ORAL 4 TIMES DAILY
Qty: 30 PACKET | Refills: 3 | Status: ON HOLD | OUTPATIENT
Start: 2023-04-20 | End: 2023-06-12 | Stop reason: HOSPADM

## 2023-04-21 ENCOUNTER — PATIENT MESSAGE (OUTPATIENT)
Dept: BARIATRICS | Facility: CLINIC | Age: 58
End: 2023-04-21

## 2023-04-21 ENCOUNTER — PATIENT MESSAGE (OUTPATIENT)
Dept: HEPATOLOGY | Facility: CLINIC | Age: 58
End: 2023-04-21
Payer: COMMERCIAL

## 2023-04-21 ENCOUNTER — TELEPHONE (OUTPATIENT)
Dept: BARIATRICS | Facility: CLINIC | Age: 58
End: 2023-04-21
Payer: COMMERCIAL

## 2023-04-21 ENCOUNTER — OFFICE VISIT (OUTPATIENT)
Dept: BARIATRICS | Facility: CLINIC | Age: 58
End: 2023-04-21
Payer: COMMERCIAL

## 2023-04-21 ENCOUNTER — INFUSION (OUTPATIENT)
Dept: INFUSION THERAPY | Facility: HOSPITAL | Age: 58
End: 2023-04-21
Payer: COMMERCIAL

## 2023-04-21 VITALS
DIASTOLIC BLOOD PRESSURE: 72 MMHG | SYSTOLIC BLOOD PRESSURE: 122 MMHG | HEART RATE: 64 BPM | BODY MASS INDEX: 50.74 KG/M2 | WEIGHT: 286.38 LBS | OXYGEN SATURATION: 96 % | HEIGHT: 63 IN

## 2023-04-21 VITALS
RESPIRATION RATE: 18 BRPM | HEART RATE: 66 BPM | SYSTOLIC BLOOD PRESSURE: 112 MMHG | DIASTOLIC BLOOD PRESSURE: 53 MMHG | OXYGEN SATURATION: 97 % | TEMPERATURE: 98 F

## 2023-04-21 DIAGNOSIS — E11.42 TYPE 2 DIABETES MELLITUS WITH DIABETIC POLYNEUROPATHY, WITHOUT LONG-TERM CURRENT USE OF INSULIN: Chronic | ICD-10-CM

## 2023-04-21 DIAGNOSIS — E66.01 CLASS 3 SEVERE OBESITY DUE TO EXCESS CALORIES WITH SERIOUS COMORBIDITY AND BODY MASS INDEX (BMI) OF 50.0 TO 59.9 IN ADULT: Primary | ICD-10-CM

## 2023-04-21 DIAGNOSIS — K76.0 NAFLD (NONALCOHOLIC FATTY LIVER DISEASE): Chronic | ICD-10-CM

## 2023-04-21 DIAGNOSIS — I10 ESSENTIAL HYPERTENSION: ICD-10-CM

## 2023-04-21 DIAGNOSIS — D50.0 IRON DEFICIENCY ANEMIA DUE TO CHRONIC BLOOD LOSS: Primary | ICD-10-CM

## 2023-04-21 DIAGNOSIS — G47.33 OSA (OBSTRUCTIVE SLEEP APNEA): ICD-10-CM

## 2023-04-21 DIAGNOSIS — E78.00 PURE HYPERCHOLESTEROLEMIA: ICD-10-CM

## 2023-04-21 PROCEDURE — 3008F BODY MASS INDEX DOCD: CPT | Mod: CPTII,S$GLB,, | Performed by: STUDENT IN AN ORGANIZED HEALTH CARE EDUCATION/TRAINING PROGRAM

## 2023-04-21 PROCEDURE — 1159F MED LIST DOCD IN RCRD: CPT | Mod: CPTII,S$GLB,, | Performed by: STUDENT IN AN ORGANIZED HEALTH CARE EDUCATION/TRAINING PROGRAM

## 2023-04-21 PROCEDURE — 99204 OFFICE O/P NEW MOD 45 MIN: CPT | Mod: S$GLB,,, | Performed by: STUDENT IN AN ORGANIZED HEALTH CARE EDUCATION/TRAINING PROGRAM

## 2023-04-21 PROCEDURE — 3074F SYST BP LT 130 MM HG: CPT | Mod: CPTII,S$GLB,, | Performed by: STUDENT IN AN ORGANIZED HEALTH CARE EDUCATION/TRAINING PROGRAM

## 2023-04-21 PROCEDURE — 3072F LOW RISK FOR RETINOPATHY: CPT | Mod: CPTII,S$GLB,, | Performed by: STUDENT IN AN ORGANIZED HEALTH CARE EDUCATION/TRAINING PROGRAM

## 2023-04-21 PROCEDURE — 4010F ACE/ARB THERAPY RXD/TAKEN: CPT | Mod: CPTII,S$GLB,, | Performed by: STUDENT IN AN ORGANIZED HEALTH CARE EDUCATION/TRAINING PROGRAM

## 2023-04-21 PROCEDURE — 99204 PR OFFICE/OUTPT VISIT, NEW, LEVL IV, 45-59 MIN: ICD-10-PCS | Mod: S$GLB,,, | Performed by: STUDENT IN AN ORGANIZED HEALTH CARE EDUCATION/TRAINING PROGRAM

## 2023-04-21 PROCEDURE — 3074F PR MOST RECENT SYSTOLIC BLOOD PRESSURE < 130 MM HG: ICD-10-PCS | Mod: CPTII,S$GLB,, | Performed by: STUDENT IN AN ORGANIZED HEALTH CARE EDUCATION/TRAINING PROGRAM

## 2023-04-21 PROCEDURE — 99999 PR PBB SHADOW E&M-EST. PATIENT-LVL IV: CPT | Mod: PBBFAC,,, | Performed by: STUDENT IN AN ORGANIZED HEALTH CARE EDUCATION/TRAINING PROGRAM

## 2023-04-21 PROCEDURE — 63600175 PHARM REV CODE 636 W HCPCS: Mod: JZ,JG | Performed by: INTERNAL MEDICINE

## 2023-04-21 PROCEDURE — 4010F PR ACE/ARB THEARPY RXD/TAKEN: ICD-10-PCS | Mod: CPTII,S$GLB,, | Performed by: STUDENT IN AN ORGANIZED HEALTH CARE EDUCATION/TRAINING PROGRAM

## 2023-04-21 PROCEDURE — 1159F PR MEDICATION LIST DOCUMENTED IN MEDICAL RECORD: ICD-10-PCS | Mod: CPTII,S$GLB,, | Performed by: STUDENT IN AN ORGANIZED HEALTH CARE EDUCATION/TRAINING PROGRAM

## 2023-04-21 PROCEDURE — 25000003 PHARM REV CODE 250: Performed by: INTERNAL MEDICINE

## 2023-04-21 PROCEDURE — 3078F DIAST BP <80 MM HG: CPT | Mod: CPTII,S$GLB,, | Performed by: STUDENT IN AN ORGANIZED HEALTH CARE EDUCATION/TRAINING PROGRAM

## 2023-04-21 PROCEDURE — 96365 THER/PROPH/DIAG IV INF INIT: CPT

## 2023-04-21 PROCEDURE — 1160F PR REVIEW ALL MEDS BY PRESCRIBER/CLIN PHARMACIST DOCUMENTED: ICD-10-PCS | Mod: CPTII,S$GLB,, | Performed by: STUDENT IN AN ORGANIZED HEALTH CARE EDUCATION/TRAINING PROGRAM

## 2023-04-21 PROCEDURE — 1160F RVW MEDS BY RX/DR IN RCRD: CPT | Mod: CPTII,S$GLB,, | Performed by: STUDENT IN AN ORGANIZED HEALTH CARE EDUCATION/TRAINING PROGRAM

## 2023-04-21 PROCEDURE — 99999 PR PBB SHADOW E&M-EST. PATIENT-LVL IV: ICD-10-PCS | Mod: PBBFAC,,, | Performed by: STUDENT IN AN ORGANIZED HEALTH CARE EDUCATION/TRAINING PROGRAM

## 2023-04-21 PROCEDURE — 3072F PR LOW RISK FOR RETINOPATHY: ICD-10-PCS | Mod: CPTII,S$GLB,, | Performed by: STUDENT IN AN ORGANIZED HEALTH CARE EDUCATION/TRAINING PROGRAM

## 2023-04-21 PROCEDURE — 3008F PR BODY MASS INDEX (BMI) DOCUMENTED: ICD-10-PCS | Mod: CPTII,S$GLB,, | Performed by: STUDENT IN AN ORGANIZED HEALTH CARE EDUCATION/TRAINING PROGRAM

## 2023-04-21 PROCEDURE — 3078F PR MOST RECENT DIASTOLIC BLOOD PRESSURE < 80 MM HG: ICD-10-PCS | Mod: CPTII,S$GLB,, | Performed by: STUDENT IN AN ORGANIZED HEALTH CARE EDUCATION/TRAINING PROGRAM

## 2023-04-21 RX ORDER — SODIUM CHLORIDE 0.9 % (FLUSH) 0.9 %
10 SYRINGE (ML) INJECTION
Status: CANCELLED | OUTPATIENT
Start: 2023-04-28

## 2023-04-21 RX ORDER — SODIUM CHLORIDE 9 MG/ML
INJECTION, SOLUTION INTRAVENOUS CONTINUOUS
Status: DISCONTINUED | OUTPATIENT
Start: 2023-04-21 | End: 2023-04-21 | Stop reason: HOSPADM

## 2023-04-21 RX ORDER — SODIUM CHLORIDE 9 MG/ML
INJECTION, SOLUTION INTRAVENOUS CONTINUOUS
Status: CANCELLED | OUTPATIENT
Start: 2023-04-28

## 2023-04-21 RX ORDER — TIRZEPATIDE 7.5 MG/.5ML
7.5 INJECTION, SOLUTION SUBCUTANEOUS
Qty: 4 PEN | Refills: 0 | Status: SHIPPED | OUTPATIENT
Start: 2023-06-16 | End: 2023-07-07

## 2023-04-21 RX ORDER — HEPARIN 100 UNIT/ML
5 SYRINGE INTRAVENOUS
Status: CANCELLED | OUTPATIENT
Start: 2023-04-28

## 2023-04-21 RX ORDER — SODIUM CHLORIDE 0.9 % (FLUSH) 0.9 %
10 SYRINGE (ML) INJECTION
Status: DISCONTINUED | OUTPATIENT
Start: 2023-04-21 | End: 2023-04-21 | Stop reason: HOSPADM

## 2023-04-21 RX ORDER — METHYLPREDNISOLONE SOD SUCC 125 MG
125 VIAL (EA) INJECTION ONCE AS NEEDED
Status: CANCELLED | OUTPATIENT
Start: 2023-04-28

## 2023-04-21 RX ORDER — TIRZEPATIDE 5 MG/.5ML
5 INJECTION, SOLUTION SUBCUTANEOUS
Qty: 4 PEN | Refills: 0 | Status: SHIPPED | OUTPATIENT
Start: 2023-05-19 | End: 2023-06-10

## 2023-04-21 RX ORDER — HEPARIN 100 UNIT/ML
5 SYRINGE INTRAVENOUS
Status: DISCONTINUED | OUTPATIENT
Start: 2023-04-21 | End: 2023-04-21 | Stop reason: HOSPADM

## 2023-04-21 RX ORDER — DIPHENHYDRAMINE HYDROCHLORIDE 50 MG/ML
50 INJECTION INTRAMUSCULAR; INTRAVENOUS ONCE AS NEEDED
Status: CANCELLED | OUTPATIENT
Start: 2023-04-28

## 2023-04-21 RX ORDER — TIRZEPATIDE 2.5 MG/.5ML
2.5 INJECTION, SOLUTION SUBCUTANEOUS
Qty: 4 PEN | Refills: 0 | Status: SHIPPED | OUTPATIENT
Start: 2023-04-21 | End: 2023-05-24

## 2023-04-21 RX ORDER — EPINEPHRINE 0.3 MG/.3ML
0.3 INJECTION SUBCUTANEOUS ONCE AS NEEDED
Status: CANCELLED | OUTPATIENT
Start: 2023-04-28

## 2023-04-21 RX ADMIN — SODIUM CHLORIDE: 9 INJECTION, SOLUTION INTRAVENOUS at 02:04

## 2023-04-21 RX ADMIN — FERRIC CARBOXYMALTOSE INJECTION 750 MG: 50 INJECTION, SOLUTION INTRAVENOUS at 03:04

## 2023-04-21 NOTE — TELEPHONE ENCOUNTER
Returned pt's call in regard to ETA of consult appt. Pt stated that she was on her way in from the parking garage. Call was then disconnected.

## 2023-04-21 NOTE — PATIENT INSTRUCTIONS
Start Mounjaro 2.5 mg weekly x 4 weeks, then 5 mg weekly x 4 weeks, then 7.5 mg weekly x 4 weeks.          Copyright © 2011, [facility]. For more information about The Healthy Eating Plate, please see The Nutrition Source, Department of Nutrition, [employer], www.thenutritionsource.org, and [employer], www.health.[employer].edu.      Meal Planning & Grocery Shopping    Meal planning builds the foundation for healthy eating. When you have structured ideas for healthy meals and foods available at home to prepare those meals, weight control becomes easier.  If only healthy foods are available at home, then you will be much more likely to eat healthy foods. And you will be less likely to go to a restaurant or  a fast food meal, which tend to be unhealthy and higher in calories than meals prepared at home.      Take 5-10 minutes each week to plan meals for the next 7 days.  Make a grocery list based on the meal plan.    Grocery Shopping Tips:  Shop on a full stomach.  Schedule your shopping for times when you are most motivated and able to be disciplined about your purchases. For example, after a stressful day at work it may be difficult to make the healthiest choices. Shopping at other times, such as early in the morning or after dinner, may be easier.  Focus your shopping on the outside aisles of the store, which tend to contain more fresh foods and lower calorie foods. The inside aisles tend to have more processed foods.  Stick to your list. Avoid buying unhealthy items just because they are on sale.   Compare nutrition labels to check the number of calories and percentage of fat.      What to buy:    Vegetables  Fresh vegetables  Frozen vegetables with no sauce or added salt  Canned vegetables with no sauce or added salt    Protein  Lean meats, such as chicken and turkey  Limit red meats, such as beef to no more than 1x/week  Limit processed meats, such as  cold cuts, kinsey, sausage, and hot dogs. Look for brands that have no nitrites and are minimally processed. Consider turkey sausage or turkey kinsey.  Fish and Shellfish  Eggs  Dried beans  Canned beans (reduced sodium)    Fat  Use healthy oils, such as olive oil or canola oil, for cooking, salad dressings, etc.  Unflavored nuts and seeds  Nut butters (no added sugar)    Dairy  Yogurt (no sugar added)  Cheese  Low-fat milk  Unsweetened nondairy milks (almond milk, soy milk, etc)    Fruit  Fresh Fruit  Frozen fruit with no added sugar  Canned fruit with no added sugar  Dried fruit with no added sugar  100% fruit juice    Whole Grains  Single ingredient grains, such as oats, quinoa, brown rice  Whole-wheat pasta  Sprouted whole-grain bread    What to avoid:  - Avoid fried foods  - Avoid foods with added sugar  - Avoid sugar-sweetened beverages  - Avoid ultra-processed foods      SEATED RESISTANCE BAND EXERCISES    If you do not have a resistance band, or do not feel comfortable using a resistance band, these exercises can also be done holding a light hand weight or water bottle.  If you are just starting to exercise, you may want to go through the motions without any weights or resistance till you become comfortable with the movements.    Do each of the movements shown 10 times (10 repetitions). You can repeat the exercises a second or  third time as well for greater benefit. The amount of tension on the resistance bands should be adjusted so  you can complete one set of 10 repetitions with effort. Increase the tension every few weeks. Do these  exercises 2 or 3 times a week.    * If an exercise hurts your back or joints, stop doing that particular exercise, but keep doing all the others *      CHEST EXERCISE        Start Position:   Sit tall, with feet shoulder width apart and feet in front of knees.   Belly pulled in.   Place the band around your upper back, grasp band in each hand, knuckles (rings) facing front.  Arms  should be bent so that knuckles are in front of elbows   Slide shoulder blades down your back and slightly together (as if making a V).   Relax your neck.    To Perform This Exercise:   Press hands forward to lengthen arms using chest muscles (not arms!).   Dont arch your back!)   Return to start position and repeat 10 times.   Breathe!        BACK EXERCISE        Start Position:   Sit tall, with feet shoulder width apart and feet in front of knees.   Belly pulled in.   Grasp band in each hand and raise overhead. Arms should be slightly wider than shoulder width and no  slack in the band.   Slide shoulder blades down your back and slightly together (as if making a V).   Relax your neck.    To Perform This Exercise:   Open arms pulling down towards chest using upper back muscles (not arms!).   Squeeze through shoulder blades at the bottom of the movement (dont arch your back!).   Return to start position and repeat 10 times.   Breathe!        SHOULDER EXERCISE        Start Position:   Sit tall, with feet shoulder width apart and feet in front of knees.   Belly pulled in.   Sit on band so that you can grasp band in one hand with tension on the band, but not so much tension that  you cannot straighten the arm. It may take a few tries to find the right amount of tension.   Slide shoulder blades down your back and slightly together (as if making a V).   Relax your neck.    To Perform This Exercise:   Press fist to the ceiling, slightly in front of the body.   SLOWLY return to start position and repeat 10 times.   Switch sides and repeat on the other side.   Breathe!        TRICEPS EXERCISE        Start Position:   Sit tall, with feet shoulder width apart and feet in front of knees.   Belly pulled in.   Sit on one end of the band. Grasp other end of band in one hand.   Point elbow directly toward the ceiling (if this is difficult, you may support the upper arm with the opposite  hand)   Be sure there is no  slack in the band in the starting position.   Slide shoulder blades down your back and slightly together (as if making a V).   Relax your neck.    To Perform This Exercise:   Extend your arm up to the ceiling, as shown.   Squeeze through shoulder blades at the bottom of the movement (dont arch your back!).   SLOWLY return to start position and repeat 10 times.   Repeat on the other side.   Breathe!        BICEP EXERCISE        Start Position:   Sit tall, with feet shoulder width apart and feet in front of knees.   Belly pulled in.   Place center of exercise band under one foot and step the end of the band under the other foot.   Grasp each end of the band with one hand. Make sure there is no slack between your foot and the hand  that holds the band.   Hold your elbows to your sides.   Pull abdominals in, lift chest, press shoulders down and back.    To Perform This Exercise:   As you curl up, keep your wrist from changing position in relation to your forearm and your arm stable  from the shoulder to the elbow.   Bend and straighten your elbow in a slow and controlled movement. Repeat this motion 10 times.   Repeat on the other side.   Breathe!

## 2023-04-21 NOTE — PLAN OF CARE
1421  Patient wheeled into treatment area.  Transferred from wheel chair to chair with stand by assist.  VSS, assessment done. PIV inserted , flushed, blood return noted, started NS @ 50 cc/hr KVO while waiting for Injectafer from pharmacy.  [facility] for safety  
1538  Patient completed D1C1 injectafer infusion, tolerated well.  PIV discontinued without issue.  RTC 4/28/23  Patient transferred from chair to wheel chair with stand by assist.  Patient wheeled into waiting area to wait for Mr. Adair to wheel her from this building to parking garage to discharge home.  
10

## 2023-04-21 NOTE — TELEPHONE ENCOUNTER
----- Message from Armida Mckeon sent at 4/21/2023  1:06 PM CDT -----  Regarding: LATE FOR APPOINTMENT  Contact: [phone]   Calling to inform that she will be fifteen minutes late for appointment. Please call patient if thy cannot be seen today. Thanks!

## 2023-04-21 NOTE — PROGRESS NOTES
Subjective     Patient ID: Vicky Alvarado is a 57 y.o. female.    Chief Complaint: Consult and Obesity    Patient presents for treatment of obesity.     Co-morbidities  HTN  HLD  DM2  ERENDIRA  NAFLD    Negative for thyroid cancer    H/o in hysterectomy    Current Physical Activity  No regular exercise routine    Current Eating Habits  Breakfast - skips  Lunch - skips  Dinner - sister cooks dinner  Snacks  Beverages - water, SF gatorade    Medical Weight Loss  4/21/2023: 286.4 lbs, BMI 50.7, BFP 54.2%, .2 lbs, SMM 71.7 lbs, BMR 1654 kcal    Review of Systems   Constitutional:  Negative for chills and fever.   Respiratory:  Negative for shortness of breath.    Cardiovascular:  Negative for chest pain and palpitations.   Gastrointestinal:  Negative for abdominal pain, nausea and vomiting.   Neurological:  Negative for dizziness and light-headedness.   Psychiatric/Behavioral:  The patient is not nervous/anxious.         Objective    Latest Reference Range & Units 03/31/23 16:14 04/18/23 15:44   WBC 3.90 - 12.70 K/uL 7.13 7.35   RBC 4.00 - 5.40 M/uL 3.65 (L) 4.10   Hemoglobin 12.0 - 16.0 g/dL 8.3 (L) 9.6 (L)   Hematocrit 37.0 - 48.5 % 30.8 (L) 33.3 (L)   MCV 82 - 98 fL 84 81 (L)   MCH 27.0 - 31.0 pg 22.7 (L) 23.4 (L)   MCHC 32.0 - 36.0 g/dL 26.9 (L) 28.8 (L)   RDW 11.5 - 14.5 % 20.8 (H) 19.5 (H)   Platelets 150 - 450 K/uL 166 254   MPV 9.2 - 12.9 fL 11.4 11.0   Gran % 38.0 - 73.0 % 76.0 (H) 80.7 (H)   Lymph % 18.0 - 48.0 % 13.3 (L) 11.6 (L)   Mono % 4.0 - 15.0 % 7.2 4.9   Eosinophil % 0.0 - 8.0 % 2.1 1.4   Basophil % 0.0 - 1.9 % 1.0 1.1   Immature Granulocytes 0.0 - 0.5 % 0.4 0.3   Gran # (ANC) 1.8 - 7.7 K/uL 5.4 5.9   Lymph # 1.0 - 4.8 K/uL 1.0 0.9 (L)   Mono # 0.3 - 1.0 K/uL 0.5 0.4   Eos # 0.0 - 0.5 K/uL 0.2 0.1   Baso # 0.00 - 0.20 K/uL 0.07 0.08   Immature Grans (Abs) 0.00 - 0.04 K/uL 0.03 0.02   nRBC 0 /100 WBC 0 0   Differential Method  Automated Automated   Iron 30 - 160 ug/dL 49    TIBC 250 - 450  ug/dL 459 (H)    Saturated Iron 20 - 50 % 11 (L)    Transferrin 200 - 375 mg/dL 310    Ferritin 20.0 - 300.0 ng/mL 19 (L)    Folate 4.0 - 24.0 ng/mL 5.7    Vitamin B-12 210 - 950 pg/mL 981 (H)    Haptoglobin 30 - 250 mg/dL 196    Pathologist Review Peripheral Smear  REVIEWED    Retic 0.5 - 2.5 % 3.3 (H)    Pathologist Review  Review completed    Protime 9.0 - 12.5 sec  12.5   INR 0.8 - 1.2   1.2   Sodium 136 - 145 mmol/L 141 141   Potassium 3.5 - 5.1 mmol/L 3.9 3.1 (L)   Chloride 95 - 110 mmol/L 102 98   CO2 23 - 29 mmol/L 27 25   Anion Gap 8 - 16 mmol/L 12 18 (H)   BUN 6 - 20 mg/dL 8 11   Creatinine 0.5 - 1.4 mg/dL 0.8 1.0   eGFR >60 mL/min/1.73 m^2 >60.0 >60.0   Glucose 70 - 110 mg/dL 104 99   Calcium 8.7 - 10.5 mg/dL 9.1 9.3   Alkaline Phosphatase 55 - 135 U/L 247 (H) 222 (H)   PROTEIN TOTAL 6.0 - 8.4 g/dL 7.1 7.9   Albumin 3.5 - 5.2 g/dL 2.9 (L) 3.4 (L)   BILIRUBIN TOTAL 0.1 - 1.0 mg/dL 1.4 (H) 1.4 (H)   AST 10 - 40 U/L 45 (H) 34   ALT 10 - 44 U/L 11 10    - 260 U/L 209    Copper 810 - 1990 ug/L 1519    (L): Data is abnormally low  (H): Data is abnormally high    Vitals:    04/21/23 1327   BP: 122/72   Pulse: 64       Physical Exam  Vitals reviewed.   Constitutional:       General: She is not in acute distress.     Appearance: Normal appearance. She is obese. She is not ill-appearing, toxic-appearing or diaphoretic.   HENT:      Head: Normocephalic and atraumatic.   Cardiovascular:      Rate and Rhythm: Normal rate.   Pulmonary:      Effort: Pulmonary effort is normal. No respiratory distress.   Skin:     General: Skin is warm and dry.   Neurological:      Mental Status: She is alert and oriented to person, place, and time.          Assessment and Plan     Problem List Items Addressed This Visit       Type 2 diabetes mellitus with diabetic polyneuropathy, without long-term current use of insulin (Chronic)    Relevant Medications    tirzepatide (MOUNJARO) 2.5 mg/0.5 mL PnIj    tirzepatide (MOUNJARO) 5  mg/0.5 mL PnIj (Start on 5/19/2023)    tirzepatide (MOUNJARO) 7.5 mg/0.5 mL PnIj (Start on 6/16/2023)    NAFLD (nonalcoholic fatty liver disease) (Chronic)    Relevant Medications    tirzepatide (MOUNJARO) 2.5 mg/0.5 mL PnIj    tirzepatide (MOUNJARO) 5 mg/0.5 mL PnIj (Start on 5/19/2023)    tirzepatide (MOUNJARO) 7.5 mg/0.5 mL PnIj (Start on 6/16/2023)    Essential hypertension    ERENDIRA (obstructive sleep apnea)    Pure hypercholesterolemia     Other Visit Diagnoses       Class 3 severe obesity due to excess calories with serious comorbidity and body mass index (BMI) of 50.0 to 59.9 in adult    -  Primary    Relevant Medications    tirzepatide (MOUNJARO) 2.5 mg/0.5 mL PnIj    tirzepatide (MOUNJARO) 5 mg/0.5 mL PnIj (Start on 5/19/2023)    tirzepatide (MOUNJARO) 7.5 mg/0.5 mL PnIj (Start on 6/16/2023)            - Mounjaro weekly injections    - Log all food and beverage intake with a daily calorie goal of 1200 calories per day; bariatric diet plan    - Low intensity aerobic exercise for 15-30 minutes 3-5x/week

## 2023-04-24 ENCOUNTER — PATIENT MESSAGE (OUTPATIENT)
Dept: OBSTETRICS AND GYNECOLOGY | Facility: CLINIC | Age: 58
End: 2023-04-24
Payer: COMMERCIAL

## 2023-04-24 DIAGNOSIS — Z12.31 ENCOUNTER FOR SCREENING MAMMOGRAM FOR MALIGNANT NEOPLASM OF BREAST: Primary | ICD-10-CM

## 2023-04-26 ENCOUNTER — TELEPHONE (OUTPATIENT)
Dept: PHARMACY | Facility: CLINIC | Age: 58
End: 2023-04-26
Payer: COMMERCIAL

## 2023-04-28 ENCOUNTER — INFUSION (OUTPATIENT)
Dept: INFUSION THERAPY | Facility: HOSPITAL | Age: 58
End: 2023-04-28
Payer: COMMERCIAL

## 2023-04-28 VITALS
HEART RATE: 80 BPM | SYSTOLIC BLOOD PRESSURE: 112 MMHG | OXYGEN SATURATION: 96 % | RESPIRATION RATE: 17 BRPM | TEMPERATURE: 98 F | DIASTOLIC BLOOD PRESSURE: 58 MMHG

## 2023-04-28 DIAGNOSIS — D50.0 IRON DEFICIENCY ANEMIA DUE TO CHRONIC BLOOD LOSS: Primary | ICD-10-CM

## 2023-04-28 PROCEDURE — 25000003 PHARM REV CODE 250: Performed by: INTERNAL MEDICINE

## 2023-04-28 PROCEDURE — 96365 THER/PROPH/DIAG IV INF INIT: CPT

## 2023-04-28 PROCEDURE — 63600175 PHARM REV CODE 636 W HCPCS: Mod: JZ,JG | Performed by: INTERNAL MEDICINE

## 2023-04-28 RX ORDER — SODIUM CHLORIDE 0.9 % (FLUSH) 0.9 %
10 SYRINGE (ML) INJECTION
Status: CANCELLED | OUTPATIENT
Start: 2023-04-28

## 2023-04-28 RX ORDER — SODIUM CHLORIDE 9 MG/ML
INJECTION, SOLUTION INTRAVENOUS CONTINUOUS
Status: CANCELLED | OUTPATIENT
Start: 2023-04-28

## 2023-04-28 RX ORDER — HEPARIN 100 UNIT/ML
5 SYRINGE INTRAVENOUS
Status: DISCONTINUED | OUTPATIENT
Start: 2023-04-28 | End: 2023-04-28 | Stop reason: HOSPADM

## 2023-04-28 RX ORDER — SODIUM CHLORIDE 0.9 % (FLUSH) 0.9 %
10 SYRINGE (ML) INJECTION
Status: DISCONTINUED | OUTPATIENT
Start: 2023-04-28 | End: 2023-04-28 | Stop reason: HOSPADM

## 2023-04-28 RX ORDER — DIPHENHYDRAMINE HYDROCHLORIDE 50 MG/ML
50 INJECTION INTRAMUSCULAR; INTRAVENOUS ONCE AS NEEDED
OUTPATIENT
Start: 2023-04-28

## 2023-04-28 RX ORDER — EPINEPHRINE 0.3 MG/.3ML
0.3 INJECTION SUBCUTANEOUS ONCE AS NEEDED
OUTPATIENT
Start: 2023-04-28

## 2023-04-28 RX ORDER — METHYLPREDNISOLONE SOD SUCC 125 MG
125 VIAL (EA) INJECTION ONCE AS NEEDED
OUTPATIENT
Start: 2023-04-28

## 2023-04-28 RX ORDER — HEPARIN 100 UNIT/ML
5 SYRINGE INTRAVENOUS
Status: CANCELLED | OUTPATIENT
Start: 2023-04-28

## 2023-04-28 RX ORDER — SODIUM CHLORIDE 9 MG/ML
INJECTION, SOLUTION INTRAVENOUS CONTINUOUS
Status: DISCONTINUED | OUTPATIENT
Start: 2023-04-28 | End: 2023-04-28 | Stop reason: HOSPADM

## 2023-04-28 RX ADMIN — FERRIC CARBOXYMALTOSE INJECTION 750 MG: 50 INJECTION, SOLUTION INTRAVENOUS at 03:04

## 2023-04-28 RX ADMIN — SODIUM CHLORIDE: 9 INJECTION, SOLUTION INTRAVENOUS at 03:04

## 2023-04-28 NOTE — PLAN OF CARE
1500  Patient seated in chair, VSS, assessment done.  PIV inserted, flushed, blood return noted. Started NS @ 50 cc/hr KVO while waiting for injectafer from pharmacy.  WCTM for safety

## 2023-04-28 NOTE — PLAN OF CARE
0790 Patient completed Injectafer infusion and subsequent flush, tolerated well.  PIV discontinued without issue.  Patient transferred from chair to wheel chair with stand by assist. Patient wheeled to first floor by another RN to meet ride home.

## 2023-05-04 ENCOUNTER — TELEPHONE (OUTPATIENT)
Dept: INTERVENTIONAL RADIOLOGY/VASCULAR | Facility: CLINIC | Age: 58
End: 2023-05-04
Payer: COMMERCIAL

## 2023-05-04 NOTE — TELEPHONE ENCOUNTER
Called and spoke to patient, scheduled patient for IR TJ liver biopsy on 5/22/23 at 830 am. Gave preop instructions, arrival time and location.

## 2023-05-11 ENCOUNTER — PATIENT MESSAGE (OUTPATIENT)
Dept: ELECTROPHYSIOLOGY | Facility: CLINIC | Age: 58
End: 2023-05-11
Payer: COMMERCIAL

## 2023-05-11 DIAGNOSIS — I48.0 PAF (PAROXYSMAL ATRIAL FIBRILLATION): Primary | ICD-10-CM

## 2023-06-07 ENCOUNTER — OFFICE VISIT (OUTPATIENT)
Dept: BARIATRICS | Facility: CLINIC | Age: 58
End: 2023-06-07
Payer: COMMERCIAL

## 2023-06-07 VITALS
DIASTOLIC BLOOD PRESSURE: 63 MMHG | BODY MASS INDEX: 48.45 KG/M2 | OXYGEN SATURATION: 96 % | HEART RATE: 84 BPM | WEIGHT: 273.5 LBS | SYSTOLIC BLOOD PRESSURE: 121 MMHG

## 2023-06-07 DIAGNOSIS — Z71.3 ENCOUNTER FOR WEIGHT LOSS COUNSELING: ICD-10-CM

## 2023-06-07 DIAGNOSIS — E78.00 PURE HYPERCHOLESTEROLEMIA: ICD-10-CM

## 2023-06-07 DIAGNOSIS — K76.0 NAFLD (NONALCOHOLIC FATTY LIVER DISEASE): Chronic | ICD-10-CM

## 2023-06-07 DIAGNOSIS — E11.42 TYPE 2 DIABETES MELLITUS WITH DIABETIC POLYNEUROPATHY, WITHOUT LONG-TERM CURRENT USE OF INSULIN: Chronic | ICD-10-CM

## 2023-06-07 DIAGNOSIS — G47.33 OSA (OBSTRUCTIVE SLEEP APNEA): ICD-10-CM

## 2023-06-07 DIAGNOSIS — E66.01 CLASS 3 SEVERE OBESITY DUE TO EXCESS CALORIES WITH SERIOUS COMORBIDITY AND BODY MASS INDEX (BMI) OF 45.0 TO 49.9 IN ADULT: Primary | ICD-10-CM

## 2023-06-07 DIAGNOSIS — I10 ESSENTIAL HYPERTENSION: ICD-10-CM

## 2023-06-07 PROCEDURE — 3074F PR MOST RECENT SYSTOLIC BLOOD PRESSURE < 130 MM HG: ICD-10-PCS | Mod: CPTII,S$GLB,, | Performed by: STUDENT IN AN ORGANIZED HEALTH CARE EDUCATION/TRAINING PROGRAM

## 2023-06-07 PROCEDURE — 1159F MED LIST DOCD IN RCRD: CPT | Mod: CPTII,S$GLB,, | Performed by: STUDENT IN AN ORGANIZED HEALTH CARE EDUCATION/TRAINING PROGRAM

## 2023-06-07 PROCEDURE — 1160F RVW MEDS BY RX/DR IN RCRD: CPT | Mod: CPTII,S$GLB,, | Performed by: STUDENT IN AN ORGANIZED HEALTH CARE EDUCATION/TRAINING PROGRAM

## 2023-06-07 PROCEDURE — 99999 PR PBB SHADOW E&M-EST. PATIENT-LVL V: ICD-10-PCS | Mod: PBBFAC,,, | Performed by: STUDENT IN AN ORGANIZED HEALTH CARE EDUCATION/TRAINING PROGRAM

## 2023-06-07 PROCEDURE — 99999 PR PBB SHADOW E&M-EST. PATIENT-LVL V: CPT | Mod: PBBFAC,,, | Performed by: STUDENT IN AN ORGANIZED HEALTH CARE EDUCATION/TRAINING PROGRAM

## 2023-06-07 PROCEDURE — 3072F PR LOW RISK FOR RETINOPATHY: ICD-10-PCS | Mod: CPTII,S$GLB,, | Performed by: STUDENT IN AN ORGANIZED HEALTH CARE EDUCATION/TRAINING PROGRAM

## 2023-06-07 PROCEDURE — 3008F PR BODY MASS INDEX (BMI) DOCUMENTED: ICD-10-PCS | Mod: CPTII,S$GLB,, | Performed by: STUDENT IN AN ORGANIZED HEALTH CARE EDUCATION/TRAINING PROGRAM

## 2023-06-07 PROCEDURE — 3078F PR MOST RECENT DIASTOLIC BLOOD PRESSURE < 80 MM HG: ICD-10-PCS | Mod: CPTII,S$GLB,, | Performed by: STUDENT IN AN ORGANIZED HEALTH CARE EDUCATION/TRAINING PROGRAM

## 2023-06-07 PROCEDURE — 3008F BODY MASS INDEX DOCD: CPT | Mod: CPTII,S$GLB,, | Performed by: STUDENT IN AN ORGANIZED HEALTH CARE EDUCATION/TRAINING PROGRAM

## 2023-06-07 PROCEDURE — 3074F SYST BP LT 130 MM HG: CPT | Mod: CPTII,S$GLB,, | Performed by: STUDENT IN AN ORGANIZED HEALTH CARE EDUCATION/TRAINING PROGRAM

## 2023-06-07 PROCEDURE — 3072F LOW RISK FOR RETINOPATHY: CPT | Mod: CPTII,S$GLB,, | Performed by: STUDENT IN AN ORGANIZED HEALTH CARE EDUCATION/TRAINING PROGRAM

## 2023-06-07 PROCEDURE — 4010F PR ACE/ARB THEARPY RXD/TAKEN: ICD-10-PCS | Mod: CPTII,S$GLB,, | Performed by: STUDENT IN AN ORGANIZED HEALTH CARE EDUCATION/TRAINING PROGRAM

## 2023-06-07 PROCEDURE — 1160F PR REVIEW ALL MEDS BY PRESCRIBER/CLIN PHARMACIST DOCUMENTED: ICD-10-PCS | Mod: CPTII,S$GLB,, | Performed by: STUDENT IN AN ORGANIZED HEALTH CARE EDUCATION/TRAINING PROGRAM

## 2023-06-07 PROCEDURE — 1159F PR MEDICATION LIST DOCUMENTED IN MEDICAL RECORD: ICD-10-PCS | Mod: CPTII,S$GLB,, | Performed by: STUDENT IN AN ORGANIZED HEALTH CARE EDUCATION/TRAINING PROGRAM

## 2023-06-07 PROCEDURE — 4010F ACE/ARB THERAPY RXD/TAKEN: CPT | Mod: CPTII,S$GLB,, | Performed by: STUDENT IN AN ORGANIZED HEALTH CARE EDUCATION/TRAINING PROGRAM

## 2023-06-07 PROCEDURE — 99213 PR OFFICE/OUTPT VISIT, EST, LEVL III, 20-29 MIN: ICD-10-PCS | Mod: S$GLB,,, | Performed by: STUDENT IN AN ORGANIZED HEALTH CARE EDUCATION/TRAINING PROGRAM

## 2023-06-07 PROCEDURE — 3078F DIAST BP <80 MM HG: CPT | Mod: CPTII,S$GLB,, | Performed by: STUDENT IN AN ORGANIZED HEALTH CARE EDUCATION/TRAINING PROGRAM

## 2023-06-07 PROCEDURE — 99213 OFFICE O/P EST LOW 20 MIN: CPT | Mod: S$GLB,,, | Performed by: STUDENT IN AN ORGANIZED HEALTH CARE EDUCATION/TRAINING PROGRAM

## 2023-06-07 NOTE — PROGRESS NOTES
Subjective     Patient ID: Vicky Alvarado is a 58 y.o. female.    Chief Complaint: Follow-up, Obesity, and Weight Check    Patient presents for treatment of obesity.     Co-morbidities  HTN  HLD  DM2  ERENDIRA  GERD  NAFLD    Negative for thyroid cancer    H/o in hysterectomy    Current Physical Activity  No regular exercise routine    Current Eating Habits  Breakfast - protein shake  Lunch - skips  Dinner - sister cooks dinner; avoiding fried foods and fast food  Snacks  Beverages - water, SF gatorade    Medical Weight Loss  4/21/2023: 286.4 lbs, BMI 50.7, BFP 54.2%, .2 lbs, SMM 71.7 lbs, BMR 1654 kcal  6/7/2023: 273.8 lbs, BMI 48.5, BFP 55%, .6 lbs, SMM 65.9 lbs, BMR 1578 kcal    Review of Systems   Constitutional:  Negative for chills and fever.   Respiratory:  Negative for shortness of breath.    Cardiovascular:  Negative for chest pain and palpitations.   Gastrointestinal:  Negative for abdominal pain, nausea and vomiting.   Neurological:  Negative for dizziness and light-headedness.   Psychiatric/Behavioral:  The patient is not nervous/anxious.         Objective    Latest Reference Range & Units 03/31/23 16:14 04/18/23 15:44   WBC 3.90 - 12.70 K/uL 7.13 7.35   RBC 4.00 - 5.40 M/uL 3.65 (L) 4.10   Hemoglobin 12.0 - 16.0 g/dL 8.3 (L) 9.6 (L)   Hematocrit 37.0 - 48.5 % 30.8 (L) 33.3 (L)   MCV 82 - 98 fL 84 81 (L)   MCH 27.0 - 31.0 pg 22.7 (L) 23.4 (L)   MCHC 32.0 - 36.0 g/dL 26.9 (L) 28.8 (L)   RDW 11.5 - 14.5 % 20.8 (H) 19.5 (H)   Platelets 150 - 450 K/uL 166 254   MPV 9.2 - 12.9 fL 11.4 11.0   Gran % 38.0 - 73.0 % 76.0 (H) 80.7 (H)   Lymph % 18.0 - 48.0 % 13.3 (L) 11.6 (L)   Mono % 4.0 - 15.0 % 7.2 4.9   Eosinophil % 0.0 - 8.0 % 2.1 1.4   Basophil % 0.0 - 1.9 % 1.0 1.1   Immature Granulocytes 0.0 - 0.5 % 0.4 0.3   Gran # (ANC) 1.8 - 7.7 K/uL 5.4 5.9   Lymph # 1.0 - 4.8 K/uL 1.0 0.9 (L)   Mono # 0.3 - 1.0 K/uL 0.5 0.4   Eos # 0.0 - 0.5 K/uL 0.2 0.1   Baso # 0.00 - 0.20 K/uL 0.07 0.08   Immature  Grans (Abs) 0.00 - 0.04 K/uL 0.03 0.02   nRBC 0 /100 WBC 0 0   Differential Method  Automated Automated   Iron 30 - 160 ug/dL 49    TIBC 250 - 450 ug/dL 459 (H)    Saturated Iron 20 - 50 % 11 (L)    Transferrin 200 - 375 mg/dL 310    Ferritin 20.0 - 300.0 ng/mL 19 (L)    Folate 4.0 - 24.0 ng/mL 5.7    Vitamin B-12 210 - 950 pg/mL 981 (H)    Haptoglobin 30 - 250 mg/dL 196    Pathologist Review Peripheral Smear  REVIEWED    Retic 0.5 - 2.5 % 3.3 (H)    Pathologist Review  Review completed    Protime 9.0 - 12.5 sec  12.5   INR 0.8 - 1.2   1.2   Sodium 136 - 145 mmol/L 141 141   Potassium 3.5 - 5.1 mmol/L 3.9 3.1 (L)   Chloride 95 - 110 mmol/L 102 98   CO2 23 - 29 mmol/L 27 25   Anion Gap 8 - 16 mmol/L 12 18 (H)   BUN 6 - 20 mg/dL 8 11   Creatinine 0.5 - 1.4 mg/dL 0.8 1.0   eGFR >60 mL/min/1.73 m^2 >60.0 >60.0   Glucose 70 - 110 mg/dL 104 99   Calcium 8.7 - 10.5 mg/dL 9.1 9.3   Alkaline Phosphatase 55 - 135 U/L 247 (H) 222 (H)   PROTEIN TOTAL 6.0 - 8.4 g/dL 7.1 7.9   Albumin 3.5 - 5.2 g/dL 2.9 (L) 3.4 (L)   BILIRUBIN TOTAL 0.1 - 1.0 mg/dL 1.4 (H) 1.4 (H)   AST 10 - 40 U/L 45 (H) 34   ALT 10 - 44 U/L 11 10    - 260 U/L 209    Copper 810 - 1990 ug/L 1519    (L): Data is abnormally low  (H): Data is abnormally high    Vitals:    06/07/23 1439   BP: 121/63   Pulse: 84       Physical Exam  Vitals reviewed.   Constitutional:       General: She is not in acute distress.     Appearance: Normal appearance. She is obese. She is not ill-appearing, toxic-appearing or diaphoretic.   HENT:      Head: Normocephalic and atraumatic.   Cardiovascular:      Rate and Rhythm: Normal rate.   Pulmonary:      Effort: Pulmonary effort is normal. No respiratory distress.   Skin:     General: Skin is warm and dry.   Neurological:      Mental Status: She is alert and oriented to person, place, and time.          Assessment and Plan     Problem List Items Addressed This Visit       Type 2 diabetes mellitus with diabetic polyneuropathy,  without long-term current use of insulin (Chronic)    NAFLD (nonalcoholic fatty liver disease) (Chronic)    Essential hypertension    ERENDIRA (obstructive sleep apnea)    Pure hypercholesterolemia     Other Visit Diagnoses       Class 3 severe obesity due to excess calories with serious comorbidity and body mass index (BMI) of 45.0 to 49.9 in adult    -  Primary    Encounter for weight loss counseling              - Mounjaro weekly injections    - Log all food and beverage intake with a daily calorie goal of 1200 calories per day; bariatric diet plan    - Low intensity aerobic exercise for 15-30 minutes 3-5x/week

## 2023-06-08 ENCOUNTER — PATIENT MESSAGE (OUTPATIENT)
Dept: MEDSURG UNIT | Facility: HOSPITAL | Age: 58
End: 2023-06-08
Payer: COMMERCIAL

## 2023-06-09 ENCOUNTER — ANESTHESIA EVENT (OUTPATIENT)
Dept: MEDSURG UNIT | Facility: HOSPITAL | Age: 58
DRG: 274 | End: 2023-06-09
Payer: COMMERCIAL

## 2023-06-09 ENCOUNTER — PATIENT MESSAGE (OUTPATIENT)
Dept: MEDSURG UNIT | Facility: HOSPITAL | Age: 58
End: 2023-06-09
Payer: COMMERCIAL

## 2023-06-09 ENCOUNTER — TELEPHONE (OUTPATIENT)
Dept: ELECTROPHYSIOLOGY | Facility: CLINIC | Age: 58
End: 2023-06-09
Payer: COMMERCIAL

## 2023-06-09 ENCOUNTER — LAB VISIT (OUTPATIENT)
Dept: LAB | Facility: HOSPITAL | Age: 58
End: 2023-06-09
Attending: INTERNAL MEDICINE
Payer: COMMERCIAL

## 2023-06-09 DIAGNOSIS — I48.11 LONGSTANDING PERSISTENT ATRIAL FIBRILLATION: ICD-10-CM

## 2023-06-09 LAB
ANION GAP SERPL CALC-SCNC: 15 MMOL/L (ref 8–16)
BUN SERPL-MCNC: 14 MG/DL (ref 6–20)
CALCIUM SERPL-MCNC: 9.5 MG/DL (ref 8.7–10.5)
CHLORIDE SERPL-SCNC: 95 MMOL/L (ref 95–110)
CO2 SERPL-SCNC: 27 MMOL/L (ref 23–29)
CREAT SERPL-MCNC: 0.8 MG/DL (ref 0.5–1.4)
ERYTHROCYTE [DISTWIDTH] IN BLOOD BY AUTOMATED COUNT: 20.8 % (ref 11.5–14.5)
EST. GFR  (NO RACE VARIABLE): >60 ML/MIN/1.73 M^2
GLUCOSE SERPL-MCNC: 122 MG/DL (ref 70–110)
HCT VFR BLD AUTO: 41.3 % (ref 37–48.5)
HGB BLD-MCNC: 12.9 G/DL (ref 12–16)
MCH RBC QN AUTO: 30.1 PG (ref 27–31)
MCHC RBC AUTO-ENTMCNC: 31.2 G/DL (ref 32–36)
MCV RBC AUTO: 97 FL (ref 82–98)
PLATELET # BLD AUTO: 253 K/UL (ref 150–450)
PMV BLD AUTO: 11 FL (ref 9.2–12.9)
POTASSIUM SERPL-SCNC: 4 MMOL/L (ref 3.5–5.1)
RBC # BLD AUTO: 4.28 M/UL (ref 4–5.4)
SODIUM SERPL-SCNC: 137 MMOL/L (ref 136–145)
WBC # BLD AUTO: 8.11 K/UL (ref 3.9–12.7)

## 2023-06-09 PROCEDURE — 85027 COMPLETE CBC AUTOMATED: CPT | Performed by: INTERNAL MEDICINE

## 2023-06-09 PROCEDURE — 80048 BASIC METABOLIC PNL TOTAL CA: CPT | Performed by: INTERNAL MEDICINE

## 2023-06-09 PROCEDURE — 85730 THROMBOPLASTIN TIME PARTIAL: CPT | Performed by: INTERNAL MEDICINE

## 2023-06-09 PROCEDURE — 36415 COLL VENOUS BLD VENIPUNCTURE: CPT | Mod: PO | Performed by: INTERNAL MEDICINE

## 2023-06-09 PROCEDURE — 85610 PROTHROMBIN TIME: CPT | Performed by: INTERNAL MEDICINE

## 2023-06-09 NOTE — ANESTHESIA PREPROCEDURE EVALUATION
06/09/2023  Vicky Alvarado is a 58 y.o., female.  Patient Active Problem List   Diagnosis    Opiate dependence    Opioid dependence in remission    Essential hypertension    Atrial Fibrillation (paroxysmal)    Avascular necrosis of lunate    History of opioid abuse    Morbid obesity with BMI of 45.0-49.9, adult    Type 2 diabetes mellitus with diabetic polyneuropathy, without long-term current use of insulin    Right carpal tunnel syndrome    Weakness of right hand    ERENDIRA (obstructive sleep apnea)    Depression    Carpal tunnel syndrome    Ulnar neuropathy at elbow of left upper extremity    Range of motion deficit    Edema of hand    Pure hypercholesterolemia    Mild aortic stenosis    Microcytic anemia    Colon cancer screening    Vaginal bleeding    Gastroesophageal reflux disease without esophagitis    Recurrent major depressive disorder, in partial remission    Thrombocytopenia    Acute post-hemorrhagic anemia    Pre-diabetes    Iron deficiency anemia    Cellulitis of extremity    Left leg cellulitis    Volume overload    s/p RA-TLH/BSO    Laryngeal edema    Longstanding persistent atrial fibrillation    Abnormal nuclear stress test    Elevated liver enzymes    NAFLD (nonalcoholic fatty liver disease)           Pre-op Assessment          Review of Systems      Physical Exam    Airway:  No airway management difficulties anticipated  Dental:No active dental issues noted  Chest/Lungs:  Clear to auscultation    Heart:  Rate: Normal  Rhythm: Regular Rhythm  Sounds: Normal        Anesthesia Plan  Type of Anesthesia, risks & benefits discussed:    Anesthesia Type: Gen ETT  Intra-op Monitoring Plan: Art Line  Informed Consent: Informed consent signed with the Patient and all parties understand the risks and agree with anesthesia plan.  All questions answered.   ASA  Score: 3  Anesthesia Plan Notes: Chart reviewed. Patient seen and examined. Anesthesia plan discussed and questions answered. E-consent signed. Jose Tobias MD    Ready For Surgery From Anesthesia Perspective.     .

## 2023-06-09 NOTE — TELEPHONE ENCOUNTER
Attempted to contact patient to confirm procedure details. No answer. Left detailed voicemail including: arrival time, NPO after midnight and medication instructions, along with callback number.     Labs- patient did not go to pre-op lab appt and did not show up to appt rescheduled to yesterday.

## 2023-06-12 ENCOUNTER — HOSPITAL ENCOUNTER (INPATIENT)
Facility: HOSPITAL | Age: 58
LOS: 1 days | Discharge: HOME | DRG: 274 | End: 2023-06-12
Attending: INTERNAL MEDICINE | Admitting: INTERNAL MEDICINE
Payer: COMMERCIAL

## 2023-06-12 ENCOUNTER — ANESTHESIA (OUTPATIENT)
Dept: MEDSURG UNIT | Facility: HOSPITAL | Age: 58
DRG: 274 | End: 2023-06-12
Payer: COMMERCIAL

## 2023-06-12 VITALS
HEART RATE: 68 BPM | SYSTOLIC BLOOD PRESSURE: 127 MMHG | HEIGHT: 63 IN | TEMPERATURE: 97 F | BODY MASS INDEX: 48.37 KG/M2 | WEIGHT: 273 LBS | DIASTOLIC BLOOD PRESSURE: 67 MMHG | RESPIRATION RATE: 18 BRPM | OXYGEN SATURATION: 98 %

## 2023-06-12 DIAGNOSIS — Z95.818 PRESENCE OF WATCHMAN LEFT ATRIAL APPENDAGE CLOSURE DEVICE: ICD-10-CM

## 2023-06-12 DIAGNOSIS — I49.9 ARRHYTHMIA: ICD-10-CM

## 2023-06-12 DIAGNOSIS — I48.11 LONGSTANDING PERSISTENT ATRIAL FIBRILLATION: ICD-10-CM

## 2023-06-12 DIAGNOSIS — I48.91 ATRIAL FIBRILLATION: ICD-10-CM

## 2023-06-12 LAB
ABO + RH BLD: NORMAL
APTT PPP: 29.2 SEC (ref 21–32)
BLD GP AB SCN CELLS X3 SERPL QL: NORMAL
BLD PROD TYP BPU: NORMAL
BLD PROD TYP BPU: NORMAL
BLOOD UNIT EXPIRATION DATE: NORMAL
BLOOD UNIT EXPIRATION DATE: NORMAL
BLOOD UNIT TYPE CODE: 9500
BLOOD UNIT TYPE CODE: 9500
BLOOD UNIT TYPE: NORMAL
BLOOD UNIT TYPE: NORMAL
CODING SYSTEM: NORMAL
CODING SYSTEM: NORMAL
CROSSMATCH INTERPRETATION: NORMAL
CROSSMATCH INTERPRETATION: NORMAL
DISPENSE STATUS: NORMAL
DISPENSE STATUS: NORMAL
INR PPP: 1.1 (ref 0.8–1.2)
INR PPP: 1.1 (ref 0.8–1.2)
NUM UNITS TRANS PACKED RBC: NORMAL
NUM UNITS TRANS PACKED RBC: NORMAL
POC ACTIVATED CLOTTING TIME K: 299 SEC (ref 74–137)
POC ACTIVATED CLOTTING TIME K: 360 SEC (ref 74–137)
POCT GLUCOSE: 102 MG/DL (ref 70–110)
POCT GLUCOSE: 108 MG/DL (ref 70–110)
POCT GLUCOSE: 94 MG/DL (ref 70–110)
PROTHROMBIN TIME: 11.7 SEC (ref 9–12.5)
PROTHROMBIN TIME: 12 SEC (ref 9–12.5)
SAMPLE: ABNORMAL
SAMPLE: ABNORMAL
SPECIMEN OUTDATE: NORMAL

## 2023-06-12 PROCEDURE — 93010 EKG 12-LEAD: ICD-10-PCS | Mod: ,,, | Performed by: INTERNAL MEDICINE

## 2023-06-12 PROCEDURE — 37000009 HC ANESTHESIA EA ADD 15 MINS: Performed by: INTERNAL MEDICINE

## 2023-06-12 PROCEDURE — C1894 INTRO/SHEATH, NON-LASER: HCPCS | Performed by: INTERNAL MEDICINE

## 2023-06-12 PROCEDURE — 86900 BLOOD TYPING SEROLOGIC ABO: CPT | Performed by: INTERNAL MEDICINE

## 2023-06-12 PROCEDURE — D9220A PRA ANESTHESIA: Mod: Q0,CRNA,, | Performed by: NURSE ANESTHETIST, CERTIFIED REGISTERED

## 2023-06-12 PROCEDURE — 33340 PERQ CLSR TCAT L ATR APNDGE: CPT | Performed by: INTERNAL MEDICINE

## 2023-06-12 PROCEDURE — 93010 ELECTROCARDIOGRAM REPORT: CPT | Mod: ,,, | Performed by: INTERNAL MEDICINE

## 2023-06-12 PROCEDURE — 27201423 OPTIME MED/SURG SUP & DEVICES STERILE SUPPLY: Performed by: INTERNAL MEDICINE

## 2023-06-12 PROCEDURE — D9220A PRA ANESTHESIA: ICD-10-PCS | Mod: Q0,CRNA,, | Performed by: NURSE ANESTHETIST, CERTIFIED REGISTERED

## 2023-06-12 PROCEDURE — 82962 GLUCOSE BLOOD TEST: CPT | Performed by: INTERNAL MEDICINE

## 2023-06-12 PROCEDURE — 86920 COMPATIBILITY TEST SPIN: CPT | Performed by: NURSE PRACTITIONER

## 2023-06-12 PROCEDURE — 25000003 PHARM REV CODE 250: Performed by: NURSE ANESTHETIST, CERTIFIED REGISTERED

## 2023-06-12 PROCEDURE — 25500020 PHARM REV CODE 255: Performed by: INTERNAL MEDICINE

## 2023-06-12 PROCEDURE — D9220A PRA ANESTHESIA: ICD-10-PCS | Mod: Q0,ANES,, | Performed by: ANESTHESIOLOGY

## 2023-06-12 PROCEDURE — 85610 PROTHROMBIN TIME: CPT | Performed by: INTERNAL MEDICINE

## 2023-06-12 PROCEDURE — 20600001 HC STEP DOWN PRIVATE ROOM

## 2023-06-12 PROCEDURE — 93005 ELECTROCARDIOGRAM TRACING: CPT

## 2023-06-12 PROCEDURE — D9220A PRA ANESTHESIA: Mod: Q0,ANES,, | Performed by: ANESTHESIOLOGY

## 2023-06-12 PROCEDURE — 63600175 PHARM REV CODE 636 W HCPCS: Performed by: NURSE ANESTHETIST, CERTIFIED REGISTERED

## 2023-06-12 PROCEDURE — 25000003 PHARM REV CODE 250: Performed by: INTERNAL MEDICINE

## 2023-06-12 PROCEDURE — C1889 IMPLANT/INSERT DEVICE, NOC: HCPCS | Performed by: INTERNAL MEDICINE

## 2023-06-12 PROCEDURE — 63600175 PHARM REV CODE 636 W HCPCS: Performed by: ANESTHESIOLOGY

## 2023-06-12 PROCEDURE — 33340 PR TRANSCATH CLOSURE, PERC, LEFT ATRIAL APPENDAGE, W/IMPLANT: ICD-10-PCS | Mod: Q0,GC,, | Performed by: INTERNAL MEDICINE

## 2023-06-12 PROCEDURE — 25000003 PHARM REV CODE 250: Performed by: STUDENT IN AN ORGANIZED HEALTH CARE EDUCATION/TRAINING PROGRAM

## 2023-06-12 PROCEDURE — 37000008 HC ANESTHESIA 1ST 15 MINUTES: Performed by: INTERNAL MEDICINE

## 2023-06-12 PROCEDURE — 33340 PERQ CLSR TCAT L ATR APNDGE: CPT | Mod: Q0,GC,, | Performed by: INTERNAL MEDICINE

## 2023-06-12 PROCEDURE — C1769 GUIDE WIRE: HCPCS | Performed by: INTERNAL MEDICINE

## 2023-06-12 PROCEDURE — 63600175 PHARM REV CODE 636 W HCPCS: Performed by: INTERNAL MEDICINE

## 2023-06-12 DEVICE — LEFT ATRIAL APPENDAGE CLOSURE DEVICE WITH DELIVERY SYSTEM
Type: IMPLANTABLE DEVICE | Site: HEART | Status: FUNCTIONAL
Brand: WATCHMAN FLX™

## 2023-06-12 RX ORDER — ACETAMINOPHEN 325 MG/1
650 TABLET ORAL EVERY 4 HOURS PRN
Status: DISCONTINUED | OUTPATIENT
Start: 2023-06-12 | End: 2023-06-12 | Stop reason: HOSPADM

## 2023-06-12 RX ORDER — ONDANSETRON 2 MG/ML
4 INJECTION INTRAMUSCULAR; INTRAVENOUS ONCE AS NEEDED
Status: DISCONTINUED | OUTPATIENT
Start: 2023-06-12 | End: 2023-06-12 | Stop reason: HOSPADM

## 2023-06-12 RX ORDER — FENTANYL CITRATE 50 UG/ML
25 INJECTION, SOLUTION INTRAMUSCULAR; INTRAVENOUS EVERY 5 MIN PRN
Status: DISCONTINUED | OUTPATIENT
Start: 2023-06-12 | End: 2023-06-12 | Stop reason: HOSPADM

## 2023-06-12 RX ORDER — MIDAZOLAM HYDROCHLORIDE 1 MG/ML
INJECTION, SOLUTION INTRAMUSCULAR; INTRAVENOUS
Status: DISCONTINUED | OUTPATIENT
Start: 2023-06-12 | End: 2023-06-12

## 2023-06-12 RX ORDER — IODIXANOL 320 MG/ML
INJECTION, SOLUTION INTRAVASCULAR
Status: DISCONTINUED | OUTPATIENT
Start: 2023-06-12 | End: 2023-06-12 | Stop reason: HOSPADM

## 2023-06-12 RX ORDER — HYDROCODONE BITARTRATE AND ACETAMINOPHEN 500; 5 MG/1; MG/1
TABLET ORAL
Status: DISCONTINUED | OUTPATIENT
Start: 2023-06-12 | End: 2023-06-12 | Stop reason: HOSPADM

## 2023-06-12 RX ORDER — FENTANYL CITRATE 50 UG/ML
INJECTION, SOLUTION INTRAMUSCULAR; INTRAVENOUS
Status: DISCONTINUED | OUTPATIENT
Start: 2023-06-12 | End: 2023-06-12

## 2023-06-12 RX ORDER — LIDOCAINE HYDROCHLORIDE 20 MG/ML
INJECTION, SOLUTION INFILTRATION; PERINEURAL
Status: DISCONTINUED | OUTPATIENT
Start: 2023-06-12 | End: 2023-06-12 | Stop reason: HOSPADM

## 2023-06-12 RX ORDER — ONDANSETRON 2 MG/ML
INJECTION INTRAMUSCULAR; INTRAVENOUS
Status: DISCONTINUED | OUTPATIENT
Start: 2023-06-12 | End: 2023-06-12

## 2023-06-12 RX ORDER — PROPOFOL 10 MG/ML
VIAL (ML) INTRAVENOUS
Status: DISCONTINUED | OUTPATIENT
Start: 2023-06-12 | End: 2023-06-12

## 2023-06-12 RX ORDER — DIPHENHYDRAMINE HYDROCHLORIDE 50 MG/ML
25 INJECTION INTRAMUSCULAR; INTRAVENOUS EVERY 6 HOURS PRN
Status: DISCONTINUED | OUTPATIENT
Start: 2023-06-12 | End: 2023-06-12 | Stop reason: HOSPADM

## 2023-06-12 RX ORDER — ROCURONIUM BROMIDE 10 MG/ML
INJECTION, SOLUTION INTRAVENOUS
Status: DISCONTINUED | OUTPATIENT
Start: 2023-06-12 | End: 2023-06-12

## 2023-06-12 RX ORDER — HYDROMORPHONE HYDROCHLORIDE 1 MG/ML
0.2 INJECTION, SOLUTION INTRAMUSCULAR; INTRAVENOUS; SUBCUTANEOUS EVERY 5 MIN PRN
Status: DISCONTINUED | OUTPATIENT
Start: 2023-06-12 | End: 2023-06-12 | Stop reason: HOSPADM

## 2023-06-12 RX ORDER — PHENYLEPHRINE HYDROCHLORIDE 10 MG/ML
INJECTION INTRAVENOUS
Status: DISCONTINUED | OUTPATIENT
Start: 2023-06-12 | End: 2023-06-12

## 2023-06-12 RX ORDER — LIDOCAINE HYDROCHLORIDE 20 MG/ML
INJECTION, SOLUTION EPIDURAL; INFILTRATION; INTRACAUDAL; PERINEURAL
Status: DISCONTINUED | OUTPATIENT
Start: 2023-06-12 | End: 2023-06-12

## 2023-06-12 RX ORDER — DEXAMETHASONE SODIUM PHOSPHATE 4 MG/ML
INJECTION, SOLUTION INTRA-ARTICULAR; INTRALESIONAL; INTRAMUSCULAR; INTRAVENOUS; SOFT TISSUE
Status: DISCONTINUED | OUTPATIENT
Start: 2023-06-12 | End: 2023-06-12

## 2023-06-12 RX ORDER — DEXMEDETOMIDINE HYDROCHLORIDE 100 UG/ML
INJECTION, SOLUTION INTRAVENOUS
Status: DISCONTINUED | OUTPATIENT
Start: 2023-06-12 | End: 2023-06-12

## 2023-06-12 RX ORDER — CEFAZOLIN SODIUM 1 G/3ML
INJECTION, POWDER, FOR SOLUTION INTRAMUSCULAR; INTRAVENOUS
Status: DISCONTINUED | OUTPATIENT
Start: 2023-06-12 | End: 2023-06-12

## 2023-06-12 RX ORDER — HEPARIN SODIUM 1000 [USP'U]/ML
INJECTION, SOLUTION INTRAVENOUS; SUBCUTANEOUS
Status: DISCONTINUED | OUTPATIENT
Start: 2023-06-12 | End: 2023-06-12

## 2023-06-12 RX ORDER — PROTAMINE SULFATE 10 MG/ML
INJECTION, SOLUTION INTRAVENOUS
Status: DISCONTINUED | OUTPATIENT
Start: 2023-06-12 | End: 2023-06-12

## 2023-06-12 RX ORDER — HEPARIN SOD,PORCINE/0.9 % NACL 1000/500ML
INTRAVENOUS SOLUTION INTRAVENOUS
Status: DISCONTINUED | OUTPATIENT
Start: 2023-06-12 | End: 2023-06-12 | Stop reason: HOSPADM

## 2023-06-12 RX ADMIN — MIDAZOLAM HYDROCHLORIDE 4 MG: 1 INJECTION, SOLUTION INTRAMUSCULAR; INTRAVENOUS at 07:06

## 2023-06-12 RX ADMIN — ROCURONIUM BROMIDE 100 MG: 10 INJECTION INTRAVENOUS at 07:06

## 2023-06-12 RX ADMIN — PROPOFOL 100 MG: 10 INJECTION, EMULSION INTRAVENOUS at 07:06

## 2023-06-12 RX ADMIN — DEXAMETHASONE SODIUM PHOSPHATE 4 MG: 4 INJECTION, SOLUTION INTRAMUSCULAR; INTRAVENOUS at 07:06

## 2023-06-12 RX ADMIN — HEPARIN SODIUM 12400 UNITS: 1000 INJECTION, SOLUTION INTRAVENOUS; SUBCUTANEOUS at 08:06

## 2023-06-12 RX ADMIN — ONDANSETRON 4 MG: 2 INJECTION INTRAMUSCULAR; INTRAVENOUS at 07:06

## 2023-06-12 RX ADMIN — LIDOCAINE HYDROCHLORIDE 80 MG: 20 INJECTION, SOLUTION EPIDURAL; INFILTRATION; INTRACAUDAL; PERINEURAL at 07:06

## 2023-06-12 RX ADMIN — HYDROMORPHONE HYDROCHLORIDE 0.2 MG: 1 INJECTION, SOLUTION INTRAMUSCULAR; INTRAVENOUS; SUBCUTANEOUS at 09:06

## 2023-06-12 RX ADMIN — HYDROMORPHONE HYDROCHLORIDE 0.2 MG: 1 INJECTION, SOLUTION INTRAMUSCULAR; INTRAVENOUS; SUBCUTANEOUS at 10:06

## 2023-06-12 RX ADMIN — DEXMEDETOMIDINE HYDROCHLORIDE 12 MCG: 100 INJECTION, SOLUTION INTRAVENOUS at 09:06

## 2023-06-12 RX ADMIN — SODIUM CHLORIDE: 9 INJECTION, SOLUTION INTRAVENOUS at 07:06

## 2023-06-12 RX ADMIN — CEFAZOLIN 2 G: 330 INJECTION, POWDER, FOR SOLUTION INTRAMUSCULAR; INTRAVENOUS at 07:06

## 2023-06-12 RX ADMIN — FENTANYL CITRATE 50 MCG: 50 INJECTION, SOLUTION INTRAMUSCULAR; INTRAVENOUS at 07:06

## 2023-06-12 RX ADMIN — PHENYLEPHRINE HYDROCHLORIDE 100 MCG: 10 INJECTION INTRAVENOUS at 08:06

## 2023-06-12 RX ADMIN — ACETAMINOPHEN 650 MG: 325 TABLET ORAL at 09:06

## 2023-06-12 RX ADMIN — PHENYLEPHRINE HYDROCHLORIDE 200 MCG: 10 INJECTION INTRAVENOUS at 07:06

## 2023-06-12 RX ADMIN — PROTAMINE SULFATE 120 MG: 10 INJECTION, SOLUTION INTRAVENOUS at 08:06

## 2023-06-12 RX ADMIN — FENTANYL CITRATE 25 MCG: 50 INJECTION, SOLUTION INTRAMUSCULAR; INTRAVENOUS at 09:06

## 2023-06-12 NOTE — DISCHARGE INSTRUCTIONS
Make sure to continuing taking your blood thinner (apixiban) after your procedure as you would normally take it  Take Aspirin 81 mg daily  You will have an ultrasound of your heart and Watchman device in approximately 6 weeks. Further recommendations will be made at that time depending on the findings  If oozing from groin site occurs, apply pressure without letting up for 15 minutes and lay flat for 1 hour. If bleeding has resolved, you can continue to monitor. If the bleeding continues or there is significant swelling or pain in the groin area, please visit the nearest ER for evaluation and treatment. DO NOT STOP TAKING YOUR BLOOD THINNER UNLESS INSTRUCTED BY A PHYSICIAN.   Do not take baths or submerge your groin area or at least 1 week or when the puncture site(s) in your groin have completely healed  Do not lift anything over 10 lbs for the first week after your procedure, and avoid strenuous activity during this time to allow for the groin sites to heal. After 1 week, you have no activity restrictions  Please contact the electrophysiology clinic or go to the ER if you experience: chest pain, shortness of breath, bleeding or swelling of the groin sites, or any other concerns.

## 2023-06-12 NOTE — Clinical Note
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  Discussed with patient this is a type of scarring alopecia most commonly seen in  women. It most frequently develops on the crown or vertex of the scalp and slowly progresses symmetrically to form a large oval area of hair loss. Caustic hair care products (for example hair relaxer) or use of hot jacobs/curling irons can aggravate or hasten its progression. Treatment is often difficult and unsatisfactory.  Discontinuation of all chemical and heat processing is highly recommended, but some patients progress despite discontinuation. Others do not have a history of use of chemicals or heat.    Discussed potential treatment options including topical or IL kenalog or minoxidil.   Decision made to start minoxidil 5%     She has not tried the minoxidil 5% yet, recommended that she start there.  She said that she is just going to wait and wanted to schedule an appointment instead.  Appointment scheduled 11/6/18 at 1:30 pm.   The right groin was prepped. The site was prepped with ChloraPrep. The site was clipped. The patient was draped. The patient was positioned supine. The patient was secured using safety straps, with ulnar pads and to an armboard.

## 2023-06-12 NOTE — ANESTHESIA PROCEDURE NOTES
Intubation    Date/Time: 6/12/2023 7:28 AM  Performed by: Apryl Tidwell CRNA  Authorized by: Jose Tobias MD     Intubation:     Induction:  Intravenous    Intubated:  Postinduction    Mask Ventilation:  Easy mask    Attempts:  1    Attempted By:  CRNA    Method of Intubation:  Video laryngoscopy    Blade:  Mcnair 3    Laryngeal View Grade: Grade I - full view of cords      Difficult Airway Encountered?: No      Complications:  None    Airway Device:  Oral endotracheal tube    Airway Device Size:  7.0    Style/Cuff Inflation:  Cuffed (inflated to minimal occlusive pressure)    Tube secured:  22    Placement Verified By:  Capnometry    Complicating Factors:  None    Findings Post-Intubation:  BS equal bilateral

## 2023-06-12 NOTE — H&P
[facility] - Short Stay Cardiac Unit  Cardiac Electrophysiology  History and Physical     Admission Date: 6/12/2023  Code Status: Prior   Attending Provider: Gabriel Hawley MD   Principal Problem:<principal problem not specified>    Subjective:     Chief Complaint:  AF, prior  bleed    HPI:  57 yoF here for LAAO implantation. She has a history of AF managed with RFA x , last one being 4/22. Regarding her AF, She feels that the AF was compromising her QOL She has also developed  bleeding and is due for hysterectomy later this spring. Symptoms much improved. She has no sustained arrhythmias since her last ablation. She has developed fatigue and Fe deficient anemia. I offered LAAO due to elevated bleeding and stroke risks. The patient can tolerate short term OAC but is not a candidate for long term OAC due to recurrent bleeding issues.     ECG: NSR, RBBB    Past Medical History:   Diagnosis Date    Anticoagulant long-term use     Arthritis     Atrial fibrillation     cardioversion    Atrial fibrillation with RVR     Avascular necrosis     L hand    CHF (congestive heart failure)     Depression     Encounter for blood transfusion 07/22/2020    Encounter for blood transfusion 03/2022    GERD (gastroesophageal reflux disease)     Hx of psychiatric care     Hypertension     Iron deficiency anemia 5/7/2022    TIBC 444 with saturated iron 8    Obese     Pre-diabetes 5/7/2022    Psychiatric problem     Sleep apnea     wears cpap       Past Surgical History:   Procedure Laterality Date    ABLATION OF ARRHYTHMOGENIC FOCUS FOR ATRIAL FIBRILLATION N/A 7/20/2020    Procedure: Ablation atrial fibrillation;  Surgeon: Gabriel Hawley MD;  Location: [facility] EP LAB;  Service: Cardiology;  Laterality: N/A;  afib, PVI, RFA, REENA, SHADY, anes, MB, 3 Prep    ABLATION OF ARRHYTHMOGENIC FOCUS FOR ATRIAL FIBRILLATION N/A 1/24/2022    Procedure: Ablation atrial fibrillation;  Surgeon: Gabriel Hawley MD;  Location: [facility] EP LAB;   Service: Cardiology;  Laterality: N/A;  afib/afl, PVI (re-do)/CTI, RFA, REENA (cx if SR), SHADY, anes, MB, 3 Prep    APPLICATION OF WOUND VACUUM-ASSISTED CLOSURE DEVICE Left 8/3/2020    Procedure: APPLICATION, WOUND VAC;  Surgeon: SEMAJ Márquez III, MD;  Location: [facility] OR 2ND FLR;  Service: Peripheral Vascular;  Laterality: Left;    APPLICATION OF WOUND VACUUM-ASSISTED CLOSURE DEVICE Left 8/6/2020    Procedure: APPLICATION, WOUND VAC;  Surgeon: SEMAJ Márquez III, MD;  Location: [facility] OR 2ND FLR;  Service: Peripheral Vascular;  Laterality: Left;    CARPAL TUNNEL RELEASE Right 6/10/2020    Procedure: RELEASE, CARPAL TUNNEL - RIGHT;  Surgeon: Adelaida Hall MD;  Location: [facility];  Service: Orthopedics;  Laterality: Right;  GENERAL AND REGIONAL    CARPAL TUNNEL RELEASE Left 5/5/2021    Procedure: RELEASE, CARPAL TUNNEL, LEFT;  Surgeon: Adeladia Hall MD;  Location: [facility] OR;  Service: Orthopedics;  Laterality: Left;  GENERAL & REGIONAL IN PACU    CLOSURE OF WOUND Left 8/6/2020    Procedure: CLOSURE, WOUND;  Surgeon: SEMAJ Márquez III, MD;  [facility];  Service: Peripheral Vascular;  Laterality: Left;  Complex    COLONOSCOPY N/A 8/17/2021    Procedure: COLONOSCOPY Suprep;  Surgeon: Loco Deluca MD;  Location: [facility];  Service: Endoscopy;  Laterality: N/A;    ESOPHAGOGASTRODUODENOSCOPY N/A 8/17/2021    Procedure: EGD (ESOPHAGOGASTRODUODENOSCOPY);  Surgeon: Loco Deluca MD;  Location: [facility];  Service: Endoscopy;  Laterality: N/A;  Patient is schedule to have her Covid test on 08/14/2021 at the [facility] @ 9:30am. [place].    EXPLORATION OF FEMORAL ARTERY Left 7/21/2020    Procedure: EXPLORATION, ARTERY, FEMORAL;  Surgeon: SEMAJ Márquez III, MD;  Location: [facility] OR 2ND FLR;  Service: Peripheral Vascular;  Laterality: Left;  1. Urgent Direct repair, L SFA branch laceration    FOOT SURGERY      HYSTEROSCOPY WITH DILATION AND CURETTAGE OF UTERUS N/A 2/19/2022     Procedure: HYSTEROSCOPY, WITH DILATION AND CURETTAGE OF UTERUS;  Surgeon: Shane Palomo MD;  [address];  Service: OB/GYN;  Laterality: N/A;    INCISION AND DRAINAGE OF KNEE Left 5/12/2022    Procedure: INCISION AND DRAINAGE, Prepatellar bursectomy.;  Surgeon: Dre Guzmán MD;  [address];  Service: Orthopedics;  Laterality: Left;    LEFT HEART CATHETERIZATION Left 2/7/2023    Procedure: Left heart cath;  Surgeon: Josiah Terry MD;  Location: [facility] CATH LAB;  Service: Cardiology;  Laterality: Left;  borderline moderate bleeding risk 6.3%    ROBOT-ASSISTED LAPAROSCOPIC ABDOMINAL HYSTERECTOMY USING DA KEIRY XI N/A 8/9/2022    Procedure: XI ROBOTIC HYSTERECTOMY;  Surgeon: Yolanda Barriga MD;  Location: [facility];  Service: OB/GYN;  Laterality: N/A;  dual console requested    ROBOT-ASSISTED LAPAROSCOPIC SALPINGO-OOPHORECTOMY Bilateral 8/9/2022    Procedure: ROBOTIC SALPINGO-OOPHORECTOMY;  Surgeon: Yolanda Barriga MD;  Location: [facility];  Service: OB/GYN;  Laterality: Bilateral;    TREATMENT OF CARDIAC ARRHYTHMIA N/A 1/29/2020    Procedure: CARDIOVERSION;  Surgeon: Gabriel Hawley MD;  Location: [facility] EP LAB;  Service: Cardiology;  Laterality: N/A;  af, demi, dccv, anes, mb, 345    TREATMENT OF CARDIAC ARRHYTHMIA  1/24/2022    Procedure: Cardioversion or Defibrillation;  Surgeon: Gabriel Hawley MD;  Location: [facility] EP LAB;  Service: Cardiology;;    WOUND DEBRIDEMENT Left 8/6/2020    Procedure: DEBRIDEMENT, WOUND;  Surgeon: SEMAJ Márquez III, MD;  [address];  Service: Peripheral Vascular;  Laterality: Left;       Review of patient's allergies indicates:   Allergen Reactions    Flecainide Shortness Of Breath and Swelling    Vancomycin analogues Hives, Itching and Rash       Current Facility-Administered Medications on File Prior to Encounter   Medication    sodium chloride 0.9% bolus 1,000 mL     Current Outpatient Medications on File Prior to Encounter    Medication Sig    albuterol (VENTOLIN HFA) 90 mcg/actuation inhaler Inhale 2 puffs into the lungs every 6 (six) hours as needed for Wheezing. Rescue    ALPRAZolam (XANAX) 0.5 MG tablet Take 1 tablet (0.5 mg total) by mouth 2 (two) times daily as needed for Anxiety.    apixaban (ELIQUIS) 5 mg Tab Take 1 tablet (5 mg total) by mouth 2 (two) times daily.    atorvastatin (LIPITOR) 80 MG tablet Take 1 tablet (80 mg total) by mouth once daily.    b complex vitamins capsule Take 1 capsule by mouth once daily.    colchicine (COLCRYS) 0.6 mg tablet For gout attack: Take TWO tablets by mouth, then, 2 hours later, take ONE additional tablet. Then take 1 tablet twice daily only if still needed for gout pain.    DULoxetine (CYMBALTA) 60 MG capsule Take 2 capsules (120 mg total) by mouth once daily.    ferrous sulfate (SLOW RELEASE IRON) 142 mg (45 mg iron) TbSR Take 1 tablet (142 mg total) by mouth once daily. FOR 7 DAY THEN INCREASE TO TWICE DAILY AS TOLERATED    furosemide (LASIX) 40 MG tablet Take 1 tablet (40 mg total) by mouth 2 (two) times daily. Resume as needed for edema until followup with your PCP.    gluc-shikha-[facility]#3-E-oddr-reymundo-bor 750 mg-644 mg- 30 mg-1 mg Tab Take 1 tablet by mouth once daily.     ibuprofen (ADVIL,MOTRIN) 600 MG tablet Take 1 tablet (600 mg total) by mouth every 6 (six) hours as needed for Pain.    krill/om-3/dha/epa/phospho/ast (MEGARED OMEGA-3 KRILL OIL ORAL) Take 1 capsule by mouth once daily.    melatonin 10 mg Tab Take 1-2 tablets by mouth nightly as needed (Sleep).    metoprolol succinate (TOPROL-XL) 50 MG 24 hr tablet Take 4 tablets (200 mg total) by mouth once daily.    multivitamin (THERAGRAN) per tablet Take 1 tablet by mouth once daily.    NIFEdipine (PROCARDIA-XL) 90 MG (OSM) 24 hr tablet Take 1 tablet (90 mg total) by mouth once daily. HOLD UNTIL FOLLOW-UP WITH YOUR PCP.    oxyCODONE-acetaminophen (PERCOCET) 7.5-325 mg per tablet Take 1 tablet by mouth every 8 (eight) hours as needed  for Pain.    potassium chloride SA (K-DUR,KLOR-CON) 20 MEQ tablet Take 1 tablet (20 mEq total) by mouth once daily. ONLY TAKE WITH LASIX.    predniSONE (DELTASONE) 10 MG tablet Take 1 tablet by mouth daily    propafenone (RYTHMOL) 225 MG Tab Take 1 tablet (225 mg total) by mouth every 8 (eight) hours.    SENNA 8.6 mg tablet Take 1 tablet by mouth 2 (two) times daily. (Patient taking differently: Take 1 tablet by mouth 2 (two) times daily. prn)    traZODone (DESYREL) 100 MG tablet Take 1 tablet (100 mg total) by mouth nightly as needed for Insomnia.    walker (ULTRA-LIGHT ROLLATOR) Misc As directed    [DISCONTINUED] medroxyPROGESTERone (PROVERA) 10 MG tablet Take 2 tablets (20 mg total) by mouth 3 (three) times daily.    [DISCONTINUED] pantoprazole (PROTONIX) 40 MG tablet Take 1 tablet (40 mg total) by mouth once daily. (Patient not taking: Reported on 2/18/2022)     Family History       Problem Relation (Age of Onset)    Cataracts Mother    Diabetes Father    Hypertension Mother, Father, Sister, Brother    Thyroid disease Mother          Tobacco Use    Smoking status: Former     Types: Cigarettes     Quit date: 7/1/2019     Years since quitting: 3.9    Smokeless tobacco: Never   Substance and Sexual Activity    Alcohol use: No    Drug use: No    Sexual activity: Not Currently     Partners: Male     Birth control/protection: See Surgical Hx     Review of Systems   All other systems reviewed and are negative.  Objective:     Vital Signs (Most Recent):    Vital Signs (24h Range):  BP: ()/()   Arterial Line BP: ()/()           There is no height or weight on file to calculate BMI.             Physical Exam  Vitals and nursing note reviewed.   Constitutional:       Appearance: Normal appearance.   HENT:      Head: Normocephalic.   Cardiovascular:      Rate and Rhythm: Normal rate and regular rhythm.      Pulses: Normal pulses.      Heart sounds: Normal heart sounds.   Pulmonary:      Effort: Pulmonary effort is normal.       Breath sounds: Normal breath sounds.   Musculoskeletal:         General: Normal range of motion.      Cervical back: Normal range of motion.      Right lower leg: No edema.      Left lower leg: No edema.   Skin:     General: Skin is warm.   Neurological:      Mental Status: She is alert.          Significant Labs: All pertinent lab results from the last 24 hours have been reviewed.      Assessment and Plan:     Atrial Fibrillation (paroxysmal)  Patient here for LAAO with Dr. Hawley for  bleeding and anemia.     Anticoagulation: apixiban  Antiplatelet(s): None  Most recent EF: 60%  Hgb:9.6  INR:  Cr: 0.8  Contrast allergy: no    Recommendations following Watchman implantation:  OAC plus ASA 81 mg daily x 6 weeks  6 week follow up REENA  If REENA shows well seated device without DRT and abscence of peridevice leak (or <5 mm) will stop OAC and start clopidogrel 75 mg daily (including ASA 81 mg daily) for 6 months  Following 6 months, will transition to ASA monotherapy    The risks, benefits and alternatives of the procedure were explained to the patient, patient's family and/or surrogate decision maker. Risks include (but not limited to) bleeding, hematoma, infection, pain, vascular damage, damage to structures surrounding the vasculature, myocardial damage [perforation, valvular damage], cardiac tamponade, CVA, MI, and death. All questions were answered. Patient is understanding of these risks, and would like to proceed. Consents signed.        Geovanna Garcia MD  Cardiac Electrophysiology  [facility] - Short Stay Cardiac Unit

## 2023-06-12 NOTE — Clinical Note
An angiography was performed of the EMILIANO. The angiography was performed via hand injection with 5 mL of contrast.

## 2023-06-12 NOTE — NURSING
Sutures removed, no bleeding at this time. Head of bed elevated to 30 degrees. Will continue to monitor.

## 2023-06-12 NOTE — ASSESSMENT & PLAN NOTE
Patient here for LAAO with Dr. Hawley for  bleeding and anemia.     Anticoagulation: apixiban  Antiplatelet(s): None  Most recent EF: 60%  Hgb:9.6  INR:  Cr: 0.8  Contrast allergy: no    Recommendations following Watchman implantation:  1. OAC plus ASA 81 mg daily x 6 weeks  2. 6 week follow up REENA  3. If REENA shows well seated device without DRT and abscence of peridevice leak (or <5 mm) will stop OAC and start clopidogrel 75 mg daily (including ASA 81 mg daily) for 6 months  4. Following 6 months, will transition to ASA monotherapy    The risks, benefits and alternatives of the procedure were explained to the patient, patient's family and/or surrogate decision maker. Risks include (but not limited to) bleeding, hematoma, infection, pain, vascular damage, damage to structures surrounding the vasculature, myocardial damage [perforation, valvular damage], cardiac tamponade, CVA, MI, and death. All questions were answered. Patient is understanding of these risks, and would like to proceed. Consents signed.

## 2023-06-12 NOTE — HPI
57 yoF here for LAAO implantation. She has a history of AF managed with RFA x , last one being 4/22. Regarding her AF, She feels that the AF was compromising her QOL She has also developed  bleeding and is due for hysterectomy later this spring. Symptoms much improved. She has no sustained arrhythmias since her last ablation. She has developed fatigue and Fe deficient anemia. I offered LAAO due to elevated bleeding and stroke risks. The patient can tolerate short term OAC but is not a candidate for long term OAC due to recurrent bleeding issues. I discussed management options regarding LAAO. I discussed the process of LAAO via Watchman including pre-procedure testing  as well as the need to take OAC for 6 weeks post implant. We discussed post procedure REENA studies to assess EMILIANO occlusion. Additionally I mentioned the need to take DAPT up to the 6 month point post implant.

## 2023-06-12 NOTE — HOSPITAL COURSE
S/P 27MM watchman FLX implantation with 20% compression. Groins with no hematoma. Plan for discharge today with 45 day post watchman REENA.

## 2023-06-12 NOTE — NURSING
Pt arrived to the floor with no signs of distress noted. Aox4. VSS. Bed to remain flat until 3pm. Incision site checked. No bleeding noted sutures visible. Will continue to monitor.

## 2023-06-12 NOTE — DISCHARGE SUMMARY
[facility] - Cardiology Stepdown  Cardiac Electrophysiology  Discharge Summary      Patient Name: Vicky Alvarado  MRN: [id number]  Admission Date: 6/12/2023  Hospital Length of Stay: 0 days  Discharge Date and Time:  06/12/2023 3:38 PM  Attending Physician: Gabriel Hawley MD    Discharging Provider: Geovanna Garcia MD  Primary Care Physician: Gordy Cordero MD    HPI:   57 yoF here for LAAO implantation. She has a history of AF managed with RFA x , last one being 4/22. Regarding her AF, She feels that the AF was compromising her QOL She has also developed  bleeding and is due for hysterectomy later this spring. Symptoms much improved. She has no sustained arrhythmias since her last ablation. She has developed fatigue and Fe deficient anemia. I offered LAAO due to elevated bleeding and stroke risks. The patient can tolerate short term OAC but is not a candidate for long term OAC due to recurrent bleeding issues. I discussed management options regarding LAAO. I discussed the process of LAAO via Watchman including pre-procedure testing  as well as the need to take OAC for 6 weeks post implant. We discussed post procedure REENA studies to assess EMILIANO occlusion. Additionally I mentioned the need to take DAPT up to the 6 month point post implant.       Procedure(s) (LRB):  Left atrial appendage occlusion (N/A)  ECHOCARDIOGRAM, TRANSESOPHAGEAL (N/A)     Indwelling Lines/Drains at time of discharge:  Lines/Drains/Airways     None                 Hospital Course:  S/P 27MM watchman FLX implantation with 20% compression. Groins with no hematoma. Plan for discharge today with 45 day post watchman REENA.      Goals of Care Treatment Preferences:  Code Status: Full Code      Consults:     Significant Diagnostic Studies: N/A    Pending Diagnostic Studies:     None          Final Active Diagnoses:    Diagnosis Date Noted POA    Atrial Fibrillation (paroxysmal) [I48.0] 02/26/2020 Yes      Problems Resolved During this  Admission:     No new Assessment & Plan notes have been filed under this hospital service since the last note was generated.  Service: Arrhythmia      Discharged Condition: stable    Disposition:     Follow Up:    Patient Instructions:   No discharge procedures on file.  Medications:  Reconciled Home Medications:      Medication List      CHANGE how you take these medications    potassium chloride SA 20 MEQ tablet  Commonly known as: K-DUR,KLOR-CON  Take 1 tablet (20 mEq total) by mouth once daily. ONLY TAKE WITH LASIX.  What changed: Another medication with the same name was removed. Continue taking this medication, and follow the directions you see here.        CONTINUE taking these medications    albuterol 90 mcg/actuation inhaler  Commonly known as: VENTOLIN HFA  Inhale 2 puffs into the lungs every 6 (six) hours as needed for Wheezing. Rescue     ALPRAZolam 0.5 MG tablet  Commonly known as: XANAX  Take 1 tablet (0.5 mg total) by mouth 2 (two) times daily as needed for Anxiety.     atorvastatin 80 MG tablet  Commonly known as: LIPITOR  Take 1 tablet (80 mg total) by mouth once daily.     b complex vitamins capsule  Take 1 capsule by mouth once daily.     colchicine 0.6 mg tablet  Commonly known as: COLCRYS  For gout attack: Take TWO tablets by mouth, then, 2 hours later, take ONE additional tablet. Then take 1 tablet twice daily only if still needed for gout pain.     DULoxetine 60 MG capsule  Commonly known as: CYMBALTA  Take 2 capsules (120 mg total) by mouth once daily.     ELIQUIS 5 mg Tab  Generic drug: apixaban  Take 1 tablet (5 mg total) by mouth 2 (two) times daily.     ferrous sulfate 142 mg (45 mg iron) Tbsr  Commonly known as: SLOW RELEASE IRON  Take 1 tablet (142 mg total) by mouth once daily. FOR 7 DAY THEN INCREASE TO TWICE DAILY AS TOLERATED     furosemide 40 MG tablet  Commonly known as: LASIX  Take 1 tablet (40 mg total) by mouth 2 (two) times daily. Resume as needed for edema until followup with  your PCP.     sumjraws-zbci-xqv6-C-radha-bosw 750 mg-644 mg- 30 mg-1 mg Tab  Take 1 tablet by mouth once daily.     ibuprofen 600 MG tablet  Commonly known as: ADVIL,MOTRIN  Take 1 tablet (600 mg total) by mouth every 6 (six) hours as needed for Pain.     lisinopriL 40 MG tablet  Commonly known as: PRINIVIL,ZESTRIL  Take 1 tablet (40 mg total) by mouth once daily.     MEGARED OMEGA-3 KRILL OIL ORAL  Take 1 capsule by mouth once daily.     melatonin 10 mg Tab  Take 1-2 tablets by mouth nightly as needed (Sleep).     metoprolol succinate 50 MG 24 hr tablet  Commonly known as: TOPROL-XL  Take 4 tablets (200 mg total) by mouth once daily.     MOUNJARO 7.5 mg/0.5 mL Pnij  Generic drug: tirzepatide  Inject 7.5 mg into the skin every 7 days. for 4 doses  Start taking on: June 16, 2023     multivitamin per tablet  Commonly known as: THERAGRAN  Take 1 tablet by mouth once daily.     NIFEdipine 90 MG (OSM) 24 hr tablet  Commonly known as: PROCARDIA-XL  Take 1 tablet (90 mg total) by mouth once daily. HOLD UNTIL FOLLOW-UP WITH YOUR PCP.     omeprazole 20 MG capsule  Commonly known as: PRILOSEC  TAKE 1 CAPSULE BY MOUTH ONCE DAILY     oxyCODONE-acetaminophen 7.5-325 mg per tablet  Commonly known as: PERCOCET  Take 1 tablet by mouth every 8 (eight) hours as needed for Pain.     predniSONE 10 MG tablet  Commonly known as: DELTASONE  Take 1 tablet by mouth daily     propafenone 225 MG Tab  Commonly known as: RYTHMOL  Take 1 tablet (225 mg total) by mouth every 8 (eight) hours.     SENNA 8.6 mg tablet  Generic drug: senna  Take 1 tablet by mouth 2 (two) times daily.     traZODone 100 MG tablet  Commonly known as: DESYREL  Take 1 tablet (100 mg total) by mouth nightly as needed for Insomnia.     ULTRA-LIGHT ROLLATOR Misc  Generic drug: walker  As directed            Time spent on the discharge of patient: 45 minutes    Geovanna Garcia MD  Cardiac Electrophysiology  [facility] - Cardiology Stepdown

## 2023-06-12 NOTE — ANESTHESIA POSTPROCEDURE EVALUATION
Anesthesia Post Evaluation    Patient: Vicky Alvarado    Procedure(s) Performed: Procedure(s) (LRB):  Left atrial appendage occlusion (N/A)  ECHOCARDIOGRAM, TRANSESOPHAGEAL (N/A)    Final Anesthesia Type: general      Level of consciousness: awake and alert  Post-procedure vital signs: reviewed and stable  Pain control: Pain has been treated.  Airway patency: patent    PONV status: Absent or treated.  Anesthetic complications: no      Cardiovascular status: hemodynamically stable  Respiratory status: unassisted  Hydration status: euvolemic            Vitals Value Taken Time   /58 06/12/23 1116   Temp 36.6 °C (97.8 °F) 06/12/23 1116   Pulse 80 06/12/23 1116   Resp 18 06/12/23 1116   SpO2 94 % 06/12/23 1116         No case tracking events are documented in the log.      Pain/Tolu Score: Pain Rating Prior to Med Admin: 5 (6/12/2023 10:14 AM)  Pain Rating Post Med Admin: 4 (6/12/2023 10:15 AM)  Tolu Score: 9 (6/12/2023  9:45 AM)

## 2023-06-12 NOTE — TRANSFER OF CARE
"Anesthesia Transfer of Care Note    Patient: Vicky Alvarado    Procedure(s) Performed: Procedure(s) (LRB):  Left atrial appendage occlusion (N/A)  ECHOCARDIOGRAM, TRANSESOPHAGEAL (N/A)    Patient location: PACU    Anesthesia Type: general    Transport from OR: Transported from OR on 6-10 L/min O2 by face mask with adequate spontaneous ventilation    Post pain: adequate analgesia    Post assessment: no apparent anesthetic complications    Post vital signs: stable    Level of consciousness: awake and alert    Nausea/Vomiting: no nausea/vomiting    Complications: none    Transfer of care protocol was followed      Last vitals:   Visit Vitals  BP (!) 102/58   Pulse 85   Temp 36.4 °C (97.5 °F) (Temporal)   Resp 20   Ht 5' 3" (1.6 m)   Wt 123.8 kg (273 lb)   LMP 03/25/2022 (Exact Date)   SpO2 (!) 93%   Breastfeeding No   BMI 48.36 kg/m²     "

## 2023-06-12 NOTE — CONSULTS
[facility], [place]  REENA Consult      Vicky Alvarado  YOB: 1965  Medical Record Number:  [id number]  Attending Physician:  Gabriel Hawley MD   Date of Admission: 6/12/2023       Hospital Day:  0  Current Principal Problem:  <principal problem not specified>      History     Cc: atrial fibrillation     HPI  Ms Vicky Alvarado is a 56 yo female with PMH of atrial fibrillation here for LAAO implantation. She has a history of AF managed with RFA, last one being 4/22. Regarding her AF, She feels that the AF was compromising her QOL She has also developed  bleeding and is due for hysterectomy later this spring. Symptoms much improved. She has no sustained arrhythmias since her last ablation. She has developed fatigue and Fe deficient anemia. She recently saw Dr. Hawley in EP clinic and offered LAAO due to elevated bleeding and stroke risks. The patient can tolerate short term OAC but is not a candidate for long term OAC due to recurrent bleeding issues. She presents today for the aforementioned procedure.       Anticoagulant/antiplatelets: apixaban 5 mg BID   ECG: NSR with RBBB      Dysphagia or odynophagia:  No   Liver Disease, esophageal disease, or known varices:   No   Upper GI Bleeding:  No   Snoring:   No   Sleep Apnea:   No   Prior neck surgery or radiation:   No   History of anesthetic difficulties:   No   Family history of anesthetic difficulties:   No   Last oral intake: Yesterday PM   Able to move neck in all directions: Yes   Platelet count: 253k   INR: 1.2         Medications - Outpatient  Prior to Admission medications    Medication Sig Start Date End Date Taking? Authorizing Provider   albuterol (VENTOLIN HFA) 90 mcg/actuation inhaler Inhale 2 puffs into the lungs every 6 (six) hours as needed for Wheezing. Rescue 2/17/23 2/17/24 Yes Gordy Cordero MD   ALPRAZolam (XANAX) 0.5 MG tablet Take 1 tablet (0.5 mg total) by mouth 2 (two) times daily as needed for  Anxiety. 3/13/23  Yes Gordy Cordero MD   apixaban (ELIQUIS) 5 mg Tab Take 1 tablet (5 mg total) by mouth 2 (two) times daily. 11/23/22  Yes Gabriel Hawley MD   atorvastatin (LIPITOR) 80 MG tablet Take 1 tablet (80 mg total) by mouth once daily. 3/16/23 3/15/24 Yes Cory Kilgore MD   b complex vitamins capsule Take 1 capsule by mouth once daily.   Yes Historical Provider   colchicine (COLCRYS) 0.6 mg tablet For gout attack: Take TWO tablets by mouth, then, 2 hours later, take ONE additional tablet. Then take 1 tablet twice daily only if still needed for gout pain. 10/10/22  Yes Gordy Cordero MD   DULoxetine (CYMBALTA) 60 MG capsule Take 2 capsules (120 mg total) by mouth once daily. 9/9/22  Yes Gordy Cordero MD   furosemide (LASIX) 40 MG tablet Take 1 tablet (40 mg total) by mouth 2 (two) times daily. Resume as needed for edema until followup with your PCP. 3/29/23 3/28/24 Yes Cory Kilgore MD   gluc-[facility]ha-[facility] [place]V-stbe-reymundo-bor 750 mg-644 mg- 30 mg-1 mg Tab Take 1 tablet by mouth once daily.  1/1/15  Yes Historical Provider   ibuprofen (ADVIL,MOTRIN) 600 MG tablet Take 1 tablet (600 mg total) by mouth every 6 (six) hours as needed for Pain. 8/13/22  Yes Sarika Jordan MD   krill/om-3/dha/epa/phospho/ast (MEGARED OMEGA-3 KRILL OIL ORAL) Take 1 capsule by mouth once daily.   Yes Historical Provider   lisinopriL (PRINIVIL,ZESTRIL) 40 MG tablet Take 1 tablet (40 mg total) by mouth once daily. 5/23/23 5/22/24 Yes Gordy Cordero MD   metoprolol succinate (TOPROL-XL) 50 MG 24 hr tablet Take 4 tablets (200 mg total) by mouth once daily. 3/29/23 8/23/23 Yes Cory Kilgore MD   multivitamin (THERAGRAN) per tablet Take 1 tablet by mouth once daily.   Yes Historical Provider   NIFEdipine (PROCARDIA-XL) 90 MG (OSM) 24 hr tablet Take 1 tablet (90 mg total) by mouth once daily. HOLD UNTIL FOLLOW-UP WITH YOUR PCP. 3/29/23 3/28/24 Yes Cory Kilgore MD   omeprazole (PRILOSEC) 20 MG capsule TAKE 1 CAPSULE BY MOUTH  ONCE DAILY 5/23/23  Yes Gordy Cordero MD   potassium chloride SA (K-DUR,KLOR-CON) 20 MEQ tablet Take 1 tablet (20 mEq total) by mouth once daily. ONLY TAKE WITH LASIX. 2/27/22  Yes Debra Carreon MD   propafenone (RYTHMOL) 225 MG Tab Take 1 tablet (225 mg total) by mouth every 8 (eight) hours. 1/11/23 1/11/24 Yes Gabriel Hawley MD   SENNA 8.6 mg tablet Take 1 tablet by mouth 2 (two) times daily.  Patient taking differently: Take 1 tablet by mouth 2 (two) times daily. prn 8/13/22  Yes Sarika Jordan MD   traZODone (DESYREL) 100 MG tablet Take 1 tablet (100 mg total) by mouth nightly as needed for Insomnia. 7/15/22  Yes Gordy Cordero MD   ferrous sulfate (SLOW RELEASE IRON) 142 mg (45 mg iron) TbSR Take 1 tablet (142 mg total) by mouth once daily. FOR 7 DAY THEN INCREASE TO TWICE DAILY AS TOLERATED 2/27/22   Debra Carreon MD   melatonin 10 mg Tab Take 1-2 tablets by mouth nightly as needed (Sleep).    Historical Provider   oxyCODONE-acetaminophen (PERCOCET) 7.5-325 mg per tablet Take 1 tablet by mouth every 8 (eight) hours as needed for Pain. 3/6/23   Gordy Cordero MD   potassium chloride (KLOR-CON) 20 mEq Pack Take 1 pack (20 mEq) by mouth 4 (four) times daily. 4/20/23   Kiko Saba MD   predniSONE (DELTASONE) 10 MG tablet Take 1 tablet by mouth daily 3/20/23   Gordy Cordero MD   tirzepatide (MOUNJARO) 7.5 mg/0.5 mL PnIj Inject 7.5 mg into the skin every 7 days. for 4 doses 6/16/23 7/8/23  MD kwadwo Oliver (ULTRA-LIGHT ROLLATOR) Misc As directed 2/13/23   Gordy Cordero MD   medroxyPROGESTERone (PROVERA) 10 MG tablet Take 2 tablets (20 mg total) by mouth 3 (three) times daily. 7/13/22 8/9/22  Yolanda Barriga MD   pantoprazole (PROTONIX) 40 MG tablet Take 1 tablet (40 mg total) by mouth once daily.  Patient not taking: Reported on 2/18/2022 1/25/22 2/27/22  Geovanna Garcia MD         Medications - Current  Scheduled Meds:  Continuous Infusions:  PRN Meds:.sodium  chloride, ceFAZolin (ANCEF) IVPB      Allergies  Review of patient's allergies indicates:   Allergen Reactions    Flecainide Shortness Of Breath and Swelling    Shellfish containing products Other (See Comments)     Makes gout terrible    Vancomycin analogues Hives, Itching and Rash         Past Medical History  Past Medical History:   Diagnosis Date    Anticoagulant long-term use     Arthritis     Atrial fibrillation     cardioversion    Atrial fibrillation with RVR     Avascular necrosis     L hand    CHF (congestive heart failure)     Depression     Diabetes mellitus     Encounter for blood transfusion 07/22/2020    Encounter for blood transfusion 03/2022    Fatty liver     GERD (gastroesophageal reflux disease)     Hx of psychiatric care     Hypertension     Iron deficiency anemia 05/07/2022    TIBC 444 with saturated iron 8    Liver disease     Obese     Pre-diabetes 05/07/2022    Psychiatric problem     Sleep apnea     wears cpap         Past Surgical History  Past Surgical History:   Procedure Laterality Date    ABLATION OF ARRHYTHMOGENIC FOCUS FOR ATRIAL FIBRILLATION N/A 7/20/2020    Procedure: Ablation atrial fibrillation;  Surgeon: Gabriel Hawley MD;  Location: [facility] EP LAB;  Service: Cardiology;  Laterality: N/A;  afib, PVI, RFA, REENA, SHADY, anes, MB, 3 Prep    ABLATION OF ARRHYTHMOGENIC FOCUS FOR ATRIAL FIBRILLATION N/A 1/24/2022    Procedure: Ablation atrial fibrillation;  Surgeon: Gabriel Hawley MD;  Location: [facility] EP LAB;  Service: Cardiology;  Laterality: N/A;  afib/afl, PVI (re-do)/CTI, RFA, REENA (cx if SR), SHADY, anes, MB, 3 Prep    APPLICATION OF WOUND VACUUM-ASSISTED CLOSURE DEVICE Left 8/3/2020    Procedure: APPLICATION, WOUND VAC;  Surgeon: SEMAJ Márquez III, MD;  Location: [facility] OR [address] [place] [address];  Service: Peripheral Vascular;  Laterality: Left;    APPLICATION OF WOUND VACUUM-ASSISTED CLOSURE DEVICE Left 8/6/2020    Procedure: APPLICATION, WOUND VAC;  Surgeon: SEMAJ Márquez III, MD;   Location: [facility] OR 2ND FLR;  Service: Peripheral Vascular;  Laterality: Left;    CARPAL TUNNEL RELEASE Right 6/10/2020    Procedure: RELEASE, CARPAL TUNNEL - RIGHT;  Surgeon: Adelaida Hall MD;  Location: [facility] OR;  Service: Orthopedics;  Laterality: Right;  GENERAL AND REGIONAL    CARPAL TUNNEL RELEASE Left 5/5/2021    Procedure: RELEASE, CARPAL TUNNEL, LEFT;  Surgeon: Adelaida Hall MD;  Location: [facility] OR;  Service: Orthopedics;  Laterality: Left;  GENERAL & REGIONAL IN PACU    CLOSURE OF WOUND Left 8/6/2020    Procedure: CLOSURE, WOUND;  Surgeon: SEMAJ Márquez III, MD;  [facility];  Service: Peripheral Vascular;  Laterality: Left;  Complex    COLONOSCOPY N/A 8/17/2021    Procedure: COLONOSCOPY Suprep;  Surgeon: Loco Deluca MD;  Location: [facility];  Service: Endoscopy;  Laterality: N/A;    ESOPHAGOGASTRODUODENOSCOPY N/A 8/17/2021    Procedure: EGD (ESOPHAGOGASTRODUODENOSCOPY);  Surgeon: Looc Deluca MD;  Location: [facility];  Service: Endoscopy;  Laterality: N/A;  Patient is schedule to have her Covid test on 08/14/2021 at the [facility] @ 9:30am. [place].    EXPLORATION OF FEMORAL ARTERY Left 7/21/2020    Procedure: EXPLORATION, ARTERY, FEMORAL;  Surgeon: SEMAJ Márquez III, MD;  [facility];  Service: Peripheral Vascular;  Laterality: Left;  1. Urgent Direct repair, L SFA branch laceration    FOOT SURGERY      HYSTEROSCOPY WITH DILATION AND CURETTAGE OF UTERUS N/A 2/19/2022    Procedure: HYSTEROSCOPY, WITH DILATION AND CURETTAGE OF UTERUS;  Surgeon: Shane Palomo MD;  [facility];  Service: OB/GYN;  Laterality: N/A;    INCISION AND DRAINAGE OF KNEE Left 5/12/2022    Procedure: INCISION AND DRAINAGE, Prepatellar bursectomy.;  Surgeon: Dre Guzmán MD;  [facility];  Service: Orthopedics;  Laterality: Left;    LEFT HEART CATHETERIZATION Left 2/7/2023    Procedure: Left heart cath;  Surgeon: Josiah Terry MD;   Location: [facility] CATH LAB;  Service: Cardiology;  Laterality: Left;  borderline moderate bleeding risk 6.3%    ROBOT-ASSISTED LAPAROSCOPIC ABDOMINAL HYSTERECTOMY USING DA KEIRY XI N/A 8/9/2022    Procedure: XI ROBOTIC HYSTERECTOMY;  Surgeon: Yolanda Barriga MD;  Location: [facility];  Service: OB/GYN;  Laterality: N/A;  dual console requested    ROBOT-ASSISTED LAPAROSCOPIC SALPINGO-OOPHORECTOMY Bilateral 8/9/2022    Procedure: ROBOTIC SALPINGO-OOPHORECTOMY;  Surgeon: Yolanda Barriga MD;  Location: [facility] OR;  Service: OB/GYN;  Laterality: Bilateral;    TREATMENT OF CARDIAC ARRHYTHMIA N/A 1/29/2020    Procedure: CARDIOVERSION;  Surgeon: Gabriel Hawley MD;  Location: [facility] EP LAB;  Service: Cardiology;  Laterality: N/A;  af, demi, dccv, anes, mb, 345    TREATMENT OF CARDIAC ARRHYTHMIA  1/24/2022    Procedure: Cardioversion or Defibrillation;  Surgeon: Gabriel Hawley MD;  Location: [facility] EP LAB;  Service: Cardiology;;    WOUND DEBRIDEMENT Left 8/6/2020    Procedure: DEBRIDEMENT, WOUND;  Surgeon: SEMAJ Márquez III, MD;  [address]  Service: Peripheral Vascular;  Laterality: Left;         Social History  Social History     Socioeconomic History    Marital status:    Tobacco Use    Smoking status: Former     Types: Cigarettes     Quit date: 7/1/2019     Years since quitting: 3.9    Smokeless tobacco: Never   Substance and Sexual Activity    Alcohol use: No    Drug use: No    Sexual activity: Not Currently     Partners: Male     Birth control/protection: See Surgical Hx     Social Determinants of Health     Financial Resource Strain: Low Risk     Difficulty of Paying Living Expenses: Not very hard   Food Insecurity: No Food Insecurity    Worried About Running Out of Food in the Last Year: Never true    Ran Out of Food in the Last Year: Never true   Transportation Needs: No Transportation Needs    Lack of Transportation (Medical): No    Lack of Transportation (Non-Medical): No   Physical  Activity: Inactive    Days of Exercise per Week: 0 days    Minutes of Exercise per Session: 0 min   Stress: Stress Concern Present    Feeling of Stress : To some extent   Social Connections: Moderately Isolated    Frequency of Communication with Friends and Family: More than three times a week    Frequency of Social Gatherings with Friends and Family: More than three times a week    Attends [employer] Services: 1 to 4 times per year    Active Member of Clubs or Organizations: No    Attends Club or Organization Meetings: Never    Marital Status:    Housing Stability: Unknown    Unable to Pay for Housing in the Last Year: No    Unstable Housing in the Last Year: No         ROS  10 point ROS performed and negative except as stated in HPI     Physical Examination         Vital Signs  Vitals  BP: (!) 102/58          24 Hour VS Range    BP: (102)/(58)   No intake or output data in the 24 hours ending 06/12/23 0650      Physical Exam:   Constitutional: no acute distress  HEENT: NCAT, EOMI, no scleral icterus  Cardiovascular: Regular rate and rhythm  Pulmonary: Normal respiratory effort   Abdomen: nontender, non-distended   Neuro: alert and oriented, no focal deficits  Extremities: warm, no edema   MSK: no deformities  Integument: intact, no rashes       Data       Recent Labs   Lab 06/09/23  1444   WBC 8.11   HGB 12.9   HCT 41.3           No results for input(s): PROTIME, INR in the last 168 hours.     Recent Labs   Lab 06/09/23  1444      K 4.0   CL 95   CO2 27   BUN 14   CREATININE 0.8   ANIONGAP 15   CALCIUM 9.5        No results for input(s): PROT, ALBUMIN, BILITOT, ALKPHOS, AST, ALT in the last 168 hours.     No results for input(s): TROPONINI in the last 168 hours.     BNP (pg/mL)   Date Value   01/06/2021 86   01/28/2020 354 (H)       No results for input(s): LABBLOO in the last 168 hours.         Assessment & Plan   58 yo female with PMH of atrial fibrillation here for LAAO  implantation.    Atrial fibrillation   Elevated bleeding risk:   To EP lab for Watchman implant with REENA guidance.   -No absolute contraindications of esophageal stricture, tumor, perforation, laceration,or diverticulum and/or active GI bleed  -The risks, benefits & alternatives of the procedure were explained to the patient.   -The risks of transesophageal echo include but are not limited to:  Dental trauma, esophageal trauma/perforation, bleeding, laryngospasm/brochospasm, aspiration, sore throat/hoarseness, & dislodgement of the endotracheal tube/nasogastric tube (where applicable).    -The risks of moderate sedation include hypotension, respiratory depression, arrhythmias, bronchospasm, & death.    -Informed consent was obtained. The patient is agreeable to proceed with the procedure and all questions and concerns addressed        Hung Fuller MD  [facility]   Cardiovascular Disease PGY-V

## 2023-06-12 NOTE — NURSING TRANSFER
Nursing Transfer Note      6/12/2023     Reason patient is being transferred: to assigned room post recovery    Transfer To: 302    Transfer via bed    Transfer with cardiac monitoring    Transported by escort    Medicines sent: none    Any special needs or follow-up needed: continue bedrest w/ lay flat and frequent checks    Chart send with patient: Yes    Notified: brother    Patient reassessed at: to be done by receiving RN (date, time)

## 2023-06-12 NOTE — SUBJECTIVE & OBJECTIVE
Past Medical History:   Diagnosis Date    Anticoagulant long-term use     Arthritis     Atrial fibrillation     cardioversion    Atrial fibrillation with RVR     Avascular necrosis     L hand    CHF (congestive heart failure)     Depression     Encounter for blood transfusion 07/22/2020    Encounter for blood transfusion 03/2022    GERD (gastroesophageal reflux disease)     Hx of psychiatric care     Hypertension     Iron deficiency anemia 5/7/2022    TIBC 444 with saturated iron 8    Obese     Pre-diabetes 5/7/2022    Psychiatric problem     Sleep apnea     wears cpap       Past Surgical History:   Procedure Laterality Date    ABLATION OF ARRHYTHMOGENIC FOCUS FOR ATRIAL FIBRILLATION N/A 7/20/2020    Procedure: Ablation atrial fibrillation;  Surgeon: Gabriel Hawley MD;  Location: [facility] EP LAB;  Service: Cardiology;  Laterality: N/A;  afib, PVI, RFA, REENA, SHADY, anes, MB, 3 Prep    ABLATION OF ARRHYTHMOGENIC FOCUS FOR ATRIAL FIBRILLATION N/A 1/24/2022    Procedure: Ablation atrial fibrillation;  Surgeon: Gabriel Hawley MD;  Location: [facility] EP LAB;  Service: Cardiology;  Laterality: N/A;  afib/afl, PVI (re-do)/CTI, RFA, REENA (cx if SR), SHADY, anes, MB, 3 Prep    APPLICATION OF WOUND VACUUM-ASSISTED CLOSURE DEVICE Left 8/3/2020    Procedure: APPLICATION, WOUND VAC;  Surgeon: SEMAJ Márquez III, MD;  [address]  Service: Peripheral Vascular;  Laterality: Left;    APPLICATION OF WOUND VACUUM-ASSISTED CLOSURE DEVICE Left 8/6/2020    Procedure: APPLICATION, WOUND VAC;  Surgeon: SEMAJ Márquez III, MD;  [facility];  Service: Peripheral Vascular;  Laterality: Left;    CARPAL TUNNEL RELEASE Right 6/10/2020    Procedure: RELEASE, CARPAL TUNNEL - RIGHT;  Surgeon: Adelaida Hall MD;  Location: [facility];  Service: Orthopedics;  Laterality: Right;  GENERAL AND REGIONAL    CARPAL TUNNEL RELEASE Left 5/5/2021    Procedure: RELEASE, CARPAL TUNNEL, LEFT;  Surgeon: Adelaida Hall MD;   Location: [facility] OR;  Service: Orthopedics;  Laterality: Left;  GENERAL & REGIONAL IN PACU    CLOSURE OF WOUND Left 8/6/2020    Procedure: CLOSURE, WOUND;  Surgeon: SEMAJ Márquez III, MD;  [address];  Service: Peripheral Vascular;  Laterality: Left;  Complex    COLONOSCOPY N/A 8/17/2021    Procedure: COLONOSCOPY Suprep;  Surgeon: Loco Deluca MD;  Location: [facility] ENDO;  Service: Endoscopy;  Laterality: N/A;    ESOPHAGOGASTRODUODENOSCOPY N/A 8/17/2021    Procedure: EGD (ESOPHAGOGASTRODUODENOSCOPY);  Surgeon: Loco Deluca MD;  Location: [facility] ENDO;  Service: Endoscopy;  Laterality: N/A;  Patient is schedule to have her Covid test on 08/14/2021 at the [facility] @ 9:30am. [place].    EXPLORATION OF FEMORAL ARTERY Left 7/21/2020    Procedure: EXPLORATION, ARTERY, FEMORAL;  Surgeon: SEMAJ Márquez III, MD;  Location: [address];  Service: Peripheral Vascular;  Laterality: Left;  1. Urgent Direct repair, L SFA branch laceration    FOOT SURGERY      HYSTEROSCOPY WITH DILATION AND CURETTAGE OF UTERUS N/A 2/19/2022    Procedure: HYSTEROSCOPY, WITH DILATION AND CURETTAGE OF UTERUS;  Surgeon: Shane Palomo MD;  Location: [address];  Service: OB/GYN;  Laterality: N/A;    INCISION AND DRAINAGE OF KNEE Left 5/12/2022    Procedure: INCISION AND DRAINAGE, Prepatellar bursectomy.;  Surgeon: Dre Guzmán MD;  Location: [address];  Service: Orthopedics;  Laterality: Left;    LEFT HEART CATHETERIZATION Left 2/7/2023    Procedure: Left heart cath;  Surgeon: Josiah Terry MD;  Location: [facility] CATH LAB;  Service: Cardiology;  Laterality: Left;  borderline moderate bleeding risk 6.3%    ROBOT-ASSISTED LAPAROSCOPIC ABDOMINAL HYSTERECTOMY USING DA KEIRY XI N/A 8/9/2022    Procedure: XI ROBOTIC HYSTERECTOMY;  Surgeon: Yolanda Barriga MD;  Location: [facility] OR;  Service: OB/GYN;  Laterality: N/A;  dual console requested    ROBOT-ASSISTED LAPAROSCOPIC SALPINGO-OOPHORECTOMY Bilateral  8/9/2022    Procedure: ROBOTIC SALPINGO-OOPHORECTOMY;  Surgeon: Yolanda Barriga MD;  Location: [facility] OR;  Service: OB/GYN;  Laterality: Bilateral;    TREATMENT OF CARDIAC ARRHYTHMIA N/A 1/29/2020    Procedure: CARDIOVERSION;  Surgeon: Gabriel Hawley MD;  Location: [facility] EP LAB;  Service: Cardiology;  Laterality: N/A;  af, demi, dccv, anes, mb, 345    TREATMENT OF CARDIAC ARRHYTHMIA  1/24/2022    Procedure: Cardioversion or Defibrillation;  Surgeon: Gabriel Hawley MD;  Location: [facility] EP LAB;  Service: Cardiology;;    WOUND DEBRIDEMENT Left 8/6/2020    Procedure: DEBRIDEMENT, WOUND;  Surgeon: SEMAJ Márquez III, MD;  [address];  Service: Peripheral Vascular;  Laterality: Left;       Review of patient's allergies indicates:   Allergen Reactions    Flecainide Shortness Of Breath and Swelling    Vancomycin analogues Hives, Itching and Rash       Current Facility-Administered Medications on File Prior to Encounter   Medication    sodium chloride 0.9% bolus 1,000 mL     Current Outpatient Medications on File Prior to Encounter   Medication Sig    albuterol (VENTOLIN HFA) 90 mcg/actuation inhaler Inhale 2 puffs into the lungs every 6 (six) hours as needed for Wheezing. Rescue    ALPRAZolam (XANAX) 0.5 MG tablet Take 1 tablet (0.5 mg total) by mouth 2 (two) times daily as needed for Anxiety.    apixaban (ELIQUIS) 5 mg Tab Take 1 tablet (5 mg total) by mouth 2 (two) times daily.    atorvastatin (LIPITOR) 80 MG tablet Take 1 tablet (80 mg total) by mouth once daily.    b complex vitamins capsule Take 1 capsule by mouth once daily.    colchicine (COLCRYS) 0.6 mg tablet For gout attack: Take TWO tablets by mouth, then, 2 hours later, take ONE additional tablet. Then take 1 tablet twice daily only if still needed for gout pain.    DULoxetine (CYMBALTA) 60 MG capsule Take 2 capsules (120 mg total) by mouth once daily.    ferrous sulfate (SLOW RELEASE IRON) 142 mg (45 mg iron) TbSR Take 1 tablet (142 mg  total) by mouth once daily. FOR 7 DAY THEN INCREASE TO TWICE DAILY AS TOLERATED    furosemide (LASIX) 40 MG tablet Take 1 tablet (40 mg total) by mouth 2 (two) times daily. Resume as needed for edema until followup with your PCP.    gluc-shikha-[facility] [place]#[id number]-reymundo-bor 750 mg-644 mg- 30 mg-1 mg Tab Take 1 tablet by mouth once daily.     ibuprofen (ADVIL,MOTRIN) 600 MG tablet Take 1 tablet (600 mg total) by mouth every 6 (six) hours as needed for Pain.    krill/om-3/dha/epa/phospho/ast (MEGARED OMEGA-3 KRILL OIL ORAL) Take 1 capsule by mouth once daily.    melatonin 10 mg Tab Take 1-2 tablets by mouth nightly as needed (Sleep).    metoprolol succinate (TOPROL-XL) 50 MG 24 hr tablet Take 4 tablets (200 mg total) by mouth once daily.    multivitamin (THERAGRAN) per tablet Take 1 tablet by mouth once daily.    NIFEdipine (PROCARDIA-XL) 90 MG (OSM) 24 hr tablet Take 1 tablet (90 mg total) by mouth once daily. HOLD UNTIL FOLLOW-UP WITH YOUR PCP.    oxyCODONE-acetaminophen (PERCOCET) 7.5-325 mg per tablet Take 1 tablet by mouth every 8 (eight) hours as needed for Pain.    potassium chloride SA (K-DUR,KLOR-CON) 20 MEQ tablet Take 1 tablet (20 mEq total) by mouth once daily. ONLY TAKE WITH LASIX.    predniSONE (DELTASONE) 10 MG tablet Take 1 tablet by mouth daily    propafenone (RYTHMOL) 225 MG Tab Take 1 tablet (225 mg total) by mouth every 8 (eight) hours.    SENNA 8.6 mg tablet Take 1 tablet by mouth 2 (two) times daily. (Patient taking differently: Take 1 tablet by mouth 2 (two) times daily. prn)    traZODone (DESYREL) 100 MG tablet Take 1 tablet (100 mg total) by mouth nightly as needed for Insomnia.    walker (ULTRA-LIGHT ROLLATOR) Misc As directed    [DISCONTINUED] medroxyPROGESTERone (PROVERA) 10 MG tablet Take 2 tablets (20 mg total) by mouth 3 (three) times daily.    [DISCONTINUED] pantoprazole (PROTONIX) 40 MG tablet Take 1 tablet (40 mg total) by mouth once daily. (Patient not taking: Reported on 2/18/2022)     Family  History       Problem Relation (Age of Onset)    Cataracts Mother    Diabetes Father    Hypertension Mother, Father, Sister, Brother    Thyroid disease Mother          Tobacco Use    Smoking status: Former     Types: Cigarettes     Quit date: 7/1/2019     Years since quitting: 3.9    Smokeless tobacco: Never   Substance and Sexual Activity    Alcohol use: No    Drug use: No    Sexual activity: Not Currently     Partners: Male     Birth control/protection: See Surgical Hx     Review of Systems   All other systems reviewed and are negative.  Objective:     Vital Signs (Most Recent):    Vital Signs (24h Range):  BP: ()/()   Arterial Line BP: ()/()           There is no height or weight on file to calculate BMI.             Physical Exam  Vitals and nursing note reviewed.   Constitutional:       Appearance: Normal appearance.   HENT:      Head: Normocephalic.   Cardiovascular:      Rate and Rhythm: Normal rate and regular rhythm.      Pulses: Normal pulses.      Heart sounds: Normal heart sounds.   Pulmonary:      Effort: Pulmonary effort is normal.      Breath sounds: Normal breath sounds.   Musculoskeletal:         General: Normal range of motion.      Cervical back: Normal range of motion.      Right lower leg: No edema.      Left lower leg: No edema.   Skin:     General: Skin is warm.   Neurological:      Mental Status: She is alert.          Significant Labs: All pertinent lab results from the last 24 hours have been reviewed.

## 2023-06-13 ENCOUNTER — PATIENT OUTREACH (OUTPATIENT)
Dept: ADMINISTRATIVE | Facility: CLINIC | Age: 58
End: 2023-06-13
Payer: COMMERCIAL

## 2023-06-13 NOTE — PROGRESS NOTES
C3 nurse attempted to contact Vicky Alvarado  for a TCC post hospital discharge follow up call. No answer. Left voicemail with callback information. The patient does not have a scheduled HOSFU appointment. Message sent to PCP staff for assistance with scheduling visit with patient.

## 2023-06-14 NOTE — PROGRESS NOTES
2nd Attempt made to reach patient for TCC call. Left voicemail please call [phone] leave first name, last name, and  Aminata will return your call.

## 2023-06-15 NOTE — PROGRESS NOTES
3rd Attempt made to reach patient for TCC call. Left voicemail please call [phone] leave first name, last name, and  Aminata will return your call.

## 2023-06-26 ENCOUNTER — LAB VISIT (OUTPATIENT)
Dept: LAB | Facility: HOSPITAL | Age: 58
End: 2023-06-26
Attending: INTERNAL MEDICINE
Payer: COMMERCIAL

## 2023-06-26 DIAGNOSIS — D64.9 NORMOCYTIC ANEMIA: ICD-10-CM

## 2023-06-26 LAB
ALBUMIN SERPL BCP-MCNC: 3.3 G/DL (ref 3.5–5.2)
ALP SERPL-CCNC: 335 U/L (ref 55–135)
ALT SERPL W/O P-5'-P-CCNC: 16 U/L (ref 10–44)
ANION GAP SERPL CALC-SCNC: 18 MMOL/L (ref 8–16)
AST SERPL-CCNC: 70 U/L (ref 10–40)
BASOPHILS # BLD AUTO: 0.08 K/UL (ref 0–0.2)
BASOPHILS NFR BLD: 1 % (ref 0–1.9)
BILIRUB SERPL-MCNC: 1.2 MG/DL (ref 0.1–1)
BUN SERPL-MCNC: 13 MG/DL (ref 6–20)
CALCIUM SERPL-MCNC: 9.9 MG/DL (ref 8.7–10.5)
CHLORIDE SERPL-SCNC: 99 MMOL/L (ref 95–110)
CO2 SERPL-SCNC: 23 MMOL/L (ref 23–29)
CREAT SERPL-MCNC: 1 MG/DL (ref 0.5–1.4)
DIFFERENTIAL METHOD: ABNORMAL
EOSINOPHIL # BLD AUTO: 0.2 K/UL (ref 0–0.5)
EOSINOPHIL NFR BLD: 3 % (ref 0–8)
ERYTHROCYTE [DISTWIDTH] IN BLOOD BY AUTOMATED COUNT: 17.2 % (ref 11.5–14.5)
EST. GFR  (NO RACE VARIABLE): >60 ML/MIN/1.73 M^2
FERRITIN SERPL-MCNC: 181 NG/ML (ref 20–300)
GLUCOSE SERPL-MCNC: 116 MG/DL (ref 70–110)
HCT VFR BLD AUTO: 42.4 % (ref 37–48.5)
HGB BLD-MCNC: 13.9 G/DL (ref 12–16)
IMM GRANULOCYTES # BLD AUTO: 0.02 K/UL (ref 0–0.04)
IMM GRANULOCYTES NFR BLD AUTO: 0.3 % (ref 0–0.5)
IRON SERPL-MCNC: 63 UG/DL (ref 30–160)
LYMPHOCYTES # BLD AUTO: 1.1 K/UL (ref 1–4.8)
LYMPHOCYTES NFR BLD: 13.3 % (ref 18–48)
MAGNESIUM SERPL-MCNC: 1.5 MG/DL (ref 1.6–2.6)
MCH RBC QN AUTO: 31.1 PG (ref 27–31)
MCHC RBC AUTO-ENTMCNC: 32.8 G/DL (ref 32–36)
MCV RBC AUTO: 95 FL (ref 82–98)
MONOCYTES # BLD AUTO: 0.5 K/UL (ref 0.3–1)
MONOCYTES NFR BLD: 6.9 % (ref 4–15)
NEUTROPHILS # BLD AUTO: 5.9 K/UL (ref 1.8–7.7)
NEUTROPHILS NFR BLD: 75.5 % (ref 38–73)
NRBC BLD-RTO: 0 /100 WBC
PHOSPHATE SERPL-MCNC: 4 MG/DL (ref 2.7–4.5)
PLATELET # BLD AUTO: 217 K/UL (ref 150–450)
PMV BLD AUTO: 10.7 FL (ref 9.2–12.9)
POTASSIUM SERPL-SCNC: 4 MMOL/L (ref 3.5–5.1)
PROT SERPL-MCNC: 7.7 G/DL (ref 6–8.4)
RBC # BLD AUTO: 4.47 M/UL (ref 4–5.4)
RETICS/RBC NFR AUTO: 1.8 % (ref 0.5–2.5)
SATURATED IRON: 20 % (ref 20–50)
SODIUM SERPL-SCNC: 140 MMOL/L (ref 136–145)
TOTAL IRON BINDING CAPACITY: 323 UG/DL (ref 250–450)
TRANSFERRIN SERPL-MCNC: 218 MG/DL (ref 200–375)
WBC # BLD AUTO: 7.87 K/UL (ref 3.9–12.7)

## 2023-06-26 PROCEDURE — 85025 COMPLETE CBC W/AUTO DIFF WBC: CPT | Performed by: INTERNAL MEDICINE

## 2023-06-26 PROCEDURE — 36415 COLL VENOUS BLD VENIPUNCTURE: CPT | Performed by: INTERNAL MEDICINE

## 2023-06-26 PROCEDURE — 84466 ASSAY OF TRANSFERRIN: CPT | Performed by: INTERNAL MEDICINE

## 2023-06-26 PROCEDURE — 83735 ASSAY OF MAGNESIUM: CPT | Performed by: INTERNAL MEDICINE

## 2023-06-26 PROCEDURE — 84100 ASSAY OF PHOSPHORUS: CPT | Performed by: INTERNAL MEDICINE

## 2023-06-26 PROCEDURE — 82728 ASSAY OF FERRITIN: CPT | Performed by: INTERNAL MEDICINE

## 2023-06-26 PROCEDURE — 85045 AUTOMATED RETICULOCYTE COUNT: CPT | Performed by: INTERNAL MEDICINE

## 2023-06-26 PROCEDURE — 80053 COMPREHEN METABOLIC PANEL: CPT | Performed by: INTERNAL MEDICINE

## 2023-06-29 ENCOUNTER — PATIENT MESSAGE (OUTPATIENT)
Dept: ELECTROPHYSIOLOGY | Facility: CLINIC | Age: 58
End: 2023-06-29
Payer: COMMERCIAL

## 2023-07-03 DIAGNOSIS — I49.8 OTHER SPECIFIED CARDIAC ARRHYTHMIAS: Primary | ICD-10-CM

## 2023-07-04 ENCOUNTER — PATIENT MESSAGE (OUTPATIENT)
Dept: HEMATOLOGY/ONCOLOGY | Facility: CLINIC | Age: 58
End: 2023-07-04
Payer: COMMERCIAL

## 2023-07-06 ENCOUNTER — PATIENT MESSAGE (OUTPATIENT)
Dept: BARIATRICS | Facility: CLINIC | Age: 58
End: 2023-07-06
Payer: COMMERCIAL

## 2023-07-06 DIAGNOSIS — E11.42 TYPE 2 DIABETES MELLITUS WITH DIABETIC POLYNEUROPATHY, WITHOUT LONG-TERM CURRENT USE OF INSULIN: Primary | ICD-10-CM

## 2023-07-06 DIAGNOSIS — E66.01 CLASS 3 SEVERE OBESITY DUE TO EXCESS CALORIES WITH SERIOUS COMORBIDITY AND BODY MASS INDEX (BMI) OF 45.0 TO 49.9 IN ADULT: ICD-10-CM

## 2023-07-11 ENCOUNTER — OFFICE VISIT (OUTPATIENT)
Dept: SPORTS MEDICINE | Facility: CLINIC | Age: 58
End: 2023-07-11
Payer: COMMERCIAL

## 2023-07-11 ENCOUNTER — HOSPITAL ENCOUNTER (OUTPATIENT)
Dept: RADIOLOGY | Facility: HOSPITAL | Age: 58
Discharge: HOME | End: 2023-07-11
Attending: STUDENT IN AN ORGANIZED HEALTH CARE EDUCATION/TRAINING PROGRAM
Payer: COMMERCIAL

## 2023-07-11 VITALS
SYSTOLIC BLOOD PRESSURE: 112 MMHG | BODY MASS INDEX: 48.37 KG/M2 | DIASTOLIC BLOOD PRESSURE: 70 MMHG | HEIGHT: 63 IN | HEART RATE: 88 BPM | WEIGHT: 273 LBS

## 2023-07-11 DIAGNOSIS — M25.562 PAIN IN BOTH KNEES, UNSPECIFIED CHRONICITY: ICD-10-CM

## 2023-07-11 DIAGNOSIS — M25.561 PAIN IN BOTH KNEES, UNSPECIFIED CHRONICITY: ICD-10-CM

## 2023-07-11 DIAGNOSIS — M17.0 BILATERAL PRIMARY OSTEOARTHRITIS OF KNEE: Primary | ICD-10-CM

## 2023-07-11 PROCEDURE — 73564 XR KNEE ORTHO BILAT WITH FLEXION: ICD-10-PCS | Mod: 26,50,, | Performed by: RADIOLOGY

## 2023-07-11 PROCEDURE — 3078F PR MOST RECENT DIASTOLIC BLOOD PRESSURE < 80 MM HG: ICD-10-PCS | Mod: CPTII,S$GLB,, | Performed by: STUDENT IN AN ORGANIZED HEALTH CARE EDUCATION/TRAINING PROGRAM

## 2023-07-11 PROCEDURE — 1160F RVW MEDS BY RX/DR IN RCRD: CPT | Mod: CPTII,S$GLB,, | Performed by: STUDENT IN AN ORGANIZED HEALTH CARE EDUCATION/TRAINING PROGRAM

## 2023-07-11 PROCEDURE — 73564 X-RAY EXAM KNEE 4 OR MORE: CPT | Mod: 26,50,, | Performed by: RADIOLOGY

## 2023-07-11 PROCEDURE — 3072F PR LOW RISK FOR RETINOPATHY: ICD-10-PCS | Mod: CPTII,S$GLB,, | Performed by: STUDENT IN AN ORGANIZED HEALTH CARE EDUCATION/TRAINING PROGRAM

## 2023-07-11 PROCEDURE — 1111F PR DISCHARGE MEDS RECONCILED W/ CURRENT OUTPATIENT MED LIST: ICD-10-PCS | Mod: CPTII,S$GLB,, | Performed by: STUDENT IN AN ORGANIZED HEALTH CARE EDUCATION/TRAINING PROGRAM

## 2023-07-11 PROCEDURE — 3074F SYST BP LT 130 MM HG: CPT | Mod: CPTII,S$GLB,, | Performed by: STUDENT IN AN ORGANIZED HEALTH CARE EDUCATION/TRAINING PROGRAM

## 2023-07-11 PROCEDURE — 99214 PR OFFICE/OUTPT VISIT, EST, LEVL IV, 30-39 MIN: ICD-10-PCS | Mod: S$GLB,,, | Performed by: STUDENT IN AN ORGANIZED HEALTH CARE EDUCATION/TRAINING PROGRAM

## 2023-07-11 PROCEDURE — 3008F BODY MASS INDEX DOCD: CPT | Mod: CPTII,S$GLB,, | Performed by: STUDENT IN AN ORGANIZED HEALTH CARE EDUCATION/TRAINING PROGRAM

## 2023-07-11 PROCEDURE — 73564 X-RAY EXAM KNEE 4 OR MORE: CPT | Mod: TC,50

## 2023-07-11 PROCEDURE — 4010F ACE/ARB THERAPY RXD/TAKEN: CPT | Mod: CPTII,S$GLB,, | Performed by: STUDENT IN AN ORGANIZED HEALTH CARE EDUCATION/TRAINING PROGRAM

## 2023-07-11 PROCEDURE — 3072F LOW RISK FOR RETINOPATHY: CPT | Mod: CPTII,S$GLB,, | Performed by: STUDENT IN AN ORGANIZED HEALTH CARE EDUCATION/TRAINING PROGRAM

## 2023-07-11 PROCEDURE — 4010F PR ACE/ARB THEARPY RXD/TAKEN: ICD-10-PCS | Mod: CPTII,S$GLB,, | Performed by: STUDENT IN AN ORGANIZED HEALTH CARE EDUCATION/TRAINING PROGRAM

## 2023-07-11 PROCEDURE — 1159F PR MEDICATION LIST DOCUMENTED IN MEDICAL RECORD: ICD-10-PCS | Mod: CPTII,S$GLB,, | Performed by: STUDENT IN AN ORGANIZED HEALTH CARE EDUCATION/TRAINING PROGRAM

## 2023-07-11 PROCEDURE — 99214 OFFICE O/P EST MOD 30 MIN: CPT | Mod: S$GLB,,, | Performed by: STUDENT IN AN ORGANIZED HEALTH CARE EDUCATION/TRAINING PROGRAM

## 2023-07-11 PROCEDURE — 99999 PR PBB SHADOW E&M-EST. PATIENT-LVL III: CPT | Mod: PBBFAC,,, | Performed by: STUDENT IN AN ORGANIZED HEALTH CARE EDUCATION/TRAINING PROGRAM

## 2023-07-11 PROCEDURE — 3008F PR BODY MASS INDEX (BMI) DOCUMENTED: ICD-10-PCS | Mod: CPTII,S$GLB,, | Performed by: STUDENT IN AN ORGANIZED HEALTH CARE EDUCATION/TRAINING PROGRAM

## 2023-07-11 PROCEDURE — 3078F DIAST BP <80 MM HG: CPT | Mod: CPTII,S$GLB,, | Performed by: STUDENT IN AN ORGANIZED HEALTH CARE EDUCATION/TRAINING PROGRAM

## 2023-07-11 PROCEDURE — 99999 PR PBB SHADOW E&M-EST. PATIENT-LVL III: ICD-10-PCS | Mod: PBBFAC,,, | Performed by: STUDENT IN AN ORGANIZED HEALTH CARE EDUCATION/TRAINING PROGRAM

## 2023-07-11 PROCEDURE — 3074F PR MOST RECENT SYSTOLIC BLOOD PRESSURE < 130 MM HG: ICD-10-PCS | Mod: CPTII,S$GLB,, | Performed by: STUDENT IN AN ORGANIZED HEALTH CARE EDUCATION/TRAINING PROGRAM

## 2023-07-11 PROCEDURE — 1111F DSCHRG MED/CURRENT MED MERGE: CPT | Mod: CPTII,S$GLB,, | Performed by: STUDENT IN AN ORGANIZED HEALTH CARE EDUCATION/TRAINING PROGRAM

## 2023-07-11 PROCEDURE — 1160F PR REVIEW ALL MEDS BY PRESCRIBER/CLIN PHARMACIST DOCUMENTED: ICD-10-PCS | Mod: CPTII,S$GLB,, | Performed by: STUDENT IN AN ORGANIZED HEALTH CARE EDUCATION/TRAINING PROGRAM

## 2023-07-11 PROCEDURE — 1159F MED LIST DOCD IN RCRD: CPT | Mod: CPTII,S$GLB,, | Performed by: STUDENT IN AN ORGANIZED HEALTH CARE EDUCATION/TRAINING PROGRAM

## 2023-07-11 NOTE — PROGRESS NOTES
Subjective:          Chief Complaint: Vicky Alvarado is a 58 y.o. female who had concerns including Pain of the Right Knee and Pain of the Left Knee.    Vicky Alvarado is a 58 y.o. female that presents for evaluation for her bilateral knee pain. She notes that her pain started about 18 years ago with no specific injury or trauma. She notes that her left is worse then the right. She complains of fatigue in her bilateral knees with activity. She notes having a popping and crunching sensation as well. She notes having increased pain at night and difficulty sleeping. She states that she has received corticosteroid injections in the past, however this was more then 15 years ago. She did receive good relief with these.  The pain is located mainly laterally and anteriorly.  She is also noticed a progressive knock-knee deformity.    Past Medical History:   Diagnosis Date    Anticoagulant long-term use     Arthritis     Atrial fibrillation     cardioversion    Atrial fibrillation with RVR     Avascular necrosis     L hand    CHF (congestive heart failure)     Depression     Diabetes mellitus     Encounter for blood transfusion 07/22/2020    Encounter for blood transfusion 03/2022    Fatty liver     GERD (gastroesophageal reflux disease)     Hx of psychiatric care     Hypertension     Iron deficiency anemia 05/07/2022    TIBC 444 with saturated iron 8    Liver disease     Obese     Pre-diabetes 05/07/2022    Psychiatric problem     Sleep apnea     wears cpap       Current Outpatient Medications on File Prior to Visit   Medication Sig Dispense Refill    albuterol (VENTOLIN HFA) 90 mcg/actuation inhaler Inhale 2 puffs into the lungs every 6 (six) hours as needed for Wheezing. Rescue 18 g 0    ALPRAZolam (XANAX) 0.5 MG tablet Take 1 tablet (0.5 mg total) by mouth 2 (two) times daily as needed for Anxiety. 60 tablet 4    apixaban (ELIQUIS) 5 mg Tab Take 1 tablet (5 mg total) by mouth 2 (two) times daily. 90 tablet  5    atorvastatin (LIPITOR) 80 MG tablet Take 1 tablet (80 mg total) by mouth once daily. 90 tablet 3    b complex vitamins capsule Take 1 capsule by mouth once daily.      colchicine (COLCRYS) 0.6 mg tablet For gout attack: Take TWO tablets by mouth, then, 2 hours later, take ONE additional tablet. Then take 1 tablet twice daily only if still needed for gout pain. 20 tablet 4    DULoxetine (CYMBALTA) 60 MG capsule Take 2 capsules (120 mg total) by mouth once daily. 180 capsule 3    ferrous sulfate (SLOW RELEASE IRON) 142 mg (45 mg iron) TbSR Take 1 tablet (142 mg total) by mouth once daily. FOR 7 DAY THEN INCREASE TO TWICE DAILY AS TOLERATED      furosemide (LASIX) 40 MG tablet Take 1 tablet (40 mg total) by mouth 2 (two) times daily. Resume as needed for edema until followup with your PCP. 60 tablet 11    gluc-shikha-[facility]#[id number] 750 mg-644 mg- 30 mg-1 mg Tab Take 1 tablet by mouth once daily.       ibuprofen (ADVIL,MOTRIN) 600 MG tablet Take 1 tablet (600 mg total) by mouth every 6 (six) hours as needed for Pain. 30 tablet 1    krill/om-3/dha/epa/phospho/ast (MEGARED OMEGA-3 KRILL OIL ORAL) Take 1 capsule by mouth once daily.      lisinopriL (PRINIVIL,ZESTRIL) 40 MG tablet Take 1 tablet (40 mg total) by mouth once daily. 90 tablet 3    melatonin 10 mg Tab Take 1-2 tablets by mouth nightly as needed (Sleep).      metoprolol succinate (TOPROL-XL) 50 MG 24 hr tablet Take 4 tablets (200 mg total) by mouth once daily. 180 tablet 3    multivitamin (THERAGRAN) per tablet Take 1 tablet by mouth once daily.      NIFEdipine (PROCARDIA-XL) 90 MG (OSM) 24 hr tablet Take 1 tablet (90 mg total) by mouth once daily. HOLD UNTIL FOLLOW-UP WITH YOUR PCP. 30 tablet 11    omeprazole (PRILOSEC) 20 MG capsule TAKE 1 CAPSULE BY MOUTH ONCE DAILY 90 capsule 3    oxyCODONE-acetaminophen (PERCOCET) 7.5-325 mg per tablet Take 1 tablet by mouth every 8 (eight) hours as needed for Pain. 20 tablet 0    potassium chloride SA  (K-DUR,KLOR-CON) 20 MEQ tablet Take 1 tablet (20 mEq total) by mouth once daily. ONLY TAKE WITH LASIX. 30 tablet 11    predniSONE (DELTASONE) 10 MG tablet Take 1 tablet by mouth daily 10 tablet 0    propafenone (RYTHMOL) 225 MG Tab Take 1 tablet (225 mg total) by mouth every 8 (eight) hours. 90 tablet 11    SENNA 8.6 mg tablet Take 1 tablet by mouth 2 (two) times daily. (Patient taking differently: Take 1 tablet by mouth 2 (two) times daily. prn) 30 tablet 3    tirzepatide 10 mg/0.5 mL PnIj Inject 10 mg into the skin every 7 days. 4 pen 2    traZODone (DESYREL) 100 MG tablet Take 1 tablet (100 mg total) by mouth nightly as needed for Insomnia. 90 tablet 3    walker (ULTRA-LIGHT ROLLATOR) Misc As directed 1 each 0    [DISCONTINUED] medroxyPROGESTERone (PROVERA) 10 MG tablet Take 2 tablets (20 mg total) by mouth 3 (three) times daily. 180 tablet 0    [DISCONTINUED] pantoprazole (PROTONIX) 40 MG tablet Take 1 tablet (40 mg total) by mouth once daily. (Patient not taking: Reported on 2/18/2022) 30 tablet 0     Current Facility-Administered Medications on File Prior to Visit   Medication Dose Route Frequency Provider Last Rate Last Admin    sodium chloride 0.9% bolus 1,000 mL  1,000 mL Intravenous Once Laurie Montgomery NP           Past Surgical History:   Procedure Laterality Date    ABLATION OF ARRHYTHMOGENIC FOCUS FOR ATRIAL FIBRILLATION N/A 7/20/2020    Procedure: Ablation atrial fibrillation;  Surgeon: Gabriel Hawley MD;  Location: [facility] EP LAB;  Service: Cardiology;  Laterality: N/A;  afib, PVI, RFA, REENA, SHADY, anes, MB, 3 Prep    ABLATION OF ARRHYTHMOGENIC FOCUS FOR ATRIAL FIBRILLATION N/A 1/24/2022    Procedure: Ablation atrial fibrillation;  Surgeon: Gabriel Hawley MD;  Location: [facility] EP LAB;  Service: Cardiology;  Laterality: N/A;  afib/afl, PVI (re-do)/CTI, RFA, REENA (cx if SR), SHADY, anes, MB, 3 Prep    APPLICATION OF WOUND VACUUM-ASSISTED CLOSURE DEVICE Left 8/3/2020    Procedure: APPLICATION, WOUND  VAC;  Surgeon: SEMAJ Márquez III, MD;  Location: [facility] OR 2ND FLR;  Service: Peripheral Vascular;  Laterality: Left;    APPLICATION OF WOUND VACUUM-ASSISTED CLOSURE DEVICE Left 8/6/2020    Procedure: APPLICATION, WOUND VAC;  Surgeon: SEMAJ Márquez III, MD;  Location: [facility] OR 2ND FLR;  Service: Peripheral Vascular;  Laterality: Left;    CARPAL TUNNEL RELEASE Right 6/10/2020    Procedure: RELEASE, CARPAL TUNNEL - RIGHT;  Surgeon: Adelaida Hall MD;  Location: [facility] OR;  Service: Orthopedics;  Laterality: Right;  GENERAL AND REGIONAL    CARPAL TUNNEL RELEASE Left 5/5/2021    Procedure: RELEASE, CARPAL TUNNEL, LEFT;  Surgeon: Adelaida Hall MD;  Location: [facility] OR;  Service: Orthopedics;  Laterality: Left;  GENERAL & REGIONAL IN PACU    CLOSURE OF WOUND Left 8/6/2020    Procedure: CLOSURE, WOUND;  Surgeon: SEMAJ Márquez III, MD;  Location: [facility] OR [facility] FLR;  Service: Peripheral Vascular;  Laterality: Left;  Complex    COLONOSCOPY N/A 8/17/2021    Procedure: COLONOSCOPY Suprep;  Surgeon: Loco Deluca MD;  Location: [facility];  Service: Endoscopy;  Laterality: N/A;    ESOPHAGOGASTRODUODENOSCOPY N/A 8/17/2021    Procedure: EGD (ESOPHAGOGASTRODUODENOSCOPY);  Surgeon: Loco Deluca MD;  Location: [facility];  Service: Endoscopy;  Laterality: N/A;  Patient is schedule to have her Covid test on 08/14/2021 at the [facility] @ 9:30am. [place].    EXPLORATION OF FEMORAL ARTERY Left 7/21/2020    Procedure: EXPLORATION, ARTERY, FEMORAL;  Surgeon: SEMAJ Márquez III, MD;  Location: [facility] OR 2ND FLR;  Service: Peripheral Vascular;  Laterality: Left;  1. Urgent Direct repair, L SFA branch laceration    FOOT SURGERY      HYSTEROSCOPY WITH DILATION AND CURETTAGE OF UTERUS N/A 2/19/2022    Procedure: HYSTEROSCOPY, WITH DILATION AND CURETTAGE OF UTERUS;  Surgeon: Shane Palomo MD;  [facility];  Service: OB/GYN;  Laterality: N/A;    INCISION AND DRAINAGE OF KNEE Left 5/12/2022     Procedure: INCISION AND DRAINAGE, Prepatellar bursectomy.;  Surgeon: Dre Guzmán MD;  Location: [facility] 2ND FLR;  Service: Orthopedics;  Laterality: Left;    LEFT HEART CATHETERIZATION Left 2/7/2023    Procedure: Left heart cath;  Surgeon: Josiah Terry MD;  Location: [facility] CATH LAB;  Service: Cardiology;  Laterality: Left;  borderline moderate bleeding risk 6.3%    OCCLUSION OF LEFT ATRIAL APPENDAGE N/A 6/12/2023    Procedure: Left atrial appendage occlusion;  Surgeon: Gabriel Hawley MD;  Location: [facility] EP LAB;  Service: Cardiology;  Laterality: N/A;  afib, watchman, BSCI, demi, ALIYA ibarra, 3prep    ROBOT-ASSISTED LAPAROSCOPIC ABDOMINAL HYSTERECTOMY USING DA KEIRY XI N/A 8/9/2022    Procedure: XI ROBOTIC HYSTERECTOMY;  Surgeon: Yolanda Barriga MD;  Location: [facility];  Service: OB/GYN;  Laterality: N/A;  dual console requested    ROBOT-ASSISTED LAPAROSCOPIC SALPINGO-OOPHORECTOMY Bilateral 8/9/2022    Procedure: ROBOTIC SALPINGO-OOPHORECTOMY;  Surgeon: Yolanda Barriga MD;  Location: [facility];  Service: OB/GYN;  Laterality: Bilateral;    TRANSESOPHAGEAL ECHOCARDIOGRAPHY N/A 6/12/2023    Procedure: ECHOCARDIOGRAM, TRANSESOPHAGEAL;  Surgeon: Cyril Mast MD;  Location: [facility] EP LAB;  Service: Cardiology;  Laterality: N/A;    TREATMENT OF CARDIAC ARRHYTHMIA N/A 1/29/2020    Procedure: CARDIOVERSION;  Surgeon: Gabriel Hawley MD;  Location: [facility] EP LAB;  Service: Cardiology;  Laterality: N/A;  af, demi, dccv, fred mb, 345    TREATMENT OF CARDIAC ARRHYTHMIA  1/24/2022    Procedure: Cardioversion or Defibrillation;  Surgeon: Gabriel Hawley MD;  Location: [facility] EP LAB;  Service: Cardiology;;    WOUND DEBRIDEMENT Left 8/6/2020    Procedure: DEBRIDEMENT, WOUND;  Surgeon: SEMAJ Márquez III, MD;  Location: [facility] 2ND FLR;  Service: Peripheral Vascular;  Laterality: Left;       Family History   Problem Relation Age of Onset    Cataracts Mother     Hypertension Mother     Thyroid disease Mother      Diabetes Father     Hypertension Father     Hypertension Sister     Hypertension Brother     Amblyopia Neg Hx     Blindness Neg Hx     Cancer Neg Hx     Glaucoma Neg Hx     Macular degeneration Neg Hx     Retinal detachment Neg Hx     Strabismus Neg Hx     Stroke Neg Hx     Breast cancer Neg Hx     Colon cancer Neg Hx     Ovarian cancer Neg Hx        Social History     Socioeconomic History    Marital status:    Tobacco Use    Smoking status: Former     Types: Cigarettes     Quit date: 2019     Years since quittin.0    Smokeless tobacco: Never   Substance and Sexual Activity    Alcohol use: No    Drug use: No    Sexual activity: Not Currently     Partners: Male     Birth control/protection: See Surgical Hx      Review of Systems   Constitutional: Negative.   HENT: Negative.     Eyes: Negative.    Cardiovascular: Negative.    Respiratory: Negative.     Endocrine: Negative.    Hematologic/Lymphatic: Negative.    Skin: Negative.    Musculoskeletal:  Positive for arthritis (bilateral knee), joint pain (bilateral knee), joint swelling, muscle weakness and stiffness. Negative for falls, muscle cramps, myalgias and neck pain.   Neurological: Negative.    Psychiatric/Behavioral: Negative.     Allergic/Immunologic: Negative.                  Objective:        General: Vicky is well-developed, well-nourished, appears stated age, in no acute distress, alert and oriented to time, place and person.     General    Nursing note and vitals reviewed.  Constitutional: She is oriented to person, place, and time. She appears well-developed and well-nourished. No distress.   HENT:   Head: Normocephalic and atraumatic.   Nose: Nose normal.   Eyes: EOM are normal.   Cardiovascular:  Intact distal pulses.            Pulmonary/Chest: Effort normal. No respiratory distress.   Neurological: She is alert and oriented to person, place, and time.   Psychiatric: She has a normal mood and affect. Her behavior is normal. Judgment  and thought content normal.           Right Knee Exam     Inspection   Erythema: absent  Scars: absent  Swelling: absent  Effusion: absent  Deformity: present (Valgus)  Bruising: absent    Tenderness   The patient is tender to palpation of the medial joint line, lateral joint line and patella.    Crepitus   The patient has crepitus of the lateral joint line, medial joint line and patella.    Range of Motion   Extension:  5   Flexion:  120     Tests   Meniscus   Mer:  Medial - negative Lateral - negative  Ligament Examination   Lachman: normal (-1 to 2mm)   PCL-Posterior Drawer: normal (0 to 2mm)     MCL - Valgus: normal (0 to 2mm)  LCL - Varus: normal  Patella   Passive Patellar Tilt: neutral  Patellar Tracking: normal  Patellar Grind: positive    Other   Sensation: normal    Left Knee Exam     Inspection   Erythema: absent  Scars: absent  Swelling: absent  Effusion: absent  Deformity: present (valgus)  Bruising: absent    Tenderness   The patient tender to palpation of the medial joint line, lateral joint line and patella.    Crepitus   The patient has crepitus of the lateral joint line, medial joint line and patella.    Range of Motion   Extension:  5   Flexion:  130     Tests   Meniscus   Mer:  Medial - negative Lateral - negative  Stability   Lachman: normal (-1 to 2mm)   PCL-Posterior Drawer: normal (0 to 2mm)  MCL - Valgus: normal (0 to 2mm)  LCL - Varus: normal (0 to 2mm)  Patella   Patellar apprehension: negative  Passive Patellar Tilt: neutral  Patellar Tracking: normal  Patellar Grind: positive    Other   Sensation: normal    Muscle Strength   Right Lower Extremity   Quadriceps:  5/5   Hamstrin/5   Left Lower Extremity   Quadriceps:  5/5   Hamstrin/5     Vascular Exam     Right Pulses  Dorsalis Pedis:      2+  Posterior Tibial:      2+        Left Pulses  Dorsalis Pedis:      2+  Posterior Tibial:      2+        Edema  Right Lower Leg: absent  Left Lower Leg: absent    Imaging:  X-rays  of bilateral knees from 07/11/2023 personally viewed by me on that day.  These include weight-bearing AP, PA flexion, lateral and Merchant views.  Joint spaces show severe arthritic changes with valgus collapse of the bilateral knees.  There is complete loss of joint space and large osteophyte formation both in the bilateral lateral and patellofemoral compartments.  Kellgren Pancho grade 4.        Assessment:     Vicky Alvarado is a 58 y.o. female with bilateral knee osteoarthritis  Encounter Diagnosis   Name Primary?    Bilateral primary osteoarthritis of knee Yes          Plan:       The diagnosis treatment options with the patient all her questions were answered.  I showed her the x-rays and reviewed the findings with her.  We discussed with her the treatment options for knee osteoarthritis.  She is currently taking anti-inflammatories over-the-counter Tylenol medications.  These do provide some relief.  She is interested in potential knee arthroplasty.  I will refer her to the arthroplasty department for evaluation.

## 2023-07-18 ENCOUNTER — HOSPITAL ENCOUNTER (OUTPATIENT)
Dept: RADIOLOGY | Facility: HOSPITAL | Age: 58
Discharge: HOME | End: 2023-07-18
Attending: OBSTETRICS & GYNECOLOGY
Payer: COMMERCIAL

## 2023-07-18 DIAGNOSIS — Z12.31 ENCOUNTER FOR SCREENING MAMMOGRAM FOR MALIGNANT NEOPLASM OF BREAST: ICD-10-CM

## 2023-07-18 PROCEDURE — 77063 MAMMO DIGITAL SCREENING BILAT WITH TOMO: ICD-10-PCS | Mod: 26,,, | Performed by: RADIOLOGY

## 2023-07-18 PROCEDURE — 77067 MAMMO DIGITAL SCREENING BILAT WITH TOMO: ICD-10-PCS | Mod: 26,,, | Performed by: RADIOLOGY

## 2023-07-18 PROCEDURE — 77067 SCR MAMMO BI INCL CAD: CPT | Mod: TC

## 2023-07-18 PROCEDURE — 77067 SCR MAMMO BI INCL CAD: CPT | Mod: 26,,, | Performed by: RADIOLOGY

## 2023-07-18 PROCEDURE — 77063 BREAST TOMOSYNTHESIS BI: CPT | Mod: 26,,, | Performed by: RADIOLOGY

## 2023-07-20 ENCOUNTER — ANESTHESIA EVENT (OUTPATIENT)
Dept: MEDSURG UNIT | Facility: HOSPITAL | Age: 58
End: 2023-07-20
Payer: COMMERCIAL

## 2023-07-21 ENCOUNTER — OFFICE VISIT (OUTPATIENT)
Dept: ORTHOPEDICS | Facility: CLINIC | Age: 58
End: 2023-07-21
Payer: COMMERCIAL

## 2023-07-21 VITALS — HEIGHT: 63 IN | WEIGHT: 256.06 LBS | BODY MASS INDEX: 45.37 KG/M2

## 2023-07-21 DIAGNOSIS — M17.0 PRIMARY OSTEOARTHRITIS OF BOTH KNEES: Primary | ICD-10-CM

## 2023-07-21 PROCEDURE — 99999 PR PBB SHADOW E&M-EST. PATIENT-LVL III: CPT | Mod: PBBFAC,,, | Performed by: ORTHOPAEDIC SURGERY

## 2023-07-21 PROCEDURE — 4010F ACE/ARB THERAPY RXD/TAKEN: CPT | Mod: CPTII,S$GLB,, | Performed by: ORTHOPAEDIC SURGERY

## 2023-07-21 PROCEDURE — 4010F PR ACE/ARB THEARPY RXD/TAKEN: ICD-10-PCS | Mod: CPTII,S$GLB,, | Performed by: ORTHOPAEDIC SURGERY

## 2023-07-21 PROCEDURE — 99203 OFFICE O/P NEW LOW 30 MIN: CPT | Mod: S$GLB,,, | Performed by: ORTHOPAEDIC SURGERY

## 2023-07-21 PROCEDURE — 1160F RVW MEDS BY RX/DR IN RCRD: CPT | Mod: CPTII,S$GLB,, | Performed by: ORTHOPAEDIC SURGERY

## 2023-07-21 PROCEDURE — 3072F PR LOW RISK FOR RETINOPATHY: ICD-10-PCS | Mod: CPTII,S$GLB,, | Performed by: ORTHOPAEDIC SURGERY

## 2023-07-21 PROCEDURE — 99203 PR OFFICE/OUTPT VISIT, NEW, LEVL III, 30-44 MIN: ICD-10-PCS | Mod: S$GLB,,, | Performed by: ORTHOPAEDIC SURGERY

## 2023-07-21 PROCEDURE — 3008F PR BODY MASS INDEX (BMI) DOCUMENTED: ICD-10-PCS | Mod: CPTII,S$GLB,, | Performed by: ORTHOPAEDIC SURGERY

## 2023-07-21 PROCEDURE — 3072F LOW RISK FOR RETINOPATHY: CPT | Mod: CPTII,S$GLB,, | Performed by: ORTHOPAEDIC SURGERY

## 2023-07-21 PROCEDURE — 99999 PR PBB SHADOW E&M-EST. PATIENT-LVL III: ICD-10-PCS | Mod: PBBFAC,,, | Performed by: ORTHOPAEDIC SURGERY

## 2023-07-21 PROCEDURE — 3008F BODY MASS INDEX DOCD: CPT | Mod: CPTII,S$GLB,, | Performed by: ORTHOPAEDIC SURGERY

## 2023-07-21 PROCEDURE — 1160F PR REVIEW ALL MEDS BY PRESCRIBER/CLIN PHARMACIST DOCUMENTED: ICD-10-PCS | Mod: CPTII,S$GLB,, | Performed by: ORTHOPAEDIC SURGERY

## 2023-07-21 PROCEDURE — 1159F MED LIST DOCD IN RCRD: CPT | Mod: CPTII,S$GLB,, | Performed by: ORTHOPAEDIC SURGERY

## 2023-07-21 PROCEDURE — 1159F PR MEDICATION LIST DOCUMENTED IN MEDICAL RECORD: ICD-10-PCS | Mod: CPTII,S$GLB,, | Performed by: ORTHOPAEDIC SURGERY

## 2023-07-24 ENCOUNTER — HOSPITAL ENCOUNTER (OUTPATIENT)
Facility: HOSPITAL | Age: 58
Discharge: HOME | End: 2023-07-24
Attending: INTERNAL MEDICINE | Admitting: INTERNAL MEDICINE
Payer: COMMERCIAL

## 2023-07-24 ENCOUNTER — ANESTHESIA (OUTPATIENT)
Dept: MEDSURG UNIT | Facility: HOSPITAL | Age: 58
End: 2023-07-24
Payer: COMMERCIAL

## 2023-07-24 ENCOUNTER — HOSPITAL ENCOUNTER (OUTPATIENT)
Dept: CARDIOLOGY | Facility: HOSPITAL | Age: 58
Discharge: HOME | End: 2023-07-24
Attending: INTERNAL MEDICINE | Admitting: INTERNAL MEDICINE
Payer: COMMERCIAL

## 2023-07-24 VITALS
SYSTOLIC BLOOD PRESSURE: 86 MMHG | BODY MASS INDEX: 43.54 KG/M2 | OXYGEN SATURATION: 94 % | DIASTOLIC BLOOD PRESSURE: 54 MMHG | WEIGHT: 255 LBS | RESPIRATION RATE: 46 BRPM | DIASTOLIC BLOOD PRESSURE: 54 MMHG | HEIGHT: 64 IN | HEART RATE: 64 BPM | SYSTOLIC BLOOD PRESSURE: 89 MMHG | BODY MASS INDEX: 43.54 KG/M2 | WEIGHT: 255 LBS | HEIGHT: 64 IN | TEMPERATURE: 98 F | HEART RATE: 67 BPM

## 2023-07-24 DIAGNOSIS — I48.0 PAF (PAROXYSMAL ATRIAL FIBRILLATION): ICD-10-CM

## 2023-07-24 DIAGNOSIS — Z95.818 PRESENCE OF WATCHMAN LEFT ATRIAL APPENDAGE CLOSURE DEVICE: ICD-10-CM

## 2023-07-24 DIAGNOSIS — I48.91 ATRIAL FIBRILLATION: ICD-10-CM

## 2023-07-24 LAB
BSA FOR ECHO PROCEDURE: 2.28 M2
EJECTION FRACTION: 55 %
POCT GLUCOSE: 105 MG/DL (ref 70–110)
POCT GLUCOSE: 106 MG/DL (ref 70–110)

## 2023-07-24 PROCEDURE — 93325 TRANSESOPHAGEAL ECHO (TEE) (CUPID ONLY): ICD-10-PCS | Mod: 26,,, | Performed by: INTERNAL MEDICINE

## 2023-07-24 PROCEDURE — 93010 EKG 12-LEAD: ICD-10-PCS | Mod: ,,, | Performed by: INTERNAL MEDICINE

## 2023-07-24 PROCEDURE — 25000003 PHARM REV CODE 250: Performed by: NURSE PRACTITIONER

## 2023-07-24 PROCEDURE — D9220A PRA ANESTHESIA: Mod: ANES,,, | Performed by: ANESTHESIOLOGY

## 2023-07-24 PROCEDURE — 93312 ECHO TRANSESOPHAGEAL: CPT | Mod: 26,,, | Performed by: INTERNAL MEDICINE

## 2023-07-24 PROCEDURE — 93005 ELECTROCARDIOGRAM TRACING: CPT

## 2023-07-24 PROCEDURE — 93312 TRANSESOPHAGEAL ECHO (TEE) (CUPID ONLY): ICD-10-PCS | Mod: 26,,, | Performed by: INTERNAL MEDICINE

## 2023-07-24 PROCEDURE — D9220A PRA ANESTHESIA: Mod: CRNA,,, | Performed by: NURSE ANESTHETIST, CERTIFIED REGISTERED

## 2023-07-24 PROCEDURE — D9220A PRA ANESTHESIA: ICD-10-PCS | Mod: CRNA,,, | Performed by: NURSE ANESTHETIST, CERTIFIED REGISTERED

## 2023-07-24 PROCEDURE — D9220A PRA ANESTHESIA: ICD-10-PCS | Mod: ANES,,, | Performed by: ANESTHESIOLOGY

## 2023-07-24 PROCEDURE — 93320 TRANSESOPHAGEAL ECHO (TEE) (CUPID ONLY): ICD-10-PCS | Mod: 26,,, | Performed by: INTERNAL MEDICINE

## 2023-07-24 PROCEDURE — 37000008 HC ANESTHESIA 1ST 15 MINUTES

## 2023-07-24 PROCEDURE — 93325 DOPPLER ECHO COLOR FLOW MAPG: CPT

## 2023-07-24 PROCEDURE — 93320 DOPPLER ECHO COMPLETE: CPT | Mod: 26,,, | Performed by: INTERNAL MEDICINE

## 2023-07-24 PROCEDURE — 93010 ELECTROCARDIOGRAM REPORT: CPT | Mod: ,,, | Performed by: INTERNAL MEDICINE

## 2023-07-24 PROCEDURE — 25000003 PHARM REV CODE 250: Performed by: NURSE ANESTHETIST, CERTIFIED REGISTERED

## 2023-07-24 PROCEDURE — 82962 GLUCOSE BLOOD TEST: CPT

## 2023-07-24 PROCEDURE — 37000009 HC ANESTHESIA EA ADD 15 MINS

## 2023-07-24 PROCEDURE — 63600175 PHARM REV CODE 636 W HCPCS: Performed by: NURSE ANESTHETIST, CERTIFIED REGISTERED

## 2023-07-24 PROCEDURE — 93325 DOPPLER ECHO COLOR FLOW MAPG: CPT | Mod: 26,,, | Performed by: INTERNAL MEDICINE

## 2023-07-24 RX ORDER — HALOPERIDOL 5 MG/ML
0.5 INJECTION INTRAMUSCULAR EVERY 10 MIN PRN
Status: CANCELLED | OUTPATIENT
Start: 2023-07-24

## 2023-07-24 RX ORDER — SUCCINYLCHOLINE CHLORIDE 20 MG/ML
INJECTION INTRAMUSCULAR; INTRAVENOUS
Status: DISCONTINUED | OUTPATIENT
Start: 2023-07-24 | End: 2023-07-24

## 2023-07-24 RX ORDER — ONDANSETRON 2 MG/ML
INJECTION INTRAMUSCULAR; INTRAVENOUS
Status: DISCONTINUED | OUTPATIENT
Start: 2023-07-24 | End: 2023-07-24

## 2023-07-24 RX ORDER — PROPOFOL 10 MG/ML
VIAL (ML) INTRAVENOUS
Status: DISCONTINUED | OUTPATIENT
Start: 2023-07-24 | End: 2023-07-24

## 2023-07-24 RX ORDER — ROCURONIUM BROMIDE 10 MG/ML
INJECTION, SOLUTION INTRAVENOUS
Status: DISCONTINUED | OUTPATIENT
Start: 2023-07-24 | End: 2023-07-24

## 2023-07-24 RX ORDER — LIDOCAINE HYDROCHLORIDE 20 MG/ML
INJECTION, SOLUTION EPIDURAL; INFILTRATION; INTRACAUDAL; PERINEURAL
Status: DISCONTINUED | OUTPATIENT
Start: 2023-07-24 | End: 2023-07-24

## 2023-07-24 RX ORDER — ONDANSETRON 2 MG/ML
4 INJECTION INTRAMUSCULAR; INTRAVENOUS DAILY PRN
Status: CANCELLED | OUTPATIENT
Start: 2023-07-24

## 2023-07-24 RX ORDER — OXYCODONE AND ACETAMINOPHEN 5; 325 MG/1; MG/1
1 TABLET ORAL
Status: CANCELLED | OUTPATIENT
Start: 2023-07-24

## 2023-07-24 RX ORDER — PHENYLEPHRINE HCL IN 0.9% NACL 1 MG/10 ML
SYRINGE (ML) INTRAVENOUS
Status: DISCONTINUED | OUTPATIENT
Start: 2023-07-24 | End: 2023-07-24

## 2023-07-24 RX ORDER — HYDROMORPHONE HYDROCHLORIDE 1 MG/ML
0.2 INJECTION, SOLUTION INTRAMUSCULAR; INTRAVENOUS; SUBCUTANEOUS EVERY 5 MIN PRN
Status: CANCELLED | OUTPATIENT
Start: 2023-07-24

## 2023-07-24 RX ORDER — DEXAMETHASONE SODIUM PHOSPHATE 4 MG/ML
INJECTION, SOLUTION INTRA-ARTICULAR; INTRALESIONAL; INTRAMUSCULAR; INTRAVENOUS; SOFT TISSUE
Status: DISCONTINUED | OUTPATIENT
Start: 2023-07-24 | End: 2023-07-24

## 2023-07-24 RX ORDER — ASPIRIN 81 MG/1
81 TABLET ORAL DAILY
COMMUNITY

## 2023-07-24 RX ORDER — CLOPIDOGREL BISULFATE 75 MG/1
75 TABLET ORAL DAILY
Qty: 30 TABLET | Refills: 11 | Status: SHIPPED | OUTPATIENT
Start: 2023-07-24 | End: 2024-01-17

## 2023-07-24 RX ORDER — SODIUM CHLORIDE 0.9 % (FLUSH) 0.9 %
3 SYRINGE (ML) INJECTION
Status: CANCELLED | OUTPATIENT
Start: 2023-07-24

## 2023-07-24 RX ADMIN — LIDOCAINE HYDROCHLORIDE 50 MG: 20 INJECTION, SOLUTION EPIDURAL; INFILTRATION; INTRACAUDAL at 09:07

## 2023-07-24 RX ADMIN — ROCURONIUM BROMIDE 10 MG: 10 INJECTION, SOLUTION INTRAVENOUS at 09:07

## 2023-07-24 RX ADMIN — ONDANSETRON 4 MG: 2 INJECTION INTRAMUSCULAR; INTRAVENOUS at 09:07

## 2023-07-24 RX ADMIN — PROPOFOL 100 MG: 10 INJECTION, EMULSION INTRAVENOUS at 09:07

## 2023-07-24 RX ADMIN — DEXAMETHASONE SODIUM PHOSPHATE 4 MG: 4 INJECTION INTRA-ARTICULAR; INTRALESIONAL; INTRAMUSCULAR; INTRAVENOUS; SOFT TISSUE at 09:07

## 2023-07-24 RX ADMIN — SODIUM CHLORIDE: 0.9 INJECTION, SOLUTION INTRAVENOUS at 09:07

## 2023-07-24 RX ADMIN — SUCCINYLCHOLINE CHLORIDE 100 MG: 20 INJECTION, SOLUTION INTRAMUSCULAR; INTRAVENOUS; PARENTERAL at 09:07

## 2023-07-24 RX ADMIN — Medication 200 MCG: at 09:07

## 2023-07-24 NOTE — ANESTHESIA POSTPROCEDURE EVALUATION
Anesthesia Post Evaluation    Patient: Vicky Alvarado    Procedure(s) Performed: Procedure(s) (LRB):  Transesophageal echo (REENA) intra-procedure log documentation (N/A)    Final Anesthesia Type: general      Patient location during evaluation: PACU  Patient participation: Yes- Able to Participate  Level of consciousness: awake and alert and oriented  Post-procedure vital signs: reviewed and stable  Pain management: adequate  Airway patency: patent    PONV status at discharge: No PONV  Anesthetic complications: no      Cardiovascular status: stable  Respiratory status: unassisted, spontaneous ventilation and room air  Hydration status: euvolemic  Follow-up not needed.          Vitals Value Taken Time   BP 86/52 07/24/23 1038   Temp  07/24/23 1040   Pulse 64 07/24/23 1040   Resp 25 07/24/23 1040   SpO2 92 % 07/24/23 1040   Vitals shown include unvalidated device data.      No case tracking events are documented in the log.      Pain/Tolu Score: Tolu Score: 9 (7/24/2023 10:25 AM)

## 2023-07-24 NOTE — ANESTHESIA PROCEDURE NOTES
Intubation    Date/Time: 7/24/2023 9:18 AM  Performed by: Agus Joseph CRNA  Authorized by: Easton Auguste Jr., MD     Intubation:     Induction:  Intravenous    Intubated:  Postinduction    Mask Ventilation:  Not attempted    Attempts:  1    Attempted By:  CRNA    Method of Intubation:  Video laryngoscopy    Blade:  Mcnair 3    Laryngeal View Grade: Grade I - full view of cords      Difficult Airway Encountered?: No      Complications:  None    Airway Device:  Oral endotracheal tube    Airway Device Size:  7.5    Style/Cuff Inflation:  Cuffed (inflated to minimal occlusive pressure)    Inflation Amount (mL):  8    Tube secured:  22    Secured at:  The teeth    Placement Verified By:  Capnometry    Complicating Factors:  None    Findings Post-Intubation:  BS equal bilateral and atraumatic/condition of teeth unchanged

## 2023-07-24 NOTE — PLAN OF CARE
Pt d/c'd to home per md orders.  Dr. Auguste spoke to patient prior to discharge.  Instructed pt on home medications, post procedure precautions and follow up visits.  Pt verbalizes understanding. Piv removed prior to discharge.

## 2023-07-24 NOTE — ANESTHESIA PREPROCEDURE EVALUATION
07/24/2023  Vicky Alvarado is a 58 y.o., female.    Procedure: Transesophageal echo (REENA) intra-procedure log documentation - s/p WM, REENA, anes, 3 Prep   Anesthesia type: Choice   Diagnosis: PAF (paroxysmal atrial fibrillation) [I48.0]         Pre-operative evaluation for Procedure(s) (LRB):  Transesophageal echo (REENA) intra-procedure log documentation (N/A)    @[id number]@@    Encounter Diagnoses   Name Primary?    PAF (paroxysmal atrial fibrillation)     Atrial fibrillation     Presence of Watchman left atrial appendage closure device        Review of patient's allergies indicates:   Allergen Reactions    Flecainide Shortness Of Breath and Swelling    Shellfish containing products Other (See Comments)     Makes gout terrible    Vancomycin analogues Hives, Itching and Rash       Medications Prior to Admission   Medication Sig Dispense Refill Last Dose    ALPRAZolam (XANAX) 0.5 MG tablet Take 1 tablet (0.5 mg total) by mouth 2 (two) times daily as needed for Anxiety. 60 tablet 4 Past Week    apixaban (ELIQUIS) 5 mg Tab Take 1 tablet (5 mg total) by mouth 2 (two) times daily. 90 tablet 5 7/24/2023 at 0530    aspirin (ECOTRIN) 81 MG EC tablet Take 81 mg by mouth once daily.   7/23/2023    b complex vitamins capsule Take 1 capsule by mouth once daily.   7/23/2023 at 2100    colchicine (COLCRYS) 0.6 mg tablet For gout attack: Take TWO tablets by mouth, then, 2 hours later, take ONE additional tablet. Then take 1 tablet twice daily only if still needed for gout pain. 20 tablet 4 Past Month    DULoxetine (CYMBALTA) 60 MG capsule Take 2 capsules (120 mg total) by mouth once daily. 180 capsule 3 7/23/2023    furosemide (LASIX) 40 MG tablet Take 1 tablet (40 mg total) by mouth 2 (two) times daily. Resume as needed for edema until followup with your PCP. 60 tablet 11 7/23/2023 at 0900     gluc-shikha-[facility] [place]7-T-rmyf-reymundo-bor 750 mg-644 mg- 30 mg-1 mg Tab Take 1 tablet by mouth once daily.    Past Month    ibuprofen (ADVIL,MOTRIN) 600 MG tablet Take 1 tablet (600 mg total) by mouth every 6 (six) hours as needed for Pain. 30 tablet 1 Past Week    krill/om-3/dha/epa/phospho/ast (MEGARED OMEGA-3 KRILL OIL ORAL) Take 1 capsule by mouth once daily.   Past Week    lisinopriL (PRINIVIL,ZESTRIL) 40 MG tablet Take 1 tablet (40 mg total) by mouth once daily. 90 tablet 3 7/23/2023 at 0900    metoprolol succinate (TOPROL-XL) 50 MG 24 hr tablet Take 4 tablets (200 mg total) by mouth once daily. 180 tablet 3 7/24/2023 at 0530    multivitamin (THERAGRAN) per tablet Take 1 tablet by mouth once daily.   Past Week    NIFEdipine (PROCARDIA-XL) 90 MG (OSM) 24 hr tablet Take 1 tablet (90 mg total) by mouth once daily. HOLD UNTIL FOLLOW-UP WITH YOUR PCP. 30 tablet 11 7/24/2023 at 0530    omeprazole (PRILOSEC) 20 MG capsule TAKE 1 CAPSULE BY MOUTH ONCE DAILY 90 capsule 3 7/24/2023 at 0530    oxyCODONE-acetaminophen (PERCOCET) 7.5-325 mg per tablet Take 1 tablet by mouth every 8 (eight) hours as needed for Pain. 20 tablet 0 Past Month    potassium chloride SA (K-DUR,KLOR-CON) 20 MEQ tablet Take 1 tablet (20 mEq total) by mouth once daily. ONLY TAKE WITH LASIX. 30 tablet 11 7/23/2023    predniSONE (DELTASONE) 10 MG tablet Take 1 tablet by mouth daily 10 tablet 0 Past Month    propafenone (RYTHMOL) 225 MG Tab Take 1 tablet (225 mg total) by mouth every 8 (eight) hours. 90 tablet 11 7/24/2023 at 0530    SENNA 8.6 mg tablet Take 1 tablet by mouth 2 (two) times daily. (Patient taking differently: Take 1 tablet by mouth 2 (two) times daily. prn) 30 tablet 3 Past Month    traZODone (DESYREL) 100 MG tablet Take 1 tablet (100 mg total) by mouth nightly as needed for Insomnia. 90 tablet 3 7/23/2023    albuterol (VENTOLIN HFA) 90 mcg/actuation inhaler Inhale 2 puffs into the lungs every 6 (six) hours as needed for Wheezing.  Rescue 18 g 0 More than a month    atorvastatin (LIPITOR) 80 MG tablet Take 1 tablet (80 mg total) by mouth once daily. 90 tablet 3     ferrous sulfate (SLOW RELEASE IRON) 142 mg (45 mg iron) TbSR Take 1 tablet (142 mg total) by mouth once daily. FOR 7 DAY THEN INCREASE TO TWICE DAILY AS TOLERATED   More than a month    melatonin 10 mg Tab Take 1-2 tablets by mouth nightly as needed (Sleep).   More than a month    tirzepatide 10 mg/0.5 mL PnIj Inject 10 mg into the skin every 7 days. 4 pen 2 7/19/2023    walker (ULTRA-LIGHT ROLLATOR) Misc As directed 1 each 0             Current Facility-Administered Medications on File Prior to Encounter   Medication Dose Route Frequency Provider Last Rate Last Admin    sodium chloride 0.9% bolus 1,000 mL  1,000 mL Intravenous Once Laurie Montgomery NP         Current Outpatient Medications on File Prior to Encounter   Medication Sig Dispense Refill    ALPRAZolam (XANAX) 0.5 MG tablet Take 1 tablet (0.5 mg total) by mouth 2 (two) times daily as needed for Anxiety. 60 tablet 4    apixaban (ELIQUIS) 5 mg Tab Take 1 tablet (5 mg total) by mouth 2 (two) times daily. 90 tablet 5    aspirin (ECOTRIN) 81 MG EC tablet Take 81 mg by mouth once daily.      b complex vitamins capsule Take 1 capsule by mouth once daily.      DULoxetine (CYMBALTA) 60 MG capsule Take 2 capsules (120 mg total) by mouth once daily. 180 capsule 3    furosemide (LASIX) 40 MG tablet Take 1 tablet (40 mg total) by mouth 2 (two) times daily. Resume as needed for edema until followup with your PCP. 60 tablet 11    gluc-shikha-[facility] [place]0-O-igft-reymundo-bor 750 mg-644 mg- 30 mg-1 mg Tab Take 1 tablet by mouth once daily.       ibuprofen (ADVIL,MOTRIN) 600 MG tablet Take 1 tablet (600 mg total) by mouth every 6 (six) hours as needed for Pain. 30 tablet 1    krill/om-3/dha/epa/phospho/ast (MEGARED OMEGA-3 KRILL OIL ORAL) Take 1 capsule by mouth once daily.      metoprolol succinate (TOPROL-XL) 50 MG 24 hr tablet Take 4  tablets (200 mg total) by mouth once daily. 180 tablet 3    multivitamin (THERAGRAN) per tablet Take 1 tablet by mouth once daily.      NIFEdipine (PROCARDIA-XL) 90 MG (OSM) 24 hr tablet Take 1 tablet (90 mg total) by mouth once daily. HOLD UNTIL FOLLOW-UP WITH YOUR PCP. 30 tablet 11    potassium chloride SA (K-DUR,KLOR-CON) 20 MEQ tablet Take 1 tablet (20 mEq total) by mouth once daily. ONLY TAKE WITH LASIX. 30 tablet 11    propafenone (RYTHMOL) 225 MG Tab Take 1 tablet (225 mg total) by mouth every 8 (eight) hours. 90 tablet 11    SENNA 8.6 mg tablet Take 1 tablet by mouth 2 (two) times daily. (Patient taking differently: Take 1 tablet by mouth 2 (two) times daily. prn) 30 tablet 3    traZODone (DESYREL) 100 MG tablet Take 1 tablet (100 mg total) by mouth nightly as needed for Insomnia. 90 tablet 3    albuterol (VENTOLIN HFA) 90 mcg/actuation inhaler Inhale 2 puffs into the lungs every 6 (six) hours as needed for Wheezing. Rescue 18 g 0    atorvastatin (LIPITOR) 80 MG tablet Take 1 tablet (80 mg total) by mouth once daily. 90 tablet 3    ferrous sulfate (SLOW RELEASE IRON) 142 mg (45 mg iron) TbSR Take 1 tablet (142 mg total) by mouth once daily. FOR 7 DAY THEN INCREASE TO TWICE DAILY AS TOLERATED      melatonin 10 mg Tab Take 1-2 tablets by mouth nightly as needed (Sleep).      walker (ULTRA-LIGHT ROLLATOR) Misc As directed 1 each 0    [DISCONTINUED] medroxyPROGESTERone (PROVERA) 10 MG tablet Take 2 tablets (20 mg total) by mouth 3 (three) times daily. 180 tablet 0    [DISCONTINUED] pantoprazole (PROTONIX) 40 MG tablet Take 1 tablet (40 mg total) by mouth once daily. (Patient not taking: Reported on 2/18/2022) 30 tablet 0       Past Medical History:  No date: Anticoagulant long-term use  No date: Arthritis  No date: Atrial fibrillation      Comment:  cardioversion  No date: Atrial fibrillation with RVR  No date: Avascular necrosis      Comment:  L hand  No date: CHF (congestive heart failure)  No  date: Depression  No date: Diabetes mellitus  07/22/2020: Encounter for blood transfusion  03/2022: Encounter for blood transfusion  No date: Fatty liver  No date: GERD (gastroesophageal reflux disease)  No date: Hx of psychiatric care  No date: Hypertension  05/07/2022: Iron deficiency anemia      Comment:  TIBC 444 with saturated iron 8  No date: Liver disease  No date: Obese  05/07/2022: Pre-diabetes  No date: Psychiatric problem  No date: Sleep apnea      Comment:  wears cpap    Past Surgical History:   Procedure Laterality Date    ABLATION OF ARRHYTHMOGENIC FOCUS FOR ATRIAL FIBRILLATION N/A 07/20/2020    Procedure: Ablation atrial fibrillation;  Surgeon: Gabriel Hawley MD;  Location: [facility] EP LAB;  Service: Cardiology;  Laterality: N/A;  afib, PVI, RFA, REENA, SHADY, anes, MB, 3 Prep    ABLATION OF ARRHYTHMOGENIC FOCUS FOR ATRIAL FIBRILLATION N/A 01/24/2022    Procedure: Ablation atrial fibrillation;  Surgeon: Gabriel Hawley MD;  Location: [facility] EP LAB;  Service: Cardiology;  Laterality: N/A;  afib/afl, PVI (re-do)/CTI, RFA, REENA (cx if SR), SHADY, anes, MB, 3 Prep    APPLICATION OF WOUND VACUUM-ASSISTED CLOSURE DEVICE Left 08/03/2020    Procedure: APPLICATION, WOUND VAC;  Surgeon: SEMAJ Márquez III, MD;  [facility];  Service: Peripheral Vascular;  Laterality: Left;    APPLICATION OF WOUND VACUUM-ASSISTED CLOSURE DEVICE Left 08/06/2020    Procedure: APPLICATION, WOUND VAC;  Surgeon: SEMAJ Márquez III, MD;  Location: [facility] OR 2ND FLR;  Service: Peripheral Vascular;  Laterality: Left;    CARPAL TUNNEL RELEASE Right 06/10/2020    Procedure: RELEASE, CARPAL TUNNEL - RIGHT;  Surgeon: Adelaida Hall MD;  Location: [facility] OR;  Service: Orthopedics;  Laterality: Right;  GENERAL AND REGIONAL    CARPAL TUNNEL RELEASE Left 05/05/2021    Procedure: RELEASE, CARPAL TUNNEL, LEFT;  Surgeon: Adelaida Hall MD;  Location: [facility] OR;  Service: Orthopedics;  Laterality: Left;  GENERAL & REGIONAL  IN PACU    CLOSURE OF WOUND Left 08/06/2020    Procedure: CLOSURE, WOUND;  Surgeon: SEMAJ Márquez III, MD;  [facility];  Service: Peripheral Vascular;  Laterality: Left;  Complex    COLONOSCOPY N/A 08/17/2021    Procedure: COLONOSCOPY Suprep;  Surgeon: Loco Deluca MD;  Location: [facility] ENDO;  Service: Endoscopy;  Laterality: N/A;    ESOPHAGOGASTRODUODENOSCOPY N/A 08/17/2021    Procedure: EGD (ESOPHAGOGASTRODUODENOSCOPY);  Surgeon: Loco Deluca MD;  Location: [facility] ENDO;  Service: Endoscopy;  Laterality: N/A;  Patient is schedule to have her Covid test on 08/14/2021 at the [facility] @ 9:30am. [place].    EXPLORATION OF FEMORAL ARTERY Left 07/21/2020    Procedure: EXPLORATION, ARTERY, FEMORAL;  Surgeon: SEMAJ Márquez III, MD;  [facility];  Service: Peripheral Vascular;  Laterality: Left;  1. Urgent Direct repair, L SFA branch laceration    FOOT SURGERY      HYSTERECTOMY      HYSTEROSCOPY WITH DILATION AND CURETTAGE OF UTERUS N/A 02/19/2022    Procedure: HYSTEROSCOPY, WITH DILATION AND CURETTAGE OF UTERUS;  Surgeon: Shane Palomo MD;  [facility];  Service: OB/GYN;  Laterality: N/A;    INCISION AND DRAINAGE OF KNEE Left 05/12/2022    Procedure: INCISION AND DRAINAGE, Prepatellar bursectomy.;  Surgeon: Dre Guzmán MD;  [facility];  Service: Orthopedics;  Laterality: Left;    LEFT HEART CATHETERIZATION Left 02/07/2023    Procedure: Left heart cath;  Surgeon: Josiah Terry MD;  Location: [facility] CATH LAB;  Service: Cardiology;  Laterality: Left;  borderline moderate bleeding risk 6.3%    OCCLUSION OF LEFT ATRIAL APPENDAGE N/A 06/12/2023    Procedure: Left atrial appendage occlusion;  Surgeon: Gabriel Hawley MD;  Location: [facility] EP LAB;  Service: Cardiology;  Laterality: N/A;  afib, watchman, BSCI, demi, anes, MB, 3prep    ROBOT-ASSISTED LAPAROSCOPIC ABDOMINAL HYSTERECTOMY USING DA KEIRY XI N/A 08/09/2022     Procedure: XI ROBOTIC HYSTERECTOMY;  Surgeon: Yolanda Barriga MD;  Location: [facility];  Service: OB/GYN;  Laterality: N/A;  dual console requested    ROBOT-ASSISTED LAPAROSCOPIC SALPINGO-OOPHORECTOMY Bilateral 2022    Procedure: ROBOTIC SALPINGO-OOPHORECTOMY;  Surgeon: Yolanda Barriga MD;  Location: [facility];  Service: OB/GYN;  Laterality: Bilateral;    TRANSESOPHAGEAL ECHOCARDIOGRAPHY N/A 2023    Procedure: ECHOCARDIOGRAM, TRANSESOPHAGEAL;  Surgeon: Cyril Mast MD;  Location: [facility] EP LAB;  Service: Cardiology;  Laterality: N/A;    TREATMENT OF CARDIAC ARRHYTHMIA N/A 2020    Procedure: CARDIOVERSION;  Surgeon: Gabriel Hawley MD;  Location: [facility] EP LAB;  Service: Cardiology;  Laterality: N/A;  af, demi, dccv, anes, mb, 345    TREATMENT OF CARDIAC ARRHYTHMIA  2022    Procedure: Cardioversion or Defibrillation;  Surgeon: Gabriel Hawley MD;  Location: [facility] EP LAB;  Service: Cardiology;;    WOUND DEBRIDEMENT Left 2020    Procedure: DEBRIDEMENT, WOUND;  Surgeon: ESMAJ Márquez III, MD;  Location: [facility] 2ND FLR;  Service: Peripheral Vascular;  Laterality: Left;       Social History     Tobacco Use   Smoking Status Former    Types: Cigarettes    Quit date: 2019    Years since quittin.0   Smokeless Tobacco Never       Social History     Substance and Sexual Activity   Alcohol Use No       Physical Activity: Not on file         No results for input(s): HCT in the last 72 hours.  No results for input(s): PLT in the last 72 hours.  No results for input(s): K in the last 72 hours.  No results for input(s): CREATININE in the last 72 hours.  No results for input(s): GLU in the last 72 hours.  No results for input(s): PT in the last 72 hours.                    Pre-op Assessment          Review of Systems  Anesthesia Hx:  No problems with previous Anesthesia Intubated after hysterctomy for 4 days after prolongued trendelenburg   Hematology/Oncology:  Hematology  Normal   Oncology Normal   Hematology Comments: Took eliquis this am.    Cardiovascular:   Hypertension, well controlled Denies MI. CAD  asymptomatic Dysrhythmias atrial fibrillation  Denies Angina. CHF Left femoral arterly repair with first ablation   Pulmonary:   Denies COPD.  Denies Asthma. Shortness of breath Uses scooter at store, SOB walking around house sometimes.  100 yards is the most she will walk, this has been stable for a while.    Had inhaler prescribed for cold months ago, but does not need it now   Renal/:   Denies Chronic Renal Disease.     Hepatic/GI:   GERD, well controlled Liver Disease, (? NASHwith some nodules)    Neurological:   Denies TIA. Denies CVA. Denies Seizures.    Endocrine:   Diabetes, type 2 10 mg Wednesdays, last took Wednesday, no emesis, mild nausea improving       Physical Exam  General: Well nourished, Cooperative, Alert and Oriented    Airway:  Mallampati: II   Mouth Opening: Normal  TM Distance: Normal  Tongue: Normal  Neck ROM: Normal ROM    Dental:Full upper      Anesthesia Plan  Type of Anesthesia, risks & benefits discussed:    Anesthesia Type: Gen ETT  Intra-op Monitoring Plan: Standard ASA Monitors  Post Op Pain Control Plan: IV/PO Opioids PRN  Induction:  IV  Informed Consent: Informed consent signed with the Patient and all parties understand the risks and agree with anesthesia plan.  All questions answered.   ASA Score: 3  Day of Surgery Review of History & Physical: H&P Update referred to the surgeon/provider.    Ready For Surgery From Anesthesia Perspective.     .    Intubation     Date/Time: 6/12/2023 7:28 AM  Performed by: Apryl Tidwell CRNA  Authorized by: Jose Tobias MD      Intubation:     Induction:  Intravenous    Intubated:  Postinduction    Mask Ventilation:  Easy mask    Attempts:  1    Attempted By:  CRNA    Method of Intubation:  Video laryngoscopy    Blade:  Mcnair 3    Laryngeal View Grade: Grade I - full view of cords      Difficult Airway  Encountered?: No      Complications:  None    Airway Device:  Oral endotracheal tube    Airway Device Size:  7.0    Style/Cuff Inflation:  Cuffed (inflated to minimal occlusive pressure)    Tube secured:  22    Placement Verified By:  Capnometry    Complicating Factors:  None    Findings Post-Intubation:  BS equal bilateral    1/2023  Conclusion         There is a small sized, mild intensity mid to apical anteroseptal resting perfusion abnormality involving 7% of LV myocardium in the distribution of the mid  LAD. After pharmacologic stress, this perfusion abnormality is larger and more severe involving 13% of the LV myocardium, indicative of ischemia with underlying scar.    Within the perfusion abnormality, absolute myocardial perfusion (cc/min/gm) averaged 0.44 cc/min/g at rest, 0.65 cc/min/g at stress and CFR was 1.46 , which equates to moderately reduced coronary flow capacity within the LAD territory.    There are no other significant perfusion abnormalities.    The whole heart absolute myocardial perfusion values averaged 0.66 cc/min/g at rest, which is normal; 1.03 cc/min/g at stress, which is moderately reduced; and CFR is 1.57 , which is moderately reduced.    CT attenuation images demonstrate moderate diffuse coronary calcifications in the LAD territory and no aortic calcifications.    The gated perfusion images showed an ejection fraction of 53% at rest and 57% during stress. A normal ejection fraction is greater than 47%.    The wall motion is normal at rest and during stress.    The LV cavity size is mildly enlarged at rest and stress.    The ECG portion of the study is negative for ischemia.    During stress, occasional PACs are noted. , During stress, occasional PVCs are noted.    The patient reported no chest pain during the stress test.    When compared to the previous study from 3/3/2020, there are significant changes. There is now a new reversible perfusion abnormality (with underlying  scar) in the LAD territory with boderline flow capacity.      2/7/2023  Findings:     1. The left main was normal  2. The LAD had luminal irregularity without obstructive disease  3. The proximal circumflex and large OMB were normal.  The distal circumflex supplying the distal OMB had 70-80% stenosis.  This was a small to medium-sized vessel.  4. The RCA had luminal irregularity distally with a 50% stenosis before the PDA.

## 2023-07-24 NOTE — PLAN OF CARE
Pt transferred from procedure.  Post op orders and assessment initiated.  Pt aao.  Call bell within reach.

## 2023-07-24 NOTE — DISCHARGE SUMMARY
[facility]  Cardiology  Discharge Summary      Patient Name: Vicky Alvarado  MRN: [id number]  Admission Date: 7/24/2023  Hospital Length of Stay: 0 days  Discharge Date and Time:  07/24/2023 9:57 AM  Attending Physician: Gabriel Hawley MD    Discharging Provider: Tico Howell MD  Primary Care Physician: Gordy Cordero MD    HPI:   57 yoF here for LAAO implantation. She has a history of AF managed with RFA x , last one being 4/22. Regarding her AF, She feels that the AF was compromising her QOL She has also developed  bleeding and is due for hysterectomy later this spring. Symptoms much improved. She has no sustained arrhythmias since her last ablation. She has developed fatigue and Fe deficient anemia. I offered LAAO due to elevated bleeding and stroke risks. The patient can tolerate short term OAC but is not a candidate for long term OAC due to recurrent bleeding issues. I discussed management options regarding LAAO. I discussed the process of LAAO via Watchman including pre-procedure testing  as well as the need to take OAC for 6 weeks post implant. We discussed post procedure REENA studies to assess EMILIANO occlusion. Additionally I mentioned the need to take DAPT up to the 6 month point post implant.     S/P 27MM watchman FLX implantation with 20% compression. Groins with no hematoma. Plan for discharge today with 45 day post watchman REENA.      Procedure(s) (LRB):  Transesophageal echo (REENA) intra-procedure log documentation (N/A)     Indwelling Lines/Drains at time of discharge:  Lines/Drains/Airways     None                 Hospital Course:  REENA performed.  Showing 2mm peridevice leak, device well seated and good compressions.  Pt tolerated the procedure and recovered without any issues.  Discussed case with Dr. Hawley, her eliquis was stopped and to continue ASA 81mg qD.  She was started on plavix 75mg qD.  Pt discharged home.      Goals of Care Treatment Preferences:  Code  Status: Full Code      Consults:     Significant Diagnostic Studies: N/A  REENA - see report for details    Pending Diagnostic Studies:     None          Final Active Diagnoses:    Diagnosis Date Noted POA    PRINCIPAL PROBLEM:  Atrial Fibrillation (paroxysmal) [I48.0] 02/26/2020 Yes      Problems Resolved During this Admission:     No new Assessment & Plan notes have been filed under this hospital service since the last note was generated.  Service: Cardiology      Discharged Condition: good    Disposition: Home or Self Care    Follow Up:    Patient Instructions:   No discharge procedures on file.  Medications:  Reconciled Home Medications:      Medication List      START taking these medications    clopidogreL 75 mg tablet  Commonly known as: PLAVIX  Take 1 tablet (75 mg total) by mouth once daily.        CONTINUE taking these medications    albuterol 90 mcg/actuation inhaler  Commonly known as: VENTOLIN HFA  Inhale 2 puffs into the lungs every 6 (six) hours as needed for Wheezing. Rescue     ALPRAZolam 0.5 MG tablet  Commonly known as: XANAX  Take 1 tablet (0.5 mg total) by mouth 2 (two) times daily as needed for Anxiety.     aspirin 81 MG EC tablet  Commonly known as: ECOTRIN  Take 81 mg by mouth once daily.     atorvastatin 80 MG tablet  Commonly known as: LIPITOR  Take 1 tablet (80 mg total) by mouth once daily.     b complex vitamins capsule  Take 1 capsule by mouth once daily.     colchicine 0.6 mg tablet  Commonly known as: COLCRYS  For gout attack: Take TWO tablets by mouth, then, 2 hours later, take ONE additional tablet. Then take 1 tablet twice daily only if still needed for gout pain.     DULoxetine 60 MG capsule  Commonly known as: CYMBALTA  Take 2 capsules (120 mg total) by mouth once daily.     ferrous sulfate 142 mg (45 mg iron) Tbsr  Commonly known as: SLOW RELEASE IRON  Take 1 tablet (142 mg total) by mouth once daily. FOR 7 DAY THEN INCREASE TO TWICE DAILY AS TOLERATED     furosemide 40 MG  tablet  Commonly known as: LASIX  Take 1 tablet (40 mg total) by mouth 2 (two) times daily. Resume as needed for edema until followup with your PCP.     xuztbosm-akae-xft2-C-radha-bosw 750 mg-644 mg- 30 mg-1 mg Tab  Take 1 tablet by mouth once daily.     ibuprofen 600 MG tablet  Commonly known as: ADVIL,MOTRIN  Take 1 tablet (600 mg total) by mouth every 6 (six) hours as needed for Pain.     lisinopriL 40 MG tablet  Commonly known as: PRINIVIL,ZESTRIL  Take 1 tablet (40 mg total) by mouth once daily.     MEGARED OMEGA-3 KRILL OIL ORAL  Take 1 capsule by mouth once daily.     melatonin 10 mg Tab  Take 1-2 tablets by mouth nightly as needed (Sleep).     metoprolol succinate 50 MG 24 hr tablet  Commonly known as: TOPROL-XL  Take 4 tablets (200 mg total) by mouth once daily.     MOUNJARO 10 mg/0.5 mL Pnij  Generic drug: tirzepatide  Inject 10 mg into the skin every 7 days.     multivitamin per tablet  Commonly known as: THERAGRAN  Take 1 tablet by mouth once daily.     NIFEdipine 90 MG (OSM) 24 hr tablet  Commonly known as: PROCARDIA-XL  Take 1 tablet (90 mg total) by mouth once daily. HOLD UNTIL FOLLOW-UP WITH YOUR PCP.     omeprazole 20 MG capsule  Commonly known as: PRILOSEC  TAKE 1 CAPSULE BY MOUTH ONCE DAILY     oxyCODONE-acetaminophen 7.5-325 mg per tablet  Commonly known as: PERCOCET  Take 1 tablet by mouth every 8 (eight) hours as needed for Pain.     potassium chloride SA 20 MEQ tablet  Commonly known as: K-DUR,KLOR-CON  Take 1 tablet (20 mEq total) by mouth once daily. ONLY TAKE WITH LASIX.     predniSONE 10 MG tablet  Commonly known as: DELTASONE  Take 1 tablet by mouth daily     propafenone 225 MG Tab  Commonly known as: RYTHMOL  Take 1 tablet (225 mg total) by mouth every 8 (eight) hours.     SENNA 8.6 mg tablet  Generic drug: senna  Take 1 tablet by mouth 2 (two) times daily.     traZODone 100 MG tablet  Commonly known as: DESYREL  Take 1 tablet (100 mg total) by mouth nightly as needed for Insomnia.      ULTRA-LIGHT ROLLATOR Misc  Generic drug: walker  As directed        STOP taking these medications    ELIQUIS 5 mg Tab  Generic drug: apixaban            Time spent on the discharge of patient: 31 minutes    Tico Howell MD  Cardiology  [facility] - Cardiology

## 2023-07-24 NOTE — HPI
57 yoF here for LAAO implantation. She has a history of AF managed with RFA x , last one being 4/22. Regarding her AF, She feels that the AF was compromising her QOL She has also developed  bleeding and is due for hysterectomy later this spring. Symptoms much improved. She has no sustained arrhythmias since her last ablation. She has developed fatigue and Fe deficient anemia. I offered LAAO due to elevated bleeding and stroke risks. The patient can tolerate short term OAC but is not a candidate for long term OAC due to recurrent bleeding issues. I discussed management options regarding LAAO. I discussed the process of LAAO via Watchman including pre-procedure testing  as well as the need to take OAC for 6 weeks post implant. We discussed post procedure REENA studies to assess EMILIANO occlusion. Additionally I mentioned the need to take DAPT up to the 6 month point post implant.     S/P 27MM watchman FLX implantation with 20% compression. Groins with no hematoma. Plan for discharge today with 45 day post watchman REENA.

## 2023-07-24 NOTE — SUBJECTIVE & OBJECTIVE
Past Medical History:   Diagnosis Date    Anticoagulant long-term use     Arthritis     Atrial fibrillation     cardioversion    Atrial fibrillation with RVR     Avascular necrosis     L hand    CHF (congestive heart failure)     Depression     Diabetes mellitus     Encounter for blood transfusion 07/22/2020    Encounter for blood transfusion 03/2022    Fatty liver     GERD (gastroesophageal reflux disease)     Hx of psychiatric care     Hypertension     Iron deficiency anemia 05/07/2022    TIBC 444 with saturated iron 8    Liver disease     Obese     Pre-diabetes 05/07/2022    Psychiatric problem     Sleep apnea     wears cpap       Past Surgical History:   Procedure Laterality Date    ABLATION OF ARRHYTHMOGENIC FOCUS FOR ATRIAL FIBRILLATION N/A 07/20/2020    Procedure: Ablation atrial fibrillation;  Surgeon: Gabriel Hwaley MD;  Location: [facility] EP LAB;  Service: Cardiology;  Laterality: N/A;  afib, PVI, RFA, REENA, SHADY, anes, MB, 3 Prep    ABLATION OF ARRHYTHMOGENIC FOCUS FOR ATRIAL FIBRILLATION N/A 01/24/2022    Procedure: Ablation atrial fibrillation;  Surgeon: Gabriel Hawley MD;  Location: [facility] EP LAB;  Service: Cardiology;  Laterality: N/A;  afib/afl, PVI (re-do)/CTI, RFA, REENA (cx if SR), SHADY, anes, MB, 3 Prep    APPLICATION OF WOUND VACUUM-ASSISTED CLOSURE DEVICE Left 08/03/2020    Procedure: APPLICATION, WOUND VAC;  Surgeon: SEMAJ Márquez III, MD;  Location: [address];  Service: Peripheral Vascular;  Laterality: Left;    APPLICATION OF WOUND VACUUM-ASSISTED CLOSURE DEVICE Left 08/06/2020    Procedure: APPLICATION, WOUND VAC;  Surgeon: SEMAJ Márquez III, MD;  Location: [address];  Service: Peripheral Vascular;  Laterality: Left;    CARPAL TUNNEL RELEASE Right 06/10/2020    Procedure: RELEASE, CARPAL TUNNEL - RIGHT;  Surgeon: Adelaida Hall MD;  Location: [facility];  Service: Orthopedics;  Laterality: Right;  GENERAL AND REGIONAL    CARPAL TUNNEL RELEASE Left 05/05/2021    Procedure:  RELEASE, CARPAL TUNNEL, LEFT;  Surgeon: Adelaida Hall MD;  Location: [facility];  Service: Orthopedics;  Laterality: Left;  GENERAL & REGIONAL IN PACU    CLOSURE OF WOUND Left 08/06/2020    Procedure: CLOSURE, WOUND;  Surgeon: SEMAJ Márquez III, MD;  [facility];  Service: Peripheral Vascular;  Laterality: Left;  Complex    COLONOSCOPY N/A 08/17/2021    Procedure: COLONOSCOPY Suprep;  Surgeon: Loco Deluca MD;  Location: [facility];  Service: Endoscopy;  Laterality: N/A;    ESOPHAGOGASTRODUODENOSCOPY N/A 08/17/2021    Procedure: EGD (ESOPHAGOGASTRODUODENOSCOPY);  Surgeon: Loco Deluca MD;  Location: [facility];  Service: Endoscopy;  Laterality: N/A;  Patient is schedule to have her Covid test on 08/14/2021 at the [facility] @ 9:30am. [place].    EXPLORATION OF FEMORAL ARTERY Left 07/21/2020    Procedure: EXPLORATION, ARTERY, FEMORAL;  Surgeon: SEMAJ Márquez III, MD;  Location: [address];  Service: Peripheral Vascular;  Laterality: Left;  1. Urgent Direct repair, L SFA branch laceration    FOOT SURGERY      HYSTERECTOMY      HYSTEROSCOPY WITH DILATION AND CURETTAGE OF UTERUS N/A 02/19/2022    Procedure: HYSTEROSCOPY, WITH DILATION AND CURETTAGE OF UTERUS;  Surgeon: Shane Palomo MD;  [address];  Service: OB/GYN;  Laterality: N/A;    INCISION AND DRAINAGE OF KNEE Left 05/12/2022    Procedure: INCISION AND DRAINAGE, Prepatellar bursectomy.;  Surgeon: Dre Guzmán MD;  [address];  Service: Orthopedics;  Laterality: Left;    LEFT HEART CATHETERIZATION Left 02/07/2023    Procedure: Left heart cath;  Surgeon: Josiah Terry MD;  Location: [facility] CATH LAB;  Service: Cardiology;  Laterality: Left;  borderline moderate bleeding risk 6.3%    OCCLUSION OF LEFT ATRIAL APPENDAGE N/A 06/12/2023    Procedure: Left atrial appendage occlusion;  Surgeon: Gabriel Hawley MD;  Location: [facility] EP LAB;  Service: Cardiology;  Laterality: N/A;  afib,  jami BSCI, demi, DESTINY ibarra, 3prep    ROBOT-ASSISTED LAPAROSCOPIC ABDOMINAL HYSTERECTOMY USING DA KEIRY XI N/A 08/09/2022    Procedure: XI ROBOTIC HYSTERECTOMY;  Surgeon: Yolanda Barriga MD;  Location: [facility];  Service: OB/GYN;  Laterality: N/A;  dual console requested    ROBOT-ASSISTED LAPAROSCOPIC SALPINGO-OOPHORECTOMY Bilateral 08/09/2022    Procedure: ROBOTIC SALPINGO-OOPHORECTOMY;  Surgeon: Yolanda Barriga MD;  Location: [facility] OR;  Service: OB/GYN;  Laterality: Bilateral;    TRANSESOPHAGEAL ECHOCARDIOGRAPHY N/A 06/12/2023    Procedure: ECHOCARDIOGRAM, TRANSESOPHAGEAL;  Surgeon: Cyril Mast MD;  Location: [facility] EP LAB;  Service: Cardiology;  Laterality: N/A;    TREATMENT OF CARDIAC ARRHYTHMIA N/A 01/29/2020    Procedure: CARDIOVERSION;  Surgeon: Gabriel Hawley MD;  Location: [facility] EP LAB;  Service: Cardiology;  Laterality: N/A;  af, demi, dccv, destiny ibarra, 345    TREATMENT OF CARDIAC ARRHYTHMIA  01/24/2022    Procedure: Cardioversion or Defibrillation;  Surgeon: Gabriel Hawley MD;  Location: [facility] EP LAB;  Service: Cardiology;;    WOUND DEBRIDEMENT Left 08/06/2020    Procedure: DEBRIDEMENT, WOUND;  Surgeon: SEMAJ Márquez III, MD;  [address];  Service: Peripheral Vascular;  Laterality: Left;       Review of patient's allergies indicates:   Allergen Reactions    Flecainide Shortness Of Breath and Swelling    Shellfish containing products Other (See Comments)     Makes gout terrible    Vancomycin analogues Hives, Itching and Rash       Current Facility-Administered Medications on File Prior to Encounter   Medication    sodium chloride 0.9% bolus 1,000 mL     Current Outpatient Medications on File Prior to Encounter   Medication Sig    ALPRAZolam (XANAX) 0.5 MG tablet Take 1 tablet (0.5 mg total) by mouth 2 (two) times daily as needed for Anxiety.    apixaban (ELIQUIS) 5 mg Tab Take 1 tablet (5 mg total) by mouth 2 (two) times daily.    aspirin (ECOTRIN) 81 MG EC tablet Take 81 mg  by mouth once daily.    b complex vitamins capsule Take 1 capsule by mouth once daily.    DULoxetine (CYMBALTA) 60 MG capsule Take 2 capsules (120 mg total) by mouth once daily.    furosemide (LASIX) 40 MG tablet Take 1 tablet (40 mg total) by mouth 2 (two) times daily. Resume as needed for edema until followup with your PCP.    gluc-shikha-[id number]-lpjg-reymundo-bor 750 mg-644 mg- 30 mg-1 mg Tab Take 1 tablet by mouth once daily.     ibuprofen (ADVIL,MOTRIN) 600 MG tablet Take 1 tablet (600 mg total) by mouth every 6 (six) hours as needed for Pain.    krill/om-3/dha/epa/phospho/ast (MEGARED OMEGA-3 KRILL OIL ORAL) Take 1 capsule by mouth once daily.    metoprolol succinate (TOPROL-XL) 50 MG 24 hr tablet Take 4 tablets (200 mg total) by mouth once daily.    multivitamin (THERAGRAN) per tablet Take 1 tablet by mouth once daily.    NIFEdipine (PROCARDIA-XL) 90 MG (OSM) 24 hr tablet Take 1 tablet (90 mg total) by mouth once daily. HOLD UNTIL FOLLOW-UP WITH YOUR PCP.    potassium chloride SA (K-DUR,KLOR-CON) 20 MEQ tablet Take 1 tablet (20 mEq total) by mouth once daily. ONLY TAKE WITH LASIX.    propafenone (RYTHMOL) 225 MG Tab Take 1 tablet (225 mg total) by mouth every 8 (eight) hours.    SENNA 8.6 mg tablet Take 1 tablet by mouth 2 (two) times daily. (Patient taking differently: Take 1 tablet by mouth 2 (two) times daily. prn)    traZODone (DESYREL) 100 MG tablet Take 1 tablet (100 mg total) by mouth nightly as needed for Insomnia.    albuterol (VENTOLIN HFA) 90 mcg/actuation inhaler Inhale 2 puffs into the lungs every 6 (six) hours as needed for Wheezing. Rescue    atorvastatin (LIPITOR) 80 MG tablet Take 1 tablet (80 mg total) by mouth once daily.    ferrous sulfate (SLOW RELEASE IRON) 142 mg (45 mg iron) TbSR Take 1 tablet (142 mg total) by mouth once daily. FOR 7 DAY THEN INCREASE TO TWICE DAILY AS TOLERATED    melatonin 10 mg Tab Take 1-2 tablets by mouth nightly as needed (Sleep).    walker (ULTRA-LIGHT ROLLATOR) Misc  As directed    [DISCONTINUED] medroxyPROGESTERone (PROVERA) 10 MG tablet Take 2 tablets (20 mg total) by mouth 3 (three) times daily.    [DISCONTINUED] pantoprazole (PROTONIX) 40 MG tablet Take 1 tablet (40 mg total) by mouth once daily. (Patient not taking: Reported on 2022)     Family History       Problem Relation (Age of Onset)    Cataracts Mother    Diabetes Father    Hypertension Mother, Father, Sister, Brother    Thyroid disease Mother          Tobacco Use    Smoking status: Former     Types: Cigarettes     Quit date: 2019     Years since quittin.0    Smokeless tobacco: Never   Substance and Sexual Activity    Alcohol use: No    Drug use: No    Sexual activity: Not Currently     Partners: Male     Birth control/protection: See Surgical Hx     Review of Systems   Constitutional: Negative for fever and malaise/fatigue.   HENT:  Negative for congestion and sore throat.    Eyes:  Negative for blurred vision, vision loss in left eye and vision loss in right eye.   Cardiovascular:  Negative for chest pain, dyspnea on exertion, irregular heartbeat, leg swelling, near-syncope, palpitations and syncope.   Respiratory:  Negative for shortness of breath and wheezing.    Endocrine: Negative.    Hematologic/Lymphatic: Negative.    Skin: Negative.    Musculoskeletal:  Negative for arthritis, back pain, joint pain and myalgias.   Gastrointestinal:  Negative for abdominal pain, hematemesis, hematochezia and melena.   Genitourinary: Negative.    Neurological:  Negative for light-headedness and loss of balance.   Psychiatric/Behavioral: Negative.     Objective:     Vital Signs (Most Recent):    Vital Signs (24h Range):           There is no height or weight on file to calculate BMI.            No intake or output data in the 24 hours ending 23 0730    Lines/Drains/Airways       None                    Physical Exam  Vitals and nursing note reviewed.   Constitutional:       Appearance: Normal appearance. She  is normal weight.   HENT:      Head: Atraumatic.      Mouth/Throat:      Mouth: Mucous membranes are moist.   Eyes:      General: No scleral icterus.     Extraocular Movements: Extraocular movements intact.   Neck:      Vascular: No carotid bruit.   Cardiovascular:      Rate and Rhythm: Normal rate and regular rhythm.      Pulses: Normal pulses.      Heart sounds: Normal heart sounds. No murmur heard.    No gallop.   Pulmonary:      Effort: Pulmonary effort is normal. No respiratory distress.      Breath sounds: Normal breath sounds. No wheezing.   Abdominal:      General: Abdomen is flat. Bowel sounds are normal.      Palpations: Abdomen is soft.   Musculoskeletal:         General: Normal range of motion.      Right lower leg: No edema.      Left lower leg: No edema.   Skin:     General: Skin is warm and dry.      Capillary Refill: Capillary refill takes less than 2 seconds.   Neurological:      General: No focal deficit present.      Mental Status: She is alert and oriented to person, place, and time. Mental status is at baseline.        Significant Labs: All pertinent lab results from the last 24 hours have been reviewed.    Significant Imaging: Echocardiogram: Transthoracic echo (TTE) complete (Cupid Only):   Results for orders placed or performed during the hospital encounter of 10/10/22   Echo   Result Value Ref Range    BSA 2.39 m2    TDI SEPTAL 0.06 m/s    LV LATERAL E/E' RATIO 12.25 m/s    LV SEPTAL E/E' RATIO 16.33 m/s    LA WIDTH 5.11 cm    TDI LATERAL 0.08 m/s    LVIDd 5.44 3.5 - 6.0 cm    IVS 1.00 0.6 - 1.1 cm    Posterior Wall 0.94 0.6 - 1.1 cm    LVIDs 4.01 (A) 2.1 - 4.0 cm    FS 26 28 - 44 %    LA volume 99.92 cm3    Sinus 3.29 cm    STJ 2.77 cm    Ascending aorta 3.34 cm    LV mass 201.08 g    LA size 3.60 cm    RVDD 3.58 cm    TAPSE 2.18 cm    RV S' 10.32 cm/s    Left Ventricle Relative Wall Thickness 0.35 cm    AV mean gradient 14 mmHg    AV valve area 1.66 cm2    AV Velocity Ratio 0.50     AV  "index (prosthetic) 0.56     MV valve area p 1/2 method 3.97 cm2    E/A ratio 1.56     Mean e' 0.07 m/s    E wave deceleration time 191.18 msec    IVRT 91.34 msec    MV "A" wave duration 22.45 msec    Pulm vein S/D ratio 0.53     LVOT diameter 1.94 cm    LVOT area 3.0 cm2    LVOT peak eulalio 1.47 m/s    LVOT peak VTI 35.36 cm    Ao peak eulalio 2.94 m/s    Ao VTI 62.88 cm    LVOT stroke volume 104.47 cm3    AV peak gradient 35 mmHg    E/E' ratio 14.00 m/s    MV Peak E Eulalio 0.98 m/s    TR Max Eulalio 2.81 m/s    MV stenosis pressure 1/2 time 55.44 ms    MV Peak A Eulalio 0.63 m/s    PV Peak S Eulalio 0.37 m/s    PV Peak D Eulalio 0.70 m/s    LV Systolic Volume 70.28 mL    LV Systolic Volume Index 30.8 mL/m2    LV Diastolic Volume 143.81 mL    LV Diastolic Volume Index 63.07 mL/m2    LA Volume Index 43.8 mL/m2    LV Mass Index 88 g/m2    RA Major Axis 4.90 cm    Left Atrium Minor Axis 6.38 cm    Left Atrium Major Axis 6.40 cm    Triscuspid Valve Regurgitation Peak Gradient 32 mmHg    LA Volume Index (Mod) 59.3 mL/m2    LA volume (mod) 135.26 cm3    RA Width 3.59 cm    Right Atrial Pressure (from IVC) 3 mmHg    EF 60 %    TV rest pulmonary artery pressure 35 mmHg    Narrative    · The left ventricle is mildly enlarged with normal systolic function. The   estimated ejection fraction is 60%.  · Normal right ventricular size with normal right ventricular systolic   function.  · Grade II left ventricular diastolic dysfunction.  · Moderate left atrial enlargement.  · There is mild aortic valve stenosis.  · Aortic valve area is 1.66 cm2; peak velocity is 2.94 m/s; mean gradient   is 14 mmHg.  · The estimated PA systolic pressure is 35 mmHg.  · Normal central venous pressure (3 mmHg).        "

## 2023-07-24 NOTE — H&P
[facility] - Short Stay Cardiac Unit  Cardiology  History and Physical     Patient Name: Vicky Alvarado  MRN: [id number]  Admission Date: 7/24/2023  Code Status: Prior   Attending Provider: Gabriel Hawley MD   Primary Care Physician: Gordy Cordero MD  Principal Problem:PAF (paroxysmal atrial fibrillation)    Patient information was obtained from patient and ER records.     Subjective:     Chief Complaint:  watchman     HPI:  57 yoF here for LAAO implantation. She has a history of AF managed with RFA x , last one being 4/22. Regarding her AF, She feels that the AF was compromising her QOL She has also developed  bleeding and is due for hysterectomy later this spring. Symptoms much improved. She has no sustained arrhythmias since her last ablation. She has developed fatigue and Fe deficient anemia. I offered LAAO due to elevated bleeding and stroke risks. The patient can tolerate short term OAC but is not a candidate for long term OAC due to recurrent bleeding issues. I discussed management options regarding LAAO. I discussed the process of LAAO via Watchman including pre-procedure testing  as well as the need to take OAC for 6 weeks post implant. We discussed post procedure REENA studies to assess EMILIANO occlusion. Additionally I mentioned the need to take DAPT up to the 6 month point post implant.     S/P 27MM watchman FLX implantation with 20% compression. Groins with no hematoma. Plan for discharge today with 45 day post watchman REENA.      Past Medical History:   Diagnosis Date    Anticoagulant long-term use     Arthritis     Atrial fibrillation     cardioversion    Atrial fibrillation with RVR     Avascular necrosis     L hand    CHF (congestive heart failure)     Depression     Diabetes mellitus     Encounter for blood transfusion 07/22/2020    Encounter for blood transfusion 03/2022    Fatty liver     GERD (gastroesophageal reflux disease)     Hx of psychiatric care     Hypertension      Iron deficiency anemia 05/07/2022    TIBC 444 with saturated iron 8    Liver disease     Obese     Pre-diabetes 05/07/2022    Psychiatric problem     Sleep apnea     wears cpap       Past Surgical History:   Procedure Laterality Date    ABLATION OF ARRHYTHMOGENIC FOCUS FOR ATRIAL FIBRILLATION N/A 07/20/2020    Procedure: Ablation atrial fibrillation;  Surgeon: Gabriel Hawley MD;  Location: [facility] EP LAB;  Service: Cardiology;  Laterality: N/A;  afib, PVI, RFA, REENA, SHADY, anes, MB, 3 Prep    ABLATION OF ARRHYTHMOGENIC FOCUS FOR ATRIAL FIBRILLATION N/A 01/24/2022    Procedure: Ablation atrial fibrillation;  Surgeon: Gabriel Hawley MD;  Location: [facility] EP LAB;  Service: Cardiology;  Laterality: N/A;  afib/afl, PVI (re-do)/CTI, RFA, REENA (cx if SR), SHADY, anes, MB, 3 Prep    APPLICATION OF WOUND VACUUM-ASSISTED CLOSURE DEVICE Left 08/03/2020    Procedure: APPLICATION, WOUND VAC;  Surgeon: SEMAJ Márquez III, MD;  Location: [facility] OR 2ND FLR;  Service: Peripheral Vascular;  Laterality: Left;    APPLICATION OF WOUND VACUUM-ASSISTED CLOSURE DEVICE Left 08/06/2020    Procedure: APPLICATION, WOUND VAC;  Surgeon: SEMAJ Márquez III, MD;  Location: [facility] OR 2ND FLR;  Service: Peripheral Vascular;  Laterality: Left;    CARPAL TUNNEL RELEASE Right 06/10/2020    Procedure: RELEASE, CARPAL TUNNEL - RIGHT;  Surgeon: Adelaida Hall MD;  Location: [facility] OR;  Service: Orthopedics;  Laterality: Right;  GENERAL AND REGIONAL    CARPAL TUNNEL RELEASE Left 05/05/2021    Procedure: RELEASE, CARPAL TUNNEL, LEFT;  Surgeon: Adelaida Hall MD;  Location: [facility] OR;  Service: Orthopedics;  Laterality: Left;  GENERAL & REGIONAL IN PACU    CLOSURE OF WOUND Left 08/06/2020    Procedure: CLOSURE, WOUND;  Surgeon: SEMAJ Márquez III, MD;  Location: [facility] OR 2ND FLR;  Service: Peripheral Vascular;  Laterality: Left;  Complex    COLONOSCOPY N/A 08/17/2021    Procedure: COLONOSCOPY Suprep;  Surgeon: Loco Deluca,  MD;  Location: [facility];  Service: Endoscopy;  Laterality: N/A;    ESOPHAGOGASTRODUODENOSCOPY N/A 08/17/2021    Procedure: EGD (ESOPHAGOGASTRODUODENOSCOPY);  Surgeon: Loco Deluca MD;  Location: [facility];  Service: Endoscopy;  Laterality: N/A;  Patient is schedule to have her Covid test on 08/14/2021 at the [facility] @ 9:30am. [place].    EXPLORATION OF FEMORAL ARTERY Left 07/21/2020    Procedure: EXPLORATION, ARTERY, FEMORAL;  Surgeon: SEMAJ Márquez III, MD;  Location: [address];  Service: Peripheral Vascular;  Laterality: Left;  1. Urgent Direct repair, L SFA branch laceration    FOOT SURGERY      HYSTERECTOMY      HYSTEROSCOPY WITH DILATION AND CURETTAGE OF UTERUS N/A 02/19/2022    Procedure: HYSTEROSCOPY, WITH DILATION AND CURETTAGE OF UTERUS;  Surgeon: Shane Palomo MD;  Location: [address];  Service: OB/GYN;  Laterality: N/A;    INCISION AND DRAINAGE OF KNEE Left 05/12/2022    Procedure: INCISION AND DRAINAGE, Prepatellar bursectomy.;  Surgeon: Dre Guzmán MD;  Location: [address];  Service: Orthopedics;  Laterality: Left;    LEFT HEART CATHETERIZATION Left 02/07/2023    Procedure: Left heart cath;  Surgeon: Josiah Terry MD;  Location: [facility] CATH LAB;  Service: Cardiology;  Laterality: Left;  borderline moderate bleeding risk 6.3%    OCCLUSION OF LEFT ATRIAL APPENDAGE N/A 06/12/2023    Procedure: Left atrial appendage occlusion;  Surgeon: Gabriel Hawley MD;  Location: [facility] EP LAB;  Service: Cardiology;  Laterality: N/A;  afib, watchman, BSCI, demi, anes, MB, 3prep    ROBOT-ASSISTED LAPAROSCOPIC ABDOMINAL HYSTERECTOMY USING DA KEIRY XI N/A 08/09/2022    Procedure: XI ROBOTIC HYSTERECTOMY;  Surgeon: Yolanda Barriga MD;  Location: [facility];  Service: OB/GYN;  Laterality: N/A;  dual console requested    ROBOT-ASSISTED LAPAROSCOPIC SALPINGO-OOPHORECTOMY Bilateral 08/09/2022    Procedure: ROBOTIC SALPINGO-OOPHORECTOMY;  Surgeon: Yolanda Barriga MD;   Location: [facility] OR;  Service: OB/GYN;  Laterality: Bilateral;    TRANSESOPHAGEAL ECHOCARDIOGRAPHY N/A 06/12/2023    Procedure: ECHOCARDIOGRAM, TRANSESOPHAGEAL;  Surgeon: Cyril Mast MD;  Location: [facility] EP LAB;  Service: Cardiology;  Laterality: N/A;    TREATMENT OF CARDIAC ARRHYTHMIA N/A 01/29/2020    Procedure: CARDIOVERSION;  Surgeon: Gabriel Hawley MD;  Location: [facility] EP LAB;  Service: Cardiology;  Laterality: N/A;  af, demi, dccv, anes, mb, 345    TREATMENT OF CARDIAC ARRHYTHMIA  01/24/2022    Procedure: Cardioversion or Defibrillation;  Surgeon: Gabriel Hawley MD;  Location: [facility] EP LAB;  Service: Cardiology;;    WOUND DEBRIDEMENT Left 08/06/2020    Procedure: DEBRIDEMENT, WOUND;  Surgeon: SEMAJ Márquez III, MD;  Location: [facility] 2ND FLR;  Service: Peripheral Vascular;  Laterality: Left;       Review of patient's allergies indicates:   Allergen Reactions    Flecainide Shortness Of Breath and Swelling    Shellfish containing products Other (See Comments)     Makes gout terrible    Vancomycin analogues Hives, Itching and Rash       Current Facility-Administered Medications on File Prior to Encounter   Medication    sodium chloride 0.9% bolus 1,000 mL     Current Outpatient Medications on File Prior to Encounter   Medication Sig    ALPRAZolam (XANAX) 0.5 MG tablet Take 1 tablet (0.5 mg total) by mouth 2 (two) times daily as needed for Anxiety.    apixaban (ELIQUIS) 5 mg Tab Take 1 tablet (5 mg total) by mouth 2 (two) times daily.    aspirin (ECOTRIN) 81 MG EC tablet Take 81 mg by mouth once daily.    b complex vitamins capsule Take 1 capsule by mouth once daily.    DULoxetine (CYMBALTA) 60 MG capsule Take 2 capsules (120 mg total) by mouth once daily.    furosemide (LASIX) 40 MG tablet Take 1 tablet (40 mg total) by mouth 2 (two) times daily. Resume as needed for edema until followup with your PCP.    gluc-shikha[facility] [id number] 750 mg-644 mg- 30 mg-1 mg Tab Take 1 tablet by  mouth once daily.     ibuprofen (ADVIL,MOTRIN) 600 MG tablet Take 1 tablet (600 mg total) by mouth every 6 (six) hours as needed for Pain.    krill/om-3/dha/epa/phospho/ast (MEGARED OMEGA-3 KRILL OIL ORAL) Take 1 capsule by mouth once daily.    metoprolol succinate (TOPROL-XL) 50 MG 24 hr tablet Take 4 tablets (200 mg total) by mouth once daily.    multivitamin (THERAGRAN) per tablet Take 1 tablet by mouth once daily.    NIFEdipine (PROCARDIA-XL) 90 MG (OSM) 24 hr tablet Take 1 tablet (90 mg total) by mouth once daily. HOLD UNTIL FOLLOW-UP WITH YOUR PCP.    potassium chloride SA (K-DUR,KLOR-CON) 20 MEQ tablet Take 1 tablet (20 mEq total) by mouth once daily. ONLY TAKE WITH LASIX.    propafenone (RYTHMOL) 225 MG Tab Take 1 tablet (225 mg total) by mouth every 8 (eight) hours.    SENNA 8.6 mg tablet Take 1 tablet by mouth 2 (two) times daily. (Patient taking differently: Take 1 tablet by mouth 2 (two) times daily. prn)    traZODone (DESYREL) 100 MG tablet Take 1 tablet (100 mg total) by mouth nightly as needed for Insomnia.    albuterol (VENTOLIN HFA) 90 mcg/actuation inhaler Inhale 2 puffs into the lungs every 6 (six) hours as needed for Wheezing. Rescue    atorvastatin (LIPITOR) 80 MG tablet Take 1 tablet (80 mg total) by mouth once daily.    ferrous sulfate (SLOW RELEASE IRON) 142 mg (45 mg iron) TbSR Take 1 tablet (142 mg total) by mouth once daily. FOR 7 DAY THEN INCREASE TO TWICE DAILY AS TOLERATED    melatonin 10 mg Tab Take 1-2 tablets by mouth nightly as needed (Sleep).    walker (ULTRA-LIGHT ROLLATOR) Misc As directed    [DISCONTINUED] medroxyPROGESTERone (PROVERA) 10 MG tablet Take 2 tablets (20 mg total) by mouth 3 (three) times daily.    [DISCONTINUED] pantoprazole (PROTONIX) 40 MG tablet Take 1 tablet (40 mg total) by mouth once daily. (Patient not taking: Reported on 2/18/2022)     Family History       Problem Relation (Age of Onset)    Cataracts Mother    Diabetes Father    Hypertension  Mother, Father, Sister, Brother    Thyroid disease Mother          Tobacco Use    Smoking status: Former     Types: Cigarettes     Quit date: 2019     Years since quittin.0    Smokeless tobacco: Never   Substance and Sexual Activity    Alcohol use: No    Drug use: No    Sexual activity: Not Currently     Partners: Male     Birth control/protection: See Surgical Hx     Review of Systems   Constitutional: Negative for fever and malaise/fatigue.   HENT:  Negative for congestion and sore throat.    Eyes:  Negative for blurred vision, vision loss in left eye and vision loss in right eye.   Cardiovascular:  Negative for chest pain, dyspnea on exertion, irregular heartbeat, leg swelling, near-syncope, palpitations and syncope.   Respiratory:  Negative for shortness of breath and wheezing.    Endocrine: Negative.    Hematologic/Lymphatic: Negative.    Skin: Negative.    Musculoskeletal:  Negative for arthritis, back pain, joint pain and myalgias.   Gastrointestinal:  Negative for abdominal pain, hematemesis, hematochezia and melena.   Genitourinary: Negative.    Neurological:  Negative for light-headedness and loss of balance.   Psychiatric/Behavioral: Negative.     Objective:     Vital Signs (Most Recent):    Vital Signs (24h Range):           There is no height or weight on file to calculate BMI.            No intake or output data in the 24 hours ending 23 0730    Lines/Drains/Airways       None                    Physical Exam  Vitals and nursing note reviewed.   Constitutional:       Appearance: Normal appearance. She is normal weight.   HENT:      Head: Atraumatic.      Mouth/Throat:      Mouth: Mucous membranes are moist.   Eyes:      General: No scleral icterus.     Extraocular Movements: Extraocular movements intact.   Neck:      Vascular: No carotid bruit.   Cardiovascular:      Rate and Rhythm: Normal rate and regular rhythm.      Pulses: Normal pulses.      Heart sounds: Normal heart sounds.  "No murmur heard.    No gallop.   Pulmonary:      Effort: Pulmonary effort is normal. No respiratory distress.      Breath sounds: Normal breath sounds. No wheezing.   Abdominal:      General: Abdomen is flat. Bowel sounds are normal.      Palpations: Abdomen is soft.   Musculoskeletal:         General: Normal range of motion.      Right lower leg: No edema.      Left lower leg: No edema.   Skin:     General: Skin is warm and dry.      Capillary Refill: Capillary refill takes less than 2 seconds.   Neurological:      General: No focal deficit present.      Mental Status: She is alert and oriented to person, place, and time. Mental status is at baseline.        Significant Labs: All pertinent lab results from the last 24 hours have been reviewed.    Significant Imaging: Echocardiogram: Transthoracic echo (TTE) complete (Cupid Only):   Results for orders placed or performed during the hospital encounter of 10/10/22   Echo   Result Value Ref Range    BSA 2.39 m2    TDI SEPTAL 0.06 m/s    LV LATERAL E/E' RATIO 12.25 m/s    LV SEPTAL E/E' RATIO 16.33 m/s    LA WIDTH 5.11 cm    TDI LATERAL 0.08 m/s    LVIDd 5.44 3.5 - 6.0 cm    IVS 1.00 0.6 - 1.1 cm    Posterior Wall 0.94 0.6 - 1.1 cm    LVIDs 4.01 (A) 2.1 - 4.0 cm    FS 26 28 - 44 %    LA volume 99.92 cm3    Sinus 3.29 cm    STJ 2.77 cm    Ascending aorta 3.34 cm    LV mass 201.08 g    LA size 3.60 cm    RVDD 3.58 cm    TAPSE 2.18 cm    RV S' 10.32 cm/s    Left Ventricle Relative Wall Thickness 0.35 cm    AV mean gradient 14 mmHg    AV valve area 1.66 cm2    AV Velocity Ratio 0.50     AV index (prosthetic) 0.56     MV valve area p 1/2 method 3.97 cm2    E/A ratio 1.56     Mean e' 0.07 m/s    E wave deceleration time 191.18 msec    IVRT 91.34 msec    MV "A" wave duration 22.45 msec    Pulm vein S/D ratio 0.53     LVOT diameter 1.94 cm    LVOT area 3.0 cm2    LVOT peak carlee 1.47 m/s    LVOT peak VTI 35.36 cm    Ao peak carlee 2.94 m/s    Ao VTI 62.88 cm    LVOT stroke volume " 104.47 cm3    AV peak gradient 35 mmHg    E/E' ratio 14.00 m/s    MV Peak E Eulalio 0.98 m/s    TR Max Eulalio 2.81 m/s    MV stenosis pressure 1/2 time 55.44 ms    MV Peak A Eulalio 0.63 m/s    PV Peak S Eulalio 0.37 m/s    PV Peak D Eulalio 0.70 m/s    LV Systolic Volume 70.28 mL    LV Systolic Volume Index 30.8 mL/m2    LV Diastolic Volume 143.81 mL    LV Diastolic Volume Index 63.07 mL/m2    LA Volume Index 43.8 mL/m2    LV Mass Index 88 g/m2    RA Major Axis 4.90 cm    Left Atrium Minor Axis 6.38 cm    Left Atrium Major Axis 6.40 cm    Triscuspid Valve Regurgitation Peak Gradient 32 mmHg    LA Volume Index (Mod) 59.3 mL/m2    LA volume (mod) 135.26 cm3    RA Width 3.59 cm    Right Atrial Pressure (from IVC) 3 mmHg    EF 60 %    TV rest pulmonary artery pressure 35 mmHg    Narrative    · The left ventricle is mildly enlarged with normal systolic function. The   estimated ejection fraction is 60%.  · Normal right ventricular size with normal right ventricular systolic   function.  · Grade II left ventricular diastolic dysfunction.  · Moderate left atrial enlargement.  · There is mild aortic valve stenosis.  · Aortic valve area is 1.66 cm2; peak velocity is 2.94 m/s; mean gradient   is 14 mmHg.  · The estimated PA systolic pressure is 35 mmHg.  · Normal central venous pressure (3 mmHg).        Assessment and Plan:     * Atrial Fibrillation (paroxysmal)  1. REENA for evaluation of Watchman implant  -No absolute contraindications of esophageal stricture, tumor, perforation, laceration,or diverticulum and/or active GI bleed  -The risks, benefits & alternatives of the procedure were explained to the patient.   -The risks of transesophageal echo include but are not limited to:  Dental trauma, esophageal trauma/perforation, bleeding, laryngospasm/brochospasm, aspiration, sore throat/hoarseness, & dislodgement of the endotracheal tube/nasogastric tube (where applicable).    -The risks of moderate sedation include hypotension, respiratory  depression, arrhythmias, bronchospasm, & death.    -Informed consent was obtained. The patient is agreeable to proceed with the procedure and all questions and concerns addressed.    Case discussed with an attending in echocardiography lab.     Further recommendations per attending addendum          VTE Risk Mitigation (From admission, onward)    None          Tico Howell MD  Cardiology   [facility] - Short Stay Cardiac Unit

## 2023-07-24 NOTE — ASSESSMENT & PLAN NOTE
1. REENA for evaluation of Watchman implant  -No absolute contraindications of esophageal stricture, tumor, perforation, laceration,or diverticulum and/or active GI bleed  -The risks, benefits & alternatives of the procedure were explained to the patient.   -The risks of transesophageal echo include but are not limited to:  Dental trauma, esophageal trauma/perforation, bleeding, laryngospasm/brochospasm, aspiration, sore throat/hoarseness, & dislodgement of the endotracheal tube/nasogastric tube (where applicable).    -The risks of moderate sedation include hypotension, respiratory depression, arrhythmias, bronchospasm, & death.    -Informed consent was obtained. The patient is agreeable to proceed with the procedure and all questions and concerns addressed.    Case discussed with an attending in echocardiography lab.     Further recommendations per attending addendum

## 2023-07-24 NOTE — TRANSFER OF CARE
"Anesthesia Transfer of Care Note    Patient: Vicky Alvarado    Procedure(s) Performed: Procedure(s) (LRB):  Transesophageal echo (REENA) intra-procedure log documentation (N/A)    Patient location: PACU    Anesthesia Type: general    Transport from OR: Transported from OR on room air with adequate spontaneous ventilation    Post pain: adequate analgesia    Post assessment: no apparent anesthetic complications    Post vital signs: stable    Level of consciousness: awake    Nausea/Vomiting: no nausea/vomiting    Complications: none    Transfer of care protocol was followed      Last vitals:   Visit Vitals  BP (!) 112/56 (BP Location: Right arm, Patient Position: Lying)   Pulse 70   Temp 36.2 °C (97.2 °F) (Temporal)   Resp 18   Ht 5' 3.5" (1.613 m)   Wt 115.7 kg (255 lb)   LMP 03/25/2022 (Exact Date)   SpO2 96%   Breastfeeding No   BMI 44.46 kg/m²     "

## 2023-07-24 NOTE — HOSPITAL COURSE
REENA performed.  Showing 2mm peridevice leak, device well seated and good compressions.  Pt tolerated the procedure and recovered without any issues.  Discussed case with Dr. Hawley, her eliquis was stopped and to continue ASA 81mg qD.  She was started on plavix 75mg qD.  Pt discharged home.

## 2023-07-27 NOTE — PROGRESS NOTES
Subjective:       Patient ID: Vicky Alvarado is a 58 y.o. female.    Chief Complaint:   Pain of the Right Knee and Pain of the Left Knee  She comes in for bilateral knee pain.  She does not have pain at rest just now, but when she walks it is 9/10 with crunching.  She has night pain interfering with sleep.  The right side and left side hurt about equally badly.  She is had this pain for years.  In May of 2022, she had septic prepatellar bursitis which was treated with IV vancomycin and oral antibiotics.  This was done elsewhere and she is now off antibiotics.  She currently uses only Tylenol and ibuprofen for pain.  She did have an injection about 20 years ago.  No groin pain, distal numbness or tingling.  No fall, accident, injury relevant to this pain.      Past Medical History:   Diagnosis Date    Anticoagulant long-term use     Arthritis     Atrial fibrillation     cardioversion    Atrial fibrillation with RVR     Avascular necrosis     L hand    CHF (congestive heart failure)     Depression     Diabetes mellitus     Encounter for blood transfusion 07/22/2020    Encounter for blood transfusion 03/2022    Fatty liver     GERD (gastroesophageal reflux disease)     Hx of psychiatric care     Hypertension     Iron deficiency anemia 05/07/2022    TIBC 444 with saturated iron 8    Liver disease     Obese     Pre-diabetes 05/07/2022    Psychiatric problem     Sleep apnea     wears cpap     Past Surgical History:   Procedure Laterality Date    ABLATION OF ARRHYTHMOGENIC FOCUS FOR ATRIAL FIBRILLATION N/A 07/20/2020    Procedure: Ablation atrial fibrillation;  Surgeon: Gabriel Hawley MD;  Location: [facility] EP LAB;  Service: Cardiology;  Laterality: N/A;  afib, PVI, RFA, REENA, SHADY, anes, MB, 3 Prep    ABLATION OF ARRHYTHMOGENIC FOCUS FOR ATRIAL FIBRILLATION N/A 01/24/2022    Procedure: Ablation atrial fibrillation;  Surgeon: Gabriel Hawley MD;  Location: [facility] EP LAB;  Service: Cardiology;  Laterality: N/A;   afib/afl, PVI (re-do)/CTI, RFA, REENA (cx if SR), SHADY, anes, MB, 3 Prep    APPLICATION OF WOUND VACUUM-ASSISTED CLOSURE DEVICE Left 08/03/2020    Procedure: APPLICATION, WOUND VAC;  Surgeon: SEMAJ Márquez III, MD;  Location: [facility] OR 2ND FLR;  Service: Peripheral Vascular;  Laterality: Left;    APPLICATION OF WOUND VACUUM-ASSISTED CLOSURE DEVICE Left 08/06/2020    Procedure: APPLICATION, WOUND VAC;  Surgeon: SEMAJ Márquez III, MD;  Location: [facility] OR 2ND FLR;  Service: Peripheral Vascular;  Laterality: Left;    CARPAL TUNNEL RELEASE Right 06/10/2020    Procedure: RELEASE, CARPAL TUNNEL - RIGHT;  Surgeon: Adelaida Hall MD;  Location: [facility] OR;  Service: Orthopedics;  Laterality: Right;  GENERAL AND REGIONAL    CARPAL TUNNEL RELEASE Left 05/05/2021    Procedure: RELEASE, CARPAL TUNNEL, LEFT;  Surgeon: Adelaida Hall MD;  Location: [facility] OR;  Service: Orthopedics;  Laterality: Left;  GENERAL & REGIONAL IN PACU    CLOSURE OF WOUND Left 08/06/2020    Procedure: CLOSURE, WOUND;  Surgeon: SEMAJ Márquez III, MD;  Location: [facility] OR 2ND FLR;  Service: Peripheral Vascular;  Laterality: Left;  Complex    COLONOSCOPY N/A 08/17/2021    Procedure: COLONOSCOPY Suprep;  Surgeon: Loco Deluca MD;  Location: [facility];  Service: Endoscopy;  Laterality: N/A;    ECHOCARDIOGRAM,TRANSESOPHAGEAL N/A 7/24/2023    Procedure: Transesophageal echo (REENA) intra-procedure log documentation;  Surgeon: [facility];  Location: [facility] EP LAB;  Service: Cardiology;  Laterality: N/A;  s/p WM, REENA, anes, 3 Prep    ESOPHAGOGASTRODUODENOSCOPY N/A 08/17/2021    Procedure: EGD (ESOPHAGOGASTRODUODENOSCOPY);  Surgeon: Loco Deluca MD;  Location: [facility] ENDO;  Service: Endoscopy;  Laterality: N/A;  Patient is schedule to have her Covid test on 08/14/2021 at the [facility] @ 9:30am. [place].    EXPLORATION OF FEMORAL ARTERY Left 07/21/2020    Procedure: EXPLORATION, ARTERY, FEMORAL;  Surgeon: SEMAJ Márquez III,  MD;  Location: [address];  Service: Peripheral Vascular;  Laterality: Left;  1. Urgent Direct repair, L SFA branch laceration    FOOT SURGERY      HYSTERECTOMY      HYSTEROSCOPY WITH DILATION AND CURETTAGE OF UTERUS N/A 02/19/2022    Procedure: HYSTEROSCOPY, WITH DILATION AND CURETTAGE OF UTERUS;  Surgeon: Shane Palomo MD;  [address];  Service: OB/GYN;  Laterality: N/A;    INCISION AND DRAINAGE OF KNEE Left 05/12/2022    Procedure: INCISION AND DRAINAGE, Prepatellar bursectomy.;  Surgeon: Dre Guzmán MD;  Location: [address];  Service: Orthopedics;  Laterality: Left;    LEFT HEART CATHETERIZATION Left 02/07/2023    Procedure: Left heart cath;  Surgeon: Josiah Terry MD;  Location: [facility] CATH LAB;  Service: Cardiology;  Laterality: Left;  borderline moderate bleeding risk 6.3%    OCCLUSION OF LEFT ATRIAL APPENDAGE N/A 06/12/2023    Procedure: Left atrial appendage occlusion;  Surgeon: Gabriel Hawley MD;  Location: [facility] EP LAB;  Service: Cardiology;  Laterality: N/A;  afib, watchman, BSCI, demi, fred, MB, 3prep    ROBOT-ASSISTED LAPAROSCOPIC ABDOMINAL HYSTERECTOMY USING DA KEIRY XI N/A 08/09/2022    Procedure: XI ROBOTIC HYSTERECTOMY;  Surgeon: Yolanda Barriga MD;  Location: [facility];  Service: OB/GYN;  Laterality: N/A;  dual console requested    ROBOT-ASSISTED LAPAROSCOPIC SALPINGO-OOPHORECTOMY Bilateral 08/09/2022    Procedure: ROBOTIC SALPINGO-OOPHORECTOMY;  Surgeon: Yolanda Barriga MD;  Location: [facility];  Service: OB/GYN;  Laterality: Bilateral;    TRANSESOPHAGEAL ECHOCARDIOGRAPHY N/A 06/12/2023    Procedure: ECHOCARDIOGRAM, TRANSESOPHAGEAL;  Surgeon: Cyril Mast MD;  Location: [facility] EP LAB;  Service: Cardiology;  Laterality: N/A;    TREATMENT OF CARDIAC ARRHYTHMIA N/A 01/29/2020    Procedure: CARDIOVERSION;  Surgeon: Gabriel Hawley MD;  Location: [facility] EP LAB;  Service: Cardiology;  Laterality: N/A;  af, demi, dccv, anes, mb, 345    TREATMENT OF CARDIAC  ARRHYTHMIA  2022    Procedure: Cardioversion or Defibrillation;  Surgeon: Gabriel Hawley MD;  Location: [facility] EP LAB;  Service: Cardiology;;    WOUND DEBRIDEMENT Left 2020    Procedure: DEBRIDEMENT, WOUND;  Surgeon: SEMAJ Márqeuz III, MD;  [facility];  Service: Peripheral Vascular;  Laterality: Left;     Family History   Problem Relation Age of Onset    Cataracts Mother     Hypertension Mother     Thyroid disease Mother     Diabetes Father     Hypertension Father     Hypertension Sister     Hypertension Brother     Amblyopia Neg Hx     Blindness Neg Hx     Cancer Neg Hx     Glaucoma Neg Hx     Macular degeneration Neg Hx     Retinal detachment Neg Hx     Strabismus Neg Hx     Stroke Neg Hx     Breast cancer Neg Hx     Colon cancer Neg Hx     Ovarian cancer Neg Hx      Social History     Socioeconomic History    Marital status:    Tobacco Use    Smoking status: Former     Current packs/day: 0.00     Types: Cigarettes     Quit date: 2019     Years since quittin.0    Smokeless tobacco: Never   Substance and Sexual Activity    Alcohol use: No    Drug use: No    Sexual activity: Not Currently     Partners: Male     Birth control/protection: See Surgical Hx     Social Determinants of Health     Financial Resource Strain: Low Risk  (2022)    Overall Financial Resource Strain (CARDIA)     Difficulty of Paying Living Expenses: Not very hard   Food Insecurity: No Food Insecurity (2022)    Hunger Vital Sign     Worried About Running Out of Food in the Last Year: Never true     Ran Out of Food in the Last Year: Never true   Transportation Needs: No Transportation Needs (2022)    PRAPARE - Transportation     Lack of Transportation (Medical): No     Lack of Transportation (Non-Medical): No   Physical Activity: Inactive (2022)    Exercise Vital Sign     Days of Exercise per Week: 0 days     Minutes of Exercise per Session: 0 min   Stress: Stress Concern Present  (6/16/2022)    [employer] - Occupational Stress Questionnaire     Feeling of Stress : To some extent   Social Connections: Moderately Isolated (6/16/2022)    Social Connection and Isolation Panel [NHANES]     Frequency of Communication with Friends and Family: More than three times a week     Frequency of Social Gatherings with Friends and Family: More than three times a week     Attends [employer] Services: 1 to 4 times per year     Active Member of Clubs or Organizations: No     Attends Club or Organization Meetings: Never     Marital Status:    Housing Stability: Unknown (6/16/2022)    Housing Stability Vital Sign     Unable to Pay for Housing in the Last Year: No     Unstable Housing in the Last Year: No       Current Outpatient Medications   Medication Sig Dispense Refill    albuterol (VENTOLIN HFA) 90 mcg/actuation inhaler Inhale 2 puffs into the lungs every 6 (six) hours as needed for Wheezing. Rescue 18 g 0    ALPRAZolam (XANAX) 0.5 MG tablet Take 1 tablet (0.5 mg total) by mouth 2 (two) times daily as needed for Anxiety. 60 tablet 4    atorvastatin (LIPITOR) 80 MG tablet Take 1 tablet (80 mg total) by mouth once daily. 90 tablet 3    b complex vitamins capsule Take 1 capsule by mouth once daily.      colchicine (COLCRYS) 0.6 mg tablet For gout attack: Take TWO tablets by mouth, then, 2 hours later, take ONE additional tablet. Then take 1 tablet twice daily only if still needed for gout pain. 20 tablet 4    DULoxetine (CYMBALTA) 60 MG capsule Take 2 capsules (120 mg total) by mouth once daily. 180 capsule 3    ferrous sulfate (SLOW RELEASE IRON) 142 mg (45 mg iron) TbSR Take 1 tablet (142 mg total) by mouth once daily. FOR 7 DAY THEN INCREASE TO TWICE DAILY AS TOLERATED      furosemide (LASIX) 40 MG tablet Take 1 tablet (40 mg total) by mouth 2 (two) times daily. Resume as needed for edema until followup with your PCP. 60 tablet 11    gluc-[facility] [id number] 750  mg-644 mg- 30 mg-1 mg Tab Take 1 tablet by mouth once daily.       ibuprofen (ADVIL,MOTRIN) 600 MG tablet Take 1 tablet (600 mg total) by mouth every 6 (six) hours as needed for Pain. 30 tablet 1    krill/om-3/dha/epa/phospho/ast (MEGARED OMEGA-3 KRILL OIL ORAL) Take 1 capsule by mouth once daily.      lisinopriL (PRINIVIL,ZESTRIL) 40 MG tablet Take 1 tablet (40 mg total) by mouth once daily. 90 tablet 3    melatonin 10 mg Tab Take 1-2 tablets by mouth nightly as needed (Sleep).      metoprolol succinate (TOPROL-XL) 50 MG 24 hr tablet Take 4 tablets (200 mg total) by mouth once daily. 180 tablet 3    multivitamin (THERAGRAN) per tablet Take 1 tablet by mouth once daily.      NIFEdipine (PROCARDIA-XL) 90 MG (OSM) 24 hr tablet Take 1 tablet (90 mg total) by mouth once daily. HOLD UNTIL FOLLOW-UP WITH YOUR PCP. 30 tablet 11    omeprazole (PRILOSEC) 20 MG capsule TAKE 1 CAPSULE BY MOUTH ONCE DAILY 90 capsule 3    oxyCODONE-acetaminophen (PERCOCET) 7.5-325 mg per tablet Take 1 tablet by mouth every 8 (eight) hours as needed for Pain. 20 tablet 0    potassium chloride SA (K-DUR,KLOR-CON) 20 MEQ tablet Take 1 tablet (20 mEq total) by mouth once daily. ONLY TAKE WITH LASIX. 30 tablet 11    predniSONE (DELTASONE) 10 MG tablet Take 1 tablet by mouth daily 10 tablet 0    propafenone (RYTHMOL) 225 MG Tab Take 1 tablet (225 mg total) by mouth every 8 (eight) hours. 90 tablet 11    SENNA 8.6 mg tablet Take 1 tablet by mouth 2 (two) times daily. (Patient taking differently: Take 1 tablet by mouth 2 (two) times daily. prn) 30 tablet 3    tirzepatide 10 mg/0.5 mL PnIj Inject 10 mg into the skin every 7 days. 4 pen 2    walker (ULTRA-LIGHT ROLLATOR) Misc As directed 1 each 0    aspirin (ECOTRIN) 81 MG EC tablet Take 81 mg by mouth once daily.      clopidogreL (PLAVIX) 75 mg tablet Take 1 tablet (75 mg total) by mouth once daily. 30 tablet 11    traZODone (DESYREL) 100 MG tablet Take 1 tablet (100 mg total) by mouth nightly as needed  "for Insomnia. 90 tablet 3     No current facility-administered medications for this visit.     Review of patient's allergies indicates:   Allergen Reactions    Flecainide Shortness Of Breath and Swelling    Shellfish containing products Other (See Comments)     Makes gout terrible    Vancomycin analogues Hives, Itching and Rash         Objective:      Vitals:    07/21/23 1341   Weight: 116.2 kg (256 lb 1 oz)   Height: 5' 3" (1.6 m)     Physical Exam  There is is a mild valgus deformity, which is partially passively correctable.  Active range of motion is 0-115 on the left, and 0-120 on the right.  Anterior crepitus with active extension.  Patellar mobility is limited.  Boggy synovitis without effusion.  Lateral joint line tenderness predominates.  No instability to varus/valgus/Lachman's stressing.  No pain in the groin with flexion and internal rotation of the hip which is not limited.  Skin intact.  Distal neurovascular intact.  Flip test negative.    Imaging Review:   Weightbearing x-rays done recently showed Kellgren-Pancho grade 4 valgus gonarthrosis bilaterally.  Assessment:   Advanced DJD, knees  Plan:       After discussion, she is not quite ready to proceed with knee replacement currently, especially since she is still working on some problems with her heart rhythm with cardiology.  We will refer her to pain management for further help with her pain while we are heading towards knee replacement.  The patient's pathophysiology was explained in detail with reference to x-rays, models, other visual aids as appropriate.  Treatment options were discussed in detail.  Questions were invited and answered to the patient's satisfaction.    Gabriel Mixon MD  Orthopaedic Surgery    "

## 2023-07-28 DIAGNOSIS — I10 ESSENTIAL HYPERTENSION: Chronic | ICD-10-CM

## 2023-07-28 RX ORDER — POTASSIUM CHLORIDE 20 MEQ/1
20 TABLET, EXTENDED RELEASE ORAL DAILY
Qty: 30 TABLET | Refills: 11 | Status: CANCELLED | OUTPATIENT
Start: 2023-07-28

## 2023-07-30 PROBLEM — M17.0 PRIMARY OSTEOARTHRITIS OF BOTH KNEES: Status: ACTIVE | Noted: 2023-07-30

## 2023-07-31 DIAGNOSIS — I10 ESSENTIAL HYPERTENSION: Chronic | ICD-10-CM

## 2023-07-31 RX ORDER — POTASSIUM CHLORIDE 20 MEQ/1
20 TABLET, EXTENDED RELEASE ORAL DAILY
Qty: 30 TABLET | Refills: 11 | Status: CANCELLED | OUTPATIENT
Start: 2023-07-28

## 2023-08-01 DIAGNOSIS — I10 ESSENTIAL HYPERTENSION: Chronic | ICD-10-CM

## 2023-08-02 RX ORDER — POTASSIUM CHLORIDE 20 MEQ/1
20 TABLET, EXTENDED RELEASE ORAL DAILY
Qty: 30 TABLET | Refills: 11 | OUTPATIENT
Start: 2023-08-02

## 2023-08-08 ENCOUNTER — OFFICE VISIT (OUTPATIENT)
Dept: PAIN MEDICINE | Facility: CLINIC | Age: 58
End: 2023-08-08
Attending: ANESTHESIOLOGY
Payer: COMMERCIAL

## 2023-08-08 ENCOUNTER — TELEPHONE (OUTPATIENT)
Dept: PAIN MEDICINE | Facility: CLINIC | Age: 58
End: 2023-08-08
Payer: COMMERCIAL

## 2023-08-08 VITALS
BODY MASS INDEX: 50.07 KG/M2 | RESPIRATION RATE: 19 BRPM | WEIGHT: 255.06 LBS | DIASTOLIC BLOOD PRESSURE: 70 MMHG | HEART RATE: 80 BPM | HEIGHT: 60 IN | TEMPERATURE: 97 F | SYSTOLIC BLOOD PRESSURE: 102 MMHG

## 2023-08-08 DIAGNOSIS — M17.0 PRIMARY OSTEOARTHRITIS OF BOTH KNEES: Primary | ICD-10-CM

## 2023-08-08 DIAGNOSIS — M17.0 PRIMARY OSTEOARTHRITIS OF BOTH KNEES: ICD-10-CM

## 2023-08-08 PROCEDURE — 3078F DIAST BP <80 MM HG: CPT | Mod: CPTII,S$GLB,, | Performed by: ANESTHESIOLOGY

## 2023-08-08 PROCEDURE — 3072F LOW RISK FOR RETINOPATHY: CPT | Mod: CPTII,S$GLB,, | Performed by: ANESTHESIOLOGY

## 2023-08-08 PROCEDURE — 99999 PR PBB SHADOW E&M-EST. PATIENT-LVL III: ICD-10-PCS | Mod: PBBFAC,,, | Performed by: ANESTHESIOLOGY

## 2023-08-08 PROCEDURE — 3074F PR MOST RECENT SYSTOLIC BLOOD PRESSURE < 130 MM HG: ICD-10-PCS | Mod: CPTII,S$GLB,, | Performed by: ANESTHESIOLOGY

## 2023-08-08 PROCEDURE — 1160F RVW MEDS BY RX/DR IN RCRD: CPT | Mod: CPTII,S$GLB,, | Performed by: ANESTHESIOLOGY

## 2023-08-08 PROCEDURE — 4010F PR ACE/ARB THEARPY RXD/TAKEN: ICD-10-PCS | Mod: CPTII,S$GLB,, | Performed by: ANESTHESIOLOGY

## 2023-08-08 PROCEDURE — 99203 PR OFFICE/OUTPT VISIT, NEW, LEVL III, 30-44 MIN: ICD-10-PCS | Mod: S$GLB,,, | Performed by: ANESTHESIOLOGY

## 2023-08-08 PROCEDURE — 3074F SYST BP LT 130 MM HG: CPT | Mod: CPTII,S$GLB,, | Performed by: ANESTHESIOLOGY

## 2023-08-08 PROCEDURE — 1160F PR REVIEW ALL MEDS BY PRESCRIBER/CLIN PHARMACIST DOCUMENTED: ICD-10-PCS | Mod: CPTII,S$GLB,, | Performed by: ANESTHESIOLOGY

## 2023-08-08 PROCEDURE — 4010F ACE/ARB THERAPY RXD/TAKEN: CPT | Mod: CPTII,S$GLB,, | Performed by: ANESTHESIOLOGY

## 2023-08-08 PROCEDURE — 1159F MED LIST DOCD IN RCRD: CPT | Mod: CPTII,S$GLB,, | Performed by: ANESTHESIOLOGY

## 2023-08-08 PROCEDURE — 3078F PR MOST RECENT DIASTOLIC BLOOD PRESSURE < 80 MM HG: ICD-10-PCS | Mod: CPTII,S$GLB,, | Performed by: ANESTHESIOLOGY

## 2023-08-08 PROCEDURE — 3008F BODY MASS INDEX DOCD: CPT | Mod: CPTII,S$GLB,, | Performed by: ANESTHESIOLOGY

## 2023-08-08 PROCEDURE — 3008F PR BODY MASS INDEX (BMI) DOCUMENTED: ICD-10-PCS | Mod: CPTII,S$GLB,, | Performed by: ANESTHESIOLOGY

## 2023-08-08 PROCEDURE — 99203 OFFICE O/P NEW LOW 30 MIN: CPT | Mod: S$GLB,,, | Performed by: ANESTHESIOLOGY

## 2023-08-08 PROCEDURE — 3072F PR LOW RISK FOR RETINOPATHY: ICD-10-PCS | Mod: CPTII,S$GLB,, | Performed by: ANESTHESIOLOGY

## 2023-08-08 PROCEDURE — 99999 PR PBB SHADOW E&M-EST. PATIENT-LVL III: CPT | Mod: PBBFAC,,, | Performed by: ANESTHESIOLOGY

## 2023-08-08 PROCEDURE — 1159F PR MEDICATION LIST DOCUMENTED IN MEDICAL RECORD: ICD-10-PCS | Mod: CPTII,S$GLB,, | Performed by: ANESTHESIOLOGY

## 2023-08-08 NOTE — PROGRESS NOTES
Chronic Pain - New Consult    Referring Physician: Gabriel Mixon MD    Chief Complaint:   Chief Complaint   Patient presents with    Knee Pain        SUBJECTIVE:    Vicky lAvarado presents to the clinic for the evaluation of bl knee pain. The pain started years ago and has continued to  worsening.The pain is located in the bl knee area and radiates to the lower legs.  The pain is described as aching, dull, and throbbing and is rated as 8/10. The pain is rated with a score of  3/10 on the BEST day and a score of 9/10 on the WORST day.  Symptoms interfere with daily activity. The pain is exacerbated by Sitting, Standing, and Getting out of bed/chair.  The pain is mitigated by rest. Pt was referred by Dr. Mixon with orthopedic surgery. She was told that she needs knee replacements but she is not ready for surgery at this time. She is here to be evaluated for conservative procedures for knee pain relief.     Patient denies night fever/night sweats, urinary incontinence, bowel incontinence, significant weight loss, significant motor weakness, and loss of sensations.    Physical Therapy/Home Exercise: no      Pain Disability Index Review:  Last 3 PDI Scores 8/8/2023   Pain Disability Index (PDI) 44       Pain Medications:    - Adjuvant Medications: Advil,Motrin ( Ibuprofen) and tylenol     report:  Not applicable    Pain Procedures: Pt reports steroid injections many years ago    Imaging:   EXAMINATION:  XR KNEE ORTHO BILAT WITH FLEXION     CLINICAL HISTORY:  Pain in right knee     TECHNIQUE:  AP standing of both knees, PA flexion standing views of both knees, and Merchant views of both knees were performed.  Lateral views of both knees were also performed.     COMPARISON:  05/07/2022     FINDINGS:  Both knee joints are narrowed laterally with degenerative change in bony spurring.  Mild patellofemoral degenerative changes seen bilaterally and there is a small joint effusion present on left.      Impression:     See above        Electronically signed by: Gabriel Amador MD  Date:                                            07/11/2023  Time:                                           14:07    Past Medical History:   Diagnosis Date    Anticoagulant long-term use     Arthritis     Atrial fibrillation     cardioversion    Atrial fibrillation with RVR     Avascular necrosis     L hand    CHF (congestive heart failure)     Depression     Diabetes mellitus     Encounter for blood transfusion 07/22/2020    Encounter for blood transfusion 03/2022    Fatty liver     GERD (gastroesophageal reflux disease)     Hx of psychiatric care     Hypertension     Iron deficiency anemia 05/07/2022    TIBC 444 with saturated iron 8    Liver disease     Obese     Pre-diabetes 05/07/2022    Psychiatric problem     Sleep apnea     wears cpap     Past Surgical History:   Procedure Laterality Date    ABLATION OF ARRHYTHMOGENIC FOCUS FOR ATRIAL FIBRILLATION N/A 07/20/2020    Procedure: Ablation atrial fibrillation;  Surgeon: Gabriel Hawley MD;  Location: [facility] EP LAB;  Service: Cardiology;  Laterality: N/A;  afib, PVI, RFA, REENA, SHADY, anes, MB, 3 Prep    ABLATION OF ARRHYTHMOGENIC FOCUS FOR ATRIAL FIBRILLATION N/A 01/24/2022    Procedure: Ablation atrial fibrillation;  Surgeon: Gabriel Hawley MD;  Location: [facility] EP LAB;  Service: Cardiology;  Laterality: N/A;  afib/afl, PVI (re-do)/CTI, RFA, REENA (cx if SR), SHADY, anes, MB, 3 Prep    APPLICATION OF WOUND VACUUM-ASSISTED CLOSURE DEVICE Left 08/03/2020    Procedure: APPLICATION, WOUND VAC;  Surgeon: SEMAJ Márquez III, MD;  [facility];  Service: Peripheral Vascular;  Laterality: Left;    APPLICATION OF WOUND VACUUM-ASSISTED CLOSURE DEVICE Left 08/06/2020    Procedure: APPLICATION, WOUND VAC;  Surgeon: SEMAJ Márquez III, MD;  [facility];  Service: Peripheral Vascular;  Laterality: Left;    CARPAL TUNNEL RELEASE Right 06/10/2020    Procedure: RELEASE,  CARPAL TUNNEL - RIGHT;  Surgeon: Adelaida Hall MD;  Location: [facility] OR;  Service: Orthopedics;  Laterality: Right;  GENERAL AND REGIONAL    CARPAL TUNNEL RELEASE Left 05/05/2021    Procedure: RELEASE, CARPAL TUNNEL, LEFT;  Surgeon: Adelaida Hall MD;  Location: [facility] OR;  Service: Orthopedics;  Laterality: Left;  GENERAL & REGIONAL IN PACU    CLOSURE OF WOUND Left 08/06/2020    Procedure: CLOSURE, WOUND;  Surgeon: SEMAJ Márquez III, MD;  Location: [address];  Service: Peripheral Vascular;  Laterality: Left;  Complex    COLONOSCOPY N/A 08/17/2021    Procedure: COLONOSCOPY Suprep;  Surgeon: Loco Deluca MD;  Location: [facility];  Service: Endoscopy;  Laterality: N/A;    ECHOCARDIOGRAM,TRANSESOPHAGEAL N/A 7/24/2023    Procedure: Transesophageal echo (REENA) intra-procedure log documentation;  [facility];  Location: [facility] EP LAB;  Service: Cardiology;  Laterality: N/A;  s/p WM, REENA, anes, 3 Prep    ESOPHAGOGASTRODUODENOSCOPY N/A 08/17/2021    Procedure: EGD (ESOPHAGOGASTRODUODENOSCOPY);  Surgeon: Loco Deluca MD;  Location: [facility];  Service: Endoscopy;  Laterality: N/A;  Patient is schedule to have her Covid test on 08/14/2021 at the [facility] @ 9:30am. [place].    EXPLORATION OF FEMORAL ARTERY Left 07/21/2020    Procedure: EXPLORATION, ARTERY, FEMORAL;  Surgeon: SEMAJ Márquez III, MD;  Location: [address];  Service: Peripheral Vascular;  Laterality: Left;  1. Urgent Direct repair, L SFA branch laceration    FOOT SURGERY      HYSTERECTOMY      HYSTEROSCOPY WITH DILATION AND CURETTAGE OF UTERUS N/A 02/19/2022    Procedure: HYSTEROSCOPY, WITH DILATION AND CURETTAGE OF UTERUS;  Surgeon: Shane Palomo MD;  Location: [address];  Service: OB/GYN;  Laterality: N/A;    INCISION AND DRAINAGE OF KNEE Left 05/12/2022    Procedure: INCISION AND DRAINAGE, Prepatellar bursectomy.;  Surgeon: Dre Guzmán MD;  Location: [address];  Service:  Orthopedics;  Laterality: Left;    LEFT HEART CATHETERIZATION Left 2023    Procedure: Left heart cath;  Surgeon: Josiah Terry MD;  Location: [facility] CATH LAB;  Service: Cardiology;  Laterality: Left;  borderline moderate bleeding risk 6.3%    OCCLUSION OF LEFT ATRIAL APPENDAGE N/A 2023    Procedure: Left atrial appendage occlusion;  Surgeon: Gabriel Hawley MD;  Location: [facility] EP LAB;  Service: Cardiology;  Laterality: N/A;  afib, watchman, BSCI, demi, ALIYA ibarra, 3prep    ROBOT-ASSISTED LAPAROSCOPIC ABDOMINAL HYSTERECTOMY USING DA KEIRY XI N/A 2022    Procedure: XI ROBOTIC HYSTERECTOMY;  Surgeon: Yolanda Barriga MD;  Location: [facility];  Service: OB/GYN;  Laterality: N/A;  dual console requested    ROBOT-ASSISTED LAPAROSCOPIC SALPINGO-OOPHORECTOMY Bilateral 2022    Procedure: ROBOTIC SALPINGO-OOPHORECTOMY;  Surgeon: Yolanda Barriga MD;  Location: [facility];  Service: OB/GYN;  Laterality: Bilateral;    TRANSESOPHAGEAL ECHOCARDIOGRAPHY N/A 2023    Procedure: ECHOCARDIOGRAM, TRANSESOPHAGEAL;  Surgeon: Cyril Mast MD;  Location: [facility] EP LAB;  Service: Cardiology;  Laterality: N/A;    TREATMENT OF CARDIAC ARRHYTHMIA N/A 2020    Procedure: CARDIOVERSION;  Surgeon: Gabriel Hawley MD;  Location: [facility] EP LAB;  Service: Cardiology;  Laterality: N/A;  af, demi, dccv, fred, mb, 345    TREATMENT OF CARDIAC ARRHYTHMIA  2022    Procedure: Cardioversion or Defibrillation;  Surgeon: Gabriel Hawley MD;  Location: [facility] EP LAB;  Service: Cardiology;;    WOUND DEBRIDEMENT Left 2020    Procedure: DEBRIDEMENT, WOUND;  Surgeon: SEMAJ Márquez III, MD;  Location: [address];  Service: Peripheral Vascular;  Laterality: Left;     Social History     Socioeconomic History    Marital status:    Tobacco Use    Smoking status: Former     Current packs/day: 0.00     Types: Cigarettes     Quit date: 2019     Years since quittin.1    Smokeless tobacco: Never    Substance and Sexual Activity    Alcohol use: No    Drug use: No    Sexual activity: Not Currently     Partners: Male     Birth control/protection: See Surgical Hx     Social Determinants of Health     Financial Resource Strain: Low Risk  (6/16/2022)    Overall Financial Resource Strain (CARDIA)     Difficulty of Paying Living Expenses: Not very hard   Food Insecurity: No Food Insecurity (6/16/2022)    Hunger Vital Sign     Worried About Running Out of Food in the Last Year: Never true     Ran Out of Food in the Last Year: Never true   Transportation Needs: No Transportation Needs (6/16/2022)    PRAPARE - Transportation     Lack of Transportation (Medical): No     Lack of Transportation (Non-Medical): No   Physical Activity: Inactive (6/16/2022)    Exercise Vital Sign     Days of Exercise per Week: 0 days     Minutes of Exercise per Session: 0 min   Stress: Stress Concern Present (6/16/2022)    [employer] [employer] - Occupational Stress Questionnaire     Feeling of Stress : To some extent   Social Connections: Moderately Isolated (6/16/2022)    Social Connection and Isolation Panel [NHANES]     Frequency of Communication with Friends and Family: More than three times a week     Frequency of Social Gatherings with Friends and Family: More than three times a week     Attends Voodoo Services: 1 to 4 times per year     Active Member of Clubs or Organizations: No     Attends Club or Organization Meetings: Never     Marital Status:    Housing Stability: Unknown (6/16/2022)    Housing Stability Vital Sign     Unable to Pay for Housing in the Last Year: No     Unstable Housing in the Last Year: No     Family History   Problem Relation Age of Onset    Cataracts Mother     Hypertension Mother     Thyroid disease Mother     Diabetes Father     Hypertension Father     Hypertension Sister     Hypertension Brother     Amblyopia Neg Hx     Blindness Neg Hx     Cancer Neg Hx     Glaucoma Neg Hx      Macular degeneration Neg Hx     Retinal detachment Neg Hx     Strabismus Neg Hx     Stroke Neg Hx     Breast cancer Neg Hx     Colon cancer Neg Hx     Ovarian cancer Neg Hx        Review of patient's allergies indicates:   Allergen Reactions    Flecainide Shortness Of Breath and Swelling    Shellfish containing products Other (See Comments)     Makes gout terrible    Vancomycin analogues Hives, Itching and Rash       Current Outpatient Medications   Medication Sig    albuterol (VENTOLIN HFA) 90 mcg/actuation inhaler Inhale 2 puffs into the lungs every 6 (six) hours as needed for Wheezing. Rescue    ALPRAZolam (XANAX) 0.5 MG tablet Take 1 tablet (0.5 mg total) by mouth 2 (two) times daily as needed for Anxiety.    aspirin (ECOTRIN) 81 MG EC tablet Take 81 mg by mouth once daily.    atorvastatin (LIPITOR) 80 MG tablet Take 1 tablet (80 mg total) by mouth once daily.    b complex vitamins capsule Take 1 capsule by mouth once daily.    clopidogreL (PLAVIX) 75 mg tablet Take 1 tablet (75 mg total) by mouth once daily.    colchicine (COLCRYS) 0.6 mg tablet For gout attack: Take TWO tablets by mouth, then, 2 hours later, take ONE additional tablet. Then take 1 tablet twice daily only if still needed for gout pain.    DULoxetine (CYMBALTA) 60 MG capsule Take 2 capsules (120 mg total) by mouth once daily.    ferrous sulfate (SLOW RELEASE IRON) 142 mg (45 mg iron) TbSR Take 1 tablet (142 mg total) by mouth once daily. FOR 7 DAY THEN INCREASE TO TWICE DAILY AS TOLERATED    furosemide (LASIX) 40 MG tablet Take 1 tablet (40 mg total) by mouth 2 (two) times daily. Resume as needed for edema until followup with your PCP.    gluc-shikha-[id number] 750 mg-644 mg- 30 mg-1 mg Tab Take 1 tablet by mouth once daily.     ibuprofen (ADVIL,MOTRIN) 600 MG tablet Take 1 tablet (600 mg total) by mouth every 6 (six) hours as needed for Pain.    krill/om-3/dha/epa/phospho/ast (MEGARED OMEGA-3 KRILL OIL ORAL) Take 1 capsule by mouth once  daily.    lisinopriL (PRINIVIL,ZESTRIL) 40 MG tablet Take 1 tablet (40 mg total) by mouth once daily.    melatonin 10 mg Tab Take 1-2 tablets by mouth nightly as needed (Sleep).    metoprolol succinate (TOPROL-XL) 50 MG 24 hr tablet Take 4 tablets (200 mg total) by mouth once daily.    multivitamin (THERAGRAN) per tablet Take 1 tablet by mouth once daily.    NIFEdipine (PROCARDIA-XL) 90 MG (OSM) 24 hr tablet Take 1 tablet (90 mg total) by mouth once daily. HOLD UNTIL FOLLOW-UP WITH YOUR PCP.    omeprazole (PRILOSEC) 20 MG capsule TAKE 1 CAPSULE BY MOUTH ONCE DAILY    oxyCODONE-acetaminophen (PERCOCET) 7.5-325 mg per tablet Take 1 tablet by mouth every 8 (eight) hours as needed for Pain.    potassium chloride SA (K-DUR,KLOR-CON) 20 MEQ tablet Take 1 tablet (20 mEq total) by mouth once daily. ONLY TAKE WITH LASIX.    predniSONE (DELTASONE) 10 MG tablet Take 1 tablet by mouth daily    propafenone (RYTHMOL) 225 MG Tab Take 1 tablet (225 mg total) by mouth every 8 (eight) hours.    SENNA 8.6 mg tablet Take 1 tablet by mouth 2 (two) times daily. (Patient taking differently: Take 1 tablet by mouth 2 (two) times daily. prn)    tirzepatide 10 mg/0.5 mL PnIj Inject 10 mg into the skin every 7 days.    traZODone (DESYREL) 100 MG tablet Take 1 tablet (100 mg total) by mouth nightly as needed for Insomnia.    walker (ULTRA-LIGHT ROLLATOR) Misc As directed     No current facility-administered medications for this visit.       REVIEW OF SYSTEMS:    GENERAL:  No weight loss, malaise or fevers.  HEENT:  Negative for frequent or significant headaches.  NECK:  Negative for lumps, goiter, pain and significant neck swelling.  RESPIRATORY:  Negative for cough, wheezing or shortness of breath.  CARDIOVASCULAR:  Negative for chest pain, leg swelling or palpitations.  GI:  Negative for abdominal discomfort, blood in stools or black stools or change in bowel habits.  MUSCULOSKELETAL:  See HPI.  SKIN:  Negative for lesions, rash, and  itching.  PSYCH:  Negative for sleep disturbance, mood disorder and recent psychosocial stressors.  HEMATOLOGY/LYMPHOLOGY:  Negative for prolonged bleeding, bruising easily or swollen nodes.  NEURO:   No history of headaches, syncope, paralysis, seizures or tremors.  All other reviewed and negative other than HPI.    OBJECTIVE:    /70 (BP Location: Right arm, Patient Position: Sitting)   Pulse 80   Temp 97 °F (36.1 °C) (Oral)   Resp 19   Ht 5' (1.524 m)   Wt 115.7 kg (255 lb 1.2 oz)   LMP 03/25/2022 (Exact Date)   BMI 49.82 kg/m²     PHYSICAL EXAMINATION:    General appearance: Well appearing, in no acute distress, alert and oriented x3.  Psych:  Mood and affect appropriate.  Skin: Skin color, texture, turgor normal, no rashes or lesions, in both upper and lower body. 1+ pitting edema in the lower extremities. Venous ectasia and varicosities in upper and lower extremities bl.   Head/face:  Normocephalic, atraumatic. No palpable lymph nodes.  Neck: No pain to palpation over the cervical paraspinous muscles. Spurling Negative. No pain with neck flexion, extension, or lateral flexion.   Cor: RRR  Pulm: CTA  GI:  Soft and non-tender.  Back: Straight leg raising in the sitting and supine positions is negative to radicular pain. No pain to palpation over the spine or costovertebral angles. Normal range of motion without pain reproduction.  Extremities: Peripheral joint ROM is full and pain free without obvious instability or laxity in all four extremities. No deformities, edema, or skin discoloration. Good capillary refill.  Musculoskeletal: Shoulder, hip, sacroiliac and knee provocative maneuvers are negative. Bilateral upper and lower extremity strength is normal and symmetric. Pt has pain on passive ROM with knees bl. Crepitus felt on passive ROM. No atrophy or tone abnormalities are noted.  Neuro: Bilateral upper and lower extremity coordination and muscle stretch reflexes are physiologic and symmetric.   Plantar response are downgoing. No loss of sensation is noted.  Gait: normal.    ASSESSMENT: 58 y.o. year old female with bl knee pain, consistent with knee osteoarthritis.      1. Primary osteoarthritis of both knees  Ambulatory referral/consult to Pain Clinic            PLAN:   We discussed with the patient the assessment and recommendations. The following is the plan we agreed on:   - Will schedule pt for synvisc injection in clinic after authorization  - Counseled pt on continued use of tylenol PRN. Avoid use of NSAIDS at this time given lower extremity swelling and risk for kidney injury  - Counseled patient regarding the importance of activity modification and continued follow up with primary care for maintenance treatment of blood pressure and CHF management.    The above plan and management options were discussed at length with patient. Patient is in agreement with the above and verbalized understanding. It will be communicated with the referring physician via electronic record, fax, or mail.    Laurie Fuchs PGY-3  [facility]   08/08/2023  I have personally taken the history and examined this patient and agree with the resident's note as stated above.

## 2023-08-16 ENCOUNTER — TELEPHONE (OUTPATIENT)
Dept: ORTHOPEDICS | Facility: CLINIC | Age: 58
End: 2023-08-16
Payer: COMMERCIAL

## 2023-08-16 ENCOUNTER — PATIENT MESSAGE (OUTPATIENT)
Dept: ELECTROPHYSIOLOGY | Facility: CLINIC | Age: 58
End: 2023-08-16
Payer: COMMERCIAL

## 2023-08-16 DIAGNOSIS — M25.532 LEFT WRIST PAIN: Primary | ICD-10-CM

## 2023-08-16 NOTE — TELEPHONE ENCOUNTER
Spoke c pt. Informed pt that Dr. Nazario will no longer be seeing pts morning of 08/22/23. New appt  date 08/22/23 at 11:30 with XR. Confirmed appt location & time. Pt expressed understanding & was thankful.

## 2023-08-22 ENCOUNTER — OFFICE VISIT (OUTPATIENT)
Dept: ORTHOPEDICS | Facility: CLINIC | Age: 58
End: 2023-08-22
Payer: COMMERCIAL

## 2023-08-22 ENCOUNTER — PATIENT MESSAGE (OUTPATIENT)
Dept: ORTHOPEDICS | Facility: CLINIC | Age: 58
End: 2023-08-22

## 2023-08-22 ENCOUNTER — HOSPITAL ENCOUNTER (OUTPATIENT)
Dept: RADIOLOGY | Facility: OTHER | Age: 58
Discharge: HOME | End: 2023-08-22
Attending: ORTHOPAEDIC SURGERY
Payer: COMMERCIAL

## 2023-08-22 DIAGNOSIS — M25.532 LEFT WRIST PAIN: ICD-10-CM

## 2023-08-22 DIAGNOSIS — M25.532 LEFT WRIST PAIN: Primary | ICD-10-CM

## 2023-08-22 DIAGNOSIS — M92.212: ICD-10-CM

## 2023-08-22 DIAGNOSIS — M17.0 PRIMARY OSTEOARTHRITIS OF BOTH KNEES: ICD-10-CM

## 2023-08-22 PROCEDURE — 4010F PR ACE/ARB THEARPY RXD/TAKEN: ICD-10-PCS | Mod: CPTII,S$GLB,, | Performed by: ORTHOPAEDIC SURGERY

## 2023-08-22 PROCEDURE — 3072F PR LOW RISK FOR RETINOPATHY: ICD-10-PCS | Mod: CPTII,S$GLB,, | Performed by: ORTHOPAEDIC SURGERY

## 2023-08-22 PROCEDURE — 3072F LOW RISK FOR RETINOPATHY: CPT | Mod: CPTII,S$GLB,, | Performed by: ORTHOPAEDIC SURGERY

## 2023-08-22 PROCEDURE — 73110 XR WRIST COMPLETE 3 VIEWS LEFT: ICD-10-PCS | Mod: 26,LT,, | Performed by: RADIOLOGY

## 2023-08-22 PROCEDURE — 99999 PR PBB SHADOW E&M-EST. PATIENT-LVL I: CPT | Mod: PBBFAC,,, | Performed by: ORTHOPAEDIC SURGERY

## 2023-08-22 PROCEDURE — 1159F PR MEDICATION LIST DOCUMENTED IN MEDICAL RECORD: ICD-10-PCS | Mod: CPTII,S$GLB,, | Performed by: ORTHOPAEDIC SURGERY

## 2023-08-22 PROCEDURE — 99214 PR OFFICE/OUTPT VISIT, EST, LEVL IV, 30-39 MIN: ICD-10-PCS | Mod: S$GLB,,, | Performed by: ORTHOPAEDIC SURGERY

## 2023-08-22 PROCEDURE — 99999 PR PBB SHADOW E&M-EST. PATIENT-LVL I: ICD-10-PCS | Mod: PBBFAC,,, | Performed by: ORTHOPAEDIC SURGERY

## 2023-08-22 PROCEDURE — 73110 X-RAY EXAM OF WRIST: CPT | Mod: 26,LT,, | Performed by: RADIOLOGY

## 2023-08-22 PROCEDURE — 1159F MED LIST DOCD IN RCRD: CPT | Mod: CPTII,S$GLB,, | Performed by: ORTHOPAEDIC SURGERY

## 2023-08-22 PROCEDURE — 4010F ACE/ARB THERAPY RXD/TAKEN: CPT | Mod: CPTII,S$GLB,, | Performed by: ORTHOPAEDIC SURGERY

## 2023-08-22 PROCEDURE — 73110 X-RAY EXAM OF WRIST: CPT | Mod: TC,FY,LT

## 2023-08-22 PROCEDURE — 99214 OFFICE O/P EST MOD 30 MIN: CPT | Mod: S$GLB,,, | Performed by: ORTHOPAEDIC SURGERY

## 2023-08-22 NOTE — H&P (VIEW-ONLY)
Subjective:      Patient ID: Vicky Alvarado is a 58 y.o. female.    Chief Complaint: Follow-up of the Left Wrist      HPI  Vicky Alvarado is a right hand dominant 58 y.o. female presenting today for pain in her left wrist.  Diagnosed with [place] in the past  Onset of symptoms began in 2020. She has had a left CTR in 2021 and a right CTR in 2021.  She describes it as sharp 7/10 pain when palpated over the lunate. She has discussed a possible PRC with our Physician Assistant in the past. She is currently her today to discuss surgery for [place] disease of her left wrist.  Her current xray is similar to the one she had in 2020 with no changes      Review of patient's allergies indicates:   Allergen Reactions    Flecainide Shortness Of Breath and Swelling    Shellfish containing products Other (See Comments)     Makes gout terrible    Vancomycin analogues Hives, Itching and Rash         Current Outpatient Medications   Medication Sig Dispense Refill    albuterol (VENTOLIN HFA) 90 mcg/actuation inhaler Inhale 2 puffs into the lungs every 6 (six) hours as needed for Wheezing. Rescue 18 g 0    ALPRAZolam (XANAX) 0.5 MG tablet Take 1 tablet (0.5 mg total) by mouth 2 (two) times daily as needed for Anxiety. 60 tablet 4    aspirin (ECOTRIN) 81 MG EC tablet Take 81 mg by mouth once daily.      atorvastatin (LIPITOR) 80 MG tablet Take 1 tablet (80 mg total) by mouth once daily. 90 tablet 3    b complex vitamins capsule Take 1 capsule by mouth once daily.      clopidogreL (PLAVIX) 75 mg tablet Take 1 tablet (75 mg total) by mouth once daily. 30 tablet 11    colchicine (COLCRYS) 0.6 mg tablet For gout attack: Take TWO tablets by mouth, then, 2 hours later, take ONE additional tablet. Then take 1 tablet twice daily only if still needed for gout pain. 20 tablet 4    DULoxetine (CYMBALTA) 60 MG capsule Take 2 capsules (120 mg total) by mouth once daily. 180 capsule 3    furosemide (LASIX) 40 MG tablet Take 1  tablet (40 mg total) by mouth 2 (two) times daily. Resume as needed for edema until followup with your PCP. 60 tablet 11    gluc-[facility] [place]#[id number]-O-sxml-reymundo-bor 750 mg-644 mg- 30 mg-1 mg Tab Take 1 tablet by mouth once daily.       lisinopriL (PRINIVIL,ZESTRIL) 40 MG tablet Take 1 tablet (40 mg total) by mouth once daily. 90 tablet 3    melatonin 10 mg Tab Take 1-2 tablets by mouth nightly as needed (Sleep).      metoprolol succinate (TOPROL-XL) 50 MG 24 hr tablet Take 4 tablets (200 mg total) by mouth once daily. 180 tablet 3    multivitamin (THERAGRAN) per tablet Take 1 tablet by mouth once daily.      NIFEdipine (PROCARDIA-XL) 90 MG (OSM) 24 hr tablet Take 1 tablet (90 mg total) by mouth once daily. HOLD UNTIL FOLLOW-UP WITH YOUR PCP. 30 tablet 11    omeprazole (PRILOSEC) 20 MG capsule TAKE 1 CAPSULE BY MOUTH ONCE DAILY 90 capsule 3    potassium chloride SA (K-DUR,KLOR-CON) 20 MEQ tablet Take 1 tablet (20 mEq total) by mouth once daily. ONLY TAKE WITH LASIX. 30 tablet 11    propafenone (RYTHMOL) 225 MG Tab Take 1 tablet (225 mg total) by mouth every 8 (eight) hours. 90 tablet 11    SENNA 8.6 mg tablet Take 1 tablet by mouth 2 (two) times daily. (Patient taking differently: Take 1 tablet by mouth 2 (two) times daily. prn) 30 tablet 3    tirzepatide 10 mg/0.5 mL PnIj Inject 10 mg into the skin every 7 days. 4 pen 2    traZODone (DESYREL) 100 MG tablet Take 1 tablet (100 mg total) by mouth nightly as needed for Insomnia. 90 tablet 3    walker (ULTRA-LIGHT ROLLATOR) Misc As directed 1 each 0    ferrous sulfate (SLOW RELEASE IRON) 142 mg (45 mg iron) TbSR Take 1 tablet (142 mg total) by mouth once daily. FOR 7 DAY THEN INCREASE TO TWICE DAILY AS TOLERATED (Patient not taking: Reported on 8/22/2023)      ibuprofen (ADVIL,MOTRIN) 600 MG tablet Take 1 tablet (600 mg total) by mouth every 6 (six) hours as needed for Pain. (Patient not taking: Reported on 8/22/2023) 30 tablet 1    krill/om-3/dha/epa/phospho/ast (MEGARED OMEGA-3  KRILL OIL ORAL) Take 1 capsule by mouth once daily.      oxyCODONE-acetaminophen (PERCOCET) 7.5-325 mg per tablet Take 1 tablet by mouth every 8 (eight) hours as needed for Pain. (Patient not taking: Reported on 8/22/2023) 20 tablet 0    predniSONE (DELTASONE) 10 MG tablet Take 1 tablet by mouth daily (Patient not taking: Reported on 8/22/2023) 10 tablet 0     No current facility-administered medications for this visit.       Past Medical History:   Diagnosis Date    Anticoagulant long-term use     Arthritis     Atrial fibrillation     cardioversion    Atrial fibrillation with RVR     Avascular necrosis     L hand    CHF (congestive heart failure)     Depression     Diabetes mellitus     Encounter for blood transfusion 07/22/2020    Encounter for blood transfusion 03/2022    Fatty liver     GERD (gastroesophageal reflux disease)     Hx of psychiatric care     Hypertension     Iron deficiency anemia 05/07/2022    TIBC 444 with saturated iron 8    Liver disease     Obese     Pre-diabetes 05/07/2022    Psychiatric problem     Sleep apnea     wears cpap       Past Surgical History:   Procedure Laterality Date    ABLATION OF ARRHYTHMOGENIC FOCUS FOR ATRIAL FIBRILLATION N/A 07/20/2020    Procedure: Ablation atrial fibrillation;  Surgeon: Gabriel Hawley MD;  Location: [facility] EP LAB;  Service: Cardiology;  Laterality: N/A;  afib, PVI, RFA, REENA, SHADY, anes, MB, 3 Prep    ABLATION OF ARRHYTHMOGENIC FOCUS FOR ATRIAL FIBRILLATION N/A 01/24/2022    Procedure: Ablation atrial fibrillation;  Surgeon: Gabriel Hawley MD;  Location: [facility] EP LAB;  Service: Cardiology;  Laterality: N/A;  afib/afl, PVI (re-do)/CTI, RFA, REENA (cx if SR), SHADY, anes, MB, 3 Prep    APPLICATION OF WOUND VACUUM-ASSISTED CLOSURE DEVICE Left 08/03/2020    Procedure: APPLICATION, WOUND VAC;  Surgeon: SEMAJ Márquez III, MD;  Location: [facility] OR [address] [place] [address];  Service: Peripheral Vascular;  Laterality: Left;    APPLICATION OF WOUND VACUUM-ASSISTED CLOSURE DEVICE  Left 08/06/2020    Procedure: APPLICATION, WOUND VAC;  Surgeon: SEMAJ Márquez III, MD;  Location: [facility] OR 2ND FLR;  Service: Peripheral Vascular;  Laterality: Left;    CARPAL TUNNEL RELEASE Right 06/10/2020    Procedure: RELEASE, CARPAL TUNNEL - RIGHT;  Surgeon: Adelaida Hall MD;  Location: [facility] OR;  Service: Orthopedics;  Laterality: Right;  GENERAL AND REGIONAL    CARPAL TUNNEL RELEASE Left 05/05/2021    Procedure: RELEASE, CARPAL TUNNEL, LEFT;  Surgeon: Adelaida Hall MD;  Location: [facility] OR;  Service: Orthopedics;  Laterality: Left;  GENERAL & REGIONAL IN PACU    CLOSURE OF WOUND Left 08/06/2020    Procedure: CLOSURE, WOUND;  Surgeon: SEMAJ Márquez III, MD;  [address];  Service: Peripheral Vascular;  Laterality: Left;  Complex    COLONOSCOPY N/A 08/17/2021    Procedure: COLONOSCOPY Suprep;  Surgeon: Loco Deluca MD;  Location: [facility];  Service: Endoscopy;  Laterality: N/A;    ECHOCARDIOGRAM,TRANSESOPHAGEAL N/A 7/24/2023    Procedure: Transesophageal echo (REENA) intra-procedure log documentation;  [facility];  Location: [facility] EP LAB;  Service: Cardiology;  Laterality: N/A;  s/p WM, REENA, anes, 3 Prep    ESOPHAGOGASTRODUODENOSCOPY N/A 08/17/2021    Procedure: EGD (ESOPHAGOGASTRODUODENOSCOPY);  Surgeon: Loco Deluca MD;  Location: [facility];  Service: Endoscopy;  Laterality: N/A;  Patient is schedule to have her Covid test on 08/14/2021 at the [facility] @ 9:30am. [place].    EXPLORATION OF FEMORAL ARTERY Left 07/21/2020    Procedure: EXPLORATION, ARTERY, FEMORAL;  Surgeon: SEMAJ Márquez III, MD;  Location: [facility] 2ND FLR;  Service: Peripheral Vascular;  Laterality: Left;  1. Urgent Direct repair, L SFA branch laceration    FOOT SURGERY      HYSTERECTOMY      HYSTEROSCOPY WITH DILATION AND CURETTAGE OF UTERUS N/A 02/19/2022    Procedure: HYSTEROSCOPY, WITH DILATION AND CURETTAGE OF UTERUS;  Surgeon: Shane Palomo MD;  Location: [facility]  OR 2ND FLR;  Service: OB/GYN;  Laterality: N/A;    INCISION AND DRAINAGE OF KNEE Left 05/12/2022    Procedure: INCISION AND DRAINAGE, Prepatellar bursectomy.;  Surgeon: Dre Guzmán MD;  Location: [facility] 2ND FLR;  Service: Orthopedics;  Laterality: Left;    LEFT HEART CATHETERIZATION Left 02/07/2023    Procedure: Left heart cath;  Surgeon: Josiah Terry MD;  Location: [facility] CATH LAB;  Service: Cardiology;  Laterality: Left;  borderline moderate bleeding risk 6.3%    OCCLUSION OF LEFT ATRIAL APPENDAGE N/A 06/12/2023    Procedure: Left atrial appendage occlusion;  Surgeon: Gabriel Hawley MD;  Location: [facility] EP LAB;  Service: Cardiology;  Laterality: N/A;  afib, watchman, BSCI, demi, ALIYA ibarra, 3prep    ROBOT-ASSISTED LAPAROSCOPIC ABDOMINAL HYSTERECTOMY USING DA KEIRY XI N/A 08/09/2022    Procedure: XI ROBOTIC HYSTERECTOMY;  Surgeon: Yolanda Barriga MD;  Location: [facility];  Service: OB/GYN;  Laterality: N/A;  dual console requested    ROBOT-ASSISTED LAPAROSCOPIC SALPINGO-OOPHORECTOMY Bilateral 08/09/2022    Procedure: ROBOTIC SALPINGO-OOPHORECTOMY;  Surgeon: Yolanda Barriga MD;  Location: [facility];  Service: OB/GYN;  Laterality: Bilateral;    TRANSESOPHAGEAL ECHOCARDIOGRAPHY N/A 06/12/2023    Procedure: ECHOCARDIOGRAM, TRANSESOPHAGEAL;  Surgeon: Cyril Mast MD;  Location: [facility] EP LAB;  Service: Cardiology;  Laterality: N/A;    TREATMENT OF CARDIAC ARRHYTHMIA N/A 01/29/2020    Procedure: CARDIOVERSION;  Surgeon: Gabriel Hawley MD;  Location: [facility] EP LAB;  Service: Cardiology;  Laterality: N/A;  af, demi, dccv, fred, mb, 345    TREATMENT OF CARDIAC ARRHYTHMIA  01/24/2022    Procedure: Cardioversion or Defibrillation;  Surgeon: Gabriel Hawley MD;  Location: [facility] EP LAB;  Service: Cardiology;;    WOUND DEBRIDEMENT Left 08/06/2020    Procedure: DEBRIDEMENT, WOUND;  Surgeon: SEMAJ Márquez III, MD;  Location: [facility] 2ND FLR;  Service: Peripheral Vascular;  Laterality: Left;       Review of  Systems:  Constitutional: Negative for chills and fever.   Respiratory: Negative for cough and shortness of breath.    Gastrointestinal: Negative for nausea and vomiting.   Skin: Negative for rash.   Neurological: Negative for dizziness and headaches.   Psychiatric/Behavioral: Negative for depression.   MSK as in HPI       OBJECTIVE:     PHYSICAL EXAM:  LMP 03/25/2022 (Exact Date)     GEN:  NAD, well-developed, well-groomed.  NEURO: Awake, alert, and oriented. Normal attention and concentration.    PSYCH: Normal mood and affect. Behavior is normal.  HEENT: No cervical lymphadenopathy noted.  CARDIOVASCULAR: Radial pulses 2+ bilaterally. No LE edema noted.  PULMONARY: Breath sounds normal. No respiratory distress.  SKIN: Intact, no rashes.      MSK:     RUE:  Good active ROM of the wrist and fingers. AIN/PIN/Radial/Median/Ulnar Nerves assessed in isolation without deficit. Radial & Ulnar arteries palpated 2+. Capillary Refill <3s.    LUE:  Good active ROM of the wrist and fingers. AIN/PIN/Radial/Median/Ulnar Nerves assessed in isolation without deficit. Radial & Ulnar arteries palpated 2+. Capillary Refill <3s.  Tender to the touch over the Lunate    Bilateral wrist ROM is limited    RADIOGRAPHS:    Degenerative change of the wrist similar to the 2020 exam.    Comments: I have personally reviewed the imaging and I agree with the above radiologist's report.    ASSESSMENT/PLAN:     Vicky Alvarado is a right hand dominant 58 y.o. female presenting today for pain in her left wrist.         Plan:   Consent for PRC and [place] of the left wrist   I have explained the risks, benefits, and alternatives of the procedure to the patient in great detail. The patient voices understanding and all questions have been answered. The patient agrees with to proceed as planned. Consents were performed in clinic.       The patient indicates understanding of these issues and agrees to the plan.    Sarika Narayanan ATC, [facility]  Hand  Clinic [facility] [place], [place]

## 2023-08-22 NOTE — PROGRESS NOTES
Subjective:      Patient ID: Vicky Alvarado is a 58 y.o. female.    Chief Complaint: Follow-up of the Left Wrist      HPI  Vicky Alvarado is a right hand dominant 58 y.o. female presenting today for pain in her left wrist.  Diagnosed with Ke[place]bocks in the past  Onset of symptoms began in 2020. She has had a left CTR in 2021 and a right CTR in 2021.  She describes it as sharp 7/10 pain when palpated over the lunate. She has discussed a possible PRC with our Physician Assistant in the past. She is currently her today to discuss surgery for [place]bocks disease of her left wrist.  Her current xray is similar to the one she had in 2020 with no changes      Review of patient's allergies indicates:   Allergen Reactions    Flecainide Shortness Of Breath and Swelling    Shellfish containing products Other (See Comments)     Makes gout terrible    Vancomycin analogues Hives, Itching and Rash         Current Outpatient Medications   Medication Sig Dispense Refill    albuterol (VENTOLIN HFA) 90 mcg/actuation inhaler Inhale 2 puffs into the lungs every 6 (six) hours as needed for Wheezing. Rescue 18 g 0    ALPRAZolam (XANAX) 0.5 MG tablet Take 1 tablet (0.5 mg total) by mouth 2 (two) times daily as needed for Anxiety. 60 tablet 4    aspirin (ECOTRIN) 81 MG EC tablet Take 81 mg by mouth once daily.      atorvastatin (LIPITOR) 80 MG tablet Take 1 tablet (80 mg total) by mouth once daily. 90 tablet 3    b complex vitamins capsule Take 1 capsule by mouth once daily.      clopidogreL (PLAVIX) 75 mg tablet Take 1 tablet (75 mg total) by mouth once daily. 30 tablet 11    colchicine (COLCRYS) 0.6 mg tablet For gout attack: Take TWO tablets by mouth, then, 2 hours later, take ONE additional tablet. Then take 1 tablet twice daily only if still needed for gout pain. 20 tablet 4    DULoxetine (CYMBALTA) 60 MG capsule Take 2 capsules (120 mg total) by mouth once daily. 180 capsule 3    furosemide (LASIX) 40 MG tablet Take 1  tablet (40 mg total) by mouth 2 (two) times daily. Resume as needed for edema until followup with your PCP. 60 tablet 11    gluc-shikha-[facility]#[id number] 750 mg-644 mg- 30 mg-1 mg Tab Take 1 tablet by mouth once daily.       lisinopriL (PRINIVIL,ZESTRIL) 40 MG tablet Take 1 tablet (40 mg total) by mouth once daily. 90 tablet 3    melatonin 10 mg Tab Take 1-2 tablets by mouth nightly as needed (Sleep).      metoprolol succinate (TOPROL-XL) 50 MG 24 hr tablet Take 4 tablets (200 mg total) by mouth once daily. 180 tablet 3    multivitamin (THERAGRAN) per tablet Take 1 tablet by mouth once daily.      NIFEdipine (PROCARDIA-XL) 90 MG (OSM) 24 hr tablet Take 1 tablet (90 mg total) by mouth once daily. HOLD UNTIL FOLLOW-UP WITH YOUR PCP. 30 tablet 11    omeprazole (PRILOSEC) 20 MG capsule TAKE 1 CAPSULE BY MOUTH ONCE DAILY 90 capsule 3    potassium chloride SA (K-DUR,KLOR-CON) 20 MEQ tablet Take 1 tablet (20 mEq total) by mouth once daily. ONLY TAKE WITH LASIX. 30 tablet 11    propafenone (RYTHMOL) 225 MG Tab Take 1 tablet (225 mg total) by mouth every 8 (eight) hours. 90 tablet 11    SENNA 8.6 mg tablet Take 1 tablet by mouth 2 (two) times daily. (Patient taking differently: Take 1 tablet by mouth 2 (two) times daily. prn) 30 tablet 3    tirzepatide 10 mg/0.5 mL PnIj Inject 10 mg into the skin every 7 days. 4 pen 2    traZODone (DESYREL) 100 MG tablet Take 1 tablet (100 mg total) by mouth nightly as needed for Insomnia. 90 tablet 3    walker (ULTRA-LIGHT ROLLATOR) Misc As directed 1 each 0    ferrous sulfate (SLOW RELEASE IRON) 142 mg (45 mg iron) TbSR Take 1 tablet (142 mg total) by mouth once daily. FOR 7 DAY THEN INCREASE TO TWICE DAILY AS TOLERATED (Patient not taking: Reported on 8/22/2023)      ibuprofen (ADVIL,MOTRIN) 600 MG tablet Take 1 tablet (600 mg total) by mouth every 6 (six) hours as needed for Pain. (Patient not taking: Reported on 8/22/2023) 30 tablet 1    krill/om-3/dha/epa/phospho/ast (MEGARED OMEGA-3  KRILL OIL ORAL) Take 1 capsule by mouth once daily.      oxyCODONE-acetaminophen (PERCOCET) 7.5-325 mg per tablet Take 1 tablet by mouth every 8 (eight) hours as needed for Pain. (Patient not taking: Reported on 8/22/2023) 20 tablet 0    predniSONE (DELTASONE) 10 MG tablet Take 1 tablet by mouth daily (Patient not taking: Reported on 8/22/2023) 10 tablet 0     No current facility-administered medications for this visit.       Past Medical History:   Diagnosis Date    Anticoagulant long-term use     Arthritis     Atrial fibrillation     cardioversion    Atrial fibrillation with RVR     Avascular necrosis     L hand    CHF (congestive heart failure)     Depression     Diabetes mellitus     Encounter for blood transfusion 07/22/2020    Encounter for blood transfusion 03/2022    Fatty liver     GERD (gastroesophageal reflux disease)     Hx of psychiatric care     Hypertension     Iron deficiency anemia 05/07/2022    TIBC 444 with saturated iron 8    Liver disease     Obese     Pre-diabetes 05/07/2022    Psychiatric problem     Sleep apnea     wears cpap       Past Surgical History:   Procedure Laterality Date    ABLATION OF ARRHYTHMOGENIC FOCUS FOR ATRIAL FIBRILLATION N/A 07/20/2020    Procedure: Ablation atrial fibrillation;  Surgeon: Gabriel Hawley MD;  Location: [facility] EP LAB;  Service: Cardiology;  Laterality: N/A;  afib, PVI, RFA, REENA, SHADY, anes, MB, 3 Prep    ABLATION OF ARRHYTHMOGENIC FOCUS FOR ATRIAL FIBRILLATION N/A 01/24/2022    Procedure: Ablation atrial fibrillation;  Surgeon: Gabriel Hawley MD;  Location: [facility] EP LAB;  Service: Cardiology;  Laterality: N/A;  afib/afl, PVI (re-do)/CTI, RFA, REENA (cx if SR), SHADY, anes, MB, 3 Prep    APPLICATION OF WOUND VACUUM-ASSISTED CLOSURE DEVICE Left 08/03/2020    Procedure: APPLICATION, WOUND VAC;  Surgeon: SEMAJ Márquez III, MD;  Location: [facility] OR [address] [place] [address];  Service: Peripheral Vascular;  Laterality: Left;    APPLICATION OF WOUND VACUUM-ASSISTED CLOSURE DEVICE  Left 08/06/2020    Procedure: APPLICATION, WOUND VAC;  Surgeon: SEMAJ Márquez III, MD;  Location: [facility] OR 2ND FLR;  Service: Peripheral Vascular;  Laterality: Left;    CARPAL TUNNEL RELEASE Right 06/10/2020    Procedure: RELEASE, CARPAL TUNNEL - RIGHT;  Surgeon: Adelaida Hall MD;  Location: [facility] OR;  Service: Orthopedics;  Laterality: Right;  GENERAL AND REGIONAL    CARPAL TUNNEL RELEASE Left 05/05/2021    Procedure: RELEASE, CARPAL TUNNEL, LEFT;  Surgeon: Adelaida Hall MD;  Location: [facility] OR;  Service: Orthopedics;  Laterality: Left;  GENERAL & REGIONAL IN PACU    CLOSURE OF WOUND Left 08/06/2020    Procedure: CLOSURE, WOUND;  Surgeon: SEMAJ Márquez III, MD;  [address];  Service: Peripheral Vascular;  Laterality: Left;  Complex    COLONOSCOPY N/A 08/17/2021    Procedure: COLONOSCOPY Suprep;  Surgeon: Loco Deluca MD;  Location: [facility];  Service: Endoscopy;  Laterality: N/A;    ECHOCARDIOGRAM,TRANSESOPHAGEAL N/A 7/24/2023    Procedure: Transesophageal echo (REENA) intra-procedure log documentation;  [facility];  Location: [facility] EP LAB;  Service: Cardiology;  Laterality: N/A;  s/p WM, REENA, anes, 3 Prep    ESOPHAGOGASTRODUODENOSCOPY N/A 08/17/2021    Procedure: EGD (ESOPHAGOGASTRODUODENOSCOPY);  Surgeon: Loco Deluca MD;  Location: [facility];  Service: Endoscopy;  Laterality: N/A;  Patient is schedule to have her Covid test on 08/14/2021 at the [facility] @ 9:30am. [place].    EXPLORATION OF FEMORAL ARTERY Left 07/21/2020    Procedure: EXPLORATION, ARTERY, FEMORAL;  Surgeon: SEMAJ Márquez III, MD;  Location: [facility] 2ND FLR;  Service: Peripheral Vascular;  Laterality: Left;  1. Urgent Direct repair, L SFA branch laceration    FOOT SURGERY      HYSTERECTOMY      HYSTEROSCOPY WITH DILATION AND CURETTAGE OF UTERUS N/A 02/19/2022    Procedure: HYSTEROSCOPY, WITH DILATION AND CURETTAGE OF UTERUS;  Surgeon: Shane Palomo MD;  Location: [facility]  OR 2ND FLR;  Service: OB/GYN;  Laterality: N/A;    INCISION AND DRAINAGE OF KNEE Left 05/12/2022    Procedure: INCISION AND DRAINAGE, Prepatellar bursectomy.;  Surgeon: Dre Guzmán MD;  Location: [facility] 2ND FLR;  Service: Orthopedics;  Laterality: Left;    LEFT HEART CATHETERIZATION Left 02/07/2023    Procedure: Left heart cath;  Surgeon: Josiah Terry MD;  Location: [facility] CATH LAB;  Service: Cardiology;  Laterality: Left;  borderline moderate bleeding risk 6.3%    OCCLUSION OF LEFT ATRIAL APPENDAGE N/A 06/12/2023    Procedure: Left atrial appendage occlusion;  Surgeon: Gabriel Hawley MD;  Location: [facility] EP LAB;  Service: Cardiology;  Laterality: N/A;  afib, watchman, BSCI, demi, ALIYA ibarra, 3prep    ROBOT-ASSISTED LAPAROSCOPIC ABDOMINAL HYSTERECTOMY USING DA KEIRY XI N/A 08/09/2022    Procedure: XI ROBOTIC HYSTERECTOMY;  Surgeon: Yolanda Barriga MD;  Location: [facility];  Service: OB/GYN;  Laterality: N/A;  dual console requested    ROBOT-ASSISTED LAPAROSCOPIC SALPINGO-OOPHORECTOMY Bilateral 08/09/2022    Procedure: ROBOTIC SALPINGO-OOPHORECTOMY;  Surgeon: Yolanda Barriga MD;  Location: [facility];  Service: OB/GYN;  Laterality: Bilateral;    TRANSESOPHAGEAL ECHOCARDIOGRAPHY N/A 06/12/2023    Procedure: ECHOCARDIOGRAM, TRANSESOPHAGEAL;  Surgeon: Cyril Mast MD;  Location: [facility] EP LAB;  Service: Cardiology;  Laterality: N/A;    TREATMENT OF CARDIAC ARRHYTHMIA N/A 01/29/2020    Procedure: CARDIOVERSION;  Surgeon: Gabriel Hawley MD;  Location: [facility] EP LAB;  Service: Cardiology;  Laterality: N/A;  af, demi, dccv, fred, mb, 345    TREATMENT OF CARDIAC ARRHYTHMIA  01/24/2022    Procedure: Cardioversion or Defibrillation;  Surgeon: Gabriel Hawley MD;  Location: [facility] EP LAB;  Service: Cardiology;;    WOUND DEBRIDEMENT Left 08/06/2020    Procedure: DEBRIDEMENT, WOUND;  Surgeon: SEMAJ Márquez III, MD;  Location: [facility] 2ND FLR;  Service: Peripheral Vascular;  Laterality: Left;       Review of  Systems:  Constitutional: Negative for chills and fever.   Respiratory: Negative for cough and shortness of breath.    Gastrointestinal: Negative for nausea and vomiting.   Skin: Negative for rash.   Neurological: Negative for dizziness and headaches.   Psychiatric/Behavioral: Negative for depression.   MSK as in HPI       OBJECTIVE:     PHYSICAL EXAM:  LMP 03/25/2022 (Exact Date)     GEN:  NAD, well-developed, well-groomed.  NEURO: Awake, alert, and oriented. Normal attention and concentration.    PSYCH: Normal mood and affect. Behavior is normal.  HEENT: No cervical lymphadenopathy noted.  CARDIOVASCULAR: Radial pulses 2+ bilaterally. No LE edema noted.  PULMONARY: Breath sounds normal. No respiratory distress.  SKIN: Intact, no rashes.      MSK:     RUE:  Good active ROM of the wrist and fingers. AIN/PIN/Radial/Median/Ulnar Nerves assessed in isolation without deficit. Radial & Ulnar arteries palpated 2+. Capillary Refill <3s.    LUE:  Good active ROM of the wrist and fingers. AIN/PIN/Radial/Median/Ulnar Nerves assessed in isolation without deficit. Radial & Ulnar arteries palpated 2+. Capillary Refill <3s.  Tender to the touch over the Lunate    Bilateral wrist ROM is limited    RADIOGRAPHS:    Degenerative change of the wrist similar to the 2020 exam.    Comments: I have personally reviewed the imaging and I agree with the above radiologist's report.    ASSESSMENT/PLAN:     Vicky Alvarado is a right hand dominant 58 y.o. female presenting today for pain in her left wrist.         Plan:   Consent for PRC and [place] of the left wrist   I have explained the risks, benefits, and alternatives of the procedure to the patient in great detail. The patient voices understanding and all questions have been answered. The patient agrees with to proceed as planned. Consents were performed in clinic.       The patient indicates understanding of these issues and agrees to the plan.    Sarika Narayanan ATC, [facility]  Hand  Clinic [facility] [place], [place]

## 2023-08-24 ENCOUNTER — PATIENT MESSAGE (OUTPATIENT)
Dept: HEMATOLOGY/ONCOLOGY | Facility: CLINIC | Age: 58
End: 2023-08-24
Payer: COMMERCIAL

## 2023-08-25 ENCOUNTER — LAB VISIT (OUTPATIENT)
Dept: LAB | Facility: HOSPITAL | Age: 58
End: 2023-08-25
Payer: COMMERCIAL

## 2023-08-25 ENCOUNTER — OFFICE VISIT (OUTPATIENT)
Dept: HEMATOLOGY/ONCOLOGY | Facility: CLINIC | Age: 58
End: 2023-08-25
Payer: COMMERCIAL

## 2023-08-25 VITALS
OXYGEN SATURATION: 95 % | BODY MASS INDEX: 48.13 KG/M2 | WEIGHT: 245.13 LBS | HEART RATE: 72 BPM | DIASTOLIC BLOOD PRESSURE: 73 MMHG | SYSTOLIC BLOOD PRESSURE: 100 MMHG | HEIGHT: 60 IN | TEMPERATURE: 98 F

## 2023-08-25 DIAGNOSIS — D64.9 NORMOCYTIC ANEMIA: ICD-10-CM

## 2023-08-25 DIAGNOSIS — D50.0 IRON DEFICIENCY ANEMIA DUE TO CHRONIC BLOOD LOSS: Primary | ICD-10-CM

## 2023-08-25 LAB
ALBUMIN SERPL BCP-MCNC: 3.7 G/DL (ref 3.5–5.2)
ALP SERPL-CCNC: 315 U/L (ref 55–135)
ALT SERPL W/O P-5'-P-CCNC: 15 U/L (ref 10–44)
ANION GAP SERPL CALC-SCNC: 13 MMOL/L (ref 8–16)
AST SERPL-CCNC: 52 U/L (ref 10–40)
BASOPHILS # BLD AUTO: 0.06 K/UL (ref 0–0.2)
BASOPHILS NFR BLD: 1.1 % (ref 0–1.9)
BILIRUB SERPL-MCNC: 1 MG/DL (ref 0.1–1)
BUN SERPL-MCNC: 15 MG/DL (ref 6–20)
CALCIUM SERPL-MCNC: 10.3 MG/DL (ref 8.7–10.5)
CHLORIDE SERPL-SCNC: 98 MMOL/L (ref 95–110)
CO2 SERPL-SCNC: 22 MMOL/L (ref 23–29)
CREAT SERPL-MCNC: 1 MG/DL (ref 0.5–1.4)
DIFFERENTIAL METHOD: ABNORMAL
EOSINOPHIL # BLD AUTO: 0.1 K/UL (ref 0–0.5)
EOSINOPHIL NFR BLD: 2.5 % (ref 0–8)
ERYTHROCYTE [DISTWIDTH] IN BLOOD BY AUTOMATED COUNT: 14 % (ref 11.5–14.5)
EST. GFR  (NO RACE VARIABLE): >60 ML/MIN/1.73 M^2
FERRITIN SERPL-MCNC: 227 NG/ML (ref 20–300)
GLUCOSE SERPL-MCNC: 107 MG/DL (ref 70–110)
HCT VFR BLD AUTO: 44.4 % (ref 37–48.5)
HGB BLD-MCNC: 14.3 G/DL (ref 12–16)
IMM GRANULOCYTES # BLD AUTO: 0.02 K/UL (ref 0–0.04)
IMM GRANULOCYTES NFR BLD AUTO: 0.4 % (ref 0–0.5)
IRON SERPL-MCNC: 78 UG/DL (ref 30–160)
LYMPHOCYTES # BLD AUTO: 0.8 K/UL (ref 1–4.8)
LYMPHOCYTES NFR BLD: 14.4 % (ref 18–48)
MAGNESIUM SERPL-MCNC: 1.9 MG/DL (ref 1.6–2.6)
MCH RBC QN AUTO: 31 PG (ref 27–31)
MCHC RBC AUTO-ENTMCNC: 32.2 G/DL (ref 32–36)
MCV RBC AUTO: 96 FL (ref 82–98)
MONOCYTES # BLD AUTO: 0.3 K/UL (ref 0.3–1)
MONOCYTES NFR BLD: 6 % (ref 4–15)
NEUTROPHILS # BLD AUTO: 4.3 K/UL (ref 1.8–7.7)
NEUTROPHILS NFR BLD: 75.6 % (ref 38–73)
NRBC BLD-RTO: 0 /100 WBC
PHOSPHATE SERPL-MCNC: 3.7 MG/DL (ref 2.7–4.5)
PLATELET # BLD AUTO: 219 K/UL (ref 150–450)
PMV BLD AUTO: 10.8 FL (ref 9.2–12.9)
POTASSIUM SERPL-SCNC: 5.5 MMOL/L (ref 3.5–5.1)
PROT SERPL-MCNC: 8.7 G/DL (ref 6–8.4)
RBC # BLD AUTO: 4.61 M/UL (ref 4–5.4)
RETICS/RBC NFR AUTO: 2.1 % (ref 0.5–2.5)
SATURATED IRON: 20 % (ref 20–50)
SODIUM SERPL-SCNC: 133 MMOL/L (ref 136–145)
TOTAL IRON BINDING CAPACITY: 397 UG/DL (ref 250–450)
TRANSFERRIN SERPL-MCNC: 268 MG/DL (ref 200–375)
WBC # BLD AUTO: 5.69 K/UL (ref 3.9–12.7)

## 2023-08-25 PROCEDURE — 1159F PR MEDICATION LIST DOCUMENTED IN MEDICAL RECORD: ICD-10-PCS | Mod: CPTII,S$GLB,, | Performed by: INTERNAL MEDICINE

## 2023-08-25 PROCEDURE — 1160F RVW MEDS BY RX/DR IN RCRD: CPT | Mod: CPTII,S$GLB,, | Performed by: INTERNAL MEDICINE

## 2023-08-25 PROCEDURE — 99213 PR OFFICE/OUTPT VISIT, EST, LEVL III, 20-29 MIN: ICD-10-PCS | Mod: S$GLB,,, | Performed by: INTERNAL MEDICINE

## 2023-08-25 PROCEDURE — 3008F BODY MASS INDEX DOCD: CPT | Mod: CPTII,S$GLB,, | Performed by: INTERNAL MEDICINE

## 2023-08-25 PROCEDURE — 3074F SYST BP LT 130 MM HG: CPT | Mod: CPTII,S$GLB,, | Performed by: INTERNAL MEDICINE

## 2023-08-25 PROCEDURE — 99213 OFFICE O/P EST LOW 20 MIN: CPT | Mod: S$GLB,,, | Performed by: INTERNAL MEDICINE

## 2023-08-25 PROCEDURE — 99999 PR PBB SHADOW E&M-EST. PATIENT-LVL III: CPT | Mod: PBBFAC,,, | Performed by: INTERNAL MEDICINE

## 2023-08-25 PROCEDURE — 3072F PR LOW RISK FOR RETINOPATHY: ICD-10-PCS | Mod: CPTII,S$GLB,, | Performed by: INTERNAL MEDICINE

## 2023-08-25 PROCEDURE — 99999 PR PBB SHADOW E&M-EST. PATIENT-LVL III: ICD-10-PCS | Mod: PBBFAC,,, | Performed by: INTERNAL MEDICINE

## 2023-08-25 PROCEDURE — 4010F PR ACE/ARB THEARPY RXD/TAKEN: ICD-10-PCS | Mod: CPTII,S$GLB,, | Performed by: INTERNAL MEDICINE

## 2023-08-25 PROCEDURE — 3078F DIAST BP <80 MM HG: CPT | Mod: CPTII,S$GLB,, | Performed by: INTERNAL MEDICINE

## 2023-08-25 PROCEDURE — 84466 ASSAY OF TRANSFERRIN: CPT | Performed by: INTERNAL MEDICINE

## 2023-08-25 PROCEDURE — 82728 ASSAY OF FERRITIN: CPT | Performed by: INTERNAL MEDICINE

## 2023-08-25 PROCEDURE — 3008F PR BODY MASS INDEX (BMI) DOCUMENTED: ICD-10-PCS | Mod: CPTII,S$GLB,, | Performed by: INTERNAL MEDICINE

## 2023-08-25 PROCEDURE — 85025 COMPLETE CBC W/AUTO DIFF WBC: CPT | Performed by: INTERNAL MEDICINE

## 2023-08-25 PROCEDURE — 83735 ASSAY OF MAGNESIUM: CPT | Performed by: INTERNAL MEDICINE

## 2023-08-25 PROCEDURE — 85045 AUTOMATED RETICULOCYTE COUNT: CPT | Performed by: INTERNAL MEDICINE

## 2023-08-25 PROCEDURE — 4010F ACE/ARB THERAPY RXD/TAKEN: CPT | Mod: CPTII,S$GLB,, | Performed by: INTERNAL MEDICINE

## 2023-08-25 PROCEDURE — 84100 ASSAY OF PHOSPHORUS: CPT | Performed by: INTERNAL MEDICINE

## 2023-08-25 PROCEDURE — 36415 COLL VENOUS BLD VENIPUNCTURE: CPT | Performed by: INTERNAL MEDICINE

## 2023-08-25 PROCEDURE — 3072F LOW RISK FOR RETINOPATHY: CPT | Mod: CPTII,S$GLB,, | Performed by: INTERNAL MEDICINE

## 2023-08-25 PROCEDURE — 3078F PR MOST RECENT DIASTOLIC BLOOD PRESSURE < 80 MM HG: ICD-10-PCS | Mod: CPTII,S$GLB,, | Performed by: INTERNAL MEDICINE

## 2023-08-25 PROCEDURE — 80053 COMPREHEN METABOLIC PANEL: CPT | Performed by: INTERNAL MEDICINE

## 2023-08-25 PROCEDURE — 1160F PR REVIEW ALL MEDS BY PRESCRIBER/CLIN PHARMACIST DOCUMENTED: ICD-10-PCS | Mod: CPTII,S$GLB,, | Performed by: INTERNAL MEDICINE

## 2023-08-25 PROCEDURE — 1159F MED LIST DOCD IN RCRD: CPT | Mod: CPTII,S$GLB,, | Performed by: INTERNAL MEDICINE

## 2023-08-25 PROCEDURE — 3074F PR MOST RECENT SYSTOLIC BLOOD PRESSURE < 130 MM HG: ICD-10-PCS | Mod: CPTII,S$GLB,, | Performed by: INTERNAL MEDICINE

## 2023-08-25 NOTE — PROGRESS NOTES
Section of Hematology and Stem Cell Transplantation  Follow Up Visit     Referred by:  No ref. provider found  Date of visit: 8/25/23    Reason for visit: Iron deficiency anemia due to chronic blood loss [D50.0]    History of Present Ilness:   Vicky Alvarado (Vicky) is a pleasant 58 y.o.female with a past medical history as listed below who presents for initial consult for further workup of anemia. She was noted to have anemia in 2020. She previously donated blood and platelets regularly. In 7/2020 she had a femoral artery laceration following RFA resulting in significant blood loss. She had this repaired by vascular surgery. Then in 2/2022 she had menorrhagia from a uterine polyp. This was resolved with D&C and later a hysterectomy. Since then she has had significant fatigue and decreased exercise tolerance due to dyspnea.  Denies recent fevers, chills, night sweats, weight loss, lymphadenopathy, bone pain, new medications changes.     Interval history:  Ms. Alvarado returns for follow-up.  She received iron infusions (Injectafer 1500mg) in April 2023.  She states that overall she feels much better since receiving iron infusions.  No recent blood loss.    PAST MEDICAL HISTORY:   Past Medical History:   Diagnosis Date    Anticoagulant long-term use     Arthritis     Atrial fibrillation     cardioversion    Atrial fibrillation with RVR     Avascular necrosis     L hand    CHF (congestive heart failure)     Depression     Diabetes mellitus     Encounter for blood transfusion 07/22/2020    Encounter for blood transfusion 03/2022    Fatty liver     GERD (gastroesophageal reflux disease)     Hx of psychiatric care     Hypertension     Iron deficiency anemia 05/07/2022    TIBC 444 with saturated iron 8    Liver disease     Obese     Pre-diabetes 05/07/2022    Psychiatric problem     Sleep apnea     wears cpap       PAST SURGICAL HISTORY:   Past Surgical History:   Procedure Laterality Date    ABLATION OF  ARRHYTHMOGENIC FOCUS FOR ATRIAL FIBRILLATION N/A 07/20/2020    Procedure: Ablation atrial fibrillation;  Surgeon: Gabriel Hawley MD;  Location: [facility] EP LAB;  Service: Cardiology;  Laterality: N/A;  afib, PVI, RFA, REENA, SHADY, anes, MB, 3 Prep    ABLATION OF ARRHYTHMOGENIC FOCUS FOR ATRIAL FIBRILLATION N/A 01/24/2022    Procedure: Ablation atrial fibrillation;  Surgeon: Gabriel Hawley MD;  Location: [facility] EP LAB;  Service: Cardiology;  Laterality: N/A;  afib/afl, PVI (re-do)/CTI, RFA, REENA (cx if SR), SHADY, anes, MB, 3 Prep    APPLICATION OF WOUND VACUUM-ASSISTED CLOSURE DEVICE Left 08/03/2020    Procedure: APPLICATION, WOUND VAC;  Surgeon: SEMAJ Márquez III, MD;  Location: [facility] OR 2ND FLR;  Service: Peripheral Vascular;  Laterality: Left;    APPLICATION OF WOUND VACUUM-ASSISTED CLOSURE DEVICE Left 08/06/2020    Procedure: APPLICATION, WOUND VAC;  Surgeon: SEMAJ Márquez III, MD;  Location: [facility] OR 2ND FLR;  Service: Peripheral Vascular;  Laterality: Left;    CARPAL TUNNEL RELEASE Right 06/10/2020    Procedure: RELEASE, CARPAL TUNNEL - RIGHT;  Surgeon: Adelaida Hall MD;  Location: [facility];  Service: Orthopedics;  Laterality: Right;  GENERAL AND REGIONAL    CARPAL TUNNEL RELEASE Left 05/05/2021    Procedure: RELEASE, CARPAL TUNNEL, LEFT;  Surgeon: Adelaida Hall MD;  Location: [facility] OR;  Service: Orthopedics;  Laterality: Left;  GENERAL & REGIONAL IN PACU    CLOSURE OF WOUND Left 08/06/2020    Procedure: CLOSURE, WOUND;  Surgeon: SEMAJ Márquez III, MD;  Location: [facility] OR 2ND FLR;  Service: Peripheral Vascular;  Laterality: Left;  Complex    COLONOSCOPY N/A 08/17/2021    Procedure: COLONOSCOPY Suprep;  Surgeon: Loco Deluca MD;  Location: [facility] ENDO;  Service: Endoscopy;  Laterality: N/A;    ECHOCARDIOGRAM,TRANSESOPHAGEAL N/A 7/24/2023    Procedure: Transesophageal echo (REENA) intra-procedure log documentation;  Surgeon: [facility] Diagnostic Provider;  Location: [facility] EP LAB;  Service:  Cardiology;  Laterality: N/A;  s/p WM, DEMI, anes, 3 Prep    ESOPHAGOGASTRODUODENOSCOPY N/A 08/17/2021    Procedure: EGD (ESOPHAGOGASTRODUODENOSCOPY);  Surgeon: Loco Deluca MD;  Location: [facility];  Service: Endoscopy;  Laterality: N/A;  Patient is schedule to have her Covid test on 08/14/2021 at the [facility] @ 9:30am. [place].    EXPLORATION OF FEMORAL ARTERY Left 07/21/2020    Procedure: EXPLORATION, ARTERY, FEMORAL;  Surgeon: SEMAJ Márquez III, MD;  Location: [address];  Service: Peripheral Vascular;  Laterality: Left;  1. Urgent Direct repair, L SFA branch laceration    FOOT SURGERY      HYSTERECTOMY      HYSTEROSCOPY WITH DILATION AND CURETTAGE OF UTERUS N/A 02/19/2022    Procedure: HYSTEROSCOPY, WITH DILATION AND CURETTAGE OF UTERUS;  Surgeon: Shane Palomo MD;  Location: [address];  Service: OB/GYN;  Laterality: N/A;    INCISION AND DRAINAGE OF KNEE Left 05/12/2022    Procedure: INCISION AND DRAINAGE, Prepatellar bursectomy.;  Surgeon: Dre Guzmán MD;  Location: [address] [place] [address];  Service: Orthopedics;  Laterality: Left;    LEFT HEART CATHETERIZATION Left 02/07/2023    Procedure: Left heart cath;  Surgeon: Josiah Terry MD;  Location: [facility] CATH LAB;  Service: Cardiology;  Laterality: Left;  borderline moderate bleeding risk 6.3%    OCCLUSION OF LEFT ATRIAL APPENDAGE N/A 06/12/2023    Procedure: Left atrial appendage occlusion;  Surgeon: Gabriel Hawley MD;  Location: [facility] EP LAB;  Service: Cardiology;  Laterality: N/A;  afib, watchman, BSCI, demi, anes, MB, 3prep    ROBOT-ASSISTED LAPAROSCOPIC ABDOMINAL HYSTERECTOMY USING DA KEIRY XI N/A 08/09/2022    Procedure: XI ROBOTIC HYSTERECTOMY;  Surgeon: Yolanda Barriga MD;  Location: [facility];  Service: OB/GYN;  Laterality: N/A;  dual console requested    ROBOT-ASSISTED LAPAROSCOPIC SALPINGO-OOPHORECTOMY Bilateral 08/09/2022    Procedure: ROBOTIC SALPINGO-OOPHORECTOMY;  Surgeon: Yolanda Barriga MD;  Location: [facility] OR;   Service: OB/GYN;  Laterality: Bilateral;    TRANSESOPHAGEAL ECHOCARDIOGRAPHY N/A 2023    Procedure: ECHOCARDIOGRAM, TRANSESOPHAGEAL;  Surgeon: Cyril Mast MD;  Location: [facility] EP LAB;  Service: Cardiology;  Laterality: N/A;    TREATMENT OF CARDIAC ARRHYTHMIA N/A 2020    Procedure: CARDIOVERSION;  Surgeon: Gabriel Hawley MD;  Location: [facility] EP LAB;  Service: Cardiology;  Laterality: N/A;  af, demi, dccv, anes, mb, 345    TREATMENT OF CARDIAC ARRHYTHMIA  2022    Procedure: Cardioversion or Defibrillation;  Surgeon: Gabriel Hawley MD;  Location: [facility] EP LAB;  Service: Cardiology;;    WOUND DEBRIDEMENT Left 2020    Procedure: DEBRIDEMENT, WOUND;  Surgeon: SEMAJ Márquez III, MD;  Location: [facility] OR [address] [place] [address];  Service: Peripheral Vascular;  Laterality: Left;       PAST SOCIAL HISTORY:  Social History     Tobacco Use    Smoking status: Former     Current packs/day: 0.00     Types: Cigarettes     Quit date: 2019     Years since quittin.1    Smokeless tobacco: Never   Substance Use Topics    Alcohol use: No    Drug use: No       FAMILY HISTORY:  Family History   Problem Relation Age of Onset    Cataracts Mother     Hypertension Mother     Thyroid disease Mother     Diabetes Father     Hypertension Father     Hypertension Sister     Hypertension Brother     Amblyopia Neg Hx     Blindness Neg Hx     Cancer Neg Hx     Glaucoma Neg Hx     Macular degeneration Neg Hx     Retinal detachment Neg Hx     Strabismus Neg Hx     Stroke Neg Hx     Breast cancer Neg Hx     Colon cancer Neg Hx     Ovarian cancer Neg Hx        CURRENT MEDICATIONS:   Current Outpatient Medications   Medication Sig    albuterol (VENTOLIN HFA) 90 mcg/actuation inhaler Inhale 2 puffs into the lungs every 6 (six) hours as needed for Wheezing. Rescue    ALPRAZolam (XANAX) 0.5 MG tablet Take 1 tablet (0.5 mg total) by mouth 2 (two) times daily as needed for Anxiety.    aspirin (ECOTRIN) 81 MG EC tablet Take 81 mg by  mouth once daily.    atorvastatin (LIPITOR) 80 MG tablet Take 1 tablet (80 mg total) by mouth once daily.    b complex vitamins capsule Take 1 capsule by mouth once daily.    clopidogreL (PLAVIX) 75 mg tablet Take 1 tablet (75 mg total) by mouth once daily.    colchicine (COLCRYS) 0.6 mg tablet For gout attack: Take TWO tablets by mouth, then, 2 hours later, take ONE additional tablet. Then take 1 tablet twice daily only if still needed for gout pain.    DULoxetine (CYMBALTA) 60 MG capsule Take 2 capsules (120 mg total) by mouth once daily.    furosemide (LASIX) 40 MG tablet Take 1 tablet (40 mg total) by mouth 2 (two) times daily. Resume as needed for edema until followup with your PCP.    gluc[facility] [place]#5-E-urdo-reymundo-bor 750 mg-644 mg- 30 mg-1 mg Tab Take 1 tablet by mouth once daily.     krill/om-3/dha/epa/phospho/ast (MEGARED OMEGA-3 KRILL OIL ORAL) Take 1 capsule by mouth once daily.    lisinopriL (PRINIVIL,ZESTRIL) 40 MG tablet Take 1 tablet (40 mg total) by mouth once daily.    melatonin 10 mg Tab Take 1-2 tablets by mouth nightly as needed (Sleep).    metoprolol succinate (TOPROL-XL) 50 MG 24 hr tablet Take 4 tablets (200 mg total) by mouth once daily.    multivitamin (THERAGRAN) per tablet Take 1 tablet by mouth once daily.    NIFEdipine (PROCARDIA-XL) 90 MG (OSM) 24 hr tablet Take 1 tablet (90 mg total) by mouth once daily. HOLD UNTIL FOLLOW-UP WITH YOUR PCP.    omeprazole (PRILOSEC) 20 MG capsule TAKE 1 CAPSULE BY MOUTH ONCE DAILY    potassium chloride SA (K-DUR,KLOR-CON) 20 MEQ tablet Take 1 tablet (20 mEq total) by mouth once daily. ONLY TAKE WITH LASIX.    propafenone (RYTHMOL) 225 MG Tab Take 1 tablet (225 mg total) by mouth every 8 (eight) hours.    SENNA 8.6 mg tablet Take 1 tablet by mouth 2 (two) times daily. (Patient taking differently: Take 1 tablet by mouth 2 (two) times daily. prn)    tirzepatide 10 mg/0.5 mL PnIj Inject 10 mg into the skin every 7 days.    traZODone (DESYREL) 100 MG tablet Take  1 tablet (100 mg total) by mouth nightly as needed for Insomnia.    walker (ULTRA-LIGHT ROLLATOR) Misc As directed    ferrous sulfate (SLOW RELEASE IRON) 142 mg (45 mg iron) TbSR Take 1 tablet (142 mg total) by mouth once daily. FOR 7 DAY THEN INCREASE TO TWICE DAILY AS TOLERATED (Patient not taking: Reported on 8/22/2023)    ibuprofen (ADVIL,MOTRIN) 600 MG tablet Take 1 tablet (600 mg total) by mouth every 6 (six) hours as needed for Pain. (Patient not taking: Reported on 8/22/2023)    oxyCODONE-acetaminophen (PERCOCET) 7.5-325 mg per tablet Take 1 tablet by mouth every 8 (eight) hours as needed for Pain. (Patient not taking: Reported on 8/22/2023)    predniSONE (DELTASONE) 10 MG tablet Take 1 tablet by mouth daily (Patient not taking: Reported on 8/22/2023)     No current facility-administered medications for this visit.       ALLERGIES:   Review of patient's allergies indicates:   Allergen Reactions    Flecainide Shortness Of Breath and Swelling    Shellfish containing products Other (See Comments)     Makes gout terrible    Vancomycin analogues Hives, Itching and Rash       Review of Systems:     Pertinent positives and negatives included in the HPI. Otherwise a complete review of systems is negative.    Physical Exam:     Vitals:    08/25/23 1515   BP: 100/73   Pulse: 72   Temp: 98.3 °F (36.8 °C)       General: Appears well, NAD  Pulmonary: CTAB, no increased work of breathing, no W/R/C  Cardiovascular: S1S2 normal, RRR, no M/R/G  Abdominal: Soft, NT, ND, BS+, no HSM  Extremities: No C/C/E  Dermatologic: No appreciable rashes or lesions    Labs:   Lab Results   Component Value Date    WBC 5.69 08/25/2023    HGB 14.3 08/25/2023    HCT 44.4 08/25/2023    MCV 96 08/25/2023     08/25/2023       Lab Results   Component Value Date     (L) 08/25/2023    K 5.5 (H) 08/25/2023    CL 98 08/25/2023    CO2 22 (L) 08/25/2023    BUN 15 08/25/2023    CREATININE 1.0 08/25/2023    ALBUMIN 3.7 08/25/2023    BILITOT  "1.0 08/25/2023    ALKPHOS 315 (H) 08/25/2023    AST 52 (H) 08/25/2023    ALT 15 08/25/2023       Lab Results   Component Value Date    IRON 78 08/25/2023    TIBC 397 08/25/2023    FERRITIN 227 08/25/2023       No components found for: "RETICULOCYTES"    Lab Results   Component Value Date    HAPTOGLOBIN 196 03/31/2023     03/31/2023        Assessment and Plan:   Vicky Wiggins) is a pleasant 58 y.o.female who presents for follow-up.    Iron deficiency anemia:  She has a history of iron-deficiency anemia related to blood loss as noted above.  Iron stores as of March 2023 were low.  She received Injectafer 1500mg in April 2023.  She feels better overall since receiving this.  Her iron stores at this time are back to normal.  No further need for additional iron at this time.  She was encouraged to call/message if she develops new/worsening symptoms and/or recurrent anemia.    Follow up: PRN    Orders/Follow Up:           Route Chart for Scheduling    BMT Chart Routing      Follow up with physician No follow up needed.   Follow up with NATALI    Provider visit type    Infusion scheduling note    Injection scheduling note    Labs    Imaging    Pharmacy appointment    Other referrals                Therapy Plan Information  EPINEPHrine (EPIPEN) 0.3 mg/0.3 mL pen injection 0.3 mg  0.3 mg, Intramuscular, PRN  diphenhydrAMINE injection 50 mg  50 mg, Intravenous, PRN  methylPREDNISolone sodium succinate injection 125 mg  125 mg, Intravenous, PRN  sodium chloride 0.9% bolus 1,000 mL 1,000 mL  1,000 mL, Intravenous, PRN      Total time of this visit was 25 minutes, including time spent face to face with patient and/or via video/audio, and also in preparing for today's visit for MDM and documentation. (Medical Decision Making, including consideration of possible diagnoses, management options, complex medical record review, review of diagnostic tests and information, consideration and discussion of significant " complications based on comorbidities, and discussion with providers involved with the care of the patient). Greater than 50% was spent face to face with the patient counseling and coordinating care.      Socrates Wilhelm MD  Hematology, Oncology, and Stem Cell Transplantation  [facility]

## 2023-08-29 ENCOUNTER — ANESTHESIA EVENT (OUTPATIENT)
Dept: SURGERY | Facility: OTHER | Age: 58
End: 2023-08-29
Payer: COMMERCIAL

## 2023-08-29 ENCOUNTER — HOSPITAL ENCOUNTER (OUTPATIENT)
Dept: PREADMISSION TESTING | Facility: OTHER | Age: 58
Discharge: HOME | End: 2023-08-29
Attending: ORTHOPAEDIC SURGERY | Admitting: ORTHOPAEDIC SURGERY
Payer: COMMERCIAL

## 2023-08-29 VITALS
HEIGHT: 63 IN | RESPIRATION RATE: 18 BRPM | WEIGHT: 245 LBS | SYSTOLIC BLOOD PRESSURE: 106 MMHG | BODY MASS INDEX: 43.41 KG/M2 | OXYGEN SATURATION: 100 % | TEMPERATURE: 97 F | DIASTOLIC BLOOD PRESSURE: 65 MMHG | HEART RATE: 70 BPM

## 2023-08-29 DIAGNOSIS — Z01.818 PREOP TESTING: Primary | ICD-10-CM

## 2023-08-29 DIAGNOSIS — I48.91 ATRIAL FIBRILLATION, UNSPECIFIED TYPE: ICD-10-CM

## 2023-08-29 DIAGNOSIS — I10 ESSENTIAL HYPERTENSION: Chronic | ICD-10-CM

## 2023-08-29 LAB
ANION GAP SERPL CALC-SCNC: 14 MMOL/L (ref 8–16)
BUN SERPL-MCNC: 29 MG/DL (ref 6–20)
CALCIUM SERPL-MCNC: 10.1 MG/DL (ref 8.7–10.5)
CHLORIDE SERPL-SCNC: 102 MMOL/L (ref 95–110)
CO2 SERPL-SCNC: 22 MMOL/L (ref 23–29)
CREAT SERPL-MCNC: 1.3 MG/DL (ref 0.5–1.4)
EST. GFR  (NO RACE VARIABLE): 47 ML/MIN/1.73 M^2
GLUCOSE SERPL-MCNC: 101 MG/DL (ref 70–110)
POTASSIUM SERPL-SCNC: 4.4 MMOL/L (ref 3.5–5.1)
SODIUM SERPL-SCNC: 138 MMOL/L (ref 136–145)

## 2023-08-29 PROCEDURE — 36415 COLL VENOUS BLD VENIPUNCTURE: CPT | Performed by: ANESTHESIOLOGY

## 2023-08-29 PROCEDURE — 80048 BASIC METABOLIC PNL TOTAL CA: CPT | Performed by: ANESTHESIOLOGY

## 2023-08-29 RX ORDER — SODIUM CHLORIDE, SODIUM LACTATE, POTASSIUM CHLORIDE, CALCIUM CHLORIDE 600; 310; 30; 20 MG/100ML; MG/100ML; MG/100ML; MG/100ML
INJECTION, SOLUTION INTRAVENOUS CONTINUOUS
Status: CANCELLED | OUTPATIENT
Start: 2023-08-29

## 2023-08-29 RX ORDER — POTASSIUM CHLORIDE 20 MEQ/1
20 TABLET, EXTENDED RELEASE ORAL DAILY
Qty: 30 TABLET | Refills: 11 | Status: CANCELLED | OUTPATIENT
Start: 2023-08-29

## 2023-08-29 RX ORDER — METOPROLOL SUCCINATE 50 MG/1
200 TABLET, EXTENDED RELEASE ORAL DAILY
Qty: 180 TABLET | Refills: 3 | Status: SHIPPED | OUTPATIENT
Start: 2023-08-29 | End: 2024-01-17

## 2023-08-29 RX ORDER — LIDOCAINE HYDROCHLORIDE 10 MG/ML
0.5 INJECTION, SOLUTION EPIDURAL; INFILTRATION; INTRACAUDAL; PERINEURAL ONCE
Status: CANCELLED | OUTPATIENT
Start: 2023-08-29 | End: 2023-08-29

## 2023-08-29 NOTE — ANESTHESIA PREPROCEDURE EVALUATION
08/29/2023  Vicky Alvarado is a 58 y.o., female.      Pre-op Assessment    I have reviewed the Patient Summary Reports.     I have reviewed the Nursing Notes. I have reviewed the NPO Status.   I have reviewed the Medications.   Steroids Taken In Past Year: Prednisone    Review of Systems  Anesthesia Hx:  Long robotic OLIVIRE required post op ventilation in ICU for severe airway swelling dur to length of surg in Trendelenberg History of prior surgery of interest to airway management or planning: Previous anesthesia: General 2022 Robotic OLIVIER V Nithya Robles with general anesthesia.  Airway issues documented on chart review include videolaryngoscope used  Denies Family Hx of Anesthesia complications.  Personal Hx of Anesthesia complications Pulmonary/Ventilatory Issues, failed extubation, prolonged mechanical ventilation (> 2 hours)   Social:  Former Smoker, No Alcohol Use    Hematology/Oncology:         -- Anemia:   Cardiovascular:   Exercise tolerance: poor Hypertension CAD  Dysrhythmias atrial fibrillation CHF hyperlipidemia ECG has been reviewed. Has implanted watchman  Recent Echo was WNL  Prev ablation for a fib times 2  Now in and out of a fib   Pulmonary:   Sleep Apnea    Renal/:  Renal/ Normal     Hepatic/GI:   GERD Liver Disease, Fatty liver   Musculoskeletal:   Arthritis     Neurological:  Neurology Normal    Endocrine:   Diabetes  Morbid Obesity / BMI > 40  Psych:   Psychiatric History anxiety depression          Physical Exam  General: Cooperative, Alert and Oriented    Airway:  Mallampati: II   Mouth Opening: Normal  Neck ROM: Normal ROM    Dental:  Dentures  Upper plate      Anesthesia Plan  Type of Anesthesia, risks & benefits discussed:    Anesthesia Type: Regional  Intra-op Monitoring Plan: Standard ASA Monitors  Post Op Pain Control Plan: multimodal analgesia and peripheral nerve  block  Informed Consent: Informed consent signed with the Patient and all parties understand the risks and agree with anesthesia plan.  All questions answered.   ASA Score: 3  Anesthesia Plan Notes: Last dose of Tirzepatide Aug 9!  Plan  regional w sedation for case  Will be off Plavix for 5 days    Ready For Surgery From Anesthesia Perspective.     .

## 2023-08-29 NOTE — DISCHARGE INSTRUCTIONS
Information to Prepare you for your Surgery    PRE-ADMIT TESTING -  [phone]    [address]  [place] [facility]          Your surgery has been scheduled at [facility]. We are pleased to have the opportunity to serve you. For Further Information please call [phone].    On the day of surgery please report to the Information Desk on the 1st floor.    CONTACT YOUR PHYSICIAN'S OFFICE THE DAY PRIOR TO YOUR SURGERY TO OBTAIN YOUR ARRIVAL TIME.     The evening before surgery do not eat anything after 9 p.m. ( this includes hard candy, chewing gum and mints).  You may only have WATER  from 9 p.m. until you leave your home.   DO NOT DRINK ANY LIQUIDS ON THE WAY TO THE HOSPITAL.      If you are a diabetic-drink only water prior to surgery.    Outpatient Surgery- May allow 2 adult Support Persons (1 being the designated ) for all surgical/procedural patients. A breastfeeding mother will be allowed her infant (6 months and under) and 2 adult Support Persons.       SPECIAL MEDICATION INSTRUCTIONS: TAKE medications checked off by the Anesthesiologist on your Medication List.    Angiogram Patients: Take medications as instructed by your physician, including aspirin.     Surgery Patients:    If you take ASPIRIN - Your PHYSICIAN/SURGEON will need to inform you IF/OR when you need to stop taking aspirin prior to your surgery.     The week prior to surgery do not ot take any medications containing IBUPROFEN or NSAIDS ( Advil, Motrin, Goodys, BC, Aleve, Naproxen etc) If you are not sure if you should take a medicine please call your surgeon's office.  Ok to take Tylenol    Do Not Wear any make-up (especially eye make-up) to surgery. Please remove any false eyelashes or eyelash extensions. If you arrive the day of surgery with makeup/eyelashes on you will be required to remove prior to surgery. (There is a risk of corneal abrasions if eye makeup/eyelash extensions are not  removed)      Leave all valuables at home.   Do Not wear any jewelry or watches, including any metal in body piercings. Jewelry must be removed prior to coming to the hospital.  There is a possibility that rings that are unable to be removed may be cut off if they are on the surgical extremity.    Please remove all hair extensions, wigs, clips and any other metal accessories/ ornaments from your hair.  These items may pose a flammable/fire risk in Surgery and must be removed.    Do not shave your surgical area at least 5 days prior to your surgery. The surgical prep will be performed at the hospital according to Infection Control regulations.    Contact Lens must be removed before surgery. Either do not wear the contact lens or bring a case and solution for storage.  Please bring a container for eyeglasses or dentures as required.  Bring any paperwork your physician has provided, such as consent forms,  history and physicals, doctor's orders, etc.   Bring comfortable clothes that are loose fitting to wear upon discharge. Take into consideration the type of surgery being performed.  Maintain your diet as advised per your physician the day prior to surgery.      Adequate rest the night before surgery is advised.   Park in the Parking lot behind the hospital or in the [facility] across the street from the parking lot. Parking is complimentary.  If you will be discharged the same day as your procedure, please arrange for a responsible adult to drive you home or to accompany you if traveling by taxi.   YOU WILL NOT BE PERMITTED TO DRIVE OR TO LEAVE THE HOSPITAL ALONE AFTER SURGERY.   If you are being discharged the same day, it is strongly recommended that you arrange for someone to remain with you for the first 24 hrs following your surgery.    The Surgeon will speak to your family/visitor after your surgery regarding the outcome of your surgery and post op care.  The Surgeon may speak to you after your  surgery, but there is a possibility you may not remember the details.  Please check with your family members regarding the conversation with the Surgeon.    We strongly recommend whoever is bringing you home be present for discharge instructions.  This will ensure a thorough understanding for your post op home care.          Thank you for your cooperation.  The Staff of [facility].            Bathing Instructions with Hibiclens    Shower the evening before and morning of your procedure with Chlorhexidine (Hibiclens)  do not use Chlorhexidine on your face or genitals. Do not get in your eyes.  Wash your face with water and your regular face wash/soap  Use your regular shampoo  Apply Chlorhexidine (Hibiclens) directly on your skin or on a wet washcloth and wash gently. When showering: Move away from the shower stream when applying Chlorhexidine (Hibiclens) to avoid rinsing off too soon.  Rinse thoroughly with warm water  Do not dilute Chlorhexidine (Hibiclens)   Dry off as usual, do not use any deodorant, powder, body lotions, perfume, after shave or cologne.

## 2023-09-05 ENCOUNTER — TELEPHONE (OUTPATIENT)
Dept: ORTHOPEDICS | Facility: CLINIC | Age: 58
End: 2023-09-05
Payer: COMMERCIAL

## 2023-09-05 DIAGNOSIS — M92.212: Primary | ICD-10-CM

## 2023-09-05 RX ORDER — OXYCODONE AND ACETAMINOPHEN 5; 325 MG/1; MG/1
1 TABLET ORAL
Qty: 10 TABLET | Refills: 0 | Status: SHIPPED | OUTPATIENT
Start: 2023-09-05 | End: 2023-09-08 | Stop reason: SDUPTHER

## 2023-09-05 RX ORDER — ACETAMINOPHEN 500 MG
1000 TABLET ORAL 2 TIMES DAILY PRN
Qty: 50 TABLET | Refills: 0 | Status: SHIPPED | OUTPATIENT
Start: 2023-09-05

## 2023-09-05 RX ORDER — IBUPROFEN 600 MG/1
600 TABLET ORAL 3 TIMES DAILY PRN
Qty: 45 TABLET | Refills: 0 | Status: SHIPPED | OUTPATIENT
Start: 2023-09-05

## 2023-09-05 NOTE — TELEPHONE ENCOUNTER
LVM to Inform pt of 8:00 a.m. arrival time for 09/06//23  surgery at the [facility]. Reminded pt of NPO status. Requested a call back to the [facility] at [phone] with any questions and to confirm.   My O message sent

## 2023-09-05 NOTE — TELEPHONE ENCOUNTER
Spoke c pt. Informed pt of 7:00 arrival time for 09/06/23 surgery at the [facility]. Reminded pt of NPO status. Pt expressed understanding & was thankful.

## 2023-09-06 ENCOUNTER — HOSPITAL ENCOUNTER (OUTPATIENT)
Facility: OTHER | Age: 58
Discharge: HOME | End: 2023-09-06
Attending: ORTHOPAEDIC SURGERY | Admitting: ORTHOPAEDIC SURGERY
Payer: COMMERCIAL

## 2023-09-06 ENCOUNTER — ANESTHESIA (OUTPATIENT)
Dept: SURGERY | Facility: OTHER | Age: 58
End: 2023-09-06
Payer: COMMERCIAL

## 2023-09-06 DIAGNOSIS — M93.1 KIENBOCK'S DISEASE OF LUNATE BONE OF LEFT WRIST IN ADULT: Primary | ICD-10-CM

## 2023-09-06 DIAGNOSIS — I10 ESSENTIAL HYPERTENSION: Chronic | ICD-10-CM

## 2023-09-06 LAB — POCT GLUCOSE: 106 MG/DL (ref 70–110)

## 2023-09-06 PROCEDURE — 63600175 PHARM REV CODE 636 W HCPCS: Performed by: ANESTHESIOLOGY

## 2023-09-06 PROCEDURE — 25000003 PHARM REV CODE 250

## 2023-09-06 PROCEDURE — D9220A PRA ANESTHESIA: Mod: ,,, | Performed by: NURSE ANESTHETIST, CERTIFIED REGISTERED

## 2023-09-06 PROCEDURE — 37000009 HC ANESTHESIA EA ADD 15 MINS: Performed by: ORTHOPAEDIC SURGERY

## 2023-09-06 PROCEDURE — 25215 REMOVAL OF WRIST BONES: CPT | Mod: LT,,, | Performed by: ORTHOPAEDIC SURGERY

## 2023-09-06 PROCEDURE — 25215 PR REMOVAL OF PROX ROW CARPAL BONES: ICD-10-PCS | Mod: LT,,, | Performed by: ORTHOPAEDIC SURGERY

## 2023-09-06 PROCEDURE — 64772 PR TRANSECT OTHR SPINAL N,XTRADURAL: ICD-10-PCS | Mod: 51,LT,, | Performed by: ORTHOPAEDIC SURGERY

## 2023-09-06 PROCEDURE — 63600175 PHARM REV CODE 636 W HCPCS

## 2023-09-06 PROCEDURE — 71000015 HC POSTOP RECOV 1ST HR: Performed by: ORTHOPAEDIC SURGERY

## 2023-09-06 PROCEDURE — 71000016 HC POSTOP RECOV ADDL HR: Performed by: ORTHOPAEDIC SURGERY

## 2023-09-06 PROCEDURE — D9220A PRA ANESTHESIA: ICD-10-PCS | Mod: ,,, | Performed by: NURSE ANESTHETIST, CERTIFIED REGISTERED

## 2023-09-06 PROCEDURE — 37000008 HC ANESTHESIA 1ST 15 MINUTES: Performed by: ORTHOPAEDIC SURGERY

## 2023-09-06 PROCEDURE — 36000709 HC OR TIME LEV III EA ADD 15 MIN: Performed by: ORTHOPAEDIC SURGERY

## 2023-09-06 PROCEDURE — 64772 INCISION OF SPINAL NERVE: CPT | Mod: 51,LT,, | Performed by: ORTHOPAEDIC SURGERY

## 2023-09-06 PROCEDURE — 82962 GLUCOSE BLOOD TEST: CPT | Performed by: ORTHOPAEDIC SURGERY

## 2023-09-06 PROCEDURE — 36000708 HC OR TIME LEV III 1ST 15 MIN: Performed by: ORTHOPAEDIC SURGERY

## 2023-09-06 PROCEDURE — 76942 ECHO GUIDE FOR BIOPSY: CPT | Performed by: ANESTHESIOLOGY

## 2023-09-06 DEVICE — IMPLANTABLE DEVICE: Type: IMPLANTABLE DEVICE | Site: WRIST | Status: FUNCTIONAL

## 2023-09-06 RX ORDER — HYDROMORPHONE HYDROCHLORIDE 2 MG/ML
0.4 INJECTION, SOLUTION INTRAMUSCULAR; INTRAVENOUS; SUBCUTANEOUS EVERY 5 MIN PRN
Status: CANCELLED | OUTPATIENT
Start: 2023-09-06

## 2023-09-06 RX ORDER — FENTANYL CITRATE 50 UG/ML
INJECTION, SOLUTION INTRAMUSCULAR; INTRAVENOUS
Status: DISCONTINUED | OUTPATIENT
Start: 2023-09-06 | End: 2023-09-06

## 2023-09-06 RX ORDER — POTASSIUM CHLORIDE 20 MEQ/1
20 TABLET, EXTENDED RELEASE ORAL DAILY
Qty: 30 TABLET | Refills: 11 | Status: CANCELLED | OUTPATIENT
Start: 2023-08-29

## 2023-09-06 RX ORDER — OXYCODONE HYDROCHLORIDE 5 MG/1
5 TABLET ORAL
Status: CANCELLED | OUTPATIENT
Start: 2023-09-06

## 2023-09-06 RX ORDER — MIDAZOLAM HYDROCHLORIDE 1 MG/ML
INJECTION, SOLUTION INTRAMUSCULAR; INTRAVENOUS
Status: DISCONTINUED | OUTPATIENT
Start: 2023-09-06 | End: 2023-09-06

## 2023-09-06 RX ORDER — LIDOCAINE HYDROCHLORIDE 10 MG/ML
0.5 INJECTION, SOLUTION EPIDURAL; INFILTRATION; INTRACAUDAL; PERINEURAL ONCE
Status: DISCONTINUED | OUTPATIENT
Start: 2023-09-06 | End: 2023-09-06 | Stop reason: HOSPADM

## 2023-09-06 RX ORDER — SODIUM CHLORIDE 0.9 % (FLUSH) 0.9 %
5 SYRINGE (ML) INJECTION
Status: DISCONTINUED | OUTPATIENT
Start: 2023-09-06 | End: 2023-09-06 | Stop reason: HOSPADM

## 2023-09-06 RX ORDER — ONDANSETRON 2 MG/ML
4 INJECTION INTRAMUSCULAR; INTRAVENOUS EVERY 12 HOURS PRN
Status: DISCONTINUED | OUTPATIENT
Start: 2023-09-06 | End: 2023-09-06 | Stop reason: HOSPADM

## 2023-09-06 RX ORDER — ROPIVACAINE HYDROCHLORIDE 5 MG/ML
INJECTION, SOLUTION EPIDURAL; INFILTRATION; PERINEURAL
Status: COMPLETED | OUTPATIENT
Start: 2023-09-06 | End: 2023-09-06

## 2023-09-06 RX ORDER — SODIUM CHLORIDE, SODIUM LACTATE, POTASSIUM CHLORIDE, CALCIUM CHLORIDE 600; 310; 30; 20 MG/100ML; MG/100ML; MG/100ML; MG/100ML
INJECTION, SOLUTION INTRAVENOUS CONTINUOUS
Status: DISCONTINUED | OUTPATIENT
Start: 2023-09-06 | End: 2023-09-06 | Stop reason: HOSPADM

## 2023-09-06 RX ORDER — SODIUM CHLORIDE 0.9 % (FLUSH) 0.9 %
3 SYRINGE (ML) INJECTION
Status: CANCELLED | OUTPATIENT
Start: 2023-09-06

## 2023-09-06 RX ORDER — CEFAZOLIN SODIUM 1 G/3ML
3 INJECTION, POWDER, FOR SOLUTION INTRAMUSCULAR; INTRAVENOUS
Status: COMPLETED | OUTPATIENT
Start: 2023-09-06 | End: 2023-09-06

## 2023-09-06 RX ORDER — PROCHLORPERAZINE EDISYLATE 5 MG/ML
5 INJECTION INTRAMUSCULAR; INTRAVENOUS EVERY 30 MIN PRN
Status: CANCELLED | OUTPATIENT
Start: 2023-09-06

## 2023-09-06 RX ORDER — MEPERIDINE HYDROCHLORIDE 25 MG/ML
12.5 INJECTION INTRAMUSCULAR; INTRAVENOUS; SUBCUTANEOUS ONCE AS NEEDED
Status: CANCELLED | OUTPATIENT
Start: 2023-09-06 | End: 2023-09-07

## 2023-09-06 RX ORDER — MUPIROCIN 20 MG/G
OINTMENT TOPICAL
Status: DISPENSED | OUTPATIENT
Start: 2023-09-06

## 2023-09-06 RX ADMIN — FENTANYL CITRATE 100 MCG: 50 INJECTION, SOLUTION INTRAMUSCULAR; INTRAVENOUS at 08:09

## 2023-09-06 RX ADMIN — CEFAZOLIN 2 G: 330 INJECTION, POWDER, FOR SOLUTION INTRAMUSCULAR; INTRAVENOUS at 09:09

## 2023-09-06 RX ADMIN — MUPIROCIN: 20 OINTMENT TOPICAL at 07:09

## 2023-09-06 RX ADMIN — MIDAZOLAM HYDROCHLORIDE 1 MG: 1 INJECTION, SOLUTION INTRAMUSCULAR; INTRAVENOUS at 10:09

## 2023-09-06 RX ADMIN — MIDAZOLAM HYDROCHLORIDE 2 MG: 1 INJECTION, SOLUTION INTRAMUSCULAR; INTRAVENOUS at 08:09

## 2023-09-06 RX ADMIN — ROPIVACAINE HYDROCHLORIDE 30 ML: 5 INJECTION, SOLUTION EPIDURAL; INFILTRATION; PERINEURAL at 08:09

## 2023-09-06 RX ADMIN — MIDAZOLAM HYDROCHLORIDE 1 MG: 1 INJECTION, SOLUTION INTRAMUSCULAR; INTRAVENOUS at 09:09

## 2023-09-06 RX ADMIN — SODIUM CHLORIDE, SODIUM LACTATE, POTASSIUM CHLORIDE, AND CALCIUM CHLORIDE: 600; 310; 30; 20 INJECTION, SOLUTION INTRAVENOUS at 08:09

## 2023-09-06 NOTE — BRIEF OP NOTE
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    FOLLOWUP: In clinic    DISCHARGE INSTRUCTIONS:    Discharge Procedure Orders   Diet general     Sponge bath only until clinic visit     Leave dressing on - Keep it clean, dry, and intact until clinic visit     Call MD for:  temperature >100.4     Call MD for:  persistent nausea and vomiting     Call MD for:  severe uncontrolled pain     Call MD for:  difficulty breathing, headache or visual disturbances     Call MD for:  redness, tenderness, or signs of infection (pain, swelling, redness, odor or green/yellow discharge around incision site)     Call MD for:  hives     Call MD for:  persistent dizziness or light-headedness     Call MD for:  extreme fatigue

## 2023-09-06 NOTE — ANESTHESIA PROCEDURE NOTES
Peripheral Block    Patient location during procedure: pre-op    Reason for block: primary anesthetic    Diagnosis: left hand pain   Timeout: 9/6/2023 8:20 AM   End time: 9/6/2023 8:25 AM    Staffing  Authorizing Provider: Fabrice Coyle MD  Performing Provider: Fabrice Coyle MD    Staffing  Performed by: Fabrice Coyle MD  Authorized by: Fabrice Coyle MD    Preanesthetic Checklist  Completed: patient identified, IV checked, site marked, risks and benefits discussed, surgical consent, monitors and equipment checked, pre-op evaluation and timeout performed  Peripheral Block  Patient position: supine  Prep: ChloraPrep  Patient monitoring: heart rate, cardiac monitor, continuous pulse ox, continuous capnometry and frequent blood pressure checks  Block type: supraclavicular  Laterality: left  Injection technique: single shot  Needle  Needle type: Stimuplex   Needle gauge: 22 G  Needle length: 2 in  Needle localization: anatomical landmarks and ultrasound guidance   -ultrasound image captured on disc.  Assessment  Injection assessment: negative aspiration, negative parasthesia and local visualized surrounding nerve  Paresthesia pain: none  Heart rate change: no  Slow fractionated injection: yes  Pain Tolerance: comfortable throughout block and no complaints  Medications:    Medications: ropivacaine (NAROPIN) injection 0.5% - Perineural   30 mL - 9/6/2023 8:20:00 AM    Additional Notes  VSS.  [facility] RN monitoring vitals throughout procedure.  Patient tolerated procedure well.

## 2023-09-06 NOTE — ANESTHESIA POSTPROCEDURE EVALUATION
Anesthesia Post Evaluation    Patient: Vicky Alvarado    Procedure(s) Performed: Procedure(s) (LRB):  CARPECTOMY,LEFT PROXIMAL ROW (Left)    Final Anesthesia Type: MAC      Patient location during evaluation: OPS  Patient participation: Yes- Able to Participate  Level of consciousness: awake and alert  Post-procedure vital signs: reviewed and stable  Pain management: adequate  Airway patency: patent    PONV status at discharge: No PONV  Anesthetic complications: no      Cardiovascular status: blood pressure returned to baseline and hemodynamically stable  Respiratory status: room air, unassisted and spontaneous ventilation  Hydration status: euvolemic  Follow-up not needed.          Vitals Value Taken Time   BP 95/52 09/06/23 1109   Temp 98 09/06/23 1109   Pulse 59 09/06/23 1109   Resp 13 09/06/23 1109   SpO2 95% 09/06/23 1109         No case tracking events are documented in the log.      Pain/Tolu Score: No data recorded

## 2023-09-06 NOTE — INTERVAL H&P NOTE
The patient has been examined and the H&P has been reviewed:    I concur with the findings and no changes have occurred since H&P was written.    Surgery risks, benefits and alternative options discussed and understood by patient/family.          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

## 2023-09-06 NOTE — PLAN OF CARE
Vicky Rachel Christiano has met all discharge criteria from Phase II. Vital Signs are stable, ambulating  without difficulty. Discharge instructions given, patient verbalized understanding. Discharged from facility via wheelchair in stable condition.      Awaiting bedside delivery

## 2023-09-07 VITALS
DIASTOLIC BLOOD PRESSURE: 52 MMHG | OXYGEN SATURATION: 94 % | RESPIRATION RATE: 18 BRPM | SYSTOLIC BLOOD PRESSURE: 85 MMHG | TEMPERATURE: 98 F | HEART RATE: 62 BPM

## 2023-09-08 ENCOUNTER — PATIENT MESSAGE (OUTPATIENT)
Dept: ORTHOPEDICS | Facility: CLINIC | Age: 58
End: 2023-09-08
Payer: COMMERCIAL

## 2023-09-08 DIAGNOSIS — M92.212: Primary | ICD-10-CM

## 2023-09-08 RX ORDER — OXYCODONE AND ACETAMINOPHEN 5; 325 MG/1; MG/1
1 TABLET ORAL
Qty: 15 TABLET | Refills: 0 | Status: SHIPPED | OUTPATIENT
Start: 2023-09-08 | End: 2023-09-20

## 2023-09-08 NOTE — OP NOTE
[facility]  Surgery Department  Operative Note    SUMMARY     Date of Procedure: 9/6/2023     Procedure: Procedure(s) (LRB):  CARPECTOMY (Left) interposition arthroplasty, the C-arm fluoroscopy PIN neurectomy    Surgeon(s) and Role:     * Adelaida Hall MD - Primary    Assisting Surgeon:  Cory THOMAS    Pre-Operative Diagnosis: Left wrist pain [M25.532]  Kienböck's disease, left [M92.212]    Post-Operative Diagnosis: Post-Op Diagnosis Codes:     * Left wrist pain [M25.532]     * Kienböck's disease, left [M92.212]    Anesthesia: Monitor Anesthesia Care    Technical Procedures Used: surgery    Description of the Findings of the Procedure:  Indication for procedure Ms Alvarado is a 58-year-old female who had failed conservative treatment for left wrist pain patient had Kienbock's the lunate disruption after much discussion with the patient she elected for surgical intervention risks and benefits were explained to the patient in clinic consents were signed in clinic we did discuss possibility of ulnar shortening osteotomy specifically therapist seizure or doing a fusion because it did appear she also had DRUJ arthritis    Procedure in detail after the correct site was marked with the patient's participation the holding area patient underwent regional anesthesia was brought to the operating placed in supine position underwent MAC anesthesia well-padded nonsterile tourniquet was placed on the left upper extremity left upper extremities prepped draped normal sterile fashion time-out was conducted for the correct procedure to be indicated IV antibiotics given patient preoperatively dorsal incision was marked out over the 3 4 portal the arm was exsanguinated with an Esmarch tourniquet was insufflated 250 mmHg incision was made careful dissection down to the retinaculum the retinaculum was incised extensor tendons were gently retracted out the way PIN was identified and neurectomy was conducted the  capsule was T'd at this point and elevated the lunate was almost dissolve it was completely split in 2 pieces it was removed under C-arm fluoroscopy as well as the scaphoid and the triquetrum after all 3 bones were removed the cartilage did appear in normal position at the radius and the capitate therefore graft jacket was sutured into the volar capsule and before it was sutured over the bones based on the x-rays that we were taking the DRUJ looked more normal and therefore a small incision was made at the DRUJ capsule the ulna was placed through range of motion patient had very minimal arthritic changes of her ulna therefore we did not proceed with the swab Kapandji technique where we would fuse her ulnar to her distal radius we completed the interposition arthroplasty the areas irrigated copious amounts normal saline the retinaculum was closed Vicryl Monocryl Dermabond closed the skin sterile dressing was applied patient was placed in a well-padded splint tolerated suture well was brought to cover room in stable condition     Postop plans patient keep the dressing clean dry intact will be casted at 2 weeks cast for 4 weeks therapy to be initiated that    Significant Surgical Tasks Conducted by the Assistant(s), if Applicable: retraction    Complications: No    Estimated Blood Loss (EBL): * No values recorded between 9/6/2023  9:23 AM and 9/6/2023 11:11 AM *           Implants:   Implant Name Type Inv. Item Serial No.  Lot No. LRB No. Used Action   [id number]   [id number] [facility] 32 Implanted       Specimens:   Specimen (24h ago, onward)      None                    Condition: Good    Disposition: PACU - hemodynamically stable.    Attestation: I performed the procedure.    Discharge Note    SUMMARY     Admit Date: 9/6/2023    Discharge Date and Time: 9/6/2023 12:41 PM    Hospital Course (synopsis of major diagnoses, care, treatment, and services provided during the course of the hospital  stay): suregry     Final Diagnosis: Post-Op Diagnosis Codes:     * Left wrist pain [M25.532]     * Kienböck's disease, left [M92.212]    Disposition: Home or Self Care    Follow Up/Patient Instructions:     Medications:  Reconciled Home Medications:      Medication List        CONTINUE taking these medications      acetaminophen 500 MG tablet  Commonly known as: TYLENOL  Take 2 tablets (1,000 mg total) by mouth 2 (two) times daily as needed for Pain. Begin post op day 3.     albuterol 90 mcg/actuation inhaler  Commonly known as: VENTOLIN HFA  Inhale 2 puffs into the lungs every 6 (six) hours as needed for Wheezing. Rescue     ALPRAZolam 0.5 MG tablet  Commonly known as: XANAX  Take 1 tablet (0.5 mg total) by mouth 2 (two) times daily as needed for Anxiety.     aspirin 81 MG EC tablet  Commonly known as: ECOTRIN  Take 81 mg by mouth once daily.     atorvastatin 80 MG tablet  Commonly known as: LIPITOR  Take 1 tablet (80 mg total) by mouth once daily.     b complex vitamins capsule  Take 1 capsule by mouth once daily.     clopidogreL 75 mg tablet  Commonly known as: PLAVIX  Take 1 tablet (75 mg total) by mouth once daily.     colchicine (gout) 0.6 mg tablet  Commonly known as: COLCRYS  For gout attack: Take TWO tablets by mouth, then, 2 hours later, take ONE additional tablet. Then take 1 tablet twice daily only if still needed for gout pain.     DULoxetine 60 MG capsule  Commonly known as: CYMBALTA  Take 2 capsules (120 mg total) by mouth once daily.     furosemide 40 MG tablet  Commonly known as: LASIX  Take 1 tablet (40 mg total) by mouth 2 (two) times daily. Resume as needed for edema until followup with your PCP.     cepdiuva-twov-hdq0-C-radha-bosw 750 mg-644 mg- 30 mg-1 mg Tab  Take 1 tablet by mouth once daily.     ibuprofen 600 MG tablet  Commonly known as: ADVIL,MOTRIN  Take 1 tablet (600 mg total) by mouth 3 (three) times daily as needed for Pain. PO delivery     lisinopriL 40 MG tablet  Commonly known as:  PRINIVIL,ZESTRIL  Take 1 tablet (40 mg total) by mouth once daily.     MEGARED OMEGA-3 KRILL OIL ORAL  Take 1 capsule by mouth once daily.     metoprolol succinate 50 MG 24 hr tablet  Commonly known as: TOPROL-XL  Take 4 tablets (200 mg total) by mouth once daily.     MOUNJARO 10 mg/0.5 mL Pnij  Generic drug: tirzepatide  Inject 10 mg into the skin every 7 days.     multivitamin per tablet  Commonly known as: THERAGRAN  Take 1 tablet by mouth once daily.     NIFEdipine 90 MG (OSM) 24 hr tablet  Commonly known as: PROCARDIA-XL  Take 1 tablet (90 mg total) by mouth once daily. HOLD UNTIL FOLLOW-UP WITH YOUR PCP.     omeprazole 20 MG capsule  Commonly known as: PRILOSEC  TAKE 1 CAPSULE BY MOUTH ONCE DAILY     * oxyCODONE-acetaminophen 7.5-325 mg per tablet  Commonly known as: PERCOCET  Take 1 tablet by mouth every 8 (eight) hours as needed for Pain.     * oxyCODONE-acetaminophen 5-325 mg per tablet  Commonly known as: PERCOCET  Take 1 tablet by mouth every 4 to 6 hours as needed for Pain ((moderate-severe)). PO day 1-2     potassium chloride SA 20 MEQ tablet  Commonly known as: K-DUR,KLOR-CON  Take 1 tablet (20 mEq total) by mouth once daily. ONLY TAKE WITH LASIX.     propafenone 225 MG Tab  Commonly known as: RYTHMOL  Take 1 tablet (225 mg total) by mouth every 8 (eight) hours.     SENNA 8.6 mg tablet  Generic drug: senna  Take 1 tablet by mouth 2 (two) times daily.     traZODone 100 MG tablet  Commonly known as: DESYREL  Take 1 tablet (100 mg total) by mouth nightly as needed for Insomnia.     ULTRA-LIGHT ROLLATOR Misc  Generic drug: walker  As directed           * This list has 2 medication(s) that are the same as other medications prescribed for you. Read the directions carefully, and ask your doctor or other care provider to review them with you.                ASK your doctor about these medications      predniSONE 10 MG tablet  Commonly known as: DELTASONE  Take 1 tablet by mouth daily            Discharge  Procedure Orders   Diet general     Sponge bath only until clinic visit     Leave dressing on - Keep it clean, dry, and intact until clinic visit     Call MD for:  temperature >100.4     Call MD for:  persistent nausea and vomiting     Call MD for:  severe uncontrolled pain     Call MD for:  difficulty breathing, headache or visual disturbances     Call MD for:  redness, tenderness, or signs of infection (pain, swelling, redness, odor or green/yellow discharge around incision site)     Call MD for:  hives     Call MD for:  persistent dizziness or light-headedness     Call MD for:  extreme fatigue

## 2023-09-10 ENCOUNTER — PATIENT MESSAGE (OUTPATIENT)
Dept: BARIATRICS | Facility: CLINIC | Age: 58
End: 2023-09-10
Payer: COMMERCIAL

## 2023-09-10 ENCOUNTER — PATIENT MESSAGE (OUTPATIENT)
Dept: ELECTROPHYSIOLOGY | Facility: CLINIC | Age: 58
End: 2023-09-10
Payer: COMMERCIAL

## 2023-09-11 ENCOUNTER — TELEPHONE (OUTPATIENT)
Dept: ORTHOPEDICS | Facility: CLINIC | Age: 58
End: 2023-09-11
Payer: COMMERCIAL

## 2023-09-11 NOTE — TELEPHONE ENCOUNTER
Attempted to reach pt to discuss scheduling post op check. Requested a call back to the [facility], [place], operated by [facility] at [phone] for assistance scheduling with Echo STALLWORTH.

## 2023-09-15 NOTE — PROGRESS NOTES
Ms. Alvarado is a patient of Dr. Hawley and was last seen in clinic 4/11/2023.      Subjective:   Patient ID:  Vicky Alvarado is a 58 y.o. female who presents for follow up of Atrial Fibrillation  .     HPI:    Ms. Alvarado is a 58 y.o. female with HTN, AF (PVI 7/2020, 1/2022), GIB here for follow up after watchman implantation.    Background:      7/7/2020: 1/9/20, she had her upper teeth pulled for dentures and been on penicillin since that time. She was in SR. She takes atenolol 10 mg q.d. for hypertension. She underwent REENA/CV 1/29/2020. EF 40% in AF. PET Stress 3/2020 revealed no ischemia. Repeat echo in NSR showed EF 60%. She has had recurrence, once during carpal tunnel surgery, and several other times since.  Regarding her family history, her mother and brother both have atrial fibrillation. She is on elquis for CVA prophylaxis. CHADSVASC of at least 3. No bleeding issues with xarelto.      10/2020: PVI 7/20/2020. 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 She underwent emergent exploratory surgery by Dr Rose who ligated a branch of her left SFA. She had further wound issues requiring wash out and wound vac over the next few months. This has been a challenging recovery but she is nearing completion. No symptomatic or documented AF recurrence.     3/21: AF/RVR recurrence with ER visit. Flecainide 100 mg bid started 3/1/21. No recurrence since. Some PVCs    6/21: Her leg incision has healed. She has resumed work. Over the past month, she has developed more NAPIER.     9/21/21: HTN meds adjusted. Lasix started, hctz stopped, nifedipine started. Labs showed anemia. Endoscopy with no lesions. Respiratory evaluation performed without obvious culprit. LE edema worsened. She stopped flecainide with resolution of symptoms.  She has had some palpitations.     12/21: rythmol started for pAF. She feels that her AF has returned recently with some long sustained events. In SR today.  She feels that the AF is compromising her QOL and asks about repeat ablation.     4/22: Repeat ablation 1/22 with rare and brief, longest 1h. She has since developed  bleeding and is due for hysterectomy later this spring. Symptoms much improved.     10/2022: Pt stopped rhythmol but restarted 12/2022 due to recurrent AF symptoms.    4/11/2023: She had NAPIER leading to PET stress and then LHC. LHC with only distal, small vessel disease. EF normal 10/22. She has FE deficient anemia.   CHADSVASC of 4  HASBLED of 3  57 yoF here for AF management. She has no sustained arrhythmias since her last ablation. She has developed fatigue and Fe deficient anemia. I offered LAAO due to elevated bleeding and stroke risks. The patient can tolerate short term OAC but is not a candidate for long term OAC due to recurrent bleeding issues. I discussed management options regarding LAAO. I discussed the process of LAAO via Watchman including pre-procedure testing  as well as the need to take OAC for 6 weeks post implant. We discussed post procedure REENA studies to assess EMILIANO occlusion. Additionally I mentioned the need to take DAPT up to the 6 month point post implant.      Watchman with Anesthesia support    Update (09/19/2023):    2/2023: LHC:    The left main was normal  The LAD had luminal irregularity without obstructive disease  The proximal circumflex and large OMB were normal.  The distal circumflex supplying the distal OMB had 70-80% stenosis.  This was a small to medium-sized vessel.  The RCA had luminal irregularity distally with a 50% stenosis before the PDA.    6/12/2023: The left atrial appendage closure was successful  with a DEVICE WATCHMAN FLX CLSR 27MM device.    7/24/2023: Small 2mm peridevice leak seen. Eliquis stopped. Patient discharged on DAPT.    Today she still has some fatigue and chronic NAPIER although this has improved over time. No CP.  Had increased her metoprolol due to palps (she identifies as PACs and PVCs).  No sustained palps recently.  No presyncope or syncope.  Had recent surgery for avascular necrosis 2 weeks ago and is wearing cast to right arm.   On DAPT. On propafenone 225mg BID and toprol 200mg daily. Creatinine 1.3 on 8/29/2023.    I have personally reviewed the patient's EKG today, which shows sinus rhythm with RBBB at 67bpm. NV interval is 168. QRS is 158. QTc is 481.    Relevant Cardiac Test Results:    REENA (7/24/2023):  S/p 27 mm Watchman FLX implanted with 22 % compression. Minor 2mm peridevice leak seen  Normal systolic function.  The estimated ejection fraction is 55%.  EMILIANO occluder is present.  Iatrogenic ASD seen with left to right shunting  Right atrial enlargement.    Current Outpatient Medications   Medication Sig    acetaminophen (TYLENOL) 500 MG tablet Take 2 tablets (1,000 mg total) by mouth 2 (two) times daily as needed for Pain. Begin post op day 3.    albuterol (VENTOLIN HFA) 90 mcg/actuation inhaler Inhale 2 puffs into the lungs every 6 (six) hours as needed for Wheezing. Rescue    ALPRAZolam (XANAX) 0.5 MG tablet Take 1 tablet (0.5 mg total) by mouth 2 (two) times daily as needed for Anxiety.    aspirin (ECOTRIN) 81 MG EC tablet Take 81 mg by mouth once daily.    atorvastatin (LIPITOR) 80 MG tablet Take 1 tablet (80 mg total) by mouth once daily.    b complex vitamins capsule Take 1 capsule by mouth once daily.    clopidogreL (PLAVIX) 75 mg tablet Take 1 tablet (75 mg total) by mouth once daily.    colchicine, gout, (COLCRYS) 0.6 mg tablet For gout attack: Take TWO tablets by mouth, then, 2 hours later, take ONE additional tablet. Then take 1 tablet twice daily only if still needed for gout pain.    DULoxetine (CYMBALTA) 60 MG capsule Take 2 capsules (120 mg total) by mouth once daily.    furosemide (LASIX) 40 MG tablet Take 1 tablet (40 mg total) by mouth 2 (two) times daily. Resume as needed for edema until followup with your PCP.    gluc-shikha-[facility] [place] [place]#9-D-odqb-reymundo-bor 750 mg-644 mg- 30 mg-1 mg  Tab Take 1 tablet by mouth once daily.     ibuprofen (ADVIL,MOTRIN) 600 MG tablet Take 1 tablet (600 mg total) by mouth 3 (three) times daily as needed for Pain. PO delivery    krill/om-3/dha/epa/phospho/ast (MEGARED OMEGA-3 KRILL OIL ORAL) Take 1 capsule by mouth once daily.    lisinopriL (PRINIVIL,ZESTRIL) 40 MG tablet Take 1 tablet (40 mg total) by mouth once daily.    metoprolol succinate (TOPROL-XL) 50 MG 24 hr tablet Take 4 tablets (200 mg total) by mouth once daily.    multivitamin (THERAGRAN) per tablet Take 1 tablet by mouth once daily.    NIFEdipine (PROCARDIA-XL) 60 MG (OSM) 24 hr tablet Take 1 tablet (60 mg total) by mouth once daily.    omeprazole (PRILOSEC) 20 MG capsule TAKE 1 CAPSULE BY MOUTH ONCE DAILY    oxyCODONE-acetaminophen (PERCOCET) 5-325 mg per tablet Take 1 tablet by mouth every 4 to 6 hours as needed for Pain ((moderate-severe)).    oxyCODONE-acetaminophen (PERCOCET) 7.5-325 mg per tablet Take 1 tablet by mouth every 8 (eight) hours as needed for Pain.    potassium chloride SA (K-DUR,KLOR-CON) 20 MEQ tablet Take 1 tablet (20 mEq total) by mouth once daily. ONLY TAKE WITH LASIX.    predniSONE (DELTASONE) 10 MG tablet Take 1 tablet by mouth daily (Patient not taking: Reported on 8/22/2023)    propafenone (RYTHMOL) 225 MG Tab Take 1 tablet (225 mg total) by mouth every 8 (eight) hours.    SENNA 8.6 mg tablet Take 1 tablet by mouth 2 (two) times daily. (Patient taking differently: Take 1 tablet by mouth 2 (two) times daily. prn)    tirzepatide 10 mg/0.5 mL PnIj Inject 10 mg into the skin every 7 days. (Patient taking differently: Inject 10 mg into the skin every 7 days. PATIENT HAS ALREADY STOPPED)    traZODone (DESYREL) 100 MG tablet Take 1 tablet (100 mg total) by mouth nightly as needed for Insomnia.    walker (ULTRA-LIGHT ROLLATOR) Misc As directed     No current facility-administered medications for this visit.     Facility-Administered Medications Ordered in Other Visits   Medication     "mupirocin 2 % ointment       Review of Systems   Constitutional: Positive for malaise/fatigue.   Cardiovascular:  Positive for dyspnea on exertion. Negative for chest pain, irregular heartbeat, leg swelling and palpitations.   Respiratory:  Negative for shortness of breath.    Hematologic/Lymphatic: Negative for bleeding problem.   Skin:  Negative for rash.   Musculoskeletal:  Negative for myalgias.   Gastrointestinal:  Negative for hematemesis, hematochezia and nausea.   Genitourinary:  Negative for hematuria.   Neurological:  Negative for light-headedness.   Psychiatric/Behavioral:  Negative for altered mental status.    Allergic/Immunologic: Negative for persistent infections.       Objective:          /65   Pulse 67   Ht 5' 3" (1.6 m)   Wt 114.4 kg (252 lb 3.3 oz)   LMP 03/25/2022 (Exact Date)   BMI 44.68 kg/m²     Physical Exam  Vitals and nursing note reviewed.   Constitutional:       Appearance: Normal appearance. She is well-developed.   HENT:      Head: Normocephalic.      Nose: Nose normal.   Eyes:      Pupils: Pupils are equal, round, and reactive to light.   Cardiovascular:      Rate and Rhythm: Normal rate and regular rhythm.   Pulmonary:      Effort: No respiratory distress.      Breath sounds: Normal breath sounds.   Musculoskeletal:         General: Normal range of motion.   Skin:     General: Skin is warm and dry.      Findings: No erythema.   Neurological:      Mental Status: She is alert and oriented to person, place, and time.   Psychiatric:         Speech: Speech normal.         Behavior: Behavior normal.           Lab Results   Component Value Date     08/29/2023    K 4.4 08/29/2023    MG 1.9 08/25/2023    BUN 29 (H) 08/29/2023    CREATININE 1.3 08/29/2023    ALT 15 08/25/2023    AST 52 (H) 08/25/2023    HGB 14.3 08/25/2023    HCT 44.4 08/25/2023    HCT 28 (L) 08/11/2022    TSH 1.708 10/10/2022    LDLCALC 92.6 07/06/2021       Recent Labs   Lab 02/07/23  0915 04/18/23  1544 " 06/09/23  1444 06/12/23  0641   INR 1.1 1.2 1.1 1.1       Assessment:     1. Atrial Fibrillation (paroxysmal)    2. Essential hypertension    3. Morbid obesity with BMI of 45.0-49.9, adult    4. ERENDIRA (obstructive sleep apnea)      Plan:     In summary, Ms. Alvarado is a 58 y.o. female with HTN, AF (PVI 7/2020, 1/2022), GIB here for follow up after watchman implantation.  She is 3 moths s/p watchman implantation. REENA 7/24/2023 showed 2mm peridevice leak and eliquis was stopped and DAPT (ASA and plavix) started. She is doing well from a rhythm standpoint, with no recent sustained palpitations of propafenone. Unfortunately, she has developed RBBB. She also has [facility] confirmed CAD (see cath report 2/2023). Case previously discussed with Dr. Hawley. Will stop propafenone  - defer restarting AAD. If AF recurs, will update ECG off propafenone to determine most appropriate AAD therapy. Patient to continue DAPT therapy until 6 months post-implant.     Continue ASA and plavix. Can stop plavix 12/13/2023.  Stop propafenone  Contact clinic if AF recurs  Otherwise, RTC 6 mo, sooner if needed    *A copy of this note has been sent to Dr. Hawley*    Follow up in about 6 months (around 3/19/2024).    ------------------------------------------------------------------    SCOUT Payton, NP-C  Cardiac Electrophysiology

## 2023-09-19 ENCOUNTER — PATIENT MESSAGE (OUTPATIENT)
Dept: ORTHOPEDICS | Facility: CLINIC | Age: 58
End: 2023-09-19
Payer: COMMERCIAL

## 2023-09-19 ENCOUNTER — OFFICE VISIT (OUTPATIENT)
Dept: BARIATRICS | Facility: CLINIC | Age: 58
End: 2023-09-19
Payer: COMMERCIAL

## 2023-09-19 ENCOUNTER — OFFICE VISIT (OUTPATIENT)
Dept: ELECTROPHYSIOLOGY | Facility: CLINIC | Age: 58
End: 2023-09-19
Payer: COMMERCIAL

## 2023-09-19 ENCOUNTER — PATIENT MESSAGE (OUTPATIENT)
Dept: PAIN MEDICINE | Facility: CLINIC | Age: 58
End: 2023-09-19
Payer: COMMERCIAL

## 2023-09-19 VITALS
WEIGHT: 251.81 LBS | SYSTOLIC BLOOD PRESSURE: 108 MMHG | BODY MASS INDEX: 44.62 KG/M2 | OXYGEN SATURATION: 97 % | HEIGHT: 63 IN | HEART RATE: 73 BPM | DIASTOLIC BLOOD PRESSURE: 62 MMHG

## 2023-09-19 VITALS
HEART RATE: 67 BPM | WEIGHT: 252.19 LBS | SYSTOLIC BLOOD PRESSURE: 110 MMHG | DIASTOLIC BLOOD PRESSURE: 65 MMHG | BODY MASS INDEX: 44.68 KG/M2 | HEIGHT: 63 IN

## 2023-09-19 DIAGNOSIS — I10 ESSENTIAL HYPERTENSION: ICD-10-CM

## 2023-09-19 DIAGNOSIS — Z71.3 ENCOUNTER FOR WEIGHT LOSS COUNSELING: ICD-10-CM

## 2023-09-19 DIAGNOSIS — E66.01 MORBID OBESITY WITH BMI OF 45.0-49.9, ADULT: ICD-10-CM

## 2023-09-19 DIAGNOSIS — G47.33 OSA (OBSTRUCTIVE SLEEP APNEA): ICD-10-CM

## 2023-09-19 DIAGNOSIS — E78.00 PURE HYPERCHOLESTEROLEMIA: ICD-10-CM

## 2023-09-19 DIAGNOSIS — I48.0 PAF (PAROXYSMAL ATRIAL FIBRILLATION): Primary | ICD-10-CM

## 2023-09-19 DIAGNOSIS — E66.01 CLASS 3 SEVERE OBESITY DUE TO EXCESS CALORIES WITH SERIOUS COMORBIDITY AND BODY MASS INDEX (BMI) OF 40.0 TO 44.9 IN ADULT: Primary | ICD-10-CM

## 2023-09-19 DIAGNOSIS — E11.42 TYPE 2 DIABETES MELLITUS WITH DIABETIC POLYNEUROPATHY, WITHOUT LONG-TERM CURRENT USE OF INSULIN: Chronic | ICD-10-CM

## 2023-09-19 DIAGNOSIS — K76.0 NAFLD (NONALCOHOLIC FATTY LIVER DISEASE): Chronic | ICD-10-CM

## 2023-09-19 DIAGNOSIS — I49.8 OTHER SPECIFIED CARDIAC ARRHYTHMIAS: ICD-10-CM

## 2023-09-19 PROCEDURE — 93010 RHYTHM STRIP: ICD-10-PCS | Mod: S$GLB,,, | Performed by: INTERNAL MEDICINE

## 2023-09-19 PROCEDURE — 4010F ACE/ARB THERAPY RXD/TAKEN: CPT | Mod: CPTII,S$GLB,, | Performed by: STUDENT IN AN ORGANIZED HEALTH CARE EDUCATION/TRAINING PROGRAM

## 2023-09-19 PROCEDURE — 93005 ELECTROCARDIOGRAM TRACING: CPT | Mod: S$GLB,,, | Performed by: INTERNAL MEDICINE

## 2023-09-19 PROCEDURE — 4010F PR ACE/ARB THEARPY RXD/TAKEN: ICD-10-PCS | Mod: CPTII,S$GLB,, | Performed by: NURSE PRACTITIONER

## 2023-09-19 PROCEDURE — 99999 PR PBB SHADOW E&M-EST. PATIENT-LVL III: CPT | Mod: PBBFAC,,, | Performed by: STUDENT IN AN ORGANIZED HEALTH CARE EDUCATION/TRAINING PROGRAM

## 2023-09-19 PROCEDURE — 1159F PR MEDICATION LIST DOCUMENTED IN MEDICAL RECORD: ICD-10-PCS | Mod: CPTII,S$GLB,, | Performed by: NURSE PRACTITIONER

## 2023-09-19 PROCEDURE — 99999 PR PBB SHADOW E&M-EST. PATIENT-LVL III: ICD-10-PCS | Mod: PBBFAC,,, | Performed by: STUDENT IN AN ORGANIZED HEALTH CARE EDUCATION/TRAINING PROGRAM

## 2023-09-19 PROCEDURE — 3072F PR LOW RISK FOR RETINOPATHY: ICD-10-PCS | Mod: CPTII,S$GLB,, | Performed by: STUDENT IN AN ORGANIZED HEALTH CARE EDUCATION/TRAINING PROGRAM

## 2023-09-19 PROCEDURE — 3008F BODY MASS INDEX DOCD: CPT | Mod: CPTII,S$GLB,, | Performed by: NURSE PRACTITIONER

## 2023-09-19 PROCEDURE — 3008F PR BODY MASS INDEX (BMI) DOCUMENTED: ICD-10-PCS | Mod: CPTII,S$GLB,, | Performed by: STUDENT IN AN ORGANIZED HEALTH CARE EDUCATION/TRAINING PROGRAM

## 2023-09-19 PROCEDURE — 3008F PR BODY MASS INDEX (BMI) DOCUMENTED: ICD-10-PCS | Mod: CPTII,S$GLB,, | Performed by: NURSE PRACTITIONER

## 2023-09-19 PROCEDURE — 93010 ELECTROCARDIOGRAM REPORT: CPT | Mod: S$GLB,,, | Performed by: INTERNAL MEDICINE

## 2023-09-19 PROCEDURE — 1160F RVW MEDS BY RX/DR IN RCRD: CPT | Mod: CPTII,S$GLB,, | Performed by: STUDENT IN AN ORGANIZED HEALTH CARE EDUCATION/TRAINING PROGRAM

## 2023-09-19 PROCEDURE — 3074F PR MOST RECENT SYSTOLIC BLOOD PRESSURE < 130 MM HG: ICD-10-PCS | Mod: CPTII,S$GLB,, | Performed by: NURSE PRACTITIONER

## 2023-09-19 PROCEDURE — 3072F PR LOW RISK FOR RETINOPATHY: ICD-10-PCS | Mod: CPTII,S$GLB,, | Performed by: NURSE PRACTITIONER

## 2023-09-19 PROCEDURE — 1160F PR REVIEW ALL MEDS BY PRESCRIBER/CLIN PHARMACIST DOCUMENTED: ICD-10-PCS | Mod: CPTII,S$GLB,, | Performed by: STUDENT IN AN ORGANIZED HEALTH CARE EDUCATION/TRAINING PROGRAM

## 2023-09-19 PROCEDURE — 3072F LOW RISK FOR RETINOPATHY: CPT | Mod: CPTII,S$GLB,, | Performed by: STUDENT IN AN ORGANIZED HEALTH CARE EDUCATION/TRAINING PROGRAM

## 2023-09-19 PROCEDURE — 99213 PR OFFICE/OUTPT VISIT, EST, LEVL III, 20-29 MIN: ICD-10-PCS | Mod: S$GLB,,, | Performed by: STUDENT IN AN ORGANIZED HEALTH CARE EDUCATION/TRAINING PROGRAM

## 2023-09-19 PROCEDURE — 3078F PR MOST RECENT DIASTOLIC BLOOD PRESSURE < 80 MM HG: ICD-10-PCS | Mod: CPTII,S$GLB,, | Performed by: STUDENT IN AN ORGANIZED HEALTH CARE EDUCATION/TRAINING PROGRAM

## 2023-09-19 PROCEDURE — 99214 PR OFFICE/OUTPT VISIT, EST, LEVL IV, 30-39 MIN: ICD-10-PCS | Mod: S$GLB,,, | Performed by: NURSE PRACTITIONER

## 2023-09-19 PROCEDURE — 3078F DIAST BP <80 MM HG: CPT | Mod: CPTII,S$GLB,, | Performed by: NURSE PRACTITIONER

## 2023-09-19 PROCEDURE — 3078F DIAST BP <80 MM HG: CPT | Mod: CPTII,S$GLB,, | Performed by: STUDENT IN AN ORGANIZED HEALTH CARE EDUCATION/TRAINING PROGRAM

## 2023-09-19 PROCEDURE — 4010F PR ACE/ARB THEARPY RXD/TAKEN: ICD-10-PCS | Mod: CPTII,S$GLB,, | Performed by: STUDENT IN AN ORGANIZED HEALTH CARE EDUCATION/TRAINING PROGRAM

## 2023-09-19 PROCEDURE — 3074F SYST BP LT 130 MM HG: CPT | Mod: CPTII,S$GLB,, | Performed by: NURSE PRACTITIONER

## 2023-09-19 PROCEDURE — 1160F RVW MEDS BY RX/DR IN RCRD: CPT | Mod: CPTII,S$GLB,, | Performed by: NURSE PRACTITIONER

## 2023-09-19 PROCEDURE — 3072F LOW RISK FOR RETINOPATHY: CPT | Mod: CPTII,S$GLB,, | Performed by: NURSE PRACTITIONER

## 2023-09-19 PROCEDURE — 3078F PR MOST RECENT DIASTOLIC BLOOD PRESSURE < 80 MM HG: ICD-10-PCS | Mod: CPTII,S$GLB,, | Performed by: NURSE PRACTITIONER

## 2023-09-19 PROCEDURE — 3074F SYST BP LT 130 MM HG: CPT | Mod: CPTII,S$GLB,, | Performed by: STUDENT IN AN ORGANIZED HEALTH CARE EDUCATION/TRAINING PROGRAM

## 2023-09-19 PROCEDURE — 4010F ACE/ARB THERAPY RXD/TAKEN: CPT | Mod: CPTII,S$GLB,, | Performed by: NURSE PRACTITIONER

## 2023-09-19 PROCEDURE — 1160F PR REVIEW ALL MEDS BY PRESCRIBER/CLIN PHARMACIST DOCUMENTED: ICD-10-PCS | Mod: CPTII,S$GLB,, | Performed by: NURSE PRACTITIONER

## 2023-09-19 PROCEDURE — 93005 RHYTHM STRIP: ICD-10-PCS | Mod: S$GLB,,, | Performed by: INTERNAL MEDICINE

## 2023-09-19 PROCEDURE — 99999 PR PBB SHADOW E&M-EST. PATIENT-LVL V: CPT | Mod: PBBFAC,,, | Performed by: NURSE PRACTITIONER

## 2023-09-19 PROCEDURE — 1159F PR MEDICATION LIST DOCUMENTED IN MEDICAL RECORD: ICD-10-PCS | Mod: CPTII,S$GLB,, | Performed by: STUDENT IN AN ORGANIZED HEALTH CARE EDUCATION/TRAINING PROGRAM

## 2023-09-19 PROCEDURE — 99213 OFFICE O/P EST LOW 20 MIN: CPT | Mod: S$GLB,,, | Performed by: STUDENT IN AN ORGANIZED HEALTH CARE EDUCATION/TRAINING PROGRAM

## 2023-09-19 PROCEDURE — 99999 PR PBB SHADOW E&M-EST. PATIENT-LVL V: ICD-10-PCS | Mod: PBBFAC,,, | Performed by: NURSE PRACTITIONER

## 2023-09-19 PROCEDURE — 1159F MED LIST DOCD IN RCRD: CPT | Mod: CPTII,S$GLB,, | Performed by: STUDENT IN AN ORGANIZED HEALTH CARE EDUCATION/TRAINING PROGRAM

## 2023-09-19 PROCEDURE — 99214 OFFICE O/P EST MOD 30 MIN: CPT | Mod: S$GLB,,, | Performed by: NURSE PRACTITIONER

## 2023-09-19 PROCEDURE — 3008F BODY MASS INDEX DOCD: CPT | Mod: CPTII,S$GLB,, | Performed by: STUDENT IN AN ORGANIZED HEALTH CARE EDUCATION/TRAINING PROGRAM

## 2023-09-19 PROCEDURE — 1159F MED LIST DOCD IN RCRD: CPT | Mod: CPTII,S$GLB,, | Performed by: NURSE PRACTITIONER

## 2023-09-19 PROCEDURE — 3074F PR MOST RECENT SYSTOLIC BLOOD PRESSURE < 130 MM HG: ICD-10-PCS | Mod: CPTII,S$GLB,, | Performed by: STUDENT IN AN ORGANIZED HEALTH CARE EDUCATION/TRAINING PROGRAM

## 2023-09-19 RX ORDER — TIRZEPATIDE 12.5 MG/.5ML
12.5 INJECTION, SOLUTION SUBCUTANEOUS
Qty: 4 PEN | Refills: 2 | Status: SHIPPED | OUTPATIENT
Start: 2023-09-19 | End: 2023-12-12 | Stop reason: SDUPTHER

## 2023-09-19 NOTE — PROGRESS NOTES
Subjective     Patient ID: Vicky Alvarado is a 58 y.o. female.    Chief Complaint: Follow-up, Obesity, and Weight Check    Patient presents for treatment of obesity.     Co-morbidities  HTN  HLD  DM2  ERENDIRA  GERD  NAFLD    Negative for thyroid cancer    H/o in hysterectomy    Current Physical Activity  No regular exercise routine    Current Eating Habits  Breakfast - protein shake  Lunch - skips  Dinner - sister cooks dinner; avoiding fried foods and fast food, eating smaller portions  Snacks  Beverages - water, SF gatorade    Medical Weight Loss  4/21/2023: 286.4 lbs, BMI 50.7, BFP 54.2%, .2 lbs, SMM 71.7 lbs, BMR 1654 kcal  6/7/2023: 273.8 lbs, BMI 48.5, BFP 55%, .6 lbs, SMM 65.9 lbs, BMR 1578 kcal  9/19/2023: 251.8 lbs, BMI 44.6, BFP 54.4%,  lbs, SMM 63.5 lbs, BMR 1494 kcal      Review of Systems   Constitutional:  Negative for chills and fever.   Respiratory:  Negative for shortness of breath.    Cardiovascular:  Negative for chest pain and palpitations.   Gastrointestinal:  Negative for abdominal pain, nausea and vomiting.   Neurological:  Negative for dizziness and light-headedness.   Psychiatric/Behavioral:  The patient is not nervous/anxious.           Objective    Latest Reference Range & Units 03/31/23 16:14 04/18/23 15:44   WBC 3.90 - 12.70 K/uL 7.13 7.35   RBC 4.00 - 5.40 M/uL 3.65 (L) 4.10   Hemoglobin 12.0 - 16.0 g/dL 8.3 (L) 9.6 (L)   Hematocrit 37.0 - 48.5 % 30.8 (L) 33.3 (L)   MCV 82 - 98 fL 84 81 (L)   MCH 27.0 - 31.0 pg 22.7 (L) 23.4 (L)   MCHC 32.0 - 36.0 g/dL 26.9 (L) 28.8 (L)   RDW 11.5 - 14.5 % 20.8 (H) 19.5 (H)   Platelets 150 - 450 K/uL 166 254   MPV 9.2 - 12.9 fL 11.4 11.0   Gran % 38.0 - 73.0 % 76.0 (H) 80.7 (H)   Lymph % 18.0 - 48.0 % 13.3 (L) 11.6 (L)   Mono % 4.0 - 15.0 % 7.2 4.9   Eosinophil % 0.0 - 8.0 % 2.1 1.4   Basophil % 0.0 - 1.9 % 1.0 1.1   Immature Granulocytes 0.0 - 0.5 % 0.4 0.3   Gran # (ANC) 1.8 - 7.7 K/uL 5.4 5.9   Lymph # 1.0 - 4.8 K/uL 1.0 0.9  (L)   Mono # 0.3 - 1.0 K/uL 0.5 0.4   Eos # 0.0 - 0.5 K/uL 0.2 0.1   Baso # 0.00 - 0.20 K/uL 0.07 0.08   Immature Grans (Abs) 0.00 - 0.04 K/uL 0.03 0.02   nRBC 0 /100 WBC 0 0   Differential Method  Automated Automated   Iron 30 - 160 ug/dL 49    TIBC 250 - 450 ug/dL 459 (H)    Saturated Iron 20 - 50 % 11 (L)    Transferrin 200 - 375 mg/dL 310    Ferritin 20.0 - 300.0 ng/mL 19 (L)    Folate 4.0 - 24.0 ng/mL 5.7    Vitamin B-12 210 - 950 pg/mL 981 (H)    Haptoglobin 30 - 250 mg/dL 196    Pathologist Review Peripheral Smear  REVIEWED    Retic 0.5 - 2.5 % 3.3 (H)    Pathologist Review  Review completed    Protime 9.0 - 12.5 sec  12.5   INR 0.8 - 1.2   1.2   Sodium 136 - 145 mmol/L 141 141   Potassium 3.5 - 5.1 mmol/L 3.9 3.1 (L)   Chloride 95 - 110 mmol/L 102 98   CO2 23 - 29 mmol/L 27 25   Anion Gap 8 - 16 mmol/L 12 18 (H)   BUN 6 - 20 mg/dL 8 11   Creatinine 0.5 - 1.4 mg/dL 0.8 1.0   eGFR >60 mL/min/1.73 m^2 >60.0 >60.0   Glucose 70 - 110 mg/dL 104 99   Calcium 8.7 - 10.5 mg/dL 9.1 9.3   Alkaline Phosphatase 55 - 135 U/L 247 (H) 222 (H)   PROTEIN TOTAL 6.0 - 8.4 g/dL 7.1 7.9   Albumin 3.5 - 5.2 g/dL 2.9 (L) 3.4 (L)   BILIRUBIN TOTAL 0.1 - 1.0 mg/dL 1.4 (H) 1.4 (H)   AST 10 - 40 U/L 45 (H) 34   ALT 10 - 44 U/L 11 10    - 260 U/L 209    Copper 810 - 1990 ug/L 1519    (L): Data is abnormally low  (H): Data is abnormally high    Vitals:    09/19/23 0918   BP: 108/62   Pulse: 73         Physical Exam  Vitals reviewed.   Constitutional:       General: She is not in acute distress.     Appearance: Normal appearance. She is obese. She is not ill-appearing, toxic-appearing or diaphoretic.   HENT:      Head: Normocephalic and atraumatic.   Cardiovascular:      Rate and Rhythm: Normal rate.   Pulmonary:      Effort: Pulmonary effort is normal. No respiratory distress.   Skin:     General: Skin is warm and dry.   Neurological:      Mental Status: She is alert and oriented to person, place, and time.            Assessment  and Plan     Problem List Items Addressed This Visit       Type 2 diabetes mellitus with diabetic polyneuropathy, without long-term current use of insulin (Chronic)    Relevant Medications    tirzepatide (MOUNJARO) 12.5 mg/0.5 mL PnIj    NAFLD (nonalcoholic fatty liver disease) (Chronic)    Relevant Medications    tirzepatide (MOUNJARO) 12.5 mg/0.5 mL PnIj    Essential hypertension    Relevant Medications    tirzepatide (MOUNJARO) 12.5 mg/0.5 mL PnIj    ERNEDIRA (obstructive sleep apnea)    Relevant Medications    tirzepatide (MOUNJARO) 12.5 mg/0.5 mL PnIj    Pure hypercholesterolemia    Relevant Medications    tirzepatide (MOUNJARO) 12.5 mg/0.5 mL PnIj     Other Visit Diagnoses       Class 3 severe obesity due to excess calories with serious comorbidity and body mass index (BMI) of 40.0 to 44.9 in adult    -  Primary    Relevant Medications    tirzepatide (MOUNJARO) 12.5 mg/0.5 mL PnIj    Encounter for weight loss counseling                - Mounjaro 12.5 mg weekly injections    - Log all food and beverage intake with a daily calorie goal of 1200 calories per day; bariatric diet plan    - Low intensity aerobic exercise for 15-30 minutes 3-5x/week

## 2023-09-20 ENCOUNTER — OFFICE VISIT (OUTPATIENT)
Dept: ORTHOPEDICS | Facility: CLINIC | Age: 58
End: 2023-09-20
Payer: COMMERCIAL

## 2023-09-20 VITALS — WEIGHT: 251.75 LBS | BODY MASS INDEX: 44.61 KG/M2 | HEIGHT: 63 IN

## 2023-09-20 DIAGNOSIS — R52 PAIN: Primary | ICD-10-CM

## 2023-09-20 DIAGNOSIS — M92.212: ICD-10-CM

## 2023-09-20 DIAGNOSIS — Z98.890 POST-OPERATIVE STATE: Primary | ICD-10-CM

## 2023-09-20 PROCEDURE — 99999 PR PBB SHADOW E&M-EST. PATIENT-LVL V: ICD-10-PCS | Mod: PBBFAC,,, | Performed by: PHYSICIAN ASSISTANT

## 2023-09-20 PROCEDURE — 99024 PR POST-OP FOLLOW-UP VISIT: ICD-10-PCS | Mod: S$GLB,,, | Performed by: PHYSICIAN ASSISTANT

## 2023-09-20 PROCEDURE — 99999 PR PBB SHADOW E&M-EST. PATIENT-LVL V: CPT | Mod: PBBFAC,,, | Performed by: PHYSICIAN ASSISTANT

## 2023-09-20 PROCEDURE — 3072F PR LOW RISK FOR RETINOPATHY: ICD-10-PCS | Mod: CPTII,S$GLB,, | Performed by: PHYSICIAN ASSISTANT

## 2023-09-20 PROCEDURE — 1159F PR MEDICATION LIST DOCUMENTED IN MEDICAL RECORD: ICD-10-PCS | Mod: CPTII,S$GLB,, | Performed by: PHYSICIAN ASSISTANT

## 2023-09-20 PROCEDURE — 99024 POSTOP FOLLOW-UP VISIT: CPT | Mod: S$GLB,,, | Performed by: PHYSICIAN ASSISTANT

## 2023-09-20 PROCEDURE — 3072F LOW RISK FOR RETINOPATHY: CPT | Mod: CPTII,S$GLB,, | Performed by: PHYSICIAN ASSISTANT

## 2023-09-20 PROCEDURE — 3008F PR BODY MASS INDEX (BMI) DOCUMENTED: ICD-10-PCS | Mod: CPTII,S$GLB,, | Performed by: PHYSICIAN ASSISTANT

## 2023-09-20 PROCEDURE — 4010F ACE/ARB THERAPY RXD/TAKEN: CPT | Mod: CPTII,S$GLB,, | Performed by: PHYSICIAN ASSISTANT

## 2023-09-20 PROCEDURE — 29075 PR APPLY FOREARM CAST: ICD-10-PCS | Mod: LT,S$GLB,, | Performed by: PHYSICIAN ASSISTANT

## 2023-09-20 PROCEDURE — 4010F PR ACE/ARB THEARPY RXD/TAKEN: ICD-10-PCS | Mod: CPTII,S$GLB,, | Performed by: PHYSICIAN ASSISTANT

## 2023-09-20 PROCEDURE — 3008F BODY MASS INDEX DOCD: CPT | Mod: CPTII,S$GLB,, | Performed by: PHYSICIAN ASSISTANT

## 2023-09-20 PROCEDURE — 29075 APPL CST ELBW FNGR SHORT ARM: CPT | Mod: LT,S$GLB,, | Performed by: PHYSICIAN ASSISTANT

## 2023-09-20 PROCEDURE — 1159F MED LIST DOCD IN RCRD: CPT | Mod: CPTII,S$GLB,, | Performed by: PHYSICIAN ASSISTANT

## 2023-09-20 RX ORDER — HYDROCODONE BITARTRATE AND ACETAMINOPHEN 5; 325 MG/1; MG/1
1 TABLET ORAL EVERY 6 HOURS PRN
Qty: 20 TABLET | Refills: 0 | Status: SHIPPED | OUTPATIENT
Start: 2023-09-20 | End: 2024-03-01 | Stop reason: SDUPTHER

## 2023-09-20 NOTE — PROGRESS NOTES
"Ms. Alvarado is here today for a post-operative visit.  She is 14 days status post left wrist proximal carpectomy with graft jacket and PIN neurectomy by Dr. Hall on 9/6/23. She reports that she is doing well overall. Pain is intermittent she ran out of percocet and has been taking ibuprofen/ tylenol.  She denies fever, chills, and sweats since the time of the surgery.     Physical exam:    Vitals:    09/20/23 0940   Weight: 114.2 kg (251 lb 12.3 oz)   Height: 5' 3" (1.6 m)   PainSc:   8   PainLoc: Wrist     Vital signs are stable, patient is afebrile.  Patient is well dressed and well groomed, no acute distress.  Alert and oriented to person, place, and time.  Post op dressing taken down.  Incision is clean, dry and intact.  There is no erythema or exudate.  There is no sign of any infection. She is NVI. Fair finger motion unable to make a composite fist.     Assessment: status post left wrist proximal carpectomy with graft jacket and PIN neurectomy by Dr. Hall on 9/6/23    Plan:  Vicky was seen today for pain.    Diagnoses and all orders for this visit:    Post-operative state  -     Ambulatory referral/consult to Physical/Occupational Therapy; Future  -     Ambulatory referral/consult to Physical/Occupational Therapy; Future    Kienböck's disease, left  -     Ambulatory referral/consult to Physical/Occupational Therapy; Future  -     Ambulatory referral/consult to Physical/Occupational Therapy; Future      PO instruction reviewed and provided to patient  Transition to left short arm fiberglass cast   Therapy ordered to begin 6 wk PO   RTC 4 wks with xray plan to transition to bracing and therapy         "

## 2023-10-01 ENCOUNTER — PATIENT MESSAGE (OUTPATIENT)
Dept: ORTHOPEDICS | Facility: CLINIC | Age: 58
End: 2023-10-01
Payer: COMMERCIAL

## 2023-10-02 DIAGNOSIS — Z98.890 POST-OPERATIVE STATE: Primary | ICD-10-CM

## 2023-10-02 RX ORDER — TRAMADOL HYDROCHLORIDE 50 MG/1
50 TABLET ORAL EVERY 6 HOURS PRN
Qty: 15 TABLET | Refills: 0 | Status: SHIPPED | OUTPATIENT
Start: 2023-10-02

## 2023-10-05 ENCOUNTER — PATIENT MESSAGE (OUTPATIENT)
Dept: PAIN MEDICINE | Facility: CLINIC | Age: 58
End: 2023-10-05
Payer: COMMERCIAL

## 2023-10-05 ENCOUNTER — TELEPHONE (OUTPATIENT)
Dept: PAIN MEDICINE | Facility: CLINIC | Age: 58
End: 2023-10-05
Payer: COMMERCIAL

## 2023-10-05 NOTE — TELEPHONE ENCOUNTER
----- Message from Edmund Condon sent at 10/5/2023  1:25 PM CDT -----      Name of Who is Calling: SHAWNA GRAHAM [[id number]]      What is the request in detail: Pt returned call to the office regarding procedure.Please contact to further discuss and advise.    .      Can the clinic reply by [facility]: Y      What Number to Call Back if not in [facility]: [phone]

## 2023-10-09 ENCOUNTER — PATIENT MESSAGE (OUTPATIENT)
Dept: BARIATRICS | Facility: CLINIC | Age: 58
End: 2023-10-09
Payer: COMMERCIAL

## 2023-10-09 ENCOUNTER — PATIENT MESSAGE (OUTPATIENT)
Dept: PAIN MEDICINE | Facility: CLINIC | Age: 58
End: 2023-10-09
Payer: COMMERCIAL

## 2023-10-15 DIAGNOSIS — I10 ESSENTIAL HYPERTENSION: Chronic | ICD-10-CM

## 2023-10-15 RX ORDER — POTASSIUM CHLORIDE 20 MEQ/1
20 TABLET, EXTENDED RELEASE ORAL DAILY
Qty: 30 TABLET | Refills: 11 | Status: CANCELLED | OUTPATIENT
Start: 2023-10-15

## 2023-10-16 ENCOUNTER — TELEPHONE (OUTPATIENT)
Dept: ORTHOPEDICS | Facility: CLINIC | Age: 58
End: 2023-10-16
Payer: COMMERCIAL

## 2023-10-16 NOTE — TELEPHONE ENCOUNTER
Spoke c Pt. Confirmed appt. location & time on 10/19/23, including XRdane.  Pt. expressed understanding & was thankful.

## 2023-10-18 DIAGNOSIS — I10 ESSENTIAL HYPERTENSION: Chronic | ICD-10-CM

## 2023-10-18 RX ORDER — POTASSIUM CHLORIDE 20 MEQ/1
20 TABLET, EXTENDED RELEASE ORAL DAILY
Qty: 30 TABLET | Refills: 11 | Status: CANCELLED | OUTPATIENT
Start: 2023-10-15

## 2023-10-19 ENCOUNTER — OFFICE VISIT (OUTPATIENT)
Dept: ORTHOPEDICS | Facility: CLINIC | Age: 58
End: 2023-10-19
Payer: COMMERCIAL

## 2023-10-19 ENCOUNTER — HOSPITAL ENCOUNTER (OUTPATIENT)
Dept: RADIOLOGY | Facility: OTHER | Age: 58
Discharge: HOME | End: 2023-10-19
Attending: PHYSICIAN ASSISTANT
Payer: COMMERCIAL

## 2023-10-19 VITALS — HEIGHT: 63 IN | WEIGHT: 251.75 LBS | BODY MASS INDEX: 44.61 KG/M2

## 2023-10-19 DIAGNOSIS — Z98.890 POST-OPERATIVE STATE: Primary | ICD-10-CM

## 2023-10-19 DIAGNOSIS — R52 PAIN: ICD-10-CM

## 2023-10-19 PROCEDURE — 73110 X-RAY EXAM OF WRIST: CPT | Mod: TC,FY,LT

## 2023-10-19 PROCEDURE — 99999 PR PBB SHADOW E&M-EST. PATIENT-LVL II: ICD-10-PCS | Mod: PBBFAC,,, | Performed by: PHYSICIAN ASSISTANT

## 2023-10-19 PROCEDURE — 1159F PR MEDICATION LIST DOCUMENTED IN MEDICAL RECORD: ICD-10-PCS | Mod: CPTII,S$GLB,, | Performed by: PHYSICIAN ASSISTANT

## 2023-10-19 PROCEDURE — 3072F LOW RISK FOR RETINOPATHY: CPT | Mod: CPTII,S$GLB,, | Performed by: PHYSICIAN ASSISTANT

## 2023-10-19 PROCEDURE — 4010F PR ACE/ARB THEARPY RXD/TAKEN: ICD-10-PCS | Mod: CPTII,S$GLB,, | Performed by: PHYSICIAN ASSISTANT

## 2023-10-19 PROCEDURE — 3072F PR LOW RISK FOR RETINOPATHY: ICD-10-PCS | Mod: CPTII,S$GLB,, | Performed by: PHYSICIAN ASSISTANT

## 2023-10-19 PROCEDURE — 73110 XR WRIST COMPLETE 3 VIEWS LEFT: ICD-10-PCS | Mod: 26,LT,, | Performed by: RADIOLOGY

## 2023-10-19 PROCEDURE — 99024 POSTOP FOLLOW-UP VISIT: CPT | Mod: S$GLB,,, | Performed by: PHYSICIAN ASSISTANT

## 2023-10-19 PROCEDURE — 99999 PR PBB SHADOW E&M-EST. PATIENT-LVL II: CPT | Mod: PBBFAC,,, | Performed by: PHYSICIAN ASSISTANT

## 2023-10-19 PROCEDURE — 1159F MED LIST DOCD IN RCRD: CPT | Mod: CPTII,S$GLB,, | Performed by: PHYSICIAN ASSISTANT

## 2023-10-19 PROCEDURE — 73110 X-RAY EXAM OF WRIST: CPT | Mod: 26,LT,, | Performed by: RADIOLOGY

## 2023-10-19 PROCEDURE — 99024 PR POST-OP FOLLOW-UP VISIT: ICD-10-PCS | Mod: S$GLB,,, | Performed by: PHYSICIAN ASSISTANT

## 2023-10-19 PROCEDURE — 4010F ACE/ARB THERAPY RXD/TAKEN: CPT | Mod: CPTII,S$GLB,, | Performed by: PHYSICIAN ASSISTANT

## 2023-10-19 NOTE — PROGRESS NOTES
"Ms. Alvarado is here today for a post-operative visit.  She is 6 weeks status post left wrist proximal carpectomy with graft jacket and PIN neurectomy by Dr. Hall on 9/6/23. She reports that she is doing well overall. Pain has gradually improved, not taking regular pain medication. Therapy scheduled to begin tomorrow.  She denies fever, chills, and sweats since the time of the surgery.     Physical exam:    Vitals:    10/19/23 1014   Weight: 114.2 kg (251 lb 12.3 oz)   Height: 5' 3" (1.6 m)   PainSc:   3   PainLoc: Wrist     Vital signs are stable, patient is afebrile.  Patient is well dressed and well groomed, no acute distress.  Alert and oriented to person, place, and time.  Cast removed. Incision well healed. There is no erythema or exudate.  There is no sign of any infection. She is NVI. Good finger motion, fair wrist motion.    Assessment: status post left wrist proximal carpectomy with graft jacket and PIN neurectomy by Dr. Hall on 9/6/23    Plan:  Vicky was seen today for pain.    Diagnoses and all orders for this visit:    Post-operative state      PO instruction reviewed and provided to patient  Transition to left wrist brace   Therapy scheduled to begin tomorrow   RTC 6 wks           "

## 2023-10-20 ENCOUNTER — CLINICAL SUPPORT (OUTPATIENT)
Dept: REHABILITATION | Facility: HOSPITAL | Age: 58
End: 2023-10-20
Payer: COMMERCIAL

## 2023-10-20 DIAGNOSIS — M92.212: ICD-10-CM

## 2023-10-20 DIAGNOSIS — Z98.890 POST-OPERATIVE STATE: ICD-10-CM

## 2023-10-20 PROCEDURE — 97165 OT EVAL LOW COMPLEX 30 MIN: CPT

## 2023-10-20 PROCEDURE — 97110 THERAPEUTIC EXERCISES: CPT

## 2023-10-20 NOTE — PATIENT INSTRUCTIONS
[facility], OCCUPATIONAL THERAPY  HOME EXERCISE PROGRAM               Complete massage 2-3 minutes 4 times a day:        Complete 10 repetitions of each exercise 4-6 times a day:                                         Copyright © I. All rights reserved.     Therapist: MARCOS Gee/L, CHT

## 2023-10-20 NOTE — PLAN OF CARE
"[facility]  Occupational Therapy Initial Evaluation    Date: 10/20/2023  Name: Vicky Alvarado  Clinic Number: [id number]    Therapy Diagnosis:   Encounter Diagnoses   Name Primary?    Post-operative state     Kienböck's disease, left      Physician: Echo Aguirre PA-C    Physician Orders: 2x/wk x 8-12 weeks   Medical Diagnosis: Left wrist pain [M25.532]  Kienböck's disease, left [M92.212]     Surgical Procedure and Date OR (Conservative Treatment) Date of Injury/Onset: status post left wrist proximal carpectomy with graft jacket and PIN neurectomy by Dr. Hall on 9/6/23     Evaluation Date: 10/20/2023  Insurance Authorization Period Expiration: 9/19/2023  Plan of Care Expiration: 1/12/2024  Date of Return to MD: Early December  Visit # / Visits authorized: 1 / 1  FOTO: (date and score)    Precautions:  Standard, Diabetes, and Weightbearing, fall risk    Time In: 11:00 am  Time Out: 11:58 am  Total Appointment Time (timed & untimed codes): 58 minutes      SUBJECTIVE     Date of Onset/ History of Current Condition/Mechanism of Injury: Vicky reports: "long as I can remember."    Falls: Feb 2023     Dominant Side: Right  Involved Side: left  Imaging:  Reviewed  Prior Therapy: NA    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    Functional Limitations/Social History:    Previous functional status includes Limited due to pain and edema left hand.          Limitation of Functional Status as follows:     ADLs/IADLs: Reports that " I can't do anything right now."  Modified independence all ADL's. Reports independent driving using right hand only.         Leisure: no limitations    Pain:  Functional Pain Scale Rating 0-10 and Location: NA. Just out of cast.  Current 3/10,   worst 6/10  best 0/10     Description: "Aching, throbbing" "Occasionally I'll get a sharp pain."  Aggravating and Easing Factors: Movement causes " "pain. Rest reduces it. Taking OTC pain medications.      Patient's Goals for Therapy: "less pain,, less swelling, greater mobility"    Medical History:   Past Medical History:   Diagnosis Date    Anticoagulant long-term use     Arthritis     Atrial fibrillation     cardioversion    Atrial fibrillation with RVR     HAS WATCHMAN IN PLACE NOW    Avascular necrosis     L hand    CHF (congestive heart failure)     Chronic fatigue     Depression     Diabetes mellitus     Encounter for blood transfusion 07/22/2020    Encounter for blood transfusion 03/2022    Fatty liver     GERD (gastroesophageal reflux disease)     Hx of psychiatric care     Hypertension     Iron deficiency anemia 05/07/2022    TIBC 444 with saturated iron 8    Liver disease     Obese     Pre-diabetes 05/07/2022    Psychiatric problem     Sleep apnea     wears cpap    SOB (shortness of breath) on exertion     Weight loss     75lb intentional weight loss       Surgical History:    has a past surgical history that includes Treatment of cardiac arrhythmia (N/A, 01/29/2020); Foot surgery; Carpal tunnel release (Right, 06/10/2020); Exploration of femoral artery (Left, 07/21/2020); Ablation of arrhythmogenic focus for atrial fibrillation (N/A, 07/20/2020); Application of wound vacuum-assisted closure device (Left, 08/03/2020); Wound debridement (Left, 08/06/2020); Closure of wound (Left, 08/06/2020); Application of wound vacuum-assisted closure device (Left, 08/06/2020); Carpal tunnel release (Left, 05/05/2021); Esophagogastroduodenoscopy (N/A, 08/17/2021); Colonoscopy (N/A, 08/17/2021); Ablation of arrhythmogenic focus for atrial fibrillation (N/A, 01/24/2022); Treatment of cardiac arrhythmia (01/24/2022); Hysteroscopy with dilation and curettage of uterus (N/A, 02/19/2022); Incision and drainage of knee (Left, 05/12/2022); Robot-assisted laparoscopic abdominal hysterectomy using da Brianne Xi (N/A, 08/09/2022); Robot-assisted laparoscopic salpingo-oophorectomy " "(Bilateral, 08/09/2022); Left heart catheterization (Left, 02/07/2023); Occlusion of left atrial appendage (N/A, 06/12/2023); Transesophageal echocardiography (N/A, 06/12/2023); Hysterectomy; echocardiogram,transesophageal (N/A, 07/24/2023); Release of ulnar nerve at cubital tunnel (Bilateral); and Carpectomy (Left, 9/6/2023).    Medications:   has a current medication list which includes the following prescription(s): acetaminophen, albuterol, alprazolam, aspirin, atorvastatin, b complex vitamins, clopidogrel, colchicine (gout), duloxetine, furosemide, hidhwyio-jxvi-kyt9-c-radha-bosw, hydrocodone-acetaminophen, ibuprofen, krill/om-3/dha/epa/phospho/ast, lisinopril, metoprolol succinate, multivitamin, nifedipine, omeprazole, potassium chloride sa, prednisone, senna, mounjaro, tramadol, trazodone, ultra-light rollator, [DISCONTINUED] medroxyprogesterone, and [DISCONTINUED] pantoprazole, and the following Facility-Administered Medications: mupirocin.    Allergies:   Review of patient's allergies indicates:   Allergen Reactions    Flecainide Shortness Of Breath and Swelling    Shellfish containing products Other (See Comments)     Makes gout terrible    Vancomycin analogues Hives, Itching and Rash          OBJECTIVE     Observation/Appearance: Significant edema present payton at the wrist. Only mild visible at ths fingers. Dried skin and scabbing present." Discolartion present.. mild diffuse redness and cyanosis present and noted to fluctuate dependent upon hand position.    Edema. Measured in centimeters.    10/20/2023 10/20/2023    Left Right   2in. Above elbow     2in. Below elbow     Wrist Crease 20.7 16.7   Figure of 8     MCPs 20.1 19.5       Elbow and Wrist ROM. Measured in degrees.   10/20/2023 10/20/2023    left    Elbow Ext/Flex     Supination/Pronation     Wrist Ext/Flex 30/42    Wrist RD/UD 0/20      Hand ROM. Measured in degrees.    10/20/2023 10/20/2023        Fingers 3/4 fist with effort. Full extension with " obvious OA changes         Thumb: MP 55                 IP 28        Rad ADD/ABD 30        Pal ADD/ABD 40           Opposition Mid phalanx SF       Strength (Dynamometer) and Pinch Strength (Pinch Gauge)  Measured in pounds.   Contraindicated at present         Rung II     [place] Pinch     3pt Pinch     2pt Pinch       Sensation: Vicky deficits other than mild decrease in sensation at sx site.        Limitation/Restriction for FOTO wrist Survey    Therapist reviewed FOTO scores for Vicky Alvarado on 10/20/2023.   FOTO documents entered into [employer] - see Media section.    Limitation Score: 48%         Treatment   Total Treatment time (time-based codes) separate from Evaluation: 30 minutes    Vicky received the treatments listed below:     Supervised modalities after being cleared for contradictions: Hot Pack - x 10 minutes with hand elevated over wedge      Manual therapy techniques: retrograde massage over glove to educate in technique were to the: left hand and wrist for 2 minutes    Therapeutic exercises to develop ROM for 18 minutes, including:  Exercise Reps/Time       Gentle Wrist AROM: e/f, RD/UD x 10 ea   Gentle TGE: Wave, Straight Fist, Hook, Fists, Spreads X 10 ea   Thumb AROM:Pinky Slides X 10   Education HEP    Overhead pumping/ squeezing yellow sponge. Issued for home use                       Patient Education and Home Exercises      Education provided:   - Role of OT, HEP, and POC    Written Home Exercises Provided: yes.  Exercises were reviewed and Vicky was able to demonstrate them prior to the end of the session.  Vicky demonstrated good  understanding of the education provided. See EMR under Patient Instructions for exercises provided during therapy sessions.     Pt was advised to perform these exercises free of pain, and to stop performing them if pain occurs.    Patient/Family Education: role of OT, goals for OT, scheduling/cancellations - pt verbalized understanding. Discussed  insurance limitations with patient.    ASSESSMENT     Vicky Alvarado is a 58 y.o. female referred to outpatient occupational therapy and presents with a medical diagnosis of Left wrist pain [M25.532]  Kienböck's disease, left [M92.212] and is status post left wrist proximal carpectomy with graft jacket and PIN neurectomy by Dr. Hall on 9/6/23 .  Patient presents with the following therapy deficits: Decreased ROM, Decreased  strength, Decreased pinch strength, Decreased muscle strength, Decreased functional hand use, Increased pain, Edema, Joint Stiffness, and Scar Adhesions and demonstrates limitations as described in the chart below. Following medical record review it is determined that pt will benefit from occupational therapy services in order to maximize pain free and/or functional use of left wrist. The following goals were discussed with the patient and patient is in agreement with them as to be addressed in the treatment plan. The patient's rehab potential is Good.     Anticipated barriers to occupational therapy: Duration of condition  Pt has no cultural, educational or language barriers to learning provided.    Profile and History Assessment of Occupational Performance Level of Clinical Decision Making Complexity Score   Occupational Profile:   Vicky Alvarado is a 58 y.o. female who lives with an adult  and is  no working at present  Vicky Alvarado has difficulty with  ADLs and IADLs as listed previously, which  Affecting herdaily functional abilities.      Comorbidities:    has a past medical history of Anticoagulant long-term use, Arthritis, Atrial fibrillation, Atrial fibrillation with RVR, Avascular necrosis, CHF (congestive heart failure), Chronic fatigue, Depression, Diabetes mellitus, Encounter for blood transfusion, Encounter for blood transfusion, Fatty liver, GERD (gastroesophageal reflux disease), psychiatric care, Hypertension, Iron deficiency anemia,  Liver disease, Obese, Pre-diabetes, Psychiatric problem, Sleep apnea, SOB (shortness of breath) on exertion, and Weight loss.    Medical and Therapy History Review:   Brief               Performance Deficits    Physical:  Joint Mobility  Muscle Power/Strength  Muscle Endurance  Skin Integrity/Scar Formation  Edema   Strength  Pinch Strength  Gross Motor Coordination  Fine Motor Coordination    Cognitive:  No Deficits    Psychosocial:    No Deficits     Clinical Decision Making:  low    Assessment Process:  Problem-Focused Assessments    Modification/Need for Assistance:  Not Necessary    Intervention Selection:  Multiple Treatment Options       low  Based on PMHX, co morbidities , data from assessments and functional level of assistance required with task and clinical presentation directly impacting function.         Goals:   The following goals were discussed with the patient and patient is in agreement with them as to be addressed in the treatment plan.     Long Term Goals (LTGs); to be met by discharge.  LTG #1: Pt will report a pain level of 2 out of 10 with ADL's.   LTG #2: Pt will demo improved FOTO score by 20 points.   LTG #3: Pt will return to prior level of function for ADLs and household management.     Short Term Goals (STGs); to be met within 6 weeks (12/1/2023).  STG #1: Pt will report 4 out of 10 pain level with ADL's.  STG #2: Pt will report/demo independence in managing clothes fasteners.  STG #3: Pt will reports ability to use the left hand for light hygiene.  STG #4: Pt will demo an increase of wrist extension and wrist flexion by 20 degrees each.  STG #5: Pt will demo full ROM of all digits          PLAN   Plan of Care Certification: 10/20/2023 to 1/12/2024.     Outpatient Occupational Therapy 2 times weekly for 12 weeks to include the following interventions: Paraffin, Fluidotherapy, Manual therapy/joint mobilizations, Modalities for pain management, US 3 mhz, Therapeutic  exercises/activities., Iontophoresis with 2.0 cc Dexamethasone, Strengthening, Orthotic Fabrication/Fit/Training, Edema Control, and Scar Management.      SOCORRO [place], T        I CERTIFY THE NEED FOR THESE SERVICES FURNISHED UNDER THIS PLAN OF TREATMENT AND WHILE UNDER MY CARE  Physician's comments:      Physician's Signature: ___________________________________________________

## 2023-10-23 NOTE — PROGRESS NOTES
"  [facility]  Occupational Therapy Treatment Note    Date: 10/24/2023  Name: Vicky Alvarado  Clinic Number: [id number]    Therapy Diagnosis:   Encounter Diagnoses   Name Primary?    Left wrist pain     Stiffness of left wrist joint     Swelling of left wrist      Physician: Echo Aguirre PA-C    Physician Orders: 2x/wk x 8-12 weeks   Medical Diagnosis: Left wrist pain [M25.532]  Kienböck's disease, left [M92.212]     Surgical Procedure and Date OR (Conservative Treatment) Date of Injury/Onset: status post left wrist proximal carpectomy with graft jacket and PIN neurectomy by Dr. Hall on 9/6/23      Evaluation Date: 10/20/2023  Insurance Authorization Period Expiration: 9/19/2023  Plan of Care Expiration: 1/12/2024  Date of Return to MD: Early December  Visit # / Visits authorized: 1 / 1  FOTO:   48% Function on 10/20/2023    Precautions:  Standard, Diabetes, and Weightbearing, fall risk    Time In: 2:30 pm  Time Out: 3:20 pm  Total Billable Time: 50  minutes      SUBJECTIVE   Date of Onset/ History of Current Condition/Mechanism of Injury: Vicky reports: "long as I can remember."     Falls: Feb 2023      Dominant Side: Right  Involved Side: left  Pt reports: "It's better."   She was compliant with home exercise program given last session.   Response to previous treatment:increase in ROM and decrease in pain and edema  Functional change: NA. Post-op precautions    Pain: 3/10  Location: left wrist and hand    OBJECTIVE   Objective Measures updated at progress report unless specified.    Observation/Appearance: Significant edema present payton at the wrist. Only mild visible at ths fingers. Dried skin and scabbing present." Discolartion present.. mild diffuse redness and cyanosis present and noted to fluctuate dependent upon hand position.     Edema. Measured in centimeters.     10/20/2023 10/20/2023     Left Right   2in. Above elbow       2in. Below elbow       Wrist Crease 20.7 " 16.7   Figure of 8       MCPs 20.1 19.5         Elbow and Wrist ROM. Measured in degrees.    10/20/2023 10/20/2023     left     Elbow Ext/Flex       Supination/Pronation       Wrist Ext/Flex 30/42     Wrist RD/UD 0/20        Hand ROM. Measured in degrees.     10/20/2023 10/20/2023           Fingers 3/4 fist with effort. Full extension with obvious OA changes             Thumb: MP 55                  IP 28         Rad ADD/ABD 30         Pal ADD/ABD 40            Opposition Mid phalanx SF         Strength (Dynamometer) and Pinch Strength (Pinch Gauge)  Measured in pounds.    Contraindicated at present             Rung II       De Leon Pinch       3pt Pinch       2pt Pinch          Sensation: Vicky denies deficits other than mild decrease in sensation at sx site.           Limitation/Restriction for FOTO wrist Survey     Therapist reviewed FOTO scores for Vicky Alvarado on 10/20/2023.   FOTO documents entered into [employer] - see Media section.     Limitation Score: 48%            Treatment     Vicky received the treatments listed below:     Supervised modalities after being cleared for contradictions: Hot Pack - x 10 min Will use paraffin next session. Today mild scratches appear to nearly fully healed. Pt does not like fluido.    Direct contact modalities after being cleared for contraindications: NT    Manual therapy techniques: 10 min.   Manual Lymphatic Drainage  and the following:   Gentle PROM: hook, straight fist, full fist     Therapeutic exercises to develop ROM for 30 minutes, including:  Exercise Reps/Time         Gentle Wrist AROM: e/f, RD/UD x 20 reps for flex/extension and 10 for r/ud   Gentle A/AAROM: Wave, Straight Fist, Hook, Fists, Spreads X 10 ea   Thumb AROM:Pinky Slides X 10   Wrist arom: flex/ext  With weighted wheel x 2 min   Fa arom sup/pro With weighted wheel x 2 min     Continue overhead pumping/ squeezing yellow sponge. Issued for home use        isospheres for digital arom 2 min      in-hand for digital arom Pompom 2 min                  Therapeutic activities to improve functional performance NT    Neuromuscular re-education activities (NT)  Patient Education and Home Exercises      Education provided:   -   - Progress towards goals     Written Home Exercises Provided: Patient instructed to cont prior HEP.  Exercises were reviewed and Vicky was able to demonstrate them prior to the end of the session.  Vicky demonstrated good  understanding of the HEP provided. See EMR under Patient Instructions for exercises provided during therapy sessions.      ASSESSMENT      Vicky reports decrease in pain and edema and an increase on arom already. She fit in a medium compression glove today. She required a large upon initial evaluation She tolerated progression of  therapeutic exercises with report of increase in pain and fatigue as expected at this time. She will be progressed slowly as tolerated and appropriate.    Vicky is progressing well towards her goals and there are no updates to goals at this time. Pt prognosis is Good.     Pt will continue to benefit from skilled outpatient occupational therapy to address the deficits listed in the problem list on initial evaluation, provide pt/family education and to maximize pt's level of independence in the home and community environment.     Pt's spiritual, cultural and educational needs considered and pt agreeable to plan of care and goals.    Anticipated barriers to occupational therapy: Duration of condition    Goals:  The following goals were discussed with the patient and patient is in agreement with them as to be addressed in the treatment plan.      Long Term Goals (LTGs); to be met by discharge.  LTG #1: Pt will report a pain level of 2 out of 10 with ADL's.        LTG #2: Pt will demo improved FOTO score by 20 points.   LTG #3: Pt will return to prior level of function for ADLs and household management.      Short Term Goals (STGs); to be met  within 6 weeks (12/1/2023).  STG #1: Pt will report 4 out of 10 pain level with ADL's.  STG #2: Pt will report/demo independence in managing clothes fasteners.  STG #3: Pt will reports ability to use the left hand for light hygiene.  STG #4: Pt will demo an increase of wrist extension and wrist flexion by 20 degrees each.  STG #5: Pt will demo full ROM of all digits    PLAN     Continue skilled occupational therapy with individualized plan of care focusing on slow progression of treatment as tolerated and appropriate.  Elena Herrmann, OT

## 2023-10-24 ENCOUNTER — CLINICAL SUPPORT (OUTPATIENT)
Dept: REHABILITATION | Facility: HOSPITAL | Age: 58
End: 2023-10-24
Payer: COMMERCIAL

## 2023-10-24 DIAGNOSIS — M25.432 SWELLING OF LEFT WRIST: ICD-10-CM

## 2023-10-24 DIAGNOSIS — M25.532 LEFT WRIST PAIN: ICD-10-CM

## 2023-10-24 DIAGNOSIS — M25.632 STIFFNESS OF LEFT WRIST JOINT: ICD-10-CM

## 2023-10-24 PROCEDURE — 97110 THERAPEUTIC EXERCISES: CPT

## 2023-10-24 PROCEDURE — 97140 MANUAL THERAPY 1/> REGIONS: CPT

## 2023-10-25 PROBLEM — M25.532 LEFT WRIST PAIN: Status: ACTIVE | Noted: 2023-10-25

## 2023-10-25 PROBLEM — M25.632 STIFFNESS OF LEFT WRIST JOINT: Status: ACTIVE | Noted: 2023-10-25

## 2023-10-25 PROBLEM — M25.432 SWELLING OF LEFT WRIST: Status: ACTIVE | Noted: 2023-10-25

## 2023-10-31 ENCOUNTER — CLINICAL SUPPORT (OUTPATIENT)
Dept: REHABILITATION | Facility: HOSPITAL | Age: 58
End: 2023-10-31
Payer: COMMERCIAL

## 2023-10-31 DIAGNOSIS — M25.432 SWELLING OF LEFT WRIST: ICD-10-CM

## 2023-10-31 DIAGNOSIS — M25.632 STIFFNESS OF LEFT WRIST JOINT: ICD-10-CM

## 2023-10-31 DIAGNOSIS — M25.532 LEFT WRIST PAIN: Primary | ICD-10-CM

## 2023-10-31 PROCEDURE — 97022 WHIRLPOOL THERAPY: CPT

## 2023-10-31 PROCEDURE — 97110 THERAPEUTIC EXERCISES: CPT

## 2023-10-31 PROCEDURE — 97140 MANUAL THERAPY 1/> REGIONS: CPT

## 2023-10-31 NOTE — PROGRESS NOTES
"  [facility]  Occupational Therapy Treatment Note    Date: 10/31/2023  Name: Vicky Alvarado  Clinic Number: [id number]    Therapy Diagnosis:   Encounter Diagnoses   Name Primary?    Left wrist pain Yes    Stiffness of left wrist joint     Swelling of left wrist      Physician: Echo Aguirre PA-C    Physician Orders: 2x/wk x 8-12 weeks   Medical Diagnosis: Left wrist pain [M25.532]  Kienböck's disease, left [M92.212]     Surgical Procedure and Date OR (Conservative Treatment) Date of Injury/Onset: status post left wrist proximal carpectomy with graft jacket and PIN neurectomy by Dr. Hall on 9/6/23      Evaluation Date: 10/20/2023  Insurance Authorization Period Expiration: 9/19/2023  Plan of Care Expiration: 1/12/2024  Date of Return to MD: Early December  Visit # / Visits authorized: 1 / 1  FOTO:   48% Function on 10/20/2023    Precautions:  Standard, Diabetes, and Weightbearing, fall risk    Time In: 9:30 am  Time Out: 10:30 am  Total Billable Time: 55  minutes      SUBJECTIVE   Date of Onset/ History of Current Condition/Mechanism of Injury: Vicky reports: "long as I can remember."     Falls: Feb 2023      Dominant Side: Right  Involved Side: left  Pt reports: "It's better."   She was compliant with home exercise program given last session.   Response to previous treatment:increase in ROM and decrease in pain and edema  Functional change: NA. Post-op precautions    Pain: 3/10  Location: left wrist and hand    OBJECTIVE   Objective Measures updated at progress report unless specified.    Observation/Appearance: Edema visibly decreased. Localized edema remains at the wrist. All cyanosis and fluctuations in color of the fingers has resolved..     Edema. Measured in centimeters.     10/20/2023 10/20/2023     Left Right   2in. Above elbow       2in. Below elbow       Wrist Crease 20.7 16.7   Figure of 8       MCPs 20.1 19.5         Elbow and Wrist ROM. Measured in degrees.    " 10/20/2023 10/20/2023     left     Elbow Ext/Flex       Supination/Pronation       Wrist Ext/Flex 30/42     Wrist RD/UD 0/20        Hand ROM. Measured in degrees.     10/20/2023 10/20/2023           Fingers 3/4 fist with effort. Full extension with obvious OA changes             Thumb: MP 55                  IP 28         Rad ADD/ABD 30         Pal ADD/ABD 40            Opposition Mid phalanx SF         Strength (Dynamometer) and Pinch Strength (Pinch Gauge)  Measured in pounds.    Contraindicated at present             Rung II       De Leon Pinch       3pt Pinch       2pt Pinch          Sensation: Vicky denies deficits other than mild decrease in sensation at sx site.           Limitation/Restriction for FOTO wrist Survey     Therapist reviewed FOTO scores for Vicky Alvarado on 10/20/2023.   FOTO documents entered into [employer] - see Media section.     Limitation Score: 48%            Treatment     Vicky received the treatments listed below:     Supervised modalities after being cleared for contradictions: Paraffin with Hot Pack - x 10 min to decrease pain and increase soft tissue extensibility . Pt does not like fluido.    Direct contact modalities after being cleared for contraindications: NT    Manual therapy techniques: 10 min.   Manual Lymphatic Drainage  and the following:   Gentle PROM: MP flex, hook, straight fist, full fist     Therapeutic exercises to develop ROM for 35 minutes, including:  Exercise Reps/Time         Gentle Wrist AROM: e/f, RD/UD x 20 reps for flex/extension and 10 for r/ud   Gentle A/AAROM: Wave, Straight Fist, Hook, Fists, Spreads X 10 ea   Thumb AROM:Pinky Slides X 10   Wrist arom: flex/ext  With weighted wheel x 3 min   Fa arom sup/pro With weighted wheel x 3 min     Continue overhead pumping/ squeezing yellow sponge. Issued for home use   Gyro wheel for wrist arom X 2min    isospheres for digital arom 3 min     in-hand manipulation digital arom Pompom 3 min    Wrist maze for  wrist arom 3 min    Therapeutic activities to improve functional performance NT (Attempted Josiah-Balance - Pt was unable to hold it due to weakness and pain)    Neuromuscular re-education activities (NT)  Patient Education and Home Exercises      Education provided:   -   - Progress towards goals     Written Home Exercises Provided: Patient instructed to cont prior HEP.  Exercises were reviewed and Vicky was able to demonstrate them prior to the end of the session.  Vicky demonstrated good  understanding of the HEP provided. See EMR under Patient Instructions for exercises provided during therapy sessions.      ASSESSMENT      Vicky  continues to report decrease in pain and edema and an increase on arom.  She required a large upon initial evaluation.  Continue to progress slowly as tolerated and appropriate.    Vicky is progressing well towards her goals and there are no updates to goals at this time. Pt prognosis is Good.     Pt will continue to benefit from skilled outpatient occupational therapy to address the deficits listed in the problem list on initial evaluation, provide pt/family education and to maximize pt's level of independence in the home and community environment.     Pt's spiritual, cultural and educational needs considered and pt agreeable to plan of care and goals.    Anticipated barriers to occupational therapy: Duration of condition    Goals:  The following goals were discussed with the patient and patient is in agreement with them as to be addressed in the treatment plan.      Long Term Goals (LTGs); to be met by discharge.  LTG #1: Pt will report a pain level of 2 out of 10 with ADL's.        LTG #2: Pt will demo improved FOTO score by 20 points.   LTG #3: Pt will return to prior level of function for ADLs and household management.      Short Term Goals (STGs); to be met within 6 weeks (12/1/2023).  STG #1: Pt will report 4 out of 10 pain level with ADL's.  STG #2: Pt will report/demo  independence in managing clothes fasteners.  STG #3: Pt will reports ability to use the left hand for light hygiene.  STG #4: Pt will demo an increase of wrist extension and wrist flexion by 20 degrees each.  STG #5: Pt will demo full ROM of all digits    PLAN     Continue skilled occupational therapy with individualized plan of care focusing on slow progression of treatment as tolerated and appropriate.  Elena Herrmann, OT

## 2023-11-07 ENCOUNTER — DOCUMENTATION ONLY (OUTPATIENT)
Dept: REHABILITATION | Facility: HOSPITAL | Age: 58
End: 2023-11-07

## 2023-11-07 ENCOUNTER — PATIENT MESSAGE (OUTPATIENT)
Dept: REHABILITATION | Facility: HOSPITAL | Age: 58
End: 2023-11-07

## 2023-11-07 NOTE — PROGRESS NOTES
Comprehensive Disease Management Note    Primary Care Physician  Sabas Adams MD  Office Phone #: [phone]  Fax Phone #: [phone]    Chief Complaint   Patient presents with   • Care Gap - Chronic Disease     Chronic Disease Management     HPI  Magaly is a 79 year old female seen for comprehensive chronic disease management related to 1. Type 2 diabetes mellitus with stage 3a chronic kidney disease, without long-term current use of insulin (CMD)  2. SSS (sick sinus syndrome) (CMD)  3. Overweight (BMI 25.0-29.9)  4. Healthcare maintenance  History Obtained By History Obtained by: Patient and Chart Review  Patient's medications, allergies, past medical, surgical, social, and family histories were reviewed and updated as appropriate    Review of Systems   Constitutional: Negative.  Negative for activity change and appetite change.   HENT: Negative.    Eyes: Negative.    Respiratory: Negative.    Cardiovascular: Negative.  Negative for chest pain, palpitations and leg swelling.   Gastrointestinal: Negative.  Negative for blood in stool, constipation and diarrhea.   Endocrine: Negative.    Genitourinary: Negative.    Musculoskeletal: Negative.  Negative for arthralgias.   Skin: Negative.    Allergic/Immunologic: Negative.    Neurological: Negative.  Negative for syncope and headaches.   Hematological: Negative.    Psychiatric/Behavioral: Negative.    All other systems reviewed and are negative.      Current Outpatient Medications   Medication Sig Dispense Refill   • hydrALAZINE (APRESOLINE) 50 MG tablet TAKE 1 TABLET BY MOUTH IN THE MORNING AND AT NOON AND IN THE EVENING 270 tablet 0   • ketorolac (ACULAR) 0.5 % ophthalmic solution Place 1 drop into both eyes daily.     • dorzolamide-timolol (COSOPT) 2-0.5 % ophthalmic solution      • metFORMIN (GLUCOPHAGE) 500 MG tablet TAKE 1 TABLET BY MOUTH THREE TIMES DAILY 90 tablet 0   • FeroSul 325 (65 Fe) MG tablet TAKE 1 TABLET BY MOUTH DAILY WITH BREAKFAST 30 tablet 2  Discussed with Dr. Hamilton.  Plan for return to OR for VAC change, possible flap coverage on Thursday 8/6/20.    Uriel Conner MD PGY-7  Vascular and Endovascular Surgery Fellow  08/03/2020         • acarbose (PRECOSE) 25 MG tablet TAKE 1 TABLET BY MOUTH THREE TIMES DAILY WITH MEALS 270 tablet 0   • Blood Glucose Monitoring Suppl (True Metrix Air Glucose Meter) w/Device Kit USE AS DIRECTED 1 kit 0   • benazepril-hydrochlorthiazide (LOTENSIN HCT) 20-25 MG per tablet TAKE 1 1/2 TABLETS BY MOUTH DAILY 135 tablet 1   • carvedilol (COREG) 25 MG tablet TAKE 1 TABLET BY MOUTH TWICE DAILY IN THE MORNING AND IN THE EVENING WITH A MEAL 180 tablet 0   • atorvastatin (LIPITOR) 20 MG tablet TAKE 1 TABLET EVERY DAY 90 tablet 0   • blood glucose test strip TEST BLOOD SUGAR ONE TIME DAILY 100 strip 3   • TRUEplus Lancets 30G Misc TEST BLOOD SUGAR ONE TIME DAILY 100 each 3   • Alcohol Swabs Pads TEST BLOOD SUGAR ONE TIME DAILY 100 each 3   • Blood Glucose Calibration (True Metrix Level 1) Low Solution 1 Piece by In Vitro route as needed (use as needed). 1 each 1   • acetaZOLAMIDE (DIAMOX SEQUELS) 500 MG extended release (12 HR) capsule Take 500 mg by mouth daily.     • dorzolamide (TRUSOPT) 2 % ophthalmic solution INSTILL 1 DROP IN BOTH EYES TWICE DAILY 10 mL 0   • prednisoLONE acetate (PRED FORTE) 1 % ophthalmic suspension Place 1 drop into left eye in the morning and 1 drop in the evening.     • aspirin 81 MG EC tablet Take 1 tablet by mouth 2 times daily for 19 days. Take 1 tab by mouth every 12 hours for 3 weeks; Start aspirin 1 day after discontinuation of eliquis; Take aspirin with food (Patient taking differently: Take 81 mg by mouth daily.) 38 tablet 0   • brimonidine (ALPHAGAN) 0.2 % ophthalmic solution Place 1 drop into both eyes 2 times daily. 1 each 0   • latanoprost (XALATAN) 0.005 % ophthalmic solution Place 1 drop into both eyes nightly. 2.5 mL 0     No current facility-administered medications for this visit.       ALLERGIES:   Allergen Reactions   • Penicillin G RASH and PRURITUS       Past Medical History:   Diagnosis Date   • Acute blood loss anemia 10/21/2021   • Acute pain of left knee 10/26/2023   •  Adjustment and management of cardiac pacemaker/ DR Arekat 2/10/2020   • Allergic rhinitis    • Ataxia 8/1/2012   • Bronchitis, acute, with bronchospasm 10/26/2023   • Cardiac arrhythmia 10/7/2011   • Controlled type 2 diabetes mellitus with complication, without long-term current use of insulin (CMD) 8/25/2014   • COVID-19 virus infection 7/29/2022   • Diabetes mellitus (CMD)    • Elevated troponin 7/29/2022   • Essential (primary) hypertension    • Essential familial hypercholesterolemia 7/8/2013   • Glaucoma    • High cholesterol    • Hyperlipidemia 7/29/2022   • Knee pain bilateral   • Low back pain    • Neuropathy 10/7/2011   • NSTEMI (non-ST elevated myocardial infarction) (CMD) 7/29/2022   • Osteoarthritis    • Pre-syncope 5/26/2016   • Sinusitis    • Stage 3a chronic kidney disease (CMD) 7/29/2022       Past Surgical History:   Procedure Laterality Date   • Cholecystectomy     • Pacemaker implant     • Removal gallbladder     • Total knee replacement Left        Social History     Substance and Sexual Activity   Drug Use No       Family History   Problem Relation Age of Onset   • Coronary Artery Disease Mother    • Cancer Mother    • Cancer Father        Visit Vitals  /72 (BP Location: LUE - Left upper extremity, Patient Position: Sitting, Cuff Size: Large Adult)   Pulse 74   Temp 97.5 °F (36.4 °C) (Skin)   Resp 18   Ht 5' 6\" (1.676 m)   Wt 83.9 kg (185 lb)   SpO2 98%   BMI 29.86 kg/m²          No results found for this visit on 11/03/23.      Social Determinants of Health  Social Determinants of Health     Intimate Partner Violence: Not At Risk (11/3/2023)    Intimate Partner Violence    • Social Determinants: Intimate Partner Violence Past Fear: No    • Social Determinants: Intimate Partner Violence Current Fear: No   Social Connections: Socially Integrated (11/3/2023)    Social Connections    • Social Determinants: Social Connections: 5 or more times a week   Alcohol Use: Not At Risk (11/3/2023)     Alcohol Use    • Social Determinants: Total Audit-C Score: 0   Tobacco Use: Low Risk  (11/6/2023)    Patient History    • Smoking Tobacco Use: Never    • Smokeless Tobacco Use: Never    • Passive Exposure: Not on file   Financial Resource Strain: Low Risk  (11/3/2023)    Financial Resource Strain    • Social Determinants: Financial Resource Strain: None   Stress: Low Risk  (11/3/2023)    Stress    • Social Determinants: Stress: Not at all   Physical Activity: Sufficiently Active (11/3/2023)    Exercise Vital Sign    • Days of Exercise per Week: 3 days    • Minutes of Exercise per Session: 120 min   Food Insecurity: No Food Insecurity (11/3/2023)    Food Insecurity    • Social Determinants: Food Insecurity: Never   Transportation Needs: No Transportation Needs (11/3/2023)    PRAPARE - Transportation    • Lack of Transportation (Medical): No    • Lack of Transportation (Non-Medical): No   Inadequate Housing: Low Risk  (11/3/2023)    Inadequate Housing    • Social Determinants: Housing (Overall Score [place]) : 2   Depression: Not at risk (11/6/2023)    PHQ-2    • PHQ-2 Score: 0     Functional Assessment   Independent bathing, Independent continence , Independent dressing  , Independent feeding , Independent toileting  and Independent transferring bed/chair  laundry with Independent, handles financials with Independent, housekeeping with Independent, meal prep with Independent, making bed with Independent, managing medications with Independent, shopping with Independent, telephone with Independent and transportation with Independent (drives).     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   11/6/2023  12:00 PM 0 No further screening needed 0       PHQ 9:  PHQ 9 Score Adult PHQ 9 Score Adult PHQ 9 Interpretation   3/23/2023   4:51  PM 5 Mild Depression       Advanced Illness Screen  Reviewed: Yes  Advanced Illness Screen  Has the patient been diagnosed with an advanced illness during the measurement year or year prior that met one or more of the following criteria?: No  Has the patient had two outpatient visits, observation visit, ED visit or non-acute inpatient encounter on different dates of service with an advanced illness diagnosis?: No  Has the patient had one acute inpatient encounter with an advanced illness diagnosis?: No  Has the patient been dispensed dementia medication (donepezil, galantamine, rivastigmine, memantine)?: No    Frailty Screen  Reviewed: Yes  Frailty Scale  Have you felt fatigued? Most or all of the time over the past month?: No  Do you have difficulty climbing a flight of stairs?: No  Do you have difficulty walking one block?: No  Do you have any of these illnesses: hypertension, diabetes, cancer (other than a minor skin cancer), chronic lung disease, heart attack, congestive heart failure, angina, asthma, arthritis, stroke, and kidney disease?: Yes  Have you lost more than 5 percent of your weight in the past year?: No  A score of 3 or more is considered frail: 1    Hospice/Palliative Screen  Has the patient been enrolled in Hospice or Palliative Medicine during measurement year? No    Health Maintenance   Health Maintenance Summary     Osteoporosis Screening (Once)  Ordered on 11/6/2023    Diabetes Eye Exam (Yearly)  Ordered on 10/26/2023    Shingles Vaccine (1 of 2)  Postponed until 12/30/2023    COVID-19 Vaccine (4 - 2023-24 season)  Postponed until 12/31/2023    DTaP/Tdap/Td Vaccine (1 - Tdap)  Postponed until 11/6/2025    DM/CKD GFR (Yearly)  Ordered on 11/6/2023    DM/CKD Microalbumin (Yearly)  Ordered on 11/6/2023    Diabetes A1C (Every 6 Months)  Ordered on 11/6/2023    Diabetes Foot Exam (Yearly)  Next due on 11/3/2024    Depression Screening (Yearly)  Next due on 11/6/2024    Pneumococcal Vaccine 65+    Completed    Medicare Advantage- Medicare Wellness Visit   Completed    Influenza Vaccine   Completed    Meningococcal Vaccine   Aged Out    Hepatitis B Vaccine (For Physician/APC Discussion)   Aged Out    HPV Vaccine   Aged Out      Influenza/Pneumococcal Vaccine  Recommended: Yes  Breast Cancer Screening   Recommended: No  Colorectal Cancer Screening   Recommended: No  Comprehensive Diabetes Care (CDC) (HgbA1c/Dilated Eye Exam/Nephropathy(Urine Mircoalbumin)  Recommended: No  Osteoporosis Management in Women with Fractures (OMW)   Recommended: No  Statin Therapy for Patients with CVD  Recommended: No  Statin Therapy For Persons with Diabetes  Recommended: No  Rheumatoid Arthritis (RA)  Recommended: No  BP Control  Recommended: Yes     Advance Care Planning  Advance Care Planning    [address] [place] [address]    Advance care planning documents on file - no     Advance care planning discussion offered to participants: Rosalba Caicedo, CNP, Patient consented to discussion. Time spent on discussion was 16 minutes.  Patient will discuss with her Daughter Randa, Son Kirt, and oldest son Karlene     Patient was educated on Advanced Care Planning and the importance of having a [place] Living will and HCPOA.  We discussed importance of expressing her wishes with her children so they know what care she would want in the event she is unable to make decisions for herself.        PHYSICAL ASSESSMENT  Physical Exam  Vitals reviewed.   Constitutional:       General: She is not in acute distress.     Appearance: Normal appearance. She is not ill-appearing, toxic-appearing or diaphoretic.      Comments: Ambulatory in home    HENT:      Head: Normocephalic and atraumatic.      Right Ear: External ear normal.      Left Ear: External ear normal.      Nose: Nose normal. No congestion.      Mouth/Throat:      Mouth: Mucous membranes are moist.      Pharynx: No posterior oropharyngeal erythema.      Neck: Normal range of motion.    Eyes:      General: No scleral icterus.     Conjunctiva/sclera: Conjunctivae normal.   Cardiovascular:      Rate and Rhythm: Normal rate and regular rhythm.      Pulses: Normal pulses.      Heart sounds: Normal heart sounds. No murmur heard.     No friction rub.   Pulmonary:      Effort: Pulmonary effort is normal.      Breath sounds: Normal breath sounds. No rhonchi or rales.   Abdominal:      General: There is no distension.      Palpations: Abdomen is soft.   Musculoskeletal:         General: Normal range of motion.      Right lower leg: No edema.      Left lower leg: No edema.   Skin:     General: Skin is warm and dry.      Capillary Refill: Capillary refill takes less than 2 seconds.   Neurological:      General: No focal deficit present.      Mental Status: She is alert and oriented to person, place, and time.      Motor: No weakness.   Psychiatric:         Mood and Affect: Mood normal.         Behavior: Behavior normal.         Thought Content: Thought content normal.         Judgment: Judgment normal.         ASSESSMENT/PLAN  Assessment   Type 2 diabetes mellitus with stage 3a chronic kidney disease, without long-term current use of insulin (CMD)  Monitor: The patient's diabetes is stable.  Evaluation: Reviewed recent labs/diagnostic tests with the patient. POC A1c 7.3  Assessment/Treatment:  Continue current treatment/monitoring regimen.  Reminded to bring in blood sugar diary at next visit.  Dietary recommendations for ADA diet.  Regular aerobic exercise.  Discussed foot care.  Reminded to get yearly retinal exam.  Medication changes per orders.  Ophthalmology referral.  Condition will be reassessed per progress note    Overweight (BMI 25.0-29.9)  Discussed the patient's BMI.  The BMI is above average. The patient received dietary education, feeding regime and exercise education because they have an above normal BMI..  Diet interventions: moderate (500 kCal/d) deficit diet.  Regular aerobic exercise  program discussed.   Condition will be reassessed per progress note      SSS (sick sinus syndrome) (CMD)  Pacemaker   Stable asymptomatic   Managed by Cardiology     Healthcare maintenance  Due for Shingles vaccine  declines at this time   RetinaVue- unable to complete due to dark image on Right eye - will need referral to Ophthalmology    Diabetes A1C- due 8/4/2023    DM/CKD GFR- CMP due 2/4/2024   DM/CKD Micro Albumin- Urine for Microalbumin Urine Random due 3/23/2024   Follow with PCP as planned for MWV   And cardiology                         Controlling HTN: Is the BP identified as controlled, the systolic and diastolic BP must be lower than 140/90 mm HG?  Yes      Total time spent is 90 minutes.  Time spent in:  - Discussion of plan of care with the patient/family/staff regarding:     Prognosis, differential diagnosis, risks, and benefits of treatment(s), instructions,     compliance, and risk reduction  - Preparing to see the patient (eg, review of tests)Reviewed 3 other providers notes to prepare for visit   - Performing a medically appropriate examination and/or evaluation  - Counseling and educating the patient/family/caregiver  - Ordering medications, tests, or procedures  - Referring and communicating with other health care professionals (when not separately reported)  - Documenting clinical information in the electronic or other health record  - Care coordination (not separately reported)        Rosalba Caicedo, CNP

## 2023-11-07 NOTE — PROGRESS NOTES
Vicky message me today, stating the she got her appointment time mixed for today. Stated that she will attend her Friday appointment.

## 2023-11-09 ENCOUNTER — PROCEDURE VISIT (OUTPATIENT)
Dept: PAIN MEDICINE | Facility: CLINIC | Age: 58
End: 2023-11-09
Attending: ANESTHESIOLOGY
Payer: COMMERCIAL

## 2023-11-09 VITALS
TEMPERATURE: 98 F | BODY MASS INDEX: 45.5 KG/M2 | WEIGHT: 256.81 LBS | RESPIRATION RATE: 18 BRPM | HEART RATE: 89 BPM | DIASTOLIC BLOOD PRESSURE: 72 MMHG | OXYGEN SATURATION: 98 % | SYSTOLIC BLOOD PRESSURE: 113 MMHG

## 2023-11-09 DIAGNOSIS — M17.0 BILATERAL PRIMARY OSTEOARTHRITIS OF KNEE: Primary | ICD-10-CM

## 2023-11-09 PROCEDURE — 20611 PR DRAIN/ASP/INJECT MAJOR JOINT/BURSA W/US GUIDANCE: ICD-10-PCS | Mod: 50,S$GLB,, | Performed by: ANESTHESIOLOGY

## 2023-11-09 PROCEDURE — 20611 DRAIN/INJ JOINT/BURSA W/US: CPT | Mod: 50,S$GLB,, | Performed by: ANESTHESIOLOGY

## 2023-11-09 NOTE — PROCEDURES
Large Joint Aspiration/Injection    Date/Time: 11/9/2023 2:30 PM    Performed by: Schuyler Quezada MD  Authorized by: Fabian Degroot MD    Consent Done?:  Yes (Verbal)  Indications:  Arthritis  Site marked: the procedure site was marked    Timeout: prior to procedure the correct patient, procedure, and site was verified      Knee  Injection Ultrasound guidance  Time-out taken to identify patient and procedure side prior to starting the procedure.   I attest that I have reviewed the patient's home medications prior to the procedure and no contraindication have been identified. I  re-evaluated the patient after the patient was positioned for the procedure in the procedure room immediately before the procedural time-out. The vital signs are current and represent the current state of the patient which has not significantly changed since the preprocedure assessment.                   Date of Service: 11/09/2023    PCP: Gordy Cordero MD    Referring Physician:                                                                                                                                   PROCEDURE:  bilateral knee injection under ultrasound guidance                                                                                             REASON FOR PROCEDURE:bilateral  knee * No surgery found *  1. Bilateral primary osteoarthritis of knee            POSTOP DIAGNOSIS: bilateral  knee * No surgery found *     1. Bilateral primary osteoarthritis of knee      PHYSICIAN: Fabian Degroot MD  ASSISTANTS: Schuyler Quezada MD                                                                                LOCAL ANESTHESIA:  Xylocaine 1% 2 ml plus 2ml Bupivicaine 0.25% per side.                                                                                              MEDICATION INJECTED:   6mL of synvisc  (per side)  SEDATION MEDICATIONS: None                                                                                COMPLICATIONS:  None.                                                                                                                                     ESTIMATED BLOOD LOSS:  None.                                                                                                                              TECHNIQUE:  With the patient laying supine and the knees semi-flexed on a    wedge, the appropriate knee was prepped and draped in the usual sterile fashion    using ChloraPrep and a fenestrated drape.  Knee joint line determined under    Ultrasound guidance.  The local anesthetic was given using a 27-gauge      needle.  The 1.5in 22-gauge needle was introduced into the joint space. Medication was then    injected.  The patient tolerated the procedure well.                                                                                                                                                                                                                                                                           The patient was given post procedure discharge instructions and follow-up  instructions. The patient was discharged in a stable condition.

## 2023-11-09 NOTE — PROCEDURES
Procedure Orders   Large Joint Aspiration/Injection [[id number]] ordered by Schuyler Quezada MD     Post-procedure Diagnoses   Bilateral primary osteoarthritis of knee [M17.0]          Large Joint Aspiration/Injection     Date/Time: 11/9/2023 2:30 PM     Performed by: Schuyler Quezada MD  Authorized by: Fabian Degroot MD    Consent Done?:  Yes (Verbal)  Indications:  Arthritis  Site marked: the procedure site was marked    Timeout: prior to procedure the correct patient, procedure, and site was verified       Knee  Injection Ultrasound guidance  Time-out taken to identify patient and procedure side prior to starting the procedure.   I attest that I have reviewed the patient's home medications prior to the procedure and no contraindication have been identified. I  re-evaluated the patient after the patient was positioned for the procedure in the procedure room immediately before the procedural time-out. The vital signs are current and represent the current state of the patient which has not significantly changed since the preprocedure assessment.                    Date of Service: 11/09/2023     PCP: Gordy Cordero MD     Referring Physician:                                                                                                                                   PROCEDURE:  bilateral knee injection under ultrasound guidance                                                                                             REASON FOR PROCEDURE:bilateral  knee * No surgery found *  1. Bilateral primary osteoarthritis of knee            POSTOP DIAGNOSIS: bilateral  knee * No surgery found *     1. Bilateral primary osteoarthritis of knee       PHYSICIAN: Fabian Degroot MD  ASSISTANTS: Schuyler Quezada MD                                                                                LOCAL ANESTHESIA:  Xylocaine 1% 2 ml plus 2ml Bupivicaine 0.25% per side.                                                                                               MEDICATION INJECTED:   6mL of synvisc  (per side)  SEDATION MEDICATIONS: None                                                                                COMPLICATIONS:  None.                                                                                                                                     ESTIMATED BLOOD LOSS:  None.                                                                                                                              TECHNIQUE:  With the patient laying supine and the knees semi-flexed on a    wedge, the appropriate knee was prepped and draped in the usual sterile fashion    using ChloraPrep and a fenestrated drape.  Knee joint line determined under    Ultrasound guidance.  The local anesthetic was given using a 27-gauge      needle.  The 1.5in 22-gauge needle was introduced into the joint space. Medication was then    injected.  The patient tolerated the procedure well.                                                                                                                                                                                                                                                                           The patient was given post procedure discharge instructions and follow-up  instructions. The patient was discharged in a stable condition.

## 2023-11-10 ENCOUNTER — PATIENT MESSAGE (OUTPATIENT)
Dept: REHABILITATION | Facility: HOSPITAL | Age: 58
End: 2023-11-10
Payer: COMMERCIAL

## 2023-11-14 ENCOUNTER — CLINICAL SUPPORT (OUTPATIENT)
Dept: REHABILITATION | Facility: HOSPITAL | Age: 58
End: 2023-11-14
Payer: COMMERCIAL

## 2023-11-14 DIAGNOSIS — M25.532 LEFT WRIST PAIN: Primary | ICD-10-CM

## 2023-11-14 DIAGNOSIS — M25.432 SWELLING OF LEFT WRIST: ICD-10-CM

## 2023-11-14 DIAGNOSIS — M25.632 STIFFNESS OF LEFT WRIST JOINT: ICD-10-CM

## 2023-11-14 PROCEDURE — 97140 MANUAL THERAPY 1/> REGIONS: CPT

## 2023-11-14 PROCEDURE — 97530 THERAPEUTIC ACTIVITIES: CPT

## 2023-11-14 PROCEDURE — 97018 PARAFFIN BATH THERAPY: CPT

## 2023-11-14 PROCEDURE — 97110 THERAPEUTIC EXERCISES: CPT

## 2023-11-14 NOTE — PROGRESS NOTES
"  [facility]  Occupational Therapy Treatment Note    Date: 11/14/2023  Name: Vicky Alvarado  Clinic Number: [id number]    Therapy Diagnosis:   Encounter Diagnoses   Name Primary?    Left wrist pain Yes    Stiffness of left wrist joint     Swelling of left wrist        Physician: Echo Aguirre PA-C    Physician Orders: 2x/wk x 8-12 weeks   Medical Diagnosis: Left wrist pain [M25.532]  Kienböck's disease, left [M92.212]     Surgical Procedure and Date OR (Conservative Treatment) Date of Injury/Onset: status post left wrist proximal carpectomy with graft jacket and PIN neurectomy by Dr. Hall on 9/6/23      Evaluation Date: 10/20/2023  Insurance Authorization Period Expiration: 9/19/2023  Plan of Care Expiration: 1/12/2024  Date of Return to MD: Early December  Visit # / Visits authorized: 1 / 1  FOTO:   48% Function on 10/20/2023    Precautions:  Standard, Diabetes, and Weightbearing, fall risk    Time In: 9:30 am  Time Out: 10:30 am  Total Billable Time: 55  minutes      SUBJECTIVE   Date of Onset/ History of Current Condition/Mechanism of Injury: Vicky reports: "long as I can remember."     Falls: Feb 2023      Dominant Side: Right  Involved Side: left  Pt reports: that she has not been able to attend therapy recently due to other medical issues.   Response to previous treatment:increase in ROM and decrease in pain and edema  Functional change: NA. Post-op precautions    Pain: 2/10 at this moment. Highest rataed at a 5/10  Location: left wrist and hand    OBJECTIVE   Objective Measures updated at progress report unless specified.    Observation/Appearance: Edema visibly decreased. Localized edema remains at the wrist. All cyanosis and fluctuations in color of the fingers has resolved..     Edema. Measured in centimeters.     10/20/2023 10/20/2023 11/14/2023     Left Right Left   2in. Above elbow        2in. Below elbow        Wrist Crease 20.7 16.7 19.6   Figure of 8      "   MCPs 20.1 19.5 19.2         Elbow and Wrist ROM. Measured in degrees.    10/20/2023 11/14/2023     left  left   Elbow Ext/Flex       Supination/Pronation       Wrist Ext/Flex 30/42 44/55    Wrist RD/UD 0/20  8/25      Hand ROM. Measured in degrees.     10/20/2023 10/20/2023           Fingers 3/4 fist with effort. Full extension with obvious OA changes  WNL with  obvious OA changes            Thumb: MP 55                  IP 28         Rad ADD/ABD 30         Pal ADD/ABD 40            Opposition Mid phalanx SF MP flex crease SF        Strength (Dynamometer) and Pinch Strength (Pinch Gauge)  Measured in pounds.    Contraindicated at present             Rung II       De Leon Pinch       3pt Pinch       2pt Pinch          Sensation: Vicky denies deficits other than mild decrease in sensation at sx site.           Limitation/Restriction for FOTO wrist Survey     Therapist reviewed FOTO scores for Vicky Alvarado on 10/20/2023.   FOTO documents entered into [employer] - see Media section.     Limitation Score: 48%            Treatment     Vicky received the treatments listed below:     Supervised modalities after being cleared for contradictions: Paraffin with Hot Pack - x 10 min to decrease pain and increase soft tissue extensibility . Pt does not like fluido.    Direct contact modalities after being cleared for contraindications: NT    Manual therapy techniques: 10 min.   Manual Lymphatic Drainage  and the following:   Gentle PROM: MP flex, hook, straight fist, full fist     Therapeutic exercises to develop ROM for 25  minutes, including:  Exercise Reps/Time    reassessment  Rom and circumference    Gentle Wrist AROM: e/f, RD/UD x 20 reps for flex/extension and 10 for r/ud   Gentle A/AAROM: Wave, Straight Fist, Hook, Fists, Spreads X 10 ea   Thumb AROM:Pinky Slides X 10   Wrist arom: flex/ext  With weighted wheel x 3 min   Fa arom sup/pro With weighted wheel x 3 min   Wrist arom over wedge holding small dowel  With  FA in 3 planes ,3/10 reps each way   Gyro wheel for wrist arom X 2min          i           Therapeutic activities to improve functional performance 10 min  in-hand manipulation pompoms 3 min   isospheres manipulation 3 min  in-hand manipulation coins x 3 min    Neuromuscular re-education activities (NT)  Patient Education and Home Exercises      Education provided:   -   - Progress towards goals     Written Home Exercises Provided: Patient instructed to cont prior HEP.  Exercises were reviewed and Vicky was able to demonstrate them prior to the end of the session.  Vicky demonstrated good  understanding of the HEP provided. See EMR under Patient Instructions for exercises provided during therapy sessions.      ASSESSMENT     Upon formal reassessment, good increases noted in wrist and digital rom. Edema also noted to decrease.  .   Vicky is progressing well towards her goals and there are no updates to goals at this time. Pt prognosis is Good.     Pt will continue to benefit from skilled outpatient occupational therapy to address the deficits listed in the problem list on initial evaluation, provide pt/family education and to maximize pt's level of independence in the home and community environment.     Pt's spiritual, cultural and educational needs considered and pt agreeable to plan of care and goals.    Anticipated barriers to occupational therapy: Duration of condition    Goals:  The following goals were discussed with the patient and patient is in agreement with them as to be addressed in the treatment plan.      Long Term Goals (LTGs); to be met by discharge.  LTG #1: Pt will report a pain level of 2 out of 10 with ADL's.        LTG #2: Pt will demo improved FOTO score by 20 points.   LTG #3: Pt will return to prior level of function for ADLs and household management.      Short Term Goals (STGs); to be met within 6 weeks (12/1/2023).  STG #1: Pt will report 4 out of 10 pain level with ADL's.  STG #2: Pt  will report/demo independence in managing clothes fasteners.  STG #3: Pt will reports ability to use the left hand for light hygiene.  STG #4: Pt will demo an increase of wrist extension and wrist flexion by 20 degrees each.  STG #5: Pt will demo full ROM of all digits    PLAN     Continue skilled occupational therapy with individualized plan of care focusing on slow progression of treatment as tolerated and appropriate.  Elena Herrmann, OT

## 2023-11-17 ENCOUNTER — CLINICAL SUPPORT (OUTPATIENT)
Dept: REHABILITATION | Facility: HOSPITAL | Age: 58
End: 2023-11-17
Payer: COMMERCIAL

## 2023-11-17 DIAGNOSIS — M25.432 SWELLING OF LEFT WRIST: ICD-10-CM

## 2023-11-17 DIAGNOSIS — M25.532 LEFT WRIST PAIN: Primary | ICD-10-CM

## 2023-11-17 DIAGNOSIS — M25.632 STIFFNESS OF LEFT WRIST JOINT: ICD-10-CM

## 2023-11-17 PROCEDURE — 97018 PARAFFIN BATH THERAPY: CPT

## 2023-11-17 PROCEDURE — 97110 THERAPEUTIC EXERCISES: CPT

## 2023-11-17 PROCEDURE — 97140 MANUAL THERAPY 1/> REGIONS: CPT

## 2023-11-17 NOTE — PROGRESS NOTES
"  [facility]  Occupational Therapy Treatment Note    Date: 11/17/2023  Name: Vicky Alvarado  Clinic Number: [id number]    Therapy Diagnosis:   Encounter Diagnoses   Name Primary?    Left wrist pain Yes    Stiffness of left wrist joint     Swelling of left wrist          Physician: Echo Aguirre PA-C    Physician Orders: 2x/wk x 8-12 weeks   Medical Diagnosis: Left wrist pain [M25.532]  Kienböck's disease, left [M92.212]     Surgical Procedure and Date OR (Conservative Treatment) Date of Injury/Onset: status post left wrist proximal carpectomy with graft jacket and PIN neurectomy by Dr. Hall on 9/6/23      Evaluation Date: 10/20/2023  Insurance Authorization Period Expiration: 9/19/2023  Plan of Care Expiration: 1/12/2024  Date of Return to MD: Early December  Visit # / Visits authorized: 1 / 1  FOTO:   48% Function on 10/20/2023    Precautions:  Standard, Diabetes, and Weightbearing, fall risk    Time In: 1:03 pm  Time Out: 2:10 pm  Total Billable Time: 63 minutes (lost @ 5 minutes to fire drill)      SUBJECTIVE     Date of Onset/ History of Current Condition/Mechanism of Injury: Vicky reports: "long as I can remember."     Falls: Feb 2023      Dominant Side: Right  Involved Side: left  Pt reports that her pain "Is nothing that is not intolerable."  Response to previous treatment:increase in ROM and decrease in pain and edema  Functional change: Reports that she is now able    Pain: 2/10   Location: left wrist and hand    OBJECTIVE   Objective Measures updated at progress report unless specified.    Observation/Appearance: Edema visibly decreased. Localized edema remains at the wrist. All cyanosis and fluctuations in color of the fingers has resolved..     Edema. Measured in centimeters.     10/20/2023 10/20/2023 11/14/2023     Left Right Left   2in. Above elbow        2in. Below elbow        Wrist Crease 20.7 16.7 19.6   Figure of 8        MCPs 20.1 19.5 19.2         Elbow " and Wrist ROM. Measured in degrees.    10/20/2023 11/14/2023     left  left   Elbow Ext/Flex       Supination/Pronation       Wrist Ext/Flex 30/42 44/55    Wrist RD/UD 0/20 8/25      Hand ROM. Measured in degrees.     10/20/2023      ;eft      Fingers 3/4 fist with effort. Full extension with obvious OA changes  WNL with  obvious OA changes            Thumb: MP 55                  IP 28         Rad ADD/ABD 30         Pal ADD/ABD 40            Opposition Mid phalanx SF MP flex crease SF        Strength (Dynamometer) and Pinch Strength (Pinch Gauge)  Measured in pounds.    Contraindicated at present             Rung II       De Leon Pinch       3pt Pinch       2pt Pinch          Sensation: Vicky denies deficits other than mild decrease in sensation at sx site.           Limitation/Restriction for FOTO wrist Survey     Therapist reviewed FOTO scores for Vicky Alvarado on 10/20/2023.   FOTO documents entered into [employer] - see Media section.     Limitation Score: 48%            Treatment     Vicky received the treatments listed below:     Supervised modalities after being cleared for contradictions: Paraffin with Hot Pack - x 10 min to decrease pain and increase soft tissue extensibility . Pt does not like fluido.    Direct contact modalities after being cleared for contraindications: NT    Manual therapy techniques:  8 min.   Manual Lymphatic Drainage  and the following:   Gentle PROM: MP flex, hook, straight fist, full fist     Therapeutic exercises to develop ROM for 34 minutes, including:  Exercise Reps/Time       Gentle Wrist AROM: e/f, RD/UD x 20 reps    Gentle AROM: Wave, Straight Fist, Hook, Fists,  X 10 ea   Thumb AROM:Pinky Slides X 10   Wrist arom: flex/ext  With weighted wheel x 3 min   Fa arom sup/pro With weighted wheel x 3 min   Wrist arom over wedge holding small dowel  With FA in 3 planes ,2/10 reps each way   Gyro wheel for wrist arom X 3 min    Wrist maze X 3 min                Therapeutic  activities to improve functional performance 6 min  In-hand manipulation Octy x 3 min  in-hand manipulation pompoms 3 min   isospheres manipulation x 3 min (NT)  in-hand manipulation coins x (NT)    Neuromuscular re-education activities (NT)  Patient Education and Home Exercises      Education provided:   -   - Progress towards goals     Written Home Exercises Provided: Patient instructed to cont prior HEP.  Exercises were reviewed and Vicky was able to demonstrate them prior to the end of the session.  Vicky demonstrated good  understanding of the HEP provided. See EMR under Patient Instructions for exercises provided during therapy sessions.      ASSESSMENT   Vicky reports that her pain is not significant at this time. She continues to work in clinic. Wrist rom, payton extension, remains quite limited. Edema remains but is decreasing.     Vicky is progressing well towards her goals and there are no updates to goals at this time. Pt prognosis is Good.     Pt will continue to benefit from skilled outpatient occupational therapy to address the deficits listed in the problem list on initial evaluation, provide pt/family education and to maximize pt's level of independence in the home and community environment.     Pt's spiritual, cultural and educational needs considered and pt agreeable to plan of care and goals.    Anticipated barriers to occupational therapy: Duration of condition    Goals:  The following goals were discussed with the patient and patient is in agreement with them as to be addressed in the treatment plan.      Long Term Goals (LTGs); to be met by discharge.  LTG #1: Pt will report a pain level of 2 out of 10 with ADL's.        LTG #2: Pt will demo improved FOTO score by 20 points.   LTG #3: Pt will return to prior level of function for ADLs and household management.      Short Term Goals (STGs); to be met within 6 weeks (12/1/2023).  STG #1: Pt will report 4 out of 10 pain level with ADL's.  STG  #2: Pt will report/demo independence in managing clothes fasteners.  STG #3: Pt will reports ability to use the left hand for light hygiene.  STG #4: Pt will demo an increase of wrist extension and wrist flexion by 20 degrees each.  STG #5: Pt will demo full ROM of all digits    PLAN     Continue skilled occupational therapy with individualized plan of care focusing on slow progression of treatment as tolerated and appropriate.  Elena Herrmann, OT

## 2023-11-20 ENCOUNTER — CLINICAL SUPPORT (OUTPATIENT)
Dept: REHABILITATION | Facility: HOSPITAL | Age: 58
End: 2023-11-20
Payer: COMMERCIAL

## 2023-11-20 DIAGNOSIS — M25.632 STIFFNESS OF LEFT WRIST JOINT: ICD-10-CM

## 2023-11-20 DIAGNOSIS — M25.532 LEFT WRIST PAIN: Primary | ICD-10-CM

## 2023-11-20 DIAGNOSIS — M25.432 SWELLING OF LEFT WRIST: ICD-10-CM

## 2023-11-20 PROCEDURE — 97110 THERAPEUTIC EXERCISES: CPT

## 2023-11-20 PROCEDURE — 97018 PARAFFIN BATH THERAPY: CPT

## 2023-11-20 PROCEDURE — 97530 THERAPEUTIC ACTIVITIES: CPT

## 2023-11-20 NOTE — PROGRESS NOTES
"  [facility]  Occupational Therapy Treatment Note    Date: 11/20/2023  Name: Vicky Alvarado  Clinic Number: [id number]    Therapy Diagnosis:   Encounter Diagnoses   Name Primary?    Left wrist pain Yes    Stiffness of left wrist joint     Swelling of left wrist          Physician: Echo Aguirre PA-C    Physician Orders: 2x/wk x 8-12 weeks   Medical Diagnosis: Left wrist pain [M25.532]  Kienböck's disease, left [M92.212]     Surgical Procedure and Date OR (Conservative Treatment) Date of Injury/Onset: status post left wrist proximal carpectomy with graft jacket and PIN neurectomy by Dr. Hall on 9/6/23      Evaluation Date: 10/20/2023  Insurance Authorization Period Expiration: 9/19/2023  Plan of Care Expiration: 1/12/2024  Date of Return to MD: Early December  Visit # / Visits authorized: 1 / 1  FOTO:   48% Function on 10/20/2023    Precautions:  Standard, Diabetes, and Weightbearing, fall risk    Time In: 12:00 pm  Time Out: 1:05 pm  Total Billable Time: 60 minutes     SUBJECTIVE     Date of Onset/ History of Current Condition/Mechanism of Injury: Vicky reports: "long as I can remember."     Falls: Feb 2023      Dominant Side: Right  Involved Side: left  Pt reports decrease in pain and increase in rom.  Response to previous treatment:increase in ROM and decrease in pain and edema  Functional change: nothing    Pain: 3/10   Location: left wrist and hand    OBJECTIVE   Objective Measures updated at progress report unless specified.    Observation/Appearance: Edema visibly decreased. Localized edema remains at the wrist. All cyanosis and fluctuations in color of the fingers has resolved..     Edema. Measured in centimeters.     10/20/2023 10/20/2023 11/14/2023     Left Right Left   2in. Above elbow        2in. Below elbow        Wrist Crease 20.7 16.7 19.6   Figure of 8        MCPs 20.1 19.5 19.2         Elbow and Wrist ROM. Measured in degrees.    10/20/2023 11/14/2023     " left  left   Elbow Ext/Flex       Supination/Pronation       Wrist Ext/Flex 30/42 44/55    Wrist RD/UD 0/20  8/25      Hand ROM. Measured in degrees.     10/20/2023      ;eft      Fingers 3/4 fist with effort. Full extension with obvious OA changes  WNL with  obvious OA changes            Thumb: MP 55                  IP 28         Rad ADD/ABD 30         Pal ADD/ABD 40            Opposition Mid phalanx SF MP flex crease SF        Strength (Dynamometer) and Pinch Strength (Pinch Gauge)  Measured in pounds.    Contraindicated at present             Rung II       De Leon Pinch       3pt Pinch       2pt Pinch          Sensation: Vicky denies deficits other than mild decrease in sensation at sx site.           Limitation/Restriction for FOTO wrist Survey     Therapist reviewed FOTO scores for Vicky Alvarado on 10/20/2023.   FOTO documents entered into [employer] - see Media section.     Limitation Score: 48%            Treatment     Vicky received the treatments listed below:     Supervised modalities after being cleared for contradictions: Paraffin with Hot Pack - x 10 min to decrease pain and increase soft tissue extensibility . Pt does not like fluido.    Direct contact modalities after being cleared for contraindications: NT    Manual therapy techniques: 2 min.   Manual Lymphatic Drainage          Therapeutic exercises to develop ROM for 38 minutes, including:  Exercise Reps/Time       Gentle Wrist AROM: e/f, RD/UD x 20 reps    Gentle AROM: Wave, Straight Fist, Hook, Fists,  X 10 ea   Thumb AROM:Pinky Slides X 10   Wrist arom: flex/ext  With weighted wheel x 3 min   Fa arom sup/pro With weighted wheel x 3 min   Wrist arom over wedge holding small dowel  With FA in 3 planes ,3/10 reps each way   Gyro wheel for wrist arom X 3 min    Wrist maze X 3 min                Therapeutic activities to improve functional performance 10 min  In-hand manipulation Octy x 3 min (NT)  in-hand manipulation pompoms 3 min    isospheres manipulation x 3 min   in-hand manipulation coins x 3 min    Neuromuscular re-education activities (NT)  Patient Education and Home Exercises      Education provided:   -   - Progress towards goals     Written Home Exercises Provided: Patient instructed to cont prior HEP.  Exercises were reviewed and Vicky was able to demonstrate them prior to the end of the session.  Vicky demonstrated good  understanding of the HEP provided. See EMR under Patient Instructions for exercises provided during therapy sessions.      ASSESSMENT   Vicky reports that her pain is not significant at this time. She continues to work in clinic. Wrist rom, payton extension, remains quite limited. Edema remains but is decreasing.     Vicky is progressing well towards her goals and there are no updates to goals at this time. Pt prognosis is Good.     Pt will continue to benefit from skilled outpatient occupational therapy to address the deficits listed in the problem list on initial evaluation, provide pt/family education and to maximize pt's level of independence in the home and community environment.     Pt's spiritual, cultural and educational needs considered and pt agreeable to plan of care and goals.    Anticipated barriers to occupational therapy: Duration of condition    Goals:  The following goals were discussed with the patient and patient is in agreement with them as to be addressed in the treatment plan.      Long Term Goals (LTGs); to be met by discharge.  LTG #1: Pt will report a pain level of 2 out of 10 with ADL's.        LTG #2: Pt will demo improved FOTO score by 20 points.   LTG #3: Pt will return to prior level of function for ADLs and household management.      Short Term Goals (STGs); to be met within 6 weeks (12/1/2023).  STG #1: Pt will report 4 out of 10 pain level with ADL's.  STG #2: Pt will report/demo independence in managing clothes fasteners.  STG #3: Pt will reports ability to use the left hand for  light hygiene.  STG #4: Pt will demo an increase of wrist extension and wrist flexion by 20 degrees each.  STG #5: Pt will demo full ROM of all digits    PLAN     Continue skilled occupational therapy with individualized plan of care focusing on slow progression of treatment as tolerated and appropriate.  Elena Herrmann, OT

## 2023-11-27 ENCOUNTER — CLINICAL SUPPORT (OUTPATIENT)
Dept: REHABILITATION | Facility: HOSPITAL | Age: 58
End: 2023-11-27
Payer: COMMERCIAL

## 2023-11-27 DIAGNOSIS — M25.432 SWELLING OF LEFT WRIST: ICD-10-CM

## 2023-11-27 DIAGNOSIS — M25.632 STIFFNESS OF LEFT WRIST JOINT: ICD-10-CM

## 2023-11-27 DIAGNOSIS — M25.532 LEFT WRIST PAIN: Primary | ICD-10-CM

## 2023-11-27 PROCEDURE — 97530 THERAPEUTIC ACTIVITIES: CPT

## 2023-11-27 PROCEDURE — 97110 THERAPEUTIC EXERCISES: CPT

## 2023-11-27 PROCEDURE — 97018 PARAFFIN BATH THERAPY: CPT

## 2023-11-27 NOTE — PROGRESS NOTES
"  [facility]  Occupational Therapy Treatment Note    Date: 11/27/2023  Name: Vicky Alvarado  Clinic Number: [id number]    Therapy Diagnosis:   Encounter Diagnoses   Name Primary?    Left wrist pain Yes    Stiffness of left wrist joint     Swelling of left wrist            Physician: Echo Aguirre PA-C    Physician Orders: 2x/wk x 8-12 weeks   Medical Diagnosis: Left wrist pain [M25.532]  Kienböck's disease, left [M92.212]     Surgical Procedure and Date OR (Conservative Treatment) Date of Injury/Onset: status post left wrist proximal carpectomy with graft jacket and PIN neurectomy by Dr. Hall on 9/6/23      Evaluation Date: 10/20/2023  Insurance Authorization Period Expiration: 9/19/2023  Plan of Care Expiration: 1/12/2024  Date of Return to MD: Early December  Visit # / Visits authorized: 1 / 1  FOTO: [email or web address]  48% Function on 10/20/2023  ...Function on 11/27/2023  Precautions:  Standard, Diabetes, and Weightbearing, fall risk    Time In: 11:10 am  Time Out: 12: 10 pm  Total Billable Time: 58 minutes     SUBJECTIVE     Date of Onset/ History of Current Condition/Mechanism of Injury: Vicky reports: "long as I can remember."     Falls: Feb 2023      Dominant Side: Right  Involved Side: left  Pt reports increase in pain with the change in weather.  Response to previous treatment:Nothing reported  Functional change: "I have been using my hand more."    Pain: 4/10   Location: left dorso-ulnar wrist    OBJECTIVE   Objective Measures updated at progress report unless specified.    Observation/Appearance: Edema visibly decreased. Localized edema remains at the wrist. All cyanosis and fluctuations in color of the fingers has resolved..     Edema. Measured in centimeters.     10/20/2023 10/20/2023 11/14/2023 11/27/2023     Left Right Left    2in. Above elbow         2in. Below elbow         Wrist Crease 20.7 16.7 19.6 19.4   Figure of 8         MCPs 20.1 19.5 19.2          Elbow and " Wrist ROM. Measured in degrees.    10/20/2023 11/14/2023 11/27/2023     left  left left   Elbow Ext/Flex        Supination/Pronation        Wrist Ext/Flex 30/42 44/55  44/55   Wrist RD/UD 0/20 8/25 8/23      Hand ROM. Measured in degrees.     10/20/2023      ;eft      Fingers 3/4 fist with effort. Full extension with obvious OA changes  WNL with  obvious OA changes            Thumb: MP 55                  IP 28         Rad ADD/ABD 30         Pal ADD/ABD 40            Opposition Mid phalanx SF MP flex crease SF        Strength (Dynamometer) and Pinch Strength (Pinch Gauge)  Measured in pounds.    Contraindicated at present             Rung II       [place] Pinch       3pt Pinch       2pt Pinch          Sensation: Vicky denies deficits other than mild decrease in sensation at sx site.           Limitation/Restriction for FOTO wrist Survey     Therapist reviewed FOTO scores for Vicky Alvarado on 10/20/2023.   FOTO documents entered into [employer] - see Media section.     Limitation Score: 48%            Treatment     Vicky received the treatments listed below:     Supervised modalities after being cleared for contradictions: Paraffin with Hot Pack - x 10 min to decrease pain and increase soft tissue extensibility . Pt does not like fluido.    Direct contact modalities after being cleared for contraindications: NT    Manual therapy techniques: NT  Manual Lymphatic Drainage          Therapeutic exercises to develop ROM for 40  minutes, including:  Exercise Reps/Time   Reassessment   arom       Gentle Wrist AAROM: e/f, AROM RD/UD x 20 reps    Gentle AROM: Wave, Straight Fist, Hook, Fists,  X 10 ea   Thumb AROM:Pinky Slides X 10           Wrist arom over wedge holding 1 lb dumbbell With FA in 3 planes ,3/10 reps each way        Wrist maze X 3 min                Therapeutic activities to improve functional performance 8 min  In-hand manipulation Octy x 3 min (NT)  in-hand manipulation pompoms 3 min (NT)  isospheres  manipulation x 3 min   in-hand manipulation buttons x 3 min    Neuromuscular re-education activities (NT)  Patient Education and Home Exercises      Education provided:   -   - Progress towards goals     Written Home Exercises Provided: Patient instructed to cont prior HEP.  Exercises were reviewed and Vicky was able to demonstrate them prior to the end of the session.  Vicky demonstrated good  understanding of the HEP provided. See EMR under Patient Instructions for exercises provided during therapy sessions.      ASSESSMENT   Reassessment shows slight decrease in edema. It is localized now at the dorso-ulnar wrist. Wrist rom is status quo and may have reached a plateau. It is anticipated that pt will not achieve full wrist rom.    Vicky is progressing well towards her goals and there are no updates to goals at this time. Pt prognosis is Good.     Pt will continue to benefit from skilled outpatient occupational therapy to address the deficits listed in the problem list on initial evaluation, provide pt/family education and to maximize pt's level of independence in the home and community environment.     Pt's spiritual, cultural and educational needs considered and pt agreeable to plan of care and goals.    Anticipated barriers to occupational therapy: Duration of condition    Goals:  The following goals were discussed with the patient and patient is in agreement with them as to be addressed in the treatment plan.      Long Term Goals (LTGs); to be met by discharge.  LTG #1: Pt will report a pain level of 2 out of 10 with ADL's.        LTG #2: Pt will demo improved FOTO score by 20 points.   LTG #3: Pt will return to prior level of function for ADLs and household management.      Short Term Goals (STGs); to be met within 6 weeks (12/1/2023).  STG #1: Pt will report 4 out of 10 pain level with ADL's.  STG #2: Pt will report/demo independence in managing clothes fasteners.  STG #3: Pt will reports ability to use  the left hand for light hygiene.  STG #4: Pt will demo an increase of wrist extension and wrist flexion by 20 degrees each.  STG #5: Pt will demo full ROM of all digits    PLAN     Continue skilled occupational therapy with individualized plan of care focusing on slow progression of treatment as tolerated and appropriate.  Elena Herrmann, OT

## 2023-12-04 ENCOUNTER — CLINICAL SUPPORT (OUTPATIENT)
Dept: REHABILITATION | Facility: HOSPITAL | Age: 58
End: 2023-12-04
Payer: COMMERCIAL

## 2023-12-04 DIAGNOSIS — M25.532 LEFT WRIST PAIN: Primary | ICD-10-CM

## 2023-12-04 DIAGNOSIS — M25.432 SWELLING OF LEFT WRIST: ICD-10-CM

## 2023-12-04 DIAGNOSIS — M25.632 STIFFNESS OF LEFT WRIST JOINT: ICD-10-CM

## 2023-12-04 PROCEDURE — 97110 THERAPEUTIC EXERCISES: CPT

## 2023-12-04 PROCEDURE — 97018 PARAFFIN BATH THERAPY: CPT

## 2023-12-04 NOTE — PROGRESS NOTES
"  [facility]  Occupational Therapy Treatment Note    Date: 12/4/2023  Name: Vicky Alvardao  Clinic Number: [id number]    Therapy Diagnosis:   Encounter Diagnoses   Name Primary?    Left wrist pain Yes    Stiffness of left wrist joint     Swelling of left wrist              Physician: Echo Aguirre PA-C    Physician Orders: 2x/wk x 8-12 weeks   Medical Diagnosis: Left wrist pain [M25.532]  Kienböck's disease, left [M92.212]     Surgical Procedure and Date OR (Conservative Treatment) Date of Injury/Onset: status post left wrist proximal carpectomy with graft jacket and PIN neurectomy by Dr. Hall on 9/6/23      Evaluation Date: 10/20/2023  Insurance Authorization Period Expiration: 9/19/2023  Plan of Care Expiration: 1/12/2024  Date of Return to MD: Early December  Visit # / Visits authorized: 7/20  FOTO: [email or web address]  48% Function on 10/20/2023  ...Function on 11/27/2023  Precautions:  Standard, Diabetes, and Weightbearing, fall risk    Time In: 11:00 am  Time Out: 12:00 pm  Total Billable Time: 60 minutes     SUBJECTIVE     Date of Onset/ History of Current Condition/Mechanism of Injury: Vicky reports: "long as I can remember."     Falls: Feb 2023      Dominant Side: Right  Involved Side: left      Response to previous treatment: decrease in pain increase in motion  Functional change:can now open a can and hold the steering wheel    Pain:0-2 /10 "sometimes it still aches...When I try to do something I haven't done..like opening a can. I can hold the steering wheel better."  Location: left dorso-ulnar wrist    OBJECTIVE   Objective Measures updated at progress report unless specified.    Observation/Appearance: Edema visibly decreased. Localized edema remains at the wrist. All cyanosis and fluctuations in color of the fingers has resolved..     Edema. Measured in centimeters.     10/20/2023 10/20/2023 11/14/2023 11/27/2023 12/4/2023     Left Right Left  Left   2in. Above elbow       "    2in. Below elbow          Wrist Crease 20.7 16.7 19.6 19.4 18.8   Figure of 8          MCPs 20.1 19.5 19.2           Elbow and Wrist ROM. Measured in degrees.    10/20/2023 11/14/2023 11/27/2023 12/4/2023     left  left left left   Elbow Ext/Flex         Supination/Pronation         Wrist Ext/Flex 30/42 44/55  44/55 49/54   Wrist RD/UD 0/20  8/25 8/23 10/23      Hand ROM. Measured in degrees.     10/20/2023  12/4/2023     ;eft       Fingers 3/4 fist with effort. Full extension with obvious OA changes  WNL with  obvious OA changes  WNL with  obvious OA changes            Thumb: MP 55                   IP 28          Rad ADD/ABD 30          Pal ADD/ABD 40             Opposition Mid phalanx SF MP flex crease SF  Proximal to DPC DV       Strength (Dynamometer) and Pinch Strength (Pinch Gauge)  Measured in pounds.    12/4/2023 12/4/2023      Left Right    Rung II 14 ,20,21   Avg = 18 43, 38, 43   Avg = 41   Key Pinch  11,11,10.5  14,15,15   3pt Pinch  11,9.5,10.5 10,12 ,12   2pt Pinch          Sensation: Vicky denies deficits other than mild decrease in sensation at sx site.           Limitation/Restriction for FOTO wrist Survey     Therapist reviewed FOTO scores for Vicky Alvarado on 10/20/2023.   FOTO documents entered into [employer] - see Media section.     Limitation Score: 48%            Treatment     Vicky received the treatments listed below:     Supervised modalities after being cleared for contradictions: Paraffin with Hot Pack - x 10 min to decrease pain and increase soft tissue extensibility . Pt does not like fluido.    Direct contact modalities after being cleared for contraindications: NT    Manual therapy techniques: NT  Manual Lymphatic Drainage        herapeutic exercises to develop ROM for 47  minutes, including:  Exercise Reps/Time   Reassessment   arom   Assessment    and pinch strength   Gentle Wrist AROM: e/f, AROM RD/UD x 10+ reps each   Gentle AROM: Wave, Straight Fist, Hook,  Fists,  X 10 ea   Thumb AROM:Pinky Slides X 10           Wrist arom over wedge holding 1 lb dumbbell With FA in 3 planes ,3/10 reps each way        Wrist maze X 3 min    isometric  Yellow therabar: oscillations for wrist flex/extension, wrist r/ud, circumduction ea way 1 min each           Therapeutic activities to improve functional performance 3 min    in-hand manipulation buttons x 3 min    Neuromuscular re-education activities (NT)  Patient Education and Home Exercises      Education provided:   -   - Progress towards goals     Written Home Exercises Provided: Patient instructed to cont prior HEP.  Exercises were reviewed and Vicky was able to demonstrate them prior to the end of the session.  Vicky demonstrated good  understanding of the HEP provided. See EMR under Patient Instructions for exercises provided during therapy sessions.      ASSESSMENT   9 wks, 6 days post-op sx. Reassessment shows continued decrease in edema. It remains localized  at the dorso-ulnar wrist. Issued small compression glove to further address this. Wrist rom is slightly improved in extension. It is anticipated that pt will not achieve full wrist rom. Baseline assessment of  and pinch strength reveals weakness as expected at this time. Pain increases with hand use, however, this is improving.    Vicky is progressing well towards her goals and there are no updates to goals at this time. Pt prognosis is Good.     Pt will continue to benefit from skilled outpatient occupational therapy to address the deficits listed in the problem list on initial evaluation, provide pt/family education and to maximize pt's level of independence in the home and community environment.     Pt's spiritual, cultural and educational needs considered and pt agreeable to plan of care and goals.    Anticipated barriers to occupational therapy: Duration of condition    Goals:  The following goals were discussed with the patient and patient is in agreement  with them as to be addressed in the treatment plan.      Long Term Goals (LTGs); to be met by discharge.  LTG #1: Pt will report a pain level of 2 out of 10 with ADL's.        LTG #2: Pt will demo improved FOTO score by 20 points.   LTG #3: Pt will return to prior level of function for ADLs and household management.      Short Term Goals (STGs); to be met within 6 weeks (12/1/2023).  STG #1: Pt will report 4 out of 10 pain level with ADL's. progressing  STG #2: Pt will report/demo independence in managing clothes fasteners.(MET)  STG #3: Pt will reports ability to use the left hand for light hygiene. (MET)  STG #4: Pt will demo an increase of wrist extension and wrist flexion by 20 degrees each. (Partially met)  STG #5: Pt will demo full ROM of all digits (MET)    PLAN     Continue skilled occupational therapy with individualized plan of care focusing on slow progression of treatment as tolerated and appropriate.  Elena Herrmann, OT

## 2023-12-05 ENCOUNTER — OFFICE VISIT (OUTPATIENT)
Dept: ORTHOPEDICS | Facility: CLINIC | Age: 58
End: 2023-12-05
Payer: COMMERCIAL

## 2023-12-05 VITALS — SYSTOLIC BLOOD PRESSURE: 107 MMHG | DIASTOLIC BLOOD PRESSURE: 54 MMHG | HEART RATE: 85 BPM

## 2023-12-05 DIAGNOSIS — Z98.890 POST-OPERATIVE STATE: Primary | ICD-10-CM

## 2023-12-05 DIAGNOSIS — R52 PAIN: Primary | ICD-10-CM

## 2023-12-05 PROCEDURE — 99024 POSTOP FOLLOW-UP VISIT: CPT | Mod: S$GLB,,, | Performed by: ORTHOPAEDIC SURGERY

## 2023-12-05 PROCEDURE — 99024 PR POST-OP FOLLOW-UP VISIT: ICD-10-PCS | Mod: S$GLB,,, | Performed by: ORTHOPAEDIC SURGERY

## 2023-12-05 PROCEDURE — 99999 PR PBB SHADOW E&M-EST. PATIENT-LVL II: CPT | Mod: PBBFAC,,, | Performed by: ORTHOPAEDIC SURGERY

## 2023-12-05 PROCEDURE — 3074F SYST BP LT 130 MM HG: CPT | Mod: CPTII,S$GLB,, | Performed by: ORTHOPAEDIC SURGERY

## 2023-12-05 PROCEDURE — 3078F PR MOST RECENT DIASTOLIC BLOOD PRESSURE < 80 MM HG: ICD-10-PCS | Mod: CPTII,S$GLB,, | Performed by: ORTHOPAEDIC SURGERY

## 2023-12-05 PROCEDURE — 3078F DIAST BP <80 MM HG: CPT | Mod: CPTII,S$GLB,, | Performed by: ORTHOPAEDIC SURGERY

## 2023-12-05 PROCEDURE — 1159F MED LIST DOCD IN RCRD: CPT | Mod: CPTII,S$GLB,, | Performed by: ORTHOPAEDIC SURGERY

## 2023-12-05 PROCEDURE — 3072F LOW RISK FOR RETINOPATHY: CPT | Mod: CPTII,S$GLB,, | Performed by: ORTHOPAEDIC SURGERY

## 2023-12-05 PROCEDURE — 1159F PR MEDICATION LIST DOCUMENTED IN MEDICAL RECORD: ICD-10-PCS | Mod: CPTII,S$GLB,, | Performed by: ORTHOPAEDIC SURGERY

## 2023-12-05 PROCEDURE — 4010F PR ACE/ARB THEARPY RXD/TAKEN: ICD-10-PCS | Mod: CPTII,S$GLB,, | Performed by: ORTHOPAEDIC SURGERY

## 2023-12-05 PROCEDURE — 99999 PR PBB SHADOW E&M-EST. PATIENT-LVL II: ICD-10-PCS | Mod: PBBFAC,,, | Performed by: ORTHOPAEDIC SURGERY

## 2023-12-05 PROCEDURE — 3074F PR MOST RECENT SYSTOLIC BLOOD PRESSURE < 130 MM HG: ICD-10-PCS | Mod: CPTII,S$GLB,, | Performed by: ORTHOPAEDIC SURGERY

## 2023-12-05 PROCEDURE — 3072F PR LOW RISK FOR RETINOPATHY: ICD-10-PCS | Mod: CPTII,S$GLB,, | Performed by: ORTHOPAEDIC SURGERY

## 2023-12-05 PROCEDURE — 4010F ACE/ARB THERAPY RXD/TAKEN: CPT | Mod: CPTII,S$GLB,, | Performed by: ORTHOPAEDIC SURGERY

## 2023-12-05 NOTE — PROGRESS NOTES
Ms. Alvarado is here today for a post-operative visit.  She is 6 weeks status post left wrist proximal carpectomy with graft jacket and PIN neurectomy by Dr. Hall on 9/6/23. She reports that she is doing well overall. Pain has gradually improved, not taking regular pain medication. Therapy scheduled to begin tomorrow.  She denies fever, chills, and sweats since the time of the surgery.     Interval 12/5/23  Patient presents again post operatively.  She states that her pain has improved greatly since the last time that she saw us.  She has been doing strengthening exercises in therapy now and is progressing well.  She also has been wearing her splint as instructed.      Physical exam:    Vitals:    12/05/23 0945   BP: (!) 107/54   Pulse: 85     Vital signs are stable, patient is afebrile.  Patient is well dressed and well groomed, no acute distress.  Alert and oriented to person, place, and time.  Cast removed. Incision well healed. There is no erythema or exudate.  There is no sign of any infection. She is NVI. Good finger motion, fair wrist motion.    Assessment: status post left wrist proximal carpectomy with graft jacket and PIN neurectomy by Dr. Hall on 9/6    Plan:    - Continue therapy for now  - Wrist splint continue for now

## 2023-12-05 NOTE — PROGRESS NOTES
Pt doing well, still has some swelling,   Pt has some motion- much like before the surgery. Very minimal pain- improved from prior to surgery  Wears a splint at grocery store

## 2023-12-07 ENCOUNTER — CLINICAL SUPPORT (OUTPATIENT)
Dept: REHABILITATION | Facility: HOSPITAL | Age: 58
End: 2023-12-07
Payer: COMMERCIAL

## 2023-12-07 DIAGNOSIS — M25.432 SWELLING OF LEFT WRIST: ICD-10-CM

## 2023-12-07 DIAGNOSIS — M25.632 STIFFNESS OF LEFT WRIST JOINT: ICD-10-CM

## 2023-12-07 DIAGNOSIS — M25.532 LEFT WRIST PAIN: Primary | ICD-10-CM

## 2023-12-07 PROCEDURE — 97530 THERAPEUTIC ACTIVITIES: CPT

## 2023-12-07 PROCEDURE — 97110 THERAPEUTIC EXERCISES: CPT

## 2023-12-07 PROCEDURE — 97018 PARAFFIN BATH THERAPY: CPT

## 2023-12-07 NOTE — PATIENT INSTRUCTIONS
[facility]  OCCUPATIONAL THERAPY  HOME EXERCISE PROGRAM     Complete the following strengthening program 1x/day.          3 minutes          3 second squeezes x 20 reps               3 logs  _             3 logs           SOCORRO Esteban, HENRRY  Certified Hand Therapist  Occupational Therapist

## 2023-12-07 NOTE — PROGRESS NOTES
"  [facility]  Occupational Therapy Treatment Note    Date: 12/7/2023  Name: Vicky Alvarado  Clinic Number: [id number]    Therapy Diagnosis:   Encounter Diagnoses   Name Primary?    Left wrist pain Yes    Stiffness of left wrist joint     Swelling of left wrist              Physician: Echo Aguirre PA-C    Physician Orders: 2x/wk x 8-12 weeks   Medical Diagnosis: Left wrist pain [M25.532]  Kienböck's disease, left [M92.212]     Surgical Procedure and Date OR (Conservative Treatment) Date of Injury/Onset: status post left wrist proximal carpectomy with graft jacket and PIN neurectomy by Dr. Hall on 9/6/23      Evaluation Date: 10/20/2023  Insurance Authorization Period Expiration: 9/19/2023  Plan of Care Expiration: 1/12/2024  Date of Return to MD: Early December  Visit # / Visits authorized: 7/20  FOTO: [email or web address]  48% Function on 10/20/2023  ...Function on 11/27/2023  Precautions:  Standard, Diabetes, and Weightbearing, fall risk    Time In: 10:52 am  Time Out: 11:55 am  Total Billable Time: 60 minutes     SUBJECTIVE     Date of Onset/ History of Current Condition/Mechanism of Injury: Vicky reports: "long as I can remember."     Falls: Feb 2023      Dominant Side: Right  Involved Side: left    Vicky stated that Dr. Nazario informed he's on her recent visit that she will not gain anymore wrist rom but is happy with her progress.  Response to previous treatment: decrease in pain increase in motion  Functional change:can now open a can and hold the steering wheel    Pain:0-2 /10 "sometimes it still aches...When I try to do something I haven't done..like opening a can. I can hold the steering wheel better."  Location: left dorso-ulnar wrist    OBJECTIVE   Objective Measures updated at progress report unless specified.    Observation/Appearance: Edema visibly decreased. Localized edema remains at the wrist. All cyanosis and fluctuations in color of the fingers has resolved..   "   Edema. Measured in centimeters.     10/20/2023 10/20/2023 11/14/2023 11/27/2023 12/4/2023     Left Right Left  Left   2in. Above elbow          2in. Below elbow          Wrist Crease 20.7 16.7 19.6 19.4 18.8   Figure of 8          MCPs 20.1 19.5 19.2           Elbow and Wrist ROM. Measured in degrees.    10/20/2023 11/14/2023 11/27/2023 12/4/2023     left  left left left   Elbow Ext/Flex         Supination/Pronation         Wrist Ext/Flex 30/42 44/55  44/55 49/54   Wrist RD/UD 0/20  8/25 8/23 10/23      Hand ROM. Measured in degrees.     10/20/2023  12/4/2023     ;eft       Fingers 3/4 fist with effort. Full extension with obvious OA changes  WNL with  obvious OA changes  WNL with  obvious OA changes            Thumb: MP 55                   IP 28          Rad ADD/ABD 30          Pal ADD/ABD 40             Opposition Mid phalanx SF MP flex crease SF  Proximal to DPC DV       Strength (Dynamometer) and Pinch Strength (Pinch Gauge)  Measured in pounds.    12/4/2023 12/4/2023      Left Right    Rung II 14 ,20,21   Avg = 18 43, 38, 43   Avg = 41   Key Pinch  11,11,10.5  14,15,15   3pt Pinch  11,9.5,10.5 10,12 ,12   2pt Pinch          Sensation: Vicky denies deficits other than mild decrease in sensation at sx site.           Limitation/Restriction for FOTO wrist Survey     Therapist reviewed FOTO scores for Vicky Alvarado on 10/20/2023.   FOTO documents entered into [employer] - see Media section.     Limitation Score: 48%            Treatment     Vicky received the treatments listed below:     Supervised modalities after being cleared for contradictions: Paraffin with Hot Pack - x 10 min to decrease pain and increase soft tissue extensibility . Pt does not like fluido.    Direct contact modalities after being cleared for contraindications: NT    Manual therapy techniques: NT  Manual Lymphatic Drainage        herapeutic exercises to develop ROM for 30  minutes, including:  Exercise Reps/Time           Gentle  Wrist AROM: e/f, AROM RD/UD x 10+ reps each   Gentle AROM: Wave, Straight Fist, Hook, Fists,  X 10 ea   Thumb AROM:Pinky Slides X 10           Wrist Pres over wedge holding 1 lb dumbbell With FA in 3 planes ,3/10 reps each way   FA PREs holding 1 lb dumbbell 20 reps       isometric  Yellow therabar: oscillations for wrist flex/extension, wrist r/ud, circumduction ea way 1 min each           Therapeutic activities to improve functional performance x 20 minutues  Theraputty Manipulation: twirling in-hand x 3 min, squeezing x 3 sec x 10 reps, pinching 3 logs ea for 3-jaw and key pinches  Educated in putty HEP and issued soft putty for home use.  Key-hole peg board for in-hand manipulation x 1 set  Functional Pinching: yellow clothespin for 3-jaw and key pinches x 3 min each      Neuromuscular re-education activities (NT)  Patient Education and Home Exercises      Education provided:   -   - Progress towards goals     Written Home Exercises Provided: Patient instructed to cont prior HEP.  Exercises were reviewed and Vicky was able to demonstrate them prior to the end of the session.  Vicky demonstrated good  understanding of the HEP provided. See EMR under Patient Instructions for exercises provided during therapy sessions.      ASSESSMENT   Vicky stated that Dr. Nazario informed he's on her recent visit that she will not gain anymore wrist rom but is happy with her progress. Advanced to putty exercise and resistive functional pinching activities. Vicky tolerated all well with c/o fatigue by end of session.    Vicky is progressing well towards her goals and there are no updates to goals at this time. Pt prognosis is Good.     Pt will continue to benefit from skilled outpatient occupational therapy to address the deficits listed in the problem list on initial evaluation, provide pt/family education and to maximize pt's level of independence in the home and community environment.     Pt's spiritual, cultural and  educational needs considered and pt agreeable to plan of care and goals.    Anticipated barriers to occupational therapy: Duration of condition    Goals:  The following goals were discussed with the patient and patient is in agreement with them as to be addressed in the treatment plan.      Long Term Goals (LTGs); to be met by discharge.  LTG #1: Pt will report a pain level of 2 out of 10 with ADL's.        LTG #2: Pt will demo improved FOTO score by 20 points.   LTG #3: Pt will return to prior level of function for ADLs and household management.      Short Term Goals (STGs); to be met within 6 weeks (12/1/2023).  STG #1: Pt will report 4 out of 10 pain level with ADL's. progressing  STG #2: Pt will report/demo independence in managing clothes fasteners.(MET)  STG #3: Pt will reports ability to use the left hand for light hygiene. (MET)  STG #4: Pt will demo an increase of wrist extension and wrist flexion by 20 degrees each. (Partially met)  STG #5: Pt will demo full ROM of all digits (MET)    PLAN     Continue skilled occupational therapy with individualized plan of care focusing on slow progression of treatment as tolerated and appropriate.  Elena Herrmann, OT

## 2023-12-12 DIAGNOSIS — K76.0 NAFLD (NONALCOHOLIC FATTY LIVER DISEASE): Chronic | ICD-10-CM

## 2023-12-12 DIAGNOSIS — G47.33 OSA (OBSTRUCTIVE SLEEP APNEA): ICD-10-CM

## 2023-12-12 DIAGNOSIS — E66.01 CLASS 3 SEVERE OBESITY DUE TO EXCESS CALORIES WITH SERIOUS COMORBIDITY AND BODY MASS INDEX (BMI) OF 40.0 TO 44.9 IN ADULT: ICD-10-CM

## 2023-12-12 DIAGNOSIS — E11.42 TYPE 2 DIABETES MELLITUS WITH DIABETIC POLYNEUROPATHY, WITHOUT LONG-TERM CURRENT USE OF INSULIN: Chronic | ICD-10-CM

## 2023-12-12 DIAGNOSIS — I10 ESSENTIAL HYPERTENSION: ICD-10-CM

## 2023-12-12 DIAGNOSIS — E78.00 PURE HYPERCHOLESTEROLEMIA: ICD-10-CM

## 2023-12-12 RX ORDER — TIRZEPATIDE 12.5 MG/.5ML
12.5 INJECTION, SOLUTION SUBCUTANEOUS
Qty: 4 PEN | Refills: 2 | Status: SHIPPED | OUTPATIENT
Start: 2023-12-12

## 2023-12-13 DIAGNOSIS — I48.91 ATRIAL FIBRILLATION, UNSPECIFIED TYPE: ICD-10-CM

## 2023-12-18 ENCOUNTER — OFFICE VISIT (OUTPATIENT)
Dept: BARIATRICS | Facility: CLINIC | Age: 58
End: 2023-12-18
Payer: COMMERCIAL

## 2023-12-18 VITALS
HEART RATE: 67 BPM | DIASTOLIC BLOOD PRESSURE: 60 MMHG | BODY MASS INDEX: 44.12 KG/M2 | HEIGHT: 63 IN | SYSTOLIC BLOOD PRESSURE: 102 MMHG | WEIGHT: 249 LBS

## 2023-12-18 DIAGNOSIS — I10 ESSENTIAL HYPERTENSION: ICD-10-CM

## 2023-12-18 DIAGNOSIS — E11.42 TYPE 2 DIABETES MELLITUS WITH DIABETIC POLYNEUROPATHY, WITHOUT LONG-TERM CURRENT USE OF INSULIN: Chronic | ICD-10-CM

## 2023-12-18 DIAGNOSIS — G47.33 OSA (OBSTRUCTIVE SLEEP APNEA): ICD-10-CM

## 2023-12-18 DIAGNOSIS — E66.01 CLASS 3 SEVERE OBESITY DUE TO EXCESS CALORIES WITH SERIOUS COMORBIDITY AND BODY MASS INDEX (BMI) OF 40.0 TO 44.9 IN ADULT: Primary | ICD-10-CM

## 2023-12-18 DIAGNOSIS — Z71.3 ENCOUNTER FOR WEIGHT LOSS COUNSELING: ICD-10-CM

## 2023-12-18 DIAGNOSIS — K76.0 NAFLD (NONALCOHOLIC FATTY LIVER DISEASE): Chronic | ICD-10-CM

## 2023-12-18 PROCEDURE — 3008F BODY MASS INDEX DOCD: CPT | Mod: CPTII,S$GLB,, | Performed by: STUDENT IN AN ORGANIZED HEALTH CARE EDUCATION/TRAINING PROGRAM

## 2023-12-18 PROCEDURE — 3074F SYST BP LT 130 MM HG: CPT | Mod: CPTII,S$GLB,, | Performed by: STUDENT IN AN ORGANIZED HEALTH CARE EDUCATION/TRAINING PROGRAM

## 2023-12-18 PROCEDURE — 1160F PR REVIEW ALL MEDS BY PRESCRIBER/CLIN PHARMACIST DOCUMENTED: ICD-10-PCS | Mod: CPTII,S$GLB,, | Performed by: STUDENT IN AN ORGANIZED HEALTH CARE EDUCATION/TRAINING PROGRAM

## 2023-12-18 PROCEDURE — 3078F DIAST BP <80 MM HG: CPT | Mod: CPTII,S$GLB,, | Performed by: STUDENT IN AN ORGANIZED HEALTH CARE EDUCATION/TRAINING PROGRAM

## 2023-12-18 PROCEDURE — 4010F ACE/ARB THERAPY RXD/TAKEN: CPT | Mod: CPTII,S$GLB,, | Performed by: STUDENT IN AN ORGANIZED HEALTH CARE EDUCATION/TRAINING PROGRAM

## 2023-12-18 PROCEDURE — 3074F PR MOST RECENT SYSTOLIC BLOOD PRESSURE < 130 MM HG: ICD-10-PCS | Mod: CPTII,S$GLB,, | Performed by: STUDENT IN AN ORGANIZED HEALTH CARE EDUCATION/TRAINING PROGRAM

## 2023-12-18 PROCEDURE — 1159F MED LIST DOCD IN RCRD: CPT | Mod: CPTII,S$GLB,, | Performed by: STUDENT IN AN ORGANIZED HEALTH CARE EDUCATION/TRAINING PROGRAM

## 2023-12-18 PROCEDURE — 99213 OFFICE O/P EST LOW 20 MIN: CPT | Mod: S$GLB,,, | Performed by: STUDENT IN AN ORGANIZED HEALTH CARE EDUCATION/TRAINING PROGRAM

## 2023-12-18 PROCEDURE — 1159F PR MEDICATION LIST DOCUMENTED IN MEDICAL RECORD: ICD-10-PCS | Mod: CPTII,S$GLB,, | Performed by: STUDENT IN AN ORGANIZED HEALTH CARE EDUCATION/TRAINING PROGRAM

## 2023-12-18 PROCEDURE — 1160F RVW MEDS BY RX/DR IN RCRD: CPT | Mod: CPTII,S$GLB,, | Performed by: STUDENT IN AN ORGANIZED HEALTH CARE EDUCATION/TRAINING PROGRAM

## 2023-12-18 PROCEDURE — 99213 PR OFFICE/OUTPT VISIT, EST, LEVL III, 20-29 MIN: ICD-10-PCS | Mod: S$GLB,,, | Performed by: STUDENT IN AN ORGANIZED HEALTH CARE EDUCATION/TRAINING PROGRAM

## 2023-12-18 PROCEDURE — 99999 PR PBB SHADOW E&M-EST. PATIENT-LVL III: CPT | Mod: PBBFAC,,, | Performed by: STUDENT IN AN ORGANIZED HEALTH CARE EDUCATION/TRAINING PROGRAM

## 2023-12-18 PROCEDURE — 4010F PR ACE/ARB THEARPY RXD/TAKEN: ICD-10-PCS | Mod: CPTII,S$GLB,, | Performed by: STUDENT IN AN ORGANIZED HEALTH CARE EDUCATION/TRAINING PROGRAM

## 2023-12-18 PROCEDURE — 3072F LOW RISK FOR RETINOPATHY: CPT | Mod: CPTII,S$GLB,, | Performed by: STUDENT IN AN ORGANIZED HEALTH CARE EDUCATION/TRAINING PROGRAM

## 2023-12-18 PROCEDURE — 3078F PR MOST RECENT DIASTOLIC BLOOD PRESSURE < 80 MM HG: ICD-10-PCS | Mod: CPTII,S$GLB,, | Performed by: STUDENT IN AN ORGANIZED HEALTH CARE EDUCATION/TRAINING PROGRAM

## 2023-12-18 PROCEDURE — 99999 PR PBB SHADOW E&M-EST. PATIENT-LVL III: ICD-10-PCS | Mod: PBBFAC,,, | Performed by: STUDENT IN AN ORGANIZED HEALTH CARE EDUCATION/TRAINING PROGRAM

## 2023-12-18 PROCEDURE — 3008F PR BODY MASS INDEX (BMI) DOCUMENTED: ICD-10-PCS | Mod: CPTII,S$GLB,, | Performed by: STUDENT IN AN ORGANIZED HEALTH CARE EDUCATION/TRAINING PROGRAM

## 2023-12-18 PROCEDURE — 3072F PR LOW RISK FOR RETINOPATHY: ICD-10-PCS | Mod: CPTII,S$GLB,, | Performed by: STUDENT IN AN ORGANIZED HEALTH CARE EDUCATION/TRAINING PROGRAM

## 2023-12-18 NOTE — PROGRESS NOTES
Subjective     Patient ID: Vicky Alvarado is a 58 y.o. female.    Chief Complaint: Follow-up, Obesity, and Weight Check    Patient presents for treatment of obesity.     Co-morbidities  HTN  HLD  DM2  ERENDIRA  GERD  NAFLD    Negative for thyroid cancer    H/o in hysterectomy    Current Physical Activity  No regular exercise routine  In OT following hand surgery    Current Eating Habits  Breakfast - protein shake  Lunch - skips  Dinner - sister cooks dinner; avoiding fried foods and fast food, eating smaller portions  Snacks  Beverages - water, SF gatorade    Medical Weight Loss  4/21/2023: 286.4 lbs, BMI 50.7, BFP 54.2%, .2 lbs, SMM 71.7 lbs, BMR 1654 kcal  6/7/2023: 273.8 lbs, BMI 48.5, BFP 55%, .6 lbs, SMM 65.9 lbs, BMR 1578 kcal  9/19/2023: 251.8 lbs, BMI 44.6, BFP 54.4%,  lbs, SMM 63.5 lbs, BMR 1494 kcal  12/18/2023: 249 lbs, BMI 44.1, BFP 53.4%, .1 lbs, SMM 61.3 lbs, BMR 1507 kcal      Review of Systems   Constitutional:  Negative for chills and fever.   Respiratory:  Negative for shortness of breath.    Cardiovascular:  Negative for chest pain and palpitations.   Gastrointestinal:  Negative for abdominal pain, nausea and vomiting.   Neurological:  Negative for dizziness and light-headedness.   Psychiatric/Behavioral:  The patient is not nervous/anxious.           Objective    Latest Reference Range & Units 03/31/23 16:14 04/18/23 15:44   WBC 3.90 - 12.70 K/uL 7.13 7.35   RBC 4.00 - 5.40 M/uL 3.65 (L) 4.10   Hemoglobin 12.0 - 16.0 g/dL 8.3 (L) 9.6 (L)   Hematocrit 37.0 - 48.5 % 30.8 (L) 33.3 (L)   MCV 82 - 98 fL 84 81 (L)   MCH 27.0 - 31.0 pg 22.7 (L) 23.4 (L)   MCHC 32.0 - 36.0 g/dL 26.9 (L) 28.8 (L)   RDW 11.5 - 14.5 % 20.8 (H) 19.5 (H)   Platelets 150 - 450 K/uL 166 254   MPV 9.2 - 12.9 fL 11.4 11.0   Gran % 38.0 - 73.0 % 76.0 (H) 80.7 (H)   Lymph % 18.0 - 48.0 % 13.3 (L) 11.6 (L)   Mono % 4.0 - 15.0 % 7.2 4.9   Eosinophil % 0.0 - 8.0 % 2.1 1.4   Basophil % 0.0 - 1.9 % 1.0 1.1    Immature Granulocytes 0.0 - 0.5 % 0.4 0.3   Gran # (ANC) 1.8 - 7.7 K/uL 5.4 5.9   Lymph # 1.0 - 4.8 K/uL 1.0 0.9 (L)   Mono # 0.3 - 1.0 K/uL 0.5 0.4   Eos # 0.0 - 0.5 K/uL 0.2 0.1   Baso # 0.00 - 0.20 K/uL 0.07 0.08   Immature Grans (Abs) 0.00 - 0.04 K/uL 0.03 0.02   nRBC 0 /100 WBC 0 0   Differential Method  Automated Automated   Iron 30 - 160 ug/dL 49    TIBC 250 - 450 ug/dL 459 (H)    Saturated Iron 20 - 50 % 11 (L)    Transferrin 200 - 375 mg/dL 310    Ferritin 20.0 - 300.0 ng/mL 19 (L)    Folate 4.0 - 24.0 ng/mL 5.7    Vitamin B-12 210 - 950 pg/mL 981 (H)    Haptoglobin 30 - 250 mg/dL 196    Pathologist Review Peripheral Smear  REVIEWED    Retic 0.5 - 2.5 % 3.3 (H)    Pathologist Review  Review completed    Protime 9.0 - 12.5 sec  12.5   INR 0.8 - 1.2   1.2   Sodium 136 - 145 mmol/L 141 141   Potassium 3.5 - 5.1 mmol/L 3.9 3.1 (L)   Chloride 95 - 110 mmol/L 102 98   CO2 23 - 29 mmol/L 27 25   Anion Gap 8 - 16 mmol/L 12 18 (H)   BUN 6 - 20 mg/dL 8 11   Creatinine 0.5 - 1.4 mg/dL 0.8 1.0   eGFR >60 mL/min/1.73 m^2 >60.0 >60.0   Glucose 70 - 110 mg/dL 104 99   Calcium 8.7 - 10.5 mg/dL 9.1 9.3   Alkaline Phosphatase 55 - 135 U/L 247 (H) 222 (H)   PROTEIN TOTAL 6.0 - 8.4 g/dL 7.1 7.9   Albumin 3.5 - 5.2 g/dL 2.9 (L) 3.4 (L)   BILIRUBIN TOTAL 0.1 - 1.0 mg/dL 1.4 (H) 1.4 (H)   AST 10 - 40 U/L 45 (H) 34   ALT 10 - 44 U/L 11 10    - 260 U/L 209    Copper 810 - 1990 ug/L 1519    (L): Data is abnormally low  (H): Data is abnormally high    Vitals:    12/18/23 1339   BP: 102/60   Pulse: 67           Physical Exam  Vitals reviewed.   Constitutional:       General: She is not in acute distress.     Appearance: Normal appearance. She is obese. She is not ill-appearing, toxic-appearing or diaphoretic.   HENT:      Head: Normocephalic and atraumatic.   Cardiovascular:      Rate and Rhythm: Normal rate.   Pulmonary:      Effort: Pulmonary effort is normal. No respiratory distress.   Skin:     General: Skin is warm and  dry.   Neurological:      Mental Status: She is alert and oriented to person, place, and time.            Assessment and Plan     Problem List Items Addressed This Visit       Type 2 diabetes mellitus with diabetic polyneuropathy, without long-term current use of insulin (Chronic)    NAFLD (nonalcoholic fatty liver disease) (Chronic)    Essential hypertension    ERENDIRA (obstructive sleep apnea)     Other Visit Diagnoses       Class 3 severe obesity due to excess calories with serious comorbidity and body mass index (BMI) of 40.0 to 44.9 in adult    -  Primary    Encounter for weight loss counseling                  - Mounjaro 12.5 mg weekly injections    - Log all food and beverage intake with a daily calorie goal of 1200 calories per day; bariatric diet plan    - Low intensity aerobic exercise for 15-30 minutes 3-5x/week

## 2023-12-19 ENCOUNTER — CLINICAL SUPPORT (OUTPATIENT)
Dept: REHABILITATION | Facility: HOSPITAL | Age: 58
End: 2023-12-19
Payer: COMMERCIAL

## 2023-12-19 DIAGNOSIS — M25.532 LEFT WRIST PAIN: Primary | ICD-10-CM

## 2023-12-19 DIAGNOSIS — M25.432 SWELLING OF LEFT WRIST: ICD-10-CM

## 2023-12-19 DIAGNOSIS — M25.632 STIFFNESS OF LEFT WRIST JOINT: ICD-10-CM

## 2023-12-19 PROCEDURE — 97110 THERAPEUTIC EXERCISES: CPT | Mod: CO

## 2023-12-19 PROCEDURE — 97018 PARAFFIN BATH THERAPY: CPT | Mod: CO

## 2023-12-19 PROCEDURE — 97530 THERAPEUTIC ACTIVITIES: CPT | Mod: CO

## 2023-12-19 NOTE — PROGRESS NOTES
"  [facility]  Occupational Therapy Treatment Note    Date: 12/19/2023  Name: Vicky Alvarado  Clinic Number: [id number]    Therapy Diagnosis:   Encounter Diagnoses   Name Primary?    Left wrist pain Yes    Stiffness of left wrist joint     Swelling of left wrist                Physician: Echo Aguirre PA-C    Physician Orders: 2x/wk x 8-12 weeks   Medical Diagnosis: Left wrist pain [M25.532]  Kienböck's disease, left [M92.212]     Surgical Procedure and Date OR (Conservative Treatment) Date of Injury/Onset: status post left wrist proximal carpectomy with graft jacket and PIN neurectomy by Dr. Hall on 9/6/23      Evaluation Date: 10/20/2023  Insurance Authorization Period Expiration: 9/19/2023  Plan of Care Expiration: 1/12/2024  Date of Return to MD: Early December  Visit # / Visits authorized: 9 /20  FOTO: [email or web address]  48% Function on 10/20/2023  ...Function on 11/27/2023  Precautions:  Standard, Diabetes, and Weightbearing, fall risk    Time In: 9:30 am  Time Out: 10:25 am  Total Billable Time: 55 minutes     SUBJECTIVE     Date of Onset/ History of Current Condition/Mechanism of Injury: Vicky reports: "long as I can remember."     Falls: Feb 2023      Dominant Side: Right  Involved Side: left    "Its a lot better, I still have trouble opening jars."  Response to previous treatment: decrease in pain increase in motion  Functional change:can now open a can and hold the steering wheel    Pain:0-2 /10 "sometimes it still aches...When I try to do something I haven't done..like opening a can. I can hold the steering wheel better."  Location: left dorso-ulnar wrist    OBJECTIVE   Objective Measures updated at progress report unless specified.    Observation/Appearance: Edema visibly decreased. Localized edema remains at the wrist. All cyanosis and fluctuations in color of the fingers has resolved..     Edema. Measured in centimeters.     10/20/2023 10/20/2023 11/14/2023 11/27/2023 " 12/4/2023     Left Right Left  Left   2in. Above elbow          2in. Below elbow          Wrist Crease 20.7 16.7 19.6 19.4 18.8   Figure of 8          MCPs 20.1 19.5 19.2           Elbow and Wrist ROM. Measured in degrees.    10/20/2023 11/14/2023 11/27/2023 12/4/2023     left  left left left   Elbow Ext/Flex         Supination/Pronation         Wrist Ext/Flex 30/42 44/55  44/55 49/54   Wrist RD/UD 0/20  8/25 8/23 10/23      Hand ROM. Measured in degrees.     10/20/2023  12/4/2023     ;eft       Fingers 3/4 fist with effort. Full extension with obvious OA changes  WNL with  obvious OA changes  WNL with  obvious OA changes            Thumb: MP 55                   IP 28          Rad ADD/ABD 30          Pal ADD/ABD 40             Opposition Mid phalanx SF MP flex crease SF  Proximal to DPC DV       Strength (Dynamometer) and Pinch Strength (Pinch Gauge)  Measured in pounds.    12/4/2023 12/4/2023      Left Right    Rung II 14 ,20,21   Avg = 18 43, 38, 43   Avg = 41   Key Pinch  11,11,10.5  14,15,15   3pt Pinch  11,9.5,10.5 10,12 ,12   2pt Pinch          Sensation: Vicky denies deficits other than mild decrease in sensation at sx site.           Limitation/Restriction for FOTO wrist Survey     Therapist reviewed FOTO scores for Vicky Alvarado on 10/20/2023.   FOTO documents entered into [employer] - see Media section.     Limitation Score: 48%            Treatment     Vicky received the treatments listed below:     Supervised modalities after being cleared for contradictions: Paraffin with Hot Pack - x 10 min to decrease pain and increase soft tissue extensibility . Pt does not like fluido.    Direct contact modalities after being cleared for contraindications: NT    Manual therapy techniques: x 5 min   Manual Lymphatic Drainage        herapeutic exercises to develop ROM for 20 minutes, including:  Exercise Reps/Time           Gentle Wrist AROM: e/f, AROM RD/UD x 10+ reps each   Gentle AROM: Wave, Straight  Fist, Hook, Fists,  X 10 ea   Thumb AROM:Pinky Slides X 10           Wrist Pres over wedge holding 1 lb dumbbell With FA in 3 planes ,3/10 reps each way   FA PREs holding 1 lb dumbbell 20 reps       isometric  Yellow therabar: oscillations for wrist flex/extension, wrist r/ud, circumduction ea way 1 min each           Therapeutic activities to improve functional performance x 20 minutues  Theraputty Manipulation: twirling in-hand x 3 min, squeezing x 3 sec x 10 reps, pinching 3 logs ea for 3-jaw and key pinches (NT)  Educated in putty HEP and issued soft putty for home use. (NT)  Key-hole peg board for in-hand manipulation x 1 set  Functional Pinching: yellow clothespin for 3-jaw and key pinches x 3 min each      Neuromuscular re-education activities (NT)  Patient Education and Home Exercises      Education provided:   -   - Progress towards goals     Written Home Exercises Provided: Patient instructed to cont prior HEP.  Exercises were reviewed and Vicky was able to demonstrate them prior to the end of the session.  Vicky demonstrated good  understanding of the HEP provided. See EMR under Patient Instructions for exercises provided during therapy sessions.      ASSESSMENT    Vicky tolerated all well with c/o mild fatigue by end of session.     Vicky is progressing well towards her goals and there are no updates to goals at this time. Pt prognosis is Good.     Pt will continue to benefit from skilled outpatient occupational therapy to address the deficits listed in the problem list on initial evaluation, provide pt/family education and to maximize pt's level of independence in the home and community environment.     Pt's spiritual, cultural and educational needs considered and pt agreeable to plan of care and goals.    Anticipated barriers to occupational therapy: Duration of condition    Goals:  The following goals were discussed with the patient and patient is in agreement with them as to be addressed in the  treatment plan.      Long Term Goals (LTGs); to be met by discharge.  LTG #1: Pt will report a pain level of 2 out of 10 with ADL's.        LTG #2: Pt will demo improved FOTO score by 20 points.   LTG #3: Pt will return to prior level of function for ADLs and household management.      Short Term Goals (STGs); to be met within 6 weeks (12/1/2023).  STG #1: Pt will report 4 out of 10 pain level with ADL's. progressing  STG #2: Pt will report/demo independence in managing clothes fasteners.(MET)  STG #3: Pt will reports ability to use the left hand for light hygiene. (MET)  STG #4: Pt will demo an increase of wrist extension and wrist flexion by 20 degrees each. (Partially met)  STG #5: Pt will demo full ROM of all digits (MET)    PLAN     Continue skilled occupational therapy with individualized plan of care focusing on slow progression of treatment as tolerated and appropriate.  ANHAI Blas/L      Client Care conference completed with evaluating therapist in regards to this patients POC as evidenced by co signature of supervising therapist.

## 2023-12-20 ENCOUNTER — LAB VISIT (OUTPATIENT)
Dept: LAB | Facility: HOSPITAL | Age: 58
End: 2023-12-20
Attending: INTERNAL MEDICINE
Payer: COMMERCIAL

## 2023-12-20 DIAGNOSIS — E11.42 TYPE 2 DIABETES MELLITUS WITH DIABETIC POLYNEUROPATHY, WITHOUT LONG-TERM CURRENT USE OF INSULIN: ICD-10-CM

## 2023-12-20 LAB
ALBUMIN SERPL BCP-MCNC: 3.1 G/DL (ref 3.5–5.2)
ALP SERPL-CCNC: 184 U/L (ref 55–135)
ALT SERPL W/O P-5'-P-CCNC: 11 U/L (ref 10–44)
ANION GAP SERPL CALC-SCNC: 13 MMOL/L (ref 8–16)
AST SERPL-CCNC: 28 U/L (ref 10–40)
BASOPHILS # BLD AUTO: 0.07 K/UL (ref 0–0.2)
BASOPHILS NFR BLD: 1 % (ref 0–1.9)
BILIRUB SERPL-MCNC: 0.9 MG/DL (ref 0.1–1)
BUN SERPL-MCNC: 12 MG/DL (ref 6–20)
CALCIUM SERPL-MCNC: 8.8 MG/DL (ref 8.7–10.5)
CHLORIDE SERPL-SCNC: 96 MMOL/L (ref 95–110)
CHOLEST SERPL-MCNC: 110 MG/DL (ref 120–199)
CHOLEST/HDLC SERPL: 3.5 {RATIO} (ref 2–5)
CO2 SERPL-SCNC: 31 MMOL/L (ref 23–29)
CREAT SERPL-MCNC: 1.4 MG/DL (ref 0.5–1.4)
DIFFERENTIAL METHOD: ABNORMAL
EOSINOPHIL # BLD AUTO: 0.2 K/UL (ref 0–0.5)
EOSINOPHIL NFR BLD: 2.6 % (ref 0–8)
ERYTHROCYTE [DISTWIDTH] IN BLOOD BY AUTOMATED COUNT: 14.7 % (ref 11.5–14.5)
EST. GFR  (NO RACE VARIABLE): 43.3 ML/MIN/1.73 M^2
ESTIMATED AVG GLUCOSE: 94 MG/DL (ref 68–131)
GLUCOSE SERPL-MCNC: 94 MG/DL (ref 70–110)
HBA1C MFR BLD: 4.9 % (ref 4–5.6)
HCT VFR BLD AUTO: 40.2 % (ref 37–48.5)
HDLC SERPL-MCNC: 31 MG/DL (ref 40–75)
HDLC SERPL: 28.2 % (ref 20–50)
HGB BLD-MCNC: 13.7 G/DL (ref 12–16)
IMM GRANULOCYTES # BLD AUTO: 0.02 K/UL (ref 0–0.04)
IMM GRANULOCYTES NFR BLD AUTO: 0.3 % (ref 0–0.5)
LDLC SERPL CALC-MCNC: 50.2 MG/DL (ref 63–159)
LYMPHOCYTES # BLD AUTO: 1.3 K/UL (ref 1–4.8)
LYMPHOCYTES NFR BLD: 17.5 % (ref 18–48)
MCH RBC QN AUTO: 30.5 PG (ref 27–31)
MCHC RBC AUTO-ENTMCNC: 34.1 G/DL (ref 32–36)
MCV RBC AUTO: 90 FL (ref 82–98)
MONOCYTES # BLD AUTO: 0.6 K/UL (ref 0.3–1)
MONOCYTES NFR BLD: 8.3 % (ref 4–15)
NEUTROPHILS # BLD AUTO: 5.2 K/UL (ref 1.8–7.7)
NEUTROPHILS NFR BLD: 70.3 % (ref 38–73)
NONHDLC SERPL-MCNC: 79 MG/DL
NRBC BLD-RTO: 0 /100 WBC
PLATELET # BLD AUTO: 205 K/UL (ref 150–450)
PMV BLD AUTO: 10.1 FL (ref 9.2–12.9)
POTASSIUM SERPL-SCNC: 3.6 MMOL/L (ref 3.5–5.1)
PROT SERPL-MCNC: 7.5 G/DL (ref 6–8.4)
RBC # BLD AUTO: 4.49 M/UL (ref 4–5.4)
SODIUM SERPL-SCNC: 140 MMOL/L (ref 136–145)
TRIGL SERPL-MCNC: 144 MG/DL (ref 30–150)
WBC # BLD AUTO: 7.33 K/UL (ref 3.9–12.7)

## 2023-12-20 PROCEDURE — 85025 COMPLETE CBC W/AUTO DIFF WBC: CPT | Performed by: INTERNAL MEDICINE

## 2023-12-20 PROCEDURE — 83036 HEMOGLOBIN GLYCOSYLATED A1C: CPT | Performed by: INTERNAL MEDICINE

## 2023-12-20 PROCEDURE — 80053 COMPREHEN METABOLIC PANEL: CPT | Performed by: INTERNAL MEDICINE

## 2023-12-20 PROCEDURE — 80061 LIPID PANEL: CPT | Performed by: INTERNAL MEDICINE

## 2023-12-20 PROCEDURE — 36415 COLL VENOUS BLD VENIPUNCTURE: CPT | Mod: PO | Performed by: INTERNAL MEDICINE

## 2023-12-29 PROBLEM — N18.32 STAGE 3B CHRONIC KIDNEY DISEASE: Status: ACTIVE | Noted: 2023-12-29

## 2023-12-29 PROBLEM — K76.6 PORTAL HYPERTENSION: Status: ACTIVE | Noted: 2023-12-29

## 2024-01-12 DIAGNOSIS — I48.0 PAF (PAROXYSMAL ATRIAL FIBRILLATION): Primary | ICD-10-CM

## 2024-01-16 PROBLEM — I25.118 CORONARY ARTERY DISEASE OF NATIVE ARTERY OF NATIVE HEART WITH STABLE ANGINA PECTORIS: Status: ACTIVE | Noted: 2024-01-16

## 2024-01-16 NOTE — PROGRESS NOTES
Ms. Alvarado is a patient of Dr. Hawley and was last seen in clinic 9/19/2023.      Subjective:   Patient ID:  Vicky Alvarado is a 58 y.o. female who presents for follow up of Atrial Fibrillation  .     HPI:    Ms. Alvarado is a 58 y.o. female with HTN, AF (PVI 7/2020, 1/2022), GIB, watchman here for follow up.    Background:    7/7/2020: 1/9/20, she had her upper teeth pulled for dentures and been on penicillin since that time. She was in SR. She takes atenolol 10 mg q.d. for hypertension. She underwent REENA/CV 1/29/2020. EF 40% in AF. PET Stress 3/2020 revealed no ischemia. Repeat echo in NSR showed EF 60%. She has had recurrence, once during carpal tunnel surgery, and several other times since.  Regarding her family history, her mother and brother both have atrial fibrillation. She is on elquis for CVA prophylaxis. CHADSVASC of at least 3. No bleeding issues with xarelto.      10/2020: PVI 7/20/2020. 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 She underwent emergent exploratory surgery by Dr Rose who ligated a branch of her left SFA. She had further wound issues requiring wash out and wound vac over the next few months. This has been a challenging recovery but she is nearing completion. No symptomatic or documented AF recurrence.     3/21: AF/RVR recurrence with ER visit. Flecainide 100 mg bid started 3/1/21. No recurrence since. Some PVCs    6/21: Her leg incision has healed. She has resumed work. Over the past month, she has developed more NAPIER.     9/21/21: HTN meds adjusted. Lasix started, hctz stopped, nifedipine started. Labs showed anemia. Endoscopy with no lesions. Respiratory evaluation performed without obvious culprit. LE edema worsened. She stopped flecainide with resolution of symptoms.  She has had some palpitations.     12/21: rythmol started for pAF. She feels that her AF has returned recently with some long sustained events. In SR today. She feels that the AF  is compromising her QOL and asks about repeat ablation.     4/22: Repeat ablation 1/22 with rare and brief, longest 1h. She has since developed  bleeding and is due for hysterectomy later this spring. Symptoms much improved.     10/2022: Pt stopped rhythmol but restarted 12/2022 due to recurrent AF symptoms.    4/11/2023: She had NAPIER leading to PET stress and then LHC. LHC with only distal, small vessel disease. EF normal 10/22. She has FE deficient anemia.   CHADSVASC of 4  HASBLED of 3  57 yoF here for AF management. She has no sustained arrhythmias since her last ablation. She has developed fatigue and Fe deficient anemia. I offered LAAO due to elevated bleeding and stroke risks. The patient can tolerate short term OAC but is not a candidate for long term OAC due to recurrent bleeding issues. I discussed management options regarding LAAO. I discussed the process of LAAO via Watchman including pre-procedure testing  as well as the need to take OAC for 6 weeks post implant. We discussed post procedure REENA studies to assess EMILIANO occlusion. Additionally I mentioned the need to take DAPT up to the 6 month point post implant.     2/2023: LHC:    The left main was normal  The LAD had luminal irregularity without obstructive disease  The proximal circumflex and large OMB were normal.  The distal circumflex supplying the distal OMB had 70-80% stenosis. This was a small to medium-sized vessel.  The RCA had luminal irregularity distally with a 50% stenosis before the PDA.    6/12/2023: The left atrial appendage closure was successful  with a DEVICE WATCHMAN FLX CLSR 27MM device.    7/24/2023: Small 2mm peridevice leak seen. Eliquis stopped. Patient discharged on DAPT.    9/19/2023: She is 3 moths s/p watchman implantation. REENA 7/24/2023 showed 2mm peridevice leak and eliquis was stopped and DAPT (ASA and plavix) started. She is doing well from a rhythm standpoint, with no recent sustained palpitations of propafenone.  Unfortunately, she has developed RBBB. She also has C confirmed CAD (see cath report 2/2023). Case previously discussed with Dr. Hawley. Will stop propafenone  - defer restarting AAD. If AF recurs, will update ECG off propafenone to determine most appropriate AAD therapy. Patient to continue DAPT therapy until 6 months post-implant.     Update (01/17/2024):    Today she says she has some good days and some bad days. No sustained palpitations since last clinic visit. Still has irregular HBs. Chronic NAPIER/SOB. No CP, LH, syncope reported.  On ASA and plavix (7 mo s/pwatchman). On metoprolol 100 AM and 50mg PM.  Following with bariatrics. Now on Mounjaro.  Using CPAP nightly.      I have personally reviewed the patient's EKG today, which shows sinus rhythm with RBBB and PACs at 87bpm. SC interval is 140. QRS is 136. QTc is 529.    Relevant Cardiac Test Results:    REENA (7/24/2023):  S/p 27 mm Watchman FLX implanted with 22 % compression. Minor 2mm peridevice leak seen  Normal systolic function.  The estimated ejection fraction is 55%.  EMILIANO occluder is present.  Iatrogenic ASD seen with left to right shunting  Right atrial enlargement.    Current Outpatient Medications   Medication Sig    acetaminophen (TYLENOL) 500 MG tablet Take 2 tablets (1,000 mg total) by mouth 2 (two) times daily as needed for Pain. Begin post op day 3.    albuterol (VENTOLIN HFA) 90 mcg/actuation inhaler Inhale 2 puffs into the lungs every 6 (six) hours as needed for Wheezing. Rescue    ALPRAZolam (XANAX) 0.5 MG tablet Take 1 tablet (0.5 mg total) by mouth 2 (two) times daily as needed for Anxiety.    aspirin (ECOTRIN) 81 MG EC tablet Take 81 mg by mouth once daily.    atorvastatin (LIPITOR) 80 MG tablet Take 1 tablet (80 mg total) by mouth once daily.    b complex vitamins capsule Take 1 capsule by mouth once daily.    clopidogreL (PLAVIX) 75 mg tablet Take 1 tablet (75 mg total) by mouth once daily.    colchicine (COLCRYS) 0.6 mg tablet For  gout attack: Take TWO tablets by mouth, then, 2 hours later, take ONE additional tablet. Then take 1 tablet twice daily only if still needed for gout pain.    DULoxetine (CYMBALTA) 60 MG capsule Take 2 capsules (120 mg total) by mouth once daily.    furosemide (LASIX) 40 MG tablet Take 1 tablet (40 mg total) by mouth 2 (two) times daily. Resume as needed for edema until followup with your PCP.    gluc-shikha-[facility] [place]5-H-wijq-reymundo-bor 750 mg-644 mg- 30 mg-1 mg Tab Take 1 tablet by mouth once daily.     guaiFENesin-codeine 100-10 mg/5 ml (TUSSI-ORGANIDIN NR)  mg/5 mL syrup Take 5 mLs by mouth 3 (three) times daily as needed for Cough.    HYDROcodone-acetaminophen (NORCO) 5-325 mg per tablet Take 1 tablet by mouth every 6 (six) hours as needed for Pain.    ibuprofen (ADVIL,MOTRIN) 600 MG tablet Take 1 tablet (600 mg total) by mouth 3 (three) times daily as needed for Pain. PO delivery    krill/om-3/dha/epa/phospho/ast (MEGARED OMEGA-3 KRILL OIL ORAL) Take 1 capsule by mouth once daily.    lisinopriL (PRINIVIL,ZESTRIL) 40 MG tablet Take 1 tablet (40 mg total) by mouth once daily.    metoprolol succinate (TOPROL-XL) 50 MG 24 hr tablet Take 4 tablets (200 mg total) by mouth once daily.    multivitamin (THERAGRAN) per tablet Take 1 tablet by mouth once daily.    NIFEdipine (PROCARDIA-XL) 60 MG (OSM) 24 hr tablet Take 1 tablet (60 mg total) by mouth once daily.    omeprazole (PRILOSEC) 20 MG capsule TAKE 1 CAPSULE BY MOUTH ONCE DAILY    potassium chloride SA (K-DUR,KLOR-CON) 20 MEQ tablet Take 1 tablet (20 mEq total) by mouth once daily. ONLY TAKE WITH LASIX.    predniSONE (DELTASONE) 10 MG tablet Take 1 tablet by mouth daily    SENNA 8.6 mg tablet Take 1 tablet by mouth 2 (two) times daily. (Patient taking differently: Take 1 tablet by mouth 2 (two) times daily. prn)    tirzepatide (MOUNJARO) 12.5 mg/0.5 mL PnIj Inject 12.5 mg into the skin every 7 days.    traMADoL (ULTRAM) 50 mg tablet Take 1 tablet (50 mg total) by mouth  "every 6 (six) hours as needed for Pain.    traZODone (DESYREL) 100 MG tablet Take 1 tablet (100 mg total) by mouth nightly as needed for Insomnia.     No current facility-administered medications for this visit.     Facility-Administered Medications Ordered in Other Visits   Medication    mupirocin 2 % ointment       Review of Systems   Constitutional: Positive for malaise/fatigue.   Cardiovascular:  Positive for dyspnea on exertion, irregular heartbeat and palpitations. Negative for chest pain and leg swelling.   Respiratory:  Positive for shortness of breath.    Hematologic/Lymphatic: Negative for bleeding problem.   Skin:  Negative for rash.   Musculoskeletal:  Negative for myalgias.   Gastrointestinal:  Negative for hematemesis, hematochezia and nausea.   Genitourinary:  Negative for hematuria.   Neurological:  Negative for light-headedness.   Psychiatric/Behavioral:  Negative for altered mental status.    Allergic/Immunologic: Negative for persistent infections.       Objective:          /70   Pulse 87   Ht 5' 3" (1.6 m)   Wt 117.7 kg (259 lb 7.7 oz)   LMP 03/25/2022 (Exact Date)   BMI 45.97 kg/m²     Physical Exam  Vitals and nursing note reviewed.   Constitutional:       Appearance: Normal appearance. She is well-developed.   HENT:      Head: Normocephalic.      Nose: Nose normal.   Eyes:      Pupils: Pupils are equal, round, and reactive to light.   Cardiovascular:      Rate and Rhythm: Normal rate. Rhythm irregular. Frequent Extrasystoles are present.  Pulmonary:      Effort: No respiratory distress.      Breath sounds: Normal breath sounds.   Musculoskeletal:         General: Normal range of motion.   Skin:     General: Skin is warm and dry.      Findings: No erythema.   Neurological:      Mental Status: She is alert and oriented to person, place, and time.   Psychiatric:         Speech: Speech normal.         Behavior: Behavior normal.           Lab Results   Component Value Date     " 12/20/2023    K 3.6 12/20/2023    MG 1.9 08/25/2023    BUN 12 12/20/2023    CREATININE 1.4 12/20/2023    ALT 11 12/20/2023    AST 28 12/20/2023    HGB 13.7 12/20/2023    HCT 40.2 12/20/2023    HCT 28 (L) 08/11/2022    TSH 1.708 10/10/2022    LDLCALC 50.2 (L) 12/20/2023       Recent Labs   Lab 02/07/23  0915 04/18/23  1544 06/09/23  1444 06/12/23  0641   INR 1.1 1.2 1.1 1.1       Assessment:     1. Coronary artery disease of native artery of native heart with stable angina pectoris    2. PAF (paroxysmal atrial fibrillation)    3. Essential hypertension    4. ERENDIRA (obstructive sleep apnea)    5. Atrial fibrillation, unspecified type      Plan:     In summary, Ms. Alvarado is a 58 y.o. female with HTN, AF (PVI 7/2020, 1/2022), GIBjami here for follow up.  At last clinic visit, propafenone was stopped due to RBBB. No sustained palpitations since stopping AAD but does continue to experience irrg HB c/w frequent PACs. Will increase metoprolol. If symptoms persist, can switch nifedipine to diltiazem. If AAD needed, consider multaq due to persistent RBBB. She is 6 mo on DAPT s/p watchman- ok  to stop plavix at this time. Continue ASA indefinitely.     Increase metoprolol to 100mg BID  Stop plavix. Continue ASA  Continue other meds  RTC 6 mo, sooner if needed      *A copy of this note has been sent to Dr. Hawley*    Follow up in about 6 months (around 7/17/2024).    ------------------------------------------------------------------    SCOUT Payton, NP-C  Cardiac Electrophysiology

## 2024-01-17 ENCOUNTER — OFFICE VISIT (OUTPATIENT)
Dept: ELECTROPHYSIOLOGY | Facility: CLINIC | Age: 59
End: 2024-01-17
Payer: COMMERCIAL

## 2024-01-17 ENCOUNTER — HOSPITAL ENCOUNTER (OUTPATIENT)
Dept: CARDIOLOGY | Facility: CLINIC | Age: 59
Discharge: HOME | End: 2024-01-17
Payer: COMMERCIAL

## 2024-01-17 VITALS
DIASTOLIC BLOOD PRESSURE: 70 MMHG | BODY MASS INDEX: 45.98 KG/M2 | SYSTOLIC BLOOD PRESSURE: 121 MMHG | HEIGHT: 63 IN | HEART RATE: 87 BPM | WEIGHT: 259.5 LBS

## 2024-01-17 DIAGNOSIS — I48.91 ATRIAL FIBRILLATION, UNSPECIFIED TYPE: ICD-10-CM

## 2024-01-17 DIAGNOSIS — I25.118 CORONARY ARTERY DISEASE OF NATIVE ARTERY OF NATIVE HEART WITH STABLE ANGINA PECTORIS: Primary | ICD-10-CM

## 2024-01-17 DIAGNOSIS — I48.0 PAF (PAROXYSMAL ATRIAL FIBRILLATION): ICD-10-CM

## 2024-01-17 DIAGNOSIS — G47.33 OSA (OBSTRUCTIVE SLEEP APNEA): ICD-10-CM

## 2024-01-17 DIAGNOSIS — I10 ESSENTIAL HYPERTENSION: ICD-10-CM

## 2024-01-17 PROCEDURE — 3074F SYST BP LT 130 MM HG: CPT | Mod: CPTII,S$GLB,, | Performed by: NURSE PRACTITIONER

## 2024-01-17 PROCEDURE — 1159F MED LIST DOCD IN RCRD: CPT | Mod: CPTII,S$GLB,, | Performed by: NURSE PRACTITIONER

## 2024-01-17 PROCEDURE — 93005 ELECTROCARDIOGRAM TRACING: CPT | Mod: S$GLB,,, | Performed by: INTERNAL MEDICINE

## 2024-01-17 PROCEDURE — 3078F DIAST BP <80 MM HG: CPT | Mod: CPTII,S$GLB,, | Performed by: NURSE PRACTITIONER

## 2024-01-17 PROCEDURE — 99999 PR PBB SHADOW E&M-EST. PATIENT-LVL III: CPT | Mod: PBBFAC,,, | Performed by: NURSE PRACTITIONER

## 2024-01-17 PROCEDURE — 93010 ELECTROCARDIOGRAM REPORT: CPT | Mod: S$GLB,,, | Performed by: INTERNAL MEDICINE

## 2024-01-17 PROCEDURE — 3008F BODY MASS INDEX DOCD: CPT | Mod: CPTII,S$GLB,, | Performed by: NURSE PRACTITIONER

## 2024-01-17 PROCEDURE — 99214 OFFICE O/P EST MOD 30 MIN: CPT | Mod: S$GLB,,, | Performed by: NURSE PRACTITIONER

## 2024-01-17 PROCEDURE — 1160F RVW MEDS BY RX/DR IN RCRD: CPT | Mod: CPTII,S$GLB,, | Performed by: NURSE PRACTITIONER

## 2024-01-17 RX ORDER — METOPROLOL SUCCINATE 100 MG/1
100 TABLET, EXTENDED RELEASE ORAL 2 TIMES DAILY
Qty: 180 TABLET | Refills: 3 | Status: SHIPPED | OUTPATIENT
Start: 2024-01-17

## 2024-02-12 NOTE — Clinical Note
That will be a sleep referral.  Sent.  Jake Silva MD     The catheter insertion attempt was made into the ostium   left main.

## 2024-07-26 ENCOUNTER — HOSPITAL ENCOUNTER (INPATIENT)
Facility: HOSPITAL | Age: 59
LOS: 17 days | Discharge: NURSING, REHAB OR LONG-TERM CARE | DRG: 286 | End: 2024-08-13
Attending: EMERGENCY MEDICINE | Admitting: EMERGENCY MEDICINE
Payer: MEDICAID

## 2024-07-26 DIAGNOSIS — R06.02 SHORTNESS OF BREATH: ICD-10-CM

## 2024-07-26 DIAGNOSIS — R00.0 TACHYCARDIA: ICD-10-CM

## 2024-07-26 DIAGNOSIS — R60.1 ANASARCA: ICD-10-CM

## 2024-07-26 DIAGNOSIS — R07.9 CHEST PAIN: ICD-10-CM

## 2024-07-26 DIAGNOSIS — I50.9 ACUTE ON CHRONIC HEART FAILURE, UNSPECIFIED HEART FAILURE TYPE: Primary | ICD-10-CM

## 2024-07-26 DIAGNOSIS — K76.0 NAFLD (NONALCOHOLIC FATTY LIVER DISEASE): Chronic | ICD-10-CM

## 2024-07-26 DIAGNOSIS — K76.6 PORTAL HYPERTENSION: ICD-10-CM

## 2024-07-26 DIAGNOSIS — M92.212: ICD-10-CM

## 2024-07-26 DIAGNOSIS — I50.30 (HFPEF) HEART FAILURE WITH PRESERVED EJECTION FRACTION: ICD-10-CM

## 2024-07-26 DIAGNOSIS — I50.9 CHF (CONGESTIVE HEART FAILURE): ICD-10-CM

## 2024-07-26 DIAGNOSIS — F41.9 ANXIETY: ICD-10-CM

## 2024-07-26 DIAGNOSIS — F32.A DEPRESSION, UNSPECIFIED DEPRESSION TYPE: ICD-10-CM

## 2024-07-26 DIAGNOSIS — N18.32 STAGE 3B CHRONIC KIDNEY DISEASE: ICD-10-CM

## 2024-07-26 LAB
ALBUMIN SERPL BCP-MCNC: 2.7 G/DL (ref 3.5–5.2)
ALLENS TEST: ABNORMAL
ALP SERPL-CCNC: 114 U/L (ref 55–135)
ALT SERPL W/O P-5'-P-CCNC: <5 U/L (ref 10–44)
ANION GAP SERPL CALC-SCNC: 14 MMOL/L (ref 8–16)
AST SERPL-CCNC: 13 U/L (ref 10–40)
BASOPHILS # BLD AUTO: 0.06 K/UL (ref 0–0.2)
BASOPHILS NFR BLD: 1.1 % (ref 0–1.9)
BILIRUB SERPL-MCNC: 0.7 MG/DL (ref 0.1–1)
BNP SERPL-MCNC: 410 PG/ML (ref 0–99)
BUN SERPL-MCNC: 27 MG/DL (ref 6–20)
BUN SERPL-MCNC: 29 MG/DL (ref 6–30)
CALCIUM SERPL-MCNC: 8.8 MG/DL (ref 8.7–10.5)
CHLORIDE SERPL-SCNC: 100 MMOL/L (ref 95–110)
CHLORIDE SERPL-SCNC: 101 MMOL/L (ref 95–110)
CO2 SERPL-SCNC: 25 MMOL/L (ref 23–29)
CREAT SERPL-MCNC: 1.8 MG/DL (ref 0.5–1.4)
CREAT SERPL-MCNC: 1.9 MG/DL (ref 0.5–1.4)
DIFFERENTIAL METHOD BLD: ABNORMAL
EOSINOPHIL # BLD AUTO: 0.2 K/UL (ref 0–0.5)
EOSINOPHIL NFR BLD: 3.6 % (ref 0–8)
ERYTHROCYTE [DISTWIDTH] IN BLOOD BY AUTOMATED COUNT: 14.5 % (ref 11.5–14.5)
EST. GFR  (NO RACE VARIABLE): 31.9 ML/MIN/1.73 M^2
GLUCOSE SERPL-MCNC: 101 MG/DL (ref 70–110)
GLUCOSE SERPL-MCNC: 102 MG/DL (ref 70–110)
HCO3 UR-SCNC: 28.8 MMOL/L (ref 24–28)
HCT VFR BLD AUTO: 33.8 % (ref 37–48.5)
HCT VFR BLD CALC: 34 %PCV (ref 36–54)
HGB BLD-MCNC: 10.6 G/DL (ref 12–16)
IMM GRANULOCYTES # BLD AUTO: 0.02 K/UL (ref 0–0.04)
IMM GRANULOCYTES NFR BLD AUTO: 0.4 % (ref 0–0.5)
LYMPHOCYTES # BLD AUTO: 0.6 K/UL (ref 1–4.8)
LYMPHOCYTES NFR BLD: 11.2 % (ref 18–48)
MAGNESIUM SERPL-MCNC: 1.5 MG/DL (ref 1.6–2.6)
MCH RBC QN AUTO: 28.5 PG (ref 27–31)
MCHC RBC AUTO-ENTMCNC: 31.4 G/DL (ref 32–36)
MCV RBC AUTO: 91 FL (ref 82–98)
MONOCYTES # BLD AUTO: 0.6 K/UL (ref 0.3–1)
MONOCYTES NFR BLD: 11.2 % (ref 4–15)
NEUTROPHILS # BLD AUTO: 3.9 K/UL (ref 1.8–7.7)
NEUTROPHILS NFR BLD: 72.5 % (ref 38–73)
NRBC BLD-RTO: 0 /100 WBC
PCO2 BLDA: 41.9 MMHG (ref 35–45)
PH SMN: 7.45 [PH] (ref 7.35–7.45)
PLATELET # BLD AUTO: 186 K/UL (ref 150–450)
PMV BLD AUTO: 10 FL (ref 9.2–12.9)
PO2 BLDA: 178 MMHG (ref 40–60)
POC BE: 5 MMOL/L
POC IONIZED CALCIUM: 1.04 MMOL/L (ref 1.06–1.42)
POC SATURATED O2: 100 % (ref 95–100)
POC TCO2 (MEASURED): 26 MMOL/L (ref 23–29)
POC TCO2: 30 MMOL/L (ref 24–29)
POTASSIUM BLD-SCNC: 2.9 MMOL/L (ref 3.5–5.1)
POTASSIUM SERPL-SCNC: 2.9 MMOL/L (ref 3.5–5.1)
PROT SERPL-MCNC: 7.1 G/DL (ref 6–8.4)
RBC # BLD AUTO: 3.72 M/UL (ref 4–5.4)
SAMPLE: ABNORMAL
SAMPLE: ABNORMAL
SITE: ABNORMAL
SODIUM BLD-SCNC: 139 MMOL/L (ref 136–145)
SODIUM SERPL-SCNC: 140 MMOL/L (ref 136–145)
TROPONIN I SERPL DL<=0.01 NG/ML-MCNC: <0.006 NG/ML (ref 0–0.03)
WBC # BLD AUTO: 5.35 K/UL (ref 3.9–12.7)

## 2024-07-26 PROCEDURE — 27100171 HC OXYGEN HIGH FLOW UP TO 24 HOURS

## 2024-07-26 PROCEDURE — 0FB03ZX EXCISION OF LIVER, PERCUTANEOUS APPROACH, DIAGNOSTIC: ICD-10-PCS | Performed by: RADIOLOGY

## 2024-07-26 PROCEDURE — 94761 N-INVAS EAR/PLS OXIMETRY MLT: CPT

## 2024-07-26 PROCEDURE — 83735 ASSAY OF MAGNESIUM: CPT

## 2024-07-26 PROCEDURE — 27000190 HC CPAP FULL FACE MASK W/VALVE

## 2024-07-26 PROCEDURE — 84484 ASSAY OF TROPONIN QUANT: CPT

## 2024-07-26 PROCEDURE — 83880 ASSAY OF NATRIURETIC PEPTIDE: CPT

## 2024-07-26 PROCEDURE — 99900035 HC TECH TIME PER 15 MIN (STAT)

## 2024-07-26 PROCEDURE — 5A09557 ASSISTANCE WITH RESPIRATORY VENTILATION, GREATER THAN 96 CONSECUTIVE HOURS, CONTINUOUS POSITIVE AIRWAY PRESSURE: ICD-10-PCS | Performed by: INTERNAL MEDICINE

## 2024-07-26 PROCEDURE — 93005 ELECTROCARDIOGRAM TRACING: CPT

## 2024-07-26 PROCEDURE — 82803 BLOOD GASES ANY COMBINATION: CPT

## 2024-07-26 PROCEDURE — 93010 ELECTROCARDIOGRAM REPORT: CPT | Mod: ,,, | Performed by: INTERNAL MEDICINE

## 2024-07-26 PROCEDURE — 85025 COMPLETE CBC W/AUTO DIFF WBC: CPT

## 2024-07-26 PROCEDURE — 94660 CPAP INITIATION&MGMT: CPT

## 2024-07-26 PROCEDURE — 27000221 HC OXYGEN, UP TO 24 HOURS

## 2024-07-26 PROCEDURE — 99291 CRITICAL CARE FIRST HOUR: CPT

## 2024-07-26 PROCEDURE — 80053 COMPREHEN METABOLIC PANEL: CPT

## 2024-07-26 RX ORDER — MAGNESIUM SULFATE HEPTAHYDRATE 40 MG/ML
2 INJECTION, SOLUTION INTRAVENOUS ONCE
Status: DISCONTINUED | OUTPATIENT
Start: 2024-07-27 | End: 2024-07-27

## 2024-07-26 RX ORDER — FUROSEMIDE 10 MG/ML
40 INJECTION INTRAMUSCULAR; INTRAVENOUS
Status: COMPLETED | OUTPATIENT
Start: 2024-07-26 | End: 2024-07-27

## 2024-07-26 RX ORDER — FUROSEMIDE 10 MG/ML
60 INJECTION INTRAMUSCULAR; INTRAVENOUS
Status: COMPLETED | OUTPATIENT
Start: 2024-07-26 | End: 2024-07-27

## 2024-07-26 RX ORDER — POTASSIUM CHLORIDE 20 MEQ/1
40 TABLET, EXTENDED RELEASE ORAL ONCE
Status: DISCONTINUED | OUTPATIENT
Start: 2024-07-27 | End: 2024-07-27

## 2024-07-26 NOTE — Clinical Note
The PA catheter was inserted into the superior vena cava. Hemodynamics were performed. O2 saturation was measured at 61%.

## 2024-07-26 NOTE — Clinical Note
The PA catheter was repositioned to the right atrium. Hemodynamics were performed. O2 saturation was measured at 59%.

## 2024-07-26 NOTE — Clinical Note
The PA catheter was repositioned to the main pulmonary artery. Hemodynamics were performed. O2 saturation was measured at 55%.

## 2024-07-27 PROBLEM — I50.33 ACUTE ON CHRONIC DIASTOLIC HEART FAILURE: Status: ACTIVE | Noted: 2024-07-27

## 2024-07-27 PROBLEM — R60.1 ANASARCA: Status: ACTIVE | Noted: 2024-07-27

## 2024-07-27 LAB
ALBUMIN FLD-MCNC: 1.7 G/DL
ALBUMIN SERPL BCP-MCNC: 2.7 G/DL (ref 3.5–5.2)
ALP SERPL-CCNC: 114 U/L (ref 55–135)
ALT SERPL W/O P-5'-P-CCNC: 5 U/L (ref 10–44)
ANION GAP SERPL CALC-SCNC: 10 MMOL/L (ref 8–16)
ANION GAP SERPL CALC-SCNC: 12 MMOL/L (ref 8–16)
ANION GAP SERPL CALC-SCNC: 9 MMOL/L (ref 8–16)
APPEARANCE FLD: CLEAR
AST SERPL-CCNC: 13 U/L (ref 10–40)
BASOPHILS # BLD AUTO: 0.06 K/UL (ref 0–0.2)
BASOPHILS NFR BLD: 1.4 % (ref 0–1.9)
BILIRUB SERPL-MCNC: 0.6 MG/DL (ref 0.1–1)
BODY FLD TYPE: NORMAL
BODY FLUID SOURCE, LDH: NORMAL
BUN SERPL-MCNC: 23 MG/DL (ref 6–20)
BUN SERPL-MCNC: 24 MG/DL (ref 6–20)
BUN SERPL-MCNC: 27 MG/DL (ref 6–20)
CALCIUM SERPL-MCNC: 8.6 MG/DL (ref 8.7–10.5)
CALCIUM SERPL-MCNC: 8.7 MG/DL (ref 8.7–10.5)
CALCIUM SERPL-MCNC: 8.7 MG/DL (ref 8.7–10.5)
CHLORIDE SERPL-SCNC: 101 MMOL/L (ref 95–110)
CHLORIDE SERPL-SCNC: 102 MMOL/L (ref 95–110)
CHLORIDE SERPL-SCNC: 104 MMOL/L (ref 95–110)
CO2 SERPL-SCNC: 23 MMOL/L (ref 23–29)
CO2 SERPL-SCNC: 30 MMOL/L (ref 23–29)
CO2 SERPL-SCNC: 30 MMOL/L (ref 23–29)
COLOR FLD: YELLOW
CREAT SERPL-MCNC: 1.5 MG/DL (ref 0.5–1.4)
CREAT SERPL-MCNC: 1.5 MG/DL (ref 0.5–1.4)
CREAT SERPL-MCNC: 1.7 MG/DL (ref 0.5–1.4)
DIFFERENTIAL METHOD BLD: ABNORMAL
EOSINOPHIL # BLD AUTO: 0.2 K/UL (ref 0–0.5)
EOSINOPHIL NFR BLD: 3.9 % (ref 0–8)
ERYTHROCYTE [DISTWIDTH] IN BLOOD BY AUTOMATED COUNT: 14.6 % (ref 11.5–14.5)
EST. GFR  (NO RACE VARIABLE): 34.1 ML/MIN/1.73 M^2
EST. GFR  (NO RACE VARIABLE): 39.6 ML/MIN/1.73 M^2
EST. GFR  (NO RACE VARIABLE): 39.6 ML/MIN/1.73 M^2
ESTIMATED AVG GLUCOSE: 94 MG/DL (ref 68–131)
FERRITIN SERPL-MCNC: 151 NG/ML (ref 20–300)
GLUCOSE SERPL-MCNC: 88 MG/DL (ref 70–110)
GLUCOSE SERPL-MCNC: 92 MG/DL (ref 70–110)
GLUCOSE SERPL-MCNC: 96 MG/DL (ref 70–110)
GRAM STN SPEC: NORMAL
GRAM STN SPEC: NORMAL
HBA1C MFR BLD: 4.9 % (ref 4–5.6)
HCT VFR BLD AUTO: 34.1 % (ref 37–48.5)
HGB BLD-MCNC: 10.2 G/DL (ref 12–16)
IMM GRANULOCYTES # BLD AUTO: 0.02 K/UL (ref 0–0.04)
IMM GRANULOCYTES NFR BLD AUTO: 0.5 % (ref 0–0.5)
INR PPP: 1.1 (ref 0.8–1.2)
IRON SERPL-MCNC: 39 UG/DL (ref 30–160)
LDH FLD L TO P-CCNC: 75 U/L
LYMPHOCYTES # BLD AUTO: 0.7 K/UL (ref 1–4.8)
LYMPHOCYTES NFR BLD: 15 % (ref 18–48)
LYMPHOCYTES NFR FLD MANUAL: 63 %
MAGNESIUM SERPL-MCNC: 1.8 MG/DL (ref 1.6–2.6)
MCH RBC QN AUTO: 27.7 PG (ref 27–31)
MCHC RBC AUTO-ENTMCNC: 29.9 G/DL (ref 32–36)
MCV RBC AUTO: 93 FL (ref 82–98)
MONOCYTES # BLD AUTO: 0.6 K/UL (ref 0.3–1)
MONOCYTES NFR BLD: 12.9 % (ref 4–15)
MONOS+MACROS NFR FLD MANUAL: 31 %
NEUTROPHILS # BLD AUTO: 2.9 K/UL (ref 1.8–7.7)
NEUTROPHILS NFR BLD: 66.3 % (ref 38–73)
NEUTROPHILS NFR FLD MANUAL: 6 %
NRBC BLD-RTO: 0 /100 WBC
OHS QRS DURATION: 122 MS
OHS QTC CALCULATION: 484 MS
PHOSPHATE SERPL-MCNC: 3.3 MG/DL (ref 2.7–4.5)
PLATELET # BLD AUTO: 174 K/UL (ref 150–450)
PMV BLD AUTO: 9.5 FL (ref 9.2–12.9)
POTASSIUM SERPL-SCNC: 2.8 MMOL/L (ref 3.5–5.1)
POTASSIUM SERPL-SCNC: 3.5 MMOL/L (ref 3.5–5.1)
POTASSIUM SERPL-SCNC: 3.7 MMOL/L (ref 3.5–5.1)
PROT FLD-MCNC: 3.2 G/DL
PROT SERPL-MCNC: 7 G/DL (ref 6–8.4)
PROTHROMBIN TIME: 11.6 SEC (ref 9–12.5)
RBC # BLD AUTO: 3.68 M/UL (ref 4–5.4)
SATURATED IRON: 14 % (ref 20–50)
SODIUM SERPL-SCNC: 139 MMOL/L (ref 136–145)
SODIUM SERPL-SCNC: 141 MMOL/L (ref 136–145)
SODIUM SERPL-SCNC: 141 MMOL/L (ref 136–145)
SPECIMEN SOURCE: NORMAL
SPECIMEN SOURCE: NORMAL
TOTAL IRON BINDING CAPACITY: 269 UG/DL (ref 250–450)
TRANSFERRIN SERPL-MCNC: 182 MG/DL (ref 200–375)
WBC # BLD AUTO: 4.41 K/UL (ref 3.9–12.7)
WBC # FLD: 50 /CU MM

## 2024-07-27 PROCEDURE — 80048 BASIC METABOLIC PNL TOTAL CA: CPT | Mod: XB

## 2024-07-27 PROCEDURE — 25000003 PHARM REV CODE 250

## 2024-07-27 PROCEDURE — 87070 CULTURE OTHR SPECIMN AEROBIC: CPT

## 2024-07-27 PROCEDURE — 80053 COMPREHEN METABOLIC PANEL: CPT

## 2024-07-27 PROCEDURE — 83735 ASSAY OF MAGNESIUM: CPT

## 2024-07-27 PROCEDURE — 96376 TX/PRO/DX INJ SAME DRUG ADON: CPT

## 2024-07-27 PROCEDURE — 63600175 PHARM REV CODE 636 W HCPCS: Performed by: EMERGENCY MEDICINE

## 2024-07-27 PROCEDURE — 82728 ASSAY OF FERRITIN: CPT

## 2024-07-27 PROCEDURE — 96375 TX/PRO/DX INJ NEW DRUG ADDON: CPT

## 2024-07-27 PROCEDURE — 63600175 PHARM REV CODE 636 W HCPCS

## 2024-07-27 PROCEDURE — 85025 COMPLETE CBC W/AUTO DIFF WBC: CPT

## 2024-07-27 PROCEDURE — 85610 PROTHROMBIN TIME: CPT

## 2024-07-27 PROCEDURE — 94660 CPAP INITIATION&MGMT: CPT

## 2024-07-27 PROCEDURE — 83540 ASSAY OF IRON: CPT

## 2024-07-27 PROCEDURE — 99900035 HC TECH TIME PER 15 MIN (STAT)

## 2024-07-27 PROCEDURE — 25000003 PHARM REV CODE 250: Performed by: EMERGENCY MEDICINE

## 2024-07-27 PROCEDURE — 87075 CULTR BACTERIA EXCEPT BLOOD: CPT

## 2024-07-27 PROCEDURE — 0W9G3ZZ DRAINAGE OF PERITONEAL CAVITY, PERCUTANEOUS APPROACH: ICD-10-PCS | Performed by: HOSPITALIST

## 2024-07-27 PROCEDURE — 96374 THER/PROPH/DIAG INJ IV PUSH: CPT | Mod: 59

## 2024-07-27 PROCEDURE — 89051 BODY FLUID CELL COUNT: CPT

## 2024-07-27 PROCEDURE — 82042 OTHER SOURCE ALBUMIN QUAN EA: CPT

## 2024-07-27 PROCEDURE — 20600001 HC STEP DOWN PRIVATE ROOM

## 2024-07-27 PROCEDURE — 87205 SMEAR GRAM STAIN: CPT

## 2024-07-27 PROCEDURE — 94761 N-INVAS EAR/PLS OXIMETRY MLT: CPT

## 2024-07-27 PROCEDURE — 84157 ASSAY OF PROTEIN OTHER: CPT

## 2024-07-27 PROCEDURE — 83615 LACTATE (LD) (LDH) ENZYME: CPT

## 2024-07-27 PROCEDURE — 84100 ASSAY OF PHOSPHORUS: CPT

## 2024-07-27 PROCEDURE — 96365 THER/PROPH/DIAG IV INF INIT: CPT

## 2024-07-27 PROCEDURE — 27100171 HC OXYGEN HIGH FLOW UP TO 24 HOURS

## 2024-07-27 PROCEDURE — 83036 HEMOGLOBIN GLYCOSYLATED A1C: CPT

## 2024-07-27 PROCEDURE — 96366 THER/PROPH/DIAG IV INF ADDON: CPT

## 2024-07-27 RX ORDER — TALC
6 POWDER (GRAM) TOPICAL NIGHTLY PRN
Status: DISCONTINUED | OUTPATIENT
Start: 2024-07-27 | End: 2024-07-31

## 2024-07-27 RX ORDER — GLUCAGON 1 MG
1 KIT INJECTION
Status: DISCONTINUED | OUTPATIENT
Start: 2024-07-27 | End: 2024-08-13 | Stop reason: HOSPADM

## 2024-07-27 RX ORDER — IBUPROFEN 200 MG
24 TABLET ORAL
Status: DISCONTINUED | OUTPATIENT
Start: 2024-07-27 | End: 2024-08-13 | Stop reason: HOSPADM

## 2024-07-27 RX ORDER — MAGNESIUM SULFATE HEPTAHYDRATE 40 MG/ML
2 INJECTION, SOLUTION INTRAVENOUS ONCE
Status: COMPLETED | OUTPATIENT
Start: 2024-07-27 | End: 2024-07-27

## 2024-07-27 RX ORDER — ONDANSETRON 8 MG/1
8 TABLET, ORALLY DISINTEGRATING ORAL EVERY 8 HOURS PRN
Status: DISCONTINUED | OUTPATIENT
Start: 2024-07-27 | End: 2024-08-13 | Stop reason: HOSPADM

## 2024-07-27 RX ORDER — MORPHINE SULFATE 2 MG/ML
2 INJECTION, SOLUTION INTRAMUSCULAR; INTRAVENOUS
Status: COMPLETED | OUTPATIENT
Start: 2024-07-27 | End: 2024-07-27

## 2024-07-27 RX ORDER — MORPHINE SULFATE 4 MG/ML
4 INJECTION, SOLUTION INTRAMUSCULAR; INTRAVENOUS
Status: DISCONTINUED | OUTPATIENT
Start: 2024-07-27 | End: 2024-07-27

## 2024-07-27 RX ORDER — DULOXETIN HYDROCHLORIDE 30 MG/1
120 CAPSULE, DELAYED RELEASE ORAL DAILY
Status: DISCONTINUED | OUTPATIENT
Start: 2024-07-27 | End: 2024-07-31

## 2024-07-27 RX ORDER — IBUPROFEN 200 MG
16 TABLET ORAL
Status: DISCONTINUED | OUTPATIENT
Start: 2024-07-27 | End: 2024-08-13 | Stop reason: HOSPADM

## 2024-07-27 RX ORDER — ALBUMIN HUMAN 250 G/1000ML
37.5 SOLUTION INTRAVENOUS ONCE AS NEEDED
Status: DISCONTINUED | OUTPATIENT
Start: 2024-07-27 | End: 2024-07-29

## 2024-07-27 RX ORDER — ALPRAZOLAM 0.25 MG/1
0.25 TABLET ORAL DAILY PRN
Status: DISCONTINUED | OUTPATIENT
Start: 2024-07-27 | End: 2024-08-09

## 2024-07-27 RX ORDER — FUROSEMIDE 10 MG/ML
80 INJECTION INTRAMUSCULAR; INTRAVENOUS 3 TIMES DAILY
Status: DISCONTINUED | OUTPATIENT
Start: 2024-07-27 | End: 2024-07-29

## 2024-07-27 RX ORDER — NALOXONE HCL 0.4 MG/ML
0.02 VIAL (ML) INJECTION
Status: DISCONTINUED | OUTPATIENT
Start: 2024-07-27 | End: 2024-08-13 | Stop reason: HOSPADM

## 2024-07-27 RX ORDER — ACETAMINOPHEN 500 MG
1000 TABLET ORAL
Status: COMPLETED | OUTPATIENT
Start: 2024-07-27 | End: 2024-07-27

## 2024-07-27 RX ORDER — ACETAMINOPHEN 325 MG/1
650 TABLET ORAL EVERY 4 HOURS PRN
Status: DISCONTINUED | OUTPATIENT
Start: 2024-07-27 | End: 2024-08-13 | Stop reason: HOSPADM

## 2024-07-27 RX ORDER — PROCHLORPERAZINE EDISYLATE 5 MG/ML
5 INJECTION INTRAMUSCULAR; INTRAVENOUS EVERY 6 HOURS PRN
Status: DISCONTINUED | OUTPATIENT
Start: 2024-07-27 | End: 2024-08-13 | Stop reason: HOSPADM

## 2024-07-27 RX ORDER — HEPARIN SODIUM 5000 [USP'U]/ML
5000 INJECTION, SOLUTION INTRAVENOUS; SUBCUTANEOUS EVERY 8 HOURS
Status: DISCONTINUED | OUTPATIENT
Start: 2024-07-27 | End: 2024-07-29

## 2024-07-27 RX ORDER — SIMETHICONE 80 MG
1 TABLET,CHEWABLE ORAL 4 TIMES DAILY PRN
Status: DISCONTINUED | OUTPATIENT
Start: 2024-07-27 | End: 2024-08-09

## 2024-07-27 RX ORDER — POLYETHYLENE GLYCOL 3350 17 G/17G
17 POWDER, FOR SOLUTION ORAL DAILY
Status: DISCONTINUED | OUTPATIENT
Start: 2024-07-27 | End: 2024-08-02

## 2024-07-27 RX ORDER — ALPRAZOLAM 0.25 MG/1
0.25 TABLET ORAL ONCE
Status: COMPLETED | OUTPATIENT
Start: 2024-07-27 | End: 2024-07-27

## 2024-07-27 RX ORDER — ALUMINUM HYDROXIDE, MAGNESIUM HYDROXIDE, AND SIMETHICONE 1200; 120; 1200 MG/30ML; MG/30ML; MG/30ML
30 SUSPENSION ORAL 4 TIMES DAILY PRN
Status: DISCONTINUED | OUTPATIENT
Start: 2024-07-27 | End: 2024-08-13 | Stop reason: HOSPADM

## 2024-07-27 RX ORDER — LORAZEPAM 0.5 MG/1
0.5 TABLET ORAL
Status: COMPLETED | OUTPATIENT
Start: 2024-07-27 | End: 2024-07-27

## 2024-07-27 RX ORDER — POTASSIUM CHLORIDE 20 MEQ/1
40 TABLET, EXTENDED RELEASE ORAL ONCE
Status: COMPLETED | OUTPATIENT
Start: 2024-07-27 | End: 2024-07-27

## 2024-07-27 RX ORDER — POTASSIUM CHLORIDE 20 MEQ/1
40 TABLET, EXTENDED RELEASE ORAL
Status: COMPLETED | OUTPATIENT
Start: 2024-07-27 | End: 2024-07-27

## 2024-07-27 RX ORDER — OXYCODONE HYDROCHLORIDE 5 MG/1
5 TABLET ORAL EVERY 6 HOURS PRN
Status: COMPLETED | OUTPATIENT
Start: 2024-07-27 | End: 2024-07-27

## 2024-07-27 RX ORDER — METOPROLOL SUCCINATE 100 MG/1
100 TABLET, EXTENDED RELEASE ORAL 2 TIMES DAILY
Status: DISCONTINUED | OUTPATIENT
Start: 2024-07-27 | End: 2024-08-13 | Stop reason: HOSPADM

## 2024-07-27 RX ORDER — ASPIRIN 81 MG/1
81 TABLET ORAL DAILY
Status: DISCONTINUED | OUTPATIENT
Start: 2024-07-27 | End: 2024-08-04

## 2024-07-27 RX ORDER — FUROSEMIDE 10 MG/ML
80 INJECTION INTRAMUSCULAR; INTRAVENOUS EVERY 6 HOURS
Status: DISCONTINUED | OUTPATIENT
Start: 2024-07-27 | End: 2024-07-27

## 2024-07-27 RX ORDER — POTASSIUM CHLORIDE 7.45 MG/ML
10 INJECTION INTRAVENOUS
Status: COMPLETED | OUTPATIENT
Start: 2024-07-27 | End: 2024-07-27

## 2024-07-27 RX ORDER — ATORVASTATIN CALCIUM 40 MG/1
80 TABLET, FILM COATED ORAL DAILY
Status: DISCONTINUED | OUTPATIENT
Start: 2024-07-27 | End: 2024-08-04

## 2024-07-27 RX ORDER — SODIUM CHLORIDE 0.9 % (FLUSH) 0.9 %
10 SYRINGE (ML) INJECTION EVERY 12 HOURS PRN
Status: DISCONTINUED | OUTPATIENT
Start: 2024-07-27 | End: 2024-08-13 | Stop reason: HOSPADM

## 2024-07-27 RX ORDER — IPRATROPIUM BROMIDE AND ALBUTEROL SULFATE 2.5; .5 MG/3ML; MG/3ML
3 SOLUTION RESPIRATORY (INHALATION) EVERY 4 HOURS PRN
Status: DISCONTINUED | OUTPATIENT
Start: 2024-07-27 | End: 2024-08-13 | Stop reason: HOSPADM

## 2024-07-27 RX ADMIN — DULOXETINE HYDROCHLORIDE 120 MG: 30 CAPSULE, DELAYED RELEASE ORAL at 08:07

## 2024-07-27 RX ADMIN — ACETAMINOPHEN 1000 MG: 500 TABLET ORAL at 02:07

## 2024-07-27 RX ADMIN — POTASSIUM CHLORIDE 40 MEQ: 1500 TABLET, EXTENDED RELEASE ORAL at 08:07

## 2024-07-27 RX ADMIN — LORAZEPAM 0.5 MG: 0.5 TABLET ORAL at 02:07

## 2024-07-27 RX ADMIN — ALPRAZOLAM 0.25 MG: 0.25 TABLET ORAL at 01:07

## 2024-07-27 RX ADMIN — POTASSIUM CHLORIDE 40 MEQ: 1500 TABLET, EXTENDED RELEASE ORAL at 01:07

## 2024-07-27 RX ADMIN — FUROSEMIDE 60 MG: 10 INJECTION, SOLUTION INTRAMUSCULAR; INTRAVENOUS at 12:07

## 2024-07-27 RX ADMIN — MAGNESIUM SULFATE HEPTAHYDRATE 2 G: 40 INJECTION, SOLUTION INTRAVENOUS at 01:07

## 2024-07-27 RX ADMIN — OXYCODONE 5 MG: 5 TABLET ORAL at 11:07

## 2024-07-27 RX ADMIN — HEPARIN SODIUM 5000 UNITS: 5000 INJECTION INTRAVENOUS; SUBCUTANEOUS at 09:07

## 2024-07-27 RX ADMIN — MORPHINE SULFATE 2 MG: 2 INJECTION, SOLUTION INTRAMUSCULAR; INTRAVENOUS at 02:07

## 2024-07-27 RX ADMIN — MAGNESIUM SULFATE HEPTAHYDRATE 2 G: 40 INJECTION, SOLUTION INTRAVENOUS at 12:07

## 2024-07-27 RX ADMIN — METOPROLOL SUCCINATE 100 MG: 100 TABLET, EXTENDED RELEASE ORAL at 09:07

## 2024-07-27 RX ADMIN — HEPARIN SODIUM 5000 UNITS: 5000 INJECTION INTRAVENOUS; SUBCUTANEOUS at 05:07

## 2024-07-27 RX ADMIN — FUROSEMIDE 80 MG: 10 INJECTION, SOLUTION INTRAVENOUS at 03:07

## 2024-07-27 RX ADMIN — FUROSEMIDE 80 MG: 10 INJECTION, SOLUTION INTRAVENOUS at 09:07

## 2024-07-27 RX ADMIN — FUROSEMIDE 80 MG: 10 INJECTION, SOLUTION INTRAVENOUS at 05:07

## 2024-07-27 RX ADMIN — Medication 6 MG: at 11:07

## 2024-07-27 RX ADMIN — POTASSIUM CHLORIDE 10 MEQ: 7.46 INJECTION, SOLUTION INTRAVENOUS at 07:07

## 2024-07-27 RX ADMIN — POTASSIUM CHLORIDE 10 MEQ: 7.46 INJECTION, SOLUTION INTRAVENOUS at 05:07

## 2024-07-27 RX ADMIN — POLYETHYLENE GLYCOL 3350 17 G: 17 POWDER, FOR SOLUTION ORAL at 08:07

## 2024-07-27 RX ADMIN — FUROSEMIDE 40 MG: 10 INJECTION, SOLUTION INTRAVENOUS at 02:07

## 2024-07-27 RX ADMIN — ATORVASTATIN CALCIUM 80 MG: 40 TABLET, FILM COATED ORAL at 09:07

## 2024-07-27 RX ADMIN — METOPROLOL SUCCINATE 100 MG: 100 TABLET, EXTENDED RELEASE ORAL at 08:07

## 2024-07-27 RX ADMIN — SPIRONOLACTONE 25 MG: 25 TABLET ORAL at 08:07

## 2024-07-27 RX ADMIN — ASPIRIN 81 MG: 81 TABLET, COATED ORAL at 08:07

## 2024-07-27 RX ADMIN — POTASSIUM CHLORIDE 10 MEQ: 7.46 INJECTION, SOLUTION INTRAVENOUS at 08:07

## 2024-07-27 RX ADMIN — ALPRAZOLAM 0.25 MG: 0.25 TABLET ORAL at 08:07

## 2024-07-27 NOTE — ASSESSMENT & PLAN NOTE
Seen by Dr. Saba with hepatology for previous chronic mild elevation in LFT, determined most likely NADLF. Now with cirrhotic morphology on CT. Previous EGD with portal gastropathy. LFT normal on admit. Concern for MASH cirrhosis versus R-sided HF producing overload symptoms    - Liver US with Doppler  - Started on spironolactone 25mg   - Lasix 80 tid

## 2024-07-27 NOTE — PROCEDURES
"Vicky Alvarado is a 59 y.o. female patient.    Temp: 97.9 °F (36.6 °C) (24 145)  Pulse: 70 (24 145)  Resp: 19 (24)  BP: 119/84 (24 1448)  SpO2: 97 % (24 145)       Paracentesis    Date/Time: 2024 2:59 PM  Location procedure was performed: [facility] MEDICINE    Performed by: Ngoc Villavicencio MD  Authorized by: Ngoc Villavicencio MD  Assisting provider: Kain Hendrickson MD  Pre-operative diagnosis: ascites  Consent Done: Yes  Consent: Written consent obtained.  Consent given by: patient  Procedure consent: procedure consent matches procedure scheduled  Site marked: the operative site was marked  Patient identity confirmed: , name and verbally with patient  Time out: Immediately prior to procedure a "time out" was called to verify the correct patient, procedure, equipment, support staff and site/side marked as required.  Procedure purpose: therapeutic and diagnostic  Indications: abdominal discomfort secondary to ascites and respiratory distress secondary to ascites    Anesthesia:  Local Anesthetic: lidocaine 1% without epinephrine  Preparation: Patient was prepped and draped in the usual sterile fashion.  Ultrasound guidance: yes  Fluid removed: 7000(ml)  Fluid appearance: serous  Complications: No  Specimens: Yes  Patient tolerance: Patient tolerated the procedure well with no immediate complications  Comments: Initial attempt by me unsuccessful, 2nd attempt by Dr. Villavicencio successful with ~7L removed. Albumin ordered for LVP.          2024    "

## 2024-07-27 NOTE — PLAN OF CARE
According to treating MD. Patient stated that she lost her job and doesn't have any insurance. SW emailed [facility] for Medicaid Screening.     RYAN Brooks, MSW-LMSW  Medical Social Worker/  ER Department

## 2024-07-27 NOTE — ASSESSMENT & PLAN NOTE
Chronic, controlled. Latest blood pressure and vitals reviewed-     Temp:  [98.1 °F (36.7 °C)]   Pulse:  [62-88]   Resp:  [14-33]   BP: (110-143)/(68-88)   SpO2:  [91 %-100 %] .   Home meds for hypertension were reviewed and noted below.   Hypertension Medications               furosemide (LASIX) 40 MG tablet Take 1 tablet (40 mg total) by mouth 2 (two) times daily. Resume as needed for edema until followup with your PCP.    lisinopriL (PRINIVIL,ZESTRIL) 40 MG tablet Take 1 tablet (40 mg total) by mouth once daily.    metoprolol succinate (TOPROL-XL) 100 MG 24 hr tablet Take 1 tablet (100 mg total) by mouth 2 (two) times daily.    NIFEdipine (PROCARDIA-XL) 60 MG (OSM) 24 hr tablet Take 1 tablet (60 mg total) by mouth once daily.            While in the hospital, will manage blood pressure as follows; Adjust home antihypertensive regimen as follows- Continue toprol for afib, hold other antihypertensives d/t need for aggressive diuresis plus presence of JHONATAN. Did add spironolactone for K retention and now presence of ascites in setting of possible cirrhosis     Will utilize p.r.n. blood pressure medication only if patient's blood pressure greater than 220/110 and she develops symptoms such as worsening chest pain or shortness of breath.

## 2024-07-27 NOTE — ED PROVIDER NOTES
"Encounter Date: 7/26/2024       History     Chief Complaint   Patient presents with    Shortness of Breath     SOB [id number], hx CHF, fluid overloaded. Initial room air O2 sat 85%, pt on 10L NRB satting 99%. Denies CP     59F w/ PMHx s/f CHF (home lasix), afib (s/p 2x ablations, watchman procedure, not on eliquis), HTN (lisinopril, nifidepine, metoprolol), HLD, DM (previously on tirzepatide; d/c 2/2 cost) presenting to the ED s/p "minor" fall c/o SOB. Notes she couldn't get up after her fall, came in in a wheelchair. Notes she has had SOB for the past month with exertion, though now it is with rest as well. States she does not have any pain with breathing, but does additionally endorse occasional, intermittent chest pain. Denies abdominal pain, but does endorse she feels full faster following tirzepatide discontinuation. Notes that she has never had a heart failure exacerbation before; also notes that while she has had leg swelling in the past, her abdominal swelling is new and her associated firmness is more unilateral than bilateral. Abdominal swelling pre-existing; growing over past 2 months.    Additionally endorses significant concern over loss of insurance, uncertainty over how she will pay for her treatment. Notes she has not spoken to her cardiologist in over 7 months. Referred to social care who will work on getting her medicaid treatment.     The history is provided by the patient. No  was used.     Review of patient's allergies indicates:   Allergen Reactions    Flecainide Shortness Of Breath and Swelling    Shellfish containing products Other (See Comments)     Makes gout terrible    Vancomycin analogues Hives, Itching and Rash     Past Medical History:   Diagnosis Date    Anticoagulant long-term use     Arthritis     Atrial fibrillation     cardioversion    Atrial fibrillation with RVR     HAS WATCHMAN IN PLACE NOW    Avascular necrosis     L hand    CHF (congestive heart failure)     " Chronic fatigue     Depression     Diabetes mellitus     Encounter for blood transfusion 07/22/2020    Encounter for blood transfusion 03/2022    Fatty liver     GERD (gastroesophageal reflux disease)     Hx of psychiatric care     Hypertension     Iron deficiency anemia 05/07/2022    TIBC 444 with saturated iron 8    Liver disease     Obese     Pre-diabetes 05/07/2022    Psychiatric problem     Sleep apnea     wears cpap    SOB (shortness of breath) on exertion     Weight loss     75lb intentional weight loss     Past Surgical History:   Procedure Laterality Date    ABLATION OF ARRHYTHMOGENIC FOCUS FOR ATRIAL FIBRILLATION N/A 07/20/2020    Procedure: Ablation atrial fibrillation;  Surgeon: Gabriel Hawley MD;  Location: [facility] EP LAB;  Service: Cardiology;  Laterality: N/A;  afib, PVI, RFA, REENA, SHADY, anes, MB, 3 Prep    ABLATION OF ARRHYTHMOGENIC FOCUS FOR ATRIAL FIBRILLATION N/A 01/24/2022    Procedure: Ablation atrial fibrillation;  Surgeon: Gabriel Hawley MD;  Location: [facility] EP LAB;  Service: Cardiology;  Laterality: N/A;  afib/afl, PVI (re-do)/CTI, RFA, REENA (cx if SR), SHADY, anes, MB, 3 Prep    APPLICATION OF WOUND VACUUM-ASSISTED CLOSURE DEVICE Left 08/03/2020    Procedure: APPLICATION, WOUND VAC;  Surgeon: SEMAJ Márquez III, MD;  [facility];  Service: Peripheral Vascular;  Laterality: Left;    APPLICATION OF WOUND VACUUM-ASSISTED CLOSURE DEVICE Left 08/06/2020    Procedure: APPLICATION, WOUND VAC;  Surgeon: SEMAJ Márquez III, MD;  Location: [facility] OR 2ND FLR;  Service: Peripheral Vascular;  Laterality: Left;    CARPAL TUNNEL RELEASE Right 06/10/2020    Procedure: RELEASE, CARPAL TUNNEL - RIGHT;  Surgeon: Adelaida Hall MD;  Location: [facility];  Service: Orthopedics;  Laterality: Right;  GENERAL AND REGIONAL    CARPAL TUNNEL RELEASE Left 05/05/2021    Procedure: RELEASE, CARPAL TUNNEL, LEFT;  Surgeon: Adelaida Hall MD;  Location: [facility] OR;  Service: Orthopedics;  Laterality:  Left;  GENERAL & REGIONAL IN PACU    CARPECTOMY Left 9/6/2023    Procedure: CARPECTOMY;  Surgeon: Adelaida Hall MD;  Location: [facility] OR;  Service: Orthopedics;  Laterality: Left;  MAC/REGIONAL  LEFT PROXIMAL ROW    CLOSURE OF WOUND Left 08/06/2020    Procedure: CLOSURE, WOUND;  Surgeon: SEMAJ Márquez III, MD;  [facility];  Service: Peripheral Vascular;  Laterality: Left;  Complex    COLONOSCOPY N/A 08/17/2021    Procedure: COLONOSCOPY Suprep;  Surgeon: Loco Deluca MD;  Location: [facility] ENDO;  Service: Endoscopy;  Laterality: N/A;    ECHOCARDIOGRAM,TRANSESOPHAGEAL N/A 07/24/2023    Procedure: Transesophageal echo (REENA) intra-procedure log documentation;  Surgeon: Leyla Diagnostic Provider;  Location: [facility] EP LAB;  Service: Cardiology;  Laterality: N/A;  s/p WM, REENA, anes, 3 Prep    ESOPHAGOGASTRODUODENOSCOPY N/A 08/17/2021    Procedure: EGD (ESOPHAGOGASTRODUODENOSCOPY);  Surgeon: Loco Deluca MD;  Location: [facility];  Service: Endoscopy;  Laterality: N/A;  Patient is schedule to have her Covid test on 08/14/2021 at the [facility] @ 9:30am. [place].    EXPLORATION OF FEMORAL ARTERY Left 07/21/2020    Procedure: EXPLORATION, ARTERY, FEMORAL;  Surgeon: SEMAJ Márquez III, MD;  Location: [address];  Service: Peripheral Vascular;  Laterality: Left;  1. Urgent Direct repair, L SFA branch laceration    FOOT SURGERY      HYSTERECTOMY      HYSTEROSCOPY WITH DILATION AND CURETTAGE OF UTERUS N/A 02/19/2022    Procedure: HYSTEROSCOPY, WITH DILATION AND CURETTAGE OF UTERUS;  Surgeon: Shane Palomo MD;  [facility];  Service: OB/GYN;  Laterality: N/A;    INCISION AND DRAINAGE OF KNEE Left 05/12/2022    Procedure: INCISION AND DRAINAGE, Prepatellar bursectomy.;  Surgeon: Dre Guzmán MD;  Location: [address];  Service: Orthopedics;  Laterality: Left;    LEFT HEART CATHETERIZATION Left 02/07/2023    Procedure: Left heart cath;  Surgeon: Josiah Terry,  MD;  Location: [facility] CATH LAB;  Service: Cardiology;  Laterality: Left;  borderline moderate bleeding risk 6.3%    OCCLUSION OF LEFT ATRIAL APPENDAGE N/A 06/12/2023    Procedure: Left atrial appendage occlusion;  Surgeon: Gabriel Hawley MD;  Location: [facility] EP LAB;  Service: Cardiology;  Laterality: N/A;  afib, watchman, BSCI, demi, anes, MB, 3prep    RELEASE OF ULNAR NERVE AT CUBITAL TUNNEL Bilateral     ROBOT-ASSISTED LAPAROSCOPIC ABDOMINAL HYSTERECTOMY USING DA KEIRY XI N/A 08/09/2022    Procedure: XI ROBOTIC HYSTERECTOMY;  Surgeon: Yolanda Barriga MD;  Location: [facility];  Service: OB/GYN;  Laterality: N/A;  dual console requested    ROBOT-ASSISTED LAPAROSCOPIC SALPINGO-OOPHORECTOMY Bilateral 08/09/2022    Procedure: ROBOTIC SALPINGO-OOPHORECTOMY;  Surgeon: Yolanda Barriga MD;  Location: [facility];  Service: OB/GYN;  Laterality: Bilateral;    TRANSESOPHAGEAL ECHOCARDIOGRAPHY N/A 06/12/2023    Procedure: ECHOCARDIOGRAM, TRANSESOPHAGEAL;  Surgeon: Cyril Mast MD;  Location: [facility] EP LAB;  Service: Cardiology;  Laterality: N/A;    TREATMENT OF CARDIAC ARRHYTHMIA N/A 01/29/2020    Procedure: CARDIOVERSION;  Surgeon: Gabriel Hawley MD;  Location: [facility] EP LAB;  Service: Cardiology;  Laterality: N/A;  af, demi, dccv, anes, mb, 345    TREATMENT OF CARDIAC ARRHYTHMIA  01/24/2022    Procedure: Cardioversion or Defibrillation;  Surgeon: Gabriel Hawley MD;  Location: [facility] EP LAB;  Service: Cardiology;;    WOUND DEBRIDEMENT Left 08/06/2020    Procedure: DEBRIDEMENT, WOUND;  Surgeon: SEMAJ Márquez III, MD;  [address];  Service: Peripheral Vascular;  Laterality: Left;     Family History   Problem Relation Name Age of Onset    Cataracts Mother      Hypertension Mother      Thyroid disease Mother      Diabetes Father      Hypertension Father      Hypertension Sister      Hypertension Brother      Amblyopia Neg Hx      Blindness Neg Hx      Cancer Neg Hx      Glaucoma Neg Hx      Macular  degeneration Neg Hx      Retinal detachment Neg Hx      Strabismus Neg Hx      Stroke Neg Hx      Breast cancer Neg Hx      Colon cancer Neg Hx      Ovarian cancer Neg Hx       Social History     Tobacco Use    Smoking status: Former     Current packs/day: 0.00     Types: Cigarettes     Quit date: 2019     Years since quittin.0    Smokeless tobacco: Never   Substance Use Topics    Alcohol use: No    Drug use: No     Review of Systems   Constitutional:  Positive for activity change and fatigue.   Respiratory:  Positive for shortness of breath. Negative for chest tightness.    Cardiovascular:  Positive for chest pain (Intermittent) and leg swelling.   Gastrointestinal:  Positive for abdominal distention. Negative for abdominal pain, constipation (bowel movement yesterday), nausea and vomiting.   Genitourinary:  Positive for frequency (When she takes lasix). Negative for difficulty urinating.   Neurological:  Positive for weakness. Negative for dizziness and syncope.   Psychiatric/Behavioral: Negative.         Physical Exam     Initial Vitals [24 2158]   BP Pulse Resp Temp SpO2   130/80 88 (!) 22 98.1 °F (36.7 °C) 99 %      MAP       --         Physical Exam    Constitutional: She appears well-developed and well-nourished. She is not diaphoretic. She appears distressed.   HENT:   Head: Normocephalic and atraumatic.   Mouth/Throat: No oropharyngeal exudate.   Eyes: Right eye exhibits no discharge. Left eye exhibits no discharge. No scleral icterus.   Cardiovascular:  Normal rate and regular rhythm.           No murmur heard.  Pulmonary/Chest: She is in respiratory distress. She has wheezes.   Crackles diffusely   Abdominal: She exhibits distension. There is no abdominal tenderness.   Tense, firm, distended, slight fluid wave There is no rebound and no guarding.   Musculoskeletal:         General: Edema present. No tenderness.      Comments: 2+ pitting edema to knee; firm, tense skin upper thigh.      Neurological: She is alert.   Skin: Skin is warm and dry.         ED Course   Procedures  Labs Reviewed   CBC W/ AUTO DIFFERENTIAL - Abnormal       Result Value    WBC 5.35      RBC 3.72 (*)     Hemoglobin 10.6 (*)     Hematocrit 33.8 (*)     MCV 91      MCH 28.5      MCHC 31.4 (*)     RDW 14.5      Platelets 186      MPV 10.0      Immature Granulocytes 0.4      Gran # (ANC) 3.9      Immature Grans (Abs) 0.02      Lymph # 0.6 (*)     Mono # 0.6      Eos # 0.2      Baso # 0.06      nRBC 0      Gran % 72.5      Lymph % 11.2 (*)     Mono % 11.2      Eosinophil % 3.6      Basophil % 1.1      Differential Method Automated     COMPREHENSIVE METABOLIC PANEL - Abnormal    Sodium 140      Potassium 2.9 (*)     Chloride 101      CO2 25      Glucose 101      BUN 27 (*)     Creatinine 1.8 (*)     Calcium 8.8      Total Protein 7.1      Albumin 2.7 (*)     Total Bilirubin 0.7      Alkaline Phosphatase 114      AST 13      ALT <5 (*)     eGFR 31.9 (*)     Anion Gap 14     B-TYPE NATRIURETIC PEPTIDE - Abnormal     (*)    MAGNESIUM - Abnormal    Magnesium 1.5 (*)     Narrative:     Add on MG per Dr. Stephenson @ 22:57 pm to order # [id number]   ISTAT PROCEDURE - Abnormal    POC Glucose 102      POC BUN 29      POC Creatinine 1.9 (*)     POC Sodium 139      POC Potassium 2.9 (*)     POC Chloride 100      POC TCO2 (MEASURED) 26      POC Ionized Calcium 1.04 (*)     POC Hematocrit 34 (*)     Sample MAITE     ISTAT PROCEDURE - Abnormal    POC PH 7.446      POC PCO2 41.9      POC PO2 178 (*)     POC HCO3 28.8 (*)     POC BE 5 (*)     POC SATURATED O2 100      POC TCO2 30 (*)     Sample VENOUS      Site Other      Allens Test N/A     TROPONIN I    Troponin I <0.006     MAGNESIUM     EKG Readings: (Independently Interpreted)   Initial Reading: No STEMI. Previous EKG: Compared with most recent EKG   EKG - sinus rhythm with Premature supraventricular complexes; similar across 3 EKGs taken in ED.          Imaging Results              CT  Abdomen Pelvis  Without Contrast (Final result)  Result time 07/27/24 02:05:55      Final result by Tahir Montalvo MD (07/27/24 02:05:55)                   Impression:      Large volume ascites with simple fluid density.    Prominent anasarca.    Left adrenal gland 2.5 cm nodule.  Attention on follow-up imaging.    Hyperattenuating appearance of the gallbladder, possibly related to vicarious excretion of a recent dose of IV contrast (if there has in fact been recent contrast administration).  Cholelithiasis, radiopaque gallbladder sludge, or gallbladder mural calcification not excluded.  Correlate clinically.  Consider further evaluation with ultrasound.    Cirrhotic morphology of the liver.    Small hiatal hernia.  A small air-fluid level in the distal esophagus possibly represents gastroesophageal reflux.  Correlate clinically.  Consider follow-up EGD if clinically appropriate.    Bibasilar pulmonary opacities are favored to represent compressive atelectasis secondary to elevation of the hemidiaphragms, secondary to the large volume of ascites.  Pneumonia other underlying pathology not fully excluded.    Metallic transcatheter left atrial appendage exclusion device.    Electronically signed by resident: Madison Edge  Date:    07/27/2024  Time:    00:27    Electronically signed by: Tahir Montalvo  Date:    07/27/2024  Time:    02:05               Narrative:    EXAMINATION:  CT ABDOMEN PELVIS WITHOUT CONTRAST    CLINICAL HISTORY:  C/f new onset abdominal ascites 2/2 HF; r/o alternative causes of abdominal distention given unilateral firmness vs. bilateral on exam.;    TECHNIQUE:  Using multi-detector helical CT technique, axial CT images of the abdomen and pelvis were obtained without IV contrast.  2D post-processing coronal and sagittal reconstructions was performed.    COMPARISON:  None    FINDINGS:  Lung base: Bibasilar pulmonary airspace opacities.  Elevated appearance of the diaphragm.    Visualized portion of  heart: Postsurgical change of atrial ablation with a transcatheter implanted metallic device.  Significant coronary artery calcification.  No cardiomegaly or pericardial effusion.    Liver: Nodular contour.  No focal lesion.    Gallbladder: Hyperattenuating gallbladder.  Peripheral higher density noted.    Bile duct: No intra-or extrahepatic biliary ductal dilatation.    Spleen: Unremarkable.  Splenule present    Pancreas: Unremarkable.    Adrenal glands: 2.5 cm left adrenal gland rounded nodule.    Genitourinary: The kidneys are normal in size and location. The ureters appear normal in course and caliber.  No evidence of nephrolithiasis or hydroureteronephrosis.  The urinary bladder demonstrates smooth contour with no wall thickening.    Reproductive organs: Hysterectomy.    GI tract: Limited evaluation due to obscuring ascites.  Small volume fluid within lower esophagus.  The stomach is unremarkable. The visualized loops of small and large bowel show no evidence of obstruction or inflammation.    Peritoneum: Large volume ascites with simple fluid density.  Greater omentum suspended within the fluid with no focal abnormality.    Retroperitoneum: No significant adenopathy.    Vasculature: Normal caliber of abdominal aorta with mild calcification.    Abdominal wall: Prominent anasarca.    Bones: No acute osseous abnormality.                                       X-Ray Chest AP Portable (Final result)  Result time 07/27/24 00:58:40      Final result by Rogelio Moseley MD (07/27/24 00:58:40)                   Impression:      Hypoventilatory changes with mild perihilar infiltrates greater on the right.  Correlate for CHF.      Electronically signed by: Rogelio Moseley  Date:    07/27/2024  Time:    00:58               Narrative:    EXAMINATION:  XR CHEST AP PORTABLE    CLINICAL HISTORY:  CHF;    TECHNIQUE:  Single frontal view of the chest was performed.    COMPARISON:  None    FINDINGS:  Cardiac silhouette is  "borderline in size.  Perihilar infiltrates more so on the right.  Low volume of the lungs suggest decreased inspiratory effort.  Trachea is midline.  Bones intact.                                    X-Rays:   Independently Interpreted Readings:   Other Readings:  XR CHEST AP PORTABLE - Massively dilated abdomen on exam. Significant compression of bilateral lungs.   CT ABDOMEN PELVIS WITHOUT CONTRAST -       Medications   magnesium sulfate 2g in water 50mL IVPB (premix) (2 g Intravenous New Bag 7/27/24 0053)   furosemide injection 60 mg (60 mg Intravenous Given 7/27/24 0053)   furosemide injection 40 mg (40 mg Intravenous Given 7/27/24 0224)   potassium chloride SA CR tablet 40 mEq (40 mEq Oral Given 7/27/24 0137)   morphine injection 2 mg (2 mg Intravenous Given 7/27/24 0225)   LORazepam tablet 0.5 mg (0.5 mg Oral Given 7/27/24 0224)   acetaminophen tablet 1,000 mg (1,000 mg Oral Given 7/27/24 0224)     Medical Decision Making  59F w/ PMHx s/f CHF, HTN, afib (s/p watchman procedure), DM presents to the ED s/p fall with associated SOB and difficulty getting back up. Has been putting off coming to the ED due to concerns her symptoms were "bad," presented today due to worsening SOB and symptomology. C/f cardiogenic HF exacerbation etiology (e.g. afib, MI); however EKG w/o acute abnormality. CXR s/f grossly distended abdomen, compressed, diminutive lungs w/ edema bilaterally. CT s/f cirrhotic liver, massively large volume ascites leading to compressive symptoms. Lasix 100, BiPAP 18/10; tx of ADHF, morphine and diazepam for pain and anxiety. Following weaning off BiPAP, admission to . Requires large volume paracentesis in AM following admission.     Note: NEEDS social work f/u for insurance help.     Amount and/or Complexity of Data Reviewed  Labs: ordered. Decision-making details documented in ED Course.     Details: CMP s/f JHONATAN (Cr 1.8), hypomagnesemia, hypokalemia. Repleted K, Mg.   CBC s/f likely no acute " changes.  Radiology: ordered.     Details: XR CHEST AP PORTABLE - diminutive lungs 2/2 compression by grossly distended abdomen.     CT ABDOMEN PELVIS WITHOUT CONTRAST - Cirrhotic morphology of the liver; bibasilar pulmonary opacities are favored to represent compressive atelectasis secondary to elevation of the hemidiaphragms, secondary to the large volume of ascites    Risk  OTC drugs.  Prescription drug management.  Decision regarding hospitalization.               ED Course as of 07/27/24 0243 Fri Jul 26, 2024   8546 BNP(!): 410 [AA]      ED Course User Index  [AA] Jea Roberts MD                           Clinical Impression:  Final diagnoses:  [R06.02] Shortness of breath  [I50.9] Acute on chronic heart failure, unspecified heart failure type (Primary)                 Jae Roberts MD  Resident  07/27/24 0243       Jae Roberts MD  Resident  07/27/24 0257

## 2024-07-27 NOTE — CONSULTS
Inpatient consult to Hospitalist  Consult performed by: Kain Hendrickson MD  Consult ordered by: Noemí Gallego MD      07/27/2024    Consult received for therapeutic and diagnostic paracentesis. Initial attempt by myself unsuccessful, but subsequent attempt performed by Dr. Ngoc Villavicencio successful with approximately 7L ascitic removed. Fluid sent off for studies. Patient with no immediate complications and reporting improvement in discomfort. See earlier Procedure note for further detail.    Kain Hendrickson MD   Internal Medicine PGY-3

## 2024-07-27 NOTE — ASSESSMENT & PLAN NOTE
Patient with Paroxysmal (<7 days) atrial fibrillation which is controlled currently with Beta Blocker. Patient is currently in sinus rhythm.[id number] Score: 3. Anticoagulation not indicated due to has Watchman .   Toradol 30mg and Decadron 10mg IM injections given per written order. See MAR for admin details. Consent form signed prior to injections. Pt tolerated well.

## 2024-07-27 NOTE — NURSING
.Nurses Note -- 4 Eyes      7/27/2024   5:30 PM      Skin assessed during: Admit      [x] No Altered Skin Integrity Present    []Prevention Measures Documented      [] Yes- Altered Skin Integrity Present or Discovered   [] LDA Added if Not in Epic (Describe Wound)   [] New Altered Skin Integrity was Present on Admit and Documented in LDA   [] Wound Image Taken    Wound Care Consulted? No    Attending Nurse:  Harrison LICEA LPN     Second RN/Staff Member:   Candace CHA RN

## 2024-07-27 NOTE — ASSESSMENT & PLAN NOTE
"Patient's FSGs are controlled on current medication regimen.  Last A1c reviewed-   Lab Results   Component Value Date    HGBA1C 4.9 07/27/2024     Most recent fingerstick glucose reviewed- No results for input(s): "POCTGLUCOSE" in the last 24 hours.  Current correctional scale   holding as BG stable  Maintain anti-hyperglycemic dose as follows-   Antihyperglycemics (From admission, onward)      None          Hold Oral hypoglycemics while patient is in the hospital.  "

## 2024-07-27 NOTE — NURSING
Pt arrived from ED, AAOX4, no complaints, placed on cardiac monitoring. On 6L NC with humidification. Pure wick placed on pt. Pt able to stand with assist to bedside commode. Pt family at bedside. Pt resting in bed, bed low and locked, call light and belongings in reach.

## 2024-07-27 NOTE — SUBJECTIVE & OBJECTIVE
Past Medical History:   Diagnosis Date    Anticoagulant long-term use     Arthritis     Atrial fibrillation     cardioversion    Atrial fibrillation with RVR     HAS WATCHMAN IN PLACE NOW    Avascular necrosis     L hand    CHF (congestive heart failure)     Chronic fatigue     Depression     Diabetes mellitus     Encounter for blood transfusion 07/22/2020    Encounter for blood transfusion 03/2022    Fatty liver     GERD (gastroesophageal reflux disease)     Hx of psychiatric care     Hypertension     Iron deficiency anemia 05/07/2022    TIBC 444 with saturated iron 8    Liver disease     Obese     Pre-diabetes 05/07/2022    Psychiatric problem     Sleep apnea     wears cpap    SOB (shortness of breath) on exertion     Weight loss     75lb intentional weight loss       Past Surgical History:   Procedure Laterality Date    ABLATION OF ARRHYTHMOGENIC FOCUS FOR ATRIAL FIBRILLATION N/A 07/20/2020    Procedure: Ablation atrial fibrillation;  Surgeon: Gabriel Hawley MD;  Location: [facility] EP LAB;  Service: Cardiology;  Laterality: N/A;  afib, PVI, RFA, REENA, SHADY, anes, MB, 3 Prep    ABLATION OF ARRHYTHMOGENIC FOCUS FOR ATRIAL FIBRILLATION N/A 01/24/2022    Procedure: Ablation atrial fibrillation;  Surgeon: Gabriel Hawley MD;  Location: [facility] EP LAB;  Service: Cardiology;  Laterality: N/A;  afib/afl, PVI (re-do)/CTI, RFA, REENA (cx if SR), SHADY, anes, MB, 3 Prep    APPLICATION OF WOUND VACUUM-ASSISTED CLOSURE DEVICE Left 08/03/2020    Procedure: APPLICATION, WOUND VAC;  Surgeon: SEMAJ Márquez III, MD;  [facility];  Service: Peripheral Vascular;  Laterality: Left;    APPLICATION OF WOUND VACUUM-ASSISTED CLOSURE DEVICE Left 08/06/2020    Procedure: APPLICATION, WOUND VAC;  Surgeon: SEMAJ Márquez III, MD;  [facility];  Service: Peripheral Vascular;  Laterality: Left;    CARPAL TUNNEL RELEASE Right 06/10/2020    Procedure: RELEASE, CARPAL TUNNEL - RIGHT;  Surgeon: Adelaida Hall MD;   Location: [facility] OR;  Service: Orthopedics;  Laterality: Right;  GENERAL AND REGIONAL    CARPAL TUNNEL RELEASE Left 05/05/2021    Procedure: RELEASE, CARPAL TUNNEL, LEFT;  Surgeon: Adelaida Hall MD;  Location: [facility] OR;  Service: Orthopedics;  Laterality: Left;  GENERAL & REGIONAL IN PACU    CARPECTOMY Left 9/6/2023    Procedure: CARPECTOMY;  Surgeon: Adelaida Hall MD;  Location: [facility] OR;  Service: Orthopedics;  Laterality: Left;  MAC/REGIONAL  LEFT PROXIMAL ROW    CLOSURE OF WOUND Left 08/06/2020    Procedure: CLOSURE, WOUND;  Surgeon: SEMAJ Márquez III, MD;  Location: [facility] OR 2ND FLR;  Service: Peripheral Vascular;  Laterality: Left;  Complex    COLONOSCOPY N/A 08/17/2021    Procedure: COLONOSCOPY Suprep;  Surgeon: Loco Deluca MD;  Location: [facility] ENDO;  Service: Endoscopy;  Laterality: N/A;    ECHOCARDIOGRAM,TRANSESOPHAGEAL N/A 07/24/2023    Procedure: Transesophageal echo (REENA) intra-procedure log documentation;  Surgeon: Leyla Diagnostic Provider;  Location: [facility] EP LAB;  Service: Cardiology;  Laterality: N/A;  s/p WM, REENA, anes, 3 Prep    ESOPHAGOGASTRODUODENOSCOPY N/A 08/17/2021    Procedure: EGD (ESOPHAGOGASTRODUODENOSCOPY);  Surgeon: Loco Deluca MD;  Location: [facility] ENDO;  Service: Endoscopy;  Laterality: N/A;  Patient is schedule to have her Covid test on 08/14/2021 at the [facility] @ 9:30am. [place].    EXPLORATION OF FEMORAL ARTERY Left 07/21/2020    Procedure: EXPLORATION, ARTERY, FEMORAL;  Surgeon: SEMAJ Márquez III, MD;  Location: [facility] OR 2ND FLR;  Service: Peripheral Vascular;  Laterality: Left;  1. Urgent Direct repair, L SFA branch laceration    FOOT SURGERY      HYSTERECTOMY      HYSTEROSCOPY WITH DILATION AND CURETTAGE OF UTERUS N/A 02/19/2022    Procedure: HYSTEROSCOPY, WITH DILATION AND CURETTAGE OF UTERUS;  Surgeon: Shane Palomo MD;  Location: [facility] OR 2ND FLR;  Service: OB/GYN;  Laterality: N/A;    INCISION AND DRAINAGE OF KNEE Left 05/12/2022     Procedure: INCISION AND DRAINAGE, Prepatellar bursectomy.;  Surgeon: Dre Guzmán MD;  Location: [facility] OR 2ND FLR;  Service: Orthopedics;  Laterality: Left;    LEFT HEART CATHETERIZATION Left 02/07/2023    Procedure: Left heart cath;  Surgeon: Josiah Terry MD;  Location: [facility] CATH LAB;  Service: Cardiology;  Laterality: Left;  borderline moderate bleeding risk 6.3%    OCCLUSION OF LEFT ATRIAL APPENDAGE N/A 06/12/2023    Procedure: Left atrial appendage occlusion;  Surgeon: Gabriel Hawley MD;  Location: [facility] EP LAB;  Service: Cardiology;  Laterality: N/A;  afib, watchman, BSCI, demi, ALIYA ibarra, 3prep    RELEASE OF ULNAR NERVE AT CUBITAL TUNNEL Bilateral     ROBOT-ASSISTED LAPAROSCOPIC ABDOMINAL HYSTERECTOMY USING DA KEIRY XI N/A 08/09/2022    Procedure: XI ROBOTIC HYSTERECTOMY;  Surgeon: Yolanda Barriga MD;  Location: [facility];  Service: OB/GYN;  Laterality: N/A;  dual console requested    ROBOT-ASSISTED LAPAROSCOPIC SALPINGO-OOPHORECTOMY Bilateral 08/09/2022    Procedure: ROBOTIC SALPINGO-OOPHORECTOMY;  Surgeon: Yolanda Barriga MD;  Location: [facility];  Service: OB/GYN;  Laterality: Bilateral;    TRANSESOPHAGEAL ECHOCARDIOGRAPHY N/A 06/12/2023    Procedure: ECHOCARDIOGRAM, TRANSESOPHAGEAL;  Surgeon: Cyril Mast MD;  Location: [facility] EP LAB;  Service: Cardiology;  Laterality: N/A;    TREATMENT OF CARDIAC ARRHYTHMIA N/A 01/29/2020    Procedure: CARDIOVERSION;  Surgeon: Gabriel Hawley MD;  Location: [facility] EP LAB;  Service: Cardiology;  Laterality: N/A;  af, demi, dccv, fred, mb, 345    TREATMENT OF CARDIAC ARRHYTHMIA  01/24/2022    Procedure: Cardioversion or Defibrillation;  Surgeon: Gabirel Hawley MD;  Location: [facility] EP LAB;  Service: Cardiology;;    WOUND DEBRIDEMENT Left 08/06/2020    Procedure: DEBRIDEMENT, WOUND;  Surgeon: SEMAJ Márquez III, MD;  Location: [facility] OR 2ND FLR;  Service: Peripheral Vascular;  Laterality: Left;       Review of patient's allergies indicates:   Allergen  Reactions    Flecainide Shortness Of Breath and Swelling    Shellfish containing products Other (See Comments)     Makes gout terrible    Vancomycin analogues Hives, Itching and Rash       Current Facility-Administered Medications on File Prior to Encounter   Medication    mupirocin 2 % ointment     Current Outpatient Medications on File Prior to Encounter   Medication Sig    acetaminophen (TYLENOL) 500 MG tablet Take 2 tablets (1,000 mg total) by mouth 2 (two) times daily as needed for Pain. Begin post op day 3.    albuterol (VENTOLIN HFA) 90 mcg/actuation inhaler Inhale 2 puffs into the lungs every 6 (six) hours as needed for Wheezing. Rescue    ALPRAZolam (XANAX) 0.5 MG tablet Take 1 tablet (0.5 mg total) by mouth 2 (two) times daily as needed for Anxiety.    aspirin (ECOTRIN) 81 MG EC tablet Take 81 mg by mouth once daily.    atorvastatin (LIPITOR) 80 MG tablet Take 1 tablet (80 mg total) by mouth once daily.    b complex vitamins capsule Take 1 capsule by mouth once daily.    colchicine (COLCRYS) 0.6 mg tablet For gout attack: Take TWO tablets by mouth, then, 2 hours later, take ONE additional tablet. Then take 1 tablet twice daily only if still needed for gout pain.    DULoxetine (CYMBALTA) 60 MG capsule Take 2 capsules (120 mg total) by mouth once daily.    furosemide (LASIX) 40 MG tablet Take 1 tablet (40 mg total) by mouth 2 (two) times daily. Resume as needed for edema until followup with your PCP.    gluc-shikha-[facility]#[id number]-K-duud-reymundo-bor 750 mg-644 mg- 30 mg-1 mg Tab Take 1 tablet by mouth once daily.     guaiFENesin-codeine 100-10 mg/5 ml (TUSSI-ORGANIDIN NR)  mg/5 mL syrup Take 5 mLs by mouth 3 (three) times daily as needed for Cough.    HYDROcodone-acetaminophen (NORCO) 5-325 mg per tablet Take 1 tablet by mouth every 6 (six) hours as needed for Pain.    krill/om-3/dha/epa/phospho/ast (MEGARED OMEGA-3 KRILL OIL ORAL) Take 1 capsule by mouth once daily.    lisinopriL (PRINIVIL,ZESTRIL) 40 MG tablet Take  1 tablet (40 mg total) by mouth once daily.    metoprolol succinate (TOPROL-XL) 100 MG 24 hr tablet Take 1 tablet (100 mg total) by mouth 2 (two) times daily.    multivitamin (THERAGRAN) per tablet Take 1 tablet by mouth once daily.    NIFEdipine (PROCARDIA-XL) 60 MG (OSM) 24 hr tablet Take 1 tablet (60 mg total) by mouth once daily.    omeprazole (PRILOSEC) 20 MG capsule TAKE 1 CAPSULE BY MOUTH ONCE DAILY    potassium chloride SA (K-DUR,KLOR-CON) 20 MEQ tablet Take 1 tablet (20 mEq total) by mouth once daily. ONLY TAKE WITH LASIX.    predniSONE (DELTASONE) 10 MG tablet Take 1 tablet by mouth daily    SENNA 8.6 mg tablet Take 1 tablet by mouth 2 (two) times daily. (Patient taking differently: Take 1 tablet by mouth 2 (two) times daily. prn)    tirzepatide (MOUNJARO) 12.5 mg/0.5 mL PnIj Inject 12.5 mg into the skin every 7 days.    traMADoL (ULTRAM) 50 mg tablet Take 1 tablet (50 mg total) by mouth every 6 (six) hours as needed for Pain.    traZODone (DESYREL) 100 MG tablet Take 1 tablet (100 mg total) by mouth nightly as needed for Insomnia.    [DISCONTINUED] ibuprofen (ADVIL,MOTRIN) 600 MG tablet Take 1 tablet (600 mg total) by mouth 3 (three) times daily as needed for Pain. PO delivery    [DISCONTINUED] medroxyPROGESTERone (PROVERA) 10 MG tablet Take 2 tablets (20 mg total) by mouth 3 (three) times daily.    [DISCONTINUED] pantoprazole (PROTONIX) 40 MG tablet Take 1 tablet (40 mg total) by mouth once daily. (Patient not taking: Reported on 2022)     Family History       Problem Relation (Age of Onset)    Cataracts Mother    Diabetes Father    Hypertension Mother, Father, Sister, Brother    Thyroid disease Mother          Tobacco Use    Smoking status: Former     Current packs/day: 0.00     Types: Cigarettes     Quit date: 2019     Years since quittin.0    Smokeless tobacco: Never   Substance and Sexual Activity    Alcohol use: No    Drug use: No    Sexual activity: Not Currently     Partners: Male      Birth control/protection: See Surgical Hx     Review of Systems   Constitutional:  Positive for activity change. Negative for chills and fever.   HENT:  Negative for congestion and rhinorrhea.    Respiratory:  Positive for chest tightness and shortness of breath.    Cardiovascular:  Positive for chest pain and leg swelling. Negative for palpitations.   Gastrointestinal:  Positive for abdominal distention. Negative for abdominal pain, diarrhea, nausea and vomiting.   Genitourinary:  Negative for dysuria.   Musculoskeletal:  Negative for arthralgias.   Psychiatric/Behavioral:  Negative for agitation and behavioral problems.      Objective:     Vital Signs (Most Recent):  Temp: 98.1 °F (36.7 °C) (07/26/24 2158)  Pulse: 62 (07/27/24 0300)  Resp: 20 (07/27/24 0300)  BP: 110/80 (07/27/24 0300)  SpO2: 97 % (07/27/24 0300) Vital Signs (24h Range):  Temp:  [98.1 °F (36.7 °C)] 98.1 °F (36.7 °C)  Pulse:  [62-88] 62  Resp:  [16-33] 20  SpO2:  [91 %-100 %] 97 %  BP: (110-143)/(68-88) 110/80        There is no height or weight on file to calculate BMI.     Physical Exam  Constitutional:       General: She is not in acute distress.     Appearance: Normal appearance. She is not ill-appearing.   HENT:      Head: Normocephalic and atraumatic.      Right Ear: External ear normal.      Left Ear: External ear normal.      Nose: Nose normal.      Mouth/Throat:      Mouth: Mucous membranes are dry.      Pharynx: Oropharynx is clear.   Eyes:      Extraocular Movements: Extraocular movements intact.      Conjunctiva/sclera: Conjunctivae normal.   Cardiovascular:      Rate and Rhythm: Normal rate and regular rhythm.      Pulses: Normal pulses.      Heart sounds: Normal heart sounds.      Comments: Cannot see JVD 2/2 habitus   Pulmonary:      Effort: Pulmonary effort is normal.      Breath sounds: Rales present. No wheezing.      Comments: On BIPAP  Soft crackles at b/l lung bases  Abdominal:      General: Abdomen is flat. There is  distension.      Palpations: Abdomen is soft.      Tenderness: There is no abdominal tenderness.      Comments: Distended and slightly firm, no mass palpated. Fluid shift with positional changes.    Musculoskeletal:         General: Swelling present. Normal range of motion.      Cervical back: Normal range of motion.      Comments: Pitting edema up to inner thighs   Skin:     General: Skin is warm and dry.      Capillary Refill: Capillary refill takes less than 2 seconds.   Neurological:      General: No focal deficit present.      Mental Status: She is alert and oriented to person, place, and time.   Psychiatric:         Mood and Affect: Mood normal.         Behavior: Behavior normal.                Significant Labs: All pertinent labs within the past 24 hours have been reviewed.  CBC:   Recent Labs   Lab 07/26/24 2236 07/26/24 2237   WBC 5.35  --    HGB 10.6*  --    HCT 33.8* 34*     --      CMP:   Recent Labs   Lab 07/26/24 2236      K 2.9*      CO2 25      BUN 27*   CREATININE 1.8*   CALCIUM 8.8   PROT 7.1   ALBUMIN 2.7*   BILITOT 0.7   ALKPHOS 114   AST 13   ALT <5*   ANIONGAP 14     Cardiac Markers:   Recent Labs   Lab 07/26/24 2236   *     Troponin:   Recent Labs   Lab 07/26/24 2236   TROPONINI <0.006       Significant Imaging: I have reviewed all pertinent imaging results/findings within the past 24 hours.

## 2024-07-27 NOTE — ASSESSMENT & PLAN NOTE
Cardiorenal syndrome  Acute HF exacerbation of uncertain etiology (no missed doses of lasix). Last TTE with EF 60% and G2DD. Concern that she may have new R-sided HF given anasarca (large volume ascites) versus now concomitant MASH cirrhosis compounding volume overloaded state and therefore needs higher diuresis at baseline. Suspect JHONATAN d.t overload - CRS.     - Lasix 80mg IV TID (slightly conservative in case LVP can occur later)  - IM6 consulted for large-volume paracentesis  - Started spironolactone 25mg for K retention and slightly augmented diuresis  - BMP tid to check K   - BIPAP qhs and during naps

## 2024-07-27 NOTE — ASSESSMENT & PLAN NOTE
Concerned anasarca may represent sequelae of cirrhosis. EGD 8/2021 with Portal hypertensive gastropathy. No varices. No abd pain, fever, chills, or confusion to raise concern for SBP or HE respectively    - Liver doppler  - Aldactone and Lasix for diuresis  - Favor albumin if hypotensive  - LVP

## 2024-07-27 NOTE — HPI
Patient is a 59F with CHF (EF 60%, G2DD), HTN, Afib (s/p watchman procedure), T2DM (prev. on tirzepatide) presents to the ED w/ minor fall and progressively worsening SOB. Notes she couldn't get up after her fall, came in in a wheelchair. Notes she has had SOB for the past month with exertion, though now it is with rest as well. Reports orthopnea during this time frame. States she does not have any pain with breathing, but does additionally endorse occasional, intermittent chest pain. Denies abdominal pain, fever, or chills. States she has early satiety. No prior admission for HF exacerbation. Reports experiencing b/l lower extremity edema previously, but denies prior abdominal swelling. Swelling precluding normal ambulation, using walker (previously for balance) to assist d.t weakness. Has not missed doses of home lasix or antihypertensive medications. No reduced urine output at home.     In ED, normotensive, no tachycardia, some tachypnea (RR 22-28), initially on NRB -> HFNC 12L -> BIPAP -> 6L. No desaturations recorded during transitions in O2 therapy (BIPAP primarily placed for pulmonary edema seen on CXR). CBC with normocytic anemia (Hb 10.6), no leukocytosis. CMP with hypokalemia K 2.9, Cr 1.8/BUN 27 (baseline appears to be 1-1.4), Alb 2.7, no LFT elevation. Mg 1.5. , Troponin normal. VBG 7.46 / 42. CT AP with cirrhotic liver morphology, questionable GB sludge/stones, large volume ascites. CXR with hypoventilatory changes, R>L perihilar infiltrates c/f CHF. EKG NSR.  Received K replacement, 100mg IV Lasix in ED.

## 2024-07-27 NOTE — ED NOTES
I-STAT Chem-8+ Results:   Value Reference Range   Sodium 139 136-145 mmol/L   Potassium  2.9 3.5-5.1 mmol/L   Chloride 100  mmol/L   Ionized Calcium 1.04 1.06-1.42 mmol/L   CO2 (measured) 26 23-29 mmol/L   Glucose 102  mg/dL   BUN 29 6-30 mg/dL   Creatinine 1.9 0.5-1.4 mg/dL   Hematocrit 34 36-54%

## 2024-07-27 NOTE — H&P
[facility] - Emergency Dept  [facility] Medicine  History & Physical    Patient Name: Vicky Alvarado  MRN: [id number]  Patient Class: IP- Inpatient  Admission Date: 7/26/2024  Attending Physician: Jonathan Brownlee MD   Primary Care Provider: Gordy Cordero MD         Patient information was obtained from patient, past medical records, and ER records.     Subjective:     Principal Problem:Acute on chronic diastolic heart failure    Chief Complaint:   Chief Complaint   Patient presents with    Shortness of Breath     SOB [id number], hx CHF, fluid overloaded. Initial room air O2 sat 85%, pt on 10L NRB satting 99%. Denies CP        HPI: Patient is a 59F with CHF (EF 60%, G2DD), HTN, Afib (s/p watchman procedure), T2DM (prev. on tirzepatide) presents to the ED w/ minor fall and progressively worsening SOB. Notes she couldn't get up after her fall, came in in a wheelchair. Notes she has had SOB for the past month with exertion, though now it is with rest as well. Reports orthopnea during this time frame. States she does not have any pain with breathing, but does additionally endorse occasional, intermittent chest pain. Denies abdominal pain, fever, or chills. States she has early satiety. No prior admission for HF exacerbation. Reports experiencing b/l lower extremity edema previously, but denies prior abdominal swelling. Swelling precluding normal ambulation, using walker (previously for balance) to assist d.t weakness. Has not missed doses of home lasix or antihypertensive medications. No reduced urine output at home.     In ED, normotensive, no tachycardia, some tachypnea (RR 22-28), initially on NRB -> HFNC 12L -> BIPAP -> 6L. No desaturations recorded during transitions in O2 therapy (BIPAP primarily placed for pulmonary edema seen on CXR). CBC with normocytic anemia (Hb 10.6), no leukocytosis. CMP with hypokalemia K 2.9, Cr 1.8/BUN 27 (baseline appears to be 1-1.4), Alb 2.7, no LFT elevation. Mg 1.5. ,  Troponin normal. VBG 7.46 / 42. CT AP with cirrhotic liver morphology, questionable GB sludge/stones, large volume ascites. CXR with hypoventilatory changes, R>L perihilar infiltrates c/f CHF. EKG NSR.  Received K replacement, 100mg IV Lasix in ED.       Past Medical History:   Diagnosis Date    Anticoagulant long-term use     Arthritis     Atrial fibrillation     cardioversion    Atrial fibrillation with RVR     HAS WATCHMAN IN PLACE NOW    Avascular necrosis     L hand    CHF (congestive heart failure)     Chronic fatigue     Depression     Diabetes mellitus     Encounter for blood transfusion 07/22/2020    Encounter for blood transfusion 03/2022    Fatty liver     GERD (gastroesophageal reflux disease)     Hx of psychiatric care     Hypertension     Iron deficiency anemia 05/07/2022    TIBC 444 with saturated iron 8    Liver disease     Obese     Pre-diabetes 05/07/2022    Psychiatric problem     Sleep apnea     wears cpap    SOB (shortness of breath) on exertion     Weight loss     75lb intentional weight loss       Past Surgical History:   Procedure Laterality Date    ABLATION OF ARRHYTHMOGENIC FOCUS FOR ATRIAL FIBRILLATION N/A 07/20/2020    Procedure: Ablation atrial fibrillation;  Surgeon: Gabriel Hawley MD;  Location: [facility] EP LAB;  Service: Cardiology;  Laterality: N/A;  afib, PVI, RFA, REENA, SHADY, anes, MB, 3 Prep    ABLATION OF ARRHYTHMOGENIC FOCUS FOR ATRIAL FIBRILLATION N/A 01/24/2022    Procedure: Ablation atrial fibrillation;  Surgeon: Gabriel Hawley MD;  Location: [facility] EP LAB;  Service: Cardiology;  Laterality: N/A;  afib/afl, PVI (re-do)/CTI, RFA, REENA (cx if SR), SHADY, anes, MB, 3 Prep    APPLICATION OF WOUND VACUUM-ASSISTED CLOSURE DEVICE Left 08/03/2020    Procedure: APPLICATION, WOUND VAC;  Surgeon: SEMAJ Márquez III, MD;  Location: [facility] OR [address] [place] [address];  Service: Peripheral Vascular;  Laterality: Left;    APPLICATION OF WOUND VACUUM-ASSISTED CLOSURE DEVICE Left 08/06/2020    Procedure:  APPLICATION, WOUND VAC;  Surgeon: SEMAJ Márquez III, MD;  Location: [facility] OR [facility] FLR;  Service: Peripheral Vascular;  Laterality: Left;    CARPAL TUNNEL RELEASE Right 06/10/2020    Procedure: RELEASE, CARPAL TUNNEL - RIGHT;  Surgeon: Adelaida Hall MD;  Location: [facility] OR;  Service: Orthopedics;  Laterality: Right;  GENERAL AND REGIONAL    CARPAL TUNNEL RELEASE Left 05/05/2021    Procedure: RELEASE, CARPAL TUNNEL, LEFT;  Surgeon: Adelaida Hall MD;  Location: [facility] OR;  Service: Orthopedics;  Laterality: Left;  GENERAL & REGIONAL IN PACU    CARPECTOMY Left 9/6/2023    Procedure: CARPECTOMY;  Surgeon: Adelaida Hall MD;  Location: [facility] OR;  Service: Orthopedics;  Laterality: Left;  MAC/REGIONAL  LEFT PROXIMAL ROW    CLOSURE OF WOUND Left 08/06/2020    Procedure: CLOSURE, WOUND;  Surgeon: SEMAJ Márquez III, MD;  [facility];  Service: Peripheral Vascular;  Laterality: Left;  Complex    COLONOSCOPY N/A 08/17/2021    Procedure: COLONOSCOPY Suprep;  Surgeon: Loco Deluca MD;  Location: [facility];  Service: Endoscopy;  Laterality: N/A;    ECHOCARDIOGRAM,TRANSESOPHAGEAL N/A 07/24/2023    Procedure: Transesophageal echo (REENA) intra-procedure log documentation;  Surgeon: [facility] Diagnostic Provider;  Location: [facility] EP LAB;  Service: Cardiology;  Laterality: N/A;  s/p WM, REENA, anes, 3 Prep    ESOPHAGOGASTRODUODENOSCOPY N/A 08/17/2021    Procedure: EGD (ESOPHAGOGASTRODUODENOSCOPY);  Surgeon: Loco Deluca MD;  Location: [facility];  Service: Endoscopy;  Laterality: N/A;  Patient is schedule to have her Covid test on 08/14/2021 at the [facility] @ 9:30am. [place].    EXPLORATION OF FEMORAL ARTERY Left 07/21/2020    Procedure: EXPLORATION, ARTERY, FEMORAL;  Surgeon: SEMAJ Márquez III, MD;  Location: [facility] OR [facility];  Service: Peripheral Vascular;  Laterality: Left;  1. Urgent Direct repair, L SFA branch laceration    FOOT SURGERY      HYSTERECTOMY      HYSTEROSCOPY WITH  DILATION AND CURETTAGE OF UTERUS N/A 02/19/2022    Procedure: HYSTEROSCOPY, WITH DILATION AND CURETTAGE OF UTERUS;  Surgeon: Shane Palomo MD;  [facility];  Service: OB/GYN;  Laterality: N/A;    INCISION AND DRAINAGE OF KNEE Left 05/12/2022    Procedure: INCISION AND DRAINAGE, Prepatellar bursectomy.;  Surgeon: Dre Guzmán MD;  [facility];  Service: Orthopedics;  Laterality: Left;    LEFT HEART CATHETERIZATION Left 02/07/2023    Procedure: Left heart cath;  Surgeon: Josiah Terry MD;  Location: [facility] CATH LAB;  Service: Cardiology;  Laterality: Left;  borderline moderate bleeding risk 6.3%    OCCLUSION OF LEFT ATRIAL APPENDAGE N/A 06/12/2023    Procedure: Left atrial appendage occlusion;  Surgeon: Gabriel Hawley MD;  Location: [facility] EP LAB;  Service: Cardiology;  Laterality: N/A;  afib, watchman, BSCI, demi, anes, MB, 3prep    RELEASE OF ULNAR NERVE AT CUBITAL TUNNEL Bilateral     ROBOT-ASSISTED LAPAROSCOPIC ABDOMINAL HYSTERECTOMY USING DA KEIRY XI N/A 08/09/2022    Procedure: XI ROBOTIC HYSTERECTOMY;  Surgeon: Yolanda Barriga MD;  Location: [facility];  Service: OB/GYN;  Laterality: N/A;  dual console requested    ROBOT-ASSISTED LAPAROSCOPIC SALPINGO-OOPHORECTOMY Bilateral 08/09/2022    Procedure: ROBOTIC SALPINGO-OOPHORECTOMY;  Surgeon: Yolanda Barriga MD;  Location: [facility];  Service: OB/GYN;  Laterality: Bilateral;    TRANSESOPHAGEAL ECHOCARDIOGRAPHY N/A 06/12/2023    Procedure: ECHOCARDIOGRAM, TRANSESOPHAGEAL;  Surgeon: Cyril Mast MD;  Location: [facility] EP LAB;  Service: Cardiology;  Laterality: N/A;    TREATMENT OF CARDIAC ARRHYTHMIA N/A 01/29/2020    Procedure: CARDIOVERSION;  Surgeon: Gabriel Hawley MD;  Location: [facility] EP LAB;  Service: Cardiology;  Laterality: N/A;  af, demi, dccv, anes, mb, 345    TREATMENT OF CARDIAC ARRHYTHMIA  01/24/2022    Procedure: Cardioversion or Defibrillation;  Surgeon: Gabriel Hawley MD;  Location: [facility] EP LAB;  Service:  Cardiology;;    WOUND DEBRIDEMENT Left 08/06/2020    Procedure: DEBRIDEMENT, WOUND;  Surgeon: SEMAJ Márquez III, MD;  Location: [facility] OR [address] [place] [address];  Service: Peripheral Vascular;  Laterality: Left;       Review of patient's allergies indicates:   Allergen Reactions    Flecainide Shortness Of Breath and Swelling    Shellfish containing products Other (See Comments)     Makes gout terrible    Vancomycin analogues Hives, Itching and Rash       Current Facility-Administered Medications on File Prior to Encounter   Medication    mupirocin 2 % ointment     Current Outpatient Medications on File Prior to Encounter   Medication Sig    acetaminophen (TYLENOL) 500 MG tablet Take 2 tablets (1,000 mg total) by mouth 2 (two) times daily as needed for Pain. Begin post op day 3.    albuterol (VENTOLIN HFA) 90 mcg/actuation inhaler Inhale 2 puffs into the lungs every 6 (six) hours as needed for Wheezing. Rescue    ALPRAZolam (XANAX) 0.5 MG tablet Take 1 tablet (0.5 mg total) by mouth 2 (two) times daily as needed for Anxiety.    aspirin (ECOTRIN) 81 MG EC tablet Take 81 mg by mouth once daily.    atorvastatin (LIPITOR) 80 MG tablet Take 1 tablet (80 mg total) by mouth once daily.    b complex vitamins capsule Take 1 capsule by mouth once daily.    colchicine (COLCRYS) 0.6 mg tablet For gout attack: Take TWO tablets by mouth, then, 2 hours later, take ONE additional tablet. Then take 1 tablet twice daily only if still needed for gout pain.    DULoxetine (CYMBALTA) 60 MG capsule Take 2 capsules (120 mg total) by mouth once daily.    furosemide (LASIX) 40 MG tablet Take 1 tablet (40 mg total) by mouth 2 (two) times daily. Resume as needed for edema until followup with your PCP.    gluc-[facility]#5-[id number]-reymundo-bor 750 mg-644 mg- 30 mg-1 mg Tab Take 1 tablet by mouth once daily.     guaiFENesin-codeine 100-10 mg/5 ml (TUSSI-ORGANIDIN NR)  mg/5 mL syrup Take 5 mLs by mouth 3 (three) times daily as needed for Cough.     HYDROcodone-acetaminophen (NORCO) 5-325 mg per tablet Take 1 tablet by mouth every 6 (six) hours as needed for Pain.    krill/om-3/dha/epa/phospho/ast (MEGARED OMEGA-3 KRILL OIL ORAL) Take 1 capsule by mouth once daily.    lisinopriL (PRINIVIL,ZESTRIL) 40 MG tablet Take 1 tablet (40 mg total) by mouth once daily.    metoprolol succinate (TOPROL-XL) 100 MG 24 hr tablet Take 1 tablet (100 mg total) by mouth 2 (two) times daily.    multivitamin (THERAGRAN) per tablet Take 1 tablet by mouth once daily.    NIFEdipine (PROCARDIA-XL) 60 MG (OSM) 24 hr tablet Take 1 tablet (60 mg total) by mouth once daily.    omeprazole (PRILOSEC) 20 MG capsule TAKE 1 CAPSULE BY MOUTH ONCE DAILY    potassium chloride SA (K-DUR,KLOR-CON) 20 MEQ tablet Take 1 tablet (20 mEq total) by mouth once daily. ONLY TAKE WITH LASIX.    predniSONE (DELTASONE) 10 MG tablet Take 1 tablet by mouth daily    SENNA 8.6 mg tablet Take 1 tablet by mouth 2 (two) times daily. (Patient taking differently: Take 1 tablet by mouth 2 (two) times daily. prn)    tirzepatide (MOUNJARO) 12.5 mg/0.5 mL PnIj Inject 12.5 mg into the skin every 7 days.    traMADoL (ULTRAM) 50 mg tablet Take 1 tablet (50 mg total) by mouth every 6 (six) hours as needed for Pain.    traZODone (DESYREL) 100 MG tablet Take 1 tablet (100 mg total) by mouth nightly as needed for Insomnia.    [DISCONTINUED] ibuprofen (ADVIL,MOTRIN) 600 MG tablet Take 1 tablet (600 mg total) by mouth 3 (three) times daily as needed for Pain. PO delivery    [DISCONTINUED] medroxyPROGESTERone (PROVERA) 10 MG tablet Take 2 tablets (20 mg total) by mouth 3 (three) times daily.    [DISCONTINUED] pantoprazole (PROTONIX) 40 MG tablet Take 1 tablet (40 mg total) by mouth once daily. (Patient not taking: Reported on 2/18/2022)     Family History       Problem Relation (Age of Onset)    Cataracts Mother    Diabetes Father    Hypertension Mother, Father, Sister, Brother    Thyroid disease Mother          Tobacco Use    Smoking  status: Former     Current packs/day: 0.00     Types: Cigarettes     Quit date: 2019     Years since quittin.0    Smokeless tobacco: Never   Substance and Sexual Activity    Alcohol use: No    Drug use: No    Sexual activity: Not Currently     Partners: Male     Birth control/protection: See Surgical Hx     Review of Systems   Constitutional:  Positive for activity change. Negative for chills and fever.   HENT:  Negative for congestion and rhinorrhea.    Respiratory:  Positive for chest tightness and shortness of breath.    Cardiovascular:  Positive for chest pain and leg swelling. Negative for palpitations.   Gastrointestinal:  Positive for abdominal distention. Negative for abdominal pain, diarrhea, nausea and vomiting.   Genitourinary:  Negative for dysuria.   Musculoskeletal:  Negative for arthralgias.   Psychiatric/Behavioral:  Negative for agitation and behavioral problems.      Objective:     Vital Signs (Most Recent):  Temp: 98.1 °F (36.7 °C) (24 2158)  Pulse: 62 (24 0300)  Resp: 20 (24 0300)  BP: 110/80 (24 0300)  SpO2: 97 % (24 0300) Vital Signs (24h Range):  Temp:  [98.1 °F (36.7 °C)] 98.1 °F (36.7 °C)  Pulse:  [62-88] 62  Resp:  [16-33] 20  SpO2:  [91 %-100 %] 97 %  BP: (110-143)/(68-88) 110/80        There is no height or weight on file to calculate BMI.     Physical Exam  Constitutional:       General: She is not in acute distress.     Appearance: Normal appearance. She is not ill-appearing.   HENT:      Head: Normocephalic and atraumatic.      Right Ear: External ear normal.      Left Ear: External ear normal.      Nose: Nose normal.      Mouth/Throat:      Mouth: Mucous membranes are dry.      Pharynx: Oropharynx is clear.   Eyes:      Extraocular Movements: Extraocular movements intact.      Conjunctiva/sclera: Conjunctivae normal.   Cardiovascular:      Rate and Rhythm: Normal rate and regular rhythm.      Pulses: Normal pulses.      Heart sounds: Normal heart  sounds.      Comments: Cannot see JVD 2/2 habitus   Pulmonary:      Effort: Pulmonary effort is normal.      Breath sounds: Rales present. No wheezing.      Comments: On BIPAP  Soft crackles at b/l lung bases  Abdominal:      General: Abdomen is flat. There is distension.      Palpations: Abdomen is soft.      Tenderness: There is no abdominal tenderness.      Comments: Distended and slightly firm, no mass palpated. Fluid shift with positional changes.    Musculoskeletal:         General: Swelling present. Normal range of motion.      Cervical back: Normal range of motion.      Comments: Pitting edema up to inner thighs   Skin:     General: Skin is warm and dry.      Capillary Refill: Capillary refill takes less than 2 seconds.   Neurological:      General: No focal deficit present.      Mental Status: She is alert and oriented to person, place, and time.   Psychiatric:         Mood and Affect: Mood normal.         Behavior: Behavior normal.                Significant Labs: All pertinent labs within the past 24 hours have been reviewed.  CBC:   Recent Labs   Lab 07/26/24 2236 07/26/24 2237   WBC 5.35  --    HGB 10.6*  --    HCT 33.8* 34*     --      CMP:   Recent Labs   Lab 07/26/24 2236      K 2.9*      CO2 25      BUN 27*   CREATININE 1.8*   CALCIUM 8.8   PROT 7.1   ALBUMIN 2.7*   BILITOT 0.7   ALKPHOS 114   AST 13   ALT <5*   ANIONGAP 14     Cardiac Markers:   Recent Labs   Lab 07/26/24 2236   *     Troponin:   Recent Labs   Lab 07/26/24 2236   TROPONINI <0.006       Significant Imaging: I have reviewed all pertinent imaging results/findings within the past 24 hours.  Assessment/Plan:     * Acute on chronic diastolic heart failure  Cardiorenal syndrome  Acute HF exacerbation of uncertain etiology (no missed doses of lasix). Last TTE with EF 60% and G2DD. Concern that she may have new R-sided HF given anasarca (large volume ascites) versus now concomitant MASH cirrhosis  "compounding volume overloaded state and therefore needs higher diuresis at baseline. Suspect JHONATAN d.t overload - CRS.     - Lasix 80mg IV TID (slightly conservative in case LVP can occur later)  - IM6 consulted for large-volume paracentesis  - Started spironolactone 25mg for K retention and slightly augmented diuresis  - BMP tid to check K   - BIPAP qhs and during naps       NAFLD (nonalcoholic fatty liver disease)  Seen by Dr. Saba with hepatology for previous chronic mild elevation in LFT, determined most likely NADLF. Now with cirrhotic morphology on CT. Previous EGD with portal gastropathy. LFT normal on admit. Concern for MASH cirrhosis versus R-sided HF producing overload symptoms    - Liver US with Doppler  - Started on spironolactone 25mg   - Lasix 80 tid      Anasarca  Concerned anasarca may represent sequelae of cirrhosis. EGD 8/2021 with Portal hypertensive gastropathy. No varices. No abd pain, fever, chills, or confusion to raise concern for SBP or HE respectively    - Liver doppler  - Aldactone and Lasix for diuresis  - Favor albumin if hypotensive  - LVP       Stage 3b chronic kidney disease  Creatine stable for now. BMP reviewed- noted CrCl cannot be calculated (Unknown ideal weight.). according to latest data. Based on current GFR, CKD stage is stage 3 - GFR 30-59.  Monitor UOP and serial BMP and adjust therapy as needed. Renally dose meds. Avoid nephrotoxic medications and procedures.    Type 2 diabetes mellitus with diabetic polyneuropathy, without long-term current use of insulin  Patient's FSGs are controlled on current medication regimen.  Last A1c reviewed-   Lab Results   Component Value Date    HGBA1C 4.9 07/27/2024     Most recent fingerstick glucose reviewed- No results for input(s): "POCTGLUCOSE" in the last 24 hours.  Current correctional scale   holding as BG stable  Maintain anti-hyperglycemic dose as follows-   Antihyperglycemics (From admission, onward)      None          Hold Oral " hypoglycemics while patient is in the hospital.    Atrial Fibrillation (paroxysmal)  Patient with Paroxysmal (<7 days) atrial fibrillation which is controlled currently with Beta Blocker. Patient is currently in sinus rhythm.[id number] Score: 3. Anticoagulation not indicated due to has Watchman .    Essential hypertension  Chronic, controlled. Latest blood pressure and vitals reviewed-     Temp:  [98.1 °F (36.7 °C)]   Pulse:  [62-88]   Resp:  [14-33]   BP: (110-143)/(68-88)   SpO2:  [91 %-100 %] .   Home meds for hypertension were reviewed and noted below.   Hypertension Medications               furosemide (LASIX) 40 MG tablet Take 1 tablet (40 mg total) by mouth 2 (two) times daily. Resume as needed for edema until followup with your PCP.    lisinopriL (PRINIVIL,ZESTRIL) 40 MG tablet Take 1 tablet (40 mg total) by mouth once daily.    metoprolol succinate (TOPROL-XL) 100 MG 24 hr tablet Take 1 tablet (100 mg total) by mouth 2 (two) times daily.    NIFEdipine (PROCARDIA-XL) 60 MG (OSM) 24 hr tablet Take 1 tablet (60 mg total) by mouth once daily.            While in the hospital, will manage blood pressure as follows; Adjust home antihypertensive regimen as follows- Continue toprol for afib, hold other antihypertensives d/t need for aggressive diuresis plus presence of JHONATAN. Did add spironolactone for K retention and now presence of ascites in setting of possible cirrhosis     Will utilize p.r.n. blood pressure medication only if patient's blood pressure greater than 220/110 and she develops symptoms such as worsening chest pain or shortness of breath.      VTE Risk Mitigation (From admission, onward)           Ordered     heparin (porcine) injection 5,000 Units  Every 8 hours         07/27/24 0351     IP VTE HIGH RISK PATIENT  Once         07/27/24 0351     Place sequential compression device  Until discontinued         07/27/24 0351                                    Noemí Gallego MD  Department of Hospital  Medicine  [facility] - Emergency Dept

## 2024-07-28 LAB
ALBUMIN SERPL BCP-MCNC: 2.5 G/DL (ref 3.5–5.2)
ALBUMIN SERPL BCP-MCNC: 2.5 G/DL (ref 3.5–5.2)
ALP SERPL-CCNC: 112 U/L (ref 55–135)
ALT SERPL W/O P-5'-P-CCNC: <5 U/L (ref 10–44)
ANION GAP SERPL CALC-SCNC: 13 MMOL/L (ref 8–16)
ANION GAP SERPL CALC-SCNC: 15 MMOL/L (ref 8–16)
AST SERPL-CCNC: 15 U/L (ref 10–40)
BASOPHILS # BLD AUTO: 0.03 K/UL (ref 0–0.2)
BASOPHILS NFR BLD: 0.7 % (ref 0–1.9)
BILIRUB SERPL-MCNC: 0.6 MG/DL (ref 0.1–1)
BUN SERPL-MCNC: 22 MG/DL (ref 6–20)
BUN SERPL-MCNC: 22 MG/DL (ref 6–20)
CALCIUM SERPL-MCNC: 8.9 MG/DL (ref 8.7–10.5)
CALCIUM SERPL-MCNC: 8.9 MG/DL (ref 8.7–10.5)
CHLORIDE SERPL-SCNC: 102 MMOL/L (ref 95–110)
CHLORIDE SERPL-SCNC: 103 MMOL/L (ref 95–110)
CO2 SERPL-SCNC: 23 MMOL/L (ref 23–29)
CO2 SERPL-SCNC: 26 MMOL/L (ref 23–29)
CREAT SERPL-MCNC: 1.3 MG/DL (ref 0.5–1.4)
CREAT SERPL-MCNC: 1.4 MG/DL (ref 0.5–1.4)
DIFFERENTIAL METHOD BLD: ABNORMAL
EOSINOPHIL # BLD AUTO: 0.3 K/UL (ref 0–0.5)
EOSINOPHIL NFR BLD: 6 % (ref 0–8)
ERYTHROCYTE [DISTWIDTH] IN BLOOD BY AUTOMATED COUNT: 14.3 % (ref 11.5–14.5)
EST. GFR  (NO RACE VARIABLE): 43.1 ML/MIN/1.73 M^2
EST. GFR  (NO RACE VARIABLE): 47.1 ML/MIN/1.73 M^2
GLUCOSE SERPL-MCNC: 83 MG/DL (ref 70–110)
GLUCOSE SERPL-MCNC: 96 MG/DL (ref 70–110)
HCT VFR BLD AUTO: 37 % (ref 37–48.5)
HGB BLD-MCNC: 11 G/DL (ref 12–16)
IMM GRANULOCYTES # BLD AUTO: 0.01 K/UL (ref 0–0.04)
IMM GRANULOCYTES NFR BLD AUTO: 0.2 % (ref 0–0.5)
LYMPHOCYTES # BLD AUTO: 0.5 K/UL (ref 1–4.8)
LYMPHOCYTES NFR BLD: 11.5 % (ref 18–48)
MAGNESIUM SERPL-MCNC: 1.7 MG/DL (ref 1.6–2.6)
MCH RBC QN AUTO: 28 PG (ref 27–31)
MCHC RBC AUTO-ENTMCNC: 29.7 G/DL (ref 32–36)
MCV RBC AUTO: 94 FL (ref 82–98)
MONOCYTES # BLD AUTO: 0.6 K/UL (ref 0.3–1)
MONOCYTES NFR BLD: 12.7 % (ref 4–15)
NEUTROPHILS # BLD AUTO: 3 K/UL (ref 1.8–7.7)
NEUTROPHILS NFR BLD: 68.9 % (ref 38–73)
NRBC BLD-RTO: 0 /100 WBC
PHOSPHATE SERPL-MCNC: 3 MG/DL (ref 2.7–4.5)
PHOSPHATE SERPL-MCNC: 3.2 MG/DL (ref 2.7–4.5)
PLATELET # BLD AUTO: 159 K/UL (ref 150–450)
PMV BLD AUTO: 9.6 FL (ref 9.2–12.9)
POTASSIUM SERPL-SCNC: 3.7 MMOL/L (ref 3.5–5.1)
POTASSIUM SERPL-SCNC: 4.2 MMOL/L (ref 3.5–5.1)
PROT SERPL-MCNC: 6.9 G/DL (ref 6–8.4)
RBC # BLD AUTO: 3.93 M/UL (ref 4–5.4)
SODIUM SERPL-SCNC: 141 MMOL/L (ref 136–145)
SODIUM SERPL-SCNC: 141 MMOL/L (ref 136–145)
WBC # BLD AUTO: 4.34 K/UL (ref 3.9–12.7)

## 2024-07-28 PROCEDURE — 25000003 PHARM REV CODE 250

## 2024-07-28 PROCEDURE — 80053 COMPREHEN METABOLIC PANEL: CPT

## 2024-07-28 PROCEDURE — 83735 ASSAY OF MAGNESIUM: CPT

## 2024-07-28 PROCEDURE — 84100 ASSAY OF PHOSPHORUS: CPT

## 2024-07-28 PROCEDURE — 63600175 PHARM REV CODE 636 W HCPCS

## 2024-07-28 PROCEDURE — 85025 COMPLETE CBC W/AUTO DIFF WBC: CPT

## 2024-07-28 PROCEDURE — 20600001 HC STEP DOWN PRIVATE ROOM

## 2024-07-28 PROCEDURE — 80069 RENAL FUNCTION PANEL: CPT | Performed by: STUDENT IN AN ORGANIZED HEALTH CARE EDUCATION/TRAINING PROGRAM

## 2024-07-28 PROCEDURE — 36415 COLL VENOUS BLD VENIPUNCTURE: CPT

## 2024-07-28 PROCEDURE — 36415 COLL VENOUS BLD VENIPUNCTURE: CPT | Performed by: STUDENT IN AN ORGANIZED HEALTH CARE EDUCATION/TRAINING PROGRAM

## 2024-07-28 RX ORDER — OXYCODONE HYDROCHLORIDE 5 MG/1
5 TABLET ORAL EVERY 6 HOURS PRN
Status: DISCONTINUED | OUTPATIENT
Start: 2024-07-28 | End: 2024-07-28

## 2024-07-28 RX ORDER — OXYCODONE HYDROCHLORIDE 5 MG/1
5 TABLET ORAL EVERY 4 HOURS PRN
Status: DISCONTINUED | OUTPATIENT
Start: 2024-07-28 | End: 2024-07-31

## 2024-07-28 RX ADMIN — HEPARIN SODIUM 5000 UNITS: 5000 INJECTION INTRAVENOUS; SUBCUTANEOUS at 09:07

## 2024-07-28 RX ADMIN — FUROSEMIDE 80 MG: 10 INJECTION, SOLUTION INTRAVENOUS at 02:07

## 2024-07-28 RX ADMIN — OXYCODONE HYDROCHLORIDE 5 MG: 5 TABLET ORAL at 11:07

## 2024-07-28 RX ADMIN — OXYCODONE HYDROCHLORIDE 5 MG: 5 TABLET ORAL at 06:07

## 2024-07-28 RX ADMIN — METOPROLOL SUCCINATE 100 MG: 100 TABLET, EXTENDED RELEASE ORAL at 08:07

## 2024-07-28 RX ADMIN — OXYCODONE HYDROCHLORIDE 5 MG: 5 TABLET ORAL at 08:07

## 2024-07-28 RX ADMIN — FUROSEMIDE 80 MG: 10 INJECTION, SOLUTION INTRAVENOUS at 08:07

## 2024-07-28 RX ADMIN — ACETAMINOPHEN 650 MG: 325 TABLET ORAL at 04:07

## 2024-07-28 RX ADMIN — HEPARIN SODIUM 5000 UNITS: 5000 INJECTION INTRAVENOUS; SUBCUTANEOUS at 07:07

## 2024-07-28 RX ADMIN — ALPRAZOLAM 0.25 MG: 0.25 TABLET ORAL at 08:07

## 2024-07-28 RX ADMIN — DULOXETINE HYDROCHLORIDE 120 MG: 30 CAPSULE, DELAYED RELEASE ORAL at 08:07

## 2024-07-28 RX ADMIN — ASPIRIN 81 MG: 81 TABLET, COATED ORAL at 08:07

## 2024-07-28 RX ADMIN — HEPARIN SODIUM 5000 UNITS: 5000 INJECTION INTRAVENOUS; SUBCUTANEOUS at 02:07

## 2024-07-28 RX ADMIN — ATORVASTATIN CALCIUM 80 MG: 40 TABLET, FILM COATED ORAL at 08:07

## 2024-07-28 RX ADMIN — SPIRONOLACTONE 25 MG: 25 TABLET ORAL at 08:07

## 2024-07-28 NOTE — PLAN OF CARE
Problem: Adult Inpatient Plan of Care  Goal: Plan of Care Review  Outcome: Progressing  Goal: Patient-Specific Goal (Individualized)  Outcome: Progressing  Goal: Optimal Comfort and Wellbeing  Outcome: Progressing  Goal: Readiness for Transition of Care  Outcome: Progressing     Problem: Bariatric Environmental Safety  Goal: Safety Maintained with Care  Outcome: Progressing     Problem: Diabetes Comorbidity  Goal: Blood Glucose Level Within Targeted Range  Outcome: Progressing

## 2024-07-28 NOTE — ASSESSMENT & PLAN NOTE
Chronic, controlled. Latest blood pressure and vitals reviewed-     Temp:  [97.6 °F (36.4 °C)-98.5 °F (36.9 °C)]   Pulse:  [62-76]   Resp:  [18-20]   BP: (110-166)/(69-80)   SpO2:  [85 %-100 %] .   Home meds for hypertension were reviewed and noted below.   Hypertension Medications               furosemide (LASIX) 40 MG tablet Take 1 tablet (40 mg total) by mouth 2 (two) times daily. Resume as needed for edema until followup with your PCP.    lisinopriL (PRINIVIL,ZESTRIL) 40 MG tablet Take 1 tablet (40 mg total) by mouth once daily.    metoprolol succinate (TOPROL-XL) 100 MG 24 hr tablet Take 1 tablet (100 mg total) by mouth 2 (two) times daily.    NIFEdipine (PROCARDIA-XL) 60 MG (OSM) 24 hr tablet Take 1 tablet (60 mg total) by mouth once daily.            While in the hospital, will manage blood pressure as follows; Adjust home antihypertensive regimen as follows- Continue toprol for afib, hold other antihypertensives d/t need for aggressive diuresis plus presence of JHONATAN. Did add spironolactone for K retention and now presence of ascites in setting of possible cirrhosis     Will utilize p.r.n. blood pressure medication only if patient's blood pressure greater than 220/110 and she develops symptoms such as worsening chest pain or shortness of breath.

## 2024-07-28 NOTE — SUBJECTIVE & OBJECTIVE
Interval History: Ms. Alvarado was resting in be with her CPAP and has concerns for knee pain and bilateral lower extremity.. She states her SOB has improved since her admission. An X-Ray of her knee was completed and showed degenerative joint disease.  Ascitic fluid studies show no signs of SBP at this time. Abdominal CT shows a cirrhotic morphology of the liver. US/doppler of  liver shows evidence of cirrhosis, splenomegally, and portal hypertension. She shows no signs of jaundice. Her liver enzymes, Alk Phos, and bilirubin are wnl. Protein studies of the ascitic fluid show increased protein leaning more towards a cardiac etiology.  CXR shows heart to be boarderline in regards to being enlarged and some perihilar infiltrate. Currently awaiting ECHO. Patient states they emptied 4 canisters of urine for her yesterday.     Review of Systems   Constitutional:  Negative for chills and diaphoresis.   Respiratory:  Positive for shortness of breath. Negative for cough.         SOB improved since admission.    Cardiovascular:  Positive for leg swelling. Negative for chest pain and palpitations.   Gastrointestinal:  Positive for abdominal distention and abdominal pain. Negative for nausea and vomiting.   Genitourinary:  Negative for difficulty urinating, dysuria and hematuria.   Musculoskeletal:  Positive for arthralgias.        Complains of right knee pain. Patient fell before coming to the ED.    Neurological:  Negative for dizziness and headaches.     Objective:     Vital Signs (Most Recent):  Temp: 98.1 °F (36.7 °C) (07/28/24 1201)  Pulse: 64 (07/28/24 1201)  Resp: 18 (07/28/24 1201)  BP: 132/76 (07/28/24 1201)  SpO2: 98 % (07/28/24 1201) Vital Signs (24h Range):  Temp:  [97.6 °F (36.4 °C)-98.5 °F (36.9 °C)] 98.1 °F (36.7 °C)  Pulse:  [62-76] 64  Resp:  [18-20] 18  SpO2:  [85 %-100 %] 98 %  BP: (110-166)/(69-80) 132/76     Weight: 117 kg (257 lb 15 oz)  Body mass index is 42.92 kg/m².  No intake or output data in the  24 hours ending 07/28/24 1944      Physical Exam  Constitutional:       General: She is not in acute distress.     Appearance: She is obese. She is not ill-appearing.   HENT:      Head: Normocephalic and atraumatic.   Eyes:      General: No scleral icterus.  Cardiovascular:      Rate and Rhythm: Normal rate and regular rhythm.   Pulmonary:      Effort: Pulmonary effort is normal.      Breath sounds: Normal breath sounds.   Abdominal:      General: Bowel sounds are normal. There is distension.      Tenderness: There is no abdominal tenderness. There is no guarding or rebound.      Comments: Abdomen felt tight but was not tender.    Musculoskeletal:      Right lower leg: Edema present.      Left lower leg: Edema present.      Comments: Bilateral pitting edema on lower extremities.    Skin:     General: Skin is warm and dry.      Coloration: Skin is not jaundiced.   Neurological:      Mental Status: She is alert and oriented to person, place, and time.   Psychiatric:         Mood and Affect: Mood normal.         Behavior: Behavior normal.             Significant Labs: All pertinent labs within the past 24 hours have been reviewed.    Significant Imaging: I have reviewed all pertinent imaging results/findings within the past 24 hours.

## 2024-07-28 NOTE — PLAN OF CARE
[facility] - Step[facility] Flex ([place]  Initial Discharge Assessment       Primary Care Provider: Gordy Cordero MD    Admission Diagnosis: Shortness of breath [R06.02]  CHF (congestive heart failure) [I50.9]  Chest pain [R07.9]  Acute on chronic heart failure, unspecified heart failure type [I50.9]    Admission Date: 7/26/2024  Expected Discharge Date: 7/31/2024    Transition of Care Barriers: None    Payor: /     Extended Emergency Contact Information  Primary Emergency Contact: Zully Arita  Address: [address] [place] [address]  Home Phone: [phone]  Mobile Phone: [phone]  Relation: Sister  Secondary Emergency Contact: INCOLAS GRAHAM  Mobile Phone: [phone]  Relation: Brother  Preferred language: English   needed? No    Discharge Plan A: Home with family  Discharge Plan B: Home Health      [facility]  [address] [facility] [address]  Phone: [phone] Fax: [phone]    [facility]  [address]  [facility] [address]  Phone: [phone] Fax: [phone]    [facility] [address]  [facility] - [address] [facility]  [address] [facility] [place] [address]  Phone: [phone] Fax: [phone]    [facility]  [address]  [facility] [address]  Phone: [phone] Fax: [phone]      Initial Assessment (most recent)       Adult Discharge Assessment - 07/28/24 1017          Discharge Assessment    Assessment Type Discharge Planning Assessment     Confirmed/corrected address, phone number and insurance Yes     Confirmed Demographics Correct on Facesheet     Source of Information patient     Communicated RAYO with patient/caregiver Yes     People in Home sibling(s)     Do you expect to return to your current living situation? Yes     Do you have help at home or someone to help you manage your care at home? Yes     Prior to hospitilization cognitive status:  Alert/Oriented     Current cognitive status: Alert/Oriented     Home Layout Able to live on 1st floor     Do you take prescription medications? Yes     Do you have prescription coverage? Yes     Do you have any problems affording any of your prescribed medications? No     Is the patient taking medications as prescribed? yes     Who is going to help you get home at discharge? sister     How do you get to doctors appointments? family or friend will provide     Are you on dialysis? No     Do you take coumadin? No     Discharge Plan A Home with family     Discharge Plan B Home Health     Discharge Plan discussed with: Patient     Transition of Care Barriers None     SDOH --   no       Physical Activity    On average, how many days per week do you engage in moderate to strenuous exercise (like a brisk walk)? 0 days     On average, how many minutes do you engage in exercise at this level? 0 min        Financial Resource Strain    How hard is it for you to pay for the very basics like food, housing, medical care, and heating? Not hard at all        Housing Stability    In the last 12 months, was there a time when you were not able to pay the mortgage or rent on time? No     At any time in the past 12 months, were you homeless or living in a shelter (including now)? No        Transportation Needs    Has the lack of transportation kept you from medical appointments, meetings, work or from getting things needed for daily living? No        Food Insecurity    Within the past 12 months, you worried that your food would run out before you got the money to buy more. Never true     Within the past 12 months, the food you bought just didn't last and you didn't have money to get more. Never true        Stress    Do you feel stress - tense, restless, nervous, or anxious, or unable to sleep at night because your mind is troubled all the time - these days? Not at all        Social Isolation    How often do you feel lonely or isolated  from those around you?  Never        Alcohol Use    Q1: How often do you have a drink containing alcohol? Never     Q2: How many drinks containing alcohol do you have on a typical day when you are drinking? Patient does not drink     Q3: How often do you have six or more drinks on one occasion? Never        Utilities    In the past 12 months has the electric, gas, oil, or water company threatened to shut off services in your home? No        Health Literacy    How often do you need to have someone help you when you read instructions, pamphlets, or other written material from your doctor or pharmacy? Never                      The CM met with the patient at bedside to complete the DPA.  The CM placed name and contact information on the blackboard in the patient's room.  Use preferred pharmacy / bedside delivery for any necessary  medications at the time of discharge.The patient is independent with all ADLs. The patient is not on Dialysis or Coumadin. The patient's sister will provide assistance to the patient upon discharge. The patient's sister will provide transportation upon discharge .  The CM will continue to follow for course of hospitalization.     Patient uses a walker. Patient living with sister.

## 2024-07-28 NOTE — PROGRESS NOTES
[facility] - Stepdown Flex ([place] [address])  [facility] Medicine  Progress Note    Patient Name: Vicky Alvarado  MRN: [id number]  Patient Class: IP- Inpatient   Admission Date: 7/26/2024  Length of Stay: 1 days  Attending Physician: Ramon Toure DO  Primary Care Provider: Gordy Cordero MD        Subjective:     Principal Problem:Acute on chronic diastolic heart failure        HPI:  Patient is a 59F with CHF (EF 60%, G2DD), HTN, Afib (s/p watchman procedure), T2DM (prev. on tirzepatide) presents to the ED w/ minor fall and progressively worsening SOB. Notes she couldn't get up after her fall, came in in a wheelchair. Notes she has had SOB for the past month with exertion, though now it is with rest as well. Reports orthopnea during this time frame. States she does not have any pain with breathing, but does additionally endorse occasional, intermittent chest pain. Denies abdominal pain, fever, or chills. States she has early satiety. No prior admission for HF exacerbation. Reports experiencing b/l lower extremity edema previously, but denies prior abdominal swelling. Swelling precluding normal ambulation, using walker (previously for balance) to assist d.t weakness. Has not missed doses of home lasix or antihypertensive medications. No reduced urine output at home.     In ED, normotensive, no tachycardia, some tachypnea (RR 22-28), initially on NRB -> HFNC 12L -> BIPAP -> 6L. No desaturations recorded during transitions in O2 therapy (BIPAP primarily placed for pulmonary edema seen on CXR). CBC with normocytic anemia (Hb 10.6), no leukocytosis. CMP with hypokalemia K 2.9, Cr 1.8/BUN 27 (baseline appears to be 1-1.4), Alb 2.7, no LFT elevation. Mg 1.5. , Troponin normal. VBG 7.46 / 42. CT AP with cirrhotic liver morphology, questionable GB sludge/stones, large volume ascites. CXR with hypoventilatory changes, R>L perihilar infiltrates c/f CHF. EKG NSR.  Received K replacement, 100mg IV Lasix in ED.        Overview/Hospital Course:  Patient is here for SOB due to volume overload. Patient has had CHF exacerbation in the past but also has evidence of a cirrhosis requiring a paracentesis in the ED in which they removed 7 L of fluid. The ascitic fluid was sent to lab and there is no evidence of SBP at this time. Abdominal CT shows a cirrhotic morphology of the liver. US/doppler of  liver shows evidence of cirrhosis, splenotmegally, and portal hypertension. She shows no signs of jaundice. Her liver enzymes, Alk Phos, and bilirubin are wnl. Protein studies of the ascitic fluid show increased protein leaning more towards a cardiac etiology.  CXR shows heart to be boarderline in regards to being enlarged and some perihilar infiltrate. Currently awaiting ECHO.     Interval History: Ms. Alvarado was resting in be with her CPAP and has concerns for knee pain and bilateral lower extremity.. She states her SOB has improved since her admission. An X-Ray of her knee was completed and showed degenerative joint disease.  Ascitic fluid studies show no signs of SBP at this time. Abdominal CT shows a cirrhotic morphology of the liver. US/doppler of  liver shows evidence of cirrhosis, splenomegally, and portal hypertension. She shows no signs of jaundice. Her liver enzymes, Alk Phos, and bilirubin are wnl. Protein studies of the ascitic fluid show increased protein leaning more towards a cardiac etiology.  CXR shows heart to be boarderline in regards to being enlarged and some perihilar infiltrate. Currently awaiting ECHO. Patient states they emptied 4 canisters of urine for her yesterday.     Review of Systems   Constitutional:  Negative for chills and diaphoresis.   Respiratory:  Positive for shortness of breath. Negative for cough.         SOB improved since admission.    Cardiovascular:  Positive for leg swelling. Negative for chest pain and palpitations.   Gastrointestinal:  Positive for abdominal distention and abdominal  pain. Negative for nausea and vomiting.   Genitourinary:  Negative for difficulty urinating, dysuria and hematuria.   Musculoskeletal:  Positive for arthralgias.        Complains of right knee pain. Patient fell before coming to the ED.    Neurological:  Negative for dizziness and headaches.     Objective:     Vital Signs (Most Recent):  Temp: 98.1 °F (36.7 °C) (07/28/24 1201)  Pulse: 64 (07/28/24 1201)  Resp: 18 (07/28/24 1201)  BP: 132/76 (07/28/24 1201)  SpO2: 98 % (07/28/24 1201) Vital Signs (24h Range):  Temp:  [97.6 °F (36.4 °C)-98.5 °F (36.9 °C)] 98.1 °F (36.7 °C)  Pulse:  [62-76] 64  Resp:  [18-20] 18  SpO2:  [85 %-100 %] 98 %  BP: (110-166)/(69-80) 132/76     Weight: 117 kg (257 lb 15 oz)  Body mass index is 42.92 kg/m².  No intake or output data in the 24 hours ending 07/28/24 1454      Physical Exam  Constitutional:       General: She is not in acute distress.     Appearance: She is obese. She is not ill-appearing.   HENT:      Head: Normocephalic and atraumatic.   Eyes:      General: No scleral icterus.  Cardiovascular:      Rate and Rhythm: Normal rate and regular rhythm.   Pulmonary:      Effort: Pulmonary effort is normal.      Breath sounds: Normal breath sounds.   Abdominal:      General: Bowel sounds are normal. There is distension.      Tenderness: There is no abdominal tenderness. There is no guarding or rebound.      Comments: Abdomen felt tight but was not tender.    Musculoskeletal:      Right lower leg: Edema present.      Left lower leg: Edema present.      Comments: Bilateral pitting edema on lower extremities.    Skin:     General: Skin is warm and dry.      Coloration: Skin is not jaundiced.   Neurological:      Mental Status: She is alert and oriented to person, place, and time.   Psychiatric:         Mood and Affect: Mood normal.         Behavior: Behavior normal.             Significant Labs: All pertinent labs within the past 24 hours have been reviewed.    Significant Imaging: I have  reviewed all pertinent imaging results/findings within the past 24 hours.    Assessment/Plan:      * Acute on chronic diastolic heart failure  Cardiorenal syndrome  Acute HF exacerbation of uncertain etiology (no missed doses of lasix). Last TTE with EF 60% and G2DD. Concern that she may have new R-sided HF given anasarca (large volume ascites) versus now concomitant MASH cirrhosis compounding volume overloaded state and therefore needs higher diuresis at baseline. Suspect JHONATAN d.t overload - CRS.     - Lasix 80mg IV TID (slightly conservative in case LVP can occur later)  - IM6 consulted for large-volume paracentesis  - Started spironolactone 25mg for K retention and slightly augmented diuresis  - RFP BID  - BIPAP qhs and during naps       Anasarca  Concerned anasarca may represent sequelae of cirrhosis. EGD 8/2021 with Portal hypertensive gastropathy. No varices. No abd pain, fever, chills, or confusion to raise concern for SBP or HE respectively    - Liver doppler  - Aldactone and Lasix for diuresis  - Favor albumin if hypotensive  - LVP   -Initial SBP Gram stain negative for WBC making SBP less likely  -f/u on ascities fluid anaerobic and aerobic cultures      Stage 3b chronic kidney disease  Creatine stable for now. BMP reviewed- noted Estimated Creatinine Clearance: 54.6 mL/min (based on SCr of 1.4 mg/dL). according to latest data. Based on current GFR, CKD stage is stage 3 - GFR 30-59.  Monitor UOP and serial BMP and adjust therapy as needed. Renally dose meds. Avoid nephrotoxic medications and procedures.    NAFLD (nonalcoholic fatty liver disease)  Seen by Dr. Saba with hepatology for previous chronic mild elevation in LFT, determined most likely NADLF. Now with cirrhotic morphology on CT. Previous EGD with portal gastropathy. LFT normal on admit. 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     - Liver US with Doppler  - Started on spironolactone 25mg   - Lasix 80  tid      Type 2 diabetes mellitus with diabetic polyneuropathy, without long-term current use of insulin  Patient's FSGs are controlled on current medication regimen.  Last A1c reviewed-   Lab Results   Component Value Date    HGBA1C 4.9 07/27/2024     Most recent glucose was 83  Current correctional scale   holding as BG stable    Hold Oral hypoglycemics while patient is in the hospital.    Atrial Fibrillation (paroxysmal)  Patient with Paroxysmal (<7 days) atrial fibrillation which is controlled currently with Beta Blocker (Metoprolol succinate 100 mg BID) Patient is currently in sinus rhythm.[id number] Score: 3. Anticoagulation not indicated due to has Watchman .    Essential hypertension  Chronic, controlled. Latest blood pressure and vitals reviewed-     Temp:  [97.6 °F (36.4 °C)-98.5 °F (36.9 °C)]   Pulse:  [62-76]   Resp:  [18-20]   BP: (110-166)/(69-80)   SpO2:  [85 %-100 %] .   Home meds for hypertension were reviewed and noted below.   Hypertension Medications               furosemide (LASIX) 40 MG tablet Take 1 tablet (40 mg total) by mouth 2 (two) times daily. Resume as needed for edema until followup with your PCP.    lisinopriL (PRINIVIL,ZESTRIL) 40 MG tablet Take 1 tablet (40 mg total) by mouth once daily.    metoprolol succinate (TOPROL-XL) 100 MG 24 hr tablet Take 1 tablet (100 mg total) by mouth 2 (two) times daily.    NIFEdipine (PROCARDIA-XL) 60 MG (OSM) 24 hr tablet Take 1 tablet (60 mg total) by mouth once daily.            While in the hospital, will manage blood pressure as follows; Adjust home antihypertensive regimen as follows- Continue toprol for afib, hold other antihypertensives d/t need for aggressive diuresis plus presence of JHONATAN. Did add spironolactone for K retention and now presence of ascites in setting of possible cirrhosis     Will utilize p.r.n. blood pressure medication only if patient's blood pressure greater than 220/110 and she develops symptoms such as worsening chest pain  or shortness of breath.      VTE Risk Mitigation (From admission, onward)           Ordered     heparin (porcine) injection 5,000 Units  Every 8 hours         07/27/24 0351     IP VTE HIGH RISK PATIENT  Once         07/27/24 0351     Place sequential compression device  Until discontinued         07/27/24 0351                    Discharge Planning   RAYO: 7/31/2024     Code Status: Full Code   Is the patient medically ready for discharge?:     Reason for patient still in hospital (select all that apply): Patient trending condition, Treatment, and Imaging  Discharge Plan A: Home with family                  Marah Castro MD  Department of Hospital Medicine   [facility] - [facility] ([place])

## 2024-07-28 NOTE — ASSESSMENT & PLAN NOTE
Patient with Paroxysmal (<7 days) atrial fibrillation which is controlled currently with Beta Blocker (Metoprolol succinate 100 mg BID) Patient is currently in sinus rhythm.[id number] Score: 3. Anticoagulation not indicated due to has Watchman .

## 2024-07-28 NOTE — ASSESSMENT & PLAN NOTE
Patient's FSGs are controlled on current medication regimen.  Last A1c reviewed-   Lab Results   Component Value Date    HGBA1C 4.9 07/27/2024     Most recent glucose was 83  Current correctional scale   holding as BG stable    Hold Oral hypoglycemics while patient is in the hospital.

## 2024-07-28 NOTE — RESPIRATORY THERAPY
RAPID RESPONSE RESPIRATORY CHART CHECK       Chart check completed. Please call [phone] for further concerns or assistance.

## 2024-07-28 NOTE — PHARMACY MED REC
"Admission Medication History     The home medication history was taken by Jeaneth Theodore.    You may go to "Admission" then "Reconcile Home Medications" tabs to review and/or act upon these items.     The home medication list has been updated by the Pharmacy department.   Please read ALL comments highlighted in yellow.   Please address this information as you see fit.    Feel free to contact us if you have any questions or require assistance.      The medications listed below were removed from the home medication list. Please reorder if appropriate:  Patient reports no longer taking the following medication(s):  Guaifenesin-Codeine 100-10 mg / 5 ml syrup  Hydrocodone-Acetaminophen (Norco) 5-325 mg per tablet   Ibuprofen 600 mg tablet   Megared Omega-3 Krill oil oral   Medroxyprogesterone (provera) 10 mg tablet   Pantoprazole (Protonix) 40 mg tablet   Potassium chloride SA 20 Meq tablet   Prednisone (Deltasone) 10 mg tablet   Senna 8.6 Mg Tablet   Tirzepatide (Mounjaro) 12.5 mg/0.5 ml   Tramadol (Ultram) 50 mg tablet   Trazodone (Desyrel) 100 mg tablet    Medications listed below were obtained from: Patient/family and Analytic software- [employer]  PTA Medications   Medication Sig    acetaminophen (TYLENOL) 500 MG tablet   Take 1,000 mg by mouth every evening.    albuterol (VENTOLIN HFA) 90 mcg/actuation inhaler Inhale 2 puffs into the lungs daily as needed for Wheezing or Shortness of Breath. Rescue)      ALPRAZolam (XANAX) 0.5 MG tablet    Take 0.5 mg by mouth daily as needed for Anxiety.)    aspirin (ECOTRIN) 81 MG EC tablet   Take 81 mg by mouth once daily.    atorvastatin (LIPITOR) 80 MG tablet   Take 80 mg by mouth every evening.)    b complex vitamins capsule   Take 1 capsule by mouth every other day.    colchicine (COLCRYS) 0.6 mg tablet  Take 0.6 mg by mouth daily as needed (For gout attack: Take TWO tablets by mouth, then, 2 hours later, take ONE additional tablet. Then take 1 tablet twice daily only if still " needed for gout pain.).)      DULoxetine (CYMBALTA) 60 MG capsule   Take 2 capsules (120 mg total) by mouth once daily.    lisinopriL (PRINIVIL,ZESTRIL) 40 MG tablet   Take 1 tablet (40 mg total) by mouth once daily.    metoprolol succinate (TOPROL-XL) 100 MG 24 hr tablet   Take 1 tablet (100 mg total) by mouth 2 (two) times daily.    multivitamin (THERAGRAN) per tablet   Take 1 tablet by mouth once daily.    NIFEdipine (PROCARDIA-XL) 60 MG (OSM) 24 hr tablet   Take 1 tablet (60 mg total) by mouth once daily.    omeprazole (PRILOSEC) 20 MG capsule  Take 20 mg by mouth daily as needed (hearturn/ indegestion).     Jeaneth Burfect  EXT [id number]                 .

## 2024-07-28 NOTE — ASSESSMENT & PLAN NOTE
Creatine stable for now. BMP reviewed- noted Estimated Creatinine Clearance: 54.6 mL/min (based on SCr of 1.4 mg/dL). according to latest data. Based on current GFR, CKD stage is stage 3 - GFR 30-59.  Monitor UOP and serial BMP and adjust therapy as needed. Renally dose meds. Avoid nephrotoxic medications and procedures.

## 2024-07-28 NOTE — NURSING
Pt had no events this shift. Plan of care ongoing, pt safety maintained. Call light and belongings in reach.

## 2024-07-28 NOTE — ASSESSMENT & PLAN NOTE
Seen by Dr. aSba with hepatology for previous chronic mild elevation in LFT, determined most likely NADLF. Now with cirrhotic morphology on CT. Previous EGD with portal gastropathy. LFT normal on admit. 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     - Liver US with Doppler  - Started on spironolactone 25mg   - Lasix 80 tid

## 2024-07-28 NOTE — ASSESSMENT & PLAN NOTE
Cardiorenal syndrome  Acute HF exacerbation of uncertain etiology (no missed doses of lasix). Last TTE with EF 60% and G2DD. Concern that she may have new R-sided HF given anasarca (large volume ascites) versus now concomitant MASH cirrhosis compounding volume overloaded state and therefore needs higher diuresis at baseline. Suspect JHONATAN d.t overload - CRS.     - Lasix 80mg IV TID (slightly conservative in case LVP can occur later)  - IM6 consulted for large-volume paracentesis  - Started spironolactone 25mg for K retention and slightly augmented diuresis  - RFP BID  - BIPAP qhs and during naps

## 2024-07-28 NOTE — ASSESSMENT & PLAN NOTE
Concerned anasarca may represent sequelae of cirrhosis. EGD 8/2021 with Portal hypertensive gastropathy. No varices. No abd pain, fever, chills, or confusion to raise concern for SBP or HE respectively    - Liver doppler  - Aldactone and Lasix for diuresis  - Favor albumin if hypotensive  - LVP   -Initial SBP Gram stain negative for WBC making SBP less likely  -f/u on ascities fluid anaerobic and aerobic cultures

## 2024-07-28 NOTE — HOSPITAL COURSE
Patient admitted to [facility] for volume overload and SOB. Patient has had CHF exacerbation in the past but also has evidence of a cirrhosis requiring a paracentesis in the ED in which they removed 7 L of fluid, without evidence of SBP.  Abdominal CT shows a cirrhotic morphology of the liver as well as US/doppler with the addition of splenomegaly and portal HTN.  Protein studies of the ascitic fluid show increased protein leaning more towards a cardiac etiology, however right heart cath performed 8/5/24 did not show clear evidence of a cardiac component driving her ascites. TTE on (7/29/24) showed intermediate diastolic dysfunction with EF 60-65%, minor aortic stenosis, and moderate pulmonary hypertension with estimated pulmonary artery systolic pressure is 54 mmHg.  Liver biopsy performed 8/5/24 showed cirhosis likley due to a congestive hepatopathy. Hepatology believes there may be another underlying cause behind the cirrhosis and  recommend further workup of other causes. Cytology fluid has been negative for malignant cells thus far. MRI of the abdomen and pelvis showed a 2.6 x 2.4 cm left adrenal nodule. PETH score was mildly positive; advised to no longer drink alcohol.     Patient medically stable for discharge, will require weekly paracentesis, CMP/CBC, and outpatient hepatology evaluation for transplant and TIPS consideration. Pending placement for SNF.     Patient will need to see their PCP and then connect with hepatology

## 2024-07-28 NOTE — NURSING
Patient c/o neck and knee pain caused by her fall. 9/10 pain unrelieved by tylenol. Med team notified.

## 2024-07-29 LAB
ALBUMIN SERPL BCP-MCNC: 2.4 G/DL (ref 3.5–5.2)
ALBUMIN SERPL BCP-MCNC: 2.5 G/DL (ref 3.5–5.2)
ANION GAP SERPL CALC-SCNC: 10 MMOL/L (ref 8–16)
ANION GAP SERPL CALC-SCNC: 9 MMOL/L (ref 8–16)
BASOPHILS # BLD AUTO: 0.04 K/UL (ref 0–0.2)
BASOPHILS NFR BLD: 0.8 % (ref 0–1.9)
BUN SERPL-MCNC: 22 MG/DL (ref 6–20)
BUN SERPL-MCNC: 23 MG/DL (ref 6–20)
CALCIUM SERPL-MCNC: 8.7 MG/DL (ref 8.7–10.5)
CALCIUM SERPL-MCNC: 8.8 MG/DL (ref 8.7–10.5)
CHLORIDE SERPL-SCNC: 98 MMOL/L (ref 95–110)
CHLORIDE SERPL-SCNC: 99 MMOL/L (ref 95–110)
CO2 SERPL-SCNC: 32 MMOL/L (ref 23–29)
CO2 SERPL-SCNC: 33 MMOL/L (ref 23–29)
CREAT SERPL-MCNC: 1.2 MG/DL (ref 0.5–1.4)
CREAT SERPL-MCNC: 1.3 MG/DL (ref 0.5–1.4)
DIFFERENTIAL METHOD BLD: ABNORMAL
EOSINOPHIL # BLD AUTO: 0.2 K/UL (ref 0–0.5)
EOSINOPHIL NFR BLD: 4.8 % (ref 0–8)
ERYTHROCYTE [DISTWIDTH] IN BLOOD BY AUTOMATED COUNT: 14.2 % (ref 11.5–14.5)
EST. GFR  (NO RACE VARIABLE): 47.1 ML/MIN/1.73 M^2
EST. GFR  (NO RACE VARIABLE): 51.8 ML/MIN/1.73 M^2
GLUCOSE SERPL-MCNC: 84 MG/DL (ref 70–110)
GLUCOSE SERPL-MCNC: 88 MG/DL (ref 70–110)
HCT VFR BLD AUTO: 35 % (ref 37–48.5)
HGB BLD-MCNC: 10.5 G/DL (ref 12–16)
IMM GRANULOCYTES # BLD AUTO: 0.01 K/UL (ref 0–0.04)
IMM GRANULOCYTES NFR BLD AUTO: 0.2 % (ref 0–0.5)
LYMPHOCYTES # BLD AUTO: 0.6 K/UL (ref 1–4.8)
LYMPHOCYTES NFR BLD: 12.4 % (ref 18–48)
MAGNESIUM SERPL-MCNC: 1.4 MG/DL (ref 1.6–2.6)
MCH RBC QN AUTO: 27.9 PG (ref 27–31)
MCHC RBC AUTO-ENTMCNC: 30 G/DL (ref 32–36)
MCV RBC AUTO: 93 FL (ref 82–98)
MONOCYTES # BLD AUTO: 0.6 K/UL (ref 0.3–1)
MONOCYTES NFR BLD: 11.6 % (ref 4–15)
NEUTROPHILS # BLD AUTO: 3.5 K/UL (ref 1.8–7.7)
NEUTROPHILS NFR BLD: 70.2 % (ref 38–73)
NRBC BLD-RTO: 0 /100 WBC
PHOSPHATE SERPL-MCNC: 2.9 MG/DL (ref 2.7–4.5)
PHOSPHATE SERPL-MCNC: 3.3 MG/DL (ref 2.7–4.5)
PLATELET # BLD AUTO: 179 K/UL (ref 150–450)
PMV BLD AUTO: 9.9 FL (ref 9.2–12.9)
POTASSIUM SERPL-SCNC: 3.5 MMOL/L (ref 3.5–5.1)
POTASSIUM SERPL-SCNC: 3.6 MMOL/L (ref 3.5–5.1)
RBC # BLD AUTO: 3.77 M/UL (ref 4–5.4)
SODIUM SERPL-SCNC: 140 MMOL/L (ref 136–145)
SODIUM SERPL-SCNC: 141 MMOL/L (ref 136–145)
WBC # BLD AUTO: 5.02 K/UL (ref 3.9–12.7)

## 2024-07-29 PROCEDURE — 97530 THERAPEUTIC ACTIVITIES: CPT

## 2024-07-29 PROCEDURE — 25000003 PHARM REV CODE 250

## 2024-07-29 PROCEDURE — 97165 OT EVAL LOW COMPLEX 30 MIN: CPT

## 2024-07-29 PROCEDURE — 85025 COMPLETE CBC W/AUTO DIFF WBC: CPT

## 2024-07-29 PROCEDURE — 27100171 HC OXYGEN HIGH FLOW UP TO 24 HOURS

## 2024-07-29 PROCEDURE — 63600175 PHARM REV CODE 636 W HCPCS

## 2024-07-29 PROCEDURE — 97162 PT EVAL MOD COMPLEX 30 MIN: CPT

## 2024-07-29 PROCEDURE — 99900035 HC TECH TIME PER 15 MIN (STAT)

## 2024-07-29 PROCEDURE — 97116 GAIT TRAINING THERAPY: CPT

## 2024-07-29 PROCEDURE — 83735 ASSAY OF MAGNESIUM: CPT

## 2024-07-29 PROCEDURE — 94660 CPAP INITIATION&MGMT: CPT

## 2024-07-29 PROCEDURE — 20600001 HC STEP DOWN PRIVATE ROOM

## 2024-07-29 PROCEDURE — 80069 RENAL FUNCTION PANEL: CPT | Performed by: STUDENT IN AN ORGANIZED HEALTH CARE EDUCATION/TRAINING PROGRAM

## 2024-07-29 PROCEDURE — 97112 NEUROMUSCULAR REEDUCATION: CPT

## 2024-07-29 PROCEDURE — 36415 COLL VENOUS BLD VENIPUNCTURE: CPT | Performed by: STUDENT IN AN ORGANIZED HEALTH CARE EDUCATION/TRAINING PROGRAM

## 2024-07-29 RX ORDER — HYDROXYZINE HYDROCHLORIDE 25 MG/1
25 TABLET, FILM COATED ORAL ONCE
Status: COMPLETED | OUTPATIENT
Start: 2024-07-29 | End: 2024-07-29

## 2024-07-29 RX ORDER — HEPARIN SODIUM 5000 [USP'U]/ML
7500 INJECTION, SOLUTION INTRAVENOUS; SUBCUTANEOUS EVERY 8 HOURS
Status: DISCONTINUED | OUTPATIENT
Start: 2024-07-29 | End: 2024-08-09

## 2024-07-29 RX ORDER — FUROSEMIDE 10 MG/ML
80 INJECTION INTRAMUSCULAR; INTRAVENOUS 2 TIMES DAILY
Status: DISCONTINUED | OUTPATIENT
Start: 2024-07-29 | End: 2024-08-01

## 2024-07-29 RX ORDER — MAGNESIUM SULFATE HEPTAHYDRATE 40 MG/ML
2 INJECTION, SOLUTION INTRAVENOUS
Status: COMPLETED | OUTPATIENT
Start: 2024-07-29 | End: 2024-07-29

## 2024-07-29 RX ADMIN — METOPROLOL SUCCINATE 100 MG: 100 TABLET, EXTENDED RELEASE ORAL at 09:07

## 2024-07-29 RX ADMIN — OXYCODONE HYDROCHLORIDE 5 MG: 5 TABLET ORAL at 09:07

## 2024-07-29 RX ADMIN — ASPIRIN 81 MG: 81 TABLET, COATED ORAL at 08:07

## 2024-07-29 RX ADMIN — ALPRAZOLAM 0.25 MG: 0.25 TABLET ORAL at 09:07

## 2024-07-29 RX ADMIN — Medication 6 MG: at 09:07

## 2024-07-29 RX ADMIN — MAGNESIUM SULFATE HEPTAHYDRATE 2 G: 40 INJECTION, SOLUTION INTRAVENOUS at 01:07

## 2024-07-29 RX ADMIN — ATORVASTATIN CALCIUM 80 MG: 40 TABLET, FILM COATED ORAL at 08:07

## 2024-07-29 RX ADMIN — ONDANSETRON 8 MG: 8 TABLET, ORALLY DISINTEGRATING ORAL at 01:07

## 2024-07-29 RX ADMIN — DULOXETINE HYDROCHLORIDE 120 MG: 30 CAPSULE, DELAYED RELEASE ORAL at 08:07

## 2024-07-29 RX ADMIN — SPIRONOLACTONE 25 MG: 25 TABLET ORAL at 08:07

## 2024-07-29 RX ADMIN — ACETAMINOPHEN 650 MG: 325 TABLET ORAL at 04:07

## 2024-07-29 RX ADMIN — FUROSEMIDE 80 MG: 10 INJECTION, SOLUTION INTRAVENOUS at 09:07

## 2024-07-29 RX ADMIN — MAGNESIUM SULFATE HEPTAHYDRATE 2 G: 40 INJECTION, SOLUTION INTRAVENOUS at 08:07

## 2024-07-29 RX ADMIN — OXYCODONE HYDROCHLORIDE 5 MG: 5 TABLET ORAL at 01:07

## 2024-07-29 RX ADMIN — HYDROXYZINE HYDROCHLORIDE 25 MG: 25 TABLET ORAL at 09:07

## 2024-07-29 RX ADMIN — METOPROLOL SUCCINATE 100 MG: 100 TABLET, EXTENDED RELEASE ORAL at 08:07

## 2024-07-29 RX ADMIN — HEPARIN SODIUM 7500 UNITS: 5000 INJECTION INTRAVENOUS; SUBCUTANEOUS at 09:07

## 2024-07-29 RX ADMIN — HEPARIN SODIUM 5000 UNITS: 5000 INJECTION INTRAVENOUS; SUBCUTANEOUS at 05:07

## 2024-07-29 RX ADMIN — POLYETHYLENE GLYCOL 3350 17 G: 17 POWDER, FOR SOLUTION ORAL at 08:07

## 2024-07-29 NOTE — PLAN OF CARE
Problem: Occupational Therapy  Goal: Occupational Therapy Goal  Description: Goals to be met by: 8/5/2024     Patient will increase functional independence with ADLs by performing:    UE Dressing with Set-up Assistance.  LE Dressing with Minimal Assistance.  Grooming while standing at sink with Supervision.  Toileting from toilet with Supervision for hygiene and clothing management.   Toilet transfer to toilet with Supervision.    Outcome: Progressing

## 2024-07-29 NOTE — NURSING
Pt had no events this shift. Pt had echo done at bedside this shift. VSS, worked with therapy. Pt safety maintained, call light and belongings in reach.

## 2024-07-29 NOTE — PLAN OF CARE
PT Evaluation completed and PT POC established.    Problem: Physical Therapy  Goal: Physical Therapy Goal  Description: Goals to be met by: 2024     Patient will increase functional independence with mobility by performin. Supine to sit with Stand-by Assistance  2. Sit to supine with Stand-by Assistance  3. Sit to stand transfer with Supervision  4. Bed to chair transfer with Supervision using LRAD  5. Gait  x 100 feet with Contact Guard Assistance using LRAD.   6. Ascend/descend 1 stair with no Handrails Contact Guard Assistance using LRAD.     The mobility limitation cannot be sufficiently resolved by the use of a cane.   Patient's functional mobility deficit can be sufficiently resolved with the use of a (bariatric Rolling Walker or Walker).  Patient's mobility limitation significantly impairs their ability to participate in one of more activities of daily living.  The use of a (bariatric RW or Walker) will significantly improve the patient's ability to participate in MRADLS and the patient will use it on regular basis in the home.      Outcome: Progressing

## 2024-07-29 NOTE — PT/OT/SLP EVAL
"Occupational Therapy   Co-Evaluation    Name: Vicky Alvarado  MRN: [id number]  Admitting Diagnosis: Acute on chronic diastolic heart failure  Recent Surgery: * No surgery found *      Recommendations:     Discharge Recommendations: Moderate Intensity Therapy  Discharge Equipment Recommendations:  walker, rolling  Barriers to discharge:  Decreased caregiver support    Assessment:     Vicky Alvarado is a 59 y.o. female with a medical diagnosis of Acute on chronic diastolic heart failure.  She presents with the following performance deficits affecting function: weakness, impaired endurance, impaired self care skills, impaired functional mobility, gait instability, impaired balance, pain, decreased lower extremity function, decreased safety awareness, impaired cardiopulmonary response to activity. Pt was willing to participate and tolerated session fairly overall. Pt was able to perform bed mobility with little assistance and verbal cueing. Pt had to be redirected to tasks throughout session and was easily redirectable. Pt reported pain in knees as main limiting factor during ambulation to chair. Pt tolerated sitting eob well without assistance.     Rehab Prognosis: Good; patient would benefit from acute skilled OT services to address these deficits and reach maximum level of function.       Plan:     Patient to be seen 4 x/week to address the above listed problems via self-care/home management, therapeutic activities, therapeutic exercises, neuromuscular re-education  Plan of Care Expires: 08/29/24  Plan of Care Reviewed with: patient    Subjective     Chief Complaint: knee pain  Patient/Family Comments/goals: "I haven't been truly independent since 2020."    Occupational Profile:  Living Environment: [facility], 1 MELI, t/s, standard toilet; lives c/ sister and sister's 2 adult children  Previous level of function: assist c/ ADLs; Mod I c/ ambulation  Roles and Routines: sister  Equipment Used at Home: rollator, " bath bench (electric scooter)  Assistance upon Discharge: per pt report, sister is schoolteacher and is at work during the day; children are also working during day    Pain/Comfort:  Pain Rating 1:  (not rated)  Location - Orientation 1: generalized  Location 1: knee  Pain Addressed 1: Reposition, Distraction  Pain Rating Post-Intervention 1:  (not rated)    Patients cultural, spiritual, Jehovah's witness conflicts given the current situation: no    Objective:     Communicated with: RN prior to session.  Patient found supine with telemetry, pulse ox (continuous), oxygen, blood pressure cuff upon OT entry to room.    General Precautions: Standard, fall  Orthopedic Precautions: N/A  Braces: N/A  Respiratory Status: Nasal cannula, flow 2 L/min    Occupational Performance:    Bed Mobility:    Patient completed Rolling/Turning to Right with minimum assistance and 2 persons  Patient completed Scooting/Bridging with minimum assistance and 2 persons  Patient completed Supine to Sit with minimum assistance and 2 persons      Functional Mobility/Transfers:  Patient completed Sit <> Stand Transfer with minimum assistance and of 2 persons  with  rolling walker   Chair tf: CGA c/ RW  Functional Mobility: ~5 steps to chair; CGA c/ RW; no LOB; pt c/ fear of falling  Min vc's for hand placement, sequencing c/ t/f's    Activities of Daily Living:  Upper Body Dressing: moderate assistance donned gown as robe sitting eob  Toileting: pt not needing to void this session      Cognitive/Visual Perceptual:  Cognitive/Psychosocial Skills:     -       Oriented to: Person, Place, Time, and Situation   -       Follows Commands/attention:Follows one-step commands and Follows two-step commands  -       Safety awareness/insight to disability: appears intact     Physical Exam:  Balance:    -       static sitting balance: ~12 min SBA; no LOB  Upper Extremity Range of Motion:     -       Right Upper Extremity: WFL  -       Left Upper Extremity: WFL  Upper  Extremity Strength:    -       Right Upper Extremity: grossly 5/5  -       Left Upper Extremity: grossly 5/5   Strength:    -       Right Upper Extremity: WFL  -       Left Upper Extremity: WFL  Fine Motor Coordination:    -       Intact  Left hand thumb/finger opposition skills and Right hand thumb/finger opposition skills    AMPAC 6 Click ADL:  AMPAC Total Score: 20    Treatment & Education:  Pt edu on role of OT, POC, safety when performing self care tasks , benefit of performing OOB activity, and safety when performing functional transfers and mobility.  - White board updated  - Self care tasks completed-- as noted above   - pt educated on proper sequencing for t/f's  - pt educated on proper sequencing for bed mobility     Patient left up in chair with all lines intact, call button in reach, and RN notified    Co-eval with PT to have 2 skilled therapists present to safely assess pt's functional mobility.     GOALS:   Multidisciplinary Problems       Occupational Therapy Goals          Problem: Occupational Therapy    Goal Priority Disciplines Outcome Interventions   Occupational Therapy Goal     OT, PT/OT Progressing    Description: Goals to be met by: 8/5/2024     Patient will increase functional independence with ADLs by performing:    UE Dressing with Set-up Assistance.  LE Dressing with Minimal Assistance.  Grooming while standing at sink with Supervision.  Toileting from toilet with Supervision for hygiene and clothing management.   Toilet transfer to toilet with Supervision.                         History:     Past Medical History:   Diagnosis Date    Anticoagulant long-term use     Arthritis     Atrial fibrillation     cardioversion    Atrial fibrillation with RVR     HAS WATCHMAN IN PLACE NOW    Avascular necrosis     L hand    CHF (congestive heart failure)     Chronic fatigue     Depression     Diabetes mellitus     Encounter for blood transfusion 07/22/2020    Encounter for blood transfusion  03/2022    Fatty liver     GERD (gastroesophageal reflux disease)     Hx of psychiatric care     Hypertension     Iron deficiency anemia 05/07/2022    TIBC 444 with saturated iron 8    Liver disease     Obese     Pre-diabetes 05/07/2022    Psychiatric problem     Sleep apnea     wears cpap    SOB (shortness of breath) on exertion     Weight loss     75lb intentional weight loss         Past Surgical History:   Procedure Laterality Date    ABLATION OF ARRHYTHMOGENIC FOCUS FOR ATRIAL FIBRILLATION N/A 07/20/2020    Procedure: Ablation atrial fibrillation;  Surgeon: Gabriel Hawley MD;  Location: [facility] EP LAB;  Service: Cardiology;  Laterality: N/A;  afib, PVI, RFA, REENA, SHADY, anes, MB, 3 Prep    ABLATION OF ARRHYTHMOGENIC FOCUS FOR ATRIAL FIBRILLATION N/A 01/24/2022    Procedure: Ablation atrial fibrillation;  Surgeon: Gabriel Hawley MD;  Location: [facility] EP LAB;  Service: Cardiology;  Laterality: N/A;  afib/afl, PVI (re-do)/CTI, RFA, REENA (cx if SR), SHADY, anes, MB, 3 Prep    APPLICATION OF WOUND VACUUM-ASSISTED CLOSURE DEVICE Left 08/03/2020    Procedure: APPLICATION, WOUND VAC;  Surgeon: SEMAJ Márquez III, MD;  Location: [facility] OR 2ND FLR;  Service: Peripheral Vascular;  Laterality: Left;    APPLICATION OF WOUND VACUUM-ASSISTED CLOSURE DEVICE Left 08/06/2020    Procedure: APPLICATION, WOUND VAC;  Surgeon: SEMAJ Márquez III, MD;  Location: [facility] OR 2ND FLR;  Service: Peripheral Vascular;  Laterality: Left;    CARPAL TUNNEL RELEASE Right 06/10/2020    Procedure: RELEASE, CARPAL TUNNEL - RIGHT;  Surgeon: Adelaida Hall MD;  Location: [facility] OR;  Service: Orthopedics;  Laterality: Right;  GENERAL AND REGIONAL    CARPAL TUNNEL RELEASE Left 05/05/2021    Procedure: RELEASE, CARPAL TUNNEL, LEFT;  Surgeon: Adelaida Hall MD;  Location: [facility] OR;  Service: Orthopedics;  Laterality: Left;  GENERAL & REGIONAL IN PACU    CARPECTOMY Left 9/6/2023    Procedure: CARPECTOMY;  Surgeon: Adelaida Hall MD;   Location: [facility] OR;  Service: Orthopedics;  Laterality: Left;  MAC/REGIONAL  LEFT PROXIMAL ROW    CLOSURE OF WOUND Left 08/06/2020    Procedure: CLOSURE, WOUND;  Surgeon: SEMAJ Márquez III, MD;  [address]  Service: Peripheral Vascular;  Laterality: Left;  Complex    COLONOSCOPY N/A 08/17/2021    Procedure: COLONOSCOPY Suprep;  Surgeon: Loco Deluca MD;  Location: [facility];  Service: Endoscopy;  Laterality: N/A;    ECHOCARDIOGRAM,TRANSESOPHAGEAL N/A 07/24/2023    Procedure: Transesophageal echo (REEAN) intra-procedure log documentation;  Surgeon: Dosc Diagnostic Provider;  Location: [facility] EP LAB;  Service: Cardiology;  Laterality: N/A;  s/p WM, REENA, anes, 3 Prep    ESOPHAGOGASTRODUODENOSCOPY N/A 08/17/2021    Procedure: EGD (ESOPHAGOGASTRODUODENOSCOPY);  Surgeon: Loco Deluca MD;  Location: [facility];  Service: Endoscopy;  Laterality: N/A;  Patient is schedule to have her Covid test on 08/14/2021 at the [facility] @ 9:30am. [place].    EXPLORATION OF FEMORAL ARTERY Left 07/21/2020    Procedure: EXPLORATION, ARTERY, FEMORAL;  Surgeon: SEMAJ Márquez III, MD;  Location: [address];  Service: Peripheral Vascular;  Laterality: Left;  1. Urgent Direct repair, L SFA branch laceration    FOOT SURGERY      HYSTERECTOMY      HYSTEROSCOPY WITH DILATION AND CURETTAGE OF UTERUS N/A 02/19/2022    Procedure: HYSTEROSCOPY, WITH DILATION AND CURETTAGE OF UTERUS;  Surgeon: Shane Palomo MD;  [address];  Service: OB/GYN;  Laterality: N/A;    INCISION AND DRAINAGE OF KNEE Left 05/12/2022    Procedure: INCISION AND DRAINAGE, Prepatellar bursectomy.;  Surgeon: Dre Gzumán MD;  Location: [address];  Service: Orthopedics;  Laterality: Left;    LEFT HEART CATHETERIZATION Left 02/07/2023    Procedure: Left heart cath;  Surgeon: Josiah Terry MD;  Location: [facility] CATH LAB;  Service: Cardiology;  Laterality: Left;  borderline moderate bleeding risk 6.3%    OCCLUSION  OF LEFT ATRIAL APPENDAGE N/A 06/12/2023    Procedure: Left atrial appendage occlusion;  Surgeon: Gabriel Hawley MD;  Location: [facility] EP LAB;  Service: Cardiology;  Laterality: N/A;  afib, watchman, BSCI, demi, DESTINY ibarra, 3prep    RELEASE OF ULNAR NERVE AT CUBITAL TUNNEL Bilateral     ROBOT-ASSISTED LAPAROSCOPIC ABDOMINAL HYSTERECTOMY USING DA KEIRY XI N/A 08/09/2022    Procedure: XI ROBOTIC HYSTERECTOMY;  Surgeon: Yolanda Barriga MD;  Location: [facility];  Service: OB/GYN;  Laterality: N/A;  dual console requested    ROBOT-ASSISTED LAPAROSCOPIC SALPINGO-OOPHORECTOMY Bilateral 08/09/2022    Procedure: ROBOTIC SALPINGO-OOPHORECTOMY;  Surgeon: Yolanda Barriga MD;  Location: [facility];  Service: OB/GYN;  Laterality: Bilateral;    TRANSESOPHAGEAL ECHOCARDIOGRAPHY N/A 06/12/2023    Procedure: ECHOCARDIOGRAM, TRANSESOPHAGEAL;  Surgeon: Cyril Mast MD;  Location: [facility] EP LAB;  Service: Cardiology;  Laterality: N/A;    TREATMENT OF CARDIAC ARRHYTHMIA N/A 01/29/2020    Procedure: CARDIOVERSION;  Surgeon: Gabriel Hawley MD;  Location: [facility] EP LAB;  Service: Cardiology;  Laterality: N/A;  af, demi, dccv, destiny ibarra, 345    TREATMENT OF CARDIAC ARRHYTHMIA  01/24/2022    Procedure: Cardioversion or Defibrillation;  Surgeon: Gabriel Hawley MD;  Location: [facility] EP LAB;  Service: Cardiology;;    WOUND DEBRIDEMENT Left 08/06/2020    Procedure: DEBRIDEMENT, WOUND;  Surgeon: SEMAJ Márquez III, MD;  [address]  Service: Peripheral Vascular;  Laterality: Left;       Time Tracking:     OT Date of Treatment: 07/29/24  OT Start Time: 1328  OT Stop Time: 1405  OT Total Time (min): 37 min    Billable Minutes:Evaluation 8  Therapeutic Activity 29 7/29/2024

## 2024-07-29 NOTE — RESPIRATORY THERAPY
RAPID RESPONSE RESPIRATORY CHART CHECK       Chart check completed. Please call [phone] for further concerns or assistance.

## 2024-07-29 NOTE — PT/OT/SLP EVAL
"Physical Therapy Co-Evaluation    Patient Name:  Vicky Alvarado   MRN:  [id number]    Recommendations:     Discharge Recommendations: Moderate Intensity Therapy   Discharge Equipment Recommendations:  (bariatric RW)   Barriers to discharge: Pt currently requiring increased level of assistance with mobility      Assessment:     Vicky Alvarado is a 59 y.o. female admitted with a medical diagnosis of Acute on chronic diastolic heart failure.  She presents with the following impairments/functional limitations: weakness, impaired endurance, impaired functional mobility, gait instability, impaired balance, decreased lower extremity function, decreased upper extremity function, impaired cardiopulmonary response to activity, edema.    Pleasant and in good spirits. Pt required increased time with all mobility due to B knee pain R > L. Pt reported 4 falls this year, indicating high fall risk. Pt transferred to bedside chair RW with slow and antalgic gait. Pt will benefit from skilled PT services while in house in order to address the aforementioned deficits.      Rehab Prognosis: Good; patient would benefit from acute skilled PT services to address these deficits and reach maximum level of function.    Recent Surgery: * No surgery found *      Plan:     During this hospitalization, patient to be seen 4 x/week to address the identified rehab impairments via gait training, therapeutic activities, therapeutic exercises, neuromuscular re-education and progress toward the following goals:    Plan of Care Expires:  08/29/24    Subjective     "I was supposed to get a knee replacement"    Pain/Comfort:  Pain Rating 1:  (not rated)  Location - Side 1: Bilateral  Location - Orientation 1: generalized  Location 1: knee  Pain Addressed 1: Distraction, Reposition, Cessation of Activity    Patients cultural, spiritual, Rastafarian conflicts given the current situation: no    Living Environment:  Per pt: pt, sister, and 2 adult " children reside in [facility] with 1 MELI. Pt has tub/shower combo with bench and suction GB  Prior to admission, patients level of function was mod I rollator, however recently has been getting more difficult and amb 5' at a time.  Equipment used at home: rollator, bath bench (electric scooter).  DME owned (not currently used): none.  Upon discharge, patient will have assistance from family.    Objective:     Communicated with RN prior to session.  Patient found HOB elevated with telemetry, pulse ox (continuous), oxygen, blood pressure cuff  upon PT entry to room.    General Precautions: Standard, fall  Orthopedic Precautions:N/A   Braces: N/A  Respiratory Status: High flow, flow 6 L/min    Exams:  RLE ROM: WFL  RLE Strength: grossly 3-/5  LLE ROM: WFL  LLE Strength: grossly 3/5    Functional Mobility:  Bed Mobility:     Supine to Sit: minimum assistance and of 2 persons  Transfers:     Sit to Stand:  minimum assistance with rolling walker  Bed to Chair: contact guard assistance with  rolling walker  using  Step Transfer  Gait: pt amb 6 steps RW CGA and presented with antalgic gait, decreased paulette, head down  Balance:   Good sitting balance  Fair standing balance      AM-PAC 6 CLICK MOBILITY  Total Score:15       Treatment & Education:  Discussed sitting upright in chair >1hr, pt agreeable  Discussed sitting up EOB and/or UIC with RW and RN/staff outside of therapy services    Educated pt on PT role/POC  Educated pt on importance of OOB activity and daily ambulation   Pt educated on proper body mechanics, safety techniques, and energy conservation with PT facilitation and cueing throughout session   Pt verbalized understanding      Patient left up in chair with all lines intact, call button in reach, RN notified, and OT and sister present.    GOALS:   Multidisciplinary Problems       Physical Therapy Goals          Problem: Physical Therapy    Goal Priority Disciplines Outcome Goal Variances Interventions   Physical  Therapy Goal     PT, PT/OT Progressing     Description: Goals to be met by: 2024     Patient will increase functional independence with mobility by performin. Supine to sit with Stand-by Assistance  2. Sit to supine with Stand-by Assistance  3. Sit to stand transfer with Supervision  4. Bed to chair transfer with Supervision using LRAD  5. Gait  x 100 feet with Contact Guard Assistance using LRAD.   6. Ascend/descend 1 stair with no Handrails Contact Guard Assistance using LRAD.     The mobility limitation cannot be sufficiently resolved by the use of a cane.   Patient's functional mobility deficit can be sufficiently resolved with the use of a (bariatric Rolling Walker or Walker).  Patient's mobility limitation significantly impairs their ability to participate in one of more activities of daily living.  The use of a (bariatric RW or Walker) will significantly improve the patient's ability to participate in MRADLS and the patient will use it on regular basis in the home.                           History:     Past Medical History:   Diagnosis Date    Anticoagulant long-term use     Arthritis     Atrial fibrillation     cardioversion    Atrial fibrillation with RVR     HAS WATCHMAN IN PLACE NOW    Avascular necrosis     L hand    CHF (congestive heart failure)     Chronic fatigue     Depression     Diabetes mellitus     Encounter for blood transfusion 2020    Encounter for blood transfusion 2022    Fatty liver     GERD (gastroesophageal reflux disease)     Hx of psychiatric care     Hypertension     Iron deficiency anemia 2022    TIBC 444 with saturated iron 8    Liver disease     Obese     Pre-diabetes 2022    Psychiatric problem     Sleep apnea     wears cpap    SOB (shortness of breath) on exertion     Weight loss     75lb intentional weight loss       Past Surgical History:   Procedure Laterality Date    ABLATION OF ARRHYTHMOGENIC FOCUS FOR ATRIAL FIBRILLATION N/A 2020     Procedure: Ablation atrial fibrillation;  Surgeon: Gabriel Hawley MD;  Location: [facility] EP LAB;  Service: Cardiology;  Laterality: N/A;  afib, PVI, RFA, REENA, SHADY, anes, MB, 3 Prep    ABLATION OF ARRHYTHMOGENIC FOCUS FOR ATRIAL FIBRILLATION N/A 01/24/2022    Procedure: Ablation atrial fibrillation;  Surgeon: Gabriel Hawley MD;  Location: [facility] EP LAB;  Service: Cardiology;  Laterality: N/A;  afib/afl, PVI (re-do)/CTI, RFA, REENA (cx if SR), SHADY, anes, MB, 3 Prep    APPLICATION OF WOUND VACUUM-ASSISTED CLOSURE DEVICE Left 08/03/2020    Procedure: APPLICATION, WOUND VAC;  Surgeon: SEMAJ Márquez III, MD;  Location: [facility] OR 2ND FLR;  Service: Peripheral Vascular;  Laterality: Left;    APPLICATION OF WOUND VACUUM-ASSISTED CLOSURE DEVICE Left 08/06/2020    Procedure: APPLICATION, WOUND VAC;  Surgeon: SEMAJ Márquez III, MD;  Location: [facility] OR 2ND FLR;  Service: Peripheral Vascular;  Laterality: Left;    CARPAL TUNNEL RELEASE Right 06/10/2020    Procedure: RELEASE, CARPAL TUNNEL - RIGHT;  Surgeon: Adelaida Hall MD;  Location: [facility];  Service: Orthopedics;  Laterality: Right;  GENERAL AND REGIONAL    CARPAL TUNNEL RELEASE Left 05/05/2021    Procedure: RELEASE, CARPAL TUNNEL, LEFT;  Surgeon: Adelaida Hall MD;  Location: [facility];  Service: Orthopedics;  Laterality: Left;  GENERAL & REGIONAL IN PACU    CARPECTOMY Left 9/6/2023    Procedure: CARPECTOMY;  Surgeon: Adelaida Hall MD;  Location: [facility] OR;  Service: Orthopedics;  Laterality: Left;  MAC/REGIONAL  LEFT PROXIMAL ROW    CLOSURE OF WOUND Left 08/06/2020    Procedure: CLOSURE, WOUND;  Surgeon: SEMAJ Márquez III, MD;  Location: [facility] OR 2ND FLR;  Service: Peripheral Vascular;  Laterality: Left;  Complex    COLONOSCOPY N/A 08/17/2021    Procedure: COLONOSCOPY Suprep;  Surgeon: Loco Deluca MD;  Location: [facility] ENDO;  Service: Endoscopy;  Laterality: N/A;    ECHOCARDIOGRAM,TRANSESOPHAGEAL N/A 07/24/2023    Procedure:  Transesophageal echo (DEMI) intra-procedure log documentation;  Surgeon: [facility] Provider;  Location: [facility] EP LAB;  Service: Cardiology;  Laterality: N/A;  s/p WM, DEMI, anes, 3 Prep    ESOPHAGOGASTRODUODENOSCOPY N/A 08/17/2021    Procedure: EGD (ESOPHAGOGASTRODUODENOSCOPY);  Surgeon: Loco Deluca MD;  Location: [facility] ENDO;  Service: Endoscopy;  Laterality: N/A;  Patient is schedule to have her Covid test on 08/14/2021 at the [facility] @ 9:30am. [place].    EXPLORATION OF FEMORAL ARTERY Left 07/21/2020    Procedure: EXPLORATION, ARTERY, FEMORAL;  Surgeon: SEMAJ Márquez III, MD;  Location: [address];  Service: Peripheral Vascular;  Laterality: Left;  1. Urgent Direct repair, L SFA branch laceration    FOOT SURGERY      HYSTERECTOMY      HYSTEROSCOPY WITH DILATION AND CURETTAGE OF UTERUS N/A 02/19/2022    Procedure: HYSTEROSCOPY, WITH DILATION AND CURETTAGE OF UTERUS;  Surgeon: Shane Palomo MD;  Location: [address];  Service: OB/GYN;  Laterality: N/A;    INCISION AND DRAINAGE OF KNEE Left 05/12/2022    Procedure: INCISION AND DRAINAGE, Prepatellar bursectomy.;  Surgeon: Dre Guzmán MD;  Location: [address];  Service: Orthopedics;  Laterality: Left;    LEFT HEART CATHETERIZATION Left 02/07/2023    Procedure: Left heart cath;  Surgeon: Josiah Terry MD;  Location: [facility] CATH LAB;  Service: Cardiology;  Laterality: Left;  borderline moderate bleeding risk 6.3%    OCCLUSION OF LEFT ATRIAL APPENDAGE N/A 06/12/2023    Procedure: Left atrial appendage occlusion;  Surgeon: Gabriel Hawley MD;  Location: [facility] EP LAB;  Service: Cardiology;  Laterality: N/A;  afib, watchman, BSCI, demi, anes, MB, 3prep    RELEASE OF ULNAR NERVE AT CUBITAL TUNNEL Bilateral     ROBOT-ASSISTED LAPAROSCOPIC ABDOMINAL HYSTERECTOMY USING DA KEIRY XI N/A 08/09/2022    Procedure: XI ROBOTIC HYSTERECTOMY;  Surgeon: Yolanda Barriga MD;  Location: [facility];  Service: OB/GYN;  Laterality: N/A;  dual  console requested    ROBOT-ASSISTED LAPAROSCOPIC SALPINGO-OOPHORECTOMY Bilateral 08/09/2022    Procedure: ROBOTIC SALPINGO-OOPHORECTOMY;  Surgeon: Yolanda Barriga MD;  Location: [facility];  Service: OB/GYN;  Laterality: Bilateral;    TRANSESOPHAGEAL ECHOCARDIOGRAPHY N/A 06/12/2023    Procedure: ECHOCARDIOGRAM, TRANSESOPHAGEAL;  Surgeon: Cyril Mast MD;  Location: [facility] EP LAB;  Service: Cardiology;  Laterality: N/A;    TREATMENT OF CARDIAC ARRHYTHMIA N/A 01/29/2020    Procedure: CARDIOVERSION;  Surgeon: Gabriel Hawley MD;  Location: [facility] EP LAB;  Service: Cardiology;  Laterality: N/A;  af, demi, dccv, anes, mb, 345    TREATMENT OF CARDIAC ARRHYTHMIA  01/24/2022    Procedure: Cardioversion or Defibrillation;  Surgeon: Gabriel Hawley MD;  Location: [facility] EP LAB;  Service: Cardiology;;    WOUND DEBRIDEMENT Left 08/06/2020    Procedure: DEBRIDEMENT, WOUND;  Surgeon: SEMAJ Márquez III, MD;  Location: [address];  Service: Peripheral Vascular;  Laterality: Left;       Time Tracking:     PT Received On: 07/29/24  PT Start Time: 1330     PT Stop Time: 1405  PT Total Time (min): 35 min     Billable Minutes: Evaluation 10, Gait Training 10, and Neuromuscular Re-education 15    Co-treatment performed due to patient's multiple deficits requiring two skilled therapists to appropriately and safely assess patient's strength and endurance while facilitating functional tasks in addition to accommodating for patient's activity tolerance.     07/29/2024

## 2024-07-30 ENCOUNTER — DOCUMENTATION ONLY (OUTPATIENT)
Dept: CARDIOLOGY | Facility: CLINIC | Age: 59
End: 2024-07-30
Payer: MEDICAID

## 2024-07-30 LAB
ALBUMIN FLD-MCNC: 1.5 G/DL
ALBUMIN SERPL BCP-MCNC: 3 G/DL (ref 3.5–5.2)
ALBUMIN SERPL BCP-MCNC: 3 G/DL (ref 3.5–5.2)
AMYLASE, BODY FLUID: 18 U/L
ANION GAP SERPL CALC-SCNC: 10 MMOL/L (ref 8–16)
ANION GAP SERPL CALC-SCNC: 10 MMOL/L (ref 8–16)
ASCENDING AORTA: 2.92 CM
AV INDEX (PROSTH): 0.52
AV MEAN GRADIENT: 13 MMHG
AV PEAK GRADIENT: 23 MMHG
AV VALVE AREA BY VELOCITY RATIO: 1.45 CM²
AV VALVE AREA: 1.63 CM²
AV VELOCITY RATIO: 0.46
BASOPHILS # BLD AUTO: 0.07 K/UL (ref 0–0.2)
BASOPHILS NFR BLD: 1 % (ref 0–1.9)
BILIRUB FLD-MCNC: 0.3 MG/DL
BODY FLUID SOURCE AMYLASE: NORMAL
BODY FLUID SOURCE, BILIRUBIN: NORMAL
BODY FLUID SOURCE, LDH: NORMAL
BSA FOR ECHO PROCEDURE: 2.31 M2
BUN SERPL-MCNC: 21 MG/DL (ref 6–20)
BUN SERPL-MCNC: 21 MG/DL (ref 6–20)
CALCIUM SERPL-MCNC: 9.2 MG/DL (ref 8.7–10.5)
CALCIUM SERPL-MCNC: 9.2 MG/DL (ref 8.7–10.5)
CHLORIDE SERPL-SCNC: 97 MMOL/L (ref 95–110)
CHLORIDE SERPL-SCNC: 97 MMOL/L (ref 95–110)
CO2 SERPL-SCNC: 34 MMOL/L (ref 23–29)
CO2 SERPL-SCNC: 34 MMOL/L (ref 23–29)
CREAT SERPL-MCNC: 1.1 MG/DL (ref 0.5–1.4)
CREAT SERPL-MCNC: 1.1 MG/DL (ref 0.5–1.4)
CV ECHO LV RWT: 0.31 CM
DIFFERENTIAL METHOD BLD: ABNORMAL
DOP CALC AO PEAK VEL: 2.39 M/S
DOP CALC AO VTI: 48.12 CM
DOP CALC LVOT AREA: 3.1 CM2
DOP CALC LVOT DIAMETER: 2 CM
DOP CALC LVOT PEAK VEL: 1.1 M/S
DOP CALC LVOT STROKE VOLUME: 78.59 CM3
DOP CALCLVOT PEAK VEL VTI: 25.03 CM
E WAVE DECELERATION TIME: 217.62 MSEC
E/E' RATIO: 10.1 M/S
ECHO LV POSTERIOR WALL: 0.81 CM (ref 0.6–1.1)
EOSINOPHIL # BLD AUTO: 0.3 K/UL (ref 0–0.5)
EOSINOPHIL NFR BLD: 4.4 % (ref 0–8)
ERYTHROCYTE [DISTWIDTH] IN BLOOD BY AUTOMATED COUNT: 14.5 % (ref 11.5–14.5)
EST. GFR  (NO RACE VARIABLE): 57.5 ML/MIN/1.73 M^2
EST. GFR  (NO RACE VARIABLE): 57.5 ML/MIN/1.73 M^2
FRACTIONAL SHORTENING: 34 % (ref 28–44)
GLUCOSE SERPL-MCNC: 92 MG/DL (ref 70–110)
GLUCOSE SERPL-MCNC: 92 MG/DL (ref 70–110)
HCT VFR BLD AUTO: 39.6 % (ref 37–48.5)
HGB BLD-MCNC: 11.9 G/DL (ref 12–16)
IMM GRANULOCYTES # BLD AUTO: 0.02 K/UL (ref 0–0.04)
IMM GRANULOCYTES NFR BLD AUTO: 0.3 % (ref 0–0.5)
INTERVENTRICULAR SEPTUM: 0.76 CM (ref 0.6–1.1)
IVRT: 85.63 MSEC
LA MAJOR: 6.12 CM
LA MINOR: 6.11 CM
LA WIDTH: 4.54 CM
LDH FLD L TO P-CCNC: 92 U/L
LEFT ATRIUM SIZE: 4.93 CM
LEFT ATRIUM VOLUME INDEX MOD: 42.2 ML/M2
LEFT ATRIUM VOLUME INDEX: 52.9 ML/M2
LEFT ATRIUM VOLUME MOD: 92.76 CM3
LEFT ATRIUM VOLUME: 116.34 CM3
LEFT INTERNAL DIMENSION IN SYSTOLE: 3.47 CM (ref 2.1–4)
LEFT VENTRICLE DIASTOLIC VOLUME INDEX: 59.57 ML/M2
LEFT VENTRICLE DIASTOLIC VOLUME: 131.05 ML
LEFT VENTRICLE MASS INDEX: 65 G/M2
LEFT VENTRICLE SYSTOLIC VOLUME INDEX: 22.7 ML/M2
LEFT VENTRICLE SYSTOLIC VOLUME: 49.86 ML
LEFT VENTRICULAR INTERNAL DIMENSION IN DIASTOLE: 5.23 CM (ref 3.5–6)
LEFT VENTRICULAR MASS: 143.19 G
LV LATERAL E/E' RATIO: 7.57 M/S
LV SEPTAL E/E' RATIO: 15.14 M/S
LYMPHOCYTES # BLD AUTO: 0.6 K/UL (ref 1–4.8)
LYMPHOCYTES NFR BLD: 8.5 % (ref 18–48)
MAGNESIUM SERPL-MCNC: 1.6 MG/DL (ref 1.6–2.6)
MCH RBC QN AUTO: 28.1 PG (ref 27–31)
MCHC RBC AUTO-ENTMCNC: 30.1 G/DL (ref 32–36)
MCV RBC AUTO: 93 FL (ref 82–98)
MONOCYTES # BLD AUTO: 0.7 K/UL (ref 0.3–1)
MONOCYTES NFR BLD: 10.1 % (ref 4–15)
MV PEAK E VEL: 1.06 M/S
MV STENOSIS PRESSURE HALF TIME: 63.11 MS
MV VALVE AREA P 1/2 METHOD: 3.49 CM2
NEUTROPHILS # BLD AUTO: 5.2 K/UL (ref 1.8–7.7)
NEUTROPHILS NFR BLD: 75.7 % (ref 38–73)
NRBC BLD-RTO: 0 /100 WBC
OHS QRS DURATION: 136 MS
OHS QTC CALCULATION: 478 MS
PHOSPHATE SERPL-MCNC: 2.9 MG/DL (ref 2.7–4.5)
PHOSPHATE SERPL-MCNC: 2.9 MG/DL (ref 2.7–4.5)
PISA TR MAX VEL: 3.58 M/S
PLATELET # BLD AUTO: 174 K/UL (ref 150–450)
PMV BLD AUTO: 10.8 FL (ref 9.2–12.9)
POTASSIUM SERPL-SCNC: 3.5 MMOL/L (ref 3.5–5.1)
POTASSIUM SERPL-SCNC: 3.6 MMOL/L (ref 3.5–5.1)
PROT FLD-MCNC: 2.9 G/DL
RA MAJOR: 5.2 CM
RA PRESSURE ESTIMATED: 3 MMHG
RA WIDTH: 4.3 CM
RBC # BLD AUTO: 4.24 M/UL (ref 4–5.4)
RIGHT VENTRICLE DIASTOLIC BASEL DIMENSION: 5.2 CM
RV TB RVSP: 7 MMHG
SINUS: 2.44 CM
SODIUM SERPL-SCNC: 141 MMOL/L (ref 136–145)
SODIUM SERPL-SCNC: 141 MMOL/L (ref 136–145)
SPECIMEN SOURCE: NORMAL
SPECIMEN SOURCE: NORMAL
STJ: 2.26 CM
TDI LATERAL: 0.14 M/S
TDI SEPTAL: 0.07 M/S
TDI: 0.11 M/S
TR MAX PG: 51 MMHG
TRICUSPID ANNULAR PLANE SYSTOLIC EXCURSION: 3.1 CM
TV REST PULMONARY ARTERY PRESSURE: 54 MMHG
WBC # BLD AUTO: 6.85 K/UL (ref 3.9–12.7)
Z-SCORE OF LEFT VENTRICULAR DIMENSION IN END DIASTOLE: -3.62
Z-SCORE OF LEFT VENTRICULAR DIMENSION IN END SYSTOLE: -2.17

## 2024-07-30 PROCEDURE — 63600175 PHARM REV CODE 636 W HCPCS: Mod: JZ,JG

## 2024-07-30 PROCEDURE — 20600001 HC STEP DOWN PRIVATE ROOM

## 2024-07-30 PROCEDURE — 27000221 HC OXYGEN, UP TO 24 HOURS

## 2024-07-30 PROCEDURE — 94660 CPAP INITIATION&MGMT: CPT

## 2024-07-30 PROCEDURE — 88112 CYTOPATH CELL ENHANCE TECH: CPT | Performed by: PATHOLOGY

## 2024-07-30 PROCEDURE — 80069 RENAL FUNCTION PANEL: CPT | Mod: 91 | Performed by: STUDENT IN AN ORGANIZED HEALTH CARE EDUCATION/TRAINING PROGRAM

## 2024-07-30 PROCEDURE — 27100171 HC OXYGEN HIGH FLOW UP TO 24 HOURS

## 2024-07-30 PROCEDURE — 94761 N-INVAS EAR/PLS OXIMETRY MLT: CPT

## 2024-07-30 PROCEDURE — 25000003 PHARM REV CODE 250

## 2024-07-30 PROCEDURE — 63600175 PHARM REV CODE 636 W HCPCS

## 2024-07-30 PROCEDURE — 97112 NEUROMUSCULAR REEDUCATION: CPT

## 2024-07-30 PROCEDURE — 82247 BILIRUBIN TOTAL: CPT

## 2024-07-30 PROCEDURE — 85025 COMPLETE CBC W/AUTO DIFF WBC: CPT

## 2024-07-30 PROCEDURE — 88305 TISSUE EXAM BY PATHOLOGIST: CPT | Performed by: PATHOLOGY

## 2024-07-30 PROCEDURE — 93010 ELECTROCARDIOGRAM REPORT: CPT | Mod: ,,, | Performed by: INTERNAL MEDICINE

## 2024-07-30 PROCEDURE — 0W9G3ZZ DRAINAGE OF PERITONEAL CAVITY, PERCUTANEOUS APPROACH: ICD-10-PCS | Performed by: INTERNAL MEDICINE

## 2024-07-30 PROCEDURE — 83615 LACTATE (LD) (LDH) ENZYME: CPT

## 2024-07-30 PROCEDURE — 97530 THERAPEUTIC ACTIVITIES: CPT | Mod: CQ

## 2024-07-30 PROCEDURE — 97535 SELF CARE MNGMENT TRAINING: CPT

## 2024-07-30 PROCEDURE — 97530 THERAPEUTIC ACTIVITIES: CPT

## 2024-07-30 PROCEDURE — 93005 ELECTROCARDIOGRAM TRACING: CPT

## 2024-07-30 PROCEDURE — 99900035 HC TECH TIME PER 15 MIN (STAT)

## 2024-07-30 PROCEDURE — 97112 NEUROMUSCULAR REEDUCATION: CPT | Mod: CQ

## 2024-07-30 PROCEDURE — P9047 ALBUMIN (HUMAN), 25%, 50ML: HCPCS | Mod: JZ,JG

## 2024-07-30 PROCEDURE — 82042 OTHER SOURCE ALBUMIN QUAN EA: CPT

## 2024-07-30 PROCEDURE — 83735 ASSAY OF MAGNESIUM: CPT | Performed by: STUDENT IN AN ORGANIZED HEALTH CARE EDUCATION/TRAINING PROGRAM

## 2024-07-30 PROCEDURE — 82150 ASSAY OF AMYLASE: CPT

## 2024-07-30 PROCEDURE — 84157 ASSAY OF PROTEIN OTHER: CPT

## 2024-07-30 RX ORDER — ALBUMIN HUMAN 250 G/1000ML
SOLUTION INTRAVENOUS
Status: COMPLETED
Start: 2024-07-30 | End: 2024-07-30

## 2024-07-30 RX ORDER — LIDOCAINE HYDROCHLORIDE 10 MG/ML
INJECTION, SOLUTION INFILTRATION; PERINEURAL
Status: COMPLETED | OUTPATIENT
Start: 2024-07-30 | End: 2024-07-30

## 2024-07-30 RX ADMIN — METOPROLOL SUCCINATE 100 MG: 100 TABLET, EXTENDED RELEASE ORAL at 09:07

## 2024-07-30 RX ADMIN — ATORVASTATIN CALCIUM 80 MG: 40 TABLET, FILM COATED ORAL at 08:07

## 2024-07-30 RX ADMIN — OXYCODONE HYDROCHLORIDE 5 MG: 5 TABLET ORAL at 10:07

## 2024-07-30 RX ADMIN — METOPROLOL SUCCINATE 100 MG: 100 TABLET, EXTENDED RELEASE ORAL at 08:07

## 2024-07-30 RX ADMIN — OXYCODONE HYDROCHLORIDE 5 MG: 5 TABLET ORAL at 03:07

## 2024-07-30 RX ADMIN — FUROSEMIDE 80 MG: 10 INJECTION, SOLUTION INTRAVENOUS at 08:07

## 2024-07-30 RX ADMIN — ALBUMIN (HUMAN) 25 G: 5 SOLUTION INTRAVENOUS at 10:07

## 2024-07-30 RX ADMIN — Medication 6 MG: at 09:07

## 2024-07-30 RX ADMIN — HEPARIN SODIUM 7500 UNITS: 5000 INJECTION INTRAVENOUS; SUBCUTANEOUS at 05:07

## 2024-07-30 RX ADMIN — LIDOCAINE HYDROCHLORIDE 2 ML: 10 INJECTION, SOLUTION INFILTRATION; PERINEURAL at 09:07

## 2024-07-30 RX ADMIN — DULOXETINE HYDROCHLORIDE 120 MG: 30 CAPSULE, DELAYED RELEASE ORAL at 08:07

## 2024-07-30 RX ADMIN — HEPARIN SODIUM 7500 UNITS: 5000 INJECTION INTRAVENOUS; SUBCUTANEOUS at 09:07

## 2024-07-30 RX ADMIN — SPIRONOLACTONE 25 MG: 25 TABLET ORAL at 08:07

## 2024-07-30 RX ADMIN — ALPRAZOLAM 0.25 MG: 0.25 TABLET ORAL at 11:07

## 2024-07-30 RX ADMIN — OXYCODONE HYDROCHLORIDE 5 MG: 5 TABLET ORAL at 11:07

## 2024-07-30 RX ADMIN — FUROSEMIDE 80 MG: 10 INJECTION, SOLUTION INTRAVENOUS at 09:07

## 2024-07-30 RX ADMIN — POLYETHYLENE GLYCOL 3350 17 G: 17 POWDER, FOR SOLUTION ORAL at 08:07

## 2024-07-30 RX ADMIN — HEPARIN SODIUM 7500 UNITS: 5000 INJECTION INTRAVENOUS; SUBCUTANEOUS at 01:07

## 2024-07-30 RX ADMIN — ASPIRIN 81 MG: 81 TABLET, COATED ORAL at 08:07

## 2024-07-30 NOTE — PLAN OF CARE
Patient had no acute events during shift. Paracentesis done today and 10 L was removed . Portable chest xray done and UA pending needing collection. Patient constantly removes O2 probe and heart monitor while sleeping. Tele sitter started. PT/OT today.             Problem: Adult Inpatient Plan of Care  Goal: Plan of Care Review  Outcome: Progressing  Goal: Patient-Specific Goal (Individualized)  Outcome: Progressing  Goal: Absence of Hospital-Acquired Illness or Injury  Outcome: Progressing  Goal: Optimal Comfort and Wellbeing  Outcome: Progressing  Goal: Readiness for Transition of Care  Outcome: Progressing     Problem: Bariatric Environmental Safety  Goal: Safety Maintained with Care  Outcome: Progressing     Problem: Diabetes Comorbidity  Goal: Blood Glucose Level Within Targeted Range  Outcome: Progressing     Problem: Skin Injury Risk Increased  Goal: Skin Health and Integrity  Outcome: Progressing     Problem: Wound  Goal: Optimal Coping  Outcome: Progressing  Goal: Optimal Functional Ability  Outcome: Progressing  Goal: Absence of Infection Signs and Symptoms  Outcome: Progressing  Goal: Improved Oral Intake  Outcome: Progressing  Goal: Optimal Pain Control and Function  Outcome: Progressing  Goal: Skin Health and Integrity  Outcome: Progressing  Goal: Optimal Wound Healing  Outcome: Progressing

## 2024-07-30 NOTE — ASSESSMENT & PLAN NOTE
Cardiorenal syndrome  Acute HF exacerbation of uncertain etiology (no missed doses of lasix). Last TTE with EF 60% and G2DD. Concern that she may have new R-sided HF given anasarca (large volume ascites) versus now concomitant MASH cirrhosis compounding volume overloaded state and therefore needs higher diuresis at baseline. Suspect JHONATAN d.t overload - CRS.     - Lasix 80mg IV BID (slightly conservative in case LVP can occur later)  - IM6 consulted for large-volume paracentesis  - Started spironolactone 25mg for K retention and slightly augmented diuresis  - RFP BID  - BIPAP qhs and during naps   -Scheduled for therapeutic paracentesis  -Scheduled for ECHO

## 2024-07-30 NOTE — PROGRESS NOTES
[facility] - Stepdown Flex ([place] [id number])  [facility] Medicine  Progress Note    Patient Name: Vicky Alvarado  MRN: [id number]  Patient Class: IP- Inpatient   Admission Date: 7/26/2024  Length of Stay: 2 days  Attending Physician: Ramon Toure DO  Primary Care Provider: Gordy Cordero MD        Subjective:     Principal Problem:Acute on chronic diastolic heart failure        HPI:  Patient is a 59F with CHF (EF 60%, G2DD), HTN, Afib (s/p watchman procedure), T2DM (prev. on tirzepatide) presents to the ED w/ minor fall and progressively worsening SOB. Notes she couldn't get up after her fall, came in in a wheelchair. Notes she has had SOB for the past month with exertion, though now it is with rest as well. Reports orthopnea during this time frame. States she does not have any pain with breathing, but does additionally endorse occasional, intermittent chest pain. Denies abdominal pain, fever, or chills. States she has early satiety. No prior admission for HF exacerbation. Reports experiencing b/l lower extremity edema previously, but denies prior abdominal swelling. Swelling precluding normal ambulation, using walker (previously for balance) to assist d.t weakness. Has not missed doses of home lasix or antihypertensive medications. No reduced urine output at home.     In ED, normotensive, no tachycardia, some tachypnea (RR 22-28), initially on NRB -> HFNC 12L -> BIPAP -> 6L. No desaturations recorded during transitions in O2 therapy (BIPAP primarily placed for pulmonary edema seen on CXR). CBC with normocytic anemia (Hb 10.6), no leukocytosis. CMP with hypokalemia K 2.9, Cr 1.8/BUN 27 (baseline appears to be 1-1.4), Alb 2.7, no LFT elevation. Mg 1.5. , Troponin normal. VBG 7.46 / 42. CT AP with cirrhotic liver morphology, questionable GB sludge/stones, large volume ascites. CXR with hypoventilatory changes, R>L perihilar infiltrates c/f CHF. EKG NSR.  Received K replacement, 100mg IV Lasix in ED.        Overview/Hospital Course:  Patient is here for SOB due to volume overload. Patient has had CHF exacerbation in the past but also has evidence of a cirrhosis requiring a paracentesis in the ED in which they removed 7 L of fluid. The ascitic fluid was sent to lab and there is no evidence of SBP at this time. Abdominal CT shows a cirrhotic morphology of the liver. US/doppler of  liver shows evidence of cirrhosis, splenotmegally, and portal hypertension. She shows no signs of jaundice. Her liver enzymes, Alk Phos, and bilirubin are wnl. Protein studies of the ascitic fluid show increased protein leaning more towards a cardiac etiology.  CXR shows heart to be boarderline in regards to being enlarged and some perihilar infiltrate. Currently awaiting ECHO and is scheduled for a therapeutic paracentesis tomorrow.      Interval History: Patient awaiting ECHO and therapeutic paracentesis tomorrow.     Review of Systems   Constitutional:  Negative for chills and diaphoresis.   Respiratory:  Positive for shortness of breath. Negative for cough.         SOB improved since admission, but not as good as yesterday; on nasal canula 4L   Cardiovascular:  Positive for leg swelling. Negative for chest pain and palpitations.   Gastrointestinal:  Positive for abdominal distention and abdominal pain. Negative for nausea and vomiting.        Patient reports increased abdominal distention from yesterday   Genitourinary:  Negative for difficulty urinating, dysuria and hematuria.   Musculoskeletal:  Positive for arthralgias.        Complains of right knee pain. Patient fell before coming to the ED.    Neurological:  Negative for dizziness and headaches.     Objective:     Vital Signs (Most Recent):  Temp: 98.3 °F (36.8 °C) (07/29/24 1529)  Pulse: 67 (07/29/24 1830)  Resp: 20 (07/29/24 1529)  BP: 92/61 (07/29/24 1830)  SpO2: 96 % (07/29/24 1953) Vital Signs (24h Range):  Temp:  [98.2 °F (36.8 °C)-98.5 °F (36.9 °C)] 98.3 °F (36.8  °C)  Pulse:  [61-79] 67  Resp:  [16-20] 20  SpO2:  [93 %-100 %] 96 %  BP: ()/(58-61) 92/61     Weight: 116.6 kg (257 lb)  Body mass index is 42.77 kg/m².    Intake/Output Summary (Last 24 hours) at 7/29/2024 2031  Last data filed at 7/29/2024 1051  Gross per 24 hour   Intake --   Output 1700 ml   Net -1700 ml         Physical Exam  Constitutional:       General: She is not in acute distress.     Appearance: She is obese. She is not ill-appearing.   HENT:      Head: Normocephalic and atraumatic.   Eyes:      General: No scleral icterus.  Cardiovascular:      Rate and Rhythm: Normal rate and regular rhythm.   Pulmonary:      Effort: Pulmonary effort is normal.      Breath sounds: Normal breath sounds.      Comments: Patient on 4 L nasal canula  Abdominal:      General: Bowel sounds are normal. There is distension.      Tenderness: There is no abdominal tenderness. There is no guarding or rebound.      Comments: Abdomen felt tight but was not tender. Positive fluid wave   Musculoskeletal:      Right lower leg: Edema present.      Left lower leg: Edema present.      Comments: Edema much improved. Only trace edema remaining. Patient wearing compression stockings.    Skin:     General: Skin is warm and dry.      Coloration: Skin is not jaundiced.   Neurological:      Mental Status: She is alert and oriented to person, place, and time.   Psychiatric:         Mood and Affect: Mood normal.         Behavior: Behavior normal.             Significant Labs: All pertinent labs within the past 24 hours have been reviewed.    Significant Imaging: I have reviewed all pertinent imaging results/findings within the past 24 hours.    Assessment/Plan:      * Acute on chronic diastolic heart failure  Cardiorenal syndrome  Acute HF exacerbation of uncertain etiology (no missed doses of lasix). Last TTE with EF 60% and G2DD. Concern that she may have new R-sided HF given anasarca (large volume ascites) versus now concomitant MASH  cirrhosis compounding volume overloaded state and therefore needs higher diuresis at baseline. Suspect JHONATAN d.t overload - CRS.     - Lasix 80mg IV BID (slightly conservative in case LVP can occur later)  - IM6 consulted for large-volume paracentesis  - Started spironolactone 25mg for K retention and slightly augmented diuresis  - RFP BID  - BIPAP qhs and during naps   -Scheduled for therapeutic paracentesis  -Scheduled for ECHO      Anasarca  Concerned anasarca may represent sequelae of cirrhosis. EGD 8/2021 with Portal hypertensive gastropathy. No varices. No abd pain, fever, chills, or confusion to raise concern for SBP or HE respectively    - Liver doppler  - Aldactone and Lasix for diuresis  - Favor albumin if hypotensive  - LVP   -Initial SBP Gram stain negative for WBC making SBP less likely  -f/u on ascities fluid anaerobic and aerobic cultures      Stage 3b chronic kidney disease  Improved    Creatine stable for now. BMP reviewed- noted Estimated Creatinine Clearance: 63.6 mL/min (based on SCr of 1.2 mg/dL). according to latest data. Based on current GFR, CKD stage is stage 3 - GFR 30-59.  Monitor UOP and serial BMP and adjust therapy as needed. Renally dose meds. Avoid nephrotoxic medications and procedures.    NAFLD (nonalcoholic fatty liver disease)  Seen by Dr. Saba with hepatology for previous chronic mild elevation in LFT, determined most likely NADLF. Now with cirrhotic morphology on CT. Previous EGD with portal gastropathy. LFT normal on admit. 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     - Liver US with Doppler  - Started on spironolactone 25mg   - Lasix 80 tid   -Lasix has been transitioned to 80 BID      Type 2 diabetes mellitus with diabetic polyneuropathy, without long-term current use of insulin  Patient's FSGs are controlled on current medication regimen.  Last A1c reviewed-   Lab Results   Component Value Date    HGBA1C 4.9 07/27/2024     Most recent  glucose was 83  Current correctional scale   holding as BG stable    Hold Oral hypoglycemics while patient is in the hospital.    Atrial Fibrillation (paroxysmal)  Patient with Paroxysmal (<7 days) atrial fibrillation which is controlled currently with Beta Blocker (Metoprolol succinate 100 mg BID) Patient is currently in sinus rhythm.[id number] Score: 3. Anticoagulation not indicated due to has Watchman .    Essential hypertension  Chronic, controlled. Latest blood pressure and vitals reviewed-     Temp:  [98.2 °F (36.8 °C)-98.5 °F (36.9 °C)]   Pulse:  [61-79]   Resp:  [16-20]   BP: ()/(58-61)   SpO2:  [93 %-100 %] .   Home meds for hypertension were reviewed and noted below.   Hypertension Medications               furosemide (LASIX) 40 MG tablet Take 1 tablet (40 mg total) by mouth 2 (two) times daily. Resume as needed for edema until followup with your PCP.    lisinopriL (PRINIVIL,ZESTRIL) 40 MG tablet Take 1 tablet (40 mg total) by mouth once daily.    metoprolol succinate (TOPROL-XL) 100 MG 24 hr tablet Take 1 tablet (100 mg total) by mouth 2 (two) times daily.    NIFEdipine (PROCARDIA-XL) 60 MG (OSM) 24 hr tablet Take 1 tablet (60 mg total) by mouth once daily.            While in the hospital, will manage blood pressure as follows; Adjust home antihypertensive regimen as follows- Continue toprol for afib, hold other antihypertensives d/t need for aggressive diuresis plus presence of JHONATAN. Did add spironolactone for K retention and now presence of ascites in setting of possible cirrhosis     Will utilize p.r.n. blood pressure medication only if patient's blood pressure greater than 220/110 and she develops symptoms such as worsening chest pain or shortness of breath.      VTE Risk Mitigation (From admission, onward)           Ordered     heparin (porcine) injection 7,500 Units  Every 8 hours         07/29/24 1202     IP VTE HIGH RISK PATIENT  Once         07/27/24 0351     Place sequential compression  device  Until discontinued         07/27/24 0351                    Discharge Planning   RAYO: 7/31/2024     Code Status: Full Code   Is the patient medically ready for discharge?:     Reason for patient still in hospital (select all that apply): Patient trending condition, Laboratory test, and Treatment  Discharge Plan A: Home with family                  Marah Castro MD  Department of Hospital Medicine   [facility] - Stepdown Flex ([place]-)

## 2024-07-30 NOTE — PROGRESS NOTES
[facility] ([facility]) Team notified of pt referral via Direct notification via epic in basket message, secure chat message or other .    Patient screened today 7-30-24 by provider and RN for enrollment to program.      Pt was deemed not a candidate for enrollment at this time related to patient refused.    Pt will require additional follow up planning per primary team.     If pt status, diagnosis, or treatment plan changes , please place AMB referral to [facility] ([id number]) for [facility] enrollment re-evalution.    Hospital Education done, sister and Patient states they are not interested at this time but will call me if they change their mind. They kept our H[facility] packet.

## 2024-07-30 NOTE — SUBJECTIVE & OBJECTIVE
Interval History: Ms. Alvarado had more hypoxic episodes last night. She states she woke up multiple times to find that her home BIPAP had accidentally slipped off. She is on 5 L this morning and saturating well. She is scheduled to go to a paracentesis today. ECHO showed intermediate diastolic dysfunction with EF 60-65%, minor aortic stenosis, and moderate pulmonary hypertension with estimated pulmonary artery systolic pressure is 54 mmHg.Therapeutic paracentesis performed (7/30/24) removed an additional 9.8L.      Review of Systems   Constitutional:  Negative for chills and diaphoresis.   Respiratory:  Positive for shortness of breath. Negative for cough.         SOB improved since admission, but not as good as yesterday; on nasal canula 5L   Cardiovascular:  Positive for leg swelling. Negative for chest pain and palpitations.   Gastrointestinal:  Positive for abdominal distention and abdominal pain. Negative for nausea and vomiting.        Patient reports increased abdominal distention from yesterday (prior to today's paracentesis)    Genitourinary:  Negative for difficulty urinating, dysuria and hematuria.   Musculoskeletal:  Positive for arthralgias.        Complains of right knee pain. Patient fell before coming to the ED.    Neurological:  Negative for dizziness and headaches.     Objective:     Vital Signs (Most Recent):  Temp: 98.5 °F (36.9 °C) (07/30/24 0731)  Pulse: 64 (07/30/24 0731)  Resp: 18 (07/30/24 0731)  BP: 120/67 (07/30/24 0731)  SpO2: 100 % (07/30/24 0731) Vital Signs (24h Range):  Temp:  [97.5 °F (36.4 °C)-98.5 °F (36.9 °C)] 98.5 °F (36.9 °C)  Pulse:  [60-79] 64  Resp:  [13-22] 18  SpO2:  [75 %-100 %] 100 %  BP: ()/(58-69) 120/67     Weight: 116.6 kg (257 lb)  Body mass index is 42.77 kg/m².    Intake/Output Summary (Last 24 hours) at 7/30/2024 0992  Last data filed at 7/29/2024 2050  Gross per 24 hour   Intake --   Output 1350 ml   Net -1350 ml         Physical Exam  Constitutional:        General: She is not in acute distress.     Appearance: She is obese. She is not ill-appearing.   HENT:      Head: Normocephalic and atraumatic.   Eyes:      General: No scleral icterus.  Cardiovascular:      Rate and Rhythm: Normal rate and regular rhythm.   Pulmonary:      Effort: Pulmonary effort is normal.      Breath sounds: Normal breath sounds.      Comments: Patient on 4 L nasal canula  Abdominal:      General: Bowel sounds are normal. There is distension.      Tenderness: There is no abdominal tenderness. There is no guarding or rebound.      Comments: Abdomen felt tight but was not tender. Positive fluid wave. Noticeably more distended than yesterday (prior to paracentesis).   Musculoskeletal:      Right lower leg: Edema present.      Left lower leg: Edema present.      Comments: Edema improved since admission. Slight pitting edema.  Patient wearing compression stockings.    Skin:     General: Skin is warm and dry.      Coloration: Skin is not jaundiced.   Neurological:      Mental Status: She is alert and oriented to person, place, and time.   Psychiatric:         Mood and Affect: Mood normal.         Behavior: Behavior normal.             Significant Labs: All pertinent labs within the past 24 hours have been reviewed.    Significant Imaging: I have reviewed all pertinent imaging results/findings within the past 24 hours.

## 2024-07-30 NOTE — PLAN OF CARE
Care plan reviewed.  Problem: Adult Inpatient Plan of Care  Goal: Plan of Care Review  Outcome: Progressing  Goal: Patient-Specific Goal (Individualized)  Outcome: Progressing  Goal: Absence of Hospital-Acquired Illness or Injury  Outcome: Progressing  Goal: Optimal Comfort and Wellbeing  Outcome: Progressing  Goal: Readiness for Transition of Care  Outcome: Progressing     Problem: Bariatric Environmental Safety  Goal: Safety Maintained with Care  Outcome: Progressing     Problem: Diabetes Comorbidity  Goal: Blood Glucose Level Within Targeted Range  Outcome: Progressing     Problem: Skin Injury Risk Increased  Goal: Skin Health and Integrity  Outcome: Progressing

## 2024-07-30 NOTE — ASSESSMENT & PLAN NOTE
Chronic, controlled. Latest blood pressure and vitals reviewed-     Temp:  [98.2 °F (36.8 °C)-98.5 °F (36.9 °C)]   Pulse:  [61-79]   Resp:  [16-20]   BP: ()/(58-61)   SpO2:  [93 %-100 %] .   Home meds for hypertension were reviewed and noted below.   Hypertension Medications               furosemide (LASIX) 40 MG tablet Take 1 tablet (40 mg total) by mouth 2 (two) times daily. Resume as needed for edema until followup with your PCP.    lisinopriL (PRINIVIL,ZESTRIL) 40 MG tablet Take 1 tablet (40 mg total) by mouth once daily.    metoprolol succinate (TOPROL-XL) 100 MG 24 hr tablet Take 1 tablet (100 mg total) by mouth 2 (two) times daily.    NIFEdipine (PROCARDIA-XL) 60 MG (OSM) 24 hr tablet Take 1 tablet (60 mg total) by mouth once daily.            While in the hospital, will manage blood pressure as follows; Adjust home antihypertensive regimen as follows- Continue toprol for afib, hold other antihypertensives d/t need for aggressive diuresis plus presence of JHONATAN. Did add spironolactone for K retention and now presence of ascites in setting of possible cirrhosis     Will utilize p.r.n. blood pressure medication only if patient's blood pressure greater than 220/110 and she develops symptoms such as worsening chest pain or shortness of breath.

## 2024-07-30 NOTE — PLAN OF CARE
Para complete. Pt tolerated the procedure well. 9.8 L removed. Albumin given. Site CDI. Floor RN given updated report. Taken back to room via transport.

## 2024-07-30 NOTE — RESPIRATORY THERAPY
"RAPID RESPONSE RESPIRATORY THERAPY PROACTIVE ROUNDING NOTE             Time of visit: 1531       Code Status: Full Code   : 1965  Bed: 75548/84512 A:   MRN: [id number]  Time spent at the bedside: < 15 min    SITUATION    Evaluated patient for: HFNC Compliance     BACKGROUND    Patient has a past medical history of Anticoagulant long-term use, Arthritis, Atrial fibrillation, Atrial fibrillation with RVR, Avascular necrosis, CHF (congestive heart failure), Chronic fatigue, Depression, Diabetes mellitus, Encounter for blood transfusion, Encounter for blood transfusion, Fatty liver, GERD (gastroesophageal reflux disease), psychiatric care, Hypertension, Iron deficiency anemia, Liver disease, Obese, Pre-diabetes, Psychiatric problem, Sleep apnea, SOB (shortness of breath) on exertion, and Weight loss.    24 Hours Vitals Range:  Temp:  [97.5 °F (36.4 °C)-98.5 °F (36.9 °C)]   Pulse:  [60-81]   Resp:  [12-22]   BP: ()/(57-69)   SpO2:  [68 %-100 %]     Labs:    Recent Labs     24  0510 24  1608 24  0355 24  1427    141 140 141   K 3.7 4.2 3.6 3.5    103 99 98   CO2 26 23 32* 33*   BUN 22* 22* 22* 23*   CREATININE 1.4 1.3 1.3 1.2   GLU 83 96 88 84   PHOS 3.2 3.0 3.3 2.9   MG 1.7  --  1.4*  --         No results for input(s): "PH", "PCO2", "PO2", "HCO3", "POCSATURATED", "BE" in the last 72 hours.    ASSESSMENT/INTERVENTIONS    Pt is currently on 5L HFNC     Last VS   Temp: 98.4 °F (36.9 °C) ( 120)  Pulse: 78 ( 1524)  Resp: 17 ( 151)  BP: 112/69 ( 1202)  SpO2: 100 % ( 152)    Level of Consciousness: Level of Consciousness (AVPU): alert  Respiratory Effort: Respiratory Effort: Unlabored Expansion/Accessory Muscle Usage: Expansion/Accessory Muscles/Retractions: abdominal muscle use, expansion symmetric, no use of accessory muscles  All Lung Field Breath Sounds: All Lung Fields Breath Sounds: Anterior:, Lateral:, coarse  KAITLIN Breath Sounds: crackles, " fine  LLL Breath Sounds: diminished, crackles  RUL Breath Sounds: diminished  RML Breath Sounds: diminished, crackles  RLL Breath Sounds: diminished, crackles  O2 Device/Concentration: 5L HFNC   Was the O2 device able to be weaned? Yes,   Ambu at bedside:      Active Orders   Respiratory Care    Bipap Nighttime and Daytime Nap Use     Frequency: Nighttime and Daytime Nap Use     Number of Occurrences: Until Specified     Order Questions:      Mode BIPAP      FiO2% 30      Inspiratory pressure: 18      Expiratory pressure: 10    Inhalation Treatment Q4H PRN     Frequency: Q4H PRN     Number of Occurrences: Until Specified    Pulse Oximetry Q Shift     Frequency: Q Shift     Number of Occurrences: Until Specified       RECOMMENDATIONS    We recommend: RRT Recs: Continue POC per primary team.      FOLLOW-UP    Please call back the Rapid Response RTClair RRT at x [phone] for any questions or concerns.

## 2024-07-30 NOTE — RESPIRATORY THERAPY
RAPID RESPONSE RESPIRATORY CHART CHECK       Chart check completed, Please call [phone] for further concerns or assistance.

## 2024-07-30 NOTE — ASSESSMENT & PLAN NOTE
Improved    Creatine stable for now. BMP reviewed- noted Estimated Creatinine Clearance: 63.6 mL/min (based on SCr of 1.2 mg/dL). according to latest data. Based on current GFR, CKD stage is stage 3 - GFR 30-59.  Monitor UOP and serial BMP and adjust therapy as needed. Renally dose meds. Avoid nephrotoxic medications and procedures.

## 2024-07-30 NOTE — PLAN OF CARE
Med team recommending patient for SNF placement upon discharge. After completing chart review, patient is medicaid pending. SW explained to the medical team via secure chat that patient will not qualify for any post acute placements or services due to barrier of being un-insured. Medical team understood. SW to follow for possible further needs.    KLAUS Amaya  [facility] [place] [facility]  [phone]

## 2024-07-30 NOTE — ASSESSMENT & PLAN NOTE
Cardiorenal syndrome  Acute HF exacerbation of uncertain etiology (no missed doses of lasix). Last TTE with EF 60% and G2DD. Concern that she may have new R-sided HF given anasarca (large volume ascites) versus now concomitant MASH cirrhosis compounding volume overloaded state and therefore needs higher diuresis at baseline. Suspect JHONATAN d.t overload - CRS.     - Lasix 80mg IV BID (slightly conservative in case LVP can occur later)  - IM6 consulted for large-volume paracentesis  - Started spironolactone 25mg for K retention and slightly augmented diuresis  - RFP BID  - BIPAP qhs and during naps   -Scheduled for therapeutic paracentesis  -Paracentesis (7/30/24) removed 9.8L  -ECHO revealed intermediate diastolic dysfunction with EF 60-65%, minor aortic stenosis, and moderate pulmonary hypertension with estimated pulmonary artery systolic pressure is 54 mmHg.Paracentesis performed (7/30/24) removed an additional 9.8L.

## 2024-07-30 NOTE — PT/OT/SLP PROGRESS
Physical Therapy Treatment    Co tx performed with OT due to patient need for 2 skilled therapist to ensure patient and staff safety and to accommondate for activity tolerance.      Patient Name:  Vicky Alvarado   MRN:  [id number]    Recommendations:     Discharge Recommendations: Moderate Intensity Therapy (rec change for rehab in place)   Discharge Equipment Recommendations:  (bariatric RW)  Barriers to discharge: Pt currently requiring increased level of assistance with mobility     Assessment:     Vicky Alvarado is a 59 y.o. female admitted with a medical diagnosis of Acute on chronic diastolic heart failure.  She presents with the following impairments/functional limitations: weakness, impaired endurance, impaired functional mobility, gait instability, impaired balance, decreased lower extremity function, decreased upper extremity function, impaired cardiopulmonary response to activity, edema Pt tolerated poorly due to pt's anxiety, pt with co-treat today due to activity tolerance and safety. Pt with increased RLE pain throughout session. PTA/OT attempted multiple different transfers to bedside commode without success. Pt required Max of 1 person for Rolling L/R and required Max of 2 persons for all other bed/ functional mobility. Transport came for pt and required assistance transferring pt from bed to stretcher, Pt was assisted from bed to stretcher with draw-sheet and with Max A x 4 persons due to inability to transfer with assistance 2 therapists. pt would continue to benefit from skilled PT services to improve overall functional mobility, strength and endurance.    Rehab Prognosis: Good; patient would benefit from acute skilled PT services to address these deficits and reach maximum level of function.    Recent Surgery: * No surgery found *      Plan:     During this hospitalization, patient to be seen 4 x/week to address the identified rehab impairments via gait training, therapeutic  activities, therapeutic exercises, neuromuscular re-education and progress toward the following goals:    Plan of Care Expires:  08/29/24    Subjective     Chief Complaint: RLE pain  Patient/Family Comments/goals: N/A  Pain/Comfort:  Pain Rating 1:  (not rated although increased amounts of pain to touch/ with movement)  Location - Side 1: Right  Location - Orientation 1: generalized  Location 1: leg  Pain Addressed 1: Distraction, Reposition, Cessation of Activity  Pain Rating Post-Intervention 1:  (not rated)      Objective:     Communicated with OT/NSG prior to session.  Patient found sitting edge of bed with telemetry, pulse ox (continuous), oxygen, blood pressure cuff upon PT entry to room.     General Precautions: Standard, fall  Orthopedic Precautions: N/A  Braces: N/A  Respiratory Status: High flow, flow 5 L/min  Functional Mobility:  Bed Mobility:     Rolling Left:  maximal assistance with bed rail  Rolling Right: maximal assistance with bed rail   Scooting: maximal assistance and of 2 persons to HOB with draw-sheet. Pt scooted from bed to stretcher with Max A x 4 persons and scoot pivot  Sit to Supine: maximal assistance and of 2 persons  Transfers:     Sit to Stand:  maximal assistance and of 2 persons with hand-held assist and rolling walker, both unsuccessful due to RLE pain and anxiety  Bed to Bedside Commode: PTA/OT attempted Stand with RW and Squat pivot with no AD. Both attempted with Max A x 2 persons although unsuccessful  Bed to stretcher: Max A x 4 persons with draw-sheet placed underneath pt and pt scooted from bed to stretcher  Balance: sitting EOB SBA no rail       AM-PAC 6 CLICK MOBILITY  Turning over in bed (including adjusting bedclothes, sheets and blankets)?: 3  Sitting down on and standing up from a chair with arms (e.g., wheelchair, bedside commode, etc.): 3  Moving from lying on back to sitting on the side of the bed?: 3  Moving to and from a bed to a chair (including a wheelchair)?:  3  Need to walk in hospital room?: 2  Climbing 3-5 steps with a railing?: 1  Basic Mobility Total Score: 15       Treatment & Education:  Patient educated on role of therapy, goals of session, and benefits of out of bed mobility.   Instructed on use of call button and importance of calling nursing staff for assistance with mobility   Questions/concerns addressed within PTA scope of practice  Pt verbalized understanding.    Patient left  on stretcher with transport  with all lines intact..    GOALS:   Multidisciplinary Problems       Physical Therapy Goals          Problem: Physical Therapy    Goal Priority Disciplines Outcome Goal Variances Interventions   Physical Therapy Goal     PT, PT/OT Progressing     Description: Goals to be met by: 2024     Patient will increase functional independence with mobility by performin. Supine to sit with Stand-by Assistance  2. Sit to supine with Stand-by Assistance  3. Sit to stand transfer with Supervision  4. Bed to chair transfer with Supervision using LRAD  5. Gait  x 100 feet with Contact Guard Assistance using LRAD.   6. Ascend/descend 1 stair with no Handrails Contact Guard Assistance using LRAD.     The mobility limitation cannot be sufficiently resolved by the use of a cane.   Patient's functional mobility deficit can be sufficiently resolved with the use of a (bariatric Rolling Walker or Walker).  Patient's mobility limitation significantly impairs their ability to participate in one of more activities of daily living.  The use of a (bariatric RW or Walker) will significantly improve the patient's ability to participate in MRADLS and the patient will use it on regular basis in the home.                           Time Tracking:     PT Received On: 24  PT Start Time: 852     PT Stop Time: 930  PT Total Time (min): 38 min     Billable Minutes: Therapeutic Activity 30 and Neuromuscular Re-education 08    Treatment Type: Treatment  PT/PTA: PTA     Number  of PTA visits since last PT visit: 1 07/30/2024

## 2024-07-30 NOTE — ASSESSMENT & PLAN NOTE
Patient with Paroxysmal (<7 days) atrial fibrillation which is controlled currently with Beta Blocker (Metoprolol succinate 100 mg BID) Patient is currently in sinus rhythm.[id number] Score: 3. Anticoagulation not indicated due to has Watchman .

## 2024-07-30 NOTE — ASSESSMENT & PLAN NOTE
Patient with Paroxysmal (<7 days) atrial fibrillation which is controlled currently with Beta Blocker (Metoprolol succinate 100 mg BID) Patient is currently in sinus rhythm.[id number] Score: 3. Anticoagulation not indicated due to has Watchman .

## 2024-07-30 NOTE — CARE UPDATE
Upon arrival pt on room air with HFNC in bed - SpO2 68% on RA. Pt also had taken continuous pulse ox off. Pt stated she was not short of breath nor having trouble breathing. NC placed back on pt with liter flow increased from 5L to 10L in efforts for pt to recover - SpO2 93% on 10L.   RN called to bedside and stated she takes everything off when she's sleeping. Rapid RT made aware of event as well.  Will wean pt's O2 as tolerated.

## 2024-07-30 NOTE — PT/OT/SLP PROGRESS
Occupational Therapy   Co-Treatment    Name: Vicky Alvarado  MRN: [id number]  Admitting Diagnosis:  Acute on chronic diastolic heart failure       Recommendations:     Discharge Recommendations: Moderate Intensity Therapy  Discharge Equipment Recommendations:   (bariatric RW)  Barriers to discharge:  Decreased caregiver support    Assessment:     Vicky Alvarado is a 59 y.o. female with a medical diagnosis of Acute on chronic diastolic heart failure. Performance deficits affecting function are weakness, impaired endurance, impaired functional mobility, gait instability, impaired balance, decreased lower extremity function, decreased upper extremity function, impaired cardiopulmonary response to activity, edema. Pt agreeable to therapy and tolerated well. Upon OT arrival. Pt sitting  EOB reporting needing to use restroom. OT/PTA with multiple attempts  to perform bed>BSC transfer but pt required increased assistance and with increased anxiety at this date.  Session limited by pt's  anxiety. Upon end of session, transport present to take pt to paracentesis. OT/PTA assisted RN and transport on transferring pt from bed>stretcher.  Pt remains limited in ADLs, functional mobility, and functional transfers. Patient continues to demonstrate the need for moderate intensity therapy on a daily basis post acute exhibited by decreased independence with self-care and functional mobility. OT increased intensity 2/2 pt needing rigorous therapeutic intervention for safety and (I) in ADL performance and mobility.       Co-treat performed due to acuity and complexity of pt's medical status with 2 skilled disciplines needed to optimize pts occupational performance and  decreased activity tolerance.     Rehab Prognosis:  Good; patient would benefit from acute skilled OT services to address these deficits and reach maximum level of function.       Plan:     Patient to be seen 5 x/week to address the above listed problems via  "self-care/home management, therapeutic activities, therapeutic exercises, neuromuscular re-education  Plan of Care Expires: 08/29/24  Plan of Care Reviewed with: patient    Subjective     Chief Complaint: pain  Patient/Family Comments/goals: "I want to do it. I'm so frustrated."   Pain/Comfort:  Pain Rating 1:  (not rated)  Location - Side 1: Bilateral  Location - Orientation 1: generalized  Location 1: leg  Pain Addressed 1: Distraction, Reposition, Nurse notified  Pain Rating Post-Intervention 1: 10/10    Objective:     Communicated with: RN prior to session.  Patient found sitting edge of bed with telemetry, pulse ox (continuous), oxygen, blood pressure cuff upon OT entry to room.    General Precautions: Standard, fall    Orthopedic Precautions:N/A  Braces: N/A  Respiratory Status: Nasal cannula, flow 5 L/min     Occupational Performance:     Bed Mobility:    Patient completed Scooting/Bridging with total assistance and 4 persons transferring bed>stretcher  Patient completed Sit to Supine with Max A x2 at trunk and BLE  Rolling R/L Max A x1    Functional Mobility/Transfers:  Patient completed Sit <> Stand Transfer with maximal assistance and of 2 persons  with  hand-held assist and rolling walker   1 attempt with RW and clearance of 75% with inability to come to full stand   2nd attempt HHA and clearance of 50% with inability to come to full stand   Functional Mobility: Pt attempted to perform BSC transfer using a stand pivot and squat pivot technique. Pt attempted 3x and unable to perform 2/2 increased anxiety, poor body habitus, and poor command following. Pt attempted to perform leftward scooting and able to scoot a small amount but unable to clear bottom from EOB. Increased time  required for trials and to assist pt with anxiety relief.   Multiple cues for proper body mechanics     Activities of Daily Living:  Toileting: total assistance Pt soiled after using purewick and required bed level toileting " assistance to perform perineal hygiene       [facility] 6 Click ADL: 17    Treatment & Education:  -Education on energy conservation and task modification to maximize safety and (I) during ADLs and mobility  -Education on importance of OOB activity to improve overall activity tolerance and promote recovery  -Pt educated to call for assistance and to transfer with hospital staff only  -Provided education regarding role of OT, POC, & discharge recommendations with pt verbalizing understanding.  Pt had no further questions & when asked whether there were any concerns pt reported none.      Patient left  on stretcher with transport and RN present      GOALS:   Multidisciplinary Problems       Occupational Therapy Goals          Problem: Occupational Therapy    Goal Priority Disciplines Outcome Interventions   Occupational Therapy Goal     OT, PT/OT Progressing    Description: Goals to be met by: 8/5/2024     Patient will increase functional independence with ADLs by performing:    UE Dressing with Set-up Assistance.  LE Dressing with Minimal Assistance.  Grooming while standing at sink with Supervision.  Toileting from toilet with Supervision for hygiene and clothing management.   Toilet transfer to toilet with Supervision.                         Time Tracking:     OT Date of Treatment: 07/30/24  OT Start Time: 0852  OT Stop Time: 0930  OT Total Time (min): 38 min    Billable Minutes:Self Care/Home Management 10 min  Therapeutic Activity 15 min  Neuromuscular Re-education 13 min    OT/GASPER: OT          7/30/2024

## 2024-07-30 NOTE — CARE UPDATE
When returning to pt's room to reaccess sats, family member at bedside states pt does not wear home O2 and does not have O2 bled inline with Cpap at home either.     Pt currently has O2 inline with Cpap QHS.

## 2024-07-30 NOTE — H&P
Inpatient Radiology Pre-procedure Note    History of Present Illness:  Vicky Alvarado is a 59 y.o. female who presents for US guided paracentesis.    Admission H&P reviewed.  Past Medical History:   Diagnosis Date    Anticoagulant long-term use     Arthritis     Atrial fibrillation     cardioversion    Atrial fibrillation with RVR     HAS WATCHMAN IN PLACE NOW    Avascular necrosis     L hand    CHF (congestive heart failure)     Chronic fatigue     Depression     Diabetes mellitus     Encounter for blood transfusion 07/22/2020    Encounter for blood transfusion 03/2022    Fatty liver     GERD (gastroesophageal reflux disease)     Hx of psychiatric care     Hypertension     Iron deficiency anemia 05/07/2022    TIBC 444 with saturated iron 8    Liver disease     Obese     Pre-diabetes 05/07/2022    Psychiatric problem     Sleep apnea     wears cpap    SOB (shortness of breath) on exertion     Weight loss     75lb intentional weight loss     Past Surgical History:   Procedure Laterality Date    ABLATION OF ARRHYTHMOGENIC FOCUS FOR ATRIAL FIBRILLATION N/A 07/20/2020    Procedure: Ablation atrial fibrillation;  Surgeon: Gabriel Hawley MD;  Location: [facility] EP LAB;  Service: Cardiology;  Laterality: N/A;  afib, PVI, RFA, REENA, SHADY, anes, MB, 3 Prep    ABLATION OF ARRHYTHMOGENIC FOCUS FOR ATRIAL FIBRILLATION N/A 01/24/2022    Procedure: Ablation atrial fibrillation;  Surgeon: Gabriel Hawley MD;  Location: [facility] EP LAB;  Service: Cardiology;  Laterality: N/A;  afib/afl, PVI (re-do)/CTI, RFA, REENA (cx if SR), SHADY, anes, MB, 3 Prep    APPLICATION OF WOUND VACUUM-ASSISTED CLOSURE DEVICE Left 08/03/2020    Procedure: APPLICATION, WOUND VAC;  Surgeon: SEMAJ Márquez III, MD;  Location: [facility] OR [address] [place] [address];  Service: Peripheral Vascular;  Laterality: Left;    APPLICATION OF WOUND VACUUM-ASSISTED CLOSURE DEVICE Left 08/06/2020    Procedure: APPLICATION, WOUND VAC;  Surgeon: SEMAJ Márquez III, MD;  Location: [facility] OR  2ND FLR;  Service: Peripheral Vascular;  Laterality: Left;    CARPAL TUNNEL RELEASE Right 06/10/2020    Procedure: RELEASE, CARPAL TUNNEL - RIGHT;  Surgeon: Adelaida Hall MD;  Location: [facility] OR;  Service: Orthopedics;  Laterality: Right;  GENERAL AND REGIONAL    CARPAL TUNNEL RELEASE Left 05/05/2021    Procedure: RELEASE, CARPAL TUNNEL, LEFT;  Surgeon: Adelaida Hall MD;  Location: [facility] OR;  Service: Orthopedics;  Laterality: Left;  GENERAL & REGIONAL IN PACU    CARPECTOMY Left 9/6/2023    Procedure: CARPECTOMY;  Surgeon: Adelaida Hall MD;  Location: [facility] OR;  Service: Orthopedics;  Laterality: Left;  MAC/REGIONAL  LEFT PROXIMAL ROW    CLOSURE OF WOUND Left 08/06/2020    Procedure: CLOSURE, WOUND;  Surgeon: SEMAJ Márquez III, MD;  [address]  Service: Peripheral Vascular;  Laterality: Left;  Complex    COLONOSCOPY N/A 08/17/2021    Procedure: COLONOSCOPY Suprep;  Surgeon: Loco Deluca MD;  Location: [facility];  Service: Endoscopy;  Laterality: N/A;    ECHOCARDIOGRAM,TRANSESOPHAGEAL N/A 07/24/2023    Procedure: Transesophageal echo (REENA) intra-procedure log documentation;  Surgeon: [facility];  Location: [facility] EP LAB;  Service: Cardiology;  Laterality: N/A;  s/p WM, REENA, anes, 3 Prep    ESOPHAGOGASTRODUODENOSCOPY N/A 08/17/2021    Procedure: EGD (ESOPHAGOGASTRODUODENOSCOPY);  Surgeon: Loco Deluca MD;  Location: [facility];  Service: Endoscopy;  Laterality: N/A;  Patient is schedule to have her Covid test on 08/14/2021 at the [facility] @ 9:30am. [place].    EXPLORATION OF FEMORAL ARTERY Left 07/21/2020    Procedure: EXPLORATION, ARTERY, FEMORAL;  Surgeon: SEMAJ Márquez III, MD;  Location: [facility] 2ND FLR;  Service: Peripheral Vascular;  Laterality: Left;  1. Urgent Direct repair, L SFA branch laceration    FOOT SURGERY      HYSTERECTOMY      HYSTEROSCOPY WITH DILATION AND CURETTAGE OF UTERUS N/A 02/19/2022    Procedure: HYSTEROSCOPY,  WITH DILATION AND CURETTAGE OF UTERUS;  Surgeon: Shane Palomo MD;  Location: [facility] OR [facility] FLR;  Service: OB/GYN;  Laterality: N/A;    INCISION AND DRAINAGE OF KNEE Left 05/12/2022    Procedure: INCISION AND DRAINAGE, Prepatellar bursectomy.;  Surgeon: Dre Guzmán MD;  Location: [facility] 2ND FLR;  Service: Orthopedics;  Laterality: Left;    LEFT HEART CATHETERIZATION Left 02/07/2023    Procedure: Left heart cath;  Surgeon: Josiah Terry MD;  Location: [facility] CATH LAB;  Service: Cardiology;  Laterality: Left;  borderline moderate bleeding risk 6.3%    OCCLUSION OF LEFT ATRIAL APPENDAGE N/A 06/12/2023    Procedure: Left atrial appendage occlusion;  Surgeon: Gabriel Hawley MD;  Location: [facility] EP LAB;  Service: Cardiology;  Laterality: N/A;  afib, watchman, BSCI, demi, fred, MB, 3prep    RELEASE OF ULNAR NERVE AT CUBITAL TUNNEL Bilateral     ROBOT-ASSISTED LAPAROSCOPIC ABDOMINAL HYSTERECTOMY USING DA KEIRY XI N/A 08/09/2022    Procedure: XI ROBOTIC HYSTERECTOMY;  Surgeon: Yolanda Barriga MD;  Location: [facility];  Service: OB/GYN;  Laterality: N/A;  dual console requested    ROBOT-ASSISTED LAPAROSCOPIC SALPINGO-OOPHORECTOMY Bilateral 08/09/2022    Procedure: ROBOTIC SALPINGO-OOPHORECTOMY;  Surgeon: Yolanda Barriga MD;  Location: [facility];  Service: OB/GYN;  Laterality: Bilateral;    TRANSESOPHAGEAL ECHOCARDIOGRAPHY N/A 06/12/2023    Procedure: ECHOCARDIOGRAM, TRANSESOPHAGEAL;  Surgeon: Cyril Mast MD;  Location: [facility] EP LAB;  Service: Cardiology;  Laterality: N/A;    TREATMENT OF CARDIAC ARRHYTHMIA N/A 01/29/2020    Procedure: CARDIOVERSION;  Surgeon: Gabriel Hawely MD;  Location: [facility] EP LAB;  Service: Cardiology;  Laterality: N/A;  af, demi, dccv, anes, mb, 345    TREATMENT OF CARDIAC ARRHYTHMIA  01/24/2022    Procedure: Cardioversion or Defibrillation;  Surgeon: Gabriel Hawley MD;  Location: [facility] EP LAB;  Service: Cardiology;;    WOUND DEBRIDEMENT Left 08/06/2020    Procedure: DEBRIDEMENT,  WOUND;  Surgeon: SEMAJ Márquez III, MD;  Location: [facility] OR [address] [place] [address];  Service: Peripheral Vascular;  Laterality: Left;       Review of Systems:   As documented in primary team H&P    Home Meds:   Prior to Admission medications    Medication Sig Start Date End Date Taking? Authorizing Provider   acetaminophen (TYLENOL) 500 MG tablet Take 2 tablets (1,000 mg total) by mouth 2 (two) times daily as needed for Pain. Begin post op day 3.  Patient taking differently: Take 1,000 mg by mouth every evening. Begin post op day 3. 9/5/23  Yes Echo Aguirre PA-C   albuterol (VENTOLIN HFA) 90 mcg/actuation inhaler Inhale 2 puffs into the lungs every 6 (six) hours as needed for Wheezing. Rescue  Patient taking differently: Inhale 2 puffs into the lungs daily as needed for Wheezing or Shortness of Breath. Rescue 12/14/23 12/13/24 Yes Gordy Cordero MD   ALPRAZolam (XANAX) 0.5 MG tablet Take 1 tablet (0.5 mg total) by mouth 2 (two) times daily as needed for Anxiety.  Patient taking differently: Take 0.5 mg by mouth daily as needed for Anxiety. 2/8/24  Yes Gordy Cordero MD   aspirin (ECOTRIN) 81 MG EC tablet Take 81 mg by mouth once daily.   Yes Provider, Historical   atorvastatin (LIPITOR) 80 MG tablet Take 1 tablet (80 mg total) by mouth once daily.  Patient taking differently: Take 80 mg by mouth every evening. 2/7/24 2/6/25 Yes Gordy Cordero MD   b complex vitamins capsule Take 1 capsule by mouth every other day.   Yes Provider, Historical   colchicine (COLCRYS) 0.6 mg tablet For gout attack: Take TWO tablets by mouth, then, 2 hours later, take ONE additional tablet. Then take 1 tablet twice daily only if still needed for gout pain.  Patient taking differently: Take 0.6 mg by mouth daily as needed (For gout attack: Take TWO tablets by mouth, then, 2 hours later, take ONE additional tablet. Then take 1 tablet twice daily only if still needed for gout pain.). 12/13/23  Yes Gordy Cordero MD   DULoxetine (CYMBALTA) 60  MG capsule Take 2 capsules (120 mg total) by mouth once daily. 11/3/23  Yes Gordy Cordero MD   lisinopriL (PRINIVIL,ZESTRIL) 40 MG tablet Take 1 tablet (40 mg total) by mouth once daily. 6/27/24 6/27/25 Yes Gordy Cordero MD   metoprolol succinate (TOPROL-XL) 100 MG 24 hr tablet Take 1 tablet (100 mg total) by mouth 2 (two) times daily. 1/17/24  Yes Marah Hunter NP   multivitamin (THERAGRAN) per tablet Take 1 tablet by mouth once daily.   Yes Provider, Historical   NIFEdipine (PROCARDIA-XL) 60 MG (OSM) 24 hr tablet Take 1 tablet (60 mg total) by mouth once daily. 7/25/24 2/20/25 Yes Gordy Cordero MD   omeprazole (PRILOSEC) 20 MG capsule TAKE 1 CAPSULE BY MOUTH ONCE DAILY  Patient taking differently: Take 20 mg by mouth daily as needed (hearturn/ indegestion). 7/25/24  Yes Gordy Cordero MD   medroxyPROGESTERone (PROVERA) 10 MG tablet Take 2 tablets (20 mg total) by mouth 3 (three) times daily. 7/13/22 8/9/22  Yolanda Barriag MD   pantoprazole (PROTONIX) 40 MG tablet Take 1 tablet (40 mg total) by mouth once daily.  Patient not taking: Reported on 2/18/2022 1/25/22 2/27/22  Geovanna Garcia MD     Scheduled Meds:    aspirin  81 mg Oral Daily    atorvastatin  80 mg Oral Daily    DULoxetine  120 mg Oral Daily    furosemide (LASIX) injection  80 mg Intravenous BID    heparin (porcine)  7,500 Units Subcutaneous Q8H    metoprolol succinate  100 mg Oral BID    polyethylene glycol  17 g Oral Daily    potassium bicarbonate  40 mEq Oral Once    spironolactone  25 mg Oral Daily     Continuous Infusions:   PRN Meds:  Current Facility-Administered Medications:     acetaminophen, 650 mg, Oral, Q4H PRN    albuterol-ipratropium, 3 mL, Nebulization, Q4H PRN    ALPRAZolam, 0.25 mg, Oral, Daily PRN    aluminum-magnesium hydroxide-simethicone, 30 mL, Oral, QID PRN    dextrose 10%, 12.5 g, Intravenous, PRN    dextrose 10%, 25 g, Intravenous, PRN    glucagon (human recombinant), 1 mg, Intramuscular, PRN     glucose, 16 g, Oral, PRN    glucose, 24 g, Oral, PRN    melatonin, 6 mg, Oral, Nightly PRN    naloxone, 0.02 mg, Intravenous, PRN    ondansetron, 8 mg, Oral, Q8H PRN    oxyCODONE, 5 mg, Oral, Q4H PRN    prochlorperazine, 5 mg, Intravenous, Q6H PRN    simethicone, 1 tablet, Oral, QID PRN    sodium chloride 0.9%, 10 mL, Intravenous, Q12H PRN  Anticoagulants/Antiplatelets: no anticoagulation    Allergies:   Review of patient's allergies indicates:   Allergen Reactions    Flecainide Shortness Of Breath and Swelling    Shellfish containing products Other (See Comments)     Makes gout terrible    Vancomycin analogues Hives, Itching and Rash     Sedation Hx: have not been any systemic reactions    Vitals:  Temp: 98.5 °F (36.9 °C) (07/30/24 0731)  Pulse: 64 (07/30/24 0731)  Resp: 18 (07/30/24 0731)  BP: 120/67 (07/30/24 0731)  SpO2: 100 % (07/30/24 0731)     Physical Exam:  ASA: 2  Mallampati: n/a    General: no acute distress  Mental Status: alert and oriented to person, place and time  HEENT: normocephalic, atraumatic  Chest: unlabored breathing  Heart: regular heart rate  Abdomen: distended  Extremity: moves all extremities    Plan: US guided paracentesis  Sedation Plan: Local    Shannen Callahan PA-C  Interventional Radiology  [phone]

## 2024-07-30 NOTE — SUBJECTIVE & OBJECTIVE
Interval History: Patient awaiting ECHO and therapeutic paracentesis tomorrow.     Review of Systems   Constitutional:  Negative for chills and diaphoresis.   Respiratory:  Positive for shortness of breath. Negative for cough.         SOB improved since admission, but not as good as yesterday; on nasal canula 4L   Cardiovascular:  Positive for leg swelling. Negative for chest pain and palpitations.   Gastrointestinal:  Positive for abdominal distention and abdominal pain. Negative for nausea and vomiting.        Patient reports increased abdominal distention from yesterday   Genitourinary:  Negative for difficulty urinating, dysuria and hematuria.   Musculoskeletal:  Positive for arthralgias.        Complains of right knee pain. Patient fell before coming to the ED.    Neurological:  Negative for dizziness and headaches.     Objective:     Vital Signs (Most Recent):  Temp: 98.3 °F (36.8 °C) (07/29/24 1529)  Pulse: 67 (07/29/24 1830)  Resp: 20 (07/29/24 1529)  BP: 92/61 (07/29/24 1830)  SpO2: 96 % (07/29/24 1953) Vital Signs (24h Range):  Temp:  [98.2 °F (36.8 °C)-98.5 °F (36.9 °C)] 98.3 °F (36.8 °C)  Pulse:  [61-79] 67  Resp:  [16-20] 20  SpO2:  [93 %-100 %] 96 %  BP: ()/(58-61) 92/61     Weight: 116.6 kg (257 lb)  Body mass index is 42.77 kg/m².    Intake/Output Summary (Last 24 hours) at 7/29/2024 2031  Last data filed at 7/29/2024 1051  Gross per 24 hour   Intake --   Output 1700 ml   Net -1700 ml         Physical Exam  Constitutional:       General: She is not in acute distress.     Appearance: She is obese. She is not ill-appearing.   HENT:      Head: Normocephalic and atraumatic.   Eyes:      General: No scleral icterus.  Cardiovascular:      Rate and Rhythm: Normal rate and regular rhythm.   Pulmonary:      Effort: Pulmonary effort is normal.      Breath sounds: Normal breath sounds.      Comments: Patient on 4 L nasal canula  Abdominal:      General: Bowel sounds are normal. There is distension.       Tenderness: There is no abdominal tenderness. There is no guarding or rebound.      Comments: Abdomen felt tight but was not tender. Positive fluid wave   Musculoskeletal:      Right lower leg: Edema present.      Left lower leg: Edema present.      Comments: Edema much improved. Only trace edema remaining. Patient wearing compression stockings.    Skin:     General: Skin is warm and dry.      Coloration: Skin is not jaundiced.   Neurological:      Mental Status: She is alert and oriented to person, place, and time.   Psychiatric:         Mood and Affect: Mood normal.         Behavior: Behavior normal.             Significant Labs: All pertinent labs within the past 24 hours have been reviewed.    Significant Imaging: I have reviewed all pertinent imaging results/findings within the past 24 hours.

## 2024-07-30 NOTE — PROCEDURES
Radiology Post-Procedure Note    Pre Op Diagnosis: Ascites  Post Op Diagnosis: Same    Procedure: Ultrasound Guided Paracentesis    Procedure performed by: Shannen Callahan PA-C    Written Informed Consent Obtained: Yes  Specimen Removed: YES (yellow, clear)  Estimated Blood Loss: Minimal    Findings:   Successful paracentesis from RUQ.  Albumin administered PRN per protocol.    Patient tolerated procedure well.    Shannen Callahan PA-C  Interventional Radiology  [phone]

## 2024-07-30 NOTE — ASSESSMENT & PLAN NOTE
Seen by Dr. Saba with hepatology for previous chronic mild elevation in LFT, determined most likely NADLF. Now with cirrhotic morphology on CT. Previous EGD with portal gastropathy. LFT normal on admit. 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     - Liver US with Doppler  - Started on spironolactone 25mg   - Lasix 80 tid   -Lasix has been transitioned to 80 BID

## 2024-07-30 NOTE — PROGRESS NOTES
[facility] - Stepdown Flex ([place] [id number])  [facility] Medicine  Progress Note    Patient Name: Vicky Alvarado  MRN: [id number]  Patient Class: IP- Inpatient   Admission Date: 7/26/2024  Length of Stay: 3 days  Attending Physician: Ramon Toure DO  Primary Care Provider: Gordy Cordero MD        Subjective:     Principal Problem:Acute on chronic diastolic heart failure        HPI:  Patient is a 59F with CHF (EF 60%, G2DD), HTN, Afib (s/p watchman procedure), T2DM (prev. on tirzepatide) presents to the ED w/ minor fall and progressively worsening SOB. Notes she couldn't get up after her fall, came in in a wheelchair. Notes she has had SOB for the past month with exertion, though now it is with rest as well. Reports orthopnea during this time frame. States she does not have any pain with breathing, but does additionally endorse occasional, intermittent chest pain. Denies abdominal pain, fever, or chills. States she has early satiety. No prior admission for HF exacerbation. Reports experiencing b/l lower extremity edema previously, but denies prior abdominal swelling. Swelling precluding normal ambulation, using walker (previously for balance) to assist d.t weakness. Has not missed doses of home lasix or antihypertensive medications. No reduced urine output at home.     In ED, normotensive, no tachycardia, some tachypnea (RR 22-28), initially on NRB -> HFNC 12L -> BIPAP -> 6L. No desaturations recorded during transitions in O2 therapy (BIPAP primarily placed for pulmonary edema seen on CXR). CBC with normocytic anemia (Hb 10.6), no leukocytosis. CMP with hypokalemia K 2.9, Cr 1.8/BUN 27 (baseline appears to be 1-1.4), Alb 2.7, no LFT elevation. Mg 1.5. , Troponin normal. VBG 7.46 / 42. CT AP with cirrhotic liver morphology, questionable GB sludge/stones, large volume ascites. CXR with hypoventilatory changes, R>L perihilar infiltrates c/f CHF. EKG NSR.  Received K replacement, 100mg IV Lasix in ED.        Overview/Hospital Course:  Patient is here for SOB due to volume overload. Patient has had CHF exacerbation in the past but also has evidence of a cirrhosis requiring a paracentesis in the ED in which they removed 7 L of fluid. The ascitic fluid was sent to lab and there is no evidence of SBP at this time. Abdominal CT shows a cirrhotic morphology of the liver. US/doppler of  liver shows evidence of cirrhosis, splenotmegally, and portal hypertension. She shows no signs of jaundice. Her liver enzymes, Alk Phos, and bilirubin are wnl. Protein studies of the ascitic fluid show increased protein leaning more towards a cardiac etiology.  CXR shows heart to be boarderline in regards to being enlarged and some perihilar infiltrate. ECHO on (7/29/24) showed intermediate diastolic dysfunction with EF 60-65%, minor aortic stenosis, and moderate pulmonary hypertension with estimated pulmonary artery systolic pressure is 54 mmHg.Paracentesis performed (7/30/24) removed an additional 9.8L.      Interval History: Ms. Alvarado had more hypoxic episodes last night. She states she woke up multiple times to find that her home BIPAP had accidentally slipped off. She is on 5 L this morning and saturating well. She is scheduled to go to a paracentesis today. ECHO showed intermediate diastolic dysfunction with EF 60-65%, minor aortic stenosis, and moderate pulmonary hypertension with estimated pulmonary artery systolic pressure is 54 mmHg.Therapeutic paracentesis performed (7/30/24) removed an additional 9.8L.      Review of Systems   Constitutional:  Negative for chills and diaphoresis.   Respiratory:  Positive for shortness of breath. Negative for cough.         SOB improved since admission, but not as good as yesterday; on nasal canula 5L   Cardiovascular:  Positive for leg swelling. Negative for chest pain and palpitations.   Gastrointestinal:  Positive for abdominal distention and abdominal pain. Negative for nausea and  vomiting.        Patient reports increased abdominal distention from yesterday (prior to today's paracentesis)    Genitourinary:  Negative for difficulty urinating, dysuria and hematuria.   Musculoskeletal:  Positive for arthralgias.        Complains of right knee pain. Patient fell before coming to the ED.    Neurological:  Negative for dizziness and headaches.     Objective:     Vital Signs (Most Recent):  Temp: 98.5 °F (36.9 °C) (07/30/24 0731)  Pulse: 64 (07/30/24 0731)  Resp: 18 (07/30/24 0731)  BP: 120/67 (07/30/24 0731)  SpO2: 100 % (07/30/24 0731) Vital Signs (24h Range):  Temp:  [97.5 °F (36.4 °C)-98.5 °F (36.9 °C)] 98.5 °F (36.9 °C)  Pulse:  [60-79] 64  Resp:  [13-22] 18  SpO2:  [75 %-100 %] 100 %  BP: ()/(58-69) 120/67     Weight: 116.6 kg (257 lb)  Body mass index is 42.77 kg/m².    Intake/Output Summary (Last 24 hours) at 7/30/2024 0929  Last data filed at 7/29/2024 2050  Gross per 24 hour   Intake --   Output 1350 ml   Net -1350 ml         Physical Exam  Constitutional:       General: She is not in acute distress.     Appearance: She is obese. She is not ill-appearing.   HENT:      Head: Normocephalic and atraumatic.   Eyes:      General: No scleral icterus.  Cardiovascular:      Rate and Rhythm: Normal rate and regular rhythm.   Pulmonary:      Effort: Pulmonary effort is normal.      Breath sounds: Normal breath sounds.      Comments: Patient on 4 L nasal canula  Abdominal:      General: Bowel sounds are normal. There is distension.      Tenderness: There is no abdominal tenderness. There is no guarding or rebound.      Comments: Abdomen felt tight but was not tender. Positive fluid wave. Noticeably more distended than yesterday (prior to paracentesis).   Musculoskeletal:      Right lower leg: Edema present.      Left lower leg: Edema present.      Comments: Edema improved since admission. Slight pitting edema.  Patient wearing compression stockings.    Skin:     General: Skin is warm and dry.       Coloration: Skin is not jaundiced.   Neurological:      Mental Status: She is alert and oriented to person, place, and time.   Psychiatric:         Mood and Affect: Mood normal.         Behavior: Behavior normal.             Significant Labs: All pertinent labs within the past 24 hours have been reviewed.    Significant Imaging: I have reviewed all pertinent imaging results/findings within the past 24 hours.    Assessment/Plan:      * Acute on chronic diastolic heart failure  Cardiorenal syndrome  Acute HF exacerbation of uncertain etiology (no missed doses of lasix). Last TTE with EF 60% and G2DD. Concern that she may have new R-sided HF given anasarca (large volume ascites) versus now concomitant MASH cirrhosis compounding volume overloaded state and therefore needs higher diuresis at baseline. Suspect JHONATAN d.t overload - CRS.     - Lasix 80mg IV BID (slightly conservative in case LVP can occur later)  - IM6 consulted for large-volume paracentesis  - Started spironolactone 25mg for K retention and slightly augmented diuresis  - RFP BID  - BIPAP qhs and during naps   -Scheduled for therapeutic paracentesis  -Paracentesis (7/30/24) removed 9.8L  -ECHO revealed intermediate diastolic dysfunction with EF 60-65%, minor aortic stenosis, and moderate pulmonary hypertension with estimated pulmonary artery systolic pressure is 54 mmHg.Paracentesis performed (7/30/24) removed an additional 9.8L.        Anasarca  Concerned anasarca may represent sequelae of cirrhosis. EGD 8/2021 with Portal hypertensive gastropathy. No varices. No abd pain, fever, chills, or confusion to raise concern for SBP or HE respectively    - Liver doppler  - Aldactone and Lasix for diuresis  - Favor albumin if hypotensive  - LVP   -Initial SBP Gram stain negative for WBC making SBP less likely  -f/u on ascities fluid anaerobic and aerobic cultures      Stage 3b chronic kidney disease  Improved    Creatine stable for now. BMP reviewed- noted  Estimated Creatinine Clearance: 63.6 mL/min (based on SCr of 1.2 mg/dL). according to latest data. Based on current GFR, CKD stage is stage 3 - GFR 30-59.  Monitor UOP and serial BMP and adjust therapy as needed. Renally dose meds. Avoid nephrotoxic medications and procedures.    NAFLD (nonalcoholic fatty liver disease)  Seen by Dr. Saba with hepatology for previous chronic mild elevation in LFT, determined most likely NADLF. Now with cirrhotic morphology on CT. Previous EGD with portal gastropathy. LFT normal on admit. 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     - Liver US with Doppler  - Started on spironolactone 25mg   - Lasix 80 tid   -Lasix has been transitioned to 80 BID      Type 2 diabetes mellitus with diabetic polyneuropathy, without long-term current use of insulin  Patient's FSGs are controlled on current medication regimen.  Last A1c reviewed-   Lab Results   Component Value Date    HGBA1C 4.9 07/27/2024     Most recent glucose was 83  Current correctional scale   holding as BG stable    Hold Oral hypoglycemics while patient is in the hospital.    Atrial Fibrillation (paroxysmal)  Patient with Paroxysmal (<7 days) atrial fibrillation which is controlled currently with Beta Blocker (Metoprolol succinate 100 mg BID) Patient is currently in sinus rhythm.[id number] Score: 3. Anticoagulation not indicated due to has Watchman .    Essential hypertension  Chronic, controlled. Latest blood pressure and vitals reviewed-     Temp:  [97.5 °F (36.4 °C)-98.5 °F (36.9 °C)]   Pulse:  [60-81]   Resp:  [12-22]   BP: ()/(57-69)   SpO2:  [68 %-100 %] .   Home meds for hypertension were reviewed and noted below.   Hypertension Medications               furosemide (LASIX) 40 MG tablet Take 1 tablet (40 mg total) by mouth 2 (two) times daily. Resume as needed for edema until followup with your PCP.    lisinopriL (PRINIVIL,ZESTRIL) 40 MG tablet Take 1 tablet (40 mg total) by mouth  once daily.    metoprolol succinate (TOPROL-XL) 100 MG 24 hr tablet Take 1 tablet (100 mg total) by mouth 2 (two) times daily.    NIFEdipine (PROCARDIA-XL) 60 MG (OSM) 24 hr tablet Take 1 tablet (60 mg total) by mouth once daily.            While in the hospital, will manage blood pressure as follows; Adjust home antihypertensive regimen as follows- Continue toprol for afib, hold other antihypertensives d/t need for aggressive diuresis plus presence of JHONATAN. Did add spironolactone for K retention and now presence of ascites in setting of possible cirrhosis     Will utilize p.r.n. blood pressure medication only if patient's blood pressure greater than 220/110 and she develops symptoms such as worsening chest pain or shortness of breath.      VTE Risk Mitigation (From admission, onward)           Ordered     heparin (porcine) injection 7,500 Units  Every 8 hours         07/29/24 1202     IP VTE HIGH RISK PATIENT  Once         07/27/24 0351     Place sequential compression device  Until discontinued         07/27/24 0351                    Discharge Planning   RAYO: 7/31/2024     Code Status: Full Code   Is the patient medically ready for discharge?:     Reason for patient still in hospital (select all that apply): Patient trending condition and Treatment  Discharge Plan A: Home with family                  Marah Castro MD  Department of Hospital Medicine   [facility] - Stepdown Flex ([place]-[facility])

## 2024-07-31 LAB
ALBUMIN SERPL BCP-MCNC: 2.4 G/DL (ref 3.5–5.2)
ALBUMIN SERPL BCP-MCNC: 2.6 G/DL (ref 3.5–5.2)
ANION GAP SERPL CALC-SCNC: 11 MMOL/L (ref 8–16)
ANION GAP SERPL CALC-SCNC: 13 MMOL/L (ref 8–16)
BACTERIA #/AREA URNS AUTO: ABNORMAL /HPF
BACTERIA SPEC AEROBE CULT: NO GROWTH
BASOPHILS # BLD AUTO: 0.03 K/UL (ref 0–0.2)
BASOPHILS NFR BLD: 0.3 % (ref 0–1.9)
BILIRUB UR QL STRIP: NEGATIVE
BUN SERPL-MCNC: 17 MG/DL (ref 6–20)
BUN SERPL-MCNC: 18 MG/DL (ref 6–20)
CALCIUM SERPL-MCNC: 9 MG/DL (ref 8.7–10.5)
CALCIUM SERPL-MCNC: 9.1 MG/DL (ref 8.7–10.5)
CHLORIDE SERPL-SCNC: 93 MMOL/L (ref 95–110)
CHLORIDE SERPL-SCNC: 94 MMOL/L (ref 95–110)
CLARITY UR REFRACT.AUTO: CLEAR
CO2 SERPL-SCNC: 33 MMOL/L (ref 23–29)
CO2 SERPL-SCNC: 37 MMOL/L (ref 23–29)
COLOR UR AUTO: YELLOW
CREAT SERPL-MCNC: 0.9 MG/DL (ref 0.5–1.4)
CREAT SERPL-MCNC: 0.9 MG/DL (ref 0.5–1.4)
CREAT UR-MCNC: 35 MG/DL (ref 15–325)
DIFFERENTIAL METHOD BLD: ABNORMAL
EOSINOPHIL # BLD AUTO: 0 K/UL (ref 0–0.5)
EOSINOPHIL NFR BLD: 0.1 % (ref 0–8)
ERYTHROCYTE [DISTWIDTH] IN BLOOD BY AUTOMATED COUNT: 14.2 % (ref 11.5–14.5)
EST. GFR  (NO RACE VARIABLE): >60 ML/MIN/1.73 M^2
EST. GFR  (NO RACE VARIABLE): >60 ML/MIN/1.73 M^2
GLUCOSE SERPL-MCNC: 80 MG/DL (ref 70–110)
GLUCOSE SERPL-MCNC: 85 MG/DL (ref 70–110)
GLUCOSE UR QL STRIP: NEGATIVE
HCT VFR BLD AUTO: 30.8 % (ref 37–48.5)
HGB BLD-MCNC: 9.7 G/DL (ref 12–16)
HGB UR QL STRIP: NEGATIVE
HYALINE CASTS UR QL AUTO: 6 /LPF
IMM GRANULOCYTES # BLD AUTO: 0.07 K/UL (ref 0–0.04)
IMM GRANULOCYTES NFR BLD AUTO: 0.6 % (ref 0–0.5)
KETONES UR QL STRIP: NEGATIVE
LEUKOCYTE ESTERASE UR QL STRIP: ABNORMAL
LYMPHOCYTES # BLD AUTO: 0.5 K/UL (ref 1–4.8)
LYMPHOCYTES NFR BLD: 4.1 % (ref 18–48)
MAGNESIUM SERPL-MCNC: 1.4 MG/DL (ref 1.6–2.6)
MCH RBC QN AUTO: 29 PG (ref 27–31)
MCHC RBC AUTO-ENTMCNC: 31.5 G/DL (ref 32–36)
MCV RBC AUTO: 92 FL (ref 82–98)
MICROSCOPIC COMMENT: ABNORMAL
MONOCYTES # BLD AUTO: 1.1 K/UL (ref 0.3–1)
MONOCYTES NFR BLD: 9.7 % (ref 4–15)
NEUTROPHILS # BLD AUTO: 9.9 K/UL (ref 1.8–7.7)
NEUTROPHILS NFR BLD: 85.2 % (ref 38–73)
NITRITE UR QL STRIP: POSITIVE
NRBC BLD-RTO: 0 /100 WBC
PH UR STRIP: 5 [PH] (ref 5–8)
PHOSPHATE SERPL-MCNC: 2.6 MG/DL (ref 2.7–4.5)
PHOSPHATE SERPL-MCNC: 3.3 MG/DL (ref 2.7–4.5)
PLATELET # BLD AUTO: 135 K/UL (ref 150–450)
PMV BLD AUTO: 10 FL (ref 9.2–12.9)
POTASSIUM SERPL-SCNC: 3 MMOL/L (ref 3.5–5.1)
POTASSIUM SERPL-SCNC: 3.1 MMOL/L (ref 3.5–5.1)
PROT UR QL STRIP: NEGATIVE
PROT UR-MCNC: 7 MG/DL (ref 0–15)
PROT/CREAT UR: 0.2 MG/G{CREAT} (ref 0–0.2)
RBC # BLD AUTO: 3.35 M/UL (ref 4–5.4)
RBC #/AREA URNS AUTO: 1 /HPF (ref 0–4)
SODIUM SERPL-SCNC: 140 MMOL/L (ref 136–145)
SODIUM SERPL-SCNC: 141 MMOL/L (ref 136–145)
SP GR UR STRIP: 1.01 (ref 1–1.03)
SQUAMOUS #/AREA URNS AUTO: 1 /HPF
URN SPEC COLLECT METH UR: ABNORMAL
WBC # BLD AUTO: 11.55 K/UL (ref 3.9–12.7)
WBC #/AREA URNS AUTO: 5 /HPF (ref 0–5)

## 2024-07-31 PROCEDURE — 63600175 PHARM REV CODE 636 W HCPCS

## 2024-07-31 PROCEDURE — 20600001 HC STEP DOWN PRIVATE ROOM

## 2024-07-31 PROCEDURE — 80069 RENAL FUNCTION PANEL: CPT | Performed by: STUDENT IN AN ORGANIZED HEALTH CARE EDUCATION/TRAINING PROGRAM

## 2024-07-31 PROCEDURE — 94660 CPAP INITIATION&MGMT: CPT

## 2024-07-31 PROCEDURE — 25000003 PHARM REV CODE 250

## 2024-07-31 PROCEDURE — 81001 URINALYSIS AUTO W/SCOPE: CPT

## 2024-07-31 PROCEDURE — 27100171 HC OXYGEN HIGH FLOW UP TO 24 HOURS

## 2024-07-31 PROCEDURE — 36415 COLL VENOUS BLD VENIPUNCTURE: CPT | Performed by: STUDENT IN AN ORGANIZED HEALTH CARE EDUCATION/TRAINING PROGRAM

## 2024-07-31 PROCEDURE — 82570 ASSAY OF URINE CREATININE: CPT

## 2024-07-31 PROCEDURE — 99900035 HC TECH TIME PER 15 MIN (STAT)

## 2024-07-31 PROCEDURE — 83735 ASSAY OF MAGNESIUM: CPT

## 2024-07-31 PROCEDURE — 94761 N-INVAS EAR/PLS OXIMETRY MLT: CPT

## 2024-07-31 PROCEDURE — 85025 COMPLETE CBC W/AUTO DIFF WBC: CPT

## 2024-07-31 RX ORDER — DICLOFENAC SODIUM 10 MG/G
2 GEL TOPICAL 3 TIMES DAILY PRN
Status: DISCONTINUED | OUTPATIENT
Start: 2024-07-31 | End: 2024-08-13 | Stop reason: HOSPADM

## 2024-07-31 RX ORDER — MAGNESIUM SULFATE HEPTAHYDRATE 40 MG/ML
2 INJECTION, SOLUTION INTRAVENOUS
Status: DISPENSED | OUTPATIENT
Start: 2024-07-31 | End: 2024-07-31

## 2024-07-31 RX ORDER — DULOXETIN HYDROCHLORIDE 30 MG/1
60 CAPSULE, DELAYED RELEASE ORAL DAILY
Status: DISCONTINUED | OUTPATIENT
Start: 2024-08-01 | End: 2024-08-13 | Stop reason: HOSPADM

## 2024-07-31 RX ORDER — DICLOFENAC SODIUM 10 MG/G
2 GEL TOPICAL DAILY
Status: DISCONTINUED | OUTPATIENT
Start: 2024-07-31 | End: 2024-07-31

## 2024-07-31 RX ORDER — TALC
3 POWDER (GRAM) TOPICAL NIGHTLY
Status: DISCONTINUED | OUTPATIENT
Start: 2024-07-31 | End: 2024-08-13 | Stop reason: HOSPADM

## 2024-07-31 RX ORDER — SPIRONOLACTONE 50 MG/1
100 TABLET, FILM COATED ORAL DAILY
Status: DISCONTINUED | OUTPATIENT
Start: 2024-08-01 | End: 2024-07-31

## 2024-07-31 RX ADMIN — ACETAMINOPHEN 650 MG: 325 TABLET ORAL at 09:07

## 2024-07-31 RX ADMIN — POLYETHYLENE GLYCOL 3350 17 G: 17 POWDER, FOR SOLUTION ORAL at 09:07

## 2024-07-31 RX ADMIN — Medication 3 MG: at 09:07

## 2024-07-31 RX ADMIN — MAGNESIUM SULFATE HEPTAHYDRATE 2 G: 40 INJECTION, SOLUTION INTRAVENOUS at 10:07

## 2024-07-31 RX ADMIN — ATORVASTATIN CALCIUM 80 MG: 40 TABLET, FILM COATED ORAL at 09:07

## 2024-07-31 RX ADMIN — SODIUM PHOSPHATE, MONOBASIC, MONOHYDRATE AND SODIUM PHOSPHATE, DIBASIC, ANHYDROUS 15 MMOL: 142; 276 INJECTION, SOLUTION INTRAVENOUS at 10:07

## 2024-07-31 RX ADMIN — METOPROLOL SUCCINATE 100 MG: 100 TABLET, EXTENDED RELEASE ORAL at 09:07

## 2024-07-31 RX ADMIN — DULOXETINE HYDROCHLORIDE 120 MG: 30 CAPSULE, DELAYED RELEASE ORAL at 09:07

## 2024-07-31 RX ADMIN — POTASSIUM BICARBONATE 40 MEQ: 391 TABLET, EFFERVESCENT ORAL at 09:07

## 2024-07-31 RX ADMIN — HEPARIN SODIUM 7500 UNITS: 5000 INJECTION INTRAVENOUS; SUBCUTANEOUS at 05:07

## 2024-07-31 RX ADMIN — ASPIRIN 81 MG: 81 TABLET, COATED ORAL at 09:07

## 2024-07-31 RX ADMIN — POTASSIUM BICARBONATE 40 MEQ: 391 TABLET, EFFERVESCENT ORAL at 10:07

## 2024-07-31 RX ADMIN — FUROSEMIDE 80 MG: 10 INJECTION, SOLUTION INTRAVENOUS at 09:07

## 2024-07-31 RX ADMIN — HEPARIN SODIUM 7500 UNITS: 5000 INJECTION INTRAVENOUS; SUBCUTANEOUS at 09:07

## 2024-07-31 NOTE — SUBJECTIVE & OBJECTIVE
Interval History: Patient became confused overnight and called out for her  mother. Patient's home BIPAP mask slips off in her sleep. We discontinued her opioids. She is receiving acetaminophen 650 mg tablets and Diclofenac gel as needed for pain. Studies on the ascitic fluid removed yesterday returned but were not very revealing. Her ascites is likely multifactorial. PT/OT recommends acute skilled PT after discharge. Patient was able to get insurance today. Patient's kidney function improving.     Review of Systems   Constitutional:  Negative for chills and diaphoresis.   Respiratory:  Positive for shortness of breath. Negative for cough.         SOB improved since admission, but not as good as yesterday; on nasal canula 5L   Cardiovascular:  Positive for leg swelling. Negative for chest pain and palpitations.   Gastrointestinal:  Positive for abdominal distention and abdominal pain. Negative for nausea and vomiting.        Patient reports increased abdominal distention from yesterday (prior to today's paracentesis)    Genitourinary:  Negative for difficulty urinating, dysuria and hematuria.   Musculoskeletal:  Positive for arthralgias.        Complains of pain in her legs. Patient fell before coming to the ED. States she is hurting this morning   Neurological:  Negative for dizziness and headaches.     Objective:     Vital Signs (Most Recent):  Temp: 97.9 °F (36.6 °C) (24 1636)  Pulse: 75 (24 1636)  Resp: 18 (24 1056)  BP: (!) 101/58 (24 1636)  SpO2: (!) 94 % (24 1056) Vital Signs (24h Range):  Temp:  [97.5 °F (36.4 °C)-99 °F (37.2 °C)] 97.9 °F (36.6 °C)  Pulse:  [75-94] 75  Resp:  [16-22] 18  SpO2:  [90 %-99 %] 94 %  BP: ()/(54-69) 101/58     Weight: 116.6 kg (257 lb)  Body mass index is 42.77 kg/m².    Intake/Output Summary (Last 24 hours) at 2024 1436  Last data filed at 2024 0806  Gross per 24 hour   Intake --   Output 2500 ml   Net -2500 ml          Physical Exam  Constitutional:       General: She is not in acute distress.     Appearance: She is obese. She is not ill-appearing.   HENT:      Head: Normocephalic and atraumatic.   Eyes:      General: No scleral icterus.  Cardiovascular:      Rate and Rhythm: Normal rate and regular rhythm.   Pulmonary:      Effort: Pulmonary effort is normal.      Breath sounds: Normal breath sounds.      Comments: Patient on 4 L nasal canula  Abdominal:      General: Bowel sounds are normal. There is distension.      Tenderness: There is no abdominal tenderness. There is no guarding or rebound.      Comments: Abdomen is still distended but deflated some since last paracentesis. No tenderness    Musculoskeletal:      Right lower leg: Edema present.      Left lower leg: Edema present.      Comments: Edema improved since admission. Slight pitting edema.  Patient wearing compression stockings.    Skin:     General: Skin is warm and dry.      Coloration: Skin is not jaundiced.   Neurological:      Mental Status: She is alert and oriented to person, place, and time.      Comments: Patient was oriented x 3 this morning but became confused over night and called out for her  mother per family.    Psychiatric:         Mood and Affect: Mood normal.         Behavior: Behavior normal.             Significant Labs: All pertinent labs within the past 24 hours have been reviewed.    Significant Imaging: I have reviewed all pertinent imaging results/findings within the past 24 hours.

## 2024-07-31 NOTE — ASSESSMENT & PLAN NOTE
Patient's FSGs are controlled on current medication regimen.  Last A1c reviewed-   Lab Results   Component Value Date    HGBA1C 4.9 07/27/2024     Blood sugars are staying in the 80s-90s  Current correctional scale   holding as BG stable    Hold Oral hypoglycemics while patient is in the hospital.

## 2024-07-31 NOTE — PLAN OF CARE
Patient had no acute events during shift, UA collected, CT chest pending results, 4L high flow . Waffle mattress ordered. Bed low and call light within reach of the patient.           Problem: Adult Inpatient Plan of Care  Goal: Plan of Care Review  Outcome: Progressing  Goal: Patient-Specific Goal (Individualized)  Outcome: Progressing  Goal: Absence of Hospital-Acquired Illness or Injury  Outcome: Progressing  Goal: Optimal Comfort and Wellbeing  Outcome: Progressing  Goal: Readiness for Transition of Care  Outcome: Progressing     Problem: Bariatric Environmental Safety  Goal: Safety Maintained with Care  Outcome: Progressing     Problem: Diabetes Comorbidity  Goal: Blood Glucose Level Within Targeted Range  Outcome: Progressing     Problem: Skin Injury Risk Increased  Goal: Skin Health and Integrity  Outcome: Progressing     Problem: Wound  Goal: Optimal Coping  Outcome: Progressing  Goal: Optimal Functional Ability  Outcome: Progressing  Goal: Absence of Infection Signs and Symptoms  Outcome: Progressing  Goal: Improved Oral Intake  Outcome: Progressing  Goal: Optimal Pain Control and Function  Outcome: Progressing  Goal: Skin Health and Integrity  Outcome: Progressing  Goal: Optimal Wound Healing  Outcome: Progressing     Problem: Adult Inpatient Plan of Care  Goal: Plan of Care Review  Outcome: Progressing  Goal: Patient-Specific Goal (Individualized)  Outcome: Progressing  Goal: Absence of Hospital-Acquired Illness or Injury  Outcome: Progressing  Goal: Optimal Comfort and Wellbeing  Outcome: Progressing  Goal: Readiness for Transition of Care  Outcome: Progressing     Problem: Bariatric Environmental Safety  Goal: Safety Maintained with Care  Outcome: Progressing     Problem: Diabetes Comorbidity  Goal: Blood Glucose Level Within Targeted Range  Outcome: Progressing     Problem: Skin Injury Risk Increased  Goal: Skin Health and Integrity  Outcome: Progressing     Problem: Wound  Goal: Optimal  Coping  Outcome: Progressing  Goal: Optimal Functional Ability  Outcome: Progressing  Goal: Absence of Infection Signs and Symptoms  Outcome: Progressing  Goal: Improved Oral Intake  Outcome: Progressing  Goal: Optimal Pain Control and Function  Outcome: Progressing  Goal: Skin Health and Integrity  Outcome: Progressing  Goal: Optimal Wound Healing  Outcome: Progressing

## 2024-07-31 NOTE — PLAN OF CARE
Care plan reviewed.  Problem: Adult Inpatient Plan of Care  Goal: Plan of Care Review  Outcome: Progressing  Goal: Patient-Specific Goal (Individualized)  Outcome: Progressing  Goal: Absence of Hospital-Acquired Illness or Injury  Outcome: Progressing  Goal: Optimal Comfort and Wellbeing  Outcome: Progressing  Goal: Readiness for Transition of Care  Outcome: Progressing     Problem: Bariatric Environmental Safety  Goal: Safety Maintained with Care  Outcome: Progressing     Problem: Diabetes Comorbidity  Goal: Blood Glucose Level Within Targeted Range  Outcome: Progressing     Problem: Skin Injury Risk Increased  Goal: Skin Health and Integrity  Outcome: Progressing     Problem: Wound  Goal: Optimal Coping  Outcome: Progressing  Goal: Optimal Functional Ability  Outcome: Progressing  Goal: Absence of Infection Signs and Symptoms  Outcome: Progressing  Goal: Improved Oral Intake  Outcome: Progressing  Goal: Optimal Pain Control and Function  Outcome: Progressing  Goal: Skin Health and Integrity  Outcome: Progressing  Goal: Optimal Wound Healing  Outcome: Progressing

## 2024-07-31 NOTE — PLAN OF CARE
Discharge Plan A and Plan B have been determined by review of patient's clinical status, future medical and therapeutic needs, and coverage/benefits for post-acute care in coordination with multidisciplinary team members.    07/31/24 1105   Post-Acute Status   Post-Acute Authorization Placement   Post-Acute Placement Status Patient List Provided   Discharge Delays (!) Procedure Scheduling (IR, OR, Labs, Echo, Cath, Echo, EEG)   Discharge Plan   Discharge Plan A Skilled Nursing Facility     CM met with patient and patients daughter at the bedside   to discuss discharge planning.  Patients plan is for SNF.  CM spoke with the daughter and provided a SNF list.  HME: rolling walker     RAYO:  8/6/24      CM sent a secure email to [facility] for updates on medicaid pending status    10:07 am  [facility] response:        Discharge Recommendations:  moderate intensity     Discharge Equipment Recommendations:  rolling walker     Barriers to discharge:  Pt currently requiring increased level of assistance with mobility              TREATMENT PLANS:     Acute on chronic diastolic heart failure  Cardiorenal syndrome  Lasix 80mg IV BID (slightly conservative in case LVP can occur later)  - IM6 consulted for large-volume paracentesis  - Started spironolactone 25mg for K retention and slightly augmented diuresis  - RFP BID  - BIPAP qhs and during naps   -Scheduled for therapeutic paracentesis  -Paracentesis (7/30/24) removed 9.8L  -ECHO revealed intermediate diastolic dysfunction with EF 60-65%, minor aortic stenosis, and moderate pulmonary hypertension with estimated pulmonary artery systolic pressure is 54 mmHg.Paracentesis performed (7/30/24) removed an additional 9.8L.       Anasarca  Concerned anasarca may represent sequelae of cirrhosis. EGD 8/2021 with Portal hypertensive gastropathy. No varices. No abd pain, fever, chills, or confusion to raise concern for SBP or HE respectively    Initial SBP Gram stain negative for WBC making SBP  "less likely  -f/u on ascities fluid anaerobic and aerobic cultures    1:48 pm  CM completed the LOCET via phone. CM faxed [facility] to obtain the 142 for NH admission     Your fax has been successfully sent to [phone] at [phone].  ------------------------------------------------------------  From: [id number]  ------------------------------------------------------------  7/31/2024 2:04:32 PM Transmission Record          Sent to [phone] with remote ID "[email or web address] "          Result: (0/339;0/0) Success          Page record: 1 - 5          Elapsed time: 02:12 on channel 41     Debra Ga RN  Case Management  [facility]  [phone]   "

## 2024-07-31 NOTE — PROGRESS NOTES
[facility] - Stepdown Flex ([place] [id number])  [facility] Medicine  Progress Note    Patient Name: Vicky lAvarado  MRN: [id number]  Patient Class: IP- Inpatient   Admission Date: 7/26/2024  Length of Stay: 4 days  Attending Physician: Ramon Toure DO  Primary Care Provider: Gordy Cordero MD        Subjective:     Principal Problem:Acute on chronic diastolic heart failure        HPI:  Patient is a 59F with CHF (EF 60%, G2DD), HTN, Afib (s/p watchman procedure), T2DM (prev. on tirzepatide) presents to the ED w/ minor fall and progressively worsening SOB. Notes she couldn't get up after her fall, came in in a wheelchair. Notes she has had SOB for the past month with exertion, though now it is with rest as well. Reports orthopnea during this time frame. States she does not have any pain with breathing, but does additionally endorse occasional, intermittent chest pain. Denies abdominal pain, fever, or chills. States she has early satiety. No prior admission for HF exacerbation. Reports experiencing b/l lower extremity edema previously, but denies prior abdominal swelling. Swelling precluding normal ambulation, using walker (previously for balance) to assist d.t weakness. Has not missed doses of home lasix or antihypertensive medications. No reduced urine output at home.     In ED, normotensive, no tachycardia, some tachypnea (RR 22-28), initially on NRB -> HFNC 12L -> BIPAP -> 6L. No desaturations recorded during transitions in O2 therapy (BIPAP primarily placed for pulmonary edema seen on CXR). CBC with normocytic anemia (Hb 10.6), no leukocytosis. CMP with hypokalemia K 2.9, Cr 1.8/BUN 27 (baseline appears to be 1-1.4), Alb 2.7, no LFT elevation. Mg 1.5. , Troponin normal. VBG 7.46 / 42. CT AP with cirrhotic liver morphology, questionable GB sludge/stones, large volume ascites. CXR with hypoventilatory changes, R>L perihilar infiltrates c/f CHF. EKG NSR.  Received K replacement, 100mg IV Lasix in ED.        Overview/Hospital Course:  Patient is here for SOB due to volume overload. Patient has had CHF exacerbation in the past but also has evidence of a cirrhosis requiring a paracentesis in the ED in which they removed 7 L of fluid. The ascitic fluid was sent to lab and there is no evidence of SBP at this time. Abdominal CT shows a cirrhotic morphology of the liver. US/doppler of  liver shows evidence of cirrhosis, splenotmegally, and portal hypertension. She shows no signs of jaundice. Her liver enzymes, Alk Phos, and bilirubin are wnl. Protein studies of the ascitic fluid show increased protein leaning more towards a cardiac etiology.  CXR shows heart to be boarderline in regards to being enlarged and some perihilar infiltrate. ECHO on (24) showed intermediate diastolic dysfunction with EF 60-65%, minor aortic stenosis, and moderate pulmonary hypertension with estimated pulmonary artery systolic pressure is 54 mmHg.Paracentesis performed (24) removed an additional 9.8L.  She has had some problems with confusion overnight. Believe that home BIPAP slipping off and patient not getting adequate saturation may be playing a role. Oriented by morning. PT/OT recommends acute skilled PT after discharge.     Interval History: Patient became confused overnight and called out for her  mother. Patient's home BIPAP mask slips off in her sleep. We discontinued her opioids. She is receiving acetaminophen 650 mg tablets and Diclofenac gel as needed for pain. Studies on the ascitic fluid removed yesterday returned but were not very revealing. Her ascites is likely multifactorial. PT/OT recommends acute skilled PT after discharge. Patient was able to get insurance today. Patient's kidney function improving.     Review of Systems   Constitutional:  Negative for chills and diaphoresis.   Respiratory:  Positive for shortness of breath. Negative for cough.         SOB improved since admission, but not as good as  yesterday; on nasal canula 5L   Cardiovascular:  Positive for leg swelling. Negative for chest pain and palpitations.   Gastrointestinal:  Positive for abdominal distention and abdominal pain. Negative for nausea and vomiting.        Patient reports increased abdominal distention from yesterday (prior to today's paracentesis)    Genitourinary:  Negative for difficulty urinating, dysuria and hematuria.   Musculoskeletal:  Positive for arthralgias.        Complains of pain in her legs. Patient fell before coming to the ED. States she is hurting this morning   Neurological:  Negative for dizziness and headaches.     Objective:     Vital Signs (Most Recent):  Temp: 97.9 °F (36.6 °C) (07/31/24 1636)  Pulse: 75 (07/31/24 1636)  Resp: 18 (07/31/24 1056)  BP: (!) 101/58 (07/31/24 1636)  SpO2: (!) 94 % (07/31/24 1056) Vital Signs (24h Range):  Temp:  [97.5 °F (36.4 °C)-99 °F (37.2 °C)] 97.9 °F (36.6 °C)  Pulse:  [75-94] 75  Resp:  [16-22] 18  SpO2:  [90 %-99 %] 94 %  BP: ()/(54-69) 101/58     Weight: 116.6 kg (257 lb)  Body mass index is 42.77 kg/m².    Intake/Output Summary (Last 24 hours) at 7/31/2024 1759  Last data filed at 7/31/2024 1753  Gross per 24 hour   Intake --   Output 2500 ml   Net -2500 ml         Physical Exam  Constitutional:       General: She is not in acute distress.     Appearance: She is obese. She is not ill-appearing.   HENT:      Head: Normocephalic and atraumatic.   Eyes:      General: No scleral icterus.  Cardiovascular:      Rate and Rhythm: Normal rate and regular rhythm.   Pulmonary:      Effort: Pulmonary effort is normal.      Breath sounds: Normal breath sounds.      Comments: Patient on 4 L nasal canula  Abdominal:      General: Bowel sounds are normal. There is distension.      Tenderness: There is no abdominal tenderness. There is no guarding or rebound.      Comments: Abdomen is still distended but deflated some since last paracentesis. No tenderness    Musculoskeletal:      Right  lower leg: Edema present.      Left lower leg: Edema present.      Comments: Edema improved since admission. Slight pitting edema.  Patient wearing compression stockings.    Skin:     General: Skin is warm and dry.      Coloration: Skin is not jaundiced.   Neurological:      Mental Status: She is alert and oriented to person, place, and time.      Comments: Patient was oriented x 3 this morning but became confused over night and called out for her  mother per family.    Psychiatric:         Mood and Affect: Mood normal.         Behavior: Behavior normal.             Significant Labs: All pertinent labs within the past 24 hours have been reviewed.    Significant Imaging: I have reviewed all pertinent imaging results/findings within the past 24 hours.    Assessment/Plan:      * Acute on chronic diastolic heart failure  Cardiorenal syndrome  Acute HF exacerbation of uncertain etiology (no missed doses of lasix). Last TTE with EF 60% and G2DD. Concern that she may have new R-sided HF given anasarca (large volume ascites) versus now concomitant MASH cirrhosis compounding volume overloaded state and therefore needs higher diuresis at baseline. Suspect JHONATAN d.t overload - CRS.     - Lasix 80mg IV BID (slightly conservative in case LVP can occur later)  - IM6 consulted for large-volume paracentesis  - Started spironolactone 25mg for K retention and slightly augmented diuresis  - RFP BID  - BIPAP qhs and during naps   -Scheduled for therapeutic paracentesis  -Paracentesis (24) removed 9.8L  -ECHO revealed intermediate diastolic dysfunction with EF 60-65%, minor aortic stenosis, and moderate pulmonary hypertension with estimated pulmonary artery systolic pressure is 54 mmHg.Paracentesis performed (24) removed an additional 9.8L.        Anasarca  Concerned anasarca may represent sequelae of cirrhosis. EGD 2021 with Portal hypertensive gastropathy. No varices. No abd pain, fever, chills, or confusion to raise  concern for SBP or HE respectively    - Liver doppler  - Aldactone and Lasix for diuresis  - Favor albumin if hypotensive  - LVP   -Initial SBP Gram stain negative for WBC making SBP less likely  -f/u on ascities fluid anaerobic and aerobic cultures      Stage 3b chronic kidney disease  Improved    Creatine stable for now. BMP reviewed- noted Estimated Creatinine Clearance: 63.6 mL/min (based on SCr of 1.2 mg/dL). according to latest data. Based on current GFR, CKD stage is stage 3 - GFR 30-59.  Monitor UOP and serial BMP and adjust therapy as needed. Renally dose meds. Avoid nephrotoxic medications and procedures.    NAFLD (nonalcoholic fatty liver disease)  Seen by Dr. Saba with hepatology for previous chronic mild elevation in LFT, determined most likely NADLF. Now with cirrhotic morphology on CT. Previous EGD with portal gastropathy. LFT normal on admit. 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     - Liver US with Doppler  - Started on spironolactone 25mg   - Lasix 80 tid   -Lasix has been transitioned to 80 BID      Type 2 diabetes mellitus with diabetic polyneuropathy, without long-term current use of insulin  Patient's FSGs are controlled on current medication regimen.  Last A1c reviewed-   Lab Results   Component Value Date    HGBA1C 4.9 07/27/2024     Blood sugars are staying in the 80s-90s  Current correctional scale   holding as BG stable    Hold Oral hypoglycemics while patient is in the hospital.    Atrial Fibrillation (paroxysmal)  Patient with Paroxysmal (<7 days) atrial fibrillation which is controlled currently with Beta Blocker (Metoprolol succinate 100 mg BID) Patient is currently in sinus rhythm.[id number] Score: 3. Anticoagulation not indicated due to has Watchman .    Essential hypertension  Chronic, controlled. Latest blood pressure and vitals reviewed-     Temp:  [97.5 °F (36.4 °C)-98.5 °F (36.9 °C)]   Pulse:  [60-81]   Resp:  [12-22]   BP: ()/(57-69)    SpO2:  [68 %-100 %] .   Home meds for hypertension were reviewed and noted below.   Hypertension Medications               furosemide (LASIX) 40 MG tablet Take 1 tablet (40 mg total) by mouth 2 (two) times daily. Resume as needed for edema until followup with your PCP.    lisinopriL (PRINIVIL,ZESTRIL) 40 MG tablet Take 1 tablet (40 mg total) by mouth once daily.    metoprolol succinate (TOPROL-XL) 100 MG 24 hr tablet Take 1 tablet (100 mg total) by mouth 2 (two) times daily.    NIFEdipine (PROCARDIA-XL) 60 MG (OSM) 24 hr tablet Take 1 tablet (60 mg total) by mouth once daily.            While in the hospital, will manage blood pressure as follows; Adjust home antihypertensive regimen as follows- Continue toprol for afib, hold other antihypertensives d/t need for aggressive diuresis plus presence of JHONATAN. Did add spironolactone for K retention and now presence of ascites in setting of possible cirrhosis     Will utilize p.r.n. blood pressure medication only if patient's blood pressure greater than 220/110 and she develops symptoms such as worsening chest pain or shortness of breath.      VTE Risk Mitigation (From admission, onward)           Ordered     heparin (porcine) injection 7,500 Units  Every 8 hours         07/29/24 1202     IP VTE HIGH RISK PATIENT  Once         07/27/24 0351     Place sequential compression device  Until discontinued         07/27/24 0351                    Discharge Planning   RAYO: 8/6/2024     Code Status: Full Code   Is the patient medically ready for discharge?:     Reason for patient still in hospital (select all that apply): Patient trending condition and Treatment  Discharge Plan A: Skilled Nursing Facility   Discharge Delays: (!) Procedure Scheduling (IR, OR, Labs, Echo, Cath, Echo, EEG)              Marah Castro MD  Department of Hospital Medicine   [facility] - Stepdown Flex ([place])

## 2024-08-01 LAB
ALBUMIN SERPL BCP-MCNC: 2.2 G/DL (ref 3.5–5.2)
ALBUMIN SERPL BCP-MCNC: 2.3 G/DL (ref 3.5–5.2)
ANION GAP SERPL CALC-SCNC: 10 MMOL/L (ref 8–16)
ANION GAP SERPL CALC-SCNC: 10 MMOL/L (ref 8–16)
BASOPHILS # BLD AUTO: 0.02 K/UL (ref 0–0.2)
BASOPHILS NFR BLD: 0.2 % (ref 0–1.9)
BUN SERPL-MCNC: 17 MG/DL (ref 6–20)
BUN SERPL-MCNC: 17 MG/DL (ref 6–20)
CALCIUM SERPL-MCNC: 9 MG/DL (ref 8.7–10.5)
CALCIUM SERPL-MCNC: 9.1 MG/DL (ref 8.7–10.5)
CHLORIDE SERPL-SCNC: 90 MMOL/L (ref 95–110)
CHLORIDE SERPL-SCNC: 93 MMOL/L (ref 95–110)
CO2 SERPL-SCNC: 37 MMOL/L (ref 23–29)
CO2 SERPL-SCNC: 39 MMOL/L (ref 23–29)
CREAT SERPL-MCNC: 0.9 MG/DL (ref 0.5–1.4)
CREAT SERPL-MCNC: 0.9 MG/DL (ref 0.5–1.4)
DIFFERENTIAL METHOD BLD: ABNORMAL
EOSINOPHIL # BLD AUTO: 0 K/UL (ref 0–0.5)
EOSINOPHIL NFR BLD: 0.3 % (ref 0–8)
ERYTHROCYTE [DISTWIDTH] IN BLOOD BY AUTOMATED COUNT: 14 % (ref 11.5–14.5)
EST. GFR  (NO RACE VARIABLE): >60 ML/MIN/1.73 M^2
EST. GFR  (NO RACE VARIABLE): >60 ML/MIN/1.73 M^2
GLUCOSE SERPL-MCNC: 84 MG/DL (ref 70–110)
GLUCOSE SERPL-MCNC: 97 MG/DL (ref 70–110)
HCT VFR BLD AUTO: 30.9 % (ref 37–48.5)
HGB BLD-MCNC: 9.5 G/DL (ref 12–16)
IMM GRANULOCYTES # BLD AUTO: 0.06 K/UL (ref 0–0.04)
IMM GRANULOCYTES NFR BLD AUTO: 0.5 % (ref 0–0.5)
LYMPHOCYTES # BLD AUTO: 0.7 K/UL (ref 1–4.8)
LYMPHOCYTES NFR BLD: 6.5 % (ref 18–48)
MAGNESIUM SERPL-MCNC: 1.5 MG/DL (ref 1.6–2.6)
MCH RBC QN AUTO: 27.9 PG (ref 27–31)
MCHC RBC AUTO-ENTMCNC: 30.7 G/DL (ref 32–36)
MCV RBC AUTO: 91 FL (ref 82–98)
MONOCYTES # BLD AUTO: 0.8 K/UL (ref 0.3–1)
MONOCYTES NFR BLD: 6.8 % (ref 4–15)
NEUTROPHILS # BLD AUTO: 9.8 K/UL (ref 1.8–7.7)
NEUTROPHILS NFR BLD: 85.7 % (ref 38–73)
NRBC BLD-RTO: 0 /100 WBC
PHOSPHATE SERPL-MCNC: 2.9 MG/DL (ref 2.7–4.5)
PHOSPHATE SERPL-MCNC: 2.9 MG/DL (ref 2.7–4.5)
PLATELET # BLD AUTO: 151 K/UL (ref 150–450)
PMV BLD AUTO: 9.8 FL (ref 9.2–12.9)
POTASSIUM SERPL-SCNC: 2.8 MMOL/L (ref 3.5–5.1)
POTASSIUM SERPL-SCNC: 3.3 MMOL/L (ref 3.5–5.1)
RBC # BLD AUTO: 3.4 M/UL (ref 4–5.4)
SODIUM SERPL-SCNC: 139 MMOL/L (ref 136–145)
SODIUM SERPL-SCNC: 140 MMOL/L (ref 136–145)
WBC # BLD AUTO: 11.46 K/UL (ref 3.9–12.7)

## 2024-08-01 PROCEDURE — 94660 CPAP INITIATION&MGMT: CPT

## 2024-08-01 PROCEDURE — 99223 1ST HOSP IP/OBS HIGH 75: CPT | Mod: ,,, | Performed by: INTERNAL MEDICINE

## 2024-08-01 PROCEDURE — 25000003 PHARM REV CODE 250

## 2024-08-01 PROCEDURE — 97110 THERAPEUTIC EXERCISES: CPT | Mod: CQ

## 2024-08-01 PROCEDURE — 36415 COLL VENOUS BLD VENIPUNCTURE: CPT | Performed by: STUDENT IN AN ORGANIZED HEALTH CARE EDUCATION/TRAINING PROGRAM

## 2024-08-01 PROCEDURE — 27000221 HC OXYGEN, UP TO 24 HOURS

## 2024-08-01 PROCEDURE — 83735 ASSAY OF MAGNESIUM: CPT

## 2024-08-01 PROCEDURE — 94761 N-INVAS EAR/PLS OXIMETRY MLT: CPT

## 2024-08-01 PROCEDURE — 97530 THERAPEUTIC ACTIVITIES: CPT | Mod: CQ

## 2024-08-01 PROCEDURE — 97112 NEUROMUSCULAR REEDUCATION: CPT

## 2024-08-01 PROCEDURE — 63600175 PHARM REV CODE 636 W HCPCS

## 2024-08-01 PROCEDURE — 20600001 HC STEP DOWN PRIVATE ROOM

## 2024-08-01 PROCEDURE — 99900035 HC TECH TIME PER 15 MIN (STAT)

## 2024-08-01 PROCEDURE — 85025 COMPLETE CBC W/AUTO DIFF WBC: CPT

## 2024-08-01 PROCEDURE — 36415 COLL VENOUS BLD VENIPUNCTURE: CPT

## 2024-08-01 PROCEDURE — 80069 RENAL FUNCTION PANEL: CPT | Performed by: STUDENT IN AN ORGANIZED HEALTH CARE EDUCATION/TRAINING PROGRAM

## 2024-08-01 PROCEDURE — 97530 THERAPEUTIC ACTIVITIES: CPT

## 2024-08-01 RX ORDER — MAGNESIUM SULFATE HEPTAHYDRATE 40 MG/ML
2 INJECTION, SOLUTION INTRAVENOUS ONCE
Status: COMPLETED | OUTPATIENT
Start: 2024-08-01 | End: 2024-08-01

## 2024-08-01 RX ORDER — FUROSEMIDE 10 MG/ML
80 INJECTION INTRAMUSCULAR; INTRAVENOUS EVERY 12 HOURS
Status: DISCONTINUED | OUTPATIENT
Start: 2024-08-01 | End: 2024-08-04

## 2024-08-01 RX ORDER — POTASSIUM CHLORIDE 20 MEQ/1
40 TABLET, EXTENDED RELEASE ORAL
Status: COMPLETED | OUTPATIENT
Start: 2024-08-01 | End: 2024-08-01

## 2024-08-01 RX ORDER — POTASSIUM CHLORIDE 7.45 MG/ML
10 INJECTION INTRAVENOUS ONCE
Status: COMPLETED | OUTPATIENT
Start: 2024-08-01 | End: 2024-08-01

## 2024-08-01 RX ADMIN — METOPROLOL SUCCINATE 100 MG: 100 TABLET, EXTENDED RELEASE ORAL at 09:08

## 2024-08-01 RX ADMIN — ATORVASTATIN CALCIUM 80 MG: 40 TABLET, FILM COATED ORAL at 09:08

## 2024-08-01 RX ADMIN — POTASSIUM CHLORIDE 40 MEQ: 1500 TABLET, EXTENDED RELEASE ORAL at 09:08

## 2024-08-01 RX ADMIN — ACETAMINOPHEN 650 MG: 325 TABLET ORAL at 11:08

## 2024-08-01 RX ADMIN — ASPIRIN 81 MG: 81 TABLET, COATED ORAL at 09:08

## 2024-08-01 RX ADMIN — ALPRAZOLAM 0.25 MG: 0.25 TABLET ORAL at 09:08

## 2024-08-01 RX ADMIN — ACETAMINOPHEN 650 MG: 325 TABLET ORAL at 01:08

## 2024-08-01 RX ADMIN — DULOXETINE HYDROCHLORIDE 60 MG: 30 CAPSULE, DELAYED RELEASE ORAL at 09:08

## 2024-08-01 RX ADMIN — ACETAMINOPHEN 650 MG: 325 TABLET ORAL at 09:08

## 2024-08-01 RX ADMIN — HEPARIN SODIUM 7500 UNITS: 5000 INJECTION INTRAVENOUS; SUBCUTANEOUS at 01:08

## 2024-08-01 RX ADMIN — FUROSEMIDE 80 MG: 10 INJECTION, SOLUTION INTRAVENOUS at 09:08

## 2024-08-01 RX ADMIN — POLYETHYLENE GLYCOL 3350 17 G: 17 POWDER, FOR SOLUTION ORAL at 09:08

## 2024-08-01 RX ADMIN — POTASSIUM CHLORIDE 40 MEQ: 1500 TABLET, EXTENDED RELEASE ORAL at 06:08

## 2024-08-01 RX ADMIN — Medication 3 MG: at 09:08

## 2024-08-01 RX ADMIN — MAGNESIUM SULFATE HEPTAHYDRATE 2 G: 40 INJECTION, SOLUTION INTRAVENOUS at 11:08

## 2024-08-01 RX ADMIN — HEPARIN SODIUM 7500 UNITS: 5000 INJECTION INTRAVENOUS; SUBCUTANEOUS at 05:08

## 2024-08-01 RX ADMIN — SPIRONOLACTONE 50 MG: 25 TABLET ORAL at 09:08

## 2024-08-01 RX ADMIN — POTASSIUM CHLORIDE 40 MEQ: 1500 TABLET, EXTENDED RELEASE ORAL at 04:08

## 2024-08-01 RX ADMIN — POTASSIUM CHLORIDE 10 MEQ: 7.46 INJECTION, SOLUTION INTRAVENOUS at 11:08

## 2024-08-01 RX ADMIN — HEPARIN SODIUM 7500 UNITS: 5000 INJECTION INTRAVENOUS; SUBCUTANEOUS at 09:08

## 2024-08-01 NOTE — CONSULTS
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 She also describes ongoing weakness.     Per chart review, she has had steady increase in weight and abdominal girth for several months with an accelerated progression of symptoms over the last week. She presented with increased oxygen requirements and tachypnea and was initially placed on NRB. Labs were significant for hypokalemi 2.9 and elevated Cr to 1.8 with BNP of 410.     CTAP to evaluate abdominal distention showed cirrhotic morphology of the liver and large volume ascites. Liver U/S on presentation showed cirrhotic liver morphology with portal HTN, splenomegaly and moderate volume ascites. Paracentesis was completed with removal of 7L of fluid. Initial SAAG was 1.0 with total ascitic protein of 3.2. Repeat paracentesis with removal of 10L on 7/30 had SAAG of 1.5 with total ascitic protein of 2.9. Cytology pending.     Of note Echo cardiogram performed showed indeterminate diastolic function, RV systolic function boderline low, PASP of 54 but normal CVP (3 mm Hg) and EF of 60 - 65%. As an outpatient, she was planned to have transjugular biopsy with pressure measurments to determine if she has cirrhosis histologically but appears to have been  lost to follow up since 4/18     Hepatology was consulted for consideration of liver biopsy in the setting of volume overload to determine if hepatic etiology.    Review of Systems   Constitutional:  Negative for chills and fever.   HENT:  Negative for congestion and sore throat.    Eyes:  Negative for photophobia and discharge.   Respiratory:  Negative for cough and shortness of breath.    Cardiovascular:  Positive for leg swelling. Negative for chest pain and palpitations.   Gastrointestinal:  Positive for abdominal distention. Negative for constipation, diarrhea, nausea and vomiting.   Musculoskeletal:  Negative for myalgias.   Skin:  Negative for rash.   Neurological:  Negative for seizures.   Psychiatric/Behavioral:  Negative for behavioral problems.        Past Medical History:   Diagnosis Date    Anticoagulant long-term use     Arthritis     Atrial fibrillation     cardioversion    Atrial fibrillation with RVR     HAS WATCHMAN IN PLACE NOW    Avascular necrosis     L hand    CHF (congestive heart failure)     Chronic fatigue     Depression     Diabetes mellitus     Encounter for blood transfusion 07/22/2020    Encounter for blood transfusion 03/2022    Fatty liver     GERD (gastroesophageal reflux disease)     Hx of psychiatric care     Hypertension     Iron deficiency anemia 05/07/2022    TIBC 444 with saturated iron 8    Liver disease     Obese     Pre-diabetes 05/07/2022    Psychiatric problem     Sleep apnea     wears cpap    SOB (shortness of breath) on exertion     Weight loss     75lb intentional weight loss       Past Surgical History:   Procedure Laterality Date    ABLATION OF ARRHYTHMOGENIC FOCUS FOR ATRIAL FIBRILLATION N/A 07/20/2020    Procedure: Ablation atrial fibrillation;  Surgeon: Gabriel Hawley MD;  Location: [facility] EP LAB;  Service: Cardiology;  Laterality: N/A;  afib, PVI, RFA, REENA, SHADY, anes, MB, 3 Prep    ABLATION OF ARRHYTHMOGENIC FOCUS FOR ATRIAL FIBRILLATION N/A 01/24/2022     Procedure: Ablation atrial fibrillation;  Surgeon: Gabriel Hawley MD;  Location: [facility] EP LAB;  Service: Cardiology;  Laterality: N/A;  afib/afl, PVI (re-do)/CTI, RFA, REENA (cx if SR), SHADY, anes, MB, 3 Prep    APPLICATION OF WOUND VACUUM-ASSISTED CLOSURE DEVICE Left 08/03/2020    Procedure: APPLICATION, WOUND VAC;  Surgeon: SEMAJ Márquez III, MD;  Location: [facility] OR 2ND FLR;  Service: Peripheral Vascular;  Laterality: Left;    APPLICATION OF WOUND VACUUM-ASSISTED CLOSURE DEVICE Left 08/06/2020    Procedure: APPLICATION, WOUND VAC;  Surgeon: SEMAJ Márquez III, MD;  Location: [facility] OR 2ND FLR;  Service: Peripheral Vascular;  Laterality: Left;    CARPAL TUNNEL RELEASE Right 06/10/2020    Procedure: RELEASE, CARPAL TUNNEL - RIGHT;  Surgeon: Adelaida Hall MD;  Location: [facility];  Service: Orthopedics;  Laterality: Right;  GENERAL AND REGIONAL    CARPAL TUNNEL RELEASE Left 05/05/2021    Procedure: RELEASE, CARPAL TUNNEL, LEFT;  Surgeon: Adelaida Hall MD;  Location: [facility];  Service: Orthopedics;  Laterality: Left;  GENERAL & REGIONAL IN PACU    CARPECTOMY Left 9/6/2023    Procedure: CARPECTOMY;  Surgeon: Adelaida Hall MD;  Location: [facility];  Service: Orthopedics;  Laterality: Left;  MAC/REGIONAL  LEFT PROXIMAL ROW    CLOSURE OF WOUND Left 08/06/2020    Procedure: CLOSURE, WOUND;  Surgeon: SEMAJ Márquez III, MD;  Location: [facility] OR 2ND FLR;  Service: Peripheral Vascular;  Laterality: Left;  Complex    COLONOSCOPY N/A 08/17/2021    Procedure: COLONOSCOPY Suprep;  Surgeon: Loco Deluca MD;  Location: [facility] ENDO;  Service: Endoscopy;  Laterality: N/A;    ECHOCARDIOGRAM,TRANSESOPHAGEAL N/A 07/24/2023    Procedure: Transesophageal echo (REENA) intra-procedure log documentation;  Surgeon: [facility] Provider;  Location: [facility] EP LAB;  Service: Cardiology;  Laterality: N/A;  s/p WM, REENA, anes, 3 Prep    ESOPHAGOGASTRODUODENOSCOPY N/A 08/17/2021    Procedure: EGD  (ESOPHAGOGASTRODUODENOSCOPY);  Surgeon: Loco Deluca MD;  Location: [facility] ENDO;  Service: Endoscopy;  Laterality: N/A;  Patient is schedule to have her Covid test on 08/14/2021 at the [facility] @ 9:30am. [place].    EXPLORATION OF FEMORAL ARTERY Left 07/21/2020    Procedure: EXPLORATION, ARTERY, FEMORAL;  Surgeon: SEMAJ Márquez III, MD;  Location: [address];  Service: Peripheral Vascular;  Laterality: Left;  1. Urgent Direct repair, L SFA branch laceration    FOOT SURGERY      HYSTERECTOMY      HYSTEROSCOPY WITH DILATION AND CURETTAGE OF UTERUS N/A 02/19/2022    Procedure: HYSTEROSCOPY, WITH DILATION AND CURETTAGE OF UTERUS;  Surgeon: Shane Palomo MD;  Location: [address];  Service: OB/GYN;  Laterality: N/A;    INCISION AND DRAINAGE OF KNEE Left 05/12/2022    Procedure: INCISION AND DRAINAGE, Prepatellar bursectomy.;  Surgeon: Dre Guzmán MD;  Location: [address];  Service: Orthopedics;  Laterality: Left;    LEFT HEART CATHETERIZATION Left 02/07/2023    Procedure: Left heart cath;  Surgeon: Josiah Terry MD;  Location: [facility] CATH LAB;  Service: Cardiology;  Laterality: Left;  borderline moderate bleeding risk 6.3%    OCCLUSION OF LEFT ATRIAL APPENDAGE N/A 06/12/2023    Procedure: Left atrial appendage occlusion;  Surgeon: Gabriel Hawley MD;  Location: [facility] EP LAB;  Service: Cardiology;  Laterality: N/A;  afib, watchman, BSCI, demi, anes, MB, 3prep    RELEASE OF ULNAR NERVE AT CUBITAL TUNNEL Bilateral     ROBOT-ASSISTED LAPAROSCOPIC ABDOMINAL HYSTERECTOMY USING DA KEIRY XI N/A 08/09/2022    Procedure: XI ROBOTIC HYSTERECTOMY;  Surgeon: Yolanda Barriga MD;  Location: [facility];  Service: OB/GYN;  Laterality: N/A;  dual console requested    ROBOT-ASSISTED LAPAROSCOPIC SALPINGO-OOPHORECTOMY Bilateral 08/09/2022    Procedure: ROBOTIC SALPINGO-OOPHORECTOMY;  Surgeon: Yolanda Barriga MD;  Location: [facility];  Service: OB/GYN;  Laterality: Bilateral;    TRANSESOPHAGEAL  ECHOCARDIOGRAPHY N/A 2023    Procedure: ECHOCARDIOGRAM, TRANSESOPHAGEAL;  Surgeon: Cyril Mast MD;  Location: [facility] EP LAB;  Service: Cardiology;  Laterality: N/A;    TREATMENT OF CARDIAC ARRHYTHMIA N/A 2020    Procedure: CARDIOVERSION;  Surgeon: Gabriel Hawley MD;  Location: [facility] EP LAB;  Service: Cardiology;  Laterality: N/A;  af, demi, dccv, anes, mb, 345    TREATMENT OF CARDIAC ARRHYTHMIA  2022    Procedure: Cardioversion or Defibrillation;  Surgeon: Gabriel Hawley MD;  Location: [facility] EP LAB;  Service: Cardiology;;    WOUND DEBRIDEMENT Left 2020    Procedure: DEBRIDEMENT, WOUND;  Surgeon: SEMAJ Márquez III, MD;  Location: [facility] OR [address] [place] [address];  Service: Peripheral Vascular;  Laterality: Left;       Review of patient's allergies indicates:   Allergen Reactions    Flecainide Shortness Of Breath and Swelling    Shellfish containing products Other (See Comments)     Makes gout terrible    Vancomycin analogues Hives, Itching and Rash         Tobacco Use    Smoking status: Former     Current packs/day: 0.00     Types: Cigarettes     Quit date: 2019     Years since quittin.0    Smokeless tobacco: Never   Substance and Sexual Activity    Alcohol use: No    Drug use: No    Sexual activity: Not Currently     Partners: Male     Birth control/protection: See Surgical Hx       Medications Prior to Admission   Medication Sig Dispense Refill Last Dose    acetaminophen (TYLENOL) 500 MG tablet Take 2 tablets (1,000 mg total) by mouth 2 (two) times daily as needed for Pain. Begin post op day 3. (Patient taking differently: Take 1,000 mg by mouth every evening. Begin post op day 3.) 50 tablet 0     albuterol (VENTOLIN HFA) 90 mcg/actuation inhaler Inhale 2 puffs into the lungs every 6 (six) hours as needed for Wheezing. Rescue (Patient taking differently: Inhale 2 puffs into the lungs daily as needed for Wheezing or Shortness of Breath. Rescue) 18 g 6     ALPRAZolam (XANAX) 0.5 MG  tablet Take 1 tablet (0.5 mg total) by mouth 2 (two) times daily as needed for Anxiety. (Patient taking differently: Take 0.5 mg by mouth daily as needed for Anxiety.) 60 tablet 4     aspirin (ECOTRIN) 81 MG EC tablet Take 81 mg by mouth once daily.       atorvastatin (LIPITOR) 80 MG tablet Take 1 tablet (80 mg total) by mouth once daily. (Patient taking differently: Take 80 mg by mouth every evening.) 90 tablet 3     b complex vitamins capsule Take 1 capsule by mouth every other day.       colchicine (COLCRYS) 0.6 mg tablet For gout attack: Take TWO tablets by mouth, then, 2 hours later, take ONE additional tablet. Then take 1 tablet twice daily only if still needed for gout pain. (Patient taking differently: Take 0.6 mg by mouth daily as needed (For gout attack: Take TWO tablets by mouth, then, 2 hours later, take ONE additional tablet. Then take 1 tablet twice daily only if still needed for gout pain.).) 20 tablet 4     DULoxetine (CYMBALTA) 60 MG capsule Take 2 capsules (120 mg total) by mouth once daily. 180 capsule 3     lisinopriL (PRINIVIL,ZESTRIL) 40 MG tablet Take 1 tablet (40 mg total) by mouth once daily. 90 tablet 3     metoprolol succinate (TOPROL-XL) 100 MG 24 hr tablet Take 1 tablet (100 mg total) by mouth 2 (two) times daily. 180 tablet 3     multivitamin (THERAGRAN) per tablet Take 1 tablet by mouth once daily.       NIFEdipine (PROCARDIA-XL) 60 MG (OSM) 24 hr tablet Take 1 tablet (60 mg total) by mouth once daily. 30 tablet 6     omeprazole (PRILOSEC) 20 MG capsule TAKE 1 CAPSULE BY MOUTH ONCE DAILY (Patient taking differently: Take 20 mg by mouth daily as needed (hearturn/ indegestion).) 90 capsule 3        Objective:     Vital Signs (Most Recent):  Temp: 98.4 °F (36.9 °C) (08/01/24 0755)  Pulse: 91 (08/01/24 0755)  Resp: (!) 24 (08/01/24 0755)  BP: 112/69 (08/01/24 0755)  SpO2: (!) 90 % (08/01/24 0755) Vital Signs (24h Range):  Temp:  [97.8 °F (36.6 °C)-99.2 °F (37.3 °C)] 98.4 °F (36.9  °C)  Pulse:  [75-91] 91  Resp:  [12-26] 24  SpO2:  [90 %-94 %] 90 %  BP: (101-125)/(58-78) 112/69     Weight: 116.6 kg (257 lb) (07/29/24 1830)  Body mass index is 42.77 kg/m².       Physical Exam  Constitutional:       Appearance: She is obese.   HENT:      Head: Normocephalic and atraumatic.      Mouth/Throat:      Mouth: Mucous membranes are moist.   Eyes:      Extraocular Movements: Extraocular movements intact.      Conjunctiva/sclera: Conjunctivae normal.      Pupils: Pupils are equal, round, and reactive to light.   Cardiovascular:      Rate and Rhythm: Normal rate and regular rhythm.      Heart sounds: Normal heart sounds.   Pulmonary:      Effort: Pulmonary effort is normal. No respiratory distress.      Breath sounds: Normal breath sounds. No wheezing or rhonchi.   Abdominal:      General: There is distension.      Tenderness: There is no abdominal tenderness.   Musculoskeletal:         General: Swelling present. Normal range of motion.      Cervical back: Normal range of motion and neck supple.      Right lower leg: Edema present.      Left lower leg: Edema present.   Skin:     General: Skin is warm and dry.   Neurological:      Mental Status: She is alert and oriented to person, place, and time.   Psychiatric:         Mood and Affect: Mood normal.            MELD 3.0: 12 at 7/29/2024  2:27 PM  MELD-Na: 9 at 7/29/2024  2:27 PM  Calculated from:  Serum Creatinine: 1.2 mg/dL at 7/29/2024  2:27 PM  Serum Sodium: 141 mmol/L (Using max of 137 mmol/L) at 7/29/2024  2:27 PM  Total Bilirubin: 0.6 mg/dL (Using min of 1 mg/dL) at 7/28/2024  5:10 AM  Serum Albumin: 2.4 g/dL at 7/29/2024  2:27 PM  INR(ratio): 1.1 at 7/27/2024  4:35 AM  Age at listing (hypothetical): 59 years  Sex: Female at 7/29/2024  2:27 PM      Significant Labs:  Labs within the past month have been reviewed.    Significant Imaging:  Labs: Reviewed    Assessment/Plan:     Cardiac/Vascular  * Acute on chronic diastolic heart failure  Patient with  reported history of diastolic HF. EF 60% but noted RV low systolic function and PASP 54 mm Hg although normal central pressure. Unclear how much cardiac contribution there is to her volume overload.     Recommendations:    - Consider cardiology consult to assist with determining etiology of volume overload    Endocrine  Volume overload  Volume overload of unclear etiology. Noted to have imaging findings of cirrhosis and presenting with ascites. Synthetic function appears stable. No pathological evidence of cirrhosis at this time. Paracentesis studies obtained on presentation showed:    SAAG  1.0 (does not support liver etiology) with total ascitic protein of 3.2 (does not support liver etiology).    Repeat paracentesis with removal of 10L on 7/30 showed:    SAAG of 1.5 (supports liver etiology) with total ascitic protein of 2.9 (does not support liver etiology)    Cytology pending. Clinical picture is more likely supportive of cardiac etiology of volume overload but will need liver Bx to assist in determining definitive diagnosis.       Recommendations:    - Consult IR and obtain liver Bx for pathology  - Continue adequate diuresis and monitor output  - F/U pending cirrhosis serologies to rule out any other etiologies of liver disease (PETH, autoimmune markers etc. - ordered)    GI  NAFLD (nonalcoholic fatty liver disease)  Patient with medical history of obesity and imaging suggestive of cirrhosis        Thank you for your consult. I will follow-up with patient. Please contact us if you have any additional questions.    Rosario Diehl MD  Hepatology  [facility] ([place])

## 2024-08-01 NOTE — PLAN OF CARE
Discharge Plan A and Plan B have been determined by review of patient's clinical status, future medical and therapeutic needs, and coverage/benefits for post-acute care in coordination with multidisciplinary team members.    08/01/24 0800   Discharge Reassessment   Assessment Type Discharge Planning Reassessment   Did the patient's condition or plan change since previous assessment? No   Discharge Plan discussed with: Patient;Spouse/sig other   Name(s) and Number(s) Zully Arita (Sister)  [phone] (Mobile)   Communicated RAYO with patient/caregiver Yes   Discharge Plan A Skilled Nursing Facility   DME Needed Upon Discharge  other (see comments)  (TBD)   Transition of Care Barriers Mobility;Underinsured   Why the patient remains in the hospital Requires continued medical care   Post-Acute Status   Post-Acute Authorization Placement   Post-Acute Placement Status Patient List Provided   Patient choice form signed by patient/caregiver List from CMS Compare;List with quality metrics by geographic area provided   Discharge Delays (!) Procedure Scheduling (IR, OR, Labs, Echo, Cath, Echo, EEG)  (hepatology consulted)     CM met with patient  to discuss discharge planning.  Patients plan is to dc to SNF.      RAYO: 8/6/24      Discharge Recommendations:  moderate intensity therapy     Discharge Equipment Recommendations:(bariatric RW)     Barriers to discharge:current level of assistance required      3:110 pm -3:13 pm  CM called and spoke with Zully RINALDI (patients sister]  [phone] and emailed a SNF list    8/2/24  4:36 pm  SNF referrals sent       Debra Ga RN  Case Management  [facility]  [phone]

## 2024-08-01 NOTE — ASSESSMENT & PLAN NOTE
Volume overload of unclear etiology. Noted to have imaging findings of cirrhosis and presenting with ascites. Synthetic function appears stable. No pathological evidence of cirrhosis at this time. Paracentesis studies obtained on presentation showed:    SAAG  1.0 (does not support liver etiology) with total ascitic protein of 3.2 (does not support liver etiology).    Repeat paracentesis with removal of 10L on 7/30 showed:    SAAG of 1.5 (supports liver etiology) with total ascitic protein of 2.9 (does not support liver etiology)    Cytology pending. Clinical picture is more likely supportive of cardiac etiology of volume overload but will need liver Bx to assist in determining definitive diagnosis.       Recommendations:    - Consult IR and obtain liver Bx for pathology  - Continue adequate diuresis and monitor output  - F/U pending cirrhosis serologies to rule out any other etiologies of liver disease (PETH, autoimmune markers etc. - ordered)

## 2024-08-01 NOTE — PLAN OF CARE
8/01-pt rested through night on 4 L HFNC, PW in place. Complaints of constant leg pain, gave PRN pain med. Plan is to continue to diurese with lasix for respiratory improvement. Pt on BSM with 1200 fluid restriction and strict Is and Os.      Problem: Adult Inpatient Plan of Care  Goal: Plan of Care Review  Outcome: Progressing  Goal: Patient-Specific Goal (Individualized)  Outcome: Progressing  Goal: Absence of Hospital-Acquired Illness or Injury  Outcome: Progressing  Goal: Optimal Comfort and Wellbeing  Outcome: Progressing  Goal: Readiness for Transition of Care  Outcome: Progressing     Problem: Bariatric Environmental Safety  Goal: Safety Maintained with Care  Outcome: Progressing     Problem: Diabetes Comorbidity  Goal: Blood Glucose Level Within Targeted Range  Outcome: Progressing     Problem: Skin Injury Risk Increased  Goal: Skin Health and Integrity  Outcome: Progressing     Problem: Wound  Goal: Optimal Coping  Outcome: Progressing  Goal: Optimal Functional Ability  Outcome: Progressing  Goal: Absence of Infection Signs and Symptoms  Outcome: Progressing  Goal: Improved Oral Intake  Outcome: Progressing  Goal: Skin Health and Integrity  Outcome: Progressing  Goal: Optimal Wound Healing  Outcome: Progressing

## 2024-08-01 NOTE — SUBJECTIVE & OBJECTIVE
Review of Systems   Constitutional:  Negative for chills and fever.   HENT:  Negative for congestion and sore throat.    Eyes:  Negative for photophobia and discharge.   Respiratory:  Negative for cough and shortness of breath.    Cardiovascular:  Positive for leg swelling. Negative for chest pain and palpitations.   Gastrointestinal:  Positive for abdominal distention. Negative for constipation, diarrhea, nausea and vomiting.   Musculoskeletal:  Negative for myalgias.   Skin:  Negative for rash.   Neurological:  Negative for seizures.   Psychiatric/Behavioral:  Negative for behavioral problems.        Past Medical History:   Diagnosis Date    Anticoagulant long-term use     Arthritis     Atrial fibrillation     cardioversion    Atrial fibrillation with RVR     HAS WATCHMAN IN PLACE NOW    Avascular necrosis     L hand    CHF (congestive heart failure)     Chronic fatigue     Depression     Diabetes mellitus     Encounter for blood transfusion 07/22/2020    Encounter for blood transfusion 03/2022    Fatty liver     GERD (gastroesophageal reflux disease)     Hx of psychiatric care     Hypertension     Iron deficiency anemia 05/07/2022    TIBC 444 with saturated iron 8    Liver disease     Obese     Pre-diabetes 05/07/2022    Psychiatric problem     Sleep apnea     wears cpap    SOB (shortness of breath) on exertion     Weight loss     75lb intentional weight loss       Past Surgical History:   Procedure Laterality Date    ABLATION OF ARRHYTHMOGENIC FOCUS FOR ATRIAL FIBRILLATION N/A 07/20/2020    Procedure: Ablation atrial fibrillation;  Surgeon: Gabriel Hawley MD;  Location: [facility] EP LAB;  Service: Cardiology;  Laterality: N/A;  afib, PVI, RFA, REENA, SHADY, anes, MB, 3 Prep    ABLATION OF ARRHYTHMOGENIC FOCUS FOR ATRIAL FIBRILLATION N/A 01/24/2022    Procedure: Ablation atrial fibrillation;  Surgeon: Gabriel Hawley MD;  Location: [facility] EP LAB;  Service: Cardiology;  Laterality: N/A;  afib/afl, PVI (re-do)/CTI,  RFA, REENA (cx if SR), SHADY, anes, MB, 3 Prep    APPLICATION OF WOUND VACUUM-ASSISTED CLOSURE DEVICE Left 08/03/2020    Procedure: APPLICATION, WOUND VAC;  Surgeon: SEMAJ Márquez III, MD;  Location: [facility] OR 2ND FLR;  Service: Peripheral Vascular;  Laterality: Left;    APPLICATION OF WOUND VACUUM-ASSISTED CLOSURE DEVICE Left 08/06/2020    Procedure: APPLICATION, WOUND VAC;  Surgeon: SEMAJ Márquez III, MD;  Location: [facility] OR 2ND FLR;  Service: Peripheral Vascular;  Laterality: Left;    CARPAL TUNNEL RELEASE Right 06/10/2020    Procedure: RELEASE, CARPAL TUNNEL - RIGHT;  Surgeon: Adelaida Hall MD;  Location: [facility] OR;  Service: Orthopedics;  Laterality: Right;  GENERAL AND REGIONAL    CARPAL TUNNEL RELEASE Left 05/05/2021    Procedure: RELEASE, CARPAL TUNNEL, LEFT;  Surgeon: Adelaida Hall MD;  Location: [facility] OR;  Service: Orthopedics;  Laterality: Left;  GENERAL & REGIONAL IN PACU    CARPECTOMY Left 9/6/2023    Procedure: CARPECTOMY;  Surgeon: Adelaida Hall MD;  Location: [facility] OR;  Service: Orthopedics;  Laterality: Left;  MAC/REGIONAL  LEFT PROXIMAL ROW    CLOSURE OF WOUND Left 08/06/2020    Procedure: CLOSURE, WOUND;  Surgeon: SEMAJ Márquez III, MD;  [address];  Service: Peripheral Vascular;  Laterality: Left;  Complex    COLONOSCOPY N/A 08/17/2021    Procedure: COLONOSCOPY Suprep;  Surgeon: Loco Deluca MD;  Location: [facility];  Service: Endoscopy;  Laterality: N/A;    ECHOCARDIOGRAM,TRANSESOPHAGEAL N/A 07/24/2023    Procedure: Transesophageal echo (REENA) intra-procedure log documentation;  Surgeon: [facility];  Location: [facility] EP LAB;  Service: Cardiology;  Laterality: N/A;  s/p WM, REENA, anes, 3 Prep    ESOPHAGOGASTRODUODENOSCOPY N/A 08/17/2021    Procedure: EGD (ESOPHAGOGASTRODUODENOSCOPY);  Surgeon: Loco Deluca MD;  Location: [facility];  Service: Endoscopy;  Laterality: N/A;  Patient is schedule to have her [facility] [place]    EXPLORATION OF FEMORAL ARTERY Left 07/21/2020    Procedure: EXPLORATION, ARTERY, FEMORAL;  Surgeon: SEMAJ Márquez III, MD;  Location: [address];  Service: Peripheral Vascular;  Laterality: Left;  1. Urgent Direct repair, L SFA branch laceration    FOOT SURGERY      HYSTERECTOMY      HYSTEROSCOPY WITH DILATION AND CURETTAGE OF UTERUS N/A 02/19/2022    Procedure: HYSTEROSCOPY, WITH DILATION AND CURETTAGE OF UTERUS;  Surgeon: Shane Palomo MD;  Location: [address];  Service: OB/GYN;  Laterality: N/A;    INCISION AND DRAINAGE OF KNEE Left 05/12/2022    Procedure: INCISION AND DRAINAGE, Prepatellar bursectomy.;  Surgeon: Dre Guzmán MD;  Location: [address];  Service: Orthopedics;  Laterality: Left;    LEFT HEART CATHETERIZATION Left 02/07/2023    Procedure: Left heart cath;  Surgeon: Josiah Terry MD;  Location: [facility] CATH LAB;  Service: Cardiology;  Laterality: Left;  borderline moderate bleeding risk 6.3%    OCCLUSION OF LEFT ATRIAL APPENDAGE N/A 06/12/2023    Procedure: Left atrial appendage occlusion;  Surgeon: Gabriel Hawley MD;  Location: [facility] EP LAB;  Service: Cardiology;  Laterality: N/A;  afib, watchman, BSCI, demi, anes, MB, 3prep    RELEASE OF ULNAR NERVE AT CUBITAL TUNNEL Bilateral     ROBOT-ASSISTED LAPAROSCOPIC ABDOMINAL HYSTERECTOMY USING DA KEIRY XI N/A 08/09/2022    Procedure: XI ROBOTIC HYSTERECTOMY;  Surgeon: Yolanda Barriga MD;  Location: [facility];  Service: OB/GYN;  Laterality: N/A;  dual console requested    ROBOT-ASSISTED LAPAROSCOPIC SALPINGO-OOPHORECTOMY Bilateral 08/09/2022    Procedure: ROBOTIC SALPINGO-OOPHORECTOMY;  Surgeon: Yolanda Barriga MD;  Location: [facility];  Service: OB/GYN;  Laterality: Bilateral;    TRANSESOPHAGEAL ECHOCARDIOGRAPHY N/A 06/12/2023    Procedure: ECHOCARDIOGRAM, TRANSESOPHAGEAL;  Surgeon: Cyril Mast MD;  Location: [facility] EP LAB;  Service: Cardiology;  Laterality: N/A;    TREATMENT OF  CARDIAC ARRHYTHMIA N/A 2020    Procedure: CARDIOVERSION;  Surgeon: Gabriel Hawley MD;  Location: [facility] EP LAB;  Service: Cardiology;  Laterality: N/A;  af, demi, dccv, anes, mb, 345    TREATMENT OF CARDIAC ARRHYTHMIA  2022    Procedure: Cardioversion or Defibrillation;  Surgeon: Gabriel Hawley MD;  Location: [facility] EP LAB;  Service: Cardiology;;    WOUND DEBRIDEMENT Left 2020    Procedure: DEBRIDEMENT, WOUND;  Surgeon: SEMAJ Márquez III, MD;  [facility];  Service: Peripheral Vascular;  Laterality: Left;       Review of patient's allergies indicates:   Allergen Reactions    Flecainide Shortness Of Breath and Swelling    Shellfish containing products Other (See Comments)     Makes gout terrible    Vancomycin analogues Hives, Itching and Rash         Tobacco Use    Smoking status: Former     Current packs/day: 0.00     Types: Cigarettes     Quit date: 2019     Years since quittin.0    Smokeless tobacco: Never   Substance and Sexual Activity    Alcohol use: No    Drug use: No    Sexual activity: Not Currently     Partners: Male     Birth control/protection: See Surgical Hx       Medications Prior to Admission   Medication Sig Dispense Refill Last Dose    acetaminophen (TYLENOL) 500 MG tablet Take 2 tablets (1,000 mg total) by mouth 2 (two) times daily as needed for Pain. Begin post op day 3. (Patient taking differently: Take 1,000 mg by mouth every evening. Begin post op day 3.) 50 tablet 0     albuterol (VENTOLIN HFA) 90 mcg/actuation inhaler Inhale 2 puffs into the lungs every 6 (six) hours as needed for Wheezing. Rescue (Patient taking differently: Inhale 2 puffs into the lungs daily as needed for Wheezing or Shortness of Breath. Rescue) 18 g 6     ALPRAZolam (XANAX) 0.5 MG tablet Take 1 tablet (0.5 mg total) by mouth 2 (two) times daily as needed for Anxiety. (Patient taking differently: Take 0.5 mg by mouth daily as needed for Anxiety.) 60 tablet 4     aspirin  (ECOTRIN) 81 MG EC tablet Take 81 mg by mouth once daily.       atorvastatin (LIPITOR) 80 MG tablet Take 1 tablet (80 mg total) by mouth once daily. (Patient taking differently: Take 80 mg by mouth every evening.) 90 tablet 3     b complex vitamins capsule Take 1 capsule by mouth every other day.       colchicine (COLCRYS) 0.6 mg tablet For gout attack: Take TWO tablets by mouth, then, 2 hours later, take ONE additional tablet. Then take 1 tablet twice daily only if still needed for gout pain. (Patient taking differently: Take 0.6 mg by mouth daily as needed (For gout attack: Take TWO tablets by mouth, then, 2 hours later, take ONE additional tablet. Then take 1 tablet twice daily only if still needed for gout pain.).) 20 tablet 4     DULoxetine (CYMBALTA) 60 MG capsule Take 2 capsules (120 mg total) by mouth once daily. 180 capsule 3     lisinopriL (PRINIVIL,ZESTRIL) 40 MG tablet Take 1 tablet (40 mg total) by mouth once daily. 90 tablet 3     metoprolol succinate (TOPROL-XL) 100 MG 24 hr tablet Take 1 tablet (100 mg total) by mouth 2 (two) times daily. 180 tablet 3     multivitamin (THERAGRAN) per tablet Take 1 tablet by mouth once daily.       NIFEdipine (PROCARDIA-XL) 60 MG (OSM) 24 hr tablet Take 1 tablet (60 mg total) by mouth once daily. 30 tablet 6     omeprazole (PRILOSEC) 20 MG capsule TAKE 1 CAPSULE BY MOUTH ONCE DAILY (Patient taking differently: Take 20 mg by mouth daily as needed (hearturn/ indegestion).) 90 capsule 3        Objective:     Vital Signs (Most Recent):  Temp: 98.4 °F (36.9 °C) (08/01/24 0755)  Pulse: 91 (08/01/24 0755)  Resp: (!) 24 (08/01/24 0755)  BP: 112/69 (08/01/24 0755)  SpO2: (!) 90 % (08/01/24 0755) Vital Signs (24h Range):  Temp:  [97.8 °F (36.6 °C)-99.2 °F (37.3 °C)] 98.4 °F (36.9 °C)  Pulse:  [75-91] 91  Resp:  [12-26] 24  SpO2:  [90 %-94 %] 90 %  BP: (101-125)/(58-78) 112/69     Weight: 116.6 kg (257 lb) (07/29/24 1830)  Body mass index is 42.77 kg/m².       Physical  Exam  Constitutional:       Appearance: She is obese.   HENT:      Head: Normocephalic and atraumatic.      Mouth/Throat:      Mouth: Mucous membranes are moist.   Eyes:      Extraocular Movements: Extraocular movements intact.      Conjunctiva/sclera: Conjunctivae normal.      Pupils: Pupils are equal, round, and reactive to light.   Cardiovascular:      Rate and Rhythm: Normal rate and regular rhythm.      Heart sounds: Normal heart sounds.   Pulmonary:      Effort: Pulmonary effort is normal. No respiratory distress.      Breath sounds: Normal breath sounds. No wheezing or rhonchi.   Abdominal:      General: There is distension.      Tenderness: There is no abdominal tenderness.   Musculoskeletal:         General: Swelling present. Normal range of motion.      Cervical back: Normal range of motion and neck supple.      Right lower leg: Edema present.      Left lower leg: Edema present.   Skin:     General: Skin is warm and dry.   Neurological:      Mental Status: She is alert and oriented to person, place, and time.   Psychiatric:         Mood and Affect: Mood normal.            MELD 3.0: 12 at 7/29/2024  2:27 PM  MELD-Na: 9 at 7/29/2024  2:27 PM  Calculated from:  Serum Creatinine: 1.2 mg/dL at 7/29/2024  2:27 PM  Serum Sodium: 141 mmol/L (Using max of 137 mmol/L) at 7/29/2024  2:27 PM  Total Bilirubin: 0.6 mg/dL (Using min of 1 mg/dL) at 7/28/2024  5:10 AM  Serum Albumin: 2.4 g/dL at 7/29/2024  2:27 PM  INR(ratio): 1.1 at 7/27/2024  4:35 AM  Age at listing (hypothetical): 59 years  Sex: Female at 7/29/2024  2:27 PM      Significant Labs:  Labs within the past month have been reviewed.    Significant Imaging:  Labs: Reviewed

## 2024-08-01 NOTE — ASSESSMENT & PLAN NOTE
Patient with reported history of diastolic HF. EF 60% but noted RV low systolic function and PASP 54 mm Hg although normal central pressure. Unclear how much cardiac contribution there is to her volume overload.     Recommendations:    - Consider cardiology consult to assist with determining etiology of volume overload   declines

## 2024-08-01 NOTE — NURSING
Pt with confusion, pulled off clothes, EKG leads and removed IV. Telesitter was not on, called and followed up and they stated no order was entered for the patient. Charge Rn went to attempt to reinsert new IV pt refused not allowing her to touch her.

## 2024-08-01 NOTE — ASSESSMENT & PLAN NOTE
Cardiorenal syndrome  Acute HF exacerbation of uncertain etiology (no missed doses of lasix). Last TTE with EF 60% and G2DD. Concern that she may have new R-sided HF given anasarca (large volume ascites) versus now concomitant MASH cirrhosis compounding volume overloaded state and therefore needs higher diuresis at baseline. Suspect JHONATAN d.t overload - CRS.     Total 17L removed via para + 8L UOP  TTE with intermediate diastolic dysfunction with EF 60-65%, minor aortic stenosis, and moderate pulmonary hypertension with estimated pulmonary artery systolic pressure is 54 mmHg.    - Lasix 80mg IV BID  - Started spironolactone 50mg  - RFP BID  - BIPAP qhs and during naps     - Fluid restriction at 1200 cc with strict I/Os and daily weights   - Check Electrolytes, keep Mag >2 & K+ >4  - Ambulate as tolerated

## 2024-08-01 NOTE — PT/OT/SLP PROGRESS
Physical Therapy Treatment  Co-treatment with OT due to acuity of condition, level of skilled assist needed for assessment of safety with mobility and potential of not tolerating a second treatment session.     Patient Name:  Vicky Alvarado   MRN:  [id number]    Recommendations:     Discharge Recommendations: Moderate Intensity Therapy  Discharge Equipment Recommendations:  (bariatric RW)  Barriers to discharge:  current level of assistance required     Assessment:     Vicky Alvarado is a 59 y.o. female admitted with a medical diagnosis of Acute on chronic diastolic heart failure.  She presents with the following impairments/functional limitations: weakness, impaired endurance, impaired self care skills, pain, impaired functional mobility, decreased safety awareness, decreased lower extremity function, decreased upper extremity function, impaired cardiopulmonary response to activity Pt tolerated treatment session fairly well today. Pt requiring assistance for bed mobility, and transfers. Pt was able to tolerate EOB sitting well. Pt was able to stand for a brief period, yet was limited due to significant feet pain. Patient remains appropriate for continued skilled services within the acute environment and goals remain appropriate.   .    Rehab Prognosis: Good; patient would benefit from acute skilled PT services to address these deficits and reach maximum level of function.    Recent Surgery: * No surgery found *      Plan:     During this hospitalization, patient to be seen 4 x/week to address the identified rehab impairments via gait training, therapeutic activities, therapeutic exercises, neuromuscular re-education and progress toward the following goals:    Plan of Care Expires:  08/29/24    Subjective     Chief Complaint: Legs and feet pain   Patient/Family Comments/goals: Pt agreeable to PT   Pain/Comfort:  Pain Rating 1: 9/10  Location - Side 1: Bilateral  Location - Orientation 1:  generalized  Location 1:  (legs and feet)  Pain Addressed 1: Reposition, Distraction, Cessation of Activity  Pain Rating Post-Intervention 1:  (not rated)      Objective:     Communicated with Rn prior to session.  Patient found supine with telemetry, pulse ox (continuous), oxygen, peripheral IV, PureWick upon PT entry to room.     General Precautions: Standard, fall  Orthopedic Precautions: N/A  Braces: N/A  Respiratory Status: Nasal cannula     Functional Mobility:  Bed Mobility:     Supine to Sit: maximal assistance and of 2 persons  EOB sitting: CGA   Sit to Supine: maximal assistance and of 2 persons    Transfers:     Sit to Stand:  maximal assistance and of 2 persons with hand-held assist  ~75% clearance, B foot pain limiting time in stance       AM-PAC 6 CLICK MOBILITY  Turning over in bed (including adjusting bedclothes, sheets and blankets)?: 3  Sitting down on and standing up from a chair with arms (e.g., wheelchair, bedside commode, etc.): 2  Moving from lying on back to sitting on the side of the bed?: 2  Moving to and from a bed to a chair (including a wheelchair)?: 2  Need to walk in hospital room?: 2  Climbing 3-5 steps with a railing?: 1  Basic Mobility Total Score: 12       Treatment & Education:  Therapist provided instruction and educated for safety during bed mobility and transfers As well as proper body mechanics, energy conservation, and fall prevention strategies during tasks listed above, and the effects of prolonged immobility and the importance of performing EOB/OOB activity and exercises to promote healing and reduce recovery time.       Patient left supine with all lines intact, call button in reach, and Rn notified..    GOALS:   Multidisciplinary Problems       Physical Therapy Goals          Problem: Physical Therapy    Goal Priority Disciplines Outcome Goal Variances Interventions   Physical Therapy Goal     PT, PT/OT Progressing     Description: Goals to be met by: 08/29/2024      Patient will increase functional independence with mobility by performin. Supine to sit with Stand-by Assistance  2. Sit to supine with Stand-by Assistance  3. Sit to stand transfer with Supervision  4. Bed to chair transfer with Supervision using LRAD  5. Gait  x 100 feet with Contact Guard Assistance using LRAD.   6. Ascend/descend 1 stair with no Handrails Contact Guard Assistance using LRAD.     The mobility limitation cannot be sufficiently resolved by the use of a cane.   Patient's functional mobility deficit can be sufficiently resolved with the use of a (bariatric Rolling Walker or Walker).  Patient's mobility limitation significantly impairs their ability to participate in one of more activities of daily living.  The use of a (bariatric RW or Walker) will significantly improve the patient's ability to participate in MRADLS and the patient will use it on regular basis in the home.                           Time Tracking:     PT Received On: 24  PT Start Time: 1144     PT Stop Time: 1210  PT Total Time (min): 26 min     Billable Minutes: Therapeutic Activity 18 and Therapeutic Exercise 8    Treatment Type: Treatment  PT/PTA: PTA     Number of PTA visits since last PT visit: 2     2024

## 2024-08-01 NOTE — HPI
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 She also describes ongoing weakness.     Per chart review, she has had steady increase in weight and abdominal girth for several months with an accelerated progression of symptoms over the last week. She presented with increased oxygen requirements and tachypnea and was initially placed on NRB. Labs were significant for hypokalemia 2.9 and elevated Cr to 1.8 with BNP of 410.     CTAP to evaluate abdominal distention showed cirrhotic morphology of the liver and large volume ascites. Liver U/S on presentation showed cirrhotic liver morphology with portal HTN, splenomegaly and moderate volume ascites. Paracentesis was completed with removal of 7L of fluid. Initial SAAG was 1.0 with total ascitic protein of 3.2. Repeat paracentesis with removal of 10L on 7/30 had SAAG of 1.5 with total ascitic protein of 2.9. Cytology pending.     Of note Echo cardiogram performed showed indeterminate diastolic function, RV systolic function boderline low, PASP of 54 but normal CVP (3 mm Hg) and EF of 60 - 65%. As an outpatient, she was planned to have transjugular biopsy with pressure measurments to determine if she has cirrhosis histologically but appears to have been lost to follow up since 4/18     Hepatology was consulted for consideration of liver biopsy in the setting of volume overload to determine if hepatic etiology.

## 2024-08-01 NOTE — PLAN OF CARE
PT AAO X3. Vitals stable, on 2L NC. on bedside tele monitor. PO and IV potassium and magnesium replacement given as order. Ate most her food. 1 BM this shift. I & O on flowsheets. No acute events this shift. Tele sitter in use. Safety precaution in place.       Problem: Adult Inpatient Plan of Care  Goal: Plan of Care Review  Outcome: Progressing  Goal: Patient-Specific Goal (Individualized)  Outcome: Progressing  Goal: Absence of Hospital-Acquired Illness or Injury  Outcome: Progressing  Goal: Optimal Comfort and Wellbeing  Outcome: Progressing  Goal: Readiness for Transition of Care  Outcome: Progressing     Problem: Bariatric Environmental Safety  Goal: Safety Maintained with Care  Outcome: Progressing     Problem: Skin Injury Risk Increased  Goal: Skin Health and Integrity  Outcome: Progressing     Problem: Pain Acute  Goal: Optimal Pain Control and Function  Outcome: Progressing

## 2024-08-01 NOTE — SUBJECTIVE & OBJECTIVE
Interval History: Improved mentation this AM and feels to have slept better overnight after receiving melatonin. Patient is experiencing delirium, precautions in place. Hepatology consulted for consideration of inpatient liver biopsy, will f/u recs.       Objective:     Vital Signs (Most Recent):  Temp: 98.4 °F (36.9 °C) (08/01/24 0755)  Pulse: 68 (08/01/24 1600)  Resp: 19 (08/01/24 1600)  BP: 110/66 (08/01/24 1600)  SpO2: (!) 94 % (08/01/24 1600) Vital Signs (24h Range):  Temp:  [97.8 °F (36.6 °C)-99.2 °F (37.3 °C)] 98.4 °F (36.9 °C)  Pulse:  [68-91] 68  Resp:  [12-26] 19  SpO2:  [90 %-94 %] 94 %  BP: (101-125)/(58-78) 110/66     Weight: 116.6 kg (257 lb)  Body mass index is 42.77 kg/m².    Intake/Output Summary (Last 24 hours) at 8/1/2024 1622  Last data filed at 8/1/2024 1342  Gross per 24 hour   Intake 580 ml   Output 2650 ml   Net -2070 ml         Physical Exam  Constitutional:       General: She is not in acute distress.     Appearance: She is obese. She is not ill-appearing.   HENT:      Head: Normocephalic and atraumatic.   Eyes:      General: No scleral icterus.  Cardiovascular:      Rate and Rhythm: Normal rate and regular rhythm.   Pulmonary:      Effort: Pulmonary effort is normal.      Breath sounds: Normal breath sounds.      Comments: 2L NC  Abdominal:      General: Bowel sounds are normal. There is distension.      Tenderness: There is no abdominal tenderness. There is no guarding or rebound.      Comments: Abdomen is still distended but deflated some since last paracentesis. No tenderness    Musculoskeletal:      Right lower leg: Edema present.      Left lower leg: Edema present.      Comments: Edema improved since admission. Slight pitting edema.  Patient wearing compression stockings.    Skin:     General: Skin is warm and dry.      Coloration: Skin is not jaundiced.   Neurological:      General: No focal deficit present.      Mental Status: She is alert and oriented to person, place, and time.  Mental status is at baseline.   Psychiatric:         Mood and Affect: Mood normal.         Behavior: Behavior normal.             Significant Labs: All pertinent labs within the past 24 hours have been reviewed.    Significant Imaging: I have reviewed all pertinent imaging results/findings within the past 24 hours.

## 2024-08-01 NOTE — PT/OT/SLP PROGRESS
Occupational Therapy   Co-Treatment    Co-treatment performed due to patient's multiple deficits requiring two skilled therapists to appropriately and safely assess patient's strength and endurance while facilitating functional tasks in addition to accommodating for patient's activity tolerance.       Name: Vicky Alvarado  MRN: [id number]  Admitting Diagnosis:  Acute on chronic diastolic heart failure       Recommendations:     Discharge Recommendations: Moderate Intensity Therapy  Discharge Equipment Recommendations:  walker, rolling  Barriers to discharge:  None    Assessment:     Vicky Alvarado is a 59 y.o. female with a medical diagnosis of Acute on chronic diastolic heart failure.  Pt alert and cooperative and required increased motivation to participate. She tolerated session fairly and was limited due to pain from gout in BLE. Pt required MaxA x2 for all bed mobility and STS trial. Pt is significantly below PLOF and would benefit from continued skilled therapy to improve the following performance deficits affecting function: weakness, impaired endurance, impaired self care skills, impaired functional mobility, gait instability, impaired balance, decreased lower extremity function, pain, decreased safety awareness.     Rehab Prognosis:  Fair; patient would benefit from acute skilled OT services to address these deficits and reach maximum level of function.       Plan:     Patient to be seen 4 x/week to address the above listed problems via self-care/home management, therapeutic exercises, therapeutic activities, neuromuscular re-education  Plan of Care Expires: 08/29/24  Plan of Care Reviewed with: patient    Subjective     Chief Complaint: pain in BLE  Patient/Family Comments/goals: patient agreed to therapy     Pain/Comfort:  Pain Rating 1: 9/10  Location - Side 1: Bilateral  Location - Orientation 1: generalized  Location 1: other (see comments) (legs and feet)  Pain Addressed 1: Reposition,  Distraction, Cessation of Activity    Objective:     Communicated with: NSG prior to session.  Patient found supine with oxygen, PureWick, peripheral IV, pulse ox (continuous), telemetry upon OT entry to room.    General Precautions: Standard, fall    Orthopedic Precautions:N/A  Braces: N/A  Respiratory Status: Nasal cannula, flow     Occupational Performance:     Bed Mobility:    Patient completed Rolling/Turning to Left with  maximal assistance and 2 persons  Patient completed Scooting/Bridging with maximal assistance and 2 persons  Patient completed Supine to Sit with maximal assistance and 2 persons  Patient completed Sit to Supine with maximal assistance and 2 persons   EOB Balance: CGA-SBA ~10 mins  Functional Mobility/Transfers:  Patient completed Sit <> Stand Transfer with maximal assistance and of 2 persons  with  no assistive device and completion of 75% stand   Functional Mobility: unable to perform     Activities of Daily Living:  Grooming: performed oral care sitting EOB w/ set up (A)        [facility] 6 Click ADL: 16    Treatment & Education:  Role of OT and POC   Bed Mobility   EOB Sitting Balance Tolerance   Importance of EOB activity   ADL retraining       Patient left supine with all lines intact, call button in reach, and all needs met    GOALS:   Multidisciplinary Problems       Occupational Therapy Goals          Problem: Occupational Therapy    Goal Priority Disciplines Outcome Interventions   Occupational Therapy Goal     OT, PT/OT Progressing    Description: Goals to be met by: 8/5/2024     Patient will increase functional independence with ADLs by performing:    UE Dressing with Set-up Assistance.  LE Dressing with Minimal Assistance.  Grooming while standing at sink with Supervision.  Toileting from toilet with Supervision for hygiene and clothing management.   Toilet transfer to toilet with Supervision.                         Time Tracking:     OT Date of Treatment: 08/01/24  OT Start Time:  1144  OT Stop Time: 1210  OT Total Time (min): 26 min    Billable Minutes:Therapeutic Activity 16  Neuromuscular Re-education 10    OT/GASPER: OT          8/1/2024

## 2024-08-01 NOTE — PROGRESS NOTES
[facility] - Stepdown Flex ([place] [id number])  [facility] Medicine  Progress Note    Patient Name: Vicky Alvarado  MRN: [id number]  Patient Class: IP- Inpatient   Admission Date: 7/26/2024  Length of Stay: 5 days  Attending Physician: Ramon Toure DO  Primary Care Provider: Gordy Cordero MD        Subjective:     Principal Problem:Acute on chronic diastolic heart failure        HPI:  Patient is a 59F with CHF (EF 60%, G2DD), HTN, Afib (s/p watchman procedure), T2DM (prev. on tirzepatide) presents to the ED w/ minor fall and progressively worsening SOB. Notes she couldn't get up after her fall, came in in a wheelchair. Notes she has had SOB for the past month with exertion, though now it is with rest as well. Reports orthopnea during this time frame. States she does not have any pain with breathing, but does additionally endorse occasional, intermittent chest pain. Denies abdominal pain, fever, or chills. States she has early satiety. No prior admission for HF exacerbation. Reports experiencing b/l lower extremity edema previously, but denies prior abdominal swelling. Swelling precluding normal ambulation, using walker (previously for balance) to assist d.t weakness. Has not missed doses of home lasix or antihypertensive medications. No reduced urine output at home.     In ED, normotensive, no tachycardia, some tachypnea (RR 22-28), initially on NRB -> HFNC 12L -> BIPAP -> 6L. No desaturations recorded during transitions in O2 therapy (BIPAP primarily placed for pulmonary edema seen on CXR). CBC with normocytic anemia (Hb 10.6), no leukocytosis. CMP with hypokalemia K 2.9, Cr 1.8/BUN 27 (baseline appears to be 1-1.4), Alb 2.7, no LFT elevation. Mg 1.5. , Troponin normal. VBG 7.46 / 42. CT AP with cirrhotic liver morphology, questionable GB sludge/stones, large volume ascites. CXR with hypoventilatory changes, R>L perihilar infiltrates c/f CHF. EKG NSR.  Received K replacement, 100mg IV Lasix in ED.        Overview/Hospital Course:  Patient is here for SOB due to volume overload. Patient has had CHF exacerbation in the past but also has evidence of a cirrhosis requiring a paracentesis in the ED in which they removed 7 L of fluid. The ascitic fluid was sent to lab and there is no evidence of SBP at this time. Abdominal CT shows a cirrhotic morphology of the liver. US/doppler of  liver shows evidence of cirrhosis, splenotmegally, and portal hypertension. She shows no signs of jaundice. Her liver enzymes, Alk Phos, and bilirubin are wnl. Protein studies of the ascitic fluid show increased protein leaning more towards a cardiac etiology.  CXR shows heart to be boarderline in regards to being enlarged and some perihilar infiltrate. ECHO on (7/29/24) showed intermediate diastolic dysfunction with EF 60-65%, minor aortic stenosis, and moderate pulmonary hypertension with estimated pulmonary artery systolic pressure is 54 mmHg.Paracentesis performed (7/30/24) removed an additional 9.8L.  She has had some problems with confusion overnight. Believe that home BIPAP slipping off and patient not getting adequate saturation may be playing a role. Oriented by morning. PT/OT recommends acute skilled PT after discharge.     Interval History: Improved mentation this AM and feels to have slept better overnight after receiving melatonin. Patient is experiencing delirium, precautions in place. Hepatology consulted for consideration of inpatient liver biopsy, will f/u recs.       Objective:     Vital Signs (Most Recent):  Temp: 98.4 °F (36.9 °C) (08/01/24 0755)  Pulse: 68 (08/01/24 1600)  Resp: 19 (08/01/24 1600)  BP: 110/66 (08/01/24 1600)  SpO2: (!) 94 % (08/01/24 1600) Vital Signs (24h Range):  Temp:  [97.8 °F (36.6 °C)-99.2 °F (37.3 °C)] 98.4 °F (36.9 °C)  Pulse:  [68-91] 68  Resp:  [12-26] 19  SpO2:  [90 %-94 %] 94 %  BP: (101-125)/(58-78) 110/66     Weight: 116.6 kg (257 lb)  Body mass index is 42.77 kg/m².    Intake/Output  Summary (Last 24 hours) at 8/1/2024 1622  Last data filed at 8/1/2024 1342  Gross per 24 hour   Intake 580 ml   Output 2650 ml   Net -2070 ml         Physical Exam  Constitutional:       General: She is not in acute distress.     Appearance: She is obese. She is not ill-appearing.   HENT:      Head: Normocephalic and atraumatic.   Eyes:      General: No scleral icterus.  Cardiovascular:      Rate and Rhythm: Normal rate and regular rhythm.   Pulmonary:      Effort: Pulmonary effort is normal.      Breath sounds: Normal breath sounds.      Comments: 2L NC  Abdominal:      General: Bowel sounds are normal. There is distension.      Tenderness: There is no abdominal tenderness. There is no guarding or rebound.      Comments: Abdomen is still distended but deflated some since last paracentesis. No tenderness    Musculoskeletal:      Right lower leg: Edema present.      Left lower leg: Edema present.      Comments: Edema improved since admission. Slight pitting edema.  Patient wearing compression stockings.    Skin:     General: Skin is warm and dry.      Coloration: Skin is not jaundiced.   Neurological:      General: No focal deficit present.      Mental Status: She is alert and oriented to person, place, and time. Mental status is at baseline.   Psychiatric:         Mood and Affect: Mood normal.         Behavior: Behavior normal.             Significant Labs: All pertinent labs within the past 24 hours have been reviewed.    Significant Imaging: I have reviewed all pertinent imaging results/findings within the past 24 hours.    Assessment/Plan:      * Acute on chronic diastolic heart failure  Cardiorenal syndrome  Acute HF exacerbation of uncertain etiology (no missed doses of lasix). Last TTE with EF 60% and G2DD. Concern that she may have new R-sided HF given anasarca (large volume ascites) versus now concomitant MASH cirrhosis compounding volume overloaded state and therefore needs higher diuresis at baseline.  Suspect JHONATAN d.t overload - CRS.     Total 17L removed via para + 8L UOP  TTE with intermediate diastolic dysfunction with EF 60-65%, minor aortic stenosis, and moderate pulmonary hypertension with estimated pulmonary artery systolic pressure is 54 mmHg.    - Lasix 80mg IV BID  - Started spironolactone 50mg  - RFP BID  - BIPAP qhs and during naps     - Fluid restriction at 1200 cc with strict I/Os and daily weights   - Check Electrolytes, keep Mag >2 & K+ >4  - Ambulate as tolerated      Anasarca  Concerned anasarca may represent sequelae of cirrhosis. EGD 8/2021 with Portal hypertensive gastropathy. No varices. No abd pain, fever, chills, or confusion to raise concern for SBP or HE respectively    - Liver doppler  - Aldactone and Lasix for diuresis  - Favor albumin if hypotensive  - LVP   -Initial SBP Gram stain negative for WBC making SBP less likely  -f/u on ascities fluid anaerobic and aerobic cultures      Stage 3b chronic kidney disease  Improved    Creatine stable for now. BMP reviewed- noted Estimated Creatinine Clearance: 63.6 mL/min (based on SCr of 1.2 mg/dL). according to latest data. Based on current GFR, CKD stage is stage 3 - GFR 30-59.  Monitor UOP and serial BMP and adjust therapy as needed. Renally dose meds. Avoid nephrotoxic medications and procedures.    NAFLD (nonalcoholic fatty liver disease)  Seen by Dr. Saba with hepatology for previous chronic mild elevation in LFT, determined most likely NADLF. Now with cirrhotic morphology on CT. Previous EGD with portal gastropathy. LFT normal on admit. 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     - Liver US with Doppler  - Started on spironolactone 25mg   - Lasix 80 tid   -Lasix has been transitioned to 80 BID      Type 2 diabetes mellitus with diabetic polyneuropathy, without long-term current use of insulin  Patient's FSGs are controlled on current medication regimen.  Last A1c reviewed-   Lab Results   Component  Value Date    HGBA1C 4.9 07/27/2024     Blood sugars are staying in the 80s-90s  Current correctional scale   holding as BG stable    Hold Oral hypoglycemics while patient is in the hospital.    Atrial Fibrillation (paroxysmal)  Patient with Paroxysmal (<7 days) atrial fibrillation which is controlled currently with Beta Blocker (Metoprolol succinate 100 mg BID) Patient is currently in sinus rhythm.[id number] Score: 3. Anticoagulation not indicated due to has Watchman .    Essential hypertension  Chronic, controlled. Latest blood pressure and vitals reviewed-     Temp:  [97.5 °F (36.4 °C)-98.5 °F (36.9 °C)]   Pulse:  [60-81]   Resp:  [12-22]   BP: ()/(57-69)   SpO2:  [68 %-100 %] .   Home meds for hypertension were reviewed and noted below.   Hypertension Medications               furosemide (LASIX) 40 MG tablet Take 1 tablet (40 mg total) by mouth 2 (two) times daily. Resume as needed for edema until followup with your PCP.    lisinopriL (PRINIVIL,ZESTRIL) 40 MG tablet Take 1 tablet (40 mg total) by mouth once daily.    metoprolol succinate (TOPROL-XL) 100 MG 24 hr tablet Take 1 tablet (100 mg total) by mouth 2 (two) times daily.    NIFEdipine (PROCARDIA-XL) 60 MG (OSM) 24 hr tablet Take 1 tablet (60 mg total) by mouth once daily.            While in the hospital, will manage blood pressure as follows; Adjust home antihypertensive regimen as follows- Continue toprol for afib, hold other antihypertensives d/t need for aggressive diuresis plus presence of JHONATAN. Did add spironolactone for K retention and now presence of ascites in setting of possible cirrhosis     Will utilize p.r.n. blood pressure medication only if patient's blood pressure greater than 220/110 and she develops symptoms such as worsening chest pain or shortness of breath.      VTE Risk Mitigation (From admission, onward)           Ordered     heparin (porcine) injection 7,500 Units  Every 8 hours         07/29/24 1202     IP VTE HIGH RISK  PATIENT  Once         07/27/24 0351     Place sequential compression device  Until discontinued         07/27/24 0351                    Discharge Planning   RAYO: 8/6/2024     Code Status: Full Code   Is the patient medically ready for discharge?:     Reason for patient still in hospital (select all that apply): Treatment  Discharge Plan A: Skilled Nursing Facility   Discharge Delays: (!) Procedure Scheduling (IR, OR, Labs, Echo, Cath, Echo, EEG) (hepatology consulted)              Delmar Ross MD  Department of Hospital Medicine   [facility] - Stepdown Flex ([place]-[facility])

## 2024-08-02 LAB
A1AT SERPL-MCNC: 373 MG/DL (ref 100–190)
AFP SERPL-MCNC: <2 NG/ML (ref 0–8.4)
ALBUMIN SERPL BCP-MCNC: 2.2 G/DL (ref 3.5–5.2)
ALBUMIN SERPL BCP-MCNC: 2.2 G/DL (ref 3.5–5.2)
ALP SERPL-CCNC: 119 U/L (ref 55–135)
ALT SERPL W/O P-5'-P-CCNC: 5 U/L (ref 10–44)
ANA PATTERN 1: NORMAL
ANA SER QL IF: POSITIVE
ANA TITR SER IF: NORMAL {TITER}
ANION GAP SERPL CALC-SCNC: 12 MMOL/L (ref 8–16)
AST SERPL-CCNC: 18 U/L (ref 10–40)
BASOPHILS # BLD AUTO: 0.02 K/UL (ref 0–0.2)
BASOPHILS NFR BLD: 0.2 % (ref 0–1.9)
BILIRUB DIRECT SERPL-MCNC: 0.8 MG/DL (ref 0.1–0.3)
BILIRUB SERPL-MCNC: 1.2 MG/DL (ref 0.1–1)
BUN SERPL-MCNC: 18 MG/DL (ref 6–20)
CALCIUM SERPL-MCNC: 9.1 MG/DL (ref 8.7–10.5)
CHLORIDE SERPL-SCNC: 92 MMOL/L (ref 95–110)
CO2 SERPL-SCNC: 35 MMOL/L (ref 23–29)
CREAT SERPL-MCNC: 0.9 MG/DL (ref 0.5–1.4)
DIFFERENTIAL METHOD BLD: ABNORMAL
EOSINOPHIL # BLD AUTO: 0.1 K/UL (ref 0–0.5)
EOSINOPHIL NFR BLD: 1 % (ref 0–8)
ERYTHROCYTE [DISTWIDTH] IN BLOOD BY AUTOMATED COUNT: 14.2 % (ref 11.5–14.5)
EST. GFR  (NO RACE VARIABLE): >60 ML/MIN/1.73 M^2
FERRITIN SERPL-MCNC: 288 NG/ML (ref 20–300)
GLUCOSE SERPL-MCNC: 109 MG/DL (ref 70–110)
HBV CORE AB SERPL QL IA: NORMAL
HBV CORE IGM SERPL QL IA: NORMAL
HBV SURFACE AB SER-ACNC: 7.56 MIU/ML
HBV SURFACE AB SER-ACNC: NORMAL M[IU]/ML
HBV SURFACE AG SERPL QL IA: NORMAL
HCT VFR BLD AUTO: 30.4 % (ref 37–48.5)
HGB BLD-MCNC: 9.5 G/DL (ref 12–16)
IGG SERPL-MCNC: 1107 MG/DL (ref 650–1600)
IMM GRANULOCYTES # BLD AUTO: 0.05 K/UL (ref 0–0.04)
IMM GRANULOCYTES NFR BLD AUTO: 0.5 % (ref 0–0.5)
IRON SERPL-MCNC: 20 UG/DL (ref 30–160)
LYMPHOCYTES # BLD AUTO: 0.6 K/UL (ref 1–4.8)
LYMPHOCYTES NFR BLD: 6.4 % (ref 18–48)
MAGNESIUM SERPL-MCNC: 1.7 MG/DL (ref 1.6–2.6)
MCH RBC QN AUTO: 28.1 PG (ref 27–31)
MCHC RBC AUTO-ENTMCNC: 31.3 G/DL (ref 32–36)
MCV RBC AUTO: 90 FL (ref 82–98)
MITOCHONDRIA AB TITR SER IF: NORMAL {TITER}
MONOCYTES # BLD AUTO: 1 K/UL (ref 0.3–1)
MONOCYTES NFR BLD: 10.9 % (ref 4–15)
NEUTROPHILS # BLD AUTO: 7.7 K/UL (ref 1.8–7.7)
NEUTROPHILS NFR BLD: 81 % (ref 38–73)
NRBC BLD-RTO: 0 /100 WBC
PHOSPHATE SERPL-MCNC: 2.5 MG/DL (ref 2.7–4.5)
PLATELET # BLD AUTO: 162 K/UL (ref 150–450)
PMV BLD AUTO: 10 FL (ref 9.2–12.9)
POTASSIUM SERPL-SCNC: 3.8 MMOL/L (ref 3.5–5.1)
PROT SERPL-MCNC: 6 G/DL (ref 6–8.4)
RBC # BLD AUTO: 3.38 M/UL (ref 4–5.4)
SATURATED IRON: 11 % (ref 20–50)
SMOOTH MUSCLE AB TITR SER IF: NORMAL {TITER}
SODIUM SERPL-SCNC: 139 MMOL/L (ref 136–145)
TOTAL IRON BINDING CAPACITY: 185 UG/DL (ref 250–450)
TRANSFERRIN SERPL-MCNC: 125 MG/DL (ref 200–375)
WBC # BLD AUTO: 9.44 K/UL (ref 3.9–12.7)

## 2024-08-02 PROCEDURE — 25000003 PHARM REV CODE 250

## 2024-08-02 PROCEDURE — 63600175 PHARM REV CODE 636 W HCPCS

## 2024-08-02 PROCEDURE — 86235 NUCLEAR ANTIGEN ANTIBODY: CPT | Mod: 59 | Performed by: STUDENT IN AN ORGANIZED HEALTH CARE EDUCATION/TRAINING PROGRAM

## 2024-08-02 PROCEDURE — 82728 ASSAY OF FERRITIN: CPT | Performed by: STUDENT IN AN ORGANIZED HEALTH CARE EDUCATION/TRAINING PROGRAM

## 2024-08-02 PROCEDURE — 83735 ASSAY OF MAGNESIUM: CPT

## 2024-08-02 PROCEDURE — 20600001 HC STEP DOWN PRIVATE ROOM

## 2024-08-02 PROCEDURE — 86381 MITOCHONDRIAL ANTIBODY EACH: CPT | Performed by: STUDENT IN AN ORGANIZED HEALTH CARE EDUCATION/TRAINING PROGRAM

## 2024-08-02 PROCEDURE — 82784 ASSAY IGA/IGD/IGG/IGM EACH: CPT | Performed by: STUDENT IN AN ORGANIZED HEALTH CARE EDUCATION/TRAINING PROGRAM

## 2024-08-02 PROCEDURE — 82105 ALPHA-FETOPROTEIN SERUM: CPT | Performed by: STUDENT IN AN ORGANIZED HEALTH CARE EDUCATION/TRAINING PROGRAM

## 2024-08-02 PROCEDURE — 80069 RENAL FUNCTION PANEL: CPT | Performed by: STUDENT IN AN ORGANIZED HEALTH CARE EDUCATION/TRAINING PROGRAM

## 2024-08-02 PROCEDURE — 94799 UNLISTED PULMONARY SVC/PX: CPT

## 2024-08-02 PROCEDURE — 87340 HEPATITIS B SURFACE AG IA: CPT | Performed by: STUDENT IN AN ORGANIZED HEALTH CARE EDUCATION/TRAINING PROGRAM

## 2024-08-02 PROCEDURE — 27100171 HC OXYGEN HIGH FLOW UP TO 24 HOURS

## 2024-08-02 PROCEDURE — 86038 ANTINUCLEAR ANTIBODIES: CPT | Performed by: STUDENT IN AN ORGANIZED HEALTH CARE EDUCATION/TRAINING PROGRAM

## 2024-08-02 PROCEDURE — 97112 NEUROMUSCULAR REEDUCATION: CPT | Mod: CQ

## 2024-08-02 PROCEDURE — 84466 ASSAY OF TRANSFERRIN: CPT | Performed by: STUDENT IN AN ORGANIZED HEALTH CARE EDUCATION/TRAINING PROGRAM

## 2024-08-02 PROCEDURE — 86225 DNA ANTIBODY NATIVE: CPT | Performed by: STUDENT IN AN ORGANIZED HEALTH CARE EDUCATION/TRAINING PROGRAM

## 2024-08-02 PROCEDURE — 97110 THERAPEUTIC EXERCISES: CPT

## 2024-08-02 PROCEDURE — 82103 ALPHA-1-ANTITRYPSIN TOTAL: CPT | Performed by: STUDENT IN AN ORGANIZED HEALTH CARE EDUCATION/TRAINING PROGRAM

## 2024-08-02 PROCEDURE — 86015 ACTIN ANTIBODY EACH: CPT | Performed by: STUDENT IN AN ORGANIZED HEALTH CARE EDUCATION/TRAINING PROGRAM

## 2024-08-02 PROCEDURE — 97535 SELF CARE MNGMENT TRAINING: CPT

## 2024-08-02 PROCEDURE — 80076 HEPATIC FUNCTION PANEL: CPT

## 2024-08-02 PROCEDURE — 97530 THERAPEUTIC ACTIVITIES: CPT

## 2024-08-02 PROCEDURE — 94660 CPAP INITIATION&MGMT: CPT

## 2024-08-02 PROCEDURE — 99900035 HC TECH TIME PER 15 MIN (STAT)

## 2024-08-02 PROCEDURE — 63600175 PHARM REV CODE 636 W HCPCS: Performed by: STUDENT IN AN ORGANIZED HEALTH CARE EDUCATION/TRAINING PROGRAM

## 2024-08-02 PROCEDURE — 85025 COMPLETE CBC W/AUTO DIFF WBC: CPT

## 2024-08-02 PROCEDURE — 80321 ALCOHOLS BIOMARKERS 1OR 2: CPT | Performed by: STUDENT IN AN ORGANIZED HEALTH CARE EDUCATION/TRAINING PROGRAM

## 2024-08-02 PROCEDURE — 86039 ANTINUCLEAR ANTIBODIES (ANA): CPT | Performed by: STUDENT IN AN ORGANIZED HEALTH CARE EDUCATION/TRAINING PROGRAM

## 2024-08-02 PROCEDURE — 86704 HEP B CORE ANTIBODY TOTAL: CPT | Performed by: STUDENT IN AN ORGANIZED HEALTH CARE EDUCATION/TRAINING PROGRAM

## 2024-08-02 PROCEDURE — 94761 N-INVAS EAR/PLS OXIMETRY MLT: CPT

## 2024-08-02 PROCEDURE — 86706 HEP B SURFACE ANTIBODY: CPT | Mod: 91 | Performed by: STUDENT IN AN ORGANIZED HEALTH CARE EDUCATION/TRAINING PROGRAM

## 2024-08-02 PROCEDURE — 86705 HEP B CORE ANTIBODY IGM: CPT | Performed by: STUDENT IN AN ORGANIZED HEALTH CARE EDUCATION/TRAINING PROGRAM

## 2024-08-02 RX ORDER — LOPERAMIDE HYDROCHLORIDE 2 MG/1
2 CAPSULE ORAL ONCE AS NEEDED
Status: COMPLETED | OUTPATIENT
Start: 2024-08-02 | End: 2024-08-02

## 2024-08-02 RX ORDER — SODIUM,POTASSIUM PHOSPHATES 280-250MG
2 POWDER IN PACKET (EA) ORAL EVERY 4 HOURS
Status: COMPLETED | OUTPATIENT
Start: 2024-08-02 | End: 2024-08-02

## 2024-08-02 RX ORDER — MAGNESIUM SULFATE HEPTAHYDRATE 40 MG/ML
2 INJECTION, SOLUTION INTRAVENOUS ONCE
Status: COMPLETED | OUTPATIENT
Start: 2024-08-02 | End: 2024-08-02

## 2024-08-02 RX ADMIN — ALPRAZOLAM 0.25 MG: 0.25 TABLET ORAL at 09:08

## 2024-08-02 RX ADMIN — METOPROLOL SUCCINATE 100 MG: 100 TABLET, EXTENDED RELEASE ORAL at 09:08

## 2024-08-02 RX ADMIN — FUROSEMIDE 80 MG: 10 INJECTION, SOLUTION INTRAVENOUS at 09:08

## 2024-08-02 RX ADMIN — POTASSIUM & SODIUM PHOSPHATES POWDER PACK 280-160-250 MG 2 PACKET: 280-160-250 PACK at 09:08

## 2024-08-02 RX ADMIN — HEPARIN SODIUM 7500 UNITS: 5000 INJECTION INTRAVENOUS; SUBCUTANEOUS at 01:08

## 2024-08-02 RX ADMIN — HEPARIN SODIUM 7500 UNITS: 5000 INJECTION INTRAVENOUS; SUBCUTANEOUS at 05:08

## 2024-08-02 RX ADMIN — ASPIRIN 81 MG: 81 TABLET, COATED ORAL at 09:08

## 2024-08-02 RX ADMIN — POTASSIUM & SODIUM PHOSPHATES POWDER PACK 280-160-250 MG 2 PACKET: 280-160-250 PACK at 01:08

## 2024-08-02 RX ADMIN — MAGNESIUM SULFATE HEPTAHYDRATE 2 G: 40 INJECTION, SOLUTION INTRAVENOUS at 11:08

## 2024-08-02 RX ADMIN — ATORVASTATIN CALCIUM 80 MG: 40 TABLET, FILM COATED ORAL at 09:08

## 2024-08-02 RX ADMIN — PROCHLORPERAZINE EDISYLATE 5 MG: 5 INJECTION INTRAMUSCULAR; INTRAVENOUS at 10:08

## 2024-08-02 RX ADMIN — DULOXETINE HYDROCHLORIDE 60 MG: 30 CAPSULE, DELAYED RELEASE ORAL at 09:08

## 2024-08-02 RX ADMIN — LOPERAMIDE HYDROCHLORIDE 2 MG: 2 CAPSULE ORAL at 02:08

## 2024-08-02 RX ADMIN — ACETAMINOPHEN 650 MG: 325 TABLET ORAL at 09:08

## 2024-08-02 RX ADMIN — HEPARIN SODIUM 7500 UNITS: 5000 INJECTION INTRAVENOUS; SUBCUTANEOUS at 09:08

## 2024-08-02 RX ADMIN — ACETAMINOPHEN 650 MG: 325 TABLET ORAL at 01:08

## 2024-08-02 RX ADMIN — SPIRONOLACTONE 50 MG: 25 TABLET ORAL at 09:08

## 2024-08-02 RX ADMIN — Medication 3 MG: at 09:08

## 2024-08-02 NOTE — TREATMENT PLAN
"Hepatology Treatment Plan    Vicky Alvarado is a 59 y.o. female admitted to hospital 7/26/2024 (Hospital Day: 8) due to Acute on chronic diastolic heart failure.     Interval History  NAEON. Unclear if missed UOP as charted +230 ccs. Cirrhosis serologies pending.     Objective  Temp:  [97.7 °F (36.5 °C)-98.1 °F (36.7 °C)] 98.1 °F (36.7 °C) (08/02 0745)  Pulse:  [68-96] 84 (08/02 0745)  BP: ()/(56-66) 99/62 (08/02 0745)  Resp:  [18-23] 20 (08/02 0745)  SpO2:  [83 %-97 %] 95 % (08/02 0745)      Laboratory    Recent Labs   Lab 08/02/24  0248   WBC 9.44   RBC 3.38*   HGB 9.5*   HCT 30.4*      MCV 90   MCH 28.1   MCHC 31.3*      Recent Labs   Lab 08/02/24  0248   CALCIUM 9.1   ALBUMIN 2.2*  2.2*   PROT 6.0      K 3.8   CO2 35*   CL 92*   BUN 18   CREATININE 0.9   ALKPHOS 119   ALT 5*   AST 18   BILITOT 1.2*      No results for input(s): "PT", "INR", "APTT" in the last 24 hours.     MELD 3.0: 12 at 7/29/2024  2:27 PM  MELD-Na: 9 at 7/29/2024  2:27 PM  Calculated from:  Serum Creatinine: 1.2 mg/dL at 7/29/2024  2:27 PM  Serum Sodium: 141 mmol/L (Using max of 137 mmol/L) at 7/29/2024  2:27 PM  Total Bilirubin: 0.6 mg/dL (Using min of 1 mg/dL) at 7/28/2024  5:10 AM  Serum Albumin: 2.4 g/dL at 7/29/2024  2:27 PM  INR(ratio): 1.1 at 7/27/2024  4:35 AM  Age at listing (hypothetical): 59 years  Sex: Female at 7/29/2024  2:27 PM         Assessment and Plan  59 year old female with imaging findings suggestive of cirrhosis presenting with volume overload of unclear etiology, possibly cardiac in nature given abnormal echocardiogram findings.     Problem List:  Volume overload   Acute on chronic diastolic heart failure      Plan:    - Consult IR and obtain liver Bx for pathology  - Consider cardiology consult to assist with determining etiology of volume overload   - Continue adequate diuresis and monitor output  - F/U pending cirrhosis serologies to rule out any other etiologies of liver disease (PET, " autoimmune markers etc. - ordered)      - We will continue to follow.    Plan of care discussed with attending Dr. Pendleton. Thank you for involving us in the care of Vicky Alvarado. Please call with any additional questions, concerns or changes in the patient's clinical status.    Rosario Diehl MD  Internal Medicine, PGY-3

## 2024-08-02 NOTE — PLAN OF CARE
08/2-Pt with multiple loose stools >5, MD notified, ordered Loperamide with no suspicion of Cdiff. Pt afebrile with no stomach cramps or pain. Plan is to continue to diurese and monitor breathing status, pt decreased to 2L NC from 4L. Also Liver Biopsy TBD. Telesitter present for intermittent confusion with pt.      Problem: Adult Inpatient Plan of Care  Goal: Plan of Care Review  Outcome: Progressing  Goal: Patient-Specific Goal (Individualized)  Outcome: Progressing  Goal: Absence of Hospital-Acquired Illness or Injury  Outcome: Progressing  Goal: Optimal Comfort and Wellbeing  Outcome: Progressing  Goal: Readiness for Transition of Care  Outcome: Progressing     Problem: Bariatric Environmental Safety  Goal: Safety Maintained with Care  Outcome: Progressing     Problem: Skin Injury Risk Increased  Goal: Skin Health and Integrity  Outcome: Progressing     Problem: Wound  Goal: Optimal Coping  Outcome: Progressing  Goal: Absence of Infection Signs and Symptoms  Outcome: Progressing  Goal: Improved Oral Intake  Outcome: Progressing  Goal: Optimal Pain Control and Function  Outcome: Progressing  Goal: Skin Health and Integrity  Outcome: Progressing     Problem: Pain Acute  Goal: Optimal Pain Control and Function  Outcome: Progressing     Problem: Liver Failure  Goal: Optimal Coping with Liver Failure  Outcome: Progressing  Goal: Blood Glucose Level Within Target Range  Outcome: Progressing  Goal: Optimal Coagulation Function  Outcome: Progressing  Goal: Absence of Infection Signs and Symptoms  Outcome: Progressing  Goal: Optimal Neurologic Function  Outcome: Progressing  Goal: Improved Oral Intake  Outcome: Progressing  Goal: Optimal Pain Control, Comfort and Function  Outcome: Progressing  Goal: Optimize Renal Function  Outcome: Progressing  Goal: Effective Oxygenation and Ventilation  Outcome: Progressing

## 2024-08-02 NOTE — SUBJECTIVE & OBJECTIVE
Interval History: IR consulted; will perform liver biopsy to help narrow distinguish between either cardiogenic or cirrhosis caused ascites. Studies on asitic fluid were variable. Procedure either scheduled for this evening or for tomorrow. Respiratory therapy switched out her home BIPAP mask and her saturations were improved form the previous night. She reports sleeping better on melatonin and seems less confused this morning.      Review of Systems   Constitutional:  Negative for chills and diaphoresis.   Respiratory:  Positive for shortness of breath. Negative for cough.         SOB still present but improved; down to 2L   Cardiovascular:  Positive for leg swelling. Negative for chest pain and palpitations.   Gastrointestinal:  Positive for abdominal distention. Negative for nausea and vomiting.        Patient reports increased abdominal distention from yesterday (prior to today's paracentesis)    Genitourinary:  Negative for difficulty urinating, dysuria and hematuria.   Musculoskeletal:  Positive for arthralgias.        Complains of pain in her legs. Patient fell before coming to the ED. States she is hurting this morning   Neurological:  Negative for dizziness and headaches.     Objective:     Vital Signs (Most Recent):  Temp: 98.3 °F (36.8 °C) (08/02/24 1550)  Pulse: 88 (08/02/24 1550)  Resp: 17 (08/02/24 1550)  BP: 104/72 (08/02/24 1550)  SpO2: 96 % (08/02/24 1550) Vital Signs (24h Range):  Temp:  [97.7 °F (36.5 °C)-98.3 °F (36.8 °C)] 98.3 °F (36.8 °C)  Pulse:  [76-96] 88  Resp:  [17-23] 17  SpO2:  [83 %-97 %] 96 %  BP: ()/(56-72) 104/72     Weight: 101.1 kg (222 lb 14.2 oz)  Body mass index is 37.09 kg/m².    Intake/Output Summary (Last 24 hours) at 8/2/2024 1805  Last data filed at 8/1/2024 1835  Gross per 24 hour   Intake 350 ml   Output --   Net 350 ml         Physical Exam  Constitutional:       General: She is not in acute distress.     Appearance: She is obese. She is not ill-appearing.   HENT:       Head: Normocephalic and atraumatic.   Eyes:      General: No scleral icterus.  Cardiovascular:      Rate and Rhythm: Normal rate and regular rhythm.   Pulmonary:      Effort: Pulmonary effort is normal.      Breath sounds: Normal breath sounds.      Comments: Patient on 4 L nasal canula  Abdominal:      General: Bowel sounds are normal. There is distension.      Tenderness: There is no abdominal tenderness. There is no guarding or rebound.      Comments: Abdomen is still distended but deflated some since last paracentesis. No tenderness    Musculoskeletal:      Right lower leg: Edema present.      Left lower leg: Edema present.      Comments: Edema improved since admission. Slight pitting edema.  Patient wearing compression stockings.    Skin:     General: Skin is warm and dry.      Coloration: Skin is not jaundiced.   Neurological:      Mental Status: She is alert and oriented to person, place, and time.   Psychiatric:         Mood and Affect: Mood normal.         Behavior: Behavior normal.             Significant Labs: All pertinent labs within the past 24 hours have been reviewed.    Significant Imaging: I have reviewed all pertinent imaging results/findings within the past 24 hours.

## 2024-08-02 NOTE — PROGRESS NOTES
[facility] - Stepdown Flex ([place] [id number])  [facility] Medicine  Progress Note    Patient Name: Vicky Alvarado  MRN: [id number]  Patient Class: IP- Inpatient   Admission Date: 7/26/2024  Length of Stay: 6 days  Attending Physician: Ramon Toure DO  Primary Care Provider: Gordy Cordero MD        Subjective:     Principal Problem:Acute on chronic diastolic heart failure        HPI:  Patient is a 59F with CHF (EF 60%, G2DD), HTN, Afib (s/p watchman procedure), T2DM (prev. on tirzepatide) presents to the ED w/ minor fall and progressively worsening SOB. Notes she couldn't get up after her fall, came in in a wheelchair. Notes she has had SOB for the past month with exertion, though now it is with rest as well. Reports orthopnea during this time frame. States she does not have any pain with breathing, but does additionally endorse occasional, intermittent chest pain. Denies abdominal pain, fever, or chills. States she has early satiety. No prior admission for HF exacerbation. Reports experiencing b/l lower extremity edema previously, but denies prior abdominal swelling. Swelling precluding normal ambulation, using walker (previously for balance) to assist d.t weakness. Has not missed doses of home lasix or antihypertensive medications. No reduced urine output at home.     In ED, normotensive, no tachycardia, some tachypnea (RR 22-28), initially on NRB -> HFNC 12L -> BIPAP -> 6L. No desaturations recorded during transitions in O2 therapy (BIPAP primarily placed for pulmonary edema seen on CXR). CBC with normocytic anemia (Hb 10.6), no leukocytosis. CMP with hypokalemia K 2.9, Cr 1.8/BUN 27 (baseline appears to be 1-1.4), Alb 2.7, no LFT elevation. Mg 1.5. , Troponin normal. VBG 7.46 / 42. CT AP with cirrhotic liver morphology, questionable GB sludge/stones, large volume ascites. CXR with hypoventilatory changes, R>L perihilar infiltrates c/f CHF. EKG NSR.  Received K replacement, 100mg IV Lasix in ED.        Overview/Hospital Course:  Patient is here for SOB due to volume overload. Patient has had CHF exacerbation in the past but also has evidence of a cirrhosis requiring a paracentesis in the ED in which they removed 7 L of fluid. The ascitic fluid was sent to lab and there is no evidence of SBP at this time. Abdominal CT shows a cirrhotic morphology of the liver. US/doppler of  liver shows evidence of cirrhosis, splenotmegally, and portal hypertension. She shows no signs of jaundice. Her liver enzymes, Alk Phos, and bilirubin are wnl. Protein studies of the ascitic fluid show increased protein leaning more towards a cardiac etiology.  CXR shows heart to be boarderline in regards to being enlarged and some perihilar infiltrate. ECHO on (7/29/24) showed intermediate diastolic dysfunction with EF 60-65%, minor aortic stenosis, and moderate pulmonary hypertension with estimated pulmonary artery systolic pressure is 54 mmHg.Paracentesis performed (7/30/24) removed an additional 9.8L.  She has had some problems with confusion overnight. Believe that home BIPAP slipping off and patient not getting adequate saturation may be playing a role. Oriented by morning. Respiratory therapy switched out her mask and saturations overnight improved. Studies on ascitic fluid from paracentesis have been somewhat mixed in pointing to the heart or the kidney. IR consulted for liver biopsy to help narrow down the cause of ascites. PT/OT recommends acute skilled PT after discharge.     Interval History: IR consulted; will perform liver biopsy to help narrow distinguish between either cardiogenic or cirrhosis caused ascites. Studies on asitic fluid were variable. Procedure either scheduled for this evening or for tomorrow. Respiratory therapy switched out her home BIPAP mask and her saturations were improved form the previous night. She reports sleeping better on melatonin and seems less confused this morning.      Review of Systems    Constitutional:  Negative for chills and diaphoresis.   Respiratory:  Positive for shortness of breath. Negative for cough.         SOB still present but improved; down to 2L   Cardiovascular:  Positive for leg swelling. Negative for chest pain and palpitations.   Gastrointestinal:  Positive for abdominal distention. Negative for nausea and vomiting.        Patient reports increased abdominal distention from yesterday (prior to today's paracentesis)    Genitourinary:  Negative for difficulty urinating, dysuria and hematuria.   Musculoskeletal:  Positive for arthralgias.        Complains of pain in her legs. Patient fell before coming to the ED. States she is hurting this morning   Neurological:  Negative for dizziness and headaches.     Objective:     Vital Signs (Most Recent):  Temp: 98.3 °F (36.8 °C) (08/02/24 1550)  Pulse: 88 (08/02/24 1550)  Resp: 17 (08/02/24 1550)  BP: 104/72 (08/02/24 1550)  SpO2: 96 % (08/02/24 1550) Vital Signs (24h Range):  Temp:  [97.7 °F (36.5 °C)-98.3 °F (36.8 °C)] 98.3 °F (36.8 °C)  Pulse:  [76-96] 88  Resp:  [17-23] 17  SpO2:  [83 %-97 %] 96 %  BP: ()/(56-72) 104/72     Weight: 101.1 kg (222 lb 14.2 oz)  Body mass index is 37.09 kg/m².    Intake/Output Summary (Last 24 hours) at 8/2/2024 1805  Last data filed at 8/1/2024 1835  Gross per 24 hour   Intake 350 ml   Output --   Net 350 ml         Physical Exam  Constitutional:       General: She is not in acute distress.     Appearance: She is obese. She is not ill-appearing.   HENT:      Head: Normocephalic and atraumatic.   Eyes:      General: No scleral icterus.  Cardiovascular:      Rate and Rhythm: Normal rate and regular rhythm.   Pulmonary:      Effort: Pulmonary effort is normal.      Breath sounds: Normal breath sounds.      Comments: Patient on 4 L nasal canula  Abdominal:      General: Bowel sounds are normal. There is distension.      Tenderness: There is no abdominal tenderness. There is no guarding or rebound.       Comments: Abdomen is still distended but deflated some since last paracentesis. No tenderness    Musculoskeletal:      Right lower leg: Edema present.      Left lower leg: Edema present.      Comments: Edema improved since admission. Slight pitting edema.  Patient wearing compression stockings.    Skin:     General: Skin is warm and dry.      Coloration: Skin is not jaundiced.   Neurological:      Mental Status: She is alert and oriented to person, place, and time.   Psychiatric:         Mood and Affect: Mood normal.         Behavior: Behavior normal.             Significant Labs: All pertinent labs within the past 24 hours have been reviewed.    Significant Imaging: I have reviewed all pertinent imaging results/findings within the past 24 hours.    Assessment/Plan:      * Acute on chronic diastolic heart failure  Cardiorenal syndrome  Acute HF exacerbation of uncertain etiology (no missed doses of lasix). Last TTE with EF 60% and G2DD. Concern that she may have new R-sided HF given anasarca (large volume ascites) versus now concomitant MASH cirrhosis compounding volume overloaded state and therefore needs higher diuresis at baseline. Suspect JHONATAN d.t overload - CRS.     Total 17L removed via para + 8L UOP  TTE with intermediate diastolic dysfunction with EF 60-65%, minor aortic stenosis, and moderate pulmonary hypertension with estimated pulmonary artery systolic pressure is 54 mmHg.    - Lasix 80mg IV BID  - Started spironolactone 50mg  - RFP BID  - BIPAP qhs and during naps     - Fluid restriction at 1200 cc with strict I/Os and daily weights   - Check Electrolytes, keep Mag >2 & K+ >4  - Ambulate as tolerated      Anasarca  Concerned anasarca may represent sequelae of cirrhosis. EGD 8/2021 with Portal hypertensive gastropathy. No varices. No abd pain, fever, chills, or confusion to raise concern for SBP or HE respectively    - Liver doppler  - Aldactone and Lasix for diuresis  - Favor albumin if hypotensive  -  LVP   -Initial SBP Gram stain negative for WBC making SBP less likely  -studies on ascites fluid  leaned slightly toward cardiac origin but some results pointed more towards the liver; Liver biopsy scheduled to help r/o the liver as the main cause.      Stage 3b chronic kidney disease  Improved    Creatine stable for now. BMP reviewed- noted Estimated Creatinine Clearance: 63.6 mL/min (based on SCr of 1.2 mg/dL). according to latest data. Based on current GFR, CKD stage is stage 3 - GFR 30-59.  Monitor UOP and serial BMP and adjust therapy as needed. Renally dose meds. Avoid nephrotoxic medications and procedures.    NAFLD (nonalcoholic fatty liver disease)  Seen by Dr. Saba with hepatology for previous chronic mild elevation in LFT, determined most likely NADLF. Now with cirrhotic morphology on CT. Previous EGD with portal gastropathy. LFT normal on admit. 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     - Liver US with Doppler  - Started on spironolactone 25mg   - Lasix 80 tid   -Lasix has been transitioned to 80 BID      Type 2 diabetes mellitus with diabetic polyneuropathy, without long-term current use of insulin  Patient's FSGs are controlled on current medication regimen.  Last A1c reviewed-   Lab Results   Component Value Date    HGBA1C 4.9 07/27/2024     Blood sugars are staying in the 80s-90s  Current correctional scale   holding as BG stable    Hold Oral hypoglycemics while patient is in the hospital.    Atrial Fibrillation (paroxysmal)  Patient with Paroxysmal (<7 days) atrial fibrillation which is controlled currently with Beta Blocker (Metoprolol succinate 100 mg BID) Patient is currently in sinus rhythm.[id number] Score: 3. Anticoagulation not indicated due to has Watchman .    Essential hypertension  Chronic, controlled. Latest blood pressure and vitals reviewed-     Temp:  [97.5 °F (36.4 °C)-98.5 °F (36.9 °C)]   Pulse:  [60-81]   Resp:  [12-22]   BP: ()/(57-69)    SpO2:  [68 %-100 %] .   Home meds for hypertension were reviewed and noted below.   Hypertension Medications               furosemide (LASIX) 40 MG tablet Take 1 tablet (40 mg total) by mouth 2 (two) times daily. Resume as needed for edema until followup with your PCP.    lisinopriL (PRINIVIL,ZESTRIL) 40 MG tablet Take 1 tablet (40 mg total) by mouth once daily.    metoprolol succinate (TOPROL-XL) 100 MG 24 hr tablet Take 1 tablet (100 mg total) by mouth 2 (two) times daily.    NIFEdipine (PROCARDIA-XL) 60 MG (OSM) 24 hr tablet Take 1 tablet (60 mg total) by mouth once daily.            While in the hospital, will manage blood pressure as follows; Adjust home antihypertensive regimen as follows- Continue toprol for afib, hold other antihypertensives d/t need for aggressive diuresis plus presence of JHONATAN. Did add spironolactone for K retention and now presence of ascites in setting of possible cirrhosis     Will utilize p.r.n. blood pressure medication only if patient's blood pressure greater than 220/110 and she develops symptoms such as worsening chest pain or shortness of breath.      VTE Risk Mitigation (From admission, onward)           Ordered     heparin (porcine) injection 7,500 Units  Every 8 hours         07/29/24 1202     IP VTE HIGH RISK PATIENT  Once         07/27/24 0351     Place sequential compression device  Until discontinued         07/27/24 0351                    Discharge Planning   RAYO: 8/8/2024     Code Status: Full Code   Is the patient medically ready for discharge?:     Reason for patient still in hospital (select all that apply): Patient trending condition, Laboratory test, and Treatment  Discharge Plan A: Skilled Nursing Facility   Discharge Delays: (!) Procedure Scheduling (IR, OR, Labs, Echo, Cath, Echo, EEG) (hepatology consulted)              Marah Castro MD  Department of Hospital Medicine   [facility] - Stepdown Flex ([place]-[facility])

## 2024-08-02 NOTE — PT/OT/SLP PROGRESS
Physical Therapy Treatment  Co-treatment with OT due to acuity of condition, level of skilled assist needed for assessment of safety with mobility and potential of not tolerating a second treatment session.     Patient Name:  Vicky Alvarado   MRN:  [id number]    Recommendations:     Discharge Recommendations: Moderate Intensity Therapy  Discharge Equipment Recommendations:  (bariatric RW)  Barriers to discharge:  current level of assistance required     Assessment:     Vicky Alvarado is a 59 y.o. female admitted with a medical diagnosis of Acute on chronic diastolic heart failure.  She presents with the following impairments/functional limitations: weakness, impaired endurance, impaired self care skills, impaired functional mobility, pain, decreased safety awareness, decreased lower extremity function, decreased upper extremity function, impaired cardiopulmonary response to activity Pt tolerated treatment session well today. Pt still requiring assistance for bed mobility, transfers and gait training. Pt c/o of B foot pain, which is a limiting factor with ambulation. Patient remains appropriate for continued skilled services within the acute environment and goals remain appropriate.   .    Rehab Prognosis: Good; patient would benefit from acute skilled PT services to address these deficits and reach maximum level of function.    Recent Surgery: * No surgery found *      Plan:     During this hospitalization, patient to be seen 4 x/week to address the identified rehab impairments via gait training, therapeutic activities, therapeutic exercises, neuromuscular re-education and progress toward the following goals:    Plan of Care Expires:  08/29/24    Subjective     Chief Complaint: B foot pain   Patient/Family Comments/goals: Pt agreeable to PT   Pain/Comfort:  Pain Rating 1:  (not rated)  Location - Side 1: Bilateral  Location - Orientation 1: generalized  Location 1: foot  Pain Addressed 1: Reposition,  Distraction, Cessation of Activity  Pain Rating Post-Intervention 1:  (not rated)      Objective:     Communicated with Rn prior to session.  Patient found  sitting EOB working with OT  with peripheral IV, telemetry, pulse ox (continuous), oxygen upon PT entry to room.     General Precautions: Standard, fall  Orthopedic Precautions: N/A  Braces: N/A  Respiratory Status: Nasal cannula     Functional Mobility:  Bed Mobility:     Scooting: moderate assistance  Sit to Supine: maximal assistance and of 2 persons  EOB sitting: CGA     Transfers:     Sit to Stand x 2:  moderate assistance and of 2 persons with rolling walker  Attempted t/f to bedside commode, yet pt unable to pivot feet  Further attempts held due to pt's pain level and safety       AM-PAC 6 CLICK MOBILITY  Turning over in bed (including adjusting bedclothes, sheets and blankets)?: 3  Sitting down on and standing up from a chair with arms (e.g., wheelchair, bedside commode, etc.): 2  Moving from lying on back to sitting on the side of the bed?: 2  Moving to and from a bed to a chair (including a wheelchair)?: 2  Need to walk in hospital room?: 2  Climbing 3-5 steps with a railing?: 1  Basic Mobility Total Score: 12       Treatment & Education:  Therapist provided instruction and educated for safety during bed mobility and transfers. As well as proper body mechanics, energy conservation, and fall prevention strategies during tasks listed above, and the effects of prolonged immobility and the importance of performing EOB/OOB activity and exercises to promote healing and reduce recovery time.       Patient left supine with all lines intact, call button in reach, Rn notified, and family present..    GOALS:   Multidisciplinary Problems       Physical Therapy Goals          Problem: Physical Therapy    Goal Priority Disciplines Outcome Goal Variances Interventions   Physical Therapy Goal     PT, PT/OT Progressing     Description: Goals to be met by: 08/29/2024      Patient will increase functional independence with mobility by performin. Supine to sit with Stand-by Assistance  2. Sit to supine with Stand-by Assistance  3. Sit to stand transfer with Supervision  4. Bed to chair transfer with Supervision using LRAD  5. Gait  x 100 feet with Contact Guard Assistance using LRAD.   6. Ascend/descend 1 stair with no Handrails Contact Guard Assistance using LRAD.     The mobility limitation cannot be sufficiently resolved by the use of a cane.   Patient's functional mobility deficit can be sufficiently resolved with the use of a (bariatric Rolling Walker or Walker).  Patient's mobility limitation significantly impairs their ability to participate in one of more activities of daily living.  The use of a (bariatric RW or Walker) will significantly improve the patient's ability to participate in MRADLS and the patient will use it on regular basis in the home.                           Time Tracking:     PT Received On: 24  PT Start Time: 1255     PT Stop Time: 1316  PT Total Time (min): 21 min     Billable Minutes: Neuromuscular Re-education 15    Treatment Type: Treatment  PT/PTA: PTA     Number of PTA visits since last PT visit: 3     2024

## 2024-08-02 NOTE — PLAN OF CARE
Problem: Adult Inpatient Plan of Care  Goal: Plan of Care Review  8/2/2024 1811 by Rowan Voss RN  Outcome: Progressing  8/2/2024 1811 by Rowan Voss RN  Outcome: Progressing  Goal: Patient-Specific Goal (Individualized)  8/2/2024 1811 by Rowan Voss RN  Outcome: Progressing  8/2/2024 1811 by Rowan Voss RN  Outcome: Progressing  Goal: Absence of Hospital-Acquired Illness or Injury  8/2/2024 1811 by Rowan Voss RN  Outcome: Progressing  8/2/2024 1811 by Rowan Voss RN  Outcome: Progressing  Goal: Optimal Comfort and Wellbeing  8/2/2024 1811 by Rowan Voss RN  Outcome: Progressing  8/2/2024 1811 by Rowan Voss RN  Outcome: Progressing  Goal: Readiness for Transition of Care  8/2/2024 1811 by Rowan Voss RN  Outcome: Progressing  8/2/2024 1811 by Rowan Voss RN  Outcome: Progressing     Problem: Skin Injury Risk Increased  Goal: Skin Health and Integrity  8/2/2024 1811 by Rowan Voss RN  Outcome: Progressing  8/2/2024 1811 by Rowan Voss RN  Outcome: Progressing     Problem: Diarrhea  Goal: Effective Diarrhea Management  Outcome: Progressing

## 2024-08-02 NOTE — ASSESSMENT & PLAN NOTE
Concerned anasarca may represent sequelae of cirrhosis. EGD 8/2021 with Portal hypertensive gastropathy. No varices. No abd pain, fever, chills, or confusion to raise concern for SBP or HE respectively    - Liver doppler  - Aldactone and Lasix for diuresis  - Favor albumin if hypotensive  - LVP   -Initial SBP Gram stain negative for WBC making SBP less likely  -studies on ascites fluid  leaned slightly toward cardiac origin but some results pointed more towards the liver; Liver biopsy scheduled to help r/o the liver as the main cause.

## 2024-08-02 NOTE — H&P
Inpatient Radiology Pre-procedure Note    History of Present Illness:  Vicky Alvarado is a 59 y.o. female who is hospitalized with fluid overload. Has cirrhosis of unclear etiology and had outpatient plans for transjugular liver biopsy with pressure measurements for further evaluation however was lost to follow up. IR consulted for transjugular liver biopsy.    Admission H&P reviewed.  Past Medical History:   Diagnosis Date    Anticoagulant long-term use     Arthritis     Atrial fibrillation     cardioversion    Atrial fibrillation with RVR     HAS WATCHMAN IN PLACE NOW    Avascular necrosis     L hand    CHF (congestive heart failure)     Chronic fatigue     Depression     Diabetes mellitus     Encounter for blood transfusion 07/22/2020    Encounter for blood transfusion 03/2022    Fatty liver     GERD (gastroesophageal reflux disease)     Hx of psychiatric care     Hypertension     Iron deficiency anemia 05/07/2022    TIBC 444 with saturated iron 8    Liver disease     Obese     Pre-diabetes 05/07/2022    Psychiatric problem     Sleep apnea     wears cpap    SOB (shortness of breath) on exertion     Weight loss     75lb intentional weight loss     Past Surgical History:   Procedure Laterality Date    ABLATION OF ARRHYTHMOGENIC FOCUS FOR ATRIAL FIBRILLATION N/A 07/20/2020    Procedure: Ablation atrial fibrillation;  Surgeon: Gabriel Hawley MD;  Location: [facility] EP LAB;  Service: Cardiology;  Laterality: N/A;  afib, PVI, RFA, REENA, SHADY, anes, MB, 3 Prep    ABLATION OF ARRHYTHMOGENIC FOCUS FOR ATRIAL FIBRILLATION N/A 01/24/2022    Procedure: Ablation atrial fibrillation;  Surgeon: Gabriel Hawley MD;  Location: [facility] EP LAB;  Service: Cardiology;  Laterality: N/A;  afib/afl, PVI (re-do)/CTI, RFA, REENA (cx if SR), SHADY, anes, MB, 3 Prep    APPLICATION OF WOUND VACUUM-ASSISTED CLOSURE DEVICE Left 08/03/2020    Procedure: APPLICATION, WOUND VAC;  Surgeon: SEMAJ Márquez III, MD;  Location: [facility] OR [address] [place] [address];   Service: Peripheral Vascular;  Laterality: Left;    APPLICATION OF WOUND VACUUM-ASSISTED CLOSURE DEVICE Left 08/06/2020    Procedure: APPLICATION, WOUND VAC;  Surgeon: SEMAJ Márquez III, MD;  Location: [facility] OR 2ND FLR;  Service: Peripheral Vascular;  Laterality: Left;    CARPAL TUNNEL RELEASE Right 06/10/2020    Procedure: RELEASE, CARPAL TUNNEL - RIGHT;  Surgeon: Adelaida Hall MD;  Location: [facility] [place] OR;  Service: Orthopedics;  Laterality: Right;  GENERAL AND REGIONAL    CARPAL TUNNEL RELEASE Left 05/05/2021    Procedure: RELEASE, CARPAL TUNNEL, LEFT;  Surgeon: Adelaida Hall MD;  Location: [facility] [place] OR;  Service: Orthopedics;  Laterality: Left;  GENERAL & REGIONAL IN PACU    CARPECTOMY Left 9/6/2023    Procedure: CARPECTOMY;  Surgeon: Adelaida Hall MD;  Location: [facility] [place] OR;  Service: Orthopedics;  Laterality: Left;  MAC/REGIONAL  LEFT PROXIMAL ROW    CLOSURE OF WOUND Left 08/06/2020    Procedure: CLOSURE, WOUND;  Surgeon: SEMAJ Márquez III, MD;  Location: [facility] OR 2ND FLR;  Service: Peripheral Vascular;  Laterality: Left;  Complex    COLONOSCOPY N/A 08/17/2021    Procedure: COLONOSCOPY Suprep;  Surgeon: Loco Deluca MD;  Location: [facility];  Service: Endoscopy;  Laterality: N/A;    ECHOCARDIOGRAM,TRANSESOPHAGEAL N/A 07/24/2023    Procedure: Transesophageal echo (REENA) intra-procedure log documentation;  Surgeon: [facility] Provider;  Location: [facility] EP LAB;  Service: Cardiology;  Laterality: N/A;  s/p WM, REENA, anes, 3 Prep    ESOPHAGOGASTRODUODENOSCOPY N/A 08/17/2021    Procedure: EGD (ESOPHAGOGASTRODUODENOSCOPY);  Surgeon: Loco Deluca MD;  Location: [facility];  Service: Endoscopy;  Laterality: N/A;  Patient is schedule to have her Covid test on 08/14/2021 at the [facility] @ 9:30am. [place].    EXPLORATION OF FEMORAL ARTERY Left 07/21/2020    Procedure: EXPLORATION, ARTERY, FEMORAL;  Surgeon: SEMAJ Márquez III, MD;  Location: [facility] OR [facility] FLR;  Service: Peripheral  Vascular;  Laterality: Left;  1. Urgent Direct repair, L SFA branch laceration    FOOT SURGERY      HYSTERECTOMY      HYSTEROSCOPY WITH DILATION AND CURETTAGE OF UTERUS N/A 02/19/2022    Procedure: HYSTEROSCOPY, WITH DILATION AND CURETTAGE OF UTERUS;  Surgeon: Shane Palomo MD;  Location: [facility] OR [address] [place] [address];  Service: OB/GYN;  Laterality: N/A;    INCISION AND DRAINAGE OF KNEE Left 05/12/2022    Procedure: INCISION AND DRAINAGE, Prepatellar bursectomy.;  Surgeon: Dre Guzmán MD;  [address]  Service: Orthopedics;  Laterality: Left;    LEFT HEART CATHETERIZATION Left 02/07/2023    Procedure: Left heart cath;  Surgeon: Josiah Terry MD;  Location: [facility] CATH LAB;  Service: Cardiology;  Laterality: Left;  borderline moderate bleeding risk 6.3%    OCCLUSION OF LEFT ATRIAL APPENDAGE N/A 06/12/2023    Procedure: Left atrial appendage occlusion;  Surgeon: Gabriel Hawley MD;  Location: [facility] EP LAB;  Service: Cardiology;  Laterality: N/A;  afib, watchman, BSCI, demi, fred MB, 3prep    RELEASE OF ULNAR NERVE AT CUBITAL TUNNEL Bilateral     ROBOT-ASSISTED LAPAROSCOPIC ABDOMINAL HYSTERECTOMY USING DA KEIRY XI N/A 08/09/2022    Procedure: XI ROBOTIC HYSTERECTOMY;  Surgeon: Yolanda Barriga MD;  Location: [facility];  Service: OB/GYN;  Laterality: N/A;  dual console requested    ROBOT-ASSISTED LAPAROSCOPIC SALPINGO-OOPHORECTOMY Bilateral 08/09/2022    Procedure: ROBOTIC SALPINGO-OOPHORECTOMY;  Surgeon: Yolanda Barriga MD;  Location: [facility];  Service: OB/GYN;  Laterality: Bilateral;    TRANSESOPHAGEAL ECHOCARDIOGRAPHY N/A 06/12/2023    Procedure: ECHOCARDIOGRAM, TRANSESOPHAGEAL;  Surgeon: Cyril Mast MD;  Location: [facility] EP LAB;  Service: Cardiology;  Laterality: N/A;    TREATMENT OF CARDIAC ARRHYTHMIA N/A 01/29/2020    Procedure: CARDIOVERSION;  Surgeon: Gabriel Hawley MD;  Location: [facility] EP LAB;  Service: Cardiology;  Laterality: N/A;  af, demi, dccv, fred, mb, 345    TREATMENT OF CARDIAC  ARRHYTHMIA  01/24/2022    Procedure: Cardioversion or Defibrillation;  Surgeon: Gabriel Hawley MD;  Location: [facility] EP LAB;  Service: Cardiology;;    WOUND DEBRIDEMENT Left 08/06/2020    Procedure: DEBRIDEMENT, WOUND;  Surgeon: SEMAJ Márquez III, MD;  Location: [facility] OR [address] [place] [address];  Service: Peripheral Vascular;  Laterality: Left;       Review of Systems:   As documented in primary team H&P    Home Meds:   Prior to Admission medications    Medication Sig Start Date End Date Taking? Authorizing Provider   acetaminophen (TYLENOL) 500 MG tablet Take 2 tablets (1,000 mg total) by mouth 2 (two) times daily as needed for Pain. Begin post op day 3.  Patient taking differently: Take 1,000 mg by mouth every evening. Begin post op day 3. 9/5/23  Yes Echo Aguirre PA-C   albuterol (VENTOLIN HFA) 90 mcg/actuation inhaler Inhale 2 puffs into the lungs every 6 (six) hours as needed for Wheezing. Rescue  Patient taking differently: Inhale 2 puffs into the lungs daily as needed for Wheezing or Shortness of Breath. Rescue 12/14/23 12/13/24 Yes Gordy Cordero MD   ALPRAZolam (XANAX) 0.5 MG tablet Take 1 tablet (0.5 mg total) by mouth 2 (two) times daily as needed for Anxiety.  Patient taking differently: Take 0.5 mg by mouth daily as needed for Anxiety. 2/8/24  Yes Gordy Cordero MD   aspirin (ECOTRIN) 81 MG EC tablet Take 81 mg by mouth once daily.   Yes Provider, Historical   atorvastatin (LIPITOR) 80 MG tablet Take 1 tablet (80 mg total) by mouth once daily.  Patient taking differently: Take 80 mg by mouth every evening. 2/7/24 2/6/25 Yes Gordy Cordero MD   b complex vitamins capsule Take 1 capsule by mouth every other day.   Yes Provider, Historical   colchicine (COLCRYS) 0.6 mg tablet For gout attack: Take TWO tablets by mouth, then, 2 hours later, take ONE additional tablet. Then take 1 tablet twice daily only if still needed for gout pain.  Patient taking differently: Take 0.6 mg by mouth daily as needed (For gout  attack: Take TWO tablets by mouth, then, 2 hours later, take ONE additional tablet. Then take 1 tablet twice daily only if still needed for gout pain.). 12/13/23  Yes Gordy Cordero MD   DULoxetine (CYMBALTA) 60 MG capsule Take 2 capsules (120 mg total) by mouth once daily. 11/3/23  Yes Gordy Cordero MD   lisinopriL (PRINIVIL,ZESTRIL) 40 MG tablet Take 1 tablet (40 mg total) by mouth once daily. 6/27/24 6/27/25 Yes Gordy Cordero MD   metoprolol succinate (TOPROL-XL) 100 MG 24 hr tablet Take 1 tablet (100 mg total) by mouth 2 (two) times daily. 1/17/24  Yes Marah Hunter NP   multivitamin (THERAGRAN) per tablet Take 1 tablet by mouth once daily.   Yes Provider, Historical   NIFEdipine (PROCARDIA-XL) 60 MG (OSM) 24 hr tablet Take 1 tablet (60 mg total) by mouth once daily. 7/25/24 2/20/25 Yes Gordy Cordero MD   omeprazole (PRILOSEC) 20 MG capsule TAKE 1 CAPSULE BY MOUTH ONCE DAILY  Patient taking differently: Take 20 mg by mouth daily as needed (hearturn/ indegestion). 7/25/24  Yes Gordy Cordero MD   medroxyPROGESTERone (PROVERA) 10 MG tablet Take 2 tablets (20 mg total) by mouth 3 (three) times daily. 7/13/22 8/9/22  Yolanda Barriga MD   pantoprazole (PROTONIX) 40 MG tablet Take 1 tablet (40 mg total) by mouth once daily.  Patient not taking: Reported on 2/18/2022 1/25/22 2/27/22  Geovanna Garcia MD     Scheduled Meds:    aspirin  81 mg Oral Daily    atorvastatin  80 mg Oral Daily    DULoxetine  60 mg Oral Daily    furosemide (LASIX) injection  80 mg Intravenous Q12H    heparin (porcine)  7,500 Units Subcutaneous Q8H    melatonin  3 mg Oral QHS    metoprolol succinate  100 mg Oral BID    spironolactone  50 mg Oral Daily     Continuous Infusions:   PRN Meds:  Current Facility-Administered Medications:     acetaminophen, 650 mg, Oral, Q4H PRN    albuterol-ipratropium, 3 mL, Nebulization, Q4H PRN    ALPRAZolam, 0.25 mg, Oral, Daily PRN    aluminum-magnesium hydroxide-simethicone, 30  mL, Oral, QID PRN    dextrose 10%, 12.5 g, Intravenous, PRN    dextrose 10%, 25 g, Intravenous, PRN    diclofenac sodium, 2 g, Topical (Top), TID PRN    glucagon (human recombinant), 1 mg, Intramuscular, PRN    glucose, 16 g, Oral, PRN    glucose, 24 g, Oral, PRN    naloxone, 0.02 mg, Intravenous, PRN    ondansetron, 8 mg, Oral, Q8H PRN    prochlorperazine, 5 mg, Intravenous, Q6H PRN    simethicone, 1 tablet, Oral, QID PRN    sodium chloride 0.9%, 10 mL, Intravenous, Q12H PRN  Anticoagulants/Antiplatelets: aspirin    Allergies:   Review of patient's allergies indicates:   Allergen Reactions    Flecainide Shortness Of Breath and Swelling    Shellfish containing products Other (See Comments)     Makes gout terrible    Vancomycin analogues Hives, Itching and Rash     Sedation Hx: have not been any systemic reactions    Labs:  Recent Labs   Lab 07/27/24  0435   INR 1.1       Recent Labs   Lab 08/02/24  0248   WBC 9.44   HGB 9.5*   HCT 30.4*   MCV 90         Recent Labs   Lab 08/02/24  0248         K 3.8   CL 92*   CO2 35*   BUN 18   CREATININE 0.9   CALCIUM 9.1   MG 1.7   ALT 5*   AST 18   ALBUMIN 2.2*  2.2*   BILITOT 1.2*   BILIDIR 0.8*         Vitals:  Temp: 98.2 °F (36.8 °C) (08/02/24 1200)  Pulse: 84 (08/02/24 1200)  Resp: 18 (08/02/24 1200)  BP: 124/71 (08/02/24 1200)  SpO2: 97 % (08/02/24 1200)     Physical Exam:  ASA: 2  Mallampati: 3    General: no acute distress  Mental Status: alert and oriented to person, place and time  HEENT: normocephalic, atraumatic  Chest: unlabored breathing  Abdomen: nondistended  Extremity: moves all extremities    Plan: Transjugular liver biopsy.   - Patient has not been NPO so plan for fentanyl and local anesthetic only. Discussed with patient who is agreeable.     - There is a chance the procedure will be moved to 8/3/24 - if that is the case please make NPO at midnight and will proceed with moderate sedation.    Above discussed with patient and primary  team.    Alec Kent MD  Department of Radiology, PGY-3

## 2024-08-02 NOTE — NURSING
Pt had diarrhea >6 this shift. MD notified. Bath and incontinence care done multiple times in a shift. Pt AAO x3, intermittent confusion. Vitals stable on 2L.  C/o leg pain 7/10, PRNs gave. No acute events this shift. Tele sitter in use. Safety precaution in place.

## 2024-08-02 NOTE — PT/OT/SLP PROGRESS
"Occupational Therapy   Treatment    Name: Vicky Alvarado  MRN: [id number]  Admitting Diagnosis:  Acute on chronic diastolic heart failure       Recommendations:     Discharge Recommendations: Moderate Intensity Therapy  Discharge Equipment Recommendations:  walker, rolling  Barriers to discharge:  None    Assessment:     Vicky Alvarado is a 59 y.o. female with a medical diagnosis of Acute on chronic diastolic heart failure. Pt demonstrated improved bed mobility and standing ability this session but was unable to pivot to the C due to (B) foot pain. Performance deficits affecting function are weakness, impaired endurance, decreased coordination, impaired self care skills, decreased lower extremity function, impaired functional mobility, gait instability, impaired balance, pain, decreased safety awareness.     Rehab Prognosis:  Good; patient would benefit from acute skilled OT services to address these deficits and reach maximum level of function.       Plan:     Patient to be seen 4 x/week to address the above listed problems via self-care/home management, therapeutic activities, therapeutic exercises, neuromuscular re-education  Plan of Care Expires: 08/29/24  Plan of Care Reviewed with: patient, caregiver    Subjective     Patient/Family Comments/goals: "I'm disappointed in myself."    Pain/Comfort:  Pain Rating 1: 0/10    Objective:     Communicated with: rn prior to session.  Patient found supine with peripheral IV, oxygen, pulse ox (continuous), telemetry upon OT entry to room.    General Precautions: Standard, fall    Orthopedic Precautions:N/A  Braces: N/A  Respiratory Status: Nasal cannula, flow 2 L/min     Occupational Performance:     Bed Mobility:    Patient completed Scooting/Bridging with moderate assistance  Patient completed Supine to Sit with minimum assistance  Patient completed Sit to Supine with maximal assistance     Functional Mobility/Transfers:  Patient completed Sit <> Stand " Transfer with moderate assistance and of 2 persons  with  rolling walker   Functional Mobility: Attempted BSC t/f but pt aborted due to pain and unable to pivot.    Activities of Daily Living:  Grooming: stand by assistance sitting EOB.  Upper Body Dressing: moderate assistance   Lower Body Dressing: total assistance     AMPAC 6 Click ADL: 16    Treatment & Education:  EOB balance = SBA  From EOB, pt completed BUE therex (x 10-15 reps each) including:  - lat rows  - bicep curls  - straight arm raises    **discussed OT POC and progress.    Patient left supine with all lines intact and call button in reach    GOALS:   Multidisciplinary Problems       Occupational Therapy Goals          Problem: Occupational Therapy    Goal Priority Disciplines Outcome Interventions   Occupational Therapy Goal     OT, PT/OT Progressing    Description: Goals to be met by: 8/5/2024     Patient will increase functional independence with ADLs by performing:    UE Dressing with Set-up Assistance.  LE Dressing with Minimal Assistance.  Grooming while standing at sink with Supervision.  Toileting from toilet with Supervision for hygiene and clothing management.   Toilet transfer to toilet with Supervision.                         Time Tracking:     OT Date of Treatment: 08/02/24  OT Start Time: 1246  OT Stop Time: 1316  OT Total Time (min): 30 min    Billable Minutes:Self Care/Home Management 15  Therapeutic Exercise 15    OT/GASPER: OT          8/2/2024

## 2024-08-02 NOTE — NURSING
Nurses Note -- 4 Eyes      8/2/2024   3:54 PM      Skin assessed during: Daily Assessment      [] No Altered Skin Integrity Present    []Prevention Measures Documented      [x] Yes- Altered Skin Integrity Present or Discovered   [x] LDA Added if Not in Epic (Describe Wound)   [x] New Altered Skin Integrity was Present on Admit and Documented in LDA   [x] Wound Image Taken      Wound Care Consulted? Yes    Attending Nurse:  Rowan ATKINS    Second RN/Staff Member:  Yi WOODRUFF

## 2024-08-03 LAB
ALBUMIN SERPL BCP-MCNC: 2.1 G/DL (ref 3.5–5.2)
ALP SERPL-CCNC: 138 U/L (ref 55–135)
ALT SERPL W/O P-5'-P-CCNC: <5 U/L (ref 10–44)
ANION GAP SERPL CALC-SCNC: 8 MMOL/L (ref 8–16)
AST SERPL-CCNC: 26 U/L (ref 10–40)
BASOPHILS # BLD AUTO: 0.02 K/UL (ref 0–0.2)
BASOPHILS NFR BLD: 0.3 % (ref 0–1.9)
BILIRUB SERPL-MCNC: 1.1 MG/DL (ref 0.1–1)
BUN SERPL-MCNC: 23 MG/DL (ref 6–20)
CALCIUM SERPL-MCNC: 9.1 MG/DL (ref 8.7–10.5)
CHLORIDE SERPL-SCNC: 93 MMOL/L (ref 95–110)
CO2 SERPL-SCNC: 38 MMOL/L (ref 23–29)
CREAT SERPL-MCNC: 0.9 MG/DL (ref 0.5–1.4)
DIFFERENTIAL METHOD BLD: ABNORMAL
EOSINOPHIL # BLD AUTO: 0.2 K/UL (ref 0–0.5)
EOSINOPHIL NFR BLD: 3 % (ref 0–8)
ERYTHROCYTE [DISTWIDTH] IN BLOOD BY AUTOMATED COUNT: 14.2 % (ref 11.5–14.5)
EST. GFR  (NO RACE VARIABLE): >60 ML/MIN/1.73 M^2
GLUCOSE SERPL-MCNC: 93 MG/DL (ref 70–110)
HCT VFR BLD AUTO: 32.6 % (ref 37–48.5)
HGB BLD-MCNC: 10 G/DL (ref 12–16)
IMM GRANULOCYTES # BLD AUTO: 0.04 K/UL (ref 0–0.04)
IMM GRANULOCYTES NFR BLD AUTO: 0.5 % (ref 0–0.5)
LYMPHOCYTES # BLD AUTO: 0.9 K/UL (ref 1–4.8)
LYMPHOCYTES NFR BLD: 11.4 % (ref 18–48)
MAGNESIUM SERPL-MCNC: 2 MG/DL (ref 1.6–2.6)
MCH RBC QN AUTO: 27.9 PG (ref 27–31)
MCHC RBC AUTO-ENTMCNC: 30.7 G/DL (ref 32–36)
MCV RBC AUTO: 91 FL (ref 82–98)
MONOCYTES # BLD AUTO: 0.7 K/UL (ref 0.3–1)
MONOCYTES NFR BLD: 9.4 % (ref 4–15)
NEUTROPHILS # BLD AUTO: 5.7 K/UL (ref 1.8–7.7)
NEUTROPHILS NFR BLD: 75.4 % (ref 38–73)
NRBC BLD-RTO: 0 /100 WBC
PLATELET # BLD AUTO: 184 K/UL (ref 150–450)
PMV BLD AUTO: 10.3 FL (ref 9.2–12.9)
POCT GLUCOSE: 85 MG/DL (ref 70–110)
POTASSIUM SERPL-SCNC: 4.1 MMOL/L (ref 3.5–5.1)
PROT SERPL-MCNC: 6.2 G/DL (ref 6–8.4)
RBC # BLD AUTO: 3.58 M/UL (ref 4–5.4)
SODIUM SERPL-SCNC: 139 MMOL/L (ref 136–145)
WBC # BLD AUTO: 7.55 K/UL (ref 3.9–12.7)

## 2024-08-03 PROCEDURE — 85025 COMPLETE CBC W/AUTO DIFF WBC: CPT

## 2024-08-03 PROCEDURE — 63600175 PHARM REV CODE 636 W HCPCS

## 2024-08-03 PROCEDURE — 94660 CPAP INITIATION&MGMT: CPT

## 2024-08-03 PROCEDURE — 99900035 HC TECH TIME PER 15 MIN (STAT)

## 2024-08-03 PROCEDURE — 80053 COMPREHEN METABOLIC PANEL: CPT | Performed by: STUDENT IN AN ORGANIZED HEALTH CARE EDUCATION/TRAINING PROGRAM

## 2024-08-03 PROCEDURE — 94761 N-INVAS EAR/PLS OXIMETRY MLT: CPT

## 2024-08-03 PROCEDURE — 25000003 PHARM REV CODE 250

## 2024-08-03 PROCEDURE — 36415 COLL VENOUS BLD VENIPUNCTURE: CPT | Performed by: STUDENT IN AN ORGANIZED HEALTH CARE EDUCATION/TRAINING PROGRAM

## 2024-08-03 PROCEDURE — 99232 SBSQ HOSP IP/OBS MODERATE 35: CPT | Mod: ,,, | Performed by: INTERNAL MEDICINE

## 2024-08-03 PROCEDURE — 83735 ASSAY OF MAGNESIUM: CPT

## 2024-08-03 PROCEDURE — 20600001 HC STEP DOWN PRIVATE ROOM

## 2024-08-03 RX ORDER — PANTOPRAZOLE SODIUM 40 MG/1
40 TABLET, DELAYED RELEASE ORAL DAILY
Status: DISCONTINUED | OUTPATIENT
Start: 2024-08-03 | End: 2024-08-13 | Stop reason: HOSPADM

## 2024-08-03 RX ORDER — SODIUM CHLORIDE 0.9 % (FLUSH) 0.9 %
10 SYRINGE (ML) INJECTION
Status: DISCONTINUED | OUTPATIENT
Start: 2024-08-03 | End: 2024-08-13 | Stop reason: HOSPADM

## 2024-08-03 RX ORDER — PANTOPRAZOLE SODIUM 40 MG/10ML
40 INJECTION, POWDER, LYOPHILIZED, FOR SOLUTION INTRAVENOUS DAILY
Status: DISCONTINUED | OUTPATIENT
Start: 2024-08-03 | End: 2024-08-03

## 2024-08-03 RX ADMIN — ONDANSETRON 8 MG: 8 TABLET, ORALLY DISINTEGRATING ORAL at 10:08

## 2024-08-03 RX ADMIN — HEPARIN SODIUM 7500 UNITS: 5000 INJECTION INTRAVENOUS; SUBCUTANEOUS at 06:08

## 2024-08-03 RX ADMIN — ATORVASTATIN CALCIUM 80 MG: 40 TABLET, FILM COATED ORAL at 09:08

## 2024-08-03 RX ADMIN — Medication 3 MG: at 08:08

## 2024-08-03 RX ADMIN — HEPARIN SODIUM 7500 UNITS: 5000 INJECTION INTRAVENOUS; SUBCUTANEOUS at 02:08

## 2024-08-03 RX ADMIN — HEPARIN SODIUM 7500 UNITS: 5000 INJECTION INTRAVENOUS; SUBCUTANEOUS at 09:08

## 2024-08-03 RX ADMIN — METOPROLOL SUCCINATE 100 MG: 100 TABLET, EXTENDED RELEASE ORAL at 09:08

## 2024-08-03 RX ADMIN — PANTOPRAZOLE SODIUM 40 MG: 40 INJECTION, POWDER, FOR SOLUTION INTRAVENOUS at 10:08

## 2024-08-03 RX ADMIN — ALPRAZOLAM 0.25 MG: 0.25 TABLET ORAL at 09:08

## 2024-08-03 RX ADMIN — ALUMINUM HYDROXIDE, MAGNESIUM HYDROXIDE, AND SIMETHICONE 30 ML: 1200; 120; 1200 SUSPENSION ORAL at 08:08

## 2024-08-03 RX ADMIN — SPIRONOLACTONE 50 MG: 25 TABLET ORAL at 09:08

## 2024-08-03 RX ADMIN — METOPROLOL SUCCINATE 100 MG: 100 TABLET, EXTENDED RELEASE ORAL at 08:08

## 2024-08-03 RX ADMIN — DULOXETINE HYDROCHLORIDE 60 MG: 30 CAPSULE, DELAYED RELEASE ORAL at 09:08

## 2024-08-03 RX ADMIN — FUROSEMIDE 80 MG: 10 INJECTION, SOLUTION INTRAVENOUS at 09:08

## 2024-08-03 RX ADMIN — ALPRAZOLAM 0.25 MG: 0.25 TABLET ORAL at 08:08

## 2024-08-03 RX ADMIN — FUROSEMIDE 80 MG: 10 INJECTION, SOLUTION INTRAVENOUS at 08:08

## 2024-08-03 RX ADMIN — ACETAMINOPHEN 650 MG: 325 TABLET ORAL at 09:08

## 2024-08-03 RX ADMIN — PROCHLORPERAZINE EDISYLATE 5 MG: 5 INJECTION INTRAMUSCULAR; INTRAVENOUS at 03:08

## 2024-08-03 RX ADMIN — ASPIRIN 81 MG: 81 TABLET, COATED ORAL at 09:08

## 2024-08-03 RX ADMIN — ACETAMINOPHEN 650 MG: 325 TABLET ORAL at 02:08

## 2024-08-03 NOTE — H&P (VIEW-ONLY)
[facility] ([place] [id number])  Cardiology  Consult Note    Patient Name: Vicky Alvarado  MRN: [id number]  Admission Date: 7/26/2024  Hospital Length of Stay: 7 days  Code Status: Full Code   Attending Provider: Ramon Toure DO   Consulting Provider: Krystyna Livingston DO  Primary Care Physician: Gordy Cordero MD  Principal Problem:Acute on chronic diastolic heart failure    Patient information was obtained from patient, past medical records, ER records, and primary team.     Inpatient consult to Cardiology  Consult performed by: Krystyna Livingston DO  Consult ordered by: Ramon Toure DO        Subjective:     HPI:   Patient is a 59F with CHF (EF 60%, G2DD), HTN, Afib (s/p watchman procedure placed in 5/2023), T2DM presented to the ED for frequent falls and progressively worsening SOB. Notes that SOB for the past ~6 months has mostly been with exertion,  though now it is with rest as well. Reports orthopnea during this time frame. States she does not have any pain with breathing, but does additionally have occasional, intermittent chest pressure/discomfort, as well as bilateral LE edema. States symptoms were noticeably worse the last week prior to presenting to [facility] ED this admission.     In the ED, pt received a paracentesis with removal of 7L. Ascitic fluid shows no evidence of SBP at this time. Abdominal CT shows a cirrhotic morphology of the liver. US/doppler of  liver shows evidence of cirrhosis, splenotmegally, and portal hypertension. Pt w/o signs of jaundice. Denies pruritus. Her liver enzymes, Alk Phos, and bilirubin are wnl. Protein studies of the ascitic fluid show increased protein leaning more towards a cardiac etiology.  Initial SAAG was 1.0 with total ascitic protein of 3.2. Repeat paracentesis with removal of 10L on 7/30 had SAAG of 1.5 with total ascitic protein of 2.9. CXR shows heart to be borderline enlarged and some perihilar infiltrate. ECHO on (7/29/24) showed intermediate  diastolic dysfunction with EF 60-65%, minor aortic stenosis, and moderate pulmonary hypertension with estimated pulmonary artery systolic pressure is 54 mmHg. Repeat paracentesis performed (7/30/24) removed an additional 9.8L. Cardiology consulted for overload and concern for cardiogenic etiology related to right heart failure.     Past Medical History:   Diagnosis Date    Anticoagulant long-term use     Arthritis     Atrial fibrillation     cardioversion    Atrial fibrillation with RVR     HAS WATCHMAN IN PLACE NOW    Avascular necrosis     L hand    CHF (congestive heart failure)     Chronic fatigue     Depression     Diabetes mellitus     Encounter for blood transfusion 07/22/2020    Encounter for blood transfusion 03/2022    Fatty liver     GERD (gastroesophageal reflux disease)     Hx of psychiatric care     Hypertension     Iron deficiency anemia 05/07/2022    TIBC 444 with saturated iron 8    Liver disease     Obese     Pre-diabetes 05/07/2022    Psychiatric problem     Sleep apnea     wears cpap    SOB (shortness of breath) on exertion     Weight loss     75lb intentional weight loss       Past Surgical History:   Procedure Laterality Date    ABLATION OF ARRHYTHMOGENIC FOCUS FOR ATRIAL FIBRILLATION N/A 07/20/2020    Procedure: Ablation atrial fibrillation;  Surgeon: Gabriel Hawley MD;  Location: [facility] EP LAB;  Service: Cardiology;  Laterality: N/A;  afib, PVI, RFA, REENA, SHADY, anes, MB, 3 Prep    ABLATION OF ARRHYTHMOGENIC FOCUS FOR ATRIAL FIBRILLATION N/A 01/24/2022    Procedure: Ablation atrial fibrillation;  Surgeon: Gabriel Hawley MD;  Location: [facility] EP LAB;  Service: Cardiology;  Laterality: N/A;  afib/afl, PVI (re-do)/CTI, RFA, REENA (cx if SR), SHADY, anes, MB, 3 Prep    APPLICATION OF WOUND VACUUM-ASSISTED CLOSURE DEVICE Left 08/03/2020    Procedure: APPLICATION, WOUND VAC;  Surgeon: SEMAJ Márquez III, MD;  Location: [facility] OR [address] [place] [address];  Service: Peripheral Vascular;  Laterality: Left;     APPLICATION OF WOUND VACUUM-ASSISTED CLOSURE DEVICE Left 08/06/2020    Procedure: APPLICATION, WOUND VAC;  Surgeon: SEMAJ Márquez III, MD;  Location: [facility] OR 2ND FLR;  Service: Peripheral Vascular;  Laterality: Left;    CARPAL TUNNEL RELEASE Right 06/10/2020    Procedure: RELEASE, CARPAL TUNNEL - RIGHT;  Surgeon: Adelaida Hall MD;  Location: [facility] OR;  Service: Orthopedics;  Laterality: Right;  GENERAL AND REGIONAL    CARPAL TUNNEL RELEASE Left 05/05/2021    Procedure: RELEASE, CARPAL TUNNEL, LEFT;  Surgeon: Adelaida Hall MD;  Location: [facility] OR;  Service: Orthopedics;  Laterality: Left;  GENERAL & REGIONAL IN PACU    CARPECTOMY Left 9/6/2023    Procedure: CARPECTOMY;  Surgeon: Adelaida Hall MD;  Location: [facility] OR;  Service: Orthopedics;  Laterality: Left;  MAC/REGIONAL  LEFT PROXIMAL ROW    CLOSURE OF WOUND Left 08/06/2020    Procedure: CLOSURE, WOUND;  Surgeon: SEMAJ Márquez III, MD;  [facility];  Service: Peripheral Vascular;  Laterality: Left;  Complex    COLONOSCOPY N/A 08/17/2021    Procedure: COLONOSCOPY Suprep;  Surgeon: Loco Deluca MD;  Location: [facility];  Service: Endoscopy;  Laterality: N/A;    ECHOCARDIOGRAM,TRANSESOPHAGEAL N/A 07/24/2023    Procedure: Transesophageal echo (REENA) intra-procedure log documentation;  Surgeon: Leyla Diagnostic Provider;  Location: [facility] EP LAB;  Service: Cardiology;  Laterality: N/A;  s/p WM, REENA, anes, 3 Prep    ESOPHAGOGASTRODUODENOSCOPY N/A 08/17/2021    Procedure: EGD (ESOPHAGOGASTRODUODENOSCOPY);  Surgeon: Loco Deluca MD;  Location: [facility] ENDO;  Service: Endoscopy;  Laterality: N/A;  Patient is schedule to have her Covid test on 08/14/2021 at the [facility] @ 9:30am. [place].    EXPLORATION OF FEMORAL ARTERY Left 07/21/2020    Procedure: EXPLORATION, ARTERY, FEMORAL;  Surgeon: SEMAJ Márquez III, MD;  Location: [facility] OR 2ND FLR;  Service: Peripheral Vascular;  Laterality: Left;  1. Urgent Direct repair,  L SFA branch laceration    FOOT SURGERY      HYSTERECTOMY      HYSTEROSCOPY WITH DILATION AND CURETTAGE OF UTERUS N/A 02/19/2022    Procedure: HYSTEROSCOPY, WITH DILATION AND CURETTAGE OF UTERUS;  Surgeon: Shane Palomo MD;  Location: [address];  Service: OB/GYN;  Laterality: N/A;    INCISION AND DRAINAGE OF KNEE Left 05/12/2022    Procedure: INCISION AND DRAINAGE, Prepatellar bursectomy.;  Surgeon: Dre Guzmán MD;  [address];  Service: Orthopedics;  Laterality: Left;    LEFT HEART CATHETERIZATION Left 02/07/2023    Procedure: Left heart cath;  Surgeon: Josiah Terry MD;  Location: [facility] CATH LAB;  Service: Cardiology;  Laterality: Left;  borderline moderate bleeding risk 6.3%    OCCLUSION OF LEFT ATRIAL APPENDAGE N/A 06/12/2023    Procedure: Left atrial appendage occlusion;  Surgeon: Gabriel Hawley MD;  Location: [facility] EP LAB;  Service: Cardiology;  Laterality: N/A;  afib, watchman, BSCI, demi, fred, MB, 3prep    RELEASE OF ULNAR NERVE AT CUBITAL TUNNEL Bilateral     ROBOT-ASSISTED LAPAROSCOPIC ABDOMINAL HYSTERECTOMY USING DA KEIRY XI N/A 08/09/2022    Procedure: XI ROBOTIC HYSTERECTOMY;  Surgeon: Yolanda Barriga MD;  Location: [facility];  Service: OB/GYN;  Laterality: N/A;  dual console requested    ROBOT-ASSISTED LAPAROSCOPIC SALPINGO-OOPHORECTOMY Bilateral 08/09/2022    Procedure: ROBOTIC SALPINGO-OOPHORECTOMY;  Surgeon: Yolanda Barriga MD;  Location: [facility];  Service: OB/GYN;  Laterality: Bilateral;    TRANSESOPHAGEAL ECHOCARDIOGRAPHY N/A 06/12/2023    Procedure: ECHOCARDIOGRAM, TRANSESOPHAGEAL;  Surgeon: Cyril Mast MD;  Location: [facility] EP LAB;  Service: Cardiology;  Laterality: N/A;    TREATMENT OF CARDIAC ARRHYTHMIA N/A 01/29/2020    Procedure: CARDIOVERSION;  Surgeon: Gabriel Hawley MD;  Location: [facility] EP LAB;  Service: Cardiology;  Laterality: N/A;  af, demi, dccv, anes, mb, 345    TREATMENT OF CARDIAC ARRHYTHMIA  01/24/2022    Procedure: Cardioversion or  Defibrillation;  Surgeon: Gabriel Hawley MD;  Location: [facility] EP LAB;  Service: Cardiology;;    WOUND DEBRIDEMENT Left 08/06/2020    Procedure: DEBRIDEMENT, WOUND;  Surgeon: SEMAJ Márquez III, MD;  Location: [facility] OR [address] [place] [address];  Service: Peripheral Vascular;  Laterality: Left;       Review of patient's allergies indicates:   Allergen Reactions    Flecainide Shortness Of Breath and Swelling    Shellfish containing products Other (See Comments)     Makes gout terrible    Vancomycin analogues Hives, Itching and Rash       Current Facility-Administered Medications on File Prior to Encounter   Medication    mupirocin 2 % ointment     Current Outpatient Medications on File Prior to Encounter   Medication Sig    acetaminophen (TYLENOL) 500 MG tablet Take 2 tablets (1,000 mg total) by mouth 2 (two) times daily as needed for Pain. Begin post op day 3. (Patient taking differently: Take 1,000 mg by mouth every evening. Begin post op day 3.)    albuterol (VENTOLIN HFA) 90 mcg/actuation inhaler Inhale 2 puffs into the lungs every 6 (six) hours as needed for Wheezing. Rescue (Patient taking differently: Inhale 2 puffs into the lungs daily as needed for Wheezing or Shortness of Breath. Rescue)    ALPRAZolam (XANAX) 0.5 MG tablet Take 1 tablet (0.5 mg total) by mouth 2 (two) times daily as needed for Anxiety. (Patient taking differently: Take 0.5 mg by mouth daily as needed for Anxiety.)    aspirin (ECOTRIN) 81 MG EC tablet Take 81 mg by mouth once daily.    atorvastatin (LIPITOR) 80 MG tablet Take 1 tablet (80 mg total) by mouth once daily. (Patient taking differently: Take 80 mg by mouth every evening.)    b complex vitamins capsule Take 1 capsule by mouth every other day.    colchicine (COLCRYS) 0.6 mg tablet For gout attack: Take TWO tablets by mouth, then, 2 hours later, take ONE additional tablet. Then take 1 tablet twice daily only if still needed for gout pain. (Patient taking differently: Take 0.6 mg by mouth daily as  needed (For gout attack: Take TWO tablets by mouth, then, 2 hours later, take ONE additional tablet. Then take 1 tablet twice daily only if still needed for gout pain.).)    DULoxetine (CYMBALTA) 60 MG capsule Take 2 capsules (120 mg total) by mouth once daily.    lisinopriL (PRINIVIL,ZESTRIL) 40 MG tablet Take 1 tablet (40 mg total) by mouth once daily.    metoprolol succinate (TOPROL-XL) 100 MG 24 hr tablet Take 1 tablet (100 mg total) by mouth 2 (two) times daily.    multivitamin (THERAGRAN) per tablet Take 1 tablet by mouth once daily.    NIFEdipine (PROCARDIA-XL) 60 MG (OSM) 24 hr tablet Take 1 tablet (60 mg total) by mouth once daily.    omeprazole (PRILOSEC) 20 MG capsule TAKE 1 CAPSULE BY MOUTH ONCE DAILY (Patient taking differently: Take 20 mg by mouth daily as needed (hearturn/ indegestion).)    [DISCONTINUED] medroxyPROGESTERone (PROVERA) 10 MG tablet Take 2 tablets (20 mg total) by mouth 3 (three) times daily.    [DISCONTINUED] pantoprazole (PROTONIX) 40 MG tablet Take 1 tablet (40 mg total) by mouth once daily. (Patient not taking: Reported on 2022)     Family History       Problem Relation (Age of Onset)    Cataracts Mother    Diabetes Father    Hypertension Mother, Father, Sister, Brother    Thyroid disease Mother          Tobacco Use    Smoking status: Former     Current packs/day: 0.00     Types: Cigarettes     Quit date: 2019     Years since quittin.0    Smokeless tobacco: Never   Substance and Sexual Activity    Alcohol use: No    Drug use: No    Sexual activity: Not Currently     Partners: Male     Birth control/protection: See Surgical Hx     Review of Systems   Constitutional: Positive for weight gain. Negative for chills and fever.   Cardiovascular:  Positive for chest pain, dyspnea on exertion, leg swelling, orthopnea and palpitations.   Respiratory:  Positive for shortness of breath. Negative for cough.    Gastrointestinal:  Negative for nausea and vomiting.   Neurological:   Negative for dizziness and headaches.     Objective:     Vital Signs (Most Recent):  Temp: 98.4 °F (36.9 °C) (08/02/24 2235)  Pulse: 71 (08/03/24 0011)  Resp: 18 (08/03/24 0011)  BP: 105/64 (08/02/24 2235)  SpO2: 96 % (08/03/24 0011) Vital Signs (24h Range):  Temp:  [97.7 °F (36.5 °C)-98.4 °F (36.9 °C)] 98.4 °F (36.9 °C)  Pulse:  [71-96] 71  Resp:  [17-23] 18  SpO2:  [95 %-97 %] 96 %  BP: ()/(62-72) 105/64     Weight: 101.1 kg (222 lb 14.2 oz)  Body mass index is 37.09 kg/m².    SpO2: 96 %       No intake or output data in the 24 hours ending 08/03/24 0057    Lines/Drains/Airways       Drain  Duration             Female External Urinary Catheter w/ Suction 07/27/24 0035 7 days              Peripheral Intravenous Line  Duration                  Peripheral IV - Single Lumen 08/01/24 1053 20 G Distal;Left;Posterior Forearm 1 day         Peripheral IV - Single Lumen 08/01/24 1105 20 G 1 3/4 in Anterior;Left Upper Arm 1 day                     Physical Exam  Constitutional:       General: She is not in acute distress.     Appearance: She is obese.   HENT:      Head: Normocephalic and atraumatic.      Mouth/Throat:      Pharynx: Oropharynx is clear.   Eyes:      Extraocular Movements: Extraocular movements intact.      Pupils: Pupils are equal, round, and reactive to light.   Cardiovascular:      Rate and Rhythm: Normal rate and regular rhythm.      Pulses: Normal pulses.      Heart sounds: Murmur heard.      Gallop present. S4 sounds present.   Pulmonary:      Effort: Pulmonary effort is normal.      Breath sounds: Normal breath sounds.   Abdominal:      General: There is distension.      Palpations: Abdomen is soft.      Tenderness: There is no abdominal tenderness.   Musculoskeletal:         General: Normal range of motion.      Cervical back: Neck supple.      Right lower leg: Edema present.      Left lower leg: Edema present.   Skin:     General: Skin is warm and dry.      Capillary Refill: Capillary refill  "takes less than 2 seconds.   Neurological:      General: No focal deficit present.      Mental Status: She is alert and oriented to person, place, and time.   Psychiatric:         Mood and Affect: Mood normal.         Behavior: Behavior normal.          Significant Labs: CMP   Recent Labs   Lab 08/01/24  0530 08/01/24  1441 08/02/24  0248    140 139   K 2.8* 3.3* 3.8   CL 90* 93* 92*   CO2 39* 37* 35*   GLU 84 97 109   BUN 17 17 18   CREATININE 0.9 0.9 0.9   CALCIUM 9.0 9.1 9.1   PROT  --   --  6.0   ALBUMIN 2.3* 2.2* 2.2*  2.2*   BILITOT  --   --  1.2*   ALKPHOS  --   --  119   AST  --   --  18   ALT  --   --  5*   ANIONGAP 10 10 12    and Troponin No results for input(s): "TROPONINI" in the last 48 hours.    Significant Imaging: Echocardiogram: Transthoracic echo (TTE) complete (Cupid Only):   Results for orders placed or performed during the hospital encounter of 07/26/24   Echo   Result Value Ref Range    BSA 2.31 m2    LVOT stroke volume 78.59 cm3    LVIDd 5.23 3.5 - 6.0 cm    LV Systolic Volume 49.86 mL    LV Systolic Volume Index 22.7 mL/m2    LVIDs 3.47 2.1 - 4.0 cm    LV Diastolic Volume 131.05 mL    LV Diastolic Volume Index 59.57 mL/m2    IVS 0.76 0.6 - 1.1 cm    LVOT diameter 2.0 cm    LVOT area 3.1 cm2    FS 34 28 - 44 %    Left Ventricle Relative Wall Thickness 0.31 cm    Posterior Wall 0.81 0.6 - 1.1 cm    LV mass 143.19 g    LV Mass Index 65 g/m2    MV Peak E Eulalio 1.06 m/s    TDI LATERAL 0.14 m/s    TDI SEPTAL 0.07 m/s    E/E' ratio 10.10 m/s    TR Max Eulalio 3.58 m/s    IVRT 85.63 msec    E wave deceleration time 217.62 msec    LV SEPTAL E/E' RATIO 15.14 m/s    LA Volume Index 52.9 mL/m2    LV LATERAL E/E' RATIO 7.57 m/s    LA volume 116.34 cm3    LVOT peak eulalio 1.10 m/s    RV- trejo basal diam 5.2 cm    TAPSE 3.10 cm    LA size 4.93 cm    Left Atrium Minor Axis 6.11 cm    Left Atrium Major Axis 6.12 cm    LA volume (mod) 92.76 cm3    LA WIDTH 4.54 cm    LA Volume Index (Mod) 42.2 mL/m2    RA Major " Axis 5.2 cm    RA Width 4.3 cm    AV mean gradient 13 mmHg    AV peak gradient 23 mmHg    Ao peak carlee 2.39 m/s    Ao VTI 48.12 cm    LVOT peak VTI 25.03 cm    AV valve area 1.63 cm²    AV Velocity Ratio 0.46     AV index (prosthetic) 0.52     LENCHO by Velocity Ratio 1.45 cm²    MV stenosis pressure 1/2 time 63.11 ms    MV valve area p 1/2 method 3.49 cm2    Triscuspid Valve Regurgitation Peak Gradient 51 mmHg    Sinus 2.44 cm    STJ 2.26 cm    Ascending aorta 2.92 cm    Mean e' 0.11 m/s    ZLVIDS -2.17     ZLVIDD -3.62     TV resting pulmonary artery pressure 54 mmHg    RV TB RVSP 7 mmHg    Est. RA pres 3 mmHg    Narrative      Left Ventricle: The left ventricle is normal in size. Normal wall   thickness. Normal wall motion. There is normal systolic function with a   visually estimated ejection fraction of 60 - 65%. There is indeterminate   diastolic function.    Right Ventricle: Moderate right ventricular enlargement. Systolic   function is borderline low.    Left Atrium: Left atrium is severely dilated.    Right Atrium: Right atrium is dilated. Catheter present in the right   atrium.    Aortic Valve: The aortic valve is a trileaflet valve. There is mild   aortic valve sclerosis. There is mild annular calcification present. There   is mild stenosis. Aortic valve area by VTI is 1.63 cm². Aortic valve peak   velocity is 2.39 m/s. Mean gradient is 13 mmHg. The dimensionless index is   0.52.    Pulmonary Artery: There is moderate pulmonary hypertension. The   estimated pulmonary artery systolic pressure is 54 mmHg.    IVC/SVC: Normal venous pressure at 3 mmHg.    Ascites present.       Assessment and Plan:     Volume overload  Liver US with doppler ordered with evidence of cirrhotic liver morphology with portal HTN, splenomegaly and moderate volume ascites. Last seen in hepatology clinic by Dr. Gutierres in 4/2023 with plans for TJ liver biopsy with pressure measurements, but seems patient was lost to follow up. LFTs and  ALP wnl. Tbili today slightly elevated at 1.2 with direct bili 0.8.     Ascitic fluid studies with initial SAAG was 1.0 with total ascitic protein of 3.2. Repeat paracentesis with removal of 10L on 7/30 had SAAG of 1.5 with total ascitic protein of 2.9. Consistent with exudative fluid, as protein >2.5 g. If overload in setting of heart failure, would expect ascitic fluid to be transudative.    , troponin <0.006. EKG shows RBBB. Has been receiving IV furosemide 80 mg BID. UOP documented at 700 mL, though accuracy of documentation questionable as pt reported frequent urination and significant improvement in peripheral edema with diuresis. Pt did state to feel as if she was in afib all day yesterday. She is currently compliant on metoprolol succinate 100 mg and nifedipine 60 mg for rate control at home. Also s/p watchman placement in 2023. 7/28 TTE with EF 60-65%, moderate RV enlargement, LA severe dilation, CVP 3 mmHg, moderate pulmonary hypertension with pulmonary artery systolic pressure 54 mmHg, and mild AS. S4 heart sound on exam, likely in setting of pulmonary HTN. Given low CVP, not convinced volume overload is d/t heart failure. Recommend right heart catheterization on Monday, 8/5, for better diagnostic evaluation of the heart.        VTE Risk Mitigation (From admission, onward)           Ordered     heparin (porcine) injection 7,500 Units  Every 8 hours         07/29/24 1202     IP VTE HIGH RISK PATIENT  Once         07/27/24 0351     Place sequential compression device  Until discontinued         07/27/24 0351                    Thank you for your consult. I will follow-up with patient. Please contact us if you have any additional questions.    Krystyna Livingston, DO  Cardiology   [facility] ([place])

## 2024-08-03 NOTE — ASSESSMENT & PLAN NOTE
Liver US with doppler ordered with evidence of cirrhotic liver morphology with portal HTN, splenomegaly and moderate volume ascites. Last seen in hepatology clinic by Dr. Gutierres in 4/2023 with plans for TJ liver biopsy with pressure measurements, but seems patient was lost to follow up. LFTs and ALP wnl. Tbili today slightly elevated at 1.2 with direct bili 0.8.     Ascitic fluid studies with initial SAAG was 1.0 with total ascitic protein of 3.2. Repeat paracentesis with removal of 10L on 7/30 had SAAG of 1.5 with total ascitic protein of 2.9. Consistent with exudative fluid, as protein >2.5 g. If overload in setting of heart failure, would expect ascitic fluid to be transudative.    , troponin <0.006. EKG shows RBBB. Has been receiving IV furosemide 80 mg BID. UOP documented at 300 mL, though accuracy of documentation questionable as pt reported frequent urination and significant improvement in peripheral edema with diuresis. Pt did state to feel as if she was in afib all day on 8/1. She is currently compliant on metoprolol succinate 100 mg and nifedipine 60 mg for rate control at home. Also s/p watchman placement in 2023. 7/28 TTE with EF 60-65%, moderate RV enlargement, LA severe dilation, CVP 3 mmHg, moderate pulmonary hypertension with pulmonary artery systolic pressure 54 mmHg, and mild AS. S4 heart sound on exam, likely in setting of pulmonary HTN. Abdominal swelling also notably increased. Lungs CTAB. Given low CVP, not convinced volume overload is d/t heart failure. Recommend right heart catheterization on Monday, 8/5, for better diagnostic evaluation of the heart.

## 2024-08-03 NOTE — ASSESSMENT & PLAN NOTE
Seen by Dr. Saba with hepatology for previous chronic mild elevation in LFT, determined most likely NADLF. Now with cirrhotic morphology on CT. Previous EGD with portal gastropathy. LFT normal on admit. 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     - Liver US with Doppler  - Started on spironolactone 25mg   - Lasix 80 tid   -Lasix has been transitioned to 80 BID  -IR consulted; will perform a diagnostic liver biopsy

## 2024-08-03 NOTE — ASSESSMENT & PLAN NOTE
Liver US with doppler ordered with evidence of cirrhotic liver morphology with portal HTN, splenomegaly and moderate volume ascites. Last seen in hepatology clinic by Dr. Gutierres in 4/2023 with plans for TJ liver biopsy with pressure measurements, but seems patient was lost to follow up. LFTs and ALP wnl. Tbili today slightly elevated at 1.2 with direct bili 0.8.     Ascitic fluid studies with initial SAAG was 1.0 with total ascitic protein of 3.2. Repeat paracentesis with removal of 10L on 7/30 had SAAG of 1.5 with total ascitic protein of 2.9. Consistent with exudative fluid, as protein >2.5 g. If overload in setting of heart failure, would expect ascitic fluid to be transudative.    , troponin <0.006. EKG shows RBBB. Has been receiving IV furosemide 80 mg BID. UOP documented at 700 mL, though accuracy of documentation questionable as pt reported frequent urination and significant improvement in peripheral edema with diuresis. Pt did state to feel as if she was in afib all day yesterday. She is currently compliant on metoprolol succinate 100 mg and nifedipine 60 mg for rate control at home. Also s/p watchman placement in 2023. 7/28 TTE with EF 60-65%, moderate RV enlargement, LA severe dilation, CVP 3 mmHg, moderate pulmonary hypertension with pulmonary artery systolic pressure 54 mmHg, and mild AS. S4 heart sound on exam, likely in setting of pulmonary HTN. Given low CVP, not convinced volume overload is d/t heart failure. Recommend right heart catheterization on Monday, 8/5, for better diagnostic evaluation of the heart.

## 2024-08-03 NOTE — ASSESSMENT & PLAN NOTE
Concerned anasarca may represent sequelae of cirrhosis. EGD 8/2021 with Portal hypertensive gastropathy. No varices. No abd pain, fever, chills, or confusion to raise concern for SBP or HE respectively    - Liver doppler  - Aldactone and Lasix for diuresis  - Favor albumin if hypotensive  - LVP   -Initial SBP Gram stain negative for WBC making SBP less likely  -studies on ascites fluid  leaned slightly toward cardiac origin but some results pointed more towards the liver; Liver biopsy scheduled to help r/o the liver as the main cause.  Cardiology consulted and recommends a dianostic right heart cath on 8/5/24.

## 2024-08-03 NOTE — SUBJECTIVE & OBJECTIVE
Interval History: Pt nauseous and vomiting overnight. Abdominal distention slightly worse. No changes in CP or SOB.    Review of Systems   Constitutional: Positive for weight gain. Negative for chills and fever.   Cardiovascular:  Positive for chest pain, dyspnea on exertion, leg swelling, orthopnea and palpitations.   Respiratory:  Positive for shortness of breath. Negative for cough.    Gastrointestinal:  Positive for nausea and vomiting.   Neurological:  Negative for dizziness and headaches.     Objective:     Vital Signs (Most Recent):  Temp: 98.2 °F (36.8 °C) (08/03/24 0845)  Pulse: 60 (08/03/24 1230)  Resp: 16 (08/03/24 0507)  BP: 115/66 (08/03/24 1230)  SpO2: 96 % (08/03/24 1230) Vital Signs (24h Range):  Temp:  [98.2 °F (36.8 °C)-98.5 °F (36.9 °C)] 98.2 °F (36.8 °C)  Pulse:  [60-88] 60  Resp:  [16-18] 16  SpO2:  [95 %-100 %] 96 %  BP: (104-115)/(56-72) 115/66     Weight: 95.8 kg (211 lb 3.2 oz)  Body mass index is 35.15 kg/m².     SpO2: 96 %         Intake/Output Summary (Last 24 hours) at 8/3/2024 1507  Last data filed at 8/3/2024 0950  Gross per 24 hour   Intake --   Output 300 ml   Net -300 ml       Lines/Drains/Airways       Drain  Duration             Female External Urinary Catheter w/ Suction 07/27/24 0035 7 days              Peripheral Intravenous Line  Duration                  Peripheral IV - Single Lumen 08/01/24 1053 20 G Distal;Left;Posterior Forearm 2 days         Peripheral IV - Single Lumen 08/01/24 1105 20 G 1 3/4 in Anterior;Left Upper Arm 2 days                       Physical Exam  Constitutional:       General: She is not in acute distress.     Appearance: She is obese.   HENT:      Head: Normocephalic and atraumatic.      Mouth/Throat:      Pharynx: Oropharynx is clear.   Eyes:      Extraocular Movements: Extraocular movements intact.      Pupils: Pupils are equal, round, and reactive to light.   Cardiovascular:      Rate and Rhythm: Normal rate and regular rhythm.      Pulses: Normal  "pulses.      Heart sounds: Murmur heard.      Gallop present. S4 sounds present.   Pulmonary:      Effort: Pulmonary effort is normal.      Breath sounds: Normal breath sounds.   Abdominal:      General: There is distension.      Palpations: Abdomen is soft.      Tenderness: There is no abdominal tenderness.   Musculoskeletal:         General: Normal range of motion.      Cervical back: Neck supple.      Right lower leg: Edema present.      Left lower leg: Edema present.   Skin:     General: Skin is warm and dry.      Capillary Refill: Capillary refill takes less than 2 seconds.   Neurological:      General: No focal deficit present.      Mental Status: She is alert and oriented to person, place, and time.   Psychiatric:         Mood and Affect: Mood normal.         Behavior: Behavior normal.            Significant Labs: CMP   Recent Labs   Lab 08/02/24  0248 08/03/24  0551    139   K 3.8 4.1   CL 92* 93*   CO2 35* 38*    93   BUN 18 23*   CREATININE 0.9 0.9   CALCIUM 9.1 9.1   PROT 6.0 6.2   ALBUMIN 2.2*  2.2* 2.1*   BILITOT 1.2* 1.1*   ALKPHOS 119 138*   AST 18 26   ALT 5* <5*   ANIONGAP 12 8    and Troponin No results for input(s): "TROPONINI" in the last 48 hours.    Significant Imaging: Echocardiogram: Transthoracic echo (TTE) complete ([facility] Only):   Results for orders placed or performed during the hospital encounter of 07/26/24   Echo   Result Value Ref Range    BSA 2.31 m2    LVOT stroke volume 78.59 cm3    LVIDd 5.23 3.5 - 6.0 cm    LV Systolic Volume 49.86 mL    LV Systolic Volume Index 22.7 mL/m2    LVIDs 3.47 2.1 - 4.0 cm    LV Diastolic Volume 131.05 mL    LV Diastolic Volume Index 59.57 mL/m2    IVS 0.76 0.6 - 1.1 cm    LVOT diameter 2.0 cm    LVOT area 3.1 cm2    FS 34 28 - 44 %    Left Ventricle Relative Wall Thickness 0.31 cm    Posterior Wall 0.81 0.6 - 1.1 cm    LV mass 143.19 g    LV Mass Index 65 g/m2    MV Peak E Eulalio 1.06 m/s    TDI LATERAL 0.14 m/s    TDI SEPTAL 0.07 m/s    E/E' " ratio 10.10 m/s    TR Max Eulalio 3.58 m/s    IVRT 85.63 msec    E wave deceleration time 217.62 msec    LV SEPTAL E/E' RATIO 15.14 m/s    LA Volume Index 52.9 mL/m2    LV LATERAL E/E' RATIO 7.57 m/s    LA volume 116.34 cm3    LVOT peak eulalio 1.10 m/s    RV- trejo basal diam 5.2 cm    TAPSE 3.10 cm    LA size 4.93 cm    Left Atrium Minor Axis 6.11 cm    Left Atrium Major Axis 6.12 cm    LA volume (mod) 92.76 cm3    LA WIDTH 4.54 cm    LA Volume Index (Mod) 42.2 mL/m2    RA Major Axis 5.2 cm    RA Width 4.3 cm    AV mean gradient 13 mmHg    AV peak gradient 23 mmHg    Ao peak eulalio 2.39 m/s    Ao VTI 48.12 cm    LVOT peak VTI 25.03 cm    AV valve area 1.63 cm²    AV Velocity Ratio 0.46     AV index (prosthetic) 0.52     LENCHO by Velocity Ratio 1.45 cm²    MV stenosis pressure 1/2 time 63.11 ms    MV valve area p 1/2 method 3.49 cm2    Triscuspid Valve Regurgitation Peak Gradient 51 mmHg    Sinus 2.44 cm    STJ 2.26 cm    Ascending aorta 2.92 cm    Mean e' 0.11 m/s    ZLVIDS -2.17     ZLVIDD -3.62     TV resting pulmonary artery pressure 54 mmHg    RV TB RVSP 7 mmHg    Est. RA pres 3 mmHg    Narrative      Left Ventricle: The left ventricle is normal in size. Normal wall   thickness. Normal wall motion. There is normal systolic function with a   visually estimated ejection fraction of 60 - 65%. There is indeterminate   diastolic function.    Right Ventricle: Moderate right ventricular enlargement. Systolic   function is borderline low.    Left Atrium: Left atrium is severely dilated.    Right Atrium: Right atrium is dilated. Catheter present in the right   atrium.    Aortic Valve: The aortic valve is a trileaflet valve. There is mild   aortic valve sclerosis. There is mild annular calcification present. There   is mild stenosis. Aortic valve area by VTI is 1.63 cm². Aortic valve peak   velocity is 2.39 m/s. Mean gradient is 13 mmHg. The dimensionless index is   0.52.    Pulmonary Artery: There is moderate pulmonary hypertension.  The   estimated pulmonary artery systolic pressure is 54 mmHg.    IVC/SVC: Normal venous pressure at 3 mmHg.    Ascites present.

## 2024-08-03 NOTE — PROGRESS NOTES
[facility] ([place] [address])  [facility] Medicine  Progress Note    Patient Name: Vicky Alvarado  MRN: [id number]  Patient Class: IP- Inpatient   Admission Date: 7/26/2024  Length of Stay: 7 days  Attending Physician: Cory Hairston, *  Primary Care Provider: Gordy Cordero MD        Subjective:     Principal Problem:Acute on chronic diastolic heart failure        HPI:  Patient is a 59F with CHF (EF 60%, G2DD), HTN, Afib (s/p watchman procedure), T2DM (prev. on tirzepatide) presents to the ED w/ minor fall and progressively worsening SOB. Notes she couldn't get up after her fall, came in in a wheelchair. Notes she has had SOB for the past month with exertion, though now it is with rest as well. Reports orthopnea during this time frame. States she does not have any pain with breathing, but does additionally endorse occasional, intermittent chest pain. Denies abdominal pain, fever, or chills. States she has early satiety. No prior admission for HF exacerbation. Reports experiencing b/l lower extremity edema previously, but denies prior abdominal swelling. Swelling precluding normal ambulation, using walker (previously for balance) to assist d.t weakness. Has not missed doses of home lasix or antihypertensive medications. No reduced urine output at home.     In ED, normotensive, no tachycardia, some tachypnea (RR 22-28), initially on NRB -> HFNC 12L -> BIPAP -> 6L. No desaturations recorded during transitions in O2 therapy (BIPAP primarily placed for pulmonary edema seen on CXR). CBC with normocytic anemia (Hb 10.6), no leukocytosis. CMP with hypokalemia K 2.9, Cr 1.8/BUN 27 (baseline appears to be 1-1.4), Alb 2.7, no LFT elevation. Mg 1.5. , Troponin normal. VBG 7.46 / 42. CT AP with cirrhotic liver morphology, questionable GB sludge/stones, large volume ascites. CXR with hypoventilatory changes, R>L perihilar infiltrates c/f CHF. EKG NSR.  Received K replacement, 100mg IV Lasix in ED.        Overview/Hospital Course:  Patient is here for SOB due to volume overload. Patient has had CHF exacerbation in the past but also has evidence of a cirrhosis requiring a paracentesis in the ED in which they removed 7 L of fluid. The ascitic fluid was sent to lab and there is no evidence of SBP at this time. Abdominal CT shows a cirrhotic morphology of the liver. US/doppler of  liver shows evidence of cirrhosis, splenotmegally, and portal hypertension. She shows no signs of jaundice. Her liver enzymes, Alk Phos, and bilirubin are wnl. Protein studies of the ascitic fluid show increased protein leaning more towards a cardiac etiology.  CXR shows heart to be boarderline in regards to being enlarged and some perihilar infiltrate. ECHO on (7/29/24) showed intermediate diastolic dysfunction with EF 60-65%, minor aortic stenosis, and moderate pulmonary hypertension with estimated pulmonary artery systolic pressure is 54 mmHg.Paracentesis performed (7/30/24) removed an additional 9.8L.  She has had some problems with confusion overnight. Believe that home BIPAP slipping off and patient not getting adequate saturation may be playing a role. Oriented by morning. Respiratory therapy switched out her mask and saturations overnight improved. Studies on ascitic fluid from paracentesis have been somewhat mixed in results. SAAG on initial paracentesis was 1.0 with a total protein of 3.2. Ascitic fluid studies on the next paracentesis (that pulled out 9.8 L) showed a SAAG of 1.5 and total protein of 2.9. IR consulted for liver biopsy to help r/o the liver as the cause of ascities. Cardiology consulted and they recommend a right heart cath for Monday 8/5. PT/OT recommends acute skilled PT after discharge.     Interval History: IR consulted and will perform a liver biopsy to help r/o the liver as the causative agent of patient's ascites/anasarca. Cardiology consulted and recommend a diagnostic right heart cath on Monday 8/5/24.  Per respiratory therapy patient was not wearing her BIPAP mask this morning. She had slipped off her nasal canula as well, with her O2 stats dropping to around 88. Quickly climbed back into the 90s upon reinserting the nasal cannula on 2L.     Review of Systems   Constitutional:  Negative for chills and diaphoresis.   Respiratory:  Positive for shortness of breath. Negative for cough.         SOB still present but improved; down to 2L   Cardiovascular:  Positive for leg swelling. Negative for chest pain and palpitations.   Gastrointestinal:  Positive for abdominal distention. Negative for nausea and vomiting.   Genitourinary:  Negative for difficulty urinating, dysuria and hematuria.   Musculoskeletal:  Positive for arthralgias.        Complains of pain in her legs. Patient fell before coming to the ED.    Neurological:  Negative for dizziness and headaches.     Objective:     Vital Signs (Most Recent):  Temp: 98.2 °F (36.8 °C) (08/03/24 0845)  Pulse: 83 (08/03/24 0845)  Resp: 16 (08/03/24 0507)  BP: 115/68 (08/03/24 0845)  SpO2: 100 % (08/03/24 0845) Vital Signs (24h Range):  Temp:  [98.2 °F (36.8 °C)-98.5 °F (36.9 °C)] 98.2 °F (36.8 °C)  Pulse:  [63-88] 83  Resp:  [16-18] 16  SpO2:  [95 %-100 %] 100 %  BP: (104-124)/(56-72) 115/68     Weight: 95.8 kg (211 lb 3.2 oz)  Body mass index is 35.15 kg/m².    Intake/Output Summary (Last 24 hours) at 8/3/2024 1124  Last data filed at 8/3/2024 0950  Gross per 24 hour   Intake --   Output 300 ml   Net -300 ml         Physical Exam  Constitutional:       General: She is not in acute distress.     Appearance: She is obese. She is not ill-appearing.   HENT:      Head: Normocephalic and atraumatic.   Eyes:      General: No scleral icterus.  Cardiovascular:      Rate and Rhythm: Normal rate and regular rhythm.   Pulmonary:      Effort: Pulmonary effort is normal.      Breath sounds: Normal breath sounds.      Comments: Patient on 2 L nasal canula  Abdominal:      General: Bowel  sounds are normal. There is distension.      Tenderness: There is no abdominal tenderness. There is no guarding or rebound.   Musculoskeletal:      Right lower leg: Edema present.      Left lower leg: Edema present.      Comments: Edema improved since admission. Slight pitting edema.  Patient not wearing compression stockings.    Skin:     General: Skin is warm and dry.      Coloration: Skin is not jaundiced.   Neurological:      Mental Status: She is alert and oriented to person, place, and time.   Psychiatric:         Mood and Affect: Mood normal.         Behavior: Behavior normal.             Significant Labs: All pertinent labs within the past 24 hours have been reviewed.    Significant Imaging: I have reviewed all pertinent imaging results/findings within the past 24 hours.    Assessment/Plan:      * Acute on chronic diastolic heart failure  Cardiorenal syndrome  Acute HF exacerbation of uncertain etiology (no missed doses of lasix). Last TTE with EF 60% and G2DD. Concern that she may have new R-sided HF given anasarca (large volume ascites) versus now concomitant MASH cirrhosis compounding volume overloaded state and therefore needs higher diuresis at baseline. Suspect JHONATAN d.t overload - CRS.     Total 17L removed via para + 8L UOP  TTE with intermediate diastolic dysfunction with EF 60-65%, minor aortic stenosis, and moderate pulmonary hypertension with estimated pulmonary artery systolic pressure is 54 mmHg.    - Lasix 80mg IV BID  - Started spironolactone 50mg  - RFP BID  - BIPAP qhs and during naps     - Fluid restriction at 1200 cc with strict I/Os and daily weights   - Check Electrolytes, keep Mag >2 & K+ >4  - Ambulate as tolerated      Anasarca  Concerned anasarca may represent sequelae of cirrhosis. EGD 8/2021 with Portal hypertensive gastropathy. No varices. No abd pain, fever, chills, or confusion to raise concern for SBP or HE respectively    - Liver doppler  - Aldactone and Lasix for diuresis  -  Favor albumin if hypotensive  - LVP   -Initial SBP Gram stain negative for WBC making SBP less likely  -studies on ascites fluid  leaned slightly toward cardiac origin but some results pointed more towards the liver; Liver biopsy scheduled to help r/o the liver as the main cause.  Cardiology consulted and recommends a dianostic right heart cath on 8/5/24.      Stage 3b chronic kidney disease  Improved    Creatine stable for now. BMP reviewed- noted Estimated Creatinine Clearance: 63.6 mL/min (based on SCr of 1.2 mg/dL). according to latest data. Based on current GFR, CKD stage is stage 3 - GFR 30-59.  Monitor UOP and serial BMP and adjust therapy as needed. Renally dose meds. Avoid nephrotoxic medications and procedures.    NAFLD (nonalcoholic fatty liver disease)  Seen by Dr. Saba with hepatology for previous chronic mild elevation in LFT, determined most likely NADLF. Now with cirrhotic morphology on CT. Previous EGD with portal gastropathy. LFT normal on admit. 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     - Liver US with Doppler  - Started on spironolactone 25mg   - Lasix 80 tid   -Lasix has been transitioned to 80 BID  -IR consulted; will perform a diagnostic liver biopsy      Volume overload  IR consulted for diagnostic liver biopsy to help r/o the liver as a primary cause    Cardiology consulted and recommends a diagnostic heart cath on 8/5/24.      Type 2 diabetes mellitus with diabetic polyneuropathy, without long-term current use of insulin  Patient's FSGs are controlled on current medication regimen.  Last A1c reviewed-   Lab Results   Component Value Date    HGBA1C 4.9 07/27/2024     Blood sugars are staying in the 80s-90s  Current correctional scale   holding as BG stable    Hold Oral hypoglycemics while patient is in the hospital.    Atrial Fibrillation (paroxysmal)  Patient with Paroxysmal (<7 days) atrial fibrillation which is controlled currently with Beta Blocker  (Metoprolol succinate 100 mg BID) Patient is currently in sinus rhythm.[id number] Score: 3. Anticoagulation not indicated due to has Watchman .    Essential hypertension  Chronic, controlled. Latest blood pressure and vitals reviewed-     Temp:  [97.5 °F (36.4 °C)-98.5 °F (36.9 °C)]   Pulse:  [60-81]   Resp:  [12-22]   BP: ()/(57-69)   SpO2:  [68 %-100 %] .   Home meds for hypertension were reviewed and noted below.   Hypertension Medications               furosemide (LASIX) 40 MG tablet Take 1 tablet (40 mg total) by mouth 2 (two) times daily. Resume as needed for edema until followup with your PCP.    lisinopriL (PRINIVIL,ZESTRIL) 40 MG tablet Take 1 tablet (40 mg total) by mouth once daily.    metoprolol succinate (TOPROL-XL) 100 MG 24 hr tablet Take 1 tablet (100 mg total) by mouth 2 (two) times daily.    NIFEdipine (PROCARDIA-XL) 60 MG (OSM) 24 hr tablet Take 1 tablet (60 mg total) by mouth once daily.            While in the hospital, will manage blood pressure as follows; Adjust home antihypertensive regimen as follows- Continue toprol for afib, hold other antihypertensives d/t need for aggressive diuresis plus presence of JHONATAN. Did add spironolactone for K retention and now presence of ascites in setting of possible cirrhosis     Will utilize p.r.n. blood pressure medication only if patient's blood pressure greater than 220/110 and she develops symptoms such as worsening chest pain or shortness of breath.      VTE Risk Mitigation (From admission, onward)           Ordered     heparin (porcine) injection 7,500 Units  Every 8 hours         07/29/24 1202     IP VTE HIGH RISK PATIENT  Once         07/27/24 0351     Place sequential compression device  Until discontinued         07/27/24 0351                    Discharge Planning   RAYO: 8/8/2024     Code Status: Full Code   Is the patient medically ready for discharge?:     Reason for patient still in hospital (select all that apply): Patient trending  condition, Laboratory test, and Treatment  Discharge Plan A: Skilled Nursing Facility   Discharge Delays: (!) Procedure Scheduling (IR, OR, Labs, Echo, Cath, Echo, EEG) (hepatology consulted)              Marah Castro MD  Department of Hospital Medicine   [facility] ([place])

## 2024-08-03 NOTE — PLAN OF CARE
8/3- Pt still with loose stools, MD came to bedside to assess stools to r/o if Cdiff testing was necessary. Pt stated medical team said her Liver Biopsy will be Monday 08/5. Pt had 1 episode of nausea and vomiting, trigger unknown. Daughter brought outside food to bedside, was advised to no longer consume what was brought in. Prn antiemetic given        Problem: Adult Inpatient Plan of Care  Goal: Plan of Care Review  Outcome: Progressing  Goal: Patient-Specific Goal (Individualized)  Outcome: Progressing  Goal: Absence of Hospital-Acquired Illness or Injury  Outcome: Progressing  Goal: Optimal Comfort and Wellbeing  Outcome: Progressing  Goal: Readiness for Transition of Care  Outcome: Progressing     Problem: Bariatric Environmental Safety  Goal: Safety Maintained with Care  Outcome: Progressing     Problem: Diabetes Comorbidity  Goal: Blood Glucose Level Within Targeted Range  Outcome: Progressing     Problem: Wound  Goal: Optimal Coping  Outcome: Progressing  Goal: Absence of Infection Signs and Symptoms  Outcome: Progressing  Goal: Improved Oral Intake  Outcome: Progressing  Goal: Optimal Pain Control and Function  Outcome: Progressing  Goal: Optimal Wound Healing  Outcome: Progressing     Problem: Pain Acute  Goal: Optimal Pain Control and Function  Outcome: Progressing     Problem: Liver Failure  Goal: Blood Glucose Level Within Target Range  Outcome: Progressing  Goal: Optimal Coagulation Function  Outcome: Progressing  Goal: Absence of Infection Signs and Symptoms  Outcome: Progressing  Goal: Optimal Neurologic Function  Outcome: Progressing  Goal: Improved Oral Intake  Outcome: Progressing  Goal: Optimal Pain Control, Comfort and Function  Outcome: Progressing  Goal: Optimize Renal Function  Outcome: Progressing  Goal: Effective Oxygenation and Ventilation  Outcome: Progressing

## 2024-08-03 NOTE — PROGRESS NOTES
[facility] ([place] [id number])  Cardiology  Progress Note    Patient Name: Vicky Alvarado  MRN: [id number]  Admission Date: 7/26/2024  Hospital Length of Stay: 7 days  Code Status: Full Code   Attending Physician: Cory Hairston, *   Primary Care Physician: Gordy Cordero MD  Expected Discharge Date: 8/8/2024  Principal Problem:Acute on chronic diastolic heart failure    Subjective:     Hospital Course:   No notes on file    Interval History: Pt nauseous and vomiting overnight. Abdominal distention slightly worse. No changes in CP or SOB.    Review of Systems   Constitutional: Positive for weight gain. Negative for chills and fever.   Cardiovascular:  Positive for chest pain, dyspnea on exertion, leg swelling, orthopnea and palpitations.   Respiratory:  Positive for shortness of breath. Negative for cough.    Gastrointestinal:  Positive for nausea and vomiting.   Neurological:  Negative for dizziness and headaches.     Objective:     Vital Signs (Most Recent):  Temp: 98.2 °F (36.8 °C) (08/03/24 0845)  Pulse: 60 (08/03/24 1230)  Resp: 16 (08/03/24 0507)  BP: 115/66 (08/03/24 1230)  SpO2: 96 % (08/03/24 1230) Vital Signs (24h Range):  Temp:  [98.2 °F (36.8 °C)-98.5 °F (36.9 °C)] 98.2 °F (36.8 °C)  Pulse:  [60-88] 60  Resp:  [16-18] 16  SpO2:  [95 %-100 %] 96 %  BP: (104-115)/(56-72) 115/66     Weight: 95.8 kg (211 lb 3.2 oz)  Body mass index is 35.15 kg/m².     SpO2: 96 %         Intake/Output Summary (Last 24 hours) at 8/3/2024 1507  Last data filed at 8/3/2024 0950  Gross per 24 hour   Intake --   Output 300 ml   Net -300 ml       Lines/Drains/Airways       Drain  Duration             Female External Urinary Catheter w/ Suction 07/27/24 0035 7 days              Peripheral Intravenous Line  Duration                  Peripheral IV - Single Lumen 08/01/24 1053 20 G Distal;Left;Posterior Forearm 2 days         Peripheral IV - Single Lumen 08/01/24 1105 20 G 1 3/4 in Anterior;Left Upper Arm 2  "days                       Physical Exam  Constitutional:       General: She is not in acute distress.     Appearance: She is obese.   HENT:      Head: Normocephalic and atraumatic.      Mouth/Throat:      Pharynx: Oropharynx is clear.   Eyes:      Extraocular Movements: Extraocular movements intact.      Pupils: Pupils are equal, round, and reactive to light.   Cardiovascular:      Rate and Rhythm: Normal rate and regular rhythm.      Pulses: Normal pulses.      Heart sounds: Murmur heard.      Gallop present. S4 sounds present.   Pulmonary:      Effort: Pulmonary effort is normal.      Breath sounds: Normal breath sounds.   Abdominal:      General: There is distension.      Palpations: Abdomen is soft.      Tenderness: There is no abdominal tenderness.   Musculoskeletal:         General: Normal range of motion.      Cervical back: Neck supple.      Right lower leg: Edema present.      Left lower leg: Edema present.   Skin:     General: Skin is warm and dry.      Capillary Refill: Capillary refill takes less than 2 seconds.   Neurological:      General: No focal deficit present.      Mental Status: She is alert and oriented to person, place, and time.   Psychiatric:         Mood and Affect: Mood normal.         Behavior: Behavior normal.            Significant Labs: CMP   Recent Labs   Lab 08/02/24  0248 08/03/24  0551    139   K 3.8 4.1   CL 92* 93*   CO2 35* 38*    93   BUN 18 23*   CREATININE 0.9 0.9   CALCIUM 9.1 9.1   PROT 6.0 6.2   ALBUMIN 2.2*  2.2* 2.1*   BILITOT 1.2* 1.1*   ALKPHOS 119 138*   AST 18 26   ALT 5* <5*   ANIONGAP 12 8    and Troponin No results for input(s): "TROPONINI" in the last 48 hours.    Significant Imaging: Echocardiogram: Transthoracic echo (TTE) complete ([facility] Only):   Results for orders placed or performed during the hospital encounter of 07/26/24   Echo   Result Value Ref Range    BSA 2.31 m2    LVOT stroke volume 78.59 cm3    LVIDd 5.23 3.5 - 6.0 cm    LV Systolic " Volume 49.86 mL    LV Systolic Volume Index 22.7 mL/m2    LVIDs 3.47 2.1 - 4.0 cm    LV Diastolic Volume 131.05 mL    LV Diastolic Volume Index 59.57 mL/m2    IVS 0.76 0.6 - 1.1 cm    LVOT diameter 2.0 cm    LVOT area 3.1 cm2    FS 34 28 - 44 %    Left Ventricle Relative Wall Thickness 0.31 cm    Posterior Wall 0.81 0.6 - 1.1 cm    LV mass 143.19 g    LV Mass Index 65 g/m2    MV Peak E Eulalio 1.06 m/s    TDI LATERAL 0.14 m/s    TDI SEPTAL 0.07 m/s    E/E' ratio 10.10 m/s    TR Max Eulalio 3.58 m/s    IVRT 85.63 msec    E wave deceleration time 217.62 msec    LV SEPTAL E/E' RATIO 15.14 m/s    LA Volume Index 52.9 mL/m2    LV LATERAL E/E' RATIO 7.57 m/s    LA volume 116.34 cm3    LVOT peak eulalio 1.10 m/s    RV- trejo basal diam 5.2 cm    TAPSE 3.10 cm    LA size 4.93 cm    Left Atrium Minor Axis 6.11 cm    Left Atrium Major Axis 6.12 cm    LA volume (mod) 92.76 cm3    LA WIDTH 4.54 cm    LA Volume Index (Mod) 42.2 mL/m2    RA Major Axis 5.2 cm    RA Width 4.3 cm    AV mean gradient 13 mmHg    AV peak gradient 23 mmHg    Ao peak eulalio 2.39 m/s    Ao VTI 48.12 cm    LVOT peak VTI 25.03 cm    AV valve area 1.63 cm²    AV Velocity Ratio 0.46     AV index (prosthetic) 0.52     LENCHO by Velocity Ratio 1.45 cm²    MV stenosis pressure 1/2 time 63.11 ms    MV valve area p 1/2 method 3.49 cm2    Triscuspid Valve Regurgitation Peak Gradient 51 mmHg    Sinus 2.44 cm    STJ 2.26 cm    Ascending aorta 2.92 cm    Mean e' 0.11 m/s    ZLVIDS -2.17     ZLVIDD -3.62     TV resting pulmonary artery pressure 54 mmHg    RV TB RVSP 7 mmHg    Est. RA pres 3 mmHg    Narrative      Left Ventricle: The left ventricle is normal in size. Normal wall   thickness. Normal wall motion. There is normal systolic function with a   visually estimated ejection fraction of 60 - 65%. There is indeterminate   diastolic function.    Right Ventricle: Moderate right ventricular enlargement. Systolic   function is borderline low.    Left Atrium: Left atrium is severely  dilated.    Right Atrium: Right atrium is dilated. Catheter present in the right   atrium.    Aortic Valve: The aortic valve is a trileaflet valve. There is mild   aortic valve sclerosis. There is mild annular calcification present. There   is mild stenosis. Aortic valve area by VTI is 1.63 cm². Aortic valve peak   velocity is 2.39 m/s. Mean gradient is 13 mmHg. The dimensionless index is   0.52.    Pulmonary Artery: There is moderate pulmonary hypertension. The   estimated pulmonary artery systolic pressure is 54 mmHg.    IVC/SVC: Normal venous pressure at 3 mmHg.    Ascites present.       Assessment and Plan:     Volume overload  Liver US with doppler ordered with evidence of cirrhotic liver morphology with portal HTN, splenomegaly and moderate volume ascites. Last seen in hepatology clinic by Dr. Gutierres in 4/2023 with plans for TJ liver biopsy with pressure measurements, but seems patient was lost to follow up. LFTs and ALP wnl. Tbili today slightly elevated at 1.2 with direct bili 0.8.     Ascitic fluid studies with initial SAAG was 1.0 with total ascitic protein of 3.2. Repeat paracentesis with removal of 10L on 7/30 had SAAG of 1.5 with total ascitic protein of 2.9. Consistent with exudative fluid, as protein >2.5 g. If overload in setting of heart failure, would expect ascitic fluid to be transudative.    , troponin <0.006. EKG shows RBBB. Has been receiving IV furosemide 80 mg BID. UOP documented at 300 mL, though accuracy of documentation questionable as pt reported frequent urination and significant improvement in peripheral edema with diuresis. Pt did state to feel as if she was in afib all day on 8/1. She is currently compliant on metoprolol succinate 100 mg and nifedipine 60 mg for rate control at home. Also s/p watchman placement in 2023. 7/28 TTE with EF 60-65%, moderate RV enlargement, LA severe dilation, CVP 3 mmHg, moderate pulmonary hypertension with pulmonary artery systolic pressure 54  mmHg, and mild AS. S4 heart sound on exam, likely in setting of pulmonary HTN. Abdominal swelling also notably increased. Lungs CTAB. Given low CVP, not convinced volume overload is d/t heart failure. Recommend right heart catheterization on Monday, 8/5, for better diagnostic evaluation of the heart.        VTE Risk Mitigation (From admission, onward)           Ordered     heparin (porcine) injection 7,500 Units  Every 8 hours         07/29/24 1202     IP VTE HIGH RISK PATIENT  Once         07/27/24 0351     Place sequential compression device  Until discontinued         07/27/24 0351                    Krystyna Livingston, DO  Cardiology  [facility] ([place])

## 2024-08-03 NOTE — ASSESSMENT & PLAN NOTE
IR consulted for diagnostic liver biopsy to help r/o the liver as a primary cause    Cardiology consulted and recommends a diagnostic heart cath on 8/5/24.

## 2024-08-03 NOTE — SUBJECTIVE & OBJECTIVE
Interval History: IR consulted and will perform a liver biopsy to help r/o the liver as the causative agent of patient's ascites/anasarca. Cardiology consulted and recommend a diagnostic right heart cath on Monday 8/5/24. Per respiratory therapy patient was not wearing her BIPAP mask this morning. She had slipped off her nasal canula as well, with her O2 stats dropping to around 88. Quickly climbed back into the 90s upon reinserting the nasal cannula on 2L.     Review of Systems   Constitutional:  Negative for chills and diaphoresis.   Respiratory:  Positive for shortness of breath. Negative for cough.         SOB still present but improved; down to 2L   Cardiovascular:  Positive for leg swelling. Negative for chest pain and palpitations.   Gastrointestinal:  Positive for abdominal distention. Negative for nausea and vomiting.   Genitourinary:  Negative for difficulty urinating, dysuria and hematuria.   Musculoskeletal:  Positive for arthralgias.        Complains of pain in her legs. Patient fell before coming to the ED.    Neurological:  Negative for dizziness and headaches.     Objective:     Vital Signs (Most Recent):  Temp: 98.2 °F (36.8 °C) (08/03/24 0845)  Pulse: 83 (08/03/24 0845)  Resp: 16 (08/03/24 0507)  BP: 115/68 (08/03/24 0845)  SpO2: 100 % (08/03/24 0845) Vital Signs (24h Range):  Temp:  [98.2 °F (36.8 °C)-98.5 °F (36.9 °C)] 98.2 °F (36.8 °C)  Pulse:  [63-88] 83  Resp:  [16-18] 16  SpO2:  [95 %-100 %] 100 %  BP: (104-124)/(56-72) 115/68     Weight: 95.8 kg (211 lb 3.2 oz)  Body mass index is 35.15 kg/m².    Intake/Output Summary (Last 24 hours) at 8/3/2024 1124  Last data filed at 8/3/2024 0950  Gross per 24 hour   Intake --   Output 300 ml   Net -300 ml         Physical Exam  Constitutional:       General: She is not in acute distress.     Appearance: She is obese. She is not ill-appearing.   HENT:      Head: Normocephalic and atraumatic.   Eyes:      General: No scleral icterus.  Cardiovascular:       Rate and Rhythm: Normal rate and regular rhythm.   Pulmonary:      Effort: Pulmonary effort is normal.      Breath sounds: Normal breath sounds.      Comments: Patient on 2 L nasal canula  Abdominal:      General: Bowel sounds are normal. There is distension.      Tenderness: There is no abdominal tenderness. There is no guarding or rebound.   Musculoskeletal:      Right lower leg: Edema present.      Left lower leg: Edema present.      Comments: Edema improved since admission. Slight pitting edema.  Patient not wearing compression stockings.    Skin:     General: Skin is warm and dry.      Coloration: Skin is not jaundiced.   Neurological:      Mental Status: She is alert and oriented to person, place, and time.   Psychiatric:         Mood and Affect: Mood normal.         Behavior: Behavior normal.             Significant Labs: All pertinent labs within the past 24 hours have been reviewed.    Significant Imaging: I have reviewed all pertinent imaging results/findings within the past 24 hours.

## 2024-08-03 NOTE — HPI
Patient is a 59F with CHF (EF 60%, G2DD), HTN, Afib (s/p watchman procedure placed in 5/2023), T2DM presented to the ED for frequent falls and progressively worsening SOB. Notes that SOB for the past ~6 months has mostly been with exertion,  though now it is with rest as well. Reports orthopnea during this time frame. States she does not have any pain with breathing, but does additionally have occasional, intermittent chest pressure/discomfort, as well as bilateral LE edema. States symptoms were noticeably worse the last week prior to [place] [facility] [place].     In the ED, pt received a paracentesis with removal of 7L. Ascitic fluid shows no evidence of SBP at this time. Abdominal CT shows a cirrhotic morphology of the liver. US/doppler of  liver shows evidence of cirrhosis, splenotmegally, and portal hypertension. Pt w/o signs of jaundice. Denies pruritus. Her liver enzymes, Alk Phos, and bilirubin are wnl. Protein studies of the ascitic fluid show increased protein leaning more towards a cardiac etiology.  Initial SAAG was 1.0 with total ascitic protein of 3.2. Repeat paracentesis with removal of 10L on 7/30 had SAAG of 1.5 with total ascitic protein of 2.9. CXR shows heart to be borderline enlarged and some perihilar infiltrate. ECHO on (7/29/24) showed intermediate diastolic dysfunction with EF 60-65%, minor aortic stenosis, and moderate pulmonary hypertension with estimated pulmonary artery systolic pressure is 54 mmHg. Repeat paracentesis performed (7/30/24) removed an additional 9.8L. Cardiology consulted for overload and concern for cardiogenic etiology related to right heart failure.

## 2024-08-03 NOTE — CONSULTS
[facility] ([place] [id number])  Cardiology  Consult Note    Patient Name: Vicky Alvarado  MRN: [id number]  Admission Date: 7/26/2024  Hospital Length of Stay: 7 days  Code Status: Full Code   Attending Provider: Ramon Toure DO   Consulting Provider: Krystyna Livingston DO  Primary Care Physician: Gordy Cordero MD  Principal Problem:Acute on chronic diastolic heart failure    Patient information was obtained from patient, past medical records, ER records, and primary team.     Inpatient consult to Cardiology  Consult performed by: Krystyna Livingston DO  Consult ordered by: Ramon Toure DO        Subjective:     HPI:   Patient is a 59F with CHF (EF 60%, G2DD), HTN, Afib (s/p watchman procedure placed in 5/2023), T2DM presented to the ED for frequent falls and progressively worsening SOB. Notes that SOB for the past ~6 months has mostly been with exertion,  though now it is with rest as well. Reports orthopnea during this time frame. States she does not have any pain with breathing, but does additionally have occasional, intermittent chest pressure/discomfort, as well as bilateral LE edema. States symptoms were noticeably worse the last week prior to presenting to [facility] ED this admission.     In the ED, pt received a paracentesis with removal of 7L. Ascitic fluid shows no evidence of SBP at this time. Abdominal CT shows a cirrhotic morphology of the liver. US/doppler of  liver shows evidence of cirrhosis, splenotmegally, and portal hypertension. Pt w/o signs of jaundice. Denies pruritus. Her liver enzymes, Alk Phos, and bilirubin are wnl. Protein studies of the ascitic fluid show increased protein leaning more towards a cardiac etiology.  Initial SAAG was 1.0 with total ascitic protein of 3.2. Repeat paracentesis with removal of 10L on 7/30 had SAAG of 1.5 with total ascitic protein of 2.9. CXR shows heart to be borderline enlarged and some perihilar infiltrate. ECHO on (7/29/24) showed intermediate  diastolic dysfunction with EF 60-65%, minor aortic stenosis, and moderate pulmonary hypertension with estimated pulmonary artery systolic pressure is 54 mmHg. Repeat paracentesis performed (7/30/24) removed an additional 9.8L. Cardiology consulted for overload and concern for cardiogenic etiology related to right heart failure.     Past Medical History:   Diagnosis Date    Anticoagulant long-term use     Arthritis     Atrial fibrillation     cardioversion    Atrial fibrillation with RVR     HAS WATCHMAN IN PLACE NOW    Avascular necrosis     L hand    CHF (congestive heart failure)     Chronic fatigue     Depression     Diabetes mellitus     Encounter for blood transfusion 07/22/2020    Encounter for blood transfusion 03/2022    Fatty liver     GERD (gastroesophageal reflux disease)     Hx of psychiatric care     Hypertension     Iron deficiency anemia 05/07/2022    TIBC 444 with saturated iron 8    Liver disease     Obese     Pre-diabetes 05/07/2022    Psychiatric problem     Sleep apnea     wears cpap    SOB (shortness of breath) on exertion     Weight loss     75lb intentional weight loss       Past Surgical History:   Procedure Laterality Date    ABLATION OF ARRHYTHMOGENIC FOCUS FOR ATRIAL FIBRILLATION N/A 07/20/2020    Procedure: Ablation atrial fibrillation;  Surgeon: Gabriel Hawley MD;  Location: [facility] EP LAB;  Service: Cardiology;  Laterality: N/A;  afib, PVI, RFA, REENA, SHADY, anes, MB, 3 Prep    ABLATION OF ARRHYTHMOGENIC FOCUS FOR ATRIAL FIBRILLATION N/A 01/24/2022    Procedure: Ablation atrial fibrillation;  Surgeon: Gabriel Hawley MD;  Location: [facility] EP LAB;  Service: Cardiology;  Laterality: N/A;  afib/afl, PVI (re-do)/CTI, RFA, REENA (cx if SR), SHADY, anes, MB, 3 Prep    APPLICATION OF WOUND VACUUM-ASSISTED CLOSURE DEVICE Left 08/03/2020    Procedure: APPLICATION, WOUND VAC;  Surgeon: SEMAJ Márquez III, MD;  Location: [facility] OR [address] [place] [address];  Service: Peripheral Vascular;  Laterality: Left;     APPLICATION OF WOUND VACUUM-ASSISTED CLOSURE DEVICE Left 08/06/2020    Procedure: APPLICATION, WOUND VAC;  Surgeon: SEMAJ Márquez III, MD;  Location: [facility] OR 2ND FLR;  Service: Peripheral Vascular;  Laterality: Left;    CARPAL TUNNEL RELEASE Right 06/10/2020    Procedure: RELEASE, CARPAL TUNNEL - RIGHT;  Surgeon: Adelaida Hall MD;  Location: [facility] [place] OR;  Service: Orthopedics;  Laterality: Right;  GENERAL AND REGIONAL    CARPAL TUNNEL RELEASE Left 05/05/2021    Procedure: RELEASE, CARPAL TUNNEL, LEFT;  Surgeon: Adelaida Hall MD;  Location: [facility] [place] OR;  Service: Orthopedics;  Laterality: Left;  GENERAL & REGIONAL IN PACU    CARPECTOMY Left 9/6/2023    Procedure: CARPECTOMY;  Surgeon: Adelaida Hall MD;  Location: [facility] [place] OR;  Service: Orthopedics;  Laterality: Left;  MAC/REGIONAL  LEFT PROXIMAL ROW    CLOSURE OF WOUND Left 08/06/2020    Procedure: CLOSURE, WOUND;  Surgeon: SEMAJ Márquez III, MD;  [facility];  Service: Peripheral Vascular;  Laterality: Left;  Complex    COLONOSCOPY N/A 08/17/2021    Procedure: COLONOSCOPY Suprep;  Surgeon: Loco Deluca MD;  Location: [facility];  Service: Endoscopy;  Laterality: N/A;    ECHOCARDIOGRAM,TRANSESOPHAGEAL N/A 07/24/2023    Procedure: Transesophageal echo (REENA) intra-procedure log documentation;  Surgeon: Leyla Diagnostic Provider;  Location: [facility] EP LAB;  Service: Cardiology;  Laterality: N/A;  s/p WM, REENA, anes, 3 Prep    ESOPHAGOGASTRODUODENOSCOPY N/A 08/17/2021    Procedure: EGD (ESOPHAGOGASTRODUODENOSCOPY);  Surgeon: Loco Deluca MD;  Location: [facility] ENDO;  Service: Endoscopy;  Laterality: N/A;  Patient is schedule to have her Covid test on 08/14/2021 at the [facility] @ 9:30am. [place].    EXPLORATION OF FEMORAL ARTERY Left 07/21/2020    Procedure: EXPLORATION, ARTERY, FEMORAL;  Surgeon: SEMAJ Márquez III, MD;  Location: [facility] OR 2ND FLR;  Service: Peripheral Vascular;  Laterality: Left;  1. Urgent Direct repair,  L SFA branch laceration    FOOT SURGERY      HYSTERECTOMY      HYSTEROSCOPY WITH DILATION AND CURETTAGE OF UTERUS N/A 02/19/2022    Procedure: HYSTEROSCOPY, WITH DILATION AND CURETTAGE OF UTERUS;  Surgeon: Shane Palomo MD;  Location: [address];  Service: OB/GYN;  Laterality: N/A;    INCISION AND DRAINAGE OF KNEE Left 05/12/2022    Procedure: INCISION AND DRAINAGE, Prepatellar bursectomy.;  Surgeon: Dre Guzmán MD;  [address]  Service: Orthopedics;  Laterality: Left;    LEFT HEART CATHETERIZATION Left 02/07/2023    Procedure: Left heart cath;  Surgeon: Josiah Terry MD;  Location: [facility] CATH LAB;  Service: Cardiology;  Laterality: Left;  borderline moderate bleeding risk 6.3%    OCCLUSION OF LEFT ATRIAL APPENDAGE N/A 06/12/2023    Procedure: Left atrial appendage occlusion;  Surgeon: Gabriel Hawley MD;  Location: [facility] EP LAB;  Service: Cardiology;  Laterality: N/A;  afib, watchman, BSCI, demi, fred, MB, 3prep    RELEASE OF ULNAR NERVE AT CUBITAL TUNNEL Bilateral     ROBOT-ASSISTED LAPAROSCOPIC ABDOMINAL HYSTERECTOMY USING DA KEIRY XI N/A 08/09/2022    Procedure: XI ROBOTIC HYSTERECTOMY;  Surgeon: Yolanda Barriga MD;  Location: [facility];  Service: OB/GYN;  Laterality: N/A;  dual console requested    ROBOT-ASSISTED LAPAROSCOPIC SALPINGO-OOPHORECTOMY Bilateral 08/09/2022    Procedure: ROBOTIC SALPINGO-OOPHORECTOMY;  Surgeon: Yolanda Barriga MD;  Location: [facility];  Service: OB/GYN;  Laterality: Bilateral;    TRANSESOPHAGEAL ECHOCARDIOGRAPHY N/A 06/12/2023    Procedure: ECHOCARDIOGRAM, TRANSESOPHAGEAL;  Surgeon: Cyril Mast MD;  Location: [facility] EP LAB;  Service: Cardiology;  Laterality: N/A;    TREATMENT OF CARDIAC ARRHYTHMIA N/A 01/29/2020    Procedure: CARDIOVERSION;  Surgeon: Gabriel Hawley MD;  Location: [facility] EP LAB;  Service: Cardiology;  Laterality: N/A;  af, demi, dccv, anes, mb, 345    TREATMENT OF CARDIAC ARRHYTHMIA  01/24/2022    Procedure: Cardioversion or  Defibrillation;  Surgeon: Gabriel Hawley MD;  Location: [facility] EP LAB;  Service: Cardiology;;    WOUND DEBRIDEMENT Left 08/06/2020    Procedure: DEBRIDEMENT, WOUND;  Surgeon: SEMAJ Márquez III, MD;  Location: [facility] OR [address] [place] [address];  Service: Peripheral Vascular;  Laterality: Left;       Review of patient's allergies indicates:   Allergen Reactions    Flecainide Shortness Of Breath and Swelling    Shellfish containing products Other (See Comments)     Makes gout terrible    Vancomycin analogues Hives, Itching and Rash       Current Facility-Administered Medications on File Prior to Encounter   Medication    mupirocin 2 % ointment     Current Outpatient Medications on File Prior to Encounter   Medication Sig    acetaminophen (TYLENOL) 500 MG tablet Take 2 tablets (1,000 mg total) by mouth 2 (two) times daily as needed for Pain. Begin post op day 3. (Patient taking differently: Take 1,000 mg by mouth every evening. Begin post op day 3.)    albuterol (VENTOLIN HFA) 90 mcg/actuation inhaler Inhale 2 puffs into the lungs every 6 (six) hours as needed for Wheezing. Rescue (Patient taking differently: Inhale 2 puffs into the lungs daily as needed for Wheezing or Shortness of Breath. Rescue)    ALPRAZolam (XANAX) 0.5 MG tablet Take 1 tablet (0.5 mg total) by mouth 2 (two) times daily as needed for Anxiety. (Patient taking differently: Take 0.5 mg by mouth daily as needed for Anxiety.)    aspirin (ECOTRIN) 81 MG EC tablet Take 81 mg by mouth once daily.    atorvastatin (LIPITOR) 80 MG tablet Take 1 tablet (80 mg total) by mouth once daily. (Patient taking differently: Take 80 mg by mouth every evening.)    b complex vitamins capsule Take 1 capsule by mouth every other day.    colchicine (COLCRYS) 0.6 mg tablet For gout attack: Take TWO tablets by mouth, then, 2 hours later, take ONE additional tablet. Then take 1 tablet twice daily only if still needed for gout pain. (Patient taking differently: Take 0.6 mg by mouth daily as  needed (For gout attack: Take TWO tablets by mouth, then, 2 hours later, take ONE additional tablet. Then take 1 tablet twice daily only if still needed for gout pain.).)    DULoxetine (CYMBALTA) 60 MG capsule Take 2 capsules (120 mg total) by mouth once daily.    lisinopriL (PRINIVIL,ZESTRIL) 40 MG tablet Take 1 tablet (40 mg total) by mouth once daily.    metoprolol succinate (TOPROL-XL) 100 MG 24 hr tablet Take 1 tablet (100 mg total) by mouth 2 (two) times daily.    multivitamin (THERAGRAN) per tablet Take 1 tablet by mouth once daily.    NIFEdipine (PROCARDIA-XL) 60 MG (OSM) 24 hr tablet Take 1 tablet (60 mg total) by mouth once daily.    omeprazole (PRILOSEC) 20 MG capsule TAKE 1 CAPSULE BY MOUTH ONCE DAILY (Patient taking differently: Take 20 mg by mouth daily as needed (hearturn/ indegestion).)    [DISCONTINUED] medroxyPROGESTERone (PROVERA) 10 MG tablet Take 2 tablets (20 mg total) by mouth 3 (three) times daily.    [DISCONTINUED] pantoprazole (PROTONIX) 40 MG tablet Take 1 tablet (40 mg total) by mouth once daily. (Patient not taking: Reported on 2022)     Family History       Problem Relation (Age of Onset)    Cataracts Mother    Diabetes Father    Hypertension Mother, Father, Sister, Brother    Thyroid disease Mother          Tobacco Use    Smoking status: Former     Current packs/day: 0.00     Types: Cigarettes     Quit date: 2019     Years since quittin.0    Smokeless tobacco: Never   Substance and Sexual Activity    Alcohol use: No    Drug use: No    Sexual activity: Not Currently     Partners: Male     Birth control/protection: See Surgical Hx     Review of Systems   Constitutional: Positive for weight gain. Negative for chills and fever.   Cardiovascular:  Positive for chest pain, dyspnea on exertion, leg swelling, orthopnea and palpitations.   Respiratory:  Positive for shortness of breath. Negative for cough.    Gastrointestinal:  Negative for nausea and vomiting.   Neurological:   Negative for dizziness and headaches.     Objective:     Vital Signs (Most Recent):  Temp: 98.4 °F (36.9 °C) (08/02/24 2235)  Pulse: 71 (08/03/24 0011)  Resp: 18 (08/03/24 0011)  BP: 105/64 (08/02/24 2235)  SpO2: 96 % (08/03/24 0011) Vital Signs (24h Range):  Temp:  [97.7 °F (36.5 °C)-98.4 °F (36.9 °C)] 98.4 °F (36.9 °C)  Pulse:  [71-96] 71  Resp:  [17-23] 18  SpO2:  [95 %-97 %] 96 %  BP: ()/(62-72) 105/64     Weight: 101.1 kg (222 lb 14.2 oz)  Body mass index is 37.09 kg/m².    SpO2: 96 %       No intake or output data in the 24 hours ending 08/03/24 0057    Lines/Drains/Airways       Drain  Duration             Female External Urinary Catheter w/ Suction 07/27/24 0035 7 days              Peripheral Intravenous Line  Duration                  Peripheral IV - Single Lumen 08/01/24 1053 20 G Distal;Left;Posterior Forearm 1 day         Peripheral IV - Single Lumen 08/01/24 1105 20 G 1 3/4 in Anterior;Left Upper Arm 1 day                     Physical Exam  Constitutional:       General: She is not in acute distress.     Appearance: She is obese.   HENT:      Head: Normocephalic and atraumatic.      Mouth/Throat:      Pharynx: Oropharynx is clear.   Eyes:      Extraocular Movements: Extraocular movements intact.      Pupils: Pupils are equal, round, and reactive to light.   Cardiovascular:      Rate and Rhythm: Normal rate and regular rhythm.      Pulses: Normal pulses.      Heart sounds: Murmur heard.      Gallop present. S4 sounds present.   Pulmonary:      Effort: Pulmonary effort is normal.      Breath sounds: Normal breath sounds.   Abdominal:      General: There is distension.      Palpations: Abdomen is soft.      Tenderness: There is no abdominal tenderness.   Musculoskeletal:         General: Normal range of motion.      Cervical back: Neck supple.      Right lower leg: Edema present.      Left lower leg: Edema present.   Skin:     General: Skin is warm and dry.      Capillary Refill: Capillary refill  "takes less than 2 seconds.   Neurological:      General: No focal deficit present.      Mental Status: She is alert and oriented to person, place, and time.   Psychiatric:         Mood and Affect: Mood normal.         Behavior: Behavior normal.          Significant Labs: CMP   Recent Labs   Lab 08/01/24  0530 08/01/24  1441 08/02/24  0248    140 139   K 2.8* 3.3* 3.8   CL 90* 93* 92*   CO2 39* 37* 35*   GLU 84 97 109   BUN 17 17 18   CREATININE 0.9 0.9 0.9   CALCIUM 9.0 9.1 9.1   PROT  --   --  6.0   ALBUMIN 2.3* 2.2* 2.2*  2.2*   BILITOT  --   --  1.2*   ALKPHOS  --   --  119   AST  --   --  18   ALT  --   --  5*   ANIONGAP 10 10 12    and Troponin No results for input(s): "TROPONINI" in the last 48 hours.    Significant Imaging: Echocardiogram: Transthoracic echo (TTE) complete (Cupid Only):   Results for orders placed or performed during the hospital encounter of 07/26/24   Echo   Result Value Ref Range    BSA 2.31 m2    LVOT stroke volume 78.59 cm3    LVIDd 5.23 3.5 - 6.0 cm    LV Systolic Volume 49.86 mL    LV Systolic Volume Index 22.7 mL/m2    LVIDs 3.47 2.1 - 4.0 cm    LV Diastolic Volume 131.05 mL    LV Diastolic Volume Index 59.57 mL/m2    IVS 0.76 0.6 - 1.1 cm    LVOT diameter 2.0 cm    LVOT area 3.1 cm2    FS 34 28 - 44 %    Left Ventricle Relative Wall Thickness 0.31 cm    Posterior Wall 0.81 0.6 - 1.1 cm    LV mass 143.19 g    LV Mass Index 65 g/m2    MV Peak E Eulalio 1.06 m/s    TDI LATERAL 0.14 m/s    TDI SEPTAL 0.07 m/s    E/E' ratio 10.10 m/s    TR Max Eulalio 3.58 m/s    IVRT 85.63 msec    E wave deceleration time 217.62 msec    LV SEPTAL E/E' RATIO 15.14 m/s    LA Volume Index 52.9 mL/m2    LV LATERAL E/E' RATIO 7.57 m/s    LA volume 116.34 cm3    LVOT peak eulalio 1.10 m/s    RV- trejo basal diam 5.2 cm    TAPSE 3.10 cm    LA size 4.93 cm    Left Atrium Minor Axis 6.11 cm    Left Atrium Major Axis 6.12 cm    LA volume (mod) 92.76 cm3    LA WIDTH 4.54 cm    LA Volume Index (Mod) 42.2 mL/m2    RA Major " Axis 5.2 cm    RA Width 4.3 cm    AV mean gradient 13 mmHg    AV peak gradient 23 mmHg    Ao peak carlee 2.39 m/s    Ao VTI 48.12 cm    LVOT peak VTI 25.03 cm    AV valve area 1.63 cm²    AV Velocity Ratio 0.46     AV index (prosthetic) 0.52     LENCHO by Velocity Ratio 1.45 cm²    MV stenosis pressure 1/2 time 63.11 ms    MV valve area p 1/2 method 3.49 cm2    Triscuspid Valve Regurgitation Peak Gradient 51 mmHg    Sinus 2.44 cm    STJ 2.26 cm    Ascending aorta 2.92 cm    Mean e' 0.11 m/s    ZLVIDS -2.17     ZLVIDD -3.62     TV resting pulmonary artery pressure 54 mmHg    RV TB RVSP 7 mmHg    Est. RA pres 3 mmHg    Narrative      Left Ventricle: The left ventricle is normal in size. Normal wall   thickness. Normal wall motion. There is normal systolic function with a   visually estimated ejection fraction of 60 - 65%. There is indeterminate   diastolic function.    Right Ventricle: Moderate right ventricular enlargement. Systolic   function is borderline low.    Left Atrium: Left atrium is severely dilated.    Right Atrium: Right atrium is dilated. Catheter present in the right   atrium.    Aortic Valve: The aortic valve is a trileaflet valve. There is mild   aortic valve sclerosis. There is mild annular calcification present. There   is mild stenosis. Aortic valve area by VTI is 1.63 cm². Aortic valve peak   velocity is 2.39 m/s. Mean gradient is 13 mmHg. The dimensionless index is   0.52.    Pulmonary Artery: There is moderate pulmonary hypertension. The   estimated pulmonary artery systolic pressure is 54 mmHg.    IVC/SVC: Normal venous pressure at 3 mmHg.    Ascites present.       Assessment and Plan:     Volume overload  Liver US with doppler ordered with evidence of cirrhotic liver morphology with portal HTN, splenomegaly and moderate volume ascites. Last seen in hepatology clinic by Dr. Gutierres in 4/2023 with plans for TJ liver biopsy with pressure measurements, but seems patient was lost to follow up. LFTs and  ALP wnl. Tbili today slightly elevated at 1.2 with direct bili 0.8.     Ascitic fluid studies with initial SAAG was 1.0 with total ascitic protein of 3.2. Repeat paracentesis with removal of 10L on 7/30 had SAAG of 1.5 with total ascitic protein of 2.9. Consistent with exudative fluid, as protein >2.5 g. If overload in setting of heart failure, would expect ascitic fluid to be transudative.    , troponin <0.006. EKG shows RBBB. Has been receiving IV furosemide 80 mg BID. UOP documented at 700 mL, though accuracy of documentation questionable as pt reported frequent urination and significant improvement in peripheral edema with diuresis. Pt did state to feel as if she was in afib all day yesterday. She is currently compliant on metoprolol succinate 100 mg and nifedipine 60 mg for rate control at home. Also s/p watchman placement in 2023. 7/28 TTE with EF 60-65%, moderate RV enlargement, LA severe dilation, CVP 3 mmHg, moderate pulmonary hypertension with pulmonary artery systolic pressure 54 mmHg, and mild AS. S4 heart sound on exam, likely in setting of pulmonary HTN. Given low CVP, not convinced volume overload is d/t heart failure. Recommend right heart catheterization on Monday, 8/5, for better diagnostic evaluation of the heart.        VTE Risk Mitigation (From admission, onward)           Ordered     heparin (porcine) injection 7,500 Units  Every 8 hours         07/29/24 1202     IP VTE HIGH RISK PATIENT  Once         07/27/24 0351     Place sequential compression device  Until discontinued         07/27/24 0351                    Thank you for your consult. I will follow-up with patient. Please contact us if you have any additional questions.    Krystyna Livingston, DO  Cardiology   [facility] ([place])

## 2024-08-03 NOTE — SUBJECTIVE & OBJECTIVE
Past Medical History:   Diagnosis Date    Anticoagulant long-term use     Arthritis     Atrial fibrillation     cardioversion    Atrial fibrillation with RVR     HAS WATCHMAN IN PLACE NOW    Avascular necrosis     L hand    CHF (congestive heart failure)     Chronic fatigue     Depression     Diabetes mellitus     Encounter for blood transfusion 07/22/2020    Encounter for blood transfusion 03/2022    Fatty liver     GERD (gastroesophageal reflux disease)     Hx of psychiatric care     Hypertension     Iron deficiency anemia 05/07/2022    TIBC 444 with saturated iron 8    Liver disease     Obese     Pre-diabetes 05/07/2022    Psychiatric problem     Sleep apnea     wears cpap    SOB (shortness of breath) on exertion     Weight loss     75lb intentional weight loss       Past Surgical History:   Procedure Laterality Date    ABLATION OF ARRHYTHMOGENIC FOCUS FOR ATRIAL FIBRILLATION N/A 07/20/2020    Procedure: Ablation atrial fibrillation;  Surgeon: Gabriel Hawley MD;  Location: [facility] EP LAB;  Service: Cardiology;  Laterality: N/A;  afib, PVI, RFA, REENA, SHADY, anes, MB, 3 Prep    ABLATION OF ARRHYTHMOGENIC FOCUS FOR ATRIAL FIBRILLATION N/A 01/24/2022    Procedure: Ablation atrial fibrillation;  Surgeon: Gabriel Hawley MD;  Location: [facility] EP LAB;  Service: Cardiology;  Laterality: N/A;  afib/afl, PVI (re-do)/CTI, RFA, REENA (cx if SR), SHADY, anes, MB, 3 Prep    APPLICATION OF WOUND VACUUM-ASSISTED CLOSURE DEVICE Left 08/03/2020    Procedure: APPLICATION, WOUND VAC;  Surgeon: SEMAJ Márquez III, MD;  [facility];  Service: Peripheral Vascular;  Laterality: Left;    APPLICATION OF WOUND VACUUM-ASSISTED CLOSURE DEVICE Left 08/06/2020    Procedure: APPLICATION, WOUND VAC;  Surgeon: SEMAJ Márquez III, MD;  [facility];  Service: Peripheral Vascular;  Laterality: Left;    CARPAL TUNNEL RELEASE Right 06/10/2020    Procedure: RELEASE, CARPAL TUNNEL - RIGHT;  Surgeon: Adelaida Hall MD;   Location: [facility] OR;  Service: Orthopedics;  Laterality: Right;  GENERAL AND REGIONAL    CARPAL TUNNEL RELEASE Left 05/05/2021    Procedure: RELEASE, CARPAL TUNNEL, LEFT;  Surgeon: Adelaida Hall MD;  Location: [facility] OR;  Service: Orthopedics;  Laterality: Left;  GENERAL & REGIONAL IN PACU    CARPECTOMY Left 9/6/2023    Procedure: CARPECTOMY;  Surgeon: Adelaida Hall MD;  Location: [facility] OR;  Service: Orthopedics;  Laterality: Left;  MAC/REGIONAL  LEFT PROXIMAL ROW    CLOSURE OF WOUND Left 08/06/2020    Procedure: CLOSURE, WOUND;  Surgeon: SEMAJ Máruqez III, MD;  [facility] 2ND F[facility];  Service: Peripheral Vascular;  Laterality: Left;  Complex    COLONOSCOPY N/A 08/17/2021    Procedure: COLONOSCOPY Suprep;  Surgeon: Loco Deluca MD;  Location: [facility] ENDO;  Service: Endoscopy;  Laterality: N/A;    ECHOCARDIOGRAM,TRANSESOPHAGEAL N/A 07/24/2023    Procedure: Transesophageal echo (REENA) intra-procedure log documentation;  Surgeon: Leyla Diagnostic Provider;  Location: [facility] LAB;  Service: Cardiology;  Laterality: N/A;  s/p WM, REENA, anes, 3 Prep    ESOPHAGOGASTRODUODENOSCOPY N/A 08/17/2021    Procedure: EGD (ESOPHAGOGASTRODUODENOSCOPY);  Surgeon: Loco Deluca MD;  Location: [facility] ENDO;  Service: Endoscopy;  Laterality: N/A;  Patient is schedule to have her Covid test on 08/14/2021 at the [facility] @ 9:30am. [place].    EXPLORATION OF FEMORAL ARTERY Left 07/21/2020    Procedure: EXPLORATION, ARTERY, FEMORAL;  Surgeon: SEMAJ Márquez III, MD;  [facility] 2ND F[facility];  Service: Peripheral Vascular;  Laterality: Left;  1. Urgent Direct repair, L SFA branch laceration    FOOT SURGERY      HYSTERECTOMY      HYSTEROSCOPY WITH DILATION AND CURETTAGE OF UTERUS N/A 02/19/2022    Procedure: HYSTEROSCOPY, WITH DILATION AND CURETTAGE OF UTERUS;  Surgeon: Shane Palomo MD;  Location: [facility] 2ND FLR;  Service: OB/GYN;  Laterality: N/A;    INCISION AND DRAINAGE OF KNEE Left 05/12/2022     Procedure: INCISION AND DRAINAGE, Prepatellar bursectomy.;  Surgeon: Dre Guzmán MD;  Location: [facility] OR 2ND FLR;  Service: Orthopedics;  Laterality: Left;    LEFT HEART CATHETERIZATION Left 02/07/2023    Procedure: Left heart cath;  Surgeon: Josiah Terry MD;  Location: [facility] CATH LAB;  Service: Cardiology;  Laterality: Left;  borderline moderate bleeding risk 6.3%    OCCLUSION OF LEFT ATRIAL APPENDAGE N/A 06/12/2023    Procedure: Left atrial appendage occlusion;  Surgeon: Gabriel Hawley MD;  Location: [facility] EP LAB;  Service: Cardiology;  Laterality: N/A;  afib, watchman, BSCI, demi, ALIYA ibarra, 3prep    RELEASE OF ULNAR NERVE AT CUBITAL TUNNEL Bilateral     ROBOT-ASSISTED LAPAROSCOPIC ABDOMINAL HYSTERECTOMY USING DA KEIRY XI N/A 08/09/2022    Procedure: XI ROBOTIC HYSTERECTOMY;  Surgeon: Yolanda Barriga MD;  Location: [facility];  Service: OB/GYN;  Laterality: N/A;  dual console requested    ROBOT-ASSISTED LAPAROSCOPIC SALPINGO-OOPHORECTOMY Bilateral 08/09/2022    Procedure: ROBOTIC SALPINGO-OOPHORECTOMY;  Surgeon: Yolanda Barriga MD;  Location: [facility];  Service: OB/GYN;  Laterality: Bilateral;    TRANSESOPHAGEAL ECHOCARDIOGRAPHY N/A 06/12/2023    Procedure: ECHOCARDIOGRAM, TRANSESOPHAGEAL;  Surgeon: Cyril Mast MD;  Location: [facility] EP LAB;  Service: Cardiology;  Laterality: N/A;    TREATMENT OF CARDIAC ARRHYTHMIA N/A 01/29/2020    Procedure: CARDIOVERSION;  Surgeon: Gabriel Hawley MD;  Location: [facility] EP LAB;  Service: Cardiology;  Laterality: N/A;  af, demi, dccv, fred, mb, 345    TREATMENT OF CARDIAC ARRHYTHMIA  01/24/2022    Procedure: Cardioversion or Defibrillation;  Surgeon: Gabriel Hawley MD;  Location: [facility] EP LAB;  Service: Cardiology;;    WOUND DEBRIDEMENT Left 08/06/2020    Procedure: DEBRIDEMENT, WOUND;  Surgeon: SEMAJ Márquez III, MD;  Location: [facility] OR 2ND FLR;  Service: Peripheral Vascular;  Laterality: Left;       Review of patient's allergies indicates:   Allergen  Reactions    Flecainide Shortness Of Breath and Swelling    Shellfish containing products Other (See Comments)     Makes gout terrible    Vancomycin analogues Hives, Itching and Rash       Current Facility-Administered Medications on File Prior to Encounter   Medication    mupirocin 2 % ointment     Current Outpatient Medications on File Prior to Encounter   Medication Sig    acetaminophen (TYLENOL) 500 MG tablet Take 2 tablets (1,000 mg total) by mouth 2 (two) times daily as needed for Pain. Begin post op day 3. (Patient taking differently: Take 1,000 mg by mouth every evening. Begin post op day 3.)    albuterol (VENTOLIN HFA) 90 mcg/actuation inhaler Inhale 2 puffs into the lungs every 6 (six) hours as needed for Wheezing. Rescue (Patient taking differently: Inhale 2 puffs into the lungs daily as needed for Wheezing or Shortness of Breath. Rescue)    ALPRAZolam (XANAX) 0.5 MG tablet Take 1 tablet (0.5 mg total) by mouth 2 (two) times daily as needed for Anxiety. (Patient taking differently: Take 0.5 mg by mouth daily as needed for Anxiety.)    aspirin (ECOTRIN) 81 MG EC tablet Take 81 mg by mouth once daily.    atorvastatin (LIPITOR) 80 MG tablet Take 1 tablet (80 mg total) by mouth once daily. (Patient taking differently: Take 80 mg by mouth every evening.)    b complex vitamins capsule Take 1 capsule by mouth every other day.    colchicine (COLCRYS) 0.6 mg tablet For gout attack: Take TWO tablets by mouth, then, 2 hours later, take ONE additional tablet. Then take 1 tablet twice daily only if still needed for gout pain. (Patient taking differently: Take 0.6 mg by mouth daily as needed (For gout attack: Take TWO tablets by mouth, then, 2 hours later, take ONE additional tablet. Then take 1 tablet twice daily only if still needed for gout pain.).)    DULoxetine (CYMBALTA) 60 MG capsule Take 2 capsules (120 mg total) by mouth once daily.    lisinopriL (PRINIVIL,ZESTRIL) 40 MG tablet Take 1 tablet (40 mg total) by  mouth once daily.    metoprolol succinate (TOPROL-XL) 100 MG 24 hr tablet Take 1 tablet (100 mg total) by mouth 2 (two) times daily.    multivitamin (THERAGRAN) per tablet Take 1 tablet by mouth once daily.    NIFEdipine (PROCARDIA-XL) 60 MG (OSM) 24 hr tablet Take 1 tablet (60 mg total) by mouth once daily.    omeprazole (PRILOSEC) 20 MG capsule TAKE 1 CAPSULE BY MOUTH ONCE DAILY (Patient taking differently: Take 20 mg by mouth daily as needed (hearturn/ indegestion).)    [DISCONTINUED] medroxyPROGESTERone (PROVERA) 10 MG tablet Take 2 tablets (20 mg total) by mouth 3 (three) times daily.    [DISCONTINUED] pantoprazole (PROTONIX) 40 MG tablet Take 1 tablet (40 mg total) by mouth once daily. (Patient not taking: Reported on 2022)     Family History       Problem Relation (Age of Onset)    Cataracts Mother    Diabetes Father    Hypertension Mother, Father, Sister, Brother    Thyroid disease Mother          Tobacco Use    Smoking status: Former     Current packs/day: 0.00     Types: Cigarettes     Quit date: 2019     Years since quittin.0    Smokeless tobacco: Never   Substance and Sexual Activity    Alcohol use: No    Drug use: No    Sexual activity: Not Currently     Partners: Male     Birth control/protection: See Surgical Hx     Review of Systems   Constitutional: Positive for weight gain. Negative for chills and fever.   Cardiovascular:  Positive for chest pain, dyspnea on exertion, leg swelling, orthopnea and palpitations.   Respiratory:  Positive for shortness of breath. Negative for cough.    Gastrointestinal:  Negative for nausea and vomiting.   Neurological:  Negative for dizziness and headaches.     Objective:     Vital Signs (Most Recent):  Temp: 98.4 °F (36.9 °C) (24)  Pulse: 71 (24 001)  Resp: 18 (24)  BP: 105/64 (24)  SpO2: 96 % (24) Vital Signs (24h Range):  Temp:  [97.7 °F (36.5 °C)-98.4 °F (36.9 °C)] 98.4 °F (36.9 °C)  Pulse:  [71-96]  71  Resp:  [17-23] 18  SpO2:  [95 %-97 %] 96 %  BP: ()/(62-72) 105/64     Weight: 101.1 kg (222 lb 14.2 oz)  Body mass index is 37.09 kg/m².    SpO2: 96 %       No intake or output data in the 24 hours ending 08/03/24 0057    Lines/Drains/Airways       Drain  Duration             Female External Urinary Catheter w/ Suction 07/27/24 0035 7 days              Peripheral Intravenous Line  Duration                  Peripheral IV - Single Lumen 08/01/24 1053 20 G Distal;Left;Posterior Forearm 1 day         Peripheral IV - Single Lumen 08/01/24 1105 20 G 1 3/4 in Anterior;Left Upper Arm 1 day                     Physical Exam  Constitutional:       General: She is not in acute distress.     Appearance: She is obese.   HENT:      Head: Normocephalic and atraumatic.      Mouth/Throat:      Pharynx: Oropharynx is clear.   Eyes:      Extraocular Movements: Extraocular movements intact.      Pupils: Pupils are equal, round, and reactive to light.   Cardiovascular:      Rate and Rhythm: Normal rate and regular rhythm.      Pulses: Normal pulses.      Heart sounds: Murmur heard.      Gallop present. S4 sounds present.   Pulmonary:      Effort: Pulmonary effort is normal.      Breath sounds: Normal breath sounds.   Abdominal:      General: There is distension.      Palpations: Abdomen is soft.      Tenderness: There is no abdominal tenderness.   Musculoskeletal:         General: Normal range of motion.      Cervical back: Neck supple.      Right lower leg: Edema present.      Left lower leg: Edema present.   Skin:     General: Skin is warm and dry.      Capillary Refill: Capillary refill takes less than 2 seconds.   Neurological:      General: No focal deficit present.      Mental Status: She is alert and oriented to person, place, and time.   Psychiatric:         Mood and Affect: Mood normal.         Behavior: Behavior normal.          Significant Labs: CMP   Recent Labs   Lab 08/01/24  0530 08/01/24  1441 08/02/24  0248  "   140 139   K 2.8* 3.3* 3.8   CL 90* 93* 92*   CO2 39* 37* 35*   GLU 84 97 109   BUN 17 17 18   CREATININE 0.9 0.9 0.9   CALCIUM 9.0 9.1 9.1   PROT  --   --  6.0   ALBUMIN 2.3* 2.2* 2.2*  2.2*   BILITOT  --   --  1.2*   ALKPHOS  --   --  119   AST  --   --  18   ALT  --   --  5*   ANIONGAP 10 10 12    and Troponin No results for input(s): "TROPONINI" in the last 48 hours.    Significant Imaging: Echocardiogram: Transthoracic echo (TTE) complete (Cupid Only):   Results for orders placed or performed during the hospital encounter of 07/26/24   Echo   Result Value Ref Range    BSA 2.31 m2    LVOT stroke volume 78.59 cm3    LVIDd 5.23 3.5 - 6.0 cm    LV Systolic Volume 49.86 mL    LV Systolic Volume Index 22.7 mL/m2    LVIDs 3.47 2.1 - 4.0 cm    LV Diastolic Volume 131.05 mL    LV Diastolic Volume Index 59.57 mL/m2    IVS 0.76 0.6 - 1.1 cm    LVOT diameter 2.0 cm    LVOT area 3.1 cm2    FS 34 28 - 44 %    Left Ventricle Relative Wall Thickness 0.31 cm    Posterior Wall 0.81 0.6 - 1.1 cm    LV mass 143.19 g    LV Mass Index 65 g/m2    MV Peak E Eulalio 1.06 m/s    TDI LATERAL 0.14 m/s    TDI SEPTAL 0.07 m/s    E/E' ratio 10.10 m/s    TR Max Eulalio 3.58 m/s    IVRT 85.63 msec    E wave deceleration time 217.62 msec    LV SEPTAL E/E' RATIO 15.14 m/s    LA Volume Index 52.9 mL/m2    LV LATERAL E/E' RATIO 7.57 m/s    LA volume 116.34 cm3    LVOT peak eulalio 1.10 m/s    RV- trejo basal diam 5.2 cm    TAPSE 3.10 cm    LA size 4.93 cm    Left Atrium Minor Axis 6.11 cm    Left Atrium Major Axis 6.12 cm    LA volume (mod) 92.76 cm3    LA WIDTH 4.54 cm    LA Volume Index (Mod) 42.2 mL/m2    RA Major Axis 5.2 cm    RA Width 4.3 cm    AV mean gradient 13 mmHg    AV peak gradient 23 mmHg    Ao peak eulalio 2.39 m/s    Ao VTI 48.12 cm    LVOT peak VTI 25.03 cm    AV valve area 1.63 cm²    AV Velocity Ratio 0.46     AV index (prosthetic) 0.52     LENCHO by Velocity Ratio 1.45 cm²    MV stenosis pressure 1/2 time 63.11 ms    MV valve area p 1/2 " method 3.49 cm2    Triscuspid Valve Regurgitation Peak Gradient 51 mmHg    Sinus 2.44 cm    STJ 2.26 cm    Ascending aorta 2.92 cm    Mean e' 0.11 m/s    ZLVIDS -2.17     ZLVIDD -3.62     TV resting pulmonary artery pressure 54 mmHg    RV TB RVSP 7 mmHg    Est. RA pres 3 mmHg    Narrative      Left Ventricle: The left ventricle is normal in size. Normal wall   thickness. Normal wall motion. There is normal systolic function with a   visually estimated ejection fraction of 60 - 65%. There is indeterminate   diastolic function.    Right Ventricle: Moderate right ventricular enlargement. Systolic   function is borderline low.    Left Atrium: Left atrium is severely dilated.    Right Atrium: Right atrium is dilated. Catheter present in the right   atrium.    Aortic Valve: The aortic valve is a trileaflet valve. There is mild   aortic valve sclerosis. There is mild annular calcification present. There   is mild stenosis. Aortic valve area by VTI is 1.63 cm². Aortic valve peak   velocity is 2.39 m/s. Mean gradient is 13 mmHg. The dimensionless index is   0.52.    Pulmonary Artery: There is moderate pulmonary hypertension. The   estimated pulmonary artery systolic pressure is 54 mmHg.    IVC/SVC: Normal venous pressure at 3 mmHg.    Ascites present.

## 2024-08-04 LAB
ALBUMIN SERPL BCP-MCNC: 2.1 G/DL (ref 3.5–5.2)
ALP SERPL-CCNC: 181 U/L (ref 55–135)
ALT SERPL W/O P-5'-P-CCNC: 6 U/L (ref 10–44)
ANION GAP SERPL CALC-SCNC: 12 MMOL/L (ref 8–16)
AST SERPL-CCNC: 31 U/L (ref 10–40)
BASOPHILS # BLD AUTO: 0.02 K/UL (ref 0–0.2)
BASOPHILS NFR BLD: 0.2 % (ref 0–1.9)
BILIRUB SERPL-MCNC: 1.2 MG/DL (ref 0.1–1)
BUN SERPL-MCNC: 27 MG/DL (ref 6–20)
CALCIUM SERPL-MCNC: 9.4 MG/DL (ref 8.7–10.5)
CHLORIDE SERPL-SCNC: 90 MMOL/L (ref 95–110)
CO2 SERPL-SCNC: 34 MMOL/L (ref 23–29)
CREAT SERPL-MCNC: 1.2 MG/DL (ref 0.5–1.4)
DIFFERENTIAL METHOD BLD: ABNORMAL
EOSINOPHIL # BLD AUTO: 0.1 K/UL (ref 0–0.5)
EOSINOPHIL NFR BLD: 1.5 % (ref 0–8)
ERYTHROCYTE [DISTWIDTH] IN BLOOD BY AUTOMATED COUNT: 14.3 % (ref 11.5–14.5)
EST. GFR  (NO RACE VARIABLE): 51.8 ML/MIN/1.73 M^2
GLUCOSE SERPL-MCNC: 83 MG/DL (ref 70–110)
HCT VFR BLD AUTO: 34 % (ref 37–48.5)
HGB BLD-MCNC: 10.3 G/DL (ref 12–16)
IMM GRANULOCYTES # BLD AUTO: 0.03 K/UL (ref 0–0.04)
IMM GRANULOCYTES NFR BLD AUTO: 0.3 % (ref 0–0.5)
LYMPHOCYTES # BLD AUTO: 0.5 K/UL (ref 1–4.8)
LYMPHOCYTES NFR BLD: 5.7 % (ref 18–48)
MAGNESIUM SERPL-MCNC: 2.1 MG/DL (ref 1.6–2.6)
MCH RBC QN AUTO: 27.5 PG (ref 27–31)
MCHC RBC AUTO-ENTMCNC: 30.3 G/DL (ref 32–36)
MCV RBC AUTO: 91 FL (ref 82–98)
MONOCYTES # BLD AUTO: 1.2 K/UL (ref 0.3–1)
MONOCYTES NFR BLD: 12.3 % (ref 4–15)
NEUTROPHILS # BLD AUTO: 7.5 K/UL (ref 1.8–7.7)
NEUTROPHILS NFR BLD: 80 % (ref 38–73)
NRBC BLD-RTO: 0 /100 WBC
PLATELET # BLD AUTO: 204 K/UL (ref 150–450)
PMV BLD AUTO: 9.9 FL (ref 9.2–12.9)
POTASSIUM SERPL-SCNC: 4.2 MMOL/L (ref 3.5–5.1)
PROT SERPL-MCNC: 6.4 G/DL (ref 6–8.4)
RBC # BLD AUTO: 3.75 M/UL (ref 4–5.4)
SODIUM SERPL-SCNC: 136 MMOL/L (ref 136–145)
WBC # BLD AUTO: 9.42 K/UL (ref 3.9–12.7)

## 2024-08-04 PROCEDURE — 63600175 PHARM REV CODE 636 W HCPCS

## 2024-08-04 PROCEDURE — 25000003 PHARM REV CODE 250

## 2024-08-04 PROCEDURE — 36415 COLL VENOUS BLD VENIPUNCTURE: CPT | Performed by: STUDENT IN AN ORGANIZED HEALTH CARE EDUCATION/TRAINING PROGRAM

## 2024-08-04 PROCEDURE — 99232 SBSQ HOSP IP/OBS MODERATE 35: CPT | Mod: ,,, | Performed by: INTERNAL MEDICINE

## 2024-08-04 PROCEDURE — 80053 COMPREHEN METABOLIC PANEL: CPT | Performed by: STUDENT IN AN ORGANIZED HEALTH CARE EDUCATION/TRAINING PROGRAM

## 2024-08-04 PROCEDURE — 85025 COMPLETE CBC W/AUTO DIFF WBC: CPT

## 2024-08-04 PROCEDURE — 20600001 HC STEP DOWN PRIVATE ROOM

## 2024-08-04 PROCEDURE — 83735 ASSAY OF MAGNESIUM: CPT

## 2024-08-04 RX ORDER — LOPERAMIDE HYDROCHLORIDE 2 MG/1
2 CAPSULE ORAL ONCE
Status: COMPLETED | OUTPATIENT
Start: 2024-08-04 | End: 2024-08-04

## 2024-08-04 RX ADMIN — HEPARIN SODIUM 7500 UNITS: 5000 INJECTION INTRAVENOUS; SUBCUTANEOUS at 05:08

## 2024-08-04 RX ADMIN — ATORVASTATIN CALCIUM 80 MG: 40 TABLET, FILM COATED ORAL at 09:08

## 2024-08-04 RX ADMIN — ALPRAZOLAM 0.25 MG: 0.25 TABLET ORAL at 09:08

## 2024-08-04 RX ADMIN — Medication 3 MG: at 09:08

## 2024-08-04 RX ADMIN — SPIRONOLACTONE 50 MG: 25 TABLET ORAL at 09:08

## 2024-08-04 RX ADMIN — ASPIRIN 81 MG: 81 TABLET, COATED ORAL at 09:08

## 2024-08-04 RX ADMIN — ALPRAZOLAM 0.25 MG: 0.25 TABLET ORAL at 11:08

## 2024-08-04 RX ADMIN — ACETAMINOPHEN 650 MG: 325 TABLET ORAL at 09:08

## 2024-08-04 RX ADMIN — METOPROLOL SUCCINATE 100 MG: 100 TABLET, EXTENDED RELEASE ORAL at 09:08

## 2024-08-04 RX ADMIN — PANTOPRAZOLE SODIUM 40 MG: 40 TABLET, DELAYED RELEASE ORAL at 09:08

## 2024-08-04 RX ADMIN — DULOXETINE HYDROCHLORIDE 60 MG: 30 CAPSULE, DELAYED RELEASE ORAL at 09:08

## 2024-08-04 RX ADMIN — HEPARIN SODIUM 7500 UNITS: 5000 INJECTION INTRAVENOUS; SUBCUTANEOUS at 03:08

## 2024-08-04 RX ADMIN — HEPARIN SODIUM 7500 UNITS: 5000 INJECTION INTRAVENOUS; SUBCUTANEOUS at 09:08

## 2024-08-04 RX ADMIN — LOPERAMIDE HYDROCHLORIDE 2 MG: 2 CAPSULE ORAL at 04:08

## 2024-08-04 NOTE — ASSESSMENT & PLAN NOTE
Seen by Dr. Saba with hepatology for previous chronic mild elevation in LFT, determined most likely NADLF. Now with cirrhotic morphology on CT. Previous EGD with portal gastropathy. LFT normal on admit. 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     - Liver US with Doppler  - Started on spironolactone 25mg and Lasix 80 BID ; currently holding diuretics due to dip in kidney function  -IR consulted; will perform a diagnostic liver biopsy

## 2024-08-04 NOTE — SUBJECTIVE & OBJECTIVE
Interval History: Pt reports increase in swelling of abdomen. She denies any changes in CP or SOB. No orthopnea.     Review of Systems   Constitutional: Positive for weight gain. Negative for chills and fever.   Cardiovascular:  Positive for chest pain, dyspnea on exertion, leg swelling, orthopnea and palpitations.   Respiratory:  Positive for shortness of breath. Negative for cough.    Gastrointestinal:  Positive for nausea and vomiting.   Neurological:  Negative for dizziness and headaches.     Objective:     Vital Signs (Most Recent):  Temp: 97.7 °F (36.5 °C) (08/04/24 1223)  Pulse: 64 (08/04/24 1223)  Resp: 18 (08/04/24 1223)  BP: 106/65 (08/04/24 1223)  SpO2: 100 % (08/04/24 1223) Vital Signs (24h Range):  Temp:  [97.4 °F (36.3 °C)-98.4 °F (36.9 °C)] 97.7 °F (36.5 °C)  Pulse:  [63-77] 64  Resp:  [18-20] 18  SpO2:  [87 %-100 %] 100 %  BP: (106-126)/(59-79) 106/65     Weight: 101 kg (222 lb 10.6 oz)  Body mass index is 37.05 kg/m².     SpO2: 100 %         Intake/Output Summary (Last 24 hours) at 8/4/2024 1447  Last data filed at 8/4/2024 0519  Gross per 24 hour   Intake 296.44 ml   Output 500 ml   Net -203.56 ml       Lines/Drains/Airways       Drain  Duration             Female External Urinary Catheter w/ Suction 07/27/24 0035 8 days              Peripheral Intravenous Line  Duration                  Peripheral IV - Single Lumen 08/01/24 1053 20 G Distal;Left;Posterior Forearm 3 days         Peripheral IV - Single Lumen 08/01/24 1105 20 G 1 3/4 in Anterior;Left Upper Arm 3 days                       Physical Exam  Constitutional:       General: She is not in acute distress.     Appearance: She is obese.   HENT:      Head: Normocephalic and atraumatic.      Mouth/Throat:      Pharynx: Oropharynx is clear.   Eyes:      Extraocular Movements: Extraocular movements intact.      Pupils: Pupils are equal, round, and reactive to light.   Cardiovascular:      Rate and Rhythm: Normal rate and regular rhythm.       Pulses: Normal pulses.      Heart sounds: Murmur heard.      Gallop present. S4 sounds present.   Pulmonary:      Effort: Pulmonary effort is normal.      Breath sounds: Normal breath sounds.   Abdominal:      General: There is distension.      Palpations: Abdomen is soft.      Tenderness: There is no abdominal tenderness.   Musculoskeletal:         General: Normal range of motion.      Cervical back: Neck supple.      Right lower leg: Edema present.      Left lower leg: Edema present.   Skin:     General: Skin is warm and dry.      Capillary Refill: Capillary refill takes less than 2 seconds.   Neurological:      General: No focal deficit present.      Mental Status: She is alert and oriented to person, place, and time.   Psychiatric:         Mood and Affect: Mood normal.         Behavior: Behavior normal.            Significant Labs: CMP   Recent Labs   Lab 08/03/24  0551 08/04/24  0550    136   K 4.1 4.2   CL 93* 90*   CO2 38* 34*   GLU 93 83   BUN 23* 27*   CREATININE 0.9 1.2   CALCIUM 9.1 9.4   PROT 6.2 6.4   ALBUMIN 2.1* 2.1*   BILITOT 1.1* 1.2*   ALKPHOS 138* 181*   AST 26 31   ALT <5* 6*   ANIONGAP 8 12       Significant Imaging: Echocardiogram: Transthoracic echo (TTE) complete (Cupid Only):   Results for orders placed or performed during the hospital encounter of 07/26/24   Echo   Result Value Ref Range    BSA 2.31 m2    LVOT stroke volume 78.59 cm3    LVIDd 5.23 3.5 - 6.0 cm    LV Systolic Volume 49.86 mL    LV Systolic Volume Index 22.7 mL/m2    LVIDs 3.47 2.1 - 4.0 cm    LV Diastolic Volume 131.05 mL    LV Diastolic Volume Index 59.57 mL/m2    IVS 0.76 0.6 - 1.1 cm    LVOT diameter 2.0 cm    LVOT area 3.1 cm2    FS 34 28 - 44 %    Left Ventricle Relative Wall Thickness 0.31 cm    Posterior Wall 0.81 0.6 - 1.1 cm    LV mass 143.19 g    LV Mass Index 65 g/m2    MV Peak E Eulalio 1.06 m/s    TDI LATERAL 0.14 m/s    TDI SEPTAL 0.07 m/s    E/E' ratio 10.10 m/s    TR Max Eulalio 3.58 m/s    IVRT 85.63 msec    E  wave deceleration time 217.62 msec    LV SEPTAL E/E' RATIO 15.14 m/s    LA Volume Index 52.9 mL/m2    LV LATERAL E/E' RATIO 7.57 m/s    LA volume 116.34 cm3    LVOT peak carlee 1.10 m/s    RV- trejo basal diam 5.2 cm    TAPSE 3.10 cm    LA size 4.93 cm    Left Atrium Minor Axis 6.11 cm    Left Atrium Major Axis 6.12 cm    LA volume (mod) 92.76 cm3    LA WIDTH 4.54 cm    LA Volume Index (Mod) 42.2 mL/m2    RA Major Axis 5.2 cm    RA Width 4.3 cm    AV mean gradient 13 mmHg    AV peak gradient 23 mmHg    Ao peak carlee 2.39 m/s    Ao VTI 48.12 cm    LVOT peak VTI 25.03 cm    AV valve area 1.63 cm²    AV Velocity Ratio 0.46     AV index (prosthetic) 0.52     LENCHO by Velocity Ratio 1.45 cm²    MV stenosis pressure 1/2 time 63.11 ms    MV valve area p 1/2 method 3.49 cm2    Triscuspid Valve Regurgitation Peak Gradient 51 mmHg    Sinus 2.44 cm    STJ 2.26 cm    Ascending aorta 2.92 cm    Mean e' 0.11 m/s    ZLVIDS -2.17     ZLVIDD -3.62     TV resting pulmonary artery pressure 54 mmHg    RV TB RVSP 7 mmHg    Est. RA pres 3 mmHg    Narrative      Left Ventricle: The left ventricle is normal in size. Normal wall   thickness. Normal wall motion. There is normal systolic function with a   visually estimated ejection fraction of 60 - 65%. There is indeterminate   diastolic function.    Right Ventricle: Moderate right ventricular enlargement. Systolic   function is borderline low.    Left Atrium: Left atrium is severely dilated.    Right Atrium: Right atrium is dilated. Catheter present in the right   atrium.    Aortic Valve: The aortic valve is a trileaflet valve. There is mild   aortic valve sclerosis. There is mild annular calcification present. There   is mild stenosis. Aortic valve area by VTI is 1.63 cm². Aortic valve peak   velocity is 2.39 m/s. Mean gradient is 13 mmHg. The dimensionless index is   0.52.    Pulmonary Artery: There is moderate pulmonary hypertension. The   estimated pulmonary artery systolic pressure is 54  mmHg.    IVC/SVC: Normal venous pressure at 3 mmHg.    Ascites present.

## 2024-08-04 NOTE — ASSESSMENT & PLAN NOTE
Improved    Creatine stable for now. BMP reviewed- noted Estimated Creatinine Clearance: 58.7 mL/min (based on SCr of 1.2 mg/dL). according to latest data. Based on current GFR, CKD stage is stage 3 - GFR 30-59.  Monitor UOP and serial BMP and adjust therapy as needed. Renally dose meds. Avoid nephrotoxic medications and procedures.    Currently holding diuretics due to dip in kidney function

## 2024-08-04 NOTE — PLAN OF CARE
08/4-pt continues with multiple loose stools, pain and burning from skin breakdown. Pt has TBD Liver Biopsy and will have Heart Cath 08/5 will be NPO Carloz night. Increased abdominal distention with recurrent nausea and vomiting      Problem: Adult Inpatient Plan of Care  Goal: Plan of Care Review  Outcome: Progressing  Goal: Patient-Specific Goal (Individualized)  Outcome: Progressing  Goal: Absence of Hospital-Acquired Illness or Injury  Outcome: Progressing     Problem: Bariatric Environmental Safety  Goal: Safety Maintained with Care  Outcome: Progressing     Problem: Diabetes Comorbidity  Goal: Blood Glucose Level Within Targeted Range  Outcome: Progressing     Problem: Wound  Goal: Optimal Coping  Outcome: Progressing  Goal: Absence of Infection Signs and Symptoms  Outcome: Progressing  Goal: Improved Oral Intake  Outcome: Progressing     Problem: Liver Failure  Goal: Optimal Coping with Liver Failure  Outcome: Progressing  Goal: Blood Glucose Level Within Target Range  Outcome: Progressing  Goal: Optimal Coagulation Function  Outcome: Progressing  Goal: Absence of Infection Signs and Symptoms  Outcome: Progressing  Goal: Optimal Neurologic Function  Outcome: Progressing  Goal: Optimal Pain Control, Comfort and Function  Outcome: Progressing  Goal: Effective Oxygenation and Ventilation  Outcome: Progressing

## 2024-08-04 NOTE — SUBJECTIVE & OBJECTIVE
Interval History: Patient scheduled for right heart cath Monday as well as potentially a liver biopsy by IR. Patient did have another round of diarrhea last night reporting 3 bowel movements yesterday and 2 bowel movements overnight. Nurse reports that it did not look like C. Diff.     Review of Systems   Constitutional:  Negative for chills and diaphoresis.   Respiratory:  Positive for shortness of breath. Negative for cough.         SOB still present but improved; down to 2L   Cardiovascular:  Positive for leg swelling. Negative for chest pain and palpitations.   Gastrointestinal:  Positive for abdominal distention, abdominal pain and diarrhea. Negative for blood in stool, nausea and vomiting.        Patient reports 3 BM yesterday and an additional 2 overnight. No melena or hematochezia reported.     Some crampy pain right before BMs   Genitourinary:  Negative for difficulty urinating, dysuria and hematuria.   Musculoskeletal:  Positive for arthralgias.        Complains of pain in her legs. Patient fell before coming to the ED.    Neurological:  Negative for dizziness and headaches.     Objective:     Vital Signs (Most Recent):  Temp: 97.7 °F (36.5 °C) (08/04/24 1223)  Pulse: 64 (08/04/24 1223)  Resp: 18 (08/04/24 1223)  BP: 106/65 (08/04/24 1223)  SpO2: 100 % (08/04/24 1223) Vital Signs (24h Range):  Temp:  [97.4 °F (36.3 °C)-98.4 °F (36.9 °C)] 97.7 °F (36.5 °C)  Pulse:  [63-77] 64  Resp:  [18-20] 18  SpO2:  [87 %-100 %] 100 %  BP: (106-126)/(59-79) 106/65     Weight: 101 kg (222 lb 10.6 oz)  Body mass index is 37.05 kg/m².    Intake/Output Summary (Last 24 hours) at 8/4/2024 1516  Last data filed at 8/4/2024 0519  Gross per 24 hour   Intake 296.44 ml   Output 500 ml   Net -203.56 ml         Physical Exam  Constitutional:       General: She is not in acute distress.     Appearance: She is obese. She is not ill-appearing.   HENT:      Head: Normocephalic and atraumatic.   Eyes:      General: No scleral  icterus.  Cardiovascular:      Rate and Rhythm: Normal rate and regular rhythm.   Pulmonary:      Effort: Pulmonary effort is normal.      Breath sounds: Normal breath sounds.      Comments: Patient on 2 L nasal canula  Abdominal:      General: Bowel sounds are normal. There is distension.      Tenderness: There is no abdominal tenderness. There is no guarding or rebound.   Musculoskeletal:      Right lower leg: Edema present.      Left lower leg: Edema present.      Comments: Edema improved since admission. Slight pitting edema.  Patient not wearing compression stockings.    Skin:     General: Skin is warm and dry.      Coloration: Skin is not jaundiced.   Neurological:      Mental Status: She is alert and oriented to person, place, and time.   Psychiatric:         Mood and Affect: Mood normal.         Behavior: Behavior normal.             Significant Labs: All pertinent labs within the past 24 hours have been reviewed.    Significant Imaging: I have reviewed all pertinent imaging results/findings within the past 24 hours.

## 2024-08-04 NOTE — ASSESSMENT & PLAN NOTE
Cardiorenal syndrome  Acute HF exacerbation of uncertain etiology (no missed doses of lasix). Last TTE with EF 60% and G2DD. Concern that she may have new R-sided HF given anasarca (large volume ascites) versus now concomitant MASH cirrhosis compounding volume overloaded state and therefore needs higher diuresis at baseline. Suspect JHONATAN d.t overload - CRS.     Total 17L removed via para + 8L UOP  TTE with intermediate diastolic dysfunction with EF 60-65%, minor aortic stenosis, and moderate pulmonary hypertension with estimated pulmonary artery systolic pressure is 54 mmHg.    - Lasix 80mg IV BID and spironolactone 50mg; currently holding diuretics due to dip in kidney function  - RFP BID  - BIPAP qhs and during naps     - Fluid restriction at 1200 cc with strict I/Os and daily weights   - Check Electrolytes, keep Mag >2 & K+ >4  - Ambulate as tolerated

## 2024-08-04 NOTE — PROGRESS NOTES
[facility] ([place] [address])  [facility] Medicine  Progress Note    Patient Name: Vicky Alvarado  MRN: [id number]  Patient Class: IP- Inpatient   Admission Date: 7/26/2024  Length of Stay: 8 days  Attending Physician: Cory Hairston, *  Primary Care Provider: Gordy Cordero MD        Subjective:     Principal Problem:Acute on chronic diastolic heart failure        HPI:  Patient is a 59F with CHF (EF 60%, G2DD), HTN, Afib (s/p watchman procedure), T2DM (prev. on tirzepatide) presents to the ED w/ minor fall and progressively worsening SOB. Notes she couldn't get up after her fall, came in in a wheelchair. Notes she has had SOB for the past month with exertion, though now it is with rest as well. Reports orthopnea during this time frame. States she does not have any pain with breathing, but does additionally endorse occasional, intermittent chest pain. Denies abdominal pain, fever, or chills. States she has early satiety. No prior admission for HF exacerbation. Reports experiencing b/l lower extremity edema previously, but denies prior abdominal swelling. Swelling precluding normal ambulation, using walker (previously for balance) to assist d.t weakness. Has not missed doses of home lasix or antihypertensive medications. No reduced urine output at home.     In ED, normotensive, no tachycardia, some tachypnea (RR 22-28), initially on NRB -> HFNC 12L -> BIPAP -> 6L. No desaturations recorded during transitions in O2 therapy (BIPAP primarily placed for pulmonary edema seen on CXR). CBC with normocytic anemia (Hb 10.6), no leukocytosis. CMP with hypokalemia K 2.9, Cr 1.8/BUN 27 (baseline appears to be 1-1.4), Alb 2.7, no LFT elevation. Mg 1.5. , Troponin normal. VBG 7.46 / 42. CT AP with cirrhotic liver morphology, questionable GB sludge/stones, large volume ascites. CXR with hypoventilatory changes, R>L perihilar infiltrates c/f CHF. EKG NSR.  Received K replacement, 100mg IV Lasix in ED.        Overview/Hospital Course:  Patient is here for SOB due to volume overload. Patient has had CHF exacerbation in the past but also has evidence of a cirrhosis requiring a paracentesis in the ED in which they removed 7 L of fluid. The ascitic fluid was sent to lab and there is no evidence of SBP at this time. Abdominal CT shows a cirrhotic morphology of the liver. US/doppler of  liver shows evidence of cirrhosis, splenotmegally, and portal hypertension. She shows no signs of jaundice. Her liver enzymes, Alk Phos, and bilirubin are wnl. Protein studies of the ascitic fluid show increased protein leaning more towards a cardiac etiology.  CXR shows heart to be boarderline in regards to being enlarged and some perihilar infiltrate. ECHO on (7/29/24) showed intermediate diastolic dysfunction with EF 60-65%, minor aortic stenosis, and moderate pulmonary hypertension with estimated pulmonary artery systolic pressure is 54 mmHg.Paracentesis performed (7/30/24) removed an additional 9.8L.  She has had some problems with confusion overnight. Believe that home BIPAP slipping off and patient not getting adequate saturation may be playing a role. Oriented by morning. Respiratory therapy switched out her mask and saturations overnight improved. Studies on ascitic fluid from paracentesis have been somewhat mixed in results. SAAG on initial paracentesis was 1.0 with a total protein of 3.2. Ascitic fluid studies on the next paracentesis (that pulled out 9.8 L) showed a SAAG of 1.5 and total protein of 2.9. IR consulted for liver biopsy to help r/o the liver as the cause of ascities. Plan for biopsy 8/5/24. Cardiology consulted and they recommend a right heart cath for Monday 8/5. PT/OT recommends acute skilled PT after discharge.     Interval History: Patient scheduled for right heart cath Monday as well as potentially a liver biopsy by IR. Patient did have another round of diarrhea last night reporting 3 bowel movements  yesterday and 2 bowel movements overnight. Nurse reports that it did not look like C. Diff.     Review of Systems   Constitutional:  Negative for chills and diaphoresis.   Respiratory:  Positive for shortness of breath. Negative for cough.         SOB still present but improved; down to 2L   Cardiovascular:  Positive for leg swelling. Negative for chest pain and palpitations.   Gastrointestinal:  Positive for abdominal distention, abdominal pain and diarrhea. Negative for blood in stool, nausea and vomiting.        Patient reports 3 BM yesterday and an additional 2 overnight. No melena or hematochezia reported.     Some crampy pain right before BMs   Genitourinary:  Negative for difficulty urinating, dysuria and hematuria.   Musculoskeletal:  Positive for arthralgias.        Complains of pain in her legs. Patient fell before coming to the ED.    Neurological:  Negative for dizziness and headaches.     Objective:     Vital Signs (Most Recent):  Temp: 97.7 °F (36.5 °C) (08/04/24 1223)  Pulse: 64 (08/04/24 1223)  Resp: 18 (08/04/24 1223)  BP: 106/65 (08/04/24 1223)  SpO2: 100 % (08/04/24 1223) Vital Signs (24h Range):  Temp:  [97.4 °F (36.3 °C)-98.4 °F (36.9 °C)] 97.7 °F (36.5 °C)  Pulse:  [63-77] 64  Resp:  [18-20] 18  SpO2:  [87 %-100 %] 100 %  BP: (106-126)/(59-79) 106/65     Weight: 101 kg (222 lb 10.6 oz)  Body mass index is 37.05 kg/m².    Intake/Output Summary (Last 24 hours) at 8/4/2024 1516  Last data filed at 8/4/2024 0519  Gross per 24 hour   Intake 296.44 ml   Output 500 ml   Net -203.56 ml         Physical Exam  Constitutional:       General: She is not in acute distress.     Appearance: She is obese. She is not ill-appearing.   HENT:      Head: Normocephalic and atraumatic.   Eyes:      General: No scleral icterus.  Cardiovascular:      Rate and Rhythm: Normal rate and regular rhythm.   Pulmonary:      Effort: Pulmonary effort is normal.      Breath sounds: Normal breath sounds.      Comments: Patient on  2 L nasal canula  Abdominal:      General: Bowel sounds are normal. There is distension.      Tenderness: There is no abdominal tenderness. There is no guarding or rebound.   Musculoskeletal:      Right lower leg: Edema present.      Left lower leg: Edema present.      Comments: Edema improved since admission. Slight pitting edema.  Patient not wearing compression stockings.    Skin:     General: Skin is warm and dry.      Coloration: Skin is not jaundiced.   Neurological:      Mental Status: She is alert and oriented to person, place, and time.   Psychiatric:         Mood and Affect: Mood normal.         Behavior: Behavior normal.             Significant Labs: All pertinent labs within the past 24 hours have been reviewed.    Significant Imaging: I have reviewed all pertinent imaging results/findings within the past 24 hours.    Assessment/Plan:      * Acute on chronic diastolic heart failure  Cardiorenal syndrome  Acute HF exacerbation of uncertain etiology (no missed doses of lasix). Last TTE with EF 60% and G2DD. Concern that she may have new R-sided HF given anasarca (large volume ascites) versus now concomitant MASH cirrhosis compounding volume overloaded state and therefore needs higher diuresis at baseline. Suspect JHONATAN d.t overload - CRS.     Total 17L removed via para + 8L UOP  TTE with intermediate diastolic dysfunction with EF 60-65%, minor aortic stenosis, and moderate pulmonary hypertension with estimated pulmonary artery systolic pressure is 54 mmHg.    - Lasix 80mg IV BID and spironolactone 50mg; currently holding diuretics due to dip in kidney function  - RFP BID  - BIPAP qhs and during naps     - Fluid restriction at 1200 cc with strict I/Os and daily weights   - Check Electrolytes, keep Mag >2 & K+ >4  - Ambulate as tolerated      Anasarca  Concerned anasarca may represent sequelae of cirrhosis. EGD 8/2021 with Portal hypertensive gastropathy. No varices. No abd pain, fever, chills, or confusion to  raise concern for SBP or HE respectively    - Liver doppler  - Aldactone and Lasix for diuresis  - Favor albumin if hypotensive  - LVP   -Initial SBP Gram stain negative for WBC making SBP less likely  -studies on ascites fluid  leaned slightly toward cardiac origin but some results pointed more towards the liver; Liver biopsy scheduled to help r/o the liver as the main cause.  Cardiology consulted and recommends a dianostic right heart cath on 8/5/24.      Stage 3b chronic kidney disease  Improved    Creatine stable for now. BMP reviewed- noted Estimated Creatinine Clearance: 58.7 mL/min (based on SCr of 1.2 mg/dL). according to latest data. Based on current GFR, CKD stage is stage 3 - GFR 30-59.  Monitor UOP and serial BMP and adjust therapy as needed. Renally dose meds. Avoid nephrotoxic medications and procedures.    Currently holding diuretics due to dip in kidney function    NAFLD (nonalcoholic fatty liver disease)  Seen by Dr. Saba with hepatology for previous chronic mild elevation in LFT, determined most likely NADLF. Now with cirrhotic morphology on CT. Previous EGD with portal gastropathy. LFT normal on admit. 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     - Liver US with Doppler  - Started on spironolactone 25mg and Lasix 80 BID ; currently holding diuretics due to dip in kidney function  -IR consulted; will perform a diagnostic liver biopsy      Volume overload  IR consulted for diagnostic liver biopsy to help r/o the liver as a primary cause    Cardiology consulted and recommends a diagnostic heart cath on 8/5/24.      Type 2 diabetes mellitus with diabetic polyneuropathy, without long-term current use of insulin  Patient's FSGs are controlled on current medication regimen.  Last A1c reviewed-   Lab Results   Component Value Date    HGBA1C 4.9 07/27/2024     Blood sugars are staying in the 80s-90s  Current correctional scale   holding as BG stable    Hold Oral  hypoglycemics while patient is in the hospital.    Atrial Fibrillation (paroxysmal)  Patient with Paroxysmal (<7 days) atrial fibrillation which is controlled currently with Beta Blocker (Metoprolol succinate 100 mg BID) Patient is currently in sinus rhythm.[id number] Score: 3. Anticoagulation not indicated due to has Watchman .    Essential hypertension  Chronic, controlled. Latest blood pressure and vitals reviewed-     Temp:  [97.5 °F (36.4 °C)-98.5 °F (36.9 °C)]   Pulse:  [60-81]   Resp:  [12-22]   BP: ()/(57-69)   SpO2:  [68 %-100 %] .   Home meds for hypertension were reviewed and noted below.   Hypertension Medications               furosemide (LASIX) 40 MG tablet Take 1 tablet (40 mg total) by mouth 2 (two) times daily. Resume as needed for edema until followup with your PCP.    lisinopriL (PRINIVIL,ZESTRIL) 40 MG tablet Take 1 tablet (40 mg total) by mouth once daily.    metoprolol succinate (TOPROL-XL) 100 MG 24 hr tablet Take 1 tablet (100 mg total) by mouth 2 (two) times daily.    NIFEdipine (PROCARDIA-XL) 60 MG (OSM) 24 hr tablet Take 1 tablet (60 mg total) by mouth once daily.            While in the hospital, will manage blood pressure as follows; Adjust home antihypertensive regimen as follows- Continue toprol for afib, hold other antihypertensives d/t need for aggressive diuresis plus presence of JHONATAN. Did add spironolactone for K retention and now presence of ascites in setting of possible cirrhosis     Will utilize p.r.n. blood pressure medication only if patient's blood pressure greater than 220/110 and she develops symptoms such as worsening chest pain or shortness of breath.      VTE Risk Mitigation (From admission, onward)           Ordered     heparin (porcine) injection 7,500 Units  Every 8 hours         07/29/24 1202     IP VTE HIGH RISK PATIENT  Once         07/27/24 0351     Place sequential compression device  Until discontinued         07/27/24 0351                    Discharge  Planning   RAYO: 8/8/2024     Code Status: Full Code   Is the patient medically ready for discharge?:     Reason for patient still in hospital (select all that apply): Patient trending condition and Treatment  Discharge Plan A: Skilled Nursing Facility   Discharge Delays: (!) Procedure Scheduling (IR, OR, Labs, Echo, Cath, Echo, EEG) (hepatology consulted)              Marah Castro MD  Department of Hospital Medicine   [facility] - [facility]down [facility] ([place]-[facility])

## 2024-08-04 NOTE — PROGRESS NOTES
[facility] ([place] [id number])  Cardiology  Progress Note    Patient Name: Vicky Alvarado  MRN: [id number]  Admission Date: 7/26/2024  Hospital Length of Stay: 8 days  Code Status: Full Code   Attending Physician: Cory Hairston, *   Primary Care Physician: Gordy Cordero MD  Expected Discharge Date: 8/8/2024  Principal Problem:Acute on chronic diastolic heart failure    Subjective:     Hospital Course:   No notes on file    Interval History: Pt reports increase in swelling of abdomen. She denies any changes in CP or SOB. No orthopnea.     Review of Systems   Constitutional: Positive for weight gain. Negative for chills and fever.   Cardiovascular:  Positive for chest pain, dyspnea on exertion, leg swelling, orthopnea and palpitations.   Respiratory:  Positive for shortness of breath. Negative for cough.    Gastrointestinal:  Positive for nausea and vomiting.   Neurological:  Negative for dizziness and headaches.     Objective:     Vital Signs (Most Recent):  Temp: 97.7 °F (36.5 °C) (08/04/24 1223)  Pulse: 64 (08/04/24 1223)  Resp: 18 (08/04/24 1223)  BP: 106/65 (08/04/24 1223)  SpO2: 100 % (08/04/24 1223) Vital Signs (24h Range):  Temp:  [97.4 °F (36.3 °C)-98.4 °F (36.9 °C)] 97.7 °F (36.5 °C)  Pulse:  [63-77] 64  Resp:  [18-20] 18  SpO2:  [87 %-100 %] 100 %  BP: (106-126)/(59-79) 106/65     Weight: 101 kg (222 lb 10.6 oz)  Body mass index is 37.05 kg/m².     SpO2: 100 %         Intake/Output Summary (Last 24 hours) at 8/4/2024 1447  Last data filed at 8/4/2024 0519  Gross per 24 hour   Intake 296.44 ml   Output 500 ml   Net -203.56 ml       Lines/Drains/Airways       Drain  Duration             Female External Urinary Catheter w/ Suction 07/27/24 0035 8 days              Peripheral Intravenous Line  Duration                  Peripheral IV - Single Lumen 08/01/24 1053 20 G Distal;Left;Posterior Forearm 3 days         Peripheral IV - Single Lumen 08/01/24 1105 20 G 1 3/4 in Anterior;Left  Upper Arm 3 days                       Physical Exam  Constitutional:       General: She is not in acute distress.     Appearance: She is obese.   HENT:      Head: Normocephalic and atraumatic.      Mouth/Throat:      Pharynx: Oropharynx is clear.   Eyes:      Extraocular Movements: Extraocular movements intact.      Pupils: Pupils are equal, round, and reactive to light.   Cardiovascular:      Rate and Rhythm: Normal rate and regular rhythm.      Pulses: Normal pulses.      Heart sounds: Murmur heard.      Gallop present. S4 sounds present.   Pulmonary:      Effort: Pulmonary effort is normal.      Breath sounds: Normal breath sounds.   Abdominal:      General: There is distension.      Palpations: Abdomen is soft.      Tenderness: There is no abdominal tenderness.   Musculoskeletal:         General: Normal range of motion.      Cervical back: Neck supple.      Right lower leg: Edema present.      Left lower leg: Edema present.   Skin:     General: Skin is warm and dry.      Capillary Refill: Capillary refill takes less than 2 seconds.   Neurological:      General: No focal deficit present.      Mental Status: She is alert and oriented to person, place, and time.   Psychiatric:         Mood and Affect: Mood normal.         Behavior: Behavior normal.            Significant Labs: CMP   Recent Labs   Lab 08/03/24  0551 08/04/24  0550    136   K 4.1 4.2   CL 93* 90*   CO2 38* 34*   GLU 93 83   BUN 23* 27*   CREATININE 0.9 1.2   CALCIUM 9.1 9.4   PROT 6.2 6.4   ALBUMIN 2.1* 2.1*   BILITOT 1.1* 1.2*   ALKPHOS 138* 181*   AST 26 31   ALT <5* 6*   ANIONGAP 8 12       Significant Imaging: Echocardiogram: Transthoracic echo (TTE) complete ([facility] Only):   Results for orders placed or performed during the hospital encounter of 07/26/24   Echo   Result Value Ref Range    BSA 2.31 m2    LVOT stroke volume 78.59 cm3    LVIDd 5.23 3.5 - 6.0 cm    LV Systolic Volume 49.86 mL    LV Systolic Volume Index 22.7 mL/m2    LVIDs  3.47 2.1 - 4.0 cm    LV Diastolic Volume 131.05 mL    LV Diastolic Volume Index 59.57 mL/m2    IVS 0.76 0.6 - 1.1 cm    LVOT diameter 2.0 cm    LVOT area 3.1 cm2    FS 34 28 - 44 %    Left Ventricle Relative Wall Thickness 0.31 cm    Posterior Wall 0.81 0.6 - 1.1 cm    LV mass 143.19 g    LV Mass Index 65 g/m2    MV Peak E Eulalio 1.06 m/s    TDI LATERAL 0.14 m/s    TDI SEPTAL 0.07 m/s    E/E' ratio 10.10 m/s    TR Max Eulalio 3.58 m/s    IVRT 85.63 msec    E wave deceleration time 217.62 msec    LV SEPTAL E/E' RATIO 15.14 m/s    LA Volume Index 52.9 mL/m2    LV LATERAL E/E' RATIO 7.57 m/s    LA volume 116.34 cm3    LVOT peak eulalio 1.10 m/s    RV- trejo basal diam 5.2 cm    TAPSE 3.10 cm    LA size 4.93 cm    Left Atrium Minor Axis 6.11 cm    Left Atrium Major Axis 6.12 cm    LA volume (mod) 92.76 cm3    LA WIDTH 4.54 cm    LA Volume Index (Mod) 42.2 mL/m2    RA Major Axis 5.2 cm    RA Width 4.3 cm    AV mean gradient 13 mmHg    AV peak gradient 23 mmHg    Ao peak eulalio 2.39 m/s    Ao VTI 48.12 cm    LVOT peak VTI 25.03 cm    AV valve area 1.63 cm²    AV Velocity Ratio 0.46     AV index (prosthetic) 0.52     LENCHO by Velocity Ratio 1.45 cm²    MV stenosis pressure 1/2 time 63.11 ms    MV valve area p 1/2 method 3.49 cm2    Triscuspid Valve Regurgitation Peak Gradient 51 mmHg    Sinus 2.44 cm    STJ 2.26 cm    Ascending aorta 2.92 cm    Mean e' 0.11 m/s    ZLVIDS -2.17     ZLVIDD -3.62     TV resting pulmonary artery pressure 54 mmHg    RV TB RVSP 7 mmHg    Est. RA pres 3 mmHg    Narrative      Left Ventricle: The left ventricle is normal in size. Normal wall   thickness. Normal wall motion. There is normal systolic function with a   visually estimated ejection fraction of 60 - 65%. There is indeterminate   diastolic function.    Right Ventricle: Moderate right ventricular enlargement. Systolic   function is borderline low.    Left Atrium: Left atrium is severely dilated.    Right Atrium: Right atrium is dilated. Catheter present  in the right   atrium.    Aortic Valve: The aortic valve is a trileaflet valve. There is mild   aortic valve sclerosis. There is mild annular calcification present. There   is mild stenosis. Aortic valve area by VTI is 1.63 cm². Aortic valve peak   velocity is 2.39 m/s. Mean gradient is 13 mmHg. The dimensionless index is   0.52.    Pulmonary Artery: There is moderate pulmonary hypertension. The   estimated pulmonary artery systolic pressure is 54 mmHg.    IVC/SVC: Normal venous pressure at 3 mmHg.    Ascites present.       Assessment and Plan:       Volume overload  Liver US with doppler ordered with evidence of cirrhotic liver morphology with portal HTN, splenomegaly and moderate volume ascites. Last seen in hepatology clinic by Dr. Gutierres in 4/2023 with plans for TJ liver biopsy with pressure measurements, but seems patient was lost to follow up. LFTs and ALP wnl. Tbili today slightly elevated at 1.2 with direct bili 0.8.     Ascitic fluid studies with initial SAAG was 1.0 with total ascitic protein of 3.2. Repeat paracentesis with removal of 10L on 7/30 had SAAG of 1.5 with total ascitic protein of 2.9. Consistent with exudative fluid, as protein >2.5 g. If overload in setting of heart failure, would expect ascitic fluid to be transudative.    , troponin <0.006. EKG shows RBBB. Has been receiving IV furosemide 80 mg BID. UOP documented at 800 mL, though accuracy of documentation questionable as pt reported frequent urination and significant improvement in peripheral edema with diuresis. Pt did state to feel as if she was in afib all day on 8/1. She is currently compliant on metoprolol succinate 100 mg and nifedipine 60 mg for rate control at home. Also s/p watchman placement in 2023. 7/28 TTE with EF 60-65%, moderate RV enlargement, LA severe dilation, CVP 3 mmHg, moderate pulmonary hypertension with pulmonary artery systolic pressure 54 mmHg, and mild AS. S4 heart sound on exam, likely in setting of  pulmonary HTN. Abdominal swelling also notably increased, though pt without c/o worsening CP or SOB. Lungs CTAB. Given low CVP, not convinced volume overload is d/t heart failure. Right heart catheterization on Monday, 8/5, for better diagnostic evaluation of the heart.        VTE Risk Mitigation (From admission, onward)           Ordered     heparin (porcine) injection 7,500 Units  Every 8 hours         07/29/24 1202     IP VTE HIGH RISK PATIENT  Once         07/27/24 0351     Place sequential compression device  Until discontinued         07/27/24 0351                    Krystyna Livingston, DO  Cardiology  [facility] ([place])

## 2024-08-04 NOTE — CARE UPDATE
RHC consent done and inserted in chart  Case request also ordered  No need for NPO  Will do RHC with HTS tomorrow, appreciate help by Dr Liu

## 2024-08-05 LAB
ALBUMIN SERPL BCP-MCNC: 2.1 G/DL (ref 3.5–5.2)
ALP SERPL-CCNC: 185 U/L (ref 55–135)
ALT SERPL W/O P-5'-P-CCNC: 7 U/L (ref 10–44)
ANION GAP SERPL CALC-SCNC: 12 MMOL/L (ref 8–16)
ANTI SM ANTIBODY: 0.06 RATIO (ref 0–0.99)
ANTI SM/RNP ANTIBODY: 0.07 RATIO (ref 0–0.99)
ANTI-SM INTERPRETATION: NEGATIVE
ANTI-SM/RNP INTERPRETATION: NEGATIVE
ANTI-SSA ANTIBODY: 0.05 RATIO (ref 0–0.99)
ANTI-SSA INTERPRETATION: NEGATIVE
ANTI-SSB ANTIBODY: 0.05 RATIO (ref 0–0.99)
ANTI-SSB INTERPRETATION: NEGATIVE
AST SERPL-CCNC: 30 U/L (ref 10–40)
BACTERIA SPEC ANAEROBE CULT: NORMAL
BASOPHILS # BLD AUTO: 0.04 K/UL (ref 0–0.2)
BASOPHILS NFR BLD: 0.5 % (ref 0–1.9)
BILIRUB SERPL-MCNC: 0.9 MG/DL (ref 0.1–1)
BUN SERPL-MCNC: 34 MG/DL (ref 6–20)
CALCIUM SERPL-MCNC: 9.4 MG/DL (ref 8.7–10.5)
CHLORIDE SERPL-SCNC: 89 MMOL/L (ref 95–110)
CLINICAL BIOCHEMIST REVIEW: NORMAL
CO2 SERPL-SCNC: 35 MMOL/L (ref 23–29)
CREAT SERPL-MCNC: 1.5 MG/DL (ref 0.5–1.4)
DIFFERENTIAL METHOD BLD: ABNORMAL
DSDNA AB SER-ACNC: NORMAL [IU]/ML
EOSINOPHIL # BLD AUTO: 0.3 K/UL (ref 0–0.5)
EOSINOPHIL NFR BLD: 3.3 % (ref 0–8)
ERYTHROCYTE [DISTWIDTH] IN BLOOD BY AUTOMATED COUNT: 14 % (ref 11.5–14.5)
EST. GFR  (NO RACE VARIABLE): 39.6 ML/MIN/1.73 M^2
GLUCOSE SERPL-MCNC: 81 MG/DL (ref 70–110)
HCT VFR BLD AUTO: 33 % (ref 37–48.5)
HGB BLD-MCNC: 10.1 G/DL (ref 12–16)
IMM GRANULOCYTES # BLD AUTO: 0.04 K/UL (ref 0–0.04)
IMM GRANULOCYTES NFR BLD AUTO: 0.5 % (ref 0–0.5)
LYMPHOCYTES # BLD AUTO: 0.7 K/UL (ref 1–4.8)
LYMPHOCYTES NFR BLD: 8.3 % (ref 18–48)
MAGNESIUM SERPL-MCNC: 2 MG/DL (ref 1.6–2.6)
MCH RBC QN AUTO: 27.4 PG (ref 27–31)
MCHC RBC AUTO-ENTMCNC: 30.6 G/DL (ref 32–36)
MCV RBC AUTO: 90 FL (ref 82–98)
MONOCYTES # BLD AUTO: 1.2 K/UL (ref 0.3–1)
MONOCYTES NFR BLD: 13.7 % (ref 4–15)
NEUTROPHILS # BLD AUTO: 6.3 K/UL (ref 1.8–7.7)
NEUTROPHILS NFR BLD: 73.7 % (ref 38–73)
NRBC BLD-RTO: 0 /100 WBC
PHOSPHATE SERPL-MCNC: 3.9 MG/DL (ref 2.7–4.5)
PLATELET # BLD AUTO: 224 K/UL (ref 150–450)
PLPETH BLD-MCNC: 14 NG/ML
PMV BLD AUTO: 10.3 FL (ref 9.2–12.9)
POPETH BLD-MCNC: 16 NG/ML
POTASSIUM SERPL-SCNC: 4.2 MMOL/L (ref 3.5–5.1)
PROT SERPL-MCNC: 6.2 G/DL (ref 6–8.4)
RBC # BLD AUTO: 3.68 M/UL (ref 4–5.4)
SODIUM SERPL-SCNC: 136 MMOL/L (ref 136–145)
WBC # BLD AUTO: 8.55 K/UL (ref 3.9–12.7)

## 2024-08-05 PROCEDURE — 20600001 HC STEP DOWN PRIVATE ROOM

## 2024-08-05 PROCEDURE — 88313 SPECIAL STAINS GROUP 2: CPT | Mod: 59 | Performed by: PATHOLOGY

## 2024-08-05 PROCEDURE — 25000003 PHARM REV CODE 250

## 2024-08-05 PROCEDURE — C1751 CATH, INF, PER/CENT/MIDLINE: HCPCS | Performed by: INTERNAL MEDICINE

## 2024-08-05 PROCEDURE — 97110 THERAPEUTIC EXERCISES: CPT | Mod: CQ

## 2024-08-05 PROCEDURE — 25000242 PHARM REV CODE 250 ALT 637 W/ HCPCS

## 2024-08-05 PROCEDURE — 80053 COMPREHEN METABOLIC PANEL: CPT | Performed by: STUDENT IN AN ORGANIZED HEALTH CARE EDUCATION/TRAINING PROGRAM

## 2024-08-05 PROCEDURE — 99900035 HC TECH TIME PER 15 MIN (STAT)

## 2024-08-05 PROCEDURE — 94761 N-INVAS EAR/PLS OXIMETRY MLT: CPT

## 2024-08-05 PROCEDURE — 25500020 PHARM REV CODE 255: Performed by: STUDENT IN AN ORGANIZED HEALTH CARE EDUCATION/TRAINING PROGRAM

## 2024-08-05 PROCEDURE — C1894 INTRO/SHEATH, NON-LASER: HCPCS | Performed by: INTERNAL MEDICINE

## 2024-08-05 PROCEDURE — 85025 COMPLETE CBC W/AUTO DIFF WBC: CPT

## 2024-08-05 PROCEDURE — 27000221 HC OXYGEN, UP TO 24 HOURS

## 2024-08-05 PROCEDURE — 97535 SELF CARE MNGMENT TRAINING: CPT

## 2024-08-05 PROCEDURE — 02HP32Z INSERTION OF MONITORING DEVICE INTO PULMONARY TRUNK, PERCUTANEOUS APPROACH: ICD-10-PCS | Performed by: INTERNAL MEDICINE

## 2024-08-05 PROCEDURE — C1769 GUIDE WIRE: HCPCS | Performed by: INTERNAL MEDICINE

## 2024-08-05 PROCEDURE — 93451 RIGHT HEART CATH: CPT | Performed by: INTERNAL MEDICINE

## 2024-08-05 PROCEDURE — 63600175 PHARM REV CODE 636 W HCPCS: Performed by: RADIOLOGY

## 2024-08-05 PROCEDURE — 83735 ASSAY OF MAGNESIUM: CPT

## 2024-08-05 PROCEDURE — 93451 RIGHT HEART CATH: CPT | Mod: 26,,, | Performed by: INTERNAL MEDICINE

## 2024-08-05 PROCEDURE — 94640 AIRWAY INHALATION TREATMENT: CPT

## 2024-08-05 PROCEDURE — 63600175 PHARM REV CODE 636 W HCPCS

## 2024-08-05 PROCEDURE — 84100 ASSAY OF PHOSPHORUS: CPT

## 2024-08-05 PROCEDURE — 88307 TISSUE EXAM BY PATHOLOGIST: CPT | Performed by: PATHOLOGY

## 2024-08-05 PROCEDURE — 97530 THERAPEUTIC ACTIVITIES: CPT | Mod: CQ

## 2024-08-05 PROCEDURE — 94660 CPAP INITIATION&MGMT: CPT

## 2024-08-05 PROCEDURE — 4A023N6 MEASUREMENT OF CARDIAC SAMPLING AND PRESSURE, RIGHT HEART, PERCUTANEOUS APPROACH: ICD-10-PCS | Performed by: INTERNAL MEDICINE

## 2024-08-05 PROCEDURE — 97112 NEUROMUSCULAR REEDUCATION: CPT

## 2024-08-05 PROCEDURE — 25000003 PHARM REV CODE 250: Performed by: INTERNAL MEDICINE

## 2024-08-05 RX ORDER — LIDOCAINE HYDROCHLORIDE 20 MG/ML
INJECTION, SOLUTION EPIDURAL; INFILTRATION; INTRACAUDAL; PERINEURAL
Status: DISCONTINUED | OUTPATIENT
Start: 2024-08-05 | End: 2024-08-05 | Stop reason: HOSPADM

## 2024-08-05 RX ORDER — MIDAZOLAM HYDROCHLORIDE 1 MG/ML
INJECTION, SOLUTION INTRAMUSCULAR; INTRAVENOUS
Status: COMPLETED | OUTPATIENT
Start: 2024-08-05 | End: 2024-08-05

## 2024-08-05 RX ORDER — FENTANYL CITRATE 50 UG/ML
INJECTION, SOLUTION INTRAMUSCULAR; INTRAVENOUS
Status: COMPLETED | OUTPATIENT
Start: 2024-08-05 | End: 2024-08-05

## 2024-08-05 RX ADMIN — SODIUM CHLORIDE, POTASSIUM CHLORIDE, SODIUM LACTATE AND CALCIUM CHLORIDE 250 ML: 600; 310; 30; 20 INJECTION, SOLUTION INTRAVENOUS at 05:08

## 2024-08-05 RX ADMIN — MIDAZOLAM 1 MG: 1 INJECTION INTRAMUSCULAR; INTRAVENOUS at 10:08

## 2024-08-05 RX ADMIN — IPRATROPIUM BROMIDE AND ALBUTEROL SULFATE 3 ML: 2.5; .5 SOLUTION RESPIRATORY (INHALATION) at 09:08

## 2024-08-05 RX ADMIN — Medication 3 MG: at 09:08

## 2024-08-05 RX ADMIN — IOHEXOL 20 ML: 300 INJECTION, SOLUTION INTRAVENOUS at 10:08

## 2024-08-05 RX ADMIN — HEPARIN SODIUM 7500 UNITS: 5000 INJECTION INTRAVENOUS; SUBCUTANEOUS at 10:08

## 2024-08-05 RX ADMIN — DULOXETINE HYDROCHLORIDE 60 MG: 30 CAPSULE, DELAYED RELEASE ORAL at 08:08

## 2024-08-05 RX ADMIN — FENTANYL CITRATE 50 MCG: 50 INJECTION, SOLUTION INTRAMUSCULAR; INTRAVENOUS at 10:08

## 2024-08-05 RX ADMIN — ACETAMINOPHEN 650 MG: 325 TABLET ORAL at 09:08

## 2024-08-05 RX ADMIN — HEPARIN SODIUM 7500 UNITS: 5000 INJECTION INTRAVENOUS; SUBCUTANEOUS at 04:08

## 2024-08-05 RX ADMIN — PANTOPRAZOLE SODIUM 40 MG: 40 TABLET, DELAYED RELEASE ORAL at 08:08

## 2024-08-05 RX ADMIN — METOPROLOL SUCCINATE 100 MG: 100 TABLET, EXTENDED RELEASE ORAL at 08:08

## 2024-08-05 RX ADMIN — IPRATROPIUM BROMIDE AND ALBUTEROL SULFATE 3 ML: 2.5; .5 SOLUTION RESPIRATORY (INHALATION) at 01:08

## 2024-08-05 RX ADMIN — ALPRAZOLAM 0.25 MG: 0.25 TABLET ORAL at 08:08

## 2024-08-05 RX ADMIN — ALPRAZOLAM 0.25 MG: 0.25 TABLET ORAL at 09:08

## 2024-08-05 RX ADMIN — METOPROLOL SUCCINATE 100 MG: 100 TABLET, EXTENDED RELEASE ORAL at 09:08

## 2024-08-05 NOTE — PLAN OF CARE
Pt tolerated transjugular liver biopsy well, rg neck dressing c/d/I, called report to floor nurse, transfer to [facility], Pt has cath lab procedure after, please called maria teresa [phone] prior to d/c from mpu.

## 2024-08-05 NOTE — ASSESSMENT & PLAN NOTE
Seen by Dr. Saba with hepatology for previous chronic mild elevation in LFT, determined most likely NADLF. Now with cirrhotic morphology on CT. Previous EGD with portal gastropathy. LFT normal on admit. 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     - Liver US with Doppler  - Started on spironolactone 25mg and Lasix 80 BID ; currently holding diuretics due to dip in kidney function  -IR consulted; performed liver biopsy; awaiting results.

## 2024-08-05 NOTE — PLAN OF CARE
Pt arrived to 190 for transjugular liver biopsy. Pt oriented to unit and staff, Pt safely transferred from stretcher to procedural table. Fall risk reviewed and comfort measures utilized with interventions. Safety strap applied, position pillows to minimize pressure points. Blankets applied. Pt prepped and draped utilizing standard sterile technique. Patient placed on continuous monitoring, as required by sedation policy. Timeouts implemented utilizing standard universal time-out per department and facility policy. RN to remain at bedside with continuous monitoring. Pt resting comfortably. Denies pain/discomfort. Will continue to monitor. See flow sheets for monitoring, medication administration, and updates. patient verbalizes understanding.

## 2024-08-05 NOTE — PT/OT/SLP PROGRESS
"Occupational Therapy   Co-Treatment    Name: Vicky Alvarado  MRN: [id number]  Admitting Diagnosis:  Acute on chronic diastolic heart failure  Day of Surgery    Recommendations:     Discharge Recommendations: Moderate Intensity Therapy  Discharge Equipment Recommendations:  walker, rolling  Barriers to discharge:  None    Assessment:     Vicky Alvarado is a 59 y.o. female with a medical diagnosis of Acute on chronic diastolic heart failure.  She presents with the following performance deficits affecting function: weakness, impaired endurance, impaired self care skills, impaired functional mobility, gait instability, decreased lower extremity function, impaired balance, pain, decreased safety awareness, impaired cardiopulmonary response to activity. Pt was willing to participate, tolerated session fairly overall. Pt was able to perform bed mobility and sit>stand with assistance. Pt tolerated sitting eob well to perform LB dressing. Pt c/ fear of falling, nervousness during t/f's.     Rehab Prognosis:  Fair; patient would benefit from acute skilled OT services to address these deficits and reach maximum level of function.       Plan:     Patient to be seen 4 x/week to address the above listed problems via self-care/home management, therapeutic activities, therapeutic exercises, neuromuscular re-education  Plan of Care Expires: 08/29/24  Plan of Care Reviewed with: patient    Subjective     Chief Complaint: foot pain  Patient/Family Comments/goals: "I want to get to the chair."  Pain/Comfort:  Pain Rating 1:  (not rated)  Location - Side 1: Bilateral  Location - Orientation 1: generalized  Location 1: foot  Pain Addressed 1: Reposition, Distraction, Cessation of Activity  Pain Rating Post-Intervention 1:  (not rated)    Objective:     Communicated with: RN prior to session.  Patient found supine with pulse ox (continuous), telemetry, oxygen upon OT entry to room.    General Precautions: Standard, fall  "   Orthopedic Precautions:N/A  Braces: N/A  Respiratory Status: Nasal cannula, flow 1 L/min     Occupational Performance:     Bed Mobility:    Patient completed Rolling/Turning to Left with  stand by assistance  Patient completed Scooting/Bridging with maximal assistance  Patient completed Supine to Sit with minimum assistance and 2 persons  Patient completed Sit to Supine with minimum assistance     Functional Mobility/Transfers:  Patient completed Sit <> Stand Transfer with moderate assistance and of 2 persons  with  rolling walker   Functional Mobility: not attempted 2/2 to pt safety    Activities of Daily Living:  Lower Body Dressing: setup/Mod A; pt doffed/donned socks sitting eob; assistance to doff sock, bring leg up to figure 4 position; pt able to don socks      Balance:  Dynamic sitting balance: SBA ~7 min; static sitting balance: SBA/SPV ~10 min, no LOB    AMPAC 6 Click ADL: 18    Treatment & Education:  Pt edu on role of OT, POC, safety when performing self care tasks , benefit of performing OOB activity, and safety when performing functional transfers and mobility.  - White board updated  - Self care tasks completed-- as noted above    - pt educated on safe t/f's c/ RW    Patient left supine with all lines intact, call button in reach, and RN notified    Co-treat with PT to have 2 skilled therapists present to safely assess pt's functional mobility.     GOALS:   Multidisciplinary Problems       Occupational Therapy Goals          Problem: Occupational Therapy    Goal Priority Disciplines Outcome Interventions   Occupational Therapy Goal     OT, PT/OT Progressing    Description: Goals to be met by: 8/5/2024     Patient will increase functional independence with ADLs by performing:    UE Dressing with Set-up Assistance.  LE Dressing with Minimal Assistance.  Grooming while standing at sink with Supervision.  Toileting from toilet with Supervision for hygiene and clothing management.   Toilet transfer to  toilet with Supervision.                         Time Tracking:     OT Date of Treatment: 08/05/24  OT Start Time: 1400  OT Stop Time: 1432  OT Total Time (min): 32 min    Billable Minutes:Self Care/Home Management 15  Neuromuscular Re-education 17    OT/GASPER: OT          8/5/2024

## 2024-08-05 NOTE — PT/OT/SLP PROGRESS
Physical Therapy Co Treatment    Patient Name:  Vicky Alvarado   MRN:  [id number]    Recommendations:     Discharge Recommendations: Moderate Intensity Therapy  Discharge Equipment Recommendations:  (bariatric RW)  Barriers to discharge:  Current level of assistance required    Assessment:     Vicky Alvarado is a 59 y.o. female admitted with a medical diagnosis of Acute on chronic diastolic heart failure.  She presents with the following impairments/functional limitations: weakness, impaired endurance, gait instability, impaired balance, impaired functional mobility, impaired self care skills, decreased lower extremity function, decreased safety awareness, pain, impaired skin, impaired cardiopulmonary response to activity . Pt remains at an increased risk for falls secondary to high anxiety and fear.  Pt remains motivated to participate with therapy and continues to progress well demonstrating appropriate weight-shift for transfers and mobility.  Pt would benefit from continued skilled physical therapy to address listed impairments, improve functional independence, and maximize safety with mobility.    Co-treatment performed due to patient's multiple deficits requiring two skilled therapists to appropriately and safely assess patient's strength and endurance while facilitating functional tasks in addition to accommodating for patient's activity tolerance.    Rehab Prognosis: Good; patient would benefit from acute skilled PT services to address these deficits and reach maximum level of function.    Recent Surgery: Procedure(s) (LRB):  INSERTION, CATHETER, RIGHT HEART (Right) Day of Surgery    Plan:     During this hospitalization, patient to be seen 4 x/week to address the identified rehab impairments via neuromuscular re-education, therapeutic activities, therapeutic exercises, gait training and progress toward the following goals:    Plan of Care Expires:  08/29/24    Subjective     Chief Complaint:  ""Both my feet have gout."  Patient/Family Comments/goals: improve mobility  Pain/Comfort:  Pain Rating 1: other (see comments) (not rated)  Location - Side 1: Bilateral  Location - Orientation 1: generalized  Location 1: foot      Objective:     Communicated with nursing (Rocio) prior to session.  Patient found HOB elevated with peripheral IV, telemetry, pulse ox (continuous), oxygen upon PT entry to room.     General Precautions: Standard, fall  Orthopedic Precautions: N/A  Braces: N/A  Respiratory Status: Nasal cannula, flow 1 L/min     Functional Mobility:  Bed Mobility:     Rolling Left:  stand by assistance  Rolling Right: stand by assistance  Scooting: maximal assistance  Supine to Sit: minimum assistance and of 2 persons  Sit to Supine: minimum assistance  Transfers:     Sit to Stand:  moderate assistance and of 2 persons with rolling walker  Balance: EOB sitting for ~15 minutes w/ SBA  Static standing w/ RW and Min A to promote upright posture and reduce forward trunk flexion  Pt became increasingly anxious in standing despite minimal assistance needed. (~30 seconds-1 min)  Therapeutic Exercise: Patient performed 2 set(s) of 10 repetitions of  the following seated exercises: ankle pumps, long arc quads, and marches for bilateral LE. Patient required skilled PT for instruction of exercises and appropriate cues to perform exercises safely and appropriately.       AM-PAC 6 CLICK MOBILITY  Turning over in bed (including adjusting bedclothes, sheets and blankets)?: 3  Sitting down on and standing up from a chair with arms (e.g., wheelchair, bedside commode, etc.): 2  Moving from lying on back to sitting on the side of the bed?: 2  Moving to and from a bed to a chair (including a wheelchair)?: 2  Need to walk in hospital room?: 1  Climbing 3-5 steps with a railing?: 1  Basic Mobility Total Score: 11       Treatment & Education  Patient provided with daily orientation and goals of this PT session.  Encouraged " patient to perform daily exercises & mobility to increase endurance and decrease effects of bedrest. Time provided for therapeutic counseling and discussion of health disposition. All questions answered to patient's satisfaction, within scope of PT practice; voiced no other concerns. White board updated in patient's room, RN notified of session.    Patient left HOB elevated with all lines intact, call button in reach, and RN notified..    GOALS:   Multidisciplinary Problems       Physical Therapy Goals          Problem: Physical Therapy    Goal Priority Disciplines Outcome Goal Variances Interventions   Physical Therapy Goal     PT, PT/OT Progressing     Description: Goals to be met by: 2024     Patient will increase functional independence with mobility by performin. Supine to sit with Stand-by Assistance  2. Sit to supine with Stand-by Assistance  3. Sit to stand transfer with Supervision  4. Bed to chair transfer with Supervision using LRAD  5. Gait  x 100 feet with Contact Guard Assistance using LRAD.   6. Ascend/descend 1 stair with no Handrails Contact Guard Assistance using LRAD.     The mobility limitation cannot be sufficiently resolved by the use of a cane.   Patient's functional mobility deficit can be sufficiently resolved with the use of a (bariatric Rolling Walker or Walker).  Patient's mobility limitation significantly impairs their ability to participate in one of more activities of daily living.  The use of a (bariatric RW or Walker) will significantly improve the patient's ability to participate in MRADLS and the patient will use it on regular basis in the home.                           Time Tracking:     PT Received On: 24  PT Start Time: 1400     PT Stop Time: 1433  PT Total Time (min): 33 min     Billable Minutes: Therapeutic Activity 15 and Therapeutic Exercise 15    Treatment Type: Treatment  PT/PTA: PTA     Number of PTA visits since last PT visit: 4     2024

## 2024-08-05 NOTE — PLAN OF CARE
Problem: Adult Inpatient Plan of Care  Goal: Plan of Care Review  Outcome: Progressing  Flowsheets (Taken 8/4/2024 1920)  Plan of Care Reviewed With: patient  Goal: Patient-Specific Goal (Individualized)  Outcome: Progressing  Goal: Absence of Hospital-Acquired Illness or Injury  Outcome: Progressing  Intervention: Identify and Manage Fall Risk  Flowsheets (Taken 8/4/2024 1920)  Safety Promotion/Fall Prevention:   assistive device/personal item within reach   side rails raised x 2  Intervention: Prevent and Manage VTE (Venous Thromboembolism) Risk  Flowsheets (Taken 8/4/2024 1920)  VTE Prevention/Management: bleeding precations maintained  Intervention: Prevent Infection  Flowsheets (Taken 8/4/2024 1920)  Infection Prevention: single patient room provided  Goal: Optimal Comfort and Wellbeing  Outcome: Progressing  Intervention: Monitor Pain and Promote Comfort  Flowsheets (Taken 8/4/2024 1920)  Pain Management Interventions: pain management plan reviewed with patient/caregiver  Intervention: Provide Person-Centered Care  Flowsheets (Taken 8/4/2024 1920)  Trust Relationship/Rapport:   care explained   choices provided   thoughts/feelings acknowledged  Goal: Readiness for Transition of Care  Outcome: Progressing      Patient alert and oriented x4; care plan discussed with patient; call light and personal belongings within reach; free from falls/injuries during the shift; pt NPO at midnight for liver biopsy, also scheduled for heart cath; RT assessed home CPAP machine and corrected problem with, pt instructed to wear at night and report any issues to RN; x1 BM during shift; patient able to express needs, no needs at this time; will continue with poc.

## 2024-08-05 NOTE — ASSESSMENT & PLAN NOTE
Improved    Creatine stable for now. BMP reviewed- noted Estimated Creatinine Clearance: 47 mL/min (A) (based on SCr of 1.5 mg/dL (H)). according to latest data. Based on current GFR, CKD stage is stage 3 - GFR 30-59.  Monitor UOP and serial BMP and adjust therapy as needed. Renally dose meds. Avoid nephrotoxic medications and procedures.    Currently holding diuretics due to dip in kidney function

## 2024-08-05 NOTE — ASSESSMENT & PLAN NOTE
Chronic, controlled. Latest blood pressure and vitals reviewed-     Temp:  [97 °F (36.1 °C)-97.7 °F (36.5 °C)]   Pulse:  [57-79]   Resp:  [15-27]   BP: (103-142)/(56-86)   SpO2:  [92 %-100 %] .   Home meds for hypertension were reviewed and noted below.   Hypertension Medications               furosemide (LASIX) 40 MG tablet Take 1 tablet (40 mg total) by mouth 2 (two) times daily. Resume as needed for edema until followup with your PCP.    lisinopriL (PRINIVIL,ZESTRIL) 40 MG tablet Take 1 tablet (40 mg total) by mouth once daily.    metoprolol succinate (TOPROL-XL) 100 MG 24 hr tablet Take 1 tablet (100 mg total) by mouth 2 (two) times daily.    NIFEdipine (PROCARDIA-XL) 60 MG (OSM) 24 hr tablet Take 1 tablet (60 mg total) by mouth once daily.            While in the hospital, will manage blood pressure as follows; Adjust home antihypertensive regimen as follows- Continue toprol for afib, hold other antihypertensives d/t need for aggressive diuresis plus presence of JHONATAN. Did add spironolactone for K retention and now presence of ascites in setting of possible cirrhosis     Will utilize p.r.n. blood pressure medication only if patient's blood pressure greater than 220/110 and she develops symptoms such as worsening chest pain or shortness of breath.

## 2024-08-05 NOTE — ASSESSMENT & PLAN NOTE
Concerned anasarca may represent sequelae of cirrhosis. EGD 8/2021 with Portal hypertensive gastropathy. No varices. No abd pain, fever, chills, or confusion to raise concern for SBP or HE respectively    - Liver doppler  - Aldactone and Lasix for diuresis  - Favor albumin if hypotensive  - LVP   -Initial SBP Gram stain negative for WBC making SBP less likely  -studies on ascites fluid  leaned slightly toward cardiac origin but some results pointed more towards the liver;     Liver biopsy performed by IR; awaiting pathology results    Right heart cath performed by cardiology; Results: Right heart cath found: normal biventricular filling pressures, borderline low CI/CO, and mild pre-capillary pulmonary HTN.

## 2024-08-05 NOTE — ASSESSMENT & PLAN NOTE
Patient with reported history of diastolic HF. EF 60% but noted RV low systolic function and PASP 54 mm Hg although normal central pressure. Unclear how much cardiac contribution there is to her volume overload.     Recommendations:    - Cardiology following, RHC pending

## 2024-08-05 NOTE — NURSING
Charge nurse made me aware that Xanax count was off in the pyxis. Went back to pt room and pulled out all medication wrappers from trash to confirm what was given. There were 2 wrappers for Xanax 0.25 mg. Pt requested dose for anxiety, per MAR pt dose = 0.25 mg, medication was misscanned and pt received 0.50 mg of medication. MD notifed. Pt vitals WNL with no complaints. MD stated that it was ok as long as pt was not desaturating or having any other events.

## 2024-08-05 NOTE — PROGRESS NOTES
[facility] ([place] [address])  [facility] Medicine  Progress Note    Patient Name: Vicky Alvarado  MRN: [id number]  Patient Class: IP- Inpatient   Admission Date: 7/26/2024  Length of Stay: 9 days  Attending Physician: Cory Hairston, *  Primary Care Provider: Gordy Cordero MD        Subjective:     Principal Problem:Acute on chronic diastolic heart failure        HPI:  Patient is a 59F with CHF (EF 60%, G2DD), HTN, Afib (s/p watchman procedure), T2DM (prev. on tirzepatide) presents to the ED w/ minor fall and progressively worsening SOB. Notes she couldn't get up after her fall, came in in a wheelchair. Notes she has had SOB for the past month with exertion, though now it is with rest as well. Reports orthopnea during this time frame. States she does not have any pain with breathing, but does additionally endorse occasional, intermittent chest pain. Denies abdominal pain, fever, or chills. States she has early satiety. No prior admission for HF exacerbation. Reports experiencing b/l lower extremity edema previously, but denies prior abdominal swelling. Swelling precluding normal ambulation, using walker (previously for balance) to assist d.t weakness. Has not missed doses of home lasix or antihypertensive medications. No reduced urine output at home.     In ED, normotensive, no tachycardia, some tachypnea (RR 22-28), initially on NRB -> HFNC 12L -> BIPAP -> 6L. No desaturations recorded during transitions in O2 therapy (BIPAP primarily placed for pulmonary edema seen on CXR). CBC with normocytic anemia (Hb 10.6), no leukocytosis. CMP with hypokalemia K 2.9, Cr 1.8/BUN 27 (baseline appears to be 1-1.4), Alb 2.7, no LFT elevation. Mg 1.5. , Troponin normal. VBG 7.46 / 42. CT AP with cirrhotic liver morphology, questionable GB sludge/stones, large volume ascites. CXR with hypoventilatory changes, R>L perihilar infiltrates c/f CHF. EKG NSR.  Received K replacement, 100mg IV Lasix in ED.        Overview/Hospital Course:  Patient is here for SOB due to volume overload. Patient has had CHF exacerbation in the past but also has evidence of a cirrhosis requiring a paracentesis in the ED in which they removed 7 L of fluid. The ascitic fluid was sent to lab and there is no evidence of SBP at this time. Abdominal CT shows a cirrhotic morphology of the liver. US/doppler of  liver shows evidence of cirrhosis, splenotmegally, and portal hypertension. She shows no signs of jaundice. Her liver enzymes, Alk Phos, and bilirubin are wnl. Protein studies of the ascitic fluid show increased protein leaning more towards a cardiac etiology.  CXR shows heart to be boarderline in regards to being enlarged and some perihilar infiltrate. ECHO on (7/29/24) showed intermediate diastolic dysfunction with EF 60-65%, minor aortic stenosis, and moderate pulmonary hypertension with estimated pulmonary artery systolic pressure is 54 mmHg.Paracentesis performed (7/30/24) removed an additional 9.8L.  She has had some problems with confusion overnight. Believe that home BIPAP slipping off and patient not getting adequate saturation may be playing a role. Oriented by morning. Respiratory therapy switched out her mask and saturations overnight improved. Studies on ascitic fluid from paracentesis have been somewhat mixed in results. SAAG on initial paracentesis was 1.0 with a total protein of 3.2. Ascitic fluid studies on the next paracentesis (that pulled out 9.8 L) showed a SAAG of 1.5 and total protein of 2.9. IR consulted for liver biopsy to help r/o the liver as the cause of ascities. Right heart cath performed 8/5/24 and it showed:  normal biventricular filling pressures, borderline low CI/CO, and mild pre-capillary pulmonary HTN. IR performed liver biopsy 7/5/24; results pending. PT/OT recommends acute skilled PT after discharge.     Interval History: Patient scheduled for right heart cath and liver biopsy by IR today. Awaiting  IR liver biopsy results. Right heart cath found: normal biventricular filling pressures, borderline low CI/CO, and mild pre-capillary pulmonary HTN. Patient complained of some foot pain today and is worried about her gout coming back. Holding lasix for now.        Review of Systems   Constitutional:  Negative for chills and diaphoresis.   Respiratory:  Positive for shortness of breath. Negative for cough.         SOB still present but improved; down to 2L   Cardiovascular:  Positive for leg swelling. Negative for chest pain and palpitations.   Gastrointestinal:  Positive for abdominal distention, abdominal pain and diarrhea. Negative for blood in stool, nausea and vomiting.        Reports 3 BM yesterday (improved) and no BM overnight   Genitourinary:  Negative for difficulty urinating, dysuria and hematuria.   Musculoskeletal:  Positive for arthralgias and joint swelling.        Complains of some discomfort in her feet. One toe on her right foot looks slightly erythematous around the joint   Neurological:  Negative for dizziness and headaches.     Objective:     Vital Signs (Most Recent):  Temp: 97.4 °F (36.3 °C) (08/05/24 1621)  Pulse: 62 (08/05/24 1621)  Resp: (!) 27 (08/05/24 1621)  BP: 111/70 (08/05/24 1621)  SpO2: 95 % (08/05/24 1304) Vital Signs (24h Range):  Temp:  [97 °F (36.1 °C)-97.7 °F (36.5 °C)] 97.4 °F (36.3 °C)  Pulse:  [57-79] 62  Resp:  [15-27] 27  SpO2:  [92 %-100 %] 95 %  BP: (103-142)/(56-86) 111/70     Weight: 101 kg (222 lb 10.6 oz)  Body mass index is 37.05 kg/m².    Intake/Output Summary (Last 24 hours) at 8/5/2024 1727  Last data filed at 8/5/2024 1042  Gross per 24 hour   Intake 400 ml   Output 3 ml   Net 397 ml         Physical Exam  Constitutional:       General: She is not in acute distress.     Appearance: She is obese. She is not ill-appearing.   HENT:      Head: Normocephalic and atraumatic.   Eyes:      General: No scleral icterus.  Cardiovascular:      Rate and Rhythm: Normal rate  and regular rhythm.   Pulmonary:      Effort: Pulmonary effort is normal.      Breath sounds: Normal breath sounds.      Comments: Patient on 2 L nasal canula  Abdominal:      General: Bowel sounds are normal. There is distension.      Tenderness: There is no abdominal tenderness. There is no guarding or rebound.   Musculoskeletal:      Right lower leg: Edema present.      Left lower leg: Edema present.      Comments: Edema improved since admission. Slight pitting edema.  Patient not wearing compression stockings.    Skin:     General: Skin is warm and dry.      Coloration: Skin is not jaundiced.   Neurological:      Mental Status: She is alert and oriented to person, place, and time.   Psychiatric:         Mood and Affect: Mood normal.         Behavior: Behavior normal.             Significant Labs: All pertinent labs within the past 24 hours have been reviewed.    Significant Imaging: I have reviewed all pertinent imaging results/findings within the past 24 hours.    Assessment/Plan:      * Acute on chronic diastolic heart failure  Cardiorenal syndrome  Acute HF exacerbation of uncertain etiology (no missed doses of lasix). Last TTE with EF 60% and G2DD. Concern that she may have new R-sided HF given anasarca (large volume ascites) versus now concomitant MASH cirrhosis compounding volume overloaded state and therefore needs higher diuresis at baseline. Suspect JHONATAN d.t overload - CRS.     Total 17L removed via para + 8L UOP  TTE with intermediate diastolic dysfunction with EF 60-65%, minor aortic stenosis, and moderate pulmonary hypertension with estimated pulmonary artery systolic pressure is 54 mmHg.    - Lasix 80mg IV BID and spironolactone 50mg; currently holding diuretics due to dip in kidney function  - RFP BID  - BIPAP qhs and during naps     - Fluid restriction at 1200 cc with strict I/Os and daily weights   - Check Electrolytes, keep Mag >2 & K+ >4  - Ambulate as tolerated      Anasarca  Concerned anasarca  may represent sequelae of cirrhosis. EGD 8/2021 with Portal hypertensive gastropathy. No varices. No abd pain, fever, chills, or confusion to raise concern for SBP or HE respectively    - Liver doppler  - Aldactone and Lasix for diuresis  - Favor albumin if hypotensive  - LVP   -Initial SBP Gram stain negative for WBC making SBP less likely  -studies on ascites fluid  leaned slightly toward cardiac origin but some results pointed more towards the liver;     Liver biopsy performed by IR; awaiting pathology results    Right heart cath performed by cardiology; Results: Right heart cath found: normal biventricular filling pressures, borderline low CI/CO, and mild pre-capillary pulmonary HTN.         Stage 3b chronic kidney disease  Improved    Creatine stable for now. BMP reviewed- noted Estimated Creatinine Clearance: 47 mL/min (A) (based on SCr of 1.5 mg/dL (H)). according to latest data. Based on current GFR, CKD stage is stage 3 - GFR 30-59.  Monitor UOP and serial BMP and adjust therapy as needed. Renally dose meds. Avoid nephrotoxic medications and procedures.    Currently holding diuretics due to dip in kidney function    NAFLD (nonalcoholic fatty liver disease)  Seen by Dr. Saba with hepatology for previous chronic mild elevation in LFT, determined most likely NADLF. Now with cirrhotic morphology on CT. Previous EGD with portal gastropathy. LFT normal on admit. 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     - Liver US with Doppler  - Started on spironolactone 25mg and Lasix 80 BID ; currently holding diuretics due to dip in kidney function  -IR consulted; performed liver biopsy; awaiting results.      Volume overload  IR consulted for diagnostic liver biopsy to help r/o the liver as a primary cause  Biopsy performed 8/5/24; awaiting results    Cardiology consulted and performed  diagnostic heart cath on 8/5/24.    Right Heart Cath Results: normal biventricular filling pressures,  borderline low CI/CO, and mild pre-capillary pulmonary HTN.         Type 2 diabetes mellitus with diabetic polyneuropathy, without long-term current use of insulin  Patient's FSGs are controlled on current medication regimen.  Last A1c reviewed-   Lab Results   Component Value Date    HGBA1C 4.9 07/27/2024     Blood sugars are staying in the 80s-90s  Current correctional scale   holding as BG stable    Hold Oral hypoglycemics while patient is in the hospital.    Atrial Fibrillation (paroxysmal)  Patient with Paroxysmal (<7 days) atrial fibrillation which is controlled currently with Beta Blocker (Metoprolol succinate 100 mg BID) Patient is currently in sinus rhythm.[id number] Score: 3. Anticoagulation not indicated due to has Watchman .    Essential hypertension  Chronic, controlled. Latest blood pressure and vitals reviewed-     Temp:  [97 °F (36.1 °C)-97.7 °F (36.5 °C)]   Pulse:  [57-79]   Resp:  [15-27]   BP: (103-142)/(56-86)   SpO2:  [92 %-100 %] .   Home meds for hypertension were reviewed and noted below.   Hypertension Medications               furosemide (LASIX) 40 MG tablet Take 1 tablet (40 mg total) by mouth 2 (two) times daily. Resume as needed for edema until followup with your PCP.    lisinopriL (PRINIVIL,ZESTRIL) 40 MG tablet Take 1 tablet (40 mg total) by mouth once daily.    metoprolol succinate (TOPROL-XL) 100 MG 24 hr tablet Take 1 tablet (100 mg total) by mouth 2 (two) times daily.    NIFEdipine (PROCARDIA-XL) 60 MG (OSM) 24 hr tablet Take 1 tablet (60 mg total) by mouth once daily.            While in the hospital, will manage blood pressure as follows; Adjust home antihypertensive regimen as follows- Continue toprol for afib, hold other antihypertensives d/t need for aggressive diuresis plus presence of JHONATAN. Did add spironolactone for K retention and now presence of ascites in setting of possible cirrhosis     Will utilize p.r.n. blood pressure medication only if patient's blood pressure  greater than 220/110 and she develops symptoms such as worsening chest pain or shortness of breath.      VTE Risk Mitigation (From admission, onward)           Ordered     heparin (porcine) injection 7,500 Units  Every 8 hours         07/29/24 1202     IP VTE HIGH RISK PATIENT  Once         07/27/24 0351     Place sequential compression device  Until discontinued         07/27/24 0351                    Discharge Planning   RAYO: 8/8/2024     Code Status: Full Code   Is the patient medically ready for discharge?:     Reason for patient still in hospital (select all that apply): Patient trending condition, Laboratory test, and Treatment  Discharge Plan A: Skilled Nursing Facility   Discharge Delays: None known at this time              Marah Castro MD  Department of Hospital Medicine   [facility] - [facility]down [facility] ([place]-[facility])

## 2024-08-05 NOTE — ASSESSMENT & PLAN NOTE
IR consulted for diagnostic liver biopsy to help r/o the liver as a primary cause  Biopsy performed 8/5/24; awaiting results    Cardiology consulted and performed  diagnostic heart cath on 8/5/24.    Right Heart Cath Results: normal biventricular filling pressures, borderline low CI/CO, and mild pre-capillary pulmonary HTN.

## 2024-08-05 NOTE — INTERVAL H&P NOTE
The patient has been examined and the H&P has been reviewed:    I concur with the findings and no changes have occurred since H&P was written.    Procedure risks, benefits and alternative options discussed and understood by patient/family.          Active Hospital Problems    Diagnosis  POA    *Acute on chronic diastolic heart failure [I50.33]  Yes    Anasarca [R60.1]  Yes    Stage 3b chronic kidney disease [N18.32]  Yes    NAFLD (nonalcoholic fatty liver disease) [K76.0]  Yes     Chronic    Volume overload [E87.70]  Yes    Type 2 diabetes mellitus with diabetic polyneuropathy, without long-term current use of insulin [E11.42]  Yes     Chronic    Atrial Fibrillation (paroxysmal) [I48.0]  Yes    Essential hypertension [I10]  Yes      Resolved Hospital Problems   No resolved problems to display.

## 2024-08-05 NOTE — PLAN OF CARE
08/4-Charge nurse made me aware that Xanax count was off in the pyxis. Went back to pt room and pulled out all medication wrappers from trash to confirm what was given. There were 2 wrappers for Xanax 0.25 mg. Pt requested dose for anxiety, per MAR pt dose = 0.25 mg, medication was misscanned and pt received 0.50 mg of medication. MD notifed. Pt vitals WNL with no complaints. MD stated that it was ok as long as pt was not desaturating or having any other events. Checked on pt, pt had removed CPAP and refused to put it back on, advised that MD said it was mandatory to wear tonight, pt only wanted to wear NC. Pt made aware of medication error, pt awake and oriented and stated she was ok. Complied and put CPAP back on and telemetry leads applied per order.      -pt will be NPO after midnight for right heart cath and liver biopsy tomorrow        Problem: Adult Inpatient Plan of Care  Goal: Plan of Care Review  Outcome: Progressing  Goal: Patient-Specific Goal (Individualized)  Outcome: Progressing  Goal: Absence of Hospital-Acquired Illness or Injury  Outcome: Progressing  Goal: Optimal Comfort and Wellbeing  Outcome: Progressing  Goal: Readiness for Transition of Care  Outcome: Progressing     Problem: Bariatric Environmental Safety  Goal: Safety Maintained with Care  Outcome: Progressing     Problem: Diabetes Comorbidity  Goal: Blood Glucose Level Within Targeted Range  Outcome: Progressing     Problem: Skin Injury Risk Increased  Goal: Skin Health and Integrity  Outcome: Progressing     Problem: Wound  Goal: Optimal Coping  Outcome: Progressing  Goal: Optimal Functional Ability  Outcome: Progressing  Goal: Absence of Infection Signs and Symptoms  Outcome: Progressing  Goal: Improved Oral Intake  Outcome: Progressing  Goal: Optimal Pain Control and Function  Outcome: Progressing  Goal: Skin Health and Integrity  Outcome: Progressing  Goal: Optimal Wound Healing  Outcome: Progressing     Problem: Pain Acute  Goal:  Optimal Pain Control and Function  Outcome: Progressing     Problem: Liver Failure  Goal: Optimal Coping with Liver Failure  Outcome: Progressing  Goal: Fluid and Electrolyte Balance  Outcome: Progressing  Goal: Optimal Gastrointestinal Function  Outcome: Progressing  Goal: Blood Glucose Level Within Target Range  Outcome: Progressing  Goal: Optimal Coagulation Function  Outcome: Progressing  Goal: Absence of Infection Signs and Symptoms  Outcome: Progressing  Goal: Optimal Neurologic Function  Outcome: Progressing  Goal: Improved Oral Intake  Outcome: Progressing  Goal: Optimal Pain Control, Comfort and Function  Outcome: Progressing  Goal: Optimize Renal Function  Outcome: Progressing  Goal: Effective Oxygenation and Ventilation  Outcome: Progressing     Problem: Diarrhea  Goal: Effective Diarrhea Management  Outcome: Progressing

## 2024-08-05 NOTE — SUBJECTIVE & OBJECTIVE
Interval History: Patient scheduled for right heart cath and liver biopsy by IR today. Awaiting IR liver biopsy results. Right heart cath found: normal biventricular filling pressures, borderline low CI/CO, and mild pre-capillary pulmonary HTN. Patient complained of some foot pain today and is worried about her gout coming back. Holding lasix for now.        Review of Systems   Constitutional:  Negative for chills and diaphoresis.   Respiratory:  Positive for shortness of breath. Negative for cough.         SOB still present but improved; down to 2L   Cardiovascular:  Positive for leg swelling. Negative for chest pain and palpitations.   Gastrointestinal:  Positive for abdominal distention, abdominal pain and diarrhea. Negative for blood in stool, nausea and vomiting.        Reports 3 BM yesterday (improved) and no BM overnight   Genitourinary:  Negative for difficulty urinating, dysuria and hematuria.   Musculoskeletal:  Positive for arthralgias and joint swelling.        Complains of some discomfort in her feet. One toe on her right foot looks slightly erythematous around the joint   Neurological:  Negative for dizziness and headaches.     Objective:     Vital Signs (Most Recent):  Temp: 97.4 °F (36.3 °C) (08/05/24 1621)  Pulse: 62 (08/05/24 1621)  Resp: (!) 27 (08/05/24 1621)  BP: 111/70 (08/05/24 1621)  SpO2: 95 % (08/05/24 1304) Vital Signs (24h Range):  Temp:  [97 °F (36.1 °C)-97.7 °F (36.5 °C)] 97.4 °F (36.3 °C)  Pulse:  [57-79] 62  Resp:  [15-27] 27  SpO2:  [92 %-100 %] 95 %  BP: (103-142)/(56-86) 111/70     Weight: 101 kg (222 lb 10.6 oz)  Body mass index is 37.05 kg/m².    Intake/Output Summary (Last 24 hours) at 8/5/2024 1727  Last data filed at 8/5/2024 1042  Gross per 24 hour   Intake 400 ml   Output 3 ml   Net 397 ml         Physical Exam  Constitutional:       General: She is not in acute distress.     Appearance: She is obese. She is not ill-appearing.   HENT:      Head: Normocephalic and atraumatic.    Eyes:      General: No scleral icterus.  Cardiovascular:      Rate and Rhythm: Normal rate and regular rhythm.   Pulmonary:      Effort: Pulmonary effort is normal.      Breath sounds: Normal breath sounds.      Comments: Patient on 2 L nasal canula  Abdominal:      General: Bowel sounds are normal. There is distension.      Tenderness: There is no abdominal tenderness. There is no guarding or rebound.   Musculoskeletal:      Right lower leg: Edema present.      Left lower leg: Edema present.      Comments: Edema improved since admission. Slight pitting edema.  Patient not wearing compression stockings.    Skin:     General: Skin is warm and dry.      Coloration: Skin is not jaundiced.   Neurological:      Mental Status: She is alert and oriented to person, place, and time.   Psychiatric:         Mood and Affect: Mood normal.         Behavior: Behavior normal.             Significant Labs: All pertinent labs within the past 24 hours have been reviewed.    Significant Imaging: I have reviewed all pertinent imaging results/findings within the past 24 hours.

## 2024-08-05 NOTE — PLAN OF CARE
Discharge Plan A and Plan B have been determined by review of patient's clinical status, future medical and therapeutic needs, and coverage/benefits for post-acute care in coordination with multidisciplinary team members.    08/05/24 0846   Discharge Reassessment   Assessment Type Discharge Planning Reassessment   Discharge Plan discussed with: Sibling   Name(s) and Number(s) Zully Arita (Sister)  [phone] (Mobile)   Communicated RAYO with patient/caregiver Yes   Discharge Plan A Skilled Nursing Facility   DME Needed Upon Discharge  none   Transition of Care Barriers Mobility   Why the patient remains in the hospital Requires continued medical care   Post-Acute Status   Post-Acute Authorization Placement   Post-Acute Placement Status Referrals Sent   Patient choice form signed by patient/caregiver List from [facility];List from CMS Compare;List with quality metrics by geographic area provided   Discharge Delays None known at this time     CM met with patient and left VM with sister via phone  to discuss discharge planning.  Patients plan is to  SNF.    RAYO:  8/8/24      Discharge Recommendations:  moderate intensity therapy     Discharge Equipment Recommendations: (bariatric RW)     Barriers to discharge: current level of assistance required      SNF responses:   [facility], [facility] Phone: [phone]    Response: Yes, willing to accept patient  [address] [facility] [place] [address] 8/2/2024 16:37 (CT)  8/5/2024 9:34 (CT)      [facility] [place] Phone: [phone]    Response:  Yes, willing to accept patient  [address] [place] [address] 8/2/2024 16:37 (CT)  8/5/2024 9:34 (CT)              TREATMENT PLANS:    transjugular liver biopsy completed   Liver doppler  - Aldactone and Lasix for diuresis  - Favor albumin if hypotensive  - LVP   -Initial SBP Gram stain negative for WBC making SBP less likely  -studies on ascites fluid  leaned slightly  toward cardiac origin but some results pointed more towards the liver; Liver biopsy scheduled to help r/o the liver as the main cause.  Cardiology consulted and recommends a dianostic right heart cath on 8/5/24.        Follow up: PCP, Hepatology, Cardiology       Referrals:  TBD    Transportation needed: [ x] yes  No [ ]     11:10 am  CM  sent updates to accepting   facilities via [employer].        Medicaid  Response:           Debra Ga RN  Case Management  [facility]  [phone]

## 2024-08-05 NOTE — ASSESSMENT & PLAN NOTE
Volume overload of unclear etiology. Noted to have imaging findings of cirrhosis and presenting with ascites. Synthetic function appears stable. No pathological evidence of cirrhosis at this time. Paracentesis studies obtained on presentation showed:    SAAG  1.0 (does not support liver etiology) with total ascitic protein of 3.2 (does not support liver etiology).    Repeat paracentesis with removal of 10L on 7/30 showed:    SAAG of 1.5 (supports liver etiology) with total ascitic protein of 2.9 (does not support liver etiology)    Cytology pending. Clinical picture is more likely supportive of cardiac etiology of volume overload but will need liver Bx to assist in determining definitive diagnosis. AD positive with titer of 1:640 although negative anti-smooth.       Recommendations:    - Pending liver Bx and RHC  - Continue adequate diuresis and monitor output

## 2024-08-05 NOTE — TREATMENT PLAN
"Hepatology Treatment Plan    Vicky Alvarado is a 59 y.o. female admitted to hospital 7/26/2024 (Hospital Day: 11) due to Acute on chronic diastolic heart failure.     Interval History  Planned RHC and liver Bx today. Cr elevation thus diuretics held.     Objective  Temp:  [97 °F (36.1 °C)-98 °F (36.7 °C)] 97.5 °F (36.4 °C) (08/05 1227)  Pulse:  [57-79] 65 (08/05 1304)  BP: (103-142)/(56-86) 103/69 (08/05 1227)  Resp:  [15-20] 19 (08/05 1304)  SpO2:  [92 %-100 %] 95 % (08/05 1304)      Laboratory    Recent Labs   Lab 08/05/24  0504   WBC 8.55   RBC 3.68*   HGB 10.1*   HCT 33.0*      MCV 90   MCH 27.4   MCHC 30.6*      Recent Labs   Lab 08/05/24  0504   CALCIUM 9.4   ALBUMIN 2.1*   PROT 6.2      K 4.2   CO2 35*   CL 89*   BUN 34*   CREATININE 1.5*   ALKPHOS 185*   ALT 7*   AST 30   BILITOT 0.9      No results for input(s): "PT", "INR", "APTT" in the last 24 hours.     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 AD positive with titer of 1:640 although negative anti-smooth.     Problem List:  Acute on chronic diastolic heart failure  Volume overload     Plan:  - Cardiology following, RHC performed today  - " Pending liver Bx   - Continue adequate diuresis and monitor output      - We will continue to follow.    Plan of care discussed with attending Dr. Sharif. Thank you for involving us in the care of Vicky Alvarado. Please call with any additional questions, concerns or changes in the patient's clinical status.    Rosario Diehl MD  Internal Medicine, PGY-3  [facility]

## 2024-08-05 NOTE — PROGRESS NOTES
Pt arrived to MPU bay 7 s/p x-jug. Report received from WILBERT Mack.  Pt placed on continuous bedside cardiac, SpO2, and NIBP monitor. DSG and sheath to right neck intact and secured with transparent dressing. Pt waiting to proceed to cath lab. Stretcher low, locked, call bell in reach. Pt A&O, VSS.  NAD noted. Plan of care ongoing.

## 2024-08-05 NOTE — SUBJECTIVE & OBJECTIVE
Interval History: NAEON. Cr elevation to 1.5 - diuretics held. Pending Liver Bx and RHC this am.     Current Facility-Administered Medications   Medication    acetaminophen tablet 650 mg    albuterol-ipratropium 2.5 mg-0.5 mg/3 mL nebulizer solution 3 mL    ALPRAZolam tablet 0.25 mg    aluminum-magnesium hydroxide-simethicone 200-200-20 mg/5 mL suspension 30 mL    dextrose 10% bolus 125 mL 125 mL    dextrose 10% bolus 250 mL 250 mL    diclofenac sodium 1 % gel 2 g    DULoxetine DR capsule 60 mg    glucagon (human recombinant) injection 1 mg    glucose chewable tablet 16 g    glucose chewable tablet 24 g    heparin (porcine) injection 7,500 Units    melatonin tablet 3 mg    metoprolol succinate (TOPROL-XL) 24 hr tablet 100 mg    naloxone 0.4 mg/mL injection 0.02 mg    ondansetron disintegrating tablet 8 mg    pantoprazole EC tablet 40 mg    prochlorperazine injection Soln 5 mg    simethicone chewable tablet 80 mg    sodium chloride 0.9% flush 10 mL    sodium chloride 0.9% flush 10 mL     Facility-Administered Medications Ordered in Other Encounters   Medication    mupirocin 2 % ointment       Objective:     Vital Signs (Most Recent):  Temp: 97.5 °F (36.4 °C) (08/05/24 0843)  Pulse: (!) 55 (08/05/24 0940)  Resp: 18 (08/05/24 0940)  BP: 117/67 (08/05/24 0843)  SpO2: (!) 93 % (08/05/24 0940) Vital Signs (24h Range):  Temp:  [97.5 °F (36.4 °C)-98 °F (36.7 °C)] 97.5 °F (36.4 °C)  Pulse:  [55-74] 55  Resp:  [16-20] 18  SpO2:  [93 %-100 %] 93 %  BP: (103-124)/(56-67) 117/67     Weight: 101 kg (222 lb 10.6 oz) (08/04/24 0400)  Body mass index is 37.05 kg/m².       Physical Exam  Constitutional:       Appearance: She is obese.   HENT:      Head: Normocephalic and atraumatic.      Mouth/Throat:      Mouth: Mucous membranes are moist.   Eyes:      Extraocular Movements: Extraocular movements intact.      Conjunctiva/sclera: Conjunctivae normal.      Pupils: Pupils are equal, round, and reactive to light.   Cardiovascular:       Rate and Rhythm: Normal rate and regular rhythm.      Heart sounds: Normal heart sounds.   Pulmonary:      Effort: Pulmonary effort is normal. No respiratory distress.      Breath sounds: Normal breath sounds. No wheezing or rhonchi.   Abdominal:      General: There is distension.      Tenderness: There is no abdominal tenderness.   Musculoskeletal:         General: Swelling present. Normal range of motion.      Cervical back: Normal range of motion and neck supple.      Right lower leg: Edema present.      Left lower leg: Edema present.   Skin:     General: Skin is warm and dry.   Neurological:      Mental Status: She is alert and oriented to person, place, and time.   Psychiatric:         Mood and Affect: Mood normal.            MELD 3.0: 12 at 7/29/2024  2:27 PM  MELD-Na: 9 at 7/29/2024  2:27 PM  Calculated from:  Serum Creatinine: 1.2 mg/dL at 7/29/2024  2:27 PM  Serum Sodium: 141 mmol/L (Using max of 137 mmol/L) at 7/29/2024  2:27 PM  Total Bilirubin: 0.6 mg/dL (Using min of 1 mg/dL) at 7/28/2024  5:10 AM  Serum Albumin: 2.4 g/dL at 7/29/2024  2:27 PM  INR(ratio): 1.1 at 7/27/2024  4:35 AM  Age at listing (hypothetical): 59 years  Sex: Female at 7/29/2024  2:27 PM      Significant Labs:  CBC:   Recent Labs   Lab 08/05/24  0504   WBC 8.55   RBC 3.68*   HGB 10.1*   HCT 33.0*        BMP:   Recent Labs   Lab 08/05/24  0504   GLU 81      K 4.2   CL 89*   CO2 35*   BUN 34*   CREATININE 1.5*   CALCIUM 9.4       Significant Imaging:  Labs: Reviewed

## 2024-08-05 NOTE — OP NOTE
Patient was brought to cath lab, draped, and prepped in the usual sterile fashion. Right IJ sheath from liver biopsy was in place. Using an exchange wire the 9 Fr sheath was exchanged for a 7 Fr sheath. [place] Altagracia catheter was introduced via the sheath. Measurements were obtained, and sheath was removed. Patient tolerated the procedure well and discharged in stable condition.'

## 2024-08-06 LAB
AFP SERPL-MCNC: <2 NG/ML (ref 0–8.4)
ALBUMIN SERPL BCP-MCNC: 2.2 G/DL (ref 3.5–5.2)
ALP SERPL-CCNC: 178 U/L (ref 55–135)
ALT SERPL W/O P-5'-P-CCNC: 6 U/L (ref 10–44)
AMYLASE, BODY FLUID: 15 U/L
ANION GAP SERPL CALC-SCNC: 10 MMOL/L (ref 8–16)
AST SERPL-CCNC: 27 U/L (ref 10–40)
BASOPHILS # BLD AUTO: 0.04 K/UL (ref 0–0.2)
BASOPHILS NFR BLD: 0.5 % (ref 0–1.9)
BILIRUB FLD-MCNC: 0.5 MG/DL
BILIRUB SERPL-MCNC: 0.8 MG/DL (ref 0.1–1)
BODY FLUID SOURCE AMYLASE: NORMAL
BODY FLUID SOURCE, BILIRUBIN: NORMAL
BODY FLUID SOURCE, CREATININE: NORMAL
BODY FLUID SOURCE, LDH: NORMAL
BUN SERPL-MCNC: 35 MG/DL (ref 6–20)
CALCIUM SERPL-MCNC: 9.5 MG/DL (ref 8.7–10.5)
CANCER AG125 SERPL-ACNC: 247 U/ML (ref 0–30)
CHLORIDE SERPL-SCNC: 89 MMOL/L (ref 95–110)
CO2 SERPL-SCNC: 34 MMOL/L (ref 23–29)
CREAT FLD-MCNC: 1.3 MG/DL
CREAT SERPL-MCNC: 1.3 MG/DL (ref 0.5–1.4)
DIFFERENTIAL METHOD BLD: ABNORMAL
EOSINOPHIL # BLD AUTO: 0.3 K/UL (ref 0–0.5)
EOSINOPHIL NFR BLD: 3 % (ref 0–8)
ERYTHROCYTE [DISTWIDTH] IN BLOOD BY AUTOMATED COUNT: 14.3 % (ref 11.5–14.5)
EST. GFR  (NO RACE VARIABLE): 47.1 ML/MIN/1.73 M^2
FINAL PATHOLOGIC DIAGNOSIS: NORMAL
FINAL PATHOLOGIC DIAGNOSIS: NORMAL
GLUCOSE FLD-MCNC: 79 MG/DL
GLUCOSE SERPL-MCNC: 85 MG/DL (ref 70–110)
GROSS: NORMAL
HCT VFR BLD AUTO: 31.1 % (ref 37–48.5)
HGB BLD-MCNC: 9.7 G/DL (ref 12–16)
IMM GRANULOCYTES # BLD AUTO: 0.06 K/UL (ref 0–0.04)
IMM GRANULOCYTES NFR BLD AUTO: 0.7 % (ref 0–0.5)
LDH FLD L TO P-CCNC: 168 U/L
LYMPHOCYTES # BLD AUTO: 0.8 K/UL (ref 1–4.8)
LYMPHOCYTES NFR BLD: 8.7 % (ref 18–48)
Lab: NORMAL
Lab: NORMAL
MAGNESIUM SERPL-MCNC: 2 MG/DL (ref 1.6–2.6)
MCH RBC QN AUTO: 28 PG (ref 27–31)
MCHC RBC AUTO-ENTMCNC: 31.2 G/DL (ref 32–36)
MCV RBC AUTO: 90 FL (ref 82–98)
MONOCYTES # BLD AUTO: 1.1 K/UL (ref 0.3–1)
MONOCYTES NFR BLD: 12.5 % (ref 4–15)
NEUTROPHILS # BLD AUTO: 6.4 K/UL (ref 1.8–7.7)
NEUTROPHILS NFR BLD: 74.6 % (ref 38–73)
NRBC BLD-RTO: 0 /100 WBC
PHOSPHATE SERPL-MCNC: 3.9 MG/DL (ref 2.7–4.5)
PLATELET # BLD AUTO: 233 K/UL (ref 150–450)
PMV BLD AUTO: 10.6 FL (ref 9.2–12.9)
POCT GLUCOSE: 106 MG/DL (ref 70–110)
POCT GLUCOSE: 122 MG/DL (ref 70–110)
POTASSIUM SERPL-SCNC: 4.2 MMOL/L (ref 3.5–5.1)
PROT FLD-MCNC: 2.2 G/DL
PROT SERPL-MCNC: 6.5 G/DL (ref 6–8.4)
RBC # BLD AUTO: 3.47 M/UL (ref 4–5.4)
SODIUM SERPL-SCNC: 133 MMOL/L (ref 136–145)
SPECIMEN SOURCE: NORMAL
SPECIMEN SOURCE: NORMAL
WBC # BLD AUTO: 8.63 K/UL (ref 3.9–12.7)

## 2024-08-06 PROCEDURE — 82570 ASSAY OF URINE CREATININE: CPT

## 2024-08-06 PROCEDURE — 25000003 PHARM REV CODE 250

## 2024-08-06 PROCEDURE — 85025 COMPLETE CBC W/AUTO DIFF WBC: CPT

## 2024-08-06 PROCEDURE — 27100171 HC OXYGEN HIGH FLOW UP TO 24 HOURS

## 2024-08-06 PROCEDURE — 82105 ALPHA-FETOPROTEIN SERUM: CPT

## 2024-08-06 PROCEDURE — 84157 ASSAY OF PROTEIN OTHER: CPT

## 2024-08-06 PROCEDURE — 84100 ASSAY OF PHOSPHORUS: CPT

## 2024-08-06 PROCEDURE — 36415 COLL VENOUS BLD VENIPUNCTURE: CPT

## 2024-08-06 PROCEDURE — 0W9G3ZZ DRAINAGE OF PERITONEAL CAVITY, PERCUTANEOUS APPROACH: ICD-10-PCS | Performed by: INTERNAL MEDICINE

## 2024-08-06 PROCEDURE — 27000221 HC OXYGEN, UP TO 24 HOURS

## 2024-08-06 PROCEDURE — P9047 ALBUMIN (HUMAN), 25%, 50ML: HCPCS | Mod: JZ,JG

## 2024-08-06 PROCEDURE — 94660 CPAP INITIATION&MGMT: CPT

## 2024-08-06 PROCEDURE — 82247 BILIRUBIN TOTAL: CPT

## 2024-08-06 PROCEDURE — 82945 GLUCOSE OTHER FLUID: CPT

## 2024-08-06 PROCEDURE — 94799 UNLISTED PULMONARY SVC/PX: CPT

## 2024-08-06 PROCEDURE — 63600175 PHARM REV CODE 636 W HCPCS

## 2024-08-06 PROCEDURE — 99499 UNLISTED E&M SERVICE: CPT | Mod: 95,,, | Performed by: INTERNAL MEDICINE

## 2024-08-06 PROCEDURE — 80053 COMPREHEN METABOLIC PANEL: CPT | Performed by: STUDENT IN AN ORGANIZED HEALTH CARE EDUCATION/TRAINING PROGRAM

## 2024-08-06 PROCEDURE — 99900035 HC TECH TIME PER 15 MIN (STAT)

## 2024-08-06 PROCEDURE — 94761 N-INVAS EAR/PLS OXIMETRY MLT: CPT

## 2024-08-06 PROCEDURE — 82150 ASSAY OF AMYLASE: CPT

## 2024-08-06 PROCEDURE — 20600001 HC STEP DOWN PRIVATE ROOM

## 2024-08-06 PROCEDURE — 83735 ASSAY OF MAGNESIUM: CPT

## 2024-08-06 PROCEDURE — 83615 LACTATE (LD) (LDH) ENZYME: CPT

## 2024-08-06 PROCEDURE — 86304 IMMUNOASSAY TUMOR CA 125: CPT

## 2024-08-06 PROCEDURE — 63600175 PHARM REV CODE 636 W HCPCS: Mod: JZ,JG

## 2024-08-06 RX ORDER — ALBUMIN HUMAN 250 G/1000ML
SOLUTION INTRAVENOUS
Status: COMPLETED
Start: 2024-08-06 | End: 2024-08-06

## 2024-08-06 RX ORDER — BACLOFEN 5 MG/1
5 TABLET ORAL ONCE
Status: COMPLETED | OUTPATIENT
Start: 2024-08-06 | End: 2024-08-06

## 2024-08-06 RX ADMIN — ALBUMIN (HUMAN) 50 G: 12.5 SOLUTION INTRAVENOUS at 01:08

## 2024-08-06 RX ADMIN — ALBUMIN (HUMAN) 25 G: 12.5 SOLUTION INTRAVENOUS at 01:08

## 2024-08-06 RX ADMIN — ALPRAZOLAM 0.25 MG: 0.25 TABLET ORAL at 05:08

## 2024-08-06 RX ADMIN — METOPROLOL SUCCINATE 100 MG: 100 TABLET, EXTENDED RELEASE ORAL at 08:08

## 2024-08-06 RX ADMIN — ONDANSETRON 8 MG: 8 TABLET, ORALLY DISINTEGRATING ORAL at 04:08

## 2024-08-06 RX ADMIN — SIMETHICONE 80 MG: 80 TABLET, CHEWABLE ORAL at 05:08

## 2024-08-06 RX ADMIN — Medication 3 MG: at 09:08

## 2024-08-06 RX ADMIN — PANTOPRAZOLE SODIUM 40 MG: 40 TABLET, DELAYED RELEASE ORAL at 08:08

## 2024-08-06 RX ADMIN — METOPROLOL SUCCINATE 100 MG: 100 TABLET, EXTENDED RELEASE ORAL at 09:08

## 2024-08-06 RX ADMIN — BACLOFEN 5 MG: 5 TABLET ORAL at 04:08

## 2024-08-06 RX ADMIN — ALPRAZOLAM 0.25 MG: 0.25 TABLET ORAL at 09:08

## 2024-08-06 RX ADMIN — SODIUM CHLORIDE 250 ML: 9 INJECTION, SOLUTION INTRAVENOUS at 08:08

## 2024-08-06 RX ADMIN — ALPRAZOLAM 0.25 MG: 0.25 TABLET ORAL at 10:08

## 2024-08-06 RX ADMIN — HEPARIN SODIUM 7500 UNITS: 5000 INJECTION INTRAVENOUS; SUBCUTANEOUS at 06:08

## 2024-08-06 RX ADMIN — PROCHLORPERAZINE EDISYLATE 5 MG: 5 INJECTION INTRAMUSCULAR; INTRAVENOUS at 05:08

## 2024-08-06 RX ADMIN — ACETAMINOPHEN 650 MG: 325 TABLET ORAL at 10:08

## 2024-08-06 RX ADMIN — ACETAMINOPHEN 650 MG: 325 TABLET ORAL at 09:08

## 2024-08-06 RX ADMIN — HEPARIN SODIUM 7500 UNITS: 5000 INJECTION INTRAVENOUS; SUBCUTANEOUS at 03:08

## 2024-08-06 RX ADMIN — DULOXETINE HYDROCHLORIDE 60 MG: 30 CAPSULE, DELAYED RELEASE ORAL at 08:08

## 2024-08-06 NOTE — TREATMENT PLAN
Hepatology Treatment Plan    Vicky Alvarado is a 59 y.o. female admitted to hospital 7/26/2024 (Hospital Day: 12) due to Acute on chronic diastolic heart failure.     Interval History  NAEON. S/P liver Bx with pressure measurements and RHC. Liver Bx pressure measurements without portal HTN and RHC with normal BV pressures.     Objective  Temp:  [97.4 °F (36.3 °C)-98.6 °F (37 °C)] 97.9 °F (36.6 °C) (08/06 1449)  Pulse:  [58-81] 76 (08/06 1449)  BP: ()/(52-77) 99/55 (08/06 1449)  Resp:  [16-27] 16 (08/06 1449)  SpO2:  [80 %-100 %] 98 % (08/06 1449)    Laboratory    Recent Labs   Lab 08/06/24  0630   WBC 8.63   RBC 3.47*   HGB 9.7*   HCT 31.1*      MCV 90   MCH 28.0   MCHC 31.2*      Recent Labs   Lab 08/06/24  0630   CALCIUM 9.5   ALBUMIN 2.2*   PROT 6.5   *   K 4.2   CO2 34*   CL 89*   BUN 35*   CREATININE 1.3   ALKPHOS 178*   ALT 6*   AST 27   BILITOT 0.8        MELD 3.0: 12 at 7/29/2024  2:27 PM  MELD-Na: 9 at 7/29/2024  2:27 PM  Calculated from:  Serum Creatinine: 1.2 mg/dL at 7/29/2024  2:27 PM  Serum Sodium: 141 mmol/L (Using max of 137 mmol/L) at 7/29/2024  2:27 PM  Total Bilirubin: 0.6 mg/dL (Using min of 1 mg/dL) at 7/28/2024  5:10 AM  Serum Albumin: 2.5 g/dL at 7/28/2024  4:08 PM  INR(ratio): 1.1 at 7/27/2024  4:35 AM  Age at listing (hypothetical): 59 years  Sex: Female at 7/29/2024  2:27 PM         Assessment and Plan    59-year-old female with medical history of 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 S/P RHC with normal BV pressures. Liver Bx pending as well as paracentesis cytology although ascitic fluid studies did not support hepatic etiology and Wedge pressures noted on liver Bx pressure measurements did not show significant portal HTN.    Problem List:  Volume  overload  2.   Recurrent ascites  3.   Imaging findings suggestive of cirrhosis  4.   Charted Hx of PHG    Plan:    - Follow up pending paracentesis cytology  - Follow up pending liver Bx pathology  - Obtain MRI with contrast of abdomen and pelvis (malignancy consideration)      - We will continue to follow.    Plan of care discussed with attending Dr. Sharif. Thank you for involving us in the care of Vicky Alvarado. Please call with any additional questions, concerns or changes in the patient's clinical status.      Rosario Diehl MD  Hepatology  [facility] ([place])

## 2024-08-06 NOTE — PLAN OF CARE
Problem: Adult Inpatient Plan of Care  Goal: Plan of Care Review  Outcome: Progressing  Goal: Patient-Specific Goal (Individualized)  Outcome: Progressing  Goal: Absence of Hospital-Acquired Illness or Injury  Outcome: Progressing  Goal: Optimal Comfort and Wellbeing  Outcome: Progressing  Goal: Readiness for Transition of Care  Outcome: Progressing     Problem: Bariatric Environmental Safety  Goal: Safety Maintained with Care  Outcome: Progressing     Problem: Diabetes Comorbidity  Goal: Blood Glucose Level Within Targeted Range  Outcome: Progressing     Problem: Skin Injury Risk Increased  Goal: Skin Health and Integrity  Outcome: Progressing     Problem: Wound  Goal: Optimal Coping  Outcome: Progressing  Goal: Optimal Functional Ability  Outcome: Progressing  Goal: Absence of Infection Signs and Symptoms  Outcome: Progressing  Goal: Improved Oral Intake  Outcome: Progressing  Goal: Optimal Pain Control and Function  Outcome: Progressing  Goal: Skin Health and Integrity  Outcome: Progressing  Goal: Optimal Wound Healing  Outcome: Progressing     Problem: Pain Acute  Goal: Optimal Pain Control and Function  Outcome: Progressing     Problem: Liver Failure  Goal: Optimal Coping with Liver Failure  Outcome: Progressing  Goal: Fluid and Electrolyte Balance  Outcome: Progressing  Goal: Optimal Gastrointestinal Function  Outcome: Progressing  Goal: Blood Glucose Level Within Target Range  Outcome: Progressing  Goal: Optimal Coagulation Function  Outcome: Progressing  Goal: Absence of Infection Signs and Symptoms  Outcome: Progressing  Goal: Optimal Neurologic Function  Outcome: Progressing  Goal: Improved Oral Intake  Outcome: Progressing  Goal: Optimal Pain Control, Comfort and Function  Outcome: Progressing  Goal: Optimize Renal Function  Outcome: Progressing  Goal: Effective Oxygenation and Ventilation  Outcome: Progressing     Problem: Diarrhea  Goal: Effective Diarrhea Management  Outcome: Progressing     Patient  alert and oriented. Cooperative with care. Medicated as ordered. Paracentesis done this shift. Per patient 10L removed. No report called from IR. Patient c/o back pain. New order for baclofen given. Patient also c/o nausea. Zofran given. Patient continues to c/o nausea. Compazine given. Awaiting results. Resident in room to see patient d/t patient c/o pain in belly after para.

## 2024-08-06 NOTE — PROGRESS NOTES
Samir Koch - Stepdown Flex ([place] [id number])  [facility] Medicine  Progress Note    Patient Name: Vicky Alvarado  MRN: [id number]  Patient Class: IP- Inpatient   Admission Date: 7/26/2024  Length of Stay: 10 days  Attending Physician: Cory Hairston, *  Primary Care Provider: Gordy Cordero MD        Subjective:     Principal Problem:Anasarca        HPI:  Patient is a 59F with CHF (EF 60%, G2DD), HTN, Afib (s/p watchman procedure), T2DM (prev. on tirzepatide) presents to the ED w/ minor fall and progressively worsening SOB. Notes she couldn't get up after her fall, came in in a wheelchair. Notes she has had SOB for the past month with exertion, though now it is with rest as well. Reports orthopnea during this time frame. States she does not have any pain with breathing, but does additionally endorse occasional, intermittent chest pain. Denies abdominal pain, fever, or chills. States she has early satiety. No prior admission for HF exacerbation. Reports experiencing b/l lower extremity edema previously, but denies prior abdominal swelling. Swelling precluding normal ambulation, using walker (previously for balance) to assist d.t weakness. Has not missed doses of home lasix or antihypertensive medications. No reduced urine output at home.     In ED, normotensive, no tachycardia, some tachypnea (RR 22-28), initially on NRB -> HFNC 12L -> BIPAP -> 6L. No desaturations recorded during transitions in O2 therapy (BIPAP primarily placed for pulmonary edema seen on CXR). CBC with normocytic anemia (Hb 10.6), no leukocytosis. CMP with hypokalemia K 2.9, Cr 1.8/BUN 27 (baseline appears to be 1-1.4), Alb 2.7, no LFT elevation. Mg 1.5. , Troponin normal. VBG 7.46 / 42. CT AP with cirrhotic liver morphology, questionable GB sludge/stones, large volume ascites. CXR with hypoventilatory changes, R>L perihilar infiltrates c/f CHF. EKG NSR.  Received K replacement, 100mg IV Lasix in ED.       Overview/Hospital  Course:  Patient is here for SOB due to volume overload. Patient has had CHF exacerbation in the past but also has evidence of a cirrhosis requiring a paracentesis in the ED in which they removed 7 L of fluid. The ascitic fluid was sent to lab and there is no evidence of SBP at this time. Abdominal CT shows a cirrhotic morphology of the liver. US/doppler of  liver shows evidence of cirrhosis, splenotmegally, and portal hypertension. She shows no signs of jaundice. Her liver enzymes, Alk Phos, and bilirubin are wnl. Protein studies of the ascitic fluid show increased protein leaning more towards a cardiac etiology.  CXR shows heart to be boarderline in regards to being enlarged and some perihilar infiltrate. ECHO on (7/29/24) showed intermediate diastolic dysfunction with EF 60-65%, minor aortic stenosis, and moderate pulmonary hypertension with estimated pulmonary artery systolic pressure is 54 mmHg.Paracentesis performed (7/30/24) removed an additional 9.8L.  She has had some problems with confusion overnight. Believe that home BIPAP slipping off and patient not getting adequate saturation may be playing a role. Oriented by morning. Respiratory therapy switched out her mask and saturations overnight improved. Studies on ascitic fluid from paracentesis have been somewhat mixed in results. SAAG on initial paracentesis was 1.0 with a total protein of 3.2. Ascitic fluid studies on the next paracentesis (that pulled out 9.8 L) showed a SAAG of 1.5 and total protein of 2.9. IR consulted for liver biopsy to help r/o the liver as the cause of ascities. Right heart cath performed 8/5/24 and it showed:  normal biventricular filling pressures, borderline low CI/CO, and mild pre-capillary pulmonary HTN. IR performed liver biopsy 7/5/24; results pending. PT/OT recommends acute skilled PT after discharge. Hepatology does not feel that fluid overload is 2/2 to cirrhosis. Will pursue other workup.     Interval History: Liver  biopsy results pending.       Review of Systems   Constitutional:  Negative for chills and diaphoresis.   Respiratory:  Negative for cough and shortness of breath.    Cardiovascular:  Negative for chest pain, palpitations and leg swelling.   Gastrointestinal:  Positive for abdominal distention and diarrhea. Negative for abdominal pain, blood in stool, nausea and vomiting.   Genitourinary:  Negative for difficulty urinating, dysuria and hematuria.   Musculoskeletal:  Positive for arthralgias and joint swelling.        Complains of some discomfort in her feet. One toe on her right foot looks slightly erythematous around the joint   Neurological:  Negative for dizziness and headaches.     Objective:     Vital Signs (Most Recent):  Temp: 97.9 °F (36.6 °C) (08/06/24 1449)  Pulse: 76 (08/06/24 1449)  Resp: 16 (08/06/24 1449)  BP: (!) 99/55 (08/06/24 1449)  SpO2: 98 % (08/06/24 1449) Vital Signs (24h Range):  Temp:  [97.4 °F (36.3 °C)-98.6 °F (37 °C)] 97.9 °F (36.6 °C)  Pulse:  [58-81] 76  Resp:  [16-27] 16  SpO2:  [80 %-100 %] 98 %  BP: ()/(52-77) 99/55     Weight: 101 kg (222 lb 10.6 oz)  Body mass index is 37.05 kg/m².    Intake/Output Summary (Last 24 hours) at 8/6/2024 1455  Last data filed at 8/6/2024 1322  Gross per 24 hour   Intake --   Output 96349 ml   Net -87918 ml         Physical Exam  Constitutional:       General: She is not in acute distress.     Appearance: She is obese. She is not ill-appearing.   HENT:      Head: Normocephalic and atraumatic.   Eyes:      General: No scleral icterus.  Cardiovascular:      Rate and Rhythm: Normal rate and regular rhythm.   Pulmonary:      Effort: Pulmonary effort is normal.      Breath sounds: Normal breath sounds.      Comments: Patient on 2 L nasal canula  Abdominal:      General: Bowel sounds are normal. There is distension.      Tenderness: There is no abdominal tenderness. There is no guarding or rebound.   Musculoskeletal:      Right lower leg: Edema present.       Left lower leg: Edema present.      Comments: Edema improved since admission. Slight pitting edema.  Patient not wearing compression stockings.    Skin:     General: Skin is warm and dry.      Coloration: Skin is not jaundiced.   Neurological:      Mental Status: She is alert and oriented to person, place, and time.   Psychiatric:         Mood and Affect: Mood normal.         Behavior: Behavior normal.           Significant Labs: All pertinent labs within the past 24 hours have been reviewed.    Significant Imaging: I have reviewed all pertinent imaging results/findings within the past 24 hours.     Assessment/Plan:      * Anasarca  Concerned anasarca may represent sequelae of cirrhosis. EGD 8/2021 with Portal hypertensive gastropathy. No varices. No abd pain, fever, chills, or confusion to raise concern for SBP or HE respectively    S/p liver biopsy, pending path.   S/p x3 para 8/6    - Favor albumin if hypotensive  - LVP   -Initial SBP Gram stain negative for WBC making SBP less likely  -studies on ascites fluid  leaned slightly toward cardiac origin but some results pointed more towards the liver;   - Pending MRI A/P for further evaluation     Right heart cath performed by cardiology; Results: Right heart cath found: normal biventricular filling pressures, borderline low CI/CO, and mild pre-capillary pulmonary HTN.     Acute on chronic diastolic heart failure  Cardiorenal syndrome  Acute HF exacerbation of uncertain etiology (no missed doses of lasix). Last TTE with EF 60% and G2DD. Concern that she may have new R-sided HF given anasarca (large volume ascites) versus now concomitant MASH cirrhosis compounding volume overloaded state and therefore needs higher diuresis at baseline. Suspect JHONATAN d.t overload - CRS.     Total 17L removed via para + 8L UOP  TTE with intermediate diastolic dysfunction with EF 60-65%, minor aortic stenosis, and moderate pulmonary hypertension with estimated pulmonary artery  systolic pressure is 54 mmHg.    - Intravascularly depleted, holding lasix 80mg IV and spironolactone 50mg  - RFP BID  - BIPAP qhs and during naps     - Fluid restriction at 1200 cc with strict I/Os and daily weights   - Check Electrolytes, keep Mag >2 & K+ >4  - Ambulate as tolerated      Stage 3b chronic kidney disease  Improved    Creatine stable for now. BMP reviewed- noted Estimated Creatinine Clearance: 47 mL/min (A) (based on SCr of 1.5 mg/dL (H)). according to latest data. Based on current GFR, CKD stage is stage 3 - GFR 30-59.  Monitor UOP and serial BMP and adjust therapy as needed. Renally dose meds. Avoid nephrotoxic medications and procedures.    Currently holding diuretics due to dip in kidney function    NAFLD (nonalcoholic fatty liver disease)  Seen by Dr. Saba with hepatology for previous chronic mild elevation in LFT, determined most likely NADLF. Now with cirrhotic morphology on CT. Previous EGD with portal gastropathy. LFT normal on admit. 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     - spironolactone 25mg and Lasix 80 BID holding diuretics due to dip in kidney function  -IR consulted; performed liver biopsy; awaiting results.      Volume overload  IR consulted for diagnostic liver biopsy to help r/o the liver as a primary cause  Biopsy performed 8/5/24; awaiting results    Cardiology consulted and performed  diagnostic heart cath on 8/5/24.    Right Heart Cath Results: normal biventricular filling pressures, borderline low CI/CO, and mild pre-capillary pulmonary HTN.         Type 2 diabetes mellitus with diabetic polyneuropathy, without long-term current use of insulin  Patient's FSGs are controlled on current medication regimen.  Last A1c reviewed-   Lab Results   Component Value Date    HGBA1C 4.9 07/27/2024     Blood sugars are staying in the 80s-90s  Current correctional scale   holding as BG stable    Hold Oral hypoglycemics while patient is in the  hospital.    Atrial Fibrillation (paroxysmal)  Patient with Paroxysmal (<7 days) atrial fibrillation which is controlled currently with Beta Blocker (Metoprolol succinate 100 mg BID) Patient is currently in sinus rhythm.[id number] Score: 3. Anticoagulation not indicated due to has Watchman .    Essential hypertension  Chronic, controlled. Latest blood pressure and vitals reviewed-     Temp:  [97 °F (36.1 °C)-97.7 °F (36.5 °C)]   Pulse:  [57-79]   Resp:  [15-27]   BP: (103-142)/(56-86)   SpO2:  [92 %-100 %] .   Home meds for hypertension were reviewed and noted below.   Hypertension Medications               furosemide (LASIX) 40 MG tablet Take 1 tablet (40 mg total) by mouth 2 (two) times daily. Resume as needed for edema until followup with your PCP.    lisinopriL (PRINIVIL,ZESTRIL) 40 MG tablet Take 1 tablet (40 mg total) by mouth once daily.    metoprolol succinate (TOPROL-XL) 100 MG 24 hr tablet Take 1 tablet (100 mg total) by mouth 2 (two) times daily.    NIFEdipine (PROCARDIA-XL) 60 MG (OSM) 24 hr tablet Take 1 tablet (60 mg total) by mouth once daily.            While in the hospital, will manage blood pressure as follows; Adjust home antihypertensive regimen as follows- Continue toprol for afib, hold other antihypertensives d/t need for aggressive diuresis plus presence of JHONATAN. Did add spironolactone for K retention and now presence of ascites in setting of possible cirrhosis     Will utilize p.r.n. blood pressure medication only if patient's blood pressure greater than 220/110 and she develops symptoms such as worsening chest pain or shortness of breath.      VTE Risk Mitigation (From admission, onward)           Ordered     heparin (porcine) injection 7,500 Units  Every 8 hours         07/29/24 1202     IP VTE HIGH RISK PATIENT  Once         07/27/24 0351     Place sequential compression device  Until discontinued         07/27/24 0351                    Discharge Planning   RAYO: 8/8/2024     Code Status:  Full Code   Is the patient medically ready for discharge?:     Reason for patient still in hospital (select all that apply): Treatment  Discharge Plan A: Skilled Nursing Facility   Discharge Delays: None known at this time              Delmar Ross MD  Department of Hospital Medicine   [facility] - Stepdown Flex ([place]-[facility])

## 2024-08-06 NOTE — H&P
Inpatient Radiology Pre-procedure Note    History of Present Illness:  Vicky Alvarado is a 59 y.o. female who presents for US guided paracentesis.    Admission H&P reviewed.  Past Medical History:   Diagnosis Date    Anticoagulant long-term use     Arthritis     Atrial fibrillation     cardioversion    Atrial fibrillation with RVR     HAS WATCHMAN IN PLACE NOW    Avascular necrosis     L hand    CHF (congestive heart failure)     Chronic fatigue     Depression     Diabetes mellitus     Encounter for blood transfusion 07/22/2020    Encounter for blood transfusion 03/2022    Fatty liver     GERD (gastroesophageal reflux disease)     Hx of psychiatric care     Hypertension     Iron deficiency anemia 05/07/2022    TIBC 444 with saturated iron 8    Liver disease     Obese     Pre-diabetes 05/07/2022    Psychiatric problem     Sleep apnea     wears cpap    SOB (shortness of breath) on exertion     Weight loss     75lb intentional weight loss     Past Surgical History:   Procedure Laterality Date    ABLATION OF ARRHYTHMOGENIC FOCUS FOR ATRIAL FIBRILLATION N/A 07/20/2020    Procedure: Ablation atrial fibrillation;  Surgeon: Gabriel Hawley MD;  Location: [facility] EP LAB;  Service: Cardiology;  Laterality: N/A;  afib, PVI, RFA, REENA, SHADY, anes, MB, 3 Prep    ABLATION OF ARRHYTHMOGENIC FOCUS FOR ATRIAL FIBRILLATION N/A 01/24/2022    Procedure: Ablation atrial fibrillation;  Surgeon: Gabriel Hawley MD;  Location: [facility] EP LAB;  Service: Cardiology;  Laterality: N/A;  afib/afl, PVI (re-do)/CTI, RFA, REENA (cx if SR), SHADY, anes, MB, 3 Prep    APPLICATION OF WOUND VACUUM-ASSISTED CLOSURE DEVICE Left 08/03/2020    Procedure: APPLICATION, WOUND VAC;  Surgeon: SEMAJ Márquez III, MD;  Location: [facility] OR [address] [place] [address];  Service: Peripheral Vascular;  Laterality: Left;    APPLICATION OF WOUND VACUUM-ASSISTED CLOSURE DEVICE Left 08/06/2020    Procedure: APPLICATION, WOUND VAC;  Surgeon: SEMAJ Márquez III, MD;  Location: [facility] OR  2ND FLR;  Service: Peripheral Vascular;  Laterality: Left;    CARPAL TUNNEL RELEASE Right 06/10/2020    Procedure: RELEASE, CARPAL TUNNEL - RIGHT;  Surgeon: Adelaida Hall MD;  Location: [facility] [place] OR;  Service: Orthopedics;  Laterality: Right;  GENERAL AND REGIONAL    CARPAL TUNNEL RELEASE Left 05/05/2021    Procedure: RELEASE, CARPAL TUNNEL, LEFT;  Surgeon: Adelaida Hall MD;  Location: [facility] [place] OR;  Service: Orthopedics;  Laterality: Left;  GENERAL & REGIONAL IN PACU    CARPECTOMY Left 9/6/2023    Procedure: CARPECTOMY;  Surgeon: Adelaida Hall MD;  Location: [facility] [place] OR;  Service: Orthopedics;  Laterality: Left;  MAC/REGIONAL  LEFT PROXIMAL ROW    CLOSURE OF WOUND Left 08/06/2020    Procedure: CLOSURE, WOUND;  Surgeon: SEMAJ Márquez III, MD;  [address]  Service: Peripheral Vascular;  Laterality: Left;  Complex    COLONOSCOPY N/A 08/17/2021    Procedure: COLONOSCOPY Suprep;  Surgeon: Loco Deluca MD;  Location: [facility];  Service: Endoscopy;  Laterality: N/A;    ECHOCARDIOGRAM,TRANSESOPHAGEAL N/A 07/24/2023    Procedure: Transesophageal echo (REENA) intra-procedure log documentation;  Surgeon: [facility];  Location: [facility] EP LAB;  Service: Cardiology;  Laterality: N/A;  s/p WM, REENA, anes, 3 Prep    ESOPHAGOGASTRODUODENOSCOPY N/A 08/17/2021    Procedure: EGD (ESOPHAGOGASTRODUODENOSCOPY);  Surgeon: Loco Deluca MD;  Location: [facility];  Service: Endoscopy;  Laterality: N/A;  Patient is schedule to have her Covid test on 08/14/2021 at the [facility] @ 9:30am. [place].    EXPLORATION OF FEMORAL ARTERY Left 07/21/2020    Procedure: EXPLORATION, ARTERY, FEMORAL;  Surgeon: SEMAJ Márquez III, MD;  Location: [facility] 2ND FLR;  Service: Peripheral Vascular;  Laterality: Left;  1. Urgent Direct repair, L SFA branch laceration    FOOT SURGERY      HYSTERECTOMY      HYSTEROSCOPY WITH DILATION AND CURETTAGE OF UTERUS N/A 02/19/2022    Procedure: HYSTEROSCOPY,  WITH DILATION AND CURETTAGE OF UTERUS;  Surgeon: Shane Palomo MD;  Location: [facility] OR [facility] FLR;  Service: OB/GYN;  Laterality: N/A;    INCISION AND DRAINAGE OF KNEE Left 05/12/2022    Procedure: INCISION AND DRAINAGE, Prepatellar bursectomy.;  Surgeon: Dre Guzmán MD;  Location: [facility] 2ND FLR;  Service: Orthopedics;  Laterality: Left;    LEFT HEART CATHETERIZATION Left 02/07/2023    Procedure: Left heart cath;  Surgeon: Josiah Terry MD;  Location: [facility] CATH LAB;  Service: Cardiology;  Laterality: Left;  borderline moderate bleeding risk 6.3%    OCCLUSION OF LEFT ATRIAL APPENDAGE N/A 06/12/2023    Procedure: Left atrial appendage occlusion;  Surgeon: Gabriel Hawley MD;  Location: [facility] EP LAB;  Service: Cardiology;  Laterality: N/A;  afib, watchman, BSCI, demi, fred, MB, 3prep    RELEASE OF ULNAR NERVE AT CUBITAL TUNNEL Bilateral     ROBOT-ASSISTED LAPAROSCOPIC ABDOMINAL HYSTERECTOMY USING DA KEIRY XI N/A 08/09/2022    Procedure: XI ROBOTIC HYSTERECTOMY;  Surgeon: Yolanda Barriga MD;  Location: [facility];  Service: OB/GYN;  Laterality: N/A;  dual console requested    ROBOT-ASSISTED LAPAROSCOPIC SALPINGO-OOPHORECTOMY Bilateral 08/09/2022    Procedure: ROBOTIC SALPINGO-OOPHORECTOMY;  Surgeon: Yolanda Barriga MD;  Location: [facility];  Service: OB/GYN;  Laterality: Bilateral;    TRANSESOPHAGEAL ECHOCARDIOGRAPHY N/A 06/12/2023    Procedure: ECHOCARDIOGRAM, TRANSESOPHAGEAL;  Surgeon: Cyril Mast MD;  Location: [facility] EP LAB;  Service: Cardiology;  Laterality: N/A;    TREATMENT OF CARDIAC ARRHYTHMIA N/A 01/29/2020    Procedure: CARDIOVERSION;  Surgeon: Gabreil Hawley MD;  Location: [facility] EP LAB;  Service: Cardiology;  Laterality: N/A;  af, demi, dccv, anes, mb, 345    TREATMENT OF CARDIAC ARRHYTHMIA  01/24/2022    Procedure: Cardioversion or Defibrillation;  Surgeon: Gabriel Hawley MD;  Location: [facility] EP LAB;  Service: Cardiology;;    WOUND DEBRIDEMENT Left 08/06/2020    Procedure: DEBRIDEMENT,  WOUND;  Surgeon: SEMAJ Márquez III, MD;  Location: [facility] OR [address] [place] [address];  Service: Peripheral Vascular;  Laterality: Left;       Review of Systems:   As documented in primary team H&P    Home Meds:   Prior to Admission medications    Medication Sig Start Date End Date Taking? Authorizing Provider   acetaminophen (TYLENOL) 500 MG tablet Take 2 tablets (1,000 mg total) by mouth 2 (two) times daily as needed for Pain. Begin post op day 3.  Patient taking differently: Take 1,000 mg by mouth every evening. Begin post op day 3. 9/5/23  Yes Echo Aguirre PA-C   albuterol (VENTOLIN HFA) 90 mcg/actuation inhaler Inhale 2 puffs into the lungs every 6 (six) hours as needed for Wheezing. Rescue  Patient taking differently: Inhale 2 puffs into the lungs daily as needed for Wheezing or Shortness of Breath. Rescue 12/14/23 12/13/24 Yes Gordy Cordero MD   ALPRAZolam (XANAX) 0.5 MG tablet Take 1 tablet (0.5 mg total) by mouth 2 (two) times daily as needed for Anxiety.  Patient taking differently: Take 0.5 mg by mouth daily as needed for Anxiety. 2/8/24  Yes Gordy Cordero MD   aspirin (ECOTRIN) 81 MG EC tablet Take 81 mg by mouth once daily.   Yes Provider, Historical   atorvastatin (LIPITOR) 80 MG tablet Take 1 tablet (80 mg total) by mouth once daily.  Patient taking differently: Take 80 mg by mouth every evening. 2/7/24 2/6/25 Yes Gordy Cordero MD   b complex vitamins capsule Take 1 capsule by mouth every other day.   Yes Provider, Historical   colchicine (COLCRYS) 0.6 mg tablet For gout attack: Take TWO tablets by mouth, then, 2 hours later, take ONE additional tablet. Then take 1 tablet twice daily only if still needed for gout pain.  Patient taking differently: Take 0.6 mg by mouth daily as needed (For gout attack: Take TWO tablets by mouth, then, 2 hours later, take ONE additional tablet. Then take 1 tablet twice daily only if still needed for gout pain.). 12/13/23  Yes Gordy Cordero MD   DULoxetine (CYMBALTA) 60  MG capsule Take 2 capsules (120 mg total) by mouth once daily. 11/3/23  Yes Gordy Cordero MD   lisinopriL (PRINIVIL,ZESTRIL) 40 MG tablet Take 1 tablet (40 mg total) by mouth once daily. 6/27/24 6/27/25 Yes Gordy Cordero MD   metoprolol succinate (TOPROL-XL) 100 MG 24 hr tablet Take 1 tablet (100 mg total) by mouth 2 (two) times daily. 1/17/24  Yes Marah Hunter NP   multivitamin (THERAGRAN) per tablet Take 1 tablet by mouth once daily.   Yes Provider, Historical   NIFEdipine (PROCARDIA-XL) 60 MG (OSM) 24 hr tablet Take 1 tablet (60 mg total) by mouth once daily. 7/25/24 2/20/25 Yes Gordy Cordero MD   omeprazole (PRILOSEC) 20 MG capsule TAKE 1 CAPSULE BY MOUTH ONCE DAILY  Patient taking differently: Take 20 mg by mouth daily as needed (hearturn/ indegestion). 7/25/24  Yes Gordy Cordero MD   medroxyPROGESTERone (PROVERA) 10 MG tablet Take 2 tablets (20 mg total) by mouth 3 (three) times daily. 7/13/22 8/9/22  Yolanda Barriga MD   pantoprazole (PROTONIX) 40 MG tablet Take 1 tablet (40 mg total) by mouth once daily.  Patient not taking: Reported on 2/18/2022 1/25/22 2/27/22  Geovanna Garcia MD     Scheduled Meds:    DULoxetine  60 mg Oral Daily    heparin (porcine)  7,500 Units Subcutaneous Q8H    melatonin  3 mg Oral QHS    metoprolol succinate  100 mg Oral BID    pantoprazole  40 mg Oral Daily     Continuous Infusions:   PRN Meds:  Current Facility-Administered Medications:     acetaminophen, 650 mg, Oral, Q4H PRN    albuterol-ipratropium, 3 mL, Nebulization, Q4H PRN    ALPRAZolam, 0.25 mg, Oral, Daily PRN    aluminum-magnesium hydroxide-simethicone, 30 mL, Oral, QID PRN    dextrose 10%, 12.5 g, Intravenous, PRN    dextrose 10%, 25 g, Intravenous, PRN    diclofenac sodium, 2 g, Topical (Top), TID PRN    glucagon (human recombinant), 1 mg, Intramuscular, PRN    glucose, 16 g, Oral, PRN    glucose, 24 g, Oral, PRN    naloxone, 0.02 mg, Intravenous, PRN    ondansetron, 8 mg, Oral, Q8H  PRN    prochlorperazine, 5 mg, Intravenous, Q6H PRN    simethicone, 1 tablet, Oral, QID PRN    sodium chloride 0.9%, 10 mL, Intravenous, Q12H PRN    sodium chloride 0.9%, 10 mL, Intravenous, PRN  Anticoagulants/Antiplatelets: Heparin    Allergies:   Review of patient's allergies indicates:   Allergen Reactions    Flecainide Shortness Of Breath and Swelling    Shellfish containing products Other (See Comments)     Makes gout terrible    Vancomycin analogues Hives, Itching and Rash     Sedation Hx: have not been any systemic reactions    Vitals:  Temp: 97.8 °F (36.6 °C) (08/06/24 1120)  Pulse: 69 (08/06/24 1120)  Resp: (!) 26 (08/06/24 1120)  BP: 112/64 (08/06/24 1120)  SpO2: 99 % (08/06/24 1120)     Physical Exam:  ASA: 2  Mallampati: n/a    General: no acute distress  Mental Status: alert and oriented to person, place and time  HEENT: normocephalic, atraumatic  Chest: unlabored breathing  Heart: regular heart rate  Abdomen: distended  Extremity: moves all extremities    Plan: US guided paracentesis  Sedation Plan: local    Shannen Callahan PA-C  Interventional Radiology  [phone]

## 2024-08-06 NOTE — PROGRESS NOTES
"                    Medical Nutrition Therapy      Reason for Assessment: LOS (Day 10)  Dx: HF  Medical Hx: CHF, DM    General Info: Pt tolerating diet, consuming breakfast during visit. Reports good appetite PTA & intentional weight loss of 30 pounds w/ Mounjaro. Pt appears nourished w/ no indicators of malnutrition.    Current Diet: 1500 kcal ADA  % intake of meals: 75%    Ht: 5'5"  Wt: 101kg  BMI: 37.1    Labs: Reviewed - A1C 4.9  Meds: Reviewed    Overall Physical Appearance: Well Nourished    Level of Risk: Low    Nutrition Dx: No nutrition diagnosis at this time.    RD follow-up? Yes    Natacha MS, RD, LDN     "

## 2024-08-06 NOTE — CONSULTS
Epinastine is a generic antihistamine drop. One drop in each eye 2 times daily for 2 weeks, then reduce to 1-2 times daily to control itching, redness and mucus    Over-the-counter alternative is Zaditor   [facility] - Stepdown Flex ([place] [id number])    Wound Care     Patient Name:  Vicky Alvarado  MRN:  [id number]  Date: 2024  Diagnosis: Acute on chronic diastolic heart failure     History:  Past Medical History:   Diagnosis Date    Anticoagulant long-term use     Arthritis     Atrial fibrillation     cardioversion    Atrial fibrillation with RVR     HAS WATCHMAN IN PLACE NOW    Avascular necrosis     L hand    CHF (congestive heart failure)     Chronic fatigue     Depression     Diabetes mellitus     Encounter for blood transfusion 2020    Encounter for blood transfusion 2022    Fatty liver     GERD (gastroesophageal reflux disease)     Hx of psychiatric care     Hypertension     Iron deficiency anemia 2022    TIBC 444 with saturated iron 8    Liver disease     Obese     Pre-diabetes 2022    Psychiatric problem     Sleep apnea     wears cpap    SOB (shortness of breath) on exertion     Weight loss     75lb intentional weight loss     Social History     Socioeconomic History    Marital status:    Tobacco Use    Smoking status: Former     Current packs/day: 0.00     Types: Cigarettes     Quit date: 2019     Years since quittin.1    Smokeless tobacco: Never   Substance and Sexual Activity    Alcohol use: No    Drug use: No    Sexual activity: Not Currently     Partners: Male     Birth control/protection: See Surgical Hx     Social Determinants of Health     Financial Resource Strain: Low Risk  (2024)    Overall Financial Resource Strain (CARDIA)     Difficulty of Paying Living Expenses: Not hard at all   Food Insecurity: No Food Insecurity (2024)    Hunger Vital Sign     Worried About Running Out of Food in the Last Year: Never true     Ran Out of Food in the Last Year: Never true   Transportation Needs: No Transportation Needs (2024)    TRANSPORTATION NEEDS     Transportation : No   Physical Activity: Inactive (2024)    Exercise Vital Sign     Days of  Exercise per Week: 0 days     Minutes of Exercise per Session: 0 min   Stress: No Stress Concern Present (7/28/2024)    [employer] [place] [employer] - Occupational Stress Questionnaire     Feeling of Stress : Not at all   Housing Stability: Low Risk  (7/28/2024)    Housing Stability Vital Sign     Unable to Pay for Housing in the Last Year: No     Homeless in the Last Year: No     Precautions:  Allergies as of 07/26/2024 - Reviewed 07/26/2024   Allergen Reaction Noted    Flecainide Shortness Of Breath and Swelling 07/26/2021    Shellfish containing products Other (See Comments) 06/12/2023    Vancomycin analogues Hives, Itching, and Rash 05/07/2022     [facility] Assessment Details / Treatment:    Patient seen for wound care: New Consult   Chart reviewed for this encounter.   Labs:   WBC (K/uL)   Date Value   08/06/2024 8.63   08/05/2024 8.55     Glucose (mg/dL)   Date Value   08/06/2024 85   08/05/2024 81     Albumin (g/dL)   Date Value   08/06/2024 2.2 (L)   08/05/2024 2.1 (L)     Kodak Score: 15    Wound is POA    Narrative:  Pt seen for WC consultation / follow-up and agreed to assessment  Chart reviewed for this encounter.   See Flow Sheet for additional documentation and media.    Pt laying in bed, waffle in place not inflated. Pt able to turn with minimal assistance for assessment, PureWick removed as it obstructed the posterior labia area. Cleansed area with normal saline, gently patted dry.  Once PureWick was removed, moist intact blister noted to right posterior labia, perineal area pink/red erythema noted.   Right buttock pink with blanchable erythema   Left buttock appears to have two healing areas with dry skin to wound edges. Triad applied, waffle inflated, hand check performed to ensure pt was immersed properly. Fitted sheet replace to only cover waffle.   While pt was turned, bilateral heels assessed, pink intact, dry, blanchable.    Assure Q2H turn protocol continues to be implemented.      "recommended to pt to not use PureWick and she agreed, stated she will use the bedpan.    RECOMMENDATIONS:  Bedside nurse assess for acute changes (purulence, increased redness/swelling, increased drainage, malodor, increased pain, pallor, necrosis) please contact physician on any acute changes.    --- Do not use PureWick     ---Consult - [facility] NATALI, Angela Valero     ---Fitted sheet only on waffle -- not over waffle and mattress    ---Apply Triad correctly:      Always cleanse wound before applying Triad.  To remove Triad:   Use pH-balanced wound cleanser to soften Triad  Gently wipe without scrubbing  For complete removal, repeat as needed.     Gently spread Triad evenly over the area of application to the thickness of a dime.    ---"Immerse"  Ensure bed settings are correct:     Generally each green bar = 100lbs, this pt weight 222lbs, therefore 2 green bars is appropriate for patient immersion.   Please use nursing judgement to ensure pt is immersed.     Continue implementing Q2H turn protocol    Discussed POC with patient and primary nurse.   See EMR for orders & patient education.     Discussed nutrition and the role of protein in wound healing with the patient. Instructed patient to optimize protein for wound healing.     Bedside nursing to continue care & monitoring.  Bedside nursing to maintain pressure injury prevention interventions    Thank you for the consult. Wound Care will continue to follow.     08/06/24 0930   WOCN Assessment   WOCN Total Time (mins) 30   Visit Date 08/06/24   Visit Time 0930   Consult Type New   WOCN Speciality Wound   Wound moisture;At risk for pressure Injury;other   Intervention chart review;assessed;changed;applied;consult other service;orders   Teaching on-going        Wound 08/02/24 0700 Moisture associated dermatitis Perineum   Date First Assessed/Time First Assessed: 08/02/24 0700   Present on Original Admission: Yes  Primary Wound Type: Moisture associated " dermatitis  Location: Perineum   Wound Image    Dressing Appearance Open to air   Drainage Amount None   Drainage Characteristics/Odor No odor   Appearance Pink;Moist;Blistered   Tissue loss description Not applicable   Red (%), Wound Tissue Color 100 %   Periwound Area Intact;[place]   Wound Edges Irregular   Care Cleansed with:;Sterile normal saline   Dressing Applied;Other (comment)  (Triad)   Off Loading Other (see comments)  (Immerse ordered)   Dressing Change Due 08/06/24

## 2024-08-06 NOTE — SUBJECTIVE & OBJECTIVE
Interval History: No acute overnight events. Pt scheduled for right heart cath this AM.    Review of Systems   Constitutional: Positive for weight gain. Negative for chills and fever.   Cardiovascular:  Positive for chest pain, dyspnea on exertion, leg swelling, orthopnea and palpitations.   Respiratory:  Positive for shortness of breath. Negative for cough.    Gastrointestinal:  Positive for nausea and vomiting.   Neurological:  Negative for dizziness and headaches.     Objective:     Vital Signs (Most Recent):  Temp: 97.4 °F (36.3 °C) (08/05/24 1621)  Pulse: 62 (08/05/24 1621)  Resp: (!) 27 (08/05/24 1621)  BP: 111/70 (08/05/24 1621)  SpO2: 95 % (08/05/24 1304) Vital Signs (24h Range):  Temp:  [97 °F (36.1 °C)-97.7 °F (36.5 °C)] 97.4 °F (36.3 °C)  Pulse:  [57-79] 62  Resp:  [15-27] 27  SpO2:  [92 %-100 %] 95 %  BP: (103-142)/(62-86) 111/70     Weight: 101 kg (222 lb 10.6 oz)  Body mass index is 37.05 kg/m².     SpO2: 95 %         Intake/Output Summary (Last 24 hours) at 8/5/2024 2015  Last data filed at 8/5/2024 1042  Gross per 24 hour   Intake 400 ml   Output 2 ml   Net 398 ml       Lines/Drains/Airways       Drain  Duration             Female External Urinary Catheter w/ Suction 07/27/24 0035 9 days              Peripheral Intravenous Line  Duration                  Peripheral IV - Single Lumen 08/01/24 1053 20 G Distal;Left;Posterior Forearm 4 days         Peripheral IV - Single Lumen 08/01/24 1105 20 G 1 3/4 in Anterior;Left Upper Arm 4 days                       Physical Exam  Constitutional:       General: She is not in acute distress.     Appearance: She is obese.   HENT:      Head: Normocephalic and atraumatic.      Mouth/Throat:      Pharynx: Oropharynx is clear.   Eyes:      Extraocular Movements: Extraocular movements intact.      Pupils: Pupils are equal, round, and reactive to light.   Cardiovascular:      Rate and Rhythm: Normal rate and regular rhythm.      Pulses: Normal pulses.      Heart sounds:  Murmur heard.      Gallop present. S4 sounds present.   Pulmonary:      Effort: Pulmonary effort is normal.      Breath sounds: Normal breath sounds.   Abdominal:      General: There is distension.      Palpations: Abdomen is soft.      Tenderness: There is no abdominal tenderness.   Musculoskeletal:         General: Normal range of motion.      Cervical back: Neck supple.      Right lower leg: Edema present.      Left lower leg: Edema present.   Skin:     General: Skin is warm and dry.      Capillary Refill: Capillary refill takes less than 2 seconds.   Neurological:      General: No focal deficit present.      Mental Status: She is alert and oriented to person, place, and time.   Psychiatric:         Mood and Affect: Mood normal.         Behavior: Behavior normal.            Significant Labs: CMP   Recent Labs   Lab 08/04/24  0550 08/05/24  0504    136   K 4.2 4.2   CL 90* 89*   CO2 34* 35*   GLU 83 81   BUN 27* 34*   CREATININE 1.2 1.5*   CALCIUM 9.4 9.4   PROT 6.4 6.2   ALBUMIN 2.1* 2.1*   BILITOT 1.2* 0.9   ALKPHOS 181* 185*   AST 31 30   ALT 6* 7*   ANIONGAP 12 12       Significant Imaging: Echocardiogram: Transthoracic echo (TTE) complete ([facility] Only):   Results for orders placed or performed during the hospital encounter of 07/26/24   Echo   Result Value Ref Range    BSA 2.31 m2    LVOT stroke volume 78.59 cm3    LVIDd 5.23 3.5 - 6.0 cm    LV Systolic Volume 49.86 mL    LV Systolic Volume Index 22.7 mL/m2    LVIDs 3.47 2.1 - 4.0 cm    LV Diastolic Volume 131.05 mL    LV Diastolic Volume Index 59.57 mL/m2    IVS 0.76 0.6 - 1.1 cm    LVOT diameter 2.0 cm    LVOT area 3.1 cm2    FS 34 28 - 44 %    Left Ventricle Relative Wall Thickness 0.31 cm    Posterior Wall 0.81 0.6 - 1.1 cm    LV mass 143.19 g    LV Mass Index 65 g/m2    MV Peak E Eulalio 1.06 m/s    TDI LATERAL 0.14 m/s    TDI SEPTAL 0.07 m/s    E/E' ratio 10.10 m/s    TR Max Eulalio 3.58 m/s    IVRT 85.63 msec    E wave deceleration time 217.62 msec    LV  SEPTAL E/E' RATIO 15.14 m/s    LA Volume Index 52.9 mL/m2    LV LATERAL E/E' RATIO 7.57 m/s    LA volume 116.34 cm3    LVOT peak carlee 1.10 m/s    RV- trejo basal diam 5.2 cm    TAPSE 3.10 cm    LA size 4.93 cm    Left Atrium Minor Axis 6.11 cm    Left Atrium Major Axis 6.12 cm    LA volume (mod) 92.76 cm3    LA WIDTH 4.54 cm    LA Volume Index (Mod) 42.2 mL/m2    RA Major Axis 5.2 cm    RA Width 4.3 cm    AV mean gradient 13 mmHg    AV peak gradient 23 mmHg    Ao peak carlee 2.39 m/s    Ao VTI 48.12 cm    LVOT peak VTI 25.03 cm    AV valve area 1.63 cm²    AV Velocity Ratio 0.46     AV index (prosthetic) 0.52     LENCHO by Velocity Ratio 1.45 cm²    MV stenosis pressure 1/2 time 63.11 ms    MV valve area p 1/2 method 3.49 cm2    Triscuspid Valve Regurgitation Peak Gradient 51 mmHg    Sinus 2.44 cm    STJ 2.26 cm    Ascending aorta 2.92 cm    Mean e' 0.11 m/s    ZLVIDS -2.17     ZLVIDD -3.62     TV resting pulmonary artery pressure 54 mmHg    RV TB RVSP 7 mmHg    Est. RA pres 3 mmHg    Narrative      Left Ventricle: The left ventricle is normal in size. Normal wall   thickness. Normal wall motion. There is normal systolic function with a   visually estimated ejection fraction of 60 - 65%. There is indeterminate   diastolic function.    Right Ventricle: Moderate right ventricular enlargement. Systolic   function is borderline low.    Left Atrium: Left atrium is severely dilated.    Right Atrium: Right atrium is dilated. Catheter present in the right   atrium.    Aortic Valve: The aortic valve is a trileaflet valve. There is mild   aortic valve sclerosis. There is mild annular calcification present. There   is mild stenosis. Aortic valve area by VTI is 1.63 cm². Aortic valve peak   velocity is 2.39 m/s. Mean gradient is 13 mmHg. The dimensionless index is   0.52.    Pulmonary Artery: There is moderate pulmonary hypertension. The   estimated pulmonary artery systolic pressure is 54 mmHg.    IVC/SVC: Normal venous pressure at 3  mmHg.    Ascites present.

## 2024-08-06 NOTE — ASSESSMENT & PLAN NOTE
Concerned anasarca may represent sequelae of cirrhosis. EGD 8/2021 with Portal hypertensive gastropathy. No varices. No abd pain, fever, chills, or confusion to raise concern for SBP or HE respectively    S/p liver biopsy, pending path.   S/p x3 para 8/6    - Favor albumin if hypotensive  - LVP   -Initial SBP Gram stain negative for WBC making SBP less likely  -studies on ascites fluid  leaned slightly toward cardiac origin but some results pointed more towards the liver;   - Pending MRI A/P for further evaluation     Right heart cath performed by cardiology; Results: Right heart cath found: normal biventricular filling pressures, borderline low CI/CO, and mild pre-capillary pulmonary HTN.

## 2024-08-06 NOTE — SUBJECTIVE & OBJECTIVE
Interval History: NAEON. S/P liver Bx with pressure measurements and RHC. Liver Bx pressure measurements without portal HTN and RHC with normal BV pressures.     Current Facility-Administered Medications   Medication    acetaminophen tablet 650 mg    albuterol-ipratropium 2.5 mg-0.5 mg/3 mL nebulizer solution 3 mL    ALPRAZolam tablet 0.25 mg    aluminum-magnesium hydroxide-simethicone 200-200-20 mg/5 mL suspension 30 mL    dextrose 10% bolus 125 mL 125 mL    dextrose 10% bolus 250 mL 250 mL    diclofenac sodium 1 % gel 2 g    DULoxetine DR capsule 60 mg    glucagon (human recombinant) injection 1 mg    glucose chewable tablet 16 g    glucose chewable tablet 24 g    heparin (porcine) injection 7,500 Units    melatonin tablet 3 mg    metoprolol succinate (TOPROL-XL) 24 hr tablet 100 mg    naloxone 0.4 mg/mL injection 0.02 mg    ondansetron disintegrating tablet 8 mg    pantoprazole EC tablet 40 mg    prochlorperazine injection Soln 5 mg    simethicone chewable tablet 80 mg    sodium chloride 0.9% bolus 250 mL 250 mL    sodium chloride 0.9% flush 10 mL    sodium chloride 0.9% flush 10 mL     Facility-Administered Medications Ordered in Other Encounters   Medication    mupirocin 2 % ointment       Objective:     Vital Signs (Most Recent):  Temp: 98.6 °F (37 °C) (08/06/24 0731)  Pulse: 65 (08/06/24 0731)  Resp: 16 (08/06/24 0731)  BP: (!) 123/59 (08/06/24 0731)  SpO2: 100 % (08/06/24 0731) Vital Signs (24h Range):  Temp:  [97 °F (36.1 °C)-98.6 °F (37 °C)] 98.6 °F (37 °C)  Pulse:  [57-81] 65  Resp:  [15-27] 16  SpO2:  [80 %-100 %] 100 %  BP: (103-142)/(59-86) 123/59     Weight: 101 kg (222 lb 10.6 oz) (08/04/24 0400)  Body mass index is 37.05 kg/m².       Physical Exam  Constitutional:       Appearance: She is obese.   HENT:      Head: Normocephalic and atraumatic.      Mouth/Throat:      Mouth: Mucous membranes are moist.   Eyes:      Extraocular Movements: Extraocular movements intact.      Conjunctiva/sclera:  Conjunctivae normal.      Pupils: Pupils are equal, round, and reactive to light.   Cardiovascular:      Rate and Rhythm: Normal rate and regular rhythm.      Heart sounds: Normal heart sounds.   Pulmonary:      Effort: Pulmonary effort is normal. No respiratory distress.      Breath sounds: Normal breath sounds. No wheezing or rhonchi.   Abdominal:      General: There is distension.      Tenderness: There is no abdominal tenderness.   Musculoskeletal:         General: Swelling present. Normal range of motion.      Cervical back: Normal range of motion and neck supple.      Right lower leg: Edema present.      Left lower leg: Edema present.   Skin:     General: Skin is warm and dry.   Neurological:      Mental Status: She is alert and oriented to person, place, and time.   Psychiatric:         Mood and Affect: Mood normal.            MELD 3.0: 12 at 7/29/2024  2:27 PM  MELD-Na: 9 at 7/29/2024  2:27 PM  Calculated from:  Serum Creatinine: 1.2 mg/dL at 7/29/2024  2:27 PM  Serum Sodium: 141 mmol/L (Using max of 137 mmol/L) at 7/29/2024  2:27 PM  Total Bilirubin: 0.6 mg/dL (Using min of 1 mg/dL) at 7/28/2024  5:10 AM  Serum Albumin: 2.5 g/dL at 7/28/2024  4:08 PM  INR(ratio): 1.1 at 7/27/2024  4:35 AM  Age at listing (hypothetical): 59 years  Sex: Female at 7/29/2024  2:27 PM      Significant Labs:  CBC:   Recent Labs   Lab 08/06/24  0630   WBC 8.63   RBC 3.47*   HGB 9.7*   HCT 31.1*        CMP:   Recent Labs   Lab 08/06/24  0630   GLU 85   CALCIUM 9.5   ALBUMIN 2.2*   PROT 6.5   *   K 4.2   CO2 34*   CL 89*   BUN 35*   CREATININE 1.3   ALKPHOS 178*   ALT 6*   AST 27   BILITOT 0.8       Significant Imaging:  Labs: Reviewed

## 2024-08-06 NOTE — ASSESSMENT & PLAN NOTE
Seen by Dr. Saba with hepatology for previous chronic mild elevation in LFT, determined most likely NADLF. Now with cirrhotic morphology on CT. Previous EGD with portal gastropathy. LFT normal on admit. 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     - spironolactone 25mg and Lasix 80 BID holding diuretics due to dip in kidney function  -IR consulted; performed liver biopsy; awaiting results.

## 2024-08-06 NOTE — ASSESSMENT & PLAN NOTE
Cardiorenal syndrome  Acute HF exacerbation of uncertain etiology (no missed doses of lasix). Last TTE with EF 60% and G2DD. Concern that she may have new R-sided HF given anasarca (large volume ascites) versus now concomitant MASH cirrhosis compounding volume overloaded state and therefore needs higher diuresis at baseline. Suspect JHONATAN d.t overload - CRS.     Total 17L removed via para + 8L UOP  TTE with intermediate diastolic dysfunction with EF 60-65%, minor aortic stenosis, and moderate pulmonary hypertension with estimated pulmonary artery systolic pressure is 54 mmHg.    - Intravascularly depleted, holding lasix 80mg IV and spironolactone 50mg  - RFP BID  - BIPAP qhs and during naps     - Fluid restriction at 1200 cc with strict I/Os and daily weights   - Check Electrolytes, keep Mag >2 & K+ >4  - Ambulate as tolerated

## 2024-08-06 NOTE — PROCEDURES
Radiology Post-Procedure Note    Pre Op Diagnosis: Ascites  Post Op Diagnosis: Same    Procedure: Ultrasound Guided Paracentesis    Procedure performed by: Shannen Callahan PA-C    Written Informed Consent Obtained: Yes  Specimen Removed: YES (yellow, clear)  Estimated Blood Loss: Minimal    Findings:   Successful paracentesis from RLQ.  Albumin administered PRN per protocol.    Patient tolerated procedure well.    Shannen Callahan PA-C  Interventional Radiology  [phone]

## 2024-08-06 NOTE — ASSESSMENT & PLAN NOTE
Imaging findings suggestive of cirrhosis  Charted Hx of PHG    Volume overload of unclear etiology. Noted to have imaging findings of cirrhosis and presenting with ascites. Synthetic function appears stable. No pathological evidence of cirrhosis at this time. Paracentesis studies obtained on presentation showed:    SAAG  1.0 (does not support liver etiology) with total ascitic protein of 3.2 (does not support liver etiology).    Repeat paracentesis with removal of 10L on 7/30 showed:    SAAG of 1.5 (supports liver etiology) with total ascitic protein of 2.9 (does not support liver etiology)    Cytology pending. Fluid studies do not support hepatic etiology of ascites. Liver Bx is pending. S/P RHC and cardiology does not believe this is of cardiac etiology. Wedge pressures noted on liver Bx pressure measurements did not show significant portal HTN.     Recommendations:    - Follow up pending paracentesis cytology  - Follow up pending liver Bx pathology  - Obtain MRI with contrast of abdomen and pelvis (malignancy consideration)

## 2024-08-06 NOTE — PT/OT/SLP PROGRESS
Physical Therapy      Patient Name:  Vicky Alvarado   MRN:  [id number]    Patient not seen today secondary to Off the floor for procedure/surgery (paracentesis). Will follow-up tomorrow.    Rogelio Flores, PT  8/6/2024

## 2024-08-06 NOTE — PROGRESS NOTES
[facility] ([place] [id number])  Cardiology  Progress Note    Patient Name: Vicky Alvarado  MRN: [id number]  Admission Date: 7/26/2024  Hospital Length of Stay: 9 days  Code Status: Full Code   Attending Physician: Cory Hairston, *   Primary Care Physician: Gordy Cordero MD  Expected Discharge Date: 8/8/2024  Principal Problem:Acute on chronic diastolic heart failure    Subjective:     Hospital Course:   No notes on file    Interval History: No acute overnight events. Pt scheduled for right heart cath this AM.    Review of Systems   Constitutional: Positive for weight gain. Negative for chills and fever.   Cardiovascular:  Positive for chest pain, dyspnea on exertion, leg swelling, orthopnea and palpitations.   Respiratory:  Positive for shortness of breath. Negative for cough.    Gastrointestinal:  Positive for nausea and vomiting.   Neurological:  Negative for dizziness and headaches.     Objective:     Vital Signs (Most Recent):  Temp: 97.4 °F (36.3 °C) (08/05/24 1621)  Pulse: 62 (08/05/24 1621)  Resp: (!) 27 (08/05/24 1621)  BP: 111/70 (08/05/24 1621)  SpO2: 95 % (08/05/24 1304) Vital Signs (24h Range):  Temp:  [97 °F (36.1 °C)-97.7 °F (36.5 °C)] 97.4 °F (36.3 °C)  Pulse:  [57-79] 62  Resp:  [15-27] 27  SpO2:  [92 %-100 %] 95 %  BP: (103-142)/(62-86) 111/70     Weight: 101 kg (222 lb 10.6 oz)  Body mass index is 37.05 kg/m².     SpO2: 95 %         Intake/Output Summary (Last 24 hours) at 8/5/2024 2015  Last data filed at 8/5/2024 1042  Gross per 24 hour   Intake 400 ml   Output 2 ml   Net 398 ml       Lines/Drains/Airways       Drain  Duration             Female External Urinary Catheter w/ Suction 07/27/24 0035 9 days              Peripheral Intravenous Line  Duration                  Peripheral IV - Single Lumen 08/01/24 1053 20 G Distal;Left;Posterior Forearm 4 days         Peripheral IV - Single Lumen 08/01/24 1105 20 G 1 3/4 in Anterior;Left Upper Arm 4 days                        Physical Exam  Constitutional:       General: She is not in acute distress.     Appearance: She is obese.   HENT:      Head: Normocephalic and atraumatic.      Mouth/Throat:      Pharynx: Oropharynx is clear.   Eyes:      Extraocular Movements: Extraocular movements intact.      Pupils: Pupils are equal, round, and reactive to light.   Cardiovascular:      Rate and Rhythm: Normal rate and regular rhythm.      Pulses: Normal pulses.      Heart sounds: Murmur heard.      Gallop present. S4 sounds present.   Pulmonary:      Effort: Pulmonary effort is normal.      Breath sounds: Normal breath sounds.   Abdominal:      General: There is distension.      Palpations: Abdomen is soft.      Tenderness: There is no abdominal tenderness.   Musculoskeletal:         General: Normal range of motion.      Cervical back: Neck supple.      Right lower leg: Edema present.      Left lower leg: Edema present.   Skin:     General: Skin is warm and dry.      Capillary Refill: Capillary refill takes less than 2 seconds.   Neurological:      General: No focal deficit present.      Mental Status: She is alert and oriented to person, place, and time.   Psychiatric:         Mood and Affect: Mood normal.         Behavior: Behavior normal.            Significant Labs: CMP   Recent Labs   Lab 08/04/24  0550 08/05/24  0504    136   K 4.2 4.2   CL 90* 89*   CO2 34* 35*   GLU 83 81   BUN 27* 34*   CREATININE 1.2 1.5*   CALCIUM 9.4 9.4   PROT 6.4 6.2   ALBUMIN 2.1* 2.1*   BILITOT 1.2* 0.9   ALKPHOS 181* 185*   AST 31 30   ALT 6* 7*   ANIONGAP 12 12       Significant Imaging: Echocardiogram: Transthoracic echo (TTE) complete ([facility] Only):   Results for orders placed or performed during the hospital encounter of 07/26/24   Echo   Result Value Ref Range    BSA 2.31 m2    LVOT stroke volume 78.59 cm3    LVIDd 5.23 3.5 - 6.0 cm    LV Systolic Volume 49.86 mL    LV Systolic Volume Index 22.7 mL/m2    LVIDs 3.47 2.1 - 4.0 cm    LV Diastolic Volume  131.05 mL    LV Diastolic Volume Index 59.57 mL/m2    IVS 0.76 0.6 - 1.1 cm    LVOT diameter 2.0 cm    LVOT area 3.1 cm2    FS 34 28 - 44 %    Left Ventricle Relative Wall Thickness 0.31 cm    Posterior Wall 0.81 0.6 - 1.1 cm    LV mass 143.19 g    LV Mass Index 65 g/m2    MV Peak E Eulalio 1.06 m/s    TDI LATERAL 0.14 m/s    TDI SEPTAL 0.07 m/s    E/E' ratio 10.10 m/s    TR Max Eulalio 3.58 m/s    IVRT 85.63 msec    E wave deceleration time 217.62 msec    LV SEPTAL E/E' RATIO 15.14 m/s    LA Volume Index 52.9 mL/m2    LV LATERAL E/E' RATIO 7.57 m/s    LA volume 116.34 cm3    LVOT peak eulalio 1.10 m/s    RV- trejo basal diam 5.2 cm    TAPSE 3.10 cm    LA size 4.93 cm    Left Atrium Minor Axis 6.11 cm    Left Atrium Major Axis 6.12 cm    LA volume (mod) 92.76 cm3    LA WIDTH 4.54 cm    LA Volume Index (Mod) 42.2 mL/m2    RA Major Axis 5.2 cm    RA Width 4.3 cm    AV mean gradient 13 mmHg    AV peak gradient 23 mmHg    Ao peak eulalio 2.39 m/s    Ao VTI 48.12 cm    LVOT peak VTI 25.03 cm    AV valve area 1.63 cm²    AV Velocity Ratio 0.46     AV index (prosthetic) 0.52     LENCHO by Velocity Ratio 1.45 cm²    MV stenosis pressure 1/2 time 63.11 ms    MV valve area p 1/2 method 3.49 cm2    Triscuspid Valve Regurgitation Peak Gradient 51 mmHg    Sinus 2.44 cm    STJ 2.26 cm    Ascending aorta 2.92 cm    Mean e' 0.11 m/s    ZLVIDS -2.17     ZLVIDD -3.62     TV resting pulmonary artery pressure 54 mmHg    RV TB RVSP 7 mmHg    Est. RA pres 3 mmHg    Narrative      Left Ventricle: The left ventricle is normal in size. Normal wall   thickness. Normal wall motion. There is normal systolic function with a   visually estimated ejection fraction of 60 - 65%. There is indeterminate   diastolic function.    Right Ventricle: Moderate right ventricular enlargement. Systolic   function is borderline low.    Left Atrium: Left atrium is severely dilated.    Right Atrium: Right atrium is dilated. Catheter present in the right   atrium.    Aortic Valve:  The aortic valve is a trileaflet valve. There is mild   aortic valve sclerosis. There is mild annular calcification present. There   is mild stenosis. Aortic valve area by VTI is 1.63 cm². Aortic valve peak   velocity is 2.39 m/s. Mean gradient is 13 mmHg. The dimensionless index is   0.52.    Pulmonary Artery: There is moderate pulmonary hypertension. The   estimated pulmonary artery systolic pressure is 54 mmHg.    IVC/SVC: Normal venous pressure at 3 mmHg.    Ascites present.       Assessment and Plan:       Volume overload  Liver US with doppler ordered with evidence of cirrhotic liver morphology with portal HTN, splenomegaly and moderate volume ascites. Last seen in hepatology clinic by Dr. Gutierres in 4/2023 with plans for TJ liver biopsy with pressure measurements, but seems patient was lost to follow up. LFTs and ALP wnl. Tbili today slightly elevated at 1.2 with direct bili 0.8.     Ascitic fluid studies with initial SAAG was 1.0 with total ascitic protein of 3.2. Repeat paracentesis with removal of 10L on 7/30 had SAAG of 1.5 with total ascitic protein of 2.9. Consistent with exudative fluid, as protein >2.5 g. If overload in setting of heart failure, would expect ascitic fluid to be transudative.    , troponin <0.006. EKG shows RBBB. Has been receiving IV furosemide 80 mg BID. Accuracy of UOP documentation questionable as pt has been reporting frequent urination, which do not match recorded I/O's. Pt did state to feel as if she was in afib all day on 8/1. She is currently compliant on metoprolol succinate 100 mg and nifedipine 60 mg for rate control at home. Also s/p watchman placement in 2023. 7/28 TTE with EF 60-65%, moderate RV enlargement, LA severe dilation, CVP 3 mmHg, moderate pulmonary hypertension with pulmonary artery systolic pressure 54 mmHg, and mild AS. S4 heart sound on exam, likely in setting of pulmonary HTN. Abdominal swelling also notably increased, though pt without c/o worsening  CP or SOB. Lungs CTAB. Given low CVP, not convinced volume overload is d/t heart failure. Right heart catheterization performed this AM. Results show low right heart pressures and low PA pressure, which often suggests liver disease. There is no evidence that volume overload is cardiac in etiology. Will sign off on pt.         VTE Risk Mitigation (From admission, onward)           Ordered     heparin (porcine) injection 7,500 Units  Every 8 hours         07/29/24 1202     IP VTE HIGH RISK PATIENT  Once         07/27/24 0351     Place sequential compression device  Until discontinued         07/27/24 0351                    Krystyna Livingston, DO  Cardiology  [facility] ([facility] [place])

## 2024-08-06 NOTE — SUBJECTIVE & OBJECTIVE
Interval History: Liver biopsy results pending.       Review of Systems   Constitutional:  Negative for chills and diaphoresis.   Respiratory:  Negative for cough and shortness of breath.    Cardiovascular:  Negative for chest pain, palpitations and leg swelling.   Gastrointestinal:  Positive for abdominal distention and diarrhea. Negative for abdominal pain, blood in stool, nausea and vomiting.   Genitourinary:  Negative for difficulty urinating, dysuria and hematuria.   Musculoskeletal:  Positive for arthralgias and joint swelling.        Complains of some discomfort in her feet. One toe on her right foot looks slightly erythematous around the joint   Neurological:  Negative for dizziness and headaches.     Objective:     Vital Signs (Most Recent):  Temp: 97.9 °F (36.6 °C) (08/06/24 1449)  Pulse: 76 (08/06/24 1449)  Resp: 16 (08/06/24 1449)  BP: (!) 99/55 (08/06/24 1449)  SpO2: 98 % (08/06/24 1449) Vital Signs (24h Range):  Temp:  [97.4 °F (36.3 °C)-98.6 °F (37 °C)] 97.9 °F (36.6 °C)  Pulse:  [58-81] 76  Resp:  [16-27] 16  SpO2:  [80 %-100 %] 98 %  BP: ()/(52-77) 99/55     Weight: 101 kg (222 lb 10.6 oz)  Body mass index is 37.05 kg/m².    Intake/Output Summary (Last 24 hours) at 8/6/2024 1455  Last data filed at 8/6/2024 1322  Gross per 24 hour   Intake --   Output 11206 ml   Net -24416 ml         Physical Exam  Constitutional:       General: She is not in acute distress.     Appearance: She is obese. She is not ill-appearing.   HENT:      Head: Normocephalic and atraumatic.   Eyes:      General: No scleral icterus.  Cardiovascular:      Rate and Rhythm: Normal rate and regular rhythm.   Pulmonary:      Effort: Pulmonary effort is normal.      Breath sounds: Normal breath sounds.      Comments: Patient on 2 L nasal canula  Abdominal:      General: Bowel sounds are normal. There is distension.      Tenderness: There is no abdominal tenderness. There is no guarding or rebound.   Musculoskeletal:      Right  lower leg: Edema present.      Left lower leg: Edema present.      Comments: Edema improved since admission. Slight pitting edema.  Patient not wearing compression stockings.    Skin:     General: Skin is warm and dry.      Coloration: Skin is not jaundiced.   Neurological:      Mental Status: She is alert and oriented to person, place, and time.   Psychiatric:         Mood and Affect: Mood normal.         Behavior: Behavior normal.           Significant Labs: All pertinent labs within the past 24 hours have been reviewed.    Significant Imaging: I have reviewed all pertinent imaging results/findings within the past 24 hours.

## 2024-08-07 PROBLEM — L24.A2 IRRITANT CONTACT DERMATITIS DUE TO FECAL, URINARY OR DUAL INCONTINENCE: Status: ACTIVE | Noted: 2024-08-07

## 2024-08-07 LAB
ALBUMIN SERPL BCP-MCNC: 2.6 G/DL (ref 3.5–5.2)
ALP SERPL-CCNC: 153 U/L (ref 55–135)
ALT SERPL W/O P-5'-P-CCNC: 5 U/L (ref 10–44)
ANION GAP SERPL CALC-SCNC: 7 MMOL/L (ref 8–16)
AST SERPL-CCNC: 21 U/L (ref 10–40)
BASOPHILS # BLD AUTO: 0.05 K/UL (ref 0–0.2)
BASOPHILS NFR BLD: 0.8 % (ref 0–1.9)
BILIRUB SERPL-MCNC: 1 MG/DL (ref 0.1–1)
BUN SERPL-MCNC: 32 MG/DL (ref 6–20)
CALCIUM SERPL-MCNC: 9.3 MG/DL (ref 8.7–10.5)
CHLORIDE SERPL-SCNC: 92 MMOL/L (ref 95–110)
CO2 SERPL-SCNC: 37 MMOL/L (ref 23–29)
CREAT SERPL-MCNC: 1.3 MG/DL (ref 0.5–1.4)
DIFFERENTIAL METHOD BLD: ABNORMAL
EOSINOPHIL # BLD AUTO: 0.3 K/UL (ref 0–0.5)
EOSINOPHIL NFR BLD: 4.4 % (ref 0–8)
ERYTHROCYTE [DISTWIDTH] IN BLOOD BY AUTOMATED COUNT: 14.1 % (ref 11.5–14.5)
EST. GFR  (NO RACE VARIABLE): 47.1 ML/MIN/1.73 M^2
GLUCOSE SERPL-MCNC: 93 MG/DL (ref 70–110)
HCT VFR BLD AUTO: 33.1 % (ref 37–48.5)
HGB BLD-MCNC: 9.6 G/DL (ref 12–16)
IMM GRANULOCYTES # BLD AUTO: 0.06 K/UL (ref 0–0.04)
IMM GRANULOCYTES NFR BLD AUTO: 1 % (ref 0–0.5)
LYMPHOCYTES # BLD AUTO: 0.7 K/UL (ref 1–4.8)
LYMPHOCYTES NFR BLD: 11.5 % (ref 18–48)
MAGNESIUM SERPL-MCNC: 2 MG/DL (ref 1.6–2.6)
MCH RBC QN AUTO: 27.1 PG (ref 27–31)
MCHC RBC AUTO-ENTMCNC: 29 G/DL (ref 32–36)
MCV RBC AUTO: 94 FL (ref 82–98)
MONOCYTES # BLD AUTO: 0.8 K/UL (ref 0.3–1)
MONOCYTES NFR BLD: 12.7 % (ref 4–15)
NEUTROPHILS # BLD AUTO: 4.3 K/UL (ref 1.8–7.7)
NEUTROPHILS NFR BLD: 69.6 % (ref 38–73)
NRBC BLD-RTO: 0 /100 WBC
PHOSPHATE SERPL-MCNC: 3.1 MG/DL (ref 2.7–4.5)
PLATELET # BLD AUTO: 204 K/UL (ref 150–450)
PMV BLD AUTO: 10.4 FL (ref 9.2–12.9)
POCT GLUCOSE: 110 MG/DL (ref 70–110)
POCT GLUCOSE: 120 MG/DL (ref 70–110)
POCT GLUCOSE: 136 MG/DL (ref 70–110)
POCT GLUCOSE: 99 MG/DL (ref 70–110)
POTASSIUM SERPL-SCNC: 3.7 MMOL/L (ref 3.5–5.1)
PROT SERPL-MCNC: 6.1 G/DL (ref 6–8.4)
RBC # BLD AUTO: 3.54 M/UL (ref 4–5.4)
SODIUM SERPL-SCNC: 136 MMOL/L (ref 136–145)
WBC # BLD AUTO: 6.15 K/UL (ref 3.9–12.7)

## 2024-08-07 PROCEDURE — 97530 THERAPEUTIC ACTIVITIES: CPT

## 2024-08-07 PROCEDURE — 94761 N-INVAS EAR/PLS OXIMETRY MLT: CPT

## 2024-08-07 PROCEDURE — 99900035 HC TECH TIME PER 15 MIN (STAT)

## 2024-08-07 PROCEDURE — 85025 COMPLETE CBC W/AUTO DIFF WBC: CPT

## 2024-08-07 PROCEDURE — 25000003 PHARM REV CODE 250

## 2024-08-07 PROCEDURE — 83735 ASSAY OF MAGNESIUM: CPT

## 2024-08-07 PROCEDURE — 97116 GAIT TRAINING THERAPY: CPT

## 2024-08-07 PROCEDURE — 99222 1ST HOSP IP/OBS MODERATE 55: CPT | Mod: ,,, | Performed by: NURSE PRACTITIONER

## 2024-08-07 PROCEDURE — 99222 1ST HOSP IP/OBS MODERATE 55: CPT | Mod: ,,, | Performed by: INTERNAL MEDICINE

## 2024-08-07 PROCEDURE — 63600175 PHARM REV CODE 636 W HCPCS

## 2024-08-07 PROCEDURE — 97535 SELF CARE MNGMENT TRAINING: CPT

## 2024-08-07 PROCEDURE — 84100 ASSAY OF PHOSPHORUS: CPT

## 2024-08-07 PROCEDURE — 80053 COMPREHEN METABOLIC PANEL: CPT | Performed by: STUDENT IN AN ORGANIZED HEALTH CARE EDUCATION/TRAINING PROGRAM

## 2024-08-07 PROCEDURE — 27100171 HC OXYGEN HIGH FLOW UP TO 24 HOURS

## 2024-08-07 PROCEDURE — 20600001 HC STEP DOWN PRIVATE ROOM

## 2024-08-07 RX ADMIN — METOPROLOL SUCCINATE 100 MG: 100 TABLET, EXTENDED RELEASE ORAL at 09:08

## 2024-08-07 RX ADMIN — PANTOPRAZOLE SODIUM 40 MG: 40 TABLET, DELAYED RELEASE ORAL at 09:08

## 2024-08-07 RX ADMIN — DULOXETINE HYDROCHLORIDE 60 MG: 30 CAPSULE, DELAYED RELEASE ORAL at 09:08

## 2024-08-07 RX ADMIN — ACETAMINOPHEN 650 MG: 325 TABLET ORAL at 10:08

## 2024-08-07 RX ADMIN — HEPARIN SODIUM 7500 UNITS: 5000 INJECTION INTRAVENOUS; SUBCUTANEOUS at 09:08

## 2024-08-07 RX ADMIN — HEPARIN SODIUM 7500 UNITS: 5000 INJECTION INTRAVENOUS; SUBCUTANEOUS at 02:08

## 2024-08-07 RX ADMIN — ALPRAZOLAM 0.25 MG: 0.25 TABLET ORAL at 10:08

## 2024-08-07 RX ADMIN — PROCHLORPERAZINE EDISYLATE 5 MG: 5 INJECTION INTRAMUSCULAR; INTRAVENOUS at 07:08

## 2024-08-07 RX ADMIN — ACETAMINOPHEN 650 MG: 325 TABLET ORAL at 09:08

## 2024-08-07 RX ADMIN — HEPARIN SODIUM 7500 UNITS: 5000 INJECTION INTRAVENOUS; SUBCUTANEOUS at 06:08

## 2024-08-07 RX ADMIN — Medication 3 MG: at 09:08

## 2024-08-07 NOTE — SUBJECTIVE & OBJECTIVE
Interval History: Liver biopsy results returned and were inconclusive. Cirrhosis was seen but likely secondary to a congestive hepatopathy. Hepatology recommends further workup for potential causes. Cariology states a cardiac origin is also unlikely. Cytology fluid negative for malignant cells. MRI abdomen/pelvis pending. Paracentesis performed yesterday.    Review of Systems   Constitutional:  Negative for chills and diaphoresis.   Respiratory:  Positive for shortness of breath. Negative for cough.         SOB still present but improved; down to 2L   Cardiovascular:  Negative for chest pain, palpitations and leg swelling.   Gastrointestinal:  Positive for abdominal distention and abdominal pain. Negative for blood in stool, nausea and vomiting.        Abdominal distention improved since paracentesis; Some abdominal pain post paracentesis.    Genitourinary:  Negative for difficulty urinating, dysuria and hematuria.   Musculoskeletal:  Positive for arthralgias and joint swelling.        Complains of some discomfort in her feet. One toe on her right foot looks slightly erythematous around the joint   Neurological:  Negative for dizziness and headaches.     Objective:     Vital Signs (Most Recent):  Temp: 98.2 °F (36.8 °C) (08/07/24 1536)  Pulse: 74 (08/07/24 1536)  Resp: 18 (08/07/24 1536)  BP: 104/61 (08/07/24 1536)  SpO2: (!) 93 % (08/07/24 1536) Vital Signs (24h Range):  Temp:  [97.5 °F (36.4 °C)-98.2 °F (36.8 °C)] 98.2 °F (36.8 °C)  Pulse:  [59-75] 74  Resp:  [13-23] 18  SpO2:  [88 %-99 %] 93 %  BP: ()/(38-75) 104/61     Weight: 101 kg (222 lb 10.6 oz)  Body mass index is 37.05 kg/m².    Intake/Output Summary (Last 24 hours) at 8/7/2024 1613  Last data filed at 8/7/2024 1156  Gross per 24 hour   Intake 222 ml   Output --   Net 222 ml         Physical Exam  Constitutional:       General: She is not in acute distress.     Appearance: She is obese. She is not ill-appearing.   HENT:      Head: Normocephalic and  atraumatic.   Eyes:      General: No scleral icterus.  Cardiovascular:      Rate and Rhythm: Normal rate and regular rhythm.   Pulmonary:      Effort: Pulmonary effort is normal.      Breath sounds: Normal breath sounds.      Comments: Patient on 2 L nasal canula  Abdominal:      General: Bowel sounds are normal. There is no distension.      Tenderness: There is no abdominal tenderness. There is no guarding or rebound.   Musculoskeletal:      Right lower leg: No edema.      Left lower leg: No edema.      Comments: Edema improved since admission. Slight pitting edema.  Patient not wearing compression stockings.    Skin:     General: Skin is warm and dry.      Coloration: Skin is not jaundiced.   Neurological:      Mental Status: She is alert and oriented to person, place, and time.   Psychiatric:         Mood and Affect: Mood normal.         Behavior: Behavior normal.             Significant Labs: All pertinent labs within the past 24 hours have been reviewed.    Significant Imaging: I have reviewed all pertinent imaging results/findings within the past 24 hours.

## 2024-08-07 NOTE — PROGRESS NOTES
[facility] - Stepdown Flex ([place] [address])  [facility] Medicine  Progress Note    Patient Name: Vicky Alvarado  MRN: [id number]  Patient Class: IP- Inpatient   Admission Date: 7/26/2024  Length of Stay: 11 days  Attending Physician: Cory Hairston, *  Primary Care Provider: Gordy Cordero MD        Subjective:     Principal Problem:Anasarca        HPI:  Patient is a 59F with CHF (EF 60%, G2DD), HTN, Afib (s/p watchman procedure), T2DM (prev. on tirzepatide) presents to the ED w/ minor fall and progressively worsening SOB. Notes she couldn't get up after her fall, came in in a wheelchair. Notes she has had SOB for the past month with exertion, though now it is with rest as well. Reports orthopnea during this time frame. States she does not have any pain with breathing, but does additionally endorse occasional, intermittent chest pain. Denies abdominal pain, fever, or chills. States she has early satiety. No prior admission for HF exacerbation. Reports experiencing b/l lower extremity edema previously, but denies prior abdominal swelling. Swelling precluding normal ambulation, using walker (previously for balance) to assist d.t weakness. Has not missed doses of home lasix or antihypertensive medications. No reduced urine output at home.     In ED, normotensive, no tachycardia, some tachypnea (RR 22-28), initially on NRB -> HFNC 12L -> BIPAP -> 6L. No desaturations recorded during transitions in O2 therapy (BIPAP primarily placed for pulmonary edema seen on CXR). CBC with normocytic anemia (Hb 10.6), no leukocytosis. CMP with hypokalemia K 2.9, Cr 1.8/BUN 27 (baseline appears to be 1-1.4), Alb 2.7, no LFT elevation. Mg 1.5. , Troponin normal. VBG 7.46 / 42. CT AP with cirrhotic liver morphology, questionable GB sludge/stones, large volume ascites. CXR with hypoventilatory changes, R>L perihilar infiltrates c/f CHF. EKG NSR.  Received K replacement, 100mg IV Lasix in ED.       Overview/Hospital  Course:  Patient is here for SOB due to volume overload. Patient has had CHF exacerbation in the past but also has evidence of a cirrhosis requiring a paracentesis in the ED in which they removed 7 L of fluid. The ascitic fluid was sent to lab and there is no evidence of SBP at this time. Abdominal CT shows a cirrhotic morphology of the liver. US/doppler of  liver shows evidence of cirrhosis, splenotmegally, and portal hypertension. She shows no signs of jaundice. Her liver enzymes, Alk Phos, and bilirubin are wnl. Protein studies of the ascitic fluid show increased protein leaning more towards a cardiac etiology.  CXR shows heart to be boarderline in regards to being enlarged and some perihilar infiltrate. ECHO on (7/29/24) showed intermediate diastolic dysfunction with EF 60-65%, minor aortic stenosis, and moderate pulmonary hypertension with estimated pulmonary artery systolic pressure is 54 mmHg.Paracentesis performed (7/30/24) removed an additional 9.8L.  She has had some problems with confusion overnight. Believe that home BIPAP slipping off and patient not getting adequate saturation may be playing a role. Oriented by morning. Respiratory therapy switched out her mask and saturations overnight improved. Studies on ascitic fluid from paracentesis have been somewhat mixed in results. SAAG on initial paracentesis was 1.0 with a total protein of 3.2. Ascitic fluid studies on the next paracentesis (that pulled out 9.8 L) showed a SAAG of 1.5 and total protein of 2.9. IR consulted for liver biopsy to help r/o the liver as the cause of ascities. Right heart cath performed 8/5/24 and it did not show clear evidence of a cardiac component driving her ascites. Liver biopsy performed 8/5/24 showed cirhosis likley due to a congestive hepatopathy. Hepatology believes there may be another underlying cause behind the cirrhosis and  recommend further workup of other causes. Cytology fluid has been negative for malignant cells  thus far. MRI of the abdomen and pelvis are pending. PETH score was mildly positive; Hepatology spoke with patient and advised to no longer drink alcohol. Additional paracentesis performed 8/6/24.    Interval History: Liver biopsy results returned and were inconclusive. Cirrhosis was seen but likely secondary to a congestive hepatopathy. Hepatology recommends further workup for potential causes. Cariology states a cardiac origin is also unlikely. Cytology fluid negative for malignant cells. MRI abdomen/pelvis pending. Paracentesis performed yesterday.    Review of Systems   Constitutional:  Negative for chills and diaphoresis.   Respiratory:  Positive for shortness of breath. Negative for cough.         SOB still present but improved; down to 2L   Cardiovascular:  Negative for chest pain, palpitations and leg swelling.   Gastrointestinal:  Positive for abdominal distention and abdominal pain. Negative for blood in stool, nausea and vomiting.        Abdominal distention improved since paracentesis; Some abdominal pain post paracentesis.    Genitourinary:  Negative for difficulty urinating, dysuria and hematuria.   Musculoskeletal:  Positive for arthralgias and joint swelling.        Complains of some discomfort in her feet. One toe on her right foot looks slightly erythematous around the joint   Neurological:  Negative for dizziness and headaches.     Objective:     Vital Signs (Most Recent):  Temp: 98.2 °F (36.8 °C) (08/07/24 1536)  Pulse: 74 (08/07/24 1536)  Resp: 18 (08/07/24 1536)  BP: 104/61 (08/07/24 1536)  SpO2: (!) 93 % (08/07/24 1536) Vital Signs (24h Range):  Temp:  [97.5 °F (36.4 °C)-98.2 °F (36.8 °C)] 98.2 °F (36.8 °C)  Pulse:  [59-75] 74  Resp:  [13-23] 18  SpO2:  [88 %-99 %] 93 %  BP: ()/(38-75) 104/61     Weight: 101 kg (222 lb 10.6 oz)  Body mass index is 37.05 kg/m².    Intake/Output Summary (Last 24 hours) at 8/7/2024 1613  Last data filed at 8/7/2024 1156  Gross per 24 hour   Intake 222 ml    Output --   Net 222 ml         Physical Exam  Constitutional:       General: She is not in acute distress.     Appearance: She is obese. She is not ill-appearing.   HENT:      Head: Normocephalic and atraumatic.   Eyes:      General: No scleral icterus.  Cardiovascular:      Rate and Rhythm: Normal rate and regular rhythm.   Pulmonary:      Effort: Pulmonary effort is normal.      Breath sounds: Normal breath sounds.      Comments: Patient on 2 L nasal canula  Abdominal:      General: Bowel sounds are normal. There is no distension.      Tenderness: There is no abdominal tenderness. There is no guarding or rebound.   Musculoskeletal:      Right lower leg: No edema.      Left lower leg: No edema.      Comments: Edema improved since admission. Slight pitting edema.  Patient not wearing compression stockings.    Skin:     General: Skin is warm and dry.      Coloration: Skin is not jaundiced.   Neurological:      Mental Status: She is alert and oriented to person, place, and time.   Psychiatric:         Mood and Affect: Mood normal.         Behavior: Behavior normal.             Significant Labs: All pertinent labs within the past 24 hours have been reviewed.    Significant Imaging: I have reviewed all pertinent imaging results/findings within the past 24 hours.    Assessment/Plan:      * Anasarca  Concerned anasarca may represent sequelae of cirrhosis. EGD 8/2021 with Portal hypertensive gastropathy. No varices. No abd pain, fever, chills, or confusion to raise concern for SBP or HE respectively    S/p liver biopsy, pending path.   S/p x3 para 8/6    - Favor albumin if hypotensive  - LVP   -Initial SBP Gram stain negative for WBC making SBP less likely  -studies on ascites fluid  leaned slightly toward cardiac origin but some results pointed more towards the liver;   - Pending MRI A/P for further evaluation     Right heart cath performed by cardiology; Results: Right heart cath found: normal biventricular filling  pressures, borderline low CI/CO, and mild pre-capillary pulmonary HTN.     Acute on chronic diastolic heart failure  Cardiorenal syndrome  Acute HF exacerbation of uncertain etiology (no missed doses of lasix). Last TTE with EF 60% and G2DD. Concern that she may have new R-sided HF given anasarca (large volume ascites) versus now concomitant MASH cirrhosis compounding volume overloaded state and therefore needs higher diuresis at baseline. Suspect JHONATAN d.t overload - CRS.     Total 17L removed via para + 8L UOP  TTE with intermediate diastolic dysfunction with EF 60-65%, minor aortic stenosis, and moderate pulmonary hypertension with estimated pulmonary artery systolic pressure is 54 mmHg.    - Intravascularly depleted, holding lasix 80mg IV and spironolactone 50mg  - RFP BID  - BIPAP qhs and during naps     - Fluid restriction at 1200 cc with strict I/Os and daily weights   - Check Electrolytes, keep Mag >2 & K+ >4  - Ambulate as tolerated      Stage 3b chronic kidney disease  Improved    Creatine stable for now. BMP reviewed- noted Estimated Creatinine Clearance: 47 mL/min (A) (based on SCr of 1.5 mg/dL (H)). according to latest data. Based on current GFR, CKD stage is stage 3 - GFR 30-59.  Monitor UOP and serial BMP and adjust therapy as needed. Renally dose meds. Avoid nephrotoxic medications and procedures.    Currently holding diuretics due to dip in kidney function    NAFLD (nonalcoholic fatty liver disease)  Seen by Dr. Saba with hepatology for previous chronic mild elevation in LFT, determined most likely NADLF. Now with cirrhotic morphology on CT. Previous EGD with portal gastropathy. LFT normal on admit. 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     - spironolactone 25mg and Lasix 80 BID holding diuretics due to dip in kidney function      -IR consulted; performed liver biopsy; results showed cirrhosis with congestive hepatopathy; IR recommends further workup.      Volume  overload  IR consulted for diagnostic liver biopsy to help r/o the liver as a primary cause  Biopsy performed 8/5/24; awaiting results    Cardiology consulted and performed  diagnostic heart cath on 8/5/24; results did not point to the heart as the causative agent.    IR liver biopsy performed 8/5/24 results showed cirrhosis but also pointed to a congestive hepatopathy; hepatology recommends looking into other potential underlying causes    Cytology studies negative for malignant cells; MRI abdomen/pelvis pending.        Type 2 diabetes mellitus with diabetic polyneuropathy, without long-term current use of insulin  Patient's FSGs are controlled on current medication regimen.  Last A1c reviewed-   Lab Results   Component Value Date    HGBA1C 4.9 07/27/2024     Blood sugars are staying in the 80s-90s  Current correctional scale   holding as BG stable    Hold Oral hypoglycemics while patient is in the hospital.    Atrial Fibrillation (paroxysmal)  Patient with Paroxysmal (<7 days) atrial fibrillation which is controlled currently with Beta Blocker (Metoprolol succinate 100 mg BID) Patient is currently in sinus rhythm.[id number] Score: 3. Anticoagulation not indicated due to has Watchman .    Essential hypertension  Chronic, controlled. Latest blood pressure and vitals reviewed-     Temp:  [97 °F (36.1 °C)-97.7 °F (36.5 °C)]   Pulse:  [57-79]   Resp:  [15-27]   BP: (103-142)/(56-86)   SpO2:  [92 %-100 %] .   Home meds for hypertension were reviewed and noted below.   Hypertension Medications               furosemide (LASIX) 40 MG tablet Take 1 tablet (40 mg total) by mouth 2 (two) times daily. Resume as needed for edema until followup with your PCP.    lisinopriL (PRINIVIL,ZESTRIL) 40 MG tablet Take 1 tablet (40 mg total) by mouth once daily.    metoprolol succinate (TOPROL-XL) 100 MG 24 hr tablet Take 1 tablet (100 mg total) by mouth 2 (two) times daily.    NIFEdipine (PROCARDIA-XL) 60 MG (OSM) 24 hr tablet Take 1  tablet (60 mg total) by mouth once daily.            While in the hospital, will manage blood pressure as follows; Adjust home antihypertensive regimen as follows- Continue toprol for afib, hold other antihypertensives d/t need for aggressive diuresis plus presence of JHONATAN. Did add spironolactone for K retention and now presence of ascites in setting of possible cirrhosis     Will utilize p.r.n. blood pressure medication only if patient's blood pressure greater than 220/110 and she develops symptoms such as worsening chest pain or shortness of breath.      VTE Risk Mitigation (From admission, onward)           Ordered     heparin (porcine) injection 7,500 Units  Every 8 hours         07/29/24 1202     IP VTE HIGH RISK PATIENT  Once         07/27/24 0351     Place sequential compression device  Until discontinued         07/27/24 0351                    Discharge Planning   RAYO: 8/8/2024     Code Status: Full Code   Is the patient medically ready for discharge?:     Reason for patient still in hospital (select all that apply): Patient trending condition, Treatment, and Imaging  Discharge Plan A: Skilled Nursing Facility   Discharge Delays: None known at this time              Marah Castro MD  Department of Hospital Medicine   [facility] - Stepdown Flex ([place]-[facility])

## 2024-08-07 NOTE — SUBJECTIVE & OBJECTIVE
Scheduled Meds:   DULoxetine  60 mg Oral Daily    heparin (porcine)  7,500 Units Subcutaneous Q8H    melatonin  3 mg Oral QHS    metoprolol succinate  100 mg Oral BID    pantoprazole  40 mg Oral Daily     Continuous Infusions:  PRN Meds:  Current Facility-Administered Medications:     acetaminophen, 650 mg, Oral, Q4H PRN    albuterol-ipratropium, 3 mL, Nebulization, Q4H PRN    ALPRAZolam, 0.25 mg, Oral, Daily PRN    aluminum-magnesium hydroxide-simethicone, 30 mL, Oral, QID PRN    dextrose 10%, 12.5 g, Intravenous, PRN    dextrose 10%, 25 g, Intravenous, PRN    diclofenac sodium, 2 g, Topical (Top), TID PRN    glucagon (human recombinant), 1 mg, Intramuscular, PRN    glucose, 16 g, Oral, PRN    glucose, 24 g, Oral, PRN    naloxone, 0.02 mg, Intravenous, PRN    ondansetron, 8 mg, Oral, Q8H PRN    prochlorperazine, 5 mg, Intravenous, Q6H PRN    simethicone, 1 tablet, Oral, QID PRN    sodium chloride 0.9%, 10 mL, Intravenous, Q12H PRN    sodium chloride 0.9%, 10 mL, Intravenous, PRN    Review of patient's allergies indicates:   Allergen Reactions    Flecainide Shortness Of Breath and Swelling    Shellfish containing products Other (See Comments)     Makes gout terrible    Vancomycin analogues Hives, Itching and Rash        Past Medical History:   Diagnosis Date    Anticoagulant long-term use     Arthritis     Atrial fibrillation     cardioversion    Atrial fibrillation with RVR     HAS WATCHMAN IN PLACE NOW    Avascular necrosis     L hand    CHF (congestive heart failure)     Chronic fatigue     Depression     Diabetes mellitus     Encounter for blood transfusion 07/22/2020    Encounter for blood transfusion 03/2022    Fatty liver     GERD (gastroesophageal reflux disease)     Hx of psychiatric care     Hypertension     Iron deficiency anemia 05/07/2022    TIBC 444 with saturated iron 8    Liver disease     Obese     Pre-diabetes 05/07/2022    Psychiatric problem     Sleep apnea     wears cpap    SOB (shortness of  breath) on exertion     Weight loss     75lb intentional weight loss     Past Surgical History:   Procedure Laterality Date    ABLATION OF ARRHYTHMOGENIC FOCUS FOR ATRIAL FIBRILLATION N/A 07/20/2020    Procedure: Ablation atrial fibrillation;  Surgeon: Gabriel Hawley MD;  Location: [facility] EP LAB;  Service: Cardiology;  Laterality: N/A;  afib, PVI, RFA, REENA, SHADY, anes, MB, 3 Prep    ABLATION OF ARRHYTHMOGENIC FOCUS FOR ATRIAL FIBRILLATION N/A 01/24/2022    Procedure: Ablation atrial fibrillation;  Surgeon: Gabriel Hawley MD;  Location: [facility] EP LAB;  Service: Cardiology;  Laterality: N/A;  afib/afl, PVI (re-do)/CTI, RFA, REENA (cx if SR), SHADY, anes, MB, 3 Prep    APPLICATION OF WOUND VACUUM-ASSISTED CLOSURE DEVICE Left 08/03/2020    Procedure: APPLICATION, WOUND VAC;  Surgeon: SEMAJ Márquez III, MD;  Location: [facility] OR 2ND FLR;  Service: Peripheral Vascular;  Laterality: Left;    APPLICATION OF WOUND VACUUM-ASSISTED CLOSURE DEVICE Left 08/06/2020    Procedure: APPLICATION, WOUND VAC;  Surgeon: SEMAJ Márquez III, MD;  Location: [facility] OR 2ND FLR;  Service: Peripheral Vascular;  Laterality: Left;    CARPAL TUNNEL RELEASE Right 06/10/2020    Procedure: RELEASE, CARPAL TUNNEL - RIGHT;  Surgeon: Adelaida Hall MD;  Location: [facility] OR;  Service: Orthopedics;  Laterality: Right;  GENERAL AND REGIONAL    CARPAL TUNNEL RELEASE Left 05/05/2021    Procedure: RELEASE, CARPAL TUNNEL, LEFT;  Surgeon: Adelaida Hall MD;  Location: [facility] OR;  Service: Orthopedics;  Laterality: Left;  GENERAL & REGIONAL IN PACU    CARPECTOMY Left 9/6/2023    Procedure: CARPECTOMY;  Surgeon: Adelaida Hall MD;  Location: [facility] OR;  Service: Orthopedics;  Laterality: Left;  MAC/REGIONAL  LEFT PROXIMAL ROW    CLOSURE OF WOUND Left 08/06/2020    Procedure: CLOSURE, WOUND;  Surgeon: SEMAJ Márquez III, MD;  Location: [facility] OR 2ND FLR;  Service: Peripheral Vascular;  Laterality: Left;  Complex    COLONOSCOPY N/A 08/17/2021     Procedure: COLONOSCOPY Suprep;  Surgeon: Loco Deluca MD;  Location: [facility];  Service: Endoscopy;  Laterality: N/A;    ECHOCARDIOGRAM,TRANSESOPHAGEAL N/A 07/24/2023    Procedure: Transesophageal echo (DEMI) intra-procedure log documentation;  Surgeon: Leyla Diagnostic Provider;  Location: [facility] EP LAB;  Service: Cardiology;  Laterality: N/A;  s/p WM, DEMI, anes, 3 Prep    ESOPHAGOGASTRODUODENOSCOPY N/A 08/17/2021    Procedure: EGD (ESOPHAGOGASTRODUODENOSCOPY);  Surgeon: Loco Deluca MD;  Location: [facility] ENDO;  Service: Endoscopy;  Laterality: N/A;  Patient is schedule to have her Covid test on 08/14/2021 at the [facility] @ 9:30am. [place].    EXPLORATION OF FEMORAL ARTERY Left 07/21/2020    Procedure: EXPLORATION, ARTERY, FEMORAL;  Surgeon: SEMAJ Márquez III, MD;  Location: [facility] OR [address] [place] [address];  Service: Peripheral Vascular;  Laterality: Left;  1. Urgent Direct repair, L SFA branch laceration    FOOT SURGERY      HYSTERECTOMY      HYSTEROSCOPY WITH DILATION AND CURETTAGE OF UTERUS N/A 02/19/2022    Procedure: HYSTEROSCOPY, WITH DILATION AND CURETTAGE OF UTERUS;  Surgeon: Shane Palomo MD;  [facility];  Service: OB/GYN;  Laterality: N/A;    INCISION AND DRAINAGE OF KNEE Left 05/12/2022    Procedure: INCISION AND DRAINAGE, Prepatellar bursectomy.;  Surgeon: Dre Guzmán MD;  [address]  Service: Orthopedics;  Laterality: Left;    LEFT HEART CATHETERIZATION Left 02/07/2023    Procedure: Left heart cath;  Surgeon: Josiah Terry MD;  Location: [facility] CATH LAB;  Service: Cardiology;  Laterality: Left;  borderline moderate bleeding risk 6.3%    OCCLUSION OF LEFT ATRIAL APPENDAGE N/A 06/12/2023    Procedure: Left atrial appendage occlusion;  Surgeon: Gabriel Hawley MD;  Location: [facility] EP LAB;  Service: Cardiology;  Laterality: N/A;  afib, watchman, BSCI, demi, anes, MB, 3prep    RELEASE OF ULNAR NERVE AT CUBITAL TUNNEL Bilateral     RIGHT HEART CATHETERIZATION  Right 2024    Procedure: INSERTION, CATHETER, RIGHT HEART;  Surgeon: Zane Liu MD;  Location: [facility] CATH LAB;  Service: Cardiology;  Laterality: Right;    ROBOT-ASSISTED LAPAROSCOPIC ABDOMINAL HYSTERECTOMY USING DA KEIRY XI N/A 2022    Procedure: XI ROBOTIC HYSTERECTOMY;  Surgeon: Yolanda Barriga MD;  Location: [facility];  Service: OB/GYN;  Laterality: N/A;  dual console requested    ROBOT-ASSISTED LAPAROSCOPIC SALPINGO-OOPHORECTOMY Bilateral 2022    Procedure: ROBOTIC SALPINGO-OOPHORECTOMY;  Surgeon: Yolanda Barriga MD;  Location: [facility];  Service: OB/GYN;  Laterality: Bilateral;    TRANSESOPHAGEAL ECHOCARDIOGRAPHY N/A 2023    Procedure: ECHOCARDIOGRAM, TRANSESOPHAGEAL;  Surgeon: Cyril Mast MD;  Location: [facility] EP LAB;  Service: Cardiology;  Laterality: N/A;    TREATMENT OF CARDIAC ARRHYTHMIA N/A 2020    Procedure: CARDIOVERSION;  Surgeon: Gabriel Hawley MD;  Location: [facility] EP LAB;  Service: Cardiology;  Laterality: N/A;  af, demi, dccv, anes, mb, 345    TREATMENT OF CARDIAC ARRHYTHMIA  2022    Procedure: Cardioversion or Defibrillation;  Surgeon: Gabriel Hawley MD;  Location: [facility] EP LAB;  Service: Cardiology;;    WOUND DEBRIDEMENT Left 2020    Procedure: DEBRIDEMENT, WOUND;  Surgeon: SEMAJ Márquez III, MD;  Location: [address];  Service: Peripheral Vascular;  Laterality: Left;       Family History       Problem Relation (Age of Onset)    Cataracts Mother    Diabetes Father    Hypertension Mother, Father, Sister, Brother    Thyroid disease Mother          Tobacco Use    Smoking status: Former     Current packs/day: 0.00     Types: Cigarettes     Quit date: 2019     Years since quittin.1    Smokeless tobacco: Never   Substance and Sexual Activity    Alcohol use: No    Drug use: No    Sexual activity: Not Currently     Partners: Male     Birth control/protection: See Surgical Hx     Review of Systems   Skin:  Positive for wound.        Objective:     Vital Signs (Most Recent):  Temp: 97.5 °F (36.4 °C) (08/07/24 1051)  Pulse: 67 (08/07/24 1514)  Resp: 18 (08/07/24 1051)  BP: (!) 105/57 (08/07/24 1051)  SpO2: (!) 88 % (08/07/24 1051) Vital Signs (24h Range):  Temp:  [97.5 °F (36.4 °C)-97.9 °F (36.6 °C)] 97.5 °F (36.4 °C)  Pulse:  [59-75] 67  Resp:  [13-23] 18  SpO2:  [88 %-99 %] 88 %  BP: ()/(38-75) 105/57     Weight: 101 kg (222 lb 10.6 oz)  Body mass index is 37.05 kg/m².     Physical Exam  Constitutional:       Appearance: Normal appearance.   Skin:     General: Skin is warm and dry.      Findings: Lesion present.   Neurological:      Mental Status: She is alert.          Laboratory:  All pertinent labs reviewed within the last 24 hours.    Diagnostic Results:  None

## 2024-08-07 NOTE — CLINICAL REVIEW
"RAPID RESPONSE NURSE CHART REVIEW        Chart Reviewed: 08/07/2024, 8:12 AM    MRN: [id number]  Bed: 98466/73217 A    Dx: Ute Alvarado has a past medical history of Anticoagulant long-term use, Arthritis, Atrial fibrillation, Atrial fibrillation with RVR, Avascular necrosis, CHF (congestive heart failure), Chronic fatigue, Depression, Diabetes mellitus, Encounter for blood transfusion, Encounter for blood transfusion, Fatty liver, GERD (gastroesophageal reflux disease), psychiatric care, Hypertension, Iron deficiency anemia, Liver disease, Obese, Pre-diabetes, Psychiatric problem, Sleep apnea, SOB (shortness of breath) on exertion, and Weight loss.    Last VS: BP (!) 77/38 (BP Location: Right arm, Patient Position: Lying)   Pulse (!) 59   Temp 97.7 °F (36.5 °C) (Oral)   Resp 18   Ht 5' 5" (1.651 m)   Wt 101 kg (222 lb 10.6 oz)   LMP 03/25/2022 (Exact Date)   SpO2 (!) 93%   BMI 37.05 kg/m²     24H Vital Sign Range:  Temp:  [97.5 °F (36.4 °C)-97.9 °F (36.6 °C)]   Pulse:  [59-76]   Resp:  [13-26]   BP: ()/(38-75)   SpO2:  [93 %-99 %]     Level of Consciousness (AVPU): responds to voice    Recent Labs     08/05/24  0504 08/06/24  0630 08/07/24  0524   WBC 8.55 8.63 6.15   HGB 10.1* 9.7* 9.6*   HCT 33.0* 31.1* 33.1*    233 204       Recent Labs     08/05/24  0504 08/06/24  0630 08/07/24  0524    133* 136   K 4.2 4.2 3.7   CL 89* 89* 92*   CO2 35* 34* 37*   BUN 34* 35* 32*   CREATININE 1.5* 1.3 1.3   GLU 81 85 93   PHOS 3.9 3.9 3.1   MG 2.0 2.0 2.0        OXYGEN:  Flow (L/min) (Oxygen Therapy): 2  Oxygen Concentration (%): 40       MEWS score: 4    Bedside RN, Candace contacted for BP of 77/38 and HR of 59. RN reports 96/56 on recheck and pt is asymptomatic.  No additional concerns verbalized at this time. Instructed to call [phone] for further concerns or assistance.    Nancy Felipe RN       "

## 2024-08-07 NOTE — PLAN OF CARE
CM called and spoke with Chucho Xiong @ [facility] / [facility]  [phone] and updated that patients disability is pending and Rosalina will need to complete a med cost. CM sent updates          Debra Ga RN  Case Management  [facility]  [phone]

## 2024-08-07 NOTE — ASSESSMENT & PLAN NOTE
- consult received for evaluation of skin injury.  - pt presents to the ED w/ minor fall and progressively worsening SOB.  - small area of partial thickness tissue loss to buttocks with surrounding blanchable redness. Likely due to moisture from incontinence.  - continue Triad bid/prn.  - Immerse surface.  - female external urinary catheter in place.  - pillows for offloading.  - pt turns well. Encouraged to turn q2h.  - sara score documented at 15 with a nutrition sub scale score of 3.  - nursing to maintain pressure injury prevention measures and continue wound care per orders.

## 2024-08-07 NOTE — HPI
Vicky Alvarado is a 59 year old female with CHF (EF 60%, G2DD), HTN, Afib (s/p watchman procedure), T2DM (prev. on tirzepatide) presents to the ED w/ minor fall and progressively worsening SOB. Notes she couldn't get up after her fall, came in in a wheelchair. Notes she has had SOB for the past month with exertion, though now it is with rest as well. Reports orthopnea during this time frame. States she does not have any pain with breathing, but does additionally endorse occasional, intermittent chest pain. Denies abdominal pain, fever, or chills. States she has early satiety. No prior admission for HF exacerbation. Reports experiencing b/l lower extremity edema previously, but denies prior abdominal swelling. Swelling precluding normal ambulation, using walker (previously for balance) to assist d.t weakness. Has not missed doses of home lasix or antihypertensive medications. No reduced urine output at home.      In ED, normotensive, no tachycardia, some tachypnea (RR 22-28), initially on NRB -> HFNC 12L -> BIPAP -> 6L. No desaturations recorded during transitions in O2 therapy (BIPAP primarily placed for pulmonary edema seen on CXR). CBC with normocytic anemia (Hb 10.6), no leukocytosis. CMP with hypokalemia K 2.9, Cr 1.8/BUN 27 (baseline appears to be 1-1.4), Alb 2.7, no LFT elevation. Mg 1.5. , Troponin normal. VBG 7.46 / 42. CT AP with cirrhotic liver morphology, questionable GB sludge/stones, large volume ascites. CXR with hypoventilatory changes, R>L perihilar infiltrates c/f CHF. EKG NSR.  Received K replacement, 100mg IV Lasix in ED. Patient admitted to hospital medicine service for further management. Skin integrity NATALI consulted for evaluation of skin injury.

## 2024-08-07 NOTE — PT/OT/SLP PROGRESS
Physical Therapy Co-Treatment    Patient Name:  Vicky Alvarado   MRN:  [id number]    Co-evaluation and co-treatment performed for this visit due to suspected patient need for two skilled therapists to ensure patient and staff safety and to accommodate for patient activity tolerance/pain management     Recommendations:     Discharge Recommendations: Moderate Intensity Therapy  Discharge Equipment Recommendations: to be determined by next level of care  Barriers to discharge: None    Assessment:     Vicky Alvarado is a 59 y.o. female admitted with a medical diagnosis of Anasarca.  She presents with the following impairments/functional limitations: weakness, impaired endurance, impaired self care skills, impaired functional mobility, gait instability, impaired balance, decreased safety awareness, decreased lower extremity function Pt. cooperative and tolerated treatment well. Pt. progressing with mobility and was able to take small steps to bedside commode with Reid GREEN.    Rehab Prognosis: Good; patient would benefit from acute skilled PT services to address these deficits and reach maximum level of function.    Recent Surgery: Procedure(s) (LRB):  INSERTION, CATHETER, RIGHT HEART (Right) 2 Days Post-Op    Plan:     During this hospitalization, patient to be seen 4 x/week to address the identified rehab impairments via gait training, therapeutic activities, therapeutic exercises, neuromuscular re-education and progress toward the following goals:    Plan of Care Expires:  08/29/24    Subjective     Chief Complaint: weakness  Patient/Family Comments/goals: pt. Agreeable to PT  Pain/Comfort:  Pain Rating 1: 0/10  Pain Rating Post-Intervention 1: 0/10      Objective:     Communicated with nursing prior to session.  Patient found supine with pulse ox (continuous), telemetry, oxygen, peripheral IV upon PT entry to room.     General Precautions: Standard, fall  Orthopedic Precautions: N/A  Braces:  N/A  Respiratory Status: Room air     Functional Mobility:  Bed Mobility:     Rolling Right: minimum assistance  Scooting: minimum assistance  Supine to Sit: minimum assistance  Sit to Supine: minimum assistance  Transfers:     Sit to Stand:  maximal assistance with hand-held assist  Gait: 6 small steps with HHA-Max A for balance/support/guidance/weight shifting x2 trials. Pt. had seated rest break between gait trials.  Balance: fair sitting and poor standing      AM-PAC 6 CLICK MOBILITY  Turning over in bed (including adjusting bedclothes, sheets and blankets)?: 3  Sitting down on and standing up from a chair with arms (e.g., wheelchair, bedside commode, etc.): 2  Moving from lying on back to sitting on the side of the bed?: 3  Moving to and from a bed to a chair (including a wheelchair)?: 2  Need to walk in hospital room?: 1  Climbing 3-5 steps with a railing?: 1  Basic Mobility Total Score: 12       Treatment & Education:  Discussed pt.'s progress, goals, and POC. Assisted pt. to/from bedside commode.    Patient left supine with all lines intact and call button in reach..    GOALS:   Multidisciplinary Problems       Physical Therapy Goals          Problem: Physical Therapy    Goal Priority Disciplines Outcome Goal Variances Interventions   Physical Therapy Goal     PT, PT/OT Progressing     Description: Goals to be met by: 2024     Patient will increase functional independence with mobility by performin. Supine to sit with Stand-by Assistance  2. Sit to supine with Stand-by Assistance  3. Sit to stand transfer with Supervision  4. Bed to chair transfer with Supervision using LRAD  5. Gait  x 100 feet with Contact Guard Assistance using LRAD.   6. Ascend/descend 1 stair with no Handrails Contact Guard Assistance using LRAD.     The mobility limitation cannot be sufficiently resolved by the use of a cane.   Patient's functional mobility deficit can be sufficiently resolved with the use of a (bariatric  Rolling Walker or Walker).  Patient's mobility limitation significantly impairs their ability to participate in one of more activities of daily living.  The use of a (bariatric RW or Walker) will significantly improve the patient's ability to participate in MRADLS and the patient will use it on regular basis in the home.                           Time Tracking:     PT Received On: 08/07/24  PT Start Time: 0919     PT Stop Time: 0945  PT Total Time (min): 26 min     Billable Minutes: Gait Training 16 and Therapeutic Activity 10    Treatment Type: Treatment  PT/PTA: PT     Number of PTA visits since last PT visit: 0     08/07/2024

## 2024-08-07 NOTE — ASSESSMENT & PLAN NOTE
IR consulted for diagnostic liver biopsy to help r/o the liver as a primary cause  Biopsy performed 8/5/24; awaiting results    Cardiology consulted and performed  diagnostic heart cath on 8/5/24; results did not point to the heart as the causative agent.    IR liver biopsy performed 8/5/24 results showed cirrhosis but also pointed to a congestive hepatopathy; hepatology recommends looking into other potential underlying causes    Cytology studies negative for malignant cells; MRI abdomen/pelvis pending.

## 2024-08-07 NOTE — ASSESSMENT & PLAN NOTE
Histopathologic cirrhosis  Histopathologic signs of congestive hepatopathy  History of diastolic heart failure    Volume overload of unclear etiology. Noted to have imaging findings of cirrhosis and presenting with ascites. Synthetic function appears stable. Paracentesis studies obtained on presentation suggested cardiac rather than hepatic etiology of ascites - consistent over 2 paracenteses. Echo with elevated PASP but CVP of 3. RHC with normal BV filling pressures and Cardiology team determined ascites was not cardiac in nature. Cytology did not show malignant cells. Liver Bx with normal wedge pressures although pathology showed 4/4 fibrosis with histopathologic support for congestive hepatopathy as well. Cardiac contribution remains unclear but will attempt to aggressively manage ascites and optimize volume status. TIPS should be a consideration as an outpatient.    Mildly positive PETH. We have advised her that she cannot have any further alcohol given her cirrhosis.        MELD 3.0: 12 at 7/29/2024  2:27 PM  MELD-Na: 9 at 7/29/2024  2:27 PM  Calculated from:  Serum Creatinine: 1.2 mg/dL at 7/29/2024  2:27 PM  Serum Sodium: 141 mmol/L (Using max of 137 mmol/L) at 7/29/2024  2:27 PM  Total Bilirubin: 0.6 mg/dL (Using min of 1 mg/dL) at 7/28/2024  5:10 AM  Serum Albumin: 2.4 g/dL at 7/29/2024  2:27 PM  INR(ratio): 1.1 at 7/27/2024  4:35 AM  Age at listing (hypothetical): 59 years  Sex: Female at 7/29/2024  2:27 PM     Recommendations:    - Aggressive ascites management and volume optimization  - START Lasix PO 80 mg QD + Spironolactone 200 mg QD  - Daily electrolytes and strict ins/out  - Low Na diet and fluid restriction  - Obtain MRI abdomen and pelvis with contrast (malignancy consideration)  - Organize weekly OP paracentesis  - Obtain weekly CBC and CMP  - OP Hepatology Follow up for Transplant evaluation and TIPS consideration

## 2024-08-07 NOTE — PROGRESS NOTES
Asymptomatic hypotension this AM; resolved without intervention. Xanax daily PRN given this AM for anxiety. 24 hour urine collection initiated 10AM 8/7; signs placed in room and bathroom. Patient resting at this time with needs met and safety measures in place.     08/07/24 0815   Pain/Comfort/Sleep   Pain Rating (0-10): Rest 0   Cognitive   Cognitive/Neuro/Behavioral WDL WDL   Level of Consciousness (AVPU) alert   Orientation oriented x 4   Mood/Behavior cooperative   [place] Coma Scale   Best Eye Response 4-->(E4) spontaneous   Best Motor Response 6-->(M6) obeys commands   Best Verbal Response 5-->(V5) oriented   [place] Coma Scale Score 15   Motor Response   Motor Response general motor response   General Motor Response purposeful motor response   HEENT   HEENT WDL ex;vision aid   Vision Aid glasses at bedside   Respiratory   Respiratory WDL WDL   Rhythm/Pattern, Respiratory unlabored;depth regular;chest wiggle adequate;pattern regular   Expansion/Accessory Muscles/Retractions no retractions;no use of accessory muscles   Cough Frequency infrequent   Breath Sounds   Breath Sounds All Fields   All Lung Fields Breath Sounds Anterior:;diminished   Cardiac   Cardiac WDL ex;rhythm   Cardiac Rhythm radial pulse regular;apical pulse regular   Peripheral Neurovascular   Peripheral Neurovascular WDL ex;edema   Capillary Refill, General greater than 3 secs   VTE Required Core Measure Pharmacological prophylaxis initiated/maintained   VTE Prevention/Management bleeding risk assessed   Pulse Carotid   Left Carotid Pulse 2+ (normal)   Right Carotid Pulse 2+ (normal)   Pulse Radial   Left Radial Pulse 2+ (normal)   Right Radial Pulse 2+ (normal)   Pulse Popliteal   Left Popliteal Pulse 2+ (normal)   Right Popliteal Pulse 2+ (normal)   Pulse Dorsalis Pedis   Left Dorsalis Pedis Pulse 2+ (normal)   Right Dorsalis Pedis Pulse 2+ (normal)        Peripheral IV - Single Lumen 08/01/24 1053 20 G Distal;Left;Posterior Forearm    Placement Date/Time: 08/01/24 1053   Present Prior to Hospital Arrival?: Yes  Inserted by: RN  Size (G): 20 G  Orientation: Distal;Left;Posterior  Location: Forearm   Site Assessment Clean;Dry;Intact;No swelling;No redness;No warmth;No drainage   Extremity Assessment Distal to IV No swelling;No warmth;No redness;No abnormal discoloration   Line Status Saline locked   Dressing Status Clean;Dry;Intact   Dressing Intervention Integrity maintained        Peripheral IV - Single Lumen 08/01/24 1105 20 G 1 3/4 in Anterior;Left Upper Arm   Placement Date/Time: 08/01/24 1105   Present Prior to Hospital Arrival?: No  Inserted by: RN  Size (G): 20 G  Length (in): 1 3/4 in  Orientation: Anterior;Left  Location: Upper Arm  Placement directed by: Ultrasound  Site Prep: Alcohol;Chlorhexidine  ...   Site Assessment Dry;No swelling;No redness;Intact;Clean   Extremity Assessment Distal to IV No warmth;No swelling;No redness;No abnormal discoloration   Line Status Saline locked   Dressing Status Clean;Dry;Intact   Dressing Intervention Integrity maintained   Skin   Skin WDL ex;characteristics   Skin Color/Characteristics pale   Skin Temperature warm   Skin Moisture dry   Skin Elasticity quick return to original state   Skin Integrity bruised (ecchymotic)   Specialty Bed/Overlay Overlay, nonpowered/static (waffle)   Kodak Risk Assessment   Sensory Perception 3-->slightly limited   Moisture 3-->occasionally moist   Activity 1-->bedfast   Mobility 3-->slightly limited   Nutrition 3-->adequate   Friction and Shear 2-->potential problem   [place] Score 15        Wound 07/30/24 1000 Puncture Right lateral Lower quadrant   Date First Assessed/Time First Assessed: 07/30/24 1000   Primary Wound Type: (c) Puncture  Side: Right  Orientation: lateral  Location: Lower quadrant   Dressing Appearance Open to air   Drainage Amount None        Wound 08/02/24 0700 Moisture associated dermatitis Perineum   Date First Assessed/Time First Assessed:  08/02/24 0700   Present on Original Admission: Yes  Primary Wound Type: Moisture associated dermatitis  Location: Perineum   Dressing Appearance Open to air   Appearance Pink;Moist;Blistered   Musculoskeletal   Musculoskeletal WDL ex;mobility   General Mobility generalized weakness;moderately impaired   Gastrointestinal   GI WDL ex;GI symptoms   Last Bowel Movement 08/04/24   Genitourinary   Genitourinary WDL ex;voiding ability/characteristics   Voiding Characteristics voids spontaneously without difficulty   Coping/Psychosocial   Observed Emotional State calm;cooperative   Verbalized Emotional State anxiety   Plan of Care Reviewed With patient   Safety   Safety WDL WDL   Safety Factors ID band on;upper side rails raised x 2;call light in reach;wheels locked;bed in low position   All Alarms alarm(s) activated and audible   Fall Risk Assessment (every shift)   History Of Fall (W/I 3 Mos) 4-->Yes   Polypharmacy 3-->Yes   Central Nervous System/Psychotropic Medication 3-->Yes   Cardiovascular Medication 3-->Yes   Age Greater Than 65 Years 0-->No   Altered Elimination 2-->Yes   Cognitive Deficit 0-->No   Sensory Deficit 0-->No   Dizziness/Vertigo 0-->No   Depression 0-->No   Mobility Deficit/Weakness 2-->Yes   Male 0-->No   Fall Risk Score 17   ABC Risk for Fall with Injury Assessment   A= Age: Is the patient greater than or equal to 85 years old or frail due to clinical condition? No   B=Bones: Does the patient have osteoporosis, previous fracture, prolonged steroid use, or metastatic bone cancer? No   C=Coagulation Disorders: Does the patient have a bleeding disorder, either through anticoagulants or underlying clinical condition? No   S=recent Surgery: Is the patient post-op surgicalwith a recent lower limb amputation or recent major abdominal or thoracic surgery? No   Safety Management   Safety Promotion/Fall Prevention assistive device/personal item within reach;lighting adjusted;medications reviewed;nonskid  shoes/socks when out of bed   Medication Review/Management medications reviewed   Patient Rounds bed in low position;bed wheels locked;clutter free environment maintained;ID band on;toileting offered;visualized patient;placement of personal items at bedside;call light in patient/parent reach   Safety Bands on Patient Allergy Band   Daily Care   Activity Management Rolling - L1   Activity Assistance Provided assistance, 1 person   Mobility   GEMS ([place] Early Mobility Scale) Level 2-Edge of bed activities with assist   Positioning   Body Position weight shifting   Head of Bed (HOB) Positioning HOB elevated   Positioning/Transfer Devices pillows;in use

## 2024-08-07 NOTE — PROGRESS NOTES
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 She also describes ongoing weakness.     Per chart review, she has had steady increase in weight and abdominal girth for several months with an accelerated progression of symptoms over the last week. She presented with increased oxygen requirements and tachypnea and was initially placed on NRB. Labs were significant for hypokalemia 2.9 and elevated Cr to 1.8 with BNP of 410.     CTAP to evaluate abdominal distention showed cirrhotic morphology of the liver and large volume ascites. Liver U/S on presentation showed cirrhotic liver morphology with portal HTN, splenomegaly and moderate volume ascites. Paracentesis was completed with removal of 7L of fluid. Initial SAAG was 1.0 with total ascitic protein of 3.2. Repeat paracentesis with removal of 10L on 7/30 had SAAG of 1.5 with total ascitic protein of 2.9. Cytology pending.     Of note Echo cardiogram performed showed indeterminate diastolic function, RV systolic function boderline low, PASP of 54 but normal CVP (3 mm Hg) and EF of 60 - 65%. As an outpatient, she was planned to have transjugular biopsy with pressure measurments to determine if she has cirrhosis histologically but appears to have been lost to follow up since 4/18     Hepatology was consulted for consideration of liver biopsy in the setting of volume overload to determine if hepatic  etiology.    Interval History: S/P Paracentesis with removal of 10L. Total protein in ascitic fluid 2.2. Liver Bx pathology with 4/4 fibrosis and evidence of hepatic congestion. PETH mildly positive.     Current Facility-Administered Medications   Medication    acetaminophen tablet 650 mg    albuterol-ipratropium 2.5 mg-0.5 mg/3 mL nebulizer solution 3 mL    ALPRAZolam tablet 0.25 mg    aluminum-magnesium hydroxide-simethicone 200-200-20 mg/5 mL suspension 30 mL    dextrose 10% bolus 125 mL 125 mL    dextrose 10% bolus 250 mL 250 mL    diclofenac sodium 1 % gel 2 g    DULoxetine DR capsule 60 mg    glucagon (human recombinant) injection 1 mg    glucose chewable tablet 16 g    glucose chewable tablet 24 g    heparin (porcine) injection 7,500 Units    melatonin tablet 3 mg    metoprolol succinate (TOPROL-XL) 24 hr tablet 100 mg    naloxone 0.4 mg/mL injection 0.02 mg    ondansetron disintegrating tablet 8 mg    pantoprazole EC tablet 40 mg    prochlorperazine injection Soln 5 mg    simethicone chewable tablet 80 mg    sodium chloride 0.9% flush 10 mL    sodium chloride 0.9% flush 10 mL     Facility-Administered Medications Ordered in Other Encounters   Medication    mupirocin 2 % ointment       Objective:     Vital Signs (Most Recent):  Temp: 97.5 °F (36.4 °C) (08/07/24 1051)  Pulse: 62 (08/07/24 1051)  Resp: 18 (08/07/24 1051)  BP: (!) 105/57 (08/07/24 1051)  SpO2: (!) 88 % (08/07/24 1051) Vital Signs (24h Range):  Temp:  [97.5 °F (36.4 °C)-97.9 °F (36.6 °C)] 97.5 °F (36.4 °C)  Pulse:  [59-76] 62  Resp:  [13-23] 18  SpO2:  [88 %-99 %] 88 %  BP: ()/(38-75) 105/57     Weight: 101 kg (222 lb 10.6 oz) (08/04/24 0400)  Body mass index is 37.05 kg/m².       Physical Exam  Constitutional:       Appearance: Normal appearance. She is normal weight.   HENT:      Head: Normocephalic and atraumatic.      Mouth/Throat:      Mouth: Mucous membranes are moist.   Eyes:      Extraocular Movements: Extraocular movements intact.       Conjunctiva/sclera: Conjunctivae normal.      Pupils: Pupils are equal, round, and reactive to light.   Cardiovascular:      Rate and Rhythm: Normal rate and regular rhythm.      Heart sounds: Normal heart sounds. No murmur heard.  Pulmonary:      Effort: Pulmonary effort is normal. No respiratory distress.      Breath sounds: Normal breath sounds. No wheezing or rhonchi.   Abdominal:      General: Abdomen is flat. Bowel sounds are normal. There is no distension.      Palpations: Abdomen is soft.      Tenderness: There is no abdominal tenderness.   Musculoskeletal:         General: No swelling. Normal range of motion.      Cervical back: Normal range of motion and neck supple.      Right lower leg: Edema present.      Left lower leg: Edema present.   Skin:     General: Skin is warm and dry.      Coloration: Skin is not jaundiced.   Neurological:      General: No focal deficit present.      Mental Status: She is alert and oriented to person, place, and time.   Psychiatric:         Mood and Affect: Mood normal.            MELD 3.0: 12 at 7/29/2024  2:27 PM  MELD-Na: 9 at 7/29/2024  2:27 PM  Calculated from:  Serum Creatinine: 1.2 mg/dL at 7/29/2024  2:27 PM  Serum Sodium: 141 mmol/L (Using max of 137 mmol/L) at 7/29/2024  2:27 PM  Total Bilirubin: 0.6 mg/dL (Using min of 1 mg/dL) at 7/28/2024  5:10 AM  Serum Albumin: 2.4 g/dL at 7/29/2024  2:27 PM  INR(ratio): 1.1 at 7/27/2024  4:35 AM  Age at listing (hypothetical): 59 years  Sex: Female at 7/29/2024  2:27 PM      Significant Labs:  CBC:   Recent Labs   Lab 08/07/24 0524   WBC 6.15   RBC 3.54*   HGB 9.6*   HCT 33.1*        CMP:   Recent Labs   Lab 08/07/24 0524   GLU 93   CALCIUM 9.3   ALBUMIN 2.6*   PROT 6.1      K 3.7   CO2 37*   CL 92*   BUN 32*   CREATININE 1.3   ALKPHOS 153*   ALT 5*   AST 21   BILITOT 1.0       Significant Imaging:  Labs: Reviewed  Assessment/Plan:     Endocrine  Volume overload  Histopathologic cirrhosis  Histopathologic  signs of congestive hepatopathy  History of diastolic heart failure    Volume overload of unclear etiology. Noted to have imaging findings of cirrhosis and presenting with ascites. Synthetic function appears stable. Paracentesis studies obtained on presentation suggested cardiac rather than hepatic etiology of ascites - consistent over 2 paracenteses. Echo with elevated PASP but CVP of 3. RHC with normal BV filling pressures and Cardiology team determined ascites was not cardiac in nature. Cytology did not show malignant cells. Liver Bx with normal wedge pressures although pathology showed 4/4 fibrosis with histopathologic support for congestive hepatopathy as well. Cardiac contribution remains unclear but will attempt to aggressively manage ascites and optimize volume status. TIPS should be a consideration as an outpatient.    Mildly positive PETH. We have advised her that she cannot have any further alcohol given her cirrhosis.        MELD 3.0: 12 at 7/29/2024  2:27 PM  MELD-Na: 9 at 7/29/2024  2:27 PM  Calculated from:  Serum Creatinine: 1.2 mg/dL at 7/29/2024  2:27 PM  Serum Sodium: 141 mmol/L (Using max of 137 mmol/L) at 7/29/2024  2:27 PM  Total Bilirubin: 0.6 mg/dL (Using min of 1 mg/dL) at 7/28/2024  5:10 AM  Serum Albumin: 2.4 g/dL at 7/29/2024  2:27 PM  INR(ratio): 1.1 at 7/27/2024  4:35 AM  Age at listing (hypothetical): 59 years  Sex: Female at 7/29/2024  2:27 PM     Recommendations:    - Aggressive ascites management and volume optimization  - START Lasix PO 80 mg QD + Spironolactone 200 mg QD  - Daily electrolytes and strict ins/out  - Low Na diet and fluid restriction  - Obtain MRI abdomen and pelvis with contrast (malignancy consideration)  - Organize weekly OP paracentesis  - Obtain weekly CBC and CMP  - OP Hepatology Follow up for Transplant evaluation and TIPS consideration          Thank you for your consult. I will follow-up with patient. Please contact us if you have any additional  questions.    Rosario Diehl MD  Hepatology  [facility] - Stepdown Flex ([place])

## 2024-08-07 NOTE — SUBJECTIVE & OBJECTIVE
Interval History: S/P Paracentesis with removal of 10L. Total protein in ascitic fluid 2.2. Liver Bx pathology with 4/4 fibrosis and evidence of hepatic congestion. PETH mildly positive.     Current Facility-Administered Medications   Medication    acetaminophen tablet 650 mg    albuterol-ipratropium 2.5 mg-0.5 mg/3 mL nebulizer solution 3 mL    ALPRAZolam tablet 0.25 mg    aluminum-magnesium hydroxide-simethicone 200-200-20 mg/5 mL suspension 30 mL    dextrose 10% bolus 125 mL 125 mL    dextrose 10% bolus 250 mL 250 mL    diclofenac sodium 1 % gel 2 g    DULoxetine DR capsule 60 mg    glucagon (human recombinant) injection 1 mg    glucose chewable tablet 16 g    glucose chewable tablet 24 g    heparin (porcine) injection 7,500 Units    melatonin tablet 3 mg    metoprolol succinate (TOPROL-XL) 24 hr tablet 100 mg    naloxone 0.4 mg/mL injection 0.02 mg    ondansetron disintegrating tablet 8 mg    pantoprazole EC tablet 40 mg    prochlorperazine injection Soln 5 mg    simethicone chewable tablet 80 mg    sodium chloride 0.9% flush 10 mL    sodium chloride 0.9% flush 10 mL     Facility-Administered Medications Ordered in Other Encounters   Medication    mupirocin 2 % ointment       Objective:     Vital Signs (Most Recent):  Temp: 97.5 °F (36.4 °C) (08/07/24 1051)  Pulse: 62 (08/07/24 1051)  Resp: 18 (08/07/24 1051)  BP: (!) 105/57 (08/07/24 1051)  SpO2: (!) 88 % (08/07/24 1051) Vital Signs (24h Range):  Temp:  [97.5 °F (36.4 °C)-97.9 °F (36.6 °C)] 97.5 °F (36.4 °C)  Pulse:  [59-76] 62  Resp:  [13-23] 18  SpO2:  [88 %-99 %] 88 %  BP: ()/(38-75) 105/57     Weight: 101 kg (222 lb 10.6 oz) (08/04/24 0400)  Body mass index is 37.05 kg/m².       Physical Exam  Constitutional:       Appearance: Normal appearance. She is normal weight.   HENT:      Head: Normocephalic and atraumatic.      Mouth/Throat:      Mouth: Mucous membranes are moist.   Eyes:      Extraocular Movements: Extraocular movements intact.       Conjunctiva/sclera: Conjunctivae normal.      Pupils: Pupils are equal, round, and reactive to light.   Cardiovascular:      Rate and Rhythm: Normal rate and regular rhythm.      Heart sounds: Normal heart sounds. No murmur heard.  Pulmonary:      Effort: Pulmonary effort is normal. No respiratory distress.      Breath sounds: Normal breath sounds. No wheezing or rhonchi.   Abdominal:      General: Abdomen is flat. Bowel sounds are normal. There is no distension.      Palpations: Abdomen is soft.      Tenderness: There is no abdominal tenderness.   Musculoskeletal:         General: No swelling. Normal range of motion.      Cervical back: Normal range of motion and neck supple.      Right lower leg: Edema present.      Left lower leg: Edema present.   Skin:     General: Skin is warm and dry.      Coloration: Skin is not jaundiced.   Neurological:      General: No focal deficit present.      Mental Status: She is alert and oriented to person, place, and time.   Psychiatric:         Mood and Affect: Mood normal.            MELD 3.0: 12 at 7/29/2024  2:27 PM  MELD-Na: 9 at 7/29/2024  2:27 PM  Calculated from:  Serum Creatinine: 1.2 mg/dL at 7/29/2024  2:27 PM  Serum Sodium: 141 mmol/L (Using max of 137 mmol/L) at 7/29/2024  2:27 PM  Total Bilirubin: 0.6 mg/dL (Using min of 1 mg/dL) at 7/28/2024  5:10 AM  Serum Albumin: 2.4 g/dL at 7/29/2024  2:27 PM  INR(ratio): 1.1 at 7/27/2024  4:35 AM  Age at listing (hypothetical): 59 years  Sex: Female at 7/29/2024  2:27 PM      Significant Labs:  CBC:   Recent Labs   Lab 08/07/24 0524   WBC 6.15   RBC 3.54*   HGB 9.6*   HCT 33.1*        CMP:   Recent Labs   Lab 08/07/24 0524   GLU 93   CALCIUM 9.3   ALBUMIN 2.6*   PROT 6.1      K 3.7   CO2 37*   CL 92*   BUN 32*   CREATININE 1.3   ALKPHOS 153*   ALT 5*   AST 21   BILITOT 1.0       Significant Imaging:  Labs: Reviewed

## 2024-08-07 NOTE — CONSULTS
[facility] - Stepdown [facility] ([place])  Skin Integrity NATALI  Consult Note    Patient Name: Vicky Alvarado  MRN: [id number]  Admission Date: 7/26/2024  Hospital Length of Stay: 11 days  Attending Physician: Cory Hairston, *  Primary Care Provider: Gordy Cordero MD     Inpatient consult to Skin Integrity  Practitioner  Consult performed by: Angela Valero NP  Consult ordered by: Cory Hairston MD        Subjective:     History of Present Illness:  Vicky Alvarado is a 59 year old female with CHF (EF 60%, G2DD), HTN, Afib (s/p watchman procedure), T2DM (prev. on tirzepatide) presents to the ED w/ minor fall and progressively worsening SOB. Notes she couldn't get up after her fall, came in in a wheelchair. Notes she has had SOB for the past month with exertion, though now it is with rest as well. Reports orthopnea during this time frame. States she does not have any pain with breathing, but does additionally endorse occasional, intermittent chest pain. Denies abdominal pain, fever, or chills. States she has early satiety. No prior admission for HF exacerbation. Reports experiencing b/l lower extremity edema previously, but denies prior abdominal swelling. Swelling precluding normal ambulation, using walker (previously for balance) to assist d.t weakness. Has not missed doses of home lasix or antihypertensive medications. No reduced urine output at home.      In ED, normotensive, no tachycardia, some tachypnea (RR 22-28), initially on NRB -> HFNC 12L -> BIPAP -> 6L. No desaturations recorded during transitions in O2 therapy (BIPAP primarily placed for pulmonary edema seen on CXR). CBC with normocytic anemia (Hb 10.6), no leukocytosis. CMP with hypokalemia K 2.9, Cr 1.8/BUN 27 (baseline appears to be 1-1.4), Alb 2.7, no LFT elevation. Mg 1.5. , Troponin normal. VBG 7.46 / 42. CT AP with cirrhotic liver morphology, questionable GB sludge/stones, large volume ascites. CXR with  hypoventilatory changes, R>L perihilar infiltrates c/f CHF. EKG NSR.  Received K replacement, 100mg IV Lasix in ED. Patient admitted to hospital medicine service for further management. Skin integrity NATALI consulted for evaluation of skin injury.    Scheduled Meds:   DULoxetine  60 mg Oral Daily    heparin (porcine)  7,500 Units Subcutaneous Q8H    melatonin  3 mg Oral QHS    metoprolol succinate  100 mg Oral BID    pantoprazole  40 mg Oral Daily     Continuous Infusions:  PRN Meds:  Current Facility-Administered Medications:     acetaminophen, 650 mg, Oral, Q4H PRN    albuterol-ipratropium, 3 mL, Nebulization, Q4H PRN    ALPRAZolam, 0.25 mg, Oral, Daily PRN    aluminum-magnesium hydroxide-simethicone, 30 mL, Oral, QID PRN    dextrose 10%, 12.5 g, Intravenous, PRN    dextrose 10%, 25 g, Intravenous, PRN    diclofenac sodium, 2 g, Topical (Top), TID PRN    glucagon (human recombinant), 1 mg, Intramuscular, PRN    glucose, 16 g, Oral, PRN    glucose, 24 g, Oral, PRN    naloxone, 0.02 mg, Intravenous, PRN    ondansetron, 8 mg, Oral, Q8H PRN    prochlorperazine, 5 mg, Intravenous, Q6H PRN    simethicone, 1 tablet, Oral, QID PRN    sodium chloride 0.9%, 10 mL, Intravenous, Q12H PRN    sodium chloride 0.9%, 10 mL, Intravenous, PRN    Review of patient's allergies indicates:   Allergen Reactions    Flecainide Shortness Of Breath and Swelling    Shellfish containing products Other (See Comments)     Makes gout terrible    Vancomycin analogues Hives, Itching and Rash        Past Medical History:   Diagnosis Date    Anticoagulant long-term use     Arthritis     Atrial fibrillation     cardioversion    Atrial fibrillation with RVR     HAS WATCHMAN IN PLACE NOW    Avascular necrosis     L hand    CHF (congestive heart failure)     Chronic fatigue     Depression     Diabetes mellitus     Encounter for blood transfusion 07/22/2020    Encounter for blood transfusion 03/2022    Fatty liver     GERD (gastroesophageal reflux disease)      Hx of psychiatric care     Hypertension     Iron deficiency anemia 05/07/2022    TIBC 444 with saturated iron 8    Liver disease     Obese     Pre-diabetes 05/07/2022    Psychiatric problem     Sleep apnea     wears cpap    SOB (shortness of breath) on exertion     Weight loss     75lb intentional weight loss     Past Surgical History:   Procedure Laterality Date    ABLATION OF ARRHYTHMOGENIC FOCUS FOR ATRIAL FIBRILLATION N/A 07/20/2020    Procedure: Ablation atrial fibrillation;  Surgeon: Gabriel Hawley MD;  Location: [facility] EP LAB;  Service: Cardiology;  Laterality: N/A;  afib, PVI, RFA, REENA, SHADY, anes, MB, 3 Prep    ABLATION OF ARRHYTHMOGENIC FOCUS FOR ATRIAL FIBRILLATION N/A 01/24/2022    Procedure: Ablation atrial fibrillation;  Surgeon: Gabriel Hawley MD;  Location: [facility] EP LAB;  Service: Cardiology;  Laterality: N/A;  afib/afl, PVI (re-do)/CTI, RFA, REENA (cx if SR), SHADY, anes, MB, 3 Prep    APPLICATION OF WOUND VACUUM-ASSISTED CLOSURE DEVICE Left 08/03/2020    Procedure: APPLICATION, WOUND VAC;  Surgeon: SEMAJ Márquez III, MD;  Location: [facility] OR 2ND FLR;  Service: Peripheral Vascular;  Laterality: Left;    APPLICATION OF WOUND VACUUM-ASSISTED CLOSURE DEVICE Left 08/06/2020    Procedure: APPLICATION, WOUND VAC;  Surgeon: SEMAJ Márquez III, MD;  Location: [facility] OR 2ND FLR;  Service: Peripheral Vascular;  Laterality: Left;    CARPAL TUNNEL RELEASE Right 06/10/2020    Procedure: RELEASE, CARPAL TUNNEL - RIGHT;  Surgeon: Adelaida Hall MD;  Location: [facility] OR;  Service: Orthopedics;  Laterality: Right;  GENERAL AND REGIONAL    CARPAL TUNNEL RELEASE Left 05/05/2021    Procedure: RELEASE, CARPAL TUNNEL, LEFT;  Surgeon: Adelaida Hall MD;  Location: [facility] OR;  Service: Orthopedics;  Laterality: Left;  GENERAL & REGIONAL IN PACU    CARPECTOMY Left 9/6/2023    Procedure: CARPECTOMY;  Surgeon: Adelaida Hall MD;  Location: [facility] OR;  Service: Orthopedics;  Laterality: Left;   MAC/REGIONAL  LEFT PROXIMAL ROW    CLOSURE OF WOUND Left 08/06/2020    Procedure: CLOSURE, WOUND;  Surgeon: SEMAJ Márquez III, MD;  Location: [facility] OR [address] [place] [address];  Service: Peripheral Vascular;  Laterality: Left;  Complex    COLONOSCOPY N/A 08/17/2021    Procedure: COLONOSCOPY Suprep;  Surgeon: Loco Deluca MD;  Location: [facility] ENDO;  Service: Endoscopy;  Laterality: N/A;    ECHOCARDIOGRAM,TRANSESOPHAGEAL N/A 07/24/2023    Procedure: Transesophageal echo (REENA) intra-procedure log documentation;  Surgeon: Dosdane Diagnostic Provider;  Location: [facility] EP LAB;  Service: Cardiology;  Laterality: N/A;  s/p WM, REENA, anes, 3 Prep    ESOPHAGOGASTRODUODENOSCOPY N/A 08/17/2021    Procedure: EGD (ESOPHAGOGASTRODUODENOSCOPY);  Surgeon: Loco Deluca MD;  Location: [facility] ENDO;  Service: Endoscopy;  Laterality: N/A;  Patient is schedule to have her Covid test on 08/14/2021 at the [facility] @ 9:30am. [place].    EXPLORATION OF FEMORAL ARTERY Left 07/21/2020    Procedure: EXPLORATION, ARTERY, FEMORAL;  Surgeon: SEMAJ Márquez III, MD;  Location: [address];  Service: Peripheral Vascular;  Laterality: Left;  1. Urgent Direct repair, L SFA branch laceration    FOOT SURGERY      HYSTERECTOMY      HYSTEROSCOPY WITH DILATION AND CURETTAGE OF UTERUS N/A 02/19/2022    Procedure: HYSTEROSCOPY, WITH DILATION AND CURETTAGE OF UTERUS;  Surgeon: Shane Palomo MD;  [facility];  Service: OB/GYN;  Laterality: N/A;    INCISION AND DRAINAGE OF KNEE Left 05/12/2022    Procedure: INCISION AND DRAINAGE, Prepatellar bursectomy.;  Surgeon: Dre Guzmán MD;  [facility];  Service: Orthopedics;  Laterality: Left;    LEFT HEART CATHETERIZATION Left 02/07/2023    Procedure: Left heart cath;  Surgeon: Josiah Terry MD;  Location: [facility] CATH LAB;  Service: Cardiology;  Laterality: Left;  borderline moderate bleeding risk 6.3%    OCCLUSION OF LEFT ATRIAL APPENDAGE N/A 06/12/2023    Procedure: Left  atrial appendage occlusion;  Surgeon: Gabriel Hawley MD;  Location: [facility] EP LAB;  Service: Cardiology;  Laterality: N/A;  afib, watchman, BSCI, demi, DESTINY ibarra, 3prep    RELEASE OF ULNAR NERVE AT CUBITAL TUNNEL Bilateral     RIGHT HEART CATHETERIZATION Right 8/5/2024    Procedure: INSERTION, CATHETER, RIGHT HEART;  Surgeon: Zane Liu MD;  Location: [facility] CATH LAB;  Service: Cardiology;  Laterality: Right;    ROBOT-ASSISTED LAPAROSCOPIC ABDOMINAL HYSTERECTOMY USING DA KEIRY XI N/A 08/09/2022    Procedure: XI ROBOTIC HYSTERECTOMY;  Surgeon: Yolanda Barriga MD;  Location: [facility] [place] OR;  Service: OB/GYN;  Laterality: N/A;  dual console requested    ROBOT-ASSISTED LAPAROSCOPIC SALPINGO-OOPHORECTOMY Bilateral 08/09/2022    Procedure: ROBOTIC SALPINGO-OOPHORECTOMY;  Surgeon: Yolanda Barriga MD;  Location: [facility];  Service: OB/GYN;  Laterality: Bilateral;    TRANSESOPHAGEAL ECHOCARDIOGRAPHY N/A 06/12/2023    Procedure: ECHOCARDIOGRAM, TRANSESOPHAGEAL;  Surgeon: Cyril Mast MD;  Location: [facility] EP LAB;  Service: Cardiology;  Laterality: N/A;    TREATMENT OF CARDIAC ARRHYTHMIA N/A 01/29/2020    Procedure: CARDIOVERSION;  Surgeon: Gabriel Hawley MD;  Location: [facility] EP LAB;  Service: Cardiology;  Laterality: N/A;  af, demi, dccv, destiny ibarra, 345    TREATMENT OF CARDIAC ARRHYTHMIA  01/24/2022    Procedure: Cardioversion or Defibrillation;  Surgeon: Gabriel Hawley MD;  Location: [facility] EP LAB;  Service: Cardiology;;    WOUND DEBRIDEMENT Left 08/06/2020    Procedure: DEBRIDEMENT, WOUND;  Surgeon: SEMAJ Márquez III, MD;  Location: [facility] 2ND FLR;  Service: Peripheral Vascular;  Laterality: Left;       Family History       Problem Relation (Age of Onset)    Cataracts Mother    Diabetes Father    Hypertension Mother, Father, Sister, Brother    Thyroid disease Mother          Tobacco Use    Smoking status: Former     Current packs/day: 0.00     Types: Cigarettes     Quit date: 7/1/2019     Years since quitting:  5.1    Smokeless tobacco: Never   Substance and Sexual Activity    Alcohol use: No    Drug use: No    Sexual activity: Not Currently     Partners: Male     Birth control/protection: See Surgical Hx     Review of Systems   Skin:  Positive for wound.       Objective:     Vital Signs (Most Recent):  Temp: 97.5 °F (36.4 °C) (08/07/24 1051)  Pulse: 67 (08/07/24 1514)  Resp: 18 (08/07/24 1051)  BP: (!) 105/57 (08/07/24 1051)  SpO2: (!) 88 % (08/07/24 1051) Vital Signs (24h Range):  Temp:  [97.5 °F (36.4 °C)-97.9 °F (36.6 °C)] 97.5 °F (36.4 °C)  Pulse:  [59-75] 67  Resp:  [13-23] 18  SpO2:  [88 %-99 %] 88 %  BP: ()/(38-75) 105/57     Weight: 101 kg (222 lb 10.6 oz)  Body mass index is 37.05 kg/m².     Physical Exam  Constitutional:       Appearance: Normal appearance.   Skin:     General: Skin is warm and dry.      Findings: Lesion present.   Neurological:      Mental Status: She is alert.          Laboratory:  All pertinent labs reviewed within the last 24 hours.    Diagnostic Results:  None      Assessment/Plan:              NATALI Skin Integrity Evaluation      Skin Integrity NATALI evaluation of patient as part of the comprehensive skin care team.     She has been admitted for 12 days. Skin injury was noted on 8/2/24. POA no.    Buttocks        Derm  Irritant contact dermatitis due to fecal, urinary or dual incontinence  - consult received for evaluation of skin injury.  - pt presents to the ED w/ minor fall and progressively worsening SOB.  - small area of partial thickness tissue loss to buttocks with surrounding blanchable redness. Likely due to moisture from incontinence.  - continue Triad bid/prn.  - Immerse surface.  - female external urinary catheter in place.  - pillows for offloading.  - pt turns well. Encouraged to turn q2h.  - sara score documented at 15 with a nutrition sub scale score of 3.  - nursing to maintain pressure injury prevention measures and continue wound care per orders.        Thank you for  your consult. I will follow-up with patient. Please contact us if you have any additional questions.      Angela Valero NP  Skin Integrity NATALI  [facility] ([place])

## 2024-08-07 NOTE — ASSESSMENT & PLAN NOTE
Seen by Dr. Saba with hepatology for previous chronic mild elevation in LFT, determined most likely NADLF. Now with cirrhotic morphology on CT. Previous EGD with portal gastropathy. LFT normal on admit. 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     - spironolactone 25mg and Lasix 80 BID holding diuretics due to dip in kidney function      -IR consulted; performed liver biopsy; results showed cirrhosis with congestive hepatopathy; IR recommends further workup.

## 2024-08-07 NOTE — PT/OT/SLP PROGRESS
Occupational Therapy   Treatment    Name: Vicky Alvarado  MRN: [id number]  Admitting Diagnosis:  Anasarca  2 Days Post-Op    Recommendations:     Discharge Recommendations: Moderate Intensity Therapy  Discharge Equipment Recommendations:  walker, rolling  Barriers to discharge:  None    Assessment:     Vicky Alvarado is a 59 y.o. female with a medical diagnosis of Anasarca. Performance deficits affecting function are weakness, impaired endurance, decreased coordination, impaired self care skills, impaired functional mobility, gait instability, impaired balance.     Rehab Prognosis:  Good; patient would benefit from acute skilled OT services to address these deficits and reach maximum level of function.       Plan:     Patient to be seen 4 x/week to address the above listed problems via self-care/home management, therapeutic activities, therapeutic exercises  Plan of Care Expires: 08/29/24  Plan of Care Reviewed with: patient    Subjective     Pain/Comfort:  Pain Rating 1: 0/10    Objective:     Communicated with: rn prior to session.  Patient found supine with peripheral IV, oxygen, pulse ox (continuous), telemetry upon OT entry to room.    General Precautions: Standard, fall    Orthopedic Precautions:N/A  Braces: N/A  Respiratory Status: Room air     Occupational Performance:     Bed Mobility:    Patient completed Supine to Sit with minimum assistance  Patient completed Sit to Supine with minimum assistance     Functional Mobility/Transfers:  Patient completed Sit <> Stand Transfer with maximal assistance  with  no assistive device   Patient completed Toilet Transfer Stand Pivot technique with maximal assistance with  no AD    Activities of Daily Living:  Grooming: stand by assistance sitting EOB.  Toileting: moderate assistance was able to do pericare but needed help with purewick panties.    [facility] 6 Click ADL: 18    Treatment & Education:  Discussed OT POC and progress.    Patient left supine with  all lines intact and call button in reach    GOALS:   Multidisciplinary Problems       Occupational Therapy Goals          Problem: Occupational Therapy    Goal Priority Disciplines Outcome Interventions   Occupational Therapy Goal     OT, PT/OT Progressing    Description: Goals to be met by: 8/5/2024     Patient will increase functional independence with ADLs by performing:    UE Dressing with Set-up Assistance.  LE Dressing with Minimal Assistance.  Grooming while standing at sink with Supervision.  Toileting from toilet with Supervision for hygiene and clothing management.   Toilet transfer to toilet with Supervision.                         Time Tracking:     OT Date of Treatment: 08/07/24  OT Start Time: 0921  OT Stop Time: 0944  OT Total Time (min): 23 min    Billable Minutes:Self Care/Home Management 10  Therapeutic Activity 13    OT/GASPER: OT          8/7/2024

## 2024-08-08 LAB
ALBUMIN SERPL BCP-MCNC: 2.5 G/DL (ref 3.5–5.2)
ALP SERPL-CCNC: 162 U/L (ref 55–135)
ALT SERPL W/O P-5'-P-CCNC: <5 U/L (ref 10–44)
ANION GAP SERPL CALC-SCNC: 9 MMOL/L (ref 8–16)
AST SERPL-CCNC: 24 U/L (ref 10–40)
BILIRUB SERPL-MCNC: 0.8 MG/DL (ref 0.1–1)
BUN SERPL-MCNC: 29 MG/DL (ref 6–20)
CALCIUM SERPL-MCNC: 9.3 MG/DL (ref 8.7–10.5)
CHLORIDE SERPL-SCNC: 93 MMOL/L (ref 95–110)
CO2 SERPL-SCNC: 32 MMOL/L (ref 23–29)
CREAT SERPL-MCNC: 1.2 MG/DL (ref 0.5–1.4)
EST. GFR  (NO RACE VARIABLE): 51.8 ML/MIN/1.73 M^2
GLUCOSE SERPL-MCNC: 101 MG/DL (ref 70–110)
MAGNESIUM SERPL-MCNC: 1.9 MG/DL (ref 1.6–2.6)
PHOSPHATE SERPL-MCNC: 2.7 MG/DL (ref 2.7–4.5)
POTASSIUM SERPL-SCNC: 3.6 MMOL/L (ref 3.5–5.1)
PROT SERPL-MCNC: 5.9 G/DL (ref 6–8.4)
SODIUM SERPL-SCNC: 134 MMOL/L (ref 136–145)

## 2024-08-08 PROCEDURE — 25000003 PHARM REV CODE 250

## 2024-08-08 PROCEDURE — 27100171 HC OXYGEN HIGH FLOW UP TO 24 HOURS

## 2024-08-08 PROCEDURE — 80053 COMPREHEN METABOLIC PANEL: CPT | Performed by: STUDENT IN AN ORGANIZED HEALTH CARE EDUCATION/TRAINING PROGRAM

## 2024-08-08 PROCEDURE — 63600175 PHARM REV CODE 636 W HCPCS

## 2024-08-08 PROCEDURE — 97116 GAIT TRAINING THERAPY: CPT | Mod: CQ

## 2024-08-08 PROCEDURE — 36415 COLL VENOUS BLD VENIPUNCTURE: CPT | Performed by: STUDENT IN AN ORGANIZED HEALTH CARE EDUCATION/TRAINING PROGRAM

## 2024-08-08 PROCEDURE — 99900035 HC TECH TIME PER 15 MIN (STAT)

## 2024-08-08 PROCEDURE — 97110 THERAPEUTIC EXERCISES: CPT | Mod: CQ

## 2024-08-08 PROCEDURE — 20600001 HC STEP DOWN PRIVATE ROOM

## 2024-08-08 PROCEDURE — 83735 ASSAY OF MAGNESIUM: CPT

## 2024-08-08 PROCEDURE — 97535 SELF CARE MNGMENT TRAINING: CPT | Mod: CO

## 2024-08-08 PROCEDURE — 84100 ASSAY OF PHOSPHORUS: CPT

## 2024-08-08 PROCEDURE — 94660 CPAP INITIATION&MGMT: CPT

## 2024-08-08 PROCEDURE — 97530 THERAPEUTIC ACTIVITIES: CPT | Mod: CQ

## 2024-08-08 PROCEDURE — 97530 THERAPEUTIC ACTIVITIES: CPT | Mod: CO

## 2024-08-08 RX ORDER — ALPRAZOLAM 0.25 MG/1
0.25 TABLET ORAL ONCE AS NEEDED
Status: COMPLETED | OUTPATIENT
Start: 2024-08-08 | End: 2024-08-09

## 2024-08-08 RX ORDER — SODIUM,POTASSIUM PHOSPHATES 280-250MG
1 POWDER IN PACKET (EA) ORAL ONCE
Status: COMPLETED | OUTPATIENT
Start: 2024-08-08 | End: 2024-08-08

## 2024-08-08 RX ORDER — SPIRONOLACTONE 50 MG/1
200 TABLET, FILM COATED ORAL DAILY
Status: DISCONTINUED | OUTPATIENT
Start: 2024-08-08 | End: 2024-08-09

## 2024-08-08 RX ORDER — FUROSEMIDE 40 MG/1
80 TABLET ORAL DAILY
Status: DISCONTINUED | OUTPATIENT
Start: 2024-08-08 | End: 2024-08-09

## 2024-08-08 RX ADMIN — METOPROLOL SUCCINATE 100 MG: 100 TABLET, EXTENDED RELEASE ORAL at 09:08

## 2024-08-08 RX ADMIN — SIMETHICONE 80 MG: 80 TABLET, CHEWABLE ORAL at 01:08

## 2024-08-08 RX ADMIN — FUROSEMIDE 80 MG: 40 TABLET ORAL at 01:08

## 2024-08-08 RX ADMIN — Medication 3 MG: at 09:08

## 2024-08-08 RX ADMIN — DULOXETINE HYDROCHLORIDE 60 MG: 30 CAPSULE, DELAYED RELEASE ORAL at 09:08

## 2024-08-08 RX ADMIN — HEPARIN SODIUM 7500 UNITS: 5000 INJECTION INTRAVENOUS; SUBCUTANEOUS at 05:08

## 2024-08-08 RX ADMIN — ACETAMINOPHEN 650 MG: 325 TABLET ORAL at 09:08

## 2024-08-08 RX ADMIN — ONDANSETRON 8 MG: 8 TABLET, ORALLY DISINTEGRATING ORAL at 12:08

## 2024-08-08 RX ADMIN — PANTOPRAZOLE SODIUM 40 MG: 40 TABLET, DELAYED RELEASE ORAL at 09:08

## 2024-08-08 RX ADMIN — ALPRAZOLAM 0.25 MG: 0.25 TABLET ORAL at 09:08

## 2024-08-08 RX ADMIN — HEPARIN SODIUM 7500 UNITS: 5000 INJECTION INTRAVENOUS; SUBCUTANEOUS at 09:08

## 2024-08-08 RX ADMIN — HEPARIN SODIUM 7500 UNITS: 5000 INJECTION INTRAVENOUS; SUBCUTANEOUS at 01:08

## 2024-08-08 RX ADMIN — PROCHLORPERAZINE EDISYLATE 5 MG: 5 INJECTION INTRAMUSCULAR; INTRAVENOUS at 01:08

## 2024-08-08 RX ADMIN — SPIRONOLACTONE 200 MG: 50 TABLET ORAL at 01:08

## 2024-08-08 RX ADMIN — POTASSIUM & SODIUM PHOSPHATES POWDER PACK 280-160-250 MG 1 PACKET: 280-160-250 PACK at 09:08

## 2024-08-08 NOTE — PT/OT/SLP PROGRESS
Physical Therapy Treatment  Co-treatment with OT due to acuity of condition, level of skilled assist needed for assessment of safety with mobility and potential of not tolerating a second treatment session.     Patient Name:  Vicky Alvarado   MRN:  [id number]    Recommendations:     Discharge Recommendations: Moderate Intensity Therapy  Discharge Equipment Recommendations: to be determined by next level of care  Barriers to discharge:  current level of assistance required     Assessment:     Vicky Alvarado is a 59 y.o. female admitted with a medical diagnosis of Anasarca.  She presents with the following impairments/functional limitations: weakness, impaired endurance, impaired self care skills, gait instability, impaired functional mobility, pain, decreased safety awareness, decreased lower extremity function, decreased upper extremity function, impaired cardiopulmonary response to activity Pt tolerated treatment session well today. Pt still requiring assistance for bed mobility, transfers and gait training. Pt was able to tolerate transferring to bedside chair during today's session. Patient remains appropriate for continued skilled services within the acute environment and goals remain appropriate.   .    Rehab Prognosis: Good; patient would benefit from acute skilled PT services to address these deficits and reach maximum level of function.    Recent Surgery: Procedure(s) (LRB):  INSERTION, CATHETER, RIGHT HEART (Right) 3 Days Post-Op    Plan:     During this hospitalization, patient to be seen 4 x/week to address the identified rehab impairments via gait training, therapeutic activities, therapeutic exercises, neuromuscular re-education and progress toward the following goals:    Plan of Care Expires:  08/29/24    Subjective     Chief Complaint: B foot pain   Patient/Family Comments/goals: Pt agreeable to PT   Pain/Comfort:  Pain Rating 1:  (not rated)  Location - Side 1: Bilateral  Location -  Orientation 1: generalized  Location 1: foot  Pain Addressed 1: Reposition, Distraction  Pain Rating Post-Intervention 1:  (not rated)      Objective:     Communicated with RN prior to session.  Patient found supine with telemetry, pulse ox (continuous), PureWick upon PT entry to room.     General Precautions: Standard, fall  Orthopedic Precautions: N/A  Braces: N/A  Respiratory Status: Room air     Functional Mobility:  First session:   Bed Mobility:     Scooting: contact guard assistance  Supine to Sit: minimum assistance  EOB sitting: CGA    Transfers:     Sit to Stand x 2: moderate assistance and of 2 persons (bed) and Sarah x 2 from chair with rolling walker  Bed to Chair: maximal assistance and of 2 persons with  no AD  using  Stand Pivot  Gait: ~ 3 steps forwards modA with RW   Antalgic gait, decreased step length, difficulty advancing LE (R>L), assistance with WS   Further ambulation held due to increased pain   Pt performed 10 repetitions of seated B LE exercises consisting of: Marching, LAQ, ABD/ADD, heel raises, and toe raises.     Second session:   Transfers:   Chair to bed: MaxA no AD via stand pivot   Sit to supine: MaxA x 2       AM-PAC 6 CLICK MOBILITY  Turning over in bed (including adjusting bedclothes, sheets and blankets)?: 3  Sitting down on and standing up from a chair with arms (e.g., wheelchair, bedside commode, etc.): 2  Moving from lying on back to sitting on the side of the bed?: 3  Moving to and from a bed to a chair (including a wheelchair)?: 2  Need to walk in hospital room?: 1  Climbing 3-5 steps with a railing?: 1  Basic Mobility Total Score: 12       Treatment & Education:  Therapist provided instruction and educated for safety during bed mobility, transfers, gait training. As well as proper body mechanics, energy conservation, and fall prevention strategies during tasks listed above, and the effects of prolonged immobility and the importance of performing EOB/OOB activity and  exercises to promote healing and reduce recovery time.       Patient left supine with all lines intact, call button in reach, and RN notified..    GOALS:   Multidisciplinary Problems       Physical Therapy Goals          Problem: Physical Therapy    Goal Priority Disciplines Outcome Goal Variances Interventions   Physical Therapy Goal     PT, PT/OT Progressing     Description: Goals to be met by: 2024     Patient will increase functional independence with mobility by performin. Supine to sit with Stand-by Assistance  2. Sit to supine with Stand-by Assistance  3. Sit to stand transfer with Supervision  4. Bed to chair transfer with Supervision using LRAD  5. Gait  x 100 feet with Contact Guard Assistance using LRAD.   6. Ascend/descend 1 stair with no Handrails Contact Guard Assistance using LRAD.     The mobility limitation cannot be sufficiently resolved by the use of a cane.   Patient's functional mobility deficit can be sufficiently resolved with the use of a (bariatric Rolling Walker or Walker).  Patient's mobility limitation significantly impairs their ability to participate in one of more activities of daily living.  The use of a (bariatric RW or Walker) will significantly improve the patient's ability to participate in MRADLS and the patient will use it on regular basis in the home.                           Time Tracking:     PT Received On: 24  PT Start Time: 1028     PT Stop Time: 1106 (Second session: (13:08-13:20= 12 minutes))  PT Total Time (min): 38 min +12 minutes = 50 total minutes   3 billable units     Billable Minutes: Gait Training 10, Therapeutic Activity 20, and Therapeutic Exercise 20    Treatment Type: Treatment  PT/PTA: PTA     Number of PTA visits since last PT visit: 1     2024

## 2024-08-08 NOTE — TREATMENT PLAN
"Hepatology Treatment Plan    Vicky Alvarado is a 59 y.o. female admitted to hospital 7/26/2024 (Hospital Day: 14) due to Anasarca.     Interval History  NAEON. Self-reports good UOP. Diuretics not yet re-started.     Objective  Temp:  [97.8 °F (36.6 °C)-98.2 °F (36.8 °C)] 97.8 °F (36.6 °C) (08/08 1128)  Pulse:  [61-82] 61 (08/08 1129)  BP: ()/(52-69) 109/60 (08/08 1129)  Resp:  [12-18] 18 (08/08 1128)  SpO2:  [88 %-95 %] 95 % (08/08 1128)    Laboratory    No results for input(s): "WBC", "RBC", "HGB", "HCT", "PLT", "MCV", "MCH", "MCHC" in the last 24 hours.     Recent Labs   Lab 08/08/24  0337   CALCIUM 9.3   ALBUMIN 2.5*   PROT 5.9*   *   K 3.6   CO2 32*   CL 93*   BUN 29*   CREATININE 1.2   ALKPHOS 162*   ALT <5*   AST 24   BILITOT 0.8        MELD 3.0: 12 at 7/29/2024  2:27 PM  MELD-Na: 9 at 7/29/2024  2:27 PM  Calculated from:  Serum Creatinine: 1.2 mg/dL at 7/29/2024  2:27 PM  Serum Sodium: 141 mmol/L (Using max of 137 mmol/L) at 7/29/2024  2:27 PM  Total Bilirubin: 0.6 mg/dL (Using min of 1 mg/dL) at 7/28/2024  5:10 AM  Serum Albumin: 2.4 g/dL at 7/29/2024  2:27 PM  INR(ratio): 1.1 at 7/27/2024  4:35 AM  Age at listing (hypothetical): 59 years  Sex: Female at 7/29/2024  2:27 PM         Assessment and Plan    59-year-old female with medical history of 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 S/P RHC with normal BV pressures. Liver Bx with normal wedge pressures although pathology showed 4/4 fibrosis with histopathologic support for congestive hepatopathy as well. Cardiac contribution remains unclear but will attempt to aggressively manage ascites and optimize volume status. TIPS should be a consideration as an outpatient     Problem List:  Volume overload  2.   Recurrent ascites  3.   " Histopathologic cirrhosis  4.   Histopathologic signs of congestive hepatopathy  5.   History of diastolic heart failure    Recommendations:    - Aggressive ascites management and volume optimization  - START Lasix PO 80 mg QD + Spironolactone 200 mg QD  - Daily electrolytes and strict ins/out  - Low Na diet and fluid restriction  - Obtain MRI abdomen and pelvis with contrast (malignancy consideration)  - Organize weekly OP paracentesis  - Obtain weekly CBC and CMP  - OP Hepatology Follow up for Transplant evaluation and TIPS consideration      - We are signing-off. Please call with any questions.    Plan of care discussed with attending Dr. Sharif. Thank you for involving us in the care of Vicky Alvarado. Please call with any additional questions, concerns or changes in the patient's clinical status.      Rosario Diehl MD  Hepatology  [facility] ([place]-14)

## 2024-08-08 NOTE — SUBJECTIVE & OBJECTIVE
Interval History: Ms. Alvarado states that the site around her paracentesis is no longer sore. She does report severe crampy LLQ abdominal pain that onsets shortly after eating.  She mentioned that it might be gas, she was informed that Gas-X is available upon request. She reports only having small stools with no melena or hematochezia. MRI will be done outpatient.     Review of Systems   Constitutional:  Negative for chills and diaphoresis.   Respiratory:  Positive for shortness of breath. Negative for cough.         SOB still present but improved; down to 2L   Cardiovascular:  Negative for chest pain, palpitations and leg swelling.   Gastrointestinal:  Positive for abdominal distention and abdominal pain. Negative for blood in stool, nausea and vomiting.        Crampy LLQ pain; onsets shortly after eating.     Patient states her abdomen feels much better since last paracentesis.        Genitourinary:  Negative for difficulty urinating, dysuria and hematuria.   Neurological:  Negative for dizziness and headaches.     Objective:     Vital Signs (Most Recent):  Temp: 98 °F (36.7 °C) (08/08/24 0725)  Pulse: 65 (08/08/24 0725)  Resp: 18 (08/08/24 0725)  BP: 109/69 (08/08/24 0725)  SpO2: (!) 91 % (08/08/24 0725) Vital Signs (24h Range):  Temp:  [97.5 °F (36.4 °C)-98.2 °F (36.8 °C)] 98 °F (36.7 °C)  Pulse:  [62-75] 65  Resp:  [12-18] 18  SpO2:  [88 %-93 %] 91 %  BP: ()/(52-69) 109/69     Weight: 99.2 kg (218 lb 11.1 oz)  Body mass index is 36.39 kg/m².    Intake/Output Summary (Last 24 hours) at 8/8/2024 0826  Last data filed at 8/7/2024 2021  Gross per 24 hour   Intake 222 ml   Output 215 ml   Net 7 ml         Physical Exam  Constitutional:       General: She is not in acute distress.     Appearance: She is obese. She is not ill-appearing.   HENT:      Head: Normocephalic and atraumatic.   Eyes:      General: No scleral icterus.  Cardiovascular:      Rate and Rhythm: Normal rate and regular rhythm.      Heart  sounds: Murmur heard.   Pulmonary:      Effort: Pulmonary effort is normal.      Breath sounds: Normal breath sounds.      Comments: Patient on 2 L nasal canula  Abdominal:      General: Bowel sounds are normal. There is no distension.      Tenderness: There is abdominal tenderness. There is no guarding or rebound.      Comments: Mild tenderness in LLQ    Abdominal swelling is much improved since last paracentesis.        Musculoskeletal:      Right lower leg: No edema.      Left lower leg: No edema.      Comments: Edema improved since admission. Slight pitting edema.  Patient not wearing compression stockings.    Skin:     General: Skin is warm and dry.      Coloration: Skin is not jaundiced.   Neurological:      Mental Status: She is alert and oriented to person, place, and time.   Psychiatric:         Mood and Affect: Mood normal.         Behavior: Behavior normal.             Significant Labs: All pertinent labs within the past 24 hours have been reviewed.    Significant Imaging: I have reviewed all pertinent imaging results/findings within the past 24 hours.

## 2024-08-08 NOTE — PLAN OF CARE
Problem: Adult Inpatient Plan of Care  Goal: Plan of Care Review  Outcome: Progressing   During my shift, pt AAOx4, NAD. VS stable, pt remains afebrile. SPO2 wnl on room air and CPAP HS. Pt reports abd cramping and requested Tylenol. Pt up with max assistance x4 people to bedside commode. 24 hr urine collection still in progress. Pt remains free from falls or injury. Avasys camera at bedside for additional safey. Bed is lowered and locked. Call light is within reach. Pt has no known needs at this time.

## 2024-08-08 NOTE — PROGRESS NOTES
VSS. Complaints of abdominal gas discomfort and nausea after meals PRN antiemetic and simethicone given as ordered; abdominal MRI pending. Patient resting at this time with needs met and safety measures in place.   08/08/24 0925   Pain/Comfort/Sleep   Pain Rating (0-10): Rest 0   Cognitive   Cognitive/Neuro/Behavioral WDL WDL   Level of Consciousness (AVPU) alert   Orientation oriented x 4   Mood/Behavior calm;cooperative   Joon Coma Scale   Best Eye Response 4-->(E4) spontaneous   Best Motor Response 6-->(M6) obeys commands   Best Verbal Response 5-->(V5) oriented   Jadwin Coma Scale Score 15   HEENT   HEENT WDL ex;vision aid   Vision Aid glasses at bedside   Respiratory   Respiratory WDL WDL   Rhythm/Pattern, Respiratory unlabored;pattern regular;depth regular;chest wiggle adequate;no shortness of breath reported   Expansion/Accessory Muscles/Retractions no use of accessory muscles;no retractions   Breath Sounds   All Lung Fields Breath Sounds Anterior:;diminished   Cardiac   Cardiac WDL WDL   Cardiac Rhythm radial pulse regular;apical pulse regular   Peripheral Neurovascular   Peripheral Neurovascular WDL ex;edema   VTE Required Core Measure Pharmacological prophylaxis initiated/maintained   VTE Prevention/Management bleeding precations maintained;bleeding risk assessed   Pulse Carotid   Left Carotid Pulse 2+ (normal)   Right Carotid Pulse 2+ (normal)   Pulse Radial   Left Radial Pulse 2+ (normal)   Right Radial Pulse 2+ (normal)   Pulse Popliteal   Left Popliteal Pulse 2+ (normal)   Right Popliteal Pulse 2+ (normal)   Pulse Dorsalis Pedis   Left Dorsalis Pedis Pulse 2+ (normal)   Right Dorsalis Pedis Pulse 2+ (normal)        Peripheral IV - Single Lumen 08/01/24 1053 20 G Distal;Left;Posterior Forearm   Placement Date/Time: 08/01/24 1053   Present Prior to Hospital Arrival?: Yes  Inserted by: RN  Size (G): 20 G  Orientation: Distal;Left;Posterior  Location: Forearm   Site Assessment Clean;Dry;No redness;No  swelling;Intact;No warmth;No drainage   Extremity Assessment Distal to IV No warmth;No swelling;No redness;No abnormal discoloration   Line Status Saline locked   Dressing Status Clean;Dry;Intact   Dressing Intervention Integrity maintained        Peripheral IV - Single Lumen 08/01/24 1105 20 G 1 3/4 in Anterior;Left Upper Arm   Placement Date/Time: 08/01/24 1105   Present Prior to Hospital Arrival?: No  Inserted by: RN  Size (G): 20 G  Length (in): 1 3/4 in  Orientation: Anterior;Left  Location: Upper Arm  Placement directed by: Ultrasound  Site Prep: Alcohol;Chlorhexidine  ...   Site Assessment Clean;Dry;No redness;Intact;No swelling;No warmth;No drainage   Extremity Assessment Distal to IV No warmth;No swelling;No redness;No abnormal discoloration   Line Status Saline locked   Dressing Status Clean;Dry;Intact   Dressing Intervention Integrity maintained   Skin   Skin WDL ex;characteristics   Skin Color/Characteristics pale   Skin Temperature warm   Skin Moisture dry   Skin Elasticity quick return to original state   Skin Integrity excoriation   Specialty Bed/Overlay Overlay, nonpowered/static (waffle)   Bed Support Surface Assessed   Kodak Risk Assessment   Sensory Perception 3-->slightly limited   Moisture 3-->occasionally moist   Activity 1-->bedfast   Mobility 2-->very limited   Nutrition 3-->adequate   Friction and Shear 2-->potential problem   Kodak Score 14        Wound 07/30/24 1000 Puncture Right lateral Lower quadrant   Date First Assessed/Time First Assessed: 07/30/24 1000   Primary Wound Type: (c) Puncture  Side: Right  Orientation: lateral  Location: Lower quadrant   Dressing Appearance Dry;Intact   Drainage Amount None   Appearance Dressing in place, unable to visualize   Musculoskeletal   Musculoskeletal WDL ex;mobility   General Mobility generalized weakness   Gastrointestinal   GI WDL ex;GI symptoms   Abdominal Appearance rounded;distended   Last Bowel Movement 08/07/24   Genitourinary    Genitourinary WDL WDL   Voiding Characteristics voids spontaneously without difficulty   Female External Urinary Catheter w/ Suction 07/27/24 0035   Placement Date/Time: 07/27/24 0035   Present Prior to Hospital Arrival?: No  Inserted by: RN   Skin no redness;no breakdown   Tolerance no signs/symptoms of discomfort   Suction Continuous suction at 40 mmHg   Date of last wick change 08/08/24   Time of last wick change 0925   Coping/Psychosocial   Observed Emotional State cooperative   Verbalized Emotional State anxiety   Plan of Care Reviewed With patient   Safety   Safety WDL WD   Safety Factors ID band on;upper side rails raised x 2;call light in reach;wheels locked;bed in low position   All Alarms alarm(s) activated and audible   Fall Risk Assessment (every shift)   History Of Fall (W/I 3 Mos) 4-->Yes   Polypharmacy 3-->Yes   Central Nervous System/Psychotropic Medication 3-->Yes   Cardiovascular Medication 3-->Yes   Age Greater Than 65 Years 0-->No   Altered Elimination 2-->Yes   Cognitive Deficit 0-->No   Sensory Deficit 2-->Yes   Dizziness/Vertigo 0-->No   Depression 0-->No   Mobility Deficit/Weakness 2-->Yes   Male 0-->No   Fall Risk Score 19   ABC Risk for Fall with Injury Assessment   A= Age: Is the patient greater than or equal to 85 years old or frail due to clinical condition? No   B=Bones: Does the patient have osteoporosis, previous fracture, prolonged steroid use, or metastatic bone cancer? No   C=Coagulation Disorders: Does the patient have a bleeding disorder, either through anticoagulants or underlying clinical condition? No   S=recent Surgery: Is the patient post-op surgicalwith a recent lower limb amputation or recent major abdominal or thoracic surgery? No   Safety Management   Safety Promotion/Fall Prevention bed alarm set;lighting adjusted;medications reviewed;nonskid shoes/socks when out of bed   Medication Review/Management medications reviewed   Patient Rounds call light in patient/parent  reach;clutter free environment maintained;ID band on;placement of personal items at bedside;toileting offered;bed in low position;bed wheels locked;visualized patient   Safety Bands on Patient Allergy Band   Daily Care   Activity Management Rolling - L1   Activity Assistance Provided assistance, 1 person   Mobility   GEMS ([place] Early Mobility Scale) Level 2-Edge of bed activities with assist   Positioning   Body Position turned   Head of Bed (HOB) Positioning HOB at 15 degrees   Positioning/Transfer Devices pillows;in use

## 2024-08-08 NOTE — PLAN OF CARE
Problem: Adult Inpatient Plan of Care  Goal: Plan of Care Review  Outcome: Progressing  Goal: Patient-Specific Goal (Individualized)  Outcome: Progressing  Goal: Absence of Hospital-Acquired Illness or Injury  Outcome: Progressing  Goal: Optimal Comfort and Wellbeing  Outcome: Progressing  Goal: Readiness for Transition of Care  Outcome: Progressing     Problem: Bariatric Environmental Safety  Goal: Safety Maintained with Care  Outcome: Progressing     Problem: Diabetes Comorbidity  Goal: Blood Glucose Level Within Targeted Range  Outcome: Progressing     Problem: Skin Injury Risk Increased  Goal: Skin Health and Integrity  Outcome: Progressing     Problem: Wound  Goal: Optimal Coping  Outcome: Progressing  Goal: Optimal Functional Ability  Outcome: Progressing  Goal: Absence of Infection Signs and Symptoms  Outcome: Progressing  Goal: Improved Oral Intake  Outcome: Progressing  Goal: Optimal Pain Control and Function  Outcome: Progressing  Goal: Skin Health and Integrity  Outcome: Progressing  Goal: Optimal Wound Healing  Outcome: Progressing     Problem: Diarrhea  Goal: Effective Diarrhea Management  Outcome: Progressing     Problem: Liver Failure  Goal: Optimal Coping with Liver Failure  Outcome: Progressing  Goal: Fluid and Electrolyte Balance  Outcome: Progressing  Goal: Optimal Gastrointestinal Function  Outcome: Progressing  Goal: Blood Glucose Level Within Target Range  Outcome: Progressing  Goal: Optimal Coagulation Function  Outcome: Progressing  Goal: Absence of Infection Signs and Symptoms  Outcome: Progressing  Goal: Optimal Neurologic Function  Outcome: Progressing  Goal: Improved Oral Intake  Outcome: Progressing  Goal: Optimal Pain Control, Comfort and Function  Outcome: Progressing  Goal: Optimize Renal Function  Outcome: Progressing  Goal: Effective Oxygenation and Ventilation  Outcome: Progressing

## 2024-08-08 NOTE — PT/OT/SLP PROGRESS
Occupational Therapy   Treatment    Name: Vicky Alvarado  MRN: [id number]  Admitting Diagnosis:  Anasarca  3 Days Post-Op    Recommendations:     Discharge Recommendations: Moderate Intensity Therapy  Discharge Equipment Recommendations:  to be determined by next level of care  Barriers to discharge:  Other (Comment), Decreased caregiver support (patient requires skilled level of assistance)    Assessment:     Vicky Alvarado is a 59 y.o. female with a medical diagnosis of Anasarca.  She presents with the fallowing performance deficits affecting function are weakness, impaired endurance, impaired self care skills, impaired functional mobility, gait instability, impaired balance, pain, impaired cardiopulmonary response to activity, decreased safety awareness, decreased lower extremity function, decreased upper extremity function. Patient agreeable to tx session, patient continues to require significant assistance with functional transfers, bed mobility, and self-care task and continues to have limited activity tolerance due to pain and weakness from recent surgical procedure. Patient would benefit from Moderate intensity therapy intervention to address over all functional decline with mobility task, endurance, and ADLs in order to return to PLOF.     Rehab Prognosis:  Good; patient would benefit from acute skilled OT services to address these deficits and reach maximum level of function.       Plan:     Patient to be seen 4 x/week to address the above listed problems via self-care/home management, therapeutic activities, therapeutic exercises  Plan of Care Expires: 08/29/24  Plan of Care Reviewed with: patient    Subjective     Chief Complaint: pain and dizziness   Patient/Family Comments/goals: to return to PLOF  Pain/Comfort:  Pain Rating 1:  (patient did not rate)  Location - Side 1: Bilateral  Location - Orientation 1: generalized  Location 1: foot  Pain Addressed 1: Reposition, Distraction  Pain  Rating Post-Intervention 1:  (patient did not rate)    Objective:     Communicated with: Nurse prior to session.  Patient found HOB elevated with telemetry, pulse ox (continuous), PureWick upon OT entry to room.  A client care conference was completed by the OTR and the CHRISTIAN prior to treatment by the CHRISTIAN to discuss the patient's POC and current status.   Co-treated with PT due to patient complexity deficits requiring two skilled therapist to appropriately and safely mobilized patient while facilitating functional task in addition to accommodating for patient's activity tolerance.    General Precautions: Standard, fall    Orthopedic Precautions:N/A  Braces: N/A  Respiratory Status: Room air     Occupational Performance:     Bed Mobility:    Patient completed Rolling/Turning to Left with  contact guard assistance using bed rail   Patient completed Rolling/Turning to Right with contact guard assistance  Patient completed Scooting anteriorly to EOB with contact guard assistance and required Max of 2 persons to scoot to HOB   Patient completed Supine to Sit with minimum assistance  Patient completed Sit to Supine with maximal assistance of 2 persons to manage trunk and legs     Functional Mobility/Transfers:  Patient completed Sit <> Stand Transfer from EOB x2 trials with moderate assistance of 2 persons  with  rolling walker   Patient completed Sit<>Stand Transfer from bedside chair with Min A of 2 persons   Patient completed bed<>chair transfer with max assistance of 2 persons using stand pivot and required verbal cues for proper sequencing of task   Functional Mobility: Patient was able to take 3 steps with Mod A with RW and chair fallow for safety.   Patient exhibits mild instability due to posterior lean and also was difficulty with foot clearance.     Activities of Daily Living:  Grooming: Setup A to complete oral care task sitting up in chair   Upper Body Dressing: minimum assistance to don/doff gown sitting up  in chair   Lower Body Dressing: maximal assistance to don/doff socks       [facility] 6 Click ADL: 18    Treatment & Education:  Discussed OT POC and progress  Educated patient on the importance to continue to perform exercises in order to reduce stiffness and promote joint mobility and blood flow  Addressed patient's questions and concerns within CHRISTIAN scope of practice      Patient left HOB elevated with all lines intact and call button in reach    GOALS:   Multidisciplinary Problems       Occupational Therapy Goals          Problem: Occupational Therapy    Goal Priority Disciplines Outcome Interventions   Occupational Therapy Goal     OT, PT/OT Progressing    Description: Goals to be met by: 8/5/2024     Patient will increase functional independence with ADLs by performing:    UE Dressing with Set-up Assistance.  LE Dressing with Minimal Assistance.  Grooming while standing at sink with Supervision.  Toileting from toilet with Supervision for hygiene and clothing management.   Toilet transfer to toilet with Supervision.                         Time Tracking:     OT Date of Treatment: 08/08/24  OT Start Time: 1029  2nd time: 1308  OT Stop Time: 1107   2nd time: 1322  OT Total Time (min): 38 min+14=52 mins 3 units     Billable Minutes:Self Care/Home Management 30  Therapeutic Activity 22    OT/GASPER: GASPER     Number of GASPER visits since last OT visit: 1    8/8/2024

## 2024-08-09 LAB
ALBUMIN SERPL BCP-MCNC: 2.5 G/DL (ref 3.5–5.2)
ALBUMIN SERPL BCP-MCNC: 2.7 G/DL (ref 3.5–5.2)
ALP SERPL-CCNC: 184 U/L (ref 55–135)
ALT SERPL W/O P-5'-P-CCNC: 6 U/L (ref 10–44)
ANION GAP SERPL CALC-SCNC: 11 MMOL/L (ref 8–16)
ANION GAP SERPL CALC-SCNC: 13 MMOL/L (ref 8–16)
AST SERPL-CCNC: 26 U/L (ref 10–40)
BASOPHILS # BLD AUTO: 0.06 K/UL (ref 0–0.2)
BASOPHILS NFR BLD: 0.7 % (ref 0–1.9)
BILIRUB SERPL-MCNC: 0.8 MG/DL (ref 0.1–1)
BUN SERPL-MCNC: 30 MG/DL (ref 6–20)
BUN SERPL-MCNC: 31 MG/DL (ref 6–20)
CALCIUM SERPL-MCNC: 10.1 MG/DL (ref 8.7–10.5)
CALCIUM SERPL-MCNC: 9.7 MG/DL (ref 8.7–10.5)
CHLORIDE SERPL-SCNC: 91 MMOL/L (ref 95–110)
CHLORIDE SERPL-SCNC: 92 MMOL/L (ref 95–110)
CO2 SERPL-SCNC: 30 MMOL/L (ref 23–29)
CO2 SERPL-SCNC: 32 MMOL/L (ref 23–29)
CREAT SERPL-MCNC: 1.5 MG/DL (ref 0.5–1.4)
CREAT SERPL-MCNC: 1.5 MG/DL (ref 0.5–1.4)
DIFFERENTIAL METHOD BLD: ABNORMAL
EOSINOPHIL # BLD AUTO: 0.4 K/UL (ref 0–0.5)
EOSINOPHIL NFR BLD: 4.4 % (ref 0–8)
ERYTHROCYTE [DISTWIDTH] IN BLOOD BY AUTOMATED COUNT: 14.4 % (ref 11.5–14.5)
EST. GFR  (NO RACE VARIABLE): 39.6 ML/MIN/1.73 M^2
EST. GFR  (NO RACE VARIABLE): 39.6 ML/MIN/1.73 M^2
GLUCOSE SERPL-MCNC: 100 MG/DL (ref 70–110)
GLUCOSE SERPL-MCNC: 102 MG/DL (ref 70–110)
HCT VFR BLD AUTO: 37 % (ref 37–48.5)
HGB BLD-MCNC: 11.3 G/DL (ref 12–16)
IMM GRANULOCYTES # BLD AUTO: 0.16 K/UL (ref 0–0.04)
IMM GRANULOCYTES NFR BLD AUTO: 1.9 % (ref 0–0.5)
LYMPHOCYTES # BLD AUTO: 1 K/UL (ref 1–4.8)
LYMPHOCYTES NFR BLD: 12 % (ref 18–48)
MAGNESIUM SERPL-MCNC: 1.8 MG/DL (ref 1.6–2.6)
MCH RBC QN AUTO: 27.5 PG (ref 27–31)
MCHC RBC AUTO-ENTMCNC: 30.5 G/DL (ref 32–36)
MCV RBC AUTO: 90 FL (ref 82–98)
MONOCYTES # BLD AUTO: 1.2 K/UL (ref 0.3–1)
MONOCYTES NFR BLD: 14 % (ref 4–15)
NEUTROPHILS # BLD AUTO: 5.6 K/UL (ref 1.8–7.7)
NEUTROPHILS NFR BLD: 67 % (ref 38–73)
NRBC BLD-RTO: 0 /100 WBC
OHS QRS DURATION: 128 MS
OHS QTC CALCULATION: 519 MS
PHOSPHATE SERPL-MCNC: 3.4 MG/DL (ref 2.7–4.5)
PHOSPHATE SERPL-MCNC: 3.7 MG/DL (ref 2.7–4.5)
PLATELET # BLD AUTO: 277 K/UL (ref 150–450)
PMV BLD AUTO: 10.5 FL (ref 9.2–12.9)
POCT GLUCOSE: 107 MG/DL (ref 70–110)
POTASSIUM SERPL-SCNC: 3.8 MMOL/L (ref 3.5–5.1)
POTASSIUM SERPL-SCNC: 4 MMOL/L (ref 3.5–5.1)
PROT SERPL-MCNC: 6.5 G/DL (ref 6–8.4)
RBC # BLD AUTO: 4.11 M/UL (ref 4–5.4)
SODIUM SERPL-SCNC: 134 MMOL/L (ref 136–145)
SODIUM SERPL-SCNC: 135 MMOL/L (ref 136–145)
WBC # BLD AUTO: 8.41 K/UL (ref 3.9–12.7)

## 2024-08-09 PROCEDURE — 93005 ELECTROCARDIOGRAM TRACING: CPT

## 2024-08-09 PROCEDURE — 30200315 PPD INTRADERMAL TEST REV CODE 302: Performed by: STUDENT IN AN ORGANIZED HEALTH CARE EDUCATION/TRAINING PROGRAM

## 2024-08-09 PROCEDURE — 25500020 PHARM REV CODE 255: Performed by: STUDENT IN AN ORGANIZED HEALTH CARE EDUCATION/TRAINING PROGRAM

## 2024-08-09 PROCEDURE — 25000003 PHARM REV CODE 250

## 2024-08-09 PROCEDURE — 85025 COMPLETE CBC W/AUTO DIFF WBC: CPT

## 2024-08-09 PROCEDURE — 63600175 PHARM REV CODE 636 W HCPCS: Performed by: STUDENT IN AN ORGANIZED HEALTH CARE EDUCATION/TRAINING PROGRAM

## 2024-08-09 PROCEDURE — 94761 N-INVAS EAR/PLS OXIMETRY MLT: CPT

## 2024-08-09 PROCEDURE — 93010 ELECTROCARDIOGRAM REPORT: CPT | Mod: ,,, | Performed by: STUDENT IN AN ORGANIZED HEALTH CARE EDUCATION/TRAINING PROGRAM

## 2024-08-09 PROCEDURE — 36415 COLL VENOUS BLD VENIPUNCTURE: CPT

## 2024-08-09 PROCEDURE — 80053 COMPREHEN METABOLIC PANEL: CPT | Performed by: STUDENT IN AN ORGANIZED HEALTH CARE EDUCATION/TRAINING PROGRAM

## 2024-08-09 PROCEDURE — 94660 CPAP INITIATION&MGMT: CPT

## 2024-08-09 PROCEDURE — 27100171 HC OXYGEN HIGH FLOW UP TO 24 HOURS

## 2024-08-09 PROCEDURE — 63600175 PHARM REV CODE 636 W HCPCS

## 2024-08-09 PROCEDURE — 97530 THERAPEUTIC ACTIVITIES: CPT | Mod: CO

## 2024-08-09 PROCEDURE — 97530 THERAPEUTIC ACTIVITIES: CPT | Mod: CQ

## 2024-08-09 PROCEDURE — 97112 NEUROMUSCULAR REEDUCATION: CPT | Mod: CQ

## 2024-08-09 PROCEDURE — 86580 TB INTRADERMAL TEST: CPT | Performed by: STUDENT IN AN ORGANIZED HEALTH CARE EDUCATION/TRAINING PROGRAM

## 2024-08-09 PROCEDURE — 80069 RENAL FUNCTION PANEL: CPT

## 2024-08-09 PROCEDURE — 97110 THERAPEUTIC EXERCISES: CPT | Mod: CQ

## 2024-08-09 PROCEDURE — 97535 SELF CARE MNGMENT TRAINING: CPT | Mod: CO

## 2024-08-09 PROCEDURE — 20600001 HC STEP DOWN PRIVATE ROOM

## 2024-08-09 PROCEDURE — 99900035 HC TECH TIME PER 15 MIN (STAT)

## 2024-08-09 PROCEDURE — 84100 ASSAY OF PHOSPHORUS: CPT

## 2024-08-09 PROCEDURE — 83735 ASSAY OF MAGNESIUM: CPT

## 2024-08-09 PROCEDURE — A9585 GADOBUTROL INJECTION: HCPCS | Performed by: STUDENT IN AN ORGANIZED HEALTH CARE EDUCATION/TRAINING PROGRAM

## 2024-08-09 RX ORDER — GADOBUTROL 604.72 MG/ML
9 INJECTION INTRAVENOUS
Status: COMPLETED | OUTPATIENT
Start: 2024-08-09 | End: 2024-08-09

## 2024-08-09 RX ORDER — LACTULOSE 10 G/15ML
10 SOLUTION ORAL 2 TIMES DAILY
Status: DISCONTINUED | OUTPATIENT
Start: 2024-08-09 | End: 2024-08-13 | Stop reason: HOSPADM

## 2024-08-09 RX ORDER — HEPARIN SODIUM 5000 [USP'U]/ML
5000 INJECTION, SOLUTION INTRAVENOUS; SUBCUTANEOUS EVERY 12 HOURS
Status: DISCONTINUED | OUTPATIENT
Start: 2024-08-09 | End: 2024-08-10

## 2024-08-09 RX ORDER — ALPRAZOLAM 0.25 MG/1
0.25 TABLET ORAL 2 TIMES DAILY PRN
Status: DISCONTINUED | OUTPATIENT
Start: 2024-08-09 | End: 2024-08-13 | Stop reason: HOSPADM

## 2024-08-09 RX ORDER — SIMETHICONE 80 MG
1 TABLET,CHEWABLE ORAL 2 TIMES DAILY
Status: DISCONTINUED | OUTPATIENT
Start: 2024-08-09 | End: 2024-08-13 | Stop reason: HOSPADM

## 2024-08-09 RX ADMIN — SODIUM CHLORIDE, POTASSIUM CHLORIDE, SODIUM LACTATE AND CALCIUM CHLORIDE 250 ML: 600; 310; 30; 20 INJECTION, SOLUTION INTRAVENOUS at 08:08

## 2024-08-09 RX ADMIN — PANTOPRAZOLE SODIUM 40 MG: 40 TABLET, DELAYED RELEASE ORAL at 11:08

## 2024-08-09 RX ADMIN — ACETAMINOPHEN 650 MG: 325 TABLET ORAL at 05:08

## 2024-08-09 RX ADMIN — HEPARIN SODIUM 5000 UNITS: 5000 INJECTION INTRAVENOUS; SUBCUTANEOUS at 09:08

## 2024-08-09 RX ADMIN — LACTULOSE 10 G: 20 SOLUTION ORAL at 01:08

## 2024-08-09 RX ADMIN — TUBERCULIN PURIFIED PROTEIN DERIVATIVE 5 UNITS: 5 INJECTION, SOLUTION INTRADERMAL at 11:08

## 2024-08-09 RX ADMIN — SODIUM CHLORIDE, POTASSIUM CHLORIDE, SODIUM LACTATE AND CALCIUM CHLORIDE 500 ML: 600; 310; 30; 20 INJECTION, SOLUTION INTRAVENOUS at 04:08

## 2024-08-09 RX ADMIN — DULOXETINE HYDROCHLORIDE 60 MG: 30 CAPSULE, DELAYED RELEASE ORAL at 11:08

## 2024-08-09 RX ADMIN — ALPRAZOLAM 0.25 MG: 0.25 TABLET ORAL at 02:08

## 2024-08-09 RX ADMIN — ALPRAZOLAM 0.25 MG: 0.25 TABLET ORAL at 01:08

## 2024-08-09 RX ADMIN — Medication 3 MG: at 09:08

## 2024-08-09 RX ADMIN — SIMETHICONE 80 MG: 80 TABLET, CHEWABLE ORAL at 11:08

## 2024-08-09 RX ADMIN — ONDANSETRON 8 MG: 8 TABLET, ORALLY DISINTEGRATING ORAL at 08:08

## 2024-08-09 RX ADMIN — ONDANSETRON 8 MG: 8 TABLET, ORALLY DISINTEGRATING ORAL at 05:08

## 2024-08-09 RX ADMIN — SIMETHICONE 80 MG: 80 TABLET, CHEWABLE ORAL at 09:08

## 2024-08-09 RX ADMIN — LACTULOSE 10 G: 20 SOLUTION ORAL at 09:08

## 2024-08-09 RX ADMIN — GADOBUTROL 9 ML: 604.72 INJECTION INTRAVENOUS at 03:08

## 2024-08-09 RX ADMIN — HEPARIN SODIUM 7500 UNITS: 5000 INJECTION INTRAVENOUS; SUBCUTANEOUS at 05:08

## 2024-08-09 NOTE — PLAN OF CARE
Discharge Plan A and Plan B have been determined by review of patient's clinical status, future medical and therapeutic needs, and coverage/benefits for post-acute care in coordination with multidisciplinary team members.    08/08/24 1001   Discharge Reassessment   Assessment Type Discharge Planning Reassessment   Did the patient's condition or plan change since previous assessment? No   Discharge Plan discussed with: Patient;Sibling   Name(s) and Number(s) Zully Arita (Sister)  [phone] (Mobile)   Communicated RAYO with patient/caregiver Yes   Discharge Plan A Skilled Nursing Facility   DME Needed Upon Discharge  wheelchair  (bariatric wheelchair)   Transition of Care Barriers Mobility   Post-Acute Status   Post-Acute Authorization Placement   Post-Acute Placement Status Pending payor review/awaiting authorization (if required)   Hospital Resources/Appts/Education Provided Appointments scheduled and added to AVS   Patient choice form signed by patient/caregiver List from CMS Compare;List from System Post-Acute Care;List with quality metrics by geographic area provided   Discharge Delays None known at this time     0830 am  CM received a message from Rosalina Xiong @ [facility] [phone] and will not be able to accommodate the patient needs.    0930 am  CM met with patient and patients sister (see contact info above)   to discuss discharge planning.  Updated both parties that [facility] has declined and only accepting facility is [facility]. Both patients are in agreement with pursuing [facility] [phone]  Patients plan is to   dc to SNF.   RAYO:  8/9/24    1005 am  FATIMAH spoke with Elinor Elmore at [facility] and auth was submitted        Debra Ga RN  Case Management  [facility]  [phone]

## 2024-08-09 NOTE — PLAN OF CARE
Ascites, MRI abd/pelvic done, premed with xanax 0.25mg PO. Results pending. 24hr urine collection in progress. 1200cc/day fluid restriction. CPAP @ HS . VS stable. No acute events overnight.     Problem: Adult Inpatient Plan of Care  Goal: Plan of Care Review  Outcome: Progressing  Goal: Patient-Specific Goal (Individualized)  Outcome: Progressing  Goal: Absence of Hospital-Acquired Illness or Injury  Outcome: Progressing  Goal: Optimal Comfort and Wellbeing  Outcome: Progressing  Goal: Readiness for Transition of Care  Outcome: Progressing     Problem: Bariatric Environmental Safety  Goal: Safety Maintained with Care  Outcome: Progressing     Problem: Diabetes Comorbidity  Goal: Blood Glucose Level Within Targeted Range  Outcome: Progressing     Problem: Skin Injury Risk Increased  Goal: Skin Health and Integrity  Outcome: Progressing     Problem: Wound  Goal: Optimal Coping  Outcome: Progressing  Goal: Optimal Functional Ability  Outcome: Progressing  Goal: Absence of Infection Signs and Symptoms  Outcome: Progressing  Goal: Improved Oral Intake  Outcome: Progressing  Goal: Optimal Pain Control and Function  Outcome: Progressing  Goal: Skin Health and Integrity  Outcome: Progressing  Goal: Optimal Wound Healing  Outcome: Progressing     Problem: Pain Acute  Goal: Optimal Pain Control and Function  Outcome: Progressing     Problem: Liver Failure  Goal: Optimal Coping with Liver Failure  Outcome: Progressing  Goal: Fluid and Electrolyte Balance  Outcome: Progressing  Goal: Optimal Gastrointestinal Function  Outcome: Progressing  Goal: Blood Glucose Level Within Target Range  Outcome: Progressing  Goal: Optimal Coagulation Function  Outcome: Progressing  Goal: Absence of Infection Signs and Symptoms  Outcome: Progressing  Goal: Optimal Neurologic Function  Outcome: Progressing  Goal: Improved Oral Intake  Outcome: Progressing  Goal: Optimal Pain Control, Comfort and Function  Outcome: Progressing  Goal: Optimize  Renal Function  Outcome: Progressing  Goal: Effective Oxygenation and Ventilation  Outcome: Progressing     Problem: Diarrhea  Goal: Effective Diarrhea Management  Outcome: Progressing

## 2024-08-09 NOTE — ASSESSMENT & PLAN NOTE
IR consulted for diagnostic liver biopsy to help r/o the liver as a primary cause  Biopsy performed 8/5/24; awaiting results    Cardiology consulted and performed  diagnostic heart cath on 8/5/24; results did not point to the heart as the causative agent.    IR liver biopsy performed 8/5/24 results showed cirrhosis but also pointed to a congestive hepatopathy; hepatology recommends looking into other potential underlying causes    Cytology studies negative for malignant cells;     MRI abdomen/pelvis will be done out patient.

## 2024-08-09 NOTE — PROGRESS NOTES
[facility] ([place] [id number])  [facility]  Progress Note    Patient Name: Vicky Alvarado  MRN: [id number]  Patient Class: IP- Inpatient   Admission Date: 7/26/2024  Length of Stay: 13 days  Attending Physician: Cory Hairston, *  Primary Care Provider: Gordy Cordero MD        Subjective:     Principal Problem:Anasarca        HPI:  Patient is a 59F with CHF (EF 60%, G2DD), HTN, Afib (s/p watchman procedure), T2DM (prev. on tirzepatide) presents to the ED w/ minor fall and progressively worsening SOB. Notes she couldn't get up after her fall, came in in a wheelchair. Notes she has had SOB for the past month with exertion, though now it is with rest as well. Reports orthopnea during this time frame. States she does not have any pain with breathing, but does additionally endorse occasional, intermittent chest pain. Denies abdominal pain, fever, or chills. States she has early satiety. No prior admission for HF exacerbation. Reports experiencing b/l lower extremity edema previously, but denies prior abdominal swelling. Swelling precluding normal ambulation, using walker (previously for balance) to assist d.t weakness. Has not missed doses of home lasix or antihypertensive medications. No reduced urine output at home.     In ED, normotensive, no tachycardia, some tachypnea (RR 22-28), initially on NRB -> HFNC 12L -> BIPAP -> 6L. No desaturations recorded during transitions in O2 therapy (BIPAP primarily placed for pulmonary edema seen on CXR). CBC with normocytic anemia (Hb 10.6), no leukocytosis. CMP with hypokalemia K 2.9, Cr 1.8/BUN 27 (baseline appears to be 1-1.4), Alb 2.7, no LFT elevation. Mg 1.5. , Troponin normal. VBG 7.46 / 42. CT AP with cirrhotic liver morphology, questionable GB sludge/stones, large volume ascites. CXR with hypoventilatory changes, R>L perihilar infiltrates c/f CHF. EKG NSR.  Received K replacement, 100mg IV Lasix in ED.       Overview/Hospital  Course:  Patient is here for SOB due to volume overload. Patient has had CHF exacerbation in the past but also has evidence of a cirrhosis requiring a paracentesis in the ED in which they removed 7 L of fluid. The ascitic fluid was sent to lab and there is no evidence of SBP at this time. Abdominal CT shows a cirrhotic morphology of the liver. US/doppler of  liver shows evidence of cirrhosis, splenotmegally, and portal hypertension. She shows no signs of jaundice. Her liver enzymes, Alk Phos, and bilirubin are wnl. Protein studies of the ascitic fluid show increased protein leaning more towards a cardiac etiology.  CXR shows heart to be boarderline in regards to being enlarged and some perihilar infiltrate. ECHO on (7/29/24) showed intermediate diastolic dysfunction with EF 60-65%, minor aortic stenosis, and moderate pulmonary hypertension with estimated pulmonary artery systolic pressure is 54 mmHg.Paracentesis performed (7/30/24) removed an additional 9.8L.  She has had some problems with confusion overnight. Believe that home BIPAP slipping off and patient not getting adequate saturation may be playing a role. Oriented by morning. Respiratory therapy switched out her mask and saturations overnight improved. Studies on ascitic fluid from paracentesis have been somewhat mixed in results. SAAG on initial paracentesis was 1.0 with a total protein of 3.2. Ascitic fluid studies on the next paracentesis (that pulled out 9.8 L) showed a SAAG of 1.5 and total protein of 2.9. IR consulted for liver biopsy to help r/o the liver as the cause of ascities. Right heart cath performed 8/5/24 and it did not show clear evidence of a cardiac component driving her ascites. Liver biopsy performed 8/5/24 showed cirhosis likley due to a congestive hepatopathy. Hepatology believes there may be another underlying cause behind the cirrhosis and  recommend further workup of other causes. Cytology fluid has been negative for malignant cells  thus far. MRI of the abdomen and pelvis showed a 2.6 x 2.4 cm left adrenal nodule. PETH score was mildly positive; Hepatology spoke with patient and advised to no longer drink alcohol. Additional paracentesis performed 8/6/24.    Interval History: Ms. Alvarado is still having some crampy on and off abdominal pain; currently around the umbilicus.Patient states it has been 2-3 days since she has had a substantial BM. Simethicone and Lactulose have been scheduled BID.     Review of Systems   Constitutional:  Negative for chills and diaphoresis.   Respiratory:  Positive for shortness of breath. Negative for cough.         SOB still present but improved; down to 2L   Cardiovascular:  Negative for chest pain, palpitations and leg swelling.   Gastrointestinal:  Positive for abdominal distention and abdominal pain. Negative for blood in stool, nausea and vomiting.        Crampy umbilical pain; simethicone helped some.     Patient states her abdomen feels much better since last paracentesis, but she has not had a substantial BM in 2-3 days.        Genitourinary:  Negative for difficulty urinating, dysuria and hematuria.   Neurological:  Negative for dizziness and headaches.     Objective:     Vital Signs (Most Recent):  Temp: 98.9 °F (37.2 °C) (08/09/24 0736)  Pulse: 64 (08/09/24 1057)  Resp: 18 (08/09/24 0736)  BP: (!) 107/58 (08/09/24 0736)  SpO2: (!) 91 % (08/09/24 1057) Vital Signs (24h Range):  Temp:  [96.7 °F (35.9 °C)-99.7 °F (37.6 °C)] 98.9 °F (37.2 °C)  Pulse:  [60-80] 64  Resp:  [6-28] 18  SpO2:  [87 %-95 %] 91 %  BP: ()/(50-66) 107/58     Weight: 99.2 kg (218 lb 11.1 oz)  Body mass index is 36.39 kg/m².    Intake/Output Summary (Last 24 hours) at 8/9/2024 1113  Last data filed at 8/9/2024 0600  Gross per 24 hour   Intake 240 ml   Output 0 ml   Net 240 ml         Physical Exam  Constitutional:       General: She is not in acute distress.     Appearance: She is obese. She is not ill-appearing.   HENT:       Head: Normocephalic and atraumatic.   Eyes:      General: No scleral icterus.  Cardiovascular:      Rate and Rhythm: Normal rate and regular rhythm.      Heart sounds: Murmur heard.   Pulmonary:      Effort: Pulmonary effort is normal.      Breath sounds: Normal breath sounds.      Comments: Patient on 2 L nasal canula  Abdominal:      General: Bowel sounds are normal. There is no distension.      Tenderness: There is abdominal tenderness. There is no guarding or rebound.      Comments: Mild tenderness in LLQ    Abdominal swelling present; still improved since last paracentesis.        Musculoskeletal:      Right lower leg: No edema.      Left lower leg: No edema.      Comments: Edema improved since admission. Slight pitting edema.  Patient not wearing compression stockings.    Skin:     General: Skin is warm and dry.      Coloration: Skin is not jaundiced.   Neurological:      Mental Status: She is alert and oriented to person, place, and time.   Psychiatric:         Mood and Affect: Mood normal.         Behavior: Behavior normal.             Significant Labs: All pertinent labs within the past 24 hours have been reviewed.    Significant Imaging: I have reviewed all pertinent imaging results/findings within the past 24 hours.    Assessment/Plan:      * Anasarca  Concerned anasarca may represent sequelae of cirrhosis. EGD 8/2021 with Portal hypertensive gastropathy. No varices. No abd pain, fever, chills, or confusion to raise concern for SBP or HE respectively    S/p liver biopsy, pending path.   S/p x3 para 8/6    - Favor albumin if hypotensive  - LVP   -Initial SBP Gram stain negative for WBC making SBP less likely  -studies on ascites fluid  leaned slightly toward cardiac origin but some results pointed more towards the liver;   - Pending MRI A/P for further evaluation     Right heart cath performed by cardiology; Results: Right heart cath found: normal biventricular filling pressures, borderline low CI/CO, and  mild pre-capillary pulmonary HTN.     Acute on chronic diastolic heart failure  Cardiorenal syndrome  Acute HF exacerbation of uncertain etiology (no missed doses of lasix). Last TTE with EF 60% and G2DD. Concern that she may have new R-sided HF given anasarca (large volume ascites) versus now concomitant MASH cirrhosis compounding volume overloaded state and therefore needs higher diuresis at baseline. Suspect JHONATAN d.t overload - CRS.     Total 17L removed via para + 8L UOP  TTE with intermediate diastolic dysfunction with EF 60-65%, minor aortic stenosis, and moderate pulmonary hypertension with estimated pulmonary artery systolic pressure is 54 mmHg.    - Intravascularly depleted, holding lasix 80mg IV and spironolactone 50mg  - RFP BID  - BIPAP qhs and during naps     - Fluid restriction at 1200 cc with strict I/Os and daily weights   - Check Electrolytes, keep Mag >2 & K+ >4  - Ambulate as tolerated      Stage 3b chronic kidney disease  Improved    Creatine stable for now. BMP reviewed- noted Estimated Creatinine Clearance: 47 mL/min (A) (based on SCr of 1.5 mg/dL (H)). according to latest data. Based on current GFR, CKD stage is stage 3 - GFR 30-59.  Monitor UOP and serial BMP and adjust therapy as needed. Renally dose meds. Avoid nephrotoxic medications and procedures.    Currently holding diuretics due to dip in kidney function    NAFLD (nonalcoholic fatty liver disease)  Seen by Dr. Saba with hepatology for previous chronic mild elevation in LFT, determined most likely NADLF. Now with cirrhotic morphology on CT. Previous EGD with portal gastropathy. LFT normal on admit. 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     - spironolactone 25mg and Lasix 80 BID holding diuretics due to dip in kidney function      -IR consulted; performed liver biopsy; results showed cirrhosis with congestive hepatopathy; IR recommends further workup.      Volume overload  IR consulted for  diagnostic liver biopsy to help r/o the liver as a primary cause  Biopsy performed 8/5/24; awaiting results    Cardiology consulted and performed  diagnostic heart cath on 8/5/24; results did not point to the heart as the causative agent.    IR liver biopsy performed 8/5/24 results showed cirrhosis but also pointed to a congestive hepatopathy; hepatology recommends looking into other potential underlying causes    Cytology studies negative for malignant cells;     MRI abdomen/pelvis showed: 2.6 x 2.4 cm indeterminate left  adrenal nodule.        Type 2 diabetes mellitus with diabetic polyneuropathy, without long-term current use of insulin  Patient's FSGs are controlled on current medication regimen.  Last A1c reviewed-   Lab Results   Component Value Date    HGBA1C 4.9 07/27/2024     Blood sugars are staying in the 80s-90s  Current correctional scale   holding as BG stable    Hold Oral hypoglycemics while patient is in the hospital.    Atrial Fibrillation (paroxysmal)  Patient with Paroxysmal (<7 days) atrial fibrillation which is controlled currently with Beta Blocker (Metoprolol succinate 100 mg BID) Patient is currently in sinus rhythm.[id number] Score: 3. Anticoagulation not indicated due to has Watchman .    Essential hypertension  Chronic, controlled. Latest blood pressure and vitals reviewed-     Temp:  [97 °F (36.1 °C)-97.7 °F (36.5 °C)]   Pulse:  [57-79]   Resp:  [15-27]   BP: (103-142)/(56-86)   SpO2:  [92 %-100 %] .   Home meds for hypertension were reviewed and noted below.   Hypertension Medications               furosemide (LASIX) 40 MG tablet Take 1 tablet (40 mg total) by mouth 2 (two) times daily. Resume as needed for edema until followup with your PCP.    lisinopriL (PRINIVIL,ZESTRIL) 40 MG tablet Take 1 tablet (40 mg total) by mouth once daily.    metoprolol succinate (TOPROL-XL) 100 MG 24 hr tablet Take 1 tablet (100 mg total) by mouth 2 (two) times daily.    NIFEdipine (PROCARDIA-XL) 60 MG  (OSM) 24 hr tablet Take 1 tablet (60 mg total) by mouth once daily.            While in the hospital, will manage blood pressure as follows; Adjust home antihypertensive regimen as follows- Continue toprol for afib, hold other antihypertensives d/t need for aggressive diuresis plus presence of JHONATAN. Did add spironolactone for K retention and now presence of ascites in setting of possible cirrhosis     Will utilize p.r.n. blood pressure medication only if patient's blood pressure greater than 220/110 and she develops symptoms such as worsening chest pain or shortness of breath.      VTE Risk Mitigation (From admission, onward)           Ordered     heparin (porcine) injection 5,000 Units  Every 12 hours         08/09/24 1000     IP VTE HIGH RISK PATIENT  Once         07/27/24 0351     Place sequential compression device  Until discontinued         07/27/24 0351                    Discharge Planning   RAYO: 8/9/2024     Code Status: Full Code   Is the patient medically ready for discharge?:     Reason for patient still in hospital (select all that apply): Patient trending condition and Treatment  Discharge Plan A: Skilled Nursing Facility   Discharge Delays: None known at this time              Marah Castro MD  Department of Hospital Medicine   [facility] ([place]

## 2024-08-09 NOTE — PT/OT/SLP PROGRESS
Physical Therapy Treatment  Co-treatment with OT due to acuity of condition, level of skilled assist needed for assessment of safety with mobility and potential of not tolerating a second treatment session.     Patient Name:  Vicky Alvarado   MRN:  [id number]    Recommendations:     Discharge Recommendations: Moderate Intensity Therapy  Discharge Equipment Recommendations: to be determined by next level of care  Barriers to discharge:  current level of assistance required     Assessment:     Vicky Alvarado is a 59 y.o. female admitted with a medical diagnosis of Anasarca.  She presents with the following impairments/functional limitations: weakness, impaired endurance, impaired self care skills, impaired functional mobility, gait instability, pain, decreased safety awareness, decreased lower extremity function, decreased upper extremity function, impaired cardiopulmonary response to activity, edema Pt tolerated treatment session well today. Pt requiring assistance for bed mobility, transfers and gait training. Pt with difficulty advancing LE due to pain, weakness and anxiety. Patient remains appropriate for continued skilled services within the acute environment and goals remain appropriate.   .    Rehab Prognosis: Good; patient would benefit from acute skilled PT services to address these deficits and reach maximum level of function.    Recent Surgery: Procedure(s) (LRB):  INSERTION, CATHETER, RIGHT HEART (Right) 4 Days Post-Op    Plan:     During this hospitalization, patient to be seen 4 x/week to address the identified rehab impairments via gait training, therapeutic activities, therapeutic exercises, neuromuscular re-education and progress toward the following goals:    Plan of Care Expires:  08/29/24    Subjective     Chief Complaint: B foot pain   Patient/Family Comments/goals: Pt agreeable to PT   Pain/Comfort:  Pain Rating 1:  (not rated)  Location - Side 1: Bilateral  Location - Orientation 1:  generalized  Location 1: foot  Pain Addressed 1: Reposition, Distraction  Pain Rating Post-Intervention 1:  (not rated)      Objective:     Communicated with Rn prior to session.  Patient found supine with telemetry, pulse ox (continuous), PureWick upon PT entry to room.     General Precautions: Standard, fall  Orthopedic Precautions: N/A  Braces: N/A  Respiratory Status: Room air     Functional Mobility:  First session:   Bed Mobility:     Supine to Sit: minimum assistance  EOB sitting: Mickey due to dizziness, symptoms resolving with rest improving to CGA     Transfers:     Sit to Stand:  maximal assistance with hand-held assist  Bed to Chair: maximal assistance and of 2 persons with  no AD  using  Stand Pivot  Pt performed 10 repetitions of seated B LE exercises consisting of: Marching, LAQ, ABD/ADD, heel raises, and toe raises.     Second session:   Transfers:  Chair <> commode: MaxA no AD via stand pivot   Chair to bed: MaxA no AD via stand pivot   Sit to supine: maxA x 2       AM-PAC 6 CLICK MOBILITY  Turning over in bed (including adjusting bedclothes, sheets and blankets)?: 3  Sitting down on and standing up from a chair with arms (e.g., wheelchair, bedside commode, etc.): 2  Moving from lying on back to sitting on the side of the bed?: 3  Moving to and from a bed to a chair (including a wheelchair)?: 2  Need to walk in hospital room?: 2  Climbing 3-5 steps with a railing?: 1  Basic Mobility Total Score: 13       Treatment & Education:  Therapist provided instruction and educated for safety during bed mobility and transfers. As well as proper body mechanics, energy conservation, and fall prevention strategies during tasks listed above, and the effects of prolonged immobility and the importance of performing EOB/OOB activity and exercises to promote healing and reduce recovery time.       Patient left supine with all lines intact, call button in reach, and RN notified..    GOALS:   Multidisciplinary Problems        Physical Therapy Goals          Problem: Physical Therapy    Goal Priority Disciplines Outcome Goal Variances Interventions   Physical Therapy Goal     PT, PT/OT Progressing     Description: Goals to be met by: 2024     Patient will increase functional independence with mobility by performin. Supine to sit with Stand-by Assistance  2. Sit to supine with Stand-by Assistance  3. Sit to stand transfer with Supervision  4. Bed to chair transfer with Supervision using LRAD  5. Gait  x 100 feet with Contact Guard Assistance using LRAD.   6. Ascend/descend 1 stair with no Handrails Contact Guard Assistance using LRAD.     The mobility limitation cannot be sufficiently resolved by the use of a cane.   Patient's functional mobility deficit can be sufficiently resolved with the use of a (bariatric Rolling Walker or Walker).  Patient's mobility limitation significantly impairs their ability to participate in one of more activities of daily living.  The use of a (bariatric RW or Walker) will significantly improve the patient's ability to participate in MRADLS and the patient will use it on regular basis in the home.                           Time Tracking:     PT Received On: 24  PT Start Time: 1051     PT Stop Time: 1132 (second session (12:45-13:10 = 25 minutes))  PT Total Time (min): 41 min + 25 minutes = 66 minutes   4 billable units     Billable Minutes: Therapeutic Activity 35, Therapeutic Exercise 15, and Neuromuscular Re-education 16    Treatment Type: Treatment  PT/PTA: PTA     Number of PTA visits since last PT visit: 2     2024

## 2024-08-09 NOTE — SUBJECTIVE & OBJECTIVE
Interval History: Ms. Alvarado is still having some crampy on and off abdominal pain; currently around the umbilicus.Patient states it has been 2-3 days since she has had a substantial BM. Simethicone and Lactulose have been scheduled BID.     Review of Systems   Constitutional:  Negative for chills and diaphoresis.   Respiratory:  Positive for shortness of breath. Negative for cough.         SOB still present but improved; down to 2L   Cardiovascular:  Negative for chest pain, palpitations and leg swelling.   Gastrointestinal:  Positive for abdominal distention and abdominal pain. Negative for blood in stool, nausea and vomiting.        Crampy umbilical pain; simethicone helped some.     Patient states her abdomen feels much better since last paracentesis, but she has not had a substantial BM in 2-3 days.        Genitourinary:  Negative for difficulty urinating, dysuria and hematuria.   Neurological:  Negative for dizziness and headaches.     Objective:     Vital Signs (Most Recent):  Temp: 98.9 °F (37.2 °C) (08/09/24 0736)  Pulse: 64 (08/09/24 1057)  Resp: 18 (08/09/24 0736)  BP: (!) 107/58 (08/09/24 0736)  SpO2: (!) 91 % (08/09/24 1057) Vital Signs (24h Range):  Temp:  [96.7 °F (35.9 °C)-99.7 °F (37.6 °C)] 98.9 °F (37.2 °C)  Pulse:  [60-80] 64  Resp:  [6-28] 18  SpO2:  [87 %-95 %] 91 %  BP: ()/(50-66) 107/58     Weight: 99.2 kg (218 lb 11.1 oz)  Body mass index is 36.39 kg/m².    Intake/Output Summary (Last 24 hours) at 8/9/2024 1113  Last data filed at 8/9/2024 0600  Gross per 24 hour   Intake 240 ml   Output 0 ml   Net 240 ml         Physical Exam  Constitutional:       General: She is not in acute distress.     Appearance: She is obese. She is not ill-appearing.   HENT:      Head: Normocephalic and atraumatic.   Eyes:      General: No scleral icterus.  Cardiovascular:      Rate and Rhythm: Normal rate and regular rhythm.      Heart sounds: Murmur heard.   Pulmonary:      Effort: Pulmonary effort is  normal.      Breath sounds: Normal breath sounds.      Comments: Patient on 2 L nasal canula  Abdominal:      General: Bowel sounds are normal. There is no distension.      Tenderness: There is abdominal tenderness. There is no guarding or rebound.      Comments: Mild tenderness in LLQ    Abdominal swelling present; still improved since last paracentesis.        Musculoskeletal:      Right lower leg: No edema.      Left lower leg: No edema.      Comments: Edema improved since admission. Slight pitting edema.  Patient not wearing compression stockings.    Skin:     General: Skin is warm and dry.      Coloration: Skin is not jaundiced.   Neurological:      Mental Status: She is alert and oriented to person, place, and time.   Psychiatric:         Mood and Affect: Mood normal.         Behavior: Behavior normal.             Significant Labs: All pertinent labs within the past 24 hours have been reviewed.    Significant Imaging: I have reviewed all pertinent imaging results/findings within the past 24 hours.

## 2024-08-09 NOTE — PROGRESS NOTES
[facility] ([place] [id number])  [facility]  Progress Note    Patient Name: Vicky Alvarado  MRN: [id number]  Patient Class: IP- Inpatient   Admission Date: 7/26/2024  Length of Stay: 12 days  Attending Physician: Cory Hairston, *  Primary Care Provider: Gordy Cordero MD        Subjective:     Principal Problem:Anasarca        HPI:  Patient is a 59F with CHF (EF 60%, G2DD), HTN, Afib (s/p watchman procedure), T2DM (prev. on tirzepatide) presents to the ED w/ minor fall and progressively worsening SOB. Notes she couldn't get up after her fall, came in in a wheelchair. Notes she has had SOB for the past month with exertion, though now it is with rest as well. Reports orthopnea during this time frame. States she does not have any pain with breathing, but does additionally endorse occasional, intermittent chest pain. Denies abdominal pain, fever, or chills. States she has early satiety. No prior admission for HF exacerbation. Reports experiencing b/l lower extremity edema previously, but denies prior abdominal swelling. Swelling precluding normal ambulation, using walker (previously for balance) to assist d.t weakness. Has not missed doses of home lasix or antihypertensive medications. No reduced urine output at home.     In ED, normotensive, no tachycardia, some tachypnea (RR 22-28), initially on NRB -> HFNC 12L -> BIPAP -> 6L. No desaturations recorded during transitions in O2 therapy (BIPAP primarily placed for pulmonary edema seen on CXR). CBC with normocytic anemia (Hb 10.6), no leukocytosis. CMP with hypokalemia K 2.9, Cr 1.8/BUN 27 (baseline appears to be 1-1.4), Alb 2.7, no LFT elevation. Mg 1.5. , Troponin normal. VBG 7.46 / 42. CT AP with cirrhotic liver morphology, questionable GB sludge/stones, large volume ascites. CXR with hypoventilatory changes, R>L perihilar infiltrates c/f CHF. EKG NSR.  Received K replacement, 100mg IV Lasix in ED.       Overview/Hospital  Course:  Patient is here for SOB due to volume overload. Patient has had CHF exacerbation in the past but also has evidence of a cirrhosis requiring a paracentesis in the ED in which they removed 7 L of fluid. The ascitic fluid was sent to lab and there is no evidence of SBP at this time. Abdominal CT shows a cirrhotic morphology of the liver. US/doppler of  liver shows evidence of cirrhosis, splenotmegally, and portal hypertension. She shows no signs of jaundice. Her liver enzymes, Alk Phos, and bilirubin are wnl. Protein studies of the ascitic fluid show increased protein leaning more towards a cardiac etiology.  CXR shows heart to be boarderline in regards to being enlarged and some perihilar infiltrate. ECHO on (7/29/24) showed intermediate diastolic dysfunction with EF 60-65%, minor aortic stenosis, and moderate pulmonary hypertension with estimated pulmonary artery systolic pressure is 54 mmHg.Paracentesis performed (7/30/24) removed an additional 9.8L.  She has had some problems with confusion overnight. Believe that home BIPAP slipping off and patient not getting adequate saturation may be playing a role. Oriented by morning. Respiratory therapy switched out her mask and saturations overnight improved. Studies on ascitic fluid from paracentesis have been somewhat mixed in results. SAAG on initial paracentesis was 1.0 with a total protein of 3.2. Ascitic fluid studies on the next paracentesis (that pulled out 9.8 L) showed a SAAG of 1.5 and total protein of 2.9. IR consulted for liver biopsy to help r/o the liver as the cause of ascities. Right heart cath performed 8/5/24 and it did not show clear evidence of a cardiac component driving her ascites. Liver biopsy performed 8/5/24 showed cirhosis likley due to a congestive hepatopathy. Hepatology believes there may be another underlying cause behind the cirrhosis and  recommend further workup of other causes. Cytology fluid has been negative for malignant cells  thus far. MRI of the abdomen and pelvis are pending. PETH score was mildly positive; Hepatology spoke with patient and advised to no longer drink alcohol. Additional paracentesis performed 8/6/24.    Interval History: Ms. Alvarado states that the site around her paracentesis is no longer sore. She does report severe crampy LLQ abdominal pain that onsets shortly after eating.  She mentioned that it might be gas, she was informed that Gas-X is available upon request. She reports only having small stools with no melena or hematochezia. MRI will be done outpatient.     Review of Systems   Constitutional:  Negative for chills and diaphoresis.   Respiratory:  Positive for shortness of breath. Negative for cough.         SOB still present but improved; down to 2L   Cardiovascular:  Negative for chest pain, palpitations and leg swelling.   Gastrointestinal:  Positive for abdominal distention and abdominal pain. Negative for blood in stool, nausea and vomiting.        Crampy LLQ pain; onsets shortly after eating.     Patient states her abdomen feels much better since last paracentesis.        Genitourinary:  Negative for difficulty urinating, dysuria and hematuria.   Neurological:  Negative for dizziness and headaches.     Objective:     Vital Signs (Most Recent):  Temp: 98 °F (36.7 °C) (08/08/24 0725)  Pulse: 65 (08/08/24 0725)  Resp: 18 (08/08/24 0725)  BP: 109/69 (08/08/24 0725)  SpO2: (!) 91 % (08/08/24 0725) Vital Signs (24h Range):  Temp:  [97.5 °F (36.4 °C)-98.2 °F (36.8 °C)] 98 °F (36.7 °C)  Pulse:  [62-75] 65  Resp:  [12-18] 18  SpO2:  [88 %-93 %] 91 %  BP: ()/(52-69) 109/69     Weight: 99.2 kg (218 lb 11.1 oz)  Body mass index is 36.39 kg/m².    Intake/Output Summary (Last 24 hours) at 8/8/2024 0826  Last data filed at 8/7/2024 2021  Gross per 24 hour   Intake 222 ml   Output 215 ml   Net 7 ml         Physical Exam  Constitutional:       General: She is not in acute distress.     Appearance: She is obese.  She is not ill-appearing.   HENT:      Head: Normocephalic and atraumatic.   Eyes:      General: No scleral icterus.  Cardiovascular:      Rate and Rhythm: Normal rate and regular rhythm.      Heart sounds: Murmur heard.   Pulmonary:      Effort: Pulmonary effort is normal.      Breath sounds: Normal breath sounds.      Comments: Patient on 2 L nasal canula  Abdominal:      General: Bowel sounds are normal. There is no distension.      Tenderness: There is abdominal tenderness. There is no guarding or rebound.      Comments: Mild tenderness in LLQ    Abdominal swelling is much improved since last paracentesis.        Musculoskeletal:      Right lower leg: No edema.      Left lower leg: No edema.      Comments: Edema improved since admission. Slight pitting edema.  Patient not wearing compression stockings.    Skin:     General: Skin is warm and dry.      Coloration: Skin is not jaundiced.   Neurological:      Mental Status: She is alert and oriented to person, place, and time.   Psychiatric:         Mood and Affect: Mood normal.         Behavior: Behavior normal.             Significant Labs: All pertinent labs within the past 24 hours have been reviewed.    Significant Imaging: I have reviewed all pertinent imaging results/findings within the past 24 hours.    Assessment/Plan:      * Anasarca  Concerned anasarca may represent sequelae of cirrhosis. EGD 8/2021 with Portal hypertensive gastropathy. No varices. No abd pain, fever, chills, or confusion to raise concern for SBP or HE respectively    S/p liver biopsy, pending path.   S/p x3 para 8/6    - Favor albumin if hypotensive  - LVP   -Initial SBP Gram stain negative for WBC making SBP less likely  -studies on ascites fluid  leaned slightly toward cardiac origin but some results pointed more towards the liver;   - Pending MRI A/P for further evaluation     Right heart cath performed by cardiology; Results: Right heart cath found: normal biventricular filling  pressures, borderline low CI/CO, and mild pre-capillary pulmonary HTN.     Acute on chronic diastolic heart failure  Cardiorenal syndrome  Acute HF exacerbation of uncertain etiology (no missed doses of lasix). Last TTE with EF 60% and G2DD. Concern that she may have new R-sided HF given anasarca (large volume ascites) versus now concomitant MASH cirrhosis compounding volume overloaded state and therefore needs higher diuresis at baseline. Suspect JHONATAN d.t overload - CRS.     Total 17L removed via para + 8L UOP  TTE with intermediate diastolic dysfunction with EF 60-65%, minor aortic stenosis, and moderate pulmonary hypertension with estimated pulmonary artery systolic pressure is 54 mmHg.    - Intravascularly depleted, holding lasix 80mg IV and spironolactone 50mg  - RFP BID  - BIPAP qhs and during naps     - Fluid restriction at 1200 cc with strict I/Os and daily weights   - Check Electrolytes, keep Mag >2 & K+ >4  - Ambulate as tolerated      Stage 3b chronic kidney disease  Improved    Creatine stable for now. BMP reviewed- noted Estimated Creatinine Clearance: 47 mL/min (A) (based on SCr of 1.5 mg/dL (H)). according to latest data. Based on current GFR, CKD stage is stage 3 - GFR 30-59.  Monitor UOP and serial BMP and adjust therapy as needed. Renally dose meds. Avoid nephrotoxic medications and procedures.    Currently holding diuretics due to dip in kidney function    NAFLD (nonalcoholic fatty liver disease)  Seen by Dr. Saba with hepatology for previous chronic mild elevation in LFT, determined most likely NADLF. Now with cirrhotic morphology on CT. Previous EGD with portal gastropathy. LFT normal on admit. 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     - spironolactone 25mg and Lasix 80 BID holding diuretics due to dip in kidney function      -IR consulted; performed liver biopsy; results showed cirrhosis with congestive hepatopathy; IR recommends further workup.      Volume  overload  IR consulted for diagnostic liver biopsy to help r/o the liver as a primary cause  Biopsy performed 8/5/24; awaiting results    Cardiology consulted and performed  diagnostic heart cath on 8/5/24; results did not point to the heart as the causative agent.    IR liver biopsy performed 8/5/24 results showed cirrhosis but also pointed to a congestive hepatopathy; hepatology recommends looking into other potential underlying causes    Cytology studies negative for malignant cells;     MRI abdomen/pelvis will be done out patient.        Type 2 diabetes mellitus with diabetic polyneuropathy, without long-term current use of insulin  Patient's FSGs are controlled on current medication regimen.  Last A1c reviewed-   Lab Results   Component Value Date    HGBA1C 4.9 07/27/2024     Blood sugars are staying in the 80s-90s  Current correctional scale   holding as BG stable    Hold Oral hypoglycemics while patient is in the hospital.    Atrial Fibrillation (paroxysmal)  Patient with Paroxysmal (<7 days) atrial fibrillation which is controlled currently with Beta Blocker (Metoprolol succinate 100 mg BID) Patient is currently in sinus rhythm.[id number] Score: 3. Anticoagulation not indicated due to has Watchman .    Essential hypertension  Chronic, controlled. Latest blood pressure and vitals reviewed-     Temp:  [97 °F (36.1 °C)-97.7 °F (36.5 °C)]   Pulse:  [57-79]   Resp:  [15-27]   BP: (103-142)/(56-86)   SpO2:  [92 %-100 %] .   Home meds for hypertension were reviewed and noted below.   Hypertension Medications               furosemide (LASIX) 40 MG tablet Take 1 tablet (40 mg total) by mouth 2 (two) times daily. Resume as needed for edema until followup with your PCP.    lisinopriL (PRINIVIL,ZESTRIL) 40 MG tablet Take 1 tablet (40 mg total) by mouth once daily.    metoprolol succinate (TOPROL-XL) 100 MG 24 hr tablet Take 1 tablet (100 mg total) by mouth 2 (two) times daily.    NIFEdipine (PROCARDIA-XL) 60 MG (OSM) 24  hr tablet Take 1 tablet (60 mg total) by mouth once daily.            While in the hospital, will manage blood pressure as follows; Adjust home antihypertensive regimen as follows- Continue toprol for afib, hold other antihypertensives d/t need for aggressive diuresis plus presence of JHONATAN. Did add spironolactone for K retention and now presence of ascites in setting of possible cirrhosis     Will utilize p.r.n. blood pressure medication only if patient's blood pressure greater than 220/110 and she develops symptoms such as worsening chest pain or shortness of breath.      VTE Risk Mitigation (From admission, onward)           Ordered     heparin (porcine) injection 7,500 Units  Every 8 hours         07/29/24 1202     IP VTE HIGH RISK PATIENT  Once         07/27/24 0351     Place sequential compression device  Until discontinued         07/27/24 0351                    Discharge Planning   RAYO: 8/9/2024     Code Status: Full Code   Is the patient medically ready for discharge?:     Reason for patient still in hospital (select all that apply): Pending disposition  Discharge Plan A: Skilled Nursing Facility   Discharge Delays: None known at this time              Marah Castro MD  Department of Hospital Medicine   [facility] ([place])

## 2024-08-09 NOTE — ASSESSMENT & PLAN NOTE
IR consulted for diagnostic liver biopsy to help r/o the liver as a primary cause  Biopsy performed 8/5/24; awaiting results    Cardiology consulted and performed  diagnostic heart cath on 8/5/24; results did not point to the heart as the causative agent.    IR liver biopsy performed 8/5/24 results showed cirrhosis but also pointed to a congestive hepatopathy; hepatology recommends looking into other potential underlying causes    Cytology studies negative for malignant cells;     MRI abdomen/pelvis showed: 2.6 x 2.4 cm indeterminate left  adrenal nodule.

## 2024-08-09 NOTE — PT/OT/SLP PROGRESS
Occupational Therapy   Treatment    Name: Vicky Alvarado  MRN: [id number]  Admitting Diagnosis:  Anasarca  4 Days Post-Op    Recommendations:     Discharge Recommendations: Moderate Intensity Therapy  Discharge Equipment Recommendations:  to be determined by next level of care  Barriers to discharge:  Decreased caregiver support, Other (Comment) (patient requires increased level of assistance)    Assessment:     Vicky Alvarado is a 59 y.o. female with a medical diagnosis of Anasarca.  She presents with the fallowing performance deficits affecting function are weakness, impaired endurance, impaired self care skills, impaired functional mobility, gait instability, impaired balance, pain, impaired cardiopulmonary response to activity, decreased safety awareness, decreased lower extremity function, decreased upper extremity function. Patient agreeable to tx session, patient is demonstrating progress with bed mobility, sitting balance, and self-care task, however; patient continues to have limited activity tolerance due to pain and weakness from recent surgical procedure. Patient would benefit from Moderate intensity therapy intervention to address over all functional decline with mobility task, endurance, and ADLs in order to return to PLOF.     Rehab Prognosis:  Good; patient would benefit from acute skilled OT services to address these deficits and reach maximum level of function.       Plan:     Patient to be seen 4 x/week to address the above listed problems via self-care/home management, therapeutic activities, therapeutic exercises  Plan of Care Expires: 08/29/24  Plan of Care Reviewed with: patient    Subjective     Chief Complaint: pain and dizziness   Patient/Family Comments/goals: to return to PLOF  Pain/Comfort:  Pain Rating 1:  (patient did not rate)  Location - Side 1: Bilateral  Location - Orientation 1: generalized  Location 1: foot  Pain Addressed 1: Reposition, Distraction  Pain Rating  Post-Intervention 1:  (patient did not rate)    Objective:     Communicated with: Nurse prior to session.  Patient found HOB elevated with pulse ox (continuous), telemetry, PureWick upon OT entry to room.  Co-treated with PT due to patient complexity deficits requiring two skilled therapist to appropriately and safely mobilized patient while facilitating functional task in addition to accommodating for patient's activity tolerance.    General Precautions: Standard, fall    Orthopedic Precautions:N/A  Braces: N/A  Respiratory Status: Room air     Occupational Performance:     Bed Mobility:    Patient completed Rolling/Turning to Right with minimum assistance  Patient completed Scooting anteriorly to EOB with minimum assistance  Patient required Max A of 2 persons for scooting to HOB   Patient completed Supine to Sit with minimum assistance  Patient completed Sit to Supine with maximal assistance of 2 persons to mange trunk and legs     Functional Mobility/Transfers:  Patient completed Sit <> Stand Transfer from EOB  with maximal assistance  with  no assistive device and hand-held assist   Patient completed Bed <> Chair Transfer using Stand Pivot technique with maximal assistance of 2 persons with no assistive device and hand-held assist  Patient completed Chair <> BSC Stand Pivot technique with maximal assistance with no assistive device and hand-held assist  Patient completed BSC<>bed transfer using Stand pivot technique with maximal assistance of 2 persons with no AD and HHA  Functional Mobility: Patient not able to take any steps due to increased feet/knee pain     Activities of Daily Living:  Grooming: Setup A to wash face   Upper Body Dressing: minimum assistance to don/doff gown   Lower Body Dressing: moderate assistance to doff socks and max assistance to nena  Toileting: contact guard assistance for pericare hygiene in sitting       AMPA 6 Click ADL: 17    Treatment & Education:  Discussed OT POC and  progress  Educated patient on the importance to continue to perform exercises in order to reduce stiffness and promote joint mobility and blood flow  Addressed patient's questions and concerns within [facility] scope of practice      Patient left HOB elevated with all lines intact, call button in reach, and nurse notified    GOALS:   Multidisciplinary Problems       Occupational Therapy Goals          Problem: Occupational Therapy    Goal Priority Disciplines Outcome Interventions   Occupational Therapy Goal     OT, PT/OT Progressing    Description: Goals to be met by: 8/5/2024     Patient will increase functional independence with ADLs by performing:    UE Dressing with Set-up Assistance.  LE Dressing with Minimal Assistance.  Grooming while standing at sink with Supervision.  Toileting from toilet with Supervision for hygiene and clothing management.   Toilet transfer to toilet with Supervision.                         Time Tracking:     OT Date of Treatment: 08/09/24  OT Start Time: 1051 2nd session Start time: 1255  OT Stop Time: 1132  2nd session Stop time: 1310  OT Total Time (min): 41 min+15=56 min 4 units     Billable Minutes:Self Care/Home Management 36  Therapeutic Activity 20    OT/GASPER: GASPER     Number of GASPER visits since last OT visit: 1    8/9/2024

## 2024-08-09 NOTE — NURSING
Pt given TB skin test to left forearm. Transdermally.  Site circled with blue marker to indicate spot. Scanned all information noted in MAR.

## 2024-08-10 LAB
ALBUMIN SERPL BCP-MCNC: 2.4 G/DL (ref 3.5–5.2)
ALP SERPL-CCNC: 193 U/L (ref 55–135)
ALT SERPL W/O P-5'-P-CCNC: 7 U/L (ref 10–44)
ANION GAP SERPL CALC-SCNC: 12 MMOL/L (ref 8–16)
AST SERPL-CCNC: 25 U/L (ref 10–40)
BILIRUB SERPL-MCNC: 0.9 MG/DL (ref 0.1–1)
BUN SERPL-MCNC: 29 MG/DL (ref 6–20)
CALCIUM SERPL-MCNC: 9.7 MG/DL (ref 8.7–10.5)
CHLORIDE SERPL-SCNC: 94 MMOL/L (ref 95–110)
CO2 SERPL-SCNC: 28 MMOL/L (ref 23–29)
CREAT SERPL-MCNC: 1.3 MG/DL (ref 0.5–1.4)
EST. GFR  (NO RACE VARIABLE): 47.1 ML/MIN/1.73 M^2
GLUCOSE SERPL-MCNC: 111 MG/DL (ref 70–110)
MAGNESIUM SERPL-MCNC: 1.8 MG/DL (ref 1.6–2.6)
PHOSPHATE SERPL-MCNC: 3.6 MG/DL (ref 2.7–4.5)
POCT GLUCOSE: 116 MG/DL (ref 70–110)
POTASSIUM SERPL-SCNC: 4.1 MMOL/L (ref 3.5–5.1)
PROT SERPL-MCNC: 6.4 G/DL (ref 6–8.4)
SODIUM SERPL-SCNC: 134 MMOL/L (ref 136–145)

## 2024-08-10 PROCEDURE — 36415 COLL VENOUS BLD VENIPUNCTURE: CPT

## 2024-08-10 PROCEDURE — 94660 CPAP INITIATION&MGMT: CPT

## 2024-08-10 PROCEDURE — 63600175 PHARM REV CODE 636 W HCPCS: Performed by: STUDENT IN AN ORGANIZED HEALTH CARE EDUCATION/TRAINING PROGRAM

## 2024-08-10 PROCEDURE — 99900035 HC TECH TIME PER 15 MIN (STAT)

## 2024-08-10 PROCEDURE — 84100 ASSAY OF PHOSPHORUS: CPT

## 2024-08-10 PROCEDURE — 83735 ASSAY OF MAGNESIUM: CPT

## 2024-08-10 PROCEDURE — 63600175 PHARM REV CODE 636 W HCPCS

## 2024-08-10 PROCEDURE — 80053 COMPREHEN METABOLIC PANEL: CPT | Performed by: STUDENT IN AN ORGANIZED HEALTH CARE EDUCATION/TRAINING PROGRAM

## 2024-08-10 PROCEDURE — 94761 N-INVAS EAR/PLS OXIMETRY MLT: CPT

## 2024-08-10 PROCEDURE — 27000190 HC CPAP FULL FACE MASK W/VALVE

## 2024-08-10 PROCEDURE — 25000003 PHARM REV CODE 250

## 2024-08-10 PROCEDURE — 20600001 HC STEP DOWN PRIVATE ROOM

## 2024-08-10 RX ORDER — FUROSEMIDE 20 MG/1
20 TABLET ORAL DAILY
Status: DISCONTINUED | OUTPATIENT
Start: 2024-08-10 | End: 2024-08-12

## 2024-08-10 RX ADMIN — ALPRAZOLAM 0.25 MG: 0.25 TABLET ORAL at 12:08

## 2024-08-10 RX ADMIN — SIMETHICONE 80 MG: 80 TABLET, CHEWABLE ORAL at 08:08

## 2024-08-10 RX ADMIN — PROCHLORPERAZINE EDISYLATE 5 MG: 5 INJECTION INTRAMUSCULAR; INTRAVENOUS at 12:08

## 2024-08-10 RX ADMIN — METOPROLOL SUCCINATE 100 MG: 100 TABLET, EXTENDED RELEASE ORAL at 08:08

## 2024-08-10 RX ADMIN — ACETAMINOPHEN 650 MG: 325 TABLET ORAL at 01:08

## 2024-08-10 RX ADMIN — LACTULOSE 10 G: 20 SOLUTION ORAL at 08:08

## 2024-08-10 RX ADMIN — Medication 3 MG: at 08:08

## 2024-08-10 RX ADMIN — ALUMINUM HYDROXIDE, MAGNESIUM HYDROXIDE, AND SIMETHICONE 30 ML: 1200; 120; 1200 SUSPENSION ORAL at 02:08

## 2024-08-10 RX ADMIN — DULOXETINE HYDROCHLORIDE 60 MG: 30 CAPSULE, DELAYED RELEASE ORAL at 08:08

## 2024-08-10 RX ADMIN — FUROSEMIDE 20 MG: 20 TABLET ORAL at 12:08

## 2024-08-10 RX ADMIN — PANTOPRAZOLE SODIUM 40 MG: 40 TABLET, DELAYED RELEASE ORAL at 08:08

## 2024-08-10 RX ADMIN — HEPARIN SODIUM 5000 UNITS: 5000 INJECTION INTRAVENOUS; SUBCUTANEOUS at 08:08

## 2024-08-10 NOTE — PLAN OF CARE
Metoprolol held for hypotention. Xanax 0.25mg given for anxiety. Maalox for indigestions, tylenol for pain. Had BM. On tele monitoring and video sitter.      Problem: Adult Inpatient Plan of Care  Goal: Plan of Care Review  Outcome: Progressing  Goal: Patient-Specific Goal (Individualized)  Outcome: Progressing  Goal: Absence of Hospital-Acquired Illness or Injury  Outcome: Progressing  Goal: Optimal Comfort and Wellbeing  Outcome: Progressing  Goal: Readiness for Transition of Care  Outcome: Progressing     Problem: Bariatric Environmental Safety  Goal: Safety Maintained with Care  Outcome: Progressing     Problem: Diabetes Comorbidity  Goal: Blood Glucose Level Within Targeted Range  Outcome: Progressing     Problem: Skin Injury Risk Increased  Goal: Skin Health and Integrity  Outcome: Progressing     Problem: Wound  Goal: Optimal Coping  Outcome: Progressing  Goal: Optimal Functional Ability  Outcome: Progressing  Goal: Absence of Infection Signs and Symptoms  Outcome: Progressing  Goal: Improved Oral Intake  Outcome: Progressing  Goal: Optimal Pain Control and Function  Outcome: Progressing  Goal: Skin Health and Integrity  Outcome: Progressing  Goal: Optimal Wound Healing  Outcome: Progressing     Problem: Pain Acute  Goal: Optimal Pain Control and Function  Outcome: Progressing     Problem: Liver Failure  Goal: Optimal Coping with Liver Failure  Outcome: Progressing  Goal: Fluid and Electrolyte Balance  Outcome: Progressing  Goal: Optimal Gastrointestinal Function  Outcome: Progressing  Goal: Blood Glucose Level Within Target Range  Outcome: Progressing  Goal: Optimal Coagulation Function  Outcome: Progressing  Goal: Absence of Infection Signs and Symptoms  Outcome: Progressing  Goal: Optimal Neurologic Function  Outcome: Progressing  Goal: Improved Oral Intake  Outcome: Progressing  Goal: Optimal Pain Control, Comfort and Function  Outcome: Progressing  Goal: Optimize Renal Function  Outcome:  Progressing  Goal: Effective Oxygenation and Ventilation  Outcome: Progressing     Problem: Diarrhea  Goal: Effective Diarrhea Management  Outcome: Progressing

## 2024-08-10 NOTE — ASSESSMENT & PLAN NOTE
Improved    Creatine stable for now. BMP reviewed- noted Estimated Creatinine Clearance: 53.7 mL/min (based on SCr of 1.3 mg/dL). according to latest data. Based on current GFR, CKD stage is stage 3 - GFR 30-59.  Monitor UOP and serial BMP and adjust therapy as needed. Renally dose meds. Avoid nephrotoxic medications and procedures.    Restarted diuretics will discontinue if kidney function significantly worsens.

## 2024-08-10 NOTE — PLAN OF CARE
Patient aao x's 4 o2 2l of o2 per nc o2 sat 98%. No c/o abdominal pain or nausea this shift but some anxiety noted. Call light in reach bed in lowest position and plan of care ongoing.              Problem: Adult Inpatient Plan of Care  Goal: Plan of Care Review  Outcome: Progressing  Goal: Patient-Specific Goal (Individualized)  Outcome: Progressing  Goal: Absence of Hospital-Acquired Illness or Injury  Outcome: Progressing  Goal: Optimal Comfort and Wellbeing  Outcome: Progressing  Goal: Readiness for Transition of Care  Outcome: Progressing     Problem: Bariatric Environmental Safety  Goal: Safety Maintained with Care  Outcome: Progressing     Problem: Diabetes Comorbidity  Goal: Blood Glucose Level Within Targeted Range  Outcome: Progressing     Problem: Skin Injury Risk Increased  Goal: Skin Health and Integrity  Outcome: Progressing     Problem: Wound  Goal: Optimal Coping  Outcome: Progressing  Goal: Optimal Functional Ability  Outcome: Progressing  Goal: Absence of Infection Signs and Symptoms  Outcome: Progressing  Goal: Improved Oral Intake  Outcome: Progressing  Goal: Optimal Pain Control and Function  Outcome: Progressing  Goal: Skin Health and Integrity  Outcome: Progressing  Goal: Optimal Wound Healing  Outcome: Progressing     Problem: Pain Acute  Goal: Optimal Pain Control and Function  Outcome: Progressing     Problem: Liver Failure  Goal: Optimal Coping with Liver Failure  Outcome: Progressing  Goal: Fluid and Electrolyte Balance  Outcome: Progressing  Goal: Optimal Gastrointestinal Function  Outcome: Progressing  Goal: Blood Glucose Level Within Target Range  Outcome: Progressing  Goal: Optimal Coagulation Function  Outcome: Progressing  Goal: Absence of Infection Signs and Symptoms  Outcome: Progressing  Goal: Optimal Neurologic Function  Outcome: Progressing  Goal: Improved Oral Intake  Outcome: Progressing  Goal: Optimal Pain Control, Comfort and Function  Outcome: Progressing  Goal: Optimize  Renal Function  Outcome: Progressing  Goal: Effective Oxygenation and Ventilation  Outcome: Progressing     Problem: Diarrhea  Goal: Effective Diarrhea Management  Outcome: Progressing

## 2024-08-10 NOTE — PROGRESS NOTES
[facility] ([place] [id number])  [facility] Medicine  Progress Note    Patient Name: Vicky Alvarado  MRN: [id number]  Patient Class: IP- Inpatient   Admission Date: 7/26/2024  Length of Stay: 14 days  Attending Physician: Evin Wong, *  Primary Care Provider: Gordy Cordero MD        Subjective:     Principal Problem:Anasarca        HPI:  Patient is a 59F with CHF (EF 60%, G2DD), HTN, Afib (s/p watchman procedure), T2DM (prev. on tirzepatide) presents to the ED w/ minor fall and progressively worsening SOB. Notes she couldn't get up after her fall, came in in a wheelchair. Notes she has had SOB for the past month with exertion, though now it is with rest as well. Reports orthopnea during this time frame. States she does not have any pain with breathing, but does additionally endorse occasional, intermittent chest pain. Denies abdominal pain, fever, or chills. States she has early satiety. No prior admission for HF exacerbation. Reports experiencing b/l lower extremity edema previously, but denies prior abdominal swelling. Swelling precluding normal ambulation, using walker (previously for balance) to assist d.t weakness. Has not missed doses of home lasix or antihypertensive medications. No reduced urine output at home.     In ED, normotensive, no tachycardia, some tachypnea (RR 22-28), initially on NRB -> HFNC 12L -> BIPAP -> 6L. No desaturations recorded during transitions in O2 therapy (BIPAP primarily placed for pulmonary edema seen on CXR). CBC with normocytic anemia (Hb 10.6), no leukocytosis. CMP with hypokalemia K 2.9, Cr 1.8/BUN 27 (baseline appears to be 1-1.4), Alb 2.7, no LFT elevation. Mg 1.5. , Troponin normal. VBG 7.46 / 42. CT AP with cirrhotic liver morphology, questionable GB sludge/stones, large volume ascites. CXR with hypoventilatory changes, R>L perihilar infiltrates c/f CHF. EKG NSR.  Received K replacement, 100mg IV Lasix in ED.       Overview/Hospital  Course:  Patient is here for SOB due to volume overload. Patient has had CHF exacerbation in the past but also has evidence of a cirrhosis requiring a paracentesis in the ED in which they removed 7 L of fluid. The ascitic fluid was sent to lab and there is no evidence of SBP at this time. Abdominal CT shows a cirrhotic morphology of the liver. US/doppler of  liver shows evidence of cirrhosis, splenotmegally, and portal hypertension. She shows no signs of jaundice. Her liver enzymes, Alk Phos, and bilirubin are wnl. Protein studies of the ascitic fluid show increased protein leaning more towards a cardiac etiology.  CXR shows heart to be boarderline in regards to being enlarged and some perihilar infiltrate. ECHO on (7/29/24) showed intermediate diastolic dysfunction with EF 60-65%, minor aortic stenosis, and moderate pulmonary hypertension with estimated pulmonary artery systolic pressure is 54 mmHg.Paracentesis performed (7/30/24) removed an additional 9.8L.  She has had some problems with confusion overnight. Believe that home BIPAP slipping off and patient not getting adequate saturation may be playing a role. Oriented by morning. Respiratory therapy switched out her mask and saturations overnight improved. Studies on ascitic fluid from paracentesis have been somewhat mixed in results. SAAG on initial paracentesis was 1.0 with a total protein of 3.2. Ascitic fluid studies on the next paracentesis (that pulled out 9.8 L) showed a SAAG of 1.5 and total protein of 2.9. IR consulted for liver biopsy to help r/o the liver as the cause of ascities. Right heart cath performed 8/5/24 and it did not show clear evidence of a cardiac component driving her ascites. Liver biopsy performed 8/5/24 showed cirhosis likley due to a congestive hepatopathy. Hepatology believes there may be another underlying cause behind the cirrhosis and  recommend further workup of other causes. Cytology fluid has been negative for malignant cells  thus far. MRI of the abdomen and pelvis showed a 2.6 x 2.4 cm left adrenal nodule. PETH score was mildly positive; Hepatology spoke with patient and advised to no longer drink alcohol. Additional paracentesis performed 8/6/24.    Interval History: Started patient on 20 mg Lasix daily. Will monitor kidney function. Lactulose is being given to encourage BM. Till having some crampy abdominal pain with nausea that comes and goes.     Review of Systems   Constitutional:  Negative for chills and diaphoresis.   Respiratory:  Positive for shortness of breath. Negative for cough.         SOB still present but improved; down to 2L   Cardiovascular:  Negative for chest pain, palpitations and leg swelling.   Gastrointestinal:  Positive for abdominal distention, abdominal pain and nausea. Negative for blood in stool and vomiting.        Some crampy abdominal pain that comes and goes. Giving lactulose to encourage BM; nausea with abdominal pain    Abdomen is starting to distend again, likely due to ascites.        Genitourinary:  Negative for difficulty urinating, dysuria and hematuria.   Neurological:  Negative for dizziness and headaches.     Objective:     Vital Signs (Most Recent):  Temp: 98.4 °F (36.9 °C) (08/10/24 1129)  Pulse: 73 (08/10/24 1541)  Resp: 20 (08/10/24 1129)  BP: 106/72 (08/10/24 1129)  SpO2: 100 % (08/10/24 1541) Vital Signs (24h Range):  Temp:  [98.2 °F (36.8 °C)-98.8 °F (37.1 °C)] 98.4 °F (36.9 °C)  Pulse:  [71-82] 73  Resp:  [16-20] 20  SpO2:  [92 %-100 %] 100 %  BP: (106-135)/(60-83) 106/72     Weight: 99.2 kg (218 lb 11.1 oz)  Body mass index is 36.39 kg/m².    Intake/Output Summary (Last 24 hours) at 8/10/2024 1612  Last data filed at 8/10/2024 0511  Gross per 24 hour   Intake 200 ml   Output 350 ml   Net -150 ml         Physical Exam  Constitutional:       General: She is not in acute distress.     Appearance: She is obese. She is not ill-appearing.   HENT:      Head: Normocephalic and atraumatic.    Eyes:      General: No scleral icterus.  Cardiovascular:      Rate and Rhythm: Normal rate and regular rhythm.      Heart sounds: Murmur heard.   Pulmonary:      Effort: Pulmonary effort is normal.      Breath sounds: Normal breath sounds.      Comments: Patient on 2 L nasal canula  Abdominal:      General: Bowel sounds are normal. There is distension.      Tenderness: There is abdominal tenderness. There is no guarding or rebound.      Comments: Abdominal swelling present; still improved since last paracentesis.        Musculoskeletal:      Right lower leg: No edema.      Left lower leg: No edema.      Comments: Trace bilateral lower extremity edema   Skin:     General: Skin is warm and dry.      Coloration: Skin is not jaundiced.   Neurological:      Mental Status: She is alert and oriented to person, place, and time.   Psychiatric:         Mood and Affect: Mood normal.         Behavior: Behavior normal.             Significant Labs: All pertinent labs within the past 24 hours have been reviewed.    Significant Imaging: I have reviewed all pertinent imaging results/findings within the past 24 hours.    Assessment/Plan:      * Anasarca  Concerned anasarca may represent sequelae of cirrhosis. EGD 8/2021 with Portal hypertensive gastropathy. No varices. No abd pain, fever, chills, or confusion to raise concern for SBP or HE respectively    S/p liver biopsy, pending path.   S/p x3 para 8/6    - Favor albumin if hypotensive  - LVP   -Initial SBP Gram stain negative for WBC making SBP less likely  -studies on ascites fluid  leaned slightly toward cardiac origin but some results pointed more towards the liver;   - Pending MRI A/P for further evaluation     Right heart cath performed by cardiology; Results: Right heart cath found: normal biventricular filling pressures, borderline low CI/CO, and mild pre-capillary pulmonary HTN.     Acute on chronic diastolic heart failure  Cardiorenal syndrome  Acute HF exacerbation of  uncertain etiology (no missed doses of lasix). Last TTE with EF 60% and G2DD. Concern that she may have new R-sided HF given anasarca (large volume ascites) versus now concomitant MASH cirrhosis compounding volume overloaded state and therefore needs higher diuresis at baseline. Suspect JHONATAN d.t overload - CRS.     Total 17L removed via para + 8L UOP  TTE with intermediate diastolic dysfunction with EF 60-65%, minor aortic stenosis, and moderate pulmonary hypertension with estimated pulmonary artery systolic pressure is 54 mmHg.    - Intravascularly depleted, holding lasix 80mg IV and spironolactone 50mg  - RFP BID  - BIPAP qhs and during naps     - Fluid restriction at 1200 cc with strict I/Os and daily weights   - Check Electrolytes, keep Mag >2 & K+ >4  - Ambulate as tolerated      Stage 3b chronic kidney disease  Improved    Creatine stable for now. BMP reviewed- noted Estimated Creatinine Clearance: 53.7 mL/min (based on SCr of 1.3 mg/dL). according to latest data. Based on current GFR, CKD stage is stage 3 - GFR 30-59.  Monitor UOP and serial BMP and adjust therapy as needed. Renally dose meds. Avoid nephrotoxic medications and procedures.    Restarted diuretics will discontinue if kidney function significantly worsens.     NAFLD (nonalcoholic fatty liver disease)  Seen by Dr. Saba with hepatology for previous chronic mild elevation in LFT, determined most likely NADLF. Now with cirrhotic morphology on CT. Previous EGD with portal gastropathy. LFT normal on admit. 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     - spironolactone 25mg and Lasix 80 BID holding diuretics due to dip in kidney function      -IR consulted; performed liver biopsy; results showed cirrhosis with congestive hepatopathy; IR recommends further workup.      Volume overload  IR consulted for diagnostic liver biopsy to help r/o the liver as a primary cause  Biopsy performed 8/5/24; awaiting  results    Cardiology consulted and performed  diagnostic heart cath on 8/5/24; results did not point to the heart as the causative agent.    IR liver biopsy performed 8/5/24 results showed cirrhosis but also pointed to a congestive hepatopathy; hepatology recommends looking into other potential underlying causes    Cytology studies negative for malignant cells;     MRI abdomen/pelvis showed: 2.6 x 2.4 cm indeterminate left  adrenal nodule.      Lasix 20 mg daily    Type 2 diabetes mellitus with diabetic polyneuropathy, without long-term current use of insulin  Patient's FSGs are controlled on current medication regimen.  Last A1c reviewed-   Lab Results   Component Value Date    HGBA1C 4.9 07/27/2024     Blood sugars are staying in the 80s-90s  Current correctional scale   holding as BG stable    Hold Oral hypoglycemics while patient is in the hospital.    Atrial Fibrillation (paroxysmal)  Patient with Paroxysmal (<7 days) atrial fibrillation which is controlled currently with Beta Blocker (Metoprolol succinate 100 mg BID) Patient is currently in sinus rhythm.[id number] Score: 3. Anticoagulation not indicated due to has Watchman .    Essential hypertension  Chronic, controlled. Latest blood pressure and vitals reviewed-     Temp:  [98.2 °F (36.8 °C)-98.8 °F (37.1 °C)]   Pulse:  [71-82]   Resp:  [16-20]   BP: (106-135)/(60-83)   SpO2:  [92 %-100 %] .   Home meds for hypertension were reviewed and noted below.   Hypertension Medications               furosemide (LASIX) 40 MG tablet Take 1 tablet (40 mg total) by mouth 2 (two) times daily. Resume as needed for edema until followup with your PCP.    lisinopriL (PRINIVIL,ZESTRIL) 40 MG tablet Take 1 tablet (40 mg total) by mouth once daily.    metoprolol succinate (TOPROL-XL) 100 MG 24 hr tablet Take 1 tablet (100 mg total) by mouth 2 (two) times daily.    NIFEdipine (PROCARDIA-XL) 60 MG (OSM) 24 hr tablet Take 1 tablet (60 mg total) by mouth once daily.             While in the hospital, will manage blood pressure as follows; Adjust home antihypertensive regimen as follows- Continue toprol for afib, hold other antihypertensives. Lasix only at 20 mg daily.     Will utilize p.r.n. blood pressure medication only if patient's blood pressure greater than 220/110 and she develops symptoms such as worsening chest pain or shortness of breath.      VTE Risk Mitigation (From admission, onward)           Ordered     Place AL hose  Until discontinued         08/10/24 1104     IP VTE HIGH RISK PATIENT  Once         07/27/24 0351     Place sequential compression device  Until discontinued         07/27/24 0351                    Discharge Planning   RAYO: 8/12/2024     Code Status: Full Code   Is the patient medically ready for discharge?:     Reason for patient still in hospital (select all that apply): Patient trending condition and Treatment  Discharge Plan A: Skilled Nursing Facility   Discharge Delays: None known at this time              Marah Castro MD  Department of Hospital Medicine   [facility] ([place])

## 2024-08-10 NOTE — ASSESSMENT & PLAN NOTE
IR consulted for diagnostic liver biopsy to help r/o the liver as a primary cause  Biopsy performed 8/5/24; awaiting results    Cardiology consulted and performed  diagnostic heart cath on 8/5/24; results did not point to the heart as the causative agent.    IR liver biopsy performed 8/5/24 results showed cirrhosis but also pointed to a congestive hepatopathy; hepatology recommends looking into other potential underlying causes    Cytology studies negative for malignant cells;     MRI abdomen/pelvis showed: 2.6 x 2.4 cm indeterminate left  adrenal nodule.      Lasix 20 mg daily

## 2024-08-10 NOTE — SUBJECTIVE & OBJECTIVE
Interval History: Started patient on 20 mg Lasix daily. Will monitor kidney function. Lactulose is being given to encourage BM. Till having some crampy abdominal pain with nausea that comes and goes.     Review of Systems   Constitutional:  Negative for chills and diaphoresis.   Respiratory:  Positive for shortness of breath. Negative for cough.         SOB still present but improved; down to 2L   Cardiovascular:  Negative for chest pain, palpitations and leg swelling.   Gastrointestinal:  Positive for abdominal distention, abdominal pain and nausea. Negative for blood in stool and vomiting.        Some crampy abdominal pain that comes and goes. Giving lactulose to encourage BM; nausea with abdominal pain    Abdomen is starting to distend again, likely due to ascites.        Genitourinary:  Negative for difficulty urinating, dysuria and hematuria.   Neurological:  Negative for dizziness and headaches.     Objective:     Vital Signs (Most Recent):  Temp: 98.4 °F (36.9 °C) (08/10/24 1129)  Pulse: 73 (08/10/24 1541)  Resp: 20 (08/10/24 1129)  BP: 106/72 (08/10/24 1129)  SpO2: 100 % (08/10/24 1541) Vital Signs (24h Range):  Temp:  [98.2 °F (36.8 °C)-98.8 °F (37.1 °C)] 98.4 °F (36.9 °C)  Pulse:  [71-82] 73  Resp:  [16-20] 20  SpO2:  [92 %-100 %] 100 %  BP: (106-135)/(60-83) 106/72     Weight: 99.2 kg (218 lb 11.1 oz)  Body mass index is 36.39 kg/m².    Intake/Output Summary (Last 24 hours) at 8/10/2024 1612  Last data filed at 8/10/2024 0511  Gross per 24 hour   Intake 200 ml   Output 350 ml   Net -150 ml         Physical Exam  Constitutional:       General: She is not in acute distress.     Appearance: She is obese. She is not ill-appearing.   HENT:      Head: Normocephalic and atraumatic.   Eyes:      General: No scleral icterus.  Cardiovascular:      Rate and Rhythm: Normal rate and regular rhythm.      Heart sounds: Murmur heard.   Pulmonary:      Effort: Pulmonary effort is normal.      Breath sounds: Normal breath  sounds.      Comments: Patient on 2 L nasal canula  Abdominal:      General: Bowel sounds are normal. There is distension.      Tenderness: There is abdominal tenderness. There is no guarding or rebound.      Comments: Abdominal swelling present; still improved since last paracentesis.        Musculoskeletal:      Right lower leg: No edema.      Left lower leg: No edema.      Comments: Trace bilateral lower extremity edema   Skin:     General: Skin is warm and dry.      Coloration: Skin is not jaundiced.   Neurological:      Mental Status: She is alert and oriented to person, place, and time.   Psychiatric:         Mood and Affect: Mood normal.         Behavior: Behavior normal.             Significant Labs: All pertinent labs within the past 24 hours have been reviewed.    Significant Imaging: I have reviewed all pertinent imaging results/findings within the past 24 hours.

## 2024-08-10 NOTE — ASSESSMENT & PLAN NOTE
Chronic, controlled. Latest blood pressure and vitals reviewed-     Temp:  [98.2 °F (36.8 °C)-98.8 °F (37.1 °C)]   Pulse:  [71-82]   Resp:  [16-20]   BP: (106-135)/(60-83)   SpO2:  [92 %-100 %] .   Home meds for hypertension were reviewed and noted below.   Hypertension Medications               furosemide (LASIX) 40 MG tablet Take 1 tablet (40 mg total) by mouth 2 (two) times daily. Resume as needed for edema until followup with your PCP.    lisinopriL (PRINIVIL,ZESTRIL) 40 MG tablet Take 1 tablet (40 mg total) by mouth once daily.    metoprolol succinate (TOPROL-XL) 100 MG 24 hr tablet Take 1 tablet (100 mg total) by mouth 2 (two) times daily.    NIFEdipine (PROCARDIA-XL) 60 MG (OSM) 24 hr tablet Take 1 tablet (60 mg total) by mouth once daily.            While in the hospital, will manage blood pressure as follows; Adjust home antihypertensive regimen as follows- Continue toprol for afib, hold other antihypertensives. Lasix only at 20 mg daily.     Will utilize p.r.n. blood pressure medication only if patient's blood pressure greater than 220/110 and she develops symptoms such as worsening chest pain or shortness of breath.

## 2024-08-11 LAB
ALBUMIN SERPL BCP-MCNC: 2.4 G/DL (ref 3.5–5.2)
ALP SERPL-CCNC: 202 U/L (ref 55–135)
ALT SERPL W/O P-5'-P-CCNC: 5 U/L (ref 10–44)
ANION GAP SERPL CALC-SCNC: 12 MMOL/L (ref 8–16)
AST SERPL-CCNC: 25 U/L (ref 10–40)
BASOPHILS # BLD AUTO: 0.06 K/UL (ref 0–0.2)
BASOPHILS NFR BLD: 0.7 % (ref 0–1.9)
BILIRUB SERPL-MCNC: 1 MG/DL (ref 0.1–1)
BUN SERPL-MCNC: 28 MG/DL (ref 6–20)
CALCIUM SERPL-MCNC: 9.8 MG/DL (ref 8.7–10.5)
CD3+CD4+ CELLS/CD3+CD8+ CLL BLD: ABNORMAL %
CHLORIDE SERPL-SCNC: 91 MMOL/L (ref 95–110)
CO2 SERPL-SCNC: 30 MMOL/L (ref 23–29)
CREAT SERPL-MCNC: 1.4 MG/DL (ref 0.5–1.4)
DIFFERENTIAL METHOD BLD: ABNORMAL
EOSINOPHIL # BLD AUTO: 0.3 K/UL (ref 0–0.5)
EOSINOPHIL NFR BLD: 3.3 % (ref 0–8)
ERYTHROCYTE [DISTWIDTH] IN BLOOD BY AUTOMATED COUNT: 14.8 % (ref 11.5–14.5)
EST. GFR  (NO RACE VARIABLE): 43.1 ML/MIN/1.73 M^2
GLUCOSE SERPL-MCNC: 76 MG/DL (ref 70–110)
HCT VFR BLD AUTO: 37.7 % (ref 37–48.5)
HGB BLD-MCNC: 11.4 G/DL (ref 12–16)
IMM GRANULOCYTES # BLD AUTO: 0.09 K/UL (ref 0–0.04)
IMM GRANULOCYTES NFR BLD AUTO: 1.1 % (ref 0–0.5)
LYMPHOCYTES # BLD AUTO: 0.8 K/UL (ref 1–4.8)
LYMPHOCYTES NFR BLD: 10.2 % (ref 18–48)
MAGNESIUM SERPL-MCNC: 2 MG/DL (ref 1.6–2.6)
MCH RBC QN AUTO: 27.3 PG (ref 27–31)
MCHC RBC AUTO-ENTMCNC: 30.2 G/DL (ref 32–36)
MCV RBC AUTO: 90 FL (ref 82–98)
MONOCYTES # BLD AUTO: 0.8 K/UL (ref 0.3–1)
MONOCYTES NFR BLD: 9.5 % (ref 4–15)
NEUTROPHILS # BLD AUTO: 6.1 K/UL (ref 1.8–7.7)
NEUTROPHILS NFR BLD: 75.2 % (ref 38–73)
NRBC BLD-RTO: 0 /100 WBC
PHOSPHATE SERPL-MCNC: 3.9 MG/DL (ref 2.7–4.5)
PLATELET # BLD AUTO: 305 K/UL (ref 150–450)
PMV BLD AUTO: 10 FL (ref 9.2–12.9)
POTASSIUM SERPL-SCNC: 4 MMOL/L (ref 3.5–5.1)
PROT SERPL-MCNC: 6.6 G/DL (ref 6–8.4)
RBC # BLD AUTO: 4.18 M/UL (ref 4–5.4)
SODIUM SERPL-SCNC: 133 MMOL/L (ref 136–145)
TB INDURATION 48 - 72 HR READ: ABNORMAL
TB SKIN TEST 48 - 72 HR READ: ABNORMAL
WBC # BLD AUTO: 8.14 K/UL (ref 3.9–12.7)

## 2024-08-11 PROCEDURE — 20600001 HC STEP DOWN PRIVATE ROOM

## 2024-08-11 PROCEDURE — 80053 COMPREHEN METABOLIC PANEL: CPT | Performed by: STUDENT IN AN ORGANIZED HEALTH CARE EDUCATION/TRAINING PROGRAM

## 2024-08-11 PROCEDURE — 94761 N-INVAS EAR/PLS OXIMETRY MLT: CPT

## 2024-08-11 PROCEDURE — 94799 UNLISTED PULMONARY SVC/PX: CPT | Mod: XB

## 2024-08-11 PROCEDURE — 25000003 PHARM REV CODE 250

## 2024-08-11 PROCEDURE — 84100 ASSAY OF PHOSPHORUS: CPT

## 2024-08-11 PROCEDURE — 94660 CPAP INITIATION&MGMT: CPT

## 2024-08-11 PROCEDURE — 99900035 HC TECH TIME PER 15 MIN (STAT)

## 2024-08-11 PROCEDURE — 85025 COMPLETE CBC W/AUTO DIFF WBC: CPT

## 2024-08-11 PROCEDURE — 83735 ASSAY OF MAGNESIUM: CPT

## 2024-08-11 RX ADMIN — SIMETHICONE 80 MG: 80 TABLET, CHEWABLE ORAL at 08:08

## 2024-08-11 RX ADMIN — LACTULOSE 10 G: 20 SOLUTION ORAL at 08:08

## 2024-08-11 RX ADMIN — Medication 3 MG: at 08:08

## 2024-08-11 RX ADMIN — ACETAMINOPHEN 650 MG: 325 TABLET ORAL at 05:08

## 2024-08-11 RX ADMIN — ALPRAZOLAM 0.25 MG: 0.25 TABLET ORAL at 02:08

## 2024-08-11 RX ADMIN — ACETAMINOPHEN 650 MG: 325 TABLET ORAL at 01:08

## 2024-08-11 RX ADMIN — METOPROLOL SUCCINATE 100 MG: 100 TABLET, EXTENDED RELEASE ORAL at 08:08

## 2024-08-11 RX ADMIN — DULOXETINE HYDROCHLORIDE 60 MG: 30 CAPSULE, DELAYED RELEASE ORAL at 08:08

## 2024-08-11 RX ADMIN — DICLOFENAC SODIUM 2 G: 10 GEL TOPICAL at 09:08

## 2024-08-11 RX ADMIN — PANTOPRAZOLE SODIUM 40 MG: 40 TABLET, DELAYED RELEASE ORAL at 08:08

## 2024-08-11 RX ADMIN — ALPRAZOLAM 0.25 MG: 0.25 TABLET ORAL at 01:08

## 2024-08-11 RX ADMIN — ONDANSETRON 8 MG: 8 TABLET, ORALLY DISINTEGRATING ORAL at 01:08

## 2024-08-11 NOTE — ASSESSMENT & PLAN NOTE
59F with CHF and cirrhosis admitted for volume overload likely secondary to cirrhosis. EGD 8/2021 with Portal hypertensive gastropathy. No varices.   Less likely cardiac etiology as patient is s/p RHC with normal biventricular filling pressures, borderline low CI/CO, and mild pre-capillary pulmonary HTN  Liver biopsy 8/5 with 4/4 fibrosis and congestive hepatopathy.   MRI A/P without concerns obvious etiology although noted 2.6 x 2.4 cm indeterminate left  adrenal nodule.  S/p x3 para 8/6    - Continue lasix 20mg daily  - Hold spironolactone 2/2 worsening renal function, resume when able  - Will require outpatient weekly paracentesis and hepatology follow up for liver transplant evaluation/TIPS

## 2024-08-11 NOTE — ASSESSMENT & PLAN NOTE
Patient's FSGs are controlled on current medication regimen.  Last A1c reviewed-   Lab Results   Component Value Date    HGBA1C 4.9 07/27/2024     Blood sugars are staying in the 80s-90s

## 2024-08-11 NOTE — PLAN OF CARE
Md notified that the patient's b/p was 101/72 md gave orders to hold metoprolol and lasix, and md put parameters on the medications. Patient tb skin test read and was negative. Bed in lowest position call light in reach camera in place. Plan of care ongoing.                      Problem: Adult Inpatient Plan of Care  Goal: Plan of Care Review  Outcome: Progressing  Goal: Patient-Specific Goal (Individualized)  Outcome: Progressing  Goal: Absence of Hospital-Acquired Illness or Injury  Outcome: Progressing  Goal: Optimal Comfort and Wellbeing  Outcome: Progressing  Goal: Readiness for Transition of Care  Outcome: Progressing     Problem: Bariatric Environmental Safety  Goal: Safety Maintained with Care  Outcome: Progressing     Problem: Diabetes Comorbidity  Goal: Blood Glucose Level Within Targeted Range  Outcome: Progressing     Problem: Skin Injury Risk Increased  Goal: Skin Health and Integrity  Outcome: Progressing     Problem: Wound  Goal: Optimal Coping  Outcome: Progressing  Goal: Optimal Functional Ability  Outcome: Progressing  Goal: Absence of Infection Signs and Symptoms  Outcome: Progressing  Goal: Improved Oral Intake  Outcome: Progressing  Goal: Optimal Pain Control and Function  Outcome: Progressing  Goal: Skin Health and Integrity  Outcome: Progressing  Goal: Optimal Wound Healing  Outcome: Progressing     Problem: Pain Acute  Goal: Optimal Pain Control and Function  Outcome: Progressing     Problem: Liver Failure  Goal: Optimal Coping with Liver Failure  Outcome: Progressing  Goal: Fluid and Electrolyte Balance  Outcome: Progressing  Goal: Optimal Gastrointestinal Function  Outcome: Progressing  Goal: Blood Glucose Level Within Target Range  Outcome: Progressing  Goal: Optimal Coagulation Function  Outcome: Progressing  Goal: Absence of Infection Signs and Symptoms  Outcome: Progressing  Goal: Optimal Neurologic Function  Outcome: Progressing  Goal: Improved Oral Intake  Outcome: Progressing  Goal:  Optimal Pain Control, Comfort and Function  Outcome: Progressing  Goal: Optimize Renal Function  Outcome: Progressing  Goal: Effective Oxygenation and Ventilation  Outcome: Progressing     Problem: Diarrhea  Goal: Effective Diarrhea Management  Outcome: Progressing

## 2024-08-11 NOTE — ASSESSMENT & PLAN NOTE
- Consider holding lasix if Scr continues to uptrend  - Strict I&Os and daily weights   - Gentle IVF  - Trend Cr  - Strict I&Os  - Avoid nephrotoxic agents (NSAIDs, ACEi/Arbs, aminoglycoside abx, zozyn, amphotericin, tenofovir)  - Renally adjust medications (metformin, gabapentin, cefepime, morphine)

## 2024-08-11 NOTE — SUBJECTIVE & OBJECTIVE
Interval History: NAEO. Patient tolerated reduced dose of lasix, will continue to trend renal function. Otherwise no complaints this AM. Stomach pain resolved with BM.     Review of Systems   Constitutional:  Negative for chills and diaphoresis.   Respiratory:  Negative for cough and shortness of breath.    Cardiovascular:  Negative for chest pain, palpitations and leg swelling.   Gastrointestinal:  Negative for abdominal distention, abdominal pain, blood in stool, nausea and vomiting.             Genitourinary:  Negative for difficulty urinating, dysuria and hematuria.   Neurological:  Negative for dizziness and headaches.     Objective:     Vital Signs (Most Recent):  Temp: 98.3 °F (36.8 °C) (08/11/24 1130)  Pulse: 68 (08/11/24 1130)  Resp: 20 (08/11/24 0800)  BP: 109/77 (08/11/24 1130)  SpO2: 98 % (08/11/24 0800) Vital Signs (24h Range):  Temp:  [97.7 °F (36.5 °C)-98.6 °F (37 °C)] 98.3 °F (36.8 °C)  Pulse:  [62-81] 68  Resp:  [14-22] 20  SpO2:  [93 %-100 %] 98 %  BP: (101-125)/(67-77) 109/77     Weight: 99.2 kg (218 lb 11.1 oz)  Body mass index is 36.39 kg/m².    Intake/Output Summary (Last 24 hours) at 8/11/2024 1327  Last data filed at 8/11/2024 0452  Gross per 24 hour   Intake --   Output 400 ml   Net -400 ml         Physical Exam  Constitutional:       General: She is not in acute distress.     Appearance: She is obese. She is not ill-appearing.   HENT:      Head: Normocephalic and atraumatic.   Eyes:      General: No scleral icterus.  Cardiovascular:      Rate and Rhythm: Normal rate and regular rhythm.      Heart sounds: Murmur heard.   Pulmonary:      Effort: Pulmonary effort is normal.      Breath sounds: Normal breath sounds.   Abdominal:      General: Bowel sounds are normal. There is distension.      Tenderness: There is no abdominal tenderness. There is no guarding or rebound.      Comments: Abdominal swelling present; still improved since last paracentesis.    Musculoskeletal:      Right lower leg:  No edema.      Left lower leg: No edema.   Skin:     General: Skin is warm and dry.      Coloration: Skin is not jaundiced.   Neurological:      Mental Status: She is alert and oriented to person, place, and time.   Psychiatric:         Mood and Affect: Mood normal.         Behavior: Behavior normal.             Significant Labs: All pertinent labs within the past 24 hours have been reviewed.    Significant Imaging: I have reviewed all pertinent imaging results/findings within the past 24 hours.

## 2024-08-11 NOTE — PLAN OF CARE
No acute events overnight.    Problem: Adult Inpatient Plan of Care  Goal: Plan of Care Review  Outcome: Progressing  Goal: Patient-Specific Goal (Individualized)  Outcome: Progressing  Goal: Absence of Hospital-Acquired Illness or Injury  Outcome: Progressing  Goal: Optimal Comfort and Wellbeing  Outcome: Progressing  Goal: Readiness for Transition of Care  Outcome: Progressing     Problem: Bariatric Environmental Safety  Goal: Safety Maintained with Care  Outcome: Progressing     Problem: Diabetes Comorbidity  Goal: Blood Glucose Level Within Targeted Range  Outcome: Progressing     Problem: Skin Injury Risk Increased  Goal: Skin Health and Integrity  Outcome: Progressing     Problem: Wound  Goal: Optimal Coping  Outcome: Progressing  Goal: Optimal Functional Ability  Outcome: Progressing  Goal: Absence of Infection Signs and Symptoms  Outcome: Progressing  Goal: Improved Oral Intake  Outcome: Progressing  Goal: Optimal Pain Control and Function  Outcome: Progressing  Goal: Skin Health and Integrity  Outcome: Progressing  Goal: Optimal Wound Healing  Outcome: Progressing     Problem: Pain Acute  Goal: Optimal Pain Control and Function  Outcome: Progressing     Problem: Liver Failure  Goal: Optimal Coping with Liver Failure  Outcome: Progressing  Goal: Fluid and Electrolyte Balance  Outcome: Progressing  Goal: Optimal Gastrointestinal Function  Outcome: Progressing  Goal: Blood Glucose Level Within Target Range  Outcome: Progressing  Goal: Optimal Coagulation Function  Outcome: Progressing  Goal: Absence of Infection Signs and Symptoms  Outcome: Progressing  Goal: Optimal Neurologic Function  Outcome: Progressing  Goal: Improved Oral Intake  Outcome: Progressing  Goal: Optimal Pain Control, Comfort and Function  Outcome: Progressing  Goal: Optimize Renal Function  Outcome: Progressing  Goal: Effective Oxygenation and Ventilation  Outcome: Progressing     Problem: Diarrhea  Goal: Effective Diarrhea  Management  Outcome: Progressing

## 2024-08-11 NOTE — PROGRESS NOTES
[facility] ([place] [id number])  [facility]  Progress Note    Patient Name: Vicky Alvarado  MRN: [id number]  Patient Class: IP- Inpatient   Admission Date: 7/26/2024  Length of Stay: 15 days  Attending Physician: Evin Wong, *  Primary Care Provider: Gordy Cordero MD        Subjective:     Principal Problem:Anasarca        HPI:  Patient is a 59F with CHF (EF 60%, G2DD), HTN, Afib (s/p watchman procedure), T2DM (prev. on tirzepatide) presents to the ED w/ minor fall and progressively worsening SOB. Notes she couldn't get up after her fall, came in in a wheelchair. Notes she has had SOB for the past month with exertion, though now it is with rest as well. Reports orthopnea during this time frame. States she does not have any pain with breathing, but does additionally endorse occasional, intermittent chest pain. Denies abdominal pain, fever, or chills. States she has early satiety. No prior admission for HF exacerbation. Reports experiencing b/l lower extremity edema previously, but denies prior abdominal swelling. Swelling precluding normal ambulation, using walker (previously for balance) to assist d.t weakness. Has not missed doses of home lasix or antihypertensive medications. No reduced urine output at home.     In ED, normotensive, no tachycardia, some tachypnea (RR 22-28), initially on NRB -> HFNC 12L -> BIPAP -> 6L. No desaturations recorded during transitions in O2 therapy (BIPAP primarily placed for pulmonary edema seen on CXR). CBC with normocytic anemia (Hb 10.6), no leukocytosis. CMP with hypokalemia K 2.9, Cr 1.8/BUN 27 (baseline appears to be 1-1.4), Alb 2.7, no LFT elevation. Mg 1.5. , Troponin normal. VBG 7.46 / 42. CT AP with cirrhotic liver morphology, questionable GB sludge/stones, large volume ascites. CXR with hypoventilatory changes, R>L perihilar infiltrates c/f CHF. EKG NSR.  Received K replacement, 100mg IV Lasix in ED.       Overview/Hospital  Course:  Patient admitted to [facility] for volume overload and SOB. Patient has had CHF exacerbation in the past but also has evidence of a cirrhosis requiring a paracentesis in the ED in which they removed 7 L of fluid, without evidence of SBP.  Abdominal CT shows a cirrhotic morphology of the liver as well as US/doppler with the addition of splenomegaly and portal HTN.  Protein studies of the ascitic fluid show increased protein leaning more towards a cardiac etiology, however right heart cath performed 8/5/24 did not show clear evidence of a cardiac component driving her ascites. TTE on (7/29/24) showed intermediate diastolic dysfunction with EF 60-65%, minor aortic stenosis, and moderate pulmonary hypertension with estimated pulmonary artery systolic pressure is 54 mmHg.  Liver biopsy performed 8/5/24 showed cirhosis likley due to a congestive hepatopathy. Hepatology believes there may be another underlying cause behind the cirrhosis and  recommend further workup of other causes. Cytology fluid has been negative for malignant cells thus far. MRI of the abdomen and pelvis showed a 2.6 x 2.4 cm left adrenal nodule. PETH score was mildly positive; advised to no longer drink alcohol.     Patient medically stable for discharge, will require weekly paracentesis, CMP/CBC, and outpatient hepatology evaluation for transplant and TIPS consideration. Pending placement for SNF.     Interval History: NAEO. Patient tolerated reduced dose of lasix, will continue to trend renal function. Otherwise no complaints this AM. Stomach pain resolved with BM.     Review of Systems   Constitutional:  Negative for chills and diaphoresis.   Respiratory:  Negative for cough and shortness of breath.    Cardiovascular:  Negative for chest pain, palpitations and leg swelling.   Gastrointestinal:  Negative for abdominal distention, abdominal pain, blood in stool, nausea and vomiting.             Genitourinary:  Negative for difficulty urinating,  dysuria and hematuria.   Neurological:  Negative for dizziness and headaches.     Objective:     Vital Signs (Most Recent):  Temp: 98.3 °F (36.8 °C) (08/11/24 1130)  Pulse: 68 (08/11/24 1130)  Resp: 20 (08/11/24 0800)  BP: 109/77 (08/11/24 1130)  SpO2: 98 % (08/11/24 0800) Vital Signs (24h Range):  Temp:  [97.7 °F (36.5 °C)-98.6 °F (37 °C)] 98.3 °F (36.8 °C)  Pulse:  [62-81] 68  Resp:  [14-22] 20  SpO2:  [93 %-100 %] 98 %  BP: (101-125)/(67-77) 109/77     Weight: 99.2 kg (218 lb 11.1 oz)  Body mass index is 36.39 kg/m².    Intake/Output Summary (Last 24 hours) at 8/11/2024 1327  Last data filed at 8/11/2024 0452  Gross per 24 hour   Intake --   Output 400 ml   Net -400 ml         Physical Exam  Constitutional:       General: She is not in acute distress.     Appearance: She is obese. She is not ill-appearing.   HENT:      Head: Normocephalic and atraumatic.   Eyes:      General: No scleral icterus.  Cardiovascular:      Rate and Rhythm: Normal rate and regular rhythm.      Heart sounds: Murmur heard.   Pulmonary:      Effort: Pulmonary effort is normal.      Breath sounds: Normal breath sounds.   Abdominal:      General: Bowel sounds are normal. There is distension.      Tenderness: There is no abdominal tenderness. There is no guarding or rebound.      Comments: Abdominal swelling present; still improved since last paracentesis.    Musculoskeletal:      Right lower leg: No edema.      Left lower leg: No edema.   Skin:     General: Skin is warm and dry.      Coloration: Skin is not jaundiced.   Neurological:      Mental Status: She is alert and oriented to person, place, and time.   Psychiatric:         Mood and Affect: Mood normal.         Behavior: Behavior normal.             Significant Labs: All pertinent labs within the past 24 hours have been reviewed.    Significant Imaging: I have reviewed all pertinent imaging results/findings within the past 24 hours.    Assessment/Plan:      * Anasarca  59F with CHF and  cirrhosis admitted for volume overload likely secondary to cirrhosis. EGD 8/2021 with Portal hypertensive gastropathy. No varices.   Less likely cardiac etiology as patient is s/p RHC with normal biventricular filling pressures, borderline low CI/CO, and mild pre-capillary pulmonary HTN  Liver biopsy 8/5 with 4/4 fibrosis and congestive hepatopathy.   MRI A/P without concerns obvious etiology although noted 2.6 x 2.4 cm indeterminate left  adrenal nodule.  S/p x3 para 8/6    - Continue lasix 20mg daily  - Hold spironolactone 2/2 worsening renal function, resume when able  - Will require outpatient weekly paracentesis and hepatology follow up for liver transplant evaluation/TIPS    Acute on chronic diastolic heart failure  Cardiorenal syndrome  Acute HF exacerbation of uncertain etiology (no missed doses of lasix). Last TTE with EF 60% and G2DD. Concern that she may have new R-sided HF given anasarca (large volume ascites) versus now concomitant MASH cirrhosis compounding volume overloaded state and therefore needs higher diuresis at baseline. Suspect JHONATAN d.t overload - CRS.     Total 17L removed via para + 8L UOP  TTE with intermediate diastolic dysfunction with EF 60-65%, minor aortic stenosis, and moderate pulmonary hypertension with estimated pulmonary artery systolic pressure is 54 mmHg.    - Intravascularly depleted, holding lasix 80mg IV and spironolactone 50mg  - RFP BID  - BIPAP qhs and during naps     - Fluid restriction at 1200 cc with strict I/Os and daily weights   - Check Electrolytes, keep Mag >2 & K+ >4  - Ambulate as tolerated      Stage 3b chronic kidney disease  - Consider holding lasix if Scr continues to uptrend  - Strict I&Os and daily weights   - Gentle IVF  - Trend Cr  - Strict I&Os  - Avoid nephrotoxic agents (NSAIDs, ACEi/Arbs, aminoglycoside abx, zozyn, amphotericin, tenofovir)  - Renally adjust medications (metformin, gabapentin, cefepime, morphine)    NAFLD (nonalcoholic fatty liver  disease)  Seen by Dr. Saba with hepatology for previous chronic mild elevation in LFT, determined most likely NADLF. Now with cirrhotic morphology on CT. Previous EGD with portal gastropathy. LFT normal on admit. 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     - spironolactone 25mg and Lasix 80 BID holding diuretics due to dip in kidney function      -IR consulted; performed liver biopsy; results showed cirrhosis with congestive hepatopathy; IR recommends further workup.      Volume overload  See anasarca    Type 2 diabetes mellitus with diabetic polyneuropathy, without long-term current use of insulin  Patient's FSGs are controlled on current medication regimen.  Last A1c reviewed-   Lab Results   Component Value Date    HGBA1C 4.9 07/27/2024     Blood sugars are staying in the 80s-90s      Atrial Fibrillation (paroxysmal)  Patient with Paroxysmal (<7 days) atrial fibrillation which is controlled currently with Beta Blocker (Metoprolol succinate 100 mg BID) Patient is currently in sinus rhythm.[id number] Score: 3. Anticoagulation not indicated due to has Watchman .    Essential hypertension  Continue home toprol      VTE Risk Mitigation (From admission, onward)           Ordered     Place AL hose  Until discontinued         08/10/24 1104     IP VTE HIGH RISK PATIENT  Once         07/27/24 0351     Place sequential compression device  Until discontinued         07/27/24 0351                    Discharge Planning   RAYO: 8/12/2024     Code Status: Full Code   Is the patient medically ready for discharge?:     Reason for patient still in hospital (select all that apply): Treatment and Pending disposition  Discharge Plan A: Skilled Nursing Facility   Discharge Delays: None known at this time              Delmar Ross MD  Department of Hospital Medicine   [facility] ([place])

## 2024-08-12 PROBLEM — E87.1 HYPONATREMIA: Status: ACTIVE | Noted: 2024-08-12

## 2024-08-12 LAB
ALBUMIN SERPL BCP-MCNC: 2.3 G/DL (ref 3.5–5.2)
ALP SERPL-CCNC: 196 U/L (ref 55–135)
ALT SERPL W/O P-5'-P-CCNC: <5 U/L (ref 10–44)
ANION GAP SERPL CALC-SCNC: 11 MMOL/L (ref 8–16)
AST SERPL-CCNC: 25 U/L (ref 10–40)
BILIRUB SERPL-MCNC: 0.9 MG/DL (ref 0.1–1)
BUN SERPL-MCNC: 28 MG/DL (ref 6–20)
CALCIUM SERPL-MCNC: 9.4 MG/DL (ref 8.7–10.5)
CHLORIDE SERPL-SCNC: 93 MMOL/L (ref 95–110)
CO2 SERPL-SCNC: 29 MMOL/L (ref 23–29)
CREAT SERPL-MCNC: 1.5 MG/DL (ref 0.5–1.4)
EST. GFR  (NO RACE VARIABLE): 39.6 ML/MIN/1.73 M^2
GLUCOSE SERPL-MCNC: 93 MG/DL (ref 70–110)
MAGNESIUM SERPL-MCNC: 1.9 MG/DL (ref 1.6–2.6)
PHOSPHATE SERPL-MCNC: 3.8 MG/DL (ref 2.7–4.5)
POTASSIUM SERPL-SCNC: 4.2 MMOL/L (ref 3.5–5.1)
PROT SERPL-MCNC: 6.2 G/DL (ref 6–8.4)
SODIUM SERPL-SCNC: 133 MMOL/L (ref 136–145)

## 2024-08-12 PROCEDURE — 97535 SELF CARE MNGMENT TRAINING: CPT | Mod: CO

## 2024-08-12 PROCEDURE — 25000003 PHARM REV CODE 250

## 2024-08-12 PROCEDURE — 99900035 HC TECH TIME PER 15 MIN (STAT)

## 2024-08-12 PROCEDURE — 94761 N-INVAS EAR/PLS OXIMETRY MLT: CPT

## 2024-08-12 PROCEDURE — 94660 CPAP INITIATION&MGMT: CPT

## 2024-08-12 PROCEDURE — 20600001 HC STEP DOWN PRIVATE ROOM

## 2024-08-12 PROCEDURE — 97116 GAIT TRAINING THERAPY: CPT | Mod: CQ

## 2024-08-12 PROCEDURE — 83735 ASSAY OF MAGNESIUM: CPT

## 2024-08-12 PROCEDURE — 36415 COLL VENOUS BLD VENIPUNCTURE: CPT | Performed by: STUDENT IN AN ORGANIZED HEALTH CARE EDUCATION/TRAINING PROGRAM

## 2024-08-12 PROCEDURE — 97530 THERAPEUTIC ACTIVITIES: CPT | Mod: CO

## 2024-08-12 PROCEDURE — 84100 ASSAY OF PHOSPHORUS: CPT

## 2024-08-12 PROCEDURE — 97110 THERAPEUTIC EXERCISES: CPT | Mod: CQ

## 2024-08-12 PROCEDURE — 80053 COMPREHEN METABOLIC PANEL: CPT | Performed by: STUDENT IN AN ORGANIZED HEALTH CARE EDUCATION/TRAINING PROGRAM

## 2024-08-12 RX ORDER — ACETAMINOPHEN 500 MG
1000 TABLET ORAL NIGHTLY
Start: 2024-08-12

## 2024-08-12 RX ORDER — ALPRAZOLAM 0.5 MG/1
0.5 TABLET ORAL DAILY PRN
Qty: 7 TABLET | Refills: 0 | Status: SHIPPED | OUTPATIENT
Start: 2024-08-12

## 2024-08-12 RX ORDER — FUROSEMIDE 20 MG/1
20 TABLET ORAL DAILY
Qty: 30 TABLET | Refills: 3 | Status: CANCELLED | OUTPATIENT
Start: 2024-08-13

## 2024-08-12 RX ORDER — LACTULOSE 10 G/15ML
10 SOLUTION ORAL; RECTAL 2 TIMES DAILY
Qty: 900 ML | Refills: 2 | Status: SHIPPED | OUTPATIENT
Start: 2024-08-12

## 2024-08-12 RX ORDER — ALPRAZOLAM 0.5 MG/1
0.5 TABLET ORAL DAILY PRN
Start: 2024-08-12 | End: 2024-08-12

## 2024-08-12 RX ORDER — DULOXETIN HYDROCHLORIDE 60 MG/1
60 CAPSULE, DELAYED RELEASE ORAL DAILY
Qty: 180 CAPSULE | Refills: 3 | Status: SHIPPED | OUTPATIENT
Start: 2024-08-12

## 2024-08-12 RX ADMIN — METOPROLOL SUCCINATE 100 MG: 100 TABLET, EXTENDED RELEASE ORAL at 09:08

## 2024-08-12 RX ADMIN — ACETAMINOPHEN 650 MG: 325 TABLET ORAL at 02:08

## 2024-08-12 RX ADMIN — ACETAMINOPHEN 650 MG: 325 TABLET ORAL at 09:08

## 2024-08-12 RX ADMIN — METOPROLOL SUCCINATE 100 MG: 100 TABLET, EXTENDED RELEASE ORAL at 08:08

## 2024-08-12 RX ADMIN — SIMETHICONE 80 MG: 80 TABLET, CHEWABLE ORAL at 08:08

## 2024-08-12 RX ADMIN — DULOXETINE HYDROCHLORIDE 60 MG: 30 CAPSULE, DELAYED RELEASE ORAL at 08:08

## 2024-08-12 RX ADMIN — Medication 3 MG: at 09:08

## 2024-08-12 RX ADMIN — ALPRAZOLAM 0.25 MG: 0.25 TABLET ORAL at 02:08

## 2024-08-12 RX ADMIN — LACTULOSE 10 G: 20 SOLUTION ORAL at 08:08

## 2024-08-12 RX ADMIN — PANTOPRAZOLE SODIUM 40 MG: 40 TABLET, DELAYED RELEASE ORAL at 08:08

## 2024-08-12 RX ADMIN — FUROSEMIDE 20 MG: 20 TABLET ORAL at 08:08

## 2024-08-12 NOTE — PROGRESS NOTES
[facility] - Stepdown Flex ([place] [id number])  [facility]  Progress Note    Patient Name: Vicky Alvarado  MRN: [id number]  Patient Class: IP- Inpatient   Admission Date: 7/26/2024  Length of Stay: 16 days  Attending Physician: Monster Cruz MD  Primary Care Provider: Gordy Cordero MD        Subjective:     Principal Problem:Anasarca        HPI:  Patient is a 59F with CHF (EF 60%, G2DD), HTN, Afib (s/p watchman procedure), T2DM (prev. on tirzepatide) presents to the ED w/ minor fall and progressively worsening SOB. Notes she couldn't get up after her fall, came in in a wheelchair. Notes she has had SOB for the past month with exertion, though now it is with rest as well. Reports orthopnea during this time frame. States she does not have any pain with breathing, but does additionally endorse occasional, intermittent chest pain. Denies abdominal pain, fever, or chills. States she has early satiety. No prior admission for HF exacerbation. Reports experiencing b/l lower extremity edema previously, but denies prior abdominal swelling. Swelling precluding normal ambulation, using walker (previously for balance) to assist d.t weakness. Has not missed doses of home lasix or antihypertensive medications. No reduced urine output at home.     In ED, normotensive, no tachycardia, some tachypnea (RR 22-28), initially on NRB -> HFNC 12L -> BIPAP -> 6L. No desaturations recorded during transitions in O2 therapy (BIPAP primarily placed for pulmonary edema seen on CXR). CBC with normocytic anemia (Hb 10.6), no leukocytosis. CMP with hypokalemia K 2.9, Cr 1.8/BUN 27 (baseline appears to be 1-1.4), Alb 2.7, no LFT elevation. Mg 1.5. , Troponin normal. VBG 7.46 / 42. CT AP with cirrhotic liver morphology, questionable GB sludge/stones, large volume ascites. CXR with hypoventilatory changes, R>L perihilar infiltrates c/f CHF. EKG NSR.  Received K replacement, 100mg IV Lasix in ED.       Overview/Hospital  Course:  Patient admitted to [facility] for volume overload and SOB. Patient has had CHF exacerbation in the past but also has evidence of a cirrhosis requiring a paracentesis in the ED in which they removed 7 L of fluid, without evidence of SBP.  Abdominal CT shows a cirrhotic morphology of the liver as well as US/doppler with the addition of splenomegaly and portal HTN.  Protein studies of the ascitic fluid show increased protein leaning more towards a cardiac etiology, however right heart cath performed 8/5/24 did not show clear evidence of a cardiac component driving her ascites. TTE on (7/29/24) showed intermediate diastolic dysfunction with EF 60-65%, minor aortic stenosis, and moderate pulmonary hypertension with estimated pulmonary artery systolic pressure is 54 mmHg.  Liver biopsy performed 8/5/24 showed cirhosis likley due to a congestive hepatopathy. Hepatology believes there may be another underlying cause behind the cirrhosis and  recommend further workup of other causes. Cytology fluid has been negative for malignant cells thus far. MRI of the abdomen and pelvis showed a 2.6 x 2.4 cm left adrenal nodule. PETH score was mildly positive; advised to no longer drink alcohol.     Patient medically stable for discharge, will require weekly paracentesis, CMP/CBC, and outpatient hepatology evaluation for transplant and TIPS consideration. Pending placement for SNF.     No new subjective & objective note has been filed under this hospital service since the last note was generated.      Assessment/Plan:      * Anasarca  59F with CHF and cirrhosis admitted for volume overload likely secondary to cirrhosis. EGD 8/2021 with Portal hypertensive gastropathy. No varices.   Less likely cardiac etiology as patient is s/p RHC with normal biventricular filling pressures, borderline low CI/CO, and mild pre-capillary pulmonary HTN  Liver biopsy 8/5 with 4/4 fibrosis and congestive hepatopathy.   MRI A/P without concerns obvious  etiology although noted 2.6 x 2.4 cm indeterminate left  adrenal nodule.  S/p x3 para 8/6    - lasix 20mg prn for 2 pounds weight gain in a day.   - Hold spironolactone 2/2 worsening renal function  - Will require outpatient weekly paracentesis and hepatology follow up for liver transplant evaluation/TIPS    Acute on chronic diastolic heart failure  Cardiorenal syndrome  Acute HF exacerbation of uncertain etiology (no missed doses of lasix). Last TTE with EF 60% and G2DD. Concern that she may have new R-sided HF given anasarca (large volume ascites) versus now concomitant MASH cirrhosis compounding volume overloaded state and therefore needs higher diuresis at baseline. Suspect JHONATAN d.t overload - CRS.     Total 17L removed via para + 8L UOP  TTE with intermediate diastolic dysfunction with EF 60-65%, minor aortic stenosis, and moderate pulmonary hypertension with estimated pulmonary artery systolic pressure is 54 mmHg.    - Intravascularly depleted, holding lasix 80mg IV and spironolactone 50mg  - RFP BID  - BIPAP qhs and during naps     - Fluid restriction at 1200 cc with strict I/Os and daily weights   - Check Electrolytes, keep Mag >2 & K+ >4  - Ambulate as tolerated      Stage 3b chronic kidney disease  - Consider holding lasix if Scr continues to uptrend  - Strict I&Os and daily weights   - Gentle IVF  - Trend Cr  - Strict I&Os  - Avoid nephrotoxic agents (NSAIDs, ACEi/Arbs, aminoglycoside abx, zozyn, amphotericin, tenofovir)  - Renally adjust medications (metformin, gabapentin, cefepime, morphine)    NAFLD (nonalcoholic fatty liver disease)  Seen by Dr. Saba with hepatology for previous chronic mild elevation in LFT, determined most likely NADLF. Now with cirrhotic morphology on CT. Previous EGD with portal gastropathy. LFT normal on admit. 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     - spironolactone 25mg and Lasix 80 BID holding diuretics due to dip in kidney  function      -IR consulted; performed liver biopsy; results showed cirrhosis with congestive hepatopathy; IR recommends further workup.      Volume overload  See anasarca    Type 2 diabetes mellitus with diabetic polyneuropathy, without long-term current use of insulin  Patient's FSGs are controlled on current medication regimen.  Last A1c reviewed-   Lab Results   Component Value Date    HGBA1C 4.9 07/27/2024     Blood sugars are staying in the 80s-90s      Atrial Fibrillation (paroxysmal)  Patient with Paroxysmal (<7 days) atrial fibrillation which is controlled currently with Beta Blocker (Metoprolol succinate 100 mg BID) Patient is currently in sinus rhythm.[id number] Score: 3. Anticoagulation not indicated due to has Watchman .    Essential hypertension  Continue home toprol      VTE Risk Mitigation (From admission, onward)           Ordered     Place AL hose  Until discontinued         08/10/24 1104     IP VTE HIGH RISK PATIENT  Once         07/27/24 0351     Place sequential compression device  Until discontinued         07/27/24 0351                    Discharge Planning   RAYO: 8/12/2024     Code Status: Full Code   Is the patient medically ready for discharge?:     Reason for patient still in hospital (select all that apply): Pending disposition  Discharge Plan A: Skilled Nursing Facility   Discharge Delays: None known at this time              Brennon Nunez MD  Department of Hospital Medicine   [facility] - Stepdown Flex ([place])

## 2024-08-12 NOTE — PLAN OF CARE
CHRISTOPHERW unable to schedule the Hepatology hospital follow up. A message was sent by Cleo to the clinic requesting an appointment on the patients behalf. I added this information to the AVS as a reminder for the patient.

## 2024-08-12 NOTE — ASSESSMENT & PLAN NOTE
59F with CHF and cirrhosis admitted for volume overload likely secondary to cirrhosis. EGD 8/2021 with Portal hypertensive gastropathy. No varices.   Less likely cardiac etiology as patient is s/p RHC with normal biventricular filling pressures, borderline low CI/CO, and mild pre-capillary pulmonary HTN  Liver biopsy 8/5 with 4/4 fibrosis and congestive hepatopathy.   MRI A/P without concerns obvious etiology although noted 2.6 x 2.4 cm indeterminate left  adrenal nodule.  S/p x3 para 8/6    - lasix 20mg prn for 2 pounds weight gain in a day.   - Hold spironolactone 2/2 worsening renal function  - Will require outpatient weekly paracentesis and hepatology follow up for liver transplant evaluation/TIPS

## 2024-08-12 NOTE — PT/OT/SLP PROGRESS
Occupational Therapy   Treatment    Name: Vicky Alvarado  MRN: [id number]  Admitting Diagnosis:  Anasarca  7 Days Post-Op    Recommendations:     Discharge Recommendations: Moderate Intensity Therapy  Discharge Equipment Recommendations:  to be determined by next level of care  Barriers to discharge:  Decreased caregiver support, Other (Comment) (patient requires increased level of assistance)    Assessment:     Vicky Alvarado is a 59 y.o. female with a medical diagnosis of Anasarca.  She presents with the fallowing performance deficits affecting function are weakness, impaired endurance, impaired self care skills, impaired functional mobility, gait instability, impaired balance, pain, impaired cardiopulmonary response to activity, decreased safety awareness, decreased lower extremity function, decreased upper extremity function. Patient agreeable to tx session, patient is demonstrating progress with functional bed mobility, sitting balance, and self-care task, however; patient continues to have limited activity tolerance due to pain and weakness from recent surgical procedure. Patient also was difficulty performing stand pivot transfer and taking steps 2/2 to increased B knee and foot pain and also patient becomes afraid and noticed with increased anxiety hindering patient from progressing with mobility task.  Patient would benefit from Moderate intensity therapy intervention to address over all functional decline with mobility task, endurance, and ADLs in order to return to PLOF.     Rehab Prognosis:  Good; patient would benefit from acute skilled OT services to address these deficits and reach maximum level of function.       Plan:     Patient to be seen 4 x/week to address the above listed problems via self-care/home management, therapeutic activities, therapeutic exercises  Plan of Care Expires: 08/29/24  Plan of Care Reviewed with: patient    Subjective     Chief Complaint: pain   Patient/Family  Comments/goals: to return to PLOF   Pain/Comfort:  Pain Rating 1:  (patient did not rate)  Location - Side 1: Bilateral  Location - Orientation 1: generalized  Location 1: foot  Pain Addressed 1: Reposition, Distraction  Pain Rating Post-Intervention 1:  (patient did not rate)    Objective:     Communicated with: Nurse prior to session.  Patient found HOB elevated with  (no active lines) upon OT entry to room.  Co-treated with PT due to patient complexity deficits requiring two skilled therapist to appropriately and safely mobilized patient while facilitating functional task in addition to accommodating for patient's activity tolerance.    General Precautions: Standard, fall    Orthopedic Precautions:N/A  Braces: N/A  Respiratory Status: Room air     Occupational Performance:     Bed Mobility:    Patient completed Rolling/Turning to Right with stand by assistance  Patient completed Scooting anteriorly to EOB with stand by assistance  Patient completed Scooting to HOB with maximal assistance of 2 persons   Patient completed Supine to Sit with minimum assistance  Patient completed Sit to Supine with maximal assistance of 2 persons for trunk/ B LE mgmt     Functional Mobility/Transfers:  Patient completed Sit <> Stand Transfer with maximal assistance  with  no assistive device and hand-held assist   Patient completed Bed <> BSC Transfer using Stand Pivot technique with maximal assistance with no assistive device and hand-held assist  Patient completed BSC<>Chair with Max A   Patient completed BSC <> Bed Stand Pivot technique with maximal assistance of 2 persons with no assistive device and hand-held assist  Functional Mobility: deferred mobility task due to safety concerns     Activities of Daily Living:  Toileting: supervision for pericare hygiene task     [facility] 6 Click ADL: 17    Treatment & Education:  Discussed OT POC and progress  Educated patient on the importance to continue to perform exercises in order to  reduce stiffness and promote joint mobility and blood flow  Addressed patient's questions and concerns within CHRISTIAN scope of practice      Patient left HOB elevated with all lines intact, call button in reach, and PCT present    GOALS:   Multidisciplinary Problems       Occupational Therapy Goals          Problem: Occupational Therapy    Goal Priority Disciplines Outcome Interventions   Occupational Therapy Goal     OT, PT/OT Progressing    Description: Goals to be met by: 8/5/2024     Patient will increase functional independence with ADLs by performing:    UE Dressing with Set-up Assistance.  LE Dressing with Minimal Assistance.  Grooming while standing at sink with Supervision.  Toileting from toilet with Supervision for hygiene and clothing management.   Toilet transfer to toilet with Supervision.                         Time Tracking:     OT Date of Treatment: 08/12/24  OT Start Time: 1141 2nd session Start time: 1336  OT Stop Time: 1208 2nd session Stop time: 1346  OT Total Time (min): 27 min+10 mins=37 mins     Billable Minutes:Self Care/Home Management 17  Therapeutic Activity 20    OT/GASPER: GASPER     Number of GASPER visits since last OT visit: 1    8/12/2024

## 2024-08-12 NOTE — PLAN OF CARE
Patient aao x's 4. Patient participated in pt/ot patient got up to chair with two person assist ate her lunch in chair  and used bedside commode. Lasix was d/c and paracentesis scheduled. Safety maintained call light in reach and plan of care ongoing.            Problem: Adult Inpatient Plan of Care  Goal: Plan of Care Review  Outcome: Progressing  Goal: Patient-Specific Goal (Individualized)  Outcome: Progressing  Goal: Absence of Hospital-Acquired Illness or Injury  Outcome: Progressing  Goal: Optimal Comfort and Wellbeing  Outcome: Progressing  Goal: Readiness for Transition of Care  Outcome: Progressing     Problem: Bariatric Environmental Safety  Goal: Safety Maintained with Care  Outcome: Progressing     Problem: Diabetes Comorbidity  Goal: Blood Glucose Level Within Targeted Range  Outcome: Progressing     Problem: Skin Injury Risk Increased  Goal: Skin Health and Integrity  Outcome: Progressing     Problem: Wound  Goal: Optimal Coping  Outcome: Progressing  Goal: Optimal Functional Ability  Outcome: Progressing  Goal: Absence of Infection Signs and Symptoms  Outcome: Progressing  Goal: Improved Oral Intake  Outcome: Progressing  Goal: Optimal Pain Control and Function  Outcome: Progressing  Goal: Skin Health and Integrity  Outcome: Progressing  Goal: Optimal Wound Healing  Outcome: Progressing     Problem: Pain Acute  Goal: Optimal Pain Control and Function  Outcome: Progressing     Problem: Liver Failure  Goal: Optimal Coping with Liver Failure  Outcome: Progressing  Goal: Fluid and Electrolyte Balance  Outcome: Progressing  Goal: Optimal Gastrointestinal Function  Outcome: Progressing  Goal: Blood Glucose Level Within Target Range  Outcome: Progressing  Goal: Optimal Coagulation Function  Outcome: Progressing  Goal: Absence of Infection Signs and Symptoms  Outcome: Progressing  Goal: Optimal Neurologic Function  Outcome: Progressing  Goal: Improved Oral Intake  Outcome: Progressing  Goal: Optimal Pain  Control, Comfort and Function  Outcome: Progressing  Goal: Optimize Renal Function  Outcome: Progressing  Goal: Effective Oxygenation and Ventilation  Outcome: Progressing     Problem: Diarrhea  Goal: Effective Diarrhea Management  Outcome: Progressing

## 2024-08-12 NOTE — PLAN OF CARE
Discharge Plan A and Plan B have been determined by review of patient's clinical status, future medical and therapeutic needs, and coverage/benefits for post-acute care in coordination with multidisciplinary team members.        08/12/24 0831   Discharge Reassessment   Assessment Type Discharge Planning Reassessment   Did the patient's condition or plan change since previous assessment? No   Discharge Plan discussed with: Patient;Sibling   Name(s) and Number(s) Zully Arita (Sister)  [phone] (Mobile)   Communicated RAYO with patient/caregiver Yes   Discharge Plan A Skilled Nursing Facility   DME Needed Upon Discharge  other (see comments)  (TBD)   Transition of Care Barriers Mobility   Post-Acute Status   Post-Acute Authorization Placement   Post-Acute Placement Status Set-up Complete/Auth obtained  (MEDICAID - [employer] [place])   Patient choice form signed by patient/caregiver List with quality metrics by geographic area provided;List from CMS Compare;List from System Post-Acute Care   Discharge Delays None known at this time     CM met with patient and left a VM with the patients Zully brown   to discuss discharge planning.  Patients plan is to  dc to SNF.  MEDICA[employer] - [employer] [place]  has approved SNF services.   RAYO:  8/12/24      Discharge Recommendations:  moderate intensity     Discharge Equipment Recommendations:  to be determined by next level of care     Barriers to discharge: Decreased caregiver support, Other (Comment) (patient requires increased level of assistance)     12:29 pm  CM sent a secure chat to follow up if patient will be discharged today to SNF.  Patient spoke with patient and patients wants her sister to complete paperwork. CM emailed both Zully Arita (sister) and Elinor Elmore at [facility] [phone]     3:01 pm  CM spoke with Elinor at [facility] and Elinor has not heard from the patients Zully brown.  CM called and left a VM message  with Zully    3:35 pm  Zully Arita returned call and is presently completing the admit paperwork    3:44 pm  CM sent hard script for xanax via [facility]    4:08 pm  CM spoke with Elinor and the sister Zully has opened the admit paperwork, but has not completed. The patient will dc tomorrow.  CM updated the medical team    Debra Ga RN  Case Management  [facility]  [phone]

## 2024-08-12 NOTE — PT/OT/SLP PROGRESS
Physical Therapy Treatment  Co-treatment with OT due to acuity of condition, level of skilled assist needed for assessment of safety with mobility and potential of not tolerating a second treatment session.     Patient Name:  Vicky Alvarado   MRN:  [id number]    Recommendations:     Discharge Recommendations: Moderate Intensity Therapy  Discharge Equipment Recommendations: to be determined by next level of care  Barriers to discharge: None    Assessment:     Vicky Alvarado is a 59 y.o. female admitted with a medical diagnosis of Anasarca.  She presents with the following impairments/functional limitations: weakness, impaired endurance, impaired self care skills, impaired functional mobility, gait instability, pain, decreased safety awareness, decreased lower extremity function, decreased upper extremity function, impaired cardiopulmonary response to activity Pt tolerated treatment session well today. Pt still requiring assistance for bed mobility, transfers and gait training. Pt was sandie to take a few steps during today's session, yet limited due to pain. Patient remains appropriate for continued skilled services within the acute environment and goals remain appropriate.   .    Rehab Prognosis: Good; patient would benefit from acute skilled PT services to address these deficits and reach maximum level of function.    Recent Surgery: Procedure(s) (LRB):  INSERTION, CATHETER, RIGHT HEART (Right) 7 Days Post-Op    Plan:     During this hospitalization, patient to be seen 4 x/week to address the identified rehab impairments via gait training, therapeutic activities, therapeutic exercises, neuromuscular re-education and progress toward the following goals:    Plan of Care Expires:  08/29/24    Subjective     Chief Complaint: B foot pain with ambulation   Patient/Family Comments/goals: Pt agreeable to PT   Pain/Comfort:  Pain Rating 1:  (not rated)  Location - Side 1: Bilateral  Location - Orientation 1:  generalized  Location 1: foot  Pain Addressed 1: Reposition, Distraction  Pain Rating Post-Intervention 1:  (not rated)      Objective:     Communicated with Rn prior to session.  Patient found supine with  (no active lines) upon PT entry to room.     General Precautions: Standard, fall  Orthopedic Precautions: N/A  Braces: N/A  Respiratory Status: Room air     Functional Mobility:  Bed Mobility:     Rolling Right: stand by assistance  Scooting: stand by assistance  Supine to Sit: minimum assistance  EOB sitting: CGA    Transfers:     Sit to Stand x 2:  maximal assistance with no AD  Bed > commode > chair: MaxA with no AD   Gait: ~ 3 small steps during transfer MaxA with no AD   Antalgic gait, difficulty advancing LE's, required assistance for WS, instability, no LOB noted     Pt performed 10 repetitions of seated B LE exercises consisting of: Marching, LAQ, ABD/ADD, heel raises, and toe raises.         AM-PAC 6 CLICK MOBILITY  Turning over in bed (including adjusting bedclothes, sheets and blankets)?: 3  Sitting down on and standing up from a chair with arms (e.g., wheelchair, bedside commode, etc.): 2  Moving from lying on back to sitting on the side of the bed?: 3  Moving to and from a bed to a chair (including a wheelchair)?: 2  Need to walk in hospital room?: 2  Climbing 3-5 steps with a railing?: 1  Basic Mobility Total Score: 13       Treatment & Education:  Therapist provided instruction and educated for safety during transfers and gait training. As well as proper body mechanics, energy conservation, and fall prevention strategies during tasks listed above, and the effects of prolonged immobility and the importance of performing EOB/OOB activity and exercises to promote healing and reduce recovery time.       Patient left up in chair with all lines intact, call button in reach, and Rn notified..    GOALS:   Multidisciplinary Problems       Physical Therapy Goals          Problem: Physical Therapy    Goal  Priority Disciplines Outcome Goal Variances Interventions   Physical Therapy Goal     PT, PT/OT Progressing     Description: Goals to be met by: 2024     Patient will increase functional independence with mobility by performin. Supine to sit with Stand-by Assistance  2. Sit to supine with Stand-by Assistance  3. Sit to stand transfer with Supervision  4. Bed to chair transfer with Supervision using LRAD  5. Gait  x 100 feet with Contact Guard Assistance using LRAD.   6. Ascend/descend 1 stair with no Handrails Contact Guard Assistance using LRAD.     The mobility limitation cannot be sufficiently resolved by the use of a cane.   Patient's functional mobility deficit can be sufficiently resolved with the use of a (bariatric Rolling Walker or Walker).  Patient's mobility limitation significantly impairs their ability to participate in one of more activities of daily living.  The use of a (bariatric RW or Walker) will significantly improve the patient's ability to participate in MRADLS and the patient will use it on regular basis in the home.                           Time Tracking:     PT Received On: 24  PT Start Time: 1141     PT Stop Time: 1208  PT Total Time (min): 27 min     Billable Minutes: Gait Training 10 and Therapeutic Exercise 17    Treatment Type: Treatment  PT/PTA: PTA     Number of PTA visits since last PT visit: 3     2024

## 2024-08-12 NOTE — CARE UPDATE
I have reviewed the chart of Vicky Alvarado who is hospitalized for the following:    Active Hospital Problems    Diagnosis    *Anasarca    Hyponatremia    Irritant contact dermatitis due to fecal, urinary or dual incontinence    Stage 3b chronic kidney disease    Volume overload    Type 2 diabetes mellitus with diabetic polyneuropathy, without long-term current use of insulin    Atrial Fibrillation (paroxysmal)    Essential hypertension        GHAZAL MYERS, FNP  [facility]

## 2024-08-12 NOTE — PLAN OF CARE
No acute changes. Ascites persists. CPAP @ .  working on placement facility.      Problem: Adult Inpatient Plan of Care  Goal: Plan of Care Review  Outcome: Progressing  Goal: Patient-Specific Goal (Individualized)  Outcome: Progressing  Goal: Absence of Hospital-Acquired Illness or Injury  Outcome: Progressing  Goal: Optimal Comfort and Wellbeing  Outcome: Progressing  Goal: Readiness for Transition of Care  Outcome: Progressing     Problem: Bariatric Environmental Safety  Goal: Safety Maintained with Care  Outcome: Progressing     Problem: Diabetes Comorbidity  Goal: Blood Glucose Level Within Targeted Range  Outcome: Progressing     Problem: Skin Injury Risk Increased  Goal: Skin Health and Integrity  Outcome: Progressing     Problem: Wound  Goal: Optimal Coping  Outcome: Progressing  Goal: Optimal Functional Ability  Outcome: Progressing  Goal: Absence of Infection Signs and Symptoms  Outcome: Progressing  Goal: Improved Oral Intake  Outcome: Progressing  Goal: Optimal Pain Control and Function  Outcome: Progressing  Goal: Skin Health and Integrity  Outcome: Progressing  Goal: Optimal Wound Healing  Outcome: Progressing     Problem: Pain Acute  Goal: Optimal Pain Control and Function  Outcome: Progressing     Problem: Liver Failure  Goal: Optimal Coping with Liver Failure  Outcome: Progressing  Goal: Fluid and Electrolyte Balance  Outcome: Progressing  Goal: Optimal Gastrointestinal Function  Outcome: Progressing  Goal: Blood Glucose Level Within Target Range  Outcome: Progressing  Goal: Optimal Coagulation Function  Outcome: Progressing  Goal: Absence of Infection Signs and Symptoms  Outcome: Progressing  Goal: Optimal Neurologic Function  Outcome: Progressing  Goal: Improved Oral Intake  Outcome: Progressing  Goal: Optimal Pain Control, Comfort and Function  Outcome: Progressing  Goal: Optimize Renal Function  Outcome: Progressing  Goal: Effective Oxygenation and Ventilation  Outcome: Progressing      Problem: Diarrhea  Goal: Effective Diarrhea Management  Outcome: Progressing

## 2024-08-12 NOTE — PLAN OF CARE
NURSING HOME ORDERS    08/12/2024  [facility]  [facility] - STEPDOWN FLEX ([place] [place])  [address]  [facility] [address]  Dept: [phone]  Loc: [phone]     Admit to Nursing Home:      Diagnoses:  Active Hospital Problems    Diagnosis  POA    *Anasarca [R60.1]  Yes    Hyponatremia [E87.1]  Yes    Irritant contact dermatitis due to fecal, urinary or dual incontinence [L24.A2]  No    Stage 3b chronic kidney disease [N18.32]  Yes    Volume overload [E87.70]  Yes    Type 2 diabetes mellitus with diabetic polyneuropathy, without long-term current use of insulin [E11.42]  Yes     Chronic    Atrial Fibrillation (paroxysmal) [I48.0]  Yes    Essential hypertension [I10]  Yes      Resolved Hospital Problems   No resolved problems to display.       Patient is homebound due to:  Anasarca    Allergies:  Review of patient's allergies indicates:   Allergen Reactions    Flecainide Shortness Of Breath and Swelling    Shellfish containing products Other (See Comments)     Makes gout terrible    Vancomycin analogues Hives, Itching and Rash       Vitals:  Every shift    Diet: 2 gram sodium diet    Activities:   Activity as tolerated    Goals of Care Treatment Preferences:  Code Status: Full Code      Labs:  CMP on 8/16    Nursing Precautions:  Fall    Consults:   PT to evaluate and treat- 3 times a week and OT to evaluate and treat- 3 times a week     Miscellaneous Care: CPAP settings APAP 6-14 cm H2O                   Diabetes Care:  None      Medications: Discontinue all previous medication orders, if any. See new list below.     Medication List        START taking these medications      lactulose 10 gram/15 mL solution  Commonly known as: CHRONULAC  Take 15 mLs (10 g total) by mouth 2 (two) times daily. Please ensure you are having at least 2-3 bowel movements every day.            CHANGE how you take these medications      acetaminophen 500 MG tablet  Commonly known as: TYLENOL  Take 2  tablets (1,000 mg total) by mouth every evening.  What changed:   when to take this  reasons to take this  additional instructions     albuterol 90 mcg/actuation inhaler  Commonly known as: VENTOLIN HFA  Inhale 2 puffs into the lungs every 6 (six) hours as needed for Wheezing. Rescue  What changed:   when to take this  reasons to take this     DULoxetine 60 MG capsule  Commonly known as: CYMBALTA  Take 1 capsule (60 mg total) by mouth once daily.  What changed: how much to take     lisinopriL 40 MG tablet  Commonly known as: PRINIVIL,ZESTRIL  Take 1 tablet (40 mg total) by mouth once daily.  Notes to patient: HOLD until follow-up with PCP      NIFEdipine 60 MG (OSM) 24 hr tablet  Commonly known as: PROCARDIA-XL  Take 1 tablet (60 mg total) by mouth once daily.  Notes to patient: HOLD until follow-up with PCP      omeprazole 20 MG capsule  Commonly known as: PRILOSEC  TAKE 1 CAPSULE BY MOUTH ONCE DAILY  What changed:   how much to take  when to take this  reasons to take this            CONTINUE taking these medications      ALPRAZolam 0.5 MG tablet  Commonly known as: XANAX  Take 1 tablet (0.5 mg total) by mouth daily as needed for Anxiety.     b complex vitamins capsule  Take 1 capsule by mouth every other day.     metoprolol succinate 100 MG 24 hr tablet  Commonly known as: TOPROL-XL  Take 1 tablet (100 mg total) by mouth 2 (two) times daily.     multivitamin per tablet  Commonly known as: THERAGRAN  Take 1 tablet by mouth once daily.            STOP taking these medications      aspirin 81 MG EC tablet  Commonly known as: ECOTRIN     atorvastatin 80 MG tablet  Commonly known as: LIPITOR     colchicine 0.6 mg tablet  Commonly known as: COLCRYS                Immunizations Administered as of 8/12/2024       Name Date Dose VIS Date Route Exp Date    COVID-19, MRNA, LN-S, PF (Pfizer) (Purple Cap) 9/11/2021 11:11 AM 0.3 mL 12/12/2020 Intramuscular 10/31/2021    Site: Left deltoid     Given By: Hanna Lau MA      : [employer]     Lot: [id number]     COVID-19, MRNA, LN-S, PF (Pfizer) (Purple Cap) 1/11/2021  5:46 AM 0.3 mL 12/12/2020 Intramuscular 4/30/2021    Site: Left arm     Given By: Ambar De La Cruz RN     : [employer]     Lot: [id number]     COVID-19, MRNA, LN-S, PF (Pfizer) (Purple Cap) 12/21/2020  5:42 AM 0.3 mL 12/12/2020 Intramuscular 12/21/2020    Site: Left deltoid     Given By: Amelia Cartagena LPN     : [employer]     Lot: [id number]                 Some patients may experience side effects after vaccination.  These may include fever, headache, muscle or joint aches.  Most symptoms resolve with 24-48 hours and do not require urgent medical evaluation unless they persist for more than 72 hours or symptoms are concerning for an unrelated medical condition.          _________________________________  Monster Cruz MD  08/12/2024

## 2024-08-13 VITALS
OXYGEN SATURATION: 100 % | SYSTOLIC BLOOD PRESSURE: 99 MMHG | RESPIRATION RATE: 17 BRPM | HEART RATE: 66 BPM | DIASTOLIC BLOOD PRESSURE: 54 MMHG | TEMPERATURE: 98 F | BODY MASS INDEX: 36.44 KG/M2 | HEIGHT: 65 IN | WEIGHT: 218.69 LBS

## 2024-08-13 LAB
ALBUMIN SERPL BCP-MCNC: 2.3 G/DL (ref 3.5–5.2)
ALP SERPL-CCNC: 194 U/L (ref 55–135)
ALT SERPL W/O P-5'-P-CCNC: 5 U/L (ref 10–44)
ANION GAP SERPL CALC-SCNC: 12 MMOL/L (ref 8–16)
AST SERPL-CCNC: 27 U/L (ref 10–40)
BASOPHILS # BLD AUTO: 0.08 K/UL (ref 0–0.2)
BASOPHILS NFR BLD: 0.9 % (ref 0–1.9)
BILIRUB SERPL-MCNC: 0.8 MG/DL (ref 0.1–1)
BUN SERPL-MCNC: 31 MG/DL (ref 6–20)
CALCIUM SERPL-MCNC: 9.5 MG/DL (ref 8.7–10.5)
CHLORIDE SERPL-SCNC: 92 MMOL/L (ref 95–110)
CO2 SERPL-SCNC: 27 MMOL/L (ref 23–29)
CREAT SERPL-MCNC: 1.5 MG/DL (ref 0.5–1.4)
DIFFERENTIAL METHOD BLD: ABNORMAL
EOSINOPHIL # BLD AUTO: 0.4 K/UL (ref 0–0.5)
EOSINOPHIL NFR BLD: 3.9 % (ref 0–8)
ERYTHROCYTE [DISTWIDTH] IN BLOOD BY AUTOMATED COUNT: 14.8 % (ref 11.5–14.5)
EST. GFR  (NO RACE VARIABLE): 39.6 ML/MIN/1.73 M^2
GLUCOSE SERPL-MCNC: 94 MG/DL (ref 70–110)
HCT VFR BLD AUTO: 36 % (ref 37–48.5)
HGB BLD-MCNC: 10.8 G/DL (ref 12–16)
IMM GRANULOCYTES # BLD AUTO: 0.08 K/UL (ref 0–0.04)
IMM GRANULOCYTES NFR BLD AUTO: 0.9 % (ref 0–0.5)
LYMPHOCYTES # BLD AUTO: 1 K/UL (ref 1–4.8)
LYMPHOCYTES NFR BLD: 10.6 % (ref 18–48)
MAGNESIUM SERPL-MCNC: 2.1 MG/DL (ref 1.6–2.6)
MCH RBC QN AUTO: 27.4 PG (ref 27–31)
MCHC RBC AUTO-ENTMCNC: 30 G/DL (ref 32–36)
MCV RBC AUTO: 91 FL (ref 82–98)
MONOCYTES # BLD AUTO: 1.1 K/UL (ref 0.3–1)
MONOCYTES NFR BLD: 12.3 % (ref 4–15)
NEUTROPHILS # BLD AUTO: 6.5 K/UL (ref 1.8–7.7)
NEUTROPHILS NFR BLD: 71.4 % (ref 38–73)
NRBC BLD-RTO: 0 /100 WBC
PHOSPHATE SERPL-MCNC: 3.5 MG/DL (ref 2.7–4.5)
PLATELET # BLD AUTO: 330 K/UL (ref 150–450)
PMV BLD AUTO: 10.3 FL (ref 9.2–12.9)
POTASSIUM SERPL-SCNC: 4.6 MMOL/L (ref 3.5–5.1)
PROT SERPL-MCNC: 6.4 G/DL (ref 6–8.4)
RBC # BLD AUTO: 3.94 M/UL (ref 4–5.4)
SODIUM SERPL-SCNC: 131 MMOL/L (ref 136–145)
WBC # BLD AUTO: 9.14 K/UL (ref 3.9–12.7)

## 2024-08-13 PROCEDURE — 80053 COMPREHEN METABOLIC PANEL: CPT | Performed by: STUDENT IN AN ORGANIZED HEALTH CARE EDUCATION/TRAINING PROGRAM

## 2024-08-13 PROCEDURE — 0W9G3ZZ DRAINAGE OF PERITONEAL CAVITY, PERCUTANEOUS APPROACH: ICD-10-PCS | Performed by: INTERNAL MEDICINE

## 2024-08-13 PROCEDURE — 85025 COMPLETE CBC W/AUTO DIFF WBC: CPT

## 2024-08-13 PROCEDURE — 84100 ASSAY OF PHOSPHORUS: CPT

## 2024-08-13 PROCEDURE — 25000003 PHARM REV CODE 250

## 2024-08-13 PROCEDURE — 63600175 PHARM REV CODE 636 W HCPCS: Mod: JG

## 2024-08-13 PROCEDURE — 83735 ASSAY OF MAGNESIUM: CPT

## 2024-08-13 PROCEDURE — P9047 ALBUMIN (HUMAN), 25%, 50ML: HCPCS | Mod: JG

## 2024-08-13 PROCEDURE — 36415 COLL VENOUS BLD VENIPUNCTURE: CPT | Performed by: STUDENT IN AN ORGANIZED HEALTH CARE EDUCATION/TRAINING PROGRAM

## 2024-08-13 RX ORDER — ALBUMIN HUMAN 250 G/1000ML
SOLUTION INTRAVENOUS
Status: DISCONTINUED
Start: 2024-08-13 | End: 2024-08-13 | Stop reason: HOSPADM

## 2024-08-13 RX ORDER — ALBUMIN HUMAN 250 G/1000ML
SOLUTION INTRAVENOUS
Status: COMPLETED | OUTPATIENT
Start: 2024-08-13 | End: 2024-08-13

## 2024-08-13 RX ADMIN — SIMETHICONE 80 MG: 80 TABLET, CHEWABLE ORAL at 08:08

## 2024-08-13 RX ADMIN — LACTULOSE 10 G: 20 SOLUTION ORAL at 08:08

## 2024-08-13 RX ADMIN — ALPRAZOLAM 0.25 MG: 0.25 TABLET ORAL at 01:08

## 2024-08-13 RX ADMIN — METOPROLOL SUCCINATE 100 MG: 100 TABLET, EXTENDED RELEASE ORAL at 08:08

## 2024-08-13 RX ADMIN — ACETAMINOPHEN 650 MG: 325 TABLET ORAL at 01:08

## 2024-08-13 RX ADMIN — PANTOPRAZOLE SODIUM 40 MG: 40 TABLET, DELAYED RELEASE ORAL at 08:08

## 2024-08-13 RX ADMIN — ACETAMINOPHEN 650 MG: 325 TABLET ORAL at 02:08

## 2024-08-13 RX ADMIN — ALPRAZOLAM 0.25 MG: 0.25 TABLET ORAL at 02:08

## 2024-08-13 RX ADMIN — DULOXETINE HYDROCHLORIDE 60 MG: 30 CAPSULE, DELAYED RELEASE ORAL at 08:08

## 2024-08-13 RX ADMIN — ALBUMIN (HUMAN) 62.5 G: 12.5 SOLUTION INTRAVENOUS at 09:08

## 2024-08-13 NOTE — H&P
Inpatient Radiology Pre-procedure Note    History of Present Illness:  Vicky Alvarado is a 59 y.o. female who presents for US guided paracentesis.    Admission H&P reviewed.  Past Medical History:   Diagnosis Date    Anticoagulant long-term use     Arthritis     Atrial fibrillation     cardioversion    Atrial fibrillation with RVR     HAS WATCHMAN IN PLACE NOW    Avascular necrosis     L hand    CHF (congestive heart failure)     Chronic fatigue     Depression     Diabetes mellitus     Encounter for blood transfusion 07/22/2020    Encounter for blood transfusion 03/2022    Fatty liver     GERD (gastroesophageal reflux disease)     Hx of psychiatric care     Hypertension     Iron deficiency anemia 05/07/2022    TIBC 444 with saturated iron 8    Liver disease     Obese     Pre-diabetes 05/07/2022    Psychiatric problem     Sleep apnea     wears cpap    SOB (shortness of breath) on exertion     Weight loss     75lb intentional weight loss     Past Surgical History:   Procedure Laterality Date    ABLATION OF ARRHYTHMOGENIC FOCUS FOR ATRIAL FIBRILLATION N/A 07/20/2020    Procedure: Ablation atrial fibrillation;  Surgeon: Gabriel Hawley MD;  Location: [facility] EP LAB;  Service: Cardiology;  Laterality: N/A;  afib, PVI, RFA, REENA, SHADY, anes, MB, 3 Prep    ABLATION OF ARRHYTHMOGENIC FOCUS FOR ATRIAL FIBRILLATION N/A 01/24/2022    Procedure: Ablation atrial fibrillation;  Surgeon: Gabriel Hawley MD;  Location: [facility] EP LAB;  Service: Cardiology;  Laterality: N/A;  afib/afl, PVI (re-do)/CTI, RFA, REENA (cx if SR), SHADY, anes, MB, 3 Prep    APPLICATION OF WOUND VACUUM-ASSISTED CLOSURE DEVICE Left 08/03/2020    Procedure: APPLICATION, WOUND VAC;  Surgeon: SEMAJ Márquez III, MD;  Location: [facility] OR [address] [place] [address];  Service: Peripheral Vascular;  Laterality: Left;    APPLICATION OF WOUND VACUUM-ASSISTED CLOSURE DEVICE Left 08/06/2020    Procedure: APPLICATION, WOUND VAC;  Surgeon: SEMAJ Márquez III, MD;  Location: [facility] OR  2ND FLR;  Service: Peripheral Vascular;  Laterality: Left;    CARPAL TUNNEL RELEASE Right 06/10/2020    Procedure: RELEASE, CARPAL TUNNEL - RIGHT;  Surgeon: Adelaida Hall MD;  Location: [facility] OR;  Service: Orthopedics;  Laterality: Right;  GENERAL AND REGIONAL    CARPAL TUNNEL RELEASE Left 05/05/2021    Procedure: RELEASE, CARPAL TUNNEL, LEFT;  Surgeon: Adelaida Hall MD;  Location: [facility] OR;  Service: Orthopedics;  Laterality: Left;  GENERAL & REGIONAL IN PACU    CARPECTOMY Left 9/6/2023    Procedure: CARPECTOMY;  Surgeon: Adelaida Hall MD;  Location: [facility] OR;  Service: Orthopedics;  Laterality: Left;  MAC/REGIONAL  LEFT PROXIMAL ROW    CLOSURE OF WOUND Left 08/06/2020    Procedure: CLOSURE, WOUND;  Surgeon: SEMAJ Márquez III, MD;  [address];  Service: Peripheral Vascular;  Laterality: Left;  Complex    COLONOSCOPY N/A 08/17/2021    Procedure: COLONOSCOPY Suprep;  Surgeon: Loco Deluca MD;  Location: [facility];  Service: Endoscopy;  Laterality: N/A;    ECHOCARDIOGRAM,TRANSESOPHAGEAL N/A 07/24/2023    Procedure: Transesophageal echo (REENA) intra-procedure log documentation;  [facility];  Location: [facility] EP LAB;  Service: Cardiology;  Laterality: N/A;  s/p WM, REENA, anes, 3 Prep    ESOPHAGOGASTRODUODENOSCOPY N/A 08/17/2021    Procedure: EGD (ESOPHAGOGASTRODUODENOSCOPY);  Surgeon: Loco Deluca MD;  Location: [facility];  Service: Endoscopy;  Laterality: N/A;  Patient is schedule to have her Covid test on 08/14/2021 at the [facility] @ 9:30am. [place].    EXPLORATION OF FEMORAL ARTERY Left 07/21/2020    Procedure: EXPLORATION, ARTERY, FEMORAL;  Surgeon: SEMAJ Márquez III, MD;  Location: [facility] 2ND FLR;  Service: Peripheral Vascular;  Laterality: Left;  1. Urgent Direct repair, L SFA branch laceration    FOOT SURGERY      HYSTERECTOMY      HYSTEROSCOPY WITH DILATION AND CURETTAGE OF UTERUS N/A 02/19/2022    Procedure: HYSTEROSCOPY,  WITH DILATION AND CURETTAGE OF UTERUS;  Surgeon: Shane Palomo MD;  Location: [address] FLR;  Service: OB/GYN;  Laterality: N/A;    INCISION AND DRAINAGE OF KNEE Left 05/12/2022    Procedure: INCISION AND DRAINAGE, Prepatellar bursectomy.;  Surgeon: Dre Guzmán MD;  Location: [address] FLR;  Service: Orthopedics;  Laterality: Left;    LEFT HEART CATHETERIZATION Left 02/07/2023    Procedure: Left heart cath;  Surgeon: Josiah Terry MD;  Location: [facility] CATH LAB;  Service: Cardiology;  Laterality: Left;  borderline moderate bleeding risk 6.3%    OCCLUSION OF LEFT ATRIAL APPENDAGE N/A 06/12/2023    Procedure: Left atrial appendage occlusion;  Surgeon: Gabriel Hawley MD;  Location: [facility] EP LAB;  Service: Cardiology;  Laterality: N/A;  afib, watchman, BSCI, demi, anes, MB, 3prep    RELEASE OF ULNAR NERVE AT CUBITAL TUNNEL Bilateral     RIGHT HEART CATHETERIZATION Right 8/5/2024    Procedure: INSERTION, CATHETER, RIGHT HEART;  Surgeon: Zane Liu MD;  Location: [facility] CATH LAB;  Service: Cardiology;  Laterality: Right;    ROBOT-ASSISTED LAPAROSCOPIC ABDOMINAL HYSTERECTOMY USING DA KEIRY XI N/A 08/09/2022    Procedure: XI ROBOTIC HYSTERECTOMY;  Surgeon: Yolanda Barriga MD;  Location: [facility];  Service: OB/GYN;  Laterality: N/A;  dual console requested    ROBOT-ASSISTED LAPAROSCOPIC SALPINGO-OOPHORECTOMY Bilateral 08/09/2022    Procedure: ROBOTIC SALPINGO-OOPHORECTOMY;  Surgeon: Yolanda Barriga MD;  Location: [facility];  Service: OB/GYN;  Laterality: Bilateral;    TRANSESOPHAGEAL ECHOCARDIOGRAPHY N/A 06/12/2023    Procedure: ECHOCARDIOGRAM, TRANSESOPHAGEAL;  Surgeon: Cyril Mast MD;  Location: [facility] EP LAB;  Service: Cardiology;  Laterality: N/A;    TREATMENT OF CARDIAC ARRHYTHMIA N/A 01/29/2020    Procedure: CARDIOVERSION;  Surgeon: Gabriel Hawley MD;  Location: [facility] EP LAB;  Service: Cardiology;  Laterality: N/A;  af, demi, dccv, anes, mb, 345    TREATMENT OF CARDIAC ARRHYTHMIA  01/24/2022     Procedure: Cardioversion or Defibrillation;  Surgeon: Gabriel Hawley MD;  Location: [facility] EP LAB;  Service: Cardiology;;    WOUND DEBRIDEMENT Left 08/06/2020    Procedure: DEBRIDEMENT, WOUND;  Surgeon: SEMAJ Márquez III, MD;  Location: [facility] OR [address] [place] [address];  Service: Peripheral Vascular;  Laterality: Left;       Review of Systems:   As documented in primary team H&P    Home Meds:   Prior to Admission medications    Medication Sig Start Date End Date Taking? Authorizing Provider   albuterol (VENTOLIN HFA) 90 mcg/actuation inhaler Inhale 2 puffs into the lungs every 6 (six) hours as needed for Wheezing. Rescue 12/14/23 12/13/24 Yes Gordy Cordero MD   b complex vitamins capsule Take 1 capsule by mouth every other day.   Yes Provider, Historical   lisinopriL (PRINIVIL,ZESTRIL) 40 MG tablet Take 1 tablet (40 mg total) by mouth once daily. 6/27/24 6/27/25 Yes Gordy Cordero MD   metoprolol succinate (TOPROL-XL) 100 MG 24 hr tablet Take 1 tablet (100 mg total) by mouth 2 (two) times daily. 1/17/24  Yes Marah Hunter NP   multivitamin (THERAGRAN) per tablet Take 1 tablet by mouth once daily.   Yes Provider, Historical   NIFEdipine (PROCARDIA-XL) 60 MG (OSM) 24 hr tablet Take 1 tablet (60 mg total) by mouth once daily. 7/25/24 2/20/25 Yes Gordy Cordero MD   omeprazole (PRILOSEC) 20 MG capsule TAKE 1 CAPSULE BY MOUTH ONCE DAILY 7/25/24  Yes Gordy Cordero MD   acetaminophen (TYLENOL) 500 MG tablet Take 2 tablets (1,000 mg total) by mouth every evening. 8/12/24   Brennon Nunez MD   ALPRAZolam (XANAX) 0.5 MG tablet Take 1 tablet (0.5 mg total) by mouth daily as needed for Anxiety. 8/12/24   Brennon Nunez MD   DULoxetine (CYMBALTA) 60 MG capsule Take 1 capsule (60 mg total) by mouth once daily. 8/12/24   Brennon Nunez MD   lactulose (CHRONULAC) 10 gram/15 mL solution Take 15 mLs (10 g total) by mouth 2 (two) times daily. Please ensure you are having at least 2-3 bowel movements every day. 8/12/24    Brennon Nunez MD   medroxyPROGESTERone (PROVERA) 10 MG tablet Take 2 tablets (20 mg total) by mouth 3 (three) times daily. 7/13/22 8/9/22  Yolanda Barriga MD   pantoprazole (PROTONIX) 40 MG tablet Take 1 tablet (40 mg total) by mouth once daily.  Patient not taking: Reported on 2/18/2022 1/25/22 2/27/22  Geovanna Garcia MD     Scheduled Meds:    DULoxetine  60 mg Oral Daily    lactulose  10 g Oral BID    melatonin  3 mg Oral QHS    metoprolol succinate  100 mg Oral BID    pantoprazole  40 mg Oral Daily    simethicone  1 tablet Oral BID     Continuous Infusions:   PRN Meds:  Current Facility-Administered Medications:     acetaminophen, 650 mg, Oral, Q4H PRN    albuterol-ipratropium, 3 mL, Nebulization, Q4H PRN    ALPRAZolam, 0.25 mg, Oral, BID PRN    aluminum-magnesium hydroxide-simethicone, 30 mL, Oral, QID PRN    dextrose 10%, 12.5 g, Intravenous, PRN    dextrose 10%, 25 g, Intravenous, PRN    diclofenac sodium, 2 g, Topical (Top), TID PRN    glucagon (human recombinant), 1 mg, Intramuscular, PRN    glucose, 16 g, Oral, PRN    glucose, 24 g, Oral, PRN    naloxone, 0.02 mg, Intravenous, PRN    ondansetron, 8 mg, Oral, Q8H PRN    prochlorperazine, 5 mg, Intravenous, Q6H PRN    sodium chloride 0.9%, 10 mL, Intravenous, Q12H PRN    sodium chloride 0.9%, 10 mL, Intravenous, PRN  Anticoagulants/Antiplatelets: no anticoagulation    Allergies:   Review of patient's allergies indicates:   Allergen Reactions    Flecainide Shortness Of Breath and Swelling    Shellfish containing products Other (See Comments)     Makes gout terrible    Vancomycin analogues Hives, Itching and Rash     Sedation Hx: have not been any systemic reactions    Vitals:  Temp: 97.9 °F (36.6 °C) (08/13/24 0747)  Pulse: 64 (08/13/24 0747)  Resp: 20 (08/13/24 0747)  BP: 119/66 (08/13/24 0830)  SpO2: 95 % (08/13/24 7785)     Physical Exam:  ASA: 3  Mallampati: n/a    General: no acute distress  Mental Status: alert and oriented to person, place  and time  HEENT: normocephalic, atraumatic  Chest: unlabored breathing  Heart: regular heart rate  Abdomen: distended  Extremity: moves all extremities    Plan: US guided paracentesis  Sedation Plan: Local    Shannen Callahan PA-C  Interventional Radiology  [phone]

## 2024-08-13 NOTE — PLAN OF CARE
Procedure completed. Patient tolerated well; VSS. Site CDI. Patient drained 10L and received 62.5g of Albumin per protocol.  Report called to WILBERT Richards.  Transport requested.

## 2024-08-13 NOTE — PLAN OF CARE
NURSING HOME ORDERS    08/13/2024  [facility]  [facility] - STEPDOWN FLEX ([place])  [address]  [facility] [address]  Dept: [phone]  Loc: [phone]     Admit to Nursing Home:  Skilled Nursing Facility     Diagnoses:  Active Hospital Problems    Diagnosis  POA    *Anasarca [R60.1]  Yes    Hyponatremia [E87.1]  Yes    Irritant contact dermatitis due to fecal, urinary or dual incontinence [L24.A2]  No    Stage 3b chronic kidney disease [N18.32]  Yes    Volume overload [E87.70]  Yes    Type 2 diabetes mellitus with diabetic polyneuropathy, without long-term current use of insulin [E11.42]  Yes     Chronic    Atrial Fibrillation (paroxysmal) [I48.0]  Yes    Essential hypertension [I10]  Yes      Resolved Hospital Problems   No resolved problems to display.       Patient is homebound due to:  Anasarca    Allergies:  Review of patient's allergies indicates:   Allergen Reactions    Flecainide Shortness Of Breath and Swelling    Shellfish containing products Other (See Comments)     Makes gout terrible    Vancomycin analogues Hives, Itching and Rash       Vitals:  Every shift    Diet: 2 gram sodium diet    Activities:   Activity as tolerated    Goals of Care Treatment Preferences:  Code Status: Full Code      Labs:  CMP on 8/16    Nursing Precautions:  Fall    Consults:   PT to evaluate and treat- 3 times a week and OT to evaluate and treat- 3 times a week     Miscellaneous Care: CPAP settings APAP 6-14 cm H2O                   Diabetes Care:  None      Medications: Discontinue all previous medication orders, if any. See new list below.     Medication List        START taking these medications      lactulose 10 gram/15 mL solution  Commonly known as: CHRONULAC  Take 15 mLs (10 g total) by mouth 2 (two) times daily. Please ensure you are having at least 2-3 bowel movements every day.            CHANGE how you take these medications      acetaminophen 500 MG tablet  Commonly known  as: TYLENOL  Take 2 tablets (1,000 mg total) by mouth every evening.  What changed:   when to take this  reasons to take this  additional instructions     albuterol 90 mcg/actuation inhaler  Commonly known as: VENTOLIN HFA  Inhale 2 puffs into the lungs every 6 (six) hours as needed for Wheezing. Rescue  What changed:   when to take this  reasons to take this     DULoxetine 60 MG capsule  Commonly known as: CYMBALTA  Take 1 capsule (60 mg total) by mouth once daily.  What changed: how much to take     lisinopriL 40 MG tablet  Commonly known as: PRINIVIL,ZESTRIL  Take 1 tablet (40 mg total) by mouth once daily.  Notes to patient: HOLD until follow-up with PCP      NIFEdipine 60 MG (OSM) 24 hr tablet  Commonly known as: PROCARDIA-XL  Take 1 tablet (60 mg total) by mouth once daily.  Notes to patient: HOLD until follow-up with PCP      omeprazole 20 MG capsule  Commonly known as: PRILOSEC  TAKE 1 CAPSULE BY MOUTH ONCE DAILY  What changed:   how much to take  when to take this  reasons to take this            CONTINUE taking these medications      ALPRAZolam 0.5 MG tablet  Commonly known as: XANAX  Take 1 tablet (0.5 mg total) by mouth daily as needed for Anxiety.     b complex vitamins capsule  Take 1 capsule by mouth every other day.     metoprolol succinate 100 MG 24 hr tablet  Commonly known as: TOPROL-XL  Take 1 tablet (100 mg total) by mouth 2 (two) times daily.     multivitamin per tablet  Commonly known as: THERAGRAN  Take 1 tablet by mouth once daily.            STOP taking these medications      aspirin 81 MG EC tablet  Commonly known as: ECOTRIN     atorvastatin 80 MG tablet  Commonly known as: LIPITOR     colchicine 0.6 mg tablet  Commonly known as: COLCRYS                Immunizations Administered as of 8/13/2024       Name Date Dose VIS Date Route Exp Date    COVID-19, MRNA, LN-S, PF (Pfizer) (Purple Cap) 9/11/2021 11:11 AM 0.3 mL 12/12/2020 Intramuscular 10/31/2021    Site: Left deltoid     Given By:  Hanna Lau MA     : [employer]     Lot: [id number]     COVID-19, MRNA, LN-S, PF (Pfizer) (Purple Cap) 1/11/2021  5:46 AM 0.3 mL 12/12/2020 Intramuscular 4/30/2021    Site: Left arm     Given By: Ambar De La Cruz RN     : [employer]     Lot: [id number]     COVID-19, MRNA, LN-S, PF (Pfizer) (Purple Cap) 12/21/2020  5:42 AM 0.3 mL 12/12/2020 Intramuscular 12/21/2020    Site: Left deltoid     Given By: Amelia Cartagena LPN     : [employer]     Lot: [id number]                 Some patients may experience side effects after vaccination.  These may include fever, headache, muscle or joint aches.  Most symptoms resolve with 24-48 hours and do not require urgent medical evaluation unless they persist for more than 72 hours or symptoms are concerning for an unrelated medical condition.          _________________________________  Brennon Nunez MD  08/13/2024

## 2024-08-13 NOTE — PLAN OF CARE
[facility] - Stepdown Flex ([place])  Discharge Final Note    Primary Care Provider: Gordy Cordero MD    Expected Discharge Date: 8/13/2024    Final Discharge Note (most recent)       Final Note - 08/13/24 1435          Final Note    Assessment Type Final Discharge Note     Anticipated Discharge Disposition Skilled Nursing Facility     What phone number can be called within the next 1-3 days to see how you are doing after discharge? [phone]     Hospital Resources/Appts/Education Provided Provided patient/caregiver with written discharge plan information;Appointments scheduled and added to AVS        Post-Acute Status    Post-Acute Authorization Placement     Post-Acute Placement Status Set-up Complete/Auth obtained     Patient choice form signed by patient/caregiver List with quality metrics by geographic area provided     Discharge Delays None known at this time                     Important Message from Medicare             Contact Info       [facility]    [address] [place] [address]  [phone]       Next Steps: Follow up    Hepatology    Hepatology  [phone]       Next Steps: Follow up    Instructions: A message was sent to the Hepatology clinic, the clinic nurse will contact you to schedule a hospital follow up visit. However, if you do not hear from them within 24 to 48 hours of discharge, please call to schedule the appointment.          Patient to discharge to [facility]SNF to room 227A via [facility].     KLAUS Amaya  [facility]  [phone]

## 2024-08-13 NOTE — PROCEDURES
Radiology Post-Procedure Note    Pre Op Diagnosis: Ascites  Post Op Diagnosis: Same    Procedure: Ultrasound Guided Paracentesis    Procedure performed by: Shannen Callahan PA-C    Written Informed Consent Obtained: Yes  Specimen Removed: YES (yellow, clear)  Estimated Blood Loss: Minimal    Findings:   Successful paracentesis from LLQ.  Albumin administered PRN per protocol.    Patient tolerated procedure well.    Shannen Callahan PA-C  Interventional Radiology  [phone]

## 2024-08-13 NOTE — PLAN OF CARE
NURSING HOME ORDERS    08/13/2024  [facility]  [facility] ([place])  [address]  [place] [address]  Dept: [phone]  Loc: [phone]     Admit to Nursing Home:      Diagnoses:  Active Hospital Problems    Diagnosis  POA    *Anasarca [R60.1]  Yes    Hyponatremia [E87.1]  Yes    Irritant contact dermatitis due to fecal, urinary or dual incontinence [L24.A2]  No    Stage 3b chronic kidney disease [N18.32]  Yes    Volume overload [E87.70]  Yes    Type 2 diabetes mellitus with diabetic polyneuropathy, without long-term current use of insulin [E11.42]  Yes     Chronic    Atrial Fibrillation (paroxysmal) [I48.0]  Yes    Essential hypertension [I10]  Yes      Resolved Hospital Problems   No resolved problems to display.       Patient is homebound due to:  Anasarca    Allergies:  Review of patient's allergies indicates:   Allergen Reactions    Flecainide Shortness Of Breath and Swelling    Shellfish containing products Other (See Comments)     Makes gout terrible    Vancomycin analogues Hives, Itching and Rash       Vitals:  Every shift    Diet: 2 gram sodium diet    Activities:   Activity as tolerated    Goals of Care Treatment Preferences:  Code Status: Full Code      Labs:  CMP on 8/16    Nursing Precautions:  Fall    Consults:   PT to evaluate and treat- 3 times a week and OT to evaluate and treat- 3 times a week     Miscellaneous Care: CPAP settings APAP 6-14 cm H2O                   Diabetes Care:  None      Medications: Discontinue all previous medication orders, if any. See new list below.     Medication List        START taking these medications      lactulose 10 gram/15 mL solution  Commonly known as: CHRONULAC  Take 15 mLs (10 g total) by mouth 2 (two) times daily. Please ensure you are having at least 2-3 bowel movements every day.            CHANGE how you take these medications      acetaminophen 500 MG tablet  Commonly known as: TYLENOL  Take 2  tablets (1,000 mg total) by mouth every evening.  What changed:   when to take this  reasons to take this  additional instructions     albuterol 90 mcg/actuation inhaler  Commonly known as: VENTOLIN HFA  Inhale 2 puffs into the lungs every 6 (six) hours as needed for Wheezing. Rescue  What changed:   when to take this  reasons to take this     DULoxetine 60 MG capsule  Commonly known as: CYMBALTA  Take 1 capsule (60 mg total) by mouth once daily.  What changed: how much to take     lisinopriL 40 MG tablet  Commonly known as: PRINIVIL,ZESTRIL  Take 1 tablet (40 mg total) by mouth once daily.  Notes to patient: HOLD until follow-up with PCP      NIFEdipine 60 MG (OSM) 24 hr tablet  Commonly known as: PROCARDIA-XL  Take 1 tablet (60 mg total) by mouth once daily.  Notes to patient: HOLD until follow-up with PCP      omeprazole 20 MG capsule  Commonly known as: PRILOSEC  TAKE 1 CAPSULE BY MOUTH ONCE DAILY  What changed:   how much to take  when to take this  reasons to take this            CONTINUE taking these medications      ALPRAZolam 0.5 MG tablet  Commonly known as: XANAX  Take 1 tablet (0.5 mg total) by mouth daily as needed for Anxiety.     b complex vitamins capsule  Take 1 capsule by mouth every other day.     metoprolol succinate 100 MG 24 hr tablet  Commonly known as: TOPROL-XL  Take 1 tablet (100 mg total) by mouth 2 (two) times daily.     multivitamin per tablet  Commonly known as: THERAGRAN  Take 1 tablet by mouth once daily.            STOP taking these medications      aspirin 81 MG EC tablet  Commonly known as: ECOTRIN     atorvastatin 80 MG tablet  Commonly known as: LIPITOR     colchicine 0.6 mg tablet  Commonly known as: COLCRYS                Immunizations Administered as of 8/13/2024       Name Date Dose VIS Date Route Exp Date    COVID-19, MRNA, LN-S, PF (Pfizer) (Purple Cap) 9/11/2021 11:11 AM 0.3 mL 12/12/2020 Intramuscular 10/31/2021    Site: Left deltoid     Given By: Hanna Lau MA      : [employer]     Lot: [id number]     COVID-19, MRNA, LN-S, PF (Pfizer) (Purple Cap) 1/11/2021  5:46 AM 0.3 mL 12/12/2020 Intramuscular 4/30/2021    Site: Left arm     Given By: Ambar De La Cruz RN     : [employer]     Lot: [id number]     COVID-19, MRNA, LN-S, PF (Pfizer) (Purple Cap) 12/21/2020  5:42 AM 0.3 mL 12/12/2020 Intramuscular 12/21/2020    Site: Left deltoid     Given By: Amelia Cartagena LPN     : [employer]     Lot: [id number]                 Some patients may experience side effects after vaccination.  These may include fever, headache, muscle or joint aches.  Most symptoms resolve with 24-48 hours and do not require urgent medical evaluation unless they persist for more than 72 hours or symptoms are concerning for an unrelated medical condition.          _________________________________  Brennon Nunez MD  08/13/2024

## 2024-08-13 NOTE — PT/OT/SLP PROGRESS
Occupational Therapy      Patient Name:  Vicky Alvarado   MRN:  [id number]    Patient not seen today secondary to declining stating she is leaving for SNF soon. Will follow-up.    8/13/2024

## 2024-08-13 NOTE — PLAN OF CARE
Problem: Adult Inpatient Plan of Care  Goal: Plan of Care Review  Outcome: Progressing  Goal: Absence of Hospital-Acquired Illness or Injury  Outcome: Progressing  Goal: Optimal Comfort and Wellbeing  Outcome: Progressing  Goal: Readiness for Transition of Care  Outcome: Progressing     Problem: Diabetes Comorbidity  Goal: Blood Glucose Level Within Targeted Range  Outcome: Progressing     Problem: Skin Injury Risk Increased  Goal: Skin Health and Integrity  Outcome: Progressing     Problem: Wound  Goal: Optimal Functional Ability  Outcome: Progressing  Goal: Absence of Infection Signs and Symptoms  Outcome: Progressing  Goal: Improved Oral Intake  Outcome: Progressing     Problem: Liver Failure  Goal: Optimal Coping with Liver Failure  Outcome: Progressing  Goal: Fluid and Electrolyte Balance  Outcome: Progressing  Goal: Optimal Neurologic Function  Outcome: Progressing

## 2024-08-13 NOTE — DISCHARGE SUMMARY
[facility] ([place] [address])  [facility] Medicine  Discharge Summary      Patient Name: Vicky Alvarado  MRN: [id number]  JESSICA: [id number]  Patient Class: IP- Inpatient  Admission Date: 7/26/2024  Hospital Length of Stay: 17 days  Discharge Date and Time:  08/13/2024 5:38 PM  Attending Physician: No att. providers found   Discharging Provider: Brennon Nunez MD  Primary Care Provider: Gordy Cordero MD  [facility] Medicine Team: [facility] HOSP MED 5 Brennon Nunez MD  Primary Care Team: [facility] HOSP MED 5    HPI:   Patient is a 59F with CHF (EF 60%, G2DD), HTN, Afib (s/p watchman procedure), T2DM (prev. on tirzepatide) presents to the ED w/ minor fall and progressively worsening SOB. Notes she couldn't get up after her fall, came in in a wheelchair. Notes she has had SOB for the past month with exertion, though now it is with rest as well. Reports orthopnea during this time frame. States she does not have any pain with breathing, but does additionally endorse occasional, intermittent chest pain. Denies abdominal pain, fever, or chills. States she has early satiety. No prior admission for HF exacerbation. Reports experiencing b/l lower extremity edema previously, but denies prior abdominal swelling. Swelling precluding normal ambulation, using walker (previously for balance) to assist d.t weakness. Has not missed doses of home lasix or antihypertensive medications. No reduced urine output at home.     In ED, normotensive, no tachycardia, some tachypnea (RR 22-28), initially on NRB -> HFNC 12L -> BIPAP -> 6L. No desaturations recorded during transitions in O2 therapy (BIPAP primarily placed for pulmonary edema seen on CXR). CBC with normocytic anemia (Hb 10.6), no leukocytosis. CMP with hypokalemia K 2.9, Cr 1.8/BUN 27 (baseline appears to be 1-1.4), Alb 2.7, no LFT elevation. Mg 1.5. , Troponin normal. VBG 7.46 / 42. CT AP with cirrhotic liver morphology, questionable GB sludge/stones, large volume  ascites. CXR with hypoventilatory changes, R>L perihilar infiltrates c/f CHF. EKG NSR.  Received K replacement, 100mg IV Lasix in ED.       Procedure(s) (LRB):  INSERTION, CATHETER, RIGHT HEART (Right)      Hospital Course:   [facility] volume overload and SOB. Patient has had CHF exacerbation in the past but also has evidence of a cirrhosis requiring a paracentesis in the ED in which they removed 7 L of fluid, without evidence of SBP.  Abdominal CT shows a cirrhotic morphology of the liver as well as US/doppler with the addition of splenomegaly and portal HTN.  Protein studies of the ascitic fluid show increased protein leaning more towards a cardiac etiology, however right heart cath performed 8/5/24 did not show clear evidence of a cardiac component driving her ascites. TTE on (7/29/24) showed intermediate diastolic dysfunction with EF 60-65%, minor aortic stenosis, and moderate pulmonary hypertension with estimated pulmonary artery systolic pressure is 54 mmHg.  Liver biopsy performed 8/5/24 showed cirhosis likley due to a congestive hepatopathy. Hepatology believes there may be another underlying cause behind the cirrhosis and  recommend further workup of other causes. Cytology fluid has been negative for malignant cells thus far. MRI of the abdomen and pelvis showed a 2.6 x 2.4 cm left adrenal nodule. PETH score was mildly positive; advised to no longer drink alcohol.     Patient medically stable for discharge, will require weekly paracentesis, CMP/CBC, and outpatient hepatology evaluation for transplant and TIPS consideration. Pending placement for SNF.     Patient will need to see their PCP and then connect with hepatology     Goals of Care Treatment Preferences:  Code Status: Full Code      SDOH Screening:  The patient was screened for utility difficulties, food insecurity, transport difficulties, housing insecurity, and interpersonal safety and there were no concerns identified this  admission.     Consults:   Consults (From admission, onward)          Status Ordering Provider     Inpatient consult to Skin Integrity  Practitioner  Once        Provider:  Angela Valero NP    Completed EDGARDO LEY     Inpatient consult to Interventional Radiology  Once        Provider:  (Not yet assigned)    Completed SULTANLARISA     Inpatient consult to Cardiology  Once        Provider:  (Not yet assigned)    Completed SULTAN, MOHAMMAD     Inpatient consult to Midline team  Once        Provider:  (Not yet assigned)    Completed SULTAN, MOHAMMAD     Inpatient consult to Hepatology  Once        Provider:  (Not yet assigned)    Completed SULTAN, MOHAMMAD     Inpatient consult to Social Work  Once        Provider:  (Not yet assigned)    Acknowledged SULTAN, MOHAMMAD     Inpatient consult to Hospitalist  Once        Provider:  (Not yet assigned)    Completed GRISEL SWARTZ new Assessment & Plan notes have been filed under this hospital service since the last note was generated.  Service: Hospital Medicine    Final Active Diagnoses:    Diagnosis Date Noted POA    PRINCIPAL PROBLEM:  Anasarca [R60.1] 07/27/2024 Yes    Hyponatremia [E87.1] 08/12/2024 Yes    Irritant contact dermatitis due to fecal, urinary or dual incontinence [L24.A2] 08/07/2024 No    Stage 3b chronic kidney disease [N18.32] 12/29/2023 Yes    Volume overload [E87.70] 05/10/2022 Yes    Type 2 diabetes mellitus with diabetic polyneuropathy, without long-term current use of insulin [E11.42] 06/03/2020 Yes     Chronic    Atrial Fibrillation (paroxysmal) [I48.0] 02/26/2020 Yes    Essential hypertension [I10] 10/12/2015 Yes      Problems Resolved During this Admission:       Discharged Condition: stable    Disposition: Skilled Nursing Facility    Follow Up:   Follow-up Information       [facility], [facility] Follow up.    Contact information:  [address] [place] [address]  [phone]              Hepatology Follow up.    Why: A message was sent to the Hepatology clinic, the clinic nurse will contact you to schedule a hospital follow up visit. However, if you do not hear from them within 24 to 48 hours of discharge, please call to schedule the appointment.  Contact information:  Hepatology  [phone]                         Patient Instructions:      IR Paracentesis with Imaging   Standing Status: Future Standing Exp. Date: 08/12/25     Order Specific Question Answer Comments   Is this Procedure Therapeutic or Diagnostic? Therapeutic    Has the patient had a prior contrast or iodine reaction? No    Is the patient currently on any blood thinners like Aspirin, Coumadin, or Plavix? No    Is the patient on any Metformin drug, such as Glucophage or Glucovance? No    May the Radiologist modify the order per protocol to meet the clinical needs of the patient? Yes    Release to patient Immediate      COMPREHENSIVE METABOLIC PANEL   Standing Status: Future Standing Exp. Date: 11/10/25     Order Specific Question Answer Comments   Send normal result to authorizing provider's In Basket if patient is active on MyChart: Yes      Ambulatory referral/consult to Hepatology   Standing Status: Future   Referral Priority: Routine Referral Type: Consultation   Referral Reason: Specialty Services Required   Requested Specialty: Hepatology   Number of Visits Requested: 1       Significant Diagnostic Studies: All reviewed    Pending Diagnostic Studies:       None           Medications:  Reconciled Home Medications:      Medication List        START taking these medications      lactulose 10 gram/15 mL solution  Commonly known as: CHRONULAC  Take 15 mLs (10 g total) by mouth 2 (two) times daily. Please ensure you are having at least 2-3 bowel movements every day.            CHANGE how you take these medications      acetaminophen 500 MG tablet  Commonly known as: TYLENOL  Take 2 tablets (1,000 mg total) by mouth every  evening.  What changed:   when to take this  reasons to take this  additional instructions     albuterol 90 mcg/actuation inhaler  Commonly known as: VENTOLIN HFA  Inhale 2 puffs into the lungs every 6 (six) hours as needed for Wheezing. Rescue  What changed:   when to take this  reasons to take this     DULoxetine 60 MG capsule  Commonly known as: CYMBALTA  Take 1 capsule (60 mg total) by mouth once daily.  What changed: how much to take     lisinopriL 40 MG tablet  Commonly known as: PRINIVIL,ZESTRIL  Take 1 tablet (40 mg total) by mouth once daily.  Notes to patient: HOLD until follow-up with PCP      NIFEdipine 60 MG (OSM) 24 hr tablet  Commonly known as: PROCARDIA-XL  Take 1 tablet (60 mg total) by mouth once daily.  Notes to patient: HOLD until follow-up with PCP      omeprazole 20 MG capsule  Commonly known as: PRILOSEC  TAKE 1 CAPSULE BY MOUTH ONCE DAILY  What changed:   how much to take  when to take this  reasons to take this            CONTINUE taking these medications      ALPRAZolam 0.5 MG tablet  Commonly known as: XANAX  Take 1 tablet (0.5 mg total) by mouth daily as needed for Anxiety.     b complex vitamins capsule  Take 1 capsule by mouth every other day.     metoprolol succinate 100 MG 24 hr tablet  Commonly known as: TOPROL-XL  Take 1 tablet (100 mg total) by mouth 2 (two) times daily.     multivitamin per tablet  Commonly known as: THERAGRAN  Take 1 tablet by mouth once daily.            STOP taking these medications      aspirin 81 MG EC tablet  Commonly known as: ECOTRIN     atorvastatin 80 MG tablet  Commonly known as: LIPITOR     colchicine 0.6 mg tablet  Commonly known as: COLCRYS              Indwelling Lines/Drains at time of discharge:   Lines/Drains/Airways       Drain  Duration             Female External Urinary Catheter w/ Suction 07/27/24 0035 17 days                    Time spent on the discharge of patient: 45 minutes         Brennon Nunez MD  Department of Hospital  Medicine  [facility] ([place])

## 2024-08-13 NOTE — NURSING
Patient has orders to D/C as well as orders for paracentesis. Transport at the bedside. Charge nurse & and day shift nurse notified to clarify orders.

## 2024-08-20 ENCOUNTER — TELEPHONE (OUTPATIENT)
Dept: HEPATOLOGY | Facility: CLINIC | Age: 59
End: 2024-08-20
Payer: MEDICAID

## 2024-08-20 ENCOUNTER — HOSPITAL ENCOUNTER (OUTPATIENT)
Dept: INTERVENTIONAL RADIOLOGY/VASCULAR | Facility: HOSPITAL | Age: 59
Discharge: HOME | End: 2024-08-20
Payer: MEDICAID

## 2024-08-20 VITALS
TEMPERATURE: 98 F | DIASTOLIC BLOOD PRESSURE: 48 MMHG | HEART RATE: 56 BPM | OXYGEN SATURATION: 99 % | SYSTOLIC BLOOD PRESSURE: 75 MMHG | RESPIRATION RATE: 20 BRPM

## 2024-08-20 DIAGNOSIS — K76.0 NAFLD (NONALCOHOLIC FATTY LIVER DISEASE): Primary | Chronic | ICD-10-CM

## 2024-08-20 DIAGNOSIS — R60.1 ANASARCA: ICD-10-CM

## 2024-08-20 DIAGNOSIS — K76.0 NAFLD (NONALCOHOLIC FATTY LIVER DISEASE): Chronic | ICD-10-CM

## 2024-08-20 NOTE — PLAN OF CARE
Procedure cancel, PA talked with pt about her b/p and reschedule to Friday, transportation called, pt wheelchair back to waiting area

## 2024-08-20 NOTE — PROGRESS NOTES
"Patient arrived from skilled nursing facility. Patient is hypotensive (77/48). Unable to complete a therapeutic paracentesis at this time. Patient reports that SNF gave her blood pressure meds this AM. My suggestion is she discuss with SNF doctor managing her care and her BP be taken prior to giving meds. Patient is asymptomatic. She did not drive to this appointment. She will return to SNF discuss hypotension and meds.Plan to proceed with paracentesis on Friday.     Discussed if she started to experience any dizziness, confusion, blurry vision she should notify SNF staff and report for ED evaluation.     She declines ED evaluation at this point. She reports "it has always been low."    Shannen Callahan PA-C  Interventional Radiology  [phone]        "

## 2024-08-20 NOTE — TELEPHONE ENCOUNTER
----- Message from Clair Wasserman sent at 8/20/2024  8:14 AM CDT -----  Regarding: RE: New Hepatology patient discharge  Good morning,  I've ordered the labs.   Thanks so much!  ----- Message -----  From: Radha Tate MA  Sent: 8/16/2024   2:58 PM CDT  To: Clair Wasserman  Subject: FW: New Hepatology patient discharge             Good afternoon Ms. Self, patient is scheduled with Dr. Massey 9/18/2024 @ 3 pm  ----- Message -----  From: Clair Wasserman  Sent: 8/15/2024   3:04 PM CDT  To: Angela Troy RN; Charles Vargas MA; #  Subject: New Hepatology patient discharge                 Good afternoon,  This patient discharged to a SNF on 8/13 and will need to establish with a hepatologist in 3 weeks or so please.  Once the appointment is set, I'll order the labs for the day of the appointment.  Thank you!

## 2024-08-20 NOTE — PLAN OF CARE
Pt arrived via wheel chair, pt connected to monitor, b/p 75/48 hr 58, safety/comfort measure implemented

## 2024-08-23 ENCOUNTER — HOSPITAL ENCOUNTER (OUTPATIENT)
Dept: INTERVENTIONAL RADIOLOGY/VASCULAR | Facility: HOSPITAL | Age: 59
Discharge: HOME | End: 2024-08-23
Payer: MEDICAID

## 2024-08-23 ENCOUNTER — PATIENT MESSAGE (OUTPATIENT)
Dept: BARIATRICS | Facility: CLINIC | Age: 59
End: 2024-08-23
Payer: MEDICAID

## 2024-08-23 PROCEDURE — 49083 ABD PARACENTESIS W/IMAGING: CPT | Performed by: RADIOLOGY

## 2024-08-23 PROCEDURE — 49083 ABD PARACENTESIS W/IMAGING: CPT | Mod: ,,,

## 2024-08-23 PROCEDURE — P9047 ALBUMIN (HUMAN), 25%, 50ML: HCPCS | Mod: JZ,JG

## 2024-08-23 PROCEDURE — C1729 CATH, DRAINAGE: HCPCS

## 2024-08-23 PROCEDURE — 63600175 PHARM REV CODE 636 W HCPCS: Mod: JZ,JG

## 2024-08-23 RX ORDER — ALBUMIN HUMAN 250 G/1000ML
SOLUTION INTRAVENOUS
Status: COMPLETED
Start: 2024-08-23 | End: 2024-08-23

## 2024-08-23 RX ADMIN — ALBUMIN (HUMAN) 50 G: 12.5 SOLUTION INTRAVENOUS at 01:08

## 2024-08-23 NOTE — H&P
Radiology History & Physical      SUBJECTIVE:     Chief Complaint: Abdominal Distension    History of Present Illness:  Vicky Alvarado is a 59 y.o. female who presents for US guided paracentesis.     Past Medical History:   Diagnosis Date    Anticoagulant long-term use     Arthritis     Atrial fibrillation     cardioversion    Atrial fibrillation with RVR     HAS WATCHMAN IN PLACE NOW    Avascular necrosis     L hand    CHF (congestive heart failure)     Chronic fatigue     Depression     Diabetes mellitus     Encounter for blood transfusion 07/22/2020    Encounter for blood transfusion 03/2022    Fatty liver     GERD (gastroesophageal reflux disease)     Hx of psychiatric care     Hypertension     Iron deficiency anemia 05/07/2022    TIBC 444 with saturated iron 8    Liver disease     Obese     Pre-diabetes 05/07/2022    Psychiatric problem     Sleep apnea     wears cpap    SOB (shortness of breath) on exertion     Weight loss     75lb intentional weight loss     Past Surgical History:   Procedure Laterality Date    ABLATION OF ARRHYTHMOGENIC FOCUS FOR ATRIAL FIBRILLATION N/A 07/20/2020    Procedure: Ablation atrial fibrillation;  Surgeon: Gabriel Hawley MD;  Location: [facility] EP LAB;  Service: Cardiology;  Laterality: N/A;  afib, PVI, RFA, REENA, SHADY, anes, MB, 3 Prep    ABLATION OF ARRHYTHMOGENIC FOCUS FOR ATRIAL FIBRILLATION N/A 01/24/2022    Procedure: Ablation atrial fibrillation;  Surgeon: Gabriel Hawley MD;  Location: [facility] EP LAB;  Service: Cardiology;  Laterality: N/A;  afib/afl, PVI (re-do)/CTI, RFA, REENA (cx if SR), SHADY, anes, MB, 3 Prep    APPLICATION OF WOUND VACUUM-ASSISTED CLOSURE DEVICE Left 08/03/2020    Procedure: APPLICATION, WOUND VAC;  Surgeon: SEMAJ Márquez III, MD;  Location: [facility] OR [address] [place] [address];  Service: Peripheral Vascular;  Laterality: Left;    APPLICATION OF WOUND VACUUM-ASSISTED CLOSURE DEVICE Left 08/06/2020    Procedure: APPLICATION, WOUND VAC;  Surgeon: SEMAJ Márquez III  MD;  [address]  Service: Peripheral Vascular;  Laterality: Left;    CARPAL TUNNEL RELEASE Right 06/10/2020    Procedure: RELEASE, CARPAL TUNNEL - RIGHT;  Surgeon: Adelaida Hall MD;  Location: [facility] OR;  Service: Orthopedics;  Laterality: Right;  GENERAL AND REGIONAL    CARPAL TUNNEL RELEASE Left 05/05/2021    Procedure: RELEASE, CARPAL TUNNEL, LEFT;  Surgeon: Adelaida Hall MD;  Location: [facility] OR;  Service: Orthopedics;  Laterality: Left;  GENERAL & REGIONAL IN PACU    CARPECTOMY Left 9/6/2023    Procedure: CARPECTOMY;  Surgeon: Adelaida Hall MD;  Location: [facility] OR;  Service: Orthopedics;  Laterality: Left;  MAC/REGIONAL  LEFT PROXIMAL ROW    CLOSURE OF WOUND Left 08/06/2020    Procedure: CLOSURE, WOUND;  Surgeon: SEMAJ Márquez III, MD;  Location: [address];  Service: Peripheral Vascular;  Laterality: Left;  Complex    COLONOSCOPY N/A 08/17/2021    Procedure: COLONOSCOPY Suprep;  Surgeon: Loco Deluca MD;  Location: [facility];  Service: Endoscopy;  Laterality: N/A;    ECHOCARDIOGRAM,TRANSESOPHAGEAL N/A 07/24/2023    Procedure: Transesophageal echo (REENA) intra-procedure log documentation;  Surgeon: Leyla Diagnostic Provider;  Location: [facility] EP LAB;  Service: Cardiology;  Laterality: N/A;  s/p WM, REENA, anes, 3 Prep    ESOPHAGOGASTRODUODENOSCOPY N/A 08/17/2021    Procedure: EGD (ESOPHAGOGASTRODUODENOSCOPY);  Surgeon: Loco Deluca MD;  Location: [facility];  Service: Endoscopy;  Laterality: N/A;  Patient is schedule to have her Covid test on 08/14/2021 at the [facility] @ 9:30am. [place].    EXPLORATION OF FEMORAL ARTERY Left 07/21/2020    Procedure: EXPLORATION, ARTERY, FEMORAL;  Surgeon: SEMAJ Márquez III, MD;  Location: [address];  Service: Peripheral Vascular;  Laterality: Left;  1. Urgent Direct repair, L SFA branch laceration    FOOT SURGERY      HYSTERECTOMY      HYSTEROSCOPY WITH DILATION AND CURETTAGE OF UTERUS N/A 02/19/2022     Procedure: HYSTEROSCOPY, WITH DILATION AND CURETTAGE OF UTERUS;  Surgeon: Shane Palomo MD;  [address];  Service: OB/GYN;  Laterality: N/A;    INCISION AND DRAINAGE OF KNEE Left 05/12/2022    Procedure: INCISION AND DRAINAGE, Prepatellar bursectomy.;  Surgeon: Dre Guzmán MD;  [address];  Service: Orthopedics;  Laterality: Left;    LEFT HEART CATHETERIZATION Left 02/07/2023    Procedure: Left heart cath;  Surgeon: Josiah Terry MD;  Location: [facility] CATH LAB;  Service: Cardiology;  Laterality: Left;  borderline moderate bleeding risk 6.3%    OCCLUSION OF LEFT ATRIAL APPENDAGE N/A 06/12/2023    Procedure: Left atrial appendage occlusion;  Surgeon: Gabriel Hawley MD;  Location: [facility] EP LAB;  Service: Cardiology;  Laterality: N/A;  afib, watchman, BSCI, demi, anes, MB, 3prep    RELEASE OF ULNAR NERVE AT CUBITAL TUNNEL Bilateral     RIGHT HEART CATHETERIZATION Right 8/5/2024    Procedure: INSERTION, CATHETER, RIGHT HEART;  Surgeon: Zane Liu MD;  Location: [facility] CATH LAB;  Service: Cardiology;  Laterality: Right;    ROBOT-ASSISTED LAPAROSCOPIC ABDOMINAL HYSTERECTOMY USING DA KEIRY XI N/A 08/09/2022    Procedure: XI ROBOTIC HYSTERECTOMY;  Surgeon: Yolanda Barriga MD;  Location: [facility];  Service: OB/GYN;  Laterality: N/A;  dual console requested    ROBOT-ASSISTED LAPAROSCOPIC SALPINGO-OOPHORECTOMY Bilateral 08/09/2022    Procedure: ROBOTIC SALPINGO-OOPHORECTOMY;  Surgeon: Yolanda Barriga MD;  Location: [facility];  Service: OB/GYN;  Laterality: Bilateral;    TRANSESOPHAGEAL ECHOCARDIOGRAPHY N/A 06/12/2023    Procedure: ECHOCARDIOGRAM, TRANSESOPHAGEAL;  Surgeon: Cyril Mast MD;  Location: [facility] EP LAB;  Service: Cardiology;  Laterality: N/A;    TREATMENT OF CARDIAC ARRHYTHMIA N/A 01/29/2020    Procedure: CARDIOVERSION;  Surgeon: Gabriel Hawley MD;  Location: [facility] EP LAB;  Service: Cardiology;  Laterality: N/A;  af, demi, dccv, anes, mb, 345    TREATMENT OF  CARDIAC ARRHYTHMIA  01/24/2022    Procedure: Cardioversion or Defibrillation;  Surgeon: Gabriel Hawley MD;  Location: [facility] EP LAB;  Service: Cardiology;;    WOUND DEBRIDEMENT Left 08/06/2020    Procedure: DEBRIDEMENT, WOUND;  Surgeon: SEMAJ Márquez III, MD;  Location: [facility] OR [address] [place] [address];  Service: Peripheral Vascular;  Laterality: Left;       Home Meds:   Prior to Admission medications    Medication Sig Start Date End Date Taking? Authorizing Provider   acetaminophen (TYLENOL) 500 MG tablet Take 2 tablets (1,000 mg total) by mouth every evening. 8/12/24   Brennon Nunez MD   albuterol (VENTOLIN HFA) 90 mcg/actuation inhaler Inhale 2 puffs into the lungs every 6 (six) hours as needed for Wheezing. Rescue 12/14/23 12/13/24  Gordy Cordero MD   ALPRAZolam (XANAX) 0.5 MG tablet Take 1 tablet (0.5 mg total) by mouth daily as needed for Anxiety. 8/12/24   Brennon Nunez MD   b complex vitamins capsule Take 1 capsule by mouth every other day.    Provider, Historical   DULoxetine (CYMBALTA) 60 MG capsule Take 1 capsule (60 mg total) by mouth once daily. 8/12/24   Brennon Nunez MD   lactulose (CHRONULAC) 10 gram/15 mL solution Take 15 mLs (10 g total) by mouth 2 (two) times daily. Please ensure you are having at least 2-3 bowel movements every day. 8/12/24   Brennon Nunez MD   lisinopriL (PRINIVIL,ZESTRIL) 40 MG tablet Take 1 tablet (40 mg total) by mouth once daily. 6/27/24 6/27/25  Gordy Cordero MD   metoprolol succinate (TOPROL-XL) 100 MG 24 hr tablet Take 1 tablet (100 mg total) by mouth 2 (two) times daily. 1/17/24   Marah Hunter NP   multivitamin (THERAGRAN) per tablet Take 1 tablet by mouth once daily.    Provider, Historical   NIFEdipine (PROCARDIA-XL) 60 MG (OSM) 24 hr tablet Take 1 tablet (60 mg total) by mouth once daily. 7/25/24 2/20/25  Gordy Cordero MD   omeprazole (PRILOSEC) 20 MG capsule TAKE 1 CAPSULE BY MOUTH ONCE DAILY 7/25/24   Gordy Cordero MD   medroxyPROGESTERone  (PROVERA) 10 MG tablet Take 2 tablets (20 mg total) by mouth 3 (three) times daily. 7/13/22 8/9/22  Yolanda Barriga MD   pantoprazole (PROTONIX) 40 MG tablet Take 1 tablet (40 mg total) by mouth once daily.  Patient not taking: Reported on 2/18/2022 1/25/22 2/27/22  Geovanna Garcia MD     Anticoagulants/Antiplatelets: no anticoagulation    Allergies:   Review of patient's allergies indicates:   Allergen Reactions    Flecainide Shortness Of Breath and Swelling    Shellfish containing products Other (See Comments)     Makes gout terrible    Vancomycin analogues Hives, Itching and Rash     Sedation History:  no adverse reactions    Review of Systems:   Hematological: no known coagulopathies  Respiratory: no shortness of breath  Cardiovascular: no chest pain  Gastrointestinal: no abdominal pain  Genito-Urinary: no dysuria  Musculoskeletal: negative  Neurological: no TIA or stroke symptoms         OBJECTIVE:     Vital Signs (Most Recent)       Physical Exam:  ASA: 2  Mallampati: n/a    General: no acute distress  Mental Status: alert and oriented to person, place and time  HEENT: normocephalic, atraumatic  Chest: unlabored breathing  Heart: regular heart rate  Abdomen: distended  Extremity: moves all extremities    ASSESSMENT/PLAN:     Sedation Plan: Local  Patient will undergo US guided paracentesis.    Shannen Callahan PA-C  Interventional Radiology  [phone]

## 2024-08-23 NOTE — PROCEDURES
Radiology Post-Procedure Note    Pre Op Diagnosis: Ascites  Post Op Diagnosis: Same    Procedure: Ultrasound Guided Paracentesis    Procedure performed by: Shannen Callahan PA-C    Written Informed Consent Obtained: Yes  Specimen Removed: YES (yellow, clear)  Estimated Blood Loss: Minimal    Findings:   Successful paracentesis from LLQ.  Albumin administered PRN per protocol.    Patient tolerated procedure well.    Shannen Callahan PA-C  Interventional Radiology  [phone]

## 2024-08-26 ENCOUNTER — TELEPHONE (OUTPATIENT)
Dept: HEPATOLOGY | Facility: CLINIC | Age: 59
End: 2024-08-26
Payer: MEDICAID

## 2024-08-26 NOTE — TELEPHONE ENCOUNTER
Spoke with patient.She was in the hospital. Dr Saba's patient. Last visit in 2023.  She has hospital f/u with Dr Massey in Sept, but wanted a sooner appt.  No sooner appts with Dr Massey or Dr Saba.

## 2024-09-03 DIAGNOSIS — I48.0 PAF (PAROXYSMAL ATRIAL FIBRILLATION): Primary | ICD-10-CM

## 2024-09-04 PROBLEM — F41.9 ANXIETY: Status: ACTIVE | Noted: 2024-09-04

## 2024-09-04 PROBLEM — G89.4 PAIN SYNDROME, CHRONIC: Status: ACTIVE | Noted: 2024-09-04

## 2024-09-09 ENCOUNTER — TELEPHONE (OUTPATIENT)
Dept: HEPATOLOGY | Facility: CLINIC | Age: 59
End: 2024-09-09
Payer: MEDICAID

## 2024-09-09 NOTE — TELEPHONE ENCOUNTER
Patient was reschedule on 9/10/2024 for lab and follow up visit.She was also schedule for her PARA on 9/11/2024.Established patient for .

## 2024-09-09 NOTE — TELEPHONE ENCOUNTER
----- Message from Minerva Barnes sent at 9/6/2024  2:37 PM CDT -----  Regarding: RE: Consult/Advisory  Contact: Vicky Alvarado  Please have provider enter order to be schedule  ----- Message -----  From: Jade Galindo MA  Sent: 9/5/2024   1:23 PM CDT  To: [facility] Interventional Radiology Schedulers  Subject: FW: Consult/Advisory                             Hello there,   Can you please assist patient to schedule her PARA.Patient stated that she is due once a week.    Thank you,  Jade  ----- Message -----  From: Charles Vargas MA  Sent: 9/5/2024   1:17 PM CDT  To: [facility] Staff  Subject: FW: Consult/Advisory                               ----- Message -----  From: Oksana Underwood  Sent: 9/5/2024   1:08 PM CDT  To: Bryson Sullivan Staff  Subject: Consult/Advisory                                    Consult/Advisory     Name Of Caller:Vicky Alvarado         Contact Preference:[phone] (home) [phone] (work)      Nature of call:Patient is calling to schedule her PARACENTESIS , she states she's due once week and she wont make it till appt. Requesting a call back

## 2024-09-10 ENCOUNTER — PATIENT MESSAGE (OUTPATIENT)
Dept: ADMINISTRATIVE | Facility: OTHER | Age: 59
End: 2024-09-10
Payer: MEDICAID

## 2024-09-10 ENCOUNTER — LAB VISIT (OUTPATIENT)
Dept: LAB | Facility: HOSPITAL | Age: 59
End: 2024-09-10
Attending: INTERNAL MEDICINE
Payer: MEDICAID

## 2024-09-10 ENCOUNTER — OFFICE VISIT (OUTPATIENT)
Dept: HEPATOLOGY | Facility: CLINIC | Age: 59
End: 2024-09-10
Attending: INTERNAL MEDICINE
Payer: MEDICAID

## 2024-09-10 ENCOUNTER — HOSPITAL ENCOUNTER (INPATIENT)
Facility: HOSPITAL | Age: 59
LOS: 8 days | Discharge: HOME | DRG: 433 | End: 2024-09-18
Attending: EMERGENCY MEDICINE | Admitting: HOSPITALIST
Payer: MEDICAID

## 2024-09-10 VITALS
HEIGHT: 65 IN | DIASTOLIC BLOOD PRESSURE: 55 MMHG | SYSTOLIC BLOOD PRESSURE: 110 MMHG | HEART RATE: 76 BPM | BODY MASS INDEX: 30.32 KG/M2 | RESPIRATION RATE: 18 BRPM | TEMPERATURE: 97 F | WEIGHT: 182 LBS

## 2024-09-10 DIAGNOSIS — K74.60 DECOMPENSATED HEPATIC CIRRHOSIS: ICD-10-CM

## 2024-09-10 DIAGNOSIS — K76.0 NAFLD (NONALCOHOLIC FATTY LIVER DISEASE): Chronic | ICD-10-CM

## 2024-09-10 DIAGNOSIS — R18.8 CIRRHOSIS OF LIVER WITH ASCITES, UNSPECIFIED HEPATIC CIRRHOSIS TYPE: Chronic | ICD-10-CM

## 2024-09-10 DIAGNOSIS — R74.8 ELEVATED LIVER ENZYMES: Chronic | ICD-10-CM

## 2024-09-10 DIAGNOSIS — R18.8 OTHER ASCITES: Chronic | ICD-10-CM

## 2024-09-10 DIAGNOSIS — K74.60 CIRRHOSIS OF LIVER WITH ASCITES, UNSPECIFIED HEPATIC CIRRHOSIS TYPE: Chronic | ICD-10-CM

## 2024-09-10 DIAGNOSIS — K76.0 METABOLIC DYSFUNCTION-ASSOCIATED STEATOTIC LIVER DISEASE (MASLD): Primary | ICD-10-CM

## 2024-09-10 DIAGNOSIS — R60.1 ANASARCA: ICD-10-CM

## 2024-09-10 DIAGNOSIS — K72.90 DECOMPENSATED HEPATIC CIRRHOSIS: ICD-10-CM

## 2024-09-10 DIAGNOSIS — R18.8 ASCITES: ICD-10-CM

## 2024-09-10 DIAGNOSIS — R53.81 DEBILITY: Primary | ICD-10-CM

## 2024-09-10 DIAGNOSIS — K74.69 OTHER CIRRHOSIS OF LIVER: Chronic | ICD-10-CM

## 2024-09-10 DIAGNOSIS — I48.0 PAF (PAROXYSMAL ATRIAL FIBRILLATION): Chronic | ICD-10-CM

## 2024-09-10 PROBLEM — L03.119 CELLULITIS OF EXTREMITY: Status: RESOLVED | Noted: 2022-05-07 | Resolved: 2024-09-10

## 2024-09-10 PROBLEM — Z98.890 S/P ROBOT-ASSISTED SURGICAL PROCEDURE: Chronic | Status: ACTIVE | Noted: 2022-08-09

## 2024-09-10 PROBLEM — K21.9 GASTROESOPHAGEAL REFLUX DISEASE WITHOUT ESOPHAGITIS: Chronic | Status: ACTIVE | Noted: 2022-02-24

## 2024-09-10 PROBLEM — D50.9 IRON DEFICIENCY ANEMIA: Chronic | Status: ACTIVE | Noted: 2022-05-07

## 2024-09-10 PROBLEM — F33.41 RECURRENT MAJOR DEPRESSIVE DISORDER, IN PARTIAL REMISSION: Chronic | Status: ACTIVE | Noted: 2022-02-24

## 2024-09-10 PROBLEM — G89.4 PAIN SYNDROME, CHRONIC: Chronic | Status: ACTIVE | Noted: 2024-09-04

## 2024-09-10 PROBLEM — D69.6 THROMBOCYTOPENIA: Status: RESOLVED | Noted: 2022-02-24 | Resolved: 2024-09-10

## 2024-09-10 PROBLEM — F41.9 ANXIETY: Chronic | Status: ACTIVE | Noted: 2024-09-04

## 2024-09-10 PROBLEM — I25.118 CORONARY ARTERY DISEASE OF NATIVE ARTERY OF NATIVE HEART WITH STABLE ANGINA PECTORIS: Chronic | Status: ACTIVE | Noted: 2024-01-16

## 2024-09-10 PROBLEM — R73.03 PRE-DIABETES: Status: RESOLVED | Noted: 2022-05-07 | Resolved: 2024-09-10

## 2024-09-10 PROBLEM — K76.6 PORTAL HYPERTENSION: Chronic | Status: ACTIVE | Noted: 2023-12-29

## 2024-09-10 PROBLEM — L03.116 LEFT LEG CELLULITIS: Status: RESOLVED | Noted: 2022-05-07 | Resolved: 2024-09-10

## 2024-09-10 PROBLEM — N18.32 STAGE 3B CHRONIC KIDNEY DISEASE: Chronic | Status: ACTIVE | Noted: 2023-12-29

## 2024-09-10 PROBLEM — M17.0 PRIMARY OSTEOARTHRITIS OF BOTH KNEES: Chronic | Status: ACTIVE | Noted: 2023-07-30

## 2024-09-10 PROBLEM — I50.33 ACUTE ON CHRONIC DIASTOLIC HEART FAILURE: Status: RESOLVED | Noted: 2024-07-27 | Resolved: 2024-09-10

## 2024-09-10 PROBLEM — D62 ACUTE POST-HEMORRHAGIC ANEMIA: Status: RESOLVED | Noted: 2022-02-25 | Resolved: 2024-09-10

## 2024-09-10 LAB
AFP SERPL-MCNC: 2 NG/ML (ref 0–8.4)
ALBUMIN SERPL BCP-MCNC: 2.3 G/DL (ref 3.5–5.2)
ALBUMIN SERPL BCP-MCNC: 2.4 G/DL (ref 3.5–5.2)
ALP SERPL-CCNC: 265 U/L (ref 55–135)
ALP SERPL-CCNC: 278 U/L (ref 55–135)
ALT SERPL W/O P-5'-P-CCNC: 7 U/L (ref 10–44)
ALT SERPL W/O P-5'-P-CCNC: 9 U/L (ref 10–44)
ANION GAP SERPL CALC-SCNC: 10 MMOL/L (ref 8–16)
ANION GAP SERPL CALC-SCNC: 9 MMOL/L (ref 8–16)
AST SERPL-CCNC: 22 U/L (ref 10–40)
AST SERPL-CCNC: 22 U/L (ref 10–40)
BASOPHILS # BLD AUTO: 0.05 K/UL (ref 0–0.2)
BASOPHILS NFR BLD: 0.5 % (ref 0–1.9)
BILIRUB SERPL-MCNC: 0.6 MG/DL (ref 0.1–1)
BILIRUB SERPL-MCNC: 0.6 MG/DL (ref 0.1–1)
BNP SERPL-MCNC: 184 PG/ML (ref 0–99)
BUN SERPL-MCNC: 41 MG/DL (ref 6–30)
BUN SERPL-MCNC: 44 MG/DL (ref 6–20)
BUN SERPL-MCNC: 44 MG/DL (ref 6–20)
CALCIUM SERPL-MCNC: 10.2 MG/DL (ref 8.7–10.5)
CALCIUM SERPL-MCNC: 9.5 MG/DL (ref 8.7–10.5)
CHLORIDE SERPL-SCNC: 106 MMOL/L (ref 95–110)
CO2 SERPL-SCNC: 21 MMOL/L (ref 23–29)
CO2 SERPL-SCNC: 22 MMOL/L (ref 23–29)
CREAT SERPL-MCNC: 1 MG/DL (ref 0.5–1.4)
CREAT SERPL-MCNC: 1.1 MG/DL (ref 0.5–1.4)
CREAT SERPL-MCNC: 1.2 MG/DL (ref 0.5–1.4)
DIFFERENTIAL METHOD BLD: ABNORMAL
EOSINOPHIL # BLD AUTO: 0.1 K/UL (ref 0–0.5)
EOSINOPHIL NFR BLD: 0.7 % (ref 0–8)
ERYTHROCYTE [DISTWIDTH] IN BLOOD BY AUTOMATED COUNT: 17.1 % (ref 11.5–14.5)
EST. GFR  (NO RACE VARIABLE): 57.5 ML/MIN/1.73 M^2
EST. GFR  (NO RACE VARIABLE): >60 ML/MIN/1.73 M^2
GLUCOSE SERPL-MCNC: 96 MG/DL (ref 70–110)
GLUCOSE SERPL-MCNC: 96 MG/DL (ref 70–110)
GLUCOSE SERPL-MCNC: 99 MG/DL (ref 70–110)
HCT VFR BLD AUTO: 24.9 % (ref 37–48.5)
HCT VFR BLD CALC: 27 %PCV (ref 36–54)
HGB BLD-MCNC: 7.8 G/DL (ref 12–16)
IMM GRANULOCYTES # BLD AUTO: 0.04 K/UL (ref 0–0.04)
IMM GRANULOCYTES NFR BLD AUTO: 0.4 % (ref 0–0.5)
INR PPP: 1 (ref 0.8–1.2)
LYMPHOCYTES # BLD AUTO: 0.6 K/UL (ref 1–4.8)
LYMPHOCYTES NFR BLD: 6.2 % (ref 18–48)
MCH RBC QN AUTO: 28.5 PG (ref 27–31)
MCHC RBC AUTO-ENTMCNC: 31.3 G/DL (ref 32–36)
MCV RBC AUTO: 91 FL (ref 82–98)
MONOCYTES # BLD AUTO: 0.4 K/UL (ref 0.3–1)
MONOCYTES NFR BLD: 4.3 % (ref 4–15)
NEUTROPHILS # BLD AUTO: 9.1 K/UL (ref 1.8–7.7)
NEUTROPHILS NFR BLD: 87.9 % (ref 38–73)
NRBC BLD-RTO: 0 /100 WBC
PLATELET # BLD AUTO: 234 K/UL (ref 150–450)
PMV BLD AUTO: 10.2 FL (ref 9.2–12.9)
POC IONIZED CALCIUM: 1.28 MMOL/L (ref 1.06–1.42)
POC TCO2 (MEASURED): 21 MMOL/L (ref 23–29)
POCT GLUCOSE: 103 MG/DL (ref 70–110)
POTASSIUM BLD-SCNC: 3.6 MMOL/L (ref 3.5–5.1)
POTASSIUM SERPL-SCNC: 3.5 MMOL/L (ref 3.5–5.1)
POTASSIUM SERPL-SCNC: 3.5 MMOL/L (ref 3.5–5.1)
PROT SERPL-MCNC: 6.4 G/DL (ref 6–8.4)
PROT SERPL-MCNC: 6.7 G/DL (ref 6–8.4)
PROTHROMBIN TIME: 11.1 SEC (ref 9–12.5)
RBC # BLD AUTO: 2.74 M/UL (ref 4–5.4)
SAMPLE: ABNORMAL
SODIUM BLD-SCNC: 137 MMOL/L (ref 136–145)
SODIUM SERPL-SCNC: 137 MMOL/L (ref 136–145)
SODIUM SERPL-SCNC: 137 MMOL/L (ref 136–145)
WBC # BLD AUTO: 10.29 K/UL (ref 3.9–12.7)

## 2024-09-10 PROCEDURE — 36415 COLL VENOUS BLD VENIPUNCTURE: CPT | Performed by: INTERNAL MEDICINE

## 2024-09-10 PROCEDURE — 99215 OFFICE O/P EST HI 40 MIN: CPT | Mod: PBBFAC | Performed by: INTERNAL MEDICINE

## 2024-09-10 PROCEDURE — 83880 ASSAY OF NATRIURETIC PEPTIDE: CPT | Performed by: INTERNAL MEDICINE

## 2024-09-10 PROCEDURE — 80053 COMPREHEN METABOLIC PANEL: CPT | Mod: 91

## 2024-09-10 PROCEDURE — 85025 COMPLETE CBC W/AUTO DIFF WBC: CPT | Performed by: INTERNAL MEDICINE

## 2024-09-10 PROCEDURE — 85610 PROTHROMBIN TIME: CPT

## 2024-09-10 PROCEDURE — 25000003 PHARM REV CODE 250: Performed by: HOSPITALIST

## 2024-09-10 PROCEDURE — 99285 EMERGENCY DEPT VISIT HI MDM: CPT | Mod: 27

## 2024-09-10 PROCEDURE — 82105 ALPHA-FETOPROTEIN SERUM: CPT | Performed by: INTERNAL MEDICINE

## 2024-09-10 PROCEDURE — 99999 PR PBB SHADOW E&M-EST. PATIENT-LVL V: CPT | Mod: PBBFAC,,, | Performed by: INTERNAL MEDICINE

## 2024-09-10 PROCEDURE — 20600001 HC STEP DOWN PRIVATE ROOM

## 2024-09-10 PROCEDURE — 80053 COMPREHEN METABOLIC PANEL: CPT | Performed by: INTERNAL MEDICINE

## 2024-09-10 RX ORDER — LANOLIN ALCOHOL/MO/W.PET/CERES
1 CREAM (GRAM) TOPICAL DAILY
Status: DISCONTINUED | OUTPATIENT
Start: 2024-09-11 | End: 2024-09-18 | Stop reason: HOSPADM

## 2024-09-10 RX ORDER — LACTULOSE 10 G/15ML
10 SOLUTION ORAL 2 TIMES DAILY
Status: DISCONTINUED | OUTPATIENT
Start: 2024-09-10 | End: 2024-09-18 | Stop reason: HOSPADM

## 2024-09-10 RX ORDER — LISINOPRIL 20 MG/1
40 TABLET ORAL DAILY
Status: DISCONTINUED | OUTPATIENT
Start: 2024-09-11 | End: 2024-09-11

## 2024-09-10 RX ORDER — GLUCAGON 1 MG
1 KIT INJECTION
Status: DISCONTINUED | OUTPATIENT
Start: 2024-09-10 | End: 2024-09-12

## 2024-09-10 RX ORDER — INSULIN ASPART 100 [IU]/ML
0-5 INJECTION, SOLUTION INTRAVENOUS; SUBCUTANEOUS
Status: DISCONTINUED | OUTPATIENT
Start: 2024-09-10 | End: 2024-09-12

## 2024-09-10 RX ORDER — OXYCODONE HCL 10 MG/1
10 TABLET, FILM COATED, EXTENDED RELEASE ORAL DAILY PRN
Status: DISCONTINUED | OUTPATIENT
Start: 2024-09-10 | End: 2024-09-12

## 2024-09-10 RX ORDER — MICONAZOLE NITRATE 2 G/100G
POWDER TOPICAL 2 TIMES DAILY
Status: DISCONTINUED | OUTPATIENT
Start: 2024-09-10 | End: 2024-09-18 | Stop reason: HOSPADM

## 2024-09-10 RX ORDER — IBUPROFEN 200 MG
24 TABLET ORAL
Status: DISCONTINUED | OUTPATIENT
Start: 2024-09-10 | End: 2024-09-12

## 2024-09-10 RX ORDER — FLUCONAZOLE 100 MG/1
100 TABLET ORAL DAILY
Status: DISCONTINUED | OUTPATIENT
Start: 2024-09-10 | End: 2024-09-12

## 2024-09-10 RX ORDER — ALBUTEROL SULFATE 90 UG/1
2 INHALANT RESPIRATORY (INHALATION) EVERY 6 HOURS PRN
Status: DISCONTINUED | OUTPATIENT
Start: 2024-09-10 | End: 2024-09-18 | Stop reason: HOSPADM

## 2024-09-10 RX ORDER — ACETAMINOPHEN 325 MG/1
325 TABLET ORAL EVERY 6 HOURS PRN
Status: DISCONTINUED | OUTPATIENT
Start: 2024-09-10 | End: 2024-09-18 | Stop reason: HOSPADM

## 2024-09-10 RX ORDER — ONDANSETRON 8 MG/1
8 TABLET, ORALLY DISINTEGRATING ORAL EVERY 8 HOURS PRN
Status: DISCONTINUED | OUTPATIENT
Start: 2024-09-10 | End: 2024-09-18 | Stop reason: HOSPADM

## 2024-09-10 RX ORDER — PANTOPRAZOLE SODIUM 40 MG/1
40 TABLET, DELAYED RELEASE ORAL DAILY
Status: DISCONTINUED | OUTPATIENT
Start: 2024-09-11 | End: 2024-09-18 | Stop reason: HOSPADM

## 2024-09-10 RX ORDER — DULOXETIN HYDROCHLORIDE 60 MG/1
60 CAPSULE, DELAYED RELEASE ORAL DAILY
Status: DISCONTINUED | OUTPATIENT
Start: 2024-09-11 | End: 2024-09-18 | Stop reason: HOSPADM

## 2024-09-10 RX ORDER — ALPRAZOLAM 0.5 MG/1
0.5 TABLET ORAL 2 TIMES DAILY PRN
Status: DISCONTINUED | OUTPATIENT
Start: 2024-09-10 | End: 2024-09-18 | Stop reason: HOSPADM

## 2024-09-10 RX ORDER — TRAZODONE HYDROCHLORIDE 100 MG/1
100 TABLET ORAL NIGHTLY PRN
Status: DISCONTINUED | OUTPATIENT
Start: 2024-09-10 | End: 2024-09-18 | Stop reason: HOSPADM

## 2024-09-10 RX ORDER — METOPROLOL SUCCINATE 100 MG/1
100 TABLET, EXTENDED RELEASE ORAL 2 TIMES DAILY
Status: DISCONTINUED | OUTPATIENT
Start: 2024-09-10 | End: 2024-09-16

## 2024-09-10 RX ORDER — IBUPROFEN 200 MG
16 TABLET ORAL
Status: DISCONTINUED | OUTPATIENT
Start: 2024-09-10 | End: 2024-09-12

## 2024-09-10 RX ORDER — NIFEDIPINE 30 MG/1
30 TABLET, EXTENDED RELEASE ORAL DAILY
Status: DISCONTINUED | OUTPATIENT
Start: 2024-09-11 | End: 2024-09-10

## 2024-09-10 RX ORDER — ACETAMINOPHEN 500 MG
1000 TABLET ORAL NIGHTLY
Status: DISCONTINUED | OUTPATIENT
Start: 2024-09-10 | End: 2024-09-12

## 2024-09-10 RX ADMIN — LACTULOSE 10 G: 20 SOLUTION ORAL at 09:09

## 2024-09-10 RX ADMIN — OXYCODONE HYDROCHLORIDE 10 MG: 10 TABLET, FILM COATED, EXTENDED RELEASE ORAL at 11:09

## 2024-09-10 RX ADMIN — ALPRAZOLAM 0.5 MG: 0.5 TABLET ORAL at 11:09

## 2024-09-10 RX ADMIN — METOPROLOL SUCCINATE 100 MG: 100 TABLET, EXTENDED RELEASE ORAL at 09:09
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
## 2024-09-10 NOTE — ASSESSMENT & PLAN NOTE
She reports her blood pressures to be chronically relatively low. She takes lisinopril, nifedipine, and metoprolol succinate. Hold nifedipine as it does not seem necessary to take if her blood pressures are chronically low on it. Monitor BP.

## 2024-09-10 NOTE — ED NOTES
Patient identifiers for Vicky Alvarado 59 y.o. female checked and correct.  Chief Complaint   Patient presents with    Shortness of Breath     +Liver cirrhosis patient, has paracentesis scheduled for tomorrow. Patient has been experiencing confusion and SOB since yesterday.       Past Medical History:   Diagnosis Date    Anticoagulant long-term use     Arthritis     Atrial fibrillation     cardioversion    Atrial fibrillation with RVR     HAS WATCHMAN IN PLACE NOW    Avascular necrosis     L hand    CHF (congestive heart failure)     Chronic fatigue     Depression     Diabetes mellitus     Encounter for blood transfusion 07/22/2020    Encounter for blood transfusion 03/2022    Fatty liver     GERD (gastroesophageal reflux disease)     Hx of psychiatric care     Hypertension     Iron deficiency anemia 05/07/2022    TIBC 444 with saturated iron 8    Liver disease     Obese     Pre-diabetes 05/07/2022    Psychiatric problem     Sleep apnea     wears cpap    SOB (shortness of breath) on exertion     Weight loss     75lb intentional weight loss     Allergies reported:   Review of patient's allergies indicates:   Allergen Reactions    Flecainide Shortness Of Breath and Swelling    Shellfish containing products Other (See Comments)     Makes gout terrible    Vancomycin analogues Hives, Itching and Rash       Appearance: Pt awake, alert & oriented to person, place & time. Pt in no acute distress at present time. Pt is clean and well groomed with clothes appropriately fastened.   Skin: Skin warm, dry & intact. Color consistent with ethnicity. Mucous membranes moist. No breakdown or brusing noted.   Musculoskeletal: Patient moving all extremities well, no obvious swelling or deformities noted.   Respiratory: Respirations spontaneous, even, and non-labored. Visible chest rise noted. Airway is open and patent. No accessory muscle use noted.   Neurologic: Sensation is intact. Speech is clear and appropriate. Eyes open  spontaneously, behavior appropriate to situation, follows commands, facial expression symmetrical, bilateral hand grasp equal and even, purposeful motor response noted.  Cardiac: All peripheral pulses present. No Bilateral lower extremity edema. Cap refill is <3 seconds.  Abdomen: Abdomen distended   : Pt voids independently, denies dysuria, hematuria, frequency.

## 2024-09-10 NOTE — ED TRIAGE NOTES
Vicky Alvarado, a 59 y.o. female presents to the ED w/ complaint of 3 weeks since last paracentesis     Triage note:  Chief Complaint   Patient presents with    Shortness of Breath     +Liver cirrhosis patient, has paracentesis scheduled for tomorrow. Patient has been experiencing confusion and SOB since yesterday.       Review of patient's allergies indicates:   Allergen Reactions    Flecainide Shortness Of Breath and Swelling    Shellfish containing products Other (See Comments)     Makes gout terrible    Vancomycin analogues Hives, Itching and Rash     Past Medical History:   Diagnosis Date    Anticoagulant long-term use     Arthritis     Atrial fibrillation     cardioversion    Atrial fibrillation with RVR     HAS WATCHMAN IN PLACE NOW    Avascular necrosis     L hand    CHF (congestive heart failure)     Chronic fatigue     Depression     Diabetes mellitus     Encounter for blood transfusion 07/22/2020    Encounter for blood transfusion 03/2022    Fatty liver     GERD (gastroesophageal reflux disease)     Hx of psychiatric care     Hypertension     Iron deficiency anemia 05/07/2022    TIBC 444 with saturated iron 8    Liver disease     Obese     Pre-diabetes 05/07/2022    Psychiatric problem     Sleep apnea     wears cpap    SOB (shortness of breath) on exertion     Weight loss     75lb intentional weight loss
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
## 2024-09-10 NOTE — ED NOTES
I-STAT Chem-8+ Results:   Value Reference Range   Sodium 137 136-145 mmol/L   Potassium  3.6 3.5-5.1 mmol/L   Chloride 106  mmol/L   Ionized Calcium 1.28 1.06-1.42 mmol/L   CO2 (measured) 21 23-29 mmol/L   Glucose 96  mg/dL   BUN 41 6-30 mg/dL   Creatinine 1.2 0.5-1.4 mg/dL   Hematocrit 27 36-54%

## 2024-09-10 NOTE — ASSESSMENT & PLAN NOTE
Due to lactulose induced diarrhea. She asked for an oral alternative. I warned her of the risk of liver injury with fluconazole. Will start fluconazole 100 mg daily. Monitor LFTs while on it. Give topical miconazole in place of home nystatin.

## 2024-09-10 NOTE — ED PROVIDER NOTES
Encounter Date: 9/10/2024       History     Chief Complaint   Patient presents with    Shortness of Breath     +Liver cirrhosis patient, has paracentesis scheduled for tomorrow. Patient has been experiencing confusion and SOB since yesterday.       HPI  Vicky Alvarado is a 59 y.o. F with a PMHx of Afib (watchman), HTN, DM2, CKD3, HFpEF and NAFLD with cirrhosis and portal gastropathy presenting with symptomatic large volume ascites. She was recently admitted and discharged on 8/13 then went to SNF. She has been getting paracentesis ~weekly with 10L drained each time. Her last paracentesis now was 3.5 weeks ago. She presented to hepatology clinic today for evaluation of ascites and was instructed to present to the ED for paracentesis. She has abdominal discomfort and SOB due to ascites. She denies chest pain, palpitations, nausea/vomiting or dysuria. Was started on Lactulose during her recent admission, and since that time has had a worsening yeast inguinal infection that is not improving with Nystatin powder.           Review of patient's allergies indicates:   Allergen Reactions    Flecainide Shortness Of Breath and Swelling    Shellfish containing products Other (See Comments)     Makes gout terrible    Vancomycin analogues Hives, Itching and Rash     Past Medical History:   Diagnosis Date    Anticoagulant long-term use     Arthritis     Atrial fibrillation     cardioversion    Atrial fibrillation with RVR     HAS WATCHMAN IN PLACE NOW    Avascular necrosis     L hand    CHF (congestive heart failure)     Chronic fatigue     Depression     Diabetes mellitus     Encounter for blood transfusion 07/22/2020    Encounter for blood transfusion 03/2022    Fatty liver     GERD (gastroesophageal reflux disease)     Hx of psychiatric care     Hypertension     Iron deficiency anemia 05/07/2022    TIBC 444 with saturated iron 8    Liver disease     Obese     Pre-diabetes 05/07/2022    Psychiatric problem     Sleep  apnea     wears cpap    SOB (shortness of breath) on exertion     Weight loss     75lb intentional weight loss     Past Surgical History:   Procedure Laterality Date    ABLATION OF ARRHYTHMOGENIC FOCUS FOR ATRIAL FIBRILLATION N/A 07/20/2020    Procedure: Ablation atrial fibrillation;  Surgeon: Gabriel Hawley MD;  Location: [facility] EP LAB;  Service: Cardiology;  Laterality: N/A;  afib, PVI, RFA, REENA, SHADY, anes, MB, 3 Prep    ABLATION OF ARRHYTHMOGENIC FOCUS FOR ATRIAL FIBRILLATION N/A 01/24/2022    Procedure: Ablation atrial fibrillation;  Surgeon: Gabriel Hawley MD;  Location: [facility] EP LAB;  Service: Cardiology;  Laterality: N/A;  afib/afl, PVI (re-do)/CTI, RFA, REENA (cx if SR), SHADY, anes, MB, 3 Prep    APPLICATION OF WOUND VACUUM-ASSISTED CLOSURE DEVICE Left 08/03/2020    Procedure: APPLICATION, WOUND VAC;  Surgeon: SEMAJ Márquez III, MD;  Location: [facility] OR 2ND FLR;  Service: Peripheral Vascular;  Laterality: Left;    APPLICATION OF WOUND VACUUM-ASSISTED CLOSURE DEVICE Left 08/06/2020    Procedure: APPLICATION, WOUND VAC;  Surgeon: SEMAJ Márquez III, MD;  Location: [facility] OR 2ND FLR;  Service: Peripheral Vascular;  Laterality: Left;    CARPAL TUNNEL RELEASE Right 06/10/2020    Procedure: RELEASE, CARPAL TUNNEL - RIGHT;  Surgeon: Adelaida Hall MD;  Location: [facility];  Service: Orthopedics;  Laterality: Right;  GENERAL AND REGIONAL    CARPAL TUNNEL RELEASE Left 05/05/2021    Procedure: RELEASE, CARPAL TUNNEL, LEFT;  Surgeon: Adelaida Hall MD;  Location: [facility] OR;  Service: Orthopedics;  Laterality: Left;  GENERAL & REGIONAL IN PACU    CARPECTOMY Left 9/6/2023    Procedure: CARPECTOMY;  Surgeon: Adelaida Hall MD;  Location: [facility] OR;  Service: Orthopedics;  Laterality: Left;  MAC/REGIONAL  LEFT PROXIMAL ROW    CLOSURE OF WOUND Left 08/06/2020    Procedure: CLOSURE, WOUND;  Surgeon: SEMAJ Márquez III, MD;  Location: [facility] OR 2ND FLR;  Service: Peripheral Vascular;  Laterality: Left;   Complex    COLONOSCOPY N/A 08/17/2021    Procedure: COLONOSCOPY Suprep;  Surgeon: Loco Deluca MD;  Location: [facility];  Service: Endoscopy;  Laterality: N/A;    ECHOCARDIOGRAM,TRANSESOPHAGEAL N/A 07/24/2023    Procedure: Transesophageal echo (DEMI) intra-procedure log documentation;  Surgeon: Leyla Diagnostic Provider;  Location: [facility] EP LAB;  Service: Cardiology;  Laterality: N/A;  s/p WM, DEMI, anes, 3 Prep    ESOPHAGOGASTRODUODENOSCOPY N/A 08/17/2021    Procedure: EGD (ESOPHAGOGASTRODUODENOSCOPY);  Surgeon: Loco Deluca MD;  Location: [facility] ENDO;  Service: Endoscopy;  Laterality: N/A;  Patient is schedule to have her Covid test on 08/14/2021 at the [facility] @ 9:30am. [place].    EXPLORATION OF FEMORAL ARTERY Left 07/21/2020    Procedure: EXPLORATION, ARTERY, FEMORAL;  Surgeon: SEMAJ Márquez III, MD;  Location: [address];  Service: Peripheral Vascular;  Laterality: Left;  1. Urgent Direct repair, L SFA branch laceration    FOOT SURGERY      HYSTERECTOMY      HYSTEROSCOPY WITH DILATION AND CURETTAGE OF UTERUS N/A 02/19/2022    Procedure: HYSTEROSCOPY, WITH DILATION AND CURETTAGE OF UTERUS;  Surgeon: Shane Palomo MD;  [address];  Service: OB/GYN;  Laterality: N/A;    INCISION AND DRAINAGE OF KNEE Left 05/12/2022    Procedure: INCISION AND DRAINAGE, Prepatellar bursectomy.;  Surgeon: Dre Guzmán MD;  Location: [address];  Service: Orthopedics;  Laterality: Left;    LEFT HEART CATHETERIZATION Left 02/07/2023    Procedure: Left heart cath;  Surgeon: Josiah Terry MD;  Location: [facility] CATH LAB;  Service: Cardiology;  Laterality: Left;  borderline moderate bleeding risk 6.3%    OCCLUSION OF LEFT ATRIAL APPENDAGE N/A 06/12/2023    Procedure: Left atrial appendage occlusion;  Surgeon: Gabriel Hawley MD;  Location: [facility] EP LAB;  Service: Cardiology;  Laterality: N/A;  afib, watchman, BSCI, demi, anes, MB, 3prep    RELEASE OF ULNAR NERVE AT CUBITAL TUNNEL  Bilateral     RIGHT HEART CATHETERIZATION Right 8/5/2024    Procedure: INSERTION, CATHETER, RIGHT HEART;  Surgeon: Zane Liu MD;  Location: [facility] CATH LAB;  Service: Cardiology;  Laterality: Right;    ROBOT-ASSISTED LAPAROSCOPIC ABDOMINAL HYSTERECTOMY USING DA KEIRY XI N/A 08/09/2022    Procedure: XI ROBOTIC HYSTERECTOMY;  Surgeon: Yolanda Barriga MD;  Location: [facility];  Service: OB/GYN;  Laterality: N/A;  dual console requested    ROBOT-ASSISTED LAPAROSCOPIC SALPINGO-OOPHORECTOMY Bilateral 08/09/2022    Procedure: ROBOTIC SALPINGO-OOPHORECTOMY;  Surgeon: Yolanda Barriga MD;  Location: [facility] OR;  Service: OB/GYN;  Laterality: Bilateral;    TRANSESOPHAGEAL ECHOCARDIOGRAPHY N/A 06/12/2023    Procedure: ECHOCARDIOGRAM, TRANSESOPHAGEAL;  Surgeon: Cyirl Mast MD;  Location: [facility] EP LAB;  Service: Cardiology;  Laterality: N/A;    TREATMENT OF CARDIAC ARRHYTHMIA N/A 01/29/2020    Procedure: CARDIOVERSION;  Surgeon: Gabriel Hawley MD;  Location: [facility] EP LAB;  Service: Cardiology;  Laterality: N/A;  af, demi, dccv, anes, mb, 345    TREATMENT OF CARDIAC ARRHYTHMIA  01/24/2022    Procedure: Cardioversion or Defibrillation;  Surgeon: Gabriel Hawely MD;  Location: [facility] EP LAB;  Service: Cardiology;;    WOUND DEBRIDEMENT Left 08/06/2020    Procedure: DEBRIDEMENT, WOUND;  Surgeon: SEMAJ Márquez III, MD;  Location[address];  Service: Peripheral Vascular;  Laterality: Left;     Family History   Problem Relation Name Age of Onset    Cataracts Mother      Hypertension Mother      Thyroid disease Mother      Diabetes Father      Hypertension Father      Hypertension Sister      Hypertension Brother      Amblyopia Neg Hx      Blindness Neg Hx      Cancer Neg Hx      Glaucoma Neg Hx      Macular degeneration Neg Hx      Retinal detachment Neg Hx      Strabismus Neg Hx      Stroke Neg Hx      Breast cancer Neg Hx      Colon cancer Neg Hx      Ovarian cancer Neg Hx       Social History     Tobacco Use     Smoking status: Former     Current packs/day: 0.00     Types: Cigarettes     Quit date: 2019     Years since quittin.2    Smokeless tobacco: Never   Substance Use Topics    Alcohol use: No    Drug use: No         Physical Exam     Initial Vitals [09/10/24 1353]   BP Pulse Resp Temp SpO2   (!) 91/51 (!) 57 18 96.1 °F (35.6 °C) 99 %      MAP       --         Physical Exam    Constitutional: She appears well-developed and well-nourished.   HENT:   Head: Normocephalic and atraumatic.   Right Ear: External ear normal.   Left Ear: External ear normal.   Nose: Nose normal.   Eyes: Pupils are equal, round, and reactive to light.   Neck:   Normal range of motion.  Cardiovascular:  Normal rate and normal heart sounds.           No murmur heard.  Afib   Pulmonary/Chest: Breath sounds normal. No respiratory distress. She has no wheezes. She has no rales.   Abdominal: She exhibits distension. There is abdominal tenderness. There is no guarding.   Musculoskeletal:         General: Edema present.      Cervical back: Normal range of motion.     Neurological: She is alert and oriented to person, place, and time. No cranial nerve deficit.   Skin: Skin is warm.   Erythematous rash in the inguinal folds bilaterally   Psychiatric: She has a normal mood and affect. Her behavior is normal. Judgment and thought content normal.         ED Course     Labs Reviewed   COMPREHENSIVE METABOLIC PANEL - Abnormal       Result Value    Sodium 137      Potassium 3.5      Chloride 106      CO2 22 (*)     Glucose 96      BUN 44 (*)     Creatinine 1.1      Calcium 10.2      Total Protein 6.7      Albumin 2.4 (*)     Total Bilirubin 0.6      Alkaline Phosphatase 278 (*)     AST 22      ALT 7 (*)     eGFR 57.5 (*)     Anion Gap 9     ISTAT PROCEDURE - Abnormal    POC Glucose 96      POC BUN 41 (*)     POC Creatinine 1.2      POC Sodium 137      POC Potassium 3.6      POC Chloride 106      POC TCO2 (MEASURED) 21 (*)     POC Ionized Calcium 1.28       POC Hematocrit 27 (*)     Sample MAITE     PROTIME-INR    Prothrombin Time 11.1      INR 1.0     ISTAT CHEM8          Imaging Results    None          Medications - No data to display  Medical Decision Making  Vicky Alvarado is a 59 y.o. F with a PMHx of Afib (watchman), HTN, DM2, CKD3, HFpEF and NAFLD with cirrhosis and portal gastropathy presenting with symptomatic large volume ascites.     Differential diagnosis: cirrhosis related simple ascites vs SBP vs pelvic malignancy    This patient has repeated, weekly paracentesis of 10L and has not been tapped in 3.5 weeks, now presented with re accumulation of ascites fluid. This will require a paracentesis with IR or another specialty service. Per chart review, she tends to get hypotensive with paracentesis and with this large of volume will not attempt to drain in the ED.  She will need likely both oral and topical antifungals for severe inguinal yeast infection.     Admit to medicine for paracentesis     Amount and/or Complexity of Data Reviewed  Labs: ordered.    Risk  Decision regarding hospitalization.              Attending Attestation:             Attending ED Notes:   Attending Note:  I have seen the patient, have repeated the key portions of the history and physical, reviewed and agree with the medical documentation, and supervised and managed the medical care of the patient. Additionally, I was present for the critical portion of any procedure(s) performed.    59 F above presenting today for abdominal distention, increased ascites.  Hypotensive in clinic, sent to the ED for emergent therapeutic paracentesis  Turned over to oncoming team pending labs, anticipate admission for paracentesis.     JIGNESH Lauren MD  Staff ED Physician                                   Clinical Impression:  Final diagnoses:  [R18.8] Ascites          ED Disposition Condition    Admit Stable          Puja Howard MD  Anesthesiology/Internal Medicine PGY-1  [facility]          Puja Howard MD  Resident  09/10/24 [id number]       Tita Lauren MD  09/11/24 [id number]

## 2024-09-10 NOTE — PROGRESS NOTES
HEPATOLOGY CONSULTATION    Referring Physician: Dr. Brennon Nunez  Current Corresponding Physician: Dr. Gordy Cordero    Reason for Consultation: Consultation for evaluation of Cirrhosis and Ascites    History of Present Illness: Vicky Alvarado is a 59 y.o. femalewho presents for evaluation of   Chief Complaint   Patient presents with    Cirrhosis    Ascites   59-year-old woman appears to have cirrhosis and portal hypertension secondary to a combination of metabolic dysfunction associated steatotic liver disease and congestive hepatopathy.  She had a recent hospitalization with decompensation in the form of anasarca and ascites.  She does appear to have some degree of renal insufficiency.  She presents to the clinic today short of breath with fairly tense ascites.  A previous upper endoscopy in 2021 showed portal hypertensive gastropathy.  This will need to be repeated.  She has minimal symptoms of hepatic encephalopathy.  She does have lower extremity edema.  She is currently in a wheelchair.    Past Medical History:   Diagnosis Date    Anticoagulant long-term use     Arthritis     Atrial fibrillation     cardioversion    Atrial fibrillation with RVR     HAS WATCHMAN IN PLACE NOW    Avascular necrosis     L hand    CHF (congestive heart failure)     Chronic fatigue     Depression     Diabetes mellitus     Encounter for blood transfusion 07/22/2020    Encounter for blood transfusion 03/2022    Fatty liver     GERD (gastroesophageal reflux disease)     Hx of psychiatric care     Hypertension     Iron deficiency anemia 05/07/2022    TIBC 444 with saturated iron 8    Liver disease     Obese     Pre-diabetes 05/07/2022    Psychiatric problem     Sleep apnea     wears cpap    SOB (shortness of breath) on exertion     Weight loss     75lb intentional weight loss     Outpatient Encounter Medications as of 9/10/2024   Medication Sig Dispense Refill    acetaminophen (TYLENOL) 500 MG tablet Take 2 tablets (1,000 mg  total) by mouth every evening.      albuterol (VENTOLIN HFA) 90 mcg/actuation inhaler Inhale 2 puffs into the lungs every 6 (six) hours as needed for Wheezing. Rescue 18 g 6    ALPRAZolam (XANAX) 0.5 MG tablet Take 1 tablet (0.5 mg total) by mouth 2 (two) times daily as needed for Anxiety. 60 tablet 4    b complex vitamins capsule Take 1 capsule by mouth every other day.      DULoxetine (CYMBALTA) 60 MG capsule Take 1 capsule (60 mg total) by mouth once daily. 180 capsule 3    lactulose (CHRONULAC) 10 gram/15 mL solution Take 15 mLs (10 g total) by mouth 2 (two) times daily. Please ensure you are having at least 2-3 bowel movements every day. 900 mL 2    lisinopriL (PRINIVIL,ZESTRIL) 40 MG tablet Take 1 tablet (40 mg total) by mouth once daily. 90 tablet 3    metoprolol succinate (TOPROL-XL) 100 MG 24 hr tablet Take 1 tablet (100 mg total) by mouth 2 (two) times daily. 180 tablet 3    multivitamin (THERAGRAN) per tablet Take 1 tablet by mouth once daily.      NIFEdipine (PROCARDIA-XL) 30 MG (OSM) 24 hr tablet Take 1 tablet (30 mg total) by mouth once daily. 90 tablet 3    nystatin (MYCOSTATIN) powder Apply topically 2 (two) times daily. 60 g 1    omeprazole (PRILOSEC) 20 MG capsule TAKE 1 CAPSULE BY MOUTH ONCE DAILY 90 capsule 3    ondansetron (ZOFRAN-ODT) 4 MG TbDL Take 2 tablets (8 mg total) by mouth every 8 (eight) hours as needed (nasuea, vomiting). 30 tablet 1    oxyCODONE (OXYCONTIN) 10 mg 12 hr tablet Take 1 tablet (10 mg total) by mouth daily as needed for Pain. 30 tablet 0    traZODone (DESYREL) 100 MG tablet Take 1 tablet (100 mg total) by mouth nightly as needed for Insomnia.      [DISCONTINUED] medroxyPROGESTERone (PROVERA) 10 MG tablet Take 2 tablets (20 mg total) by mouth 3 (three) times daily. 180 tablet 0     No facility-administered encounter medications on file as of 9/10/2024.     Review of patient's allergies indicates:   Allergen Reactions    Flecainide Shortness Of Breath and Swelling     Shellfish containing products Other (See Comments)     Makes gout terrible    Vancomycin analogues Hives, Itching and Rash     Family History   Problem Relation Name Age of Onset    Cataracts Mother      Hypertension Mother      Thyroid disease Mother      Diabetes Father      Hypertension Father      Hypertension Sister      Hypertension Brother      Amblyopia Neg Hx      Blindness Neg Hx      Cancer Neg Hx      Glaucoma Neg Hx      Macular degeneration Neg Hx      Retinal detachment Neg Hx      Strabismus Neg Hx      Stroke Neg Hx      Breast cancer Neg Hx      Colon cancer Neg Hx      Ovarian cancer Neg Hx         Social History     Socioeconomic History    Marital status:    Tobacco Use    Smoking status: Former     Current packs/day: 0.00     Types: Cigarettes     Quit date: 2019     Years since quittin.2    Smokeless tobacco: Never   Substance and Sexual Activity    Alcohol use: No    Drug use: No    Sexual activity: Not Currently     Partners: Male     Birth control/protection: See Surgical Hx     Social Determinants of Health     Financial Resource Strain: Low Risk  (2024)    Overall Financial Resource Strain (CARDIA)     Difficulty of Paying Living Expenses: Not hard at all   Food Insecurity: No Food Insecurity (2024)    Hunger Vital Sign     Worried About Running Out of Food in the Last Year: Never true     Ran Out of Food in the Last Year: Never true   Transportation Needs: No Transportation Needs (2024)    TRANSPORTATION NEEDS     Transportation : No   Physical Activity: Inactive (2024)    Exercise Vital Sign     Days of Exercise per Week: 0 days     Minutes of Exercise per Session: 0 min   Stress: No Stress Concern Present (2024)    [employer] - Occupational Stress Questionnaire     Feeling of Stress : Not at all   Housing Stability: Low Risk  (2024)    Housing Stability Vital Sign     Unable to Pay for Housing in the Last Year:  No     Homeless in the Last Year: No     Review of Systems   Constitutional:  Positive for fatigue. Negative for appetite change and unexpected weight change.   HENT:  Negative for ear pain, hearing loss, sore throat and trouble swallowing.    Eyes:  Negative for visual disturbance.   Respiratory:  Positive for shortness of breath. Negative for cough.    Cardiovascular:  Negative for chest pain and palpitations.   Gastrointestinal:  Positive for abdominal distention. Negative for abdominal pain, nausea and vomiting.   Genitourinary:  Negative for difficulty urinating and dysuria.   Musculoskeletal:  Negative for arthralgias and back pain.   Skin:  Negative for rash.   Neurological:  Negative for tremors, seizures and headaches.   Psychiatric/Behavioral:  Negative for agitation and decreased concentration.      Vitals:    09/10/24 1313   BP: (!) 110/55   Pulse: 76   Resp: 18   Temp: 97 °F (36.1 °C)       Physical Exam  Constitutional:       Appearance: She is cachectic. She is not diaphoretic.   HENT:      Right Ear: External ear normal.      Left Ear: External ear normal.   Eyes:      General: No scleral icterus.  Cardiovascular:      Rate and Rhythm: Normal rate and regular rhythm.      Heart sounds: Normal heart sounds. No murmur heard.     No friction rub. No gallop.   Pulmonary:      Effort: Pulmonary effort is normal. No respiratory distress.      Breath sounds: Normal breath sounds. No wheezing.   Abdominal:      General: Bowel sounds are normal. There is no distension.      Palpations: Abdomen is soft. There is shifting dullness and fluid wave. There is no mass.      Tenderness: There is no abdominal tenderness.   Musculoskeletal:         General: Normal range of motion.      Right lower leg: Edema present.      Left lower leg: Edema present.   Lymphadenopathy:      Cervical: No cervical adenopathy.   Skin:     General: Skin is warm and dry.      Coloration: Skin is not pale.   Neurological:      Mental  Status: She is alert and oriented to person, place, and time.         MELD 3.0: 12 at 7/29/2024  2:27 PM  MELD-Na: 9 at 7/29/2024  2:27 PM  Calculated from:  Serum Creatinine: 1.2 mg/dL at 7/29/2024  2:27 PM  Serum Sodium: 141 mmol/L (Using max of 137 mmol/L) at 7/29/2024  2:27 PM  Total Bilirubin: 0.6 mg/dL (Using min of 1 mg/dL) at 7/28/2024  5:10 AM  Serum Albumin: 2.4 g/dL at 7/29/2024  2:27 PM  INR(ratio): 1.1 at 7/27/2024  4:35 AM  Age at listing (hypothetical): 59 years  Sex: Female at 7/29/2024  2:27 PM      Lab Results   Component Value Date    GLU 94 08/13/2024    BUN 31 (H) 08/13/2024    CREATININE 1.5 (H) 08/13/2024    CALCIUM 9.5 08/13/2024     (L) 08/13/2024    K 4.6 08/13/2024    CL 92 (L) 08/13/2024    PROT 6.4 08/13/2024    CO2 27 08/13/2024    ANIONGAP 12 08/13/2024    WBC 9.14 08/13/2024    RBC 3.94 (L) 08/13/2024    HGB 10.8 (L) 08/13/2024    HCT 36.0 (L) 08/13/2024    HCT 34 (L) 07/26/2024    MCV 91 08/13/2024    MCH 27.4 08/13/2024    MCHC 30.0 (L) 08/13/2024     Lab Results   Component Value Date    RDW 14.8 (H) 08/13/2024     08/13/2024    MPV 10.3 08/13/2024    GRAN 6.5 08/13/2024    GRAN 71.4 08/13/2024    LYMPH 1.0 08/13/2024    LYMPH 10.6 (L) 08/13/2024    MONO 1.1 (H) 08/13/2024    MONO 12.3 08/13/2024    EOSINOPHIL 3.9 08/13/2024    BASOPHIL 0.9 08/13/2024    EOS 0.4 08/13/2024    BASO 0.08 08/13/2024    APTT 29.2 06/09/2023    GROUPTRH O NEG 06/12/2023     (H) 07/26/2024    CHOL 110 (L) 12/20/2023    TRIG 144 12/20/2023    HDL 31 (L) 12/20/2023    CHOLHDL 28.2 12/20/2023    TOTALCHOLEST 3.5 12/20/2023    ALBUMIN 2.3 (L) 08/13/2024    BILIDIR 0.8 (H) 08/02/2024    AST 27 08/13/2024    ALT 5 (L) 08/13/2024    ALKPHOS 194 (H) 08/13/2024    MG 2.1 08/13/2024    LABPROT 11.6 07/27/2024    INR 1.1 07/27/2024       Assessment and Plan:  Patient Active Problem List   Diagnosis    Opiate dependence    Opioid dependence in remission    Essential hypertension    Atrial  Fibrillation (paroxysmal)    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    History of opioid abuse    Type 2 diabetes mellitus with diabetic polyneuropathy, without long-term current use of insulin    Right carpal tunnel syndrome    Weakness of right hand    ERENDIRA (obstructive sleep apnea)    Depression    Carpal tunnel syndrome    Ulnar neuropathy at elbow of left upper extremity    Range of motion deficit    Edema of hand    Pure hypercholesterolemia    Mild aortic stenosis    Microcytic anemia    Colon cancer screening    Vaginal bleeding    Gastroesophageal reflux disease without esophagitis    Recurrent major depressive disorder, in partial remission    Thrombocytopenia    Acute post-hemorrhagic anemia    Pre-diabetes    Iron deficiency anemia    Cellulitis of extremity    Left leg cellulitis    Volume overload    s/p RA-TLH/BSO    Laryngeal edema    Longstanding persistent atrial fibrillation    Abnormal nuclear stress test    Elevated liver enzymes    Metabolic dysfunction-associated steatotic liver disease (MASLD)    Primary osteoarthritis of both knees    Left wrist pain    Stiffness of left wrist joint    Swelling of left wrist    Portal hypertension    Stage 3b chronic kidney disease    Coronary artery disease of native artery of native heart with stable angina pectoris    Acute on chronic diastolic heart failure    Anasarca    Irritant contact dermatitis due to fecal, urinary or dual incontinence    Hyponatremia    Anxiety    Pain syndrome, chronic    Other cirrhosis of liver    Other ascites     Vicky Alvarado is a 59 y.o. female withCirrhosis and Ascites    Impression:    Decompensated cirrhosis secondary to metabolic dysfunction associated steatotic liver disease and congestive hepatopathy complicated by troublesome ascites.    Plan:    As she states that she is short of breath and uncomfortable she has been directed to the emergency room for evaluation and possible readmission.  I  will be scheduling weekly paracenteses.  I will enroll her in a hepatoma surveillance program.  We will arrange a repeat upper endoscopy.  I will arrange short term follow-up.

## 2024-09-10 NOTE — ASSESSMENT & PLAN NOTE
Patient with known Cirrhosis with Child's class Calculator for Child's score link- [email or web address]-tilley-score-cirrhosis-mortality#creator-insights. Co-morbidities are present and inclusive of ascites, portal hypertension, and hepatic encephalopathy.  MELD-Na score calculated; MELD 3.0: 10 at 9/10/2024  3:31 PM  MELD-Na: 7 at 9/10/2024  3:31 PM  Calculated from:  Serum Creatinine: 1.1 mg/dL at 9/10/2024  3:31 PM  Serum Sodium: 137 mmol/L at 9/10/2024  3:31 PM  Total Bilirubin: 0.6 mg/dL (Using min of 1 mg/dL) at 9/10/2024  3:31 PM  Serum Albumin: 2.4 g/dL at 9/10/2024  3:31 PM  INR(ratio): 1.0 at 9/10/2024  3:31 PM  Age at listing (hypothetical): 59 years  Sex: Female at 9/10/2024  3:31 PM      Continue chronic meds. Etiology likely NAFLD. Will avoid any hepatotoxic meds, and monitor CBC/CMP/INR for synthetic function.   Continue home lactulose.

## 2024-09-11 LAB
ALBUMIN SERPL BCP-MCNC: 2.1 G/DL (ref 3.5–5.2)
ALP SERPL-CCNC: 219 U/L (ref 55–135)
ALT SERPL W/O P-5'-P-CCNC: 7 U/L (ref 10–44)
ANION GAP SERPL CALC-SCNC: 10 MMOL/L (ref 8–16)
AST SERPL-CCNC: 19 U/L (ref 10–40)
BILIRUB SERPL-MCNC: 0.5 MG/DL (ref 0.1–1)
BUN SERPL-MCNC: 37 MG/DL (ref 6–20)
CALCIUM SERPL-MCNC: 9.2 MG/DL (ref 8.7–10.5)
CHLORIDE SERPL-SCNC: 111 MMOL/L (ref 95–110)
CO2 SERPL-SCNC: 18 MMOL/L (ref 23–29)
CREAT SERPL-MCNC: 1 MG/DL (ref 0.5–1.4)
ERYTHROCYTE [DISTWIDTH] IN BLOOD BY AUTOMATED COUNT: 16.7 % (ref 11.5–14.5)
EST. GFR  (NO RACE VARIABLE): >60 ML/MIN/1.73 M^2
FERRITIN SERPL-MCNC: 207 NG/ML (ref 20–300)
GLUCOSE SERPL-MCNC: 75 MG/DL (ref 70–110)
HCT VFR BLD AUTO: 22.9 % (ref 37–48.5)
HGB BLD-MCNC: 7.5 G/DL (ref 12–16)
IRON SERPL-MCNC: 22 UG/DL (ref 30–160)
MCH RBC QN AUTO: 28.5 PG (ref 27–31)
MCHC RBC AUTO-ENTMCNC: 32.8 G/DL (ref 32–36)
MCV RBC AUTO: 87 FL (ref 82–98)
PLATELET # BLD AUTO: 207 K/UL (ref 150–450)
PMV BLD AUTO: 10.8 FL (ref 9.2–12.9)
POCT GLUCOSE: 101 MG/DL (ref 70–110)
POCT GLUCOSE: 83 MG/DL (ref 70–110)
POCT GLUCOSE: 89 MG/DL (ref 70–110)
POCT GLUCOSE: 97 MG/DL (ref 70–110)
POCT GLUCOSE: 98 MG/DL (ref 70–110)
POTASSIUM SERPL-SCNC: 3.9 MMOL/L (ref 3.5–5.1)
PROT SERPL-MCNC: 5.6 G/DL (ref 6–8.4)
RBC # BLD AUTO: 2.63 M/UL (ref 4–5.4)
SATURATED IRON: 10 % (ref 20–50)
SODIUM SERPL-SCNC: 139 MMOL/L (ref 136–145)
TOTAL IRON BINDING CAPACITY: 225 UG/DL (ref 250–450)
TRANSFERRIN SERPL-MCNC: 152 MG/DL (ref 200–375)
WBC # BLD AUTO: 6.54 K/UL (ref 3.9–12.7)

## 2024-09-11 PROCEDURE — 63700000 PHARM REV CODE 250 ALT 637 W/O HCPCS: Performed by: HOSPITALIST

## 2024-09-11 PROCEDURE — 27000190 HC CPAP FULL FACE MASK W/VALVE

## 2024-09-11 PROCEDURE — 94660 CPAP INITIATION&MGMT: CPT

## 2024-09-11 PROCEDURE — 85027 COMPLETE CBC AUTOMATED: CPT | Performed by: HOSPITALIST

## 2024-09-11 PROCEDURE — 36415 COLL VENOUS BLD VENIPUNCTURE: CPT | Performed by: HOSPITALIST

## 2024-09-11 PROCEDURE — 99900035 HC TECH TIME PER 15 MIN (STAT)

## 2024-09-11 PROCEDURE — 83540 ASSAY OF IRON: CPT | Performed by: HOSPITALIST

## 2024-09-11 PROCEDURE — 25000003 PHARM REV CODE 250: Performed by: HOSPITALIST

## 2024-09-11 PROCEDURE — 94761 N-INVAS EAR/PLS OXIMETRY MLT: CPT

## 2024-09-11 PROCEDURE — 20600001 HC STEP DOWN PRIVATE ROOM

## 2024-09-11 PROCEDURE — 80053 COMPREHEN METABOLIC PANEL: CPT | Performed by: HOSPITALIST

## 2024-09-11 PROCEDURE — 82728 ASSAY OF FERRITIN: CPT | Performed by: HOSPITALIST

## 2024-09-11 RX ORDER — SPIRONOLACTONE 25 MG/1
25 TABLET ORAL DAILY
Status: DISCONTINUED | OUTPATIENT
Start: 2024-09-11 | End: 2024-09-15

## 2024-09-11 RX ORDER — MIDODRINE HYDROCHLORIDE 5 MG/1
10 TABLET ORAL 3 TIMES DAILY
Status: DISCONTINUED | OUTPATIENT
Start: 2024-09-11 | End: 2024-09-16

## 2024-09-11 RX ORDER — FUROSEMIDE 20 MG/1
20 TABLET ORAL DAILY
Status: DISCONTINUED | OUTPATIENT
Start: 2024-09-11 | End: 2024-09-15

## 2024-09-11 RX ADMIN — DULOXETINE HYDROCHLORIDE 60 MG: 60 CAPSULE, DELAYED RELEASE ORAL at 08:09

## 2024-09-11 RX ADMIN — MIDODRINE HYDROCHLORIDE 10 MG: 5 TABLET ORAL at 09:09

## 2024-09-11 RX ADMIN — LISINOPRIL 40 MG: 20 TABLET ORAL at 08:09

## 2024-09-11 RX ADMIN — METOPROLOL SUCCINATE 100 MG: 100 TABLET, EXTENDED RELEASE ORAL at 08:09

## 2024-09-11 RX ADMIN — FLUCONAZOLE 100 MG: 100 TABLET ORAL at 08:09

## 2024-09-11 RX ADMIN — MIDODRINE HYDROCHLORIDE 10 MG: 5 TABLET ORAL at 03:09

## 2024-09-11 RX ADMIN — FERROUS SULFATE TAB EC 325 MG (65 MG FE EQUIVALENT) 1 EACH: 325 (65 FE) TABLET DELAYED RESPONSE at 08:09

## 2024-09-11 RX ADMIN — LACTULOSE 10 G: 20 SOLUTION ORAL at 09:09

## 2024-09-11 RX ADMIN — OXYCODONE HYDROCHLORIDE 10 MG: 10 TABLET, FILM COATED, EXTENDED RELEASE ORAL at 10:09

## 2024-09-11 RX ADMIN — ACETAMINOPHEN 1000 MG: 500 TABLET ORAL at 09:09

## 2024-09-11 RX ADMIN — MICONAZOLE NITRATE 2 % TOPICAL POWDER: at 09:09

## 2024-09-11 RX ADMIN — MICONAZOLE NITRATE 2 % TOPICAL POWDER: at 08:09

## 2024-09-11 RX ADMIN — PANTOPRAZOLE SODIUM 40 MG: 40 TABLET, DELAYED RELEASE ORAL at 08:09

## 2024-09-11 RX ADMIN — LACTULOSE 10 G: 20 SOLUTION ORAL at 08:09

## 2024-09-11 NOTE — ASSESSMENT & PLAN NOTE
Patient with known Cirrhosis with Child's class Calculator for Child's score link- [email or web address]/child-tilley-score-cirrhosis-mortality#creator-insights. Co-morbidities are present and inclusive of ascites, portal hypertension, and hepatic encephalopathy.  MELD-Na score calculated; MELD 3.0: 10 at 9/11/2024  4:58 AM  MELD-Na: 6 at 9/11/2024  4:58 AM  Calculated from:  Serum Creatinine: 1.0 mg/dL at 9/11/2024  4:58 AM  Serum Sodium: 139 mmol/L (Using max of 137 mmol/L) at 9/11/2024  4:58 AM  Total Bilirubin: 0.5 mg/dL (Using min of 1 mg/dL) at 9/11/2024  4:58 AM  Serum Albumin: 2.1 g/dL at 9/11/2024  4:58 AM  INR(ratio): 1.0 at 9/10/2024  3:31 PM  Age at listing (hypothetical): 59 years  Sex: Female at 9/11/2024  4:58 AM      Continue chronic meds. Etiology likely NAFLD. Will avoid any hepatotoxic meds, and monitor CBC/CMP/INR for synthetic function.   Continue home lactulose. Start furosemide and spironolactone at lowest doses. Titrate doses up. Paracentesis when IR can perform it. Her hepatologist will set her up for routine outpatient paracentesis.

## 2024-09-11 NOTE — ASSESSMENT & PLAN NOTE
Lower than previous. Not prescribed iron supplement. Started ferrous sulfate. Recheck iron levels.

## 2024-09-11 NOTE — ASSESSMENT & PLAN NOTE
Due to lactulose induced diarrhea. Started fluconazole 100 mg daily. Monitor LFTs while on it. Give topical miconazole in place of home nystatin. Consult Wound Care.

## 2024-09-11 NOTE — ASSESSMENT & PLAN NOTE
She reports her blood pressures to be chronically relatively low. She takes lisinopril, nifedipine, and metoprolol succinate. Stopped nifedipine. Hold lisinopril and either decrease the dose or discontinue it at discharge. Monitor BP.

## 2024-09-11 NOTE — SUBJECTIVE & OBJECTIVE
Interval History: Discussed restarting diuretics and weaning hypertension medications.    Review of Systems   Constitutional:  Negative for chills and fever.   Gastrointestinal:  Negative for nausea and vomiting.   Neurological:  Negative for seizures and syncope.     Objective:     Vital Signs (Most Recent):  Temp: 98.1 °F (36.7 °C) (09/11/24 0200)  Pulse: (!) 114 (09/11/24 0750)  Resp: 18 (09/11/24 0750)  BP: 95/63 (09/11/24 0750)  SpO2: 95 % (09/11/24 0750) Vital Signs (24h Range):  Temp:  [96.1 °F (35.6 °C)-98.6 °F (37 °C)] 98.1 °F (36.7 °C)  Pulse:  [] 114  Resp:  [14-21] 18  SpO2:  [93 %-99 %] 95 %  BP: ()/(51-80) 95/63     Weight: 81.6 kg (180 lb)  Body mass index is 31.89 kg/m².  No intake or output data in the 24 hours ending 09/11/24 0859      Physical Exam  Vitals and nursing note reviewed.   Constitutional:       General: She is not in acute distress.     Appearance: She is well-developed. She is obese. She is not diaphoretic.      Interventions: She is not intubated.  Cardiovascular:      Rate and Rhythm: Tachycardia present.   Pulmonary:      Effort: Pulmonary effort is normal. No accessory muscle usage or respiratory distress. She is not intubated.   Neurological:      Mental Status: She is alert and oriented to person, place, and time. Mental status is at baseline.      Motor: No seizure activity.   Psychiatric:         Attention and Perception: Attention normal.         Mood and Affect: Mood and affect normal.         Behavior: Behavior is cooperative.             Significant Labs: All pertinent labs within the past 24 hours have been reviewed.    Significant Imaging: I have reviewed all pertinent imaging results/findings within the past 24 hours.
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
## 2024-09-11 NOTE — NURSING
Nurses Note -- 4 Eyes      9/11/2024   1:23 AM      Skin assessed during: Admit      [] No Altered Skin Integrity Present    []Prevention Measures Documented      [x] Yes- Altered Skin Integrity Present or Discovered   [x] LDA Added if Not in Epic (Describe Wound)   [x] New Altered Skin Integrity was Present on Admit and Documented in LDA   [] Wound Image Taken    Wound Care Consulted? Yes    Attending Nurse:  Linda Miller RN/Staff Member: Yocasta

## 2024-09-12 LAB
ALBUMIN FLD-MCNC: 1.3 G/DL
ALBUMIN SERPL BCP-MCNC: 2 G/DL (ref 3.5–5.2)
ALP SERPL-CCNC: 227 U/L (ref 55–135)
ALT SERPL W/O P-5'-P-CCNC: 5 U/L (ref 10–44)
ANION GAP SERPL CALC-SCNC: 7 MMOL/L (ref 8–16)
ANISOCYTOSIS BLD QL SMEAR: SLIGHT
APPEARANCE FLD: CLEAR
AST SERPL-CCNC: 20 U/L (ref 10–40)
BASOPHILS # BLD AUTO: 0.09 K/UL (ref 0–0.2)
BASOPHILS NFR BLD: 1.1 % (ref 0–1.9)
BILIRUB SERPL-MCNC: 0.4 MG/DL (ref 0.1–1)
BODY FLD TYPE: NORMAL
BODY FLUID SOURCE, LDH: NORMAL
BUN SERPL-MCNC: 34 MG/DL (ref 6–20)
CALCIUM SERPL-MCNC: 8.8 MG/DL (ref 8.7–10.5)
CHLORIDE SERPL-SCNC: 108 MMOL/L (ref 95–110)
CO2 SERPL-SCNC: 20 MMOL/L (ref 23–29)
COLOR FLD: YELLOW
CREAT SERPL-MCNC: 1 MG/DL (ref 0.5–1.4)
DIFFERENTIAL METHOD BLD: ABNORMAL
EOSINOPHIL # BLD AUTO: 0.3 K/UL (ref 0–0.5)
EOSINOPHIL NFR BLD: 3.5 % (ref 0–8)
ERYTHROCYTE [DISTWIDTH] IN BLOOD BY AUTOMATED COUNT: 16.8 % (ref 11.5–14.5)
EST. GFR  (NO RACE VARIABLE): >60 ML/MIN/1.73 M^2
GIANT PLATELETS BLD QL SMEAR: PRESENT
GLUCOSE SERPL-MCNC: 87 MG/DL (ref 70–110)
GRAM STN SPEC: NORMAL
GRAM STN SPEC: NORMAL
HCT VFR BLD AUTO: 25.1 % (ref 37–48.5)
HGB BLD-MCNC: 8.5 G/DL (ref 12–16)
HYPOCHROMIA BLD QL SMEAR: ABNORMAL
IMM GRANULOCYTES # BLD AUTO: 0.1 K/UL (ref 0–0.04)
IMM GRANULOCYTES NFR BLD AUTO: 1.2 % (ref 0–0.5)
LDH FLD L TO P-CCNC: 99 U/L
LYMPHOCYTES # BLD AUTO: 1.3 K/UL (ref 1–4.8)
LYMPHOCYTES NFR BLD: 15.3 % (ref 18–48)
LYMPHOCYTES NFR FLD MANUAL: 74 %
MCH RBC QN AUTO: 29.4 PG (ref 27–31)
MCHC RBC AUTO-ENTMCNC: 33.9 G/DL (ref 32–36)
MCV RBC AUTO: 87 FL (ref 82–98)
MESOTHL CELL NFR FLD MANUAL: 3 %
MONOCYTES # BLD AUTO: 0.9 K/UL (ref 0.3–1)
MONOCYTES NFR BLD: 10.3 % (ref 4–15)
MONOS+MACROS NFR FLD MANUAL: 21 %
NEUTROPHILS # BLD AUTO: 5.7 K/UL (ref 1.8–7.7)
NEUTROPHILS NFR BLD: 68.6 % (ref 38–73)
NEUTROPHILS NFR FLD MANUAL: 2 %
NRBC BLD-RTO: 0 /100 WBC
PLATELET # BLD AUTO: 260 K/UL (ref 150–450)
PLATELET BLD QL SMEAR: ABNORMAL
PMV BLD AUTO: 11.1 FL (ref 9.2–12.9)
POCT GLUCOSE: 89 MG/DL (ref 70–110)
POCT GLUCOSE: 95 MG/DL (ref 70–110)
POLYCHROMASIA BLD QL SMEAR: ABNORMAL
POTASSIUM SERPL-SCNC: 3.7 MMOL/L (ref 3.5–5.1)
PROT FLD-MCNC: 2.5 G/DL
PROT SERPL-MCNC: 5.4 G/DL (ref 6–8.4)
RBC # BLD AUTO: 2.89 M/UL (ref 4–5.4)
SODIUM SERPL-SCNC: 135 MMOL/L (ref 136–145)
SPECIMEN SOURCE: NORMAL
SPECIMEN SOURCE: NORMAL
WBC # BLD AUTO: 8.31 K/UL (ref 3.9–12.7)
WBC # FLD: 148 /CU MM

## 2024-09-12 PROCEDURE — 82042 OTHER SOURCE ALBUMIN QUAN EA: CPT | Performed by: STUDENT IN AN ORGANIZED HEALTH CARE EDUCATION/TRAINING PROGRAM

## 2024-09-12 PROCEDURE — 36415 COLL VENOUS BLD VENIPUNCTURE: CPT | Performed by: HOSPITALIST

## 2024-09-12 PROCEDURE — 25000242 PHARM REV CODE 250 ALT 637 W/ HCPCS: Performed by: HOSPITALIST

## 2024-09-12 PROCEDURE — 63600175 PHARM REV CODE 636 W HCPCS: Mod: JZ,JG | Performed by: STUDENT IN AN ORGANIZED HEALTH CARE EDUCATION/TRAINING PROGRAM

## 2024-09-12 PROCEDURE — 20600001 HC STEP DOWN PRIVATE ROOM

## 2024-09-12 PROCEDURE — 84157 ASSAY OF PROTEIN OTHER: CPT | Performed by: STUDENT IN AN ORGANIZED HEALTH CARE EDUCATION/TRAINING PROGRAM

## 2024-09-12 PROCEDURE — 25000003 PHARM REV CODE 250: Performed by: STUDENT IN AN ORGANIZED HEALTH CARE EDUCATION/TRAINING PROGRAM

## 2024-09-12 PROCEDURE — 85025 COMPLETE CBC W/AUTO DIFF WBC: CPT | Performed by: STUDENT IN AN ORGANIZED HEALTH CARE EDUCATION/TRAINING PROGRAM

## 2024-09-12 PROCEDURE — 80053 COMPREHEN METABOLIC PANEL: CPT | Performed by: HOSPITALIST

## 2024-09-12 PROCEDURE — 63700000 PHARM REV CODE 250 ALT 637 W/O HCPCS: Performed by: HOSPITALIST

## 2024-09-12 PROCEDURE — P9047 ALBUMIN (HUMAN), 25%, 50ML: HCPCS | Mod: JZ,JG | Performed by: STUDENT IN AN ORGANIZED HEALTH CARE EDUCATION/TRAINING PROGRAM

## 2024-09-12 PROCEDURE — 94761 N-INVAS EAR/PLS OXIMETRY MLT: CPT

## 2024-09-12 PROCEDURE — 94660 CPAP INITIATION&MGMT: CPT

## 2024-09-12 PROCEDURE — 0W9G3ZZ DRAINAGE OF PERITONEAL CAVITY, PERCUTANEOUS APPROACH: ICD-10-PCS | Performed by: STUDENT IN AN ORGANIZED HEALTH CARE EDUCATION/TRAINING PROGRAM

## 2024-09-12 PROCEDURE — 89051 BODY FLUID CELL COUNT: CPT | Performed by: STUDENT IN AN ORGANIZED HEALTH CARE EDUCATION/TRAINING PROGRAM

## 2024-09-12 PROCEDURE — 99900035 HC TECH TIME PER 15 MIN (STAT)

## 2024-09-12 PROCEDURE — 87205 SMEAR GRAM STAIN: CPT | Performed by: STUDENT IN AN ORGANIZED HEALTH CARE EDUCATION/TRAINING PROGRAM

## 2024-09-12 PROCEDURE — 25000003 PHARM REV CODE 250: Performed by: HOSPITALIST

## 2024-09-12 PROCEDURE — 36415 COLL VENOUS BLD VENIPUNCTURE: CPT | Performed by: STUDENT IN AN ORGANIZED HEALTH CARE EDUCATION/TRAINING PROGRAM

## 2024-09-12 PROCEDURE — 25000003 PHARM REV CODE 250

## 2024-09-12 PROCEDURE — 87075 CULTR BACTERIA EXCEPT BLOOD: CPT | Performed by: STUDENT IN AN ORGANIZED HEALTH CARE EDUCATION/TRAINING PROGRAM

## 2024-09-12 PROCEDURE — 94799 UNLISTED PULMONARY SVC/PX: CPT

## 2024-09-12 PROCEDURE — 87070 CULTURE OTHR SPECIMN AEROBIC: CPT | Performed by: STUDENT IN AN ORGANIZED HEALTH CARE EDUCATION/TRAINING PROGRAM

## 2024-09-12 PROCEDURE — 83615 LACTATE (LD) (LDH) ENZYME: CPT | Performed by: STUDENT IN AN ORGANIZED HEALTH CARE EDUCATION/TRAINING PROGRAM

## 2024-09-12 RX ORDER — OXYCODONE HYDROCHLORIDE 10 MG/1
10 TABLET ORAL EVERY 12 HOURS PRN
Status: DISCONTINUED | OUTPATIENT
Start: 2024-09-12 | End: 2024-09-12

## 2024-09-12 RX ORDER — NIFEDIPINE 30 MG/1
30 TABLET, EXTENDED RELEASE ORAL DAILY
Status: ON HOLD | COMMUNITY
Start: 2024-09-12 | End: 2024-09-18 | Stop reason: HOSPADM

## 2024-09-12 RX ORDER — ALBUMIN HUMAN 250 G/1000ML
50 SOLUTION INTRAVENOUS ONCE
Status: COMPLETED | OUTPATIENT
Start: 2024-09-12 | End: 2024-09-12

## 2024-09-12 RX ORDER — OXYCODONE HYDROCHLORIDE 5 MG/1
5 TABLET ORAL EVERY 8 HOURS PRN
Status: DISCONTINUED | OUTPATIENT
Start: 2024-09-12 | End: 2024-09-18 | Stop reason: HOSPADM

## 2024-09-12 RX ORDER — LIDOCAINE HYDROCHLORIDE 10 MG/ML
INJECTION, SOLUTION INFILTRATION; PERINEURAL
Status: COMPLETED | OUTPATIENT
Start: 2024-09-12 | End: 2024-09-12

## 2024-09-12 RX ORDER — METHOCARBAMOL 500 MG/1
500 TABLET, FILM COATED ORAL 4 TIMES DAILY PRN
Status: DISCONTINUED | OUTPATIENT
Start: 2024-09-12 | End: 2024-09-18 | Stop reason: HOSPADM

## 2024-09-12 RX ADMIN — MICONAZOLE NITRATE: 20 OINTMENT TOPICAL at 10:09

## 2024-09-12 RX ADMIN — MIDODRINE HYDROCHLORIDE 10 MG: 5 TABLET ORAL at 08:09

## 2024-09-12 RX ADMIN — FLUCONAZOLE 100 MG: 100 TABLET ORAL at 08:09

## 2024-09-12 RX ADMIN — PANTOPRAZOLE SODIUM 40 MG: 40 TABLET, DELAYED RELEASE ORAL at 08:09

## 2024-09-12 RX ADMIN — FUROSEMIDE 20 MG: 20 TABLET ORAL at 08:09

## 2024-09-12 RX ADMIN — ACETAMINOPHEN 325 MG: 325 TABLET ORAL at 03:09

## 2024-09-12 RX ADMIN — MICONAZOLE NITRATE 2 % TOPICAL POWDER: at 08:09

## 2024-09-12 RX ADMIN — METOPROLOL SUCCINATE 100 MG: 100 TABLET, EXTENDED RELEASE ORAL at 08:09

## 2024-09-12 RX ADMIN — ALBUTEROL SULFATE 2 PUFF: 108 INHALANT RESPIRATORY (INHALATION) at 06:09

## 2024-09-12 RX ADMIN — SPIRONOLACTONE 25 MG: 25 TABLET ORAL at 08:09

## 2024-09-12 RX ADMIN — ALPRAZOLAM 0.5 MG: 0.5 TABLET ORAL at 08:09

## 2024-09-12 RX ADMIN — METHOCARBAMOL 500 MG: 500 TABLET ORAL at 06:09

## 2024-09-12 RX ADMIN — MIDODRINE HYDROCHLORIDE 10 MG: 5 TABLET ORAL at 03:09

## 2024-09-12 RX ADMIN — ALPRAZOLAM 0.5 MG: 0.5 TABLET ORAL at 06:09

## 2024-09-12 RX ADMIN — ALBUMIN (HUMAN) 50 G: 12.5 SOLUTION INTRAVENOUS at 10:09

## 2024-09-12 RX ADMIN — FERROUS SULFATE TAB EC 325 MG (65 MG FE EQUIVALENT) 1 EACH: 325 (65 FE) TABLET DELAYED RESPONSE at 08:09

## 2024-09-12 RX ADMIN — DULOXETINE HYDROCHLORIDE 60 MG: 60 CAPSULE, DELAYED RELEASE ORAL at 08:09

## 2024-09-12 RX ADMIN — LACTULOSE 10 G: 20 SOLUTION ORAL at 08:09

## 2024-09-12 RX ADMIN — OXYCODONE 5 MG: 5 TABLET ORAL at 08:09

## 2024-09-12 RX ADMIN — LIDOCAINE HYDROCHLORIDE 5 ML: 10 INJECTION, SOLUTION INFILTRATION; PERINEURAL at 09:09

## 2024-09-12 NOTE — H&P
Inpatient Radiology Pre-procedure Note    History of Present Illness:  Vicky Alvarado is a 59 y.o. female who presents for US guided paracentesis.    Admission H&P reviewed.  Past Medical History:   Diagnosis Date    Anticoagulant long-term use     Arthritis     Atrial fibrillation     cardioversion    Atrial fibrillation with RVR     HAS WATCHMAN IN PLACE NOW    Avascular necrosis     L hand    Cellulitis of extremity 05/07/2022    CHF (congestive heart failure)     Chronic fatigue     Cirrhosis of liver with ascites     Depression     Diabetes mellitus     Encounter for blood transfusion 07/22/2020    Encounter for blood transfusion 03/2022    Fatty liver     GERD (gastroesophageal reflux disease)     Hx of psychiatric care     Hypertension     Iron deficiency anemia 05/07/2022    TIBC 444 with saturated iron 8    Left leg cellulitis 05/07/2022    Liver disease     Obese     Pre-diabetes 05/07/2022    Psychiatric problem     Sleep apnea     wears cpap    SOB (shortness of breath) on exertion     Weight loss     75lb intentional weight loss     Past Surgical History:   Procedure Laterality Date    ABLATION OF ARRHYTHMOGENIC FOCUS FOR ATRIAL FIBRILLATION N/A 07/20/2020    Procedure: Ablation atrial fibrillation;  Surgeon: Gabriel Hawley MD;  Location: [facility] EP LAB;  Service: Cardiology;  Laterality: N/A;  afib, PVI, RFA, REENA, SHADY, anes, MB, 3 Prep    ABLATION OF ARRHYTHMOGENIC FOCUS FOR ATRIAL FIBRILLATION N/A 01/24/2022    Procedure: Ablation atrial fibrillation;  Surgeon: Gabriel Hawley MD;  Location: [facility] EP LAB;  Service: Cardiology;  Laterality: N/A;  afib/afl, PVI (re-do)/CTI, RFA, REENA (cx if SR), SHADY, anes, MB, 3 Prep    APPLICATION OF WOUND VACUUM-ASSISTED CLOSURE DEVICE Left 08/03/2020    Procedure: APPLICATION, WOUND VAC;  Surgeon: SEMAJ Márquez III, MD;  Location: [facility] OR [address] [place] [address];  Service: Peripheral Vascular;  Laterality: Left;    APPLICATION OF WOUND VACUUM-ASSISTED CLOSURE DEVICE  Left 08/06/2020    Procedure: APPLICATION, WOUND VAC;  Surgeon: SEMAJ Márquez III, MD;  Location: [facility] OR 2ND FLR;  Service: Peripheral Vascular;  Laterality: Left;    CARPAL TUNNEL RELEASE Right 06/10/2020    Procedure: RELEASE, CARPAL TUNNEL - RIGHT;  Surgeon: Adelaida Hall MD;  Location: [facility] OR;  Service: Orthopedics;  Laterality: Right;  GENERAL AND REGIONAL    CARPAL TUNNEL RELEASE Left 05/05/2021    Procedure: RELEASE, CARPAL TUNNEL, LEFT;  Surgeon: Adelaida Hall MD;  Location: [facility] OR;  Service: Orthopedics;  Laterality: Left;  GENERAL & REGIONAL IN PACU    CARPECTOMY Left 09/06/2023    Procedure: CARPECTOMY;  Surgeon: Adelaida Hall MD;  Location: [facility] OR;  Service: Orthopedics;  Laterality: Left;  MAC/REGIONAL  LEFT PROXIMAL ROW    CLOSURE OF WOUND Left 08/06/2020    Procedure: CLOSURE, WOUND;  Surgeon: SEMAJ Márquez III, MD;  Location: [facility] OR 2ND FLR;  Service: Peripheral Vascular;  Laterality: Left;  Complex    COLONOSCOPY N/A 08/17/2021    Procedure: COLONOSCOPY Suprep;  Surgeon: Loco Deluca MD;  Location: [facility];  Service: Endoscopy;  Laterality: N/A;    ECHOCARDIOGRAM,TRANSESOPHAGEAL N/A 07/24/2023    Procedure: Transesophageal echo (REENA) intra-procedure log documentation;  Surgeon: Leyla Diagnostic Provider;  Location: [facility] EP LAB;  Service: Cardiology;  Laterality: N/A;  s/p WM, REENA, anes, 3 Prep    ESOPHAGOGASTRODUODENOSCOPY N/A 08/17/2021    Procedure: EGD (ESOPHAGOGASTRODUODENOSCOPY);  Surgeon: Loco Deluca MD;  Location: [facility] ENDO;  Service: Endoscopy;  Laterality: N/A;  Patient is schedule to have her Covid test on 08/14/2021 at the [facility] @ 9:30am. [place].    EXPLORATION OF FEMORAL ARTERY Left 07/21/2020    Procedure: EXPLORATION, ARTERY, FEMORAL;  Surgeon: SEMAJ Márquez III, MD;  Location: [facility] OR 2ND FLR;  Service: Peripheral Vascular;  Laterality: Left;  1. Urgent Direct repair, L SFA branch laceration    FOOT SURGERY       HYSTEROSCOPY WITH DILATION AND CURETTAGE OF UTERUS N/A 02/19/2022    Procedure: HYSTEROSCOPY, WITH DILATION AND CURETTAGE OF UTERUS;  Surgeon: Shane Palomo MD;  [address];  Service: OB/GYN;  Laterality: N/A;    INCISION AND DRAINAGE OF KNEE Left 05/12/2022    Procedure: INCISION AND DRAINAGE, Prepatellar bursectomy.;  Surgeon: Dre Guzmán MD;  [address];  Service: Orthopedics;  Laterality: Left;    LEFT HEART CATHETERIZATION Left 02/07/2023    Procedure: Left heart cath;  Surgeon: Josiah Terry MD;  Location: [facility] CATH LAB;  Service: Cardiology;  Laterality: Left;  borderline moderate bleeding risk 6.3%    OCCLUSION OF LEFT ATRIAL APPENDAGE N/A 06/12/2023    Procedure: Left atrial appendage occlusion;  Surgeon: Gabriel Hawley MD;  Location: [facility] EP LAB;  Service: Cardiology;  Laterality: N/A;  afib, watchman, BSCI, demi, ALIYA ibarra, 3prep    RELEASE OF ULNAR NERVE AT CUBITAL TUNNEL Bilateral     RIGHT HEART CATHETERIZATION Right 08/05/2024    Procedure: INSERTION, CATHETER, RIGHT HEART;  Surgeon: Zane Liu MD;  Location: [facility] CATH LAB;  Service: Cardiology;  Laterality: Right;    ROBOT-ASSISTED LAPAROSCOPIC ABDOMINAL HYSTERECTOMY USING DA KEIRY XI N/A 08/09/2022    Procedure: XI ROBOTIC HYSTERECTOMY;  Surgeon: Yolanda Barriga MD;  Location: [facility];  Service: OB/GYN;  Laterality: N/A;  dual console requested    ROBOT-ASSISTED LAPAROSCOPIC SALPINGO-OOPHORECTOMY Bilateral 08/09/2022    Procedure: ROBOTIC SALPINGO-OOPHORECTOMY;  Surgeon: Yolanda Barriga MD;  Location: [facility];  Service: OB/GYN;  Laterality: Bilateral;    TRANSESOPHAGEAL ECHOCARDIOGRAPHY N/A 06/12/2023    Procedure: ECHOCARDIOGRAM, TRANSESOPHAGEAL;  Surgeon: Cyril Mast MD;  Location: [facility] EP LAB;  Service: Cardiology;  Laterality: N/A;    TREATMENT OF CARDIAC ARRHYTHMIA N/A 01/29/2020    Procedure: CARDIOVERSION;  Surgeon: Gabriel Hawley MD;  Location: [facility] EP LAB;  Service:  Cardiology;  Laterality: N/A;  af, demi, dccv, anes, mb, 345    TREATMENT OF CARDIAC ARRHYTHMIA  01/24/2022    Procedure: Cardioversion or Defibrillation;  Surgeon: Gabriel Hawley MD;  Location: [facility] EP LAB;  Service: Cardiology;;    WOUND DEBRIDEMENT Left 08/06/2020    Procedure: DEBRIDEMENT, WOUND;  Surgeon: SEMAJ Márquez III, MD;  Location: [facility] OR [address] [place] [address];  Service: Peripheral Vascular;  Laterality: Left;       Review of Systems:   As documented in primary team H&P    Home Meds:   Prior to Admission medications    Medication Sig Start Date End Date Taking? Authorizing Provider   acetaminophen (TYLENOL) 500 MG tablet Take 2 tablets (1,000 mg total) by mouth every evening. 8/12/24   Brennon Nunez MD   albuterol (VENTOLIN HFA) 90 mcg/actuation inhaler Inhale 2 puffs into the lungs every 6 (six) hours as needed for Wheezing. Rescue 12/14/23 12/13/24  Gordy Cordero MD   ALPRAZolam (XANAX) 0.5 MG tablet Take 1 tablet (0.5 mg total) by mouth 2 (two) times daily as needed for Anxiety. 9/4/24   Gordy Cordero MD   b complex vitamins capsule Take 1 capsule by mouth every other day.    Provider, Historical   DULoxetine (CYMBALTA) 60 MG capsule Take 1 capsule (60 mg total) by mouth once daily. 8/12/24   Brennon Nunez MD   lactulose (CHRONULAC) 10 gram/15 mL solution Take 15 mLs (10 g total) by mouth 2 (two) times daily. Please ensure you are having at least 2-3 bowel movements every day. 8/12/24   Brennon Nunez MD   lisinopriL (PRINIVIL,ZESTRIL) 40 MG tablet Take 1 tablet (40 mg total) by mouth once daily. 6/27/24 6/27/25  Gordy Cordero MD   metoprolol succinate (TOPROL-XL) 100 MG 24 hr tablet Take 1 tablet (100 mg total) by mouth 2 (two) times daily. 1/17/24   Marah Hunter NP   multivitamin (THERAGRAN) per tablet Take 1 tablet by mouth once daily.    Provider, Historical   nystatin (MYCOSTATIN) powder Apply topically 2 (two) times daily. 9/4/24   Gordy Cordero MD   omeprazole (PRILOSEC)  20 MG capsule TAKE 1 CAPSULE BY MOUTH ONCE DAILY 7/25/24   Gordy Cordero MD   ondansetron (ZOFRAN-ODT) 4 MG TbDL Take 2 tablets (8 mg total) by mouth every 8 (eight) hours as needed (nasuea, vomiting). 8/23/24   Gordy Cordero MD   oxyCODONE (OXYCONTIN) 10 mg 12 hr tablet Take 1 tablet (10 mg total) by mouth daily as needed for Pain. 9/4/24   Gordy Cordero MD   traZODone (DESYREL) 100 MG tablet Take 1 tablet (100 mg total) by mouth nightly as needed for Insomnia. 9/4/24   Gordy Cordero MD   medroxyPROGESTERone (PROVERA) 10 MG tablet Take 2 tablets (20 mg total) by mouth 3 (three) times daily. 7/13/22 8/9/22  Yolanda Barriga MD     Scheduled Meds:    acetaminophen  1,000 mg Oral QHS    DULoxetine  60 mg Oral Daily    ferrous sulfate  1 tablet Oral Daily    fluconazole  100 mg Oral Daily    furosemide  20 mg Oral Daily    lactulose  10 g Oral BID    metoprolol succinate  100 mg Oral BID    miconazole NITRATE 2 %   Topical (Top) BID    midodrine  10 mg Oral TID    pantoprazole  40 mg Oral Daily    spironolactone  25 mg Oral Daily     Continuous Infusions:   PRN Meds:  Current Facility-Administered Medications:     acetaminophen, 325 mg, Oral, Q6H PRN    albuterol, 2 puff, Inhalation, Q6H PRN    ALPRAZolam, 0.5 mg, Oral, BID PRN    dextrose 10%, 12.5 g, Intravenous, PRN    dextrose 10%, 25 g, Intravenous, PRN    glucagon (human recombinant), 1 mg, Intramuscular, PRN    glucose, 16 g, Oral, PRN    glucose, 24 g, Oral, PRN    insulin aspart U-100, 0-5 Units, Subcutaneous, QID (AC + HS) PRN    ondansetron, 8 mg, Oral, Q8H PRN    oxyCODONE, 10 mg, Oral, Daily PRN    traZODone, 100 mg, Oral, Nightly PRN  Anticoagulants/Antiplatelets: no anticoagulation    Allergies:   Review of patient's allergies indicates:   Allergen Reactions    Flecainide Shortness Of Breath and Swelling    Shellfish containing products Other (See Comments)     Makes gout terrible    Vancomycin analogues Hives, Itching and Rash      Sedation Hx: have not been any systemic reactions    Vitals:  Temp: 97.6 °F (36.4 °C) (09/12/24 0752)  Pulse: 60 (09/12/24 0920)  Resp: 18 (09/12/24 0920)  BP: 91/69 (09/12/24 0920)  SpO2: 96 % (09/12/24 0920)     Physical Exam:  ASA: 3  Mallampati: n/a    General: no acute distress  Mental Status: alert and oriented to person, place and time  HEENT: normocephalic, atraumatic  Chest: unlabored breathing  Heart: regular heart rate  Abdomen: distended  Extremity: moves all extremities    Plan: US guided paracentesis  Sedation Plan: Local     Shannen Callahan PA-C  Interventional Radiology  [phone]

## 2024-09-12 NOTE — PLAN OF CARE
Paracentesis done. Removed 6500 ml. Albumin 50 grams administered per protocol. Patient AAOx3, no distress noted, respirations even and unlabored,  VSS. LLQ site clean, dry, and intact; no bleeding or hematoma noted. Patient to be transferred to room 8084 via patient transporter. Patient stable for transport. Phone report given to ary Miller primary nurse.

## 2024-09-12 NOTE — PLAN OF CARE
09/12/24 1324   Readmission   Was this a planned readmission? No   Why were you hospitalized in the last 30 days? SOB, CHF   Why were you readmitted? New medical problem   When you left the hospital how did you feel? good   When you left the hospital where did you go? SNF   Did patient/caregiver refused recommended DC plan? No   Tell me about what happened between when you left the hospital and the day you returned. weakness, AMS   When did you start not feeling well? 2 days ago   Did you try to manage your symptoms your self? Yes   What did you do? rest   Did you call anyone? No   Why? had MD appt 9/10   Did you try to see or did see a doctor or nurse before you came? Yes   Were you seen? Yes   Did you have  a follow-up appointment on discharge? Yes   Did you go? Yes

## 2024-09-12 NOTE — PLAN OF CARE
Problem: Adult Inpatient Plan of Care  Goal: Plan of Care Review  Outcome: Progressing  Goal: Patient-Specific Goal (Individualized)  Outcome: Progressing  Goal: Absence of Hospital-Acquired Illness or Injury  Outcome: Progressing  Goal: Optimal Comfort and Wellbeing  Outcome: Progressing  Goal: Readiness for Transition of Care  Outcome: Progressing     Problem: Diabetes Comorbidity  Goal: Blood Glucose Level Within Targeted Range  Outcome: Progressing     Problem: Wound  Goal: Optimal Coping  Outcome: Progressing  Goal: Optimal Functional Ability  Outcome: Progressing  Goal: Absence of Infection Signs and Symptoms  Outcome: Progressing  Goal: Improved Oral Intake  Outcome: Progressing  Goal: Optimal Pain Control and Function  Outcome: Progressing  Goal: Skin Health and Integrity  Outcome: Progressing  Goal: Optimal Wound Healing  Outcome: Progressing     Problem: Skin Injury Risk Increased  Goal: Skin Health and Integrity  Outcome: Progressing  Patient in bed. No complaints voiced. NADN at this time. Safety measures in place. Call light in reach.

## 2024-09-12 NOTE — PROCEDURES
Radiology Post-Procedure Note    Pre Op Diagnosis: Ascites  Post Op Diagnosis: Same    Procedure: Ultrasound Guided Paracentesis    Procedure performed by: Shannen Callahan PA-C    Written Informed Consent Obtained: Yes  Specimen Removed: YES (yellow, clear)  Estimated Blood Loss: Minimal    Findings:   Successful paracentesis from LLQ.  Albumin administered PRN per protocol.    Patient tolerated procedure well.    Patient's catheter came out once and I replaced catheter.     Then nurse witnessed patient moved her arm and pull her catheter out again. Unable to stick patient for a third time.     Shannen Callahan PA-C  Interventional Radiology  [phone]

## 2024-09-12 NOTE — CARE UPDATE
I have reviewed the chart of Vickycrow Rachel Christiano and collaborated with Eugene Woodward MD in the care of the patient who is hospitalized for the following:    Active Hospital Problems    Diagnosis    *Cirrhosis of liver with ascites    Pain syndrome, chronic    Anxiety    Hyponatremia    Irritant contact dermatitis due to fecal, urinary or dual incontinence    Iron deficiency anemia     TIBC 444 with saturated iron 8      Gastroesophageal reflux disease without esophagitis    Recurrent major depressive disorder, in partial remission    ERENDIRA (obstructive sleep apnea)    Type 2 diabetes mellitus with diabetic polyneuropathy, without long-term current use of insulin    Atrial Fibrillation (paroxysmal)    Essential hypertension          I have reviewed Vicky Alvarado with the multidisciplinary team during discharge huddle.       Sarah Harding PA-C  [facility]

## 2024-09-12 NOTE — PLAN OF CARE
Patient AAOx3, no distress noted, respirations even and unlabored, VSS. Acceptance of education, consents signed, H/P done. Labs reviewed. Patient in MPU Room 3 for paracentesis procedure. Warm blankets applied to patient. Patient prepped and draped in sterile fashion.
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
## 2024-09-12 NOTE — SUBJECTIVE & OBJECTIVE
Interval History:     Monitor pressures. Bradycardia reported to be her baseline. Asymptomatic. 5 BM, will decrease lactulose.     Review of Systems   Constitutional:  Negative for chills and fever.   Gastrointestinal:  Negative for nausea and vomiting.   Neurological:  Negative for seizures and syncope.     Objective:     Vital Signs (Most Recent):  Temp: 97.6 °F (36.4 °C) (09/12/24 0752)  Pulse: (!) 47 (09/12/24 1550)  Resp: 18 (09/12/24 1550)  BP: 115/70 (09/12/24 1550)  SpO2: 99 % (09/12/24 1550) Vital Signs (24h Range):  Temp:  [97.6 °F (36.4 °C)-98 °F (36.7 °C)] 97.6 °F (36.4 °C)  Pulse:  [45-70] 47  Resp:  [16-20] 18  SpO2:  [95 %-100 %] 99 %  BP: ()/(50-70) 115/70     Weight: 81.6 kg (180 lb)  Body mass index is 31.89 kg/m².    Intake/Output Summary (Last 24 hours) at 9/12/2024 1758  Last data filed at 9/12/2024 1034  Gross per 24 hour   Intake 400 ml   Output 6650 ml   Net -6250 ml         Physical Exam  Vitals and nursing note reviewed.   Constitutional:       General: She is not in acute distress.     Appearance: She is well-developed. She is obese. She is not diaphoretic.      Interventions: She is not intubated.  Eyes:      Extraocular Movements: Extraocular movements intact.   Cardiovascular:      Rate and Rhythm: Tachycardia present. Rhythm irregular.   Pulmonary:      Effort: Pulmonary effort is normal. No accessory muscle usage or respiratory distress. She is not intubated.   Abdominal:      General: There is distension.   Skin:     Capillary Refill: Capillary refill takes less than 2 seconds.   Neurological:      Mental Status: She is alert and oriented to person, place, and time. Mental status is at baseline.      Motor: No seizure activity.   Psychiatric:         Attention and Perception: Attention normal.         Mood and Affect: Mood and affect normal.         Behavior: Behavior is cooperative.             Significant Labs: All pertinent labs within the past 24 hours have been  reviewed.    Recent Labs   Lab 09/12/24  0512   CALCIUM 8.8   ALBUMIN 2.0*   PROT 5.4*   *   K 3.7   CO2 20*      BUN 34*   CREATININE 1.0   ALKPHOS 227*   ALT 5*   AST 20   BILITOT 0.4       Recent Labs   Lab 09/12/24  1305   WBC 8.31   RBC 2.89*   HGB 8.5*   HCT 25.1*      MCV 87   MCH 29.4   MCHC 33.9         Significant Imaging: I have reviewed all pertinent imaging results/findings within the past 24 hours.

## 2024-09-12 NOTE — PLAN OF CARE
Readmission    CM to bedside - pt provided assessment info. Pt w/ DME in place, lives w/ sister, brother in law, niece and nephew. Pt will likely d/c home w/ no needs but mat benefit from HH. Per conversation, pt's niece is assisting pt apply for assistance in home (likely Medicaid waiver program). Pt lives in a 1 story home w/ 1 step to enter. CM contacted IR and verified that pt does have standing orders in place for weekly para's. Pt states she does have ride issues occasionally and CM relayed that [facility] [facility] Medicaid transport info will be placed in her discharge paperwork. Pt went to [facility] at her last d/c and competed therapy - she discharged to home Friday 9/6.    EUSEBIA SánchezN, RN, [facility]  Transitional Care Manager  [phone]  [email or web address] - Telemetry Stepdown  Initial Discharge Assessment       Primary Care Provider: Gordy Cordero MD    Admission Diagnosis: Ascites [R18.8]    Admission Date: 9/10/2024  Expected Discharge Date: 9/14/2024    Transition of Care Barriers: None    Payor: MEDICAID / Plan: [employer] [facility] / Product Type: Managed Medicaid /     Extended Emergency Contact Information  Primary Emergency Contact: Zully Arita  Address: [address] [place] [address] of [facility]  Home Phone: [phone]  Mobile Phone: [phone]  Relation: Sister  Secondary Emergency Contact: NICOLAS GRAHAM  Mobile Phone: [phone]  Relation: Brother  Preferred language: English   needed? No    Discharge Plan A: Home with family  Discharge Plan B: Home with family, Home Health      [facility]  [address] [address]  Phone: [phone] Fax: [phone]    [facility]  [address]  [facility] [address]  Phone: [phone] Fax: [phone]    [place] [address] [address]  [facility]  [place] [address]  Phone: [phone] Fax: [phone]    [facility]  [address]  [place] [address]  Phone: [phone] Fax: [phone]    [id number] - [place] - [address] [facility] [address] [facility]  [address]  [place] [address]  Phone: [phone] Fax: [phone]      Initial Assessment (most recent)       Adult Discharge Assessment - 09/12/24 1326          Discharge Assessment    Assessment Type Discharge Planning Assessment     Confirmed/corrected address, phone number and insurance Yes     Confirmed Demographics Correct on Facesheet     Source of Information patient     When was your last doctors appointment? 09/10/24     Communicated RAYO with patient/caregiver Yes     Reason For Admission weakness, AMS     People in Home sibling(s)     Do you expect to return to your current living situation? Yes     Do you have help at home or someone to help you manage your care at home? Yes     Who are your caregiver(s) and their phone number(s)? sister Zully [phone]     Prior to hospitilization cognitive status: Not Oriented to Place;Not Oriented to Time     Current cognitive status: Alert/Oriented     Walking or Climbing Stairs Difficulty yes     Walking or Climbing Stairs ambulation difficulty, requires equipment     Mobility Management RW, w/c     Dressing/Bathing Difficulty yes     Dressing/Bathing bathing difficulty, requires equipment     Dressing/Bathing Management shower chair     Do you have any problems with: Errands/Grocery     Home Accessibility wheelchair accessible     Home Layout Able to live on 1st floor     Equipment Currently Used at Home power chair;CPAP;shower chair;bedside commode;walker, rolling;wheelchair     Readmission within 30 days? Yes     Patient currently being followed by outpatient case management? Unable to determine (comments)     Do you currently have service(s) that help  you manage your care at home? No   niece is helping pt provide info for waiver    Do you take prescription medications? Yes     Who is going to help you get home at discharge? sister     How do you get to doctors appointments? family or friend will provide;health plan transportation     Are you on dialysis? No     Do you take coumadin? No     Discharge Plan A Home with family     Discharge Plan B Home with family;Home Health     DME Needed Upon Discharge  none     Discharge Plan discussed with: Patient     Transition of Care Barriers None        Physical Activity    On average, how many days per week do you engage in moderate to strenuous exercise (like a brisk walk)? 0 days     On average, how many minutes do you engage in exercise at this level? 0 min        Financial Resource Strain    How hard is it for you to pay for the very basics like food, housing, medical care, and heating? Not very hard        Housing Stability    In the last 12 months, was there a time when you were not able to pay the mortgage or rent on time? No     At any time in the past 12 months, were you homeless or living in a shelter (including now)? No        Transportation Needs    Has the lack of transportation kept you from medical appointments, meetings, work or from getting things needed for daily living? Yes, it has kept me from medical appointments or from getting my medications.        Food Insecurity    Within the past 12 months, you worried that your food would run out before you got the money to buy more. Never true     Within the past 12 months, the food you bought just didn't last and you didn't have money to get more. Never true        Stress    Do you feel stress - tense, restless, nervous, or anxious, or unable to sleep at night because your mind is troubled all the time - these days? To some extent        Social Isolation    How often do you feel lonely or isolated from those around you?  Rarely        Alcohol Use    Q1: How  often do you have a drink containing alcohol? Never     Q2: How many drinks containing alcohol do you have on a typical day when you are drinking? Patient does not drink     Q3: How often do you have six or more drinks on one occasion? Never        Utilities    In the past 12 months has the electric, gas, oil, or water company threatened to shut off services in your home? No        Health Literacy    How often do you need to have someone help you when you read instructions, pamphlets, or other written material from your doctor or pharmacy? Rarely                     Readmission Assessment (most recent)       Readmission Assessment - 09/12/24 1324          Readmission    Was this a planned readmission? No     Why were you hospitalized in the last 30 days? SOB, CHF     Why were you readmitted? New medical problem     When you left the hospital how did you feel? good     When you left the hospital where did you go? SNF     Did patient/caregiver refused recommended DC plan? No     Tell me about what happened between when you left the hospital and the day you returned. weakness, AMS     When did you start not feeling well? 2 days ago     Did you try to manage your symptoms your self? Yes     What did you do? rest     Did you call anyone? No     Why? had MD appt 9/10     Did you try to see or did see a doctor or nurse before you came? Yes     Were you seen? Yes     Did you have  a follow-up appointment on discharge? Yes     Did you go? Yes

## 2024-09-13 ENCOUNTER — TELEPHONE (OUTPATIENT)
Dept: HEPATOLOGY | Facility: CLINIC | Age: 59
End: 2024-09-13
Payer: MEDICAID

## 2024-09-13 PROBLEM — R53.81 DEBILITY: Status: ACTIVE | Noted: 2024-09-13

## 2024-09-13 LAB
ALBUMIN SERPL BCP-MCNC: 2.6 G/DL (ref 3.5–5.2)
ALP SERPL-CCNC: 215 U/L (ref 55–135)
ALT SERPL W/O P-5'-P-CCNC: 5 U/L (ref 10–44)
ANION GAP SERPL CALC-SCNC: 9 MMOL/L (ref 8–16)
ASCENDING AORTA: 2.55 CM
AST SERPL-CCNC: 20 U/L (ref 10–40)
AV INDEX (PROSTH): 0.53
AV MEAN GRADIENT: 12 MMHG
AV PEAK GRADIENT: 23 MMHG
AV VALVE AREA BY VELOCITY RATIO: 2.06 CM²
AV VALVE AREA: 1.93 CM²
AV VELOCITY RATIO: 0.56
BASOPHILS # BLD AUTO: 0.05 K/UL (ref 0–0.2)
BASOPHILS NFR BLD: 0.9 % (ref 0–1.9)
BILIRUB SERPL-MCNC: 0.5 MG/DL (ref 0.1–1)
BSA FOR ECHO PROCEDURE: 1.9 M2
BUN SERPL-MCNC: 25 MG/DL (ref 6–20)
CALCIUM SERPL-MCNC: 8.9 MG/DL (ref 8.7–10.5)
CHLORIDE SERPL-SCNC: 109 MMOL/L (ref 95–110)
CO2 SERPL-SCNC: 17 MMOL/L (ref 23–29)
CREAT SERPL-MCNC: 0.9 MG/DL (ref 0.5–1.4)
CV ECHO LV RWT: 0.24 CM
DIFFERENTIAL METHOD BLD: ABNORMAL
DOP CALC AO PEAK VEL: 2.4 M/S
DOP CALC AO VTI: 54.77 CM
DOP CALC LVOT AREA: 3.7 CM2
DOP CALC LVOT DIAMETER: 2.16 CM
DOP CALC LVOT PEAK VEL: 1.35 M/S
DOP CALC LVOT STROKE VOLUME: 105.81 CM3
DOP CALCLVOT PEAK VEL VTI: 28.89 CM
E/E' RATIO: 11.53 M/S
ECHO LV POSTERIOR WALL: 0.64 CM (ref 0.6–1.1)
EOSINOPHIL # BLD AUTO: 0.2 K/UL (ref 0–0.5)
EOSINOPHIL NFR BLD: 2.9 % (ref 0–8)
ERYTHROCYTE [DISTWIDTH] IN BLOOD BY AUTOMATED COUNT: 16.9 % (ref 11.5–14.5)
EST. GFR  (NO RACE VARIABLE): >60 ML/MIN/1.73 M^2
FRACTIONAL SHORTENING: 38 % (ref 28–44)
GLUCOSE SERPL-MCNC: 86 MG/DL (ref 70–110)
HCT VFR BLD AUTO: 27 % (ref 37–48.5)
HGB BLD-MCNC: 8.3 G/DL (ref 12–16)
IMM GRANULOCYTES # BLD AUTO: 0.03 K/UL (ref 0–0.04)
IMM GRANULOCYTES NFR BLD AUTO: 0.5 % (ref 0–0.5)
INR PPP: 1 (ref 0.8–1.2)
INTERVENTRICULAR SEPTUM: 0.74 CM (ref 0.6–1.1)
IVRT: 142.72 MSEC
LA MAJOR: 7.19 CM
LA MINOR: 6.5 CM
LA WIDTH: 4.38 CM
LEFT ATRIUM SIZE: 5.77 CM
LEFT ATRIUM VOLUME INDEX MOD: 53.3 ML/M2
LEFT ATRIUM VOLUME INDEX: 79.3 ML/M2
LEFT ATRIUM VOLUME MOD: 98.68 CM3
LEFT ATRIUM VOLUME: 146.67 CM3
LEFT INTERNAL DIMENSION IN SYSTOLE: 3.35 CM (ref 2.1–4)
LEFT VENTRICLE DIASTOLIC VOLUME INDEX: 74.98 ML/M2
LEFT VENTRICLE DIASTOLIC VOLUME: 138.72 ML
LEFT VENTRICLE MASS INDEX: 69 G/M2
LEFT VENTRICLE SYSTOLIC VOLUME INDEX: 24.8 ML/M2
LEFT VENTRICLE SYSTOLIC VOLUME: 45.88 ML
LEFT VENTRICULAR INTERNAL DIMENSION IN DIASTOLE: 5.36 CM (ref 3.5–6)
LEFT VENTRICULAR MASS: 127.22 G
LV LATERAL E/E' RATIO: 8.91 M/S
LV SEPTAL E/E' RATIO: 16.33 M/S
LYMPHOCYTES # BLD AUTO: 0.7 K/UL (ref 1–4.8)
LYMPHOCYTES NFR BLD: 13 % (ref 18–48)
MAGNESIUM SERPL-MCNC: 1.8 MG/DL (ref 1.6–2.6)
MCH RBC QN AUTO: 28.2 PG (ref 27–31)
MCHC RBC AUTO-ENTMCNC: 30.7 G/DL (ref 32–36)
MCV RBC AUTO: 92 FL (ref 82–98)
MONOCYTES # BLD AUTO: 0.6 K/UL (ref 0.3–1)
MONOCYTES NFR BLD: 11.7 % (ref 4–15)
MV PEAK E VEL: 0.98 M/S
NEUTROPHILS # BLD AUTO: 3.9 K/UL (ref 1.8–7.7)
NEUTROPHILS NFR BLD: 71 % (ref 38–73)
NRBC BLD-RTO: 0 /100 WBC
PHOSPHATE SERPL-MCNC: 3.8 MG/DL (ref 2.7–4.5)
PISA TR MAX VEL: 3.05 M/S
PLATELET # BLD AUTO: 221 K/UL (ref 150–450)
PMV BLD AUTO: 9.9 FL (ref 9.2–12.9)
POTASSIUM SERPL-SCNC: 3.9 MMOL/L (ref 3.5–5.1)
PROT SERPL-MCNC: 5.7 G/DL (ref 6–8.4)
PROTHROMBIN TIME: 11 SEC (ref 9–12.5)
RA MAJOR: 5.61 CM
RA PRESSURE ESTIMATED: 3 MMHG
RA WIDTH: 4.07 CM
RBC # BLD AUTO: 2.94 M/UL (ref 4–5.4)
RIGHT VENTRICLE DIASTOLIC BASEL DIMENSION: 5.1 CM
RV TB RVSP: 6 MMHG
SINUS: 3.03 CM
SODIUM SERPL-SCNC: 135 MMOL/L (ref 136–145)
STJ: 2.26 CM
TDI LATERAL: 0.11 M/S
TDI SEPTAL: 0.06 M/S
TDI: 0.09 M/S
TR MAX PG: 37 MMHG
TRICUSPID ANNULAR PLANE SYSTOLIC EXCURSION: 2.37 CM
TV REST PULMONARY ARTERY PRESSURE: 40 MMHG
WBC # BLD AUTO: 5.48 K/UL (ref 3.9–12.7)
Z-SCORE OF LEFT VENTRICULAR DIMENSION IN END DIASTOLE: 0.42
Z-SCORE OF LEFT VENTRICULAR DIMENSION IN END SYSTOLE: 0.43

## 2024-09-13 PROCEDURE — 80053 COMPREHEN METABOLIC PANEL: CPT | Performed by: HOSPITALIST

## 2024-09-13 PROCEDURE — 97165 OT EVAL LOW COMPLEX 30 MIN: CPT

## 2024-09-13 PROCEDURE — 94799 UNLISTED PULMONARY SVC/PX: CPT

## 2024-09-13 PROCEDURE — 83735 ASSAY OF MAGNESIUM: CPT | Performed by: STUDENT IN AN ORGANIZED HEALTH CARE EDUCATION/TRAINING PROGRAM

## 2024-09-13 PROCEDURE — 25000003 PHARM REV CODE 250: Performed by: STUDENT IN AN ORGANIZED HEALTH CARE EDUCATION/TRAINING PROGRAM

## 2024-09-13 PROCEDURE — 20600001 HC STEP DOWN PRIVATE ROOM

## 2024-09-13 PROCEDURE — 25000003 PHARM REV CODE 250: Performed by: HOSPITALIST

## 2024-09-13 PROCEDURE — 99900035 HC TECH TIME PER 15 MIN (STAT)

## 2024-09-13 PROCEDURE — 85610 PROTHROMBIN TIME: CPT | Performed by: STUDENT IN AN ORGANIZED HEALTH CARE EDUCATION/TRAINING PROGRAM

## 2024-09-13 PROCEDURE — 99223 1ST HOSP IP/OBS HIGH 75: CPT | Mod: ,,, | Performed by: INTERNAL MEDICINE

## 2024-09-13 PROCEDURE — 36415 COLL VENOUS BLD VENIPUNCTURE: CPT | Performed by: STUDENT IN AN ORGANIZED HEALTH CARE EDUCATION/TRAINING PROGRAM

## 2024-09-13 PROCEDURE — 97116 GAIT TRAINING THERAPY: CPT

## 2024-09-13 PROCEDURE — 94660 CPAP INITIATION&MGMT: CPT

## 2024-09-13 PROCEDURE — 27100171 HC OXYGEN HIGH FLOW UP TO 24 HOURS

## 2024-09-13 PROCEDURE — 97535 SELF CARE MNGMENT TRAINING: CPT

## 2024-09-13 PROCEDURE — 94761 N-INVAS EAR/PLS OXIMETRY MLT: CPT

## 2024-09-13 PROCEDURE — 85025 COMPLETE CBC W/AUTO DIFF WBC: CPT | Performed by: STUDENT IN AN ORGANIZED HEALTH CARE EDUCATION/TRAINING PROGRAM

## 2024-09-13 PROCEDURE — 5A09457 ASSISTANCE WITH RESPIRATORY VENTILATION, 24-96 CONSECUTIVE HOURS, CONTINUOUS POSITIVE AIRWAY PRESSURE: ICD-10-PCS | Performed by: STUDENT IN AN ORGANIZED HEALTH CARE EDUCATION/TRAINING PROGRAM

## 2024-09-13 PROCEDURE — 97162 PT EVAL MOD COMPLEX 30 MIN: CPT

## 2024-09-13 PROCEDURE — 84100 ASSAY OF PHOSPHORUS: CPT | Performed by: STUDENT IN AN ORGANIZED HEALTH CARE EDUCATION/TRAINING PROGRAM

## 2024-09-13 RX ADMIN — METHOCARBAMOL 500 MG: 500 TABLET ORAL at 12:09

## 2024-09-13 RX ADMIN — DULOXETINE HYDROCHLORIDE 60 MG: 60 CAPSULE, DELAYED RELEASE ORAL at 08:09

## 2024-09-13 RX ADMIN — MIDODRINE HYDROCHLORIDE 10 MG: 5 TABLET ORAL at 09:09

## 2024-09-13 RX ADMIN — MICONAZOLE NITRATE 2 % TOPICAL POWDER: at 09:09

## 2024-09-13 RX ADMIN — MIDODRINE HYDROCHLORIDE 10 MG: 5 TABLET ORAL at 08:09

## 2024-09-13 RX ADMIN — METHOCARBAMOL 500 MG: 500 TABLET ORAL at 09:09

## 2024-09-13 RX ADMIN — MICONAZOLE NITRATE: 20 OINTMENT TOPICAL at 09:09

## 2024-09-13 RX ADMIN — MICONAZOLE NITRATE 2 % TOPICAL POWDER: at 08:09

## 2024-09-13 RX ADMIN — ALPRAZOLAM 0.5 MG: 0.5 TABLET ORAL at 12:09

## 2024-09-13 RX ADMIN — METOPROLOL SUCCINATE 100 MG: 100 TABLET, EXTENDED RELEASE ORAL at 09:09

## 2024-09-13 RX ADMIN — MIDODRINE HYDROCHLORIDE 10 MG: 5 TABLET ORAL at 03:09

## 2024-09-13 RX ADMIN — ALPRAZOLAM 0.5 MG: 0.5 TABLET ORAL at 09:09

## 2024-09-13 RX ADMIN — OXYCODONE 5 MG: 5 TABLET ORAL at 12:09

## 2024-09-13 RX ADMIN — METOPROLOL SUCCINATE 100 MG: 100 TABLET, EXTENDED RELEASE ORAL at 08:09

## 2024-09-13 RX ADMIN — SPIRONOLACTONE 25 MG: 25 TABLET ORAL at 08:09

## 2024-09-13 RX ADMIN — LACTULOSE 10 G: 20 SOLUTION ORAL at 08:09

## 2024-09-13 RX ADMIN — FERROUS SULFATE TAB EC 325 MG (65 MG FE EQUIVALENT) 1 EACH: 325 (65 FE) TABLET DELAYED RESPONSE at 08:09

## 2024-09-13 RX ADMIN — TRAZODONE HYDROCHLORIDE 100 MG: 100 TABLET ORAL at 09:09

## 2024-09-13 RX ADMIN — PANTOPRAZOLE SODIUM 40 MG: 40 TABLET, DELAYED RELEASE ORAL at 08:09

## 2024-09-13 RX ADMIN — OXYCODONE 5 MG: 5 TABLET ORAL at 09:09

## 2024-09-13 RX ADMIN — FUROSEMIDE 20 MG: 20 TABLET ORAL at 08:09

## 2024-09-13 NOTE — ASSESSMENT & PLAN NOTE
Patient with Acute on chronic debility due to age-related physical debility, other reduced mobility, and worsening debility .  Evaluation for etiology is complete. Plan includes PT/OT consulted.

## 2024-09-13 NOTE — HPI
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 Patient had Hepatology appointment on 9/10 with Dr. Saba and was found to be short of breath with tense ascites. She was directed to present to the ED for admission and paracentesis. She underwent LVP on 9/12 and had 6.5L removed with administration of 50g of albumin. She reports she has had several LVPs in the past month, 8/6, 8/13, and 8/23 with about 10L of ascitic fluid removed each time.  Patient reports having missed some outpatient paracentesis appointments due to confusion over scheduling; it seems she had already been established with Dr. Saba, but was set up for appointments with Dr. Massey after she was discharged from last hospitalization on 8/13. She is unsure which of her outpatient medications she should be taking. She denies any fevers, chills, abdominal pain, chest pain, or n/v/d. Hepatology consulted for assessment of TIPS in the setting of refractory, large volume ascites.

## 2024-09-13 NOTE — ASSESSMENT & PLAN NOTE
Patient with known Cirrhosis with Child's class Calculator for Child's score link- https://www.Peer[employer].com/calc/340/child-tilley-score-cirrhosis-mortality#creator-insights. Co-morbidities are present and inclusive of ascites, portal hypertension, and hepatic encephalopathy.  MELD-Na score calculated; MELD 3.0: 12 at 9/12/2024  5:12 AM  MELD-Na: 6 at 9/12/2024  5:12 AM  Calculated from:  Serum Creatinine: 1.0 mg/dL at 9/12/2024  5:12 AM  Serum Sodium: 135 mmol/L at 9/12/2024  5:12 AM  Total Bilirubin: 0.4 mg/dL (Using min of 1 mg/dL) at 9/12/2024  5:12 AM  Serum Albumin: 2.0 g/dL at 9/12/2024  5:12 AM  INR(ratio): 1.0 at 9/10/2024  3:31 PM  Age at listing (hypothetical): 59 years  Sex: Female at 9/12/2024  5:12 AM      Continue chronic meds. Etiology likely NAFLD. Will avoid any hepatotoxic meds, and monitor CBC/CMP/INR for synthetic function.   Continue home lactulose. Start furosemide and spironolactone at lowest doses. Titrate doses up. Paracentesis when IR can perform it. Her hepatologist will set her up for routine outpatient paracentesis.

## 2024-09-13 NOTE — TELEPHONE ENCOUNTER
----- Message from Laurie Massey MD sent at 9/13/2024  8:48 AM CDT -----  Please inform patient:   Red blood cell count has dropped significantly since last measurement a month ago.  Pls call patient and see if she is passing blood anywhere, specifically , in her stools, black or red stools, vomited blood. She needs to get another CBC today, urgently.

## 2024-09-13 NOTE — PT/OT/SLP EVAL
"Physical Therapy Evaluation    Patient Name:  Vicky Alvarado   MRN:  [id number]    Recommendations:     Discharge Recommendations: Low Intensity Therapy   Discharge Equipment Recommendations: none   Barriers to discharge:  decreased caregiver assist - alone during daytime    Assessment:     Vicky Alvarado is a 59 y.o. female admitted with a medical diagnosis of Cirrhosis of liver with ascites.  She presents with the following impairments/functional limitations: weakness, impaired endurance, impaired self care skills, impaired functional mobility, gait instability, impaired balance, decreased upper extremity function, decreased lower extremity function, decreased safety awareness, pain, impaired coordination, edema Patient with good participation this date, agreeable to evaluation and tolerating OOB positioning in chair. Primary limitation is global weakness and poor endurance. Patient will continue to benefit from skilled PT during this admit to address BLE strength and endurance deficits, and maximize independence with functional mobility.    Rehab Prognosis: Good; patient would benefit from acute skilled PT services to address these deficits and reach maximum level of function.    Recent Surgery: * No surgery found *      Plan:     During this hospitalization, patient to be seen 4 x/week to address the identified rehab impairments via gait training, therapeutic activities, therapeutic exercises, neuromuscular re-education, wheelchair management/training and progress toward the following goals:    Plan of Care Expires:  10/13/24    Subjective     Chief Complaint: "My wrist is just sore"  Patient/Family Comments/goals: return home to [facility]  Pain/Comfort:  Pain Rating 1:  ("soreness")  Location - Side 1: Left  Location - Orientation 1: generalized  Location 1: wrist  Pain Rating Post-Intervention 1:  ("soreness")    Patients cultural, spiritual, Confucianist conflicts given the current situation: " no    Living Environment:  Patient lives in  a [facility] with 1 MELI, along with sister, brother in law, adult nieces // nephews. Bathroom contains tub-shower combo with  shower chair inside.  Prior to admission, patients level of function was requiring assist over the last several months with RW. Assist from nephews to complete stair negotiation + RW. Recently hospitalized in August, completed SNF stay at [facility] and was home 3 days prior to this admit. Uses manual w/c within the home for most safety + stability. Frequent falls, including one in the last 3 days PTA. 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 BSC used next to bed for patient with incontinence.. Equipment used at home: walker, rolling, wheelchair, shower chair, CPAP, bedside commode (motorized scooter).  DME owned (not currently used): none.  Upon discharge, patient will have assistance from family, but not 24/7.    Objective:     Communicated with RN prior to session.  Patient found HOB elevated with telemetry  upon PT entry to room.    General Precautions: Standard, fall  Orthopedic Precautions:N/A   Braces: N/A  Respiratory Status: Room air    Exams:  Cognitive Exam:  Patient is oriented to Person, Place, Time, and Situation  Gross Motor Coordination:  WFL  Postural Exam:  Patient presented with the following abnormalities:    -       Rounded shoulders  -       Forward head  RLE ROM: WFL  RLE Strength: hip flex 3/5, knee + ankle 3+/5  LLE ROM: WFL  LLE Strength: hip flex 3/5, ankle + knee 3+/5    Functional Mobility:  Bed Mobility:     Scooting: stand by assistance  Supine to Sit: stand by assistance and HOB elevated, increased time  Transfers:     Sit to Stand:  minimum assistance with rolling walker  Bed to chair: min A + RW, step transfer  Gait: 4 ft bed>chair with RW + min A, 2nd person present for safety.   Poor floor clearance, decreased paulette, but no LOB  Balance:   Sitting: SBA at EOB  Standing: min A + BUE support on RW      AM-PAC 6 CLICK  MOBILITY  Total Score:16       Treatment & Education:  Patient educated on role of PT in acute care, PT POC, and PT goals.  Patient educated on calling for assistance for any needs to improve overall safety awareness..  Patient required co-evaluation with OT for highest quality care d/t decreased activity tolerance and increased level of assistance necessary.  All items placed within reach, and notified on call light usage for assistance with any needs for fall prevention.      Patient left up in chair with all lines intact, call button in reach, and RN notified.    GOALS:   Multidisciplinary Problems       Physical Therapy Goals          Problem: Physical Therapy    Goal Priority Disciplines Outcome Goal Variances Interventions   Physical Therapy Goal     PT, PT/OT Progressing     Description: Goals to be met by: 24     Patient will increase functional independence with mobility by performin. Supine to sit with [place]  2. Sit to supine with [place]  3. Sit to stand transfer with Stand-by Assistance  4. Bed to chair transfer with Stand-by Assistance using Rolling Walker  5. Gait  x 50 feet with Stand-by Assistance using Rolling Walker.   6. Ascend/Descend 4 inch curb step with Stand-by Assistance using Rolling Walker.  7. Lower extremity exercise program x15 reps per handout, with assistance as needed                         History:     Past Medical History:   Diagnosis Date    Anticoagulant long-term use     Arthritis     Atrial fibrillation     cardioversion    Atrial fibrillation with RVR     HAS WATCHMAN IN PLACE NOW    Avascular necrosis     L hand    Cellulitis of extremity 2022    CHF (congestive heart failure)     Chronic fatigue     Cirrhosis of liver with ascites     Depression     Diabetes mellitus     Encounter for blood transfusion 2020    Encounter for blood transfusion 2022    Fatty liver     GERD (gastroesophageal reflux disease)     Hx of psychiatric care      Hypertension     Iron deficiency anemia 05/07/2022    TIBC 444 with saturated iron 8    Left leg cellulitis 05/07/2022    Liver disease     Obese     Pre-diabetes 05/07/2022    Psychiatric problem     Sleep apnea     wears cpap    SOB (shortness of breath) on exertion     Weight loss     75lb intentional weight loss       Past Surgical History:   Procedure Laterality Date    ABLATION OF ARRHYTHMOGENIC FOCUS FOR ATRIAL FIBRILLATION N/A 07/20/2020    Procedure: Ablation atrial fibrillation;  Surgeon: Gabriel Hawley MD;  Location: [facility] EP LAB;  Service: Cardiology;  Laterality: N/A;  afib, PVI, RFA, REENA, SHADY, anes, MB, 3 Prep    ABLATION OF ARRHYTHMOGENIC FOCUS FOR ATRIAL FIBRILLATION N/A 01/24/2022    Procedure: Ablation atrial fibrillation;  Surgeon: Gabriel Hawley MD;  Location: [facility] EP LAB;  Service: Cardiology;  Laterality: N/A;  afib/afl, PVI (re-do)/CTI, RFA, REENA (cx if SR), SHADY, anes, MB, 3 Prep    APPLICATION OF WOUND VACUUM-ASSISTED CLOSURE DEVICE Left 08/03/2020    Procedure: APPLICATION, WOUND VAC;  Surgeon: SEMAJ Márquez III, MD;  Location: [facility] OR 2ND FLR;  Service: Peripheral Vascular;  Laterality: Left;    APPLICATION OF WOUND VACUUM-ASSISTED CLOSURE DEVICE Left 08/06/2020    Procedure: APPLICATION, WOUND VAC;  Surgeon: SEMAJ Márquez III, MD;  Location: [facility] OR 2ND FLR;  Service: Peripheral Vascular;  Laterality: Left;    CARPAL TUNNEL RELEASE Right 06/10/2020    Procedure: RELEASE, CARPAL TUNNEL - RIGHT;  Surgeon: Adelaida Hall MD;  Location: [facility] OR;  Service: Orthopedics;  Laterality: Right;  GENERAL AND REGIONAL    CARPAL TUNNEL RELEASE Left 05/05/2021    Procedure: RELEASE, CARPAL TUNNEL, LEFT;  Surgeon: Adelaida Hall MD;  Location: [facility] OR;  Service: Orthopedics;  Laterality: Left;  GENERAL & REGIONAL IN PACU    CARPECTOMY Left 09/06/2023    Procedure: CARPECTOMY;  Surgeon: Adelaida Hall MD;  Location: [facility] OR;  Service: Orthopedics;  Laterality: Left;   MAC/REGIONAL  LEFT PROXIMAL ROW    CLOSURE OF WOUND Left 08/06/2020    Procedure: CLOSURE, WOUND;  Surgeon: SEMAJ Márquez III, MD;  Location: [facility] OR [address] [place] [address];  Service: Peripheral Vascular;  Laterality: Left;  Complex    COLONOSCOPY N/A 08/17/2021    Procedure: COLONOSCOPY Suprep;  Surgeon: Loco Deluca MD;  Location: [facility] ENDO;  Service: Endoscopy;  Laterality: N/A;    ECHOCARDIOGRAM,TRANSESOPHAGEAL N/A 07/24/2023    Procedure: Transesophageal echo (REENA) intra-procedure log documentation;  Surgeon: Dosdane Diagnostic Provider;  Location: [facility] EP LAB;  Service: Cardiology;  Laterality: N/A;  s/p WM, REENA, anes, 3 Prep    ESOPHAGOGASTRODUODENOSCOPY N/A 08/17/2021    Procedure: EGD (ESOPHAGOGASTRODUODENOSCOPY);  Surgeon: Loco Deluca MD;  Location: [facility] ENDO;  Service: Endoscopy;  Laterality: N/A;  Patient is schedule to have her Covid test on 08/14/2021 at the [facility] @ 9:30am. AR.    EXPLORATION OF FEMORAL ARTERY Left 07/21/2020    Procedure: EXPLORATION, ARTERY, FEMORAL;  Surgeon: SEMAJ Márquez III, MD;  Location: [address];  Service: Peripheral Vascular;  Laterality: Left;  1. Urgent Direct repair, L SFA branch laceration    FOOT SURGERY      HYSTEROSCOPY WITH DILATION AND CURETTAGE OF UTERUS N/A 02/19/2022    Procedure: HYSTEROSCOPY, WITH DILATION AND CURETTAGE OF UTERUS;  Surgeon: Shane Palomo MD;  [facility];  Service: OB/GYN;  Laterality: N/A;    INCISION AND DRAINAGE OF KNEE Left 05/12/2022    Procedure: INCISION AND DRAINAGE, Prepatellar bursectomy.;  Surgeon: Dre Guzmán MD;  Location: [facility] OR [address] [place] [address];  Service: Orthopedics;  Laterality: Left;    LEFT HEART CATHETERIZATION Left 02/07/2023    Procedure: Left heart cath;  Surgeon: Josiah Terry MD;  Location: [facility] CATH LAB;  Service: Cardiology;  Laterality: Left;  borderline moderate bleeding risk 6.3%    OCCLUSION OF LEFT ATRIAL APPENDAGE N/A 06/12/2023    Procedure: Left atrial appendage  occlusion;  Surgeon: Gabriel Hawley MD;  Location: [facility] EP LAB;  Service: Cardiology;  Laterality: N/A;  afib, watchman, BSCI, demi, DESTINY ibarra, 3prep    RELEASE OF ULNAR NERVE AT CUBITAL TUNNEL Bilateral     RIGHT HEART CATHETERIZATION Right 08/05/2024    Procedure: INSERTION, CATHETER, RIGHT HEART;  Surgeon: Zane Liu MD;  Location: [facility] CATH LAB;  Service: Cardiology;  Laterality: Right;    ROBOT-ASSISTED LAPAROSCOPIC ABDOMINAL HYSTERECTOMY USING DA KEIRY XI N/A 08/09/2022    Procedure: XI ROBOTIC HYSTERECTOMY;  Surgeon: Yolanda Barriga MD;  Location: [facility] OR;  Service: OB/GYN;  Laterality: N/A;  dual console requested    ROBOT-ASSISTED LAPAROSCOPIC SALPINGO-OOPHORECTOMY Bilateral 08/09/2022    Procedure: ROBOTIC SALPINGO-OOPHORECTOMY;  Surgeon: Yolanda Barriga MD;  Location: [facility] OR;  Service: OB/GYN;  Laterality: Bilateral;    TRANSESOPHAGEAL ECHOCARDIOGRAPHY N/A 06/12/2023    Procedure: ECHOCARDIOGRAM, TRANSESOPHAGEAL;  Surgeon: Cyril Mast MD;  Location: [facility] EP LAB;  Service: Cardiology;  Laterality: N/A;    TREATMENT OF CARDIAC ARRHYTHMIA N/A 01/29/2020    Procedure: CARDIOVERSION;  Surgeon: Gabriel Hawley MD;  Location: [facility] EP LAB;  Service: Cardiology;  Laterality: N/A;  af, demi, dccv, destiny ibarra, 345    TREATMENT OF CARDIAC ARRHYTHMIA  01/24/2022    Procedure: Cardioversion or Defibrillation;  Surgeon: Gabriel Hawley MD;  Location: [facility] EP LAB;  Service: Cardiology;;    WOUND DEBRIDEMENT Left 08/06/2020    Procedure: DEBRIDEMENT, WOUND;  Surgeon: SEMAJ Márquez III, MD;  Location: [address] FLR;  Service: Peripheral Vascular;  Laterality: Left;       Time Tracking:     PT Received On: 09/13/24  PT Start Time: 0839     PT Stop Time: 0905  PT Total Time (min): 26 min     Billable Minutes: Evaluation 12 and Gait Training 14      09/13/2024

## 2024-09-13 NOTE — PT/OT/SLP EVAL
Occupational Therapy   Co-Evaluation/tx    Name: Vicky Alvarado  MRN: [id number]  Admitting Diagnosis: Cirrhosis of liver with ascites  Recent Surgery: * No surgery found *    Co-eval to accommodate for pt. Anticipated activity tolerance and to ensure pt. Safety due to medical complexity  Recommendations:     Discharge Recommendations: Low Intensity Therapy  Discharge Equipment Recommendations:  none  Barriers to discharge:  None    Assessment:     Vicky Alvarado is a 59 y.o. female with a medical diagnosis of Cirrhosis of liver with ascites.  She presents with deficits in self-care tasks and mobility. Pt. Recent d/c from rehab setting per pt. And was only home x 3 days. Pt. Tolerated evaluation well and would benefit from continued OT services. Patient would benefit from continued OT services to maximize level of safety and independence with self-care tasks.   . Performance deficits affecting function: weakness, impaired endurance, impaired self care skills, impaired functional mobility, gait instability, impaired balance, impaired cardiopulmonary response to activity.      Rehab Prognosis: Good; patient would benefit from acute skilled OT services to address these deficits and reach maximum level of function.       Plan:     Patient to be seen 4 x/week to address the above listed problems via self-care/home management, therapeutic activities, therapeutic exercises  Plan of Care Expires: 09/27/24  Plan of Care Reviewed with: patient    Subjective     Chief Complaint: wanting to get better  Patient/Family Comments/goals: To be able to transfer herself to commode    Occupational Profile:  Living Environment: Pt. Resides with sister and LEANNA and 2 adult nieces in  house with 1 step to enter. Pt. Has a tub shower with a chair  Previous level of function: Pt. Reported she just returned home from rehab setting x 3 days. Sister assisted with some ADLs like socks and shoes and bathing; on bad days she  required assist for transfers to chair  Roles and Routines: sister, aunt, partial caretaker of self, retired nurse from [employer]  Equipment Used at Home: walker, rolling, wheelchair, bedside commode, shower chair (scooter and lift chair)  Assistance upon Discharge: sister does work    Pain/Comfort:  Pain Rating 1: 0/10  Pain Rating Post-Intervention 1: 0/10    Patients cultural, spiritual, Sabianism conflicts given the current situation: no    Objective:     Communicated with: nurse prior to session.  Patient found supine with telemetry upon OT entry to room.    General Precautions: Standard, fall  Orthopedic Precautions: N/A  Braces: N/A  Respiratory Status: Room air    Occupational Performance:    Bed Mobility:    Patient completed Supine to Sit with supervision    Functional Mobility/Transfers:  Patient completed Sit <> Stand Transfer with minimum assistance  with  rolling walker   Functional Mobility: Pt. Ambulated with RW to bedside chair with Min A x 1     Activities of Daily Living:  Feeding:  set up A seated in chair for breakfast    Grooming: supervision seated EOB  Upper Body Dressing: minimum assistance to guide gown around back like robe seated EOB  Lower Body Dressing: stand by assistance to don left sock using figure 4 technique    Cognitive/Visual Perceptual:  Cognitive/Psychosocial Skills:     -       Oriented to: Person, Place, Time, and Situation   -       Follows Commands/attention:Follows multistep  commands  -       Communication: clear/fluent  -       Memory: No Deficits noted  -       Safety awareness/insight to disability: intact   -       Mood/Affect/Coping skills/emotional control: Appropriate to situation  Visual/Perceptual:      -wears glasses    Physical Exam:  Balance: -       sit: good; stand: min A  Postural examination/scapula alignment:    -       Rounded shoulders  -       Posterior pelvic tilt  Upper Extremity Range of Motion:     -       Right Upper Extremity: WFL  -       Left Upper  Extremity: WFL  Upper Extremity Strength:    -       Right Upper Extremity: WFL  -       Left Upper Extremity: WFL    AMPAC 6 Click ADL:  AMPAC Total Score: 20    Treatment & Education:  Pt. Educated on role of OT and pOC  Pt. Educated on need for staff assist for all mobility    Patient left up in chair with all lines intact and call button in reach    GOALS:   Multidisciplinary Problems       Occupational Therapy Goals          Problem: Occupational Therapy    Goal Priority Disciplines Outcome Interventions   Occupational Therapy Goal     OT, PT/OT Progressing    Description: Goals to be met by: 09-27-24     Patient will increase functional independence with ADLs by performing:    UE Dressing with Set-up Assistance.  LE Dressing with Set-up Assistance with AE as needed  Grooming while standing at sink with Modified [place].  Toileting from bedside commode with Modified Lucas for hygiene and clothing management.   Supine to sit with Lucas.  Step transfer with Modified [place]  Toilet transfer to bedside commode with Modified [place].  Pt. To be Independent with HEP to improve level of endurance                         History:     Past Medical History:   Diagnosis Date    Anticoagulant long-term use     Arthritis     Atrial fibrillation     cardioversion    Atrial fibrillation with RVR     HAS WATCHMAN IN PLACE NOW    Avascular necrosis     L hand    Cellulitis of extremity 05/07/2022    CHF (congestive heart failure)     Chronic fatigue     Cirrhosis of liver with ascites     Depression     Diabetes mellitus     Encounter for blood transfusion 07/22/2020    Encounter for blood transfusion 03/2022    Fatty liver     GERD (gastroesophageal reflux disease)     Hx of psychiatric care     Hypertension     Iron deficiency anemia 05/07/2022    TIBC 444 with saturated iron 8    Left leg cellulitis 05/07/2022    Liver disease     Obese     Pre-diabetes 05/07/2022    Psychiatric problem      Sleep apnea     wears cpap    SOB (shortness of breath) on exertion     Weight loss     75lb intentional weight loss         Past Surgical History:   Procedure Laterality Date    ABLATION OF ARRHYTHMOGENIC FOCUS FOR ATRIAL FIBRILLATION N/A 07/20/2020    Procedure: Ablation atrial fibrillation;  Surgeon: Gabriel Hawley MD;  Location: [facility] EP LAB;  Service: Cardiology;  Laterality: N/A;  afib, PVI, RFA, REENA, SHADY, anes, MB, 3 Prep    ABLATION OF ARRHYTHMOGENIC FOCUS FOR ATRIAL FIBRILLATION N/A 01/24/2022    Procedure: Ablation atrial fibrillation;  Surgeon: Gabriel Hawley MD;  Location: [facility] EP LAB;  Service: Cardiology;  Laterality: N/A;  afib/afl, PVI (re-do)/CTI, RFA, REENA (cx if SR), SHADY, anes, MB, 3 Prep    APPLICATION OF WOUND VACUUM-ASSISTED CLOSURE DEVICE Left 08/03/2020    Procedure: APPLICATION, WOUND VAC;  Surgeon: SEMAJ Márquez III, MD;  Location: [facility] OR 2ND FLR;  Service: Peripheral Vascular;  Laterality: Left;    APPLICATION OF WOUND VACUUM-ASSISTED CLOSURE DEVICE Left 08/06/2020    Procedure: APPLICATION, WOUND VAC;  Surgeon: SEMAJ Márquez III, MD;  Location: [facility] OR 2ND FLR;  Service: Peripheral Vascular;  Laterality: Left;    CARPAL TUNNEL RELEASE Right 06/10/2020    Procedure: RELEASE, CARPAL TUNNEL - RIGHT;  Surgeon: Adelaida Hall MD;  Location: [facility];  Service: Orthopedics;  Laterality: Right;  GENERAL AND REGIONAL    CARPAL TUNNEL RELEASE Left 05/05/2021    Procedure: RELEASE, CARPAL TUNNEL, LEFT;  Surgeon: Adelaida Hall MD;  Location: [facility] OR;  Service: Orthopedics;  Laterality: Left;  GENERAL & REGIONAL IN PACU    CARPECTOMY Left 09/06/2023    Procedure: CARPECTOMY;  Surgeon: Adelaida Hall MD;  Location: [facility] OR;  Service: Orthopedics;  Laterality: Left;  MAC/REGIONAL  LEFT PROXIMAL ROW    CLOSURE OF WOUND Left 08/06/2020    Procedure: CLOSURE, WOUND;  Surgeon: SEMAJ Márquez III, MD;  Location: [facility] OR 2ND FLR;  Service: Peripheral Vascular;  Laterality:  Left;  Complex    COLONOSCOPY N/A 08/17/2021    Procedure: COLONOSCOPY Suprep;  Surgeon: Loco Deluca MD;  Location: [facility];  Service: Endoscopy;  Laterality: N/A;    ECHOCARDIOGRAM,TRANSESOPHAGEAL N/A 07/24/2023    Procedure: Transesophageal echo (DEMI) intra-procedure log documentation;  Surgeon: Leyla Diagnostic Provider;  Location: [facility] EP LAB;  Service: Cardiology;  Laterality: N/A;  s/p WM, DEMI, anes, 3 Prep    ESOPHAGOGASTRODUODENOSCOPY N/A 08/17/2021    Procedure: EGD (ESOPHAGOGASTRODUODENOSCOPY);  Surgeon: Loco Deluca MD;  Location: [facility] ENDO;  Service: Endoscopy;  Laterality: N/A;  Patient is schedule to have her Covid test on 08/14/2021 at the [facility] @ 9:30am. [place].    EXPLORATION OF FEMORAL ARTERY Left 07/21/2020    Procedure: EXPLORATION, ARTERY, FEMORAL;  Surgeon: SEMAJ Márquez III, MD;  Location: [facility] OR [address] [place] [address];  Service: Peripheral Vascular;  Laterality: Left;  1. Urgent Direct repair, L SFA branch laceration    FOOT SURGERY      HYSTEROSCOPY WITH DILATION AND CURETTAGE OF UTERUS N/A 02/19/2022    Procedure: HYSTEROSCOPY, WITH DILATION AND CURETTAGE OF UTERUS;  Surgeon: Shane Palomo MD;  [facility];  Service: OB/GYN;  Laterality: N/A;    INCISION AND DRAINAGE OF KNEE Left 05/12/2022    Procedure: INCISION AND DRAINAGE, Prepatellar bursectomy.;  Surgeon: Dre Guzmán MD;  Location: [facility] OR [address] [place] [address];  Service: Orthopedics;  Laterality: Left;    LEFT HEART CATHETERIZATION Left 02/07/2023    Procedure: Left heart cath;  Surgeon: Josiah Terry MD;  Location: [facility] CATH LAB;  Service: Cardiology;  Laterality: Left;  borderline moderate bleeding risk 6.3%    OCCLUSION OF LEFT ATRIAL APPENDAGE N/A 06/12/2023    Procedure: Left atrial appendage occlusion;  Surgeon: Gabriel Hawley MD;  Location: [facility] EP LAB;  Service: Cardiology;  Laterality: N/A;  afib, watchman, BSCI, demi, anes, MB, 3prep    RELEASE OF ULNAR NERVE AT CUBITAL TUNNEL Bilateral      RIGHT HEART CATHETERIZATION Right 08/05/2024    Procedure: INSERTION, CATHETER, RIGHT HEART;  Surgeon: Zane Liu MD;  Location: [facility] CATH LAB;  Service: Cardiology;  Laterality: Right;    ROBOT-ASSISTED LAPAROSCOPIC ABDOMINAL HYSTERECTOMY USING DA KEIRY XI N/A 08/09/2022    Procedure: XI ROBOTIC HYSTERECTOMY;  Surgeon: Yolanda Barriga MD;  Location: [facility];  Service: OB/GYN;  Laterality: N/A;  dual console requested    ROBOT-ASSISTED LAPAROSCOPIC SALPINGO-OOPHORECTOMY Bilateral 08/09/2022    Procedure: ROBOTIC SALPINGO-OOPHORECTOMY;  Surgeon: Yolanda Barriga MD;  Location: [facility] OR;  Service: OB/GYN;  Laterality: Bilateral;    TRANSESOPHAGEAL ECHOCARDIOGRAPHY N/A 06/12/2023    Procedure: ECHOCARDIOGRAM, TRANSESOPHAGEAL;  Surgeon: Cyril Mast MD;  Location: [facility] EP LAB;  Service: Cardiology;  Laterality: N/A;    TREATMENT OF CARDIAC ARRHYTHMIA N/A 01/29/2020    Procedure: CARDIOVERSION;  Surgeon: Gabriel Hawley MD;  Location: [facility] EP LAB;  Service: Cardiology;  Laterality: N/A;  af, demi, dccv, anes, mb, 345    TREATMENT OF CARDIAC ARRHYTHMIA  01/24/2022    Procedure: Cardioversion or Defibrillation;  Surgeon: Gabriel Hawley MD;  Location: [facility] EP LAB;  Service: Cardiology;;    WOUND DEBRIDEMENT Left 08/06/2020    Procedure: DEBRIDEMENT, WOUND;  Surgeon: SEMAJ Márquez III, MD;  Location: [address];  Service: Peripheral Vascular;  Laterality: Left;       Time Tracking:     OT Date of Treatment: 09/13/24  OT Start Time: 0839  OT Stop Time: 0904  OT Total Time (min): 25 min    Billable Minutes:Evaluation 15  Self Care/Home Management 10    9/13/2024

## 2024-09-13 NOTE — PLAN OF CARE
Problem: Physical Therapy  Goal: Physical Therapy Goal  Description: Goals to be met by: 24     Patient will increase functional independence with mobility by performin. Supine to sit with [place]  2. Sit to supine with [place]  3. Sit to stand transfer with Stand-by Assistance  4. Bed to chair transfer with Stand-by Assistance using Rolling Walker  5. Gait  x 50 feet with Stand-by Assistance using Rolling Walker.   6. Ascend/Descend 4 inch curb step with Stand-by Assistance using Rolling Walker.  7. Lower extremity exercise program x15 reps per handout, with assistance as needed    PT Evaluation completed 2024   PT goals and POC established.     Outcome: Progressing

## 2024-09-13 NOTE — CONSULTS
[facility] - Telemetry Stepdown  Wound Care    Patient Name:  Vicky Alvarado   MRN:  [id number]  Date: 9/12/2024  Diagnosis: Cirrhosis of liver with ascites    Pt seen for wound consult- groin. Pt is AAOx3, states she has a rash to groin area. Pt is noted to wear disposable briefs, which may contribute to the wound. Wound is likely MASD/intertrigo; it is red with a few small areas of open skin. Pt had antifungal powder at bedside but suggested she try the antifungal cream/ointment. Pt agreeable. Pt also has bilateral lower arm skin tears from bloodwork/labs being drawn, per pt. Pt removed the old dressings and WOC applied skin friendly dressings. Education provided to pt on skin care including moisture skin damage and skin tears.    Recommendations:     1. Change dressings on bilateral lower arms every 5 days; gently remove old dressings, clean with NSS, cover with a non adherent dressing if wounds remain open. If they are closed, apply moisturizer but no dressing.    2. Clean groin/skin folds twice a day; use soap/water and dry well. Apply antifungal ointment/cream to all reddened areas. Avoid wearing briefs while in the hospital bed. Pt may go back to using antifungal powder per her preference.       EUSEBIA DowningN, RN, ON  [facility] [place] [facility]/Ostomy  9/12/24 09/12/24 1924        Wound 09/12/24 1515 Moisture associated dermatitis  Groin   Date First Assessed/Time First Assessed: 09/12/24 1515   Present on Original Admission: No  Primary Wound Type: Moisture associated dermatitis  Side: (c)   Location: Groin   Wound Image     Dressing Appearance Open to air   Drainage Amount None   Drainage Characteristics/Odor No odor   Appearance Pink;Red;Moist   Periwound Area Intact   Wound Edges Irregular   Care Cleansed with:;Sterile normal saline   Dressing Applied;Other (comment)  (antifungal powder)      09/12/24 1515   WOCN Assessment   WOCN Total Time (mins) 45   Visit Date 09/12/24   Visit Time 1515    Consult Type New   WOCN Speciality Wound   Wound moisture;skin tear   Number of Wounds 5   Intervention assessed;changed;applied;chart review;coordination of care   Teaching on-going        Wound 09/12/24 1515 Skin Tear Left anterior Arm   Date First Assessed/Time First Assessed: 09/12/24 1515   Present on Original Admission: No  Primary Wound Type: Skin Tear  Side: Left  Orientation: anterior  Location: Arm   Wound Image    Dressing Appearance Dry;Dried drainage   Drainage Amount Scant   Drainage Characteristics/Odor No odor;Serosanguineous   Appearance Pink;Red;White   Periwound Area Dry;Ecchymotic   Wound Edges Irregular   Care Cleansed with:;Sterile normal saline   Dressing Applied;Silver;Foam;Silicone        Wound 09/12/24 1515 Skin Tear Right anterior;lower Arm   Date First Assessed/Time First Assessed: 09/12/24 1515   Present on Original Admission: No  Primary Wound Type: Skin Tear  Side: Right  Orientation: anterior;lower  Location: Arm   Wound Image    Dressing Appearance Intact;Dried drainage   Drainage Characteristics/Odor Serosanguineous;No odor   Appearance Pink;Red;Moist   Periwound Area Ecchymotic;Dry   Wound Edges Irregular   Care Cleansed with:;Sterile normal saline   Dressing Applied;Silver;Foam;Silicone     History:       Past Medical History:   Diagnosis Date    Anticoagulant long-term use     Arthritis     Atrial fibrillation     cardioversion    Atrial fibrillation with RVR     HAS WATCHMAN IN PLACE NOW    Avascular necrosis     L hand    Cellulitis of extremity 05/07/2022    CHF (congestive heart failure)     Chronic fatigue     Cirrhosis of liver with ascites     Depression     Diabetes mellitus     Encounter for blood transfusion 07/22/2020    Encounter for blood transfusion 03/2022    Fatty liver     GERD (gastroesophageal reflux disease)     Hx of psychiatric care     Hypertension     Iron deficiency anemia 05/07/2022    TIBC 444 with saturated iron 8    Left leg cellulitis 05/07/2022     Liver disease     Obese     Pre-diabetes 2022    Psychiatric problem     Sleep apnea     wears cpap    SOB (shortness of breath) on exertion     Weight loss     75lb intentional weight loss       Social History     Socioeconomic History    Marital status:    Tobacco Use    Smoking status: Former     Current packs/day: 0.00     Types: Cigarettes     Quit date: 2019     Years since quittin.2    Smokeless tobacco: Never   Substance and Sexual Activity    Alcohol use: No    Drug use: No    Sexual activity: Not Currently     Partners: Male     Birth control/protection: See Surgical Hx     Social Determinants of Health     Financial Resource Strain: Low Risk  (2024)    Overall Financial Resource Strain (CARDIA)     Difficulty of Paying Living Expenses: Not very hard   Recent Concern: Financial Resource Strain - High Risk (2024)    Overall Financial Resource Strain (CARDIA)     Difficulty of Paying Living Expenses: Very hard   Food Insecurity: No Food Insecurity (2024)    Hunger Vital Sign     Worried About Running Out of Food in the Last Year: Never true     Ran Out of Food in the Last Year: Never true   Recent Concern: Food Insecurity - Food Insecurity Present (2024)    Hunger Vital Sign     Worried About Running Out of Food in the Last Year: Often true     Ran Out of Food in the Last Year: Sometimes true   Transportation Needs: Unmet Transportation Needs (2024)    TRANSPORTATION NEEDS     Transportation : Yes, it has kept me from medical appointments or from getting my medications.   Physical Activity: Inactive (2024)    Exercise Vital Sign     Days of Exercise per Week: 0 days     Minutes of Exercise per Session: 0 min   Stress: Stress Concern Present (2024)    Citizen of [place] [employer] - Occupational Stress Questionnaire     Feeling of Stress : To some extent   Housing Stability: Low Risk  (2024)    Housing Stability Vital Sign     Unable to  Pay for Housing in the Last Year: No     Homeless in the Last Year: No   Recent Concern: Housing Stability - High Risk (9/12/2024)    Housing Stability Vital Sign     Unable to Pay for Housing in the Last Year: Yes     Homeless in the Last Year: No       Precautions:     Allergies as of 09/10/2024 - Reviewed 09/10/2024   Allergen Reaction Noted    Flecainide Shortness Of Breath and Swelling 07/26/2021    Shellfish containing products Other (See Comments) 06/12/2023    Vancomycin analogues Hives, Itching, and Rash 05/07/2022       WOC Assessment Details/Treatment   See above
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
## 2024-09-13 NOTE — ASSESSMENT & PLAN NOTE
59F with history of AF, CAD, CKD, CHF, and cirrhosis likely multifactorial due to MASLD, congestive hepatopathy, and AD+ who presented for paracentesis. Patient has been having recurrence of large volume ascites requiring frequent paracenteses. Her etiology of liver cirrhosis is still unclear at this time; previous biopsy on 8/5 showed cirrhosis and predominantly congestive changes, though this was in the setting of acute CHF and anasarca. She underwent ericka with 10L removed. Patient had not been set up with regularly scheduled paracenteses so she reports recurrence of her symptoms of abdominal distention and SOB. Her hepatologist has now arranged for weekly paracenteses.    Recommendations:  - Recommend repeat TTE to evaluate for previously seen severe pulmonary hypertension (previous pulmonary artery systolic pressure was 54 mmHg on last echo)  - At this time, believe it is reasonable to defer TIPS to the outpatient setting and see if her ascites is better controlled with regularly scheduled ericka and titration of home diuretic regimen.  - Given her degree of sarcopenia, recommend Nutrition consultation.  - Consider repeat liver biopsy as outpatient, to be discussed further with her outpatient hepatologist.  - Patient deemed not to be a liver transplant candidate at this time given multiple co-morbidities and decreased functional status.

## 2024-09-13 NOTE — CONSULTS
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 Patient had Hepatology appointment on 9/10 with Dr. Saba and was found to be short of breath with tense ascites. She was directed to present to the ED for admission and paracentesis. She underwent LVP on 9/12 and had 6.5L removed with administration of 50g of albumin. She reports she has had several LVPs in the past month, 8/6, 8/13, and 8/23 with about 10L of ascitic fluid removed each time.  Patient reports having missed some outpatient paracentesis appointments due to confusion over scheduling; it seems she had already been established with Dr. Saba, but was set up for appointments with Dr. Massey after she was discharged from last hospitalization on 8/13. She is unsure which of her outpatient medications she should be taking. She denies any fevers, chills, abdominal pain, chest pain, or n/v/d. Hepatology consulted for assessment of TIPS in the setting of refractory, large volume ascites.    Review of Systems   Constitutional:  Negative for chills and fever.   HENT:  Negative for congestion and sore throat.    Eyes:  Negative for photophobia and visual disturbance.   Respiratory:  Positive for shortness of breath. Negative for cough.    Cardiovascular:  Negative for chest pain and  palpitations.   Gastrointestinal:  Positive for abdominal distention. Negative for abdominal pain and diarrhea.   Genitourinary:  Negative for dysuria and hematuria.   Musculoskeletal:  Negative for arthralgias and gait problem.   Skin:  Negative for rash and wound.   Neurological:  Negative for syncope and headaches.   Psychiatric/Behavioral:  Negative for agitation and behavioral problems.        Past Medical History:   Diagnosis Date    Anticoagulant long-term use     Arthritis     Atrial fibrillation     cardioversion    Atrial fibrillation with RVR     HAS WATCHMAN IN PLACE NOW    Avascular necrosis     L hand    Cellulitis of extremity 05/07/2022    CHF (congestive heart failure)     Chronic fatigue     Cirrhosis of liver with ascites     Depression     Diabetes mellitus     Encounter for blood transfusion 07/22/2020    Encounter for blood transfusion 03/2022    Fatty liver     GERD (gastroesophageal reflux disease)     Hx of psychiatric care     Hypertension     Iron deficiency anemia 05/07/2022    TIBC 444 with saturated iron 8    Left leg cellulitis 05/07/2022    Liver disease     Obese     Pre-diabetes 05/07/2022    Psychiatric problem     Sleep apnea     wears cpap    SOB (shortness of breath) on exertion     Weight loss     75lb intentional weight loss       Past Surgical History:   Procedure Laterality Date    ABLATION OF ARRHYTHMOGENIC FOCUS FOR ATRIAL FIBRILLATION N/A 07/20/2020    Procedure: Ablation atrial fibrillation;  Surgeon: Gabriel Hawley MD;  Location: [facility] EP LAB;  Service: Cardiology;  Laterality: N/A;  afib, PVI, RFA, REENA, SHADY, anes, MB, 3 Prep    ABLATION OF ARRHYTHMOGENIC FOCUS FOR ATRIAL FIBRILLATION N/A 01/24/2022    Procedure: Ablation atrial fibrillation;  Surgeon: Gabriel Hawley MD;  Location: [facility] EP LAB;  Service: Cardiology;  Laterality: N/A;  afib/afl, PVI (re-do)/CTI, RFA, REENA (cx if SR), SHADY, anes, MB, 3 Prep    APPLICATION OF WOUND VACUUM-ASSISTED CLOSURE DEVICE  Left 08/03/2020    Procedure: APPLICATION, WOUND VAC;  Surgeon: SEMAJ Márquez III, MD;  Location: [facility] OR 2ND FLR;  Service: Peripheral Vascular;  Laterality: Left;    APPLICATION OF WOUND VACUUM-ASSISTED CLOSURE DEVICE Left 08/06/2020    Procedure: APPLICATION, WOUND VAC;  Surgeon: SEMAJ Márquez III, MD;  Location: [facility] OR 2ND FLR;  Service: Peripheral Vascular;  Laterality: Left;    CARPAL TUNNEL RELEASE Right 06/10/2020    Procedure: RELEASE, CARPAL TUNNEL - RIGHT;  Surgeon: Adelaida Hall MD;  Location: [facility] [place] OR;  Service: Orthopedics;  Laterality: Right;  GENERAL AND REGIONAL    CARPAL TUNNEL RELEASE Left 05/05/2021    Procedure: RELEASE, CARPAL TUNNEL, LEFT;  Surgeon: Adelaida Hall MD;  Location: [facility] [place] OR;  Service: Orthopedics;  Laterality: Left;  GENERAL & REGIONAL IN PACU    CARPECTOMY Left 09/06/2023    Procedure: CARPECTOMY;  Surgeon: Adelaida Hall MD;  Location: [facility] [place] OR;  Service: Orthopedics;  Laterality: Left;  MAC/REGIONAL  LEFT PROXIMAL ROW    CLOSURE OF WOUND Left 08/06/2020    Procedure: CLOSURE, WOUND;  Surgeon: SEMAJ Márquez III, MD;  Location: [facility] OR 2ND FLR;  Service: Peripheral Vascular;  Laterality: Left;  Complex    COLONOSCOPY N/A 08/17/2021    Procedure: COLONOSCOPY Suprep;  Surgeon: Loco Deluca MD;  Location: [facility];  Service: Endoscopy;  Laterality: N/A;    ECHOCARDIOGRAM,TRANSESOPHAGEAL N/A 07/24/2023    Procedure: Transesophageal echo (REENA) intra-procedure log documentation;  Surgeon: [facility] Diagnostic Provider;  Location: [facility] EP LAB;  Service: Cardiology;  Laterality: N/A;  s/p WM, REENA, anes, 3 Prep    ESOPHAGOGASTRODUODENOSCOPY N/A 08/17/2021    Procedure: EGD (ESOPHAGOGASTRODUODENOSCOPY);  Surgeon: Loco Deluca MD;  Location: [facility];  Service: Endoscopy;  Laterality: N/A;  Patient is schedule to have her Covid test on 08/14/2021 at the [facility] @ 9:30am. [place].    EXPLORATION OF FEMORAL ARTERY Left 07/21/2020     Procedure: EXPLORATION, ARTERY, FEMORAL;  Surgeon: SEMAJ Márquez III, MD;  Location: [address];  Service: Peripheral Vascular;  Laterality: Left;  1. Urgent Direct repair, L SFA branch laceration    FOOT SURGERY      HYSTEROSCOPY WITH DILATION AND CURETTAGE OF UTERUS N/A 02/19/2022    Procedure: HYSTEROSCOPY, WITH DILATION AND CURETTAGE OF UTERUS;  Surgeon: Shane Palomo MD;  [facility];  Service: OB/GYN;  Laterality: N/A;    INCISION AND DRAINAGE OF KNEE Left 05/12/2022    Procedure: INCISION AND DRAINAGE, Prepatellar bursectomy.;  Surgeon: Dre Guzmán MD;  [address]  Service: Orthopedics;  Laterality: Left;    LEFT HEART CATHETERIZATION Left 02/07/2023    Procedure: Left heart cath;  Surgeon: Josiah Terry MD;  Location: [facility] CATH LAB;  Service: Cardiology;  Laterality: Left;  borderline moderate bleeding risk 6.3%    OCCLUSION OF LEFT ATRIAL APPENDAGE N/A 06/12/2023    Procedure: Left atrial appendage occlusion;  Surgeon: Gabriel Hawley MD;  Location: [facility] EP LAB;  Service: Cardiology;  Laterality: N/A;  afib, watchman, BSCI, demi, ALIYA ibarra, 3prep    RELEASE OF ULNAR NERVE AT CUBITAL TUNNEL Bilateral     RIGHT HEART CATHETERIZATION Right 08/05/2024    Procedure: INSERTION, CATHETER, RIGHT HEART;  Surgeon: Zane Liu MD;  Location: [facility] CATH LAB;  Service: Cardiology;  Laterality: Right;    ROBOT-ASSISTED LAPAROSCOPIC ABDOMINAL HYSTERECTOMY USING DA KEIRY XI N/A 08/09/2022    Procedure: XI ROBOTIC HYSTERECTOMY;  Surgeon: Yolanda Barriga MD;  Location: [facility];  Service: OB/GYN;  Laterality: N/A;  dual console requested    ROBOT-ASSISTED LAPAROSCOPIC SALPINGO-OOPHORECTOMY Bilateral 08/09/2022    Procedure: ROBOTIC SALPINGO-OOPHORECTOMY;  Surgeon: Yolanda Barriga MD;  Location: [facility];  Service: OB/GYN;  Laterality: Bilateral;    TRANSESOPHAGEAL ECHOCARDIOGRAPHY N/A 06/12/2023    Procedure: ECHOCARDIOGRAM, TRANSESOPHAGEAL;  Surgeon: Cyril Mast,  MD;  Location: [facility] EP LAB;  Service: Cardiology;  Laterality: N/A;    TREATMENT OF CARDIAC ARRHYTHMIA N/A 2020    Procedure: CARDIOVERSION;  Surgeon: Gabriel Hawley MD;  Location: [facility] EP LAB;  Service: Cardiology;  Laterality: N/A;  af, demi, dccv, anes, mb, 345    TREATMENT OF CARDIAC ARRHYTHMIA  2022    Procedure: Cardioversion or Defibrillation;  Surgeon: Gabriel Hawley MD;  Location: [facility] EP LAB;  Service: Cardiology;;    WOUND DEBRIDEMENT Left 2020    Procedure: DEBRIDEMENT, WOUND;  Surgeon: SEMAJ Márquez III, MD;  Location: [facility] OR [address] [place] [address];  Service: Peripheral Vascular;  Laterality: Left;       Family history of liver disease: No    Review of patient's allergies indicates:   Allergen Reactions    Flecainide Shortness Of Breath and Swelling    Shellfish containing products Other (See Comments)     Makes gout terrible    Vancomycin analogues Hives, Itching and Rash         Tobacco Use    Smoking status: Former     Current packs/day: 0.00     Types: Cigarettes     Quit date: 2019     Years since quittin.2    Smokeless tobacco: Never   Substance and Sexual Activity    Alcohol use: No    Drug use: No    Sexual activity: Not Currently     Partners: Male     Birth control/protection: See Surgical Hx       Medications Prior to Admission   Medication Sig Dispense Refill Last Dose    albuterol (VENTOLIN HFA) 90 mcg/actuation inhaler Inhale 2 puffs into the lungs every 6 (six) hours as needed for Wheezing. Rescue 18 g 6     ALPRAZolam (XANAX) 0.5 MG tablet Take 1 tablet (0.5 mg total) by mouth 2 (two) times daily as needed for Anxiety. 60 tablet 4     DULoxetine (CYMBALTA) 60 MG capsule Take 1 capsule (60 mg total) by mouth once daily. 180 capsule 3     lactulose (CHRONULAC) 10 gram/15 mL solution Take 15 mLs (10 g total) by mouth 2 (two) times daily. Please ensure you are having at least 2-3 bowel movements every day. 900 mL 2     lisinopriL (PRINIVIL,ZESTRIL) 40 MG tablet Take 1  tablet (40 mg total) by mouth once daily. 90 tablet 3     metoprolol succinate (TOPROL-XL) 100 MG 24 hr tablet Take 1 tablet (100 mg total) by mouth 2 (two) times daily. 180 tablet 3     multivitamin (THERAGRAN) per tablet Take 1 tablet by mouth once daily.       nystatin (MYCOSTATIN) powder Apply topically 2 (two) times daily. 60 g 1     omeprazole (PRILOSEC) 20 MG capsule TAKE 1 CAPSULE BY MOUTH ONCE DAILY 90 capsule 3     ondansetron (ZOFRAN-ODT) 4 MG TbDL Take 2 tablets (8 mg total) by mouth every 8 (eight) hours as needed (nasuea, vomiting). 30 tablet 1     b complex vitamins capsule Take 1 capsule by mouth every other day.       NIFEdipine (PROCARDIA-XL) 30 MG (OSM) 24 hr tablet Take 30 mg by mouth once daily.       [DISCONTINUED] NIFEdipine (PROCARDIA-XL) 30 MG (OSM) 24 hr tablet Take 1 tablet (30 mg total) by mouth once daily. 90 tablet 3        Objective:     Vital Signs (Most Recent):  Temp: 98.1 °F (36.7 °C) (09/13/24 0721)  Pulse: 95 (09/13/24 0721)  Resp: 14 (09/13/24 0721)  BP: (!) 87/58 (09/13/24 0721)  SpO2: (!) 94 % (09/13/24 0721) Vital Signs (24h Range):  Temp:  [96.3 °F (35.7 °C)-98.1 °F (36.7 °C)] 98.1 °F (36.7 °C)  Pulse:  [45-95] 95  Resp:  [14-20] 14  SpO2:  [93 %-100 %] 94 %  BP: ()/(50-70) 87/58     Weight: 81.6 kg (180 lb) (09/10/24 1521)  Body mass index is 31.89 kg/m².       Physical Exam  Vitals reviewed.   Constitutional:       General: She is not in acute distress.     Appearance: Normal appearance.   HENT:      Head: Normocephalic and atraumatic.      Nose: No congestion or rhinorrhea.      Mouth/Throat:      Mouth: Mucous membranes are moist.      Pharynx: Oropharynx is clear.   Eyes:      Extraocular Movements: Extraocular movements intact.      Pupils: Pupils are equal, round, and reactive to light.   Cardiovascular:      Rate and Rhythm: Normal rate and regular rhythm.      Pulses: Normal pulses.      Heart sounds: Normal heart sounds.   Pulmonary:      Effort: Pulmonary  effort is normal. No respiratory distress.      Breath sounds: Normal breath sounds.   Abdominal:      General: Bowel sounds are normal. There is distension.      Palpations: Abdomen is soft.      Tenderness: There is no abdominal tenderness.   Musculoskeletal:         General: No swelling or tenderness.      Comments: Significant muscle wasting noted in her extremities   Skin:     Findings: No bruising or rash.   Neurological:      General: No focal deficit present.      Mental Status: She is alert and oriented to person, place, and time.   Psychiatric:         Mood and Affect: Mood normal.         Behavior: Behavior normal.            MELD 3.0: 11 at 9/13/2024  4:21 AM  MELD-Na: 6 at 9/13/2024  4:21 AM  Calculated from:  Serum Creatinine: 0.9 mg/dL (Using min of 1 mg/dL) at 9/13/2024  4:21 AM  Serum Sodium: 135 mmol/L at 9/13/2024  4:21 AM  Total Bilirubin: 0.5 mg/dL (Using min of 1 mg/dL) at 9/13/2024  4:21 AM  Serum Albumin: 2.6 g/dL at 9/13/2024  4:21 AM  INR(ratio): 1.0 at 9/13/2024  4:21 AM  Age at listing (hypothetical): 59 years  Sex: Female at 9/13/2024  4:21 AM      Significant Labs:  Labs within the past month have been reviewed.    Significant Imaging:  Labs: Reviewed  Assessment/Plan:     GI  * Cirrhosis of liver with ascites  59F with history of AF, CAD, CKD, CHF, and cirrhosis likely multifactorial due to MASLD, congestive hepatopathy, and AD+ who presented for paracentesis. Patient has been having recurrence of large volume ascites requiring frequent paracenteses. Her etiology of liver cirrhosis is still unclear at this time; previous biopsy on 8/5 showed cirrhosis and predominantly congestive changes, though this was in the setting of acute CHF and anasarca. She underwent ericka with 10L removed. Patient had not been set up with regularly scheduled paracenteses so she reports recurrence of her symptoms of abdominal distention and SOB. Her hepatologist has now arranged for weekly paracenteses.  Underwent para on 9/12 with 6.5L removed, fluid studies not concerning for SBP.    Recommendations:  - Recommend repeat TTE to evaluate for previously seen severe pulmonary hypertension (previous pulmonary artery systolic pressure was 54 mmHg on last echo)  - At this time, believe it is reasonable to defer TIPS to the outpatient setting and see if her ascites is better controlled with regularly scheduled ericka and titration of home diuretic regimen.  - Given her degree of sarcopenia, recommend Nutrition consultation.  - Consider repeat liver biopsy as outpatient, to be discussed further with her outpatient hepatologist.  - Patient deemed not to be a liver transplant candidate at this time given multiple co-morbidities and decreased functional status.        Thank you for your consult. Please reach out with any questions or concerns.    Kain Hendrickson MD  Hepatology  [facility] - Telemetry Stepdown

## 2024-09-13 NOTE — NURSING
Nurses Note -- 4 Eyes      9/12/2024  7:00 PM      Skin assessed during: Q Shift Change      [] No Altered Skin Integrity Present    []Prevention Measures Documented      [x] Yes- Altered Skin Integrity Present or Discovered   [] LDA Added if Not in Epic (Describe Wound)   [] New Altered Skin Integrity was Present on Admit and Documented in LDA   [] Wound Image Taken    Wound Care Consulted? Yes    Attending Nurse:  WILBERT Jeffery    Second RN/Staff Member:  WILBERT Weiss

## 2024-09-13 NOTE — SUBJECTIVE & OBJECTIVE
Interval History:     Ordering repeat ECHO to assess PASP. Appreciate hepatology recommendations.     Review of Systems   Constitutional:  Negative for chills and fever.   Gastrointestinal:  Negative for nausea and vomiting.   Neurological:  Negative for seizures and syncope.     Objective:     Vital Signs (Most Recent):  Temp: 97.5 °F (36.4 °C) (09/13/24 1515)  Pulse: 61 (09/13/24 1515)  Resp: 20 (09/13/24 1515)  BP: 107/66 (09/13/24 1515)  SpO2: 99 % (09/13/24 1515) Vital Signs (24h Range):  Temp:  [97.1 °F (36.2 °C)-98.1 °F (36.7 °C)] 97.5 °F (36.4 °C)  Pulse:  [59-95] 61  Resp:  [14-20] 20  SpO2:  [93 %-99 %] 99 %  BP: ()/(52-68) 107/66     Weight: 81.6 kg (179 lb 14.3 oz)  Body mass index is 31.87 kg/m².    Intake/Output Summary (Last 24 hours) at 9/13/2024 1832  Last data filed at 9/12/2024 2030  Gross per 24 hour   Intake 200 ml   Output 8 ml   Net 192 ml         Physical Exam  Vitals and nursing note reviewed.   Constitutional:       General: She is not in acute distress.     Appearance: She is well-developed. She is obese. She is not diaphoretic.      Interventions: She is not intubated.  Eyes:      Extraocular Movements: Extraocular movements intact.   Cardiovascular:      Rate and Rhythm: Tachycardia present. Rhythm irregular.   Pulmonary:      Effort: Pulmonary effort is normal. No accessory muscle usage or respiratory distress. She is not intubated.   Abdominal:      General: There is distension.   Skin:     Capillary Refill: Capillary refill takes less than 2 seconds.   Neurological:      Mental Status: She is alert and oriented to person, place, and time. Mental status is at baseline.      Motor: No seizure activity.   Psychiatric:         Attention and Perception: Attention normal.         Mood and Affect: Mood and affect normal.         Behavior: Behavior is cooperative.             Significant Labs: All pertinent labs within the past 24 hours have been reviewed.    Recent Labs   Lab  09/13/24  0421   CALCIUM 8.9   ALBUMIN 2.6*   PROT 5.7*   *   K 3.9   CO2 17*      BUN 25*   CREATININE 0.9   ALKPHOS 215*   ALT 5*   AST 20   BILITOT 0.5       Recent Labs   Lab 09/13/24 0421   WBC 5.48   RBC 2.94*   HGB 8.3*   HCT 27.0*      MCV 92   MCH 28.2   MCHC 30.7*         Significant Imaging: I have reviewed all pertinent imaging results/findings within the past 24 hours.

## 2024-09-13 NOTE — ASSESSMENT & PLAN NOTE
Lower than previous. Not prescribed iron supplement. Started ferrous sulfate. Similar levels as August. Will consider IV infusion

## 2024-09-13 NOTE — ASSESSMENT & PLAN NOTE
She reports her blood pressures to be chronically relatively low.  Chronic, controlled. Latest blood pressure and vitals reviewed-     Temp:  [96.3 °F (35.7 °C)-98 °F (36.7 °C)]   Pulse:  [45-79]   Resp:  [17-20]   BP: ()/(50-70)   SpO2:  [93 %-100 %] .   Home meds for hypertension were reviewed and noted below.   Hypertension Medications               lisinopriL (PRINIVIL,ZESTRIL) 40 MG tablet Take 1 tablet (40 mg total) by mouth once daily.    metoprolol succinate (TOPROL-XL) 100 MG 24 hr tablet Take 1 tablet (100 mg total) by mouth 2 (two) times daily.    NIFEdipine (PROCARDIA-XL) 30 MG (OSM) 24 hr tablet Take 30 mg by mouth once daily.            While in the hospital, will manage blood pressure as follows; Adjust home antihypertensive regimen as follows- hold nifedipine and lisinopril.    Will utilize p.r.n. blood pressure medication only if patient's blood pressure greater than 180/110 and she develops symptoms such as worsening chest pain or shortness of breath.

## 2024-09-13 NOTE — TELEPHONE ENCOUNTER
Spoke with pt's sister.  [place] [facility]  [place] to Dr Massey.

## 2024-09-13 NOTE — SUBJECTIVE & OBJECTIVE
Review of Systems   Constitutional:  Negative for chills and fever.   HENT:  Negative for congestion and sore throat.    Eyes:  Negative for photophobia and visual disturbance.   Respiratory:  Positive for shortness of breath. Negative for cough.    Cardiovascular:  Negative for chest pain and palpitations.   Gastrointestinal:  Positive for abdominal distention. Negative for abdominal pain and diarrhea.   Genitourinary:  Negative for dysuria and hematuria.   Musculoskeletal:  Negative for arthralgias and gait problem.   Skin:  Negative for rash and wound.   Neurological:  Negative for syncope and headaches.   Psychiatric/Behavioral:  Negative for agitation and behavioral problems.        Past Medical History:   Diagnosis Date    Anticoagulant long-term use     Arthritis     Atrial fibrillation     cardioversion    Atrial fibrillation with RVR     HAS WATCHMAN IN PLACE NOW    Avascular necrosis     L hand    Cellulitis of extremity 05/07/2022    CHF (congestive heart failure)     Chronic fatigue     Cirrhosis of liver with ascites     Depression     Diabetes mellitus     Encounter for blood transfusion 07/22/2020    Encounter for blood transfusion 03/2022    Fatty liver     GERD (gastroesophageal reflux disease)     Hx of psychiatric care     Hypertension     Iron deficiency anemia 05/07/2022    TIBC 444 with saturated iron 8    Left leg cellulitis 05/07/2022    Liver disease     Obese     Pre-diabetes 05/07/2022    Psychiatric problem     Sleep apnea     wears cpap    SOB (shortness of breath) on exertion     Weight loss     75lb intentional weight loss       Past Surgical History:   Procedure Laterality Date    ABLATION OF ARRHYTHMOGENIC FOCUS FOR ATRIAL FIBRILLATION N/A 07/20/2020    Procedure: Ablation atrial fibrillation;  Surgeon: Gabriel Hawley MD;  Location: [facility] EP LAB;  Service: Cardiology;  Laterality: N/A;  afib, PVI, RFA, REENA, SHADY, anes, MB, 3 Prep    ABLATION OF ARRHYTHMOGENIC FOCUS FOR ATRIAL  FIBRILLATION N/A 01/24/2022    Procedure: Ablation atrial fibrillation;  Surgeon: Gabriel Hawley MD;  Location: [facility] EP LAB;  Service: Cardiology;  Laterality: N/A;  afib/afl, PVI (re-do)/CTI, RFA, REENA (cx if SR), SHADY, anes, MB, 3 Prep    APPLICATION OF WOUND VACUUM-ASSISTED CLOSURE DEVICE Left 08/03/2020    Procedure: APPLICATION, WOUND VAC;  Surgeon: SEMAJ Márquez III, MD;  Location: [facility] OR 2ND FLR;  Service: Peripheral Vascular;  Laterality: Left;    APPLICATION OF WOUND VACUUM-ASSISTED CLOSURE DEVICE Left 08/06/2020    Procedure: APPLICATION, WOUND VAC;  Surgeon: SEMAJ Márquez III, MD;  Location: [facility] OR 2ND FLR;  Service: Peripheral Vascular;  Laterality: Left;    CARPAL TUNNEL RELEASE Right 06/10/2020    Procedure: RELEASE, CARPAL TUNNEL - RIGHT;  Surgeon: Adelaida Hall MD;  Location: [facility] OR;  Service: Orthopedics;  Laterality: Right;  GENERAL AND REGIONAL    CARPAL TUNNEL RELEASE Left 05/05/2021    Procedure: RELEASE, CARPAL TUNNEL, LEFT;  Surgeon: Adelaida Hall MD;  Location: [facility] OR;  Service: Orthopedics;  Laterality: Left;  GENERAL & REGIONAL IN PACU    CARPECTOMY Left 09/06/2023    Procedure: CARPECTOMY;  Surgeon: Adelaida Hall MD;  Location: [facility] OR;  Service: Orthopedics;  Laterality: Left;  MAC/REGIONAL  LEFT PROXIMAL ROW    CLOSURE OF WOUND Left 08/06/2020    Procedure: CLOSURE, WOUND;  Surgeon: SEMAJ Márquez III, MD;  Location: [facility] OR 2ND FLR;  Service: Peripheral Vascular;  Laterality: Left;  Complex    COLONOSCOPY N/A 08/17/2021    Procedure: COLONOSCOPY Suprep;  Surgeon: Loco Deluca MD;  Location: [facility] ENDO;  Service: Endoscopy;  Laterality: N/A;    ECHOCARDIOGRAM,TRANSESOPHAGEAL N/A 07/24/2023    Procedure: Transesophageal echo (REENA) intra-procedure log documentation;  Surgeon: [facility] Diagnostic Provider;  Location: [facility] EP LAB;  Service: Cardiology;  Laterality: N/A;  s/p WM, REENA, anes, 3 Prep    ESOPHAGOGASTRODUODENOSCOPY N/A 08/17/2021     Procedure: EGD (ESOPHAGOGASTRODUODENOSCOPY);  Surgeon: Loco Deluca MD;  Location: [facility] ENDO;  Service: Endoscopy;  Laterality: N/A;  Patient is schedule to have her Covid test on 08/14/2021 at the [facility] @ 9:30am. [place].    EXPLORATION OF FEMORAL ARTERY Left 07/21/2020    Procedure: EXPLORATION, ARTERY, FEMORAL;  Surgeon: SEMAJ Márquez III, MD;  Location: [address];  Service: Peripheral Vascular;  Laterality: Left;  1. Urgent Direct repair, L SFA branch laceration    FOOT SURGERY      HYSTEROSCOPY WITH DILATION AND CURETTAGE OF UTERUS N/A 02/19/2022    Procedure: HYSTEROSCOPY, WITH DILATION AND CURETTAGE OF UTERUS;  Surgeon: Shane Palomo MD;  Location: [address];  Service: OB/GYN;  Laterality: N/A;    INCISION AND DRAINAGE OF KNEE Left 05/12/2022    Procedure: INCISION AND DRAINAGE, Prepatellar bursectomy.;  Surgeon: Dre Guzmán MD;  Location: [address];  Service: Orthopedics;  Laterality: Left;    LEFT HEART CATHETERIZATION Left 02/07/2023    Procedure: Left heart cath;  Surgeon: Josiah Terry MD;  Location: [facility] CATH LAB;  Service: Cardiology;  Laterality: Left;  borderline moderate bleeding risk 6.3%    OCCLUSION OF LEFT ATRIAL APPENDAGE N/A 06/12/2023    Procedure: Left atrial appendage occlusion;  Surgeon: Gabriel Hawley MD;  Location: [facility] EP LAB;  Service: Cardiology;  Laterality: N/A;  afib, watchman, BSCI, demi, anes, MB, 3prep    RELEASE OF ULNAR NERVE AT CUBITAL TUNNEL Bilateral     RIGHT HEART CATHETERIZATION Right 08/05/2024    Procedure: INSERTION, CATHETER, RIGHT HEART;  Surgeon: Zane Liu MD;  Location: [facility] CATH LAB;  Service: Cardiology;  Laterality: Right;    ROBOT-ASSISTED LAPAROSCOPIC ABDOMINAL HYSTERECTOMY USING DA KEIRY XI N/A 08/09/2022    Procedure: XI ROBOTIC HYSTERECTOMY;  Surgeon: Yolanda Barriga MD;  Location: [facility];  Service: OB/GYN;  Laterality: N/A;  dual console requested    ROBOT-ASSISTED LAPAROSCOPIC  SALPINGO-OOPHORECTOMY Bilateral 2022    Procedure: ROBOTIC SALPINGO-OOPHORECTOMY;  Surgeon: Yolanda Barriga MD;  Location: [facility];  Service: OB/GYN;  Laterality: Bilateral;    TRANSESOPHAGEAL ECHOCARDIOGRAPHY N/A 2023    Procedure: ECHOCARDIOGRAM, TRANSESOPHAGEAL;  Surgeon: Cyril Mast MD;  Location: [facility] EP LAB;  Service: Cardiology;  Laterality: N/A;    TREATMENT OF CARDIAC ARRHYTHMIA N/A 2020    Procedure: CARDIOVERSION;  Surgeon: Gabriel Hawley MD;  Location: [facility] EP LAB;  Service: Cardiology;  Laterality: N/A;  af, demi, dccv, anes, mb, 345    TREATMENT OF CARDIAC ARRHYTHMIA  2022    Procedure: Cardioversion or Defibrillation;  Surgeon: Gabriel Hawley MD;  Location: [facility] EP LAB;  Service: Cardiology;;    WOUND DEBRIDEMENT Left 2020    Procedure: DEBRIDEMENT, WOUND;  Surgeon: SEMAJ Márquez III, MD;  Location: [address];  Service: Peripheral Vascular;  Laterality: Left;       Family history of liver disease: No    Review of patient's allergies indicates:   Allergen Reactions    Flecainide Shortness Of Breath and Swelling    Shellfish containing products Other (See Comments)     Makes gout terrible    Vancomycin analogues Hives, Itching and Rash         Tobacco Use    Smoking status: Former     Current packs/day: 0.00     Types: Cigarettes     Quit date: 2019     Years since quittin.2    Smokeless tobacco: Never   Substance and Sexual Activity    Alcohol use: No    Drug use: No    Sexual activity: Not Currently     Partners: Male     Birth control/protection: See Surgical Hx       Medications Prior to Admission   Medication Sig Dispense Refill Last Dose    albuterol (VENTOLIN HFA) 90 mcg/actuation inhaler Inhale 2 puffs into the lungs every 6 (six) hours as needed for Wheezing. Rescue 18 g 6     ALPRAZolam (XANAX) 0.5 MG tablet Take 1 tablet (0.5 mg total) by mouth 2 (two) times daily as needed for Anxiety. 60 tablet 4     DULoxetine (CYMBALTA) 60  MG capsule Take 1 capsule (60 mg total) by mouth once daily. 180 capsule 3     lactulose (CHRONULAC) 10 gram/15 mL solution Take 15 mLs (10 g total) by mouth 2 (two) times daily. Please ensure you are having at least 2-3 bowel movements every day. 900 mL 2     lisinopriL (PRINIVIL,ZESTRIL) 40 MG tablet Take 1 tablet (40 mg total) by mouth once daily. 90 tablet 3     metoprolol succinate (TOPROL-XL) 100 MG 24 hr tablet Take 1 tablet (100 mg total) by mouth 2 (two) times daily. 180 tablet 3     multivitamin (THERAGRAN) per tablet Take 1 tablet by mouth once daily.       nystatin (MYCOSTATIN) powder Apply topically 2 (two) times daily. 60 g 1     omeprazole (PRILOSEC) 20 MG capsule TAKE 1 CAPSULE BY MOUTH ONCE DAILY 90 capsule 3     ondansetron (ZOFRAN-ODT) 4 MG TbDL Take 2 tablets (8 mg total) by mouth every 8 (eight) hours as needed (nasuea, vomiting). 30 tablet 1     b complex vitamins capsule Take 1 capsule by mouth every other day.       NIFEdipine (PROCARDIA-XL) 30 MG (OSM) 24 hr tablet Take 30 mg by mouth once daily.       [DISCONTINUED] NIFEdipine (PROCARDIA-XL) 30 MG (OSM) 24 hr tablet Take 1 tablet (30 mg total) by mouth once daily. 90 tablet 3        Objective:     Vital Signs (Most Recent):  Temp: 98.1 °F (36.7 °C) (09/13/24 0721)  Pulse: 95 (09/13/24 0721)  Resp: 14 (09/13/24 0721)  BP: (!) 87/58 (09/13/24 0721)  SpO2: (!) 94 % (09/13/24 0721) Vital Signs (24h Range):  Temp:  [96.3 °F (35.7 °C)-98.1 °F (36.7 °C)] 98.1 °F (36.7 °C)  Pulse:  [45-95] 95  Resp:  [14-20] 14  SpO2:  [93 %-100 %] 94 %  BP: ()/(50-70) 87/58     Weight: 81.6 kg (180 lb) (09/10/24 1521)  Body mass index is 31.89 kg/m².       Physical Exam  Vitals reviewed.   Constitutional:       General: She is not in acute distress.     Appearance: Normal appearance.   HENT:      Head: Normocephalic and atraumatic.      Nose: No congestion or rhinorrhea.      Mouth/Throat:      Mouth: Mucous membranes are moist.      Pharynx: Oropharynx is  clear.   Eyes:      Extraocular Movements: Extraocular movements intact.      Pupils: Pupils are equal, round, and reactive to light.   Cardiovascular:      Rate and Rhythm: Normal rate and regular rhythm.      Pulses: Normal pulses.      Heart sounds: Normal heart sounds.   Pulmonary:      Effort: Pulmonary effort is normal. No respiratory distress.      Breath sounds: Normal breath sounds.   Abdominal:      General: Bowel sounds are normal. There is distension.      Palpations: Abdomen is soft.      Tenderness: There is no abdominal tenderness.   Musculoskeletal:         General: No swelling or tenderness.      Comments: Significant muscle wasting noted in her extremities   Skin:     Findings: No bruising or rash.   Neurological:      General: No focal deficit present.      Mental Status: She is alert and oriented to person, place, and time.   Psychiatric:         Mood and Affect: Mood normal.         Behavior: Behavior normal.            MELD 3.0: 11 at 9/13/2024  4:21 AM  MELD-Na: 6 at 9/13/2024  4:21 AM  Calculated from:  Serum Creatinine: 0.9 mg/dL (Using min of 1 mg/dL) at 9/13/2024  4:21 AM  Serum Sodium: 135 mmol/L at 9/13/2024  4:21 AM  Total Bilirubin: 0.5 mg/dL (Using min of 1 mg/dL) at 9/13/2024  4:21 AM  Serum Albumin: 2.6 g/dL at 9/13/2024  4:21 AM  INR(ratio): 1.0 at 9/13/2024  4:21 AM  Age at listing (hypothetical): 59 years  Sex: Female at 9/13/2024  4:21 AM      Significant Labs:  Labs within the past month have been reviewed.    Significant Imaging:  Labs: Reviewed

## 2024-09-13 NOTE — PLAN OF CARE
Problem: Occupational Therapy  Goal: Occupational Therapy Goal  Description: Goals to be met by: 09-27-24     Patient will increase functional independence with ADLs by performing:    UE Dressing with Set-up Assistance.  LE Dressing with Set-up Assistance with AE as needed  Grooming while standing at sink with Modified [place].  Toileting from bedside commode with Modified [place] for hygiene and clothing management.   Supine to sit with [place].  Step transfer with Modified [place]  Toilet transfer to bedside commode with Modified [place].  Pt. To be Independent with HEP to improve level of endurance    Outcome: Progressing

## 2024-09-13 NOTE — TELEPHONE ENCOUNTER
----- Message from Laurie Massey MD sent at 9/13/2024  8:50 AM CDT -----  Please get PT INR as well. Let me know what she has to say about any bleeding.

## 2024-09-13 NOTE — ASSESSMENT & PLAN NOTE
Hyponatremia is likely due to Cirrhosis. The patient's most recent sodium results are listed below.  Recent Labs     09/10/24  1531 09/11/24  0458 09/12/24  0512    139 135*     Plan  - Correct the sodium by 4-6mEq in 24 hours.   - Will treat the hyponatremia with Fluid restriction of:  1.5 liter per day  - Monitor sodium Daily.   - Patient hyponatremia is stable

## 2024-09-14 LAB
ALBUMIN SERPL BCP-MCNC: 2.5 G/DL (ref 3.5–5.2)
ALP SERPL-CCNC: 208 U/L (ref 55–135)
ALT SERPL W/O P-5'-P-CCNC: 7 U/L (ref 10–44)
ANION GAP SERPL CALC-SCNC: 8 MMOL/L (ref 8–16)
AST SERPL-CCNC: 18 U/L (ref 10–40)
BASOPHILS # BLD AUTO: 0.06 K/UL (ref 0–0.2)
BASOPHILS NFR BLD: 1.2 % (ref 0–1.9)
BILIRUB SERPL-MCNC: 0.3 MG/DL (ref 0.1–1)
BUN SERPL-MCNC: 20 MG/DL (ref 6–20)
CALCIUM SERPL-MCNC: 8.9 MG/DL (ref 8.7–10.5)
CHLORIDE SERPL-SCNC: 109 MMOL/L (ref 95–110)
CO2 SERPL-SCNC: 18 MMOL/L (ref 23–29)
CREAT SERPL-MCNC: 0.9 MG/DL (ref 0.5–1.4)
DIFFERENTIAL METHOD BLD: ABNORMAL
EOSINOPHIL # BLD AUTO: 0.2 K/UL (ref 0–0.5)
EOSINOPHIL NFR BLD: 3.7 % (ref 0–8)
ERYTHROCYTE [DISTWIDTH] IN BLOOD BY AUTOMATED COUNT: 17 % (ref 11.5–14.5)
EST. GFR  (NO RACE VARIABLE): >60 ML/MIN/1.73 M^2
GLUCOSE SERPL-MCNC: 129 MG/DL (ref 70–110)
HCT VFR BLD AUTO: 25.8 % (ref 37–48.5)
HGB BLD-MCNC: 8 G/DL (ref 12–16)
IMM GRANULOCYTES # BLD AUTO: 0.02 K/UL (ref 0–0.04)
IMM GRANULOCYTES NFR BLD AUTO: 0.4 % (ref 0–0.5)
INR PPP: 1 (ref 0.8–1.2)
LYMPHOCYTES # BLD AUTO: 1 K/UL (ref 1–4.8)
LYMPHOCYTES NFR BLD: 19.3 % (ref 18–48)
MAGNESIUM SERPL-MCNC: 1.6 MG/DL (ref 1.6–2.6)
MCH RBC QN AUTO: 28.3 PG (ref 27–31)
MCHC RBC AUTO-ENTMCNC: 31 G/DL (ref 32–36)
MCV RBC AUTO: 91 FL (ref 82–98)
MONOCYTES # BLD AUTO: 0.7 K/UL (ref 0.3–1)
MONOCYTES NFR BLD: 14.1 % (ref 4–15)
NEUTROPHILS # BLD AUTO: 3.2 K/UL (ref 1.8–7.7)
NEUTROPHILS NFR BLD: 61.3 % (ref 38–73)
NRBC BLD-RTO: 0 /100 WBC
PHOSPHATE SERPL-MCNC: 3 MG/DL (ref 2.7–4.5)
PLATELET # BLD AUTO: 228 K/UL (ref 150–450)
PMV BLD AUTO: 10 FL (ref 9.2–12.9)
POTASSIUM SERPL-SCNC: 3.6 MMOL/L (ref 3.5–5.1)
PROT SERPL-MCNC: 5.6 G/DL (ref 6–8.4)
PROTHROMBIN TIME: 10.9 SEC (ref 9–12.5)
RBC # BLD AUTO: 2.83 M/UL (ref 4–5.4)
SODIUM SERPL-SCNC: 135 MMOL/L (ref 136–145)
WBC # BLD AUTO: 5.18 K/UL (ref 3.9–12.7)

## 2024-09-14 PROCEDURE — 83735 ASSAY OF MAGNESIUM: CPT | Performed by: STUDENT IN AN ORGANIZED HEALTH CARE EDUCATION/TRAINING PROGRAM

## 2024-09-14 PROCEDURE — 25000003 PHARM REV CODE 250: Performed by: STUDENT IN AN ORGANIZED HEALTH CARE EDUCATION/TRAINING PROGRAM

## 2024-09-14 PROCEDURE — 20600001 HC STEP DOWN PRIVATE ROOM

## 2024-09-14 PROCEDURE — 99900035 HC TECH TIME PER 15 MIN (STAT)

## 2024-09-14 PROCEDURE — 94660 CPAP INITIATION&MGMT: CPT

## 2024-09-14 PROCEDURE — 94761 N-INVAS EAR/PLS OXIMETRY MLT: CPT

## 2024-09-14 PROCEDURE — 85610 PROTHROMBIN TIME: CPT | Performed by: STUDENT IN AN ORGANIZED HEALTH CARE EDUCATION/TRAINING PROGRAM

## 2024-09-14 PROCEDURE — 84100 ASSAY OF PHOSPHORUS: CPT | Performed by: STUDENT IN AN ORGANIZED HEALTH CARE EDUCATION/TRAINING PROGRAM

## 2024-09-14 PROCEDURE — 97535 SELF CARE MNGMENT TRAINING: CPT | Mod: CO

## 2024-09-14 PROCEDURE — 85025 COMPLETE CBC W/AUTO DIFF WBC: CPT | Performed by: STUDENT IN AN ORGANIZED HEALTH CARE EDUCATION/TRAINING PROGRAM

## 2024-09-14 PROCEDURE — 27100171 HC OXYGEN HIGH FLOW UP TO 24 HOURS

## 2024-09-14 PROCEDURE — 97530 THERAPEUTIC ACTIVITIES: CPT | Mod: CO

## 2024-09-14 PROCEDURE — 25000003 PHARM REV CODE 250: Performed by: HOSPITALIST

## 2024-09-14 PROCEDURE — 36415 COLL VENOUS BLD VENIPUNCTURE: CPT | Performed by: STUDENT IN AN ORGANIZED HEALTH CARE EDUCATION/TRAINING PROGRAM

## 2024-09-14 PROCEDURE — 80053 COMPREHEN METABOLIC PANEL: CPT | Performed by: HOSPITALIST

## 2024-09-14 RX ADMIN — MIDODRINE HYDROCHLORIDE 10 MG: 5 TABLET ORAL at 08:09

## 2024-09-14 RX ADMIN — METOPROLOL SUCCINATE 100 MG: 100 TABLET, EXTENDED RELEASE ORAL at 08:09

## 2024-09-14 RX ADMIN — OXYCODONE 5 MG: 5 TABLET ORAL at 04:09

## 2024-09-14 RX ADMIN — MIDODRINE HYDROCHLORIDE 10 MG: 5 TABLET ORAL at 04:09

## 2024-09-14 RX ADMIN — MICONAZOLE NITRATE 2 % TOPICAL POWDER: at 08:09

## 2024-09-14 RX ADMIN — ALPRAZOLAM 0.5 MG: 0.5 TABLET ORAL at 08:09

## 2024-09-14 RX ADMIN — ONDANSETRON 8 MG: 8 TABLET, ORALLY DISINTEGRATING ORAL at 05:09

## 2024-09-14 RX ADMIN — METHOCARBAMOL 500 MG: 500 TABLET ORAL at 05:09

## 2024-09-14 RX ADMIN — OXYCODONE 5 MG: 5 TABLET ORAL at 09:09

## 2024-09-14 RX ADMIN — FUROSEMIDE 20 MG: 20 TABLET ORAL at 08:09

## 2024-09-14 RX ADMIN — TRAZODONE HYDROCHLORIDE 100 MG: 100 TABLET ORAL at 09:09

## 2024-09-14 RX ADMIN — FERROUS SULFATE TAB EC 325 MG (65 MG FE EQUIVALENT) 1 EACH: 325 (65 FE) TABLET DELAYED RESPONSE at 08:09

## 2024-09-14 RX ADMIN — METHOCARBAMOL 500 MG: 500 TABLET ORAL at 09:09

## 2024-09-14 RX ADMIN — OXYCODONE 5 MG: 5 TABLET ORAL at 08:09

## 2024-09-14 RX ADMIN — SPIRONOLACTONE 25 MG: 25 TABLET ORAL at 08:09

## 2024-09-14 RX ADMIN — MIDODRINE HYDROCHLORIDE 10 MG: 5 TABLET ORAL at 09:09

## 2024-09-14 RX ADMIN — DULOXETINE HYDROCHLORIDE 60 MG: 60 CAPSULE, DELAYED RELEASE ORAL at 08:09

## 2024-09-14 RX ADMIN — PANTOPRAZOLE SODIUM 40 MG: 40 TABLET, DELAYED RELEASE ORAL at 08:09

## 2024-09-14 RX ADMIN — ALPRAZOLAM 0.5 MG: 0.5 TABLET ORAL at 09:09

## 2024-09-14 NOTE — ASSESSMENT & PLAN NOTE
She reports her blood pressures to be chronically relatively low.  Chronic, controlled. Latest blood pressure and vitals reviewed-     Temp:  [96.7 °F (35.9 °C)-98.1 °F (36.7 °C)]   Pulse:  []   Resp:  [14-20]   BP: ()/(52-68)   SpO2:  [94 %-99 %] .   Home meds for hypertension were reviewed and noted below.   Hypertension Medications               lisinopriL (PRINIVIL,ZESTRIL) 40 MG tablet Take 1 tablet (40 mg total) by mouth once daily.    metoprolol succinate (TOPROL-XL) 100 MG 24 hr tablet Take 1 tablet (100 mg total) by mouth 2 (two) times daily.    NIFEdipine (PROCARDIA-XL) 30 MG (OSM) 24 hr tablet Take 30 mg by mouth once daily.            While in the hospital, will manage blood pressure as follows; Adjust home antihypertensive regimen as follows- hold nifedipine and lisinopril.    Will utilize p.r.n. blood pressure medication only if patient's blood pressure greater than 180/110 and she develops symptoms such as worsening chest pain or shortness of breath.

## 2024-09-14 NOTE — ASSESSMENT & PLAN NOTE
Patient with known Cirrhosis with Child's class Calculator for Child's score link- https://www.[employer].com/calc/340/child-tilley-score-cirrhosis-mortality#creator-insights. Co-morbidities are present and inclusive of ascites, portal hypertension, and hepatic encephalopathy.  MELD-Na score calculated; MELD 3.0: 11 at 9/14/2024  3:02 AM  MELD-Na: 6 at 9/14/2024  3:02 AM  Calculated from:  Serum Creatinine: 0.9 mg/dL (Using min of 1 mg/dL) at 9/14/2024  3:02 AM  Serum Sodium: 135 mmol/L at 9/14/2024  3:02 AM  Total Bilirubin: 0.3 mg/dL (Using min of 1 mg/dL) at 9/14/2024  3:02 AM  Serum Albumin: 2.5 g/dL at 9/14/2024  3:02 AM  INR(ratio): 1.0 at 9/14/2024  3:02 AM  Age at listing (hypothetical): 59 years  Sex: Female at 9/14/2024  3:02 AM      Continue chronic meds. Etiology likely NAFLD. Will avoid any hepatotoxic meds, and monitor CBC/CMP/INR for synthetic function.   Continue home lactulose. Start furosemide and spironolactone at lowest doses. Titrate doses up.     6.5L paracentesis, negative for paracentesis.

## 2024-09-14 NOTE — ASSESSMENT & PLAN NOTE
Patient with known Cirrhosis with Child's class Calculator for Child's score link- https://www.[employer].com/calc/340/child-tliley-score-cirrhosis-mortality#creator-insights. Co-morbidities are present and inclusive of ascites, portal hypertension, and hepatic encephalopathy.  MELD-Na score calculated; MELD 3.0: 11 at 9/13/2024  4:21 AM  MELD-Na: 6 at 9/13/2024  4:21 AM  Calculated from:  Serum Creatinine: 0.9 mg/dL (Using min of 1 mg/dL) at 9/13/2024  4:21 AM  Serum Sodium: 135 mmol/L at 9/13/2024  4:21 AM  Total Bilirubin: 0.5 mg/dL (Using min of 1 mg/dL) at 9/13/2024  4:21 AM  Serum Albumin: 2.6 g/dL at 9/13/2024  4:21 AM  INR(ratio): 1.0 at 9/13/2024  4:21 AM  Age at listing (hypothetical): 59 years  Sex: Female at 9/13/2024  4:21 AM      Continue chronic meds. Etiology likely NAFLD. Will avoid any hepatotoxic meds, and monitor CBC/CMP/INR for synthetic function.   Continue home lactulose. Start furosemide and spironolactone at lowest doses. Titrate doses up.     6.5L paracentesis, negative for paracentesis.

## 2024-09-14 NOTE — ASSESSMENT & PLAN NOTE
Hyponatremia is likely due to Cirrhosis. The patient's most recent sodium results are listed below.  Recent Labs     09/12/24  0512 09/13/24  0421 09/14/24  0302   * 135* 135*       Plan  - Correct the sodium by 4-6mEq in 24 hours.   - Will treat the hyponatremia with Fluid restriction of:  1.5 liter per day  - Monitor sodium Daily.   - Patient hyponatremia is stable

## 2024-09-14 NOTE — SUBJECTIVE & OBJECTIVE
Interval History:   Overall reporting feeling better.  Working with physical therapy.  Reports using a walker at home.  Continue diuresis.  Blood pressure was soft this morning however map is appropriate and continue diuresis.      Review of Systems   Constitutional:  Negative for chills and fever.   Gastrointestinal:  Negative for nausea and vomiting.   Neurological:  Negative for seizures and syncope.     Objective:     Vital Signs (Most Recent):  Temp: 97.8 °F (36.6 °C) (09/14/24 1149)  Pulse: 64 (09/14/24 1149)  Resp: 14 (09/14/24 1149)  BP: 98/63 (09/14/24 1149)  SpO2: 98 % (09/14/24 1149) Vital Signs (24h Range):  Temp:  [96.7 °F (35.9 °C)-98.2 °F (36.8 °C)] 97.8 °F (36.6 °C)  Pulse:  [] 64  Resp:  [14-20] 14  SpO2:  [94 %-99 %] 98 %  BP: ()/(60-69) 98/63     Weight: 79.3 kg (174 lb 13.2 oz)  Body mass index is 30.97 kg/m².  No intake or output data in the 24 hours ending 09/14/24 1507        Physical Exam  Vitals and nursing note reviewed.   Constitutional:       General: She is not in acute distress.     Appearance: She is well-developed. She is obese. She is not diaphoretic.      Interventions: She is not intubated.  Eyes:      Extraocular Movements: Extraocular movements intact.   Cardiovascular:      Rate and Rhythm: Tachycardia present. Rhythm irregular.   Pulmonary:      Effort: Pulmonary effort is normal. No accessory muscle usage or respiratory distress. She is not intubated.   Abdominal:      General: There is distension.   Skin:     Capillary Refill: Capillary refill takes less than 2 seconds.   Neurological:      Mental Status: She is alert and oriented to person, place, and time. Mental status is at baseline.      Motor: No seizure activity.   Psychiatric:         Attention and Perception: Attention normal.         Mood and Affect: Mood and affect normal.         Behavior: Behavior is cooperative.             Significant Labs: All pertinent labs within the past 24 hours have been  reviewed.    Recent Labs   Lab 09/14/24  0302   CALCIUM 8.9   ALBUMIN 2.5*   PROT 5.6*   *   K 3.6   CO2 18*      BUN 20   CREATININE 0.9   ALKPHOS 208*   ALT 7*   AST 18   BILITOT 0.3       Recent Labs   Lab 09/14/24  0302   WBC 5.18   RBC 2.83*   HGB 8.0*   HCT 25.8*      MCV 91   MCH 28.3   MCHC 31.0*         Significant Imaging: I have reviewed all pertinent imaging results/findings within the past 24 hours.

## 2024-09-14 NOTE — PT/OT/SLP PROGRESS
Occupational Therapy   Treatment  A client care conference was completed by the OTR and the CHRISTIAN prior to treatment by the OTR  to discuss the patient's POC and current status.     Name: Vicky Alvarado  MRN: [id number]  Admitting Diagnosis:  Cirrhosis of liver with ascites       Recommendations:     Discharge Recommendations: Low Intensity Therapy  Discharge Equipment Recommendations:  none  Barriers to discharge:  None    Assessment:     Vicky Alvarado is a 59 y.o. female with a medical diagnosis of Cirrhosis of liver with ascites.  She presents with the following performance deficits affecting function are weakness, impaired endurance, impaired self care skills, impaired functional mobility, gait instability, impaired balance, decreased lower extremity function. Pt tolerated tx session well. Pt with some unsteadiness in standing occasionally and required min verbal cues for RW management during functional mobility. Pt able to complete toileting and toilet transfer with some assistance required. Pt vocalized fear of falling 2/2 impaired standing balance at times and required reassurance/encouragement with AD. She will continue to benefit from OT services to safely maximize her independence.     Rehab Prognosis:  Good; patient would benefit from acute skilled OT services to address these deficits and reach maximum level of function.       Plan:     Patient to be seen 4 x/week to address the above listed problems via self-care/home management, therapeutic activities, therapeutic exercises  Plan of Care Expires: 09/27/24  Plan of Care Reviewed with: patient    Subjective     Chief Complaint: Fear of falling  Patient/Family Comments/goals: Pt agreeable to tx session. Pt asked if she should pull up undergarments prior to standing after completion of toileting  Pain/Comfort:  Pain Rating 1: 0/10    Objective:     Communicated with: nurseJulian,  prior to session.  Patient found supine with  (no active  lines) upon OT entry to room.    General Precautions: Standard, fall    Orthopedic Precautions:N/A  Braces: N/A  Respiratory Status: Room air     Occupational Performance:     Bed Mobility:    Patient completed Supine to Sit with supervision  Patient completed Sit to Supine with supervision     Functional Mobility/Transfers:  Patient completed Sit <> Stand Transfer with stand by assistance  with  rolling walker   Patient completed Toilet Transfer Step Transfer technique with contact guard assistance with  rolling walker and grab bars  Functional Mobility: Pt performed in room mobility into hallway of unit (~35-40 feet) with RW CGA to simulate household/community distances to improve safety, endurance, static/dynamic standing balance, and be able to perform occupations of choice.     Activities of Daily Living:  Grooming: stand by assistance as pt completed hand hygiene in stance after toiletin; min verbal cues required for RW management 2/2 pt leaving RW to the side while washing hands  Toileting: minimum assistance for pulling undergarment in standing after completion of bowel movement. Pt completed cornelio-hygiene seated on toilet SBA.  Pt educated and instructed on pulling up undergarments above knees prior to standing to decrease risk for falls and to maintain standing balance with AD      [facility] 6 Click ADL: 20    Treatment & Education:  Pt performed bed mobility, functional mobility/transfers, and ADLs as documented above.     Pt educated and instructed on the following:  OT POC  Role of CHRISTIAN  Use of call bell for all assistance  Addressed questions and /or concerns within CHRISTIAN scope of practice  Pt verbalized understanding     Patient left supine HOB elevated with all lines intact, call button in reach, nurse notified, and pt appears in NAD.    GOALS:   Multidisciplinary Problems       Occupational Therapy Goals          Problem: Occupational Therapy    Goal Priority Disciplines Outcome Interventions    Occupational Therapy Goal     OT, PT/OT Progressing    Description: Goals to be met by: 09-27-24     Patient will increase functional independence with ADLs by performing:    UE Dressing with Set-up Assistance.  LE Dressing with Set-up Assistance with AE as needed  Grooming while standing at sink with Modified [place].  Toileting from bedside commode with Modified [place] for hygiene and clothing management.   Supine to sit with [place].  Step transfer with Modified [place]  Toilet transfer to bedside commode with Modified [place].  Pt. To be Independent with HEP to improve level of endurance                         Time Tracking:     OT Date of Treatment: 09/14/24  OT Start Time: 1155  OT Stop Time: 1221  OT Total Time (min): 26 min    Billable Minutes:Self Care/Home Management 13  Therapeutic Activity 13    OT/GASPER: GASPER     Number of GASPER visits since last OT visit: 1 9/14/2024

## 2024-09-14 NOTE — CONSULTS
"Nutrition consult received stating "Recommendations for sarcopenia".    RD spoke w/ patient at time of last admit (8/6) - pt reported good appetite PTA & weight loss 2/2 Mounjaro. Pt appears nourished w/ no indicators of malnutrition.     Recommendations:  1) Diet modified to Low Na, High Protein w/ fluid restriction per MD.    Thanks!  Natacha, MS, RD, LDN   "

## 2024-09-14 NOTE — ASSESSMENT & PLAN NOTE
Hyponatremia is likely due to Cirrhosis. The patient's most recent sodium results are listed below.  Recent Labs     09/11/24  0458 09/12/24  0512 09/13/24  0421    135* 135*       Plan  - Correct the sodium by 4-6mEq in 24 hours.   - Will treat the hyponatremia with Fluid restriction of:  1.5 liter per day  - Monitor sodium Daily.   - Patient hyponatremia is stable

## 2024-09-14 NOTE — ASSESSMENT & PLAN NOTE
She reports her blood pressures to be chronically relatively low.  Chronic, controlled. Latest blood pressure and vitals reviewed-     Temp:  [96.7 °F (35.9 °C)-98.2 °F (36.8 °C)]   Pulse:  []   Resp:  [14-20]   BP: ()/(60-69)   SpO2:  [94 %-99 %] .   Home meds for hypertension were reviewed and noted below.   Hypertension Medications               lisinopriL (PRINIVIL,ZESTRIL) 40 MG tablet Take 1 tablet (40 mg total) by mouth once daily.    metoprolol succinate (TOPROL-XL) 100 MG 24 hr tablet Take 1 tablet (100 mg total) by mouth 2 (two) times daily.    NIFEdipine (PROCARDIA-XL) 30 MG (OSM) 24 hr tablet Take 30 mg by mouth once daily.            While in the hospital, will manage blood pressure as follows; Adjust home antihypertensive regimen as follows- hold nifedipine and lisinopril.    Will utilize p.r.n. blood pressure medication only if patient's blood pressure greater than 180/110 and she develops symptoms such as worsening chest pain or shortness of breath.

## 2024-09-14 NOTE — ASSESSMENT & PLAN NOTE
Due to lactulose induced diarrhea.  Will give topical antifungal      Appreciate wound care eval and recommendations
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
## 2024-09-15 LAB
ALBUMIN SERPL BCP-MCNC: 2.7 G/DL (ref 3.5–5.2)
ALP SERPL-CCNC: 225 U/L (ref 55–135)
ALT SERPL W/O P-5'-P-CCNC: 6 U/L (ref 10–44)
ANION GAP SERPL CALC-SCNC: 7 MMOL/L (ref 8–16)
AST SERPL-CCNC: 20 U/L (ref 10–40)
BASOPHILS # BLD AUTO: 0.07 K/UL (ref 0–0.2)
BASOPHILS NFR BLD: 1.3 % (ref 0–1.9)
BILIRUB SERPL-MCNC: 0.4 MG/DL (ref 0.1–1)
BUN SERPL-MCNC: 18 MG/DL (ref 6–20)
CALCIUM SERPL-MCNC: 9.3 MG/DL (ref 8.7–10.5)
CHLORIDE SERPL-SCNC: 107 MMOL/L (ref 95–110)
CO2 SERPL-SCNC: 17 MMOL/L (ref 23–29)
CREAT SERPL-MCNC: 1.1 MG/DL (ref 0.5–1.4)
DIFFERENTIAL METHOD BLD: ABNORMAL
EOSINOPHIL # BLD AUTO: 0.2 K/UL (ref 0–0.5)
EOSINOPHIL NFR BLD: 3.1 % (ref 0–8)
ERYTHROCYTE [DISTWIDTH] IN BLOOD BY AUTOMATED COUNT: 17 % (ref 11.5–14.5)
EST. GFR  (NO RACE VARIABLE): 57.5 ML/MIN/1.73 M^2
GLUCOSE SERPL-MCNC: 125 MG/DL (ref 70–110)
HCT VFR BLD AUTO: 29 % (ref 37–48.5)
HGB BLD-MCNC: 9.1 G/DL (ref 12–16)
IMM GRANULOCYTES # BLD AUTO: 0.05 K/UL (ref 0–0.04)
IMM GRANULOCYTES NFR BLD AUTO: 0.9 % (ref 0–0.5)
INR PPP: 1 (ref 0.8–1.2)
LYMPHOCYTES # BLD AUTO: 1.1 K/UL (ref 1–4.8)
LYMPHOCYTES NFR BLD: 19.7 % (ref 18–48)
MAGNESIUM SERPL-MCNC: 1.6 MG/DL (ref 1.6–2.6)
MCH RBC QN AUTO: 28.9 PG (ref 27–31)
MCHC RBC AUTO-ENTMCNC: 31.4 G/DL (ref 32–36)
MCV RBC AUTO: 92 FL (ref 82–98)
MONOCYTES # BLD AUTO: 0.8 K/UL (ref 0.3–1)
MONOCYTES NFR BLD: 14.3 % (ref 4–15)
NEUTROPHILS # BLD AUTO: 3.4 K/UL (ref 1.8–7.7)
NEUTROPHILS NFR BLD: 60.7 % (ref 38–73)
NRBC BLD-RTO: 0 /100 WBC
PHOSPHATE SERPL-MCNC: 3.8 MG/DL (ref 2.7–4.5)
PLATELET # BLD AUTO: 242 K/UL (ref 150–450)
PMV BLD AUTO: 10.4 FL (ref 9.2–12.9)
POTASSIUM SERPL-SCNC: 3.9 MMOL/L (ref 3.5–5.1)
PROT SERPL-MCNC: 6.4 G/DL (ref 6–8.4)
PROTHROMBIN TIME: 10.5 SEC (ref 9–12.5)
RBC # BLD AUTO: 3.15 M/UL (ref 4–5.4)
SODIUM SERPL-SCNC: 131 MMOL/L (ref 136–145)
WBC # BLD AUTO: 5.54 K/UL (ref 3.9–12.7)

## 2024-09-15 PROCEDURE — 80053 COMPREHEN METABOLIC PANEL: CPT | Performed by: STUDENT IN AN ORGANIZED HEALTH CARE EDUCATION/TRAINING PROGRAM

## 2024-09-15 PROCEDURE — 94761 N-INVAS EAR/PLS OXIMETRY MLT: CPT

## 2024-09-15 PROCEDURE — 25000003 PHARM REV CODE 250: Performed by: STUDENT IN AN ORGANIZED HEALTH CARE EDUCATION/TRAINING PROGRAM

## 2024-09-15 PROCEDURE — 25000003 PHARM REV CODE 250: Performed by: HOSPITALIST

## 2024-09-15 PROCEDURE — 94660 CPAP INITIATION&MGMT: CPT

## 2024-09-15 PROCEDURE — 84100 ASSAY OF PHOSPHORUS: CPT | Performed by: STUDENT IN AN ORGANIZED HEALTH CARE EDUCATION/TRAINING PROGRAM

## 2024-09-15 PROCEDURE — 27100171 HC OXYGEN HIGH FLOW UP TO 24 HOURS

## 2024-09-15 PROCEDURE — 20600001 HC STEP DOWN PRIVATE ROOM

## 2024-09-15 PROCEDURE — 36415 COLL VENOUS BLD VENIPUNCTURE: CPT | Performed by: STUDENT IN AN ORGANIZED HEALTH CARE EDUCATION/TRAINING PROGRAM

## 2024-09-15 PROCEDURE — 83735 ASSAY OF MAGNESIUM: CPT | Performed by: STUDENT IN AN ORGANIZED HEALTH CARE EDUCATION/TRAINING PROGRAM

## 2024-09-15 PROCEDURE — 99900035 HC TECH TIME PER 15 MIN (STAT)

## 2024-09-15 PROCEDURE — 85610 PROTHROMBIN TIME: CPT | Performed by: STUDENT IN AN ORGANIZED HEALTH CARE EDUCATION/TRAINING PROGRAM

## 2024-09-15 PROCEDURE — 85025 COMPLETE CBC W/AUTO DIFF WBC: CPT | Performed by: STUDENT IN AN ORGANIZED HEALTH CARE EDUCATION/TRAINING PROGRAM

## 2024-09-15 RX ORDER — SPIRONOLACTONE 100 MG/1
100 TABLET, FILM COATED ORAL DAILY
Status: DISCONTINUED | OUTPATIENT
Start: 2024-09-16 | End: 2024-09-17

## 2024-09-15 RX ORDER — FUROSEMIDE 40 MG/1
40 TABLET ORAL DAILY
Status: DISCONTINUED | OUTPATIENT
Start: 2024-09-16 | End: 2024-09-17

## 2024-09-15 RX ADMIN — MIDODRINE HYDROCHLORIDE 10 MG: 5 TABLET ORAL at 02:09

## 2024-09-15 RX ADMIN — FUROSEMIDE 20 MG: 20 TABLET ORAL at 09:09

## 2024-09-15 RX ADMIN — LACTULOSE 10 G: 20 SOLUTION ORAL at 09:09

## 2024-09-15 RX ADMIN — MIDODRINE HYDROCHLORIDE 10 MG: 5 TABLET ORAL at 09:09

## 2024-09-15 RX ADMIN — METOPROLOL SUCCINATE 100 MG: 100 TABLET, EXTENDED RELEASE ORAL at 09:09

## 2024-09-15 RX ADMIN — OXYCODONE 5 MG: 5 TABLET ORAL at 09:09

## 2024-09-15 RX ADMIN — MICONAZOLE NITRATE 2 % TOPICAL POWDER: at 09:09

## 2024-09-15 RX ADMIN — ALPRAZOLAM 0.5 MG: 0.5 TABLET ORAL at 09:09

## 2024-09-15 RX ADMIN — FERROUS SULFATE TAB EC 325 MG (65 MG FE EQUIVALENT) 1 EACH: 325 (65 FE) TABLET DELAYED RESPONSE at 09:09

## 2024-09-15 RX ADMIN — PANTOPRAZOLE SODIUM 40 MG: 40 TABLET, DELAYED RELEASE ORAL at 09:09

## 2024-09-15 RX ADMIN — DULOXETINE HYDROCHLORIDE 60 MG: 60 CAPSULE, DELAYED RELEASE ORAL at 09:09

## 2024-09-15 RX ADMIN — METHOCARBAMOL 500 MG: 500 TABLET ORAL at 03:09

## 2024-09-15 RX ADMIN — SPIRONOLACTONE 25 MG: 25 TABLET ORAL at 09:09

## 2024-09-15 NOTE — NURSING
Nurses Note -- 4 Eyes      9/15/2024   6:47 AM      Skin assessed during: {4eyes ATSD- System List:00335}      [] No Altered Skin Integrity Present    []Prevention Measures Documented      [] Yes- Altered Skin Integrity Present or Discovered   [] LDA Added if Not in Epic (Describe Wound)   [] New Altered Skin Integrity was Present on Admit and Documented in LDA   [] Wound Image Taken    Wound Care Consulted? {YES/NO:04651}    Attending Nurse:  Yocasta Miller RN/Staff Member:
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
## 2024-09-15 NOTE — SUBJECTIVE & OBJECTIVE
Interval History:   Overall reporting feeling better.  Working with physical therapy. Blood pressure has been low. Will hold diuretics. Will continue midodrine.      Review of Systems   Constitutional:  Negative for chills and fever.   Gastrointestinal:  Negative for nausea and vomiting.   Neurological:  Negative for seizures and syncope.     Objective:     Vital Signs (Most Recent):  Temp: 97.4 °F (36.3 °C) (09/15/24 1153)  Pulse: 70 (09/15/24 1153)  Resp: 17 (09/15/24 1153)  BP: (!) 81/53 (09/15/24 1153)  SpO2: (!) 93 % (09/15/24 1153) Vital Signs (24h Range):  Temp:  [97 °F (36.1 °C)-97.9 °F (36.6 °C)] 97.4 °F (36.3 °C)  Pulse:  [56-70] 70  Resp:  [12-20] 17  SpO2:  [89 %-99 %] 93 %  BP: ()/(52-71) 81/53     Weight: 79.3 kg (174 lb 13.2 oz)  Body mass index is 30.97 kg/m².    Intake/Output Summary (Last 24 hours) at 9/15/2024 1547  Last data filed at 9/15/2024 0902  Gross per 24 hour   Intake 200 ml   Output --   Net 200 ml           Physical Exam  Vitals and nursing note reviewed.   Constitutional:       General: She is not in acute distress.     Appearance: She is well-developed. She is obese. She is not diaphoretic.      Interventions: She is not intubated.  Eyes:      Extraocular Movements: Extraocular movements intact.   Cardiovascular:      Rate and Rhythm: Tachycardia present. Rhythm irregular.   Pulmonary:      Effort: Pulmonary effort is normal. No accessory muscle usage or respiratory distress. She is not intubated.   Abdominal:      General: There is distension.   Skin:     Capillary Refill: Capillary refill takes less than 2 seconds.   Neurological:      Mental Status: She is alert and oriented to person, place, and time. Mental status is at baseline.      Motor: No seizure activity.   Psychiatric:         Attention and Perception: Attention normal.         Mood and Affect: Mood and affect normal.         Behavior: Behavior is cooperative.             Significant Labs: All pertinent labs within  the past 24 hours have been reviewed.    Recent Labs   Lab 09/15/24  1041   CALCIUM 9.3   ALBUMIN 2.7*   PROT 6.4   *   K 3.9   CO2 17*      BUN 18   CREATININE 1.1   ALKPHOS 225*   ALT 6*   AST 20   BILITOT 0.4       Recent Labs   Lab 09/15/24  0342   WBC 5.54   RBC 3.15*   HGB 9.1*   HCT 29.0*      MCV 92   MCH 28.9   MCHC 31.4*         Significant Imaging: I have reviewed all pertinent imaging results/findings within the past 24 hours.

## 2024-09-15 NOTE — ASSESSMENT & PLAN NOTE
She reports her blood pressures to be chronically relatively low.  Chronic, controlled. Latest blood pressure and vitals reviewed-     Temp:  [97 °F (36.1 °C)-97.9 °F (36.6 °C)]   Pulse:  [56-70]   Resp:  [12-20]   BP: ()/(52-71)   SpO2:  [89 %-99 %] .   Home meds for hypertension were reviewed and noted below.   Hypertension Medications               lisinopriL (PRINIVIL,ZESTRIL) 40 MG tablet Take 1 tablet (40 mg total) by mouth once daily.    metoprolol succinate (TOPROL-XL) 100 MG 24 hr tablet Take 1 tablet (100 mg total) by mouth 2 (two) times daily.    NIFEdipine (PROCARDIA-XL) 30 MG (OSM) 24 hr tablet Take 30 mg by mouth once daily.            While in the hospital, will manage blood pressure as follows; Adjust home antihypertensive regimen as follows- hold nifedipine and lisinopril.    Will utilize p.r.n. blood pressure medication only if patient's blood pressure greater than 180/110 and she develops symptoms such as worsening chest pain or shortness of breath.

## 2024-09-15 NOTE — ASSESSMENT & PLAN NOTE
Patient with known Cirrhosis with Child's class Calculator for Child's score link- https://[email or web address].com/[email or web address]/child-tilley-score-cirrhosis-mortality#creator-insights. Co-morbidities are present and inclusive of ascites, portal hypertension, and hepatic encephalopathy.  MELD-Na score calculated; MELD 3.0: 15 at 9/15/2024 10:41 AM  MELD-Na: 7 at 9/15/2024 10:41 AM  Calculated from:  Serum Creatinine: 1.1 mg/dL at 9/15/2024 10:41 AM  Serum Sodium: 131 mmol/L at 9/15/2024 10:41 AM  Total Bilirubin: 0.4 mg/dL (Using min of 1 mg/dL) at 9/15/2024 10:41 AM  Serum Albumin: 2.7 g/dL at 9/15/2024 10:41 AM  INR(ratio): 1.0 at 9/15/2024  3:42 AM  Age at listing (hypothetical): 59 years  Sex: Female at 9/15/2024 10:41 AM      Continue chronic meds. Etiology likely NAFLD. Will avoid any hepatotoxic meds, and monitor CBC/CMP/INR for synthetic function.   Continue home lactulose. Start furosemide and spironolactone at lowest doses. Titrate doses up.     6.5L paracentesis, negative for paracentesis.

## 2024-09-15 NOTE — ASSESSMENT & PLAN NOTE
Hyponatremia is likely due to Cirrhosis. The patient's most recent sodium results are listed below.  Recent Labs     09/13/24  0421 09/14/24  0302 09/15/24  1041   * 135* 131*       Plan  - Correct the sodium by 4-6mEq in 24 hours.   - Will treat the hyponatremia with Fluid restriction of:  1.5 liter per day  - Monitor sodium Daily.   - Patient hyponatremia is stable

## 2024-09-15 NOTE — PLAN OF CARE
Problem: Adult Inpatient Plan of Care  Goal: Absence of Hospital-Acquired Illness or Injury  Outcome: Progressing  Intervention: Identify and Manage Fall Risk  Flowsheets (Taken 9/15/2024 7158)  Safety Promotion/Fall Prevention:   assistive device/personal item within reach   side rails raised x 2   lighting adjusted   medications reviewed  Plan of care was reviewed with the pt. [id number]. VSS. Cardiac monitoring was done. Pain management was reviewed with the pt. PRN pain meds were given. Intake and output was documented. No major changes throughout the day. Safety precautions were in placed.

## 2024-09-15 NOTE — TREATMENT PLAN
Hepatology Treatment Plan    Vicky Alvarado is a 59 y.o. female admitted to hospital 9/10/2024 (Hospital Day: 6) due to Cirrhosis of liver with ascites.     Interval History  Patient being diuresed. No acute events overnight.     Working with PT.     INR 1, T bili 0.3, AST 18, ALT 7    Objective  Temp:  [97 °F (36.1 °C)-97.9 °F (36.6 °C)] 97.4 °F (36.3 °C) (09/15 0746)  Pulse:  [56-67] 62 (09/15 0746)  BP: ()/(52-71) 115/71 (09/15 0907)  Resp:  [12-20] 18 (09/15 0920)  SpO2:  [89 %-99 %] 99 % (09/15 0746)        Laboratory    Lab Results   Component Value Date    WBC 5.54 09/15/2024    HGB 9.1 (L) 09/15/2024    HCT 29.0 (L) 09/15/2024    MCV 92 09/15/2024     09/15/2024       Lab Results   Component Value Date     (L) 09/14/2024    K 3.6 09/14/2024     09/14/2024    CO2 18 (L) 09/14/2024    BUN 20 09/14/2024    CREATININE 0.9 09/14/2024    CALCIUM 8.9 09/14/2024       Lab Results   Component Value Date    ALBUMIN 2.5 (L) 09/14/2024    ALT 7 (L) 09/14/2024    AST 18 09/14/2024    ALKPHOS 208 (H) 09/14/2024    BILITOT 0.3 09/14/2024       Lab Results   Component Value Date    INR 1.0 09/15/2024    INR 1.0 09/14/2024    INR 1.0 09/13/2024       MELD 3.0: 11 at 9/15/2024  3:42 AM  MELD-Na: 6 at 9/15/2024  3:42 AM  Calculated from:  Serum Creatinine: 0.9 mg/dL (Using min of 1 mg/dL) at 9/14/2024  3:02 AM  Serum Sodium: 135 mmol/L at 9/14/2024  3:02 AM  Total Bilirubin: 0.3 mg/dL (Using min of 1 mg/dL) at 9/14/2024  3:02 AM  Serum Albumin: 2.5 g/dL at 9/14/2024  3:02 AM  INR(ratio): 1.0 at 9/15/2024  3:42 AM  Age at listing (hypothetical): 59 years  Sex: Female at 9/15/2024  3:42 AM      Assessment  59F with history of AF, CAD, CKD, CHF, and cirrhosis likely multifactorial due to MASLD, congestive hepatopathy, and AD+ who presented for paracentesis. Patient has been having recurrence of large volume ascites requiring frequent paracenteses. Her etiology of liver cirrhosis is still unclear  at this time; previous biopsy on 8/5 showed cirrhosis and predominantly congestive changes, though this was in the setting of acute CHF and anasarca. She underwent ericka with 10L removed. Patient had not been set up with regularly scheduled paracenteses so she reports recurrence of her symptoms of abdominal distention and SOB. Her hepatologist has now arranged for weekly paracenteses. Underwent para on 9/12 with 6.5L removed, fluid studies not concerning for SBP. Repeat TTE showed EF 60-65%, PASP 40 mm of Hg.     #decompensated MASH cirrhosis c/b ascites    Plan  - Continue diuresis with Lasix & Aldactone.   - Continue weekly paracentesis.   - No plans for TIPS at this time.   - Will arrange clinic follow up.   - Low Na, high protein diet.   - please obtain daily CBC, BMP, LFT, INR  - Plan of care was discussed with primary team    Thank you for involving us in the care of Vicky Alvarado. Please call with any additional concerns or questions. Signing off.     Errol Banks MD, PGY-VI  Gastroenterology Fellow  [facility]

## 2024-09-16 LAB
BACTERIA SPEC AEROBE CULT: NO GROWTH
BASOPHILS # BLD AUTO: 0.08 K/UL (ref 0–0.2)
BASOPHILS NFR BLD: 1.6 % (ref 0–1.9)
DIFFERENTIAL METHOD BLD: ABNORMAL
EOSINOPHIL # BLD AUTO: 0.2 K/UL (ref 0–0.5)
EOSINOPHIL NFR BLD: 3 % (ref 0–8)
ERYTHROCYTE [DISTWIDTH] IN BLOOD BY AUTOMATED COUNT: 17.4 % (ref 11.5–14.5)
HCT VFR BLD AUTO: 26.2 % (ref 37–48.5)
HGB BLD-MCNC: 8.4 G/DL (ref 12–16)
IMM GRANULOCYTES # BLD AUTO: 0.03 K/UL (ref 0–0.04)
IMM GRANULOCYTES NFR BLD AUTO: 0.6 % (ref 0–0.5)
INR PPP: 1 (ref 0.8–1.2)
LYMPHOCYTES # BLD AUTO: 0.8 K/UL (ref 1–4.8)
LYMPHOCYTES NFR BLD: 17 % (ref 18–48)
MAGNESIUM SERPL-MCNC: 1.7 MG/DL (ref 1.6–2.6)
MCH RBC QN AUTO: 29.6 PG (ref 27–31)
MCHC RBC AUTO-ENTMCNC: 32.1 G/DL (ref 32–36)
MCV RBC AUTO: 92 FL (ref 82–98)
MONOCYTES # BLD AUTO: 0.7 K/UL (ref 0.3–1)
MONOCYTES NFR BLD: 13.8 % (ref 4–15)
NEUTROPHILS # BLD AUTO: 3.2 K/UL (ref 1.8–7.7)
NEUTROPHILS NFR BLD: 64 % (ref 38–73)
NRBC BLD-RTO: 0 /100 WBC
PHOSPHATE SERPL-MCNC: 3.7 MG/DL (ref 2.7–4.5)
PLATELET # BLD AUTO: 237 K/UL (ref 150–450)
PMV BLD AUTO: 10.5 FL (ref 9.2–12.9)
PROTHROMBIN TIME: 10.9 SEC (ref 9–12.5)
RBC # BLD AUTO: 2.84 M/UL (ref 4–5.4)
WBC # BLD AUTO: 4.93 K/UL (ref 3.9–12.7)

## 2024-09-16 PROCEDURE — 85025 COMPLETE CBC W/AUTO DIFF WBC: CPT | Performed by: STUDENT IN AN ORGANIZED HEALTH CARE EDUCATION/TRAINING PROGRAM

## 2024-09-16 PROCEDURE — 84100 ASSAY OF PHOSPHORUS: CPT | Performed by: STUDENT IN AN ORGANIZED HEALTH CARE EDUCATION/TRAINING PROGRAM

## 2024-09-16 PROCEDURE — 27100171 HC OXYGEN HIGH FLOW UP TO 24 HOURS

## 2024-09-16 PROCEDURE — 20600001 HC STEP DOWN PRIVATE ROOM

## 2024-09-16 PROCEDURE — 94761 N-INVAS EAR/PLS OXIMETRY MLT: CPT

## 2024-09-16 PROCEDURE — 25000003 PHARM REV CODE 250: Performed by: HOSPITALIST

## 2024-09-16 PROCEDURE — 25000003 PHARM REV CODE 250: Performed by: STUDENT IN AN ORGANIZED HEALTH CARE EDUCATION/TRAINING PROGRAM

## 2024-09-16 PROCEDURE — 85610 PROTHROMBIN TIME: CPT | Performed by: STUDENT IN AN ORGANIZED HEALTH CARE EDUCATION/TRAINING PROGRAM

## 2024-09-16 PROCEDURE — 83735 ASSAY OF MAGNESIUM: CPT | Performed by: STUDENT IN AN ORGANIZED HEALTH CARE EDUCATION/TRAINING PROGRAM

## 2024-09-16 PROCEDURE — 94660 CPAP INITIATION&MGMT: CPT

## 2024-09-16 PROCEDURE — 99900035 HC TECH TIME PER 15 MIN (STAT)

## 2024-09-16 PROCEDURE — 97530 THERAPEUTIC ACTIVITIES: CPT

## 2024-09-16 PROCEDURE — 97530 THERAPEUTIC ACTIVITIES: CPT | Mod: CQ

## 2024-09-16 PROCEDURE — 97116 GAIT TRAINING THERAPY: CPT | Mod: CQ

## 2024-09-16 PROCEDURE — 36415 COLL VENOUS BLD VENIPUNCTURE: CPT | Performed by: STUDENT IN AN ORGANIZED HEALTH CARE EDUCATION/TRAINING PROGRAM

## 2024-09-16 RX ORDER — MIDODRINE HYDROCHLORIDE 5 MG/1
15 TABLET ORAL 3 TIMES DAILY
Status: DISCONTINUED | OUTPATIENT
Start: 2024-09-16 | End: 2024-09-18 | Stop reason: HOSPADM

## 2024-09-16 RX ORDER — MIDODRINE HYDROCHLORIDE 5 MG/1
5 TABLET ORAL ONCE
Status: COMPLETED | OUTPATIENT
Start: 2024-09-16 | End: 2024-09-16

## 2024-09-16 RX ADMIN — MIDODRINE HYDROCHLORIDE 10 MG: 5 TABLET ORAL at 08:09

## 2024-09-16 RX ADMIN — METHOCARBAMOL 500 MG: 500 TABLET ORAL at 08:09

## 2024-09-16 RX ADMIN — MIDODRINE HYDROCHLORIDE 5 MG: 5 TABLET ORAL at 10:09

## 2024-09-16 RX ADMIN — METOPROLOL SUCCINATE 100 MG: 100 TABLET, EXTENDED RELEASE ORAL at 08:09

## 2024-09-16 RX ADMIN — FUROSEMIDE 40 MG: 40 TABLET ORAL at 10:09

## 2024-09-16 RX ADMIN — MIDODRINE HYDROCHLORIDE 15 MG: 5 TABLET ORAL at 08:09

## 2024-09-16 RX ADMIN — OXYCODONE 5 MG: 5 TABLET ORAL at 08:09

## 2024-09-16 RX ADMIN — MICONAZOLE NITRATE 2 % TOPICAL POWDER: at 08:09

## 2024-09-16 RX ADMIN — MIDODRINE HYDROCHLORIDE 15 MG: 5 TABLET ORAL at 03:09

## 2024-09-16 RX ADMIN — TRAZODONE HYDROCHLORIDE 100 MG: 100 TABLET ORAL at 08:09

## 2024-09-16 RX ADMIN — SPIRONOLACTONE 100 MG: 100 TABLET ORAL at 10:09

## 2024-09-16 RX ADMIN — ALPRAZOLAM 0.5 MG: 0.5 TABLET ORAL at 08:09

## 2024-09-16 RX ADMIN — PANTOPRAZOLE SODIUM 40 MG: 40 TABLET, DELAYED RELEASE ORAL at 08:09

## 2024-09-16 RX ADMIN — FERROUS SULFATE TAB EC 325 MG (65 MG FE EQUIVALENT) 1 EACH: 325 (65 FE) TABLET DELAYED RESPONSE at 08:09

## 2024-09-16 RX ADMIN — OXYCODONE 5 MG: 5 TABLET ORAL at 05:09

## 2024-09-16 RX ADMIN — METHOCARBAMOL 500 MG: 500 TABLET ORAL at 01:09

## 2024-09-16 NOTE — PT/OT/SLP PROGRESS
"Occupational Therapy   Treatment    Name: Vicky Alvarado  MRN: [id number]  Admitting Diagnosis:  Cirrhosis of liver with ascites       Recommendations:     Discharge Recommendations: Low Intensity Therapy  Discharge Equipment Recommendations:  none  Barriers to discharge:  None    Assessment:     Vicky Alvarado is a 59 y.o. female with a medical diagnosis of Cirrhosis of liver with ascites.  Pt tolerated session well and without incident, but she continues to require (A) with ADLs and mobility.  She presents with the following.  Performance deficits affecting function are weakness, impaired endurance, impaired self care skills, impaired functional mobility, gait instability, impaired balance, pain, decreased safety awareness, decreased lower extremity function.     Rehab Prognosis:  Good; patient would benefit from acute skilled OT services to address these deficits and reach maximum level of function.       Plan:     Patient to be seen 4 x/week to address the above listed problems via self-care/home management, therapeutic activities, therapeutic exercises  Plan of Care Expires: 09/27/24  Plan of Care Reviewed with: patient    Subjective   "I don't like being forgetful."  Chief Complaint: bilateral wrist and ankle pain  Patient/Family Comments/goals: to get stronger  Pain/Comfort:  Pain Rating 1: other (see comments) (not rated but pt said "pretty significant")  Location - Side 1: Bilateral  Location - Orientation 1: generalized  Location 1: wrist (and B ankles)  Pain Addressed 1: Pre-medicate for activity, Distraction  Pain Rating Post-Intervention 1: other (see comments) (not rated but pt said was "better")    Objective:     Communicated with: nursing prior to session.  Patient found up in chair with peripheral IV (IV not connected and running) with her nurse present upon OT entry to room.    General Precautions: Standard, fall    Orthopedic Precautions:N/A  Braces: N/A  Respiratory Status: Room " air     Occupational Performance:     Bed Mobility:    N/A due to pt's sitting in bedside chair at beginning and at end of session    Functional Mobility/Transfers:  Patient completed Sit <> Stand Transfer x 1 trial (2 attempts) from bedside chair with minimum assistance with rolling walker   Functional Mobility: Pt ambulated ~70 ft with Min A with RW, requiring (A) to maneuver the walker while turning and cueing and (A) to remain close to and inside the walker.  She reported no dizziness.     Activities of Daily Living:  Upper Body Dressing: minimum assistance to don back gown as a robe/mange line while seated in chair  Lower Body Dressing: modified independence to reach and adjust non-skid sock on her L foot while reclined in chair   Pt already performed grooming and had no toileting needs.    [facility] 6 Click ADL: 19    Treatment & Education:  Pt edu on role of OT, POC, safety when performing self care tasks, benefit of performing OOB activity, and safety when performing functional transfers and mobility.    - Self care tasks completed-- as noted above      Patient left up in chair with all lines intact and call button in reach    GOALS:   Multidisciplinary Problems       Occupational Therapy Goals          Problem: Occupational Therapy    Goal Priority Disciplines Outcome Interventions   Occupational Therapy Goal     OT, PT/OT Progressing    Description: Goals to be met by: 09-27-24     Patient will increase functional independence with ADLs by performing:    UE Dressing with Set-up Assistance.  LE Dressing with Set-up Assistance with AE as needed  Grooming while standing at sink with Modified Ellis.  Toileting from bedside commode with Modified Ellis for hygiene and clothing management.   Supine to sit with Ellis.  Step transfer with Modified Ellis  Toilet transfer to bedside commode with Modified Ellis.  Pt. To be Independent with HEP to improve level of endurance                          Time Tracking:     OT Date of Treatment: 09/16/24  OT Start Time: 1024  OT Stop Time: 1049  OT Total Time (min): 25 min    Billable Minutes:Therapeutic Activity 25 min    OT/GASPER: OT     Number of GASPER visits since last OT visit: 1    9/16/2024

## 2024-09-16 NOTE — PLAN OF CARE
Patient received awake and alert. Patient complained of pain to  left wrist. Patient medicated per MAR. Patient ambulated inside room with 2 ww without discomfort or challenges. Pain was adequately manage with prescribed medication as per patient verbalizing same. Continue to monitor patient.

## 2024-09-16 NOTE — ASSESSMENT & PLAN NOTE
Hyponatremia is likely due to Cirrhosis. The patient's most recent sodium results are listed below.  Recent Labs     09/14/24  0302 09/15/24  1041   * 131*       Plan  - Correct the sodium by 4-6mEq in 24 hours.   - Will treat the hyponatremia with Fluid restriction of:  1.5 liter per day  - Monitor sodium Daily.   - Patient hyponatremia is stable

## 2024-09-16 NOTE — SUBJECTIVE & OBJECTIVE
Interval History:   Working with physical therapy.  Sitting at bedside chair.    Blood pressures were low this morning and had to delay giving her diuretics.  We will increase the dose of midodrine.  We will decrease her dose of metoprolol.    Review of Systems   Constitutional:  Negative for chills and fever.   Gastrointestinal:  Negative for nausea and vomiting.   Neurological:  Negative for seizures and syncope.     Objective:     Vital Signs (Most Recent):  Temp: 97.6 °F (36.4 °C) (09/16/24 1557)  Pulse: (!) 55 (09/16/24 1557)  Resp: 20 (09/16/24 1713)  BP: 106/67 (09/16/24 1557)  SpO2: 98 % (09/16/24 1557) Vital Signs (24h Range):  Temp:  [97.2 °F (36.2 °C)-98.4 °F (36.9 °C)] 97.6 °F (36.4 °C)  Pulse:  [52-74] 55  Resp:  [8-20] 20  SpO2:  [96 %-98 %] 98 %  BP: ()/(44-84) 106/67     Weight: 79.3 kg (174 lb 13.2 oz)  Body mass index is 30.97 kg/m².    Intake/Output Summary (Last 24 hours) at 9/16/2024 1808  Last data filed at 9/16/2024 1325  Gross per 24 hour   Intake 400 ml   Output --   Net 400 ml           Physical Exam  Vitals and nursing note reviewed.   Constitutional:       General: She is not in acute distress.     Appearance: She is well-developed. She is obese. She is not diaphoretic.      Interventions: She is not intubated.  Eyes:      Extraocular Movements: Extraocular movements intact.   Cardiovascular:      Rate and Rhythm: Tachycardia present. Rhythm irregular.   Pulmonary:      Effort: Pulmonary effort is normal. No accessory muscle usage or respiratory distress. She is not intubated.   Abdominal:      General: There is distension.   Skin:     Capillary Refill: Capillary refill takes less than 2 seconds.   Neurological:      Mental Status: She is alert and oriented to person, place, and time. Mental status is at baseline.      Motor: No seizure activity.   Psychiatric:         Attention and Perception: Attention normal.         Mood and Affect: Mood and affect normal.         Behavior:  "Behavior is cooperative.             Significant Labs: All pertinent labs within the past 24 hours have been reviewed.    No results for input(s): "GLUCOSE", "CALCIUM", "ALBUMIN", "PROT", "NA", "K", "CO2", "CL", "BUN", "CREATININE", "ALKPHOS", "ALT", "AST", "BILITOT" in the last 24 hours.      Recent Labs   Lab 09/16/24  0541   WBC 4.93   RBC 2.84*   HGB 8.4*   HCT 26.2*      MCV 92   MCH 29.6   MCHC 32.1         Significant Imaging: I have reviewed all pertinent imaging results/findings within the past 24 hours.  "
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
## 2024-09-16 NOTE — ASSESSMENT & PLAN NOTE
Patient with known Cirrhosis with Child's class Calculator for Child's score link- https://www.GEEKmaister.com.com/calc/340/child-tilley-score-cirrhosis-mortality#creator-insights. Co-morbidities are present and inclusive of ascites, portal hypertension, and hepatic encephalopathy.  MELD-Na score calculated; MELD 3.0: 15 at 9/16/2024  5:41 AM  MELD-Na: 7 at 9/16/2024  5:41 AM  Calculated from:  Serum Creatinine: 1.1 mg/dL at 9/15/2024 10:41 AM  Serum Sodium: 131 mmol/L at 9/15/2024 10:41 AM  Total Bilirubin: 0.4 mg/dL (Using min of 1 mg/dL) at 9/15/2024 10:41 AM  Serum Albumin: 2.7 g/dL at 9/15/2024 10:41 AM  INR(ratio): 1.0 at 9/16/2024  5:41 AM  Age at listing (hypothetical): 59 years  Sex: Female at 9/16/2024  5:41 AM      Continue chronic meds. Etiology likely NAFLD. Will avoid any hepatotoxic meds, and monitor CBC/CMP/INR for synthetic function.   Continue home lactulose. Start furosemide and spironolactone at lowest doses. Titrate doses up.     6.5L paracentesis, negative for paracentesis.

## 2024-09-16 NOTE — PT/OT/SLP PROGRESS
Physical Therapy Treatment    Patient Name:  Vicky Alvarado   MRN:  [id number]    Recommendations:     Discharge Recommendations: Low Intensity Therapy  Discharge Equipment Recommendations: none  Barriers to discharge: Decreased caregiver support    Assessment:     Vicky Alvarado is a 59 y.o. female admitted with a medical diagnosis of Cirrhosis of liver with ascites.  She presents with the following impairments/functional limitations: weakness, impaired endurance, impaired self care skills, impaired functional mobility, gait instability, impaired balance, decreased upper extremity function, decreased lower extremity function, decreased safety awareness requiring max/mod assistance and verbal cues for sit < > stand transitions; min A for bed mob and gait to prevent falls due to weakness, FFP, and instability.  Pt remains motivated to participate in PT session and will cont to benefit from skilled PT intervention..    Rehab Prognosis: Good and Fair; patient would benefit from acute skilled PT services to address these deficits and reach maximum level of function.    Recent Surgery: * No surgery found *      Plan:     During this hospitalization, patient to be seen 4 x/week to address the identified rehab impairments via gait training, therapeutic activities, therapeutic exercises, neuromuscular re-education, wheelchair management/training and progress toward the following goals:    Plan of Care Expires:  10/13/24    Subjective     Chief Complaint: weakness, fatigue  Pain/Comfort:  Pain Rating 1: 0/10  Pain Rating Post-Intervention 1: 0/10      Objective:     Communicated with nurse (Christi) prior to session.  Patient found up in chair with  (no family present) upon PT entry to room.     General Precautions: Standard, fall  Orthopedic Precautions: N/A  Braces: N/A  Respiratory Status: Room air     Functional Mobility:  Transfers:     Sit to Stand:  from BS chair with max A of 1 for hip elevation (low  surface); from EOB with mod A of 1 (moderate surface) with rolling walker  Gait: RW min/CGA for balance and mgt of AD 75ft in hallway.  Pt demonstrates FFP, B LE instability, head down  Balance: static sitting at the EOB with close sup with vc's for upright posture due to slight post lean; static standing with RW CGA      AM-PAC 6 CLICK MOBILITY  Turning over in bed (including adjusting bedclothes, sheets and blankets)?: 3  Sitting down on and standing up from a chair with arms (e.g., wheelchair, bedside commode, etc.): 2  Moving from lying on back to sitting on the side of the bed?: 3  Moving to and from a bed to a chair (including a wheelchair)?: 2  Need to walk in hospital room?: 3  Climbing 3-5 steps with a railing?: 1  Basic Mobility Total Score: 14       Treatment & Education:  Patient provided with daily orientation and goals of this PT session. They were educated to call for assistance and to transfer with hospital staff only.  Also, pt was educated on the effects of prolonged immobility and the importance of performing OOB activity and exercises to promote healing and reduce recovery time    Patient left up in chair (seat built up with blankets and waffle cushion) with call button in reach and nurse notified..    GOALS:   Multidisciplinary Problems       Physical Therapy Goals          Problem: Physical Therapy    Goal Priority Disciplines Outcome Goal Variances Interventions   Physical Therapy Goal     PT, PT/OT Progressing     Description: Goals to be met by: 24     Patient will increase functional independence with mobility by performin. Supine to sit with [place]  2. Sit to supine with [place]  3. Sit to stand transfer with Stand-by Assistance  4. Bed to chair transfer with Stand-by Assistance using Rolling Walker  5. Gait  x 50 feet with Stand-by Assistance using Rolling Walker.   6. Ascend/Descend 4 inch curb step with Stand-by Assistance using Rolling Walker.  7. Lower extremity  exercise program x15 reps per handout, with assistance as needed                         Time Tracking:     PT Received On: 09/16/24  PT Start Time: 1428     PT Stop Time: 1451  PT Total Time (min): 23 min     Billable Minutes: Gait Training 13 and Therapeutic Activity 10    Treatment Type: Treatment  PT/PTA: PTA     Number of PTA visits since last PT visit: 1 09/16/2024

## 2024-09-16 NOTE — ASSESSMENT & PLAN NOTE
She reports her blood pressures to be chronically relatively low.  Chronic, controlled. Latest blood pressure and vitals reviewed-     Temp:  [97.2 °F (36.2 °C)-98.4 °F (36.9 °C)]   Pulse:  [52-74]   Resp:  [8-20]   BP: ()/(44-84)   SpO2:  [96 %-98 %] .   Home meds for hypertension were reviewed and noted below.   Hypertension Medications               lisinopriL (PRINIVIL,ZESTRIL) 40 MG tablet Take 1 tablet (40 mg total) by mouth once daily.    metoprolol succinate (TOPROL-XL) 100 MG 24 hr tablet Take 1 tablet (100 mg total) by mouth 2 (two) times daily.    NIFEdipine (PROCARDIA-XL) 30 MG (OSM) 24 hr tablet Take 30 mg by mouth once daily.            While in the hospital, will manage blood pressure as follows; Adjust home antihypertensive regimen as follows- hold nifedipine and lisinopril.    Will utilize p.r.n. blood pressure medication only if patient's blood pressure greater than 180/110 and she develops symptoms such as worsening chest pain or shortness of breath.

## 2024-09-17 LAB
ALBUMIN FLD-MCNC: 1.4 G/DL
ALBUMIN SERPL BCP-MCNC: 2.5 G/DL (ref 3.5–5.2)
ALP SERPL-CCNC: 205 U/L (ref 55–135)
ALT SERPL W/O P-5'-P-CCNC: 5 U/L (ref 10–44)
ANION GAP SERPL CALC-SCNC: 6 MMOL/L (ref 8–16)
APPEARANCE FLD: CLEAR
AST SERPL-CCNC: 16 U/L (ref 10–40)
BASOPHILS # BLD AUTO: 0.06 K/UL (ref 0–0.2)
BASOPHILS NFR BLD: 1.4 % (ref 0–1.9)
BILIRUB SERPL-MCNC: 0.4 MG/DL (ref 0.1–1)
BODY FLD TYPE: NORMAL
BODY FLUID SOURCE, LDH: NORMAL
BUN SERPL-MCNC: 18 MG/DL (ref 6–20)
CALCIUM SERPL-MCNC: 9.4 MG/DL (ref 8.7–10.5)
CHLORIDE SERPL-SCNC: 106 MMOL/L (ref 95–110)
CO2 SERPL-SCNC: 18 MMOL/L (ref 23–29)
COLOR FLD: YELLOW
CREAT SERPL-MCNC: 1 MG/DL (ref 0.5–1.4)
DIFFERENTIAL METHOD BLD: ABNORMAL
EOSINOPHIL # BLD AUTO: 0.1 K/UL (ref 0–0.5)
EOSINOPHIL NFR BLD: 2.1 % (ref 0–8)
EOSINOPHIL NFR FLD MANUAL: 1 %
ERYTHROCYTE [DISTWIDTH] IN BLOOD BY AUTOMATED COUNT: 17 % (ref 11.5–14.5)
EST. GFR  (NO RACE VARIABLE): >60 ML/MIN/1.73 M^2
GLUCOSE SERPL-MCNC: 121 MG/DL (ref 70–110)
GRAM STN SPEC: NORMAL
GRAM STN SPEC: NORMAL
HCT VFR BLD AUTO: 25.2 % (ref 37–48.5)
HGB BLD-MCNC: 8.2 G/DL (ref 12–16)
IMM GRANULOCYTES # BLD AUTO: 0.03 K/UL (ref 0–0.04)
IMM GRANULOCYTES NFR BLD AUTO: 0.7 % (ref 0–0.5)
INR PPP: 1 (ref 0.8–1.2)
LDH FLD L TO P-CCNC: 100 U/L
LYMPHOCYTES # BLD AUTO: 0.8 K/UL (ref 1–4.8)
LYMPHOCYTES NFR BLD: 19.3 % (ref 18–48)
LYMPHOCYTES NFR FLD MANUAL: 70 %
MAGNESIUM SERPL-MCNC: 1.6 MG/DL (ref 1.6–2.6)
MCH RBC QN AUTO: 29.6 PG (ref 27–31)
MCHC RBC AUTO-ENTMCNC: 32.5 G/DL (ref 32–36)
MCV RBC AUTO: 91 FL (ref 82–98)
MONOCYTES # BLD AUTO: 0.6 K/UL (ref 0.3–1)
MONOCYTES NFR BLD: 13.8 % (ref 4–15)
MONOS+MACROS NFR FLD MANUAL: 28 %
NEUTROPHILS # BLD AUTO: 2.7 K/UL (ref 1.8–7.7)
NEUTROPHILS NFR BLD: 62.7 % (ref 38–73)
NEUTROPHILS NFR FLD MANUAL: 1 %
NRBC BLD-RTO: 0 /100 WBC
PHOSPHATE SERPL-MCNC: 3.3 MG/DL (ref 2.7–4.5)
PLATELET # BLD AUTO: 229 K/UL (ref 150–450)
PMV BLD AUTO: 9.8 FL (ref 9.2–12.9)
POTASSIUM SERPL-SCNC: 4.1 MMOL/L (ref 3.5–5.1)
PROT FLD-MCNC: 2.4 G/DL
PROT SERPL-MCNC: 6.2 G/DL (ref 6–8.4)
PROTHROMBIN TIME: 11 SEC (ref 9–12.5)
RBC # BLD AUTO: 2.77 M/UL (ref 4–5.4)
SODIUM SERPL-SCNC: 130 MMOL/L (ref 136–145)
SPECIMEN SOURCE: NORMAL
SPECIMEN SOURCE: NORMAL
WBC # BLD AUTO: 4.29 K/UL (ref 3.9–12.7)
WBC # FLD: 117 /CU MM

## 2024-09-17 PROCEDURE — 82042 OTHER SOURCE ALBUMIN QUAN EA: CPT | Performed by: STUDENT IN AN ORGANIZED HEALTH CARE EDUCATION/TRAINING PROGRAM

## 2024-09-17 PROCEDURE — 83735 ASSAY OF MAGNESIUM: CPT | Performed by: STUDENT IN AN ORGANIZED HEALTH CARE EDUCATION/TRAINING PROGRAM

## 2024-09-17 PROCEDURE — P9047 ALBUMIN (HUMAN), 25%, 50ML: HCPCS | Mod: JZ,JG

## 2024-09-17 PROCEDURE — 84100 ASSAY OF PHOSPHORUS: CPT | Performed by: STUDENT IN AN ORGANIZED HEALTH CARE EDUCATION/TRAINING PROGRAM

## 2024-09-17 PROCEDURE — 25000003 PHARM REV CODE 250: Performed by: STUDENT IN AN ORGANIZED HEALTH CARE EDUCATION/TRAINING PROGRAM

## 2024-09-17 PROCEDURE — 20600001 HC STEP DOWN PRIVATE ROOM

## 2024-09-17 PROCEDURE — 63600175 PHARM REV CODE 636 W HCPCS: Performed by: STUDENT IN AN ORGANIZED HEALTH CARE EDUCATION/TRAINING PROGRAM

## 2024-09-17 PROCEDURE — 83615 LACTATE (LD) (LDH) ENZYME: CPT | Performed by: STUDENT IN AN ORGANIZED HEALTH CARE EDUCATION/TRAINING PROGRAM

## 2024-09-17 PROCEDURE — 94761 N-INVAS EAR/PLS OXIMETRY MLT: CPT

## 2024-09-17 PROCEDURE — 97110 THERAPEUTIC EXERCISES: CPT

## 2024-09-17 PROCEDURE — 89051 BODY FLUID CELL COUNT: CPT | Performed by: STUDENT IN AN ORGANIZED HEALTH CARE EDUCATION/TRAINING PROGRAM

## 2024-09-17 PROCEDURE — 80053 COMPREHEN METABOLIC PANEL: CPT | Performed by: STUDENT IN AN ORGANIZED HEALTH CARE EDUCATION/TRAINING PROGRAM

## 2024-09-17 PROCEDURE — 85025 COMPLETE CBC W/AUTO DIFF WBC: CPT | Performed by: STUDENT IN AN ORGANIZED HEALTH CARE EDUCATION/TRAINING PROGRAM

## 2024-09-17 PROCEDURE — 87205 SMEAR GRAM STAIN: CPT | Performed by: STUDENT IN AN ORGANIZED HEALTH CARE EDUCATION/TRAINING PROGRAM

## 2024-09-17 PROCEDURE — 94760 N-INVAS EAR/PLS OXIMETRY 1: CPT

## 2024-09-17 PROCEDURE — 36415 COLL VENOUS BLD VENIPUNCTURE: CPT | Performed by: STUDENT IN AN ORGANIZED HEALTH CARE EDUCATION/TRAINING PROGRAM

## 2024-09-17 PROCEDURE — 94660 CPAP INITIATION&MGMT: CPT

## 2024-09-17 PROCEDURE — 25000003 PHARM REV CODE 250: Performed by: HOSPITALIST

## 2024-09-17 PROCEDURE — 87075 CULTR BACTERIA EXCEPT BLOOD: CPT | Performed by: STUDENT IN AN ORGANIZED HEALTH CARE EDUCATION/TRAINING PROGRAM

## 2024-09-17 PROCEDURE — 97530 THERAPEUTIC ACTIVITIES: CPT

## 2024-09-17 PROCEDURE — 63600175 PHARM REV CODE 636 W HCPCS: Mod: JZ,JG

## 2024-09-17 PROCEDURE — 84157 ASSAY OF PROTEIN OTHER: CPT | Performed by: STUDENT IN AN ORGANIZED HEALTH CARE EDUCATION/TRAINING PROGRAM

## 2024-09-17 PROCEDURE — 99900035 HC TECH TIME PER 15 MIN (STAT)

## 2024-09-17 PROCEDURE — 87070 CULTURE OTHR SPECIMN AEROBIC: CPT | Performed by: STUDENT IN AN ORGANIZED HEALTH CARE EDUCATION/TRAINING PROGRAM

## 2024-09-17 PROCEDURE — 85610 PROTHROMBIN TIME: CPT | Performed by: STUDENT IN AN ORGANIZED HEALTH CARE EDUCATION/TRAINING PROGRAM

## 2024-09-17 RX ORDER — ALBUMIN HUMAN 250 G/1000ML
SOLUTION INTRAVENOUS
Status: COMPLETED
Start: 2024-09-17 | End: 2024-09-17

## 2024-09-17 RX ORDER — MAGNESIUM SULFATE HEPTAHYDRATE 40 MG/ML
2 INJECTION, SOLUTION INTRAVENOUS ONCE
Status: COMPLETED | OUTPATIENT
Start: 2024-09-17 | End: 2024-09-17

## 2024-09-17 RX ORDER — SPIRONOLACTONE 25 MG/1
50 TABLET ORAL DAILY
Status: DISCONTINUED | OUTPATIENT
Start: 2024-09-18 | End: 2024-09-18 | Stop reason: HOSPADM

## 2024-09-17 RX ORDER — FUROSEMIDE 20 MG/1
20 TABLET ORAL DAILY
Status: DISCONTINUED | OUTPATIENT
Start: 2024-09-18 | End: 2024-09-18 | Stop reason: HOSPADM

## 2024-09-17 RX ADMIN — OXYCODONE 5 MG: 5 TABLET ORAL at 05:09

## 2024-09-17 RX ADMIN — OXYCODONE 5 MG: 5 TABLET ORAL at 08:09

## 2024-09-17 RX ADMIN — MICONAZOLE NITRATE 2 % TOPICAL POWDER: at 09:09

## 2024-09-17 RX ADMIN — MIDODRINE HYDROCHLORIDE 15 MG: 5 TABLET ORAL at 09:09

## 2024-09-17 RX ADMIN — MAGNESIUM SULFATE HEPTAHYDRATE 2 G: 40 INJECTION, SOLUTION INTRAVENOUS at 09:09

## 2024-09-17 RX ADMIN — OXYCODONE 5 MG: 5 TABLET ORAL at 03:09

## 2024-09-17 RX ADMIN — DULOXETINE HYDROCHLORIDE 60 MG: 60 CAPSULE, DELAYED RELEASE ORAL at 09:09

## 2024-09-17 RX ADMIN — ALBUMIN (HUMAN): 25 SOLUTION INTRAVENOUS at 02:09

## 2024-09-17 RX ADMIN — FERROUS SULFATE TAB EC 325 MG (65 MG FE EQUIVALENT) 1 EACH: 325 (65 FE) TABLET DELAYED RESPONSE at 09:09

## 2024-09-17 RX ADMIN — MIDODRINE HYDROCHLORIDE 15 MG: 5 TABLET ORAL at 03:09

## 2024-09-17 RX ADMIN — MIDODRINE HYDROCHLORIDE 15 MG: 5 TABLET ORAL at 08:09

## 2024-09-17 RX ADMIN — PANTOPRAZOLE SODIUM 40 MG: 40 TABLET, DELAYED RELEASE ORAL at 09:09

## 2024-09-17 RX ADMIN — ALPRAZOLAM 0.5 MG: 0.5 TABLET ORAL at 09:09

## 2024-09-17 RX ADMIN — FUROSEMIDE 40 MG: 40 TABLET ORAL at 09:09

## 2024-09-17 RX ADMIN — SPIRONOLACTONE 100 MG: 100 TABLET ORAL at 09:09

## 2024-09-17 RX ADMIN — ALPRAZOLAM 0.5 MG: 0.5 TABLET ORAL at 08:09

## 2024-09-17 RX ADMIN — MICONAZOLE NITRATE 2 % TOPICAL POWDER: at 08:09

## 2024-09-17 RX ADMIN — ALBUMIN (HUMAN): 12.5 SOLUTION INTRAVENOUS at 02:09

## 2024-09-17 RX ADMIN — ONDANSETRON 8 MG: 8 TABLET, ORALLY DISINTEGRATING ORAL at 08:09

## 2024-09-17 NOTE — PROGRESS NOTES
Radiology Post-Procedure Note    Pre Op Diagnosis: Ascites  Post Op Diagnosis: Same    Procedure: Ultrasound Guided Paracentesis    Procedure performed by: Shannen Callahan PA-C    Written Informed Consent Obtained: Yes  Specimen Removed: YES (5450mL yellow, clear)  Estimated Blood Loss: Minimal    Findings:   Successful paracentesis from LLQ.  Albumin administered PRN per protocol.    Patient tolerated procedure well.    Shannen Callahan PA-C  Interventional Radiology  [phone]

## 2024-09-17 NOTE — ASSESSMENT & PLAN NOTE
She reports her blood pressures to be chronically relatively low.  Chronic, controlled. Latest blood pressure and vitals reviewed-     Temp:  [97.8 °F (36.6 °C)-98.4 °F (36.9 °C)]   Pulse:  [54-78]   Resp:  [15-20]   BP: ()/(52-68)   SpO2:  [92 %-100 %] .   Home meds for hypertension were reviewed and noted below.   Hypertension Medications               lisinopriL (PRINIVIL,ZESTRIL) 40 MG tablet Take 1 tablet (40 mg total) by mouth once daily.    metoprolol succinate (TOPROL-XL) 100 MG 24 hr tablet Take 1 tablet (100 mg total) by mouth 2 (two) times daily.    NIFEdipine (PROCARDIA-XL) 30 MG (OSM) 24 hr tablet Take 30 mg by mouth once daily.            While in the hospital, will manage blood pressure as follows; Adjust home antihypertensive regimen as follows- hold nifedipine and lisinopril.  We will also decrease dose of metoprolol to allow for gentle diuresis    Will utilize p.r.n. blood pressure medication only if patient's blood pressure greater than 180/110 and she develops symptoms such as worsening chest pain or shortness of breath.

## 2024-09-17 NOTE — PLAN OF CARE
Problem: Stroke: Ischemic (Transient/Permanent)  Goal: Neurological status is maintained/restored to status at baseline  Description: Monitor neurological and mental status including symptoms of increasing intracranial pressure (headache, nausea/vomiting, or change in behavior). Hypertension (greater than 180 systolic) may also indicate a change in status related to stroke.  Outcome: Monitoring/Evaluating progress  Goal: Normal temperature is maintained  Outcome: Monitoring/Evaluating progress  Goal: Elimination status is maintained/returned to baseline  Description: Remove indwelling urinary catheter as soon as possible or collaborate with provider for order/reason for continued use.   Outcome: Monitoring/Evaluating progress  Goal: #Depressive s/s (self-reported/observed) are recognized and monitored  Outcome: Monitoring/Evaluating progress  Goal: Personal stroke risk factors are identified with initial plan for risk reduction  Description: Stroke risk reduction involves taking medication, changing diet, increasing physical exercise, smoking cessation, or alcohol/drug use reduction/cessation based on identified risks.  Outcome: Monitoring/Evaluating progress  Goal: Verbalizes understanding of condition and treatment plan  Description: Document on Patient Education Activity  Outcome: Monitoring/Evaluating progress      Pt to return to inpt room via stretcher w/ transporter.

## 2024-09-17 NOTE — H&P
Inpatient Radiology Pre-procedure Note    History of Present Illness:  Vicky Alvarado is a 59 y.o. female who presents for US guided paracentesis.     Admission H&P reviewed.  Past Medical History:   Diagnosis Date    Anticoagulant long-term use     Arthritis     Atrial fibrillation     cardioversion    Atrial fibrillation with RVR     HAS WATCHMAN IN PLACE NOW    Avascular necrosis     L hand    Cellulitis of extremity 05/07/2022    CHF (congestive heart failure)     Chronic fatigue     Cirrhosis of liver with ascites     Depression     Diabetes mellitus     Encounter for blood transfusion 07/22/2020    Encounter for blood transfusion 03/2022    Fatty liver     GERD (gastroesophageal reflux disease)     Hx of psychiatric care     Hypertension     Iron deficiency anemia 05/07/2022    TIBC 444 with saturated iron 8    Left leg cellulitis 05/07/2022    Liver disease     Obese     Pre-diabetes 05/07/2022    Psychiatric problem     Sleep apnea     wears cpap    SOB (shortness of breath) on exertion     Weight loss     75lb intentional weight loss     Past Surgical History:   Procedure Laterality Date    ABLATION OF ARRHYTHMOGENIC FOCUS FOR ATRIAL FIBRILLATION N/A 07/20/2020    Procedure: Ablation atrial fibrillation;  Surgeon: Gabriel Hawley MD;  Location: [facility] EP LAB;  Service: Cardiology;  Laterality: N/A;  afib, PVI, RFA, REENA, SHADY, anes, MB, 3 Prep    ABLATION OF ARRHYTHMOGENIC FOCUS FOR ATRIAL FIBRILLATION N/A 01/24/2022    Procedure: Ablation atrial fibrillation;  Surgeon: Gabriel Hawley MD;  Location: [facility] EP LAB;  Service: Cardiology;  Laterality: N/A;  afib/afl, PVI (re-do)/CTI, RFA, REENA (cx if SR), SHADY, anes, MB, 3 Prep    APPLICATION OF WOUND VACUUM-ASSISTED CLOSURE DEVICE Left 08/03/2020    Procedure: APPLICATION, WOUND VAC;  Surgeon: SEMAJ Márquez III, MD;  Location: [facility] OR [address] [place] [address];  Service: Peripheral Vascular;  Laterality: Left;    APPLICATION OF WOUND VACUUM-ASSISTED CLOSURE DEVICE  Left 08/06/2020    Procedure: APPLICATION, WOUND VAC;  Surgeon: SEMAJ Márquez III, MD;  Location: [facility] OR 2ND FLR;  Service: Peripheral Vascular;  Laterality: Left;    CARPAL TUNNEL RELEASE Right 06/10/2020    Procedure: RELEASE, CARPAL TUNNEL - RIGHT;  Surgeon: Adelaida Hall MD;  Location: [facility] OR;  Service: Orthopedics;  Laterality: Right;  GENERAL AND REGIONAL    CARPAL TUNNEL RELEASE Left 05/05/2021    Procedure: RELEASE, CARPAL TUNNEL, LEFT;  Surgeon: Adelaida Hall MD;  Location: [facility] OR;  Service: Orthopedics;  Laterality: Left;  GENERAL & REGIONAL IN PACU    CARPECTOMY Left 09/06/2023    Procedure: CARPECTOMY;  Surgeon: Adelaida Hall MD;  Location: [facility] OR;  Service: Orthopedics;  Laterality: Left;  MAC/REGIONAL  LEFT PROXIMAL ROW    CLOSURE OF WOUND Left 08/06/2020    Procedure: CLOSURE, WOUND;  Surgeon: SEMAJ Márquez III, MD;  Location: [facility] OR 2ND FLR;  Service: Peripheral Vascular;  Laterality: Left;  Complex    COLONOSCOPY N/A 08/17/2021    Procedure: COLONOSCOPY Suprep;  Surgeon: Loco Deluca MD;  Location: [facility];  Service: Endoscopy;  Laterality: N/A;    ECHOCARDIOGRAM,TRANSESOPHAGEAL N/A 07/24/2023    Procedure: Transesophageal echo (REENA) intra-procedure log documentation;  Surgeon: Leyla Diagnostic Provider;  Location: [facility] EP LAB;  Service: Cardiology;  Laterality: N/A;  s/p WM, REENA, anes, 3 Prep    ESOPHAGOGASTRODUODENOSCOPY N/A 08/17/2021    Procedure: EGD (ESOPHAGOGASTRODUODENOSCOPY);  Surgeon: Loco Deluca MD;  Location: [facility] ENDO;  Service: Endoscopy;  Laterality: N/A;  Patient is schedule to have her Covid test on 08/14/2021 at the [facility] @ 9:30am. [place].    EXPLORATION OF FEMORAL ARTERY Left 07/21/2020    Procedure: EXPLORATION, ARTERY, FEMORAL;  Surgeon: SEMAJ Márquez III, MD;  Location: [facility] OR 2ND FLR;  Service: Peripheral Vascular;  Laterality: Left;  1. Urgent Direct repair, L SFA branch laceration    FOOT SURGERY       HYSTEROSCOPY WITH DILATION AND CURETTAGE OF UTERUS N/A 02/19/2022    Procedure: HYSTEROSCOPY, WITH DILATION AND CURETTAGE OF UTERUS;  Surgeon: Shane Palomo MD;  [address];  Service: OB/GYN;  Laterality: N/A;    INCISION AND DRAINAGE OF KNEE Left 05/12/2022    Procedure: INCISION AND DRAINAGE, Prepatellar bursectomy.;  Surgeon: Dre Guzmán MD;  [address];  Service: Orthopedics;  Laterality: Left;    LEFT HEART CATHETERIZATION Left 02/07/2023    Procedure: Left heart cath;  Surgeon: Josiah Terry MD;  Location: [facility] CATH LAB;  Service: Cardiology;  Laterality: Left;  borderline moderate bleeding risk 6.3%    OCCLUSION OF LEFT ATRIAL APPENDAGE N/A 06/12/2023    Procedure: Left atrial appendage occlusion;  Surgeon: Gabriel Hawley MD;  Location: [facility] EP LAB;  Service: Cardiology;  Laterality: N/A;  afib, watchman, BSCI, demi, ALIYA ibarra, 3prep    RELEASE OF ULNAR NERVE AT CUBITAL TUNNEL Bilateral     RIGHT HEART CATHETERIZATION Right 08/05/2024    Procedure: INSERTION, CATHETER, RIGHT HEART;  Surgeon: Zane Liu MD;  Location: [facility] CATH LAB;  Service: Cardiology;  Laterality: Right;    ROBOT-ASSISTED LAPAROSCOPIC ABDOMINAL HYSTERECTOMY USING DA KEIRY XI N/A 08/09/2022    Procedure: XI ROBOTIC HYSTERECTOMY;  Surgeon: Yolanda Barriga MD;  Location: [facility];  Service: OB/GYN;  Laterality: N/A;  dual console requested    ROBOT-ASSISTED LAPAROSCOPIC SALPINGO-OOPHORECTOMY Bilateral 08/09/2022    Procedure: ROBOTIC SALPINGO-OOPHORECTOMY;  Surgeon: Yolanda Barriga MD;  Location: [facility];  Service: OB/GYN;  Laterality: Bilateral;    TRANSESOPHAGEAL ECHOCARDIOGRAPHY N/A 06/12/2023    Procedure: ECHOCARDIOGRAM, TRANSESOPHAGEAL;  Surgeon: Cyril Mast MD;  Location: [facility] EP LAB;  Service: Cardiology;  Laterality: N/A;    TREATMENT OF CARDIAC ARRHYTHMIA N/A 01/29/2020    Procedure: CARDIOVERSION;  Surgeon: Gabriel Hawley MD;  Location: [facility] EP LAB;  Service:  Cardiology;  Laterality: N/A;  af, demi, dccv, anes, mb, 345    TREATMENT OF CARDIAC ARRHYTHMIA  01/24/2022    Procedure: Cardioversion or Defibrillation;  Surgeon: Gabriel Hawley MD;  Location: [facility] EP LAB;  Service: Cardiology;;    WOUND DEBRIDEMENT Left 08/06/2020    Procedure: DEBRIDEMENT, WOUND;  Surgeon: SEMAJ Márquez III, MD;  Location: [facility] OR [address] [place] [address];  Service: Peripheral Vascular;  Laterality: Left;       Review of Systems:   As documented in primary team H&P    Home Meds:   Prior to Admission medications    Medication Sig Start Date End Date Taking? Authorizing Provider   albuterol (VENTOLIN HFA) 90 mcg/actuation inhaler Inhale 2 puffs into the lungs every 6 (six) hours as needed for Wheezing. Rescue 12/14/23 12/13/24 Yes Gordy Cordero MD   ALPRAZolam (XANAX) 0.5 MG tablet Take 1 tablet (0.5 mg total) by mouth 2 (two) times daily as needed for Anxiety. 9/4/24  Yes Gordy Cordero MD   DULoxetine (CYMBALTA) 60 MG capsule Take 1 capsule (60 mg total) by mouth once daily. 8/12/24  Yes Brennon Nunez MD   lactulose (CHRONULAC) 10 gram/15 mL solution Take 15 mLs (10 g total) by mouth 2 (two) times daily. Please ensure you are having at least 2-3 bowel movements every day. 8/12/24  Yes Brennon Nunez MD   lisinopriL (PRINIVIL,ZESTRIL) 40 MG tablet Take 1 tablet (40 mg total) by mouth once daily. 6/27/24 6/27/25 Yes Gordy Cordero MD   metoprolol succinate (TOPROL-XL) 100 MG 24 hr tablet Take 1 tablet (100 mg total) by mouth 2 (two) times daily. 1/17/24  Yes Marah Hunter NP   multivitamin (THERAGRAN) per tablet Take 1 tablet by mouth once daily.   Yes Provider, [place]   nystatin (MYCOSTATIN) powder Apply topically 2 (two) times daily. 9/4/24  Yes Gordy Cordero MD   omeprazole (PRILOSEC) 20 MG capsule TAKE 1 CAPSULE BY MOUTH ONCE DAILY 7/25/24  Yes Gordy Cordero MD   ondansetron (ZOFRAN-ODT) 4 MG TbDL Take 2 tablets (8 mg total) by mouth every 8 (eight) hours as needed  (nasuea, vomiting). 8/23/24  Yes Gordy Cordero MD   b complex vitamins capsule Take 1 capsule by mouth every other day.    Provider, Historical   NIFEdipine (PROCARDIA-XL) 30 MG (OSM) 24 hr tablet Take 30 mg by mouth once daily. 9/12/24   Provider, Historical   medroxyPROGESTERone (PROVERA) 10 MG tablet Take 2 tablets (20 mg total) by mouth 3 (three) times daily. 7/13/22 8/9/22  Yolanda Barriga MD     Scheduled Meds:    DULoxetine  60 mg Oral Daily    ferrous sulfate  1 tablet Oral Daily    [START ON 9/18/2024] furosemide  20 mg Oral Daily    lactulose  10 g Oral BID    metoprolol succinate  75 mg Oral BID    miconazole NITRATE 2 %   Topical (Top) BID    midodrine  15 mg Oral TID    pantoprazole  40 mg Oral Daily    [START ON 9/18/2024] spironolactone  50 mg Oral Daily     Continuous Infusions:   PRN Meds:  Current Facility-Administered Medications:     acetaminophen, 325 mg, Oral, Q6H PRN    albuterol, 2 puff, Inhalation, Q6H PRN    ALPRAZolam, 0.5 mg, Oral, BID PRN    dextrose 10%, 12.5 g, Intravenous, PRN    dextrose 10%, 25 g, Intravenous, PRN    methocarbamoL, 500 mg, Oral, QID PRN    ondansetron, 8 mg, Oral, Q8H PRN    oxyCODONE, 5 mg, Oral, Q8H PRN    traZODone, 100 mg, Oral, Nightly PRN  Anticoagulants/Antiplatelets: no anticoagulation    Allergies:   Review of patient's allergies indicates:   Allergen Reactions    Flecainide Shortness Of Breath and Swelling    Shellfish containing products Other (See Comments)     Makes gout terrible    Vancomycin analogues Hives, Itching and Rash     Sedation Hx: have not been any systemic reactions    Vitals:  Temp: 97.8 °F (36.6 °C) (09/17/24 1155)  Pulse: (!) 58 (09/17/24 0910)  Resp: 19 (09/17/24 1155)  BP: (!) 97/52 (09/17/24 1155)  SpO2: (!) 92 % (09/17/24 0910)     Physical Exam:  ASA: 3  Mallampati: n/a    General: no acute distress  Mental Status: alert and oriented to person, place and time  HEENT: normocephalic, atraumatic  Chest: unlabored  breathing  Heart: regular heart rate  Abdomen: distended  Extremity: moves all extremities    Plan: US guided paracentesis  Sedation Plan: Local    Shannen Callahan PA-C  Interventional Radiology  [phone]

## 2024-09-17 NOTE — ASSESSMENT & PLAN NOTE
Patient with known Cirrhosis with Child's class Calculator for Child's score link- https://www.Stockpile.com/calc/340/child-tilley-score-cirrhosis-mortality#creator-insights. Co-morbidities are present and inclusive of ascites, portal hypertension, and hepatic encephalopathy.  MELD-Na score calculated; MELD 3.0: 15 at 9/17/2024 11:27 AM  MELD-Na: 6 at 9/17/2024 11:27 AM  Calculated from:  Serum Creatinine: 1.0 mg/dL at 9/17/2024 11:27 AM  Serum Sodium: 130 mmol/L at 9/17/2024 11:27 AM  Total Bilirubin: 0.4 mg/dL (Using min of 1 mg/dL) at 9/17/2024 11:27 AM  Serum Albumin: 2.5 g/dL at 9/17/2024 11:27 AM  INR(ratio): 1.0 at 9/17/2024  3:58 AM  Age at listing (hypothetical): 59 years  Sex: Female at 9/17/2024 11:27 AM      Continue chronic meds. Etiology likely NAFLD. Will avoid any hepatotoxic meds, and monitor CBC/CMP/INR for synthetic function.   Continue home lactulose. Start furosemide and spironolactone at lowest doses. Titrate doses up.     9/12 6.5L paracentesis, negative for paracentesis.  Repeat paracentesis on 9/16 with 5.5L removed. We will arrange for outpatient paracentesis

## 2024-09-17 NOTE — PLAN OF CARE
Problem: Adult Inpatient Plan of Care  Goal: Absence of Hospital-Acquired Illness or Injury  Outcome: Progressing  Intervention: Identify and Manage Fall Risk  Flowsheets (Taken 9/17/2024 1618)  Safety Promotion/Fall Prevention:   assistive device/personal item within reach   lighting adjusted   medications reviewed   side rails raised x 2  Plan of care was reviewed with the pt. AAOx4. VSS. Pain management was reviewed with the pt. PRN pain meds were given. Intake and output was documented. Para was done. No major changes throughout the day.  Possible discharge tomorrow. Safety precautions were in placed.

## 2024-09-17 NOTE — PT/OT/SLP PROGRESS
"Physical Therapy Treatment    Patient Name:  Vicky Alvarado   MRN:  [id number]    Recommendations:     Discharge Recommendations: Low Intensity Therapy  Discharge Equipment Recommendations: none  Barriers to discharge: None    Assessment:     Vicky Alvarado is a 59 y.o. female admitted with a medical diagnosis of Cirrhosis of liver with ascites.  She presents with the following impairments/functional limitations: weakness, impaired endurance, impaired self care skills, impaired functional mobility, gait instability, impaired balance, decreased upper extremity function, decreased lower extremity function, decreased safety awareness. Patient is progressing well with therapy. This date she required increased time however only light assistance for bed mobility and transfers. Patient feels she has adequate support and equipment at home to discharge with low intensity post acute therapy when medically ready. Overall patient is making progress toward goals and remains motivated.      Rehab Prognosis: Good; patient would benefit from acute skilled PT services to address these deficits and reach maximum level of function.    Recent Surgery: * No surgery found *      Plan:     During this hospitalization, patient to be seen 4 x/week to address the identified rehab impairments via gait training, therapeutic activities, therapeutic exercises, neuromuscular re-education, wheelchair management/training and progress toward the following goals:    Plan of Care Expires:  10/13/24    Subjective     Chief Complaint: wrist pain  Patient/Family Comments/goals: "I don't like the wipes, they sting!"   Pain/Comfort:  Pain Rating 1: 0/10  Location - Side 1: Bilateral  Location - Orientation 1: generalized  Location 1: wrist  Pain Addressed 1: Reposition, Distraction  Pain Rating Post-Intervention 1: 0/10      Objective:     Communicated with RN prior to session.  Patient found HOB elevated with peripheral IV upon PT entry to " room.     General Precautions: Standard, fall  Orthopedic Precautions: N/A  Braces: N/A  Respiratory Status: Room air     Functional Mobility:  Bed Mobility:     Rolling Right: contact guard assistance  Scooting: stand by assistance  Supine to Sit: contact guard assistance  Increased time, Verbal and tactile cues for rolling technique   Transfers:     Sit to Stand:    Bed: moderate assistance with rolling walker  Chair: CGA with 2ww and with no AD  Toilet: CGA with grab bars and 2ww  Gait: patient ambulated 3x20' with SBA and 2ww  Deviations Noted: decreased gait speed, walker far from MITZY at times with stooped trunk posture    Balance: fair      AM-PAC 6 CLICK MOBILITY  Turning over in bed (including adjusting bedclothes, sheets and blankets)?: 4  Sitting down on and standing up from a chair with arms (e.g., wheelchair, bedside commode, etc.): 3  Moving from lying on back to sitting on the side of the bed?: 4  Moving to and from a bed to a chair (including a wheelchair)?: 3  Need to walk in hospital room?: 3  Climbing 3-5 steps with a railing?: 2  Basic Mobility Total Score: 19       Treatment & Education:  STS x4 chair: Verbal and tactile cues with assist during STS transfer for optimal MITZY prior to transfer, forward weight shift to initiate, to engage LE mm, extend hips forward and bring head and chest up to achieve full upright stance.    Verbal and tactile cues provided during mobility for walker management and safety including hand placement on walker vs chair during transfers and positioning of walker in relation to body.    Patient educated on importance of continued mobility with between session exercises given by therapist, sitting up in chair daily when able; emphasis on decreasing risk of deconditioning during hospital stay and improving overall function and wellbeing.    CGA for self cares and dressing following toileting.     Patient left up in chair with all lines intact, call button in reach, and MD  present.    GOALS:   Multidisciplinary Problems       Physical Therapy Goals          Problem: Physical Therapy    Goal Priority Disciplines Outcome Goal Variances Interventions   Physical Therapy Goal     PT, PT/OT Progressing     Description: Goals to be met by: 24     Patient will increase functional independence with mobility by performin. Supine to sit with [place]  2. Sit to supine with [place]  3. Sit to stand transfer with Stand-by Assistance  4. Bed to chair transfer with Stand-by Assistance using Rolling Walker  5. Gait  x 50 feet with Stand-by Assistance using Rolling Walker.   6. Ascend/Descend 4 inch curb step with Stand-by Assistance using Rolling Walker.  7. Lower extremity exercise program x15 reps per handout, with assistance as needed                         Time Tracking:     PT Received On: 24  PT Start Time: 1102     PT Stop Time: 1127  PT Total Time (min): 25 min     Billable Minutes: Therapeutic Activity 10 minutes and Therapeutic Exercise 15 minutes    Treatment Type: Treatment  PT/PTA: PT     Number of PTA visits since last PT visit: 0     2024

## 2024-09-17 NOTE — PLAN OF CARE
Patient stable, AOX4, on CPAP at night, VSS. Safety measures ensured.call light within reach.bed in low position. No distress at night.
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
## 2024-09-17 NOTE — PT/OT/SLP PROGRESS
Occupational Therapy      Patient Name:  Vicky Alvarado   MRN:  [id number]    Patient not seen today secondary to  (pt in paracentesis at time of session attempt (1353).  OT unable to return later in day to re-attempt.). Will follow-up as appropriate.    9/17/2024

## 2024-09-17 NOTE — ASSESSMENT & PLAN NOTE
Hyponatremia is likely due to Cirrhosis. The patient's most recent sodium results are listed below.  Recent Labs     09/15/24  1041 09/17/24  1127   * 130*       Plan  - Correct the sodium by 4-6mEq in 24 hours.   - Will treat the hyponatremia with Fluid restriction of:  1.5 liter per day  - Monitor sodium Daily.   - Patient hyponatremia is stable

## 2024-09-17 NOTE — PLAN OF CARE
Pt arrived to [facility] room 2 via stretcher w/ transporter. AALION x4. Name//allergies/procedure verified. Pt will be monitored yb RN throughout procedure.

## 2024-09-17 NOTE — PLAN OF CARE
Problem: Physical Therapy  Goal: Physical Therapy Goal  Description: Goals to be met by: 24     Patient will increase functional independence with mobility by performin. Supine to sit with [place]  2. Sit to supine with [place]  3. Sit to stand transfer with Stand-by Assistance  4. Bed to chair transfer with Stand-by Assistance using Rolling Walker  5. Gait  x 50 feet with Stand-by Assistance using Rolling Walker.   6. Ascend/Descend 4 inch curb step with Stand-by Assistance using Rolling Walker.  7. Lower extremity exercise program x15 reps per handout, with assistance as needed    Outcome: Progressing

## 2024-09-17 NOTE — SUBJECTIVE & OBJECTIVE
Interval History:   Blood pressures were low this morning.  Decrease dose of metoprolol and diuretics.  Appreciate Physical therapy recommendations and we will arrange for home health with physical therapy.    Paracentesis with 5.5 L removed today.      Review of Systems   Constitutional:  Negative for chills and fever.   Gastrointestinal:  Negative for nausea and vomiting.   Neurological:  Negative for seizures and syncope.     Objective:     Vital Signs (Most Recent):  Temp: 97.8 °F (36.6 °C) (09/17/24 1540)  Pulse: 78 (09/17/24 1540)  Resp: 18 (09/17/24 1540)  BP: 104/66 (09/17/24 1540)  SpO2: 98 % (09/17/24 1540) Vital Signs (24h Range):  Temp:  [97.8 °F (36.6 °C)-98.4 °F (36.9 °C)] 97.8 °F (36.6 °C)  Pulse:  [54-78] 78  Resp:  [15-20] 18  SpO2:  [92 %-100 %] 98 %  BP: ()/(52-68) 104/66     Weight: 79.3 kg (174 lb 13.2 oz)  Body mass index is 30.97 kg/m².    Intake/Output Summary (Last 24 hours) at 9/17/2024 1618  Last data filed at 9/17/2024 1503  Gross per 24 hour   Intake 440 ml   Output --   Net 440 ml           Physical Exam  Vitals and nursing note reviewed.   Constitutional:       General: She is not in acute distress.     Appearance: She is well-developed. She is obese. She is not diaphoretic.      Interventions: She is not intubated.  Eyes:      Extraocular Movements: Extraocular movements intact.   Cardiovascular:      Rate and Rhythm: Tachycardia present. Rhythm irregular.   Pulmonary:      Effort: Pulmonary effort is normal. No accessory muscle usage or respiratory distress. She is not intubated.   Abdominal:      General: There is distension.   Skin:     Capillary Refill: Capillary refill takes less than 2 seconds.   Neurological:      Mental Status: She is alert and oriented to person, place, and time. Mental status is at baseline.      Motor: No seizure activity.   Psychiatric:         Attention and Perception: Attention normal.         Mood and Affect: Mood and affect normal.          Behavior: Behavior is cooperative.             Significant Labs: All pertinent labs within the past 24 hours have been reviewed.    Recent Labs   Lab 09/17/24  1127   CALCIUM 9.4   ALBUMIN 2.5*   PROT 6.2   *   K 4.1   CO2 18*      BUN 18   CREATININE 1.0   ALKPHOS 205*   ALT 5*   AST 16   BILITOT 0.4         Recent Labs   Lab 09/17/24  0358   WBC 4.29   RBC 2.77*   HGB 8.2*   HCT 25.2*      MCV 91   MCH 29.6   MCHC 32.5         Significant Imaging: I have reviewed all pertinent imaging results/findings within the past 24 hours.

## 2024-09-17 NOTE — PLAN OF CARE
Postprocedure report called to primary nurse, Christi. Pt to return to inpt room via stretcher w/ transporter.

## 2024-09-18 VITALS
TEMPERATURE: 99 F | BODY MASS INDEX: 30.97 KG/M2 | HEART RATE: 68 BPM | OXYGEN SATURATION: 99 % | HEIGHT: 63 IN | WEIGHT: 174.81 LBS | RESPIRATION RATE: 16 BRPM | SYSTOLIC BLOOD PRESSURE: 102 MMHG | DIASTOLIC BLOOD PRESSURE: 67 MMHG

## 2024-09-18 LAB
ALBUMIN SERPL BCP-MCNC: 2.9 G/DL (ref 3.5–5.2)
ALP SERPL-CCNC: 209 U/L (ref 55–135)
ALT SERPL W/O P-5'-P-CCNC: 5 U/L (ref 10–44)
ANION GAP SERPL CALC-SCNC: 7 MMOL/L (ref 8–16)
AST SERPL-CCNC: 21 U/L (ref 10–40)
BASOPHILS # BLD AUTO: 0.05 K/UL (ref 0–0.2)
BASOPHILS NFR BLD: 1.3 % (ref 0–1.9)
BILIRUB SERPL-MCNC: 0.5 MG/DL (ref 0.1–1)
BUN SERPL-MCNC: 12 MG/DL (ref 6–20)
CALCIUM SERPL-MCNC: 9.5 MG/DL (ref 8.7–10.5)
CHLORIDE SERPL-SCNC: 104 MMOL/L (ref 95–110)
CO2 SERPL-SCNC: 20 MMOL/L (ref 23–29)
CREAT SERPL-MCNC: 1 MG/DL (ref 0.5–1.4)
DIFFERENTIAL METHOD BLD: ABNORMAL
EOSINOPHIL # BLD AUTO: 0.1 K/UL (ref 0–0.5)
EOSINOPHIL NFR BLD: 2.6 % (ref 0–8)
ERYTHROCYTE [DISTWIDTH] IN BLOOD BY AUTOMATED COUNT: 17.3 % (ref 11.5–14.5)
EST. GFR  (NO RACE VARIABLE): >60 ML/MIN/1.73 M^2
GLUCOSE SERPL-MCNC: 94 MG/DL (ref 70–110)
HCT VFR BLD AUTO: 28.2 % (ref 37–48.5)
HGB BLD-MCNC: 8.9 G/DL (ref 12–16)
IMM GRANULOCYTES # BLD AUTO: 0.02 K/UL (ref 0–0.04)
IMM GRANULOCYTES NFR BLD AUTO: 0.5 % (ref 0–0.5)
INR PPP: 1 (ref 0.8–1.2)
LYMPHOCYTES # BLD AUTO: 0.8 K/UL (ref 1–4.8)
LYMPHOCYTES NFR BLD: 21.1 % (ref 18–48)
MAGNESIUM SERPL-MCNC: 1.7 MG/DL (ref 1.6–2.6)
MCH RBC QN AUTO: 28.7 PG (ref 27–31)
MCHC RBC AUTO-ENTMCNC: 31.6 G/DL (ref 32–36)
MCV RBC AUTO: 91 FL (ref 82–98)
MONOCYTES # BLD AUTO: 0.6 K/UL (ref 0.3–1)
MONOCYTES NFR BLD: 16.4 % (ref 4–15)
NEUTROPHILS # BLD AUTO: 2.2 K/UL (ref 1.8–7.7)
NEUTROPHILS NFR BLD: 58.1 % (ref 38–73)
NRBC BLD-RTO: 0 /100 WBC
PHOSPHATE SERPL-MCNC: 3.6 MG/DL (ref 2.7–4.5)
PLATELET # BLD AUTO: 229 K/UL (ref 150–450)
PMV BLD AUTO: 10.1 FL (ref 9.2–12.9)
POTASSIUM SERPL-SCNC: 4.3 MMOL/L (ref 3.5–5.1)
PROT SERPL-MCNC: 6.5 G/DL (ref 6–8.4)
PROTHROMBIN TIME: 10.5 SEC (ref 9–12.5)
RBC # BLD AUTO: 3.1 M/UL (ref 4–5.4)
SODIUM SERPL-SCNC: 131 MMOL/L (ref 136–145)
WBC # BLD AUTO: 3.83 K/UL (ref 3.9–12.7)

## 2024-09-18 PROCEDURE — 94761 N-INVAS EAR/PLS OXIMETRY MLT: CPT

## 2024-09-18 PROCEDURE — 80053 COMPREHEN METABOLIC PANEL: CPT | Performed by: STUDENT IN AN ORGANIZED HEALTH CARE EDUCATION/TRAINING PROGRAM

## 2024-09-18 PROCEDURE — 84100 ASSAY OF PHOSPHORUS: CPT | Performed by: STUDENT IN AN ORGANIZED HEALTH CARE EDUCATION/TRAINING PROGRAM

## 2024-09-18 PROCEDURE — 25000003 PHARM REV CODE 250: Performed by: HOSPITALIST

## 2024-09-18 PROCEDURE — 36415 COLL VENOUS BLD VENIPUNCTURE: CPT | Performed by: STUDENT IN AN ORGANIZED HEALTH CARE EDUCATION/TRAINING PROGRAM

## 2024-09-18 PROCEDURE — 99900035 HC TECH TIME PER 15 MIN (STAT)

## 2024-09-18 PROCEDURE — 97530 THERAPEUTIC ACTIVITIES: CPT

## 2024-09-18 PROCEDURE — 25000003 PHARM REV CODE 250: Performed by: STUDENT IN AN ORGANIZED HEALTH CARE EDUCATION/TRAINING PROGRAM

## 2024-09-18 PROCEDURE — 85025 COMPLETE CBC W/AUTO DIFF WBC: CPT | Performed by: STUDENT IN AN ORGANIZED HEALTH CARE EDUCATION/TRAINING PROGRAM

## 2024-09-18 PROCEDURE — 83735 ASSAY OF MAGNESIUM: CPT | Performed by: STUDENT IN AN ORGANIZED HEALTH CARE EDUCATION/TRAINING PROGRAM

## 2024-09-18 PROCEDURE — 85610 PROTHROMBIN TIME: CPT | Performed by: STUDENT IN AN ORGANIZED HEALTH CARE EDUCATION/TRAINING PROGRAM

## 2024-09-18 PROCEDURE — 97535 SELF CARE MNGMENT TRAINING: CPT

## 2024-09-18 PROCEDURE — 27100171 HC OXYGEN HIGH FLOW UP TO 24 HOURS

## 2024-09-18 RX ORDER — FERROUS SULFATE 325(65) MG
325 TABLET, DELAYED RELEASE (ENTERIC COATED) ORAL EVERY OTHER DAY
Qty: 15 TABLET | Refills: 2 | Status: SHIPPED | OUTPATIENT
Start: 2024-09-18

## 2024-09-18 RX ORDER — FUROSEMIDE 20 MG/1
20 TABLET ORAL DAILY
Qty: 30 TABLET | Refills: 2 | Status: SHIPPED | OUTPATIENT
Start: 2024-09-19 | End: 2025-09-19

## 2024-09-18 RX ORDER — MIDODRINE HYDROCHLORIDE 5 MG/1
15 TABLET ORAL 3 TIMES DAILY
Qty: 270 TABLET | Refills: 2 | Status: SHIPPED | OUTPATIENT
Start: 2024-09-18 | End: 2024-12-17

## 2024-09-18 RX ORDER — SPIRONOLACTONE 50 MG/1
50 TABLET, FILM COATED ORAL DAILY
Qty: 30 TABLET | Refills: 2 | Status: SHIPPED | OUTPATIENT
Start: 2024-09-19 | End: 2024-12-18

## 2024-09-18 RX ORDER — METOPROLOL SUCCINATE 25 MG/1
75 TABLET, EXTENDED RELEASE ORAL 2 TIMES DAILY
Qty: 180 TABLET | Refills: 2 | Status: SHIPPED | OUTPATIENT
Start: 2024-09-18 | End: 2024-12-17

## 2024-09-18 RX ADMIN — METOPROLOL SUCCINATE 75 MG: 50 TABLET, EXTENDED RELEASE ORAL at 08:09

## 2024-09-18 RX ADMIN — DULOXETINE HYDROCHLORIDE 60 MG: 60 CAPSULE, DELAYED RELEASE ORAL at 08:09

## 2024-09-18 RX ADMIN — FUROSEMIDE 20 MG: 20 TABLET ORAL at 08:09

## 2024-09-18 RX ADMIN — MIDODRINE HYDROCHLORIDE 15 MG: 5 TABLET ORAL at 08:09

## 2024-09-18 RX ADMIN — MIDODRINE HYDROCHLORIDE 15 MG: 5 TABLET ORAL at 03:09

## 2024-09-18 RX ADMIN — SPIRONOLACTONE 50 MG: 25 TABLET ORAL at 08:09

## 2024-09-18 RX ADMIN — PANTOPRAZOLE SODIUM 40 MG: 40 TABLET, DELAYED RELEASE ORAL at 08:09

## 2024-09-18 RX ADMIN — METHOCARBAMOL 500 MG: 500 TABLET ORAL at 04:09

## 2024-09-18 RX ADMIN — MICONAZOLE NITRATE 2 % TOPICAL POWDER: at 08:09

## 2024-09-18 RX ADMIN — FERROUS SULFATE TAB EC 325 MG (65 MG FE EQUIVALENT) 1 EACH: 325 (65 FE) TABLET DELAYED RESPONSE at 08:09

## 2024-09-18 RX ADMIN — OXYCODONE 5 MG: 5 TABLET ORAL at 09:09

## 2024-09-18 RX ADMIN — ALPRAZOLAM 0.5 MG: 0.5 TABLET ORAL at 09:09

## 2024-09-18 NOTE — PLAN OF CARE
Patient remains conscious and alert. Has intermittent pain relieve by current medications. Patient for discharge today and expresses readiness for same.         Patient discharge for home @ 04:50pm. Left unit with relative via wheelchair. Discharge instructions provided. Questions asked and answered.

## 2024-09-18 NOTE — PLAN OF CARE
Problem: Adult Inpatient Plan of Care  Goal: Plan of Care Review  Outcome: Progressing  Goal: Patient-Specific Goal (Individualized)  Outcome: Progressing  Goal: Absence of Hospital-Acquired Illness or Injury  Outcome: Progressing  Goal: Optimal Comfort and Wellbeing  Outcome: Progressing  Goal: Readiness for Transition of Care  Outcome: Progressing     Problem: Diabetes Comorbidity  Goal: Blood Glucose Level Within Targeted Range  Outcome: Progressing     Problem: Wound  Goal: Optimal Coping  Outcome: Progressing  Goal: Optimal Functional Ability  Outcome: Progressing  Goal: Absence of Infection Signs and Symptoms  Outcome: Progressing  Goal: Improved Oral Intake  Outcome: Progressing  Goal: Optimal Pain Control and Function  Outcome: Progressing  Goal: Skin Health and Integrity  Outcome: Progressing  Goal: Optimal Wound Healing  Outcome: Progressing     Problem: Skin Injury Risk Increased  Goal: Skin Health and Integrity  Outcome: ProgressingPatient in bed. No complaints voiced. NADN at this time. Safety measures in place. Call light in reach.

## 2024-09-18 NOTE — PLAN OF CARE
Problem: Occupational Therapy  Goal: Occupational Therapy Goal  Description: Goals to be met by: 09-27-24     Patient will increase functional independence with ADLs by performing:    UE Dressing with Set-up Assistance.  LE Dressing with Set-up Assistance with AE as needed  Grooming while standing at sink with Modified [place].  Toileting from bedside commode with Modified [place] for hygiene and clothing management.   Supine to sit with [place].  Step transfer with Modified [place]  Toilet transfer to bedside commode with Modified [place].  Pt. To be Independent with HEP to improve level of endurance    Outcome: Progressing     Goals remain appropriate

## 2024-09-18 NOTE — PLAN OF CARE
[facility] - Telemetry Stepdown  Discharge Reassessment    Primary Care Provider: Gordy Cordero MD    Expected Discharge Date: 9/18/2024    Patient not medically ready to discharge per MD. Patient blood pressure low this am. Patient was to discharge today home with family and outpatient physical therapy.    Discharge Plan A and Plan B have been determined by review of patient's clinical status, future medical and therapeutic needs, and coverage/benefits for post-acute care in coordination with multidisciplinary team members.      Reassessment (most recent)       Discharge Reassessment - 09/18/24 0819          Discharge Reassessment    Assessment Type Discharge Planning Reassessment     Did the patient's condition or plan change since previous assessment? Yes     Discharge Plan discussed with: Patient     Communicated RAYO with patient/caregiver Date not available/Unable to determine     Discharge Plan A Home with family     Discharge Plan B Home with family     DME Needed Upon Discharge  other (see comments)   TBD    Transition of Care Barriers Underinsured     Why the patient remains in the hospital Requires continued medical care        Post-Acute Status    Discharge Delays None known at this time                   Rita Martinez RN     [phone]

## 2024-09-18 NOTE — PT/OT/SLP PROGRESS
Occupational Therapy   Treatment    Name: Vicky Alvarado  MRN: [id number]  Admitting Diagnosis:  Cirrhosis of liver with ascites       Recommendations:     Discharge Recommendations: Low Intensity Therapy  Discharge Equipment Recommendations:  none  Barriers to discharge:  None    Assessment:     Vicky Alvarado is a 59 y.o. female with a medical diagnosis of Cirrhosis of liver with ascites.  She presents with good participation and motivation.  Pt continues to require (A) with transfers & is at risk for falls. Performance deficits affecting function are weakness, impaired endurance, impaired self care skills, impaired functional mobility, impaired balance, decreased lower extremity function, decreased upper extremity function.     Rehab Prognosis:  Good; patient would benefit from acute skilled OT services to address these deficits and reach maximum level of function.       Plan:     Patient to be seen 4 x/week to address the above listed problems via self-care/home management, therapeutic activities, therapeutic exercises  Plan of Care Expires: 09/27/24  Plan of Care Reviewed with: patient    Subjective     Chief Complaint: none stated  Patient/Family Comments/goals: Pt reported that she will have (A) at home.  Pain/Comfort:  Pain Rating 1: 0/10  Pain Rating Post-Intervention 1: 0/10    Objective:     Communicated with: RN prior to session.  Patient found sitting edge of bed with  (no active lines; no family present) upon OT entry to room.    General Precautions: Standard, fall    Orthopedic Precautions:N/A  Braces: N/A  Respiratory Status: Room air     Occupational Performance:     Functional Mobility/Transfers:  Patient completed Sit <> Stand Transfer with minimum assistance  with  rolling walker   Patient completed Toilet Transfer Step Transfer technique with minimum assistance with  rolling walker  Functional Mobility: CGA using RW with functional mobility in room, to bathroom during ADL's, & in  [facility] for endurance training    Activities of Daily Living:  Grooming: contact guard assistance with washing hands while standing at sink using RW  Upper Body Dressing: stand by assistance donning gown around back while seated EOB  Lower Body Dressing: stand by assistance donning socks seated EOB, Min (A) donning briefs while seated on toilet ((A) to start over feet) and standing in parts  Toileting: contact guard assistance from standard commode & using RW      AMPA 6 Click ADL: 19    Treatment & Education:  Provided education on safety & hand placement during transfers using RW.  SBA-CGA using RW with dynamic standing balance. Pt had no further questions & when asked whether there were any concerns pt reported none.      Patient left up in chair with all lines intact, call button in reach, and RN notified    GOALS:   Multidisciplinary Problems       Occupational Therapy Goals          Problem: Occupational Therapy    Goal Priority Disciplines Outcome Interventions   Occupational Therapy Goal     OT, PT/OT Progressing    Description: Goals to be met by: 09-27-24     Patient will increase functional independence with ADLs by performing:    UE Dressing with Set-up Assistance.  LE Dressing with Set-up Assistance with AE as needed  Grooming while standing at sink with Modified [place].  Toileting from bedside commode with Modified [place] for hygiene and clothing management.   Supine to sit with [place].  Step transfer with Modified [place]  Toilet transfer to bedside commode with Modified [place].  Pt. To be Independent with HEP to improve level of endurance                         Time Tracking:     OT Date of Treatment: 09/18/24  OT Start Time: 1159  OT Stop Time: 1229  OT Total Time (min): 30 min    Billable Minutes:Self Care/Home Management 15  Therapeutic Activity 15    OT/GASPER: OT     Number of GASPER visits since last OT visit: 1 9/18/2024

## 2024-09-18 NOTE — PLAN OF CARE
[facility] - Telemetry Stepdown      HOME HEALTH ORDERS  FACE TO FACE ENCOUNTER    Patient Name: Vicky Alvarado  YOB: 1965    PCP: Gordy Cordero MD   PCP Address: [address] / [place] [address]  PCP Phone Number: [phone]  PCP Fax: [phone]    Encounter Date: 9/10/24    Admit to Home Health    Diagnoses:  Active Hospital Problems    Diagnosis  POA    *Cirrhosis of liver with ascites [K74.60, R18.8]  Yes     Chronic    Debility [R53.81]  Yes    Pain syndrome, chronic [G89.4]  Yes     Chronic    Anxiety [F41.9]  Yes     Chronic    Hyponatremia [E87.1]  Yes    Irritant contact dermatitis due to fecal, urinary or dual incontinence [L24.A2]  Yes    Iron deficiency anemia [D50.9]  Yes     Chronic     TIBC 444 with saturated iron 8      Gastroesophageal reflux disease without esophagitis [K21.9]  Yes     Chronic    Recurrent major depressive disorder, in partial remission [F33.41]  Yes     Chronic    ERENDIRA (obstructive sleep apnea) [G47.33]  Yes     Chronic    Atrial Fibrillation (paroxysmal) [I48.0]  Yes     Chronic    Essential hypertension [I10]  Yes     Chronic      Resolved Hospital Problems   No resolved problems to display.       Follow Up Appointments:  Future Appointments   Date Time Provider Department Center   9/25/2024 10:30 AM NOM IR1-MPU NOM RAD [facility]   10/1/2024 11:00 AM Gordy Cordero MD [facility]   10/2/2024 10:30 AM NOM IR1-MPU NOM RAD [facility]   10/9/2024 10:30 AM [facility] IR1-MPU NOM RAD [facility]   10/11/2024  8:20 AM Harmony Andre [facility] OPTOMTY [place]   10/16/2024 10:30 AM [facility] IR1-MPU [facility] RAD [facility]   10/23/2024 10:30 AM [facility] IR1-MPU [facility] RAD [facility]   10/30/2024 10:30 AM NOM IR1-MPU [facility] RAD [facility]       Allergies:  Review of patient's allergies indicates:   Allergen Reactions    Flecainide Shortness Of Breath and Swelling    Shellfish containing products Other (See Comments)      Makes gout terrible    Vancomycin analogues Hives, Itching and Rash       Medications: Review discharge medications with patient and family and provide education.      I have seen and examined this patient within the last 30 days. My clinical findings that support the need for the home health skilled services and home bound status are the following:no   Weakness/numbness causing balance and gait disturbance due to Liver Disease, Weakness/Debility, and Anemia making it taxing to leave home.  Requiring assistive device to leave home due to unsteady gait caused by  Liver Disease, Weakness/Debility, and Anemia.  Patient with medication mismanagement issues requiring home bound status as evidenced by  Unstable vital signs (blood pressure, heart rate), Poor understanding of medication regimen/dosage, and Poor adherence to medication regimen/dosage.  Medical restrictions requiring assistance of another human to leave home due to  Unstable ambulation and Wound care needs.     Diet:   fluid restriction and 2 gram sodium diet      Referrals/ Consults  Physical Therapy to evaluate and treat. Evaluate for home safety and equipment needs; Establish/upgrade home exercise program. Perform / instruct on therapeutic exercises, gait training, transfer training, and Range of Motion.  Occupational Therapy to evaluate and treat. Evaluate home environment for safety and equipment needs. Perform/Instruct on transfers, ADL training, ROM, and therapeutic exercises.  Aide to provide assistance with personal care, ADLs, and vital signs.    Activities:   activity as tolerated    Nursing:   Agency to admit patient within 24 hours of hospital discharge unless specified on physician order or at patient request    SN to complete comprehensive assessment including routine vital signs. Instruct on disease process and s/s of complications to report to MD. Review/verify medication list sent home with the patient at time of discharge  and instruct  patient/caregiver as needed. Frequency may be adjusted depending on start of care date.     Skilled nurse to perform up to 3 visits PRN for symptoms related to diagnosis    Notify MD if SBP > 160 or < 90; DBP > 90 or < 50; HR > 120 or < 50; Temp > 101; O2 < 88%; Other:       Ok to schedule additional visits based on staff availability and patient request on consecutive days within the home health episode.    Home Health Aide:  Nursing Three times weekly, Physical Therapy Three times weekly, Occupational Therapy Three times weekly, and Home Health Aide Three times weekly    Wound Care Orders  Wound 09/12/24 1515 Moisture associated dermatitis Groin   Wound 09/12/24 1515 Skin Tear Left anterior Arm   Wound 09/12/24 1515 Skin Tear Right anterior;lower Arm     1. Change dressings on bilateral lower arms every 5 days; gently remove old dressings, clean with NSS, cover with a non adherent dressing if wounds remain open. If they are closed, apply moisturizer but no dressing.     2. Clean groin/skin folds twice a day; use soap/water and dry well. Apply antifungal ointment/cream to all reddened areas. Avoid wearing briefs while in the hospital bed. Pt may go back to using antifungal powder per her preference.        I certify that this patient is confined to her home and needs intermittent skilled nursing care, physical therapy, and occupational therapy.

## 2024-09-19 DIAGNOSIS — R18.8 CIRRHOSIS OF LIVER WITH ASCITES, UNSPECIFIED HEPATIC CIRRHOSIS TYPE: Primary | Chronic | ICD-10-CM

## 2024-09-19 DIAGNOSIS — K74.60 CIRRHOSIS OF LIVER WITH ASCITES, UNSPECIFIED HEPATIC CIRRHOSIS TYPE: Primary | Chronic | ICD-10-CM

## 2024-09-20 LAB
BACTERIA SPEC AEROBE CULT: NO GROWTH
BACTERIA SPEC ANAEROBE CULT: NORMAL

## 2024-09-20 NOTE — PLAN OF CARE
[facility] - Telemetry Stepdown  Discharge Final Note    Primary Care Provider: Gordy Cordero MD    Expected Discharge Date: 9/18/2024    Patient discharged 9/18/2024 home with outpatient physical therapy script. Family provided transportation.    Future Appointments   Date Time Provider Department Center   9/25/2024 10:00 AM LAB, APPOINTMENT [facility] INTMED [facility]   9/25/2024 10:30 AM [facility] IR1[facility]MPU [facility] RAD [facility]   10/1/2024 11:00 AM Gordy Cordero MD [facility]   10/2/2024  8:10 AM LAB, APPOINTMENT [facility] INTMED [facility] LAB [facility]   10/2/2024 10:30 AM [facility] IR1-MPU [facility] RAD [facility]   10/7/2024 11:00 AM Noemí Yuan, PT [facility] OP RHB2 [place] 2 fl   10/9/2024  8:10 AM LAB, APPOINTMENT [facility] INTMED [facility] LAB IM [facility]   10/9/2024 10:30 AM [facility] IR1-MPU [facility] RAD [facility]   10/11/2024  8:20 AM Harmony Andre, [facility] OPTOMTY [place]   10/16/2024  8:10 AM LAB, APPOINTMENT [facility] INTMED [facility] LAB IM [facility]   10/16/2024 10:30 AM [facility] IR1-MPU [facility] RAD [facility]   10/23/2024  8:10 AM LAB, APPOINTMENT [facility] INTMED [facility] LAB IM [facility]   10/23/2024 10:30 AM [facility] IR1-MPU [facility] RAD [facility]   10/30/2024  8:10 AM LAB, APPOINTMENT [facility] INTMED [facility] LAB IM [facility]   10/30/2024 10:30 AM [facility] IR1-MPU [facility] RAD [facility]   11/6/2024  8:10 AM LAB, APPOINTMENT [facility] INTMED [facility] LAB IM [facility]   11/12/2024  9:00 AM Kiko Saba MD [facility] HEPAT [facility]         Final Discharge Note (most recent)       Final Note - 09/20/24 0949          Final Note    Assessment Type Final Discharge Note     Anticipated Discharge Disposition Home or Self Care     Hospital Resources/Appts/Education Provided Appointments scheduled and added to AVS        Post-Acute Status    Discharge Delays None known at this time                     Important Message from Medicare             Contact Info       Medicaid Transport    [employer] transportation  [phone]        Next Steps: Follow up    Instructions: Call [phone] for transportation to [facility]. Must call 3 business days in advance to schedule rides.    Gordy Cordero MD   Specialty: Internal Medicine   Relationship: PCP - General    [address] [facility]  [address]  [facility] [address]   Phone: [phone]       Next Steps: Follow up on 10/1/2024    Instructions: Established pt's hospital f/u visit @ 11:00am. Please bring discharge summary, ID, insurance card, and medication list.            Rita Martinez RN     [phone]

## 2024-09-23 NOTE — ASSESSMENT & PLAN NOTE
Patient with known Cirrhosis with Child's class Calculator for Child's score link- https://www.Shoptagr.com/calc/340/child-tilley-score-cirrhosis-mortality#creator-insights. Co-morbidities are present and inclusive of ascites, portal hypertension, and hepatic encephalopathy.  MELD-Na score calculated; MELD 3.0: 13 at 9/18/2024  2:14 PM  MELD-Na: 6 at 9/18/2024  2:14 PM  Calculated from:  Serum Creatinine: 1.0 mg/dL at 9/18/2024  2:14 PM  Serum Sodium: 131 mmol/L at 9/18/2024  2:14 PM  Total Bilirubin: 0.5 mg/dL (Using min of 1 mg/dL) at 9/18/2024  2:14 PM  Serum Albumin: 2.9 g/dL at 9/18/2024  2:14 PM  INR(ratio): 1.0 at 9/18/2024  6:13 AM  Age at listing (hypothetical): 59 years  Sex: Female at 9/18/2024  2:14 PM      Continue chronic meds. Etiology likely NAFLD.   Continue home lactulose.   Start furosemide and spironolactone at lowest doses. Titrate doses up. Continue midodrine to allow for diuretics tolerance.     9/12 6.5L paracentesis, negative for paracentesis.  Repeat paracentesis on 9/16 with 5.5L removed. We will arrange for outpatient paracentesis
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
## 2024-09-23 NOTE — ASSESSMENT & PLAN NOTE
She reports her blood pressures to be chronically relatively low.  Chronic, controlled. Latest blood pressure and vitals reviewed-      .   Home meds for hypertension were reviewed and noted below.   Hypertension Medications               lisinopriL (PRINIVIL,ZESTRIL) 40 MG tablet Take 1 tablet (40 mg total) by mouth once daily.    metoprolol succinate (TOPROL-XL) 100 MG 24 hr tablet Take 1 tablet (100 mg total) by mouth 2 (two) times daily.    NIFEdipine (PROCARDIA-XL) 30 MG (OSM) 24 hr tablet Take 30 mg by mouth once daily.            While in the hospital, will manage blood pressure as follows; Adjust home antihypertensive regimen as follows- hold nifedipine and lisinopril.  We will also decrease dose of metoprolol to allow for gentle diuresis    Will utilize p.r.n. blood pressure medication only if patient's blood pressure greater than 180/110 and she develops symptoms such as worsening chest pain or shortness of breath.

## 2024-09-23 NOTE — ASSESSMENT & PLAN NOTE
Continue home metoprolol. Decreased dose to 75mg BID to allow for diuretics, will recommend close monitoring with cardiologist  She has a Watchman device.

## 2024-09-23 NOTE — SUBJECTIVE & OBJECTIVE
"Interval History:   Blood pressures were low this morning.  Decrease dose of metoprolol and diuretics.  Appreciate Physical therapy recommendations and we will arrange for home health with physical therapy.    Paracentesis with 5.5 L removed today.      Review of Systems   Constitutional:  Negative for chills and fever.   Gastrointestinal:  Negative for nausea and vomiting.   Neurological:  Negative for seizures and syncope.     Objective:     Vital Signs (Most Recent):  Temp: 98.6 °F (37 °C) (09/18/24 1137)  Pulse: 68 (09/18/24 1451)  Resp: 16 (09/18/24 1451)  BP: 102/67 (09/18/24 1451)  SpO2: 99 % (09/18/24 1451) Vital Signs (24h Range):        Weight: 79.3 kg (174 lb 13.2 oz)  Body mass index is 30.97 kg/m².  No intake or output data in the 24 hours ending 09/22/24 1951          Physical Exam  Vitals and nursing note reviewed.   Constitutional:       General: She is not in acute distress.     Appearance: She is well-developed. She is obese. She is not diaphoretic.      Interventions: She is not intubated.  Eyes:      Extraocular Movements: Extraocular movements intact.   Cardiovascular:      Rate and Rhythm: Normal rate. Rhythm irregular.   Pulmonary:      Effort: Pulmonary effort is normal. No accessory muscle usage or respiratory distress. She is not intubated.   Abdominal:      General: There is distension.   Skin:     Capillary Refill: Capillary refill takes less than 2 seconds.   Neurological:      Mental Status: She is alert and oriented to person, place, and time. Mental status is at baseline.      Motor: No seizure activity.   Psychiatric:         Attention and Perception: Attention normal.         Mood and Affect: Mood and affect normal.         Behavior: Behavior is cooperative.             Significant Labs: All pertinent labs within the past 24 hours have been reviewed.    No results for input(s): "GLUCOSE", "CALCIUM", "ALBUMIN", "PROT", "NA", "K", "CO2", "CL", "BUN", "CREATININE", "ALKPHOS", "ALT", " ""AST", "BILITOT" in the last 24 hours.        No results for input(s): "WBC", "RBC", "HGB", "HCT", "PLT", "MCV", "MCH", "MCHC" in the last 24 hours.        Significant Imaging: I have reviewed all pertinent imaging results/findings within the past 24 hours.  "

## 2024-09-24 LAB — BACTERIA SPEC ANAEROBE CULT: NORMAL

## 2024-09-25 ENCOUNTER — HOSPITAL ENCOUNTER (OUTPATIENT)
Dept: INTERVENTIONAL RADIOLOGY/VASCULAR | Facility: HOSPITAL | Age: 59
Discharge: HOME | End: 2024-09-25
Attending: INTERNAL MEDICINE
Payer: MEDICAID

## 2024-09-25 VITALS
RESPIRATION RATE: 18 BRPM | SYSTOLIC BLOOD PRESSURE: 130 MMHG | OXYGEN SATURATION: 100 % | HEART RATE: 57 BPM | DIASTOLIC BLOOD PRESSURE: 79 MMHG

## 2024-09-25 DIAGNOSIS — K74.69 OTHER CIRRHOSIS OF LIVER: Chronic | ICD-10-CM

## 2024-09-25 PROCEDURE — C1769 GUIDE WIRE: HCPCS

## 2024-09-25 PROCEDURE — C1729 CATH, DRAINAGE: HCPCS

## 2024-09-25 PROCEDURE — 25000003 PHARM REV CODE 250: Performed by: FAMILY MEDICINE

## 2024-09-25 RX ORDER — LIDOCAINE HYDROCHLORIDE 10 MG/ML
INJECTION, SOLUTION INFILTRATION; PERINEURAL
Status: COMPLETED | OUTPATIENT
Start: 2024-09-25 | End: 2024-09-25

## 2024-09-25 RX ADMIN — LIDOCAINE HYDROCHLORIDE 10 ML: 10 INJECTION, SOLUTION INFILTRATION; PERINEURAL at 11:09

## 2024-09-25 NOTE — H&P
Radiology History & Physical      SUBJECTIVE:     Chief Complaint: abdominal distention    History of Present Illness:  Vicky Alvarado is a 59 y.o. female who presents for ultrasound guided paracentesis  Past Medical History:   Diagnosis Date    Anticoagulant long-term use     Arthritis     Atrial fibrillation     cardioversion    Atrial fibrillation with RVR     HAS WATCHMAN IN PLACE NOW    Avascular necrosis     L hand    Cellulitis of extremity 05/07/2022    CHF (congestive heart failure)     Chronic fatigue     Cirrhosis of liver with ascites     Depression     Diabetes mellitus     Encounter for blood transfusion 07/22/2020    Encounter for blood transfusion 03/2022    Fatty liver     GERD (gastroesophageal reflux disease)     Hx of psychiatric care     Hypertension     Iron deficiency anemia 05/07/2022    TIBC 444 with saturated iron 8    Left leg cellulitis 05/07/2022    Liver disease     Obese     Pre-diabetes 05/07/2022    Psychiatric problem     Sleep apnea     wears cpap    SOB (shortness of breath) on exertion     Weight loss     75lb intentional weight loss     Past Surgical History:   Procedure Laterality Date    ABLATION OF ARRHYTHMOGENIC FOCUS FOR ATRIAL FIBRILLATION N/A 07/20/2020    Procedure: Ablation atrial fibrillation;  Surgeon: Gabriel Hawley MD;  Location: [facility] EP LAB;  Service: Cardiology;  Laterality: N/A;  afib, PVI, RFA, REENA, SHADY, anes, MB, 3 Prep    ABLATION OF ARRHYTHMOGENIC FOCUS FOR ATRIAL FIBRILLATION N/A 01/24/2022    Procedure: Ablation atrial fibrillation;  Surgeon: Gabriel Hawley MD;  Location: [facility] EP LAB;  Service: Cardiology;  Laterality: N/A;  afib/afl, PVI (re-do)/CTI, RFA, REENA (cx if SR), SHADY, anes, MB, 3 Prep    APPLICATION OF WOUND VACUUM-ASSISTED CLOSURE DEVICE Left 08/03/2020    Procedure: APPLICATION, WOUND VAC;  Surgeon: SEMAJ Márquez III, MD;  Location: [facility] OR [address] [place] [address];  Service: Peripheral Vascular;  Laterality: Left;    APPLICATION OF WOUND  VACUUM-ASSISTED CLOSURE DEVICE Left 08/06/2020    Procedure: APPLICATION, WOUND VAC;  Surgeon: SEMAJ Márquez III, MD;  Location: [facility] OR 2ND FLR;  Service: Peripheral Vascular;  Laterality: Left;    CARPAL TUNNEL RELEASE Right 06/10/2020    Procedure: RELEASE, CARPAL TUNNEL - RIGHT;  Surgeon: Adelaida Hall MD;  Location: [facility] OR;  Service: Orthopedics;  Laterality: Right;  GENERAL AND REGIONAL    CARPAL TUNNEL RELEASE Left 05/05/2021    Procedure: RELEASE, CARPAL TUNNEL, LEFT;  Surgeon: Adelaida Hall MD;  Location: [facility] OR;  Service: Orthopedics;  Laterality: Left;  GENERAL & REGIONAL IN PACU    CARPECTOMY Left 09/06/2023    Procedure: CARPECTOMY;  Surgeon: Adelaida Hall MD;  Location: [facility] OR;  Service: Orthopedics;  Laterality: Left;  MAC/REGIONAL  LEFT PROXIMAL ROW    CLOSURE OF WOUND Left 08/06/2020    Procedure: CLOSURE, WOUND;  Surgeon: SEMAJ Márquez III, MD;  Location: [facility] OR 2ND FLR;  Service: Peripheral Vascular;  Laterality: Left;  Complex    COLONOSCOPY N/A 08/17/2021    Procedure: COLONOSCOPY Suprep;  Surgeon: Loco Deluca MD;  Location: [facility];  Service: Endoscopy;  Laterality: N/A;    ECHOCARDIOGRAM,TRANSESOPHAGEAL N/A 07/24/2023    Procedure: Transesophageal echo (REENA) intra-procedure log documentation;  Surgeon: Leyla Diagnostic Provider;  Location: [facility] EP LAB;  Service: Cardiology;  Laterality: N/A;  s/p WM, REENA, anes, 3 Prep    ESOPHAGOGASTRODUODENOSCOPY N/A 08/17/2021    Procedure: EGD (ESOPHAGOGASTRODUODENOSCOPY);  Surgeon: Loco Deluca MD;  Location: [facility] ENDO;  Service: Endoscopy;  Laterality: N/A;  Patient is schedule to have her Covid test on 08/14/2021 at the [facility] @ 9:30am. [place].    EXPLORATION OF FEMORAL ARTERY Left 07/21/2020    Procedure: EXPLORATION, ARTERY, FEMORAL;  Surgeon: SEMAJ Márquez III, MD;  Location: [facility] OR 2ND FLR;  Service: Peripheral Vascular;  Laterality: Left;  1. Urgent Direct repair, L SFA branch  laceration    FOOT SURGERY      HYSTEROSCOPY WITH DILATION AND CURETTAGE OF UTERUS N/A 02/19/2022    Procedure: HYSTEROSCOPY, WITH DILATION AND CURETTAGE OF UTERUS;  Surgeon: Shane Palomo MD;  Location: [address];  Service: OB/GYN;  Laterality: N/A;    INCISION AND DRAINAGE OF KNEE Left 05/12/2022    Procedure: INCISION AND DRAINAGE, Prepatellar bursectomy.;  Surgeon: Dre Guzmán MD;  [address];  Service: Orthopedics;  Laterality: Left;    LEFT HEART CATHETERIZATION Left 02/07/2023    Procedure: Left heart cath;  Surgeon: Josiah Terry MD;  Location: [facility] CATH LAB;  Service: Cardiology;  Laterality: Left;  borderline moderate bleeding risk 6.3%    OCCLUSION OF LEFT ATRIAL APPENDAGE N/A 06/12/2023    Procedure: Left atrial appendage occlusion;  Surgeon: Gabriel Hawley MD;  Location: [facility] EP LAB;  Service: Cardiology;  Laterality: N/A;  afib, watchman, BSCI, demi, ALIYA ibarra, 3prep    RELEASE OF ULNAR NERVE AT CUBITAL TUNNEL Bilateral     RIGHT HEART CATHETERIZATION Right 08/05/2024    Procedure: INSERTION, CATHETER, RIGHT HEART;  Surgeon: Zane Liu MD;  Location: [facility] CATH LAB;  Service: Cardiology;  Laterality: Right;    ROBOT-ASSISTED LAPAROSCOPIC ABDOMINAL HYSTERECTOMY USING DA KEIRY XI N/A 08/09/2022    Procedure: XI ROBOTIC HYSTERECTOMY;  Surgeon: Yolanda Barriga MD;  Location: [facility];  Service: OB/GYN;  Laterality: N/A;  dual console requested    ROBOT-ASSISTED LAPAROSCOPIC SALPINGO-OOPHORECTOMY Bilateral 08/09/2022    Procedure: ROBOTIC SALPINGO-OOPHORECTOMY;  Surgeon: Yolanda Barriga MD;  Location: [facility];  Service: OB/GYN;  Laterality: Bilateral;    TRANSESOPHAGEAL ECHOCARDIOGRAPHY N/A 06/12/2023    Procedure: ECHOCARDIOGRAM, TRANSESOPHAGEAL;  Surgeon: Cyril Mast MD;  Location: [facility] EP LAB;  Service: Cardiology;  Laterality: N/A;    TREATMENT OF CARDIAC ARRHYTHMIA N/A 01/29/2020    Procedure: CARDIOVERSION;  Surgeon: Gabriel Hawley MD;  Location:  [facility] EP LAB;  Service: Cardiology;  Laterality: N/A;  af, demi, dccv, anes, mb, 345    TREATMENT OF CARDIAC ARRHYTHMIA  01/24/2022    Procedure: Cardioversion or Defibrillation;  Surgeon: Gabriel Hawley MD;  Location: [facility] EP LAB;  Service: Cardiology;;    WOUND DEBRIDEMENT Left 08/06/2020    Procedure: DEBRIDEMENT, WOUND;  Surgeon: SEMAJ Márquez III, MD;  Location: [facility] OR [address] [place] [address];  Service: Peripheral Vascular;  Laterality: Left;       Home Meds:   Prior to Admission medications    Medication Sig Start Date End Date Taking? Authorizing Provider   albuterol (VENTOLIN HFA) 90 mcg/actuation inhaler Inhale 2 puffs into the lungs every 6 (six) hours as needed for Wheezing. Rescue 12/14/23 12/13/24  Gordy Cordero MD   ALPRAZolam (XANAX) 0.5 MG tablet Take 1 tablet (0.5 mg total) by mouth 2 (two) times daily as needed for Anxiety. 9/4/24   Gordy Cordero MD   b complex vitamins capsule Take 1 capsule by mouth every other day.    Provider, Historical   DULoxetine (CYMBALTA) 60 MG capsule Take 1 capsule (60 mg total) by mouth once daily. 8/12/24   Brennon Nunez MD   ferrous sulfate 325 (65 FE) MG EC tablet Take 1 tablet (325 mg total) by mouth every other day. 9/18/24   Eugene Woodward MD   furosemide (LASIX) 20 MG tablet Take 1 tablet (20 mg total) by mouth once daily. 9/19/24 9/19/25  Eugene Woodward MD   lactulose (CHRONULAC) 10 gram/15 mL solution Take 15 mLs (10 g total) by mouth 2 (two) times daily. Please ensure you are having at least 2-3 bowel movements every day. 8/12/24   Brennon Nunez MD   metoprolol succinate (TOPROL-XL) 25 MG 24 hr tablet Take 3 tablets (75 mg total) by mouth 2 (two) times daily. 9/18/24 12/17/24  Eugene Woodward MD   midodrine (PROAMATINE) 5 MG Tab Take 3 tablets (15 mg total) by mouth 3 (three) times daily. 9/18/24 12/17/24  Eugene Woodward MD   multivitamin (THERAGRAN) per tablet Take 1 tablet by mouth once daily.    Provider, Historical   nystatin (MYCOSTATIN) powder Apply topically  2 (two) times daily. 9/4/24   Gordy Cordero MD   omeprazole (PRILOSEC) 20 MG capsule TAKE 1 CAPSULE BY MOUTH ONCE DAILY 7/25/24   Gordy Cordero MD   ondansetron (ZOFRAN-ODT) 4 MG TbDL Take 2 tablets (8 mg total) by mouth every 8 (eight) hours as needed (nasuea, vomiting). 8/23/24   Gordy Cordero MD   oxyCODONE (OXYCONTIN) 10 mg 12 hr tablet Take 1 tablet (10 mg total) by mouth every 12 (twelve) hours. 9/20/24   Gordy Cordero MD   spironolactone (ALDACTONE) 50 MG tablet Take 1 tablet (50 mg total) by mouth once daily. 9/19/24 12/18/24  Eugene Woodward MD   medroxyPROGESTERone (PROVERA) 10 MG tablet Take 2 tablets (20 mg total) by mouth 3 (three) times daily. 7/13/22 8/9/22  Yolanda Barriga MD     Anticoagulants/Antiplatelets: no anticoagulation    Allergies:   Review of patient's allergies indicates:   Allergen Reactions    Flecainide Shortness Of Breath and Swelling    Shellfish containing products Other (See Comments)     Makes gout terrible    Vancomycin analogues Hives, Itching and Rash     Sedation History:  no adverse reactions    Review of Systems:   Hematological: no known coagulopathies  Respiratory: no shortness of breath  Cardiovascular: no chest pain  Gastrointestinal: no abdominal pain  Genito-Urinary: no dysuria  Musculoskeletal: negative  Neurological: no TIA or stroke symptoms         OBJECTIVE:     Vital Signs (Most Recent)  Pulse: (!) 58 (09/25/24 1112)  Resp: 16 (09/25/24 1112)  BP: 137/76 (09/25/24 1112)  SpO2: 100 % (09/25/24 1112)    Physical Exam:  ASA: 2  Mallampati: n/a    General: no acute distress  Mental Status: alert and oriented to person, place and time  HEENT: normocephalic, atraumatic  Chest: unlabored breathing  Heart: regular heart rate  Abdomen: distended  Extremity: moves all extremities    ASSESSMENT/PLAN:     Sedation Plan: local  Patient will undergo ultrasound guided paracentesis.    SCOUT Wolfe, FNP  Interventional Radiology  [phone]  clinic

## 2024-09-25 NOTE — PROCEDURES
Radiology Post-Procedure Note    Pre Op Diagnosis: Ascites  Post Op Diagnosis: Same    Procedure: Ultrasound Guided Paracentesis    Procedure performed by: Jannet CUNNINGHAM, Samantha     Written Informed Consent Obtained: Yes  Specimen Removed: YES serous  Estimated Blood Loss: Minimal    Findings:   Successful LLQ paracentesis.  Albumin administered PRN per protocol.    Patient tolerated procedure well.    Samantha Power, APRN, FNP  Interventional Radiology  [phone] clinic

## 2024-09-26 ENCOUNTER — PATIENT OUTREACH (OUTPATIENT)
Dept: ADMINISTRATIVE | Facility: OTHER | Age: 59
End: 2024-09-26
Payer: MEDICAID

## 2024-09-26 NOTE — PROGRESS NOTES
CHW - Initial Contact    Esperanza Casey,  Community Health Worker reviewed the Social Determinant of Health questionnaire with patient via telephone today.    Pt identified barriers of most importance are: financial difficulties and transportation.  CHW discussed Pt completing an SNAP application.  Pt stated she lives with her sister and will dicussed it with her because the [facility] includes the income of everyone in the household.  CHW referred Pt to [email or web address] for reduced or Mobility Impaired Transportation and provided Pt with an application for [facility] Financial Assistance.    Referrals to community agencies completed with patient/caregiver consent outside of [facility] include: [place] and [facility], [facility], [facility] program   Referrals were put through [facility] - no:   Other information discussed the patient needs / wants help with: None at this time.    Follow-up Outreach - Due: 10/4/2024

## 2024-10-02 ENCOUNTER — HOSPITAL ENCOUNTER (OUTPATIENT)
Dept: INTERVENTIONAL RADIOLOGY/VASCULAR | Facility: HOSPITAL | Age: 59
Discharge: HOME | End: 2024-10-02
Attending: INTERNAL MEDICINE
Payer: MEDICAID

## 2024-10-02 VITALS
RESPIRATION RATE: 15 BRPM | DIASTOLIC BLOOD PRESSURE: 74 MMHG | HEART RATE: 55 BPM | SYSTOLIC BLOOD PRESSURE: 136 MMHG | OXYGEN SATURATION: 99 %

## 2024-10-02 DIAGNOSIS — K74.69 OTHER CIRRHOSIS OF LIVER: Chronic | ICD-10-CM

## 2024-10-02 PROCEDURE — C1729 CATH, DRAINAGE: HCPCS

## 2024-10-02 PROCEDURE — 27100111 IR PARACENTESIS WITH IMAGING

## 2024-10-02 PROCEDURE — 63600175 PHARM REV CODE 636 W HCPCS

## 2024-10-02 RX ORDER — ALBUMIN HUMAN 250 G/1000ML
SOLUTION INTRAVENOUS
Status: DISCONTINUED
Start: 2024-10-02 | End: 2024-10-02 | Stop reason: WASHOUT

## 2024-10-02 RX ORDER — LIDOCAINE HYDROCHLORIDE 10 MG/ML
INJECTION, SOLUTION INFILTRATION; PERINEURAL
Status: COMPLETED | OUTPATIENT
Start: 2024-10-02 | End: 2024-10-02

## 2024-10-02 RX ADMIN — LIDOCAINE HYDROCHLORIDE 8 ML: 10 INJECTION, SOLUTION INFILTRATION; PERINEURAL at 10:10

## 2024-10-02 NOTE — PLAN OF CARE
Paracentesis completed, pt tolerated well. No s/s of complications noted. 3.4 Liters removed from LLQ, mepore applied CDI. Per protocol, pt to receive albumin 100 ml. Multiple unsuccessful IV attempts, notified GAYATRI Pride. Pt refused any further attempts at IV despite education on benefits of albumin, PA aware. Discharge instructions reviewed and acknowledged by patient. Pt discharged via wheelchair, accompanied by family.

## 2024-10-02 NOTE — PROCEDURES
Radiology Post-Procedure Note    Pre Op Diagnosis: Ascites  Post Op Diagnosis: Same    Procedure: Ultrasound Guided Paracentesis    Procedure performed by: Shannen Callahan PA-C    Written Informed Consent Obtained: Yes  Specimen Removed: YES (yellow, clear)  Estimated Blood Loss: Minimal    Findings:   Successful paracentesis from LLQ.  Albumin administered PRN per protocol.    Patient tolerated procedure well.    Shannen Callahan PA-C  Interventional Radiology  [phone]

## 2024-10-02 NOTE — DISCHARGE INSTRUCTIONS
Questions or Concerns   - [phone] ([facility]-F[facility] 8 AM- 5PM).   - Radiology Resident on call [phone] (Weekends and After hours).  - IR Clinic [phone]

## 2024-10-02 NOTE — PLAN OF CARE
Pt arrived to mpu room 2 for paracentesis, no acute distress noted. Orders and labs reviewed on chart. Awaiting consent.

## 2024-10-02 NOTE — H&P
Radiology History & Physical      SUBJECTIVE:     Chief Complaint: Abdominal Distension    History of Present Illness:  Vicky Alvarado is a 59 y.o. female who presents for US guided paracentesis.     Past Medical History:   Diagnosis Date    Anticoagulant long-term use     Arthritis     Atrial fibrillation     cardioversion    Atrial fibrillation with RVR     HAS WATCHMAN IN PLACE NOW    Avascular necrosis     L hand    Cellulitis of extremity 05/07/2022    CHF (congestive heart failure)     Chronic fatigue     Cirrhosis of liver with ascites     Depression     Diabetes mellitus     Encounter for blood transfusion 07/22/2020    Encounter for blood transfusion 03/2022    Fatty liver     GERD (gastroesophageal reflux disease)     Hx of psychiatric care     Hypertension     Iron deficiency anemia 05/07/2022    TIBC 444 with saturated iron 8    Left leg cellulitis 05/07/2022    Liver disease     Obese     Pre-diabetes 05/07/2022    Psychiatric problem     Sleep apnea     wears cpap    SOB (shortness of breath) on exertion     Weight loss     75lb intentional weight loss     Past Surgical History:   Procedure Laterality Date    ABLATION OF ARRHYTHMOGENIC FOCUS FOR ATRIAL FIBRILLATION N/A 07/20/2020    Procedure: Ablation atrial fibrillation;  Surgeon: Gabriel Hawley MD;  Location: [facility] EP LAB;  Service: Cardiology;  Laterality: N/A;  afib, PVI, RFA, REENA, SHADY, anes, MB, 3 Prep    ABLATION OF ARRHYTHMOGENIC FOCUS FOR ATRIAL FIBRILLATION N/A 01/24/2022    Procedure: Ablation atrial fibrillation;  Surgeon: Gabriel Hawley MD;  Location: [facility] EP LAB;  Service: Cardiology;  Laterality: N/A;  afib/afl, PVI (re-do)/CTI, RFA, REENA (cx if SR), SHADY, anes, MB, 3 Prep    APPLICATION OF WOUND VACUUM-ASSISTED CLOSURE DEVICE Left 08/03/2020    Procedure: APPLICATION, WOUND VAC;  Surgeon: SEMAJ Márquez III, MD;  Location: [facility] OR [address] [place] [address];  Service: Peripheral Vascular;  Laterality: Left;    APPLICATION OF WOUND  VACUUM-ASSISTED CLOSURE DEVICE Left 08/06/2020    Procedure: APPLICATION, WOUND VAC;  Surgeon: SEMAJ Márquez III, MD;  Location: [facility] OR 2ND FLR;  Service: Peripheral Vascular;  Laterality: Left;    CARPAL TUNNEL RELEASE Right 06/10/2020    Procedure: RELEASE, CARPAL TUNNEL - RIGHT;  Surgeon: Adelaida Hall MD;  Location: [facility] OR;  Service: Orthopedics;  Laterality: Right;  GENERAL AND REGIONAL    CARPAL TUNNEL RELEASE Left 05/05/2021    Procedure: RELEASE, CARPAL TUNNEL, LEFT;  Surgeon: Adelaida Hall MD;  Location: [facility] OR;  Service: Orthopedics;  Laterality: Left;  GENERAL & REGIONAL IN PACU    CARPECTOMY Left 09/06/2023    Procedure: CARPECTOMY;  Surgeon: Adelaida Hall MD;  Location: [facility] OR;  Service: Orthopedics;  Laterality: Left;  MAC/REGIONAL  LEFT PROXIMAL ROW    CLOSURE OF WOUND Left 08/06/2020    Procedure: CLOSURE, WOUND;  Surgeon: SEMAJ Márquez III, MD;  Location: [facility] OR 2ND FLR;  Service: Peripheral Vascular;  Laterality: Left;  Complex    COLONOSCOPY N/A 08/17/2021    Procedure: COLONOSCOPY Suprep;  Surgeon: Loco Deluca MD;  Location: [facility];  Service: Endoscopy;  Laterality: N/A;    ECHOCARDIOGRAM,TRANSESOPHAGEAL N/A 07/24/2023    Procedure: Transesophageal echo (REENA) intra-procedure log documentation;  Surgeon: Leyla Diagnostic Provider;  Location: [facility] EP LAB;  Service: Cardiology;  Laterality: N/A;  s/p WM, REENA, anes, 3 Prep    ESOPHAGOGASTRODUODENOSCOPY N/A 08/17/2021    Procedure: EGD (ESOPHAGOGASTRODUODENOSCOPY);  Surgeon: Loco Deluca MD;  Location: [facility] ENDO;  Service: Endoscopy;  Laterality: N/A;  Patient is schedule to have her Covid test on 08/14/2021 at the [facility] @ 9:30am. [place].    EXPLORATION OF FEMORAL ARTERY Left 07/21/2020    Procedure: EXPLORATION, ARTERY, FEMORAL;  Surgeon: SEMAJ Márquez III, MD;  Location: [facility] OR 2ND FLR;  Service: Peripheral Vascular;  Laterality: Left;  1. Urgent Direct repair, L SFA branch  laceration    FOOT SURGERY      HYSTEROSCOPY WITH DILATION AND CURETTAGE OF UTERUS N/A 02/19/2022    Procedure: HYSTEROSCOPY, WITH DILATION AND CURETTAGE OF UTERUS;  Surgeon: Shane Palomo MD;  Location: [address];  Service: OB/GYN;  Laterality: N/A;    INCISION AND DRAINAGE OF KNEE Left 05/12/2022    Procedure: INCISION AND DRAINAGE, Prepatellar bursectomy.;  Surgeon: Dre Guzmán MD;  [address];  Service: Orthopedics;  Laterality: Left;    LEFT HEART CATHETERIZATION Left 02/07/2023    Procedure: Left heart cath;  Surgeon: Josiah Terry MD;  Location: [facility] CATH LAB;  Service: Cardiology;  Laterality: Left;  borderline moderate bleeding risk 6.3%    OCCLUSION OF LEFT ATRIAL APPENDAGE N/A 06/12/2023    Procedure: Left atrial appendage occlusion;  Surgeon: Gabriel Hawley MD;  Location: [facility] EP LAB;  Service: Cardiology;  Laterality: N/A;  afib, watchman, BSCI, demi, ALIYA ibarra, 3prep    RELEASE OF ULNAR NERVE AT CUBITAL TUNNEL Bilateral     RIGHT HEART CATHETERIZATION Right 08/05/2024    Procedure: INSERTION, CATHETER, RIGHT HEART;  Surgeon: Zane Liu MD;  Location: [facility] CATH LAB;  Service: Cardiology;  Laterality: Right;    ROBOT-ASSISTED LAPAROSCOPIC ABDOMINAL HYSTERECTOMY USING DA KEIRY XI N/A 08/09/2022    Procedure: XI ROBOTIC HYSTERECTOMY;  Surgeon: Yolanda Barriga MD;  Location: [facility];  Service: OB/GYN;  Laterality: N/A;  dual console requested    ROBOT-ASSISTED LAPAROSCOPIC SALPINGO-OOPHORECTOMY Bilateral 08/09/2022    Procedure: ROBOTIC SALPINGO-OOPHORECTOMY;  Surgeon: Yolanda Barriga MD;  Location: [facility];  Service: OB/GYN;  Laterality: Bilateral;    TRANSESOPHAGEAL ECHOCARDIOGRAPHY N/A 06/12/2023    Procedure: ECHOCARDIOGRAM, TRANSESOPHAGEAL;  Surgeon: Cyril Mast MD;  Location: [facility] EP LAB;  Service: Cardiology;  Laterality: N/A;    TREATMENT OF CARDIAC ARRHYTHMIA N/A 01/29/2020    Procedure: CARDIOVERSION;  Surgeon: Gabriel Hawley MD;  Location:  [facility] EP LAB;  Service: Cardiology;  Laterality: N/A;  af, demi, dccv, anes, mb, 345    TREATMENT OF CARDIAC ARRHYTHMIA  01/24/2022    Procedure: Cardioversion or Defibrillation;  Surgeon: Gabriel Hawley MD;  Location: [facility] EP LAB;  Service: Cardiology;;    WOUND DEBRIDEMENT Left 08/06/2020    Procedure: DEBRIDEMENT, WOUND;  Surgeon: SEMAJ Márquez III, MD;  Location: [facility] OR [address] [place] [address];  Service: Peripheral Vascular;  Laterality: Left;       Home Meds:   Prior to Admission medications    Medication Sig Start Date End Date Taking? Authorizing Provider   albuterol (VENTOLIN HFA) 90 mcg/actuation inhaler Inhale 2 puffs into the lungs every 6 (six) hours as needed for Wheezing. Rescue 12/14/23 12/13/24  Gordy Cordero MD   ALPRAZolam (XANAX) 0.5 MG tablet Take 1 tablet (0.5 mg total) by mouth 2 (two) times daily as needed for Anxiety. 9/4/24   Gordy Cordero MD   b complex vitamins capsule Take 1 capsule by mouth every other day.    Provider, Historical   DULoxetine (CYMBALTA) 60 MG capsule Take 1 capsule (60 mg total) by mouth once daily. 8/12/24   Brennon Nunez MD   ferrous sulfate 325 (65 FE) MG EC tablet Take 1 tablet (325 mg total) by mouth every other day. 9/18/24   Eugene Woodward MD   furosemide (LASIX) 20 MG tablet Take 1 tablet (20 mg total) by mouth once daily. 9/19/24 9/19/25  Eugene Woodward MD   lactulose (CHRONULAC) 10 gram/15 mL solution Take 15 mLs (10 g total) by mouth 2 (two) times daily. Please ensure you are having at least 2-3 bowel movements every day. 8/12/24   Brennon Nunez MD   metoprolol succinate (TOPROL-XL) 25 MG 24 hr tablet Take 3 tablets (75 mg total) by mouth 2 (two) times daily. 9/18/24 12/17/24  Eugene Woodward MD   midodrine (PROAMATINE) 5 MG Tab Take 3 tablets (15 mg total) by mouth 3 (three) times daily. 9/18/24 12/17/24  Eugene Woodward MD   multivitamin (THERAGRAN) per tablet Take 1 tablet by mouth once daily.    Provider, Historical   nystatin (MYCOSTATIN) powder Apply topically  2 (two) times daily. 9/4/24   Gordy Cordero MD   omeprazole (PRILOSEC) 20 MG capsule TAKE 1 CAPSULE BY MOUTH ONCE DAILY 7/25/24   Gordy Cordero MD   ondansetron (ZOFRAN-ODT) 4 MG TbDL Take 2 tablets (8 mg total) by mouth every 8 (eight) hours as needed (nasuea, vomiting). 8/23/24   Gordy Cordero MD   oxyCODONE (ROXICODONE) 5 MG immediate release tablet Take 1 tablet (5 mg total) by mouth every 12 (twelve) hours as needed for Pain. 10/1/24   Gordy Cordero MD   spironolactone (ALDACTONE) 50 MG tablet Take 1 tablet (50 mg total) by mouth once daily. 9/19/24 12/18/24  Eugene Woodward MD   medroxyPROGESTERone (PROVERA) 10 MG tablet Take 2 tablets (20 mg total) by mouth 3 (three) times daily. 7/13/22 8/9/22  Yolanda Barriga MD     Anticoagulants/Antiplatelets: no anticoagulation    Allergies:   Review of patient's allergies indicates:   Allergen Reactions    Flecainide Shortness Of Breath and Swelling    Shellfish containing products Other (See Comments)     Makes gout terrible    Vancomycin analogues Hives, Itching and Rash     Sedation History:  no adverse reactions    Review of Systems:   Hematological: no known coagulopathies  Respiratory: no shortness of breath  Cardiovascular: no chest pain  Gastrointestinal: no abdominal pain  Genito-Urinary: no dysuria  Musculoskeletal: negative  Neurological: no TIA or stroke symptoms         OBJECTIVE:     Vital Signs (Most Recent)  Pulse: (!) 52 (10/02/24 0950)  Resp: 16 (10/02/24 0950)  BP: (!) 142/67 (10/02/24 0950)  SpO2: 98 % (10/02/24 0950)    Physical Exam:  ASA: 3  Mallampati: n/a    General: no acute distress  Mental Status: alert and oriented to person, place and time  HEENT: normocephalic, atraumatic  Chest: unlabored breathing  Heart: regular heart rate  Abdomen: distended  Extremity: moves all extremities    ASSESSMENT/PLAN:     Sedation Plan: Local  Patient will undergo US guided paracentesis.    Shannen Callahan PA-C  Interventional  Radiology  [phone]

## 2024-10-03 ENCOUNTER — PATIENT OUTREACH (OUTPATIENT)
Dept: ADMINISTRATIVE | Facility: OTHER | Age: 59
End: 2024-10-03
Payer: MEDICAID

## 2024-10-07 ENCOUNTER — CLINICAL SUPPORT (OUTPATIENT)
Dept: REHABILITATION | Facility: HOSPITAL | Age: 59
End: 2024-10-07
Payer: MEDICAID

## 2024-10-07 DIAGNOSIS — R29.898 DECREASED STRENGTH OF UPPER EXTREMITY: ICD-10-CM

## 2024-10-07 DIAGNOSIS — R29.898 DECREASED STRENGTH OF LOWER EXTREMITY: Primary | ICD-10-CM

## 2024-10-07 DIAGNOSIS — R53.81 DEBILITY: ICD-10-CM

## 2024-10-07 DIAGNOSIS — R26.89 DECREASED FUNCTIONAL MOBILITY: ICD-10-CM

## 2024-10-07 DIAGNOSIS — R68.89 DECREASED FUNCTIONAL ACTIVITY TOLERANCE: ICD-10-CM

## 2024-10-07 PROCEDURE — 97162 PT EVAL MOD COMPLEX 30 MIN: CPT

## 2024-10-07 PROCEDURE — 97110 THERAPEUTIC EXERCISES: CPT

## 2024-10-07 NOTE — PLAN OF CARE
"[facility]   Physical Therapy Initial Evaluation      Name: Vicky Alvarado  Clinic Number: [id number]    Therapy Diagnosis:   Encounter Diagnoses   Name Primary?    Debility     Decreased strength of lower extremity Yes    Decreased strength of upper extremity     Decreased functional mobility     Decreased functional activity tolerance         Physician: Sarah Harding PA-C    Physician Orders: PT Eval and Treat   Medical Diagnosis from Referral: R53.81 (ICD-10-CM) - Debility   Evaluation Date: 10/7/2024  Authorization Period Expiration: 9/17/2024  Plan of Care Expiration: 12/20/2024  Progress Note Due: 10th visit  Visit # / Visits authorized: 1/ 1   FOTO: 1/ 3    Precautions: Standard, Diabetes, Fall, and live cirrhosis A fib      Time In: 11: 10 am  Time Out: 12:05 pm  Total Billable Time: 55 minutes    Subjective     Date of onset: chronic    History of current condition - Vicky reports: she has had several medical problems since 2020. Over the last year has noticed she has become "very debilitated," and experiences SOB. Reports her SOB is severe enough that she is unable to tolerate cleaning her house. Patient reports several falls. Patient reports 2-3 months ago she had several falls, and the most recent fall she was unable to get up by herself. Patient reports she was recently diagnosed with cirrhosis and liver failure. Recently hospitalized for 1 month, went to inpatient rehab for 1 month, and has been home for about 1 month.Patient reports she have been dealing with ascitis and has a pericentsis every week to drain fluid from her abdomen.    Falls: several    Imaging: please see imaging    Prior Therapy: inpatient physical therapy  Social History:  lives with her sister  Occupation: previously registered nurse  Prior Level of Function: independent  Current Level of Function: reports that most of the time she can get dressed by herself, but sometime needs help, uses a " shower chair    Pain:  Current 0/10, worst 8/10, best 0/10   Location: bilateral knee    Description: Aching and Burning  Aggravating Factors: WB or positional  Easing Factors: rest and change position    Patients goals: improve fitness     Medical History:   Past Medical History:   Diagnosis Date    Anticoagulant long-term use     Arthritis     Atrial fibrillation     cardioversion    Atrial fibrillation with RVR     HAS WATCHMAN IN PLACE NOW    Avascular necrosis     L hand    Cellulitis of extremity 05/07/2022    CHF (congestive heart failure)     Chronic fatigue     Cirrhosis of liver with ascites     Depression     Diabetes mellitus     Encounter for blood transfusion 07/22/2020    Encounter for blood transfusion 03/2022    Fatty liver     GERD (gastroesophageal reflux disease)     Hx of psychiatric care     Hypertension     Iron deficiency anemia 05/07/2022    TIBC 444 with saturated iron 8    Left leg cellulitis 05/07/2022    Liver disease     Obese     Pre-diabetes 05/07/2022    Psychiatric problem     Sleep apnea     wears cpap    SOB (shortness of breath) on exertion     Weight loss     75lb intentional weight loss       Surgical History:   Vicky Alvarado  has a past surgical history that includes Treatment of cardiac arrhythmia (N/A, 01/29/2020); Foot surgery; Carpal tunnel release (Right, 06/10/2020); Exploration of femoral artery (Left, 07/21/2020); Ablation of arrhythmogenic focus for atrial fibrillation (N/A, 07/20/2020); Application of wound vacuum-assisted closure device (Left, 08/03/2020); Wound debridement (Left, 08/06/2020); Closure of wound (Left, 08/06/2020); Application of wound vacuum-assisted closure device (Left, 08/06/2020); Carpal tunnel release (Left, 05/05/2021); Esophagogastroduodenoscopy (N/A, 08/17/2021); Colonoscopy (N/A, 08/17/2021); Ablation of arrhythmogenic focus for atrial fibrillation (N/A, 01/24/2022); Treatment of cardiac arrhythmia (01/24/2022); Hysteroscopy with  dilation and curettage of uterus (N/A, 02/19/2022); Incision and drainage of knee (Left, 05/12/2022); Robot-assisted laparoscopic abdominal hysterectomy using da Brianne Xi (N/A, 08/09/2022); Robot-assisted laparoscopic salpingo-oophorectomy (Bilateral, 08/09/2022); Left heart catheterization (Left, 02/07/2023); Occlusion of left atrial appendage (N/A, 06/12/2023); Transesophageal echocardiography (N/A, 06/12/2023); echocardiogram,transesophageal (N/A, 07/24/2023); Release of ulnar nerve at cubital tunnel (Bilateral); Carpectomy (Left, 09/06/2023); and Right heart catheterization (Right, 08/05/2024).    Medications:   Vicky has a current medication list which includes the following prescription(s): albuterol, alprazolam, b complex vitamins, duloxetine, ferrous sulfate, furosemide, lactulose, metoprolol succinate, midodrine, multivitamin, nystatin, omeprazole, ondansetron, oxycodone, spironolactone, and [DISCONTINUED] medroxyprogesterone.    Allergies:   Review of patient's allergies indicates:   Allergen Reactions    Flecainide Shortness Of Breath and Swelling    Shellfish containing products Other (See Comments)     Makes gout terrible    Vancomycin analogues Hives, Itching and Rash        Objective       Postural examination/scapula alignment: Rounded shoulder and forward head      Strength: manual muscle test grades below      Lower Extremity Strength  Right LE   Left LE     Hip Flexion: 4-/5 Hip Flexion: 4-/5   Hip ER:  3+/5 Hip ER: 3+/5   Hip IR: 3+/5 Hip IR: 3+/5   Hip Abduction: 3/5 Hip Abduction 3/5   Hip Adduction 3+/5 Hip Adduction 3+/5   Knee Extension: 4+/5 Knee Extension: 4+/5   Knee Flexion: 4+/5 Knee Flexion: 4+/5   Ankle Dorsiflexion: 4+/5 Ankle Dorsiflexion: 4+/5   Ankle Plantarflexion: 4+/5 Ankle Plantarflexion: 4+/5         Special Tests     Balance Assessment:       Evaluation   Single Limb Stance R LE 30 seconds w/ HHA  (<10 sec = HIGH FALL RISK)   Single Limb Stance L LE 30 seconds w/HHA  (<10  sec = HIGH FALL RISK)   Tandem Stance R LE < 15 with HHA   Tandem Stance L LE < 15 with HHA   EO NB < 15 seconds   EC NB < 15 seconds; upper extremity sway         Gait Assessment:  - AD used: Rolator   - Assistance: none  -2 MWT: 290ft  - 6 Minute Walk Test Distance: np  - O2 sats after: 97  96 before  - HR after:78  60 before     GAIT DEVIATIONS:  Pt ambulates with reduced gait speed secondary to fatigue and fear of LOB.     Endurance Assessment:       Evaluation   Timed Up and Go 16 seconds with Rolator    Self Selected Walking Speed  m/sec (based on 2 Minute Walk Test)      30 STS 4x         Table: Population Norms for TUG    Age  Average TUG    60 - 69 years  8.1 seconds    70 - 79 years  9.2 seconds    80 - 99 years  11.3 seconds         Intake Outcome Measure for FOTO MSK Survey    Therapist reviewed FOTO scores for Vicky Alvarado on 10/7/2024.   FOTO report - see Media section or FOTO account episode details.    Intake Score: see media section         Treatment     Total Treatment time (time-based codes) separate from Evaluation: 27 minutes     Vicky received the treatments listed below:    *Per Medicaid guidelines all therapy billed as therex*  *PT one-on-one with patient for entire session with PT extender utilized for remainder of therapy session*      therapeutic activities to improve functional performance for 27  minutes, including:  Sit to stands: 2x10  Lateral walks: 3 laps  Long arc quads: 3x10  Seated Heel raises: 310     Patient Education and Home Exercises     Education provided:   - HEP  - plan of care  -prognosis  -importance of exercise and functional mobility    Written Home Exercises Provided: Yes. Exercises were reviewed and Vicky was able to demonstrate them prior to the end of the session.  Vicky demonstrated good  understanding of the education provided. See EMR under Patient Instructions for exercises provided during therapy sessions.    Assessment     Vicky is a 59 y.o.  female referred to outpatient Physical Therapy with a medical diagnosis of R53.81 (ICD-10-CM) - Debility. Patient demonstrates decreased lower extremity strength, difficulty ambulating short and long distances, poor endurance, balance deficits, reduced activity tolerance and functional mobility. Patient would benefit from skilled outpatient physical therapy to address motor control, strength balance, and endurance deficits so that she may return to full independence with all household and personal ADL's, and improve overall functional mobility, reduce risk for falls and meet all social and recreational needs.      Patient prognosis is Fair.   Patient will benefit from skilled outpatient Physical Therapy to address the deficits stated above and in the chart below, provide patient /family education, and to maximize patientt's level of independence.     Plan of care discussed with patient: Yes  Patient's spiritual, cultural and educational needs considered and patient is agreeable to the plan of care and goals as stated below:     Anticipated Barriers for therapy: chronicity, age, co-morbidities    Medical Necessity is demonstrated by the following  History  Co-morbidities and personal factors that may impact the plan of care [] LOW: no personal factors / co-morbidities  [] MODERATE: 1-2 personal factors / co-morbidities  [x] HIGH: 3+ personal factors / co-morbidities    Moderate / High Support Documentation:   Co-morbidities affecting plan of care: see medical chart    Personal Factors:   age     Examination  Body Structures and Functions, activity limitations and participation restrictions that may impact the plan of care [] LOW: addressing 1-2 elements  [] MODERATE: 3+ elements  [x] HIGH: 4+ elements (please support below)    Moderate / High Support Documentation: Limitations with prolonged walking, difficulty with ADLs, decreased strength, difficulty with gait, neuromuscular re-education, and pain.       Clinical  Presentation [] LOW: stable  [x] MODERATE: Evolving  [] HIGH: Unstable     Decision Making/ Complexity Score: moderate       Goals:  Short Term Goals (6 Weeks):   1. Patient will be independent with home exercise program to supplement physical therapy treatment in improving functional status.  2. Pt will improve impaired lower extremity manual muscle tests to >/= 4-/5 to improve dynamic lower extremity support for closed chain tasks.  3. Patient will perform 3 consecutive hip hinges with less than 3 VC to demonstrate improved lumbopelvic movement for safety during household ADL's and improved lifting techniques.   4. Patient will report decreased pain with activity 3/10 to demonstrate improved tolerance for activity and household ADL's.     Long Term Goals (10 Weeks):   1. Pt will improve impaired lower extremity manual muscle tests to >/= 4/5 to improve dynamic lower extremity support for closed chain tasks.  2. Patient will improve the total FOTO MSK Survey Score to </= 30% limited to demonstrate increased perceived functional mobility.  3. Patient will perform at least 10 sit to stands in 30 seconds without UE support to demonstrate increased functional strength.   4. Patient will perform 3 consecutive hip hinges with less than 0VC to demonstrate improved lumbopelvic movement for safety during household ADL's and improved lifting techniques.   5. Patient will initiate walking program to demonstrate improved activity tolerance.  6. Patient will perform timed up and go with least restrictive assistive device in < 10 seconds to improve gait speed and safety with community ambulation.  7. Patient will perform at least 8-10 sit to stands in 30 seconds without UE support to demonstrate increased functional strength.       Plan     Plan of care Certification: 10/7/2024 to 12/20/2024.    Outpatient Physical Therapy 2 times weekly for 10 weeks to include the following interventions: Aquatic Therapy, Cervical/Lumbar  Traction, Electrical Stimulation TENS, Gait Training, Manual Therapy, Moist Heat/ Ice, Neuromuscular Re-ed, Orthotic Management and Training, Patient Education, Self Care, Therapeutic Activities, Therapeutic Exercise, and functional dry needling  .     Noemí Yuan, PT,DPT, OCS        Physician's Signature: _________________________________________ Date: ________________

## 2024-10-09 ENCOUNTER — HOSPITAL ENCOUNTER (OUTPATIENT)
Dept: INTERVENTIONAL RADIOLOGY/VASCULAR | Facility: HOSPITAL | Age: 59
Discharge: HOME | End: 2024-10-09
Attending: INTERNAL MEDICINE
Payer: MEDICAID

## 2024-10-09 ENCOUNTER — PATIENT OUTREACH (OUTPATIENT)
Dept: ADMINISTRATIVE | Facility: OTHER | Age: 59
End: 2024-10-09
Payer: MEDICAID

## 2024-10-09 ENCOUNTER — TELEPHONE (OUTPATIENT)
Dept: OPTOMETRY | Facility: CLINIC | Age: 59
End: 2024-10-09
Payer: MEDICAID

## 2024-10-09 VITALS
RESPIRATION RATE: 17 BRPM | OXYGEN SATURATION: 97 % | HEART RATE: 57 BPM | SYSTOLIC BLOOD PRESSURE: 106 MMHG | DIASTOLIC BLOOD PRESSURE: 69 MMHG

## 2024-10-09 DIAGNOSIS — K74.69 OTHER CIRRHOSIS OF LIVER: Chronic | ICD-10-CM

## 2024-10-09 PROBLEM — R29.898 DECREASED STRENGTH OF LOWER EXTREMITY: Status: ACTIVE | Noted: 2024-10-09

## 2024-10-09 PROBLEM — R29.898 DECREASED STRENGTH OF UPPER EXTREMITY: Status: ACTIVE | Noted: 2024-10-09

## 2024-10-09 PROBLEM — R26.89 DECREASED FUNCTIONAL MOBILITY: Status: ACTIVE | Noted: 2024-10-09

## 2024-10-09 PROBLEM — R68.89 DECREASED FUNCTIONAL ACTIVITY TOLERANCE: Status: ACTIVE | Noted: 2024-10-09

## 2024-10-09 PROCEDURE — C1729 CATH, DRAINAGE: HCPCS

## 2024-10-09 PROCEDURE — 49083 ABD PARACENTESIS W/IMAGING: CPT | Performed by: STUDENT IN AN ORGANIZED HEALTH CARE EDUCATION/TRAINING PROGRAM

## 2024-10-09 PROCEDURE — C1894 INTRO/SHEATH, NON-LASER: HCPCS

## 2024-10-09 PROCEDURE — 27200940 IR PARACENTESIS WITH IMAGING

## 2024-10-09 PROCEDURE — C1769 GUIDE WIRE: HCPCS

## 2024-10-09 PROCEDURE — 49083 ABD PARACENTESIS W/IMAGING: CPT | Mod: ,,, | Performed by: FAMILY MEDICINE

## 2024-10-09 NOTE — PLAN OF CARE
Paracentesis done. Removed 3200 ml. No albumin administered per algorithm. provided. Patient AAOx3, no distress noted, respirations even and unlabored, VSS. LLQ site clean, dry, and intact; no bleeding or hematoma noted. Personal belongings returned to patient. Discharge instructions reviewed with patient; patient accepting of education provided. Patient to be wheeled to [employer] to meet family for ride home. Patient stable for transport.

## 2024-10-09 NOTE — PLAN OF CARE
Patient AAOx3, no distress noted, respirations even and unlabored, will continue to monitor. VSS. Acceptance of education, consents signed, H/P done. Labs reviewed. Patient in [facility] [place] [facility] for paracentesis procedure. Warm blankets applied to patient. Patient prepped and draped in sterile fashion.

## 2024-10-09 NOTE — PROCEDURES
Radiology Post-Procedure Note    Pre Op Diagnosis: Ascites  Post Op Diagnosis: Same    Procedure: Ultrasound Guided Paracentesis    Procedure performed by: Jannet CUNNINGHAM, Samantha     Written Informed Consent Obtained: Yes  Specimen Removed: YES serous  Estimated Blood Loss: Minimal    Findings:   Successful LLQ paracentesis.  Albumin administered PRN per protocol.    Patient tolerated procedure well.    Samantha Power, APRN, FNP  Interventional Radiology  [phone] clinic

## 2024-10-09 NOTE — H&P
Radiology History & Physical      SUBJECTIVE:     Chief Complaint: abdominal distention    History of Present Illness:  Vicky Alvarado is a 59 y.o. female who presents for ultrasound guided paracentesis  Past Medical History:   Diagnosis Date    Anticoagulant long-term use     Arthritis     Atrial fibrillation     cardioversion    Atrial fibrillation with RVR     HAS WATCHMAN IN PLACE NOW    Avascular necrosis     L hand    Cellulitis of extremity 05/07/2022    CHF (congestive heart failure)     Chronic fatigue     Cirrhosis of liver with ascites     Depression     Diabetes mellitus     Encounter for blood transfusion 07/22/2020    Encounter for blood transfusion 03/2022    Fatty liver     GERD (gastroesophageal reflux disease)     Hx of psychiatric care     Hypertension     Iron deficiency anemia 05/07/2022    TIBC 444 with saturated iron 8    Left leg cellulitis 05/07/2022    Liver disease     Obese     Pre-diabetes 05/07/2022    Psychiatric problem     Sleep apnea     wears cpap    SOB (shortness of breath) on exertion     Weight loss     75lb intentional weight loss     Past Surgical History:   Procedure Laterality Date    ABLATION OF ARRHYTHMOGENIC FOCUS FOR ATRIAL FIBRILLATION N/A 07/20/2020    Procedure: Ablation atrial fibrillation;  Surgeon: Gabriel Hawley MD;  Location: [facility] EP LAB;  Service: Cardiology;  Laterality: N/A;  afib, PVI, RFA, REENA, SHADY, anes, MB, 3 Prep    ABLATION OF ARRHYTHMOGENIC FOCUS FOR ATRIAL FIBRILLATION N/A 01/24/2022    Procedure: Ablation atrial fibrillation;  Surgeon: Gabriel Hawley MD;  Location: [facility] EP LAB;  Service: Cardiology;  Laterality: N/A;  afib/afl, PVI (re-do)/CTI, RFA, REENA (cx if SR), SHADY, anes, MB, 3 Prep    APPLICATION OF WOUND VACUUM-ASSISTED CLOSURE DEVICE Left 08/03/2020    Procedure: APPLICATION, WOUND VAC;  Surgeon: SEMAJ Márquez III, MD;  Location: [facility] OR [address] [place] [address];  Service: Peripheral Vascular;  Laterality: Left;    APPLICATION OF WOUND  VACUUM-ASSISTED CLOSURE DEVICE Left 08/06/2020    Procedure: APPLICATION, WOUND VAC;  Surgeon: SEMAJ Márquez III, MD;  Location: [facility] OR 2ND FLR;  Service: Peripheral Vascular;  Laterality: Left;    CARPAL TUNNEL RELEASE Right 06/10/2020    Procedure: RELEASE, CARPAL TUNNEL - RIGHT;  Surgeon: Adelaida Hall MD;  Location: [facility] OR;  Service: Orthopedics;  Laterality: Right;  GENERAL AND REGIONAL    CARPAL TUNNEL RELEASE Left 05/05/2021    Procedure: RELEASE, CARPAL TUNNEL, LEFT;  Surgeon: Adelaida Hall MD;  Location: [facility] OR;  Service: Orthopedics;  Laterality: Left;  GENERAL & REGIONAL IN PACU    CARPECTOMY Left 09/06/2023    Procedure: CARPECTOMY;  Surgeon: Adelaida Hall MD;  Location: [facility] OR;  Service: Orthopedics;  Laterality: Left;  MAC/REGIONAL  LEFT PROXIMAL ROW    CLOSURE OF WOUND Left 08/06/2020    Procedure: CLOSURE, WOUND;  Surgeon: SEMAJ Márquez III, MD;  Location: [facility] OR 2ND FLR;  Service: Peripheral Vascular;  Laterality: Left;  Complex    COLONOSCOPY N/A 08/17/2021    Procedure: COLONOSCOPY Suprep;  Surgeon: Loco Deluca MD;  Location: [facility];  Service: Endoscopy;  Laterality: N/A;    ECHOCARDIOGRAM,TRANSESOPHAGEAL N/A 07/24/2023    Procedure: Transesophageal echo (REENA) intra-procedure log documentation;  Surgeon: Leyla Diagnostic Provider;  Location: [facility] EP LAB;  Service: Cardiology;  Laterality: N/A;  s/p WM, REENA, anes, 3 Prep    ESOPHAGOGASTRODUODENOSCOPY N/A 08/17/2021    Procedure: EGD (ESOPHAGOGASTRODUODENOSCOPY);  Surgeon: Loco Deluca MD;  Location: [facility] ENDO;  Service: Endoscopy;  Laterality: N/A;  Patient is schedule to have her Covid test on 08/14/2021 at the [facility] @ 9:30am. [place].    EXPLORATION OF FEMORAL ARTERY Left 07/21/2020    Procedure: EXPLORATION, ARTERY, FEMORAL;  Surgeon: SEMAJ Márquez III, MD;  Location: [facility] OR 2ND FLR;  Service: Peripheral Vascular;  Laterality: Left;  1. Urgent Direct repair, L SFA branch  laceration    FOOT SURGERY      HYSTEROSCOPY WITH DILATION AND CURETTAGE OF UTERUS N/A 02/19/2022    Procedure: HYSTEROSCOPY, WITH DILATION AND CURETTAGE OF UTERUS;  Surgeon: Shane Palomo MD;  Location: [address];  Service: OB/GYN;  Laterality: N/A;    INCISION AND DRAINAGE OF KNEE Left 05/12/2022    Procedure: INCISION AND DRAINAGE, Prepatellar bursectomy.;  Surgeon: Dre Guzmán MD;  [address];  Service: Orthopedics;  Laterality: Left;    LEFT HEART CATHETERIZATION Left 02/07/2023    Procedure: Left heart cath;  Surgeon: Josiah Terry MD;  Location: [facility] CATH LAB;  Service: Cardiology;  Laterality: Left;  borderline moderate bleeding risk 6.3%    OCCLUSION OF LEFT ATRIAL APPENDAGE N/A 06/12/2023    Procedure: Left atrial appendage occlusion;  Surgeon: Gabriel Hawley MD;  Location: [facility] EP LAB;  Service: Cardiology;  Laterality: N/A;  afib, watchman, BSCI, demi, ALIYA ibarra, 3prep    RELEASE OF ULNAR NERVE AT CUBITAL TUNNEL Bilateral     RIGHT HEART CATHETERIZATION Right 08/05/2024    Procedure: INSERTION, CATHETER, RIGHT HEART;  Surgeon: Zane Liu MD;  Location: [facility] CATH LAB;  Service: Cardiology;  Laterality: Right;    ROBOT-ASSISTED LAPAROSCOPIC ABDOMINAL HYSTERECTOMY USING DA KEIRY XI N/A 08/09/2022    Procedure: XI ROBOTIC HYSTERECTOMY;  Surgeon: Yolanda Barriga MD;  Location: [facility];  Service: OB/GYN;  Laterality: N/A;  dual console requested    ROBOT-ASSISTED LAPAROSCOPIC SALPINGO-OOPHORECTOMY Bilateral 08/09/2022    Procedure: ROBOTIC SALPINGO-OOPHORECTOMY;  Surgeon: Yolanda Barriga MD;  Location: [facility];  Service: OB/GYN;  Laterality: Bilateral;    TRANSESOPHAGEAL ECHOCARDIOGRAPHY N/A 06/12/2023    Procedure: ECHOCARDIOGRAM, TRANSESOPHAGEAL;  Surgeon: Cyril Mast MD;  Location: [facility] EP LAB;  Service: Cardiology;  Laterality: N/A;    TREATMENT OF CARDIAC ARRHYTHMIA N/A 01/29/2020    Procedure: CARDIOVERSION;  Surgeon: Gabriel Hawley MD;  Location:  [facility] EP LAB;  Service: Cardiology;  Laterality: N/A;  af, demi, dccv, anes, mb, 345    TREATMENT OF CARDIAC ARRHYTHMIA  01/24/2022    Procedure: Cardioversion or Defibrillation;  Surgeon: Gabriel Hawley MD;  Location: [facility] EP LAB;  Service: Cardiology;;    WOUND DEBRIDEMENT Left 08/06/2020    Procedure: DEBRIDEMENT, WOUND;  Surgeon: SEMAJ Márquez III, MD;  Location: [facility] OR [address] [place] [address];  Service: Peripheral Vascular;  Laterality: Left;       Home Meds:   Prior to Admission medications    Medication Sig Start Date End Date Taking? Authorizing Provider   albuterol (VENTOLIN HFA) 90 mcg/actuation inhaler Inhale 2 puffs into the lungs every 6 (six) hours as needed for Wheezing. Rescue 12/14/23 12/13/24  Gordy Cordero MD   ALPRAZolam (XANAX) 0.5 MG tablet Take 1 tablet (0.5 mg total) by mouth 2 (two) times daily as needed for Anxiety. 9/4/24   Gordy Cordero MD   b complex vitamins capsule Take 1 capsule by mouth every other day.    Provider, Historical   DULoxetine (CYMBALTA) 60 MG capsule Take 1 capsule (60 mg total) by mouth once daily. 8/12/24   Brennon Nunez MD   ferrous sulfate 325 (65 FE) MG EC tablet Take 1 tablet (325 mg total) by mouth every other day. 9/18/24   Eugene Woodward MD   furosemide (LASIX) 20 MG tablet Take 1 tablet (20 mg total) by mouth once daily. 9/19/24 9/19/25  Eugene Woodward MD   lactulose (CHRONULAC) 10 gram/15 mL solution Take 15 mLs (10 g total) by mouth 2 (two) times daily. Please ensure you are having at least 2-3 bowel movements every day. 8/12/24   Brennon Nunez MD   metoprolol succinate (TOPROL-XL) 25 MG 24 hr tablet Take 3 tablets (75 mg total) by mouth 2 (two) times daily. 9/18/24 12/17/24  Eugene Woodward MD   midodrine (PROAMATINE) 5 MG Tab Take 3 tablets (15 mg total) by mouth 3 (three) times daily. 9/18/24 12/17/24  Eugene Woodward MD   multivitamin (THERAGRAN) per tablet Take 1 tablet by mouth once daily.    Provider, Historical   nystatin (MYCOSTATIN) powder Apply topically  2 (two) times daily. 9/4/24   Gordy Cordero MD   omeprazole (PRILOSEC) 20 MG capsule TAKE 1 CAPSULE BY MOUTH ONCE DAILY 7/25/24   Gordy Cordero MD   ondansetron (ZOFRAN-ODT) 4 MG TbDL Take 2 tablets (8 mg total) by mouth every 8 (eight) hours as needed (nasuea, vomiting). 8/23/24   Gordy Cordero MD   oxyCODONE (ROXICODONE) 5 MG immediate release tablet Take 1 tablet (5 mg total) by mouth every 12 (twelve) hours as needed for Pain. 10/1/24   Gordy Cordero MD   spironolactone (ALDACTONE) 50 MG tablet Take 1 tablet (50 mg total) by mouth once daily. 9/19/24 12/18/24  Eugene Woodward MD   medroxyPROGESTERone (PROVERA) 10 MG tablet Take 2 tablets (20 mg total) by mouth 3 (three) times daily. 7/13/22 8/9/22  Yolanda Barriga MD     Anticoagulants/Antiplatelets: no anticoagulation    Allergies:   Review of patient's allergies indicates:   Allergen Reactions    Flecainide Shortness Of Breath and Swelling    Shellfish containing products Other (See Comments)     Makes gout terrible    Vancomycin analogues Hives, Itching and Rash     Sedation History:  no adverse reactions    Review of Systems:   Hematological: no known coagulopathies  Respiratory: no shortness of breath  Cardiovascular: no chest pain  Gastrointestinal: no abdominal pain  Genito-Urinary: no dysuria  Musculoskeletal: negative  Neurological: no TIA or stroke symptoms         OBJECTIVE:     Vital Signs (Most Recent)  Pulse: 63 (10/09/24 0858)  Resp: 11 (10/09/24 0858)  BP: 121/71 (10/09/24 0858)  SpO2: 100 % (10/09/24 0858)    Physical Exam:  ASA: 2  Mallampati: n/a    General: no acute distress  Mental Status: alert and oriented to person, place and time  HEENT: normocephalic, atraumatic  Chest: unlabored breathing  Heart: regular heart rate  Abdomen: distended  Extremity: moves all extremities    ASSESSMENT/PLAN:     Sedation Plan: local  Patient will undergo ultrasound guided paracentesis.    SCOUT Wolfe, ELINAP  Interventional  Radiology  [phone] clinic

## 2024-10-11 ENCOUNTER — PATIENT OUTREACH (OUTPATIENT)
Dept: ADMINISTRATIVE | Facility: OTHER | Age: 59
End: 2024-10-11
Payer: MEDICAID

## 2024-10-11 NOTE — PROGRESS NOTES
CHW - Case Closure    Esperanza Casey, Community Health Worker spoke to patient via telephone today.   Pt/Caregiver reported: Ms. Alvarado stated that she was approved for Medicaid and she thinks someone called her from [facility] (Medicaid).   Ms. Alvarado denied any additional needs at this time and agrees with episode closure at this time.  CHW provided patient with Community Health Worker's contact information and encouraged her to contact this Community Health Worker if additional needs arise.

## 2024-10-14 ENCOUNTER — CLINICAL SUPPORT (OUTPATIENT)
Dept: REHABILITATION | Facility: HOSPITAL | Age: 59
End: 2024-10-14
Payer: MEDICAID

## 2024-10-14 DIAGNOSIS — R68.89 DECREASED FUNCTIONAL ACTIVITY TOLERANCE: ICD-10-CM

## 2024-10-14 DIAGNOSIS — R29.898 DECREASED STRENGTH OF LOWER EXTREMITY: Primary | ICD-10-CM

## 2024-10-14 DIAGNOSIS — R29.898 DECREASED STRENGTH OF UPPER EXTREMITY: ICD-10-CM

## 2024-10-14 DIAGNOSIS — R26.89 DECREASED FUNCTIONAL MOBILITY: ICD-10-CM

## 2024-10-14 PROCEDURE — 97110 THERAPEUTIC EXERCISES: CPT | Mod: CQ

## 2024-10-16 ENCOUNTER — HOSPITAL ENCOUNTER (OUTPATIENT)
Dept: INTERVENTIONAL RADIOLOGY/VASCULAR | Facility: HOSPITAL | Age: 59
Discharge: HOME | End: 2024-10-16
Attending: INTERNAL MEDICINE
Payer: MEDICAID

## 2024-10-16 VITALS
HEART RATE: 55 BPM | OXYGEN SATURATION: 100 % | DIASTOLIC BLOOD PRESSURE: 73 MMHG | RESPIRATION RATE: 20 BRPM | SYSTOLIC BLOOD PRESSURE: 140 MMHG

## 2024-10-16 DIAGNOSIS — K74.69 OTHER CIRRHOSIS OF LIVER: Chronic | ICD-10-CM

## 2024-10-16 PROCEDURE — 63600175 PHARM REV CODE 636 W HCPCS: Performed by: PHYSICIAN ASSISTANT

## 2024-10-16 PROCEDURE — 49083 ABD PARACENTESIS W/IMAGING: CPT | Performed by: RADIOLOGY

## 2024-10-16 PROCEDURE — 63600175 PHARM REV CODE 636 W HCPCS: Mod: JZ,JG

## 2024-10-16 PROCEDURE — P9047 ALBUMIN (HUMAN), 25%, 50ML: HCPCS | Mod: JZ,JG

## 2024-10-16 PROCEDURE — 49083 ABD PARACENTESIS W/IMAGING: CPT | Mod: ,,, | Performed by: PHYSICIAN ASSISTANT

## 2024-10-16 PROCEDURE — C1729 CATH, DRAINAGE: HCPCS

## 2024-10-16 RX ORDER — LIDOCAINE HYDROCHLORIDE 10 MG/ML
INJECTION, SOLUTION INFILTRATION; PERINEURAL
Status: COMPLETED | OUTPATIENT
Start: 2024-10-16 | End: 2024-10-16

## 2024-10-16 RX ORDER — ALBUMIN HUMAN 250 G/1000ML
SOLUTION INTRAVENOUS
Status: COMPLETED
Start: 2024-10-16 | End: 2024-10-16

## 2024-10-16 RX ADMIN — LIDOCAINE HYDROCHLORIDE 5 ML: 10 INJECTION, SOLUTION INFILTRATION; PERINEURAL at 10:10

## 2024-10-16 RX ADMIN — ALBUMIN (HUMAN) 25 G: 12.5 SOLUTION INTRAVENOUS at 11:10

## 2024-10-16 RX ADMIN — ALBUMIN (HUMAN) 12.5 G: 12.5 SOLUTION INTRAVENOUS at 11:10

## 2024-10-16 NOTE — H&P
Radiology History & Physical      SUBJECTIVE:     Chief Complaint: abdominal distention    History of Present Illness:  Vicky Alvarado is a 59 y.o. female who presents for ultrasound guided paracentesis  Past Medical History:   Diagnosis Date    Anticoagulant long-term use     Arthritis     Atrial fibrillation     cardioversion    Atrial fibrillation with RVR     HAS WATCHMAN IN PLACE NOW    Avascular necrosis     L hand    Cellulitis of extremity 05/07/2022    CHF (congestive heart failure)     Chronic fatigue     Cirrhosis of liver with ascites     Depression     Diabetes mellitus     Encounter for blood transfusion 07/22/2020    Encounter for blood transfusion 03/2022    Fatty liver     GERD (gastroesophageal reflux disease)     Hx of psychiatric care     Hypertension     Iron deficiency anemia 05/07/2022    TIBC 444 with saturated iron 8    Left leg cellulitis 05/07/2022    Liver disease     Obese     Pre-diabetes 05/07/2022    Psychiatric problem     Sleep apnea     wears cpap    SOB (shortness of breath) on exertion     Weight loss     75lb intentional weight loss     Past Surgical History:   Procedure Laterality Date    ABLATION OF ARRHYTHMOGENIC FOCUS FOR ATRIAL FIBRILLATION N/A 07/20/2020    Procedure: Ablation atrial fibrillation;  Surgeon: Gabriel Hawley MD;  Location: [facility] EP LAB;  Service: Cardiology;  Laterality: N/A;  afib, PVI, RFA, REENA, SHDAY, anes, MB, 3 Prep    ABLATION OF ARRHYTHMOGENIC FOCUS FOR ATRIAL FIBRILLATION N/A 01/24/2022    Procedure: Ablation atrial fibrillation;  Surgeon: Gabriel Hawley MD;  Location: [facility] EP LAB;  Service: Cardiology;  Laterality: N/A;  afib/afl, PVI (re-do)/CTI, RFA, REENA (cx if SR), SHADY, anes, MB, 3 Prep    APPLICATION OF WOUND VACUUM-ASSISTED CLOSURE DEVICE Left 08/03/2020    Procedure: APPLICATION, WOUND VAC;  Surgeon: SEMAJ Márquez III, MD;  Location: [facility] OR [address] [place] [address];  Service: Peripheral Vascular;  Laterality: Left;    APPLICATION OF WOUND  VACUUM-ASSISTED CLOSURE DEVICE Left 08/06/2020    Procedure: APPLICATION, WOUND VAC;  Surgeon: SEMAJ Márquez III, MD;  Location: [facility] OR 2ND FLR;  Service: Peripheral Vascular;  Laterality: Left;    CARPAL TUNNEL RELEASE Right 06/10/2020    Procedure: RELEASE, CARPAL TUNNEL - RIGHT;  Surgeon: Adelaida Hall MD;  Location: [facility] OR;  Service: Orthopedics;  Laterality: Right;  GENERAL AND REGIONAL    CARPAL TUNNEL RELEASE Left 05/05/2021    Procedure: RELEASE, CARPAL TUNNEL, LEFT;  Surgeon: Adelaida Hall MD;  Location: [facility] OR;  Service: Orthopedics;  Laterality: Left;  GENERAL & REGIONAL IN PACU    CARPECTOMY Left 09/06/2023    Procedure: CARPECTOMY;  Surgeon: Adelaida Hall MD;  Location: [facility] OR;  Service: Orthopedics;  Laterality: Left;  MAC/REGIONAL  LEFT PROXIMAL ROW    CLOSURE OF WOUND Left 08/06/2020    Procedure: CLOSURE, WOUND;  Surgeon: SEMAJ Márquez III, MD;  Location: [facility] OR 2ND FLR;  Service: Peripheral Vascular;  Laterality: Left;  Complex    COLONOSCOPY N/A 08/17/2021    Procedure: COLONOSCOPY Suprep;  Surgeon: Loco Deluca MD;  Location: [facility];  Service: Endoscopy;  Laterality: N/A;    ECHOCARDIOGRAM,TRANSESOPHAGEAL N/A 07/24/2023    Procedure: Transesophageal echo (REENA) intra-procedure log documentation;  Surgeon: Leyla Diagnostic Provider;  Location: [facility] EP LAB;  Service: Cardiology;  Laterality: N/A;  s/p WM, REENA, anes, 3 Prep    ESOPHAGOGASTRODUODENOSCOPY N/A 08/17/2021    Procedure: EGD (ESOPHAGOGASTRODUODENOSCOPY);  Surgeon: Loco Deluca MD;  Location: [facility] ENDO;  Service: Endoscopy;  Laterality: N/A;  Patient is schedule to have her Covid test on 08/14/2021 at the [facility] @ 9:30am. [place].    EXPLORATION OF FEMORAL ARTERY Left 07/21/2020    Procedure: EXPLORATION, ARTERY, FEMORAL;  Surgeon: SEMAJ Márquez III, MD;  Location: [facility] OR 2ND FLR;  Service: Peripheral Vascular;  Laterality: Left;  1. Urgent Direct repair, L SFA branch  laceration    FOOT SURGERY      HYSTEROSCOPY WITH DILATION AND CURETTAGE OF UTERUS N/A 02/19/2022    Procedure: HYSTEROSCOPY, WITH DILATION AND CURETTAGE OF UTERUS;  Surgeon: Shane Palomo MD;  Location: [address];  Service: OB/GYN;  Laterality: N/A;    INCISION AND DRAINAGE OF KNEE Left 05/12/2022    Procedure: INCISION AND DRAINAGE, Prepatellar bursectomy.;  Surgeon: Dre Guzmán MD;  [address];  Service: Orthopedics;  Laterality: Left;    LEFT HEART CATHETERIZATION Left 02/07/2023    Procedure: Left heart cath;  Surgeon: Josiah Terry MD;  Location: [facility] CATH LAB;  Service: Cardiology;  Laterality: Left;  borderline moderate bleeding risk 6.3%    OCCLUSION OF LEFT ATRIAL APPENDAGE N/A 06/12/2023    Procedure: Left atrial appendage occlusion;  Surgeon: Gabriel Hawley MD;  Location: [facility] EP LAB;  Service: Cardiology;  Laterality: N/A;  afib, watchman, BSCI, demi, ALIYA ibarra, 3prep    RELEASE OF ULNAR NERVE AT CUBITAL TUNNEL Bilateral     RIGHT HEART CATHETERIZATION Right 08/05/2024    Procedure: INSERTION, CATHETER, RIGHT HEART;  Surgeon: Zane Liu MD;  Location: [facility] CATH LAB;  Service: Cardiology;  Laterality: Right;    ROBOT-ASSISTED LAPAROSCOPIC ABDOMINAL HYSTERECTOMY USING DA KEIRY XI N/A 08/09/2022    Procedure: XI ROBOTIC HYSTERECTOMY;  Surgeon: Yolanda Barriga MD;  Location: [facility];  Service: OB/GYN;  Laterality: N/A;  dual console requested    ROBOT-ASSISTED LAPAROSCOPIC SALPINGO-OOPHORECTOMY Bilateral 08/09/2022    Procedure: ROBOTIC SALPINGO-OOPHORECTOMY;  Surgeon: Yolanda Barriga MD;  Location: [facility];  Service: OB/GYN;  Laterality: Bilateral;    TRANSESOPHAGEAL ECHOCARDIOGRAPHY N/A 06/12/2023    Procedure: ECHOCARDIOGRAM, TRANSESOPHAGEAL;  Surgeon: Cyril Mast MD;  Location: [facility] EP LAB;  Service: Cardiology;  Laterality: N/A;    TREATMENT OF CARDIAC ARRHYTHMIA N/A 01/29/2020    Procedure: CARDIOVERSION;  Surgeon: Gabriel Hawley MD;  Location:  [facility] EP LAB;  Service: Cardiology;  Laterality: N/A;  af, demi, dccv, anes, mb, 345    TREATMENT OF CARDIAC ARRHYTHMIA  01/24/2022    Procedure: Cardioversion or Defibrillation;  Surgeon: Gabriel Hawley MD;  Location: [facility] EP LAB;  Service: Cardiology;;    WOUND DEBRIDEMENT Left 08/06/2020    Procedure: DEBRIDEMENT, WOUND;  Surgeon: SEMAJ Márquez III, MD;  Location: [facility] OR [address] [place] [address];  Service: Peripheral Vascular;  Laterality: Left;       Home Meds:   Prior to Admission medications    Medication Sig Start Date End Date Taking? Authorizing Provider   albuterol (VENTOLIN HFA) 90 mcg/actuation inhaler Inhale 2 puffs into the lungs every 6 (six) hours as needed for Wheezing. Rescue 12/14/23 12/13/24  Gordy Cordero MD   ALPRAZolam (XANAX) 0.5 MG tablet Take 1 tablet (0.5 mg total) by mouth 2 (two) times daily as needed for Anxiety. 9/4/24   Gordy Cordero MD   b complex vitamins capsule Take 1 capsule by mouth every other day.    Provider, Historical   DULoxetine (CYMBALTA) 60 MG capsule Take 1 capsule (60 mg total) by mouth once daily. 8/12/24   Brennon Nunez MD   ferrous sulfate 325 (65 FE) MG EC tablet Take 1 tablet (325 mg total) by mouth every other day. 9/18/24   Eugene Woodward MD   furosemide (LASIX) 20 MG tablet Take 1 tablet (20 mg total) by mouth once daily. 9/19/24 9/19/25  Eugene Woodward MD   lactulose (CHRONULAC) 10 gram/15 mL solution Take 15 mLs (10 g total) by mouth 2 (two) times daily. Please ensure you are having at least 2-3 bowel movements every day. 8/12/24   Brennon Nunez MD   metoprolol succinate (TOPROL-XL) 25 MG 24 hr tablet Take 3 tablets (75 mg total) by mouth 2 (two) times daily. 9/18/24 12/17/24  Eugene Woodward MD   midodrine (PROAMATINE) 5 MG Tab Take 3 tablets (15 mg total) by mouth 3 (three) times daily. 9/18/24 12/17/24  Eugene Woodward MD   multivitamin (THERAGRAN) per tablet Take 1 tablet by mouth once daily.    Provider, Historical   nystatin (MYCOSTATIN) powder Apply topically  2 (two) times daily. 9/4/24   Gordy Cordero MD   omeprazole (PRILOSEC) 20 MG capsule TAKE 1 CAPSULE BY MOUTH ONCE DAILY 7/25/24   Gordy Cordero MD   ondansetron (ZOFRAN-ODT) 4 MG TbDL Take 2 tablets (8 mg total) by mouth every 8 (eight) hours as needed (nasuea, vomiting). 8/23/24   Gordy Cordero MD   oxyCODONE (ROXICODONE) 5 MG immediate release tablet Take 1 tablet (5 mg total) by mouth every 12 (twelve) hours as needed for Pain. 10/1/24   Gordy Cordero MD   spironolactone (ALDACTONE) 50 MG tablet Take 1 tablet (50 mg total) by mouth once daily. 9/19/24 12/18/24  Eugene Woodward MD   medroxyPROGESTERone (PROVERA) 10 MG tablet Take 2 tablets (20 mg total) by mouth 3 (three) times daily. 7/13/22 8/9/22  Yolanda Barriga MD     Anticoagulants/Antiplatelets: no anticoagulation    Allergies:   Review of patient's allergies indicates:   Allergen Reactions    Flecainide Shortness Of Breath and Swelling    Shellfish containing products Other (See Comments)     Makes gout terrible    Vancomycin analogues Hives, Itching and Rash     Sedation History:  no adverse reactions    Review of Systems:   Hematological: no known coagulopathies  Respiratory: no shortness of breath  Cardiovascular: no chest pain  Gastrointestinal: no abdominal pain  Genito-Urinary: no dysuria  Musculoskeletal: negative  Neurological: no TIA or stroke symptoms         OBJECTIVE:     Vital Signs (Most Recent)  Pulse: (!) 58 (10/16/24 0957)  Resp: 20 (10/16/24 0957)  BP: (!) 143/80 (10/16/24 0957)  SpO2: 95 % (10/16/24 0957)    Physical Exam:  ASA: 2  Mallampati: n/a    General: no acute distress  Mental Status: alert and oriented to person, place and time  HEENT: normocephalic, atraumatic  Chest: unlabored breathing  Heart: regular heart rate  Abdomen: distended  Extremity: moves all extremities    ASSESSMENT/PLAN:     Sedation Plan: local  Patient will undergo ultrasound guided paracentesis.    Bushra Epps PA-C  Interventional Radiology    Spectra: [id number]

## 2024-10-16 NOTE — PROCEDURES
Radiology Post-Procedure Note    Pre Op Diagnosis: Ascites  Post Op Diagnosis: Same    Procedure: Ultrasound Guided Paracentesis    Procedure performed by: Bushra Epps PA-C    Written Informed Consent Obtained: Yes  Specimen Removed: YES, clear yellow fluid  Estimated Blood Loss: Minimal    Findings:   Successful paracentesis. Access obtained in the LLQ. Albumin administered PRN per protocol.    Patient tolerated procedure well.    Bushra Epps PA-C  Interventional Radiology   Spectra: [id number]

## 2024-10-16 NOTE — PLAN OF CARE
Paracentesis done. Removed 5700 ml. Albumin 37.5 grams administered. Patient AAOx3, no distress noted, respirations even and unlabored,  VSS. LLQ site clean, dry, and intact; no bleeding or hematoma noted. Peripheral IV removed. Personal belongings returned to patient. Discharge instructions given to patient; patient accepting of education provided. Patient to be wheeled to [place] to meet family for ride home. Patient stable for transport.

## 2024-10-17 ENCOUNTER — CLINICAL SUPPORT (OUTPATIENT)
Dept: REHABILITATION | Facility: HOSPITAL | Age: 59
End: 2024-10-17
Payer: MEDICAID

## 2024-10-17 DIAGNOSIS — R29.898 DECREASED STRENGTH OF LOWER EXTREMITY: Primary | ICD-10-CM

## 2024-10-17 DIAGNOSIS — R68.89 DECREASED FUNCTIONAL ACTIVITY TOLERANCE: ICD-10-CM

## 2024-10-17 DIAGNOSIS — R26.89 DECREASED FUNCTIONAL MOBILITY: ICD-10-CM

## 2024-10-17 DIAGNOSIS — R29.898 DECREASED STRENGTH OF UPPER EXTREMITY: ICD-10-CM

## 2024-10-17 PROCEDURE — 97110 THERAPEUTIC EXERCISES: CPT | Mod: CQ

## 2024-10-17 NOTE — PROGRESS NOTES
[facility]   Physical Therapy Treatment Note     Name: Vicky Alvarado  Clinic Number: [id number]    Therapy Diagnosis:   Encounter Diagnoses   Name Primary?    Decreased strength of lower extremity Yes    Decreased strength of upper extremity     Decreased functional mobility     Decreased functional activity tolerance      Physician: Sarah Harding PA-C    Visit Date: 10/17/2024    Physician Orders: PT Eval and Treat   Medical Diagnosis from Referral: R53.81 (ICD-10-CM) - Debility   Evaluation Date: 10/7/2024  Authorization Period Expiration: 10/7/2025  Plan of Care Expiration: 12/20/2024  Progress Note Due: 10th visit  Visit # / Visits authorized: 2 / 20 + eval  FOTO: 1/3    PTA Visit #: 2 / 5     Time In: 1330  Time Out: 1440  Total Treatment Time: 70 minutes  Total Billable Time: 70 minutes (5 TE)    Precautions: Standard, Diabetes, Fall, and live cirrhosis A fib      SUBJECTIVE     Patient reports: that she felt sore after last visit, but it went away by the next day.   She was compliant with home exercise program.  Response to previous treatment: appropriate muscle response  Functional change: ongoing; ambulating with rollator    Pain: 2/10, currently  Location: bilateral knees    Patient goals: improve fitness    OBJECTIVE     Objective Measures updated at progress report unless specified.     Treatment     *Per Medicaid guidelines all therapy billed as therex*  *PT one-on-one with patient for entire session with PT extender utilized for remainder of therapy session*     Vicky received the treatments listed below:      therapeutic exercises to develop strength, endurance, ROM, flexibility, posture, and core stabilization for 70 minutes including:  Ambulation to and from waiting room with rollator and therapist Supervision  Sit to stands on hi-lo; 2 x 8 reps - cues for nose over toes  Lateral walks at hi-lo; 5 laps - NP, resume next visit  Long arc quads at EOM; 10 second  hold, 3 x 10 reps Bilateral  Seated heel raises; 3 x 10 reps  NBOS in // bars; 1 minute, 3 trials  Nustep for 8 minutes, Level 1 for endurance  Step ups in // bars onto 2-inch step; 2 x 8 reps Bilateral    Patient Education and Home Exercises     Home Exercises Provided and Patient Education Provided     Education provided:   - compliance with HEP  - plan of care  - prognosis  - importance of exercise and functional mobility    Written Home Exercises Provided: Patient instructed to cont prior HEP. Exercises were reviewed and Vicky was able to demonstrate them prior to the end of the session.  Vicky demonstrated good  understanding of the education provided. See EMR under Patient Instructions for exercises provided during therapy sessions    ASSESSMENT     Overall good tolerance to therapeutic interventions noting adequate muscle response throughout. Addition of step ups onto 2-inch step to improve functional tolerance to ADL's. Visible posterior sway with balance, however able to complete without use of UE assist. Will continue per POC towards treatment goals.  Vicky Is progressing well towards her goals.   Pt prognosis is Fair.     Pt will continue to benefit from skilled outpatient physical therapy to address the deficits listed in the problem list box on initial evaluation, provide pt/family education and to maximize pt's level of independence in the home and community environment.     Pt's spiritual, cultural and educational needs considered and pt agreeable to plan of care and goals.     Anticipated barriers to physical therapy: chronicity, age, co-morbidities    Goals:   Short Term Goals (6 Weeks): - Appropriate, ongoing  1. Patient will be independent with home exercise program to supplement physical therapy treatment in improving functional status.  2. Pt will improve impaired lower extremity manual muscle tests to >/= 4-/5 to improve dynamic lower extremity support for closed chain tasks.  3. Patient  will perform 3 consecutive hip hinges with less than 3 VC to demonstrate improved lumbopelvic movement for safety during household ADL's and improved lifting techniques.   4. Patient will report decreased pain with activity 3/10 to demonstrate improved tolerance for activity and household ADL's.     Long Term Goals (10 Weeks): - Appropriate, ongoing  1. Pt will improve impaired lower extremity manual muscle tests to >/= 4/5 to improve dynamic lower extremity support for closed chain tasks.  2. Patient will improve the total FOTO MSK Survey Score to </= 30% limited to demonstrate increased perceived functional mobility.  3. Patient will perform at least 10 sit to stands in 30 seconds without UE support to demonstrate increased functional strength.   4. Patient will perform 3 consecutive hip hinges with less than 0VC to demonstrate improved lumbopelvic movement for safety during household ADL's and improved lifting techniques.   5. Patient will initiate walking program to demonstrate improved activity tolerance.  6. Patient will perform timed up and go with least restrictive assistive device in < 10 seconds to improve gait speed and safety with community ambulation.  7. Patient will perform at least 8-10 sit to stands in 30 seconds without UE support to demonstrate increased functional strength.     PLAN     Plan of Care Certification: 10/7/2024 to 12/20/2024.     Outpatient Physical Therapy 2 times weekly for 10 weeks to include the following interventions: Aquatic Therapy, Cervical/Lumbar Traction, Electrical Stimulation TENS, Gait Training, Manual Therapy, Moist Heat/ Ice, Neuromuscular Re-ed, Orthotic Management and Training, Patient Education, Self Care, Therapeutic Activities, Therapeutic Exercise, and functional dry needling    PT/PTA met face to face to discuss patient's treatment plan and progress towards established goals. Patient will be seen by physical therapist every sixth visit and minimally once per  month.    Marah Vidales, PTA

## 2024-10-23 ENCOUNTER — HOSPITAL ENCOUNTER (OUTPATIENT)
Dept: INTERVENTIONAL RADIOLOGY/VASCULAR | Facility: HOSPITAL | Age: 59
Discharge: HOME | End: 2024-10-23
Attending: INTERNAL MEDICINE
Payer: MEDICAID

## 2024-10-23 VITALS
OXYGEN SATURATION: 100 % | RESPIRATION RATE: 18 BRPM | HEART RATE: 56 BPM | SYSTOLIC BLOOD PRESSURE: 140 MMHG | DIASTOLIC BLOOD PRESSURE: 70 MMHG

## 2024-10-23 DIAGNOSIS — K74.69 OTHER CIRRHOSIS OF LIVER: Chronic | ICD-10-CM

## 2024-10-23 PROCEDURE — P9047 ALBUMIN (HUMAN), 25%, 50ML: HCPCS | Mod: JZ,JG

## 2024-10-23 PROCEDURE — P9047 ALBUMIN (HUMAN), 25%, 50ML: HCPCS | Mod: JZ,JG | Performed by: PHYSICIAN ASSISTANT

## 2024-10-23 PROCEDURE — 63600175 PHARM REV CODE 636 W HCPCS: Mod: JZ,JG

## 2024-10-23 PROCEDURE — 49083 ABD PARACENTESIS W/IMAGING: CPT | Mod: ,,, | Performed by: PHYSICIAN ASSISTANT

## 2024-10-23 PROCEDURE — 49083 ABD PARACENTESIS W/IMAGING: CPT | Performed by: STUDENT IN AN ORGANIZED HEALTH CARE EDUCATION/TRAINING PROGRAM

## 2024-10-23 PROCEDURE — 63600175 PHARM REV CODE 636 W HCPCS: Mod: JZ,JG | Performed by: PHYSICIAN ASSISTANT

## 2024-10-23 RX ORDER — ALBUMIN HUMAN 250 G/1000ML
SOLUTION INTRAVENOUS
Status: DISCONTINUED | OUTPATIENT
Start: 2024-10-23 | End: 2024-10-24 | Stop reason: HOSPADM

## 2024-10-23 RX ORDER — ALBUMIN HUMAN 250 G/1000ML
SOLUTION INTRAVENOUS
Status: DISPENSED
Start: 2024-10-23 | End: 2024-10-23

## 2024-10-23 RX ORDER — ALBUMIN HUMAN 250 G/1000ML
SOLUTION INTRAVENOUS
Status: COMPLETED
Start: 2024-10-23 | End: 2024-10-23

## 2024-10-23 RX ADMIN — ALBUMIN (HUMAN) 25 G: 12.5 SOLUTION INTRAVENOUS at 11:10

## 2024-10-23 NOTE — PLAN OF CARE
Pt arrived to MPU bay 2 for paracentesis. Pt oriented to unit and staff. Plan of care reviewed with patient, patient verbalizes understanding. Comfort measures utilized. Pt safely transferred from wheelchair to procedural table. Fall risk reviewed with patient, fall risk interventions maintained. Blankets applied. Pt prepped and draped utilizing standard sterile technique. Patient placed on continuous monitoring, as required by sedation policy. Timeouts completed utilizing standard universal time-out, per department and facility policy. RN to remain at bedside, continuous monitoring maintained. Pt resting comfortably. Denies pain/discomfort. Will continue to monitor. See flow sheets for monitoring, medication administration, and updates.

## 2024-10-23 NOTE — DISCHARGE INSTRUCTIONS
"Please call with any questions or concerns.    Monday through Friday 8:00 am - 4:30 pm  Interventional Radiology Clinic  [phone]    After hours/weekends  Ask the  for the "Interventional Radiology Physician on call"  [phone]      "

## 2024-10-23 NOTE — PLAN OF CARE
Paracentesis complete. Patient tolerated well. 5400cc removed from left abdomen. 150cc of albumin given IV. Bandage applied to left lower abdomen clean, dry and intact. IV discontinued with catheter intact. Patient refused discharge instructions and was transferred back to family via wheelchair.

## 2024-10-23 NOTE — H&P
Radiology History & Physical      SUBJECTIVE:     Chief Complaint: abdominal distention    History of Present Illness:  Vicky Alvarado is a 59 y.o. female who presents for ultrasound guided paracentesis  Past Medical History:   Diagnosis Date    Anticoagulant long-term use     Arthritis     Atrial fibrillation     cardioversion    Atrial fibrillation with RVR     HAS WATCHMAN IN PLACE NOW    Avascular necrosis     L hand    Cellulitis of extremity 05/07/2022    CHF (congestive heart failure)     Chronic fatigue     Cirrhosis of liver with ascites     Depression     Diabetes mellitus     Encounter for blood transfusion 07/22/2020    Encounter for blood transfusion 03/2022    Fatty liver     GERD (gastroesophageal reflux disease)     Hx of psychiatric care     Hypertension     Iron deficiency anemia 05/07/2022    TIBC 444 with saturated iron 8    Left leg cellulitis 05/07/2022    Liver disease     Obese     Pre-diabetes 05/07/2022    Psychiatric problem     Sleep apnea     wears cpap    SOB (shortness of breath) on exertion     Weight loss     75lb intentional weight loss     Past Surgical History:   Procedure Laterality Date    ABLATION OF ARRHYTHMOGENIC FOCUS FOR ATRIAL FIBRILLATION N/A 07/20/2020    Procedure: Ablation atrial fibrillation;  Surgeon: Gabriel Hawley MD;  Location: [facility] EP LAB;  Service: Cardiology;  Laterality: N/A;  afib, PVI, RFA, REENA, SHADY, anes, MB, 3 Prep    ABLATION OF ARRHYTHMOGENIC FOCUS FOR ATRIAL FIBRILLATION N/A 01/24/2022    Procedure: Ablation atrial fibrillation;  Surgeon: Gabriel Hawley MD;  Location: [facility] EP LAB;  Service: Cardiology;  Laterality: N/A;  afib/afl, PVI (re-do)/CTI, RFA, REENA (cx if SR), SHADY, anes, MB, 3 Prep    APPLICATION OF WOUND VACUUM-ASSISTED CLOSURE DEVICE Left 08/03/2020    Procedure: APPLICATION, WOUND VAC;  Surgeon: SEMAJ Márquez III, MD;  Location: [facility] OR [address] [place] [address];  Service: Peripheral Vascular;  Laterality: Left;    APPLICATION OF WOUND  VACUUM-ASSISTED CLOSURE DEVICE Left 08/06/2020    Procedure: APPLICATION, WOUND VAC;  Surgeon: SEMAJ Márquez III, MD;  Location: [facility] OR 2ND FLR;  Service: Peripheral Vascular;  Laterality: Left;    CARPAL TUNNEL RELEASE Right 06/10/2020    Procedure: RELEASE, CARPAL TUNNEL - RIGHT;  Surgeon: Adelaida Hall MD;  Location: [facility] OR;  Service: Orthopedics;  Laterality: Right;  GENERAL AND REGIONAL    CARPAL TUNNEL RELEASE Left 05/05/2021    Procedure: RELEASE, CARPAL TUNNEL, LEFT;  Surgeon: Adelaida Hall MD;  Location: [facility] OR;  Service: Orthopedics;  Laterality: Left;  GENERAL & REGIONAL IN PACU    CARPECTOMY Left 09/06/2023    Procedure: CARPECTOMY;  Surgeon: Adelaida Hall MD;  Location: [facility] OR;  Service: Orthopedics;  Laterality: Left;  MAC/REGIONAL  LEFT PROXIMAL ROW    CLOSURE OF WOUND Left 08/06/2020    Procedure: CLOSURE, WOUND;  Surgeon: SEMAJ Márquez III, MD;  Location: [facility] OR 2ND FLR;  Service: Peripheral Vascular;  Laterality: Left;  Complex    COLONOSCOPY N/A 08/17/2021    Procedure: COLONOSCOPY Suprep;  Surgeon: Loco Deluca MD;  Location: [facility];  Service: Endoscopy;  Laterality: N/A;    ECHOCARDIOGRAM,TRANSESOPHAGEAL N/A 07/24/2023    Procedure: Transesophageal echo (REENA) intra-procedure log documentation;  Surgeon: Leyla Diagnostic Provider;  Location: [facility] EP LAB;  Service: Cardiology;  Laterality: N/A;  s/p WM, REENA, anes, 3 Prep    ESOPHAGOGASTRODUODENOSCOPY N/A 08/17/2021    Procedure: EGD (ESOPHAGOGASTRODUODENOSCOPY);  Surgeon: Loco Deluca MD;  Location: [facility] ENDO;  Service: Endoscopy;  Laterality: N/A;  Patient is schedule to have her Covid test on 08/14/2021 at the [facility] @ 9:30am. [place].    EXPLORATION OF FEMORAL ARTERY Left 07/21/2020    Procedure: EXPLORATION, ARTERY, FEMORAL;  Surgeon: SEMAJ Márquez III, MD;  Location: [facility] OR 2ND FLR;  Service: Peripheral Vascular;  Laterality: Left;  1. Urgent Direct repair, L SFA branch  laceration    FOOT SURGERY      HYSTEROSCOPY WITH DILATION AND CURETTAGE OF UTERUS N/A 02/19/2022    Procedure: HYSTEROSCOPY, WITH DILATION AND CURETTAGE OF UTERUS;  Surgeon: Shane Palomo MD;  Location: [address];  Service: OB/GYN;  Laterality: N/A;    INCISION AND DRAINAGE OF KNEE Left 05/12/2022    Procedure: INCISION AND DRAINAGE, Prepatellar bursectomy.;  Surgeon: Dre Guzmán MD;  [address]  Service: Orthopedics;  Laterality: Left;    LEFT HEART CATHETERIZATION Left 02/07/2023    Procedure: Left heart cath;  Surgeon: Josiah Terry MD;  Location: [facility] CATH LAB;  Service: Cardiology;  Laterality: Left;  borderline moderate bleeding risk 6.3%    OCCLUSION OF LEFT ATRIAL APPENDAGE N/A 06/12/2023    Procedure: Left atrial appendage occlusion;  Surgeon: Gabriel Hawley MD;  Location: [facility] EP LAB;  Service: Cardiology;  Laterality: N/A;  afib, watchman, BSCI, demi, ALIYA ibarra, 3prep    RELEASE OF ULNAR NERVE AT CUBITAL TUNNEL Bilateral     RIGHT HEART CATHETERIZATION Right 08/05/2024    Procedure: INSERTION, CATHETER, RIGHT HEART;  Surgeon: Zane Liu MD;  Location: [facility] CATH LAB;  Service: Cardiology;  Laterality: Right;    ROBOT-ASSISTED LAPAROSCOPIC ABDOMINAL HYSTERECTOMY USING DA KEIRY XI N/A 08/09/2022    Procedure: XI ROBOTIC HYSTERECTOMY;  Surgeon: Yolanda Barriga MD;  Location: [facility];  Service: OB/GYN;  Laterality: N/A;  dual console requested    ROBOT-ASSISTED LAPAROSCOPIC SALPINGO-OOPHORECTOMY Bilateral 08/09/2022    Procedure: ROBOTIC SALPINGO-OOPHORECTOMY;  Surgeon: Yolanda Barriga MD;  Location: [facility];  Service: OB/GYN;  Laterality: Bilateral;    TRANSESOPHAGEAL ECHOCARDIOGRAPHY N/A 06/12/2023    Procedure: ECHOCARDIOGRAM, TRANSESOPHAGEAL;  Surgeon: Cyril Mast MD;  Location: [facility] EP LAB;  Service: Cardiology;  Laterality: N/A;    TREATMENT OF CARDIAC ARRHYTHMIA N/A 01/29/2020    Procedure: CARDIOVERSION;  Surgeon: Gabriel Hawley MD;  Location:  [facility] EP LAB;  Service: Cardiology;  Laterality: N/A;  af, demi, dccv, anes, mb, 345    TREATMENT OF CARDIAC ARRHYTHMIA  01/24/2022    Procedure: Cardioversion or Defibrillation;  Surgeon: Gabriel Hawley MD;  Location: [facility] EP LAB;  Service: Cardiology;;    WOUND DEBRIDEMENT Left 08/06/2020    Procedure: DEBRIDEMENT, WOUND;  Surgeon: SEMAJ Márquez III, MD;  Location: [facility] OR [address] [place] [address];  Service: Peripheral Vascular;  Laterality: Left;       Home Meds:   Prior to Admission medications    Medication Sig Start Date End Date Taking? Authorizing Provider   albuterol (VENTOLIN HFA) 90 mcg/actuation inhaler Inhale 2 puffs into the lungs every 6 (six) hours as needed for Wheezing. Rescue 12/14/23 12/13/24  Gordy Cordero MD   ALPRAZolam (XANAX) 0.5 MG tablet Take 1 tablet (0.5 mg total) by mouth 2 (two) times daily as needed for Anxiety. 9/4/24   Gordy Cordero MD   b complex vitamins capsule Take 1 capsule by mouth every other day.    Provider, Historical   DULoxetine (CYMBALTA) 60 MG capsule Take 1 capsule (60 mg total) by mouth once daily. 8/12/24   Brennon Nunez MD   ferrous sulfate 325 (65 FE) MG EC tablet Take 1 tablet (325 mg total) by mouth every other day. 9/18/24   Eugene Woodward MD   furosemide (LASIX) 20 MG tablet Take 1 tablet (20 mg total) by mouth once daily. 9/19/24 9/19/25  Eugene Woodward MD   lactulose (CHRONULAC) 10 gram/15 mL solution Take 15 mLs (10 g total) by mouth 2 (two) times daily. Please ensure you are having at least 2-3 bowel movements every day. 8/12/24   Brennon Nunez MD   metoprolol succinate (TOPROL-XL) 25 MG 24 hr tablet Take 3 tablets (75 mg total) by mouth 2 (two) times daily. 9/18/24 12/17/24  Eugene Woodward MD   midodrine (PROAMATINE) 5 MG Tab Take 3 tablets (15 mg total) by mouth 3 (three) times daily. 9/18/24 12/17/24  Eugene Woodward MD   multivitamin (THERAGRAN) per tablet Take 1 tablet by mouth once daily.    Provider, Historical   nystatin (MYCOSTATIN) powder Apply topically  2 (two) times daily. 9/4/24   Gordy Cordero MD   omeprazole (PRILOSEC) 20 MG capsule TAKE 1 CAPSULE BY MOUTH ONCE DAILY 7/25/24   Gordy Cordero MD   ondansetron (ZOFRAN-ODT) 4 MG TbDL Take 2 tablets (8 mg total) by mouth every 8 (eight) hours as needed (nasuea, vomiting). 8/23/24   Gordy Cordero MD   oxyCODONE (ROXICODONE) 5 MG immediate release tablet Take 1 tablet (5 mg total) by mouth every 12 (twelve) hours as needed for Pain. 10/1/24   Gordy Cordero MD   spironolactone (ALDACTONE) 50 MG tablet Take 1 tablet (50 mg total) by mouth once daily. 9/19/24 12/18/24  Eugene Woodward MD   medroxyPROGESTERone (PROVERA) 10 MG tablet Take 2 tablets (20 mg total) by mouth 3 (three) times daily. 7/13/22 8/9/22  Yolanda Barriga MD     Anticoagulants/Antiplatelets: no anticoagulation    Allergies:   Review of patient's allergies indicates:   Allergen Reactions    Flecainide Shortness Of Breath and Swelling    Shellfish containing products Other (See Comments)     Makes gout terrible    Vancomycin analogues Hives, Itching and Rash     Sedation History:  no adverse reactions    Review of Systems:   Hematological: no known coagulopathies  Respiratory: no shortness of breath  Cardiovascular: no chest pain  Gastrointestinal: no abdominal pain  Genito-Urinary: no dysuria  Musculoskeletal: negative  Neurological: no TIA or stroke symptoms         OBJECTIVE:     Vital Signs (Most Recent)       Physical Exam:  ASA: 2  Mallampati: n/a    General: no acute distress  Mental Status: alert and oriented to person, place and time  HEENT: normocephalic, atraumatic  Chest: unlabored breathing  Heart: regular heart rate  Abdomen: distended  Extremity: moves all extremities    ASSESSMENT/PLAN:     Sedation Plan: local  Patient will undergo ultrasound guided paracentesis.    Bushra Epps PA-C  Interventional Radiology   Spectra: [id number]

## 2024-10-23 NOTE — PLAN OF CARE
Wheeled pt to 1st floor, pt denied discharged instruction, pt tolerated paracentesis well, removed 6400cc noted, llq dressing c/d/i

## 2024-10-23 NOTE — PROCEDURES
Radiology Post-Procedure Note    Pre Op Diagnosis: Ascites  Post Op Diagnosis: Same    Procedure: Ultrasound Guided Paracentesis    Procedure performed by: Jonathan Alba MD    Written Informed Consent Obtained: Yes  Specimen Removed: YES , clear yellow fluid  Estimated Blood Loss: Minimal    Findings:   Successful  paracentesis. Access obtained in the LLQ. Albumin administered PRN per protocol.    Patient tolerated procedure well.    Jonathan Alba MD  Radiology, PGY-2

## 2024-10-24 ENCOUNTER — CLINICAL SUPPORT (OUTPATIENT)
Dept: REHABILITATION | Facility: HOSPITAL | Age: 59
End: 2024-10-24
Payer: MEDICAID

## 2024-10-24 DIAGNOSIS — R29.898 DECREASED STRENGTH OF UPPER EXTREMITY: ICD-10-CM

## 2024-10-24 DIAGNOSIS — R29.898 DECREASED STRENGTH OF LOWER EXTREMITY: Primary | ICD-10-CM

## 2024-10-24 DIAGNOSIS — R26.89 DECREASED FUNCTIONAL MOBILITY: ICD-10-CM

## 2024-10-24 DIAGNOSIS — R68.89 DECREASED FUNCTIONAL ACTIVITY TOLERANCE: ICD-10-CM

## 2024-10-24 PROCEDURE — 97110 THERAPEUTIC EXERCISES: CPT | Mod: CQ

## 2024-10-24 NOTE — PROGRESS NOTES
[facility]   Physical Therapy Treatment Note     Name: Vicky Alvarado  Clinic Number: [id number]    Therapy Diagnosis:   Encounter Diagnoses   Name Primary?    Decreased strength of lower extremity Yes    Decreased strength of upper extremity     Decreased functional mobility     Decreased functional activity tolerance      Physician: Sarah Harding PA-C    Visit Date: 10/24/2024    Physician Orders: PT Eval and Treat   Medical Diagnosis from Referral: R53.81 (ICD-10-CM) - Debility   Evaluation Date: 10/7/2024  Authorization Period Expiration: 10/7/2025  Plan of Care Expiration: 12/20/2024  Progress Note Due: 10th visit  Visit # / Visits authorized: 3 / 20 + eval  FOTO: 1/3    PTA Visit #: 3 / 5     Time In: 1330  Time Out: 1425  Total Treatment Time: 55 minutes  Total Billable Time: 55 minutes (4 TE - Medicaid)    Precautions: Standard, Diabetes, Fall, and live cirrhosis A fib      SUBJECTIVE     Patient reports: she recently had fluid drained which has really helped and she feels less short of breath. She was sore after last visit and feels tired today.  She was compliant with home exercise program.  Response to previous treatment: appropriate muscle response  Functional change: ongoing; ambulating with rollator    Pain: 2/10, currently  Location: bilateral knees    Patient goals: improve fitness    OBJECTIVE     Objective Measures updated at progress report unless specified.     Treatment     *Per Medicaid guidelines all therapy billed as therex*  *PT one-on-one with patient for entire session with PT extender utilized for remainder of therapy session*     Vicky received the treatments listed below:      therapeutic exercises to develop strength, endurance, ROM, flexibility, posture, and core stabilization for 55 minutes including:  Ambulation to and from waiting room with rollator and therapist Supervision  Sit to stands on hi-lo; 2 x 8 reps - cues for nose over toes  Lateral  walks at hi-lo; 5 laps + 1 lb AW  Long arc quads at EOM + 1.5 lb AW; 10 second hold, 3 x 10 reps Bilateral  Seated heel raises; 3 x 10 reps + 1 lb AW  NBOS in // bars; 1 minute, 3 trials - NP  Nustep for 12 minutes, Level 1 for endurance (985 steps)  Step ups in // bars onto 2-inch step; 2 x 8 reps Bilateral - held    Patient Education and Home Exercises     Home Exercises Provided and Patient Education Provided     Education provided:   - compliance with HEP  - plan of care  - prognosis  - importance of exercise and functional mobility    Written Home Exercises Provided: Patient instructed to cont prior HEP. Exercises were reviewed and Vicky was able to demonstrate them prior to the end of the session.  Vicky demonstrated good  understanding of the education provided. See EMR under Patient Instructions for exercises provided during therapy sessions    ASSESSMENT     Overall good tolerance to therapeutic interventions noting adequate muscle response throughout. Addition of 1.5 lb AW's for long arc quads and lateral walks. She notes significant fatigue post session. Will continue per POC towards treatment goals.  Vicky Is progressing well towards her goals.   Pt prognosis is Fair.     Pt will continue to benefit from skilled outpatient physical therapy to address the deficits listed in the problem list box on initial evaluation, provide pt/family education and to maximize pt's level of independence in the home and community environment.     Pt's spiritual, cultural and educational needs considered and pt agreeable to plan of care and goals.     Anticipated barriers to physical therapy: chronicity, age, co-morbidities    Goals:   Short Term Goals (6 Weeks): - Appropriate, ongoing  1. Patient will be independent with home exercise program to supplement physical therapy treatment in improving functional status.  2. Pt will improve impaired lower extremity manual muscle tests to >/= 4-/5 to improve dynamic lower  extremity support for closed chain tasks.  3. Patient will perform 3 consecutive hip hinges with less than 3 VC to demonstrate improved lumbopelvic movement for safety during household ADL's and improved lifting techniques.   4. Patient will report decreased pain with activity 3/10 to demonstrate improved tolerance for activity and household ADL's.     Long Term Goals (10 Weeks): - Appropriate, ongoing  1. Pt will improve impaired lower extremity manual muscle tests to >/= 4/5 to improve dynamic lower extremity support for closed chain tasks.  2. Patient will improve the total FOTO MSK Survey Score to </= 30% limited to demonstrate increased perceived functional mobility.  3. Patient will perform at least 10 sit to stands in 30 seconds without UE support to demonstrate increased functional strength.   4. Patient will perform 3 consecutive hip hinges with less than 0VC to demonstrate improved lumbopelvic movement for safety during household ADL's and improved lifting techniques.   5. Patient will initiate walking program to demonstrate improved activity tolerance.  6. Patient will perform timed up and go with least restrictive assistive device in < 10 seconds to improve gait speed and safety with community ambulation.  7. Patient will perform at least 8-10 sit to stands in 30 seconds without UE support to demonstrate increased functional strength.     PLAN     Plan of Care Certification: 10/7/2024 to 12/20/2024.     Outpatient Physical Therapy 2 times weekly for 10 weeks to include the following interventions: Aquatic Therapy, Cervical/Lumbar Traction, Electrical Stimulation TENS, Gait Training, Manual Therapy, Moist Heat/ Ice, Neuromuscular Re-ed, Orthotic Management and Training, Patient Education, Self Care, Therapeutic Activities, Therapeutic Exercise, and functional dry needling    PT/PTA met face to face to discuss patient's treatment plan and progress towards established goals. Patient will be seen by physical  therapist every sixth visit and minimally once per month.    Marah Vidales, PTA

## 2024-10-30 ENCOUNTER — HOSPITAL ENCOUNTER (OUTPATIENT)
Dept: INTERVENTIONAL RADIOLOGY/VASCULAR | Facility: HOSPITAL | Age: 59
Discharge: HOME | End: 2024-10-30
Attending: INTERNAL MEDICINE
Payer: MEDICAID

## 2024-10-30 VITALS
HEART RATE: 68 BPM | RESPIRATION RATE: 17 BRPM | SYSTOLIC BLOOD PRESSURE: 153 MMHG | DIASTOLIC BLOOD PRESSURE: 81 MMHG | OXYGEN SATURATION: 100 %

## 2024-10-30 DIAGNOSIS — K74.69 OTHER CIRRHOSIS OF LIVER: Chronic | ICD-10-CM

## 2024-10-30 PROCEDURE — 63600175 PHARM REV CODE 636 W HCPCS: Performed by: PHYSICIAN ASSISTANT

## 2024-10-30 PROCEDURE — P9047 ALBUMIN (HUMAN), 25%, 50ML: HCPCS | Mod: JZ,JG | Performed by: INTERNAL MEDICINE

## 2024-10-30 PROCEDURE — 63600175 PHARM REV CODE 636 W HCPCS: Mod: JZ,JG | Performed by: INTERNAL MEDICINE

## 2024-10-30 RX ORDER — LIDOCAINE HYDROCHLORIDE 10 MG/ML
INJECTION, SOLUTION INFILTRATION; PERINEURAL
Status: COMPLETED | OUTPATIENT
Start: 2024-10-30 | End: 2024-10-30

## 2024-10-30 RX ORDER — ALBUMIN HUMAN 250 G/1000ML
SOLUTION INTRAVENOUS
Status: DISPENSED
Start: 2024-10-30 | End: 2024-10-30

## 2024-10-30 RX ORDER — ALBUMIN HUMAN 250 G/1000ML
SOLUTION INTRAVENOUS
Status: COMPLETED | OUTPATIENT
Start: 2024-10-30 | End: 2024-10-30

## 2024-10-30 RX ADMIN — LIDOCAINE HYDROCHLORIDE 10 ML: 10 INJECTION, SOLUTION INFILTRATION; PERINEURAL at 10:10

## 2024-10-30 RX ADMIN — ALBUMIN (HUMAN) 50 G: 12.5 SOLUTION INTRAVENOUS at 11:10

## 2024-11-01 ENCOUNTER — PATIENT MESSAGE (OUTPATIENT)
Dept: RESEARCH | Facility: HOSPITAL | Age: 59
End: 2024-11-01
Payer: MEDICAID

## 2024-11-04 ENCOUNTER — CLINICAL SUPPORT (OUTPATIENT)
Dept: REHABILITATION | Facility: HOSPITAL | Age: 59
End: 2024-11-04
Payer: MEDICAID

## 2024-11-04 DIAGNOSIS — R29.898 DECREASED STRENGTH OF LOWER EXTREMITY: Primary | ICD-10-CM

## 2024-11-04 DIAGNOSIS — R26.89 DECREASED FUNCTIONAL MOBILITY: ICD-10-CM

## 2024-11-04 DIAGNOSIS — R68.89 DECREASED FUNCTIONAL ACTIVITY TOLERANCE: ICD-10-CM

## 2024-11-04 DIAGNOSIS — R29.898 DECREASED STRENGTH OF UPPER EXTREMITY: ICD-10-CM

## 2024-11-04 PROCEDURE — 97110 THERAPEUTIC EXERCISES: CPT | Mod: CQ

## 2024-11-04 NOTE — PROGRESS NOTES
[facility]   Physical Therapy Treatment Note     Name: Vicky Alvarado  Clinic Number: [id number]    Therapy Diagnosis:   Encounter Diagnoses   Name Primary?    Decreased strength of lower extremity Yes    Decreased strength of upper extremity     Decreased functional mobility     Decreased functional activity tolerance      Physician: Sarah Harding PA-C    Visit Date: 11/4/2024    Physician Orders: PT Eval and Treat   Medical Diagnosis from Referral: R53.81 (ICD-10-CM) - Debility   Evaluation Date: 10/7/2024  Authorization Period Expiration: 10/7/2025  Plan of Care Expiration: 12/20/2024  Progress Note Due: 10th visit  Visit # / Visits authorized: 4 / 20 + eval  FOTO: 1/3    PTA Visit #: 4 / 5     Time In: 1252  Time Out: 1350  Total Treatment Time: 58 minutes  Total Billable Time: 58 minutes (4 TE - Medicaid)    Precautions: Standard, Diabetes, Fall, and live cirrhosis A fib      SUBJECTIVE     Patient reports: she had a gout flare up over the weekend. She noticed her ankles and knees are swollen and she has a red patch on her lower Right leg.   She was compliant with home exercise program.  Response to previous treatment: appropriate muscle response  Functional change: ongoing; ambulating with rollator    Pain: 2/10, currently  Location: bilateral knees    Patient goals: improve fitness    OBJECTIVE     Objective Measures updated at progress report unless specified.     Treatment     *Per Medicaid guidelines all therapy billed as therex*  *PT one-on-one with patient for entire session with PT extender utilized for remainder of therapy session*     Vicky received the treatments listed below:      therapeutic exercises to develop strength, endurance, ROM, flexibility, posture, and core stabilization for 58 minutes including:  Ambulation to and from waiting room with rollator and therapist Supervision  Nustep for 15 minutes, Level 1 for endurance (1,300 steps)  Sit to stands from  std chair + airex; 2 x 8 reps - cues for nose over toes  Lateral walks at half wall (16 feet); 3 laps + 2 lb AW  Long arc quads at EOM + 2 lb AW; 10 second hold, 3 x 10 reps Bilateral  Seated heel raises; 3 x 10 reps + 2 lb AW  NBOS in // bars; 1 minute, 3 trials - held  Step ups in // bars onto 2-inch step; 2 x 8 reps Bilateral - held    Patient Education and Home Exercises     Home Exercises Provided and Patient Education Provided     Education provided:   - compliance with HEP  - plan of care  - prognosis  - importance of exercise and functional mobility    Written Home Exercises Provided: Patient instructed to cont prior HEP. Exercises were reviewed and Vicky was able to demonstrate them prior to the end of the session.  Vicky demonstrated good  understanding of the education provided. See EMR under Patient Instructions for exercises provided during therapy sessions    ASSESSMENT     Modified treatment session based on subjective complaints. Overall good tolerance to therapeutic interventions performed. Notes increased fatigue post session. Will continue per POC towards treatment goals.  Vicky Is progressing well towards her goals.   Pt prognosis is Fair.     Pt will continue to benefit from skilled outpatient physical therapy to address the deficits listed in the problem list box on initial evaluation, provide pt/family education and to maximize pt's level of independence in the home and community environment.     Pt's spiritual, cultural and educational needs considered and pt agreeable to plan of care and goals.     Anticipated barriers to physical therapy: chronicity, age, co-morbidities    Goals:   Short Term Goals (6 Weeks): - Appropriate, ongoing  1. Patient will be independent with home exercise program to supplement physical therapy treatment in improving functional status.  2. Pt will improve impaired lower extremity manual muscle tests to >/= 4-/5 to improve dynamic lower extremity support for  closed chain tasks.  3. Patient will perform 3 consecutive hip hinges with less than 3 VC to demonstrate improved lumbopelvic movement for safety during household ADL's and improved lifting techniques.   4. Patient will report decreased pain with activity 3/10 to demonstrate improved tolerance for activity and household ADL's.     Long Term Goals (10 Weeks): - Appropriate, ongoing  1. Pt will improve impaired lower extremity manual muscle tests to >/= 4/5 to improve dynamic lower extremity support for closed chain tasks.  2. Patient will improve the total FOTO MSK Survey Score to </= 30% limited to demonstrate increased perceived functional mobility.  3. Patient will perform at least 10 sit to stands in 30 seconds without UE support to demonstrate increased functional strength.   4. Patient will perform 3 consecutive hip hinges with less than 0VC to demonstrate improved lumbopelvic movement for safety during household ADL's and improved lifting techniques.   5. Patient will initiate walking program to demonstrate improved activity tolerance.  6. Patient will perform timed up and go with least restrictive assistive device in < 10 seconds to improve gait speed and safety with community ambulation.  7. Patient will perform at least 8-10 sit to stands in 30 seconds without UE support to demonstrate increased functional strength.     PLAN     Plan of Care Certification: 10/7/2024 to 12/20/2024.     Outpatient Physical Therapy 2 times weekly for 10 weeks to include the following interventions: Aquatic Therapy, Cervical/Lumbar Traction, Electrical Stimulation TENS, Gait Training, Manual Therapy, Moist Heat/ Ice, Neuromuscular Re-ed, Orthotic Management and Training, Patient Education, Self Care, Therapeutic Activities, Therapeutic Exercise, and functional dry needling    PT/PTA met face to face to discuss patient's treatment plan and progress towards established goals. Patient will be seen by physical therapist every sixth  visit and minimally once per month.    Marah Vidales, PTA

## 2024-11-06 ENCOUNTER — HOSPITAL ENCOUNTER (OUTPATIENT)
Dept: INTERVENTIONAL RADIOLOGY/VASCULAR | Facility: HOSPITAL | Age: 59
Discharge: HOME | End: 2024-11-06
Attending: INTERNAL MEDICINE
Payer: MEDICAID

## 2024-11-06 VITALS
DIASTOLIC BLOOD PRESSURE: 81 MMHG | OXYGEN SATURATION: 96 % | HEART RATE: 60 BPM | RESPIRATION RATE: 19 BRPM | SYSTOLIC BLOOD PRESSURE: 158 MMHG

## 2024-11-06 DIAGNOSIS — K74.69 OTHER CIRRHOSIS OF LIVER: Chronic | ICD-10-CM

## 2024-11-06 PROCEDURE — C1729 CATH, DRAINAGE: HCPCS

## 2024-11-06 PROCEDURE — 63600175 PHARM REV CODE 636 W HCPCS: Performed by: PHYSICIAN ASSISTANT

## 2024-11-06 RX ORDER — LIDOCAINE HYDROCHLORIDE 10 MG/ML
INJECTION, SOLUTION INFILTRATION; PERINEURAL
Status: COMPLETED | OUTPATIENT
Start: 2024-11-06 | End: 2024-11-06

## 2024-11-06 RX ADMIN — LIDOCAINE HYDROCHLORIDE 5 ML: 10 INJECTION, SOLUTION INFILTRATION; PERINEURAL at 11:11

## 2024-11-06 NOTE — H&P
Radiology History & Physical      SUBJECTIVE:     Chief Complaint: abdominal distention    History of Present Illness:  Vicky Alvarado is a 59 y.o. female who presents for ultrasound guided paracentesis  Past Medical History:   Diagnosis Date    Anticoagulant long-term use     Arthritis     Atrial fibrillation     cardioversion    Atrial fibrillation with RVR     HAS WATCHMAN IN PLACE NOW    Avascular necrosis     L hand    Cellulitis of extremity 05/07/2022    CHF (congestive heart failure)     Chronic fatigue     Cirrhosis of liver with ascites     Depression     Diabetes mellitus     Encounter for blood transfusion 07/22/2020    Encounter for blood transfusion 03/2022    Fatty liver     GERD (gastroesophageal reflux disease)     Hx of psychiatric care     Hypertension     Iron deficiency anemia 05/07/2022    TIBC 444 with saturated iron 8    Left leg cellulitis 05/07/2022    Liver disease     Obese     Pre-diabetes 05/07/2022    Psychiatric problem     Sleep apnea     wears cpap    SOB (shortness of breath) on exertion     Weight loss     75lb intentional weight loss     Past Surgical History:   Procedure Laterality Date    ABLATION OF ARRHYTHMOGENIC FOCUS FOR ATRIAL FIBRILLATION N/A 07/20/2020    Procedure: Ablation atrial fibrillation;  Surgeon: Gabriel Hawley MD;  Location: [facility] EP LAB;  Service: Cardiology;  Laterality: N/A;  afib, PVI, RFA, REENA, SHADY, anes, MB, 3 Prep    ABLATION OF ARRHYTHMOGENIC FOCUS FOR ATRIAL FIBRILLATION N/A 01/24/2022    Procedure: Ablation atrial fibrillation;  Surgeon: Gabriel Hawley MD;  Location: [facility] EP LAB;  Service: Cardiology;  Laterality: N/A;  afib/afl, PVI (re-do)/CTI, RFA, REENA (cx if SR), SHADY, anes, MB, 3 Prep    APPLICATION OF WOUND VACUUM-ASSISTED CLOSURE DEVICE Left 08/03/2020    Procedure: APPLICATION, WOUND VAC;  Surgeon: SEMAJ Márquez III, MD;  Location: [facility] OR [address] [place] [address];  Service: Peripheral Vascular;  Laterality: Left;    APPLICATION OF WOUND  VACUUM-ASSISTED CLOSURE DEVICE Left 08/06/2020    Procedure: APPLICATION, WOUND VAC;  Surgeon: SEMAJ Márquez III, MD;  Location: [facility] OR 2ND FLR;  Service: Peripheral Vascular;  Laterality: Left;    CARPAL TUNNEL RELEASE Right 06/10/2020    Procedure: RELEASE, CARPAL TUNNEL - RIGHT;  Surgeon: Adelaida Hall MD;  Location: [facility] OR;  Service: Orthopedics;  Laterality: Right;  GENERAL AND REGIONAL    CARPAL TUNNEL RELEASE Left 05/05/2021    Procedure: RELEASE, CARPAL TUNNEL, LEFT;  Surgeon: Adelaida Hall MD;  Location: [facility] OR;  Service: Orthopedics;  Laterality: Left;  GENERAL & REGIONAL IN PACU    CARPECTOMY Left 09/06/2023    Procedure: CARPECTOMY;  Surgeon: Adelaida Hall MD;  Location: [facility] OR;  Service: Orthopedics;  Laterality: Left;  MAC/REGIONAL  LEFT PROXIMAL ROW    CLOSURE OF WOUND Left 08/06/2020    Procedure: CLOSURE, WOUND;  Surgeon: SEMAJ Márquez III, MD;  Location: [facility] OR 2ND FLR;  Service: Peripheral Vascular;  Laterality: Left;  Complex    COLONOSCOPY N/A 08/17/2021    Procedure: COLONOSCOPY Suprep;  Surgeon: Loco Deluca MD;  Location: [facility];  Service: Endoscopy;  Laterality: N/A;    ECHOCARDIOGRAM,TRANSESOPHAGEAL N/A 07/24/2023    Procedure: Transesophageal echo (REENA) intra-procedure log documentation;  Surgeon: Leyla Diagnostic Provider;  Location: [facility] EP LAB;  Service: Cardiology;  Laterality: N/A;  s/p WM, REENA, anes, 3 Prep    ESOPHAGOGASTRODUODENOSCOPY N/A 08/17/2021    Procedure: EGD (ESOPHAGOGASTRODUODENOSCOPY);  Surgeon: Loco Deluca MD;  Location: [facility] ENDO;  Service: Endoscopy;  Laterality: N/A;  Patient is schedule to have her Covid test on 08/14/2021 at the [facility] @ 9:30am. [place].    EXPLORATION OF FEMORAL ARTERY Left 07/21/2020    Procedure: EXPLORATION, ARTERY, FEMORAL;  Surgeon: SEMAJ Márquez III, MD;  Location: [facility] OR 2ND FLR;  Service: Peripheral Vascular;  Laterality: Left;  1. Urgent Direct repair, L SFA branch  laceration    FOOT SURGERY      HYSTEROSCOPY WITH DILATION AND CURETTAGE OF UTERUS N/A 02/19/2022    Procedure: HYSTEROSCOPY, WITH DILATION AND CURETTAGE OF UTERUS;  Surgeon: Shane Palomo MD;  Location: [address];  Service: OB/GYN;  Laterality: N/A;    INCISION AND DRAINAGE OF KNEE Left 05/12/2022    Procedure: INCISION AND DRAINAGE, Prepatellar bursectomy.;  Surgeon: Dre Guzmán MD;  [address]  Service: Orthopedics;  Laterality: Left;    LEFT HEART CATHETERIZATION Left 02/07/2023    Procedure: Left heart cath;  Surgeon: Josiah Terry MD;  Location: [facility] CATH LAB;  Service: Cardiology;  Laterality: Left;  borderline moderate bleeding risk 6.3%    OCCLUSION OF LEFT ATRIAL APPENDAGE N/A 06/12/2023    Procedure: Left atrial appendage occlusion;  Surgeon: Gabriel Hawley MD;  Location: [facility] EP LAB;  Service: Cardiology;  Laterality: N/A;  afib, watchman, BSCI, demi, ALIYA ibarra, 3prep    RELEASE OF ULNAR NERVE AT CUBITAL TUNNEL Bilateral     RIGHT HEART CATHETERIZATION Right 08/05/2024    Procedure: INSERTION, CATHETER, RIGHT HEART;  Surgeon: Zane Liu MD;  Location: [facility] CATH LAB;  Service: Cardiology;  Laterality: Right;    ROBOT-ASSISTED LAPAROSCOPIC ABDOMINAL HYSTERECTOMY USING DA KEIRY XI N/A 08/09/2022    Procedure: XI ROBOTIC HYSTERECTOMY;  Surgeon: Yolanda Barriga MD;  Location: [facility];  Service: OB/GYN;  Laterality: N/A;  dual console requested    ROBOT-ASSISTED LAPAROSCOPIC SALPINGO-OOPHORECTOMY Bilateral 08/09/2022    Procedure: ROBOTIC SALPINGO-OOPHORECTOMY;  Surgeon: Yolanda Barriga MD;  Location: [facility];  Service: OB/GYN;  Laterality: Bilateral;    TRANSESOPHAGEAL ECHOCARDIOGRAPHY N/A 06/12/2023    Procedure: ECHOCARDIOGRAM, TRANSESOPHAGEAL;  Surgeon: Cyril Mast MD;  Location: [facility] EP LAB;  Service: Cardiology;  Laterality: N/A;    TREATMENT OF CARDIAC ARRHYTHMIA N/A 01/29/2020    Procedure: CARDIOVERSION;  Surgeon: Gabriel Hawley MD;  Location:  [facility] EP LAB;  Service: Cardiology;  Laterality: N/A;  af, demi, dccv, anes, mb, 345    TREATMENT OF CARDIAC ARRHYTHMIA  01/24/2022    Procedure: Cardioversion or Defibrillation;  Surgeon: Gabriel Hawley MD;  Location: [facility] EP LAB;  Service: Cardiology;;    WOUND DEBRIDEMENT Left 08/06/2020    Procedure: DEBRIDEMENT, WOUND;  Surgeon: SEMAJ Márquez III, MD;  Location: [facility] OR [address] [place] [address];  Service: Peripheral Vascular;  Laterality: Left;       Home Meds:   Prior to Admission medications    Medication Sig Start Date End Date Taking? Authorizing Provider   albuterol (VENTOLIN HFA) 90 mcg/actuation inhaler Inhale 2 puffs into the lungs every 6 (six) hours as needed for Wheezing. Rescue 12/14/23 12/13/24  Gordy Cordero MD   ALPRAZolam (XANAX) 0.5 MG tablet Take 1 tablet (0.5 mg total) by mouth 2 (two) times daily as needed for Anxiety. 9/4/24   Gordy Cordero MD   b complex vitamins capsule Take 1 capsule by mouth every other day.    Provider, Historical   DULoxetine (CYMBALTA) 60 MG capsule Take 1 capsule (60 mg total) by mouth once daily. 8/12/24   Brennon Nunez MD   ferrous sulfate 325 (65 FE) MG EC tablet Take 1 tablet (325 mg total) by mouth every other day. 9/18/24   Eugene Woodward MD   furosemide (LASIX) 20 MG tablet Take 1 tablet (20 mg total) by mouth once daily. 9/19/24 9/19/25  Eugene Woodward MD   lactulose (CHRONULAC) 10 gram/15 mL solution Take 15 mLs (10 g total) by mouth 2 (two) times daily. Please ensure you are having at least 2-3 bowel movements every day. 8/12/24   Brennon Nunez MD   metoprolol succinate (TOPROL-XL) 25 MG 24 hr tablet Take 3 tablets (75 mg total) by mouth 2 (two) times daily. 9/18/24 12/17/24  Eugene Woodward MD   midodrine (PROAMATINE) 5 MG Tab Take 3 tablets (15 mg total) by mouth 3 (three) times daily. 9/18/24 12/17/24  Eugene Woodward MD   multivitamin (THERAGRAN) per tablet Take 1 tablet by mouth once daily.    Provider, Historical   nystatin (MYCOSTATIN) powder Apply topically  2 (two) times daily. 9/4/24   Gordy Cordero MD   omeprazole (PRILOSEC) 20 MG capsule TAKE 1 CAPSULE BY MOUTH ONCE DAILY 7/25/24   Gordy Cordero MD   ondansetron (ZOFRAN-ODT) 4 MG TbDL Take 2 tablets (8 mg total) by mouth every 8 (eight) hours as needed (nasuea, vomiting). 10/28/24   Gordy Cordero MD   oxyCODONE (ROXICODONE) 5 MG immediate release tablet Take 1 tablet (5 mg total) by mouth every 12 (twelve) hours as needed for Pain. 10/28/24   Gordy Cordero MD   spironolactone (ALDACTONE) 50 MG tablet Take 1 tablet (50 mg total) by mouth once daily. 9/19/24 12/18/24  Eugene Woodward MD   medroxyPROGESTERone (PROVERA) 10 MG tablet Take 2 tablets (20 mg total) by mouth 3 (three) times daily. 7/13/22 8/9/22  Yolanda Barriga MD     Anticoagulants/Antiplatelets: no anticoagulation    Allergies:   Review of patient's allergies indicates:   Allergen Reactions    Flecainide Shortness Of Breath and Swelling    Shellfish containing products Other (See Comments)     Makes gout terrible    Vancomycin analogues Hives, Itching and Rash     Sedation History:  no adverse reactions    Review of Systems:   Hematological: no known coagulopathies  Respiratory: no shortness of breath  Cardiovascular: no chest pain  Gastrointestinal: no abdominal pain  Genito-Urinary: no dysuria  Musculoskeletal: negative  Neurological: no TIA or stroke symptoms         OBJECTIVE:     Vital Signs (Most Recent)  Pulse: 65 (11/06/24 1051)  Resp: 17 (11/06/24 1051)  BP: (!) 153/85 (11/06/24 1051)  SpO2: 100 % (11/06/24 1051)    Physical Exam:  ASA: 2  Mallampati: n/a    General: no acute distress  Mental Status: alert and oriented to person, place and time  HEENT: normocephalic, atraumatic  Chest: unlabored breathing  Heart: regular heart rate  Abdomen: distended  Extremity: moves all extremities    ASSESSMENT/PLAN:     Sedation Plan: local  Patient will undergo ultrasound guided paracentesis.    Bushra Epps PA-C  Interventional Radiology    Spectra: [id number]

## 2024-11-06 NOTE — PLAN OF CARE
Paracentesis done. Removed 3450 ml. No albumin per protocol. Patient AAOx3, no distress noted, respirations even and unlabored,VSS. LLQ site clean, dry, and intact; no bleeding or hematoma noted. Personal belongings returned to patient. Discharge instructions discussed with patient; patient accepting of education provided. Patient to be wheeled to [place] to meet family for ride home. Patient stable for transport.

## 2024-11-06 NOTE — PROCEDURES
Radiology Post-Procedure Note    Pre Op Diagnosis: Ascites  Post Op Diagnosis: Same    Procedure: Ultrasound Guided Paracentesis    Procedure performed by: Bushra Epps PA-C    Written Informed Consent Obtained: Yes  Specimen Removed: YES, clear yellow fluid  Estimated Blood Loss: Minimal    Findings:   Successful paracentesis. Access obtained in the LLQ. Albumin administered PRN per protocol.    Patient tolerated procedure well.    Bushra Epps PA-C  Interventional Radiology   Spectra: [id number]

## 2024-11-06 NOTE — PLAN OF CARE
Patient AAOx3, no distress noted, respirations even and unlabored, VSS. Acceptance of education, consents signed, H/P done. Labs reviewed. Patient in MPU Room 2 for paracentesis procedure. Warm blankets applied to patient. Patient prepped and draped in sterile fashion.

## 2024-11-08 ENCOUNTER — CLINICAL SUPPORT (OUTPATIENT)
Dept: REHABILITATION | Facility: HOSPITAL | Age: 59
End: 2024-11-08
Payer: MEDICAID

## 2024-11-08 DIAGNOSIS — R68.89 DECREASED FUNCTIONAL ACTIVITY TOLERANCE: ICD-10-CM

## 2024-11-08 DIAGNOSIS — R29.898 DECREASED STRENGTH OF UPPER EXTREMITY: ICD-10-CM

## 2024-11-08 DIAGNOSIS — R29.898 DECREASED STRENGTH OF LOWER EXTREMITY: Primary | ICD-10-CM

## 2024-11-08 DIAGNOSIS — R26.89 DECREASED FUNCTIONAL MOBILITY: ICD-10-CM

## 2024-11-08 PROCEDURE — 97110 THERAPEUTIC EXERCISES: CPT | Mod: CQ

## 2024-11-08 NOTE — PROGRESS NOTES
[facility]   Physical Therapy Treatment Note     Name: Vicky Alvarado  Clinic Number: [id number]    Therapy Diagnosis:   Encounter Diagnoses   Name Primary?    Decreased strength of lower extremity Yes    Decreased strength of upper extremity     Decreased functional mobility     Decreased functional activity tolerance      Physician: Sarah Harding PA-C    Visit Date: 11/8/2024    Physician Orders: PT Eval and Treat   Medical Diagnosis from Referral: R53.81 (ICD-10-CM) - Debility   Evaluation Date: 10/7/2024  Authorization Period Expiration: 10/7/2025  Plan of Care Expiration: 12/20/2024  Progress Note Due: 10th visit  Visit # / Visits authorized: 5 / 20 + eval  FOTO: 1/3    PTA Visit #: 5 / 5     Time In: 1000  Time Out: 1047  Total Treatment Time: 47 minutes  Total Billable Time: 47 minutes (3 TE - Medicaid)    Precautions: Standard, Diabetes, Fall, and live cirrhosis A fib      SUBJECTIVE     Patient reports: she feels better. States that the flare up has calmed down.  She was compliant with home exercise program.  Response to previous treatment: appropriate muscle response  Functional change: ongoing; ambulating with rollator    Pain: 2/10, currently  Location: bilateral knees    Patient goals: improve fitness    OBJECTIVE     Objective Measures updated at progress report unless specified.     Treatment     *Per Medicaid guidelines all therapy billed as therex*  *PT one-on-one with patient for entire session with PT extender utilized for remainder of therapy session*     Vicky received the treatments listed below:      therapeutic exercises to develop strength, endurance, ROM, flexibility, posture, and core stabilization for 47 minutes including:  Ambulation to and from waiting room with rollator and therapist Supervision  Nustep for 15 minutes, Level 1 for endurance (~1,300 steps)  Sit to stands from std chair + airex; 2 x 8 reps - cues for nose over toes  Lateral walks at  half wall (12 feet); 3 laps + 2 lb AW  Long arc quads at EOM + 2 lb AW; 10 second hold, 3 x 10 reps Bilateral  Seated heel raises; 3 x 10 reps + 2 lb AW  Seated march + 2 lb AW; 3 minutes, alternating  NBOS in // bars; 1 minute, 3 trials - held  Step ups in // bars onto 2-inch step; 2 x 8 reps Bilateral - held    Patient Education and Home Exercises     Home Exercises Provided and Patient Education Provided     Education provided:   - compliance with HEP  - plan of care  - prognosis  - importance of exercise and functional mobility    Written Home Exercises Provided: Patient instructed to cont prior HEP. Exercises were reviewed and Vicky was able to demonstrate them prior to the end of the session.  Vicky demonstrated good  understanding of the education provided. See EMR under Patient Instructions for exercises provided during therapy sessions    ASSESSMENT     Modified treatment session based on subjective complaints. Overall good tolerance to therapeutic interventions performed. Notes Left knee pain with sit to stands. Will continue per POC towards treatment goals.  Vicky Is progressing well towards her goals.   Pt prognosis is Fair.     Pt will continue to benefit from skilled outpatient physical therapy to address the deficits listed in the problem list box on initial evaluation, provide pt/family education and to maximize pt's level of independence in the home and community environment.     Pt's spiritual, cultural and educational needs considered and pt agreeable to plan of care and goals.     Anticipated barriers to physical therapy: chronicity, age, co-morbidities    Goals:   Short Term Goals (6 Weeks): - Appropriate, ongoing  1. Patient will be independent with home exercise program to supplement physical therapy treatment in improving functional status.  2. Pt will improve impaired lower extremity manual muscle tests to >/= 4-/5 to improve dynamic lower extremity support for closed chain tasks.  3.  Patient will perform 3 consecutive hip hinges with less than 3 VC to demonstrate improved lumbopelvic movement for safety during household ADL's and improved lifting techniques.   4. Patient will report decreased pain with activity 3/10 to demonstrate improved tolerance for activity and household ADL's.     Long Term Goals (10 Weeks): - Appropriate, ongoing  1. Pt will improve impaired lower extremity manual muscle tests to >/= 4/5 to improve dynamic lower extremity support for closed chain tasks.  2. Patient will improve the total FOTO MSK Survey Score to </= 30% limited to demonstrate increased perceived functional mobility.  3. Patient will perform at least 10 sit to stands in 30 seconds without UE support to demonstrate increased functional strength.   4. Patient will perform 3 consecutive hip hinges with less than 0VC to demonstrate improved lumbopelvic movement for safety during household ADL's and improved lifting techniques.   5. Patient will initiate walking program to demonstrate improved activity tolerance.  6. Patient will perform timed up and go with least restrictive assistive device in < 10 seconds to improve gait speed and safety with community ambulation.  7. Patient will perform at least 8-10 sit to stands in 30 seconds without UE support to demonstrate increased functional strength.     PLAN     Plan of Care Certification: 10/7/2024 to 12/20/2024.     Outpatient Physical Therapy 2 times weekly for 10 weeks to include the following interventions: Aquatic Therapy, Cervical/Lumbar Traction, Electrical Stimulation TENS, Gait Training, Manual Therapy, Moist Heat/ Ice, Neuromuscular Re-ed, Orthotic Management and Training, Patient Education, Self Care, Therapeutic Activities, Therapeutic Exercise, and functional dry needling    PT/PTA met face to face to discuss patient's treatment plan and progress towards established goals. Patient will be seen by physical therapist every sixth visit and minimally  once per month.    Marah Vidales, PTA

## 2024-11-12 ENCOUNTER — OFFICE VISIT (OUTPATIENT)
Dept: HEPATOLOGY | Facility: CLINIC | Age: 59
End: 2024-11-12
Attending: INTERNAL MEDICINE
Payer: MEDICAID

## 2024-11-12 VITALS
TEMPERATURE: 98 F | RESPIRATION RATE: 18 BRPM | OXYGEN SATURATION: 98 % | DIASTOLIC BLOOD PRESSURE: 77 MMHG | HEART RATE: 57 BPM | SYSTOLIC BLOOD PRESSURE: 136 MMHG | BODY MASS INDEX: 51.91 KG/M2 | HEIGHT: 63 IN | WEIGHT: 293 LBS

## 2024-11-12 DIAGNOSIS — K74.60 CIRRHOSIS OF LIVER WITH ASCITES, UNSPECIFIED HEPATIC CIRRHOSIS TYPE: Primary | Chronic | ICD-10-CM

## 2024-11-12 DIAGNOSIS — K76.0 METABOLIC DYSFUNCTION-ASSOCIATED STEATOTIC LIVER DISEASE (MASLD): Chronic | ICD-10-CM

## 2024-11-12 DIAGNOSIS — R18.8 CIRRHOSIS OF LIVER WITH ASCITES, UNSPECIFIED HEPATIC CIRRHOSIS TYPE: Primary | Chronic | ICD-10-CM

## 2024-11-12 PROCEDURE — 1159F MED LIST DOCD IN RCRD: CPT | Mod: CPTII,,, | Performed by: INTERNAL MEDICINE

## 2024-11-12 PROCEDURE — 4010F ACE/ARB THERAPY RXD/TAKEN: CPT | Mod: CPTII,,, | Performed by: INTERNAL MEDICINE

## 2024-11-12 PROCEDURE — 3078F DIAST BP <80 MM HG: CPT | Mod: CPTII,,, | Performed by: INTERNAL MEDICINE

## 2024-11-12 PROCEDURE — 3075F SYST BP GE 130 - 139MM HG: CPT | Mod: CPTII,,, | Performed by: INTERNAL MEDICINE

## 2024-11-12 PROCEDURE — 3044F HG A1C LEVEL LT 7.0%: CPT | Mod: CPTII,,, | Performed by: INTERNAL MEDICINE

## 2024-11-12 PROCEDURE — 99215 OFFICE O/P EST HI 40 MIN: CPT | Mod: PBBFAC | Performed by: INTERNAL MEDICINE

## 2024-11-12 PROCEDURE — 1160F RVW MEDS BY RX/DR IN RCRD: CPT | Mod: CPTII,,, | Performed by: INTERNAL MEDICINE

## 2024-11-12 PROCEDURE — 99213 OFFICE O/P EST LOW 20 MIN: CPT | Mod: S$PBB,,, | Performed by: INTERNAL MEDICINE

## 2024-11-12 PROCEDURE — 99999 PR PBB SHADOW E&M-EST. PATIENT-LVL V: CPT | Mod: PBBFAC,,, | Performed by: INTERNAL MEDICINE

## 2024-11-12 PROCEDURE — 3008F BODY MASS INDEX DOCD: CPT | Mod: CPTII,,, | Performed by: INTERNAL MEDICINE

## 2024-11-12 RX ORDER — SPIRONOLACTONE 50 MG/1
50 TABLET, FILM COATED ORAL DAILY
Qty: 30 TABLET | Refills: 6 | Status: SHIPPED | OUTPATIENT
Start: 2024-11-12 | End: 2025-06-10

## 2024-11-12 RX ORDER — LACTULOSE 10 G/15ML
10 SOLUTION ORAL; RECTAL 2 TIMES DAILY
Qty: 900 ML | Refills: 6 | Status: SHIPPED | OUTPATIENT
Start: 2024-11-12

## 2024-11-12 NOTE — PROGRESS NOTES
HEPATOLOGY FOLLOW UP    Referring Physician:   Current Corresponding Physician: Dr. Gordy Perry Virginie Alvarado is here for follow up of Cirrhosis, Ascites, and Fatty Liver      HPI  This 59-year-old woman has decompensated cirrhosis secondary to metabolic dysfunction associated steatotic liver disease.  Her primary complication has been troublesome ascites.  She is now on a weekly large volume paracentesis schedule in addition to combination diuretics in the form of Lasix and furosemide.  She has hepatic encephalopathy currently well controlled on lactulose.  Her last upper endoscopy showed no varices.  She has three-vessel coronary artery disease and a history of atrial fibrillation.  Her meld score is 8.  She has no lower extremity edema.  Outpatient Encounter Medications as of 11/12/2024   Medication Sig Dispense Refill    albuterol (VENTOLIN HFA) 90 mcg/actuation inhaler Inhale 2 puffs into the lungs every 6 (six) hours as needed for Wheezing. Rescue 18 g 6    ALPRAZolam (XANAX) 0.5 MG tablet Take 1 tablet (0.5 mg total) by mouth 2 (two) times daily as needed for Anxiety. 60 tablet 4    b complex vitamins capsule Take 1 capsule by mouth every other day.      DULoxetine (CYMBALTA) 60 MG capsule Take 1 capsule (60 mg total) by mouth once daily. 180 capsule 3    ferrous sulfate 325 (65 FE) MG EC tablet Take 1 tablet (325 mg total) by mouth every other day. 15 tablet 2    furosemide (LASIX) 20 MG tablet Take 1 tablet (20 mg total) by mouth once daily. 30 tablet 2    lactulose (CHRONULAC) 10 gram/15 mL solution Take 15 mLs (10 g total) by mouth 2 (two) times daily. Please ensure you are having at least 2-3 bowel movements every day. 900 mL 2    metoprolol succinate (TOPROL-XL) 25 MG 24 hr tablet Take 3 tablets (75 mg total) by mouth 2 (two) times daily. 180 tablet 2    midodrine (PROAMATINE) 5 MG Tab Take 3 tablets (15 mg total) by mouth 3 (three) times daily. 270 tablet 2    multivitamin (THERAGRAN) per  tablet Take 1 tablet by mouth once daily.      nystatin (MYCOSTATIN) powder Apply topically 2 (two) times daily. 60 g 1    omeprazole (PRILOSEC) 20 MG capsule TAKE 1 CAPSULE BY MOUTH ONCE DAILY 90 capsule 3    ondansetron (ZOFRAN-ODT) 4 MG TbDL Take 2 tablets (8 mg total) by mouth every 8 (eight) hours as needed (nasuea, vomiting). 30 tablet 1    oxyCODONE (ROXICODONE) 5 MG immediate release tablet Take 1 tablet (5 mg total) by mouth every 12 (twelve) hours as needed for Pain. 40 tablet 0    spironolactone (ALDACTONE) 50 MG tablet Take 1 tablet (50 mg total) by mouth once daily. 30 tablet 2    [DISCONTINUED] medroxyPROGESTERone (PROVERA) 10 MG tablet Take 2 tablets (20 mg total) by mouth 3 (three) times daily. 180 tablet 0    [DISCONTINUED] nystatin (MYCOSTATIN) powder Apply topically 2 (two) times daily. 60 g 1    [DISCONTINUED] ondansetron (ZOFRAN-ODT) 4 MG TbDL Take 2 tablets (8 mg total) by mouth every 8 (eight) hours as needed (nasuea, vomiting). 30 tablet 1    [DISCONTINUED] oxyCODONE (ROXICODONE) 5 MG immediate release tablet Take 1 tablet (5 mg total) by mouth every 12 (twelve) hours as needed for Pain. 40 tablet 0    [DISCONTINUED] albumin human 25% bottle        No facility-administered encounter medications on file as of 11/12/2024.     Review of patient's allergies indicates:   Allergen Reactions    Flecainide Shortness Of Breath and Swelling    Shellfish containing products Other (See Comments)     Makes gout terrible    Vancomycin analogues Hives, Itching and Rash     Past Medical History:   Diagnosis Date    Anticoagulant long-term use     Arthritis     Atrial fibrillation     cardioversion    Atrial fibrillation with RVR     HAS WATCHMAN IN PLACE NOW    Avascular necrosis     L hand    Cellulitis of extremity 05/07/2022    CHF (congestive heart failure)     Chronic fatigue     Cirrhosis of liver with ascites     Depression     Diabetes mellitus     Encounter for blood transfusion 07/22/2020     Encounter for blood transfusion 03/2022    Fatty liver     GERD (gastroesophageal reflux disease)     Hx of psychiatric care     Hypertension     Iron deficiency anemia 05/07/2022    TIBC 444 with saturated iron 8    Left leg cellulitis 05/07/2022    Liver disease     Obese     Pre-diabetes 05/07/2022    Psychiatric problem     Sleep apnea     wears cpap    SOB (shortness of breath) on exertion     Weight loss     75lb intentional weight loss       Review of Systems   Constitutional:  Positive for fatigue. Negative for appetite change and unexpected weight change.   HENT:  Negative for ear pain, hearing loss, sore throat and trouble swallowing.    Eyes:  Negative for visual disturbance.   Respiratory:  Negative for cough and shortness of breath.    Cardiovascular:  Negative for chest pain and palpitations.   Gastrointestinal:  Positive for abdominal distention. Negative for abdominal pain, nausea and vomiting.   Genitourinary:  Negative for difficulty urinating and dysuria.   Musculoskeletal:  Negative for arthralgias and back pain.   Skin:  Negative for rash.   Neurological:  Positive for weakness. Negative for tremors, seizures and headaches.   Psychiatric/Behavioral:  Negative for agitation and decreased concentration.      Vitals:    11/12/24 0905   BP: 136/77   Pulse: (!) 57   Resp: 18   Temp: 97.7 °F (36.5 °C)       Physical Exam  Constitutional:       Appearance: She is well-developed. She is not diaphoretic.   HENT:      Right Ear: External ear normal.      Left Ear: External ear normal.   Eyes:      General: No scleral icterus.  Cardiovascular:      Rate and Rhythm: Normal rate and regular rhythm.      Heart sounds: Normal heart sounds. No murmur heard.     No friction rub. No gallop.   Pulmonary:      Effort: Pulmonary effort is normal. No respiratory distress.      Breath sounds: Normal breath sounds. No wheezing.   Abdominal:      General: Bowel sounds are normal. There is no distension.      Palpations:  Abdomen is soft. There is shifting dullness and fluid wave. There is no mass.      Tenderness: There is no abdominal tenderness.   Musculoskeletal:         General: Normal range of motion.   Lymphadenopathy:      Cervical: No cervical adenopathy.   Skin:     General: Skin is warm and dry.      Coloration: Skin is not pale.   Neurological:      Mental Status: She is alert and oriented to person, place, and time.         MELD 3.0: 8 at 11/6/2024  9:14 AM  MELD-Na: 6 at 11/6/2024  9:14 AM  Calculated from:  Serum Creatinine: 1 mg/dL at 11/6/2024  9:14 AM  Serum Sodium: 138 mmol/L (Using max of 137 mmol/L) at 11/6/2024  9:14 AM  Total Bilirubin: 0.3 mg/dL (Using min of 1 mg/dL) at 11/6/2024  9:14 AM  Serum Albumin: 3.1 g/dL at 11/6/2024  9:14 AM  INR(ratio): 1 at 11/6/2024  9:14 AM  Age at listing (hypothetical): 59 years  Sex: Female at 11/6/2024  9:14 AM      Lab Results   Component Value Date    GLU 85 11/06/2024    BUN 25 (H) 11/06/2024    CREATININE 1.0 11/06/2024    CALCIUM 9.4 11/06/2024     11/06/2024    K 4.3 11/06/2024     11/06/2024    PROT 7.4 11/06/2024    CO2 22 (L) 11/06/2024    ANIONGAP 12 11/06/2024    WBC 5.67 11/06/2024    RBC 3.83 (L) 11/06/2024    HGB 10.8 (L) 11/06/2024    HCT 34.4 (L) 11/06/2024    HCT 27 (L) 09/10/2024    MCV 90 11/06/2024    MCH 28.2 11/06/2024    MCHC 31.4 (L) 11/06/2024     Lab Results   Component Value Date    RDW 14.9 (H) 11/06/2024     11/06/2024    MPV 9.7 11/06/2024    GRAN 3.5 11/06/2024    GRAN 62.3 11/06/2024    LYMPH 1.1 11/06/2024    LYMPH 19.0 11/06/2024    MONO 0.8 11/06/2024    MONO 13.4 11/06/2024    EOSINOPHIL 3.0 11/06/2024    BASOPHIL 1.2 11/06/2024    EOS 0.2 11/06/2024    EOS 1 09/17/2024    BASO 0.07 11/06/2024    APTT 29.2 06/09/2023    GROUPTRH O NEG 06/12/2023     (H) 09/10/2024    CHOL 110 (L) 12/20/2023    TRIG 144 12/20/2023    HDL 31 (L) 12/20/2023    CHOLHDL 28.2 12/20/2023    TOTALCHOLEST 3.5 12/20/2023    ALBUMIN 3.1 (L)  11/06/2024    BILIDIR 0.8 (H) 08/02/2024    AST 28 11/06/2024    ALT 10 11/06/2024    ALKPHOS 222 (H) 11/06/2024    MG 1.7 09/18/2024    LABPROT 10.7 11/06/2024    INR 1.0 11/06/2024       Assessment and Plan:  Patient Active Problem List   Diagnosis    Opiate dependence    Opioid dependence in remission    Essential hypertension    Atrial Fibrillation (paroxysmal)    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    History of opioid abuse    Type 2 diabetes mellitus with diabetic polyneuropathy, without long-term current use of insulin    Right carpal tunnel syndrome    Weakness of right hand    ERENDIRA (obstructive sleep apnea)    Depression    Carpal tunnel syndrome    Ulnar neuropathy at elbow of left upper extremity    Range of motion deficit    Edema of hand    Pure hypercholesterolemia    Mild aortic stenosis    Microcytic anemia    Colon cancer screening    Vaginal bleeding    Gastroesophageal reflux disease without esophagitis    Recurrent major depressive disorder, in partial remission    Iron deficiency anemia    Volume overload    s/p RA-TLH/BSO    Laryngeal edema    Abnormal nuclear stress test    Elevated liver enzymes    Metabolic dysfunction-associated steatotic liver disease (MASLD)    Primary osteoarthritis of both knees    Left wrist pain    Stiffness of left wrist joint    Swelling of left wrist    Portal hypertension    Stage 3b chronic kidney disease    Coronary artery disease of native artery of native heart with stable angina pectoris    Anasarca    Irritant contact dermatitis due to fecal, urinary or dual incontinence    Hyponatremia    Anxiety    Pain syndrome, chronic    Cirrhosis of liver with ascites    Other ascites    Debility    Decreased strength of lower extremity    Decreased strength of upper extremity    Decreased functional mobility    Decreased functional activity tolerance     iVcky Alvarado is a 59 y.o. female withCirrhosis, Ascites, and Fatty  Liver    Impression:    Decompensated cirrhosis secondary to metabolic dysfunction associated steatotic liver disease complicated by troublesome ascites    Plan:   I have refilled her diuretics and lactulose.  I will enter her into a hepatoma surveillance program.  She will return to clinic in 6 months time with continued clinical, biochemical and ultrasound surveillance.  I have reordered her weekly paracenteses.

## 2024-11-13 ENCOUNTER — HOSPITAL ENCOUNTER (OUTPATIENT)
Dept: INTERVENTIONAL RADIOLOGY/VASCULAR | Facility: HOSPITAL | Age: 59
Discharge: HOME | End: 2024-11-13
Attending: INTERNAL MEDICINE
Payer: MEDICAID

## 2024-11-13 VITALS
HEART RATE: 59 BPM | DIASTOLIC BLOOD PRESSURE: 82 MMHG | RESPIRATION RATE: 18 BRPM | OXYGEN SATURATION: 100 % | SYSTOLIC BLOOD PRESSURE: 138 MMHG

## 2024-11-13 DIAGNOSIS — K74.69 OTHER CIRRHOSIS OF LIVER: Chronic | ICD-10-CM

## 2024-11-13 PROCEDURE — 49083 ABD PARACENTESIS W/IMAGING: CPT | Mod: ,,, | Performed by: PHYSICIAN ASSISTANT

## 2024-11-13 PROCEDURE — 49083 ABD PARACENTESIS W/IMAGING: CPT | Performed by: STUDENT IN AN ORGANIZED HEALTH CARE EDUCATION/TRAINING PROGRAM

## 2024-11-13 NOTE — PLAN OF CARE
Procedure completed. Patient drained 4.1L.  Patient tolerated well; VSS. LLQ Site CDI. [facility].

## 2024-11-13 NOTE — H&P
Radiology History & Physical      SUBJECTIVE:     Chief Complaint: abdominal distention    History of Present Illness:  Vicky Alvarado is a 59 y.o. female who presents for ultrasound guided paracentesis  Past Medical History:   Diagnosis Date    Anticoagulant long-term use     Arthritis     Atrial fibrillation     cardioversion    Atrial fibrillation with RVR     HAS WATCHMAN IN PLACE NOW    Avascular necrosis     L hand    Cellulitis of extremity 05/07/2022    CHF (congestive heart failure)     Chronic fatigue     Cirrhosis of liver with ascites     Depression     Diabetes mellitus     Encounter for blood transfusion 07/22/2020    Encounter for blood transfusion 03/2022    Fatty liver     GERD (gastroesophageal reflux disease)     Hx of psychiatric care     Hypertension     Iron deficiency anemia 05/07/2022    TIBC 444 with saturated iron 8    Left leg cellulitis 05/07/2022    Liver disease     Obese     Pre-diabetes 05/07/2022    Psychiatric problem     Sleep apnea     wears cpap    SOB (shortness of breath) on exertion     Weight loss     75lb intentional weight loss     Past Surgical History:   Procedure Laterality Date    ABLATION OF ARRHYTHMOGENIC FOCUS FOR ATRIAL FIBRILLATION N/A 07/20/2020    Procedure: Ablation atrial fibrillation;  Surgeon: Gabriel Hawley MD;  Location: [facility] EP LAB;  Service: Cardiology;  Laterality: N/A;  afib, PVI, RFA, REENA, SHADY, anes, MB, 3 Prep    ABLATION OF ARRHYTHMOGENIC FOCUS FOR ATRIAL FIBRILLATION N/A 01/24/2022    Procedure: Ablation atrial fibrillation;  Surgeon: Gabriel Hawley MD;  Location: [facility] EP LAB;  Service: Cardiology;  Laterality: N/A;  afib/afl, PVI (re-do)/CTI, RFA, REENA (cx if SR), SHADY, anes, MB, 3 Prep    APPLICATION OF WOUND VACUUM-ASSISTED CLOSURE DEVICE Left 08/03/2020    Procedure: APPLICATION, WOUND VAC;  Surgeon: SEMAJ Márquez III, MD;  Location: [facility] OR [address] [place] [address];  Service: Peripheral Vascular;  Laterality: Left;    APPLICATION OF WOUND  VACUUM-ASSISTED CLOSURE DEVICE Left 08/06/2020    Procedure: APPLICATION, WOUND VAC;  Surgeon: SEMAJ Márquez III, MD;  Location: [facility] OR 2ND FLR;  Service: Peripheral Vascular;  Laterality: Left;    CARPAL TUNNEL RELEASE Right 06/10/2020    Procedure: RELEASE, CARPAL TUNNEL - RIGHT;  Surgeon: Adelaida Hall MD;  Location: [facility] [place] OR;  Service: Orthopedics;  Laterality: Right;  GENERAL AND REGIONAL    CARPAL TUNNEL RELEASE Left 05/05/2021    Procedure: RELEASE, CARPAL TUNNEL, LEFT;  Surgeon: Adelaida Hall MD;  Location: [facility] [place] OR;  Service: Orthopedics;  Laterality: Left;  GENERAL & REGIONAL IN PACU    CARPECTOMY Left 09/06/2023    Procedure: CARPECTOMY;  Surgeon: Adelaida Hall MD;  Location: [facility] [place] OR;  Service: Orthopedics;  Laterality: Left;  MAC/REGIONAL  LEFT PROXIMAL ROW    CLOSURE OF WOUND Left 08/06/2020    Procedure: CLOSURE, WOUND;  Surgeon: SEMAJ Márquez III, MD;  Location: [facility] OR 2ND FLR;  Service: Peripheral Vascular;  Laterality: Left;  Complex    COLONOSCOPY N/A 08/17/2021    Procedure: COLONOSCOPY Suprep;  Surgeon: Loco Deluca MD;  Location: [facility];  Service: Endoscopy;  Laterality: N/A;    ECHOCARDIOGRAM,TRANSESOPHAGEAL N/A 07/24/2023    Procedure: Transesophageal echo (REENA) intra-procedure log documentation;  Surgeon: Leyla Diagnostic Provider;  Location: [facility] EP LAB;  Service: Cardiology;  Laterality: N/A;  s/p WM, REENA, anes, 3 Prep    ESOPHAGOGASTRODUODENOSCOPY N/A 08/17/2021    Procedure: EGD (ESOPHAGOGASTRODUODENOSCOPY);  Surgeon: Loco Deluca MD;  Location: [facility] ENDO;  Service: Endoscopy;  Laterality: N/A;  Patient is schedule to have her Covid test on 08/14/2021 at the [facility] @ 9:30am. [place].    EXPLORATION OF FEMORAL ARTERY Left 07/21/2020    Procedure: EXPLORATION, ARTERY, FEMORAL;  Surgeon: SEMAJ Márquez III, MD;  Location: [facility] OR 2ND FLR;  Service: Peripheral Vascular;  Laterality: Left;  1. Urgent Direct repair, L SFA branch  laceration    FOOT SURGERY      HYSTEROSCOPY WITH DILATION AND CURETTAGE OF UTERUS N/A 02/19/2022    Procedure: HYSTEROSCOPY, WITH DILATION AND CURETTAGE OF UTERUS;  Surgeon: Shane Palomo MD;  Location: [address];  Service: OB/GYN;  Laterality: N/A;    INCISION AND DRAINAGE OF KNEE Left 05/12/2022    Procedure: INCISION AND DRAINAGE, Prepatellar bursectomy.;  Surgeon: Dre Guzmán MD;  [address];  Service: Orthopedics;  Laterality: Left;    LEFT HEART CATHETERIZATION Left 02/07/2023    Procedure: Left heart cath;  Surgeon: Josiah Terry MD;  Location: [facility] CATH LAB;  Service: Cardiology;  Laterality: Left;  borderline moderate bleeding risk 6.3%    OCCLUSION OF LEFT ATRIAL APPENDAGE N/A 06/12/2023    Procedure: Left atrial appendage occlusion;  Surgeon: Gabriel Hawley MD;  Location: [facility] EP LAB;  Service: Cardiology;  Laterality: N/A;  afib, watchman, BSCI, demi, ALIYA ibarra, 3prep    RELEASE OF ULNAR NERVE AT CUBITAL TUNNEL Bilateral     RIGHT HEART CATHETERIZATION Right 08/05/2024    Procedure: INSERTION, CATHETER, RIGHT HEART;  Surgeon: Zane Liu MD;  Location: [facility] CATH LAB;  Service: Cardiology;  Laterality: Right;    ROBOT-ASSISTED LAPAROSCOPIC ABDOMINAL HYSTERECTOMY USING DA KEIRY XI N/A 08/09/2022    Procedure: XI ROBOTIC HYSTERECTOMY;  Surgeon: Yolanda Barriga MD;  Location: [facility];  Service: OB/GYN;  Laterality: N/A;  dual console requested    ROBOT-ASSISTED LAPAROSCOPIC SALPINGO-OOPHORECTOMY Bilateral 08/09/2022    Procedure: ROBOTIC SALPINGO-OOPHORECTOMY;  Surgeon: Yolanda Barriga MD;  Location: [facility];  Service: OB/GYN;  Laterality: Bilateral;    TRANSESOPHAGEAL ECHOCARDIOGRAPHY N/A 06/12/2023    Procedure: ECHOCARDIOGRAM, TRANSESOPHAGEAL;  Surgeon: Cyril Mast MD;  Location: [facility] EP LAB;  Service: Cardiology;  Laterality: N/A;    TREATMENT OF CARDIAC ARRHYTHMIA N/A 01/29/2020    Procedure: CARDIOVERSION;  Surgeon: Gabriel Hawley MD;  Location:  [facility] EP LAB;  Service: Cardiology;  Laterality: N/A;  af, demi, dccv, anes, mb, 345    TREATMENT OF CARDIAC ARRHYTHMIA  01/24/2022    Procedure: Cardioversion or Defibrillation;  Surgeon: Gabriel Hawley MD;  Location: [facility] EP LAB;  Service: Cardiology;;    WOUND DEBRIDEMENT Left 08/06/2020    Procedure: DEBRIDEMENT, WOUND;  Surgeon: SEMAJ Márquez III, MD;  Location: [facility] OR [address] [place] [address];  Service: Peripheral Vascular;  Laterality: Left;       Home Meds:   Prior to Admission medications    Medication Sig Start Date End Date Taking? Authorizing Provider   albuterol (VENTOLIN HFA) 90 mcg/actuation inhaler Inhale 2 puffs into the lungs every 6 (six) hours as needed for Wheezing. Rescue 12/14/23 12/13/24  Gordy Cordero MD   ALPRAZolam (XANAX) 0.5 MG tablet Take 1 tablet (0.5 mg total) by mouth 2 (two) times daily as needed for Anxiety. 9/4/24   Gordy Cordero MD   b complex vitamins capsule Take 1 capsule by mouth every other day.    Provider, Historical   DULoxetine (CYMBALTA) 60 MG capsule Take 1 capsule (60 mg total) by mouth once daily. 8/12/24   Brennon Nunez MD   ferrous sulfate 325 (65 FE) MG EC tablet Take 1 tablet (325 mg total) by mouth every other day. 9/18/24   Eugene Woodward MD   furosemide (LASIX) 20 MG tablet Take 1 tablet (20 mg total) by mouth once daily. 9/19/24 9/19/25  Eugene Woodward MD   lactulose (CHRONULAC) 10 gram/15 mL solution Take 15 mLs (10 g total) by mouth 2 (two) times daily. Please ensure you are having at least 2-3 bowel movements every day. 11/12/24   Kiko Saba MD   metoprolol succinate (TOPROL-XL) 25 MG 24 hr tablet Take 3 tablets (75 mg total) by mouth 2 (two) times daily. 9/18/24 12/17/24  Eugene Woodward MD   midodrine (PROAMATINE) 5 MG Tab Take 3 tablets (15 mg total) by mouth 3 (three) times daily. 9/18/24 12/17/24  Eugene Woodward MD   multivitamin (THERAGRAN) per tablet Take 1 tablet by mouth once daily.    Provider, Historical   nystatin (MYCOSTATIN) powder Apply topically  2 (two) times daily. 11/7/24   Gordy Cordero MD   omeprazole (PRILOSEC) 20 MG capsule TAKE 1 CAPSULE BY MOUTH ONCE DAILY 7/25/24   Gordy Cordero MD   ondansetron (ZOFRAN-ODT) 4 MG TbDL Take 2 tablets (8 mg total) by mouth every 8 (eight) hours as needed (nasuea, vomiting). 10/28/24   Gordy Cordero MD   oxyCODONE (ROXICODONE) 5 MG immediate release tablet Take 1 tablet (5 mg total) by mouth every 12 (twelve) hours as needed for Pain. 10/28/24   Gordy Cordero MD   spironolactone (ALDACTONE) 50 MG tablet Take 1 tablet (50 mg total) by mouth once daily. 11/12/24 6/10/25  Kiko Saba MD   medroxyPROGESTERone (PROVERA) 10 MG tablet Take 2 tablets (20 mg total) by mouth 3 (three) times daily. 7/13/22 8/9/22  Yolanda Barriga MD     Anticoagulants/Antiplatelets: no anticoagulation    Allergies:   Review of patient's allergies indicates:   Allergen Reactions    Flecainide Shortness Of Breath and Swelling    Shellfish containing products Other (See Comments)     Makes gout terrible    Vancomycin analogues Hives, Itching and Rash     Sedation History:  no adverse reactions    Review of Systems:   Hematological: no known coagulopathies  Respiratory: no shortness of breath  Cardiovascular: no chest pain  Gastrointestinal: no abdominal pain  Genito-Urinary: no dysuria  Musculoskeletal: negative  Neurological: no TIA or stroke symptoms         OBJECTIVE:     Vital Signs (Most Recent)       Physical Exam:  ASA: 2  Mallampati: n/a    General: no acute distress  Mental Status: alert and oriented to person, place and time  HEENT: normocephalic, atraumatic  Chest: unlabored breathing  Heart: regular heart rate  Abdomen: distended  Extremity: moves all extremities    ASSESSMENT/PLAN:     Sedation Plan: local  Patient will undergo ultrasound guided paracentesis.    Bushra Epps PA-C  Interventional Radiology   Spectra: [id number]

## 2024-11-13 NOTE — PROCEDURES
Radiology Post-Procedure Note    Pre Op Diagnosis: Ascites  Post Op Diagnosis: Same    Procedure: Ultrasound Guided Paracentesis    Procedure performed by: Bushra Epps PA-C    Written Informed Consent Obtained: Yes  Specimen Removed: YES, clear yellow fluid  Estimated Blood Loss: Minimal    Findings:   Successful paracentesis. Access obtained in the LLQ. Albumin administered PRN per protocol.    Patient tolerated procedure well.    Bushra Epps PA-C  Interventional Radiology   Spectra: [id number]

## 2024-11-15 ENCOUNTER — CLINICAL SUPPORT (OUTPATIENT)
Dept: REHABILITATION | Facility: HOSPITAL | Age: 59
End: 2024-11-15
Payer: MEDICAID

## 2024-11-15 DIAGNOSIS — R68.89 DECREASED FUNCTIONAL ACTIVITY TOLERANCE: ICD-10-CM

## 2024-11-15 DIAGNOSIS — R26.89 DECREASED FUNCTIONAL MOBILITY: ICD-10-CM

## 2024-11-15 DIAGNOSIS — R29.898 DECREASED STRENGTH OF LOWER EXTREMITY: Primary | ICD-10-CM

## 2024-11-15 DIAGNOSIS — R29.898 DECREASED STRENGTH OF UPPER EXTREMITY: ICD-10-CM

## 2024-11-15 PROCEDURE — 97110 THERAPEUTIC EXERCISES: CPT | Mod: CQ

## 2024-11-15 NOTE — PROGRESS NOTES
[facility]   Physical Therapy Treatment Note     Name: Vicky Alvarado  Clinic Number: [id number]    Therapy Diagnosis:   Encounter Diagnoses   Name Primary?    Decreased strength of lower extremity Yes    Decreased strength of upper extremity     Decreased functional mobility     Decreased functional activity tolerance      Physician: Sarah Harding PA-C    Visit Date: 11/15/2024    Physician Orders: PT Eval and Treat   Medical Diagnosis from Referral: R53.81 (ICD-10-CM) - Debility   Evaluation Date: 10/7/2024  Authorization Period Expiration: 10/7/2025  Plan of Care Expiration: 12/20/2024  Progress Note Due: 10th visit  Visit # / Visits authorized: 6 / 20 + eval  FOTO: 1/3    PTA Visit #: 1 / 5     Time In: 1100  Time Out: 1200  Total Treatment Time: 60 minutes  Total Billable Time: 60 minutes (4 TE - Medicaid)    Precautions: Standard, Diabetes, Fall, and live cirrhosis A fib      SUBJECTIVE     Patient reports: she feels really good today.  She was compliant with home exercise program.  Response to previous treatment: appropriate muscle response  Functional change: ongoing; ambulating with rollator    Pain: 2/10, currently  Location: bilateral knees    Patient goals: improve fitness    OBJECTIVE     Objective Measures updated at progress report unless specified.     Treatment     *Per Medicaid guidelines all therapy billed as therex*  *PT one-on-one with patient for entire session with PT extender utilized for remainder of therapy session*     Vicky received the treatments listed below:      therapeutic exercises to develop strength, endurance, ROM, flexibility, posture, and core stabilization for 60 minutes including:  *BOLD exercises performed today  Ambulation to and from waiting room with rollator and therapist Supervision  Nustep for 20 minutes, Level 1 for endurance (~1,806 steps)  Sit to stands from std chair + airex; 2 x 8 reps - cues for nose over toes   Lateral walks  at half wall (12 feet); 3 laps + 2 lb AW  Long arc quads at EOM + 2 lb AW; 10 second hold, 3 x 10 reps Bilateral  Seated heel raises; 3 x 10 reps + 2 lb AW  Seated march + 2 lb AW; 3 minutes, alternating  NBOS in // bars; 1 minute, 3 trials   Step ups in // bars onto 2-inch step; 2 x 8 reps Bilateral    Patient Education and Home Exercises     Home Exercises Provided and Patient Education Provided     Education provided:   - compliance with HEP  - plan of care  - prognosis  - importance of exercise and functional mobility    Written Home Exercises Provided: Patient instructed to cont prior HEP. Exercises were reviewed and Vicky was able to demonstrate them prior to the end of the session.  Vicky demonstrated good  understanding of the education provided. See EMR under Patient Instructions for exercises provided during therapy sessions    ASSESSMENT     Overall good tolerance to therapeutic interventions noting adequate muscle response throughout. Able to complete 20 minutes and 1,800 steps on Nustep demonstrating improvement in CV and LE endurance. Will continue per POC towards treatment goals.  Vicky Is progressing well towards her goals.   Pt prognosis is Fair.     Pt will continue to benefit from skilled outpatient physical therapy to address the deficits listed in the problem list box on initial evaluation, provide pt/family education and to maximize pt's level of independence in the home and community environment.     Pt's spiritual, cultural and educational needs considered and pt agreeable to plan of care and goals.     Anticipated barriers to physical therapy: chronicity, age, co-morbidities    Goals:   Short Term Goals (6 Weeks): - Appropriate, ongoing  1. Patient will be independent with home exercise program to supplement physical therapy treatment in improving functional status.  2. Pt will improve impaired lower extremity manual muscle tests to >/= 4-/5 to improve dynamic lower extremity support for  closed chain tasks.  3. Patient will perform 3 consecutive hip hinges with less than 3 VC to demonstrate improved lumbopelvic movement for safety during household ADL's and improved lifting techniques.   4. Patient will report decreased pain with activity 3/10 to demonstrate improved tolerance for activity and household ADL's.     Long Term Goals (10 Weeks): - Appropriate, ongoing  1. Pt will improve impaired lower extremity manual muscle tests to >/= 4/5 to improve dynamic lower extremity support for closed chain tasks.  2. Patient will improve the total FOTO MSK Survey Score to </= 30% limited to demonstrate increased perceived functional mobility.  3. Patient will perform at least 10 sit to stands in 30 seconds without UE support to demonstrate increased functional strength.   4. Patient will perform 3 consecutive hip hinges with less than 0VC to demonstrate improved lumbopelvic movement for safety during household ADL's and improved lifting techniques.   5. Patient will initiate walking program to demonstrate improved activity tolerance.  6. Patient will perform timed up and go with least restrictive assistive device in < 10 seconds to improve gait speed and safety with community ambulation.  7. Patient will perform at least 8-10 sit to stands in 30 seconds without UE support to demonstrate increased functional strength.     PLAN     Plan of Care Certification: 10/7/2024 to 12/20/2024.     Outpatient Physical Therapy 2 times weekly for 10 weeks to include the following interventions: Aquatic Therapy, Cervical/Lumbar Traction, Electrical Stimulation TENS, Gait Training, Manual Therapy, Moist Heat/ Ice, Neuromuscular Re-ed, Orthotic Management and Training, Patient Education, Self Care, Therapeutic Activities, Therapeutic Exercise, and functional dry needling    PT/PTA met face to face to discuss patient's treatment plan and progress towards established goals. Patient will be seen by physical therapist every sixth  visit and minimally once per month.    Marah Vidales, PTA

## 2024-11-17 ENCOUNTER — PATIENT MESSAGE (OUTPATIENT)
Dept: HEPATOLOGY | Facility: CLINIC | Age: 59
End: 2024-11-17
Payer: MEDICAID

## 2024-11-18 ENCOUNTER — TELEPHONE (OUTPATIENT)
Dept: INTERVENTIONAL RADIOLOGY/VASCULAR | Facility: CLINIC | Age: 59
End: 2024-11-18
Payer: MEDICAID

## 2024-11-18 ENCOUNTER — CLINICAL SUPPORT (OUTPATIENT)
Dept: REHABILITATION | Facility: HOSPITAL | Age: 59
End: 2024-11-18
Payer: MEDICAID

## 2024-11-18 DIAGNOSIS — R26.89 DECREASED FUNCTIONAL MOBILITY: ICD-10-CM

## 2024-11-18 DIAGNOSIS — R29.898 DECREASED STRENGTH OF UPPER EXTREMITY: ICD-10-CM

## 2024-11-18 DIAGNOSIS — R29.898 DECREASED STRENGTH OF LOWER EXTREMITY: Primary | ICD-10-CM

## 2024-11-18 DIAGNOSIS — R68.89 DECREASED FUNCTIONAL ACTIVITY TOLERANCE: ICD-10-CM

## 2024-11-18 PROCEDURE — 97110 THERAPEUTIC EXERCISES: CPT

## 2024-11-18 NOTE — PROGRESS NOTES
[facility]   Physical Therapy Treatment Note     Name: Vicky Alvarado  Clinic Number: [id number]    Therapy Diagnosis:   Encounter Diagnoses   Name Primary?    Decreased strength of lower extremity Yes    Decreased strength of upper extremity     Decreased functional mobility     Decreased functional activity tolerance        Physician: Sarah Harding PA-C    Visit Date: 2024    Physician Orders: PT Eval and Treat   Medical Diagnosis from Referral: R53.81 (ICD-10-CM) - Debility   Evaluation Date: 10/7/2024  Authorization Period Expiration: 10/7/2025  Plan of Care Expiration: 2024  Progress Note Due: 10th visit  Visit # / Visits authorized:  + eval  FOTO: 1/3    PTA Visit #: 0/ 5     Time In: 2:30 pm  Time Out: 3:30 pm  Total Treatment Time: 60 minutes  Total Billable Time: 60 minutes (4 TE - Medicaid)    Precautions: Standard, Diabetes, Fall, and live cirrhosis A fib      SUBJECTIVE     Patient reports: doing well; notices improvements in her endurance and walking around.   She was compliant with home exercise program.  Response to previous treatment: appropriate muscle response  Functional change: ongoing; ambulating with rollator    Pain: 2/10, currently  Location: bilateral knees    Patient goals: improve fitness    OBJECTIVE     Objective Measures updated at progress report unless specified.     2024:  TU seconds  30 STS: 6x  FOTO: see media section  Treatment     *Per Medicaid guidelines all therapy billed as therex*  *PT one-on-one with patient for entire session with PT extender utilized for remainder of therapy session*     Vicky received the treatments listed below:      therapeutic exercises to develop strength, endurance, ROM, flexibility, posture, and core stabilization for 60 minutes including:  *BOLD exercises performed today  Ambulation to and from waiting room with rollator and therapist Supervision  Nustep for 20 minutes, Level 1 for  endurance (~1,806 steps)  Sit to stands from std chair + airex; 2 x 8 reps - cues for nose over toes   Lateral walks at half wall (12 feet); 3 laps + 3 lb AW  Long arc quads at EOM + 3 lb AW; 10 second hold, 3 x 10 reps Bilateral  Seated heel raises; 3 x 10 reps + 2 lb AW  Seated march + 3 lb AW; 3 minutes, alternating      NBOS in // bars; 1 minute, 3 trials   Step ups in // bars onto 2-inch step; 2 x 8 reps Bilateral    Patient Education and Home Exercises     Home Exercises Provided and Patient Education Provided     Education provided:   - compliance with HEP  - plan of care  - prognosis  - importance of exercise and functional mobility    Written Home Exercises Provided: Patient instructed to cont prior HEP. Exercises were reviewed and Vicky was able to demonstrate them prior to the end of the session.  Vicky demonstrated good  understanding of the education provided. See EMR under Patient Instructions for exercises provided during therapy sessions    ASSESSMENT     Improved FOTO score, TUG score and 30 STS since initial evaluation. Patient continues to progress towards short and longer term lower extremity strength and endurance goals at his time. Motor control, strength and endurance deficits remain limiting functional mobility when challenged. Vicky would continue to benefit from skilled outpatient physical therapy to address remaining short and long term physical therapy goals.     Vicky Is progressing well towards her goals.   Pt prognosis is Fair.     Pt will continue to benefit from skilled outpatient physical therapy to address the deficits listed in the problem list box on initial evaluation, provide pt/family education and to maximize pt's level of independence in the home and community environment.     Pt's spiritual, cultural and educational needs considered and pt agreeable to plan of care and goals.     Anticipated barriers to physical therapy: chronicity, age, co-morbidities    Goals:   Short  Term Goals (6 Weeks): - Appropriate, ongoing  1. Patient will be independent with home exercise program to supplement physical therapy treatment in improving functional status.  2. Pt will improve impaired lower extremity manual muscle tests to >/= 4-/5 to improve dynamic lower extremity support for closed chain tasks.  3. Patient will perform 3 consecutive hip hinges with less than 3 VC to demonstrate improved lumbopelvic movement for safety during household ADL's and improved lifting techniques.   4. Patient will report decreased pain with activity 3/10 to demonstrate improved tolerance for activity and household ADL's.     Long Term Goals (10 Weeks): - Appropriate, ongoing  1. Pt will improve impaired lower extremity manual muscle tests to >/= 4/5 to improve dynamic lower extremity support for closed chain tasks.  2. Patient will improve the total FOTO MSK Survey Score to </= 30% limited to demonstrate increased perceived functional mobility.  3. Patient will perform at least 10 sit to stands in 30 seconds without UE support to demonstrate increased functional strength.   4. Patient will perform 3 consecutive hip hinges with less than 0VC to demonstrate improved lumbopelvic movement for safety during household ADL's and improved lifting techniques.   5. Patient will initiate walking program to demonstrate improved activity tolerance.  6. Patient will perform timed up and go with least restrictive assistive device in < 10 seconds to improve gait speed and safety with community ambulation.  7. Patient will perform at least 8-10 sit to stands in 30 seconds without UE support to demonstrate increased functional strength.     PLAN     Plan of Care Certification: 10/7/2024 to 12/20/2024.     Outpatient Physical Therapy 2 times weekly for 10 weeks to include the following interventions: Aquatic Therapy, Cervical/Lumbar Traction, Electrical Stimulation TENS, Gait Training, Manual Therapy, Moist Heat/ Ice, Neuromuscular  Re-ed, Orthotic Management and Training, Patient Education, Self Care, Therapeutic Activities, Therapeutic Exercise, and functional dry needling    PT/PTA met face to face to discuss patient's treatment plan and progress towards established goals. Patient will be seen by physical therapist every sixth visit and minimally once per month.    Noemí Yuan, PT, DPT, OCS

## 2024-11-19 DIAGNOSIS — I48.0 PAROXYSMAL ATRIAL FIBRILLATION: Primary | ICD-10-CM

## 2024-11-25 ENCOUNTER — CLINICAL SUPPORT (OUTPATIENT)
Dept: REHABILITATION | Facility: HOSPITAL | Age: 59
End: 2024-11-25
Payer: MEDICAID

## 2024-11-25 DIAGNOSIS — R29.898 DECREASED STRENGTH OF UPPER EXTREMITY: ICD-10-CM

## 2024-11-25 DIAGNOSIS — R68.89 DECREASED FUNCTIONAL ACTIVITY TOLERANCE: ICD-10-CM

## 2024-11-25 DIAGNOSIS — R29.898 DECREASED STRENGTH OF LOWER EXTREMITY: Primary | ICD-10-CM

## 2024-11-25 DIAGNOSIS — R26.89 DECREASED FUNCTIONAL MOBILITY: ICD-10-CM

## 2024-11-25 PROCEDURE — 97110 THERAPEUTIC EXERCISES: CPT | Mod: CQ

## 2024-11-25 NOTE — PROGRESS NOTES
[facility]   Physical Therapy Treatment Note     Name: Vicky Alvarado  Clinic Number: [id number]    Therapy Diagnosis:   Encounter Diagnoses   Name Primary?    Decreased strength of lower extremity Yes    Decreased strength of upper extremity     Decreased functional mobility     Decreased functional activity tolerance      Physician: Sarah Harding PA-C    Visit Date: 2024    Physician Orders: PT Eval and Treat   Medical Diagnosis from Referral: R53.81 (ICD-10-CM) - Debility   Evaluation Date: 10/7/2024  Authorization Period Expiration: 10/7/2025  Plan of Care Expiration: 2024  Progress Note Due: 10th visit  Visit # / Visits authorized:  + eval  FOTO: 1/3    PTA Visit #:      Time In: 1400  Time Out: 1502  Total Treatment Time: 62 minutes  Total Billable Time: 62 minutes (4 TE - Medicaid)    Precautions: Standard, Diabetes, Fall, and live cirrhosis A fib      SUBJECTIVE     Patient reports: she is doing ok, no real pain. States that her knees feel a bit stiff, but was maybe the weather.  She was compliant with home exercise program.  Response to previous treatment: appropriate muscle response  Functional change: ongoing; ambulating with rollator    Pain: 2/10, currently  Location: bilateral knees    Patient goals: improve fitness    OBJECTIVE     Objective Measures updated at progress report unless specified.     2024:  TU seconds  30 STS: 6x  FOTO: see media section    Treatment     *Per Medicaid guidelines all therapy billed as therex*  *PT one-on-one with patient for entire session with PT extender utilized for remainder of therapy session*     Vicky received the treatments listed below:      therapeutic exercises to develop strength, endurance, ROM, flexibility, posture, and core stabilization for 62 minutes including:  Ambulation to and from waiting room with rollator and therapist Supervision  Nustep for 22 minutes, Level 1 for endurance  (~2,004 steps)   Sit to stands from std chair + airex; 3 x 10 reps - cues for nose over toes   Lateral walks at half wall (12 feet); 3 laps + 3 lb AW  Long arc quads at EOM + 3 lb AW; 10 second hold, 3 x 10 reps Bilateral  Seated heel raises; 3 x 10 reps + 3 lb AW  Seated march + 3 lb AW; 3 minutes, alternating    Not performed:  NBOS in // bars; 1 minute, 3 trials   Step ups in // bars onto 2-inch step; 2 x 8 reps Bilateral    Patient Education and Home Exercises     Home Exercises Provided and Patient Education Provided     Education provided:   - compliance with HEP  - plan of care  - prognosis  - importance of exercise and functional mobility    Written Home Exercises Provided: Patient instructed to cont prior HEP. Exercises were reviewed and Vicky was able to demonstrate them prior to the end of the session.  Vicky demonstrated good  understanding of the education provided. See EMR under Patient Instructions for exercises provided during therapy sessions    ASSESSMENT     Overall good tolerance to therapeutic interventions noting adequate muscle response throughout. Progressing well with current POC. She would benefit from addition of step ups and lisa navigation in future sessions to address functional ADL's. Will continue towards treatment goals.  Vicky Is progressing well towards her goals.   Pt prognosis is Fair.     Pt will continue to benefit from skilled outpatient physical therapy to address the deficits listed in the problem list box on initial evaluation, provide pt/family education and to maximize pt's level of independence in the home and community environment.     Pt's spiritual, cultural and educational needs considered and pt agreeable to plan of care and goals.     Anticipated barriers to physical therapy: chronicity, age, co-morbidities    Goals:   Short Term Goals (6 Weeks): - Appropriate, ongoing  1. Patient will be independent with home exercise program to supplement physical therapy  treatment in improving functional status.  2. Pt will improve impaired lower extremity manual muscle tests to >/= 4-/5 to improve dynamic lower extremity support for closed chain tasks.  3. Patient will perform 3 consecutive hip hinges with less than 3 VC to demonstrate improved lumbopelvic movement for safety during household ADL's and improved lifting techniques.   4. Patient will report decreased pain with activity 3/10 to demonstrate improved tolerance for activity and household ADL's.     Long Term Goals (10 Weeks): - Appropriate, ongoing  1. Pt will improve impaired lower extremity manual muscle tests to >/= 4/5 to improve dynamic lower extremity support for closed chain tasks.  2. Patient will improve the total FOTO MSK Survey Score to </= 30% limited to demonstrate increased perceived functional mobility.  3. Patient will perform at least 10 sit to stands in 30 seconds without UE support to demonstrate increased functional strength.   4. Patient will perform 3 consecutive hip hinges with less than 0VC to demonstrate improved lumbopelvic movement for safety during household ADL's and improved lifting techniques.   5. Patient will initiate walking program to demonstrate improved activity tolerance.  6. Patient will perform timed up and go with least restrictive assistive device in < 10 seconds to improve gait speed and safety with community ambulation.  7. Patient will perform at least 8-10 sit to stands in 30 seconds without UE support to demonstrate increased functional strength.     PLAN     Plan of Care Certification: 10/7/2024 to 12/20/2024.     Outpatient Physical Therapy 2 times weekly for 10 weeks to include the following interventions: Aquatic Therapy, Cervical/Lumbar Traction, Electrical Stimulation TENS, Gait Training, Manual Therapy, Moist Heat/ Ice, Neuromuscular Re-ed, Orthotic Management and Training, Patient Education, Self Care, Therapeutic Activities, Therapeutic Exercise, and functional dry  [place]    PT/PTA met face to face to discuss patient's treatment plan and progress towards established goals. Patient will be seen by physical therapist every sixth visit and minimally once per month.    Marah Vidales PTA

## 2024-12-02 ENCOUNTER — CLINICAL SUPPORT (OUTPATIENT)
Dept: REHABILITATION | Facility: HOSPITAL | Age: 59
End: 2024-12-02
Payer: MEDICAID

## 2024-12-02 DIAGNOSIS — R29.898 DECREASED STRENGTH OF UPPER EXTREMITY: ICD-10-CM

## 2024-12-02 DIAGNOSIS — R68.89 DECREASED FUNCTIONAL ACTIVITY TOLERANCE: ICD-10-CM

## 2024-12-02 DIAGNOSIS — R26.89 DECREASED FUNCTIONAL MOBILITY: ICD-10-CM

## 2024-12-02 DIAGNOSIS — R29.898 DECREASED STRENGTH OF LOWER EXTREMITY: Primary | ICD-10-CM

## 2024-12-02 PROCEDURE — 97110 THERAPEUTIC EXERCISES: CPT

## 2024-12-02 NOTE — PROGRESS NOTES
[facility]   Physical Therapy Treatment Note     Name: Vicky Alvarado  Clinic Number: [id number]    Therapy Diagnosis:   Encounter Diagnoses   Name Primary?    Decreased strength of lower extremity Yes    Decreased strength of upper extremity     Decreased functional mobility     Decreased functional activity tolerance      Physician: Sarah Harding PA-C    Visit Date: 2024    Physician Orders: PT Eval and Treat   Medical Diagnosis from Referral: R53.81 (ICD-10-CM) - Debility   Evaluation Date: 10/7/2024  Authorization Period Expiration: 10/7/2025  Plan of Care Expiration: 2024  Progress Note Due: 10th visit  Visit # / Visits authorized:  + eval  FOTO: 1/3    PTA Visit #: 0 / 5     Time In: 2:30 pm  Time Out: 3:30  Total Treatment Time: 60 minutes  Total Billable Time: 60 minutes (4 TE - Medicaid)    Precautions: Standard, Diabetes, Fall, and live cirrhosis A fib      SUBJECTIVE     Patient reports: feels she is getting stronger.   She was compliant with home exercise program.  Response to previous treatment: appropriate muscle response  Functional change: ongoing; ambulating with rollator    Pain: 2/10, currently  Location: bilateral knees    Patient goals: improve fitness    OBJECTIVE     Objective Measures updated at progress report unless specified.     2024:  TU seconds  30 STS: 6x  FOTO: see media section    Treatment     *Per Medicaid guidelines all therapy billed as therex*  *PT one-on-one with patient for entire session with PT extender utilized for remainder of therapy session*     Vicky received the treatments listed below:      therapeutic exercises to develop strength, endurance, ROM, flexibility, posture, and core stabilization for 60 minutes including:  Ambulation to and from waiting room with rollator and therapist Supervision  Nustep for 22 minutes, Level 1 for endurance (~2,004 steps)   Sit to stands from std chair + airex; 3 x 10  reps - cues for nose over toes =  Lateral walks at half wall (12 feet); 3 laps + 3 lb AW  Long arc quads at EOM + 3 lb AW; 10 second hold, 3 x 10 reps Bilateral  Seated heel raises; 3 x 10 reps + 3 lb AW  Seated march + 3 lb AW; 3 minutes, alternating  Step ups 4 inch B UE support    Not performed:  NBOS in // bars; 1 minute, 3 trials   Step ups in // bars onto 2-inch step; 2 x 8 reps Bilateral    Patient Education and Home Exercises     Home Exercises Provided and Patient Education Provided     Education provided:   - compliance with HEP  - plan of care  - prognosis  - importance of exercise and functional mobility    Written Home Exercises Provided: Patient instructed to cont prior HEP. Exercises were reviewed and Vicky was able to demonstrate them prior to the end of the session.  Vicky demonstrated good  understanding of the education provided. See EMR under Patient Instructions for exercises provided during therapy sessions    ASSESSMENT     Addition of step ups with good tolerance; no episodes of LOB. Progressing very well overall and continues to demonstrate improving LE endurance; minimal rest breaks.       Vicky Is progressing well towards her goals.   Pt prognosis is Fair.     Pt will continue to benefit from skilled outpatient physical therapy to address the deficits listed in the problem list box on initial evaluation, provide pt/family education and to maximize pt's level of independence in the home and community environment.     Pt's spiritual, cultural and educational needs considered and pt agreeable to plan of care and goals.     Anticipated barriers to physical therapy: chronicity, age, co-morbidities    Goals:   Short Term Goals (6 Weeks): - Appropriate, ongoing  1. Patient will be independent with home exercise program to supplement physical therapy treatment in improving functional status.  2. Pt will improve impaired lower extremity manual muscle tests to >/= 4-/5 to improve dynamic lower  extremity support for closed chain tasks.  3. Patient will perform 3 consecutive hip hinges with less than 3 VC to demonstrate improved lumbopelvic movement for safety during household ADL's and improved lifting techniques.   4. Patient will report decreased pain with activity 3/10 to demonstrate improved tolerance for activity and household ADL's.     Long Term Goals (10 Weeks): - Appropriate, ongoing  1. Pt will improve impaired lower extremity manual muscle tests to >/= 4/5 to improve dynamic lower extremity support for closed chain tasks.  2. Patient will improve the total FOTO MSK Survey Score to </= 30% limited to demonstrate increased perceived functional mobility.  3. Patient will perform at least 10 sit to stands in 30 seconds without UE support to demonstrate increased functional strength.   4. Patient will perform 3 consecutive hip hinges with less than 0VC to demonstrate improved lumbopelvic movement for safety during household ADL's and improved lifting techniques.   5. Patient will initiate walking program to demonstrate improved activity tolerance.  6. Patient will perform timed up and go with least restrictive assistive device in < 10 seconds to improve gait speed and safety with community ambulation.  7. Patient will perform at least 8-10 sit to stands in 30 seconds without UE support to demonstrate increased functional strength.     PLAN     Plan of Care Certification: 10/7/2024 to 12/20/2024.     Outpatient Physical Therapy 2 times weekly for 10 weeks to include the following interventions: Aquatic Therapy, Cervical/Lumbar Traction, Electrical Stimulation TENS, Gait Training, Manual Therapy, Moist Heat/ Ice, Neuromuscular Re-ed, Orthotic Management and Training, Patient Education, Self Care, Therapeutic Activities, Therapeutic Exercise, and functional dry needling    PT/PTA met face to face to discuss patient's treatment plan and progress towards established goals. Patient will be seen by physical  therapist every sixth visit and minimally once per month.    Noemí Yuan, PT, DPT, OCS

## 2024-12-04 ENCOUNTER — HOSPITAL ENCOUNTER (OUTPATIENT)
Dept: INTERVENTIONAL RADIOLOGY/VASCULAR | Facility: HOSPITAL | Age: 59
Discharge: HOME | End: 2024-12-04
Attending: INTERNAL MEDICINE
Payer: MEDICAID

## 2024-12-04 VITALS
OXYGEN SATURATION: 100 % | RESPIRATION RATE: 17 BRPM | DIASTOLIC BLOOD PRESSURE: 82 MMHG | HEART RATE: 58 BPM | SYSTOLIC BLOOD PRESSURE: 125 MMHG

## 2024-12-04 DIAGNOSIS — K74.60 CIRRHOSIS OF LIVER WITH ASCITES, UNSPECIFIED HEPATIC CIRRHOSIS TYPE: Chronic | ICD-10-CM

## 2024-12-04 DIAGNOSIS — R18.8 CIRRHOSIS OF LIVER WITH ASCITES, UNSPECIFIED HEPATIC CIRRHOSIS TYPE: Chronic | ICD-10-CM

## 2024-12-04 PROCEDURE — P9047 ALBUMIN (HUMAN), 25%, 50ML: HCPCS | Mod: JZ,JG | Performed by: INTERNAL MEDICINE

## 2024-12-04 PROCEDURE — 63600175 PHARM REV CODE 636 W HCPCS: Mod: JZ,JG | Performed by: INTERNAL MEDICINE

## 2024-12-04 PROCEDURE — 49083 ABD PARACENTESIS W/IMAGING: CPT | Mod: ,,, | Performed by: PHYSICIAN ASSISTANT

## 2024-12-04 PROCEDURE — 63600175 PHARM REV CODE 636 W HCPCS: Performed by: PHYSICIAN ASSISTANT

## 2024-12-04 RX ORDER — ALBUMIN HUMAN 250 G/1000ML
SOLUTION INTRAVENOUS
Status: COMPLETED | OUTPATIENT
Start: 2024-12-04 | End: 2024-12-04

## 2024-12-04 RX ORDER — LIDOCAINE HYDROCHLORIDE AND EPINEPHRINE 10; 10 UG/ML; MG/ML
INJECTION, SOLUTION INFILTRATION; PERINEURAL
Status: COMPLETED | OUTPATIENT
Start: 2024-12-04 | End: 2024-12-04

## 2024-12-04 RX ORDER — LIDOCAINE HYDROCHLORIDE 10 MG/ML
INJECTION, SOLUTION INFILTRATION; PERINEURAL
Status: COMPLETED | OUTPATIENT
Start: 2024-12-04 | End: 2024-12-04

## 2024-12-04 RX ORDER — ALBUMIN HUMAN 250 G/1000ML
SOLUTION INTRAVENOUS
Status: DISPENSED
Start: 2024-12-04 | End: 2024-12-04

## 2024-12-04 RX ADMIN — ALBUMIN (HUMAN) 37.5 G: 25 SOLUTION INTRAVENOUS at 11:12

## 2024-12-04 RX ADMIN — LIDOCAINE HYDROCHLORIDE 10 ML: 10 INJECTION, SOLUTION INFILTRATION; PERINEURAL at 10:12

## 2024-12-04 NOTE — PROCEDURES
Radiology Post-Procedure Note    Pre Op Diagnosis: Ascites  Post Op Diagnosis: Same    Procedure: Ultrasound Guided Paracentesis    Procedure performed by: Bushra Epps PA-C    Written Informed Consent Obtained: Yes  Specimen Removed: YES, clear yellow fluid  Estimated Blood Loss: Minimal    Findings:   Successful paracentesis. Access obtained in the LLQ. Albumin administered PRN per protocol.    Patient tolerated procedure well.    Bushra Epps PA-C  Interventional Radiology   Spectra: [id number]

## 2024-12-04 NOTE — H&P
Radiology History & Physical      SUBJECTIVE:     Chief Complaint: abdominal distention    History of Present Illness:  Vicky Alvarado is a 59 y.o. female who presents for ultrasound guided paracentesis  Past Medical History:   Diagnosis Date    Anticoagulant long-term use     Arthritis     Atrial fibrillation     cardioversion    Atrial fibrillation with RVR     HAS WATCHMAN IN PLACE NOW    Avascular necrosis     L hand    Cellulitis of extremity 05/07/2022    CHF (congestive heart failure)     Chronic fatigue     Cirrhosis of liver with ascites     Depression     Diabetes mellitus     Encounter for blood transfusion 07/22/2020    Encounter for blood transfusion 03/2022    Fatty liver     GERD (gastroesophageal reflux disease)     Hx of psychiatric care     Hypertension     Iron deficiency anemia 05/07/2022    TIBC 444 with saturated iron 8    Left leg cellulitis 05/07/2022    Liver disease     Obese     Pre-diabetes 05/07/2022    Psychiatric problem     Sleep apnea     wears cpap    SOB (shortness of breath) on exertion     Weight loss     75lb intentional weight loss     Past Surgical History:   Procedure Laterality Date    ABLATION OF ARRHYTHMOGENIC FOCUS FOR ATRIAL FIBRILLATION N/A 07/20/2020    Procedure: Ablation atrial fibrillation;  Surgeon: Gabriel Hawley MD;  Location: [facility] EP LAB;  Service: Cardiology;  Laterality: N/A;  afib, PVI, RFA, REENA, SHADY, anes, MB, 3 Prep    ABLATION OF ARRHYTHMOGENIC FOCUS FOR ATRIAL FIBRILLATION N/A 01/24/2022    Procedure: Ablation atrial fibrillation;  Surgeon: Gabriel Hawley MD;  Location: [facility] EP LAB;  Service: Cardiology;  Laterality: N/A;  afib/afl, PVI (re-do)/CTI, RFA, REENA (cx if SR), SHADY, anes, MB, 3 Prep    APPLICATION OF WOUND VACUUM-ASSISTED CLOSURE DEVICE Left 08/03/2020    Procedure: APPLICATION, WOUND VAC;  Surgeon: SEMAJ Márquez III, MD;  Location: [facility] OR [address] [place] [address];  Service: Peripheral Vascular;  Laterality: Left;    APPLICATION OF WOUND  VACUUM-ASSISTED CLOSURE DEVICE Left 08/06/2020    Procedure: APPLICATION, WOUND VAC;  Surgeon: SEMAJ Márquez III, MD;  Location: [facility] OR 2ND FLR;  Service: Peripheral Vascular;  Laterality: Left;    CARPAL TUNNEL RELEASE Right 06/10/2020    Procedure: RELEASE, CARPAL TUNNEL - RIGHT;  Surgeon: Adelaida Hall MD;  Location: [facility] OR;  Service: Orthopedics;  Laterality: Right;  GENERAL AND REGIONAL    CARPAL TUNNEL RELEASE Left 05/05/2021    Procedure: RELEASE, CARPAL TUNNEL, LEFT;  Surgeon: Adelaida Hall MD;  Location: [facility] OR;  Service: Orthopedics;  Laterality: Left;  GENERAL & REGIONAL IN PACU    CARPECTOMY Left 09/06/2023    Procedure: CARPECTOMY;  Surgeon: Adelaida Hall MD;  Location: [facility] OR;  Service: Orthopedics;  Laterality: Left;  MAC/REGIONAL  LEFT PROXIMAL ROW    CLOSURE OF WOUND Left 08/06/2020    Procedure: CLOSURE, WOUND;  Surgeon: SEMAJ Márquez III, MD;  Location: [facility] OR 2ND FLR;  Service: Peripheral Vascular;  Laterality: Left;  Complex    COLONOSCOPY N/A 08/17/2021    Procedure: COLONOSCOPY Suprep;  Surgeon: Loco Deluca MD;  Location: [facility];  Service: Endoscopy;  Laterality: N/A;    ECHOCARDIOGRAM,TRANSESOPHAGEAL N/A 07/24/2023    Procedure: Transesophageal echo (REENA) intra-procedure log documentation;  Surgeon: Leyla Diagnostic Provider;  Location: [facility] EP LAB;  Service: Cardiology;  Laterality: N/A;  s/p WM, REENA, anes, 3 Prep    ESOPHAGOGASTRODUODENOSCOPY N/A 08/17/2021    Procedure: EGD (ESOPHAGOGASTRODUODENOSCOPY);  Surgeon: Loco Deluca MD;  Location: [facility] ENDO;  Service: Endoscopy;  Laterality: N/A;  Patient is schedule to have her Covid test on 08/14/2021 at the [facility] @ 9:30am. [place].    EXPLORATION OF FEMORAL ARTERY Left 07/21/2020    Procedure: EXPLORATION, ARTERY, FEMORAL;  Surgeon: SEMAJ Márquez III, MD;  Location: [facility] OR 2ND FLR;  Service: Peripheral Vascular;  Laterality: Left;  1. Urgent Direct repair, L SFA branch  laceration    FOOT SURGERY      HYSTEROSCOPY WITH DILATION AND CURETTAGE OF UTERUS N/A 02/19/2022    Procedure: HYSTEROSCOPY, WITH DILATION AND CURETTAGE OF UTERUS;  Surgeon: Shane Palomo MD;  Location: [address];  Service: OB/GYN;  Laterality: N/A;    INCISION AND DRAINAGE OF KNEE Left 05/12/2022    Procedure: INCISION AND DRAINAGE, Prepatellar bursectomy.;  Surgeon: Dre Guzmán MD;  [address]  Service: Orthopedics;  Laterality: Left;    LEFT HEART CATHETERIZATION Left 02/07/2023    Procedure: Left heart cath;  Surgeon: Josiah Terry MD;  Location: [facility] CATH LAB;  Service: Cardiology;  Laterality: Left;  borderline moderate bleeding risk 6.3%    OCCLUSION OF LEFT ATRIAL APPENDAGE N/A 06/12/2023    Procedure: Left atrial appendage occlusion;  Surgeon: Gabriel Hawley MD;  Location: [facility] EP LAB;  Service: Cardiology;  Laterality: N/A;  afib, watchman, BSCI, demi, ALIYA ibarra, 3prep    RELEASE OF ULNAR NERVE AT CUBITAL TUNNEL Bilateral     RIGHT HEART CATHETERIZATION Right 08/05/2024    Procedure: INSERTION, CATHETER, RIGHT HEART;  Surgeon: Zane Liu MD;  Location: [facility] CATH LAB;  Service: Cardiology;  Laterality: Right;    ROBOT-ASSISTED LAPAROSCOPIC ABDOMINAL HYSTERECTOMY USING DA KEIRY XI N/A 08/09/2022    Procedure: XI ROBOTIC HYSTERECTOMY;  Surgeon: Yolanda Barriga MD;  Location: [facility];  Service: OB/GYN;  Laterality: N/A;  dual console requested    ROBOT-ASSISTED LAPAROSCOPIC SALPINGO-OOPHORECTOMY Bilateral 08/09/2022    Procedure: ROBOTIC SALPINGO-OOPHORECTOMY;  Surgeon: Yolanda Barriga MD;  Location: [facility];  Service: OB/GYN;  Laterality: Bilateral;    TRANSESOPHAGEAL ECHOCARDIOGRAPHY N/A 06/12/2023    Procedure: ECHOCARDIOGRAM, TRANSESOPHAGEAL;  Surgeon: Cyril Mast MD;  Location: [facility] EP LAB;  Service: Cardiology;  Laterality: N/A;    TREATMENT OF CARDIAC ARRHYTHMIA N/A 01/29/2020    Procedure: CARDIOVERSION;  Surgeon: Gabriel Hawley MD;  Location:  [facility] EP LAB;  Service: Cardiology;  Laterality: N/A;  af, demi, dccv, anes, mb, 345    TREATMENT OF CARDIAC ARRHYTHMIA  01/24/2022    Procedure: Cardioversion or Defibrillation;  Surgeon: Gabriel Hawley MD;  Location: [facility] EP LAB;  Service: Cardiology;;    WOUND DEBRIDEMENT Left 08/06/2020    Procedure: DEBRIDEMENT, WOUND;  Surgeon: SEMAJ Márquez III, MD;  Location: [facility] OR [address] [place] [address];  Service: Peripheral Vascular;  Laterality: Left;       Home Meds:   Prior to Admission medications    Medication Sig Start Date End Date Taking? Authorizing Provider   albuterol (VENTOLIN HFA) 90 mcg/actuation inhaler Inhale 2 puffs into the lungs every 6 (six) hours as needed for Wheezing. Rescue 12/14/23 12/13/24  Gordy Cordero MD   ALPRAZolam (XANAX) 0.5 MG tablet Take 1 tablet (0.5 mg total) by mouth 2 (two) times daily as needed for Anxiety. 9/4/24   Gordy Cordero MD   b complex vitamins capsule Take 1 capsule by mouth every other day.    Provider, Historical   DULoxetine (CYMBALTA) 60 MG capsule Take 1 capsule (60 mg total) by mouth once daily. 8/12/24   Brennon Nunez MD   ferrous sulfate 325 (65 FE) MG EC tablet Take 1 tablet (325 mg total) by mouth every other day. 9/18/24   Eugene Woodward MD   furosemide (LASIX) 20 MG tablet Take 1 tablet (20 mg total) by mouth once daily. 9/19/24 9/19/25  Eugene Woodward MD   lactulose (CHRONULAC) 10 gram/15 mL solution Take 15 mLs (10 g total) by mouth 2 (two) times daily. Please ensure you are having at least 2-3 bowel movements every day. 11/12/24   Kiko Saba MD   metoprolol succinate (TOPROL-XL) 25 MG 24 hr tablet Take 3 tablets (75 mg total) by mouth 2 (two) times daily. 9/18/24 12/20/24  Eugene Woodward MD   midodrine (PROAMATINE) 5 MG Tab Take 3 tablets (15 mg total) by mouth 3 (three) times daily. 9/18/24 12/17/24  Eugene Woodward MD   multivitamin (THERAGRAN) per tablet Take 1 tablet by mouth once daily.    Provider, Historical   nystatin (MYCOSTATIN) powder Apply topically  2 (two) times daily. 11/7/24   Gordy Cordero MD   omeprazole (PRILOSEC) 20 MG capsule TAKE 1 CAPSULE BY MOUTH ONCE DAILY 7/25/24   Gordy Cordero MD   ondansetron (ZOFRAN-ODT) 4 MG TbDL Take 2 tablets (8 mg total) by mouth every 8 (eight) hours as needed (nasuea, vomiting). 10/28/24   Gordy Cordero MD   oxyCODONE (ROXICODONE) 5 MG immediate release tablet Take 1 tablet (5 mg total) by mouth every 12 (twelve) hours as needed for Pain. 10/28/24   Gordy Cordero MD   spironolactone (ALDACTONE) 50 MG tablet Take 1 tablet (50 mg total) by mouth once daily. 11/12/24 6/10/25  Kiko Saba MD   medroxyPROGESTERone (PROVERA) 10 MG tablet Take 2 tablets (20 mg total) by mouth 3 (three) times daily. 7/13/22 8/9/22  Yolanda Barriga MD     Anticoagulants/Antiplatelets: no anticoagulation    Allergies:   Review of patient's allergies indicates:   Allergen Reactions    Flecainide Shortness Of Breath and Swelling    Shellfish containing products Other (See Comments)     Makes gout terrible    Vancomycin analogues Hives, Itching and Rash     Sedation History:  no adverse reactions    Review of Systems:   Hematological: no known coagulopathies  Respiratory: no shortness of breath  Cardiovascular: no chest pain  Gastrointestinal: no abdominal pain  Genito-Urinary: no dysuria  Musculoskeletal: negative  Neurological: no TIA or stroke symptoms         OBJECTIVE:     Vital Signs (Most Recent)       Physical Exam:  ASA: 2  Mallampati: n/a    General: no acute distress  Mental Status: alert and oriented to person, place and time  HEENT: normocephalic, atraumatic  Chest: unlabored breathing  Heart: regular heart rate  Abdomen: distended  Extremity: moves all extremities    ASSESSMENT/PLAN:     Sedation Plan: local  Patient will undergo ultrasound guided paracentesis.    Bushra Epps PA-C  Interventional Radiology   Spectra: [id number]

## 2024-12-04 NOTE — DISCHARGE INSTRUCTIONS
Questions or Concerns   - [phone] (MON-FRI 8 AM- 5PM).   - Radiology Resident on call [phone] (Weekends and After hours).  - IR Clinic [phone]

## 2024-12-04 NOTE — PLAN OF CARE
Paracentesis completed, pt tolerated well. No s/s of complications noted. 5050 ml removed from LLQ, mepore applied CDI. Albumin 150 ml given per protocol.  Discharge instructions reviewed and acknowledged by patient. Pt discharged via wheelchair, accompanied by family.     
Pt arrived to mpu room 1 for paracentesis, no acute distress noted. Orders and labs reviewed on chart. Awaiting consent.      
Statement Selected

## 2024-12-09 ENCOUNTER — CLINICAL SUPPORT (OUTPATIENT)
Dept: REHABILITATION | Facility: HOSPITAL | Age: 59
End: 2024-12-09
Payer: MEDICAID

## 2024-12-09 ENCOUNTER — DOCUMENTATION ONLY (OUTPATIENT)
Dept: REHABILITATION | Facility: HOSPITAL | Age: 59
End: 2024-12-09
Payer: MEDICAID

## 2024-12-09 DIAGNOSIS — R29.898 DECREASED STRENGTH OF UPPER EXTREMITY: ICD-10-CM

## 2024-12-09 DIAGNOSIS — R29.898 DECREASED STRENGTH OF LOWER EXTREMITY: Primary | ICD-10-CM

## 2024-12-09 DIAGNOSIS — R68.89 DECREASED FUNCTIONAL ACTIVITY TOLERANCE: ICD-10-CM

## 2024-12-09 DIAGNOSIS — R26.89 DECREASED FUNCTIONAL MOBILITY: ICD-10-CM

## 2024-12-09 NOTE — PROGRESS NOTES
Date: 12/9/2024  Time In: 1430  Time Out: 1500    Vicky presents for PT visit with visible redness and swelling present in Right lower leg and foot (see media). Patient states that she clipped her toenails about a week ago, but only started with symptoms yesterday morning. She reports waking up and feeling itchy, but thought it had to do with her liver stuff. States the redness and swelling has progressively worsened into today and she has pain with sitting and weightbearing. She describes the pain as an 8 out of 10 and states it is burning, sharp, stinging. She feels better when she elevates. Encouraged patient to reach out to MD or go to local Urgent Care regarding symptoms. She stated that she may try to soak or elevate at home. Therapist discouraged this and educated patient on risk associated with waiting to contact MD. Today's appointment with be canceled and no charges will be dropped. Next scheduled visit on Friday, 12/13 at 11AM.     Marah Vidales PTA

## 2024-12-11 ENCOUNTER — PATIENT MESSAGE (OUTPATIENT)
Dept: HEPATOLOGY | Facility: CLINIC | Age: 59
End: 2024-12-11
Payer: MEDICAID

## 2024-12-11 ENCOUNTER — TELEPHONE (OUTPATIENT)
Dept: INTERVENTIONAL RADIOLOGY/VASCULAR | Facility: CLINIC | Age: 59
End: 2024-12-11
Payer: MEDICAID

## 2024-12-13 ENCOUNTER — HOSPITAL ENCOUNTER (OUTPATIENT)
Dept: INTERVENTIONAL RADIOLOGY/VASCULAR | Facility: HOSPITAL | Age: 59
Discharge: HOME | DRG: 433 | End: 2024-12-13
Attending: INTERNAL MEDICINE | Admitting: INTERNAL MEDICINE
Payer: MEDICAID

## 2024-12-13 VITALS
SYSTOLIC BLOOD PRESSURE: 134 MMHG | OXYGEN SATURATION: 100 % | RESPIRATION RATE: 16 BRPM | HEART RATE: 56 BPM | DIASTOLIC BLOOD PRESSURE: 80 MMHG

## 2024-12-13 DIAGNOSIS — R18.8 CIRRHOSIS OF LIVER WITH ASCITES, UNSPECIFIED HEPATIC CIRRHOSIS TYPE: Primary | Chronic | ICD-10-CM

## 2024-12-13 DIAGNOSIS — K74.60 CIRRHOSIS OF LIVER WITH ASCITES, UNSPECIFIED HEPATIC CIRRHOSIS TYPE: Primary | Chronic | ICD-10-CM

## 2024-12-13 DIAGNOSIS — R18.8 CIRRHOSIS OF LIVER WITH ASCITES, UNSPECIFIED HEPATIC CIRRHOSIS TYPE: Chronic | ICD-10-CM

## 2024-12-13 DIAGNOSIS — K74.60 CIRRHOSIS OF LIVER WITH ASCITES, UNSPECIFIED HEPATIC CIRRHOSIS TYPE: Chronic | ICD-10-CM

## 2024-12-13 PROCEDURE — 49083 ABD PARACENTESIS W/IMAGING: CPT | Performed by: RADIOLOGY

## 2024-12-13 PROCEDURE — 63600175 PHARM REV CODE 636 W HCPCS: Performed by: FAMILY MEDICINE

## 2024-12-13 PROCEDURE — 0W9G3ZZ DRAINAGE OF PERITONEAL CAVITY, PERCUTANEOUS APPROACH: ICD-10-PCS | Performed by: RADIOLOGY

## 2024-12-13 PROCEDURE — C1729 CATH, DRAINAGE: HCPCS

## 2024-12-13 PROCEDURE — 49083 ABD PARACENTESIS W/IMAGING: CPT | Mod: ,,, | Performed by: FAMILY MEDICINE

## 2024-12-13 RX ORDER — LIDOCAINE HYDROCHLORIDE 10 MG/ML
INJECTION, SOLUTION INFILTRATION; PERINEURAL
Status: COMPLETED | OUTPATIENT
Start: 2024-12-13 | End: 2024-12-13

## 2024-12-13 RX ADMIN — LIDOCAINE HYDROCHLORIDE 10 ML: 10 INJECTION, SOLUTION INFILTRATION; PERINEURAL at 09:12

## 2024-12-13 NOTE — H&P
Radiology History & Physical      SUBJECTIVE:     Chief Complaint: abdominal distention    History of Present Illness:  Vicky Alvarado is a 59 y.o. female who presents for ultrasound guided paracentesis  Past Medical History:   Diagnosis Date    Anticoagulant long-term use     Arthritis     Atrial fibrillation     cardioversion    Atrial fibrillation with RVR     HAS WATCHMAN IN PLACE NOW    Avascular necrosis     L hand    Cellulitis of extremity 05/07/2022    CHF (congestive heart failure)     Chronic fatigue     Cirrhosis of liver with ascites     Depression     Diabetes mellitus     Encounter for blood transfusion 07/22/2020    Encounter for blood transfusion 03/2022    Fatty liver     GERD (gastroesophageal reflux disease)     Hx of psychiatric care     Hypertension     Iron deficiency anemia 05/07/2022    TIBC 444 with saturated iron 8    Left leg cellulitis 05/07/2022    Liver disease     Obese     Pre-diabetes 05/07/2022    Psychiatric problem     Sleep apnea     wears cpap    SOB (shortness of breath) on exertion     Weight loss     75lb intentional weight loss     Past Surgical History:   Procedure Laterality Date    ABLATION OF ARRHYTHMOGENIC FOCUS FOR ATRIAL FIBRILLATION N/A 07/20/2020    Procedure: Ablation atrial fibrillation;  Surgeon: Gabriel Hawley MD;  Location: [facility] EP LAB;  Service: Cardiology;  Laterality: N/A;  afib, PVI, RFA, REENA, SHADY, anes, MB, 3 Prep    ABLATION OF ARRHYTHMOGENIC FOCUS FOR ATRIAL FIBRILLATION N/A 01/24/2022    Procedure: Ablation atrial fibrillation;  Surgeon: Gabriel Hawley MD;  Location: [facility] EP LAB;  Service: Cardiology;  Laterality: N/A;  afib/afl, PVI (re-do)/CTI, RFA, REENA (cx if SR), SHADY, anes, MB, 3 Prep    APPLICATION OF WOUND VACUUM-ASSISTED CLOSURE DEVICE Left 08/03/2020    Procedure: APPLICATION, WOUND VAC;  Surgeon: SEMAJ Márquez III, MD;  Location: [facility] OR [address] [place] [address];  Service: Peripheral Vascular;  Laterality: Left;    APPLICATION OF WOUND  VACUUM-ASSISTED CLOSURE DEVICE Left 08/06/2020    Procedure: APPLICATION, WOUND VAC;  Surgeon: SEMAJ Márquez III, MD;  Location: [facility] OR 2ND FLR;  Service: Peripheral Vascular;  Laterality: Left;    CARPAL TUNNEL RELEASE Right 06/10/2020    Procedure: RELEASE, CARPAL TUNNEL - RIGHT;  Surgeon: Adelaida Hall MD;  Location: [facility] OR;  Service: Orthopedics;  Laterality: Right;  GENERAL AND REGIONAL    CARPAL TUNNEL RELEASE Left 05/05/2021    Procedure: RELEASE, CARPAL TUNNEL, LEFT;  Surgeon: Adelaida Hall MD;  Location: [facility] OR;  Service: Orthopedics;  Laterality: Left;  GENERAL & REGIONAL IN PACU    CARPECTOMY Left 09/06/2023    Procedure: CARPECTOMY;  Surgeon: Adelaida Hall MD;  Location: [facility] OR;  Service: Orthopedics;  Laterality: Left;  MAC/REGIONAL  LEFT PROXIMAL ROW    CLOSURE OF WOUND Left 08/06/2020    Procedure: CLOSURE, WOUND;  Surgeon: SEMAJ Márquez III, MD;  Location: [facility] OR 2ND FLR;  Service: Peripheral Vascular;  Laterality: Left;  Complex    COLONOSCOPY N/A 08/17/2021    Procedure: COLONOSCOPY Suprep;  Surgeon: Loco Deluca MD;  Location: [facility];  Service: Endoscopy;  Laterality: N/A;    ECHOCARDIOGRAM,TRANSESOPHAGEAL N/A 07/24/2023    Procedure: Transesophageal echo (REENA) intra-procedure log documentation;  Surgeon: Leyla Diagnostic Provider;  Location: [facility] EP LAB;  Service: Cardiology;  Laterality: N/A;  s/p WM, REENA, anes, 3 Prep    ESOPHAGOGASTRODUODENOSCOPY N/A 08/17/2021    Procedure: EGD (ESOPHAGOGASTRODUODENOSCOPY);  Surgeon: Loco Deluca MD;  Location: [facility] ENDO;  Service: Endoscopy;  Laterality: N/A;  Patient is schedule to have her Covid test on 08/14/2021 at the [facility] @ 9:30am. [place].    EXPLORATION OF FEMORAL ARTERY Left 07/21/2020    Procedure: EXPLORATION, ARTERY, FEMORAL;  Surgeon: SEMAJ Márquez III, MD;  Location: [facility] OR 2ND FLR;  Service: Peripheral Vascular;  Laterality: Left;  1. Urgent Direct repair, L SFA branch  laceration    FOOT SURGERY      HYSTEROSCOPY WITH DILATION AND CURETTAGE OF UTERUS N/A 02/19/2022    Procedure: HYSTEROSCOPY, WITH DILATION AND CURETTAGE OF UTERUS;  Surgeon: Shane Palomo MD;  Location: [address];  Service: OB/GYN;  Laterality: N/A;    INCISION AND DRAINAGE OF KNEE Left 05/12/2022    Procedure: INCISION AND DRAINAGE, Prepatellar bursectomy.;  Surgeon: Dre Guzmán MD;  [address];  Service: Orthopedics;  Laterality: Left;    LEFT HEART CATHETERIZATION Left 02/07/2023    Procedure: Left heart cath;  Surgeon: Josiah Terry MD;  Location: [facility] CATH LAB;  Service: Cardiology;  Laterality: Left;  borderline moderate bleeding risk 6.3%    OCCLUSION OF LEFT ATRIAL APPENDAGE N/A 06/12/2023    Procedure: Left atrial appendage occlusion;  Surgeon: Gabriel Hawley MD;  Location: [facility] EP LAB;  Service: Cardiology;  Laterality: N/A;  afib, watchman, BSCI, demi, ALIYA ibarra, 3prep    RELEASE OF ULNAR NERVE AT CUBITAL TUNNEL Bilateral     RIGHT HEART CATHETERIZATION Right 08/05/2024    Procedure: INSERTION, CATHETER, RIGHT HEART;  Surgeon: Zane Liu MD;  Location: [facility] CATH LAB;  Service: Cardiology;  Laterality: Right;    ROBOT-ASSISTED LAPAROSCOPIC ABDOMINAL HYSTERECTOMY USING DA KEIRY XI N/A 08/09/2022    Procedure: XI ROBOTIC HYSTERECTOMY;  Surgeon: Yolanda Barriga MD;  Location: [facility];  Service: OB/GYN;  Laterality: N/A;  dual console requested    ROBOT-ASSISTED LAPAROSCOPIC SALPINGO-OOPHORECTOMY Bilateral 08/09/2022    Procedure: ROBOTIC SALPINGO-OOPHORECTOMY;  Surgeon: Yolanda Barriga MD;  Location: [facility];  Service: OB/GYN;  Laterality: Bilateral;    TRANSESOPHAGEAL ECHOCARDIOGRAPHY N/A 06/12/2023    Procedure: ECHOCARDIOGRAM, TRANSESOPHAGEAL;  Surgeon: Cyril Mast MD;  Location: [facility] EP LAB;  Service: Cardiology;  Laterality: N/A;    TREATMENT OF CARDIAC ARRHYTHMIA N/A 01/29/2020    Procedure: CARDIOVERSION;  Surgeon: Gabriel Hawley MD;  Location:  [facility] EP LAB;  Service: Cardiology;  Laterality: N/A;  af, demi, dccv, anes, mb, 345    TREATMENT OF CARDIAC ARRHYTHMIA  01/24/2022    Procedure: Cardioversion or Defibrillation;  Surgeon: Gabriel Hawley MD;  Location: [facility] EP LAB;  Service: Cardiology;;    WOUND DEBRIDEMENT Left 08/06/2020    Procedure: DEBRIDEMENT, WOUND;  Surgeon: SEMAJ Márquez III, MD;  Location: [facility] OR [address] [place] [address];  Service: Peripheral Vascular;  Laterality: Left;       Home Meds:   Prior to Admission medications    Medication Sig Start Date End Date Taking? Authorizing Provider   albuterol (VENTOLIN HFA) 90 mcg/actuation inhaler Inhale 2 puffs into the lungs every 6 (six) hours as needed for Wheezing. Rescue 12/14/23 12/31/24  Gordy Cordero MD   ALPRAZolam (XANAX) 0.5 MG tablet Take 1 tablet (0.5 mg total) by mouth 2 (two) times daily as needed for Anxiety. 9/4/24   Gordy Cordero MD   b complex vitamins capsule Take 1 capsule by mouth every other day.    Provider, Historical   DULoxetine (CYMBALTA) 60 MG capsule Take 1 capsule (60 mg total) by mouth once daily. 8/12/24   Brennon Nunez MD   ferrous sulfate 325 (65 FE) MG EC tablet Take 1 tablet (325 mg total) by mouth every other day. 9/18/24   Eugene Woodward MD   furosemide (LASIX) 20 MG tablet Take 1 tablet (20 mg total) by mouth once daily. 9/19/24 9/19/25  Eugene Woodward MD   lactulose (CHRONULAC) 10 gram/15 mL solution Take 15 mLs (10 g total) by mouth 2 (two) times daily. Please ensure you are having at least 2-3 bowel movements every day. 11/12/24   Kiko Saba MD   metoprolol succinate (TOPROL-XL) 25 MG 24 hr tablet Take 3 tablets (75 mg total) by mouth 2 (two) times daily. 9/18/24 12/20/24  Eugene Woodward MD   midodrine (PROAMATINE) 5 MG Tab Take 3 tablets (15 mg total) by mouth 3 (three) times daily. 9/18/24 12/17/24  Eugene Woodward MD   multivitamin (THERAGRAN) per tablet Take 1 tablet by mouth once daily.    Provider, Historical   nystatin (MYCOSTATIN) powder Apply topically  2 (two) times daily. 11/7/24   Gordy Cordero MD   omeprazole (PRILOSEC) 20 MG capsule TAKE 1 CAPSULE BY MOUTH ONCE DAILY 7/25/24   Gordy Cordero MD   ondansetron (ZOFRAN-ODT) 4 MG TbDL Take 2 tablets (8 mg total) by mouth every 8 (eight) hours as needed (nasuea, vomiting). 10/28/24   Gordy Cordero MD   oxyCODONE (ROXICODONE) 5 MG immediate release tablet Take 1 tablet (5 mg total) by mouth every 12 (twelve) hours as needed for Pain. 12/5/24   Gordy Cordero MD   spironolactone (ALDACTONE) 50 MG tablet Take 1 tablet (50 mg total) by mouth once daily. 11/12/24 6/10/25  Kiko Saba MD   medroxyPROGESTERone (PROVERA) 10 MG tablet Take 2 tablets (20 mg total) by mouth 3 (three) times daily. 7/13/22 8/9/22  Yolanda Barriga MD     Anticoagulants/Antiplatelets: no anticoagulation    Allergies:   Review of patient's allergies indicates:   Allergen Reactions    Flecainide Shortness Of Breath and Swelling    Shellfish containing products Other (See Comments)     Makes gout terrible    Vancomycin analogues Hives, Itching and Rash     Sedation History:  no adverse reactions    Review of Systems:   Hematological: no known coagulopathies  Respiratory: no shortness of breath  Cardiovascular: no chest pain  Gastrointestinal: no abdominal pain  Genito-Urinary: no dysuria  Musculoskeletal: negative  Neurological: no TIA or stroke symptoms         OBJECTIVE:     Vital Signs (Most Recent)       Physical Exam:  ASA: 2  Mallampati: n/a    General: no acute distress  Mental Status: alert and oriented to person, place and time  HEENT: normocephalic, atraumatic  Chest: unlabored breathing  Heart: regular heart rate  Abdomen: distended  Extremity: moves all extremities    ASSESSMENT/PLAN:     Sedation Plan: local  Patient will undergo ultrasound guided paracentesis.    SCOUT Wolfe, FNP  Interventional Radiology  [phone] [facility]

## 2024-12-13 NOTE — DISCHARGE INSTRUCTIONS
Please call with any questions or concerns.      Monday through Friday 8:00 am - 5:00 pm    Interventional Radiology Clinic  [phone]    After Hours    Ask the  for the Radiology Resident on call  [phone]        You may remove the dressing over your procedure site 24 hours after your procedure. You may shower 24 hours after your procedure. Keep the procedure site clean, dry, and open to air. Do not submerge in water (bath, pool, hot tub, ect.) until the site is completely healed.

## 2024-12-13 NOTE — PROCEDURES
Radiology Post-Procedure Note    Pre Op Diagnosis: Ascites  Post Op Diagnosis: Same    Procedure: Ultrasound Guided Paracentesis    Procedure performed by: Jannet CUNNINGHAM, Samantha     Written Informed Consent Obtained: Yes  Specimen Removed: YES serous  Estimated Blood Loss: Minimal    Findings:   Successful LLQ paracentesis.  Albumin administered PRN per protocol.    Patient tolerated procedure well.    Samantha Power, APRN, FNP  Interventional Radiology  [phone] clinic

## 2024-12-13 NOTE — PLAN OF CARE
Paracentesis done. Removed 4050 ml. Albumin not indicated. Patient AAOx3, no distress noted, respirations even and unlabored. Pt informed she will need lab work before her next para. AVS reviewed. Pt discharged.  Vitals:    12/13/24 1022   BP: 134/80   Pulse: (!) 56   Resp: 16

## 2024-12-13 NOTE — PLAN OF CARE
Pt arrived to MPU 3 for paracentesis.  Name verified using two identifiers.  Allergies verified. VSS.

## 2024-12-16 ENCOUNTER — PATIENT MESSAGE (OUTPATIENT)
Dept: HEPATOLOGY | Facility: CLINIC | Age: 59
End: 2024-12-16
Payer: MEDICAID

## 2024-12-16 ENCOUNTER — CLINICAL SUPPORT (OUTPATIENT)
Dept: REHABILITATION | Facility: HOSPITAL | Age: 59
End: 2024-12-16
Payer: MEDICAID

## 2024-12-16 DIAGNOSIS — R26.89 DECREASED FUNCTIONAL MOBILITY: ICD-10-CM

## 2024-12-16 DIAGNOSIS — R29.898 DECREASED STRENGTH OF UPPER EXTREMITY: ICD-10-CM

## 2024-12-16 DIAGNOSIS — R29.898 DECREASED STRENGTH OF LOWER EXTREMITY: Primary | ICD-10-CM

## 2024-12-16 DIAGNOSIS — R68.89 DECREASED FUNCTIONAL ACTIVITY TOLERANCE: ICD-10-CM

## 2024-12-16 PROCEDURE — 97110 THERAPEUTIC EXERCISES: CPT

## 2024-12-16 NOTE — PROGRESS NOTES
[facility]   Physical Therapy Treatment Note     Name: Vicky Alvarado  Clinic Number: [id number]    Therapy Diagnosis:   Encounter Diagnoses   Name Primary?    Decreased strength of lower extremity Yes    Decreased strength of upper extremity     Decreased functional mobility     Decreased functional activity tolerance        Physician: Sarah Harding PA-C    Visit Date: 2024    Physician Orders: PT Eval and Treat   Medical Diagnosis from Referral: R53.81 (ICD-10-CM) - Debility   Evaluation Date: 10/7/2024  Authorization Period Expiration: 10/7/2025  Plan of Care Expiration: 2024  Progress Note Due: 10th visit  Visit # / Visits authorized: 10 / 20 + eval  FOTO: 2/3    PTA Visit #: 0 / 5     Time In: 10:55 pm  Time Out: 12:00 pm  Total Treatment Time: 65 minutes  Total Billable Time: 65 minutes (4  - Medicaid)    Precautions: Standard, Diabetes, Fall, and live cirrhosis A fib      SUBJECTIVE     Patient reports: her nephew noticed improvements in her functional mobility. Reports she had an episode of gout in her right foot and it was treated with medication  She was compliant with home exercise program.  Response to previous treatment: appropriate muscle response  Functional change: ongoing; ambulating with rollator    Pain: 2/10, currently  Location: bilateral knees    Patient goals: improve fitness    OBJECTIVE     Objective Measures updated at progress report unless specified.     2024:  TU seconds  30 STS: 6x  FOTO: see media section    Treatment     *Per Medicaid guidelines all therapy billed as therex*  *PT one-on-one with patient for entire session with PT extender utilized for remainder of therapy session*     Vicky received the treatments listed below:      therapeutic exercises to develop strength, endurance, ROM, flexibility, posture, and core stabilization for 64 minutes including:  Ambulation to and from waiting room with rollator and  therapist Supervision  Nustep for 15 minutes, Level 1 for endurance (~2,004 steps)   Sit to stands from std chair + airex; 3 x 10 reps - cues for nose over toes + 4 lbs in each hand  Lateral walks at half wall (12 feet); 3 laps + 3 lb AW  Long arc quads at EOM + 3 lb AW; 10 second hold, 3 x 10 reps Bilateral  Seated heel raises; 3 x 10 reps + 3 lb AW  Seated march + 3 lb AW; 3 minutes, alternating  Step ups 4 inch B UE support 3x10 each    Not performed:  NBOS in // bars; 1 minute, 3 trials   Step ups in // bars onto 2-inch step; 2 x 8 reps Bilateral    Patient Education and Home Exercises     Home Exercises Provided and Patient Education Provided     Education provided:   - compliance with HEP  - plan of care  - prognosis  - importance of exercise and functional mobility    Written Home Exercises Provided: Patient instructed to cont prior HEP. Exercises were reviewed and Vicky was able to demonstrate them prior to the end of the session.  Vicky demonstrated good  understanding of the education provided. See EMR under Patient Instructions for exercises provided during therapy sessions    ASSESSMENT     Progressing very well overall and continues to demonstrate improving LE endurance. [place] rest breaks required. Patient demonstrates improving capacity for exercise.       Vicky Is progressing well towards her goals.   Pt prognosis is Fair.     Pt will continue to benefit from skilled outpatient physical therapy to address the deficits listed in the problem list box on initial evaluation, provide pt/family education and to maximize pt's level of independence in the home and community environment.     Pt's spiritual, cultural and educational needs considered and pt agreeable to plan of care and goals.     Anticipated barriers to physical therapy: chronicity, age, co-morbidities    Goals:   Short Term Goals (6 Weeks): - Appropriate, ongoing  1. Patient will be independent with home exercise program to supplement  physical therapy treatment in improving functional status.  2. Pt will improve impaired lower extremity manual muscle tests to >/= 4-/5 to improve dynamic lower extremity support for closed chain tasks.  3. Patient will perform 3 consecutive hip hinges with less than 3 VC to demonstrate improved lumbopelvic movement for safety during household ADL's and improved lifting techniques.   4. Patient will report decreased pain with activity 3/10 to demonstrate improved tolerance for activity and household ADL's.     Long Term Goals (10 Weeks): - Appropriate, ongoing  1. Pt will improve impaired lower extremity manual muscle tests to >/= 4/5 to improve dynamic lower extremity support for closed chain tasks.  2. Patient will improve the total FOTO MSK Survey Score to </= 30% limited to demonstrate increased perceived functional mobility.  3. Patient will perform at least 10 sit to stands in 30 seconds without UE support to demonstrate increased functional strength.   4. Patient will perform 3 consecutive hip hinges with less than 0VC to demonstrate improved lumbopelvic movement for safety during household ADL's and improved lifting techniques.   5. Patient will initiate walking program to demonstrate improved activity tolerance.  6. Patient will perform timed up and go with least restrictive assistive device in < 10 seconds to improve gait speed and safety with community ambulation.  7. Patient will perform at least 8-10 sit to stands in 30 seconds without UE support to demonstrate increased functional strength.     PLAN     Plan of Care Certification: 10/7/2024 to 12/20/2024.     Outpatient Physical Therapy 2 times weekly for 10 weeks to include the following interventions: Aquatic Therapy, Cervical/Lumbar Traction, Electrical Stimulation TENS, Gait Training, Manual Therapy, Moist Heat/ Ice, Neuromuscular Re-ed, Orthotic Management and Training, Patient Education, Self Care, Therapeutic Activities, Therapeutic Exercise,  and functional dry needling    PT/PTA met face to face to discuss patient's treatment plan and progress towards established goals. Patient will be seen by physical therapist every sixth visit and minimally once per month.    Noemí Yuan, PT, DPT, OCS

## 2024-12-17 NOTE — TELEPHONE ENCOUNTER
Contacted the pt informed that I scheduled her lab on 12/18/2024 at 7;30 before her para.Patient voice understanding.

## 2024-12-18 ENCOUNTER — HOSPITAL ENCOUNTER (OUTPATIENT)
Dept: INTERVENTIONAL RADIOLOGY/VASCULAR | Facility: HOSPITAL | Age: 59
Discharge: HOME | End: 2024-12-18
Attending: INTERNAL MEDICINE
Payer: MEDICAID

## 2024-12-18 VITALS
RESPIRATION RATE: 15 BRPM | DIASTOLIC BLOOD PRESSURE: 61 MMHG | SYSTOLIC BLOOD PRESSURE: 112 MMHG | OXYGEN SATURATION: 98 % | HEART RATE: 53 BPM

## 2024-12-18 DIAGNOSIS — K74.60 CIRRHOSIS OF LIVER WITH ASCITES, UNSPECIFIED HEPATIC CIRRHOSIS TYPE: Chronic | ICD-10-CM

## 2024-12-18 DIAGNOSIS — R18.8 CIRRHOSIS OF LIVER WITH ASCITES, UNSPECIFIED HEPATIC CIRRHOSIS TYPE: Chronic | ICD-10-CM

## 2024-12-18 PROCEDURE — C1729 CATH, DRAINAGE: HCPCS

## 2024-12-18 PROCEDURE — 49083 ABD PARACENTESIS W/IMAGING: CPT | Performed by: STUDENT IN AN ORGANIZED HEALTH CARE EDUCATION/TRAINING PROGRAM

## 2024-12-18 PROCEDURE — 49083 ABD PARACENTESIS W/IMAGING: CPT | Mod: ,,, | Performed by: PHYSICIAN ASSISTANT

## 2024-12-18 PROCEDURE — 63600175 PHARM REV CODE 636 W HCPCS: Performed by: PHYSICIAN ASSISTANT

## 2024-12-18 RX ORDER — LIDOCAINE HYDROCHLORIDE 10 MG/ML
INJECTION, SOLUTION INFILTRATION; PERINEURAL
Status: COMPLETED | OUTPATIENT
Start: 2024-12-18 | End: 2024-12-18

## 2024-12-18 RX ADMIN — LIDOCAINE HYDROCHLORIDE 10 ML: 10 INJECTION, SOLUTION INFILTRATION; PERINEURAL at 08:12

## 2024-12-18 NOTE — PROCEDURES
Radiology Post-Procedure Note    Pre Op Diagnosis: Ascites  Post Op Diagnosis: Same    Procedure: Ultrasound Guided Paracentesis    Procedure performed by: Bushra Epps PA-C    Written Informed Consent Obtained: Yes  Specimen Removed: YES, clear yellow fluid  Estimated Blood Loss: Minimal    Findings:   Successful paracentesis. Access obtained in the LLQ. Albumin administered PRN per protocol.    Patient tolerated procedure well.    Bushra Epps PA-C  Interventional Radiology   Spectra: [phone]

## 2024-12-18 NOTE — PLAN OF CARE
Paracentesis completed, pt tolerated well. No s/s of complications noted. 2950 ml removed from LLQ, mepore applied CDI. No albumin given per protocol.  Discharge instructions reviewed and acknowledged by patient. Pt discharged via walker.

## 2024-12-18 NOTE — Clinical Note
Left: Abdomen.   Scrubbed with ChloroPrep With Tint.    Hair: N/A.  Skin prep dry before draping.  Prepped by: Bushra Epps PA-C 12/18/2024 8:40 AM.

## 2024-12-18 NOTE — PLAN OF CARE
Pt arrived to mpu room 1 for paracentesis, no acute distress noted. Orders and labs reviewed on chart. Awaiting consent.

## 2024-12-18 NOTE — DISCHARGE INSTRUCTIONS
Questions or Concerns   - [phone] ([facility]-F[facility] 8 AM- 5PM).   - Radiology Resident on call [phone] (Weekends and After hours).  - IR Clinic [phone]

## 2024-12-27 ENCOUNTER — HOSPITAL ENCOUNTER (OUTPATIENT)
Dept: INTERVENTIONAL RADIOLOGY/VASCULAR | Facility: HOSPITAL | Age: 59
Discharge: HOME | End: 2024-12-27
Attending: INTERNAL MEDICINE
Payer: MEDICAID

## 2024-12-27 VITALS
RESPIRATION RATE: 16 BRPM | HEART RATE: 54 BPM | OXYGEN SATURATION: 99 % | SYSTOLIC BLOOD PRESSURE: 132 MMHG | DIASTOLIC BLOOD PRESSURE: 63 MMHG

## 2024-12-27 DIAGNOSIS — K74.60 CIRRHOSIS OF LIVER WITH ASCITES, UNSPECIFIED HEPATIC CIRRHOSIS TYPE: Chronic | ICD-10-CM

## 2024-12-27 DIAGNOSIS — R18.8 CIRRHOSIS OF LIVER WITH ASCITES, UNSPECIFIED HEPATIC CIRRHOSIS TYPE: Chronic | ICD-10-CM

## 2024-12-27 PROCEDURE — 49083 ABD PARACENTESIS W/IMAGING: CPT | Performed by: RADIOLOGY

## 2024-12-27 PROCEDURE — C1729 CATH, DRAINAGE: HCPCS

## 2024-12-27 NOTE — PROCEDURES
Radiology Post-Procedure Note    Pre Op Diagnosis: Ascites  Post Op Diagnosis: Same    Procedure: Ultrasound Guided Paracentesis    Procedure performed by: Magno Elmore MD    Written Informed Consent Obtained: Yes  Specimen Removed: No  Estimated Blood Loss: Minimal    Findings:   Successful paracentesis.  Albumin administered PRN per protocol.    Patient tolerated procedure well.    Magno Elmore MD PGY 2  Interventional radiology  '

## 2024-12-27 NOTE — H&P
Radiology History & Physical      SUBJECTIVE:     Chief Complaint: ab pain    History of Present Illness:  Vicky Alvarado is a 59 y.o. female who presents for us guided paracentesis.     Past Medical History:   Diagnosis Date    Anticoagulant long-term use     Arthritis     Atrial fibrillation     cardioversion    Atrial fibrillation with RVR     HAS WATCHMAN IN PLACE NOW    Avascular necrosis     L hand    Cellulitis of extremity 05/07/2022    CHF (congestive heart failure)     Chronic fatigue     Cirrhosis of liver with ascites     Depression     Diabetes mellitus     Encounter for blood transfusion 07/22/2020    Encounter for blood transfusion 03/2022    Fatty liver     GERD (gastroesophageal reflux disease)     Hx of psychiatric care     Hypertension     Iron deficiency anemia 05/07/2022    TIBC 444 with saturated iron 8    Left leg cellulitis 05/07/2022    Liver disease     Obese     Pre-diabetes 05/07/2022    Psychiatric problem     Sleep apnea     wears cpap    SOB (shortness of breath) on exertion     Weight loss     75lb intentional weight loss     Past Surgical History:   Procedure Laterality Date    ABLATION OF ARRHYTHMOGENIC FOCUS FOR ATRIAL FIBRILLATION N/A 07/20/2020    Procedure: Ablation atrial fibrillation;  Surgeon: Gabriel Hawley MD;  Location: [facility] EP LAB;  Service: Cardiology;  Laterality: N/A;  afib, PVI, RFA, REENA, SHADY, anes, MB, 3 Prep    ABLATION OF ARRHYTHMOGENIC FOCUS FOR ATRIAL FIBRILLATION N/A 01/24/2022    Procedure: Ablation atrial fibrillation;  Surgeon: Gabriel Hawley MD;  Location: [facility] EP LAB;  Service: Cardiology;  Laterality: N/A;  afib/afl, PVI (re-do)/CTI, RFA, REENA (cx if SR), SHADY, anes, MB, 3 Prep    APPLICATION OF WOUND VACUUM-ASSISTED CLOSURE DEVICE Left 08/03/2020    Procedure: APPLICATION, WOUND VAC;  Surgeon: SEMAJ Márquez III, MD;  Location: [facility] OR [address] [place] [address];  Service: Peripheral Vascular;  Laterality: Left;    APPLICATION OF WOUND VACUUM-ASSISTED  CLOSURE DEVICE Left 08/06/2020    Procedure: APPLICATION, WOUND VAC;  Surgeon: SEMAJ Márquez III, MD;  Location: [facility] OR 2ND FLR;  Service: Peripheral Vascular;  Laterality: Left;    CARPAL TUNNEL RELEASE Right 06/10/2020    Procedure: RELEASE, CARPAL TUNNEL - RIGHT;  Surgeon: Adelaida Hall MD;  Location: [facility] OR;  Service: Orthopedics;  Laterality: Right;  GENERAL AND REGIONAL    CARPAL TUNNEL RELEASE Left 05/05/2021    Procedure: RELEASE, CARPAL TUNNEL, LEFT;  Surgeon: Adelaida Hall MD;  Location: [facility] OR;  Service: Orthopedics;  Laterality: Left;  GENERAL & REGIONAL IN PACU    CARPECTOMY Left 09/06/2023    Procedure: CARPECTOMY;  Surgeon: Adelaida Hall MD;  Location: [facility] OR;  Service: Orthopedics;  Laterality: Left;  MAC/REGIONAL  LEFT PROXIMAL ROW    CLOSURE OF WOUND Left 08/06/2020    Procedure: CLOSURE, WOUND;  Surgeon: SEMAJ Márquez III, MD;  Location: [facility] OR 2ND FLR;  Service: Peripheral Vascular;  Laterality: Left;  Complex    COLONOSCOPY N/A 08/17/2021    Procedure: COLONOSCOPY Suprep;  Surgeon: Loco Deluca MD;  Location: [facility];  Service: Endoscopy;  Laterality: N/A;    ECHOCARDIOGRAM,TRANSESOPHAGEAL N/A 07/24/2023    Procedure: Transesophageal echo (REENA) intra-procedure log documentation;  Surgeon: Leyla Diagnostic Provider;  Location: [facility] EP LAB;  Service: Cardiology;  Laterality: N/A;  s/p WM, REENA, anes, 3 Prep    ESOPHAGOGASTRODUODENOSCOPY N/A 08/17/2021    Procedure: EGD (ESOPHAGOGASTRODUODENOSCOPY);  Surgeon: Loco Deluca MD;  Location: [facility] ENDO;  Service: Endoscopy;  Laterality: N/A;  Patient is schedule to have her Covid test on 08/14/2021 at the [facility] @ 9:30am. [place].    EXPLORATION OF FEMORAL ARTERY Left 07/21/2020    Procedure: EXPLORATION, ARTERY, FEMORAL;  Surgeon: SEMAJ Márquez III, MD;  Location: [facility] OR 2ND FLR;  Service: Peripheral Vascular;  Laterality: Left;  1. Urgent Direct repair, L SFA branch laceration    FOOT  SURGERY      HYSTEROSCOPY WITH DILATION AND CURETTAGE OF UTERUS N/A 02/19/2022    Procedure: HYSTEROSCOPY, WITH DILATION AND CURETTAGE OF UTERUS;  Surgeon: Shane Palomo MD;  [address]  Service: OB/GYN;  Laterality: N/A;    INCISION AND DRAINAGE OF KNEE Left 05/12/2022    Procedure: INCISION AND DRAINAGE, Prepatellar bursectomy.;  Surgeon: Dre Guzmán MD;  [address]  Service: Orthopedics;  Laterality: Left;    LEFT HEART CATHETERIZATION Left 02/07/2023    Procedure: Left heart cath;  Surgeon: Josiah Terry MD;  Location: [facility] CATH LAB;  Service: Cardiology;  Laterality: Left;  borderline moderate bleeding risk 6.3%    OCCLUSION OF LEFT ATRIAL APPENDAGE N/A 06/12/2023    Procedure: Left atrial appendage occlusion;  Surgeon: Gabriel Hawley MD;  Location: [facility] EP LAB;  Service: Cardiology;  Laterality: N/A;  afib, watchman, BSCI, demi, ALIYA ibarra, 3prep    RELEASE OF ULNAR NERVE AT CUBITAL TUNNEL Bilateral     RIGHT HEART CATHETERIZATION Right 08/05/2024    Procedure: INSERTION, CATHETER, RIGHT HEART;  Surgeon: Zane Liu MD;  Location: [facility] CATH LAB;  Service: Cardiology;  Laterality: Right;    ROBOT-ASSISTED LAPAROSCOPIC ABDOMINAL HYSTERECTOMY USING DA KEIRY XI N/A 08/09/2022    Procedure: XI ROBOTIC HYSTERECTOMY;  Surgeon: Yolanda Barriga MD;  Location: [facility];  Service: OB/GYN;  Laterality: N/A;  dual console requested    ROBOT-ASSISTED LAPAROSCOPIC SALPINGO-OOPHORECTOMY Bilateral 08/09/2022    Procedure: ROBOTIC SALPINGO-OOPHORECTOMY;  Surgeon: Yolanda Barriga MD;  Location: [facility];  Service: OB/GYN;  Laterality: Bilateral;    TRANSESOPHAGEAL ECHOCARDIOGRAPHY N/A 06/12/2023    Procedure: ECHOCARDIOGRAM, TRANSESOPHAGEAL;  Surgeon: Cyril Mast MD;  Location: [facility] EP LAB;  Service: Cardiology;  Laterality: N/A;    TREATMENT OF CARDIAC ARRHYTHMIA N/A 01/29/2020    Procedure: CARDIOVERSION;  Surgeon: Gabriel Hawley MD;  Location: [facility] EP LAB;   Service: Cardiology;  Laterality: N/A;  af, demi, dccv, anes, mb, 345    TREATMENT OF CARDIAC ARRHYTHMIA  01/24/2022    Procedure: Cardioversion or Defibrillation;  Surgeon: Gabriel Hawley MD;  Location: [facility] EP LAB;  Service: Cardiology;;    WOUND DEBRIDEMENT Left 08/06/2020    Procedure: DEBRIDEMENT, WOUND;  Surgeon: SEMAJ Márquez III, MD;  Location: [facility] OR [address] [place] [address];  Service: Peripheral Vascular;  Laterality: Left;       Home Meds:   Prior to Admission medications    Medication Sig Start Date End Date Taking? Authorizing Provider   albuterol (VENTOLIN HFA) 90 mcg/actuation inhaler Inhale 2 puffs into the lungs every 6 (six) hours as needed for Wheezing. Rescue 12/14/23 12/31/24  Gordy Cordero MD   ALPRAZolam (XANAX) 0.5 MG tablet Take 1 tablet (0.5 mg total) by mouth 2 (two) times daily as needed for Anxiety. 9/4/24   Gordy Cordero MD   b complex vitamins capsule Take 1 capsule by mouth every other day.    Provider, Historical   DULoxetine (CYMBALTA) 60 MG capsule Take 1 capsule (60 mg total) by mouth once daily. 8/12/24   Brennon Nunez MD   ferrous sulfate 325 (65 FE) MG EC tablet Take 1 tablet (325 mg total) by mouth every other day. 9/18/24   Eugene Woodward MD   furosemide (LASIX) 20 MG tablet Take 1 tablet (20 mg total) by mouth once daily. 9/19/24 9/19/25  Eugene Woodward MD   lactulose (CHRONULAC) 10 gram/15 mL solution Take 15 mLs (10 g total) by mouth 2 (two) times daily. Please ensure you are having at least 2-3 bowel movements every day. 11/12/24   Kiko Saba MD   metoprolol succinate (TOPROL-XL) 25 MG 24 hr tablet Take 3 tablets (75 mg total) by mouth 2 (two) times daily. 9/18/24 12/20/24  Eugene Woodward MD   midodrine (PROAMATINE) 5 MG Tab Take 3 tablets (15 mg total) by mouth 3 (three) times daily. 9/18/24 12/17/24  Eugene Woodward MD   multivitamin (THERAGRAN) per tablet Take 1 tablet by mouth once daily.    Provider, Historical   nystatin (MYCOSTATIN) powder Apply topically 2 (two) times  daily. 11/7/24   Gordy Cordero MD   omeprazole (PRILOSEC) 20 MG capsule TAKE 1 CAPSULE BY MOUTH ONCE DAILY 7/25/24   Gordy Cordero MD   ondansetron (ZOFRAN-ODT) 4 MG TbDL Take 2 tablets (8 mg total) by mouth every 8 (eight) hours as needed (nasuea, vomiting). 10/28/24   Gordy Cordero MD   oxyCODONE (ROXICODONE) 5 MG immediate release tablet Take 1 tablet (5 mg total) by mouth every 12 (twelve) hours as needed for Pain. 12/5/24   Gordy Cordero MD   spironolactone (ALDACTONE) 50 MG tablet Take 1 tablet (50 mg total) by mouth once daily. 11/12/24 6/10/25  Kiko Saba MD   medroxyPROGESTERone (PROVERA) 10 MG tablet Take 2 tablets (20 mg total) by mouth 3 (three) times daily. 7/13/22 8/9/22  Yolanda Barriga MD     Anticoagulants/Antiplatelets: no anticoagulation    Allergies:   Review of patient's allergies indicates:   Allergen Reactions    Flecainide Shortness Of Breath and Swelling    Shellfish containing products Other (See Comments)     Makes gout terrible    Vancomycin analogues Hives, Itching and Rash     Sedation History:  no adverse reactions    Review of Systems:   Hematological: no known coagulopathies  Respiratory: no shortness of breath  Cardiovascular: no chest pain  Gastrointestinal: no abdominal pain  Genito-Urinary: no dysuria  Musculoskeletal: negative  Neurological: no TIA or stroke symptoms         OBJECTIVE:     Vital Signs (Most Recent)  Pulse: (!) 54 (12/27/24 1121)  Resp: 16 (12/27/24 1121)  BP: 132/63 (12/27/24 1121)  SpO2: 99 % (12/27/24 1121)    Physical Exam:  ASA: 2    Mallampati: 2    General: no acute distress  Mental Status: alert and oriented to person, place and time  HEENT: normocephalic, atraumatic  Chest: unlabored breathing  Heart: regular heart rate  Abdomen: nondistended  Extremity: moves all extremities    Laboratory  Lab Results   Component Value Date    INR 0.9 12/18/2024       Lab Results   Component Value Date    WBC 6.00 12/18/2024    HGB 12.0  12/18/2024    HCT 37.2 12/18/2024    MCV 87 12/18/2024     12/18/2024      Lab Results   Component Value Date    GLU 95 12/18/2024    GLU 95 12/18/2024     12/18/2024     12/18/2024    K 4.9 12/18/2024    K 4.9 12/18/2024     12/18/2024     12/18/2024    CO2 26 12/18/2024    CO2 26 12/18/2024    BUN 25 (H) 12/18/2024    BUN 25 (H) 12/18/2024    CREATININE 1.2 12/18/2024    CREATININE 1.2 12/18/2024    CALCIUM 9.5 12/18/2024    CALCIUM 9.5 12/18/2024    MG 1.7 09/18/2024    ALT 8 (L) 12/18/2024    AST 29 12/18/2024    ALBUMIN 3.0 (L) 12/18/2024    BILITOT 0.3 12/18/2024    BILIDIR 0.8 (H) 08/02/2024       ASSESSMENT/PLAN:     Sedation Plan: local only  Patient will undergo us guided paracentesis.    Magno Elmore MD  Radiology PGY-2

## 2024-12-27 NOTE — PLAN OF CARE
Procedure completed. Patient drained 4600 mL.  Patient tolerated well; VSS. LLQ Site CDI. Patient stable for discharge.  Patient walked to the lobby for discharge home.

## 2025-01-02 ENCOUNTER — HOSPITAL ENCOUNTER (OUTPATIENT)
Dept: INTERVENTIONAL RADIOLOGY/VASCULAR | Facility: HOSPITAL | Age: 60
Discharge: HOME | End: 2025-01-02
Attending: INTERNAL MEDICINE
Payer: MEDICAID

## 2025-01-02 VITALS
OXYGEN SATURATION: 99 % | DIASTOLIC BLOOD PRESSURE: 72 MMHG | TEMPERATURE: 98 F | RESPIRATION RATE: 18 BRPM | HEART RATE: 64 BPM | SYSTOLIC BLOOD PRESSURE: 138 MMHG

## 2025-01-02 DIAGNOSIS — K74.60 CIRRHOSIS OF LIVER WITH ASCITES, UNSPECIFIED HEPATIC CIRRHOSIS TYPE: Chronic | ICD-10-CM

## 2025-01-02 DIAGNOSIS — R18.8 CIRRHOSIS OF LIVER WITH ASCITES, UNSPECIFIED HEPATIC CIRRHOSIS TYPE: Chronic | ICD-10-CM

## 2025-01-02 PROCEDURE — 49083 ABD PARACENTESIS W/IMAGING: CPT | Performed by: STUDENT IN AN ORGANIZED HEALTH CARE EDUCATION/TRAINING PROGRAM

## 2025-01-02 PROCEDURE — 49083 ABD PARACENTESIS W/IMAGING: CPT | Mod: ,,,

## 2025-01-02 NOTE — PROGRESS NOTES
Pt reports feeling fine and refusing albumin IV today / B/P to chart and no acute change noted / pt AAO w/ NAD

## 2025-01-02 NOTE — PROCEDURES
Radiology Post-Procedure Note    Pre Op Diagnosis: Ascites  Post Op Diagnosis: Same    Procedure: Ultrasound Guided Paracentesis    Procedure performed by: Shannen Callahan PA-C    Written Informed Consent Obtained: Yes  Specimen Removed: YES (yellow, clear)  Estimated Blood Loss: Minimal    Findings:   Successful paracentesis from Left.  Albumin administered PRN per protocol.    Patient tolerated procedure well.    Shannen Callahan PA-C  Interventional Radiology  [phone]

## 2025-01-02 NOTE — H&P
Radiology History & Physical      SUBJECTIVE:     Chief Complaint: Abdominal Distension    History of Present Illness:  Vicky Alvarado is a 59 y.o. female who presents for US guided paracentesis.     Past Medical History:   Diagnosis Date    Anticoagulant long-term use     Arthritis     Atrial fibrillation     cardioversion    Atrial fibrillation with RVR     HAS WATCHMAN IN PLACE NOW    Avascular necrosis     L hand    Cellulitis of extremity 05/07/2022    CHF (congestive heart failure)     Chronic fatigue     Cirrhosis of liver with ascites     Depression     Diabetes mellitus     Encounter for blood transfusion 07/22/2020    Encounter for blood transfusion 03/2022    Fatty liver     GERD (gastroesophageal reflux disease)     Hx of psychiatric care     Hypertension     Iron deficiency anemia 05/07/2022    TIBC 444 with saturated iron 8    Left leg cellulitis 05/07/2022    Liver disease     Obese     Pre-diabetes 05/07/2022    Psychiatric problem     Sleep apnea     wears cpap    SOB (shortness of breath) on exertion     Weight loss     75lb intentional weight loss     Past Surgical History:   Procedure Laterality Date    ABLATION OF ARRHYTHMOGENIC FOCUS FOR ATRIAL FIBRILLATION N/A 07/20/2020    Procedure: Ablation atrial fibrillation;  Surgeon: Gabriel Hawley MD;  Location: [facility] EP LAB;  Service: Cardiology;  Laterality: N/A;  afib, PVI, RFA, REENA, SHADY, anes, MB, 3 Prep    ABLATION OF ARRHYTHMOGENIC FOCUS FOR ATRIAL FIBRILLATION N/A 01/24/2022    Procedure: Ablation atrial fibrillation;  Surgeon: Gabriel Hawley MD;  Location: [facility] EP LAB;  Service: Cardiology;  Laterality: N/A;  afib/afl, PVI (re-do)/CTI, RFA, REENA (cx if SR), SHADY, anes, MB, 3 Prep    APPLICATION OF WOUND VACUUM-ASSISTED CLOSURE DEVICE Left 08/03/2020    Procedure: APPLICATION, WOUND VAC;  Surgeon: SEMAJ Márquez III, MD;  Location: [facility] OR [address] [place] [address];  Service: Peripheral Vascular;  Laterality: Left;    APPLICATION OF WOUND  VACUUM-ASSISTED CLOSURE DEVICE Left 08/06/2020    Procedure: APPLICATION, WOUND VAC;  Surgeon: SEMAJ Márquez III, MD;  Location: [facility] OR 2ND FLR;  Service: Peripheral Vascular;  Laterality: Left;    CARPAL TUNNEL RELEASE Right 06/10/2020    Procedure: RELEASE, CARPAL TUNNEL - RIGHT;  Surgeon: Adelaida Hall MD;  Location: [facility] OR;  Service: Orthopedics;  Laterality: Right;  GENERAL AND REGIONAL    CARPAL TUNNEL RELEASE Left 05/05/2021    Procedure: RELEASE, CARPAL TUNNEL, LEFT;  Surgeon: Adelaida Hall MD;  Location: [facility] OR;  Service: Orthopedics;  Laterality: Left;  GENERAL & REGIONAL IN PACU    CARPECTOMY Left 09/06/2023    Procedure: CARPECTOMY;  Surgeon: Adelaida Hall MD;  Location: [facility] OR;  Service: Orthopedics;  Laterality: Left;  MAC/REGIONAL  LEFT PROXIMAL ROW    CLOSURE OF WOUND Left 08/06/2020    Procedure: CLOSURE, WOUND;  Surgeon: SEMAJ Márquez III, MD;  Location: [facility] OR 2ND FLR;  Service: Peripheral Vascular;  Laterality: Left;  Complex    COLONOSCOPY N/A 08/17/2021    Procedure: COLONOSCOPY Suprep;  Surgeon: Loco Deluca MD;  Location: [facility];  Service: Endoscopy;  Laterality: N/A;    ECHOCARDIOGRAM,TRANSESOPHAGEAL N/A 07/24/2023    Procedure: Transesophageal echo (REENA) intra-procedure log documentation;  Surgeon: Leyla Diagnostic Provider;  Location: [facility] EP LAB;  Service: Cardiology;  Laterality: N/A;  s/p WM, REENA, anes, 3 Prep    ESOPHAGOGASTRODUODENOSCOPY N/A 08/17/2021    Procedure: EGD (ESOPHAGOGASTRODUODENOSCOPY);  Surgeon: Loco Deluca MD;  Location: [facility] ENDO;  Service: Endoscopy;  Laterality: N/A;  Patient is schedule to have her Covid test on 08/14/2021 at the [facility] @ 9:30am. [place].    EXPLORATION OF FEMORAL ARTERY Left 07/21/2020    Procedure: EXPLORATION, ARTERY, FEMORAL;  Surgeon: SEMAJ Márquez III, MD;  Location: [facility] OR 2ND FLR;  Service: Peripheral Vascular;  Laterality: Left;  1. Urgent Direct repair, L SFA branch  laceration    FOOT SURGERY      HYSTEROSCOPY WITH DILATION AND CURETTAGE OF UTERUS N/A 02/19/2022    Procedure: HYSTEROSCOPY, WITH DILATION AND CURETTAGE OF UTERUS;  Surgeon: Shane Palomo MD;  Location: [address];  Service: OB/GYN;  Laterality: N/A;    INCISION AND DRAINAGE OF KNEE Left 05/12/2022    Procedure: INCISION AND DRAINAGE, Prepatellar bursectomy.;  Surgeon: Dre Guzmán MD;  [address];  Service: Orthopedics;  Laterality: Left;    LEFT HEART CATHETERIZATION Left 02/07/2023    Procedure: Left heart cath;  Surgeon: Josiah Terry MD;  Location: [facility] CATH LAB;  Service: Cardiology;  Laterality: Left;  borderline moderate bleeding risk 6.3%    OCCLUSION OF LEFT ATRIAL APPENDAGE N/A 06/12/2023    Procedure: Left atrial appendage occlusion;  Surgeon: Gabriel Hawley MD;  Location: [facility] EP LAB;  Service: Cardiology;  Laterality: N/A;  afib, watchman, BSCI, demi, ALIYA ibarra, 3prep    RELEASE OF ULNAR NERVE AT CUBITAL TUNNEL Bilateral     RIGHT HEART CATHETERIZATION Right 08/05/2024    Procedure: INSERTION, CATHETER, RIGHT HEART;  Surgeon: Zane Liu MD;  Location: [facility] CATH LAB;  Service: Cardiology;  Laterality: Right;    ROBOT-ASSISTED LAPAROSCOPIC ABDOMINAL HYSTERECTOMY USING DA KEIRY XI N/A 08/09/2022    Procedure: XI ROBOTIC HYSTERECTOMY;  Surgeon: Yolanda Barriga MD;  Location: [facility];  Service: OB/GYN;  Laterality: N/A;  dual console requested    ROBOT-ASSISTED LAPAROSCOPIC SALPINGO-OOPHORECTOMY Bilateral 08/09/2022    Procedure: ROBOTIC SALPINGO-OOPHORECTOMY;  Surgeon: Yolanda Barriga MD;  Location: [facility];  Service: OB/GYN;  Laterality: Bilateral;    TRANSESOPHAGEAL ECHOCARDIOGRAPHY N/A 06/12/2023    Procedure: ECHOCARDIOGRAM, TRANSESOPHAGEAL;  Surgeon: Cyril Mast MD;  Location: [facility] EP LAB;  Service: Cardiology;  Laterality: N/A;    TREATMENT OF CARDIAC ARRHYTHMIA N/A 01/29/2020    Procedure: CARDIOVERSION;  Surgeon: Gabriel Hawley MD;  Location:  [facility] EP LAB;  Service: Cardiology;  Laterality: N/A;  af, demi, dccv, anes, mb, 345    TREATMENT OF CARDIAC ARRHYTHMIA  01/24/2022    Procedure: Cardioversion or Defibrillation;  Surgeon: Gabriel Hawley MD;  Location: [facility] EP LAB;  Service: Cardiology;;    WOUND DEBRIDEMENT Left 08/06/2020    Procedure: DEBRIDEMENT, WOUND;  Surgeon: SEMAJ Márquez III, MD;  Location: [facility] OR [address] [place] [address];  Service: Peripheral Vascular;  Laterality: Left;       Home Meds:   Prior to Admission medications    Medication Sig Start Date End Date Taking? Authorizing Provider   albuterol (VENTOLIN HFA) 90 mcg/actuation inhaler Inhale 2 puffs into the lungs every 6 (six) hours as needed for Wheezing. Rescue 12/14/23 12/31/24  Gordy Cordero MD   ALPRAZolam (XANAX) 0.5 MG tablet Take 1 tablet (0.5 mg total) by mouth 2 (two) times daily as needed for Anxiety. 9/4/24   Gordy Cordero MD   b complex vitamins capsule Take 1 capsule by mouth every other day.    Provider, Historical   DULoxetine (CYMBALTA) 60 MG capsule Take 1 capsule (60 mg total) by mouth once daily. 8/12/24   Brennon Nunez MD   ferrous sulfate 325 (65 FE) MG EC tablet Take 1 tablet (325 mg total) by mouth every other day. 9/18/24   Eugene Woodward MD   furosemide (LASIX) 20 MG tablet Take 1 tablet (20 mg total) by mouth once daily. 9/19/24 9/19/25  Eugene Woodward MD   lactulose (CHRONULAC) 10 gram/15 mL solution Take 15 mLs (10 g total) by mouth 2 (two) times daily. Please ensure you are having at least 2-3 bowel movements every day. 11/12/24   Kiko Saba MD   metoprolol succinate (TOPROL-XL) 25 MG 24 hr tablet Take 3 tablets (75 mg total) by mouth 2 (two) times daily. 9/18/24 12/20/24  Eugene Woodward MD   midodrine (PROAMATINE) 5 MG Tab Take 3 tablets (15 mg total) by mouth 3 (three) times daily. 9/18/24 12/17/24  Eugene Woodward MD   multivitamin (THERAGRAN) per tablet Take 1 tablet by mouth once daily.    Provider, Historical   nystatin (MYCOSTATIN) powder Apply topically  2 (two) times daily. 11/7/24   Gordy Cordero MD   omeprazole (PRILOSEC) 20 MG capsule TAKE 1 CAPSULE BY MOUTH ONCE DAILY 7/25/24   Gordy Cordero MD   ondansetron (ZOFRAN-ODT) 4 MG TbDL Take 2 tablets (8 mg total) by mouth every 8 (eight) hours as needed (nasuea, vomiting). 10/28/24   Gordy Cordero MD   oxyCODONE (ROXICODONE) 5 MG immediate release tablet Take 1 tablet (5 mg total) by mouth every 12 (twelve) hours as needed for Pain. 12/5/24   Gordy Cordero MD   spironolactone (ALDACTONE) 50 MG tablet Take 1 tablet (50 mg total) by mouth once daily. 11/12/24 6/10/25  Kiko Saba MD   medroxyPROGESTERone (PROVERA) 10 MG tablet Take 2 tablets (20 mg total) by mouth 3 (three) times daily. 7/13/22 8/9/22  Yolanda Barriga MD     Anticoagulants/Antiplatelets: no anticoagulation    Allergies:   Review of patient's allergies indicates:   Allergen Reactions    Flecainide Shortness Of Breath and Swelling    Shellfish containing products Other (See Comments)     Makes gout terrible    Vancomycin analogues Hives, Itching and Rash     Sedation History:  no adverse reactions    Review of Systems:   Hematological: no known coagulopathies  Respiratory: no shortness of breath  Cardiovascular: no chest pain  Gastrointestinal: no abdominal pain  Genito-Urinary: no dysuria  Musculoskeletal: negative  Neurological: no TIA or stroke symptoms         OBJECTIVE:     Vital Signs (Most Recent)  Temp: 98.1 °F (36.7 °C) (01/02/25 0944)  Pulse: 61 (01/02/25 0944)  Resp: 18 (01/02/25 0944)  BP: (!) 148/77 (01/02/25 0944)  SpO2: 98 % (01/02/25 0944)    Physical Exam:  ASA: 2  Mallampati: n/a    General: no acute distress  Mental Status: alert and oriented to person, place and time  HEENT: normocephalic, atraumatic  Chest: unlabored breathing  Heart: regular heart rate  Abdomen: distended  Extremity: moves all extremities    ASSESSMENT/PLAN:     Sedation Plan: Local  Patient will undergo US guided paracentesis.    Shannen  GAYATRI Callahan  Interventional Radiology  [phone]

## 2025-01-07 ENCOUNTER — PATIENT MESSAGE (OUTPATIENT)
Dept: ELECTROPHYSIOLOGY | Facility: CLINIC | Age: 60
End: 2025-01-07
Payer: MEDICAID

## 2025-01-08 ENCOUNTER — HOSPITAL ENCOUNTER (OUTPATIENT)
Dept: INTERVENTIONAL RADIOLOGY/VASCULAR | Facility: HOSPITAL | Age: 60
Discharge: HOME | End: 2025-01-08
Attending: INTERNAL MEDICINE
Payer: MEDICAID

## 2025-01-08 ENCOUNTER — PATIENT MESSAGE (OUTPATIENT)
Dept: ELECTROPHYSIOLOGY | Facility: CLINIC | Age: 60
End: 2025-01-08
Payer: MEDICAID

## 2025-01-08 VITALS
OXYGEN SATURATION: 96 % | RESPIRATION RATE: 16 BRPM | SYSTOLIC BLOOD PRESSURE: 102 MMHG | HEART RATE: 55 BPM | DIASTOLIC BLOOD PRESSURE: 62 MMHG

## 2025-01-08 DIAGNOSIS — R18.8 CIRRHOSIS OF LIVER WITH ASCITES, UNSPECIFIED HEPATIC CIRRHOSIS TYPE: Chronic | ICD-10-CM

## 2025-01-08 DIAGNOSIS — K74.60 CIRRHOSIS OF LIVER WITH ASCITES, UNSPECIFIED HEPATIC CIRRHOSIS TYPE: Chronic | ICD-10-CM

## 2025-01-08 RX ORDER — LIDOCAINE HYDROCHLORIDE 10 MG/ML
INJECTION, SOLUTION EPIDURAL; INFILTRATION; INTRACAUDAL; PERINEURAL
Status: DISPENSED
Start: 2025-01-08 | End: 2025-01-08

## 2025-01-08 NOTE — DISCHARGE INSTRUCTIONS
For questions or concerns call: JESICA MON-FRI 8 AM- 5PM [phone]. Radiology resident on call [phone].

## 2025-01-08 NOTE — PLAN OF CARE
Pt arrived to MPU bay 3 for paracentesis. Pt oriented to unit and staff. Plan of care reviewed with patient, patient verbalizes understanding. Comfort measures utilized. Pt safely transferred to [facility]. Fall risk reviewed with patient, fall risk interventions maintained. Blankets applied. Pt prepped and draped utilizing standard sterile technique. Patient placed on continuous monitoring, as required by sedation policy. Timeouts completed utilizing standard universal time-out, per department and facility policy. RN to remain at bedside, continuous monitoring maintained. Pt resting comfortably. Denies pain/discomfort. Will continue to monitor. See flow sheets for monitoring, medication administration, and updates.

## 2025-01-08 NOTE — H&P
Radiology History & Physical      SUBJECTIVE:     Chief Complaint: abdominal distention    History of Present Illness:  Vicky Alvarado is a 59 y.o. female who presents for ultrasound guided paracentesis    Past Medical History:   Diagnosis Date    Anticoagulant long-term use     Arthritis     Atrial fibrillation     cardioversion    Atrial fibrillation with RVR     HAS WATCHMAN IN PLACE NOW    Avascular necrosis     L hand    Cellulitis of extremity 05/07/2022    CHF (congestive heart failure)     Chronic fatigue     Cirrhosis of liver with ascites     Depression     Diabetes mellitus     Encounter for blood transfusion 07/22/2020    Encounter for blood transfusion 03/2022    Fatty liver     GERD (gastroesophageal reflux disease)     Hx of psychiatric care     Hypertension     Iron deficiency anemia 05/07/2022    TIBC 444 with saturated iron 8    Left leg cellulitis 05/07/2022    Liver disease     Obese     Pre-diabetes 05/07/2022    Psychiatric problem     Sleep apnea     wears cpap    SOB (shortness of breath) on exertion     Weight loss     75lb intentional weight loss     Past Surgical History:   Procedure Laterality Date    ABLATION OF ARRHYTHMOGENIC FOCUS FOR ATRIAL FIBRILLATION N/A 07/20/2020    Procedure: Ablation atrial fibrillation;  Surgeon: Gabriel Hawley MD;  Location: [facility] EP LAB;  Service: Cardiology;  Laterality: N/A;  afib, PVI, RFA, REENA, SHADY, anes, MB, 3 Prep    ABLATION OF ARRHYTHMOGENIC FOCUS FOR ATRIAL FIBRILLATION N/A 01/24/2022    Procedure: Ablation atrial fibrillation;  Surgeon: Gabriel Hawley MD;  Location: [facility] EP LAB;  Service: Cardiology;  Laterality: N/A;  afib/afl, PVI (re-do)/CTI, RFA, REENA (cx if SR), SHADY, anes, MB, 3 Prep    APPLICATION OF WOUND VACUUM-ASSISTED CLOSURE DEVICE Left 08/03/2020    Procedure: APPLICATION, WOUND VAC;  Surgeon: SEMAJ Márquez III, MD;  Location: [facility] OR [address] [place] [address];  Service: Peripheral Vascular;  Laterality: Left;    APPLICATION OF WOUND  VACUUM-ASSISTED CLOSURE DEVICE Left 08/06/2020    Procedure: APPLICATION, WOUND VAC;  Surgeon: SEMAJ Márquez III, MD;  Location: [facility] OR 2ND FLR;  Service: Peripheral Vascular;  Laterality: Left;    CARPAL TUNNEL RELEASE Right 06/10/2020    Procedure: RELEASE, CARPAL TUNNEL - RIGHT;  Surgeon: Adelaida Hall MD;  Location: [facility] OR;  Service: Orthopedics;  Laterality: Right;  GENERAL AND REGIONAL    CARPAL TUNNEL RELEASE Left 05/05/2021    Procedure: RELEASE, CARPAL TUNNEL, LEFT;  Surgeon: Adelaida Hall MD;  Location: [facility] OR;  Service: Orthopedics;  Laterality: Left;  GENERAL & REGIONAL IN PACU    CARPECTOMY Left 09/06/2023    Procedure: CARPECTOMY;  Surgeon: Adelaida Hall MD;  Location: [facility] OR;  Service: Orthopedics;  Laterality: Left;  MAC/REGIONAL  LEFT PROXIMAL ROW    CLOSURE OF WOUND Left 08/06/2020    Procedure: CLOSURE, WOUND;  Surgeon: SEMAJ Márquez III, MD;  Location: [facility] OR 2ND FLR;  Service: Peripheral Vascular;  Laterality: Left;  Complex    COLONOSCOPY N/A 08/17/2021    Procedure: COLONOSCOPY Suprep;  Surgeon: Loco Deluca MD;  Location: [facility];  Service: Endoscopy;  Laterality: N/A;    ECHOCARDIOGRAM,TRANSESOPHAGEAL N/A 07/24/2023    Procedure: Transesophageal echo (REENA) intra-procedure log documentation;  Surgeon: Leyla Diagnostic Provider;  Location: [facility] EP LAB;  Service: Cardiology;  Laterality: N/A;  s/p WM, REENA, anes, 3 Prep    ESOPHAGOGASTRODUODENOSCOPY N/A 08/17/2021    Procedure: EGD (ESOPHAGOGASTRODUODENOSCOPY);  Surgeon: Loco Deluca MD;  Location: [facility] ENDO;  Service: Endoscopy;  Laterality: N/A;  Patient is schedule to have her Covid test on 08/14/2021 at the [facility] @ 9:30am. [place].    EXPLORATION OF FEMORAL ARTERY Left 07/21/2020    Procedure: EXPLORATION, ARTERY, FEMORAL;  Surgeon: SEMAJ Márquez III, MD;  Location: [facility] OR 2ND FLR;  Service: Peripheral Vascular;  Laterality: Left;  1. Urgent Direct repair, L SFA branch  laceration    FOOT SURGERY      HYSTEROSCOPY WITH DILATION AND CURETTAGE OF UTERUS N/A 02/19/2022    Procedure: HYSTEROSCOPY, WITH DILATION AND CURETTAGE OF UTERUS;  Surgeon: Shane Palomo MD;  Location: [address];  Service: OB/GYN;  Laterality: N/A;    INCISION AND DRAINAGE OF KNEE Left 05/12/2022    Procedure: INCISION AND DRAINAGE, Prepatellar bursectomy.;  Surgeon: Dre Guzmán MD;  [address];  Service: Orthopedics;  Laterality: Left;    LEFT HEART CATHETERIZATION Left 02/07/2023    Procedure: Left heart cath;  Surgeon: Josiah Terry MD;  Location: [facility] CATH LAB;  Service: Cardiology;  Laterality: Left;  borderline moderate bleeding risk 6.3%    OCCLUSION OF LEFT ATRIAL APPENDAGE N/A 06/12/2023    Procedure: Left atrial appendage occlusion;  Surgeon: Gabriel Hawley MD;  Location: [facility] EP LAB;  Service: Cardiology;  Laterality: N/A;  afib, watchman, BSCI, demi, ALIYA ibarra, 3prep    RELEASE OF ULNAR NERVE AT CUBITAL TUNNEL Bilateral     RIGHT HEART CATHETERIZATION Right 08/05/2024    Procedure: INSERTION, CATHETER, RIGHT HEART;  Surgeon: Zane Liu MD;  Location: [facility] CATH LAB;  Service: Cardiology;  Laterality: Right;    ROBOT-ASSISTED LAPAROSCOPIC ABDOMINAL HYSTERECTOMY USING DA KEIRY XI N/A 08/09/2022    Procedure: XI ROBOTIC HYSTERECTOMY;  Surgeon: Yolanda Barriga MD;  Location: [facility];  Service: OB/GYN;  Laterality: N/A;  dual console requested    ROBOT-ASSISTED LAPAROSCOPIC SALPINGO-OOPHORECTOMY Bilateral 08/09/2022    Procedure: ROBOTIC SALPINGO-OOPHORECTOMY;  Surgeon: Yolanda Barriga MD;  Location: [facility];  Service: OB/GYN;  Laterality: Bilateral;    TRANSESOPHAGEAL ECHOCARDIOGRAPHY N/A 06/12/2023    Procedure: ECHOCARDIOGRAM, TRANSESOPHAGEAL;  Surgeon: Cyril Mast MD;  Location: [facility] EP LAB;  Service: Cardiology;  Laterality: N/A;    TREATMENT OF CARDIAC ARRHYTHMIA N/A 01/29/2020    Procedure: CARDIOVERSION;  Surgeon: Gabriel Hawley MD;  Location:  [facility] EP LAB;  Service: Cardiology;  Laterality: N/A;  af, demi, dccv, anes, mb, 345    TREATMENT OF CARDIAC ARRHYTHMIA  01/24/2022    Procedure: Cardioversion or Defibrillation;  Surgeon: Gabriel Hawley MD;  Location: [facility] EP LAB;  Service: Cardiology;;    WOUND DEBRIDEMENT Left 08/06/2020    Procedure: DEBRIDEMENT, WOUND;  Surgeon: SEMAJ Márquez III, MD;  Location: [facility] OR [address] [place] [address];  Service: Peripheral Vascular;  Laterality: Left;       Home Meds:   Prior to Admission medications    Medication Sig Start Date End Date Taking? Authorizing Provider   albuterol (VENTOLIN HFA) 90 mcg/actuation inhaler Inhale 2 puffs into the lungs every 6 (six) hours as needed for Wheezing. Rescue 12/14/23 12/31/24  Gordy Cordero MD   ALPRAZolam (XANAX) 0.5 MG tablet Take 1 tablet (0.5 mg total) by mouth 2 (two) times daily as needed for Anxiety. 9/4/24   Gordy Cordero MD   b complex vitamins capsule Take 1 capsule by mouth every other day.    Provider, Historical   DULoxetine (CYMBALTA) 60 MG capsule Take 1 capsule (60 mg total) by mouth once daily. 8/12/24   Brennon Nunez MD   ferrous sulfate 325 (65 FE) MG EC tablet Take 1 tablet (325 mg total) by mouth every other day. 9/18/24   Eugene Woodward MD   furosemide (LASIX) 20 MG tablet Take 1 tablet (20 mg total) by mouth once daily. 9/19/24 9/19/25  Eugene Woodward MD   lactulose (CHRONULAC) 10 gram/15 mL solution Take 15 mLs (10 g total) by mouth 2 (two) times daily. Please ensure you are having at least 2-3 bowel movements every day. 11/12/24   Kiko Saba MD   metoprolol succinate (TOPROL-XL) 25 MG 24 hr tablet Take 3 tablets (75 mg total) by mouth 2 (two) times daily. 9/18/24 12/20/24  Eugene Woodward MD   midodrine (PROAMATINE) 5 MG Tab Take 3 tablets (15 mg total) by mouth 3 (three) times daily. 9/18/24 2/6/25  Eugene Woodward MD   multivitamin (THERAGRAN) per tablet Take 1 tablet by mouth once daily.    Provider, Historical   nystatin (MYCOSTATIN) powder Apply topically 2  (two) times daily. 11/7/24   Gordy Cordero MD   omeprazole (PRILOSEC) 20 MG capsule TAKE 1 CAPSULE BY MOUTH ONCE DAILY 7/25/24   Gordy Cordero MD   ondansetron (ZOFRAN-ODT) 4 MG TbDL Take 2 tablets (8 mg total) by mouth every 8 (eight) hours as needed (nasuea, vomiting). 10/28/24   Gordy Cordero MD   oxyCODONE (ROXICODONE) 5 MG immediate release tablet Take 1 tablet (5 mg total) by mouth every 12 (twelve) hours as needed for Pain. 12/5/24   Gordy Cordero MD   spironolactone (ALDACTONE) 50 MG tablet Take 1 tablet (50 mg total) by mouth once daily. 11/12/24 6/10/25  Kiko Saba MD   medroxyPROGESTERone (PROVERA) 10 MG tablet Take 2 tablets (20 mg total) by mouth 3 (three) times daily. 7/13/22 8/9/22  Yolanda Barriga MD     Anticoagulants/Antiplatelets: no anticoagulation    Allergies:   Review of patient's allergies indicates:   Allergen Reactions    Flecainide Shortness Of Breath and Swelling    Shellfish containing products Other (See Comments)     Makes gout terrible    Vancomycin analogues Hives, Itching and Rash     Sedation History:  no adverse reactions    Review of Systems:   Hematological: no known coagulopathies  Respiratory: no shortness of breath  Cardiovascular: no chest pain  Gastrointestinal: no abdominal pain  Genito-Urinary: no dysuria  Musculoskeletal: negative  Neurological: no TIA or stroke symptoms         OBJECTIVE:     Vital Signs (Most Recent)  Pulse: 60 (01/08/25 1014)  Resp: 16 (01/08/25 1014)  BP: 120/81 (01/08/25 1014)  SpO2: 99 % (01/08/25 1014)    Physical Exam:  ASA: 2  Mallampati: n/a    General: no acute distress  Mental Status: alert and oriented to person, place and time  HEENT: normocephalic, atraumatic  Chest: unlabored breathing  Heart: regular heart rate  Abdomen: distended  Extremity: moves all extremities    ASSESSMENT/PLAN:     Sedation Plan: local  Patient will undergo ultrasound guided paracentesis.    Bushra Epps PA-C  Interventional Radiology    Spectra: [id number]

## 2025-01-08 NOTE — PROCEDURES
Radiology Post-Procedure Note    Pre Op Diagnosis: Ascites  Post Op Diagnosis: Same    Procedure: Ultrasound Guided Paracentesis    Procedure performed by: Bushra Epps PA-C    Written Informed Consent Obtained: Yes  Specimen Removed: YES, clear yellow fluid  Estimated Blood Loss: Minimal    Findings:   Successful paracentesis. Access obtained in the LLQ. Albumin administered PRN per protocol.    Patient tolerated procedure well.    Bushra Epps PA-C  Interventional Radiology   Spectra: [phone]

## 2025-01-08 NOTE — PROGRESS NOTES
Paracentesis complete. Patient tolerated well. 3350cc removed from left abdomen. Dressing applied clean, dry and intact. Patient refused discharge paperwork. Patient ambulated to garage via walker.

## 2025-01-12 ENCOUNTER — PATIENT MESSAGE (OUTPATIENT)
Dept: HEPATOLOGY | Facility: CLINIC | Age: 60
End: 2025-01-12
Payer: MEDICAID

## 2025-01-12 DIAGNOSIS — R18.8 CIRRHOSIS OF LIVER WITH ASCITES, UNSPECIFIED HEPATIC CIRRHOSIS TYPE: Chronic | ICD-10-CM

## 2025-01-12 DIAGNOSIS — K74.60 CIRRHOSIS OF LIVER WITH ASCITES, UNSPECIFIED HEPATIC CIRRHOSIS TYPE: Chronic | ICD-10-CM

## 2025-01-12 DIAGNOSIS — R18.8 OTHER ASCITES: Primary | Chronic | ICD-10-CM

## 2025-01-13 ENCOUNTER — PATIENT MESSAGE (OUTPATIENT)
Dept: HEPATOLOGY | Facility: CLINIC | Age: 60
End: 2025-01-13
Payer: MEDICAID

## 2025-01-13 DIAGNOSIS — R18.8 CIRRHOSIS OF LIVER WITH ASCITES, UNSPECIFIED HEPATIC CIRRHOSIS TYPE: Chronic | ICD-10-CM

## 2025-01-13 DIAGNOSIS — K74.60 CIRRHOSIS OF LIVER WITH ASCITES, UNSPECIFIED HEPATIC CIRRHOSIS TYPE: Chronic | ICD-10-CM

## 2025-01-13 DIAGNOSIS — K76.0 METABOLIC DYSFUNCTION-ASSOCIATED STEATOTIC LIVER DISEASE (MASLD): Primary | Chronic | ICD-10-CM

## 2025-01-14 RX ORDER — LACTULOSE 10 G/15ML
20 SOLUTION ORAL; RECTAL 3 TIMES DAILY
Qty: 900 ML | Refills: 6 | Status: SHIPPED | OUTPATIENT
Start: 2025-01-14

## 2025-01-17 ENCOUNTER — HOSPITAL ENCOUNTER (OUTPATIENT)
Dept: INTERVENTIONAL RADIOLOGY/VASCULAR | Facility: HOSPITAL | Age: 60
Discharge: HOME | End: 2025-01-17
Attending: INTERNAL MEDICINE
Payer: MEDICAID

## 2025-01-17 VITALS
DIASTOLIC BLOOD PRESSURE: 63 MMHG | HEART RATE: 60 BPM | RESPIRATION RATE: 18 BRPM | OXYGEN SATURATION: 100 % | TEMPERATURE: 99 F | SYSTOLIC BLOOD PRESSURE: 129 MMHG

## 2025-01-17 DIAGNOSIS — K74.60 CIRRHOSIS OF LIVER WITH ASCITES, UNSPECIFIED HEPATIC CIRRHOSIS TYPE: Chronic | ICD-10-CM

## 2025-01-17 DIAGNOSIS — R18.8 OTHER ASCITES: Chronic | ICD-10-CM

## 2025-01-17 DIAGNOSIS — R18.8 CIRRHOSIS OF LIVER WITH ASCITES, UNSPECIFIED HEPATIC CIRRHOSIS TYPE: Chronic | ICD-10-CM

## 2025-01-17 PROCEDURE — 49083 ABD PARACENTESIS W/IMAGING: CPT | Mod: ,,, | Performed by: FAMILY MEDICINE

## 2025-01-17 PROCEDURE — C1729 CATH, DRAINAGE: HCPCS

## 2025-01-17 PROCEDURE — 49083 ABD PARACENTESIS W/IMAGING: CPT | Performed by: RADIOLOGY

## 2025-01-17 NOTE — H&P
Radiology History & Physical      SUBJECTIVE:     Chief Complaint: abdominal distention    History of Present Illness:  Vicky Alvarado is a 59 y.o. female who presents for ultrasound guided paracentesis  Past Medical History:   Diagnosis Date    Anticoagulant long-term use     Arthritis     Atrial fibrillation     cardioversion    Atrial fibrillation with RVR     HAS WATCHMAN IN PLACE NOW    Avascular necrosis     L hand    Cellulitis of extremity 05/07/2022    CHF (congestive heart failure)     Chronic fatigue     Cirrhosis of liver with ascites     Depression     Diabetes mellitus     Encounter for blood transfusion 07/22/2020    Encounter for blood transfusion 03/2022    Fatty liver     GERD (gastroesophageal reflux disease)     Hx of psychiatric care     Hypertension     Iron deficiency anemia 05/07/2022    TIBC 444 with saturated iron 8    Left leg cellulitis 05/07/2022    Liver disease     Obese     Pre-diabetes 05/07/2022    Psychiatric problem     Sleep apnea     wears cpap    SOB (shortness of breath) on exertion     Weight loss     75lb intentional weight loss     Past Surgical History:   Procedure Laterality Date    ABLATION OF ARRHYTHMOGENIC FOCUS FOR ATRIAL FIBRILLATION N/A 07/20/2020    Procedure: Ablation atrial fibrillation;  Surgeon: Gabriel Hawley MD;  Location: [facility] EP LAB;  Service: Cardiology;  Laterality: N/A;  afib, PVI, RFA, REENA, SHADY, anes, MB, 3 Prep    ABLATION OF ARRHYTHMOGENIC FOCUS FOR ATRIAL FIBRILLATION N/A 01/24/2022    Procedure: Ablation atrial fibrillation;  Surgeon: Gabriel Hawley MD;  Location: [facility] EP LAB;  Service: Cardiology;  Laterality: N/A;  afib/afl, PVI (re-do)/CTI, RFA, REENA (cx if SR), SHADY, anes, MB, 3 Prep    APPLICATION OF WOUND VACUUM-ASSISTED CLOSURE DEVICE Left 08/03/2020    Procedure: APPLICATION, WOUND VAC;  Surgeon: SEMAJ Márquez III, MD;  Location: [facility] OR [address] [place] [address];  Service: Peripheral Vascular;  Laterality: Left;    APPLICATION OF WOUND  VACUUM-ASSISTED CLOSURE DEVICE Left 08/06/2020    Procedure: APPLICATION, WOUND VAC;  Surgeon: SEMAJ Márquez III, MD;  Location: [facility] OR 2ND FLR;  Service: Peripheral Vascular;  Laterality: Left;    CARPAL TUNNEL RELEASE Right 06/10/2020    Procedure: RELEASE, CARPAL TUNNEL - RIGHT;  Surgeon: Adelaida Hall MD;  Location: [facility] OR;  Service: Orthopedics;  Laterality: Right;  GENERAL AND REGIONAL    CARPAL TUNNEL RELEASE Left 05/05/2021    Procedure: RELEASE, CARPAL TUNNEL, LEFT;  Surgeon: Adelaida Hall MD;  Location: [facility] OR;  Service: Orthopedics;  Laterality: Left;  GENERAL & REGIONAL IN PACU    CARPECTOMY Left 09/06/2023    Procedure: CARPECTOMY;  Surgeon: Adelaida Hall MD;  Location: [facility] OR;  Service: Orthopedics;  Laterality: Left;  MAC/REGIONAL  LEFT PROXIMAL ROW    CLOSURE OF WOUND Left 08/06/2020    Procedure: CLOSURE, WOUND;  Surgeon: SEMAJ Márquez III, MD;  Location: [facility] OR 2ND FLR;  Service: Peripheral Vascular;  Laterality: Left;  Complex    COLONOSCOPY N/A 08/17/2021    Procedure: COLONOSCOPY Suprep;  Surgeon: Loco Deluca MD;  Location: [facility];  Service: Endoscopy;  Laterality: N/A;    ECHOCARDIOGRAM,TRANSESOPHAGEAL N/A 07/24/2023    Procedure: Transesophageal echo (REENA) intra-procedure log documentation;  Surgeon: Leyla Diagnostic Provider;  Location: [facility] EP LAB;  Service: Cardiology;  Laterality: N/A;  s/p WM, REENA, anes, 3 Prep    ESOPHAGOGASTRODUODENOSCOPY N/A 08/17/2021    Procedure: EGD (ESOPHAGOGASTRODUODENOSCOPY);  Surgeon: Loco Deluca MD;  Location: [facility] ENDO;  Service: Endoscopy;  Laterality: N/A;  Patient is schedule to have her Covid test on 08/14/2021 at the [facility] @ 9:30am. [place].    EXPLORATION OF FEMORAL ARTERY Left 07/21/2020    Procedure: EXPLORATION, ARTERY, FEMORAL;  Surgeon: SEMAJ Márquez III, MD;  Location: [facility] OR 2ND FLR;  Service: Peripheral Vascular;  Laterality: Left;  1. Urgent Direct repair, L SFA branch  laceration    FOOT SURGERY      HYSTEROSCOPY WITH DILATION AND CURETTAGE OF UTERUS N/A 02/19/2022    Procedure: HYSTEROSCOPY, WITH DILATION AND CURETTAGE OF UTERUS;  Surgeon: Shane Palomo MD;  Location: [address];  Service: OB/GYN;  Laterality: N/A;    INCISION AND DRAINAGE OF KNEE Left 05/12/2022    Procedure: INCISION AND DRAINAGE, Prepatellar bursectomy.;  Surgeon: Dre Guzmán MD;  [address];  Service: Orthopedics;  Laterality: Left;    LEFT HEART CATHETERIZATION Left 02/07/2023    Procedure: Left heart cath;  Surgeon: Josiah Terry MD;  Location: [facility] CATH LAB;  Service: Cardiology;  Laterality: Left;  borderline moderate bleeding risk 6.3%    OCCLUSION OF LEFT ATRIAL APPENDAGE N/A 06/12/2023    Procedure: Left atrial appendage occlusion;  Surgeon: Gabriel Hawley MD;  Location: [facility] EP LAB;  Service: Cardiology;  Laterality: N/A;  afib, watchman, BSCI, demi, ALIYA ibarra, 3prep    RELEASE OF ULNAR NERVE AT CUBITAL TUNNEL Bilateral     RIGHT HEART CATHETERIZATION Right 08/05/2024    Procedure: INSERTION, CATHETER, RIGHT HEART;  Surgeon: Zane Liu MD;  Location: [facility] CATH LAB;  Service: Cardiology;  Laterality: Right;    ROBOT-ASSISTED LAPAROSCOPIC ABDOMINAL HYSTERECTOMY USING DA KEIRY XI N/A 08/09/2022    Procedure: XI ROBOTIC HYSTERECTOMY;  Surgeon: Yolanda Barriga MD;  Location: [facility];  Service: OB/GYN;  Laterality: N/A;  dual console requested    ROBOT-ASSISTED LAPAROSCOPIC SALPINGO-OOPHORECTOMY Bilateral 08/09/2022    Procedure: ROBOTIC SALPINGO-OOPHORECTOMY;  Surgeon: Yolanda Barriga MD;  Location: [facility];  Service: OB/GYN;  Laterality: Bilateral;    TRANSESOPHAGEAL ECHOCARDIOGRAPHY N/A 06/12/2023    Procedure: ECHOCARDIOGRAM, TRANSESOPHAGEAL;  Surgeon: Cyril Mast MD;  Location: [facility] EP LAB;  Service: Cardiology;  Laterality: N/A;    TREATMENT OF CARDIAC ARRHYTHMIA N/A 01/29/2020    Procedure: CARDIOVERSION;  Surgeon: Gabriel Hawley MD;  Location:  [facility] EP LAB;  Service: Cardiology;  Laterality: N/A;  af, demi, dccv, anes, mb, 345    TREATMENT OF CARDIAC ARRHYTHMIA  01/24/2022    Procedure: Cardioversion or Defibrillation;  Surgeon: Gabriel Hawley MD;  Location: [facility] EP LAB;  Service: Cardiology;;    WOUND DEBRIDEMENT Left 08/06/2020    Procedure: DEBRIDEMENT, WOUND;  Surgeon: SEMAJ Márquez III, MD;  Location: [facility] OR [address] [place] [address];  Service: Peripheral Vascular;  Laterality: Left;       Home Meds:   Prior to Admission medications    Medication Sig Start Date End Date Taking? Authorizing Provider   albuterol (VENTOLIN HFA) 90 mcg/actuation inhaler Inhale 2 puffs into the lungs every 6 (six) hours as needed for Wheezing. Rescue 1/8/25 1/8/26  Gordy Cordero MD   ALPRAZolam (XANAX) 0.5 MG tablet Take 1 tablet (0.5 mg total) by mouth 2 (two) times daily as needed for Anxiety. 9/4/24   Gordy Cordero MD   b complex vitamins capsule Take 1 capsule by mouth every other day.    Provider, Historical   DULoxetine (CYMBALTA) 60 MG capsule Take 1 capsule (60 mg total) by mouth once daily. 8/12/24   Brennon Nunez MD   ferrous sulfate 325 (65 FE) MG EC tablet Take 1 tablet (325 mg total) by mouth every other day. 9/18/24   Eugene Woodward MD   furosemide (LASIX) 20 MG tablet Take 1 tablet (20 mg total) by mouth once daily. 9/19/24 9/19/25  Eugene Woodward MD   lactulose (CHRONULAC) 10 gram/15 mL solution Take 30 mLs (20 g total) by mouth 3 (three) times daily. Please ensure you are having at least 2-3 bowel movements every day. 1/14/25   iKko Saba MD   metoprolol succinate (TOPROL-XL) 25 MG 24 hr tablet Take 3 tablets (75 mg total) by mouth 2 (two) times daily. 9/18/24 12/20/24  Eugene Woodward MD   midodrine (PROAMATINE) 5 MG Tab Take 3 tablets (15 mg total) by mouth 3 (three) times daily. 9/18/24 2/7/25  Eugene Woodward MD   multivitamin (THERAGRAN) per tablet Take 1 tablet by mouth once daily.    Provider, Historical   nystatin (MYCOSTATIN) powder Apply topically 2  (two) times daily. 11/7/24   Gordy Cordero MD   omeprazole (PRILOSEC) 20 MG capsule TAKE 1 CAPSULE BY MOUTH ONCE DAILY 7/25/24   Gordy Cordero MD   ondansetron (ZOFRAN-ODT) 4 MG TbDL Take 2 tablets (8 mg total) by mouth every 8 (eight) hours as needed (nasuea, vomiting). 1/8/25   Gordy Cordero MD   oxyCODONE (ROXICODONE) 5 MG immediate release tablet Take 1 tablet (5 mg total) by mouth every 12 (twelve) hours as needed for Pain. 1/8/25   Gordy Cordero MD   spironolactone (ALDACTONE) 50 MG tablet Take 1 tablet (50 mg total) by mouth once daily. 11/12/24 6/10/25  Kiko Saba MD   medroxyPROGESTERone (PROVERA) 10 MG tablet Take 2 tablets (20 mg total) by mouth 3 (three) times daily. 7/13/22 8/9/22  Yolanda Barriga MD     Anticoagulants/Antiplatelets: no anticoagulation    Allergies:   Review of patient's allergies indicates:   Allergen Reactions    Flecainide Shortness Of Breath and Swelling    Shellfish containing products Other (See Comments)     Makes gout terrible    Vancomycin analogues Hives, Itching and Rash     Sedation History:  no adverse reactions    Review of Systems:   Hematological: no known coagulopathies  Respiratory: no shortness of breath  Cardiovascular: no chest pain  Gastrointestinal: no abdominal pain  Genito-Urinary: no dysuria  Musculoskeletal: negative  Neurological: no TIA or stroke symptoms         OBJECTIVE:     Vital Signs (Most Recent)       Physical Exam:  ASA: 2  Mallampati: n/a    General: no acute distress  Mental Status: alert and oriented to person, place and time  HEENT: normocephalic, atraumatic  Chest: unlabored breathing  Heart: regular heart rate  Abdomen: distended  Extremity: moves all extremities    ASSESSMENT/PLAN:     Sedation Plan: local  Patient will undergo ultrasound guided paracentesis.    SCOUT Wolfe, ELINAP  Interventional Radiology  [phone] [facility]

## 2025-01-17 NOTE — PROCEDURES
Radiology Post-Procedure Note    Pre Op Diagnosis: Ascites  Post Op Diagnosis: Same    Procedure: Ultrasound Guided Paracentesis    Procedure performed by: Jannet CUNNINGHAM, Samantha     Written Informed Consent Obtained: Yes  Specimen Removed: YES serous  Estimated Blood Loss: Minimal    Findings:   Successful LLQ paracentesis.  Albumin administered PRN per protocol.    Patient tolerated procedure well.    Samantha Power, APRN, FNP  Interventional Radiology  [phone] clinic

## 2025-01-17 NOTE — SEDATION DOCUMENTATION
Pt received into [facility] for paracentesis. Pt oriented to unit, call bell, and order of events. VS obtained. Patient AAOx3, no distress noted, respirations even and unlabored, will continue to monitor. Allergies reviewed. Waiting for eval and consent.  Vitals:    01/17/25 0920   BP: 129/63   Pulse: 60   Resp: 18   Temp:

## 2025-01-17 NOTE — SEDATION DOCUMENTATION
Paracentesis completed. Removed 4500ml. No albumin given. Patient AAOx3, no distress noted, respirations even and unlabored. Pt politely declined AVS.  Vitals:    01/17/25 0826   BP: (!) 155/74   Pulse: 61   Resp: (!) 22

## 2025-01-24 ENCOUNTER — HOSPITAL ENCOUNTER (OUTPATIENT)
Dept: INTERVENTIONAL RADIOLOGY/VASCULAR | Facility: HOSPITAL | Age: 60
Discharge: HOME | End: 2025-01-24
Attending: INTERNAL MEDICINE
Payer: MEDICAID

## 2025-01-24 VITALS
OXYGEN SATURATION: 97 % | RESPIRATION RATE: 16 BRPM | DIASTOLIC BLOOD PRESSURE: 67 MMHG | HEART RATE: 66 BPM | SYSTOLIC BLOOD PRESSURE: 151 MMHG

## 2025-01-24 DIAGNOSIS — K74.60 CIRRHOSIS OF LIVER WITH ASCITES, UNSPECIFIED HEPATIC CIRRHOSIS TYPE: Chronic | ICD-10-CM

## 2025-01-24 DIAGNOSIS — R18.8 CIRRHOSIS OF LIVER WITH ASCITES, UNSPECIFIED HEPATIC CIRRHOSIS TYPE: Chronic | ICD-10-CM

## 2025-01-24 DIAGNOSIS — R18.8 OTHER ASCITES: Chronic | ICD-10-CM

## 2025-01-24 PROCEDURE — 49083 ABD PARACENTESIS W/IMAGING: CPT | Performed by: RADIOLOGY

## 2025-01-24 NOTE — H&P
Radiology History & Physical      SUBJECTIVE:     Chief Complaint: Ascites    History of Present Illness:  Vicky Alvarado is a 59 y.o. female who presents for ultrasound guided paracentesis.  Past Medical History:   Diagnosis Date    Anticoagulant long-term use     Arthritis     Atrial fibrillation     cardioversion    Atrial fibrillation with RVR     HAS WATCHMAN IN PLACE NOW    Avascular necrosis     L hand    Cellulitis of extremity 05/07/2022    CHF (congestive heart failure)     Chronic fatigue     Cirrhosis of liver with ascites     Depression     Diabetes mellitus     Encounter for blood transfusion 07/22/2020    Encounter for blood transfusion 03/2022    Fatty liver     GERD (gastroesophageal reflux disease)     Hx of psychiatric care     Hypertension     Iron deficiency anemia 05/07/2022    TIBC 444 with saturated iron 8    Left leg cellulitis 05/07/2022    Liver disease     Obese     Pre-diabetes 05/07/2022    Psychiatric problem     Sleep apnea     wears cpap    SOB (shortness of breath) on exertion     Weight loss     75lb intentional weight loss     Past Surgical History:   Procedure Laterality Date    ABLATION OF ARRHYTHMOGENIC FOCUS FOR ATRIAL FIBRILLATION N/A 07/20/2020    Procedure: Ablation atrial fibrillation;  Surgeon: Gabriel Hawley MD;  Location: [facility] EP LAB;  Service: Cardiology;  Laterality: N/A;  afib, PVI, RFA, REENA, SHADY, anes, MB, 3 Prep    ABLATION OF ARRHYTHMOGENIC FOCUS FOR ATRIAL FIBRILLATION N/A 01/24/2022    Procedure: Ablation atrial fibrillation;  Surgeon: Gabriel Hawley MD;  Location: [facility] EP LAB;  Service: Cardiology;  Laterality: N/A;  afib/afl, PVI (re-do)/CTI, RFA, REENA (cx if SR), SHADY, anes, MB, 3 Prep    APPLICATION OF WOUND VACUUM-ASSISTED CLOSURE DEVICE Left 08/03/2020    Procedure: APPLICATION, WOUND VAC;  Surgeon: SEMAJ Márquez III, MD;  Location: [facility] OR [address] [place] [address];  Service: Peripheral Vascular;  Laterality: Left;    APPLICATION OF WOUND VACUUM-ASSISTED  CLOSURE DEVICE Left 08/06/2020    Procedure: APPLICATION, WOUND VAC;  Surgeon: SEMAJ Márquez III, MD;  Location: [facility] OR 2ND FLR;  Service: Peripheral Vascular;  Laterality: Left;    CARPAL TUNNEL RELEASE Right 06/10/2020    Procedure: RELEASE, CARPAL TUNNEL - RIGHT;  Surgeon: Adelaida Hall MD;  Location: [facility] OR;  Service: Orthopedics;  Laterality: Right;  GENERAL AND REGIONAL    CARPAL TUNNEL RELEASE Left 05/05/2021    Procedure: RELEASE, CARPAL TUNNEL, LEFT;  Surgeon: Adelaida Hall MD;  Location: [facility] OR;  Service: Orthopedics;  Laterality: Left;  GENERAL & REGIONAL IN PACU    CARPECTOMY Left 09/06/2023    Procedure: CARPECTOMY;  Surgeon: Adelaida Hall MD;  Location: [facility] OR;  Service: Orthopedics;  Laterality: Left;  MAC/REGIONAL  LEFT PROXIMAL ROW    CLOSURE OF WOUND Left 08/06/2020    Procedure: CLOSURE, WOUND;  Surgeon: SEMAJ Márquez III, MD;  Location: [facility] OR 2ND FLR;  Service: Peripheral Vascular;  Laterality: Left;  Complex    COLONOSCOPY N/A 08/17/2021    Procedure: COLONOSCOPY Suprep;  Surgeon: Loco Deluca MD;  Location: [facility];  Service: Endoscopy;  Laterality: N/A;    ECHOCARDIOGRAM,TRANSESOPHAGEAL N/A 07/24/2023    Procedure: Transesophageal echo (REENA) intra-procedure log documentation;  Surgeon: Leyla Diagnostic Provider;  Location: [facility] EP LAB;  Service: Cardiology;  Laterality: N/A;  s/p WM, REENA, anes, 3 Prep    ESOPHAGOGASTRODUODENOSCOPY N/A 08/17/2021    Procedure: EGD (ESOPHAGOGASTRODUODENOSCOPY);  Surgeon: Loco Deluca MD;  Location: [facility] ENDO;  Service: Endoscopy;  Laterality: N/A;  Patient is schedule to have her Covid test on 08/14/2021 at the [facility] @ 9:30am. [place].    EXPLORATION OF FEMORAL ARTERY Left 07/21/2020    Procedure: EXPLORATION, ARTERY, FEMORAL;  Surgeon: SEMAJ Márquez III, MD;  Location: [facility] OR 2ND FLR;  Service: Peripheral Vascular;  Laterality: Left;  1. Urgent Direct repair, L SFA branch laceration    FOOT  SURGERY      HYSTEROSCOPY WITH DILATION AND CURETTAGE OF UTERUS N/A 02/19/2022    Procedure: HYSTEROSCOPY, WITH DILATION AND CURETTAGE OF UTERUS;  Surgeon: Shane Plaomo MD;  [address]  Service: OB/GYN;  Laterality: N/A;    INCISION AND DRAINAGE OF KNEE Left 05/12/2022    Procedure: INCISION AND DRAINAGE, Prepatellar bursectomy.;  Surgeon: Dre Guzmán MD;  [address]  Service: Orthopedics;  Laterality: Left;    LEFT HEART CATHETERIZATION Left 02/07/2023    Procedure: Left heart cath;  Surgeon: Josiah Terry MD;  Location: [facility] CATH LAB;  Service: Cardiology;  Laterality: Left;  borderline moderate bleeding risk 6.3%    OCCLUSION OF LEFT ATRIAL APPENDAGE N/A 06/12/2023    Procedure: Left atrial appendage occlusion;  Surgeon: Gabriel Hawley MD;  Location: [facility] EP LAB;  Service: Cardiology;  Laterality: N/A;  afib, watchman, BSCI, demi, ALIYA ibarra, 3prep    RELEASE OF ULNAR NERVE AT CUBITAL TUNNEL Bilateral     RIGHT HEART CATHETERIZATION Right 08/05/2024    Procedure: INSERTION, CATHETER, RIGHT HEART;  Surgeon: Zane Liu MD;  Location: [facility] CATH LAB;  Service: Cardiology;  Laterality: Right;    ROBOT-ASSISTED LAPAROSCOPIC ABDOMINAL HYSTERECTOMY USING DA KEIRY XI N/A 08/09/2022    Procedure: XI ROBOTIC HYSTERECTOMY;  Surgeon: Yolanda Barriga MD;  Location: [facility];  Service: OB/GYN;  Laterality: N/A;  dual console requested    ROBOT-ASSISTED LAPAROSCOPIC SALPINGO-OOPHORECTOMY Bilateral 08/09/2022    Procedure: ROBOTIC SALPINGO-OOPHORECTOMY;  Surgeon: Yolanda Barriga MD;  Location: [facility];  Service: OB/GYN;  Laterality: Bilateral;    TRANSESOPHAGEAL ECHOCARDIOGRAPHY N/A 06/12/2023    Procedure: ECHOCARDIOGRAM, TRANSESOPHAGEAL;  Surgeon: Cyril Mast MD;  Location: [facility] EP LAB;  Service: Cardiology;  Laterality: N/A;    TREATMENT OF CARDIAC ARRHYTHMIA N/A 01/29/2020    Procedure: CARDIOVERSION;  Surgeon: Gabriel Hawley MD;  Location: [facility] EP LAB;   Service: Cardiology;  Laterality: N/A;  af, demi, dccv, anes, mb, 345    TREATMENT OF CARDIAC ARRHYTHMIA  01/24/2022    Procedure: Cardioversion or Defibrillation;  Surgeon: Gabriel Hawley MD;  Location: [facility] EP LAB;  Service: Cardiology;;    WOUND DEBRIDEMENT Left 08/06/2020    Procedure: DEBRIDEMENT, WOUND;  Surgeon: SEMAJ Márquez III, MD;  Location: [facility] OR [address] [place] [address];  Service: Peripheral Vascular;  Laterality: Left;       Home Meds:   Prior to Admission medications    Medication Sig Start Date End Date Taking? Authorizing Provider   albuterol (VENTOLIN HFA) 90 mcg/actuation inhaler Inhale 2 puffs into the lungs every 6 (six) hours as needed for Wheezing. Rescue 1/8/25 1/8/26  Gordy Cordero MD   ALPRAZolam (XANAX) 0.5 MG tablet Take 1 tablet (0.5 mg total) by mouth 2 (two) times daily as needed for Anxiety. 9/4/24   Gordy Cordero MD   b complex vitamins capsule Take 1 capsule by mouth every other day.    Provider, Historical   DULoxetine (CYMBALTA) 60 MG capsule Take 1 capsule (60 mg total) by mouth once daily. 8/12/24   Brennon Nunez MD   ferrous sulfate 325 (65 FE) MG EC tablet Take 1 tablet (325 mg total) by mouth every other day. 9/18/24   Eugene Woodward MD   furosemide (LASIX) 20 MG tablet Take 1 tablet (20 mg total) by mouth once daily. 9/19/24 9/19/25  Eugene Woodward MD   lactulose (CHRONULAC) 10 gram/15 mL solution Take 30 mLs (20 g total) by mouth 3 (three) times daily. Please ensure you are having at least 2-3 bowel movements every day. 1/14/25   Kiko Saba MD   metoprolol succinate (TOPROL-XL) 25 MG 24 hr tablet Take 3 tablets (75 mg total) by mouth 2 (two) times daily. 9/18/24 12/20/24  Eugene Woodward MD   midodrine (PROAMATINE) 5 MG Tab Take 3 tablets (15 mg total) by mouth 3 (three) times daily. 9/18/24 2/7/25  Eugene Woodward MD   multivitamin (THERAGRAN) per tablet Take 1 tablet by mouth once daily.    Provider, Historical   nystatin (MYCOSTATIN) powder Apply topically 2 (two) times  daily. 11/7/24   Gordy Cordero MD   omeprazole (PRILOSEC) 20 MG capsule TAKE 1 CAPSULE BY MOUTH ONCE DAILY 7/25/24   Gordy Cordero MD   ondansetron (ZOFRAN-ODT) 4 MG TbDL Take 2 tablets (8 mg total) by mouth every 8 (eight) hours as needed (nasuea, vomiting). 1/8/25   Gordy Cordero MD   oxyCODONE (ROXICODONE) 5 MG immediate release tablet Take 1 tablet (5 mg total) by mouth every 12 (twelve) hours as needed for Pain. 1/8/25   Gordy Cordero MD   spironolactone (ALDACTONE) 50 MG tablet Take 1 tablet (50 mg total) by mouth once daily. 11/12/24 6/10/25  Kiko Saba MD   medroxyPROGESTERone (PROVERA) 10 MG tablet Take 2 tablets (20 mg total) by mouth 3 (three) times daily. 7/13/22 8/9/22  Yolanda Barriga MD     Anticoagulants/Antiplatelets: no anticoagulation    Allergies:   Review of patient's allergies indicates:   Allergen Reactions    Flecainide Shortness Of Breath and Swelling    Shellfish containing products Other (See Comments)     Makes gout terrible    Vancomycin analogues Hives, Itching and Rash     Sedation History:  no adverse reactions    Review of Systems:   Hematological: no known coagulopathies  Respiratory: no shortness of breath  Cardiovascular: no chest pain  Gastrointestinal: no abdominal pain  Genito-Urinary: no dysuria  Musculoskeletal: negative  Neurological: no TIA or stroke symptoms         OBJECTIVE:     Vital Signs (Most Recent)  Pulse: 104 (01/24/25 0808)  Resp: 16 (01/24/25 0808)  BP: (!) 157/72 (01/24/25 0808)  SpO2: 100 % (01/24/25 0808)    Physical Exam:  ASA: 2  Mallampati: 2   Access: 20G PIV    General: no acute distress  Mental Status: alert and oriented to person, place and time  HEENT: normocephalic, atraumatic  Chest: unlabored breathing  Heart: regular heart rate  Abdomen: distended  Extremity: moves all extremities    ASSESSMENT/PLAN:     Sedation Plan: local  Patient will undergo: ultrasound guided paracentesis    Arash Lombardo  Diagnostic Radiology PGY-2

## 2025-01-24 NOTE — PROGRESS NOTES
CHW - Outreach Attempt    Esperanza Casey Community Health Worker left a voicemail message for 2nd attempt to contact patient regarding: [facility]   Community Health Worker to attempt to contact patient on: October 9, 2024.      See attached rx, last appt was 10/09/24

## 2025-01-24 NOTE — PROCEDURES
Pre Op Diagnosis: Ascites  Post Op Diagnosis: Same    Procedure: Paracentesis    Procedure performed by: Arash Lombardo MD    Written Informed Consent Obtained: Yes  Specimen Removed: YES 2900mL  Estimated Blood Loss: Minimal    Findings:   Successful US guided paracentesis    Patient tolerated procedure well.    Arash Lombardo  Diagnostic Radiology PGY-2

## 2025-01-28 ENCOUNTER — PATIENT MESSAGE (OUTPATIENT)
Dept: HEPATOLOGY | Facility: CLINIC | Age: 60
End: 2025-01-28
Payer: MEDICAID

## 2025-01-31 ENCOUNTER — HOSPITAL ENCOUNTER (OUTPATIENT)
Dept: INTERVENTIONAL RADIOLOGY/VASCULAR | Facility: HOSPITAL | Age: 60
Discharge: HOME | End: 2025-01-31
Attending: INTERNAL MEDICINE
Payer: MEDICAID

## 2025-01-31 VITALS
SYSTOLIC BLOOD PRESSURE: 127 MMHG | RESPIRATION RATE: 17 BRPM | HEART RATE: 51 BPM | DIASTOLIC BLOOD PRESSURE: 67 MMHG | OXYGEN SATURATION: 100 %

## 2025-01-31 DIAGNOSIS — R18.8 OTHER ASCITES: Chronic | ICD-10-CM

## 2025-01-31 DIAGNOSIS — R18.8 CIRRHOSIS OF LIVER WITH ASCITES, UNSPECIFIED HEPATIC CIRRHOSIS TYPE: Chronic | ICD-10-CM

## 2025-01-31 DIAGNOSIS — K74.60 CIRRHOSIS OF LIVER WITH ASCITES, UNSPECIFIED HEPATIC CIRRHOSIS TYPE: Chronic | ICD-10-CM

## 2025-01-31 PROCEDURE — 49083 ABD PARACENTESIS W/IMAGING: CPT | Mod: ,,, | Performed by: FAMILY MEDICINE

## 2025-01-31 PROCEDURE — 49083 ABD PARACENTESIS W/IMAGING: CPT | Performed by: STUDENT IN AN ORGANIZED HEALTH CARE EDUCATION/TRAINING PROGRAM

## 2025-01-31 PROCEDURE — 63600175 PHARM REV CODE 636 W HCPCS: Performed by: FAMILY MEDICINE

## 2025-01-31 PROCEDURE — C1729 CATH, DRAINAGE: HCPCS

## 2025-01-31 RX ORDER — LIDOCAINE HYDROCHLORIDE 10 MG/ML
INJECTION, SOLUTION INFILTRATION; PERINEURAL
Status: COMPLETED | OUTPATIENT
Start: 2025-01-31 | End: 2025-01-31

## 2025-01-31 RX ADMIN — LIDOCAINE HYDROCHLORIDE 10 ML: 10 INJECTION, SOLUTION INFILTRATION; PERINEURAL at 08:01

## 2025-01-31 NOTE — Clinical Note
Left: Abdomen.   Scrubbed with ChloroPrep With Tint.    Hair: N/A.  Skin prep dry before draping.  Prepped by: Samantha Power NP 1/31/2025 8:42 AM.

## 2025-01-31 NOTE — PLAN OF CARE
Paracentesis completed, pt tolerated well. No s/s of complications noted. 3400 ml removed from LLQ, mepore applied CDI. No albumin given per protocol.  Discharge instructions reviewed and acknowledged. Pt discharged via wheelchair.

## 2025-01-31 NOTE — DISCHARGE INSTRUCTIONS
Interventional Radiology Clinic    [phone], choose prompt 3, Monday - Friday, 8:00 am - 4:00 pm    [phone] After hours and on holidays. Ask to speak with the interventional radiologist on call.

## 2025-01-31 NOTE — H&P
Radiology History & Physical      SUBJECTIVE:     Chief Complaint: abdominal distention    History of Present Illness:  Vicky Alvarado is a 59 y.o. female who presents for ultrasound guided paracentesis  Past Medical History:   Diagnosis Date    Anticoagulant long-term use     Arthritis     Atrial fibrillation     cardioversion    Atrial fibrillation with RVR     HAS WATCHMAN IN PLACE NOW    Avascular necrosis     L hand    Cellulitis of extremity 05/07/2022    CHF (congestive heart failure)     Chronic fatigue     Cirrhosis of liver with ascites     Depression     Diabetes mellitus     Encounter for blood transfusion 07/22/2020    Encounter for blood transfusion 03/2022    Fatty liver     GERD (gastroesophageal reflux disease)     Hx of psychiatric care     Hypertension     Iron deficiency anemia 05/07/2022    TIBC 444 with saturated iron 8    Left leg cellulitis 05/07/2022    Liver disease     Obese     Pre-diabetes 05/07/2022    Psychiatric problem     Sleep apnea     wears cpap    SOB (shortness of breath) on exertion     Weight loss     75lb intentional weight loss     Past Surgical History:   Procedure Laterality Date    ABLATION OF ARRHYTHMOGENIC FOCUS FOR ATRIAL FIBRILLATION N/A 07/20/2020    Procedure: Ablation atrial fibrillation;  Surgeon: Gabriel Hawley MD;  Location: [facility] EP LAB;  Service: Cardiology;  Laterality: N/A;  afib, PVI, RFA, REENA, SHADY, anes, MB, 3 Prep    ABLATION OF ARRHYTHMOGENIC FOCUS FOR ATRIAL FIBRILLATION N/A 01/24/2022    Procedure: Ablation atrial fibrillation;  Surgeon: Gabriel Hawley MD;  Location: [facility] EP LAB;  Service: Cardiology;  Laterality: N/A;  afib/afl, PVI (re-do)/CTI, RFA, REENA (cx if SR), SHADY, anes, MB, 3 Prep    APPLICATION OF WOUND VACUUM-ASSISTED CLOSURE DEVICE Left 08/03/2020    Procedure: APPLICATION, WOUND VAC;  Surgeon: SEMAJ Márquez III, MD;  Location: [facility] OR [address] [place] [address];  Service: Peripheral Vascular;  Laterality: Left;    APPLICATION OF WOUND  VACUUM-ASSISTED CLOSURE DEVICE Left 08/06/2020    Procedure: APPLICATION, WOUND VAC;  Surgeon: SEMAJ Márquez III, MD;  Location: [facility] OR 2ND FLR;  Service: Peripheral Vascular;  Laterality: Left;    CARPAL TUNNEL RELEASE Right 06/10/2020    Procedure: RELEASE, CARPAL TUNNEL - RIGHT;  Surgeon: Adelaida Hall MD;  Location: [facility] OR;  Service: Orthopedics;  Laterality: Right;  GENERAL AND REGIONAL    CARPAL TUNNEL RELEASE Left 05/05/2021    Procedure: RELEASE, CARPAL TUNNEL, LEFT;  Surgeon: Adelaida Hall MD;  Location: [facility] OR;  Service: Orthopedics;  Laterality: Left;  GENERAL & REGIONAL IN PACU    CARPECTOMY Left 09/06/2023    Procedure: CARPECTOMY;  Surgeon: Adelaida Hall MD;  Location: [facility] OR;  Service: Orthopedics;  Laterality: Left;  MAC/REGIONAL  LEFT PROXIMAL ROW    CLOSURE OF WOUND Left 08/06/2020    Procedure: CLOSURE, WOUND;  Surgeon: SEMAJ Márquez III, MD;  Location: [facility] OR 2ND FLR;  Service: Peripheral Vascular;  Laterality: Left;  Complex    COLONOSCOPY N/A 08/17/2021    Procedure: COLONOSCOPY Suprep;  Surgeon: Loco Deluca MD;  Location: [facility];  Service: Endoscopy;  Laterality: N/A;    ECHOCARDIOGRAM,TRANSESOPHAGEAL N/A 07/24/2023    Procedure: Transesophageal echo (REENA) intra-procedure log documentation;  Surgeon: Leyla Diagnostic Provider;  Location: [facility] EP LAB;  Service: Cardiology;  Laterality: N/A;  s/p WM, REENA, anes, 3 Prep    ESOPHAGOGASTRODUODENOSCOPY N/A 08/17/2021    Procedure: EGD (ESOPHAGOGASTRODUODENOSCOPY);  Surgeon: Loco Deluca MD;  Location: [facility] ENDO;  Service: Endoscopy;  Laterality: N/A;  Patient is schedule to have her Covid test on 08/14/2021 at the [facility] @ 9:30am. [place].    EXPLORATION OF FEMORAL ARTERY Left 07/21/2020    Procedure: EXPLORATION, ARTERY, FEMORAL;  Surgeon: SEMAJ Márquez III, MD;  Location: [facility] OR 2ND FLR;  Service: Peripheral Vascular;  Laterality: Left;  1. Urgent Direct repair, L SFA branch  laceration    FOOT SURGERY      HYSTEROSCOPY WITH DILATION AND CURETTAGE OF UTERUS N/A 02/19/2022    Procedure: HYSTEROSCOPY, WITH DILATION AND CURETTAGE OF UTERUS;  Surgeon: Shane Palomo MD;  Location: [address];  Service: OB/GYN;  Laterality: N/A;    INCISION AND DRAINAGE OF KNEE Left 05/12/2022    Procedure: INCISION AND DRAINAGE, Prepatellar bursectomy.;  Surgeon: Dre Guzmán MD;  [address];  Service: Orthopedics;  Laterality: Left;    LEFT HEART CATHETERIZATION Left 02/07/2023    Procedure: Left heart cath;  Surgeon: Josiah Terry MD;  Location: [facility] CATH LAB;  Service: Cardiology;  Laterality: Left;  borderline moderate bleeding risk 6.3%    OCCLUSION OF LEFT ATRIAL APPENDAGE N/A 06/12/2023    Procedure: Left atrial appendage occlusion;  Surgeon: Gabriel Hawley MD;  Location: [facility] EP LAB;  Service: Cardiology;  Laterality: N/A;  afib, watchman, BSCI, demi, ALIYA ibarra, 3prep    RELEASE OF ULNAR NERVE AT CUBITAL TUNNEL Bilateral     RIGHT HEART CATHETERIZATION Right 08/05/2024    Procedure: INSERTION, CATHETER, RIGHT HEART;  Surgeon: Zane Liu MD;  Location: [facility] CATH LAB;  Service: Cardiology;  Laterality: Right;    ROBOT-ASSISTED LAPAROSCOPIC ABDOMINAL HYSTERECTOMY USING DA KEIRY XI N/A 08/09/2022    Procedure: XI ROBOTIC HYSTERECTOMY;  Surgeon: Yolanda Barriga MD;  Location: [facility];  Service: OB/GYN;  Laterality: N/A;  dual console requested    ROBOT-ASSISTED LAPAROSCOPIC SALPINGO-OOPHORECTOMY Bilateral 08/09/2022    Procedure: ROBOTIC SALPINGO-OOPHORECTOMY;  Surgeon: Yolanda Barriga MD;  Location: [facility];  Service: OB/GYN;  Laterality: Bilateral;    TRANSESOPHAGEAL ECHOCARDIOGRAPHY N/A 06/12/2023    Procedure: ECHOCARDIOGRAM, TRANSESOPHAGEAL;  Surgeon: Cyril Mast MD;  Location: [facility] EP LAB;  Service: Cardiology;  Laterality: N/A;    TREATMENT OF CARDIAC ARRHYTHMIA N/A 01/29/2020    Procedure: CARDIOVERSION;  Surgeon: Gabriel Hawley MD;  Location:  [facility] EP LAB;  Service: Cardiology;  Laterality: N/A;  af, demi, dccv, anes, mb, 345    TREATMENT OF CARDIAC ARRHYTHMIA  01/24/2022    Procedure: Cardioversion or Defibrillation;  Surgeon: Gabriel Hawley MD;  Location: [facility] EP LAB;  Service: Cardiology;;    WOUND DEBRIDEMENT Left 08/06/2020    Procedure: DEBRIDEMENT, WOUND;  Surgeon: SEMAJ Márquez III, MD;  Location: [facility] OR [address] [place] [address];  Service: Peripheral Vascular;  Laterality: Left;       Home Meds:   Prior to Admission medications    Medication Sig Start Date End Date Taking? Authorizing Provider   albuterol (VENTOLIN HFA) 90 mcg/actuation inhaler Inhale 2 puffs into the lungs every 6 (six) hours as needed for Wheezing. Rescue 1/8/25 1/8/26  Gordy Cordero MD   ALPRAZolam (XANAX) 0.5 MG tablet Take 1 tablet (0.5 mg total) by mouth 2 (two) times daily as needed for Anxiety. 9/4/24   Gordy Cordero MD   b complex vitamins capsule Take 1 capsule by mouth every other day.    Provider, Historical   DULoxetine (CYMBALTA) 60 MG capsule Take 1 capsule (60 mg total) by mouth once daily. 8/12/24   Brennon Nunez MD   ferrous sulfate 325 (65 FE) MG EC tablet Take 1 tablet (325 mg total) by mouth every other day. 9/18/24   Eugene Woodward MD   furosemide (LASIX) 20 MG tablet Take 1 tablet (20 mg total) by mouth once daily. 9/19/24 9/19/25  Eugene Woodward MD   lactulose (CHRONULAC) 10 gram/15 mL solution Take 30 mLs (20 g total) by mouth 3 (three) times daily. Please ensure you are having at least 2-3 bowel movements every day. 1/14/25   Kiko Saba MD   metoprolol succinate (TOPROL-XL) 25 MG 24 hr tablet Take 3 tablets (75 mg total) by mouth 2 (two) times daily. 9/18/24 12/20/24  Eugene Woodward MD   midodrine (PROAMATINE) 5 MG Tab Take 3 tablets (15 mg total) by mouth 3 (three) times daily. 9/18/24 2/7/25  Eugene Woodward MD   multivitamin (THERAGRAN) per tablet Take 1 tablet by mouth once daily.    Provider, Historical   nystatin (MYCOSTATIN) powder Apply topically 2  (two) times daily. 11/7/24   Gordy Cordero MD   omeprazole (PRILOSEC) 20 MG capsule TAKE 1 CAPSULE BY MOUTH ONCE DAILY 7/25/24   Gordy Cordero MD   ondansetron (ZOFRAN-ODT) 4 MG TbDL Take 2 tablets (8 mg total) by mouth every 8 (eight) hours as needed (nasuea, vomiting). 1/8/25   Gordy Cordero MD   oxyCODONE (ROXICODONE) 5 MG immediate release tablet Take 1 tablet (5 mg total) by mouth every 12 (twelve) hours as needed for Pain. 1/8/25   Gordy Cordero MD   spironolactone (ALDACTONE) 50 MG tablet Take 1 tablet (50 mg total) by mouth once daily. 11/12/24 6/10/25  Kiko Saba MD   medroxyPROGESTERone (PROVERA) 10 MG tablet Take 2 tablets (20 mg total) by mouth 3 (three) times daily. 7/13/22 8/9/22  Yolanda Barriga MD     Anticoagulants/Antiplatelets: no anticoagulation    Allergies:   Review of patient's allergies indicates:   Allergen Reactions    Flecainide Shortness Of Breath and Swelling    Shellfish containing products Other (See Comments)     Makes gout terrible    Vancomycin analogues Hives, Itching and Rash     Sedation History:  no adverse reactions    Review of Systems:   Hematological: no known coagulopathies  Respiratory: no shortness of breath  Cardiovascular: no chest pain  Gastrointestinal: no abdominal pain  Genito-Urinary: no dysuria  Musculoskeletal: negative  Neurological: no TIA or stroke symptoms         OBJECTIVE:     Vital Signs (Most Recent)  Pulse: (!) 53 (01/31/25 0827)  Resp: 16 (01/31/25 0827)  BP: (!) 145/75 (01/31/25 0827)  SpO2: 100 % (01/31/25 0827)    Physical Exam:  ASA: 2  Mallampati: n/a    General: no acute distress  Mental Status: alert and oriented to person, place and time  HEENT: normocephalic, atraumatic  Chest: unlabored breathing  Heart: regular heart rate  Abdomen: distended  Extremity: moves all extremities    ASSESSMENT/PLAN:     Sedation Plan: local  Patient will undergo ultrasound guided paracentesis.    SCOUT Wolfe, FNP  Interventional  Radiology  [phone] clinic

## 2025-01-31 NOTE — PROCEDURES
Radiology Post-Procedure Note    Pre Op Diagnosis: Ascites  Post Op Diagnosis: Same    Procedure: Ultrasound Guided Paracentesis    Procedure performed by: Jannet CUNNINGHAM, Samantha     Written Informed Consent Obtained: Yes  Specimen Removed: YES serous  Estimated Blood Loss: Minimal    Findings:   Successful LLQ paracentesis.  Albumin administered PRN per protocol.    Patient tolerated procedure well.    Samantha Power, APRN, FNP  Interventional Radiology  [phone] clinic

## 2025-02-04 NOTE — PROGRESS NOTES
Ms. Alvarado is a patient of Dr. Hawley and was last seen in clinic 1/17/2024.      Subjective:   Patient ID:  Vicky Alvarado is a 59 y.o. female who presents for follow up of Atrial Fibrillation  .     HPI:    Ms. Alvarado is a 59 y.o. female with HTN, AF (PVI 7/2020, 1/2022), GIB, watchman, cirrhosis here for follow up.    Background:    7/7/2020: 1/9/20, she had her upper teeth pulled for dentures and been on penicillin since that time. She was in SR. She takes atenolol 10 mg q.d. for hypertension. She underwent REENA/CV 1/29/2020. EF 40% in AF. PET Stress 3/2020 revealed no ischemia. Repeat echo in NSR showed EF 60%. She has had recurrence, once during carpal tunnel surgery, and several other times since.  Regarding her family history, her mother and brother both have atrial fibrillation. She is on elquis for CVA prophylaxis. CHADSVASC of at least 3. No bleeding issues with xarelto.      10/2020: PVI 7/20/2020. 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 She underwent emergent exploratory surgery by Dr Rose who ligated a branch of her left SFA. She had further wound issues requiring wash out and wound vac over the next few months. This has been a challenging recovery but she is nearing completion. No symptomatic or documented AF recurrence.     3/21: AF/RVR recurrence with ER visit. Flecainide 100 mg bid started 3/1/21. No recurrence since. Some PVCs    6/21: Her leg incision has healed. She has resumed work. Over the past month, she has developed more NAPIER.     9/21/21: HTN meds adjusted. Lasix started, hctz stopped, nifedipine started. Labs showed anemia. Endoscopy with no lesions. Respiratory evaluation performed without obvious culprit. LE edema worsened. She stopped flecainide with resolution of symptoms.  She has had some palpitations.     12/21: rythmol started for pAF. She feels that her AF has returned recently with some long sustained events. In SR today. She feels  that the AF is compromising her QOL and asks about repeat ablation.     4/22: Repeat ablation 1/22 with rare and brief, longest 1h. She has since developed  bleeding and is due for hysterectomy later this spring. Symptoms much improved.     10/2022: Pt stopped rhythmol but restarted 12/2022 due to recurrent AF symptoms.    4/11/2023: She had NAPIER leading to PET stress and then LHC. LHC with only distal, small vessel disease. EF normal 10/22. She has FE deficient anemia.   CHADSVASC of 4  HASBLED of 3  57 yoF here for AF management. She has no sustained arrhythmias since her last ablation. She has developed fatigue and Fe deficient anemia. I offered LAAO due to elevated bleeding and stroke risks. The patient can tolerate short term OAC but is not a candidate for long term OAC due to recurrent bleeding issues. I discussed management options regarding LAAO. I discussed the process of LAAO via Watchman including pre-procedure testing  as well as the need to take OAC for 6 weeks post implant. We discussed post procedure REENA studies to assess EMILIANO occlusion. Additionally I mentioned the need to take DAPT up to the 6 month point post implant.     2/2023: LHC:    The left main was normal  The LAD had luminal irregularity without obstructive disease  The proximal circumflex and large OMB were normal.  The distal circumflex supplying the distal OMB had 70-80% stenosis. This was a small to medium-sized vessel.  The RCA had luminal irregularity distally with a 50% stenosis before the PDA.    6/12/2023: The left atrial appendage closure was successful  with a DEVICE WATCHMAN FLX CLSR 27MM device.    7/24/2023: Small 2mm peridevice leak seen. Eliquis stopped. Patient discharged on DAPT.    9/19/2023: She is 3 moths s/p watchman implantation. REENA 7/24/2023 showed 2mm peridevice leak and eliquis was stopped and DAPT (ASA and plavix) started. She is doing well from a rhythm standpoint, with no recent sustained palpitations of  propafenone. Unfortunately, she has developed RBBB. She also has LHC confirmed CAD (see cath report 2/2023). Case previously discussed with Dr. Hawley. Will stop propafenone  - defer restarting AAD. If AF recurs, will update ECG off propafenone to determine most appropriate AAD therapy. Patient to continue DAPT therapy until 6 months post-implant.     1/17/2024: At last clinic visit, propafenone was stopped due to RBBB. No sustained palpitations since stopping AAD but does continue to experience irrg HB c/w frequent PACs. Will increase metoprolol. If symptoms persist, can switch nifedipine to diltiazem. If AAD needed, consider multaq due to persistent RBBB. She is 6 mo on DAPT s/p watchman- ok  to stop plavix at this time. Continue ASA indefinitely.     Update (02/05/2025):    7/26/2024 pt hospitalized with decompensated cirrhosis secondary to metabolic dysfunction associated steatotic liver disease.  Liver biopsy suggest hepatic congestion. RHC showing borderline low CI/CO and normal biventricular filling pressures.    RHC 8/5/2024:  Normal biventricular filling pressures  Borderline low CI/CO  Mild pre capillary pulmonary hypertension  No evidence of left to right intracardiac shunt    Today she reports chronic fatigue although her symptoms are improving since initial hospitalization. Chronic ascites and undergoes weekly paracentesis. On lasix and lactulose. Her last upper endoscopy showed no varices. Generally stable. Underwent PT to regain strength and using walker. Not very active  - stuck at home. No falls in months.   Some balance issues and room spinning dizziness - had been on midodrine but was weaned off ~ 3 mo ago due to increased BPs.   Says her palpitations are increased and she is back on toprol 100mg BID (had been on 75mg BID when her BPs were low).     On ASA s/p watchman. On toprol 100mg BID.   I have personally reviewed the patient's EKG today, which shows sinus rhythm with RBBB at 61bpm. CA  interval is 158. QRS is 138. QTc is 450.    Relevant Cardiac Test Results:    2D Echo (9/13/2024):    Left Ventricle: The left ventricle is normal in size. Normal wall thickness. Normal wall motion. There is normal systolic function with a visually estimated ejection fraction of 60 - 65%. There is indeterminate diastolic function.    Right Ventricle: Moderate right ventricular enlargement. Wall thickness is normal. Systolic function is normal.    The left atrium is severely dilated.    Left Atrium: The pulmonary veins have systolic blunting.    Mitral Valve: There is mild regurgitation.    Pulmonary Artery: There is mild pulmonary hypertension. The estimated pulmonary artery systolic pressure is 40 mmHg.    IVC/SVC: Normal venous pressure at 3 mmHg.    Ascites is present.    Current Outpatient Medications   Medication Sig    albuterol (VENTOLIN HFA) 90 mcg/actuation inhaler Inhale 2 puffs into the lungs every 6 (six) hours as needed for Wheezing. Rescue    ALPRAZolam (XANAX) 0.5 MG tablet Take 1 tablet (0.5 mg total) by mouth 2 (two) times daily as needed for Anxiety.    b complex vitamins capsule Take 1 capsule by mouth every other day.    DULoxetine (CYMBALTA) 60 MG capsule Take 1 capsule (60 mg total) by mouth once daily.    ferrous sulfate 325 (65 FE) MG EC tablet Take 1 tablet (325 mg total) by mouth every other day.    furosemide (LASIX) 20 MG tablet Take 1 tablet (20 mg total) by mouth once daily.    ibuprofen (ADVIL,MOTRIN) 600 MG tablet Take 1 tablet (600 mg total) by mouth 2 (two) times daily as needed for Pain.    lactulose (CHRONULAC) 10 gram/15 mL solution Take 30 mLs (20 g total) by mouth 3 (three) times daily. Please ensure you are having at least 2-3 bowel movements every day.    metoprolol succinate (TOPROL-XL) 25 MG 24 hr tablet Take 3 tablets (75 mg total) by mouth 2 (two) times daily.    midodrine (PROAMATINE) 5 MG Tab Take 3 tablets (15 mg total) by mouth 3 (three) times daily.    multivitamin  "(THERAGRAN) per tablet Take 1 tablet by mouth once daily.    nystatin (MYCOSTATIN) powder Apply topically 2 (two) times daily.    omeprazole (PRILOSEC) 20 MG capsule TAKE 1 CAPSULE BY MOUTH ONCE DAILY    ondansetron (ZOFRAN-ODT) 4 MG TbDL Take 2 tablets (8 mg total) by mouth every 8 (eight) hours as needed (nasuea, vomiting).    oxyCODONE (ROXICODONE) 5 MG immediate release tablet Take 1 tablet (5 mg total) by mouth every 12 (twelve) hours as needed for Pain.    spironolactone (ALDACTONE) 50 MG tablet Take 1 tablet (50 mg total) by mouth once daily.     No current facility-administered medications for this visit.       Review of Systems   Constitutional: Positive for malaise/fatigue.   Cardiovascular:  Positive for dyspnea on exertion, irregular heartbeat and palpitations. Negative for chest pain and leg swelling.   Respiratory:  Negative for shortness of breath.    Hematologic/Lymphatic: Negative for bleeding problem.   Skin:  Negative for rash.   Musculoskeletal:  Negative for myalgias.   Gastrointestinal:  Positive for bloating. Negative for hematemesis, hematochezia and nausea.   Genitourinary:  Negative for hematuria.   Neurological:  Positive for light-headedness, loss of balance and vertigo.   Psychiatric/Behavioral:  Negative for altered mental status.    Allergic/Immunologic: Negative for persistent infections.       Objective:          /72 (Patient Position: Sitting)   Pulse 61   Ht 5' 3" (1.6 m)   Wt 92.2 kg (203 lb 4.2 oz)   LMP 03/25/2022 (Exact Date)   BMI 36.01 kg/m²     Physical Exam  Vitals and nursing note reviewed.   Constitutional:       Appearance: Normal appearance. She is well-developed.   HENT:      Head: Normocephalic.      Nose: Nose normal.   Eyes:      Pupils: Pupils are equal, round, and reactive to light.   Cardiovascular:      Rate and Rhythm: Normal rate and regular rhythm.   Pulmonary:      Effort: No respiratory distress.   Musculoskeletal:         General: Normal range " of motion.   Skin:     General: Skin is warm and dry.      Findings: No erythema.   Neurological:      Mental Status: She is alert and oriented to person, place, and time.   Psychiatric:         Speech: Speech normal.         Behavior: Behavior normal.           Lab Results   Component Value Date     01/31/2025    K 4.7 01/31/2025    MG 1.7 09/18/2024    BUN 32 (H) 01/31/2025    CREATININE 1.3 01/31/2025    ALT 12 01/31/2025    AST 25 01/31/2025    HGB 11.6 (L) 01/31/2025    HCT 37.6 01/31/2025    HCT 27 (L) 09/10/2024    TSH 1.708 10/10/2022    LDLCALC 50.2 (L) 12/20/2023       Recent Labs   Lab 10/30/24  0909 11/06/24  0914 12/18/24  0725 01/31/25  0704   INR 0.9 1.0 0.9 1.0       Assessment:     1. Atrial Fibrillation (paroxysmal)    2. Essential hypertension    3. ERENDIRA (obstructive sleep apnea)    4. Presence of Watchman left atrial appendage closure device        Plan:     In summary, Ms. Alvarado is a 59 y.o. female with HTN, AF (PVI 7/2020, 1/2022), GIB, watchman, cirrhosis here for follow up.  Pt last seen in clinic 1/17/2024. Since that visit she was hospitalized with decompensated cirrhosis secondary to metabolic dysfunction-associated steatotic liver disease. Follows with hepatology and undergoes weekly paracentesis.  Reporting worsened palpitations and increased toprol back to 100mg bid. Will update monitor to evaluate. No longer on propafenone due to RBBB. She is on ASA s/p watchman.    Monitor  Continue current meds for now  RTC depending on testing    *A copy of this note has been sent to Dr. Hawley*    Follow up after testing.      ------------------------------------------------------------------    SCOUT Payton, NP-C  Cardiac Electrophysiology

## 2025-02-05 ENCOUNTER — OFFICE VISIT (OUTPATIENT)
Dept: ELECTROPHYSIOLOGY | Facility: CLINIC | Age: 60
End: 2025-02-05
Payer: MEDICAID

## 2025-02-05 ENCOUNTER — HOSPITAL ENCOUNTER (OUTPATIENT)
Dept: CARDIOLOGY | Facility: CLINIC | Age: 60
Discharge: HOME | End: 2025-02-05
Payer: MEDICAID

## 2025-02-05 VITALS
DIASTOLIC BLOOD PRESSURE: 72 MMHG | HEART RATE: 61 BPM | WEIGHT: 203.25 LBS | SYSTOLIC BLOOD PRESSURE: 122 MMHG | BODY MASS INDEX: 36.01 KG/M2 | HEIGHT: 63 IN

## 2025-02-05 DIAGNOSIS — I10 ESSENTIAL HYPERTENSION: Chronic | ICD-10-CM

## 2025-02-05 DIAGNOSIS — I48.0 PAF (PAROXYSMAL ATRIAL FIBRILLATION): Primary | Chronic | ICD-10-CM

## 2025-02-05 DIAGNOSIS — Z95.818 PRESENCE OF WATCHMAN LEFT ATRIAL APPENDAGE CLOSURE DEVICE: ICD-10-CM

## 2025-02-05 DIAGNOSIS — G47.33 OSA (OBSTRUCTIVE SLEEP APNEA): Chronic | ICD-10-CM

## 2025-02-05 DIAGNOSIS — I48.0 PAROXYSMAL ATRIAL FIBRILLATION: ICD-10-CM

## 2025-02-05 LAB
OHS QRS DURATION: 138 MS
OHS QTC CALCULATION: 450 MS

## 2025-02-05 PROCEDURE — 3078F DIAST BP <80 MM HG: CPT | Mod: CPTII,,, | Performed by: NURSE PRACTITIONER

## 2025-02-05 PROCEDURE — 1160F RVW MEDS BY RX/DR IN RCRD: CPT | Mod: CPTII,,, | Performed by: NURSE PRACTITIONER

## 2025-02-05 PROCEDURE — 3008F BODY MASS INDEX DOCD: CPT | Mod: CPTII,,, | Performed by: NURSE PRACTITIONER

## 2025-02-05 PROCEDURE — 99214 OFFICE O/P EST MOD 30 MIN: CPT | Mod: S$PBB,,, | Performed by: NURSE PRACTITIONER

## 2025-02-05 PROCEDURE — 99999 PR PBB SHADOW E&M-EST. PATIENT-LVL IV: CPT | Mod: PBBFAC,,, | Performed by: NURSE PRACTITIONER

## 2025-02-05 PROCEDURE — 93010 ELECTROCARDIOGRAM REPORT: CPT | Mod: S$PBB,,, | Performed by: INTERNAL MEDICINE

## 2025-02-05 PROCEDURE — 3074F SYST BP LT 130 MM HG: CPT | Mod: CPTII,,, | Performed by: NURSE PRACTITIONER

## 2025-02-05 PROCEDURE — 99214 OFFICE O/P EST MOD 30 MIN: CPT | Mod: PBBFAC,25 | Performed by: NURSE PRACTITIONER

## 2025-02-05 PROCEDURE — 93005 ELECTROCARDIOGRAM TRACING: CPT | Mod: PBBFAC | Performed by: INTERNAL MEDICINE

## 2025-02-05 PROCEDURE — 1159F MED LIST DOCD IN RCRD: CPT | Mod: CPTII,,, | Performed by: NURSE PRACTITIONER

## 2025-02-05 RX ORDER — METOPROLOL SUCCINATE 100 MG/1
100 TABLET, EXTENDED RELEASE ORAL 2 TIMES DAILY
Qty: 180 TABLET | Refills: 3 | Status: SHIPPED | OUTPATIENT
Start: 2025-02-05 | End: 2026-02-05

## 2025-02-07 ENCOUNTER — CLINICAL SUPPORT (OUTPATIENT)
Dept: CARDIOLOGY | Facility: HOSPITAL | Age: 60
DRG: 392 | End: 2025-02-07
Attending: NURSE PRACTITIONER
Payer: MEDICAID

## 2025-02-07 ENCOUNTER — HOSPITAL ENCOUNTER (OUTPATIENT)
Dept: INTERVENTIONAL RADIOLOGY/VASCULAR | Facility: HOSPITAL | Age: 60
Discharge: HOME | DRG: 392 | End: 2025-02-07
Attending: INTERNAL MEDICINE
Payer: MEDICAID

## 2025-02-07 VITALS
DIASTOLIC BLOOD PRESSURE: 61 MMHG | HEART RATE: 59 BPM | SYSTOLIC BLOOD PRESSURE: 106 MMHG | OXYGEN SATURATION: 94 % | RESPIRATION RATE: 19 BRPM

## 2025-02-07 DIAGNOSIS — K74.60 CIRRHOSIS OF LIVER WITH ASCITES, UNSPECIFIED HEPATIC CIRRHOSIS TYPE: Chronic | ICD-10-CM

## 2025-02-07 DIAGNOSIS — R18.8 OTHER ASCITES: Chronic | ICD-10-CM

## 2025-02-07 DIAGNOSIS — I48.0 PAF (PAROXYSMAL ATRIAL FIBRILLATION): Chronic | ICD-10-CM

## 2025-02-07 DIAGNOSIS — R18.8 CIRRHOSIS OF LIVER WITH ASCITES, UNSPECIFIED HEPATIC CIRRHOSIS TYPE: Chronic | ICD-10-CM

## 2025-02-07 PROCEDURE — 49083 ABD PARACENTESIS W/IMAGING: CPT | Performed by: RADIOLOGY

## 2025-02-07 PROCEDURE — 0W9G3ZZ DRAINAGE OF PERITONEAL CAVITY, PERCUTANEOUS APPROACH: ICD-10-PCS | Performed by: INTERNAL MEDICINE

## 2025-02-07 PROCEDURE — 49083 ABD PARACENTESIS W/IMAGING: CPT | Mod: ,,,

## 2025-02-07 NOTE — H&P
Radiology History & Physical      SUBJECTIVE:     Chief Complaint: Abdominal Distension    History of Present Illness:  Vicky Alvarado is a 59 y.o. female who presents for US Guided Paracentesis.     Past Medical History:   Diagnosis Date    Anticoagulant long-term use     Arthritis     Atrial fibrillation     cardioversion    Atrial fibrillation with RVR     HAS WATCHMAN IN PLACE NOW    Avascular necrosis     L hand    Cellulitis of extremity 05/07/2022    CHF (congestive heart failure)     Chronic fatigue     Cirrhosis of liver with ascites     Depression     Diabetes mellitus     Encounter for blood transfusion 07/22/2020    Encounter for blood transfusion 03/2022    Fatty liver     GERD (gastroesophageal reflux disease)     Hx of psychiatric care     Hypertension     Iron deficiency anemia 05/07/2022    TIBC 444 with saturated iron 8    Left leg cellulitis 05/07/2022    Liver disease     Obese     Pre-diabetes 05/07/2022    Psychiatric problem     Sleep apnea     wears cpap    SOB (shortness of breath) on exertion     Weight loss     75lb intentional weight loss     Past Surgical History:   Procedure Laterality Date    ABLATION OF ARRHYTHMOGENIC FOCUS FOR ATRIAL FIBRILLATION N/A 07/20/2020    Procedure: Ablation atrial fibrillation;  Surgeon: Gabriel Hawley MD;  Location: [facility] EP LAB;  Service: Cardiology;  Laterality: N/A;  afib, PVI, RFA, REENA, SHADY, anes, MB, 3 Prep    ABLATION OF ARRHYTHMOGENIC FOCUS FOR ATRIAL FIBRILLATION N/A 01/24/2022    Procedure: Ablation atrial fibrillation;  Surgeon: Gabriel Hawley MD;  Location: [facility] EP LAB;  Service: Cardiology;  Laterality: N/A;  afib/afl, PVI (re-do)/CTI, RFA, REENA (cx if SR), SHADY, anes, MB, 3 Prep    APPLICATION OF WOUND VACUUM-ASSISTED CLOSURE DEVICE Left 08/03/2020    Procedure: APPLICATION, WOUND VAC;  Surgeon: SEMAJ Márquez III, MD;  Location: [facility] OR [address] [place] [address];  Service: Peripheral Vascular;  Laterality: Left;    APPLICATION OF WOUND  VACUUM-ASSISTED CLOSURE DEVICE Left 08/06/2020    Procedure: APPLICATION, WOUND VAC;  Surgeon: SEMAJ Márquez III, MD;  Location: [facility] OR 2ND FLR;  Service: Peripheral Vascular;  Laterality: Left;    CARPAL TUNNEL RELEASE Right 06/10/2020    Procedure: RELEASE, CARPAL TUNNEL - RIGHT;  Surgeon: Adelaida Hall MD;  Location: [facility] OR;  Service: Orthopedics;  Laterality: Right;  GENERAL AND REGIONAL    CARPAL TUNNEL RELEASE Left 05/05/2021    Procedure: RELEASE, CARPAL TUNNEL, LEFT;  Surgeon: Adeliada Hall MD;  Location: [facility] OR;  Service: Orthopedics;  Laterality: Left;  GENERAL & REGIONAL IN PACU    CARPECTOMY Left 09/06/2023    Procedure: CARPECTOMY;  Surgeon: Adelaida Hall MD;  Location: [facility] OR;  Service: Orthopedics;  Laterality: Left;  MAC/REGIONAL  LEFT PROXIMAL ROW    CLOSURE OF WOUND Left 08/06/2020    Procedure: CLOSURE, WOUND;  Surgeon: SEMAJ Márquez III, MD;  Location: [facility] OR 2ND FLR;  Service: Peripheral Vascular;  Laterality: Left;  Complex    COLONOSCOPY N/A 08/17/2021    Procedure: COLONOSCOPY Suprep;  Surgeon: Loco Deluca MD;  Location: [facility];  Service: Endoscopy;  Laterality: N/A;    ECHOCARDIOGRAM,TRANSESOPHAGEAL N/A 07/24/2023    Procedure: Transesophageal echo (REENA) intra-procedure log documentation;  Surgeon: Leyla Diagnostic Provider;  Location: [facility] EP LAB;  Service: Cardiology;  Laterality: N/A;  s/p WM, REENA, anes, 3 Prep    ESOPHAGOGASTRODUODENOSCOPY N/A 08/17/2021    Procedure: EGD (ESOPHAGOGASTRODUODENOSCOPY);  Surgeon: Loco Deluca MD;  Location: [facility] ENDO;  Service: Endoscopy;  Laterality: N/A;  Patient is schedule to have her Covid test on 08/14/2021 at the [facility] @ 9:30am. [place].    EXPLORATION OF FEMORAL ARTERY Left 07/21/2020    Procedure: EXPLORATION, ARTERY, FEMORAL;  Surgeon: SEMAJ Márquez III, MD;  Location: [facility] OR 2ND FLR;  Service: Peripheral Vascular;  Laterality: Left;  1. Urgent Direct repair, L SFA branch  laceration    FOOT SURGERY      HYSTEROSCOPY WITH DILATION AND CURETTAGE OF UTERUS N/A 02/19/2022    Procedure: HYSTEROSCOPY, WITH DILATION AND CURETTAGE OF UTERUS;  Surgeon: Shane Palomo MD;  Location: [address];  Service: OB/GYN;  Laterality: N/A;    INCISION AND DRAINAGE OF KNEE Left 05/12/2022    Procedure: INCISION AND DRAINAGE, Prepatellar bursectomy.;  Surgeon: Dre Guzmán MD;  [address];  Service: Orthopedics;  Laterality: Left;    LEFT HEART CATHETERIZATION Left 02/07/2023    Procedure: Left heart cath;  Surgeon: Josiah Terry MD;  Location: [facility] CATH LAB;  Service: Cardiology;  Laterality: Left;  borderline moderate bleeding risk 6.3%    OCCLUSION OF LEFT ATRIAL APPENDAGE N/A 06/12/2023    Procedure: Left atrial appendage occlusion;  Surgeon: Gabriel Hawley MD;  Location: [facility] EP LAB;  Service: Cardiology;  Laterality: N/A;  afib, watchman, BSCI, demi, ALIYA ibarra, 3prep    RELEASE OF ULNAR NERVE AT CUBITAL TUNNEL Bilateral     RIGHT HEART CATHETERIZATION Right 08/05/2024    Procedure: INSERTION, CATHETER, RIGHT HEART;  Surgeon: Zane Liu MD;  Location: [facility] CATH LAB;  Service: Cardiology;  Laterality: Right;    ROBOT-ASSISTED LAPAROSCOPIC ABDOMINAL HYSTERECTOMY USING DA KEIRY XI N/A 08/09/2022    Procedure: XI ROBOTIC HYSTERECTOMY;  Surgeon: Yolanda Barriga MD;  Location: [facility];  Service: OB/GYN;  Laterality: N/A;  dual console requested    ROBOT-ASSISTED LAPAROSCOPIC SALPINGO-OOPHORECTOMY Bilateral 08/09/2022    Procedure: ROBOTIC SALPINGO-OOPHORECTOMY;  Surgeon: Yolanda Barriga MD;  Location: [facility];  Service: OB/GYN;  Laterality: Bilateral;    TRANSESOPHAGEAL ECHOCARDIOGRAPHY N/A 06/12/2023    Procedure: ECHOCARDIOGRAM, TRANSESOPHAGEAL;  Surgeon: Cyril Mast MD;  Location: [facility] EP LAB;  Service: Cardiology;  Laterality: N/A;    TREATMENT OF CARDIAC ARRHYTHMIA N/A 01/29/2020    Procedure: CARDIOVERSION;  Surgeon: Gabriel Hawley MD;  Location:  [facility] EP LAB;  Service: Cardiology;  Laterality: N/A;  af, demi, dccv, anes, mb, 345    TREATMENT OF CARDIAC ARRHYTHMIA  01/24/2022    Procedure: Cardioversion or Defibrillation;  Surgeon: Gabriel Hawley MD;  Location: [facility] EP LAB;  Service: Cardiology;;    WOUND DEBRIDEMENT Left 08/06/2020    Procedure: DEBRIDEMENT, WOUND;  Surgeon: SEMAJ Márquez III, MD;  Location: [facility] OR [address] [place] [address];  Service: Peripheral Vascular;  Laterality: Left;       Home Meds:   Prior to Admission medications    Medication Sig Start Date End Date Taking? Authorizing Provider   albuterol (VENTOLIN HFA) 90 mcg/actuation inhaler Inhale 2 puffs into the lungs every 6 (six) hours as needed for Wheezing. Rescue 1/8/25 1/8/26  Gordy Cordero MD   ALPRAZolam (XANAX) 0.5 MG tablet Take 1 tablet (0.5 mg total) by mouth 2 (two) times daily as needed for Anxiety. 2/4/25   Gordy Cordero MD   b complex vitamins capsule Take 1 capsule by mouth every other day.    Provider, Historical   DULoxetine (CYMBALTA) 60 MG capsule Take 1 capsule (60 mg total) by mouth once daily. 8/12/24   Brennon Nunez MD   ferrous sulfate 325 (65 FE) MG EC tablet Take 1 tablet (325 mg total) by mouth every other day. 9/18/24   Eugene Woodward MD   furosemide (LASIX) 20 MG tablet Take 1 tablet (20 mg total) by mouth once daily. 2/5/25 2/5/26  Gordy Cordero MD   ibuprofen (ADVIL,MOTRIN) 600 MG tablet Take 1 tablet (600 mg total) by mouth 2 (two) times daily as needed for Pain. 2/4/25   Gordy Cordero MD   lactulose (CHRONULAC) 10 gram/15 mL solution Take 30 mLs (20 g total) by mouth 3 (three) times daily. Please ensure you are having at least 2-3 bowel movements every day. 1/14/25   Kiko Saba MD   metoprolol succinate (TOPROL-XL) 100 MG 24 hr tablet Take 1 tablet (100 mg total) by mouth 2 (two) times daily. 2/5/25 2/5/26  Marah Hunter, NP   multivitamin (THERAGRAN) per tablet Take 1 tablet by mouth once daily.    Provider, Historical   nystatin  (MYCOSTATIN) powder Apply topically 2 (two) times daily. 11/7/24   Gordy Cordero MD   omeprazole (PRILOSEC) 20 MG capsule TAKE 1 CAPSULE BY MOUTH ONCE DAILY 7/25/24   Gordy Cordero MD   ondansetron (ZOFRAN-ODT) 4 MG TbDL Take 2 tablets (8 mg total) by mouth every 8 (eight) hours as needed (nasuea, vomiting). 1/8/25   Gordy Cordero MD   oxyCODONE (ROXICODONE) 5 MG immediate release tablet Take 1 tablet (5 mg total) by mouth every 12 (twelve) hours as needed for Pain. 2/4/25   Gordy Cordero MD   spironolactone (ALDACTONE) 50 MG tablet Take 1 tablet (50 mg total) by mouth once daily. 11/12/24 6/10/25  Kiko Saba MD   medroxyPROGESTERone (PROVERA) 10 MG tablet Take 2 tablets (20 mg total) by mouth 3 (three) times daily. 7/13/22 8/9/22  Yolanda Barriga MD     Anticoagulants/Antiplatelets: no anticoagulation    Allergies:   Review of patient's allergies indicates:   Allergen Reactions    Flecainide Shortness Of Breath and Swelling    Shellfish containing products Other (See Comments)     Makes gout terrible    Vancomycin analogues Hives, Itching and Rash     Sedation History:  no adverse reactions    Review of Systems:   Hematological: no known coagulopathies  Respiratory: no shortness of breath  Cardiovascular: no chest pain  Gastrointestinal: no abdominal pain  Genito-Urinary: no dysuria  Musculoskeletal: negative  Neurological: no TIA or stroke symptoms         OBJECTIVE:     Vital Signs (Most Recent)  Pulse: 68 (02/07/25 0834)  Resp: 16 (02/07/25 0834)  BP: 121/61 (02/07/25 0834)  SpO2: (!) 94 % (02/07/25 0834)    Physical Exam:  ASA: 2  Mallampati: n/a    General: no acute distress  Mental Status: alert and oriented to person, place and time  HEENT: normocephalic, atraumatic  Chest: unlabored breathing  Heart: regular heart rate  Abdomen: distended  Extremity: moves all extremities    ASSESSMENT/PLAN:     Sedation Plan: Local  Patient will undergo US guided paracentesis.    Shannen Callahan  GAYATRI  Interventional Radiology  [phone]

## 2025-02-07 NOTE — PROCEDURES
Radiology Post-Procedure Note    Pre Op Diagnosis: Ascites  Post Op Diagnosis: Same    Procedure: Ultrasound Guided Paracentesis    Procedure performed by: Shannen Callahan PA-C    Written Informed Consent Obtained: Yes  Specimen Removed: YES (yellow, clear)  Estimated Blood Loss: Minimal    Findings:   Successful paracentesis from the Left.  Albumin administered PRN per protocol.    Patient tolerated procedure well.    Shannen Callahan PA-C  Interventional Radiology  [phone]

## 2025-02-12 ENCOUNTER — PATIENT MESSAGE (OUTPATIENT)
Dept: HEPATOLOGY | Facility: CLINIC | Age: 60
End: 2025-02-12
Payer: MEDICAID

## 2025-02-13 ENCOUNTER — PATIENT MESSAGE (OUTPATIENT)
Dept: HEPATOLOGY | Facility: CLINIC | Age: 60
End: 2025-02-13
Payer: MEDICAID

## 2025-02-13 NOTE — TELEPHONE ENCOUNTER
Contacted the patient,pt stated she is getting better by taking the colchicine.Pt did not read the message from the  providers (pt  portal.)Convey providers message to pt.Pt voice understanding.

## 2025-02-14 ENCOUNTER — HOSPITAL ENCOUNTER (OUTPATIENT)
Dept: INTERVENTIONAL RADIOLOGY/VASCULAR | Facility: HOSPITAL | Age: 60
Discharge: HOME | End: 2025-02-14
Attending: INTERNAL MEDICINE
Payer: MEDICAID

## 2025-02-14 VITALS
HEART RATE: 60 BPM | OXYGEN SATURATION: 100 % | DIASTOLIC BLOOD PRESSURE: 83 MMHG | RESPIRATION RATE: 16 BRPM | SYSTOLIC BLOOD PRESSURE: 144 MMHG

## 2025-02-14 DIAGNOSIS — R18.8 OTHER ASCITES: Chronic | ICD-10-CM

## 2025-02-14 DIAGNOSIS — R18.8 CIRRHOSIS OF LIVER WITH ASCITES, UNSPECIFIED HEPATIC CIRRHOSIS TYPE: Chronic | ICD-10-CM

## 2025-02-14 DIAGNOSIS — K74.60 CIRRHOSIS OF LIVER WITH ASCITES, UNSPECIFIED HEPATIC CIRRHOSIS TYPE: Chronic | ICD-10-CM

## 2025-02-14 PROCEDURE — C1729 CATH, DRAINAGE: HCPCS

## 2025-02-14 PROCEDURE — 63600175 PHARM REV CODE 636 W HCPCS: Performed by: PHYSICIAN ASSISTANT

## 2025-02-14 PROCEDURE — 49083 ABD PARACENTESIS W/IMAGING: CPT | Performed by: RADIOLOGY

## 2025-02-14 RX ORDER — LIDOCAINE HYDROCHLORIDE 10 MG/ML
INJECTION, SOLUTION INFILTRATION; PERINEURAL
Status: COMPLETED | OUTPATIENT
Start: 2025-02-14 | End: 2025-02-14

## 2025-02-14 RX ADMIN — LIDOCAINE HYDROCHLORIDE 10 ML: 10 INJECTION, SOLUTION INFILTRATION; PERINEURAL at 08:02

## 2025-02-14 NOTE — PLAN OF CARE
Paracentesis completed, pt tolerated well. No s/s of complications noted. 4400 ml removed from LLQ, mepore applied CDI. No albumin given per protocol.  Discharge instructions reviewed and acknowledged by patient. Pt discharged via walker.

## 2025-02-14 NOTE — Clinical Note
Left: Abdomen.   Scrubbed with ChloroPrep With Tint.    Hair: N/A.  Skin prep dry before draping.  Prepped by: Bushra Epps PA-C 2/14/2025 8:55 AM.

## 2025-02-14 NOTE — H&P
Radiology History & Physical      SUBJECTIVE:     Chief Complaint: abdominal distention    History of Present Illness:  Vicky Alvarado is a 59 y.o. female who presents for ultrasound guided paracentesis  Past Medical History:   Diagnosis Date    Anticoagulant long-term use     Arthritis     Atrial fibrillation     cardioversion    Atrial fibrillation with RVR     HAS WATCHMAN IN PLACE NOW    Avascular necrosis     L hand    Cellulitis of extremity 05/07/2022    CHF (congestive heart failure)     Chronic fatigue     Cirrhosis of liver with ascites     Depression     Diabetes mellitus     Encounter for blood transfusion 07/22/2020    Encounter for blood transfusion 03/2022    Fatty liver     GERD (gastroesophageal reflux disease)     Hx of psychiatric care     Hypertension     Iron deficiency anemia 05/07/2022    TIBC 444 with saturated iron 8    Left leg cellulitis 05/07/2022    Liver disease     Obese     Pre-diabetes 05/07/2022    Psychiatric problem     Sleep apnea     wears cpap    SOB (shortness of breath) on exertion     Weight loss     75lb intentional weight loss     Past Surgical History:   Procedure Laterality Date    ABLATION OF ARRHYTHMOGENIC FOCUS FOR ATRIAL FIBRILLATION N/A 07/20/2020    Procedure: Ablation atrial fibrillation;  Surgeon: Gabriel Hawley MD;  Location: [facility] EP LAB;  Service: Cardiology;  Laterality: N/A;  afib, PVI, RFA, REENA, SHADY, anes, MB, 3 Prep    ABLATION OF ARRHYTHMOGENIC FOCUS FOR ATRIAL FIBRILLATION N/A 01/24/2022    Procedure: Ablation atrial fibrillation;  Surgeon: Gabriel Hawley MD;  Location: [facility] EP LAB;  Service: Cardiology;  Laterality: N/A;  afib/afl, PVI (re-do)/CTI, RFA, REENA (cx if SR), SHADY, anes, MB, 3 Prep    APPLICATION OF WOUND VACUUM-ASSISTED CLOSURE DEVICE Left 08/03/2020    Procedure: APPLICATION, WOUND VAC;  Surgeon: SEMAJ Márquez III, MD;  Location: [facility] OR [address] [place] [address];  Service: Peripheral Vascular;  Laterality: Left;    APPLICATION OF WOUND  VACUUM-ASSISTED CLOSURE DEVICE Left 08/06/2020    Procedure: APPLICATION, WOUND VAC;  Surgeon: SEMAJ Márquez III, MD;  Location: [facility] OR 2ND FLR;  Service: Peripheral Vascular;  Laterality: Left;    CARPAL TUNNEL RELEASE Right 06/10/2020    Procedure: RELEASE, CARPAL TUNNEL - RIGHT;  Surgeon: Adelaida Hall MD;  Location: [facility] OR;  Service: Orthopedics;  Laterality: Right;  GENERAL AND REGIONAL    CARPAL TUNNEL RELEASE Left 05/05/2021    Procedure: RELEASE, CARPAL TUNNEL, LEFT;  Surgeon: Adelaida Hall MD;  Location: [facility] OR;  Service: Orthopedics;  Laterality: Left;  GENERAL & REGIONAL IN PACU    CARPECTOMY Left 09/06/2023    Procedure: CARPECTOMY;  Surgeon: Adelaida Hall MD;  Location: [facility] OR;  Service: Orthopedics;  Laterality: Left;  MAC/REGIONAL  LEFT PROXIMAL ROW    CLOSURE OF WOUND Left 08/06/2020    Procedure: CLOSURE, WOUND;  Surgeon: SEMAJ Márquez III, MD;  Location: [facility] OR 2ND FLR;  Service: Peripheral Vascular;  Laterality: Left;  Complex    COLONOSCOPY N/A 08/17/2021    Procedure: COLONOSCOPY Suprep;  Surgeon: Loco Deluca MD;  Location: [facility];  Service: Endoscopy;  Laterality: N/A;    ECHOCARDIOGRAM,TRANSESOPHAGEAL N/A 07/24/2023    Procedure: Transesophageal echo (REENA) intra-procedure log documentation;  Surgeon: Leyla Diagnostic Provider;  Location: [facility] EP LAB;  Service: Cardiology;  Laterality: N/A;  s/p WM, REENA, anes, 3 Prep    ESOPHAGOGASTRODUODENOSCOPY N/A 08/17/2021    Procedure: EGD (ESOPHAGOGASTRODUODENOSCOPY);  Surgeon: Loco Deluca MD;  Location: [facility] ENDO;  Service: Endoscopy;  Laterality: N/A;  Patient is schedule to have her Covid test on 08/14/2021 at the [facility] @ 9:30am. [place].    EXPLORATION OF FEMORAL ARTERY Left 07/21/2020    Procedure: EXPLORATION, ARTERY, FEMORAL;  Surgeon: SEMAJ Márquez III, MD;  Location: [facility] OR 2ND FLR;  Service: Peripheral Vascular;  Laterality: Left;  1. Urgent Direct repair, L SFA branch  laceration    FOOT SURGERY      HYSTEROSCOPY WITH DILATION AND CURETTAGE OF UTERUS N/A 02/19/2022    Procedure: HYSTEROSCOPY, WITH DILATION AND CURETTAGE OF UTERUS;  Surgeon: Shane Palomo MD;  Location: [address];  Service: OB/GYN;  Laterality: N/A;    INCISION AND DRAINAGE OF KNEE Left 05/12/2022    Procedure: INCISION AND DRAINAGE, Prepatellar bursectomy.;  Surgeon: Dre Guzmán MD;  [address];  Service: Orthopedics;  Laterality: Left;    LEFT HEART CATHETERIZATION Left 02/07/2023    Procedure: Left heart cath;  Surgeon: Josiah Terry MD;  Location: [facility] CATH LAB;  Service: Cardiology;  Laterality: Left;  borderline moderate bleeding risk 6.3%    OCCLUSION OF LEFT ATRIAL APPENDAGE N/A 06/12/2023    Procedure: Left atrial appendage occlusion;  Surgeon: Gabriel Hawley MD;  Location: [facility] EP LAB;  Service: Cardiology;  Laterality: N/A;  afib, watchman, BSCI, demi, ALIYA ibarra, 3prep    RELEASE OF ULNAR NERVE AT CUBITAL TUNNEL Bilateral     RIGHT HEART CATHETERIZATION Right 08/05/2024    Procedure: INSERTION, CATHETER, RIGHT HEART;  Surgeon: Zane Liu MD;  Location: [facility] CATH LAB;  Service: Cardiology;  Laterality: Right;    ROBOT-ASSISTED LAPAROSCOPIC ABDOMINAL HYSTERECTOMY USING DA KEIRY XI N/A 08/09/2022    Procedure: XI ROBOTIC HYSTERECTOMY;  Surgeon: Yolanda Barriga MD;  Location: [facility];  Service: OB/GYN;  Laterality: N/A;  dual console requested    ROBOT-ASSISTED LAPAROSCOPIC SALPINGO-OOPHORECTOMY Bilateral 08/09/2022    Procedure: ROBOTIC SALPINGO-OOPHORECTOMY;  Surgeon: Yolanda Barriga MD;  Location: [facility];  Service: OB/GYN;  Laterality: Bilateral;    TRANSESOPHAGEAL ECHOCARDIOGRAPHY N/A 06/12/2023    Procedure: ECHOCARDIOGRAM, TRANSESOPHAGEAL;  Surgeon: Cyril Mast MD;  Location: [facility] EP LAB;  Service: Cardiology;  Laterality: N/A;    TREATMENT OF CARDIAC ARRHYTHMIA N/A 01/29/2020    Procedure: CARDIOVERSION;  Surgeon: Gabriel Hawley MD;  Location:  [facility] EP LAB;  Service: Cardiology;  Laterality: N/A;  af, demi, dccv, anes, mb, 345    TREATMENT OF CARDIAC ARRHYTHMIA  01/24/2022    Procedure: Cardioversion or Defibrillation;  Surgeon: Gabriel Hawley MD;  Location: [facility] EP LAB;  Service: Cardiology;;    WOUND DEBRIDEMENT Left 08/06/2020    Procedure: DEBRIDEMENT, WOUND;  Surgeon: SEMAJ Márquez III, MD;  Location: [facility] OR [address] [place] [address];  Service: Peripheral Vascular;  Laterality: Left;       Home Meds:   Prior to Admission medications    Medication Sig Start Date End Date Taking? Authorizing Provider   albuterol (VENTOLIN HFA) 90 mcg/actuation inhaler Inhale 2 puffs into the lungs every 6 (six) hours as needed for Wheezing. Rescue 1/8/25 1/8/26  Gordy Cordero MD   ALPRAZolam (XANAX) 0.5 MG tablet Take 1 tablet (0.5 mg total) by mouth 2 (two) times daily as needed for Anxiety. 2/4/25   Gordy Cordero MD   b complex vitamins capsule Take 1 capsule by mouth every other day.    Provider, Historical   DULoxetine (CYMBALTA) 60 MG capsule Take 1 capsule (60 mg total) by mouth once daily. 8/12/24   Brennon Nunez MD   ferrous sulfate 325 (65 FE) MG EC tablet Take 1 tablet (325 mg total) by mouth every other day. 9/18/24   Eugene Woodward MD   furosemide (LASIX) 20 MG tablet Take 1 tablet (20 mg total) by mouth once daily. 2/5/25 2/5/26  Gordy Cordero MD   ibuprofen (ADVIL,MOTRIN) 600 MG tablet Take 1 tablet (600 mg total) by mouth 2 (two) times daily as needed for Pain. 2/4/25   Gordy Cordero MD   lactulose (CHRONULAC) 10 gram/15 mL solution Take 30 mLs (20 g total) by mouth 3 (three) times daily. Please ensure you are having at least 2-3 bowel movements every day. 1/14/25   Kiko Saba MD   metoprolol succinate (TOPROL-XL) 100 MG 24 hr tablet Take 1 tablet (100 mg total) by mouth 2 (two) times daily. 2/5/25 2/5/26  Marah Hunter, NP   multivitamin (THERAGRAN) per tablet Take 1 tablet by mouth once daily.    Provider, Historical   nystatin  (MYCOSTATIN) powder Apply topically 2 (two) times daily. 11/7/24   Gordy Cordero MD   omeprazole (PRILOSEC) 20 MG capsule TAKE 1 CAPSULE BY MOUTH ONCE DAILY 7/25/24   Gordy Cordero MD   ondansetron (ZOFRAN-ODT) 4 MG TbDL Take 2 tablets (8 mg total) by mouth every 8 (eight) hours as needed (nasuea, vomiting). 1/8/25   Gordy Cordero MD   oxyCODONE (ROXICODONE) 5 MG immediate release tablet Take 1 tablet (5 mg total) by mouth every 12 (twelve) hours as needed for Pain. 2/4/25   Gordy Cordero MD   spironolactone (ALDACTONE) 50 MG tablet Take 1 tablet (50 mg total) by mouth once daily. 11/12/24 6/10/25  Kiko Saba MD   medroxyPROGESTERone (PROVERA) 10 MG tablet Take 2 tablets (20 mg total) by mouth 3 (three) times daily. 7/13/22 8/9/22  Yolanda Barriga MD     Anticoagulants/Antiplatelets: no anticoagulation    Allergies:   Review of patient's allergies indicates:   Allergen Reactions    Flecainide Shortness Of Breath and Swelling    Shellfish containing products Other (See Comments)     Makes gout terrible    Vancomycin analogues Hives, Itching and Rash     Sedation History:  no adverse reactions    Review of Systems:   Hematological: no known coagulopathies  Respiratory: no shortness of breath  Cardiovascular: no chest pain  Gastrointestinal: no abdominal pain  Genito-Urinary: no dysuria  Musculoskeletal: negative  Neurological: no TIA or stroke symptoms         OBJECTIVE:     Vital Signs (Most Recent)  Pulse: 67 (02/14/25 0825)  Resp: 16 (02/14/25 0825)  BP: (!) 161/70 (02/14/25 0825)  SpO2: 100 % (02/14/25 0825)    Physical Exam:  ASA: 2  Mallampati: n/a    General: no acute distress  Mental Status: alert and oriented to person, place and time  HEENT: normocephalic, atraumatic  Chest: unlabored breathing  Heart: regular heart rate  Abdomen: distended  Extremity: moves all extremities    ASSESSMENT/PLAN:     Sedation Plan: local  Patient will undergo ultrasound guided paracentesis.    Bushra  GAYATRI Epps  Interventional Radiology   Spectra: [id number]

## 2025-02-14 NOTE — DISCHARGE INSTRUCTIONS
Interventional Radiology Clinic    [phone], choose prompt 3, Monday - Friday, 8:00 am - 4:00 pm    [phone] After hours and on holidays. Ask to speak with the interventional radiologist on call.

## 2025-02-14 NOTE — PROCEDURES
Radiology Post-Procedure Note    Pre Op Diagnosis: Ascites  Post Op Diagnosis: Same    Procedure: Ultrasound Guided Paracentesis    Procedure performed by: Bushra Epps PA-C    Written Informed Consent Obtained: Yes  Specimen Removed: YES, serous fluid  Estimated Blood Loss: Minimal    Findings:   Successful paracentesis. Access obtained in the LLQ. Albumin administered PRN per protocol.    Patient tolerated procedure well.    Bushra Epps PA-C  Interventional Radiology   Spectra: [id number]

## 2025-02-21 ENCOUNTER — HOSPITAL ENCOUNTER (OUTPATIENT)
Dept: INTERVENTIONAL RADIOLOGY/VASCULAR | Facility: HOSPITAL | Age: 60
Discharge: HOME | End: 2025-02-21
Attending: INTERNAL MEDICINE | Admitting: INTERNAL MEDICINE
Payer: MEDICAID

## 2025-02-21 VITALS
DIASTOLIC BLOOD PRESSURE: 77 MMHG | OXYGEN SATURATION: 100 % | SYSTOLIC BLOOD PRESSURE: 169 MMHG | RESPIRATION RATE: 18 BRPM | HEART RATE: 58 BPM

## 2025-02-21 DIAGNOSIS — R18.8 OTHER ASCITES: Chronic | ICD-10-CM

## 2025-02-21 DIAGNOSIS — K74.60 CIRRHOSIS OF LIVER WITH ASCITES, UNSPECIFIED HEPATIC CIRRHOSIS TYPE: Chronic | ICD-10-CM

## 2025-02-21 DIAGNOSIS — R18.8 CIRRHOSIS OF LIVER WITH ASCITES, UNSPECIFIED HEPATIC CIRRHOSIS TYPE: Chronic | ICD-10-CM

## 2025-02-21 PROCEDURE — 49083 ABD PARACENTESIS W/IMAGING: CPT | Performed by: STUDENT IN AN ORGANIZED HEALTH CARE EDUCATION/TRAINING PROGRAM

## 2025-02-21 PROCEDURE — 49083 ABD PARACENTESIS W/IMAGING: CPT | Mod: ,,, | Performed by: FAMILY MEDICINE

## 2025-02-21 PROCEDURE — C1729 CATH, DRAINAGE: HCPCS

## 2025-02-21 RX ORDER — LIDOCAINE HYDROCHLORIDE 10 MG/ML
INJECTION, SOLUTION EPIDURAL; INFILTRATION; INTRACAUDAL; PERINEURAL
Status: DISPENSED
Start: 2025-02-21 | End: 2025-02-21

## 2025-02-21 NOTE — H&P
Radiology History & Physical      SUBJECTIVE:     Chief Complaint: abdominal distention    History of Present Illness:  Vicky Alvarado is a 59 y.o. female who presents for ultrasound guided paracentesis  Past Medical History:   Diagnosis Date    Anticoagulant long-term use     Arthritis     Atrial fibrillation     cardioversion    Atrial fibrillation with RVR     HAS WATCHMAN IN PLACE NOW    Avascular necrosis     L hand    Cellulitis of extremity 05/07/2022    CHF (congestive heart failure)     Chronic fatigue     Cirrhosis of liver with ascites     Depression     Diabetes mellitus     Encounter for blood transfusion 07/22/2020    Encounter for blood transfusion 03/2022    Fatty liver     GERD (gastroesophageal reflux disease)     Hx of psychiatric care     Hypertension     Iron deficiency anemia 05/07/2022    TIBC 444 with saturated iron 8    Left leg cellulitis 05/07/2022    Liver disease     Obese     Pre-diabetes 05/07/2022    Psychiatric problem     Sleep apnea     wears cpap    SOB (shortness of breath) on exertion     Weight loss     75lb intentional weight loss     Past Surgical History:   Procedure Laterality Date    ABLATION OF ARRHYTHMOGENIC FOCUS FOR ATRIAL FIBRILLATION N/A 07/20/2020    Procedure: Ablation atrial fibrillation;  Surgeon: Gabriel Hawley MD;  Location: [facility] EP LAB;  Service: Cardiology;  Laterality: N/A;  afib, PVI, RFA, REENA, SHADY, anes, MB, 3 Prep    ABLATION OF ARRHYTHMOGENIC FOCUS FOR ATRIAL FIBRILLATION N/A 01/24/2022    Procedure: Ablation atrial fibrillation;  Surgeon: Gabriel Hawley MD;  Location: [facility] EP LAB;  Service: Cardiology;  Laterality: N/A;  afib/afl, PVI (re-do)/CTI, RFA, REENA (cx if SR), SHADY, anes, MB, 3 Prep    APPLICATION OF WOUND VACUUM-ASSISTED CLOSURE DEVICE Left 08/03/2020    Procedure: APPLICATION, WOUND VAC;  Surgeon: SEMAJ Márquez III, MD;  Location: [facility] OR [address] [place] [address];  Service: Peripheral Vascular;  Laterality: Left;    APPLICATION OF WOUND  VACUUM-ASSISTED CLOSURE DEVICE Left 08/06/2020    Procedure: APPLICATION, WOUND VAC;  Surgeon: SEMAJ Márquez III, MD;  Location: [facility] OR 2ND FLR;  Service: Peripheral Vascular;  Laterality: Left;    CARPAL TUNNEL RELEASE Right 06/10/2020    Procedure: RELEASE, CARPAL TUNNEL - RIGHT;  Surgeon: Adelaida Hall MD;  Location: [facility] OR;  Service: Orthopedics;  Laterality: Right;  GENERAL AND REGIONAL    CARPAL TUNNEL RELEASE Left 05/05/2021    Procedure: RELEASE, CARPAL TUNNEL, LEFT;  Surgeon: Adelaida Hall MD;  Location: [facility] OR;  Service: Orthopedics;  Laterality: Left;  GENERAL & REGIONAL IN PACU    CARPECTOMY Left 09/06/2023    Procedure: CARPECTOMY;  Surgeon: Adelaida Hall MD;  Location: [facility] OR;  Service: Orthopedics;  Laterality: Left;  MAC/REGIONAL  LEFT PROXIMAL ROW    CLOSURE OF WOUND Left 08/06/2020    Procedure: CLOSURE, WOUND;  Surgeon: SEMAJ Márquez III, MD;  Location: [facility] OR 2ND FLR;  Service: Peripheral Vascular;  Laterality: Left;  Complex    COLONOSCOPY N/A 08/17/2021    Procedure: COLONOSCOPY Suprep;  Surgeon: Loco Deluca MD;  Location: [facility];  Service: Endoscopy;  Laterality: N/A;    ECHOCARDIOGRAM,TRANSESOPHAGEAL N/A 07/24/2023    Procedure: Transesophageal echo (REENA) intra-procedure log documentation;  Surgeon: Leyla Diagnostic Provider;  Location: [facility] EP LAB;  Service: Cardiology;  Laterality: N/A;  s/p WM, REENA, anes, 3 Prep    ESOPHAGOGASTRODUODENOSCOPY N/A 08/17/2021    Procedure: EGD (ESOPHAGOGASTRODUODENOSCOPY);  Surgeon: Loco Deluca MD;  Location: [facility] ENDO;  Service: Endoscopy;  Laterality: N/A;  Patient is schedule to have her Covid test on 08/14/2021 at the [facility] @ 9:30am. [place].    EXPLORATION OF FEMORAL ARTERY Left 07/21/2020    Procedure: EXPLORATION, ARTERY, FEMORAL;  Surgeon: SEMAJ Márquez III, MD;  Location: [facility] OR 2ND FLR;  Service: Peripheral Vascular;  Laterality: Left;  1. Urgent Direct repair, L SFA branch  laceration    FOOT SURGERY      HYSTEROSCOPY WITH DILATION AND CURETTAGE OF UTERUS N/A 02/19/2022    Procedure: HYSTEROSCOPY, WITH DILATION AND CURETTAGE OF UTERUS;  Surgeon: Shane Palomo MD;  Location: [address];  Service: OB/GYN;  Laterality: N/A;    INCISION AND DRAINAGE OF KNEE Left 05/12/2022    Procedure: INCISION AND DRAINAGE, Prepatellar bursectomy.;  Surgeon: Dre Guzmán MD;  [address];  Service: Orthopedics;  Laterality: Left;    LEFT HEART CATHETERIZATION Left 02/07/2023    Procedure: Left heart cath;  Surgeon: Josiah Terry MD;  Location: [facility] CATH LAB;  Service: Cardiology;  Laterality: Left;  borderline moderate bleeding risk 6.3%    OCCLUSION OF LEFT ATRIAL APPENDAGE N/A 06/12/2023    Procedure: Left atrial appendage occlusion;  Surgeon: Gabriel Hawley MD;  Location: [facility] EP LAB;  Service: Cardiology;  Laterality: N/A;  afib, watchman, BSCI, demi, ALIYA ibarra, 3prep    RELEASE OF ULNAR NERVE AT CUBITAL TUNNEL Bilateral     RIGHT HEART CATHETERIZATION Right 08/05/2024    Procedure: INSERTION, CATHETER, RIGHT HEART;  Surgeon: Zane Liu MD;  Location: [facility] CATH LAB;  Service: Cardiology;  Laterality: Right;    ROBOT-ASSISTED LAPAROSCOPIC ABDOMINAL HYSTERECTOMY USING DA KEIRY XI N/A 08/09/2022    Procedure: XI ROBOTIC HYSTERECTOMY;  Surgeon: Yolanda Barriga MD;  Location: [facility];  Service: OB/GYN;  Laterality: N/A;  dual console requested    ROBOT-ASSISTED LAPAROSCOPIC SALPINGO-OOPHORECTOMY Bilateral 08/09/2022    Procedure: ROBOTIC SALPINGO-OOPHORECTOMY;  Surgeon: Yolanda Barriga MD;  Location: [facility];  Service: OB/GYN;  Laterality: Bilateral;    TRANSESOPHAGEAL ECHOCARDIOGRAPHY N/A 06/12/2023    Procedure: ECHOCARDIOGRAM, TRANSESOPHAGEAL;  Surgeon: Cyril Mast MD;  Location: [facility] EP LAB;  Service: Cardiology;  Laterality: N/A;    TREATMENT OF CARDIAC ARRHYTHMIA N/A 01/29/2020    Procedure: CARDIOVERSION;  Surgeon: Gabriel Hawley MD;  Location:  [facility] EP LAB;  Service: Cardiology;  Laterality: N/A;  af, demi, dccv, anes, mb, 345    TREATMENT OF CARDIAC ARRHYTHMIA  01/24/2022    Procedure: Cardioversion or Defibrillation;  Surgeon: Gabriel Hwaley MD;  Location: [facility] EP LAB;  Service: Cardiology;;    WOUND DEBRIDEMENT Left 08/06/2020    Procedure: DEBRIDEMENT, WOUND;  Surgeon: SEMAJ Márquez III, MD;  Location: [facility] OR [address] [place] [address];  Service: Peripheral Vascular;  Laterality: Left;       Home Meds:   Prior to Admission medications    Medication Sig Start Date End Date Taking? Authorizing Provider   albuterol (VENTOLIN HFA) 90 mcg/actuation inhaler Inhale 2 puffs into the lungs every 6 (six) hours as needed for Wheezing. Rescue 1/8/25 1/8/26  Gordy Cordero MD   ALPRAZolam (XANAX) 0.5 MG tablet Take 1 tablet (0.5 mg total) by mouth 2 (two) times daily as needed for Anxiety. 2/4/25   Gordy Cordero MD   b complex vitamins capsule Take 1 capsule by mouth every other day.    Provider, Historical   DULoxetine (CYMBALTA) 60 MG capsule Take 1 capsule (60 mg total) by mouth once daily. 8/12/24   Brennon Nunez MD   ferrous sulfate 325 (65 FE) MG EC tablet Take 1 tablet (325 mg total) by mouth every other day. 9/18/24   Eugene Woodward MD   furosemide (LASIX) 20 MG tablet Take 1 tablet (20 mg total) by mouth once daily. 2/5/25 2/5/26  Gordy Cordero MD   ibuprofen (ADVIL,MOTRIN) 600 MG tablet Take 1 tablet (600 mg total) by mouth 2 (two) times daily as needed for Pain. 2/4/25   Gordy Cordero MD   lactulose (CHRONULAC) 10 gram/15 mL solution Take 30 mLs (20 g total) by mouth 3 (three) times daily. Please ensure you are having at least 2-3 bowel movements every day. 1/14/25   Kiko Saba MD   metoprolol succinate (TOPROL-XL) 100 MG 24 hr tablet Take 1 tablet (100 mg total) by mouth 2 (two) times daily. 2/5/25 2/5/26  Marah Hunter, NP   multivitamin (THERAGRAN) per tablet Take 1 tablet by mouth once daily.    Provider, Historical   nystatin  (MYCOSTATIN) powder Apply topically 2 (two) times daily. 2/14/25   Gordy Cordero MD   omeprazole (PRILOSEC) 20 MG capsule TAKE 1 CAPSULE BY MOUTH ONCE DAILY 7/25/24   Gordy Cordero MD   ondansetron (ZOFRAN-ODT) 4 MG TbDL Take 2 tablets (8 mg total) by mouth every 8 (eight) hours as needed (nasuea, vomiting). 1/8/25   Gordy Cordero MD   oxyCODONE (ROXICODONE) 5 MG immediate release tablet Take 1 tablet (5 mg total) by mouth every 12 (twelve) hours as needed for Pain. 2/4/25   Gordy Cordero MD   spironolactone (ALDACTONE) 50 MG tablet Take 1 tablet (50 mg total) by mouth once daily. 11/12/24 6/10/25  Kiko Saba MD   medroxyPROGESTERone (PROVERA) 10 MG tablet Take 2 tablets (20 mg total) by mouth 3 (three) times daily. 7/13/22 8/9/22  Yolanda Barriga MD     Anticoagulants/Antiplatelets: no anticoagulation    Allergies:   Review of patient's allergies indicates:   Allergen Reactions    Flecainide Shortness Of Breath and Swelling    Shellfish containing products Other (See Comments)     Makes gout terrible    Vancomycin analogues Hives, Itching and Rash     Sedation History:  no adverse reactions    Review of Systems:   Hematological: no known coagulopathies  Respiratory: no shortness of breath  Cardiovascular: no chest pain  Gastrointestinal: no abdominal pain  Genito-Urinary: no dysuria  Musculoskeletal: negative  Neurological: no TIA or stroke symptoms         OBJECTIVE:     Vital Signs (Most Recent)  Pulse: (!) 57 (02/21/25 0843)  Resp: 18 (02/21/25 0843)  BP: (!) 163/69 (02/21/25 0843)  SpO2: 100 % (02/21/25 0843)    Physical Exam:  ASA: 2  Mallampati: n/a    General: no acute distress  Mental Status: alert and oriented to person, place and time  HEENT: normocephalic, atraumatic  Chest: unlabored breathing  Heart: regular heart rate  Abdomen: distended  Extremity: moves all extremities    ASSESSMENT/PLAN:     Sedation Plan: local  Patient will undergo ultrasound guided  paracentesis.    SCOUT Wolfe, FNP  Interventional Radiology  [phone] clinic

## 2025-02-21 NOTE — PROCEDURES
Radiology Post-Procedure Note    Pre Op Diagnosis: Ascites  Post Op Diagnosis: Same    Procedure: Ultrasound Guided Paracentesis    Procedure performed by: Jannet CUNNINGHAM, Samantha     Written Informed Consent Obtained: Yes  Specimen Removed: YES serous  Estimated Blood Loss: Minimal    Findings:   Successful LLQ paracentesis.  Albumin administered PRN per protocol.    Patient tolerated procedure well.    Samantha Power, APRN, FNP  Interventional Radiology  [phone] clinic

## 2025-02-27 ENCOUNTER — PATIENT MESSAGE (OUTPATIENT)
Dept: HEPATOLOGY | Facility: CLINIC | Age: 60
End: 2025-02-27
Payer: MEDICAID

## 2025-02-27 NOTE — TELEPHONE ENCOUNTER
Pt calls stated needs para for tomorrow.Forwarded message to Interventional schedulers.Forward message to Claudia for further assistance.Pt been scheduled for her Para on 2/28/2025.

## 2025-02-28 ENCOUNTER — HOSPITAL ENCOUNTER (OUTPATIENT)
Dept: INTERVENTIONAL RADIOLOGY/VASCULAR | Facility: HOSPITAL | Age: 60
Discharge: HOME | End: 2025-02-28
Attending: INTERNAL MEDICINE
Payer: MEDICAID

## 2025-02-28 VITALS
SYSTOLIC BLOOD PRESSURE: 141 MMHG | HEART RATE: 56 BPM | OXYGEN SATURATION: 97 % | DIASTOLIC BLOOD PRESSURE: 68 MMHG | RESPIRATION RATE: 18 BRPM

## 2025-02-28 DIAGNOSIS — R18.8 OTHER ASCITES: Chronic | ICD-10-CM

## 2025-02-28 DIAGNOSIS — K74.60 CIRRHOSIS OF LIVER WITH ASCITES, UNSPECIFIED HEPATIC CIRRHOSIS TYPE: Chronic | ICD-10-CM

## 2025-02-28 DIAGNOSIS — R18.8 CIRRHOSIS OF LIVER WITH ASCITES, UNSPECIFIED HEPATIC CIRRHOSIS TYPE: Chronic | ICD-10-CM

## 2025-02-28 PROCEDURE — 49083 ABD PARACENTESIS W/IMAGING: CPT | Performed by: RADIOLOGY

## 2025-02-28 PROCEDURE — 63600175 PHARM REV CODE 636 W HCPCS: Performed by: INTERNAL MEDICINE

## 2025-02-28 PROCEDURE — 49083 ABD PARACENTESIS W/IMAGING: CPT | Mod: ,,, | Performed by: FAMILY MEDICINE

## 2025-02-28 PROCEDURE — C1729 CATH, DRAINAGE: HCPCS

## 2025-02-28 RX ORDER — LIDOCAINE HYDROCHLORIDE 10 MG/ML
INJECTION, SOLUTION INFILTRATION; PERINEURAL
Status: COMPLETED | OUTPATIENT
Start: 2025-02-28 | End: 2025-02-28

## 2025-02-28 RX ADMIN — LIDOCAINE HYDROCHLORIDE 10 ML: 10 INJECTION, SOLUTION INFILTRATION; PERINEURAL at 08:02

## 2025-02-28 NOTE — PLAN OF CARE
Patient tolerated paracentesis well. 3350 ml serous ascites drained. No specimens ordered. No Albumin indicated per protocol. Patient discharged home.    Vitals:    02/28/25 0856   BP: (!) 141/68   Pulse: (!) 56   Resp: 18

## 2025-02-28 NOTE — PROCEDURES
Radiology Post-Procedure Note    Pre Op Diagnosis: Ascites  Post Op Diagnosis: Same    Procedure: Ultrasound Guided Paracentesis    Procedure performed by: Jannet CUNNINGHAM, Samantha     Written Informed Consent Obtained: Yes  Specimen Removed: YES serous  Estimated Blood Loss: Minimal    Findings:   Successful LLQ paracentesis.  Albumin administered PRN per protocol.    Patient tolerated procedure well.    Samantha Power, APRN, FNP  Interventional Radiology  [phone] clinic

## 2025-02-28 NOTE — PLAN OF CARE
Patient presents for US-guided paracentesis. Patient is awake and alert. Awaiting evaluation by provider and procedure consent.     Vitals:    02/28/25 0823   BP: (!) 153/67   Pulse: (!) 56   Resp: 18

## 2025-02-28 NOTE — H&P
Radiology History & Physical      SUBJECTIVE:     Chief Complaint: abdominal distention    History of Present Illness:  Vicky Alvarado is a 59 y.o. female who presents for ultrasound guided paracentesis  Past Medical History:   Diagnosis Date    Anticoagulant long-term use     Arthritis     Atrial fibrillation     cardioversion    Atrial fibrillation with RVR     HAS WATCHMAN IN PLACE NOW    Avascular necrosis     L hand    Cellulitis of extremity 05/07/2022    CHF (congestive heart failure)     Chronic fatigue     Cirrhosis of liver with ascites     Depression     Diabetes mellitus     Encounter for blood transfusion 07/22/2020    Encounter for blood transfusion 03/2022    Fatty liver     GERD (gastroesophageal reflux disease)     Hx of psychiatric care     Hypertension     Iron deficiency anemia 05/07/2022    TIBC 444 with saturated iron 8    Left leg cellulitis 05/07/2022    Liver disease     Obese     Pre-diabetes 05/07/2022    Psychiatric problem     Sleep apnea     wears cpap    SOB (shortness of breath) on exertion     Weight loss     75lb intentional weight loss     Past Surgical History:   Procedure Laterality Date    ABLATION OF ARRHYTHMOGENIC FOCUS FOR ATRIAL FIBRILLATION N/A 07/20/2020    Procedure: Ablation atrial fibrillation;  Surgeon: Gabriel Hawley MD;  Location: [facility] EP LAB;  Service: Cardiology;  Laterality: N/A;  afib, PVI, RFA, REENA, SHADY, anes, MB, 3 Prep    ABLATION OF ARRHYTHMOGENIC FOCUS FOR ATRIAL FIBRILLATION N/A 01/24/2022    Procedure: Ablation atrial fibrillation;  Surgeon: Gabriel Hawley MD;  Location: [facility] EP LAB;  Service: Cardiology;  Laterality: N/A;  afib/afl, PVI (re-do)/CTI, RFA, REENA (cx if SR), SHADY, anes, MB, 3 Prep    APPLICATION OF WOUND VACUUM-ASSISTED CLOSURE DEVICE Left 08/03/2020    Procedure: APPLICATION, WOUND VAC;  Surgeon: SEMAJ Márquez III, MD;  Location: [facility] OR [address] [place] [address];  Service: Peripheral Vascular;  Laterality: Left;    APPLICATION OF WOUND  VACUUM-ASSISTED CLOSURE DEVICE Left 08/06/2020    Procedure: APPLICATION, WOUND VAC;  Surgeon: SEMAJ Márquez III, MD;  Location: [facility] OR 2ND FLR;  Service: Peripheral Vascular;  Laterality: Left;    CARPAL TUNNEL RELEASE Right 06/10/2020    Procedure: RELEASE, CARPAL TUNNEL - RIGHT;  Surgeon: Adelaida Hall MD;  Location: [facility] OR;  Service: Orthopedics;  Laterality: Right;  GENERAL AND REGIONAL    CARPAL TUNNEL RELEASE Left 05/05/2021    Procedure: RELEASE, CARPAL TUNNEL, LEFT;  Surgeon: Adelaida Hall MD;  Location: [facility] OR;  Service: Orthopedics;  Laterality: Left;  GENERAL & REGIONAL IN PACU    CARPECTOMY Left 09/06/2023    Procedure: CARPECTOMY;  Surgeon: Adelaida Hall MD;  Location: [facility] OR;  Service: Orthopedics;  Laterality: Left;  MAC/REGIONAL  LEFT PROXIMAL ROW    CLOSURE OF WOUND Left 08/06/2020    Procedure: CLOSURE, WOUND;  Surgeon: SEMAJ Márquez III, MD;  Location: [facility] OR 2ND FLR;  Service: Peripheral Vascular;  Laterality: Left;  Complex    COLONOSCOPY N/A 08/17/2021    Procedure: COLONOSCOPY Suprep;  Surgeon: Loco Deluca MD;  Location: [facility];  Service: Endoscopy;  Laterality: N/A;    ECHOCARDIOGRAM,TRANSESOPHAGEAL N/A 07/24/2023    Procedure: Transesophageal echo (REENA) intra-procedure log documentation;  Surgeon: Leyla Diagnostic Provider;  Location: [facility] EP LAB;  Service: Cardiology;  Laterality: N/A;  s/p WM, REENA, anes, 3 Prep    ESOPHAGOGASTRODUODENOSCOPY N/A 08/17/2021    Procedure: EGD (ESOPHAGOGASTRODUODENOSCOPY);  Surgeon: Loco Deluca MD;  Location: [facility] ENDO;  Service: Endoscopy;  Laterality: N/A;  Patient is schedule to have her Covid test on 08/14/2021 at the [facility] @ 9:30am. [place].    EXPLORATION OF FEMORAL ARTERY Left 07/21/2020    Procedure: EXPLORATION, ARTERY, FEMORAL;  Surgeon: SEMAJ Márquez III, MD;  Location: [facility] OR 2ND FLR;  Service: Peripheral Vascular;  Laterality: Left;  1. Urgent Direct repair, L SFA branch  laceration    FOOT SURGERY      HYSTEROSCOPY WITH DILATION AND CURETTAGE OF UTERUS N/A 02/19/2022    Procedure: HYSTEROSCOPY, WITH DILATION AND CURETTAGE OF UTERUS;  Surgeon: Shane Palomo MD;  Location: [address];  Service: OB/GYN;  Laterality: N/A;    INCISION AND DRAINAGE OF KNEE Left 05/12/2022    Procedure: INCISION AND DRAINAGE, Prepatellar bursectomy.;  Surgeon: Dre Guzmán MD;  [address];  Service: Orthopedics;  Laterality: Left;    LEFT HEART CATHETERIZATION Left 02/07/2023    Procedure: Left heart cath;  Surgeon: Josiah Terry MD;  Location: [facility] CATH LAB;  Service: Cardiology;  Laterality: Left;  borderline moderate bleeding risk 6.3%    OCCLUSION OF LEFT ATRIAL APPENDAGE N/A 06/12/2023    Procedure: Left atrial appendage occlusion;  Surgeon: Gabriel Hawley MD;  Location: [facility] EP LAB;  Service: Cardiology;  Laterality: N/A;  afib, watchman, BSCI, demi, ALIYA ibarra, 3prep    RELEASE OF ULNAR NERVE AT CUBITAL TUNNEL Bilateral     RIGHT HEART CATHETERIZATION Right 08/05/2024    Procedure: INSERTION, CATHETER, RIGHT HEART;  Surgeon: Zane Liu MD;  Location: [facility] CATH LAB;  Service: Cardiology;  Laterality: Right;    ROBOT-ASSISTED LAPAROSCOPIC ABDOMINAL HYSTERECTOMY USING DA KEIRY XI N/A 08/09/2022    Procedure: XI ROBOTIC HYSTERECTOMY;  Surgeon: Yolanda Barriga MD;  Location: [facility];  Service: OB/GYN;  Laterality: N/A;  dual console requested    ROBOT-ASSISTED LAPAROSCOPIC SALPINGO-OOPHORECTOMY Bilateral 08/09/2022    Procedure: ROBOTIC SALPINGO-OOPHORECTOMY;  Surgeon: Yolanda Barriga MD;  Location: [facility];  Service: OB/GYN;  Laterality: Bilateral;    TRANSESOPHAGEAL ECHOCARDIOGRAPHY N/A 06/12/2023    Procedure: ECHOCARDIOGRAM, TRANSESOPHAGEAL;  Surgeon: Cyril Mast MD;  Location: [facility] EP LAB;  Service: Cardiology;  Laterality: N/A;    TREATMENT OF CARDIAC ARRHYTHMIA N/A 01/29/2020    Procedure: CARDIOVERSION;  Surgeon: Gabriel Hawley MD;  Location:  [facility] EP LAB;  Service: Cardiology;  Laterality: N/A;  af, demi, dccv, anes, mb, 345    TREATMENT OF CARDIAC ARRHYTHMIA  01/24/2022    Procedure: Cardioversion or Defibrillation;  Surgeon: Gabriel Hawley MD;  Location: [facility] EP LAB;  Service: Cardiology;;    WOUND DEBRIDEMENT Left 08/06/2020    Procedure: DEBRIDEMENT, WOUND;  Surgeon: SEMAJ Márquez III, MD;  Location: [facility] OR [address] [place] [address];  Service: Peripheral Vascular;  Laterality: Left;       Home Meds:   Prior to Admission medications    Medication Sig Start Date End Date Taking? Authorizing Provider   albuterol (VENTOLIN HFA) 90 mcg/actuation inhaler Inhale 2 puffs into the lungs every 6 (six) hours as needed for Wheezing. Rescue 1/8/25 1/8/26  Gordy Cordero MD   ALPRAZolam (XANAX) 0.5 MG tablet Take 1 tablet (0.5 mg total) by mouth 2 (two) times daily as needed for Anxiety. 2/4/25   Gordy Cordero MD   b complex vitamins capsule Take 1 capsule by mouth every other day.    Provider, Historical   DULoxetine (CYMBALTA) 60 MG capsule Take 1 capsule (60 mg total) by mouth once daily. 8/12/24   Brennon Nunez MD   ferrous sulfate 325 (65 FE) MG EC tablet Take 1 tablet (325 mg total) by mouth every other day. 9/18/24   Eugene Woodward MD   furosemide (LASIX) 20 MG tablet Take 1 tablet (20 mg total) by mouth once daily. 2/5/25 2/5/26  Gordy Cordero MD   ibuprofen (ADVIL,MOTRIN) 600 MG tablet Take 1 tablet (600 mg total) by mouth 2 (two) times daily as needed for Pain. 2/4/25   Gordy Cordero MD   lactulose (CHRONULAC) 10 gram/15 mL solution Take 30 mLs (20 g total) by mouth 3 (three) times daily. Please ensure you are having at least 2-3 bowel movements every day. 1/14/25   Kiko Saba MD   metoprolol succinate (TOPROL-XL) 100 MG 24 hr tablet Take 1 tablet (100 mg total) by mouth 2 (two) times daily. 2/5/25 2/5/26  Marah Hunter, NP   multivitamin (THERAGRAN) per tablet Take 1 tablet by mouth once daily.    Provider, Historical   nystatin  (MYCOSTATIN) powder Apply topically 2 (two) times daily. 2/14/25   Gordy Cordero MD   omeprazole (PRILOSEC) 20 MG capsule TAKE 1 CAPSULE BY MOUTH ONCE DAILY 7/25/24   Gordy Cordero MD   ondansetron (ZOFRAN-ODT) 4 MG TbDL Take 2 tablets (8 mg total) by mouth every 8 (eight) hours as needed (nasuea, vomiting). 1/8/25   Gordy Cordero MD   oxyCODONE (ROXICODONE) 5 MG immediate release tablet Take 1 tablet (5 mg total) by mouth every 12 (twelve) hours as needed for Pain. 2/4/25   Gordy Cordero MD   spironolactone (ALDACTONE) 50 MG tablet Take 1 tablet (50 mg total) by mouth once daily. 11/12/24 6/10/25  Kiko Saba MD   medroxyPROGESTERone (PROVERA) 10 MG tablet Take 2 tablets (20 mg total) by mouth 3 (three) times daily. 7/13/22 8/9/22  Yolanda Barriga MD     Anticoagulants/Antiplatelets: no anticoagulation    Allergies:   Review of patient's allergies indicates:   Allergen Reactions    Flecainide Shortness Of Breath and Swelling    Shellfish containing products Other (See Comments)     Makes gout terrible    Vancomycin analogues Hives, Itching and Rash     Sedation History:  no adverse reactions    Review of Systems:   Hematological: no known coagulopathies  Respiratory: no shortness of breath  Cardiovascular: no chest pain  Gastrointestinal: no abdominal pain  Genito-Urinary: no dysuria  Musculoskeletal: negative  Neurological: no TIA or stroke symptoms         OBJECTIVE:     Vital Signs (Most Recent)  Pulse: (!) 56 (02/28/25 0823)  Resp: 18 (02/28/25 0823)  BP: (!) 153/67 (02/28/25 0823)  SpO2: 100 % (02/28/25 0823)    Physical Exam:  ASA: 2  Mallampati: n/a    General: no acute distress  Mental Status: alert and oriented to person, place and time  HEENT: normocephalic, atraumatic  Chest: unlabored breathing  Heart: regular heart rate  Abdomen: distended  Extremity: moves all extremities    ASSESSMENT/PLAN:     Sedation Plan: local  Patient will undergo ultrasound guided  paracentesis.    SCOUT Wolfe, FNP  Interventional Radiology  [phone] clinic

## 2025-03-03 ENCOUNTER — TELEPHONE (OUTPATIENT)
Dept: HEPATOLOGY | Facility: CLINIC | Age: 60
End: 2025-03-03
Payer: MEDICAID

## 2025-03-03 DIAGNOSIS — K74.60 CIRRHOSIS OF LIVER WITH ASCITES, UNSPECIFIED HEPATIC CIRRHOSIS TYPE: Primary | ICD-10-CM

## 2025-03-03 DIAGNOSIS — R18.8 CIRRHOSIS OF LIVER WITH ASCITES, UNSPECIFIED HEPATIC CIRRHOSIS TYPE: Primary | ICD-10-CM

## 2025-03-05 ENCOUNTER — TELEPHONE (OUTPATIENT)
Dept: INTERVENTIONAL RADIOLOGY/VASCULAR | Facility: CLINIC | Age: 60
End: 2025-03-05
Payer: MEDICAID

## 2025-03-06 DIAGNOSIS — K74.60 CIRRHOSIS OF LIVER WITH ASCITES, UNSPECIFIED HEPATIC CIRRHOSIS TYPE: Primary | Chronic | ICD-10-CM

## 2025-03-06 DIAGNOSIS — R18.8 CIRRHOSIS OF LIVER WITH ASCITES, UNSPECIFIED HEPATIC CIRRHOSIS TYPE: Primary | Chronic | ICD-10-CM

## 2025-03-07 ENCOUNTER — HOSPITAL ENCOUNTER (OUTPATIENT)
Dept: INTERVENTIONAL RADIOLOGY/VASCULAR | Facility: HOSPITAL | Age: 60
Discharge: HOME | End: 2025-03-07
Attending: PHYSICIAN ASSISTANT
Payer: MEDICAID

## 2025-03-07 VITALS
RESPIRATION RATE: 18 BRPM | DIASTOLIC BLOOD PRESSURE: 61 MMHG | SYSTOLIC BLOOD PRESSURE: 121 MMHG | HEART RATE: 56 BPM | OXYGEN SATURATION: 99 %

## 2025-03-07 DIAGNOSIS — K74.60 CIRRHOSIS OF LIVER WITH ASCITES, UNSPECIFIED HEPATIC CIRRHOSIS TYPE: ICD-10-CM

## 2025-03-07 DIAGNOSIS — R18.8 CIRRHOSIS OF LIVER WITH ASCITES, UNSPECIFIED HEPATIC CIRRHOSIS TYPE: ICD-10-CM

## 2025-03-07 PROCEDURE — 49083 ABD PARACENTESIS W/IMAGING: CPT | Mod: ,,, | Performed by: FAMILY MEDICINE

## 2025-03-07 PROCEDURE — C1729 CATH, DRAINAGE: HCPCS

## 2025-03-07 PROCEDURE — 49083 ABD PARACENTESIS W/IMAGING: CPT | Performed by: RADIOLOGY

## 2025-03-07 NOTE — DISCHARGE INSTRUCTIONS
"Please call with any questions or concerns.    Monday through Friday 8:00 am - 4:30 pm  Interventional Radiology Clinic  [phone]    Urgent concerns after hours/weekends  Ask the  for the "Interventional Radiologist on call"  [phone]    "

## 2025-03-07 NOTE — SEDATION DOCUMENTATION
Pt arrived to U 2 for paracentesis.  Name verified using two identifiers.  Allergies verified. .  Will continue to monitor.

## 2025-03-07 NOTE — SEDATION DOCUMENTATION
Paracentesis complete. 3900 mLs peritoneal fluid drained. Pt tolerated well. Dressing to left abd clean, dry, and intact. Pt ambulated back to lobby with staff.

## 2025-03-07 NOTE — H&P
Radiology History & Physical      SUBJECTIVE:     Chief Complaint: abdominal distention    History of Present Illness:  Vicky Alvarado is a 59 y.o. female who presents for ultrasound guided paracentesis  Past Medical History:   Diagnosis Date    Anticoagulant long-term use     Arthritis     Atrial fibrillation     cardioversion    Atrial fibrillation with RVR     HAS WATCHMAN IN PLACE NOW    Avascular necrosis     L hand    Cellulitis of extremity 05/07/2022    CHF (congestive heart failure)     Chronic fatigue     Cirrhosis of liver with ascites     Depression     Diabetes mellitus     Encounter for blood transfusion 07/22/2020    Encounter for blood transfusion 03/2022    Fatty liver     GERD (gastroesophageal reflux disease)     Hx of psychiatric care     Hypertension     Iron deficiency anemia 05/07/2022    TIBC 444 with saturated iron 8    Left leg cellulitis 05/07/2022    Liver disease     Obese     Pre-diabetes 05/07/2022    Psychiatric problem     Sleep apnea     wears cpap    SOB (shortness of breath) on exertion     Weight loss     75lb intentional weight loss     Past Surgical History:   Procedure Laterality Date    ABLATION OF ARRHYTHMOGENIC FOCUS FOR ATRIAL FIBRILLATION N/A 07/20/2020    Procedure: Ablation atrial fibrillation;  Surgeon: Gabriel Hawley MD;  Location: [facility] EP LAB;  Service: Cardiology;  Laterality: N/A;  afib, PVI, RFA, REENA, SHADY, anes, MB, 3 Prep    ABLATION OF ARRHYTHMOGENIC FOCUS FOR ATRIAL FIBRILLATION N/A 01/24/2022    Procedure: Ablation atrial fibrillation;  Surgeon: Gabriel Hawley MD;  Location: [facility] EP LAB;  Service: Cardiology;  Laterality: N/A;  afib/afl, PVI (re-do)/CTI, RFA, REENA (cx if SR), SHADY, anes, MB, 3 Prep    APPLICATION OF WOUND VACUUM-ASSISTED CLOSURE DEVICE Left 08/03/2020    Procedure: APPLICATION, WOUND VAC;  Surgeon: SEMAJ Márquez III, MD;  Location: [facility] OR [address] [place] [address];  Service: Peripheral Vascular;  Laterality: Left;    APPLICATION OF WOUND  VACUUM-ASSISTED CLOSURE DEVICE Left 08/06/2020    Procedure: APPLICATION, WOUND VAC;  Surgeon: SEMAJ Márquez III, MD;  Location: [facility] OR 2ND FLR;  Service: Peripheral Vascular;  Laterality: Left;    CARPAL TUNNEL RELEASE Right 06/10/2020    Procedure: RELEASE, CARPAL TUNNEL - RIGHT;  Surgeon: Adelaida Hall MD;  Location: [facility] OR;  Service: Orthopedics;  Laterality: Right;  GENERAL AND REGIONAL    CARPAL TUNNEL RELEASE Left 05/05/2021    Procedure: RELEASE, CARPAL TUNNEL, LEFT;  Surgeon: Adelaida Hall MD;  Location: [facility] OR;  Service: Orthopedics;  Laterality: Left;  GENERAL & REGIONAL IN PACU    CARPECTOMY Left 09/06/2023    Procedure: CARPECTOMY;  Surgeon: Adelaida Hall MD;  Location: [facility] OR;  Service: Orthopedics;  Laterality: Left;  MAC/REGIONAL  LEFT PROXIMAL ROW    CLOSURE OF WOUND Left 08/06/2020    Procedure: CLOSURE, WOUND;  Surgeon: SEMAJ Márquez III, MD;  Location: [facility] OR 2ND FLR;  Service: Peripheral Vascular;  Laterality: Left;  Complex    COLONOSCOPY N/A 08/17/2021    Procedure: COLONOSCOPY Suprep;  Surgeon: Loco Deluca MD;  Location: [facility];  Service: Endoscopy;  Laterality: N/A;    ECHOCARDIOGRAM,TRANSESOPHAGEAL N/A 07/24/2023    Procedure: Transesophageal echo (REENA) intra-procedure log documentation;  Surgeon: Leyla Diagnostic Provider;  Location: [facility] EP LAB;  Service: Cardiology;  Laterality: N/A;  s/p WM, REENA, anes, 3 Prep    ESOPHAGOGASTRODUODENOSCOPY N/A 08/17/2021    Procedure: EGD (ESOPHAGOGASTRODUODENOSCOPY);  Surgeon: Loco Deluca MD;  Location: [facility] ENDO;  Service: Endoscopy;  Laterality: N/A;  Patient is schedule to have her Covid test on 08/14/2021 at the [facility] @ 9:30am. [place].    EXPLORATION OF FEMORAL ARTERY Left 07/21/2020    Procedure: EXPLORATION, ARTERY, FEMORAL;  Surgeon: SEMAJ Márquez III, MD;  Location: [facility] OR 2ND FLR;  Service: Peripheral Vascular;  Laterality: Left;  1. Urgent Direct repair, L SFA branch  laceration    FOOT SURGERY      HYSTEROSCOPY WITH DILATION AND CURETTAGE OF UTERUS N/A 02/19/2022    Procedure: HYSTEROSCOPY, WITH DILATION AND CURETTAGE OF UTERUS;  Surgeon: Shane Palomo MD;  Location: [address];  Service: OB/GYN;  Laterality: N/A;    INCISION AND DRAINAGE OF KNEE Left 05/12/2022    Procedure: INCISION AND DRAINAGE, Prepatellar bursectomy.;  Surgeon: Dre Guzmán MD;  [address];  Service: Orthopedics;  Laterality: Left;    LEFT HEART CATHETERIZATION Left 02/07/2023    Procedure: Left heart cath;  Surgeon: Josiah Terry MD;  Location: [facility] CATH LAB;  Service: Cardiology;  Laterality: Left;  borderline moderate bleeding risk 6.3%    OCCLUSION OF LEFT ATRIAL APPENDAGE N/A 06/12/2023    Procedure: Left atrial appendage occlusion;  Surgeon: Gabriel Hawley MD;  Location: [facility] EP LAB;  Service: Cardiology;  Laterality: N/A;  afib, watchman, BSCI, demi, ALIYA ibarra, 3prep    RELEASE OF ULNAR NERVE AT CUBITAL TUNNEL Bilateral     RIGHT HEART CATHETERIZATION Right 08/05/2024    Procedure: INSERTION, CATHETER, RIGHT HEART;  Surgeon: Zane Liu MD;  Location: [facility] CATH LAB;  Service: Cardiology;  Laterality: Right;    ROBOT-ASSISTED LAPAROSCOPIC ABDOMINAL HYSTERECTOMY USING DA KEIRY XI N/A 08/09/2022    Procedure: XI ROBOTIC HYSTERECTOMY;  Surgeon: Yolanda Barriga MD;  Location: [facility];  Service: OB/GYN;  Laterality: N/A;  dual console requested    ROBOT-ASSISTED LAPAROSCOPIC SALPINGO-OOPHORECTOMY Bilateral 08/09/2022    Procedure: ROBOTIC SALPINGO-OOPHORECTOMY;  Surgeon: Yolanda Barriga MD;  Location: [facility];  Service: OB/GYN;  Laterality: Bilateral;    TRANSESOPHAGEAL ECHOCARDIOGRAPHY N/A 06/12/2023    Procedure: ECHOCARDIOGRAM, TRANSESOPHAGEAL;  Surgeon: Cyril Mast MD;  Location: [facility] EP LAB;  Service: Cardiology;  Laterality: N/A;    TREATMENT OF CARDIAC ARRHYTHMIA N/A 01/29/2020    Procedure: CARDIOVERSION;  Surgeon: Gabriel Hawley MD;  Location:  [facility] EP LAB;  Service: Cardiology;  Laterality: N/A;  af, demi, dccv, anes, mb, 345    TREATMENT OF CARDIAC ARRHYTHMIA  01/24/2022    Procedure: Cardioversion or Defibrillation;  Surgeon: Gabriel Hawley MD;  Location: [facility] EP LAB;  Service: Cardiology;;    WOUND DEBRIDEMENT Left 08/06/2020    Procedure: DEBRIDEMENT, WOUND;  Surgeon: SEMAJ Márquez III, MD;  Location: [facility] OR [address] [place] [address];  Service: Peripheral Vascular;  Laterality: Left;       Home Meds:   Prior to Admission medications    Medication Sig Start Date End Date Taking? Authorizing Provider   albuterol (VENTOLIN HFA) 90 mcg/actuation inhaler Inhale 2 puffs into the lungs every 6 (six) hours as needed for Wheezing. Rescue 1/8/25 1/8/26  Gordy Cordero MD   ALPRAZolam (XANAX) 0.5 MG tablet Take 1 tablet (0.5 mg total) by mouth 2 (two) times daily as needed for Anxiety. 2/4/25   Gordy Cordero MD   b complex vitamins capsule Take 1 capsule by mouth every other day.    Provider, Historical   DULoxetine (CYMBALTA) 60 MG capsule Take 1 capsule (60 mg total) by mouth once daily. 8/12/24   Brennon Nunez MD   ferrous sulfate 325 (65 FE) MG EC tablet Take 1 tablet (325 mg total) by mouth every other day. 9/18/24   Eugene Woodward MD   furosemide (LASIX) 20 MG tablet Take 1 tablet (20 mg total) by mouth once daily. 2/5/25 2/5/26  Gordy Cordero MD   ibuprofen (ADVIL,MOTRIN) 600 MG tablet Take 1 tablet (600 mg total) by mouth 2 (two) times daily as needed for Pain. 2/4/25   Gordy Cordero MD   lactulose (CHRONULAC) 10 gram/15 mL solution Take 30 mLs (20 g total) by mouth 3 (three) times daily. Please ensure you are having at least 2-3 bowel movements every day. 1/14/25   Kiko Saba MD   metoprolol succinate (TOPROL-XL) 100 MG 24 hr tablet Take 1 tablet (100 mg total) by mouth 2 (two) times daily. 2/5/25 2/5/26  Marah Hunter, NP   multivitamin (THERAGRAN) per tablet Take 1 tablet by mouth once daily.    Provider, Historical   nystatin  (MYCOSTATIN) powder Apply topically 2 (two) times daily. 2/14/25   Gordy Cordero MD   omeprazole (PRILOSEC) 20 MG capsule TAKE 1 CAPSULE BY MOUTH ONCE DAILY 7/25/24   Gordy Cordero MD   ondansetron (ZOFRAN-ODT) 4 MG TbDL Take 2 tablets (8 mg total) by mouth every 8 (eight) hours as needed (nasuea, vomiting). 1/8/25   Gordy Cordero MD   oxyCODONE (ROXICODONE) 5 MG immediate release tablet Take 1 tablet (5 mg total) by mouth every 12 (twelve) hours as needed for Pain. 2/4/25   Gordy Cordero MD   spironolactone (ALDACTONE) 50 MG tablet Take 1 tablet (50 mg total) by mouth once daily. 11/12/24 6/10/25  Kiko Saba MD   medroxyPROGESTERone (PROVERA) 10 MG tablet Take 2 tablets (20 mg total) by mouth 3 (three) times daily. 7/13/22 8/9/22  Yolanda Barriga MD     Anticoagulants/Antiplatelets: no anticoagulation    Allergies:   Review of patient's allergies indicates:   Allergen Reactions    Flecainide Shortness Of Breath and Swelling    Shellfish containing products Other (See Comments)     Makes gout terrible    Vancomycin analogues Hives, Itching and Rash     Sedation History:  no adverse reactions    Review of Systems:   Hematological: no known coagulopathies  Respiratory: no shortness of breath  Cardiovascular: no chest pain  Gastrointestinal: no abdominal pain  Genito-Urinary: no dysuria  Musculoskeletal: negative  Neurological: no TIA or stroke symptoms         OBJECTIVE:     Vital Signs (Most Recent)  Pulse: (!) 54 (03/07/25 0822)  Resp: 18 (03/07/25 0822)  BP: (!) 150/70 (03/07/25 0822)  SpO2: 100 % (03/07/25 0822)    Physical Exam:  ASA: 2  Mallampati: n/a    General: no acute distress  Mental Status: alert and oriented to person, place and time  HEENT: normocephalic, atraumatic  Chest: unlabored breathing  Heart: regular heart rate  Abdomen: distended  Extremity: moves all extremities    ASSESSMENT/PLAN:     Sedation Plan: local  Patient will undergo ultrasound guided  paracentesis.    SCOUT Wolfe, FNP  Interventional Radiology  [phone] clinic

## 2025-03-07 NOTE — PROCEDURES
Radiology Post-Procedure Note    Pre Op Diagnosis: Ascites  Post Op Diagnosis: Same    Procedure: Ultrasound Guided Paracentesis    Procedure performed by: Jannet CUNNINGHAM, Samantha     Written Informed Consent Obtained: Yes  Specimen Removed: YES serous  Estimated Blood Loss: Minimal    Findings:   Successful LLQ paracentesis.  Albumin administered PRN per protocol.    Patient tolerated procedure well.    Samantha Power, APRN, FNP  Interventional Radiology  [phone] clinic

## 2025-03-09 RX ORDER — TRAZODONE HYDROCHLORIDE 100 MG/1
100 TABLET ORAL NIGHTLY PRN
Qty: 90 TABLET | Refills: 3 | Status: SHIPPED | OUTPATIENT
Start: 2025-03-09

## 2025-03-10 ENCOUNTER — RESULTS FOLLOW-UP (OUTPATIENT)
Dept: ELECTROPHYSIOLOGY | Facility: CLINIC | Age: 60
End: 2025-03-10

## 2025-03-14 ENCOUNTER — HOSPITAL ENCOUNTER (OUTPATIENT)
Dept: INTERVENTIONAL RADIOLOGY/VASCULAR | Facility: HOSPITAL | Age: 60
Discharge: HOME | End: 2025-03-14
Attending: INTERNAL MEDICINE
Payer: MEDICAID

## 2025-03-14 VITALS
RESPIRATION RATE: 17 BRPM | HEART RATE: 58 BPM | SYSTOLIC BLOOD PRESSURE: 115 MMHG | OXYGEN SATURATION: 98 % | DIASTOLIC BLOOD PRESSURE: 66 MMHG

## 2025-03-14 DIAGNOSIS — K74.60 CIRRHOSIS OF LIVER WITH ASCITES, UNSPECIFIED HEPATIC CIRRHOSIS TYPE: Chronic | ICD-10-CM

## 2025-03-14 DIAGNOSIS — R18.8 OTHER ASCITES: Chronic | ICD-10-CM

## 2025-03-14 DIAGNOSIS — R18.8 CIRRHOSIS OF LIVER WITH ASCITES, UNSPECIFIED HEPATIC CIRRHOSIS TYPE: Chronic | ICD-10-CM

## 2025-03-14 PROCEDURE — 49083 ABD PARACENTESIS W/IMAGING: CPT | Mod: ,,, | Performed by: FAMILY MEDICINE

## 2025-03-14 PROCEDURE — 49083 ABD PARACENTESIS W/IMAGING: CPT | Performed by: STUDENT IN AN ORGANIZED HEALTH CARE EDUCATION/TRAINING PROGRAM

## 2025-03-14 PROCEDURE — C1729 CATH, DRAINAGE: HCPCS

## 2025-03-14 NOTE — DISCHARGE INSTRUCTIONS
You may remove the dressing over your procedure site 24 hours after your procedure. You may shower 24 hours after your procedure. Keep the procedure site clean, dry and open to air. Do not submerge in water (bath, pool, hot tub, ect.) until the site is completely healed.     Please call with any questions or concerns.      Monday thru Friday 8:00 am - 4:30 pm    Interventional Radiology   [phone]    After Hours    Ask for the Radiology Intern on call  [phone]

## 2025-03-14 NOTE — H&P
Radiology History & Physical      SUBJECTIVE:     Chief Complaint: abdominal distention    History of Present Illness:  Vicky Alvarado is a 59 y.o. female who presents for ultrasound guided paracentesis  Past Medical History:   Diagnosis Date    Anticoagulant long-term use     Arthritis     Atrial fibrillation     cardioversion    Atrial fibrillation with RVR     HAS WATCHMAN IN PLACE NOW    Avascular necrosis     L hand    Cellulitis of extremity 05/07/2022    CHF (congestive heart failure)     Chronic fatigue     Cirrhosis of liver with ascites     Depression     Diabetes mellitus     Encounter for blood transfusion 07/22/2020    Encounter for blood transfusion 03/2022    Fatty liver     GERD (gastroesophageal reflux disease)     Hx of psychiatric care     Hypertension     Iron deficiency anemia 05/07/2022    TIBC 444 with saturated iron 8    Left leg cellulitis 05/07/2022    Liver disease     Obese     Pre-diabetes 05/07/2022    Psychiatric problem     Sleep apnea     wears cpap    SOB (shortness of breath) on exertion     Weight loss     75lb intentional weight loss     Past Surgical History:   Procedure Laterality Date    ABLATION OF ARRHYTHMOGENIC FOCUS FOR ATRIAL FIBRILLATION N/A 07/20/2020    Procedure: Ablation atrial fibrillation;  Surgeon: Gabriel Hawley MD;  Location: [facility] EP LAB;  Service: Cardiology;  Laterality: N/A;  afib, PVI, RFA, REENA, SHADY, anes, MB, 3 Prep    ABLATION OF ARRHYTHMOGENIC FOCUS FOR ATRIAL FIBRILLATION N/A 01/24/2022    Procedure: Ablation atrial fibrillation;  Surgeon: Gabriel Hawley MD;  Location: [facility] EP LAB;  Service: Cardiology;  Laterality: N/A;  afib/afl, PVI (re-do)/CTI, RFA, REENA (cx if SR), SHADY, anes, MB, 3 Prep    APPLICATION OF WOUND VACUUM-ASSISTED CLOSURE DEVICE Left 08/03/2020    Procedure: APPLICATION, WOUND VAC;  Surgeon: SEMAJ Márquez III, MD;  Location: [facility] OR [address] [place] [address];  Service: Peripheral Vascular;  Laterality: Left;    APPLICATION OF WOUND  VACUUM-ASSISTED CLOSURE DEVICE Left 08/06/2020    Procedure: APPLICATION, WOUND VAC;  Surgeon: SEMAJ Márquez III, MD;  Location: [facility] OR 2ND FLR;  Service: Peripheral Vascular;  Laterality: Left;    CARPAL TUNNEL RELEASE Right 06/10/2020    Procedure: RELEASE, CARPAL TUNNEL - RIGHT;  Surgeon: Adelaida Hall MD;  Location: [facility] OR;  Service: Orthopedics;  Laterality: Right;  GENERAL AND REGIONAL    CARPAL TUNNEL RELEASE Left 05/05/2021    Procedure: RELEASE, CARPAL TUNNEL, LEFT;  Surgeon: Adelaida Hall MD;  Location: [facility] OR;  Service: Orthopedics;  Laterality: Left;  GENERAL & REGIONAL IN PACU    CARPECTOMY Left 09/06/2023    Procedure: CARPECTOMY;  Surgeon: Adelaida Hall MD;  Location: [facility] OR;  Service: Orthopedics;  Laterality: Left;  MAC/REGIONAL  LEFT PROXIMAL ROW    CLOSURE OF WOUND Left 08/06/2020    Procedure: CLOSURE, WOUND;  Surgeon: SEMAJ Márquez III, MD;  Location: [facility] OR 2ND FLR;  Service: Peripheral Vascular;  Laterality: Left;  Complex    COLONOSCOPY N/A 08/17/2021    Procedure: COLONOSCOPY Suprep;  Surgeon: Loco Deluca MD;  Location: [facility];  Service: Endoscopy;  Laterality: N/A;    ECHOCARDIOGRAM,TRANSESOPHAGEAL N/A 07/24/2023    Procedure: Transesophageal echo (REENA) intra-procedure log documentation;  Surgeon: Leyla Diagnostic Provider;  Location: [facility] EP LAB;  Service: Cardiology;  Laterality: N/A;  s/p WM, REENA, anes, 3 Prep    ESOPHAGOGASTRODUODENOSCOPY N/A 08/17/2021    Procedure: EGD (ESOPHAGOGASTRODUODENOSCOPY);  Surgeon: Loco Deluca MD;  Location: [facility] ENDO;  Service: Endoscopy;  Laterality: N/A;  Patient is schedule to have her Covid test on 08/14/2021 at the [facility] @ 9:30am. [place].    EXPLORATION OF FEMORAL ARTERY Left 07/21/2020    Procedure: EXPLORATION, ARTERY, FEMORAL;  Surgeon: SEMAJ Márquez III, MD;  Location: [facility] OR 2ND FLR;  Service: Peripheral Vascular;  Laterality: Left;  1. Urgent Direct repair, L SFA branch  laceration    FOOT SURGERY      HYSTEROSCOPY WITH DILATION AND CURETTAGE OF UTERUS N/A 02/19/2022    Procedure: HYSTEROSCOPY, WITH DILATION AND CURETTAGE OF UTERUS;  Surgeon: Shane Palomo MD;  Location: [address];  Service: OB/GYN;  Laterality: N/A;    INCISION AND DRAINAGE OF KNEE Left 05/12/2022    Procedure: INCISION AND DRAINAGE, Prepatellar bursectomy.;  Surgeon: Dre Guzmán MD;  [address];  Service: Orthopedics;  Laterality: Left;    LEFT HEART CATHETERIZATION Left 02/07/2023    Procedure: Left heart cath;  Surgeon: Josiah Terry MD;  Location: [facility] CATH LAB;  Service: Cardiology;  Laterality: Left;  borderline moderate bleeding risk 6.3%    OCCLUSION OF LEFT ATRIAL APPENDAGE N/A 06/12/2023    Procedure: Left atrial appendage occlusion;  Surgeon: Gabriel Hawley MD;  Location: [facility] EP LAB;  Service: Cardiology;  Laterality: N/A;  afib, watchman, BSCI, demi, ALIYA ibarra, 3prep    RELEASE OF ULNAR NERVE AT CUBITAL TUNNEL Bilateral     RIGHT HEART CATHETERIZATION Right 08/05/2024    Procedure: INSERTION, CATHETER, RIGHT HEART;  Surgeon: Zane Liu MD;  Location: [facility] CATH LAB;  Service: Cardiology;  Laterality: Right;    ROBOT-ASSISTED LAPAROSCOPIC ABDOMINAL HYSTERECTOMY USING DA KEIRY XI N/A 08/09/2022    Procedure: XI ROBOTIC HYSTERECTOMY;  Surgeon: Yolanda Barriga MD;  Location: [facility];  Service: OB/GYN;  Laterality: N/A;  dual console requested    ROBOT-ASSISTED LAPAROSCOPIC SALPINGO-OOPHORECTOMY Bilateral 08/09/2022    Procedure: ROBOTIC SALPINGO-OOPHORECTOMY;  Surgeon: Yolanda Barriga MD;  Location: [facility];  Service: OB/GYN;  Laterality: Bilateral;    TRANSESOPHAGEAL ECHOCARDIOGRAPHY N/A 06/12/2023    Procedure: ECHOCARDIOGRAM, TRANSESOPHAGEAL;  Surgeon: Cyril Mast MD;  Location: [facility] EP LAB;  Service: Cardiology;  Laterality: N/A;    TREATMENT OF CARDIAC ARRHYTHMIA N/A 01/29/2020    Procedure: CARDIOVERSION;  Surgeon: Gabriel Hawley MD;  Location:  [facility] EP LAB;  Service: Cardiology;  Laterality: N/A;  af, demi, dccv, anes, mb, 345    TREATMENT OF CARDIAC ARRHYTHMIA  01/24/2022    Procedure: Cardioversion or Defibrillation;  Surgeon: Gabriel Hawley MD;  Location: [facility] EP LAB;  Service: Cardiology;;    WOUND DEBRIDEMENT Left 08/06/2020    Procedure: DEBRIDEMENT, WOUND;  Surgeon: SEMAJ Márquez III, MD;  Location: [facility] OR [address] [place] [address];  Service: Peripheral Vascular;  Laterality: Left;       Home Meds:   Prior to Admission medications    Medication Sig Start Date End Date Taking? Authorizing Provider   albuterol (VENTOLIN HFA) 90 mcg/actuation inhaler Inhale 2 puffs into the lungs every 6 (six) hours as needed for Wheezing. Rescue 1/8/25 1/8/26  Gordy Cordero MD   ALPRAZolam (XANAX) 0.5 MG tablet Take 1 tablet (0.5 mg total) by mouth 2 (two) times daily as needed for Anxiety. 2/4/25   Gordy Cordero MD   b complex vitamins capsule Take 1 capsule by mouth every other day.    Provider, Historical   DULoxetine (CYMBALTA) 60 MG capsule Take 1 capsule (60 mg total) by mouth once daily. 8/12/24   Brennon Nunez MD   ferrous sulfate 325 (65 FE) MG EC tablet Take 1 tablet (325 mg total) by mouth every other day. 9/18/24   Eugene Woodward MD   furosemide (LASIX) 20 MG tablet Take 1 tablet (20 mg total) by mouth once daily. 2/5/25 2/5/26  Gordy Cordero MD   ibuprofen (ADVIL,MOTRIN) 600 MG tablet Take 1 tablet (600 mg total) by mouth 2 (two) times daily as needed for Pain. 2/4/25   Gordy Cordero MD   lactulose (CHRONULAC) 10 gram/15 mL solution Take 30 mLs (20 g total) by mouth 3 (three) times daily. Please ensure you are having at least 2-3 bowel movements every day. 1/14/25   Kiko Saba MD   metoprolol succinate (TOPROL-XL) 100 MG 24 hr tablet Take 1 tablet (100 mg total) by mouth 2 (two) times daily. 2/5/25 2/5/26  Marah Hunter, NP   multivitamin (THERAGRAN) per tablet Take 1 tablet by mouth once daily.    Provider, Historical   nystatin  (MYCOSTATIN) powder Apply topically 2 (two) times daily. 2/14/25   Gordy Cordero MD   omeprazole (PRILOSEC) 20 MG capsule TAKE 1 CAPSULE BY MOUTH ONCE DAILY 7/25/24   Gordy Cordero MD   ondansetron (ZOFRAN-ODT) 4 MG TbDL Take 2 tablets (8 mg total) by mouth every 8 (eight) hours as needed (nasuea, vomiting). 3/9/25   Gordy Cordero MD   oxyCODONE (ROXICODONE) 5 MG immediate release tablet Take 1 tablet (5 mg total) by mouth every 12 (twelve) hours as needed for Pain. 3/9/25   Gordy Cordero MD   spironolactone (ALDACTONE) 50 MG tablet Take 1 tablet (50 mg total) by mouth once daily. 11/12/24 6/10/25  Kiko Saba MD   traZODone (DESYREL) 100 MG tablet Take 100 mg by mouth every evening.    Wilson Jimenez   traZODone (DESYREL) 100 MG tablet Take 1 tablet (100 mg total) by mouth nightly as needed for Insomnia. 3/9/25   Gordy Cordero MD   medroxyPROGESTERone (PROVERA) 10 MG tablet Take 2 tablets (20 mg total) by mouth 3 (three) times daily. 7/13/22 8/9/22  Yolanda Barriga MD     Anticoagulants/Antiplatelets: no anticoagulation    Allergies:   Review of patient's allergies indicates:   Allergen Reactions    Flecainide Shortness Of Breath and Swelling    Shellfish containing products Other (See Comments)     Makes gout terrible    Vancomycin analogues Hives, Itching and Rash     Sedation History:  no adverse reactions    Review of Systems:   Hematological: no known coagulopathies  Respiratory: no shortness of breath  Cardiovascular: no chest pain  Gastrointestinal: no abdominal pain  Genito-Urinary: no dysuria  Musculoskeletal: negative  Neurological: no TIA or stroke symptoms         OBJECTIVE:     Vital Signs (Most Recent)  Pulse: 65 (03/14/25 0831)  Resp: 16 (03/14/25 0831)  BP: 133/68 (03/14/25 0831)  SpO2: 100 % (03/14/25 0831)    Physical Exam:  ASA: 2  Mallampati: n/a    General: no acute distress  Mental Status: alert and oriented to person, place and time  HEENT: normocephalic,  atraumatic  Chest: unlabored breathing  Heart: regular heart rate  Abdomen: distended  Extremity: moves all extremities    ASSESSMENT/PLAN:     Sedation Plan: local  Patient will undergo ultrasound guided paracentesis.    SCOUT Wolfe, FNP  Interventional Radiology  [phone] clinic

## 2025-03-14 NOTE — PLAN OF CARE
Paracentesis completed, pt tolerated well. No apparent distress noted. 2.9 Liters removed from ascites, mepore applied CDI. Labs collected and sent. Discharge instructions reviewed and acknowledged. Pt discharged via walker and private vehicle.

## 2025-03-14 NOTE — PROCEDURES
Radiology Post-Procedure Note    Pre Op Diagnosis: Ascites  Post Op Diagnosis: Same    Procedure: Ultrasound Guided Paracentesis    Procedure performed by: Jannet CUNNINGHAM, Samantha     Written Informed Consent Obtained: Yes  Specimen Removed: YES serous  Estimated Blood Loss: Minimal    Findings:   Successful LLQ paracentesis.  Albumin administered PRN per protocol.    Patient tolerated procedure well.    Samantha Power, APRN, FNP  Interventional Radiology  [phone] clinic

## 2025-03-14 NOTE — PLAN OF CARE
Pt arrived to MPU room 2 for paracentetics, no acute distress noted. Orders and labs reviewed on chart. Awaiting consent.

## 2025-03-21 ENCOUNTER — HOSPITAL ENCOUNTER (OUTPATIENT)
Dept: INTERVENTIONAL RADIOLOGY/VASCULAR | Facility: HOSPITAL | Age: 60
Discharge: HOME | End: 2025-03-21
Attending: INTERNAL MEDICINE
Payer: MEDICAID

## 2025-03-21 VITALS
OXYGEN SATURATION: 100 % | HEART RATE: 53 BPM | DIASTOLIC BLOOD PRESSURE: 70 MMHG | SYSTOLIC BLOOD PRESSURE: 122 MMHG | RESPIRATION RATE: 18 BRPM

## 2025-03-21 DIAGNOSIS — K74.60 CIRRHOSIS OF LIVER WITH ASCITES, UNSPECIFIED HEPATIC CIRRHOSIS TYPE: Chronic | ICD-10-CM

## 2025-03-21 DIAGNOSIS — R18.8 CIRRHOSIS OF LIVER WITH ASCITES, UNSPECIFIED HEPATIC CIRRHOSIS TYPE: Chronic | ICD-10-CM

## 2025-03-21 PROCEDURE — 49083 ABD PARACENTESIS W/IMAGING: CPT | Mod: ,,, | Performed by: FAMILY MEDICINE

## 2025-03-21 PROCEDURE — C1729 CATH, DRAINAGE: HCPCS

## 2025-03-21 PROCEDURE — 49083 ABD PARACENTESIS W/IMAGING: CPT | Performed by: RADIOLOGY

## 2025-03-21 NOTE — H&P
Radiology History & Physical      SUBJECTIVE:     Chief Complaint: abdominal distention    History of Present Illness:  Vicky Alvarado is a 59 y.o. female who presents for ultrasound guided paracentesis  Past Medical History:   Diagnosis Date    Anticoagulant long-term use     Arthritis     Atrial fibrillation     cardioversion    Atrial fibrillation with RVR     HAS WATCHMAN IN PLACE NOW    Avascular necrosis     L hand    Cellulitis of extremity 05/07/2022    CHF (congestive heart failure)     Chronic fatigue     Cirrhosis of liver with ascites     Depression     Diabetes mellitus     Encounter for blood transfusion 07/22/2020    Encounter for blood transfusion 03/2022    Fatty liver     GERD (gastroesophageal reflux disease)     Hx of psychiatric care     Hypertension     Iron deficiency anemia 05/07/2022    TIBC 444 with saturated iron 8    Left leg cellulitis 05/07/2022    Liver disease     Obese     Pre-diabetes 05/07/2022    Psychiatric problem     Sleep apnea     wears cpap    SOB (shortness of breath) on exertion     Weight loss     75lb intentional weight loss     Past Surgical History:   Procedure Laterality Date    ABLATION OF ARRHYTHMOGENIC FOCUS FOR ATRIAL FIBRILLATION N/A 07/20/2020    Procedure: Ablation atrial fibrillation;  Surgeon: Gabriel Hwaley MD;  Location: [facility] EP LAB;  Service: Cardiology;  Laterality: N/A;  afib, PVI, RFA, REENA, SHADY, anes, MB, 3 Prep    ABLATION OF ARRHYTHMOGENIC FOCUS FOR ATRIAL FIBRILLATION N/A 01/24/2022    Procedure: Ablation atrial fibrillation;  Surgeon: Gabriel Hawley MD;  Location: [facility] EP LAB;  Service: Cardiology;  Laterality: N/A;  afib/afl, PVI (re-do)/CTI, RFA, REENA (cx if SR), SHADY, anes, MB, 3 Prep    APPLICATION OF WOUND VACUUM-ASSISTED CLOSURE DEVICE Left 08/03/2020    Procedure: APPLICATION, WOUND VAC;  Surgeon: SEMAJ Márquez III, MD;  Location: [facility] OR [address] [place] [address];  Service: Peripheral Vascular;  Laterality: Left;    APPLICATION OF WOUND  VACUUM-ASSISTED CLOSURE DEVICE Left 08/06/2020    Procedure: APPLICATION, WOUND VAC;  Surgeon: SEMAJ Márquez III, MD;  Location: [facility] OR 2ND FLR;  Service: Peripheral Vascular;  Laterality: Left;    CARPAL TUNNEL RELEASE Right 06/10/2020    Procedure: RELEASE, CARPAL TUNNEL - RIGHT;  Surgeon: Adelaida Hall MD;  Location: [facility] OR;  Service: Orthopedics;  Laterality: Right;  GENERAL AND REGIONAL    CARPAL TUNNEL RELEASE Left 05/05/2021    Procedure: RELEASE, CARPAL TUNNEL, LEFT;  Surgeon: Adelaida Hall MD;  Location: [facility] OR;  Service: Orthopedics;  Laterality: Left;  GENERAL & REGIONAL IN PACU    CARPECTOMY Left 09/06/2023    Procedure: CARPECTOMY;  Surgeon: Adelaida Hall MD;  Location: [facility] OR;  Service: Orthopedics;  Laterality: Left;  MAC/REGIONAL  LEFT PROXIMAL ROW    CLOSURE OF WOUND Left 08/06/2020    Procedure: CLOSURE, WOUND;  Surgeon: SEMAJ Márquez III, MD;  Location: [facility] OR 2ND FLR;  Service: Peripheral Vascular;  Laterality: Left;  Complex    COLONOSCOPY N/A 08/17/2021    Procedure: COLONOSCOPY Suprep;  Surgeon: Loco Deluca MD;  Location: [facility];  Service: Endoscopy;  Laterality: N/A;    ECHOCARDIOGRAM,TRANSESOPHAGEAL N/A 07/24/2023    Procedure: Transesophageal echo (REENA) intra-procedure log documentation;  Surgeon: Leyla Diagnostic Provider;  Location: [facility] EP LAB;  Service: Cardiology;  Laterality: N/A;  s/p WM, REENA, anes, 3 Prep    ESOPHAGOGASTRODUODENOSCOPY N/A 08/17/2021    Procedure: EGD (ESOPHAGOGASTRODUODENOSCOPY);  Surgeon: Loco Delcua MD;  Location: [facility] ENDO;  Service: Endoscopy;  Laterality: N/A;  Patient is schedule to have her Covid test on 08/14/2021 at the [facility] @ 9:30am. [place].    EXPLORATION OF FEMORAL ARTERY Left 07/21/2020    Procedure: EXPLORATION, ARTERY, FEMORAL;  Surgeon: SEMAJ Márquez III, MD;  Location: [facility] OR 2ND FLR;  Service: Peripheral Vascular;  Laterality: Left;  1. Urgent Direct repair, L SFA branch  laceration    FOOT SURGERY      HYSTEROSCOPY WITH DILATION AND CURETTAGE OF UTERUS N/A 02/19/2022    Procedure: HYSTEROSCOPY, WITH DILATION AND CURETTAGE OF UTERUS;  Surgeon: Shane Palomo MD;  Location: [address];  Service: OB/GYN;  Laterality: N/A;    INCISION AND DRAINAGE OF KNEE Left 05/12/2022    Procedure: INCISION AND DRAINAGE, Prepatellar bursectomy.;  Surgeon: Dre Guzmán MD;  [address];  Service: Orthopedics;  Laterality: Left;    LEFT HEART CATHETERIZATION Left 02/07/2023    Procedure: Left heart cath;  Surgeon: Josiah Terry MD;  Location: [facility] CATH LAB;  Service: Cardiology;  Laterality: Left;  borderline moderate bleeding risk 6.3%    OCCLUSION OF LEFT ATRIAL APPENDAGE N/A 06/12/2023    Procedure: Left atrial appendage occlusion;  Surgeon: Gabriel Hawley MD;  Location: [facility] EP LAB;  Service: Cardiology;  Laterality: N/A;  afib, watchman, BSCI, demi, ALIYA ibarra, 3prep    RELEASE OF ULNAR NERVE AT CUBITAL TUNNEL Bilateral     RIGHT HEART CATHETERIZATION Right 08/05/2024    Procedure: INSERTION, CATHETER, RIGHT HEART;  Surgeon: Zane Liu MD;  Location: [facility] CATH LAB;  Service: Cardiology;  Laterality: Right;    ROBOT-ASSISTED LAPAROSCOPIC ABDOMINAL HYSTERECTOMY USING DA KEIRY XI N/A 08/09/2022    Procedure: XI ROBOTIC HYSTERECTOMY;  Surgeon: Yolanda Barriga MD;  [facility];  Service: OB/GYN;  Laterality: N/A;  dual console requested    ROBOT-ASSISTED LAPAROSCOPIC SALPINGO-OOPHORECTOMY Bilateral 08/09/2022    Procedure: ROBOTIC SALPINGO-OOPHORECTOMY;  Surgeon: Yolanda Barriga MD;  Location: [facility];  Service: OB/GYN;  Laterality: Bilateral;    TRANSESOPHAGEAL ECHOCARDIOGRAPHY N/A 06/12/2023    Procedure: ECHOCARDIOGRAM, TRANSESOPHAGEAL;  Surgeon: Cyril Mast MD;  Location: [facility] EP LAB;  Service: Cardiology;  Laterality: N/A;    TREATMENT OF CARDIAC ARRHYTHMIA N/A 01/29/2020    Procedure: CARDIOVERSION;  Surgeon: Gabriel Hawley MD;  Location:  [facility] EP LAB;  Service: Cardiology;  Laterality: N/A;  af, demi, dccv, anes, mb, 345    TREATMENT OF CARDIAC ARRHYTHMIA  01/24/2022    Procedure: Cardioversion or Defibrillation;  Surgeon: Gabriel Hawley MD;  Location: [facility] EP LAB;  Service: Cardiology;;    WOUND DEBRIDEMENT Left 08/06/2020    Procedure: DEBRIDEMENT, WOUND;  Surgeon: SEMAJ Márquez III, MD;  Location: [facility] OR [address] [place] [address];  Service: Peripheral Vascular;  Laterality: Left;       Home Meds:   Prior to Admission medications    Medication Sig Start Date End Date Taking? Authorizing Provider   albuterol (VENTOLIN HFA) 90 mcg/actuation inhaler Inhale 2 puffs into the lungs every 6 (six) hours as needed for Wheezing. Rescue 1/8/25 1/8/26  Gordy Cordero MD   ALPRAZolam (XANAX) 0.5 MG tablet Take 1 tablet (0.5 mg total) by mouth 2 (two) times daily as needed for Anxiety. 2/4/25   Gordy Cordero MD   b complex vitamins capsule Take 1 capsule by mouth every other day.    Provider, Historical   DULoxetine (CYMBALTA) 60 MG capsule Take 1 capsule (60 mg total) by mouth once daily. 8/12/24   Brennon Nunez MD   ferrous sulfate 325 (65 FE) MG EC tablet Take 1 tablet (325 mg total) by mouth every other day. 9/18/24   Eugene Woodward MD   furosemide (LASIX) 20 MG tablet Take 1 tablet (20 mg total) by mouth once daily. 2/5/25 2/5/26  Gordy Cordero MD   ibuprofen (ADVIL,MOTRIN) 600 MG tablet Take 1 tablet (600 mg total) by mouth 2 (two) times daily as needed for Pain. 2/4/25   Gordy Cordero MD   lactulose (CHRONULAC) 10 gram/15 mL solution Take 30 mLs (20 g total) by mouth 3 (three) times daily. Please ensure you are having at least 2-3 bowel movements every day. 1/14/25   Kiko Saba MD   metoprolol succinate (TOPROL-XL) 100 MG 24 hr tablet Take 1 tablet (100 mg total) by mouth 2 (two) times daily. 2/5/25 2/5/26  Marah Hunter, NP   multivitamin (THERAGRAN) per tablet Take 1 tablet by mouth once daily.    Provider, Historical   nystatin  (MYCOSTATIN) powder Apply topically 2 (two) times daily. 2/14/25   Gordy Cordero MD   omeprazole (PRILOSEC) 20 MG capsule TAKE 1 CAPSULE BY MOUTH ONCE DAILY 7/25/24   Gordy Cordero MD   ondansetron (ZOFRAN-ODT) 4 MG TbDL Take 2 tablets (8 mg total) by mouth every 8 (eight) hours as needed (nasuea, vomiting). 3/9/25   Gordy Cordero MD   oxyCODONE (ROXICODONE) 5 MG immediate release tablet Take 1 tablet (5 mg total) by mouth every 12 (twelve) hours as needed for Pain. 3/9/25   Gordy Cordero MD   spironolactone (ALDACTONE) 50 MG tablet Take 1 tablet (50 mg total) by mouth once daily. 11/12/24 6/10/25  Kiko Saba MD   traZODone (DESYREL) 100 MG tablet Take 100 mg by mouth every evening.    Wilson Jimenez   traZODone (DESYREL) 100 MG tablet Take 1 tablet (100 mg total) by mouth nightly as needed for Insomnia. 3/9/25   Gordy Cordero MD   medroxyPROGESTERone (PROVERA) 10 MG tablet Take 2 tablets (20 mg total) by mouth 3 (three) times daily. 7/13/22 8/9/22  Yolanda Barriga MD     Anticoagulants/Antiplatelets: no anticoagulation    Allergies:   Review of patient's allergies indicates:   Allergen Reactions    Flecainide Shortness Of Breath and Swelling    Shellfish containing products Other (See Comments)     Makes gout terrible    Vancomycin analogues Hives, Itching and Rash     Sedation History:  no adverse reactions    Review of Systems:   Hematological: no known coagulopathies  Respiratory: no shortness of breath  Cardiovascular: no chest pain  Gastrointestinal: no abdominal pain  Genito-Urinary: no dysuria  Musculoskeletal: negative  Neurological: no TIA or stroke symptoms         OBJECTIVE:     Vital Signs (Most Recent)  Pulse: (!) 57 (03/21/25 0808)  Resp: 18 (03/21/25 0808)  BP: (!) 143/74 (03/21/25 0808)  SpO2: 100 % (03/21/25 0808)    Physical Exam:  ASA: 2  Mallampati: n/a    General: no acute distress  Mental Status: alert and oriented to person, place and time  HEENT:  normocephalic, atraumatic  Chest: unlabored breathing  Heart: regular heart rate  Abdomen: distended  Extremity: moves all extremities    ASSESSMENT/PLAN:     Sedation Plan: local  Patient will undergo ultrasound guided paracentesis.    SCOUT Wolfe, FNP  Interventional Radiology  [phone] [facility]

## 2025-03-21 NOTE — SEDATION DOCUMENTATION
Paracentesis complete. 3300 mLs peritoneal fluid drained. Pt tolerated well. Dressing to right abd clean, dry, and intact. Discharge instructions reviewed.  Pt verbalized understanding. Pt discharged

## 2025-03-21 NOTE — PROCEDURES
Radiology Post-Procedure Note    Pre Op Diagnosis: Ascites  Post Op Diagnosis: Same    Procedure: Ultrasound Guided Paracentesis    Procedure performed by: Jannet CUNNINGHAM, Samantha     Written Informed Consent Obtained: Yes  Specimen Removed: YES serous  Estimated Blood Loss: Minimal    Findings:   Successful LLQ paracentesis.  Albumin administered PRN per protocol.    Patient tolerated procedure well.    Samantha Power, APRN, FNP  Interventional Radiology  [phone] clinic

## 2025-04-02 DIAGNOSIS — K76.0 METABOLIC DYSFUNCTION-ASSOCIATED STEATOTIC LIVER DISEASE (MASLD): Chronic | ICD-10-CM

## 2025-04-02 DIAGNOSIS — R18.8 CIRRHOSIS OF LIVER WITH ASCITES, UNSPECIFIED HEPATIC CIRRHOSIS TYPE: Chronic | ICD-10-CM

## 2025-04-02 DIAGNOSIS — K74.60 CIRRHOSIS OF LIVER WITH ASCITES, UNSPECIFIED HEPATIC CIRRHOSIS TYPE: Chronic | ICD-10-CM

## 2025-04-03 RX ORDER — LACTULOSE 10 G/15ML
20 SOLUTION ORAL; RECTAL 3 TIMES DAILY
Qty: 900 ML | Refills: 6 | Status: SHIPPED | OUTPATIENT
Start: 2025-04-03

## 2025-04-04 ENCOUNTER — HOSPITAL ENCOUNTER (OUTPATIENT)
Dept: INTERVENTIONAL RADIOLOGY/VASCULAR | Facility: HOSPITAL | Age: 60
Discharge: HOME | End: 2025-04-04
Attending: INTERNAL MEDICINE
Payer: MEDICAID

## 2025-04-04 VITALS
SYSTOLIC BLOOD PRESSURE: 121 MMHG | OXYGEN SATURATION: 98 % | DIASTOLIC BLOOD PRESSURE: 81 MMHG | RESPIRATION RATE: 18 BRPM | HEART RATE: 80 BPM

## 2025-04-04 DIAGNOSIS — K74.60 CIRRHOSIS OF LIVER WITH ASCITES, UNSPECIFIED HEPATIC CIRRHOSIS TYPE: Chronic | ICD-10-CM

## 2025-04-04 DIAGNOSIS — R18.8 CIRRHOSIS OF LIVER WITH ASCITES, UNSPECIFIED HEPATIC CIRRHOSIS TYPE: Chronic | ICD-10-CM

## 2025-04-04 PROCEDURE — 49083 ABD PARACENTESIS W/IMAGING: CPT | Mod: ,,, | Performed by: FAMILY MEDICINE

## 2025-04-04 PROCEDURE — 49083 ABD PARACENTESIS W/IMAGING: CPT | Performed by: RADIOLOGY

## 2025-04-04 PROCEDURE — C1729 CATH, DRAINAGE: HCPCS

## 2025-04-04 NOTE — PROCEDURES
Radiology Post-Procedure Note    Pre Op Diagnosis: Ascites  Post Op Diagnosis: Same    Procedure: Ultrasound Guided Paracentesis    Procedure performed by: Jannet CUNNINGHAM, Samantha     Written Informed Consent Obtained: Yes  Specimen Removed: YES serous  Estimated Blood Loss: Minimal    Findings:   Successful LLQ paracentesis.  Albumin administered PRN per protocol.    Patient tolerated procedure well.    Samantha Power, APRN, FNP  Interventional Radiology  [phone] clinic

## 2025-04-04 NOTE — H&P
Radiology History & Physical      SUBJECTIVE:     Chief Complaint: abdominal distention    History of Present Illness:  Vicky Alvarado is a 59 y.o. female who presents for ultrasound guided paracentesis  Past Medical History:   Diagnosis Date    Anticoagulant long-term use     Arthritis     Atrial fibrillation     cardioversion    Atrial fibrillation with RVR     HAS WATCHMAN IN PLACE NOW    Avascular necrosis     L hand    Cellulitis of extremity 05/07/2022    CHF (congestive heart failure)     Chronic fatigue     Cirrhosis of liver with ascites     Depression     Diabetes mellitus     Encounter for blood transfusion 07/22/2020    Encounter for blood transfusion 03/2022    Fatty liver     GERD (gastroesophageal reflux disease)     Hx of psychiatric care     Hypertension     Iron deficiency anemia 05/07/2022    TIBC 444 with saturated iron 8    Left leg cellulitis 05/07/2022    Liver disease     Obese     Pre-diabetes 05/07/2022    Psychiatric problem     Sleep apnea     wears cpap    SOB (shortness of breath) on exertion     Weight loss     75lb intentional weight loss     Past Surgical History:   Procedure Laterality Date    ABLATION OF ARRHYTHMOGENIC FOCUS FOR ATRIAL FIBRILLATION N/A 07/20/2020    Procedure: Ablation atrial fibrillation;  Surgeon: Gabriel Hawley MD;  Location: [facility] EP LAB;  Service: Cardiology;  Laterality: N/A;  afib, PVI, RFA, REENA, SHADY, anes, MB, 3 Prep    ABLATION OF ARRHYTHMOGENIC FOCUS FOR ATRIAL FIBRILLATION N/A 01/24/2022    Procedure: Ablation atrial fibrillation;  Surgeon: Gabriel Hawley MD;  Location: [facility] EP LAB;  Service: Cardiology;  Laterality: N/A;  afib/afl, PVI (re-do)/CTI, RFA, REENA (cx if SR), SHADY, anes, MB, 3 Prep    APPLICATION OF WOUND VACUUM-ASSISTED CLOSURE DEVICE Left 08/03/2020    Procedure: APPLICATION, WOUND VAC;  Surgeon: SEMAJ Márquez III, MD;  Location: [facility] OR [address] [place] [address];  Service: Peripheral Vascular;  Laterality: Left;    APPLICATION OF WOUND  VACUUM-ASSISTED CLOSURE DEVICE Left 08/06/2020    Procedure: APPLICATION, WOUND VAC;  Surgeon: SEMAJ Márquez III, MD;  Location: [facility] OR 2ND FLR;  Service: Peripheral Vascular;  Laterality: Left;    CARPAL TUNNEL RELEASE Right 06/10/2020    Procedure: RELEASE, CARPAL TUNNEL - RIGHT;  Surgeon: Adelaida Hall MD;  Location: [facility] OR;  Service: Orthopedics;  Laterality: Right;  GENERAL AND REGIONAL    CARPAL TUNNEL RELEASE Left 05/05/2021    Procedure: RELEASE, CARPAL TUNNEL, LEFT;  Surgeon: Adelaida Hall MD;  Location: [facility] OR;  Service: Orthopedics;  Laterality: Left;  GENERAL & REGIONAL IN PACU    CARPECTOMY Left 09/06/2023    Procedure: CARPECTOMY;  Surgeon: Adelaida Hall MD;  Location: [facility] OR;  Service: Orthopedics;  Laterality: Left;  MAC/REGIONAL  LEFT PROXIMAL ROW    CLOSURE OF WOUND Left 08/06/2020    Procedure: CLOSURE, WOUND;  Surgeon: SEMAJ Márquez III, MD;  Location: [facility] OR 2ND FLR;  Service: Peripheral Vascular;  Laterality: Left;  Complex    COLONOSCOPY N/A 08/17/2021    Procedure: COLONOSCOPY Suprep;  Surgeon: Loco Deluca MD;  Location: [facility];  Service: Endoscopy;  Laterality: N/A;    ECHOCARDIOGRAM,TRANSESOPHAGEAL N/A 07/24/2023    Procedure: Transesophageal echo (REENA) intra-procedure log documentation;  Surgeon: Leyla Diagnostic Provider;  Location: [facility] EP LAB;  Service: Cardiology;  Laterality: N/A;  s/p WM, REENA, anes, 3 Prep    ESOPHAGOGASTRODUODENOSCOPY N/A 08/17/2021    Procedure: EGD (ESOPHAGOGASTRODUODENOSCOPY);  Surgeon: Loco Deluca MD;  Location: [facility] ENDO;  Service: Endoscopy;  Laterality: N/A;  Patient is schedule to have her Covid test on 08/14/2021 at the [facility] @ 9:30am. [place].    EXPLORATION OF FEMORAL ARTERY Left 07/21/2020    Procedure: EXPLORATION, ARTERY, FEMORAL;  Surgeon: SEMAJ Márquez III, MD;  Location: [facility] OR 2ND FLR;  Service: Peripheral Vascular;  Laterality: Left;  1. Urgent Direct repair, L SFA branch  laceration    FOOT SURGERY      HYSTEROSCOPY WITH DILATION AND CURETTAGE OF UTERUS N/A 02/19/2022    Procedure: HYSTEROSCOPY, WITH DILATION AND CURETTAGE OF UTERUS;  Surgeon: Shane Palomo MD;  Location: [address];  Service: OB/GYN;  Laterality: N/A;    INCISION AND DRAINAGE OF KNEE Left 05/12/2022    Procedure: INCISION AND DRAINAGE, Prepatellar bursectomy.;  Surgeon: Dre Guzmán MD;  [address];  Service: Orthopedics;  Laterality: Left;    LEFT HEART CATHETERIZATION Left 02/07/2023    Procedure: Left heart cath;  Surgeon: Josiah Terry MD;  Location: [facility] CATH LAB;  Service: Cardiology;  Laterality: Left;  borderline moderate bleeding risk 6.3%    OCCLUSION OF LEFT ATRIAL APPENDAGE N/A 06/12/2023    Procedure: Left atrial appendage occlusion;  Surgeon: Gabriel Hawley MD;  Location: [facility] EP LAB;  Service: Cardiology;  Laterality: N/A;  afib, watchman, BSCI, demi, ALIYA ibarra, 3prep    RELEASE OF ULNAR NERVE AT CUBITAL TUNNEL Bilateral     RIGHT HEART CATHETERIZATION Right 08/05/2024    Procedure: INSERTION, CATHETER, RIGHT HEART;  Surgeon: Zane Liu MD;  Location: [facility] CATH LAB;  Service: Cardiology;  Laterality: Right;    ROBOT-ASSISTED LAPAROSCOPIC ABDOMINAL HYSTERECTOMY USING DA KEIRY XI N/A 08/09/2022    Procedure: XI ROBOTIC HYSTERECTOMY;  Surgeon: Yolanda Barriga MD;  Location: [facility];  Service: OB/GYN;  Laterality: N/A;  dual console requested    ROBOT-ASSISTED LAPAROSCOPIC SALPINGO-OOPHORECTOMY Bilateral 08/09/2022    Procedure: ROBOTIC SALPINGO-OOPHORECTOMY;  Surgeon: Yolanda Barriga MD;  Location: [facility];  Service: OB/GYN;  Laterality: Bilateral;    TRANSESOPHAGEAL ECHOCARDIOGRAPHY N/A 06/12/2023    Procedure: ECHOCARDIOGRAM, TRANSESOPHAGEAL;  Surgeon: Cyril Mast MD;  Location: [facility] EP LAB;  Service: Cardiology;  Laterality: N/A;    TREATMENT OF CARDIAC ARRHYTHMIA N/A 01/29/2020    Procedure: CARDIOVERSION;  Surgeon: Gabriel Hawley MD;  Location:  [facility] EP LAB;  Service: Cardiology;  Laterality: N/A;  af, demi, dccv, anes, mb, 345    TREATMENT OF CARDIAC ARRHYTHMIA  01/24/2022    Procedure: Cardioversion or Defibrillation;  Surgeon: Gabriel Hawley MD;  Location: [facility] EP LAB;  Service: Cardiology;;    WOUND DEBRIDEMENT Left 08/06/2020    Procedure: DEBRIDEMENT, WOUND;  Surgeon: SEMAJ Márquez III, MD;  Location: [facility] OR [address] [place] [address];  Service: Peripheral Vascular;  Laterality: Left;       Home Meds:   Prior to Admission medications    Medication Sig Start Date End Date Taking? Authorizing Provider   albuterol (VENTOLIN HFA) 90 mcg/actuation inhaler Inhale 2 puffs into the lungs every 6 (six) hours as needed for Wheezing. Rescue 1/8/25 1/8/26  Gordy Cordero MD   ALPRAZolam (XANAX) 0.5 MG tablet Take 1 tablet (0.5 mg total) by mouth 2 (two) times daily as needed for Anxiety. 2/4/25   Gordy Cordero MD   b complex vitamins capsule Take 1 capsule by mouth every other day.    Provider, Historical   DULoxetine (CYMBALTA) 60 MG capsule Take 1 capsule (60 mg total) by mouth once daily. 8/12/24   Brennon Nunez MD   ferrous sulfate 325 (65 FE) MG EC tablet Take 1 tablet (325 mg total) by mouth every other day. 9/18/24   Eugene Woodward MD   furosemide (LASIX) 20 MG tablet Take 1 tablet (20 mg total) by mouth once daily. 2/5/25 2/5/26  Gordy Cordero MD   ibuprofen (ADVIL,MOTRIN) 600 MG tablet Take 1 tablet (600 mg total) by mouth 2 (two) times daily as needed for Pain. 2/4/25   Gordy Cordero MD   lactulose (CONSTULOSE) 10 gram/15 mL solution Take 30 mLs (20 g total) by mouth 3 (three) times daily. Please ensure you are having at least 2-3 bowel movements every day. 4/3/25   Kiko Saba MD   metoprolol succinate (TOPROL-XL) 100 MG 24 hr tablet Take 1 tablet (100 mg total) by mouth 2 (two) times daily. 2/5/25 2/5/26  Marah Hunter, NP   multivitamin (THERAGRAN) per tablet Take 1 tablet by mouth once daily.    Provider, Historical   nystatin  (MYCOSTATIN) powder Apply topically 2 (two) times daily. 2/14/25   Gordy Cordero MD   omeprazole (PRILOSEC) 20 MG capsule TAKE 1 CAPSULE BY MOUTH ONCE DAILY 7/25/24   Gordy Cordero MD   ondansetron (ZOFRAN-ODT) 4 MG TbDL Take 2 tablets (8 mg total) by mouth every 8 (eight) hours as needed (nasuea, vomiting). 3/9/25   Gordy Cordero MD   oxyCODONE (ROXICODONE) 5 MG immediate release tablet Take 1 tablet (5 mg total) by mouth every 12 (twelve) hours as needed for Pain. 3/9/25   Gordy Cordero MD   spironolactone (ALDACTONE) 50 MG tablet Take 1 tablet (50 mg total) by mouth once daily. 11/12/24 6/10/25  Kiko Saba MD   traZODone (DESYREL) 100 MG tablet Take 100 mg by mouth every evening.    Wilson Jimenez   traZODone (DESYREL) 100 MG tablet Take 1 tablet (100 mg total) by mouth nightly as needed for Insomnia. 3/9/25   Gordy Cordero MD   medroxyPROGESTERone (PROVERA) 10 MG tablet Take 2 tablets (20 mg total) by mouth 3 (three) times daily. 7/13/22 8/9/22  Yolanda Barriga MD     Anticoagulants/Antiplatelets: no anticoagulation    Allergies:   Review of patient's allergies indicates:   Allergen Reactions    Flecainide Shortness Of Breath and Swelling    Shellfish containing products Other (See Comments)     Makes gout terrible    Vancomycin analogues Hives, Itching and Rash     Sedation History:  no adverse reactions    Review of Systems:   Hematological: no known coagulopathies  Respiratory: no shortness of breath  Cardiovascular: no chest pain  Gastrointestinal: no abdominal pain  Genito-Urinary: no dysuria  Musculoskeletal: negative  Neurological: no TIA or stroke symptoms         OBJECTIVE:     Vital Signs (Most Recent)  Pulse: 80 (04/04/25 0829)  Resp: 18 (04/04/25 0829)  BP: 121/81 (04/04/25 0829)  SpO2: 98 % (04/04/25 0829)    Physical Exam:  ASA: 2  Mallampati: n/a    General: no acute distress  Mental Status: alert and oriented to person, place and time  HEENT: normocephalic,  atraumatic  Chest: unlabored breathing  Heart: regular heart rate  Abdomen: distended  Extremity: moves all extremities    ASSESSMENT/PLAN:     Sedation Plan: local  Patient will undergo ultrasound guided paracentesis.    SCOUT Wolfe, FNP  Interventional Radiology  [phone] clinic

## 2025-04-11 ENCOUNTER — HOSPITAL ENCOUNTER (OUTPATIENT)
Dept: INTERVENTIONAL RADIOLOGY/VASCULAR | Facility: HOSPITAL | Age: 60
Discharge: HOME | End: 2025-04-11
Attending: INTERNAL MEDICINE
Payer: MEDICAID

## 2025-04-11 VITALS
OXYGEN SATURATION: 94 % | SYSTOLIC BLOOD PRESSURE: 125 MMHG | HEART RATE: 63 BPM | RESPIRATION RATE: 16 BRPM | DIASTOLIC BLOOD PRESSURE: 65 MMHG

## 2025-04-11 DIAGNOSIS — R18.8 CIRRHOSIS OF LIVER WITH ASCITES, UNSPECIFIED HEPATIC CIRRHOSIS TYPE: Chronic | ICD-10-CM

## 2025-04-11 DIAGNOSIS — K74.60 CIRRHOSIS OF LIVER WITH ASCITES, UNSPECIFIED HEPATIC CIRRHOSIS TYPE: Chronic | ICD-10-CM

## 2025-04-11 PROCEDURE — 63600175 PHARM REV CODE 636 W HCPCS: Performed by: FAMILY MEDICINE

## 2025-04-11 PROCEDURE — 49083 ABD PARACENTESIS W/IMAGING: CPT | Mod: ,,, | Performed by: FAMILY MEDICINE

## 2025-04-11 PROCEDURE — 49083 ABD PARACENTESIS W/IMAGING: CPT | Performed by: STUDENT IN AN ORGANIZED HEALTH CARE EDUCATION/TRAINING PROGRAM

## 2025-04-11 RX ORDER — LIDOCAINE HYDROCHLORIDE 10 MG/ML
INJECTION, SOLUTION EPIDURAL; INFILTRATION; INTRACAUDAL; PERINEURAL
Status: COMPLETED | OUTPATIENT
Start: 2025-04-11 | End: 2025-04-11

## 2025-04-11 RX ADMIN — LIDOCAINE HYDROCHLORIDE 10 ML: 10 SOLUTION INTRAVENOUS at 08:04

## 2025-04-11 NOTE — PROCEDURES
Radiology Post-Procedure Note    Pre Op Diagnosis: Ascites  Post Op Diagnosis: Same    Procedure: Ultrasound Guided Paracentesis    Procedure performed by: Jannet CUNNINGHAM, Samantha     Written Informed Consent Obtained: Yes  Specimen Removed: YES serous  Estimated Blood Loss: Minimal    Findings:   Successful LLQ paracentesis.  Albumin administered PRN per protocol.    Patient tolerated procedure well.    Samantha Power, APRN, FNP  Interventional Radiology  [phone] clinic

## 2025-04-11 NOTE — PLAN OF CARE
Paracentesis completed. Patient tolerated well; VSS. 2.6 L drained. LLQ site CDI. Patient refused printed discharge instructions and ambulated to [facility] for discharge home.

## 2025-04-11 NOTE — H&P
Radiology History & Physical      SUBJECTIVE:     Chief Complaint: abdominal distention    History of Present Illness:  Vicky Alvarado is a 59 y.o. female who presents for ultrasound guided paracentesis  Past Medical History:   Diagnosis Date    Anticoagulant long-term use     Arthritis     Atrial fibrillation     cardioversion    Atrial fibrillation with RVR     HAS WATCHMAN IN PLACE NOW    Avascular necrosis     L hand    Cellulitis of extremity 05/07/2022    CHF (congestive heart failure)     Chronic fatigue     Cirrhosis of liver with ascites     Depression     Diabetes mellitus     Encounter for blood transfusion 07/22/2020    Encounter for blood transfusion 03/2022    Fatty liver     GERD (gastroesophageal reflux disease)     Hx of psychiatric care     Hypertension     Iron deficiency anemia 05/07/2022    TIBC 444 with saturated iron 8    Left leg cellulitis 05/07/2022    Liver disease     Obese     Pre-diabetes 05/07/2022    Psychiatric problem     Sleep apnea     wears cpap    SOB (shortness of breath) on exertion     Weight loss     75lb intentional weight loss     Past Surgical History:   Procedure Laterality Date    ABLATION OF ARRHYTHMOGENIC FOCUS FOR ATRIAL FIBRILLATION N/A 07/20/2020    Procedure: Ablation atrial fibrillation;  Surgeon: Gabriel Hawley MD;  Location: [facility] EP LAB;  Service: Cardiology;  Laterality: N/A;  afib, PVI, RFA, REENA, SHADY, anes, MB, 3 Prep    ABLATION OF ARRHYTHMOGENIC FOCUS FOR ATRIAL FIBRILLATION N/A 01/24/2022    Procedure: Ablation atrial fibrillation;  Surgeon: Gabriel Hawley MD;  Location: [facility] EP LAB;  Service: Cardiology;  Laterality: N/A;  afib/afl, PVI (re-do)/CTI, RFA, REENA (cx if SR), SHADY, anes, MB, 3 Prep    APPLICATION OF WOUND VACUUM-ASSISTED CLOSURE DEVICE Left 08/03/2020    Procedure: APPLICATION, WOUND VAC;  Surgeon: SEMAJ Márquez III, MD;  Location: [facility] OR [address] [place] [address];  Service: Peripheral Vascular;  Laterality: Left;    APPLICATION OF WOUND  VACUUM-ASSISTED CLOSURE DEVICE Left 08/06/2020    Procedure: APPLICATION, WOUND VAC;  Surgeon: SEMAJ Márquez III, MD;  Location: [facility] OR 2ND FLR;  Service: Peripheral Vascular;  Laterality: Left;    CARPAL TUNNEL RELEASE Right 06/10/2020    Procedure: RELEASE, CARPAL TUNNEL - RIGHT;  Surgeon: Adelaida Hall MD;  Location: [facility] OR;  Service: Orthopedics;  Laterality: Right;  GENERAL AND REGIONAL    CARPAL TUNNEL RELEASE Left 05/05/2021    Procedure: RELEASE, CARPAL TUNNEL, LEFT;  Surgeon: Adelaida Hall MD;  Location: [facility] OR;  Service: Orthopedics;  Laterality: Left;  GENERAL & REGIONAL IN PACU    CARPECTOMY Left 09/06/2023    Procedure: CARPECTOMY;  Surgeon: Adelaida Hall MD;  Location: [facility] OR;  Service: Orthopedics;  Laterality: Left;  MAC/REGIONAL  LEFT PROXIMAL ROW    CLOSURE OF WOUND Left 08/06/2020    Procedure: CLOSURE, WOUND;  Surgeon: SEMAJ Márquez III, MD;  Location: [facility] OR 2ND FLR;  Service: Peripheral Vascular;  Laterality: Left;  Complex    COLONOSCOPY N/A 08/17/2021    Procedure: COLONOSCOPY Suprep;  Surgeon: Loco Deluca MD;  Location: [facility];  Service: Endoscopy;  Laterality: N/A;    ECHOCARDIOGRAM,TRANSESOPHAGEAL N/A 07/24/2023    Procedure: Transesophageal echo (REENA) intra-procedure log documentation;  Surgeon: Leyla Diagnostic Provider;  Location: [facility] EP LAB;  Service: Cardiology;  Laterality: N/A;  s/p WM, ERENA, anes, 3 Prep    ESOPHAGOGASTRODUODENOSCOPY N/A 08/17/2021    Procedure: EGD (ESOPHAGOGASTRODUODENOSCOPY);  Surgeon: Loco Deluca MD;  Location: [facility] ENDO;  Service: Endoscopy;  Laterality: N/A;  Patient is schedule to have her Covid test on 08/14/2021 at the [facility] @ 9:30am. [place].    EXPLORATION OF FEMORAL ARTERY Left 07/21/2020    Procedure: EXPLORATION, ARTERY, FEMORAL;  Surgeon: SEMAJ Márquez III, MD;  Location: [facility] OR 2ND FLR;  Service: Peripheral Vascular;  Laterality: Left;  1. Urgent Direct repair, L SFA branch  laceration    FOOT SURGERY      HYSTEROSCOPY WITH DILATION AND CURETTAGE OF UTERUS N/A 02/19/2022    Procedure: HYSTEROSCOPY, WITH DILATION AND CURETTAGE OF UTERUS;  Surgeon: Shane Palomo MD;  Location: [address];  Service: OB/GYN;  Laterality: N/A;    INCISION AND DRAINAGE OF KNEE Left 05/12/2022    Procedure: INCISION AND DRAINAGE, Prepatellar bursectomy.;  Surgeon: Dre Guzmán MD;  [address];  Service: Orthopedics;  Laterality: Left;    LEFT HEART CATHETERIZATION Left 02/07/2023    Procedure: Left heart cath;  Surgeon: Josiah Terry MD;  Location: [facility] CATH LAB;  Service: Cardiology;  Laterality: Left;  borderline moderate bleeding risk 6.3%    OCCLUSION OF LEFT ATRIAL APPENDAGE N/A 06/12/2023    Procedure: Left atrial appendage occlusion;  Surgeon: Gabriel Hawley MD;  Location: [facility] EP LAB;  Service: Cardiology;  Laterality: N/A;  afib, watchman, BSCI, demi, ALIYA ibarra, 3prep    RELEASE OF ULNAR NERVE AT CUBITAL TUNNEL Bilateral     RIGHT HEART CATHETERIZATION Right 08/05/2024    Procedure: INSERTION, CATHETER, RIGHT HEART;  Surgeon: Zane Liu MD;  Location: [facility] CATH LAB;  Service: Cardiology;  Laterality: Right;    ROBOT-ASSISTED LAPAROSCOPIC ABDOMINAL HYSTERECTOMY USING DA KEIRY XI N/A 08/09/2022    Procedure: XI ROBOTIC HYSTERECTOMY;  Surgeon: Yolanda Barriga MD;  Location: [facility];  Service: OB/GYN;  Laterality: N/A;  dual console requested    ROBOT-ASSISTED LAPAROSCOPIC SALPINGO-OOPHORECTOMY Bilateral 08/09/2022    Procedure: ROBOTIC SALPINGO-OOPHORECTOMY;  Surgeon: Yolanda Barriga MD;  Location: [facility];  Service: OB/GYN;  Laterality: Bilateral;    TRANSESOPHAGEAL ECHOCARDIOGRAPHY N/A 06/12/2023    Procedure: ECHOCARDIOGRAM, TRANSESOPHAGEAL;  Surgeon: Cyril Mast MD;  Location: [facility] EP LAB;  Service: Cardiology;  Laterality: N/A;    TREATMENT OF CARDIAC ARRHYTHMIA N/A 01/29/2020    Procedure: CARDIOVERSION;  Surgeon: Gabriel Hawley MD;  Location:  [facility] EP LAB;  Service: Cardiology;  Laterality: N/A;  af, demi, dccv, anes, mb, 345    TREATMENT OF CARDIAC ARRHYTHMIA  01/24/2022    Procedure: Cardioversion or Defibrillation;  Surgeon: Gabriel Hawley MD;  Location: [facility] EP LAB;  Service: Cardiology;;    WOUND DEBRIDEMENT Left 08/06/2020    Procedure: DEBRIDEMENT, WOUND;  Surgeon: SEMAJ Márquez III, MD;  Location: [facility] OR [address] [place] [address];  Service: Peripheral Vascular;  Laterality: Left;       Home Meds:   Prior to Admission medications    Medication Sig Start Date End Date Taking? Authorizing Provider   albuterol (VENTOLIN HFA) 90 mcg/actuation inhaler Inhale 2 puffs into the lungs every 6 (six) hours as needed for Wheezing. Rescue 1/8/25 1/8/26  Gordy Cordero MD   ALPRAZolam (XANAX) 0.5 MG tablet Take 1 tablet (0.5 mg total) by mouth 2 (two) times daily as needed for Anxiety. 2/4/25   Gordy Cordero MD   b complex vitamins capsule Take 1 capsule by mouth every other day.    Provider, Historical   DULoxetine (CYMBALTA) 60 MG capsule Take 1 capsule (60 mg total) by mouth once daily. 8/12/24   Brennon Nunez MD   ferrous sulfate 325 (65 FE) MG EC tablet Take 1 tablet (325 mg total) by mouth every other day. 9/18/24   Eugene Woodward MD   furosemide (LASIX) 20 MG tablet Take 1 tablet (20 mg total) by mouth once daily. 2/5/25 2/5/26  Gordy Cordero MD   ibuprofen (ADVIL,MOTRIN) 600 MG tablet Take 1 tablet (600 mg total) by mouth 2 (two) times daily as needed for Pain. 2/4/25   Gordy Cordero MD   lactulose (CONSTULOSE) 10 gram/15 mL solution Take 30 mLs (20 g total) by mouth 3 (three) times daily. Please ensure you are having at least 2-3 bowel movements every day. 4/3/25   Kiko Saba MD   metoprolol succinate (TOPROL-XL) 100 MG 24 hr tablet Take 1 tablet (100 mg total) by mouth 2 (two) times daily. 2/5/25 2/5/26  Marah Hunter, NP   multivitamin (THERAGRAN) per tablet Take 1 tablet by mouth once daily.    Provider, Historical   nystatin  (MYCOSTATIN) powder Apply topically 2 (two) times daily. 2/14/25   Gordy Cordero MD   omeprazole (PRILOSEC) 20 MG capsule TAKE 1 CAPSULE BY MOUTH ONCE DAILY 7/25/24   Gordy Cordero MD   ondansetron (ZOFRAN-ODT) 4 MG TbDL Take 2 tablets (8 mg total) by mouth every 8 (eight) hours as needed (nasuea, vomiting). 3/9/25   Gordy Cordero MD   oxyCODONE (ROXICODONE) 5 MG immediate release tablet Take 1 tablet (5 mg total) by mouth every 12 (twelve) hours as needed for Pain. 4/4/25   Gordy Cordero MD   spironolactone (ALDACTONE) 50 MG tablet Take 1 tablet (50 mg total) by mouth once daily. 11/12/24 6/10/25  Kiko Saba MD   traZODone (DESYREL) 100 MG tablet Take 100 mg by mouth every evening.    Wilson Jimenez   traZODone (DESYREL) 100 MG tablet Take 1 tablet (100 mg total) by mouth nightly as needed for Insomnia. 3/9/25   Gordy Cordero MD   medroxyPROGESTERone (PROVERA) 10 MG tablet Take 2 tablets (20 mg total) by mouth 3 (three) times daily. 7/13/22 8/9/22  Yolanda Barriga MD     Anticoagulants/Antiplatelets: no anticoagulation    Allergies:   Review of patient's allergies indicates:   Allergen Reactions    Flecainide Shortness Of Breath and Swelling    Shellfish containing products Other (See Comments)     Makes gout terrible    Vancomycin analogues Hives, Itching and Rash     Sedation History:  no adverse reactions    Review of Systems:   Hematological: no known coagulopathies  Respiratory: no shortness of breath  Cardiovascular: no chest pain  Gastrointestinal: no abdominal pain  Genito-Urinary: no dysuria  Musculoskeletal: negative  Neurological: no TIA or stroke symptoms         OBJECTIVE:     Vital Signs (Most Recent)  Pulse: 63 (04/11/25 0841)  Resp: 14 (04/11/25 0841)  BP: 134/63 (04/11/25 0841)  SpO2: 99 % (04/11/25 0841)    Physical Exam:  ASA: 2  Mallampati: n/a    General: no acute distress  Mental Status: alert and oriented to person, place and time  HEENT: normocephalic,  atraumatic  Chest: unlabored breathing  Heart: regular heart rate  Abdomen: distended  Extremity: moves all extremities    ASSESSMENT/PLAN:     Sedation Plan: local  Patient will undergo ultrasound guided paracentesis.    SCOUT Wolfe, FNP  Interventional Radiology  [phone] clinic

## 2025-04-25 ENCOUNTER — HOSPITAL ENCOUNTER (OUTPATIENT)
Dept: INTERVENTIONAL RADIOLOGY/VASCULAR | Facility: HOSPITAL | Age: 60
Discharge: HOME | End: 2025-04-25
Attending: INTERNAL MEDICINE
Payer: MEDICAID

## 2025-04-25 VITALS
OXYGEN SATURATION: 99 % | DIASTOLIC BLOOD PRESSURE: 53 MMHG | SYSTOLIC BLOOD PRESSURE: 110 MMHG | HEART RATE: 55 BPM | RESPIRATION RATE: 18 BRPM

## 2025-04-25 DIAGNOSIS — R18.8 CIRRHOSIS OF LIVER WITH ASCITES, UNSPECIFIED HEPATIC CIRRHOSIS TYPE: Chronic | ICD-10-CM

## 2025-04-25 DIAGNOSIS — K74.60 CIRRHOSIS OF LIVER WITH ASCITES, UNSPECIFIED HEPATIC CIRRHOSIS TYPE: Chronic | ICD-10-CM

## 2025-04-25 PROCEDURE — 49083 ABD PARACENTESIS W/IMAGING: CPT | Performed by: RADIOLOGY

## 2025-04-25 PROCEDURE — C1729 CATH, DRAINAGE: HCPCS

## 2025-04-25 NOTE — PROCEDURES
Radiology Post-Procedure Note    Pre Op Diagnosis: Ascites  Post Op Diagnosis: Same    Procedure: Ultrasound Guided Paracentesis    Procedure performed by: Jannet CUNNINGHAM, Samantha     Written Informed Consent Obtained: Yes  Specimen Removed: YES serous  Estimated Blood Loss: Minimal    Findings:   Successful LLQ paracentesis.  Albumin administered PRN per protocol.    Patient tolerated procedure well.    Samantha Power, APRN, FNP  Interventional Radiology  [phone] clinic

## 2025-04-25 NOTE — H&P
Radiology History & Physical      SUBJECTIVE:     Chief Complaint: abdominal distention    History of Present Illness:  Vicky Alvarado is a 59 y.o. female who presents for ultrasound guided paracentesis  Past Medical History:   Diagnosis Date    Anticoagulant long-term use     Arthritis     Atrial fibrillation     cardioversion    Atrial fibrillation with RVR     HAS WATCHMAN IN PLACE NOW    Avascular necrosis     L hand    Cellulitis of extremity 05/07/2022    CHF (congestive heart failure)     Chronic fatigue     Cirrhosis of liver with ascites     Depression     Diabetes mellitus     Encounter for blood transfusion 07/22/2020    Encounter for blood transfusion 03/2022    Fatty liver     GERD (gastroesophageal reflux disease)     Hx of psychiatric care     Hypertension     Iron deficiency anemia 05/07/2022    TIBC 444 with saturated iron 8    Left leg cellulitis 05/07/2022    Liver disease     Obese     Pre-diabetes 05/07/2022    Psychiatric problem     Sleep apnea     wears cpap    SOB (shortness of breath) on exertion     Weight loss     75lb intentional weight loss     Past Surgical History:   Procedure Laterality Date    ABLATION OF ARRHYTHMOGENIC FOCUS FOR ATRIAL FIBRILLATION N/A 07/20/2020    Procedure: Ablation atrial fibrillation;  Surgeon: Gabriel Hawley MD;  Location: [facility] EP LAB;  Service: Cardiology;  Laterality: N/A;  afib, PVI, RFA, REENA, SHADY, anes, MB, 3 Prep    ABLATION OF ARRHYTHMOGENIC FOCUS FOR ATRIAL FIBRILLATION N/A 01/24/2022    Procedure: Ablation atrial fibrillation;  Surgeon: Gabriel Hawley MD;  Location: [facility] EP LAB;  Service: Cardiology;  Laterality: N/A;  afib/afl, PVI (re-do)/CTI, RFA, REENA (cx if SR), SHADY, anes, MB, 3 Prep    APPLICATION OF WOUND VACUUM-ASSISTED CLOSURE DEVICE Left 08/03/2020    Procedure: APPLICATION, WOUND VAC;  Surgeon: SEMAJ Márquez III, MD;  Location: [facility] OR [address] [place] [address];  Service: Peripheral Vascular;  Laterality: Left;    APPLICATION OF WOUND  VACUUM-ASSISTED CLOSURE DEVICE Left 08/06/2020    Procedure: APPLICATION, WOUND VAC;  Surgeon: SEMAJ Márquez III, MD;  Location: [facility] OR 2ND FLR;  Service: Peripheral Vascular;  Laterality: Left;    CARPAL TUNNEL RELEASE Right 06/10/2020    Procedure: RELEASE, CARPAL TUNNEL - RIGHT;  Surgeon: Adelaida Hall MD;  Location: [facility] OR;  Service: Orthopedics;  Laterality: Right;  GENERAL AND REGIONAL    CARPAL TUNNEL RELEASE Left 05/05/2021    Procedure: RELEASE, CARPAL TUNNEL, LEFT;  Surgeon: Adelaida Hall MD;  Location: [facility] OR;  Service: Orthopedics;  Laterality: Left;  GENERAL & REGIONAL IN PACU    CARPECTOMY Left 09/06/2023    Procedure: CARPECTOMY;  Surgeon: Adelaida Hall MD;  Location: [facility] OR;  Service: Orthopedics;  Laterality: Left;  MAC/REGIONAL  LEFT PROXIMAL ROW    CLOSURE OF WOUND Left 08/06/2020    Procedure: CLOSURE, WOUND;  Surgeon: SEMAJ Márquez III, MD;  Location: [facility] OR 2ND FLR;  Service: Peripheral Vascular;  Laterality: Left;  Complex    COLONOSCOPY N/A 08/17/2021    Procedure: COLONOSCOPY Suprep;  Surgeon: Loco Deluca MD;  Location: [facility];  Service: Endoscopy;  Laterality: N/A;    ECHOCARDIOGRAM,TRANSESOPHAGEAL N/A 07/24/2023    Procedure: Transesophageal echo (REENA) intra-procedure log documentation;  Surgeon: Leyla Diagnostic Provider;  Location: [facility] EP LAB;  Service: Cardiology;  Laterality: N/A;  s/p WM, REENA, anes, 3 Prep    ESOPHAGOGASTRODUODENOSCOPY N/A 08/17/2021    Procedure: EGD (ESOPHAGOGASTRODUODENOSCOPY);  Surgeon: Loco Deluca MD;  Location: [facility] ENDO;  Service: Endoscopy;  Laterality: N/A;  Patient is schedule to have her Covid test on 08/14/2021 at the [facility] @ 9:30am. [place].    EXPLORATION OF FEMORAL ARTERY Left 07/21/2020    Procedure: EXPLORATION, ARTERY, FEMORAL;  Surgeon: SEMAJ Márquez III, MD;  Location: [facility] OR 2ND FLR;  Service: Peripheral Vascular;  Laterality: Left;  1. Urgent Direct repair, L SFA branch  laceration    FOOT SURGERY      HYSTEROSCOPY WITH DILATION AND CURETTAGE OF UTERUS N/A 02/19/2022    Procedure: HYSTEROSCOPY, WITH DILATION AND CURETTAGE OF UTERUS;  Surgeon: Shane Palomo MD;  Location: [address];  Service: OB/GYN;  Laterality: N/A;    INCISION AND DRAINAGE OF KNEE Left 05/12/2022    Procedure: INCISION AND DRAINAGE, Prepatellar bursectomy.;  Surgeon: Dre Guzmán MD;  [address];  Service: Orthopedics;  Laterality: Left;    LEFT HEART CATHETERIZATION Left 02/07/2023    Procedure: Left heart cath;  Surgeon: Josiah Terry MD;  Location: [facility] CATH LAB;  Service: Cardiology;  Laterality: Left;  borderline moderate bleeding risk 6.3%    OCCLUSION OF LEFT ATRIAL APPENDAGE N/A 06/12/2023    Procedure: Left atrial appendage occlusion;  Surgeon: Gabriel Hawley MD;  Location: [facility] EP LAB;  Service: Cardiology;  Laterality: N/A;  afib, watchman, BSCI, demi, ALIYA ibarra, 3prep    RELEASE OF ULNAR NERVE AT CUBITAL TUNNEL Bilateral     RIGHT HEART CATHETERIZATION Right 08/05/2024    Procedure: INSERTION, CATHETER, RIGHT HEART;  Surgeon: Zane Liu MD;  Location: [facility] CATH LAB;  Service: Cardiology;  Laterality: Right;    ROBOT-ASSISTED LAPAROSCOPIC ABDOMINAL HYSTERECTOMY USING DA KEIRY XI N/A 08/09/2022    Procedure: XI ROBOTIC HYSTERECTOMY;  Surgeon: Yolanda Barriga MD;  Location: [facility];  Service: OB/GYN;  Laterality: N/A;  dual console requested    ROBOT-ASSISTED LAPAROSCOPIC SALPINGO-OOPHORECTOMY Bilateral 08/09/2022    Procedure: ROBOTIC SALPINGO-OOPHORECTOMY;  Surgeon: Yolanda Barriga MD;  Location: [facility];  Service: OB/GYN;  Laterality: Bilateral;    TRANSESOPHAGEAL ECHOCARDIOGRAPHY N/A 06/12/2023    Procedure: ECHOCARDIOGRAM, TRANSESOPHAGEAL;  Surgeon: Cyril Mast MD;  Location: [facility] EP LAB;  Service: Cardiology;  Laterality: N/A;    TREATMENT OF CARDIAC ARRHYTHMIA N/A 01/29/2020    Procedure: CARDIOVERSION;  Surgeon: Gabriel Hawley MD;  Location:  [facility] EP LAB;  Service: Cardiology;  Laterality: N/A;  af, demi, dccv, anes, mb, 345    TREATMENT OF CARDIAC ARRHYTHMIA  01/24/2022    Procedure: Cardioversion or Defibrillation;  Surgeon: Gabriel Hawley MD;  Location: [facility] EP LAB;  Service: Cardiology;;    WOUND DEBRIDEMENT Left 08/06/2020    Procedure: DEBRIDEMENT, WOUND;  Surgeon: SEMAJ Márquez III, MD;  Location: [facility] OR [address] [place] [address];  Service: Peripheral Vascular;  Laterality: Left;       Home Meds:   Prior to Admission medications    Medication Sig Start Date End Date Taking? Authorizing Provider   albuterol (VENTOLIN HFA) 90 mcg/actuation inhaler Inhale 2 puffs into the lungs every 6 (six) hours as needed for Wheezing. Rescue 1/8/25 1/8/26  Gordy Cordero MD   ALPRAZolam (XANAX) 0.5 MG tablet Take 1 tablet (0.5 mg total) by mouth 2 (two) times daily as needed for Anxiety. 2/4/25   Gordy Cordero MD   b complex vitamins capsule Take 1 capsule by mouth every other day.    Provider, Historical   DULoxetine (CYMBALTA) 60 MG capsule Take 1 capsule (60 mg total) by mouth once daily. 8/12/24   Brennon Nunez MD   ferrous sulfate 325 (65 FE) MG EC tablet Take 1 tablet (325 mg total) by mouth every other day. 9/18/24   Eugene Woodward MD   furosemide (LASIX) 20 MG tablet Take 1 tablet (20 mg total) by mouth once daily. 2/5/25 2/5/26  Gordy Cordero MD   ibuprofen (ADVIL,MOTRIN) 600 MG tablet Take 1 tablet (600 mg total) by mouth 2 (two) times daily as needed for Pain. 2/4/25   Gordy Cordero MD   lactulose (CONSTULOSE) 10 gram/15 mL solution Take 30 mLs (20 g total) by mouth 3 (three) times daily. Please ensure you are having at least 2-3 bowel movements every day. 4/3/25   Kiko Saba MD   metoprolol succinate (TOPROL-XL) 100 MG 24 hr tablet Take 1 tablet (100 mg total) by mouth 2 (two) times daily. 2/5/25 2/5/26  Marah Hunter, NP   multivitamin (THERAGRAN) per tablet Take 1 tablet by mouth once daily.    Provider, Historical   nystatin  (MYCOSTATIN) powder Apply topically 2 (two) times daily. 2/14/25   Gordy Cordero MD   omeprazole (PRILOSEC) 20 MG capsule TAKE 1 CAPSULE BY MOUTH ONCE DAILY 7/25/24   Gordy Cordero MD   ondansetron (ZOFRAN-ODT) 4 MG TbDL Take 2 tablets (8 mg total) by mouth every 8 (eight) hours as needed (nasuea, vomiting). 3/9/25   Gordy Cordero MD   oxyCODONE (ROXICODONE) 5 MG immediate release tablet Take 1 tablet (5 mg total) by mouth every 12 (twelve) hours as needed for Pain. 4/4/25   Gordy Cordero MD   spironolactone (ALDACTONE) 50 MG tablet Take 1 tablet (50 mg total) by mouth once daily. 11/12/24 6/10/25  Kiko Saba MD   traZODone (DESYREL) 100 MG tablet Take 100 mg by mouth every evening.    Wilson Jimenez   traZODone (DESYREL) 100 MG tablet Take 1 tablet (100 mg total) by mouth nightly as needed for Insomnia. 3/9/25   Gordy Cordero MD   medroxyPROGESTERone (PROVERA) 10 MG tablet Take 2 tablets (20 mg total) by mouth 3 (three) times daily. 7/13/22 8/9/22  Yolanda Barriga MD     Anticoagulants/Antiplatelets: no anticoagulation    Allergies:   Review of patient's allergies indicates:   Allergen Reactions    Flecainide Shortness Of Breath and Swelling    Shellfish containing products Other (See Comments)     Makes gout terrible    Vancomycin analogues Hives, Itching and Rash     Sedation History:  no adverse reactions    Review of Systems:   Hematological: no known coagulopathies  Respiratory: no shortness of breath  Cardiovascular: no chest pain  Gastrointestinal: no abdominal pain  Genito-Urinary: no dysuria  Musculoskeletal: negative  Neurological: no TIA or stroke symptoms         OBJECTIVE:     Vital Signs (Most Recent)  Pulse: (!) 55 (04/25/25 0932)  Resp: 18 (04/25/25 0932)  BP: (!) 110/53 (04/25/25 0932)  SpO2: 99 % (04/25/25 0932)    Physical Exam:  ASA: 2  Mallampati: n/a    General: no acute distress  Mental Status: alert and oriented to person, place and time  HEENT:  normocephalic, atraumatic  Chest: unlabored breathing  Heart: regular heart rate  Abdomen: distended  Extremity: moves all extremities    ASSESSMENT/PLAN:     Sedation Plan: local  Patient will undergo ultrasound guided paracentesis.    SCOUT Wolfe, FNP  Interventional Radiology  [phone] [facility]

## 2025-04-25 NOTE — PLAN OF CARE
Paracentesis done. Removed 4.5L. Patient AAOx3, no distress noted, respirations even and unlabored.

## 2025-05-02 ENCOUNTER — HOSPITAL ENCOUNTER (OUTPATIENT)
Dept: INTERVENTIONAL RADIOLOGY/VASCULAR | Facility: HOSPITAL | Age: 60
Discharge: HOME | End: 2025-05-02
Attending: INTERNAL MEDICINE
Payer: MEDICAID

## 2025-05-02 VITALS
OXYGEN SATURATION: 100 % | RESPIRATION RATE: 16 BRPM | SYSTOLIC BLOOD PRESSURE: 115 MMHG | HEART RATE: 65 BPM | DIASTOLIC BLOOD PRESSURE: 60 MMHG

## 2025-05-02 DIAGNOSIS — R18.8 CIRRHOSIS OF LIVER WITH ASCITES, UNSPECIFIED HEPATIC CIRRHOSIS TYPE: Chronic | ICD-10-CM

## 2025-05-02 DIAGNOSIS — K74.60 CIRRHOSIS OF LIVER WITH ASCITES, UNSPECIFIED HEPATIC CIRRHOSIS TYPE: Chronic | ICD-10-CM

## 2025-05-02 PROCEDURE — 49083 ABD PARACENTESIS W/IMAGING: CPT | Mod: ,,, | Performed by: FAMILY MEDICINE

## 2025-05-02 PROCEDURE — 49083 ABD PARACENTESIS W/IMAGING: CPT | Performed by: STUDENT IN AN ORGANIZED HEALTH CARE EDUCATION/TRAINING PROGRAM

## 2025-05-02 PROCEDURE — C1729 CATH, DRAINAGE: HCPCS

## 2025-05-02 NOTE — Clinical Note
A pre-sedation assessment was completed by the physician immediately prior to sedation start. 
Anesthesia Type: Local Only
Statement Selected

## 2025-05-02 NOTE — H&P
Radiology History & Physical      SUBJECTIVE:     Chief Complaint: abdominal distention    History of Present Illness:  Vicky Alvarado is a 59 y.o. female who presents for ultrasound guided paracentesis  Past Medical History:   Diagnosis Date    Anticoagulant long-term use     Arthritis     Atrial fibrillation     cardioversion    Atrial fibrillation with RVR     HAS WATCHMAN IN PLACE NOW    Avascular necrosis     L hand    Cellulitis of extremity 05/07/2022    CHF (congestive heart failure)     Chronic fatigue     Cirrhosis of liver with ascites     Depression     Diabetes mellitus     Encounter for blood transfusion 07/22/2020    Encounter for blood transfusion 03/2022    Fatty liver     GERD (gastroesophageal reflux disease)     Hx of psychiatric care     Hypertension     Iron deficiency anemia 05/07/2022    TIBC 444 with saturated iron 8    Left leg cellulitis 05/07/2022    Liver disease     Obese     Pre-diabetes 05/07/2022    Psychiatric problem     Sleep apnea     wears cpap    SOB (shortness of breath) on exertion     Weight loss     75lb intentional weight loss     Past Surgical History:   Procedure Laterality Date    ABLATION OF ARRHYTHMOGENIC FOCUS FOR ATRIAL FIBRILLATION N/A 07/20/2020    Procedure: Ablation atrial fibrillation;  Surgeon: Gabriel Hawley MD;  Location: [facility] EP LAB;  Service: Cardiology;  Laterality: N/A;  afib, PVI, RFA, REENA, SHADY, anes, MB, 3 Prep    ABLATION OF ARRHYTHMOGENIC FOCUS FOR ATRIAL FIBRILLATION N/A 01/24/2022    Procedure: Ablation atrial fibrillation;  Surgeon: Gabriel Hawley MD;  Location: [facility] EP LAB;  Service: Cardiology;  Laterality: N/A;  afib/afl, PVI (re-do)/CTI, RFA, REENA (cx if SR), SHADY, anes, MB, 3 Prep    APPLICATION OF WOUND VACUUM-ASSISTED CLOSURE DEVICE Left 08/03/2020    Procedure: APPLICATION, WOUND VAC;  Surgeon: SEMAJ Márquez III, MD;  Location: [facility] OR [address] [place] [address];  Service: Peripheral Vascular;  Laterality: Left;    APPLICATION OF WOUND  VACUUM-ASSISTED CLOSURE DEVICE Left 08/06/2020    Procedure: APPLICATION, WOUND VAC;  Surgeon: SEMAJ Márquez III, MD;  Location: [facility] OR 2ND FLR;  Service: Peripheral Vascular;  Laterality: Left;    CARPAL TUNNEL RELEASE Right 06/10/2020    Procedure: RELEASE, CARPAL TUNNEL - RIGHT;  Surgeon: Adelaida Hall MD;  Location: [facility] OR;  Service: Orthopedics;  Laterality: Right;  GENERAL AND REGIONAL    CARPAL TUNNEL RELEASE Left 05/05/2021    Procedure: RELEASE, CARPAL TUNNEL, LEFT;  Surgeon: Adelaida Hall MD;  Location: [facility] OR;  Service: Orthopedics;  Laterality: Left;  GENERAL & REGIONAL IN PACU    CARPECTOMY Left 09/06/2023    Procedure: CARPECTOMY;  Surgeon: Adelaida Hall MD;  Location: [facility] OR;  Service: Orthopedics;  Laterality: Left;  MAC/REGIONAL  LEFT PROXIMAL ROW    CLOSURE OF WOUND Left 08/06/2020    Procedure: CLOSURE, WOUND;  Surgeon: SEMAJ Márquez III, MD;  Location: [facility] OR 2ND FLR;  Service: Peripheral Vascular;  Laterality: Left;  Complex    COLONOSCOPY N/A 08/17/2021    Procedure: COLONOSCOPY Suprep;  Surgeon: Loco Deluca MD;  Location: [facility];  Service: Endoscopy;  Laterality: N/A;    ECHOCARDIOGRAM,TRANSESOPHAGEAL N/A 07/24/2023    Procedure: Transesophageal echo (REENA) intra-procedure log documentation;  Surgeon: Leyla Diagnostic Provider;  Location: [facility] EP LAB;  Service: Cardiology;  Laterality: N/A;  s/p WM, REENA, anes, 3 Prep    ESOPHAGOGASTRODUODENOSCOPY N/A 08/17/2021    Procedure: EGD (ESOPHAGOGASTRODUODENOSCOPY);  Surgeon: Loco Deluca MD;  Location: [facility] ENDO;  Service: Endoscopy;  Laterality: N/A;  Patient is schedule to have her Covid test on 08/14/2021 at the [facility] @ 9:30am. [place].    EXPLORATION OF FEMORAL ARTERY Left 07/21/2020    Procedure: EXPLORATION, ARTERY, FEMORAL;  Surgeon: SEMAJ Márquez III, MD;  Location: [facility] OR 2ND FLR;  Service: Peripheral Vascular;  Laterality: Left;  1. Urgent Direct repair, L SFA branch  laceration    FOOT SURGERY      HYSTEROSCOPY WITH DILATION AND CURETTAGE OF UTERUS N/A 02/19/2022    Procedure: HYSTEROSCOPY, WITH DILATION AND CURETTAGE OF UTERUS;  Surgeon: Shane Palomo MD;  Location: [address];  Service: OB/GYN;  Laterality: N/A;    INCISION AND DRAINAGE OF KNEE Left 05/12/2022    Procedure: INCISION AND DRAINAGE, Prepatellar bursectomy.;  Surgeon: Dre Guzmán MD;  [address];  Service: Orthopedics;  Laterality: Left;    LEFT HEART CATHETERIZATION Left 02/07/2023    Procedure: Left heart cath;  Surgeon: Josiah Terry MD;  Location: [facility] CATH LAB;  Service: Cardiology;  Laterality: Left;  borderline moderate bleeding risk 6.3%    OCCLUSION OF LEFT ATRIAL APPENDAGE N/A 06/12/2023    Procedure: Left atrial appendage occlusion;  Surgeon: Gabriel Hawley MD;  Location: [facility] EP LAB;  Service: Cardiology;  Laterality: N/A;  afib, watchman, BSCI, demi, ALIYA ibarra, 3prep    RELEASE OF ULNAR NERVE AT CUBITAL TUNNEL Bilateral     RIGHT HEART CATHETERIZATION Right 08/05/2024    Procedure: INSERTION, CATHETER, RIGHT HEART;  Surgeon: Zane Liu MD;  Location: [facility] CATH LAB;  Service: Cardiology;  Laterality: Right;    ROBOT-ASSISTED LAPAROSCOPIC ABDOMINAL HYSTERECTOMY USING DA KEIRY XI N/A 08/09/2022    Procedure: XI ROBOTIC HYSTERECTOMY;  Surgeon: Yolanda Barriga MD;  Location: [facility];  Service: OB/GYN;  Laterality: N/A;  dual console requested    ROBOT-ASSISTED LAPAROSCOPIC SALPINGO-OOPHORECTOMY Bilateral 08/09/2022    Procedure: ROBOTIC SALPINGO-OOPHORECTOMY;  Surgeon: Yolanda Barriga MD;  Location: [facility];  Service: OB/GYN;  Laterality: Bilateral;    TRANSESOPHAGEAL ECHOCARDIOGRAPHY N/A 06/12/2023    Procedure: ECHOCARDIOGRAM, TRANSESOPHAGEAL;  Surgeon: Cyril Mast MD;  Location: [facility] EP LAB;  Service: Cardiology;  Laterality: N/A;    TREATMENT OF CARDIAC ARRHYTHMIA N/A 01/29/2020    Procedure: CARDIOVERSION;  Surgeon: Gabriel Hawley MD;  Location:  [facility] EP LAB;  Service: Cardiology;  Laterality: N/A;  af, demi, dccv, anes, mb, 345    TREATMENT OF CARDIAC ARRHYTHMIA  01/24/2022    Procedure: Cardioversion or Defibrillation;  Surgeon: Gabriel Hawley MD;  Location: [facility] EP LAB;  Service: Cardiology;;    WOUND DEBRIDEMENT Left 08/06/2020    Procedure: DEBRIDEMENT, WOUND;  Surgeon: SEMAJ Márquez III, MD;  Location: [facility] OR [address] [place] [address];  Service: Peripheral Vascular;  Laterality: Left;       Home Meds:   Prior to Admission medications    Medication Sig Start Date End Date Taking? Authorizing Provider   albuterol (VENTOLIN HFA) 90 mcg/actuation inhaler Inhale 2 puffs into the lungs every 6 (six) hours as needed for Wheezing. Rescue 1/8/25 1/8/26  Gordy Cordero MD   ALPRAZolam (XANAX) 0.5 MG tablet Take 1 tablet (0.5 mg total) by mouth 2 (two) times daily as needed for Anxiety. 2/4/25   Gordy Cordero MD   b complex vitamins capsule Take 1 capsule by mouth every other day.    Provider, Historical   DULoxetine (CYMBALTA) 60 MG capsule Take 1 capsule (60 mg total) by mouth once daily. 8/12/24   Brennon Nunez MD   ferrous sulfate 325 (65 FE) MG EC tablet Take 1 tablet (325 mg total) by mouth every other day. 9/18/24   Eugene Woodward MD   furosemide (LASIX) 20 MG tablet Take 1 tablet (20 mg total) by mouth once daily. 2/5/25 2/5/26  Gordy Cordero MD   ibuprofen (ADVIL,MOTRIN) 600 MG tablet Take 1 tablet (600 mg total) by mouth 2 (two) times daily as needed for Pain. 2/4/25   Gordy oCrdero MD   lactulose (CONSTULOSE) 10 gram/15 mL solution Take 30 mLs (20 g total) by mouth 3 (three) times daily. Please ensure you are having at least 2-3 bowel movements every day. 4/3/25   Kiko Saba MD   metoprolol succinate (TOPROL-XL) 100 MG 24 hr tablet Take 1 tablet (100 mg total) by mouth 2 (two) times daily. 2/5/25 2/5/26  Marah Hunter, NP   multivitamin (THERAGRAN) per tablet Take 1 tablet by mouth once daily.    Provider, Historical   nystatin  (MYCOSTATIN) powder Apply topically 2 (two) times daily. 2/14/25   Gordy Cordero MD   omeprazole (PRILOSEC) 20 MG capsule TAKE 1 CAPSULE BY MOUTH ONCE DAILY 7/25/24   Gordy Cordero MD   ondansetron (ZOFRAN-ODT) 4 MG TbDL Take 2 tablets (8 mg total) by mouth every 8 (eight) hours as needed (nasuea, vomiting). 3/9/25   Gordy Cordero MD   oxyCODONE (ROXICODONE) 5 MG immediate release tablet Take 1 tablet (5 mg total) by mouth every 12 (twelve) hours as needed for Pain. 4/4/25   Gordy Cordero MD   spironolactone (ALDACTONE) 50 MG tablet Take 1 tablet (50 mg total) by mouth once daily. 11/12/24 6/10/25  Kiko Saba MD   traZODone (DESYREL) 100 MG tablet Take 100 mg by mouth every evening.    Wilson Jimenez   traZODone (DESYREL) 100 MG tablet Take 1 tablet (100 mg total) by mouth nightly as needed for Insomnia. 3/9/25   Gordy Cordero MD   medroxyPROGESTERone (PROVERA) 10 MG tablet Take 2 tablets (20 mg total) by mouth 3 (three) times daily. 7/13/22 8/9/22  Yolanda Barriga MD     Anticoagulants/Antiplatelets: no anticoagulation    Allergies:   Review of patient's allergies indicates:   Allergen Reactions    Flecainide Shortness Of Breath and Swelling    Shellfish containing products Other (See Comments)     Makes gout terrible    Vancomycin analogues Hives, Itching and Rash     Sedation History:  no adverse reactions    Review of Systems:   Hematological: no known coagulopathies  Respiratory: no shortness of breath  Cardiovascular: no chest pain  Gastrointestinal: no abdominal pain  Genito-Urinary: no dysuria  Musculoskeletal: negative  Neurological: no TIA or stroke symptoms         OBJECTIVE:     Vital Signs (Most Recent)  Pulse: 61 (05/02/25 0750)  Resp: 18 (05/02/25 0750)  BP: 119/64 (05/02/25 0750)  SpO2: 99 % (05/02/25 0750)    Physical Exam:  ASA: 2  Mallampati: n/a    General: no acute distress  Mental Status: alert and oriented to person, place and time  HEENT: normocephalic,  atraumatic  Chest: unlabored breathing  Heart: regular heart rate  Abdomen: distended  Extremity: moves all extremities    ASSESSMENT/PLAN:     Sedation Plan: local  Patient will undergo ultrasound guided paracentesis.    SCOUT Wolfe, FNP  Interventional Radiology  [phone] clinic

## 2025-05-02 NOTE — NURSING
Paracentesis complete. 4000ml of peritoneal fluid removed. Patient tolerated well. Ambulatory out of department. VSS.

## 2025-05-02 NOTE — PROCEDURES
Radiology Post-Procedure Note    Pre Op Diagnosis: Ascites  Post Op Diagnosis: Same    Procedure: Ultrasound Guided Paracentesis    Procedure performed by: Jannet CUNNINGHAM, Samantha     Written Informed Consent Obtained: Yes  Specimen Removed: YES serous  Estimated Blood Loss: Minimal    Findings:   Successful LLQ paracentesis.  Albumin administered PRN per protocol.    Patient tolerated procedure well.    Samantha Power, APRN, FNP  Interventional Radiology  [phone] clinic

## 2025-05-05 DIAGNOSIS — R06.02 SOB (SHORTNESS OF BREATH): Primary | ICD-10-CM

## 2025-05-05 DIAGNOSIS — R53.83 FATIGUE, UNSPECIFIED TYPE: ICD-10-CM

## 2025-05-05 DIAGNOSIS — R07.9 CHEST PAIN, UNSPECIFIED TYPE: ICD-10-CM

## 2025-05-06 PROBLEM — R06.09 DOE (DYSPNEA ON EXERTION): Status: ACTIVE | Noted: 2025-05-06

## 2025-05-06 PROBLEM — I25.119 CORONARY ARTERY DISEASE INVOLVING NATIVE CORONARY ARTERY OF NATIVE HEART WITH ANGINA PECTORIS: Status: ACTIVE | Noted: 2024-01-16

## 2025-05-06 NOTE — PROGRESS NOTES
"     General Cardiology Clinic Note  Last Clinic Visit: 3/16/23 with Dr. Kilgore   General Cardiologist: Dr. Kilgore    HPI:     Vicky Alvarado is a 60 y.o. female who presents for chest pain, SOB.    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 In July of last year, she was hospitalized with decompensated cirrhosis secondary to metabolic dysfunction-associated steatotic liver disease. Liver biopsy suggested hepatic congestion. RHC 8/5/2024 with normal biventricular filling pressures, borderline low CI/CO, mild pre-capillary pulmonary hypertension, no evidence of left to right intracardiac shunt. She now follows with hepatology and undergoes weekly paracentesis, and is on lasix and lactulose.    She presents today with concerns of fatigue, SOB and intermittent CP. In the past several months, she started developing chest pain. This can occur at rest, often following a "flutter" sensation, she describes it as a heaviness. It lasts up to 1 minute at a time then resolves. This happens about 1-2x per week. It makes her feel anxious, warm, lightheaded, nauseated and short of breath, but no diaphoresis or syncope. She also has recurrent positional dizziness. Her mobility is limited so she doesn't exercise. The most exertional thing she can do is walk around the house. She moved in with her sister due to reduced ability to complete ADLs. She can't walk up stairs due to knee pain and shortness of breath. She denies PND/orthopnea or edema. Wears CPAP. Weight is overall stable but fluctuates about 10-12 lbs between paracentesis sessions. BP is well-controlled.     Of note, in February 2023 she underwent LHC following an " "abnormal PET stress. This showed non-obstructive disease (distal LCx 70-80%, RCA 50%) deemed best to manage medically at that point. Recommended PCI if symptoms of angina refractory to medical therapy.     More recently, she had a cardiac monitor which showed no atrial fibrillation or concerning arrhythmias. 3% ectopy burden. No longer on propafenone due to RBBB. She is on ASA s/p watchman in 2023.        3/2023 HPI (Dr. Kilgore)  She has good days and bad days and continues to have shortness of breath and fatigue. She rides the scooter in the grocery store. She denies chest pain/pressure/tightness/discomfort, orthopnea, PND, sustained palpitations or syncope. She has occasional peripheral edema.  She lost her balance when carrying groceries and fell and broke a rib.    11/2022 HPI (Dr. Kilgore)  57 year old female with paroxysmal AFib s/p PVI, HTN, HLD, DM, mild AS, obesity here for follow-up.  She reports extreme fatigue, cannot complete ADLs and had to move in with her sister. Just walking to her bathroom makes her short of breath. Previously had 10% PVC burden.  She has occasional, fleeting chest pain. She denies orthopnea but has occasional PND, peripheral edema, palpitations "all the time", denies syncope.  EKG today reveals NSR with PACs, PVCs, RBBB    8/2021 HPI (Dr. Morin)  On last visit she was noted to have NAPIER. NAPIER has improved since increase of lasix. Also noted improved LE edema.   GI referral was placed for microcytic anemia, GI procedure scheduled this month.  She has followed up with Sleep medicine who uptitrated her CPAP settings.   BP is now controlled with addition of Procardia.   PET stress March 2020 was negative for ischemia.  Followed by Dr. Guadarrama and has rare episodes AFib.  He recently ordered 24 hour Holter monitor for PVCs.  She remains on Eliquis. Recently discontinued Flecainide secondary to symptoms of tremor, lightheadedness with improvement in her symptoms.   Referred to bariatric " surgery due to morbid obesity.     7/2021 HPI (Dr. Morin)  Vicky Alvarado is a 56 y.o. y.o. female who presents for consultation for NAPIER.   This is a new patient for me presenting with a new problem of NAPIER. Her other chronic medical conditions include obesity, Parox-AF, HTN, DM, HLD.   She notes having new onset 6 weeks of dyspnea on exertion that improves with rest.  She denies any form of chest pain.  She denies symptoms at rest.  She has not been back to work for over 1 year.  She had just started back 1 week ago.  She does note she is deconditioned and was recently started on p.r.n. Lasix for lower extremity edema with mild improvement.  She is still taking HCTZ.  With activity she intermittently notes diaphoresis.  PET stress March 2020 was negative for ischemia.  Followed by Dr. Guadarrama and has rare episodes AFib.  He recently ordered 24 hour Holter monitor for PVCs.  She remains on Eliquis.  Lab review notes significant history for anemia and remains on Eliquis.  Has not had prior GI evaluation with colonoscopy.  She denies any form of GI bleeding however she does note she is eating ice chips 3 cups per day.  She is a former smoker but has never had lung function test.  She does have obstructive sleep apnea and uses CPAP  This is the extent of the patient's complaints at this time. Patient queried and denies any further complaint.       Surgical: Reviewed, as below.  Family: Reviewed, as below.   Social: Reviewed, as below.    ROS:    Pertinent ROS included in HPI and below.  PMH:     Past Medical History:   Diagnosis Date    Anticoagulant long-term use     Arthritis     Atrial fibrillation     cardioversion    Atrial fibrillation with RVR     HAS WATCHMAN IN PLACE NOW    Avascular necrosis     L hand    Cellulitis of extremity 05/07/2022    CHF (congestive heart failure)     Chronic fatigue     Cirrhosis of liver with ascites     Depression     Diabetes mellitus     Encounter for blood transfusion  07/22/2020    Encounter for blood transfusion 03/2022    Fatty liver     GERD (gastroesophageal reflux disease)     Hx of psychiatric care     Hypertension     Iron deficiency anemia 05/07/2022    TIBC 444 with saturated iron 8    Left leg cellulitis 05/07/2022    Liver disease     Obese     Pre-diabetes 05/07/2022    Psychiatric problem     Sleep apnea     wears cpap    SOB (shortness of breath) on exertion     Weight loss     75lb intentional weight loss     Past Surgical History:   Procedure Laterality Date    ABLATION OF ARRHYTHMOGENIC FOCUS FOR ATRIAL FIBRILLATION N/A 07/20/2020    Procedure: Ablation atrial fibrillation;  Surgeon: Gabriel Hawley MD;  Location: [facility] EP LAB;  Service: Cardiology;  Laterality: N/A;  afib, PVI, RFA, REENA, SHADY, anes, MB, 3 Prep    ABLATION OF ARRHYTHMOGENIC FOCUS FOR ATRIAL FIBRILLATION N/A 01/24/2022    Procedure: Ablation atrial fibrillation;  Surgeon: Gabriel Hawley MD;  Location: [facility] EP LAB;  Service: Cardiology;  Laterality: N/A;  afib/afl, PVI (re-do)/CTI, RFA, REENA (cx if SR), SHADY, anes, MB, 3 Prep    APPLICATION OF WOUND VACUUM-ASSISTED CLOSURE DEVICE Left 08/03/2020    Procedure: APPLICATION, WOUND VAC;  Surgeon: SEMAJ Márquez III, MD;  [facility];  Service: Peripheral Vascular;  Laterality: Left;    APPLICATION OF WOUND VACUUM-ASSISTED CLOSURE DEVICE Left 08/06/2020    Procedure: APPLICATION, WOUND VAC;  Surgeon: SEMAJ Márquez III, MD;  Location: [facility] OR 2ND FLR;  Service: Peripheral Vascular;  Laterality: Left;    CARPAL TUNNEL RELEASE Right 06/10/2020    Procedure: RELEASE, CARPAL TUNNEL - RIGHT;  Surgeon: Adelaida Hall MD;  Location: [facility];  Service: Orthopedics;  Laterality: Right;  GENERAL AND REGIONAL    CARPAL TUNNEL RELEASE Left 05/05/2021    Procedure: RELEASE, CARPAL TUNNEL, LEFT;  Surgeon: Adelaida Hall MD;  Location: [facility] OR;  Service: Orthopedics;  Laterality: Left;  GENERAL & REGIONAL IN PACU    CARPECTOMY Left  09/06/2023    Procedure: CARPECTOMY;  Surgeon: Adelaida Hall MD;  Location: [facility] OR;  Service: Orthopedics;  Laterality: Left;  MAC/REGIONAL  LEFT PROXIMAL ROW    CLOSURE OF WOUND Left 08/06/2020    Procedure: CLOSURE, WOUND;  Surgeon: SEMAJ Márquez III, MD;  Location: [address];  Service: Peripheral Vascular;  Laterality: Left;  Complex    COLONOSCOPY N/A 08/17/2021    Procedure: COLONOSCOPY Suprep;  Surgeon: Loco Deluca MD;  Location: [facility];  Service: Endoscopy;  Laterality: N/A;    ECHOCARDIOGRAM,TRANSESOPHAGEAL N/A 07/24/2023    Procedure: Transesophageal echo (REENA) intra-procedure log documentation;  Surgeon: Leyla Diagnostic Provider;  Location: [facility] EP LAB;  Service: Cardiology;  Laterality: N/A;  s/p WM, REENA, anes, 3 Prep    ESOPHAGOGASTRODUODENOSCOPY N/A 08/17/2021    Procedure: EGD (ESOPHAGOGASTRODUODENOSCOPY);  Surgeon: Loco Deluca MD;  Location: [facility];  Service: Endoscopy;  Laterality: N/A;  Patient is schedule to have her Covid test on 08/14/2021 at the [facility] @ 9:30am. [place].    EXPLORATION OF FEMORAL ARTERY Left 07/21/2020    Procedure: EXPLORATION, ARTERY, FEMORAL;  Surgeon: SEMAJ Márquez III, MD;  Location: [address];  Service: Peripheral Vascular;  Laterality: Left;  1. Urgent Direct repair, L SFA branch laceration    FOOT SURGERY      HYSTEROSCOPY WITH DILATION AND CURETTAGE OF UTERUS N/A 02/19/2022    Procedure: HYSTEROSCOPY, WITH DILATION AND CURETTAGE OF UTERUS;  Surgeon: Shane Palomo MD;  [facility];  Service: OB/GYN;  Laterality: N/A;    INCISION AND DRAINAGE OF KNEE Left 05/12/2022    Procedure: INCISION AND DRAINAGE, Prepatellar bursectomy.;  Surgeon: Dre Guzmán MD;  Location: [address];  Service: Orthopedics;  Laterality: Left;    LEFT HEART CATHETERIZATION Left 02/07/2023    Procedure: Left heart cath;  Surgeon: Josiah Terry MD;  Location: [facility] CATH LAB;  Service: Cardiology;  Laterality:  Left;  borderline moderate bleeding risk 6.3%    OCCLUSION OF LEFT ATRIAL APPENDAGE N/A 06/12/2023    Procedure: Left atrial appendage occlusion;  Surgeon: Gabriel Hawley MD;  Location: [facility] EP LAB;  Service: Cardiology;  Laterality: N/A;  afib, watchman, BSCI, demi, DESTINY ibarra, 3prep    RELEASE OF ULNAR NERVE AT CUBITAL TUNNEL Bilateral     RIGHT HEART CATHETERIZATION Right 08/05/2024    Procedure: INSERTION, CATHETER, RIGHT HEART;  Surgeon: Zane Liu MD;  Location: [facility] CATH LAB;  Service: Cardiology;  Laterality: Right;    ROBOT-ASSISTED LAPAROSCOPIC ABDOMINAL HYSTERECTOMY USING DA KEIRY XI N/A 08/09/2022    Procedure: XI ROBOTIC HYSTERECTOMY;  Surgeon: Yolanda Barriga MD;  Location: [facility];  Service: OB/GYN;  Laterality: N/A;  dual console requested    ROBOT-ASSISTED LAPAROSCOPIC SALPINGO-OOPHORECTOMY Bilateral 08/09/2022    Procedure: ROBOTIC SALPINGO-OOPHORECTOMY;  Surgeon: Yolanda Barriga MD;  Location: [facility];  Service: OB/GYN;  Laterality: Bilateral;    TRANSESOPHAGEAL ECHOCARDIOGRAPHY N/A 06/12/2023    Procedure: ECHOCARDIOGRAM, TRANSESOPHAGEAL;  Surgeon: Cyril Mast MD;  Location: [facility] EP LAB;  Service: Cardiology;  Laterality: N/A;    TREATMENT OF CARDIAC ARRHYTHMIA N/A 01/29/2020    Procedure: CARDIOVERSION;  Surgeon: Gabriel Hawley MD;  Location: [facility] EP LAB;  Service: Cardiology;  Laterality: N/A;  af, demi, dccv, destiny ibarra, 345    TREATMENT OF CARDIAC ARRHYTHMIA  01/24/2022    Procedure: Cardioversion or Defibrillation;  Surgeon: Gabriel Hawley MD;  Location: [facility] EP LAB;  Service: Cardiology;;    WOUND DEBRIDEMENT Left 08/06/2020    Procedure: DEBRIDEMENT, WOUND;  Surgeon: SEMAJ Márquez III, MD;  Location: [address];  Service: Peripheral Vascular;  Laterality: Left;     Allergies:     Review of patient's allergies indicates:   Allergen Reactions    Flecainide Shortness Of Breath and Swelling    Shellfish containing products Other (See Comments)     Makes gout  "terrible    Vancomycin analogues Hives, Itching and Rash     Medications:   Medications Ordered Prior to Encounter[1]  Social History:     Social History     Tobacco Use    Smoking status: Former     Current packs/day: 0.00     Types: Cigarettes     Quit date: 2019     Years since quittin.8    Smokeless tobacco: Never   Substance Use Topics    Alcohol use: No     Family History:     Family History   Problem Relation Name Age of Onset    Cataracts Mother      Hypertension Mother      Thyroid disease Mother      Diabetes Father      Hypertension Father      Hypertension Sister      Hypertension Brother      Amblyopia Neg Hx      Blindness Neg Hx      Cancer Neg Hx      Glaucoma Neg Hx      Macular degeneration Neg Hx      Retinal detachment Neg Hx      Strabismus Neg Hx      Stroke Neg Hx      Breast cancer Neg Hx      Colon cancer Neg Hx      Ovarian cancer Neg Hx       Physical Exam:   /79   Pulse 69   Ht 5' 3" (1.6 m)   Wt 103.2 kg (227 lb 8.2 oz)   LMP 2022 (Exact Date)   SpO2 96%   PF (!) 5 L/min   BMI 40.30 kg/m²      Physical Exam  Constitutional:       Appearance: Normal appearance.   Neck:      Vascular: No carotid bruit or JVD.   Cardiovascular:      Rate and Rhythm: Normal rate and regular rhythm.      Pulses:           Carotid pulses are 2+ on the right side and 2+ on the left side.       Radial pulses are 2+ on the right side and 2+ on the left side.        Dorsalis pedis pulses are 2+ on the right side and 2+ on the left side.      Heart sounds: Normal heart sounds.   Pulmonary:      Effort: Pulmonary effort is normal.      Breath sounds: Normal breath sounds.   Musculoskeletal:      Right lower leg: No edema.      Left lower leg: No edema.   Skin:     General: Skin is warm and dry.   Neurological:      Mental Status: She is alert.          Labs:     Blood Tests:  Lab Results   Component Value Date     (H) 09/10/2024     2025     (L) 2025    K 4.7 " 05/08/2025    K 4.9 03/14/2025     05/08/2025     03/14/2025    CO2 24 05/08/2025    CO2 23 03/14/2025    BUN 21 05/08/2025    CREATININE 1.1 05/08/2025     05/08/2025     (H) 03/14/2025    HGBA1C 4.9 07/27/2024    MG 1.7 09/18/2024    AST 20 05/08/2025    AST 19 03/14/2025    ALT 8 (L) 05/08/2025    ALT 8 (L) 03/14/2025    ALBUMIN 3.2 (L) 05/08/2025    ALBUMIN 2.8 (L) 03/14/2025    PROT 6.8 05/08/2025    PROT 6.3 03/14/2025    BILITOT 0.4 05/08/2025    BILITOT 0.3 03/14/2025    WBC 6.47 05/08/2025    HGB 11.6 (L) 05/08/2025    HGB 10.3 (L) 03/14/2025    HCT 38.5 05/08/2025    HCT 31.8 (L) 03/14/2025    HCT 27 (L) 09/10/2024    MCV 86 05/08/2025    MCV 84 03/14/2025     05/08/2025     03/14/2025    INR 1.0 05/08/2025    INR 1.0 03/14/2025    TSH 1.708 10/10/2022       Lab Results   Component Value Date    CHOL 110 (L) 12/20/2023    HDL 31 (L) 12/20/2023    TRIG 144 12/20/2023       Lab Results   Component Value Date    LDLCALC 50.2 (L) 12/20/2023       Lab Results   Component Value Date    TSH 1.708 10/10/2022       Lab Results   Component Value Date    HGBA1C 4.9 07/27/2024         Imaging:     Echocardiogram  TTE 9/2024    Left Ventricle: The left ventricle is normal in size. Normal wall thickness. Normal wall motion. There is normal systolic function with a visually estimated ejection fraction of 60 - 65%. There is indeterminate diastolic function.    Right Ventricle: Moderate right ventricular enlargement. Wall thickness is normal. Systolic function is normal.    The left atrium is severely dilated.    Left Atrium: The pulmonary veins have systolic blunting.    Mitral Valve: There is mild regurgitation.    Pulmonary Artery: There is mild pulmonary hypertension. The estimated pulmonary artery systolic pressure is 40 mmHg.    IVC/SVC: Normal venous pressure at 3 mmHg.    Ascites is present.    TTE 7/2024    Left Ventricle: The left ventricle is normal in size. Normal wall  thickness. Normal wall motion. There is normal systolic function with a visually estimated ejection fraction of 60 - 65%. There is indeterminate diastolic function.    Right Ventricle: Moderate right ventricular enlargement. Systolic function is borderline low.    Left Atrium: Left atrium is severely dilated.    Right Atrium: Right atrium is dilated. Catheter present in the right atrium.    Aortic Valve: The aortic valve is a trileaflet valve. There is mild aortic valve sclerosis. There is mild annular calcification present. There is mild stenosis. Aortic valve area by VTI is 1.63 cm². Aortic valve peak velocity is 2.39 m/s. Mean gradient is 13 mmHg. The dimensionless index is 0.52.    Pulmonary Artery: There is moderate pulmonary hypertension. The estimated pulmonary artery systolic pressure is 54 mmHg.    IVC/SVC: Normal venous pressure at 3 mmHg.    Ascites present.    TTE 10/2022  The left ventricle is mildly enlarged with normal systolic function. The estimated ejection fraction is 60%.  Normal right ventricular size with normal right ventricular systolic function.  Grade II left ventricular diastolic dysfunction.  Moderate left atrial enlargement.  There is mild aortic valve stenosis.  Aortic valve area is 1.66 cm2; peak velocity is 2.94 m/s; mean gradient is 14 mmHg.  The estimated PA systolic pressure is 35 mmHg.  Normal central venous pressure (3 mmHg).    TTE 6/2021  The left ventricle is normal in size with concentric remodeling and normal systolic function. The estimated ejection fraction is 60%.  Normal left ventricular diastolic function.  Normal right ventricular size with normal right ventricular systolic function.  Normal central venous pressure (3 mmHg).  Aortic valve area is 1.63 cm2; peak velocity is 2.63 m/s; mean gradient is 17 mmHg.  There is mild aortic valve stenosis.  The estimated ejection fraction is 60%.    TTE 5/2020  Normal left ventricular systolic function. The estimated ejection  fraction is 60%.  Severe left atrial enlargement.  Grade I (mild) left ventricular diastolic dysfunction consistent with impaired relaxation.  Normal right ventricular systolic function.  Mild right atrial enlargement.  Mild aortic valve stenosis. Aortic valve area is 1.83 cm2; peak velocity is 2.42 m/s; mean gradient is 12 mmHg.  Mild tricuspid regurgitation.  Normal central venous pressure (3 mmHg).  The estimated PA systolic pressure is 22 mmHg.    Stress testing  PET Stress 1/2023    There is a small sized, mild intensity mid to apical anteroseptal resting perfusion abnormality involving 7% of LV myocardium in the distribution of the mid  LAD. After pharmacologic stress, this perfusion abnormality is larger and more severe involving 13% of the LV myocardium, indicative of ischemia with underlying scar.    Within the perfusion abnormality, absolute myocardial perfusion (cc/min/gm) averaged 0.44 cc/min/g at rest, 0.65 cc/min/g at stress and CFR was 1.46 , which equates to moderately reduced coronary flow capacity within the LAD territory.    There are no other significant perfusion abnormalities.    The whole heart absolute myocardial perfusion values averaged 0.66 cc/min/g at rest, which is normal; 1.03 cc/min/g at stress, which is moderately reduced; and CFR is 1.57 , which is moderately reduced.    CT attenuation images demonstrate moderate diffuse coronary calcifications in the LAD territory and no aortic calcifications.    The gated perfusion images showed an ejection fraction of 53% at rest and 57% during stress. A normal ejection fraction is greater than 47%.    The wall motion is normal at rest and during stress.    The LV cavity size is mildly enlarged at rest and stress.    The ECG portion of the study is negative for ischemia.    During stress, occasional PACs are noted. , During stress, occasional PVCs are noted.    The patient reported no chest pain during the stress test.    When compared to the  previous study from 3/3/2020, there are significant changes. There is now a new reversible perfusion abnormality (with underlying scar) in the LAD territory with boderline flow capacity.    Cath Lab  [facility] 2023  Findings:  The left main was normal  The LAD had luminal irregularity without obstructive disease  The proximal circumflex and large OMB were normal.  The distal circumflex supplying the distal OMB had 70-80% stenosis.  This was a small to medium-sized vessel.  The RCA had luminal irregularity distally with a 50% stenosis before the PDA.  Summary:  Mild three-vessel disease best managed medically at this point.  Recommendations:  Add aspirin, nitrates, and beta-blockers to her current regimen.  Follow up with Dr. Kilgore  Continue weight loss and risk factor modification  PCI if symptoms of angina refractory to medical therapy.  She has other reasons for dyspnea.    Other  None    EK/9/25 - Sinus rhythm with frequent Premature ventricular complexes, 62 bpm  Right bundle branch block      Assessment:     1. Coronary artery disease involving native coronary artery of native heart with angina pectoris    2. NAPIER (dyspnea on exertion)    3. Mild aortic stenosis    4. Essential hypertension    5. Atrial Fibrillation (paroxysmal)    6. Presence of Watchman left atrial appendage closure device    7. Cirrhosis of liver with ascites, unspecified hepatic cirrhosis type    8. Metabolic dysfunction-associated steatotic liver disease (MASLD)        Plan:     Coronary artery disease involving native coronary artery of native heart with angina pectoris  NAPIER (dyspnea on exertion)  Mild aortic stenosis  Had non-obstructive dz (50-80%) on [facility] in , deemed candidate for PCI if symptoms persist.  No longer on LLT due to liver failure.  No obvious signs/sx of fluid overload on exam today.  Will update PET stress and TTE for better structural and ischemic picture.    Essential hypertension  BP well-controlled. Continue  current medication regimen. Encourage low-sodium diet.    Atrial Fibrillation (paroxysmal)  Presence of Watchman left atrial appendage closure device  Stable, follows with EP. In SR today. Recent monitor reassuring with no AF/arrhythmia.     Cirrhosis of liver with ascites, unspecified hepatic cirrhosis type  Metabolic dysfunction-associated steatotic liver disease (MASLD)  Gets weekly paracentesis and follows closely with hepatology.      Signed:  Lauren Vlosich, PA-C [facility]     5/9/2025     Follow-up:     Future Appointments   Date Time Provider Department Center   5/12/2025 10:15 AM [facility] OIC-US1 MASTER [facility] ULTR [facility]   5/15/2025  8:30 AM [facility] IR1-MPU [facility] RAD [facility]   5/19/2025 10:00 AM Kiko Saba MD [facility] HEPAT [facility] [address]   5/23/2025  8:30 AM [facility] IR1-MPU [facility] RAD IR [facility]   5/30/2025  8:30 AM [facility] IR1-MPU [facility]   6/6/2025  8:30 AM [facility] IR1-MPU [facility] RAD IR [facility]              [1]   Current Outpatient Medications on File Prior to Visit   Medication Sig Dispense Refill    albuterol (VENTOLIN HFA) 90 mcg/actuation inhaler Inhale 2 puffs into the lungs every 6 (six) hours as needed for Wheezing. Rescue 18 g 6    ALPRAZolam (XANAX) 0.5 MG tablet Take 1 tablet (0.5 mg total) by mouth 2 (two) times daily as needed for Anxiety. 60 tablet 4    b complex vitamins capsule Take 1 capsule by mouth every other day.      DULoxetine (CYMBALTA) 60 MG capsule Take 1 capsule (60 mg total) by mouth once daily. 180 capsule 3    ferrous sulfate 325 (65 FE) MG EC tablet Take 1 tablet (325 mg total) by mouth every other day. 15 tablet 2    furosemide (LASIX) 20 MG tablet Take 1 tablet (20 mg total) by mouth once daily. 90 tablet 3    ibuprofen (ADVIL,MOTRIN) 600 MG tablet Take 1 tablet (600 mg total) by mouth 2 (two) times daily as needed for Pain. 180 tablet 2    lactulose (CONSTULOSE) 10 gram/15 mL solution Take 30 mLs (20 g total) by mouth 3 (three) times daily.  Please ensure you are having at least 2-3 bowel movements every day. 900 mL 6    metoprolol succinate (TOPROL-XL) 100 MG 24 hr tablet Take 1 tablet (100 mg total) by mouth 2 (two) times daily. 180 tablet 3    multivitamin (THERAGRAN) per tablet Take 1 tablet by mouth once daily.      nystatin (MYCOSTATIN) powder Apply topically 2 (two) times daily. 60 g 1    omeprazole (PRILOSEC) 20 MG capsule TAKE 1 CAPSULE BY MOUTH ONCE DAILY 90 capsule 3    ondansetron (ZOFRAN-ODT) 4 MG TbDL Take 2 tablets (8 mg total) by mouth every 8 (eight) hours as needed (nasuea, vomiting). 30 tablet 1    oxyCODONE (ROXICODONE) 5 MG immediate release tablet Take 1 tablet (5 mg total) by mouth every 12 (twelve) hours as needed for Pain. 40 tablet 0    spironolactone (ALDACTONE) 50 MG tablet Take 1 tablet (50 mg total) by mouth once daily. 30 tablet 6    traZODone (DESYREL) 100 MG tablet Take 100 mg by mouth every evening.      traZODone (DESYREL) 100 MG tablet Take 1 tablet (100 mg total) by mouth nightly as needed for Insomnia. 90 tablet 3    [DISCONTINUED] medroxyPROGESTERone (PROVERA) 10 MG tablet Take 2 tablets (20 mg total) by mouth 3 (three) times daily. 180 tablet 0     No current facility-administered medications on file prior to visit.

## 2025-05-07 ENCOUNTER — TELEPHONE (OUTPATIENT)
Dept: CARDIOLOGY | Facility: CLINIC | Age: 60
End: 2025-05-07
Payer: MEDICAID

## 2025-05-08 ENCOUNTER — HOSPITAL ENCOUNTER (OUTPATIENT)
Dept: INTERVENTIONAL RADIOLOGY/VASCULAR | Facility: HOSPITAL | Age: 60
Discharge: HOME | End: 2025-05-08
Attending: INTERNAL MEDICINE
Payer: MEDICAID

## 2025-05-08 VITALS
OXYGEN SATURATION: 98 % | RESPIRATION RATE: 14 BRPM | HEART RATE: 57 BPM | SYSTOLIC BLOOD PRESSURE: 128 MMHG | DIASTOLIC BLOOD PRESSURE: 61 MMHG

## 2025-05-08 DIAGNOSIS — K74.60 CIRRHOSIS OF LIVER WITH ASCITES, UNSPECIFIED HEPATIC CIRRHOSIS TYPE: Chronic | ICD-10-CM

## 2025-05-08 DIAGNOSIS — R18.8 CIRRHOSIS OF LIVER WITH ASCITES, UNSPECIFIED HEPATIC CIRRHOSIS TYPE: Chronic | ICD-10-CM

## 2025-05-08 PROCEDURE — 63600175 PHARM REV CODE 636 W HCPCS: Performed by: RADIOLOGY

## 2025-05-08 RX ORDER — LIDOCAINE HYDROCHLORIDE 10 MG/ML
INJECTION, SOLUTION EPIDURAL; INFILTRATION; INTRACAUDAL; PERINEURAL
Status: COMPLETED | OUTPATIENT
Start: 2025-05-08 | End: 2025-05-08

## 2025-05-08 RX ADMIN — LIDOCAINE HYDROCHLORIDE 5 ML: 10 SOLUTION INTRAVENOUS at 09:05

## 2025-05-08 NOTE — DISCHARGE SUMMARY
Radiology Discharge Summary      Hospital Course: No complications    Admit Date: 5/8/2025  Discharge Date: 05/08/2025     Instructions Given to Patient: Yes  Diet: regular diet and Resume prior diet  Activity: activity as tolerated    Description of Condition on Discharge: Stable  Vital Signs (Most Recent): Pulse: (!) 56 (05/08/25 0825)  Resp: 16 (05/08/25 0825)  BP: (!) 177/78 (05/08/25 0825)  SpO2: 100 % (05/08/25 0825)    Discharge Disposition: Home    Discharge Diagnosis: Ascites s/p paracentesis     Follow-up: JOHANNA Ames MD  Interventional Radiologist  Department of Radiology

## 2025-05-08 NOTE — H&P
Radiology History & Physical      SUBJECTIVE:     Chief Complaint: Ascites    History of Present Illness:  Vicky Alvarado is a 60 y.o. female with ascites who presents for paracentesis.    Past Medical History:   Diagnosis Date    Anticoagulant long-term use     Arthritis     Atrial fibrillation     cardioversion    Atrial fibrillation with RVR     HAS WATCHMAN IN PLACE NOW    Avascular necrosis     L hand    Cellulitis of extremity 05/07/2022    CHF (congestive heart failure)     Chronic fatigue     Cirrhosis of liver with ascites     Depression     Diabetes mellitus     Encounter for blood transfusion 07/22/2020    Encounter for blood transfusion 03/2022    Fatty liver     GERD (gastroesophageal reflux disease)     Hx of psychiatric care     Hypertension     Iron deficiency anemia 05/07/2022    TIBC 444 with saturated iron 8    Left leg cellulitis 05/07/2022    Liver disease     Obese     Pre-diabetes 05/07/2022    Psychiatric problem     Sleep apnea     wears cpap    SOB (shortness of breath) on exertion     Weight loss     75lb intentional weight loss     Past Surgical History:   Procedure Laterality Date    ABLATION OF ARRHYTHMOGENIC FOCUS FOR ATRIAL FIBRILLATION N/A 07/20/2020    Procedure: Ablation atrial fibrillation;  Surgeon: Gabriel Hawley MD;  Location: [facility] EP LAB;  Service: Cardiology;  Laterality: N/A;  afib, PVI, RFA, REENA, SHADY, anes, MB, 3 Prep    ABLATION OF ARRHYTHMOGENIC FOCUS FOR ATRIAL FIBRILLATION N/A 01/24/2022    Procedure: Ablation atrial fibrillation;  Surgeon: Gabriel Hawley MD;  Location: [facility] EP LAB;  Service: Cardiology;  Laterality: N/A;  afib/afl, PVI (re-do)/CTI, RFA, REENA (cx if SR), SHADY, anes, MB, 3 Prep    APPLICATION OF WOUND VACUUM-ASSISTED CLOSURE DEVICE Left 08/03/2020    Procedure: APPLICATION, WOUND VAC;  Surgeon: SEMAJ Márquez III, MD;  Location: [facility] OR [address] [place] [address];  Service: Peripheral Vascular;  Laterality: Left;    APPLICATION OF WOUND VACUUM-ASSISTED  CLOSURE DEVICE Left 08/06/2020    Procedure: APPLICATION, WOUND VAC;  Surgeon: SEMAJ Márquez III, MD;  Location: [facility] OR 2ND FLR;  Service: Peripheral Vascular;  Laterality: Left;    CARPAL TUNNEL RELEASE Right 06/10/2020    Procedure: RELEASE, CARPAL TUNNEL - RIGHT;  Surgeon: Adelaida Hall MD;  Location: [facility] OR;  Service: Orthopedics;  Laterality: Right;  GENERAL AND REGIONAL    CARPAL TUNNEL RELEASE Left 05/05/2021    Procedure: RELEASE, CARPAL TUNNEL, LEFT;  Surgeon: Adelaida Hall MD;  Location: [facility] OR;  Service: Orthopedics;  Laterality: Left;  GENERAL & REGIONAL IN PACU    CARPECTOMY Left 09/06/2023    Procedure: CARPECTOMY;  Surgeon: Adelaida Hall MD;  Location: [facility] OR;  Service: Orthopedics;  Laterality: Left;  MAC/REGIONAL  LEFT PROXIMAL ROW    CLOSURE OF WOUND Left 08/06/2020    Procedure: CLOSURE, WOUND;  Surgeon: SEMAJ Márquez III, MD;  Location: [facility] OR 2ND FLR;  Service: Peripheral Vascular;  Laterality: Left;  Complex    COLONOSCOPY N/A 08/17/2021    Procedure: COLONOSCOPY Suprep;  Surgeon: Loco Deluca MD;  Location: [facility];  Service: Endoscopy;  Laterality: N/A;    ECHOCARDIOGRAM,TRANSESOPHAGEAL N/A 07/24/2023    Procedure: Transesophageal echo (REENA) intra-procedure log documentation;  Surgeon: Leyla Diagnostic Provider;  Location: [facility] EP LAB;  Service: Cardiology;  Laterality: N/A;  s/p WM, REENA, anes, 3 Prep    ESOPHAGOGASTRODUODENOSCOPY N/A 08/17/2021    Procedure: EGD (ESOPHAGOGASTRODUODENOSCOPY);  Surgeon: Loco Deluca MD;  Location: [facility] ENDO;  Service: Endoscopy;  Laterality: N/A;  Patient is schedule to have her Covid test on 08/14/2021 at the [facility] @ 9:30am. [place].    EXPLORATION OF FEMORAL ARTERY Left 07/21/2020    Procedure: EXPLORATION, ARTERY, FEMORAL;  Surgeon: SEMAJ Márquez III, MD;  Location: [facility] OR 2ND FLR;  Service: Peripheral Vascular;  Laterality: Left;  1. Urgent Direct repair, L SFA branch laceration    FOOT  SURGERY      HYSTEROSCOPY WITH DILATION AND CURETTAGE OF UTERUS N/A 02/19/2022    Procedure: HYSTEROSCOPY, WITH DILATION AND CURETTAGE OF UTERUS;  Surgeon: Shane Palomo MD;  [address]  Service: OB/GYN;  Laterality: N/A;    INCISION AND DRAINAGE OF KNEE Left 05/12/2022    Procedure: INCISION AND DRAINAGE, Prepatellar bursectomy.;  Surgeon: Dre Guzmán MD;  [address]  Service: Orthopedics;  Laterality: Left;    LEFT HEART CATHETERIZATION Left 02/07/2023    Procedure: Left heart cath;  Surgeon: Josiah Terry MD;  Location: [facility] CATH LAB;  Service: Cardiology;  Laterality: Left;  borderline moderate bleeding risk 6.3%    OCCLUSION OF LEFT ATRIAL APPENDAGE N/A 06/12/2023    Procedure: Left atrial appendage occlusion;  Surgeon: Gabriel Hawley MD;  Location: [facility] EP LAB;  Service: Cardiology;  Laterality: N/A;  afib, watchman, BSCI, demi, ALIYA ibarra, 3prep    RELEASE OF ULNAR NERVE AT CUBITAL TUNNEL Bilateral     RIGHT HEART CATHETERIZATION Right 08/05/2024    Procedure: INSERTION, CATHETER, RIGHT HEART;  Surgeon: Zane Liu MD;  Location: [facility] CATH LAB;  Service: Cardiology;  Laterality: Right;    ROBOT-ASSISTED LAPAROSCOPIC ABDOMINAL HYSTERECTOMY USING DA KEIRY XI N/A 08/09/2022    Procedure: XI ROBOTIC HYSTERECTOMY;  Surgeon: Yolanda Barriga MD;  Location: [facility];  Service: OB/GYN;  Laterality: N/A;  dual console requested    ROBOT-ASSISTED LAPAROSCOPIC SALPINGO-OOPHORECTOMY Bilateral 08/09/2022    Procedure: ROBOTIC SALPINGO-OOPHORECTOMY;  Surgeon: Yolanda Barriga MD;  Location: [facility];  Service: OB/GYN;  Laterality: Bilateral;    TRANSESOPHAGEAL ECHOCARDIOGRAPHY N/A 06/12/2023    Procedure: ECHOCARDIOGRAM, TRANSESOPHAGEAL;  Surgeon: Cyril Mast MD;  Location: [facility] EP LAB;  Service: Cardiology;  Laterality: N/A;    TREATMENT OF CARDIAC ARRHYTHMIA N/A 01/29/2020    Procedure: CARDIOVERSION;  Surgeon: Gabriel Hawley MD;  Location: [facility] EP LAB;   Service: Cardiology;  Laterality: N/A;  af, demi, dccv, anes, mb, 345    TREATMENT OF CARDIAC ARRHYTHMIA  01/24/2022    Procedure: Cardioversion or Defibrillation;  Surgeon: Gabriel Hawley MD;  Location: [facility] EP LAB;  Service: Cardiology;;    WOUND DEBRIDEMENT Left 08/06/2020    Procedure: DEBRIDEMENT, WOUND;  Surgeon: SEMAJ Márquez III, MD;  Location: [facility] OR [address] [place] [address];  Service: Peripheral Vascular;  Laterality: Left;       Home Meds:   Prior to Admission medications    Medication Sig Start Date End Date Taking? Authorizing Provider   albuterol (VENTOLIN HFA) 90 mcg/actuation inhaler Inhale 2 puffs into the lungs every 6 (six) hours as needed for Wheezing. Rescue 1/8/25 1/8/26  Gordy Cordero MD   ALPRAZolam (XANAX) 0.5 MG tablet Take 1 tablet (0.5 mg total) by mouth 2 (two) times daily as needed for Anxiety. 2/4/25   Gordy Cordero MD   b complex vitamins capsule Take 1 capsule by mouth every other day.    Provider, Historical   DULoxetine (CYMBALTA) 60 MG capsule Take 1 capsule (60 mg total) by mouth once daily. 8/12/24   Brennon Nunez MD   ferrous sulfate 325 (65 FE) MG EC tablet Take 1 tablet (325 mg total) by mouth every other day. 9/18/24   Eugene Woodward MD   furosemide (LASIX) 20 MG tablet Take 1 tablet (20 mg total) by mouth once daily. 2/5/25 2/5/26  Gordy Cordero MD   ibuprofen (ADVIL,MOTRIN) 600 MG tablet Take 1 tablet (600 mg total) by mouth 2 (two) times daily as needed for Pain. 2/4/25   Gordy Cordero MD   lactulose (CONSTULOSE) 10 gram/15 mL solution Take 30 mLs (20 g total) by mouth 3 (three) times daily. Please ensure you are having at least 2-3 bowel movements every day. 4/3/25   Kiko Saba MD   metoprolol succinate (TOPROL-XL) 100 MG 24 hr tablet Take 1 tablet (100 mg total) by mouth 2 (two) times daily. 2/5/25 2/5/26  Marah Hunter, NP   multivitamin (THERAGRAN) per tablet Take 1 tablet by mouth once daily.    Provider, Historical   nystatin (MYCOSTATIN) powder  Apply topically 2 (two) times daily. 5/7/25   Gordy Cordero MD   omeprazole (PRILOSEC) 20 MG capsule TAKE 1 CAPSULE BY MOUTH ONCE DAILY 7/25/24   Gordy Cordero MD   ondansetron (ZOFRAN-ODT) 4 MG TbDL Take 2 tablets (8 mg total) by mouth every 8 (eight) hours as needed (nasuea, vomiting). 5/7/25   Gordy Cordero MD   oxyCODONE (ROXICODONE) 5 MG immediate release tablet Take 1 tablet (5 mg total) by mouth every 12 (twelve) hours as needed for Pain. 5/7/25   Gordy Cordero MD   spironolactone (ALDACTONE) 50 MG tablet Take 1 tablet (50 mg total) by mouth once daily. 11/12/24 6/11/25  Kiko Saba MD   traZODone (DESYREL) 100 MG tablet Take 100 mg by mouth every evening.    Wilson Jimenez   traZODone (DESYREL) 100 MG tablet Take 1 tablet (100 mg total) by mouth nightly as needed for Insomnia. 3/9/25   Gordy Cordero MD   medroxyPROGESTERone (PROVERA) 10 MG tablet Take 2 tablets (20 mg total) by mouth 3 (three) times daily. 7/13/22 8/9/22  Yolanda Barriga MD     Anticoagulants/Antiplatelets: no anticoagulation    Allergies:   Review of patient's allergies indicates:   Allergen Reactions    Flecainide Shortness Of Breath and Swelling    Shellfish containing products Other (See Comments)     Makes gout terrible    Vancomycin analogues Hives, Itching and Rash     Sedation History:  no adverse reactions    Review of Systems:   Hematological: no known coagulopathies  Respiratory: no shortness of breath  Cardiovascular: no chest pain  Gastrointestinal: no abdominal pain  Genito-Urinary: no dysuria  Musculoskeletal: negative  Neurological: no TIA or stroke symptoms         OBJECTIVE:     Vital Signs (Most Recent)  Pulse: (!) 56 (05/08/25 0825)  Resp: 16 (05/08/25 0825)  BP: (!) 177/78 (05/08/25 0825)  SpO2: 100 % (05/08/25 0825)    Physical Exam:  ASA: n/a  Mallampati: n/a    General: no acute distress  Mental Status: alert and oriented to person, place and time  HEENT: normocephalic,  atraumatic  Chest: unlabored breathing  Heart: regular heart rate  Abdomen: distended  Extremity: moves all extremities    Laboratory  Lab Results   Component Value Date    INR 1.0 04/25/2025       Lab Results   Component Value Date    WBC 6.37 04/25/2025    HGB 11.3 (L) 04/25/2025    HCT 37.1 04/25/2025    MCV 85 04/25/2025     04/25/2025      Lab Results   Component Value Date     04/25/2025     04/25/2025    K 4.9 04/25/2025     04/25/2025    CO2 24 04/25/2025    BUN 33 (H) 04/25/2025    CREATININE 1.4 04/25/2025    CALCIUM 9.5 04/25/2025    MG 1.7 09/18/2024    ALT 9 (L) 04/25/2025    AST 22 04/25/2025    ALBUMIN 3.4 (L) 04/25/2025    BILITOT 0.5 04/25/2025    BILIDIR 0.8 (H) 08/02/2024       ASSESSMENT/PLAN:     Sedation Plan: Local only.  Patient will undergo paracentesis.    Bruce Ames MD  Interventional Radiologist  Department of Radiology

## 2025-05-08 NOTE — DISCHARGE INSTRUCTIONS
Interventional Radiology Clinic   For complications   [phone]. Monday - Friday, 8:00 am - 4:00 pm    [phone] After hours and on holidays. Ask to speak with the interventional radiologist on call.     For Scheduling   [phone] Monday - Friday, 8:00 am - 4:00 pm

## 2025-05-08 NOTE — PROCEDURES
Radiology Post-Procedure Note    Pre Op Diagnosis: Ascites  Post Op Diagnosis: Same    Procedure: Paracentesis    Procedure performed by: Bruce Ames MD    Written Informed Consent Obtained: Yes  Specimen Removed: NO  Estimated Blood Loss: Minimal    Findings:   Successful paracentesis.  Albumin administered PRN per protocol.    Patient tolerated procedure well.    Bruce Ames MD  Interventional Radiologist  Department of Radiology

## 2025-05-08 NOTE — PLAN OF CARE
Paracentesis completed, pt tolerated well. No apparent distress noted. 4000 ml removed from LLQ, mepore applied CDI. Albumin not given per protocol.  Discharge instructions reviewed and acknowledged. Pt discharged via ambulation. Steady nura observed.

## 2025-05-09 ENCOUNTER — HOSPITAL ENCOUNTER (OUTPATIENT)
Dept: CARDIOLOGY | Facility: CLINIC | Age: 60
Discharge: HOME | End: 2025-05-09
Payer: MEDICAID

## 2025-05-09 ENCOUNTER — OFFICE VISIT (OUTPATIENT)
Dept: CARDIOLOGY | Facility: CLINIC | Age: 60
End: 2025-05-09
Payer: MEDICAID

## 2025-05-09 VITALS
HEART RATE: 69 BPM | BODY MASS INDEX: 40.31 KG/M2 | SYSTOLIC BLOOD PRESSURE: 122 MMHG | WEIGHT: 227.5 LBS | OXYGEN SATURATION: 96 % | DIASTOLIC BLOOD PRESSURE: 79 MMHG | HEIGHT: 63 IN

## 2025-05-09 DIAGNOSIS — R18.8 CIRRHOSIS OF LIVER WITH ASCITES, UNSPECIFIED HEPATIC CIRRHOSIS TYPE: Chronic | ICD-10-CM

## 2025-05-09 DIAGNOSIS — I48.0 PAF (PAROXYSMAL ATRIAL FIBRILLATION): Chronic | ICD-10-CM

## 2025-05-09 DIAGNOSIS — I25.119 CORONARY ARTERY DISEASE INVOLVING NATIVE CORONARY ARTERY OF NATIVE HEART WITH ANGINA PECTORIS: Primary | ICD-10-CM

## 2025-05-09 DIAGNOSIS — I35.0 MILD AORTIC STENOSIS: Chronic | ICD-10-CM

## 2025-05-09 DIAGNOSIS — R06.09 DOE (DYSPNEA ON EXERTION): ICD-10-CM

## 2025-05-09 DIAGNOSIS — Z95.818 PRESENCE OF WATCHMAN LEFT ATRIAL APPENDAGE CLOSURE DEVICE: ICD-10-CM

## 2025-05-09 DIAGNOSIS — R06.02 SOB (SHORTNESS OF BREATH): ICD-10-CM

## 2025-05-09 DIAGNOSIS — K74.60 CIRRHOSIS OF LIVER WITH ASCITES, UNSPECIFIED HEPATIC CIRRHOSIS TYPE: Chronic | ICD-10-CM

## 2025-05-09 DIAGNOSIS — K76.0 METABOLIC DYSFUNCTION-ASSOCIATED STEATOTIC LIVER DISEASE (MASLD): Chronic | ICD-10-CM

## 2025-05-09 DIAGNOSIS — R53.83 FATIGUE, UNSPECIFIED TYPE: ICD-10-CM

## 2025-05-09 DIAGNOSIS — I10 ESSENTIAL HYPERTENSION: Chronic | ICD-10-CM

## 2025-05-09 DIAGNOSIS — R07.9 CHEST PAIN, UNSPECIFIED TYPE: ICD-10-CM

## 2025-05-09 LAB
OHS QRS DURATION: 138 MS
OHS QTC CALCULATION: 446 MS

## 2025-05-09 PROCEDURE — 93005 ELECTROCARDIOGRAM TRACING: CPT | Mod: PBBFAC | Performed by: INTERNAL MEDICINE

## 2025-05-09 PROCEDURE — 99999 PR PBB SHADOW E&M-EST. PATIENT-LVL IV: CPT | Mod: PBBFAC,,,

## 2025-05-09 PROCEDURE — 99214 OFFICE O/P EST MOD 30 MIN: CPT | Mod: PBBFAC,25

## 2025-05-09 PROCEDURE — 93010 ELECTROCARDIOGRAM REPORT: CPT | Mod: S$PBB,,, | Performed by: INTERNAL MEDICINE

## 2025-05-14 ENCOUNTER — HOSPITAL ENCOUNTER (OUTPATIENT)
Dept: RADIOLOGY | Facility: HOSPITAL | Age: 60
Discharge: HOME | End: 2025-05-14
Attending: INTERNAL MEDICINE
Payer: MEDICAID

## 2025-05-14 DIAGNOSIS — K74.69 OTHER CIRRHOSIS OF LIVER: Chronic | ICD-10-CM

## 2025-05-14 PROCEDURE — 76700 US EXAM ABDOM COMPLETE: CPT | Mod: TC

## 2025-05-14 PROCEDURE — 76700 US EXAM ABDOM COMPLETE: CPT | Mod: 26,,, | Performed by: RADIOLOGY

## 2025-05-15 ENCOUNTER — HOSPITAL ENCOUNTER (OUTPATIENT)
Dept: INTERVENTIONAL RADIOLOGY/VASCULAR | Facility: HOSPITAL | Age: 60
Discharge: HOME | End: 2025-05-15
Attending: INTERNAL MEDICINE
Payer: MEDICAID

## 2025-05-15 VITALS
SYSTOLIC BLOOD PRESSURE: 142 MMHG | DIASTOLIC BLOOD PRESSURE: 76 MMHG | RESPIRATION RATE: 16 BRPM | HEART RATE: 67 BPM | OXYGEN SATURATION: 97 %

## 2025-05-15 DIAGNOSIS — R18.8 CIRRHOSIS OF LIVER WITH ASCITES, UNSPECIFIED HEPATIC CIRRHOSIS TYPE: Chronic | ICD-10-CM

## 2025-05-15 DIAGNOSIS — K74.60 CIRRHOSIS OF LIVER WITH ASCITES, UNSPECIFIED HEPATIC CIRRHOSIS TYPE: Chronic | ICD-10-CM

## 2025-05-15 PROCEDURE — 63600175 PHARM REV CODE 636 W HCPCS

## 2025-05-15 RX ORDER — LIDOCAINE HYDROCHLORIDE 10 MG/ML
INJECTION, SOLUTION INFILTRATION; PERINEURAL
Status: COMPLETED | OUTPATIENT
Start: 2025-05-15 | End: 2025-05-15

## 2025-05-15 RX ADMIN — LIDOCAINE HYDROCHLORIDE 10 ML: 10 INJECTION, SOLUTION INFILTRATION; PERINEURAL at 09:05

## 2025-05-15 NOTE — H&P
Radiology History & Physical      SUBJECTIVE:     Chief Complaint: Abdominal Distension    History of Present Illness:  Vicky Alvarado is a 60 y.o. female who presents for US guided paracentesis.     Past Medical History:   Diagnosis Date    Anticoagulant long-term use     Arthritis     Atrial fibrillation     cardioversion    Atrial fibrillation with RVR     HAS WATCHMAN IN PLACE NOW    Avascular necrosis     L hand    Cellulitis of extremity 05/07/2022    CHF (congestive heart failure)     Chronic fatigue     Cirrhosis of liver with ascites     Depression     Diabetes mellitus     Encounter for blood transfusion 07/22/2020    Encounter for blood transfusion 03/2022    Fatty liver     GERD (gastroesophageal reflux disease)     Hx of psychiatric care     Hypertension     Iron deficiency anemia 05/07/2022    TIBC 444 with saturated iron 8    Left leg cellulitis 05/07/2022    Liver disease     Obese     Pre-diabetes 05/07/2022    Psychiatric problem     Sleep apnea     wears cpap    SOB (shortness of breath) on exertion     Weight loss     75lb intentional weight loss     Past Surgical History:   Procedure Laterality Date    ABLATION OF ARRHYTHMOGENIC FOCUS FOR ATRIAL FIBRILLATION N/A 07/20/2020    Procedure: Ablation atrial fibrillation;  Surgeon: Gabriel Hawley MD;  Location: [facility] EP LAB;  Service: Cardiology;  Laterality: N/A;  afib, PVI, RFA, REENA, SHADY, anes, MB, 3 Prep    ABLATION OF ARRHYTHMOGENIC FOCUS FOR ATRIAL FIBRILLATION N/A 01/24/2022    Procedure: Ablation atrial fibrillation;  Surgeon: Gabriel Hawley MD;  Location: [facility] EP LAB;  Service: Cardiology;  Laterality: N/A;  afib/afl, PVI (re-do)/CTI, RFA, REENA (cx if SR), SHADY, anes, MB, 3 Prep    APPLICATION OF WOUND VACUUM-ASSISTED CLOSURE DEVICE Left 08/03/2020    Procedure: APPLICATION, WOUND VAC;  Surgeon: SEMAJ Márquez III, MD;  Location: [facility] OR [address] [place] [address];  Service: Peripheral Vascular;  Laterality: Left;    APPLICATION OF WOUND  VACUUM-ASSISTED CLOSURE DEVICE Left 08/06/2020    Procedure: APPLICATION, WOUND VAC;  Surgeon: SEMAJ Márquez III, MD;  Location: [facility] OR 2ND FLR;  Service: Peripheral Vascular;  Laterality: Left;    CARPAL TUNNEL RELEASE Right 06/10/2020    Procedure: RELEASE, CARPAL TUNNEL - RIGHT;  Surgeon: Adelaida Hall MD;  Location: [facility] OR;  Service: Orthopedics;  Laterality: Right;  GENERAL AND REGIONAL    CARPAL TUNNEL RELEASE Left 05/05/2021    Procedure: RELEASE, CARPAL TUNNEL, LEFT;  Surgeon: Adelaida Hall MD;  Location: [facility] OR;  Service: Orthopedics;  Laterality: Left;  GENERAL & REGIONAL IN PACU    CARPECTOMY Left 09/06/2023    Procedure: CARPECTOMY;  Surgeon: Adelaida Hall MD;  Location: [facility] OR;  Service: Orthopedics;  Laterality: Left;  MAC/REGIONAL  LEFT PROXIMAL ROW    CLOSURE OF WOUND Left 08/06/2020    Procedure: CLOSURE, WOUND;  Surgeon: SEMAJ Márquez III, MD;  Location: [facility] OR 2ND FLR;  Service: Peripheral Vascular;  Laterality: Left;  Complex    COLONOSCOPY N/A 08/17/2021    Procedure: COLONOSCOPY Suprep;  Surgeon: Loco Deluca MD;  Location: [facility];  Service: Endoscopy;  Laterality: N/A;    ECHOCARDIOGRAM,TRANSESOPHAGEAL N/A 07/24/2023    Procedure: Transesophageal echo (REENA) intra-procedure log documentation;  Surgeon: Leyla Diagnostic Provider;  Location: [facility] EP LAB;  Service: Cardiology;  Laterality: N/A;  s/p WM, REENA, anes, 3 Prep    ESOPHAGOGASTRODUODENOSCOPY N/A 08/17/2021    Procedure: EGD (ESOPHAGOGASTRODUODENOSCOPY);  Surgeon: Loco Deluca MD;  Location: [facility] ENDO;  Service: Endoscopy;  Laterality: N/A;  Patient is schedule to have her Covid test on 08/14/2021 at the [facility] @ 9:30am. [place].    EXPLORATION OF FEMORAL ARTERY Left 07/21/2020    Procedure: EXPLORATION, ARTERY, FEMORAL;  Surgeon: SEMAJ Márquez III, MD;  Location: [facility] OR 2ND FLR;  Service: Peripheral Vascular;  Laterality: Left;  1. Urgent Direct repair, L SFA branch  laceration    FOOT SURGERY      HYSTEROSCOPY WITH DILATION AND CURETTAGE OF UTERUS N/A 02/19/2022    Procedure: HYSTEROSCOPY, WITH DILATION AND CURETTAGE OF UTERUS;  Surgeon: Shane Palomo MD;  Location: [address];  Service: OB/GYN;  Laterality: N/A;    INCISION AND DRAINAGE OF KNEE Left 05/12/2022    Procedure: INCISION AND DRAINAGE, Prepatellar bursectomy.;  Surgeon: Dre Guzmán MD;  [address];  Service: Orthopedics;  Laterality: Left;    LEFT HEART CATHETERIZATION Left 02/07/2023    Procedure: Left heart cath;  Surgeon: Josiah Terry MD;  Location: [facility] CATH LAB;  Service: Cardiology;  Laterality: Left;  borderline moderate bleeding risk 6.3%    OCCLUSION OF LEFT ATRIAL APPENDAGE N/A 06/12/2023    Procedure: Left atrial appendage occlusion;  Surgeon: Gabriel Hawley MD;  Location: [facility] EP LAB;  Service: Cardiology;  Laterality: N/A;  afib, watchman, BSCI, demi, ALIYA ibarra, 3prep    RELEASE OF ULNAR NERVE AT CUBITAL TUNNEL Bilateral     RIGHT HEART CATHETERIZATION Right 08/05/2024    Procedure: INSERTION, CATHETER, RIGHT HEART;  Surgeon: Zane Liu MD;  Location: [facility] CATH LAB;  Service: Cardiology;  Laterality: Right;    ROBOT-ASSISTED LAPAROSCOPIC ABDOMINAL HYSTERECTOMY USING DA KEIRY XI N/A 08/09/2022    Procedure: XI ROBOTIC HYSTERECTOMY;  Surgeon: Yolanda Barriga MD;  Location: [facility];  Service: OB/GYN;  Laterality: N/A;  dual console requested    ROBOT-ASSISTED LAPAROSCOPIC SALPINGO-OOPHORECTOMY Bilateral 08/09/2022    Procedure: ROBOTIC SALPINGO-OOPHORECTOMY;  Surgeon: Yolanda Barriga MD;  Location: [facility];  Service: OB/GYN;  Laterality: Bilateral;    TRANSESOPHAGEAL ECHOCARDIOGRAPHY N/A 06/12/2023    Procedure: ECHOCARDIOGRAM, TRANSESOPHAGEAL;  Surgeon: Cyril Mast MD;  Location: [facility] EP LAB;  Service: Cardiology;  Laterality: N/A;    TREATMENT OF CARDIAC ARRHYTHMIA N/A 01/29/2020    Procedure: CARDIOVERSION;  Surgeon: Gabriel Hawley MD;  Location:  [facility] EP LAB;  Service: Cardiology;  Laterality: N/A;  af, demi, dccv, anes, mb, 345    TREATMENT OF CARDIAC ARRHYTHMIA  01/24/2022    Procedure: Cardioversion or Defibrillation;  Surgeon: Gabriel Hawley MD;  Location: [facility] EP LAB;  Service: Cardiology;;    WOUND DEBRIDEMENT Left 08/06/2020    Procedure: DEBRIDEMENT, WOUND;  Surgeon: SEMAJ Márquez III, MD;  Location: [facility] OR [address] [place] [address];  Service: Peripheral Vascular;  Laterality: Left;       Home Meds:   Prior to Admission medications    Medication Sig Start Date End Date Taking? Authorizing Provider   albuterol (VENTOLIN HFA) 90 mcg/actuation inhaler Inhale 2 puffs into the lungs every 6 (six) hours as needed for Wheezing. Rescue 1/8/25 1/8/26  Gordy Cordero MD   ALPRAZolam (XANAX) 0.5 MG tablet Take 1 tablet (0.5 mg total) by mouth 2 (two) times daily as needed for Anxiety. 2/4/25   Gordy Cordero MD   b complex vitamins capsule Take 1 capsule by mouth every other day.    Provider, Historical   DULoxetine (CYMBALTA) 60 MG capsule Take 1 capsule (60 mg total) by mouth once daily. 8/12/24   Brennon Nunez MD   ferrous sulfate 325 (65 FE) MG EC tablet Take 1 tablet (325 mg total) by mouth every other day. 9/18/24   Eugene Woodward MD   furosemide (LASIX) 20 MG tablet Take 1 tablet (20 mg total) by mouth once daily. 2/5/25 2/5/26  Gordy Cordero MD   ibuprofen (ADVIL,MOTRIN) 600 MG tablet Take 1 tablet (600 mg total) by mouth 2 (two) times daily as needed for Pain. 2/4/25   Gordy Cordero MD   lactulose (CONSTULOSE) 10 gram/15 mL solution Take 30 mLs (20 g total) by mouth 3 (three) times daily. Please ensure you are having at least 2-3 bowel movements every day. 4/3/25   Kiko Saba MD   metoprolol succinate (TOPROL-XL) 100 MG 24 hr tablet Take 1 tablet (100 mg total) by mouth 2 (two) times daily. 2/5/25 2/5/26  Marah Hunter, NP   multivitamin (THERAGRAN) per tablet Take 1 tablet by mouth once daily.    Provider, Historical   nystatin  (MYCOSTATIN) powder Apply topically 2 (two) times daily. 5/7/25   Gordy Cordero MD   omeprazole (PRILOSEC) 20 MG capsule TAKE 1 CAPSULE BY MOUTH ONCE DAILY 7/25/24   Gordy Cordero MD   ondansetron (ZOFRAN-ODT) 4 MG TbDL Take 2 tablets (8 mg total) by mouth every 8 (eight) hours as needed (nasuea, vomiting). 5/7/25   Gordy Cordero MD   oxyCODONE (ROXICODONE) 5 MG immediate release tablet Take 1 tablet (5 mg total) by mouth every 12 (twelve) hours as needed for Pain. 5/7/25   Gordy Cordero MD   spironolactone (ALDACTONE) 50 MG tablet Take 1 tablet (50 mg total) by mouth once daily. 11/12/24 6/11/25  Kiko Saba MD   traZODone (DESYREL) 100 MG tablet Take 100 mg by mouth every evening.    Wilson Jimenez   traZODone (DESYREL) 100 MG tablet Take 1 tablet (100 mg total) by mouth nightly as needed for Insomnia. 3/9/25   Gordy Cordero MD   medroxyPROGESTERone (PROVERA) 10 MG tablet Take 2 tablets (20 mg total) by mouth 3 (three) times daily. 7/13/22 8/9/22  Yolanda Barriga MD     Anticoagulants/Antiplatelets: no anticoagulation    Allergies:   Review of patient's allergies indicates:   Allergen Reactions    Flecainide Shortness Of Breath and Swelling    Shellfish containing products Other (See Comments)     Makes gout terrible    Vancomycin analogues Hives, Itching and Rash     Sedation History:  no adverse reactions    Review of Systems:   Hematological: no known coagulopathies  Respiratory: no shortness of breath  Cardiovascular: no chest pain  Gastrointestinal: no abdominal pain  Genito-Urinary: no dysuria  Musculoskeletal: negative  Neurological: no TIA or stroke symptoms         OBJECTIVE:     Vital Signs (Most Recent)  Pulse: 70 (05/15/25 0835)  Resp: 15 (05/15/25 0835)  BP: 139/84 (05/15/25 0835)  SpO2: 99 % (05/15/25 0835)    Physical Exam:  ASA: 2  Mallampati: n/a    General: no acute distress  Mental Status: alert and oriented to person, place and time  HEENT: normocephalic,  atraumatic  Chest: unlabored breathing  Heart: regular heart rate  Abdomen: distended  Extremity: moves all extremities    ASSESSMENT/PLAN:     Sedation Plan: Local  Patient will undergo US guided paracentesis.    Shannen Callahan PA-C  Interventional Radiology  [phone]

## 2025-05-15 NOTE — PROCEDURES
Radiology Post-Procedure Note    Pre Op Diagnosis: Ascites  Post Op Diagnosis: Same    Procedure: Ultrasound Guided Paracentesis    Procedure performed by: Shannen Callahan PA-C    Written Informed Consent Obtained: Yes  Specimen Removed: YES (3.1L Yellow, clear)  Estimated Blood Loss: Minimal    Findings:   Successful paracentesis from the left.  Albumin administered PRN per protocol.    Patient tolerated procedure well.    Shannen Callahan PA-C  Interventional Radiology  [phone]

## 2025-05-15 NOTE — PLAN OF CARE
Paracentesis completed, pt tolerated well. No s/s of complications noted. 3100 ml removed from LLQ, mepore applied CDI. No albumin given per protocol.  Discharge instructions reviewed and acknowledged by patient. Pt discharged via ambulation, steady gait observed.

## 2025-05-15 NOTE — Clinical Note
Left: Abdomen.   Scrubbed with ChloroPrep With Tint.    Hair: N/A.  Skin prep dry before draping.  Prepped by: Shannen Callahan PA-C 5/15/2025 9:13 AM.

## 2025-05-15 NOTE — DISCHARGE INSTRUCTIONS
Interventional Radiology Clinic   For complications   [phone]. Monday - Friday, 8:00 am - 4:00 pm    [phone] After hours and on holidays. Ask to speak with the interventional radiologist on call.     For Scheduling   [phone] Monday - Friday, 8:00 am - 4:00 pm

## 2025-05-19 ENCOUNTER — OFFICE VISIT (OUTPATIENT)
Dept: HEPATOLOGY | Facility: CLINIC | Age: 60
End: 2025-05-19
Attending: INTERNAL MEDICINE
Payer: MEDICAID

## 2025-05-19 ENCOUNTER — PATIENT MESSAGE (OUTPATIENT)
Dept: CARDIOLOGY | Facility: CLINIC | Age: 60
End: 2025-05-19
Payer: MEDICAID

## 2025-05-19 VITALS
TEMPERATURE: 97 F | RESPIRATION RATE: 18 BRPM | OXYGEN SATURATION: 98 % | BODY MASS INDEX: 40.97 KG/M2 | DIASTOLIC BLOOD PRESSURE: 63 MMHG | SYSTOLIC BLOOD PRESSURE: 143 MMHG | WEIGHT: 231.25 LBS | HEIGHT: 63 IN

## 2025-05-19 DIAGNOSIS — K76.0 METABOLIC DYSFUNCTION-ASSOCIATED STEATOTIC LIVER DISEASE (MASLD): Chronic | ICD-10-CM

## 2025-05-19 DIAGNOSIS — R18.8 CIRRHOSIS OF LIVER WITH ASCITES, UNSPECIFIED HEPATIC CIRRHOSIS TYPE: Primary | Chronic | ICD-10-CM

## 2025-05-19 DIAGNOSIS — I25.119 CORONARY ARTERY DISEASE INVOLVING NATIVE CORONARY ARTERY OF NATIVE HEART WITH ANGINA PECTORIS: Primary | ICD-10-CM

## 2025-05-19 DIAGNOSIS — K74.60 CIRRHOSIS OF LIVER WITH ASCITES, UNSPECIFIED HEPATIC CIRRHOSIS TYPE: Primary | Chronic | ICD-10-CM

## 2025-05-19 PROCEDURE — 3077F SYST BP >= 140 MM HG: CPT | Mod: CPTII,,, | Performed by: INTERNAL MEDICINE

## 2025-05-19 PROCEDURE — 99999 PR PBB SHADOW E&M-EST. PATIENT-LVL IV: CPT | Mod: PBBFAC,,, | Performed by: INTERNAL MEDICINE

## 2025-05-19 PROCEDURE — 1159F MED LIST DOCD IN RCRD: CPT | Mod: CPTII,,, | Performed by: INTERNAL MEDICINE

## 2025-05-19 PROCEDURE — 3078F DIAST BP <80 MM HG: CPT | Mod: CPTII,,, | Performed by: INTERNAL MEDICINE

## 2025-05-19 PROCEDURE — 99214 OFFICE O/P EST MOD 30 MIN: CPT | Mod: PBBFAC | Performed by: INTERNAL MEDICINE

## 2025-05-19 PROCEDURE — 99213 OFFICE O/P EST LOW 20 MIN: CPT | Mod: S$PBB,,, | Performed by: INTERNAL MEDICINE

## 2025-05-19 PROCEDURE — 3008F BODY MASS INDEX DOCD: CPT | Mod: CPTII,,, | Performed by: INTERNAL MEDICINE

## 2025-05-19 PROCEDURE — 1160F RVW MEDS BY RX/DR IN RCRD: CPT | Mod: CPTII,,, | Performed by: INTERNAL MEDICINE

## 2025-05-19 NOTE — PROGRESS NOTES
HEPATOLOGY FOLLOW UP    Referring Physician:   Current Corresponding Physician: Dr. Gordy Perry Virginie Bradenin is here for follow up of Cirrhosis and Ascites      HPI  This 60-year-old woman has decompensated cirrhosis secondary to metabolic dysfunction associated steatotic liver disease.  Her primary complication has been troublesome ascites.  She is now on a weekly large volume paracentesis schedule in addition to combination diuretics in the form of Lasix and furosemide.   Her hepatic encephalopathy is well-controlled on her current medication regimen.  Her liver synthetic function is reasonably well preserved.  She has had no symptoms of GI bleeding.   A dedicated abdominal ultrasound showed no liver lesions and mild-to-moderate ascites.  Outpatient Encounter Medications as of 5/19/2025   Medication Sig Dispense Refill    albuterol (VENTOLIN HFA) 90 mcg/actuation inhaler Inhale 2 puffs into the lungs every 6 (six) hours as needed for Wheezing. Rescue 18 g 6    ALPRAZolam (XANAX) 0.5 MG tablet Take 1 tablet (0.5 mg total) by mouth 2 (two) times daily as needed for Anxiety. 60 tablet 4    b complex vitamins capsule Take 1 capsule by mouth every other day.      DULoxetine (CYMBALTA) 60 MG capsule Take 1 capsule (60 mg total) by mouth once daily. 180 capsule 3    ferrous sulfate 325 (65 FE) MG EC tablet Take 1 tablet (325 mg total) by mouth every other day. 15 tablet 2    furosemide (LASIX) 20 MG tablet Take 1 tablet (20 mg total) by mouth once daily. 90 tablet 3    ibuprofen (ADVIL,MOTRIN) 600 MG tablet Take 1 tablet (600 mg total) by mouth 2 (two) times daily as needed for Pain. 180 tablet 2    lactulose (CONSTULOSE) 10 gram/15 mL solution Take 30 mLs (20 g total) by mouth 3 (three) times daily. Please ensure you are having at least 2-3 bowel movements every day. 900 mL 6    metoprolol succinate (TOPROL-XL) 100 MG 24 hr tablet Take 1 tablet (100 mg total) by mouth 2 (two) times daily. 180 tablet 3     multivitamin (THERAGRAN) per tablet Take 1 tablet by mouth once daily.      nystatin (MYCOSTATIN) powder Apply topically 2 (two) times daily. 60 g 1    omeprazole (PRILOSEC) 20 MG capsule TAKE 1 CAPSULE BY MOUTH ONCE DAILY 90 capsule 3    ondansetron (ZOFRAN-ODT) 4 MG TbDL Take 2 tablets (8 mg total) by mouth every 8 (eight) hours as needed (nasuea, vomiting). 30 tablet 1    oxyCODONE (ROXICODONE) 5 MG immediate release tablet Take 1 tablet (5 mg total) by mouth every 12 (twelve) hours as needed for Pain. 40 tablet 0    spironolactone (ALDACTONE) 50 MG tablet Take 1 tablet (50 mg total) by mouth once daily. 30 tablet 6    traZODone (DESYREL) 100 MG tablet Take 100 mg by mouth every evening.      traZODone (DESYREL) 100 MG tablet Take 1 tablet (100 mg total) by mouth nightly as needed for Insomnia. 90 tablet 3    [DISCONTINUED] medroxyPROGESTERone (PROVERA) 10 MG tablet Take 2 tablets (20 mg total) by mouth 3 (three) times daily. 180 tablet 0    [DISCONTINUED] nystatin (MYCOSTATIN) powder Apply topically 2 (two) times daily. 60 g 1    [DISCONTINUED] ondansetron (ZOFRAN-ODT) 4 MG TbDL Take 2 tablets (8 mg total) by mouth every 8 (eight) hours as needed (nasuea, vomiting). 30 tablet 1    [DISCONTINUED] oxyCODONE (ROXICODONE) 5 MG immediate release tablet Take 1 tablet (5 mg total) by mouth every 12 (twelve) hours as needed for Pain. 40 tablet 0     No facility-administered encounter medications on file as of 5/19/2025.     Review of patient's allergies indicates:   Allergen Reactions    Flecainide Shortness Of Breath and Swelling    Shellfish containing products Other (See Comments)     Makes gout terrible    Vancomycin analogues Hives, Itching and Rash     Past Medical History:   Diagnosis Date    Anticoagulant long-term use     Arthritis     Atrial fibrillation     cardioversion    Atrial fibrillation with RVR     HAS WATCHMAN IN PLACE NOW    Avascular necrosis     L hand    Cellulitis of extremity 05/07/2022    CHF  (congestive heart failure)     Chronic fatigue     Cirrhosis of liver with ascites     Depression     Diabetes mellitus     Encounter for blood transfusion 07/22/2020    Encounter for blood transfusion 03/2022    Fatty liver     GERD (gastroesophageal reflux disease)     Hx of psychiatric care     Hypertension     Iron deficiency anemia 05/07/2022    TIBC 444 with saturated iron 8    Left leg cellulitis 05/07/2022    Liver disease     Obese     Pre-diabetes 05/07/2022    Psychiatric problem     Sleep apnea     wears cpap    SOB (shortness of breath) on exertion     Weight loss     75lb intentional weight loss       Review of Systems   Constitutional:  Positive for fatigue. Negative for appetite change and unexpected weight change.   HENT:  Negative for ear pain, hearing loss, sore throat and trouble swallowing.    Eyes:  Negative for visual disturbance.   Respiratory:  Negative for cough and shortness of breath.    Cardiovascular:  Negative for chest pain and palpitations.   Gastrointestinal:  Positive for abdominal distention. Negative for abdominal pain, nausea and vomiting.   Genitourinary:  Negative for difficulty urinating and dysuria.   Musculoskeletal:  Negative for arthralgias and back pain.   Skin:  Negative for rash.   Neurological:  Positive for weakness. Negative for tremors, seizures and headaches.   Psychiatric/Behavioral:  Negative for agitation and decreased concentration.      Vitals:    05/19/25 0928   BP: (!) 143/63   Resp: 18   Temp: 97 °F (36.1 °C)       Physical Exam  Constitutional:       Appearance: She is well-developed. She is not diaphoretic.   HENT:      Right Ear: External ear normal.      Left Ear: External ear normal.   Eyes:      General: No scleral icterus.  Cardiovascular:      Rate and Rhythm: Normal rate and regular rhythm.      Heart sounds: Normal heart sounds. No murmur heard.     No friction rub. No gallop.   Pulmonary:      Effort: Pulmonary effort is normal. No respiratory  distress.      Breath sounds: Normal breath sounds. No wheezing.   Abdominal:      General: Bowel sounds are normal. There is no distension.      Palpations: Abdomen is soft. There is shifting dullness and fluid wave. There is no mass.      Tenderness: There is no abdominal tenderness.   Musculoskeletal:         General: Normal range of motion.   Lymphadenopathy:      Cervical: No cervical adenopathy.   Skin:     General: Skin is warm and dry.      Coloration: Skin is not pale.   Neurological:      Mental Status: She is alert and oriented to person, place, and time.         MELD 3.0: 9 at 5/8/2025 10:26 AM  MELD-Na: 7 at 5/8/2025 10:26 AM  Calculated from:  Serum Creatinine: 1.1 mg/dL at 5/8/2025 10:26 AM  Serum Sodium: 137 mmol/L at 5/8/2025 10:26 AM  Total Bilirubin: 0.4 mg/dL (Using min of 1 mg/dL) at 5/8/2025 10:26 AM  Serum Albumin: 3.2 g/dL at 5/8/2025 10:26 AM  INR(ratio): 1 at 5/8/2025 10:26 AM  Age at listing (hypothetical): 60 years  Sex: Female at 5/8/2025 10:26 AM      Lab Results   Component Value Date     05/08/2025     (H) 03/14/2025    BUN 21 05/08/2025    CREATININE 1.1 05/08/2025    CALCIUM 9.1 05/08/2025    CALCIUM 8.7 03/14/2025     05/08/2025     (L) 03/14/2025    K 4.7 05/08/2025    K 4.9 03/14/2025     05/08/2025     03/14/2025    PROT 6.8 05/08/2025    PROT 6.3 03/14/2025    CO2 24 05/08/2025    CO2 23 03/14/2025    ANIONGAP 9 05/08/2025    WBC 6.47 05/08/2025    RBC 4.47 05/08/2025    RBC 3.77 (L) 03/14/2025    HGB 11.6 (L) 05/08/2025    HGB 10.3 (L) 03/14/2025    HCT 38.5 05/08/2025    HCT 31.8 (L) 03/14/2025    HCT 27 (L) 09/10/2024    MCV 86 05/08/2025    MCV 84 03/14/2025    MCH 26.0 (L) 05/08/2025    MCHC 30.1 (L) 05/08/2025    MCHC 32.4 03/14/2025     Lab Results   Component Value Date    RDW 15.1 (H) 05/08/2025    RDW 15.3 (H) 03/14/2025     05/08/2025     03/14/2025    MPV 11.6 05/08/2025    GRAN 2.9 03/14/2025    GRAN 62.8  03/14/2025    LYMPH 16.2 (L) 05/08/2025    LYMPH 1.05 05/08/2025    LYMPH 0.8 (L) 03/14/2025    LYMPH 18.3 03/14/2025    MONO 7.7 05/08/2025    MONO 0.50 05/08/2025    MONO 0.6 03/14/2025    MONO 12.1 03/14/2025    EOSINOPHIL 5.3 03/14/2025    BASOPHIL 1.1 05/08/2025    BASOPHIL 0.07 05/08/2025    BASOPHIL 1.1 03/14/2025    EOS 5.6 05/08/2025    EOS 0.36 05/08/2025    EOS 0.2 03/14/2025    EOS 1 09/17/2024    BASO 0.05 03/14/2025    APTT 29.2 06/09/2023    GROUPTRH O NEG 06/12/2023     (H) 09/10/2024    CHOL 110 (L) 12/20/2023    TRIG 144 12/20/2023    HDL 31 (L) 12/20/2023    CHOLHDL 28.2 12/20/2023    TOTALCHOLEST 3.5 12/20/2023    ALBUMIN 3.2 (L) 05/08/2025    ALBUMIN 2.8 (L) 03/14/2025    BILIDIR 0.8 (H) 08/02/2024    AST 20 05/08/2025    AST 19 03/14/2025    ALT 8 (L) 05/08/2025    ALT 8 (L) 03/14/2025    ALKPHOS 165 (H) 05/08/2025    ALKPHOS 162 (H) 03/14/2025    MG 1.7 09/18/2024    LABPROT 10.5 03/14/2025    INR 1.0 05/08/2025    INR 1.0 03/14/2025       Assessment and Plan:  Patient Active Problem List   Diagnosis    Opiate dependence    Opioid dependence in remission    Essential hypertension    Atrial Fibrillation (paroxysmal)    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    History of opioid abuse    Type 2 diabetes mellitus with diabetic polyneuropathy, without long-term current use of insulin    Right carpal tunnel syndrome    Weakness of right hand    ERENDIRA (obstructive sleep apnea)    Depression    Carpal tunnel syndrome    Ulnar neuropathy at elbow of left upper extremity    Range of motion deficit    Edema of hand    Pure hypercholesterolemia    Mild aortic stenosis    Microcytic anemia    Colon cancer screening    Vaginal bleeding    Gastroesophageal reflux disease without esophagitis    Recurrent major depressive disorder, in partial remission    Iron deficiency anemia    Volume overload    s/p RA-TLH/BSO    Laryngeal edema    Abnormal nuclear stress test    Elevated liver  enzymes    Metabolic dysfunction-associated steatotic liver disease (MASLD)    Primary osteoarthritis of both knees    Left wrist pain    Stiffness of left wrist joint    Swelling of left wrist    Portal hypertension    Stage 3b chronic kidney disease    Coronary artery disease involving native coronary artery of native heart with angina pectoris    Anasarca    Irritant contact dermatitis due to fecal, urinary or dual incontinence    Hyponatremia    Anxiety    Pain syndrome, chronic    Cirrhosis of liver with ascites    Other ascites    Debility    Decreased strength of lower extremity    Decreased strength of upper extremity    Decreased functional mobility    Decreased functional activity tolerance    Presence of Watchman left atrial appendage closure device    NAPIER (dyspnea on exertion)     Vicky Alvarado is a 60 y.o. female withCirrhosis and Ascites    Impression:  Decompensated cirrhosis secondary to metabolic dysfunction associated steatotic liver disease complicated by troublesome ascites    Plan:  She will return to clinic in 6 months time with continued clinical, biochemical and ultrasound surveillance.  I have reordered her weekly paracenteses.

## 2025-05-21 RX ORDER — ATORVASTATIN CALCIUM 80 MG/1
80 TABLET, FILM COATED ORAL DAILY
Qty: 90 TABLET | Refills: 3 | Status: SHIPPED | OUTPATIENT
Start: 2025-05-21 | End: 2026-05-21

## 2025-05-22 ENCOUNTER — HOSPITAL ENCOUNTER (OUTPATIENT)
Dept: INTERVENTIONAL RADIOLOGY/VASCULAR | Facility: HOSPITAL | Age: 60
Discharge: HOME | End: 2025-05-22
Attending: INTERNAL MEDICINE
Payer: MEDICAID

## 2025-05-22 VITALS
OXYGEN SATURATION: 100 % | RESPIRATION RATE: 18 BRPM | SYSTOLIC BLOOD PRESSURE: 127 MMHG | DIASTOLIC BLOOD PRESSURE: 61 MMHG | HEART RATE: 60 BPM

## 2025-05-22 DIAGNOSIS — K74.60 CIRRHOSIS OF LIVER WITH ASCITES, UNSPECIFIED HEPATIC CIRRHOSIS TYPE: Chronic | ICD-10-CM

## 2025-05-22 DIAGNOSIS — R18.8 CIRRHOSIS OF LIVER WITH ASCITES, UNSPECIFIED HEPATIC CIRRHOSIS TYPE: Chronic | ICD-10-CM

## 2025-05-22 PROCEDURE — C1729 CATH, DRAINAGE: HCPCS

## 2025-05-22 NOTE — H&P
Radiology History & Physical      SUBJECTIVE:     Chief Complaint: abdominal distention    History of Present Illness:  Vicky Alvarado is a 60 y.o. female who presents for ultrasound guided paracentesis  Past Medical History:   Diagnosis Date    Anticoagulant long-term use     Arthritis     Atrial fibrillation     cardioversion    Atrial fibrillation with RVR     HAS WATCHMAN IN PLACE NOW    Avascular necrosis     L hand    Cellulitis of extremity 05/07/2022    CHF (congestive heart failure)     Chronic fatigue     Cirrhosis of liver with ascites     Depression     Diabetes mellitus     Encounter for blood transfusion 07/22/2020    Encounter for blood transfusion 03/2022    Fatty liver     GERD (gastroesophageal reflux disease)     Hx of psychiatric care     Hypertension     Iron deficiency anemia 05/07/2022    TIBC 444 with saturated iron 8    Left leg cellulitis 05/07/2022    Liver disease     Obese     Pre-diabetes 05/07/2022    Psychiatric problem     Sleep apnea     wears cpap    SOB (shortness of breath) on exertion     Weight loss     75lb intentional weight loss     Past Surgical History:   Procedure Laterality Date    ABLATION OF ARRHYTHMOGENIC FOCUS FOR ATRIAL FIBRILLATION N/A 07/20/2020    Procedure: Ablation atrial fibrillation;  Surgeon: Gabriel Hawley MD;  Location: [facility] EP LAB;  Service: Cardiology;  Laterality: N/A;  afib, PVI, RFA, REENA, SHADY, anes, MB, 3 Prep    ABLATION OF ARRHYTHMOGENIC FOCUS FOR ATRIAL FIBRILLATION N/A 01/24/2022    Procedure: Ablation atrial fibrillation;  Surgeon: Gabriel Hawley MD;  Location: [facility] EP LAB;  Service: Cardiology;  Laterality: N/A;  afib/afl, PVI (re-do)/CTI, RFA, REENA (cx if SR), SHADY, anes, MB, 3 Prep    APPLICATION OF WOUND VACUUM-ASSISTED CLOSURE DEVICE Left 08/03/2020    Procedure: APPLICATION, WOUND VAC;  Surgeon: SEMAJ Márquez III, MD;  Location: [facility] OR [address] [place] [address];  Service: Peripheral Vascular;  Laterality: Left;    APPLICATION OF WOUND  VACUUM-ASSISTED CLOSURE DEVICE Left 08/06/2020    Procedure: APPLICATION, WOUND VAC;  Surgeon: SEMAJ Márquez III, MD;  Location: [facility] OR 2ND FLR;  Service: Peripheral Vascular;  Laterality: Left;    CARPAL TUNNEL RELEASE Right 06/10/2020    Procedure: RELEASE, CARPAL TUNNEL - RIGHT;  Surgeon: Adelaida Hall MD;  Location: [facility] OR;  Service: Orthopedics;  Laterality: Right;  GENERAL AND REGIONAL    CARPAL TUNNEL RELEASE Left 05/05/2021    Procedure: RELEASE, CARPAL TUNNEL, LEFT;  Surgeon: Adelaida Hall MD;  Location: [facility] OR;  Service: Orthopedics;  Laterality: Left;  GENERAL & REGIONAL IN PACU    CARPECTOMY Left 09/06/2023    Procedure: CARPECTOMY;  Surgeon: Adelaida Hall MD;  Location: [facility] OR;  Service: Orthopedics;  Laterality: Left;  MAC/REGIONAL  LEFT PROXIMAL ROW    CLOSURE OF WOUND Left 08/06/2020    Procedure: CLOSURE, WOUND;  Surgeon: SEMAJ Márquez III, MD;  Location: [facility] OR 2ND FLR;  Service: Peripheral Vascular;  Laterality: Left;  Complex    COLONOSCOPY N/A 08/17/2021    Procedure: COLONOSCOPY Suprep;  Surgeon: Loco Deluca MD;  Location: [facility];  Service: Endoscopy;  Laterality: N/A;    ECHOCARDIOGRAM,TRANSESOPHAGEAL N/A 07/24/2023    Procedure: Transesophageal echo (REENA) intra-procedure log documentation;  Surgeon: Leyla Diagnostic Provider;  Location: [facility] EP LAB;  Service: Cardiology;  Laterality: N/A;  s/p WM, REENA, anes, 3 Prep    ESOPHAGOGASTRODUODENOSCOPY N/A 08/17/2021    Procedure: EGD (ESOPHAGOGASTRODUODENOSCOPY);  Surgeon: Loco Deluca MD;  Location: [facility] ENDO;  Service: Endoscopy;  Laterality: N/A;  Patient is schedule to have her Covid test on 08/14/2021 at the [facility] @ 9:30am. [place].    EXPLORATION OF FEMORAL ARTERY Left 07/21/2020    Procedure: EXPLORATION, ARTERY, FEMORAL;  Surgeon: SEMAJ Márquez III, MD;  Location: [facility] OR 2ND FLR;  Service: Peripheral Vascular;  Laterality: Left;  1. Urgent Direct repair, L SFA branch  laceration    FOOT SURGERY      HYSTEROSCOPY WITH DILATION AND CURETTAGE OF UTERUS N/A 02/19/2022    Procedure: HYSTEROSCOPY, WITH DILATION AND CURETTAGE OF UTERUS;  Surgeon: Shane Palomo MD;  Location: [address];  Service: OB/GYN;  Laterality: N/A;    INCISION AND DRAINAGE OF KNEE Left 05/12/2022    Procedure: INCISION AND DRAINAGE, Prepatellar bursectomy.;  Surgeon: Dre Guzmán MD;  [address];  Service: Orthopedics;  Laterality: Left;    LEFT HEART CATHETERIZATION Left 02/07/2023    Procedure: Left heart cath;  Surgeon: Josiah Terry MD;  Location: [facility] CATH LAB;  Service: Cardiology;  Laterality: Left;  borderline moderate bleeding risk 6.3%    OCCLUSION OF LEFT ATRIAL APPENDAGE N/A 06/12/2023    Procedure: Left atrial appendage occlusion;  Surgeon: Gabriel Hawley MD;  Location: [facility] EP LAB;  Service: Cardiology;  Laterality: N/A;  afib, watchman, BSCI, demi, ALIYA ibarra, 3prep    RELEASE OF ULNAR NERVE AT CUBITAL TUNNEL Bilateral     RIGHT HEART CATHETERIZATION Right 08/05/2024    Procedure: INSERTION, CATHETER, RIGHT HEART;  Surgeon: Zane Liu MD;  Location: [facility] CATH LAB;  Service: Cardiology;  Laterality: Right;    ROBOT-ASSISTED LAPAROSCOPIC ABDOMINAL HYSTERECTOMY USING DA KEIRY XI N/A 08/09/2022    Procedure: XI ROBOTIC HYSTERECTOMY;  Surgeon: Yolanda Barriga MD;  Location: [facility];  Service: OB/GYN;  Laterality: N/A;  dual console requested    ROBOT-ASSISTED LAPAROSCOPIC SALPINGO-OOPHORECTOMY Bilateral 08/09/2022    Procedure: ROBOTIC SALPINGO-OOPHORECTOMY;  Surgeon: Yolanda Barriga MD;  Location: [facility];  Service: OB/GYN;  Laterality: Bilateral;    TRANSESOPHAGEAL ECHOCARDIOGRAPHY N/A 06/12/2023    Procedure: ECHOCARDIOGRAM, TRANSESOPHAGEAL;  Surgeon: Cyril Mast MD;  Location: [facility] EP LAB;  Service: Cardiology;  Laterality: N/A;    TREATMENT OF CARDIAC ARRHYTHMIA N/A 01/29/2020    Procedure: CARDIOVERSION;  Surgeon: Gabriel Hawley MD;  Location:  [facility] EP LAB;  Service: Cardiology;  Laterality: N/A;  af, demi, dccv, anes, mb, 345    TREATMENT OF CARDIAC ARRHYTHMIA  01/24/2022    Procedure: Cardioversion or Defibrillation;  Surgeon: Gabriel Hawley MD;  Location: [facility] EP LAB;  Service: Cardiology;;    WOUND DEBRIDEMENT Left 08/06/2020    Procedure: DEBRIDEMENT, WOUND;  Surgeon: SEMAJ Márquez III, MD;  Location: [facility] OR [address] [place] [address];  Service: Peripheral Vascular;  Laterality: Left;       Home Meds:   Prior to Admission medications    Medication Sig Start Date End Date Taking? Authorizing Provider   albuterol (VENTOLIN HFA) 90 mcg/actuation inhaler Inhale 2 puffs into the lungs every 6 (six) hours as needed for Wheezing. Rescue 1/8/25 1/8/26  Gordy Cordeor MD   ALPRAZolam (XANAX) 0.5 MG tablet Take 1 tablet (0.5 mg total) by mouth 2 (two) times daily as needed for Anxiety. 2/4/25   Gordy Cordero MD   atorvastatin (LIPITOR) 80 MG tablet Take 1 tablet (80 mg total) by mouth once daily. 5/21/25 5/21/26  Niesha Brumfield PA-C   b complex vitamins capsule Take 1 capsule by mouth every other day.    Provider, Historical   DULoxetine (CYMBALTA) 60 MG capsule Take 1 capsule (60 mg total) by mouth once daily. 8/12/24   Brennon Nunez MD   ferrous sulfate 325 (65 FE) MG EC tablet Take 1 tablet (325 mg total) by mouth every other day. 9/18/24   Eugene Woodward MD   furosemide (LASIX) 20 MG tablet Take 1 tablet (20 mg total) by mouth once daily. 2/5/25 2/5/26  Gordy Cordero MD   ibuprofen (ADVIL,MOTRIN) 600 MG tablet Take 1 tablet (600 mg total) by mouth 2 (two) times daily as needed for Pain. 2/4/25   Gordy Cordero MD   lactulose (CONSTULOSE) 10 gram/15 mL solution Take 30 mLs (20 g total) by mouth 3 (three) times daily. Please ensure you are having at least 2-3 bowel movements every day. 4/3/25   Kiko Saba MD   metoprolol succinate (TOPROL-XL) 100 MG 24 hr tablet Take 1 tablet (100 mg total) by mouth 2 (two) times daily. 2/5/25 2/5/26  Dale  Marah HAIR NP   multivitamin (THERAGRAN) per tablet Take 1 tablet by mouth once daily.    Provider, Historical   nystatin (MYCOSTATIN) powder Apply topically 2 (two) times daily. 5/7/25   Gordy Cordero MD   omeprazole (PRILOSEC) 20 MG capsule TAKE 1 CAPSULE BY MOUTH ONCE DAILY 7/25/24   Gordy Cordero MD   ondansetron (ZOFRAN-ODT) 4 MG TbDL Take 2 tablets (8 mg total) by mouth every 8 (eight) hours as needed (nasuea, vomiting). 5/7/25   Gordy Cordero MD   oxyCODONE (ROXICODONE) 5 MG immediate release tablet Take 1 tablet (5 mg total) by mouth every 12 (twelve) hours as needed for Pain. 5/7/25   Gordy Cordero MD   spironolactone (ALDACTONE) 50 MG tablet Take 1 tablet (50 mg total) by mouth once daily. 11/12/24 6/11/25  Kiko Saba MD   traZODone (DESYREL) 100 MG tablet Take 100 mg by mouth every evening.    Provider, Historical   traZODone (DESYREL) 100 MG tablet Take 1 tablet (100 mg total) by mouth nightly as needed for Insomnia. 3/9/25   Gordy Cordero MD   medroxyPROGESTERone (PROVERA) 10 MG tablet Take 2 tablets (20 mg total) by mouth 3 (three) times daily. 7/13/22 8/9/22  Yolanda Barriga MD     Anticoagulants/Antiplatelets: no anticoagulation    Allergies:   Review of patient's allergies indicates:   Allergen Reactions    Flecainide Shortness Of Breath and Swelling    Shellfish containing products Other (See Comments)     Makes gout terrible    Vancomycin analogues Hives, Itching and Rash     Sedation History:  no adverse reactions    Review of Systems:   Hematological: no known coagulopathies  Respiratory: no shortness of breath  Cardiovascular: no chest pain  Gastrointestinal: no abdominal pain  Genito-Urinary: no dysuria  Musculoskeletal: negative  Neurological: no TIA or stroke symptoms         OBJECTIVE:     Vital Signs (Most Recent)  Pulse: 68 (05/22/25 0825)  Resp: 16 (05/22/25 0825)  BP: 138/60 (05/22/25 0825)  SpO2: 100 % (05/22/25 0825)    Physical Exam:  ASA: 2  Mallampati:  n/a    General: no acute distress  Mental Status: alert and oriented to person, place and time  HEENT: normocephalic, atraumatic  Chest: unlabored breathing  Heart: regular heart rate  Abdomen: distended  Extremity: moves all extremities    ASSESSMENT/PLAN:     Sedation Plan: local  Patient will undergo ultrasound guided paracentesis.    Bushra Epps PA-C  Interventional Radiology   Spectra: [phone]

## 2025-05-22 NOTE — PROCEDURES
Radiology Post-Procedure Note    Pre Op Diagnosis: Ascites  Post Op Diagnosis: Same    Procedure: Ultrasound Guided Paracentesis    Procedure performed by: Bushra Epps PA-C    Written Informed Consent Obtained: Yes  Specimen Removed: YES, clear yellow fluid  Estimated Blood Loss: Minimal    Findings:   Successful paracentesis. Access obtained in the LUQ. Albumin administered PRN per protocol.    Patient tolerated procedure well.    Bushra Epps PA-C  Interventional Radiology   Spectra: [phone]

## 2025-05-29 ENCOUNTER — RESULTS FOLLOW-UP (OUTPATIENT)
Dept: CARDIOLOGY | Facility: CLINIC | Age: 60
End: 2025-05-29

## 2025-05-29 ENCOUNTER — HOSPITAL ENCOUNTER (OUTPATIENT)
Dept: CARDIOLOGY | Facility: HOSPITAL | Age: 60
Discharge: HOME | End: 2025-05-29
Payer: MEDICAID

## 2025-05-29 VITALS
DIASTOLIC BLOOD PRESSURE: 70 MMHG | HEART RATE: 57 BPM | HEIGHT: 63 IN | BODY MASS INDEX: 40.93 KG/M2 | WEIGHT: 231 LBS | SYSTOLIC BLOOD PRESSURE: 138 MMHG

## 2025-05-29 VITALS
BODY MASS INDEX: 40.93 KG/M2 | HEART RATE: 70 BPM | HEIGHT: 63 IN | WEIGHT: 231 LBS | SYSTOLIC BLOOD PRESSURE: 138 MMHG | DIASTOLIC BLOOD PRESSURE: 70 MMHG

## 2025-05-29 DIAGNOSIS — R06.09 DOE (DYSPNEA ON EXERTION): ICD-10-CM

## 2025-05-29 DIAGNOSIS — I35.0 MILD AORTIC STENOSIS: Chronic | ICD-10-CM

## 2025-05-29 DIAGNOSIS — I25.119 CORONARY ARTERY DISEASE INVOLVING NATIVE CORONARY ARTERY OF NATIVE HEART WITH ANGINA PECTORIS: ICD-10-CM

## 2025-05-29 LAB
AORTIC SIZE INDEX (SOV): 1.5 CM/M2
AORTIC SIZE INDEX: 1.8 CM/M2
ASCENDING AORTA: 3.8 CM
AV AREA BY CONTINUOUS VTI: 1.6 CM2
AV INDEX (PROSTH): 0.54
AV LVOT MEAN GRADIENT: 3 MMHG
AV LVOT PEAK GRADIENT: 5 MMHG
AV MEAN GRADIENT: 10 MMHG
AV PEAK GRADIENT: 21 MMHG
AV VALVE AREA BY VELOCITY RATIO: 1.5 CM²
AV VALVE AREA: 1.5 CM2
AV VELOCITY RATIO: 0.52
BSA FOR ECHO PROCEDURE: 2.16 M2
CV ECHO LV RWT: 0.39 CM
DOP CALC AO PEAK VEL: 2.3 M/S
DOP CALC AO VTI: 51.4 CM
DOP CALC LVOT AREA: 2.8 CM2
DOP CALC LVOT DIAMETER: 1.9 CM
DOP CALC LVOT PEAK VEL: 1.2 M/S
DOP CALC LVOT STROKE VOLUME: 78.5 CM3
DOP CALCLVOT PEAK VEL VTI: 27.7 CM
E WAVE DECELERATION TIME: 217 MS
E/A RATIO: 1.44
E/E' RATIO: 8 M/S
ECHO EF ESTIMATED: 57 %
ECHO LV POSTERIOR WALL: 1 CM (ref 0.6–1.1)
FRACTIONAL SHORTENING: 29.4 % (ref 28–44)
INTERVENTRICULAR SEPTUM: 0.9 CM (ref 0.6–1.1)
IVC DIAMETER: 1.79 CM
IVRT: 88 MS
LA MAJOR: 6.4 CM
LA MINOR: 6.3 CM
LA WIDTH: 4.7 CM
LEFT ATRIUM SIZE: 4.2 CM
LEFT ATRIUM VOLUME INDEX MOD: 47 ML/M2
LEFT ATRIUM VOLUME INDEX: 52 ML/M2
LEFT ATRIUM VOLUME MOD: 97 ML
LEFT ATRIUM VOLUME: 107 CM3
LEFT INTERNAL DIMENSION IN SYSTOLE: 3.6 CM (ref 2.1–4)
LEFT VENTRICLE DIASTOLIC VOLUME INDEX: 59.71 ML/M2
LEFT VENTRICLE DIASTOLIC VOLUME: 123 ML
LEFT VENTRICLE MASS INDEX: 85.2 G/M2
LEFT VENTRICLE SYSTOLIC VOLUME INDEX: 25.7 ML/M2
LEFT VENTRICLE SYSTOLIC VOLUME: 53 ML
LEFT VENTRICULAR INTERNAL DIMENSION IN DIASTOLE: 5.1 CM (ref 3.5–6)
LEFT VENTRICULAR MASS: 175.6 G
LV LATERAL E/E' RATIO: 6.3
LV SEPTAL E/E' RATIO: 9.4
MV A" WAVE DURATION": 178.88 MS
MV PEAK A VEL: 0.52 M/S
MV PEAK E VEL: 0.75 M/S
OHS CV RV/LV RATIO: 0.73 CM
PISA TR MAX VEL: 2.3 M/S
PULM VEIN A" WAVE DURATION": 178.88 MS
PULM VEIN S/D RATIO: 0.48
PULMONIC VEIN PEAK A VELOCITY: 0.2 M/S
PV PEAK D VEL: 0.69 M/S
PV PEAK S VEL: 0.33 M/S
RA MAJOR: 5.02 CM
RA PRESSURE ESTIMATED: 3 MMHG
RA WIDTH: 3.28 CM
RIGHT ATRIAL AREA: 15.4 CM2
RIGHT VENTRICLE DIASTOLIC BASEL DIMENSION: 3.7 CM
RV TB RVSP: 5 MMHG
RV TISSUE DOPPLER FREE WALL SYSTOLIC VELOCITY 1 (APICAL 4 CHAMBER VIEW): 12.15 CM/S
SINUS: 3.11 CM
STJ: 2.7 CM
TDI LATERAL: 0.12 M/S
TDI SEPTAL: 0.08 M/S
TDI: 0.1 M/S
TRICUSPID ANNULAR PLANE SYSTOLIC EXCURSION: 2.5 CM
TV PEAK GRADIENT: 20 MMHG
TV REST PULMONARY ARTERY PRESSURE: 24 MMHG
Z-SCORE OF LEFT VENTRICULAR DIMENSION IN END DIASTOLE: -1.99
Z-SCORE OF LEFT VENTRICULAR DIMENSION IN END SYSTOLE: -0.44

## 2025-05-29 PROCEDURE — 78431 MYOCRD IMG PET RST&STRS CT: CPT | Mod: 26,,, | Performed by: INTERNAL MEDICINE

## 2025-05-29 PROCEDURE — 63600175 PHARM REV CODE 636 W HCPCS

## 2025-05-29 PROCEDURE — 93016 CV STRESS TEST SUPVJ ONLY: CPT | Mod: ,,, | Performed by: INTERNAL MEDICINE

## 2025-05-29 PROCEDURE — 78434 AQMBF PET REST & RX STRESS: CPT | Mod: 26,,, | Performed by: INTERNAL MEDICINE

## 2025-05-29 PROCEDURE — 93306 TTE W/DOPPLER COMPLETE: CPT | Mod: 26,,, | Performed by: STUDENT IN AN ORGANIZED HEALTH CARE EDUCATION/TRAINING PROGRAM

## 2025-05-29 PROCEDURE — 93306 TTE W/DOPPLER COMPLETE: CPT

## 2025-05-29 PROCEDURE — 93018 CV STRESS TEST I&R ONLY: CPT | Mod: ,,, | Performed by: INTERNAL MEDICINE

## 2025-05-29 PROCEDURE — 78434 AQMBF PET REST & RX STRESS: CPT

## 2025-05-29 PROCEDURE — A9555 RB82 RUBIDIUM: HCPCS

## 2025-05-29 RX ORDER — AMINOPHYLLINE 25 MG/ML
75 INJECTION, SOLUTION INTRAVENOUS ONCE
Status: COMPLETED | OUTPATIENT
Start: 2025-05-29 | End: 2025-05-29

## 2025-05-29 RX ORDER — REGADENOSON 0.08 MG/ML
0.4 INJECTION, SOLUTION INTRAVENOUS ONCE
Status: COMPLETED | OUTPATIENT
Start: 2025-05-29 | End: 2025-05-29

## 2025-05-29 RX ADMIN — RUBIDIUM CHLORIDE RB-82 31.8 MILLICURIE: 150 INJECTION, SOLUTION INTRAVENOUS at 11:05

## 2025-05-29 RX ADMIN — RUBIDIUM CHLORIDE RB-82 31.9 MILLICURIE: 150 INJECTION, SOLUTION INTRAVENOUS at 11:05

## 2025-05-29 RX ADMIN — REGADENOSON 0.4 MG: 0.08 INJECTION, SOLUTION INTRAVENOUS at 12:05

## 2025-05-29 RX ADMIN — AMINOPHYLLINE 75 MG: 25 INJECTION, SOLUTION INTRAVENOUS at 12:05

## 2025-05-30 ENCOUNTER — HOSPITAL ENCOUNTER (OUTPATIENT)
Dept: INTERVENTIONAL RADIOLOGY/VASCULAR | Facility: HOSPITAL | Age: 60
Discharge: HOME | End: 2025-05-30
Attending: INTERNAL MEDICINE
Payer: MEDICAID

## 2025-05-30 VITALS
SYSTOLIC BLOOD PRESSURE: 125 MMHG | OXYGEN SATURATION: 96 % | DIASTOLIC BLOOD PRESSURE: 66 MMHG | RESPIRATION RATE: 18 BRPM | HEART RATE: 55 BPM

## 2025-05-30 DIAGNOSIS — K74.60 CIRRHOSIS OF LIVER WITH ASCITES, UNSPECIFIED HEPATIC CIRRHOSIS TYPE: Chronic | ICD-10-CM

## 2025-05-30 DIAGNOSIS — R18.8 CIRRHOSIS OF LIVER WITH ASCITES, UNSPECIFIED HEPATIC CIRRHOSIS TYPE: Chronic | ICD-10-CM

## 2025-05-30 LAB
CFR FLOW - ANTERIOR: 1.93
CFR FLOW - INFERIOR: 1.94
CFR FLOW - LATERAL: 1.8
CFR FLOW - MAX: 2.73
CFR FLOW - MIN: 1.49
CFR FLOW - SEPTAL: 2.08
CFR FLOW - WHOLE HEART: 1.94
CV STRESS BASE HR: 56 BPM
DIASTOLIC BLOOD PRESSURE: 74 MMHG
EJECTION FRACTION- HIGH: 65 %
END DIASTOLIC INDEX-HIGH: 153 ML/M2
END DIASTOLIC INDEX-LOW: 93 ML/M2
END SYSTOLIC INDEX-HIGH: 71 ML/M2
END SYSTOLIC INDEX-LOW: 31 ML/M2
OHS CV CPX 1 MINUTE RECOVERY HEART RATE: 57 BPM
OHS CV CPX 85 PERCENT MAX PREDICTED HEART RATE MALE: 135
OHS CV CPX MAX PREDICTED HEART RATE: 159
OHS CV CPX PATIENT IS FEMALE: 1
OHS CV CPX PATIENT IS MALE: 0
OHS CV CPX PEAK DIASTOLIC BLOOD PRESSURE: 57 MMHG
OHS CV CPX PEAK HEAR RATE: 63 BPM
OHS CV CPX PEAK RATE PRESSURE PRODUCT: 6111
OHS CV CPX PEAK SYSTOLIC BLOOD PRESSURE: 97 MMHG
OHS CV CPX PERCENT MAX PREDICTED HEART RATE ACHIEVED: 41
OHS CV CPX RATE PRESSURE PRODUCT PRESENTING: 7504
OHS CV INITIAL DOSE: 31.9 MCG/KG/MIN
OHS CV MODERATELY REDUCED FLOW CAPACITY: 5 %
OHS CV MYOCARDIAL STEAL: 0 %
OHS CV NO ISCHEMIA MILDLY REDUCED FLOW CAPACTY: 91 %
OHS CV NO ISCHEMIA MINIMALLY REDUCED FLOW CAPACITY: 3 %
OHS CV NORMAL FLOW CAPACITY COMPARABLE TO HEALTHY YOUNG VOLUNTEERS: 0 %
OHS CV PEAK DOSE: 31.8 MCG/KG/MIN
OHS CV PET ID: 8962
OHS CV SEVERELY REDUCED FLOW CAPACITY LARGEST SINGLE CONTINUOUS REGION: 0 %
OHS CV SEVERELY REDUCED FLOW CAPACITY: 0 %
OHS CV TOTAL EXAM DLP: 605.97 MGY-CM
REST FLOW - ANTERIOR: 0.44 CC/MIN/G
REST FLOW - INFERIOR: 0.49 CC/MIN/G
REST FLOW - LATERAL: 0.5 CC/MIN/G
REST FLOW - MAX: 0.67 CC/MIN/G
REST FLOW - MIN: 0.23 CC/MIN/G
REST FLOW - SEPTAL: 0.39 CC/MIN/G
REST FLOW - WHOLE HEART: 0.46 CC/MIN/G
RETIRED EF AND QEF - SEE NOTES: 53 %
STRESS FLOW - ANTERIOR: 0.84 CC/MIN/G
STRESS FLOW - INFERIOR: 0.93 CC/MIN/G
STRESS FLOW - LATERAL: 0.89 CC/MIN/G
STRESS FLOW - MAX: 1.12 CC/MIN/G
STRESS FLOW - MIN: 0.42 CC/MIN/G
STRESS FLOW - SEPTAL: 0.8 CC/MIN/G
STRESS FLOW - WHOLE HEART: 0.87 CC/MIN/G
SYSTOLIC BLOOD PRESSURE: 134 MMHG

## 2025-05-30 PROCEDURE — 49083 ABD PARACENTESIS W/IMAGING: CPT | Mod: ,,, | Performed by: FAMILY MEDICINE

## 2025-05-30 PROCEDURE — 49083 ABD PARACENTESIS W/IMAGING: CPT | Performed by: RADIOLOGY

## 2025-05-30 PROCEDURE — C1729 CATH, DRAINAGE: HCPCS

## 2025-05-30 NOTE — H&P
Radiology History & Physical      SUBJECTIVE:     Chief Complaint: abdominal distention    History of Present Illness:  Vicky Alvarado is a 60 y.o. female who presents for ultrasound guided paracentesis  Past Medical History:   Diagnosis Date    Anticoagulant long-term use     Arthritis     Atrial fibrillation     cardioversion    Atrial fibrillation with RVR     HAS WATCHMAN IN PLACE NOW    Avascular necrosis     L hand    Cellulitis of extremity 05/07/2022    CHF (congestive heart failure)     Chronic fatigue     Cirrhosis of liver with ascites     Depression     Diabetes mellitus     Encounter for blood transfusion 07/22/2020    Encounter for blood transfusion 03/2022    Fatty liver     GERD (gastroesophageal reflux disease)     Hx of psychiatric care     Hypertension     Iron deficiency anemia 05/07/2022    TIBC 444 with saturated iron 8    Left leg cellulitis 05/07/2022    Liver disease     Obese     Pre-diabetes 05/07/2022    Psychiatric problem     Sleep apnea     wears cpap    SOB (shortness of breath) on exertion     Weight loss     75lb intentional weight loss     Past Surgical History:   Procedure Laterality Date    ABLATION OF ARRHYTHMOGENIC FOCUS FOR ATRIAL FIBRILLATION N/A 07/20/2020    Procedure: Ablation atrial fibrillation;  Surgeon: Gabriel Hawley MD;  Location: [facility] EP LAB;  Service: Cardiology;  Laterality: N/A;  afib, PVI, RFA, REENA, SHADY, anes, MB, 3 Prep    ABLATION OF ARRHYTHMOGENIC FOCUS FOR ATRIAL FIBRILLATION N/A 01/24/2022    Procedure: Ablation atrial fibrillation;  Surgeon: Gabriel Hawley MD;  Location: [facility] EP LAB;  Service: Cardiology;  Laterality: N/A;  afib/afl, PVI (re-do)/CTI, RFA, REENA (cx if SR), SHADY, anes, MB, 3 Prep    APPLICATION OF WOUND VACUUM-ASSISTED CLOSURE DEVICE Left 08/03/2020    Procedure: APPLICATION, WOUND VAC;  Surgeon: SEMAJ Márquez III, MD;  Location: [facility] OR [address] [place] [address];  Service: Peripheral Vascular;  Laterality: Left;    APPLICATION OF WOUND  VACUUM-ASSISTED CLOSURE DEVICE Left 08/06/2020    Procedure: APPLICATION, WOUND VAC;  Surgeon: SEMAJ Márquez III, MD;  Location: [facility] OR 2ND FLR;  Service: Peripheral Vascular;  Laterality: Left;    CARPAL TUNNEL RELEASE Right 06/10/2020    Procedure: RELEASE, CARPAL TUNNEL - RIGHT;  Surgeon: Adelaida Hall MD;  Location: [facility] OR;  Service: Orthopedics;  Laterality: Right;  GENERAL AND REGIONAL    CARPAL TUNNEL RELEASE Left 05/05/2021    Procedure: RELEASE, CARPAL TUNNEL, LEFT;  Surgeon: Adelaida Hall MD;  Location: [facility] OR;  Service: Orthopedics;  Laterality: Left;  GENERAL & REGIONAL IN PACU    CARPECTOMY Left 09/06/2023    Procedure: CARPECTOMY;  Surgeon: Adelaida Hall MD;  Location: [facility] OR;  Service: Orthopedics;  Laterality: Left;  MAC/REGIONAL  LEFT PROXIMAL ROW    CLOSURE OF WOUND Left 08/06/2020    Procedure: CLOSURE, WOUND;  Surgeon: SEMAJ Márquez III, MD;  Location: [facility] OR 2ND FLR;  Service: Peripheral Vascular;  Laterality: Left;  Complex    COLONOSCOPY N/A 08/17/2021    Procedure: COLONOSCOPY Suprep;  Surgeon: Loco Deluca MD;  Location: [facility];  Service: Endoscopy;  Laterality: N/A;    ECHOCARDIOGRAM,TRANSESOPHAGEAL N/A 07/24/2023    Procedure: Transesophageal echo (REENA) intra-procedure log documentation;  Surgeon: Leyla Diagnostic Provider;  Location: [facility] EP LAB;  Service: Cardiology;  Laterality: N/A;  s/p WM, REENA, anes, 3 Prep    ESOPHAGOGASTRODUODENOSCOPY N/A 08/17/2021    Procedure: EGD (ESOPHAGOGASTRODUODENOSCOPY);  Surgeon: Loco Deluca MD;  Location: [facility] ENDO;  Service: Endoscopy;  Laterality: N/A;  Patient is schedule to have her Covid test on 08/14/2021 at the [facility] @ 9:30am. [place].    EXPLORATION OF FEMORAL ARTERY Left 07/21/2020    Procedure: EXPLORATION, ARTERY, FEMORAL;  Surgeon: SEMAJ Márquez III, MD;  Location: [facility] OR 2ND FLR;  Service: Peripheral Vascular;  Laterality: Left;  1. Urgent Direct repair, L SFA branch  laceration    FOOT SURGERY      HYSTEROSCOPY WITH DILATION AND CURETTAGE OF UTERUS N/A 02/19/2022    Procedure: HYSTEROSCOPY, WITH DILATION AND CURETTAGE OF UTERUS;  Surgeon: Shane Palomo MD;  Location: [address];  Service: OB/GYN;  Laterality: N/A;    INCISION AND DRAINAGE OF KNEE Left 05/12/2022    Procedure: INCISION AND DRAINAGE, Prepatellar bursectomy.;  Surgeon: Dre Guzmán MD;  [address];  Service: Orthopedics;  Laterality: Left;    LEFT HEART CATHETERIZATION Left 02/07/2023    Procedure: Left heart cath;  Surgeon: Josiah Terry MD;  Location: [facility] CATH LAB;  Service: Cardiology;  Laterality: Left;  borderline moderate bleeding risk 6.3%    OCCLUSION OF LEFT ATRIAL APPENDAGE N/A 06/12/2023    Procedure: Left atrial appendage occlusion;  Surgeon: Gabriel Hawley MD;  Location: [facility] EP LAB;  Service: Cardiology;  Laterality: N/A;  afib, watchman, BSCI, demi, ALIYA ibarra, 3prep    RELEASE OF ULNAR NERVE AT CUBITAL TUNNEL Bilateral     RIGHT HEART CATHETERIZATION Right 08/05/2024    Procedure: INSERTION, CATHETER, RIGHT HEART;  Surgeon: Zane Liu MD;  Location: [facility] CATH LAB;  Service: Cardiology;  Laterality: Right;    ROBOT-ASSISTED LAPAROSCOPIC ABDOMINAL HYSTERECTOMY USING DA KEIRY XI N/A 08/09/2022    Procedure: XI ROBOTIC HYSTERECTOMY;  Surgeon: Yolanda Barriga MD;  Location: [facility];  Service: OB/GYN;  Laterality: N/A;  dual console requested    ROBOT-ASSISTED LAPAROSCOPIC SALPINGO-OOPHORECTOMY Bilateral 08/09/2022    Procedure: ROBOTIC SALPINGO-OOPHORECTOMY;  Surgeon: Yolanda Barriga MD;  Location: [facility];  Service: OB/GYN;  Laterality: Bilateral;    TRANSESOPHAGEAL ECHOCARDIOGRAPHY N/A 06/12/2023    Procedure: ECHOCARDIOGRAM, TRANSESOPHAGEAL;  Surgeon: Cyril Mast MD;  Location: [facility] EP LAB;  Service: Cardiology;  Laterality: N/A;    TREATMENT OF CARDIAC ARRHYTHMIA N/A 01/29/2020    Procedure: CARDIOVERSION;  Surgeon: Gabriel Hawley MD;  Location:  [facility] EP LAB;  Service: Cardiology;  Laterality: N/A;  af, demi, dccv, anes, mb, 345    TREATMENT OF CARDIAC ARRHYTHMIA  01/24/2022    Procedure: Cardioversion or Defibrillation;  Surgeon: Gabriel Hawley MD;  Location: [facility] EP LAB;  Service: Cardiology;;    WOUND DEBRIDEMENT Left 08/06/2020    Procedure: DEBRIDEMENT, WOUND;  Surgeon: SEMAJ Márquez III, MD;  Location: [facility] OR [address] [place] [address];  Service: Peripheral Vascular;  Laterality: Left;       Home Meds:   Prior to Admission medications    Medication Sig Start Date End Date Taking? Authorizing Provider   albuterol (VENTOLIN HFA) 90 mcg/actuation inhaler Inhale 2 puffs into the lungs every 6 (six) hours as needed for Wheezing. Rescue 1/8/25 1/8/26  Gordy Cordero MD   ALPRAZolam (XANAX) 0.5 MG tablet Take 1 tablet (0.5 mg total) by mouth 2 (two) times daily as needed for Anxiety. 2/4/25   Gordy Cordero MD   atorvastatin (LIPITOR) 80 MG tablet Take 1 tablet (80 mg total) by mouth once daily. 5/21/25 5/21/26  Niesha Brumfield PA-C   b complex vitamins capsule Take 1 capsule by mouth every other day.    Provider, Historical   DULoxetine (CYMBALTA) 60 MG capsule Take 1 capsule (60 mg total) by mouth once daily. 8/12/24   Brennon Nunez MD   ferrous sulfate 325 (65 FE) MG EC tablet Take 1 tablet (325 mg total) by mouth every other day. 9/18/24   Eugene Woodward MD   furosemide (LASIX) 20 MG tablet Take 1 tablet (20 mg total) by mouth once daily. 2/5/25 2/5/26  Gordy Cordero MD   ibuprofen (ADVIL,MOTRIN) 600 MG tablet Take 1 tablet (600 mg total) by mouth 2 (two) times daily as needed for Pain. 2/4/25   Gordy Cordero MD   lactulose (CONSTULOSE) 10 gram/15 mL solution Take 30 mLs (20 g total) by mouth 3 (three) times daily. Please ensure you are having at least 2-3 bowel movements every day. 4/3/25   Kiko Saba MD   metoprolol succinate (TOPROL-XL) 100 MG 24 hr tablet Take 1 tablet (100 mg total) by mouth 2 (two) times daily. 2/5/25 2/5/26  Dale  Marah HAIR NP   multivitamin (THERAGRAN) per tablet Take 1 tablet by mouth once daily.    Provider, Historical   nystatin (MYCOSTATIN) powder Apply topically 2 (two) times daily. 5/7/25   Gordy Cordero MD   omeprazole (PRILOSEC) 20 MG capsule TAKE 1 CAPSULE BY MOUTH ONCE DAILY 7/25/24   Gordy Cordero MD   ondansetron (ZOFRAN-ODT) 4 MG TbDL Take 2 tablets (8 mg total) by mouth every 8 (eight) hours as needed (nasuea, vomiting). 5/7/25   Gordy Cordero MD   oxyCODONE (ROXICODONE) 5 MG immediate release tablet Take 1 tablet (5 mg total) by mouth every 12 (twelve) hours as needed for Pain. 5/7/25   Gordy Cordero MD   spironolactone (ALDACTONE) 50 MG tablet Take 1 tablet (50 mg total) by mouth once daily. 11/12/24 6/11/25  Kiko Saba MD   traZODone (DESYREL) 100 MG tablet Take 100 mg by mouth every evening.    Provider, Historical   traZODone (DESYREL) 100 MG tablet Take 1 tablet (100 mg total) by mouth nightly as needed for Insomnia. 3/9/25   Gordy Cordero MD   medroxyPROGESTERone (PROVERA) 10 MG tablet Take 2 tablets (20 mg total) by mouth 3 (three) times daily. 7/13/22 8/9/22  Yolanda Barriga MD     Anticoagulants/Antiplatelets: no anticoagulation    Allergies:   Review of patient's allergies indicates:   Allergen Reactions    Flecainide Shortness Of Breath and Swelling    Shellfish containing products Other (See Comments)     Makes gout terrible    Vancomycin analogues Hives, Itching and Rash     Sedation History:  no adverse reactions    Review of Systems:   Hematological: no known coagulopathies  Respiratory: no shortness of breath  Cardiovascular: no chest pain  Gastrointestinal: no abdominal pain  Genito-Urinary: no dysuria  Musculoskeletal: negative  Neurological: no TIA or stroke symptoms         OBJECTIVE:     Vital Signs (Most Recent)  Pulse: 61 (05/30/25 0822)  Resp: 18 (05/30/25 0822)  BP: (!) 143/67 (05/30/25 0822)  SpO2: 96 % (05/30/25 0822)    Physical Exam:  ASA:  2  Mallampati: n/a    General: no acute distress  Mental Status: alert and oriented to person, place and time  HEENT: normocephalic, atraumatic  Chest: unlabored breathing  Heart: regular heart rate  Abdomen: distended  Extremity: moves all extremities    ASSESSMENT/PLAN:     Sedation Plan: local  Patient will undergo ultrasound guided paracentesis.    SCOUT Wolfe, FNP  Interventional Radiology  [phone] clinic

## 2025-05-30 NOTE — H&P
Radiology History & Physical      SUBJECTIVE:     Chief Complaint: abdominal distention    History of Present Illness:  Vicky Alvarado is a 60 y.o. female who presents for ultrasound guided paracentesis  Past Medical History:   Diagnosis Date    Anticoagulant long-term use     Arthritis     Atrial fibrillation     cardioversion    Atrial fibrillation with RVR     HAS WATCHMAN IN PLACE NOW    Avascular necrosis     L hand    Cellulitis of extremity 05/07/2022    CHF (congestive heart failure)     Chronic fatigue     Cirrhosis of liver with ascites     Depression     Diabetes mellitus     Encounter for blood transfusion 07/22/2020    Encounter for blood transfusion 03/2022    Fatty liver     GERD (gastroesophageal reflux disease)     Hx of psychiatric care     Hypertension     Iron deficiency anemia 05/07/2022    TIBC 444 with saturated iron 8    Left leg cellulitis 05/07/2022    Liver disease     Obese     Pre-diabetes 05/07/2022    Psychiatric problem     Sleep apnea     wears cpap    SOB (shortness of breath) on exertion     Weight loss     75lb intentional weight loss     Past Surgical History:   Procedure Laterality Date    ABLATION OF ARRHYTHMOGENIC FOCUS FOR ATRIAL FIBRILLATION N/A 07/20/2020    Procedure: Ablation atrial fibrillation;  Surgeon: Gabriel Hawley MD;  Location: [facility] EP LAB;  Service: Cardiology;  Laterality: N/A;  afib, PVI, RFA, REENA, SHADY, anes, MB, 3 Prep    ABLATION OF ARRHYTHMOGENIC FOCUS FOR ATRIAL FIBRILLATION N/A 01/24/2022    Procedure: Ablation atrial fibrillation;  Surgeon: Gabriel Hawley MD;  Location: [facility] EP LAB;  Service: Cardiology;  Laterality: N/A;  afib/afl, PVI (re-do)/CTI, RFA, REENA (cx if SR), SHADY, anes, MB, 3 Prep    APPLICATION OF WOUND VACUUM-ASSISTED CLOSURE DEVICE Left 08/03/2020    Procedure: APPLICATION, WOUND VAC;  Surgeon: SEMAJ Márquez III, MD;  Location: [facility] OR [address] [place] [address];  Service: Peripheral Vascular;  Laterality: Left;    APPLICATION OF WOUND  VACUUM-ASSISTED CLOSURE DEVICE Left 08/06/2020    Procedure: APPLICATION, WOUND VAC;  Surgeon: SEMAJ Márquez III, MD;  Location: [facility] OR 2ND FLR;  Service: Peripheral Vascular;  Laterality: Left;    CARPAL TUNNEL RELEASE Right 06/10/2020    Procedure: RELEASE, CARPAL TUNNEL - RIGHT;  Surgeon: Adelaida Hall MD;  Location: [facility] OR;  Service: Orthopedics;  Laterality: Right;  GENERAL AND REGIONAL    CARPAL TUNNEL RELEASE Left 05/05/2021    Procedure: RELEASE, CARPAL TUNNEL, LEFT;  Surgeon: Adelaida Hall MD;  Location: [facility] OR;  Service: Orthopedics;  Laterality: Left;  GENERAL & REGIONAL IN PACU    CARPECTOMY Left 09/06/2023    Procedure: CARPECTOMY;  Surgeon: Adelaida Hall MD;  Location: [facility] OR;  Service: Orthopedics;  Laterality: Left;  MAC/REGIONAL  LEFT PROXIMAL ROW    CLOSURE OF WOUND Left 08/06/2020    Procedure: CLOSURE, WOUND;  Surgeon: SEMAJ Márquez III, MD;  Location: [facility] OR 2ND FLR;  Service: Peripheral Vascular;  Laterality: Left;  Complex    COLONOSCOPY N/A 08/17/2021    Procedure: COLONOSCOPY Suprep;  Surgeon: Loco Deluca MD;  Location: [facility];  Service: Endoscopy;  Laterality: N/A;    ECHOCARDIOGRAM,TRANSESOPHAGEAL N/A 07/24/2023    Procedure: Transesophageal echo (REENA) intra-procedure log documentation;  Surgeon: Leyla Diagnostic Provider;  Location: [facility] EP LAB;  Service: Cardiology;  Laterality: N/A;  s/p WM, REENA, anes, 3 Prep    ESOPHAGOGASTRODUODENOSCOPY N/A 08/17/2021    Procedure: EGD (ESOPHAGOGASTRODUODENOSCOPY);  Surgeon: Loco Deluca MD;  Location: [facility] ENDO;  Service: Endoscopy;  Laterality: N/A;  Patient is schedule to have her Covid test on 08/14/2021 at the [facility] @ 9:30am. [place].    EXPLORATION OF FEMORAL ARTERY Left 07/21/2020    Procedure: EXPLORATION, ARTERY, FEMORAL;  Surgeon: SEMAJ Márquez III, MD;  Location: [facility] OR 2ND FLR;  Service: Peripheral Vascular;  Laterality: Left;  1. Urgent Direct repair, L SFA branch  laceration    FOOT SURGERY      HYSTEROSCOPY WITH DILATION AND CURETTAGE OF UTERUS N/A 02/19/2022    Procedure: HYSTEROSCOPY, WITH DILATION AND CURETTAGE OF UTERUS;  Surgeon: Shane Palomo MD;  Location: [address];  Service: OB/GYN;  Laterality: N/A;    INCISION AND DRAINAGE OF KNEE Left 05/12/2022    Procedure: INCISION AND DRAINAGE, Prepatellar bursectomy.;  Surgeon: Dre Guzmán MD;  [address];  Service: Orthopedics;  Laterality: Left;    LEFT HEART CATHETERIZATION Left 02/07/2023    Procedure: Left heart cath;  Surgeon: Josiah Terry MD;  Location: [facility] CATH LAB;  Service: Cardiology;  Laterality: Left;  borderline moderate bleeding risk 6.3%    OCCLUSION OF LEFT ATRIAL APPENDAGE N/A 06/12/2023    Procedure: Left atrial appendage occlusion;  Surgeon: Gabriel Hawley MD;  Location: [facility] EP LAB;  Service: Cardiology;  Laterality: N/A;  afib, watchman, BSCI, demi, ALIYA ibarra, 3prep    RELEASE OF ULNAR NERVE AT CUBITAL TUNNEL Bilateral     RIGHT HEART CATHETERIZATION Right 08/05/2024    Procedure: INSERTION, CATHETER, RIGHT HEART;  Surgeon: Zane Liu MD;  Location: [facility] CATH LAB;  Service: Cardiology;  Laterality: Right;    ROBOT-ASSISTED LAPAROSCOPIC ABDOMINAL HYSTERECTOMY USING DA KEIRY XI N/A 08/09/2022    Procedure: XI ROBOTIC HYSTERECTOMY;  Surgeon: Yolanda Barriga MD;  Location: [facility];  Service: OB/GYN;  Laterality: N/A;  dual console requested    ROBOT-ASSISTED LAPAROSCOPIC SALPINGO-OOPHORECTOMY Bilateral 08/09/2022    Procedure: ROBOTIC SALPINGO-OOPHORECTOMY;  Surgeon: Yolanda Barriga MD;  Location: [facility];  Service: OB/GYN;  Laterality: Bilateral;    TRANSESOPHAGEAL ECHOCARDIOGRAPHY N/A 06/12/2023    Procedure: ECHOCARDIOGRAM, TRANSESOPHAGEAL;  Surgeon: Cyril Mast MD;  Location: [facility] EP LAB;  Service: Cardiology;  Laterality: N/A;    TREATMENT OF CARDIAC ARRHYTHMIA N/A 01/29/2020    Procedure: CARDIOVERSION;  Surgeon: Gabriel Hawley MD;  Location:  [facility] EP LAB;  Service: Cardiology;  Laterality: N/A;  af, demi, dccv, anes, mb, 345    TREATMENT OF CARDIAC ARRHYTHMIA  01/24/2022    Procedure: Cardioversion or Defibrillation;  Surgeon: Gabriel Hawley MD;  Location: [facility] EP LAB;  Service: Cardiology;;    WOUND DEBRIDEMENT Left 08/06/2020    Procedure: DEBRIDEMENT, WOUND;  Surgeon: SEMAJ Márquez III, MD;  Location: [facility] OR [address] [place] [address];  Service: Peripheral Vascular;  Laterality: Left;       Home Meds:   Prior to Admission medications    Medication Sig Start Date End Date Taking? Authorizing Provider   albuterol (VENTOLIN HFA) 90 mcg/actuation inhaler Inhale 2 puffs into the lungs every 6 (six) hours as needed for Wheezing. Rescue 1/8/25 1/8/26  Gordy Cordero MD   ALPRAZolam (XANAX) 0.5 MG tablet Take 1 tablet (0.5 mg total) by mouth 2 (two) times daily as needed for Anxiety. 2/4/25   Gordy Cordero MD   atorvastatin (LIPITOR) 80 MG tablet Take 1 tablet (80 mg total) by mouth once daily. 5/21/25 5/21/26  Niesha Brumfield PA-C   b complex vitamins capsule Take 1 capsule by mouth every other day.    Provider, Historical   DULoxetine (CYMBALTA) 60 MG capsule Take 1 capsule (60 mg total) by mouth once daily. 8/12/24   Brennon Nunez MD   ferrous sulfate 325 (65 FE) MG EC tablet Take 1 tablet (325 mg total) by mouth every other day. 9/18/24   Eugene Woodward MD   furosemide (LASIX) 20 MG tablet Take 1 tablet (20 mg total) by mouth once daily. 2/5/25 2/5/26  Gordy Cordero MD   ibuprofen (ADVIL,MOTRIN) 600 MG tablet Take 1 tablet (600 mg total) by mouth 2 (two) times daily as needed for Pain. 2/4/25   Gordy Cordero MD   lactulose (CONSTULOSE) 10 gram/15 mL solution Take 30 mLs (20 g total) by mouth 3 (three) times daily. Please ensure you are having at least 2-3 bowel movements every day. 4/3/25   Kiko Saba MD   metoprolol succinate (TOPROL-XL) 100 MG 24 hr tablet Take 1 tablet (100 mg total) by mouth 2 (two) times daily. 2/5/25 2/5/26  Dale  Marah HAIR NP   multivitamin (THERAGRAN) per tablet Take 1 tablet by mouth once daily.    Provider, Historical   nystatin (MYCOSTATIN) powder Apply topically 2 (two) times daily. 5/7/25   Gordy Cordero MD   omeprazole (PRILOSEC) 20 MG capsule TAKE 1 CAPSULE BY MOUTH ONCE DAILY 7/25/24   Gordy Cordero MD   ondansetron (ZOFRAN-ODT) 4 MG TbDL Take 2 tablets (8 mg total) by mouth every 8 (eight) hours as needed (nasuea, vomiting). 5/7/25   Gordy Cordero MD   oxyCODONE (ROXICODONE) 5 MG immediate release tablet Take 1 tablet (5 mg total) by mouth every 12 (twelve) hours as needed for Pain. 5/7/25   Gordy Cordero MD   spironolactone (ALDACTONE) 50 MG tablet Take 1 tablet (50 mg total) by mouth once daily. 11/12/24 6/11/25  Kiko Saba MD   traZODone (DESYREL) 100 MG tablet Take 100 mg by mouth every evening.    Provider, Historical   traZODone (DESYREL) 100 MG tablet Take 1 tablet (100 mg total) by mouth nightly as needed for Insomnia. 3/9/25   Gordy Cordero MD   medroxyPROGESTERone (PROVERA) 10 MG tablet Take 2 tablets (20 mg total) by mouth 3 (three) times daily. 7/13/22 8/9/22  Yolanda Barriga MD     Anticoagulants/Antiplatelets: no anticoagulation    Allergies:   Review of patient's allergies indicates:   Allergen Reactions    Flecainide Shortness Of Breath and Swelling    Shellfish containing products Other (See Comments)     Makes gout terrible    Vancomycin analogues Hives, Itching and Rash     Sedation History:  no adverse reactions    Review of Systems:   Hematological: no known coagulopathies  Respiratory: no shortness of breath  Cardiovascular: no chest pain  Gastrointestinal: no abdominal pain  Genito-Urinary: no dysuria  Musculoskeletal: negative  Neurological: no TIA or stroke symptoms         OBJECTIVE:     Vital Signs (Most Recent)  Pulse: 61 (05/30/25 0822)  Resp: 18 (05/30/25 0822)  BP: (!) 143/67 (05/30/25 0822)  SpO2: 96 % (05/30/25 0822)    Physical Exam:  ASA:  2  Mallampati: n/a    General: no acute distress  Mental Status: alert and oriented to person, place and time  HEENT: normocephalic, atraumatic  Chest: unlabored breathing  Heart: regular heart rate  Abdomen: distended  Extremity: moves all extremities    ASSESSMENT/PLAN:     Sedation Plan: local  Patient will undergo ultrasound guided paracentesis.    SCOUT Wolfe, FNP  Interventional Radiology  [phone] clinic

## 2025-05-30 NOTE — SEDATION DOCUMENTATION
Paracentesis complete. 3000 mLs peritoneal fluid drained. Pt tolerated well. Dressing to left abd clean, dry, and intact. Pt ambulated back to lobby.

## 2025-05-30 NOTE — DISCHARGE INSTRUCTIONS
Interventional Radiology Clinic   For complications   [phone]. Monday - Friday, 8:00 am - 4:00 pm    [phone] After hours and on holidays. Ask to speak with the interventional radiologist on call.     For Scheduling   [phone] Monday - Friday, 8:00 am - 4:00 pm

## 2025-06-03 DIAGNOSIS — R18.8 CIRRHOSIS OF LIVER WITH ASCITES, UNSPECIFIED HEPATIC CIRRHOSIS TYPE: Chronic | ICD-10-CM

## 2025-06-03 DIAGNOSIS — K76.0 METABOLIC DYSFUNCTION-ASSOCIATED STEATOTIC LIVER DISEASE (MASLD): Chronic | ICD-10-CM

## 2025-06-03 DIAGNOSIS — K74.60 CIRRHOSIS OF LIVER WITH ASCITES, UNSPECIFIED HEPATIC CIRRHOSIS TYPE: Chronic | ICD-10-CM

## 2025-06-03 RX ORDER — SPIRONOLACTONE 50 MG/1
50 TABLET, FILM COATED ORAL DAILY
Qty: 30 TABLET | Refills: 6 | Status: CANCELLED | OUTPATIENT
Start: 2025-06-03 | End: 2025-12-30

## 2025-06-04 DIAGNOSIS — R18.8 CIRRHOSIS OF LIVER WITH ASCITES, UNSPECIFIED HEPATIC CIRRHOSIS TYPE: Chronic | ICD-10-CM

## 2025-06-04 DIAGNOSIS — K74.60 CIRRHOSIS OF LIVER WITH ASCITES, UNSPECIFIED HEPATIC CIRRHOSIS TYPE: Chronic | ICD-10-CM

## 2025-06-04 DIAGNOSIS — K76.0 METABOLIC DYSFUNCTION-ASSOCIATED STEATOTIC LIVER DISEASE (MASLD): Chronic | ICD-10-CM

## 2025-06-04 RX ORDER — SPIRONOLACTONE 50 MG/1
50 TABLET, FILM COATED ORAL DAILY
Qty: 30 TABLET | Refills: 6 | Status: SHIPPED | OUTPATIENT
Start: 2025-06-04 | End: 2025-12-31

## 2025-06-06 ENCOUNTER — HOSPITAL ENCOUNTER (OUTPATIENT)
Dept: INTERVENTIONAL RADIOLOGY/VASCULAR | Facility: HOSPITAL | Age: 60
Discharge: HOME | End: 2025-06-06
Attending: INTERNAL MEDICINE
Payer: MEDICAID

## 2025-06-06 VITALS
DIASTOLIC BLOOD PRESSURE: 56 MMHG | HEART RATE: 61 BPM | SYSTOLIC BLOOD PRESSURE: 108 MMHG | OXYGEN SATURATION: 97 % | RESPIRATION RATE: 16 BRPM

## 2025-06-06 DIAGNOSIS — K74.69 OTHER CIRRHOSIS OF LIVER: Chronic | ICD-10-CM

## 2025-06-06 PROCEDURE — C1729 CATH, DRAINAGE: HCPCS

## 2025-06-06 NOTE — PLAN OF CARE
Pt arrived to MPU bay 1 for paracentesis. Pt oriented to unit and staff. Plan of care reviewed with patient, patient verbalizes understanding. Comfort measures utilized. Pt safely transferred to procedural table. Fall risk reviewed with patient, fall risk interventions maintained. Safety strap applied, positioner pillows utilized to minimize pressure points. Blankets applied. Pt prepped and draped utilizing standard sterile technique. Patient placed on continuous monitoring, as required by sedation policy. Timeouts completed utilizing standard universal time-out, per department and facility policy. RN to remain at bedside, continuous monitoring maintained. Pt resting comfortably. Denies pain/discomfort. Will continue to monitor. See flow sheets for monitoring, medication administration, and updates.

## 2025-06-06 NOTE — H&P
Radiology History & Physical      SUBJECTIVE:     Chief Complaint: abdominal distention    History of Present Illness:  Vicky Alvarado is a 60 y.o. female who presents for ultrasound guided paracentesis  Past Medical History:   Diagnosis Date    Anticoagulant long-term use     Arthritis     Atrial fibrillation     cardioversion    Atrial fibrillation with RVR     HAS WATCHMAN IN PLACE NOW    Avascular necrosis     L hand    Cellulitis of extremity 05/07/2022    CHF (congestive heart failure)     Chronic fatigue     Cirrhosis of liver with ascites     Depression     Diabetes mellitus     Encounter for blood transfusion 07/22/2020    Encounter for blood transfusion 03/2022    Fatty liver     GERD (gastroesophageal reflux disease)     Hx of psychiatric care     Hypertension     Iron deficiency anemia 05/07/2022    TIBC 444 with saturated iron 8    Left leg cellulitis 05/07/2022    Liver disease     Obese     Pre-diabetes 05/07/2022    Psychiatric problem     Sleep apnea     wears cpap    SOB (shortness of breath) on exertion     Weight loss     75lb intentional weight loss     Past Surgical History:   Procedure Laterality Date    ABLATION OF ARRHYTHMOGENIC FOCUS FOR ATRIAL FIBRILLATION N/A 07/20/2020    Procedure: Ablation atrial fibrillation;  Surgeon: Gabriel Hawley MD;  Location: [facility] EP LAB;  Service: Cardiology;  Laterality: N/A;  afib, PVI, RFA, REENA, SHADY, anes, MB, 3 Prep    ABLATION OF ARRHYTHMOGENIC FOCUS FOR ATRIAL FIBRILLATION N/A 01/24/2022    Procedure: Ablation atrial fibrillation;  Surgeon: Gabriel Hawley MD;  Location: [facility] EP LAB;  Service: Cardiology;  Laterality: N/A;  afib/afl, PVI (re-do)/CTI, RFA, REENA (cx if SR), SHADY, anes, MB, 3 Prep    APPLICATION OF WOUND VACUUM-ASSISTED CLOSURE DEVICE Left 08/03/2020    Procedure: APPLICATION, WOUND VAC;  Surgeon: SEMAJ Márquez III, MD;  Location: [facility] OR [address] [place] [address];  Service: Peripheral Vascular;  Laterality: Left;    APPLICATION OF WOUND  VACUUM-ASSISTED CLOSURE DEVICE Left 08/06/2020    Procedure: APPLICATION, WOUND VAC;  Surgeon: SEMAJ Márquez III, MD;  Location: [facility] OR 2ND FLR;  Service: Peripheral Vascular;  Laterality: Left;    CARPAL TUNNEL RELEASE Right 06/10/2020    Procedure: RELEASE, CARPAL TUNNEL - RIGHT;  Surgeon: Adelaida Hall MD;  Location: [facility] OR;  Service: Orthopedics;  Laterality: Right;  GENERAL AND REGIONAL    CARPAL TUNNEL RELEASE Left 05/05/2021    Procedure: RELEASE, CARPAL TUNNEL, LEFT;  Surgeon: Adelaida Hall MD;  Location: [facility] OR;  Service: Orthopedics;  Laterality: Left;  GENERAL & REGIONAL IN PACU    CARPECTOMY Left 09/06/2023    Procedure: CARPECTOMY;  Surgeon: Adelaida Hall MD;  Location: [facility] OR;  Service: Orthopedics;  Laterality: Left;  MAC/REGIONAL  LEFT PROXIMAL ROW    CLOSURE OF WOUND Left 08/06/2020    Procedure: CLOSURE, WOUND;  Surgeon: SEMAJ Márquez III, MD;  Location: [facility] OR 2ND FLR;  Service: Peripheral Vascular;  Laterality: Left;  Complex    COLONOSCOPY N/A 08/17/2021    Procedure: COLONOSCOPY Suprep;  Surgeon: Loco Deluca MD;  Location: [facility];  Service: Endoscopy;  Laterality: N/A;    ECHOCARDIOGRAM,TRANSESOPHAGEAL N/A 07/24/2023    Procedure: Transesophageal echo (REENA) intra-procedure log documentation;  Surgeon: Leyla Diagnostic Provider;  Location: [facility] EP LAB;  Service: Cardiology;  Laterality: N/A;  s/p WM, REENA, anes, 3 Prep    ESOPHAGOGASTRODUODENOSCOPY N/A 08/17/2021    Procedure: EGD (ESOPHAGOGASTRODUODENOSCOPY);  Surgeon: Loco Deluca MD;  Location: [facility] ENDO;  Service: Endoscopy;  Laterality: N/A;  Patient is schedule to have her Covid test on 08/14/2021 at the [facility] @ 9:30am. [place].    EXPLORATION OF FEMORAL ARTERY Left 07/21/2020    Procedure: EXPLORATION, ARTERY, FEMORAL;  Surgeon: SEMAJ Márquez III, MD;  Location: [facility] OR 2ND FLR;  Service: Peripheral Vascular;  Laterality: Left;  1. Urgent Direct repair, L SFA branch  laceration    FOOT SURGERY      HYSTEROSCOPY WITH DILATION AND CURETTAGE OF UTERUS N/A 02/19/2022    Procedure: HYSTEROSCOPY, WITH DILATION AND CURETTAGE OF UTERUS;  Surgeon: Shane Palomo MD;  Location: [address];  Service: OB/GYN;  Laterality: N/A;    INCISION AND DRAINAGE OF KNEE Left 05/12/2022    Procedure: INCISION AND DRAINAGE, Prepatellar bursectomy.;  Surgeon: Dre Guzmán MD;  [address];  Service: Orthopedics;  Laterality: Left;    LEFT HEART CATHETERIZATION Left 02/07/2023    Procedure: Left heart cath;  Surgeon: Josiah Terry MD;  Location: [facility] CATH LAB;  Service: Cardiology;  Laterality: Left;  borderline moderate bleeding risk 6.3%    OCCLUSION OF LEFT ATRIAL APPENDAGE N/A 06/12/2023    Procedure: Left atrial appendage occlusion;  Surgeon: Gabriel aHwley MD;  Location: [facility] EP LAB;  Service: Cardiology;  Laterality: N/A;  afib, watchman, BSCI, demi, ALIYA ibarra, 3prep    RELEASE OF ULNAR NERVE AT CUBITAL TUNNEL Bilateral     RIGHT HEART CATHETERIZATION Right 08/05/2024    Procedure: INSERTION, CATHETER, RIGHT HEART;  Surgeon: Zane Liu MD;  Location: [facility] CATH LAB;  Service: Cardiology;  Laterality: Right;    ROBOT-ASSISTED LAPAROSCOPIC ABDOMINAL HYSTERECTOMY USING DA KEIRY XI N/A 08/09/2022    Procedure: XI ROBOTIC HYSTERECTOMY;  Surgeon: Yolanda Barriga MD;  Location: [facility];  Service: OB/GYN;  Laterality: N/A;  dual console requested    ROBOT-ASSISTED LAPAROSCOPIC SALPINGO-OOPHORECTOMY Bilateral 08/09/2022    Procedure: ROBOTIC SALPINGO-OOPHORECTOMY;  Surgeon: Yolanda Barriga MD;  Location: [facility];  Service: OB/GYN;  Laterality: Bilateral;    TRANSESOPHAGEAL ECHOCARDIOGRAPHY N/A 06/12/2023    Procedure: ECHOCARDIOGRAM, TRANSESOPHAGEAL;  Surgeon: Cyril Mast MD;  Location: [facility] EP LAB;  Service: Cardiology;  Laterality: N/A;    TREATMENT OF CARDIAC ARRHYTHMIA N/A 01/29/2020    Procedure: CARDIOVERSION;  Surgeon: Gabriel Hawley MD;  Location:  [facility] EP LAB;  Service: Cardiology;  Laterality: N/A;  af, demi, dccv, anes, mb, 345    TREATMENT OF CARDIAC ARRHYTHMIA  01/24/2022    Procedure: Cardioversion or Defibrillation;  Surgeon: Gabriel Hawley MD;  Location: [facility] EP LAB;  Service: Cardiology;;    WOUND DEBRIDEMENT Left 08/06/2020    Procedure: DEBRIDEMENT, WOUND;  Surgeon: SEMAJ Márquez III, MD;  Location: [facility] OR [address] [place] [address];  Service: Peripheral Vascular;  Laterality: Left;       Home Meds:   Prior to Admission medications    Medication Sig Start Date End Date Taking? Authorizing Provider   albuterol (VENTOLIN HFA) 90 mcg/actuation inhaler Inhale 2 puffs into the lungs every 6 (six) hours as needed for Wheezing. Rescue 1/8/25 1/8/26  Gordy Cordero MD   ALPRAZolam (XANAX) 0.5 MG tablet Take 1 tablet (0.5 mg total) by mouth 2 (two) times daily as needed for Anxiety. 2/4/25   Gordy Cordero MD   atorvastatin (LIPITOR) 80 MG tablet Take 1 tablet (80 mg total) by mouth once daily. 5/21/25 5/21/26  Niesha Brumfield PA-C   b complex vitamins capsule Take 1 capsule by mouth every other day.    Provider, Historical   DULoxetine (CYMBALTA) 60 MG capsule Take 1 capsule (60 mg total) by mouth once daily. 8/12/24   Brennon Nunez MD   ferrous sulfate 325 (65 FE) MG EC tablet Take 1 tablet (325 mg total) by mouth every other day. 9/18/24   Eugene Woodward MD   furosemide (LASIX) 20 MG tablet Take 1 tablet (20 mg total) by mouth once daily. 2/5/25 2/5/26  Gordy Cordero MD   ibuprofen (ADVIL,MOTRIN) 600 MG tablet Take 1 tablet (600 mg total) by mouth 2 (two) times daily as needed for Pain. 2/4/25   Gordy Cordero MD   lactulose (CONSTULOSE) 10 gram/15 mL solution Take 30 mLs (20 g total) by mouth 3 (three) times daily. Please ensure you are having at least 2-3 bowel movements every day. 4/3/25   Kiko Saba MD   metoprolol succinate (TOPROL-XL) 100 MG 24 hr tablet Take 1 tablet (100 mg total) by mouth 2 (two) times daily. 2/5/25 2/5/26  Dale  Marah HAIR NP   multivitamin (THERAGRAN) per tablet Take 1 tablet by mouth once daily.    Provider, Historical   nystatin (MYCOSTATIN) powder Apply topically 2 (two) times daily. 5/7/25   Gordy Cordero MD   omeprazole (PRILOSEC) 20 MG capsule TAKE 1 CAPSULE BY MOUTH ONCE DAILY 7/25/24   Gordy Cordero MD   ondansetron (ZOFRAN-ODT) 4 MG TbDL Take 2 tablets (8 mg total) by mouth every 8 (eight) hours as needed (nasuea, vomiting). 5/7/25   Gordy Cordero MD   oxyCODONE (ROXICODONE) 5 MG immediate release tablet Take 1 tablet (5 mg total) by mouth every 12 (twelve) hours as needed for Pain. 6/4/25   Gordy Cordero MD   spironolactone (ALDACTONE) 50 MG tablet Take 1 tablet (50 mg total) by mouth once daily. 6/4/25 12/31/25  Kiko Saba MD   traZODone (DESYREL) 100 MG tablet Take 100 mg by mouth every evening.    Provider, Historical   traZODone (DESYREL) 100 MG tablet Take 1 tablet (100 mg total) by mouth nightly as needed for Insomnia. 3/9/25   Gordy Cordero MD   medroxyPROGESTERone (PROVERA) 10 MG tablet Take 2 tablets (20 mg total) by mouth 3 (three) times daily. 7/13/22 8/9/22  Yolanda Barriga MD     Anticoagulants/Antiplatelets: no anticoagulation    Allergies:   Review of patient's allergies indicates:   Allergen Reactions    Flecainide Shortness Of Breath and Swelling    Shellfish containing products Other (See Comments)     Makes gout terrible    Vancomycin analogues Hives, Itching and Rash     Sedation History:  no adverse reactions    Review of Systems:   Hematological: no known coagulopathies  Respiratory: no shortness of breath  Cardiovascular: no chest pain  Gastrointestinal: no abdominal pain  Genito-Urinary: no dysuria  Musculoskeletal: negative  Neurological: no TIA or stroke symptoms         OBJECTIVE:     Vital Signs (Most Recent)  Pulse: (!) 58 (06/06/25 0816)  Resp: 16 (06/06/25 0816)  BP: (!) 148/67 (06/06/25 0816)  SpO2: (!) 94 % (06/06/25 0816)    Physical Exam:  ASA:  2  Mallampati: n/a    General: no acute distress  Mental Status: alert and oriented to person, place and time  HEENT: normocephalic, atraumatic  Chest: unlabored breathing  Heart: regular heart rate  Abdomen: distended  Extremity: moves all extremities    ASSESSMENT/PLAN:     Sedation Plan: local  Patient will undergo ultrasound guided paracentesis.    Bushra Epps PA-C  Interventional Radiology   Spectra: [id number]

## 2025-06-06 NOTE — PROCEDURES
Radiology Post-Procedure Note    Pre Op Diagnosis: Ascites  Post Op Diagnosis: Same    Procedure: Ultrasound Guided Paracentesis    Procedure performed by: Bushra Epps PA-C    Written Informed Consent Obtained: Yes  Specimen Removed: YES, clear yellow fluid  Estimated Blood Loss: Minimal    Findings:   Successful paracentesis. Access obtained in the LUQ. Albumin administered PRN per protocol.    Patient tolerated procedure well.    Bushra Epps PA-C  Interventional Radiology   Spectra: [id number]

## 2025-06-06 NOTE — DISCHARGE INSTRUCTIONS
Please call with any questions or concerns.      Monday thru Friday 8:00 am - 4:30 pm    Interventional Radiology   [phone]    After Hours    Ask for the Radiology Intern on call  [phone]

## 2025-06-06 NOTE — PLAN OF CARE
Paracentesis complete. Pt tolerated well. 3300cc removed from left abdomen. Dressing applied clean, dry, and intact. Patient refused paperwork, and verbalized understanding of at home care. Patient ambulated to [facility].

## 2025-06-13 ENCOUNTER — HOSPITAL ENCOUNTER (OUTPATIENT)
Dept: INTERVENTIONAL RADIOLOGY/VASCULAR | Facility: HOSPITAL | Age: 60
Discharge: HOME | End: 2025-06-13
Attending: INTERNAL MEDICINE
Payer: MEDICAID

## 2025-06-13 VITALS
SYSTOLIC BLOOD PRESSURE: 103 MMHG | HEART RATE: 56 BPM | RESPIRATION RATE: 18 BRPM | OXYGEN SATURATION: 94 % | DIASTOLIC BLOOD PRESSURE: 55 MMHG

## 2025-06-13 DIAGNOSIS — K74.60 CIRRHOSIS OF LIVER WITH ASCITES, UNSPECIFIED HEPATIC CIRRHOSIS TYPE: Chronic | ICD-10-CM

## 2025-06-13 DIAGNOSIS — R18.8 CIRRHOSIS OF LIVER WITH ASCITES, UNSPECIFIED HEPATIC CIRRHOSIS TYPE: Chronic | ICD-10-CM

## 2025-06-13 PROCEDURE — 63600175 PHARM REV CODE 636 W HCPCS: Performed by: FAMILY MEDICINE

## 2025-06-13 PROCEDURE — 49083 ABD PARACENTESIS W/IMAGING: CPT | Mod: ,,, | Performed by: FAMILY MEDICINE

## 2025-06-13 PROCEDURE — 49083 ABD PARACENTESIS W/IMAGING: CPT | Performed by: RADIOLOGY

## 2025-06-13 RX ORDER — LIDOCAINE HYDROCHLORIDE 10 MG/ML
INJECTION, SOLUTION EPIDURAL; INFILTRATION; INTRACAUDAL; PERINEURAL
Status: COMPLETED | OUTPATIENT
Start: 2025-06-13 | End: 2025-06-13

## 2025-06-13 RX ADMIN — LIDOCAINE HYDROCHLORIDE 10 ML: 10 SOLUTION INTRAVENOUS at 08:06

## 2025-06-13 NOTE — DISCHARGE INSTRUCTIONS
Interventional Radiology Clinic   For complications   [phone]. Monday - Friday, 8:00 am - 4:00 pm    [phone] After hours and on holidays. Ask to speak with the interventional radiologist on call.     For Scheduling   [phone] Monday - Friday, 8:00 am - 4:00 pm

## 2025-06-13 NOTE — H&P
Radiology History & Physical      SUBJECTIVE:     Chief Complaint: abdominal distention    History of Present Illness:  Vicky Alvarado is a 60 y.o. female who presents for ultrasound guided paracentesis  Past Medical History:   Diagnosis Date    Anticoagulant long-term use     Arthritis     Atrial fibrillation     cardioversion    Atrial fibrillation with RVR     HAS WATCHMAN IN PLACE NOW    Avascular necrosis     L hand    Cellulitis of extremity 05/07/2022    CHF (congestive heart failure)     Chronic fatigue     Cirrhosis of liver with ascites     Depression     Diabetes mellitus     Encounter for blood transfusion 07/22/2020    Encounter for blood transfusion 03/2022    Fatty liver     GERD (gastroesophageal reflux disease)     Hx of psychiatric care     Hypertension     Iron deficiency anemia 05/07/2022    TIBC 444 with saturated iron 8    Left leg cellulitis 05/07/2022    Liver disease     Obese     Pre-diabetes 05/07/2022    Psychiatric problem     Sleep apnea     wears cpap    SOB (shortness of breath) on exertion     Weight loss     75lb intentional weight loss     Past Surgical History:   Procedure Laterality Date    ABLATION OF ARRHYTHMOGENIC FOCUS FOR ATRIAL FIBRILLATION N/A 07/20/2020    Procedure: Ablation atrial fibrillation;  Surgeon: Gabriel Hawley MD;  Location: [facility] EP LAB;  Service: Cardiology;  Laterality: N/A;  afib, PVI, RFA, REENA, SHADY, anes, MB, 3 Prep    ABLATION OF ARRHYTHMOGENIC FOCUS FOR ATRIAL FIBRILLATION N/A 01/24/2022    Procedure: Ablation atrial fibrillation;  Surgeon: Gabriel Hawley MD;  Location: [facility] EP LAB;  Service: Cardiology;  Laterality: N/A;  afib/afl, PVI (re-do)/CTI, RFA, REENA (cx if SR), SHADY, anes, MB, 3 Prep    APPLICATION OF WOUND VACUUM-ASSISTED CLOSURE DEVICE Left 08/03/2020    Procedure: APPLICATION, WOUND VAC;  Surgeon: SEMAJ Márquez III, MD;  Location: [facility] OR [address] [place] [address];  Service: Peripheral Vascular;  Laterality: Left;    APPLICATION OF WOUND  VACUUM-ASSISTED CLOSURE DEVICE Left 08/06/2020    Procedure: APPLICATION, WOUND VAC;  Surgeon: SEMAJ Márquez III, MD;  Location: [facility] OR 2ND FLR;  Service: Peripheral Vascular;  Laterality: Left;    CARPAL TUNNEL RELEASE Right 06/10/2020    Procedure: RELEASE, CARPAL TUNNEL - RIGHT;  Surgeon: Adelaida Hall MD;  Location: [facility] OR;  Service: Orthopedics;  Laterality: Right;  GENERAL AND REGIONAL    CARPAL TUNNEL RELEASE Left 05/05/2021    Procedure: RELEASE, CARPAL TUNNEL, LEFT;  Surgeon: Adelaida Hall MD;  Location: [facility] OR;  Service: Orthopedics;  Laterality: Left;  GENERAL & REGIONAL IN PACU    CARPECTOMY Left 09/06/2023    Procedure: CARPECTOMY;  Surgeon: Adelaida Hall MD;  Location: [facility] OR;  Service: Orthopedics;  Laterality: Left;  MAC/REGIONAL  LEFT PROXIMAL ROW    CLOSURE OF WOUND Left 08/06/2020    Procedure: CLOSURE, WOUND;  Surgeon: SEMAJ Márquez III, MD;  Location: [facility] OR 2ND FLR;  Service: Peripheral Vascular;  Laterality: Left;  Complex    COLONOSCOPY N/A 08/17/2021    Procedure: COLONOSCOPY Suprep;  Surgeon: Loco Deluca MD;  Location: [facility];  Service: Endoscopy;  Laterality: N/A;    ECHOCARDIOGRAM,TRANSESOPHAGEAL N/A 07/24/2023    Procedure: Transesophageal echo (REENA) intra-procedure log documentation;  Surgeon: Leyla Diagnostic Provider;  Location: [facility] EP LAB;  Service: Cardiology;  Laterality: N/A;  s/p WM, REENA, anes, 3 Prep    ESOPHAGOGASTRODUODENOSCOPY N/A 08/17/2021    Procedure: EGD (ESOPHAGOGASTRODUODENOSCOPY);  Surgeon: Loco Deluca MD;  Location: [facility] ENDO;  Service: Endoscopy;  Laterality: N/A;  Patient is schedule to have her Covid test on 08/14/2021 at the [facility] @ 9:30am. [place].    EXPLORATION OF FEMORAL ARTERY Left 07/21/2020    Procedure: EXPLORATION, ARTERY, FEMORAL;  Surgeon: SEMAJ Márquez III, MD;  Location: [facility] OR 2ND FLR;  Service: Peripheral Vascular;  Laterality: Left;  1. Urgent Direct repair, L SFA branch  laceration    FOOT SURGERY      HYSTEROSCOPY WITH DILATION AND CURETTAGE OF UTERUS N/A 02/19/2022    Procedure: HYSTEROSCOPY, WITH DILATION AND CURETTAGE OF UTERUS;  Surgeon: Shane Palomo MD;  Location: [address];  Service: OB/GYN;  Laterality: N/A;    INCISION AND DRAINAGE OF KNEE Left 05/12/2022    Procedure: INCISION AND DRAINAGE, Prepatellar bursectomy.;  Surgeon: Dre Guzmán MD;  [address];  Service: Orthopedics;  Laterality: Left;    LEFT HEART CATHETERIZATION Left 02/07/2023    Procedure: Left heart cath;  Surgeon: Josiah Terry MD;  Location: [facility] CATH LAB;  Service: Cardiology;  Laterality: Left;  borderline moderate bleeding risk 6.3%    OCCLUSION OF LEFT ATRIAL APPENDAGE N/A 06/12/2023    Procedure: Left atrial appendage occlusion;  Surgeon: Gabriel Hawley MD;  Location: [facility] EP LAB;  Service: Cardiology;  Laterality: N/A;  afib, watchman, BSCI, demi, ALIYA ibarra, 3prep    RELEASE OF ULNAR NERVE AT CUBITAL TUNNEL Bilateral     RIGHT HEART CATHETERIZATION Right 08/05/2024    Procedure: INSERTION, CATHETER, RIGHT HEART;  Surgeon: Zane Liu MD;  Location: [facility] CATH LAB;  Service: Cardiology;  Laterality: Right;    ROBOT-ASSISTED LAPAROSCOPIC ABDOMINAL HYSTERECTOMY USING DA KEIRY XI N/A 08/09/2022    Procedure: XI ROBOTIC HYSTERECTOMY;  Surgeon: Yolanda Barriga MD;  Location: [facility];  Service: OB/GYN;  Laterality: N/A;  dual console requested    ROBOT-ASSISTED LAPAROSCOPIC SALPINGO-OOPHORECTOMY Bilateral 08/09/2022    Procedure: ROBOTIC SALPINGO-OOPHORECTOMY;  Surgeon: Yolanda Barriga MD;  Location: [facility];  Service: OB/GYN;  Laterality: Bilateral;    TRANSESOPHAGEAL ECHOCARDIOGRAPHY N/A 06/12/2023    Procedure: ECHOCARDIOGRAM, TRANSESOPHAGEAL;  Surgeon: Cyril Mast MD;  Location: [facility] EP LAB;  Service: Cardiology;  Laterality: N/A;    TREATMENT OF CARDIAC ARRHYTHMIA N/A 01/29/2020    Procedure: CARDIOVERSION;  Surgeon: Gabriel Hawley MD;  Location:  [facility] EP LAB;  Service: Cardiology;  Laterality: N/A;  af, demi, dccv, anes, mb, 345    TREATMENT OF CARDIAC ARRHYTHMIA  01/24/2022    Procedure: Cardioversion or Defibrillation;  Surgeon: Gabriel Hawley MD;  Location: [facility] EP LAB;  Service: Cardiology;;    WOUND DEBRIDEMENT Left 08/06/2020    Procedure: DEBRIDEMENT, WOUND;  Surgeon: SEMAJ Márquez III, MD;  Location: [facility] OR [address] [place] [address];  Service: Peripheral Vascular;  Laterality: Left;       Home Meds:   Prior to Admission medications    Medication Sig Start Date End Date Taking? Authorizing Provider   albuterol (VENTOLIN HFA) 90 mcg/actuation inhaler Inhale 2 puffs into the lungs every 6 (six) hours as needed for Wheezing. Rescue 1/8/25 1/8/26  Gordy Cordero MD   ALPRAZolam (XANAX) 0.5 MG tablet Take 1 tablet (0.5 mg total) by mouth 2 (two) times daily as needed for Anxiety. 2/4/25   Gordy Cordero MD   atorvastatin (LIPITOR) 80 MG tablet Take 1 tablet (80 mg total) by mouth once daily. 5/21/25 5/21/26  Niesha Brumfield PA-C   b complex vitamins capsule Take 1 capsule by mouth every other day.    Provider, Historical   DULoxetine (CYMBALTA) 60 MG capsule Take 1 capsule (60 mg total) by mouth once daily. 8/12/24   Brennon Nunez MD   ferrous sulfate 325 (65 FE) MG EC tablet Take 1 tablet (325 mg total) by mouth every other day. 9/18/24   Eugene Woodward MD   furosemide (LASIX) 20 MG tablet Take 1 tablet (20 mg total) by mouth once daily. 2/5/25 2/5/26  Gordy Cordero MD   ibuprofen (ADVIL,MOTRIN) 600 MG tablet Take 1 tablet (600 mg total) by mouth 2 (two) times daily as needed for Pain. 2/4/25   Gordy Cordero MD   lactulose (CONSTULOSE) 10 gram/15 mL solution Take 30 mLs (20 g total) by mouth 3 (three) times daily. Please ensure you are having at least 2-3 bowel movements every day. 4/3/25   Kiko Saba MD   metoprolol succinate (TOPROL-XL) 100 MG 24 hr tablet Take 1 tablet (100 mg total) by mouth 2 (two) times daily. 2/5/25 2/5/26  Dale  Marah HAIR NP   multivitamin (THERAGRAN) per tablet Take 1 tablet by mouth once daily.    Provider, Historical   nystatin (MYCOSTATIN) powder Apply topically 2 (two) times daily. 5/7/25   Gordy Cordero MD   omeprazole (PRILOSEC) 20 MG capsule TAKE 1 CAPSULE BY MOUTH ONCE DAILY 7/25/24   Gordy Cordero MD   ondansetron (ZOFRAN-ODT) 4 MG TbDL Take 2 tablets (8 mg total) by mouth every 8 (eight) hours as needed (nasuea, vomiting). 5/7/25   Gordy Cordero MD   oxyCODONE (ROXICODONE) 5 MG immediate release tablet Take 1 tablet (5 mg total) by mouth every 12 (twelve) hours as needed for Pain. 6/4/25   Gordy Cordero MD   spironolactone (ALDACTONE) 50 MG tablet Take 1 tablet (50 mg total) by mouth once daily. 6/4/25 12/31/25  Kiko Saba MD   traZODone (DESYREL) 100 MG tablet Take 100 mg by mouth every evening.    Provider, Historical   traZODone (DESYREL) 100 MG tablet Take 1 tablet (100 mg total) by mouth nightly as needed for Insomnia. 3/9/25   Gordy Cordero MD   medroxyPROGESTERone (PROVERA) 10 MG tablet Take 2 tablets (20 mg total) by mouth 3 (three) times daily. 7/13/22 8/9/22  Yolanda Barriga MD     Anticoagulants/Antiplatelets: no anticoagulation    Allergies:   Review of patient's allergies indicates:   Allergen Reactions    Flecainide Shortness Of Breath and Swelling    Shellfish containing products Other (See Comments)     Makes gout terrible    Vancomycin analogues Hives, Itching and Rash     Sedation History:  no adverse reactions    Review of Systems:   Hematological: no known coagulopathies  Respiratory: no shortness of breath  Cardiovascular: no chest pain  Gastrointestinal: no abdominal pain  Genito-Urinary: no dysuria  Musculoskeletal: negative  Neurological: no TIA or stroke symptoms         OBJECTIVE:     Vital Signs (Most Recent)  Pulse: (!) 59 (06/13/25 0834)  Resp: 18 (06/13/25 0834)  BP: (!) 150/69 (06/13/25 0834)  SpO2: (!) 94 % (06/13/25 0834)    Physical Exam:  ASA:  2  Mallampati: n/a    General: no acute distress  Mental Status: alert and oriented to person, place and time  HEENT: normocephalic, atraumatic  Chest: unlabored breathing  Heart: regular heart rate  Abdomen: distended  Extremity: moves all extremities    ASSESSMENT/PLAN:     Sedation Plan: local  Patient will undergo ultrasound guided paracentesis.    SCOUT Wolfe, FNP  Interventional Radiology  [phone] clinic

## 2025-06-13 NOTE — PLAN OF CARE
paracentesis completed, pt tolerated well. No apparent distress noted. 2800 mL removed from LLQ, mepore applied CDI. Albumin not given per protocol. Discharge instructions reviewed and acknowledged. Pt discharged via ambulation.

## 2025-06-13 NOTE — PROCEDURES
Radiology Post-Procedure Note    Pre Op Diagnosis: Ascites  Post Op Diagnosis: Same    Procedure: Ultrasound Guided Paracentesis    Procedure performed by: Jannet CUNNINGHAM, Samantha     Written Informed Consent Obtained: Yes  Specimen Removed: YES serous  Estimated Blood Loss: Minimal    Findings:   Successful LLQ paracentesis.  Albumin administered PRN per protocol.    Patient tolerated procedure well.    Samantha Power, APRN, FNP  Interventional Radiology  [phone] clinic

## 2025-06-15 ENCOUNTER — RESULTS FOLLOW-UP (OUTPATIENT)
Dept: TRANSPLANT | Facility: CLINIC | Age: 60
End: 2025-06-15

## 2025-06-16 ENCOUNTER — PATIENT MESSAGE (OUTPATIENT)
Dept: HEPATOLOGY | Facility: CLINIC | Age: 60
End: 2025-06-16
Payer: COMMERCIAL

## 2025-06-16 ENCOUNTER — TELEPHONE (OUTPATIENT)
Dept: HEPATOLOGY | Facility: CLINIC | Age: 60
End: 2025-06-16
Payer: MEDICAID

## 2025-06-16 DIAGNOSIS — R18.8 CIRRHOSIS OF LIVER WITH ASCITES, UNSPECIFIED HEPATIC CIRRHOSIS TYPE: Primary | Chronic | ICD-10-CM

## 2025-06-16 DIAGNOSIS — K74.60 CIRRHOSIS OF LIVER WITH ASCITES, UNSPECIFIED HEPATIC CIRRHOSIS TYPE: Primary | Chronic | ICD-10-CM

## 2025-06-16 NOTE — TELEPHONE ENCOUNTER
----- Message from Kiko Saba MD sent at 6/15/2025  3:29 PM CDT -----  Can we ask he to hold spironolactone and repeat labs mid-week due high K (I am out of town)  ----- Message -----  From: Lab, Background User  Sent: 6/13/2025   8:57 AM CDT  To: Kiko Saba MD

## 2025-06-16 NOTE — TELEPHONE ENCOUNTER
--- Message from Dr Kiko Saba ---  Can we ask her to hold spironolactone and repeat labs mid-week due high K.      Call placed to the Patient at [phone].  No Answer. Left detailed VM message.    Will send the Patient a MY Chart message also.

## 2025-06-18 ENCOUNTER — LAB VISIT (OUTPATIENT)
Dept: LAB | Facility: HOSPITAL | Age: 60
End: 2025-06-18
Attending: INTERNAL MEDICINE
Payer: MEDICAID

## 2025-06-18 DIAGNOSIS — R18.8 CIRRHOSIS OF LIVER WITH ASCITES, UNSPECIFIED HEPATIC CIRRHOSIS TYPE: Chronic | ICD-10-CM

## 2025-06-18 DIAGNOSIS — K74.60 CIRRHOSIS OF LIVER WITH ASCITES, UNSPECIFIED HEPATIC CIRRHOSIS TYPE: Chronic | ICD-10-CM

## 2025-06-18 LAB — POTASSIUM SERPL-SCNC: 4.9 MMOL/L (ref 3.5–5.1)

## 2025-06-18 PROCEDURE — 84132 ASSAY OF SERUM POTASSIUM: CPT

## 2025-06-18 PROCEDURE — 36415 COLL VENOUS BLD VENIPUNCTURE: CPT

## 2025-06-20 ENCOUNTER — HOSPITAL ENCOUNTER (OUTPATIENT)
Dept: INTERVENTIONAL RADIOLOGY/VASCULAR | Facility: HOSPITAL | Age: 60
Discharge: HOME | End: 2025-06-20
Attending: INTERNAL MEDICINE
Payer: MEDICAID

## 2025-06-20 VITALS
SYSTOLIC BLOOD PRESSURE: 136 MMHG | RESPIRATION RATE: 17 BRPM | OXYGEN SATURATION: 99 % | HEART RATE: 59 BPM | DIASTOLIC BLOOD PRESSURE: 62 MMHG

## 2025-06-20 DIAGNOSIS — R18.8 OTHER ASCITES: Chronic | ICD-10-CM

## 2025-06-20 DIAGNOSIS — K74.60 CIRRHOSIS OF LIVER WITH ASCITES, UNSPECIFIED HEPATIC CIRRHOSIS TYPE: Chronic | ICD-10-CM

## 2025-06-20 DIAGNOSIS — R18.8 CIRRHOSIS OF LIVER WITH ASCITES, UNSPECIFIED HEPATIC CIRRHOSIS TYPE: Chronic | ICD-10-CM

## 2025-06-20 PROCEDURE — 49083 ABD PARACENTESIS W/IMAGING: CPT | Performed by: RADIOLOGY

## 2025-06-20 PROCEDURE — 63600175 PHARM REV CODE 636 W HCPCS: Performed by: FAMILY MEDICINE

## 2025-06-20 PROCEDURE — P9047 ALBUMIN (HUMAN), 25%, 50ML: HCPCS | Performed by: FAMILY MEDICINE

## 2025-06-20 PROCEDURE — 49083 ABD PARACENTESIS W/IMAGING: CPT | Mod: ,,, | Performed by: FAMILY MEDICINE

## 2025-06-20 PROCEDURE — C1729 CATH, DRAINAGE: HCPCS

## 2025-06-20 RX ORDER — ALBUMIN HUMAN 250 G/1000ML
SOLUTION INTRAVENOUS
Status: COMPLETED | OUTPATIENT
Start: 2025-06-20 | End: 2025-06-20

## 2025-06-20 RX ORDER — ALBUMIN HUMAN 250 G/1000ML
SOLUTION INTRAVENOUS
Status: DISPENSED
Start: 2025-06-20 | End: 2025-06-20

## 2025-06-20 RX ADMIN — ALBUMIN (HUMAN) 25 G: 12.5 SOLUTION INTRAVENOUS at 10:06

## 2025-06-20 NOTE — PROCEDURES
Radiology Post-Procedure Note    Pre Op Diagnosis: Ascites  Post Op Diagnosis: Same    Procedure: Ultrasound Guided Paracentesis    Procedure performed by: Jannet CUNNINGHAM, Samantha     Written Informed Consent Obtained: Yes  Specimen Removed: YES serous  Estimated Blood Loss: Minimal    Findings:   Successful LLQ paracentesis.  Albumin administered PRN per protocol.    Patient tolerated procedure well.    Samantha Power, APRN, FNP  Interventional Radiology  [phone] clinic

## 2025-06-20 NOTE — H&P
Radiology History & Physical      SUBJECTIVE:     Chief Complaint: abdominal distention    History of Present Illness:  Vicky Alvarado is a 60 y.o. female who presents for ultrasound guided paracentesis  Past Medical History:   Diagnosis Date    Anticoagulant long-term use     Arthritis     Atrial fibrillation     cardioversion    Atrial fibrillation with RVR     HAS WATCHMAN IN PLACE NOW    Avascular necrosis     L hand    Cellulitis of extremity 05/07/2022    CHF (congestive heart failure)     Chronic fatigue     Cirrhosis of liver with ascites     Depression     Diabetes mellitus     Encounter for blood transfusion 07/22/2020    Encounter for blood transfusion 03/2022    Fatty liver     GERD (gastroesophageal reflux disease)     Hx of psychiatric care     Hypertension     Iron deficiency anemia 05/07/2022    TIBC 444 with saturated iron 8    Left leg cellulitis 05/07/2022    Liver disease     Obese     Pre-diabetes 05/07/2022    Psychiatric problem     Sleep apnea     wears cpap    SOB (shortness of breath) on exertion     Weight loss     75lb intentional weight loss     Past Surgical History:   Procedure Laterality Date    ABLATION OF ARRHYTHMOGENIC FOCUS FOR ATRIAL FIBRILLATION N/A 07/20/2020    Procedure: Ablation atrial fibrillation;  Surgeon: Gabriel Hawley MD;  Location: [facility] EP LAB;  Service: Cardiology;  Laterality: N/A;  afib, PVI, RFA, REENA, SHADY, anes, MB, 3 Prep    ABLATION OF ARRHYTHMOGENIC FOCUS FOR ATRIAL FIBRILLATION N/A 01/24/2022    Procedure: Ablation atrial fibrillation;  Surgeon: Gabriel Hawley MD;  Location: [facility] EP LAB;  Service: Cardiology;  Laterality: N/A;  afib/afl, PVI (re-do)/CTI, RFA, REENA (cx if SR), SHADY, anes, MB, 3 Prep    APPLICATION OF WOUND VACUUM-ASSISTED CLOSURE DEVICE Left 08/03/2020    Procedure: APPLICATION, WOUND VAC;  Surgeon: SEMAJ Márquez III, MD;  Location: [facility] OR [address] [place] [address];  Service: Peripheral Vascular;  Laterality: Left;    APPLICATION OF WOUND  VACUUM-ASSISTED CLOSURE DEVICE Left 08/06/2020    Procedure: APPLICATION, WOUND VAC;  Surgeon: SEMAJ Márquez III, MD;  Location: [facility] OR 2ND FLR;  Service: Peripheral Vascular;  Laterality: Left;    CARPAL TUNNEL RELEASE Right 06/10/2020    Procedure: RELEASE, CARPAL TUNNEL - RIGHT;  Surgeon: Adelaida Hall MD;  Location: [facility] OR;  Service: Orthopedics;  Laterality: Right;  GENERAL AND REGIONAL    CARPAL TUNNEL RELEASE Left 05/05/2021    Procedure: RELEASE, CARPAL TUNNEL, LEFT;  Surgeon: Adelaida Hall MD;  Location: [facility] OR;  Service: Orthopedics;  Laterality: Left;  GENERAL & REGIONAL IN PACU    CARPECTOMY Left 09/06/2023    Procedure: CARPECTOMY;  Surgeon: Adelaida Hall MD;  Location: [facility] OR;  Service: Orthopedics;  Laterality: Left;  MAC/REGIONAL  LEFT PROXIMAL ROW    CLOSURE OF WOUND Left 08/06/2020    Procedure: CLOSURE, WOUND;  Surgeon: SEMAJ Márquez III, MD;  Location: [facility] OR 2ND FLR;  Service: Peripheral Vascular;  Laterality: Left;  Complex    COLONOSCOPY N/A 08/17/2021    Procedure: COLONOSCOPY Suprep;  Surgeon: Loco Deluca MD;  Location: [facility];  Service: Endoscopy;  Laterality: N/A;    ECHOCARDIOGRAM,TRANSESOPHAGEAL N/A 07/24/2023    Procedure: Transesophageal echo (REENA) intra-procedure log documentation;  Surgeon: Leyla Diagnostic Provider;  Location: [facility] EP LAB;  Service: Cardiology;  Laterality: N/A;  s/p WM, REENA, anes, 3 Prep    ESOPHAGOGASTRODUODENOSCOPY N/A 08/17/2021    Procedure: EGD (ESOPHAGOGASTRODUODENOSCOPY);  Surgeon: Loco Deluca MD;  Location: [facility] ENDO;  Service: Endoscopy;  Laterality: N/A;  Patient is schedule to have her Covid test on 08/14/2021 at the [facility] @ 9:30am. [place].    EXPLORATION OF FEMORAL ARTERY Left 07/21/2020    Procedure: EXPLORATION, ARTERY, FEMORAL;  Surgeon: SEMAJ Márquez III, MD;  Location: [facility] OR 2ND FLR;  Service: Peripheral Vascular;  Laterality: Left;  1. Urgent Direct repair, L SFA branch  laceration    FOOT SURGERY      HYSTEROSCOPY WITH DILATION AND CURETTAGE OF UTERUS N/A 02/19/2022    Procedure: HYSTEROSCOPY, WITH DILATION AND CURETTAGE OF UTERUS;  Surgeon: Shane Palomo MD;  Location: [address];  Service: OB/GYN;  Laterality: N/A;    INCISION AND DRAINAGE OF KNEE Left 05/12/2022    Procedure: INCISION AND DRAINAGE, Prepatellar bursectomy.;  Surgeon: Dre Guzmán MD;  [address];  Service: Orthopedics;  Laterality: Left;    LEFT HEART CATHETERIZATION Left 02/07/2023    Procedure: Left heart cath;  Surgeon: Josiah Terry MD;  Location: [facility] CATH LAB;  Service: Cardiology;  Laterality: Left;  borderline moderate bleeding risk 6.3%    OCCLUSION OF LEFT ATRIAL APPENDAGE N/A 06/12/2023    Procedure: Left atrial appendage occlusion;  Surgeon: Gabriel Hawley MD;  Location: [facility] EP LAB;  Service: Cardiology;  Laterality: N/A;  afib, watchman, BSCI, demi, ALIYA ibarra, 3prep    RELEASE OF ULNAR NERVE AT CUBITAL TUNNEL Bilateral     RIGHT HEART CATHETERIZATION Right 08/05/2024    Procedure: INSERTION, CATHETER, RIGHT HEART;  Surgeon: Zane Liu MD;  Location: [facility] CATH LAB;  Service: Cardiology;  Laterality: Right;    ROBOT-ASSISTED LAPAROSCOPIC ABDOMINAL HYSTERECTOMY USING DA KEIRY XI N/A 08/09/2022    Procedure: XI ROBOTIC HYSTERECTOMY;  Surgeon: Yolanda Barriga MD;  Location: [facility];  Service: OB/GYN;  Laterality: N/A;  dual console requested    ROBOT-ASSISTED LAPAROSCOPIC SALPINGO-OOPHORECTOMY Bilateral 08/09/2022    Procedure: ROBOTIC SALPINGO-OOPHORECTOMY;  Surgeon: Yolanda Barriga MD;  Location: [facility];  Service: OB/GYN;  Laterality: Bilateral;    TRANSESOPHAGEAL ECHOCARDIOGRAPHY N/A 06/12/2023    Procedure: ECHOCARDIOGRAM, TRANSESOPHAGEAL;  Surgeon: Cyril Mast MD;  Location: [facility] EP LAB;  Service: Cardiology;  Laterality: N/A;    TREATMENT OF CARDIAC ARRHYTHMIA N/A 01/29/2020    Procedure: CARDIOVERSION;  Surgeon: Gabriel Hawley MD;  Location:  [facility] EP LAB;  Service: Cardiology;  Laterality: N/A;  af, demi, dccv, anes, mb, 345    TREATMENT OF CARDIAC ARRHYTHMIA  01/24/2022    Procedure: Cardioversion or Defibrillation;  Surgeon: Gabriel Hawley MD;  Location: [facility] EP LAB;  Service: Cardiology;;    WOUND DEBRIDEMENT Left 08/06/2020    Procedure: DEBRIDEMENT, WOUND;  Surgeon: SEMAJ Márquez III, MD;  Location: [facility] OR [address] [place] [address];  Service: Peripheral Vascular;  Laterality: Left;       Home Meds:   Prior to Admission medications    Medication Sig Start Date End Date Taking? Authorizing Provider   albuterol (VENTOLIN HFA) 90 mcg/actuation inhaler Inhale 2 puffs into the lungs every 6 (six) hours as needed for Wheezing. Rescue 1/8/25 1/8/26  Gordy Cordero MD   ALPRAZolam (XANAX) 0.5 MG tablet Take 1 tablet (0.5 mg total) by mouth 2 (two) times daily as needed for Anxiety. 2/4/25   Gordy Cordero MD   atorvastatin (LIPITOR) 80 MG tablet Take 1 tablet (80 mg total) by mouth once daily. 5/21/25 5/21/26  Niesha Brumfield PA-C   b complex vitamins capsule Take 1 capsule by mouth every other day.    Provider, Historical   DULoxetine (CYMBALTA) 60 MG capsule Take 1 capsule (60 mg total) by mouth once daily. 8/12/24   Brennon Nunez MD   ferrous sulfate 325 (65 FE) MG EC tablet Take 1 tablet (325 mg total) by mouth every other day. 9/18/24   Eugene Woodward MD   furosemide (LASIX) 20 MG tablet Take 1 tablet (20 mg total) by mouth once daily. 2/5/25 2/5/26  Gordy Cordero MD   ibuprofen (ADVIL,MOTRIN) 600 MG tablet Take 1 tablet (600 mg total) by mouth 2 (two) times daily as needed for Pain. 2/4/25   Gordy Cordero MD   lactulose (CONSTULOSE) 10 gram/15 mL solution Take 30 mLs (20 g total) by mouth 3 (three) times daily. Please ensure you are having at least 2-3 bowel movements every day. 4/3/25   Kiko Saba MD   metoprolol succinate (TOPROL-XL) 100 MG 24 hr tablet Take 1 tablet (100 mg total) by mouth 2 (two) times daily. 2/5/25 2/5/26  Dale  Marah HAIR NP   multivitamin (THERAGRAN) per tablet Take 1 tablet by mouth once daily.    Provider, Historical   nystatin (MYCOSTATIN) powder Apply topically 2 (two) times daily. 5/7/25   Gordy Cordero MD   omeprazole (PRILOSEC) 20 MG capsule TAKE 1 CAPSULE BY MOUTH ONCE DAILY 7/25/24   Gordy Cordero MD   ondansetron (ZOFRAN-ODT) 4 MG TbDL Take 2 tablets (8 mg total) by mouth every 8 (eight) hours as needed (nasuea, vomiting). 5/7/25   Gordy Cordero MD   oxyCODONE (ROXICODONE) 5 MG immediate release tablet Take 1 tablet (5 mg total) by mouth every 12 (twelve) hours as needed for Pain. 6/4/25   Gordy Cordero MD   spironolactone (ALDACTONE) 50 MG tablet Take 1 tablet (50 mg total) by mouth once daily. 6/4/25 12/31/25  Kiko Saba MD   traZODone (DESYREL) 100 MG tablet Take 100 mg by mouth every evening.    Provider, Historical   traZODone (DESYREL) 100 MG tablet Take 1 tablet (100 mg total) by mouth nightly as needed for Insomnia. 3/9/25   Gordy Cordero MD   medroxyPROGESTERone (PROVERA) 10 MG tablet Take 2 tablets (20 mg total) by mouth 3 (three) times daily. 7/13/22 8/9/22  Yolanda Barriga MD     Anticoagulants/Antiplatelets: no anticoagulation    Allergies:   Review of patient's allergies indicates:   Allergen Reactions    Flecainide Shortness Of Breath and Swelling    Shellfish containing products Other (See Comments)     Makes gout terrible    Vancomycin analogues Hives, Itching and Rash     Sedation History:  no adverse reactions    Review of Systems:   Hematological: no known coagulopathies  Respiratory: no shortness of breath  Cardiovascular: no chest pain  Gastrointestinal: no abdominal pain  Genito-Urinary: no dysuria  Musculoskeletal: negative  Neurological: no TIA or stroke symptoms         OBJECTIVE:     Vital Signs (Most Recent)  Pulse: 72 (06/20/25 0850)  Resp: 16 (06/20/25 0850)  BP: 131/62 (06/20/25 0850)  SpO2: 99 % (06/20/25 0850)    Physical Exam:  ASA: 2  Mallampati:  n/a    General: no acute distress  Mental Status: alert and oriented to person, place and time  HEENT: normocephalic, atraumatic  Chest: unlabored breathing  Heart: regular heart rate  Abdomen: distended  Extremity: moves all extremities    ASSESSMENT/PLAN:     Sedation Plan: local  Patient will undergo ultrasound guided paracentesis.    SCOUT Wolfe, FNP  Interventional Radiology  [phone] [facility]

## 2025-06-20 NOTE — PLAN OF CARE
Paracentesis completed, pt tolerated well. No apparent distress noted. 3 Liters of ascites removed from abdomen, mepore applied CDI. Labs collected and sent. Albumin 100 ml given per algorithm.  Discharge instructions reviewed and acknowledged. Pt discharged via walker and private vehicle.

## 2025-06-27 ENCOUNTER — HOSPITAL ENCOUNTER (OUTPATIENT)
Dept: INTERVENTIONAL RADIOLOGY/VASCULAR | Facility: HOSPITAL | Age: 60
Discharge: HOME | End: 2025-06-27
Attending: INTERNAL MEDICINE | Admitting: INTERNAL MEDICINE
Payer: MEDICAID

## 2025-06-27 VITALS
OXYGEN SATURATION: 95 % | HEART RATE: 55 BPM | SYSTOLIC BLOOD PRESSURE: 122 MMHG | DIASTOLIC BLOOD PRESSURE: 59 MMHG | RESPIRATION RATE: 16 BRPM

## 2025-06-27 DIAGNOSIS — K74.60 CIRRHOSIS OF LIVER WITH ASCITES, UNSPECIFIED HEPATIC CIRRHOSIS TYPE: Chronic | ICD-10-CM

## 2025-06-27 DIAGNOSIS — R18.8 CIRRHOSIS OF LIVER WITH ASCITES, UNSPECIFIED HEPATIC CIRRHOSIS TYPE: Chronic | ICD-10-CM

## 2025-06-27 DIAGNOSIS — R18.8 OTHER ASCITES: Chronic | ICD-10-CM

## 2025-06-27 PROCEDURE — C1894 INTRO/SHEATH, NON-LASER: HCPCS

## 2025-06-27 PROCEDURE — P9047 ALBUMIN (HUMAN), 25%, 50ML: HCPCS

## 2025-06-27 PROCEDURE — C1729 CATH, DRAINAGE: HCPCS

## 2025-06-27 PROCEDURE — 63600175 PHARM REV CODE 636 W HCPCS

## 2025-06-27 PROCEDURE — 49083 ABD PARACENTESIS W/IMAGING: CPT | Performed by: RADIOLOGY

## 2025-06-27 PROCEDURE — 63600175 PHARM REV CODE 636 W HCPCS: Performed by: FAMILY MEDICINE

## 2025-06-27 PROCEDURE — 49083 ABD PARACENTESIS W/IMAGING: CPT | Mod: ,,, | Performed by: FAMILY MEDICINE

## 2025-06-27 RX ORDER — ALBUMIN HUMAN 250 G/1000ML
SOLUTION INTRAVENOUS
Status: COMPLETED
Start: 2025-06-27 | End: 2025-06-27

## 2025-06-27 RX ORDER — LIDOCAINE HYDROCHLORIDE 10 MG/ML
INJECTION, SOLUTION EPIDURAL; INFILTRATION; INTRACAUDAL; PERINEURAL
Status: COMPLETED | OUTPATIENT
Start: 2025-06-27 | End: 2025-06-27

## 2025-06-27 RX ADMIN — ALBUMIN (HUMAN) 25 G: 12.5 SOLUTION INTRAVENOUS at 10:06

## 2025-06-27 RX ADMIN — LIDOCAINE HYDROCHLORIDE 5 ML: 10 SOLUTION INTRAVENOUS at 09:06

## 2025-06-27 NOTE — PROCEDURES
Radiology Post-Procedure Note    Pre Op Diagnosis: Ascites  Post Op Diagnosis: Same    Procedure: Ultrasound Guided Paracentesis    Procedure performed by: Jannet CUNNINGHAM, Samantha     Written Informed Consent Obtained: Yes  Specimen Removed: YES serous  Estimated Blood Loss: Minimal    Findings:   Successful LLQ paracentesis.  Albumin administered PRN per protocol.    Patient tolerated procedure well.    Samantha Power, APRN, FNP  Interventional Radiology  [phone] clinic

## 2025-06-27 NOTE — PLAN OF CARE
Paracentesis completed, pt tolerated well. No apparent distress noted. 3300 mililiters removed from LLQ, mepore applied CDI. Albumin 100 ml given per protocol.  Discharge instructions reviewed and acknowledged. Pt discharged with family and private vehicle.

## 2025-06-27 NOTE — H&P
Radiology History & Physical      SUBJECTIVE:     Chief Complaint: abdominal distention    History of Present Illness:  Vicky Alvarado is a 60 y.o. female who presents for ultrasound guided paracentesis  Past Medical History:   Diagnosis Date    Anticoagulant long-term use     Arthritis     Atrial fibrillation     cardioversion    Atrial fibrillation with RVR     HAS WATCHMAN IN PLACE NOW    Avascular necrosis     L hand    Cellulitis of extremity 05/07/2022    CHF (congestive heart failure)     Chronic fatigue     Cirrhosis of liver with ascites     Depression     Diabetes mellitus     Encounter for blood transfusion 07/22/2020    Encounter for blood transfusion 03/2022    Fatty liver     GERD (gastroesophageal reflux disease)     Hx of psychiatric care     Hypertension     Iron deficiency anemia 05/07/2022    TIBC 444 with saturated iron 8    Left leg cellulitis 05/07/2022    Liver disease     Obese     Pre-diabetes 05/07/2022    Psychiatric problem     Sleep apnea     wears cpap    SOB (shortness of breath) on exertion     Weight loss     75lb intentional weight loss     Past Surgical History:   Procedure Laterality Date    ABLATION OF ARRHYTHMOGENIC FOCUS FOR ATRIAL FIBRILLATION N/A 07/20/2020    Procedure: Ablation atrial fibrillation;  Surgeon: Gabriel Hawley MD;  Location: [facility] EP LAB;  Service: Cardiology;  Laterality: N/A;  afib, PVI, RFA, REENA, SHADY, anes, MB, 3 Prep    ABLATION OF ARRHYTHMOGENIC FOCUS FOR ATRIAL FIBRILLATION N/A 01/24/2022    Procedure: Ablation atrial fibrillation;  Surgeon: Gabriel Hawley MD;  Location: [facility] EP LAB;  Service: Cardiology;  Laterality: N/A;  afib/afl, PVI (re-do)/CTI, RFA, REENA (cx if SR), SHADY, anes, MB, 3 Prep    APPLICATION OF WOUND VACUUM-ASSISTED CLOSURE DEVICE Left 08/03/2020    Procedure: APPLICATION, WOUND VAC;  Surgeon: SEMAJ Márquez III, MD;  Location: [facility] OR [address] [place] [address];  Service: Peripheral Vascular;  Laterality: Left;    APPLICATION OF WOUND  VACUUM-ASSISTED CLOSURE DEVICE Left 08/06/2020    Procedure: APPLICATION, WOUND VAC;  Surgeon: SEMAJ Márquez III, MD;  Location: [facility] OR 2ND FLR;  Service: Peripheral Vascular;  Laterality: Left;    CARPAL TUNNEL RELEASE Right 06/10/2020    Procedure: RELEASE, CARPAL TUNNEL - RIGHT;  Surgeon: Adelaida Hall MD;  Location: [facility] OR;  Service: Orthopedics;  Laterality: Right;  GENERAL AND REGIONAL    CARPAL TUNNEL RELEASE Left 05/05/2021    Procedure: RELEASE, CARPAL TUNNEL, LEFT;  Surgeon: Adelaida Hall MD;  Location: [facility] OR;  Service: Orthopedics;  Laterality: Left;  GENERAL & REGIONAL IN PACU    CARPECTOMY Left 09/06/2023    Procedure: CARPECTOMY;  Surgeon: Adelaida Hall MD;  Location: [facility] OR;  Service: Orthopedics;  Laterality: Left;  MAC/REGIONAL  LEFT PROXIMAL ROW    CLOSURE OF WOUND Left 08/06/2020    Procedure: CLOSURE, WOUND;  Surgeon: SEMAJ Márquez III, MD;  Location: [facility] OR 2ND FLR;  Service: Peripheral Vascular;  Laterality: Left;  Complex    COLONOSCOPY N/A 08/17/2021    Procedure: COLONOSCOPY Suprep;  Surgeon: Loco Deluca MD;  Location: [facility];  Service: Endoscopy;  Laterality: N/A;    ECHOCARDIOGRAM,TRANSESOPHAGEAL N/A 07/24/2023    Procedure: Transesophageal echo (REENA) intra-procedure log documentation;  Surgeon: Leyla Diagnostic Provider;  Location: [facility] EP LAB;  Service: Cardiology;  Laterality: N/A;  s/p WM, REENA, anes, 3 Prep    ESOPHAGOGASTRODUODENOSCOPY N/A 08/17/2021    Procedure: EGD (ESOPHAGOGASTRODUODENOSCOPY);  Surgeon: Loco Deluca MD;  Location: [facility] ENDO;  Service: Endoscopy;  Laterality: N/A;  Patient is schedule to have her Covid test on 08/14/2021 at the [facility] @ 9:30am. [place].    EXPLORATION OF FEMORAL ARTERY Left 07/21/2020    Procedure: EXPLORATION, ARTERY, FEMORAL;  Surgeon: SEMAJ Márquez III, MD;  Location: [facility] OR 2ND FLR;  Service: Peripheral Vascular;  Laterality: Left;  1. Urgent Direct repair, L SFA branch  laceration    FOOT SURGERY      HYSTEROSCOPY WITH DILATION AND CURETTAGE OF UTERUS N/A 02/19/2022    Procedure: HYSTEROSCOPY, WITH DILATION AND CURETTAGE OF UTERUS;  Surgeon: Shane Palomo MD;  Location: [address];  Service: OB/GYN;  Laterality: N/A;    INCISION AND DRAINAGE OF KNEE Left 05/12/2022    Procedure: INCISION AND DRAINAGE, Prepatellar bursectomy.;  Surgeon: Dre Guzmán MD;  [address];  Service: Orthopedics;  Laterality: Left;    LEFT HEART CATHETERIZATION Left 02/07/2023    Procedure: Left heart cath;  Surgeon: Josiah Terry MD;  Location: [facility] CATH LAB;  Service: Cardiology;  Laterality: Left;  borderline moderate bleeding risk 6.3%    OCCLUSION OF LEFT ATRIAL APPENDAGE N/A 06/12/2023    Procedure: Left atrial appendage occlusion;  Surgeon: Gabriel aHwley MD;  Location: [facility] EP LAB;  Service: Cardiology;  Laterality: N/A;  afib, watchman, BSCI, demi, ALIYA ibarra, 3prep    RELEASE OF ULNAR NERVE AT CUBITAL TUNNEL Bilateral     RIGHT HEART CATHETERIZATION Right 08/05/2024    Procedure: INSERTION, CATHETER, RIGHT HEART;  Surgeon: Zane Liu MD;  Location: [facility] CATH LAB;  Service: Cardiology;  Laterality: Right;    ROBOT-ASSISTED LAPAROSCOPIC ABDOMINAL HYSTERECTOMY USING DA KEIRY XI N/A 08/09/2022    Procedure: XI ROBOTIC HYSTERECTOMY;  Surgeon: Yolanda Barriga MD;  Location: [facility];  Service: OB/GYN;  Laterality: N/A;  dual console requested    ROBOT-ASSISTED LAPAROSCOPIC SALPINGO-OOPHORECTOMY Bilateral 08/09/2022    Procedure: ROBOTIC SALPINGO-OOPHORECTOMY;  Surgeon: Yolanda Barriga MD;  Location: [facility];  Service: OB/GYN;  Laterality: Bilateral;    TRANSESOPHAGEAL ECHOCARDIOGRAPHY N/A 06/12/2023    Procedure: ECHOCARDIOGRAM, TRANSESOPHAGEAL;  Surgeon: Cyril Mast MD;  Location: [facility] EP LAB;  Service: Cardiology;  Laterality: N/A;    TREATMENT OF CARDIAC ARRHYTHMIA N/A 01/29/2020    Procedure: CARDIOVERSION;  Surgeon: Gabriel Hawley MD;  Location:  [facility] EP LAB;  Service: Cardiology;  Laterality: N/A;  af, demi, dccv, anes, mb, 345    TREATMENT OF CARDIAC ARRHYTHMIA  01/24/2022    Procedure: Cardioversion or Defibrillation;  Surgeon: Gabriel Hawley MD;  Location: [facility] EP LAB;  Service: Cardiology;;    WOUND DEBRIDEMENT Left 08/06/2020    Procedure: DEBRIDEMENT, WOUND;  Surgeon: SEMAJ Márquez III, MD;  Location: [facility] OR [address] [place] [address];  Service: Peripheral Vascular;  Laterality: Left;       Home Meds:   Prior to Admission medications    Medication Sig Start Date End Date Taking? Authorizing Provider   albuterol (VENTOLIN HFA) 90 mcg/actuation inhaler Inhale 2 puffs into the lungs every 6 (six) hours as needed for Wheezing. Rescue 1/8/25 1/8/26  Gordy Cordero MD   ALPRAZolam (XANAX) 0.5 MG tablet Take 1 tablet (0.5 mg total) by mouth 2 (two) times daily as needed for Anxiety. 2/4/25   Gordy Cordero MD   atorvastatin (LIPITOR) 80 MG tablet Take 1 tablet (80 mg total) by mouth once daily. 5/21/25 5/21/26  Niesha Brumfield PA-C   b complex vitamins capsule Take 1 capsule by mouth every other day.    Provider, Historical   DULoxetine (CYMBALTA) 60 MG capsule Take 1 capsule (60 mg total) by mouth once daily. 8/12/24   Brennon Nunez MD   ferrous sulfate 325 (65 FE) MG EC tablet Take 1 tablet (325 mg total) by mouth every other day. 9/18/24   Eugene Woodward MD   furosemide (LASIX) 20 MG tablet Take 1 tablet (20 mg total) by mouth once daily. 2/5/25 2/5/26  Gordy Cordero MD   ibuprofen (ADVIL,MOTRIN) 600 MG tablet Take 1 tablet (600 mg total) by mouth 2 (two) times daily as needed for Pain. 2/4/25   Gordy Cordero MD   lactulose (CONSTULOSE) 10 gram/15 mL solution Take 30 mLs (20 g total) by mouth 3 (three) times daily. Please ensure you are having at least 2-3 bowel movements every day. 4/3/25   Kiko Saba MD   metoprolol succinate (TOPROL-XL) 100 MG 24 hr tablet Take 1 tablet (100 mg total) by mouth 2 (two) times daily. 2/5/25 2/5/26  Dale  Marah HAIR NP   multivitamin (THERAGRAN) per tablet Take 1 tablet by mouth once daily.    Provider, Historical   nystatin (MYCOSTATIN) powder Apply topically 2 (two) times daily. 5/7/25   Gordy Cordero MD   omeprazole (PRILOSEC) 20 MG capsule TAKE 1 CAPSULE BY MOUTH ONCE DAILY 7/25/24   Gordy Cordero MD   ondansetron (ZOFRAN-ODT) 4 MG TbDL Take 2 tablets (8 mg total) by mouth every 8 (eight) hours as needed (nasuea, vomiting). 5/7/25   Gordy Cordero MD   oxyCODONE (ROXICODONE) 5 MG immediate release tablet Take 1 tablet (5 mg total) by mouth every 12 (twelve) hours as needed for Pain. 6/4/25   Gordy Cordero MD   spironolactone (ALDACTONE) 50 MG tablet Take 1 tablet (50 mg total) by mouth once daily. 6/4/25 12/31/25  Kiko Saba MD   traZODone (DESYREL) 100 MG tablet Take 100 mg by mouth every evening.    Provider, Historical   traZODone (DESYREL) 100 MG tablet Take 1 tablet (100 mg total) by mouth nightly as needed for Insomnia. 3/9/25   Gordy Cordero MD   medroxyPROGESTERone (PROVERA) 10 MG tablet Take 2 tablets (20 mg total) by mouth 3 (three) times daily. 7/13/22 8/9/22  Yolanda Barriga MD     Anticoagulants/Antiplatelets: no anticoagulation    Allergies:   Review of patient's allergies indicates:   Allergen Reactions    Flecainide Shortness Of Breath and Swelling    Shellfish containing products Other (See Comments)     Makes gout terrible    Vancomycin analogues Hives, Itching and Rash     Sedation History:  no adverse reactions    Review of Systems:   Hematological: no known coagulopathies  Respiratory: no shortness of breath  Cardiovascular: no chest pain  Gastrointestinal: no abdominal pain  Genito-Urinary: no dysuria  Musculoskeletal: negative  Neurological: no TIA or stroke symptoms         OBJECTIVE:     Vital Signs (Most Recent)       Physical Exam:  ASA: 2  Mallampati: n/a    General: no acute distress  Mental Status: alert and oriented to person, place and time  HEENT:  normocephalic, atraumatic  Chest: unlabored breathing  Heart: regular heart rate  Abdomen: distended  Extremity: moves all extremities    ASSESSMENT/PLAN:     Sedation Plan: local  Patient will undergo ultrasound guided paracentesis.    SCOUT Wolfe, FNP  Interventional Radiology  [phone] [facility]

## 2025-06-27 NOTE — DISCHARGE INSTRUCTIONS
Interventional Radiology Clinic   For complications   [phone]. Monday - Friday, 8:00 am - 4:00 pm    [phone] After hours and on holidays. Ask to speak with the interventional radiologist on call.     For Scheduling   [phone] Monday - Friday, 8:00 am - 4:00 pm

## 2025-07-02 ENCOUNTER — HOSPITAL ENCOUNTER (OUTPATIENT)
Dept: INTERVENTIONAL RADIOLOGY/VASCULAR | Facility: HOSPITAL | Age: 60
Discharge: HOME | End: 2025-07-02
Attending: INTERNAL MEDICINE
Payer: MEDICAID

## 2025-07-02 VITALS
SYSTOLIC BLOOD PRESSURE: 135 MMHG | DIASTOLIC BLOOD PRESSURE: 78 MMHG | RESPIRATION RATE: 15 BRPM | OXYGEN SATURATION: 95 % | HEART RATE: 59 BPM

## 2025-07-02 DIAGNOSIS — R18.8 CIRRHOSIS OF LIVER WITH ASCITES, UNSPECIFIED HEPATIC CIRRHOSIS TYPE: Chronic | ICD-10-CM

## 2025-07-02 DIAGNOSIS — K74.60 CIRRHOSIS OF LIVER WITH ASCITES, UNSPECIFIED HEPATIC CIRRHOSIS TYPE: Chronic | ICD-10-CM

## 2025-07-02 DIAGNOSIS — R18.8 OTHER ASCITES: Chronic | ICD-10-CM

## 2025-07-02 PROCEDURE — 63600175 PHARM REV CODE 636 W HCPCS: Performed by: INTERNAL MEDICINE

## 2025-07-02 PROCEDURE — C1729 CATH, DRAINAGE: HCPCS

## 2025-07-02 PROCEDURE — 63600175 PHARM REV CODE 636 W HCPCS: Performed by: PHYSICIAN ASSISTANT

## 2025-07-02 PROCEDURE — P9047 ALBUMIN (HUMAN), 25%, 50ML: HCPCS | Performed by: INTERNAL MEDICINE

## 2025-07-02 RX ORDER — ALBUMIN HUMAN 250 G/1000ML
SOLUTION INTRAVENOUS
Status: COMPLETED
Start: 2025-07-02 | End: 2025-07-02

## 2025-07-02 RX ORDER — LIDOCAINE HYDROCHLORIDE 10 MG/ML
INJECTION, SOLUTION INFILTRATION; PERINEURAL
Status: COMPLETED | OUTPATIENT
Start: 2025-07-02 | End: 2025-07-02

## 2025-07-02 RX ORDER — ALBUMIN HUMAN 250 G/1000ML
SOLUTION INTRAVENOUS
Status: COMPLETED | OUTPATIENT
Start: 2025-07-02 | End: 2025-07-02

## 2025-07-02 RX ADMIN — LIDOCAINE HYDROCHLORIDE 10 ML: 10 INJECTION, SOLUTION INFILTRATION; PERINEURAL at 08:07

## 2025-07-02 RX ADMIN — ALBUMIN (HUMAN) 25 G: 12.5 SOLUTION INTRAVENOUS at 09:07

## 2025-07-02 NOTE — PLAN OF CARE
Paracentesis completed, pt tolerated well. No apparent distress noted. 3550 ml removed from LLQ, mepore applied CDI. Creatinine 1.6, albumin 100 ml given per protocol.  Discharge instructions reviewed and acknowledged by patient. Pt discharged via walker, steady gait observed.

## 2025-07-02 NOTE — DISCHARGE INSTRUCTIONS
Discharge Instructions    Follow-up Care:  Follow-up care is an important part of your treatment and safety. Be sure to go to follow up appointments , and follow up with your primary care provider/ physician that sent you to IR.      Diet and Medication:  1. Unless specified on your personalized discharge instructions, continue previous medications and/or diet recommendations.   2. For questions about this please contact your primary care provider or the provider who ordered your drain.     Activity:   If you have an open wound do not submerge that area.    If you have additional questions ask your provider.     If you have any of the following symptoms, call the Interventional Radiology Clinic    Increased pain at the procedure site   Increased or more short of breath    Continuous vomiting    Back or abdominal pain - this may indicate an infection    Fever (greater than 100.6 degrees F) or chills    Foul smelling drainage or abnormal bleeding from the procedure site    Interventional Radiology Clinic   For complications   [phone]. Monday - Friday, 8:00 am - 4:00 pm    [phone] After hours and on holidays. Ask to speak with the interventional radiologist on call.     Call 911 if you have any of the following signs and symptoms, or if you think you need emergency care.    Shortness of breath    Chest pain    Passing out, fainting (loss of consciousness)   Uncontrolled bleeding   Confusion    For Scheduling   [phone] Monday - Friday, 8:00 am - 4:00 pm

## 2025-07-02 NOTE — H&P
Radiology History & Physical      SUBJECTIVE:     Chief Complaint: abdominal distention    History of Present Illness:  Vicky Alvarado is a 60 y.o. female who presents for ultrasound guided paracentesis  Past Medical History:   Diagnosis Date    Anticoagulant long-term use     Arthritis     Atrial fibrillation     cardioversion    Atrial fibrillation with RVR     HAS WATCHMAN IN PLACE NOW    Avascular necrosis     L hand    Cellulitis of extremity 05/07/2022    CHF (congestive heart failure)     Chronic fatigue     Cirrhosis of liver with ascites     Depression     Diabetes mellitus     Encounter for blood transfusion 07/22/2020    Encounter for blood transfusion 03/2022    Fatty liver     GERD (gastroesophageal reflux disease)     Hx of psychiatric care     Hypertension     Iron deficiency anemia 05/07/2022    TIBC 444 with saturated iron 8    Left leg cellulitis 05/07/2022    Liver disease     Obese     Pre-diabetes 05/07/2022    Psychiatric problem     Sleep apnea     wears cpap    SOB (shortness of breath) on exertion     Weight loss     75lb intentional weight loss     Past Surgical History:   Procedure Laterality Date    ABLATION OF ARRHYTHMOGENIC FOCUS FOR ATRIAL FIBRILLATION N/A 07/20/2020    Procedure: Ablation atrial fibrillation;  Surgeon: Gabriel Hawley MD;  Location: [facility] EP LAB;  Service: Cardiology;  Laterality: N/A;  afib, PVI, RFA, REENA, SHADY, anes, MB, 3 Prep    ABLATION OF ARRHYTHMOGENIC FOCUS FOR ATRIAL FIBRILLATION N/A 01/24/2022    Procedure: Ablation atrial fibrillation;  Surgeon: Gabriel Hawley MD;  Location: [facility] EP LAB;  Service: Cardiology;  Laterality: N/A;  afib/afl, PVI (re-do)/CTI, RFA, REENA (cx if SR), SHADY, anes, MB, 3 Prep    APPLICATION OF WOUND VACUUM-ASSISTED CLOSURE DEVICE Left 08/03/2020    Procedure: APPLICATION, WOUND VAC;  Surgeon: SEMAJ Márquez III, MD;  Location: [facility] OR [address] [place] [address];  Service: Peripheral Vascular;  Laterality: Left;    APPLICATION OF WOUND  VACUUM-ASSISTED CLOSURE DEVICE Left 08/06/2020    Procedure: APPLICATION, WOUND VAC;  Surgeon: SEMAJ Márquez III, MD;  Location: [facility] OR 2ND FLR;  Service: Peripheral Vascular;  Laterality: Left;    CARPAL TUNNEL RELEASE Right 06/10/2020    Procedure: RELEASE, CARPAL TUNNEL - RIGHT;  Surgeon: Adelaida Hall MD;  Location: [facility] OR;  Service: Orthopedics;  Laterality: Right;  GENERAL AND REGIONAL    CARPAL TUNNEL RELEASE Left 05/05/2021    Procedure: RELEASE, CARPAL TUNNEL, LEFT;  Surgeon: Adelaida Hall MD;  Location: [facility] OR;  Service: Orthopedics;  Laterality: Left;  GENERAL & REGIONAL IN PACU    CARPECTOMY Left 09/06/2023    Procedure: CARPECTOMY;  Surgeon: Adelaida Hall MD;  Location: [facility] OR;  Service: Orthopedics;  Laterality: Left;  MAC/REGIONAL  LEFT PROXIMAL ROW    CLOSURE OF WOUND Left 08/06/2020    Procedure: CLOSURE, WOUND;  Surgeon: SEMAJ Márquez III, MD;  Location: [facility] OR 2ND FLR;  Service: Peripheral Vascular;  Laterality: Left;  Complex    COLONOSCOPY N/A 08/17/2021    Procedure: COLONOSCOPY Suprep;  Surgeon: Loco Deluca MD;  Location: [facility];  Service: Endoscopy;  Laterality: N/A;    ECHOCARDIOGRAM,TRANSESOPHAGEAL N/A 07/24/2023    Procedure: Transesophageal echo (REENA) intra-procedure log documentation;  Surgeon: Leyla Diagnostic Provider;  Location: [facility] EP LAB;  Service: Cardiology;  Laterality: N/A;  s/p WM, REENA, anes, 3 Prep    ESOPHAGOGASTRODUODENOSCOPY N/A 08/17/2021    Procedure: EGD (ESOPHAGOGASTRODUODENOSCOPY);  Surgeon: Loco Deluca MD;  Location: [facility] ENDO;  Service: Endoscopy;  Laterality: N/A;  Patient is schedule to have her Covid test on 08/14/2021 at the [facility] @ 9:30am. [place].    EXPLORATION OF FEMORAL ARTERY Left 07/21/2020    Procedure: EXPLORATION, ARTERY, FEMORAL;  Surgeon: SEMAJ Márquez III, MD;  Location: [facility] OR 2ND FLR;  Service: Peripheral Vascular;  Laterality: Left;  1. Urgent Direct repair, L SFA branch  laceration    FOOT SURGERY      HYSTEROSCOPY WITH DILATION AND CURETTAGE OF UTERUS N/A 02/19/2022    Procedure: HYSTEROSCOPY, WITH DILATION AND CURETTAGE OF UTERUS;  Surgeon: Shane Palomo MD;  Location: [address];  Service: OB/GYN;  Laterality: N/A;    INCISION AND DRAINAGE OF KNEE Left 05/12/2022    Procedure: INCISION AND DRAINAGE, Prepatellar bursectomy.;  Surgeon: Dre Guzmán MD;  [address];  Service: Orthopedics;  Laterality: Left;    LEFT HEART CATHETERIZATION Left 02/07/2023    Procedure: Left heart cath;  Surgeon: Josiah Terry MD;  Location: [facility] CATH LAB;  Service: Cardiology;  Laterality: Left;  borderline moderate bleeding risk 6.3%    OCCLUSION OF LEFT ATRIAL APPENDAGE N/A 06/12/2023    Procedure: Left atrial appendage occlusion;  Surgeon: Gabriel Hawley MD;  Location: [facility] EP LAB;  Service: Cardiology;  Laterality: N/A;  afib, watchman, BSCI, demi, ALIYA ibarra, 3prep    RELEASE OF ULNAR NERVE AT CUBITAL TUNNEL Bilateral     RIGHT HEART CATHETERIZATION Right 08/05/2024    Procedure: INSERTION, CATHETER, RIGHT HEART;  Surgeon: Zane Liu MD;  Location: [facility] CATH LAB;  Service: Cardiology;  Laterality: Right;    ROBOT-ASSISTED LAPAROSCOPIC ABDOMINAL HYSTERECTOMY USING DA KEIRY XI N/A 08/09/2022    Procedure: XI ROBOTIC HYSTERECTOMY;  Surgeon: Yolanda Barriga MD;  Location: [facility];  Service: OB/GYN;  Laterality: N/A;  dual console requested    ROBOT-ASSISTED LAPAROSCOPIC SALPINGO-OOPHORECTOMY Bilateral 08/09/2022    Procedure: ROBOTIC SALPINGO-OOPHORECTOMY;  Surgeon: Yolanda Barriga MD;  Location: [facility];  Service: OB/GYN;  Laterality: Bilateral;    TRANSESOPHAGEAL ECHOCARDIOGRAPHY N/A 06/12/2023    Procedure: ECHOCARDIOGRAM, TRANSESOPHAGEAL;  Surgeon: Cyril Mast MD;  Location: [facility] EP LAB;  Service: Cardiology;  Laterality: N/A;    TREATMENT OF CARDIAC ARRHYTHMIA N/A 01/29/2020    Procedure: CARDIOVERSION;  Surgeon: Gabriel Hawley MD;  Location:  [facility] EP LAB;  Service: Cardiology;  Laterality: N/A;  af, demi, dccv, anes, mb, 345    TREATMENT OF CARDIAC ARRHYTHMIA  01/24/2022    Procedure: Cardioversion or Defibrillation;  Surgeon: Gabriel Hawley MD;  Location: [facility] EP LAB;  Service: Cardiology;;    WOUND DEBRIDEMENT Left 08/06/2020    Procedure: DEBRIDEMENT, WOUND;  Surgeon: SEMAJ Márquez III, MD;  Location: [facility] OR [address] [place] [address];  Service: Peripheral Vascular;  Laterality: Left;       Home Meds:   Prior to Admission medications    Medication Sig Start Date End Date Taking? Authorizing Provider   albuterol (VENTOLIN HFA) 90 mcg/actuation inhaler Inhale 2 puffs into the lungs every 6 (six) hours as needed for Wheezing. Rescue 1/8/25 1/8/26  Gordy Cordero MD   ALPRAZolam (XANAX) 0.5 MG tablet Take 1 tablet (0.5 mg total) by mouth 2 (two) times daily as needed for Anxiety. 2/4/25   Gordy Cordero MD   atorvastatin (LIPITOR) 80 MG tablet Take 1 tablet (80 mg total) by mouth once daily. 5/21/25 5/21/26  Niesha Brumfield PA-C   b complex vitamins capsule Take 1 capsule by mouth every other day.    Provider, Historical   DULoxetine (CYMBALTA) 60 MG capsule Take 1 capsule (60 mg total) by mouth once daily. 8/12/24   Brennon Nunez MD   ferrous sulfate 325 (65 FE) MG EC tablet Take 1 tablet (325 mg total) by mouth every other day. 9/18/24   Eugene Woodward MD   furosemide (LASIX) 20 MG tablet Take 1 tablet (20 mg total) by mouth once daily. 2/5/25 2/5/26  Gordy Cordero MD   ibuprofen (ADVIL,MOTRIN) 600 MG tablet Take 1 tablet (600 mg total) by mouth 2 (two) times daily as needed for Pain. 2/4/25   Gordy Cordero MD   lactulose (CONSTULOSE) 10 gram/15 mL solution Take 30 mLs (20 g total) by mouth 3 (three) times daily. Please ensure you are having at least 2-3 bowel movements every day. 4/3/25   Kiko Saba MD   metoprolol succinate (TOPROL-XL) 100 MG 24 hr tablet Take 1 tablet (100 mg total) by mouth 2 (two) times daily. 2/5/25 2/5/26  Dale  Marah HAIR NP   multivitamin (THERAGRAN) per tablet Take 1 tablet by mouth once daily.    Provider, Historical   nystatin (MYCOSTATIN) powder Apply topically 2 (two) times daily. 5/7/25   Gordy Cordero MD   omeprazole (PRILOSEC) 20 MG capsule TAKE 1 CAPSULE BY MOUTH ONCE DAILY 7/25/24   Gordy Cordero MD   ondansetron (ZOFRAN-ODT) 4 MG TbDL Take 2 tablets (8 mg total) by mouth every 8 (eight) hours as needed (nasuea, vomiting). 5/7/25   Gordy Cordero MD   oxyCODONE (ROXICODONE) 5 MG immediate release tablet Take 1 tablet (5 mg total) by mouth every 12 (twelve) hours as needed for Pain. 6/4/25   Gordy Cordero MD   spironolactone (ALDACTONE) 50 MG tablet Take 1 tablet (50 mg total) by mouth once daily. 6/4/25 12/31/25  Kiko Saba MD   traZODone (DESYREL) 100 MG tablet Take 100 mg by mouth every evening.    Provider, Historical   traZODone (DESYREL) 100 MG tablet Take 1 tablet (100 mg total) by mouth nightly as needed for Insomnia. 3/9/25   Gordy Cordero MD   medroxyPROGESTERone (PROVERA) 10 MG tablet Take 2 tablets (20 mg total) by mouth 3 (three) times daily. 7/13/22 8/9/22  Yolanda Barriga MD     Anticoagulants/Antiplatelets: no anticoagulation    Allergies:   Review of patient's allergies indicates:   Allergen Reactions    Flecainide Shortness Of Breath and Swelling    Shellfish containing products Other (See Comments)     Makes gout terrible    Vancomycin analogues Hives, Itching and Rash     Sedation History:  no adverse reactions    Review of Systems:   Hematological: no known coagulopathies  Respiratory: no shortness of breath  Cardiovascular: no chest pain  Gastrointestinal: no abdominal pain  Genito-Urinary: no dysuria  Musculoskeletal: negative  Neurological: no TIA or stroke symptoms         OBJECTIVE:     Vital Signs (Most Recent)  Pulse: 66 (07/02/25 0817)  Resp: 16 (07/02/25 0817)  BP: (!) 162/72 (07/02/25 0817)  SpO2: 99 % (07/02/25 0817)    Physical Exam:  ASA:  2  Mallampati: n/a    General: no acute distress  Mental Status: alert and oriented to person, place and time  HEENT: normocephalic, atraumatic  Chest: unlabored breathing  Heart: regular heart rate  Abdomen: distended  Extremity: moves all extremities    ASSESSMENT/PLAN:     Sedation Plan: local  Patient will undergo ultrasound guided paracentesis.    Bushra Epps PA-C  Interventional Radiology   Spectra: [id number]

## 2025-07-02 NOTE — PROCEDURES
Radiology Post-Procedure Note    Pre Op Diagnosis: Ascites  Post Op Diagnosis: Same    Procedure: Ultrasound Guided Paracentesis    Procedure performed by: Bushra Epps PA-C    Written Informed Consent Obtained: Yes  Specimen Removed: YES, clear yellow fluid  Estimated Blood Loss: Minimal    Findings:   Successful paracentesis. Access obtained in the LUQ. Albumin administered PRN per protocol.    Patient tolerated procedure well.    Bushra Epps PA-C  Interventional Radiology   Spectra: [id number]

## 2025-07-02 NOTE — Clinical Note
Left: Abdomen.   Scrubbed with ChloroPrep With Tint.    Hair: N/A.  Skin prep dry before draping.  Prepped by: Bushra Epps PA-C 7/2/2025 8:30 AM.

## 2025-07-08 DIAGNOSIS — K76.0 METABOLIC DYSFUNCTION-ASSOCIATED STEATOTIC LIVER DISEASE (MASLD): Chronic | ICD-10-CM

## 2025-07-08 DIAGNOSIS — R18.8 CIRRHOSIS OF LIVER WITH ASCITES, UNSPECIFIED HEPATIC CIRRHOSIS TYPE: Chronic | ICD-10-CM

## 2025-07-08 DIAGNOSIS — K74.60 CIRRHOSIS OF LIVER WITH ASCITES, UNSPECIFIED HEPATIC CIRRHOSIS TYPE: Chronic | ICD-10-CM

## 2025-07-09 RX ORDER — LACTULOSE 10 G/15ML
20 SOLUTION ORAL; RECTAL 3 TIMES DAILY
Qty: 900 ML | Refills: 6 | Status: SHIPPED | OUTPATIENT
Start: 2025-07-09

## 2025-07-11 ENCOUNTER — HOSPITAL ENCOUNTER (OUTPATIENT)
Dept: INTERVENTIONAL RADIOLOGY/VASCULAR | Facility: HOSPITAL | Age: 60
Discharge: HOME | End: 2025-07-11
Attending: INTERNAL MEDICINE
Payer: MEDICAID

## 2025-07-11 VITALS
RESPIRATION RATE: 18 BRPM | SYSTOLIC BLOOD PRESSURE: 124 MMHG | HEART RATE: 66 BPM | OXYGEN SATURATION: 99 % | DIASTOLIC BLOOD PRESSURE: 60 MMHG

## 2025-07-11 DIAGNOSIS — R18.8 CIRRHOSIS OF LIVER WITH ASCITES, UNSPECIFIED HEPATIC CIRRHOSIS TYPE: Chronic | ICD-10-CM

## 2025-07-11 DIAGNOSIS — R18.8 OTHER ASCITES: Chronic | ICD-10-CM

## 2025-07-11 DIAGNOSIS — K74.60 CIRRHOSIS OF LIVER WITH ASCITES, UNSPECIFIED HEPATIC CIRRHOSIS TYPE: Chronic | ICD-10-CM

## 2025-07-11 PROCEDURE — 49083 ABD PARACENTESIS W/IMAGING: CPT | Performed by: RADIOLOGY

## 2025-07-11 RX ORDER — ALBUMIN HUMAN 250 G/1000ML
SOLUTION INTRAVENOUS
Status: DISPENSED
Start: 2025-07-11 | End: 2025-07-11

## 2025-07-11 NOTE — H&P
Radiology History & Physical      SUBJECTIVE:     Chief Complaint: Ascites    History of Present Illness:  Vicky Alvarado is a 60 y.o. female who presents for paracentesis  Past Medical History:   Diagnosis Date    Anticoagulant long-term use     Arthritis     Atrial fibrillation     cardioversion    Atrial fibrillation with RVR     HAS WATCHMAN IN PLACE NOW    Avascular necrosis     L hand    Cellulitis of extremity 05/07/2022    CHF (congestive heart failure)     Chronic fatigue     Cirrhosis of liver with ascites     Depression     Diabetes mellitus     Encounter for blood transfusion 07/22/2020    Encounter for blood transfusion 03/2022    Fatty liver     GERD (gastroesophageal reflux disease)     Hx of psychiatric care     Hypertension     Iron deficiency anemia 05/07/2022    TIBC 444 with saturated iron 8    Left leg cellulitis 05/07/2022    Liver disease     Obese     Pre-diabetes 05/07/2022    Psychiatric problem     Sleep apnea     wears cpap    SOB (shortness of breath) on exertion     Weight loss     75lb intentional weight loss     Past Surgical History:   Procedure Laterality Date    ABLATION OF ARRHYTHMOGENIC FOCUS FOR ATRIAL FIBRILLATION N/A 07/20/2020    Procedure: Ablation atrial fibrillation;  Surgeon: Gabriel Hawley MD;  Location: [facility] EP LAB;  Service: Cardiology;  Laterality: N/A;  afib, PVI, RFA, REENA, SHADY, anes, MB, 3 Prep    ABLATION OF ARRHYTHMOGENIC FOCUS FOR ATRIAL FIBRILLATION N/A 01/24/2022    Procedure: Ablation atrial fibrillation;  Surgeon: Gabriel Hawley MD;  Location: [facility] EP LAB;  Service: Cardiology;  Laterality: N/A;  afib/afl, PVI (re-do)/CTI, RFA, REENA (cx if SR), SHADY, anes, MB, 3 Prep    APPLICATION OF WOUND VACUUM-ASSISTED CLOSURE DEVICE Left 08/03/2020    Procedure: APPLICATION, WOUND VAC;  Surgeon: SEMAJ Márquez III, MD;  Location: [facility] OR [address] [place] [address];  Service: Peripheral Vascular;  Laterality: Left;    APPLICATION OF WOUND VACUUM-ASSISTED CLOSURE DEVICE Left  08/06/2020    Procedure: APPLICATION, WOUND VAC;  Surgeon: SEMAJ Márquez III, MD;  Location: [facility] OR 2ND FLR;  Service: Peripheral Vascular;  Laterality: Left;    CARPAL TUNNEL RELEASE Right 06/10/2020    Procedure: RELEASE, CARPAL TUNNEL - RIGHT;  Surgeon: Adelaida Hall MD;  Location: [facility] OR;  Service: Orthopedics;  Laterality: Right;  GENERAL AND REGIONAL    CARPAL TUNNEL RELEASE Left 05/05/2021    Procedure: RELEASE, CARPAL TUNNEL, LEFT;  Surgeon: Adelaida Hall MD;  Location: [facility] OR;  Service: Orthopedics;  Laterality: Left;  GENERAL & REGIONAL IN PACU    CARPECTOMY Left 09/06/2023    Procedure: CARPECTOMY;  Surgeon: Adelaida Hall MD;  Location: [facility] OR;  Service: Orthopedics;  Laterality: Left;  MAC/REGIONAL  LEFT PROXIMAL ROW    CLOSURE OF WOUND Left 08/06/2020    Procedure: CLOSURE, WOUND;  Surgeon: SEMAJ Márquez III, MD;  Location: [facility] OR 2ND FLR;  Service: Peripheral Vascular;  Laterality: Left;  Complex    COLONOSCOPY N/A 08/17/2021    Procedure: COLONOSCOPY Suprep;  Surgeon: Loco Deluca MD;  Location: [facility];  Service: Endoscopy;  Laterality: N/A;    ECHOCARDIOGRAM,TRANSESOPHAGEAL N/A 07/24/2023    Procedure: Transesophageal echo (REENA) intra-procedure log documentation;  Surgeon: Leyla Diagnostic Provider;  Location: [facility] EP LAB;  Service: Cardiology;  Laterality: N/A;  s/p WM, REENA, anes, 3 Prep    ESOPHAGOGASTRODUODENOSCOPY N/A 08/17/2021    Procedure: EGD (ESOPHAGOGASTRODUODENOSCOPY);  Surgeon: Loco Deluca MD;  Location: [facility] ENDO;  Service: Endoscopy;  Laterality: N/A;  Patient is schedule to have her Covid test on 08/14/2021 at the [facility] @ 9:30am. [place].    EXPLORATION OF FEMORAL ARTERY Left 07/21/2020    Procedure: EXPLORATION, ARTERY, FEMORAL;  Surgeon: SEMAJ Márquez III, MD;  Location: [facility] OR 2ND FLR;  Service: Peripheral Vascular;  Laterality: Left;  1. Urgent Direct repair, L SFA branch laceration    FOOT SURGERY       HYSTEROSCOPY WITH DILATION AND CURETTAGE OF UTERUS N/A 02/19/2022    Procedure: HYSTEROSCOPY, WITH DILATION AND CURETTAGE OF UTERUS;  Surgeon: Shane Palomo MD;  [address]  Service: OB/GYN;  Laterality: N/A;    INCISION AND DRAINAGE OF KNEE Left 05/12/2022    Procedure: INCISION AND DRAINAGE, Prepatellar bursectomy.;  Surgeon: Dre Guzmán MD;  [address]  Service: Orthopedics;  Laterality: Left;    LEFT HEART CATHETERIZATION Left 02/07/2023    Procedure: Left heart cath;  Surgeon: Josiah Terry MD;  Location: [facility] CATH LAB;  Service: Cardiology;  Laterality: Left;  borderline moderate bleeding risk 6.3%    OCCLUSION OF LEFT ATRIAL APPENDAGE N/A 06/12/2023    Procedure: Left atrial appendage occlusion;  Surgeon: Gabriel Hawley MD;  Location: [facility] EP LAB;  Service: Cardiology;  Laterality: N/A;  afib, watchman, BSCI, demi, ALIYA ibarra, 3prep    RELEASE OF ULNAR NERVE AT CUBITAL TUNNEL Bilateral     RIGHT HEART CATHETERIZATION Right 08/05/2024    Procedure: INSERTION, CATHETER, RIGHT HEART;  Surgeon: Zane Liu MD;  Location: [facility] CATH LAB;  Service: Cardiology;  Laterality: Right;    ROBOT-ASSISTED LAPAROSCOPIC ABDOMINAL HYSTERECTOMY USING DA KEIRY XI N/A 08/09/2022    Procedure: XI ROBOTIC HYSTERECTOMY;  Surgeon: Yolanda Barriga MD;  Location: [facility];  Service: OB/GYN;  Laterality: N/A;  dual console requested    ROBOT-ASSISTED LAPAROSCOPIC SALPINGO-OOPHORECTOMY Bilateral 08/09/2022    Procedure: ROBOTIC SALPINGO-OOPHORECTOMY;  Surgeon: Yolanda Barriga MD;  Location: [facility];  Service: OB/GYN;  Laterality: Bilateral;    TRANSESOPHAGEAL ECHOCARDIOGRAPHY N/A 06/12/2023    Procedure: ECHOCARDIOGRAM, TRANSESOPHAGEAL;  Surgeon: Cyril Mast MD;  Location: [facility] EP LAB;  Service: Cardiology;  Laterality: N/A;    TREATMENT OF CARDIAC ARRHYTHMIA N/A 01/29/2020    Procedure: CARDIOVERSION;  Surgeon: Gabriel Hawley MD;  Location: [facility] EP LAB;  Service:  Cardiology;  Laterality: N/A;  af, demi, dccv, anes, mb, 345    TREATMENT OF CARDIAC ARRHYTHMIA  01/24/2022    Procedure: Cardioversion or Defibrillation;  Surgeon: Gabriel Hawley MD;  Location: [facility] EP LAB;  Service: Cardiology;;    WOUND DEBRIDEMENT Left 08/06/2020    Procedure: DEBRIDEMENT, WOUND;  Surgeon: SEMAJ Márquez III, MD;  Location: [facility] OR [address] [place] [address];  Service: Peripheral Vascular;  Laterality: Left;       Home Meds:   Prior to Admission medications    Medication Sig Start Date End Date Taking? Authorizing Provider   albuterol (VENTOLIN HFA) 90 mcg/actuation inhaler Inhale 2 puffs into the lungs every 6 (six) hours as needed for Wheezing. Rescue 1/8/25 1/8/26  Gordy Cordero MD   ALPRAZolam (XANAX) 0.5 MG tablet Take 1 tablet (0.5 mg total) by mouth 2 (two) times daily as needed for Anxiety. 7/8/25   Gordy Cordero MD   atorvastatin (LIPITOR) 80 MG tablet Take 1 tablet (80 mg total) by mouth once daily. 5/21/25 5/21/26  Niesha Brumfield PA-C   b complex vitamins capsule Take 1 capsule by mouth every other day.    Provider, Historical   DULoxetine (CYMBALTA) 60 MG capsule Take 1 capsule (60 mg total) by mouth once daily. 8/12/24   Brennon Nunez MD   ferrous sulfate 325 (65 FE) MG EC tablet Take 1 tablet (325 mg total) by mouth every other day. 9/18/24   Eugene Woodward MD   furosemide (LASIX) 20 MG tablet Take 1 tablet (20 mg total) by mouth once daily. 2/5/25 2/5/26  Gordy Cordero MD   ibuprofen (ADVIL,MOTRIN) 600 MG tablet Take 1 tablet (600 mg total) by mouth 2 (two) times daily as needed for Pain. 2/4/25   Gordy Cordero MD   lactulose (CONSTULOSE) 10 gram/15 mL solution Take 30 mLs (20 g total) by mouth 3 (three) times daily. Please ensure you are having at least 2-3 bowel movements every day. 7/9/25   Kiko Saba MD   metoprolol succinate (TOPROL-XL) 100 MG 24 hr tablet Take 1 tablet (100 mg total) by mouth 2 (two) times daily. 2/5/25 2/5/26  Marah Hunter, NP    multivitamin (THERAGRAN) per tablet Take 1 tablet by mouth once daily.    Provider, Historical   nystatin (MYCOSTATIN) powder Apply topically 2 (two) times daily. 5/7/25   Gordy Cordero MD   omeprazole (PRILOSEC) 20 MG capsule TAKE 1 CAPSULE BY MOUTH ONCE DAILY 7/8/25   Gordy Cordero MD   ondansetron (ZOFRAN-ODT) 4 MG TbDL Take 2 tablets (8 mg total) by mouth every 8 (eight) hours as needed (nasuea, vomiting). 5/7/25   Gordy Cordero MD   oxyCODONE (ROXICODONE) 5 MG immediate release tablet Take 1 tablet (5 mg total) by mouth every 12 (twelve) hours as needed for Pain. 7/8/25   Gordy Cordero MD   spironolactone (ALDACTONE) 50 MG tablet Take 1 tablet (50 mg total) by mouth once daily. 6/4/25 12/31/25  Kiko Saba MD   traZODone (DESYREL) 100 MG tablet Take 100 mg by mouth every evening.    Provider, Historical   traZODone (DESYREL) 100 MG tablet Take 1 tablet (100 mg total) by mouth nightly as needed for Insomnia. 3/9/25   Gordy Cordero MD   medroxyPROGESTERone (PROVERA) 10 MG tablet Take 2 tablets (20 mg total) by mouth 3 (three) times daily. 7/13/22 8/9/22  Yolanda Barriga MD     Anticoagulants/Antiplatelets: no anticoagulation    Allergies:   Review of patient's allergies indicates:   Allergen Reactions    Flecainide Shortness Of Breath and Swelling    Shellfish containing products Other (See Comments)     Makes gout terrible    Vancomycin analogues Hives, Itching and Rash     Sedation History:  no adverse reactions    Review of Systems:   Hematological: no known coagulopathies  Respiratory: no shortness of breath  Cardiovascular: no chest pain  Gastrointestinal: no abdominal pain  Genito-Urinary: no dysuria  Musculoskeletal: negative  Neurological: no TIA or stroke symptoms         OBJECTIVE:     Vital Signs (Most Recent)       Physical Exam:  ASA: 3  Mallampati: 2    General: no acute distress  Mental Status: alert and oriented to person, place and time  HEENT: normocephalic,  atraumatic  Chest: unlabored breathing  Heart: regular heart rate  Abdomen: nondistended  Extremity: moves all extremities    Laboratory  Lab Results   Component Value Date    INR 1.0 05/08/2025       Lab Results   Component Value Date    WBC 6.47 05/08/2025    HGB 11.6 (L) 05/08/2025    HCT 38.5 05/08/2025    MCV 86 05/08/2025     05/08/2025      Lab Results   Component Value Date     06/13/2025     06/13/2025    K 4.9 06/18/2025     06/13/2025    CO2 28 06/13/2025    BUN 31 (H) 06/13/2025    CREATININE 1.6 (H) 06/13/2025    CALCIUM 9.3 06/13/2025    MG 1.7 09/18/2024    ALT 6 (L) 06/13/2025    AST 16 06/13/2025    ALBUMIN 3.2 (L) 06/13/2025    BILITOT 0.5 06/13/2025    BILIDIR 0.8 (H) 08/02/2024       ASSESSMENT/PLAN:     Sedation Plan: local  Patient will undergo ultrasound guided paracentesis.    Written consent was obtained from the patient. All questions answered.     Christo Rice MD  Radiology PGY-2

## 2025-07-11 NOTE — CARE UPDATE
Paracentesis complete. 5000 mLs peritoneal fluid drained. Pt tolerated well. Dressing to left abd clean, dry, and intact. Albumin 25% given 100 mLs. Specimens sent per lab order. Report called to floor nurse.

## 2025-07-11 NOTE — PROCEDURES
Interventional Radiology Post-Procedure Note       Pre Op Diagnosis: ascites  Post Op Diagnosis: Same    Procedure: ultrasound guided paracentesis    Procedure performed by: Christo Rice    Written Informed Consent Obtained: Yes  Specimen Removed: YES clear yellow fluid  Estimated Blood Loss: Minimal    Findings:   Successful ultrasound guided paracentesis of LLQ    Patient tolerated procedure well.    Christo Rice MD   Interventional Radiology PGY-2  7/11/2025

## 2025-07-11 NOTE — DISCHARGE INSTRUCTIONS
Interventional Radiology Clinic   For complications   [phone]. Monday - Friday, 8:00 am - 4:00 pm    [phone] After hours and on holidays. Ask to speak with the interventional radiologist on call.     For Scheduling   [phone] Monday - Friday, 8:00 am - 4:00 pm

## 2025-07-17 DIAGNOSIS — K74.60 CIRRHOSIS OF LIVER WITH ASCITES, UNSPECIFIED HEPATIC CIRRHOSIS TYPE: Primary | ICD-10-CM

## 2025-07-17 DIAGNOSIS — R18.8 CIRRHOSIS OF LIVER WITH ASCITES, UNSPECIFIED HEPATIC CIRRHOSIS TYPE: Primary | ICD-10-CM

## 2025-07-17 DIAGNOSIS — K76.0 METABOLIC DYSFUNCTION-ASSOCIATED STEATOTIC LIVER DISEASE (MASLD): ICD-10-CM

## 2025-07-18 ENCOUNTER — HOSPITAL ENCOUNTER (OUTPATIENT)
Dept: INTERVENTIONAL RADIOLOGY/VASCULAR | Facility: HOSPITAL | Age: 60
Discharge: HOME | End: 2025-07-18
Attending: INTERNAL MEDICINE
Payer: MEDICAID

## 2025-07-18 VITALS
HEART RATE: 71 BPM | OXYGEN SATURATION: 100 % | RESPIRATION RATE: 16 BRPM | SYSTOLIC BLOOD PRESSURE: 155 MMHG | DIASTOLIC BLOOD PRESSURE: 73 MMHG

## 2025-07-18 DIAGNOSIS — R18.8 CIRRHOSIS OF LIVER WITH ASCITES, UNSPECIFIED HEPATIC CIRRHOSIS TYPE: Chronic | ICD-10-CM

## 2025-07-18 DIAGNOSIS — K74.60 CIRRHOSIS OF LIVER WITH ASCITES, UNSPECIFIED HEPATIC CIRRHOSIS TYPE: Chronic | ICD-10-CM

## 2025-07-18 DIAGNOSIS — R18.8 OTHER ASCITES: Chronic | ICD-10-CM

## 2025-07-18 PROCEDURE — 49083 ABD PARACENTESIS W/IMAGING: CPT | Performed by: RADIOLOGY

## 2025-07-18 PROCEDURE — 63600175 PHARM REV CODE 636 W HCPCS

## 2025-07-18 RX ORDER — LIDOCAINE HYDROCHLORIDE 10 MG/ML
INJECTION, SOLUTION INFILTRATION; PERINEURAL
Status: COMPLETED | OUTPATIENT
Start: 2025-07-18 | End: 2025-07-18

## 2025-07-18 RX ADMIN — LIDOCAINE HYDROCHLORIDE 10 ML: 10 INJECTION, SOLUTION INFILTRATION; PERINEURAL at 09:07

## 2025-07-18 NOTE — Clinical Note
Left: Abdomen.   Scrubbed with ChloroPrep With Tint.    Hair: N/A.  Skin prep dry before draping.  Prepped by: Elvin Mendez MD 7/18/2025 9:55 AM.

## 2025-07-18 NOTE — PLAN OF CARE
Paracentesis completed, pt tolerated well. No apparent distress noted. 2700 ml removed from LLQ, mepore applied CDI. No albumin given per protocol.  Discharge instructions reviewed and acknowledged by patient. Pt discharged via walker, steady gait observed.

## 2025-07-18 NOTE — DISCHARGE INSTRUCTIONS
Discharge Instructions    Follow-up Care:  Follow-up care is an important part of your treatment and safety. Be sure to go to follow up appointments , and follow up with your primary care provider/ physician that sent you to IR.      Diet and Medication:  1. Unless specified on your personalized discharge instructions, continue previous medications and/or diet recommendations.   2. For questions about this please contact your primary care provider or the provider who ordered your drain.     Activity:   If you have an open wound do not submerge that area.    If you have additional questions ask your provider.     If you have any of the following symptoms, call the Interventional Radiology Clinic    Increased pain at the procedure site   Increased or more short of breath    Continuous vomiting    Back or abdominal pain - this may indicate an infection    Fever (greater than 100.6 degrees F) or chills    Foul smelling drainage or abnormal bleeding from the procedure site    Interventional Radiology Clinic   For complications   [phone]. Monday - Friday, 8:00 am - 4:00 pm    [phone] After hours and on holidays. Ask to speak with the interventional radiologist on call.     Call 911 if you have any of the following signs and symptoms, or if you think you need emergency care.    Shortness of breath    Chest pain    Passing out, fainting (loss of consciousness)   Uncontrolled bleeding   Confusion    For Scheduling   [phone] Monday - Friday, 8:00 am - 4:00 pm

## 2025-07-18 NOTE — PLAN OF CARE
Pt arrived to mpu room 3 for paracentesis, no acute distress noted. Orders and labs reviewed on chart. Awaiting consent.

## 2025-07-18 NOTE — H&P
Radiology History & Physical      SUBJECTIVE:     History of Present Illness:  Vicky Alvarado is a 60 y.o. female who presents for paracentesis.     Past Medical History:   Diagnosis Date    Anticoagulant long-term use     Arthritis     Atrial fibrillation     cardioversion    Atrial fibrillation with RVR     HAS WATCHMAN IN PLACE NOW    Avascular necrosis     L hand    Cellulitis of extremity 05/07/2022    CHF (congestive heart failure)     Chronic fatigue     Cirrhosis of liver with ascites     Depression     Diabetes mellitus     Encounter for blood transfusion 07/22/2020    Encounter for blood transfusion 03/2022    Fatty liver     GERD (gastroesophageal reflux disease)     Hx of psychiatric care     Hypertension     Iron deficiency anemia 05/07/2022    TIBC 444 with saturated iron 8    Left leg cellulitis 05/07/2022    Liver disease     Obese     Pre-diabetes 05/07/2022    Psychiatric problem     Sleep apnea     wears cpap    SOB (shortness of breath) on exertion     Weight loss     75lb intentional weight loss     Past Surgical History:   Procedure Laterality Date    ABLATION OF ARRHYTHMOGENIC FOCUS FOR ATRIAL FIBRILLATION N/A 07/20/2020    Procedure: Ablation atrial fibrillation;  Surgeon: Gabriel Hawley MD;  Location: [facility] EP LAB;  Service: Cardiology;  Laterality: N/A;  afib, PVI, RFA, REENA, SHADY, anes, MB, 3 Prep    ABLATION OF ARRHYTHMOGENIC FOCUS FOR ATRIAL FIBRILLATION N/A 01/24/2022    Procedure: Ablation atrial fibrillation;  Surgeon: Gabriel Hawley MD;  Location: [facility] EP LAB;  Service: Cardiology;  Laterality: N/A;  afib/afl, PVI (re-do)/CTI, RFA, REENA (cx if SR), SHADY, anes, MB, 3 Prep    APPLICATION OF WOUND VACUUM-ASSISTED CLOSURE DEVICE Left 08/03/2020    Procedure: APPLICATION, WOUND VAC;  Surgeon: SEMAJ Márquez III, MD;  Location: [facility] OR [address] [place] [address];  Service: Peripheral Vascular;  Laterality: Left;    APPLICATION OF WOUND VACUUM-ASSISTED CLOSURE DEVICE Left 08/06/2020     Procedure: APPLICATION, WOUND VAC;  Surgeon: SEMAJ Márquez III, MD;  Location: [facility] OR [facility] FLR;  Service: Peripheral Vascular;  Laterality: Left;    CARPAL TUNNEL RELEASE Right 06/10/2020    Procedure: RELEASE, CARPAL TUNNEL - RIGHT;  Surgeon: Adelaida Hall MD;  Location: [facility] OR;  Service: Orthopedics;  Laterality: Right;  GENERAL AND REGIONAL    CARPAL TUNNEL RELEASE Left 05/05/2021    Procedure: RELEASE, CARPAL TUNNEL, LEFT;  Surgeon: Aedlaida Hall MD;  Location: [facility] OR;  Service: Orthopedics;  Laterality: Left;  GENERAL & REGIONAL IN PACU    CARPECTOMY Left 09/06/2023    Procedure: CARPECTOMY;  Surgeon: Adelaida Hall MD;  Location: [facility] OR;  Service: Orthopedics;  Laterality: Left;  MAC/REGIONAL  LEFT PROXIMAL ROW    CLOSURE OF WOUND Left 08/06/2020    Procedure: CLOSURE, WOUND;  Surgeon: SEMAJ Márquez III, MD;  [facility];  Service: Peripheral Vascular;  Laterality: Left;  Complex    COLONOSCOPY N/A 08/17/2021    Procedure: COLONOSCOPY Suprep;  Surgeon: Loco Deluca MD;  Location: [facility];  Service: Endoscopy;  Laterality: N/A;    ECHOCARDIOGRAM,TRANSESOPHAGEAL N/A 07/24/2023    Procedure: Transesophageal echo (REENA) intra-procedure log documentation;  Surgeon: Edmond Diagnostic Provider;  Location: [facility] EP LAB;  Service: Cardiology;  Laterality: N/A;  s/p WM, REENA, anes, 3 Prep    ESOPHAGOGASTRODUODENOSCOPY N/A 08/17/2021    Procedure: EGD (ESOPHAGOGASTRODUODENOSCOPY);  Surgeon: Loco Deluca MD;  Location: [facility];  Service: Endoscopy;  Laterality: N/A;  Patient is schedule to have her Covid test on 08/14/2021 at the [facility] @ 9:30am. [place].    EXPLORATION OF FEMORAL ARTERY Left 07/21/2020    Procedure: EXPLORATION, ARTERY, FEMORAL;  Surgeon: SEMAJ Márquez III, MD;  Location: [facility] OR [facility] FLR;  Service: Peripheral Vascular;  Laterality: Left;  1. Urgent Direct repair, L SFA branch laceration    FOOT SURGERY      HYSTEROSCOPY WITH  DILATION AND CURETTAGE OF UTERUS N/A 02/19/2022    Procedure: HYSTEROSCOPY, WITH DILATION AND CURETTAGE OF UTERUS;  Surgeon: Shane Palomo MD;  [address]  Service: OB/GYN;  Laterality: N/A;    INCISION AND DRAINAGE OF KNEE Left 05/12/2022    Procedure: INCISION AND DRAINAGE, Prepatellar bursectomy.;  Surgeon: Dre Guzmán MD;  [address]  Service: Orthopedics;  Laterality: Left;    LEFT HEART CATHETERIZATION Left 02/07/2023    Procedure: Left heart cath;  Surgeon: Josiah Terry MD;  Location: [facility] CATH LAB;  Service: Cardiology;  Laterality: Left;  borderline moderate bleeding risk 6.3%    OCCLUSION OF LEFT ATRIAL APPENDAGE N/A 06/12/2023    Procedure: Left atrial appendage occlusion;  Surgeon: Gabriel Hawley MD;  Location: [facility] EP LAB;  Service: Cardiology;  Laterality: N/A;  afib, watchman, BSCI, demi, anes, MB, 3prep    RELEASE OF ULNAR NERVE AT CUBITAL TUNNEL Bilateral     RIGHT HEART CATHETERIZATION Right 08/05/2024    Procedure: INSERTION, CATHETER, RIGHT HEART;  Surgeon: Zane Liu MD;  Location: [facility] CATH LAB;  Service: Cardiology;  Laterality: Right;    ROBOT-ASSISTED LAPAROSCOPIC ABDOMINAL HYSTERECTOMY USING DA KEIRY XI N/A 08/09/2022    Procedure: XI ROBOTIC HYSTERECTOMY;  Surgeon: Yolanda Barriga MD;  Location: [facility];  Service: OB/GYN;  Laterality: N/A;  dual console requested    ROBOT-ASSISTED LAPAROSCOPIC SALPINGO-OOPHORECTOMY Bilateral 08/09/2022    Procedure: ROBOTIC SALPINGO-OOPHORECTOMY;  Surgeon: Yolanda Barriga MD;  Location: [facility];  Service: OB/GYN;  Laterality: Bilateral;    TRANSESOPHAGEAL ECHOCARDIOGRAPHY N/A 06/12/2023    Procedure: ECHOCARDIOGRAM, TRANSESOPHAGEAL;  Surgeon: Cyril Mast MD;  Location: [facility] EP LAB;  Service: Cardiology;  Laterality: N/A;    TREATMENT OF CARDIAC ARRHYTHMIA N/A 01/29/2020    Procedure: CARDIOVERSION;  Surgeon: Gabriel Hawley MD;  Location: [facility] EP LAB;  Service: Cardiology;  Laterality:  N/A;  af, demi, dccv, fred, mb, 345    TREATMENT OF CARDIAC ARRHYTHMIA  01/24/2022    Procedure: Cardioversion or Defibrillation;  Surgeon: Gabriel Hawley MD;  Location: [facility] EP LAB;  Service: Cardiology;;    WOUND DEBRIDEMENT Left 08/06/2020    Procedure: DEBRIDEMENT, WOUND;  Surgeon: SEMAJ Márquez III, MD;  Location: [facility] OR [address] [place] [address];  Service: Peripheral Vascular;  Laterality: Left;       Home Meds:   Prior to Admission medications    Medication Sig Start Date End Date Taking? Authorizing Provider   albuterol (VENTOLIN HFA) 90 mcg/actuation inhaler Inhale 2 puffs into the lungs every 6 (six) hours as needed for Wheezing. Rescue 1/8/25 1/8/26  Gordy Cordero MD   ALPRAZolam (XANAX) 0.5 MG tablet Take 1 tablet (0.5 mg total) by mouth 2 (two) times daily as needed for Anxiety. 7/8/25   Gordy Cordero MD   atorvastatin (LIPITOR) 80 MG tablet Take 1 tablet (80 mg total) by mouth once daily. 5/21/25 5/21/26  Niesha Brumfield PA-C   b complex vitamins capsule Take 1 capsule by mouth every other day.    Provider, [place]   DULoxetine (CYMBALTA) 60 MG capsule Take 1 capsule (60 mg total) by mouth once daily. 8/12/24   Brennon Nunez MD   ferrous sulfate 325 (65 FE) MG EC tablet Take 1 tablet (325 mg total) by mouth every other day. 9/18/24   Eugene Woodward MD   furosemide (LASIX) 20 MG tablet Take 1 tablet (20 mg total) by mouth once daily. 2/5/25 2/5/26  Gordy Cordero MD   ibuprofen (ADVIL,MOTRIN) 600 MG tablet Take 1 tablet (600 mg total) by mouth 2 (two) times daily as needed for Pain. 2/4/25   Gordy Cordero MD   lactulose (CONSTULOSE) 10 gram/15 mL solution Take 30 mLs (20 g total) by mouth 3 (three) times daily. Please ensure you are having at least 2-3 bowel movements every day. 7/9/25   Kiko Saba MD   metoprolol succinate (TOPROL-XL) 100 MG 24 hr tablet Take 1 tablet (100 mg total) by mouth 2 (two) times daily. 2/5/25 2/5/26  Marah Hunter, NP   multivitamin (THERAGRAN) per tablet  Take 1 tablet by mouth once daily.    Provider, Historical   nystatin (MYCOSTATIN) powder Apply topically 2 (two) times daily. 5/7/25   Gordy Cordero MD   omeprazole (PRILOSEC) 20 MG capsule TAKE 1 CAPSULE BY MOUTH ONCE DAILY 7/8/25   Gordy Cordero MD   ondansetron (ZOFRAN-ODT) 4 MG TbDL Take 2 tablets (8 mg total) by mouth every 8 (eight) hours as needed (nasuea, vomiting). 5/7/25   Gordy Cordero MD   oxyCODONE (ROXICODONE) 5 MG immediate release tablet Take 1 tablet (5 mg total) by mouth every 12 (twelve) hours as needed for Pain. 7/8/25   Gordy Cordero MD   spironolactone (ALDACTONE) 50 MG tablet Take 1 tablet (50 mg total) by mouth once daily. 6/4/25 12/31/25  Kiko Saba MD   traZODone (DESYREL) 100 MG tablet Take 100 mg by mouth every evening.    Provider, Historical   traZODone (DESYREL) 100 MG tablet Take 1 tablet (100 mg total) by mouth nightly as needed for Insomnia. 3/9/25   Gordy Cordero MD   medroxyPROGESTERone (PROVERA) 10 MG tablet Take 2 tablets (20 mg total) by mouth 3 (three) times daily. 7/13/22 8/9/22  Yolanda Barriga MD     Anticoagulants/Antiplatelets: reviewed    Allergies:   Review of patient's allergies indicates:   Allergen Reactions    Flecainide Shortness Of Breath and Swelling    Shellfish containing products Other (See Comments)     Makes gout terrible    Vancomycin analogues Hives, Itching and Rash     Sedation History:  no adverse reactions    Labs:  Lab Results   Component Value Date    INR 1.0 05/08/2025       Lab Results   Component Value Date    WBC 6.47 05/08/2025    HGB 11.6 (L) 05/08/2025    HCT 38.5 05/08/2025    MCV 86 05/08/2025     05/08/2025     Lab Results   Component Value Date     06/13/2025     06/13/2025    K 4.9 06/18/2025     06/13/2025    CO2 28 06/13/2025    BUN 31 (H) 06/13/2025    CREATININE 1.1 07/18/2025    CALCIUM 9.3 06/13/2025    MG 1.7 09/18/2024    ALT 6 (L) 06/13/2025    AST 16 06/13/2025    ALBUMIN  3.2 (L) 06/13/2025    BILITOT 0.5 06/13/2025    BILIDIR 0.8 (H) 08/02/2024       Review of Systems:   Hematological: no known coagulopathies  Respiratory: no shortness of breath  Cardiovascular: no chest pain  Gastrointestinal: no abdominal pain  Genito-Urinary: no dysuria  Musculoskeletal: negative  Neurological: no TIA or stroke symptoms         OBJECTIVE:     Vital Signs (Most Recent)  Pulse: 71 (07/18/25 0932)  Resp: 17 (07/18/25 0932)  BP: (!) 164/77 (07/18/25 0932)  SpO2: 100 % (07/18/25 0932)    Physical Exam:  General: no acute distress  Mental Status: alert and oriented to person, place and time  HEENT: normocephalic, atraumatic  Chest: unlabored breathing  Heart: regular heart rate  Abdomen: nondistended  Extremity: moves all extremities    ASSESSMENT/PLAN:   Risks and benefits of the procedure discussed with patient prior to obtaining consent. Will proceed with paracentesis.     Elvin Mendez MD  Diagnostic Radiology

## 2025-07-18 NOTE — PROCEDURES
Radiology Post-Procedure Note    Pre Op Diagnosis: Ascites  Post Op Diagnosis: Same    Procedure: Ultrasound Guided Paracentesis    Procedure performed by: Elvin Mendez MD    Written Informed Consent Obtained: Yes  Specimen Removed: YES   Estimated Blood Loss: Minimal    Findings:   Successful LLQ paracentesis.  Albumin administered PRN per protocol.    Patient tolerated procedure well.    Elvin Mendez MD  Diagnostic Radiology

## 2025-07-25 ENCOUNTER — HOSPITAL ENCOUNTER (OUTPATIENT)
Dept: INTERVENTIONAL RADIOLOGY/VASCULAR | Facility: HOSPITAL | Age: 60
Discharge: HOME | End: 2025-07-25
Attending: INTERNAL MEDICINE
Payer: MEDICAID

## 2025-07-25 VITALS
DIASTOLIC BLOOD PRESSURE: 68 MMHG | RESPIRATION RATE: 16 BRPM | HEART RATE: 54 BPM | OXYGEN SATURATION: 95 % | SYSTOLIC BLOOD PRESSURE: 118 MMHG

## 2025-07-25 DIAGNOSIS — K74.69 OTHER CIRRHOSIS OF LIVER: Chronic | ICD-10-CM

## 2025-07-25 PROCEDURE — C1729 CATH, DRAINAGE: HCPCS

## 2025-07-25 PROCEDURE — 63600175 PHARM REV CODE 636 W HCPCS: Performed by: FAMILY MEDICINE

## 2025-07-25 PROCEDURE — 49083 ABD PARACENTESIS W/IMAGING: CPT | Mod: ,,, | Performed by: FAMILY MEDICINE

## 2025-07-25 PROCEDURE — 49083 ABD PARACENTESIS W/IMAGING: CPT | Performed by: STUDENT IN AN ORGANIZED HEALTH CARE EDUCATION/TRAINING PROGRAM

## 2025-07-25 RX ORDER — LIDOCAINE HYDROCHLORIDE 10 MG/ML
INJECTION, SOLUTION INFILTRATION; PERINEURAL
Status: COMPLETED | OUTPATIENT
Start: 2025-07-25 | End: 2025-07-25

## 2025-07-25 RX ADMIN — LIDOCAINE HYDROCHLORIDE 10 ML: 10 INJECTION, SOLUTION INFILTRATION; PERINEURAL at 09:07

## 2025-07-25 NOTE — H&P
Radiology History & Physical      SUBJECTIVE:     Chief Complaint: abdominal distention    History of Present Illness:  Vicky Alvarado is a 60 y.o. female who presents for ultrasound guided paracentesis  Past Medical History:   Diagnosis Date    Anticoagulant long-term use     Arthritis     Atrial fibrillation     cardioversion    Atrial fibrillation with RVR     HAS WATCHMAN IN PLACE NOW    Avascular necrosis     L hand    Cellulitis of extremity 05/07/2022    CHF (congestive heart failure)     Chronic fatigue     Cirrhosis of liver with ascites     Depression     Diabetes mellitus     Encounter for blood transfusion 07/22/2020    Encounter for blood transfusion 03/2022    Fatty liver     GERD (gastroesophageal reflux disease)     Hx of psychiatric care     Hypertension     Iron deficiency anemia 05/07/2022    TIBC 444 with saturated iron 8    Left leg cellulitis 05/07/2022    Liver disease     Obese     Pre-diabetes 05/07/2022    Psychiatric problem     Sleep apnea     wears cpap    SOB (shortness of breath) on exertion     Weight loss     75lb intentional weight loss     Past Surgical History:   Procedure Laterality Date    ABLATION OF ARRHYTHMOGENIC FOCUS FOR ATRIAL FIBRILLATION N/A 07/20/2020    Procedure: Ablation atrial fibrillation;  Surgeon: Gabriel Hawley MD;  Location: [facility] EP LAB;  Service: Cardiology;  Laterality: N/A;  afib, PVI, RFA, REENA, SHADY, anes, MB, 3 Prep    ABLATION OF ARRHYTHMOGENIC FOCUS FOR ATRIAL FIBRILLATION N/A 01/24/2022    Procedure: Ablation atrial fibrillation;  Surgeon: Gabriel Hawley MD;  Location: [facility] EP LAB;  Service: Cardiology;  Laterality: N/A;  afib/afl, PVI (re-do)/CTI, RFA, REENA (cx if SR), SHADY, anes, MB, 3 Prep    APPLICATION OF WOUND VACUUM-ASSISTED CLOSURE DEVICE Left 08/03/2020    Procedure: APPLICATION, WOUND VAC;  Surgeon: SEMAJ Márquez III, MD;  Location: [facility] OR [address] [place] [address];  Service: Peripheral Vascular;  Laterality: Left;    APPLICATION OF WOUND  VACUUM-ASSISTED CLOSURE DEVICE Left 08/06/2020    Procedure: APPLICATION, WOUND VAC;  Surgeon: SEMAJ Márquez III, MD;  Location: [facility] OR 2ND FLR;  Service: Peripheral Vascular;  Laterality: Left;    CARPAL TUNNEL RELEASE Right 06/10/2020    Procedure: RELEASE, CARPAL TUNNEL - RIGHT;  Surgeon: Adelaida Hall MD;  Location: [facility] OR;  Service: Orthopedics;  Laterality: Right;  GENERAL AND REGIONAL    CARPAL TUNNEL RELEASE Left 05/05/2021    Procedure: RELEASE, CARPAL TUNNEL, LEFT;  Surgeon: Adelaida Hall MD;  Location: [facility] OR;  Service: Orthopedics;  Laterality: Left;  GENERAL & REGIONAL IN PACU    CARPECTOMY Left 09/06/2023    Procedure: CARPECTOMY;  Surgeon: Adelaida Hall MD;  Location: [facility] OR;  Service: Orthopedics;  Laterality: Left;  MAC/REGIONAL  LEFT PROXIMAL ROW    CLOSURE OF WOUND Left 08/06/2020    Procedure: CLOSURE, WOUND;  Surgeon: SEMAJ Márquez III, MD;  Location: [facility] OR 2ND FLR;  Service: Peripheral Vascular;  Laterality: Left;  Complex    COLONOSCOPY N/A 08/17/2021    Procedure: COLONOSCOPY Suprep;  Surgeon: Loco Deluca MD;  Location: [facility];  Service: Endoscopy;  Laterality: N/A;    ECHOCARDIOGRAM,TRANSESOPHAGEAL N/A 07/24/2023    Procedure: Transesophageal echo (REENA) intra-procedure log documentation;  Surgeon: Leyla Diagnostic Provider;  Location: [facility] EP LAB;  Service: Cardiology;  Laterality: N/A;  s/p WM, REENA, anes, 3 Prep    ESOPHAGOGASTRODUODENOSCOPY N/A 08/17/2021    Procedure: EGD (ESOPHAGOGASTRODUODENOSCOPY);  Surgeon: Loco Deluca MD;  Location: [facility] ENDO;  Service: Endoscopy;  Laterality: N/A;  Patient is schedule to have her Covid test on 08/14/2021 at the [facility] @ 9:30am. [place].    EXPLORATION OF FEMORAL ARTERY Left 07/21/2020    Procedure: EXPLORATION, ARTERY, FEMORAL;  Surgeon: SEMAJ Márquez III, MD;  Location: [facility] OR 2ND FLR;  Service: Peripheral Vascular;  Laterality: Left;  1. Urgent Direct repair, L SFA branch  laceration    FOOT SURGERY      HYSTEROSCOPY WITH DILATION AND CURETTAGE OF UTERUS N/A 02/19/2022    Procedure: HYSTEROSCOPY, WITH DILATION AND CURETTAGE OF UTERUS;  Surgeon: Shane Palomo MD;  Location: [address];  Service: OB/GYN;  Laterality: N/A;    INCISION AND DRAINAGE OF KNEE Left 05/12/2022    Procedure: INCISION AND DRAINAGE, Prepatellar bursectomy.;  Surgeon: Dre Guzmán MD;  [address];  Service: Orthopedics;  Laterality: Left;    LEFT HEART CATHETERIZATION Left 02/07/2023    Procedure: Left heart cath;  Surgeon: Josiah Terry MD;  Location: [facility] CATH LAB;  Service: Cardiology;  Laterality: Left;  borderline moderate bleeding risk 6.3%    OCCLUSION OF LEFT ATRIAL APPENDAGE N/A 06/12/2023    Procedure: Left atrial appendage occlusion;  Surgeon: Gabriel Hawley MD;  Location: [facility] EP LAB;  Service: Cardiology;  Laterality: N/A;  afib, watchman, BSCI, demi, ALIYA ibarra, 3prep    RELEASE OF ULNAR NERVE AT CUBITAL TUNNEL Bilateral     RIGHT HEART CATHETERIZATION Right 08/05/2024    Procedure: INSERTION, CATHETER, RIGHT HEART;  Surgeon: Zane Liu MD;  Location: [facility] CATH LAB;  Service: Cardiology;  Laterality: Right;    ROBOT-ASSISTED LAPAROSCOPIC ABDOMINAL HYSTERECTOMY USING DA KEIRY XI N/A 08/09/2022    Procedure: XI ROBOTIC HYSTERECTOMY;  Surgeon: Yolanda Barriga MD;  Location: [facility];  Service: OB/GYN;  Laterality: N/A;  dual console requested    ROBOT-ASSISTED LAPAROSCOPIC SALPINGO-OOPHORECTOMY Bilateral 08/09/2022    Procedure: ROBOTIC SALPINGO-OOPHORECTOMY;  Surgeon: Yolanda Barriga MD;  Location: [facility];  Service: OB/GYN;  Laterality: Bilateral;    TRANSESOPHAGEAL ECHOCARDIOGRAPHY N/A 06/12/2023    Procedure: ECHOCARDIOGRAM, TRANSESOPHAGEAL;  Surgeon: Cyril Mast MD;  Location: [facility] EP LAB;  Service: Cardiology;  Laterality: N/A;    TREATMENT OF CARDIAC ARRHYTHMIA N/A 01/29/2020    Procedure: CARDIOVERSION;  Surgeon: Gabriel Hawley MD;  Location:  [facility] EP LAB;  Service: Cardiology;  Laterality: N/A;  af, demi, dccv, anes, mb, 345    TREATMENT OF CARDIAC ARRHYTHMIA  01/24/2022    Procedure: Cardioversion or Defibrillation;  Surgeon: Gabriel Hawley MD;  Location: [facility] EP LAB;  Service: Cardiology;;    WOUND DEBRIDEMENT Left 08/06/2020    Procedure: DEBRIDEMENT, WOUND;  Surgeon: SEMAJ Márquez III, MD;  Location: [facility] OR [address] [place] [address];  Service: Peripheral Vascular;  Laterality: Left;       Home Meds:   Prior to Admission medications    Medication Sig Start Date End Date Taking? Authorizing Provider   albuterol (VENTOLIN HFA) 90 mcg/actuation inhaler Inhale 2 puffs into the lungs every 6 (six) hours as needed for Wheezing. Rescue 1/8/25 1/8/26  Gordy Cordero MD   ALPRAZolam (XANAX) 0.5 MG tablet Take 1 tablet (0.5 mg total) by mouth 2 (two) times daily as needed for Anxiety. 7/8/25   Gordy Cordero MD   atorvastatin (LIPITOR) 80 MG tablet Take 1 tablet (80 mg total) by mouth once daily. 5/21/25 5/21/26  Niesha Brumfield PA-C   b complex vitamins capsule Take 1 capsule by mouth every other day.    Provider, Historical   DULoxetine (CYMBALTA) 60 MG capsule Take 1 capsule (60 mg total) by mouth once daily. 8/12/24   Brennon Nunez MD   ferrous sulfate 325 (65 FE) MG EC tablet Take 1 tablet (325 mg total) by mouth every other day. 9/18/24   Eugene Woodward MD   furosemide (LASIX) 20 MG tablet Take 1 tablet (20 mg total) by mouth once daily. 2/5/25 2/5/26  Gordy Cordero MD   ibuprofen (ADVIL,MOTRIN) 600 MG tablet Take 1 tablet (600 mg total) by mouth 2 (two) times daily as needed for Pain. 2/4/25   Gordy Cordero MD   lactulose (CONSTULOSE) 10 gram/15 mL solution Take 30 mLs (20 g total) by mouth 3 (three) times daily. Please ensure you are having at least 2-3 bowel movements every day. 7/9/25   Kiko Saba MD   metoprolol succinate (TOPROL-XL) 100 MG 24 hr tablet Take 1 tablet (100 mg total) by mouth 2 (two) times daily. 2/5/25 2/5/26  Dale  Marah HAIR NP   multivitamin (THERAGRAN) per tablet Take 1 tablet by mouth once daily.    Provider, Historical   nystatin (MYCOSTATIN) powder Apply topically 2 (two) times daily. 5/7/25   Gordy Cordero MD   omeprazole (PRILOSEC) 20 MG capsule TAKE 1 CAPSULE BY MOUTH ONCE DAILY 7/8/25   Gordy Cordero MD   ondansetron (ZOFRAN-ODT) 4 MG TbDL Take 2 tablets (8 mg total) by mouth every 8 (eight) hours as needed (nasuea, vomiting). 5/7/25   Gordy Cordero MD   oxyCODONE (ROXICODONE) 5 MG immediate release tablet Take 1 tablet (5 mg total) by mouth every 12 (twelve) hours as needed for Pain. 7/8/25   Gordy Cordero MD   spironolactone (ALDACTONE) 50 MG tablet Take 1 tablet (50 mg total) by mouth once daily. 6/4/25 12/31/25  Kiko Saba MD   traZODone (DESYREL) 100 MG tablet Take 100 mg by mouth every evening.    Provider, Historical   traZODone (DESYREL) 100 MG tablet Take 1 tablet (100 mg total) by mouth nightly as needed for Insomnia. 3/9/25   Gordy Cordero MD   medroxyPROGESTERone (PROVERA) 10 MG tablet Take 2 tablets (20 mg total) by mouth 3 (three) times daily. 7/13/22 8/9/22  Yolanda Barriga MD     Anticoagulants/Antiplatelets: no anticoagulation    Allergies:   Review of patient's allergies indicates:   Allergen Reactions    Flecainide Shortness Of Breath and Swelling    Shellfish containing products Other (See Comments)     Makes gout terrible    Vancomycin analogues Hives, Itching and Rash     Sedation History:  no adverse reactions    Review of Systems:   Hematological: no known coagulopathies  Respiratory: no shortness of breath  Cardiovascular: no chest pain  Gastrointestinal: no abdominal pain  Genito-Urinary: no dysuria  Musculoskeletal: negative  Neurological: no TIA or stroke symptoms         OBJECTIVE:     Vital Signs (Most Recent)       Physical Exam:  ASA: 2  Mallampati: n/a    General: no acute distress  Mental Status: alert and oriented to person, place and time  HEENT:  normocephalic, atraumatic  Chest: unlabored breathing  Heart: regular heart rate  Abdomen: distended  Extremity: moves all extremities    ASSESSMENT/PLAN:     Sedation Plan: local  Patient will undergo ultrasound guided paracentesis.    SCOUT Wolfe, FNP  Interventional Radiology  [phone] [facility]

## 2025-07-25 NOTE — PLAN OF CARE
Paracentesis completed, pt tolerated well. No apparent distress noted. 3200 ml removed from LLQ, mepore applied CDI. No albumin  given per protocol.  Discharge instructions reviewed and acknowledged by patient. Pt discharged via walker, steady gait observed.

## 2025-07-25 NOTE — PROCEDURES
Radiology Post-Procedure Note    Pre Op Diagnosis: Ascites  Post Op Diagnosis: Same    Procedure: Ultrasound Guided Paracentesis    Procedure performed by: Jannet CUNNINGHAM, Samantha     Written Informed Consent Obtained: Yes  Specimen Removed: YES serous  Estimated Blood Loss: Minimal    Findings:   Successful LLQ paracentesis.  Albumin administered PRN per protocol.    Patient tolerated procedure well.    Samantha Power, APRN, FNP  Interventional Radiology  [phone] clinic

## 2025-07-25 NOTE — DISCHARGE INSTRUCTIONS
Discharge Instructions    Follow-up Care:  Follow-up care is an important part of your treatment and safety. Be sure to go to follow up appointments , and follow up with your primary care provider/ physician that sent you to IR.      Diet and Medication:  1. Unless specified on your personalized discharge instructions, continue previous medications and/or diet recommendations.   2. For questions about this please contact your primary care provider or the provider who ordered your drain.     Activity:   If you have an open wound do not submerge that area.    If you have additional questions ask your provider.     If you have any of the following symptoms, call the Interventional Radiology Clinic    Increased pain at the procedure site   Increased or more short of breath    Continuous vomiting    Back or abdominal pain - this may indicate an infection    Fever (greater than 100.6 degrees F) or chills    Foul smelling drainage or abnormal bleeding from the procedure site    Interventional Radiology Clinic   For complications   [phone]. Monday - Friday, 8:00 am - 4:00 pm    [phone] After hours and on holidays. Ask to speak with the interventional radiologist on call.     Call 911 if you have any of the following signs and symptoms, or if you think you need emergency care.    Shortness of breath    Chest pain    Passing out, fainting (loss of consciousness)   Uncontrolled bleeding   Confusion    For Scheduling   [phone] Monday - Friday, 8:00 am - 4:00 pm

## 2025-08-01 ENCOUNTER — HOSPITAL ENCOUNTER (OUTPATIENT)
Dept: INTERVENTIONAL RADIOLOGY/VASCULAR | Facility: HOSPITAL | Age: 60
Discharge: HOME | End: 2025-08-01
Attending: INTERNAL MEDICINE
Payer: MEDICAID

## 2025-08-01 VITALS
RESPIRATION RATE: 16 BRPM | SYSTOLIC BLOOD PRESSURE: 167 MMHG | OXYGEN SATURATION: 100 % | DIASTOLIC BLOOD PRESSURE: 67 MMHG | HEART RATE: 55 BPM

## 2025-08-01 DIAGNOSIS — R18.8 CIRRHOSIS OF LIVER WITH ASCITES, UNSPECIFIED HEPATIC CIRRHOSIS TYPE: Chronic | ICD-10-CM

## 2025-08-01 DIAGNOSIS — K74.60 CIRRHOSIS OF LIVER WITH ASCITES, UNSPECIFIED HEPATIC CIRRHOSIS TYPE: Chronic | ICD-10-CM

## 2025-08-01 PROCEDURE — C1729 CATH, DRAINAGE: HCPCS

## 2025-08-01 PROCEDURE — 63600175 PHARM REV CODE 636 W HCPCS: Performed by: FAMILY MEDICINE

## 2025-08-01 RX ORDER — LIDOCAINE HYDROCHLORIDE 10 MG/ML
INJECTION, SOLUTION INFILTRATION; PERINEURAL
Status: COMPLETED | OUTPATIENT
Start: 2025-08-01 | End: 2025-08-01

## 2025-08-01 RX ADMIN — LIDOCAINE HYDROCHLORIDE 10 ML: 10 INJECTION, SOLUTION INFILTRATION; PERINEURAL at 08:08

## 2025-08-01 NOTE — H&P
Radiology History & Physical      SUBJECTIVE:     Chief Complaint: abdominal distention    History of Present Illness:  Vicky Alvarado is a 60 y.o. female who presents for ultrasound guided paracentesis  Past Medical History:   Diagnosis Date    Anticoagulant long-term use     Arthritis     Atrial fibrillation     cardioversion    Atrial fibrillation with RVR     HAS WATCHMAN IN PLACE NOW    Avascular necrosis     L hand    Cellulitis of extremity 05/07/2022    CHF (congestive heart failure)     Chronic fatigue     Cirrhosis of liver with ascites     Depression     Diabetes mellitus     Encounter for blood transfusion 07/22/2020    Encounter for blood transfusion 03/2022    Fatty liver     GERD (gastroesophageal reflux disease)     Hx of psychiatric care     Hypertension     Iron deficiency anemia 05/07/2022    TIBC 444 with saturated iron 8    Left leg cellulitis 05/07/2022    Liver disease     Obese     Pre-diabetes 05/07/2022    Psychiatric problem     Sleep apnea     wears cpap    SOB (shortness of breath) on exertion     Weight loss     75lb intentional weight loss     Past Surgical History:   Procedure Laterality Date    ABLATION OF ARRHYTHMOGENIC FOCUS FOR ATRIAL FIBRILLATION N/A 07/20/2020    Procedure: Ablation atrial fibrillation;  Surgeon: Gabriel Hawley MD;  Location: [facility] EP LAB;  Service: Cardiology;  Laterality: N/A;  afib, PVI, RFA, REENA, SHADY, anes, MB, 3 Prep    ABLATION OF ARRHYTHMOGENIC FOCUS FOR ATRIAL FIBRILLATION N/A 01/24/2022    Procedure: Ablation atrial fibrillation;  Surgeon: Gabriel Hawley MD;  Location: [facility] EP LAB;  Service: Cardiology;  Laterality: N/A;  afib/afl, PVI (re-do)/CTI, RFA, REENA (cx if SR), SHADY, anes, MB, 3 Prep    APPLICATION OF WOUND VACUUM-ASSISTED CLOSURE DEVICE Left 08/03/2020    Procedure: APPLICATION, WOUND VAC;  Surgeon: SEMAJ Márquez III, MD;  Location: [facility] OR [address] [place] [address];  Service: Peripheral Vascular;  Laterality: Left;    APPLICATION OF WOUND  VACUUM-ASSISTED CLOSURE DEVICE Left 08/06/2020    Procedure: APPLICATION, WOUND VAC;  Surgeon: SEMAJ Márquez III, MD;  Location: [facility] OR 2ND FLR;  Service: Peripheral Vascular;  Laterality: Left;    CARPAL TUNNEL RELEASE Right 06/10/2020    Procedure: RELEASE, CARPAL TUNNEL - RIGHT;  Surgeon: Adelaida Hall MD;  Location: [facility] OR;  Service: Orthopedics;  Laterality: Right;  GENERAL AND REGIONAL    CARPAL TUNNEL RELEASE Left 05/05/2021    Procedure: RELEASE, CARPAL TUNNEL, LEFT;  Surgeon: Adelaida Hall MD;  Location: [facility] OR;  Service: Orthopedics;  Laterality: Left;  GENERAL & REGIONAL IN PACU    CARPECTOMY Left 09/06/2023    Procedure: CARPECTOMY;  Surgeon: Adelaida Hall MD;  Location: [facility] OR;  Service: Orthopedics;  Laterality: Left;  MAC/REGIONAL  LEFT PROXIMAL ROW    CLOSURE OF WOUND Left 08/06/2020    Procedure: CLOSURE, WOUND;  Surgeon: SEMAJ Márquez III, MD;  Location: [facility] OR 2ND FLR;  Service: Peripheral Vascular;  Laterality: Left;  Complex    COLONOSCOPY N/A 08/17/2021    Procedure: COLONOSCOPY Suprep;  Surgeon: Loco Deluca MD;  Location: [facility];  Service: Endoscopy;  Laterality: N/A;    ECHOCARDIOGRAM,TRANSESOPHAGEAL N/A 07/24/2023    Procedure: Transesophageal echo (REENA) intra-procedure log documentation;  Surgeon: Leyla Diagnostic Provider;  Location: [facility] EP LAB;  Service: Cardiology;  Laterality: N/A;  s/p WM, REENA, anes, 3 Prep    ESOPHAGOGASTRODUODENOSCOPY N/A 08/17/2021    Procedure: EGD (ESOPHAGOGASTRODUODENOSCOPY);  Surgeon: Loco Deluca MD;  Location: [facility] ENDO;  Service: Endoscopy;  Laterality: N/A;  Patient is schedule to have her Covid test on 08/14/2021 at the [facility] @ 9:30am. [place].    EXPLORATION OF FEMORAL ARTERY Left 07/21/2020    Procedure: EXPLORATION, ARTERY, FEMORAL;  Surgeon: SEMAJ Márquez III, MD;  Location: [facility] OR 2ND FLR;  Service: Peripheral Vascular;  Laterality: Left;  1. Urgent Direct repair, L SFA branch  laceration    FOOT SURGERY      HYSTEROSCOPY WITH DILATION AND CURETTAGE OF UTERUS N/A 02/19/2022    Procedure: HYSTEROSCOPY, WITH DILATION AND CURETTAGE OF UTERUS;  Surgeon: Shane Palomo MD;  Location: [address];  Service: OB/GYN;  Laterality: N/A;    INCISION AND DRAINAGE OF KNEE Left 05/12/2022    Procedure: INCISION AND DRAINAGE, Prepatellar bursectomy.;  Surgeon: Dre Guzmán MD;  [address];  Service: Orthopedics;  Laterality: Left;    LEFT HEART CATHETERIZATION Left 02/07/2023    Procedure: Left heart cath;  Surgeon: Josiah Terry MD;  Location: [facility] CATH LAB;  Service: Cardiology;  Laterality: Left;  borderline moderate bleeding risk 6.3%    OCCLUSION OF LEFT ATRIAL APPENDAGE N/A 06/12/2023    Procedure: Left atrial appendage occlusion;  Surgeon: Gabriel Hawley MD;  Location: [facility] EP LAB;  Service: Cardiology;  Laterality: N/A;  afib, watchman, BSCI, demi, ALIYA ibarra, 3prep    RELEASE OF ULNAR NERVE AT CUBITAL TUNNEL Bilateral     RIGHT HEART CATHETERIZATION Right 08/05/2024    Procedure: INSERTION, CATHETER, RIGHT HEART;  Surgeon: Zane Liu MD;  Location: [facility] CATH LAB;  Service: Cardiology;  Laterality: Right;    ROBOT-ASSISTED LAPAROSCOPIC ABDOMINAL HYSTERECTOMY USING DA KEIRY XI N/A 08/09/2022    Procedure: XI ROBOTIC HYSTERECTOMY;  Surgeon: Yolanda Barriga MD;  Location: [facility];  Service: OB/GYN;  Laterality: N/A;  dual console requested    ROBOT-ASSISTED LAPAROSCOPIC SALPINGO-OOPHORECTOMY Bilateral 08/09/2022    Procedure: ROBOTIC SALPINGO-OOPHORECTOMY;  Surgeon: Yolanda Barriga MD;  Location: [facility];  Service: OB/GYN;  Laterality: Bilateral;    TRANSESOPHAGEAL ECHOCARDIOGRAPHY N/A 06/12/2023    Procedure: ECHOCARDIOGRAM, TRANSESOPHAGEAL;  Surgeon: Cyril Mast MD;  Location: [facility] EP LAB;  Service: Cardiology;  Laterality: N/A;    TREATMENT OF CARDIAC ARRHYTHMIA N/A 01/29/2020    Procedure: CARDIOVERSION;  Surgeon: Gabriel Hawley MD;  Location:  [facility] EP LAB;  Service: Cardiology;  Laterality: N/A;  af, demi, dccv, anes, mb, 345    TREATMENT OF CARDIAC ARRHYTHMIA  01/24/2022    Procedure: Cardioversion or Defibrillation;  Surgeon: Gabriel Hawley MD;  Location: [facility] EP LAB;  Service: Cardiology;;    WOUND DEBRIDEMENT Left 08/06/2020    Procedure: DEBRIDEMENT, WOUND;  Surgeon: SEMAJ Márquez III, MD;  Location: [facility] OR [address] [place] [address];  Service: Peripheral Vascular;  Laterality: Left;       Home Meds:   Prior to Admission medications    Medication Sig Start Date End Date Taking? Authorizing Provider   albuterol (VENTOLIN HFA) 90 mcg/actuation inhaler Inhale 2 puffs into the lungs every 6 (six) hours as needed for Wheezing. Rescue 1/8/25 1/8/26  Gordy Cordero MD   ALPRAZolam (XANAX) 0.5 MG tablet Take 1 tablet (0.5 mg total) by mouth 2 (two) times daily as needed for Anxiety. 7/8/25   Gordy Cordero MD   atorvastatin (LIPITOR) 80 MG tablet Take 1 tablet (80 mg total) by mouth once daily. 5/21/25 5/21/26  Niesha Brumfield PA-C   b complex vitamins capsule Take 1 capsule by mouth every other day.    Provider, Historical   DULoxetine (CYMBALTA) 60 MG capsule Take 1 capsule (60 mg total) by mouth once daily. 8/12/24   Brennon Nunez MD   ferrous sulfate 325 (65 FE) MG EC tablet Take 1 tablet (325 mg total) by mouth every other day. 9/18/24   Eugene Woodward MD   furosemide (LASIX) 20 MG tablet Take 1 tablet (20 mg total) by mouth once daily. 2/5/25 2/5/26  Gordy Cordero MD   ibuprofen (ADVIL,MOTRIN) 600 MG tablet Take 1 tablet (600 mg total) by mouth 2 (two) times daily as needed for Pain. 2/4/25   Gordy Cordero MD   lactulose (CONSTULOSE) 10 gram/15 mL solution Take 30 mLs (20 g total) by mouth 3 (three) times daily. Please ensure you are having at least 2-3 bowel movements every day. 7/9/25   Kiko Saba MD   metoprolol succinate (TOPROL-XL) 100 MG 24 hr tablet Take 1 tablet (100 mg total) by mouth 2 (two) times daily. 2/5/25 2/5/26  Dale  Marah HAIR NP   multivitamin (THERAGRAN) per tablet Take 1 tablet by mouth once daily.    Provider, Historical   nystatin (MYCOSTATIN) powder Apply topically 2 (two) times daily. 5/7/25   Gordy Cordero MD   omeprazole (PRILOSEC) 20 MG capsule TAKE 1 CAPSULE BY MOUTH ONCE DAILY 7/8/25   Gordy Cordero MD   ondansetron (ZOFRAN-ODT) 4 MG TbDL Take 2 tablets (8 mg total) by mouth every 8 (eight) hours as needed (nasuea, vomiting). 5/7/25   Gordy Cordero MD   oxyCODONE (ROXICODONE) 5 MG immediate release tablet Take 1 tablet (5 mg total) by mouth every 12 (twelve) hours as needed for Pain. 7/8/25   Gordy Cordero MD   spironolactone (ALDACTONE) 50 MG tablet Take 1 tablet (50 mg total) by mouth once daily. 6/4/25 12/31/25  Kiko Saba MD   traZODone (DESYREL) 100 MG tablet Take 100 mg by mouth every evening.    Provider, Historical   traZODone (DESYREL) 100 MG tablet Take 1 tablet (100 mg total) by mouth nightly as needed for Insomnia. 3/9/25   Gordy Cordero MD   medroxyPROGESTERone (PROVERA) 10 MG tablet Take 2 tablets (20 mg total) by mouth 3 (three) times daily. 7/13/22 8/9/22  Yolanda Barriga MD     Anticoagulants/Antiplatelets: no anticoagulation    Allergies:   Review of patient's allergies indicates:   Allergen Reactions    Flecainide Shortness Of Breath and Swelling    Shellfish containing products Other (See Comments)     Makes gout terrible    Vancomycin analogues Hives, Itching and Rash     Sedation History:  no adverse reactions    Review of Systems:   Hematological: no known coagulopathies  Respiratory: no shortness of breath  Cardiovascular: no chest pain  Gastrointestinal: no abdominal pain  Genito-Urinary: no dysuria  Musculoskeletal: negative  Neurological: no TIA or stroke symptoms         OBJECTIVE:     Vital Signs (Most Recent)  Pulse: (!) 55 (08/01/25 0808)  Resp: 16 (08/01/25 0808)  BP: (!) 167/67 (08/01/25 0808)  SpO2: 100 % (08/01/25 0808)    Physical Exam:  ASA:  2  Mallampati: n/a    General: no acute distress  Mental Status: alert and oriented to person, place and time  HEENT: normocephalic, atraumatic  Chest: unlabored breathing  Heart: regular heart rate  Abdomen: distended  Extremity: moves all extremities    ASSESSMENT/PLAN:     Sedation Plan: local  Patient will undergo ultrasound guided paracentesis.    SCOUT Wolfe, FNP  Interventional Radiology  [phone] clinic

## 2025-08-01 NOTE — SEDATION DOCUMENTATION
Paracentesis complete. 3600 mLs peritoneal fluid drained. Pt tolerated well. Dressing to left abd clean, dry, and intact. Pt stattes full understanding of discharge and ambulated back to lobby.

## 2025-08-01 NOTE — DISCHARGE INSTRUCTIONS
Interventional Radiology Clinic   For complications   [phone]. Monday - Friday, 8:00 am - 4:00 pm    [phone] After hours and on holidays. Ask to speak with the interventional radiologist on call.     For Scheduling   [phone] Monday - Friday, 8:00 am - 4:00 pm

## 2025-08-01 NOTE — Clinical Note
Left: Abdomen.   Scrubbed with ChloroPrep With Tint.    Hair: N/A.  Skin prep dry before draping.  Prepped by: Jae Roberts MD 8/1/2025 8:23 AM.

## 2025-08-01 NOTE — PROCEDURES
Radiology Post-Procedure Note    Pre Op Diagnosis: Ascites  Post Op Diagnosis: Same    Procedure: Ultrasound Guided Paracentesis    Procedure performed by: Jannet CUNNINGHAM, Samantha     Written Informed Consent Obtained: Yes  Specimen Removed: YES serous  Estimated Blood Loss: Minimal    Findings:   Successful LLQ paracentesis.  Albumin administered PRN per protocol.    Patient tolerated procedure well.    Samantha Power, APRN, FNP  Interventional Radiology  [phone] clinic

## 2025-08-08 ENCOUNTER — HOSPITAL ENCOUNTER (OUTPATIENT)
Dept: INTERVENTIONAL RADIOLOGY/VASCULAR | Facility: HOSPITAL | Age: 60
Discharge: HOME | End: 2025-08-08
Attending: INTERNAL MEDICINE
Payer: MEDICAID

## 2025-08-08 VITALS
OXYGEN SATURATION: 99 % | SYSTOLIC BLOOD PRESSURE: 131 MMHG | HEART RATE: 62 BPM | DIASTOLIC BLOOD PRESSURE: 78 MMHG | RESPIRATION RATE: 19 BRPM

## 2025-08-08 DIAGNOSIS — K74.60 CIRRHOSIS OF LIVER WITH ASCITES, UNSPECIFIED HEPATIC CIRRHOSIS TYPE: Chronic | ICD-10-CM

## 2025-08-08 DIAGNOSIS — R18.8 CIRRHOSIS OF LIVER WITH ASCITES, UNSPECIFIED HEPATIC CIRRHOSIS TYPE: Chronic | ICD-10-CM

## 2025-08-08 PROCEDURE — C1894 INTRO/SHEATH, NON-LASER: HCPCS

## 2025-08-08 PROCEDURE — 63600175 PHARM REV CODE 636 W HCPCS: Performed by: FAMILY MEDICINE

## 2025-08-08 RX ORDER — LIDOCAINE HYDROCHLORIDE 10 MG/ML
INJECTION, SOLUTION EPIDURAL; INFILTRATION; INTRACAUDAL; PERINEURAL
Status: COMPLETED | OUTPATIENT
Start: 2025-08-08 | End: 2025-08-08

## 2025-08-08 RX ADMIN — LIDOCAINE HYDROCHLORIDE 5 ML: 10 SOLUTION INTRAVENOUS at 08:08

## 2025-08-08 NOTE — PLAN OF CARE
Patient AAOx3, no distress noted, respirations even and unlabored, VSS. Acceptance of education, consents signed, H/P done. Labs reviewed. Patient in MPU Room 1 for paracenetesis procedure. Warm blankets applied to patient. Patient prepped and draped in sterile fashion.

## 2025-08-08 NOTE — H&P
Radiology History & Physical      SUBJECTIVE:     Chief Complaint: abdominal distention    History of Present Illness:  Vicky Alvarado is a 60 y.o. female who presents for ultrasound guided paracentesis  Past Medical History:   Diagnosis Date    Anticoagulant long-term use     Arthritis     Atrial fibrillation     cardioversion    Atrial fibrillation with RVR     HAS WATCHMAN IN PLACE NOW    Avascular necrosis     L hand    Cellulitis of extremity 05/07/2022    CHF (congestive heart failure)     Chronic fatigue     Cirrhosis of liver with ascites     Depression     Diabetes mellitus     Encounter for blood transfusion 07/22/2020    Encounter for blood transfusion 03/2022    Fatty liver     GERD (gastroesophageal reflux disease)     Hx of psychiatric care     Hypertension     Iron deficiency anemia 05/07/2022    TIBC 444 with saturated iron 8    Left leg cellulitis 05/07/2022    Liver disease     Obese     Pre-diabetes 05/07/2022    Psychiatric problem     Sleep apnea     wears cpap    SOB (shortness of breath) on exertion     Weight loss     75lb intentional weight loss     Past Surgical History:   Procedure Laterality Date    ABLATION OF ARRHYTHMOGENIC FOCUS FOR ATRIAL FIBRILLATION N/A 07/20/2020    Procedure: Ablation atrial fibrillation;  Surgeon: Gabriel Hawley MD;  Location: [facility] EP LAB;  Service: Cardiology;  Laterality: N/A;  afib, PVI, RFA, REENA, SHADY, anes, MB, 3 Prep    ABLATION OF ARRHYTHMOGENIC FOCUS FOR ATRIAL FIBRILLATION N/A 01/24/2022    Procedure: Ablation atrial fibrillation;  Surgeon: Gabriel Hawley MD;  Location: [facility] EP LAB;  Service: Cardiology;  Laterality: N/A;  afib/afl, PVI (re-do)/CTI, RFA, REENA (cx if SR), SHADY, anes, MB, 3 Prep    APPLICATION OF WOUND VACUUM-ASSISTED CLOSURE DEVICE Left 08/03/2020    Procedure: APPLICATION, WOUND VAC;  Surgeon: SEMAJ Márquez III, MD;  Location: [facility] OR [address] [place] [address];  Service: Peripheral Vascular;  Laterality: Left;    APPLICATION OF WOUND  VACUUM-ASSISTED CLOSURE DEVICE Left 08/06/2020    Procedure: APPLICATION, WOUND VAC;  Surgeon: SEMAJ Márquez III, MD;  Location: [facility] OR 2ND FLR;  Service: Peripheral Vascular;  Laterality: Left;    CARPAL TUNNEL RELEASE Right 06/10/2020    Procedure: RELEASE, CARPAL TUNNEL - RIGHT;  Surgeon: Adelaida Hall MD;  Location: [facility] OR;  Service: Orthopedics;  Laterality: Right;  GENERAL AND REGIONAL    CARPAL TUNNEL RELEASE Left 05/05/2021    Procedure: RELEASE, CARPAL TUNNEL, LEFT;  Surgeon: Adelaida Hall MD;  Location: [facility] OR;  Service: Orthopedics;  Laterality: Left;  GENERAL & REGIONAL IN PACU    CARPECTOMY Left 09/06/2023    Procedure: CARPECTOMY;  Surgeon: Adelaida Hall MD;  Location: [facility] OR;  Service: Orthopedics;  Laterality: Left;  MAC/REGIONAL  LEFT PROXIMAL ROW    CLOSURE OF WOUND Left 08/06/2020    Procedure: CLOSURE, WOUND;  Surgeon: SEMAJ Márquez III, MD;  Location: [facility] OR 2ND FLR;  Service: Peripheral Vascular;  Laterality: Left;  Complex    COLONOSCOPY N/A 08/17/2021    Procedure: COLONOSCOPY Suprep;  Surgeon: Loco Deluca MD;  Location: [facility];  Service: Endoscopy;  Laterality: N/A;    ECHOCARDIOGRAM,TRANSESOPHAGEAL N/A 07/24/2023    Procedure: Transesophageal echo (REENA) intra-procedure log documentation;  Surgeon: Leyla Diagnostic Provider;  Location: [facility] EP LAB;  Service: Cardiology;  Laterality: N/A;  s/p WM, REENA, anes, 3 Prep    ESOPHAGOGASTRODUODENOSCOPY N/A 08/17/2021    Procedure: EGD (ESOPHAGOGASTRODUODENOSCOPY);  Surgeon: Loco Deluca MD;  Location: [facility] ENDO;  Service: Endoscopy;  Laterality: N/A;  Patient is schedule to have her Covid test on 08/14/2021 at the [facility] @ 9:30am. [place].    EXPLORATION OF FEMORAL ARTERY Left 07/21/2020    Procedure: EXPLORATION, ARTERY, FEMORAL;  Surgeon: SEMAJ Márquez III, MD;  Location: [facility] OR 2ND FLR;  Service: Peripheral Vascular;  Laterality: Left;  1. Urgent Direct repair, L SFA branch  laceration    FOOT SURGERY      HYSTEROSCOPY WITH DILATION AND CURETTAGE OF UTERUS N/A 02/19/2022    Procedure: HYSTEROSCOPY, WITH DILATION AND CURETTAGE OF UTERUS;  Surgeon: Shane Palomo MD;  Location: [address];  Service: OB/GYN;  Laterality: N/A;    INCISION AND DRAINAGE OF KNEE Left 05/12/2022    Procedure: INCISION AND DRAINAGE, Prepatellar bursectomy.;  Surgeon: Dre Guzmán MD;  [address];  Service: Orthopedics;  Laterality: Left;    LEFT HEART CATHETERIZATION Left 02/07/2023    Procedure: Left heart cath;  Surgeon: Josiah Terry MD;  Location: [facility] CATH LAB;  Service: Cardiology;  Laterality: Left;  borderline moderate bleeding risk 6.3%    OCCLUSION OF LEFT ATRIAL APPENDAGE N/A 06/12/2023    Procedure: Left atrial appendage occlusion;  Surgeon: Gabriel Hawley MD;  Location: [facility] EP LAB;  Service: Cardiology;  Laterality: N/A;  afib, watchman, BSCI, demi, ALIYA ibarra, 3prep    RELEASE OF ULNAR NERVE AT CUBITAL TUNNEL Bilateral     RIGHT HEART CATHETERIZATION Right 08/05/2024    Procedure: INSERTION, CATHETER, RIGHT HEART;  Surgeon: Zane Liu MD;  Location: [facility] CATH LAB;  Service: Cardiology;  Laterality: Right;    ROBOT-ASSISTED LAPAROSCOPIC ABDOMINAL HYSTERECTOMY USING DA KEIRY XI N/A 08/09/2022    Procedure: XI ROBOTIC HYSTERECTOMY;  Surgeon: Yolanda Barriga MD;  Location: [facility];  Service: OB/GYN;  Laterality: N/A;  dual console requested    ROBOT-ASSISTED LAPAROSCOPIC SALPINGO-OOPHORECTOMY Bilateral 08/09/2022    Procedure: ROBOTIC SALPINGO-OOPHORECTOMY;  Surgeon: Yolanda Barriga MD;  Location: [facility];  Service: OB/GYN;  Laterality: Bilateral;    TRANSESOPHAGEAL ECHOCARDIOGRAPHY N/A 06/12/2023    Procedure: ECHOCARDIOGRAM, TRANSESOPHAGEAL;  Surgeon: Cyril Mast MD;  Location: [facility] EP LAB;  Service: Cardiology;  Laterality: N/A;    TREATMENT OF CARDIAC ARRHYTHMIA N/A 01/29/2020    Procedure: CARDIOVERSION;  Surgeon: Gabriel Hawley MD;  Location:  [facility] EP LAB;  Service: Cardiology;  Laterality: N/A;  af, demi, dccv, anes, mb, 345    TREATMENT OF CARDIAC ARRHYTHMIA  01/24/2022    Procedure: Cardioversion or Defibrillation;  Surgeon: Gabriel aHwley MD;  Location: [facility] EP LAB;  Service: Cardiology;;    WOUND DEBRIDEMENT Left 08/06/2020    Procedure: DEBRIDEMENT, WOUND;  Surgeon: SEMAJ Márquez III, MD;  Location: [facility] OR [address] [place] [address];  Service: Peripheral Vascular;  Laterality: Left;       Home Meds:   Prior to Admission medications    Medication Sig Start Date End Date Taking? Authorizing Provider   albuterol (VENTOLIN HFA) 90 mcg/actuation inhaler Inhale 2 puffs into the lungs every 6 (six) hours as needed for Wheezing. Rescue 1/8/25 1/8/26  Gordy Cordero MD   ALPRAZolam (XANAX) 0.5 MG tablet Take 1 tablet (0.5 mg total) by mouth 2 (two) times daily as needed for Anxiety. 7/8/25   Gordy Cordero MD   atorvastatin (LIPITOR) 80 MG tablet Take 1 tablet (80 mg total) by mouth once daily. 5/21/25 5/21/26  Niesha Brumfield PA-C   b complex vitamins capsule Take 1 capsule by mouth every other day.    Provider, Historical   DULoxetine (CYMBALTA) 60 MG capsule Take 1 capsule (60 mg total) by mouth once daily. 8/12/24   Brennon Nunez MD   ferrous sulfate 325 (65 FE) MG EC tablet Take 1 tablet (325 mg total) by mouth every other day. 9/18/24   Eugene Woodward MD   furosemide (LASIX) 20 MG tablet Take 1 tablet (20 mg total) by mouth once daily. 2/5/25 2/5/26  Gordy Cordero MD   ibuprofen (ADVIL,MOTRIN) 600 MG tablet Take 1 tablet (600 mg total) by mouth 2 (two) times daily as needed for Pain. 2/4/25   Gordy Cordero MD   lactulose (CONSTULOSE) 10 gram/15 mL solution Take 30 mLs (20 g total) by mouth 3 (three) times daily. Please ensure you are having at least 2-3 bowel movements every day. 7/9/25   Kiko Saba MD   metoprolol succinate (TOPROL-XL) 100 MG 24 hr tablet Take 1 tablet (100 mg total) by mouth 2 (two) times daily. 2/5/25 2/5/26  Dale  Marah HAIR NP   multivitamin (THERAGRAN) per tablet Take 1 tablet by mouth once daily.    Provider, Historical   nystatin (MYCOSTATIN) powder Apply topically 2 (two) times daily. 5/7/25   Gordy Cordero MD   omeprazole (PRILOSEC) 20 MG capsule TAKE 1 CAPSULE BY MOUTH ONCE DAILY 7/8/25   Gordy Cordero MD   ondansetron (ZOFRAN-ODT) 4 MG TbDL Take 2 tablets (8 mg total) by mouth every 8 (eight) hours as needed (nasuea, vomiting). 8/6/25   Gordy Cordero MD   oxyCODONE (ROXICODONE) 5 MG immediate release tablet Take 1 tablet (5 mg total) by mouth every 12 (twelve) hours as needed for Pain. 8/6/25   Gordy Cordero MD   spironolactone (ALDACTONE) 50 MG tablet Take 1 tablet (50 mg total) by mouth once daily. 6/4/25 12/31/25  Kiko Saba MD   traZODone (DESYREL) 100 MG tablet Take 100 mg by mouth every evening.    Provider, Historical   traZODone (DESYREL) 100 MG tablet Take 1 tablet (100 mg total) by mouth nightly as needed for Insomnia. 3/9/25   Gordy Cordero MD   medroxyPROGESTERone (PROVERA) 10 MG tablet Take 2 tablets (20 mg total) by mouth 3 (three) times daily. 7/13/22 8/9/22  Yolanda Barriga MD     Anticoagulants/Antiplatelets: no anticoagulation    Allergies:   Review of patient's allergies indicates:   Allergen Reactions    Flecainide Shortness Of Breath and Swelling    Shellfish containing products Other (See Comments)     Makes gout terrible    Vancomycin analogues Hives, Itching and Rash     Sedation History:  no adverse reactions    Review of Systems:   Hematological: no known coagulopathies  Respiratory: no shortness of breath  Cardiovascular: no chest pain  Gastrointestinal: no abdominal pain  Genito-Urinary: no dysuria  Musculoskeletal: negative  Neurological: no TIA or stroke symptoms         OBJECTIVE:     Vital Signs (Most Recent)  Pulse: (!) 54 (08/08/25 0814)  Resp: 19 (08/08/25 0814)  BP: 123/69 (08/08/25 0814)  SpO2: 100 % (08/08/25 0814)    Physical Exam:  ASA:  2  Mallampati: n/a    General: no acute distress  Mental Status: alert and oriented to person, place and time  HEENT: normocephalic, atraumatic  Chest: unlabored breathing  Heart: regular heart rate  Abdomen: distended  Extremity: moves all extremities    ASSESSMENT/PLAN:     Sedation Plan: local  Patient will undergo ultrasound guided paracentesis.    SCOUT Wolfe, FNP  Interventional Radiology  [phone] clinic

## 2025-08-08 NOTE — PLAN OF CARE
Paracentesis done. Removed 3100 ml. No albumin given per sliding scale. Patient AAOx3, no distress noted, respirations even and unlabored, VSS. LLQ site clean, dry, and intact; no bleeding or hematoma noted. Personal belongings returned to patient. Pt denies need for AVS, patient accepting of education provided. Patient ambulated to [facility] to meet family for ride home. Patient stable for transport.

## 2025-08-15 ENCOUNTER — HOSPITAL ENCOUNTER (OUTPATIENT)
Dept: INTERVENTIONAL RADIOLOGY/VASCULAR | Facility: HOSPITAL | Age: 60
Discharge: HOME | End: 2025-08-15
Attending: INTERNAL MEDICINE
Payer: MEDICAID

## 2025-08-15 VITALS
OXYGEN SATURATION: 96 % | SYSTOLIC BLOOD PRESSURE: 117 MMHG | RESPIRATION RATE: 16 BRPM | HEART RATE: 55 BPM | DIASTOLIC BLOOD PRESSURE: 59 MMHG

## 2025-08-15 DIAGNOSIS — K74.60 CIRRHOSIS OF LIVER WITH ASCITES, UNSPECIFIED HEPATIC CIRRHOSIS TYPE: Chronic | ICD-10-CM

## 2025-08-15 DIAGNOSIS — R18.8 CIRRHOSIS OF LIVER WITH ASCITES, UNSPECIFIED HEPATIC CIRRHOSIS TYPE: Chronic | ICD-10-CM

## 2025-08-22 ENCOUNTER — HOSPITAL ENCOUNTER (OUTPATIENT)
Dept: INTERVENTIONAL RADIOLOGY/VASCULAR | Facility: HOSPITAL | Age: 60
Discharge: HOME | End: 2025-08-22
Attending: INTERNAL MEDICINE
Payer: MEDICAID

## 2025-08-22 VITALS
HEART RATE: 68 BPM | OXYGEN SATURATION: 100 % | SYSTOLIC BLOOD PRESSURE: 162 MMHG | DIASTOLIC BLOOD PRESSURE: 74 MMHG | RESPIRATION RATE: 18 BRPM

## 2025-08-22 DIAGNOSIS — K74.60 CIRRHOSIS OF LIVER WITH ASCITES, UNSPECIFIED HEPATIC CIRRHOSIS TYPE: Chronic | ICD-10-CM

## 2025-08-22 DIAGNOSIS — R18.8 CIRRHOSIS OF LIVER WITH ASCITES, UNSPECIFIED HEPATIC CIRRHOSIS TYPE: Chronic | ICD-10-CM

## 2025-08-22 PROCEDURE — 63600175 PHARM REV CODE 636 W HCPCS: Mod: JZ,TB | Performed by: INTERNAL MEDICINE

## 2025-08-22 PROCEDURE — P9045 ALBUMIN (HUMAN), 5%, 250 ML: HCPCS | Mod: JZ,TB | Performed by: INTERNAL MEDICINE

## 2025-08-22 RX ORDER — ALBUMIN HUMAN 250 G/1000ML
SOLUTION INTRAVENOUS
Status: DISPENSED
Start: 2025-08-22 | End: 2025-08-22

## 2025-08-22 RX ORDER — ALBUMIN HUMAN 50 G/1000ML
25 SOLUTION INTRAVENOUS ONCE
Status: COMPLETED | OUTPATIENT
Start: 2025-08-22 | End: 2025-08-22

## 2025-08-22 RX ADMIN — ALBUMIN (HUMAN) 25 G: 12.5 SOLUTION INTRAVENOUS at 09:08

## 2025-08-29 ENCOUNTER — HOSPITAL ENCOUNTER (OUTPATIENT)
Dept: INTERVENTIONAL RADIOLOGY/VASCULAR | Facility: HOSPITAL | Age: 60
Discharge: HOME | End: 2025-08-29
Attending: INTERNAL MEDICINE
Payer: MEDICAID

## (undated) DEVICE — CANISTER INFOV.A.C WOUND 500ML

## (undated) DEVICE — SUT MCRYL PLUS 4-0 PS2 27IN

## (undated) DEVICE — SUT 3-0 12-18IN SILK

## (undated) DEVICE — CATH DXTERITY AL20 100CM 6FR

## (undated) DEVICE — PAD DEFIB CADENCE ADULT R2

## (undated) DEVICE — INTRO SWARTZ SL2 8.5FR 63CM

## (undated) DEVICE — INTRO AGILIS MED CRL 8.5F 71CM

## (undated) DEVICE — PROTECTION STATION PLUS

## (undated) DEVICE — CATH ALL PUR URTHL RR 20FR

## (undated) DEVICE — SUT PROLENE 6-0 C-1 30IN BL

## (undated) DEVICE — GUIDEWIRE AMPLATZ XSTIFF 260CM

## (undated) DEVICE — Device

## (undated) DEVICE — PAD RADI FEMORAL

## (undated) DEVICE — SPONGE GAUZE 16PLY 4X4

## (undated) DEVICE — GOWN NONREINF SET-IN SLV XL

## (undated) DEVICE — INSERT CUSHIONPRONE VIEW LARGE

## (undated) DEVICE — SOL PVP-I SCRUB 7.5% 4OZ

## (undated) DEVICE — SET TRI-LUMEN FILTERED TUBE

## (undated) DEVICE — SET INTRO PERFRMR SHTH 16FR

## (undated) DEVICE — PAD UNDERPAD 30X30

## (undated) DEVICE — SPLINT PLASTER FAST SET 5X30IN

## (undated) DEVICE — GUIDEWIRE EMERALD 150CM PTFE

## (undated) DEVICE — SAFE TOUCH COLLECTION SYS

## (undated) DEVICE — HOSE DISCONNECTING 18 D&C

## (undated) DEVICE — APPLICATOR CHLORAPREP ORN 26ML

## (undated) DEVICE — SPIKE CONTRAST CONTROLLER

## (undated) DEVICE — STRIP PACKING ANTIMIC 1/4X1

## (undated) DEVICE — SHEATH HEMOSTASIS 8.5FR

## (undated) DEVICE — TOURNIQUET SB QC DP 18X4IN

## (undated) DEVICE — PAD CAST SPECIALIST STRL 4

## (undated) DEVICE — INTRO FAST-CATH SL1 8.5FR 63CM

## (undated) DEVICE — FILTER DISPOSABLE

## (undated) DEVICE — PACK EP DRAPE OMC

## (undated) DEVICE — CORD BIPOLAR 12 FOOT

## (undated) DEVICE — BLADE SURG STAINLESS STEEL #15

## (undated) DEVICE — REPROCESSED CATH ACUNAV 8FR

## (undated) DEVICE — OBTURATOR BLADELESS 8MM XI CLR

## (undated) DEVICE — NDL TRNSSPTL BRK-1 18GA 98CM

## (undated) DEVICE — DILATOR COONS TAPER 14FR

## (undated) DEVICE — CATH ARI 4FR

## (undated) DEVICE — SLING ARM LARGE FOAM STRAP

## (undated) DEVICE — ELECTRODE POLYHESIVEPRE-ATTACH

## (undated) DEVICE — SYS WATCHMAN FXD CURVE DBL US

## (undated) DEVICE — STOPCOCK 3-WAY

## (undated) DEVICE — SOL WATER STRL IRR 1000ML

## (undated) DEVICE — FORCEP STRAIGHT DISP

## (undated) DEVICE — SPONGE IV DRAIN 4X4 STERILE

## (undated) DEVICE — BUCKET PLASTER DISPOSABLE

## (undated) DEVICE — DRESSING N ADH OIL EMUL 3X3

## (undated) DEVICE — SEE MEDLINE ITEM 146298

## (undated) DEVICE — SPLINT PLASTER FS 5IN X 30IN

## (undated) DEVICE — GUIDEWIRE SUPRA CORE 035 190CM

## (undated) DEVICE — INTRODUCER HEMOSTASIS 7.5F

## (undated) DEVICE — SEAL UNIVERSAL 5MM-8MM XI

## (undated) DEVICE — SOL IRR SOD CHL .9% POUR

## (undated) DEVICE — CATH INFINITI JUDKINS JR4

## (undated) DEVICE — DRAPE STERI U-SHAPED 47X51IN

## (undated) DEVICE — CATH ADVISOR FI SENS ENBL 20MM

## (undated) DEVICE — SUT BLU BR 4-0 W/DMD PT 3/8

## (undated) DEVICE — SEE MEDLINE ITEM 157131

## (undated) DEVICE — TRAY DO THE ROBOT

## (undated) DEVICE — TAPE SURG DURAPORE 2 X10YD

## (undated) DEVICE — JELLY SURGILUBE 5GR

## (undated) DEVICE — TRAY MINOR GEN SURG

## (undated) DEVICE — ELECTRODE REM PLYHSV RETURN 9

## (undated) DEVICE — DRAPE PLASTIC U 60X72

## (undated) DEVICE — KIT PREVENA PLUS

## (undated) DEVICE — SPONGE COTTON TRAY 4X4IN

## (undated) DEVICE — BANDAGE MATRIX HK LOOP 4IN 5YD

## (undated) DEVICE — GOWN SURG 2XL DISP TIE BACK

## (undated) DEVICE — PAD GROUND UNIV STYLE CORD 9IN

## (undated) DEVICE — SHEET DRAPE FAN-FOLDED 3/4

## (undated) DEVICE — DRAPE C-ARM MINI DISP

## (undated) DEVICE — GAUZE DERMACEA LOW PLY 3X4YRDS

## (undated) DEVICE — TRANSDUCER ADULT DISP

## (undated) DEVICE — SEE MEDLINE ITEM 156911

## (undated) DEVICE — SEE MEDLINE ITEM 152515

## (undated) DEVICE — BANDAGE BULKEE LITE 3INX4.1YD

## (undated) DEVICE — GLOVE BIOGEL PI MICRO INDIC 7

## (undated) DEVICE — DRAPE SURG W/TWL 17 5/8X23

## (undated) DEVICE — SOL BETADINE 5%

## (undated) DEVICE — NDL SAFETY 22G X 1.5 ECLIPSE

## (undated) DEVICE — OBTURATOR BLDLESS 8MM LONG XI

## (undated) DEVICE — HEMOSTAT VASC BAND REG 24CM

## (undated) DEVICE — CATH ANGIO DIAG PIG 6FX110

## (undated) DEVICE — NDL 18GA X1 1/2 REG BEVEL

## (undated) DEVICE — COVER TIP CURVED SCISSORS XI

## (undated) DEVICE — CATH URETHRAL 16FR RED

## (undated) DEVICE — SYS SEE SHARP SCOPE ANTIFOG

## (undated) DEVICE — SEE MEDLINE ITEM 157150

## (undated) DEVICE — CATH ALII 4FR INFINITY

## (undated) DEVICE — SUT 2/0 36IN COATED VICRYL

## (undated) DEVICE — TOWEL OR DISP STRL BLUE 4/PK

## (undated) DEVICE — COVER DRAPE ACUSON STERILE

## (undated) DEVICE — PACK PERI/GYN OPTIMA

## (undated) DEVICE — NDL TRNSSPTL BRK-1 18GA 71CM

## (undated) DEVICE — KIT EVACUATOR 3-SPRING 1/8 DRN

## (undated) DEVICE — CATH SWAN GANZ STND 7FR

## (undated) DEVICE — COVERS PROBE NR-48 STERILE

## (undated) DEVICE — SUT MONOCRYL PLUS 4-0 P3

## (undated) DEVICE — COVER TABLE 44X90 STERILE

## (undated) DEVICE — CATH TACTICATH ABLAT BIDIR F-J

## (undated) DEVICE — SYR B-D DISP CONTROL 10CC100/C

## (undated) DEVICE — DRAIN CHANNEL ROUND 19FR

## (undated) DEVICE — COVER LIGHT HANDLE 80/CA

## (undated) DEVICE — SUT 9/0 5IN ETHILON BLK MON

## (undated) DEVICE — PORT ACCESS 8MM W/100MM LOW

## (undated) DEVICE — KIT PROBE COVER WITH GEL

## (undated) DEVICE — DIALATOR COONS TAPER 12F 20CM

## (undated) DEVICE — SET IRR URLGY 2LINE UNIV SPIKE

## (undated) DEVICE — CATH MAP BI-D HD SENSOR ENABLE

## (undated) DEVICE — KIT ENSITE ELECTRODE SURFACE

## (undated) DEVICE — GLOVE BIOGEL ECLIPSE SZ 7

## (undated) DEVICE — STOCKINET TUBULAR 1 PLY 6X60IN

## (undated) DEVICE — SUT 2/0 30IN PROLENE MONO

## (undated) DEVICE — SOL 9P NACL IRR PIC IL

## (undated) DEVICE — ADHESIVE MASTISOL VIAL 48/BX

## (undated) DEVICE — SUT MONOCRYL 3-0 SH U/D

## (undated) DEVICE — PACK UPPER EXTREMITY BAPTIST

## (undated) DEVICE — SOL NS 1000CC

## (undated) DEVICE — PACK EP DRAPE

## (undated) DEVICE — SOL NORMAL USPCA 0.9%

## (undated) DEVICE — BANDAGE ADHESIVE

## (undated) DEVICE — PACK UNIVERSAL SPLIT II

## (undated) DEVICE — SEE MEDLINE ITEM 146308

## (undated) DEVICE — TRAY CATH LAB OMC

## (undated) DEVICE — LOOP VESSEL BLUE MAXI

## (undated) DEVICE — DRESSING GAUZE XEROFORM 5X9

## (undated) DEVICE — SEE MEDLINE ITEM 152622

## (undated) DEVICE — PATCH ENSITE PRECISION NAVX SE

## (undated) DEVICE — GAUZE SPONGE PEANUT STRL

## (undated) DEVICE — TIP YANKAUERS BULB NO VENT

## (undated) DEVICE — GAUZE SPONGE 4X4 12PLY

## (undated) DEVICE — SUT PROLENE 5-0 36IN C-1

## (undated) DEVICE — BOWL FLUID - BACK STOP

## (undated) DEVICE — SPONGE LAP 18X18 PREWASHED

## (undated) DEVICE — LUBRICANT SURGILUBE 2 OZ

## (undated) DEVICE — BANDAGE ELASTIC 2X5 VELCRO ST

## (undated) DEVICE — IRRIGATOR ENDOSCOPY DISP.

## (undated) DEVICE — SET DECANTER MEDICHOICE

## (undated) DEVICE — DRAPE STERI-DRAPE 1000 17X11IN

## (undated) DEVICE — CATH BIDIRECTIONAL DF CRV 7FR

## (undated) DEVICE — SUT MONOCRYL 3-0 PS-2 UND

## (undated) DEVICE — DEVICE ANC SW STAT FOLEY 6-24

## (undated) DEVICE — SUT VICRYL 2-0 36 CT-1

## (undated) DEVICE — KIT CUSTOM MANIFOLD

## (undated) DEVICE — LINE PRESSURE MONITORING 96IN

## (undated) DEVICE — PAD ABD 8X10 STERILE

## (undated) DEVICE — CATH INFINITI 4F JL4 .042X100

## (undated) DEVICE — SOL ELECTROLUBE ANTI-STIC

## (undated) DEVICE — SUT VICRYL 3-0 27 SH

## (undated) DEVICE — SET TUR BLADDER IRRIG Y TYPE

## (undated) DEVICE — SET TUBING COOL POINT IRR

## (undated) DEVICE — SYS PANNUS RETENTION

## (undated) DEVICE — NDL PNEUMOPERITONEUM 150MM

## (undated) DEVICE — NDL PERCUTANEOUS ENTRYBSDN 18

## (undated) DEVICE — SPONGE DERMACEA GAUZE 4X4

## (undated) DEVICE — DRAPE COLUMN DAVINCI XI

## (undated) DEVICE — KIT WING PAD POSITIONING

## (undated) DEVICE — SEE MEDLINE ITEM 107746

## (undated) DEVICE — SUT PROLENE 4-0 MONO 18IN

## (undated) DEVICE — CATH AL1 4FR

## (undated) DEVICE — GLOVE BIOGEL SKINSENSE PI 6.5

## (undated) DEVICE — SPLINT FIBERGLASS PAD 4X15

## (undated) DEVICE — TRAP TISSUE COLLECTION BERKLE

## (undated) DEVICE — DILATOR COONS TAPER 10FR .038

## (undated) DEVICE — DRESSING ABD OPEN GRANUFOAM

## (undated) DEVICE — TRAY MINOR ORTHO

## (undated) DEVICE — SUT PDS II O CT-2 VIL MONO

## (undated) DEVICE — NDL HYPO REG 25G X 1 1/2

## (undated) DEVICE — SUT 2-0 VICRYL / CT-1

## (undated) DEVICE — SEE MEDLINE ITEM 154981

## (undated) DEVICE — SHEATH INTRODUCER 7FR 11CM

## (undated) DEVICE — CLIP MED TICALL

## (undated) DEVICE — DRAPE ARM DAVINCI XI

## (undated) DEVICE — SUT 4/0 18IN ETHILON BL P3

## (undated) DEVICE — SET DISPOSABLE COLLECTION

## (undated) DEVICE — SOL STRL WATER INJ 1000ML BG

## (undated) DEVICE — DRESSING INFOVAC LARGE BLK

## (undated) DEVICE — NDL INSUFFLATION VERRES 120MM

## (undated) DEVICE — OMNIPAQUE 350 200ML

## (undated) DEVICE — LINE INJECTION CLEARACIL 25.4

## (undated) DEVICE — DRAPE ANGIO BRACH 38X44IN

## (undated) DEVICE — SOL POVIDONE SCRUB IODINE 4 OZ

## (undated) DEVICE — SUT 4.0 ETHILON

## (undated) DEVICE — BLADE SCALP OPHTL BEVEL STR

## (undated) DEVICE — KIT GLIDESHEATH SLEND 6FR 10CM